# Patient Record
Sex: MALE | Race: WHITE | NOT HISPANIC OR LATINO | Employment: OTHER | URBAN - METROPOLITAN AREA
[De-identification: names, ages, dates, MRNs, and addresses within clinical notes are randomized per-mention and may not be internally consistent; named-entity substitution may affect disease eponyms.]

---

## 2019-06-20 ENCOUNTER — HOSPITAL ENCOUNTER (EMERGENCY)
Facility: HOSPITAL | Age: 64
Discharge: HOME | End: 2019-06-20
Attending: EMERGENCY MEDICINE
Payer: MEDICARE

## 2019-06-20 VITALS
SYSTOLIC BLOOD PRESSURE: 143 MMHG | BODY MASS INDEX: 33.86 KG/M2 | DIASTOLIC BLOOD PRESSURE: 92 MMHG | TEMPERATURE: 98 F | HEART RATE: 99 BPM | HEIGHT: 72 IN | OXYGEN SATURATION: 94 % | WEIGHT: 250 LBS | RESPIRATION RATE: 16 BRPM

## 2019-06-20 DIAGNOSIS — R07.9 CHEST PAIN: ICD-10-CM

## 2019-06-20 DIAGNOSIS — S29.011A MUSCLE STRAIN OF CHEST WALL, INITIAL ENCOUNTER: ICD-10-CM

## 2019-06-20 DIAGNOSIS — R07.89 CHEST WALL PAIN: Primary | ICD-10-CM

## 2019-06-20 PROCEDURE — 99284 EMERGENCY DEPT VISIT MOD MDM: CPT | Mod: ,,, | Performed by: EMERGENCY MEDICINE

## 2019-06-20 PROCEDURE — 99284 EMERGENCY DEPT VISIT MOD MDM: CPT | Mod: 25

## 2019-06-20 PROCEDURE — 93010 EKG 12-LEAD: ICD-10-PCS | Mod: ,,, | Performed by: INTERNAL MEDICINE

## 2019-06-20 PROCEDURE — 99284 PR EMERGENCY DEPT VISIT,LEVEL IV: ICD-10-PCS | Mod: ,,, | Performed by: EMERGENCY MEDICINE

## 2019-06-20 PROCEDURE — 93005 ELECTROCARDIOGRAM TRACING: CPT

## 2019-06-20 PROCEDURE — 93010 ELECTROCARDIOGRAM REPORT: CPT | Mod: ,,, | Performed by: INTERNAL MEDICINE

## 2019-06-20 NOTE — ED PROVIDER NOTES
"Encounter Date: 6/20/2019    SCRIBE #1 NOTE: I, Nichole Steen, am scribing for, and in the presence of,  Dr. Saravia. I have scribed the entire note.       History     Chief Complaint   Patient presents with    Chest Pain     cracked rib on right, now with sharp pain and shortness of breath      Time patient was seen by the provider: 2:08 PM      The patient is a 63 y.o. male with co-morbidities including: diabetes, HTN, hyperlipidemia and lobectomy who presents to the ED with a complaint of chest pain. Patient reports sudden onset of sharp right sided chest pain assoicated with SOB after he coughed this morning. He describes the pain as sharp, stabbing. He states he feels, "crackling" when he breathes. He reports history of similar presentation 1 month at which time he was found to have a dislocated rib. He states at that time, the pain subsided and he waited a week before seeking medical attention; however, with this occurrence, the pain has been unrelenting. He describes as stabbing. Patient notes he has chronic chest pain, but this pain today is much worse. He notes he normally has SOB when walking distances.    The history is provided by the patient and medical records.     Review of patient's allergies indicates:   Allergen Reactions    Beta-blockers (beta-adrenergic blocking agts) Shortness Of Breath     wheezing    Nsaids (non-steroidal anti-inflammatory drug) Other (See Comments)     hypertention    Statins-hmg-coa reductase inhibitors Other (See Comments)     Heart arrythemias    Pcn [penicillins] Rash     Past Medical History:   Diagnosis Date    Arthritis     Diabetes mellitus     Hyperlipidemia     Hypertension      Past Surgical History:   Procedure Laterality Date    mrxoma removed      PLEURA BIOPSY       Family History   Problem Relation Age of Onset    Hodgkin's lymphoma Mother     Kidney failure Father     Diabetes type I Father     Cancer Sister      Social History     Tobacco Use "    Smoking status: Heavy Tobacco Smoker     Packs/day: 1.00     Years: 35.00     Pack years: 35.00    Smokeless tobacco: Never Used   Substance Use Topics    Alcohol use: No    Drug use: No     Review of Systems   Constitutional: Negative for fever.   HENT: Negative for sore throat.    Respiratory: Positive for shortness of breath.    Cardiovascular: Positive for chest pain (right sided).   Gastrointestinal: Negative for nausea.   Genitourinary: Negative for dysuria.   Musculoskeletal: Negative for back pain.   Skin: Negative for rash.   Neurological: Negative for weakness.   Hematological: Does not bruise/bleed easily.       Physical Exam     Initial Vitals [06/20/19 1156]   BP Pulse Resp Temp SpO2   (!) 172/92 102 18 98.2 °F (36.8 °C) 95 %      MAP       --         Physical Exam    Nursing note and vitals reviewed.  Constitutional: He appears well-developed and well-nourished. No distress.   Cardiovascular: Normal rate, regular rhythm and normal heart sounds. Exam reveals no gallop and no friction rub.    No murmur heard.  Pulmonary/Chest: Breath sounds normal. No respiratory distress. He has no rhonchi. He has no rales. He exhibits tenderness (over the right anterior chest wall at the level of the lower ribs; palpation of this area reproduces the pain).   Expiratory wheezes at left base that cleared after deep breathing.   Abdominal: Soft. He exhibits no distension. There is no tenderness.         ED Course   Procedures  Labs Reviewed - No data to display     ECG Results          EKG 12-lead (Final result)  Result time 06/20/19 13:38:05    Final result by Interface, Lab In [facility] (06/20/19 13:38:05)                 Narrative:    Test Reason : R07.9,    Vent. Rate : 101 BPM     Atrial Rate : 101 BPM     P-R Int : 130 ms          QRS Dur : 106 ms      QT Int : 354 ms       P-R-T Axes : 043 -68 078 degrees     QTc Int : 459 ms    Sinus tachycardia  Left anterior fascicular block  Abnormal ECG  When compared  with ECG of 22-DEC-2009 12:09,  Nonspecific T wave abnormality no longer evident in Lateral leads  Confirmed by SHAONN HAMMER MD ([id number]) on 6/20/2019 1:37:59 PM    Referred By: [id number]   SELF           Confirmed By:SHANON HAMMER MD                            Imaging Results          X-Ray Chest PA And Lateral (Final result)  Result time 06/20/19 13:18:04    Final result by Gisel Bradley MD (06/20/19 13:18:04)                 Impression:      Postoperative changes noted in the right hemithorax.  Allowing for these and for bandlike opacities at the lateral aspect of the left lower lung zone, I detect no convincing evidence of disease on inspiratory and expiratory PA chest radiographs or on accompanying lateral view.      Electronically signed by: Gisel Bradley MD  Date:    06/20/2019  Time:    13:18             Narrative:    EXAMINATION:  XR CHEST PA AND LATERAL    CLINICAL HISTORY:  chest pain;    TECHNIQUE:  PA and lateral views of the chest were performed.    COMPARISON:  Right ribs: 05/30/2019.    Chest radiograph: 04/21/2010.  01/21/2010.  12/22/2009.    FINDINGS:  There is slight rightward mediastinal shift and partial volume loss in the right hemithorax reflecting prior partial resection of the right lung.  By history this resection of the right lung was performed for lobectomy necessitated by adhesions that developed after resection of right atrial myxoma.    Modest bandlike opacities at the left lateral costophrenic angle, seen only on PA view, are consistent with subsegmental atelectasis.    PA chest radiographs were obtained with inspiration and expiration.  There is no mediastinal shift and no evidence of air trapping.    I detect no new pulmonary disease and no pleural fluid, lymph node enlargement, cardiac decompensation, pneumothorax, pneumomediastinum or significant osseous abnormality.                              X-Rays:   Independently Interpreted Readings:   Other Readings:  Linear  opacification at left lung base. Chronic appearing changes at right lung base. No evidence of pneumothorax.     Medical Decision Making:   History:   Old Medical Records: I decided to obtain old medical records.  Old Records Summarized: records from clinic visits and other records.  Initial Assessment:   63 y.o. male with right sided chest wall pain after coughing. Differential diagnoses includes chest wall strain, pneumothorax, considered, but doubt pneumonia, PE, rib fracture. I have independently interpreted chest xray which shows no evidence of pneumothorax or rib fracture. I do not feel his findings on xray are acute. I have recommended lidocaine patches to the area of pain. Patient has these already at home. I recommend close follow up with his PCP for recheck sooner for worsening. Of note, patient does report that he has baseline SOB and his pulse ox does drop with exertion at his baseline. His breathing is no different today.   Independently Interpreted Test(s):   I have ordered and independently interpreted X-rays - see prior notes.  Clinical Tests:   Radiological Study: Ordered and Reviewed  Medical Tests: Ordered and Reviewed            Scribe Attestation:   Scribe #1: I performed the above scribed service and the documentation accurately describes the services I performed. I attest to the accuracy of the note.    Attending Attestation:           Physician Attestation for Scribe:      Comments: I, Dr. Tabitha Cody, personally performed the services described in this documentation. All medical record entries made by the scribe were at my direction and in my presence.  I have reviewed the chart and agree that the record reflects my personal performance and is accurate and complete. Tabitha Cody MD.                 Clinical Impression:       ICD-10-CM ICD-9-CM   1. Chest wall pain R07.89 786.52   2. Chest pain R07.9 786.50   3. Muscle strain of chest wall, initial encounter S29.011A 848.8          Disposition:   Disposition: Discharged  Condition: Stable                        Tabitha Cody MD  06/23/19 1129

## 2019-06-20 NOTE — PROVIDER PROGRESS NOTES - EMERGENCY DEPT.
Encounter Date: 6/20/2019    ED Physician Progress Notes         EKG - STEMI Decision  Initial Reading: No STEMI present.

## 2019-06-20 NOTE — ED NOTES
LOC: The patient is awake, alert and aware of environment with an appropriate affect, the patient is oriented x 3 and speaking appropriately.  APPEARANCE: Patient resting comfortably and in no acute distress, patient is clean and well groomed, patient's clothing is properly fastened.  SKIN: The skin is warm and dry, color consistent with ethnicity, patient has normal skin turgor and moist mucus membranes, skin intact, no breakdown or bruising noted.  MUSCULOSKELETAL: Patient moving all extremities spontaneously, no obvious swelling or deformities noted.  RESPIRATORY: Airway is open and patent, respirations are spontaneous, patient has a normal effort and rate, no accessory muscle use noted  ABDOMEN: Soft and non tender to palpation, no distention noted  NEUROLOGIC:  facial expression is symmetrical, patient moving all extremities spontaneously, normal sensation in all extremities when touched with a finger.  Follows all commands appropriately.    Patient states he coughed this am and felt a sudden sharp pain under his right chest in his rib area, patient states now he feels a cracking feeling when he takes a deep breath in that same area, no acute distress noted, denies any other complains at this time, will continue to monitor

## 2019-06-20 NOTE — DISCHARGE INSTRUCTIONS
Our goal in the emergency department is to always give you outstanding care and exceptional service. You may receive a survey by mail or e-mail in the next week regarding your experience in our ED. We would greatly appreciate your completing and returning the survey. Your feedback provides us with a way to recognize our staff who give very good care and it helps us learn how to improve when your experience was below our aspiration of excellence.     Use your lidocaine patches in the area of pain as needed.  Take over the counter advil as needed with food.    Return to the ED for worsening in any way or see your doctor sooner for worsening shortness of breath or worsening chest pain.

## 2019-06-20 NOTE — ED NOTES
Pt expressed concerns with becoming hypoglycemic, MD notified. Pt provided crackers and juice, verbal order per Dr. Saravia

## 2020-01-31 ENCOUNTER — TELEPHONE (OUTPATIENT)
Dept: RHEUMATOLOGY | Facility: CLINIC | Age: 65
End: 2020-01-31

## 2020-01-31 NOTE — TELEPHONE ENCOUNTER
Spoke to pt and scheduled appt. Pt was closer to [facility] and wanted to schedule there.

## 2020-01-31 NOTE — TELEPHONE ENCOUNTER
----- Message from Timoteo Tavarez sent at 1/31/2020 10:55 AM CST -----  Contact: pt  Type: Needs Medical Advice    Who Called:  pt    Best Call Back Number: [phone]  Additional Information: States a referral was faxed over for him by his PCP. Would like to make an appointment. Please call to advise.

## 2020-04-07 ENCOUNTER — PATIENT MESSAGE (OUTPATIENT)
Dept: RHEUMATOLOGY | Facility: CLINIC | Age: 65
End: 2020-04-07

## 2020-04-07 ENCOUNTER — OFFICE VISIT (OUTPATIENT)
Dept: RHEUMATOLOGY | Facility: CLINIC | Age: 65
End: 2020-04-07
Payer: MEDICARE

## 2020-04-07 ENCOUNTER — LAB VISIT (OUTPATIENT)
Dept: LAB | Facility: OTHER | Age: 65
End: 2020-04-07
Attending: INTERNAL MEDICINE
Payer: MEDICARE

## 2020-04-07 DIAGNOSIS — M25.50 POLYARTHRALGIA: ICD-10-CM

## 2020-04-07 DIAGNOSIS — Z79.52 LONG TERM (CURRENT) USE OF SYSTEMIC STEROIDS: ICD-10-CM

## 2020-04-07 DIAGNOSIS — M25.50 POLYARTHRALGIA: Primary | ICD-10-CM

## 2020-04-07 DIAGNOSIS — Z11.4 ENCOUNTER FOR SCREENING FOR HUMAN IMMUNODEFICIENCY VIRUS (HIV): ICD-10-CM

## 2020-04-07 DIAGNOSIS — R79.9 ABNORMAL FINDING OF BLOOD CHEMISTRY, UNSPECIFIED: ICD-10-CM

## 2020-04-07 DIAGNOSIS — Z79.631 ENCOUNTER FOR METHOTREXATE MONITORING: ICD-10-CM

## 2020-04-07 DIAGNOSIS — M06.9 RHEUMATOID ARTHRITIS FLARE: Primary | ICD-10-CM

## 2020-04-07 DIAGNOSIS — Z51.81 ENCOUNTER FOR METHOTREXATE MONITORING: ICD-10-CM

## 2020-04-07 LAB
ALBUMIN SERPL BCP-MCNC: 3.4 G/DL (ref 3.5–5.2)
ALP SERPL-CCNC: 93 U/L (ref 55–135)
ALT SERPL W/O P-5'-P-CCNC: 48 U/L (ref 10–44)
ANION GAP SERPL CALC-SCNC: 12 MMOL/L (ref 8–16)
AST SERPL-CCNC: 23 U/L (ref 10–40)
BASOPHILS # BLD AUTO: 0.07 K/UL (ref 0–0.2)
BASOPHILS NFR BLD: 0.7 % (ref 0–1.9)
BILIRUB SERPL-MCNC: 0.3 MG/DL (ref 0.1–1)
BUN SERPL-MCNC: 19 MG/DL (ref 8–23)
CALCIUM SERPL-MCNC: 8.9 MG/DL (ref 8.7–10.5)
CCP AB SER IA-ACNC: 336.9 U/ML
CHLORIDE SERPL-SCNC: 104 MMOL/L (ref 95–110)
CO2 SERPL-SCNC: 25 MMOL/L (ref 23–29)
CREAT SERPL-MCNC: 1 MG/DL (ref 0.5–1.4)
CRP SERPL-MCNC: 1.5 MG/L (ref 0–8.2)
DIFFERENTIAL METHOD: ABNORMAL
EOSINOPHIL # BLD AUTO: 0.2 K/UL (ref 0–0.5)
EOSINOPHIL NFR BLD: 2.2 % (ref 0–8)
ERYTHROCYTE [DISTWIDTH] IN BLOOD BY AUTOMATED COUNT: 13.6 % (ref 11.5–14.5)
ERYTHROCYTE [SEDIMENTATION RATE] IN BLOOD: 5 MM/HR (ref 0–10)
EST. GFR  (AFRICAN AMERICAN): >60 ML/MIN/1.73 M^2
EST. GFR  (NON AFRICAN AMERICAN): >60 ML/MIN/1.73 M^2
ESTIMATED AVG GLUCOSE: 209 MG/DL (ref 68–131)
FERRITIN SERPL-MCNC: 163 NG/ML (ref 20–300)
GLUCOSE SERPL-MCNC: 165 MG/DL (ref 70–110)
HBA1C MFR BLD HPLC: 8.9 % (ref 4–5.6)
HCT VFR BLD AUTO: 53.4 % (ref 40–54)
HGB BLD-MCNC: 17.4 G/DL (ref 14–18)
IMM GRANULOCYTES # BLD AUTO: 0.04 K/UL (ref 0–0.04)
IMM GRANULOCYTES NFR BLD AUTO: 0.4 % (ref 0–0.5)
IRON SERPL-MCNC: 110 UG/DL (ref 45–160)
LYMPHOCYTES # BLD AUTO: 2.3 K/UL (ref 1–4.8)
LYMPHOCYTES NFR BLD: 24.5 % (ref 18–48)
MCH RBC QN AUTO: 30.3 PG (ref 27–31)
MCHC RBC AUTO-ENTMCNC: 32.6 G/DL (ref 32–36)
MCV RBC AUTO: 93 FL (ref 82–98)
MONOCYTES # BLD AUTO: 0.7 K/UL (ref 0.3–1)
MONOCYTES NFR BLD: 6.8 % (ref 4–15)
NEUTROPHILS # BLD AUTO: 6.2 K/UL (ref 1.8–7.7)
NEUTROPHILS NFR BLD: 65.4 % (ref 38–73)
NRBC BLD-RTO: 0 /100 WBC
PLATELET # BLD AUTO: 141 K/UL (ref 150–350)
PMV BLD AUTO: 12.2 FL (ref 9.2–12.9)
POTASSIUM SERPL-SCNC: 4.3 MMOL/L (ref 3.5–5.1)
PROT SERPL-MCNC: 6.9 G/DL (ref 6–8.4)
RBC # BLD AUTO: 5.75 M/UL (ref 4.6–6.2)
RHEUMATOID FACT SERPL-ACNC: 456 IU/ML (ref 0–15)
SATURATED IRON: 27 % (ref 20–50)
SODIUM SERPL-SCNC: 141 MMOL/L (ref 136–145)
TOTAL IRON BINDING CAPACITY: 401 UG/DL (ref 250–450)
TRANSFERRIN SERPL-MCNC: 271 MG/DL (ref 200–375)
URATE SERPL-MCNC: 5 MG/DL (ref 3.4–7)
WBC # BLD AUTO: 9.52 K/UL (ref 3.9–12.7)

## 2020-04-07 PROCEDURE — 99205 OFFICE O/P NEW HI 60 MIN: CPT | Mod: 95,,, | Performed by: INTERNAL MEDICINE

## 2020-04-07 PROCEDURE — 86706 HEP B SURFACE ANTIBODY: CPT

## 2020-04-07 PROCEDURE — 86200 CCP ANTIBODY: CPT

## 2020-04-07 PROCEDURE — 84165 PATHOLOGIST INTERPRETATION SPE: ICD-10-PCS | Mod: 26,,, | Performed by: PATHOLOGY

## 2020-04-07 PROCEDURE — 99205 PR OFFICE/OUTPT VISIT, NEW, LEVL V, 60-74 MIN: ICD-10-PCS | Mod: 95,,, | Performed by: INTERNAL MEDICINE

## 2020-04-07 PROCEDURE — 84165 PROTEIN E-PHORESIS SERUM: CPT

## 2020-04-07 PROCEDURE — 86592 SYPHILIS TEST NON-TREP QUAL: CPT

## 2020-04-07 PROCEDURE — 85651 RBC SED RATE NONAUTOMATED: CPT

## 2020-04-07 PROCEDURE — 86431 RHEUMATOID FACTOR QUANT: CPT

## 2020-04-07 PROCEDURE — 82728 ASSAY OF FERRITIN: CPT

## 2020-04-07 PROCEDURE — 83540 ASSAY OF IRON: CPT

## 2020-04-07 PROCEDURE — 87340 HEPATITIS B SURFACE AG IA: CPT

## 2020-04-07 PROCEDURE — 84550 ASSAY OF BLOOD/URIC ACID: CPT

## 2020-04-07 PROCEDURE — 86704 HEP B CORE ANTIBODY TOTAL: CPT

## 2020-04-07 PROCEDURE — 86038 ANTINUCLEAR ANTIBODIES: CPT

## 2020-04-07 PROCEDURE — 84165 PROTEIN E-PHORESIS SERUM: CPT | Mod: 26,,, | Performed by: PATHOLOGY

## 2020-04-07 PROCEDURE — 80053 COMPREHEN METABOLIC PANEL: CPT

## 2020-04-07 PROCEDURE — 86803 HEPATITIS C AB TEST: CPT

## 2020-04-07 PROCEDURE — 86140 C-REACTIVE PROTEIN: CPT

## 2020-04-07 PROCEDURE — 86787 VARICELLA-ZOSTER ANTIBODY: CPT

## 2020-04-07 PROCEDURE — 85025 COMPLETE CBC W/AUTO DIFF WBC: CPT

## 2020-04-07 PROCEDURE — 83036 HEMOGLOBIN GLYCOSYLATED A1C: CPT

## 2020-04-07 RX ORDER — PREDNISONE 10 MG/1
10 TABLET ORAL 2 TIMES DAILY
COMMUNITY
End: 2020-10-02

## 2020-04-07 RX ORDER — METHOTREXATE 2.5 MG/1
12.5 TABLET ORAL
COMMUNITY
End: 2020-10-02

## 2020-04-07 ASSESSMENT — ROUTINE ASSESSMENT OF PATIENT INDEX DATA (RAPID3)
PATIENT GLOBAL ASSESSMENT SCORE: 5
PAIN SCORE: 8
FATIGUE SCORE: 9
PSYCHOLOGICAL DISTRESS SCORE: 4.4
TOTAL RAPID3 SCORE: 6.33
MDHAQ FUNCTION SCORE: 1.8
AM STIFFNESS SCORE: 0, NO

## 2020-04-07 NOTE — PROGRESS NOTES
Subjective:       Patient ID: Nithin Wagner is a 64 y.o. male.    Chief Complaint: No chief complaint on file.    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   He was 53 when he was diagnosed with RA. He reports he reports he was having pain and swelling in knees and ankles. He then he had involvement in hands and shoulders. He was put on MTX.  He reports that the methotrexate did help with joints but it caused confusion so he stopped it for 2 years. He was taken aleve which helped his pain for 2 years.Then he developed HTN on NSAIDS so he stopped them.  He was then put on hydrocodone 5 QID to control his pain. Reports that warm weather improves his pain. He is back on methotrexate. Today, it will be his 5th dose. He is taking folic acid 1 mg po qday.  He has never tried up to 8 pills once a week. He is taking prednisone 10mg po BID for 4 weeks. He still has some pain in ankles and knees.  He does get mild swelling in knees and ankles. He was previously on prednisone 40mg a day. He smokes 1 pack per day for 40 years    family hx:cousin: connective tissue disease      Past Medical History:   Diagnosis Date    Arthritis     Diabetes mellitus     Hyperlipidemia     Hypertension        Review of Systems      Objective:   There were no vitals taken for this visit.     Physical Exam   Constitutional: He is oriented to person, place, and time and well-developed, well-nourished, and in no distress. No distress.   HENT:   Head: Normocephalic and atraumatic.   Right Ear: External ear normal.   Eyes: Conjunctivae and EOM are normal. Pupils are equal, round, and reactive to light. Right eye exhibits no discharge. Left eye exhibits no discharge. No scleral icterus.   Neck: Normal range of motion. Neck supple. No JVD present. No tracheal deviation present. No thyromegaly  present.   Cardiovascular: Normal rate and regular rhythm.    Pulmonary/Chest: Effort normal and breath sounds normal. No stridor. No respiratory distress. He has no wheezes. He has no rales. He exhibits no tenderness.   Abdominal: Soft. Bowel sounds are normal. He exhibits no distension and no mass. There is no tenderness. There is no rebound and no guarding.   Lymphadenopathy:     He has no cervical adenopathy.   Neurological: He is alert and oriented to person, place, and time. He displays normal reflexes. No cranial nerve deficit. He exhibits normal muscle tone. Coordination normal.   Skin: Skin is warm and dry. No rash noted. He is not diaphoretic. No erythema. No pallor.     Musculoskeletal: He exhibits edema and tenderness. He exhibits no deformity.          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 He is on MTX and prednisone 20mg a day and is still having severe flares with polyarticular involvement.  I had long discussion with patient and told him he has severe disease and we will likely need to add biologic.    No diagnosis found.    Plan:       Problem List Items Addressed This Visit     None        Labs  xrays  Continue prednisone 10mg po BID (discussed side effects of long term steroids including weight gain, diabetes, cataracts, immunosuppression)  Discussed side effects of MTX ( Patient aware that risks include and not limited to lung toxicity, liver abnormalities, cell count abnormalities, malignancy, and allergic  reaction to medication. )*    Labs  xrays    Educated patient on Covid virus and prevention strategies.    The patient location is: home  The chief complaint leading to consultation is: joint pain  Visit type: Virtual visit with synchronous audio and video  Total time  spent with patient: 30 minutes  Each patient to whom he or she provides medical services by telemedicine is:  (1) informed of the relationship between the physician and patient and the respective role of any other health care provider with respect to management of the patient; and (2) notified that he or she may decline to receive medical services by telemedicine and may withdraw from such care at any time.

## 2020-04-07 NOTE — PROGRESS NOTES
Rapid3 Question Responses and Scores 4/7/2020   MDHAQ Score 1.8   Psychologic Score 4.4   Pain Score 8   When you awakened in the morning OVER THE LAST WEEK, did you feel stiff? No   If Yes, please indicate the number of hours until you are as limber as you will be for the day 0   Fatigue Score 9   Global Health Score 5   RAPID3 Score 6.33       Answers for HPI/ROS submitted by the patient on 4/7/2020   fever: Yes  eye redness: No  headaches: Yes  shortness of breath: No  chest pain: No  trouble swallowing: No  diarrhea: No  constipation: No  unexpected weight change: No  genital sore: No  During the last 3 days, have you had a skin rash?: No  Bruises or bleeds easily: No  cough: Yes

## 2020-04-07 NOTE — PROGRESS NOTES
Pre charting done. Lara Montelongo MA  Answers for HPI/ROS submitted by the patient on 4/7/2020   fever: Yes  eye redness: No  headaches: Yes  shortness of breath: No  chest pain: No  trouble swallowing: No  diarrhea: No  constipation: No  unexpected weight change: No  genital sore: No  During the last 3 days, have you had a skin rash?: No  Bruises or bleeds easily: No  cough: Yes

## 2020-04-07 NOTE — LETTER
April 13, 2020      Tushar Drew MD  [address] [facility] [place] [address]           [facility] - Rheumatology  [address]  [place] [address]  Phone: [phone]  Fax: [phone]          Patient: Nithin Wagner   MR Number: [id number]   YOB: 1955   Date of Visit: 4/7/2020       Dear Dr. Tushar Drew:    Thank you for referring Nithin Wagner to me for evaluation. Attached you will find relevant portions of my assessment and plan of care.    If you have questions, please do not hesitate to call me. I look forward to following Nithin Wagner along with you.    Sincerely,    Korin Alcala MD    Enclosure  CC:  No Recipients    If you would like to receive this communication electronically, please contact [email or web address] or [phone] to request more information on [facility] access.    For providers and/or their staff who would like to refer a patient to [facility], please contact us through our one-stop-shop provider referral line, [facility], at [phone].    If you feel you have received this communication in error or would no longer like to receive these types of communications, please e-mail [email or web address]

## 2020-04-08 ENCOUNTER — PATIENT MESSAGE (OUTPATIENT)
Dept: RHEUMATOLOGY | Facility: CLINIC | Age: 65
End: 2020-04-08

## 2020-04-08 LAB
ALBUMIN SERPL ELPH-MCNC: 3.75 G/DL (ref 3.35–5.55)
ALPHA1 GLOB SERPL ELPH-MCNC: 0.29 G/DL (ref 0.17–0.41)
ALPHA2 GLOB SERPL ELPH-MCNC: 0.79 G/DL (ref 0.43–0.99)
B-GLOBULIN SERPL ELPH-MCNC: 0.75 G/DL (ref 0.5–1.1)
GAMMA GLOB SERPL ELPH-MCNC: 0.91 G/DL (ref 0.67–1.58)
HBV CORE AB SERPL QL IA: POSITIVE
HBV SURFACE AB SER-ACNC: POSITIVE M[IU]/ML
HBV SURFACE AG SERPL QL IA: NEGATIVE
HCV AB SERPL QL IA: NEGATIVE
PATHOLOGIST INTERPRETATION SPE: NORMAL
PROT SERPL-MCNC: 6.5 G/DL (ref 6–8.4)
STRONGYLOIDES ANTIBODY IGG: NEGATIVE

## 2020-04-09 ENCOUNTER — PATIENT MESSAGE (OUTPATIENT)
Dept: RHEUMATOLOGY | Facility: CLINIC | Age: 65
End: 2020-04-09

## 2020-04-09 LAB
ANA SER QL IF: NORMAL
RPR SER QL: NORMAL

## 2020-04-11 LAB
VARICELLA INTERPRETATION: POSITIVE
VARICELLA ZOSTER IGG: 3.41 ISR (ref 0–0.9)

## 2020-04-13 ENCOUNTER — OFFICE VISIT (OUTPATIENT)
Dept: RHEUMATOLOGY | Facility: CLINIC | Age: 65
End: 2020-04-13
Payer: MEDICARE

## 2020-04-13 DIAGNOSIS — M06.9 RHEUMATOID ARTHRITIS FLARE: ICD-10-CM

## 2020-04-13 DIAGNOSIS — Z79.631 ENCOUNTER FOR METHOTREXATE MONITORING: ICD-10-CM

## 2020-04-13 DIAGNOSIS — R76.8 HEPATITIS B CORE ANTIBODY POSITIVE: Primary | ICD-10-CM

## 2020-04-13 DIAGNOSIS — Z51.81 ENCOUNTER FOR METHOTREXATE MONITORING: ICD-10-CM

## 2020-04-13 PROCEDURE — 99214 PR OFFICE/OUTPT VISIT, EST, LEVL IV, 30-39 MIN: ICD-10-PCS | Mod: 95,,, | Performed by: INTERNAL MEDICINE

## 2020-04-13 PROCEDURE — 99214 OFFICE O/P EST MOD 30 MIN: CPT | Mod: 95,,, | Performed by: INTERNAL MEDICINE

## 2020-04-13 NOTE — PROGRESS NOTES
Pre-charting done. Lara Montelongo MA  Answers for HPI/ROS submitted by the patient on 4/7/2020   fever: No  eye redness: No  headaches: Yes  shortness of breath: No  chest pain: No  trouble swallowing: No  diarrhea: No  constipation: No  unexpected weight change: No  genital sore: No  During the last 3 days, have you had a skin rash?: No  Bruises or bleeds easily: No  cough: Yes

## 2020-04-13 NOTE — PROGRESS NOTES
Subjective:       Patient ID: Nithin Wagner is a 64 y.o. male.    Chief Complaint: No chief complaint on file.    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   He was 53 when he was diagnosed with RA. He reports he reports he was having pain and swelling in knees and ankles. He then he had involvement in hands and shoulders. He was put on MTX.  He reports that the methotrexate did help with joints but it caused confusion so he stopped it for 2 years. He was taken aleve which helped his pain for 2 years.Then he developed HTN on NSAIDS so he stopped them.  He was then put on hydrocodone 5 QID to control his pain. Reports that warm weather improves his pain. He is back on methotrexate. Today, it will be his 5th dose. He is taking folic acid 1 mg po qday.  He has never tried up to 8 pills once a week. He is taking prednisone 10mg po BID for 4 weeks. He still has some pain in ankles and knees.  He does get mild swelling in knees and ankles. He was previously on prednisone 40mg a day. He smokes 1 pack per day for 40 years    family hx:cousin: connective tissue disease    Interval history: Last dose of MTX was last Tuesday.      Past Medical History:   Diagnosis Date    Arthritis     Diabetes mellitus     Hyperlipidemia     Hypertension        Review of Systems      Review of Systems   Constitutional: Negative for fever.   Eyes: Negative for redness.   Respiratory: Negative for cough, hemoptysis, sputum production and shortness of breath.    Cardiovascular: Negative for chest pain.   Gastrointestinal: Negative for constipation and diarrhea.   Musculoskeletal: Positive for joint pain.   Skin: Negative for rash.   Neurological: Negative for headaches.   Endo/Heme/Allergies: Does not bruise/bleed easily.         Objective:   There were no vitals taken for this visit.      Physical Exam   Constitutional: He is oriented to person, place, and time and well-developed, well-nourished, and in no distress. No distress.   HENT:   Head: Normocephalic and atraumatic.   Right Ear: External ear normal.   Eyes: Conjunctivae and EOM are normal. Pupils are equal, round, and reactive to light. Right eye exhibits no discharge. Left eye exhibits no discharge. No scleral icterus.   Neck: Normal range of motion. Neck supple. No JVD present. No tracheal deviation present. No thyromegaly present.   Cardiovascular: Normal rate and regular rhythm.    Pulmonary/Chest: Effort normal and breath sounds normal. No stridor. No respiratory distress. He has no wheezes. He has no rales. He exhibits no tenderness.   Abdominal: Soft. Bowel sounds are normal. He exhibits no distension and no mass. There is no tenderness. There is no rebound and no guarding.   Lymphadenopathy:     He has no cervical adenopathy.   Neurological: He is alert and oriented to person, place, and time. He displays normal reflexes. No cranial nerve deficit. He exhibits normal muscle tone. Coordination normal.   Skin: Skin is warm and dry. No rash noted. He is not diaphoretic. No erythema. No pallor.     Musculoskeletal: He exhibits edema and tenderness. He exhibits no deformity.      labs: reviewed  Results for CECILIA MEAD (MRN [id number]) as of 4/14/2020 12:44   Ref. 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 He is on MTX and prednisone 20mg a day and is still having severe flares with polyarticular involvement.  I had long discussion with patient and told him he has severe disease and  we will likely need to add biologic.    Given that he has +hep B core antibody and reports history of hepatitis, will set him up with hepatology before we add biologic. I had long discussion with patient and told him ideally, I would like to get him off methotrexate give his baseline thrombocytopenia, mild LFT elevation, and lung surgery, but given that I am limited, I will continue his mtx for now until we are cleared by hepatology.    No diagnosis found.    Plan:       Problem List Items Addressed This Visit     None      Continue MTX 6 pills once a week  Continue folic acid 1 mg po qday  Hepatology consult  Continue prednisone 10mg po BID (discussed side effects of long term steroids including weight gain, diabetes, cataracts, immunosuppression)  Educated patient on Covid virus and prevention strategies.  labs  The patient location is: home  The chief complaint leading to consultation is: joint pain  Visit type: Virtual visit with synchronous audio and video  Total time spent with patient: 30 minutes  Each patient to whom he or she provides medical services by telemedicine is:  (1) informed of the relationship between the physician and patient and the respective role of any other health care provider with respect to management of the patient; and (2) notified that he or she may decline to receive medical services by telemedicine and may withdraw from such care at any time.

## 2020-04-16 ENCOUNTER — DOCUMENTATION ONLY (OUTPATIENT)
Dept: TRANSPLANT | Facility: CLINIC | Age: 65
End: 2020-04-16

## 2020-04-16 NOTE — LETTER
April 16, 2020    Nithin PR[address]  [facility] [address]      Dear Nithin Wagner:    Your doctor has referred you to the [facility]. We are sending this letter to remind you to make an appointment with us to complete the referral process.     Please call us at [phone] to schedule an appointment. We look forward to seeing you soon.     If you received a call and have been scheduled, please disregard this letter.       Sincerely,        [facility]   [address] [place] [address]  [phone]

## 2020-04-16 NOTE — NURSING
Pt records reviewed.   Pt will be referred to Hepatology.  Hepatitis B core antibody positive  Initial referral received  from the [facility].   Referring Provider/diagnosis  Korin Alcala MD      Referral letter sent to patient.

## 2020-04-24 ENCOUNTER — OFFICE VISIT (OUTPATIENT)
Dept: HEPATOLOGY | Facility: CLINIC | Age: 65
End: 2020-04-24
Payer: MEDICARE

## 2020-04-24 ENCOUNTER — PATIENT MESSAGE (OUTPATIENT)
Dept: HEPATOLOGY | Facility: CLINIC | Age: 65
End: 2020-04-24

## 2020-04-24 ENCOUNTER — TELEPHONE (OUTPATIENT)
Dept: HEPATOLOGY | Facility: CLINIC | Age: 65
End: 2020-04-24

## 2020-04-24 DIAGNOSIS — K76.0 NAFLD (NONALCOHOLIC FATTY LIVER DISEASE): Primary | ICD-10-CM

## 2020-04-24 DIAGNOSIS — R76.8 HEPATITIS B CORE ANTIBODY POSITIVE: ICD-10-CM

## 2020-04-24 PROCEDURE — 99213 PR OFFICE/OUTPT VISIT, EST, LEVL III, 20-29 MIN: ICD-10-PCS | Mod: 95,,, | Performed by: INTERNAL MEDICINE

## 2020-04-24 PROCEDURE — 99213 OFFICE O/P EST LOW 20 MIN: CPT | Mod: 95,,, | Performed by: INTERNAL MEDICINE

## 2020-04-24 RX ORDER — LAMIVUDINE 150 MG/1
150 TABLET, FILM COATED ORAL DAILY
Qty: 30 TABLET | Refills: 11 | Status: SHIPPED | OUTPATIENT
Start: 2020-04-24 | End: 2020-05-24

## 2020-04-24 NOTE — PROGRESS NOTES
Subjective:       Patient ID: Nithin Wagner is a 64 y.o. male.    Chief Complaint: No chief complaint on file.  The patient location is: Home  The chief complaint leading to consultation is: HBVcAb+ve  Visit type: audiovisual  Total time spent with patient: 20 min  Each patient to whom he or she provides medical services by telemedicine is:  (1) informed of the relationship between the physician and patient and the respective role of any other health care provider with respect to management of the patient; and (2) notified that he or she may decline to receive medical services by telemedicine and may withdraw from such care at any time.      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  He is currently on high dose prednisone but was on methotrexate for about 1.5 years until 2017.    He describes jaundice twice- first time in 1974 after a needle stick accident at work and the second time when he was in [place].    I note that he is currently HCV ab negative but is HBVcAb+ve.    He is overweight with a BMI of 34 and has Diabetes.  He describes significant intermittent fatigue that appears not to be associated with flares of his RA.    Weight increased from 212 to 250 lb in last 8 years.      PMH:  Type 2 DM  Hypertension  Gout  Hyperlipidemia  Left atrial myxoma- 2007  Right upper lobectomy- 2009    SH:  No alcohol  35 pack years of cigarette smoking    FH:  Niece has HCV    Review of Systems   Constitutional: Positive for fatigue. Negative for activity change, appetite change, chills, fever and unexpected weight change.   HENT: Negative for hearing loss.    Eyes: Negative for discharge and visual disturbance.   Respiratory: Negative for cough, chest tightness, shortness of breath and wheezing.    Cardiovascular: Negative for chest pain, palpitations and leg swelling.   Gastrointestinal: Negative for abdominal distention, abdominal pain, constipation,  "diarrhea and nausea.   Genitourinary: Negative for dysuria and frequency.   Musculoskeletal: Negative for arthralgias and back pain.   Skin: Negative for pallor and rash.   Neurological: Negative for dizziness, tremors, speech difficulty and headaches.   Hematological: Negative for adenopathy.   Psychiatric/Behavioral: Negative for agitation and confusion.           Lab Results   Component Value Date    ALT 48 (H) 04/07/2020    AST 23 04/07/2020    ALKPHOS 93 04/07/2020    BILITOT 0.3 04/07/2020     Past Medical History:   Diagnosis Date    Arthritis     Diabetes mellitus     Hyperlipidemia     Hypertension      Past Surgical History:   Procedure Laterality Date    mrxoma removed      PLEURA BIOPSY       Current Outpatient Medications   Medication Sig    ACCU-CHEK MULTICLIX LANCET lancets     BD ULTRA-FINE RAHUL PEN NEEDLES 32 x 5/32 " Ndle     BENICAR 20 mg tablet Take 20 mg by mouth once daily.    colchicine (COLCRYS) 0.6 mg tablet Take 1 tablet (0.6 mg total) by mouth once daily. Take one hour after the ER dose you were given today for 1 dose    CONTOUR NEXT STRIPS Strp     escitalopram oxalate (LEXAPRO) 20 MG tablet     folic acid (FOLVITE) 1 MG tablet     furosemide (LASIX) 40 MG tablet Take 40 mg by mouth as needed.    hydroCHLOROthiazide (HYDRODIURIL) 12.5 MG Tab Take 12.5 mg by mouth once daily.    hydrocodone-acetaminophen 10-325mg (NORCO)  mg Tab     lorazepam (ATIVAN) 1 MG tablet     methotrexate 2.5 MG Tab Take 12.5 mg by mouth every 7 days.    NOVOLOG FLEXPEN 100 unit/mL InPn pen 30 Units 3 (three) times daily with meals.     OXYGEN-AIR DELIVERY SYSTEMS MISC by [facility].(Non-Drug; Combo Route) route.    potassium chloride (MICRO-K) 10 MEQ CpSR     predniSONE (DELTASONE) 10 MG tablet Take 10 mg by mouth 2 (two) times daily.    PROAIR HFA 90 mcg/actuation inhaler     TRESIBA FLEXTOUCH U-200 200 unit/mL (3 mL) InPn 40 Units 2 (two) times daily.      No current facility-administered " medications for this visit.        Objective:      Physical Exam    NOT DONE- VIDEO VISIT    Assessment:       1. Hepatitis B core antibody positive        Plan:   From the liver standpoint we are dealing with 2 issues:    1) HBVcAb positivity- he will likely need HBV prophylaxis if he is to be started on enbrel.  I will order this today but I asked him not to start it until the decision to give him biologics is made.    2) Likely NAFLD- borderline LFTs in someone with DM and high BMI- I note his low platelet count which may suggest significant fibrosis.    - I will order some more labs, a full liver US and a fibroscan to stage his disease. This should not delay his RA treatment.    Clinic in 3 months

## 2020-04-24 NOTE — LETTER
April 24, 2020      Korin Alcala MD  [address]  [address]  [facility] [address]           [facility] - Hepatology  [address]  [place] [address]  Phone: [phone]  Fax: [phone]          Patient: Nithin Wagner   MR Number: [id number]   YOB: 1955   Date of Visit: 4/24/2020       Dear Dr. Korin Alcala:    Thank you for referring Nithin Wagner to me for evaluation. Attached you will find relevant portions of my assessment and plan of care.    If you have questions, please do not hesitate to call me. I look forward to following Nithin Wagner along with you.    Sincerely,    Nithin Garza MD    Enclosure  CC:  No Recipients    If you would like to receive this communication electronically, please contact [email or web address] or [phone] to request more information on [employer] Link access.    For providers and/or their staff who would like to refer a patient to [facility], please contact us through our one-stop-shop provider referral line, [facility], at [phone].    If you feel you have received this communication in error or would no longer like to receive these types of communications, please e-mail [email or web address]

## 2020-04-27 ENCOUNTER — TELEPHONE (OUTPATIENT)
Dept: HEPATOLOGY | Facility: CLINIC | Age: 65
End: 2020-04-27

## 2020-04-27 ENCOUNTER — PATIENT MESSAGE (OUTPATIENT)
Dept: HEPATOLOGY | Facility: CLINIC | Age: 65
End: 2020-04-27

## 2020-04-29 ENCOUNTER — TELEPHONE (OUTPATIENT)
Dept: HEPATOLOGY | Facility: CLINIC | Age: 65
End: 2020-04-29

## 2020-04-29 NOTE — TELEPHONE ENCOUNTER
----- Message from Lamar Gregg MA sent at 4/29/2020 10:46 AM CDT -----  Regarding: Fibroscan/  Hey, I thought we're not seeing patients in clinic next week? It shows that you just rescheduled two days ago for him to come in on the 5th. Did Precious say it was ok or Dr. GIRON say it needs to be urgent now? I don't see it in the notes.

## 2020-04-30 ENCOUNTER — PATIENT MESSAGE (OUTPATIENT)
Dept: HEPATOLOGY | Facility: CLINIC | Age: 65
End: 2020-04-30

## 2020-05-05 ENCOUNTER — PROCEDURE VISIT (OUTPATIENT)
Dept: HEPATOLOGY | Facility: CLINIC | Age: 65
End: 2020-05-05
Payer: MEDICARE

## 2020-05-05 DIAGNOSIS — K76.0 NAFLD (NONALCOHOLIC FATTY LIVER DISEASE): ICD-10-CM

## 2020-05-05 PROCEDURE — 91200 LIVER ELASTOGRAPHY: CPT | Mod: PBBFAC | Performed by: INTERNAL MEDICINE

## 2020-05-05 PROCEDURE — 91200 FIBROSCAN (VIBRATION CONTROLLED TRANSIENT ELASTOGRAPHY): ICD-10-PCS | Mod: 26,S$PBB,, | Performed by: INTERNAL MEDICINE

## 2020-05-11 ENCOUNTER — TELEPHONE (OUTPATIENT)
Dept: HEPATOLOGY | Facility: CLINIC | Age: 65
End: 2020-05-11

## 2020-05-11 NOTE — TELEPHONE ENCOUNTER
----- Message from Eunice Estrada sent at 5/11/2020  7:22 AM CDT -----  Contact: self  Appointment Request From: Nithin Wagner    With Provider: Nithin Garza MD [[facility] - Hepatology]    Preferred Date Range: Any    Preferred Times: Any time    Reason for visit: Office Visit    Comments:  FU for liver testing and if I  will need liver biopsy. Before i can continue with Rheumatologist treatment plan.  Thanks

## 2020-05-11 NOTE — PROCEDURES
FibroScan (Vibration Controlled Transient Elastography)  Date/Time: 5/5/2020 10:30 AM  Performed by: Nithin Garza MD  Authorized by: Nithin Garza MD     Diagnosis:  NAFLD    Probe:  XL    Universal Protocol: Patient's identity, procedure and site were verified, confirmatory pause was performed.  Discussed procedure including risks and potential complications.  Questions answered.  Patient verbalizes understanding and wishes to proceed with VCTE.     Procedure: After providing explanations of the procedure, patient was placed in the supine position with right arm in maximum abduction to allow optimal exposure of right lateral abdomen.  Patient was briefly assessed, Testing was performed in the mid-axillary location, 50Hz Shear Wave pulses were applied and the resulting Shear Wave and Propagation Speed detected with a 3.5 MHz ultrasonic signal, using the FibroScan probe, Skin to liver capsule distance and liver parenchyma were accessed during the entire examination with the FibroScan probe, Patient was instructed to breathe normally and to abstain from sudden movements during the procedure, allowing for random measurements of liver stiffness. At least 10 Shear Waves were produced, Individual measurements of each Shear Wave were calculated.  Patient tolerated the procedure well with no complications.  Meets discharge criteria as was dismissed.  Rates pain 0 out of 10.  Patient will follow up with ordering provider to review results.      Findings  Median liver stiffness score:  7  CAP Reading: dB/m:  365    IQR/med %:  9  Interpretation  Fibrosis interpretation is based on medial liver stiffness - Kilopascal (kPa).    Fibrosis Stage:  F 0-1  Steatosis interpretation is based on controlled attenuation parameter - (dB/m).    Steatosis Grade:  S3

## 2020-05-15 ENCOUNTER — TELEPHONE (OUTPATIENT)
Dept: HEPATOLOGY | Facility: CLINIC | Age: 65
End: 2020-05-15

## 2020-05-15 NOTE — TELEPHONE ENCOUNTER
Spoke to Mr Bernard- investigations showed NAFLD with no fibrosis  He has been exposed to Hep B and is HBVcAb +ve.    - some risk of HBV flare with biologics.  - has been prescribed lamivudine and asked to start 1 week prior to the initiation of a biologic.  - emphasized that he doesn't need to start it if he doesn't get a biologic.    Follow up in Hep clinic in 6 months

## 2020-06-11 ENCOUNTER — TELEPHONE (OUTPATIENT)
Dept: RHEUMATOLOGY | Facility: CLINIC | Age: 65
End: 2020-06-11

## 2020-06-11 ENCOUNTER — OFFICE VISIT (OUTPATIENT)
Dept: RHEUMATOLOGY | Facility: CLINIC | Age: 65
End: 2020-06-11
Payer: MEDICARE

## 2020-06-11 ENCOUNTER — TELEPHONE (OUTPATIENT)
Dept: PHARMACY | Facility: CLINIC | Age: 65
End: 2020-06-11

## 2020-06-11 DIAGNOSIS — M06.9 RHEUMATOID ARTHRITIS INVOLVING MULTIPLE JOINTS: Primary | ICD-10-CM

## 2020-06-11 PROCEDURE — 99215 OFFICE O/P EST HI 40 MIN: CPT | Mod: 95,,, | Performed by: INTERNAL MEDICINE

## 2020-06-11 PROCEDURE — 99215 PR OFFICE/OUTPT VISIT, EST, LEVL V, 40-54 MIN: ICD-10-PCS | Mod: 95,,, | Performed by: INTERNAL MEDICINE

## 2020-06-11 RX ORDER — LAMIVUDINE 150 MG/1
150 TABLET, FILM COATED ORAL DAILY
COMMUNITY
End: 2020-09-11

## 2020-06-11 NOTE — PROGRESS NOTES
Subjective:       Patient ID: Nithin Wagner is a 64 y.o. male.    Chief Complaint: No chief complaint on file.    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   He was 53 when he was diagnosed with RA. He reports he reports he was having pain and swelling in knees and ankles. He then he had involvement in hands and shoulders. He was put on MTX.  He reports that the methotrexate did help with joints but it caused confusion so he stopped it for 2 years. He was taken aleve which helped his pain for 2 years.Then he developed HTN on NSAIDS so he stopped them.  He was then put on hydrocodone 5 QID to control his pain. Reports that warm weather improves his pain. He is back on methotrexate. Today, it will be his 5th dose. He is taking folic acid 1 mg po qday.  He has never tried up to 8 pills once a week. He is taking prednisone 10mg po BID for 4 weeks. He still has some pain in ankles and knees.  He does get mild swelling in knees and ankles. He was previously on prednisone 40mg a day. He smokes 1 pack per day for 40 years    family hx:cousin: connective tissue disease    Interval history: he is taking lamivudine since yesterday. He was diagnosed with MARI. He is taking prednisone 10mg po BID.  He is taking MTX 5 pills a week. Denies history of ulcers or diverticulitis.Denies history of CHF.          Past Medical History:   Diagnosis Date    Arthritis     Diabetes mellitus     Hyperlipidemia     Hypertension        Review of Systems      Review of Systems   Constitutional: Negative for fever.   Eyes: Negative for redness.   Respiratory: Negative for cough, hemoptysis, sputum production and shortness of breath.    Cardiovascular: Negative for chest pain.   Gastrointestinal: Negative for constipation and diarrhea.   Musculoskeletal: Positive for joint pain.   Skin:  Negative for rash.   Neurological: Negative for headaches.   Endo/Heme/Allergies: Does not bruise/bleed easily.         Objective:   There were no vitals taken for this visit.     Physical Exam   Constitutional: He is oriented to person, place, and time and well-developed, well-nourished, and in no distress. No distress.   HENT:   Head: Normocephalic and atraumatic.   Right Ear: External ear normal.   Eyes: Conjunctivae and EOM are normal. Pupils are equal, round, and reactive to light. Right eye exhibits no discharge. Left eye exhibits no discharge. No scleral icterus.   Neck: Normal range of motion. Neck supple. No JVD present. No tracheal deviation present. No thyromegaly present.   Cardiovascular: Normal rate and regular rhythm.    Pulmonary/Chest: Effort normal and breath sounds normal. No stridor. No respiratory distress. He has no wheezes. He has no rales. He exhibits no tenderness.   Abdominal: Soft. Bowel sounds are normal. He exhibits no distension and no mass. There is no tenderness. There is no rebound and no guarding.   Lymphadenopathy:     He has no cervical adenopathy.   Neurological: He is alert and oriented to person, place, and time. He displays normal reflexes. No cranial nerve deficit. He exhibits normal muscle tone. Coordination normal.   Skin: Skin is warm and dry. No rash noted. He is not diaphoretic. No erythema. No pallor.     Musculoskeletal: He exhibits edema and tenderness. He exhibits no deformity.      labs: reviewed  Results for CECILIA MEAD (MRN [id number]) as of 4/14/2020 12:44   Ref. Range 4/7/2020 13:52   CCP Antibodies Latest Ref Range: <5.0 U/mL 336.9 (H)   Rheumatoid Factor Latest Ref Range: 0.0 - 15.0 IU/mL 456.0 (H)       No data to display     Assessment:     64 year old with PMH of  Type II DM, HTN, ?gout, right atrial myxoma s/p surgery in 2018, PE, right upper lobectomy secondary to right atrial myxoma, latent TB s/p INH x6 in 1982  here  For follow up of  rheumatoid  arthritis. He has severe disease and is flaring. He is requiring high doses of it to function.    Given that he has +hep B core antibody, he was started on lamivudine by hepatology. I had long discussion with patient and told him ideally, I would like to get him off methotrexate give his baseline thrombocytopenia, mild LFT elevation, and lung surgery, I am limited.      No diagnosis found.    Plan:       Problem List Items Addressed This Visit     None      Continue MTX 6 pills once a week  Continue folic acid 1 mg po qday  Continue lamivudine per hepatology  Start enbrel 50mg sub q once a week (Risks of TNF inhibitor discussed with patient and not limited to cell count abnormalities, malignancy, allergic  reaction to medication, and increased risk of infection. Patient agrees with starting medication.  Labs before next visit      Continue prednisone 10mg po BID (discussed side effects of long term steroids including weight gain, diabetes, cataracts, immunosuppression)  Educated patient on Covid virus and prevention strategies.  Labs    No rosales kinase given history of PE        The patient location is: home  The chief complaint leading to consultation is: joint pain  Visit type: Virtual visit with synchronous audio and video  Total time spent with patient: 30 minutes  Each patient to whom he or she provides medical services by telemedicine is:  (1) informed of the relationship between the physician and patient and the respective role of any other health care provider with respect to management of the patient; and (2) notified that he or she may decline to receive medical services by telemedicine and may withdraw from such care at any time.

## 2020-06-22 NOTE — TELEPHONE ENCOUNTER
DOCUMENTATION ONLY:  Prior authorization for Enbrel approved from 01/01/20 to 12/31/20    Case Id: [id number]    Co-pay: $291.74    Assistance is needed

## 2020-07-30 ENCOUNTER — TELEPHONE (OUTPATIENT)
Dept: RHEUMATOLOGY | Facility: CLINIC | Age: 65
End: 2020-07-30

## 2020-07-30 NOTE — TELEPHONE ENCOUNTER
----- Message from Arpita Swain sent at 7/30/2020  3:11 PM CDT -----  Pt would like to receive a call back regarding rescheduling his appt. Pt states he will be out of town next week and will not be able to go to his lab appt. Please contact the pt to advise.    Contact info [phone]

## 2020-07-31 ENCOUNTER — TELEPHONE (OUTPATIENT)
Dept: RHEUMATOLOGY | Facility: CLINIC | Age: 65
End: 2020-07-31

## 2020-07-31 ENCOUNTER — TELEPHONE (OUTPATIENT)
Dept: HEPATOLOGY | Facility: CLINIC | Age: 65
End: 2020-07-31

## 2020-07-31 DIAGNOSIS — M06.9 RHEUMATOID ARTHRITIS FLARE: Primary | ICD-10-CM

## 2020-07-31 NOTE — TELEPHONE ENCOUNTER
----- Message from Korin Alcala MD sent at 7/31/2020 12:21 PM CDT -----   Hi  Please tell him to do the labs in [facility] when he gets back.  Ky[facility] wont do one of the labs so we can do it when he gets back.  Thanks.Dr. Alcala  ----- Message -----  From: Leora Light MA  Sent: 7/30/2020   4:33 PM CDT  To: Korin Alcala MD    Hey, this patient is going to be out of town for his birthday. Can you please send his labs to [facility] for him? Thanks

## 2020-07-31 NOTE — TELEPHONE ENCOUNTER
----- Message from Radha Morrison sent at 7/31/2020  6:47 AM CDT -----  Regarding: Pt request via [employer]  Message    Appointment Request From: Nithin Wagner    With Provider: Nithin Garza MD [[facility] - [facility]at[facility]]    Preferred Date Range: Any date 8/11/2020 or later    Preferred Times: Any Time    Reason for visit: Office Visit    Comments:  Follow up 3 months after August 10th

## 2020-08-04 DIAGNOSIS — E08.65 DIABETES MELLITUS DUE TO UNDERLYING CONDITION WITH HYPERGLYCEMIA: ICD-10-CM

## 2020-08-04 DIAGNOSIS — M06.9 RHEUMATOID ARTHRITIS INVOLVING MULTIPLE JOINTS: Primary | ICD-10-CM

## 2020-08-05 ENCOUNTER — TELEPHONE (OUTPATIENT)
Dept: RHEUMATOLOGY | Facility: CLINIC | Age: 65
End: 2020-08-05

## 2020-08-07 ENCOUNTER — TELEPHONE (OUTPATIENT)
Dept: RHEUMATOLOGY | Facility: CLINIC | Age: 65
End: 2020-08-07

## 2020-08-07 ENCOUNTER — OFFICE VISIT (OUTPATIENT)
Dept: RHEUMATOLOGY | Facility: CLINIC | Age: 65
End: 2020-08-07
Payer: MEDICARE

## 2020-08-07 DIAGNOSIS — M06.9 RHEUMATOID ARTHRITIS FLARE: Primary | ICD-10-CM

## 2020-08-07 DIAGNOSIS — Z51.81 ENCOUNTER FOR MONITORING OF ETANERCEPT THERAPY: ICD-10-CM

## 2020-08-07 DIAGNOSIS — Z79.631 ENCOUNTER FOR METHOTREXATE MONITORING: ICD-10-CM

## 2020-08-07 DIAGNOSIS — Z79.620 ENCOUNTER FOR MONITORING OF ETANERCEPT THERAPY: ICD-10-CM

## 2020-08-07 DIAGNOSIS — Z51.81 ENCOUNTER FOR METHOTREXATE MONITORING: ICD-10-CM

## 2020-08-07 PROCEDURE — 99215 OFFICE O/P EST HI 40 MIN: CPT | Mod: 95,,, | Performed by: INTERNAL MEDICINE

## 2020-08-07 PROCEDURE — 99215 PR OFFICE/OUTPT VISIT, EST, LEVL V, 40-54 MIN: ICD-10-PCS | Mod: 95,,, | Performed by: INTERNAL MEDICINE

## 2020-08-07 NOTE — PROGRESS NOTES
Subjective:       Patient ID: Nithin Wagner is a 64 y.o. male.    Chief Complaint: No chief complaint on file.    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   He was 53 when he was diagnosed with RA. He reports he reports he was having pain and swelling in knees and ankles. He then he had involvement in hands and shoulders. He was put on MTX.  He reports that the methotrexate did help with joints but it caused confusion so he stopped it for 2 years. He was taken aleve which helped his pain for 2 years.Then he developed HTN on NSAIDS so he stopped them.  He was then put on hydrocodone 5 QID to control his pain. Reports that warm weather improves his pain. He is back on methotrexate. Today, it will be his 5th dose. He is taking folic acid 1 mg po qday.  He has never tried up to 8 pills once a week. He is taking prednisone 10mg po BID for 4 weeks. He still has some pain in ankles and knees.  He does get mild swelling in knees and ankles. He was previously on prednisone 40mg a day. He smokes 1 pack per day for 40 years    family hx:cousin: connective tissue disease    Interval history:  He took his fourth dose of enbrel yesterday. He is having constipation.He is going to see his pcp next week.  He is taking lamivudine. He was diagnosed with MARI. He is taking prednisone 10mg po BID.  He is taking MTX 5 pills a week. Denies history of ulcers or diverticulitis.Denies history of CHF.  He would like to get off MTX.         Past Medical History:   Diagnosis Date    Arthritis     Diabetes mellitus     Hyperlipidemia     Hypertension        Review of Systems      Review of Systems   Constitutional: Negative for fever.   Eyes: Negative for redness.   Respiratory: Negative for cough, hemoptysis, sputum production and shortness of breath.    Cardiovascular: Negative for  chest pain.   Gastrointestinal: Negative for constipation and diarrhea.   Musculoskeletal: Positive for joint pain.   Skin: Negative for rash.   Neurological: Negative for headaches.   Endo/Heme/Allergies: Does not bruise/bleed easily.         Objective:   There were no vitals taken for this visit.     Physical Exam   Constitutional: He is oriented to person, place, and time and well-developed, well-nourished, and in no distress. No distress.   HENT:   Head: Normocephalic and atraumatic.   Right Ear: External ear normal.   Eyes: Conjunctivae and EOM are normal. Pupils are equal, round, and reactive to light. Right eye exhibits no discharge. Left eye exhibits no discharge. No scleral icterus.   Neck: Normal range of motion. Neck supple. No JVD present. No tracheal deviation present. No thyromegaly present.   Cardiovascular: Normal rate and regular rhythm.    Pulmonary/Chest: Effort normal and breath sounds normal. No stridor. No respiratory distress. He has no wheezes. He has no rales. He exhibits no tenderness.   Abdominal: Soft. Bowel sounds are normal. He exhibits no distension and no mass. There is no tenderness. There is no rebound and no guarding.   Lymphadenopathy:     He has no cervical adenopathy.   Neurological: He is alert and oriented to person, place, and time. He displays normal reflexes. No cranial nerve deficit. He exhibits normal muscle tone. Coordination normal.   Skin: Skin is warm and dry. No rash noted. He is not diaphoretic. No erythema. No pallor.     Musculoskeletal: He exhibits edema and tenderness. He exhibits no deformity.      labs: reviewed  Results for CECILIA MEAD (MRN [id number]) as of 4/14/2020 12:44   Ref. Range 4/7/2020 13:52   CCP Antibodies Latest Ref Range: <5.0 U/mL 336.9 (H)   Rheumatoid Factor Latest Ref Range: 0.0 - 15.0 IU/mL 456.0 (H)       No data to display     Assessment:     65 year old with PMH of  Type II DM, HTN, ?gout, right atrial myxoma s/p surgery in 2018, PE, right  upper lobectomy secondary to right atrial myxoma, latent TB s/p INH x6 in 1982  here  For follow up of  rheumatoid arthritis. He has severe disease and is flaring. He is requiring high doses of it to function.    Given that he has +hep B core antibody, he was started on lamivudine by hepatology. I had long discussion with patient and told him ideally, I would like to get him off methotrexate give his baseline thrombocytopenia, mild LFT elevation, and lung surgery, I am limited.    He just started enbrel so too early to see effect.  Plan:       Problem List Items Addressed This Visit     None      Stop  MTX 6 pills once a week  Continue folic acid 1 mg po qday  Continue lamivudine per hepatology  continue enbrel 50mg sub q once a week (Risks of TNF inhibitor discussed with patient and not limited to cell count abnormalities, malignancy, allergic  reaction to medication, and increased risk of infection. Patient agrees with starting medication.  Labs before next visit  Continue prednisone 10mg po BID (discussed side effects of long term steroids including weight gain, diabetes, cataracts, immunosuppression)  Educated patient on Covid virus and prevention strategies.  Labs    No rosales kinase given history of PE        The patient location is: home  The chief complaint leading to consultation is: joint pain  Visit type: Virtual visit with synchronous audio and video  Total time spent with patient: 45  Each patient to whom he or she provides medical services by telemedicine is:  (1) informed of the relationship between the physician and patient and the respective role of any other health care provider with respect to management of the patient; and (2) notified that he or she may decline to receive medical services by telemedicine and may withdraw from such care at any time.

## 2020-08-14 ENCOUNTER — LAB VISIT (OUTPATIENT)
Dept: LAB | Facility: OTHER | Age: 65
End: 2020-08-14
Attending: INTERNAL MEDICINE
Payer: MEDICARE

## 2020-08-14 DIAGNOSIS — M06.9 RHEUMATOID ARTHRITIS INVOLVING MULTIPLE JOINTS: ICD-10-CM

## 2020-08-14 LAB
ALBUMIN SERPL BCP-MCNC: 3.6 G/DL (ref 3.5–5.2)
ALP SERPL-CCNC: 72 U/L (ref 55–135)
ALT SERPL W/O P-5'-P-CCNC: 36 U/L (ref 10–44)
ANION GAP SERPL CALC-SCNC: 13 MMOL/L (ref 8–16)
AST SERPL-CCNC: 16 U/L (ref 10–40)
BASOPHILS # BLD AUTO: 0.06 K/UL (ref 0–0.2)
BASOPHILS NFR BLD: 0.8 % (ref 0–1.9)
BILIRUB SERPL-MCNC: 0.4 MG/DL (ref 0.1–1)
BUN SERPL-MCNC: 17 MG/DL (ref 8–23)
CALCIUM SERPL-MCNC: 8.7 MG/DL (ref 8.7–10.5)
CHLORIDE SERPL-SCNC: 103 MMOL/L (ref 95–110)
CO2 SERPL-SCNC: 24 MMOL/L (ref 23–29)
CREAT SERPL-MCNC: 1 MG/DL (ref 0.5–1.4)
CRP SERPL-MCNC: 0.5 MG/L (ref 0–8.2)
DIFFERENTIAL METHOD: ABNORMAL
EOSINOPHIL # BLD AUTO: 0.1 K/UL (ref 0–0.5)
EOSINOPHIL NFR BLD: 1.9 % (ref 0–8)
ERYTHROCYTE [DISTWIDTH] IN BLOOD BY AUTOMATED COUNT: 13.5 % (ref 11.5–14.5)
ERYTHROCYTE [SEDIMENTATION RATE] IN BLOOD: 2 MM/HR (ref 0–10)
EST. GFR  (AFRICAN AMERICAN): >60 ML/MIN/1.73 M^2
EST. GFR  (NON AFRICAN AMERICAN): >60 ML/MIN/1.73 M^2
GLUCOSE SERPL-MCNC: 225 MG/DL (ref 70–110)
HCT VFR BLD AUTO: 54.8 % (ref 40–54)
HGB BLD-MCNC: 18 G/DL (ref 14–18)
IMM GRANULOCYTES # BLD AUTO: 0.04 K/UL (ref 0–0.04)
IMM GRANULOCYTES NFR BLD AUTO: 0.5 % (ref 0–0.5)
LYMPHOCYTES # BLD AUTO: 1.6 K/UL (ref 1–4.8)
LYMPHOCYTES NFR BLD: 20.9 % (ref 18–48)
MCH RBC QN AUTO: 32.3 PG (ref 27–31)
MCHC RBC AUTO-ENTMCNC: 32.8 G/DL (ref 32–36)
MCV RBC AUTO: 98 FL (ref 82–98)
MONOCYTES # BLD AUTO: 0.5 K/UL (ref 0.3–1)
MONOCYTES NFR BLD: 7.1 % (ref 4–15)
NEUTROPHILS # BLD AUTO: 5.2 K/UL (ref 1.8–7.7)
NEUTROPHILS NFR BLD: 68.8 % (ref 38–73)
NRBC BLD-RTO: 0 /100 WBC
PLATELET # BLD AUTO: 162 K/UL (ref 150–350)
PMV BLD AUTO: 12.3 FL (ref 9.2–12.9)
POTASSIUM SERPL-SCNC: 4.1 MMOL/L (ref 3.5–5.1)
PROT SERPL-MCNC: 6.7 G/DL (ref 6–8.4)
RBC # BLD AUTO: 5.57 M/UL (ref 4.6–6.2)
SODIUM SERPL-SCNC: 140 MMOL/L (ref 136–145)
WBC # BLD AUTO: 7.48 K/UL (ref 3.9–12.7)

## 2020-08-14 PROCEDURE — 80053 COMPREHEN METABOLIC PANEL: CPT

## 2020-08-14 PROCEDURE — 85651 RBC SED RATE NONAUTOMATED: CPT

## 2020-08-14 PROCEDURE — 86140 C-REACTIVE PROTEIN: CPT

## 2020-08-14 PROCEDURE — 36415 COLL VENOUS BLD VENIPUNCTURE: CPT

## 2020-08-14 PROCEDURE — 85025 COMPLETE CBC W/AUTO DIFF WBC: CPT

## 2020-08-17 ENCOUNTER — OFFICE VISIT (OUTPATIENT)
Dept: HEPATOLOGY | Facility: CLINIC | Age: 65
End: 2020-08-17
Payer: MEDICARE

## 2020-08-17 VITALS
WEIGHT: 259.06 LBS | BODY MASS INDEX: 35.09 KG/M2 | OXYGEN SATURATION: 95 % | HEIGHT: 72 IN | DIASTOLIC BLOOD PRESSURE: 113 MMHG | HEART RATE: 106 BPM | RESPIRATION RATE: 18 BRPM | SYSTOLIC BLOOD PRESSURE: 187 MMHG

## 2020-08-17 DIAGNOSIS — R76.8 HEPATITIS B CORE ANTIBODY POSITIVE: Primary | ICD-10-CM

## 2020-08-17 DIAGNOSIS — K76.0 NAFLD (NONALCOHOLIC FATTY LIVER DISEASE): ICD-10-CM

## 2020-08-17 PROCEDURE — 99999 PR PBB SHADOW E&M-EST. PATIENT-LVL IV: CPT | Mod: PBBFAC,,, | Performed by: INTERNAL MEDICINE

## 2020-08-17 PROCEDURE — 99999 PR PBB SHADOW E&M-EST. PATIENT-LVL IV: ICD-10-PCS | Mod: PBBFAC,,, | Performed by: INTERNAL MEDICINE

## 2020-08-17 PROCEDURE — 99214 PR OFFICE/OUTPT VISIT, EST, LEVL IV, 30-39 MIN: ICD-10-PCS | Mod: S$PBB,,, | Performed by: INTERNAL MEDICINE

## 2020-08-17 PROCEDURE — 99214 OFFICE O/P EST MOD 30 MIN: CPT | Mod: PBBFAC | Performed by: INTERNAL MEDICINE

## 2020-08-17 PROCEDURE — 99214 OFFICE O/P EST MOD 30 MIN: CPT | Mod: S$PBB,,, | Performed by: INTERNAL MEDICINE

## 2020-08-17 NOTE — PROGRESS NOTES
Subjective:       Patient ID: Nithin Wagner is a 65 y.o. male.    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  He is currently on high dose prednisone but was on methotrexate for about 1.5 years until 2017.    He describes jaundice twice- first time in 1974 after a needle stick accident at work and the second time when he was in [place].    I note that he is currently HCV ab negative but is HBVcAb+ve.    He is overweight with a BMI of 34 and has Diabetes.  He describes significant intermittent fatigue that appears not to be associated with flares of his RA.    Weight increased from 212 to 250 lb in last 8 years.    Abdo US: 5/5/20  Hepatic steatosis with focal fatty sparing near the gallbladder fossa.  No hepatic mass lesions    Fibroscan: 5/5/20  F0-1  S3    PMH:  Type 2 DM  Hypertension  Gout  Hyperlipidemia  Left atrial myxoma- 2007  Right upper lobectomy- 2009    SH:  No alcohol  35 pack years of cigarette smoking    FH:  Niece has HCV    Review of Systems   Constitutional: Positive for fatigue. Negative for activity change, appetite change, chills, fever and unexpected weight change.   HENT: Negative for hearing loss.    Eyes: Negative for discharge and visual disturbance.   Respiratory: Negative for cough, chest tightness, shortness of breath and wheezing.    Cardiovascular: Negative for chest pain, palpitations and leg swelling.   Gastrointestinal: Negative for abdominal distention, abdominal pain, constipation, diarrhea and nausea.   Genitourinary: Negative for dysuria and frequency.   Musculoskeletal: Negative for arthralgias and back pain.   Skin: Negative for pallor and rash.   Neurological: Negative for dizziness, tremors, speech difficulty and headaches.   Hematological: Negative for adenopathy.   Psychiatric/Behavioral: Negative for agitation and confusion.         Lab Results   Component Value  "Date    ALT 36 08/14/2020    AST 16 08/14/2020    ALKPHOS 72 08/14/2020    BILITOT 0.4 08/14/2020     Past Medical History:   Diagnosis Date    Arthritis     Diabetes mellitus     Hyperlipidemia     Hypertension      Past Surgical History:   Procedure Laterality Date    mrxoma removed      PLEURA BIOPSY       Current Outpatient Medications   Medication Sig    ACCU-CHEK MULTICLIX LANCET lancets     BD ULTRA-FINE RAHUL PEN NEEDLES 32 x 5/32 " Ndle     BENICAR 20 mg tablet Take 20 mg by mouth once daily.    entanercept (ENBREL) 50 mg/mL injection Inject 1 mL (50 mg total) into the skin every 7 days.    escitalopram oxalate (LEXAPRO) 20 MG tablet     folic acid (FOLVITE) 1 MG tablet     furosemide (LASIX) 40 MG tablet Take 40 mg by mouth as needed.    hydroCHLOROthiazide (HYDRODIURIL) 12.5 MG Tab Take 12.5 mg by mouth once daily.    hydrocodone-acetaminophen 10-325mg (NORCO)  mg Tab     lamiVUDine (EPIVIR) 150 MG Tab Take 150 mg by mouth once daily.    lorazepam (ATIVAN) 1 MG tablet     methotrexate 2.5 MG Tab Take 12.5 mg by mouth every 7 days.    NOVOLOG FLEXPEN 100 unit/mL InPn pen 50 Units 3 (three) times daily with meals.     OXYGEN-AIR DELIVERY SYSTEMS MISC by Misc.(Non-Drug; Combo Route) route.    potassium chloride (MICRO-K) 10 MEQ CpSR     predniSONE (DELTASONE) 10 MG tablet Take 10 mg by mouth 2 (two) times daily.    PROAIR HFA 90 mcg/actuation inhaler     TRESIBA FLEXTOUCH U-200 200 unit/mL (3 mL) InPn 50 Units 2 (two) times daily.      No current facility-administered medications for this visit.        Objective:      Physical Exam  HENT:      Head: Normocephalic.   Eyes:      Pupils: Pupils are equal, round, and reactive to light.   Neck:      Thyroid: No thyromegaly.   Cardiovascular:      Rate and Rhythm: Normal rate and regular rhythm.      Heart sounds: Normal heart sounds.   Pulmonary:      Effort: Pulmonary effort is normal.      Breath sounds: Normal breath sounds. No " wheezing.   Abdominal:      General: There is no distension.      Palpations: Abdomen is soft. There is no mass.      Tenderness: There is no abdominal tenderness.   Lymphadenopathy:      Cervical: No cervical adenopathy.   Skin:     General: Skin is warm.      Findings: No erythema or rash.   Neurological:      Mental Status: He is alert and oriented to person, place, and time.   Psychiatric:         Behavior: Behavior normal.             Assessment:       1. Hepatitis B core antibody positive    2. NAFLD (nonalcoholic fatty liver disease)        Plan:   From the liver standpoint we are dealing with 2 issues:    1) HBVcAb positivity- already on prophylaxis    2) Likely NAFLD- borderline LFTs in someone with DM and high BMI- reassuring fibroscan- no fibrosis    Started on enbrel + Methotrexate + prednisone (20 mg) for RA  Asked to see PCP regarding his high BP.    Clinic in 6 months

## 2020-08-21 PROBLEM — K76.0 NAFLD (NONALCOHOLIC FATTY LIVER DISEASE): Status: ACTIVE | Noted: 2020-08-21

## 2020-08-28 ENCOUNTER — HOSPITAL ENCOUNTER (OUTPATIENT)
Dept: RADIOLOGY | Facility: OTHER | Age: 65
Discharge: HOME | End: 2020-08-28
Attending: INTERNAL MEDICINE
Payer: MEDICARE

## 2020-08-28 DIAGNOSIS — Z79.899 ENCOUNTER FOR LONG-TERM (CURRENT) USE OF OTHER MEDICATIONS: ICD-10-CM

## 2020-08-28 DIAGNOSIS — Z79.52 LONG TERM CURRENT USE OF SYSTEMIC STEROIDS: ICD-10-CM

## 2020-08-28 DIAGNOSIS — M25.50 POLYARTHRALGIA: ICD-10-CM

## 2020-08-28 PROCEDURE — 77077 JOINT SURVEY SINGLE VIEW: CPT | Mod: 26,,, | Performed by: RADIOLOGY

## 2020-08-28 PROCEDURE — 77077 JOINT SURVEY SINGLE VIEW: CPT | Mod: TC

## 2020-08-28 PROCEDURE — 77077 XR ARTHRITIS SURVEY: ICD-10-PCS | Mod: 26,,, | Performed by: RADIOLOGY

## 2020-08-28 PROCEDURE — 77080 DXA BONE DENSITY AXIAL: CPT | Mod: 26,,, | Performed by: RADIOLOGY

## 2020-08-28 PROCEDURE — 77080 DXA BONE DENSITY AXIAL: CPT | Mod: TC

## 2020-08-28 PROCEDURE — 77080 DEXA BONE DENSITY SPINE HIP: ICD-10-PCS | Mod: 26,,, | Performed by: RADIOLOGY

## 2020-09-10 ENCOUNTER — HOSPITAL ENCOUNTER (OUTPATIENT)
Dept: RADIOLOGY | Facility: OTHER | Age: 65
Discharge: HOME | End: 2020-09-10
Attending: INTERNAL MEDICINE
Payer: MEDICARE

## 2020-09-10 DIAGNOSIS — K59.00 CN (CONSTIPATION): ICD-10-CM

## 2020-09-10 PROCEDURE — 74019 RADEX ABDOMEN 2 VIEWS: CPT | Mod: 26,,, | Performed by: RADIOLOGY

## 2020-09-10 PROCEDURE — 74019 XR ABDOMEN FLAT AND ERECT: ICD-10-PCS | Mod: 26,,, | Performed by: RADIOLOGY

## 2020-09-10 PROCEDURE — 74019 RADEX ABDOMEN 2 VIEWS: CPT | Mod: TC,FY

## 2020-09-22 ENCOUNTER — LAB VISIT (OUTPATIENT)
Dept: LAB | Facility: OTHER | Age: 65
End: 2020-09-22
Attending: INTERNAL MEDICINE
Payer: MEDICARE

## 2020-09-22 DIAGNOSIS — I10 ESSENTIAL HYPERTENSION, MALIGNANT: ICD-10-CM

## 2020-09-22 DIAGNOSIS — M06.9 RHEUMATOID ARTHRITIS INVOLVING MULTIPLE JOINTS: ICD-10-CM

## 2020-09-22 DIAGNOSIS — R06.02 SHORTNESS OF BREATH: ICD-10-CM

## 2020-09-22 DIAGNOSIS — K76.0 FATTY METAMORPHOSIS OF LIVER: ICD-10-CM

## 2020-09-22 DIAGNOSIS — Z79.899 ENCOUNTER FOR LONG-TERM (CURRENT) USE OF OTHER MEDICATIONS: Primary | ICD-10-CM

## 2020-09-22 DIAGNOSIS — K59.09 OTHER CONSTIPATION: ICD-10-CM

## 2020-09-22 DIAGNOSIS — M06.9 RHEUMATOID ARTHRITIS FLARE: ICD-10-CM

## 2020-09-22 DIAGNOSIS — M06.9 ATROPHIC ARTHRITIS: ICD-10-CM

## 2020-09-22 DIAGNOSIS — B18.8: ICD-10-CM

## 2020-09-22 DIAGNOSIS — E08.65 DIABETES MELLITUS DUE TO UNDERLYING CONDITION WITH HYPERGLYCEMIA: ICD-10-CM

## 2020-09-22 LAB
ALBUMIN SERPL BCP-MCNC: 3.7 G/DL (ref 3.5–5.2)
ALP SERPL-CCNC: 72 U/L (ref 55–135)
ALT SERPL W/O P-5'-P-CCNC: 45 U/L (ref 10–44)
ANION GAP SERPL CALC-SCNC: 9 MMOL/L (ref 8–16)
AST SERPL-CCNC: 23 U/L (ref 10–40)
BASOPHILS # BLD AUTO: 0.04 K/UL (ref 0–0.2)
BASOPHILS NFR BLD: 0.7 % (ref 0–1.9)
BILIRUB SERPL-MCNC: 0.6 MG/DL (ref 0.1–1)
BNP SERPL-MCNC: 81 PG/ML (ref 0–99)
BUN SERPL-MCNC: 13 MG/DL (ref 8–23)
CALCIUM SERPL-MCNC: 8.7 MG/DL (ref 8.7–10.5)
CHLORIDE SERPL-SCNC: 107 MMOL/L (ref 95–110)
CO2 SERPL-SCNC: 24 MMOL/L (ref 23–29)
CORTIS SERPL-MCNC: 5.3 UG/DL
CREAT SERPL-MCNC: 0.9 MG/DL (ref 0.5–1.4)
CRP SERPL-MCNC: 0.6 MG/L (ref 0–8.2)
D DIMER PPP IA.FEU-MCNC: 0.45 MG/L FEU
DIFFERENTIAL METHOD: ABNORMAL
EOSINOPHIL # BLD AUTO: 0.1 K/UL (ref 0–0.5)
EOSINOPHIL NFR BLD: 2.1 % (ref 0–8)
ERYTHROCYTE [DISTWIDTH] IN BLOOD BY AUTOMATED COUNT: 12.3 % (ref 11.5–14.5)
ERYTHROCYTE [SEDIMENTATION RATE] IN BLOOD: 1 MM/HR (ref 0–10)
EST. GFR  (AFRICAN AMERICAN): >60 ML/MIN/1.73 M^2
EST. GFR  (NON AFRICAN AMERICAN): >60 ML/MIN/1.73 M^2
ESTIMATED AVG GLUCOSE: 206 MG/DL (ref 68–131)
GLUCOSE SERPL-MCNC: 169 MG/DL (ref 70–110)
HBA1C MFR BLD HPLC: 8.8 % (ref 4–5.6)
HCT VFR BLD AUTO: 54.8 % (ref 40–54)
HGB BLD-MCNC: 18.1 G/DL (ref 14–18)
IMM GRANULOCYTES # BLD AUTO: 0.05 K/UL (ref 0–0.04)
IMM GRANULOCYTES NFR BLD AUTO: 0.9 % (ref 0–0.5)
LYMPHOCYTES # BLD AUTO: 1.9 K/UL (ref 1–4.8)
LYMPHOCYTES NFR BLD: 33.8 % (ref 18–48)
MCH RBC QN AUTO: 32.8 PG (ref 27–31)
MCHC RBC AUTO-ENTMCNC: 33 G/DL (ref 32–36)
MCV RBC AUTO: 100 FL (ref 82–98)
MONOCYTES # BLD AUTO: 0.4 K/UL (ref 0.3–1)
MONOCYTES NFR BLD: 7.2 % (ref 4–15)
NEUTROPHILS # BLD AUTO: 3.1 K/UL (ref 1.8–7.7)
NEUTROPHILS NFR BLD: 55.3 % (ref 38–73)
NRBC BLD-RTO: 0 /100 WBC
PLATELET # BLD AUTO: 137 K/UL (ref 150–350)
PMV BLD AUTO: 11.9 FL (ref 9.2–12.9)
POTASSIUM SERPL-SCNC: 4.3 MMOL/L (ref 3.5–5.1)
PROT SERPL-MCNC: 6.7 G/DL (ref 6–8.4)
RBC # BLD AUTO: 5.51 M/UL (ref 4.6–6.2)
SODIUM SERPL-SCNC: 140 MMOL/L (ref 136–145)
T3FREE SERPL-MCNC: 2.9 PG/ML (ref 2.3–4.2)
T4 FREE SERPL-MCNC: 0.98 NG/DL (ref 0.71–1.51)
TSH SERPL DL<=0.005 MIU/L-ACNC: 1.08 UIU/ML (ref 0.4–4)
WBC # BLD AUTO: 5.68 K/UL (ref 3.9–12.7)

## 2020-09-22 PROCEDURE — 84439 ASSAY OF FREE THYROXINE: CPT

## 2020-09-22 PROCEDURE — 82533 TOTAL CORTISOL: CPT

## 2020-09-22 PROCEDURE — 83880 ASSAY OF NATRIURETIC PEPTIDE: CPT

## 2020-09-22 PROCEDURE — 84481 FREE ASSAY (FT-3): CPT

## 2020-09-22 PROCEDURE — 83036 HEMOGLOBIN GLYCOSYLATED A1C: CPT

## 2020-09-22 PROCEDURE — 85379 FIBRIN DEGRADATION QUANT: CPT

## 2020-09-22 PROCEDURE — 87516 HEPATITIS B DNA AMP PROBE: CPT

## 2020-09-22 PROCEDURE — 86140 C-REACTIVE PROTEIN: CPT

## 2020-09-22 PROCEDURE — 83036 HEMOGLOBIN GLYCOSYLATED A1C: CPT | Mod: 91

## 2020-09-22 PROCEDURE — 85651 RBC SED RATE NONAUTOMATED: CPT

## 2020-09-22 PROCEDURE — 87517 HEPATITIS B DNA QUANT: CPT

## 2020-09-22 PROCEDURE — 85025 COMPLETE CBC W/AUTO DIFF WBC: CPT

## 2020-09-22 PROCEDURE — 84443 ASSAY THYROID STIM HORMONE: CPT

## 2020-09-22 PROCEDURE — 80053 COMPREHEN METABOLIC PANEL: CPT

## 2020-09-23 LAB
ESTIMATED AVG GLUCOSE: 200 MG/DL (ref 68–131)
HBA1C MFR BLD HPLC: 8.6 % (ref 4–5.6)

## 2020-09-25 ENCOUNTER — PATIENT MESSAGE (OUTPATIENT)
Dept: HEPATOLOGY | Facility: CLINIC | Age: 65
End: 2020-09-25

## 2020-09-25 DIAGNOSIS — R76.8 HEPATITIS B CORE ANTIBODY POSITIVE: Primary | ICD-10-CM

## 2020-09-25 LAB
HBV DNA SERPL NAA+PROBE-ACNC: <10 IU/ML
HBV DNA SERPL NAA+PROBE-LOG IU: <1 LOG (10) IU/ML
HBV DNA SERPL QL NAA+PROBE: NOT DETECTED

## 2020-09-28 ENCOUNTER — HOSPITAL ENCOUNTER (OUTPATIENT)
Dept: RADIOLOGY | Facility: OTHER | Age: 65
Discharge: HOME | End: 2020-09-28
Attending: INTERNAL MEDICINE
Payer: MEDICARE

## 2020-09-28 DIAGNOSIS — F17.200 CURRENT SMOKER: ICD-10-CM

## 2020-09-28 DIAGNOSIS — R06.02 SHORTNESS OF BREATH: ICD-10-CM

## 2020-09-28 DIAGNOSIS — R06.02 SHORTNESS OF BREATH: Primary | ICD-10-CM

## 2020-09-28 DIAGNOSIS — I10 HYPERTENSION: ICD-10-CM

## 2020-09-28 DIAGNOSIS — R76.8 HEPATITIS B CORE ANTIBODY POSITIVE: ICD-10-CM

## 2020-09-28 PROCEDURE — 76700 US EXAM ABDOM COMPLETE: CPT | Mod: 26,,, | Performed by: RADIOLOGY

## 2020-09-28 PROCEDURE — 71046 X-RAY EXAM CHEST 2 VIEWS: CPT | Mod: TC,FY

## 2020-09-28 PROCEDURE — 71046 X-RAY EXAM CHEST 2 VIEWS: CPT | Mod: 26,,, | Performed by: RADIOLOGY

## 2020-09-28 PROCEDURE — 76700 US ABDOMEN COMPLETE: ICD-10-PCS | Mod: 26,,, | Performed by: RADIOLOGY

## 2020-09-28 PROCEDURE — 71046 XR CHEST PA AND LATERAL: ICD-10-PCS | Mod: 26,,, | Performed by: RADIOLOGY

## 2020-09-28 PROCEDURE — 76700 US EXAM ABDOM COMPLETE: CPT | Mod: TC

## 2020-09-30 ENCOUNTER — PATIENT MESSAGE (OUTPATIENT)
Dept: HEPATOLOGY | Facility: CLINIC | Age: 65
End: 2020-09-30

## 2020-10-02 ENCOUNTER — OFFICE VISIT (OUTPATIENT)
Dept: RHEUMATOLOGY | Facility: CLINIC | Age: 65
End: 2020-10-02
Payer: MEDICARE

## 2020-10-02 DIAGNOSIS — Z79.620 ENCOUNTER FOR MONITORING OF ETANERCEPT THERAPY: Primary | ICD-10-CM

## 2020-10-02 DIAGNOSIS — Z51.81 ENCOUNTER FOR MONITORING OF ETANERCEPT THERAPY: Primary | ICD-10-CM

## 2020-10-02 DIAGNOSIS — M06.9 RHEUMATOID ARTHRITIS INVOLVING MULTIPLE JOINTS: Primary | ICD-10-CM

## 2020-10-02 DIAGNOSIS — M06.9 RHEUMATOID ARTHRITIS INVOLVING MULTIPLE JOINTS: ICD-10-CM

## 2020-10-02 DIAGNOSIS — Z79.52 ENCOUNTER FOR MONITORING OF SYSTEMIC STEROID THERAPY: ICD-10-CM

## 2020-10-02 DIAGNOSIS — Z51.81 ENCOUNTER FOR MONITORING OF SYSTEMIC STEROID THERAPY: ICD-10-CM

## 2020-10-02 PROCEDURE — 99214 PR OFFICE/OUTPT VISIT, EST, LEVL IV, 30-39 MIN: ICD-10-PCS | Mod: 95,,, | Performed by: INTERNAL MEDICINE

## 2020-10-02 PROCEDURE — 99214 OFFICE O/P EST MOD 30 MIN: CPT | Mod: 95,,, | Performed by: INTERNAL MEDICINE

## 2020-10-02 NOTE — PROGRESS NOTES
Subjective:       Patient ID: Nithin Wagner is a 64 y.o. male.    Chief Complaint: No chief complaint on file.    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   He was 53 when he was diagnosed with RA. He reports he reports he was having pain and swelling in knees and ankles. He then he had involvement in hands and shoulders. He was put on MTX.  He reports that the methotrexate did help with joints but it caused confusion so he stopped it for 2 years. He was taken aleve which helped his pain for 2 years.Then he developed HTN on NSAIDS so he stopped them.  He was then put on hydrocodone 5 QID to control his pain. Reports that warm weather improves his pain. He is back on methotrexate. Today, it will be his 5th dose. He is taking folic acid 1 mg po qday.  He has never tried up to 8 pills once a week. He is taking prednisone 10mg po BID for 4 weeks. He still has some pain in ankles and knees.  He does get mild swelling in knees and ankles. He was previously on prednisone 40mg a day. He smokes 1 pack per day for 40 years    family hx:cousin: connective tissue disease    Interval history:   He is off mtx.He is on enbrel. Reports fatigue has improved.   He is taking lamivudine. He was diagnosed with MARI. He is taking prednisone 10mg ad a day for 5 days and he plans to go down to 5mg a day.   Denies history of ulcers or diverticulitis.Denies history of CHF.  He would like to get off MTX.  He had recent tooth infection.       Past Medical History:   Diagnosis Date    Arthritis     Diabetes mellitus     Hyperlipidemia     Hypertension        Review of Systems      Review of Systems   Constitutional: Negative for fever.   Eyes: Negative for redness.   Respiratory: Negative for cough, hemoptysis, sputum production and shortness of breath.    Cardiovascular: Negative for  chest pain.   Gastrointestinal: Negative for constipation and diarrhea.   Musculoskeletal: Positive for joint pain.   Skin: Negative for rash.   Neurological: Negative for headaches.   Endo/Heme/Allergies: Does not bruise/bleed easily.         Objective:   There were no vitals taken for this visit.     Physical Exam   Constitutional: He is oriented to person, place, and time and well-developed, well-nourished, and in no distress. No distress.   HENT:   Head: Normocephalic and atraumatic.   Right Ear: External ear normal.   Eyes: Conjunctivae and EOM are normal. Pupils are equal, round, and reactive to light. Right eye exhibits no discharge. Left eye exhibits no discharge. No scleral icterus.   Neck: Normal range of motion. Neck supple. No JVD present. No tracheal deviation present. No thyromegaly present.   Cardiovascular: Normal rate and regular rhythm.    Pulmonary/Chest: Effort normal and breath sounds normal. No stridor. No respiratory distress. He has no wheezes. He has no rales. He exhibits no tenderness.   Abdominal: Soft. Bowel sounds are normal. He exhibits no distension and no mass. There is no tenderness. There is no rebound and no guarding.   Lymphadenopathy:     He has no cervical adenopathy.   Neurological: He is alert and oriented to person, place, and time. He displays normal reflexes. No cranial nerve deficit. He exhibits normal muscle tone. Coordination normal.   Skin: Skin is warm and dry. No rash noted. He is not diaphoretic. No erythema. No pallor.     Musculoskeletal: He exhibits edema and tenderness. He exhibits no deformity.      labs: reviewed  Results for CECILIA MEAD (MRN [id number]) as of 4/14/2020 12:44   Ref. Range 4/7/2020 13:52   CCP Antibodies Latest Ref Range: <5.0 U/mL 336.9 (H)   Rheumatoid Factor Latest Ref Range: 0.0 - 15.0 IU/mL 456.0 (H)       No data to display     Assessment:     65 year old with PMH of  Type II DM, HTN, ?gout, right atrial myxoma s/p surgery in 2018, PE, right  upper lobectomy secondary to right atrial myxoma, latent TB s/p INH x6 in 1982  here  For follow up of  rheumatoid arthritis. He was requiring high doses of steroid to function when we initially met.    Given that he has +hep B core antibody, he was started on lamivudine by hepatology. I had long discussion with patient and told him ideally, I would like to get him off methotrexate give his baseline thrombocytopenia, mild LFT elevation, and lung surgery, I am limited.    He just started enbrel so too early to see effect.  He is having some GI discomfort with lamivudine so he will touch base with hepatology.  Plan:       Problem List Items Addressed This Visit     None      No MTX given MARI and baseline thrombocytopenia  Continue folic acid 1 mg po qday  Continue lamivudine per hepatology  continue enbrel 50mg sub q once a week (Risks of TNF inhibitor discussed with patient and not limited to cell count abnormalities, malignancy, allergic  reaction to medication, and increased risk of infection. Patient agrees with starting medication.  Labs before next visit  Continue prednisone 5 mg a day for one week and then stop   (discussed side effects of long term steroids including weight gain, diabetes, cataracts, immunosuppression)  Educated patient on Covid virus and prevention strategies.   NO ROSALES KINASE given history of PE  Labs    No rosales kinase given history of PE    The patient location is:home    The chief complaint leading to consultation is:joint pain    Visit type: audiovisual    Face to Face time with patient: 30   minutes of total time spent on the encounter, which includes face to face time and non-face to face time preparing to see the patient (eg, review of tests), Obtaining and/or reviewing separately obtained history, Documenting clinical information in the electronic or other health record, Independently interpreting results (not separately reported) and communicating results to the  patient/family/caregiver, or Care coordination (not separately reported).         Each patient to whom he or she provides medical services by telemedicine is:  (1) informed of the relationship between the physician and patient and the respective role of any other health care provider with respect to management of the patient; and (2) notified that he or she may decline to receive medical services by telemedicine and may withdraw from such care at any time.    Notes:

## 2020-10-05 ENCOUNTER — PATIENT MESSAGE (OUTPATIENT)
Dept: RHEUMATOLOGY | Facility: CLINIC | Age: 65
End: 2020-10-05

## 2020-10-19 ENCOUNTER — PATIENT MESSAGE (OUTPATIENT)
Dept: RHEUMATOLOGY | Facility: CLINIC | Age: 65
End: 2020-10-19

## 2020-11-04 ENCOUNTER — PATIENT MESSAGE (OUTPATIENT)
Dept: RHEUMATOLOGY | Facility: CLINIC | Age: 65
End: 2020-11-04

## 2020-11-06 ENCOUNTER — PATIENT MESSAGE (OUTPATIENT)
Dept: RHEUMATOLOGY | Facility: CLINIC | Age: 65
End: 2020-11-06

## 2020-11-09 ENCOUNTER — TELEPHONE (OUTPATIENT)
Dept: RHEUMATOLOGY | Facility: CLINIC | Age: 65
End: 2020-11-09

## 2020-11-09 NOTE — TELEPHONE ENCOUNTER
----- Message from Tova Rutherford sent at 11/9/2020 10:12 AM CST -----   pharmacy needing a verbal ok to replace broken/damaged Enbrel for Patient Nithin Wagner MRN [id number]      Please contact

## 2020-11-10 ENCOUNTER — HOSPITAL ENCOUNTER (OUTPATIENT)
Dept: CARDIOLOGY | Facility: OTHER | Age: 65
Discharge: HOME | End: 2020-11-10
Attending: SURGERY
Payer: MEDICARE

## 2020-11-10 VITALS
WEIGHT: 259 LBS | BODY MASS INDEX: 35.08 KG/M2 | DIASTOLIC BLOOD PRESSURE: 94 MMHG | HEART RATE: 106 BPM | SYSTOLIC BLOOD PRESSURE: 142 MMHG | HEIGHT: 72 IN

## 2020-11-10 DIAGNOSIS — R06.2 WHEEZING: Primary | ICD-10-CM

## 2020-11-10 DIAGNOSIS — R06.2 WHEEZING: ICD-10-CM

## 2020-11-10 DIAGNOSIS — R60.1 GENERALIZED EDEMA: ICD-10-CM

## 2020-11-10 PROCEDURE — 93306 ECHO (CUPID ONLY): ICD-10-PCS | Mod: 26,,, | Performed by: INTERNAL MEDICINE

## 2020-11-10 PROCEDURE — 93306 TTE W/DOPPLER COMPLETE: CPT

## 2020-11-10 PROCEDURE — 93306 TTE W/DOPPLER COMPLETE: CPT | Mod: 26,,, | Performed by: INTERNAL MEDICINE

## 2020-11-11 LAB
ASCENDING AORTA: 3.72 CM
AV INDEX (PROSTH): 1.02
AV MEAN GRADIENT: 2 MMHG
AV PEAK GRADIENT: 4 MMHG
AV VALVE AREA: 4.29 CM2
AV VELOCITY RATIO: 0.91
BSA FOR ECHO PROCEDURE: 2.44 M2
CV ECHO LV RWT: 0.45 CM
DOP CALC AO PEAK VEL: 0.94 M/S
DOP CALC AO VTI: 15.54 CM
DOP CALC LVOT AREA: 4.2 CM2
DOP CALC LVOT DIAMETER: 2.31 CM
DOP CALC LVOT PEAK VEL: 0.86 M/S
DOP CALC LVOT STROKE VOLUME: 66.64 CM3
DOP CALCLVOT PEAK VEL VTI: 15.91 CM
E WAVE DECELERATION TIME: 70.81 MSEC
E/A RATIO: 0.65
E/E' RATIO: 8.46 M/S
ECHO LV POSTERIOR WALL: 1.19 CM (ref 0.6–1.1)
FRACTIONAL SHORTENING: 24 % (ref 28–44)
INTERVENTRICULAR SEPTUM: 1.18 CM (ref 0.6–1.1)
IVRT: 85.03 MSEC
LA MAJOR: 4.66 CM
LA MINOR: 5.01 CM
LA WIDTH: 3.44 CM
LEFT ATRIUM SIZE: 4.25 CM
LEFT ATRIUM VOLUME INDEX MOD: 21 ML/M2
LEFT ATRIUM VOLUME INDEX: 25.2 ML/M2
LEFT ATRIUM VOLUME MOD: 50 CM3
LEFT ATRIUM VOLUME: 60.01 CM3
LEFT INTERNAL DIMENSION IN SYSTOLE: 4.01 CM (ref 2.1–4)
LEFT VENTRICLE DIASTOLIC VOLUME INDEX: 56.91 ML/M2
LEFT VENTRICLE DIASTOLIC VOLUME: 135.33 ML
LEFT VENTRICLE MASS INDEX: 106 G/M2
LEFT VENTRICLE SYSTOLIC VOLUME INDEX: 29.5 ML/M2
LEFT VENTRICLE SYSTOLIC VOLUME: 70.22 ML
LEFT VENTRICULAR INTERNAL DIMENSION IN DIASTOLE: 5.3 CM (ref 3.5–6)
LEFT VENTRICULAR MASS: 252.15 G
LV LATERAL E/E' RATIO: 7.86 M/S
LV SEPTAL E/E' RATIO: 9.17 M/S
MV PEAK A VEL: 0.85 M/S
MV PEAK E VEL: 0.55 M/S
MV STENOSIS PRESSURE HALF TIME: 20.53 MS
MV VALVE AREA P 1/2 METHOD: 10.72 CM2
PULM VEIN S/D RATIO: 1.64
PV PEAK D VEL: 0.33 M/S
PV PEAK S VEL: 0.54 M/S
PV PEAK VELOCITY: 0.88 CM/S
RA MAJOR: 4.19 CM
RA PRESSURE: 3 MMHG
RA WIDTH: 3.13 CM
RIGHT VENTRICULAR END-DIASTOLIC DIMENSION: 2.2 CM
RV TISSUE DOPPLER FREE WALL SYSTOLIC VELOCITY 1 (APICAL 4 CHAMBER VIEW): 12.85 CM/S
SINUS: 4.07 CM
STJ: 3.95 CM
TDI LATERAL: 0.07 M/S
TDI SEPTAL: 0.06 M/S
TDI: 0.07 M/S
TRICUSPID ANNULAR PLANE SYSTOLIC EXCURSION: 1.99 CM

## 2020-11-12 ENCOUNTER — OFFICE VISIT (OUTPATIENT)
Dept: CARDIOLOGY | Facility: CLINIC | Age: 65
End: 2020-11-12
Payer: MEDICARE

## 2020-11-12 ENCOUNTER — PATIENT MESSAGE (OUTPATIENT)
Dept: RHEUMATOLOGY | Facility: CLINIC | Age: 65
End: 2020-11-12

## 2020-11-12 VITALS
BODY MASS INDEX: 35.72 KG/M2 | SYSTOLIC BLOOD PRESSURE: 148 MMHG | DIASTOLIC BLOOD PRESSURE: 96 MMHG | WEIGHT: 263.69 LBS | HEIGHT: 72 IN

## 2020-11-12 DIAGNOSIS — Z79.4 TYPE 2 DIABETES MELLITUS WITH OTHER CIRCULATORY COMPLICATION, WITH LONG-TERM CURRENT USE OF INSULIN: ICD-10-CM

## 2020-11-12 DIAGNOSIS — I50.21 ACUTE SYSTOLIC HEART FAILURE: Primary | ICD-10-CM

## 2020-11-12 DIAGNOSIS — E11.59 TYPE 2 DIABETES MELLITUS WITH OTHER CIRCULATORY COMPLICATION, WITH LONG-TERM CURRENT USE OF INSULIN: ICD-10-CM

## 2020-11-12 DIAGNOSIS — E66.01 SEVERE OBESITY: ICD-10-CM

## 2020-11-12 DIAGNOSIS — I10 ESSENTIAL HYPERTENSION: ICD-10-CM

## 2020-11-12 PROBLEM — E11.9 TYPE 2 DIABETES MELLITUS: Status: ACTIVE | Noted: 2020-11-12

## 2020-11-12 PROBLEM — I50.20 SYSTOLIC HEART FAILURE: Status: ACTIVE | Noted: 2020-11-12

## 2020-11-12 PROCEDURE — 93010 EKG 12-LEAD: ICD-10-PCS | Mod: ,,, | Performed by: INTERNAL MEDICINE

## 2020-11-12 PROCEDURE — 99214 OFFICE O/P EST MOD 30 MIN: CPT | Mod: PBBFAC | Performed by: INTERNAL MEDICINE

## 2020-11-12 PROCEDURE — 99205 PR OFFICE/OUTPT VISIT, NEW, LEVL V, 60-74 MIN: ICD-10-PCS | Mod: 25,S$PBB,, | Performed by: INTERNAL MEDICINE

## 2020-11-12 PROCEDURE — 99999 PR PBB SHADOW E&M-EST. PATIENT-LVL IV: CPT | Mod: PBBFAC,,, | Performed by: INTERNAL MEDICINE

## 2020-11-12 PROCEDURE — 99999 PR PBB SHADOW E&M-EST. PATIENT-LVL IV: ICD-10-PCS | Mod: PBBFAC,,, | Performed by: INTERNAL MEDICINE

## 2020-11-12 PROCEDURE — 99205 OFFICE O/P NEW HI 60 MIN: CPT | Mod: 25,S$PBB,, | Performed by: INTERNAL MEDICINE

## 2020-11-12 PROCEDURE — 93005 ELECTROCARDIOGRAM TRACING: CPT

## 2020-11-12 PROCEDURE — 93010 ELECTROCARDIOGRAM REPORT: CPT | Mod: ,,, | Performed by: INTERNAL MEDICINE

## 2020-11-12 RX ORDER — LOSARTAN POTASSIUM 100 MG/1
100 TABLET ORAL DAILY
COMMUNITY
Start: 2020-11-09 | End: 2021-08-09

## 2020-11-12 RX ORDER — NAPROXEN SODIUM 220 MG/1
81 TABLET, FILM COATED ORAL
COMMUNITY

## 2020-11-12 NOTE — PROGRESS NOTES
"[facility]    Chief Complaint  Chief Complaint   Patient presents with    Shortness of Breath       HPI:    The patient is referred for cardiac evaluation.  He has been short of breath and swelling up for a few weeks.  It has been improving since being on Lasix 80 mg.  He is unable to lie flat without getting dyspneic.  He is short of breath walking up a flight of stairs which he could do without any problems just a few months ago.  He has no associated chest discomfort.  Bilateral lower extremity edema has improved.    Medications  Current Outpatient Medications   Medication Sig Dispense Refill    ACCU-CHEK MULTICLIX LANCET lancets   5    aspirin 81 MG Chew Take 81 mg by mouth.      BD ULTRA-FINE RAHUL PEN NEEDLES 32 x 5/32 " Ndle   9    entanercept (ENBREL) 50 mg/mL injection Inject 1 mL (50 mg total) into the skin every 7 days. 4 mL 11    escitalopram oxalate (LEXAPRO) 20 MG tablet   4    folic acid (FOLVITE) 1 MG tablet   4    furosemide (LASIX) 40 MG tablet Take 80 mg by mouth as needed.       hydroCHLOROthiazide (HYDRODIURIL) 12.5 MG Tab Take 12.5 mg by mouth once daily.      hydrocodone-acetaminophen 10-325mg (NORCO)  mg Tab   0    lamiVUDine (EPIVIR) 150 MG Tab TAKE 1 TABLET(150 MG) BY MOUTH EVERY DAY 30 tablet 6    lorazepam (ATIVAN) 1 MG tablet   1    losartan (COZAAR) 100 MG tablet 100 mg once daily.      NOVOLOG FLEXPEN 100 unit/mL InPn pen 50 Units 3 (three) times daily with meals.   1    OXYGEN-AIR DELIVERY SYSTEMS MISC by [facility].(Non-Drug; Combo Route) route.      potassium chloride (MICRO-K) 10 MEQ CpSR   4    PROAIR HFA 90 mcg/actuation inhaler       TRESIBA FLEXTOUCH U-200 200 unit/mL (3 mL) InPn 50 Units 2 (two) times daily.        No current facility-administered medications for this visit.         History  Past Medical History:   Diagnosis Date    Arthritis     Diabetes mellitus     Hyperlipidemia     Hypertension      Past Surgical History:   Procedure " Laterality Date    PLEURA BIOPSY      RESECTION OF ATRIAL MYXOMA  2007     Social History     Socioeconomic History    Marital status: Single     Spouse name: Not on file    Number of children: Not on file    Years of education: Not on file    Highest education level: Not on file   Occupational History    Not on file   Social Needs    Financial resource strain: Not on file    Food insecurity     Worry: Not on file     Inability: Not on file    Transportation needs     Medical: Not on file     Non-medical: Not on file   Tobacco Use    Smoking status: Current Every Day Smoker     Packs/day: 1.00     Years: 35.00     Pack years: 35.00     Types: Cigarettes    Smokeless tobacco: Current User     Types: Snuff   Substance and Sexual Activity    Alcohol use: No    Drug use: No    Sexual activity: Never   Lifestyle    Physical activity     Days per week: Not on file     Minutes per session: Not on file    Stress: Not on file   Relationships    Social connections     Talks on phone: Not on file     Gets together: Not on file     Attends Latter day service: Not on file     Active member of club or organization: Not on file     Attends meetings of clubs or organizations: Not on file     Relationship status: Not on file   Other Topics Concern    Not on file   Social History Narrative    Not on file     Family History   Problem Relation Age of Onset    Hodgkin's lymphoma Mother     Kidney failure Father     Diabetes type I Father     Cancer Sister         Allergies  Review of patient's allergies indicates:   Allergen Reactions    Beta-blockers (beta-adrenergic blocking agts) Shortness Of Breath     wheezing    Nsaids (non-steroidal anti-inflammatory drug) Other (See Comments)     hypertention    Statins-hmg-coa reductase inhibitors Other (See Comments)     Heart arrythemias    Pcn [penicillins] Rash       Review of Systems   Review of Systems   Constitution: Negative for malaise/fatigue, weight gain  and weight loss.   Eyes: Negative for visual disturbance.   Cardiovascular: Positive for dyspnea on exertion, leg swelling, orthopnea and paroxysmal nocturnal dyspnea. Negative for chest pain, claudication, cyanosis, irregular heartbeat, near-syncope, palpitations and syncope.   Respiratory: Negative for cough, hemoptysis, shortness of breath, sleep disturbances due to breathing and wheezing.    Hematologic/Lymphatic: Negative for bleeding problem. Does not bruise/bleed easily.   Skin: Negative for poor wound healing.   Musculoskeletal: Negative for muscle cramps and myalgias.   Gastrointestinal: Negative for abdominal pain, anorexia, diarrhea, heartburn, hematemesis, hematochezia, melena, nausea and vomiting.   Genitourinary: Negative for hematuria and nocturia.   Neurological: Negative for excessive daytime sleepiness, dizziness, focal weakness, light-headedness and weakness.       Physical Exam  Vitals:    11/12/20 1512   BP: (!) 148/96     Wt Readings from Last 1 Encounters:   11/12/20 119.6 kg (263 lb 10.7 oz)     Physical Exam   Constitutional: He is oriented to person, place, and time. He is cooperative.  Non-toxic appearance. No distress.   HENT:   Head: Normocephalic and atraumatic.   Eyes: Conjunctivae are normal. No scleral icterus.   Neck: Neck supple. JVD present. No hepatojugular reflux present. Carotid bruit is not present. No tracheal deviation present. No thyromegaly present.   Cardiovascular: Normal rate, regular rhythm, S1 normal and S2 normal.  No extrasystoles are present. PMI is not displaced. Exam reveals gallop and S4. Exam reveals no S3.   No murmur heard.  Pulses:       Carotid pulses are 2+ on the right side and 2+ on the left side.       Radial pulses are 2+ on the right side and 2+ on the left side.        Dorsalis pedis pulses are 2+ on the right side and 2+ on the left side.        Posterior tibial pulses are 2+ on the right side and 2+ on the left side.   Pulmonary/Chest: No accessory  muscle usage. No respiratory distress. He has no decreased breath sounds. He has no wheezes. He has no rhonchi. He has no rales.   Abdominal: Soft. Bowel sounds are normal. He exhibits no pulsatile liver, no abdominal bruit and no pulsatile midline mass. There is no splenomegaly or hepatomegaly. There is no abdominal tenderness.   Musculoskeletal:         General: Edema (2+ bilateral pretibial edema, wearing compression stockings.) present. No tenderness or deformity.   Neurological: He is alert and oriented to person, place, and time. He has normal strength. No cranial nerve deficit or sensory deficit.   Skin: Skin is warm, dry and intact. He is not diaphoretic. No cyanosis. No pallor. Nails show no clubbing.   Psychiatric: He has a normal mood and affect. His speech is normal and behavior is normal.       Labs  Hospital Outpatient Visit on 11/10/2020   Component Date Value Ref Range Status    BSA 11/10/2020 2.44  m2 Final    TDI SEPTAL 11/10/2020 0.06  m/s Final    LV LATERAL E/E' RATIO 11/10/2020 7.86  m/s Final    LV SEPTAL E/E' RATIO 11/10/2020 9.17  m/s Final    LA WIDTH 11/10/2020 3.44  cm Final    TDI LATERAL 11/10/2020 0.07  m/s Final    PV PEAK VELOCITY 11/10/2020 0.88  cm/s Final    LVIDd 11/10/2020 5.30  3.5 - 6.0 cm Final    IVS 11/10/2020 1.18* 0.6 - 1.1 cm Final    Posterior Wall 11/10/2020 1.19* 0.6 - 1.1 cm Final    LVIDs 11/10/2020 4.01* 2.1 - 4.0 cm Final    FS 11/10/2020 24  28 - 44 % Final    LA volume 11/10/2020 60.01  cm3 Final    Sinus 11/10/2020 4.07  cm Final    STJ 11/10/2020 3.95  cm Final    Ascending aorta 11/10/2020 3.72  cm Final    LV mass 11/10/2020 252.15  g Final    LA size 11/10/2020 4.25  cm Final    RVDD 11/10/2020 2.20  cm Final    TAPSE 11/10/2020 1.99  cm Final    RV S' 11/10/2020 12.85  cm/s Final    Left Ventricle Relative Wall Thick* 11/10/2020 0.45  cm Final    AV mean gradient 11/10/2020 2  mmHg Final    AV valve area 11/10/2020 4.29  cm2 Final     AV Velocity Ratio 11/10/2020 0.91   Final    AV index (prosthetic) 11/10/2020 1.02   Final    MV valve area p 1/2 method 11/10/2020 10.72  cm2 Final    E/A ratio 11/10/2020 0.65   Final    Mean e' 11/10/2020 0.07  m/s Final    E wave decelartion time 11/10/2020 70.81  msec Final    IVRT 11/10/2020 85.03  msec Final    Pulm vein S/D ratio 11/10/2020 1.64   Final    LVOT diameter 11/10/2020 2.31  cm Final    LVOT area 11/10/2020 4.2  cm2 Final    LVOT peak ildefonso 11/10/2020 0.86  m/s Final    LVOT peak VTI 11/10/2020 15.91  cm Final    Ao peak ildefonso 11/10/2020 0.94  m/s Final    Ao VTI 11/10/2020 15.54  cm Final    LVOT stroke volume 11/10/2020 66.64  cm3 Final    AV peak gradient 11/10/2020 4  mmHg Final    E/E' ratio 11/10/2020 8.46  m/s Final    MV Peak E Ildefonso 11/10/2020 0.55  m/s Final    MV stenosis pressure 1/2 time 11/10/2020 20.53  ms Final    MV Peak A Ildefonso 11/10/2020 0.85  m/s Final    PV Peak S Ildefonso 11/10/2020 0.54  m/s Final    PV Peak D Ildefonso 11/10/2020 0.33  m/s Final    LV Systolic Volume 11/10/2020 70.22  mL Final    LV Systolic Volume Index 11/10/2020 29.5  mL/m2 Final    LV Diastolic Volume 11/10/2020 135.33  mL Final    LV Diastolic Volume Index 11/10/2020 56.91  mL/m2 Final    LA Volume Index 11/10/2020 25.2  mL/m2 Final    LV Mass Index 11/10/2020 106  g/m2 Final    RA Major Axis 11/10/2020 4.19  cm Final    Left Atrium Minor Axis 11/10/2020 5.01  cm Final    Left Atrium Major Axis 11/10/2020 4.66  cm Final    LA Volume Index (Mod) 11/10/2020 21.0  mL/m2 Final    LA volume (mod) 11/10/2020 50.00  cm3 Final    RA Width 11/10/2020 3.13  cm Final    Right Atrial Pressure (from IVC) 11/10/2020 3  mmHg Final   Lab Visit on 11/10/2020   Component Date Value Ref Range Status    Sodium 11/10/2020 140  136 - 145 mmol/L Final    Potassium 11/10/2020 3.8  3.5 - 5.1 mmol/L Final    Specimen slightly hemolyzed    Chloride 11/10/2020 102  95 - 110 mmol/L Final    CO2 11/10/2020  24  23 - 29 mmol/L Final    Glucose 11/10/2020 231* 70 - 110 mg/dL Final    BUN 11/10/2020 16  8 - 23 mg/dL Final    Creatinine 11/10/2020 1.1  0.5 - 1.4 mg/dL Final    Calcium 11/10/2020 8.8  8.7 - 10.5 mg/dL Final    Total Protein 11/10/2020 6.8  6.0 - 8.4 g/dL Final    Albumin 11/10/2020 3.7  3.5 - 5.2 g/dL Final    Total Bilirubin 11/10/2020 0.5  0.1 - 1.0 mg/dL Final    Comment: For infants and newborns, interpretation of results should be based  on gestational age, weight and in agreement with clinical  observations.  Premature Infant recommended reference ranges:  Up to 24 hours.............<8.0 mg/dL  Up to 48 hours............<12.0 mg/dL  3-5 days..................<15.0 mg/dL  6-29 days.................<15.0 mg/dL      Alkaline Phosphatase 11/10/2020 77  55 - 135 U/L Final    AST 11/10/2020 20  10 - 40 U/L Final    ALT 11/10/2020 44  10 - 44 U/L Final    Anion Gap 11/10/2020 14  8 - 16 mmol/L Final    eGFR if African American 11/10/2020 >60  >60 mL/min/1.73 m^2 Final    eGFR if non African American 11/10/2020 >60  >60 mL/min/1.73 m^2 Final    Comment: Calculation used to obtain the estimated glomerular filtration  rate (eGFR) is the CKD-EPI equation.       WBC 11/10/2020 8.43  3.90 - 12.70 K/uL Final    RBC 11/10/2020 5.77  4.60 - 6.20 M/uL Final    Hemoglobin 11/10/2020 18.2* 14.0 - 18.0 g/dL Final    Hematocrit 11/10/2020 55.5* 40.0 - 54.0 % Final    MCV 11/10/2020 96  82 - 98 fL Final    MCH 11/10/2020 31.5* 27.0 - 31.0 pg Final    MCHC 11/10/2020 32.8  32.0 - 36.0 g/dL Final    RDW 11/10/2020 11.9  11.5 - 14.5 % Final    Platelets 11/10/2020 169  150 - 350 K/uL Final    MPV 11/10/2020 12.5  9.2 - 12.9 fL Final    Immature Granulocytes 11/10/2020 0.6* 0.0 - 0.5 % Final    Gran # (ANC) 11/10/2020 4.3  1.8 - 7.7 K/uL Final    Immature Grans (Abs) 11/10/2020 0.05* 0.00 - 0.04 K/uL Final    Comment: Mild elevation in immature granulocytes is non specific and   can be seen in a  variety of conditions including stress response,   acute inflammation, trauma and pregnancy. Correlation with other   laboratory and clinical findings is essential.      Lymph # 11/10/2020 3.1  1.0 - 4.8 K/uL Final    Mono # 11/10/2020 0.7  0.3 - 1.0 K/uL Final    Eos # 11/10/2020 0.2  0.0 - 0.5 K/uL Final    Baso # 11/10/2020 0.06  0.00 - 0.20 K/uL Final    nRBC 11/10/2020 0  0 /100 WBC Final    Gran % 11/10/2020 50.9  38.0 - 73.0 % Final    Lymph % 11/10/2020 37.1  18.0 - 48.0 % Final    Mono % 11/10/2020 8.3  4.0 - 15.0 % Final    Eosinophil % 11/10/2020 2.4  0.0 - 8.0 % Final    Basophil % 11/10/2020 0.7  0.0 - 1.9 % Final    Differential Method 11/10/2020 Automated   Final    Sodium 11/10/2020 140  136 - 145 mmol/L Final    Potassium 11/10/2020 3.8  3.5 - 5.1 mmol/L Final    Specimen slightly hemolyzed    Chloride 11/10/2020 102  95 - 110 mmol/L Final    CO2 11/10/2020 24  23 - 29 mmol/L Final    Glucose 11/10/2020 231* 70 - 110 mg/dL Final    BUN 11/10/2020 16  8 - 23 mg/dL Final    Creatinine 11/10/2020 1.1  0.5 - 1.4 mg/dL Final    Calcium 11/10/2020 8.8  8.7 - 10.5 mg/dL Final    Total Protein 11/10/2020 6.8  6.0 - 8.4 g/dL Final    Albumin 11/10/2020 3.7  3.5 - 5.2 g/dL Final    Total Bilirubin 11/10/2020 0.5  0.1 - 1.0 mg/dL Final    Comment: For infants and newborns, interpretation of results should be based  on gestational age, weight and in agreement with clinical  observations.  Premature Infant recommended reference ranges:  Up to 24 hours.............<8.0 mg/dL  Up to 48 hours............<12.0 mg/dL  3-5 days..................<15.0 mg/dL  6-29 days.................<15.0 mg/dL      Alkaline Phosphatase 11/10/2020 77  55 - 135 U/L Final    AST 11/10/2020 20  10 - 40 U/L Final    ALT 11/10/2020 44  10 - 44 U/L Final    Anion Gap 11/10/2020 14  8 - 16 mmol/L Final    eGFR if African American 11/10/2020 >60  >60 mL/min/1.73 m^2 Final    eGFR if non African American 11/10/2020  >60  >60 mL/min/1.73 m^2 Final    Comment: Calculation used to obtain the estimated glomerular filtration  rate (eGFR) is the CKD-EPI equation.       WBC 11/10/2020 8.43  3.90 - 12.70 K/uL Final    RBC 11/10/2020 5.77  4.60 - 6.20 M/uL Final    Hemoglobin 11/10/2020 18.2* 14.0 - 18.0 g/dL Final    Hematocrit 11/10/2020 55.5* 40.0 - 54.0 % Final    MCV 11/10/2020 96  82 - 98 fL Final    MCH 11/10/2020 31.5* 27.0 - 31.0 pg Final    MCHC 11/10/2020 32.8  32.0 - 36.0 g/dL Final    RDW 11/10/2020 11.9  11.5 - 14.5 % Final    Platelets 11/10/2020 169  150 - 350 K/uL Final    MPV 11/10/2020 12.5  9.2 - 12.9 fL Final    Immature Granulocytes 11/10/2020 0.6* 0.0 - 0.5 % Final    Gran # (ANC) 11/10/2020 4.3  1.8 - 7.7 K/uL Final    Immature Grans (Abs) 11/10/2020 0.05* 0.00 - 0.04 K/uL Final    Comment: Mild elevation in immature granulocytes is non specific and   can be seen in a variety of conditions including stress response,   acute inflammation, trauma and pregnancy. Correlation with other   laboratory and clinical findings is essential.      Lymph # 11/10/2020 3.1  1.0 - 4.8 K/uL Final    Mono # 11/10/2020 0.7  0.3 - 1.0 K/uL Final    Eos # 11/10/2020 0.2  0.0 - 0.5 K/uL Final    Baso # 11/10/2020 0.06  0.00 - 0.20 K/uL Final    nRBC 11/10/2020 0  0 /100 WBC Final    Gran % 11/10/2020 50.9  38.0 - 73.0 % Final    Lymph % 11/10/2020 37.1  18.0 - 48.0 % Final    Mono % 11/10/2020 8.3  4.0 - 15.0 % Final    Eosinophil % 11/10/2020 2.4  0.0 - 8.0 % Final    Basophil % 11/10/2020 0.7  0.0 - 1.9 % Final    Differential Method 11/10/2020 Automated   Final    BNP 11/10/2020 70  0 - 99 pg/mL Final    Values of less than 100 pg/ml are consistent with non-CHF populations.    Troponin I 11/10/2020 0.024  0.000 - 0.026 ng/mL Final    Comment: The reference interval for Troponin I represents the 99th percentile   cutoff   for our facility and is consistent with 3rd generation assay   performance.       D-Dimer 11/10/2020 0.50* <0.50 mg/L FEU Final    Comment: The quantitative D-dimer assay should be used as an aid in   the diagnosis of deep vein thrombosis and pulmonary embolism  in patients with the appropriate presentation and clinical  history. The upper limit of the reference interval and the clinical   cut off   point are identical. Causes of a positive (>0.50 mg/L FEU) D-Dimer   test  include, but are not limited to: DVT, PE, DIC, thrombolytic   therapy, anticoagulant therapy, recent surgery, trauma, or   pregnancy, disseminated malignancy, aortic aneurysm, cirrhosis,  and severe infection. False negative results may occur in   patients with distal DVT.      CPK 11/10/2020 133  20 - 200 U/L Final    CPK MB 11/10/2020 3.5  0.1 - 6.5 ng/mL Final    MB % 11/10/2020 2.6  0.0 - 5.0 % Final    Comment: To be positive, the MB% must be greater than 5% AND the CK-MB  greater than 6.5 ng/mL. Values not in the reference interval,   but not qualifying as positive, should be considered   &quot;trace&quot;.      Amylase 11/10/2020 38  20 - 110 U/L Final    Magnesium 11/10/2020 1.9  1.6 - 2.6 mg/dL Final    TSH 11/10/2020 1.916  0.400 - 4.000 uIU/mL Final   Lab Visit on 09/22/2020   Component Date Value Ref Range Status    Sodium 09/22/2020 140  136 - 145 mmol/L Final    Potassium 09/22/2020 4.3  3.5 - 5.1 mmol/L Final    Chloride 09/22/2020 107  95 - 110 mmol/L Final    CO2 09/22/2020 24  23 - 29 mmol/L Final    Glucose 09/22/2020 169* 70 - 110 mg/dL Final    BUN 09/22/2020 13  8 - 23 mg/dL Final    Creatinine 09/22/2020 0.9  0.5 - 1.4 mg/dL Final    Calcium 09/22/2020 8.7  8.7 - 10.5 mg/dL Final    Total Protein 09/22/2020 6.7  6.0 - 8.4 g/dL Final    Albumin 09/22/2020 3.7  3.5 - 5.2 g/dL Final    Total Bilirubin 09/22/2020 0.6  0.1 - 1.0 mg/dL Final    Comment: For infants and newborns, interpretation of results should be based  on gestational age, weight and in agreement with  clinical  observations.  Premature Infant recommended reference ranges:  Up to 24 hours.............<8.0 mg/dL  Up to 48 hours............<12.0 mg/dL  3-5 days..................<15.0 mg/dL  6-29 days.................<15.0 mg/dL      Alkaline Phosphatase 09/22/2020 72  55 - 135 U/L Final    AST 09/22/2020 23  10 - 40 U/L Final    ALT 09/22/2020 45* 10 - 44 U/L Final    Anion Gap 09/22/2020 9  8 - 16 mmol/L Final    eGFR if African American 09/22/2020 >60  >60 mL/min/1.73 m^2 Final    eGFR if non African American 09/22/2020 >60  >60 mL/min/1.73 m^2 Final    Comment: Calculation used to obtain the estimated glomerular filtration  rate (eGFR) is the CKD-EPI equation.       CRP 09/22/2020 0.6  0.0 - 8.2 mg/L Final    WBC 09/22/2020 5.68  3.90 - 12.70 K/uL Final    RBC 09/22/2020 5.51  4.60 - 6.20 M/uL Final    Hemoglobin 09/22/2020 18.1* 14.0 - 18.0 g/dL Final    Hematocrit 09/22/2020 54.8* 40.0 - 54.0 % Final    MCV 09/22/2020 100* 82 - 98 fL Final    MCH 09/22/2020 32.8* 27.0 - 31.0 pg Final    MCHC 09/22/2020 33.0  32.0 - 36.0 g/dL Final    RDW 09/22/2020 12.3  11.5 - 14.5 % Final    Platelets 09/22/2020 137* 150 - 350 K/uL Final    MPV 09/22/2020 11.9  9.2 - 12.9 fL Final    Immature Granulocytes 09/22/2020 0.9* 0.0 - 0.5 % Final    Gran # (ANC) 09/22/2020 3.1  1.8 - 7.7 K/uL Final    Immature Grans (Abs) 09/22/2020 0.05* 0.00 - 0.04 K/uL Final    Comment: Mild elevation in immature granulocytes is non specific and   can be seen in a variety of conditions including stress response,   acute inflammation, trauma and pregnancy. Correlation with other   laboratory and clinical findings is essential.      Lymph # 09/22/2020 1.9  1.0 - 4.8 K/uL Final    Mono # 09/22/2020 0.4  0.3 - 1.0 K/uL Final    Eos # 09/22/2020 0.1  0.0 - 0.5 K/uL Final    Baso # 09/22/2020 0.04  0.00 - 0.20 K/uL Final    nRBC 09/22/2020 0  0 /100 WBC Final    Gran % 09/22/2020 55.3  38.0 - 73.0 % Final    Lymph % 09/22/2020  33.8  18.0 - 48.0 % Final    Mono % 09/22/2020 7.2  4.0 - 15.0 % Final    Eosinophil % 09/22/2020 2.1  0.0 - 8.0 % Final    Basophil % 09/22/2020 0.7  0.0 - 1.9 % Final    Differential Method 09/22/2020 Automated   Final    Hemoglobin A1C 09/22/2020 8.8* 4.0 - 5.6 % Final    Comment: ADA Screening Guidelines:  5.7-6.4%  Consistent with prediabetes  >or=6.5%  Consistent with diabetes  High levels of fetal hemoglobin interfere with the HbA1C  assay. Heterozygous hemoglobin variants (HbS, HgC, etc)do  not significantly interfere with this assay.   However, presence of multiple variants may affect accuracy.      Estimated Avg Glucose 09/22/2020 206* 68 - 131 mg/dL Final    Sed Rate 09/22/2020 1  0 - 10 mm/Hr Final    Hep B Viral DNA IU/ML 09/22/2020 <10  <10 IU/mL Final    Log HBV IU/mL 09/22/2020 <1.00  <1.00 Log (10) IU/mL Final    Comment: This procedure utilizes a real-time polymerase chain  reaction test from [employer].  The amplification   target is a conserved region in the terminal third of  the hepatitis B surface antigen gene. The lower limit of   quantitation is 10 IU/mL (1.00 Log IU/mL) and the uppper  limit of quantitation is 1 billion IU/mL (9.00 Log IU/mL).  The qualitative limit of detection is 10 IU/mL   (1.00 Log IU/mL). A  Not detected  result does not rule   out infection.  Test performed at [facility],  [address] , [place], [place]  [id number] [phone]  Demetrius Parikh MD  - Medical Director      Hepatitis B Virus DNA 09/22/2020 Not detected  Not detected Final    Hemoglobin A1C 09/22/2020 8.6* 4.0 - 5.6 % Final    Comment: ADA Screening Guidelines:  5.7-6.4%  Consistent with prediabetes  >or=6.5%  Consistent with diabetes  High levels of fetal hemoglobin interfere with the HbA1C  assay. Heterozygous hemoglobin variants (HbS, HgC, etc)do  not significantly interfere with this assay.   However, presence of multiple variants may affect accuracy.      Estimated  Avg Glucose 09/22/2020 200* 68 - 131 mg/dL Final    TSH 09/22/2020 1.085  0.400 - 4.000 uIU/mL Final    Free T4 09/22/2020 0.98  0.71 - 1.51 ng/dL Final    T3, Free 09/22/2020 2.9  2.3 - 4.2 pg/mL Final    D-Dimer 09/22/2020 0.45  <0.50 mg/L FEU Final    Comment: The quantitative D-dimer assay should be used as an aid in   the diagnosis of deep vein thrombosis and pulmonary embolism  in patients with the appropriate presentation and clinical  history. The upper limit of the reference interval and the clinical   cut off   point are identical. Causes of a positive (>0.50 mg/L FEU) D-Dimer   test  include, but are not limited to: DVT, PE, DIC, thrombolytic   therapy, anticoagulant therapy, recent surgery, trauma, or   pregnancy, disseminated malignancy, aortic aneurysm, cirrhosis,  and severe infection. False negative results may occur in   patients with distal DVT.      BNP 09/22/2020 81  0 - 99 pg/mL Final    Values of less than 100 pg/ml are consistent with non-CHF populations.    Cortisol 09/22/2020 5.30  ug/dL Final    Comment: Cortisol Reference Range:  Before 10:00 am: 4.46-22.7 ug/dL  After 5:00 pm:    1.7-14.1 ug/dL     Lab Visit on 08/14/2020   Component Date Value Ref Range Status    WBC 08/14/2020 7.48  3.90 - 12.70 K/uL Final    RBC 08/14/2020 5.57  4.60 - 6.20 M/uL Final    Hemoglobin 08/14/2020 18.0  14.0 - 18.0 g/dL Final    Hematocrit 08/14/2020 54.8* 40.0 - 54.0 % Final    MCV 08/14/2020 98  82 - 98 fL Final    MCH 08/14/2020 32.3* 27.0 - 31.0 pg Final    MCHC 08/14/2020 32.8  32.0 - 36.0 g/dL Final    RDW 08/14/2020 13.5  11.5 - 14.5 % Final    Platelets 08/14/2020 162  150 - 350 K/uL Final    MPV 08/14/2020 12.3  9.2 - 12.9 fL Final    Immature Granulocytes 08/14/2020 0.5  0.0 - 0.5 % Final    Gran # (ANC) 08/14/2020 5.2  1.8 - 7.7 K/uL Final    Immature Grans (Abs) 08/14/2020 0.04  0.00 - 0.04 K/uL Final    Comment: Mild elevation in immature granulocytes is non specific and    can be seen in a variety of conditions including stress response,   acute inflammation, trauma and pregnancy. Correlation with other   laboratory and clinical findings is essential.      Lymph # 08/14/2020 1.6  1.0 - 4.8 K/uL Final    Mono # 08/14/2020 0.5  0.3 - 1.0 K/uL Final    Eos # 08/14/2020 0.1  0.0 - 0.5 K/uL Final    Baso # 08/14/2020 0.06  0.00 - 0.20 K/uL Final    nRBC 08/14/2020 0  0 /100 WBC Final    Gran % 08/14/2020 68.8  38.0 - 73.0 % Final    Lymph % 08/14/2020 20.9  18.0 - 48.0 % Final    Mono % 08/14/2020 7.1  4.0 - 15.0 % Final    Eosinophil % 08/14/2020 1.9  0.0 - 8.0 % Final    Basophil % 08/14/2020 0.8  0.0 - 1.9 % Final    Differential Method 08/14/2020 Automated   Final    CRP 08/14/2020 0.5  0.0 - 8.2 mg/L Final    Sodium 08/14/2020 140  136 - 145 mmol/L Final    Potassium 08/14/2020 4.1  3.5 - 5.1 mmol/L Final    Chloride 08/14/2020 103  95 - 110 mmol/L Final    CO2 08/14/2020 24  23 - 29 mmol/L Final    Glucose 08/14/2020 225* 70 - 110 mg/dL Final    BUN 08/14/2020 17  8 - 23 mg/dL Final    Creatinine 08/14/2020 1.0  0.5 - 1.4 mg/dL Final    Calcium 08/14/2020 8.7  8.7 - 10.5 mg/dL Final    Total Protein 08/14/2020 6.7  6.0 - 8.4 g/dL Final    Albumin 08/14/2020 3.6  3.5 - 5.2 g/dL Final    Total Bilirubin 08/14/2020 0.4  0.1 - 1.0 mg/dL Final    Comment: For infants and newborns, interpretation of results should be based  on gestational age, weight and in agreement with clinical  observations.  Premature Infant recommended reference ranges:  Up to 24 hours.............<8.0 mg/dL  Up to 48 hours............<12.0 mg/dL  3-5 days..................<15.0 mg/dL  6-29 days.................<15.0 mg/dL      Alkaline Phosphatase 08/14/2020 72  55 - 135 U/L Final    AST 08/14/2020 16  10 - 40 U/L Final    ALT 08/14/2020 36  10 - 44 U/L Final    Anion Gap 08/14/2020 13  8 - 16 mmol/L Final    eGFR if African American 08/14/2020 >60  >60 mL/min/1.73 m^2 Final    eGFR if  non  08/14/2020 >60  >60 mL/min/1.73 m^2 Final    Comment: Calculation used to obtain the estimated glomerular filtration  rate (eGFR) is the CKD-EPI equation.       Sed Rate 08/14/2020 2  0 - 10 mm/Hr Final       Imaging  No results found.    Assessment  1. Acute systolic heart failure  Given his risk factors, need to consider an ischemic etiology.  Seems to be improving with diuresis.  - IN OFFICE EKG 12-LEAD (to [facility])  - Basic Metabolic Panel; Future  - Magnesium; Future    2. Essential hypertension  Needs better control.  Hopefully will improve with diuresis    3. Type 2 diabetes mellitus with other circulatory complication, with long-term current use of insulin  Difficult control    4. Severe obesity  Needs weight loss      Plan and Discussion    Since he is improving on his current diuretic regimen, will continue same.  Will not increase his antihypertensive at this point since losartan was just recently started I suspect his blood pressure will drop some as he diuresis more.  Will plan to see back next week to make sure he is still making progress and check electrolytes and renal function at that time.  Ultimately, he needs an ischemic workup, likely cardiac catheterization.      Follow Up  Follow up in about 1 week (around 11/19/2020).      Shemar Dempsey MD

## 2020-11-16 ENCOUNTER — PATIENT MESSAGE (OUTPATIENT)
Dept: RHEUMATOLOGY | Facility: CLINIC | Age: 65
End: 2020-11-16

## 2020-11-18 ENCOUNTER — LAB VISIT (OUTPATIENT)
Dept: LAB | Facility: OTHER | Age: 65
End: 2020-11-18
Attending: INTERNAL MEDICINE
Payer: MEDICARE

## 2020-11-18 DIAGNOSIS — I50.21 ACUTE SYSTOLIC HEART FAILURE: ICD-10-CM

## 2020-11-18 LAB
ANION GAP SERPL CALC-SCNC: 10 MMOL/L (ref 8–16)
BUN SERPL-MCNC: 15 MG/DL (ref 8–23)
CALCIUM SERPL-MCNC: 9 MG/DL (ref 8.7–10.5)
CHLORIDE SERPL-SCNC: 102 MMOL/L (ref 95–110)
CO2 SERPL-SCNC: 29 MMOL/L (ref 23–29)
CREAT SERPL-MCNC: 1 MG/DL (ref 0.5–1.4)
EST. GFR  (AFRICAN AMERICAN): >60 ML/MIN/1.73 M^2
EST. GFR  (NON AFRICAN AMERICAN): >60 ML/MIN/1.73 M^2
GLUCOSE SERPL-MCNC: 101 MG/DL (ref 70–110)
MAGNESIUM SERPL-MCNC: 2.1 MG/DL (ref 1.6–2.6)
POTASSIUM SERPL-SCNC: 3.8 MMOL/L (ref 3.5–5.1)
SODIUM SERPL-SCNC: 141 MMOL/L (ref 136–145)

## 2020-11-18 PROCEDURE — 83735 ASSAY OF MAGNESIUM: CPT

## 2020-11-18 PROCEDURE — 36415 COLL VENOUS BLD VENIPUNCTURE: CPT

## 2020-11-18 PROCEDURE — 80048 BASIC METABOLIC PNL TOTAL CA: CPT

## 2020-11-19 ENCOUNTER — OFFICE VISIT (OUTPATIENT)
Dept: CARDIOLOGY | Facility: CLINIC | Age: 65
End: 2020-11-19
Payer: MEDICARE

## 2020-11-19 VITALS
DIASTOLIC BLOOD PRESSURE: 84 MMHG | WEIGHT: 263.13 LBS | SYSTOLIC BLOOD PRESSURE: 134 MMHG | HEIGHT: 72 IN | BODY MASS INDEX: 35.64 KG/M2 | HEART RATE: 104 BPM

## 2020-11-19 DIAGNOSIS — E66.01 SEVERE OBESITY: ICD-10-CM

## 2020-11-19 DIAGNOSIS — E11.59 TYPE 2 DIABETES MELLITUS WITH OTHER CIRCULATORY COMPLICATION, WITH LONG-TERM CURRENT USE OF INSULIN: ICD-10-CM

## 2020-11-19 DIAGNOSIS — I50.23 ACUTE ON CHRONIC SYSTOLIC HEART FAILURE: Primary | ICD-10-CM

## 2020-11-19 DIAGNOSIS — I10 ESSENTIAL HYPERTENSION: ICD-10-CM

## 2020-11-19 DIAGNOSIS — Z79.4 TYPE 2 DIABETES MELLITUS WITH OTHER CIRCULATORY COMPLICATION, WITH LONG-TERM CURRENT USE OF INSULIN: ICD-10-CM

## 2020-11-19 PROCEDURE — 99999 PR PBB SHADOW E&M-EST. PATIENT-LVL IV: ICD-10-PCS | Mod: PBBFAC,,, | Performed by: INTERNAL MEDICINE

## 2020-11-19 PROCEDURE — 99215 OFFICE O/P EST HI 40 MIN: CPT | Mod: S$PBB,,, | Performed by: INTERNAL MEDICINE

## 2020-11-19 PROCEDURE — 99214 OFFICE O/P EST MOD 30 MIN: CPT | Mod: PBBFAC | Performed by: INTERNAL MEDICINE

## 2020-11-19 PROCEDURE — 99999 PR PBB SHADOW E&M-EST. PATIENT-LVL IV: CPT | Mod: PBBFAC,,, | Performed by: INTERNAL MEDICINE

## 2020-11-19 PROCEDURE — 99215 PR OFFICE/OUTPT VISIT, EST, LEVL V, 40-54 MIN: ICD-10-PCS | Mod: S$PBB,,, | Performed by: INTERNAL MEDICINE

## 2020-11-19 RX ORDER — CARVEDILOL 3.12 MG/1
3.12 TABLET ORAL 2 TIMES DAILY WITH MEALS
Qty: 180 TABLET | Refills: 3 | Status: SHIPPED | OUTPATIENT
Start: 2020-11-19 | End: 2021-03-08 | Stop reason: CLARIF

## 2020-11-19 NOTE — PROGRESS NOTES
"[facility]    Chief Complaint  Chief Complaint   Patient presents with    Congestive Heart Failure       HPI:    Better but not back to baseline.  Less exertional dyspnea.  His initial good response to furosemide has tapered off and urine is looking more concentrated.    Medications  Current Outpatient Medications   Medication Sig Dispense Refill    ACCU-CHEK MULTICLIX LANCET lancets   5    aspirin 81 MG Chew Take 81 mg by mouth.      BD ULTRA-FINE RAHUL PEN NEEDLES 32 x 5/32 " Ndle   9    escitalopram oxalate (LEXAPRO) 20 MG tablet   4    folic acid (FOLVITE) 1 MG tablet   4    furosemide (LASIX) 40 MG tablet Take 40 mg by mouth 2 (two) times a day.       hydrocodone-acetaminophen 10-325mg (NORCO)  mg Tab   0    lamiVUDine (EPIVIR) 150 MG Tab TAKE 1 TABLET(150 MG) BY MOUTH EVERY DAY 30 tablet 6    losartan (COZAAR) 100 MG tablet 100 mg once daily.      NOVOLOG FLEXPEN 100 unit/mL InPn pen 50 Units 3 (three) times daily with meals.   1    potassium chloride (MICRO-K) 10 MEQ CpSR   4    PROAIR HFA 90 mcg/actuation inhaler       TRESIBA FLEXTOUCH U-200 200 unit/mL (3 mL) InPn 50 Units 2 (two) times daily.       entanercept (ENBREL) 50 mg/mL injection Inject 1 mL (50 mg total) into the skin every 7 days. (Patient not taking: Reported on 11/19/2020) 4 mL 11    lorazepam (ATIVAN) 1 MG tablet   1    OXYGEN-AIR DELIVERY SYSTEMS MISC by Misc.(Non-Drug; Combo Route) route.       No current facility-administered medications for this visit.         History  Past Medical History:   Diagnosis Date    Arthritis     Diabetes mellitus     Hyperlipidemia     Hypertension      Past Surgical History:   Procedure Laterality Date    PLEURA BIOPSY      RESECTION OF ATRIAL MYXOMA  2007     Social History     Socioeconomic History    Marital status: Single     Spouse name: Not on file    Number of children: Not on file    Years of education: Not on file    Highest education level: Not on file "   Occupational History    Not on file   Social Needs    Financial resource strain: Not on file    Food insecurity     Worry: Not on file     Inability: Not on file    Transportation needs     Medical: Not on file     Non-medical: Not on file   Tobacco Use    Smoking status: Current Every Day Smoker     Packs/day: 1.00     Years: 35.00     Pack years: 35.00     Types: Cigarettes    Smokeless tobacco: Current User     Types: Snuff   Substance and Sexual Activity    Alcohol use: No    Drug use: No    Sexual activity: Never   Lifestyle    Physical activity     Days per week: Not on file     Minutes per session: Not on file    Stress: Not on file   Relationships    Social connections     Talks on phone: Not on file     Gets together: Not on file     Attends Voodoo service: Not on file     Active member of club or organization: Not on file     Attends meetings of clubs or organizations: Not on file     Relationship status: Not on file   Other Topics Concern    Not on file   Social History Narrative    Not on file     Family History   Problem Relation Age of Onset    Hodgkin's lymphoma Mother     Kidney failure Father     Diabetes type I Father     Cancer Sister         Allergies  Review of patient's allergies indicates:   Allergen Reactions    Beta-blockers (beta-adrenergic blocking agts) Shortness Of Breath     wheezing    Nsaids (non-steroidal anti-inflammatory drug) Other (See Comments)     hypertention    Statins-hmg-coa reductase inhibitors Other (See Comments)     Heart arrythemias    Pcn [penicillins] Rash       Review of Systems   Review of Systems   Constitution: Negative for malaise/fatigue, weight gain and weight loss.   Eyes: Negative for visual disturbance.   Cardiovascular: Positive for dyspnea on exertion and leg swelling. Negative for chest pain, claudication, cyanosis, irregular heartbeat, near-syncope, orthopnea, palpitations, paroxysmal nocturnal dyspnea and syncope.    Respiratory: Negative for cough, hemoptysis, shortness of breath, sleep disturbances due to breathing and wheezing.    Hematologic/Lymphatic: Negative for bleeding problem. Does not bruise/bleed easily.   Skin: Negative for poor wound healing.   Musculoskeletal: Negative for muscle cramps and myalgias.   Gastrointestinal: Negative for abdominal pain, anorexia, diarrhea, heartburn, hematemesis, hematochezia, melena, nausea and vomiting.   Genitourinary: Negative for hematuria and nocturia.   Neurological: Negative for excessive daytime sleepiness, dizziness, focal weakness, light-headedness and weakness.       Physical Exam  Vitals:    11/19/20 1455   BP: 134/84   Pulse: 104     Wt Readings from Last 1 Encounters:   11/19/20 119.3 kg (263 lb 1.9 oz)     Physical Exam   Constitutional: He is oriented to person, place, and time. He is cooperative.  Non-toxic appearance. No distress.   HENT:   Head: Normocephalic and atraumatic.   Eyes: Conjunctivae are normal. No scleral icterus.   Neck: Neck supple. No hepatojugular reflux and no JVD present. Carotid bruit is not present. No tracheal deviation present. No thyromegaly present.   Cardiovascular: Normal rate, regular rhythm, S1 normal and S2 normal.  No extrasystoles are present. PMI is not displaced. Exam reveals gallop and S4. Exam reveals no S3.   No murmur heard.  Pulses:       Carotid pulses are 2+ on the right side and 2+ on the left side.       Radial pulses are 2+ on the right side and 2+ on the left side.        Dorsalis pedis pulses are 2+ on the right side and 2+ on the left side.        Posterior tibial pulses are 2+ on the right side and 2+ on the left side.   Pulmonary/Chest: No accessory muscle usage. No respiratory distress. He has no decreased breath sounds. He has no wheezes. He has no rhonchi. He has no rales.   Abdominal: Soft. Bowel sounds are normal. He exhibits no pulsatile liver, no abdominal bruit and no pulsatile midline mass. There is no  splenomegaly or hepatomegaly. There is no abdominal tenderness.   Musculoskeletal:         General: Edema (2+ bilateral pretibial edema, wearing compression stockings.) present. No tenderness or deformity.   Neurological: He is alert and oriented to person, place, and time. He has normal strength. No cranial nerve deficit or sensory deficit.   Skin: Skin is warm, dry and intact. He is not diaphoretic. No cyanosis. No pallor. Nails show no clubbing.   Psychiatric: He has a normal mood and affect. His speech is normal and behavior is normal.       Labs  Lab Visit on 11/18/2020   Component Date Value Ref Range Status    Sodium 11/18/2020 141  136 - 145 mmol/L Final    Potassium 11/18/2020 3.8  3.5 - 5.1 mmol/L Final    Chloride 11/18/2020 102  95 - 110 mmol/L Final    CO2 11/18/2020 29  23 - 29 mmol/L Final    Glucose 11/18/2020 101  70 - 110 mg/dL Final    BUN 11/18/2020 15  8 - 23 mg/dL Final    Creatinine 11/18/2020 1.0  0.5 - 1.4 mg/dL Final    Calcium 11/18/2020 9.0  8.7 - 10.5 mg/dL Final    Anion Gap 11/18/2020 10  8 - 16 mmol/L Final    eGFR if African American 11/18/2020 >60  >60 mL/min/1.73 m^2 Final    eGFR if non African American 11/18/2020 >60  >60 mL/min/1.73 m^2 Final    Comment: Calculation used to obtain the estimated glomerular filtration  rate (eGFR) is the CKD-EPI equation.       Magnesium 11/18/2020 2.1  1.6 - 2.6 mg/dL Final   Hospital Outpatient Visit on 11/10/2020   Component Date Value Ref Range Status    BSA 11/10/2020 2.44  m2 Final    TDI SEPTAL 11/10/2020 0.06  m/s Final    LV LATERAL E/E' RATIO 11/10/2020 7.86  m/s Final    LV SEPTAL E/E' RATIO 11/10/2020 9.17  m/s Final    LA WIDTH 11/10/2020 3.44  cm Final    TDI LATERAL 11/10/2020 0.07  m/s Final    PV PEAK VELOCITY 11/10/2020 0.88  cm/s Final    LVIDd 11/10/2020 5.30  3.5 - 6.0 cm Final    IVS 11/10/2020 1.18* 0.6 - 1.1 cm Final    Posterior Wall 11/10/2020 1.19* 0.6 - 1.1 cm Final    LVIDs 11/10/2020 4.01* 2.1 -  4.0 cm Final    FS 11/10/2020 24  28 - 44 % Final    LA volume 11/10/2020 60.01  cm3 Final    Sinus 11/10/2020 4.07  cm Final    STJ 11/10/2020 3.95  cm Final    Ascending aorta 11/10/2020 3.72  cm Final    LV mass 11/10/2020 252.15  g Final    LA size 11/10/2020 4.25  cm Final    RVDD 11/10/2020 2.20  cm Final    TAPSE 11/10/2020 1.99  cm Final    RV S' 11/10/2020 12.85  cm/s Final    Left Ventricle Relative Wall Thick* 11/10/2020 0.45  cm Final    AV mean gradient 11/10/2020 2  mmHg Final    AV valve area 11/10/2020 4.29  cm2 Final    AV Velocity Ratio 11/10/2020 0.91   Final    AV index (prosthetic) 11/10/2020 1.02   Final    MV valve area p 1/2 method 11/10/2020 10.72  cm2 Final    E/A ratio 11/10/2020 0.65   Final    Mean e' 11/10/2020 0.07  m/s Final    E wave decelartion time 11/10/2020 70.81  msec Final    IVRT 11/10/2020 85.03  msec Final    Pulm vein S/D ratio 11/10/2020 1.64   Final    LVOT diameter 11/10/2020 2.31  cm Final    LVOT area 11/10/2020 4.2  cm2 Final    LVOT peak ildefonso 11/10/2020 0.86  m/s Final    LVOT peak VTI 11/10/2020 15.91  cm Final    Ao peak ildefonso 11/10/2020 0.94  m/s Final    Ao VTI 11/10/2020 15.54  cm Final    LVOT stroke volume 11/10/2020 66.64  cm3 Final    AV peak gradient 11/10/2020 4  mmHg Final    E/E' ratio 11/10/2020 8.46  m/s Final    MV Peak E Ildefonso 11/10/2020 0.55  m/s Final    MV stenosis pressure 1/2 time 11/10/2020 20.53  ms Final    MV Peak A Ildefonso 11/10/2020 0.85  m/s Final    PV Peak S Ildefonso 11/10/2020 0.54  m/s Final    PV Peak D Ildefonso 11/10/2020 0.33  m/s Final    LV Systolic Volume 11/10/2020 70.22  mL Final    LV Systolic Volume Index 11/10/2020 29.5  mL/m2 Final    LV Diastolic Volume 11/10/2020 135.33  mL Final    LV Diastolic Volume Index 11/10/2020 56.91  mL/m2 Final    LA Volume Index 11/10/2020 25.2  mL/m2 Final    LV Mass Index 11/10/2020 106  g/m2 Final    RA Major Axis 11/10/2020 4.19  cm Final    Left Atrium Minor Axis  11/10/2020 5.01  cm Final    Left Atrium Major Axis 11/10/2020 4.66  cm Final    LA Volume Index (Mod) 11/10/2020 21.0  mL/m2 Final    LA volume (mod) 11/10/2020 50.00  cm3 Final    RA Width 11/10/2020 3.13  cm Final    Right Atrial Pressure (from IVC) 11/10/2020 3  mmHg Final   Lab Visit on 11/10/2020   Component Date Value Ref Range Status    Sodium 11/10/2020 140  136 - 145 mmol/L Final    Potassium 11/10/2020 3.8  3.5 - 5.1 mmol/L Final    Specimen slightly hemolyzed    Chloride 11/10/2020 102  95 - 110 mmol/L Final    CO2 11/10/2020 24  23 - 29 mmol/L Final    Glucose 11/10/2020 231* 70 - 110 mg/dL Final    BUN 11/10/2020 16  8 - 23 mg/dL Final    Creatinine 11/10/2020 1.1  0.5 - 1.4 mg/dL Final    Calcium 11/10/2020 8.8  8.7 - 10.5 mg/dL Final    Total Protein 11/10/2020 6.8  6.0 - 8.4 g/dL Final    Albumin 11/10/2020 3.7  3.5 - 5.2 g/dL Final    Total Bilirubin 11/10/2020 0.5  0.1 - 1.0 mg/dL Final    Comment: For infants and newborns, interpretation of results should be based  on gestational age, weight and in agreement with clinical  observations.  Premature Infant recommended reference ranges:  Up to 24 hours.............<8.0 mg/dL  Up to 48 hours............<12.0 mg/dL  3-5 days..................<15.0 mg/dL  6-29 days.................<15.0 mg/dL      Alkaline Phosphatase 11/10/2020 77  55 - 135 U/L Final    AST 11/10/2020 20  10 - 40 U/L Final    ALT 11/10/2020 44  10 - 44 U/L Final    Anion Gap 11/10/2020 14  8 - 16 mmol/L Final    eGFR if African American 11/10/2020 >60  >60 mL/min/1.73 m^2 Final    eGFR if non African American 11/10/2020 >60  >60 mL/min/1.73 m^2 Final    Comment: Calculation used to obtain the estimated glomerular filtration  rate (eGFR) is the CKD-EPI equation.       WBC 11/10/2020 8.43  3.90 - 12.70 K/uL Final    RBC 11/10/2020 5.77  4.60 - 6.20 M/uL Final    Hemoglobin 11/10/2020 18.2* 14.0 - 18.0 g/dL Final    Hematocrit 11/10/2020 55.5* 40.0 - 54.0 %  Final    MCV 11/10/2020 96  82 - 98 fL Final    MCH 11/10/2020 31.5* 27.0 - 31.0 pg Final    MCHC 11/10/2020 32.8  32.0 - 36.0 g/dL Final    RDW 11/10/2020 11.9  11.5 - 14.5 % Final    Platelets 11/10/2020 169  150 - 350 K/uL Final    MPV 11/10/2020 12.5  9.2 - 12.9 fL Final    Immature Granulocytes 11/10/2020 0.6* 0.0 - 0.5 % Final    Gran # (ANC) 11/10/2020 4.3  1.8 - 7.7 K/uL Final    Immature Grans (Abs) 11/10/2020 0.05* 0.00 - 0.04 K/uL Final    Comment: Mild elevation in immature granulocytes is non specific and   can be seen in a variety of conditions including stress response,   acute inflammation, trauma and pregnancy. Correlation with other   laboratory and clinical findings is essential.      Lymph # 11/10/2020 3.1  1.0 - 4.8 K/uL Final    Mono # 11/10/2020 0.7  0.3 - 1.0 K/uL Final    Eos # 11/10/2020 0.2  0.0 - 0.5 K/uL Final    Baso # 11/10/2020 0.06  0.00 - 0.20 K/uL Final    nRBC 11/10/2020 0  0 /100 WBC Final    Gran % 11/10/2020 50.9  38.0 - 73.0 % Final    Lymph % 11/10/2020 37.1  18.0 - 48.0 % Final    Mono % 11/10/2020 8.3  4.0 - 15.0 % Final    Eosinophil % 11/10/2020 2.4  0.0 - 8.0 % Final    Basophil % 11/10/2020 0.7  0.0 - 1.9 % Final    Differential Method 11/10/2020 Automated   Final    Sodium 11/10/2020 140  136 - 145 mmol/L Final    Potassium 11/10/2020 3.8  3.5 - 5.1 mmol/L Final    Specimen slightly hemolyzed    Chloride 11/10/2020 102  95 - 110 mmol/L Final    CO2 11/10/2020 24  23 - 29 mmol/L Final    Glucose 11/10/2020 231* 70 - 110 mg/dL Final    BUN 11/10/2020 16  8 - 23 mg/dL Final    Creatinine 11/10/2020 1.1  0.5 - 1.4 mg/dL Final    Calcium 11/10/2020 8.8  8.7 - 10.5 mg/dL Final    Total Protein 11/10/2020 6.8  6.0 - 8.4 g/dL Final    Albumin 11/10/2020 3.7  3.5 - 5.2 g/dL Final    Total Bilirubin 11/10/2020 0.5  0.1 - 1.0 mg/dL Final    Comment: For infants and newborns, interpretation of results should be based  on gestational age, weight and  in agreement with clinical  observations.  Premature Infant recommended reference ranges:  Up to 24 hours.............<8.0 mg/dL  Up to 48 hours............<12.0 mg/dL  3-5 days..................<15.0 mg/dL  6-29 days.................<15.0 mg/dL      Alkaline Phosphatase 11/10/2020 77  55 - 135 U/L Final    AST 11/10/2020 20  10 - 40 U/L Final    ALT 11/10/2020 44  10 - 44 U/L Final    Anion Gap 11/10/2020 14  8 - 16 mmol/L Final    eGFR if African American 11/10/2020 >60  >60 mL/min/1.73 m^2 Final    eGFR if non African American 11/10/2020 >60  >60 mL/min/1.73 m^2 Final    Comment: Calculation used to obtain the estimated glomerular filtration  rate (eGFR) is the CKD-EPI equation.       WBC 11/10/2020 8.43  3.90 - 12.70 K/uL Final    RBC 11/10/2020 5.77  4.60 - 6.20 M/uL Final    Hemoglobin 11/10/2020 18.2* 14.0 - 18.0 g/dL Final    Hematocrit 11/10/2020 55.5* 40.0 - 54.0 % Final    MCV 11/10/2020 96  82 - 98 fL Final    MCH 11/10/2020 31.5* 27.0 - 31.0 pg Final    MCHC 11/10/2020 32.8  32.0 - 36.0 g/dL Final    RDW 11/10/2020 11.9  11.5 - 14.5 % Final    Platelets 11/10/2020 169  150 - 350 K/uL Final    MPV 11/10/2020 12.5  9.2 - 12.9 fL Final    Immature Granulocytes 11/10/2020 0.6* 0.0 - 0.5 % Final    Gran # (ANC) 11/10/2020 4.3  1.8 - 7.7 K/uL Final    Immature Grans (Abs) 11/10/2020 0.05* 0.00 - 0.04 K/uL Final    Comment: Mild elevation in immature granulocytes is non specific and   can be seen in a variety of conditions including stress response,   acute inflammation, trauma and pregnancy. Correlation with other   laboratory and clinical findings is essential.      Lymph # 11/10/2020 3.1  1.0 - 4.8 K/uL Final    Mono # 11/10/2020 0.7  0.3 - 1.0 K/uL Final    Eos # 11/10/2020 0.2  0.0 - 0.5 K/uL Final    Baso # 11/10/2020 0.06  0.00 - 0.20 K/uL Final    nRBC 11/10/2020 0  0 /100 WBC Final    Gran % 11/10/2020 50.9  38.0 - 73.0 % Final    Lymph % 11/10/2020 37.1  18.0 - 48.0 % Final     Mono % 11/10/2020 8.3  4.0 - 15.0 % Final    Eosinophil % 11/10/2020 2.4  0.0 - 8.0 % Final    Basophil % 11/10/2020 0.7  0.0 - 1.9 % Final    Differential Method 11/10/2020 Automated   Final    BNP 11/10/2020 70  0 - 99 pg/mL Final    Values of less than 100 pg/ml are consistent with non-CHF populations.    Troponin I 11/10/2020 0.024  0.000 - 0.026 ng/mL Final    Comment: The reference interval for Troponin I represents the 99th percentile   cutoff   for our facility and is consistent with 3rd generation assay   performance.      D-Dimer 11/10/2020 0.50* <0.50 mg/L FEU Final    Comment: The quantitative D-dimer assay should be used as an aid in   the diagnosis of deep vein thrombosis and pulmonary embolism  in patients with the appropriate presentation and clinical  history. The upper limit of the reference interval and the clinical   cut off   point are identical. Causes of a positive (>0.50 mg/L FEU) D-Dimer   test  include, but are not limited to: DVT, PE, DIC, thrombolytic   therapy, anticoagulant therapy, recent surgery, trauma, or   pregnancy, disseminated malignancy, aortic aneurysm, cirrhosis,  and severe infection. False negative results may occur in   patients with distal DVT.      CPK 11/10/2020 133  20 - 200 U/L Final    CPK MB 11/10/2020 3.5  0.1 - 6.5 ng/mL Final    MB % 11/10/2020 2.6  0.0 - 5.0 % Final    Comment: To be positive, the MB% must be greater than 5% AND the CK-MB  greater than 6.5 ng/mL. Values not in the reference interval,   but not qualifying as positive, should be considered   &quot;trace&quot;.      Amylase 11/10/2020 38  20 - 110 U/L Final    Magnesium 11/10/2020 1.9  1.6 - 2.6 mg/dL Final    TSH 11/10/2020 1.916  0.400 - 4.000 uIU/mL Final   Lab Visit on 09/22/2020   Component Date Value Ref Range Status    Sodium 09/22/2020 140  136 - 145 mmol/L Final    Potassium 09/22/2020 4.3  3.5 - 5.1 mmol/L Final    Chloride 09/22/2020 107  95 - 110 mmol/L Final    CO2  09/22/2020 24  23 - 29 mmol/L Final    Glucose 09/22/2020 169* 70 - 110 mg/dL Final    BUN 09/22/2020 13  8 - 23 mg/dL Final    Creatinine 09/22/2020 0.9  0.5 - 1.4 mg/dL Final    Calcium 09/22/2020 8.7  8.7 - 10.5 mg/dL Final    Total Protein 09/22/2020 6.7  6.0 - 8.4 g/dL Final    Albumin 09/22/2020 3.7  3.5 - 5.2 g/dL Final    Total Bilirubin 09/22/2020 0.6  0.1 - 1.0 mg/dL Final    Comment: For infants and newborns, interpretation of results should be based  on gestational age, weight and in agreement with clinical  observations.  Premature Infant recommended reference ranges:  Up to 24 hours.............<8.0 mg/dL  Up to 48 hours............<12.0 mg/dL  3-5 days..................<15.0 mg/dL  6-29 days.................<15.0 mg/dL      Alkaline Phosphatase 09/22/2020 72  55 - 135 U/L Final    AST 09/22/2020 23  10 - 40 U/L Final    ALT 09/22/2020 45* 10 - 44 U/L Final    Anion Gap 09/22/2020 9  8 - 16 mmol/L Final    eGFR if African American 09/22/2020 >60  >60 mL/min/1.73 m^2 Final    eGFR if non African American 09/22/2020 >60  >60 mL/min/1.73 m^2 Final    Comment: Calculation used to obtain the estimated glomerular filtration  rate (eGFR) is the CKD-EPI equation.       CRP 09/22/2020 0.6  0.0 - 8.2 mg/L Final    WBC 09/22/2020 5.68  3.90 - 12.70 K/uL Final    RBC 09/22/2020 5.51  4.60 - 6.20 M/uL Final    Hemoglobin 09/22/2020 18.1* 14.0 - 18.0 g/dL Final    Hematocrit 09/22/2020 54.8* 40.0 - 54.0 % Final    MCV 09/22/2020 100* 82 - 98 fL Final    MCH 09/22/2020 32.8* 27.0 - 31.0 pg Final    MCHC 09/22/2020 33.0  32.0 - 36.0 g/dL Final    RDW 09/22/2020 12.3  11.5 - 14.5 % Final    Platelets 09/22/2020 137* 150 - 350 K/uL Final    MPV 09/22/2020 11.9  9.2 - 12.9 fL Final    Immature Granulocytes 09/22/2020 0.9* 0.0 - 0.5 % Final    Gran # (ANC) 09/22/2020 3.1  1.8 - 7.7 K/uL Final    Immature Grans (Abs) 09/22/2020 0.05* 0.00 - 0.04 K/uL Final    Comment: Mild elevation in immature  granulocytes is non specific and   can be seen in a variety of conditions including stress response,   acute inflammation, trauma and pregnancy. Correlation with other   laboratory and clinical findings is essential.      Lymph # 09/22/2020 1.9  1.0 - 4.8 K/uL Final    Mono # 09/22/2020 0.4  0.3 - 1.0 K/uL Final    Eos # 09/22/2020 0.1  0.0 - 0.5 K/uL Final    Baso # 09/22/2020 0.04  0.00 - 0.20 K/uL Final    nRBC 09/22/2020 0  0 /100 WBC Final    Gran % 09/22/2020 55.3  38.0 - 73.0 % Final    Lymph % 09/22/2020 33.8  18.0 - 48.0 % Final    Mono % 09/22/2020 7.2  4.0 - 15.0 % Final    Eosinophil % 09/22/2020 2.1  0.0 - 8.0 % Final    Basophil % 09/22/2020 0.7  0.0 - 1.9 % Final    Differential Method 09/22/2020 Automated   Final    Hemoglobin A1C 09/22/2020 8.8* 4.0 - 5.6 % Final    Comment: ADA Screening Guidelines:  5.7-6.4%  Consistent with prediabetes  >or=6.5%  Consistent with diabetes  High levels of fetal hemoglobin interfere with the HbA1C  assay. Heterozygous hemoglobin variants (HbS, HgC, etc)do  not significantly interfere with this assay.   However, presence of multiple variants may affect accuracy.      Estimated Avg Glucose 09/22/2020 206* 68 - 131 mg/dL Final    Sed Rate 09/22/2020 1  0 - 10 mm/Hr Final    Hep B Viral DNA IU/ML 09/22/2020 <10  <10 IU/mL Final    Log HBV IU/mL 09/22/2020 <1.00  <1.00 Log (10) IU/mL Final    Comment: This procedure utilizes a real-time polymerase chain  reaction test from [employer].  The amplification   target is a conserved region in the terminal third of  the hepatitis B surface antigen gene. The lower limit of   quantitation is 10 IU/mL (1.00 Log IU/mL) and the uppper  limit of quantitation is 1 billion IU/mL (9.00 Log IU/mL).  The qualitative limit of detection is 10 IU/mL   (1.00 Log IU/mL). A  Not detected  result does not rule   out infection.  Test performed at [facility],  [address], [place]  [address]      [phone]  Demetrius Parikh MD  - Medical Director      Hepatitis B Virus DNA 09/22/2020 Not detected  Not detected Final    Hemoglobin A1C 09/22/2020 8.6* 4.0 - 5.6 % Final    Comment: ADA Screening Guidelines:  5.7-6.4%  Consistent with prediabetes  >or=6.5%  Consistent with diabetes  High levels of fetal hemoglobin interfere with the HbA1C  assay. Heterozygous hemoglobin variants (HbS, HgC, etc)do  not significantly interfere with this assay.   However, presence of multiple variants may affect accuracy.      Estimated Avg Glucose 09/22/2020 200* 68 - 131 mg/dL Final    TSH 09/22/2020 1.085  0.400 - 4.000 uIU/mL Final    Free T4 09/22/2020 0.98  0.71 - 1.51 ng/dL Final    T3, Free 09/22/2020 2.9  2.3 - 4.2 pg/mL Final    D-Dimer 09/22/2020 0.45  <0.50 mg/L FEU Final    Comment: The quantitative D-dimer assay should be used as an aid in   the diagnosis of deep vein thrombosis and pulmonary embolism  in patients with the appropriate presentation and clinical  history. The upper limit of the reference interval and the clinical   cut off   point are identical. Causes of a positive (>0.50 mg/L FEU) D-Dimer   test  include, but are not limited to: DVT, PE, DIC, thrombolytic   therapy, anticoagulant therapy, recent surgery, trauma, or   pregnancy, disseminated malignancy, aortic aneurysm, cirrhosis,  and severe infection. False negative results may occur in   patients with distal DVT.      BNP 09/22/2020 81  0 - 99 pg/mL Final    Values of less than 100 pg/ml are consistent with non-CHF populations.    Cortisol 09/22/2020 5.30  ug/dL Final    Comment: Cortisol Reference Range:  Before 10:00 am: 4.46-22.7 ug/dL  After 5:00 pm:    1.7-14.1 ug/dL     Lab Visit on 08/14/2020   Component Date Value Ref Range Status    WBC 08/14/2020 7.48  3.90 - 12.70 K/uL Final    RBC 08/14/2020 5.57  4.60 - 6.20 M/uL Final    Hemoglobin 08/14/2020 18.0  14.0 - 18.0 g/dL Final    Hematocrit 08/14/2020 54.8* 40.0 - 54.0 %  Final    MCV 08/14/2020 98  82 - 98 fL Final    MCH 08/14/2020 32.3* 27.0 - 31.0 pg Final    MCHC 08/14/2020 32.8  32.0 - 36.0 g/dL Final    RDW 08/14/2020 13.5  11.5 - 14.5 % Final    Platelets 08/14/2020 162  150 - 350 K/uL Final    MPV 08/14/2020 12.3  9.2 - 12.9 fL Final    Immature Granulocytes 08/14/2020 0.5  0.0 - 0.5 % Final    Gran # (ANC) 08/14/2020 5.2  1.8 - 7.7 K/uL Final    Immature Grans (Abs) 08/14/2020 0.04  0.00 - 0.04 K/uL Final    Comment: Mild elevation in immature granulocytes is non specific and   can be seen in a variety of conditions including stress response,   acute inflammation, trauma and pregnancy. Correlation with other   laboratory and clinical findings is essential.      Lymph # 08/14/2020 1.6  1.0 - 4.8 K/uL Final    Mono # 08/14/2020 0.5  0.3 - 1.0 K/uL Final    Eos # 08/14/2020 0.1  0.0 - 0.5 K/uL Final    Baso # 08/14/2020 0.06  0.00 - 0.20 K/uL Final    nRBC 08/14/2020 0  0 /100 WBC Final    Gran % 08/14/2020 68.8  38.0 - 73.0 % Final    Lymph % 08/14/2020 20.9  18.0 - 48.0 % Final    Mono % 08/14/2020 7.1  4.0 - 15.0 % Final    Eosinophil % 08/14/2020 1.9  0.0 - 8.0 % Final    Basophil % 08/14/2020 0.8  0.0 - 1.9 % Final    Differential Method 08/14/2020 Automated   Final    CRP 08/14/2020 0.5  0.0 - 8.2 mg/L Final    Sodium 08/14/2020 140  136 - 145 mmol/L Final    Potassium 08/14/2020 4.1  3.5 - 5.1 mmol/L Final    Chloride 08/14/2020 103  95 - 110 mmol/L Final    CO2 08/14/2020 24  23 - 29 mmol/L Final    Glucose 08/14/2020 225* 70 - 110 mg/dL Final    BUN 08/14/2020 17  8 - 23 mg/dL Final    Creatinine 08/14/2020 1.0  0.5 - 1.4 mg/dL Final    Calcium 08/14/2020 8.7  8.7 - 10.5 mg/dL Final    Total Protein 08/14/2020 6.7  6.0 - 8.4 g/dL Final    Albumin 08/14/2020 3.6  3.5 - 5.2 g/dL Final    Total Bilirubin 08/14/2020 0.4  0.1 - 1.0 mg/dL Final    Comment: For infants and newborns, interpretation of results should be based  on gestational  age, weight and in agreement with clinical  observations.  Premature Infant recommended reference ranges:  Up to 24 hours.............<8.0 mg/dL  Up to 48 hours............<12.0 mg/dL  3-5 days..................<15.0 mg/dL  6-29 days.................<15.0 mg/dL      Alkaline Phosphatase 08/14/2020 72  55 - 135 U/L Final    AST 08/14/2020 16  10 - 40 U/L Final    ALT 08/14/2020 36  10 - 44 U/L Final    Anion Gap 08/14/2020 13  8 - 16 mmol/L Final    eGFR if African American 08/14/2020 >60  >60 mL/min/1.73 m^2 Final    eGFR if non African American 08/14/2020 >60  >60 mL/min/1.73 m^2 Final    Comment: Calculation used to obtain the estimated glomerular filtration  rate (eGFR) is the CKD-EPI equation.       Sed Rate 08/14/2020 2  0 - 10 mm/Hr Final       Imaging  No results found.    Assessment  1. Acute on chronic systolic heart failure  Improved with diuresis  - Comprehensive Metabolic Panel; Future  - Magnesium; Future  - BNP; Future  - X-Ray Chest PA And Lateral; Future    2. Essential hypertension  Better control    3. Severe obesity  Working on weight loss    4. Type 2 diabetes mellitus with other circulatory complication, with long-term current use of insulin  For primary      Plan and Discussion    Difficult to say how much of his residual exertional dyspnea is due to his chronic lung disease.  On exam his volume status is certainly better.  Will plan to proceed with angiography.  He would like to wait and discuss at the next visit.     Follow Up  Follow up in about 2 weeks (around 12/3/2020).      Shemar Dempsey MD

## 2020-11-23 ENCOUNTER — PATIENT MESSAGE (OUTPATIENT)
Dept: RHEUMATOLOGY | Facility: CLINIC | Age: 65
End: 2020-11-23

## 2020-11-24 ENCOUNTER — HOSPITAL ENCOUNTER (OUTPATIENT)
Dept: RADIOLOGY | Facility: OTHER | Age: 65
Discharge: HOME | End: 2020-11-24
Attending: INTERNAL MEDICINE
Payer: MEDICARE

## 2020-11-24 DIAGNOSIS — J32.9 CHRONIC SINUSITIS, UNSPECIFIED: ICD-10-CM

## 2020-11-24 DIAGNOSIS — I50.23 ACUTE ON CHRONIC SYSTOLIC HEART FAILURE: ICD-10-CM

## 2020-11-24 DIAGNOSIS — J32.0 CHRONIC MAXILLARY SINUSITIS: Primary | ICD-10-CM

## 2020-11-24 PROCEDURE — 70487 CT MAXILLOFACIAL W/DYE: CPT | Mod: TC

## 2020-11-24 PROCEDURE — 70487 CT MAXILLOFACIAL W/DYE: CPT | Mod: 26,,, | Performed by: RADIOLOGY

## 2020-11-24 PROCEDURE — 25500020 PHARM REV CODE 255: Performed by: INTERNAL MEDICINE

## 2020-11-24 PROCEDURE — 71046 XR CHEST PA AND LATERAL: ICD-10-PCS | Mod: 26,,, | Performed by: RADIOLOGY

## 2020-11-24 PROCEDURE — 71046 X-RAY EXAM CHEST 2 VIEWS: CPT | Mod: 26,,, | Performed by: RADIOLOGY

## 2020-11-24 PROCEDURE — 71046 X-RAY EXAM CHEST 2 VIEWS: CPT | Mod: TC,FY

## 2020-11-24 PROCEDURE — 70487 CT SINUSES WITH CONTRAST: ICD-10-PCS | Mod: 26,,, | Performed by: RADIOLOGY

## 2020-11-24 RX ADMIN — IOHEXOL 75 ML: 350 INJECTION, SOLUTION INTRAVENOUS at 04:11

## 2020-11-25 ENCOUNTER — PATIENT MESSAGE (OUTPATIENT)
Dept: RHEUMATOLOGY | Facility: CLINIC | Age: 65
End: 2020-11-25

## 2020-12-01 ENCOUNTER — PATIENT MESSAGE (OUTPATIENT)
Dept: RHEUMATOLOGY | Facility: CLINIC | Age: 65
End: 2020-12-01

## 2020-12-03 ENCOUNTER — OFFICE VISIT (OUTPATIENT)
Dept: CARDIOLOGY | Facility: CLINIC | Age: 65
End: 2020-12-03
Payer: MEDICARE

## 2020-12-03 VITALS
HEART RATE: 99 BPM | SYSTOLIC BLOOD PRESSURE: 138 MMHG | BODY MASS INDEX: 35.49 KG/M2 | DIASTOLIC BLOOD PRESSURE: 86 MMHG | WEIGHT: 261.69 LBS

## 2020-12-03 DIAGNOSIS — E66.01 SEVERE OBESITY: ICD-10-CM

## 2020-12-03 DIAGNOSIS — Z79.4 TYPE 2 DIABETES MELLITUS WITH OTHER CIRCULATORY COMPLICATION, WITH LONG-TERM CURRENT USE OF INSULIN: ICD-10-CM

## 2020-12-03 DIAGNOSIS — E11.59 TYPE 2 DIABETES MELLITUS WITH OTHER CIRCULATORY COMPLICATION, WITH LONG-TERM CURRENT USE OF INSULIN: ICD-10-CM

## 2020-12-03 DIAGNOSIS — I10 ESSENTIAL HYPERTENSION: ICD-10-CM

## 2020-12-03 DIAGNOSIS — I50.22 CHRONIC SYSTOLIC HEART FAILURE: Primary | ICD-10-CM

## 2020-12-03 PROCEDURE — 99214 OFFICE O/P EST MOD 30 MIN: CPT | Mod: S$PBB,,, | Performed by: INTERNAL MEDICINE

## 2020-12-03 PROCEDURE — 99999 PR PBB SHADOW E&M-EST. PATIENT-LVL III: CPT | Mod: PBBFAC,,, | Performed by: INTERNAL MEDICINE

## 2020-12-03 PROCEDURE — 99213 OFFICE O/P EST LOW 20 MIN: CPT | Mod: PBBFAC | Performed by: INTERNAL MEDICINE

## 2020-12-03 PROCEDURE — 99999 PR PBB SHADOW E&M-EST. PATIENT-LVL III: ICD-10-PCS | Mod: PBBFAC,,, | Performed by: INTERNAL MEDICINE

## 2020-12-03 PROCEDURE — 99214 PR OFFICE/OUTPT VISIT, EST, LEVL IV, 30-39 MIN: ICD-10-PCS | Mod: S$PBB,,, | Performed by: INTERNAL MEDICINE

## 2020-12-03 RX ORDER — PREDNISONE 10 MG/1
20 TABLET ORAL DAILY
COMMUNITY
Start: 2020-11-06 | End: 2021-02-02

## 2020-12-03 RX ORDER — CLINDAMYCIN HYDROCHLORIDE 300 MG/1
CAPSULE ORAL
COMMUNITY
Start: 2020-11-25 | End: 2021-03-08 | Stop reason: CLARIF

## 2020-12-03 NOTE — PROGRESS NOTES
"[facility]    Chief Complaint  Chief Complaint   Patient presents with    Congestive Heart Failure       HPI:    Continues to improve from the cardiac standpoint.  [place] wiped out when he started carvedilol.  But, it may be because he had a fever.  So he stopped it and will try again at some point in the near future.  Has been diagnosed with dental abscesses.  On antibiotics.  Reports continued good urine output with furosemide.  Self titrating with success.    Medications  Current Outpatient Medications   Medication Sig Dispense Refill    ACCU-CHEK MULTICLIX LANCET lancets   5    aspirin 81 MG Chew Take 81 mg by mouth.      BD ULTRA-FINE RAHLU PEN NEEDLES 32 x 5/32 " Ndle   9    carvediloL (COREG) 3.125 MG tablet Take 1 tablet (3.125 mg total) by mouth 2 (two) times daily with meals. 180 tablet 3    clindamycin (CLEOCIN) 300 MG capsule TK 1 C PO QID      escitalopram oxalate (LEXAPRO) 20 MG tablet   4    folic acid (FOLVITE) 1 MG tablet   4    furosemide (LASIX) 40 MG tablet Take 40 mg by mouth once daily.       hydrocodone-acetaminophen 10-325mg (NORCO)  mg Tab   0    lamiVUDine (EPIVIR) 150 MG Tab TAKE 1 TABLET(150 MG) BY MOUTH EVERY DAY 30 tablet 6    lorazepam (ATIVAN) 1 MG tablet   1    losartan (COZAAR) 100 MG tablet 100 mg once daily.      NOVOLOG FLEXPEN 100 unit/mL InPn pen 50 Units 3 (three) times daily with meals.   1    OXYGEN-AIR DELIVERY SYSTEMS MISC by Misc.(Non-Drug; Combo Route) route.      potassium chloride (MICRO-K) 10 MEQ CpSR   4    predniSONE (DELTASONE) 10 MG tablet Take 20 mg by mouth once daily. Titrate as needed      PROAIR HFA 90 mcg/actuation inhaler       TRESIBA FLEXTOUCH U-200 200 unit/mL (3 mL) InPn 50 Units 2 (two) times daily.        No current facility-administered medications for this visit.         History  Past Medical History:   Diagnosis Date    Arthritis     Diabetes mellitus     Hyperlipidemia     Hypertension      Past Surgical " History:   Procedure Laterality Date    PLEURA BIOPSY      RESECTION OF ATRIAL MYXOMA  2007     Social History     Socioeconomic History    Marital status: Single     Spouse name: Not on file    Number of children: Not on file    Years of education: Not on file    Highest education level: Not on file   Occupational History    Not on file   Social Needs    Financial resource strain: Not on file    Food insecurity     Worry: Not on file     Inability: Not on file    Transportation needs     Medical: Not on file     Non-medical: Not on file   Tobacco Use    Smoking status: Current Every Day Smoker     Packs/day: 1.00     Years: 35.00     Pack years: 35.00     Types: Cigarettes    Smokeless tobacco: Current User     Types: Snuff   Substance and Sexual Activity    Alcohol use: No    Drug use: No    Sexual activity: Never   Lifestyle    Physical activity     Days per week: Not on file     Minutes per session: Not on file    Stress: Not on file   Relationships    Social connections     Talks on phone: Not on file     Gets together: Not on file     Attends Protestant service: Not on file     Active member of club or organization: Not on file     Attends meetings of clubs or organizations: Not on file     Relationship status: Not on file   Other Topics Concern    Not on file   Social History Narrative    Not on file     Family History   Problem Relation Age of Onset    Hodgkin's lymphoma Mother     Kidney failure Father     Diabetes type I Father     Cancer Sister         Allergies  Review of patient's allergies indicates:   Allergen Reactions    Beta-blockers (beta-adrenergic blocking agts) Shortness Of Breath     wheezing    Nsaids (non-steroidal anti-inflammatory drug) Other (See Comments)     hypertention    Statins-hmg-coa reductase inhibitors Other (See Comments)     Heart arrythemias    Pcn [penicillins] Rash       Review of Systems   Review of Systems   Constitution: Negative for  malaise/fatigue, weight gain and weight loss.   Eyes: Negative for visual disturbance.   Cardiovascular: Positive for dyspnea on exertion and leg swelling. Negative for chest pain, claudication, cyanosis, irregular heartbeat, near-syncope, orthopnea, palpitations, paroxysmal nocturnal dyspnea and syncope.   Respiratory: Negative for cough, hemoptysis, shortness of breath, sleep disturbances due to breathing and wheezing.    Hematologic/Lymphatic: Negative for bleeding problem. Does not bruise/bleed easily.   Skin: Negative for poor wound healing.   Musculoskeletal: Negative for muscle cramps and myalgias.   Gastrointestinal: Negative for abdominal pain, anorexia, diarrhea, heartburn, hematemesis, hematochezia, melena, nausea and vomiting.   Genitourinary: Negative for hematuria and nocturia.   Neurological: Negative for excessive daytime sleepiness, dizziness, focal weakness, light-headedness and weakness.       Physical Exam  Vitals:    12/03/20 1430   BP: 138/86   Pulse: 99     Wt Readings from Last 1 Encounters:   12/03/20 118.7 kg (261 lb 11 oz)     Physical Exam   Constitutional: He is oriented to person, place, and time. He is cooperative.  Non-toxic appearance. No distress.   HENT:   Head: Normocephalic and atraumatic.   Eyes: Conjunctivae are normal. No scleral icterus.   Neck: Neck supple. No hepatojugular reflux and no JVD present. Carotid bruit is not present. No tracheal deviation present. No thyromegaly present.   Cardiovascular: Normal rate, regular rhythm, S1 normal and S2 normal.  No extrasystoles are present. PMI is not displaced. Exam reveals gallop and S4. Exam reveals no S3.   No murmur heard.  Pulses:       Carotid pulses are 2+ on the right side and 2+ on the left side.       Radial pulses are 2+ on the right side and 2+ on the left side.        Dorsalis pedis pulses are 2+ on the right side and 2+ on the left side.        Posterior tibial pulses are 2+ on the right side and 2+ on the left  side.   Pulmonary/Chest: No accessory muscle usage. No respiratory distress. He has no decreased breath sounds. He has no wheezes. He has no rhonchi. He has no rales.   Abdominal: Soft. Bowel sounds are normal. He exhibits no pulsatile liver, no abdominal bruit and no pulsatile midline mass. There is no splenomegaly or hepatomegaly. There is no abdominal tenderness.   Musculoskeletal:         General: Edema (1+ bilateral pretibial edema, wearing compression stockings.) present. No tenderness or deformity.   Neurological: He is alert and oriented to person, place, and time. He has normal strength. No cranial nerve deficit or sensory deficit.   Skin: Skin is warm, dry and intact. He is not diaphoretic. No cyanosis. No pallor. Nails show no clubbing.   Psychiatric: He has a normal mood and affect. His speech is normal and behavior is normal.       Labs  Lab Visit on 11/24/2020   Component Date Value Ref Range Status    CRP 11/24/2020 0.4  0.0 - 8.2 mg/L Final    Sodium 11/24/2020 138  136 - 145 mmol/L Final    Potassium 11/24/2020 4.0  3.5 - 5.1 mmol/L Final    Chloride 11/24/2020 102  95 - 110 mmol/L Final    CO2 11/24/2020 26  23 - 29 mmol/L Final    Glucose 11/24/2020 257* 70 - 110 mg/dL Final    BUN 11/24/2020 16  8 - 23 mg/dL Final    Creatinine 11/24/2020 1.2  0.5 - 1.4 mg/dL Final    Calcium 11/24/2020 8.8  8.7 - 10.5 mg/dL Final    Total Protein 11/24/2020 6.5  6.0 - 8.4 g/dL Final    Albumin 11/24/2020 3.6  3.5 - 5.2 g/dL Final    Total Bilirubin 11/24/2020 0.4  0.1 - 1.0 mg/dL Final    Comment: For infants and newborns, interpretation of results should be based  on gestational age, weight and in agreement with clinical  observations.  Premature Infant recommended reference ranges:  Up to 24 hours.............<8.0 mg/dL  Up to 48 hours............<12.0 mg/dL  3-5 days..................<15.0 mg/dL  6-29 days.................<15.0 mg/dL      Alkaline Phosphatase 11/24/2020 76  55 - 135 U/L Final     AST 11/24/2020 23  10 - 40 U/L Final    ALT 11/24/2020 46* 10 - 44 U/L Final    Anion Gap 11/24/2020 10  8 - 16 mmol/L Final    eGFR if African American 11/24/2020 >60  >60 mL/min/1.73 m^2 Final    eGFR if non African American 11/24/2020 >60  >60 mL/min/1.73 m^2 Final    Comment: Calculation used to obtain the estimated glomerular filtration  rate (eGFR) is the CKD-EPI equation.       WBC 11/24/2020 7.64  3.90 - 12.70 K/uL Final    RBC 11/24/2020 5.63  4.60 - 6.20 M/uL Final    Hemoglobin 11/24/2020 17.9  14.0 - 18.0 g/dL Final    Hematocrit 11/24/2020 54.4* 40.0 - 54.0 % Final    MCV 11/24/2020 97  82 - 98 fL Final    MCH 11/24/2020 31.8* 27.0 - 31.0 pg Final    MCHC 11/24/2020 32.9  32.0 - 36.0 g/dL Final    RDW 11/24/2020 11.8  11.5 - 14.5 % Final    Platelets 11/24/2020 164  150 - 350 K/uL Final    MPV 11/24/2020 12.2  9.2 - 12.9 fL Final    Immature Granulocytes 11/24/2020 0.3  0.0 - 0.5 % Final    Gran # (ANC) 11/24/2020 4.5  1.8 - 7.7 K/uL Final    Immature Grans (Abs) 11/24/2020 0.02  0.00 - 0.04 K/uL Final    Comment: Mild elevation in immature granulocytes is non specific and   can be seen in a variety of conditions including stress response,   acute inflammation, trauma and pregnancy. Correlation with other   laboratory and clinical findings is essential.      Lymph # 11/24/2020 2.4  1.0 - 4.8 K/uL Final    Mono # 11/24/2020 0.5  0.3 - 1.0 K/uL Final    Eos # 11/24/2020 0.2  0.0 - 0.5 K/uL Final    Baso # 11/24/2020 0.05  0.00 - 0.20 K/uL Final    nRBC 11/24/2020 0  0 /100 WBC Final    Gran % 11/24/2020 58.8  38.0 - 73.0 % Final    Lymph % 11/24/2020 31.7  18.0 - 48.0 % Final    Mono % 11/24/2020 6.3  4.0 - 15.0 % Final    Eosinophil % 11/24/2020 2.2  0.0 - 8.0 % Final    Basophil % 11/24/2020 0.7  0.0 - 1.9 % Final    Differential Method 11/24/2020 Automated   Final    Sed Rate 11/24/2020 2  0 - 10 mm/Hr Final    Hep B Viral DNA IU/ML 11/24/2020 <10  <10 IU/mL Final    Log  HBV IU/mL 11/24/2020 <1.00  <1.00 Log (10) IU/mL Final    Comment: This procedure utilizes a real-time polymerase chain  reaction test from [employer].  The amplification   target is a conserved region in the terminal third of  the hepatitis B surface antigen gene. The lower limit of   quantitation is 10 IU/mL (1.00 Log IU/mL) and the uppper  limit of quantitation is 1 billion IU/mL (9.00 Log IU/mL).  The qualitative limit of detection is 10 IU/mL   (1.00 Log IU/mL). A  Not detected  result does not rule   out infection.  Test performed at [facility],  [address] , [place]  [address]     [phone]  Demetrius Parikh MD  - Medical Director      Hepatitis B Virus DNA 11/24/2020 Not detected  Not detected Final   Lab Visit on 11/24/2020   Component Date Value Ref Range Status    Magnesium 11/24/2020 2.0  1.6 - 2.6 mg/dL Final    BNP 11/24/2020 109* 0 - 99 pg/mL Final    Values of less than 100 pg/ml are consistent with non-CHF populations.   Lab Visit on 11/18/2020   Component Date Value Ref Range Status    Sodium 11/18/2020 141  136 - 145 mmol/L Final    Potassium 11/18/2020 3.8  3.5 - 5.1 mmol/L Final    Chloride 11/18/2020 102  95 - 110 mmol/L Final    CO2 11/18/2020 29  23 - 29 mmol/L Final    Glucose 11/18/2020 101  70 - 110 mg/dL Final    BUN 11/18/2020 15  8 - 23 mg/dL Final    Creatinine 11/18/2020 1.0  0.5 - 1.4 mg/dL Final    Calcium 11/18/2020 9.0  8.7 - 10.5 mg/dL Final    Anion Gap 11/18/2020 10  8 - 16 mmol/L Final    eGFR if African American 11/18/2020 >60  >60 mL/min/1.73 m^2 Final    eGFR if non African American 11/18/2020 >60  >60 mL/min/1.73 m^2 Final    Comment: Calculation used to obtain the estimated glomerular filtration  rate (eGFR) is the CKD-EPI equation.       Magnesium 11/18/2020 2.1  1.6 - 2.6 mg/dL Final   Hospital Outpatient Visit on 11/10/2020   Component Date Value Ref Range Status    BSA 11/10/2020 2.44  m2 Final    TDI SEPTAL 11/10/2020  0.06  m/s Final    LV LATERAL E/E' RATIO 11/10/2020 7.86  m/s Final    LV SEPTAL E/E' RATIO 11/10/2020 9.17  m/s Final    LA WIDTH 11/10/2020 3.44  cm Final    TDI LATERAL 11/10/2020 0.07  m/s Final    PV PEAK VELOCITY 11/10/2020 0.88  cm/s Final    LVIDd 11/10/2020 5.30  3.5 - 6.0 cm Final    IVS 11/10/2020 1.18* 0.6 - 1.1 cm Final    Posterior Wall 11/10/2020 1.19* 0.6 - 1.1 cm Final    LVIDs 11/10/2020 4.01* 2.1 - 4.0 cm Final    FS 11/10/2020 24  28 - 44 % Final    LA volume 11/10/2020 60.01  cm3 Final    Sinus 11/10/2020 4.07  cm Final    STJ 11/10/2020 3.95  cm Final    Ascending aorta 11/10/2020 3.72  cm Final    LV mass 11/10/2020 252.15  g Final    LA size 11/10/2020 4.25  cm Final    RVDD 11/10/2020 2.20  cm Final    TAPSE 11/10/2020 1.99  cm Final    RV S' 11/10/2020 12.85  cm/s Final    Left Ventricle Relative Wall Thick* 11/10/2020 0.45  cm Final    AV mean gradient 11/10/2020 2  mmHg Final    AV valve area 11/10/2020 4.29  cm2 Final    AV Velocity Ratio 11/10/2020 0.91   Final    AV index (prosthetic) 11/10/2020 1.02   Final    MV valve area p 1/2 method 11/10/2020 10.72  cm2 Final    E/A ratio 11/10/2020 0.65   Final    Mean e' 11/10/2020 0.07  m/s Final    E wave decelartion time 11/10/2020 70.81  msec Final    IVRT 11/10/2020 85.03  msec Final    Pulm vein S/D ratio 11/10/2020 1.64   Final    LVOT diameter 11/10/2020 2.31  cm Final    LVOT area 11/10/2020 4.2  cm2 Final    LVOT peak ildefonso 11/10/2020 0.86  m/s Final    LVOT peak VTI 11/10/2020 15.91  cm Final    Ao peak ildefonso 11/10/2020 0.94  m/s Final    Ao VTI 11/10/2020 15.54  cm Final    LVOT stroke volume 11/10/2020 66.64  cm3 Final    AV peak gradient 11/10/2020 4  mmHg Final    E/E' ratio 11/10/2020 8.46  m/s Final    MV Peak E Ildefonso 11/10/2020 0.55  m/s Final    MV stenosis pressure 1/2 time 11/10/2020 20.53  ms Final    MV Peak A Ildefonso 11/10/2020 0.85  m/s Final    PV Peak S Ildefonso 11/10/2020 0.54  m/s Final     PV Peak D Ildefonso 11/10/2020 0.33  m/s Final    LV Systolic Volume 11/10/2020 70.22  mL Final    LV Systolic Volume Index 11/10/2020 29.5  mL/m2 Final    LV Diastolic Volume 11/10/2020 135.33  mL Final    LV Diastolic Volume Index 11/10/2020 56.91  mL/m2 Final    LA Volume Index 11/10/2020 25.2  mL/m2 Final    LV Mass Index 11/10/2020 106  g/m2 Final    RA Major Axis 11/10/2020 4.19  cm Final    Left Atrium Minor Axis 11/10/2020 5.01  cm Final    Left Atrium Major Axis 11/10/2020 4.66  cm Final    LA Volume Index (Mod) 11/10/2020 21.0  mL/m2 Final    LA volume (mod) 11/10/2020 50.00  cm3 Final    RA Width 11/10/2020 3.13  cm Final    Right Atrial Pressure (from IVC) 11/10/2020 3  mmHg Final   Lab Visit on 11/10/2020   Component Date Value Ref Range Status    Sodium 11/10/2020 140  136 - 145 mmol/L Final    Potassium 11/10/2020 3.8  3.5 - 5.1 mmol/L Final    Specimen slightly hemolyzed    Chloride 11/10/2020 102  95 - 110 mmol/L Final    CO2 11/10/2020 24  23 - 29 mmol/L Final    Glucose 11/10/2020 231* 70 - 110 mg/dL Final    BUN 11/10/2020 16  8 - 23 mg/dL Final    Creatinine 11/10/2020 1.1  0.5 - 1.4 mg/dL Final    Calcium 11/10/2020 8.8  8.7 - 10.5 mg/dL Final    Total Protein 11/10/2020 6.8  6.0 - 8.4 g/dL Final    Albumin 11/10/2020 3.7  3.5 - 5.2 g/dL Final    Total Bilirubin 11/10/2020 0.5  0.1 - 1.0 mg/dL Final    Comment: For infants and newborns, interpretation of results should be based  on gestational age, weight and in agreement with clinical  observations.  Premature Infant recommended reference ranges:  Up to 24 hours.............<8.0 mg/dL  Up to 48 hours............<12.0 mg/dL  3-5 days..................<15.0 mg/dL  6-29 days.................<15.0 mg/dL      Alkaline Phosphatase 11/10/2020 77  55 - 135 U/L Final    AST 11/10/2020 20  10 - 40 U/L Final    ALT 11/10/2020 44  10 - 44 U/L Final    Anion Gap 11/10/2020 14  8 - 16 mmol/L Final    eGFR if   11/10/2020 >60  >60 mL/min/1.73 m^2 Final    eGFR if non African American 11/10/2020 >60  >60 mL/min/1.73 m^2 Final    Comment: Calculation used to obtain the estimated glomerular filtration  rate (eGFR) is the CKD-EPI equation.       WBC 11/10/2020 8.43  3.90 - 12.70 K/uL Final    RBC 11/10/2020 5.77  4.60 - 6.20 M/uL Final    Hemoglobin 11/10/2020 18.2* 14.0 - 18.0 g/dL Final    Hematocrit 11/10/2020 55.5* 40.0 - 54.0 % Final    MCV 11/10/2020 96  82 - 98 fL Final    MCH 11/10/2020 31.5* 27.0 - 31.0 pg Final    MCHC 11/10/2020 32.8  32.0 - 36.0 g/dL Final    RDW 11/10/2020 11.9  11.5 - 14.5 % Final    Platelets 11/10/2020 169  150 - 350 K/uL Final    MPV 11/10/2020 12.5  9.2 - 12.9 fL Final    Immature Granulocytes 11/10/2020 0.6* 0.0 - 0.5 % Final    Gran # (ANC) 11/10/2020 4.3  1.8 - 7.7 K/uL Final    Immature Grans (Abs) 11/10/2020 0.05* 0.00 - 0.04 K/uL Final    Comment: Mild elevation in immature granulocytes is non specific and   can be seen in a variety of conditions including stress response,   acute inflammation, trauma and pregnancy. Correlation with other   laboratory and clinical findings is essential.      Lymph # 11/10/2020 3.1  1.0 - 4.8 K/uL Final    Mono # 11/10/2020 0.7  0.3 - 1.0 K/uL Final    Eos # 11/10/2020 0.2  0.0 - 0.5 K/uL Final    Baso # 11/10/2020 0.06  0.00 - 0.20 K/uL Final    nRBC 11/10/2020 0  0 /100 WBC Final    Gran % 11/10/2020 50.9  38.0 - 73.0 % Final    Lymph % 11/10/2020 37.1  18.0 - 48.0 % Final    Mono % 11/10/2020 8.3  4.0 - 15.0 % Final    Eosinophil % 11/10/2020 2.4  0.0 - 8.0 % Final    Basophil % 11/10/2020 0.7  0.0 - 1.9 % Final    Differential Method 11/10/2020 Automated   Final    Sodium 11/10/2020 140  136 - 145 mmol/L Final    Potassium 11/10/2020 3.8  3.5 - 5.1 mmol/L Final    Specimen slightly hemolyzed    Chloride 11/10/2020 102  95 - 110 mmol/L Final    CO2 11/10/2020 24  23 - 29 mmol/L Final    Glucose 11/10/2020 231* 70 - 110  mg/dL Final    BUN 11/10/2020 16  8 - 23 mg/dL Final    Creatinine 11/10/2020 1.1  0.5 - 1.4 mg/dL Final    Calcium 11/10/2020 8.8  8.7 - 10.5 mg/dL Final    Total Protein 11/10/2020 6.8  6.0 - 8.4 g/dL Final    Albumin 11/10/2020 3.7  3.5 - 5.2 g/dL Final    Total Bilirubin 11/10/2020 0.5  0.1 - 1.0 mg/dL Final    Comment: For infants and newborns, interpretation of results should be based  on gestational age, weight and in agreement with clinical  observations.  Premature Infant recommended reference ranges:  Up to 24 hours.............<8.0 mg/dL  Up to 48 hours............<12.0 mg/dL  3-5 days..................<15.0 mg/dL  6-29 days.................<15.0 mg/dL      Alkaline Phosphatase 11/10/2020 77  55 - 135 U/L Final    AST 11/10/2020 20  10 - 40 U/L Final    ALT 11/10/2020 44  10 - 44 U/L Final    Anion Gap 11/10/2020 14  8 - 16 mmol/L Final    eGFR if African American 11/10/2020 >60  >60 mL/min/1.73 m^2 Final    eGFR if non African American 11/10/2020 >60  >60 mL/min/1.73 m^2 Final    Comment: Calculation used to obtain the estimated glomerular filtration  rate (eGFR) is the CKD-EPI equation.       WBC 11/10/2020 8.43  3.90 - 12.70 K/uL Final    RBC 11/10/2020 5.77  4.60 - 6.20 M/uL Final    Hemoglobin 11/10/2020 18.2* 14.0 - 18.0 g/dL Final    Hematocrit 11/10/2020 55.5* 40.0 - 54.0 % Final    MCV 11/10/2020 96  82 - 98 fL Final    MCH 11/10/2020 31.5* 27.0 - 31.0 pg Final    MCHC 11/10/2020 32.8  32.0 - 36.0 g/dL Final    RDW 11/10/2020 11.9  11.5 - 14.5 % Final    Platelets 11/10/2020 169  150 - 350 K/uL Final    MPV 11/10/2020 12.5  9.2 - 12.9 fL Final    Immature Granulocytes 11/10/2020 0.6* 0.0 - 0.5 % Final    Gran # (ANC) 11/10/2020 4.3  1.8 - 7.7 K/uL Final    Immature Grans (Abs) 11/10/2020 0.05* 0.00 - 0.04 K/uL Final    Comment: Mild elevation in immature granulocytes is non specific and   can be seen in a variety of conditions including stress response,   acute  inflammation, trauma and pregnancy. Correlation with other   laboratory and clinical findings is essential.      Lymph # 11/10/2020 3.1  1.0 - 4.8 K/uL Final    Mono # 11/10/2020 0.7  0.3 - 1.0 K/uL Final    Eos # 11/10/2020 0.2  0.0 - 0.5 K/uL Final    Baso # 11/10/2020 0.06  0.00 - 0.20 K/uL Final    nRBC 11/10/2020 0  0 /100 WBC Final    Gran % 11/10/2020 50.9  38.0 - 73.0 % Final    Lymph % 11/10/2020 37.1  18.0 - 48.0 % Final    Mono % 11/10/2020 8.3  4.0 - 15.0 % Final    Eosinophil % 11/10/2020 2.4  0.0 - 8.0 % Final    Basophil % 11/10/2020 0.7  0.0 - 1.9 % Final    Differential Method 11/10/2020 Automated   Final    BNP 11/10/2020 70  0 - 99 pg/mL Final    Values of less than 100 pg/ml are consistent with non-CHF populations.    Troponin I 11/10/2020 0.024  0.000 - 0.026 ng/mL Final    Comment: The reference interval for Troponin I represents the 99th percentile   cutoff   for our facility and is consistent with 3rd generation assay   performance.      D-Dimer 11/10/2020 0.50* <0.50 mg/L FEU Final    Comment: The quantitative D-dimer assay should be used as an aid in   the diagnosis of deep vein thrombosis and pulmonary embolism  in patients with the appropriate presentation and clinical  history. The upper limit of the reference interval and the clinical   cut off   point are identical. Causes of a positive (>0.50 mg/L FEU) D-Dimer   test  include, but are not limited to: DVT, PE, DIC, thrombolytic   therapy, anticoagulant therapy, recent surgery, trauma, or   pregnancy, disseminated malignancy, aortic aneurysm, cirrhosis,  and severe infection. False negative results may occur in   patients with distal DVT.      CPK 11/10/2020 133  20 - 200 U/L Final    CPK MB 11/10/2020 3.5  0.1 - 6.5 ng/mL Final    MB % 11/10/2020 2.6  0.0 - 5.0 % Final    Comment: To be positive, the MB% must be greater than 5% AND the CK-MB  greater than 6.5 ng/mL. Values not in the reference interval,   but not  qualifying as positive, should be considered   &quot;trace&quot;.      Amylase 11/10/2020 38  20 - 110 U/L Final    Magnesium 11/10/2020 1.9  1.6 - 2.6 mg/dL Final    TSH 11/10/2020 1.916  0.400 - 4.000 uIU/mL Final   Lab Visit on 09/22/2020   Component Date Value Ref Range Status    Sodium 09/22/2020 140  136 - 145 mmol/L Final    Potassium 09/22/2020 4.3  3.5 - 5.1 mmol/L Final    Chloride 09/22/2020 107  95 - 110 mmol/L Final    CO2 09/22/2020 24  23 - 29 mmol/L Final    Glucose 09/22/2020 169* 70 - 110 mg/dL Final    BUN 09/22/2020 13  8 - 23 mg/dL Final    Creatinine 09/22/2020 0.9  0.5 - 1.4 mg/dL Final    Calcium 09/22/2020 8.7  8.7 - 10.5 mg/dL Final    Total Protein 09/22/2020 6.7  6.0 - 8.4 g/dL Final    Albumin 09/22/2020 3.7  3.5 - 5.2 g/dL Final    Total Bilirubin 09/22/2020 0.6  0.1 - 1.0 mg/dL Final    Comment: For infants and newborns, interpretation of results should be based  on gestational age, weight and in agreement with clinical  observations.  Premature Infant recommended reference ranges:  Up to 24 hours.............<8.0 mg/dL  Up to 48 hours............<12.0 mg/dL  3-5 days..................<15.0 mg/dL  6-29 days.................<15.0 mg/dL      Alkaline Phosphatase 09/22/2020 72  55 - 135 U/L Final    AST 09/22/2020 23  10 - 40 U/L Final    ALT 09/22/2020 45* 10 - 44 U/L Final    Anion Gap 09/22/2020 9  8 - 16 mmol/L Final    eGFR if African American 09/22/2020 >60  >60 mL/min/1.73 m^2 Final    eGFR if non African American 09/22/2020 >60  >60 mL/min/1.73 m^2 Final    Comment: Calculation used to obtain the estimated glomerular filtration  rate (eGFR) is the CKD-EPI equation.       CRP 09/22/2020 0.6  0.0 - 8.2 mg/L Final    WBC 09/22/2020 5.68  3.90 - 12.70 K/uL Final    RBC 09/22/2020 5.51  4.60 - 6.20 M/uL Final    Hemoglobin 09/22/2020 18.1* 14.0 - 18.0 g/dL Final    Hematocrit 09/22/2020 54.8* 40.0 - 54.0 % Final    MCV 09/22/2020 100* 82 - 98 fL Final    MCH  09/22/2020 32.8* 27.0 - 31.0 pg Final    MCHC 09/22/2020 33.0  32.0 - 36.0 g/dL Final    RDW 09/22/2020 12.3  11.5 - 14.5 % Final    Platelets 09/22/2020 137* 150 - 350 K/uL Final    MPV 09/22/2020 11.9  9.2 - 12.9 fL Final    Immature Granulocytes 09/22/2020 0.9* 0.0 - 0.5 % Final    Gran # (ANC) 09/22/2020 3.1  1.8 - 7.7 K/uL Final    Immature Grans (Abs) 09/22/2020 0.05* 0.00 - 0.04 K/uL Final    Comment: Mild elevation in immature granulocytes is non specific and   can be seen in a variety of conditions including stress response,   acute inflammation, trauma and pregnancy. Correlation with other   laboratory and clinical findings is essential.      Lymph # 09/22/2020 1.9  1.0 - 4.8 K/uL Final    Mono # 09/22/2020 0.4  0.3 - 1.0 K/uL Final    Eos # 09/22/2020 0.1  0.0 - 0.5 K/uL Final    Baso # 09/22/2020 0.04  0.00 - 0.20 K/uL Final    nRBC 09/22/2020 0  0 /100 WBC Final    Gran % 09/22/2020 55.3  38.0 - 73.0 % Final    Lymph % 09/22/2020 33.8  18.0 - 48.0 % Final    Mono % 09/22/2020 7.2  4.0 - 15.0 % Final    Eosinophil % 09/22/2020 2.1  0.0 - 8.0 % Final    Basophil % 09/22/2020 0.7  0.0 - 1.9 % Final    Differential Method 09/22/2020 Automated   Final    Hemoglobin A1C 09/22/2020 8.8* 4.0 - 5.6 % Final    Comment: ADA Screening Guidelines:  5.7-6.4%  Consistent with prediabetes  >or=6.5%  Consistent with diabetes  High levels of fetal hemoglobin interfere with the HbA1C  assay. Heterozygous hemoglobin variants (HbS, HgC, etc)do  not significantly interfere with this assay.   However, presence of multiple variants may affect accuracy.      Estimated Avg Glucose 09/22/2020 206* 68 - 131 mg/dL Final    Sed Rate 09/22/2020 1  0 - 10 mm/Hr Final    Hep B Viral DNA IU/ML 09/22/2020 <10  <10 IU/mL Final    Log HBV IU/mL 09/22/2020 <1.00  <1.00 Log (10) IU/mL Final    Comment: This procedure utilizes a real-time polymerase chain  reaction test from [employer].  The amplification   target  is a conserved region in the terminal third of  the hepatitis B surface antigen gene. The lower limit of   quantitation is 10 IU/mL (1.00 Log IU/mL) and the uppper  limit of quantitation is 1 billion IU/mL (9.00 Log IU/mL).  The qualitative limit of detection is 10 IU/mL   (1.00 Log IU/mL). A  Not detected  result does not rule   out infection.  Test performed at [facility],  [address] , [place]  [address]     [phone]  Demetrius Parikh MD  - Medical Director      Hepatitis B Virus DNA 09/22/2020 Not detected  Not detected Final    Hemoglobin A1C 09/22/2020 8.6* 4.0 - 5.6 % Final    Comment: ADA Screening Guidelines:  5.7-6.4%  Consistent with prediabetes  >or=6.5%  Consistent with diabetes  High levels of fetal hemoglobin interfere with the HbA1C  assay. Heterozygous hemoglobin variants (HbS, HgC, etc)do  not significantly interfere with this assay.   However, presence of multiple variants may affect accuracy.      Estimated Avg Glucose 09/22/2020 200* 68 - 131 mg/dL Final    TSH 09/22/2020 1.085  0.400 - 4.000 uIU/mL Final    Free T4 09/22/2020 0.98  0.71 - 1.51 ng/dL Final    T3, Free 09/22/2020 2.9  2.3 - 4.2 pg/mL Final    D-Dimer 09/22/2020 0.45  <0.50 mg/L FEU Final    Comment: The quantitative D-dimer assay should be used as an aid in   the diagnosis of deep vein thrombosis and pulmonary embolism  in patients with the appropriate presentation and clinical  history. The upper limit of the reference interval and the clinical   cut off   point are identical. Causes of a positive (>0.50 mg/L FEU) D-Dimer   test  include, but are not limited to: DVT, PE, DIC, thrombolytic   therapy, anticoagulant therapy, recent surgery, trauma, or   pregnancy, disseminated malignancy, aortic aneurysm, cirrhosis,  and severe infection. False negative results may occur in   patients with distal DVT.      BNP 09/22/2020 81  0 - 99 pg/mL Final    Values of less than 100 pg/ml are consistent with  non-CHF populations.    Cortisol 09/22/2020 5.30  ug/dL Final    Comment: Cortisol Reference Range:  Before 10:00 am: 4.46-22.7 ug/dL  After 5:00 pm:    1.7-14.1 ug/dL     Lab Visit on 08/14/2020   Component Date Value Ref Range Status    WBC 08/14/2020 7.48  3.90 - 12.70 K/uL Final    RBC 08/14/2020 5.57  4.60 - 6.20 M/uL Final    Hemoglobin 08/14/2020 18.0  14.0 - 18.0 g/dL Final    Hematocrit 08/14/2020 54.8* 40.0 - 54.0 % Final    MCV 08/14/2020 98  82 - 98 fL Final    MCH 08/14/2020 32.3* 27.0 - 31.0 pg Final    MCHC 08/14/2020 32.8  32.0 - 36.0 g/dL Final    RDW 08/14/2020 13.5  11.5 - 14.5 % Final    Platelets 08/14/2020 162  150 - 350 K/uL Final    MPV 08/14/2020 12.3  9.2 - 12.9 fL Final    Immature Granulocytes 08/14/2020 0.5  0.0 - 0.5 % Final    Gran # (ANC) 08/14/2020 5.2  1.8 - 7.7 K/uL Final    Immature Grans (Abs) 08/14/2020 0.04  0.00 - 0.04 K/uL Final    Comment: Mild elevation in immature granulocytes is non specific and   can be seen in a variety of conditions including stress response,   acute inflammation, trauma and pregnancy. Correlation with other   laboratory and clinical findings is essential.      Lymph # 08/14/2020 1.6  1.0 - 4.8 K/uL Final    Mono # 08/14/2020 0.5  0.3 - 1.0 K/uL Final    Eos # 08/14/2020 0.1  0.0 - 0.5 K/uL Final    Baso # 08/14/2020 0.06  0.00 - 0.20 K/uL Final    nRBC 08/14/2020 0  0 /100 WBC Final    Gran % 08/14/2020 68.8  38.0 - 73.0 % Final    Lymph % 08/14/2020 20.9  18.0 - 48.0 % Final    Mono % 08/14/2020 7.1  4.0 - 15.0 % Final    Eosinophil % 08/14/2020 1.9  0.0 - 8.0 % Final    Basophil % 08/14/2020 0.8  0.0 - 1.9 % Final    Differential Method 08/14/2020 Automated   Final    CRP 08/14/2020 0.5  0.0 - 8.2 mg/L Final    Sodium 08/14/2020 140  136 - 145 mmol/L Final    Potassium 08/14/2020 4.1  3.5 - 5.1 mmol/L Final    Chloride 08/14/2020 103  95 - 110 mmol/L Final    CO2 08/14/2020 24  23 - 29 mmol/L Final    Glucose  08/14/2020 225* 70 - 110 mg/dL Final    BUN 08/14/2020 17  8 - 23 mg/dL Final    Creatinine 08/14/2020 1.0  0.5 - 1.4 mg/dL Final    Calcium 08/14/2020 8.7  8.7 - 10.5 mg/dL Final    Total Protein 08/14/2020 6.7  6.0 - 8.4 g/dL Final    Albumin 08/14/2020 3.6  3.5 - 5.2 g/dL Final    Total Bilirubin 08/14/2020 0.4  0.1 - 1.0 mg/dL Final    Comment: For infants and newborns, interpretation of results should be based  on gestational age, weight and in agreement with clinical  observations.  Premature Infant recommended reference ranges:  Up to 24 hours.............<8.0 mg/dL  Up to 48 hours............<12.0 mg/dL  3-5 days..................<15.0 mg/dL  6-29 days.................<15.0 mg/dL      Alkaline Phosphatase 08/14/2020 72  55 - 135 U/L Final    AST 08/14/2020 16  10 - 40 U/L Final    ALT 08/14/2020 36  10 - 44 U/L Final    Anion Gap 08/14/2020 13  8 - 16 mmol/L Final    eGFR if African American 08/14/2020 >60  >60 mL/min/1.73 m^2 Final    eGFR if non African American 08/14/2020 >60  >60 mL/min/1.73 m^2 Final    Comment: Calculation used to obtain the estimated glomerular filtration  rate (eGFR) is the CKD-EPI equation.       Sed Rate 08/14/2020 2  0 - 10 mm/Hr Final       Imaging  X-ray Chest Pa And Lateral    Result Date: 11/24/2020  EXAMINATION: XR CHEST PA AND LATERAL CLINICAL HISTORY: Acute on chronic systolic (congestive) heart failure TECHNIQUE: PA and lateral views of the chest were performed. COMPARISON: 09/28/2020 FINDINGS: Heart is enlarged but unchanged.  Persistent scarring at the right lung base.  No focal consolidation.  There may be a trace right pleural effusion.  Mild central vascular congestion.     Cardiomegaly and mild interstitial edema. Scarring at the right lung base, stable from multiple priors.  There may be a trace right pleural effusion. Electronically signed by: Ventura Jordan MD Date:    11/24/2020 Time:    16:05    Ct Sinuses With Contrast    Result Date:  11/24/2020  EXAMINATION: CT SINUSES WITH CONTRAST CLINICAL HISTORY: Sinusitis, invasive fungal suspected (Ped 0-18y);  Chronic sinusitis, unspecified TECHNIQUE: 0.625 mm axial images of the paranasal sinuses post intravenous contrast.  75 mL of Omnipaque 350 was injected intravenously.  Coronal and sagittal reformatted imaging was with reformatted from the axial acquisition. COMPARISON: None FINDINGS: The frontal sinuses are relatively clear with only trace mucosal thickening inferior frontal sinuses extending to the frontal ethmoid recesses measuring 2 mm in greatest thickness. There is mild moderate patchy mucosal thickening in the ethmoid air cells bilaterally most pronounced anteriorly on the right measuring 3-4 mm in thickness.  Sphenoid sinuses are relatively clear with trace mucosal thickening along the sphenoid ethmoid recesses. There is lobular opacity along the floor of the right maxillary antrum may represent mucous retention cyst or lobular mucosal thickening. There is a large underlying periapical cyst directly underlying this involving the right maxillary 1st molar tooth cannot exclude component of odontogenic sinus disease.  Additional periapical cyst associated with the left maxillary 1st molar tooth. Please note that the periapical cyst associated with the right maxillary 1st molar tooth extends into the pre maxillary soft tissues concerning for dental abscess measuring 1.5 cm in greatest transverse dimension extending from its medial aspect along the floor of the maxillary antra to the pre maxillary soft tissues with surrounding induration and fat stranding.  Clinical correlation correlation with dental examination recommended. There is partial opacification within the mastoid air cells bilaterally greater on the right Trace asymmetrical roof of the ethmoids right being slightly higher than left.  Lamina papyracea grossly intact bilaterally. This report was flagged in Epic as abnormal.     Patchy  paranasal sinus disease with scattered opacities most pronounced in the floor of the right maxillary antra with mild moderate lobular opacity as detailed above.  There is superimposed prominent periapical cyst associated with the right maxillary 1st molar tooth cannot exclude component of odontogenic sinus disease. Please note that the periapical cyst associated with the right maxillary molar tooth extends into the pre maxillary soft tissues concerning for dental abscess formation.  Clinical correlation and correlation with dental examination recommended. Please see above for additional details. Electronically signed by: Jayme Martinez DO Date:    11/24/2020 Time:    16:50      Assessment  1. Chronic systolic heart failure  Compensated.  High risk for ischemic etiology    2. Essential hypertension  Control improving    3. Severe obesity  Unchanged    4. Type 2 diabetes mellitus with other circulatory complication, with long-term current use of insulin  Reports that his control is improving      Plan and Discussion    With his acute dental infection, he would like to defer angiography until the 1st of next month.  Will try carvedilol again once his dental infection is under control.     Follow Up  Follow up in about 4 weeks (around 12/31/2020).      Shemar Dempsey MD

## 2020-12-04 ENCOUNTER — OFFICE VISIT (OUTPATIENT)
Dept: RHEUMATOLOGY | Facility: CLINIC | Age: 65
End: 2020-12-04
Payer: MEDICARE

## 2020-12-04 DIAGNOSIS — Z51.81 ENCOUNTER FOR MONITORING OF SYSTEMIC STEROID THERAPY: ICD-10-CM

## 2020-12-04 DIAGNOSIS — Z79.52 ENCOUNTER FOR MONITORING OF SYSTEMIC STEROID THERAPY: ICD-10-CM

## 2020-12-04 DIAGNOSIS — Z79.620 ENCOUNTER FOR MONITORING OF ETANERCEPT THERAPY: Primary | ICD-10-CM

## 2020-12-04 DIAGNOSIS — Z51.81 ENCOUNTER FOR MONITORING OF ETANERCEPT THERAPY: Primary | ICD-10-CM

## 2020-12-04 DIAGNOSIS — M06.9 RHEUMATOID ARTHRITIS FLARE: ICD-10-CM

## 2020-12-04 PROCEDURE — 99214 OFFICE O/P EST MOD 30 MIN: CPT | Mod: 95,,, | Performed by: INTERNAL MEDICINE

## 2020-12-04 PROCEDURE — 99214 PR OFFICE/OUTPT VISIT, EST, LEVL IV, 30-39 MIN: ICD-10-PCS | Mod: 95,,, | Performed by: INTERNAL MEDICINE

## 2020-12-04 NOTE — PROGRESS NOTES
Subjective:       Patient ID: Nithin Wagner is a 64 y.o. male.    Chief Complaint: No chief complaint on file.    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   He was 53 when he was diagnosed with RA. He reports he reports he was having pain and swelling in knees and ankles. He then he had involvement in hands and shoulders. He was put on MTX.  He reports that the methotrexate did help with joints but it caused confusion so he stopped it for 2 years. He was taken aleve which helped his pain for 2 years.Then he developed HTN on NSAIDS so he stopped them.  He was then put on hydrocodone 5 QID to control his pain. Reports that warm weather improves his pain. He is back on methotrexate. Today, it will be his 5th dose. He is taking folic acid 1 mg po qday.  He has never tried up to 8 pills once a week. He is taking prednisone 10mg po BID for 4 weeks. He still has some pain in ankles and knees.  He does get mild swelling in knees and ankles. He was previously on prednisone 40mg a day. He smokes 1 pack per day for 40 years    family hx:cousin: connective tissue disease    Interval history:   He has two dental abscesses.  He is currently on clindamycin.  He is going to be on clindamycin for 14 days.  He stopped enbrel early November.   He was diagnosed with MARI. He is taking prednisone 20mg a day since getting off enbrel.   Denies history of ulcers or diverticulitis.      Past Medical History:   Diagnosis Date    Arthritis     Diabetes mellitus     Hyperlipidemia     Hypertension        Review of Systems      Review of Systems   Constitutional: Negative for fever.   Eyes: Negative for redness.   Respiratory: Negative for cough, hemoptysis, sputum production and shortness of breath.    Cardiovascular: Negative for chest pain.   Gastrointestinal: Negative for  constipation and diarrhea.   Musculoskeletal: Positive for joint pain.   Skin: Negative for rash.   Neurological: Negative for headaches.   Endo/Heme/Allergies: Does not bruise/bleed easily.         Objective:   There were no vitals taken for this visit.     Physical Exam   Constitutional: He is oriented to person, place, and time and well-developed, well-nourished, and in no distress. No distress.   HENT:   Head: Normocephalic and atraumatic.   Right Ear: External ear normal.   Eyes: Conjunctivae and EOM are normal. Pupils are equal, round, and reactive to light. Right eye exhibits no discharge. Left eye exhibits no discharge. No scleral icterus.   Neck: Normal range of motion. Neck supple. No JVD present. No tracheal deviation present. No thyromegaly present.   Cardiovascular: Normal rate and regular rhythm.    Pulmonary/Chest: Effort normal and breath sounds normal. No stridor. No respiratory distress. He has no wheezes. He has no rales. He exhibits no tenderness.   Abdominal: Soft. Bowel sounds are normal. He exhibits no distension and no mass. There is no tenderness. There is no rebound and no guarding.   Lymphadenopathy:     He has no cervical adenopathy.   Neurological: He is alert and oriented to person, place, and time. He displays normal reflexes. No cranial nerve deficit. He exhibits normal muscle tone. Coordination normal.   Skin: Skin is warm and dry. No rash noted. He is not diaphoretic. No erythema. No pallor.     Musculoskeletal: He exhibits edema and tenderness. He exhibits no deformity.      labs: reviewed  Results for CECILIA MEAD (MRN [id number]) as of 4/14/2020 12:44   Ref. Range 4/7/2020 13:52   CCP Antibodies Latest Ref Range: <5.0 U/mL 336.9 (H)   Rheumatoid Factor Latest Ref Range: 0.0 - 15.0 IU/mL 456.0 (H)       No data to display     Assessment:     65 year old with PMH of  Type II DM, HTN, ?gout, right atrial myxoma s/p surgery in 2018, PE, right upper lobectomy secondary to right atrial  myxoma, latent TB s/p INH x6 in 1982  here  For follow up of  rheumatoid arthritis. He was requiring high doses of steroid to function when we initially met.    Given that he has +hep B core antibody, he was started on lamivudine by hepatology but he had to stop due to GI upset  I took him off  methotrexate give his baseline thrombocytopenia, mild LFT elevation, and prior lung surgery.  He was doing good on enbrel but developed CHF.    He was doing better on enbrel but is currently on antibiotics due to possible sinusitis and  Infection of multiple teeth.  Enbrel now on hold. Patient self increased prednisone due to worsening joint pain off enbrel to 20mg a day from 5 mg a day.  He reports he will take 30mg a day for a few days given that he cannot get out of bed. I told patient this would likely make his infection worse.  Patient verbalized understanding.    Plan:       Problem List Items Addressed This Visit     None      No MTX given MARI and baseline thrombocytopenia  Continue folic acid 1 mg po qday   Off lamivudine per hepatology given GI upset   NO ENBREL given CHF  -steroids a above ( he will increase to 30mg a day for 4 days and then decreased back to 20mg a day)  (discussed side effects of long term steroids including weight gain, diabetes, cataracts, immunosuppression)  Educated patient on Covid virus and prevention strategies.  - consider  ACTEMRA pending results or orencia     No anti-tnf given CHF  No rosales kinase given history of PE    The patient location is:home    The chief complaint leading to consultation is:joint pain    Visit type: audiovisual    Face to Face time with patient: 30   minutes of total time spent on the encounter, which includes face to face time and non-face to face time preparing to see the patient (eg, review of tests), Obtaining and/or reviewing separately obtained history, Documenting clinical information in the electronic or other health record, Independently interpreting  results (not separately reported) and communicating results to the patient/family/caregiver, or Care coordination (not separately reported).         Each patient to whom he or she provides medical services by telemedicine is:  (1) informed of the relationship between the physician and patient and the respective role of any other health care provider with respect to management of the patient; and (2) notified that he or she may decline to receive medical services by telemedicine and may withdraw from such care at any time.    Notes:

## 2021-01-03 ENCOUNTER — PATIENT MESSAGE (OUTPATIENT)
Dept: CARDIOLOGY | Facility: CLINIC | Age: 66
End: 2021-01-03

## 2021-01-03 DIAGNOSIS — I50.22 CHRONIC SYSTOLIC HEART FAILURE: Primary | ICD-10-CM

## 2021-01-20 ENCOUNTER — OFFICE VISIT (OUTPATIENT)
Dept: RHEUMATOLOGY | Facility: CLINIC | Age: 66
End: 2021-01-20
Payer: MEDICARE

## 2021-01-20 DIAGNOSIS — Z79.899 ENCOUNTER FOR LONG-TERM CURRENT USE OF HIGH RISK MEDICATION: ICD-10-CM

## 2021-01-20 DIAGNOSIS — M06.9 RHEUMATOID ARTHRITIS FLARE: Primary | ICD-10-CM

## 2021-01-20 PROCEDURE — 99214 PR OFFICE/OUTPT VISIT, EST, LEVL IV, 30-39 MIN: ICD-10-PCS | Mod: 95,,, | Performed by: INTERNAL MEDICINE

## 2021-01-20 PROCEDURE — 99214 OFFICE O/P EST MOD 30 MIN: CPT | Mod: 95,,, | Performed by: INTERNAL MEDICINE

## 2021-01-20 ASSESSMENT — ROUTINE ASSESSMENT OF PATIENT INDEX DATA (RAPID3)
MDHAQ FUNCTION SCORE: 0.9
PAIN SCORE: 3
TOTAL RAPID3 SCORE: 3.5
WHEN YOU AWAKENED IN THE MORNING OVER THE LAST WEEK, PLEASE INDICATE THE AMOUNT OF TIME IT TAKES UNTIL YOU ARE AS LIMBER AS YOU WILL BE FOR THE DAY: 1 HOUR
PSYCHOLOGICAL DISTRESS SCORE: 4.4
FATIGUE SCORE: 6
PATIENT GLOBAL ASSESSMENT SCORE: 4.5
AM STIFFNESS SCORE: 1, YES

## 2021-02-04 ENCOUNTER — HOSPITAL ENCOUNTER (OUTPATIENT)
Dept: CARDIOLOGY | Facility: OTHER | Age: 66
Discharge: HOME | End: 2021-02-04
Attending: INTERNAL MEDICINE
Payer: MEDICARE

## 2021-02-04 VITALS
BODY MASS INDEX: 35.35 KG/M2 | DIASTOLIC BLOOD PRESSURE: 80 MMHG | WEIGHT: 261 LBS | SYSTOLIC BLOOD PRESSURE: 130 MMHG | HEIGHT: 72 IN

## 2021-02-04 DIAGNOSIS — I50.22 CHRONIC SYSTOLIC HEART FAILURE: ICD-10-CM

## 2021-02-04 PROCEDURE — 93306 TTE W/DOPPLER COMPLETE: CPT

## 2021-02-04 PROCEDURE — 93306 TTE W/DOPPLER COMPLETE: CPT | Mod: 26,,, | Performed by: INTERNAL MEDICINE

## 2021-02-04 PROCEDURE — 93306 ECHO (CUPID ONLY): ICD-10-PCS | Mod: 26,,, | Performed by: INTERNAL MEDICINE

## 2021-02-05 LAB
ASCENDING AORTA: 3.38 CM
AV INDEX (PROSTH): 1.03
AV MEAN GRADIENT: 3 MMHG
AV PEAK GRADIENT: 5 MMHG
AV VALVE AREA: 5.57 CM2
AV VELOCITY RATIO: 0.97
BSA FOR ECHO PROCEDURE: 2.45 M2
CV ECHO LV RWT: 0.44 CM
DOP CALC AO PEAK VEL: 1.09 M/S
DOP CALC AO VTI: 17.5 CM
DOP CALC LVOT AREA: 5.4 CM2
DOP CALC LVOT DIAMETER: 2.62 CM
DOP CALC LVOT PEAK VEL: 1.06 M/S
DOP CALC LVOT STROKE VOLUME: 97.43 CM3
DOP CALCLVOT PEAK VEL VTI: 18.08 CM
E WAVE DECELERATION TIME: 112.56 MSEC
E/A RATIO: 0.77
E/E' RATIO: 7.91 M/S
ECHO LV POSTERIOR WALL: 1.17 CM (ref 0.6–1.1)
FRACTIONAL SHORTENING: 27 % (ref 28–44)
INTERVENTRICULAR SEPTUM: 1.33 CM (ref 0.6–1.1)
IVRT: 71.51 MSEC
LA MAJOR: 5.33 CM
LA MINOR: 5.44 CM
LA WIDTH: 4.31 CM
LEFT ATRIUM SIZE: 4.02 CM
LEFT ATRIUM VOLUME INDEX MOD: 22 ML/M2
LEFT ATRIUM VOLUME INDEX: 33.2 ML/M2
LEFT ATRIUM VOLUME MOD: 52.5 CM3
LEFT ATRIUM VOLUME: 79.3 CM3
LEFT INTERNAL DIMENSION IN SYSTOLE: 3.9 CM (ref 2.1–4)
LEFT VENTRICLE DIASTOLIC VOLUME INDEX: 57.86 ML/M2
LEFT VENTRICLE DIASTOLIC VOLUME: 138.29 ML
LEFT VENTRICLE MASS INDEX: 115 G/M2
LEFT VENTRICLE SYSTOLIC VOLUME INDEX: 27.5 ML/M2
LEFT VENTRICLE SYSTOLIC VOLUME: 65.84 ML
LEFT VENTRICULAR INTERNAL DIMENSION IN DIASTOLE: 5.35 CM (ref 3.5–6)
LEFT VENTRICULAR MASS: 275.66 G
LV LATERAL E/E' RATIO: 9.67 M/S
LV SEPTAL E/E' RATIO: 6.69 M/S
MV PEAK A VEL: 1.13 M/S
MV PEAK E VEL: 0.87 M/S
PISA TR MAX VEL: 2.04 M/S
PV PEAK VELOCITY: 0.97 CM/S
RA MAJOR: 5.23 CM
RA PRESSURE: 3 MMHG
RIGHT VENTRICULAR END-DIASTOLIC DIMENSION: 2.45 CM
RV TISSUE DOPPLER FREE WALL SYSTOLIC VELOCITY 1 (APICAL 4 CHAMBER VIEW): 12.22 CM/S
SINUS: 4.43 CM
STJ: 3.13 CM
TDI LATERAL: 0.09 M/S
TDI SEPTAL: 0.13 M/S
TDI: 0.11 M/S
TR MAX PG: 17 MMHG
TRICUSPID ANNULAR PLANE SYSTOLIC EXCURSION: 1.87 CM
TV REST PULMONARY ARTERY PRESSURE: 20 MMHG

## 2021-02-08 ENCOUNTER — PATIENT MESSAGE (OUTPATIENT)
Dept: CARDIOLOGY | Facility: CLINIC | Age: 66
End: 2021-02-08

## 2021-02-22 ENCOUNTER — OFFICE VISIT (OUTPATIENT)
Dept: CARDIOLOGY | Facility: CLINIC | Age: 66
End: 2021-02-22
Payer: MEDICARE

## 2021-02-22 VITALS
HEART RATE: 97 BPM | WEIGHT: 266.31 LBS | DIASTOLIC BLOOD PRESSURE: 86 MMHG | SYSTOLIC BLOOD PRESSURE: 138 MMHG | BODY MASS INDEX: 36.12 KG/M2

## 2021-02-22 DIAGNOSIS — I50.23 ACUTE ON CHRONIC SYSTOLIC HEART FAILURE: ICD-10-CM

## 2021-02-22 DIAGNOSIS — E11.59 TYPE 2 DIABETES MELLITUS WITH OTHER CIRCULATORY COMPLICATION, WITH LONG-TERM CURRENT USE OF INSULIN: ICD-10-CM

## 2021-02-22 DIAGNOSIS — I10 ESSENTIAL HYPERTENSION: ICD-10-CM

## 2021-02-22 DIAGNOSIS — Z01.818 PREOP TESTING: Primary | ICD-10-CM

## 2021-02-22 DIAGNOSIS — E66.01 SEVERE OBESITY: ICD-10-CM

## 2021-02-22 DIAGNOSIS — Z79.4 TYPE 2 DIABETES MELLITUS WITH OTHER CIRCULATORY COMPLICATION, WITH LONG-TERM CURRENT USE OF INSULIN: ICD-10-CM

## 2021-02-22 PROCEDURE — 99214 PR OFFICE/OUTPT VISIT, EST, LEVL IV, 30-39 MIN: ICD-10-PCS | Mod: S$PBB,,, | Performed by: INTERNAL MEDICINE

## 2021-02-22 PROCEDURE — 99213 OFFICE O/P EST LOW 20 MIN: CPT | Mod: PBBFAC | Performed by: INTERNAL MEDICINE

## 2021-02-22 PROCEDURE — 99999 PR PBB SHADOW E&M-EST. PATIENT-LVL III: ICD-10-PCS | Mod: PBBFAC,,, | Performed by: INTERNAL MEDICINE

## 2021-02-22 PROCEDURE — 99214 OFFICE O/P EST MOD 30 MIN: CPT | Mod: S$PBB,,, | Performed by: INTERNAL MEDICINE

## 2021-02-22 PROCEDURE — 99999 PR PBB SHADOW E&M-EST. PATIENT-LVL III: CPT | Mod: PBBFAC,,, | Performed by: INTERNAL MEDICINE

## 2021-02-22 RX ORDER — SODIUM CHLORIDE 9 MG/ML
INJECTION, SOLUTION INTRAVENOUS CONTINUOUS
Status: CANCELLED | OUTPATIENT
Start: 2021-02-22

## 2021-02-22 RX ORDER — DIAZEPAM 2 MG/1
5 TABLET ORAL
Status: CANCELLED | OUTPATIENT
Start: 2021-02-22 | End: 2021-02-22

## 2021-02-22 RX ORDER — DIPHENHYDRAMINE HCL 25 MG
25 CAPSULE ORAL
Status: CANCELLED | OUTPATIENT
Start: 2021-02-22 | End: 2021-02-23

## 2021-02-23 ENCOUNTER — PATIENT MESSAGE (OUTPATIENT)
Dept: RHEUMATOLOGY | Facility: CLINIC | Age: 66
End: 2021-02-23

## 2021-03-07 ENCOUNTER — LAB VISIT (OUTPATIENT)
Dept: URGENT CARE | Facility: CLINIC | Age: 66
End: 2021-03-07
Payer: MEDICARE

## 2021-03-07 DIAGNOSIS — Z01.818 PREOP TESTING: ICD-10-CM

## 2021-03-07 PROCEDURE — U0003 INFECTIOUS AGENT DETECTION BY NUCLEIC ACID (DNA OR RNA); SEVERE ACUTE RESPIRATORY SYNDROME CORONAVIRUS 2 (SARS-COV-2) (CORONAVIRUS DISEASE [COVID-19]), AMPLIFIED PROBE TECHNIQUE, MAKING USE OF HIGH THROUGHPUT TECHNOLOGIES AS DESCRIBED BY CMS-2020-01-R: HCPCS | Performed by: INTERNAL MEDICINE

## 2021-03-07 PROCEDURE — 99211 PR OFFICE/OUTPT VISIT, EST, LEVL I: ICD-10-PCS | Mod: S$GLB,,, | Performed by: NURSE PRACTITIONER

## 2021-03-07 PROCEDURE — 99211 OFF/OP EST MAY X REQ PHY/QHP: CPT | Mod: S$GLB,,, | Performed by: NURSE PRACTITIONER

## 2021-03-07 PROCEDURE — U0005 INFEC AGEN DETEC AMPLI PROBE: HCPCS | Performed by: INTERNAL MEDICINE

## 2021-03-08 ENCOUNTER — HOSPITAL ENCOUNTER (OUTPATIENT)
Dept: PREADMISSION TESTING | Facility: OTHER | Age: 66
Discharge: HOME | End: 2021-03-08
Attending: INTERNAL MEDICINE
Payer: MEDICARE

## 2021-03-08 ENCOUNTER — PATIENT MESSAGE (OUTPATIENT)
Dept: RHEUMATOLOGY | Facility: CLINIC | Age: 66
End: 2021-03-08

## 2021-03-08 VITALS
HEART RATE: 103 BPM | TEMPERATURE: 98 F | HEIGHT: 72 IN | WEIGHT: 264 LBS | BODY MASS INDEX: 35.76 KG/M2 | DIASTOLIC BLOOD PRESSURE: 94 MMHG | SYSTOLIC BLOOD PRESSURE: 159 MMHG | OXYGEN SATURATION: 93 %

## 2021-03-08 LAB — SARS-COV-2 RNA RESP QL NAA+PROBE: NOT DETECTED

## 2021-03-08 RX ORDER — MULTIVITAMIN
1 TABLET ORAL DAILY
COMMUNITY

## 2021-03-08 RX ORDER — VALACYCLOVIR HYDROCHLORIDE 1 G/1
TABLET, FILM COATED ORAL
COMMUNITY
Start: 2021-03-02 | End: 2021-08-09

## 2021-03-08 RX ORDER — PREDNISONE 5 MG/1
10 TABLET ORAL DAILY
Qty: 60 TABLET | Refills: 3 | Status: SHIPPED | OUTPATIENT
Start: 2021-03-08 | End: 2024-01-05 | Stop reason: SDUPTHER

## 2021-03-10 ENCOUNTER — HOSPITAL ENCOUNTER (OUTPATIENT)
Facility: OTHER | Age: 66
Discharge: HOME | End: 2021-03-10
Attending: INTERNAL MEDICINE | Admitting: INTERNAL MEDICINE
Payer: MEDICARE

## 2021-03-10 VITALS
WEIGHT: 264 LBS | BODY MASS INDEX: 35.76 KG/M2 | HEIGHT: 72 IN | RESPIRATION RATE: 12 BRPM | DIASTOLIC BLOOD PRESSURE: 98 MMHG | SYSTOLIC BLOOD PRESSURE: 157 MMHG | OXYGEN SATURATION: 94 % | TEMPERATURE: 98 F | HEART RATE: 98 BPM

## 2021-03-10 DIAGNOSIS — I25.10 CORONARY ATHEROSCLEROSIS: ICD-10-CM

## 2021-03-10 DIAGNOSIS — I50.23 ACUTE ON CHRONIC SYSTOLIC HEART FAILURE: ICD-10-CM

## 2021-03-10 DIAGNOSIS — Z01.818 PREOP TESTING: ICD-10-CM

## 2021-03-10 DIAGNOSIS — F17.200 SMOKER UNMOTIVATED TO QUIT: ICD-10-CM

## 2021-03-10 DIAGNOSIS — I10 ESSENTIAL HYPERTENSION: ICD-10-CM

## 2021-03-10 DIAGNOSIS — Z79.4 TYPE 2 DIABETES MELLITUS WITH HYPERGLYCEMIA, WITH LONG-TERM CURRENT USE OF INSULIN: ICD-10-CM

## 2021-03-10 DIAGNOSIS — K12.0 CANKER SORES ORAL: ICD-10-CM

## 2021-03-10 DIAGNOSIS — E11.65 TYPE 2 DIABETES MELLITUS WITH HYPERGLYCEMIA, WITH LONG-TERM CURRENT USE OF INSULIN: ICD-10-CM

## 2021-03-10 DIAGNOSIS — D75.1 POLYCYTHEMIA, SECONDARY: ICD-10-CM

## 2021-03-10 LAB
ANION GAP SERPL CALC-SCNC: 12 MMOL/L (ref 8–16)
BASOPHILS # BLD AUTO: 0.07 K/UL (ref 0–0.2)
BASOPHILS NFR BLD: 0.8 % (ref 0–1.9)
BUN SERPL-MCNC: 13 MG/DL (ref 8–23)
CALCIUM SERPL-MCNC: 9.5 MG/DL (ref 8.7–10.5)
CHLORIDE SERPL-SCNC: 106 MMOL/L (ref 95–110)
CK MB SERPL-MCNC: 4.1 NG/ML (ref 0.1–6.5)
CK MB SERPL-RTO: 4.6 % (ref 0–5)
CK SERPL-CCNC: 89 U/L (ref 20–200)
CO2 SERPL-SCNC: 24 MMOL/L (ref 23–29)
CREAT SERPL-MCNC: 1 MG/DL (ref 0.5–1.4)
DIFFERENTIAL METHOD: ABNORMAL
EOSINOPHIL # BLD AUTO: 0.2 K/UL (ref 0–0.5)
EOSINOPHIL NFR BLD: 2 % (ref 0–8)
ERYTHROCYTE [DISTWIDTH] IN BLOOD BY AUTOMATED COUNT: 13.1 % (ref 11.5–14.5)
EST. GFR  (AFRICAN AMERICAN): >60 ML/MIN/1.73 M^2
EST. GFR  (NON AFRICAN AMERICAN): >60 ML/MIN/1.73 M^2
ESTIMATED AVG GLUCOSE: 171 MG/DL (ref 68–131)
GLUCOSE SERPL-MCNC: 144 MG/DL (ref 70–110)
HBA1C MFR BLD: 7.6 % (ref 4–5.6)
HCT VFR BLD AUTO: 55.8 % (ref 40–54)
HGB BLD-MCNC: 18.5 G/DL (ref 14–18)
IMM GRANULOCYTES # BLD AUTO: 0.05 K/UL (ref 0–0.04)
IMM GRANULOCYTES NFR BLD AUTO: 0.6 % (ref 0–0.5)
INR PPP: 0.9 (ref 0.8–1.2)
LYMPHOCYTES # BLD AUTO: 3 K/UL (ref 1–4.8)
LYMPHOCYTES NFR BLD: 33.5 % (ref 18–48)
MCH RBC QN AUTO: 31.6 PG (ref 27–31)
MCHC RBC AUTO-ENTMCNC: 33.2 G/DL (ref 32–36)
MCV RBC AUTO: 95 FL (ref 82–98)
MONOCYTES # BLD AUTO: 0.7 K/UL (ref 0.3–1)
MONOCYTES NFR BLD: 7.8 % (ref 4–15)
NEUTROPHILS # BLD AUTO: 5 K/UL (ref 1.8–7.7)
NEUTROPHILS NFR BLD: 55.3 % (ref 38–73)
NRBC BLD-RTO: 0 /100 WBC
PLATELET # BLD AUTO: 170 K/UL (ref 150–350)
PMV BLD AUTO: 11.4 FL (ref 9.2–12.9)
POCT GLUCOSE: 135 MG/DL (ref 70–110)
POCT GLUCOSE: 208 MG/DL (ref 70–110)
POCT GLUCOSE: 235 MG/DL (ref 70–110)
POTASSIUM SERPL-SCNC: 4.3 MMOL/L (ref 3.5–5.1)
PROTHROMBIN TIME: 10.3 SEC (ref 9–12.5)
RBC # BLD AUTO: 5.85 M/UL (ref 4.6–6.2)
SODIUM SERPL-SCNC: 142 MMOL/L (ref 136–145)
TROPONIN I SERPL DL<=0.01 NG/ML-MCNC: 0.02 NG/ML (ref 0–0.03)
TROPONIN I SERPL DL<=0.01 NG/ML-MCNC: 1.01 NG/ML (ref 0–0.03)
WBC # BLD AUTO: 8.95 K/UL (ref 3.9–12.7)

## 2021-03-10 PROCEDURE — C9600 PERC DRUG-EL COR STENT SING: HCPCS | Mod: RC | Performed by: INTERNAL MEDICINE

## 2021-03-10 PROCEDURE — 25000003 PHARM REV CODE 250: Performed by: INTERNAL MEDICINE

## 2021-03-10 PROCEDURE — 83036 HEMOGLOBIN GLYCOSYLATED A1C: CPT | Performed by: HOSPITALIST

## 2021-03-10 PROCEDURE — C1725 CATH, TRANSLUMIN NON-LASER: HCPCS | Performed by: INTERNAL MEDICINE

## 2021-03-10 PROCEDURE — 93005 ELECTROCARDIOGRAM TRACING: CPT

## 2021-03-10 PROCEDURE — 92928 PR STENT: ICD-10-PCS | Mod: RC,,, | Performed by: INTERNAL MEDICINE

## 2021-03-10 PROCEDURE — 99152 MOD SED SAME PHYS/QHP 5/>YRS: CPT | Performed by: INTERNAL MEDICINE

## 2021-03-10 PROCEDURE — C1887 CATHETER, GUIDING: HCPCS | Performed by: INTERNAL MEDICINE

## 2021-03-10 PROCEDURE — 99152 MOD SED SAME PHYS/QHP 5/>YRS: CPT | Mod: ,,, | Performed by: INTERNAL MEDICINE

## 2021-03-10 PROCEDURE — 93454 PR CATH PLACE/CORONARY ANGIO, IMG SUPER/INTERP: ICD-10-PCS | Mod: 26,59,51, | Performed by: INTERNAL MEDICINE

## 2021-03-10 PROCEDURE — 92921 HC PTCA , ADD'L BRANCH: CPT | Mod: RC | Performed by: INTERNAL MEDICINE

## 2021-03-10 PROCEDURE — C1769 GUIDE WIRE: HCPCS | Performed by: INTERNAL MEDICINE

## 2021-03-10 PROCEDURE — C1894 INTRO/SHEATH, NON-LASER: HCPCS | Performed by: INTERNAL MEDICINE

## 2021-03-10 PROCEDURE — 85610 PROTHROMBIN TIME: CPT | Performed by: INTERNAL MEDICINE

## 2021-03-10 PROCEDURE — 99153 MOD SED SAME PHYS/QHP EA: CPT | Performed by: INTERNAL MEDICINE

## 2021-03-10 PROCEDURE — 93454 CORONARY ARTERY ANGIO S&I: CPT | Mod: 59 | Performed by: INTERNAL MEDICINE

## 2021-03-10 PROCEDURE — 36415 COLL VENOUS BLD VENIPUNCTURE: CPT | Performed by: INTERNAL MEDICINE

## 2021-03-10 PROCEDURE — 99152 PR MOD CONSCIOUS SEDATION, SAME PHYS, 5+ YRS, FIRST 15 MIN: ICD-10-PCS | Mod: ,,, | Performed by: INTERNAL MEDICINE

## 2021-03-10 PROCEDURE — 92928 PRQ TCAT PLMT NTRAC ST 1 LES: CPT | Mod: RC,,, | Performed by: INTERNAL MEDICINE

## 2021-03-10 PROCEDURE — 94761 N-INVAS EAR/PLS OXIMETRY MLT: CPT | Mod: 59

## 2021-03-10 PROCEDURE — 85025 COMPLETE CBC W/AUTO DIFF WBC: CPT | Performed by: INTERNAL MEDICINE

## 2021-03-10 PROCEDURE — 92921 PR PTCA, ADD'L VESSEL: CPT | Mod: RC,,, | Performed by: INTERNAL MEDICINE

## 2021-03-10 PROCEDURE — 99220 PR INITIAL OBSERVATION CARE,LEVL III: CPT | Mod: ,,, | Performed by: HOSPITALIST

## 2021-03-10 PROCEDURE — C1874 STENT, COATED/COV W/DEL SYS: HCPCS | Performed by: INTERNAL MEDICINE

## 2021-03-10 PROCEDURE — 92921 PR PTCA, ADD'L VESSEL: ICD-10-PCS | Mod: RC,,, | Performed by: INTERNAL MEDICINE

## 2021-03-10 PROCEDURE — 93454 CORONARY ARTERY ANGIO S&I: CPT | Mod: 26,59,51, | Performed by: INTERNAL MEDICINE

## 2021-03-10 PROCEDURE — 63600175 PHARM REV CODE 636 W HCPCS: Performed by: INTERNAL MEDICINE

## 2021-03-10 PROCEDURE — 84484 ASSAY OF TROPONIN QUANT: CPT | Performed by: INTERNAL MEDICINE

## 2021-03-10 PROCEDURE — 80048 BASIC METABOLIC PNL TOTAL CA: CPT | Performed by: INTERNAL MEDICINE

## 2021-03-10 PROCEDURE — 82553 CREATINE MB FRACTION: CPT | Performed by: INTERNAL MEDICINE

## 2021-03-10 PROCEDURE — 93010 ELECTROCARDIOGRAM REPORT: CPT | Mod: ,,, | Performed by: INTERNAL MEDICINE

## 2021-03-10 PROCEDURE — 63600175 PHARM REV CODE 636 W HCPCS: Performed by: HOSPITALIST

## 2021-03-10 PROCEDURE — 99220 PR INITIAL OBSERVATION CARE,LEVL III: ICD-10-PCS | Mod: ,,, | Performed by: HOSPITALIST

## 2021-03-10 PROCEDURE — 82550 ASSAY OF CK (CPK): CPT | Performed by: INTERNAL MEDICINE

## 2021-03-10 PROCEDURE — 93010 EKG 12-LEAD: ICD-10-PCS | Mod: ,,, | Performed by: INTERNAL MEDICINE

## 2021-03-10 DEVICE — STENT RONYX45026UX RESOLUTE ONYX 4.50X26
Type: IMPLANTABLE DEVICE | Site: HEART | Status: FUNCTIONAL
Brand: RESOLUTE ONYX™

## 2021-03-10 DEVICE — STENT RONYX45012UX RESOLUTE ONYX 4.50X12
Type: IMPLANTABLE DEVICE | Site: HEART | Status: FUNCTIONAL
Brand: RESOLUTE ONYX™

## 2021-03-10 RX ORDER — HYDROCODONE BITARTRATE AND ACETAMINOPHEN 5; 325 MG/1; MG/1
1 TABLET ORAL EVERY 4 HOURS PRN
Status: DISCONTINUED | OUTPATIENT
Start: 2021-03-10 | End: 2021-03-10 | Stop reason: HOSPADM

## 2021-03-10 RX ORDER — SODIUM CHLORIDE 9 MG/ML
INJECTION, SOLUTION INTRAVENOUS CONTINUOUS
Status: ACTIVE | OUTPATIENT
Start: 2021-03-10 | End: 2021-03-10

## 2021-03-10 RX ORDER — DIAZEPAM 5 MG/1
5 TABLET ORAL
Status: COMPLETED | OUTPATIENT
Start: 2021-03-10 | End: 2021-03-10

## 2021-03-10 RX ORDER — CLOPIDOGREL BISULFATE 75 MG/1
75 TABLET ORAL DAILY
Qty: 90 TABLET | Refills: 3 | Status: SHIPPED | OUTPATIENT
Start: 2021-03-10 | End: 2022-03-15

## 2021-03-10 RX ORDER — PREDNISONE 20 MG/1
20 TABLET ORAL DAILY
Status: DISCONTINUED | OUTPATIENT
Start: 2021-03-10 | End: 2021-03-10 | Stop reason: HOSPADM

## 2021-03-10 RX ORDER — ASPIRIN 325 MG
TABLET ORAL
Status: DISCONTINUED | OUTPATIENT
Start: 2021-03-10 | End: 2021-03-10 | Stop reason: HOSPADM

## 2021-03-10 RX ORDER — MORPHINE SULFATE 10 MG/ML
3 INJECTION INTRAMUSCULAR; INTRAVENOUS; SUBCUTANEOUS
Status: DISCONTINUED | OUTPATIENT
Start: 2021-03-10 | End: 2021-03-10 | Stop reason: HOSPADM

## 2021-03-10 RX ORDER — DIPHENHYDRAMINE HCL 25 MG
25 CAPSULE ORAL
Status: COMPLETED | OUTPATIENT
Start: 2021-03-10 | End: 2021-03-10

## 2021-03-10 RX ORDER — ESCITALOPRAM OXALATE 10 MG/1
20 TABLET ORAL NIGHTLY
Status: DISCONTINUED | OUTPATIENT
Start: 2021-03-10 | End: 2021-03-10 | Stop reason: HOSPADM

## 2021-03-10 RX ORDER — LIDOCAINE HYDROCHLORIDE 10 MG/ML
INJECTION, SOLUTION EPIDURAL; INFILTRATION; INTRACAUDAL; PERINEURAL
Status: DISCONTINUED | OUTPATIENT
Start: 2021-03-10 | End: 2021-03-10 | Stop reason: HOSPADM

## 2021-03-10 RX ORDER — ONDANSETRON 8 MG/1
8 TABLET, ORALLY DISINTEGRATING ORAL EVERY 8 HOURS PRN
Status: DISCONTINUED | OUTPATIENT
Start: 2021-03-10 | End: 2021-03-10 | Stop reason: HOSPADM

## 2021-03-10 RX ORDER — VERAPAMIL HYDROCHLORIDE 2.5 MG/ML
INJECTION, SOLUTION INTRAVENOUS
Status: DISCONTINUED | OUTPATIENT
Start: 2021-03-10 | End: 2021-03-10 | Stop reason: HOSPADM

## 2021-03-10 RX ORDER — INSULIN ASPART 100 [IU]/ML
20 INJECTION, SOLUTION INTRAVENOUS; SUBCUTANEOUS
Status: DISCONTINUED | OUTPATIENT
Start: 2021-03-10 | End: 2021-03-10 | Stop reason: HOSPADM

## 2021-03-10 RX ORDER — FENTANYL CITRATE 50 UG/ML
INJECTION, SOLUTION INTRAMUSCULAR; INTRAVENOUS
Status: DISCONTINUED | OUTPATIENT
Start: 2021-03-10 | End: 2021-03-10 | Stop reason: HOSPADM

## 2021-03-10 RX ORDER — ACETAMINOPHEN 325 MG/1
650 TABLET ORAL EVERY 4 HOURS PRN
Status: DISCONTINUED | OUTPATIENT
Start: 2021-03-10 | End: 2021-03-10 | Stop reason: HOSPADM

## 2021-03-10 RX ORDER — ESCITALOPRAM OXALATE 10 MG/1
20 TABLET ORAL DAILY
Status: DISCONTINUED | OUTPATIENT
Start: 2021-03-10 | End: 2021-03-10

## 2021-03-10 RX ORDER — SUCRALFATE 1 G/10ML
1 SUSPENSION ORAL EVERY 6 HOURS
Status: DISCONTINUED | OUTPATIENT
Start: 2021-03-10 | End: 2021-03-10 | Stop reason: HOSPADM

## 2021-03-10 RX ORDER — ASPIRIN 81 MG/1
81 TABLET ORAL DAILY
Status: DISCONTINUED | OUTPATIENT
Start: 2021-03-10 | End: 2021-03-10 | Stop reason: HOSPADM

## 2021-03-10 RX ORDER — IBUPROFEN 200 MG
24 TABLET ORAL
Status: DISCONTINUED | OUTPATIENT
Start: 2021-03-10 | End: 2021-03-10 | Stop reason: HOSPADM

## 2021-03-10 RX ORDER — HEPARIN SODIUM 5000 [USP'U]/ML
5000 INJECTION, SOLUTION INTRAVENOUS; SUBCUTANEOUS EVERY 8 HOURS
Status: DISCONTINUED | OUTPATIENT
Start: 2021-03-10 | End: 2021-03-10 | Stop reason: HOSPADM

## 2021-03-10 RX ORDER — MIDAZOLAM HYDROCHLORIDE 1 MG/ML
INJECTION INTRAMUSCULAR; INTRAVENOUS
Status: DISCONTINUED | OUTPATIENT
Start: 2021-03-10 | End: 2021-03-10 | Stop reason: HOSPADM

## 2021-03-10 RX ORDER — LOSARTAN POTASSIUM 50 MG/1
50 TABLET ORAL EVERY 12 HOURS
Status: DISCONTINUED | OUTPATIENT
Start: 2021-03-10 | End: 2021-03-10 | Stop reason: HOSPADM

## 2021-03-10 RX ORDER — NYSTATIN 100000 [USP'U]/ML
500000 SUSPENSION ORAL
Status: DISCONTINUED | OUTPATIENT
Start: 2021-03-10 | End: 2021-03-10 | Stop reason: HOSPADM

## 2021-03-10 RX ORDER — SODIUM CHLORIDE 9 MG/ML
INJECTION, SOLUTION INTRAVENOUS CONTINUOUS
Status: DISCONTINUED | OUTPATIENT
Start: 2021-03-10 | End: 2021-03-10

## 2021-03-10 RX ORDER — IBUPROFEN 200 MG
16 TABLET ORAL
Status: DISCONTINUED | OUTPATIENT
Start: 2021-03-10 | End: 2021-03-10 | Stop reason: HOSPADM

## 2021-03-10 RX ORDER — GLUCAGON 1 MG
1 KIT INJECTION
Status: DISCONTINUED | OUTPATIENT
Start: 2021-03-10 | End: 2021-03-10 | Stop reason: HOSPADM

## 2021-03-10 RX ORDER — INSULIN ASPART 100 [IU]/ML
1-10 INJECTION, SOLUTION INTRAVENOUS; SUBCUTANEOUS
Status: DISCONTINUED | OUTPATIENT
Start: 2021-03-10 | End: 2021-03-10 | Stop reason: HOSPADM

## 2021-03-10 RX ORDER — NITROGLYCERIN 5 MG/ML
INJECTION, SOLUTION INTRAVENOUS
Status: DISCONTINUED | OUTPATIENT
Start: 2021-03-10 | End: 2021-03-10 | Stop reason: HOSPADM

## 2021-03-10 RX ORDER — PREDNISONE 5 MG/1
10 TABLET ORAL DAILY
Status: DISCONTINUED | OUTPATIENT
Start: 2021-03-10 | End: 2021-03-10

## 2021-03-10 RX ORDER — DIAZEPAM 5 MG/1
5 TABLET ORAL
Status: DISCONTINUED | OUTPATIENT
Start: 2021-03-10 | End: 2021-03-10 | Stop reason: SDUPTHER

## 2021-03-10 RX ORDER — CLOPIDOGREL BISULFATE 75 MG/1
75 TABLET ORAL DAILY
Status: DISCONTINUED | OUTPATIENT
Start: 2021-03-10 | End: 2021-03-10 | Stop reason: HOSPADM

## 2021-03-10 RX ORDER — HEPARIN SODIUM 1000 [USP'U]/ML
INJECTION, SOLUTION INTRAVENOUS; SUBCUTANEOUS
Status: DISCONTINUED | OUTPATIENT
Start: 2021-03-10 | End: 2021-03-10 | Stop reason: HOSPADM

## 2021-03-10 RX ORDER — CLOPIDOGREL BISULFATE 75 MG/1
TABLET ORAL
Status: DISCONTINUED | OUTPATIENT
Start: 2021-03-10 | End: 2021-03-10 | Stop reason: HOSPADM

## 2021-03-10 RX ADMIN — LOSARTAN POTASSIUM 50 MG: 50 TABLET, FILM COATED ORAL at 11:03

## 2021-03-10 RX ADMIN — HEPARIN SODIUM 5000 UNITS: 5000 INJECTION INTRAVENOUS; SUBCUTANEOUS at 02:03

## 2021-03-10 RX ADMIN — DIPHENHYDRAMINE HYDROCHLORIDE 25 MG: 25 CAPSULE ORAL at 07:03

## 2021-03-10 RX ADMIN — SODIUM CHLORIDE: 0.9 INJECTION, SOLUTION INTRAVENOUS at 07:03

## 2021-03-10 RX ADMIN — INSULIN ASPART 4 UNITS: 100 INJECTION, SOLUTION INTRAVENOUS; SUBCUTANEOUS at 11:03

## 2021-03-10 RX ADMIN — INSULIN ASPART 4 UNITS: 100 INJECTION, SOLUTION INTRAVENOUS; SUBCUTANEOUS at 04:03

## 2021-03-10 RX ADMIN — INSULIN ASPART 20 UNITS: 100 INJECTION, SOLUTION INTRAVENOUS; SUBCUTANEOUS at 04:03

## 2021-03-10 RX ADMIN — PREDNISONE 20 MG: 20 TABLET ORAL at 11:03

## 2021-03-10 RX ADMIN — DIAZEPAM 5 MG: 5 TABLET ORAL at 07:03

## 2021-03-11 ENCOUNTER — PATIENT MESSAGE (OUTPATIENT)
Dept: CARDIOLOGY | Facility: CLINIC | Age: 66
End: 2021-03-11

## 2021-03-11 LAB
POC ACTIVATED CLOTTING TIME K: 241 SEC (ref 74–137)
SAMPLE: ABNORMAL

## 2021-03-22 ENCOUNTER — PATIENT MESSAGE (OUTPATIENT)
Dept: CARDIOLOGY | Facility: CLINIC | Age: 66
End: 2021-03-22

## 2021-03-23 ENCOUNTER — OFFICE VISIT (OUTPATIENT)
Dept: CARDIOLOGY | Facility: CLINIC | Age: 66
End: 2021-03-23
Payer: MEDICARE

## 2021-03-23 VITALS
DIASTOLIC BLOOD PRESSURE: 80 MMHG | HEART RATE: 103 BPM | BODY MASS INDEX: 35.82 KG/M2 | SYSTOLIC BLOOD PRESSURE: 136 MMHG | WEIGHT: 264.13 LBS

## 2021-03-23 DIAGNOSIS — E66.01 SEVERE OBESITY: ICD-10-CM

## 2021-03-23 DIAGNOSIS — I25.118 ATHEROSCLEROSIS OF NATIVE CORONARY ARTERY OF NATIVE HEART WITH STABLE ANGINA PECTORIS: ICD-10-CM

## 2021-03-23 DIAGNOSIS — E11.65 TYPE 2 DIABETES MELLITUS WITH HYPERGLYCEMIA, WITH LONG-TERM CURRENT USE OF INSULIN: ICD-10-CM

## 2021-03-23 DIAGNOSIS — I10 ESSENTIAL HYPERTENSION: ICD-10-CM

## 2021-03-23 DIAGNOSIS — Z79.4 TYPE 2 DIABETES MELLITUS WITH HYPERGLYCEMIA, WITH LONG-TERM CURRENT USE OF INSULIN: ICD-10-CM

## 2021-03-23 DIAGNOSIS — I50.23 ACUTE ON CHRONIC SYSTOLIC HEART FAILURE: Primary | ICD-10-CM

## 2021-03-23 PROBLEM — I25.10 CORONARY ATHEROSCLEROSIS: Status: ACTIVE | Noted: 2021-03-23

## 2021-03-23 PROBLEM — Z01.818 PREOP TESTING: Status: RESOLVED | Noted: 2021-03-10 | Resolved: 2021-03-23

## 2021-03-23 PROCEDURE — 99215 PR OFFICE/OUTPT VISIT, EST, LEVL V, 40-54 MIN: ICD-10-PCS | Mod: S$PBB,,, | Performed by: INTERNAL MEDICINE

## 2021-03-23 PROCEDURE — 99999 PR PBB SHADOW E&M-EST. PATIENT-LVL IV: CPT | Mod: PBBFAC,,, | Performed by: INTERNAL MEDICINE

## 2021-03-23 PROCEDURE — 99215 OFFICE O/P EST HI 40 MIN: CPT | Mod: S$PBB,,, | Performed by: INTERNAL MEDICINE

## 2021-03-23 PROCEDURE — 99214 OFFICE O/P EST MOD 30 MIN: CPT | Mod: PBBFAC | Performed by: INTERNAL MEDICINE

## 2021-03-23 PROCEDURE — 99999 PR PBB SHADOW E&M-EST. PATIENT-LVL IV: ICD-10-PCS | Mod: PBBFAC,,, | Performed by: INTERNAL MEDICINE

## 2021-03-23 RX ORDER — NITROGLYCERIN 0.4 MG/1
0.4 TABLET SUBLINGUAL EVERY 5 MIN PRN
Qty: 25 TABLET | Refills: 11 | Status: SHIPPED | OUTPATIENT
Start: 2021-03-23 | End: 2022-04-17 | Stop reason: SDUPTHER

## 2021-04-19 ENCOUNTER — PATIENT MESSAGE (OUTPATIENT)
Dept: RHEUMATOLOGY | Facility: CLINIC | Age: 66
End: 2021-04-19

## 2021-04-20 ENCOUNTER — OFFICE VISIT (OUTPATIENT)
Dept: CARDIOLOGY | Facility: CLINIC | Age: 66
End: 2021-04-20
Payer: MEDICARE

## 2021-04-20 ENCOUNTER — CLINICAL SUPPORT (OUTPATIENT)
Dept: DIABETES | Facility: CLINIC | Age: 66
End: 2021-04-20
Payer: MEDICARE

## 2021-04-20 VITALS
WEIGHT: 268.94 LBS | BODY MASS INDEX: 36.48 KG/M2 | SYSTOLIC BLOOD PRESSURE: 140 MMHG | DIASTOLIC BLOOD PRESSURE: 92 MMHG

## 2021-04-20 VITALS — HEIGHT: 72 IN | WEIGHT: 268.94 LBS | BODY MASS INDEX: 36.43 KG/M2

## 2021-04-20 DIAGNOSIS — I25.118 ATHEROSCLEROSIS OF NATIVE CORONARY ARTERY OF NATIVE HEART WITH STABLE ANGINA PECTORIS: ICD-10-CM

## 2021-04-20 DIAGNOSIS — I25.118 ATHEROSCLEROSIS OF NATIVE CORONARY ARTERY OF NATIVE HEART WITH STABLE ANGINA PECTORIS: Primary | ICD-10-CM

## 2021-04-20 DIAGNOSIS — Z12.5 SCREENING FOR PROSTATE CANCER: ICD-10-CM

## 2021-04-20 DIAGNOSIS — I50.22 CHRONIC SYSTOLIC HEART FAILURE: ICD-10-CM

## 2021-04-20 DIAGNOSIS — E66.01 SEVERE OBESITY: ICD-10-CM

## 2021-04-20 DIAGNOSIS — E11.65 UNCONTROLLED TYPE 2 DIABETES MELLITUS WITH HYPERGLYCEMIA: ICD-10-CM

## 2021-04-20 DIAGNOSIS — Z79.52 ON PREDNISONE THERAPY: ICD-10-CM

## 2021-04-20 DIAGNOSIS — Z79.4 TYPE 2 DIABETES MELLITUS WITH HYPERGLYCEMIA, WITH LONG-TERM CURRENT USE OF INSULIN: ICD-10-CM

## 2021-04-20 DIAGNOSIS — I10 ESSENTIAL HYPERTENSION: ICD-10-CM

## 2021-04-20 DIAGNOSIS — J44.9 CHRONIC OBSTRUCTIVE PULMONARY DISEASE, UNSPECIFIED COPD TYPE: ICD-10-CM

## 2021-04-20 DIAGNOSIS — G93.32 CHRONIC FATIGUE SYNDROME: Primary | ICD-10-CM

## 2021-04-20 DIAGNOSIS — E11.65 TYPE 2 DIABETES MELLITUS WITH HYPERGLYCEMIA, WITH LONG-TERM CURRENT USE OF INSULIN: ICD-10-CM

## 2021-04-20 PROCEDURE — 99213 OFFICE O/P EST LOW 20 MIN: CPT | Mod: PBBFAC,27 | Performed by: INTERNAL MEDICINE

## 2021-04-20 PROCEDURE — 99215 OFFICE O/P EST HI 40 MIN: CPT | Mod: S$PBB,,, | Performed by: INTERNAL MEDICINE

## 2021-04-20 PROCEDURE — 99999 PR PBB SHADOW E&M-EST. PATIENT-LVL III: ICD-10-PCS | Mod: PBBFAC,,, | Performed by: INTERNAL MEDICINE

## 2021-04-20 PROCEDURE — 99999 PR PBB SHADOW E&M-EST. PATIENT-LVL III: CPT | Mod: PBBFAC,,, | Performed by: INTERNAL MEDICINE

## 2021-04-20 PROCEDURE — 99213 OFFICE O/P EST LOW 20 MIN: CPT | Mod: PBBFAC,27

## 2021-04-20 PROCEDURE — 99999 PR PBB SHADOW E&M-EST. PATIENT-LVL III: ICD-10-PCS | Mod: PBBFAC,,,

## 2021-04-20 PROCEDURE — 99999 PR PBB SHADOW E&M-EST. PATIENT-LVL III: CPT | Mod: PBBFAC,,,

## 2021-04-20 PROCEDURE — G0108 DIAB MANAGE TRN  PER INDIV: HCPCS | Mod: PBBFAC

## 2021-04-20 PROCEDURE — 99215 PR OFFICE/OUTPT VISIT, EST, LEVL V, 40-54 MIN: ICD-10-PCS | Mod: S$PBB,,, | Performed by: INTERNAL MEDICINE

## 2021-04-21 ENCOUNTER — TELEPHONE (OUTPATIENT)
Dept: CARDIOLOGY | Facility: CLINIC | Age: 66
End: 2021-04-21

## 2021-04-21 DIAGNOSIS — Z79.4 TYPE 2 DIABETES MELLITUS WITH OTHER SPECIFIED COMPLICATION, WITH LONG-TERM CURRENT USE OF INSULIN: Primary | ICD-10-CM

## 2021-04-21 DIAGNOSIS — E11.69 TYPE 2 DIABETES MELLITUS WITH OTHER SPECIFIED COMPLICATION, WITH LONG-TERM CURRENT USE OF INSULIN: Primary | ICD-10-CM

## 2021-04-28 ENCOUNTER — PATIENT MESSAGE (OUTPATIENT)
Dept: FAMILY MEDICINE | Facility: CLINIC | Age: 66
End: 2021-04-28

## 2021-04-28 ENCOUNTER — OFFICE VISIT (OUTPATIENT)
Dept: ENDOCRINOLOGY | Facility: CLINIC | Age: 66
End: 2021-04-28
Payer: MEDICARE

## 2021-04-28 VITALS
HEART RATE: 107 BPM | SYSTOLIC BLOOD PRESSURE: 162 MMHG | HEIGHT: 72 IN | DIASTOLIC BLOOD PRESSURE: 92 MMHG | WEIGHT: 269.19 LBS | OXYGEN SATURATION: 94 % | BODY MASS INDEX: 36.46 KG/M2

## 2021-04-28 DIAGNOSIS — E66.01 CLASS 2 SEVERE OBESITY DUE TO EXCESS CALORIES WITH SERIOUS COMORBIDITY AND BODY MASS INDEX (BMI) OF 36.0 TO 36.9 IN ADULT: ICD-10-CM

## 2021-04-28 DIAGNOSIS — Z79.4 TYPE 2 DIABETES MELLITUS WITH HYPERGLYCEMIA, WITH LONG-TERM CURRENT USE OF INSULIN: Primary | ICD-10-CM

## 2021-04-28 DIAGNOSIS — I10 ESSENTIAL HYPERTENSION: ICD-10-CM

## 2021-04-28 DIAGNOSIS — E11.65 TYPE 2 DIABETES MELLITUS WITH HYPERGLYCEMIA, WITH LONG-TERM CURRENT USE OF INSULIN: Primary | ICD-10-CM

## 2021-04-28 PROBLEM — E66.812 CLASS 2 SEVERE OBESITY DUE TO EXCESS CALORIES WITH SERIOUS COMORBIDITY AND BODY MASS INDEX (BMI) OF 36.0 TO 36.9 IN ADULT: Status: ACTIVE | Noted: 2020-11-12

## 2021-04-28 PROCEDURE — 99204 OFFICE O/P NEW MOD 45 MIN: CPT | Mod: S$GLB,,, | Performed by: INTERNAL MEDICINE

## 2021-04-28 PROCEDURE — 99204 PR OFFICE/OUTPT VISIT, NEW, LEVL IV, 45-59 MIN: ICD-10-PCS | Mod: S$GLB,,, | Performed by: INTERNAL MEDICINE

## 2021-04-28 RX ORDER — DULAGLUTIDE 0.75 MG/.5ML
0.75 INJECTION, SOLUTION SUBCUTANEOUS
Qty: 4 PEN | Refills: 11 | Status: SHIPPED | OUTPATIENT
Start: 2021-04-28 | End: 2021-05-25 | Stop reason: DRUGHIGH

## 2021-05-04 ENCOUNTER — HOSPITAL ENCOUNTER (OUTPATIENT)
Dept: CARDIOLOGY | Facility: OTHER | Age: 66
Discharge: HOME | End: 2021-05-04
Attending: INTERNAL MEDICINE
Payer: MEDICARE

## 2021-05-04 ENCOUNTER — HOSPITAL ENCOUNTER (OUTPATIENT)
Dept: RADIOLOGY | Facility: OTHER | Age: 66
Discharge: HOME | End: 2021-05-04
Attending: INTERNAL MEDICINE
Payer: MEDICARE

## 2021-05-04 VITALS
HEIGHT: 72 IN | DIASTOLIC BLOOD PRESSURE: 92 MMHG | SYSTOLIC BLOOD PRESSURE: 162 MMHG | BODY MASS INDEX: 36.44 KG/M2 | WEIGHT: 269 LBS

## 2021-05-04 DIAGNOSIS — I50.22 CHRONIC SYSTOLIC HEART FAILURE: ICD-10-CM

## 2021-05-04 LAB
ASCENDING AORTA: 3.37 CM
AV INDEX (PROSTH): 1.16
AV MEAN GRADIENT: 2 MMHG
AV PEAK GRADIENT: 4 MMHG
AV VALVE AREA: 5.34 CM2
AV VELOCITY RATIO: 1.02
BSA FOR ECHO PROCEDURE: 2.49 M2
CV ECHO LV RWT: 0.42 CM
DOP CALC AO PEAK VEL: 0.95 M/S
DOP CALC AO VTI: 16.45 CM
DOP CALC LVOT AREA: 4.6 CM2
DOP CALC LVOT DIAMETER: 2.42 CM
DOP CALC LVOT PEAK VEL: 0.97 M/S
DOP CALC LVOT STROKE VOLUME: 87.85 CM3
DOP CALCLVOT PEAK VEL VTI: 19.11 CM
E WAVE DECELERATION TIME: 298.6 MSEC
E/A RATIO: 0.67
E/E' RATIO: 8.71 M/S
ECHO LV POSTERIOR WALL: 1.11 CM (ref 0.6–1.1)
EJECTION FRACTION: 50 %
FRACTIONAL SHORTENING: 18 % (ref 28–44)
INTERVENTRICULAR SEPTUM: 1.1 CM (ref 0.6–1.1)
IVRT: 106.11 MSEC
LA MAJOR: 4.38 CM
LA MINOR: 5.21 CM
LA WIDTH: 3.57 CM
LEFT ATRIUM SIZE: 4.07 CM
LEFT ATRIUM VOLUME INDEX MOD: 16.9 ML/M2
LEFT ATRIUM VOLUME INDEX: 24.3 ML/M2
LEFT ATRIUM VOLUME MOD: 41 CM3
LEFT ATRIUM VOLUME: 58.78 CM3
LEFT INTERNAL DIMENSION IN SYSTOLE: 4.36 CM (ref 2.1–4)
LEFT VENTRICLE DIASTOLIC VOLUME INDEX: 56.9 ML/M2
LEFT VENTRICLE DIASTOLIC VOLUME: 137.71 ML
LEFT VENTRICLE MASS INDEX: 96 G/M2
LEFT VENTRICLE SYSTOLIC VOLUME INDEX: 35.5 ML/M2
LEFT VENTRICLE SYSTOLIC VOLUME: 86.02 ML
LEFT VENTRICULAR INTERNAL DIMENSION IN DIASTOLE: 5.34 CM (ref 3.5–6)
LEFT VENTRICULAR MASS: 231.98 G
LV LATERAL E/E' RATIO: 7.4 M/S
LV SEPTAL E/E' RATIO: 10.57 M/S
MV PEAK A VEL: 1.1 M/S
MV PEAK E VEL: 0.74 M/S
MV STENOSIS PRESSURE HALF TIME: 86.59 MS
MV VALVE AREA P 1/2 METHOD: 2.54 CM2
PISA MRMAX VEL: 0.02 M/S
PV PEAK VELOCITY: 1.17 CM/S
RA MAJOR: 3.17 CM
RA PRESSURE: 3 MMHG
RIGHT VENTRICULAR END-DIASTOLIC DIMENSION: 3.37 CM
SINUS: 4.06 CM
STJ: 3.14 CM
TDI LATERAL: 0.1 M/S
TDI SEPTAL: 0.07 M/S
TDI: 0.09 M/S
TRICUSPID ANNULAR PLANE SYSTOLIC EXCURSION: 2.04 CM

## 2021-05-04 PROCEDURE — 93306 TTE W/DOPPLER COMPLETE: CPT

## 2021-05-04 PROCEDURE — 71046 XR CHEST PA AND LATERAL: ICD-10-PCS | Mod: 26,,, | Performed by: RADIOLOGY

## 2021-05-04 PROCEDURE — 71046 X-RAY EXAM CHEST 2 VIEWS: CPT | Mod: 26,,, | Performed by: RADIOLOGY

## 2021-05-04 PROCEDURE — 93306 ECHO (CUPID ONLY): ICD-10-PCS | Mod: 26,,, | Performed by: INTERNAL MEDICINE

## 2021-05-04 PROCEDURE — 93306 TTE W/DOPPLER COMPLETE: CPT | Mod: 26,,, | Performed by: INTERNAL MEDICINE

## 2021-05-04 PROCEDURE — 71046 X-RAY EXAM CHEST 2 VIEWS: CPT | Mod: TC,FY

## 2021-05-05 ENCOUNTER — CLINICAL SUPPORT (OUTPATIENT)
Dept: DIABETES | Facility: CLINIC | Age: 66
End: 2021-05-05
Payer: MEDICARE

## 2021-05-05 VITALS — BODY MASS INDEX: 35.66 KG/M2 | WEIGHT: 263.25 LBS | HEIGHT: 72 IN

## 2021-05-05 DIAGNOSIS — Z79.4 TYPE 2 DIABETES MELLITUS WITH OTHER SPECIFIED COMPLICATION, WITH LONG-TERM CURRENT USE OF INSULIN: ICD-10-CM

## 2021-05-05 DIAGNOSIS — E11.69 TYPE 2 DIABETES MELLITUS WITH OTHER SPECIFIED COMPLICATION, WITH LONG-TERM CURRENT USE OF INSULIN: ICD-10-CM

## 2021-05-05 PROCEDURE — 99999 PR PBB SHADOW E&M-EST. PATIENT-LVL III: ICD-10-PCS | Mod: PBBFAC,,,

## 2021-05-05 PROCEDURE — G0108 DIAB MANAGE TRN  PER INDIV: HCPCS | Mod: PBBFAC

## 2021-05-05 PROCEDURE — 99213 OFFICE O/P EST LOW 20 MIN: CPT | Mod: PBBFAC

## 2021-05-05 PROCEDURE — 99999 PR PBB SHADOW E&M-EST. PATIENT-LVL III: CPT | Mod: PBBFAC,,,

## 2021-05-06 ENCOUNTER — OFFICE VISIT (OUTPATIENT)
Dept: PODIATRY | Facility: CLINIC | Age: 66
End: 2021-05-06
Payer: MEDICARE

## 2021-05-06 ENCOUNTER — OFFICE VISIT (OUTPATIENT)
Dept: SLEEP MEDICINE | Facility: CLINIC | Age: 66
End: 2021-05-06
Payer: MEDICARE

## 2021-05-06 VITALS
TEMPERATURE: 97 F | HEIGHT: 72 IN | DIASTOLIC BLOOD PRESSURE: 93 MMHG | HEART RATE: 98 BPM | WEIGHT: 263 LBS | BODY MASS INDEX: 35.62 KG/M2 | SYSTOLIC BLOOD PRESSURE: 172 MMHG

## 2021-05-06 VITALS
HEART RATE: 107 BPM | WEIGHT: 263 LBS | DIASTOLIC BLOOD PRESSURE: 92 MMHG | HEIGHT: 72 IN | BODY MASS INDEX: 35.62 KG/M2 | SYSTOLIC BLOOD PRESSURE: 149 MMHG

## 2021-05-06 DIAGNOSIS — I73.9 PVD (PERIPHERAL VASCULAR DISEASE): ICD-10-CM

## 2021-05-06 DIAGNOSIS — G47.10 HYPERSOMNOLENCE: ICD-10-CM

## 2021-05-06 DIAGNOSIS — E11.65 TYPE 2 DIABETES MELLITUS WITH HYPERGLYCEMIA, WITH LONG-TERM CURRENT USE OF INSULIN: Primary | ICD-10-CM

## 2021-05-06 DIAGNOSIS — G47.33 OSA (OBSTRUCTIVE SLEEP APNEA): ICD-10-CM

## 2021-05-06 DIAGNOSIS — Z79.4 TYPE 2 DIABETES MELLITUS WITH HYPERGLYCEMIA, WITH LONG-TERM CURRENT USE OF INSULIN: Primary | ICD-10-CM

## 2021-05-06 DIAGNOSIS — Z79.891 CHRONIC USE OF OPIATE DRUG FOR THERAPEUTIC PURPOSE: Primary | ICD-10-CM

## 2021-05-06 DIAGNOSIS — B35.1 ONYCHOMYCOSIS DUE TO DERMATOPHYTE: ICD-10-CM

## 2021-05-06 PROCEDURE — 11721 DEBRIDE NAIL 6 OR MORE: CPT | Mod: Q9,S$PBB,, | Performed by: PODIATRIST

## 2021-05-06 PROCEDURE — 99999 PR PBB SHADOW E&M-EST. PATIENT-LVL IV: CPT | Mod: PBBFAC,,, | Performed by: PODIATRIST

## 2021-05-06 PROCEDURE — 99204 OFFICE O/P NEW MOD 45 MIN: CPT | Mod: S$PBB,,, | Performed by: INTERNAL MEDICINE

## 2021-05-06 PROCEDURE — 99213 OFFICE O/P EST LOW 20 MIN: CPT | Mod: PBBFAC,25 | Performed by: INTERNAL MEDICINE

## 2021-05-06 PROCEDURE — 99999 PR PBB SHADOW E&M-EST. PATIENT-LVL III: CPT | Mod: PBBFAC,,, | Performed by: INTERNAL MEDICINE

## 2021-05-06 PROCEDURE — 99999 PR PBB SHADOW E&M-EST. PATIENT-LVL III: ICD-10-PCS | Mod: PBBFAC,,, | Performed by: INTERNAL MEDICINE

## 2021-05-06 PROCEDURE — 99204 OFFICE O/P NEW MOD 45 MIN: CPT | Mod: 25,S$PBB,, | Performed by: PODIATRIST

## 2021-05-06 PROCEDURE — 99204 PR OFFICE/OUTPT VISIT, NEW, LEVL IV, 45-59 MIN: ICD-10-PCS | Mod: S$PBB,,, | Performed by: INTERNAL MEDICINE

## 2021-05-06 PROCEDURE — 99204 PR OFFICE/OUTPT VISIT, NEW, LEVL IV, 45-59 MIN: ICD-10-PCS | Mod: 25,S$PBB,, | Performed by: PODIATRIST

## 2021-05-06 PROCEDURE — 99999 PR PBB SHADOW E&M-EST. PATIENT-LVL IV: ICD-10-PCS | Mod: PBBFAC,,, | Performed by: PODIATRIST

## 2021-05-06 PROCEDURE — 11721 DEBRIDE NAIL 6 OR MORE: CPT | Mod: Q9,PBBFAC,PN | Performed by: PODIATRIST

## 2021-05-06 PROCEDURE — 99214 OFFICE O/P EST MOD 30 MIN: CPT | Mod: PBBFAC,27,PN | Performed by: PODIATRIST

## 2021-05-06 PROCEDURE — 11721 PR DEBRIDEMENT OF NAILS, 6 OR MORE: ICD-10-PCS | Mod: Q9,S$PBB,, | Performed by: PODIATRIST

## 2021-05-06 RX ORDER — AZITHROMYCIN 250 MG/1
TABLET, FILM COATED ORAL
COMMUNITY
Start: 2021-02-11 | End: 2021-05-25

## 2021-05-06 RX ORDER — CARVEDILOL 3.12 MG/1
3.12 TABLET ORAL
COMMUNITY
Start: 2020-11-19 | End: 2021-06-30

## 2021-05-06 RX ORDER — CLINDAMYCIN HYDROCHLORIDE 300 MG/1
CAPSULE ORAL
COMMUNITY
Start: 2021-05-03 | End: 2021-05-25

## 2021-05-06 RX ORDER — NYSTATIN 100000 [USP'U]/ML
SUSPENSION ORAL
COMMUNITY
Start: 2021-04-19 | End: 2021-08-09

## 2021-05-06 RX ORDER — CICLOPIROX 80 MG/ML
SOLUTION TOPICAL NIGHTLY
Qty: 6.6 ML | Refills: 11 | Status: SHIPPED | OUTPATIENT
Start: 2021-05-06 | End: 2023-01-22

## 2021-05-07 ENCOUNTER — PATIENT MESSAGE (OUTPATIENT)
Dept: DIABETES | Facility: CLINIC | Age: 66
End: 2021-05-07

## 2021-05-10 ENCOUNTER — TELEPHONE (OUTPATIENT)
Dept: SLEEP MEDICINE | Facility: OTHER | Age: 66
End: 2021-05-10

## 2021-05-12 ENCOUNTER — PATIENT MESSAGE (OUTPATIENT)
Dept: RHEUMATOLOGY | Facility: CLINIC | Age: 66
End: 2021-05-12

## 2021-05-14 DIAGNOSIS — M06.9 RHEUMATOID ARTHRITIS INVOLVING MULTIPLE JOINTS: Primary | ICD-10-CM

## 2021-05-14 RX ORDER — PREDNISONE 2.5 MG/1
2.5 TABLET ORAL DAILY
Qty: 90 TABLET | Refills: 3 | Status: SHIPPED | OUTPATIENT
Start: 2021-05-14 | End: 2021-08-18 | Stop reason: SDUPTHER

## 2021-05-20 ENCOUNTER — TELEPHONE (OUTPATIENT)
Dept: SLEEP MEDICINE | Facility: OTHER | Age: 66
End: 2021-05-20

## 2021-05-24 ENCOUNTER — TELEPHONE (OUTPATIENT)
Dept: SLEEP MEDICINE | Facility: OTHER | Age: 66
End: 2021-05-24

## 2021-05-24 ENCOUNTER — HOSPITAL ENCOUNTER (OUTPATIENT)
Dept: SLEEP MEDICINE | Facility: OTHER | Age: 66
Discharge: HOME | End: 2021-05-24
Attending: INTERNAL MEDICINE
Payer: MEDICARE

## 2021-05-24 DIAGNOSIS — G47.33 OSA (OBSTRUCTIVE SLEEP APNEA): ICD-10-CM

## 2021-05-24 DIAGNOSIS — G47.10 HYPERSOMNOLENCE: ICD-10-CM

## 2021-05-24 DIAGNOSIS — Z79.891 CHRONIC USE OF OPIATE DRUG FOR THERAPEUTIC PURPOSE: ICD-10-CM

## 2021-05-24 PROCEDURE — 95811 POLYSOM 6/>YRS CPAP 4/> PARM: CPT

## 2021-05-24 PROCEDURE — 95811 POLYSOM 6/>YRS CPAP 4/> PARM: CPT | Mod: 26,,, | Performed by: INTERNAL MEDICINE

## 2021-05-24 PROCEDURE — 95811 PR POLYSOMNOGRAPHY W/CPAP: ICD-10-PCS | Mod: 26,,, | Performed by: INTERNAL MEDICINE

## 2021-05-25 ENCOUNTER — CLINICAL SUPPORT (OUTPATIENT)
Dept: DIABETES | Facility: CLINIC | Age: 66
End: 2021-05-25
Payer: MEDICARE

## 2021-05-25 ENCOUNTER — LAB VISIT (OUTPATIENT)
Dept: LAB | Facility: OTHER | Age: 66
End: 2021-05-25
Attending: INTERNAL MEDICINE
Payer: MEDICARE

## 2021-05-25 VITALS — BODY MASS INDEX: 35.23 KG/M2 | HEIGHT: 72 IN | WEIGHT: 260.13 LBS

## 2021-05-25 DIAGNOSIS — G93.32 CHRONIC FATIGUE SYNDROME: ICD-10-CM

## 2021-05-25 DIAGNOSIS — Z79.4 TYPE 2 DIABETES MELLITUS WITH OTHER SPECIFIED COMPLICATION, WITH LONG-TERM CURRENT USE OF INSULIN: Primary | ICD-10-CM

## 2021-05-25 DIAGNOSIS — Z12.5 SCREENING FOR PROSTATE CANCER: ICD-10-CM

## 2021-05-25 DIAGNOSIS — E11.65 TYPE 2 DIABETES MELLITUS WITH HYPERGLYCEMIA, WITH LONG-TERM CURRENT USE OF INSULIN: Primary | ICD-10-CM

## 2021-05-25 DIAGNOSIS — E11.65 TYPE 2 DIABETES MELLITUS WITH HYPERGLYCEMIA, WITH LONG-TERM CURRENT USE OF INSULIN: ICD-10-CM

## 2021-05-25 DIAGNOSIS — E11.69 TYPE 2 DIABETES MELLITUS WITH OTHER SPECIFIED COMPLICATION, WITH LONG-TERM CURRENT USE OF INSULIN: Primary | ICD-10-CM

## 2021-05-25 DIAGNOSIS — I25.118 ATHEROSCLEROSIS OF NATIVE CORONARY ARTERY OF NATIVE HEART WITH STABLE ANGINA PECTORIS: ICD-10-CM

## 2021-05-25 DIAGNOSIS — I50.22 CHRONIC SYSTOLIC HEART FAILURE: ICD-10-CM

## 2021-05-25 DIAGNOSIS — Z79.52 ON PREDNISONE THERAPY: ICD-10-CM

## 2021-05-25 DIAGNOSIS — Z79.4 TYPE 2 DIABETES MELLITUS WITH HYPERGLYCEMIA, WITH LONG-TERM CURRENT USE OF INSULIN: ICD-10-CM

## 2021-05-25 DIAGNOSIS — E11.65 UNCONTROLLED TYPE 2 DIABETES MELLITUS WITH HYPERGLYCEMIA: ICD-10-CM

## 2021-05-25 DIAGNOSIS — Z79.4 TYPE 2 DIABETES MELLITUS WITH HYPERGLYCEMIA, WITH LONG-TERM CURRENT USE OF INSULIN: Primary | ICD-10-CM

## 2021-05-25 LAB
ALBUMIN SERPL BCP-MCNC: 3.2 G/DL (ref 3.5–5.2)
ALP SERPL-CCNC: 84 U/L (ref 55–135)
ALT SERPL W/O P-5'-P-CCNC: 42 U/L (ref 10–44)
ANION GAP SERPL CALC-SCNC: 11 MMOL/L (ref 8–16)
AST SERPL-CCNC: 20 U/L (ref 10–40)
BASOPHILS # BLD AUTO: 0.07 K/UL (ref 0–0.2)
BASOPHILS NFR BLD: 0.7 % (ref 0–1.9)
BILIRUB SERPL-MCNC: 0.6 MG/DL (ref 0.1–1)
BNP SERPL-MCNC: 103 PG/ML (ref 0–99)
BUN SERPL-MCNC: 19 MG/DL (ref 8–23)
C PEPTIDE SERPL-MCNC: 0.29 NG/ML (ref 0.78–5.19)
CALCIUM SERPL-MCNC: 8.8 MG/DL (ref 8.7–10.5)
CHLORIDE SERPL-SCNC: 102 MMOL/L (ref 95–110)
CHOLEST SERPL-MCNC: 170 MG/DL (ref 120–199)
CHOLEST/HDLC SERPL: 3.7 {RATIO} (ref 2–5)
CO2 SERPL-SCNC: 28 MMOL/L (ref 23–29)
COMPLEXED PSA SERPL-MCNC: 0.16 NG/ML (ref 0–4)
CREAT SERPL-MCNC: 0.9 MG/DL (ref 0.5–1.4)
DIFFERENTIAL METHOD: ABNORMAL
EOSINOPHIL # BLD AUTO: 0.2 K/UL (ref 0–0.5)
EOSINOPHIL NFR BLD: 1.6 % (ref 0–8)
ERYTHROCYTE [DISTWIDTH] IN BLOOD BY AUTOMATED COUNT: 12.9 % (ref 11.5–14.5)
EST. GFR  (AFRICAN AMERICAN): >60 ML/MIN/1.73 M^2
EST. GFR  (NON AFRICAN AMERICAN): >60 ML/MIN/1.73 M^2
ESTIMATED AVG GLUCOSE: 177 MG/DL (ref 68–131)
GLUCOSE SERPL-MCNC: 77 MG/DL (ref 70–110)
HBA1C MFR BLD: 7.8 % (ref 4–5.6)
HCT VFR BLD AUTO: 56.4 % (ref 40–54)
HDLC SERPL-MCNC: 46 MG/DL (ref 40–75)
HDLC SERPL: 27.1 % (ref 20–50)
HGB BLD-MCNC: 18.8 G/DL (ref 14–18)
IMM GRANULOCYTES # BLD AUTO: 0.06 K/UL (ref 0–0.04)
IMM GRANULOCYTES NFR BLD AUTO: 0.6 % (ref 0–0.5)
LDLC SERPL CALC-MCNC: 101.8 MG/DL (ref 63–159)
LYMPHOCYTES # BLD AUTO: 2.6 K/UL (ref 1–4.8)
LYMPHOCYTES NFR BLD: 27.2 % (ref 18–48)
MCH RBC QN AUTO: 31.9 PG (ref 27–31)
MCHC RBC AUTO-ENTMCNC: 33.3 G/DL (ref 32–36)
MCV RBC AUTO: 96 FL (ref 82–98)
MONOCYTES # BLD AUTO: 0.9 K/UL (ref 0.3–1)
MONOCYTES NFR BLD: 8.8 % (ref 4–15)
NEUTROPHILS # BLD AUTO: 5.9 K/UL (ref 1.8–7.7)
NEUTROPHILS NFR BLD: 61.1 % (ref 38–73)
NONHDLC SERPL-MCNC: 124 MG/DL
NRBC BLD-RTO: 0 /100 WBC
PLATELET # BLD AUTO: 162 K/UL (ref 150–450)
PMV BLD AUTO: 11.3 FL (ref 9.2–12.9)
POTASSIUM SERPL-SCNC: 3.8 MMOL/L (ref 3.5–5.1)
PROT SERPL-MCNC: 6.8 G/DL (ref 6–8.4)
RBC # BLD AUTO: 5.9 M/UL (ref 4.6–6.2)
SODIUM SERPL-SCNC: 141 MMOL/L (ref 136–145)
TRIGL SERPL-MCNC: 111 MG/DL (ref 30–150)
WBC # BLD AUTO: 9.63 K/UL (ref 3.9–12.7)

## 2021-05-25 PROCEDURE — 80053 COMPREHEN METABOLIC PANEL: CPT | Performed by: INTERNAL MEDICINE

## 2021-05-25 PROCEDURE — 99999 PR PBB SHADOW E&M-EST. PATIENT-LVL III: ICD-10-PCS | Mod: PBBFAC,,,

## 2021-05-25 PROCEDURE — 99213 OFFICE O/P EST LOW 20 MIN: CPT | Mod: PBBFAC

## 2021-05-25 PROCEDURE — 83880 ASSAY OF NATRIURETIC PEPTIDE: CPT | Performed by: INTERNAL MEDICINE

## 2021-05-25 PROCEDURE — 85025 COMPLETE CBC W/AUTO DIFF WBC: CPT | Performed by: INTERNAL MEDICINE

## 2021-05-25 PROCEDURE — 99999 PR PBB SHADOW E&M-EST. PATIENT-LVL III: CPT | Mod: PBBFAC,,,

## 2021-05-25 PROCEDURE — 84681 ASSAY OF C-PEPTIDE: CPT | Performed by: INTERNAL MEDICINE

## 2021-05-25 PROCEDURE — 84153 ASSAY OF PSA TOTAL: CPT | Performed by: INTERNAL MEDICINE

## 2021-05-25 PROCEDURE — 36415 COLL VENOUS BLD VENIPUNCTURE: CPT | Performed by: INTERNAL MEDICINE

## 2021-05-25 PROCEDURE — G0108 DIAB MANAGE TRN  PER INDIV: HCPCS | Mod: PBBFAC

## 2021-05-25 PROCEDURE — 80061 LIPID PANEL: CPT | Performed by: INTERNAL MEDICINE

## 2021-05-25 PROCEDURE — 83036 HEMOGLOBIN GLYCOSYLATED A1C: CPT | Performed by: INTERNAL MEDICINE

## 2021-05-25 RX ORDER — DULAGLUTIDE 1.5 MG/.5ML
1.5 INJECTION, SOLUTION SUBCUTANEOUS WEEKLY
Qty: 4 PEN | Refills: 6 | Status: SHIPPED | OUTPATIENT
Start: 2021-05-25 | End: 2021-07-28

## 2021-05-25 RX ORDER — PREDNISONE 5 MG/1
5 TABLET ORAL
COMMUNITY
Start: 2021-04-25 | End: 2021-08-18 | Stop reason: SDUPTHER

## 2021-06-03 ENCOUNTER — PATIENT MESSAGE (OUTPATIENT)
Dept: SLEEP MEDICINE | Facility: CLINIC | Age: 66
End: 2021-06-03

## 2021-06-03 DIAGNOSIS — G47.33 OSA (OBSTRUCTIVE SLEEP APNEA): Primary | ICD-10-CM

## 2021-06-08 ENCOUNTER — PATIENT MESSAGE (OUTPATIENT)
Dept: RHEUMATOLOGY | Facility: CLINIC | Age: 66
End: 2021-06-08

## 2021-06-08 ENCOUNTER — PATIENT MESSAGE (OUTPATIENT)
Dept: CARDIOLOGY | Facility: CLINIC | Age: 66
End: 2021-06-08

## 2021-06-16 ENCOUNTER — HOSPITAL ENCOUNTER (OUTPATIENT)
Dept: RADIOLOGY | Facility: OTHER | Age: 66
Discharge: HOME | End: 2021-06-16
Attending: INTERNAL MEDICINE
Payer: MEDICARE

## 2021-06-16 ENCOUNTER — PATIENT MESSAGE (OUTPATIENT)
Dept: SLEEP MEDICINE | Facility: CLINIC | Age: 66
End: 2021-06-16

## 2021-06-16 DIAGNOSIS — R22.32 WRIST LUMP, LEFT: ICD-10-CM

## 2021-06-16 DIAGNOSIS — R22.32 MASS OF ARM, LEFT: ICD-10-CM

## 2021-06-16 PROCEDURE — 73090 X-RAY EXAM OF FOREARM: CPT | Mod: TC,FY,LT

## 2021-06-16 PROCEDURE — 73090 XR FOREARM LEFT: ICD-10-PCS | Mod: 26,LT,, | Performed by: RADIOLOGY

## 2021-06-16 PROCEDURE — 73060 X-RAY EXAM OF HUMERUS: CPT | Mod: TC,FY,LT

## 2021-06-16 PROCEDURE — 73060 X-RAY EXAM OF HUMERUS: CPT | Mod: 26,LT,, | Performed by: RADIOLOGY

## 2021-06-16 PROCEDURE — 73090 X-RAY EXAM OF FOREARM: CPT | Mod: 26,LT,, | Performed by: RADIOLOGY

## 2021-06-16 PROCEDURE — 73060 XR HUMERUS 2 VIEW LEFT: ICD-10-PCS | Mod: 26,LT,, | Performed by: RADIOLOGY

## 2021-06-23 ENCOUNTER — PATIENT MESSAGE (OUTPATIENT)
Dept: SLEEP MEDICINE | Facility: CLINIC | Age: 66
End: 2021-06-23

## 2021-06-23 ENCOUNTER — PATIENT MESSAGE (OUTPATIENT)
Dept: DIABETES | Facility: CLINIC | Age: 66
End: 2021-06-23

## 2021-06-25 ENCOUNTER — PATIENT MESSAGE (OUTPATIENT)
Dept: SLEEP MEDICINE | Facility: CLINIC | Age: 66
End: 2021-06-25

## 2021-06-27 ENCOUNTER — PATIENT MESSAGE (OUTPATIENT)
Dept: SLEEP MEDICINE | Facility: CLINIC | Age: 66
End: 2021-06-27

## 2021-06-29 ENCOUNTER — PATIENT MESSAGE (OUTPATIENT)
Dept: CARDIOLOGY | Facility: CLINIC | Age: 66
End: 2021-06-29

## 2021-06-30 RX ORDER — CARVEDILOL 12.5 MG/1
12.5 TABLET ORAL 2 TIMES DAILY WITH MEALS
Qty: 60 TABLET | Refills: 11 | Status: SHIPPED | OUTPATIENT
Start: 2021-06-30 | End: 2021-08-09

## 2021-07-01 DIAGNOSIS — E11.69 TYPE 2 DIABETES MELLITUS WITH OTHER SPECIFIED COMPLICATION, WITH LONG-TERM CURRENT USE OF INSULIN: Primary | ICD-10-CM

## 2021-07-01 DIAGNOSIS — Z79.4 TYPE 2 DIABETES MELLITUS WITH OTHER SPECIFIED COMPLICATION, WITH LONG-TERM CURRENT USE OF INSULIN: Primary | ICD-10-CM

## 2021-07-06 ENCOUNTER — PATIENT MESSAGE (OUTPATIENT)
Dept: CARDIOLOGY | Facility: CLINIC | Age: 66
End: 2021-07-06

## 2021-07-06 DIAGNOSIS — I50.22 CHRONIC SYSTOLIC HEART FAILURE: Primary | ICD-10-CM

## 2021-07-06 RX ORDER — FUROSEMIDE 80 MG/1
80 TABLET ORAL 2 TIMES DAILY
Qty: 180 TABLET | Refills: 1 | Status: SHIPPED | OUTPATIENT
Start: 2021-07-06 | End: 2021-07-06 | Stop reason: DRUGHIGH

## 2021-07-07 RX ORDER — FUROSEMIDE 40 MG/1
40 TABLET ORAL 2 TIMES DAILY
Qty: 180 TABLET | Refills: 2 | Status: SHIPPED | OUTPATIENT
Start: 2021-07-07 | End: 2021-12-23 | Stop reason: SDUPTHER

## 2021-07-08 ENCOUNTER — PATIENT MESSAGE (OUTPATIENT)
Dept: CARDIOLOGY | Facility: CLINIC | Age: 66
End: 2021-07-08

## 2021-07-12 ENCOUNTER — OFFICE VISIT (OUTPATIENT)
Dept: RHEUMATOLOGY | Facility: CLINIC | Age: 66
End: 2021-07-12
Payer: MEDICARE

## 2021-07-12 ENCOUNTER — PATIENT MESSAGE (OUTPATIENT)
Dept: CARDIOLOGY | Facility: CLINIC | Age: 66
End: 2021-07-12

## 2021-07-12 VITALS
WEIGHT: 258.19 LBS | DIASTOLIC BLOOD PRESSURE: 83 MMHG | BODY MASS INDEX: 34.97 KG/M2 | HEIGHT: 72 IN | HEART RATE: 90 BPM | SYSTOLIC BLOOD PRESSURE: 158 MMHG

## 2021-07-12 DIAGNOSIS — Z79.899 ENCOUNTER FOR LONG-TERM CURRENT USE OF HIGH RISK MEDICATION: Primary | ICD-10-CM

## 2021-07-12 DIAGNOSIS — M47.817 SPONDYLOSIS WITHOUT MYELOPATHY OR RADICULOPATHY, LUMBOSACRAL REGION: ICD-10-CM

## 2021-07-12 DIAGNOSIS — M06.9 RHEUMATOID ARTHRITIS FLARE: ICD-10-CM

## 2021-07-12 PROCEDURE — 99999 PR PBB SHADOW E&M-EST. PATIENT-LVL IV: ICD-10-PCS | Mod: PBBFAC,,, | Performed by: INTERNAL MEDICINE

## 2021-07-12 PROCEDURE — 1159F PR MEDICATION LIST DOCUMENTED IN MEDICAL RECORD: ICD-10-PCS | Mod: S$GLB,,, | Performed by: INTERNAL MEDICINE

## 2021-07-12 PROCEDURE — 99215 OFFICE O/P EST HI 40 MIN: CPT | Mod: S$GLB,,, | Performed by: INTERNAL MEDICINE

## 2021-07-12 PROCEDURE — 99999 PR PBB SHADOW E&M-EST. PATIENT-LVL IV: CPT | Mod: PBBFAC,,, | Performed by: INTERNAL MEDICINE

## 2021-07-12 PROCEDURE — 3008F PR BODY MASS INDEX (BMI) DOCUMENTED: ICD-10-PCS | Mod: CPTII,S$GLB,, | Performed by: INTERNAL MEDICINE

## 2021-07-12 PROCEDURE — 1101F PT FALLS ASSESS-DOCD LE1/YR: CPT | Mod: CPTII,S$GLB,, | Performed by: INTERNAL MEDICINE

## 2021-07-12 PROCEDURE — 3288F PR FALLS RISK ASSESSMENT DOCUMENTED: ICD-10-PCS | Mod: CPTII,S$GLB,, | Performed by: INTERNAL MEDICINE

## 2021-07-12 PROCEDURE — 3288F FALL RISK ASSESSMENT DOCD: CPT | Mod: CPTII,S$GLB,, | Performed by: INTERNAL MEDICINE

## 2021-07-12 PROCEDURE — 99499 RISK ADDL DX/OHS AUDIT: ICD-10-PCS | Mod: S$GLB,,, | Performed by: INTERNAL MEDICINE

## 2021-07-12 PROCEDURE — 1125F PR PAIN SEVERITY QUANTIFIED, PAIN PRESENT: ICD-10-PCS | Mod: S$GLB,,, | Performed by: INTERNAL MEDICINE

## 2021-07-12 PROCEDURE — 1125F AMNT PAIN NOTED PAIN PRSNT: CPT | Mod: S$GLB,,, | Performed by: INTERNAL MEDICINE

## 2021-07-12 PROCEDURE — 99215 PR OFFICE/OUTPT VISIT, EST, LEVL V, 40-54 MIN: ICD-10-PCS | Mod: S$GLB,,, | Performed by: INTERNAL MEDICINE

## 2021-07-12 PROCEDURE — 1159F MED LIST DOCD IN RCRD: CPT | Mod: S$GLB,,, | Performed by: INTERNAL MEDICINE

## 2021-07-12 PROCEDURE — 1101F PR PT FALLS ASSESS DOC 0-1 FALLS W/OUT INJ PAST YR: ICD-10-PCS | Mod: CPTII,S$GLB,, | Performed by: INTERNAL MEDICINE

## 2021-07-12 PROCEDURE — 3008F BODY MASS INDEX DOCD: CPT | Mod: CPTII,S$GLB,, | Performed by: INTERNAL MEDICINE

## 2021-07-12 PROCEDURE — 99499 UNLISTED E&M SERVICE: CPT | Mod: S$GLB,,, | Performed by: INTERNAL MEDICINE

## 2021-07-12 RX ORDER — SODIUM CHLORIDE 0.9 % (FLUSH) 0.9 %
10 SYRINGE (ML) INJECTION
Status: CANCELLED | OUTPATIENT
Start: 2021-07-12

## 2021-07-12 RX ORDER — DIPHENHYDRAMINE HYDROCHLORIDE 50 MG/ML
25 INJECTION INTRAMUSCULAR; INTRAVENOUS
Status: CANCELLED | OUTPATIENT
Start: 2021-07-12

## 2021-07-12 RX ORDER — HEPARIN 100 UNIT/ML
500 SYRINGE INTRAVENOUS
Status: CANCELLED | OUTPATIENT
Start: 2021-07-12

## 2021-07-12 RX ORDER — DIPHENHYDRAMINE HYDROCHLORIDE 50 MG/ML
50 INJECTION INTRAMUSCULAR; INTRAVENOUS ONCE AS NEEDED
Status: CANCELLED | OUTPATIENT
Start: 2021-07-12

## 2021-07-12 RX ORDER — EPINEPHRINE 0.3 MG/.3ML
0.3 INJECTION SUBCUTANEOUS ONCE AS NEEDED
Status: CANCELLED | OUTPATIENT
Start: 2021-07-12

## 2021-07-12 RX ORDER — METOPROLOL SUCCINATE 25 MG/1
TABLET, EXTENDED RELEASE ORAL
COMMUNITY
Start: 2021-06-02 | End: 2021-08-09

## 2021-07-12 RX ORDER — ACETAMINOPHEN 325 MG/1
650 TABLET ORAL
Status: CANCELLED | OUTPATIENT
Start: 2021-07-12

## 2021-07-12 ASSESSMENT — ROUTINE ASSESSMENT OF PATIENT INDEX DATA (RAPID3)
TOTAL RAPID3 SCORE: 6.22
PATIENT GLOBAL ASSESSMENT SCORE: 8.5
MDHAQ FUNCTION SCORE: 1.1
AM STIFFNESS SCORE: 1, YES
PSYCHOLOGICAL DISTRESS SCORE: 3.3
FATIGUE SCORE: 10
PAIN SCORE: 6.5

## 2021-07-20 ENCOUNTER — NURSE TRIAGE (OUTPATIENT)
Dept: ADMINISTRATIVE | Facility: CLINIC | Age: 66
End: 2021-07-20

## 2021-07-21 ENCOUNTER — PATIENT MESSAGE (OUTPATIENT)
Dept: RHEUMATOLOGY | Facility: CLINIC | Age: 66
End: 2021-07-21

## 2021-07-21 ENCOUNTER — CLINICAL SUPPORT (OUTPATIENT)
Dept: DIABETES | Facility: CLINIC | Age: 66
End: 2021-07-21
Payer: MEDICARE

## 2021-07-21 ENCOUNTER — HOSPITAL ENCOUNTER (OUTPATIENT)
Dept: RADIOLOGY | Facility: OTHER | Age: 66
Discharge: HOME | End: 2021-07-21
Attending: INTERNAL MEDICINE
Payer: MEDICARE

## 2021-07-21 VITALS — BODY MASS INDEX: 34.76 KG/M2 | WEIGHT: 256.63 LBS | HEIGHT: 72 IN

## 2021-07-21 DIAGNOSIS — E11.69 TYPE 2 DIABETES MELLITUS WITH OTHER SPECIFIED COMPLICATION, WITH LONG-TERM CURRENT USE OF INSULIN: ICD-10-CM

## 2021-07-21 DIAGNOSIS — M47.817 SPONDYLOSIS WITHOUT MYELOPATHY OR RADICULOPATHY, LUMBOSACRAL REGION: ICD-10-CM

## 2021-07-21 DIAGNOSIS — Z79.4 TYPE 2 DIABETES MELLITUS WITH OTHER SPECIFIED COMPLICATION, WITH LONG-TERM CURRENT USE OF INSULIN: ICD-10-CM

## 2021-07-21 PROCEDURE — 99999 PR PBB SHADOW E&M-EST. PATIENT-LVL IV: ICD-10-PCS | Mod: PBBFAC,,,

## 2021-07-21 PROCEDURE — G0108 PR DIAB MANAGE TRN  PER INDIV: ICD-10-PCS | Mod: S$GLB,,, | Performed by: FAMILY MEDICINE

## 2021-07-21 PROCEDURE — G0108 DIAB MANAGE TRN  PER INDIV: HCPCS | Mod: S$GLB,,, | Performed by: FAMILY MEDICINE

## 2021-07-21 PROCEDURE — 99999 PR PBB SHADOW E&M-EST. PATIENT-LVL IV: CPT | Mod: PBBFAC,,,

## 2021-07-23 ENCOUNTER — PATIENT MESSAGE (OUTPATIENT)
Dept: SLEEP MEDICINE | Facility: CLINIC | Age: 66
End: 2021-07-23

## 2021-07-28 ENCOUNTER — OFFICE VISIT (OUTPATIENT)
Dept: INFECTIOUS DISEASES | Facility: CLINIC | Age: 66
End: 2021-07-28
Payer: MEDICARE

## 2021-07-28 ENCOUNTER — OFFICE VISIT (OUTPATIENT)
Dept: ENDOCRINOLOGY | Facility: CLINIC | Age: 66
End: 2021-07-28
Payer: MEDICARE

## 2021-07-28 ENCOUNTER — LAB VISIT (OUTPATIENT)
Dept: LAB | Facility: OTHER | Age: 66
End: 2021-07-28
Payer: MEDICARE

## 2021-07-28 VITALS
HEART RATE: 85 BPM | SYSTOLIC BLOOD PRESSURE: 121 MMHG | DIASTOLIC BLOOD PRESSURE: 74 MMHG | HEIGHT: 72 IN | BODY MASS INDEX: 34.76 KG/M2 | WEIGHT: 256.63 LBS

## 2021-07-28 DIAGNOSIS — D84.9 IMMUNOCOMPROMISED, ACQUIRED: Primary | ICD-10-CM

## 2021-07-28 DIAGNOSIS — E66.09 CLASS 1 OBESITY DUE TO EXCESS CALORIES WITH SERIOUS COMORBIDITY AND BODY MASS INDEX (BMI) OF 34.0 TO 34.9 IN ADULT: ICD-10-CM

## 2021-07-28 DIAGNOSIS — M06.9 RHEUMATOID ARTHRITIS INVOLVING MULTIPLE JOINTS: ICD-10-CM

## 2021-07-28 DIAGNOSIS — Z79.4 TYPE 2 DIABETES MELLITUS WITH HYPERGLYCEMIA, WITH LONG-TERM CURRENT USE OF INSULIN: Primary | ICD-10-CM

## 2021-07-28 DIAGNOSIS — I10 ESSENTIAL HYPERTENSION: ICD-10-CM

## 2021-07-28 DIAGNOSIS — E11.65 TYPE 2 DIABETES MELLITUS WITH HYPERGLYCEMIA, WITH LONG-TERM CURRENT USE OF INSULIN: Primary | ICD-10-CM

## 2021-07-28 DIAGNOSIS — R22.32 WRIST LUMP, LEFT: ICD-10-CM

## 2021-07-28 PROBLEM — E66.811 CLASS 1 OBESITY DUE TO EXCESS CALORIES WITH SERIOUS COMORBIDITY AND BODY MASS INDEX (BMI) OF 34.0 TO 34.9 IN ADULT: Status: ACTIVE | Noted: 2020-11-12

## 2021-07-28 PROCEDURE — 1159F MED LIST DOCD IN RCRD: CPT | Mod: CPTII,S$GLB,, | Performed by: INTERNAL MEDICINE

## 2021-07-28 PROCEDURE — 3051F HG A1C>EQUAL 7.0%<8.0%: CPT | Mod: CPTII,S$GLB,, | Performed by: INTERNAL MEDICINE

## 2021-07-28 PROCEDURE — 3288F FALL RISK ASSESSMENT DOCD: CPT | Mod: CPTII,S$GLB,, | Performed by: INTERNAL MEDICINE

## 2021-07-28 PROCEDURE — 99204 PR OFFICE/OUTPT VISIT, NEW, LEVL IV, 45-59 MIN: ICD-10-PCS | Mod: 95,,, | Performed by: PHYSICIAN ASSISTANT

## 2021-07-28 PROCEDURE — 3078F PR MOST RECENT DIASTOLIC BLOOD PRESSURE < 80 MM HG: ICD-10-PCS | Mod: CPTII,S$GLB,, | Performed by: INTERNAL MEDICINE

## 2021-07-28 PROCEDURE — 3078F DIAST BP <80 MM HG: CPT | Mod: CPTII,S$GLB,, | Performed by: INTERNAL MEDICINE

## 2021-07-28 PROCEDURE — 1160F RVW MEDS BY RX/DR IN RCRD: CPT | Mod: CPTII,S$GLB,, | Performed by: INTERNAL MEDICINE

## 2021-07-28 PROCEDURE — 1160F RVW MEDS BY RX/DR IN RCRD: CPT | Mod: CPTII,95,, | Performed by: PHYSICIAN ASSISTANT

## 2021-07-28 PROCEDURE — 3008F PR BODY MASS INDEX (BMI) DOCUMENTED: ICD-10-PCS | Mod: CPTII,S$GLB,, | Performed by: INTERNAL MEDICINE

## 2021-07-28 PROCEDURE — 3074F PR MOST RECENT SYSTOLIC BLOOD PRESSURE < 130 MM HG: ICD-10-PCS | Mod: CPTII,S$GLB,, | Performed by: INTERNAL MEDICINE

## 2021-07-28 PROCEDURE — 1160F PR REVIEW ALL MEDS BY PRESCRIBER/CLIN PHARMACIST DOCUMENTED: ICD-10-PCS | Mod: CPTII,S$GLB,, | Performed by: INTERNAL MEDICINE

## 2021-07-28 PROCEDURE — 36415 COLL VENOUS BLD VENIPUNCTURE: CPT | Performed by: INTERNAL MEDICINE

## 2021-07-28 PROCEDURE — 1126F PR PAIN SEVERITY QUANTIFIED, NO PAIN PRESENT: ICD-10-PCS | Mod: CPTII,S$GLB,, | Performed by: INTERNAL MEDICINE

## 2021-07-28 PROCEDURE — 1160F PR REVIEW ALL MEDS BY PRESCRIBER/CLIN PHARMACIST DOCUMENTED: ICD-10-PCS | Mod: CPTII,95,, | Performed by: PHYSICIAN ASSISTANT

## 2021-07-28 PROCEDURE — 99214 OFFICE O/P EST MOD 30 MIN: CPT | Mod: S$GLB,,, | Performed by: INTERNAL MEDICINE

## 2021-07-28 PROCEDURE — 3288F PR FALLS RISK ASSESSMENT DOCUMENTED: ICD-10-PCS | Mod: CPTII,S$GLB,, | Performed by: INTERNAL MEDICINE

## 2021-07-28 PROCEDURE — 99204 OFFICE O/P NEW MOD 45 MIN: CPT | Mod: 95,,, | Performed by: PHYSICIAN ASSISTANT

## 2021-07-28 PROCEDURE — 99214 PR OFFICE/OUTPT VISIT, EST, LEVL IV, 30-39 MIN: ICD-10-PCS | Mod: S$GLB,,, | Performed by: INTERNAL MEDICINE

## 2021-07-28 PROCEDURE — 1159F PR MEDICATION LIST DOCUMENTED IN MEDICAL RECORD: ICD-10-PCS | Mod: CPTII,95,, | Performed by: PHYSICIAN ASSISTANT

## 2021-07-28 PROCEDURE — 1101F PT FALLS ASSESS-DOCD LE1/YR: CPT | Mod: CPTII,S$GLB,, | Performed by: INTERNAL MEDICINE

## 2021-07-28 PROCEDURE — 3051F PR MOST RECENT HEMOGLOBIN A1C LEVEL 7.0 - < 8.0%: ICD-10-PCS | Mod: CPTII,S$GLB,, | Performed by: INTERNAL MEDICINE

## 2021-07-28 PROCEDURE — 1101F PR PT FALLS ASSESS DOC 0-1 FALLS W/OUT INJ PAST YR: ICD-10-PCS | Mod: CPTII,S$GLB,, | Performed by: INTERNAL MEDICINE

## 2021-07-28 PROCEDURE — 3074F SYST BP LT 130 MM HG: CPT | Mod: CPTII,S$GLB,, | Performed by: INTERNAL MEDICINE

## 2021-07-28 PROCEDURE — 3051F HG A1C>EQUAL 7.0%<8.0%: CPT | Mod: CPTII,95,, | Performed by: PHYSICIAN ASSISTANT

## 2021-07-28 PROCEDURE — 3051F PR MOST RECENT HEMOGLOBIN A1C LEVEL 7.0 - < 8.0%: ICD-10-PCS | Mod: CPTII,95,, | Performed by: PHYSICIAN ASSISTANT

## 2021-07-28 PROCEDURE — 1126F AMNT PAIN NOTED NONE PRSNT: CPT | Mod: CPTII,S$GLB,, | Performed by: INTERNAL MEDICINE

## 2021-07-28 PROCEDURE — 3008F BODY MASS INDEX DOCD: CPT | Mod: CPTII,S$GLB,, | Performed by: INTERNAL MEDICINE

## 2021-07-28 PROCEDURE — 86480 TB TEST CELL IMMUN MEASURE: CPT | Performed by: INTERNAL MEDICINE

## 2021-07-28 PROCEDURE — 1159F MED LIST DOCD IN RCRD: CPT | Mod: CPTII,95,, | Performed by: PHYSICIAN ASSISTANT

## 2021-07-28 PROCEDURE — 1159F PR MEDICATION LIST DOCUMENTED IN MEDICAL RECORD: ICD-10-PCS | Mod: CPTII,S$GLB,, | Performed by: INTERNAL MEDICINE

## 2021-07-28 RX ORDER — INSULIN ASPART 100 [IU]/ML
30 INJECTION, SOLUTION INTRAVENOUS; SUBCUTANEOUS
Qty: 30 ML | Refills: 11 | Status: SHIPPED | OUTPATIENT
Start: 2021-07-28 | End: 2022-01-10

## 2021-07-28 RX ORDER — DULAGLUTIDE 3 MG/.5ML
3 INJECTION, SOLUTION SUBCUTANEOUS
Qty: 4 PEN | Refills: 11 | Status: SHIPPED | OUTPATIENT
Start: 2021-07-28 | End: 2021-11-03

## 2021-07-29 ENCOUNTER — TELEPHONE (OUTPATIENT)
Dept: ORTHOPEDICS | Facility: CLINIC | Age: 66
End: 2021-07-29

## 2021-07-29 ENCOUNTER — PATIENT MESSAGE (OUTPATIENT)
Dept: ORTHOPEDICS | Facility: CLINIC | Age: 66
End: 2021-07-29

## 2021-07-29 DIAGNOSIS — M25.561 RIGHT KNEE PAIN, UNSPECIFIED CHRONICITY: Primary | ICD-10-CM

## 2021-07-29 DIAGNOSIS — M25.461 SWELLING OF RIGHT KNEE JOINT: Primary | ICD-10-CM

## 2021-07-30 ENCOUNTER — PATIENT MESSAGE (OUTPATIENT)
Dept: DIABETES | Facility: CLINIC | Age: 66
End: 2021-07-30

## 2021-07-30 ENCOUNTER — OFFICE VISIT (OUTPATIENT)
Dept: ORTHOPEDICS | Facility: CLINIC | Age: 66
End: 2021-07-30
Payer: MEDICARE

## 2021-07-30 ENCOUNTER — HOSPITAL ENCOUNTER (OUTPATIENT)
Dept: RADIOLOGY | Facility: HOSPITAL | Age: 66
Discharge: HOME | End: 2021-07-30
Attending: PHYSICIAN ASSISTANT
Payer: MEDICARE

## 2021-07-30 ENCOUNTER — PATIENT MESSAGE (OUTPATIENT)
Dept: CARDIOLOGY | Facility: CLINIC | Age: 66
End: 2021-07-30

## 2021-07-30 VITALS — WEIGHT: 251.31 LBS | BODY MASS INDEX: 34.04 KG/M2 | HEIGHT: 72 IN

## 2021-07-30 DIAGNOSIS — M25.461 SWELLING OF RIGHT KNEE JOINT: ICD-10-CM

## 2021-07-30 DIAGNOSIS — M25.461 SWELLING OF RIGHT KNEE JOINT: Primary | ICD-10-CM

## 2021-07-30 DIAGNOSIS — M25.562 PAIN IN BOTH KNEES, UNSPECIFIED CHRONICITY: ICD-10-CM

## 2021-07-30 DIAGNOSIS — M06.9 RHEUMATOID ARTHRITIS INVOLVING BOTH KNEES, UNSPECIFIED WHETHER RHEUMATOID FACTOR PRESENT: Primary | ICD-10-CM

## 2021-07-30 DIAGNOSIS — L98.9 SKIN LESION OF LEFT ARM: ICD-10-CM

## 2021-07-30 DIAGNOSIS — M25.561 PAIN IN BOTH KNEES, UNSPECIFIED CHRONICITY: ICD-10-CM

## 2021-07-30 PROCEDURE — 1101F PR PT FALLS ASSESS DOC 0-1 FALLS W/OUT INJ PAST YR: ICD-10-PCS | Mod: CPTII,S$GLB,, | Performed by: PHYSICIAN ASSISTANT

## 2021-07-30 PROCEDURE — 73564 XR KNEE ORTHO RIGHT WITH FLEXION: ICD-10-PCS | Mod: 26,RT,, | Performed by: RADIOLOGY

## 2021-07-30 PROCEDURE — 3051F HG A1C>EQUAL 7.0%<8.0%: CPT | Mod: CPTII,S$GLB,, | Performed by: PHYSICIAN ASSISTANT

## 2021-07-30 PROCEDURE — 3008F BODY MASS INDEX DOCD: CPT | Mod: CPTII,S$GLB,, | Performed by: PHYSICIAN ASSISTANT

## 2021-07-30 PROCEDURE — 73564 X-RAY EXAM KNEE 4 OR MORE: CPT | Mod: 26,RT,, | Performed by: RADIOLOGY

## 2021-07-30 PROCEDURE — 99999 PR PBB SHADOW E&M-EST. PATIENT-LVL IV: ICD-10-PCS | Mod: PBBFAC,,, | Performed by: PHYSICIAN ASSISTANT

## 2021-07-30 PROCEDURE — 1159F PR MEDICATION LIST DOCUMENTED IN MEDICAL RECORD: ICD-10-PCS | Mod: CPTII,S$GLB,, | Performed by: PHYSICIAN ASSISTANT

## 2021-07-30 PROCEDURE — 1159F MED LIST DOCD IN RCRD: CPT | Mod: CPTII,S$GLB,, | Performed by: PHYSICIAN ASSISTANT

## 2021-07-30 PROCEDURE — 73562 X-RAY EXAM OF KNEE 3: CPT | Mod: 26,LT,, | Performed by: RADIOLOGY

## 2021-07-30 PROCEDURE — 73562 XR KNEE ORTHO RIGHT WITH FLEXION: ICD-10-PCS | Mod: 26,LT,, | Performed by: RADIOLOGY

## 2021-07-30 PROCEDURE — 3288F PR FALLS RISK ASSESSMENT DOCUMENTED: ICD-10-PCS | Mod: CPTII,S$GLB,, | Performed by: PHYSICIAN ASSISTANT

## 2021-07-30 PROCEDURE — 3051F PR MOST RECENT HEMOGLOBIN A1C LEVEL 7.0 - < 8.0%: ICD-10-PCS | Mod: CPTII,S$GLB,, | Performed by: PHYSICIAN ASSISTANT

## 2021-07-30 PROCEDURE — 99203 OFFICE O/P NEW LOW 30 MIN: CPT | Mod: S$GLB,,, | Performed by: PHYSICIAN ASSISTANT

## 2021-07-30 PROCEDURE — 1126F PR PAIN SEVERITY QUANTIFIED, NO PAIN PRESENT: ICD-10-PCS | Mod: CPTII,S$GLB,, | Performed by: PHYSICIAN ASSISTANT

## 2021-07-30 PROCEDURE — 1126F AMNT PAIN NOTED NONE PRSNT: CPT | Mod: CPTII,S$GLB,, | Performed by: PHYSICIAN ASSISTANT

## 2021-07-30 PROCEDURE — 1101F PT FALLS ASSESS-DOCD LE1/YR: CPT | Mod: CPTII,S$GLB,, | Performed by: PHYSICIAN ASSISTANT

## 2021-07-30 PROCEDURE — 3008F PR BODY MASS INDEX (BMI) DOCUMENTED: ICD-10-PCS | Mod: CPTII,S$GLB,, | Performed by: PHYSICIAN ASSISTANT

## 2021-07-30 PROCEDURE — 99999 PR PBB SHADOW E&M-EST. PATIENT-LVL IV: CPT | Mod: PBBFAC,,, | Performed by: PHYSICIAN ASSISTANT

## 2021-07-30 PROCEDURE — 3288F FALL RISK ASSESSMENT DOCD: CPT | Mod: CPTII,S$GLB,, | Performed by: PHYSICIAN ASSISTANT

## 2021-07-30 PROCEDURE — 73564 X-RAY EXAM KNEE 4 OR MORE: CPT | Mod: TC,RT

## 2021-07-30 PROCEDURE — 99203 PR OFFICE/OUTPT VISIT, NEW, LEVL III, 30-44 MIN: ICD-10-PCS | Mod: S$GLB,,, | Performed by: PHYSICIAN ASSISTANT

## 2021-08-04 LAB
GAMMA INTERFERON BACKGROUND BLD IA-ACNC: 0.05 IU/ML
M TB IFN-G CD4+ BCKGRND COR BLD-ACNC: 0 IU/ML
MITOGEN IGNF BCKGRD COR BLD-ACNC: 9.62 IU/ML
TB GOLD PLUS: NEGATIVE
TB2 - NIL: -0.01 IU/ML

## 2021-08-06 ENCOUNTER — PATIENT MESSAGE (OUTPATIENT)
Dept: ENDOCRINOLOGY | Facility: CLINIC | Age: 66
End: 2021-08-06

## 2021-08-06 DIAGNOSIS — Z79.4 TYPE 2 DIABETES MELLITUS WITH HYPERGLYCEMIA, WITH LONG-TERM CURRENT USE OF INSULIN: ICD-10-CM

## 2021-08-06 DIAGNOSIS — E11.65 TYPE 2 DIABETES MELLITUS WITH HYPERGLYCEMIA, WITH LONG-TERM CURRENT USE OF INSULIN: ICD-10-CM

## 2021-08-09 ENCOUNTER — TELEPHONE (OUTPATIENT)
Dept: CARDIOLOGY | Facility: CLINIC | Age: 66
End: 2021-08-09

## 2021-08-09 ENCOUNTER — OFFICE VISIT (OUTPATIENT)
Dept: CARDIOLOGY | Facility: CLINIC | Age: 66
End: 2021-08-09
Payer: MEDICARE

## 2021-08-09 ENCOUNTER — PATIENT MESSAGE (OUTPATIENT)
Dept: INFECTIOUS DISEASES | Facility: CLINIC | Age: 66
End: 2021-08-09

## 2021-08-09 VITALS
HEART RATE: 84 BPM | WEIGHT: 256.38 LBS | DIASTOLIC BLOOD PRESSURE: 84 MMHG | SYSTOLIC BLOOD PRESSURE: 136 MMHG | OXYGEN SATURATION: 93 % | BODY MASS INDEX: 34.77 KG/M2

## 2021-08-09 DIAGNOSIS — I25.10 ATHEROSCLEROSIS OF NATIVE CORONARY ARTERY OF NATIVE HEART WITHOUT ANGINA PECTORIS: Primary | ICD-10-CM

## 2021-08-09 DIAGNOSIS — M06.9 RHEUMATOID ARTHRITIS FLARE: ICD-10-CM

## 2021-08-09 DIAGNOSIS — I70.209 ATHEROSCLEROTIC PERIPHERAL VASCULAR DISEASE: ICD-10-CM

## 2021-08-09 DIAGNOSIS — Z79.4 TYPE 2 DIABETES MELLITUS WITH HYPERGLYCEMIA, WITH LONG-TERM CURRENT USE OF INSULIN: ICD-10-CM

## 2021-08-09 DIAGNOSIS — E11.65 TYPE 2 DIABETES MELLITUS WITH HYPERGLYCEMIA, WITH LONG-TERM CURRENT USE OF INSULIN: ICD-10-CM

## 2021-08-09 DIAGNOSIS — J44.9 CHRONIC OBSTRUCTIVE PULMONARY DISEASE, UNSPECIFIED COPD TYPE: ICD-10-CM

## 2021-08-09 DIAGNOSIS — I10 ESSENTIAL HYPERTENSION: ICD-10-CM

## 2021-08-09 DIAGNOSIS — E66.01 SEVERE OBESITY: ICD-10-CM

## 2021-08-09 DIAGNOSIS — I50.22 CHRONIC SYSTOLIC HEART FAILURE: ICD-10-CM

## 2021-08-09 PROCEDURE — 99999 PR PBB SHADOW E&M-EST. PATIENT-LVL III: CPT | Mod: PBBFAC,,, | Performed by: INTERNAL MEDICINE

## 2021-08-09 PROCEDURE — 1101F PR PT FALLS ASSESS DOC 0-1 FALLS W/OUT INJ PAST YR: ICD-10-PCS | Mod: CPTII,S$GLB,, | Performed by: INTERNAL MEDICINE

## 2021-08-09 PROCEDURE — 3075F PR MOST RECENT SYSTOLIC BLOOD PRESS GE 130-139MM HG: ICD-10-PCS | Mod: CPTII,S$GLB,, | Performed by: INTERNAL MEDICINE

## 2021-08-09 PROCEDURE — 1126F AMNT PAIN NOTED NONE PRSNT: CPT | Mod: CPTII,S$GLB,, | Performed by: INTERNAL MEDICINE

## 2021-08-09 PROCEDURE — 3075F SYST BP GE 130 - 139MM HG: CPT | Mod: CPTII,S$GLB,, | Performed by: INTERNAL MEDICINE

## 2021-08-09 PROCEDURE — 1159F PR MEDICATION LIST DOCUMENTED IN MEDICAL RECORD: ICD-10-PCS | Mod: CPTII,S$GLB,, | Performed by: INTERNAL MEDICINE

## 2021-08-09 PROCEDURE — 3008F BODY MASS INDEX DOCD: CPT | Mod: CPTII,S$GLB,, | Performed by: INTERNAL MEDICINE

## 2021-08-09 PROCEDURE — 99214 OFFICE O/P EST MOD 30 MIN: CPT | Mod: S$GLB,,, | Performed by: INTERNAL MEDICINE

## 2021-08-09 PROCEDURE — 99499 UNLISTED E&M SERVICE: CPT | Mod: S$GLB,,, | Performed by: INTERNAL MEDICINE

## 2021-08-09 PROCEDURE — 1101F PT FALLS ASSESS-DOCD LE1/YR: CPT | Mod: CPTII,S$GLB,, | Performed by: INTERNAL MEDICINE

## 2021-08-09 PROCEDURE — 1160F PR REVIEW ALL MEDS BY PRESCRIBER/CLIN PHARMACIST DOCUMENTED: ICD-10-PCS | Mod: CPTII,S$GLB,, | Performed by: INTERNAL MEDICINE

## 2021-08-09 PROCEDURE — 99999 PR PBB SHADOW E&M-EST. PATIENT-LVL III: ICD-10-PCS | Mod: PBBFAC,,, | Performed by: INTERNAL MEDICINE

## 2021-08-09 PROCEDURE — 1126F PR PAIN SEVERITY QUANTIFIED, NO PAIN PRESENT: ICD-10-PCS | Mod: CPTII,S$GLB,, | Performed by: INTERNAL MEDICINE

## 2021-08-09 PROCEDURE — 1160F RVW MEDS BY RX/DR IN RCRD: CPT | Mod: CPTII,S$GLB,, | Performed by: INTERNAL MEDICINE

## 2021-08-09 PROCEDURE — 3051F HG A1C>EQUAL 7.0%<8.0%: CPT | Mod: CPTII,S$GLB,, | Performed by: INTERNAL MEDICINE

## 2021-08-09 PROCEDURE — 3008F PR BODY MASS INDEX (BMI) DOCUMENTED: ICD-10-PCS | Mod: CPTII,S$GLB,, | Performed by: INTERNAL MEDICINE

## 2021-08-09 PROCEDURE — 3079F DIAST BP 80-89 MM HG: CPT | Mod: CPTII,S$GLB,, | Performed by: INTERNAL MEDICINE

## 2021-08-09 PROCEDURE — 3288F FALL RISK ASSESSMENT DOCD: CPT | Mod: CPTII,S$GLB,, | Performed by: INTERNAL MEDICINE

## 2021-08-09 PROCEDURE — 3051F PR MOST RECENT HEMOGLOBIN A1C LEVEL 7.0 - < 8.0%: ICD-10-PCS | Mod: CPTII,S$GLB,, | Performed by: INTERNAL MEDICINE

## 2021-08-09 PROCEDURE — 3079F PR MOST RECENT DIASTOLIC BLOOD PRESSURE 80-89 MM HG: ICD-10-PCS | Mod: CPTII,S$GLB,, | Performed by: INTERNAL MEDICINE

## 2021-08-09 PROCEDURE — 3288F PR FALLS RISK ASSESSMENT DOCUMENTED: ICD-10-PCS | Mod: CPTII,S$GLB,, | Performed by: INTERNAL MEDICINE

## 2021-08-09 PROCEDURE — 1159F MED LIST DOCD IN RCRD: CPT | Mod: CPTII,S$GLB,, | Performed by: INTERNAL MEDICINE

## 2021-08-09 PROCEDURE — 99499 RISK ADDL DX/OHS AUDIT: ICD-10-PCS | Mod: S$GLB,,, | Performed by: INTERNAL MEDICINE

## 2021-08-09 PROCEDURE — 99214 PR OFFICE/OUTPT VISIT, EST, LEVL IV, 30-39 MIN: ICD-10-PCS | Mod: S$GLB,,, | Performed by: INTERNAL MEDICINE

## 2021-08-09 RX ORDER — OLMESARTAN MEDOXOMIL 40 MG/1
40 TABLET ORAL DAILY
Qty: 90 TABLET | Refills: 3 | Status: SHIPPED | OUTPATIENT
Start: 2021-08-09 | End: 2021-12-23 | Stop reason: SDUPTHER

## 2021-08-09 RX ORDER — SPIRONOLACTONE 25 MG/1
25 TABLET ORAL DAILY
Qty: 90 TABLET | Refills: 3 | Status: SHIPPED | OUTPATIENT
Start: 2021-08-09 | End: 2022-07-19

## 2021-08-09 RX ORDER — CARVEDILOL 25 MG/1
25 TABLET ORAL 2 TIMES DAILY
Qty: 180 TABLET | Refills: 3 | Status: SHIPPED | OUTPATIENT
Start: 2021-08-09 | End: 2022-08-20

## 2021-08-10 ENCOUNTER — HOSPITAL ENCOUNTER (OUTPATIENT)
Dept: RADIOLOGY | Facility: OTHER | Age: 66
Discharge: HOME | End: 2021-08-10
Attending: INTERNAL MEDICINE
Payer: MEDICARE

## 2021-08-10 ENCOUNTER — PATIENT MESSAGE (OUTPATIENT)
Dept: RHEUMATOLOGY | Facility: CLINIC | Age: 66
End: 2021-08-10

## 2021-08-10 ENCOUNTER — HOSPITAL ENCOUNTER (OUTPATIENT)
Dept: CARDIOLOGY | Facility: OTHER | Age: 66
Discharge: HOME | End: 2021-08-10
Attending: INTERNAL MEDICINE
Payer: MEDICARE

## 2021-08-10 ENCOUNTER — PATIENT MESSAGE (OUTPATIENT)
Dept: INFECTIOUS DISEASES | Facility: CLINIC | Age: 66
End: 2021-08-10

## 2021-08-10 DIAGNOSIS — J44.9 CHRONIC OBSTRUCTIVE PULMONARY DISEASE, UNSPECIFIED COPD TYPE: ICD-10-CM

## 2021-08-10 DIAGNOSIS — I70.209 ATHEROSCLEROTIC PERIPHERAL VASCULAR DISEASE: ICD-10-CM

## 2021-08-10 PROCEDURE — 93926 LOWER EXTREMITY STUDY: CPT | Mod: 26,RT,, | Performed by: INTERNAL MEDICINE

## 2021-08-10 PROCEDURE — 72100 X-RAY EXAM L-S SPINE 2/3 VWS: CPT | Mod: 26,,, | Performed by: RADIOLOGY

## 2021-08-10 PROCEDURE — 72100 X-RAY EXAM L-S SPINE 2/3 VWS: CPT | Mod: TC,FY

## 2021-08-10 PROCEDURE — 72100 XR LUMBAR SPINE AP AND LATERAL: ICD-10-PCS | Mod: 26,,, | Performed by: RADIOLOGY

## 2021-08-10 PROCEDURE — 71046 XR CHEST PA AND LATERAL: ICD-10-PCS | Mod: 26,,, | Performed by: RADIOLOGY

## 2021-08-10 PROCEDURE — 71046 X-RAY EXAM CHEST 2 VIEWS: CPT | Mod: 26,,, | Performed by: RADIOLOGY

## 2021-08-10 PROCEDURE — 93926 CV US DOPPLER ARTERIAL LEG RIGHT (CUPID ONLY): ICD-10-PCS | Mod: 26,RT,, | Performed by: INTERNAL MEDICINE

## 2021-08-10 PROCEDURE — 71046 X-RAY EXAM CHEST 2 VIEWS: CPT | Mod: TC,FY

## 2021-08-10 PROCEDURE — 93926 LOWER EXTREMITY STUDY: CPT | Mod: RT

## 2021-08-11 LAB
RIGHT CFA PSV: 109 CM/S
RIGHT POPLITEAL PSV: 69 CM/S
RIGHT PROFUNDA SYS PSV: 95 CM/S
RIGHT SUPER FEMORAL DIST SYS PSV: 64 CM/S
RIGHT SUPER FEMORAL MID SYS PSV: 85 CM/S
RIGHT SUPER FEMORAL PROX SYS PSV: 58 CM/S

## 2021-08-12 ENCOUNTER — PATIENT MESSAGE (OUTPATIENT)
Dept: RHEUMATOLOGY | Facility: CLINIC | Age: 66
End: 2021-08-12

## 2021-08-13 ENCOUNTER — CLINICAL SUPPORT (OUTPATIENT)
Dept: INFECTIOUS DISEASES | Facility: CLINIC | Age: 66
End: 2021-08-13
Payer: MEDICARE

## 2021-08-13 ENCOUNTER — LAB VISIT (OUTPATIENT)
Dept: LAB | Facility: HOSPITAL | Age: 66
End: 2021-08-13
Payer: MEDICARE

## 2021-08-13 DIAGNOSIS — D84.9 IMMUNOCOMPROMISED, ACQUIRED: ICD-10-CM

## 2021-08-13 DIAGNOSIS — M06.9 RHEUMATOID ARTHRITIS INVOLVING MULTIPLE JOINTS: ICD-10-CM

## 2021-08-13 PROCEDURE — 86682 HELMINTH ANTIBODY: CPT | Performed by: PHYSICIAN ASSISTANT

## 2021-08-13 PROCEDURE — G0009 ADMIN PNEUMOCOCCAL VACCINE: HCPCS | Mod: S$GLB,,, | Performed by: INTERNAL MEDICINE

## 2021-08-13 PROCEDURE — G0009 PNEUMOCOCCAL CONJUGATE VACCINE 13-VALENT LESS THAN 5YO & GREATER THAN: ICD-10-PCS | Mod: S$GLB,,, | Performed by: INTERNAL MEDICINE

## 2021-08-13 PROCEDURE — 99999 PR PBB SHADOW E&M-EST. PATIENT-LVL III: CPT | Mod: PBBFAC,,,

## 2021-08-13 PROCEDURE — 36415 COLL VENOUS BLD VENIPUNCTURE: CPT | Performed by: PHYSICIAN ASSISTANT

## 2021-08-13 PROCEDURE — 90670 PNEUMOCOCCAL CONJUGATE VACCINE 13-VALENT LESS THAN 5YO & GREATER THAN: ICD-10-PCS | Mod: S$GLB,,, | Performed by: INTERNAL MEDICINE

## 2021-08-13 PROCEDURE — 99999 PR PBB SHADOW E&M-EST. PATIENT-LVL III: ICD-10-PCS | Mod: PBBFAC,,,

## 2021-08-13 PROCEDURE — 90670 PCV13 VACCINE IM: CPT | Mod: S$GLB,,, | Performed by: INTERNAL MEDICINE

## 2021-08-16 ENCOUNTER — PATIENT MESSAGE (OUTPATIENT)
Dept: DIABETES | Facility: CLINIC | Age: 66
End: 2021-08-16

## 2021-08-16 ENCOUNTER — TELEPHONE (OUTPATIENT)
Dept: INFECTIOUS DISEASES | Facility: CLINIC | Age: 66
End: 2021-08-16

## 2021-08-16 ENCOUNTER — PATIENT MESSAGE (OUTPATIENT)
Dept: INFECTIOUS DISEASES | Facility: CLINIC | Age: 66
End: 2021-08-16

## 2021-08-16 ENCOUNTER — CLINICAL SUPPORT (OUTPATIENT)
Dept: INFECTIOUS DISEASES | Facility: CLINIC | Age: 66
End: 2021-08-16
Payer: MEDICARE

## 2021-08-16 LAB — STRONGYLOIDES ANTIBODY IGG: NEGATIVE

## 2021-08-16 PROCEDURE — 90632 HEPA VACCINE ADULT IM: CPT | Mod: S$GLB,,, | Performed by: PHYSICIAN ASSISTANT

## 2021-08-16 PROCEDURE — 99999 PR PBB SHADOW E&M-EST. PATIENT-LVL III: ICD-10-PCS | Mod: PBBFAC,,,

## 2021-08-16 PROCEDURE — 99999 PR PBB SHADOW E&M-EST. PATIENT-LVL III: CPT | Mod: PBBFAC,,,

## 2021-08-16 PROCEDURE — 90471 IMMUNIZATION ADMIN: CPT | Mod: S$GLB,,, | Performed by: PHYSICIAN ASSISTANT

## 2021-08-16 PROCEDURE — 90632 HEPATITIS A VACCINE ADULT IM: ICD-10-PCS | Mod: S$GLB,,, | Performed by: PHYSICIAN ASSISTANT

## 2021-08-16 PROCEDURE — 90471 HEPATITIS A VACCINE ADULT IM: ICD-10-PCS | Mod: S$GLB,,, | Performed by: PHYSICIAN ASSISTANT

## 2021-08-17 ENCOUNTER — PATIENT MESSAGE (OUTPATIENT)
Dept: RHEUMATOLOGY | Facility: CLINIC | Age: 66
End: 2021-08-17

## 2021-08-18 ENCOUNTER — PATIENT MESSAGE (OUTPATIENT)
Dept: INFECTIOUS DISEASES | Facility: CLINIC | Age: 66
End: 2021-08-18

## 2021-08-18 ENCOUNTER — OFFICE VISIT (OUTPATIENT)
Dept: URGENT CARE | Facility: CLINIC | Age: 66
End: 2021-08-18
Payer: MEDICARE

## 2021-08-18 ENCOUNTER — PATIENT MESSAGE (OUTPATIENT)
Dept: RHEUMATOLOGY | Facility: CLINIC | Age: 66
End: 2021-08-18

## 2021-08-18 ENCOUNTER — TELEPHONE (OUTPATIENT)
Dept: INFECTIOUS DISEASES | Facility: CLINIC | Age: 66
End: 2021-08-18

## 2021-08-18 VITALS
BODY MASS INDEX: 34.67 KG/M2 | RESPIRATION RATE: 16 BRPM | TEMPERATURE: 98 F | OXYGEN SATURATION: 96 % | DIASTOLIC BLOOD PRESSURE: 89 MMHG | HEART RATE: 93 BPM | HEIGHT: 72 IN | WEIGHT: 256 LBS | SYSTOLIC BLOOD PRESSURE: 138 MMHG

## 2021-08-18 DIAGNOSIS — J02.9 SORE THROAT: Primary | ICD-10-CM

## 2021-08-18 DIAGNOSIS — M06.9 RHEUMATOID ARTHRITIS INVOLVING MULTIPLE JOINTS: ICD-10-CM

## 2021-08-18 DIAGNOSIS — R09.02 HYPOXIA: ICD-10-CM

## 2021-08-18 DIAGNOSIS — R06.02 SOB (SHORTNESS OF BREATH): ICD-10-CM

## 2021-08-18 LAB
CTP QC/QA: YES
SARS-COV-2 RDRP RESP QL NAA+PROBE: NEGATIVE

## 2021-08-18 PROCEDURE — 1160F RVW MEDS BY RX/DR IN RCRD: CPT | Mod: CPTII,S$GLB,, | Performed by: NURSE PRACTITIONER

## 2021-08-18 PROCEDURE — 1159F MED LIST DOCD IN RCRD: CPT | Mod: CPTII,S$GLB,, | Performed by: NURSE PRACTITIONER

## 2021-08-18 PROCEDURE — 3075F SYST BP GE 130 - 139MM HG: CPT | Mod: CPTII,S$GLB,, | Performed by: NURSE PRACTITIONER

## 2021-08-18 PROCEDURE — 99213 OFFICE O/P EST LOW 20 MIN: CPT | Mod: S$GLB,,, | Performed by: NURSE PRACTITIONER

## 2021-08-18 PROCEDURE — 3075F PR MOST RECENT SYSTOLIC BLOOD PRESS GE 130-139MM HG: ICD-10-PCS | Mod: CPTII,S$GLB,, | Performed by: NURSE PRACTITIONER

## 2021-08-18 PROCEDURE — 71046 XR CHEST PA AND LATERAL: ICD-10-PCS | Mod: FY,S$GLB,, | Performed by: RADIOLOGY

## 2021-08-18 PROCEDURE — 3079F DIAST BP 80-89 MM HG: CPT | Mod: CPTII,S$GLB,, | Performed by: NURSE PRACTITIONER

## 2021-08-18 PROCEDURE — 3051F HG A1C>EQUAL 7.0%<8.0%: CPT | Mod: CPTII,S$GLB,, | Performed by: NURSE PRACTITIONER

## 2021-08-18 PROCEDURE — 1160F PR REVIEW ALL MEDS BY PRESCRIBER/CLIN PHARMACIST DOCUMENTED: ICD-10-PCS | Mod: CPTII,S$GLB,, | Performed by: NURSE PRACTITIONER

## 2021-08-18 PROCEDURE — 3008F PR BODY MASS INDEX (BMI) DOCUMENTED: ICD-10-PCS | Mod: CPTII,S$GLB,, | Performed by: NURSE PRACTITIONER

## 2021-08-18 PROCEDURE — 3051F PR MOST RECENT HEMOGLOBIN A1C LEVEL 7.0 - < 8.0%: ICD-10-PCS | Mod: CPTII,S$GLB,, | Performed by: NURSE PRACTITIONER

## 2021-08-18 PROCEDURE — 99213 PR OFFICE/OUTPT VISIT, EST, LEVL III, 20-29 MIN: ICD-10-PCS | Mod: S$GLB,,, | Performed by: NURSE PRACTITIONER

## 2021-08-18 PROCEDURE — 3079F PR MOST RECENT DIASTOLIC BLOOD PRESSURE 80-89 MM HG: ICD-10-PCS | Mod: CPTII,S$GLB,, | Performed by: NURSE PRACTITIONER

## 2021-08-18 PROCEDURE — 1159F PR MEDICATION LIST DOCUMENTED IN MEDICAL RECORD: ICD-10-PCS | Mod: CPTII,S$GLB,, | Performed by: NURSE PRACTITIONER

## 2021-08-18 PROCEDURE — U0002: ICD-10-PCS | Mod: QW,S$GLB,, | Performed by: NURSE PRACTITIONER

## 2021-08-18 PROCEDURE — 71046 X-RAY EXAM CHEST 2 VIEWS: CPT | Mod: FY,S$GLB,, | Performed by: RADIOLOGY

## 2021-08-18 PROCEDURE — 3008F BODY MASS INDEX DOCD: CPT | Mod: CPTII,S$GLB,, | Performed by: NURSE PRACTITIONER

## 2021-08-18 PROCEDURE — U0002 COVID-19 LAB TEST NON-CDC: HCPCS | Mod: QW,S$GLB,, | Performed by: NURSE PRACTITIONER

## 2021-08-18 RX ORDER — SULFAMETHOXAZOLE AND TRIMETHOPRIM 800; 160 MG/1; MG/1
1 TABLET ORAL
Qty: 36 TABLET | Refills: 1 | Status: SHIPPED | OUTPATIENT
Start: 2021-08-18 | End: 2021-12-22

## 2021-08-18 RX ORDER — PREDNISONE 5 MG/1
5 TABLET ORAL DAILY
Qty: 90 TABLET | Refills: 1 | Status: SHIPPED | OUTPATIENT
Start: 2021-08-18 | End: 2022-01-31

## 2021-08-18 RX ORDER — PREDNISONE 2.5 MG/1
2.5 TABLET ORAL DAILY
Qty: 90 TABLET | Refills: 3 | Status: ON HOLD | OUTPATIENT
Start: 2021-08-18 | End: 2023-01-18 | Stop reason: HOSPADM

## 2021-08-19 ENCOUNTER — PATIENT MESSAGE (OUTPATIENT)
Dept: RHEUMATOLOGY | Facility: CLINIC | Age: 66
End: 2021-08-19

## 2021-08-20 ENCOUNTER — OFFICE VISIT (OUTPATIENT)
Dept: URGENT CARE | Facility: CLINIC | Age: 66
End: 2021-08-20
Payer: MEDICARE

## 2021-08-20 VITALS
OXYGEN SATURATION: 93 % | TEMPERATURE: 98 F | DIASTOLIC BLOOD PRESSURE: 110 MMHG | HEART RATE: 94 BPM | RESPIRATION RATE: 20 BRPM | SYSTOLIC BLOOD PRESSURE: 178 MMHG

## 2021-08-20 DIAGNOSIS — J98.01 BRONCHOSPASM: Primary | ICD-10-CM

## 2021-08-20 DIAGNOSIS — R53.83 FATIGUE, UNSPECIFIED TYPE: ICD-10-CM

## 2021-08-20 DIAGNOSIS — H66.91 RIGHT OTITIS MEDIA, UNSPECIFIED OTITIS MEDIA TYPE: ICD-10-CM

## 2021-08-20 DIAGNOSIS — J40 BRONCHITIS: ICD-10-CM

## 2021-08-20 DIAGNOSIS — R05.9 COUGH: ICD-10-CM

## 2021-08-20 LAB
CTP QC/QA: YES
SARS-COV-2 RDRP RESP QL NAA+PROBE: NEGATIVE

## 2021-08-20 PROCEDURE — 1160F PR REVIEW ALL MEDS BY PRESCRIBER/CLIN PHARMACIST DOCUMENTED: ICD-10-PCS | Mod: CPTII,S$GLB,, | Performed by: FAMILY MEDICINE

## 2021-08-20 PROCEDURE — 94640 AIRWAY INHALATION TREATMENT: CPT | Mod: S$GLB,,, | Performed by: FAMILY MEDICINE

## 2021-08-20 PROCEDURE — 94640 PR INHAL RX, AIRWAY OBST/DX SPUTUM INDUCT: ICD-10-PCS | Mod: S$GLB,,, | Performed by: FAMILY MEDICINE

## 2021-08-20 PROCEDURE — 99215 PR OFFICE/OUTPT VISIT, EST, LEVL V, 40-54 MIN: ICD-10-PCS | Mod: 25,S$GLB,, | Performed by: FAMILY MEDICINE

## 2021-08-20 PROCEDURE — 1159F PR MEDICATION LIST DOCUMENTED IN MEDICAL RECORD: ICD-10-PCS | Mod: CPTII,S$GLB,, | Performed by: FAMILY MEDICINE

## 2021-08-20 PROCEDURE — 3077F SYST BP >= 140 MM HG: CPT | Mod: CPTII,S$GLB,, | Performed by: FAMILY MEDICINE

## 2021-08-20 PROCEDURE — 3051F PR MOST RECENT HEMOGLOBIN A1C LEVEL 7.0 - < 8.0%: ICD-10-PCS | Mod: CPTII,S$GLB,, | Performed by: FAMILY MEDICINE

## 2021-08-20 PROCEDURE — U0002 COVID-19 LAB TEST NON-CDC: HCPCS | Mod: QW,S$GLB,, | Performed by: FAMILY MEDICINE

## 2021-08-20 PROCEDURE — 3080F DIAST BP >= 90 MM HG: CPT | Mod: CPTII,S$GLB,, | Performed by: FAMILY MEDICINE

## 2021-08-20 PROCEDURE — 71046 X-RAY EXAM CHEST 2 VIEWS: CPT | Mod: FY,S$GLB,, | Performed by: RADIOLOGY

## 2021-08-20 PROCEDURE — 71046 XR CHEST PA AND LATERAL: ICD-10-PCS | Mod: FY,S$GLB,, | Performed by: RADIOLOGY

## 2021-08-20 PROCEDURE — 3077F PR MOST RECENT SYSTOLIC BLOOD PRESSURE >= 140 MM HG: ICD-10-PCS | Mod: CPTII,S$GLB,, | Performed by: FAMILY MEDICINE

## 2021-08-20 PROCEDURE — 1159F MED LIST DOCD IN RCRD: CPT | Mod: CPTII,S$GLB,, | Performed by: FAMILY MEDICINE

## 2021-08-20 PROCEDURE — U0002: ICD-10-PCS | Mod: QW,S$GLB,, | Performed by: FAMILY MEDICINE

## 2021-08-20 PROCEDURE — 3051F HG A1C>EQUAL 7.0%<8.0%: CPT | Mod: CPTII,S$GLB,, | Performed by: FAMILY MEDICINE

## 2021-08-20 PROCEDURE — 1160F RVW MEDS BY RX/DR IN RCRD: CPT | Mod: CPTII,S$GLB,, | Performed by: FAMILY MEDICINE

## 2021-08-20 PROCEDURE — 99215 OFFICE O/P EST HI 40 MIN: CPT | Mod: 25,S$GLB,, | Performed by: FAMILY MEDICINE

## 2021-08-20 PROCEDURE — 3080F PR MOST RECENT DIASTOLIC BLOOD PRESSURE >= 90 MM HG: ICD-10-PCS | Mod: CPTII,S$GLB,, | Performed by: FAMILY MEDICINE

## 2021-08-20 RX ORDER — IPRATROPIUM BROMIDE 0.5 MG/2.5ML
0.5 SOLUTION RESPIRATORY (INHALATION)
Status: COMPLETED | OUTPATIENT
Start: 2021-08-20 | End: 2021-08-20

## 2021-08-20 RX ORDER — DOXYCYCLINE 100 MG/1
100 CAPSULE ORAL 2 TIMES DAILY
Qty: 14 CAPSULE | Refills: 0 | Status: SHIPPED | OUTPATIENT
Start: 2021-08-20 | End: 2021-08-27

## 2021-08-20 RX ORDER — ALBUTEROL SULFATE 0.83 MG/ML
2.5 SOLUTION RESPIRATORY (INHALATION) EVERY 4 HOURS PRN
Qty: 1 BOX | Refills: 0 | Status: SHIPPED | OUTPATIENT
Start: 2021-08-20 | End: 2022-08-20

## 2021-08-20 RX ORDER — ALBUTEROL SULFATE 0.83 MG/ML
2.5 SOLUTION RESPIRATORY (INHALATION)
Status: COMPLETED | OUTPATIENT
Start: 2021-08-20 | End: 2021-08-20

## 2021-08-20 RX ADMIN — IPRATROPIUM BROMIDE 0.5 MG: 0.5 SOLUTION RESPIRATORY (INHALATION) at 05:08

## 2021-08-20 RX ADMIN — ALBUTEROL SULFATE 2.5 MG: 0.83 SOLUTION RESPIRATORY (INHALATION) at 05:08

## 2021-08-23 ENCOUNTER — PATIENT MESSAGE (OUTPATIENT)
Dept: RHEUMATOLOGY | Facility: CLINIC | Age: 66
End: 2021-08-23

## 2021-08-23 ENCOUNTER — PATIENT MESSAGE (OUTPATIENT)
Dept: CARDIOLOGY | Facility: CLINIC | Age: 66
End: 2021-08-23

## 2021-08-24 ENCOUNTER — CLINICAL SUPPORT (OUTPATIENT)
Dept: DIABETES | Facility: CLINIC | Age: 66
End: 2021-08-24
Payer: MEDICARE

## 2021-08-24 DIAGNOSIS — E11.65 TYPE 2 DIABETES MELLITUS WITH HYPERGLYCEMIA, WITH LONG-TERM CURRENT USE OF INSULIN: Primary | ICD-10-CM

## 2021-08-24 DIAGNOSIS — Z79.4 TYPE 2 DIABETES MELLITUS WITH HYPERGLYCEMIA, WITH LONG-TERM CURRENT USE OF INSULIN: Primary | ICD-10-CM

## 2021-08-24 PROCEDURE — G0108 PR DIAB MANAGE TRN  PER INDIV: ICD-10-PCS | Mod: 95,,, | Performed by: FAMILY MEDICINE

## 2021-08-24 PROCEDURE — G0108 DIAB MANAGE TRN  PER INDIV: HCPCS | Mod: 95,,, | Performed by: FAMILY MEDICINE

## 2021-08-26 ENCOUNTER — OFFICE VISIT (OUTPATIENT)
Dept: CARDIOLOGY | Facility: CLINIC | Age: 66
End: 2021-08-26
Payer: MEDICARE

## 2021-08-26 VITALS
HEART RATE: 88 BPM | BODY MASS INDEX: 34.09 KG/M2 | OXYGEN SATURATION: 94 % | DIASTOLIC BLOOD PRESSURE: 82 MMHG | WEIGHT: 251.31 LBS | SYSTOLIC BLOOD PRESSURE: 124 MMHG

## 2021-08-26 DIAGNOSIS — E66.01 SEVERE OBESITY: ICD-10-CM

## 2021-08-26 DIAGNOSIS — I25.10 ATHEROSCLEROSIS OF NATIVE CORONARY ARTERY OF NATIVE HEART WITHOUT ANGINA PECTORIS: Primary | ICD-10-CM

## 2021-08-26 DIAGNOSIS — I50.22 CHRONIC SYSTOLIC HEART FAILURE: ICD-10-CM

## 2021-08-26 DIAGNOSIS — J44.9 CHRONIC OBSTRUCTIVE PULMONARY DISEASE, UNSPECIFIED COPD TYPE: ICD-10-CM

## 2021-08-26 DIAGNOSIS — I10 ESSENTIAL HYPERTENSION: ICD-10-CM

## 2021-08-26 DIAGNOSIS — E11.59 TYPE 2 DIABETES MELLITUS WITH OTHER CIRCULATORY COMPLICATION, WITH LONG-TERM CURRENT USE OF INSULIN: ICD-10-CM

## 2021-08-26 DIAGNOSIS — Z79.4 TYPE 2 DIABETES MELLITUS WITH OTHER CIRCULATORY COMPLICATION, WITH LONG-TERM CURRENT USE OF INSULIN: ICD-10-CM

## 2021-08-26 PROCEDURE — 3051F HG A1C>EQUAL 7.0%<8.0%: CPT | Mod: CPTII,95,, | Performed by: INTERNAL MEDICINE

## 2021-08-26 PROCEDURE — 99213 OFFICE O/P EST LOW 20 MIN: CPT | Mod: 95,,, | Performed by: INTERNAL MEDICINE

## 2021-08-26 PROCEDURE — 1159F PR MEDICATION LIST DOCUMENTED IN MEDICAL RECORD: ICD-10-PCS | Mod: CPTII,95,, | Performed by: INTERNAL MEDICINE

## 2021-08-26 PROCEDURE — 3051F PR MOST RECENT HEMOGLOBIN A1C LEVEL 7.0 - < 8.0%: ICD-10-PCS | Mod: CPTII,95,, | Performed by: INTERNAL MEDICINE

## 2021-08-26 PROCEDURE — 1160F RVW MEDS BY RX/DR IN RCRD: CPT | Mod: CPTII,95,, | Performed by: INTERNAL MEDICINE

## 2021-08-26 PROCEDURE — 3079F PR MOST RECENT DIASTOLIC BLOOD PRESSURE 80-89 MM HG: ICD-10-PCS | Mod: CPTII,95,, | Performed by: INTERNAL MEDICINE

## 2021-08-26 PROCEDURE — 1160F PR REVIEW ALL MEDS BY PRESCRIBER/CLIN PHARMACIST DOCUMENTED: ICD-10-PCS | Mod: CPTII,95,, | Performed by: INTERNAL MEDICINE

## 2021-08-26 PROCEDURE — 1159F MED LIST DOCD IN RCRD: CPT | Mod: CPTII,95,, | Performed by: INTERNAL MEDICINE

## 2021-08-26 PROCEDURE — 3074F PR MOST RECENT SYSTOLIC BLOOD PRESSURE < 130 MM HG: ICD-10-PCS | Mod: CPTII,95,, | Performed by: INTERNAL MEDICINE

## 2021-08-26 PROCEDURE — 99213 PR OFFICE/OUTPT VISIT, EST, LEVL III, 20-29 MIN: ICD-10-PCS | Mod: 95,,, | Performed by: INTERNAL MEDICINE

## 2021-08-26 PROCEDURE — 3008F BODY MASS INDEX DOCD: CPT | Mod: CPTII,95,, | Performed by: INTERNAL MEDICINE

## 2021-08-26 PROCEDURE — 3079F DIAST BP 80-89 MM HG: CPT | Mod: CPTII,95,, | Performed by: INTERNAL MEDICINE

## 2021-08-26 PROCEDURE — 3008F PR BODY MASS INDEX (BMI) DOCUMENTED: ICD-10-PCS | Mod: CPTII,95,, | Performed by: INTERNAL MEDICINE

## 2021-08-26 PROCEDURE — 3074F SYST BP LT 130 MM HG: CPT | Mod: CPTII,95,, | Performed by: INTERNAL MEDICINE

## 2021-09-03 ENCOUNTER — PATIENT MESSAGE (OUTPATIENT)
Dept: CARDIOLOGY | Facility: CLINIC | Age: 66
End: 2021-09-03

## 2021-09-09 ENCOUNTER — PATIENT MESSAGE (OUTPATIENT)
Dept: DIABETES | Facility: CLINIC | Age: 66
End: 2021-09-09

## 2021-09-10 ENCOUNTER — PATIENT MESSAGE (OUTPATIENT)
Dept: RHEUMATOLOGY | Facility: CLINIC | Age: 66
End: 2021-09-10

## 2021-09-15 ENCOUNTER — CLINICAL SUPPORT (OUTPATIENT)
Dept: DIABETES | Facility: CLINIC | Age: 66
End: 2021-09-15
Payer: MEDICARE

## 2021-09-15 ENCOUNTER — PATIENT MESSAGE (OUTPATIENT)
Dept: RHEUMATOLOGY | Facility: CLINIC | Age: 66
End: 2021-09-15

## 2021-09-15 DIAGNOSIS — E11.65 TYPE 2 DIABETES MELLITUS WITH HYPERGLYCEMIA, WITH LONG-TERM CURRENT USE OF INSULIN: Primary | ICD-10-CM

## 2021-09-15 DIAGNOSIS — Z79.4 TYPE 2 DIABETES MELLITUS WITH HYPERGLYCEMIA, WITH LONG-TERM CURRENT USE OF INSULIN: Primary | ICD-10-CM

## 2021-09-15 PROCEDURE — G0108 PR DIAB MANAGE TRN  PER INDIV: ICD-10-PCS | Mod: 95,,, | Performed by: FAMILY MEDICINE

## 2021-09-15 PROCEDURE — G0108 DIAB MANAGE TRN  PER INDIV: HCPCS | Mod: 95,,, | Performed by: FAMILY MEDICINE

## 2021-09-16 ENCOUNTER — TELEPHONE (OUTPATIENT)
Dept: INFECTIOUS DISEASES | Facility: HOSPITAL | Age: 66
End: 2021-09-16

## 2021-09-20 ENCOUNTER — PATIENT MESSAGE (OUTPATIENT)
Dept: RHEUMATOLOGY | Facility: CLINIC | Age: 66
End: 2021-09-20

## 2021-09-20 RX ORDER — ABATACEPT 125 MG/ML
125 INJECTION, SOLUTION SUBCUTANEOUS
Qty: 4 ML | Refills: 11 | Status: SHIPPED | OUTPATIENT
Start: 2021-09-20 | End: 2022-02-04 | Stop reason: SDUPTHER

## 2021-09-28 ENCOUNTER — PATIENT MESSAGE (OUTPATIENT)
Dept: ENDOCRINOLOGY | Facility: CLINIC | Age: 66
End: 2021-09-28

## 2021-09-28 DIAGNOSIS — E11.65 TYPE 2 DIABETES MELLITUS WITH HYPERGLYCEMIA, WITH LONG-TERM CURRENT USE OF INSULIN: Primary | ICD-10-CM

## 2021-09-28 DIAGNOSIS — Z79.4 TYPE 2 DIABETES MELLITUS WITH HYPERGLYCEMIA, WITH LONG-TERM CURRENT USE OF INSULIN: Primary | ICD-10-CM

## 2021-09-28 RX ORDER — PEN NEEDLE, DIABETIC 30 GX3/16"
NEEDLE, DISPOSABLE MISCELLANEOUS
Qty: 400 EACH | Refills: 11 | Status: SHIPPED | OUTPATIENT
Start: 2021-09-28 | End: 2022-02-28 | Stop reason: SDUPTHER

## 2021-09-29 ENCOUNTER — SPECIALTY PHARMACY (OUTPATIENT)
Dept: PHARMACY | Facility: CLINIC | Age: 66
End: 2021-09-29
Payer: MEDICARE

## 2021-10-06 ENCOUNTER — PATIENT MESSAGE (OUTPATIENT)
Dept: RHEUMATOLOGY | Facility: CLINIC | Age: 66
End: 2021-10-06

## 2021-10-07 ENCOUNTER — PATIENT MESSAGE (OUTPATIENT)
Dept: INFECTIOUS DISEASES | Facility: CLINIC | Age: 66
End: 2021-10-07

## 2021-10-11 RX ORDER — PREDNISONE 10 MG/1
10 TABLET ORAL DAILY
Qty: 90 TABLET | Refills: 3 | Status: SHIPPED | OUTPATIENT
Start: 2021-10-11 | End: 2022-08-23 | Stop reason: SDUPTHER

## 2021-10-18 ENCOUNTER — OFFICE VISIT (OUTPATIENT)
Dept: RHEUMATOLOGY | Facility: CLINIC | Age: 66
End: 2021-10-18
Payer: MEDICARE

## 2021-10-18 DIAGNOSIS — M06.9 RHEUMATOID ARTHRITIS INVOLVING MULTIPLE JOINTS: Primary | ICD-10-CM

## 2021-10-18 DIAGNOSIS — M06.9 RHEUMATOID ARTHRITIS FLARE: ICD-10-CM

## 2021-10-18 DIAGNOSIS — Z79.899 ENCOUNTER FOR LONG-TERM CURRENT USE OF HIGH RISK MEDICATION: ICD-10-CM

## 2021-10-18 DIAGNOSIS — D45 POLYCYTHEMIA VERA: ICD-10-CM

## 2021-10-18 PROCEDURE — 99215 OFFICE O/P EST HI 40 MIN: CPT | Mod: 95,,, | Performed by: INTERNAL MEDICINE

## 2021-10-18 PROCEDURE — 4010F ACE/ARB THERAPY RXD/TAKEN: CPT | Mod: CPTII,95,, | Performed by: INTERNAL MEDICINE

## 2021-10-18 PROCEDURE — 4010F PR ACE/ARB THEARPY RXD/TAKEN: ICD-10-PCS | Mod: CPTII,95,, | Performed by: INTERNAL MEDICINE

## 2021-10-18 PROCEDURE — 99499 RISK ADDL DX/OHS AUDIT: ICD-10-PCS | Mod: 95,,, | Performed by: INTERNAL MEDICINE

## 2021-10-18 PROCEDURE — 3066F NEPHROPATHY DOC TX: CPT | Mod: CPTII,95,, | Performed by: INTERNAL MEDICINE

## 2021-10-18 PROCEDURE — 99499 UNLISTED E&M SERVICE: CPT | Mod: 95,,, | Performed by: INTERNAL MEDICINE

## 2021-10-18 PROCEDURE — 3061F PR NEG MICROALBUMINURIA RESULT DOCUMENTED/REVIEW: ICD-10-PCS | Mod: CPTII,95,, | Performed by: INTERNAL MEDICINE

## 2021-10-18 PROCEDURE — 1125F AMNT PAIN NOTED PAIN PRSNT: CPT | Mod: CPTII,95,, | Performed by: INTERNAL MEDICINE

## 2021-10-18 PROCEDURE — 1159F PR MEDICATION LIST DOCUMENTED IN MEDICAL RECORD: ICD-10-PCS | Mod: CPTII,95,, | Performed by: INTERNAL MEDICINE

## 2021-10-18 PROCEDURE — 1159F MED LIST DOCD IN RCRD: CPT | Mod: CPTII,95,, | Performed by: INTERNAL MEDICINE

## 2021-10-18 PROCEDURE — 3061F NEG MICROALBUMINURIA REV: CPT | Mod: CPTII,95,, | Performed by: INTERNAL MEDICINE

## 2021-10-18 PROCEDURE — 3051F HG A1C>EQUAL 7.0%<8.0%: CPT | Mod: CPTII,95,, | Performed by: INTERNAL MEDICINE

## 2021-10-18 PROCEDURE — 1125F PR PAIN SEVERITY QUANTIFIED, PAIN PRESENT: ICD-10-PCS | Mod: CPTII,95,, | Performed by: INTERNAL MEDICINE

## 2021-10-18 PROCEDURE — 3051F PR MOST RECENT HEMOGLOBIN A1C LEVEL 7.0 - < 8.0%: ICD-10-PCS | Mod: CPTII,95,, | Performed by: INTERNAL MEDICINE

## 2021-10-18 PROCEDURE — 99215 PR OFFICE/OUTPT VISIT, EST, LEVL V, 40-54 MIN: ICD-10-PCS | Mod: 95,,, | Performed by: INTERNAL MEDICINE

## 2021-10-18 PROCEDURE — 3066F PR DOCUMENTATION OF TREATMENT FOR NEPHROPATHY: ICD-10-PCS | Mod: CPTII,95,, | Performed by: INTERNAL MEDICINE

## 2021-10-20 ENCOUNTER — CLINICAL SUPPORT (OUTPATIENT)
Dept: INFECTIOUS DISEASES | Facility: CLINIC | Age: 66
End: 2021-10-20
Payer: MEDICARE

## 2021-10-20 ENCOUNTER — CLINICAL SUPPORT (OUTPATIENT)
Dept: DIABETES | Facility: CLINIC | Age: 66
End: 2021-10-20
Payer: MEDICARE

## 2021-10-20 ENCOUNTER — TELEPHONE (OUTPATIENT)
Dept: INFECTIOUS DISEASES | Facility: CLINIC | Age: 66
End: 2021-10-20

## 2021-10-20 ENCOUNTER — PATIENT MESSAGE (OUTPATIENT)
Dept: DIABETES | Facility: CLINIC | Age: 66
End: 2021-10-20

## 2021-10-20 ENCOUNTER — PATIENT MESSAGE (OUTPATIENT)
Dept: HEPATOLOGY | Facility: CLINIC | Age: 66
End: 2021-10-20

## 2021-10-20 DIAGNOSIS — Z00.00 PREVENTATIVE HEALTH CARE: Primary | ICD-10-CM

## 2021-10-20 PROCEDURE — G0009 PNEUMOCOCCAL POLYSACCHARIDE VACCINE 23-VALENT =>2YO SQ IM: ICD-10-PCS | Mod: S$GLB,,, | Performed by: INTERNAL MEDICINE

## 2021-10-20 PROCEDURE — 90732 PNEUMOCOCCAL POLYSACCHARIDE VACCINE 23-VALENT =>2YO SQ IM: ICD-10-PCS | Mod: S$GLB,,, | Performed by: INTERNAL MEDICINE

## 2021-10-20 PROCEDURE — G0108 PR DIAB MANAGE TRN  PER INDIV: ICD-10-PCS | Mod: 95,,,

## 2021-10-20 PROCEDURE — 90732 PPSV23 VACC 2 YRS+ SUBQ/IM: CPT | Mod: S$GLB,,, | Performed by: INTERNAL MEDICINE

## 2021-10-20 PROCEDURE — G0009 ADMIN PNEUMOCOCCAL VACCINE: HCPCS | Mod: S$GLB,,, | Performed by: INTERNAL MEDICINE

## 2021-10-20 PROCEDURE — G0108 DIAB MANAGE TRN  PER INDIV: HCPCS | Mod: 95,,,

## 2021-10-21 ENCOUNTER — PATIENT MESSAGE (OUTPATIENT)
Dept: RHEUMATOLOGY | Facility: CLINIC | Age: 66
End: 2021-10-21
Payer: MEDICARE

## 2021-10-22 ENCOUNTER — PATIENT MESSAGE (OUTPATIENT)
Dept: HEPATOLOGY | Facility: CLINIC | Age: 66
End: 2021-10-22

## 2021-10-22 ENCOUNTER — TELEPHONE (OUTPATIENT)
Dept: HEPATOLOGY | Facility: CLINIC | Age: 66
End: 2021-10-22

## 2021-10-22 DIAGNOSIS — Z20.828 NEED FOR POST EXPOSURE PROPHYLAXIS FOR HEPATITIS B: Primary | ICD-10-CM

## 2021-10-22 DIAGNOSIS — Z23 NEED FOR POST EXPOSURE PROPHYLAXIS FOR HEPATITIS B: Primary | ICD-10-CM

## 2021-10-22 RX ORDER — LAMIVUDINE 150 MG/1
300 TABLET, FILM COATED ORAL DAILY
Qty: 1 TABLET | Refills: 11 | Status: SHIPPED | OUTPATIENT
Start: 2021-10-22 | End: 2022-01-17

## 2021-10-25 ENCOUNTER — PATIENT MESSAGE (OUTPATIENT)
Dept: INFECTIOUS DISEASES | Facility: CLINIC | Age: 66
End: 2021-10-25
Payer: MEDICARE

## 2021-10-27 ENCOUNTER — PATIENT MESSAGE (OUTPATIENT)
Dept: HEPATOLOGY | Facility: CLINIC | Age: 66
End: 2021-10-27
Payer: MEDICARE

## 2021-10-28 ENCOUNTER — LAB VISIT (OUTPATIENT)
Dept: LAB | Facility: OTHER | Age: 66
End: 2021-10-28
Attending: INTERNAL MEDICINE
Payer: MEDICARE

## 2021-10-28 ENCOUNTER — PATIENT MESSAGE (OUTPATIENT)
Dept: DIABETES | Facility: CLINIC | Age: 66
End: 2021-10-28
Payer: MEDICARE

## 2021-10-28 DIAGNOSIS — E11.65 TYPE 2 DIABETES MELLITUS WITH HYPERGLYCEMIA, WITH LONG-TERM CURRENT USE OF INSULIN: ICD-10-CM

## 2021-10-28 DIAGNOSIS — Z79.4 TYPE 2 DIABETES MELLITUS WITH HYPERGLYCEMIA, WITH LONG-TERM CURRENT USE OF INSULIN: ICD-10-CM

## 2021-10-28 LAB
ANION GAP SERPL CALC-SCNC: 11 MMOL/L (ref 8–16)
BUN SERPL-MCNC: 14 MG/DL (ref 8–23)
C PEPTIDE SERPL-MCNC: 1.56 NG/ML (ref 0.78–5.19)
CALCIUM SERPL-MCNC: 9.1 MG/DL (ref 8.7–10.5)
CHLORIDE SERPL-SCNC: 103 MMOL/L (ref 95–110)
CO2 SERPL-SCNC: 25 MMOL/L (ref 23–29)
CREAT SERPL-MCNC: 1.1 MG/DL (ref 0.5–1.4)
EST. GFR  (AFRICAN AMERICAN): >60 ML/MIN/1.73 M^2
EST. GFR  (NON AFRICAN AMERICAN): >60 ML/MIN/1.73 M^2
ESTIMATED AVG GLUCOSE: 180 MG/DL (ref 68–131)
GLUCOSE SERPL-MCNC: 166 MG/DL (ref 70–110)
HBA1C MFR BLD: 7.9 % (ref 4–5.6)
POTASSIUM SERPL-SCNC: 4.7 MMOL/L (ref 3.5–5.1)
SODIUM SERPL-SCNC: 139 MMOL/L (ref 136–145)

## 2021-10-28 PROCEDURE — 84681 ASSAY OF C-PEPTIDE: CPT | Performed by: INTERNAL MEDICINE

## 2021-10-28 PROCEDURE — 80048 BASIC METABOLIC PNL TOTAL CA: CPT | Performed by: INTERNAL MEDICINE

## 2021-10-28 PROCEDURE — 83036 HEMOGLOBIN GLYCOSYLATED A1C: CPT | Performed by: INTERNAL MEDICINE

## 2021-11-03 ENCOUNTER — OFFICE VISIT (OUTPATIENT)
Dept: ENDOCRINOLOGY | Facility: CLINIC | Age: 66
End: 2021-11-03
Payer: MEDICARE

## 2021-11-03 ENCOUNTER — CLINICAL SUPPORT (OUTPATIENT)
Dept: DIABETES | Facility: CLINIC | Age: 66
End: 2021-11-03
Payer: MEDICARE

## 2021-11-03 VITALS — WEIGHT: 249.31 LBS | HEIGHT: 72 IN | BODY MASS INDEX: 33.77 KG/M2

## 2021-11-03 VITALS
SYSTOLIC BLOOD PRESSURE: 147 MMHG | DIASTOLIC BLOOD PRESSURE: 80 MMHG | BODY MASS INDEX: 33.77 KG/M2 | WEIGHT: 249.31 LBS | HEIGHT: 72 IN | HEART RATE: 95 BPM

## 2021-11-03 DIAGNOSIS — E11.65 TYPE 2 DIABETES MELLITUS WITH HYPERGLYCEMIA, WITH LONG-TERM CURRENT USE OF INSULIN: Primary | ICD-10-CM

## 2021-11-03 DIAGNOSIS — E66.09 CLASS 1 OBESITY DUE TO EXCESS CALORIES WITH SERIOUS COMORBIDITY AND BODY MASS INDEX (BMI) OF 33.0 TO 33.9 IN ADULT: ICD-10-CM

## 2021-11-03 DIAGNOSIS — Z79.4 TYPE 2 DIABETES MELLITUS WITH HYPERGLYCEMIA, WITH LONG-TERM CURRENT USE OF INSULIN: Primary | ICD-10-CM

## 2021-11-03 DIAGNOSIS — I10 ESSENTIAL HYPERTENSION: ICD-10-CM

## 2021-11-03 DIAGNOSIS — E78.2 MIXED HYPERLIPIDEMIA: ICD-10-CM

## 2021-11-03 PROCEDURE — 99999 PR PBB SHADOW E&M-EST. PATIENT-LVL III: ICD-10-PCS | Mod: PBBFAC,,,

## 2021-11-03 PROCEDURE — 95251 CONT GLUC MNTR ANALYSIS I&R: CPT | Mod: S$GLB,,, | Performed by: INTERNAL MEDICINE

## 2021-11-03 PROCEDURE — G0108 DIAB MANAGE TRN  PER INDIV: HCPCS | Mod: S$GLB,,, | Performed by: FAMILY MEDICINE

## 2021-11-03 PROCEDURE — G0108 PR DIAB MANAGE TRN  PER INDIV: ICD-10-PCS | Mod: S$GLB,,, | Performed by: FAMILY MEDICINE

## 2021-11-03 PROCEDURE — 95251 PR GLUCOSE MONITOR, 72 HOUR, PHYS INTERP: ICD-10-PCS | Mod: S$GLB,,, | Performed by: INTERNAL MEDICINE

## 2021-11-03 PROCEDURE — 99214 OFFICE O/P EST MOD 30 MIN: CPT | Mod: 25,S$GLB,, | Performed by: INTERNAL MEDICINE

## 2021-11-03 PROCEDURE — 99999 PR PBB SHADOW E&M-EST. PATIENT-LVL III: CPT | Mod: PBBFAC,,,

## 2021-11-03 PROCEDURE — 99214 PR OFFICE/OUTPT VISIT, EST, LEVL IV, 30-39 MIN: ICD-10-PCS | Mod: 25,S$GLB,, | Performed by: INTERNAL MEDICINE

## 2021-11-03 RX ORDER — LANCETS
EACH MISCELLANEOUS
Qty: 100 EACH | Refills: 11 | Status: SHIPPED | OUTPATIENT
Start: 2021-11-03 | End: 2022-04-07 | Stop reason: SDUPTHER

## 2021-11-03 RX ORDER — DULAGLUTIDE 4.5 MG/.5ML
4.5 INJECTION, SOLUTION SUBCUTANEOUS
Qty: 4 PEN | Refills: 11 | Status: SHIPPED | OUTPATIENT
Start: 2021-11-03 | End: 2022-03-02

## 2021-11-03 RX ORDER — EZETIMIBE 10 MG/1
10 TABLET ORAL DAILY
Qty: 30 TABLET | Refills: 11 | Status: SHIPPED | OUTPATIENT
Start: 2021-11-03 | End: 2022-10-28

## 2021-11-04 ENCOUNTER — LAB VISIT (OUTPATIENT)
Dept: LAB | Facility: HOSPITAL | Age: 66
End: 2021-11-04
Attending: INTERNAL MEDICINE
Payer: MEDICARE

## 2021-11-04 ENCOUNTER — OFFICE VISIT (OUTPATIENT)
Dept: HEMATOLOGY/ONCOLOGY | Facility: CLINIC | Age: 66
End: 2021-11-04
Payer: MEDICARE

## 2021-11-04 VITALS
HEART RATE: 89 BPM | HEIGHT: 72 IN | RESPIRATION RATE: 24 BRPM | SYSTOLIC BLOOD PRESSURE: 122 MMHG | WEIGHT: 251.44 LBS | OXYGEN SATURATION: 92 % | BODY MASS INDEX: 34.06 KG/M2 | DIASTOLIC BLOOD PRESSURE: 67 MMHG | TEMPERATURE: 99 F

## 2021-11-04 DIAGNOSIS — D75.1 POLYCYTHEMIA, SECONDARY: ICD-10-CM

## 2021-11-04 DIAGNOSIS — D45 POLYCYTHEMIA VERA: ICD-10-CM

## 2021-11-04 DIAGNOSIS — Z79.4 TYPE 2 DIABETES MELLITUS WITH HYPERGLYCEMIA, WITH LONG-TERM CURRENT USE OF INSULIN: ICD-10-CM

## 2021-11-04 DIAGNOSIS — E66.09 CLASS 1 OBESITY DUE TO EXCESS CALORIES WITH SERIOUS COMORBIDITY AND BODY MASS INDEX (BMI) OF 33.0 TO 33.9 IN ADULT: ICD-10-CM

## 2021-11-04 DIAGNOSIS — E11.65 TYPE 2 DIABETES MELLITUS WITH HYPERGLYCEMIA, WITH LONG-TERM CURRENT USE OF INSULIN: ICD-10-CM

## 2021-11-04 DIAGNOSIS — I50.22 CHRONIC SYSTOLIC HEART FAILURE: Primary | ICD-10-CM

## 2021-11-04 DIAGNOSIS — R76.8 HEPATITIS B CORE ANTIBODY POSITIVE: ICD-10-CM

## 2021-11-04 LAB
ALBUMIN SERPL BCP-MCNC: 3.3 G/DL (ref 3.5–5.2)
ALP SERPL-CCNC: 71 U/L (ref 55–135)
ALT SERPL W/O P-5'-P-CCNC: 32 U/L (ref 10–44)
ANION GAP SERPL CALC-SCNC: 11 MMOL/L (ref 8–16)
AST SERPL-CCNC: 18 U/L (ref 10–40)
BASOPHILS # BLD AUTO: 0.07 K/UL (ref 0–0.2)
BASOPHILS NFR BLD: 0.8 % (ref 0–1.9)
BILIRUB SERPL-MCNC: 0.5 MG/DL (ref 0.1–1)
BUN SERPL-MCNC: 21 MG/DL (ref 8–23)
CALCIUM SERPL-MCNC: 9.3 MG/DL (ref 8.7–10.5)
CHLORIDE SERPL-SCNC: 101 MMOL/L (ref 95–110)
CO2 SERPL-SCNC: 27 MMOL/L (ref 23–29)
CREAT SERPL-MCNC: 1.1 MG/DL (ref 0.5–1.4)
DIFFERENTIAL METHOD: ABNORMAL
EOSINOPHIL # BLD AUTO: 0.1 K/UL (ref 0–0.5)
EOSINOPHIL NFR BLD: 0.8 % (ref 0–8)
ERYTHROCYTE [DISTWIDTH] IN BLOOD BY AUTOMATED COUNT: 13.5 % (ref 11.5–14.5)
EST. GFR  (AFRICAN AMERICAN): >60 ML/MIN/1.73 M^2
EST. GFR  (NON AFRICAN AMERICAN): >60 ML/MIN/1.73 M^2
GLUCOSE SERPL-MCNC: 154 MG/DL (ref 70–110)
HCT VFR BLD AUTO: 53.1 % (ref 40–54)
HGB BLD-MCNC: 17.5 G/DL (ref 14–18)
IMM GRANULOCYTES # BLD AUTO: 0.05 K/UL (ref 0–0.04)
IMM GRANULOCYTES NFR BLD AUTO: 0.6 % (ref 0–0.5)
LDH SERPL L TO P-CCNC: 260 U/L (ref 110–260)
LYMPHOCYTES # BLD AUTO: 1.8 K/UL (ref 1–4.8)
LYMPHOCYTES NFR BLD: 20.3 % (ref 18–48)
MCH RBC QN AUTO: 32.2 PG (ref 27–31)
MCHC RBC AUTO-ENTMCNC: 33 G/DL (ref 32–36)
MCV RBC AUTO: 98 FL (ref 82–98)
MONOCYTES # BLD AUTO: 0.5 K/UL (ref 0.3–1)
MONOCYTES NFR BLD: 6.1 % (ref 4–15)
NEUTROPHILS # BLD AUTO: 6.3 K/UL (ref 1.8–7.7)
NEUTROPHILS NFR BLD: 71.4 % (ref 38–73)
NRBC BLD-RTO: 0 /100 WBC
PLATELET # BLD AUTO: 198 K/UL (ref 150–450)
PMV BLD AUTO: 11.2 FL (ref 9.2–12.9)
POTASSIUM SERPL-SCNC: 4.9 MMOL/L (ref 3.5–5.1)
PROT SERPL-MCNC: 6.7 G/DL (ref 6–8.4)
RBC # BLD AUTO: 5.44 M/UL (ref 4.6–6.2)
SODIUM SERPL-SCNC: 139 MMOL/L (ref 136–145)
TESTOST SERPL-MCNC: 442 NG/DL (ref 304–1227)
URATE SERPL-MCNC: 5.2 MG/DL (ref 3.4–7)
WBC # BLD AUTO: 8.85 K/UL (ref 3.9–12.7)

## 2021-11-04 PROCEDURE — 1101F PT FALLS ASSESS-DOCD LE1/YR: CPT | Mod: CPTII,S$GLB,, | Performed by: INTERNAL MEDICINE

## 2021-11-04 PROCEDURE — 3288F PR FALLS RISK ASSESSMENT DOCUMENTED: ICD-10-PCS | Mod: CPTII,S$GLB,, | Performed by: INTERNAL MEDICINE

## 2021-11-04 PROCEDURE — 84550 ASSAY OF BLOOD/URIC ACID: CPT | Performed by: INTERNAL MEDICINE

## 2021-11-04 PROCEDURE — 4010F PR ACE/ARB THEARPY RXD/TAKEN: ICD-10-PCS | Mod: CPTII,S$GLB,, | Performed by: INTERNAL MEDICINE

## 2021-11-04 PROCEDURE — 3074F PR MOST RECENT SYSTOLIC BLOOD PRESSURE < 130 MM HG: ICD-10-PCS | Mod: CPTII,S$GLB,, | Performed by: INTERNAL MEDICINE

## 2021-11-04 PROCEDURE — 1159F PR MEDICATION LIST DOCUMENTED IN MEDICAL RECORD: ICD-10-PCS | Mod: CPTII,S$GLB,, | Performed by: INTERNAL MEDICINE

## 2021-11-04 PROCEDURE — 3051F HG A1C>EQUAL 7.0%<8.0%: CPT | Mod: CPTII,S$GLB,, | Performed by: INTERNAL MEDICINE

## 2021-11-04 PROCEDURE — 1126F PR PAIN SEVERITY QUANTIFIED, NO PAIN PRESENT: ICD-10-PCS | Mod: CPTII,S$GLB,, | Performed by: INTERNAL MEDICINE

## 2021-11-04 PROCEDURE — 3051F PR MOST RECENT HEMOGLOBIN A1C LEVEL 7.0 - < 8.0%: ICD-10-PCS | Mod: CPTII,S$GLB,, | Performed by: INTERNAL MEDICINE

## 2021-11-04 PROCEDURE — 99999 PR PBB SHADOW E&M-EST. PATIENT-LVL V: ICD-10-PCS | Mod: PBBFAC,,, | Performed by: INTERNAL MEDICINE

## 2021-11-04 PROCEDURE — 83615 LACTATE (LD) (LDH) ENZYME: CPT | Performed by: INTERNAL MEDICINE

## 2021-11-04 PROCEDURE — 84403 ASSAY OF TOTAL TESTOSTERONE: CPT | Performed by: INTERNAL MEDICINE

## 2021-11-04 PROCEDURE — 85025 COMPLETE CBC W/AUTO DIFF WBC: CPT | Performed by: INTERNAL MEDICINE

## 2021-11-04 PROCEDURE — 3066F PR DOCUMENTATION OF TREATMENT FOR NEPHROPATHY: ICD-10-PCS | Mod: CPTII,S$GLB,, | Performed by: INTERNAL MEDICINE

## 2021-11-04 PROCEDURE — 82668 ASSAY OF ERYTHROPOIETIN: CPT | Performed by: INTERNAL MEDICINE

## 2021-11-04 PROCEDURE — 80053 COMPREHEN METABOLIC PANEL: CPT | Performed by: INTERNAL MEDICINE

## 2021-11-04 PROCEDURE — 3008F PR BODY MASS INDEX (BMI) DOCUMENTED: ICD-10-PCS | Mod: CPTII,S$GLB,, | Performed by: INTERNAL MEDICINE

## 2021-11-04 PROCEDURE — 99999 PR PBB SHADOW E&M-EST. PATIENT-LVL V: CPT | Mod: PBBFAC,,, | Performed by: INTERNAL MEDICINE

## 2021-11-04 PROCEDURE — 3288F FALL RISK ASSESSMENT DOCD: CPT | Mod: CPTII,S$GLB,, | Performed by: INTERNAL MEDICINE

## 2021-11-04 PROCEDURE — 3061F PR NEG MICROALBUMINURIA RESULT DOCUMENTED/REVIEW: ICD-10-PCS | Mod: CPTII,S$GLB,, | Performed by: INTERNAL MEDICINE

## 2021-11-04 PROCEDURE — 3008F BODY MASS INDEX DOCD: CPT | Mod: CPTII,S$GLB,, | Performed by: INTERNAL MEDICINE

## 2021-11-04 PROCEDURE — 1126F AMNT PAIN NOTED NONE PRSNT: CPT | Mod: CPTII,S$GLB,, | Performed by: INTERNAL MEDICINE

## 2021-11-04 PROCEDURE — 99205 PR OFFICE/OUTPT VISIT, NEW, LEVL V, 60-74 MIN: ICD-10-PCS | Mod: S$GLB,,, | Performed by: INTERNAL MEDICINE

## 2021-11-04 PROCEDURE — 1101F PR PT FALLS ASSESS DOC 0-1 FALLS W/OUT INJ PAST YR: ICD-10-PCS | Mod: CPTII,S$GLB,, | Performed by: INTERNAL MEDICINE

## 2021-11-04 PROCEDURE — 3078F DIAST BP <80 MM HG: CPT | Mod: CPTII,S$GLB,, | Performed by: INTERNAL MEDICINE

## 2021-11-04 PROCEDURE — 3061F NEG MICROALBUMINURIA REV: CPT | Mod: CPTII,S$GLB,, | Performed by: INTERNAL MEDICINE

## 2021-11-04 PROCEDURE — 3074F SYST BP LT 130 MM HG: CPT | Mod: CPTII,S$GLB,, | Performed by: INTERNAL MEDICINE

## 2021-11-04 PROCEDURE — 36415 COLL VENOUS BLD VENIPUNCTURE: CPT | Performed by: INTERNAL MEDICINE

## 2021-11-04 PROCEDURE — 3078F PR MOST RECENT DIASTOLIC BLOOD PRESSURE < 80 MM HG: ICD-10-PCS | Mod: CPTII,S$GLB,, | Performed by: INTERNAL MEDICINE

## 2021-11-04 PROCEDURE — 4010F ACE/ARB THERAPY RXD/TAKEN: CPT | Mod: CPTII,S$GLB,, | Performed by: INTERNAL MEDICINE

## 2021-11-04 PROCEDURE — 3066F NEPHROPATHY DOC TX: CPT | Mod: CPTII,S$GLB,, | Performed by: INTERNAL MEDICINE

## 2021-11-04 PROCEDURE — 82375 ASSAY CARBOXYHB QUANT: CPT | Performed by: INTERNAL MEDICINE

## 2021-11-04 PROCEDURE — 99205 OFFICE O/P NEW HI 60 MIN: CPT | Mod: S$GLB,,, | Performed by: INTERNAL MEDICINE

## 2021-11-04 PROCEDURE — 1159F MED LIST DOCD IN RCRD: CPT | Mod: CPTII,S$GLB,, | Performed by: INTERNAL MEDICINE

## 2021-11-05 LAB — COHGB MFR BLD: 11 %

## 2021-11-08 LAB — EPO SERPL-ACNC: 6 MIU/ML (ref 2.6–18.5)

## 2021-11-09 DIAGNOSIS — E11.69 TYPE 2 DIABETES MELLITUS WITH OTHER SPECIFIED COMPLICATION, UNSPECIFIED WHETHER LONG TERM INSULIN USE: Primary | ICD-10-CM

## 2021-11-17 ENCOUNTER — PATIENT MESSAGE (OUTPATIENT)
Dept: CARDIOLOGY | Facility: CLINIC | Age: 66
End: 2021-11-17
Payer: MEDICARE

## 2021-11-18 ENCOUNTER — PATIENT MESSAGE (OUTPATIENT)
Dept: CARDIOLOGY | Facility: CLINIC | Age: 66
End: 2021-11-18
Payer: MEDICARE

## 2021-12-04 ENCOUNTER — OFFICE VISIT (OUTPATIENT)
Dept: URGENT CARE | Facility: CLINIC | Age: 66
End: 2021-12-04
Payer: MEDICARE

## 2021-12-04 VITALS
HEIGHT: 72 IN | DIASTOLIC BLOOD PRESSURE: 92 MMHG | BODY MASS INDEX: 34 KG/M2 | TEMPERATURE: 99 F | HEART RATE: 86 BPM | WEIGHT: 251 LBS | RESPIRATION RATE: 18 BRPM | SYSTOLIC BLOOD PRESSURE: 154 MMHG | OXYGEN SATURATION: 97 %

## 2021-12-04 DIAGNOSIS — S51.811A SKIN TEAR OF RIGHT FOREARM WITHOUT COMPLICATION, INITIAL ENCOUNTER: Primary | ICD-10-CM

## 2021-12-04 DIAGNOSIS — Z71.6 TOBACCO ABUSE COUNSELING: ICD-10-CM

## 2021-12-04 DIAGNOSIS — Z23 NEED FOR TDAP VACCINATION: ICD-10-CM

## 2021-12-04 PROCEDURE — 12031 INTMD RPR S/A/T/EXT 2.5 CM/<: CPT | Mod: S$GLB,,, | Performed by: PHYSICIAN ASSISTANT

## 2021-12-04 PROCEDURE — 90715 TDAP VACCINE 7 YRS/> IM: CPT | Mod: AT,S$GLB,, | Performed by: PHYSICIAN ASSISTANT

## 2021-12-04 PROCEDURE — 90471 IMMUNIZATION ADMIN: CPT | Mod: AT,S$GLB,, | Performed by: PHYSICIAN ASSISTANT

## 2021-12-04 PROCEDURE — 99214 PR OFFICE/OUTPT VISIT, EST, LEVL IV, 30-39 MIN: ICD-10-PCS | Mod: 25,S$GLB,, | Performed by: PHYSICIAN ASSISTANT

## 2021-12-04 PROCEDURE — 90471 TDAP VACCINE GREATER THAN OR EQUAL TO 7YO IM: ICD-10-PCS | Mod: AT,S$GLB,, | Performed by: PHYSICIAN ASSISTANT

## 2021-12-04 PROCEDURE — 12031 LACERATION REPAIR: ICD-10-PCS | Mod: S$GLB,,, | Performed by: PHYSICIAN ASSISTANT

## 2021-12-04 PROCEDURE — 99214 OFFICE O/P EST MOD 30 MIN: CPT | Mod: 25,S$GLB,, | Performed by: PHYSICIAN ASSISTANT

## 2021-12-04 PROCEDURE — 90715 TDAP VACCINE GREATER THAN OR EQUAL TO 7YO IM: ICD-10-PCS | Mod: AT,S$GLB,, | Performed by: PHYSICIAN ASSISTANT

## 2021-12-09 ENCOUNTER — PATIENT MESSAGE (OUTPATIENT)
Dept: DIABETES | Facility: CLINIC | Age: 66
End: 2021-12-09
Payer: MEDICARE

## 2021-12-09 DIAGNOSIS — E11.69 TYPE 2 DIABETES MELLITUS WITH OTHER SPECIFIED COMPLICATION, WITH LONG-TERM CURRENT USE OF INSULIN: Primary | ICD-10-CM

## 2021-12-09 DIAGNOSIS — Z79.4 TYPE 2 DIABETES MELLITUS WITH OTHER SPECIFIED COMPLICATION, WITH LONG-TERM CURRENT USE OF INSULIN: Primary | ICD-10-CM

## 2021-12-13 ENCOUNTER — PATIENT MESSAGE (OUTPATIENT)
Dept: CARDIOLOGY | Facility: CLINIC | Age: 66
End: 2021-12-13
Payer: MEDICARE

## 2021-12-13 DIAGNOSIS — I50.22 CHRONIC SYSTOLIC HEART FAILURE: Primary | ICD-10-CM

## 2021-12-16 ENCOUNTER — CLINICAL SUPPORT (OUTPATIENT)
Dept: DIABETES | Facility: CLINIC | Age: 66
End: 2021-12-16
Payer: MEDICARE

## 2021-12-16 DIAGNOSIS — Z79.4 TYPE 2 DIABETES MELLITUS WITH OTHER SPECIFIED COMPLICATION, WITH LONG-TERM CURRENT USE OF INSULIN: ICD-10-CM

## 2021-12-16 DIAGNOSIS — E11.69 TYPE 2 DIABETES MELLITUS WITH OTHER SPECIFIED COMPLICATION, WITH LONG-TERM CURRENT USE OF INSULIN: ICD-10-CM

## 2021-12-16 PROCEDURE — G0108 DIAB MANAGE TRN  PER INDIV: HCPCS | Mod: 95,,, | Performed by: FAMILY MEDICINE

## 2021-12-16 PROCEDURE — G0108 PR DIAB MANAGE TRN  PER INDIV: ICD-10-PCS | Mod: 95,,, | Performed by: FAMILY MEDICINE

## 2021-12-17 ENCOUNTER — PATIENT MESSAGE (OUTPATIENT)
Dept: DIABETES | Facility: CLINIC | Age: 66
End: 2021-12-17
Payer: MEDICARE

## 2021-12-20 ENCOUNTER — TELEPHONE (OUTPATIENT)
Dept: RESEARCH | Facility: HOSPITAL | Age: 66
End: 2021-12-20
Payer: MEDICARE

## 2021-12-22 ENCOUNTER — RESEARCH ENCOUNTER (OUTPATIENT)
Dept: RESEARCH | Facility: HOSPITAL | Age: 66
End: 2021-12-22

## 2021-12-22 ENCOUNTER — OFFICE VISIT (OUTPATIENT)
Dept: HEPATOLOGY | Facility: CLINIC | Age: 66
End: 2021-12-22
Payer: MEDICARE

## 2021-12-22 ENCOUNTER — HOSPITAL ENCOUNTER (OUTPATIENT)
Dept: CARDIOLOGY | Facility: OTHER | Age: 66
Discharge: HOME | End: 2021-12-22
Attending: INTERNAL MEDICINE
Payer: MEDICARE

## 2021-12-22 ENCOUNTER — TELEPHONE (OUTPATIENT)
Dept: HEPATOLOGY | Facility: CLINIC | Age: 66
End: 2021-12-22

## 2021-12-22 VITALS
WEIGHT: 251 LBS | SYSTOLIC BLOOD PRESSURE: 154 MMHG | BODY MASS INDEX: 34 KG/M2 | HEIGHT: 72 IN | DIASTOLIC BLOOD PRESSURE: 92 MMHG

## 2021-12-22 DIAGNOSIS — R76.8 HEPATITIS B CORE ANTIBODY POSITIVE: Primary | ICD-10-CM

## 2021-12-22 DIAGNOSIS — I50.22 CHRONIC SYSTOLIC HEART FAILURE: ICD-10-CM

## 2021-12-22 DIAGNOSIS — K76.0 NAFLD (NONALCOHOLIC FATTY LIVER DISEASE): ICD-10-CM

## 2021-12-22 LAB
ASCENDING AORTA: 3.17 CM
AV INDEX (PROSTH): 0.78
AV MEAN GRADIENT: 3 MMHG
AV PEAK GRADIENT: 4 MMHG
AV VALVE AREA: 3.66 CM2
AV VELOCITY RATIO: 0.84
BSA FOR ECHO PROCEDURE: 2.4 M2
CV ECHO LV RWT: 0.41 CM
DOP CALC AO PEAK VEL: 1.06 M/S
DOP CALC AO VTI: 18.06 CM
DOP CALC LVOT AREA: 4.7 CM2
DOP CALC LVOT DIAMETER: 2.45 CM
DOP CALC LVOT PEAK VEL: 0.89 M/S
DOP CALC LVOT STROKE VOLUME: 66.16 CM3
DOP CALCLVOT PEAK VEL VTI: 14.04 CM
E WAVE DECELERATION TIME: 152.75 MSEC
E/A RATIO: 0.81
E/E' RATIO: 11.67 M/S
ECHO LV POSTERIOR WALL: 0.98 CM (ref 0.6–1.1)
EJECTION FRACTION: 57 %
FRACTIONAL SHORTENING: 30 % (ref 28–44)
INTERVENTRICULAR SEPTUM: 0.98 CM (ref 0.6–1.1)
IVRT: 103.81 MSEC
LA MAJOR: 5.13 CM
LA MINOR: 5.56 CM
LA WIDTH: 3.36 CM
LEFT ATRIUM SIZE: 4.48 CM
LEFT ATRIUM VOLUME INDEX MOD: 11.9 ML/M2
LEFT ATRIUM VOLUME INDEX: 29.1 ML/M2
LEFT ATRIUM VOLUME MOD: 28 CM3
LEFT ATRIUM VOLUME: 68.28 CM3
LEFT INTERNAL DIMENSION IN SYSTOLE: 3.34 CM (ref 2.1–4)
LEFT VENTRICLE DIASTOLIC VOLUME INDEX: 44.94 ML/M2
LEFT VENTRICLE DIASTOLIC VOLUME: 105.61 ML
LEFT VENTRICLE MASS INDEX: 70 G/M2
LEFT VENTRICLE SYSTOLIC VOLUME INDEX: 19.3 ML/M2
LEFT VENTRICLE SYSTOLIC VOLUME: 45.38 ML
LEFT VENTRICULAR INTERNAL DIMENSION IN DIASTOLE: 4.76 CM (ref 3.5–6)
LEFT VENTRICULAR MASS: 163.35 G
LV LATERAL E/E' RATIO: 10 M/S
LV SEPTAL E/E' RATIO: 14 M/S
MV A" WAVE DURATION": 10.38 MSEC
MV PEAK A VEL: 0.86 M/S
MV PEAK E VEL: 0.7 M/S
MV STENOSIS PRESSURE HALF TIME: 44.3 MS
MV VALVE AREA P 1/2 METHOD: 4.97 CM2
PISA TR MAX VEL: 1.05 M/S
PULM VEIN S/D RATIO: 1.3
PV PEAK D VEL: 0.46 M/S
PV PEAK S VEL: 0.6 M/S
PV PEAK VELOCITY: 1.16 CM/S
RA MAJOR: 4.44 CM
RA PRESSURE: 3 MMHG
RIGHT VENTRICULAR END-DIASTOLIC DIMENSION: 3.13 CM
RV TISSUE DOPPLER FREE WALL SYSTOLIC VELOCITY 1 (APICAL 4 CHAMBER VIEW): 14.26 CM/S
SINUS: 3.42 CM
STJ: 2.78 CM
TDI LATERAL: 0.07 M/S
TDI SEPTAL: 0.05 M/S
TDI: 0.06 M/S
TR MAX PG: 4 MMHG
TRICUSPID ANNULAR PLANE SYSTOLIC EXCURSION: 1.73 CM
TV REST PULMONARY ARTERY PRESSURE: 7 MMHG

## 2021-12-22 PROCEDURE — 93306 TTE W/DOPPLER COMPLETE: CPT | Mod: 26,,, | Performed by: INTERNAL MEDICINE

## 2021-12-22 PROCEDURE — 93306 TTE W/DOPPLER COMPLETE: CPT

## 2021-12-22 PROCEDURE — 93306 ECHO (CUPID ONLY): ICD-10-PCS | Mod: 26,,, | Performed by: INTERNAL MEDICINE

## 2021-12-22 PROCEDURE — 3066F NEPHROPATHY DOC TX: CPT | Mod: CPTII,95,, | Performed by: INTERNAL MEDICINE

## 2021-12-22 PROCEDURE — 3051F HG A1C>EQUAL 7.0%<8.0%: CPT | Mod: CPTII,95,, | Performed by: INTERNAL MEDICINE

## 2021-12-22 PROCEDURE — 3051F PR MOST RECENT HEMOGLOBIN A1C LEVEL 7.0 - < 8.0%: ICD-10-PCS | Mod: CPTII,95,, | Performed by: INTERNAL MEDICINE

## 2021-12-22 PROCEDURE — 4010F PR ACE/ARB THEARPY RXD/TAKEN: ICD-10-PCS | Mod: CPTII,95,, | Performed by: INTERNAL MEDICINE

## 2021-12-22 PROCEDURE — 99213 PR OFFICE/OUTPT VISIT, EST, LEVL III, 20-29 MIN: ICD-10-PCS | Mod: 95,,, | Performed by: INTERNAL MEDICINE

## 2021-12-22 PROCEDURE — 3066F PR DOCUMENTATION OF TREATMENT FOR NEPHROPATHY: ICD-10-PCS | Mod: CPTII,95,, | Performed by: INTERNAL MEDICINE

## 2021-12-22 PROCEDURE — 4010F ACE/ARB THERAPY RXD/TAKEN: CPT | Mod: CPTII,95,, | Performed by: INTERNAL MEDICINE

## 2021-12-22 PROCEDURE — 99213 OFFICE O/P EST LOW 20 MIN: CPT | Mod: 95,,, | Performed by: INTERNAL MEDICINE

## 2021-12-22 PROCEDURE — 3061F PR NEG MICROALBUMINURIA RESULT DOCUMENTED/REVIEW: ICD-10-PCS | Mod: CPTII,95,, | Performed by: INTERNAL MEDICINE

## 2021-12-22 PROCEDURE — 3061F NEG MICROALBUMINURIA REV: CPT | Mod: CPTII,95,, | Performed by: INTERNAL MEDICINE

## 2021-12-23 ENCOUNTER — PATIENT MESSAGE (OUTPATIENT)
Dept: CARDIOLOGY | Facility: CLINIC | Age: 66
End: 2021-12-23
Payer: MEDICARE

## 2021-12-23 DIAGNOSIS — I50.22 CHRONIC SYSTOLIC HEART FAILURE: ICD-10-CM

## 2021-12-23 RX ORDER — FUROSEMIDE 40 MG/1
40 TABLET ORAL DAILY
Qty: 90 TABLET | Refills: 3 | Status: CANCELLED | OUTPATIENT
Start: 2021-12-23

## 2021-12-27 RX ORDER — FUROSEMIDE 40 MG/1
40 TABLET ORAL DAILY
Qty: 90 TABLET | Refills: 3 | Status: SHIPPED | OUTPATIENT
Start: 2021-12-27 | End: 2022-10-17 | Stop reason: SDUPTHER

## 2021-12-27 RX ORDER — OLMESARTAN MEDOXOMIL 40 MG/1
40 TABLET ORAL NIGHTLY
Qty: 90 TABLET | Refills: 3 | Status: ON HOLD | OUTPATIENT
Start: 2021-12-27 | End: 2023-01-18 | Stop reason: HOSPADM

## 2022-01-05 ENCOUNTER — PATIENT MESSAGE (OUTPATIENT)
Dept: RHEUMATOLOGY | Facility: CLINIC | Age: 67
End: 2022-01-05
Payer: MEDICARE

## 2022-01-24 ENCOUNTER — HOSPITAL ENCOUNTER (OUTPATIENT)
Dept: RADIOLOGY | Facility: OTHER | Age: 67
Discharge: HOME | End: 2022-01-24
Attending: INTERNAL MEDICINE
Payer: MEDICARE

## 2022-01-24 DIAGNOSIS — R10.9 AP (ABDOMINAL PAIN): ICD-10-CM

## 2022-01-24 PROCEDURE — A9698 NON-RAD CONTRAST MATERIALNOC: HCPCS | Performed by: INTERNAL MEDICINE

## 2022-01-24 PROCEDURE — 74160 CT ABDOMEN WITH CONTRAST: ICD-10-PCS | Mod: 26,,, | Performed by: STUDENT IN AN ORGANIZED HEALTH CARE EDUCATION/TRAINING PROGRAM

## 2022-01-24 PROCEDURE — 74160 CT ABDOMEN W/CONTRAST: CPT | Mod: 26,,, | Performed by: STUDENT IN AN ORGANIZED HEALTH CARE EDUCATION/TRAINING PROGRAM

## 2022-01-24 PROCEDURE — 25500020 PHARM REV CODE 255: Performed by: INTERNAL MEDICINE

## 2022-01-24 PROCEDURE — 74160 CT ABDOMEN W/CONTRAST: CPT | Mod: TC

## 2022-01-24 RX ADMIN — IOHEXOL 500 ML: 12 SOLUTION ORAL at 01:01

## 2022-01-24 RX ADMIN — IOHEXOL 100 ML: 350 INJECTION, SOLUTION INTRAVENOUS at 02:01

## 2022-01-27 ENCOUNTER — PATIENT MESSAGE (OUTPATIENT)
Dept: DIABETES | Facility: CLINIC | Age: 67
End: 2022-01-27
Payer: MEDICARE

## 2022-01-27 ENCOUNTER — PATIENT MESSAGE (OUTPATIENT)
Dept: RHEUMATOLOGY | Facility: CLINIC | Age: 67
End: 2022-01-27
Payer: MEDICARE

## 2022-02-04 DIAGNOSIS — M06.9 RHEUMATOID ARTHRITIS INVOLVING MULTIPLE JOINTS: Primary | ICD-10-CM

## 2022-02-04 RX ORDER — ABATACEPT 125 MG/ML
125 INJECTION, SOLUTION SUBCUTANEOUS
Qty: 4 ML | Refills: 11 | OUTPATIENT
Start: 2022-02-04 | End: 2022-08-20

## 2022-02-08 ENCOUNTER — CLINICAL SUPPORT (OUTPATIENT)
Dept: DIABETES | Facility: CLINIC | Age: 67
End: 2022-02-08
Payer: MEDICARE

## 2022-02-08 DIAGNOSIS — E11.65 TYPE 2 DIABETES MELLITUS WITH HYPERGLYCEMIA, WITH LONG-TERM CURRENT USE OF INSULIN: Primary | ICD-10-CM

## 2022-02-08 DIAGNOSIS — Z79.4 TYPE 2 DIABETES MELLITUS WITH HYPERGLYCEMIA, WITH LONG-TERM CURRENT USE OF INSULIN: Primary | ICD-10-CM

## 2022-02-08 PROCEDURE — G0108 DIAB MANAGE TRN  PER INDIV: HCPCS | Mod: 95,,, | Performed by: FAMILY MEDICINE

## 2022-02-08 PROCEDURE — G0108 PR DIAB MANAGE TRN  PER INDIV: ICD-10-PCS | Mod: 95,,, | Performed by: FAMILY MEDICINE
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
## 2022-02-23 ENCOUNTER — PATIENT MESSAGE (OUTPATIENT)
Dept: RHEUMATOLOGY | Facility: CLINIC | Age: 67
End: 2022-02-23
Payer: MEDICARE

## 2022-02-23 DIAGNOSIS — D84.9 IMMUNOSUPPRESSED STATUS: ICD-10-CM

## 2022-02-28 ENCOUNTER — PATIENT MESSAGE (OUTPATIENT)
Dept: HEPATOLOGY | Facility: CLINIC | Age: 67
End: 2022-02-28
Payer: MEDICARE

## 2022-02-28 ENCOUNTER — OFFICE VISIT (OUTPATIENT)
Dept: RHEUMATOLOGY | Facility: CLINIC | Age: 67
End: 2022-02-28
Payer: MEDICARE

## 2022-02-28 DIAGNOSIS — M06.9 RHEUMATOID ARTHRITIS FLARE: Primary | ICD-10-CM

## 2022-02-28 DIAGNOSIS — Z79.4 TYPE 2 DIABETES MELLITUS WITH HYPERGLYCEMIA, WITH LONG-TERM CURRENT USE OF INSULIN: ICD-10-CM

## 2022-02-28 DIAGNOSIS — Z23 NEED FOR POST EXPOSURE PROPHYLAXIS FOR HEPATITIS B: ICD-10-CM

## 2022-02-28 DIAGNOSIS — E11.65 TYPE 2 DIABETES MELLITUS WITH HYPERGLYCEMIA, WITH LONG-TERM CURRENT USE OF INSULIN: ICD-10-CM

## 2022-02-28 DIAGNOSIS — Z20.828 NEED FOR POST EXPOSURE PROPHYLAXIS FOR HEPATITIS B: ICD-10-CM

## 2022-02-28 PROCEDURE — 99215 OFFICE O/P EST HI 40 MIN: CPT | Mod: 95,,, | Performed by: INTERNAL MEDICINE

## 2022-02-28 PROCEDURE — 1159F PR MEDICATION LIST DOCUMENTED IN MEDICAL RECORD: ICD-10-PCS | Mod: CPTII,95,, | Performed by: INTERNAL MEDICINE

## 2022-02-28 PROCEDURE — 1125F PR PAIN SEVERITY QUANTIFIED, PAIN PRESENT: ICD-10-PCS | Mod: CPTII,95,, | Performed by: INTERNAL MEDICINE

## 2022-02-28 PROCEDURE — 3051F PR MOST RECENT HEMOGLOBIN A1C LEVEL 7.0 - < 8.0%: ICD-10-PCS | Mod: CPTII,95,, | Performed by: INTERNAL MEDICINE

## 2022-02-28 PROCEDURE — 4010F ACE/ARB THERAPY RXD/TAKEN: CPT | Mod: CPTII,95,, | Performed by: INTERNAL MEDICINE

## 2022-02-28 PROCEDURE — 1159F MED LIST DOCD IN RCRD: CPT | Mod: CPTII,95,, | Performed by: INTERNAL MEDICINE

## 2022-02-28 PROCEDURE — 1125F AMNT PAIN NOTED PAIN PRSNT: CPT | Mod: CPTII,95,, | Performed by: INTERNAL MEDICINE

## 2022-02-28 PROCEDURE — 99499 RISK ADDL DX/OHS AUDIT: ICD-10-PCS | Mod: 95,,, | Performed by: INTERNAL MEDICINE

## 2022-02-28 PROCEDURE — 4010F PR ACE/ARB THEARPY RXD/TAKEN: ICD-10-PCS | Mod: CPTII,95,, | Performed by: INTERNAL MEDICINE

## 2022-02-28 PROCEDURE — 99499 UNLISTED E&M SERVICE: CPT | Mod: 95,,, | Performed by: INTERNAL MEDICINE

## 2022-02-28 PROCEDURE — 3051F HG A1C>EQUAL 7.0%<8.0%: CPT | Mod: CPTII,95,, | Performed by: INTERNAL MEDICINE

## 2022-02-28 PROCEDURE — 99215 PR OFFICE/OUTPT VISIT, EST, LEVL V, 40-54 MIN: ICD-10-PCS | Mod: 95,,, | Performed by: INTERNAL MEDICINE

## 2022-02-28 RX ORDER — NYSTATIN 100000 U/G
CREAM TOPICAL 3 TIMES DAILY PRN
Qty: 2 EACH | Refills: 11 | Status: SHIPPED | OUTPATIENT
Start: 2022-02-28 | End: 2022-08-11 | Stop reason: SDUPTHER

## 2022-02-28 RX ORDER — LAMIVUDINE 150 MG/1
150 TABLET, FILM COATED ORAL DAILY
Qty: 60 TABLET | Refills: 11 | Status: SHIPPED | OUTPATIENT
Start: 2022-02-28 | End: 2023-01-23

## 2022-02-28 RX ORDER — SARILUMAB 200 MG/1.14ML
200 INJECTION, SOLUTION SUBCUTANEOUS
Qty: 2 ML | Refills: 11 | Status: SHIPPED | OUTPATIENT
Start: 2022-02-28 | End: 2022-11-02

## 2022-02-28 NOTE — PROGRESS NOTES
Pre chart    Answers for HPI/ROS submitted by the patient on 2/28/2022  fever: No  eye redness: No  mouth sores: No  headaches: No  shortness of breath: No  chest pain: No  trouble swallowing: No  diarrhea: No  constipation: No  unexpected weight change: No  genital sore: No  During the last 3 days, have you had a skin rash?: No  Bruises or bleeds easily: Yes  cough: No

## 2022-03-02 ENCOUNTER — PATIENT MESSAGE (OUTPATIENT)
Dept: CARDIOLOGY | Facility: CLINIC | Age: 67
End: 2022-03-02
Payer: MEDICARE

## 2022-03-02 ENCOUNTER — LAB VISIT (OUTPATIENT)
Dept: LAB | Facility: OTHER | Age: 67
End: 2022-03-02
Attending: INTERNAL MEDICINE
Payer: MEDICARE

## 2022-03-02 DIAGNOSIS — E11.65 TYPE 2 DIABETES MELLITUS WITH HYPERGLYCEMIA, WITH LONG-TERM CURRENT USE OF INSULIN: ICD-10-CM

## 2022-03-02 DIAGNOSIS — Z79.4 TYPE 2 DIABETES MELLITUS WITH HYPERGLYCEMIA, WITH LONG-TERM CURRENT USE OF INSULIN: ICD-10-CM

## 2022-03-02 DIAGNOSIS — E78.2 MIXED HYPERLIPIDEMIA: ICD-10-CM

## 2022-03-02 LAB
ANION GAP SERPL CALC-SCNC: 9 MMOL/L (ref 8–16)
BUN SERPL-MCNC: 24 MG/DL (ref 8–23)
CALCIUM SERPL-MCNC: 9 MG/DL (ref 8.7–10.5)
CHLORIDE SERPL-SCNC: 104 MMOL/L (ref 95–110)
CHOLEST SERPL-MCNC: 178 MG/DL (ref 120–199)
CHOLEST/HDLC SERPL: 3.1 {RATIO} (ref 2–5)
CO2 SERPL-SCNC: 25 MMOL/L (ref 23–29)
CREAT SERPL-MCNC: 1 MG/DL (ref 0.5–1.4)
EST. GFR  (AFRICAN AMERICAN): >60 ML/MIN/1.73 M^2
EST. GFR  (NON AFRICAN AMERICAN): >60 ML/MIN/1.73 M^2
ESTIMATED AVG GLUCOSE: 157 MG/DL (ref 68–131)
GLUCOSE SERPL-MCNC: 103 MG/DL (ref 70–110)
HBA1C MFR BLD: 7.1 % (ref 4–5.6)
HDLC SERPL-MCNC: 58 MG/DL (ref 40–75)
HDLC SERPL: 32.6 % (ref 20–50)
LDLC SERPL CALC-MCNC: 99.2 MG/DL (ref 63–159)
NONHDLC SERPL-MCNC: 120 MG/DL
POTASSIUM SERPL-SCNC: 4.6 MMOL/L (ref 3.5–5.1)
SODIUM SERPL-SCNC: 138 MMOL/L (ref 136–145)
TRIGL SERPL-MCNC: 104 MG/DL (ref 30–150)

## 2022-03-02 PROCEDURE — 83036 HEMOGLOBIN GLYCOSYLATED A1C: CPT | Performed by: INTERNAL MEDICINE

## 2022-03-02 PROCEDURE — 80048 BASIC METABOLIC PNL TOTAL CA: CPT | Performed by: INTERNAL MEDICINE

## 2022-03-02 PROCEDURE — 80061 LIPID PANEL: CPT | Performed by: INTERNAL MEDICINE

## 2022-03-02 PROCEDURE — 36415 COLL VENOUS BLD VENIPUNCTURE: CPT | Performed by: INTERNAL MEDICINE

## 2022-03-02 RX ORDER — PEN NEEDLE, DIABETIC 30 GX3/16"
NEEDLE, DISPOSABLE MISCELLANEOUS
Qty: 1200 EACH | Refills: 3 | Status: SHIPPED | OUTPATIENT
Start: 2022-03-02 | End: 2022-04-07 | Stop reason: SDUPTHER

## 2022-03-02 RX ORDER — INSULIN ASPART 100 [IU]/ML
INJECTION, SOLUTION INTRAVENOUS; SUBCUTANEOUS
Qty: 75 ML | Refills: 3 | OUTPATIENT
Start: 2022-03-02

## 2022-03-02 RX ORDER — IMIQUIMOD 12.5 MG/.25G
CREAM TOPICAL
OUTPATIENT
Start: 2022-03-02

## 2022-03-03 ENCOUNTER — SPECIALTY PHARMACY (OUTPATIENT)
Dept: PHARMACY | Facility: CLINIC | Age: 67
End: 2022-03-03

## 2022-03-07 NOTE — TELEPHONE ENCOUNTER
[employer] submitted via [facility] (Key: [id number])    DOCUMENTATION ONLY:  Prior authorization for [employer] approved from 1/1/2022 to 12/31/2022    Case Id: [id number]    Co-pay: 226.34    Medicare, non-LIS.   Reached out to patient to see if he would like to apply for PAP.   Patient will come into [facility] to sign application and provide income documents. Forwarding to FA.

## 2022-03-08 DIAGNOSIS — Z79.4 TYPE 2 DIABETES MELLITUS WITH OTHER SPECIFIED COMPLICATION, WITH LONG-TERM CURRENT USE OF INSULIN: Primary | ICD-10-CM

## 2022-03-08 DIAGNOSIS — E11.69 TYPE 2 DIABETES MELLITUS WITH OTHER SPECIFIED COMPLICATION, WITH LONG-TERM CURRENT USE OF INSULIN: Primary | ICD-10-CM

## 2022-03-09 ENCOUNTER — OFFICE VISIT (OUTPATIENT)
Dept: ENDOCRINOLOGY | Facility: CLINIC | Age: 67
End: 2022-03-09
Payer: MEDICARE

## 2022-03-09 ENCOUNTER — PATIENT MESSAGE (OUTPATIENT)
Dept: ENDOCRINOLOGY | Facility: CLINIC | Age: 67
End: 2022-03-09

## 2022-03-09 DIAGNOSIS — R53.83 FATIGUE, UNSPECIFIED TYPE: ICD-10-CM

## 2022-03-09 DIAGNOSIS — Z79.4 TYPE 2 DIABETES MELLITUS WITH HYPERGLYCEMIA, WITH LONG-TERM CURRENT USE OF INSULIN: Primary | ICD-10-CM

## 2022-03-09 DIAGNOSIS — E66.09 CLASS 1 OBESITY DUE TO EXCESS CALORIES WITH SERIOUS COMORBIDITY AND BODY MASS INDEX (BMI) OF 33.0 TO 33.9 IN ADULT: ICD-10-CM

## 2022-03-09 DIAGNOSIS — E11.65 TYPE 2 DIABETES MELLITUS WITH HYPERGLYCEMIA, WITH LONG-TERM CURRENT USE OF INSULIN: Primary | ICD-10-CM

## 2022-03-09 PROCEDURE — 99215 OFFICE O/P EST HI 40 MIN: CPT | Mod: 95,,, | Performed by: INTERNAL MEDICINE

## 2022-03-09 PROCEDURE — 99499 UNLISTED E&M SERVICE: CPT | Mod: 95,,, | Performed by: INTERNAL MEDICINE

## 2022-03-09 PROCEDURE — 99499 RISK ADDL DX/OHS AUDIT: ICD-10-PCS | Mod: 95,,, | Performed by: INTERNAL MEDICINE

## 2022-03-09 PROCEDURE — 4010F ACE/ARB THERAPY RXD/TAKEN: CPT | Mod: CPTII,95,, | Performed by: INTERNAL MEDICINE

## 2022-03-09 PROCEDURE — 3051F HG A1C>EQUAL 7.0%<8.0%: CPT | Mod: CPTII,95,, | Performed by: INTERNAL MEDICINE

## 2022-03-09 PROCEDURE — 4010F PR ACE/ARB THEARPY RXD/TAKEN: ICD-10-PCS | Mod: CPTII,95,, | Performed by: INTERNAL MEDICINE

## 2022-03-09 PROCEDURE — 1159F PR MEDICATION LIST DOCUMENTED IN MEDICAL RECORD: ICD-10-PCS | Mod: CPTII,95,, | Performed by: INTERNAL MEDICINE

## 2022-03-09 PROCEDURE — 3051F PR MOST RECENT HEMOGLOBIN A1C LEVEL 7.0 - < 8.0%: ICD-10-PCS | Mod: CPTII,95,, | Performed by: INTERNAL MEDICINE

## 2022-03-09 PROCEDURE — 99215 PR OFFICE/OUTPT VISIT, EST, LEVL V, 40-54 MIN: ICD-10-PCS | Mod: 95,,, | Performed by: INTERNAL MEDICINE

## 2022-03-09 PROCEDURE — 1160F RVW MEDS BY RX/DR IN RCRD: CPT | Mod: CPTII,95,, | Performed by: INTERNAL MEDICINE

## 2022-03-09 PROCEDURE — 1159F MED LIST DOCD IN RCRD: CPT | Mod: CPTII,95,, | Performed by: INTERNAL MEDICINE

## 2022-03-09 PROCEDURE — 1160F PR REVIEW ALL MEDS BY PRESCRIBER/CLIN PHARMACIST DOCUMENTED: ICD-10-PCS | Mod: CPTII,95,, | Performed by: INTERNAL MEDICINE

## 2022-03-09 NOTE — ASSESSMENT & PLAN NOTE
BMI Readings from Last 1 Encounters:   12/22/21 34.04 kg/m²    complicated by HTN, HLD, diabetes   continue glp1, full dose   encourage pt to work on healthy diet, increase exercise as tolerated.   monitor weight

## 2022-03-09 NOTE — PATIENT INSTRUCTIONS
For the diabetes:    Last a1c was great.    Okay to continue the same regimen.    Trulicity 4.5 mg once a week  Tresiba 50 units twice a day  Novolog 10-30 units with meals based on meal content and pre-meal glucose

## 2022-03-09 NOTE — ASSESSMENT & PLAN NOTE
Pt notes worsening fatigue lately.   wondering about testosterone.   reviewed last lab showed testosterone 400s, normal range, recommend against supplementation at that level. due to risks of elevated HCT, stroke/cardiac risks, as well as prostate growth. Pt often has high HCT so would be complicated if supplementation needed.    Can recheck with testosterone panel to have complete evaluation. With BMI elevation, sometimes total testosterone is fine but free is actually low enough to benefit from supplementation.   reviewed other endocrine cause would be hypothyroidism. Can check TSH with next labs.   otherwise keep f/u with PCP to consideration into KAREN (pt notes hx KAREN, however has been unable to find a CPAP that he can tolerate), depression, b12 and iron deficiencies, and other causes.

## 2022-03-09 NOTE — ASSESSMENT & PLAN NOTE
Reviewed goals of therapy - to get the best control we can without hypoglycemia. Goal <7.5   - last a1c improving, below goal    Not having low sugars often.    Medication changes:    continue same.   GLP1, tresiba, novolog. Details in AVS. Continue adjustments based on pre-meal sugar and meal content    Reviewed patient's current insulin regimen. Clarified proper insulin dose and timing in relation to meals, etc. Insulin injection sites and proper rotation instructed.   - reviewed dose adjustments as well.   -Advised frequent self blood glucose monitoring. Patient encouraged to document glucose results and bring them to every clinic visit    -Hypoglycemia precautions discussed. Instructed on precautions before driving.    -Close adherence to lifestyle changes recommended.    -Periodic follow ups for eye evaluations, foot care suggested.

## 2022-03-09 NOTE — PROGRESS NOTES
Subjective:      Chief Complaint: Diabetes    The patient location is: [place]  The chief complaint leading to consultation is: diabetes    Visit type: audiovisual    Face to Face time with patient: 21 minutes  40 minutes of total time spent on the encounter, which includes face to face time and non-face to face time preparing to see the patient (eg, review of tests), Obtaining and/or reviewing separately obtained history, Documenting clinical information in the electronic or other health record, Independently interpreting results (not separately reported) and communicating results to the patient/family/caregiver, or Care coordination (not separately reported).    Each patient to whom he or she provides medical services by telemedicine is:  (1) informed of the relationship between the physician and patient and the respective role of any other health care provider with respect to management of the patient; and (2) notified that he or she may decline to receive medical services by telemedicine and may withdraw from such care at any time.    Notes:    HPI: Nithin Wagner is a 66 y.o. male who is here for a follow-up evaluation for diabetes. Last seen 11/3/2021    Patient reported in 2007, had heart issues (atrial myxoma), surgery, issues with anesthesia. Then had pain, f/u showed lung issues, then surgery 2008. Later RA, on mtx, other meds. Including steroids.   - currently on 25 mg prednisone per day (was on 10 mg/day last time, but doses up to 30 mg/day before). On enbrel, seemed to help, then dx HF so stopped. Working on consideration of another biologic, trying to taper steroids as able    With regards to the diabetes:  Diagnosed with T2DM since around [id number]. Had symptoms, sugar 300. Metformin initially, other pills, GLP1, then lately insulin.     Known complications:     Retinopathy? Denies    Nephropathy? No    Neuropathy? No    CAD? Yes, hx stents. Also HFpEF    CVA? TIA with myxoma, denies CVA in the  past.     Current regimen:   Trulicity: 4.5 mg/week   Basal insulin: Tresiba 50 units BID   Prandial insulin: Novolog TIDWM plus scale. Using InPen system. Average 30-75 units/day. Uses extra insulin if over 220s.     Breakfast: 10-15 units (depending on sugar)     Lunch: 15-25     Dinner: 15-25    Missed doses? denies     Prior treatments:    onglyza   metformin. Wasn't helping as much   other meds    Self-Monitored blood glucose.  # times a day testing: dexcom CGM  Log reviewed: scanned into media a few weeks ago.    Average 157 mg/dL  Standard Deviation 31 mg/dL  GMI 7.1%    Time in Range  <1% Very High  21% High  78% In Range  <1% Low  <1% Very Low    Elevated after meals mostly, but not always for very long    Hypoglycemic events? Rare. 92-96 the other night. Then suddenly dropped to 50s per patient.    Working on diet    Current Symptoms  No   Yes  [x]    []  Polydipsia  []    [x]  Polyuria, sometimes  []    [x]  Vision changes  [x]    []  Nausea  [x]    []  Diarrhea  [x]    []  Weight gain  []    [x]  Weight loss. 30 lbs so far  +Fatigue. Some decreased mood. Wondering about testosterone.    Last labs:    Lab Results   Component Value Date    TOTALTESTOST 442 11/04/2021    HGB 17.5 11/04/2021    HCT 53.1 11/04/2021    PSA 0.16 05/25/2021     Diabetes Management Status    Statin: Not taking  ACE/ARB: Taking     Screening or Prevention Patient's value Goal Complete/Controlled?   HgA1C Testing and Control   Lab Results   Component Value Date    HGBA1C 7.1 (H) 03/02/2022      q6months/Less than 7.5% Yes   Lipid profile : 03/02/2022 Annually Yes   LDL control Lab Results   Component Value Date    LDLCALC 99.2 03/02/2022    Annually/Less than 100 mg/dl  No   Nephropathy screening Lab Results   Component Value Date    MICALBCREAT 22.0 07/21/2021     Lab Results   Component Value Date    CREATININE 1.0 03/02/2022     No results found for: PROTEINUA Annually Yes   Blood pressure BP Readings from Last 1 Encounters:    12/22/21 (!) 154/92    Less than 140/90 Yes   Dilated retinal exam : 09/05/2017 Annually Yes   Foot exam   : 05/06/2021 Annually Yes     Reviewed past medical, family, social history and updated as appropriate.     Review of Systems  As above    Objective:   Prior vitals:  BP Readings from Last 5 Encounters:   12/22/21 (!) 154/92   12/04/21 (!) 154/92   11/04/21 122/67   11/03/21 (!) 147/80   08/26/21 124/82     Physical Exam  Constitutional:       General: He is not in acute distress.  Pulmonary:      Effort: Pulmonary effort is normal.       Wt Readings from Last 10 Encounters:   12/22/21 1537 113.9 kg (251 lb)   12/04/21 1648 113.9 kg (251 lb)   11/04/21 1552 114 kg (251 lb 7 oz)   11/03/21 1418 113.1 kg (249 lb 5.4 oz)   11/03/21 1326 113.1 kg (249 lb 5.4 oz)   08/26/21 1144 114 kg (251 lb 5.2 oz)   08/18/21 1321 116.1 kg (256 lb)   08/09/21 1529 116.3 kg (256 lb 6.3 oz)   07/30/21 1103 114 kg (251 lb 4.8 oz)   07/28/21 1302 116.4 kg (256 lb 9.9 oz)     Lab Results   Component Value Date    HGBA1C 7.1 (H) 03/02/2022     Lab Results   Component Value Date    CHOL 178 03/02/2022    HDL 58 03/02/2022    LDLCALC 99.2 03/02/2022    TRIG 104 03/02/2022    CHOLHDL 32.6 03/02/2022     Lab Results   Component Value Date     03/02/2022    K 4.6 03/02/2022     03/02/2022    CO2 25 03/02/2022     03/02/2022    BUN 24 (H) 03/02/2022    CREATININE 1.0 03/02/2022    CALCIUM 9.0 03/02/2022    PROT 6.7 01/24/2022    ALBUMIN 3.8 01/24/2022    BILITOT 0.4 01/24/2022    ALKPHOS 65 01/24/2022    AST 16 01/24/2022    ALT 28 01/24/2022    ANIONGAP 9 03/02/2022    ESTGFRAFRICA >60 03/02/2022    EGFRNONAA >60 03/02/2022    TSH 1.386 07/21/2021      Lab Results   Component Value Date    MICALBCREAT 22.0 07/21/2021     Assessment/Plan:     Type 2 diabetes mellitus with hyperglycemia, with long-term current use of insulin  Reviewed goals of therapy - to get the best control we can without hypoglycemia. Goal <7.5   -  last a1c improving, below goal    Not having low sugars often.    Medication changes:    continue same.   GLP1, tresiba, novolog. Details in AVS. Continue adjustments based on pre-meal sugar and meal content    Reviewed patient's current insulin regimen. Clarified proper insulin dose and timing in relation to meals, etc. Insulin injection sites and proper rotation instructed.   - reviewed dose adjustments as well.   -Advised frequent self blood glucose monitoring. Patient encouraged to document glucose results and bring them to every clinic visit    -Hypoglycemia precautions discussed. Instructed on precautions before driving.    -Close adherence to lifestyle changes recommended.    -Periodic follow ups for eye evaluations, foot care suggested.        Class 1 obesity due to excess calories with serious comorbidity and body mass index (BMI) of 33.0 to 33.9 in adult    BMI Readings from Last 1 Encounters:   12/22/21 34.04 kg/m²    complicated by HTN, HLD, diabetes   continue glp1, full dose   encourage pt to work on healthy diet, increase exercise as tolerated.   monitor weight      Fatigue  Pt notes worsening fatigue lately.   wondering about testosterone.   reviewed last lab showed testosterone 400s, normal range, recommend against supplementation at that level. due to risks of elevated HCT, stroke/cardiac risks, as well as prostate growth. Pt often has high HCT so would be complicated if supplementation needed.    Can recheck with testosterone panel to have complete evaluation. With BMI elevation, sometimes total testosterone is fine but free is actually low enough to benefit from supplementation.   reviewed other endocrine cause would be hypothyroidism. Can check TSH with next labs.   otherwise keep f/u with PCP to consideration into KAREN (pt notes hx KAREN, however has been unable to find a CPAP that he can tolerate), depression, b12 and iron deficiencies, and other causes.      Follow up in about 4 months (around  7/9/2022) for lab review, further monitoring.      Kevin Lares MD  Endocrinology

## 2022-03-10 ENCOUNTER — PATIENT MESSAGE (OUTPATIENT)
Dept: ENDOCRINOLOGY | Facility: CLINIC | Age: 67
End: 2022-03-10
Payer: MEDICARE

## 2022-03-15 ENCOUNTER — CLINICAL SUPPORT (OUTPATIENT)
Dept: DIABETES | Facility: CLINIC | Age: 67
End: 2022-03-15
Payer: MEDICARE

## 2022-03-15 ENCOUNTER — PATIENT MESSAGE (OUTPATIENT)
Dept: DIABETES | Facility: CLINIC | Age: 67
End: 2022-03-15

## 2022-03-15 DIAGNOSIS — Z79.4 TYPE 2 DIABETES MELLITUS WITH OTHER SPECIFIED COMPLICATION, WITH LONG-TERM CURRENT USE OF INSULIN: ICD-10-CM

## 2022-03-15 DIAGNOSIS — E11.69 TYPE 2 DIABETES MELLITUS WITH OTHER SPECIFIED COMPLICATION, WITH LONG-TERM CURRENT USE OF INSULIN: ICD-10-CM

## 2022-03-15 PROCEDURE — G0108 DIAB MANAGE TRN  PER INDIV: HCPCS | Mod: 95,,, | Performed by: FAMILY MEDICINE

## 2022-03-15 PROCEDURE — G0108 PR DIAB MANAGE TRN  PER INDIV: ICD-10-PCS | Mod: 95,,, | Performed by: FAMILY MEDICINE

## 2022-03-15 RX ORDER — POTASSIUM CHLORIDE 750 MG/1
10 CAPSULE, EXTENDED RELEASE ORAL DAILY
COMMUNITY
Start: 2022-02-18 | End: 2023-03-01

## 2022-03-16 ENCOUNTER — PATIENT MESSAGE (OUTPATIENT)
Dept: DIABETES | Facility: CLINIC | Age: 67
End: 2022-03-16
Payer: MEDICARE
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
## 2022-03-24 NOTE — TELEPHONE ENCOUNTER
3/24-Outgoing call to pt to see if he is still coming to [facility] to sign his PAP application. Pt said he had a bad week and will come on Monday.

## 2022-03-30 NOTE — TELEPHONE ENCOUNTER
3/30-Received a fax from [employer] that the prior authorization letter was missing. Pt went to [employer] and signed the pt portion of PAP and the [employer] submitted the PAP application to [employer]. Faxed the PA approval letter to Jhonny Campa.

## 2022-04-01 ENCOUNTER — PATIENT MESSAGE (OUTPATIENT)
Dept: DIABETES | Facility: CLINIC | Age: 67
End: 2022-04-01
Payer: MEDICARE

## 2022-04-01 ENCOUNTER — PATIENT MESSAGE (OUTPATIENT)
Dept: ENDOCRINOLOGY | Facility: CLINIC | Age: 67
End: 2022-04-01
Payer: MEDICARE

## 2022-04-01 DIAGNOSIS — E11.65 TYPE 2 DIABETES MELLITUS WITH HYPERGLYCEMIA, WITH LONG-TERM CURRENT USE OF INSULIN: ICD-10-CM

## 2022-04-01 DIAGNOSIS — Z79.4 TYPE 2 DIABETES MELLITUS WITH HYPERGLYCEMIA, WITH LONG-TERM CURRENT USE OF INSULIN: ICD-10-CM

## 2022-04-06 NOTE — TELEPHONE ENCOUNTER
Received a letter from [facility] that the alexis's PAP for Orencia has been extended to 12/31/22. This extension is being provided an error in their system. However, the patient was switched to [employer].     Outgiong call to pt to check the status of PAP application. Pt has not been able to make it to [facility] to sign his portion. Pt requested the PAP application be emailed to email on file.     Emailed pt portion of PAP

## 2022-04-07 ENCOUNTER — PATIENT MESSAGE (OUTPATIENT)
Dept: CARDIOLOGY | Facility: CLINIC | Age: 67
End: 2022-04-07
Payer: MEDICARE

## 2022-04-07 ENCOUNTER — PATIENT MESSAGE (OUTPATIENT)
Dept: ENDOCRINOLOGY | Facility: CLINIC | Age: 67
End: 2022-04-07
Payer: MEDICARE

## 2022-04-07 DIAGNOSIS — I50.22 CHRONIC SYSTOLIC HEART FAILURE: Primary | ICD-10-CM

## 2022-04-07 RX ORDER — LANCETS
EACH MISCELLANEOUS
Qty: 300 EACH | Refills: 11 | Status: SHIPPED | OUTPATIENT
Start: 2022-04-07 | End: 2022-08-20

## 2022-04-07 RX ORDER — PEN NEEDLE, DIABETIC 30 GX3/16"
NEEDLE, DISPOSABLE MISCELLANEOUS
Qty: 500 EACH | Refills: 3 | Status: SHIPPED | OUTPATIENT
Start: 2022-04-07 | End: 2022-11-03 | Stop reason: SDUPTHER

## 2022-04-07 RX ORDER — INSULIN ASPART 100 [IU]/ML
INJECTION, SOLUTION INTRAVENOUS; SUBCUTANEOUS
Qty: 30 EACH | Refills: 11 | Status: SHIPPED | OUTPATIENT
Start: 2022-04-07 | End: 2022-11-03 | Stop reason: SDUPTHER

## 2022-04-08 ENCOUNTER — PATIENT MESSAGE (OUTPATIENT)
Dept: RHEUMATOLOGY | Facility: CLINIC | Age: 67
End: 2022-04-08
Payer: MEDICARE

## 2022-04-08 DIAGNOSIS — M06.9 RHEUMATOID ARTHRITIS FLARE: Primary | ICD-10-CM

## 2022-04-08 RX ORDER — HYDROCHLOROTHIAZIDE 25 MG/1
25 TABLET ORAL DAILY
Qty: 30 TABLET | Refills: 11 | Status: ON HOLD | OUTPATIENT
Start: 2022-04-08 | End: 2023-01-18 | Stop reason: HOSPADM

## 2022-04-08 RX ORDER — PREDNISONE 2.5 MG/1
7.5 TABLET ORAL DAILY
Qty: 90 TABLET | Refills: 4 | Status: SHIPPED | OUTPATIENT
Start: 2022-04-08 | End: 2022-04-08 | Stop reason: SDUPTHER

## 2022-04-08 RX ORDER — PREDNISONE 2.5 MG/1
12.5 TABLET ORAL DAILY
Qty: 150 TABLET | Refills: 2 | Status: SHIPPED | OUTPATIENT
Start: 2022-04-08 | End: 2023-08-23

## 2022-04-09 ENCOUNTER — PATIENT MESSAGE (OUTPATIENT)
Dept: CARDIOLOGY | Facility: CLINIC | Age: 67
End: 2022-04-09
Payer: MEDICARE

## 2022-04-11 ENCOUNTER — PATIENT MESSAGE (OUTPATIENT)
Dept: DIABETES | Facility: CLINIC | Age: 67
End: 2022-04-11
Payer: MEDICARE

## 2022-04-11 DIAGNOSIS — Z79.4 TYPE 2 DIABETES MELLITUS WITH OTHER SPECIFIED COMPLICATION, WITH LONG-TERM CURRENT USE OF INSULIN: Primary | ICD-10-CM

## 2022-04-11 DIAGNOSIS — E11.69 TYPE 2 DIABETES MELLITUS WITH OTHER SPECIFIED COMPLICATION, WITH LONG-TERM CURRENT USE OF INSULIN: Primary | ICD-10-CM

## 2022-04-14 ENCOUNTER — PATIENT MESSAGE (OUTPATIENT)
Dept: DIABETES | Facility: CLINIC | Age: 67
End: 2022-04-14
Payer: MEDICARE

## 2022-04-14 RX ORDER — EMPAGLIFLOZIN 10 MG/1
10 TABLET, FILM COATED ORAL DAILY
Qty: 30 TABLET | Refills: 0 | Status: SHIPPED | OUTPATIENT
Start: 2022-04-14 | End: 2022-08-20

## 2022-04-17 DIAGNOSIS — I25.118 ATHEROSCLEROSIS OF NATIVE CORONARY ARTERY OF NATIVE HEART WITH STABLE ANGINA PECTORIS: ICD-10-CM

## 2022-04-18 ENCOUNTER — PATIENT MESSAGE (OUTPATIENT)
Dept: ADMINISTRATIVE | Facility: OTHER | Age: 67
End: 2022-04-18
Payer: MEDICARE

## 2022-04-18 RX ORDER — NITROGLYCERIN 0.4 MG/1
0.4 TABLET SUBLINGUAL EVERY 5 MIN PRN
Qty: 25 TABLET | Refills: 11 | Status: SHIPPED | OUTPATIENT
Start: 2022-04-18 | End: 2022-11-09 | Stop reason: SDUPTHER

## 2022-04-18 NOTE — TELEPHONE ENCOUNTER
Outgoing call to pt to check status of PAP application. Pt will try to come to [facility] tomorrow to sign. He has been sick.

## 2022-04-19 RX ORDER — PREDNISONE 5 MG/1
5 TABLET ORAL DAILY
Qty: 90 TABLET | Refills: 1 | Status: SHIPPED | OUTPATIENT
Start: 2022-04-19 | End: 2022-08-25

## 2022-04-22 ENCOUNTER — PATIENT MESSAGE (OUTPATIENT)
Dept: PHARMACY | Facility: CLINIC | Age: 67
End: 2022-04-22
Payer: MEDICARE

## 2022-04-22 ENCOUNTER — LAB VISIT (OUTPATIENT)
Dept: LAB | Facility: OTHER | Age: 67
End: 2022-04-22
Attending: INTERNAL MEDICINE
Payer: MEDICARE

## 2022-04-22 DIAGNOSIS — R53.83 FATIGUE, UNSPECIFIED TYPE: ICD-10-CM

## 2022-04-22 DIAGNOSIS — M06.9 RHEUMATOID ARTHRITIS INVOLVING MULTIPLE JOINTS: ICD-10-CM

## 2022-04-22 DIAGNOSIS — D45 POLYCYTHEMIA VERA: ICD-10-CM

## 2022-04-22 DIAGNOSIS — R22.32 WRIST LUMP, LEFT: ICD-10-CM

## 2022-04-22 DIAGNOSIS — Z79.4 TYPE 2 DIABETES MELLITUS WITH HYPERGLYCEMIA, WITH LONG-TERM CURRENT USE OF INSULIN: ICD-10-CM

## 2022-04-22 DIAGNOSIS — I50.22 CHRONIC SYSTOLIC HEART FAILURE: ICD-10-CM

## 2022-04-22 DIAGNOSIS — E11.65 TYPE 2 DIABETES MELLITUS WITH HYPERGLYCEMIA, WITH LONG-TERM CURRENT USE OF INSULIN: ICD-10-CM

## 2022-04-22 LAB
ALBUMIN SERPL BCP-MCNC: 3.9 G/DL (ref 3.5–5.2)
ALP SERPL-CCNC: 70 U/L (ref 55–135)
ALT SERPL W/O P-5'-P-CCNC: 65 U/L (ref 10–44)
ANION GAP SERPL CALC-SCNC: 17 MMOL/L (ref 8–16)
AST SERPL-CCNC: 27 U/L (ref 10–40)
BASOPHILS # BLD AUTO: 0.05 K/UL (ref 0–0.2)
BASOPHILS NFR BLD: 0.5 % (ref 0–1.9)
BILIRUB SERPL-MCNC: 0.4 MG/DL (ref 0.1–1)
BUN SERPL-MCNC: 17 MG/DL (ref 8–23)
CALCIUM SERPL-MCNC: 10.2 MG/DL (ref 8.7–10.5)
CHLORIDE SERPL-SCNC: 98 MMOL/L (ref 95–110)
CO2 SERPL-SCNC: 27 MMOL/L (ref 23–29)
CREAT SERPL-MCNC: 1.1 MG/DL (ref 0.5–1.4)
CRP SERPL-MCNC: 0.2 MG/L (ref 0–8.2)
DIFFERENTIAL METHOD: ABNORMAL
EOSINOPHIL # BLD AUTO: 0.1 K/UL (ref 0–0.5)
EOSINOPHIL NFR BLD: 0.5 % (ref 0–8)
ERYTHROCYTE [DISTWIDTH] IN BLOOD BY AUTOMATED COUNT: 12.7 % (ref 11.5–14.5)
ERYTHROCYTE [SEDIMENTATION RATE] IN BLOOD: 2 MM/HR (ref 0–10)
EST. GFR  (AFRICAN AMERICAN): >60 ML/MIN/1.73 M^2
EST. GFR  (NON AFRICAN AMERICAN): >60 ML/MIN/1.73 M^2
ESTIMATED AVG GLUCOSE: 169 MG/DL (ref 68–131)
GLUCOSE SERPL-MCNC: 124 MG/DL (ref 70–110)
HBA1C MFR BLD: 7.5 % (ref 4–5.6)
HCT VFR BLD AUTO: 58.8 % (ref 40–54)
HGB BLD-MCNC: 19.4 G/DL (ref 14–18)
IMM GRANULOCYTES # BLD AUTO: 0.12 K/UL (ref 0–0.04)
IMM GRANULOCYTES NFR BLD AUTO: 1.1 % (ref 0–0.5)
LYMPHOCYTES # BLD AUTO: 1.7 K/UL (ref 1–4.8)
LYMPHOCYTES NFR BLD: 16.4 % (ref 18–48)
MCH RBC QN AUTO: 34.2 PG (ref 27–31)
MCHC RBC AUTO-ENTMCNC: 33 G/DL (ref 32–36)
MCV RBC AUTO: 104 FL (ref 82–98)
MONOCYTES # BLD AUTO: 0.8 K/UL (ref 0.3–1)
MONOCYTES NFR BLD: 7.2 % (ref 4–15)
NEUTROPHILS # BLD AUTO: 7.8 K/UL (ref 1.8–7.7)
NEUTROPHILS NFR BLD: 74.3 % (ref 38–73)
NRBC BLD-RTO: 0 /100 WBC
PLATELET # BLD AUTO: 193 K/UL (ref 150–450)
PMV BLD AUTO: 11.6 FL (ref 9.2–12.9)
POTASSIUM SERPL-SCNC: 4.5 MMOL/L (ref 3.5–5.1)
PROT SERPL-MCNC: 7 G/DL (ref 6–8.4)
RBC # BLD AUTO: 5.68 M/UL (ref 4.6–6.2)
SODIUM SERPL-SCNC: 142 MMOL/L (ref 136–145)
TSH SERPL DL<=0.005 MIU/L-ACNC: 1.37 UIU/ML (ref 0.4–4)
WBC # BLD AUTO: 10.55 K/UL (ref 3.9–12.7)

## 2022-04-22 PROCEDURE — 84443 ASSAY THYROID STIM HORMONE: CPT | Performed by: INTERNAL MEDICINE

## 2022-04-22 PROCEDURE — 86140 C-REACTIVE PROTEIN: CPT | Performed by: INTERNAL MEDICINE

## 2022-04-22 PROCEDURE — 87340 HEPATITIS B SURFACE AG IA: CPT | Performed by: INTERNAL MEDICINE

## 2022-04-22 PROCEDURE — 83036 HEMOGLOBIN GLYCOSYLATED A1C: CPT | Performed by: INTERNAL MEDICINE

## 2022-04-22 PROCEDURE — 86480 TB TEST CELL IMMUN MEASURE: CPT | Performed by: INTERNAL MEDICINE

## 2022-04-22 PROCEDURE — 85025 COMPLETE CBC W/AUTO DIFF WBC: CPT | Performed by: INTERNAL MEDICINE

## 2022-04-22 PROCEDURE — 82040 ASSAY OF SERUM ALBUMIN: CPT | Performed by: INTERNAL MEDICINE

## 2022-04-22 PROCEDURE — 80053 COMPREHEN METABOLIC PANEL: CPT | Performed by: INTERNAL MEDICINE

## 2022-04-22 PROCEDURE — 87517 HEPATITIS B DNA QUANT: CPT | Performed by: INTERNAL MEDICINE

## 2022-04-22 PROCEDURE — 85651 RBC SED RATE NONAUTOMATED: CPT | Performed by: INTERNAL MEDICINE

## 2022-04-22 PROCEDURE — 86704 HEP B CORE ANTIBODY TOTAL: CPT | Performed by: INTERNAL MEDICINE

## 2022-04-22 NOTE — TELEPHONE ENCOUNTER
4/22-Outgoing call to patient to check the status of PAP application. No answer, LVM and sent [employer] message.

## 2022-04-26 ENCOUNTER — CLINICAL SUPPORT (OUTPATIENT)
Dept: DIABETES | Facility: CLINIC | Age: 67
End: 2022-04-26
Payer: MEDICARE

## 2022-04-26 ENCOUNTER — PATIENT MESSAGE (OUTPATIENT)
Dept: RHEUMATOLOGY | Facility: CLINIC | Age: 67
End: 2022-04-26
Payer: MEDICARE

## 2022-04-26 DIAGNOSIS — E11.65 TYPE 2 DIABETES MELLITUS WITH HYPERGLYCEMIA, WITH LONG-TERM CURRENT USE OF INSULIN: Primary | ICD-10-CM

## 2022-04-26 DIAGNOSIS — Z79.4 TYPE 2 DIABETES MELLITUS WITH HYPERGLYCEMIA, WITH LONG-TERM CURRENT USE OF INSULIN: Primary | ICD-10-CM

## 2022-04-26 PROCEDURE — G0108 PR DIAB MANAGE TRN  PER INDIV: ICD-10-PCS | Mod: 95,,, | Performed by: FAMILY MEDICINE

## 2022-04-26 PROCEDURE — G0108 DIAB MANAGE TRN  PER INDIV: HCPCS | Mod: 95,,, | Performed by: FAMILY MEDICINE

## 2022-04-27 ENCOUNTER — PATIENT MESSAGE (OUTPATIENT)
Dept: DIABETES | Facility: CLINIC | Age: 67
End: 2022-04-27
Payer: MEDICARE

## 2022-04-27 ENCOUNTER — PATIENT MESSAGE (OUTPATIENT)
Dept: ENDOCRINOLOGY | Facility: CLINIC | Age: 67
End: 2022-04-27
Payer: MEDICARE

## 2022-04-27 LAB
ALBUMIN SERPL-MCNC: 4.1 G/DL (ref 3.6–5.1)
HBV DNA SERPL NAA+PROBE-ACNC: <10 IU/ML
HBV DNA SERPL NAA+PROBE-LOG IU: <1 LOG (10) IU/ML
HBV DNA SERPL QL NAA+PROBE: NOT DETECTED
MAYO MISCELLANEOUS RESULT (REF): NORMAL
SHBG SERPL-SCNC: 62 NMOL/L (ref 22–77)
TESTOST FREE SERPL-MCNC: 18.1 PG/ML (ref 46–224)
TESTOST SERPL-MCNC: 245 NG/DL (ref 250–1100)
TESTOSTERONE.FREE+WB SERPL-MCNC: 34 NG/DL (ref 110–575)
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
## 2022-04-27 NOTE — TELEPHONE ENCOUNTER
4/27-Pt responded to [employer] message. He has not been feeling well but feels better now and will be coming to [facility] to sign today.

## 2022-04-28 ENCOUNTER — PATIENT MESSAGE (OUTPATIENT)
Dept: ENDOCRINOLOGY | Facility: CLINIC | Age: 67
End: 2022-04-28
Payer: MEDICARE

## 2022-04-28 ENCOUNTER — TELEPHONE (OUTPATIENT)
Dept: ENDOCRINOLOGY | Facility: CLINIC | Age: 67
End: 2022-04-28
Payer: MEDICARE

## 2022-04-28 DIAGNOSIS — R79.89 LOW TESTOSTERONE: Primary | ICD-10-CM

## 2022-04-28 DIAGNOSIS — Z12.5 ENCOUNTER FOR SCREENING FOR MALIGNANT NEOPLASM OF PROSTATE: ICD-10-CM

## 2022-04-29 NOTE — TELEPHONE ENCOUNTER
4/29-outgoing call to pt to check the status of PAP. He did not come to [facility] on 4/27. No answer, LVM.

## 2022-05-02 NOTE — TELEPHONE ENCOUNTER
Pt came to [facility] to sign PAP application. Could not find income documents.     Submitted PAP to Jhonny Campa

## 2022-05-03 NOTE — TELEPHONE ENCOUNTER
Patient is approved for [employer] Patient Assistance Program as of 05/03/22 through 12/31/22 and will receive Kevzara free of charge through the programs dispensing pharmacy, [facility]. Someone from the program's pharmacy will be reaching out to patient to coordinate their first shipment. Patient can also contact [employer] [facility] Patient Assistance Program at [phone]  to check on the status of their prescription and schedule shipment for medication.        FOR DOCUMENTATION ONLY:  Financial Assistance for Kevzara is approved from 05/03/22 to 12/31/22  Source: [employer] [place] Patient Assistance Program  Case ID: [id number]  Phone: [phone]  Fax: [phone]   Pharmacy: [facility]    Outgoing call to pt to inform him of PAP approval.

## 2022-05-05 ENCOUNTER — PATIENT MESSAGE (OUTPATIENT)
Dept: RHEUMATOLOGY | Facility: CLINIC | Age: 67
End: 2022-05-05

## 2022-05-05 ENCOUNTER — OFFICE VISIT (OUTPATIENT)
Dept: RHEUMATOLOGY | Facility: CLINIC | Age: 67
End: 2022-05-05
Payer: MEDICARE

## 2022-05-05 DIAGNOSIS — R10.9 ABDOMINAL PAIN, UNSPECIFIED ABDOMINAL LOCATION: Primary | ICD-10-CM

## 2022-05-05 DIAGNOSIS — Z79.899 ENCOUNTER FOR LONG-TERM CURRENT USE OF HIGH RISK MEDICATION: ICD-10-CM

## 2022-05-05 DIAGNOSIS — D45 POLYCYTHEMIA VERA: ICD-10-CM

## 2022-05-05 DIAGNOSIS — M06.9 RHEUMATOID ARTHRITIS FLARE: Primary | ICD-10-CM

## 2022-05-05 LAB
HBV CORE AB SERPL QL IA: POSITIVE
HBV SURFACE AG SERPL QL IA: NEGATIVE

## 2022-05-05 PROCEDURE — 99214 PR OFFICE/OUTPT VISIT, EST, LEVL IV, 30-39 MIN: ICD-10-PCS | Mod: 95,,, | Performed by: INTERNAL MEDICINE

## 2022-05-05 PROCEDURE — 4010F ACE/ARB THERAPY RXD/TAKEN: CPT | Mod: CPTII,95,, | Performed by: INTERNAL MEDICINE

## 2022-05-05 PROCEDURE — 99214 OFFICE O/P EST MOD 30 MIN: CPT | Mod: 95,,, | Performed by: INTERNAL MEDICINE

## 2022-05-05 PROCEDURE — 3051F HG A1C>EQUAL 7.0%<8.0%: CPT | Mod: CPTII,95,, | Performed by: INTERNAL MEDICINE

## 2022-05-05 PROCEDURE — 99499 UNLISTED E&M SERVICE: CPT | Mod: 95,,, | Performed by: INTERNAL MEDICINE

## 2022-05-05 PROCEDURE — 99499 RISK ADDL DX/OHS AUDIT: ICD-10-PCS | Mod: 95,,, | Performed by: INTERNAL MEDICINE

## 2022-05-05 PROCEDURE — 4010F PR ACE/ARB THEARPY RXD/TAKEN: ICD-10-PCS | Mod: CPTII,95,, | Performed by: INTERNAL MEDICINE

## 2022-05-05 PROCEDURE — 3051F PR MOST RECENT HEMOGLOBIN A1C LEVEL 7.0 - < 8.0%: ICD-10-PCS | Mod: CPTII,95,, | Performed by: INTERNAL MEDICINE

## 2022-05-05 NOTE — PROGRESS NOTES
Answers for HPI/ROS submitted by the patient on 5/4/2022  fever: No  eye redness: No  mouth sores: No  headaches: Yes  shortness of breath: Yes  chest pain: No  trouble swallowing: Yes  diarrhea: No  constipation: No  unexpected weight change: No  genital sore: No  During the last 3 days, have you had a skin rash?: No  Bruises or bleeds easily: Yes  cough: No

## 2022-05-05 NOTE — PROGRESS NOTES
Subjective:       Patient ID: Nithin Wagner is a 64 y.o. male.    Chief Complaint: No chief complaint on file.    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   He was 53 when he was diagnosed with RA. He reports he reports he was having pain and swelling in knees and ankles. He then he had involvement in hands and shoulders. He was put on MTX.  He reports that the methotrexate did help with joints but it caused confusion so he stopped it for 2 years. He was taken aleve which helped his pain for 2 years.Then he developed HTN on NSAIDS so he stopped them.  He was then put on hydrocodone 5 QID to control his pain. Reports that warm weather improves his pain. He is back on methotrexate. Today, it will be his 5th dose. He is taking folic acid 1 mg po qday.  He has never tried up to 8 pills once a week. He is taking prednisone 10mg po BID for 4 weeks. He still has some pain in ankles and knees.  He does get mild swelling in knees and ankles. He was previously on prednisone 40mg a day. He smokes 1 pack per day for 40 years    family hx:cousin: connective tissue disease    Interval history:he has not started kevzara.   He is on prednisone 25 mg a day,  He stopped Orencia a month ago with no difference.   He is fatigued.   He reports that his pain is now 6/10. He has pain in right knee and right shoulder. He gets episodes of swelling in wrists and hands.   He is getting swelling of both knees, worse on right.He reports he has a lumbar compression deformity from prior injury.   Denies PUD or diverticulitis.          Past Medical History:   Diagnosis Date    Arthritis     Diabetes mellitus     Hyperlipidemia     Hypertension        Review of Systems      Review of Systems   Constitutional: Negative for fever.   Eyes: Negative for redness.   Respiratory: Negative for  cough, hemoptysis, sputum production and shortness of breath.    Cardiovascular: Negative for chest pain.   Gastrointestinal: Negative for constipation and diarrhea.   Musculoskeletal: Positive for joint pain.   Skin: Negative for rash.   Neurological: Negative for headaches.   Endo/Heme/Allergies: Does not bruise/bleed easily.         Objective:   There were no vitals taken for this visit.     Physical Exam   Constitutional: He is oriented to person, place, and time and well-developed, well-nourished, and in no distress. No distress.   HENT:   Head: Normocephalic and atraumatic.   Right Ear: External ear normal.   Eyes: Conjunctivae and EOM are normal. Pupils are equal, round, and reactive to light. Right eye exhibits no discharge. Left eye exhibits no discharge. No scleral icterus.   Neck: Normal range of motion. Neck supple. No JVD present. No tracheal deviation present. No thyromegaly present.   Cardiovascular: Normal rate and regular rhythm.    Pulmonary/Chest: Effort normal and breath sounds normal. No stridor. No respiratory distress. He has no wheezes. He has no rales. He exhibits no tenderness.   Abdominal: Soft. Bowel sounds are normal. He exhibits no distension and no mass. There is no tenderness. There is no rebound and no guarding.   Lymphadenopathy:     He has no cervical adenopathy.   Neurological: He is alert and oriented to person, place, and time. He displays normal reflexes. No cranial nerve deficit. He exhibits normal muscle tone. Coordination normal.   Skin: Skin is warm and dry. No rash noted. He is not diaphoretic. No erythema. No pallor.     Musculoskeletal: He exhibits edema and tenderness. He exhibits no deformity.      labs: reviewed  Results for CECILIA MEAD (MRN [id number]) as of 4/14/2020 12:44   Ref. Range 4/7/2020 13:52   CCP Antibodies Latest Ref Range: <5.0 U/mL 336.9 (H)   Rheumatoid Factor Latest Ref Range: 0.0 - 15.0 IU/mL 456.0 (H)       No data to display     Assessment:     65  year old with PMH of  Type II DM, HTN, ?gout, right atrial myxoma s/p surgery in 2018,  CAD s/p 3 stents, PE, right upper lobectomy secondary to right atrial myxoma, latent TB s/p INH x6 in 1982,Polycythemia, MARI  most likely secondary to tobacco use and pulmonary insufficiency  here  For follow up of  rheumatoid arthritis. He was requiring high doses of steroid to function when we initially met.    Given that he has +hep B core antibody, he was started on lamivudine by hepatology but he had to stop due to GI upset.  I took him off  methotrexate give his baseline thrombocytopenia, mild LFT elevation, and prior lung surgery.  He was doing good on enbrel but developed CHF.      He is not able to function with less than prednisone 17.5mg a day. I TOLD HIM  I am concerned about long term side effects of steroids.  I was going to switch him to orencia infusions but insurance company did not pay for it.  He is planning on starting on kevzara after he comes back in town.  He has recent CMP elevation and muscle cramping so will repeat cmp.    Plan:       Problem List Items Addressed This Visit     None      -start kevzara injections (Risks of TNF inhibitor discussed with patient and not limited to cell count abnormalities, malignancy,  GI perforation,allergic  reaction to medication, and increased risk of infection. Patient agrees with starting medication.)  -labs in 4 weeks  No MTX given MARI and baseline thrombocytopenia, poor lung reserve  Continue folic acid 1 mg po qday   -continue  lamivudine for Hep B core antibody  Continue bactrim MWF       No anti-tnf given CHF  No rosales kinase given history of PE          The patient location is: home  The chief complaint leading to consultation is: joint pain    Visit type: audiovisual    Face to Face time with patient: 30   minutes of total time spent on the encounter, which includes face to face time and non-face to face time preparing to see the patient (eg, review of tests),  Obtaining and/or reviewing separately obtained history, Documenting clinical information in the electronic or other health record, Independently interpreting results (not separately reported) and communicating results to the patient/family/caregiver, or Care coordination (not separately reported).         Each patient to whom he or she provides medical services by telemedicine is:  (1) informed of the relationship between the physician and patient and the respective role of any other health care provider with respect to management of the patient; and (2) notified that he or she may decline to receive medical services by telemedicine and may withdraw from such care at any time.    Notes:

## 2022-05-30 ENCOUNTER — LAB VISIT (OUTPATIENT)
Dept: LAB | Facility: OTHER | Age: 67
End: 2022-05-30
Attending: INTERNAL MEDICINE
Payer: MEDICARE

## 2022-05-30 DIAGNOSIS — M06.9 RHEUMATOID ARTHRITIS FLARE: ICD-10-CM

## 2022-05-30 LAB
ALBUMIN SERPL BCP-MCNC: 3.6 G/DL (ref 3.5–5.2)
ALP SERPL-CCNC: 68 U/L (ref 55–135)
ALT SERPL W/O P-5'-P-CCNC: 31 U/L (ref 10–44)
ANION GAP SERPL CALC-SCNC: 10 MMOL/L (ref 8–16)
AST SERPL-CCNC: 20 U/L (ref 10–40)
BILIRUB SERPL-MCNC: 0.3 MG/DL (ref 0.1–1)
BUN SERPL-MCNC: 17 MG/DL (ref 8–23)
CALCIUM SERPL-MCNC: 9.5 MG/DL (ref 8.7–10.5)
CHLORIDE SERPL-SCNC: 104 MMOL/L (ref 95–110)
CK SERPL-CCNC: 68 U/L (ref 20–200)
CO2 SERPL-SCNC: 25 MMOL/L (ref 23–29)
CREAT SERPL-MCNC: 1.1 MG/DL (ref 0.5–1.4)
EST. GFR  (AFRICAN AMERICAN): >60 ML/MIN/1.73 M^2
EST. GFR  (NON AFRICAN AMERICAN): >60 ML/MIN/1.73 M^2
GLUCOSE SERPL-MCNC: 173 MG/DL (ref 70–110)
POTASSIUM SERPL-SCNC: 4.6 MMOL/L (ref 3.5–5.1)
PROT SERPL-MCNC: 6.9 G/DL (ref 6–8.4)
SODIUM SERPL-SCNC: 139 MMOL/L (ref 136–145)

## 2022-05-30 PROCEDURE — 82550 ASSAY OF CK (CPK): CPT | Performed by: INTERNAL MEDICINE

## 2022-05-30 PROCEDURE — 80053 COMPREHEN METABOLIC PANEL: CPT | Performed by: INTERNAL MEDICINE

## 2022-05-30 PROCEDURE — 36415 COLL VENOUS BLD VENIPUNCTURE: CPT | Performed by: INTERNAL MEDICINE

## 2022-06-06 ENCOUNTER — CLINICAL SUPPORT (OUTPATIENT)
Dept: DIABETES | Facility: CLINIC | Age: 67
End: 2022-06-06
Payer: MEDICARE

## 2022-06-06 DIAGNOSIS — E11.65 TYPE 2 DIABETES MELLITUS WITH HYPERGLYCEMIA, WITH LONG-TERM CURRENT USE OF INSULIN: Primary | ICD-10-CM

## 2022-06-06 DIAGNOSIS — Z79.4 TYPE 2 DIABETES MELLITUS WITH HYPERGLYCEMIA, WITH LONG-TERM CURRENT USE OF INSULIN: Primary | ICD-10-CM

## 2022-06-06 PROCEDURE — G0108 DIAB MANAGE TRN  PER INDIV: HCPCS | Mod: 95,,, | Performed by: FAMILY MEDICINE

## 2022-06-06 PROCEDURE — G0108 PR DIAB MANAGE TRN  PER INDIV: ICD-10-PCS | Mod: 95,,, | Performed by: FAMILY MEDICINE
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
## 2022-06-21 ENCOUNTER — LAB VISIT (OUTPATIENT)
Dept: LAB | Facility: OTHER | Age: 67
End: 2022-06-21
Attending: INTERNAL MEDICINE
Payer: MEDICARE

## 2022-06-21 ENCOUNTER — PATIENT MESSAGE (OUTPATIENT)
Dept: RHEUMATOLOGY | Facility: CLINIC | Age: 67
End: 2022-06-21
Payer: MEDICARE

## 2022-06-21 DIAGNOSIS — R79.89 LOW TESTOSTERONE: ICD-10-CM

## 2022-06-21 DIAGNOSIS — Z12.5 ENCOUNTER FOR SCREENING FOR MALIGNANT NEOPLASM OF PROSTATE: ICD-10-CM

## 2022-06-21 LAB
BASOPHILS # BLD AUTO: 0.07 K/UL (ref 0–0.2)
BASOPHILS NFR BLD: 0.8 % (ref 0–1.9)
COMPLEXED PSA SERPL-MCNC: 0.18 NG/ML (ref 0–4)
DIFFERENTIAL METHOD: ABNORMAL
EOSINOPHIL # BLD AUTO: 0.2 K/UL (ref 0–0.5)
EOSINOPHIL NFR BLD: 2.4 % (ref 0–8)
ERYTHROCYTE [DISTWIDTH] IN BLOOD BY AUTOMATED COUNT: 12.6 % (ref 11.5–14.5)
FSH SERPL-ACNC: 5.93 MIU/ML (ref 0.95–11.95)
HCT VFR BLD AUTO: 54.1 % (ref 40–54)
HGB BLD-MCNC: 17.9 G/DL (ref 14–18)
IMM GRANULOCYTES # BLD AUTO: 0.07 K/UL (ref 0–0.04)
IMM GRANULOCYTES NFR BLD AUTO: 0.8 % (ref 0–0.5)
LH SERPL-ACNC: 2.4 MIU/ML (ref 0.6–12.1)
LYMPHOCYTES # BLD AUTO: 3.1 K/UL (ref 1–4.8)
LYMPHOCYTES NFR BLD: 34 % (ref 18–48)
MCH RBC QN AUTO: 34.2 PG (ref 27–31)
MCHC RBC AUTO-ENTMCNC: 33.1 G/DL (ref 32–36)
MCV RBC AUTO: 103 FL (ref 82–98)
MONOCYTES # BLD AUTO: 0.7 K/UL (ref 0.3–1)
MONOCYTES NFR BLD: 8 % (ref 4–15)
NEUTROPHILS # BLD AUTO: 5 K/UL (ref 1.8–7.7)
NEUTROPHILS NFR BLD: 54 % (ref 38–73)
NRBC BLD-RTO: 0 /100 WBC
PLATELET # BLD AUTO: 202 K/UL (ref 150–450)
PMV BLD AUTO: 11.3 FL (ref 9.2–12.9)
PROLACTIN SERPL IA-MCNC: 28.8 NG/ML (ref 3.5–19.4)
RBC # BLD AUTO: 5.24 M/UL (ref 4.6–6.2)
WBC # BLD AUTO: 9.16 K/UL (ref 3.9–12.7)

## 2022-06-21 PROCEDURE — 82040 ASSAY OF SERUM ALBUMIN: CPT | Performed by: INTERNAL MEDICINE

## 2022-06-21 PROCEDURE — 36415 COLL VENOUS BLD VENIPUNCTURE: CPT | Performed by: INTERNAL MEDICINE

## 2022-06-21 PROCEDURE — 85025 COMPLETE CBC W/AUTO DIFF WBC: CPT | Performed by: INTERNAL MEDICINE

## 2022-06-21 PROCEDURE — 84153 ASSAY OF PSA TOTAL: CPT | Performed by: INTERNAL MEDICINE

## 2022-06-21 PROCEDURE — 84146 ASSAY OF PROLACTIN: CPT | Performed by: INTERNAL MEDICINE

## 2022-06-21 PROCEDURE — 83001 ASSAY OF GONADOTROPIN (FSH): CPT | Performed by: INTERNAL MEDICINE

## 2022-06-21 PROCEDURE — 83002 ASSAY OF GONADOTROPIN (LH): CPT | Performed by: INTERNAL MEDICINE

## 2022-06-23 ENCOUNTER — OFFICE VISIT (OUTPATIENT)
Dept: RHEUMATOLOGY | Facility: CLINIC | Age: 67
End: 2022-06-23
Payer: MEDICARE

## 2022-06-23 DIAGNOSIS — M06.9 RHEUMATOID ARTHRITIS FLARE: Primary | ICD-10-CM

## 2022-06-23 PROCEDURE — 99214 OFFICE O/P EST MOD 30 MIN: CPT | Mod: 95,,, | Performed by: INTERNAL MEDICINE

## 2022-06-23 PROCEDURE — 4010F PR ACE/ARB THEARPY RXD/TAKEN: ICD-10-PCS | Mod: CPTII,95,, | Performed by: INTERNAL MEDICINE

## 2022-06-23 PROCEDURE — 99499 UNLISTED E&M SERVICE: CPT | Mod: 95,,, | Performed by: INTERNAL MEDICINE

## 2022-06-23 PROCEDURE — 3051F HG A1C>EQUAL 7.0%<8.0%: CPT | Mod: CPTII,95,, | Performed by: INTERNAL MEDICINE

## 2022-06-23 PROCEDURE — 99214 PR OFFICE/OUTPT VISIT, EST, LEVL IV, 30-39 MIN: ICD-10-PCS | Mod: 95,,, | Performed by: INTERNAL MEDICINE

## 2022-06-23 PROCEDURE — 99499 RISK ADDL DX/OHS AUDIT: ICD-10-PCS | Mod: 95,,, | Performed by: INTERNAL MEDICINE

## 2022-06-23 PROCEDURE — 3051F PR MOST RECENT HEMOGLOBIN A1C LEVEL 7.0 - < 8.0%: ICD-10-PCS | Mod: CPTII,95,, | Performed by: INTERNAL MEDICINE

## 2022-06-23 PROCEDURE — 4010F ACE/ARB THERAPY RXD/TAKEN: CPT | Mod: CPTII,95,, | Performed by: INTERNAL MEDICINE

## 2022-06-23 RX ORDER — ONDANSETRON 4 MG/1
4 TABLET, FILM COATED ORAL EVERY 8 HOURS PRN
Qty: 60 TABLET | Refills: 6 | Status: SHIPPED | OUTPATIENT
Start: 2022-06-23 | End: 2023-01-23 | Stop reason: SDUPTHER

## 2022-06-23 NOTE — PROGRESS NOTES
Answers for HPI/ROS submitted by the patient on 6/23/2022  fever: No  eye redness: No  mouth sores: Yes  headaches: No  shortness of breath: No  chest pain: No  trouble swallowing: No  diarrhea: No  constipation: Yes  unexpected weight change: No  genital sore: No  During the last 3 days, have you had a skin rash?: No  Bruises or bleeds easily: Yes  cough: No

## 2022-06-23 NOTE — PROGRESS NOTES
Subjective:       Patient ID: Nithin Wagner is a 64 y.o. male.    Chief Complaint: No chief complaint on file.    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   He was 53 when he was diagnosed with RA. He reports he reports he was having pain and swelling in knees and ankles. He then he had involvement in hands and shoulders. He was put on MTX.  He reports that the methotrexate did help with joints but it caused confusion so he stopped it for 2 years. He was taken aleve which helped his pain for 2 years.Then he developed HTN on NSAIDS so he stopped them.  He was then put on hydrocodone 5 QID to control his pain. Reports that warm weather improves his pain. He is back on methotrexate. Today, it will be his 5th dose. He is taking folic acid 1 mg po qday.  He has never tried up to 8 pills once a week. He is taking prednisone 10mg po BID for 4 weeks. He still has some pain in ankles and knees.  He does get mild swelling in knees and ankles. He was previously on prednisone 40mg a day. He smokes 1 pack per day for 40 years    family hx:cousin: connective tissue disease    Interval history:he has been throwing a up at night every night for 2 months.  He noticed symptoms after increasing trulicity.  He is on prednisone 20mg a day.   He is fatigued.   He reports that his pain is now 6/10. He has pain in right knee and right shoulder. He gets episodes of swelling in wrists and hands.   He is getting swelling of both knees, worse on right.He reports he has a lumbar compression deformity from prior injury.   Denies PUD or diverticulitis.          Past Medical History:   Diagnosis Date    Arthritis     Diabetes mellitus     Hyperlipidemia     Hypertension        Review of Systems      Review of Systems   Constitutional: Negative for fever.   Eyes: Negative for redness.    Respiratory: Negative for cough, hemoptysis, sputum production and shortness of breath.    Cardiovascular: Negative for chest pain.   Gastrointestinal: Negative for constipation and diarrhea.   Musculoskeletal: Positive for joint pain.   Skin: Negative for rash.   Neurological: Negative for headaches.   Endo/Heme/Allergies: Does not bruise/bleed easily.         Objective:   There were no vitals taken for this visit.     Physical Exam   Constitutional: He is oriented to person, place, and time and well-developed, well-nourished, and in no distress. No distress.   HENT:   Head: Normocephalic and atraumatic.   Right Ear: External ear normal.   Eyes: Conjunctivae and EOM are normal. Pupils are equal, round, and reactive to light. Right eye exhibits no discharge. Left eye exhibits no discharge. No scleral icterus.   Neck: Normal range of motion. Neck supple. No JVD present. No tracheal deviation present. No thyromegaly present.   Cardiovascular: Normal rate and regular rhythm.    Pulmonary/Chest: Effort normal and breath sounds normal. No stridor. No respiratory distress. He has no wheezes. He has no rales. He exhibits no tenderness.   Abdominal: Soft. Bowel sounds are normal. He exhibits no distension and no mass. There is no tenderness. There is no rebound and no guarding.   Lymphadenopathy:     He has no cervical adenopathy.   Neurological: He is alert and oriented to person, place, and time. He displays normal reflexes. No cranial nerve deficit. He exhibits normal muscle tone. Coordination normal.   Skin: Skin is warm and dry. No rash noted. He is not diaphoretic. No erythema. No pallor.     Musculoskeletal: He exhibits edema and tenderness. He exhibits no deformity.      labs: reviewed  Results for CECILIA MEAD (MRN [id number]) as of 4/14/2020 12:44   Ref. Range 4/7/2020 13:52   CCP Antibodies Latest Ref Range: <5.0 U/mL 336.9 (H)   Rheumatoid Factor Latest Ref Range: 0.0 - 15.0 IU/mL 456.0 (H)       No data to  display     Assessment:     65 year old with PMH of  Type II DM, HTN, ?gout, right atrial myxoma s/p surgery in 2018,  CAD s/p 3 stents, PE, right upper lobectomy secondary to right atrial myxoma, latent TB s/p INH x6 in 1982,Polycythemia, MARI  most likely secondary to tobacco use and pulmonary insufficiency  here  For follow up of  rheumatoid arthritis. He was requiring high doses of steroid to function when we initially met.    Given that he has +hep B core antibody, he was started on lamivudine by hepatology but he had to stop due to GI upset.  I took him off  methotrexate give his baseline thrombocytopenia, mild LFT elevation, and prior lung surgery.  He was doing good on enbrel but developed CHF.      He is not able to function with less than prednisone 17.5mg a day. I TOLD HIM  I am concerned about long term side effects of steroids.  I was going to switch him to orencia infusions but insurance company did not pay for it.  He is planning on starting on kevzara today.  His main issue now is vomiting after increasing dose of trulicity.    Plan:       Problem List Items Addressed This Visit     None      -start kevzara injections (Risks of TNF inhibitor discussed with patient and not limited to cell count abnormalities, malignancy,  GI perforation,allergic  reaction to medication, and increased risk of infection. Patient agrees with starting medication.)  -labs in 4 weeks  No MTX given MARI and baseline thrombocytopenia, poor lung reserve  Continue folic acid 1 mg po qday   -continue  lamivudine for Hep B core antibody  Follow up with GI  Continue bactrim MWF       No anti-tnf given CHF  No rosales kinase given history of PE          The patient location is: home  The chief complaint leading to consultation is: joint pain    Visit type: audiovisual    Face to Face time with patient: 30   minutes of total time spent on the encounter, which includes face to face time and non-face to face time preparing to see the  patient (eg, review of tests), Obtaining and/or reviewing separately obtained history, Documenting clinical information in the electronic or other health record, Independently interpreting results (not separately reported) and communicating results to the patient/family/caregiver, or Care coordination (not separately reported).         Each patient to whom he or she provides medical services by telemedicine is:  (1) informed of the relationship between the physician and patient and the respective role of any other health care provider with respect to management of the patient; and (2) notified that he or she may decline to receive medical services by telemedicine and may withdraw from such care at any time.    Notes:

## 2022-06-26 LAB
ALBUMIN SERPL-MCNC: 4.1 G/DL (ref 3.6–5.1)
SHBG SERPL-SCNC: 79 NMOL/L (ref 22–77)
TESTOST FREE SERPL-MCNC: 33.8 PG/ML (ref 46–224)
TESTOST SERPL-MCNC: 542 NG/DL (ref 250–1100)
TESTOSTERONE.FREE+WB SERPL-MCNC: 63.7 NG/DL (ref 110–575)

## 2022-06-27 ENCOUNTER — OFFICE VISIT (OUTPATIENT)
Dept: ENDOCRINOLOGY | Facility: CLINIC | Age: 67
End: 2022-06-27
Payer: MEDICARE

## 2022-06-27 DIAGNOSIS — I10 ESSENTIAL HYPERTENSION: ICD-10-CM

## 2022-06-27 DIAGNOSIS — E11.65 TYPE 2 DIABETES MELLITUS WITH HYPERGLYCEMIA, WITH LONG-TERM CURRENT USE OF INSULIN: Primary | ICD-10-CM

## 2022-06-27 DIAGNOSIS — E22.1 HYPERPROLACTINEMIA: ICD-10-CM

## 2022-06-27 DIAGNOSIS — R53.83 FATIGUE, UNSPECIFIED TYPE: ICD-10-CM

## 2022-06-27 DIAGNOSIS — Z79.4 TYPE 2 DIABETES MELLITUS WITH HYPERGLYCEMIA, WITH LONG-TERM CURRENT USE OF INSULIN: Primary | ICD-10-CM

## 2022-06-27 DIAGNOSIS — E66.09 CLASS 1 OBESITY DUE TO EXCESS CALORIES WITH SERIOUS COMORBIDITY AND BODY MASS INDEX (BMI) OF 34.0 TO 34.9 IN ADULT: ICD-10-CM

## 2022-06-27 PROCEDURE — 99214 PR OFFICE/OUTPT VISIT, EST, LEVL IV, 30-39 MIN: ICD-10-PCS | Mod: 95,,, | Performed by: INTERNAL MEDICINE

## 2022-06-27 PROCEDURE — 4010F PR ACE/ARB THEARPY RXD/TAKEN: ICD-10-PCS | Mod: CPTII,95,, | Performed by: INTERNAL MEDICINE

## 2022-06-27 PROCEDURE — 1159F MED LIST DOCD IN RCRD: CPT | Mod: CPTII,95,, | Performed by: INTERNAL MEDICINE

## 2022-06-27 PROCEDURE — 99214 OFFICE O/P EST MOD 30 MIN: CPT | Mod: 95,,, | Performed by: INTERNAL MEDICINE

## 2022-06-27 PROCEDURE — 4010F ACE/ARB THERAPY RXD/TAKEN: CPT | Mod: CPTII,95,, | Performed by: INTERNAL MEDICINE

## 2022-06-27 PROCEDURE — 3051F PR MOST RECENT HEMOGLOBIN A1C LEVEL 7.0 - < 8.0%: ICD-10-PCS | Mod: CPTII,95,, | Performed by: INTERNAL MEDICINE

## 2022-06-27 PROCEDURE — 1159F PR MEDICATION LIST DOCUMENTED IN MEDICAL RECORD: ICD-10-PCS | Mod: CPTII,95,, | Performed by: INTERNAL MEDICINE

## 2022-06-27 PROCEDURE — 3051F HG A1C>EQUAL 7.0%<8.0%: CPT | Mod: CPTII,95,, | Performed by: INTERNAL MEDICINE

## 2022-06-27 PROCEDURE — 1160F RVW MEDS BY RX/DR IN RCRD: CPT | Mod: CPTII,95,, | Performed by: INTERNAL MEDICINE

## 2022-06-27 PROCEDURE — 1160F PR REVIEW ALL MEDS BY PRESCRIBER/CLIN PHARMACIST DOCUMENTED: ICD-10-PCS | Mod: CPTII,95,, | Performed by: INTERNAL MEDICINE

## 2022-06-27 NOTE — ASSESSMENT & PLAN NOTE
BMI Readings from Last 1 Encounters:   12/22/21 34.04 kg/m²    complicated by HTN, HLD, diabetes   continue glp1, full dose for now. Though if unable to tolerate, try to go back to 3 mg.     In future, can consider GLP1 GIP agents if covered okay.   encourage pt to work on healthy diet, increase exercise as tolerated.   monitor weight

## 2022-06-27 NOTE — ASSESSMENT & PLAN NOTE
Reviewed goals of therapy - to get the best control we can without hypoglycemia. Goal <7.5   - last a1c pretty much at goal    Not having low sugars often. Some overnight though. Can drop insulin dose a few units in the afternoon.    Medication changes:    continue same. Lower insulin dose.   45 units tresiba qAM, 35 units qPM.   if nausea/vomit not better, decrease GLP1   continue novolog. Continue adjustments based on pre-meal sugar and meal content    Reviewed patient's current insulin regimen. Clarified proper insulin dose and timing in relation to meals, etc. Insulin injection sites and proper rotation instructed.   - reviewed dose adjustments as well.   -Advised frequent self blood glucose monitoring. Patient encouraged to document glucose results and bring them to every clinic visit    -Hypoglycemia precautions discussed. Instructed on precautions before driving.    -Close adherence to lifestyle changes recommended.    -Periodic follow ups for eye evaluations, foot care suggested.

## 2022-06-27 NOTE — PROGRESS NOTES
Subjective:      Chief Complaint: Diabetes    The patient location is: [place]  The chief complaint leading to consultation is: diabetes    Visit type: audiovisual    Face to Face time with patient: 24 minutes  32 minutes of total time spent on the encounter, which includes face to face time and non-face to face time preparing to see the patient (eg, review of tests), Obtaining and/or reviewing separately obtained history, Documenting clinical information in the electronic or other health record, Independently interpreting results (not separately reported) and communicating results to the patient/family/caregiver, or Care coordination (not separately reported).    Each patient to whom he or she provides medical services by telemedicine is:  (1) informed of the relationship between the physician and patient and the respective role of any other health care provider with respect to management of the patient; and (2) notified that he or she may decline to receive medical services by telemedicine and may withdraw from such care at any time.    Notes:    HPI: Nithin Wagner is a 66 y.o. male who is here for a follow-up evaluation for diabetes. Last seen 3/9/2022    Patient reported in 2007, had heart issues (atrial myxoma), surgery, issues with anesthesia. Then had pain, f/u showed lung issues, then surgery 2008. Later RA, on mtx, other meds. Including steroids.   - currently on 20 mg prednisone per day (was on 25 mg/day last time, but doses up to 30 mg/day before). Working with rheumatology on other agents.    With regards to the diabetes:  Diagnosed with T2DM since around 8917-1998. Had symptoms, sugar 300. Metformin initially, other pills, GLP1, then lately insulin.     Known complications:    Retinopathy? Denies    Nephropathy? No    Neuropathy? No    CAD? Yes, hx stents. Also HFpEF    CVA? TIA with myxoma, denies CVA in the past     Current regimen:   Trulicity: 4.5 mg/week. Tuesdays.   Basal insulin: Tresiba 50 units  qAM, 38 units qPM   Prandial insulin: Novolog 25 units TIDWM plus scale. Using InPen system. Average 60-75 units/day. Sometimes up to 100 units/day.    Period        Daily Bolus  Daily Correction   Daily Basal           Bolus%         Basal%   Today          44.5 U          9.2 U   50 U   47.1%/52.9%            7 days          73.7 U          41.1 U          84.8 U           46.5%/53.5%            14 days        75.9 U          36.5 U          86.4 U           46.8%/53.2%            30 days        67.4 U          33.9 U          87.8 U           43.4%/56.6%            90 days        83.7 U          49.9 U          98.4 U           46%/54%          Missed doses? denies     Prior treatments:    onglyza   metformin. Wasn't helping as much   other meds    Self-Monitored blood glucose.  # times a day testing: dexcom CGM  Log reviewed: scanned into media a few weeks ago.    Glucose   Average Glucose 157mg/dL   Standard Deviation 36mg/dL   GMI 7.1%    Time in Range   <1% Very High   24% High  75% In Range  <1% Low  0% Very Low    Elevated after meals mostly, but not always for very long    Hypoglycemic events? Rare. 92-96 the other night. Then suddenly dropped to 50s per patient.    Working on diet    Current Symptoms  No   Yes  [x]    []  Polydipsia  []    [x]  Polyuria, sometimes  []    [x]  Vision changes  []    [x]  Nausea, throw up. Wednesday/thursday/friday, GLP 1 on tuesdays. Happens in the evenings/overnight, doesn't seem related to food.  [x]    []  Diarrhea  [x]    []  Weight gain  []    [x]  Weight loss. 30 lbs or so. Fairly stable recently, down a few more lbs    Constipation. Started miralax.  Skin changes    +Fatigue.    Lower libido.  No ED.    Last labs:    Lab Results   Component Value Date    TESTOSTERONE 542 06/21/2022    TESTOSTERONE 33.8 (L) 06/21/2022    FSH 5.93 06/21/2022    LABLH 2.4 06/21/2022    TOTALTESTOST 442 11/04/2021    HGB 17.9 06/21/2022    HCT 54.1 (H) 06/21/2022    PSA 0.18 06/21/2022      Diabetes Management Status    Statin: Not taking  ACE/ARB: Taking     Screening or Prevention Patient's value Goal Complete/Controlled?   HgA1C Testing and Control   Lab Results   Component Value Date    HGBA1C 7.5 (H) 04/22/2022      q6months/Less than 7.5% Yes   Lipid profile : 03/02/2022 Annually Yes   LDL control Lab Results   Component Value Date    LDLCALC 99.2 03/02/2022    Annually/Less than 100 mg/dl  No   Nephropathy screening Lab Results   Component Value Date    MICALBCREAT 22.0 07/21/2021     Lab Results   Component Value Date    CREATININE 1.1 05/30/2022     No results found for: PROTEINUA Annually Yes   Blood pressure BP Readings from Last 1 Encounters:   12/22/21 (!) 154/92    Less than 140/90 Yes   Dilated retinal exam : 09/05/2017 Annually No   Foot exam   : 05/06/2021 Annually No     Reviewed past medical, family, social history and updated as appropriate.     Review of Systems  As above    Objective:   Prior vitals:  BP Readings from Last 5 Encounters:   12/22/21 (!) 154/92   12/04/21 (!) 154/92   11/04/21 122/67   11/03/21 (!) 147/80   08/26/21 124/82     Physical Exam  Constitutional:       General: He is not in acute distress.  Pulmonary:      Effort: Pulmonary effort is normal.       Wt Readings from Last 10 Encounters:   12/22/21 1537 113.9 kg (251 lb)   12/04/21 1648 113.9 kg (251 lb)   11/04/21 1552 114 kg (251 lb 7 oz)   11/03/21 1418 113.1 kg (249 lb 5.4 oz)   11/03/21 1326 113.1 kg (249 lb 5.4 oz)   08/26/21 1144 114 kg (251 lb 5.2 oz)   08/18/21 1321 116.1 kg (256 lb)   08/09/21 1529 116.3 kg (256 lb 6.3 oz)   07/30/21 1103 114 kg (251 lb 4.8 oz)   07/28/21 1302 116.4 kg (256 lb 9.9 oz)     Lab Results   Component Value Date    HGBA1C 7.5 (H) 04/22/2022     Lab Results   Component Value Date    CHOL 178 03/02/2022    HDL 58 03/02/2022    LDLCALC 99.2 03/02/2022    TRIG 104 03/02/2022    CHOLHDL 32.6 03/02/2022     Lab Results   Component Value Date     05/30/2022    K 4.6  05/30/2022     05/30/2022    CO2 25 05/30/2022     (H) 05/30/2022    BUN 17 05/30/2022    CREATININE 1.1 05/30/2022    CALCIUM 9.5 05/30/2022    PROT 6.9 05/30/2022    ALBUMIN 4.1 06/21/2022    BILITOT 0.3 05/30/2022    ALKPHOS 68 05/30/2022    AST 20 05/30/2022    ALT 31 05/30/2022    ANIONGAP 10 05/30/2022    ESTGFRAFRICA >60 05/30/2022    EGFRNONAA >60 05/30/2022    TSH 1.369 04/22/2022      Lab Results   Component Value Date    MICALBCREAT 22.0 07/21/2021     Assessment/Plan:     Type 2 diabetes mellitus with hyperglycemia, with long-term current use of insulin  Reviewed goals of therapy - to get the best control we can without hypoglycemia. Goal <7.5   - last a1c pretty much at goal    Not having low sugars often. Some overnight though. Can drop insulin dose a few units in the afternoon.    Medication changes:    continue same. Lower insulin dose.   45 units tresiba qAM, 35 units qPM.   if nausea/vomit not better, decrease GLP1   continue novolog. Continue adjustments based on pre-meal sugar and meal content    Reviewed patient's current insulin regimen. Clarified proper insulin dose and timing in relation to meals, etc. Insulin injection sites and proper rotation instructed.   - reviewed dose adjustments as well.   -Advised frequent self blood glucose monitoring. Patient encouraged to document glucose results and bring them to every clinic visit    -Hypoglycemia precautions discussed. Instructed on precautions before driving.    -Close adherence to lifestyle changes recommended.    -Periodic follow ups for eye evaluations, foot care suggested.      Essential hypertension  bp okay at home  continue meds  F/u with PCP, monitor      Class 1 obesity due to excess calories with serious comorbidity and body mass index (BMI) of 34.0 to 34.9 in adult    BMI Readings from Last 1 Encounters:   12/22/21 34.04 kg/m²    complicated by HTN, HLD, diabetes   continue glp1, full dose for now. Though if unable to  tolerate, try to go back to 3 mg.     In future, can consider GLP1 GIP agents if covered okay.   encourage pt to work on healthy diet, increase exercise as tolerated.   monitor weight      Fatigue  Free testosterone low.     but HCT already high.   not safe to initiate supplementation at this time   can f/u with hematology if interested for further evaluation of HCT elevation, if that improves and symptoms persist would be able to start testosterone supplementation.   otherwise monitor from time to time      Follow up in about 4 months (around 10/27/2022).      Kevin Lares MD  Endocrinology

## 2022-06-28 NOTE — ASSESSMENT & PLAN NOTE
Free testosterone low.     but HCT already high.   not safe to initiate supplementation at this time   can f/u with hematology if interested for further evaluation of HCT elevation, if that improves and symptoms persist would be able to start testosterone supplementation.   otherwise monitor from time to time

## 2022-06-30 ENCOUNTER — PATIENT MESSAGE (OUTPATIENT)
Dept: RHEUMATOLOGY | Facility: CLINIC | Age: 67
End: 2022-06-30
Payer: MEDICARE

## 2022-06-30 ENCOUNTER — PATIENT MESSAGE (OUTPATIENT)
Dept: GASTROENTEROLOGY | Facility: CLINIC | Age: 67
End: 2022-06-30
Payer: MEDICARE

## 2022-07-05 ENCOUNTER — PATIENT MESSAGE (OUTPATIENT)
Dept: DIABETES | Facility: CLINIC | Age: 67
End: 2022-07-05

## 2022-07-05 ENCOUNTER — CLINICAL SUPPORT (OUTPATIENT)
Dept: DIABETES | Facility: CLINIC | Age: 67
End: 2022-07-05
Payer: MEDICARE

## 2022-07-05 DIAGNOSIS — Z79.4 TYPE 2 DIABETES MELLITUS WITH HYPERGLYCEMIA, WITH LONG-TERM CURRENT USE OF INSULIN: Primary | ICD-10-CM

## 2022-07-05 DIAGNOSIS — E11.65 TYPE 2 DIABETES MELLITUS WITH HYPERGLYCEMIA, WITH LONG-TERM CURRENT USE OF INSULIN: Primary | ICD-10-CM

## 2022-07-05 PROCEDURE — G0108 DIAB MANAGE TRN  PER INDIV: HCPCS | Mod: 95,,, | Performed by: FAMILY MEDICINE

## 2022-07-05 PROCEDURE — G0108 PR DIAB MANAGE TRN  PER INDIV: ICD-10-PCS | Mod: 95,,, | Performed by: FAMILY MEDICINE
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
## 2022-07-21 ENCOUNTER — LAB VISIT (OUTPATIENT)
Dept: LAB | Facility: OTHER | Age: 67
End: 2022-07-21
Attending: INTERNAL MEDICINE
Payer: MEDICARE

## 2022-07-21 DIAGNOSIS — M06.9 RHEUMATOID ARTHRITIS FLARE: ICD-10-CM

## 2022-07-21 LAB
ALBUMIN SERPL BCP-MCNC: 3.8 G/DL (ref 3.5–5.2)
ALP SERPL-CCNC: 61 U/L (ref 55–135)
ALT SERPL W/O P-5'-P-CCNC: 31 U/L (ref 10–44)
ANION GAP SERPL CALC-SCNC: 12 MMOL/L (ref 8–16)
AST SERPL-CCNC: 21 U/L (ref 10–40)
BASOPHILS # BLD AUTO: 0.04 K/UL (ref 0–0.2)
BASOPHILS NFR BLD: 0.6 % (ref 0–1.9)
BILIRUB SERPL-MCNC: 0.6 MG/DL (ref 0.1–1)
BUN SERPL-MCNC: 13 MG/DL (ref 8–23)
CALCIUM SERPL-MCNC: 9.4 MG/DL (ref 8.7–10.5)
CHLORIDE SERPL-SCNC: 103 MMOL/L (ref 95–110)
CO2 SERPL-SCNC: 25 MMOL/L (ref 23–29)
CREAT SERPL-MCNC: 1.1 MG/DL (ref 0.5–1.4)
CRP SERPL-MCNC: 0.4 MG/L (ref 0–8.2)
DIFFERENTIAL METHOD: ABNORMAL
EOSINOPHIL # BLD AUTO: 0.2 K/UL (ref 0–0.5)
EOSINOPHIL NFR BLD: 3 % (ref 0–8)
ERYTHROCYTE [DISTWIDTH] IN BLOOD BY AUTOMATED COUNT: 12.7 % (ref 11.5–14.5)
ERYTHROCYTE [SEDIMENTATION RATE] IN BLOOD: 2 MM/HR (ref 0–10)
EST. GFR  (AFRICAN AMERICAN): >60 ML/MIN/1.73 M^2
EST. GFR  (NON AFRICAN AMERICAN): >60 ML/MIN/1.73 M^2
GLUCOSE SERPL-MCNC: 126 MG/DL (ref 70–110)
HCT VFR BLD AUTO: 54.3 % (ref 40–54)
HGB BLD-MCNC: 18.1 G/DL (ref 14–18)
IMM GRANULOCYTES # BLD AUTO: 0.03 K/UL (ref 0–0.04)
IMM GRANULOCYTES NFR BLD AUTO: 0.4 % (ref 0–0.5)
LYMPHOCYTES # BLD AUTO: 2.3 K/UL (ref 1–4.8)
LYMPHOCYTES NFR BLD: 33.2 % (ref 18–48)
MCH RBC QN AUTO: 33.7 PG (ref 27–31)
MCHC RBC AUTO-ENTMCNC: 33.3 G/DL (ref 32–36)
MCV RBC AUTO: 101 FL (ref 82–98)
MONOCYTES # BLD AUTO: 0.7 K/UL (ref 0.3–1)
MONOCYTES NFR BLD: 9.7 % (ref 4–15)
NEUTROPHILS # BLD AUTO: 3.7 K/UL (ref 1.8–7.7)
NEUTROPHILS NFR BLD: 53.1 % (ref 38–73)
NRBC BLD-RTO: 0 /100 WBC
PLATELET # BLD AUTO: 139 K/UL (ref 150–450)
PMV BLD AUTO: 11.9 FL (ref 9.2–12.9)
POTASSIUM SERPL-SCNC: 4.3 MMOL/L (ref 3.5–5.1)
PROT SERPL-MCNC: 7 G/DL (ref 6–8.4)
RBC # BLD AUTO: 5.37 M/UL (ref 4.6–6.2)
SODIUM SERPL-SCNC: 140 MMOL/L (ref 136–145)
WBC # BLD AUTO: 7.04 K/UL (ref 3.9–12.7)

## 2022-07-21 PROCEDURE — 80053 COMPREHEN METABOLIC PANEL: CPT | Performed by: INTERNAL MEDICINE

## 2022-07-21 PROCEDURE — 85025 COMPLETE CBC W/AUTO DIFF WBC: CPT | Performed by: INTERNAL MEDICINE

## 2022-07-21 PROCEDURE — 85651 RBC SED RATE NONAUTOMATED: CPT | Performed by: INTERNAL MEDICINE

## 2022-07-21 PROCEDURE — 86140 C-REACTIVE PROTEIN: CPT | Performed by: INTERNAL MEDICINE

## 2022-07-21 PROCEDURE — 36415 COLL VENOUS BLD VENIPUNCTURE: CPT | Performed by: INTERNAL MEDICINE

## 2022-07-22 ENCOUNTER — PATIENT MESSAGE (OUTPATIENT)
Dept: PODIATRY | Facility: CLINIC | Age: 67
End: 2022-07-22
Payer: MEDICARE

## 2022-07-22 ENCOUNTER — TELEPHONE (OUTPATIENT)
Dept: HEPATOLOGY | Facility: CLINIC | Age: 67
End: 2022-07-22
Payer: MEDICARE

## 2022-07-22 NOTE — TELEPHONE ENCOUNTER
[facility] pharmacy call in regards to rx clarification on dosage for lamiVUDine (EPIVIR) 150 MG Tab   Call      The dose is still meant to be 150 mg daily.   Nithin Garza    Call placed to [facility] [phone]. Spoke to [facility] then transferred to Jose Urban.  Message relayed from chart from Dr Garza to the Pharmacist.

## 2022-07-31 DIAGNOSIS — M06.9 RHEUMATOID ARTHRITIS FLARE: Primary | ICD-10-CM

## 2022-08-01 ENCOUNTER — TELEPHONE (OUTPATIENT)
Dept: RHEUMATOLOGY | Facility: CLINIC | Age: 67
End: 2022-08-01
Payer: MEDICARE

## 2022-08-01 NOTE — TELEPHONE ENCOUNTER
Left voicemail asking patient to call the office back to schedule labs  ----- Message -----  From: Korin Alcala MD  Sent: 7/31/2022  11:53 AM CDT  To: [facility] Staff    Please ask him to do labs in 2 weeks.    Dr. GIRON

## 2022-08-02 ENCOUNTER — CLINICAL SUPPORT (OUTPATIENT)
Dept: DIABETES | Facility: CLINIC | Age: 67
End: 2022-08-02
Payer: MEDICARE

## 2022-08-02 DIAGNOSIS — Z79.4 TYPE 2 DIABETES MELLITUS WITH HYPERGLYCEMIA, WITH LONG-TERM CURRENT USE OF INSULIN: Primary | ICD-10-CM

## 2022-08-02 DIAGNOSIS — E11.65 TYPE 2 DIABETES MELLITUS WITH HYPERGLYCEMIA, WITH LONG-TERM CURRENT USE OF INSULIN: Primary | ICD-10-CM

## 2022-08-02 PROCEDURE — G0108 PR DIAB MANAGE TRN  PER INDIV: ICD-10-PCS | Mod: 95,,, | Performed by: FAMILY MEDICINE

## 2022-08-02 PROCEDURE — G0108 DIAB MANAGE TRN  PER INDIV: HCPCS | Mod: 95,,, | Performed by: FAMILY MEDICINE

## 2022-08-03 ENCOUNTER — PATIENT MESSAGE (OUTPATIENT)
Dept: PODIATRY | Facility: CLINIC | Age: 67
End: 2022-08-03
Payer: MEDICARE

## 2022-08-03 ENCOUNTER — PATIENT MESSAGE (OUTPATIENT)
Dept: RHEUMATOLOGY | Facility: CLINIC | Age: 67
End: 2022-08-03
Payer: MEDICARE

## 2022-08-03 DIAGNOSIS — Z79.4 TYPE 2 DIABETES MELLITUS WITH OTHER SPECIFIED COMPLICATION, WITH LONG-TERM CURRENT USE OF INSULIN: Primary | ICD-10-CM

## 2022-08-03 DIAGNOSIS — E11.69 TYPE 2 DIABETES MELLITUS WITH OTHER SPECIFIED COMPLICATION, WITH LONG-TERM CURRENT USE OF INSULIN: Primary | ICD-10-CM
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
## 2022-08-04 ENCOUNTER — TELEPHONE (OUTPATIENT)
Dept: RHEUMATOLOGY | Facility: CLINIC | Age: 67
End: 2022-08-04
Payer: MEDICARE

## 2022-08-04 DIAGNOSIS — T14.8XXA BRUISING: Primary | ICD-10-CM

## 2022-08-10 ENCOUNTER — PATIENT MESSAGE (OUTPATIENT)
Dept: RHEUMATOLOGY | Facility: CLINIC | Age: 67
End: 2022-08-10
Payer: MEDICARE

## 2022-08-16 ENCOUNTER — LAB VISIT (OUTPATIENT)
Dept: LAB | Facility: OTHER | Age: 67
End: 2022-08-16
Attending: INTERNAL MEDICINE
Payer: MEDICARE

## 2022-08-16 DIAGNOSIS — T14.8XXA BRUISING: ICD-10-CM

## 2022-08-16 LAB
BASOPHILS # BLD AUTO: 0.05 K/UL (ref 0–0.2)
BASOPHILS NFR BLD: 0.5 % (ref 0–1.9)
DIFFERENTIAL METHOD: ABNORMAL
EOSINOPHIL # BLD AUTO: 0.1 K/UL (ref 0–0.5)
EOSINOPHIL NFR BLD: 0.5 % (ref 0–8)
ERYTHROCYTE [DISTWIDTH] IN BLOOD BY AUTOMATED COUNT: 12.4 % (ref 11.5–14.5)
HCT VFR BLD AUTO: 55.3 % (ref 40–54)
HGB BLD-MCNC: 18.8 G/DL (ref 14–18)
IMM GRANULOCYTES # BLD AUTO: 0.06 K/UL (ref 0–0.04)
IMM GRANULOCYTES NFR BLD AUTO: 0.6 % (ref 0–0.5)
INR PPP: 1 (ref 0.8–1.2)
LYMPHOCYTES # BLD AUTO: 1.1 K/UL (ref 1–4.8)
LYMPHOCYTES NFR BLD: 11.1 % (ref 18–48)
MCH RBC QN AUTO: 34.5 PG (ref 27–31)
MCHC RBC AUTO-ENTMCNC: 34 G/DL (ref 32–36)
MCV RBC AUTO: 102 FL (ref 82–98)
MONOCYTES # BLD AUTO: 0.3 K/UL (ref 0.3–1)
MONOCYTES NFR BLD: 3.5 % (ref 4–15)
NEUTROPHILS # BLD AUTO: 8 K/UL (ref 1.8–7.7)
NEUTROPHILS NFR BLD: 83.8 % (ref 38–73)
NRBC BLD-RTO: 0 /100 WBC
PLATELET # BLD AUTO: 173 K/UL (ref 150–450)
PMV BLD AUTO: 11.9 FL (ref 9.2–12.9)
PROTHROMBIN TIME: 10.7 SEC (ref 9–12.5)
RBC # BLD AUTO: 5.45 M/UL (ref 4.6–6.2)
WBC # BLD AUTO: 9.53 K/UL (ref 3.9–12.7)

## 2022-08-16 PROCEDURE — 36415 COLL VENOUS BLD VENIPUNCTURE: CPT | Performed by: INTERNAL MEDICINE

## 2022-08-16 PROCEDURE — 85610 PROTHROMBIN TIME: CPT | Performed by: INTERNAL MEDICINE

## 2022-08-16 PROCEDURE — 85025 COMPLETE CBC W/AUTO DIFF WBC: CPT | Performed by: INTERNAL MEDICINE

## 2022-08-20 ENCOUNTER — OFFICE VISIT (OUTPATIENT)
Dept: URGENT CARE | Facility: CLINIC | Age: 67
End: 2022-08-20
Payer: MEDICARE

## 2022-08-20 VITALS
BODY MASS INDEX: 34.49 KG/M2 | SYSTOLIC BLOOD PRESSURE: 115 MMHG | HEART RATE: 104 BPM | WEIGHT: 246.38 LBS | RESPIRATION RATE: 18 BRPM | DIASTOLIC BLOOD PRESSURE: 68 MMHG | OXYGEN SATURATION: 94 % | TEMPERATURE: 98 F | HEIGHT: 71 IN

## 2022-08-20 DIAGNOSIS — B35.6 TINEA CRURIS: ICD-10-CM

## 2022-08-20 DIAGNOSIS — L08.9 INFECTED EPIDERMOID CYST: Primary | ICD-10-CM

## 2022-08-20 DIAGNOSIS — L72.0 INFECTED EPIDERMOID CYST: Primary | ICD-10-CM

## 2022-08-20 PROCEDURE — 1126F PR PAIN SEVERITY QUANTIFIED, NO PAIN PRESENT: ICD-10-PCS | Mod: CPTII,S$GLB,, | Performed by: FAMILY MEDICINE

## 2022-08-20 PROCEDURE — 1159F MED LIST DOCD IN RCRD: CPT | Mod: CPTII,S$GLB,, | Performed by: FAMILY MEDICINE

## 2022-08-20 PROCEDURE — 4010F ACE/ARB THERAPY RXD/TAKEN: CPT | Mod: CPTII,S$GLB,, | Performed by: FAMILY MEDICINE

## 2022-08-20 PROCEDURE — 3051F PR MOST RECENT HEMOGLOBIN A1C LEVEL 7.0 - < 8.0%: ICD-10-PCS | Mod: CPTII,S$GLB,, | Performed by: FAMILY MEDICINE

## 2022-08-20 PROCEDURE — 3074F PR MOST RECENT SYSTOLIC BLOOD PRESSURE < 130 MM HG: ICD-10-PCS | Mod: CPTII,S$GLB,, | Performed by: FAMILY MEDICINE

## 2022-08-20 PROCEDURE — 3074F SYST BP LT 130 MM HG: CPT | Mod: CPTII,S$GLB,, | Performed by: FAMILY MEDICINE

## 2022-08-20 PROCEDURE — 1160F RVW MEDS BY RX/DR IN RCRD: CPT | Mod: CPTII,S$GLB,, | Performed by: FAMILY MEDICINE

## 2022-08-20 PROCEDURE — 99213 PR OFFICE/OUTPT VISIT, EST, LEVL III, 20-29 MIN: ICD-10-PCS | Mod: S$GLB,,, | Performed by: FAMILY MEDICINE

## 2022-08-20 PROCEDURE — 99213 OFFICE O/P EST LOW 20 MIN: CPT | Mod: S$GLB,,, | Performed by: FAMILY MEDICINE

## 2022-08-20 PROCEDURE — 3008F BODY MASS INDEX DOCD: CPT | Mod: CPTII,S$GLB,, | Performed by: FAMILY MEDICINE

## 2022-08-20 PROCEDURE — 3051F HG A1C>EQUAL 7.0%<8.0%: CPT | Mod: CPTII,S$GLB,, | Performed by: FAMILY MEDICINE

## 2022-08-20 PROCEDURE — 1159F PR MEDICATION LIST DOCUMENTED IN MEDICAL RECORD: ICD-10-PCS | Mod: CPTII,S$GLB,, | Performed by: FAMILY MEDICINE

## 2022-08-20 PROCEDURE — 1160F PR REVIEW ALL MEDS BY PRESCRIBER/CLIN PHARMACIST DOCUMENTED: ICD-10-PCS | Mod: CPTII,S$GLB,, | Performed by: FAMILY MEDICINE

## 2022-08-20 PROCEDURE — 3078F DIAST BP <80 MM HG: CPT | Mod: CPTII,S$GLB,, | Performed by: FAMILY MEDICINE

## 2022-08-20 PROCEDURE — 1126F AMNT PAIN NOTED NONE PRSNT: CPT | Mod: CPTII,S$GLB,, | Performed by: FAMILY MEDICINE

## 2022-08-20 PROCEDURE — 3078F PR MOST RECENT DIASTOLIC BLOOD PRESSURE < 80 MM HG: ICD-10-PCS | Mod: CPTII,S$GLB,, | Performed by: FAMILY MEDICINE

## 2022-08-20 PROCEDURE — 3008F PR BODY MASS INDEX (BMI) DOCUMENTED: ICD-10-PCS | Mod: CPTII,S$GLB,, | Performed by: FAMILY MEDICINE

## 2022-08-20 PROCEDURE — 4010F PR ACE/ARB THEARPY RXD/TAKEN: ICD-10-PCS | Mod: CPTII,S$GLB,, | Performed by: FAMILY MEDICINE

## 2022-08-20 RX ORDER — CLINDAMYCIN HYDROCHLORIDE 300 MG/1
300 CAPSULE ORAL 4 TIMES DAILY
Qty: 28 CAPSULE | Refills: 0 | Status: SHIPPED | OUTPATIENT
Start: 2022-08-20 | End: 2022-08-27

## 2022-08-20 RX ORDER — CLOTRIMAZOLE 1 %
CREAM (GRAM) TOPICAL 2 TIMES DAILY
Qty: 60 G | Refills: 0 | Status: SHIPPED | OUTPATIENT
Start: 2022-08-20 | End: 2023-01-22

## 2022-08-20 NOTE — PROGRESS NOTES
"Subjective:       Patient ID: Nithin Wagner is a 67 y.o. male.    Vitals:  height is 5' 11" (1.803 m) and weight is 111.8 kg (246 lb 6.4 oz). His temperature is 97.5 °F (36.4 °C). His blood pressure is 115/68 and his pulse is 104. His respiration is 18 and oxygen saturation is 94% (abnormal).     Chief Complaint: Cyst     Patient has had a cyst on right  Inner leg for the past 4 days. He states that it has already drained is much smaller in size now but need ABx so it should not get bigger. Denies fever, chills,weaknes. Pulse ox is found to be low at 94. Pt reports hx of mild chronic hypoxia from s/o R upper lobectomy.  Pt also reports mild jock itch.    Cyst  This is a new problem. The current episode started in the past 7 days. The problem has been gradually worsening. Nothing aggravates the symptoms. He has tried nothing for the symptoms.     ROS    Objective:      Physical Exam   Constitutional: He is oriented to person, place, and time. He appears well-developed. He is cooperative.  Non-toxic appearance. He does not appear ill. No distress.   HENT:   Head: Normocephalic and atraumatic.   Ears:   Right Ear: Hearing normal.   Left Ear: Hearing normal. impacted cerumen  Nose: Nose normal. No mucosal edema, rhinorrhea or nasal deformity. No epistaxis. Right sinus exhibits no maxillary sinus tenderness and no frontal sinus tenderness. Left sinus exhibits no maxillary sinus tenderness and no frontal sinus tenderness.   Mouth/Throat: Uvula is midline, oropharynx is clear and moist and mucous membranes are normal. No trismus in the jaw. Normal dentition. No uvula swelling. No posterior oropharyngeal erythema.   Eyes: Conjunctivae and lids are normal. Right eye exhibits no discharge. Left eye exhibits no discharge. No scleral icterus.   Neck: Trachea normal and phonation normal. Neck supple.   Cardiovascular: Normal rate, regular rhythm, normal heart sounds and normal pulses.   No murmur heard.  Pulmonary/Chest: Effort " normal and breath sounds normal. No stridor. No respiratory distress. He has no wheezes. He has no rhonchi. He has no rales.   Abdominal: Normal appearance and bowel sounds are normal. He exhibits no distension and no mass. Soft. There is no abdominal tenderness.   Musculoskeletal: Normal range of motion.         General: No deformity. Normal range of motion.   Neurological: He is alert and oriented to person, place, and time. He exhibits normal muscle tone. Coordination normal.   Skin: Skin is warm, dry, intact, not diaphoretic and not pale.         Comments: Left groin: About 1cm area of induration with erythema. No fluctuation. No significant tenderness. No discharge. No swollen LN.    Psychiatric: His speech is normal and behavior is normal. Judgment and thought content normal.   Nursing note and vitals reviewed.        Assessment:       1. Infected epidermoid cyst    2. Tinea cruris          Plan:     As the abscess/cyst has already drained and reduced in size, will treat it conservatively. Discussed diagnosis/plan with patient in clinic. Advised to f/u with PCP within 2-5 days. ER precautions given if symptoms get any worse. All questions answered. Patient verbally understood and agreed with treatment plan.    Infected epidermoid cyst    Tinea cruris    Other orders  -     clindamycin (CLEOCIN) 300 MG capsule; Take 1 capsule (300 mg total) by mouth 4 (four) times daily. for 7 days  Dispense: 28 capsule; Refill: 0  -     clotrimazole (LOTRIMIN) 1 % cream; Apply topically 2 (two) times daily.  Dispense: 60 g; Refill: 0

## 2022-08-22 ENCOUNTER — TELEPHONE (OUTPATIENT)
Dept: URGENT CARE | Facility: CLINIC | Age: 67
End: 2022-08-22
Payer: MEDICARE

## 2022-08-22 ENCOUNTER — PATIENT MESSAGE (OUTPATIENT)
Dept: DIABETES | Facility: CLINIC | Age: 67
End: 2022-08-22
Payer: MEDICARE

## 2022-08-22 NOTE — TELEPHONE ENCOUNTER
Returned patient's call to discuss his concern regarding elevated blood glucose levels and antibiotics; no answer, message with callback number provided. Need notification the steroids may be the cause of his elevated blood glucose levels and not the antibiotics

## 2022-08-22 NOTE — TELEPHONE ENCOUNTER
I contacted the patient for a courtesy call back and he stated that his blood sugar was going all over the place and wanted to know if he should be taking his insulin differently.

## 2022-08-25 NOTE — TELEPHONE ENCOUNTER
Returned patient call to discuss sick day planning and managing BG while taking antibiotics. Follow up visit already scheduled late September.

## 2022-08-29 ENCOUNTER — PATIENT MESSAGE (OUTPATIENT)
Dept: RHEUMATOLOGY | Facility: CLINIC | Age: 67
End: 2022-08-29
Payer: MEDICARE

## 2022-09-20 ENCOUNTER — PATIENT MESSAGE (OUTPATIENT)
Dept: DIABETES | Facility: CLINIC | Age: 67
End: 2022-09-20

## 2022-09-20 ENCOUNTER — CLINICAL SUPPORT (OUTPATIENT)
Dept: DIABETES | Facility: CLINIC | Age: 67
End: 2022-09-20
Payer: MEDICARE

## 2022-09-20 DIAGNOSIS — Z79.4 TYPE 2 DIABETES MELLITUS WITH OTHER SPECIFIED COMPLICATION, WITH LONG-TERM CURRENT USE OF INSULIN: ICD-10-CM

## 2022-09-20 DIAGNOSIS — E11.69 TYPE 2 DIABETES MELLITUS WITH OTHER SPECIFIED COMPLICATION, WITH LONG-TERM CURRENT USE OF INSULIN: ICD-10-CM

## 2022-09-20 PROCEDURE — G0108 DIAB MANAGE TRN  PER INDIV: HCPCS | Mod: 95,,, | Performed by: FAMILY MEDICINE

## 2022-09-20 PROCEDURE — G0108 PR DIAB MANAGE TRN  PER INDIV: ICD-10-PCS | Mod: 95,,, | Performed by: FAMILY MEDICINE
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
## 2022-09-27 ENCOUNTER — PATIENT MESSAGE (OUTPATIENT)
Dept: RHEUMATOLOGY | Facility: CLINIC | Age: 67
End: 2022-09-27
Payer: MEDICARE

## 2022-09-28 DIAGNOSIS — M06.9 RHEUMATOID ARTHRITIS FLARE: Primary | ICD-10-CM

## 2022-10-05 ENCOUNTER — PATIENT MESSAGE (OUTPATIENT)
Dept: DIABETES | Facility: CLINIC | Age: 67
End: 2022-10-05
Payer: MEDICARE

## 2022-10-06 DIAGNOSIS — M94.9 DISORDER OF CARTILAGE, UNSPECIFIED: ICD-10-CM

## 2022-10-06 DIAGNOSIS — M06.9 RHEUMATOID ARTHRITIS FLARE: Primary | ICD-10-CM

## 2022-10-06 DIAGNOSIS — M54.6 PAIN IN THORACIC SPINE: ICD-10-CM

## 2022-10-11 ENCOUNTER — HOSPITAL ENCOUNTER (OUTPATIENT)
Dept: RADIOLOGY | Facility: OTHER | Age: 67
Discharge: HOME | End: 2022-10-11
Attending: INTERNAL MEDICINE
Payer: MEDICARE

## 2022-10-11 DIAGNOSIS — M54.6 PAIN IN THORACIC SPINE: ICD-10-CM

## 2022-10-11 PROCEDURE — 72070 X-RAY EXAM THORAC SPINE 2VWS: CPT | Mod: 26,,, | Performed by: RADIOLOGY

## 2022-10-11 PROCEDURE — 72070 XR THORACIC SPINE AP LATERAL: ICD-10-PCS | Mod: 26,,, | Performed by: RADIOLOGY

## 2022-10-11 PROCEDURE — 72070 X-RAY EXAM THORAC SPINE 2VWS: CPT | Mod: TC,FY

## 2022-10-13 ENCOUNTER — PATIENT MESSAGE (OUTPATIENT)
Dept: CARDIOLOGY | Facility: CLINIC | Age: 67
End: 2022-10-13
Payer: MEDICARE

## 2022-10-13 DIAGNOSIS — I50.22 CHRONIC SYSTOLIC HEART FAILURE: Primary | ICD-10-CM

## 2022-10-14 ENCOUNTER — PATIENT MESSAGE (OUTPATIENT)
Dept: RHEUMATOLOGY | Facility: CLINIC | Age: 67
End: 2022-10-14
Payer: MEDICARE

## 2022-10-18 DIAGNOSIS — M54.6 THORACIC SPINE PAIN: Primary | ICD-10-CM

## 2022-10-18 DIAGNOSIS — R30.0 DYSURIA: ICD-10-CM

## 2022-10-18 DIAGNOSIS — M54.50 LUMBAR SPINE PAIN: ICD-10-CM

## 2022-10-19 ENCOUNTER — OFFICE VISIT (OUTPATIENT)
Dept: URGENT CARE | Facility: CLINIC | Age: 67
End: 2022-10-19
Payer: MEDICARE

## 2022-10-19 VITALS
RESPIRATION RATE: 16 BRPM | SYSTOLIC BLOOD PRESSURE: 155 MMHG | OXYGEN SATURATION: 92 % | DIASTOLIC BLOOD PRESSURE: 84 MMHG | TEMPERATURE: 99 F | HEART RATE: 98 BPM | BODY MASS INDEX: 35 KG/M2 | WEIGHT: 250 LBS | HEIGHT: 71 IN

## 2022-10-19 DIAGNOSIS — R14.0 NON-GASEOUS ABDOMINAL DISTENTION: ICD-10-CM

## 2022-10-19 DIAGNOSIS — R07.9 CHEST PAIN, UNSPECIFIED TYPE: ICD-10-CM

## 2022-10-19 DIAGNOSIS — R60.9 PITTING EDEMA: ICD-10-CM

## 2022-10-19 DIAGNOSIS — R10.9 ABDOMINAL PAIN, UNSPECIFIED ABDOMINAL LOCATION: Primary | ICD-10-CM

## 2022-10-19 PROCEDURE — 3079F DIAST BP 80-89 MM HG: CPT | Mod: CPTII,S$GLB,,

## 2022-10-19 PROCEDURE — 1159F MED LIST DOCD IN RCRD: CPT | Mod: CPTII,S$GLB,,

## 2022-10-19 PROCEDURE — 4010F PR ACE/ARB THEARPY RXD/TAKEN: ICD-10-PCS | Mod: CPTII,S$GLB,,

## 2022-10-19 PROCEDURE — 3077F PR MOST RECENT SYSTOLIC BLOOD PRESSURE >= 140 MM HG: ICD-10-PCS | Mod: CPTII,S$GLB,,

## 2022-10-19 PROCEDURE — 1160F RVW MEDS BY RX/DR IN RCRD: CPT | Mod: CPTII,S$GLB,,

## 2022-10-19 PROCEDURE — 93005 ELECTROCARDIOGRAM TRACING: CPT | Mod: S$GLB,,, | Performed by: FAMILY MEDICINE

## 2022-10-19 PROCEDURE — 4010F ACE/ARB THERAPY RXD/TAKEN: CPT | Mod: CPTII,S$GLB,,

## 2022-10-19 PROCEDURE — 99214 PR OFFICE/OUTPT VISIT, EST, LEVL IV, 30-39 MIN: ICD-10-PCS | Mod: S$GLB,,,

## 2022-10-19 PROCEDURE — 3051F PR MOST RECENT HEMOGLOBIN A1C LEVEL 7.0 - < 8.0%: ICD-10-PCS | Mod: CPTII,S$GLB,,

## 2022-10-19 PROCEDURE — 3077F SYST BP >= 140 MM HG: CPT | Mod: CPTII,S$GLB,,

## 2022-10-19 PROCEDURE — 3051F HG A1C>EQUAL 7.0%<8.0%: CPT | Mod: CPTII,S$GLB,,

## 2022-10-19 PROCEDURE — 1159F PR MEDICATION LIST DOCUMENTED IN MEDICAL RECORD: ICD-10-PCS | Mod: CPTII,S$GLB,,

## 2022-10-19 PROCEDURE — 93010 ELECTROCARDIOGRAM REPORT: CPT | Mod: S$GLB,,, | Performed by: INTERNAL MEDICINE

## 2022-10-19 PROCEDURE — 3079F PR MOST RECENT DIASTOLIC BLOOD PRESSURE 80-89 MM HG: ICD-10-PCS | Mod: CPTII,S$GLB,,

## 2022-10-19 PROCEDURE — 99214 OFFICE O/P EST MOD 30 MIN: CPT | Mod: S$GLB,,,

## 2022-10-19 PROCEDURE — 93010 EKG 12-LEAD: ICD-10-PCS | Mod: S$GLB,,, | Performed by: INTERNAL MEDICINE

## 2022-10-19 PROCEDURE — 93005 EKG 12-LEAD: ICD-10-PCS | Mod: S$GLB,,, | Performed by: FAMILY MEDICINE

## 2022-10-19 PROCEDURE — 1160F PR REVIEW ALL MEDS BY PRESCRIBER/CLIN PHARMACIST DOCUMENTED: ICD-10-PCS | Mod: CPTII,S$GLB,,

## 2022-10-19 NOTE — PROGRESS NOTES
"Subjective:       Patient ID: Nithin Wagner is a 67 y.o. male.    Vitals:  height is 5' 11" (1.803 m) and weight is 113.4 kg (250 lb). His temperature is 98.5 °F (36.9 °C). His blood pressure is 155/84 (abnormal) and his pulse is 98. His respiration is 16 and oxygen saturation is 92% (abnormal).     Chief Complaint: Abdominal Pain    66 yo male with pmhx of RA, DM2, HTN, HLD, MARI, systolic heart failure presents with complaints of severe abdominal pain, abdominal distention, constipation, for 3-4 days. Patient states he woke up with chest pain in the middle of the night. He states he used a 12 lead finger ekg showed SVT but improved after about 30 minutes. He has been taking miralax and milk of magnesia for constipation. He states about 8 days ago he was constipated and had a bowel movement after fleet enemas 1 week later. Last BM was 2 days ago. Abdominal pain is described as sharp in the RUQ radiating to the epigastrum and into the LLQ. Patient was recently started on 80mg of lasix instead of 40mg due to fluid overload.     Abdominal Pain  This is a new problem. The current episode started in the past 7 days. The problem occurs intermittently. The problem has been gradually worsening. The pain is located in the generalized abdominal region. Pain scale: on pain meds. The patient is experiencing no pain. Quality: stabbing. The abdominal pain radiates to the back (left pelvic). Associated symptoms include constipation. Pertinent negatives include no diarrhea, fever or hematochezia. The pain is aggravated by certain positions. Relieved by: heating pads, switching positions. Treatments tried: hydrocordone. The treatment provided moderate relief. Patient's medical history does not include kidney stones.     Constitution: Negative for chills, sweating, fatigue and fever.   Gastrointestinal:  Positive for abdominal pain, abdominal bloating and constipation. Negative for diarrhea, bright red blood in stool, rectal " bleeding and rectal pain.     Objective:      Physical Exam   Constitutional: He is oriented to person, place, and time. He appears well-developed. He is cooperative.  Non-toxic appearance. He does not appear ill. No distress.   HENT:   Head: Normocephalic and atraumatic.   Ears:   Right Ear: Hearing, tympanic membrane, external ear and ear canal normal.   Left Ear: Hearing, tympanic membrane, external ear and ear canal normal.   Nose: Nose normal. No mucosal edema, rhinorrhea or nasal deformity. No epistaxis. Right sinus exhibits no maxillary sinus tenderness and no frontal sinus tenderness. Left sinus exhibits no maxillary sinus tenderness and no frontal sinus tenderness.   Mouth/Throat: Uvula is midline, oropharynx is clear and moist and mucous membranes are normal. No trismus in the jaw. Normal dentition. No uvula swelling. No posterior oropharyngeal erythema.   Eyes: Conjunctivae, EOM and lids are normal. Pupils are equal, round, and reactive to light. Right eye exhibits no discharge. Left eye exhibits no discharge. No scleral icterus.   Neck: Trachea normal and phonation normal. Neck supple.   Cardiovascular: Normal rate, regular rhythm, normal heart sounds and normal pulses.   Pulmonary/Chest: Effort normal. No respiratory distress. He has rales (let lower lung field).   Abdominal: Normal appearance and bowel sounds are normal. He exhibits distension and fluid wave. He exhibits no mass. Soft. protuberant There is abdominal tenderness in the left lower quadrant. There is no rebound and no guarding.   Musculoskeletal: Normal range of motion.         General: No deformity. Normal range of motion.   Neurological: He is alert and oriented to person, place, and time. He exhibits normal muscle tone. Coordination normal.   Skin: Skin is warm, dry, intact, not diaphoretic and not pale.   Psychiatric: His speech is normal and behavior is normal. Judgment and thought content normal.   Nursing note and vitals reviewed.       EKG: normal sinus rhythm; left anterior fascicular block that is unchanged from 03/2021. Vent rate 96bpm.     Assessment:       1. Abdominal pain, unspecified abdominal location    2. Non-gaseous abdominal distention    3. Pitting edema    4. Chest pain, unspecified type          Plan:         Abdominal pain, unspecified abdominal location    Non-gaseous abdominal distention    Pitting edema    Chest pain, unspecified type         Medical Decision Making:   Initial Assessment:   68 yo male with pmhx of RA, DM2, HTN, HLD, MARI, systolic heart failure presents with complaints of severe abdominal pain, abdominal distention, constipation, for 3-4 days. Patient states he woke up with chest pain in the middle of the night. He states he used a 12 lead finger ekg showed SVT but improved after about 30 minutes. He has been taking miralax and milk of magnesia for constipation. He states about 8 days ago he was constipated and had a bowel movement after fleet enemas 1 week later. Last BM was 2 days ago. Abdominal pain is described as sharp in the RUQ radiating to the epigastrum and into the LLQ.   Differential Diagnosis:   CHF, ascites, constipation, SBO, ileus  Urgent Care Management:  Ekg showed normal sinus rhythm with left anterior fascicular block, unchanged from previous ekg.  No RT for CXR or abdominal xray in clinic. Patient has abdominal fluid wave and pitting edema with rales in the LLF. Recommended ER evaluation as we cannot rule out any cardiac event, CHF exacerbation. Patient will go home to pack a bag and go to the ER later.

## 2022-10-20 ENCOUNTER — HOSPITAL ENCOUNTER (EMERGENCY)
Facility: HOSPITAL | Age: 67
Discharge: HOME | End: 2022-10-20
Attending: EMERGENCY MEDICINE
Payer: MEDICARE

## 2022-10-20 VITALS
BODY MASS INDEX: 34.87 KG/M2 | OXYGEN SATURATION: 94 % | WEIGHT: 250 LBS | TEMPERATURE: 98 F | HEART RATE: 120 BPM | RESPIRATION RATE: 16 BRPM | DIASTOLIC BLOOD PRESSURE: 83 MMHG | SYSTOLIC BLOOD PRESSURE: 146 MMHG

## 2022-10-20 DIAGNOSIS — K59.00 CONSTIPATION, UNSPECIFIED CONSTIPATION TYPE: Primary | ICD-10-CM

## 2022-10-20 DIAGNOSIS — R07.9 CHEST PAIN: ICD-10-CM

## 2022-10-20 DIAGNOSIS — Z53.29 LEFT AGAINST MEDICAL ADVICE: ICD-10-CM

## 2022-10-20 DIAGNOSIS — J18.9 PNEUMONIA DUE TO INFECTIOUS ORGANISM, UNSPECIFIED LATERALITY, UNSPECIFIED PART OF LUNG: ICD-10-CM

## 2022-10-20 DIAGNOSIS — R10.9 ABDOMINAL PAIN, UNSPECIFIED ABDOMINAL LOCATION: ICD-10-CM

## 2022-10-20 DIAGNOSIS — I50.9 HEART FAILURE: ICD-10-CM

## 2022-10-20 DIAGNOSIS — R09.02 HYPOXIA: ICD-10-CM

## 2022-10-20 LAB
ALBUMIN SERPL BCP-MCNC: 4.1 G/DL (ref 3.5–5.2)
ALP SERPL-CCNC: 113 U/L (ref 55–135)
ALT SERPL W/O P-5'-P-CCNC: 47 U/L (ref 10–44)
ANION GAP SERPL CALC-SCNC: 11 MMOL/L (ref 8–16)
AST SERPL-CCNC: 28 U/L (ref 10–40)
BASOPHILS # BLD AUTO: ABNORMAL K/UL (ref 0–0.2)
BASOPHILS NFR BLD: 0 % (ref 0–1.9)
BILIRUB SERPL-MCNC: 0.9 MG/DL (ref 0.1–1)
BUN SERPL-MCNC: 21 MG/DL (ref 8–23)
BUN SERPL-MCNC: 27 MG/DL (ref 6–30)
CALCIUM SERPL-MCNC: 9.6 MG/DL (ref 8.7–10.5)
CHLORIDE SERPL-SCNC: 101 MMOL/L (ref 95–110)
CHLORIDE SERPL-SCNC: 97 MMOL/L (ref 95–110)
CO2 SERPL-SCNC: 29 MMOL/L (ref 23–29)
CREAT SERPL-MCNC: 1.1 MG/DL (ref 0.5–1.4)
CREAT SERPL-MCNC: 1.3 MG/DL (ref 0.5–1.4)
DACRYOCYTES BLD QL SMEAR: ABNORMAL
DIFFERENTIAL METHOD: ABNORMAL
DOHLE BOD BLD QL SMEAR: PRESENT
EOSINOPHIL # BLD AUTO: ABNORMAL K/UL (ref 0–0.5)
EOSINOPHIL NFR BLD: 0 % (ref 0–8)
ERYTHROCYTE [DISTWIDTH] IN BLOOD BY AUTOMATED COUNT: 12.8 % (ref 11.5–14.5)
EST. GFR  (NO RACE VARIABLE): 59.5 ML/MIN/1.73 M^2
GIANT PLATELETS BLD QL SMEAR: PRESENT
GLUCOSE SERPL-MCNC: 126 MG/DL (ref 70–110)
GLUCOSE SERPL-MCNC: 126 MG/DL (ref 70–110)
HCT VFR BLD AUTO: 54.3 % (ref 40–54)
HCT VFR BLD CALC: 56 %PCV (ref 36–54)
HGB BLD-MCNC: 18.9 G/DL (ref 14–18)
IMM GRANULOCYTES # BLD AUTO: ABNORMAL K/UL (ref 0–0.04)
IMM GRANULOCYTES NFR BLD AUTO: ABNORMAL % (ref 0–0.5)
INFLUENZA A, MOLECULAR: NEGATIVE
INFLUENZA B, MOLECULAR: NEGATIVE
LACTATE SERPL-SCNC: 1.9 MMOL/L (ref 0.5–2.2)
LIPASE SERPL-CCNC: 6 U/L (ref 4–60)
LYMPHOCYTES # BLD AUTO: ABNORMAL K/UL (ref 1–4.8)
LYMPHOCYTES NFR BLD: 23 % (ref 18–48)
MCH RBC QN AUTO: 34.1 PG (ref 27–31)
MCHC RBC AUTO-ENTMCNC: 34.8 G/DL (ref 32–36)
MCV RBC AUTO: 98 FL (ref 82–98)
METAMYELOCYTES NFR BLD MANUAL: 1 %
MONOCYTES # BLD AUTO: ABNORMAL K/UL (ref 0.3–1)
MONOCYTES NFR BLD: 8 % (ref 4–15)
NEUTROPHILS # BLD AUTO: ABNORMAL K/UL (ref 1.8–7.7)
NEUTROPHILS NFR BLD: 66 % (ref 38–73)
NEUTS BAND NFR BLD MANUAL: 2 %
NRBC BLD-RTO: 0 /100 WBC
OVALOCYTES BLD QL SMEAR: ABNORMAL
PLATELET # BLD AUTO: 116 K/UL (ref 150–450)
PLATELET BLD QL SMEAR: ABNORMAL
PMV BLD AUTO: 12.1 FL (ref 9.2–12.9)
POC IONIZED CALCIUM: 1.01 MMOL/L (ref 1.06–1.42)
POC TCO2 (MEASURED): 32 MMOL/L (ref 23–29)
POIKILOCYTOSIS BLD QL SMEAR: SLIGHT
POTASSIUM BLD-SCNC: 5.1 MMOL/L (ref 3.5–5.1)
POTASSIUM SERPL-SCNC: 4.5 MMOL/L (ref 3.5–5.1)
PROT SERPL-MCNC: 7.5 G/DL (ref 6–8.4)
RBC # BLD AUTO: 5.55 M/UL (ref 4.6–6.2)
SAMPLE: ABNORMAL
SARS-COV-2 RDRP RESP QL NAA+PROBE: NEGATIVE
SMUDGE CELLS BLD QL SMEAR: PRESENT
SODIUM BLD-SCNC: 138 MMOL/L (ref 136–145)
SODIUM SERPL-SCNC: 137 MMOL/L (ref 136–145)
SPECIMEN SOURCE: NORMAL
SPHEROCYTES BLD QL SMEAR: ABNORMAL
TOXIC GRANULES BLD QL SMEAR: PRESENT
TROPONIN I SERPL DL<=0.01 NG/ML-MCNC: 0.01 NG/ML (ref 0–0.03)
TROPONIN I SERPL DL<=0.01 NG/ML-MCNC: 0.02 NG/ML (ref 0–0.03)
WBC # BLD AUTO: 11.21 K/UL (ref 3.9–12.7)

## 2022-10-20 PROCEDURE — 84484 ASSAY OF TROPONIN QUANT: CPT | Mod: 91 | Performed by: EMERGENCY MEDICINE

## 2022-10-20 PROCEDURE — 80053 COMPREHEN METABOLIC PANEL: CPT | Performed by: EMERGENCY MEDICINE

## 2022-10-20 PROCEDURE — 99285 PR EMERGENCY DEPT VISIT,LEVEL V: ICD-10-PCS | Mod: CS,,, | Performed by: EMERGENCY MEDICINE

## 2022-10-20 PROCEDURE — 93005 ELECTROCARDIOGRAM TRACING: CPT

## 2022-10-20 PROCEDURE — 83690 ASSAY OF LIPASE: CPT | Performed by: EMERGENCY MEDICINE

## 2022-10-20 PROCEDURE — 99285 EMERGENCY DEPT VISIT HI MDM: CPT | Mod: 25

## 2022-10-20 PROCEDURE — 85027 COMPLETE CBC AUTOMATED: CPT | Performed by: EMERGENCY MEDICINE

## 2022-10-20 PROCEDURE — 87502 INFLUENZA DNA AMP PROBE: CPT | Performed by: EMERGENCY MEDICINE

## 2022-10-20 PROCEDURE — U0002 COVID-19 LAB TEST NON-CDC: HCPCS | Performed by: EMERGENCY MEDICINE

## 2022-10-20 PROCEDURE — 25000003 PHARM REV CODE 250: Performed by: EMERGENCY MEDICINE

## 2022-10-20 PROCEDURE — 99285 EMERGENCY DEPT VISIT HI MDM: CPT | Mod: CS,,, | Performed by: EMERGENCY MEDICINE

## 2022-10-20 PROCEDURE — 83605 ASSAY OF LACTIC ACID: CPT | Performed by: EMERGENCY MEDICINE

## 2022-10-20 PROCEDURE — 85007 BL SMEAR W/DIFF WBC COUNT: CPT | Performed by: EMERGENCY MEDICINE

## 2022-10-20 PROCEDURE — 63600175 PHARM REV CODE 636 W HCPCS: Performed by: EMERGENCY MEDICINE

## 2022-10-20 PROCEDURE — 93010 ELECTROCARDIOGRAM REPORT: CPT | Mod: ,,, | Performed by: INTERNAL MEDICINE

## 2022-10-20 PROCEDURE — 25500020 PHARM REV CODE 255: Performed by: EMERGENCY MEDICINE

## 2022-10-20 PROCEDURE — 84484 ASSAY OF TROPONIN QUANT: CPT

## 2022-10-20 PROCEDURE — 94761 N-INVAS EAR/PLS OXIMETRY MLT: CPT

## 2022-10-20 PROCEDURE — 96374 THER/PROPH/DIAG INJ IV PUSH: CPT | Mod: 59

## 2022-10-20 PROCEDURE — 93010 EKG 12-LEAD: ICD-10-PCS | Mod: ,,, | Performed by: INTERNAL MEDICINE

## 2022-10-20 RX ORDER — METHYLNALTREXONE BROMIDE 150 MG/1
3 TABLET ORAL DAILY
Qty: 90 TABLET | Refills: 0 | Status: SHIPPED | OUTPATIENT
Start: 2022-10-20 | End: 2022-11-02

## 2022-10-20 RX ORDER — AMOXICILLIN AND CLAVULANATE POTASSIUM 875; 125 MG/1; MG/1
1 TABLET, FILM COATED ORAL 2 TIMES DAILY
Qty: 20 TABLET | Refills: 0 | Status: SHIPPED | OUTPATIENT
Start: 2022-10-20 | End: 2022-10-30

## 2022-10-20 RX ORDER — PREDNISONE 20 MG/1
20 TABLET ORAL
Status: COMPLETED | OUTPATIENT
Start: 2022-10-20 | End: 2022-10-20

## 2022-10-20 RX ORDER — HYDROCODONE BITARTRATE AND ACETAMINOPHEN 10; 325 MG/1; MG/1
1 TABLET ORAL
Status: COMPLETED | OUTPATIENT
Start: 2022-10-20 | End: 2022-10-20

## 2022-10-20 RX ORDER — FUROSEMIDE 10 MG/ML
40 INJECTION INTRAMUSCULAR; INTRAVENOUS
Status: COMPLETED | OUTPATIENT
Start: 2022-10-20 | End: 2022-10-20

## 2022-10-20 RX ADMIN — IOHEXOL 100 ML: 350 INJECTION, SOLUTION INTRAVENOUS at 02:10

## 2022-10-20 RX ADMIN — PREDNISONE 20 MG: 20 TABLET ORAL at 03:10

## 2022-10-20 RX ADMIN — HYDROCODONE BITARTRATE AND ACETAMINOPHEN 1 TABLET: 10; 325 TABLET ORAL at 03:10

## 2022-10-20 RX ADMIN — FUROSEMIDE 40 MG: 10 INJECTION, SOLUTION INTRAMUSCULAR; INTRAVENOUS at 04:10

## 2022-10-20 NOTE — PROVIDER PROGRESS NOTES - EMERGENCY DEPT.
Encounter Date: 10/20/2022    ED Physician Progress Notes         EKG - STEMI Decision  Initial Reading: No STEMI present.

## 2022-10-20 NOTE — ED TRIAGE NOTES
"Nithin Wagner, a 67 y.o. male presents to the ED w/ complaint of chest pain and abdominal pain that began 10 days ago. Pt has been constipated for 10 days and felt a "pop" when straining to use the bathroom. A&Ox4, GCS 15    Triage note:  Chief Complaint   Patient presents with    Chest Pain     Pt c/o chest pain, abdominal pain, and increased SOB x2 days. Pt states he has an echo scheduled for the morning. PMH of CHF Pt is on 40 mg lasix at home.      Review of patient's allergies indicates:   Allergen Reactions    Enbrel [etanercept] Shortness Of Breath     CHF    Nsaids (non-steroidal anti-inflammatory drug) Other (See Comments)     hypertention  Other reaction(s): Other (See Comments)  hypertention  HTN    Statins-hmg-coa reductase inhibitors Other (See Comments)     Heart arrythemias  Other reaction(s): Other (See Comments)  Heart arrythemias  Joint pain and cardiac arrythmias    Pcn [penicillins] Rash     Past Medical History:   Diagnosis Date    Arthritis     Atrial myxoma     Coronary atherosclerosis     Diabetes mellitus, type 2     Difficult intubation     Hepatitis B     Hyperlipidemia     Hypertension     Non-alcoholic fatty liver disease     Rheumatoid arthritis     Rheumatoid arthritis flare 07/12/2021    Stroke     TIA    Systolic heart failure        "

## 2022-10-20 NOTE — DISCHARGE INSTRUCTIONS
Take the prescribed antibiotic as directed.     Tests today showed:   Labs Reviewed   CBC W/ AUTO DIFFERENTIAL - Abnormal; Notable for the following components:       Result Value    Hemoglobin 18.9 (*)     Hematocrit 54.3 (*)     MCH 34.1 (*)     Platelets 116 (*)     Platelet Estimate Decreased (*)     All other components within normal limits   COMPREHENSIVE METABOLIC PANEL - Abnormal; Notable for the following components:    Glucose 126 (*)     ALT 47 (*)     eGFR 59.5 (*)     All other components within normal limits   ISTAT PROCEDURE - Abnormal; Notable for the following components:    POC Glucose 126 (*)     POC TCO2 (MEASURED) 32 (*)     POC Ionized Calcium 1.01 (*)     POC Hematocrit 56 (*)     All other components within normal limits   INFLUENZA A & B BY MOLECULAR   TROPONIN I   LACTIC ACID, PLASMA   LIPASE   LIPASE   TROPONIN I   SARS-COV-2 RNA AMPLIFICATION, QUAL     CT Abdomen Pelvis With Contrast   Final Result      Diffuse dilatation of stool filled large bowel without evidence of transition point, likely constipation.  Correlate clinically.      Bilateral nonobstructive nephrolithiasis.      Partially visual right middle lobe patchy ground-glass opacity, recommend correlation with underlying infection, inflammation or aspiration.      Additional findings as above.      Electronically signed by resident: Evan Bhatt   Date:    10/20/2022   Time:    02:59      Electronically signed by: Jj Ortez MD   Date:    10/20/2022   Time:    03:47      X-Ray Chest 1 View   Final Result      Chronic opacities in the lungs, right more than left, similar to prior.      No significant change from prior study.         Electronically signed by: Jj Ortez MD   Date:    10/20/2022   Time:    03:04          Treatments you had today:   Medications   methylnaltrexone 12 mg/0.6 mL subcutaneous injection 12 mg (12 mg Subcutaneous Not Given 10/20/22 0400)   iohexoL (OMNIPAQUE 350) injection 100 mL (100 mLs  Intravenous Given 10/20/22 0212)   HYDROcodone-acetaminophen  mg per tablet 1 tablet (1 tablet Oral Given 10/20/22 0334)   predniSONE tablet 20 mg (20 mg Oral Given 10/20/22 0334)   furosemide injection 40 mg (40 mg Intravenous Given 10/20/22 0402)       Follow-Up Plan:  - Follow-up with primary care doctor within 3 - 5 days  - Additional testing and/or evaluation as directed by your primary doctor    Return to the Emergency Department for symptoms including but not limited to: worsening symptoms, shortness of breath or chest pain, vomiting with inability to hold down fluids, fevers greater than 100.4°F, passing out/fainting/unconsciousness, or other concerning symptoms.

## 2022-10-20 NOTE — ED NOTES
I-STAT Chem-8+ Results:   Value Reference Range   Sodium 138 136-145 mmol/L   Potassium  5.1 3.5-5.1 mmol/L   Chloride 101  mmol/L   Ionized Calcium 1.01 1.06-1.42 mmol/L   CO2 (measured) 32 23-29 mmol/L   Glucose 126  mg/dL   BUN 27 6-30 mg/dL   Creatinine 1.1 0.5-1.4 mg/dL   Hematocrit 56 36-54%

## 2022-10-21 ENCOUNTER — LAB VISIT (OUTPATIENT)
Dept: LAB | Facility: OTHER | Age: 67
End: 2022-10-21
Attending: INTERNAL MEDICINE
Payer: MEDICARE

## 2022-10-21 DIAGNOSIS — R30.0 DYSURIA: ICD-10-CM

## 2022-10-21 DIAGNOSIS — M54.6 THORACIC SPINE PAIN: ICD-10-CM

## 2022-10-24 ENCOUNTER — PATIENT MESSAGE (OUTPATIENT)
Dept: DIABETES | Facility: CLINIC | Age: 67
End: 2022-10-24
Payer: MEDICARE

## 2022-10-24 ENCOUNTER — PATIENT MESSAGE (OUTPATIENT)
Dept: RHEUMATOLOGY | Facility: CLINIC | Age: 67
End: 2022-10-24

## 2022-10-24 ENCOUNTER — OFFICE VISIT (OUTPATIENT)
Dept: RHEUMATOLOGY | Facility: CLINIC | Age: 67
End: 2022-10-24
Payer: MEDICARE

## 2022-10-24 DIAGNOSIS — M06.9 RHEUMATOID ARTHRITIS INVOLVING MULTIPLE JOINTS: ICD-10-CM

## 2022-10-24 DIAGNOSIS — D45 POLYCYTHEMIA VERA: Primary | ICD-10-CM

## 2022-10-24 PROCEDURE — 4010F PR ACE/ARB THEARPY RXD/TAKEN: ICD-10-PCS | Mod: CPTII,95,, | Performed by: INTERNAL MEDICINE

## 2022-10-24 PROCEDURE — 99214 PR OFFICE/OUTPT VISIT, EST, LEVL IV, 30-39 MIN: ICD-10-PCS | Mod: 95,,, | Performed by: INTERNAL MEDICINE

## 2022-10-24 PROCEDURE — 99499 UNLISTED E&M SERVICE: CPT | Mod: 95,,, | Performed by: INTERNAL MEDICINE

## 2022-10-24 PROCEDURE — 3051F PR MOST RECENT HEMOGLOBIN A1C LEVEL 7.0 - < 8.0%: ICD-10-PCS | Mod: CPTII,95,, | Performed by: INTERNAL MEDICINE

## 2022-10-24 PROCEDURE — 3051F HG A1C>EQUAL 7.0%<8.0%: CPT | Mod: CPTII,95,, | Performed by: INTERNAL MEDICINE

## 2022-10-24 PROCEDURE — 99499 RISK ADDL DX/OHS AUDIT: ICD-10-PCS | Mod: 95,,, | Performed by: INTERNAL MEDICINE

## 2022-10-24 PROCEDURE — 99214 OFFICE O/P EST MOD 30 MIN: CPT | Mod: 95,,, | Performed by: INTERNAL MEDICINE

## 2022-10-24 PROCEDURE — 4010F ACE/ARB THERAPY RXD/TAKEN: CPT | Mod: CPTII,95,, | Performed by: INTERNAL MEDICINE

## 2022-10-24 NOTE — PROGRESS NOTES
Subjective:       Patient ID: Nithin Wagner is a 64 y.o. male.    Chief Complaint: No chief complaint on file.    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   He was 53 when he was diagnosed with RA. He reports he reports he was having pain and swelling in knees and ankles. He then he had involvement in hands and shoulders. He was put on MTX.  He reports that the methotrexate did help with joints but it caused confusion so he stopped it for 2 years. He was taken aleve which helped his pain for 2 years.Then he developed HTN on NSAIDS so he stopped them.  He was then put on hydrocodone 5 QID to control his pain. Reports that warm weather improves his pain. He is back on methotrexate. Today, it will be his 5th dose. He is taking folic acid 1 mg po qday.  He has never tried up to 8 pills once a week. He is taking prednisone 10mg po BID for 4 weeks. He still has some pain in ankles and knees.  He does get mild swelling in knees and ankles. He was previously on prednisone 40mg a day. He smokes 1 pack per day for 40 years    family hx:cousin: connective tissue disease    Interval history: he has tried kevzara in 16 weeks.   He has pain in lower abdomen and in bilateral alicia region.  He reports he was diagnosed with CHF and pneumonia. He is currently on z pack and is taking increased dose of lasix.   He noticed symptoms after increasing trulicity.  He is on prednisone 32.5 mg a day.   He is fatigued.   He reports that his pain is now 6/10. He has pain in right knee and right shoulder. He gets episodes of swelling in wrists and hands.   He is getting swelling of both knees, worse on right.He reports he has a lumbar compression deformity from prior injury.   Denies PUD or diverticulitis.          Past Medical History:   Diagnosis Date    Arthritis     Diabetes mellitus      Hyperlipidemia     Hypertension        Review of Systems      Review of Systems   Constitutional: Negative for fever.   Eyes: Negative for redness.   Respiratory: Negative for cough, hemoptysis, sputum production and shortness of breath.    Cardiovascular: Negative for chest pain.   Gastrointestinal: Negative for constipation and diarrhea.   Musculoskeletal: Positive for joint pain.   Skin: Negative for rash.   Neurological: Negative for headaches.   Endo/Heme/Allergies: Does not bruise/bleed easily.         Objective:   There were no vitals taken for this visit.     Physical Exam   Constitutional: He is oriented to person, place, and time and well-developed, well-nourished, and in no distress. No distress.   HENT:   Head: Normocephalic and atraumatic.   Right Ear: External ear normal.   Eyes: Conjunctivae and EOM are normal. Pupils are equal, round, and reactive to light. Right eye exhibits no discharge. Left eye exhibits no discharge. No scleral icterus.   Neck: Normal range of motion. Neck supple. No JVD present. No tracheal deviation present. No thyromegaly present.   Cardiovascular: Normal rate and regular rhythm.    Pulmonary/Chest: Effort normal and breath sounds normal. No stridor. No respiratory distress. He has no wheezes. He has no rales. He exhibits no tenderness.   Abdominal: Soft. Bowel sounds are normal. He exhibits no distension and no mass. There is no tenderness. There is no rebound and no guarding.   Lymphadenopathy:     He has no cervical adenopathy.   Neurological: He is alert and oriented to person, place, and time. He displays normal reflexes. No cranial nerve deficit. He exhibits normal muscle tone. Coordination normal.   Skin: Skin is warm and dry. No rash noted. He is not diaphoretic. No erythema. No pallor.     Musculoskeletal: He exhibits edema and tenderness. He exhibits no deformity.      labs: reviewed  Results for CECILIA MEAD (MRN [id number]) as of 4/14/2020 12:44   Ref. Range  4/7/2020 13:52   CCP Antibodies Latest Ref Range: <5.0 U/mL 336.9 (H)   Rheumatoid Factor Latest Ref Range: 0.0 - 15.0 IU/mL 456.0 (H)       No data to display     Assessment:     67 year old with PMH of  Type II DM, HTN, ?gout, right atrial myxoma s/p surgery in 2018,  CAD s/p 3 stents, PE, right upper lobectomy secondary to right atrial myxoma, latent TB s/p INH x6 in 1982,Polycythemia, MARI  most likely secondary to tobacco use and pulmonary insufficiency  here  For follow up of  rheumatoid arthritis. He was requiring high doses of steroid to function when we initially met.    Given that he has +hep B core antibody, he was started on lamivudine by hepatology but he had to stop due to GI upset.  I took him off  methotrexate give his baseline thrombocytopenia, mild LFT elevation, and prior lung surgery.  He was doing good on enbrel but developed CHF.      He is not able to function with less than prednisone 17.5mg a day. I TOLD HIM  I am concerned about long term side effects of steroids.  I was going to switch him to orencia infusions but insurance company did not pay for it.  He is on  kevzara but has not been able to wean off steroids.  I told him I would like to try to wean down and then have him come back to examine.     I ordered lumbar xray and MRI thoracic spine but he prefers to hold off on it right now.    Plan:       Problem List Items Addressed This Visit       None        -continue  kevzara injections (Risks of TNF inhibitor discussed with patient and not limited to cell count abnormalities, malignancy,  GI perforation,allergic  reaction to medication, and increased risk of infection. Patient agrees with starting medication.)  -wean prednisone from 32. 5 mg a day to 30mg a day for one week then 25 mg a day for one week and then 20mg ad ay for one week and then come back so I can examine him.  I told him I am concerned he may be treating fibromyalgia with the steroids.  No MTX given MARI and baseline  thrombocytopenia, poor lung reserve  Continue folic acid 1 mg po qday   -continue  lamivudine for Hep B core antibody  Follow up with GI  Continue bactrim MWF       No anti-tnf given CHF  No rosales kinase given history of PE          The patient location is: home  The chief complaint leading to consultation is: joint pain    Visit type: audiovisual    Face to Face time with patient: 30   minutes of total time spent on the encounter, which includes face to face time and non-face to face time preparing to see the patient (eg, review of tests), Obtaining and/or reviewing separately obtained history, Documenting clinical information in the electronic or other health record, Independently interpreting results (not separately reported) and communicating results to the patient/family/caregiver, or Care coordination (not separately reported).         Each patient to whom he or she provides medical services by telemedicine is:  (1) informed of the relationship between the physician and patient and the respective role of any other health care provider with respect to management of the patient; and (2) notified that he or she may decline to receive medical services by telemedicine and may withdraw from such care at any time.    Notes:

## 2022-10-24 NOTE — PROGRESS NOTES
Rapid3 Question Responses and Scores 10/24/2022   MDHAQ Score 1.6   Psychologic Score 5.5   Pain Score 9   When you awakened in the morning OVER THE LAST WEEK, did you feel stiff? Yes   If Yes, please indicate the number of hours until you are as limber as you will be for the day 2   Fatigue Score 10   Global Health Score 7   RAPID3 Score 7.11     Answers submitted by the patient for this visit:  Rheumatology Questionnaire (Submitted on 10/24/2022)  fever: No  eye redness: No  mouth sores: Yes  headaches: Yes  shortness of breath: Yes  chest pain: Yes  trouble swallowing: No  diarrhea: No  constipation: Yes  unexpected weight change: No  genital sore: No  During the last 3 days, have you had a skin rash?: No  Bruises or bleeds easily: Yes  cough: Yes

## 2022-10-26 ENCOUNTER — PATIENT MESSAGE (OUTPATIENT)
Dept: CARDIOLOGY | Facility: CLINIC | Age: 67
End: 2022-10-26

## 2022-10-26 ENCOUNTER — HOSPITAL ENCOUNTER (EMERGENCY)
Facility: OTHER | Age: 67
Discharge: HOME | End: 2022-10-27
Payer: MEDICARE

## 2022-10-26 DIAGNOSIS — I47.10 PAROXYSMAL SVT (SUPRAVENTRICULAR TACHYCARDIA): ICD-10-CM

## 2022-10-26 DIAGNOSIS — R79.89 ELEVATED TROPONIN: ICD-10-CM

## 2022-10-26 DIAGNOSIS — R00.2 PALPITATIONS: Primary | ICD-10-CM

## 2022-10-26 DIAGNOSIS — R05.9 COUGH: ICD-10-CM

## 2022-10-26 LAB
ALBUMIN SERPL BCP-MCNC: 3.5 G/DL (ref 3.5–5.2)
ALP SERPL-CCNC: 87 U/L (ref 55–135)
ALT SERPL W/O P-5'-P-CCNC: 52 U/L (ref 10–44)
ANION GAP SERPL CALC-SCNC: 12 MMOL/L (ref 8–16)
APTT BLDCRRT: 22.6 SEC (ref 21–32)
AST SERPL-CCNC: 27 U/L (ref 10–40)
BASOPHILS # BLD AUTO: 0.06 K/UL (ref 0–0.2)
BASOPHILS NFR BLD: 0.6 % (ref 0–1.9)
BILIRUB SERPL-MCNC: 0.3 MG/DL (ref 0.1–1)
BNP SERPL-MCNC: 79 PG/ML (ref 0–99)
BUN SERPL-MCNC: 21 MG/DL (ref 8–23)
CALCIUM SERPL-MCNC: 9.4 MG/DL (ref 8.7–10.5)
CHLORIDE SERPL-SCNC: 99 MMOL/L (ref 95–110)
CO2 SERPL-SCNC: 24 MMOL/L (ref 23–29)
CREAT SERPL-MCNC: 1.3 MG/DL (ref 0.5–1.4)
CTP QC/QA: YES
CTP QC/QA: YES
D DIMER PPP IA.FEU-MCNC: 0.4 MG/L FEU
DIFFERENTIAL METHOD: ABNORMAL
EOSINOPHIL # BLD AUTO: 0.1 K/UL (ref 0–0.5)
EOSINOPHIL NFR BLD: 0.6 % (ref 0–8)
ERYTHROCYTE [DISTWIDTH] IN BLOOD BY AUTOMATED COUNT: 12.8 % (ref 11.5–14.5)
EST. GFR  (NO RACE VARIABLE): 59 ML/MIN/1.73 M^2
GLUCOSE SERPL-MCNC: 193 MG/DL (ref 70–110)
HCT VFR BLD AUTO: 54.6 % (ref 40–54)
HGB BLD-MCNC: 19.7 G/DL (ref 14–18)
IMM GRANULOCYTES # BLD AUTO: 0.15 K/UL (ref 0–0.04)
IMM GRANULOCYTES NFR BLD AUTO: 1.5 % (ref 0–0.5)
INR PPP: 1 (ref 0.8–1.2)
LYMPHOCYTES # BLD AUTO: 2.1 K/UL (ref 1–4.8)
LYMPHOCYTES NFR BLD: 21 % (ref 18–48)
MAGNESIUM SERPL-MCNC: 2 MG/DL (ref 1.6–2.6)
MCH RBC QN AUTO: 35.4 PG (ref 27–31)
MCHC RBC AUTO-ENTMCNC: 36.1 G/DL (ref 32–36)
MCV RBC AUTO: 98 FL (ref 82–98)
MONOCYTES # BLD AUTO: 0.8 K/UL (ref 0.3–1)
MONOCYTES NFR BLD: 8.1 % (ref 4–15)
NEUTROPHILS # BLD AUTO: 6.9 K/UL (ref 1.8–7.7)
NEUTROPHILS NFR BLD: 68.2 % (ref 38–73)
NRBC BLD-RTO: 0 /100 WBC
PLATELET # BLD AUTO: 195 K/UL (ref 150–450)
PMV BLD AUTO: 11.2 FL (ref 9.2–12.9)
POC MOLECULAR INFLUENZA A AGN: NEGATIVE
POC MOLECULAR INFLUENZA B AGN: NEGATIVE
POTASSIUM SERPL-SCNC: 4.3 MMOL/L (ref 3.5–5.1)
PROT SERPL-MCNC: 6.7 G/DL (ref 6–8.4)
PROTHROMBIN TIME: 10.8 SEC (ref 9–12.5)
RBC # BLD AUTO: 5.56 M/UL (ref 4.6–6.2)
SARS-COV-2 RDRP RESP QL NAA+PROBE: NEGATIVE
SODIUM SERPL-SCNC: 135 MMOL/L (ref 136–145)
T4 FREE SERPL-MCNC: 1.03 NG/DL (ref 0.71–1.51)
TROPONIN I SERPL DL<=0.01 NG/ML-MCNC: 0.04 NG/ML (ref 0–0.03)
TSH SERPL DL<=0.005 MIU/L-ACNC: 4.6 UIU/ML (ref 0.4–4)
WBC # BLD AUTO: 10.12 K/UL (ref 3.9–12.7)

## 2022-10-26 PROCEDURE — 80053 COMPREHEN METABOLIC PANEL: CPT

## 2022-10-26 PROCEDURE — 93010 ELECTROCARDIOGRAM REPORT: CPT | Mod: ,,, | Performed by: INTERNAL MEDICINE

## 2022-10-26 PROCEDURE — 85730 THROMBOPLASTIN TIME PARTIAL: CPT

## 2022-10-26 PROCEDURE — 83605 ASSAY OF LACTIC ACID: CPT

## 2022-10-26 PROCEDURE — 83735 ASSAY OF MAGNESIUM: CPT

## 2022-10-26 PROCEDURE — 99285 EMERGENCY DEPT VISIT HI MDM: CPT | Mod: 25

## 2022-10-26 PROCEDURE — 84439 ASSAY OF FREE THYROXINE: CPT

## 2022-10-26 PROCEDURE — 93005 ELECTROCARDIOGRAM TRACING: CPT

## 2022-10-26 PROCEDURE — 85610 PROTHROMBIN TIME: CPT

## 2022-10-26 PROCEDURE — 85025 COMPLETE CBC W/AUTO DIFF WBC: CPT

## 2022-10-26 PROCEDURE — 93010 EKG 12-LEAD: ICD-10-PCS | Mod: ,,, | Performed by: INTERNAL MEDICINE

## 2022-10-26 PROCEDURE — 84484 ASSAY OF TROPONIN QUANT: CPT

## 2022-10-26 PROCEDURE — 85379 FIBRIN DEGRADATION QUANT: CPT

## 2022-10-26 PROCEDURE — 87635 SARS-COV-2 COVID-19 AMP PRB: CPT

## 2022-10-26 PROCEDURE — 83880 ASSAY OF NATRIURETIC PEPTIDE: CPT

## 2022-10-26 PROCEDURE — 84443 ASSAY THYROID STIM HORMONE: CPT

## 2022-10-27 ENCOUNTER — PATIENT MESSAGE (OUTPATIENT)
Dept: CARDIOLOGY | Facility: CLINIC | Age: 67
End: 2022-10-27
Payer: MEDICARE

## 2022-10-27 VITALS
WEIGHT: 250 LBS | BODY MASS INDEX: 35 KG/M2 | HEART RATE: 112 BPM | OXYGEN SATURATION: 94 % | SYSTOLIC BLOOD PRESSURE: 133 MMHG | RESPIRATION RATE: 18 BRPM | DIASTOLIC BLOOD PRESSURE: 85 MMHG | TEMPERATURE: 99 F | HEIGHT: 71 IN

## 2022-10-27 LAB — LACTATE SERPL-SCNC: 1.6 MMOL/L (ref 0.5–2.2)

## 2022-10-27 PROCEDURE — 25000003 PHARM REV CODE 250

## 2022-10-27 RX ORDER — NAPROXEN SODIUM 220 MG/1
162 TABLET, FILM COATED ORAL
Status: COMPLETED | OUTPATIENT
Start: 2022-10-27 | End: 2022-10-27

## 2022-10-27 RX ADMIN — ASPIRIN 81 MG CHEWABLE TABLET 162 MG: 81 TABLET CHEWABLE at 12:10

## 2022-10-27 NOTE — ED TRIAGE NOTES
Sts since 5pm this afternoon, intermittent episodes of palpitations - became diaphoretic with the last episode. Denies known dysrhythmia hx. -110 upon arrival to ED room, denies CP, some dizziness with episodes

## 2022-10-27 NOTE — ED PROVIDER NOTES
Encounter Date: 10/26/2022    SCRIBE #1 NOTE: I, Katytirara Lyman, am scribing for, and in the presence of,  Alf De La Cruz MD.     History     Chief Complaint   Patient presents with    Shortness of Breath     With tachycardia since 5 pm, diagnosed with pneumonia on Saturday     Time seen by provider: 10:37 PM    Patient presenting to ED for evaluation of palpitations and tachycardia starting earlier this afternoon.  Patient says he had been seen in ED on [facility] last week for issues related to constipation, was diagnosed with possible pneumonia at that time and started on antibiotics which he has been taking since.  Patient says his constipation has improved significantly since then any also feels like his suspected pneumonia has been improving with only minimal coughing at this point.  Starting around 5:00 p.m. this evening, patient began to feel like his heart was racing, worked himself up to a cardiac monitor he had at home and found his heart rate would intermittently go as high as the 180s before coming back to the low 100s.  He reports that during these episodes of increased tachycardia, he did feel lightheaded and near syncopal along with occasional diaphoresis.  Upon arrival in ED, patient feels like his symptoms have improved.  Denies any associated chest pain or significant shortness of breath with these episodes.  No other specific complaints at this time.    The history is provided by the patient.   Review of patient's allergies indicates:   Allergen Reactions    Enbrel [etanercept] Shortness Of Breath     CHF    Nsaids (non-steroidal anti-inflammatory drug) Other (See Comments)     hypertention  Other reaction(s): Other (See Comments)  hypertention  HTN    Statins-hmg-coa reductase inhibitors Other (See Comments)     Heart arrythemias  Other reaction(s): Other (See Comments)  Heart arrythemias  Joint pain and cardiac arrythmias    Pcn [penicillins] Rash     Past Medical History:   Diagnosis Date     Arthritis     Atrial myxoma     Coronary atherosclerosis     Diabetes mellitus, type 2     Difficult intubation     Hepatitis B     Hyperlipidemia     Hypertension     Non-alcoholic fatty liver disease     Rheumatoid arthritis     Rheumatoid arthritis flare 07/12/2021    Stroke     TIA    Systolic heart failure      Past Surgical History:   Procedure Laterality Date    CORONARY ANGIOGRAPHY N/A 3/10/2021    Procedure: ANGIOGRAM, CORONARY ARTERY - right radial;  Surgeon: Shemar Dempsey MD;  Location: [facility] CATH LAB;  Service: Cardiology;  Laterality: N/A;    CORONARY STENT PLACEMENT  03/10/2021    prox-mid RCA [facility] 4.5 x 26 mm, 4.5 x 12 mm    LUNG LOBECTOMY Right 2008    RUL lobectomy after removal of atrial myxoma    PLEURA BIOPSY      RESECTION OF ATRIAL MYXOMA  2007     Family History   Problem Relation Age of Onset    Hodgkin's lymphoma Mother     Diabetes type II Mother     Kidney failure Father     Diabetes type I Father     Cancer Sister      Social History     Tobacco Use    Smoking status: Every Day     Packs/day: 1.00     Years: 35.00     Pack years: 35.00     Types: Cigarettes    Smokeless tobacco: Never   Substance Use Topics    Alcohol use: Not Currently    Drug use: No     Review of Systems   Constitutional:  Positive for diaphoresis. Negative for chills and fever.   HENT:  Negative for congestion, rhinorrhea and sore throat.    Eyes:  Negative for photophobia and visual disturbance.   Respiratory:  Positive for cough (Productive). Negative for shortness of breath.    Cardiovascular:  Positive for palpitations. Negative for chest pain and leg swelling.        Tachycardia.   Gastrointestinal:  Negative for abdominal pain, nausea and vomiting.   Genitourinary:  Negative for dysuria, frequency, hematuria and urgency.   Musculoskeletal:  Negative for arthralgias, neck pain and neck stiffness.   Skin:  Negative for rash.   Neurological:  Positive for light-headedness. Negative for weakness, numbness and  headaches.   Hematological:  Does not bruise/bleed easily.   Psychiatric/Behavioral:  The patient is not nervous/anxious.      Physical Exam     Initial Vitals [10/26/22 2210]   BP Pulse Resp Temp SpO2   118/76 110 20 98.9 °F (37.2 °C) (!) 94 %      MAP       --         Physical Exam    Nursing note and vitals reviewed.  Constitutional: He appears well-developed and well-nourished. He is not diaphoretic. No distress.   HENT:   Head: Normocephalic.   Mouth/Throat: Oropharynx is clear and moist.   Eyes: EOM are normal. Pupils are equal, round, and reactive to light.   Neck:   Normal range of motion.  Cardiovascular:  Regular rhythm, normal heart sounds and intact distal pulses.           Mild tachycardia.   Pulmonary/Chest: Effort normal and breath sounds normal. No respiratory distress. He has no wheezes. He has no rhonchi. He has no rales.   Lungs clear.   Abdominal: Abdomen is soft. Bowel sounds are normal. There is no abdominal tenderness. There is no rebound and no guarding.   Musculoskeletal:         General: Edema present. No tenderness. Normal range of motion.      Cervical back: Normal range of motion.      Right lower le+ Pitting Edema present.      Left lower le+ Pitting Edema present.     Neurological: He is alert and oriented to person, place, and time. He has normal strength. GCS score is 15. GCS eye subscore is 4. GCS verbal subscore is 5. GCS motor subscore is 6.   Skin: Skin is warm, dry and intact.   Psychiatric: He has a normal mood and affect.       ED Course   Procedures  Labs Reviewed   CBC W/ AUTO DIFFERENTIAL - Abnormal; Notable for the following components:       Result Value    Hemoglobin 19.7 (*)     Hematocrit 54.6 (*)     MCH 35.4 (*)     MCHC 36.1 (*)     Immature Granulocytes 1.5 (*)     Immature Grans (Abs) 0.15 (*)     All other components within normal limits   COMPREHENSIVE METABOLIC PANEL - Abnormal; Notable for the following components:    Sodium 135 (*)     Glucose 193 (*)      ALT 52 (*)     eGFR 59 (*)     All other components within normal limits   TROPONIN I - Abnormal; Notable for the following components:    Troponin I 0.035 (*)     All other components within normal limits   TSH - Abnormal; Notable for the following components:    TSH 4.602 (*)     All other components within normal limits   B-TYPE NATRIURETIC PEPTIDE   D DIMER, QUANTITATIVE   APTT   PROTIME-INR   MAGNESIUM   T4, FREE   LACTIC ACID, PLASMA   SARS-COV-2 RDRP GENE   POCT INFLUENZA A/B MOLECULAR          Imaging Results              X-Ray Chest PA And Lateral (Final result)  Result time 10/26/22 23:39:16      Final result by Darek Anderson MD (10/26/22 23:39:16)                   Impression:      No definite radiographic evidence of acute intrathoracic process.  Chronic bibasilar subsegmental opacities.      Electronically signed by: Darek Anderson MD  Date:    10/26/2022  Time:    23:39               Narrative:    EXAMINATION:  XR CHEST PA AND LATERAL    CLINICAL HISTORY:  Cough, unspecified    TECHNIQUE:  PA and lateral views of the chest were performed.    COMPARISON:  Chest radiographs 10/20/2022, 08/20/2021    FINDINGS:  Cardiac monitoring leads overlie the chest.  Cardiac silhouette is stable in size.  There is atherosclerotic calcification of the thoracic aorta.  Lungs demonstrate stable bibasilar opacities, similar to prior examination.  No new large confluent airspace consolidation identified.  No significant volume of pleural fluid or pneumothorax identified.  There are several chronic remote right-sided rib deformities again demonstrated.                                       Medications   aspirin chewable tablet 162 mg (162 mg Oral Given 10/27/22 0029)     Medical Decision Making:   History:   Old Medical Records: I decided to obtain old medical records.  Independently Interpreted Test(s):   I have ordered and independently interpreted X-rays - see prior notes.  I have ordered and independently  interpreted EKG Reading(s) - see prior notes  Clinical Tests:   Lab Tests: Ordered and Reviewed  Radiological Study: Ordered and Reviewed  Medical Tests: Ordered and Reviewed        Scribe Attestation:   Scribe #1: I performed the above scribed service and the documentation accurately describes the services I performed. I attest to the accuracy of the note.      ED Course as of 10/27/22 0102   Wed Oct 26, 2022   9368 EKG 12-lead  EKG:  Time 10:44 p.m..  Rate 102.  Sinus tachycardia.  T-wave flattening in lead aVL.  Nonspecific EKG, no significant arrhythmia, no STEMI. [KB]      ED Course User Index  [KB] Alf De La Cruz MD         MDM:  EKG showed mild sinus tachycardia with rate in low 100's, no acute ischemic changes.  CXR with chronic bibasilar changes without definitive acute intrathoracic process.  Labs with normal WBC count, normal lactic acid, normal electrolytes, normal free T4, and normal BNP.  Initial troponin slightly elevated at 0.035, suspect this could represent demand ischemia with reported episodes of tachycardia with rate in 180's throughout afternoon and evening, patient denied experiencing chest pain with these episodes.  Patient did have image of one of his reported tachycardic episodes on his phone which I reviewed, appears narrow complex and regular, consistent with likely SVT.  Discussed hospital admission with patient for further cardiac monitoring and continued trending of his troponin, however since his symptoms have not recurred while in ED, patient strongly prefers not to stay in the hospital at this time.  Patient willing to sign out AMA, understands risks of doing so without further evaluation as outlined including serious injury, disability, or death.  Encouraged patient to follow up closely with either PCP or his cardiologist which he indicated he plans to do and will contact them in the morning.  Patient also invited to return to ED at any time to pursue admission and additional  testing as outlined during this ED visit.       Physician Attestation for Scribe: I, Alf De La Cruz, reviewed documentation as scribed in my presence, which is both accurate and complete.       Clinical Impression:   Final diagnoses:  [R00.2] Palpitations (Primary)  [R05.9] Cough  [R77.8] Elevated troponin  [I47.1] Paroxysmal SVT (supraventricular tachycardia) - suspected      ED Disposition Condition    AMA Stable            Follow-up:  Tushar Drew MD  [address] [facility] [place] [address]  [phone]    Schedule an appointment as soon as possible for a visit   Follow up with your primary care physician and/or your cardiologist for close outpatient recheck.    [facility] - Emergency Dept  [address] [facility] [address] [phone]    Return to the ED sooner for any new or worsening symptoms or for any other concerns.       Alf De La Cruz MD  10/27/22 0209

## 2022-10-28 ENCOUNTER — PATIENT MESSAGE (OUTPATIENT)
Dept: RHEUMATOLOGY | Facility: CLINIC | Age: 67
End: 2022-10-28
Payer: MEDICARE

## 2022-10-28 ENCOUNTER — HOSPITAL ENCOUNTER (OUTPATIENT)
Dept: CARDIOLOGY | Facility: OTHER | Age: 67
Discharge: HOME | End: 2022-10-28
Attending: INTERNAL MEDICINE
Payer: MEDICARE

## 2022-10-28 VITALS
SYSTOLIC BLOOD PRESSURE: 155 MMHG | BODY MASS INDEX: 35 KG/M2 | HEIGHT: 71 IN | HEART RATE: 98 BPM | DIASTOLIC BLOOD PRESSURE: 84 MMHG | WEIGHT: 250 LBS

## 2022-10-28 DIAGNOSIS — I50.22 CHRONIC SYSTOLIC HEART FAILURE: ICD-10-CM

## 2022-10-28 LAB
AV INDEX (PROSTH): 0.88
AV MEAN GRADIENT: 2 MMHG
AV PEAK GRADIENT: 4 MMHG
AV VALVE AREA: 2.81 CM2
AV VELOCITY RATIO: 0.89
BSA FOR ECHO PROCEDURE: 2.38 M2
CV ECHO LV RWT: 0.52 CM
DOP CALC AO PEAK VEL: 1.04 M/S
DOP CALC AO VTI: 15.4 CM
DOP CALC LVOT AREA: 3.2 CM2
DOP CALC LVOT DIAMETER: 2.02 CM
DOP CALC LVOT PEAK VEL: 0.93 M/S
DOP CALC LVOT STROKE VOLUME: 43.24 CM3
DOP CALCLVOT PEAK VEL VTI: 13.5 CM
E WAVE DECELERATION TIME: 198.06 MSEC
E/A RATIO: 0.63
ECHO LV POSTERIOR WALL: 1.11 CM (ref 0.6–1.1)
EJECTION FRACTION: 55 %
FRACTIONAL SHORTENING: 28 % (ref 28–44)
INTERVENTRICULAR SEPTUM: 1.42 CM (ref 0.6–1.1)
IVC DIAMETER: 1.46 CM
IVRT: 55.36 MSEC
LA MAJOR: 5.34 CM
LA MINOR: 5.46 CM
LA WIDTH: 3.6 CM
LEFT ATRIUM SIZE: 3.93 CM
LEFT ATRIUM VOLUME INDEX MOD: 26.3 ML/M2
LEFT ATRIUM VOLUME INDEX: 28 ML/M2
LEFT ATRIUM VOLUME MOD: 61 CM3
LEFT ATRIUM VOLUME: 64.93 CM3
LEFT INTERNAL DIMENSION IN SYSTOLE: 3.07 CM (ref 2.1–4)
LEFT VENTRICLE DIASTOLIC VOLUME INDEX: 34.9 ML/M2
LEFT VENTRICLE DIASTOLIC VOLUME: 80.96 ML
LEFT VENTRICLE MASS INDEX: 84 G/M2
LEFT VENTRICLE SYSTOLIC VOLUME INDEX: 15.9 ML/M2
LEFT VENTRICLE SYSTOLIC VOLUME: 36.99 ML
LEFT VENTRICULAR INTERNAL DIMENSION IN DIASTOLE: 4.25 CM (ref 3.5–6)
LEFT VENTRICULAR MASS: 196.04 G
LVOT MG: 1.7 MMHG
LVOT MV: 0.61 CM/S
MV PEAK A VEL: 0.84 M/S
MV PEAK E VEL: 0.53 M/S
MV STENOSIS PRESSURE HALF TIME: 57.44 MS
MV VALVE AREA P 1/2 METHOD: 3.83 CM2
PISA TR MAX VEL: 2.01 M/S
PULM VEIN S/D RATIO: 0.62
PV PEAK D VEL: 0.5 M/S
PV PEAK S VEL: 0.31 M/S
PV PEAK VELOCITY: 0.7 CM/S
RA MAJOR: 4.13 CM
RA PRESSURE: 3 MMHG
RA WIDTH: 2.4 CM
TR MAX PG: 16 MMHG
TRICUSPID ANNULAR PLANE SYSTOLIC EXCURSION: 1.74 CM
TV REST PULMONARY ARTERY PRESSURE: 19 MMHG

## 2022-10-28 PROCEDURE — 93306 TTE W/DOPPLER COMPLETE: CPT

## 2022-10-28 PROCEDURE — 93306 ECHO (CUPID ONLY): ICD-10-PCS | Mod: 26,,, | Performed by: INTERNAL MEDICINE

## 2022-10-28 PROCEDURE — 93306 TTE W/DOPPLER COMPLETE: CPT | Mod: 26,,, | Performed by: INTERNAL MEDICINE

## 2022-11-01 ENCOUNTER — CLINICAL SUPPORT (OUTPATIENT)
Dept: DIABETES | Facility: CLINIC | Age: 67
End: 2022-11-01
Payer: MEDICARE

## 2022-11-01 ENCOUNTER — PATIENT MESSAGE (OUTPATIENT)
Dept: RHEUMATOLOGY | Facility: CLINIC | Age: 67
End: 2022-11-01
Payer: MEDICARE

## 2022-11-01 ENCOUNTER — PATIENT MESSAGE (OUTPATIENT)
Dept: DIABETES | Facility: CLINIC | Age: 67
End: 2022-11-01

## 2022-11-01 DIAGNOSIS — Z79.4 TYPE 2 DIABETES MELLITUS WITH OTHER SPECIFIED COMPLICATION, WITH LONG-TERM CURRENT USE OF INSULIN: Primary | ICD-10-CM

## 2022-11-01 DIAGNOSIS — E11.69 TYPE 2 DIABETES MELLITUS WITH OTHER SPECIFIED COMPLICATION, WITH LONG-TERM CURRENT USE OF INSULIN: Primary | ICD-10-CM

## 2022-11-01 DIAGNOSIS — Z79.4 TYPE 2 DIABETES MELLITUS WITH OTHER SPECIFIED COMPLICATION, WITH LONG-TERM CURRENT USE OF INSULIN: ICD-10-CM

## 2022-11-01 DIAGNOSIS — E11.69 TYPE 2 DIABETES MELLITUS WITH OTHER SPECIFIED COMPLICATION, WITH LONG-TERM CURRENT USE OF INSULIN: ICD-10-CM

## 2022-11-01 PROCEDURE — G0108 DIAB MANAGE TRN  PER INDIV: HCPCS | Mod: S$GLB,,, | Performed by: DIETITIAN, REGISTERED

## 2022-11-01 PROCEDURE — 99999 PR PBB SHADOW E&M-EST. PATIENT-LVL II: ICD-10-PCS | Mod: PBBFAC,,, | Performed by: DIETITIAN, REGISTERED

## 2022-11-01 PROCEDURE — G0108 PR DIAB MANAGE TRN  PER INDIV: ICD-10-PCS | Mod: S$GLB,,, | Performed by: DIETITIAN, REGISTERED

## 2022-11-01 PROCEDURE — 99999 PR PBB SHADOW E&M-EST. PATIENT-LVL II: CPT | Mod: PBBFAC,,, | Performed by: DIETITIAN, REGISTERED

## 2022-11-01 RX ORDER — ERGOCALCIFEROL 1.25 MG/1
50000 CAPSULE ORAL
Qty: 12 CAPSULE | Refills: 3 | Status: SHIPPED | OUTPATIENT
Start: 2022-11-01 | End: 2024-01-05 | Stop reason: SDUPTHER

## 2022-11-02 ENCOUNTER — OFFICE VISIT (OUTPATIENT)
Dept: CARDIOLOGY | Facility: CLINIC | Age: 67
End: 2022-11-02
Payer: MEDICARE

## 2022-11-02 ENCOUNTER — PATIENT MESSAGE (OUTPATIENT)
Dept: CARDIOLOGY | Facility: CLINIC | Age: 67
End: 2022-11-02

## 2022-11-02 VITALS
SYSTOLIC BLOOD PRESSURE: 108 MMHG | BODY MASS INDEX: 34.27 KG/M2 | OXYGEN SATURATION: 94 % | DIASTOLIC BLOOD PRESSURE: 76 MMHG | WEIGHT: 245.69 LBS | HEART RATE: 113 BPM

## 2022-11-02 DIAGNOSIS — I50.22 CHRONIC SYSTOLIC HEART FAILURE: Primary | ICD-10-CM

## 2022-11-02 DIAGNOSIS — E66.01 SEVERE OBESITY: ICD-10-CM

## 2022-11-02 DIAGNOSIS — I25.10 ATHEROSCLEROSIS OF NATIVE CORONARY ARTERY OF NATIVE HEART WITHOUT ANGINA PECTORIS: ICD-10-CM

## 2022-11-02 DIAGNOSIS — I47.10 SUPRAVENTRICULAR TACHYCARDIA: ICD-10-CM

## 2022-11-02 DIAGNOSIS — Z79.4 TYPE 2 DIABETES MELLITUS WITH OTHER CIRCULATORY COMPLICATION, WITH LONG-TERM CURRENT USE OF INSULIN: ICD-10-CM

## 2022-11-02 DIAGNOSIS — J44.9 CHRONIC OBSTRUCTIVE PULMONARY DISEASE, UNSPECIFIED COPD TYPE: ICD-10-CM

## 2022-11-02 DIAGNOSIS — I70.209 ATHEROSCLEROTIC PERIPHERAL VASCULAR DISEASE: ICD-10-CM

## 2022-11-02 DIAGNOSIS — E11.59 TYPE 2 DIABETES MELLITUS WITH OTHER CIRCULATORY COMPLICATION, WITH LONG-TERM CURRENT USE OF INSULIN: ICD-10-CM

## 2022-11-02 DIAGNOSIS — I10 ESSENTIAL HYPERTENSION: ICD-10-CM

## 2022-11-02 PROCEDURE — 3051F PR MOST RECENT HEMOGLOBIN A1C LEVEL 7.0 - < 8.0%: ICD-10-PCS | Mod: CPTII,S$GLB,, | Performed by: INTERNAL MEDICINE

## 2022-11-02 PROCEDURE — 99999 PR PBB SHADOW E&M-EST. PATIENT-LVL V: CPT | Mod: PBBFAC,,, | Performed by: INTERNAL MEDICINE

## 2022-11-02 PROCEDURE — 3008F PR BODY MASS INDEX (BMI) DOCUMENTED: ICD-10-PCS | Mod: CPTII,S$GLB,, | Performed by: INTERNAL MEDICINE

## 2022-11-02 PROCEDURE — 3288F FALL RISK ASSESSMENT DOCD: CPT | Mod: CPTII,S$GLB,, | Performed by: INTERNAL MEDICINE

## 2022-11-02 PROCEDURE — 1101F PR PT FALLS ASSESS DOC 0-1 FALLS W/OUT INJ PAST YR: ICD-10-PCS | Mod: CPTII,S$GLB,, | Performed by: INTERNAL MEDICINE

## 2022-11-02 PROCEDURE — 1159F PR MEDICATION LIST DOCUMENTED IN MEDICAL RECORD: ICD-10-PCS | Mod: CPTII,S$GLB,, | Performed by: INTERNAL MEDICINE

## 2022-11-02 PROCEDURE — 3008F BODY MASS INDEX DOCD: CPT | Mod: CPTII,S$GLB,, | Performed by: INTERNAL MEDICINE

## 2022-11-02 PROCEDURE — 1160F RVW MEDS BY RX/DR IN RCRD: CPT | Mod: CPTII,S$GLB,, | Performed by: INTERNAL MEDICINE

## 2022-11-02 PROCEDURE — 1126F PR PAIN SEVERITY QUANTIFIED, NO PAIN PRESENT: ICD-10-PCS | Mod: CPTII,S$GLB,, | Performed by: INTERNAL MEDICINE

## 2022-11-02 PROCEDURE — 1160F PR REVIEW ALL MEDS BY PRESCRIBER/CLIN PHARMACIST DOCUMENTED: ICD-10-PCS | Mod: CPTII,S$GLB,, | Performed by: INTERNAL MEDICINE

## 2022-11-02 PROCEDURE — 99215 OFFICE O/P EST HI 40 MIN: CPT | Mod: S$GLB,,, | Performed by: INTERNAL MEDICINE

## 2022-11-02 PROCEDURE — 3078F PR MOST RECENT DIASTOLIC BLOOD PRESSURE < 80 MM HG: ICD-10-PCS | Mod: CPTII,S$GLB,, | Performed by: INTERNAL MEDICINE

## 2022-11-02 PROCEDURE — 1126F AMNT PAIN NOTED NONE PRSNT: CPT | Mod: CPTII,S$GLB,, | Performed by: INTERNAL MEDICINE

## 2022-11-02 PROCEDURE — 3051F HG A1C>EQUAL 7.0%<8.0%: CPT | Mod: CPTII,S$GLB,, | Performed by: INTERNAL MEDICINE

## 2022-11-02 PROCEDURE — 3078F DIAST BP <80 MM HG: CPT | Mod: CPTII,S$GLB,, | Performed by: INTERNAL MEDICINE

## 2022-11-02 PROCEDURE — 99499 UNLISTED E&M SERVICE: CPT | Mod: S$GLB,,, | Performed by: INTERNAL MEDICINE

## 2022-11-02 PROCEDURE — 99215 PR OFFICE/OUTPT VISIT, EST, LEVL V, 40-54 MIN: ICD-10-PCS | Mod: S$GLB,,, | Performed by: INTERNAL MEDICINE

## 2022-11-02 PROCEDURE — 4010F ACE/ARB THERAPY RXD/TAKEN: CPT | Mod: CPTII,S$GLB,, | Performed by: INTERNAL MEDICINE

## 2022-11-02 PROCEDURE — 1159F MED LIST DOCD IN RCRD: CPT | Mod: CPTII,S$GLB,, | Performed by: INTERNAL MEDICINE

## 2022-11-02 PROCEDURE — 3288F PR FALLS RISK ASSESSMENT DOCUMENTED: ICD-10-PCS | Mod: CPTII,S$GLB,, | Performed by: INTERNAL MEDICINE

## 2022-11-02 PROCEDURE — 3074F PR MOST RECENT SYSTOLIC BLOOD PRESSURE < 130 MM HG: ICD-10-PCS | Mod: CPTII,S$GLB,, | Performed by: INTERNAL MEDICINE

## 2022-11-02 PROCEDURE — 1101F PT FALLS ASSESS-DOCD LE1/YR: CPT | Mod: CPTII,S$GLB,, | Performed by: INTERNAL MEDICINE

## 2022-11-02 PROCEDURE — 99999 PR PBB SHADOW E&M-EST. PATIENT-LVL V: ICD-10-PCS | Mod: PBBFAC,,, | Performed by: INTERNAL MEDICINE

## 2022-11-02 PROCEDURE — 3074F SYST BP LT 130 MM HG: CPT | Mod: CPTII,S$GLB,, | Performed by: INTERNAL MEDICINE

## 2022-11-02 PROCEDURE — 4010F PR ACE/ARB THEARPY RXD/TAKEN: ICD-10-PCS | Mod: CPTII,S$GLB,, | Performed by: INTERNAL MEDICINE

## 2022-11-02 NOTE — PROGRESS NOTES
[facility]    Chief Complaint  Chief Complaint   Patient presents with    Coronary Artery Disease       HPI:    Presents for 1st follow-up in over a year.  Recent problems started with an inguinal infection which was treated with antibiotics.  Then developed constipation.  Went to the emergency room.  CT scan showed pneumonia.  So was started additional antibiotics.  While this was going on, he started feeling his heart race.  Used his smart phone robert to check a rhythm which showed a heart rate around 170.  He brings those strips.  It was a regular narrow complex tachycardia.  May have a short RP interval and represent AVNRT.  Nevertheless had a couple of episodes which otherwise had no associated symptoms.  Was evaluated in the emergency room and declined admission.  Has been asymptomatic since.  Has been self titrating his diuretics with success.  No angina.    Medications  Current Outpatient Medications   Medication Sig Dispense Refill    aspirin 81 MG Chew Take 81 mg by mouth.      blood sugar diagnostic Strp To check BG 3 times daily, to use with insurance preferred meter 300 each 11    blood-glucose meter kit Use to check glucose as directed 1 each 0    ciclopirox (PENLAC) 8 % Soln Apply topically nightly. 6.6 mL 11    clotrimazole (LOTRIMIN) 1 % cream Apply topically 2 (two) times daily. 60 g 0    dulaglutide (TRULICITY) 4.5 mg/0.5 mL pen injector Inject 4.5 mg into the skin every 7 days. 12 pen 3    ergocalciferol (ERGOCALCIFEROL) 50,000 unit Cap Take 1 capsule (50,000 Units total) by mouth every 7 days. 12 capsule 3    ezetimibe (ZETIA) 10 mg tablet TAKE 1 TABLET BY MOUTH EVERY DAY 30 tablet 8    folic acid (FOLVITE) 1 MG tablet   4    furosemide (LASIX) 40 MG tablet Take 1 tablet (40 mg total) by mouth once daily. (Patient taking differently: Take 40 mg by mouth every other day. Alternates 40mg and 80mg every other day) 45 tablet 0    hydroCHLOROthiazide (HYDRODIURIL) 25 MG tablet Take 1  "tablet (25 mg total) by mouth once daily. (Patient taking differently: Take 25 mg by mouth as needed.) 30 tablet 11    hydrocodone-acetaminophen 10-325mg (NORCO)  mg Tab   0    insulin admin supplies InPn InPen device, use to inject mealtime insulin 3 times daily. 1 pen to use, 1 for backup as needed. 2 each 3    insulin degludec (TRESIBA FLEXTOUCH U-200) 200 unit/mL (3 mL) insulin pen Inject 50 Units into the skin 2 (two) times a day. 15 pen 3    lamiVUDine (EPIVIR) 150 MG Tab Take 1 tablet (150 mg total) by mouth once daily. 60 tablet 11    multivitamin (THERAGRAN) per tablet Take 1 tablet by mouth once daily.      nitroGLYCERIN (NITROSTAT) 0.4 MG SL tablet Place 1 tablet (0.4 mg total) under the tongue every 5 (five) minutes as needed for Chest pain. 25 tablet 11    NOVOLOG PENFILL U-100 INSULIN 100 unit/mL Crtg INJECT 30 UNITS INTO THE SKIN THREE TIMES A DAY WITH MEALS. MAX DAILY DOSE 100 UNITS PER DAY 30 each 11    nystatin (MYCOSTATIN) cream Apply topically 3 (three) times daily as needed for Dry Skin. 2 each 11    olmesartan (BENICAR) 40 MG tablet Take 1 tablet (40 mg total) by mouth nightly. 90 tablet 3    ondansetron (ZOFRAN) 4 MG tablet Take 1 tablet (4 mg total) by mouth every 8 (eight) hours as needed for Nausea. 60 tablet 6    OXYGEN-AIR DELIVERY SYSTEMS MISC by [facility].(Non-Drug; Combo Route) route.      pen needle, diabetic (BD ULTRA-FINE RAHUL PEN NEEDLE) 32 gauge x 5/32" Ndle Use to inject insulin 5 times daily 500 each 3    potassium chloride (MICRO-K) 10 MEQ CpSR Take 10 mEq by mouth once daily.      predniSONE (DELTASONE) 10 MG tablet Take 1 tablet (10 mg total) by mouth once daily. 90 tablet 3    predniSONE (DELTASONE) 2.5 MG tablet Take 1 tablet (2.5 mg total) by mouth once daily. 90 tablet 3    predniSONE (DELTASONE) 2.5 MG tablet TAKE 3 TABLETS (7.5 MG TOTAL) BY MOUTH ONCE DAILY. 90 tablet 1    predniSONE (DELTASONE) 5 MG tablet TAKE 1 TABLET BY MOUTH EVERY DAY 90 tablet 1    PROAIR HFA 90 " mcg/actuation inhaler       spironolactone (ALDACTONE) 25 MG tablet TAKE 1 TABLET BY MOUTH EVERY DAY 90 tablet 3    sulfamethoxazole-trimethoprim 800-160mg (BACTRIM DS) 800-160 mg Tab TAKE 1 TABLET BY MOUTH ON MON,WEDS, FRIDAY 12 tablet 5    vortioxetine (TRINTELLIX) 10 mg Tab Take 10 mg by mouth once daily at 6am.       No current facility-administered medications for this visit.        History  Past Medical History:   Diagnosis Date    Arthritis     Atrial myxoma     Coronary atherosclerosis     Diabetes mellitus, type 2     Difficult intubation     Hepatitis B     Hyperlipidemia     Hypertension     Non-alcoholic fatty liver disease     Rheumatoid arthritis     Rheumatoid arthritis flare 07/12/2021    Stroke     TIA    Systolic heart failure      Past Surgical History:   Procedure Laterality Date    CORONARY ANGIOGRAPHY N/A 3/10/2021    Procedure: ANGIOGRAM, CORONARY ARTERY - right radial;  Surgeon: Shemar Dempsey MD;  Location: [facility] CATH LAB;  Service: Cardiology;  Laterality: N/A;    CORONARY STENT PLACEMENT  03/10/2021    prox-mid RCA [place] 4.5 x 26 mm, 4.5 x 12 mm    LUNG LOBECTOMY Right 2008    RUL lobectomy after removal of atrial myxoma    PLEURA BIOPSY      RESECTION OF ATRIAL MYXOMA  2007     Social History     Socioeconomic History    Marital status: Single   Occupational History    Occupation: , respiratory therapist, [employer]     Comment: Retired   Tobacco Use    Smoking status: Every Day     Packs/day: 1.00     Years: 35.00     Pack years: 35.00     Types: Cigarettes    Smokeless tobacco: Never   Substance and Sexual Activity    Alcohol use: Not Currently    Drug use: No    Sexual activity: Never     Family History   Problem Relation Age of Onset    Hodgkin's lymphoma Mother     Diabetes type II Mother     Kidney failure Father     Diabetes type I Father     Cancer Sister         Allergies  Review of patient's allergies indicates:   Allergen Reactions    Enbrel [etanercept]  Shortness Of Breath     CHF    Nsaids (non-steroidal anti-inflammatory drug) Other (See Comments)     hypertention  Other reaction(s): Other (See Comments)  hypertention  HTN    Statins-hmg-coa reductase inhibitors Other (See Comments)     Heart arrythemias  Other reaction(s): Other (See Comments)  Heart arrythemias  Joint pain and cardiac arrythmias    Pcn [penicillins] Rash       Review of Systems   Review of Systems   Constitutional: Negative for malaise/fatigue and weight loss.   Eyes:  Negative for visual disturbance.   Cardiovascular:  Positive for dyspnea on exertion and palpitations. Negative for chest pain, claudication, cyanosis, irregular heartbeat, leg swelling, near-syncope, orthopnea, paroxysmal nocturnal dyspnea and syncope.   Respiratory:  Positive for shortness of breath. Negative for hemoptysis, sleep disturbances due to breathing and wheezing.    Hematologic/Lymphatic: Negative for bleeding problem. Does not bruise/bleed easily.   Skin:  Negative for poor wound healing.   Musculoskeletal:  Negative for muscle cramps and myalgias.   Gastrointestinal:  Negative for abdominal pain, anorexia, diarrhea, heartburn, hematemesis, hematochezia, melena, nausea and vomiting.   Genitourinary:  Negative for hematuria and nocturia.   Neurological:  Negative for excessive daytime sleepiness, dizziness, focal weakness, light-headedness and weakness.     Physical Exam  Vitals:    11/02/22 1547   BP: 108/76   Pulse: (!) 113     Wt Readings from Last 1 Encounters:   11/02/22 111.4 kg (245 lb 11.2 oz)     Physical Exam  Constitutional:       General: He is not in acute distress.     Appearance: He is obese. He is not toxic-appearing or diaphoretic.   HENT:      Head: Normocephalic and atraumatic.   Eyes:      General: No scleral icterus.     Conjunctiva/sclera: Conjunctivae normal.   Neck:      Thyroid: No thyromegaly.      Vascular: No carotid bruit, hepatojugular reflux or JVD.   Cardiovascular:      Rate and  Rhythm: Normal rate and regular rhythm. No extrasystoles are present.     Chest Wall: PMI is not displaced.      Pulses:           Carotid pulses are 2+ on the right side and 2+ on the left side.       Radial pulses are 2+ on the right side and 2+ on the left side.        Dorsalis pedis pulses are 2+ on the right side and 2+ on the left side.        Posterior tibial pulses are 2+ on the right side and 2+ on the left side.      Heart sounds: S1 normal and S2 normal. No murmur heard.    Gallop present. S4 sounds present. No S3 sounds.   Pulmonary:      Effort: No accessory muscle usage or respiratory distress.      Breath sounds: No decreased breath sounds, wheezing, rhonchi or rales.   Abdominal:      General: Bowel sounds are normal. There is no abdominal bruit.      Palpations: Abdomen is soft. There is no hepatomegaly, splenomegaly or pulsatile mass.      Tenderness: There is no abdominal tenderness.   Musculoskeletal:         General: No tenderness or deformity.      Right lower leg: No edema.      Left lower leg: No edema.   Skin:     General: Skin is warm and dry.      Coloration: Skin is not pale.      Nails: There is no clubbing.   Neurological:      General: No focal deficit present.      Mental Status: He is alert and oriented to person, place, and time.      Sensory: Sensation is intact.      Motor: Motor function is intact.   Psychiatric:         Speech: Speech normal.         Behavior: Behavior normal. Behavior is cooperative.       Labs  Hospital Outpatient Visit on 10/28/2022   Component Date Value Ref Range Status    BSA 10/28/2022 2.38  m2 Final    LA WIDTH 10/28/2022 3.60  cm Final    IVC diameter 10/28/2022 1.46  cm Final    Left Ventricular Outflow Tract Misti* 10/28/2022 0.61  cm/s Final    Left Ventricular Outflow Tract Misti* 10/28/2022 1.70  mmHg Final    PV PEAK VELOCITY 10/28/2022 0.70  cm/s Final    LVIDd 10/28/2022 4.25  3.5 - 6.0 cm Final    IVS 10/28/2022 1.42 (A)  0.6 - 1.1 cm Final     Posterior Wall 10/28/2022 1.11 (A)  0.6 - 1.1 cm Final    LVIDs 10/28/2022 3.07  2.1 - 4.0 cm Final    FS 10/28/2022 28  28 - 44 % Final    LA volume 10/28/2022 64.93  cm3 Final    LV mass 10/28/2022 196.04  g Final    LA size 10/28/2022 3.93  cm Final    TAPSE 10/28/2022 1.74  cm Final    Left Ventricle Relative Wall Thick* 10/28/2022 0.52  cm Final    AV mean gradient 10/28/2022 2  mmHg Final    AV valve area 10/28/2022 2.81  cm2 Final    AV Velocity Ratio 10/28/2022 0.89   Final    AV index (prosthetic) 10/28/2022 0.88   Final    MV valve area p 1/2 method 10/28/2022 3.83  cm2 Final    E/A ratio 10/28/2022 0.63   Final    E wave deceleration time 10/28/2022 198.06  msec Final    IVRT 10/28/2022 55.36  msec Final    Pulm vein S/D ratio 10/28/2022 0.62   Final    LVOT diameter 10/28/2022 2.02  cm Final    LVOT area 10/28/2022 3.2  cm2 Final    LVOT peak ildefonso 10/28/2022 0.93  m/s Final    LVOT peak VTI 10/28/2022 13.50  cm Final    Ao peak ildefonso 10/28/2022 1.04  m/s Final    Ao VTI 10/28/2022 15.4  cm Final    LVOT stroke volume 10/28/2022 43.24  cm3 Final    AV peak gradient 10/28/2022 4  mmHg Final    MV Peak E Ildefonso 10/28/2022 0.53  m/s Final    TR Max Ildefonso 10/28/2022 2.01  m/s Final    MV stenosis pressure 1/2 time 10/28/2022 57.44  ms Final    MV Peak A Ildefonso 10/28/2022 0.84  m/s Final    PV Peak S Ildefonso 10/28/2022 0.31  m/s Final    PV Peak D Ildefonso 10/28/2022 0.50  m/s Final    LV Systolic Volume 10/28/2022 36.99  mL Final    LV Systolic Volume Index 10/28/2022 15.9  mL/m2 Final    LV Diastolic Volume 10/28/2022 80.96  mL Final    LV Diastolic Volume Index 10/28/2022 34.90  mL/m2 Final    LA Volume Index 10/28/2022 28.0  mL/m2 Final    LV Mass Index 10/28/2022 84  g/m2 Final    RA Major Axis 10/28/2022 4.13  cm Final    Left Atrium Minor Axis 10/28/2022 5.46  cm Final    Left Atrium Major Axis 10/28/2022 5.34  cm Final    Triscuspid Valve Regurgitation Pea* 10/28/2022 16  mmHg Final    LA Volume Index (Mod) 10/28/2022  26.3  mL/m2 Final    LA volume (mod) 10/28/2022 61.00  cm3 Final    RA Width 10/28/2022 2.40  cm Final    Right Atrial Pressure (from IVC) 10/28/2022 3  mmHg Final    EF 10/28/2022 55  % Final    TV rest pulmonary artery pressure 10/28/2022 19  mmHg Final   Admission on 10/26/2022, Discharged on 10/27/2022   Component Date Value Ref Range Status    BNP 10/26/2022 79  0 - 99 pg/mL Final    Values of less than 100 pg/ml are consistent with non-CHF populations.    WBC 10/26/2022 10.12  3.90 - 12.70 K/uL Final    RBC 10/26/2022 5.56  4.60 - 6.20 M/uL Final    Hemoglobin 10/26/2022 19.7 (H)  14.0 - 18.0 g/dL Final    Hematocrit 10/26/2022 54.6 (H)  40.0 - 54.0 % Final    MCV 10/26/2022 98  82 - 98 fL Final    MCH 10/26/2022 35.4 (H)  27.0 - 31.0 pg Final    MCHC 10/26/2022 36.1 (H)  32.0 - 36.0 g/dL Final    RDW 10/26/2022 12.8  11.5 - 14.5 % Final    Platelets 10/26/2022 195  150 - 450 K/uL Final    MPV 10/26/2022 11.2  9.2 - 12.9 fL Final    Immature Granulocytes 10/26/2022 1.5 (H)  0.0 - 0.5 % Final    Gran # (ANC) 10/26/2022 6.9  1.8 - 7.7 K/uL Final    Immature Grans (Abs) 10/26/2022 0.15 (H)  0.00 - 0.04 K/uL Final    Comment: Mild elevation in immature granulocytes is non specific and   can be seen in a variety of conditions including stress response,   acute inflammation, trauma and pregnancy. Correlation with other   laboratory and clinical findings is essential.      Lymph # 10/26/2022 2.1  1.0 - 4.8 K/uL Final    Mono # 10/26/2022 0.8  0.3 - 1.0 K/uL Final    Eos # 10/26/2022 0.1  0.0 - 0.5 K/uL Final    Baso # 10/26/2022 0.06  0.00 - 0.20 K/uL Final    nRBC 10/26/2022 0  0 /100 WBC Final    Gran % 10/26/2022 68.2  38.0 - 73.0 % Final    Lymph % 10/26/2022 21.0  18.0 - 48.0 % Final    Mono % 10/26/2022 8.1  4.0 - 15.0 % Final    Eosinophil % 10/26/2022 0.6  0.0 - 8.0 % Final    Basophil % 10/26/2022 0.6  0.0 - 1.9 % Final    Differential Method 10/26/2022 Automated   Final    Sodium 10/26/2022 135 (L)  136 -  145 mmol/L Final    Potassium 10/26/2022 4.3  3.5 - 5.1 mmol/L Final    Chloride 10/26/2022 99  95 - 110 mmol/L Final    CO2 10/26/2022 24  23 - 29 mmol/L Final    Glucose 10/26/2022 193 (H)  70 - 110 mg/dL Final    BUN 10/26/2022 21  8 - 23 mg/dL Final    Creatinine 10/26/2022 1.3  0.5 - 1.4 mg/dL Final    Calcium 10/26/2022 9.4  8.7 - 10.5 mg/dL Final    Total Protein 10/26/2022 6.7  6.0 - 8.4 g/dL Final    Albumin 10/26/2022 3.5  3.5 - 5.2 g/dL Final    Total Bilirubin 10/26/2022 0.3  0.1 - 1.0 mg/dL Final    Comment: For infants and newborns, interpretation of results should be based  on gestational age, weight and in agreement with clinical  observations.    Premature Infant recommended reference ranges:  Up to 24 hours.............<8.0 mg/dL  Up to 48 hours............<12.0 mg/dL  3-5 days..................<15.0 mg/dL  6-29 days.................<15.0 mg/dL      Alkaline Phosphatase 10/26/2022 87  55 - 135 U/L Final    AST 10/26/2022 27  10 - 40 U/L Final    ALT 10/26/2022 52 (H)  10 - 44 U/L Final    Anion Gap 10/26/2022 12  8 - 16 mmol/L Final    eGFR 10/26/2022 59 (A)  >60 mL/min/1.73 m^2 Final    D-Dimer 10/26/2022 0.40  <0.50 mg/L FEU Final    Comment: The quantitative D-dimer assay should be used as an aid in   the diagnosis of deep vein thrombosis and pulmonary embolism  in patients with the appropriate presentation and clinical  history. The upper limit of the reference interval and the clinical   cut off   point are identical. Causes of a positive (>0.50 mg/L FEU) D-Dimer   test  include, but are not limited to: DVT, PE, DIC, thrombolytic   therapy, anticoagulant therapy, recent surgery, trauma, or   pregnancy, disseminated malignancy, aortic aneurysm, cirrhosis,  and severe infection. False negative results may occur in   patients with distal DVT.  JOCELYN^612^^3^  LOT^610^DDiSup^[id number]\DDiBuf^[id number]\DDiReag^[id number]      aPTT 10/26/2022 22.6  21.0 - 32.0 sec Final    Comment: aPTT therapeutic range = 39-69  seconds  LOT^050^APTT FSL^[id number]      Prothrombin Time 10/26/2022 10.8  9.0 - 12.5 sec Final    INR 10/26/2022 1.0  0.8 - 1.2 Final    Comment: Coumadin Therapy:  2.0 - 3.0 for INR for all indicators except mechanical heart valves  and antiphospholipid syndromes which should use 2.5 - 3.5.  LOT^040^PT Inn^400000      Magnesium 10/26/2022 2.0  1.6 - 2.6 mg/dL Final    Troponin I 10/26/2022 0.035 (H)  0.000 - 0.026 ng/mL Final    Comment: The reference interval for Troponin I represents the 99th percentile   cutoff   for our facility and is consistent with 3rd generation assay   performance.      TSH 10/26/2022 4.602 (H)  0.400 - 4.000 uIU/mL Final    Free T4 10/26/2022 1.03  0.71 - 1.51 ng/dL Final    POC Rapid COVID 10/26/2022 Negative  Negative Final     Acceptable 10/26/2022 Yes   Final    POC Molecular Influenza A Ag 10/26/2022 Negative  Negative, Not Reported Final    POC Molecular Influenza B Ag 10/26/2022 Negative  Negative, Not Reported Final     Acceptable 10/26/2022 Yes   Final    Lactate (Lactic Acid) 10/26/2022 1.6  0.5 - 2.2 mmol/L Final    Comment: Falsely low lactic acid results can be found in samples   containing >=13.0 mg/dL total bilirubin and/or >=3.5 mg/dL   direct bilirubin.  Specimen slightly hemolyzed     Lab Visit on 10/21/2022   Component Date Value Ref Range Status    WBC 10/21/2022 10.23  3.90 - 12.70 K/uL Final    RBC 10/21/2022 5.49  4.60 - 6.20 M/uL Final    Hemoglobin 10/21/2022 18.6 (H)  14.0 - 18.0 g/dL Final    Hematocrit 10/21/2022 56.8 (H)  40.0 - 54.0 % Final    MCV 10/21/2022 104 (H)  82 - 98 fL Final    MCH 10/21/2022 33.9 (H)  27.0 - 31.0 pg Final    MCHC 10/21/2022 32.7  32.0 - 36.0 g/dL Final    RDW 10/21/2022 12.9  11.5 - 14.5 % Final    Platelets 10/21/2022 180  150 - 450 K/uL Final    MPV 10/21/2022 11.6  9.2 - 12.9 fL Final    Immature Granulocytes 10/21/2022 1.2 (H)  0.0 - 0.5 % Final    Gran # (ANC) 10/21/2022 8.1 (H)  1.8 - 7.7 K/uL Final     Immature Grans (Abs) 10/21/2022 0.12 (H)  0.00 - 0.04 K/uL Final    Comment: Mild elevation in immature granulocytes is non specific and   can be seen in a variety of conditions including stress response,   acute inflammation, trauma and pregnancy. Correlation with other   laboratory and clinical findings is essential.      Lymph # 10/21/2022 1.1  1.0 - 4.8 K/uL Final    Mono # 10/21/2022 0.8  0.3 - 1.0 K/uL Final    Eos # 10/21/2022 0.0  0.0 - 0.5 K/uL Final    Baso # 10/21/2022 0.03  0.00 - 0.20 K/uL Final    nRBC 10/21/2022 0  0 /100 WBC Final    Gran % 10/21/2022 79.3 (H)  38.0 - 73.0 % Final    Lymph % 10/21/2022 11.1 (L)  18.0 - 48.0 % Final    Mono % 10/21/2022 7.8  4.0 - 15.0 % Final    Eosinophil % 10/21/2022 0.3  0.0 - 8.0 % Final    Basophil % 10/21/2022 0.3  0.0 - 1.9 % Final    Differential Method 10/21/2022 Automated   Final    CRP 10/21/2022 8.8 (H)  0.0 - 8.2 mg/L Final    Sodium 10/21/2022 139  136 - 145 mmol/L Final    Potassium 10/21/2022 5.4 (H)  3.5 - 5.1 mmol/L Final    Chloride 10/21/2022 97  95 - 110 mmol/L Final    CO2 10/21/2022 28  23 - 29 mmol/L Final    Glucose 10/21/2022 190 (H)  70 - 110 mg/dL Final    BUN 10/21/2022 27 (H)  8 - 23 mg/dL Final    Creatinine 10/21/2022 1.3  0.5 - 1.4 mg/dL Final    Calcium 10/21/2022 9.6  8.7 - 10.5 mg/dL Final    Total Protein 10/21/2022 7.2  6.0 - 8.4 g/dL Final    Albumin 10/21/2022 3.9  3.5 - 5.2 g/dL Final    Total Bilirubin 10/21/2022 0.6  0.1 - 1.0 mg/dL Final    Comment: For infants and newborns, interpretation of results should be based  on gestational age, weight and in agreement with clinical  observations.    Premature Infant recommended reference ranges:  Up to 24 hours.............<8.0 mg/dL  Up to 48 hours............<12.0 mg/dL  3-5 days..................<15.0 mg/dL  6-29 days.................<15.0 mg/dL      Alkaline Phosphatase 10/21/2022 99  55 - 135 U/L Final    AST 10/21/2022 29  10 - 40 U/L Final    ALT 10/21/2022 49 (H)  10 -  44 U/L Final    Anion Gap 10/21/2022 14  8 - 16 mmol/L Final    eGFR 10/21/2022 59 (A)  >60 mL/min/1.73 m^2 Final    Sed Rate 10/21/2022 3  0 - 10 mm/Hr Final    Vit D, 25-Hydroxy 10/21/2022 20 (L)  30 - 96 ng/mL Final    Comment: Vitamin D deficiency.........<10 ng/mL                              Vitamin D insufficiency......10-29 ng/mL       Vitamin D sufficiency........> or equal to 30 ng/mL  Vitamin D toxicity............>100 ng/mL      Creatinine 10/21/2022 1.3  0.5 - 1.4 mg/dL Final    eGFR 10/21/2022 59 (A)  >60 mL/min/1.73 m^2 Final    Sodium 10/21/2022 139  136 - 145 mmol/L Final    Potassium 10/21/2022 5.4 (H)  3.5 - 5.1 mmol/L Final    Chloride 10/21/2022 97  95 - 110 mmol/L Final    CO2 10/21/2022 28  23 - 29 mmol/L Final    Glucose 10/21/2022 190 (H)  70 - 110 mg/dL Final    BUN 10/21/2022 27 (H)  8 - 23 mg/dL Final    Creatinine 10/21/2022 1.3  0.5 - 1.4 mg/dL Final    Calcium 10/21/2022 9.6  8.7 - 10.5 mg/dL Final    Total Protein 10/21/2022 7.2  6.0 - 8.4 g/dL Final    Albumin 10/21/2022 3.9  3.5 - 5.2 g/dL Final    Total Bilirubin 10/21/2022 0.6  0.1 - 1.0 mg/dL Final    Comment: For infants and newborns, interpretation of results should be based  on gestational age, weight and in agreement with clinical  observations.    Premature Infant recommended reference ranges:  Up to 24 hours.............<8.0 mg/dL  Up to 48 hours............<12.0 mg/dL  3-5 days..................<15.0 mg/dL  6-29 days.................<15.0 mg/dL      Alkaline Phosphatase 10/21/2022 99  55 - 135 U/L Final    AST 10/21/2022 29  10 - 40 U/L Final    ALT 10/21/2022 49 (H)  10 - 44 U/L Final    Anion Gap 10/21/2022 14  8 - 16 mmol/L Final    eGFR 10/21/2022 59 (A)  >60 mL/min/1.73 m^2 Final    WBC 10/21/2022 10.23  3.90 - 12.70 K/uL Final    RBC 10/21/2022 5.49  4.60 - 6.20 M/uL Final    Hemoglobin 10/21/2022 18.6 (H)  14.0 - 18.0 g/dL Final    Hematocrit 10/21/2022 56.8 (H)  40.0 - 54.0 % Final    MCV 10/21/2022 104 (H)  82 -  98 fL Final    MCH 10/21/2022 33.9 (H)  27.0 - 31.0 pg Final    MCHC 10/21/2022 32.7  32.0 - 36.0 g/dL Final    RDW 10/21/2022 12.9  11.5 - 14.5 % Final    Platelets 10/21/2022 180  150 - 450 K/uL Final    MPV 10/21/2022 11.6  9.2 - 12.9 fL Final    Immature Granulocytes 10/21/2022 1.2 (H)  0.0 - 0.5 % Final    Gran # (ANC) 10/21/2022 8.1 (H)  1.8 - 7.7 K/uL Final    Immature Grans (Abs) 10/21/2022 0.12 (H)  0.00 - 0.04 K/uL Final    Comment: Mild elevation in immature granulocytes is non specific and   can be seen in a variety of conditions including stress response,   acute inflammation, trauma and pregnancy. Correlation with other   laboratory and clinical findings is essential.      Lymph # 10/21/2022 1.1  1.0 - 4.8 K/uL Final    Mono # 10/21/2022 0.8  0.3 - 1.0 K/uL Final    Eos # 10/21/2022 0.0  0.0 - 0.5 K/uL Final    Baso # 10/21/2022 0.03  0.00 - 0.20 K/uL Final    nRBC 10/21/2022 0  0 /100 WBC Final    Gran % 10/21/2022 79.3 (H)  38.0 - 73.0 % Final    Lymph % 10/21/2022 11.1 (L)  18.0 - 48.0 % Final    Mono % 10/21/2022 7.8  4.0 - 15.0 % Final    Eosinophil % 10/21/2022 0.3  0.0 - 8.0 % Final    Basophil % 10/21/2022 0.3  0.0 - 1.9 % Final    Differential Method 10/21/2022 Automated   Final    CRP 10/21/2022 8.8 (H)  0.0 - 8.2 mg/L Final    Sed Rate 10/21/2022 3  0 - 10 mm/Hr Final    Specimen UA 10/21/2022 Urine, Clean Catch   Final    Color, UA 10/21/2022 Yellow  Yellow, Straw, Trice Final    Appearance, UA 10/21/2022 Clear  Clear Final    pH, UA 10/21/2022 6.0  5.0 - 8.0 Final    Specific Gravity, UA 10/21/2022 1.025  1.005 - 1.030 Final    Protein, UA 10/21/2022 Negative  Negative Final    Comment: Recommend a 24 hour urine protein or a urine   protein/creatinine ratio if globulin induced proteinuria is  clinically suspected.      Glucose, UA 10/21/2022 Negative  Negative Final    Ketones, UA 10/21/2022 Trace (A)  Negative Final    Bilirubin (UA) 10/21/2022 Negative  Negative Final    Occult Blood UA  10/21/2022 Negative  Negative Final    Nitrite, UA 10/21/2022 Negative  Negative Final    Urobilinogen, UA 10/21/2022 Negative  <2.0 EU/dL Final    Leukocytes, UA 10/21/2022 Negative  Negative Final   Admission on 10/20/2022, Discharged on 10/20/2022   Component Date Value Ref Range Status    Troponin I 10/20/2022 0.013  0.000 - 0.026 ng/mL Final    Comment: The reference interval for Troponin I represents the 99th percentile   cutoff   for our facility and is consistent with 3rd generation assay   performance.      Lactate (Lactic Acid) 10/20/2022 1.9  0.5 - 2.2 mmol/L Final    Comment: Falsely low lactic acid results can be found in samples   containing >=13.0 mg/dL total bilirubin and/or >=3.5 mg/dL   direct bilirubin.      WBC 10/20/2022 11.21  3.90 - 12.70 K/uL Final    RBC 10/20/2022 5.55  4.60 - 6.20 M/uL Final    Hemoglobin 10/20/2022 18.9 (H)  14.0 - 18.0 g/dL Final    Hematocrit 10/20/2022 54.3 (H)  40.0 - 54.0 % Final    MCV 10/20/2022 98  82 - 98 fL Final    MCH 10/20/2022 34.1 (H)  27.0 - 31.0 pg Final    MCHC 10/20/2022 34.8  32.0 - 36.0 g/dL Final    RDW 10/20/2022 12.8  11.5 - 14.5 % Final    Platelets 10/20/2022 116 (L)  150 - 450 K/uL Final    MPV 10/20/2022 12.1  9.2 - 12.9 fL Final    Immature Granulocytes 10/20/2022 Test Not Performed  0.0 - 0.5 % Corrected    CORRECTED RESULT; previously reported as 0.9 on 10/20/2022 at 03:07.    Gran # (ANC) 10/20/2022 Test Not Performed  1.8 - 7.7 K/uL Corrected    CORRECTED RESULT; previously reported as 7.7 on 10/20/2022 at 03:07.    Immature Grans (Abs) 10/20/2022 Test Not Performed  0.00 - 0.04 K/uL Corrected    Comment: Mild elevation in immature granulocytes is non specific and   can be seen in a variety of conditions including stress response,   acute inflammation, trauma and pregnancy. Correlation with other   laboratory and clinical findings is essential.  CORRECTED RESULT; previously reported as 0.10 on 10/20/2022 at 03:07.      Lymph # 10/20/2022  Test Not Performed  1.0 - 4.8 K/uL Corrected    CORRECTED RESULT; previously reported as 2.1 on 10/20/2022 at 03:07.    Mono # 10/20/2022 Test Not Performed  0.3 - 1.0 K/uL Corrected    CORRECTED RESULT; previously reported as 1.1 on 10/20/2022 at 03:07.    Eos # 10/20/2022 Test Not Performed  0.0 - 0.5 K/uL Corrected    CORRECTED RESULT; previously reported as 0.1 on 10/20/2022 at 03:07.    Baso # 10/20/2022 Test Not Performed  0.00 - 0.20 K/uL Corrected    CORRECTED RESULT; previously reported as 0.06 on 10/20/2022 at 03:07.    nRBC 10/20/2022 0  0 /100 WBC Final    Gran % 10/20/2022 66.0  38.0 - 73.0 % Corrected    CORRECTED RESULT; previously reported as 69.0 on 10/20/2022 at 03:07.    Lymph % 10/20/2022 23.0  18.0 - 48.0 % Corrected    CORRECTED RESULT; previously reported as 18.7 on 10/20/2022 at 03:07.    Mono % 10/20/2022 8.0  4.0 - 15.0 % Corrected    CORRECTED RESULT; previously reported as 10.1 on 10/20/2022 at 03:07.    Eosinophil % 10/20/2022 0.0  0.0 - 8.0 % Corrected    CORRECTED RESULT; previously reported as 0.8 on 10/20/2022 at 03:07.    Basophil % 10/20/2022 0.0  0.0 - 1.9 % Corrected    CORRECTED RESULT; previously reported as 0.5 on 10/20/2022 at 03:07.    Bands 10/20/2022 2.0  % Final    Metamyelocytes 10/20/2022 1.0  % Final    Platelet Estimate 10/20/2022 Decreased (A)   Final    Poik 10/20/2022 Slight   Final    Ovalocytes 10/20/2022 Occasional   Final    Tear Drop Cells 10/20/2022 Occasional   Final    Spherocytes 10/20/2022 Occasional   Final    Dohle Bodies 10/20/2022 Present   Final    Toxic Granulation 10/20/2022 Present   Final    Smudge Cells 10/20/2022 Present   Final    Comment: Smudge cells present;Substantial numbers may affect the   accuracy of the differential.      Large/Giant Platelets 10/20/2022 Present   Final    Differential Method 10/20/2022 Manual   Corrected    Comment: CORRECTED RESULT; previously reported as Automated on 10/20/2022 at   03:07.      POC Glucose  10/20/2022 126 (H)  70 - 110 mg/dL Final    POC BUN 10/20/2022 27  6 - 30 mg/dL Final    POC Creatinine 10/20/2022 1.1  0.5 - 1.4 mg/dL Final    POC Sodium 10/20/2022 138  136 - 145 mmol/L Final    POC Potassium 10/20/2022 5.1  3.5 - 5.1 mmol/L Final    POC Chloride 10/20/2022 101  95 - 110 mmol/L Final    POC TCO2 (MEASURED) 10/20/2022 32 (H)  23 - 29 mmol/L Final    POC Ionized Calcium 10/20/2022 1.01 (L)  1.06 - 1.42 mmol/L Final    POC Hematocrit 10/20/2022 56 (H)  36 - 54 %PCV Final    Sample 10/20/2022 CAROL   Final    Influenza A, Molecular 10/20/2022 Negative  Negative Final    Influenza B, Molecular 10/20/2022 Negative  Negative Final    Flu A & B Source 10/20/2022 Nasal swab   Final    SARS-CoV-2 RNA, Amplification, Qual 10/20/2022 Negative  Negative Final    Comment: This test utilizes isothermal nucleic acid amplification technology   to   detect the SARS-CoV-2 RdRp nucleic acid segment. The analytical   sensitivity   (limit of detection) is 500 copies/swab.     A POSITIVE result is indicative of the presence of SARS-CoV-2 RNA;   clinical   correlation with patient history and other diagnostic information is   necessary to determine patient infection status.    A NEGATIVE result means that SARS-CoV-2 nucleic acids are not present   above   the limit of detection. A NEGATIVE result should be treated as   presumptive.   It does not rule out the possibility of COVID-19 and should not be   the sole   basis for treatment decisions. If COVID-19 is strongly suspected   based on   clinical and exposure history, re-testing using an alternate   molecular assay   should be considered.     This test is only for use under the Food and Drug Administration s   Emergency   Use Authorization (EUA).     Commercial kits are provided by [employer]. Performanc                           e   characteristics of the EUA have been independently verified by   [facility] Department of Pathology and Laboratory  Medicine.   _________________________________________________________________   The authorized Fact Sheet for Healthcare Providers and the authorized   Fact   Sheet for Patients of the ID NOW COVID-19 are available on the FDA   website:   https://www.fda.gov/media/091449/download   https://www.fda.gov/media/609017/download      Sodium 10/20/2022 137  136 - 145 mmol/L Final    Potassium 10/20/2022 4.5  3.5 - 5.1 mmol/L Final    Chloride 10/20/2022 97  95 - 110 mmol/L Final    CO2 10/20/2022 29  23 - 29 mmol/L Final    Glucose 10/20/2022 126 (H)  70 - 110 mg/dL Final    BUN 10/20/2022 21  8 - 23 mg/dL Final    Creatinine 10/20/2022 1.3  0.5 - 1.4 mg/dL Final    Calcium 10/20/2022 9.6  8.7 - 10.5 mg/dL Final    Total Protein 10/20/2022 7.5  6.0 - 8.4 g/dL Final    Albumin 10/20/2022 4.1  3.5 - 5.2 g/dL Final    Total Bilirubin 10/20/2022 0.9  0.1 - 1.0 mg/dL Final    Comment: For infants and newborns, interpretation of results should be based  on gestational age, weight and in agreement with clinical  observations.    Premature Infant recommended reference ranges:  Up to 24 hours.............<8.0 mg/dL  Up to 48 hours............<12.0 mg/dL  3-5 days..................<15.0 mg/dL  6-29 days.................<15.0 mg/dL      Alkaline Phosphatase 10/20/2022 113  55 - 135 U/L Final    AST 10/20/2022 28  10 - 40 U/L Final    ALT 10/20/2022 47 (H)  10 - 44 U/L Final    Anion Gap 10/20/2022 11  8 - 16 mmol/L Final    eGFR 10/20/2022 59.5 (A)  >60 mL/min/1.73 m^2 Final    Lipase 10/20/2022 6  4 - 60 U/L Final    Troponin I 10/20/2022 0.016  0.000 - 0.026 ng/mL Final    Comment: The reference interval for Troponin I represents the 99th percentile   cutoff   for our facility and is consistent with 3rd generation assay   performance.     Lab Visit on 08/16/2022   Component Date Value Ref Range Status    WBC 08/16/2022 9.53  3.90 - 12.70 K/uL Final    RBC 08/16/2022 5.45  4.60 - 6.20 M/uL Final    Hemoglobin 08/16/2022 18.8 (H)   14.0 - 18.0 g/dL Final    Hematocrit 08/16/2022 55.3 (H)  40.0 - 54.0 % Final    MCV 08/16/2022 102 (H)  82 - 98 fL Final    MCH 08/16/2022 34.5 (H)  27.0 - 31.0 pg Final    MCHC 08/16/2022 34.0  32.0 - 36.0 g/dL Final    RDW 08/16/2022 12.4  11.5 - 14.5 % Final    Platelets 08/16/2022 173  150 - 450 K/uL Final    MPV 08/16/2022 11.9  9.2 - 12.9 fL Final    Immature Granulocytes 08/16/2022 0.6 (H)  0.0 - 0.5 % Final    Gran # (ANC) 08/16/2022 8.0 (H)  1.8 - 7.7 K/uL Final    Immature Grans (Abs) 08/16/2022 0.06 (H)  0.00 - 0.04 K/uL Final    Comment: Mild elevation in immature granulocytes is non specific and   can be seen in a variety of conditions including stress response,   acute inflammation, trauma and pregnancy. Correlation with other   laboratory and clinical findings is essential.      Lymph # 08/16/2022 1.1  1.0 - 4.8 K/uL Final    Mono # 08/16/2022 0.3  0.3 - 1.0 K/uL Final    Eos # 08/16/2022 0.1  0.0 - 0.5 K/uL Final    Baso # 08/16/2022 0.05  0.00 - 0.20 K/uL Final    nRBC 08/16/2022 0  0 /100 WBC Final    Gran % 08/16/2022 83.8 (H)  38.0 - 73.0 % Final    Lymph % 08/16/2022 11.1 (L)  18.0 - 48.0 % Final    Mono % 08/16/2022 3.5 (L)  4.0 - 15.0 % Final    Eosinophil % 08/16/2022 0.5  0.0 - 8.0 % Final    Basophil % 08/16/2022 0.5  0.0 - 1.9 % Final    Differential Method 08/16/2022 Automated   Final    Prothrombin Time 08/16/2022 10.7  9.0 - 12.5 sec Final    INR 08/16/2022 1.0  0.8 - 1.2 Final    Comment: Coumadin Therapy:  2.0 - 3.0 for INR for all indicators except mechanical heart valves  and antiphospholipid syndromes which should use 2.5 - 3.5.  LOT^040^PT Inn^[id number]     Lab Visit on 07/21/2022   Component Date Value Ref Range Status    Sodium 07/21/2022 140  136 - 145 mmol/L Final    Potassium 07/21/2022 4.3  3.5 - 5.1 mmol/L Final    Chloride 07/21/2022 103  95 - 110 mmol/L Final    CO2 07/21/2022 25  23 - 29 mmol/L Final    Glucose 07/21/2022 126 (H)  70 - 110 mg/dL Final    BUN 07/21/2022 13   8 - 23 mg/dL Final    Creatinine 07/21/2022 1.1  0.5 - 1.4 mg/dL Final    Calcium 07/21/2022 9.4  8.7 - 10.5 mg/dL Final    Total Protein 07/21/2022 7.0  6.0 - 8.4 g/dL Final    Albumin 07/21/2022 3.8  3.5 - 5.2 g/dL Final    Total Bilirubin 07/21/2022 0.6  0.1 - 1.0 mg/dL Final    Comment: For infants and newborns, interpretation of results should be based  on gestational age, weight and in agreement with clinical  observations.    Premature Infant recommended reference ranges:  Up to 24 hours.............<8.0 mg/dL  Up to 48 hours............<12.0 mg/dL  3-5 days..................<15.0 mg/dL  6-29 days.................<15.0 mg/dL      Alkaline Phosphatase 07/21/2022 61  55 - 135 U/L Final    AST 07/21/2022 21  10 - 40 U/L Final    ALT 07/21/2022 31  10 - 44 U/L Final    Anion Gap 07/21/2022 12  8 - 16 mmol/L Final    eGFR if African American 07/21/2022 >60  >60 mL/min/1.73 m^2 Final    eGFR if non African American 07/21/2022 >60  >60 mL/min/1.73 m^2 Final    Comment: Calculation used to obtain the estimated glomerular filtration  rate (eGFR) is the CKD-EPI equation.       WBC 07/21/2022 7.04  3.90 - 12.70 K/uL Final    RBC 07/21/2022 5.37  4.60 - 6.20 M/uL Final    Hemoglobin 07/21/2022 18.1 (H)  14.0 - 18.0 g/dL Final    Hematocrit 07/21/2022 54.3 (H)  40.0 - 54.0 % Final    MCV 07/21/2022 101 (H)  82 - 98 fL Final    MCH 07/21/2022 33.7 (H)  27.0 - 31.0 pg Final    MCHC 07/21/2022 33.3  32.0 - 36.0 g/dL Final    RDW 07/21/2022 12.7  11.5 - 14.5 % Final    Platelets 07/21/2022 139 (L)  150 - 450 K/uL Final    MPV 07/21/2022 11.9  9.2 - 12.9 fL Final    Immature Granulocytes 07/21/2022 0.4  0.0 - 0.5 % Final    Gran # (ANC) 07/21/2022 3.7  1.8 - 7.7 K/uL Final    Immature Grans (Abs) 07/21/2022 0.03  0.00 - 0.04 K/uL Final    Comment: Mild elevation in immature granulocytes is non specific and   can be seen in a variety of conditions including stress response,   acute inflammation, trauma and pregnancy.  Correlation with other   laboratory and clinical findings is essential.      Lymph # 07/21/2022 2.3  1.0 - 4.8 K/uL Final    Mono # 07/21/2022 0.7  0.3 - 1.0 K/uL Final    Eos # 07/21/2022 0.2  0.0 - 0.5 K/uL Final    Baso # 07/21/2022 0.04  0.00 - 0.20 K/uL Final    nRBC 07/21/2022 0  0 /100 WBC Final    Gran % 07/21/2022 53.1  38.0 - 73.0 % Final    Lymph % 07/21/2022 33.2  18.0 - 48.0 % Final    Mono % 07/21/2022 9.7  4.0 - 15.0 % Final    Eosinophil % 07/21/2022 3.0  0.0 - 8.0 % Final    Basophil % 07/21/2022 0.6  0.0 - 1.9 % Final    Differential Method 07/21/2022 Automated   Final    CRP 07/21/2022 0.4  0.0 - 8.2 mg/L Final    Sed Rate 07/21/2022 2  0 - 10 mm/Hr Final   Lab Visit on 06/21/2022   Component Date Value Ref Range Status    Prolactin 06/21/2022 28.8 (H)  3.5 - 19.4 ng/mL Final    LH 06/21/2022 2.4  0.6 - 12.1 mIU/mL Final    Comment: Female Reference Ranges:  Follicular phase.............1.8-11.8 mIU/mL  Midcycle phase...............7.6-89.1 mIU/mL  Luteal phase.................0.6-14.0 mIU/mL  Post-menopausal without HRT..5.2-62.0 mIU/mL  Male Reference Interval......0.6-12.1 mIU/mL      Testosterone 06/21/2022 542  250 - 1100 ng/dL Final    Comment: Men with clinically significant hypogonadal symptoms  and testosterone values repeatedly in the range of the   200-300 ng/dL or less, may benefit from testosterone   treatment after adequate risk and benefits counseling.    For additional information, please refer to   [email or web address] [email or web address]/TotalTestosteroneLCMSMS  (This link is being provided for informational/  educational purposes only.)    This test was developed and its analytical performance   characteristics have been determined by [employer]. It has not been cleared or approved by the  FDA. This assay has been validated pursuant to the CLIA   regulations and is used for clinical purposes.    TEST PERFORMED AT:  [facility]  [id number] [place] [address]  CE SOLO MD      Testosterone, Free 06/21/2022 33.8 (L)  46.0 - 224.0 pg/mL Final    Testosterone, Bioavailable 06/21/2022 63.7 (L)  110.0 - 575.0 ng/dL Final    Sex Hormone Binding Globulin 06/21/2022 79 (H)  22 - 77 nmol/L Final    Albumin 06/21/2022 4.1  3.6 - 5.1 g/dL Final    Follicle Stimulating Hormone 06/21/2022 5.93  0.95 - 11.95 mIU/mL Final    Comment: Female Reference Ranges:  Follicular Phase.................3.03-8.08 mIU/mL  Midcycle Peak....................2.55-16.69 mIU/mL  Luteal Phase.....................1.38-5.47 mIU/mL  Postmenopausal...................26..41 mIU/mL  Male Reference Range:............0.95-11.95 mIU/mL      WBC 06/21/2022 9.16  3.90 - 12.70 K/uL Final    RBC 06/21/2022 5.24  4.60 - 6.20 M/uL Final    Hemoglobin 06/21/2022 17.9  14.0 - 18.0 g/dL Final    Hematocrit 06/21/2022 54.1 (H)  40.0 - 54.0 % Final    MCV 06/21/2022 103 (H)  82 - 98 fL Final    MCH 06/21/2022 34.2 (H)  27.0 - 31.0 pg Final    MCHC 06/21/2022 33.1  32.0 - 36.0 g/dL Final    RDW 06/21/2022 12.6  11.5 - 14.5 % Final    Platelets 06/21/2022 202  150 - 450 K/uL Final    MPV 06/21/2022 11.3  9.2 - 12.9 fL Final    Immature Granulocytes 06/21/2022 0.8 (H)  0.0 - 0.5 % Final    Gran # (ANC) 06/21/2022 5.0  1.8 - 7.7 K/uL Final    Immature Grans (Abs) 06/21/2022 0.07 (H)  0.00 - 0.04 K/uL Final    Comment: Mild elevation in immature granulocytes is non specific and   can be seen in a variety of conditions including stress response,   acute inflammation, trauma and pregnancy. Correlation with other   laboratory and clinical findings is essential.      Lymph # 06/21/2022 3.1  1.0 - 4.8 K/uL Final    Mono # 06/21/2022 0.7  0.3 - 1.0 K/uL Final    Eos # 06/21/2022 0.2  0.0 - 0.5 K/uL Final    Baso # 06/21/2022 0.07  0.00 - 0.20 K/uL Final    nRBC 06/21/2022 0  0 /100 WBC Final    Gran % 06/21/2022 54.0  38.0 - 73.0 % Final    Lymph % 06/21/2022 34.0  18.0 - 48.0 % Final    Mono %  06/21/2022 8.0  4.0 - 15.0 % Final    Eosinophil % 06/21/2022 2.4  0.0 - 8.0 % Final    Basophil % 06/21/2022 0.8  0.0 - 1.9 % Final    Differential Method 06/21/2022 Automated   Final    PSA, Screen 06/21/2022 0.18  0.00 - 4.00 ng/mL Final    Comment: The testing method is a chemiluminescent microparticle immunoassay   manufactured by [employer] and performed on the [employer]   or   [employer] system. Values obtained with different assay manufacturers   for   methods may be different and cannot be used interchangeably.  PSA Expected levels:  Hormonal Therapy: <0.05 ng/ml  Prostatectomy: <0.01 ng/ml  Radiation Therapy: <1.00 ng/ml     Lab Visit on 05/30/2022   Component Date Value Ref Range Status    Sodium 05/30/2022 139  136 - 145 mmol/L Final    Potassium 05/30/2022 4.6  3.5 - 5.1 mmol/L Final    Specimen slightly hemolyzed    Chloride 05/30/2022 104  95 - 110 mmol/L Final    CO2 05/30/2022 25  23 - 29 mmol/L Final    Glucose 05/30/2022 173 (H)  70 - 110 mg/dL Final    BUN 05/30/2022 17  8 - 23 mg/dL Final    Creatinine 05/30/2022 1.1  0.5 - 1.4 mg/dL Final    Calcium 05/30/2022 9.5  8.7 - 10.5 mg/dL Final    Total Protein 05/30/2022 6.9  6.0 - 8.4 g/dL Final    Albumin 05/30/2022 3.6  3.5 - 5.2 g/dL Final    Total Bilirubin 05/30/2022 0.3  0.1 - 1.0 mg/dL Final    Comment: For infants and newborns, interpretation of results should be based  on gestational age, weight and in agreement with clinical  observations.    Premature Infant recommended reference ranges:  Up to 24 hours.............<8.0 mg/dL  Up to 48 hours............<12.0 mg/dL  3-5 days..................<15.0 mg/dL  6-29 days.................<15.0 mg/dL      Alkaline Phosphatase 05/30/2022 68  55 - 135 U/L Final    AST 05/30/2022 20  10 - 40 U/L Final    ALT 05/30/2022 31  10 - 44 U/L Final    Anion Gap 05/30/2022 10  8 - 16 mmol/L Final    eGFR if African American 05/30/2022 >60  >60 mL/min/1.73 m^2 Final    eGFR if non African American  05/30/2022 >60  >60 mL/min/1.73 m^2 Final    Comment: Calculation used to obtain the estimated glomerular filtration  rate (eGFR) is the CKD-EPI equation.       CPK 05/30/2022 68  20 - 200 U/L Final       Imaging  X-Ray Chest 1 View    Result Date: 10/20/2022  EXAMINATION: XR CHEST 1 VIEW CLINICAL HISTORY: Heart failure, unspecified TECHNIQUE: Single frontal view of the chest was performed. COMPARISON: 08/20/2021. FINDINGS: Chronic opacities in the lungs, right more than left, similar to prior. Heart and lungs  appear unchanged when allowing for differences in technique and positioning.     Chronic opacities in the lungs, right more than left, similar to prior. No significant change from prior study. Electronically signed by: Jj Ortez MD Date:    10/20/2022 Time:    03:04    X-Ray Chest PA And Lateral    Result Date: 10/26/2022  EXAMINATION: XR CHEST PA AND LATERAL CLINICAL HISTORY: Cough, unspecified TECHNIQUE: PA and lateral views of the chest were performed. COMPARISON: Chest radiographs 10/20/2022, 08/20/2021 FINDINGS: Cardiac monitoring leads overlie the chest.  Cardiac silhouette is stable in size.  There is atherosclerotic calcification of the thoracic aorta.  Lungs demonstrate stable bibasilar opacities, similar to prior examination.  No new large confluent airspace consolidation identified.  No significant volume of pleural fluid or pneumothorax identified.  There are several chronic remote right-sided rib deformities again demonstrated.     No definite radiographic evidence of acute intrathoracic process.  Chronic bibasilar subsegmental opacities. Electronically signed by: Darek Anderson MD Date:    10/26/2022 Time:    23:39    X-Ray Thoracic Spine AP Lateral    Result Date: 10/12/2022  EXAMINATION: XR THORACIC SPINE AP LATERAL CLINICAL HISTORY: Pain in thoracic spine TECHNIQUE: AP and lateral views of the thoracic spine were performed. COMPARISON: None FINDINGS: Posterior vertebral alignment is  satisfactory.  There appears to be a depression deformity involving the superior endplate of T11.  The remainder of the vertebral body heights are well maintained.  There are degenerative changes with anterior osteophytes noted most prominently within the lower thoracic spine.  No abnormal paraspinal masses are identified.     Probable depression deformity involving the superior endplate of T11. Thoracic spondylosis. Electronically signed by: Prakash Adkins MD Date:    10/12/2022 Time:    07:57    CT Abdomen Pelvis With Contrast    Result Date: 10/20/2022  EXAMINATION: CT ABDOMEN PELVIS WITH CONTRAST CLINICAL HISTORY: Bowel obstruction suspected;Abdominal pain, acute, nonlocalized; TECHNIQUE: Axial images of the abdomen and pelvis were acquired after the use of 100 cc [id number] IV contrast.  Coronal and sagittal reconstructions were also obtained COMPARISON: CT abdomen with contrast 01/24/2022 FINDINGS: Lower thorax: Heart: Normal in size. No pericardial effusion. Multivessel calcific atherosclerosis of the coronaries with stenting. Deisy/Mediastinum: No significant mediastinal, hilar, or axillary lymphadenopathy Lungs: Trachea and bronchi are patent.  Partially visualized ground-glass opacities with interlobular thickening of the right middle lobe, suggesting infection or inflammation.  Right lung base linear opacity of subsegmental atelectasis versus scarring.  No pleural effusion. Liver: Normal in size and contour.  No focal hepatic lesion. Gallbladder: No calcified gallstones. Bile Ducts: No evidence of dilated ducts. Pancreas: No mass or peripancreatic fat stranding. Spleen: Unremarkable. Stomach and duodenum: Unremarkable. Adrenals: Nodular adrenal thickening, left more than right, similar to prior. Kidneys/ Ureters: Normal in size and location. Normal enhancement. Bilateral nonobstructive nephrolithiasis.  No ureteral dilatation.  Right 1.8 cm renal cyst.  Stable bilateral nonspecific perinephric fat stranding  Bladder: No evidence of wall thickening. Reproductive organs: Unremarkable. GI/Mesentery: Small bowel is normal in caliber with no evidence of obstruction.  No evidence of inflammation or wall thickening. Appendix is identified and is unremarkable.  Diffuse large bowel dilatation measuring 9 cm without transition point.  Stool is noted throughout the large bowel.  No free air.  No ascites. Lymph nodes: No lymphadenapathy. Abdominal wall: Metal clips right inguinal region. Vasculature: No aneurysm. Moderate calcific atherosclerosis. Bones: Degenerative changes.  No acute fracture. No suspicious osseous lesions.     Diffuse dilatation of stool filled large bowel without evidence of transition point, likely constipation.  Correlate clinically. Bilateral nonobstructive nephrolithiasis. Partially visual right middle lobe patchy ground-glass opacity, recommend correlation with underlying infection, inflammation or aspiration. Additional findings as above. Electronically signed by resident: Evan Bhatt Date:    10/20/2022 Time:    02:59 Electronically signed by: Jj Ortez MD Date:    10/20/2022 Time:    03:47    Echo    Result Date: 10/28/2022  · The left ventricle is normal in size with concentric hypertrophy and normal systolic function. · Grade I left ventricular diastolic dysfunction. · Normal right ventricular size with normal right ventricular systolic function. · Thickened and calcified posterior mitral valve leaflet.        Assessment  1. Chronic systolic heart failure  Compensated.  Most recent echocardiogram shows improvement in left ventricular systolic function.    2. Atherosclerosis of native coronary artery of native heart without angina pectoris  Stable and free of angina after right coronary intervention    3. Essential hypertension  Controlled    4. Chronic obstructive pulmonary disease, unspecified COPD type  Seems to be a baseline.  Likely contributing to any supraventricular arrhythmias.    5.  Type 2 diabetes mellitus with other circulatory complication, with long-term current use of insulin  Followed by Endocrinology    6. Severe obesity  Unchanged    7. Atherosclerotic peripheral vascular disease  Asymptomatic    8. SVT  See below    Plan and Discussion    Echocardiogram shows some thickening of his posterior mitral valve leaflet.  It is asymptomatic.  No significant mitral regurgitation on echo or by exam.  Would not workup further at this point.  SVT may have been exacerbated by his acute respiratory illness.  He is a poor candidate for any type of invasive procedures.  If it recurs when he is at baseline, would consider AV shaylee blocking agent, perhaps digoxin or a low dose of a non-dihydropyridine calcium channel blocker.    The 10-year ASCVD risk score (Rosalie DK, et al., 2019) is: 28.8%    Values used to calculate the score:      Age: 67 years      Sex: Male      Is Non- : No      Diabetic: Yes      Tobacco smoker: Yes      Systolic Blood Pressure: 108 mmHg      Is BP treated: Yes      HDL Cholesterol: 58 mg/dL      Total Cholesterol: 178 mg/dL     Follow Up  Follow up in about 3 months (around 2/2/2023).      Shemar Dempsey MD

## 2022-11-02 NOTE — PROGRESS NOTES
Diabetes Care Specialist Progress Note  Author: Marisela Kee RD  Date: 11/2/2022    Program Intake  Reason for Diabetes Program Visit:: Intervention  Type of Intervention:: Individual  Individual: Education  Education: Self-Management Skill Review, Pattern Management, Nutrition and Meal Planning  Current diabetes risk level:: moderate    Lab Results   Component Value Date    HGBA1C 7.5 (H) 04/22/2022       Diabetes Self-Management Skills Assessment    Diabetes Disease Process/Treatment Options  Patient/caregiver able to state what happens when someone has diabetes.: yes  Patient/caregiver knows what type of diabetes they have.: yes  Diabetes Type : Type II  Patient/caregiver able to identify at least three signs and symptoms of diabetes.: yes  Patient able to identify at least three risk factors for diabetes.: yes  Identified risk factors:: being overweight, age over 40 (prednisone use)  Diabetes Disease Process/Treatment Options: Skills Assessment Completed: Yes  Assessment indicates:: Adequate understanding  Area of need?: No    Nutrition/Healthy Eating  Challenges to healthy eating:: portion control  Method of carbohydrate measurement:: carb counting/reading labels  Patient can identify foods that impact blood sugar.: yes  Patient-identified foods:: fruit/fruit juice, soda, starchy vegetables (corn, peas, beans), sweets, starches (bread, pasta, rice, cereal), milk, yogurt  Nutrition/Healthy Eating Skills Assessment Completed:: Yes  Assessment indicates:: Adequate understanding  Area of need?: No    Physical Activity/Exercise  Physical Activity/Exercise Skills Assessment Completed: : No  Deffered due to:: Time    Medications  Patient is able to describe current diabetes management routine.: yes  Diabetes management routine:: diet, injectable medications, insulin  Patient is able to identify current diabetes medications, dosages, and appropriate timing of medications.: yes  Patient understands the purpose of  the medications taken for diabetes.: yes  Patient reports problems or concerns with current medication regimen.: yes  Medication regimen problems/concerns:: does not feel like regimen is working  Medication Skills Assessment Completed:: Yes  Assessment indicates:: Knowledge deficit, Instruction Needed  Area of need?: Yes    Home Blood Glucose Monitoring  Patient states that blood sugar is checked at home daily.: yes  Monitoring Method:: personal continuous glucose monitor  Personal CGM type:: Dexcom  Patient is able to use personal CGM appropriately.: yes  CGM Report reviewed?: yes  Home Blood Glucose Monitoring Skills Assessment Completed: : Yes  Assessment indicates:: Adequate understanding  Area of need?: No    Acute Complications  Acute Complications Skills Assessment Completed: : No  Deffered due to:: Time    Chronic Complications  Patient can identify major chronic complications of diabetes.: yes  Stated chronic complications:: heart disease/heart attack, kidney disease, retinopathy, neuropathy/nerve damage  Patient can identify ways to prevent or delay diabetes complications.: yes  Stated ways to prevent complications:: controlling cholesterol and triglycerides, healthy eating and regular activity, maintaining optimal blood glucose control  Patient is aware that having diabetes increases risk of heart disease?: Yes  Patient is aware that heart disease is the leading cause of death and disability in people with diabetes?: Yes  Patient able to state risk factors for heart disease?: Yes  Chronic Complications Skills Assessment Completed: : Yes  Assessment indicates:: Adequate understanding  Area of need?: No    Psychosocial/Coping  Psychosocial/Coping Skills Assessment Completed: : No  Deffered due to:: Time    Assessment Summary and Plan    Based on today's diabetes care assessment, the following areas of need were identified:      Social 4/26/2022   Access to Mass Media/Tech No   Cognitive/Behavioral Health No    Culture/Sabianist No   Communication No   Health Literacy No        Clinical 3/15/2022   Nutritional Status No        Diabetes Self-Management Skills 11/1/2022   Diabetes Disease Process/Treatment Options No   Nutrition/Healthy Eating No   Physical Activity/Exercise -   Medication Yes - see care planning   Home Blood Glucose Monitoring No   Acute Complications -   Chronic Complications No   Psychosocial/Coping -          Today's interventions were provided through individual discussion, instruction, and written materials were provided.      Patient verbalized understanding of instruction and written materials.  Pt was able to return back demonstration of instructions today. Patient understood key points, needs reinforcement and further instruction.     Diabetes Self-Management Care Plan:    Today's Diabetes Self-Management Care Plan was developed with Nithin's input. Nithin has agreed to work toward the following goal(s) to improve his/her overall diabetes control.      Care Plan: Diabetes Management   Updates made since 10/3/2022 12:00 AM        Problem: Healthy Eating         Goal: Pt will reduce sodium in diet to 1000mg per day or less.    Start Date: 4/26/2022   Expected End Date: 5/27/2022   This Visit's Progress: Deferred   Recent Progress: On track   Priority: High   Barriers: No Barriers Identified   Note:    Pt ate crawfish and had HF exacerbation/fluid overload. Still recovering. Some pedal edema remaining, increased trouble walking. Stressed importance of following low sodium diet.        Problem: Medications         Goal: Pt will discuss w/ endo about pump options d/t high insulin needs.    Start Date: 9/20/2022   Expected End Date: 10/22/2022   This Visit's Progress: On track   Priority: High   Barriers: No Barriers Identified   Note:    11/1/22 - Pt came for pump eval appt today. Pt has already been counting carbohydrates for inPen use. He is very knowledgeable of looking up CHO on databases/apps and  prefers to get a very accurate CHO count. Discussed estimating CHO using 15g servings for foods that do not have a label. Pt had questions about glycemic index and using it to adjust CHO grams. Explained looking at dietary fiber and subtracting from total CHO if >5g d/t slowed absorption of CHO with high fiber foods. Pt verbalized he is most interested in OmniPod 5. Provided education on insulin pump delivery and basic overview of OmniPod 5 system.         Follow Up Plan     Follow up in about 4 weeks (around 11/29/2022) for follow-up/possible pump start - pt to call or message when ready .    Today's care plan and follow up schedule was discussed with patient.  Nithin verbalized understanding of the care plan, goals, and agrees to follow up plan.        The patient was encouraged to communicate with his/her health care provider/physician and care team regarding his/her condition(s) and treatment.  I provided the patient with my contact information today and encouraged to contact me via phone or [facility] Patient Portal as needed.     Length of Visit   Total Time: 60 Minutes

## 2022-11-03 ENCOUNTER — PATIENT MESSAGE (OUTPATIENT)
Dept: DIABETES | Facility: CLINIC | Age: 67
End: 2022-11-03
Payer: MEDICARE

## 2022-11-03 DIAGNOSIS — Z79.4 TYPE 2 DIABETES MELLITUS WITH OTHER SPECIFIED COMPLICATION, WITH LONG-TERM CURRENT USE OF INSULIN: Primary | ICD-10-CM

## 2022-11-03 DIAGNOSIS — E11.69 TYPE 2 DIABETES MELLITUS WITH OTHER SPECIFIED COMPLICATION, WITH LONG-TERM CURRENT USE OF INSULIN: Primary | ICD-10-CM

## 2022-11-03 RX ORDER — DIGOXIN 125 MCG
125 TABLET ORAL DAILY
Qty: 90 TABLET | Refills: 3 | Status: SHIPPED | OUTPATIENT
Start: 2022-11-03 | End: 2023-11-06

## 2022-11-10 ENCOUNTER — PATIENT MESSAGE (OUTPATIENT)
Dept: CARDIOLOGY | Facility: CLINIC | Age: 67
End: 2022-11-10
Payer: MEDICARE

## 2022-11-14 ENCOUNTER — OFFICE VISIT (OUTPATIENT)
Dept: URGENT CARE | Facility: CLINIC | Age: 67
End: 2022-11-14
Payer: MEDICARE

## 2022-11-14 VITALS
TEMPERATURE: 98 F | OXYGEN SATURATION: 92 % | SYSTOLIC BLOOD PRESSURE: 128 MMHG | HEART RATE: 92 BPM | WEIGHT: 242.63 LBS | RESPIRATION RATE: 14 BRPM | HEIGHT: 71 IN | BODY MASS INDEX: 33.97 KG/M2 | DIASTOLIC BLOOD PRESSURE: 80 MMHG

## 2022-11-14 DIAGNOSIS — Z79.4 TYPE 2 DIABETES MELLITUS WITH HYPERGLYCEMIA, WITH LONG-TERM CURRENT USE OF INSULIN: ICD-10-CM

## 2022-11-14 DIAGNOSIS — F17.200 SMOKER UNMOTIVATED TO QUIT: ICD-10-CM

## 2022-11-14 DIAGNOSIS — E11.65 TYPE 2 DIABETES MELLITUS WITH HYPERGLYCEMIA, WITH LONG-TERM CURRENT USE OF INSULIN: ICD-10-CM

## 2022-11-14 DIAGNOSIS — M06.9 RHEUMATOID ARTHRITIS FLARE: ICD-10-CM

## 2022-11-14 DIAGNOSIS — L02.215 ABSCESS OF MULTIPLE SITES OF PERINEUM: Primary | ICD-10-CM

## 2022-11-14 PROCEDURE — 99214 PR OFFICE/OUTPT VISIT, EST, LEVL IV, 30-39 MIN: ICD-10-PCS | Mod: S$GLB,,, | Performed by: SURGERY

## 2022-11-14 PROCEDURE — 3079F PR MOST RECENT DIASTOLIC BLOOD PRESSURE 80-89 MM HG: ICD-10-PCS | Mod: CPTII,S$GLB,, | Performed by: SURGERY

## 2022-11-14 PROCEDURE — 1160F PR REVIEW ALL MEDS BY PRESCRIBER/CLIN PHARMACIST DOCUMENTED: ICD-10-PCS | Mod: CPTII,S$GLB,, | Performed by: SURGERY

## 2022-11-14 PROCEDURE — 4010F PR ACE/ARB THEARPY RXD/TAKEN: ICD-10-PCS | Mod: CPTII,S$GLB,, | Performed by: SURGERY

## 2022-11-14 PROCEDURE — 99499 RISK ADDL DX/OHS AUDIT: ICD-10-PCS | Mod: S$GLB,,, | Performed by: SURGERY

## 2022-11-14 PROCEDURE — 1160F RVW MEDS BY RX/DR IN RCRD: CPT | Mod: CPTII,S$GLB,, | Performed by: SURGERY

## 2022-11-14 PROCEDURE — 1159F MED LIST DOCD IN RCRD: CPT | Mod: CPTII,S$GLB,, | Performed by: SURGERY

## 2022-11-14 PROCEDURE — 3074F SYST BP LT 130 MM HG: CPT | Mod: CPTII,S$GLB,, | Performed by: SURGERY

## 2022-11-14 PROCEDURE — 1159F PR MEDICATION LIST DOCUMENTED IN MEDICAL RECORD: ICD-10-PCS | Mod: CPTII,S$GLB,, | Performed by: SURGERY

## 2022-11-14 PROCEDURE — 3051F HG A1C>EQUAL 7.0%<8.0%: CPT | Mod: CPTII,S$GLB,, | Performed by: SURGERY

## 2022-11-14 PROCEDURE — 1125F PR PAIN SEVERITY QUANTIFIED, PAIN PRESENT: ICD-10-PCS | Mod: CPTII,S$GLB,, | Performed by: SURGERY

## 2022-11-14 PROCEDURE — 3008F BODY MASS INDEX DOCD: CPT | Mod: CPTII,S$GLB,, | Performed by: SURGERY

## 2022-11-14 PROCEDURE — 3051F PR MOST RECENT HEMOGLOBIN A1C LEVEL 7.0 - < 8.0%: ICD-10-PCS | Mod: CPTII,S$GLB,, | Performed by: SURGERY

## 2022-11-14 PROCEDURE — 99214 OFFICE O/P EST MOD 30 MIN: CPT | Mod: S$GLB,,, | Performed by: SURGERY

## 2022-11-14 PROCEDURE — 1125F AMNT PAIN NOTED PAIN PRSNT: CPT | Mod: CPTII,S$GLB,, | Performed by: SURGERY

## 2022-11-14 PROCEDURE — 4010F ACE/ARB THERAPY RXD/TAKEN: CPT | Mod: CPTII,S$GLB,, | Performed by: SURGERY

## 2022-11-14 PROCEDURE — 99499 UNLISTED E&M SERVICE: CPT | Mod: S$GLB,,, | Performed by: SURGERY

## 2022-11-14 PROCEDURE — 3074F PR MOST RECENT SYSTOLIC BLOOD PRESSURE < 130 MM HG: ICD-10-PCS | Mod: CPTII,S$GLB,, | Performed by: SURGERY

## 2022-11-14 PROCEDURE — 3079F DIAST BP 80-89 MM HG: CPT | Mod: CPTII,S$GLB,, | Performed by: SURGERY

## 2022-11-14 PROCEDURE — 3008F PR BODY MASS INDEX (BMI) DOCUMENTED: ICD-10-PCS | Mod: CPTII,S$GLB,, | Performed by: SURGERY

## 2022-11-14 RX ORDER — IMIQUIMOD 12.5 MG/.25G
CREAM TOPICAL
Status: ON HOLD | COMMUNITY
Start: 2022-09-02 | End: 2023-01-18 | Stop reason: HOSPADM

## 2022-11-14 RX ORDER — MINOCYCLINE HYDROCHLORIDE 100 MG/1
100 CAPSULE ORAL 2 TIMES DAILY
Status: ON HOLD | COMMUNITY
Start: 2022-11-09 | End: 2023-01-18 | Stop reason: HOSPADM

## 2022-11-14 RX ORDER — LINACLOTIDE 72 UG/1
CAPSULE, GELATIN COATED ORAL
COMMUNITY
Start: 2022-11-11 | End: 2023-02-06 | Stop reason: SDUPTHER

## 2022-11-15 ENCOUNTER — ANESTHESIA (OUTPATIENT)
Dept: SURGERY | Facility: OTHER | Age: 67
DRG: 854 | End: 2022-11-15
Payer: MEDICARE

## 2022-11-15 ENCOUNTER — ANESTHESIA EVENT (OUTPATIENT)
Dept: SURGERY | Facility: OTHER | Age: 67
DRG: 854 | End: 2022-11-15
Payer: MEDICARE

## 2022-11-15 ENCOUNTER — HOSPITAL ENCOUNTER (INPATIENT)
Facility: OTHER | Age: 67
LOS: 16 days | Discharge: NURSING, REHAB OR LONG-TERM CARE | DRG: 854 | End: 2022-12-01
Attending: EMERGENCY MEDICINE | Admitting: EMERGENCY MEDICINE
Payer: MEDICARE

## 2022-11-15 DIAGNOSIS — I49.9 ARRHYTHMIA: ICD-10-CM

## 2022-11-15 DIAGNOSIS — R07.9 CHEST PAIN: ICD-10-CM

## 2022-11-15 DIAGNOSIS — I25.10 CORONARY ARTERY DISEASE, UNSPECIFIED VESSEL OR LESION TYPE, UNSPECIFIED WHETHER ANGINA PRESENT, UNSPECIFIED WHETHER NATIVE OR TRANSPLANTED HEART: ICD-10-CM

## 2022-11-15 DIAGNOSIS — I50.32 CHRONIC DIASTOLIC HEART FAILURE: ICD-10-CM

## 2022-11-15 DIAGNOSIS — N17.9 AKI (ACUTE KIDNEY INJURY): ICD-10-CM

## 2022-11-15 DIAGNOSIS — N49.3 FOURNIER'S GANGRENE: Primary | ICD-10-CM

## 2022-11-15 DIAGNOSIS — Z79.4 TYPE 2 DIABETES MELLITUS WITH HYPERGLYCEMIA, WITH LONG-TERM CURRENT USE OF INSULIN: ICD-10-CM

## 2022-11-15 DIAGNOSIS — A41.9 SEPSIS WITH ACUTE RENAL FAILURE WITHOUT SEPTIC SHOCK, DUE TO UNSPECIFIED ORGANISM, UNSPECIFIED ACUTE RENAL FAILURE TYPE: ICD-10-CM

## 2022-11-15 DIAGNOSIS — N17.9 SEPSIS WITH ACUTE RENAL FAILURE WITHOUT SEPTIC SHOCK, DUE TO UNSPECIFIED ORGANISM, UNSPECIFIED ACUTE RENAL FAILURE TYPE: ICD-10-CM

## 2022-11-15 DIAGNOSIS — R65.20 SEPSIS WITH ACUTE RENAL FAILURE WITHOUT SEPTIC SHOCK, DUE TO UNSPECIFIED ORGANISM, UNSPECIFIED ACUTE RENAL FAILURE TYPE: ICD-10-CM

## 2022-11-15 DIAGNOSIS — D72.829 LEUKOCYTOSIS, UNSPECIFIED TYPE: ICD-10-CM

## 2022-11-15 DIAGNOSIS — E11.65 TYPE 2 DIABETES MELLITUS WITH HYPERGLYCEMIA, WITH LONG-TERM CURRENT USE OF INSULIN: ICD-10-CM

## 2022-11-15 DIAGNOSIS — A41.9 SEPSIS: ICD-10-CM

## 2022-11-15 DIAGNOSIS — I10 ESSENTIAL HYPERTENSION: ICD-10-CM

## 2022-11-15 DIAGNOSIS — R00.0 TACHYCARDIA WITH HEART RATE 141-160 BEATS PER MINUTE: ICD-10-CM

## 2022-11-15 DIAGNOSIS — E87.20 LACTIC ACIDOSIS: ICD-10-CM

## 2022-11-15 PROBLEM — N50.89 SCROTAL EDEMA: Status: ACTIVE | Noted: 2022-11-15

## 2022-11-15 PROBLEM — E87.5 HYPERKALEMIA: Status: ACTIVE | Noted: 2022-11-15

## 2022-11-15 LAB
ALBUMIN SERPL BCP-MCNC: 2.1 G/DL (ref 3.5–5.2)
ALBUMIN SERPL BCP-MCNC: 2.6 G/DL (ref 3.5–5.2)
ALP SERPL-CCNC: 86 U/L (ref 55–135)
ALP SERPL-CCNC: 95 U/L (ref 55–135)
ALT SERPL W/O P-5'-P-CCNC: 54 U/L (ref 10–44)
ALT SERPL W/O P-5'-P-CCNC: 96 U/L (ref 10–44)
ANION GAP SERPL CALC-SCNC: 14 MMOL/L (ref 8–16)
ANION GAP SERPL CALC-SCNC: 8 MMOL/L (ref 8–16)
AST SERPL-CCNC: 115 U/L (ref 10–40)
AST SERPL-CCNC: 77 U/L (ref 10–40)
BASOPHILS # BLD AUTO: ABNORMAL K/UL (ref 0–0.2)
BASOPHILS # BLD AUTO: ABNORMAL K/UL (ref 0–0.2)
BASOPHILS NFR BLD: 0 % (ref 0–1.9)
BASOPHILS NFR BLD: 0 % (ref 0–1.9)
BILIRUB SERPL-MCNC: 0.9 MG/DL (ref 0.1–1)
BILIRUB SERPL-MCNC: 1.1 MG/DL (ref 0.1–1)
BNP SERPL-MCNC: 424 PG/ML (ref 0–99)
BUN SERPL-MCNC: 37 MG/DL (ref 8–23)
BUN SERPL-MCNC: 44 MG/DL (ref 8–23)
CALCIUM SERPL-MCNC: 8.4 MG/DL (ref 8.7–10.5)
CALCIUM SERPL-MCNC: 9.3 MG/DL (ref 8.7–10.5)
CHLORIDE SERPL-SCNC: 94 MMOL/L (ref 95–110)
CHLORIDE SERPL-SCNC: 98 MMOL/L (ref 95–110)
CO2 SERPL-SCNC: 23 MMOL/L (ref 23–29)
CO2 SERPL-SCNC: 25 MMOL/L (ref 23–29)
CREAT SERPL-MCNC: 1.5 MG/DL (ref 0.5–1.4)
CREAT SERPL-MCNC: 2.5 MG/DL (ref 0.5–1.4)
CREAT SERPL-MCNC: 2.5 MG/DL (ref 0.5–1.4)
DIFFERENTIAL METHOD: ABNORMAL
DIFFERENTIAL METHOD: ABNORMAL
DIGOXIN SERPL-MCNC: 0.4 NG/ML (ref 0.8–2)
EOSINOPHIL # BLD AUTO: ABNORMAL K/UL (ref 0–0.5)
EOSINOPHIL # BLD AUTO: ABNORMAL K/UL (ref 0–0.5)
EOSINOPHIL NFR BLD: 0 % (ref 0–8)
EOSINOPHIL NFR BLD: 0 % (ref 0–8)
ERYTHROCYTE [DISTWIDTH] IN BLOOD BY AUTOMATED COUNT: 12.9 % (ref 11.5–14.5)
ERYTHROCYTE [DISTWIDTH] IN BLOOD BY AUTOMATED COUNT: 13.1 % (ref 11.5–14.5)
EST. GFR  (NO RACE VARIABLE): 27 ML/MIN/1.73 M^2
EST. GFR  (NO RACE VARIABLE): 50 ML/MIN/1.73 M^2
GLUCOSE SERPL-MCNC: 183 MG/DL (ref 70–110)
GLUCOSE SERPL-MCNC: 255 MG/DL (ref 70–110)
HCT VFR BLD AUTO: 44.4 % (ref 40–54)
HCT VFR BLD AUTO: 49.1 % (ref 40–54)
HCV AB SERPL QL IA: NEGATIVE
HGB BLD-MCNC: 14.9 G/DL (ref 14–18)
HGB BLD-MCNC: 16.9 G/DL (ref 14–18)
HIV 1+2 AB+HIV1 P24 AG SERPL QL IA: NEGATIVE
IMM GRANULOCYTES # BLD AUTO: ABNORMAL K/UL (ref 0–0.04)
IMM GRANULOCYTES # BLD AUTO: ABNORMAL K/UL (ref 0–0.04)
IMM GRANULOCYTES NFR BLD AUTO: ABNORMAL % (ref 0–0.5)
IMM GRANULOCYTES NFR BLD AUTO: ABNORMAL % (ref 0–0.5)
LACTATE SERPL-SCNC: 2.2 MMOL/L (ref 0.5–2.2)
LACTATE SERPL-SCNC: 4.4 MMOL/L (ref 0.5–2.2)
LDH SERPL L TO P-CCNC: 5.63 MMOL/L (ref 0.5–2.2)
LYMPHOCYTES # BLD AUTO: ABNORMAL K/UL (ref 1–4.8)
LYMPHOCYTES # BLD AUTO: ABNORMAL K/UL (ref 1–4.8)
LYMPHOCYTES NFR BLD: 4 % (ref 18–48)
LYMPHOCYTES NFR BLD: 8 % (ref 18–48)
MAGNESIUM SERPL-MCNC: 2 MG/DL (ref 1.6–2.6)
MCH RBC QN AUTO: 33.6 PG (ref 27–31)
MCH RBC QN AUTO: 34.1 PG (ref 27–31)
MCHC RBC AUTO-ENTMCNC: 33.6 G/DL (ref 32–36)
MCHC RBC AUTO-ENTMCNC: 34.4 G/DL (ref 32–36)
MCV RBC AUTO: 100 FL (ref 82–98)
MCV RBC AUTO: 99 FL (ref 82–98)
METAMYELOCYTES NFR BLD MANUAL: 1 %
MONOCYTES # BLD AUTO: ABNORMAL K/UL (ref 0.3–1)
MONOCYTES # BLD AUTO: ABNORMAL K/UL (ref 0.3–1)
MONOCYTES NFR BLD: 10 % (ref 4–15)
MONOCYTES NFR BLD: 7 % (ref 4–15)
NEUTROPHILS NFR BLD: 69 % (ref 38–73)
NEUTROPHILS NFR BLD: 76 % (ref 38–73)
NEUTS BAND NFR BLD MANUAL: 19 %
NEUTS BAND NFR BLD MANUAL: 6 %
NRBC BLD-RTO: 0 /100 WBC
NRBC BLD-RTO: 0 /100 WBC
PHOSPHATE SERPL-MCNC: 4.6 MG/DL (ref 2.7–4.5)
PLATELET # BLD AUTO: 188 K/UL (ref 150–450)
PLATELET # BLD AUTO: 230 K/UL (ref 150–450)
PLATELET BLD QL SMEAR: ABNORMAL
PLATELET BLD QL SMEAR: ABNORMAL
PMV BLD AUTO: 11.4 FL (ref 9.2–12.9)
PMV BLD AUTO: 11.5 FL (ref 9.2–12.9)
POCT GLUCOSE: 179 MG/DL (ref 70–110)
POCT GLUCOSE: 250 MG/DL (ref 70–110)
POTASSIUM SERPL-SCNC: 5 MMOL/L (ref 3.5–5.1)
POTASSIUM SERPL-SCNC: 6.1 MMOL/L (ref 3.5–5.1)
PROCALCITONIN SERPL IA-MCNC: 48.84 NG/ML
PROT SERPL-MCNC: 5.3 G/DL (ref 6–8.4)
PROT SERPL-MCNC: 6.5 G/DL (ref 6–8.4)
RBC # BLD AUTO: 4.43 M/UL (ref 4.6–6.2)
RBC # BLD AUTO: 4.96 M/UL (ref 4.6–6.2)
SAMPLE: ABNORMAL
SAMPLE: ABNORMAL
SODIUM SERPL-SCNC: 131 MMOL/L (ref 136–145)
SODIUM SERPL-SCNC: 131 MMOL/L (ref 136–145)
TROPONIN I SERPL DL<=0.01 NG/ML-MCNC: 0.46 NG/ML (ref 0–0.03)
TROPONIN I SERPL DL<=0.01 NG/ML-MCNC: 0.55 NG/ML (ref 0–0.03)
WBC # BLD AUTO: 21.85 K/UL (ref 3.9–12.7)
WBC # BLD AUTO: 22.42 K/UL (ref 3.9–12.7)

## 2022-11-15 PROCEDURE — 96374 THER/PROPH/DIAG INJ IV PUSH: CPT | Mod: 59

## 2022-11-15 PROCEDURE — 82962 GLUCOSE BLOOD TEST: CPT

## 2022-11-15 PROCEDURE — S0030 INJECTION, METRONIDAZOLE: HCPCS | Performed by: NURSE PRACTITIONER

## 2022-11-15 PROCEDURE — 63600175 PHARM REV CODE 636 W HCPCS: Performed by: PHYSICIAN ASSISTANT

## 2022-11-15 PROCEDURE — 99284 PR EMERGENCY DEPT VISIT,LEVEL IV: ICD-10-PCS | Mod: 25,,, | Performed by: UROLOGY

## 2022-11-15 PROCEDURE — 27201423 OPTIME MED/SURG SUP & DEVICES STERILE SUPPLY: Performed by: UROLOGY

## 2022-11-15 PROCEDURE — 86803 HEPATITIS C AB TEST: CPT | Performed by: EMERGENCY MEDICINE

## 2022-11-15 PROCEDURE — 63600175 PHARM REV CODE 636 W HCPCS: Performed by: NURSE PRACTITIONER

## 2022-11-15 PROCEDURE — 25000003 PHARM REV CODE 250: Performed by: PHYSICIAN ASSISTANT

## 2022-11-15 PROCEDURE — 99900035 HC TECH TIME PER 15 MIN (STAT)

## 2022-11-15 PROCEDURE — 84145 PROCALCITONIN (PCT): CPT | Performed by: PHYSICIAN ASSISTANT

## 2022-11-15 PROCEDURE — 63600175 PHARM REV CODE 636 W HCPCS: Performed by: STUDENT IN AN ORGANIZED HEALTH CARE EDUCATION/TRAINING PROGRAM

## 2022-11-15 PROCEDURE — 83605 ASSAY OF LACTIC ACID: CPT | Performed by: PHYSICIAN ASSISTANT

## 2022-11-15 PROCEDURE — 37000009 HC ANESTHESIA EA ADD 15 MINS: Performed by: UROLOGY

## 2022-11-15 PROCEDURE — 87040 BLOOD CULTURE FOR BACTERIA: CPT | Mod: 59 | Performed by: PHYSICIAN ASSISTANT

## 2022-11-15 PROCEDURE — 83036 HEMOGLOBIN GLYCOSYLATED A1C: CPT | Performed by: PHYSICIAN ASSISTANT

## 2022-11-15 PROCEDURE — 83735 ASSAY OF MAGNESIUM: CPT | Performed by: NURSE PRACTITIONER

## 2022-11-15 PROCEDURE — 83880 ASSAY OF NATRIURETIC PEPTIDE: CPT | Performed by: PHYSICIAN ASSISTANT

## 2022-11-15 PROCEDURE — 99284 EMERGENCY DEPT VISIT MOD MDM: CPT | Mod: 25,,, | Performed by: UROLOGY

## 2022-11-15 PROCEDURE — 80053 COMPREHEN METABOLIC PANEL: CPT | Performed by: PHYSICIAN ASSISTANT

## 2022-11-15 PROCEDURE — 96367 TX/PROPH/DG ADDL SEQ IV INF: CPT

## 2022-11-15 PROCEDURE — 93010 EKG 12-LEAD: ICD-10-PCS | Mod: ,,, | Performed by: INTERNAL MEDICINE

## 2022-11-15 PROCEDURE — 87075 CULTR BACTERIA EXCEPT BLOOD: CPT | Performed by: HOSPITALIST

## 2022-11-15 PROCEDURE — 36415 COLL VENOUS BLD VENIPUNCTURE: CPT | Performed by: PHYSICIAN ASSISTANT

## 2022-11-15 PROCEDURE — 99285 EMERGENCY DEPT VISIT HI MDM: CPT | Mod: 25

## 2022-11-15 PROCEDURE — 63600175 PHARM REV CODE 636 W HCPCS: Performed by: EMERGENCY MEDICINE

## 2022-11-15 PROCEDURE — 94761 N-INVAS EAR/PLS OXIMETRY MLT: CPT

## 2022-11-15 PROCEDURE — 84484 ASSAY OF TROPONIN QUANT: CPT | Mod: 91 | Performed by: PHYSICIAN ASSISTANT

## 2022-11-15 PROCEDURE — 37000008 HC ANESTHESIA 1ST 15 MINUTES: Performed by: UROLOGY

## 2022-11-15 PROCEDURE — 80162 ASSAY OF DIGOXIN TOTAL: CPT | Performed by: PHYSICIAN ASSISTANT

## 2022-11-15 PROCEDURE — 25000003 PHARM REV CODE 250: Performed by: NURSE PRACTITIONER

## 2022-11-15 PROCEDURE — 36415 COLL VENOUS BLD VENIPUNCTURE: CPT | Performed by: NURSE PRACTITIONER

## 2022-11-15 PROCEDURE — 99223 PR INITIAL HOSPITAL CARE,LEVL III: ICD-10-PCS | Mod: ,,, | Performed by: NURSE PRACTITIONER

## 2022-11-15 PROCEDURE — 87389 HIV-1 AG W/HIV-1&-2 AB AG IA: CPT | Performed by: EMERGENCY MEDICINE

## 2022-11-15 PROCEDURE — 93005 ELECTROCARDIOGRAM TRACING: CPT

## 2022-11-15 PROCEDURE — 83605 ASSAY OF LACTIC ACID: CPT | Mod: 91 | Performed by: NURSE PRACTITIONER

## 2022-11-15 PROCEDURE — 84100 ASSAY OF PHOSPHORUS: CPT | Performed by: NURSE PRACTITIONER

## 2022-11-15 PROCEDURE — 84484 ASSAY OF TROPONIN QUANT: CPT | Performed by: NURSE PRACTITIONER

## 2022-11-15 PROCEDURE — 80053 COMPREHEN METABOLIC PANEL: CPT | Mod: 91 | Performed by: NURSE PRACTITIONER

## 2022-11-15 PROCEDURE — 27000221 HC OXYGEN, UP TO 24 HOURS

## 2022-11-15 PROCEDURE — 85007 BL SMEAR W/DIFF WBC COUNT: CPT | Mod: 91 | Performed by: NURSE PRACTITIONER

## 2022-11-15 PROCEDURE — 11004 DBRDMT SKIN XTRNL GENT&PER: CPT | Mod: ,,, | Performed by: UROLOGY

## 2022-11-15 PROCEDURE — 87070 CULTURE OTHR SPECIMN AEROBIC: CPT | Performed by: HOSPITALIST

## 2022-11-15 PROCEDURE — 96368 THER/DIAG CONCURRENT INF: CPT

## 2022-11-15 PROCEDURE — 99223 1ST HOSP IP/OBS HIGH 75: CPT | Mod: ,,, | Performed by: NURSE PRACTITIONER

## 2022-11-15 PROCEDURE — 25500020 PHARM REV CODE 255: Performed by: EMERGENCY MEDICINE

## 2022-11-15 PROCEDURE — 85027 COMPLETE CBC AUTOMATED: CPT | Mod: 91 | Performed by: NURSE PRACTITIONER

## 2022-11-15 PROCEDURE — 25000003 PHARM REV CODE 250: Performed by: STUDENT IN AN ORGANIZED HEALTH CARE EDUCATION/TRAINING PROGRAM

## 2022-11-15 PROCEDURE — 36000705 HC OR TIME LEV I EA ADD 15 MIN: Performed by: UROLOGY

## 2022-11-15 PROCEDURE — 11004 PR DEBRIDE NECROTIC SKIN/ TISSUE, GENIT & PERINM: ICD-10-PCS | Mod: ,,, | Performed by: UROLOGY

## 2022-11-15 PROCEDURE — 20000000 HC ICU ROOM

## 2022-11-15 PROCEDURE — 25000242 PHARM REV CODE 250 ALT 637 W/ HCPCS

## 2022-11-15 PROCEDURE — 96365 THER/PROPH/DIAG IV INF INIT: CPT

## 2022-11-15 PROCEDURE — 87076 CULTURE ANAEROBE IDENT EACH: CPT | Performed by: HOSPITALIST

## 2022-11-15 PROCEDURE — 25000003 PHARM REV CODE 250: Performed by: HOSPITALIST

## 2022-11-15 PROCEDURE — 85007 BL SMEAR W/DIFF WBC COUNT: CPT | Performed by: PHYSICIAN ASSISTANT

## 2022-11-15 PROCEDURE — 36000704 HC OR TIME LEV I 1ST 15 MIN: Performed by: UROLOGY

## 2022-11-15 PROCEDURE — 94640 AIRWAY INHALATION TREATMENT: CPT

## 2022-11-15 PROCEDURE — 85027 COMPLETE CBC AUTOMATED: CPT | Performed by: PHYSICIAN ASSISTANT

## 2022-11-15 PROCEDURE — 93010 ELECTROCARDIOGRAM REPORT: CPT | Mod: ,,, | Performed by: INTERNAL MEDICINE

## 2022-11-15 RX ORDER — PHENYLEPHRINE HYDROCHLORIDE 10 MG/ML
INJECTION INTRAVENOUS
Status: DISCONTINUED | OUTPATIENT
Start: 2022-11-15 | End: 2022-11-15

## 2022-11-15 RX ORDER — DEXAMETHASONE SODIUM PHOSPHATE 4 MG/ML
INJECTION, SOLUTION INTRA-ARTICULAR; INTRALESIONAL; INTRAMUSCULAR; INTRAVENOUS; SOFT TISSUE
Status: DISCONTINUED | OUTPATIENT
Start: 2022-11-15 | End: 2022-11-15

## 2022-11-15 RX ORDER — IPRATROPIUM BROMIDE AND ALBUTEROL SULFATE 2.5; .5 MG/3ML; MG/3ML
3 SOLUTION RESPIRATORY (INHALATION) EVERY 4 HOURS PRN
Status: DISCONTINUED | OUTPATIENT
Start: 2022-11-15 | End: 2022-12-01 | Stop reason: HOSPADM

## 2022-11-15 RX ORDER — FENTANYL CITRATE 50 UG/ML
INJECTION, SOLUTION INTRAMUSCULAR; INTRAVENOUS
Status: DISCONTINUED | OUTPATIENT
Start: 2022-11-15 | End: 2022-11-15

## 2022-11-15 RX ORDER — INDOMETHACIN 25 MG/1
50 CAPSULE ORAL
Status: COMPLETED | OUTPATIENT
Start: 2022-11-15 | End: 2022-11-15

## 2022-11-15 RX ORDER — CLINDAMYCIN PHOSPHATE 900 MG/50ML
900 INJECTION, SOLUTION INTRAVENOUS
Status: DISCONTINUED | OUTPATIENT
Start: 2022-11-16 | End: 2022-11-21

## 2022-11-15 RX ORDER — SODIUM CHLORIDE 0.9 % (FLUSH) 0.9 %
3 SYRINGE (ML) INJECTION
Status: DISCONTINUED | OUTPATIENT
Start: 2022-11-15 | End: 2022-11-17

## 2022-11-15 RX ORDER — SODIUM CHLORIDE, SODIUM LACTATE, POTASSIUM CHLORIDE, CALCIUM CHLORIDE 600; 310; 30; 20 MG/100ML; MG/100ML; MG/100ML; MG/100ML
INJECTION, SOLUTION INTRAVENOUS CONTINUOUS
Status: DISCONTINUED | OUTPATIENT
Start: 2022-11-15 | End: 2022-11-15

## 2022-11-15 RX ORDER — SODIUM CHLORIDE 0.9 % (FLUSH) 0.9 %
10 SYRINGE (ML) INJECTION EVERY 8 HOURS PRN
Status: DISCONTINUED | OUTPATIENT
Start: 2022-11-15 | End: 2022-11-17

## 2022-11-15 RX ORDER — MUPIROCIN 20 MG/G
OINTMENT TOPICAL 2 TIMES DAILY
Status: COMPLETED | OUTPATIENT
Start: 2022-11-15 | End: 2022-11-20

## 2022-11-15 RX ORDER — METRONIDAZOLE 500 MG/100ML
500 INJECTION, SOLUTION INTRAVENOUS
Status: DISCONTINUED | OUTPATIENT
Start: 2022-11-15 | End: 2022-11-15

## 2022-11-15 RX ORDER — CALCIUM GLUCONATE 20 MG/ML
1 INJECTION, SOLUTION INTRAVENOUS
Status: COMPLETED | OUTPATIENT
Start: 2022-11-15 | End: 2022-11-15

## 2022-11-15 RX ORDER — SODIUM CHLORIDE 9 MG/ML
INJECTION, SOLUTION INTRAVENOUS CONTINUOUS
Status: DISCONTINUED | OUTPATIENT
Start: 2022-11-16 | End: 2022-11-24

## 2022-11-15 RX ORDER — GLUCAGON 1 MG
1 KIT INJECTION
Status: DISCONTINUED | OUTPATIENT
Start: 2022-11-15 | End: 2022-11-27

## 2022-11-15 RX ORDER — ROCURONIUM BROMIDE 10 MG/ML
INJECTION, SOLUTION INTRAVENOUS
Status: DISCONTINUED | OUTPATIENT
Start: 2022-11-15 | End: 2022-11-15

## 2022-11-15 RX ORDER — VASOPRESSIN 20 [USP'U]/ML
INJECTION, SOLUTION INTRAMUSCULAR; SUBCUTANEOUS
Status: DISCONTINUED | OUTPATIENT
Start: 2022-11-15 | End: 2022-11-15

## 2022-11-15 RX ORDER — ONDANSETRON 2 MG/ML
4 INJECTION INTRAMUSCULAR; INTRAVENOUS
Status: COMPLETED | OUTPATIENT
Start: 2022-11-15 | End: 2022-11-15

## 2022-11-15 RX ORDER — EZETIMIBE 10 MG/1
10 TABLET ORAL DAILY
Status: DISCONTINUED | OUTPATIENT
Start: 2022-11-16 | End: 2022-12-01 | Stop reason: HOSPADM

## 2022-11-15 RX ORDER — CLINDAMYCIN PHOSPHATE 900 MG/50ML
900 INJECTION, SOLUTION INTRAVENOUS
Status: DISCONTINUED | OUTPATIENT
Start: 2022-11-15 | End: 2022-11-15

## 2022-11-15 RX ORDER — IPRATROPIUM BROMIDE AND ALBUTEROL SULFATE 2.5; .5 MG/3ML; MG/3ML
SOLUTION RESPIRATORY (INHALATION)
Status: COMPLETED
Start: 2022-11-15 | End: 2022-11-15

## 2022-11-15 RX ORDER — DIGOXIN 125 MCG
125 TABLET ORAL DAILY
Status: DISCONTINUED | OUTPATIENT
Start: 2022-11-16 | End: 2022-12-01 | Stop reason: HOSPADM

## 2022-11-15 RX ORDER — ONDANSETRON 2 MG/ML
4 INJECTION INTRAMUSCULAR; INTRAVENOUS EVERY 8 HOURS PRN
Status: DISCONTINUED | OUTPATIENT
Start: 2022-11-15 | End: 2022-12-01 | Stop reason: HOSPADM

## 2022-11-15 RX ORDER — CEFEPIME HYDROCHLORIDE 1 G/50ML
2 INJECTION, SOLUTION INTRAVENOUS
Status: COMPLETED | OUTPATIENT
Start: 2022-11-15 | End: 2022-11-15

## 2022-11-15 RX ORDER — ONDANSETRON 2 MG/ML
INJECTION INTRAMUSCULAR; INTRAVENOUS
Status: DISCONTINUED | OUTPATIENT
Start: 2022-11-15 | End: 2022-11-15

## 2022-11-15 RX ORDER — FOLIC ACID 1 MG/1
1 TABLET ORAL DAILY
Status: DISCONTINUED | OUTPATIENT
Start: 2022-11-16 | End: 2022-12-01 | Stop reason: HOSPADM

## 2022-11-15 RX ORDER — INSULIN ASPART 100 [IU]/ML
1-10 INJECTION, SOLUTION INTRAVENOUS; SUBCUTANEOUS EVERY 6 HOURS PRN
Status: DISCONTINUED | OUTPATIENT
Start: 2022-11-15 | End: 2022-11-27

## 2022-11-15 RX ORDER — ACETAMINOPHEN 325 MG/1
650 TABLET ORAL EVERY 4 HOURS PRN
Status: DISCONTINUED | OUTPATIENT
Start: 2022-11-15 | End: 2022-12-01 | Stop reason: HOSPADM

## 2022-11-15 RX ORDER — SODIUM CHLORIDE 0.9 % (FLUSH) 0.9 %
10 SYRINGE (ML) INJECTION
Status: DISCONTINUED | OUTPATIENT
Start: 2022-11-15 | End: 2022-12-01 | Stop reason: HOSPADM

## 2022-11-15 RX ORDER — NALOXONE HCL 0.4 MG/ML
0.02 VIAL (ML) INJECTION
Status: DISCONTINUED | OUTPATIENT
Start: 2022-11-15 | End: 2022-12-01 | Stop reason: HOSPADM

## 2022-11-15 RX ORDER — PROPOFOL 10 MG/ML
VIAL (ML) INTRAVENOUS
Status: DISCONTINUED | OUTPATIENT
Start: 2022-11-15 | End: 2022-11-15

## 2022-11-15 RX ORDER — MORPHINE SULFATE 4 MG/ML
4 INJECTION, SOLUTION INTRAMUSCULAR; INTRAVENOUS EVERY 4 HOURS PRN
Status: DISCONTINUED | OUTPATIENT
Start: 2022-11-15 | End: 2022-11-19

## 2022-11-15 RX ORDER — LIDOCAINE HYDROCHLORIDE 20 MG/ML
INJECTION INTRAVENOUS
Status: DISCONTINUED | OUTPATIENT
Start: 2022-11-15 | End: 2022-11-15

## 2022-11-15 RX ORDER — METRONIDAZOLE 500 MG/100ML
500 INJECTION, SOLUTION INTRAVENOUS
Status: DISCONTINUED | OUTPATIENT
Start: 2022-11-15 | End: 2022-11-22

## 2022-11-15 RX ORDER — CEFEPIME HYDROCHLORIDE 1 G/50ML
1 INJECTION, SOLUTION INTRAVENOUS
Status: DISCONTINUED | OUTPATIENT
Start: 2022-11-16 | End: 2022-11-18

## 2022-11-15 RX ADMIN — SODIUM CHLORIDE, SODIUM LACTATE, POTASSIUM CHLORIDE, AND CALCIUM CHLORIDE: .6; .31; .03; .02 INJECTION, SOLUTION INTRAVENOUS at 10:11

## 2022-11-15 RX ADMIN — SODIUM BICARBONATE 50 MEQ: 84 INJECTION, SOLUTION INTRAVENOUS at 07:11

## 2022-11-15 RX ADMIN — SODIUM CHLORIDE, SODIUM LACTATE, POTASSIUM CHLORIDE, AND CALCIUM CHLORIDE 3294 ML: .6; .31; .03; .02 INJECTION, SOLUTION INTRAVENOUS at 05:11

## 2022-11-15 RX ADMIN — CLINDAMYCIN PHOSPHATE 900 MG: 900 INJECTION, SOLUTION INTRAVENOUS at 05:11

## 2022-11-15 RX ADMIN — IPRATROPIUM BROMIDE AND ALBUTEROL SULFATE 3 ML: 2.5; .5 SOLUTION RESPIRATORY (INHALATION) at 10:11

## 2022-11-15 RX ADMIN — MUPIROCIN: 20 OINTMENT TOPICAL at 10:11

## 2022-11-15 RX ADMIN — INSULIN HUMAN 5 UNITS: 100 INJECTION, SOLUTION PARENTERAL at 07:11

## 2022-11-15 RX ADMIN — FENTANYL CITRATE 50 MCG: 50 INJECTION, SOLUTION INTRAMUSCULAR; INTRAVENOUS at 08:11

## 2022-11-15 RX ADMIN — PHENYLEPHRINE HYDROCHLORIDE 200 MCG: 10 INJECTION INTRAVENOUS at 08:11

## 2022-11-15 RX ADMIN — ONDANSETRON HYDROCHLORIDE 4 MG: 2 INJECTION INTRAMUSCULAR; INTRAVENOUS at 08:11

## 2022-11-15 RX ADMIN — VASOPRESSIN 2 UNITS: 20 INJECTION, SOLUTION INTRAMUSCULAR; SUBCUTANEOUS at 09:11

## 2022-11-15 RX ADMIN — CEFEPIME 2 G: 2 INJECTION, POWDER, FOR SOLUTION INTRAVENOUS at 07:11

## 2022-11-15 RX ADMIN — PHENYLEPHRINE HYDROCHLORIDE 200 MCG: 10 INJECTION INTRAVENOUS at 09:11

## 2022-11-15 RX ADMIN — VANCOMYCIN HYDROCHLORIDE 2000 MG: 500 INJECTION, POWDER, LYOPHILIZED, FOR SOLUTION INTRAVENOUS at 06:11

## 2022-11-15 RX ADMIN — DEXAMETHASONE SODIUM PHOSPHATE 4 MG: 4 INJECTION, SOLUTION INTRAMUSCULAR; INTRAVENOUS at 08:11

## 2022-11-15 RX ADMIN — ROCURONIUM BROMIDE 50 MG: 10 INJECTION, SOLUTION INTRAVENOUS at 08:11

## 2022-11-15 RX ADMIN — METRONIDAZOLE 500 MG: 500 INJECTION, SOLUTION INTRAVENOUS at 08:11

## 2022-11-15 RX ADMIN — PROPOFOL 200 MG: 10 INJECTION, EMULSION INTRAVENOUS at 08:11

## 2022-11-15 RX ADMIN — MORPHINE SULFATE 4 MG: 4 INJECTION, SOLUTION INTRAMUSCULAR; INTRAVENOUS at 10:11

## 2022-11-15 RX ADMIN — SUGAMMADEX 400 MG: 100 INJECTION, SOLUTION INTRAVENOUS at 09:11

## 2022-11-15 RX ADMIN — CALCIUM GLUCONATE 1 G: 20 INJECTION, SOLUTION INTRAVENOUS at 07:11

## 2022-11-15 RX ADMIN — SODIUM CHLORIDE: 0.9 INJECTION, SOLUTION INTRAVENOUS at 08:11

## 2022-11-15 RX ADMIN — ONDANSETRON 4 MG: 2 INJECTION INTRAMUSCULAR; INTRAVENOUS at 06:11

## 2022-11-15 RX ADMIN — LIDOCAINE HYDROCHLORIDE 100 MG: 20 INJECTION, SOLUTION INTRAVENOUS at 08:11

## 2022-11-15 RX ADMIN — IOHEXOL 100 ML: 350 INJECTION, SOLUTION INTRAVENOUS at 07:11

## 2022-11-15 NOTE — ED PROVIDER NOTES
Encounter Date: 11/15/2022       History     Chief Complaint   Patient presents with    Groin Swelling     Scrotal swelling, redness and fever x 4 weeks.      67M h/o DM2, CAD, HTN, HLD presents with scrotal pain and swelling. Started about 4 weeks ago and has been progressively worsening despite a couple courses of antibiotics (unsure which ones). He reports fevers and chills at home, nausea, decreased PO intake, and multiple syncopal episodes with +incontinence of stool. Unable to urinate for the past 24 hours or so. Sugars 190s at home. He was seen yesterday in urgent care and referred to the ER, but says he wasn't able to make it at that time so he comes today instead.     Review of patient's allergies indicates:   Allergen Reactions    Enbrel [etanercept] Shortness Of Breath     CHF    Nsaids (non-steroidal anti-inflammatory drug) Other (See Comments)     hypertention  Other reaction(s): Other (See Comments)  hypertention  HTN    Statins-hmg-coa reductase inhibitors Other (See Comments)     Heart arrythemias  Other reaction(s): Other (See Comments)  Heart arrythemias  Joint pain and cardiac arrythmias    Pcn [penicillins] Rash     Past Medical History:   Diagnosis Date    Arthritis     Atrial myxoma     CHF (congestive heart failure)     Coronary atherosclerosis     Diabetes mellitus, type 2     Difficult intubation     Hepatitis B     Hyperlipidemia     Hypertension     Non-alcoholic fatty liver disease     Rheumatoid arthritis     Rheumatoid arthritis flare 07/12/2021    Stroke     TIA    Systolic heart failure      Past Surgical History:   Procedure Laterality Date    CORONARY ANGIOGRAPHY N/A 3/10/2021    Procedure: ANGIOGRAM, CORONARY ARTERY - right radial;  Surgeon: Shemar Dempsey MD;  Location: [facility] CATH LAB;  Service: Cardiology;  Laterality: N/A;    CORONARY STENT PLACEMENT  03/10/2021    prox-mid RCA [place] 4.5 x 26 mm, 4.5 x 12 mm    INCISION AND DRAINAGE OF SCROTUM N/A 11/15/2022    Procedure:  INCISION AND DRAINAGE, SCROTUM;  Surgeon: William Hatch MD;  Location: [facility];  Service: Urology;  Laterality: N/A;    LUNG LOBECTOMY Right 2008    RUL lobectomy after removal of atrial myxoma    PLEURA BIOPSY      RESECTION OF ATRIAL MYXOMA  2007     Family History   Problem Relation Age of Onset    Hodgkin's lymphoma Mother     Diabetes type II Mother     Kidney failure Father     Diabetes type I Father     Cancer Sister      Social History     Tobacco Use    Smoking status: Some Days     Packs/day: 1.00     Years: 35.00     Pack years: 35.00     Types: Cigarettes    Smokeless tobacco: Never   Substance Use Topics    Alcohol use: Not Currently    Drug use: No     Review of Systems   Constitutional:  Positive for fatigue and fever.   Respiratory:  Positive for cough.    Gastrointestinal:  Positive for nausea. Negative for abdominal pain and vomiting.   Genitourinary:  Positive for decreased urine volume, difficulty urinating, scrotal swelling and testicular pain.   Skin:  Positive for rash.   Neurological:  Positive for syncope.   All other systems reviewed and are negative.    Physical Exam     Initial Vitals   BP Pulse Resp Temp SpO2   11/15/22 1720 11/15/22 1720 11/15/22 1720 11/15/22 1731 11/15/22 1720   (!) 80/46 110 20 99.9 °F (37.7 °C) (!) 93 %      MAP       --                Physical Exam    Constitutional: He is not diaphoretic. No distress.   HENT:   Head: Normocephalic.   Eyes: EOM are normal.   Cardiovascular:            Tachycardic, regular   Pulmonary/Chest: Breath sounds normal. No respiratory distress.   Abdominal: Abdomen is soft. There is no abdominal tenderness.   Genitourinary:    Genitourinary Comments: Scrotum diffusely swollen, erythematous, tender to palpation. No crepitus.        Neurological: He is alert and oriented to person, place, and time.   Skin: Rash noted.       ED Course   Procedures  Labs Reviewed   CBC W/ AUTO DIFFERENTIAL - Abnormal; Notable for the following components:        Result Value    WBC 22.42 (*)     MCV 99 (*)     MCH 34.1 (*)     Gran % 76.0 (*)     Lymph % 8.0 (*)     All other components within normal limits   COMPREHENSIVE METABOLIC PANEL - Abnormal; Notable for the following components:    Sodium 131 (*)     Potassium 6.1 (*)     Chloride 94 (*)     Glucose 255 (*)     BUN 44 (*)     Creatinine 2.5 (*)     Albumin 2.6 (*)     Total Bilirubin 1.1 (*)     AST 77 (*)     ALT 54 (*)     eGFR 27 (*)     All other components within normal limits   LACTIC ACID, PLASMA - Abnormal; Notable for the following components:    Lactate (Lactic Acid) 4.4 (*)     All other components within normal limits    Narrative:        critical result(s) called and verbal readback obtained from   SALMA WHITE RN by CHRaad 11/15/2022 18:14   PROCALCITONIN - Abnormal; Notable for the following components:    Procalcitonin 48.84 (*)     All other components within normal limits   TROPONIN I - Abnormal; Notable for the following components:    Troponin I 0.455 (*)     All other components within normal limits   ISTAT LACTATE - Abnormal; Notable for the following components:    POC Lactate 5.63 (*)     All other components within normal limits   ISTAT CREATININE - Abnormal; Notable for the following components:    POC Creatinine 2.5 (*)     All other components within normal limits   POCT GLUCOSE - Abnormal; Notable for the following components:    POCT Glucose 250 (*)     All other components within normal limits   B-TYPE NATRIURETIC PEPTIDE   DIGOXIN LEVEL   HEMOGLOBIN A1C   URINALYSIS, REFLEX TO URINE CULTURE   POCT INFLUENZA A/B MOLECULAR   POCT GLUCOSE MONITORING CONTINUOUS     EKG Readings: (Independently Interpreted)   Sinus tach rate 102  Narrow complex  LAD  No STEMI   ECG Results              EKG 12-lead (Final result)  Result time 11/16/22 11:33:06      Final result by Interface, Lab In [facility] (11/16/22 11:33:06)                   Narrative:    Test Reason : A41.9,    Vent. Rate : 102  BPM     Atrial Rate : 102 BPM     P-R Int : 180 ms          QRS Dur : 098 ms      QT Int : 338 ms       P-R-T Axes : 065 -34 070 degrees     QTc Int : 440 ms    Sinus tachycardia  Left axis deviation  Low voltage QRS  Abnormal ECG    Confirmed by Laila Norton MD ([id number]) on 11/16/2022 11:32:56 AM    Referred By: [email or web address]   SELF           Confirmed By:Laila Norton MD                                  Imaging Results               CT Abdomen Pelvis With Contrast (Final result)  Result time 11/15/22 19:47:51      Final result by Latanya Liz MD (11/15/22 19:47:51)                   Impression:      1. Extensive left-sided scrotal air with diffuse scrotal wall thickening, edema, and inflammatory changes seen which extend into the left perineal/inguinal region.  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  2. Additional findings as detailed above  This report was flagged in Epic as abnormal.      Electronically signed by: Latanya Liz MD  Date:    11/15/2022  Time:    19:47               Narrative:    EXAMINATION:  CT ABDOMEN PELVIS WITH CONTRAST    CLINICAL HISTORY:  Sepsis;    TECHNIQUE:  Low dose axial images, sagittal and coronal reformations were obtained from the lung bases to the pubic symphysis following the IV administration of 100 mL of Omnipaque 350 .  Oral contrast was not given.    COMPARISON:  CT abdomen and pelvis from 10/20/2022.    FINDINGS:  The visualized portion of the heart is unremarkable.  Mild atelectasis or scarring is seen within the right lung base.    No significant hepatic abnormalities are identified.  There is no intra-or extrahepatic biliary ductal dilatation.  The gallbladder is unremarkable.  The stomach, pancreas, spleen, and adrenal glands are unremarkable.    Kidneys enhance normally with no evidence of hydronephrosis.  Small bilateral nonobstructing renal stones are seen.  Small bilateral renal hypodensities, probable cysts are noted.  There is mild symmetric  bilateral perinephric stranding, similar to previous exam.  No significant abnormalities are seen along the ureteral courses.  Urinary bladder and prostate are unremarkable.    Appendix is visualized and is unremarkable.  The visualized loops of small and large bowel show no evidence of obstruction or inflammation.  Scattered colonic diverticula are seen, more pronounced within the descending and sigmoid colon.  No free air or free fluid.    There is mild ectasia of the infrarenal abdominal aorta measuring 2.3 cm.  Mild to moderate atherosclerosis is seen    No acute osseous abnormality identified. Multilevel degenerative changes are seen throughout the spine.    Extensive soft tissue air is seen within the left aspect of the scrotum with several foci of air also seen extending into the left groin/inguinal region.  There is diffuse scrotal soft tissue edema and wall thickening.  Additional edema and mild inflammatory changes are seen extending within the left groin/inguinal region.  Small amount of disorganized fluid is seen in the left perineal region near the base of the scrotum with no definite rim enhancing abscess seen at this time.                                       X-Ray Chest AP Portable (Final result)  Result time 11/15/22 18:18:53      Final result by Latanya Liz MD (11/15/22 18:18:53)                   Impression:      New region of consolidation in the right mid lung zone which is most suggestive for pneumonia given short-term interval development.  Possible small right pleural effusion.  Future radiographic follow-up is recommended to ensure resolution.      Electronically signed by: Latanya Liz MD  Date:    11/15/2022  Time:    18:18               Narrative:    EXAMINATION:  XR CHEST AP PORTABLE    CLINICAL HISTORY:  Sepsis;    TECHNIQUE:  Single frontal view of the chest was performed.    COMPARISON:  10/26/2022.    FINDINGS:  Cardiac silhouette is stable in size.  Lungs are slightly  hypoinflated.  New region of dense consolidation is seen in the right mid lung zone.  No pneumothorax.  There is blunting of the costophrenic angle on the right which may reflect a small right-sided pleural effusion.  No significant pleural effusion is seen on the left.  No pneumothorax.                                       Medications   sodium chloride 0.9% flush 10 mL (has no administration in time range)   clindamycin in D5W 900 mg/50 mL IVPB 900 mg (0 mg Intravenous Stopped 11/16/22 1017)   digoxin tablet 125 mcg (125 mcg Oral Given 11/16/22 0934)   ezetimibe tablet 10 mg (10 mg Oral Given 11/16/22 0918)   folic acid tablet 1 mg (1 mg Oral Given 11/16/22 0918)   insulin detemir U-100 pen 25 Units (25 Units Subcutaneous Given 11/16/22 0918)   multivitamin tablet (1 tablet Oral Given 11/16/22 0918)   sodium chloride 0.9% flush 10 mL (has no administration in time range)   acetaminophen tablet 650 mg (has no administration in time range)   naloxone 0.4 mg/mL injection 0.02 mg (has no administration in time range)   ondansetron injection 4 mg (has no administration in time range)   morphine injection 4 mg (4 mg Intravenous Given 11/16/22 1049)   glucagon (human recombinant) injection 1 mg (has no administration in time range)   dextrose 10% bolus 125 mL (has no administration in time range)   dextrose 10% bolus 250 mL (has no administration in time range)   insulin aspart U-100 pen 1-10 Units (2 Units Subcutaneous Given 11/16/22 0643)   cefepime in dextrose 5 % 1 gram/50 mL IVPB 1 g (0 g Intravenous Stopped 11/16/22 0948)   vancomycin - pharmacy to dose ( Intravenous Random Level Due 11/16/22 1700)   metronidazole IVPB 500 mg (0 mg Intravenous Stopped 11/16/22 0603)   sodium chloride 0.9% flush 3 mL (has no administration in time range)   mupirocin 2 % ointment ( Nasal Given 11/16/22 0919)   albuterol-ipratropium 2.5 mg-0.5 mg/3 mL nebulizer solution 3 mL (has no administration in time range)   0.9%  NaCl infusion  ( Intravenous Verify Only 11/16/22 0605)   lactated ringers bolus 3,294 mL (3,294 mLs Intravenous New Bag 11/15/22 1742)   vancomycin 2 g in dextrose 5 % 500 mL IVPB (0 mg Intravenous Stopped 11/15/22 2058)   cefepime in dextrose 5 % IVPB 2 g (0 g Intravenous Stopped 11/15/22 2002)   ondansetron injection 4 mg (4 mg Intravenous Given 11/15/22 1828)   iohexoL (OMNIPAQUE 350) injection 100 mL (100 mLs Intravenous Given 11/15/22 1905)   calcium gluconate 1 g in NS IVPB (premixed) (0 g Intravenous Stopped 11/15/22 2035)   insulin regular injection 5 Units 0.05 mL (5 Units Intravenous Given 11/15/22 1942)   sodium bicarbonate solution 50 mEq (50 mEq Intravenous Given 11/15/22 1941)   dextrose 10% bolus 125 mL (0 mLs Intravenous Stopped 11/15/22 2014)   albuterol-ipratropium (DUO-NEB) 2.5 mg-0.5 mg/3 mL nebulizer solution (3 mLs  Given 11/15/22 2236)     Medical Decision Making:   ED Management:  67M PMHx as above p/w gradually worsening scrotal pain and swelling x weeks. On arrival T99.9, , BP 80/46. Exam concerning for scrotal/perineal infection.     Plan:  -Broad spectrum abx, IV fluid bolus  -Sepsis workup  -Pt discussed with both general surgery Dr. Whitehead and urology Dr Hatch, they will evaluate patient    6:25pm  WBC 22, lactate 5.6. Cr 2.5. I attempted to discuss with patient risks/benefits of contrast dye but he is nauseated, vomiting, and unable to give informed consent. In this emergent situation I feel strongly it is in his best interests to receive IV contrast. I will hydrate before and after.     6:28pm  K 6.1, no EKG changes. I have ordered calcium, bicarb, insulin, and glucose. Do not want to order kayexalate as pt is already having lots of watery stool.    7:30pm  CT shows Mary's. Urology will take patient to the OR emergently.      7:37pm  Pt discussed w on-call hospitalist, pt to be admitted to ICU bed after OR, admitting Dr. 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

## 2022-11-15 NOTE — Clinical Note
Diagnosis: Sepsis [489012]   Admitting Provider:: SEUN THOMPSON [[id number]]   Future Attending Provider: KOKI MUNIZ [[id number]]   Reason for IP Medical Treatment  (Clinical interventions that can only be accomplished in the IP setting? ) :: emergent surgery, iv abx   Estimated Length of Stay:: 5+ midnights   I certify that Inpatient services for greater than or equal to 2 midnights are medically necessary:: Yes   Plans for Post-Acute care--if anticipated (pick the single best option):: D. Skilled Nursing Placement

## 2022-11-15 NOTE — ED NOTES
Pt to ER with scrotal pain,erythema, edema x couple of weeks that is worsening. Recently dx and tx'ed for PNA on 10/20/2022. Saw PCP on Friday for a follow up for PNA and scrotal swelling. Still has about 4 days left of antibiotics.Started with diarrhea yesterday. Productive cough. Reports black sputum. Fever at home TMAX 100.6.

## 2022-11-15 NOTE — HPI
67M h/o DM2, CAD, HTN, HLD presents with scrotal pain and swelling. Started about 4 weeks ago and has been progressively worsening despite a couple courses of antibiotics (unsure which ones). He reports fevers and chills at home. He also reports nausea, decreased PO intake, multiple syncopal episodes, and stool incontinence. He is on continuous blood glucose monitoring and states blood sugars have been in 190s today. He was seen yesterday in urgent care and diagnosed with scrotal abscess with possible kami's; he referred to the ER but waited until today to come in.    He was hypotensive and tachycardic on arrival in the ED. Sepsis protocol was started. Labs and imaging are currently pending.

## 2022-11-15 NOTE — PROGRESS NOTES
"Subjective:       Patient ID: Nithin Wagner is a 67 y.o. male.    Vitals:  height is 5' 11" (1.803 m) and weight is 110 kg (242 lb 9.6 oz). His temporal temperature is 97.7 °F (36.5 °C). His blood pressure is 128/80 and his pulse is 92. His respiration is 14 and oxygen saturation is 92% (abnormal).     Chief Complaint: Abscess    Patient c/o abscess on groin that's painful x4 days ago. Patient stated that he got medication from PCP and was given antibiotics. Patient stated that he wad notified by PCP that he sound come and be seen.     Abscess  Chronicity:  NewProgression Since Onset: rapidly worsening  Abscess location: groin.  Associated Symptoms: a fever, chills  Characteristics: painful, redness and blistering    Pain Scale:  10/10  Treatments Tried:  Cold compresses, warm compresses and oral antibiotics  Relieved by:  Nothing  Exacerbated by: SEATING.    Constitution: Positive for chills and fever.   Skin:  Positive for abscess. Negative for erythema.     Objective:      Physical Exam   Constitutional: He is oriented to person, place, and time. He appears well-developed.   HENT:   Head: Normocephalic and atraumatic. Head is without abrasion, without contusion and without laceration.   Ears:   Right Ear: External ear normal.   Left Ear: External ear normal.   Nose: Nose normal.   Mouth/Throat: Oropharynx is clear and moist and mucous membranes are normal.   Eyes: Conjunctivae, EOM and lids are normal. Pupils are equal, round, and reactive to light.   Neck: Trachea normal and phonation normal. Neck supple.   Cardiovascular: Normal rate, regular rhythm and normal heart sounds.   Pulmonary/Chest: Effort normal and breath sounds normal. No stridor. No respiratory distress.   Genitourinary:         Musculoskeletal: Normal range of motion.         General: Normal range of motion.   Neurological: He is alert and oriented to person, place, and time.   Skin: Skin is warm, dry, intact and no rash. Capillary refill takes " less than 2 seconds. No abrasion, No burn, No bruising, No erythema and No ecchymosis   Psychiatric: His speech is normal and behavior is normal. Judgment and thought content normal.   Nursing note and vitals reviewed.      Assessment:       1. Abscess of multiple sites of perineum    2. Type 2 diabetes mellitus with hyperglycemia, with long-term current use of insulin    3. Smoker unmotivated to quit    4. Rheumatoid arthritis flare          Plan:         Abscess of multiple sites of perineum  -     Refer to Emergency Dept.    Type 2 diabetes mellitus with hyperglycemia, with long-term current use of insulin    Smoker unmotivated to quit    Rheumatoid arthritis flare               Perineal infection possible Mary in immunocompromised patient. Referred to ER, notified [facility] per pt preference

## 2022-11-15 NOTE — SUBJECTIVE & OBJECTIVE
Past Medical History:   Diagnosis Date    Arthritis     Atrial myxoma     Coronary atherosclerosis     Diabetes mellitus, type 2     Difficult intubation     Hepatitis B     Hyperlipidemia     Hypertension     Non-alcoholic fatty liver disease     Rheumatoid arthritis     Rheumatoid arthritis flare 07/12/2021    Stroke     TIA    Systolic heart failure        Past Surgical History:   Procedure Laterality Date    CORONARY ANGIOGRAPHY N/A 3/10/2021    Procedure: ANGIOGRAM, CORONARY ARTERY - right radial;  Surgeon: Shemar Dempsey MD;  Location: [facility] CATH LAB;  Service: Cardiology;  Laterality: N/A;    CORONARY STENT PLACEMENT  03/10/2021    prox-mid RCA Marshal 4.5 x 26 mm, 4.5 x 12 mm    LUNG LOBECTOMY Right 2008    RUL lobectomy after removal of atrial myxoma    PLEURA BIOPSY      RESECTION OF ATRIAL MYXOMA  2007       Review of patient's allergies indicates:   Allergen Reactions    Enbrel [etanercept] Shortness Of Breath     CHF    Nsaids (non-steroidal anti-inflammatory drug) Other (See Comments)     hypertention  Other reaction(s): Other (See Comments)  hypertention  HTN    Statins-hmg-coa reductase inhibitors Other (See Comments)     Heart arrythemias  Other reaction(s): Other (See Comments)  Heart arrythemias  Joint pain and cardiac arrythmias    Pcn [penicillins] Rash       Family History       Problem Relation (Age of Onset)    Cancer Sister    Diabetes type I Father    Diabetes type II Mother    Hodgkin's lymphoma Mother    Kidney failure Father            Tobacco Use    Smoking status: Some Days     Packs/day: 1.00     Years: 35.00     Pack years: 35.00     Types: Cigarettes    Smokeless tobacco: Never   Substance and Sexual Activity    Alcohol use: Not Currently    Drug use: No    Sexual activity: Never       Review of Systems   Constitutional:  Positive for chills and fever.   HENT: Negative.     Eyes: Negative.    Gastrointestinal:  Positive for diarrhea, nausea and vomiting.   Genitourinary:  Positive for  difficulty urinating and scrotal swelling.   Neurological:  Positive for syncope.   Psychiatric/Behavioral: Negative.       Objective:     Temp:  [97.7 °F (36.5 °C)-99.9 °F (37.7 °C)] 99.9 °F (37.7 °C)  Pulse:  [] 102  Resp:  [14-33] 27  SpO2:  [92 %-93 %] 93 %  BP: ()/(46-80) 84/51     Body mass index is 32.82 kg/m².           Drains       None                   Physical Exam  Constitutional:       General: He is not in acute distress.     Appearance: He is well-developed.   HENT:      Head: Normocephalic and atraumatic.   Eyes:      General:         Right eye: No discharge.         Left eye: No discharge.      Conjunctiva/sclera: Conjunctivae normal.      Pupils: Pupils are equal, round, and reactive to light.   Neck:      Thyroid: No thyromegaly.      Trachea: No tracheal deviation.   Cardiovascular:      Rate and Rhythm: Regular rhythm.      Chest Wall: PMI is not displaced.   Pulmonary:      Effort: Pulmonary effort is normal. No accessory muscle usage or respiratory distress.   Chest:      Chest wall: No mass, tenderness or crepitus.   Abdominal:      General: There is no distension.      Palpations: Abdomen is soft.   Genitourinary:     Comments: Diffuse scrotal edema and mild eythema without crepitus or any areas of fluctuance noted.  Musculoskeletal:      Cervical back: Normal range of motion and neck supple.   Skin:     General: Skin is warm and dry.      Findings: No erythema or rash.   Neurological:      Cranial Nerves: No cranial nerve deficit.      Sensory: No sensory deficit.   Psychiatric:         Mood and Affect: Mood is not anxious or depressed.         Speech: Speech normal.         Behavior: Behavior normal.         Thought Content: Thought content normal.         Judgment: Judgment normal.       Significant Labs:    BMP:  Recent Labs   Lab 11/15/22  1740   *   K 6.1*   CL 94*   CO2 23   BUN 44*   CREATININE 2.5*   CALCIUM 9.3       CBC:  Recent Labs   Lab 11/15/22  1740   WBC  22.42*   HGB 16.9   HCT 49.1        Lactic Acid: 4.4    Significant Imaging:    CT A/P: pending

## 2022-11-16 PROBLEM — E87.20 LACTIC ACIDOSIS: Status: ACTIVE | Noted: 2022-11-16

## 2022-11-16 PROBLEM — D72.829 LEUKOCYTOSIS: Status: ACTIVE | Noted: 2022-11-16

## 2022-11-16 PROBLEM — I50.32 CHRONIC DIASTOLIC CONGESTIVE HEART FAILURE: Status: ACTIVE | Noted: 2022-11-16

## 2022-11-16 LAB
ALBUMIN SERPL BCP-MCNC: 2 G/DL (ref 3.5–5.2)
ALP SERPL-CCNC: 89 U/L (ref 55–135)
ALT SERPL W/O P-5'-P-CCNC: 87 U/L (ref 10–44)
ANION GAP SERPL CALC-SCNC: 8 MMOL/L (ref 8–16)
AST SERPL-CCNC: 82 U/L (ref 10–40)
BASOPHILS # BLD AUTO: ABNORMAL K/UL (ref 0–0.2)
BASOPHILS NFR BLD: 0 % (ref 0–1.9)
BILIRUB SERPL-MCNC: 0.5 MG/DL (ref 0.1–1)
BUN SERPL-MCNC: 33 MG/DL (ref 8–23)
CALCIUM SERPL-MCNC: 8.4 MG/DL (ref 8.7–10.5)
CHLORIDE SERPL-SCNC: 99 MMOL/L (ref 95–110)
CO2 SERPL-SCNC: 26 MMOL/L (ref 23–29)
CREAT SERPL-MCNC: 1.2 MG/DL (ref 0.5–1.4)
DACRYOCYTES BLD QL SMEAR: ABNORMAL
DIFFERENTIAL METHOD: ABNORMAL
EOSINOPHIL # BLD AUTO: ABNORMAL K/UL (ref 0–0.5)
EOSINOPHIL NFR BLD: 0 % (ref 0–8)
ERYTHROCYTE [DISTWIDTH] IN BLOOD BY AUTOMATED COUNT: 13.2 % (ref 11.5–14.5)
EST. GFR  (NO RACE VARIABLE): >60 ML/MIN/1.73 M^2
ESTIMATED AVG GLUCOSE: 171 MG/DL (ref 68–131)
GIANT PLATELETS BLD QL SMEAR: PRESENT
GLUCOSE SERPL-MCNC: 194 MG/DL (ref 70–110)
HBA1C MFR BLD: 7.6 % (ref 4–5.6)
HCT VFR BLD AUTO: 42.3 % (ref 40–54)
HGB BLD-MCNC: 14.1 G/DL (ref 14–18)
IMM GRANULOCYTES # BLD AUTO: ABNORMAL K/UL (ref 0–0.04)
IMM GRANULOCYTES NFR BLD AUTO: ABNORMAL % (ref 0–0.5)
LYMPHOCYTES # BLD AUTO: ABNORMAL K/UL (ref 1–4.8)
LYMPHOCYTES NFR BLD: 6 % (ref 18–48)
MAGNESIUM SERPL-MCNC: 2.2 MG/DL (ref 1.6–2.6)
MCH RBC QN AUTO: 34.1 PG (ref 27–31)
MCHC RBC AUTO-ENTMCNC: 33.3 G/DL (ref 32–36)
MCV RBC AUTO: 102 FL (ref 82–98)
METAMYELOCYTES NFR BLD MANUAL: 1 %
MONOCYTES # BLD AUTO: ABNORMAL K/UL (ref 0.3–1)
MONOCYTES NFR BLD: 6 % (ref 4–15)
NEUTROPHILS NFR BLD: 73 % (ref 38–73)
NEUTS BAND NFR BLD MANUAL: 14 %
NRBC BLD-RTO: 0 /100 WBC
PHOSPHATE SERPL-MCNC: 5.2 MG/DL (ref 2.7–4.5)
PLATELET # BLD AUTO: 190 K/UL (ref 150–450)
PLATELET BLD QL SMEAR: ABNORMAL
PMV BLD AUTO: 11 FL (ref 9.2–12.9)
POCT GLUCOSE: 179 MG/DL (ref 70–110)
POCT GLUCOSE: 180 MG/DL (ref 70–110)
POCT GLUCOSE: 184 MG/DL (ref 70–110)
POCT GLUCOSE: 214 MG/DL (ref 70–110)
POCT GLUCOSE: 252 MG/DL (ref 70–110)
POIKILOCYTOSIS BLD QL SMEAR: SLIGHT
POTASSIUM SERPL-SCNC: 5.1 MMOL/L (ref 3.5–5.1)
PROT SERPL-MCNC: 5.3 G/DL (ref 6–8.4)
RBC # BLD AUTO: 4.13 M/UL (ref 4.6–6.2)
SMUDGE CELLS BLD QL SMEAR: PRESENT
SODIUM SERPL-SCNC: 133 MMOL/L (ref 136–145)
TROPONIN I SERPL DL<=0.01 NG/ML-MCNC: 0.36 NG/ML (ref 0–0.03)
VANCOMYCIN SERPL-MCNC: 5.7 UG/ML
WBC # BLD AUTO: 24.13 K/UL (ref 3.9–12.7)

## 2022-11-16 PROCEDURE — 80202 ASSAY OF VANCOMYCIN: CPT | Performed by: HOSPITALIST

## 2022-11-16 PROCEDURE — 80053 COMPREHEN METABOLIC PANEL: CPT | Performed by: NURSE PRACTITIONER

## 2022-11-16 PROCEDURE — S0030 INJECTION, METRONIDAZOLE: HCPCS | Performed by: NURSE PRACTITIONER

## 2022-11-16 PROCEDURE — 99233 SBSQ HOSP IP/OBS HIGH 50: CPT | Mod: ,,, | Performed by: UROLOGY

## 2022-11-16 PROCEDURE — 99291 CRITICAL CARE FIRST HOUR: CPT | Mod: ,,, | Performed by: HOSPITALIST

## 2022-11-16 PROCEDURE — 25000003 PHARM REV CODE 250: Performed by: NURSE PRACTITIONER

## 2022-11-16 PROCEDURE — 85027 COMPLETE CBC AUTOMATED: CPT | Performed by: NURSE PRACTITIONER

## 2022-11-16 PROCEDURE — 84484 ASSAY OF TROPONIN QUANT: CPT | Performed by: NURSE PRACTITIONER

## 2022-11-16 PROCEDURE — 20000000 HC ICU ROOM

## 2022-11-16 PROCEDURE — 99900035 HC TECH TIME PER 15 MIN (STAT)

## 2022-11-16 PROCEDURE — 99233 PR SUBSEQUENT HOSPITAL CARE,LEVL III: ICD-10-PCS | Mod: ,,, | Performed by: UROLOGY

## 2022-11-16 PROCEDURE — 36415 COLL VENOUS BLD VENIPUNCTURE: CPT | Performed by: NURSE PRACTITIONER

## 2022-11-16 PROCEDURE — 85007 BL SMEAR W/DIFF WBC COUNT: CPT | Performed by: NURSE PRACTITIONER

## 2022-11-16 PROCEDURE — 99291 PR CRITICAL CARE, E/M 30-74 MINUTES: ICD-10-PCS | Mod: ,,, | Performed by: HOSPITALIST

## 2022-11-16 PROCEDURE — 94761 N-INVAS EAR/PLS OXIMETRY MLT: CPT

## 2022-11-16 PROCEDURE — 63600175 PHARM REV CODE 636 W HCPCS: Performed by: HOSPITALIST

## 2022-11-16 PROCEDURE — 36415 COLL VENOUS BLD VENIPUNCTURE: CPT | Performed by: HOSPITALIST

## 2022-11-16 PROCEDURE — 63600175 PHARM REV CODE 636 W HCPCS: Performed by: NURSE PRACTITIONER

## 2022-11-16 PROCEDURE — 84100 ASSAY OF PHOSPHORUS: CPT | Performed by: NURSE PRACTITIONER

## 2022-11-16 PROCEDURE — 25000003 PHARM REV CODE 250: Performed by: INTERNAL MEDICINE

## 2022-11-16 PROCEDURE — 25000003 PHARM REV CODE 250: Performed by: HOSPITALIST

## 2022-11-16 PROCEDURE — 83735 ASSAY OF MAGNESIUM: CPT | Performed by: NURSE PRACTITIONER

## 2022-11-16 RX ORDER — NAPROXEN SODIUM 220 MG/1
81 TABLET, FILM COATED ORAL DAILY
Status: DISCONTINUED | OUTPATIENT
Start: 2022-11-17 | End: 2022-12-01 | Stop reason: HOSPADM

## 2022-11-16 RX ADMIN — INSULIN ASPART 2 UNITS: 100 INJECTION, SOLUTION INTRAVENOUS; SUBCUTANEOUS at 12:11

## 2022-11-16 RX ADMIN — MORPHINE SULFATE 4 MG: 4 INJECTION, SOLUTION INTRAMUSCULAR; INTRAVENOUS at 10:11

## 2022-11-16 RX ADMIN — FOLIC ACID 1 MG: 1 TABLET ORAL at 09:11

## 2022-11-16 RX ADMIN — CLINDAMYCIN PHOSPHATE 900 MG: 900 INJECTION, SOLUTION INTRAVENOUS at 05:11

## 2022-11-16 RX ADMIN — METRONIDAZOLE 500 MG: 500 INJECTION, SOLUTION INTRAVENOUS at 09:11

## 2022-11-16 RX ADMIN — METRONIDAZOLE 500 MG: 500 INJECTION, SOLUTION INTRAVENOUS at 01:11

## 2022-11-16 RX ADMIN — INSULIN ASPART 2 UNITS: 100 INJECTION, SOLUTION INTRAVENOUS; SUBCUTANEOUS at 11:11

## 2022-11-16 RX ADMIN — CEFEPIME HYDROCHLORIDE 1 G: 1 INJECTION, SOLUTION INTRAVENOUS at 09:11

## 2022-11-16 RX ADMIN — ACETAMINOPHEN 650 MG: 325 TABLET, FILM COATED ORAL at 09:11

## 2022-11-16 RX ADMIN — CLINDAMYCIN PHOSPHATE 900 MG: 900 INJECTION, SOLUTION INTRAVENOUS at 01:11

## 2022-11-16 RX ADMIN — THERA TABS 1 TABLET: TAB at 09:11

## 2022-11-16 RX ADMIN — INSULIN ASPART 6 UNITS: 100 INJECTION, SOLUTION INTRAVENOUS; SUBCUTANEOUS at 06:11

## 2022-11-16 RX ADMIN — SODIUM CHLORIDE: 0.9 INJECTION, SOLUTION INTRAVENOUS at 12:11

## 2022-11-16 RX ADMIN — VANCOMYCIN HYDROCHLORIDE 1750 MG: 500 INJECTION, POWDER, LYOPHILIZED, FOR SOLUTION INTRAVENOUS at 06:11

## 2022-11-16 RX ADMIN — CLINDAMYCIN PHOSPHATE 900 MG: 900 INJECTION, SOLUTION INTRAVENOUS at 09:11

## 2022-11-16 RX ADMIN — MUPIROCIN: 20 OINTMENT TOPICAL at 09:11

## 2022-11-16 RX ADMIN — DIGOXIN 125 MCG: 125 TABLET ORAL at 09:11

## 2022-11-16 RX ADMIN — METRONIDAZOLE 500 MG: 500 INJECTION, SOLUTION INTRAVENOUS at 05:11

## 2022-11-16 RX ADMIN — INSULIN DETEMIR 25 UNITS: 100 INJECTION, SOLUTION SUBCUTANEOUS at 09:11

## 2022-11-16 RX ADMIN — EZETIMIBE 10 MG: 10 TABLET ORAL at 09:11

## 2022-11-16 RX ADMIN — MORPHINE SULFATE 4 MG: 4 INJECTION, SOLUTION INTRAMUSCULAR; INTRAVENOUS at 05:11

## 2022-11-16 RX ADMIN — INSULIN ASPART 1 UNITS: 100 INJECTION, SOLUTION INTRAVENOUS; SUBCUTANEOUS at 12:11

## 2022-11-16 RX ADMIN — INSULIN ASPART 2 UNITS: 100 INJECTION, SOLUTION INTRAVENOUS; SUBCUTANEOUS at 06:11

## 2022-11-16 RX ADMIN — MORPHINE SULFATE 4 MG: 4 INJECTION, SOLUTION INTRAMUSCULAR; INTRAVENOUS at 07:11

## 2022-11-16 RX ADMIN — MORPHINE SULFATE 4 MG: 4 INJECTION, SOLUTION INTRAMUSCULAR; INTRAVENOUS at 11:11

## 2022-11-16 NOTE — HPI
The patient is a 67M with a past medical history of DM2, CAD, HTN, and HLD who presents with scrotal pain and swelling. Started about 4 weeks ago and has been progressively worsening despite a couple courses of antibiotics (unsure which ones). He reports fevers and chills at home, nausea, decreased PO intake, and multiple syncopal episodes with +incontinence of stool. Unable to urinate for the past 24 hours or so. Sugars 190s at home. He was seen yesterday in urgent care and referred to the ER, but says he wasn't able to make it at that time so he comes today instead. On initial workup, the patient has CT evidence of Fourniers gangrene.  He will be brought to the operating room emergently and admitted after to ICU for further management.

## 2022-11-16 NOTE — ASSESSMENT & PLAN NOTE
This patient does have evidence of infective focus  My overall impression is sepsis. Vital signs were reviewed and noted in progress note.  Antibiotics given-   Antibiotics (From admission, onward)    Start     Stop Route Frequency Ordered    11/16/22 0900  cefepime in dextrose 5 % 1 gram/50 mL IVPB 1 g         -- IV Every 12 hours (non-standard times) 11/15/22 1950    11/16/22 0130  clindamycin in D5W 900 mg/50 mL IVPB 900 mg  (ED Adult Sepsis Treatment)         -- IV Every 8 hours (non-standard times) 11/15/22 1944    11/15/22 2100  metronidazole IVPB 500 mg         -- IV Every 8 hours (non-standard times) 11/15/22 1954    11/15/22 2053  vancomycin - pharmacy to dose  (vancomycin IVPB)        See Hyperspace for full Linked Orders Report.    -- IV pharmacy to manage frequency 11/15/22 1954    11/15/22 1730  vancomycin 2 g in dextrose 5 % 500 mL IVPB  (ED Adult Sepsis Treatment)         -- IV Once 11/15/22 1729        Cultures were taken-   Microbiology Results (last 7 days)     Procedure Component Value Units Date/Time    Blood culture x two cultures. Draw prior to antibiotics. [[id number]] Collected: 11/15/22 1742    Order Status: Sent Specimen: Blood from Peripheral, Forearm, Left Updated: 11/15/22 1742    Blood culture x two cultures. Draw prior to antibiotics. [[id number]] Collected: 11/15/22 1741    Order Status: Sent Specimen: Blood from Peripheral, Antecubital, Right Updated: 11/15/22 1741    Influenza A & B by Molecular [[id number]]     Order Status: Canceled Specimen: Nasopharyngeal Swab         Latest lactate reviewed, they are-  Recent Labs   Lab 11/15/22  1740   LACTATE 4.4*       Organ dysfunction indicated by Acute kidney injury  Source- Likely due to scrotal infection/Fourniers gangrene    Source control Achieved by emergent surgical management

## 2022-11-16 NOTE — ASSESSMENT & PLAN NOTE
66 yo male who presented with Mary's. S/p excision and debridement of scrotum 11/15/22.    - Admitted to ICU under hospital medicine for sepsis protocol  - Wound clean and intact. Dressing changed on am rounds.  - Continue broad spectrum antibiotics  - Follow up cultures  - Fluid resuscitation  - Dressing changes minimum BID with assessment of wounds  - No anticipated return to operating room today 11/16/22 based on morning assessment of wound. Subject to change  - Rest of care per primary

## 2022-11-16 NOTE — NURSING
Pt arrived to unit via stretcher. Aox4. No apparent distress noted. Denies pain at this time. 3L O2 via NC. Incision noted to scrotum with packing dressing and mesh panties. Drainage noted to dressing. Ricketts cath present. Redness noted to sacral area, mepilex applied for protection. See flow sheet for full assessment. Pt oriented to unit. Call light within reach. Safety precautions ensured. Plan of care reviewed with pt. Will continue to monitor pt closely.      Nurses Note -- 4 Eyes      11/16/2022   10:15 pm      Skin assessed during: Admit      [x] Yes- Altered Skin Integrity Present or Discovered   [] LDA Added if Not in Epic (Describe Wound)   [x] New Altered Skin Integrity was Present on Admit and Documented in LDA   [] Wound Image Taken    Wound Care Consulted? Yes    Attending Nurse:  Timoteo Leal, RN     Second RN/Staff Member:  Mariluz Light

## 2022-11-16 NOTE — OP NOTE
Operative Note       Surgery Date: 11/15/2022     Surgeon: Chinmay Hatch MD    Assistant Surgeon: None    Pre-op Diagnosis:  Mary's Gangrene of Scrotum    Post-op Diagnosis:   Mary's Gangrene of Scrotum    Procedures:  Excision and debridement of necrotic scrotal tissue (Mary's Gangrene)    Anesthesia: General    Indications for Procedure:  The patient is a 67 y.o. year old male with Mary's gangrene.  He presents today for surgical treatment with excision and debridement.  The full risks and benefits of the procedure have been explained and detail and he wishes to proceed.    Procedure Description:  The patient was brought to the operative suite and placed under general anesthesia. He was placed in lithotomy position and prepped and draped in usual sterile fashion.  Time out was performed prior to starting the procedure.    An incision was made in the left hemiscrotum over the area with crepitus. A large quantity of thick brown pus immediately drained through the incision. Swabs were obtained for culture. A central area of skin was excised until bleeding skin edges were noted. The exposed cavity was further developed with blunt dissection and extended into left groin and left perineum. The skin was further incised in these areas to better expose the underlying tissue. Most of the incised skin edges had normal bleeding without evidence of necrosis. There was necrotic tissue noted in some of the underlying fascia, and this was all excised sharply until bleeding tissue was encountered. The tunica vaginalis was visualized but appeared healthy and there was not opened. A further area of necrotic appearing skin and underlying tissue was noted medially and this was similarly excised.     Once all apparent necrotic tissue was excised and debrided, the wound was copiously irrigated. Electrocautery was used on some of the healthy skin edges to achieve hemostasis. The wound was then packed with kerlex guaze (3  rolls), 4x4 gauze, and then covered with ABD pads.     A 16 Telugu mead was placed without difficulty. Mesh underwear was placed to hold the packing and ABD pads in place. His anesthesia was reversed and he was transferred to ICU in stable condition.    Complications: No    Estimated Blood Loss: 30cc         Specimens Removed:   Specimen (24h ago, onward)      None            Implants: * No implants in log *           Disposition: ICU - extubated and stable.           Condition: Good    Attestation:  I performed the procedure.

## 2022-11-16 NOTE — HOSPITAL COURSE
Mr. Wagner presented with scrotal pain and swelling.  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  Empiric initiated with cefepime, clindamycin, metronidazole, and vancomycin.  Urology consulted and he underwent emergent excision and debridement of necrotic tissue on 11/15. Repeat washout performed 11/17. Improved and stepped down from ICU 11/18.  Bedside debridement performed on 11/22.  ID consulted and antibiotics regimen changed to cefepime and Flagyl known.  Wound cultures with Anaerococcus and Prevotella species.  Renal function normalized.  Return of SVT and Cardiology consulted with start of metoprolol.  Renal function normalized.  Re ID gave final abx recs (Continue cefepime and flagyl) and will follow at LT[facility].  Continue wound care.  Patient discharged to LTAC

## 2022-11-16 NOTE — ANESTHESIA PREPROCEDURE EVALUATION
11/15/2022  Nithin Wagner is a 67 y.o., male.      Pre-op Assessment    I have reviewed the Patient Summary Reports.     I have reviewed the Nursing Notes.    I have reviewed the Medications.     Review of Systems  Anesthesia Hx:  Denies Family Hx of Anesthesia complications.  Personal Hx of Anesthesia complications  Difficult Intubation   Cardiovascular:   Hypertension CAD  CABG/stent  CHF    Renal/:   Chronic Renal Disease    Hepatic/GI:   Liver Disease, Hepatitis, B    Neurological:   CVA    Endocrine:   Diabetes        Physical Exam  General: Well nourished, Cooperative, Alert and Oriented    Airway:  Mallampati: III   Mouth Opening: Normal  TM Distance: Normal  Tongue: Normal  Neck ROM: Normal ROM    Dental:  Intact        Anesthesia Plan  Type of Anesthesia, risks & benefits discussed:    Anesthesia Type: Gen ETT  Intra-op Monitoring Plan: Standard ASA Monitors  Post Op Pain Control Plan: multimodal analgesia  Induction:  IV  Airway Plan: Video  Informed Consent: Informed consent signed with the Patient and all parties understand the risks and agree with anesthesia plan.  All questions answered.   ASA Score: 3 Emergent    Ready For Surgery From Anesthesia Perspective.     .

## 2022-11-16 NOTE — SUBJECTIVE & OBJECTIVE
Interval History: NAEO. AFVSS. Mild tachycardia since surgery. Cr improved, WBC stable. Pain controlled. Dressings changed overnight due to soaking through.    Review of Systems  Objective:     Temp:  [98.4 °F (36.9 °C)-99.9 °F (37.7 °C)] 98.7 °F (37.1 °C)  Pulse:  [] 96  Resp:  [16-33] 20  SpO2:  [92 %-98 %] 95 %  BP: ()/(46-76) 119/60     Body mass index is 35.08 kg/m².           Drains       Drain  Duration                  Urethral Catheter 11/15/22 2145 Non-latex 16 Fr. <1 day                    Physical Exam  Vitals and nursing note reviewed.   Constitutional:       General: He is not in acute distress.     Appearance: Normal appearance.   HENT:      Head: Atraumatic.      Nose: Nose normal.   Eyes:      Extraocular Movements: Extraocular movements intact.      Pupils: Pupils are equal, round, and reactive to light.   Cardiovascular:      Rate and Rhythm: Normal rate.   Pulmonary:      Effort: Pulmonary effort is normal.   Abdominal:      General: Abdomen is flat.   Genitourinary:     Comments: Open wound with kerlex and ABD pads. Approximately 40% of scrotum removed. Clean wound edges with healthy appearing tissue. No active bleeding source.  Musculoskeletal:         General: Normal range of motion.      Cervical back: Normal range of motion.   Neurological:      General: No focal deficit present.      Mental Status: He is alert and oriented to person, place, and time. Mental status is at baseline.   Psychiatric:         Mood and Affect: Mood normal.         Behavior: Behavior normal.       Significant Labs:    BMP:  Recent Labs   Lab 11/15/22  1740 11/15/22  2248 11/16/22  0549   * 131* 133*   K 6.1* 5.0 5.1   CL 94* 98 99   CO2 23 25 26   BUN 44* 37* 33*   CREATININE 2.5* 1.5* 1.2   CALCIUM 9.3 8.4* 8.4*       CBC:   Recent Labs   Lab 11/15/22  1740 11/15/22  2248 11/16/22  0549   WBC 22.42* 21.85* 24.13*   HGB 16.9 14.9 14.1   HCT 49.1 44.4 42.3    188 190       Urine Studies: No  results for input(s): COLORU, APPEARANCEUA, PHUR, SPECGRAV, PROTEINUA, GLUCUA, KETONESU, BILIRUBINUA, OCCULTUA, NITRITE, UROBILINOGEN, LEUKOCYTESUR, RBCUA, WBCUA, BACTERIA, SQUAMEPITHEL, HYALINECASTS in the last 168 hours.    Invalid input(s): SANDRA    Significant Imaging:  CT: I have reviewed all results within the past 24 hours and my personal findings are:  As per HPI.  Air in left hemiscrotum prior to surgery.

## 2022-11-16 NOTE — ANESTHESIA POSTPROCEDURE EVALUATION
Anesthesia Post Evaluation    Patient: Nithin Wagner    Procedure(s) Performed: Procedure(s) (LRB):  INCISION AND DRAINAGE, SCROTUM (N/A)    Final Anesthesia Type: general      Patient location during evaluation: ICU  Patient participation: Yes- Able to Participate  Level of consciousness: awake  Post-procedure vital signs: reviewed and stable  Airway patency: patent      Anesthetic complications: no      Cardiovascular status: blood pressure returned to baseline  Respiratory status: nasal cannula  Hydration status: euvolemic  Follow-up not needed.          Vitals Value Taken Time   /63 11/15/22 2221   Temp 37.1 °C (98.7 °F) 11/15/22 2001   Pulse 102 11/15/22 2229   Resp 28 11/15/22 2229   SpO2 93 % 11/15/22 2229   Vitals shown include unvalidated device data.      No case tracking events are documented in the log.      Pain/Renee Score: No data recorded

## 2022-11-16 NOTE — ASSESSMENT & PLAN NOTE
K- 6.1, patient with new DONNA, takes KCL supplementation daily along with aldactone.  Will hold all the above. Expect improvement with holding meds and treatment of DONNA

## 2022-11-16 NOTE — SUBJECTIVE & OBJECTIVE
Past Medical History:   Diagnosis Date    Arthritis     Atrial myxoma     CHF (congestive heart failure)     Coronary atherosclerosis     Diabetes mellitus, type 2     Difficult intubation     Hepatitis B     Hyperlipidemia     Hypertension     Non-alcoholic fatty liver disease     Rheumatoid arthritis     Rheumatoid arthritis flare 07/12/2021    Stroke     TIA    Systolic heart failure        Past Surgical History:   Procedure Laterality Date    CORONARY ANGIOGRAPHY N/A 3/10/2021    Procedure: ANGIOGRAM, CORONARY ARTERY - right radial;  Surgeon: Shemar Dempsey MD;  Location: [facility] CATH LAB;  Service: Cardiology;  Laterality: N/A;    CORONARY STENT PLACEMENT  03/10/2021    prox-mid RCA [place] 4.5 x 26 mm, 4.5 x 12 mm    LUNG LOBECTOMY Right 2008    RUL lobectomy after removal of atrial myxoma    PLEURA BIOPSY      RESECTION OF ATRIAL MYXOMA  2007       Review of patient's allergies indicates:   Allergen Reactions    Enbrel [etanercept] Shortness Of Breath     CHF    Nsaids (non-steroidal anti-inflammatory drug) Other (See Comments)     hypertention  Other reaction(s): Other (See Comments)  hypertention  HTN    Statins-hmg-coa reductase inhibitors Other (See Comments)     Heart arrythemias  Other reaction(s): Other (See Comments)  Heart arrythemias  Joint pain and cardiac arrythmias    Pcn [penicillins] Rash       No current facility-administered medications on file prior to encounter.     Current Outpatient Medications on File Prior to Encounter   Medication Sig    aspirin 81 MG Chew Take 81 mg by mouth.    blood sugar diagnostic Strp To check BG 3 times daily, to use with insurance preferred meter    blood-glucose meter kit Use to check glucose as directed    ciclopirox (PENLAC) 8 % Soln Apply topically nightly.    clotrimazole (LOTRIMIN) 1 % cream Apply topically 2 (two) times daily.    digoxin (LANOXIN) 125 mcg tablet Take 1 tablet (125 mcg total) by mouth once daily.    dulaglutide (TRULICITY) 4.5 mg/0.5 mL pen  injector Inject 4.5 mg into the skin every 7 days.    ergocalciferol (ERGOCALCIFEROL) 50,000 unit Cap Take 1 capsule (50,000 Units total) by mouth every 7 days.    ezetimibe (ZETIA) 10 mg tablet TAKE 1 TABLET BY MOUTH EVERY DAY    folic acid (FOLVITE) 1 MG tablet     furosemide (LASIX) 40 MG tablet Take 1 tablet (40 mg total) by mouth once daily. (Patient taking differently: Take 40 mg by mouth every other day. Alternates 40mg and 80mg every other day)    hydroCHLOROthiazide (HYDRODIURIL) 25 MG tablet Take 1 tablet (25 mg total) by mouth once daily. (Patient taking differently: Take 25 mg by mouth as needed.)    hydrocodone-acetaminophen 10-325mg (NORCO)  mg Tab     imiquimod (ALDARA) 5 % cream APPLY 3 DAYS A WEEK TO WARTS UNTIL COMPLETE CLEARANCE OF WARTS    insulin admin supplies InPn InPen device, use to inject mealtime insulin 3 times daily. 1 pen to use, 1 for backup as needed.    insulin degludec (TRESIBA FLEXTOUCH U-200) 200 unit/mL (3 mL) insulin pen Inject 50 Units into the skin 2 (two) times a day.    lamiVUDine (EPIVIR) 150 MG Tab Take 1 tablet (150 mg total) by mouth once daily.    LINZESS 72 mcg Cap capsule     minocycline (MINOCIN,DYNACIN) 100 MG capsule Take 100 mg by mouth 2 (two) times daily.    multivitamin (THERAGRAN) per tablet Take 1 tablet by mouth once daily.    nitroGLYCERIN (NITROSTAT) 0.4 MG SL tablet Place 1 tablet (0.4 mg total) under the tongue every 5 (five) minutes as needed for Chest pain.    NOVOLOG PENFILL U-100 INSULIN 100 unit/mL Crtg INJECT 30 UNITS INTO THE SKIN THREE TIMES A DAY WITH MEALS. MAX DAILY DOSE 100 UNITS PER DAY    nystatin (MYCOSTATIN) cream Apply topically 3 (three) times daily as needed for Dry Skin.    olmesartan (BENICAR) 40 MG tablet Take 1 tablet (40 mg total) by mouth nightly.    ondansetron (ZOFRAN) 4 MG tablet Take 1 tablet (4 mg total) by mouth every 8 (eight) hours as needed for Nausea.    OXYGEN-AIR DELIVERY SYSTEMS MISC by Misc.(Non-Drug; Combo  "Route) route.    pen needle, diabetic (BD ULTRA-FINE RAHUL PEN NEEDLE) 32 gauge x 5/32" Ndle Use to inject insulin 5 times daily    potassium chloride (MICRO-K) 10 MEQ CpSR Take 10 mEq by mouth once daily.    predniSONE (DELTASONE) 10 MG tablet Take 1 tablet (10 mg total) by mouth once daily.    predniSONE (DELTASONE) 2.5 MG tablet Take 1 tablet (2.5 mg total) by mouth once daily.    predniSONE (DELTASONE) 2.5 MG tablet TAKE 3 TABLETS (7.5 MG TOTAL) BY MOUTH ONCE DAILY.    predniSONE (DELTASONE) 5 MG tablet TAKE 1 TABLET BY MOUTH EVERY DAY    PROAIR HFA 90 mcg/actuation inhaler     spironolactone (ALDACTONE) 25 MG tablet TAKE 1 TABLET BY MOUTH EVERY DAY    sulfamethoxazole-trimethoprim 800-160mg (BACTRIM DS) 800-160 mg Tab TAKE 1 TABLET BY MOUTH ON MON,WEDS, FRIDAY    vortioxetine (TRINTELLIX) 10 mg Tab Take 10 mg by mouth once daily at 6am.     Family History       Problem Relation (Age of Onset)    Cancer Sister    Diabetes type I Father    Diabetes type II Mother    Hodgkin's lymphoma Mother    Kidney failure Father          Tobacco Use    Smoking status: Some Days     Packs/day: 1.00     Years: 35.00     Pack years: 35.00     Types: Cigarettes    Smokeless tobacco: Never   Substance and Sexual Activity    Alcohol use: Not Currently    Drug use: No    Sexual activity: Never     Review of Systems   Unable to perform ROS: Other   Objective:     Vital Signs (Most Recent):  Temp: 98.4 °F (36.9 °C) (11/15/22 2230)  Pulse: 101 (11/15/22 2236)  Resp: 20 (11/15/22 2236)  BP: (!) 107/58 (11/15/22 1950)  SpO2: 96 % (11/15/22 2236)   Vital Signs (24h Range):  Temp:  [98.4 °F (36.9 °C)-99.9 °F (37.7 °C)] 98.4 °F (36.9 °C)  Pulse:  [100-117] 101  Resp:  [20-33] 20  SpO2:  [92 %-98 %] 96 %  BP: ()/(46-76) 107/58     Weight: 114.1 kg (251 lb 8.7 oz)  Body mass index is 35.08 kg/m².    Physical Exam  Constitutional:       General: He is sleeping.      Appearance: He is well-developed. He is obese.   HENT:      Head: " Normocephalic.   Eyes:      Conjunctiva/sclera: Conjunctivae normal.   Cardiovascular:      Rate and Rhythm: Regular rhythm. Tachycardia present.      Pulses:           Radial pulses are 2+ on the right side and 2+ on the left side.      Heart sounds: Normal heart sounds.   Pulmonary:      Effort: Pulmonary effort is normal.      Breath sounds: Examination of the right-lower field reveals decreased breath sounds. Examination of the left-lower field reveals decreased breath sounds. Decreased breath sounds present.   Abdominal:      General: Bowel sounds are decreased. There is no distension.      Palpations: Abdomen is soft.      Tenderness: There is no abdominal tenderness.   Musculoskeletal:         General: Normal range of motion.      Cervical back: Normal range of motion and neck supple.   Skin:     General: Skin is warm and dry.      Comments: Packing noted to left scrotum   Neurological:      Mental Status: He is oriented to person, place, and time. He is lethargic.      GCS: GCS eye subscore is 2. GCS verbal subscore is 5. GCS motor subscore is 6.      Motor: Motor function is intact.           Significant Labs: All pertinent labs within the past 24 hours have been reviewed.  CBC:   Recent Labs   Lab 11/15/22  1740   WBC 22.42*   HGB 16.9   HCT 49.1        CMP:   Recent Labs   Lab 11/15/22  1740   *   K 6.1*   CL 94*   CO2 23   *   BUN 44*   CREATININE 2.5*   CALCIUM 9.3   PROT 6.5   ALBUMIN 2.6*   BILITOT 1.1*   ALKPHOS 95   AST 77*   ALT 54*   ANIONGAP 14       Significant Imaging: I have reviewed all pertinent imaging results/findings within the past 24 hours.

## 2022-11-16 NOTE — PROGRESS NOTES
Pharmacokinetic Initial Assessment: IV Vancomycin    Assessment/Plan:    Initiate intravenous vancomycin with loading dose of 2000 mg once with subsequent doses when random concentrations are less than 20 mcg/mL  Desired empiric serum trough concentration is 10 to 20 mcg/mL  Draw vancomycin random level on 11/16 at 1700.  Pharmacy will continue to follow and monitor vancomycin.      Please contact pharmacy at extension [phone] with any questions regarding this assessment.     Thank you for the consult,   Becca Durhamkristian       Patient brief summary:  Nithin Wagner is a 67 y.o. male initiated on antimicrobial therapy with IV Vancomycin for treatment of suspected skin & soft tissue infection    Drug Allergies:   Review of patient's allergies indicates:   Allergen Reactions    Enbrel [etanercept] Shortness Of Breath     CHF    Nsaids (non-steroidal anti-inflammatory drug) Other (See Comments)     hypertention  Other reaction(s): Other (See Comments)  hypertention  HTN    Statins-hmg-coa reductase inhibitors Other (See Comments)     Heart arrythemias  Other reaction(s): Other (See Comments)  Heart arrythemias  Joint pain and cardiac arrythmias    Pcn [penicillins] Rash       Actual Body Weight:   109.8 KG    Renal Function:   Estimated Creatinine Clearance: 35.7 mL/min (A) (based on SCr of 2.5 mg/dL (H)).,     Dialysis Method (if applicable):  N/A    CBC (last 72 hours):  Recent Labs   Lab Result Units 11/15/22  1740   WBC K/uL 22.42*   Hemoglobin g/dL 16.9   Hematocrit % 49.1   Platelets K/uL 230   Gran % % 76.0*   Lymph % % 8.0*   Mono % % 10.0   Eosinophil % % 0.0   Basophil % % 0.0   Differential Method  Manual       Metabolic Panel (last 72 hours):  Recent Labs   Lab Result Units 11/15/22  1740   Sodium mmol/L 131*   Potassium mmol/L 6.1*   Chloride mmol/L 94*   CO2 mmol/L 23   Glucose mg/dL 255*   BUN mg/dL 44*   Creatinine mg/dL 2.5*   Albumin g/dL 2.6*   Total Bilirubin mg/dL 1.1*   Alkaline Phosphatase U/L 95   AST  U/L 77*   ALT U/L 54*       Drug levels (last 3 results):  No results for input(s): VANCOMYCINRA, VANCORANDOM, VANCOMYCINPE, VANCOPEAK, VANCOMYCINTR, VANCOTROUGH in the last 72 hours.    Microbiologic Results:  Microbiology Results (last 7 days)       Procedure Component Value Units Date/Time    Blood culture x two cultures. Draw prior to antibiotics. [[id number]] Collected: 11/15/22 1742    Order Status: Sent Specimen: Blood from Peripheral, Forearm, Left Updated: 11/15/22 1742    Blood culture x two cultures. Draw prior to antibiotics. [[id number]] Collected: 11/15/22 1741    Order Status: Sent Specimen: Blood from Peripheral, Antecubital, Right Updated: 11/15/22 1741    Influenza A & B by Molecular [[id number]]     Order Status: Canceled Specimen: Nasopharyngeal Swab

## 2022-11-16 NOTE — H&P
[facility] Intensive Care [facility] Medicine  History & Physical    Patient Name: Nithin Wagner  MRN: [id number]  Patient Class: IP- Inpatient  Admission Date: 11/15/2022  Attending Physician: Darby Obrien MD   Primary Care Provider: Tushar Drew MD         Patient information was obtained from patient, past medical records and ER records.     Subjective:     Principal Problem:Sepsis    Chief Complaint:   Chief Complaint   Patient presents with    Groin Swelling     Scrotal swelling, redness and fever x 4 weeks.         HPI: The patient is a 67M with a past medical history of DM2, CAD, HTN, and HLD who presents with scrotal pain and swelling. Started about 4 weeks ago and has been progressively worsening despite a couple courses of antibiotics (unsure which ones). He reports fevers and chills at home, nausea, decreased PO intake, and multiple syncopal episodes with +incontinence of stool. Unable to urinate for the past 24 hours or so. Sugars 190s at home. He was seen yesterday in urgent care and referred to the ER, but says he wasn't able to make it at that time so he comes today instead. On initial workup, the patient has CT evidence of Fourniers gangrene.  He will be brought to the operating room emergently and admitted after to ICU for further management.      Past Medical History:   Diagnosis Date    Arthritis     Atrial myxoma     CHF (congestive heart failure)     Coronary atherosclerosis     Diabetes mellitus, type 2     Difficult intubation     Hepatitis B     Hyperlipidemia     Hypertension     Non-alcoholic fatty liver disease     Rheumatoid arthritis     Rheumatoid arthritis flare 07/12/2021    Stroke     TIA    Systolic heart failure        Past Surgical History:   Procedure Laterality Date    CORONARY ANGIOGRAPHY N/A 3/10/2021    Procedure: ANGIOGRAM, CORONARY ARTERY - right radial;  Surgeon: Shemar Dempsey MD;  Location: [facility] [place] [facility] by [facility] CATH LAB;  Service: Cardiology;  Laterality:  N/A;    CORONARY STENT PLACEMENT  03/10/2021    prox-mid RCA [place] 4.5 x 26 mm, 4.5 x 12 mm    LUNG LOBECTOMY Right 2008    RUL lobectomy after removal of atrial myxoma    PLEURA BIOPSY      RESECTION OF ATRIAL MYXOMA  2007       Review of patient's allergies indicates:   Allergen Reactions    Enbrel [etanercept] Shortness Of Breath     CHF    Nsaids (non-steroidal anti-inflammatory drug) Other (See Comments)     hypertention  Other reaction(s): Other (See Comments)  hypertention  HTN    Statins-hmg-coa reductase inhibitors Other (See Comments)     Heart arrythemias  Other reaction(s): Other (See Comments)  Heart arrythemias  Joint pain and cardiac arrythmias    Pcn [penicillins] Rash       No current facility-administered medications on file prior to encounter.     Current Outpatient Medications on File Prior to Encounter   Medication Sig    aspirin 81 MG Chew Take 81 mg by mouth.    blood sugar diagnostic Strp To check BG 3 times daily, to use with insurance preferred meter    blood-glucose meter kit Use to check glucose as directed    ciclopirox (PENLAC) 8 % Soln Apply topically nightly.    clotrimazole (LOTRIMIN) 1 % cream Apply topically 2 (two) times daily.    digoxin (LANOXIN) 125 mcg tablet Take 1 tablet (125 mcg total) by mouth once daily.    dulaglutide (TRULICITY) 4.5 mg/0.5 mL pen injector Inject 4.5 mg into the skin every 7 days.    ergocalciferol (ERGOCALCIFEROL) 50,000 unit Cap Take 1 capsule (50,000 Units total) by mouth every 7 days.    ezetimibe (ZETIA) 10 mg tablet TAKE 1 TABLET BY MOUTH EVERY DAY    folic acid (FOLVITE) 1 MG tablet     furosemide (LASIX) 40 MG tablet Take 1 tablet (40 mg total) by mouth once daily. (Patient taking differently: Take 40 mg by mouth every other day. Alternates 40mg and 80mg every other day)    hydroCHLOROthiazide (HYDRODIURIL) 25 MG tablet Take 1 tablet (25 mg total) by mouth once daily. (Patient taking differently: Take 25 mg by mouth as needed.)  "   hydrocodone-acetaminophen 10-325mg (NORCO)  mg Tab     imiquimod (ALDARA) 5 % cream APPLY 3 DAYS A WEEK TO WARTS UNTIL COMPLETE CLEARANCE OF WARTS    insulin admin supplies InPn InPen device, use to inject mealtime insulin 3 times daily. 1 pen to use, 1 for backup as needed.    insulin degludec (TRESIBA FLEXTOUCH U-200) 200 unit/mL (3 mL) insulin pen Inject 50 Units into the skin 2 (two) times a day.    lamiVUDine (EPIVIR) 150 MG Tab Take 1 tablet (150 mg total) by mouth once daily.    LINZESS 72 mcg Cap capsule     minocycline (MINOCIN,DYNACIN) 100 MG capsule Take 100 mg by mouth 2 (two) times daily.    multivitamin (THERAGRAN) per tablet Take 1 tablet by mouth once daily.    nitroGLYCERIN (NITROSTAT) 0.4 MG SL tablet Place 1 tablet (0.4 mg total) under the tongue every 5 (five) minutes as needed for Chest pain.    NOVOLOG PENFILL U-100 INSULIN 100 unit/mL Crtg INJECT 30 UNITS INTO THE SKIN THREE TIMES A DAY WITH MEALS. MAX DAILY DOSE 100 UNITS PER DAY    nystatin (MYCOSTATIN) cream Apply topically 3 (three) times daily as needed for Dry Skin.    olmesartan (BENICAR) 40 MG tablet Take 1 tablet (40 mg total) by mouth nightly.    ondansetron (ZOFRAN) 4 MG tablet Take 1 tablet (4 mg total) by mouth every 8 (eight) hours as needed for Nausea.    OXYGEN-AIR DELIVERY SYSTEMS MISC by [facility].(Non-Drug; Combo Route) route.    pen needle, diabetic (BD ULTRA-FINE RAHUL PEN NEEDLE) 32 gauge x 5/32" Ndle Use to inject insulin 5 times daily    potassium chloride (MICRO-K) 10 MEQ CpSR Take 10 mEq by mouth once daily.    predniSONE (DELTASONE) 10 MG tablet Take 1 tablet (10 mg total) by mouth once daily.    predniSONE (DELTASONE) 2.5 MG tablet Take 1 tablet (2.5 mg total) by mouth once daily.    predniSONE (DELTASONE) 2.5 MG tablet TAKE 3 TABLETS (7.5 MG TOTAL) BY MOUTH ONCE DAILY.    predniSONE (DELTASONE) 5 MG tablet TAKE 1 TABLET BY MOUTH EVERY DAY    PROAIR HFA 90 mcg/actuation inhaler     " spironolactone (ALDACTONE) 25 MG tablet TAKE 1 TABLET BY MOUTH EVERY DAY    sulfamethoxazole-trimethoprim 800-160mg (BACTRIM DS) 800-160 mg Tab TAKE 1 TABLET BY MOUTH ON MON,WEDS, FRIDAY    vortioxetine (TRINTELLIX) 10 mg Tab Take 10 mg by mouth once daily at 6am.     Family History       Problem Relation (Age of Onset)    Cancer Sister    Diabetes type I Father    Diabetes type II Mother    Hodgkin's lymphoma Mother    Kidney failure Father          Tobacco Use    Smoking status: Some Days     Packs/day: 1.00     Years: 35.00     Pack years: 35.00     Types: Cigarettes    Smokeless tobacco: Never   Substance and Sexual Activity    Alcohol use: Not Currently    Drug use: No    Sexual activity: Never     Review of Systems   Unable to perform ROS: Other   Objective:     Vital Signs (Most Recent):  Temp: 98.4 °F (36.9 °C) (11/15/22 2230)  Pulse: 101 (11/15/22 2236)  Resp: 20 (11/15/22 2236)  BP: (!) 107/58 (11/15/22 1950)  SpO2: 96 % (11/15/22 2236)   Vital Signs (24h Range):  Temp:  [98.4 °F (36.9 °C)-99.9 °F (37.7 °C)] 98.4 °F (36.9 °C)  Pulse:  [100-117] 101  Resp:  [20-33] 20  SpO2:  [92 %-98 %] 96 %  BP: ()/(46-76) 107/58     Weight: 114.1 kg (251 lb 8.7 oz)  Body mass index is 35.08 kg/m².    Physical Exam  Constitutional:       General: He is sleeping.      Appearance: He is well-developed. He is obese.   HENT:      Head: Normocephalic.   Eyes:      Conjunctiva/sclera: Conjunctivae normal.   Cardiovascular:      Rate and Rhythm: Regular rhythm. Tachycardia present.      Pulses:           Radial pulses are 2+ on the right side and 2+ on the left side.      Heart sounds: Normal heart sounds.   Pulmonary:      Effort: Pulmonary effort is normal.      Breath sounds: Examination of the right-lower field reveals decreased breath sounds. Examination of the left-lower field reveals decreased breath sounds. Decreased breath sounds present.   Abdominal:      General: Bowel sounds are decreased. There is no  distension.      Palpations: Abdomen is soft.      Tenderness: There is no abdominal tenderness.   Musculoskeletal:         General: Normal range of motion.      Cervical back: Normal range of motion and neck supple.   Skin:     General: Skin is warm and dry.      Comments: Packing noted to left scrotum   Neurological:      Mental Status: He is oriented to person, place, and time. He is lethargic.      GCS: GCS eye subscore is 2. GCS verbal subscore is 5. GCS motor subscore is 6.      Motor: Motor function is intact.           Significant Labs: All pertinent labs within the past 24 hours have been reviewed.  CBC:   Recent Labs   Lab 11/15/22  1740   WBC 22.42*   HGB 16.9   HCT 49.1        CMP:   Recent Labs   Lab 11/15/22  1740   *   K 6.1*   CL 94*   CO2 23   *   BUN 44*   CREATININE 2.5*   CALCIUM 9.3   PROT 6.5   ALBUMIN 2.6*   BILITOT 1.1*   ALKPHOS 95   AST 77*   ALT 54*   ANIONGAP 14       Significant Imaging: I have reviewed all pertinent imaging results/findings within the past 24 hours.    Assessment/Plan:     * Sepsis  This patient does have evidence of infective focus  My overall impression is sepsis. Vital signs were reviewed and noted in progress note.  Antibiotics given-   Antibiotics (From admission, onward)    Start     Stop Route Frequency Ordered    11/16/22 0900  cefepime in dextrose 5 % 1 gram/50 mL IVPB 1 g         -- IV Every 12 hours (non-standard times) 11/15/22 1950    11/16/22 0130  clindamycin in D5W 900 mg/50 mL IVPB 900 mg  (ED Adult Sepsis Treatment)         -- IV Every 8 hours (non-standard times) 11/15/22 1944    11/15/22 2100  metronidazole IVPB 500 mg         -- IV Every 8 hours (non-standard times) 11/15/22 1954    11/15/22 2053  vancomycin - pharmacy to dose  (vancomycin IVPB)        See [facility] for full Linked Orders Report.    -- IV pharmacy to manage frequency 11/15/22 1954    11/15/22 1730  vancomycin 2 g in dextrose 5 % 500 mL IVPB  (ED Adult Sepsis  "Treatment)         -- IV Once 11/15/22 1729        Cultures were taken-   Microbiology Results (last 7 days)     Procedure Component Value Units Date/Time    Blood culture x two cultures. Draw prior to antibiotics. [[id number]] Collected: 11/15/22 1742    Order Status: Sent Specimen: Blood from Peripheral, Forearm, Left Updated: 11/15/22 1742    Blood culture x two cultures. Draw prior to antibiotics. 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  Will hold all the above. Expect improvement with holding meds and treatment of DONNA      Type 2 diabetes mellitus with hyperglycemia, with long-term current use of insulin  BG- 255    A1c pending  Levemir BID  Moderate dose SSI  BG Q6    Essential hypertension  Hypotensive currently    Hold antihypertensives for now        VTE Risk Mitigation (From admission, onward)         Ordered     IP VTE HIGH RISK PATIENT  Once         11/15/22 1948     Place sequential compression device  Until discontinued         11/15/22 1948     Reason for No Pharmacological VTE Prophylaxis  Once        Question:  Reasons:  Answer:  Risk of Bleeding    11/15/22 1948     Place sequential compression device  Until discontinued         11/15/22 " 1936                   Roger Davis NP  Department of Hospital Medicine   [facility] - Intensive Care ([place])

## 2022-11-16 NOTE — CONSULTS
[facility] Intensive Care [facility])  Wound Care    Patient Name:  Nithin Wagner   MRN:  [id number]  Date: 11/16/2022  Diagnosis: Sepsis    History:     Past Medical History:   Diagnosis Date    Arthritis     Atrial myxoma     CHF (congestive heart failure)     Coronary atherosclerosis     Diabetes mellitus, type 2     Difficult intubation     Hepatitis B     Hyperlipidemia     Hypertension     Non-alcoholic fatty liver disease     Rheumatoid arthritis     Rheumatoid arthritis flare 07/12/2021    Stroke     TIA    Systolic heart failure        Social History     Socioeconomic History    Marital status: Single   Occupational History    Occupation: , respiratory therapist, [employer]     Comment: Retired   Tobacco Use    Smoking status: Some Days     Packs/day: 1.00     Years: 35.00     Pack years: 35.00     Types: Cigarettes    Smokeless tobacco: Never   Substance and Sexual Activity    Alcohol use: Not Currently    Drug use: No    Sexual activity: Never       Precautions:     Allergies as of 11/15/2022 - Reviewed 11/15/2022   Allergen Reaction Noted    Enbrel [etanercept] Shortness Of Breath 07/12/2021    Nsaids (non-steroidal anti-inflammatory drug) Other (See Comments) 06/29/2015    Statins-hmg-coa reductase inhibitors Other (See Comments) 06/29/2015    Pcn [penicillins] Rash 06/29/2015       [facility] Assessment Details/Treatment     Wound care consult received for assessment of sacrum. Dr. Celaya with urology asked wound care to assess scrotum as well s/p excision and debridement of necrotic scrotal tissue Patient is a 67 year old male with medical history of DM2, CAD, HTN, HLD admitted for scrotal abscess with possible kami's.     Upon assessment to sacrum noted intact skin with blanchable redness. Sacral mepilex currently in use. Patient with scabbed ulcerations to right elbow and lower extremities. Nursing to continue with pressure injury prevention interventions.     Upon assessment  noted swollen scrotum with dark purple discoloration to wound edges and small amount of necrotic tissue. Rest of tissue is pink granulation. Urology with plans for additional washout and debridement tomorrow in the OR.     Cleansed wound bed with Vashe and reapplied Vashe moistened kerlix to affected area. Covered with ABD pads and elevated scrotum. Recommend BID Vashe kerlix packing to scrotum to promote moist wound healing and reduce bio-burden. Nursing and MD team notified. Orders placed. Wound care to continue to assist with pt prn.       11/16/22 1044        Incision/Site 11/15/22 2229 Scrotum   Date First Assessed/Time First Assessed: 11/15/22 2229   Location: Scrotum   Wound Image           Incision WDL ex   Dressing Appearance Intact;Moist drainage;Saturated   Drainage Amount Large   Drainage Characteristics/Odor Serous;Yellow;No odor   Appearance Pink;Yellow;Purple;Red;Moist   Wound Edges Open   Wound Depth (cm) 4 cm   Care Cleansed with:;Antimicrobial agent   Dressing Changed;Gauze, wet to moist;Absorptive Pad   Packing packed with  (Vashe moistned kerlix)     11/16/2022

## 2022-11-16 NOTE — TRANSFER OF CARE
Anesthesia Transfer of Care Note    Patient: Nithin Wagner    Procedure(s) Performed: Procedure(s) (LRB):  INCISION AND DRAINAGE, SCROTUM (N/A)    Patient location: ICU    Anesthesia Type: general    Transport from OR: Transported from OR on 6-10 L/min O2 by face mask with adequate spontaneous ventilation    Post pain: adequate analgesia    Post assessment: tolerated procedure well and no apparent anesthetic complications    Post vital signs: stable    Level of consciousness: awake and alert    Nausea/Vomiting: no nausea/vomiting    Complications: none    Transfer of care protocol was followed      Last vitals:   Visit Vitals  BP (!) 107/58   Pulse (!) 115   Temp 37.1 °C (98.7 °F) (Axillary)   Resp (!) 23   Ht 6' (1.829 m)   Wt 109.8 kg (242 lb)   SpO2 96%   BMI 32.82 kg/m²

## 2022-11-16 NOTE — EICU
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 Pt on broad antibiotics now.  /63   Pulse 101   Temp 98.4 °F (36.9 °C) (Axillary)   Resp (!) 21   Ht 5' 11" (1.803 m)   Wt 114.1 kg (251 lb 8.7 oz)   SpO2 (!) 93%   BMI 35.08 kg/m²   Intake/Output - Last 3 Shifts         11/14 0700  11/15 0659 11/15 0700  11/16 0659    I.V. (mL/kg)  119.6 (1)    IV Piggyback  1752.1    Total Intake(mL/kg)  1871.7 (16.4)    Urine (mL/kg/hr)  900    Total Output  900    Net  +971.7              Cr was 2.5 down to 1.5  K now 5 was higher  LA down to 2.2 was 4.4  WBC 21.85    Resting on video review    Plan :  Postop scrotal gangrene débridement  - Check K ; DONNA better  -Change fluid to NS as K on higher side   On broad abx - follow for signs of sepsis  Prior lobectomy- ? Cause for CO2 retention   SCD  D/w RN    "

## 2022-11-16 NOTE — PLAN OF CARE
Initial Discharge Planning Assessment:  Patient admitted on: 11/15/2022     Chart reviewed, Care plan discussed with treatment team,  attending Dr. Obrien     PCP updated in Epic: Dr. Drew  [facility]     DME at home: Oxygen      Current dispo: Home      Transportation: Family will provide transportation home     Power of  or Living Will: None     Anticipated DC needs from CM perspective:  None    11/16/22 1422   Discharge Assessment   Assessment Type Discharge Planning Assessment   Confirmed/corrected address, phone number and insurance Yes   Confirmed Demographics Correct on Facesheet   Source of Information patient   Communicated ALEXANDER with patient/caregiver Date not available/Unable to determine   Lives With alone   Do you expect to return to your current living situation? Yes   Do you have help at home or someone to help you manage your care at home? No   Prior to hospitilization cognitive status: Alert/Oriented   Current cognitive status: Alert/Oriented   Walking or Climbing Stairs Difficulty none   Dressing/Bathing Difficulty none   Equipment Currently Used at Home oxygen   Readmission within 30 days? No   Do you currently have service(s) that help you manage your care at home? No   Do you take prescription medications? Yes   Do you have prescription coverage? Yes   Do you have any problems affording any of your prescribed medications? No   Is the patient taking medications as prescribed? yes   How do you get to doctors appointments? car, drives self   Are you on dialysis? No   Do you take coumadin? No   Discharge Plan A Home   DME Needed Upon Discharge  none   Discharge Plan discussed with: Patient   Discharge Barriers Identified None   Physical Activity   On average, how many days per week do you engage in moderate to strenuous exercise (like a brisk walk)? 0 days   On average, how many minutes do you engage in exercise at this level? 0 min   Financial Resource Strain   How hard  is it for you to pay for the very basics like food, housing, medical care, and heating? Hard   Housing Stability   In the last 12 months, was there a time when you were not able to pay the mortgage or rent on time? Y   In the last 12 months, was there a time when you did not have a steady place to sleep or slept in a shelter (including now)? N   Transportation Needs   In the past 12 months, has lack of transportation kept you from medical appointments or from getting medications? yes   In the past 12 months, has lack of transportation kept you from meetings, work, or from getting things needed for daily living? Yes   Food Insecurity   Within the past 12 months, you worried that your food would run out before you got the money to buy more. Never true   Within the past 12 months, the food you bought just didn't last and you didn't have money to get more. Never true   Stress   Do you feel stress - tense, restless, nervous, or anxious, or unable to sleep at night because your mind is troubled all the time - these days? To some exte   Social Connections   In a typical week, how many times do you talk on the phone with family, friends, or neighbors? More than 3   How often do you get together with friends or relatives? More than 3   How often do you attend Quaker or Methodist services? Never   Do you belong to any clubs or organizations such as Quaker groups, unions, fraternal or athletic groups, or school groups? No   How often do you attend meetings of the clubs or organizations you belong to? Never   Are you , , , , never , or living with a partner? Never marrie   Alcohol Use   Q1: How often do you have a drink containing alcohol? Never   Q2: How many drinks containing alcohol do you have on a typical day when you are drinking? None   Q3: How often do you have six or more drinks on one occasion? Never   [facility] - Intensive Care ([place])  Initial Discharge Assessment       Primary  Care Provider: Tushar Drew MD    Admission Diagnosis: Sepsis [A41.9]    Admission Date: 11/15/2022  Expected Discharge Date:     Discharge Barriers Identified: (P) None    Payor: HUMANA MANAGED MEDICARE / Plan: HUMANA MEDICARE GENERIC / Product Type: Capitation /     Extended Emergency Contact Information  Primary Emergency Contact: Tushar Sanches   [facility]  Home Phone: [phone]  Relation: Other    Discharge Plan A: (P) Home         [id number] [place] - [address]  [address]  [facility] [address]  Phone: [phone] Fax: [phone]    [facility] - [place], [place] - [address]  [address] [facility] [facility] [address]  Phone: [phone] Fax: [phone]    [id number] [place], [place] - [address]  [address]  [facility] [address]  Phone: [phone] Fax: [phone]      Initial Assessment (most recent)       Adult Discharge Assessment - 11/16/22 1422          Discharge Assessment    Assessment Type Discharge Planning Assessment (P)      Confirmed/corrected address, phone number and insurance Yes (P)      Confirmed Demographics Correct on Facesheet (P)      Source of Information patient (P)      Communicated ALEXANDRE with patient/caregiver Date not available/Unable to determine (P)      Lives With alone (P)      Do you expect to return to your current living situation? Yes (P)      Do you have help at home or someone to help you manage your care at home? No (P)      Prior to hospitilization cognitive status: Alert/Oriented (P)      Current cognitive status: Alert/Oriented (P)      Walking or Climbing Stairs Difficulty none (P)      Dressing/Bathing Difficulty none (P)      Equipment Currently Used at Home oxygen (P)      Readmission within 30 days? No (P)      Do you currently have service(s) that help you manage your care at home? No (P)      Do you take prescription medications? Yes (P)      Do you  have prescription coverage? Yes (P)      Do you have any problems affording any of your prescribed medications? No (P)      Is the patient taking medications as prescribed? yes (P)      How do you get to doctors appointments? car, drives self (P)      Are you on dialysis? No (P)      Do you take coumadin? No (P)      Discharge Plan A Home (P)      DME Needed Upon Discharge  none (P)      Discharge Plan discussed with: Patient (P)      Discharge Barriers Identified None (P)         Physical Activity    On average, how many days per week do you engage in moderate to strenuous exercise (like a brisk walk)? 0 days (P)      On average, how many minutes do you engage in exercise at this level? 0 min (P)         Financial Resource Strain    How hard is it for you to pay for the very basics like food, housing, medical care, and heating? Hard (P)         Housing Stability    In the last 12 months, was there a time when you were not able to pay the mortgage or rent on time? Yes (P)      In the last 12 months, was there a time when you did not have a steady place to sleep or slept in a shelter (including now)? No (P)         Transportation Needs    In the past 12 months, has lack of transportation kept you from medical appointments or from getting medications? Yes (P)      In the past 12 months, has lack of transportation kept you from meetings, work, or from getting things needed for daily living? Yes (P)         Food Insecurity    Within the past 12 months, you worried that your food would run out before you got the money to buy more. Never true (P)      Within the past 12 months, the food you bought just didn't last and you didn't have money to get more. Never true (P)         Stress    Do you feel stress - tense, restless, nervous, or anxious, or unable to sleep at night because your mind is troubled all the time - these days? To some extent (P)         Social Connections    In a typical week, how many times do you talk on  the phone with family, friends, or neighbors? More than three times a week (P)      How often do you get together with friends or relatives? More than three times a week (P)      How often do you attend Mormon or Amish services? Never (P)      Do you belong to any clubs or organizations such as Mormon groups, unions, fraternal or athletic groups, or school groups? No (P)      How often do you attend meetings of the clubs or organizations you belong to? Never (P)      Are you , , , , never , or living with a partner? Never  (P)         Alcohol Use    Q1: How often do you have a drink containing alcohol? Never (P)      Q2: How many drinks containing alcohol do you have on a typical day when you are drinking? Patient does not drink (P)      Q3: How often do you have six or more drinks on one occasion? Never (P)

## 2022-11-16 NOTE — ANESTHESIA PROCEDURE NOTES
Intubation    Date/Time: 11/15/2022 8:54 PM  Performed by: Ismael Gay CRNA  Authorized by: Zack Schwartz MD     Intubation:     Induction:  Intravenous    Intubated:  Postinduction    Mask Ventilation:  Moderately difficult with oral airway    Attempts:  2    Attempted By:  CRNA    Method of Intubation:  Video laryngoscopy    Blade:  Michaud 3 (bougie used)    Laryngeal View Grade: Grade III - only epiglottis visible      Attempted By (2nd Attempt):  CRNA    Intubation method: LMA.    Difficult Airway Encountered?: Yes      Complications:  None    Airway Device:  Supraglottic airway/LMA    Airway Device Size:  5.0    Placement Verified By:  Capnometry    Complicating Factors:  None    Findings Post-Intubation:  BS equal bilateral and atraumatic/condition of teeth unchanged

## 2022-11-16 NOTE — SUBJECTIVE & OBJECTIVE
Interval History:  No acute events, patient reports pain to the groin site were overall controlled with medications, and only exacerbated by movement and wound care.    Review of Systems   Constitutional:  Negative for chills and fever.   Respiratory:  Negative for shortness of breath.    Cardiovascular:  Negative for chest pain.   Genitourinary:  Positive for scrotal swelling and testicular pain.   Objective:     Vital Signs (Most Recent):  Temp: 98.1 °F (36.7 °C) (11/16/22 1501)  Pulse: 94 (11/16/22 1601)  Resp: 20 (11/16/22 1601)  BP: 115/66 (11/16/22 1601)  SpO2: 95 % (11/16/22 1601) Vital Signs (24h Range):  Temp:  [98.1 °F (36.7 °C)-99.6 °F (37.6 °C)] 98.1 °F (36.7 °C)  Pulse:  [] 94  Resp:  [13-40] 20  SpO2:  [92 %-99 %] 95 %  BP: ()/(50-76) 115/66     Weight: 114.1 kg (251 lb 8.7 oz)  Body mass index is 35.08 kg/m².    Intake/Output Summary (Last 24 hours) at 11/16/2022 1731  Last data filed at 11/16/2022 1505  Gross per 24 hour   Intake 2221.1 ml   Output 2700 ml   Net -478.9 ml      Physical Exam  Constitutional:       General: He is sleeping.      Appearance: He is well-developed. He is obese.   HENT:      Head: Normocephalic and atraumatic.      Nose: Nose normal.   Eyes:      Extraocular Movements: Extraocular movements intact.      Conjunctiva/sclera: Conjunctivae normal.   Cardiovascular:      Rate and Rhythm: Normal rate and regular rhythm.      Pulses:           Radial pulses are 2+ on the right side and 2+ on the left side.      Heart sounds: Normal heart sounds.   Pulmonary:      Effort: Pulmonary effort is normal.      Breath sounds: Decreased breath sounds present.      Comments: Diminished breath sounds throughout  Abdominal:      General: Bowel sounds are normal. There is no distension.      Palpations: Abdomen is soft.      Tenderness: There is no abdominal tenderness. There is no guarding or rebound.   Musculoskeletal:         General: Normal range of motion.      Cervical back:  Normal range of motion and neck supple.   Skin:     General: Skin is warm and dry.      Comments: Packing noted to left scrotum   Neurological:      Mental Status: He is oriented to person, place, and time.      Motor: Motor function is intact.   Psychiatric:         Mood and Affect: Mood normal.         Behavior: Behavior normal.         Thought Content: Thought content normal.       Significant Labs: All pertinent labs within the past 24 hours have been reviewed.    Significant Imaging: I have reviewed all pertinent imaging results/findings within the past 24 hours.

## 2022-11-16 NOTE — CONSULTS
[facility] - Emergency Dept  Urology  Consult Note    Patient Name: Nithin Wagner  MRN: [id number]  Admission Date: 11/15/2022  Hospital Length of Stay: 0   Code Status: No Order   Attending Provider: Ivone Carrasco MD   Consulting Provider: William Hatch MD  Primary Care Physician: Tushar Drew MD  Principal Problem:<principal problem not specified>    Consults    Subjective:     HPI:  67M h/o DM2, CAD, HTN, HLD presents with scrotal pain and swelling. Started about 4 weeks ago and has been progressively worsening despite a couple courses of antibiotics (unsure which ones). He reports fevers and chills at home. He also reports nausea, decreased PO intake, multiple syncopal episodes, and stool incontinence. He is on continuous blood glucose monitoring and states blood sugars have been in 190s today. He was seen yesterday in urgent care and diagnosed with scrotal abscess with possible kami's; he referred to the ER but waited until today to come in.    He was hypotensive and tachycardic on arrival in the ED. Sepsis protocol was started. Labs and imaging are currently pending.         Past Medical History:   Diagnosis Date    Arthritis     Atrial myxoma     Coronary atherosclerosis     Diabetes mellitus, type 2     Difficult intubation     Hepatitis B     Hyperlipidemia     Hypertension     Non-alcoholic fatty liver disease     Rheumatoid arthritis     Rheumatoid arthritis flare 07/12/2021    Stroke     TIA    Systolic heart failure        Past Surgical History:   Procedure Laterality Date    CORONARY ANGIOGRAPHY N/A 3/10/2021    Procedure: ANGIOGRAM, CORONARY ARTERY - right radial;  Surgeon: Shemar Dempsey MD;  [facility] CATH LAB;  Service: Cardiology;  Laterality: N/A;    CORONARY STENT PLACEMENT  03/10/2021    prox-mid RCA [place] 4.5 x 26 mm, 4.5 x 12 mm    LUNG LOBECTOMY Right 2008    RUL lobectomy after removal of atrial myxoma    PLEURA BIOPSY      RESECTION OF ATRIAL MYXOMA  2007        Review of patient's allergies indicates:   Allergen Reactions    Enbrel [etanercept] Shortness Of Breath     CHF    Nsaids (non-steroidal anti-inflammatory drug) Other (See Comments)     hypertention  Other reaction(s): Other (See Comments)  hypertention  HTN    Statins-hmg-coa reductase inhibitors Other (See Comments)     Heart arrythemias  Other reaction(s): Other (See Comments)  Heart arrythemias  Joint pain and cardiac arrythmias    Pcn [penicillins] Rash       Family History       Problem Relation (Age of Onset)    Cancer Sister    Diabetes type I Father    Diabetes type II Mother    Hodgkin's lymphoma Mother    Kidney failure Father            Tobacco Use    Smoking status: Some Days     Packs/day: 1.00     Years: 35.00     Pack years: 35.00     Types: Cigarettes    Smokeless tobacco: Never   Substance and Sexual Activity    Alcohol use: Not Currently    Drug use: No    Sexual activity: Never       Review of Systems   Constitutional:  Positive for chills and fever.   HENT: Negative.     Eyes: Negative.    Gastrointestinal:  Positive for diarrhea, nausea and vomiting.   Genitourinary:  Positive for difficulty urinating and scrotal swelling.   Neurological:  Positive for syncope.   Psychiatric/Behavioral: Negative.       Objective:     Temp:  [97.7 °F (36.5 °C)-99.9 °F (37.7 °C)] 99.9 °F (37.7 °C)  Pulse:  [] 102  Resp:  [14-33] 27  SpO2:  [92 %-93 %] 93 %  BP: ()/(46-80) 84/51     Body mass index is 32.82 kg/m².           Drains       None                   Physical Exam  Constitutional:       General: He is not in acute distress.     Appearance: He is well-developed.   HENT:      Head: Normocephalic and atraumatic.   Eyes:      General:         Right eye: No discharge.         Left eye: No discharge.      Conjunctiva/sclera: Conjunctivae normal.      Pupils: Pupils are equal, round, and reactive to light.   Neck:      Thyroid: No thyromegaly.      Trachea: No tracheal deviation.    Cardiovascular:      Rate and Rhythm: Regular rhythm.      Chest Wall: PMI is not displaced.   Pulmonary:      Effort: Pulmonary effort is normal. No accessory muscle usage or respiratory distress.   Chest:      Chest wall: No mass, tenderness or crepitus.   Abdominal:      General: There is no distension.      Palpations: Abdomen is soft.   Genitourinary:     Comments: Diffuse scrotal edema and mild eythema without crepitus or any areas of fluctuance noted.  Musculoskeletal:      Cervical back: Normal range of motion and neck supple.   Skin:     General: Skin is warm and dry.      Findings: No erythema or rash.   Neurological:      Cranial Nerves: No cranial nerve deficit.      Sensory: No sensory deficit.   Psychiatric:         Mood and Affect: Mood is not anxious or depressed.         Speech: Speech normal.         Behavior: Behavior normal.         Thought Content: Thought content normal.         Judgment: Judgment normal.       Significant Labs:    BMP:  Recent Labs   Lab 11/15/22  1740   *   K 6.1*   CL 94*   CO2 23   BUN 44*   CREATININE 2.5*   CALCIUM 9.3       CBC:  Recent Labs   Lab 11/15/22  1740   WBC 22.42*   HGB 16.9   HCT 49.1        Lactic Acid: 4.4    Significant Imaging:    CT A/P: pending                    Assessment and Plan:     Scrotal edema  -- No evidence of abscess or Mary's gangrene on physical exam  -- CT scan currently pending - will review once complete and update plan accordingly        VTE Risk Mitigation (From admission, onward)    None          Thank you for your consult. I will follow-up with patient. Please contact us if you have any additional questions.    William Hatch MD  Urology  [facility] - Emergency Dept

## 2022-11-16 NOTE — ASSESSMENT & PLAN NOTE
-- No evidence of abscess or Mary's gangrene on physical exam  -- CT scan currently pending - will review once complete and update plan accordingly

## 2022-11-16 NOTE — ASSESSMENT & PLAN NOTE
Creatinine 2.5, baseline- 1.3; likely due to hypotension/sepsis    IVF resuscitation  Treat sepsis  CMP in AM  Avoid nephrotoxics

## 2022-11-16 NOTE — PROGRESS NOTES
[facility] Intensive Care [facility]  Urology  Progress Note    Patient Name: Nithin Wagner  MRN: [id number]  Admission Date: 11/15/2022  Hospital Length of Stay: 1 days  Code Status: Full Code   Attending Provider: Darby Obrien MD   Primary Care Physician: Tushar Drew MD    Subjective:     HPI:  67M h/o DM2, CAD, HTN, HLD presents with scrotal pain and swelling. Started about 4 weeks ago and has been progressively worsening despite a couple courses of antibiotics (unsure which ones). He reports fevers and chills at home. He also reports nausea, decreased PO intake, multiple syncopal episodes, and stool incontinence. He is on continuous blood glucose monitoring and states blood sugars have been in 190s today. He was seen yesterday in urgent care and diagnosed with scrotal abscess with possible kami's; he referred to the ER but waited until today to come in.    He was hypotensive and tachycardic on arrival in the ED. Sepsis protocol was started. Labs and imaging are currently pending.         Interval History: NAEO. AFVSS. Mild tachycardia since surgery. Cr improved, WBC stable. Pain controlled. Dressings changed overnight due to soaking through.    Review of Systems  Objective:     Temp:  [98.4 °F (36.9 °C)-99.9 °F (37.7 °C)] 98.7 °F (37.1 °C)  Pulse:  [] 96  Resp:  [16-33] 20  SpO2:  [92 %-98 %] 95 %  BP: ()/(46-76) 119/60     Body mass index is 35.08 kg/m².           Drains       Drain  Duration                  Urethral Catheter 11/15/22 2145 Non-latex 16 Fr. <1 day                    Physical Exam  Vitals and nursing note reviewed.   Constitutional:       General: He is not in acute distress.     Appearance: Normal appearance.   HENT:      Head: Atraumatic.      Nose: Nose normal.   Eyes:      Extraocular Movements: Extraocular movements intact.      Pupils: Pupils are equal, round, and reactive to light.   Cardiovascular:      Rate and Rhythm: Normal rate.   Pulmonary:      Effort: Pulmonary  effort is normal.   Abdominal:      General: Abdomen is flat.   Genitourinary:     Comments: Open wound with kerlex and ABD pads. Approximately 40% of scrotum removed. Clean wound edges with healthy appearing tissue. No active bleeding source.  Musculoskeletal:         General: Normal range of motion.      Cervical back: Normal range of motion.   Neurological:      General: No focal deficit present.      Mental Status: He is alert and oriented to person, place, and time. Mental status is at baseline.   Psychiatric:         Mood and Affect: Mood normal.         Behavior: Behavior normal.       Significant Labs:    BMP:  Recent Labs   Lab 11/15/22  1740 11/15/22  2248 11/16/22  0549   * 131* 133*   K 6.1* 5.0 5.1   CL 94* 98 99   CO2 23 25 26   BUN 44* 37* 33*   CREATININE 2.5* 1.5* 1.2   CALCIUM 9.3 8.4* 8.4*       CBC:   Recent Labs   Lab 11/15/22  1740 11/15/22  2248 11/16/22  0549   WBC 22.42* 21.85* 24.13*   HGB 16.9 14.9 14.1   HCT 49.1 44.4 42.3    188 190       Urine Studies: No results for input(s): COLORU, APPEARANCEUA, PHUR, SPECGRAV, PROTEINUA, GLUCUA, KETONESU, BILIRUBINUA, OCCULTUA, NITRITE, UROBILINOGEN, LEUKOCYTESUR, RBCUA, WBCUA, BACTERIA, SQUAMEPITHEL, HYALINECASTS in the last 168 hours.    Invalid input(s): WRIGHTSUR    Significant Imaging:  CT: I have reviewed all results within the past 24 hours and my personal findings are:  As per HPI.  Air in left hemiscrotum prior to surgery.      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 S/p excision and debridement of scrotum 11/15/22.    - Admitted to ICU under hospital medicine for sepsis protocol  - Wound clean and intact. Dressing changed on am rounds.  - Continue broad spectrum antibiotics  - Follow up cultures  - Fluid resuscitation  - Dressing changes minimum BID with assessment of wounds  - No anticipated return to operating room today 11/16/22 based on morning assessment of wound.  Subject to change  - Rest of care per primary    Scrotal edema  -- No evidence of abscess or Mary's gangrene on physical exam  -- CT scan currently pending - will review once complete and update plan accordingly        VTE Risk Mitigation (From admission, onward)         Ordered     IP VTE HIGH RISK PATIENT  Once         11/15/22 1948     Place sequential compression device  Until discontinued         11/15/22 1948     Reason for No Pharmacological VTE Prophylaxis  Once        Question:  Reasons:  Answer:  Risk of Bleeding    11/15/22 1948     Place sequential compression device  Until discontinued         11/15/22 1936                Ryley Celaya MD  Urology  [facility] - Intensive [facility] ([place])

## 2022-11-16 NOTE — PROGRESS NOTES
CT scan reviewed with free air noted in deep tissues in left hemiscrotum. Discussed findings with ED and with patient. Recommend emergent surgery tonight for debridement of necrotic tissue. Explained that without intervention he risks death due to necrotizing infection. He expressed understanding and wishes to proceed as recommended.

## 2022-11-16 NOTE — PROGRESS NOTES
Pharmacokinetic Assessment Follow Up: IV Vancomycin    Vancomycin serum concentration assessment(s):    The random level was drawn correctly and can be used to guide therapy at this time. The measurement is below the desired definitive target range of 10 to 20 mcg/mL.    Vancomycin Regimen Plan:    Give one time 15 mg/kg dose (1750 mg) now. Re-dose when the random level is less than 20 mcg/mL, next level to be drawn at 0430 on 11/17/22    Drug levels (last 3 results):  Recent Labs   Lab Result Units 11/16/22  1639   Vancomycin, Random ug/mL 5.7       Pharmacy will continue to follow and monitor vancomycin.    Please contact pharmacy at extension [phone] for questions regarding this assessment.    Thank you for the consult,   Nita Arceo       Patient brief summary:  Nithin Wagner is a 67 y.o. male initiated on antimicrobial therapy with IV Vancomycin for treatment of skin & soft tissue infection    The patient's current regimen is pulse dosing due to DONNA    Drug Allergies:   Review of patient's allergies indicates:   Allergen Reactions    Enbrel [etanercept] Shortness Of Breath     CHF    Nsaids (non-steroidal anti-inflammatory drug) Other (See Comments)     hypertention  Other reaction(s): Other (See Comments)  hypertention  HTN    Statins-hmg-coa reductase inhibitors Other (See Comments)     Heart arrythemias  Other reaction(s): Other (See Comments)  Heart arrythemias  Joint pain and cardiac arrythmias    Pcn [penicillins] Rash       Actual Body Weight:   114.1 kg    Renal Function:   Estimated Creatinine Clearance: 74.6 mL/min (based on SCr of 1.2 mg/dL).,     Dialysis Method (if applicable):  N/A    CBC (last 72 hours):  Recent Labs   Lab Result Units 11/15/22  1740 11/15/22  2041 11/15/22  2248 11/16/22  0549   WBC K/uL 22.42*  --  21.85* 24.13*   Hemoglobin g/dL 16.9  --  14.9 14.1   Hemoglobin A1C %  --  7.6*  --   --    Hematocrit % 49.1  --  44.4 42.3   Platelets K/uL 230  --  188 190   Gran % % 76.0*  --   69.0 73.0   Lymph % % 8.0*  --  4.0* 6.0*   Mono % % 10.0  --  7.0 6.0   Eosinophil % % 0.0  --  0.0 0.0   Basophil % % 0.0  --  0.0 0.0   Differential Method  Manual  --  Manual Manual       Metabolic Panel (last 72 hours):  Recent Labs   Lab Result Units 11/15/22  1740 11/15/22  2248 11/16/22  0549   Sodium mmol/L 131* 131* 133*   Potassium mmol/L 6.1* 5.0 5.1   Chloride mmol/L 94* 98 99   CO2 mmol/L 23 25 26   Glucose mg/dL 255* 183* 194*   BUN mg/dL 44* 37* 33*   Creatinine mg/dL 2.5* 1.5* 1.2   Albumin g/dL 2.6* 2.1* 2.0*   Total Bilirubin mg/dL 1.1* 0.9 0.5   Alkaline Phosphatase U/L 95 86 89   AST U/L 77* 115* 82*   ALT U/L 54* 96* 87*   Magnesium mg/dL  --  2.0 2.2   Phosphorus mg/dL  --  4.6* 5.2*       Vancomycin Administrations:  vancomycin given in the last 96 hours                     vancomycin 2 g in dextrose 5 % 500 mL IVPB ()  Restarted 11/15/22 2029      Restarted  1952      Restarted  1914     2,000 mg New Bag  1830                    Microbiologic Results:  Microbiology Results (last 7 days)       Procedure Component Value Units Date/Time    Aerobic culture [[id number]] Collected: 11/15/22 2139    Order Status: Sent Specimen: Genital from Abscess Updated: 11/16/22 1113    Culture, Anaerobic [[id number]] Collected: 11/15/22 2155    Order Status: Sent Specimen: Abscess from Perineum Updated: 11/16/22 1113    Blood culture x two cultures. Draw prior to antibiotics. [[id number]] Collected: 11/15/22 1742    Order Status: Completed Specimen: Blood from Peripheral, Forearm, Left Updated: 11/16/22 0715     Blood Culture, Routine No Growth to date    Narrative:      Aerobic and anaerobic    Blood culture x two cultures. Draw prior to antibiotics. [[id number]] Collected: 11/15/22 1741    Order Status: Completed Specimen: Blood from Antecubital, Right Updated: 11/16/22 0715     Blood Culture, Routine No Growth to date    Narrative:      Aerobic and anaerobic    Influenza A & B by Molecular [[id number]]      Order Status: Canceled Specimen: Nasopharyngeal Swab

## 2022-11-17 ENCOUNTER — ANESTHESIA EVENT (OUTPATIENT)
Dept: SURGERY | Facility: OTHER | Age: 67
DRG: 854 | End: 2022-11-17
Payer: MEDICARE

## 2022-11-17 ENCOUNTER — TELEPHONE (OUTPATIENT)
Dept: INTERNAL MEDICINE | Facility: CLINIC | Age: 67
End: 2022-11-17
Payer: MEDICARE

## 2022-11-17 ENCOUNTER — ANESTHESIA (OUTPATIENT)
Dept: SURGERY | Facility: OTHER | Age: 67
DRG: 854 | End: 2022-11-17
Payer: MEDICARE

## 2022-11-17 LAB
ALBUMIN SERPL BCP-MCNC: 2 G/DL (ref 3.5–5.2)
ALP SERPL-CCNC: 97 U/L (ref 55–135)
ALT SERPL W/O P-5'-P-CCNC: 69 U/L (ref 10–44)
ANION GAP SERPL CALC-SCNC: 7 MMOL/L (ref 8–16)
ANISOCYTOSIS BLD QL SMEAR: SLIGHT
AST SERPL-CCNC: 40 U/L (ref 10–40)
BASOPHILS # BLD AUTO: 0.04 K/UL (ref 0–0.2)
BASOPHILS NFR BLD: 0.2 % (ref 0–1.9)
BILIRUB SERPL-MCNC: 0.3 MG/DL (ref 0.1–1)
BUN SERPL-MCNC: 31 MG/DL (ref 8–23)
CALCIUM SERPL-MCNC: 7.7 MG/DL (ref 8.7–10.5)
CHLORIDE SERPL-SCNC: 104 MMOL/L (ref 95–110)
CO2 SERPL-SCNC: 26 MMOL/L (ref 23–29)
CREAT SERPL-MCNC: 0.9 MG/DL (ref 0.5–1.4)
DIFFERENTIAL METHOD: ABNORMAL
EOSINOPHIL # BLD AUTO: 0.1 K/UL (ref 0–0.5)
EOSINOPHIL NFR BLD: 0.3 % (ref 0–8)
ERYTHROCYTE [DISTWIDTH] IN BLOOD BY AUTOMATED COUNT: 13.2 % (ref 11.5–14.5)
EST. GFR  (NO RACE VARIABLE): >60 ML/MIN/1.73 M^2
GIANT PLATELETS BLD QL SMEAR: PRESENT
GLUCOSE SERPL-MCNC: 102 MG/DL (ref 70–110)
HCT VFR BLD AUTO: 42 % (ref 40–54)
HGB BLD-MCNC: 13.8 G/DL (ref 14–18)
IMM GRANULOCYTES # BLD AUTO: 0.21 K/UL (ref 0–0.04)
IMM GRANULOCYTES NFR BLD AUTO: 1.2 % (ref 0–0.5)
LYMPHOCYTES # BLD AUTO: 1 K/UL (ref 1–4.8)
LYMPHOCYTES NFR BLD: 5.6 % (ref 18–48)
MAGNESIUM SERPL-MCNC: 2.7 MG/DL (ref 1.6–2.6)
MCH RBC QN AUTO: 33.7 PG (ref 27–31)
MCHC RBC AUTO-ENTMCNC: 32.9 G/DL (ref 32–36)
MCV RBC AUTO: 103 FL (ref 82–98)
MONOCYTES # BLD AUTO: 0.9 K/UL (ref 0.3–1)
MONOCYTES NFR BLD: 5.3 % (ref 4–15)
NEUTROPHILS # BLD AUTO: 15 K/UL (ref 1.8–7.7)
NEUTROPHILS NFR BLD: 87.4 % (ref 38–73)
NRBC BLD-RTO: 0 /100 WBC
PHOSPHATE SERPL-MCNC: 3.3 MG/DL (ref 2.7–4.5)
PLATELET # BLD AUTO: 170 K/UL (ref 150–450)
PLATELET BLD QL SMEAR: ABNORMAL
PMV BLD AUTO: 11.1 FL (ref 9.2–12.9)
POCT GLUCOSE: 72 MG/DL (ref 70–110)
POCT GLUCOSE: 79 MG/DL (ref 70–110)
POTASSIUM SERPL-SCNC: 4.5 MMOL/L (ref 3.5–5.1)
PROT SERPL-MCNC: 4.6 G/DL (ref 6–8.4)
RBC # BLD AUTO: 4.09 M/UL (ref 4.6–6.2)
SMUDGE CELLS BLD QL SMEAR: PRESENT
SODIUM SERPL-SCNC: 137 MMOL/L (ref 136–145)
TOXIC GRANULES BLD QL SMEAR: PRESENT
VANCOMYCIN SERPL-MCNC: 12 UG/ML
WBC # BLD AUTO: 17.2 K/UL (ref 3.9–12.7)

## 2022-11-17 PROCEDURE — 37000008 HC ANESTHESIA 1ST 15 MINUTES: Performed by: UROLOGY

## 2022-11-17 PROCEDURE — 63600175 PHARM REV CODE 636 W HCPCS: Performed by: UROLOGY

## 2022-11-17 PROCEDURE — 37000009 HC ANESTHESIA EA ADD 15 MINS: Performed by: UROLOGY

## 2022-11-17 PROCEDURE — 83735 ASSAY OF MAGNESIUM: CPT | Performed by: NURSE PRACTITIONER

## 2022-11-17 PROCEDURE — 84100 ASSAY OF PHOSPHORUS: CPT | Performed by: NURSE PRACTITIONER

## 2022-11-17 PROCEDURE — S0030 INJECTION, METRONIDAZOLE: HCPCS | Performed by: NURSE PRACTITIONER

## 2022-11-17 PROCEDURE — 63600175 PHARM REV CODE 636 W HCPCS: Performed by: INTERNAL MEDICINE

## 2022-11-17 PROCEDURE — 25000003 PHARM REV CODE 250: Performed by: UROLOGY

## 2022-11-17 PROCEDURE — 11004 PR DEBRIDE NECROTIC SKIN/ TISSUE, GENIT & PERINM: ICD-10-PCS | Mod: ,,, | Performed by: UROLOGY

## 2022-11-17 PROCEDURE — 99233 PR SUBSEQUENT HOSPITAL CARE,LEVL III: ICD-10-PCS | Mod: ,,, | Performed by: INTERNAL MEDICINE

## 2022-11-17 PROCEDURE — 80053 COMPREHEN METABOLIC PANEL: CPT | Performed by: NURSE PRACTITIONER

## 2022-11-17 PROCEDURE — 99233 SBSQ HOSP IP/OBS HIGH 50: CPT | Mod: ,,, | Performed by: INTERNAL MEDICINE

## 2022-11-17 PROCEDURE — 25000003 PHARM REV CODE 250: Performed by: INTERNAL MEDICINE

## 2022-11-17 PROCEDURE — 27000221 HC OXYGEN, UP TO 24 HOURS

## 2022-11-17 PROCEDURE — 63600175 PHARM REV CODE 636 W HCPCS: Performed by: NURSE ANESTHETIST, CERTIFIED REGISTERED

## 2022-11-17 PROCEDURE — 94761 N-INVAS EAR/PLS OXIMETRY MLT: CPT

## 2022-11-17 PROCEDURE — 25000242 PHARM REV CODE 250 ALT 637 W/ HCPCS: Performed by: NURSE PRACTITIONER

## 2022-11-17 PROCEDURE — 25000003 PHARM REV CODE 250: Performed by: HOSPITALIST

## 2022-11-17 PROCEDURE — 85025 COMPLETE CBC W/AUTO DIFF WBC: CPT | Performed by: NURSE PRACTITIONER

## 2022-11-17 PROCEDURE — S0030 INJECTION, METRONIDAZOLE: HCPCS | Performed by: UROLOGY

## 2022-11-17 PROCEDURE — 63600175 PHARM REV CODE 636 W HCPCS: Performed by: NURSE PRACTITIONER

## 2022-11-17 PROCEDURE — 99233 SBSQ HOSP IP/OBS HIGH 50: CPT | Mod: GC,,, | Performed by: UROLOGY

## 2022-11-17 PROCEDURE — 25000003 PHARM REV CODE 250: Performed by: NURSE PRACTITIONER

## 2022-11-17 PROCEDURE — 36000705 HC OR TIME LEV I EA ADD 15 MIN: Performed by: UROLOGY

## 2022-11-17 PROCEDURE — 25000003 PHARM REV CODE 250: Performed by: NURSE ANESTHETIST, CERTIFIED REGISTERED

## 2022-11-17 PROCEDURE — 20000000 HC ICU ROOM

## 2022-11-17 PROCEDURE — 94640 AIRWAY INHALATION TREATMENT: CPT

## 2022-11-17 PROCEDURE — 25000003 PHARM REV CODE 250: Performed by: ANESTHESIOLOGY

## 2022-11-17 PROCEDURE — 36000704 HC OR TIME LEV I 1ST 15 MIN: Performed by: UROLOGY

## 2022-11-17 PROCEDURE — 99900035 HC TECH TIME PER 15 MIN (STAT)

## 2022-11-17 PROCEDURE — 99233 PR SUBSEQUENT HOSPITAL CARE,LEVL III: ICD-10-PCS | Mod: GC,,, | Performed by: UROLOGY

## 2022-11-17 PROCEDURE — 80202 ASSAY OF VANCOMYCIN: CPT | Performed by: HOSPITALIST

## 2022-11-17 PROCEDURE — 11004 DBRDMT SKIN XTRNL GENT&PER: CPT | Mod: ,,, | Performed by: UROLOGY

## 2022-11-17 RX ORDER — ROCURONIUM BROMIDE 10 MG/ML
INJECTION, SOLUTION INTRAVENOUS
Status: DISCONTINUED | OUTPATIENT
Start: 2022-11-17 | End: 2022-11-17

## 2022-11-17 RX ORDER — MEPERIDINE HYDROCHLORIDE 25 MG/ML
12.5 INJECTION INTRAMUSCULAR; INTRAVENOUS; SUBCUTANEOUS ONCE AS NEEDED
Status: DISCONTINUED | OUTPATIENT
Start: 2022-11-17 | End: 2022-11-17

## 2022-11-17 RX ORDER — PROCHLORPERAZINE EDISYLATE 5 MG/ML
5 INJECTION INTRAMUSCULAR; INTRAVENOUS EVERY 30 MIN PRN
Status: DISCONTINUED | OUTPATIENT
Start: 2022-11-17 | End: 2022-11-18

## 2022-11-17 RX ORDER — PHENYLEPHRINE HYDROCHLORIDE 10 MG/ML
INJECTION INTRAVENOUS
Status: DISCONTINUED | OUTPATIENT
Start: 2022-11-17 | End: 2022-11-17

## 2022-11-17 RX ORDER — SODIUM CHLORIDE 0.9 % (FLUSH) 0.9 %
3 SYRINGE (ML) INJECTION
Status: DISCONTINUED | OUTPATIENT
Start: 2022-11-17 | End: 2022-11-18

## 2022-11-17 RX ORDER — MEPERIDINE HYDROCHLORIDE 25 MG/ML
12.5 INJECTION INTRAMUSCULAR; INTRAVENOUS; SUBCUTANEOUS ONCE AS NEEDED
Status: DISCONTINUED | OUTPATIENT
Start: 2022-11-17 | End: 2022-11-18

## 2022-11-17 RX ORDER — LIDOCAINE HCL/PF 100 MG/5ML
SYRINGE (ML) INTRAVENOUS
Status: DISCONTINUED | OUTPATIENT
Start: 2022-11-17 | End: 2022-11-17

## 2022-11-17 RX ORDER — VASOPRESSIN 20 [USP'U]/ML
INJECTION, SOLUTION INTRAMUSCULAR; SUBCUTANEOUS
Status: DISCONTINUED | OUTPATIENT
Start: 2022-11-17 | End: 2022-11-17

## 2022-11-17 RX ORDER — DIPHENHYDRAMINE HYDROCHLORIDE 50 MG/ML
25 INJECTION INTRAMUSCULAR; INTRAVENOUS EVERY 6 HOURS PRN
Status: DISCONTINUED | OUTPATIENT
Start: 2022-11-17 | End: 2022-11-18

## 2022-11-17 RX ORDER — HYDROMORPHONE HYDROCHLORIDE 2 MG/ML
0.4 INJECTION, SOLUTION INTRAMUSCULAR; INTRAVENOUS; SUBCUTANEOUS EVERY 5 MIN PRN
Status: DISCONTINUED | OUTPATIENT
Start: 2022-11-17 | End: 2022-11-18

## 2022-11-17 RX ORDER — OXYCODONE AND ACETAMINOPHEN 5; 325 MG/1; MG/1
1 TABLET ORAL EVERY 4 HOURS PRN
Status: DISCONTINUED | OUTPATIENT
Start: 2022-11-17 | End: 2022-12-01 | Stop reason: HOSPADM

## 2022-11-17 RX ORDER — OXYCODONE HYDROCHLORIDE 5 MG/1
5 TABLET ORAL
Status: DISCONTINUED | OUTPATIENT
Start: 2022-11-17 | End: 2022-11-18

## 2022-11-17 RX ORDER — SUCCINYLCHOLINE CHLORIDE 20 MG/ML
INJECTION INTRAMUSCULAR; INTRAVENOUS
Status: DISCONTINUED | OUTPATIENT
Start: 2022-11-17 | End: 2022-11-17

## 2022-11-17 RX ORDER — MORPHINE SULFATE 2 MG/ML
2 INJECTION, SOLUTION INTRAMUSCULAR; INTRAVENOUS ONCE
Status: COMPLETED | OUTPATIENT
Start: 2022-11-17 | End: 2022-11-17

## 2022-11-17 RX ORDER — HYDROMORPHONE HYDROCHLORIDE 2 MG/ML
0.4 INJECTION, SOLUTION INTRAMUSCULAR; INTRAVENOUS; SUBCUTANEOUS EVERY 5 MIN PRN
Status: DISCONTINUED | OUTPATIENT
Start: 2022-11-17 | End: 2022-11-17

## 2022-11-17 RX ORDER — ONDANSETRON 2 MG/ML
INJECTION INTRAMUSCULAR; INTRAVENOUS
Status: DISCONTINUED | OUTPATIENT
Start: 2022-11-17 | End: 2022-11-17

## 2022-11-17 RX ORDER — PROPOFOL 10 MG/ML
VIAL (ML) INTRAVENOUS
Status: DISCONTINUED | OUTPATIENT
Start: 2022-11-17 | End: 2022-11-17

## 2022-11-17 RX ORDER — ONDANSETRON 2 MG/ML
4 INJECTION INTRAMUSCULAR; INTRAVENOUS DAILY PRN
Status: DISCONTINUED | OUTPATIENT
Start: 2022-11-17 | End: 2022-11-18

## 2022-11-17 RX ORDER — FENTANYL CITRATE 50 UG/ML
INJECTION, SOLUTION INTRAMUSCULAR; INTRAVENOUS
Status: DISCONTINUED | OUTPATIENT
Start: 2022-11-17 | End: 2022-11-17

## 2022-11-17 RX ORDER — SODIUM CHLORIDE, SODIUM LACTATE, POTASSIUM CHLORIDE, CALCIUM CHLORIDE 600; 310; 30; 20 MG/100ML; MG/100ML; MG/100ML; MG/100ML
INJECTION, SOLUTION INTRAVENOUS CONTINUOUS PRN
Status: DISCONTINUED | OUTPATIENT
Start: 2022-11-17 | End: 2022-11-17

## 2022-11-17 RX ADMIN — MORPHINE SULFATE 4 MG: 4 INJECTION, SOLUTION INTRAMUSCULAR; INTRAVENOUS at 06:11

## 2022-11-17 RX ADMIN — PROPOFOL 200 MG: 10 INJECTION, EMULSION INTRAVENOUS at 04:11

## 2022-11-17 RX ADMIN — MORPHINE SULFATE 4 MG: 4 INJECTION, SOLUTION INTRAMUSCULAR; INTRAVENOUS at 10:11

## 2022-11-17 RX ADMIN — ONDANSETRON 4 MG: 2 INJECTION INTRAMUSCULAR; INTRAVENOUS at 04:11

## 2022-11-17 RX ADMIN — LIDOCAINE HYDROCHLORIDE 100 MG: 20 INJECTION, SOLUTION INTRAVENOUS at 04:11

## 2022-11-17 RX ADMIN — METRONIDAZOLE 500 MG: 500 INJECTION, SOLUTION INTRAVENOUS at 09:11

## 2022-11-17 RX ADMIN — FENTANYL CITRATE 100 MCG: 0.05 INJECTION, SOLUTION INTRAMUSCULAR; INTRAVENOUS at 04:11

## 2022-11-17 RX ADMIN — EZETIMIBE 10 MG: 10 TABLET ORAL at 09:11

## 2022-11-17 RX ADMIN — INSULIN DETEMIR 25 UNITS: 100 INJECTION, SOLUTION SUBCUTANEOUS at 08:11

## 2022-11-17 RX ADMIN — IPRATROPIUM BROMIDE AND ALBUTEROL SULFATE 3 ML: 2.5; .5 SOLUTION RESPIRATORY (INHALATION) at 08:11

## 2022-11-17 RX ADMIN — SODIUM CHLORIDE, SODIUM LACTATE, POTASSIUM CHLORIDE, AND CALCIUM CHLORIDE: 600; 310; 30; 20 INJECTION, SOLUTION INTRAVENOUS at 04:11

## 2022-11-17 RX ADMIN — FOLIC ACID 1 MG: 1 TABLET ORAL at 09:11

## 2022-11-17 RX ADMIN — MUPIROCIN: 20 OINTMENT TOPICAL at 09:11

## 2022-11-17 RX ADMIN — METRONIDAZOLE 500 MG: 500 INJECTION, SOLUTION INTRAVENOUS at 04:11

## 2022-11-17 RX ADMIN — MORPHINE SULFATE 4 MG: 4 INJECTION, SOLUTION INTRAMUSCULAR; INTRAVENOUS at 12:11

## 2022-11-17 RX ADMIN — THERA TABS 1 TABLET: TAB at 09:11

## 2022-11-17 RX ADMIN — OXYCODONE HYDROCHLORIDE AND ACETAMINOPHEN 1 TABLET: 5; 325 TABLET ORAL at 11:11

## 2022-11-17 RX ADMIN — OXYCODONE 5 MG: 5 TABLET ORAL at 07:11

## 2022-11-17 RX ADMIN — CLINDAMYCIN PHOSPHATE 900 MG: 900 INJECTION, SOLUTION INTRAVENOUS at 09:11

## 2022-11-17 RX ADMIN — CEFEPIME HYDROCHLORIDE 1 G: 1 INJECTION, SOLUTION INTRAVENOUS at 08:11

## 2022-11-17 RX ADMIN — ASPIRIN 81 MG CHEWABLE TABLET 81 MG: 81 TABLET CHEWABLE at 09:11

## 2022-11-17 RX ADMIN — CEFEPIME HYDROCHLORIDE 1 G: 1 INJECTION, SOLUTION INTRAVENOUS at 10:11

## 2022-11-17 RX ADMIN — VANCOMYCIN HYDROCHLORIDE 1750 MG: 500 INJECTION, POWDER, LYOPHILIZED, FOR SOLUTION INTRAVENOUS at 09:11

## 2022-11-17 RX ADMIN — DIGOXIN 125 MCG: 125 TABLET ORAL at 09:11

## 2022-11-17 RX ADMIN — VASOPRESSIN 1 UNITS: 20 INJECTION INTRAVENOUS at 04:11

## 2022-11-17 RX ADMIN — SUGAMMADEX 200 MG: 100 INJECTION, SOLUTION INTRAVENOUS at 04:11

## 2022-11-17 RX ADMIN — ROCURONIUM BROMIDE 20 MG: 10 INJECTION, SOLUTION INTRAVENOUS at 04:11

## 2022-11-17 RX ADMIN — OXYCODONE HYDROCHLORIDE AND ACETAMINOPHEN 1 TABLET: 5; 325 TABLET ORAL at 02:11

## 2022-11-17 RX ADMIN — PROPOFOL 50 MG: 10 INJECTION, EMULSION INTRAVENOUS at 04:11

## 2022-11-17 RX ADMIN — METRONIDAZOLE 500 MG: 500 INJECTION, SOLUTION INTRAVENOUS at 02:11

## 2022-11-17 RX ADMIN — PHENYLEPHRINE HYDROCHLORIDE 100 MCG: 10 INJECTION INTRAVENOUS at 04:11

## 2022-11-17 RX ADMIN — PHENYLEPHRINE HYDROCHLORIDE 200 MCG: 10 INJECTION INTRAVENOUS at 04:11

## 2022-11-17 RX ADMIN — MORPHINE SULFATE 2 MG: 2 INJECTION, SOLUTION INTRAMUSCULAR; INTRAVENOUS at 07:11

## 2022-11-17 RX ADMIN — SUCCINYLCHOLINE CHLORIDE 120 MG: 20 INJECTION, SOLUTION INTRAMUSCULAR; INTRAVENOUS at 04:11

## 2022-11-17 RX ADMIN — CLINDAMYCIN PHOSPHATE 900 MG: 900 INJECTION, SOLUTION INTRAVENOUS at 04:11

## 2022-11-17 RX ADMIN — CLINDAMYCIN PHOSPHATE 900 MG: 900 INJECTION, SOLUTION INTRAVENOUS at 01:11

## 2022-11-17 RX ADMIN — MUPIROCIN: 20 OINTMENT TOPICAL at 08:11

## 2022-11-17 RX ADMIN — VANCOMYCIN HYDROCHLORIDE 1750 MG: 500 INJECTION, POWDER, LYOPHILIZED, FOR SOLUTION INTRAVENOUS at 12:11

## 2022-11-17 RX ADMIN — DEXTROSE 125 ML: 10 SOLUTION INTRAVENOUS at 09:11

## 2022-11-17 NOTE — TELEPHONE ENCOUNTER
Juanita faxed over a plan of care packet post-inpatient medical facility admission. Transition of Care, would like PCP's input and sent back over

## 2022-11-17 NOTE — ANESTHESIA POSTPROCEDURE EVALUATION
Anesthesia Post Evaluation    Patient: Nithin Wagner    Procedure(s) Performed: Procedure(s) (LRB):  INCISION AND DRAINAGE, SCROTUM (N/A)    Final Anesthesia Type: general      Patient location during evaluation: ICU  Patient participation: Yes- Able to Participate  Level of consciousness: awake and alert  Post-procedure vital signs: reviewed and stable  Pain management: adequate  Airway patency: patent    PONV status at discharge: No PONV  Anesthetic complications: no      Cardiovascular status: blood pressure returned to baseline  Respiratory status: unassisted, spontaneous ventilation and face mask  Hydration status: euvolemic  Follow-up not needed.          Vitals Value Taken Time   /71 11/17/22 1722   Temp 98.1 11/17/22 1724   Pulse 76 11/17/22 1723   Resp 19 11/17/22 1723   SpO2 94 % 11/17/22 1723   Vitals shown include unvalidated device data.      No case tracking events are documented in the log.      Pain/Renee Score: Pain Rating Prior to Med Admin: 8 (11/17/2022  2:21 PM)  Pain Rating Post Med Admin: 0 (11/17/2022  3:21 PM)

## 2022-11-17 NOTE — PROGRESS NOTES
[facility] Intensive Care [facility]  Urology  Progress Note    Patient Name: Nithin Wagner  MRN: [id number]  Admission Date: 11/15/2022  Hospital Length of Stay: 2 days  Code Status: Full Code   Attending Provider: CARISA Lozano MD   Primary Care Physician: Tushar Drew MD    Subjective:     HPI:  67M h/o DM2, CAD, HTN, HLD presents with scrotal pain and swelling. Started about 4 weeks ago and has been progressively worsening despite a couple courses of antibiotics (unsure which ones). He reports fevers and chills at home. He also reports nausea, decreased PO intake, multiple syncopal episodes, and stool incontinence. He is on continuous blood glucose monitoring and states blood sugars have been in 190s today. He was seen yesterday in urgent care and diagnosed with scrotal abscess with possible kami's; he referred to the ER but waited until today to come in.    He was hypotensive and tachycardic on arrival in the ED. Sepsis protocol was started. Labs and imaging are currently pending.         Interval History: NAEO. AFVSS. Pain well controlled. Tolerated diet yesterday, currently npo. Slight progression of necrotic area of wound. Cultures pending.    Review of Systems  Objective:     Temp:  [98 °F (36.7 °C)-98.6 °F (37 °C)] 98 °F (36.7 °C)  Pulse:  [] 81  Resp:  [11-40] 22  SpO2:  [89 %-99 %] 95 %  BP: ()/(57-83) 112/75     Body mass index is 35.08 kg/m².           Drains       Drain  Duration                  Urethral Catheter 11/15/22 2145 Non-latex 16 Fr. 1 day                    Physical Exam  Vitals and nursing note reviewed.   Constitutional:       General: He is not in acute distress.     Appearance: Normal appearance.   HENT:      Head: Atraumatic.      Nose: Nose normal.   Eyes:      Extraocular Movements: Extraocular movements intact.      Pupils: Pupils are equal, round, and reactive to light.   Cardiovascular:      Rate and Rhythm: Normal rate.   Pulmonary:      Effort: Pulmonary  effort is normal.   Abdominal:      General: Abdomen is flat. There is no distension.      Tenderness: There is no abdominal tenderness. There is no right CVA tenderness or left CVA tenderness.   Genitourinary:     Comments: Open wound with kerlex and ABD pads. Approximately 40% of scrotum removed. Progression of necrotic tissue at apex/inferomedial border. Deep lateral portion of wound with some dusky appearing tissue. No active bleeding source.  Musculoskeletal:         General: Normal range of motion.      Cervical back: Normal range of motion.   Neurological:      General: No focal deficit present.      Mental Status: He is alert and oriented to person, place, and time. Mental status is at baseline.   Psychiatric:         Mood and Affect: Mood normal.         Behavior: Behavior normal.       Significant Labs:    BMP:  Recent Labs   Lab 11/15/22  2248 11/16/22  0549 11/17/22 0357   * 133* 137   K 5.0 5.1 4.5   CL 98 99 104   CO2 25 26 26   BUN 37* 33* 31*   CREATININE 1.5* 1.2 0.9   CALCIUM 8.4* 8.4* 7.7*       CBC:   Recent Labs   Lab 11/15/22  2248 11/16/22  0549 11/17/22  0357   WBC 21.85* 24.13* 17.20*   HGB 14.9 14.1 13.8*   HCT 44.4 42.3 42.0    190 170       Urine Studies: No results for input(s): COLORU, APPEARANCEUA, PHUR, SPECGRAV, PROTEINUA, GLUCUA, KETONESU, BILIRUBINUA, OCCULTUA, NITRITE, UROBILINOGEN, LEUKOCYTESUR, RBCUA, WBCUA, BACTERIA, SQUAMEPITHEL, HYALINECASTS in the last 168 hours.    Invalid input(s): WRIGHTR    Significant Imaging:  All pertinent imaging results/findings from the past 24 hours have been reviewed.        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 S/p excision and debridement of scrotum 11/15/22.    - Admitted to ICU under hospital medicine for sepsis protocol  - Wound with mild progression of wound. Dressing changed on am rounds.  - NPO for likely second debridement in OR    - Continue broad spectrum  antibiotics  - Follow up cultures  - Fluid resuscitation  - Dressing changes minimum BID with assessment of wounds  - No anticipated return to operating room today 11/16/22 based on morning assessment of wound. Subject to change  - Rest of care per primary    Dispo: likely return to OR today pending discussion with OR board    Scrotal edema  -- No evidence of abscess or Mary's gangrene on physical exam  -- CT scan currently pending - will review once complete and update plan accordingly        VTE Risk Mitigation (From admission, onward)         Ordered     IP VTE HIGH RISK PATIENT  Once         11/15/22 1948     Place sequential compression device  Until discontinued         11/15/22 1948     Reason for No Pharmacological VTE Prophylaxis  Once        Question:  Reasons:  Answer:  Risk of Bleeding    11/15/22 1948     Place sequential compression device  Until discontinued         11/15/22 1936                Ryley Celaya MD  Urology  [facility] - Intensive Care ([place])

## 2022-11-17 NOTE — ASSESSMENT & PLAN NOTE
- Hypotensive was on admit that responded to treatment with IV fluids with no need for pressor support  - Blood pressure in low normal range, 90s to 100s systolic  - Continue to hold antihypertensives for now

## 2022-11-17 NOTE — TRANSFER OF CARE
"Anesthesia Transfer of Care Note    Patient: Nithin Wagner    Procedure(s) Performed: Procedure(s) (LRB):  INCISION AND DRAINAGE, SCROTUM (N/A)    Patient location: ICU    Anesthesia Type: general    Transport from OR: Transported from OR on 6-10 L/min O2 by face mask with adequate spontaneous ventilation. Continuous ECG monitoring in transport. Continuous SpO2 monitoring in transport    Post pain: adequate analgesia    Post assessment: no apparent anesthetic complications and tolerated procedure well    Post vital signs: stable    Level of consciousness: alert and awake    Nausea/Vomiting: no nausea/vomiting    Complications: none    Transfer of care protocol was followed      Last vitals:   Visit Vitals  /70   Pulse 80   Temp 36.7 °C (98.1 °F) (Oral)   Resp (!) 23   Ht 5' 11" (1.803 m)   Wt 114.1 kg (251 lb 8.7 oz)   SpO2 97%   BMI 35.08 kg/m²     "

## 2022-11-17 NOTE — PLAN OF CARE
Problem: Adult Inpatient Plan of Care  Goal: Plan of Care Review  Outcome: Ongoing, Progressing  Flowsheets (Taken 11/17/2022 0201)  Plan of Care Reviewed With: patient  Goal: Absence of Hospital-Acquired Illness or Injury  Intervention: Identify and Manage Fall Risk  Flowsheets (Taken 11/17/2022 0201)  Safety Promotion/Fall Prevention:   assistive device/personal item within reach   bed alarm set   medications reviewed   nonskid shoes/socks when out of bed   pulse ox   side rails raised x 3   instructed to call staff for mobility  Intervention: Prevent Skin Injury  Flowsheets (Taken 11/17/2022 0201)  Body Position: position changed independently     Problem: Impaired Wound Healing  Goal: Optimal Wound Healing  Outcome: Ongoing, Progressing  Intervention: Promote Wound Healing  Flowsheets (Taken 11/17/2022 0201)  Sleep/Rest Enhancement: awakenings minimized  Activity Management: Up in chair - L3  Pain Management Interventions:   awakened for pain meds per patient request   pain management plan reviewed with patient/caregiver     Problem: Glycemic Control Impaired (Sepsis/Septic Shock)  Goal: Blood Glucose Level Within Desired Range  Outcome: Ongoing, Progressing  Intervention: Optimize Glycemic Control  Flowsheets (Taken 11/17/2022 0201)  Glycemic Management:   blood glucose monitored   supplemental insulin given     Problem: Infection Progression (Sepsis/Septic Shock)  Goal: Absence of Infection Signs and Symptoms  Outcome: Ongoing, Progressing     Problem: Fall Injury Risk  Goal: Absence of Fall and Fall-Related Injury  Outcome: Ongoing, Progressing  Intervention: Identify and Manage Contributors  Flowsheets (Taken 11/17/2022 0201)  Self-Care Promotion:   independence encouraged   BADL personal objects within reach  Medication Review/Management: medications reviewed  Intervention: Promote Injury-Free Environment  Flowsheets (Taken 11/17/2022 0201)  Safety Promotion/Fall Prevention:   assistive device/personal  item within reach   bed alarm set   medications reviewed   nonskid shoes/socks when out of bed   pulse ox   side rails raised x 3   instructed to call staff for mobility

## 2022-11-17 NOTE — PLAN OF CARE
Plan of care reviewed with pt. Pt voiced understanding. NSR on monitor. No acute distress noted. Pt to be NPO at midnight per urology MD orders. Side rails X2, bed in lowest position, call bell within reach, pt advised to call for assistance. Maintain bed alarm for pt safety.

## 2022-11-17 NOTE — ASSESSMENT & PLAN NOTE
CAD  - Last echo with normal EF and grade 1 diastolic dysfunction on 10/28/2022  - No overt signs of volume overload and no reported chest pain  - Elevated troponin due to sepsis with associated hypotension along with DONNA and CHF which were trended down and no ectopy noted on telemetry, and no acute EKG changes  - Restart digoxin 125 mcg p.o. daily, ASA 81 mg p.o. daily and ezetimibe 10 mg p.o. daily  - Continue to hold Lasix and hydrochlorothiazide due to sepsis, will resume medications when clinically indicated

## 2022-11-17 NOTE — ASSESSMENT & PLAN NOTE
- Potassium was 6.1 on admission with new DONNA  - Patient takes KCL supplementation daily along with aldactone  - Holding Aldactone and KCl supplementation  - Potassium level has normalized to 5.1 today  - Continue to monitor

## 2022-11-17 NOTE — ASSESSMENT & PLAN NOTE
Mary's Gangrene of Scrotum  Leukocytosis  Lactic acidosis  Elevated troponin  - Patient criteria for sepsis with infective focus of Mary gangrene of the scrotum  - Lactic acid level 4.4, procalcitonin >48, WBC 24, and elevated troponin levels that trended down  - Urology consulted and patient underwent excision and debridement of necrotic scrotal tissue on 11/15  - Continue broad-spectrum antibiotic regimen with cefepime 1 g IV q.12, clindamycin 900 mg IV Q 8, metronidazole 500 mg IV Q 8 and vancomycin dosed by pharmacy  - Elevated troponins likely secondary to sepsis and setting of DONNA given no reported chest pain and no acute EKG changes  - Blood cultures no growth to date and wound cultures pending  - Wound care ordered and Urology following  - Clinically improving with resolution of lactic acidosis and definitive debridement  - Blood pressure still on low side of normal but stable, no need for pressor support  - Patient may need further debridement which will be determined by Urology  - Continue monitoring ICU for now, possible step down tomorrow if patient remains stable and after discussion with Urology

## 2022-11-17 NOTE — ASSESSMENT & PLAN NOTE
- Creatinine 2.5, baseline- 1.3; likely due to hypotension/sepsis  - Improved with IV fluids and treatment of sepsis  - Creatinine down to 1.2 today  - Continue monitor with repeat labs and follow I/O's   Verbalized Understanding

## 2022-11-17 NOTE — PROGRESS NOTES
[facility] Intensive Care [facility] Medicine  Progress Note    Patient Name: Nithin Wagner  MRN: [id number]  Patient Class: IP- Inpatient   Admission Date: 11/15/2022  Length of Stay: 1 days  Attending Physician: Darby Obrien MD  Primary Care Provider: Tushar Drew MD        Subjective:     Principal Problem:Sepsis        HPI:  The patient is a 67M with a past medical history of DM2, CAD, HTN, and HLD who presents with scrotal pain and swelling. Started about 4 weeks ago and has been progressively worsening despite a couple courses of antibiotics (unsure which ones). He reports fevers and chills at home, nausea, decreased PO intake, and multiple syncopal episodes with +incontinence of stool. Unable to urinate for the past 24 hours or so. Sugars 190s at home. He was seen yesterday in urgent care and referred to the ER, but says he wasn't able to make it at that time so he comes today instead. On initial workup, the patient has CT evidence of Fourniers gangrene.  He will be brought to the operating room emergently and admitted after to ICU for further management.      Overview/Hospital Course:  Mr. Wagner presented with scrotal pain and swelling.  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  Empiric initiated with cefepime, clindamycin, metronidazole, and vancomycin.  Urology consulted and he underwent emergent excision and debridement of necrotic tissue on 11/15.      Interval History:  No acute events, patient reports pain to the groin site were overall controlled with medications, and only exacerbated by movement and wound care.    Review of Systems   Constitutional:  Negative for chills and fever.   Respiratory:  Negative for shortness of breath.    Cardiovascular:  Negative for chest pain.   Genitourinary:  Positive for scrotal swelling and testicular pain.   Objective:     Vital Signs (Most Recent):  Temp: 98.1 °F (36.7 °C) (11/16/22 1501)  Pulse: 94 (11/16/22 1601)  Resp: 20 (11/16/22  1601)  BP: 115/66 (11/16/22 1601)  SpO2: 95 % (11/16/22 1601) Vital Signs (24h Range):  Temp:  [98.1 °F (36.7 °C)-99.6 °F (37.6 °C)] 98.1 °F (36.7 °C)  Pulse:  [] 94  Resp:  [13-40] 20  SpO2:  [92 %-99 %] 95 %  BP: ()/(50-76) 115/66     Weight: 114.1 kg (251 lb 8.7 oz)  Body mass index is 35.08 kg/m².    Intake/Output Summary (Last 24 hours) at 11/16/2022 1731  Last data filed at 11/16/2022 1505  Gross per 24 hour   Intake 2221.1 ml   Output 2700 ml   Net -478.9 ml      Physical Exam  Constitutional:       General: He is sleeping.      Appearance: He is well-developed. He is obese.   HENT:      Head: Normocephalic and atraumatic.      Nose: Nose normal.   Eyes:      Extraocular Movements: Extraocular movements intact.      Conjunctiva/sclera: Conjunctivae normal.   Cardiovascular:      Rate and Rhythm: Normal rate and regular rhythm.      Pulses:           Radial pulses are 2+ on the right side and 2+ on the left side.      Heart sounds: Normal heart sounds.   Pulmonary:      Effort: Pulmonary effort is normal.      Breath sounds: Decreased breath sounds present.      Comments: Diminished breath sounds throughout  Abdominal:      General: Bowel sounds are normal. There is no distension.      Palpations: Abdomen is soft.      Tenderness: There is no abdominal tenderness. There is no guarding or rebound.   Musculoskeletal:         General: Normal range of motion.      Cervical back: Normal range of motion and neck supple.   Skin:     General: Skin is warm and dry.      Comments: Packing noted to left scrotum   Neurological:      Mental Status: He is oriented to person, place, and time.      Motor: Motor function is intact.   Psychiatric:         Mood and Affect: Mood normal.         Behavior: Behavior normal.         Thought Content: Thought content normal.       Significant Labs: All pertinent labs within the past 24 hours have been reviewed.    Significant Imaging: I have reviewed all pertinent imaging  results/findings within the past 24 hours.      Assessment/Plan:      * Sepsis  Mary's Gangrene of Scrotum  Leukocytosis  Lactic acidosis  Elevated troponin  - Patient criteria for sepsis with infective focus of Mary gangrene of the scrotum  - Lactic acid level 4.4, procalcitonin >48, WBC 24, and elevated troponin levels that trended down  - Urology consulted and patient underwent excision and debridement of necrotic scrotal tissue on 11/15  - Continue broad-spectrum antibiotic regimen with cefepime 1 g IV q.12, clindamycin 900 mg IV Q 8, metronidazole 500 mg IV Q 8 and vancomycin dosed by pharmacy  - Elevated troponins likely secondary to sepsis and setting of DONNA given no reported chest pain and no acute EKG changes  - Blood cultures no growth to date and wound cultures pending  - Wound care ordered and Urology following  - Clinically improving with resolution of lactic acidosis and definitive debridement  - Blood pressure still on low side of normal but stable, no need for pressor support  - Patient may need further debridement which will be determined by Urology  - Continue monitoring ICU for now, possible step down tomorrow if patient remains stable and after discussion with Urology    DONNA (acute kidney injury)  - Creatinine 2.5, baseline- 1.3; likely due to hypotension/sepsis  - Improved with IV fluids and treatment of sepsis  - Creatinine down to 1.2 today  - Continue monitor with repeat labs and follow I/O's    Chronic diastolic congestive heart failure  CAD  - Last echo with normal EF and grade 1 diastolic dysfunction on 10/28/2022  - No overt signs of volume overload and no reported chest pain  - Elevated troponin due to sepsis with associated hypotension along with DONNA and CHF which were trended down and no ectopy noted on telemetry, and no acute EKG changes  - Restart digoxin 125 mcg p.o. daily, ASA 81 mg p.o. daily and ezetimibe 10 mg p.o. 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 and moderate scale sliding scale insulin  - Will consider adding prandial NovoLog after monitoring glucose levels on this regimen  - Will adjust insulin regimen as needed to achieve glucose between 140 - 180    Essential hypertension  - Hypotensive was on admit that responded to treatment with IV fluids with no need for pressor support  - Blood pressure in low normal range, 90s to 100s systolic  - Continue to hold antihypertensives for now      VTE Risk Mitigation (From admission, onward)         Ordered     IP VTE HIGH RISK PATIENT  Once         11/15/22 1948     Place sequential compression device  Until discontinued         11/15/22 1948     Reason for No Pharmacological VTE Prophylaxis  Once        Question:  Reasons:  Answer:  Risk of Bleeding    11/15/22 1948     Place sequential compression device  Until discontinued         11/15/22 1936              Critical care time spent on the evaluation and treatment of severe organ dysfunction, review of pertinent labs and imaging studies, discussions with consulting providers and discussions with patient/family: 45 minutes.        Darby Obrien MD  Department of Hospital Medicine   [facility] - Intensive Care ([facility]  "

## 2022-11-17 NOTE — ASSESSMENT & PLAN NOTE
68 yo male who presented with Mary's. S/p excision and debridement of scrotum 11/15/22.    - Admitted to ICU under [facility] medicine for sepsis protocol  - Wound with mild progression of wound. Dressing changed on am rounds.  - NPO for likely second debridement in OR    - Continue broad spectrum antibiotics  - Follow up cultures  - Fluid resuscitation  - Dressing changes minimum BID with assessment of wounds  - No anticipated return to operating room today 11/16/22 based on morning assessment of wound. Subject to change  - Rest of care per primary    Dispo: likely return to OR today pending discussion with OR board

## 2022-11-17 NOTE — NURSING
0929 Discussed plan of care with Dr. Lozano this am. Patient given 12.5g of D10 per PRN order for BG of 70 on patient's monitor which was also calibrated this am. 16 units of long acting will be given per MD order. BG now 107.    1505     1730 BG 96. Back from OR. VSS. Incision intact, small amount of serosanguinous drainage present.     1820 Patient's sister Gricelda updated on patient condition and procedure per pt request.

## 2022-11-17 NOTE — ANESTHESIA PROCEDURE NOTES
Intubation    Date/Time: 11/17/2022 4:16 PM  Performed by: Elana Rouse CRNA  Authorized by: Chilo Urban MD     Intubation:     Induction:  Intravenous    Intubated:  Postinduction    Mask Ventilation:  Moderately difficult with oral airway (2 person BMV)    Attempts:  1    Attempted By:  CRNA    Method of Intubation:  Video laryngoscopy    Blade:  Michaud 4    Laryngeal View Grade: Grade IIA - cords partially seen      Difficult Airway Encountered?: No      Complications:  None    Airway Device:  Oral endotracheal tube    Airway Device Size:  7.0    Style/Cuff Inflation:  Cuffed (inflated to minimal occlusive pressure)    Inflation Amount (mL):  5    Tube secured:  23    Secured at:  The lips    Placement Verified By:  Capnometry    Complicating Factors:  Poor neck/head extension, oropharyngeal edema or fat and large/floppy epiglottis    Findings Post-Intubation:  Atraumatic/condition of teeth unchanged and BS equal bilateral  Notes:      Shoulder roll

## 2022-11-17 NOTE — PROGRESS NOTES
[facility] Intensive Care [facility] Medicine  Progress Note    Patient Name: Nithin Wagner  MRN: [id number]  Patient Class: IP- Inpatient   Admission Date: 11/15/2022  Length of Stay: 2 days  Attending Physician: CARISA Lozano MD  Primary Care Provider: Tushar Drew MD        Subjective:     Principal Problem:Sepsis        HPI:  The patient is a 67M with a past medical history of DM2, CAD, HTN, and HLD who presents with scrotal pain and swelling. Started about 4 weeks ago and has been progressively worsening despite a couple courses of antibiotics (unsure which ones). He reports fevers and chills at home, nausea, decreased PO intake, and multiple syncopal episodes with +incontinence of stool. Unable to urinate for the past 24 hours or so. Sugars 190s at home. He was seen yesterday in urgent care and referred to the ER, but says he wasn't able to make it at that time so he comes today instead. On initial workup, the patient has CT evidence of Fourniers gangrene.  He will be brought to the operating room emergently and admitted after to ICU for further management.      Overview/Hospital Course:  Mr. Wagner presented with scrotal pain and swelling.  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  Empiric initiated with cefepime, clindamycin, metronidazole, and vancomycin.  Urology consulted and he underwent emergent excision and debridement of necrotic tissue on 11/15.      Interval History: No acute events overnight. Reports scrotal pain with dressing change. WBCs trending down. No other concerns at this time.    Review of Systems   Constitutional:  Negative for chills and fever.   Respiratory:  Negative for cough and shortness of breath.    Cardiovascular:  Negative for chest pain and palpitations.   Gastrointestinal:  Negative for abdominal pain, nausea and vomiting.   Genitourinary:  Positive for scrotal swelling.   Objective:     Vital Signs (Most Recent):  Temp: 97.5 °F (36.4 °C) (11/17/22  1915)  Pulse: 97 (11/17/22 1945)  Resp: (!) 22 (11/17/22 1945)  BP: (!) 109/56 (11/17/22 1945)  SpO2: 96 % (11/17/22 1945)   Vital Signs (24h Range):  Temp:  [97.5 °F (36.4 °C)-98.4 °F (36.9 °C)] 97.5 °F (36.4 °C)  Pulse:  [] 97  Resp:  [11-43] 22  SpO2:  [89 %-98 %] 96 %  BP: ()/(56-83) 109/56     Weight: 114.1 kg (251 lb 8.7 oz)  Body mass index is 35.08 kg/m².    Intake/Output Summary (Last 24 hours) at 11/17/2022 2002  Last data filed at 11/17/2022 1830  Gross per 24 hour   Intake 3902.63 ml   Output 2000 ml   Net 1902.63 ml      Physical Exam  Vitals and nursing note reviewed.   Constitutional:       General: He is not in acute distress.     Appearance: He is well-developed. He is obese.   HENT:      Head: Normocephalic and atraumatic.   Eyes:      General:         Right eye: No discharge.         Left eye: No discharge.      Conjunctiva/sclera: Conjunctivae normal.   Cardiovascular:      Rate and Rhythm: Normal rate.      Pulses: Normal pulses.   Pulmonary:      Effort: Pulmonary effort is normal. No respiratory distress.   Abdominal:      Palpations: Abdomen is soft.      Tenderness: There is no abdominal tenderness.   Musculoskeletal:         General: Normal range of motion.      Right lower leg: No edema.      Left lower leg: No edema.   Skin:     General: Skin is warm and dry.   Neurological:      Mental Status: He is alert and oriented to person, place, and time.     Significant Labs:   CBC:  Recent Labs   Lab 11/15/22  2248 11/16/22  0549 11/17/22  0357   WBC 21.85* 24.13* 17.20*   HGB 14.9 14.1 13.8*   HCT 44.4 42.3 42.0    190 170   GRAN 69.0 73.0 87.4*  15.0*   LYMPH 4.0*  CANCELED 6.0*  CANCELED 5.6*  1.0   MONO 7.0  CANCELED 6.0  CANCELED 5.3  0.9   EOS CANCELED CANCELED 0.1   BASO CANCELED CANCELED 0.04   CMP:  Recent Labs   Lab 11/15/22  2248 11/16/22  0549 11/17/22  0357   * 133* 137   K 5.0 5.1 4.5   CL 98 99 104   CO2 25 26 26   BUN 37* 33* 31*   CREATININE 1.5*  1.2 0.9   * 194* 102   CALCIUM 8.4* 8.4* 7.7*   MG 2.0 2.2 2.7*   PHOS 4.6* 5.2* 3.3   ALKPHOS 86 89 97   * 82* 40   ALT 96* 87* 69*   BILITOT 0.9 0.5 0.3   PROT 5.3* 5.3* 4.6*   ALBUMIN 2.1* 2.0* 2.0*   ANIONGAP 8 8 7*      Significant Imaging:   No new imaging this morning.      Assessment/Plan:      * Sepsis  Mary's Gangrene of Scrotum  Leukocytosis  Lactic acidosis  Elevated troponin  - Patient criteria for sepsis with infective focus of Mary gangrene of the scrotum  - Lactic acid level 4.4, procalcitonin >48, WBC 24, and elevated troponin levels that trended down  - Urology consulted and patient underwent excision and debridement of necrotic scrotal tissue on 11/15; repeat debridement 11/17.  - Continue cefepime 1g IV q.12, clindamycin 900 mg IV Q 8, metronidazole 500 mg IV Q 8 and vancomycin dosed by pharmacy  - Elevated troponins likely secondary to sepsis and setting of DONNA given no reported chest pain and no acute EKG changes  - Blood cultures no growth to date and wound cultures pending  - Wound care ordered and Urology following  - Clinically improving with resolution of lactic acidosis and definitive debridement  - Appreciate urology assistance.    Chronic diastolic heart failure  CAD  - Last echo with normal EF and grade 1 diastolic dysfunction on 10/28/2022  - No overt signs of volume overload and no reported chest pain  - Elevated troponin due to sepsis with associated hypotension along with DONNA and CHF which were trended down and no ectopy noted on telemetry, and no acute EKG changes  - Restart digoxin 125 mcg p.o. daily, ASA 81 mg p.o. daily and ezetimibe 10 mg p.o. daily  - Continue to hold Lasix and hydrochlorothiazide due to sepsis, will resume medications when clinically indicated    DONNA (acute kidney injury)  - Creatinine 2.5, baseline- 1.3; likely due to hypotension/sepsis  - Improved with IV fluids and treatment of sepsis  - Creatinine down to 1.2 today  - Continue  "monitor with repeat labs and follow I/O's    Hyperkalemia  - Potassium was 6.1 on admission with new DONNA  - Patient takes KCL supplementation daily along with aldactone  - Holding Aldactone and KCl supplementation  - Potassium level has normalized to 5.1 today  - Continue to monitor    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  - Will adjust insulin regimen as needed to achieve glucose between 140 - 180    Essential hypertension  - Hypotensive was on admit that responded to treatment with IV fluids with no need for pressor support  - Blood pressure in low normal range, 90s to 100s systolic  - Continue to hold antihypertensives for now    VTE Risk Mitigation (From admission, onward)         Ordered     IP VTE HIGH RISK PATIENT  Once         11/15/22 1948     Place sequential compression device  Until discontinued         11/15/22 1948     Reason for No Pharmacological VTE Prophylaxis  Once        Question:  Reasons:  Answer:  Risk of Bleeding    11/15/22 1948     Place sequential compression device  Until discontinued         11/15/22 1936                Discharge Planning   ALEXANDER:      Code Status: Full Code   Is the patient medically ready for discharge?:     Reason for patient still in hospital (select all that apply): Treatment  Discharge Plan A: Home                  D Rakesh Lozano MD  Department of Hospital Medicine   [facility] - Intensive Care ([facility]  "

## 2022-11-17 NOTE — ANESTHESIA PREPROCEDURE EVALUATION
11/17/2022  Nithin Wagner is a 67 y.o., male.      Pre-op Assessment    I have reviewed the Patient Summary Reports.     I have reviewed the Nursing Notes. I have reviewed the NPO Status.   I have reviewed the Medications.     Review of Systems  Anesthesia Hx:  Denies Family Hx of Anesthesia complications.  Personal Hx of Anesthesia complications  Difficult Intubation   Social:  Smoker    Hematology/Oncology:  Hematology Normal   Oncology Normal     Cardiovascular:   Hypertension CAD  CABG/stent  CHF H/o atrial myxoma   Pulmonary:   S/p RU lobectomy   Renal/:   Chronic Renal Disease    Hepatic/GI:   Liver Disease, Hepatitis, B    Musculoskeletal:  Musculoskeletal Normal    Neurological:   CVA    Endocrine:   Diabetes  Obesity / BMI > 30      Physical Exam  General: Well nourished, Cooperative, Alert and Oriented    Airway:  Mallampati: III   Mouth Opening: Normal  TM Distance: Normal  Tongue: Normal  Neck ROM: Normal ROM    Dental:  Intact        Anesthesia Plan  Type of Anesthesia, risks & benefits discussed:    Anesthesia Type: Gen ETT  Intra-op Monitoring Plan: Standard ASA Monitors  Post Op Pain Control Plan: multimodal analgesia  Induction:  IV  Airway Plan: Video and Fiberoptic, Post-Induction  Informed Consent: Informed consent signed with the Patient and all parties understand the risks and agree with anesthesia plan.  All questions answered.   ASA Score: 3  Anesthesia Plan Notes: Pt with h/o diffiuclt intubation. Only epiglottis seen with MAC 3 blade on Michaud scope. Able to mask ventilate and successful placement of LMA. Will have bronchoscope in room. MAC 4 blade and LMA backup.    Ready For Surgery From Anesthesia Perspective.     .

## 2022-11-17 NOTE — SUBJECTIVE & OBJECTIVE
Interval History: NAEO. AFVSS. Pain well controlled. Tolerated diet yesterday, currently npo. Slight progression of necrotic area of wound. Cultures pending.    Review of Systems  Objective:     Temp:  [98 °F (36.7 °C)-98.6 °F (37 °C)] 98 °F (36.7 °C)  Pulse:  [] 81  Resp:  [11-40] 22  SpO2:  [89 %-99 %] 95 %  BP: ()/(57-83) 112/75     Body mass index is 35.08 kg/m².           Drains       Drain  Duration                  Urethral Catheter 11/15/22 2145 Non-latex 16 Fr. 1 day                    Physical Exam  Vitals and nursing note reviewed.   Constitutional:       General: He is not in acute distress.     Appearance: Normal appearance.   HENT:      Head: Atraumatic.      Nose: Nose normal.   Eyes:      Extraocular Movements: Extraocular movements intact.      Pupils: Pupils are equal, round, and reactive to light.   Cardiovascular:      Rate and Rhythm: Normal rate.   Pulmonary:      Effort: Pulmonary effort is normal.   Abdominal:      General: Abdomen is flat. There is no distension.      Tenderness: There is no abdominal tenderness. There is no right CVA tenderness or left CVA tenderness.   Genitourinary:     Comments: Open wound with kerlex and ABD pads. Approximately 40% of scrotum removed. Progression of necrotic tissue at apex/inferomedial border. Deep lateral portion of wound with some dusky appearing tissue. No active bleeding source.  Musculoskeletal:         General: Normal range of motion.      Cervical back: Normal range of motion.   Neurological:      General: No focal deficit present.      Mental Status: He is alert and oriented to person, place, and time. Mental status is at baseline.   Psychiatric:         Mood and Affect: Mood normal.         Behavior: Behavior normal.       Significant Labs:    BMP:  Recent Labs   Lab 11/15/22  2248 11/16/22  0549 11/17/22  0357   * 133* 137   K 5.0 5.1 4.5   CL 98 99 104   CO2 25 26 26   BUN 37* 33* 31*   CREATININE 1.5* 1.2 0.9   CALCIUM 8.4*  8.4* 7.7*       CBC:   Recent Labs   Lab 11/15/22  2248 11/16/22  0549 11/17/22  0357   WBC 21.85* 24.13* 17.20*   HGB 14.9 14.1 13.8*   HCT 44.4 42.3 42.0    190 170       Urine Studies: No results for input(s): COLORU, APPEARANCEUA, PHUR, SPECGRAV, PROTEINUA, GLUCUA, KETONESU, BILIRUBINUA, OCCULTUA, NITRITE, UROBILINOGEN, LEUKOCYTESUR, RBCUA, WBCUA, BACTERIA, SQUAMEPITHEL, HYALINECASTS in the last 168 hours.    Invalid input(s): SANDRA    Significant Imaging:  All pertinent imaging results/findings from the past 24 hours have been reviewed.

## 2022-11-17 NOTE — ASSESSMENT & PLAN NOTE
- HgbA1c of 7.6  - Diabetic and cardiac diet started  - Start detemir 25 units subQ b.i.d. and moderate scale sliding scale insulin  - Will consider adding prandial NovoLog after monitoring glucose levels on this regimen  - Will adjust insulin regimen as needed to achieve glucose between 140 - 180

## 2022-11-17 NOTE — PROGRESS NOTES
Pharmacokinetic Assessment Follow Up: IV Vancomycin    Vancomycin serum concentration assessment(s):    The random level was drawn correctly. Drawn < 12 hours after last dose. Can be used to guide therapy at this time.     Vancomycin Regimen Plan:    Change regimen to Vancomycin 1750 mg IV every 12 hours with next serum trough concentration measured at 2100 prior to next dose on 11/18/22.    Drug levels (last 3 results):  Recent Labs   Lab Result Units 11/16/22  1639 11/17/22  0357   Vancomycin, Random ug/mL 5.7 12.0       Pharmacy will continue to follow and monitor vancomycin.    Please contact pharmacy at extension [phone] for questions regarding this assessment.    Thank you for the consult,   Kendal Hutchins       Patient brief summary:  Nithin Wagner is a 67 y.o. male initiated on antimicrobial therapy with IV Vancomycin for treatment of skin & soft tissue infection.    The patient's current regimen is 1750 mg ivpb every 12 hours.    Drug Allergies:   Review of patient's allergies indicates:   Allergen Reactions    Enbrel [etanercept] Shortness Of Breath     CHF    Nsaids (non-steroidal anti-inflammatory drug) Other (See Comments)     hypertention  Other reaction(s): Other (See Comments)  hypertention  HTN    Statins-hmg-coa reductase inhibitors Other (See Comments)     Heart arrythemias  Other reaction(s): Other (See Comments)  Heart arrythemias  Joint pain and cardiac arrythmias    Pcn [penicillins] Rash       Actual Body Weight:   114.1 kg    Renal Function:   Estimated Creatinine Clearance: 99.5 mL/min (based on SCr of 0.9 mg/dL).,     Dialysis Method (if applicable):  N/A    CBC (last 72 hours):  Recent Labs   Lab Result Units 11/15/22  1740 11/15/22  2041 11/15/22  2248 11/16/22  0549 11/17/22  0357   WBC K/uL 22.42*  --  21.85* 24.13* 17.20*   Hemoglobin g/dL 16.9  --  14.9 14.1 13.8*   Hemoglobin A1C %  --  7.6*  --   --   --    Hematocrit % 49.1  --  44.4 42.3 42.0   Platelets K/uL 230  --  188 190 170    Gran % % 76.0*  --  69.0 73.0 87.4*   Lymph % % 8.0*  --  4.0* 6.0* 5.6*   Mono % % 10.0  --  7.0 6.0 5.3   Eosinophil % % 0.0  --  0.0 0.0 0.3   Basophil % % 0.0  --  0.0 0.0 0.2   Differential Method  Manual  --  Manual Manual Automated       Metabolic Panel (last 72 hours):  Recent Labs   Lab Result Units 11/15/22  1740 11/15/22  2248 11/16/22  0549 11/17/22  0357   Sodium mmol/L 131* 131* 133* 137   Potassium mmol/L 6.1* 5.0 5.1 4.5   Chloride mmol/L 94* 98 99 104   CO2 mmol/L 23 25 26 26   Glucose mg/dL 255* 183* 194* 102   BUN mg/dL 44* 37* 33* 31*   Creatinine mg/dL 2.5* 1.5* 1.2 0.9   Albumin g/dL 2.6* 2.1* 2.0* 2.0*   Total Bilirubin mg/dL 1.1* 0.9 0.5 0.3   Alkaline Phosphatase U/L 95 86 89 97   AST U/L 77* 115* 82* 40   ALT U/L 54* 96* 87* 69*   Magnesium mg/dL  --  2.0 2.2 2.7*   Phosphorus mg/dL  --  4.6* 5.2* 3.3       Vancomycin Administrations:  vancomycin given in the last 96 hours                     vancomycin 1.75 g in 5 % dextrose 500 mL IVPB (mg) 1,750 mg New Bag 11/16/22 1834    vancomycin 2 g in dextrose 5 % 500 mL IVPB ()  Restarted 11/15/22 2029      Restarted  1952      Restarted  1914     2,000 mg New Bag  1830                    Microbiologic Results:  Microbiology Results (last 7 days)       Procedure Component Value Units Date/Time    Culture, Anaerobic [[id number]] Collected: 11/15/22 2155    Order Status: Completed Specimen: Abscess from Perineum Updated: 11/17/22 0714     Anaerobic Culture Culture in progress    Narrative:      Left scrotum for culture    Blood culture x two cultures. Draw prior to antibiotics. [[id number]] Collected: 11/15/22 1742    Order Status: Completed Specimen: Blood from Peripheral, Forearm, Left Updated: 11/17/22 0613     Blood Culture, Routine No Growth to date      No Growth to date    Narrative:      Aerobic and anaerobic    Blood culture x two cultures. Draw prior to antibiotics. [[id number]] Collected: 11/15/22 1741    Order Status: Completed Specimen:  Blood from Antecubital, Right Updated: 11/17/22 0613     Blood Culture, Routine No Growth to date      No Growth to date    Narrative:      Aerobic and anaerobic    Aerobic culture [[id number]] Collected: 11/15/22 2139    Order Status: Sent Specimen: Genital from Abscess Updated: 11/16/22 1113    Influenza A & B by Molecular [[id number]]     Order Status: Canceled Specimen: Nasopharyngeal Swab

## 2022-11-17 NOTE — OP NOTE
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 year old male with mary's gangrene 2 days s/p initial excision and debridement.      Procedure Description:  The patient was brought to the operative suite and placed under General anesthesia. He was placed in lithotomy position and prepped and draped in usual sterile fashion.  Time out was performed prior to starting the procedure.    The previous packing was fully removed and the scrotum fully examined. There was some dark skin edges medially but the remaining skin appeared viable. There was some necrotic appearing deeper tissues, mostly near the midline, but overall the wound looked fairly healthy. Using blunt and sharp dissection, the necrotic appearing tissues were excised, including the skin edges on the medial aspect of the wound. Normal bleeding was noted in all areas following excision.    The wound was thoroughly irrigated and packed with kerlex gauze. ABD pads and underwear were placed. He was transferred back to ICU after anesthesia was fully reversed.    Complications: No    Estimated Blood Loss: 0cc         Specimens Removed:   Specimen (24h ago, onward)      None            Implants: * No implants in log *           Disposition: PACU - hemodynamically stable.           Condition: Good    Attestation:  I was present and scrubbed for the entire procedure.

## 2022-11-18 LAB
ALBUMIN SERPL BCP-MCNC: 1.9 G/DL (ref 3.5–5.2)
ALP SERPL-CCNC: 104 U/L (ref 55–135)
ALT SERPL W/O P-5'-P-CCNC: 57 U/L (ref 10–44)
ANION GAP SERPL CALC-SCNC: 6 MMOL/L (ref 8–16)
AST SERPL-CCNC: 32 U/L (ref 10–40)
BASOPHILS # BLD AUTO: 0.03 K/UL (ref 0–0.2)
BASOPHILS NFR BLD: 0.4 % (ref 0–1.9)
BILIRUB SERPL-MCNC: 0.3 MG/DL (ref 0.1–1)
BUN SERPL-MCNC: 19 MG/DL (ref 8–23)
CALCIUM SERPL-MCNC: 7.6 MG/DL (ref 8.7–10.5)
CHLORIDE SERPL-SCNC: 105 MMOL/L (ref 95–110)
CO2 SERPL-SCNC: 28 MMOL/L (ref 23–29)
CREAT SERPL-MCNC: 0.8 MG/DL (ref 0.5–1.4)
DIFFERENTIAL METHOD: ABNORMAL
EOSINOPHIL # BLD AUTO: 0.1 K/UL (ref 0–0.5)
EOSINOPHIL NFR BLD: 1.5 % (ref 0–8)
ERYTHROCYTE [DISTWIDTH] IN BLOOD BY AUTOMATED COUNT: 13.2 % (ref 11.5–14.5)
EST. GFR  (NO RACE VARIABLE): >60 ML/MIN/1.73 M^2
GLUCOSE SERPL-MCNC: 109 MG/DL (ref 70–110)
HCT VFR BLD AUTO: 43.5 % (ref 40–54)
HGB BLD-MCNC: 14.1 G/DL (ref 14–18)
IMM GRANULOCYTES # BLD AUTO: 0.14 K/UL (ref 0–0.04)
IMM GRANULOCYTES NFR BLD AUTO: 1.8 % (ref 0–0.5)
LYMPHOCYTES # BLD AUTO: 1.1 K/UL (ref 1–4.8)
LYMPHOCYTES NFR BLD: 13.9 % (ref 18–48)
MAGNESIUM SERPL-MCNC: 2.2 MG/DL (ref 1.6–2.6)
MCH RBC QN AUTO: 33.1 PG (ref 27–31)
MCHC RBC AUTO-ENTMCNC: 32.4 G/DL (ref 32–36)
MCV RBC AUTO: 102 FL (ref 82–98)
MONOCYTES # BLD AUTO: 0.6 K/UL (ref 0.3–1)
MONOCYTES NFR BLD: 7.6 % (ref 4–15)
NEUTROPHILS # BLD AUTO: 5.9 K/UL (ref 1.8–7.7)
NEUTROPHILS NFR BLD: 74.8 % (ref 38–73)
NRBC BLD-RTO: 0 /100 WBC
PHOSPHATE SERPL-MCNC: 3.3 MG/DL (ref 2.7–4.5)
PLATELET # BLD AUTO: 170 K/UL (ref 150–450)
PMV BLD AUTO: 10.7 FL (ref 9.2–12.9)
POCT GLUCOSE: 105 MG/DL (ref 70–110)
POCT GLUCOSE: 171 MG/DL (ref 70–110)
POCT GLUCOSE: 61 MG/DL (ref 70–110)
POCT GLUCOSE: 77 MG/DL (ref 70–110)
POCT GLUCOSE: 97 MG/DL (ref 70–110)
POTASSIUM SERPL-SCNC: 4.3 MMOL/L (ref 3.5–5.1)
PROT SERPL-MCNC: 4.6 G/DL (ref 6–8.4)
RBC # BLD AUTO: 4.26 M/UL (ref 4.6–6.2)
SODIUM SERPL-SCNC: 139 MMOL/L (ref 136–145)
VANCOMYCIN TROUGH SERPL-MCNC: 17.8 UG/ML (ref 10–22)
WBC # BLD AUTO: 7.9 K/UL (ref 3.9–12.7)

## 2022-11-18 PROCEDURE — 63600175 PHARM REV CODE 636 W HCPCS: Performed by: NURSE PRACTITIONER

## 2022-11-18 PROCEDURE — 25000003 PHARM REV CODE 250: Performed by: UROLOGY

## 2022-11-18 PROCEDURE — 84100 ASSAY OF PHOSPHORUS: CPT | Performed by: NURSE PRACTITIONER

## 2022-11-18 PROCEDURE — 80202 ASSAY OF VANCOMYCIN: CPT | Performed by: UROLOGY

## 2022-11-18 PROCEDURE — S0030 INJECTION, METRONIDAZOLE: HCPCS | Performed by: UROLOGY

## 2022-11-18 PROCEDURE — 63600175 PHARM REV CODE 636 W HCPCS: Performed by: INTERNAL MEDICINE

## 2022-11-18 PROCEDURE — 83735 ASSAY OF MAGNESIUM: CPT | Performed by: NURSE PRACTITIONER

## 2022-11-18 PROCEDURE — 63600175 PHARM REV CODE 636 W HCPCS: Performed by: STUDENT IN AN ORGANIZED HEALTH CARE EDUCATION/TRAINING PROGRAM

## 2022-11-18 PROCEDURE — 25000003 PHARM REV CODE 250: Performed by: INTERNAL MEDICINE

## 2022-11-18 PROCEDURE — 80053 COMPREHEN METABOLIC PANEL: CPT | Performed by: NURSE PRACTITIONER

## 2022-11-18 PROCEDURE — 85025 COMPLETE CBC W/AUTO DIFF WBC: CPT | Performed by: NURSE PRACTITIONER

## 2022-11-18 PROCEDURE — 25000003 PHARM REV CODE 250: Performed by: NURSE PRACTITIONER

## 2022-11-18 PROCEDURE — 99233 SBSQ HOSP IP/OBS HIGH 50: CPT | Mod: ,,, | Performed by: INTERNAL MEDICINE

## 2022-11-18 PROCEDURE — 11000001 HC ACUTE MED/SURG PRIVATE ROOM

## 2022-11-18 PROCEDURE — 36415 COLL VENOUS BLD VENIPUNCTURE: CPT | Performed by: UROLOGY

## 2022-11-18 PROCEDURE — 99233 PR SUBSEQUENT HOSPITAL CARE,LEVL III: ICD-10-PCS | Mod: ,,, | Performed by: INTERNAL MEDICINE

## 2022-11-18 PROCEDURE — 36415 COLL VENOUS BLD VENIPUNCTURE: CPT | Performed by: NURSE PRACTITIONER

## 2022-11-18 RX ORDER — CEFEPIME HYDROCHLORIDE 1 G/50ML
1 INJECTION, SOLUTION INTRAVENOUS
Status: DISCONTINUED | OUTPATIENT
Start: 2022-11-18 | End: 2022-12-01 | Stop reason: HOSPADM

## 2022-11-18 RX ORDER — MORPHINE SULFATE 2 MG/ML
1 INJECTION, SOLUTION INTRAMUSCULAR; INTRAVENOUS ONCE
Status: COMPLETED | OUTPATIENT
Start: 2022-11-18 | End: 2022-11-18

## 2022-11-18 RX ADMIN — METRONIDAZOLE 500 MG: 500 INJECTION, SOLUTION INTRAVENOUS at 01:11

## 2022-11-18 RX ADMIN — CEFEPIME HYDROCHLORIDE 1 G: 1 INJECTION, SOLUTION INTRAVENOUS at 05:11

## 2022-11-18 RX ADMIN — MUPIROCIN: 20 OINTMENT TOPICAL at 09:11

## 2022-11-18 RX ADMIN — METRONIDAZOLE 500 MG: 500 INJECTION, SOLUTION INTRAVENOUS at 04:11

## 2022-11-18 RX ADMIN — INSULIN ASPART 1 UNITS: 100 INJECTION, SOLUTION INTRAVENOUS; SUBCUTANEOUS at 12:11

## 2022-11-18 RX ADMIN — OXYCODONE HYDROCHLORIDE AND ACETAMINOPHEN 1 TABLET: 5; 325 TABLET ORAL at 09:11

## 2022-11-18 RX ADMIN — MORPHINE SULFATE 4 MG: 4 INJECTION, SOLUTION INTRAMUSCULAR; INTRAVENOUS at 07:11

## 2022-11-18 RX ADMIN — CLINDAMYCIN PHOSPHATE 900 MG: 900 INJECTION, SOLUTION INTRAVENOUS at 01:11

## 2022-11-18 RX ADMIN — MORPHINE SULFATE 4 MG: 4 INJECTION, SOLUTION INTRAMUSCULAR; INTRAVENOUS at 02:11

## 2022-11-18 RX ADMIN — MORPHINE SULFATE 1 MG: 2 INJECTION, SOLUTION INTRAMUSCULAR; INTRAVENOUS at 10:11

## 2022-11-18 RX ADMIN — INSULIN DETEMIR 18 UNITS: 100 INJECTION, SOLUTION SUBCUTANEOUS at 09:11

## 2022-11-18 RX ADMIN — CLINDAMYCIN PHOSPHATE 900 MG: 900 INJECTION, SOLUTION INTRAVENOUS at 05:11

## 2022-11-18 RX ADMIN — MORPHINE SULFATE 4 MG: 4 INJECTION, SOLUTION INTRAMUSCULAR; INTRAVENOUS at 11:11

## 2022-11-18 RX ADMIN — FOLIC ACID 1 MG: 1 TABLET ORAL at 09:11

## 2022-11-18 RX ADMIN — VANCOMYCIN HYDROCHLORIDE 1750 MG: 750 INJECTION, POWDER, LYOPHILIZED, FOR SOLUTION INTRAVENOUS at 10:11

## 2022-11-18 RX ADMIN — EZETIMIBE 10 MG: 10 TABLET ORAL at 09:11

## 2022-11-18 RX ADMIN — DIGOXIN 125 MCG: 125 TABLET ORAL at 09:11

## 2022-11-18 RX ADMIN — MORPHINE SULFATE 4 MG: 4 INJECTION, SOLUTION INTRAMUSCULAR; INTRAVENOUS at 09:11

## 2022-11-18 RX ADMIN — VANCOMYCIN HYDROCHLORIDE 1750 MG: 750 INJECTION, POWDER, LYOPHILIZED, FOR SOLUTION INTRAVENOUS at 11:11

## 2022-11-18 RX ADMIN — METRONIDAZOLE 500 MG: 500 INJECTION, SOLUTION INTRAVENOUS at 09:11

## 2022-11-18 RX ADMIN — OXYCODONE HYDROCHLORIDE AND ACETAMINOPHEN 1 TABLET: 5; 325 TABLET ORAL at 06:11

## 2022-11-18 RX ADMIN — SODIUM CHLORIDE: 0.9 INJECTION, SOLUTION INTRAVENOUS at 12:11

## 2022-11-18 RX ADMIN — ACETAMINOPHEN 650 MG: 325 TABLET, FILM COATED ORAL at 12:11

## 2022-11-18 RX ADMIN — CLINDAMYCIN PHOSPHATE 900 MG: 900 INJECTION, SOLUTION INTRAVENOUS at 09:11

## 2022-11-18 RX ADMIN — THERA TABS 1 TABLET: TAB at 09:11

## 2022-11-18 RX ADMIN — ASPIRIN 81 MG CHEWABLE TABLET 81 MG: 81 TABLET CHEWABLE at 09:11

## 2022-11-18 RX ADMIN — CEFEPIME HYDROCHLORIDE 1 G: 1 INJECTION, SOLUTION INTRAVENOUS at 09:11

## 2022-11-18 RX ADMIN — MORPHINE SULFATE 4 MG: 4 INJECTION, SOLUTION INTRAMUSCULAR; INTRAVENOUS at 03:11

## 2022-11-18 RX ADMIN — ACETAMINOPHEN 650 MG: 325 TABLET, FILM COATED ORAL at 09:11

## 2022-11-18 NOTE — ASSESSMENT & PLAN NOTE
Mary's Gangrene of Scrotum  Leukocytosis  Lactic acidosis  Elevated troponin  - Patient criteria for sepsis with infective focus of Mary gangrene of the scrotum  - Lactic acid level 4.4, procalcitonin >48, WBC 24, and elevated troponin levels that trended down  - Urology consulted and patient underwent excision and debridement of necrotic scrotal tissue on 11/15; repeat debridement 11/17.  - Continue cefepime 1g IV q.12, clindamycin 900 mg IV Q 8, metronidazole 500 mg IV Q 8 and vancomycin dosed by pharmacy  - Elevated troponins likely secondary to sepsis and setting of DONNA given no reported chest pain and no acute EKG changes  - Blood cultures no growth to date and wound cultures pending  - Wound care ordered and Urology following  - Clinically improving with resolution of lactic acidosis and definitive debridement  - Appreciate urology assistance.

## 2022-11-18 NOTE — PLAN OF CARE
Problem: Adult Inpatient Plan of Care  Goal: Plan of Care Review  Outcome: Ongoing, Progressing  Flowsheets (Taken 11/18/2022 0349)  Plan of Care Reviewed With: patient  Goal: Absence of Hospital-Acquired Illness or Injury  Outcome: Ongoing, Progressing  Intervention: Identify and Manage Fall Risk  Flowsheets (Taken 11/18/2022 0349)  Safety Promotion/Fall Prevention:   assistive device/personal item within reach   bed alarm set   medications reviewed   pulse ox   nonskid shoes/socks when out of bed   side rails raised x 3   instructed to call staff for mobility  Intervention: Prevent Skin Injury  Flowsheets (Taken 11/18/2022 0349)  Body Position: position changed independently  Skin Protection:   tubing/devices free from skin contact   incontinence pads utilized  Intervention: Prevent and Manage VTE (Venous Thromboembolism) Risk  Flowsheets (Taken 11/18/2022 0349)  VTE Prevention/Management:   remove, assess skin, and reapply sequential compression device   intravenous hydration   ROM (active) performed  Goal: Optimal Comfort and Wellbeing  Outcome: Ongoing, Progressing  Intervention: Monitor Pain and Promote Comfort  Flowsheets (Taken 11/18/2022 0349)  Pain Management Interventions:   awakened for pain meds per patient request   care clustered   medication offered   pain management plan reviewed with patient/caregiver   quiet environment facilitated     Problem: Impaired Wound Healing  Goal: Optimal Wound Healing  Outcome: Ongoing, Progressing  Intervention: Promote Wound Healing  Flowsheets (Taken 11/18/2022 0349)  Pain Management Interventions:   awakened for pain meds per patient request   care clustered   medication offered   pain management plan reviewed with patient/caregiver   quiet environment facilitated     Problem: Bleeding (Sepsis/Septic Shock)  Goal: Absence of Bleeding  Outcome: Ongoing, Progressing  Intervention: Monitor and Manage Bleeding  Flowsheets (Taken 11/18/2022 0349)  Bleeding  Precautions:   blood pressure closely monitored   monitored for signs of bleeding  Bleeding Management:   dressing monitored   packing maintained     Problem: Infection Progression (Sepsis/Septic Shock)  Goal: Absence of Infection Signs and Symptoms  Intervention: Initiate Sepsis Management  Flowsheets (Taken 11/18/2022 0349)  Infection Prevention: environmental surveillance performed  Infection Management: aseptic technique maintained     Problem: Diabetes Comorbidity  Goal: Blood Glucose Level Within Targeted Range  Outcome: Ongoing, Progressing  Intervention: Monitor and Manage Glycemia  Flowsheets (Taken 11/18/2022 0349)  Glycemic Management:   blood glucose monitored   supplemental insulin given     Problem: Infection  Goal: Absence of Infection Signs and Symptoms  Outcome: Ongoing, Progressing  Intervention: Prevent or Manage Infection  Flowsheets (Taken 11/18/2022 0349)  Infection Management: aseptic technique maintained     Problem: Fall Injury Risk  Goal: Absence of Fall and Fall-Related Injury  Outcome: Ongoing, Progressing  Intervention: Identify and Manage Contributors  Flowsheets (Taken 11/18/2022 0349)  Self-Care Promotion:   independence encouraged   BADL personal objects within reach  Medication Review/Management: medications reviewed  Intervention: Promote Injury-Free Environment  Flowsheets (Taken 11/18/2022 0349)  Safety Promotion/Fall Prevention:   assistive device/personal item within reach   bed alarm set   medications reviewed   pulse ox   nonskid shoes/socks when out of bed   side rails raised x 3   instructed to call staff for mobility

## 2022-11-18 NOTE — PROGRESS NOTES
[facility] Intensive Care [facility] Medicine  Progress Note    Patient Name: Nithin Wagner  MRN: [id number]  Patient Class: IP- Inpatient   Admission Date: 11/15/2022  Length of Stay: 3 days  Attending Physician: CARISA Lozano MD  Primary Care Provider: Tushar Drew MD        Subjective:     Principal Problem:Sepsis        HPI:  The patient is a 67M with a past medical history of DM2, CAD, HTN, and HLD who presents with scrotal pain and swelling. Started about 4 weeks ago and has been progressively worsening despite a couple courses of antibiotics (unsure which ones). He reports fevers and chills at home, nausea, decreased PO intake, and multiple syncopal episodes with +incontinence of stool. Unable to urinate for the past 24 hours or so. Sugars 190s at home. He was seen yesterday in urgent care and referred to the ER, but says he wasn't able to make it at that time so he comes today instead. On initial workup, the patient has CT evidence of Fourniers gangrene.  He will be brought to the operating room emergently and admitted after to ICU for further management.      Overview/Hospital Course:  Mr. Wagner presented with scrotal pain and swelling.  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  Empiric initiated with cefepime, clindamycin, metronidazole, and vancomycin.  Urology consulted and he underwent emergent excision and debridement of necrotic tissue on 11/15. Repeat washout performed 11/17. Improved and stepped down from ICU 11/18.      Interval History: No acute events overnight. Reports scrotal pain with dressing change. Glucoses low.    Review of Systems   Constitutional:  Negative for chills and fever.   Respiratory:  Negative for cough and shortness of breath.    Cardiovascular:  Negative for chest pain and palpitations.   Gastrointestinal:  Negative for abdominal pain, nausea and vomiting.   Genitourinary:  Positive for scrotal swelling.   Objective:     Vital Signs (Most  Recent):  Temp: 98.7 °F (37.1 °C) (11/18/22 1501)  Pulse: 100 (11/18/22 1801)  Resp: 18 (11/18/22 1909)  BP: 133/89 (11/18/22 1801)  SpO2: (!) 92 % (11/18/22 1801)   Vital Signs (24h Range):  Temp:  [98 °F (36.7 °C)-98.7 °F (37.1 °C)] 98.7 °F (37.1 °C)  Pulse:  [] 100  Resp:  [12-43] 18  SpO2:  [92 %-99 %] 92 %  BP: (109-156)/(56-98) 133/89     Weight: 114.1 kg (251 lb 8.7 oz)  Body mass index is 35.08 kg/m².    Intake/Output Summary (Last 24 hours) at 11/18/2022 1916  Last data filed at 11/18/2022 1817  Gross per 24 hour   Intake 1603.47 ml   Output 5075 ml   Net -3471.53 ml        Physical Exam  Vitals and nursing note reviewed.   Constitutional:       General: He is not in acute distress.     Appearance: He is well-developed. He is obese.   HENT:      Head: Normocephalic and atraumatic.   Eyes:      General:         Right eye: No discharge.         Left eye: No discharge.      Conjunctiva/sclera: Conjunctivae normal.   Cardiovascular:      Rate and Rhythm: Normal rate.      Pulses: Normal pulses.   Pulmonary:      Effort: Pulmonary effort is normal. No respiratory distress.   Abdominal:      Palpations: Abdomen is soft.      Tenderness: There is no abdominal tenderness.   Musculoskeletal:         General: Normal range of motion.      Right lower leg: No edema.      Left lower leg: No edema.   Skin:     General: Skin is warm and dry.   Neurological:      Mental Status: He is alert and oriented to person, place, and time.     Significant Labs:   CBC:  Recent Labs   Lab 11/16/22  0549 11/17/22  0357 11/18/22  0351   WBC 24.13* 17.20* 7.90   HGB 14.1 13.8* 14.1   HCT 42.3 42.0 43.5    170 170   GRAN 73.0 87.4*  15.0* 74.8*  5.9   LYMPH 6.0*  CANCELED 5.6*  1.0 13.9*  1.1   MONO 6.0  CANCELED 5.3  0.9 7.6  0.6   EOS CANCELED 0.1 0.1   BASO CANCELED 0.04 0.03     CMP:  Recent Labs   Lab 11/16/22  0549 11/17/22  0357 11/18/22  0351   * 137 139   K 5.1 4.5 4.3   CL 99 104 105   CO2 26 26 28    BUN 33* 31* 19   CREATININE 1.2 0.9 0.8   * 102 109   CALCIUM 8.4* 7.7* 7.6*   MG 2.2 2.7* 2.2   PHOS 5.2* 3.3 3.3   ALKPHOS 89 97 104   AST 82* 40 32   ALT 87* 69* 57*   BILITOT 0.5 0.3 0.3   PROT 5.3* 4.6* 4.6*   ALBUMIN 2.0* 2.0* 1.9*   ANIONGAP 8 7* 6*        Significant Imaging:   No new imaging this morning.      Assessment/Plan:      * Sepsis  Mary's Gangrene of Scrotum  Leukocytosis  Lactic acidosis  Elevated troponin  - Patient criteria for sepsis with infective focus of Mary gangrene of the scrotum  - Lactic acid level 4.4, procalcitonin >48, WBC 24, and elevated troponin levels that trended down  - Urology consulted and patient underwent excision and debridement of necrotic scrotal tissue on 11/15; repeat debridement 11/17.  - Continue cefepime 1g IV q.12, clindamycin 900 mg IV Q 8, metronidazole 500 mg IV Q 8 and vancomycin dosed by pharmacy  - Elevated troponins likely secondary to sepsis and setting of DONNA given no reported chest pain and no acute EKG changes  - Blood cultures no growth to date and wound cultures in process.  - Wound care ordered and Urology following  - Clinically improving with resolution of lactic acidosis and definitive debridement  - Appreciate urology assistance.    Chronic diastolic heart failure  CAD  - Last echo with normal EF and grade 1 diastolic dysfunction on 10/28/2022  - No overt signs of volume overload and no reported chest pain  - Elevated troponin due to sepsis with associated hypotension along with DONNA and CHF which were trended down and no ectopy noted on telemetry, and no acute EKG changes  - Continue digoxin 125 mcg p.o. daily, ASA 81 mg p.o. daily and ezetimibe 10 mg p.o. daily  - Continue to hold Lasix and hydrochlorothiazide due to sepsis, will resume medications when clinically indicated    DONNA (acute kidney injury)  - Creatinine 2.5, baseline- 1.3; likely due to hypotension/sepsis  - Improved with IV fluids and treatment of sepsis  -  "Creatinine down to 1.2 today  - Continue monitor with repeat labs and follow I/O's    Hyperkalemia  - Potassium was 6.1 on admission with new DONNA  - Patient takes KCL supplementation daily along with aldactone  - Holding Aldactone and KCl supplementation  - Potassium level has normalized to 5.1 today  - Continue to monitor    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  - Glucoses low; decrease insulin detemir to 18U subQ BID, continue moderate-dose sliding scale insulin aspart 1-10U subQ TIDWM PRN.  - Will adjust insulin regimen as needed to achieve glucose between 140 - 180    Essential hypertension  - Hypotensive was on admit that responded to treatment with IV fluids with no need for pressor support  - Blood pressure predominantly in normal range; monitor.  - Continue to hold antihypertensives for now.    VTE Risk Mitigation (From admission, onward)         Ordered     IP VTE HIGH RISK PATIENT  Once         11/15/22 1948     Reason for No Pharmacological VTE Prophylaxis  Once        Question:  Reasons:  Answer:  Risk of Bleeding    11/15/22 1948     Place sequential compression device  Until discontinued         11/15/22 1936                Discharge Planning   ALEXANDER:      Code Status: Full Code   Is the patient medically ready for discharge?:     Reason for patient still in hospital (select all that apply): Treatment  Discharge Plan A: Home                  COURTNEY Lozano MD  Department of Hospital Medicine   [facility] - Intensive Care ([facility]  "

## 2022-11-18 NOTE — ASSESSMENT & PLAN NOTE
68 yo male who presented with Mary's. S/p excision and debridement of scrotum 11/15/22 and 11/17/22.    - Admitted to hospital medicine, appreciate assistance  - Second debridement 11/17 in OR. Clean wound edges  - Diet, insulin and pain control per primary    - Continue broad spectrum antibiotics  - Follow up cultures, NGTD  - Dressing changes BID per wound care. Will attempt to be at bedside during their dressing change  - Rest of care per primary    Dispo: Okay for stepdown, follow up wound care recs

## 2022-11-18 NOTE — SUBJECTIVE & OBJECTIVE
Interval History: NAEO. AFVSS. Debridement in OR yesterday afternoon with removal of small amount of additional necrotic tissue. No growth on cultures at this time.    Review of Systems  Objective:     Temp:  [97.5 °F (36.4 °C)-98.4 °F (36.9 °C)] 98 °F (36.7 °C)  Pulse:  [] 84  Resp:  [12-43] 22  SpO2:  [93 %-98 %] 98 %  BP: (109-156)/(56-98) 130/78     Body mass index is 35.08 kg/m².           Drains       Drain  Duration                  Urethral Catheter 11/15/22 2145 Non-latex 16 Fr. 2 days                    Physical Exam  Vitals and nursing note reviewed.   Constitutional:       General: He is not in acute distress.     Appearance: Normal appearance.   HENT:      Head: Atraumatic.      Nose: Nose normal.   Eyes:      Extraocular Movements: Extraocular movements intact.      Pupils: Pupils are equal, round, and reactive to light.   Cardiovascular:      Rate and Rhythm: Normal rate.   Pulmonary:      Effort: Pulmonary effort is normal.   Abdominal:      General: Abdomen is flat. There is no distension.      Tenderness: There is no abdominal tenderness. There is no right CVA tenderness or left CVA tenderness.   Genitourinary:     Comments: Open wound with kerlex and ABD pads. Dressing clean and intact. Approximately 40% of scrotum removed. No active bleeding source.  Musculoskeletal:         General: Normal range of motion.      Cervical back: Normal range of motion.   Neurological:      General: No focal deficit present.      Mental Status: He is alert and oriented to person, place, and time.   Psychiatric:         Mood and Affect: Mood normal.         Behavior: Behavior normal.       Significant Labs:    BMP:  Recent Labs   Lab 11/16/22  0549 11/17/22  0357 11/18/22 0351   * 137 139   K 5.1 4.5 4.3   CL 99 104 105   CO2 26 26 28   BUN 33* 31* 19   CREATININE 1.2 0.9 0.8   CALCIUM 8.4* 7.7* 7.6*       CBC:   Recent Labs   Lab 11/16/22  0549 11/17/22  0357 11/18/22  0351   WBC 24.13* 17.20* 7.90    HGB 14.1 13.8* 14.1   HCT 42.3 42.0 43.5    170 170       Urine Studies: No results for input(s): COLORU, APPEARANCEUA, PHUR, SPECGRAV, PROTEINUA, GLUCUA, KETONESU, BILIRUBINUA, OCCULTUA, NITRITE, UROBILINOGEN, LEUKOCYTESUR, RBCUA, WBCUA, BACTERIA, SQUAMEPITHEL, HYALINECASTS in the last 168 hours.    Invalid input(s): SANDRA    Significant Imaging:  All pertinent imaging results/findings from the past 24 hours have been reviewed.

## 2022-11-18 NOTE — ASSESSMENT & PLAN NOTE
- HgbA1c of 7.6  - Diabetic and cardiac diet started  - Continue insulin detemir 16U this AM; resume 25 units subQ BID, moderate-dose sliding scale insulin aspart 1-10U subQ TIDWM PRN.  - Will adjust insulin regimen as needed to achieve glucose between 140 - 180

## 2022-11-18 NOTE — PROGRESS NOTES
Pharmacist Renal Dose Adjustment Note    Nithin Wagner is a 67 y.o. male being treated with the medication Cefepime.    Patient Data:    Vital Signs (Most Recent):  Temp: 98 °F (36.7 °C) (11/18/22 0500)  Pulse: 84 (11/18/22 0600)  Resp: (!) 22 (11/18/22 0646)  BP: 130/78 (11/18/22 0600)  SpO2: 98 % (11/18/22 0600)   Vital Signs (72h Range):  Temp:  [97.5 °F (36.4 °C)-99.9 °F (37.7 °C)]   Pulse:  []   Resp:  [11-43]   BP: ()/(46-98)   SpO2:  [89 %-99 %]      Recent Labs   Lab 11/16/22  0549 11/17/22  0357 11/18/22  0351   CREATININE 1.2 0.9 0.8     Serum creatinine: 0.8 mg/dL 11/18/22 0351  Estimated creatinine clearance: 111.9 mL/min    Medication:Cefepime dose: 1 gram frequency every 12 hours will be changed to medication:Cefepime dose:1 gram frequency:every 8 hours.    Pharmacist's Name: Kendal Hutchins  Pharmacist's Extension: [phone]

## 2022-11-18 NOTE — SUBJECTIVE & OBJECTIVE
Interval History: No acute events overnight. Reports scrotal pain with dressing change. WBCs trending down. No other concerns at this time.    Review of Systems   Constitutional:  Negative for chills and fever.   Respiratory:  Negative for cough and shortness of breath.    Cardiovascular:  Negative for chest pain and palpitations.   Gastrointestinal:  Negative for abdominal pain, nausea and vomiting.   Genitourinary:  Positive for scrotal swelling.   Objective:     Vital Signs (Most Recent):  Temp: 97.5 °F (36.4 °C) (11/17/22 1915)  Pulse: 97 (11/17/22 1945)  Resp: (!) 22 (11/17/22 1945)  BP: (!) 109/56 (11/17/22 1945)  SpO2: 96 % (11/17/22 1945)   Vital Signs (24h Range):  Temp:  [97.5 °F (36.4 °C)-98.4 °F (36.9 °C)] 97.5 °F (36.4 °C)  Pulse:  [] 97  Resp:  [11-43] 22  SpO2:  [89 %-98 %] 96 %  BP: ()/(56-83) 109/56     Weight: 114.1 kg (251 lb 8.7 oz)  Body mass index is 35.08 kg/m².    Intake/Output Summary (Last 24 hours) at 11/17/2022 2002  Last data filed at 11/17/2022 1830  Gross per 24 hour   Intake 3902.63 ml   Output 2000 ml   Net 1902.63 ml      Physical Exam  Vitals and nursing note reviewed.   Constitutional:       General: He is not in acute distress.     Appearance: He is well-developed. He is obese.   HENT:      Head: Normocephalic and atraumatic.   Eyes:      General:         Right eye: No discharge.         Left eye: No discharge.      Conjunctiva/sclera: Conjunctivae normal.   Cardiovascular:      Rate and Rhythm: Normal rate.      Pulses: Normal pulses.   Pulmonary:      Effort: Pulmonary effort is normal. No respiratory distress.   Abdominal:      Palpations: Abdomen is soft.      Tenderness: There is no abdominal tenderness.   Musculoskeletal:         General: Normal range of motion.      Right lower leg: No edema.      Left lower leg: No edema.   Skin:     General: Skin is warm and dry.   Neurological:      Mental Status: He is alert and oriented to person, place, and time.      Significant Labs:   CBC:  Recent Labs   Lab 11/15/22  2248 11/16/22  0549 11/17/22  0357   WBC 21.85* 24.13* 17.20*   HGB 14.9 14.1 13.8*   HCT 44.4 42.3 42.0    190 170   GRAN 69.0 73.0 87.4*  15.0*   LYMPH 4.0*  CANCELED 6.0*  CANCELED 5.6*  1.0   MONO 7.0  CANCELED 6.0  CANCELED 5.3  0.9   EOS CANCELED CANCELED 0.1   BASO CANCELED CANCELED 0.04   CMP:  Recent Labs   Lab 11/15/22  2248 11/16/22  0549 11/17/22 0357   * 133* 137   K 5.0 5.1 4.5   CL 98 99 104   CO2 25 26 26   BUN 37* 33* 31*   CREATININE 1.5* 1.2 0.9   * 194* 102   CALCIUM 8.4* 8.4* 7.7*   MG 2.0 2.2 2.7*   PHOS 4.6* 5.2* 3.3   ALKPHOS 86 89 97   * 82* 40   ALT 96* 87* 69*   BILITOT 0.9 0.5 0.3   PROT 5.3* 5.3* 4.6*   ALBUMIN 2.1* 2.0* 2.0*   ANIONGAP 8 8 7*      Significant Imaging:   No new imaging this morning.

## 2022-11-19 LAB
BACTERIA SPEC AEROBE CULT: NO GROWTH
POCT GLUCOSE: 123 MG/DL (ref 70–110)
POCT GLUCOSE: 141 MG/DL (ref 70–110)
POCT GLUCOSE: 150 MG/DL (ref 70–110)
POCT GLUCOSE: 36 MG/DL (ref 70–110)
POCT GLUCOSE: 95 MG/DL (ref 70–110)

## 2022-11-19 PROCEDURE — 63600175 PHARM REV CODE 636 W HCPCS: Performed by: INTERNAL MEDICINE

## 2022-11-19 PROCEDURE — 25000003 PHARM REV CODE 250: Performed by: INTERNAL MEDICINE

## 2022-11-19 PROCEDURE — 25000003 PHARM REV CODE 250: Performed by: UROLOGY

## 2022-11-19 PROCEDURE — 99233 PR SUBSEQUENT HOSPITAL CARE,LEVL III: ICD-10-PCS | Mod: ,,, | Performed by: HOSPITALIST

## 2022-11-19 PROCEDURE — 99233 SBSQ HOSP IP/OBS HIGH 50: CPT | Mod: ,,, | Performed by: HOSPITALIST

## 2022-11-19 PROCEDURE — 99233 SBSQ HOSP IP/OBS HIGH 50: CPT | Mod: ,,, | Performed by: UROLOGY

## 2022-11-19 PROCEDURE — 99900035 HC TECH TIME PER 15 MIN (STAT)

## 2022-11-19 PROCEDURE — 11000001 HC ACUTE MED/SURG PRIVATE ROOM

## 2022-11-19 PROCEDURE — S0030 INJECTION, METRONIDAZOLE: HCPCS | Performed by: UROLOGY

## 2022-11-19 PROCEDURE — 63600175 PHARM REV CODE 636 W HCPCS: Performed by: NURSE PRACTITIONER

## 2022-11-19 PROCEDURE — 99233 PR SUBSEQUENT HOSPITAL CARE,LEVL III: ICD-10-PCS | Mod: ,,, | Performed by: UROLOGY

## 2022-11-19 PROCEDURE — 63600175 PHARM REV CODE 636 W HCPCS: Performed by: HOSPITALIST

## 2022-11-19 PROCEDURE — 25000003 PHARM REV CODE 250: Performed by: NURSE PRACTITIONER

## 2022-11-19 PROCEDURE — 94761 N-INVAS EAR/PLS OXIMETRY MLT: CPT

## 2022-11-19 PROCEDURE — 27000221 HC OXYGEN, UP TO 24 HOURS

## 2022-11-19 RX ORDER — MORPHINE SULFATE 2 MG/ML
6 INJECTION, SOLUTION INTRAMUSCULAR; INTRAVENOUS EVERY 4 HOURS PRN
Status: DISCONTINUED | OUTPATIENT
Start: 2022-11-19 | End: 2022-12-01 | Stop reason: HOSPADM

## 2022-11-19 RX ADMIN — OXYCODONE HYDROCHLORIDE AND ACETAMINOPHEN 1 TABLET: 5; 325 TABLET ORAL at 04:11

## 2022-11-19 RX ADMIN — METRONIDAZOLE 500 MG: 500 INJECTION, SOLUTION INTRAVENOUS at 08:11

## 2022-11-19 RX ADMIN — MORPHINE SULFATE 6 MG: 2 INJECTION, SOLUTION INTRAMUSCULAR; INTRAVENOUS at 01:11

## 2022-11-19 RX ADMIN — OXYCODONE HYDROCHLORIDE AND ACETAMINOPHEN 1 TABLET: 5; 325 TABLET ORAL at 06:11

## 2022-11-19 RX ADMIN — METRONIDAZOLE 500 MG: 500 INJECTION, SOLUTION INTRAVENOUS at 01:11

## 2022-11-19 RX ADMIN — MORPHINE SULFATE 6 MG: 2 INJECTION, SOLUTION INTRAMUSCULAR; INTRAVENOUS at 09:11

## 2022-11-19 RX ADMIN — CEFEPIME HYDROCHLORIDE 1 G: 1 INJECTION, SOLUTION INTRAVENOUS at 09:11

## 2022-11-19 RX ADMIN — ONDANSETRON 4 MG: 2 INJECTION INTRAMUSCULAR; INTRAVENOUS at 09:11

## 2022-11-19 RX ADMIN — OXYCODONE HYDROCHLORIDE AND ACETAMINOPHEN 1 TABLET: 5; 325 TABLET ORAL at 10:11

## 2022-11-19 RX ADMIN — THERA TABS 1 TABLET: TAB at 09:11

## 2022-11-19 RX ADMIN — METRONIDAZOLE 500 MG: 500 INJECTION, SOLUTION INTRAVENOUS at 06:11

## 2022-11-19 RX ADMIN — CEFEPIME HYDROCHLORIDE 1 G: 1 INJECTION, SOLUTION INTRAVENOUS at 03:11

## 2022-11-19 RX ADMIN — CLINDAMYCIN PHOSPHATE 900 MG: 900 INJECTION, SOLUTION INTRAVENOUS at 09:11

## 2022-11-19 RX ADMIN — MORPHINE SULFATE 4 MG: 4 INJECTION, SOLUTION INTRAMUSCULAR; INTRAVENOUS at 09:11

## 2022-11-19 RX ADMIN — CLINDAMYCIN PHOSPHATE 900 MG: 900 INJECTION, SOLUTION INTRAVENOUS at 05:11

## 2022-11-19 RX ADMIN — CEFEPIME HYDROCHLORIDE 1 G: 1 INJECTION, SOLUTION INTRAVENOUS at 04:11

## 2022-11-19 RX ADMIN — MUPIROCIN: 20 OINTMENT TOPICAL at 08:11

## 2022-11-19 RX ADMIN — VANCOMYCIN HYDROCHLORIDE 1750 MG: 750 INJECTION, POWDER, LYOPHILIZED, FOR SOLUTION INTRAVENOUS at 10:11

## 2022-11-19 RX ADMIN — ASPIRIN 81 MG CHEWABLE TABLET 81 MG: 81 TABLET CHEWABLE at 09:11

## 2022-11-19 RX ADMIN — ONDANSETRON 4 MG: 2 INJECTION INTRAMUSCULAR; INTRAVENOUS at 02:11

## 2022-11-19 RX ADMIN — VANCOMYCIN HYDROCHLORIDE 1750 MG: 750 INJECTION, POWDER, LYOPHILIZED, FOR SOLUTION INTRAVENOUS at 09:11

## 2022-11-19 RX ADMIN — OXYCODONE HYDROCHLORIDE AND ACETAMINOPHEN 1 TABLET: 5; 325 TABLET ORAL at 02:11

## 2022-11-19 RX ADMIN — MORPHINE SULFATE 4 MG: 4 INJECTION, SOLUTION INTRAMUSCULAR; INTRAVENOUS at 03:11

## 2022-11-19 RX ADMIN — CLINDAMYCIN PHOSPHATE 900 MG: 900 INJECTION, SOLUTION INTRAVENOUS at 04:11

## 2022-11-19 RX ADMIN — EZETIMIBE 10 MG: 10 TABLET ORAL at 09:11

## 2022-11-19 RX ADMIN — ACETAMINOPHEN 650 MG: 325 TABLET, FILM COATED ORAL at 11:11

## 2022-11-19 RX ADMIN — INSULIN DETEMIR 18 UNITS: 100 INJECTION, SOLUTION SUBCUTANEOUS at 09:11

## 2022-11-19 RX ADMIN — MUPIROCIN: 20 OINTMENT TOPICAL at 09:11

## 2022-11-19 RX ADMIN — FOLIC ACID 1 MG: 1 TABLET ORAL at 09:11

## 2022-11-19 RX ADMIN — DIGOXIN 125 MCG: 125 TABLET ORAL at 09:11

## 2022-11-19 NOTE — ASSESSMENT & PLAN NOTE
- Hypotensive was on admit that responded to treatment with IV fluids with no need for pressor support  - Blood pressure predominantly in normal range; monitor.  - Continue to hold antihypertensives for now.

## 2022-11-19 NOTE — ASSESSMENT & PLAN NOTE
CAD  - Last echo with normal EF and grade 1 diastolic dysfunction on 10/28/2022  - No overt signs of volume overload and no reported chest pain  - Elevated troponin due to sepsis with associated hypotension along with DONNA and CHF which were trended down and no ectopy noted on telemetry, and no acute EKG changes  - Continue digoxin 125 mcg p.o. daily, ASA 81 mg p.o. daily and ezetimibe 10 mg p.o. daily  - Continue to hold Lasix and hydrochlorothiazide due to sepsis, will resume medications when clinically indicated   Patient presents today for suprapubic catheter insertion.  Procedure explained to patient.  16 Fr all silicone catheter removed without difficulty, 6 mL removed from balloon.  Patient prepped with antiseptic swabs and a 16 [place] ALL SILICONE catheter was placed without difficulty.  8 mls of water used to fill the anchoring balloon.  Catheter secured to leg.  Patient instructed on catheter care and tolerated procedure without complication.  Pt denied any additional questions or concerns.  Pt aware to schedule next catheter change.

## 2022-11-19 NOTE — SUBJECTIVE & OBJECTIVE
Interval History: No acute events overnight. Reports scrotal pain with dressing change. Glucoses low.    Review of Systems   Constitutional:  Negative for chills and fever.   Respiratory:  Negative for cough and shortness of breath.    Cardiovascular:  Negative for chest pain and palpitations.   Gastrointestinal:  Negative for abdominal pain, nausea and vomiting.   Genitourinary:  Positive for scrotal swelling.   Objective:     Vital Signs (Most Recent):  Temp: 98.7 °F (37.1 °C) (11/18/22 1501)  Pulse: 100 (11/18/22 1801)  Resp: 18 (11/18/22 1909)  BP: 133/89 (11/18/22 1801)  SpO2: (!) 92 % (11/18/22 1801)   Vital Signs (24h Range):  Temp:  [98 °F (36.7 °C)-98.7 °F (37.1 °C)] 98.7 °F (37.1 °C)  Pulse:  [] 100  Resp:  [12-43] 18  SpO2:  [92 %-99 %] 92 %  BP: (109-156)/(56-98) 133/89     Weight: 114.1 kg (251 lb 8.7 oz)  Body mass index is 35.08 kg/m².    Intake/Output Summary (Last 24 hours) at 11/18/2022 1916  Last data filed at 11/18/2022 1817  Gross per 24 hour   Intake 1603.47 ml   Output 5075 ml   Net -3471.53 ml        Physical Exam  Vitals and nursing note reviewed.   Constitutional:       General: He is not in acute distress.     Appearance: He is well-developed. He is obese.   HENT:      Head: Normocephalic and atraumatic.   Eyes:      General:         Right eye: No discharge.         Left eye: No discharge.      Conjunctiva/sclera: Conjunctivae normal.   Cardiovascular:      Rate and Rhythm: Normal rate.      Pulses: Normal pulses.   Pulmonary:      Effort: Pulmonary effort is normal. No respiratory distress.   Abdominal:      Palpations: Abdomen is soft.      Tenderness: There is no abdominal tenderness.   Musculoskeletal:         General: Normal range of motion.      Right lower leg: No edema.      Left lower leg: No edema.   Skin:     General: Skin is warm and dry.   Neurological:      Mental Status: He is alert and oriented to person, place, and time.     Significant Labs:   CBC:  Recent Labs    Lab 11/16/22  0549 11/17/22 0357 11/18/22 0351   WBC 24.13* 17.20* 7.90   HGB 14.1 13.8* 14.1   HCT 42.3 42.0 43.5    170 170   GRAN 73.0 87.4*  15.0* 74.8*  5.9   LYMPH 6.0*  CANCELED 5.6*  1.0 13.9*  1.1   MONO 6.0  CANCELED 5.3  0.9 7.6  0.6   EOS CANCELED 0.1 0.1   BASO CANCELED 0.04 0.03     CMP:  Recent Labs   Lab 11/16/22  0549 11/17/22 0357 11/18/22 0351   * 137 139   K 5.1 4.5 4.3   CL 99 104 105   CO2 26 26 28   BUN 33* 31* 19   CREATININE 1.2 0.9 0.8   * 102 109   CALCIUM 8.4* 7.7* 7.6*   MG 2.2 2.7* 2.2   PHOS 5.2* 3.3 3.3   ALKPHOS 89 97 104   AST 82* 40 32   ALT 87* 69* 57*   BILITOT 0.5 0.3 0.3   PROT 5.3* 4.6* 4.6*   ALBUMIN 2.0* 2.0* 1.9*   ANIONGAP 8 7* 6*        Significant Imaging:   No new imaging this morning.

## 2022-11-19 NOTE — ASSESSMENT & PLAN NOTE
- HgbA1c of 7.6  - Continue cardiac/diabetic diet.  - Glucoses low; decrease insulin detemir to 18U subQ BID, continue moderate-dose sliding scale insulin aspart 1-10U subQ TIDWM PRN.  - Will adjust insulin regimen as needed to achieve glucose between 140 - 180

## 2022-11-19 NOTE — PROGRESS NOTES
[facility] ([place])  Urology  Progress Note    Patient Name: Nithin Wagner  MRN: [id number]  Admission Date: 11/15/2022  Hospital Length of Stay: 4 days  Code Status: Full Code   Attending Provider: Darby Obrien MD   Primary Care Physician: Tushar Drew MD    Subjective:     HPI:  67M h/o DM2, CAD, HTN, HLD presents with scrotal pain and swelling. Started about 4 weeks ago and has been progressively worsening despite a couple courses of antibiotics (unsure which ones). He reports fevers and chills at home. He also reports nausea, decreased PO intake, multiple syncopal episodes, and stool incontinence. He is on continuous blood glucose monitoring and states blood sugars have been in 190s today. He was seen yesterday in urgent care and diagnosed with scrotal abscess with possible kami's; he referred to the ER but waited until today to come in.    He was hypotensive and tachycardic on arrival in the ED. Sepsis protocol was started. Labs and imaging are currently pending.         Interval History: NAEON    Review of Systems  Objective:     Temp:  [97.4 °F (36.3 °C)-98.7 °F (37.1 °C)] 97.5 °F (36.4 °C)  Pulse:  [] 96  Resp:  [13-30] 18  SpO2:  [92 %-99 %] 95 %  BP: (113-146)/(66-93) 135/82     Body mass index is 35.08 kg/m².           Drains       Drain  Duration                  Urethral Catheter 11/15/22 2145 Non-latex 16 Fr. 3 days                    Physical Exam      Ao*4  resp normal rate' breathing not labored  cv normal rate  Ab nondistended  Ext no edema    Dressing changed with kerlex and Vasche and ABD pad and mesh underwear  Good granulation tissue  Approx 2*5cm area of dry necrotic skin edge anterior and medial  No cellutlitis  No crepitance  No fluctulance  No pus    Significant Labs:    BMP:  Recent Labs   Lab 11/16/22  0549 11/17/22  0357 11/18/22  0351   * 137 139   K 5.1 4.5 4.3   CL 99 104 105   CO2 26 26 28   BUN 33* 31* 19   CREATININE 1.2 0.9 0.8   CALCIUM 8.4* 7.7* 7.6*        CBC:   Recent Labs   Lab 11/16/22  0549 11/17/22  0357 11/18/22  0351   WBC 24.13* 17.20* 7.90   HGB 14.1 13.8* 14.1   HCT 42.3 42.0 43.5    170 170       Blood Culture:   Recent Labs   Lab 11/15/22  1741 11/15/22  1742   LABBLOO No Growth to date  No Growth to date  No Growth to date  No Growth to date No Growth to date  No Growth to date  No Growth to date  No Growth to date       Significant Imaging:  -                    Assessment/Plan:     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 S/p excision and debridement of scrotum 11/15/22 and 11/17/22.    - Admitted to hospital medicine, appreciate assistance  - Second debridement 11/17 in OR. Clean wound edges  - Diet, insulin and pain control per primary    - Continue broad spectrum antibiotics  - Follow up cultures, NGTD  - Dressing changes BID; will ask nursing staff to change dressing with wound care is not available; spoke with Mr Wagner and he agrees to this plan  - Rest of care per primary     follow up wound care recs    Addendum  Wounds Cs aerococcus  Discuss with Dr Obrien; she will have ID see pt on Monday  Will cont vanc and cefipime and clindamycin; will defer management to hosp med and ID        VTE Risk Mitigation (From admission, onward)           Ordered     IP VTE HIGH RISK PATIENT  Once         11/15/22 1948     Reason for No Pharmacological VTE Prophylaxis  Once        Question:  Reasons:  Answer:  Risk of Bleeding    11/15/22 1948     Place sequential compression device  Until discontinued         11/15/22 1936                    Herminio Johnson MD  Urology  [facility] ([place])

## 2022-11-19 NOTE — PROGRESS NOTES
Pharmacokinetic Assessment Follow Up: IV Vancomycin    Vancomycin serum concentration assessment(s):    The trough level was drawn correctly and can be used to guide therapy at this time. The measurement is within the desired definitive target range of 10 to 20 mcg/mL.    Vancomycin Regimen Plan:    Continue regimen to Vancomycin 1750 mg IV every 12 hours with next serum trough concentration measured at 0900 prior to the dose on 11/20/22    Drug levels (last 3 results):  Recent Labs   Lab Result Units 11/16/22  1639 11/17/22  0357 11/18/22  2043   Vancomycin, Random ug/mL 5.7 12.0  --    Vancomycin-Trough ug/mL  --   --  17.8       Pharmacy will continue to follow and monitor vancomycin.    Please contact pharmacy at extension [phone] for questions regarding this assessment.    Thank you for the consult,   Eli Alanis       Patient brief summary:  Nithin Wagner is a 67 y.o. male initiated on antimicrobial therapy with IV Vancomycin for treatment of skin & soft tissue infection    Drug Allergies:   Review of patient's allergies indicates:   Allergen Reactions    Enbrel [etanercept] Shortness Of Breath     CHF    Nsaids (non-steroidal anti-inflammatory drug) Other (See Comments)     hypertention  Other reaction(s): Other (See Comments)  hypertention  HTN    Statins-hmg-coa reductase inhibitors Other (See Comments)     Heart arrythemias  Other reaction(s): Other (See Comments)  Heart arrythemias  Joint pain and cardiac arrythmias    Pcn [penicillins] Rash       Actual Body Weight:   114.1 kg    Renal Function:   Estimated Creatinine Clearance: 111.9 mL/min (based on SCr of 0.8 mg/dL).,     Dialysis Method (if applicable):  N/A    CBC (last 72 hours):  Recent Labs   Lab Result Units 11/16/22  0549 11/17/22  0357 11/18/22  0351   WBC K/uL 24.13* 17.20* 7.90   Hemoglobin g/dL 14.1 13.8* 14.1   Hematocrit % 42.3 42.0 43.5   Platelets K/uL 190 170 170   Gran % % 73.0 87.4* 74.8*   Lymph % % 6.0* 5.6* 13.9*   Mono % % 6.0  5.3 7.6   Eosinophil % % 0.0 0.3 1.5   Basophil % % 0.0 0.2 0.4   Differential Method  Manual Automated Automated       Metabolic Panel (last 72 hours):  Recent Labs   Lab Result Units 11/16/22  0549 11/17/22  0357 11/18/22  0351   Sodium mmol/L 133* 137 139   Potassium mmol/L 5.1 4.5 4.3   Chloride mmol/L 99 104 105   CO2 mmol/L 26 26 28   Glucose mg/dL 194* 102 109   BUN mg/dL 33* 31* 19   Creatinine mg/dL 1.2 0.9 0.8   Albumin g/dL 2.0* 2.0* 1.9*   Total Bilirubin mg/dL 0.5 0.3 0.3   Alkaline Phosphatase U/L 89 97 104   AST U/L 82* 40 32   ALT U/L 87* 69* 57*   Magnesium mg/dL 2.2 2.7* 2.2   Phosphorus mg/dL 5.2* 3.3 3.3       Vancomycin Administrations:  vancomycin given in the last 96 hours                     vancomycin (VANCOCIN) 1,750 mg in sodium chloride 0.9% 500 mL IVPB (mg) 1,750 mg New Bag 11/18/22 2312     1,750 mg New Bag  1027    vancomycin 1.75 g in 5 % dextrose 500 mL IVPB (mg) 1,750 mg New Bag 11/17/22 2119     1,750 mg New Bag  1218    vancomycin 1.75 g in 5 % dextrose 500 mL IVPB (mg) 1,750 mg New Bag 11/16/22 1834    vancomycin 2 g in dextrose 5 % 500 mL IVPB ()  Restarted 11/15/22 2029      Restarted  1952      Restarted  1914     2,000 mg New Bag  1830                    Microbiologic Results:  Microbiology Results (last 7 days)       Procedure Component Value Units Date/Time    Aerobic culture [[id number]] Collected: 11/15/22 2139    Order Status: Completed Specimen: Genital from Abscess Updated: 11/18/22 1313     Aerobic Bacterial Culture No growth    Narrative:      Left scrotum for culture    Culture, Anaerobic [[id number]]  (Abnormal) Collected: 11/15/22 2155    Order Status: Completed Specimen: Abscess from Perineum Updated: 11/18/22 1223     Anaerobic Culture ANAEROCOCCUS SPECIES  Many  further identified as  Anaerococcus murdochii      Narrative:      Left scrotum for culture    Blood culture x two cultures. Draw prior to antibiotics. [[id number]] Collected: 11/15/22 7730    Order  Status: Completed Specimen: Blood from Peripheral, Forearm, Left Updated: 11/18/22 0612     Blood Culture, Routine No Growth to date      No Growth to date      No Growth to date    Narrative:      Aerobic and anaerobic    Blood culture x two cultures. Draw prior to antibiotics. [[id number]] Collected: 11/15/22 1741    Order Status: Completed Specimen: Blood from Antecubital, Right Updated: 11/18/22 0612     Blood Culture, Routine No Growth to date      No Growth to date      No Growth to date    Narrative:      Aerobic and anaerobic    Influenza A & B by Molecular [[id number]]     Order Status: Canceled Specimen: Nasopharyngeal Swab

## 2022-11-19 NOTE — NURSING
pt has orders for wound care to be done. I explained to pt that I needed to change the dressing to his scrotum and he stated that his wound care is to only be done by a wound care nurse. I spoke with the charge nurse to tell her why the dressing change was not done and she said that's fine he can refuse however wound care is not here on the weekends. So I went on to explain to the patient that wound care doesn't come on the weekends. He stated that before he left ICU on yesterday the wound care nurse told him that she would be back on today but now he doesn't want to rider with having it not being changed. He wanted me to reach out to his care team to get the ok from them before doing his dressing change.

## 2022-11-19 NOTE — NURSING
Patient arrived on unit via bed with patient transport, belongings at bedside. Patient said, his phone got lost in ICU. Patient is AAOX4 and is on 2L O2 NC. Bed wheels locked, side rails up X2 and call light in reach. Will continue with assessment.

## 2022-11-19 NOTE — ASSESSMENT & PLAN NOTE
Mary's Gangrene of Scrotum  Leukocytosis  Lactic acidosis  Elevated troponin  - Patient criteria for sepsis with infective focus of Mary gangrene of the scrotum  - Lactic acid level 4.4, procalcitonin >48, WBC 24, and elevated troponin levels that trended down  - Urology consulted and patient underwent excision and debridement of necrotic scrotal tissue on 11/15; repeat debridement 11/17.  - Continue cefepime 1g IV q.12, clindamycin 900 mg IV Q 8, metronidazole 500 mg IV Q 8 and vancomycin dosed by pharmacy  - Elevated troponins likely secondary to sepsis and setting of DONNA given no reported chest pain and no acute EKG changes  - Blood cultures no growth to date and wound cultures in process.  - Wound care ordered and Urology following  - Clinically improving with resolution of lactic acidosis and definitive debridement  - Appreciate urology assistance.

## 2022-11-19 NOTE — SUBJECTIVE & OBJECTIVE
Interval History: NAEON    Review of Systems  Objective:     Temp:  [97.4 °F (36.3 °C)-98.7 °F (37.1 °C)] 97.5 °F (36.4 °C)  Pulse:  [] 96  Resp:  [13-30] 18  SpO2:  [92 %-99 %] 95 %  BP: (113-146)/(66-93) 135/82     Body mass index is 35.08 kg/m².           Drains       Drain  Duration                  Urethral Catheter 11/15/22 2145 Non-latex 16 Fr. 3 days                    Physical Exam      Ao*4  resp normal rate' breathing not labored  cv normal rate  Ab nondistended  Ext no edema    Dressing changed with kerlex and Vasche and ABD pad and mesh underwear  Good granulation tissue  Approx 2*5cm area of dry necrotic skin edge anterior and medial  No cellutlitis  No crepitance  No fluctulance  No pus    Significant Labs:    BMP:  Recent Labs   Lab 11/16/22  0549 11/17/22 0357 11/18/22  0351   * 137 139   K 5.1 4.5 4.3   CL 99 104 105   CO2 26 26 28   BUN 33* 31* 19   CREATININE 1.2 0.9 0.8   CALCIUM 8.4* 7.7* 7.6*       CBC:   Recent Labs   Lab 11/16/22  0549 11/17/22  0357 11/18/22  0351   WBC 24.13* 17.20* 7.90   HGB 14.1 13.8* 14.1   HCT 42.3 42.0 43.5    170 170       Blood Culture:   Recent Labs   Lab 11/15/22  1741 11/15/22  1742   LABBLOO No Growth to date  No Growth to date  No Growth to date  No Growth to date No Growth to date  No Growth to date  No Growth to date  No Growth to date       Significant Imaging:  -

## 2022-11-19 NOTE — ASSESSMENT & PLAN NOTE
66 yo male who presented with Mary's. S/p excision and debridement of scrotum 11/15/22 and 11/17/22.    - Admitted to hospital medicine, appreciate assistance  - Second debridement 11/17 in OR. Clean wound edges  - Diet, insulin and pain control per primary    - Continue broad spectrum antibiotics  - Follow up cultures, NGTD  - Dressing changes BID; will ask nursing staff to change dressing with wound care is not available; spoke with Mr Wagner and he agrees to this plan  - Rest of care per primary     follow up wound care recs    Addendum  Wounds Cs aerococcus  Discuss with Dr Obrien; she will have ID see pt on Monday  Will cont vanc and cefipime; will defer management to hosp med and ID

## 2022-11-20 LAB
ALBUMIN SERPL BCP-MCNC: 2 G/DL (ref 3.5–5.2)
ALP SERPL-CCNC: 111 U/L (ref 55–135)
ALT SERPL W/O P-5'-P-CCNC: 47 U/L (ref 10–44)
ANION GAP SERPL CALC-SCNC: 8 MMOL/L (ref 8–16)
AST SERPL-CCNC: 33 U/L (ref 10–40)
BASOPHILS # BLD AUTO: 0.04 K/UL (ref 0–0.2)
BASOPHILS NFR BLD: 0.4 % (ref 0–1.9)
BILIRUB SERPL-MCNC: 0.5 MG/DL (ref 0.1–1)
BUN SERPL-MCNC: 7 MG/DL (ref 8–23)
CALCIUM SERPL-MCNC: 8.1 MG/DL (ref 8.7–10.5)
CHLORIDE SERPL-SCNC: 104 MMOL/L (ref 95–110)
CO2 SERPL-SCNC: 28 MMOL/L (ref 23–29)
CREAT SERPL-MCNC: 0.7 MG/DL (ref 0.5–1.4)
DIFFERENTIAL METHOD: ABNORMAL
EOSINOPHIL # BLD AUTO: 0.1 K/UL (ref 0–0.5)
EOSINOPHIL NFR BLD: 1.1 % (ref 0–8)
ERYTHROCYTE [DISTWIDTH] IN BLOOD BY AUTOMATED COUNT: 13.2 % (ref 11.5–14.5)
EST. GFR  (NO RACE VARIABLE): >60 ML/MIN/1.73 M^2
GLUCOSE SERPL-MCNC: 123 MG/DL (ref 70–110)
HCT VFR BLD AUTO: 44.3 % (ref 40–54)
HGB BLD-MCNC: 14.3 G/DL (ref 14–18)
IMM GRANULOCYTES # BLD AUTO: 0.38 K/UL (ref 0–0.04)
IMM GRANULOCYTES NFR BLD AUTO: 3.9 % (ref 0–0.5)
LYMPHOCYTES # BLD AUTO: 1 K/UL (ref 1–4.8)
LYMPHOCYTES NFR BLD: 10.6 % (ref 18–48)
MAGNESIUM SERPL-MCNC: 1.8 MG/DL (ref 1.6–2.6)
MCH RBC QN AUTO: 33.5 PG (ref 27–31)
MCHC RBC AUTO-ENTMCNC: 32.3 G/DL (ref 32–36)
MCV RBC AUTO: 104 FL (ref 82–98)
MONOCYTES # BLD AUTO: 0.8 K/UL (ref 0.3–1)
MONOCYTES NFR BLD: 7.8 % (ref 4–15)
NEUTROPHILS # BLD AUTO: 7.4 K/UL (ref 1.8–7.7)
NEUTROPHILS NFR BLD: 76.2 % (ref 38–73)
NRBC BLD-RTO: 0 /100 WBC
PHOSPHATE SERPL-MCNC: 3.1 MG/DL (ref 2.7–4.5)
PLATELET # BLD AUTO: 196 K/UL (ref 150–450)
PMV BLD AUTO: 11.3 FL (ref 9.2–12.9)
POCT GLUCOSE: 118 MG/DL (ref 70–110)
POCT GLUCOSE: 131 MG/DL (ref 70–110)
POCT GLUCOSE: 134 MG/DL (ref 70–110)
POTASSIUM SERPL-SCNC: 4.4 MMOL/L (ref 3.5–5.1)
PROT SERPL-MCNC: 5.2 G/DL (ref 6–8.4)
RBC # BLD AUTO: 4.27 M/UL (ref 4.6–6.2)
SODIUM SERPL-SCNC: 140 MMOL/L (ref 136–145)
VANCOMYCIN TROUGH SERPL-MCNC: 14.9 UG/ML (ref 10–22)
WBC # BLD AUTO: 9.71 K/UL (ref 3.9–12.7)

## 2022-11-20 PROCEDURE — S0030 INJECTION, METRONIDAZOLE: HCPCS | Performed by: UROLOGY

## 2022-11-20 PROCEDURE — 25000003 PHARM REV CODE 250: Performed by: UROLOGY

## 2022-11-20 PROCEDURE — 25000003 PHARM REV CODE 250: Performed by: INTERNAL MEDICINE

## 2022-11-20 PROCEDURE — 99233 SBSQ HOSP IP/OBS HIGH 50: CPT | Mod: ,,, | Performed by: HOSPITALIST

## 2022-11-20 PROCEDURE — 85025 COMPLETE CBC W/AUTO DIFF WBC: CPT | Performed by: HOSPITALIST

## 2022-11-20 PROCEDURE — 80053 COMPREHEN METABOLIC PANEL: CPT | Performed by: HOSPITALIST

## 2022-11-20 PROCEDURE — 63600175 PHARM REV CODE 636 W HCPCS: Performed by: HOSPITALIST

## 2022-11-20 PROCEDURE — 99232 SBSQ HOSP IP/OBS MODERATE 35: CPT | Mod: ,,, | Performed by: UROLOGY

## 2022-11-20 PROCEDURE — 63600175 PHARM REV CODE 636 W HCPCS: Performed by: NURSE PRACTITIONER

## 2022-11-20 PROCEDURE — 99233 PR SUBSEQUENT HOSPITAL CARE,LEVL III: ICD-10-PCS | Mod: ,,, | Performed by: HOSPITALIST

## 2022-11-20 PROCEDURE — 94761 N-INVAS EAR/PLS OXIMETRY MLT: CPT

## 2022-11-20 PROCEDURE — 25000003 PHARM REV CODE 250: Performed by: HOSPITALIST

## 2022-11-20 PROCEDURE — 63600175 PHARM REV CODE 636 W HCPCS: Performed by: INTERNAL MEDICINE

## 2022-11-20 PROCEDURE — 27000221 HC OXYGEN, UP TO 24 HOURS

## 2022-11-20 PROCEDURE — 36415 COLL VENOUS BLD VENIPUNCTURE: CPT | Performed by: INTERNAL MEDICINE

## 2022-11-20 PROCEDURE — 84100 ASSAY OF PHOSPHORUS: CPT | Performed by: HOSPITALIST

## 2022-11-20 PROCEDURE — 80202 ASSAY OF VANCOMYCIN: CPT | Performed by: INTERNAL MEDICINE

## 2022-11-20 PROCEDURE — 99232 PR SUBSEQUENT HOSPITAL CARE,LEVL II: ICD-10-PCS | Mod: ,,, | Performed by: UROLOGY

## 2022-11-20 PROCEDURE — 36415 COLL VENOUS BLD VENIPUNCTURE: CPT | Performed by: HOSPITALIST

## 2022-11-20 PROCEDURE — 83735 ASSAY OF MAGNESIUM: CPT | Performed by: HOSPITALIST

## 2022-11-20 PROCEDURE — 11000001 HC ACUTE MED/SURG PRIVATE ROOM

## 2022-11-20 PROCEDURE — 99900035 HC TECH TIME PER 15 MIN (STAT)

## 2022-11-20 RX ORDER — OXYCODONE HYDROCHLORIDE 5 MG/1
15 TABLET ORAL
Status: DISCONTINUED | OUTPATIENT
Start: 2022-11-20 | End: 2022-12-01 | Stop reason: HOSPADM

## 2022-11-20 RX ORDER — NYSTATIN 100000 [USP'U]/ML
5 SUSPENSION ORAL
Status: DISCONTINUED | OUTPATIENT
Start: 2022-11-20 | End: 2022-12-01 | Stop reason: HOSPADM

## 2022-11-20 RX ADMIN — FOLIC ACID 1 MG: 1 TABLET ORAL at 08:11

## 2022-11-20 RX ADMIN — OXYCODONE 15 MG: 5 TABLET ORAL at 07:11

## 2022-11-20 RX ADMIN — MORPHINE SULFATE 6 MG: 2 INJECTION, SOLUTION INTRAMUSCULAR; INTRAVENOUS at 05:11

## 2022-11-20 RX ADMIN — DIGOXIN 125 MCG: 125 TABLET ORAL at 08:11

## 2022-11-20 RX ADMIN — EZETIMIBE 10 MG: 10 TABLET ORAL at 08:11

## 2022-11-20 RX ADMIN — CEFEPIME HYDROCHLORIDE 1 G: 1 INJECTION, SOLUTION INTRAVENOUS at 08:11

## 2022-11-20 RX ADMIN — OXYCODONE 15 MG: 5 TABLET ORAL at 12:11

## 2022-11-20 RX ADMIN — SODIUM CHLORIDE: 0.9 INJECTION, SOLUTION INTRAVENOUS at 02:11

## 2022-11-20 RX ADMIN — OXYCODONE HYDROCHLORIDE AND ACETAMINOPHEN 1 TABLET: 5; 325 TABLET ORAL at 03:11

## 2022-11-20 RX ADMIN — MORPHINE SULFATE 6 MG: 2 INJECTION, SOLUTION INTRAMUSCULAR; INTRAVENOUS at 10:11

## 2022-11-20 RX ADMIN — THERA TABS 1 TABLET: TAB at 08:11

## 2022-11-20 RX ADMIN — CLINDAMYCIN PHOSPHATE 900 MG: 900 INJECTION, SOLUTION INTRAVENOUS at 02:11

## 2022-11-20 RX ADMIN — METRONIDAZOLE 500 MG: 500 INJECTION, SOLUTION INTRAVENOUS at 05:11

## 2022-11-20 RX ADMIN — INSULIN DETEMIR 18 UNITS: 100 INJECTION, SOLUTION SUBCUTANEOUS at 08:11

## 2022-11-20 RX ADMIN — CEFEPIME HYDROCHLORIDE 1 G: 1 INJECTION, SOLUTION INTRAVENOUS at 01:11

## 2022-11-20 RX ADMIN — MUPIROCIN: 20 OINTMENT TOPICAL at 08:11

## 2022-11-20 RX ADMIN — CLINDAMYCIN PHOSPHATE 900 MG: 900 INJECTION, SOLUTION INTRAVENOUS at 09:11

## 2022-11-20 RX ADMIN — ASPIRIN 81 MG CHEWABLE TABLET 81 MG: 81 TABLET CHEWABLE at 08:11

## 2022-11-20 RX ADMIN — METRONIDAZOLE 500 MG: 500 INJECTION, SOLUTION INTRAVENOUS at 09:11

## 2022-11-20 RX ADMIN — OXYCODONE HYDROCHLORIDE AND ACETAMINOPHEN 1 TABLET: 5; 325 TABLET ORAL at 08:11

## 2022-11-20 RX ADMIN — OXYCODONE HYDROCHLORIDE AND ACETAMINOPHEN 1 TABLET: 5; 325 TABLET ORAL at 11:11

## 2022-11-20 RX ADMIN — NYSTATIN 500000 UNITS: 500000 SUSPENSION ORAL at 09:11

## 2022-11-20 RX ADMIN — CLINDAMYCIN PHOSPHATE 900 MG: 900 INJECTION, SOLUTION INTRAVENOUS at 06:11

## 2022-11-20 RX ADMIN — ONDANSETRON 4 MG: 2 INJECTION INTRAMUSCULAR; INTRAVENOUS at 10:11

## 2022-11-20 RX ADMIN — OXYCODONE HYDROCHLORIDE AND ACETAMINOPHEN 1 TABLET: 5; 325 TABLET ORAL at 04:11

## 2022-11-20 RX ADMIN — CEFEPIME HYDROCHLORIDE 1 G: 1 INJECTION, SOLUTION INTRAVENOUS at 04:11

## 2022-11-20 RX ADMIN — VANCOMYCIN HYDROCHLORIDE 1750 MG: 750 INJECTION, POWDER, LYOPHILIZED, FOR SOLUTION INTRAVENOUS at 11:11

## 2022-11-20 RX ADMIN — MORPHINE SULFATE 6 MG: 2 INJECTION, SOLUTION INTRAMUSCULAR; INTRAVENOUS at 01:11

## 2022-11-20 RX ADMIN — METRONIDAZOLE 500 MG: 500 INJECTION, SOLUTION INTRAVENOUS at 02:11

## 2022-11-20 NOTE — ASSESSMENT & PLAN NOTE
CAD  - Last echo with normal EF and grade 1 diastolic dysfunction on 10/28/2022  - No overt signs of volume overload and no reported chest pain  - Elevated troponin due to sepsis with associated hypotension along with DONNA and CHF which were trended down and no ectopy noted on telemetry, and no acute EKG changes  - Continue digoxin 125 mcg p.o. daily, ASA 81 mg p.o. daily and ezetimibe 10 mg p.o. daily  - Continue to hold Lasix and hydrochlorothiazide due to sepsis, will resume medications when clinically indicated

## 2022-11-20 NOTE — ASSESSMENT & PLAN NOTE
- HgbA1c of 7.6  - Continue cardiac/diabetic diet.  - Glucoses levels reviewed, are at targets  - Continue detemir 18U subQ daily, and moderate-dose sliding scale insulin aspart 1-10U subQ TIDWM PRN.  - Will adjust insulin regimen as needed to achieve glucose between 140 - 180

## 2022-11-20 NOTE — PROGRESS NOTES
Pharmacokinetic Assessment Follow Up: IV Vancomycin    Vancomycin serum concentration assessment(s):    The trough level was drawn correctly and can be used to guide therapy at this time. The measurement is within the desired definitive target range of 10 to 20 mcg/mL.    Vancomycin Regimen Plan:    Continue regimen to Vancomycin 1750 mg IV every 12 hours with next serum trough concentration measured at 1030 prior to 1100 dose on 11/22    Drug levels (last 3 results):  Recent Labs   Lab Result Units 11/18/22  2043 11/20/22  1015   Vancomycin-Trough ug/mL 17.8 14.9       Pharmacy will continue to follow and monitor vancomycin.    Please contact pharmacy at extension [phone] for questions regarding this assessment.    Thank you for the consult,   Divine Brito       Patient brief summary:  Nithin Wagner is a 67 y.o. male initiated on antimicrobial therapy with IV Vancomycin for treatment of skin & soft tissue infection    The patient's current regimen is Vancomycin 1750mg IVPB every 12 hours    Drug Allergies:   Review of patient's allergies indicates:   Allergen Reactions    Enbrel [etanercept] Shortness Of Breath     CHF    Nsaids (non-steroidal anti-inflammatory drug) Other (See Comments)     hypertention  Other reaction(s): Other (See Comments)  hypertention  HTN    Statins-hmg-coa reductase inhibitors Other (See Comments)     Heart arrythemias  Other reaction(s): Other (See Comments)  Heart arrythemias  Joint pain and cardiac arrythmias    Pcn [penicillins] Rash       Actual Body Weight:   114.1kg    Renal Function:   Estimated Creatinine Clearance: 111.9 mL/min (based on SCr of 0.8 mg/dL).,     Dialysis Method (if applicable):  N/A    CBC (last 72 hours):  Recent Labs   Lab Result Units 11/18/22  0351   WBC K/uL 7.90   Hemoglobin g/dL 14.1   Hematocrit % 43.5   Platelets K/uL 170   Gran % % 74.8*   Lymph % % 13.9*   Mono % % 7.6   Eosinophil % % 1.5   Basophil % % 0.4   Differential Method  Automated        Metabolic Panel (last 72 hours):  Recent Labs   Lab Result Units 11/18/22  0351   Sodium mmol/L 139   Potassium mmol/L 4.3   Chloride mmol/L 105   CO2 mmol/L 28   Glucose mg/dL 109   BUN mg/dL 19   Creatinine mg/dL 0.8   Albumin g/dL 1.9*   Total Bilirubin mg/dL 0.3   Alkaline Phosphatase U/L 104   AST U/L 32   ALT U/L 57*   Magnesium mg/dL 2.2   Phosphorus mg/dL 3.3       Vancomycin Administrations:  vancomycin given in the last 96 hours                     vancomycin (VANCOCIN) 1,750 mg in sodium chloride 0.9% 500 mL IVPB (mg) 1,750 mg New Bag 11/20/22 1108     1,750 mg New Bag 11/19/22 2145     1,750 mg New Bag  1042     1,750 mg New Bag 11/18/22 2312     1,750 mg New Bag  1027    vancomycin 1.75 g in 5 % dextrose 500 mL IVPB (mg) 1,750 mg New Bag 11/17/22 2119     1,750 mg New Bag  1218    vancomycin 1.75 g in 5 % dextrose 500 mL IVPB (mg) 1,750 mg New Bag 11/16/22 1834                    Microbiologic Results:  Microbiology Results (last 7 days)       Procedure Component Value Units Date/Time    Blood culture x two cultures. Draw prior to antibiotics. [[id number]] Collected: 11/15/22 1741    Order Status: Completed Specimen: Blood from Antecubital, Right Updated: 11/20/22 0612     Blood Culture, Routine No Growth to date      No Growth to date      No Growth to date      No Growth to date      No Growth to date    Narrative:      Aerobic and anaerobic    Blood culture x two cultures. Draw prior to antibiotics. [[id number]] Collected: 11/15/22 1742    Order Status: Completed Specimen: Blood from Peripheral, Forearm, Left Updated: 11/20/22 0612     Blood Culture, Routine No Growth to date      No Growth to date      No Growth to date      No Growth to date      No Growth to date    Narrative:      Aerobic and anaerobic    Aerobic culture [[id number]] Collected: 11/15/22 2139    Order Status: Completed Specimen: Genital from Abscess Updated: 11/19/22 1028     Aerobic Bacterial Culture No growth    Narrative:       Left scrotum for culture    Culture, Anaerobic [[id number]]  (Abnormal) Collected: 11/15/22 5278    Order Status: Completed Specimen: Abscess from Perineum Updated: 11/18/22 1223     Anaerobic Culture ANAEROCOCCUS SPECIES  Many  further identified as  Anaerococcus murdochii      Narrative:      Left scrotum for culture    Influenza A & B by Molecular [[id number]]     Order Status: Canceled Specimen: Nasopharyngeal Swab

## 2022-11-20 NOTE — ASSESSMENT & PLAN NOTE
Mary's Gangrene of Scrotum  Leukocytosis  Lactic acidosis  Elevated troponin  - Patient criteria for sepsis with infective focus of Mary gangrene of the scrotum  - Lactic acid level 4.4, procalcitonin >48, WBC 24, and elevated troponin levels that trended down  - Urology consulted and patient underwent excision and debridement of necrotic scrotal tissue on 11/15; repeat debridement 11/17.  - Continue cefepime 1g IV q.12, clindamycin 900 mg IV Q 8, metronidazole 500 mg IV Q 8 and vancomycin dosed by pharmacy  - Elevated troponins likely secondary to sepsis and setting of DONNA given no reported chest pain and no acute EKG changes  - Blood cultures no growth to date and wound cultures with Anaerococcus species  - Wound care ordered and Urology following  - Clinically improving with resolution of lactic acidosis and definitive debridement  - Appreciate urology assistance.  - Adjust pain medication regimen with addition of medication for breakthrough pain  - Will consult ID consult for tomorrow

## 2022-11-20 NOTE — SUBJECTIVE & OBJECTIVE
Interval History:   NAEON    Pain controlled  No complaints    Review of Systems  Objective:     Temp:  [97.3 °F (36.3 °C)-99.1 °F (37.3 °C)] 97.8 °F (36.6 °C)  Pulse:  [] 88  Resp:  [16-22] 16  SpO2:  [93 %-98 %] 96 %  BP: (124-162)/(79-90) 124/79     Body mass index is 35.08 kg/m².           Drains       Drain  Duration                  Urethral Catheter 11/15/22 2145 Non-latex 16 Fr. 4 days                    Physical Exam    Ao*4  resp normal rate' breathing not labored  cv normal rate  Ab nondistended  Ext no edema  Skin edges stable; dark area on medial aspect of wound has not progressed  Good granulation tissue  No pus  No crepitance  No fluctulance    Significant Labs:    BMP:  Recent Labs   Lab 11/16/22  0549 11/17/22  0357 11/18/22  0351   * 137 139   K 5.1 4.5 4.3   CL 99 104 105   CO2 26 26 28   BUN 33* 31* 19   CREATININE 1.2 0.9 0.8   CALCIUM 8.4* 7.7* 7.6*       CBC:   Recent Labs   Lab 11/16/22  0549 11/17/22  0357 11/18/22  0351   WBC 24.13* 17.20* 7.90   HGB 14.1 13.8* 14.1   HCT 42.3 42.0 43.5    170 170       Blood Culture:   Recent Labs   Lab 11/15/22  1741 11/15/22  1742   LABBLOO No Growth to date  No Growth to date  No Growth to date  No Growth to date  No Growth to date No Growth to date  No Growth to date  No Growth to date  No Growth to date  No Growth to date     Urine Culture: No results for input(s): LABURIN in the last 168 hours.    Significant Imaging:  -

## 2022-11-20 NOTE — SUBJECTIVE & OBJECTIVE
Interval History: No acute event, reports pain with dressing changes that are not controlled and still with episodes of pain not controlled even when dressing change not done.    Review of Systems   Constitutional:  Negative for chills and fever.   Respiratory:  Negative for cough and shortness of breath.    Cardiovascular:  Negative for chest pain and palpitations.   Genitourinary:  Positive for scrotal swelling.   Objective:     Vital Signs (Most Recent):  Temp: 99.1 °F (37.3 °C) (11/20/22 0804)  Pulse: (!) 111 (11/20/22 0804)  Resp: 20 (11/20/22 1007)  BP: 127/86 (11/20/22 0804)  SpO2: 96 % (11/20/22 0804)   Vital Signs (24h Range):  Temp:  [97.3 °F (36.3 °C)-99.1 °F (37.3 °C)] 99.1 °F (37.3 °C)  Pulse:  [] 111  Resp:  [16-22] 20  SpO2:  [93 %-98 %] 96 %  BP: (127-162)/(84-95) 127/86     Weight: 114.1 kg (251 lb 8.7 oz)  Body mass index is 35.08 kg/m².    Intake/Output Summary (Last 24 hours) at 11/20/2022 1105  Last data filed at 11/20/2022 0500  Gross per 24 hour   Intake --   Output 2550 ml   Net -2550 ml        Physical Exam  Constitutional:       General: He is sleeping.      Appearance: He is well-developed. He is obese.   HENT:      Head: Normocephalic and atraumatic.      Nose: Nose normal.   Eyes:      Extraocular Movements: Extraocular movements intact.      Conjunctiva/sclera: Conjunctivae normal.   Cardiovascular:      Rate and Rhythm: Normal rate and regular rhythm.      Pulses:           Radial pulses are 2+ on the right side and 2+ on the left side.      Heart sounds: Normal heart sounds.   Pulmonary:      Effort: Pulmonary effort is normal.      Breath sounds: Normal breath sounds.   Abdominal:      General: Bowel sounds are normal. There is no distension.      Palpations: Abdomen is soft.      Tenderness: There is no abdominal tenderness. There is no guarding or rebound.   Musculoskeletal:         General: Normal range of motion.      Cervical back: Normal range of motion and neck supple.    Skin:     General: Skin is warm and dry.      Comments: Packing noted to left scrotum   Neurological:      Mental Status: He is oriented to person, place, and time.      Motor: Motor function is intact.   Psychiatric:         Mood and Affect: Mood normal.         Behavior: Behavior normal.         Thought Content: Thought content normal.       Significant Labs: All pertinent labs within the past 24 hours have been reviewed.    Significant Imaging: I have reviewed all pertinent imaging results/findings within the past 24 hours.

## 2022-11-20 NOTE — PROGRESS NOTES
[facility] ([place])  Urology  Progress Note    Patient Name: Nithin Wagner  MRN: [id number]  Admission Date: 11/15/2022  Hospital Length of Stay: 5 days  Code Status: Full Code   Attending Provider: Darby Obrien MD   Primary Care Physician: Tushar Drew MD    Subjective:     HPI:  67M h/o DM2, CAD, HTN, HLD presents with scrotal pain and swelling. Started about 4 weeks ago and has been progressively worsening despite a couple courses of antibiotics (unsure which ones). He reports fevers and chills at home. He also reports nausea, decreased PO intake, multiple syncopal episodes, and stool incontinence. He is on continuous blood glucose monitoring and states blood sugars have been in 190s today. He was seen yesterday in urgent care and diagnosed with scrotal abscess with possible kami's; he referred to the ER but waited until today to come in.    He was hypotensive and tachycardic on arrival in the ED. Sepsis protocol was started. Labs and imaging are currently pending.         Interval History:   NAEON    Pain controlled  No complaints    Review of Systems  Objective:     Temp:  [97.3 °F (36.3 °C)-99.1 °F (37.3 °C)] 97.8 °F (36.6 °C)  Pulse:  [] 88  Resp:  [16-22] 16  SpO2:  [93 %-98 %] 96 %  BP: (124-162)/(79-90) 124/79     Body mass index is 35.08 kg/m².           Drains       Drain  Duration                  Urethral Catheter 11/15/22 2145 Non-latex 16 Fr. 4 days                    Physical Exam    Ao*4  resp normal rate' breathing not labored  cv normal rate  Ab nondistended  Ext no edema  Skin edges stable; dark area on medial aspect of wound has not progressed  Good granulation tissue  No pus  No crepitance  No fluctulance    Significant Labs:    BMP:  Recent Labs   Lab 11/16/22  0549 11/17/22  0357 11/18/22  0351   * 137 139   K 5.1 4.5 4.3   CL 99 104 105   CO2 26 26 28   BUN 33* 31* 19   CREATININE 1.2 0.9 0.8   CALCIUM 8.4* 7.7* 7.6*       CBC:   Recent Labs   Lab 11/16/22  0549  11/17/22  0357 11/18/22  0351   WBC 24.13* 17.20* 7.90   HGB 14.1 13.8* 14.1   HCT 42.3 42.0 43.5    170 170       Blood Culture:   Recent Labs   Lab 11/15/22  1741 11/15/22  1742   LABBLOO No Growth to date  No Growth to date  No Growth to date  No Growth to date  No Growth to date No Growth to date  No Growth to date  No Growth to date  No Growth to date  No Growth to date     Urine Culture: No results for input(s): LABURIN in the last 168 hours.    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 S/p excision and debridement of scrotum 11/15/22 and 11/17/22.    - Admitted to hospital medicine, appreciate assistance  - Second debridement 11/17 in OR. Clean wound edges  - Diet, insulin and pain control per primary    - Continue broad spectrum antibiotics  - Follow up cultures, NGTD  - Dressing changes BID; will ask nursing staff to change dressing with wound care is not available; spoke with Mr Wagner and he agrees to this plan  - Rest of care per primary     follow up wound care recs    Addendum  Wounds Cs aerococcus  Discuss with Dr Obrien; she will have ID see pt on Monday  Will cont vanc and cefipime; will defer management to hosp med and ID    Scrotal edema  -- No evidence of abscess or Mary's gangrene on physical exam  -- CT scan currently pending - will review once complete and update plan accordingly        VTE Risk Mitigation (From admission, onward)         Ordered     IP VTE HIGH RISK PATIENT  Once         11/15/22 1948     Reason for No Pharmacological VTE Prophylaxis  Once        Question:  Reasons:  Answer:  Risk of Bleeding    11/15/22 1948     Place sequential compression device  Until discontinued         11/15/22 1936                Herminio Johnson MD  Urology  [facility] ([place])

## 2022-11-20 NOTE — ASSESSMENT & PLAN NOTE
- Creatinine 2.5, baseline- 1.3; likely due to hypotension/sepsis  - Improved with IV fluids and treatment of sepsis  - Creatinine down normal levels  - Continue monitor with repeat labs and follow I/O's

## 2022-11-20 NOTE — PROGRESS NOTES
[facility] Medicine  Progress Note    Patient Name: Nithin Wagner  MRN: [id number]  Patient Class: IP- Inpatient   Admission Date: 11/15/2022  Length of Stay: 5 days  Attending Physician: Darby Obrien MD  Primary Care Provider: Tushar Drew MD        Subjective:     Principal Problem:Sepsis        HPI:  The patient is a 67M with a past medical history of DM2, CAD, HTN, and HLD who presents with scrotal pain and swelling. Started about 4 weeks ago and has been progressively worsening despite a couple courses of antibiotics (unsure which ones). He reports fevers and chills at home, nausea, decreased PO intake, and multiple syncopal episodes with +incontinence of stool. Unable to urinate for the past 24 hours or so. Sugars 190s at home. He was seen yesterday in urgent care and referred to the ER, but says he wasn't able to make it at that time so he comes today instead. On initial workup, the patient has CT evidence of Fourniers gangrene.  He will be brought to the operating room emergently and admitted after to ICU for further management.      Overview/Hospital Course:  Mr. Wagner presented with scrotal pain and swelling.  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  Empiric initiated with cefepime, clindamycin, metronidazole, and vancomycin.  Urology consulted and he underwent emergent excision and debridement of necrotic tissue on 11/15. Repeat washout performed 11/17. Improved and stepped down from ICU 11/18.      Interval History: No acute event, reports pain with dressing changes that are not controlled and still with episodes of pain not controlled even when dressing change not done.    Review of Systems   Constitutional:  Negative for chills and fever.   Respiratory:  Negative for cough and shortness of breath.    Cardiovascular:  Negative for chest pain and palpitations.   Genitourinary:  Positive for scrotal swelling.   Objective:     Vital Signs (Most Recent):  Temp: 99.1  °F (37.3 °C) (11/20/22 0804)  Pulse: (!) 111 (11/20/22 0804)  Resp: 20 (11/20/22 1007)  BP: 127/86 (11/20/22 0804)  SpO2: 96 % (11/20/22 0804)   Vital Signs (24h Range):  Temp:  [97.3 °F (36.3 °C)-99.1 °F (37.3 °C)] 99.1 °F (37.3 °C)  Pulse:  [] 111  Resp:  [16-22] 20  SpO2:  [93 %-98 %] 96 %  BP: (127-162)/(84-95) 127/86     Weight: 114.1 kg (251 lb 8.7 oz)  Body mass index is 35.08 kg/m².    Intake/Output Summary (Last 24 hours) at 11/20/2022 1105  Last data filed at 11/20/2022 0500  Gross per 24 hour   Intake --   Output 2550 ml   Net -2550 ml        Physical Exam  Constitutional:       General: He is sleeping.      Appearance: He is well-developed. He is obese.   HENT:      Head: Normocephalic and atraumatic.      Nose: Nose normal.   Eyes:      Extraocular Movements: Extraocular movements intact.      Conjunctiva/sclera: Conjunctivae normal.   Cardiovascular:      Rate and Rhythm: Normal rate and regular rhythm.      Pulses:           Radial pulses are 2+ on the right side and 2+ on the left side.      Heart sounds: Normal heart sounds.   Pulmonary:      Effort: Pulmonary effort is normal.      Breath sounds: Normal breath sounds.   Abdominal:      General: Bowel sounds are normal. There is no distension.      Palpations: Abdomen is soft.      Tenderness: There is no abdominal tenderness. There is no guarding or rebound.   Musculoskeletal:         General: Normal range of motion.      Cervical back: Normal range of motion and neck supple.   Skin:     General: Skin is warm and dry.      Comments: Packing noted to left scrotum   Neurological:      Mental Status: He is oriented to person, place, and time.      Motor: Motor function is intact.   Psychiatric:         Mood and Affect: Mood normal.         Behavior: Behavior normal.         Thought Content: Thought content normal.       Significant Labs: All pertinent labs within the past 24 hours have been reviewed.    Significant Imaging: I have reviewed all  pertinent imaging results/findings within the past 24 hours.      Assessment/Plan:      * Sepsis  Mary's Gangrene of Scrotum  Leukocytosis  Lactic acidosis  Elevated troponin  - Patient criteria for sepsis with infective focus of Mary gangrene of the scrotum  - Lactic acid level 4.4, procalcitonin >48, WBC 24, and elevated troponin levels that trended down  - Urology consulted and patient underwent excision and debridement of necrotic scrotal tissue on 11/15; repeat debridement 11/17.  - Continue cefepime 1g IV q.12, clindamycin 900 mg IV Q 8, metronidazole 500 mg IV Q 8 and vancomycin dosed by pharmacy  - Elevated troponins likely secondary to sepsis and setting of DONNA given no reported chest pain and no acute EKG changes  - Blood cultures no growth to date and wound cultures with Anaerococcus species  - Wound care ordered and Urology following  - Clinically improving with resolution of lactic acidosis and definitive debridement  - Appreciate urology assistance.  - Adjust pain medication regimen with addition of medication for breakthrough pain  - Will consult ID consult for tomorrow    DONNA (acute kidney injury)  - Creatinine 2.5, baseline- 1.3; likely due to hypotension/sepsis  - Improved with IV fluids and treatment of sepsis  - Creatinine down to normal levels  - Continue monitor with repeat labs and follow I/O's    Chronic diastolic heart failure  CAD  - Last echo with normal EF and grade 1 diastolic dysfunction on 10/28/2022  - No overt signs of volume overload and no reported chest pain  - Elevated troponin due to sepsis with associated hypotension along with DONNA and CHF which were trended down and no ectopy noted on telemetry, and no acute EKG changes  - Continue digoxin 125 mcg p.o. daily, ASA 81 mg p.o. daily and ezetimibe 10 mg p.o. 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  - Glucoses levels reviewed, are at targets  - Continue detemir 18U subQ daily, and moderate-dose sliding scale insulin aspart 1-10U subQ TIDWM PRN.  - Will adjust insulin regimen as needed to achieve glucose between 140 - 180    Essential hypertension  - Hypotensive was on admit that responded to treatment with IV fluids with no need for pressor support  - Blood pressure predominantly in normal range; monitor.  - Continue to hold antihypertensives for now.      VTE Risk Mitigation (From admission, onward)         Ordered     IP VTE HIGH RISK PATIENT  Once         11/15/22 1948     Reason for No Pharmacological VTE Prophylaxis  Once        Question:  Reasons:  Answer:  Risk of Bleeding    11/15/22 1948     Place sequential compression device  Until discontinued         11/15/22 1936                    Darby Obrien MD  Department of Hospital Medicine   [facility] ([employer])  "

## 2022-11-20 NOTE — SUBJECTIVE & OBJECTIVE
Interval History: No acute event, reports pain with dressing changes that are not controlled, glucose with low level again this morning but patient without symptoms.    Review of Systems   Constitutional:  Negative for chills and fever.   Respiratory:  Negative for cough and shortness of breath.    Cardiovascular:  Negative for chest pain and palpitations.   Genitourinary:  Positive for scrotal swelling.   Objective:     Vital Signs (Most Recent):  Temp: 97.6 °F (36.4 °C) (11/19/22 2034)  Pulse: 88 (11/19/22 2034)  Resp: 18 (11/19/22 2144)  BP: (!) 162/90 (11/19/22 2034)  SpO2: 98 % (11/19/22 2036)   Vital Signs (24h Range):  Temp:  [97.4 °F (36.3 °C)-98.7 °F (37.1 °C)] 97.6 °F (36.4 °C)  Pulse:  [] 88  Resp:  [18-22] 18  SpO2:  [93 %-98 %] 98 %  BP: (128-162)/(74-95) 162/90     Weight: 114.1 kg (251 lb 8.7 oz)  Body mass index is 35.08 kg/m².    Intake/Output Summary (Last 24 hours) at 11/19/2022 2148  Last data filed at 11/19/2022 1724  Gross per 24 hour   Intake 650 ml   Output 2850 ml   Net -2200 ml      Physical Exam  Constitutional:       General: He is sleeping.      Appearance: He is well-developed. He is obese.   HENT:      Head: Normocephalic and atraumatic.      Nose: Nose normal.   Eyes:      Extraocular Movements: Extraocular movements intact.      Conjunctiva/sclera: Conjunctivae normal.   Cardiovascular:      Rate and Rhythm: Normal rate and regular rhythm.      Pulses:           Radial pulses are 2+ on the right side and 2+ on the left side.      Heart sounds: Normal heart sounds.   Pulmonary:      Effort: Pulmonary effort is normal.      Breath sounds: Normal breath sounds.   Abdominal:      General: Bowel sounds are normal. There is no distension.      Palpations: Abdomen is soft.      Tenderness: There is no abdominal tenderness. There is no guarding or rebound.   Musculoskeletal:         General: Normal range of motion.      Cervical back: Normal range of motion and neck supple.   Skin:      General: Skin is warm and dry.      Comments: Packing noted to left scrotum   Neurological:      Mental Status: He is oriented to person, place, and time.      Motor: Motor function is intact.   Psychiatric:         Mood and Affect: Mood normal.         Behavior: Behavior normal.         Thought Content: Thought content normal.       Significant Labs: All pertinent labs within the past 24 hours have been reviewed.    Significant Imaging: I have reviewed all pertinent imaging results/findings within the past 24 hours.

## 2022-11-20 NOTE — ASSESSMENT & PLAN NOTE
Mary's Gangrene of Scrotum  Leukocytosis  Lactic acidosis  Elevated troponin  - Patient criteria for sepsis with infective focus of Mary gangrene of the scrotum  - Lactic acid level 4.4, procalcitonin >48, WBC 24, and elevated troponin levels that trended down  - Urology consulted and patient underwent excision and debridement of necrotic scrotal tissue on 11/15; repeat debridement 11/17.  - Continue cefepime 1g IV q.12, clindamycin 900 mg IV Q 8, metronidazole 500 mg IV Q 8 and vancomycin dosed by pharmacy  - Elevated troponins likely secondary to sepsis and setting of DONNA given no reported chest pain and no acute EKG changes  - Blood cultures no growth to date and wound cultures with Anaerococcus species  - Wound care ordered and Urology following  - Clinically improving with resolution of lactic acidosis and definitive debridement  - Appreciate urology assistance.  - Will consult ID after getting finalization of wound culture results

## 2022-11-20 NOTE — ASSESSMENT & PLAN NOTE
- Potassium was 6.1 on admission with new DONNA  - Patient takes KCL supplementation daily along with aldactone  - Holding Aldactone and KCl supplementation  - Potassium level has normalized and has remained stable   - Continue to monitor

## 2022-11-20 NOTE — PROGRESS NOTES
[facility] Medicine  Progress Note    Patient Name: Nithin Wagner  MRN: [id number]  Patient Class: IP- Inpatient   Admission Date: 11/15/2022  Length of Stay: 4 days  Attending Physician: Darby Obrien MD  Primary Care Provider: Tushar Drew MD        Subjective:     Principal Problem:Sepsis        HPI:  The patient is a 67M with a past medical history of DM2, CAD, HTN, and HLD who presents with scrotal pain and swelling. Started about 4 weeks ago and has been progressively worsening despite a couple courses of antibiotics (unsure which ones). He reports fevers and chills at home, nausea, decreased PO intake, and multiple syncopal episodes with +incontinence of stool. Unable to urinate for the past 24 hours or so. Sugars 190s at home. He was seen yesterday in urgent care and referred to the ER, but says he wasn't able to make it at that time so he comes today instead. On initial workup, the patient has CT evidence of Fourniers gangrene.  He will be brought to the operating room emergently and admitted after to ICU for further management.      Overview/Hospital Course:  Mr. Wagner presented with scrotal pain and swelling.  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  Empiric initiated with cefepime, clindamycin, metronidazole, and vancomycin.  Urology consulted and he underwent emergent excision and debridement of necrotic tissue on 11/15. Repeat washout performed 11/17. Improved and stepped down from ICU 11/18.      Interval History: No acute event, reports pain with dressing changes that are not controlled, glucose with low level again this morning but patient without symptoms.    Review of Systems   Constitutional:  Negative for chills and fever.   Respiratory:  Negative for cough and shortness of breath.    Cardiovascular:  Negative for chest pain and palpitations.   Genitourinary:  Positive for scrotal swelling.   Objective:     Vital Signs (Most Recent):  Temp: 97.6 °F (36.4  °C) (11/19/22 2034)  Pulse: 88 (11/19/22 2034)  Resp: 18 (11/19/22 2144)  BP: (!) 162/90 (11/19/22 2034)  SpO2: 98 % (11/19/22 2036)   Vital Signs (24h Range):  Temp:  [97.4 °F (36.3 °C)-98.7 °F (37.1 °C)] 97.6 °F (36.4 °C)  Pulse:  [] 88  Resp:  [18-22] 18  SpO2:  [93 %-98 %] 98 %  BP: (128-162)/(74-95) 162/90     Weight: 114.1 kg (251 lb 8.7 oz)  Body mass index is 35.08 kg/m².    Intake/Output Summary (Last 24 hours) at 11/19/2022 2148  Last data filed at 11/19/2022 1724  Gross per 24 hour   Intake 650 ml   Output 2850 ml   Net -2200 ml      Physical Exam  Constitutional:       General: He is sleeping.      Appearance: He is well-developed. He is obese.   HENT:      Head: Normocephalic and atraumatic.      Nose: Nose normal.   Eyes:      Extraocular Movements: Extraocular movements intact.      Conjunctiva/sclera: Conjunctivae normal.   Cardiovascular:      Rate and Rhythm: Normal rate and regular rhythm.      Pulses:           Radial pulses are 2+ on the right side and 2+ on the left side.      Heart sounds: Normal heart sounds.   Pulmonary:      Effort: Pulmonary effort is normal.      Breath sounds: Normal breath sounds.   Abdominal:      General: Bowel sounds are normal. There is no distension.      Palpations: Abdomen is soft.      Tenderness: There is no abdominal tenderness. There is no guarding or rebound.   Musculoskeletal:         General: Normal range of motion.      Cervical back: Normal range of motion and neck supple.   Skin:     General: Skin is warm and dry.      Comments: Packing noted to left scrotum   Neurological:      Mental Status: He is oriented to person, place, and time.      Motor: Motor function is intact.   Psychiatric:         Mood and Affect: Mood normal.         Behavior: Behavior normal.         Thought Content: Thought content normal.       Significant Labs: All pertinent labs within the past 24 hours have been reviewed.    Significant Imaging: I have reviewed all pertinent  imaging results/findings within the past 24 hours.      Assessment/Plan:      * Sepsis  Mary's Gangrene of Scrotum  Leukocytosis  Lactic acidosis  Elevated troponin  - Patient criteria for sepsis with infective focus of Mary gangrene of the scrotum  - Lactic acid level 4.4, procalcitonin >48, WBC 24, and elevated troponin levels that trended down  - Urology consulted and patient underwent excision and debridement of necrotic scrotal tissue on 11/15; repeat debridement 11/17.  - Continue cefepime 1g IV q.12, clindamycin 900 mg IV Q 8, metronidazole 500 mg IV Q 8 and vancomycin dosed by pharmacy  - Elevated troponins likely secondary to sepsis and setting of DONNA given no reported chest pain and no acute EKG changes  - Blood cultures no growth to date and wound cultures with Anaerococcus species  - Wound care ordered and Urology following  - Clinically improving with resolution of lactic acidosis and definitive debridement  - Appreciate urology assistance.  - Will consult ID after getting finalization of wound culture results    DONNA (acute kidney injury)  - Creatinine 2.5, baseline- 1.3; likely due to hypotension/sepsis  - Improved with IV fluids and treatment of sepsis  - Creatinine down normal levels  - Continue monitor with repeat labs and follow I/O's    Chronic diastolic heart failure  CAD  - Last echo with normal EF and grade 1 diastolic dysfunction on 10/28/2022  - No overt signs of volume overload and no reported chest pain  - Elevated troponin due to sepsis with associated hypotension along with DONNA and CHF which were trended down and no ectopy noted on telemetry, and no acute EKG changes  - Continue digoxin 125 mcg p.o. daily, ASA 81 mg p.o. daily and ezetimibe 10 mg p.o. 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  - Glucoses low again today; decrease insulin detemir to 18U subQ daily from BID, continue moderate-dose sliding scale insulin aspart 1-10U subQ TIDWM PRN.  - Will adjust insulin regimen as needed to achieve glucose between 140 - 180    Essential hypertension  - Hypotensive was on admit that responded to treatment with IV fluids with no need for pressor support  - Blood pressure predominantly in normal range; monitor.  - Continue to hold antihypertensives for now.    VTE Risk Mitigation (From admission, onward)         Ordered     IP VTE HIGH RISK PATIENT  Once         11/15/22 1948     Reason for No Pharmacological VTE Prophylaxis  Once        Question:  Reasons:  Answer:  Risk of Bleeding    11/15/22 1948     Place sequential compression device  Until discontinued         11/15/22 1936                        Darby Obrien MD  Department of Hospital Medicine   [facility], [place], operated by [facility] ([place])  "

## 2022-11-20 NOTE — ASSESSMENT & PLAN NOTE
- HgbA1c of 7.6  - Continue cardiac/diabetic diet.  - Glucoses low again today; decrease insulin detemir to 18U subQ daily from BID, continue moderate-dose sliding scale insulin aspart 1-10U subQ TIDWM PRN.  - Will adjust insulin regimen as needed to achieve glucose between 140 - 180

## 2022-11-20 NOTE — ASSESSMENT & PLAN NOTE
- Creatinine 2.5, baseline- 1.3; likely due to hypotension/sepsis  - Improved with IV fluids and treatment of sepsis  - Creatinine down to normal levels  - Continue monitor with repeat labs and follow I/O's

## 2022-11-21 LAB
BACTERIA BLD CULT: NORMAL
BACTERIA BLD CULT: NORMAL
BACTERIA SPEC ANAEROBE CULT: ABNORMAL
BACTERIA SPEC ANAEROBE CULT: ABNORMAL

## 2022-11-21 PROCEDURE — 97165 OT EVAL LOW COMPLEX 30 MIN: CPT

## 2022-11-21 PROCEDURE — 97162 PT EVAL MOD COMPLEX 30 MIN: CPT

## 2022-11-21 PROCEDURE — 99900035 HC TECH TIME PER 15 MIN (STAT)

## 2022-11-21 PROCEDURE — 99233 SBSQ HOSP IP/OBS HIGH 50: CPT | Mod: ,,, | Performed by: HOSPITALIST

## 2022-11-21 PROCEDURE — 27000221 HC OXYGEN, UP TO 24 HOURS

## 2022-11-21 PROCEDURE — 99223 1ST HOSP IP/OBS HIGH 75: CPT | Mod: ,,, | Performed by: INTERNAL MEDICINE

## 2022-11-21 PROCEDURE — 25000003 PHARM REV CODE 250: Performed by: UROLOGY

## 2022-11-21 PROCEDURE — 63600175 PHARM REV CODE 636 W HCPCS: Performed by: HOSPITALIST

## 2022-11-21 PROCEDURE — 99232 PR SUBSEQUENT HOSPITAL CARE,LEVL II: ICD-10-PCS | Mod: GC,,, | Performed by: UROLOGY

## 2022-11-21 PROCEDURE — 63600175 PHARM REV CODE 636 W HCPCS: Performed by: NURSE PRACTITIONER

## 2022-11-21 PROCEDURE — 25000003 PHARM REV CODE 250: Performed by: INTERNAL MEDICINE

## 2022-11-21 PROCEDURE — 99232 SBSQ HOSP IP/OBS MODERATE 35: CPT | Mod: GC,,, | Performed by: UROLOGY

## 2022-11-21 PROCEDURE — 11000001 HC ACUTE MED/SURG PRIVATE ROOM

## 2022-11-21 PROCEDURE — 63600175 PHARM REV CODE 636 W HCPCS: Performed by: INTERNAL MEDICINE

## 2022-11-21 PROCEDURE — 99223 PR INITIAL HOSPITAL CARE,LEVL III: ICD-10-PCS | Mod: ,,, | Performed by: INTERNAL MEDICINE

## 2022-11-21 PROCEDURE — 99233 PR SUBSEQUENT HOSPITAL CARE,LEVL III: ICD-10-PCS | Mod: ,,, | Performed by: HOSPITALIST

## 2022-11-21 PROCEDURE — 97530 THERAPEUTIC ACTIVITIES: CPT

## 2022-11-21 PROCEDURE — 97535 SELF CARE MNGMENT TRAINING: CPT

## 2022-11-21 PROCEDURE — 94761 N-INVAS EAR/PLS OXIMETRY MLT: CPT

## 2022-11-21 PROCEDURE — 25000242 PHARM REV CODE 250 ALT 637 W/ HCPCS: Performed by: UROLOGY

## 2022-11-21 PROCEDURE — 25000003 PHARM REV CODE 250: Performed by: HOSPITALIST

## 2022-11-21 PROCEDURE — S0030 INJECTION, METRONIDAZOLE: HCPCS | Performed by: UROLOGY

## 2022-11-21 PROCEDURE — 94640 AIRWAY INHALATION TREATMENT: CPT

## 2022-11-21 RX ADMIN — MORPHINE SULFATE 6 MG: 2 INJECTION, SOLUTION INTRAMUSCULAR; INTRAVENOUS at 10:11

## 2022-11-21 RX ADMIN — MORPHINE SULFATE 6 MG: 2 INJECTION, SOLUTION INTRAMUSCULAR; INTRAVENOUS at 03:11

## 2022-11-21 RX ADMIN — NYSTATIN 500000 UNITS: 500000 SUSPENSION ORAL at 09:11

## 2022-11-21 RX ADMIN — MORPHINE SULFATE 6 MG: 2 INJECTION, SOLUTION INTRAMUSCULAR; INTRAVENOUS at 04:11

## 2022-11-21 RX ADMIN — OXYCODONE 15 MG: 5 TABLET ORAL at 01:11

## 2022-11-21 RX ADMIN — EZETIMIBE 10 MG: 10 TABLET ORAL at 09:11

## 2022-11-21 RX ADMIN — OXYCODONE HYDROCHLORIDE AND ACETAMINOPHEN 1 TABLET: 5; 325 TABLET ORAL at 09:11

## 2022-11-21 RX ADMIN — CLINDAMYCIN PHOSPHATE 900 MG: 900 INJECTION, SOLUTION INTRAVENOUS at 10:11

## 2022-11-21 RX ADMIN — CEFEPIME HYDROCHLORIDE 1 G: 1 INJECTION, SOLUTION INTRAVENOUS at 09:11

## 2022-11-21 RX ADMIN — CEFEPIME HYDROCHLORIDE 1 G: 1 INJECTION, SOLUTION INTRAVENOUS at 01:11

## 2022-11-21 RX ADMIN — ONDANSETRON 4 MG: 2 INJECTION INTRAMUSCULAR; INTRAVENOUS at 11:11

## 2022-11-21 RX ADMIN — ASPIRIN 81 MG CHEWABLE TABLET 81 MG: 81 TABLET CHEWABLE at 09:11

## 2022-11-21 RX ADMIN — IPRATROPIUM BROMIDE AND ALBUTEROL SULFATE 3 ML: 2.5; .5 SOLUTION RESPIRATORY (INHALATION) at 11:11

## 2022-11-21 RX ADMIN — NYSTATIN 500000 UNITS: 500000 SUSPENSION ORAL at 05:11

## 2022-11-21 RX ADMIN — DIGOXIN 125 MCG: 125 TABLET ORAL at 08:11

## 2022-11-21 RX ADMIN — THERA TABS 1 TABLET: TAB at 09:11

## 2022-11-21 RX ADMIN — METRONIDAZOLE 500 MG: 500 INJECTION, SOLUTION INTRAVENOUS at 05:11

## 2022-11-21 RX ADMIN — NYSTATIN 500000 UNITS: 500000 SUSPENSION ORAL at 01:11

## 2022-11-21 RX ADMIN — METRONIDAZOLE 500 MG: 500 INJECTION, SOLUTION INTRAVENOUS at 01:11

## 2022-11-21 RX ADMIN — SODIUM CHLORIDE: 0.9 INJECTION, SOLUTION INTRAVENOUS at 10:11

## 2022-11-21 RX ADMIN — CLINDAMYCIN PHOSPHATE 900 MG: 900 INJECTION, SOLUTION INTRAVENOUS at 02:11

## 2022-11-21 RX ADMIN — VANCOMYCIN HYDROCHLORIDE 1750 MG: 750 INJECTION, POWDER, LYOPHILIZED, FOR SOLUTION INTRAVENOUS at 12:11

## 2022-11-21 RX ADMIN — NYSTATIN 500000 UNITS: 500000 SUSPENSION ORAL at 08:11

## 2022-11-21 RX ADMIN — METRONIDAZOLE 500 MG: 500 INJECTION, SOLUTION INTRAVENOUS at 09:11

## 2022-11-21 RX ADMIN — INSULIN DETEMIR 18 UNITS: 100 INJECTION, SOLUTION SUBCUTANEOUS at 09:11

## 2022-11-21 RX ADMIN — MORPHINE SULFATE 6 MG: 2 INJECTION, SOLUTION INTRAMUSCULAR; INTRAVENOUS at 12:11

## 2022-11-21 RX ADMIN — FOLIC ACID 1 MG: 1 TABLET ORAL at 09:11

## 2022-11-21 RX ADMIN — CEFEPIME HYDROCHLORIDE 1 G: 1 INJECTION, SOLUTION INTRAVENOUS at 04:11

## 2022-11-21 NOTE — PLAN OF CARE
During my shift, pt AAOx4, NAD. VS stable. Pt remains afebrile. SPO2 wnl on 3L of O2 via NC. Pt reports scrotal pain that has been managed Percocet PO, Percolone PO and Morphine via IV. IV abx therapy in progress. No adverse reaction noted. Wound dressing change done as ordered. Pt tolerated well. Frequent soft, formed stools noted today. Indwelling mead catheter in place collected yellow urine. Pt tolerating diabetic diet. CBG readings via pt's dexacom wnl. No sliding scale insulin coverage needed. Pt remains free from calls or injury. Bed is lowered and locked. Call light is within reach. Pt has no known needs at this time.

## 2022-11-21 NOTE — PT/OT/SLP EVAL
Occupational Therapy   Evaluation    Name: Nithin Wagner  MRN: [id number]  Admitting Diagnosis:  Sepsis  Recent Surgery: Procedure(s) (LRB):  INCISION AND DRAINAGE, SCROTUM (N/A) 4 Days Post-Op    Recommendations:     Discharge Recommendations: home  Discharge Equipment Recommendations:  none  Barriers to discharge:  None    Assessment:     Nithin Wagner is a 67 y.o. male with a medical diagnosis of Sepsis.  He presents with edema. Performance deficits affecting function: pain, edema, impaired skin.  Pt agreeable to participating in therapy upon arrival to room.  Pt demonstrates strength and ROM in (B) UE needed for ADLs that is WNL, and is able to perform sit <> stand transfer with supervision.  While standing at sink pt able to perform multiple grooming tasks with supervision.  No instances of LOB noted while ambulating.    Overall, pt tolerated therapy well.  He would benefit from one additional skilled OT session to help maximize overall functional independence and safety.  No further OT needs recommended upon d/c from hospital.       Rehab Prognosis: Good; patient would benefit from acute skilled OT services to address these deficits and reach maximum level of function.       Plan:     Patient to be seen 3 x/week to address the above listed problems via self-care/home management, therapeutic activities, therapeutic exercises  Plan of Care Expires:    Plan of Care Reviewed with: patient    Subjective     Chief Complaint: Being bored in hospital  Patient/Family Comments/goals: Return home; resume PLOF    Occupational Profile:  Living Environment: Pt lives with 2 roommates in 2 story duplex, 6 ANITA (both in front and back) HR present; bathroom has tub/shower.  There are 15 steps from 1st to 2nd floor.    Previous level of function:   -ADLs: Mod I (increased time)-Independent.  Pt reports difficulty because of arthritis  -iADLs: Cooks meals.  Has a  that comes 1x a month to assist with cleaning   -Mobility:  Independent; when he has gout flare ups uses a wheelchair- requires assist to propel  Roles and Routines: Roommate, brother, uncle, previously working as pulmonary cardiac ultrasound technician; founded company.  States he has been declared disabled so not planning on returning to work.  Equipment Used at Home:  oxygen  Assistance upon Discharge: Roommates can help a little     Pain/Comfort:  Pain Rating 1:  (pt stated overall he felt 5/10; did not specify)  Pain Rating Post-Intervention 1:  (pt comfortable at rest)    Patients cultural, spiritual, Sabianism conflicts given the current situation: no    Objective:     Communicated with: RN ashley) prior to session.  Patient found HOB elevated with mead catheter, oxygen, peripheral IV upon OT entry to room.    General Precautions: Standard,  (none)   Orthopedic Precautions:N/A   Braces: N/A  Respiratory Status: Nasal cannula, flow 3 L/min    Occupational Performance:    Bed Mobility:    Supine <> sit: Mod I with use of bed rail  Scooting: Independent  Sit <> Supine: Independent     Functional Mobility/Transfers:  Sit <> Stand: Supervision x 3 trials form EOB  Functional Mobility: Pt ambulated ~25 ft in room with supervision.  No instances of LOB noted.    Activities of Daily Living:  Grooming: supervision for cleaning mouth with  and washing hands while standing at sink.    Cognitive/Visual Perceptual:  Cognitive/Psychosocial Skills:    -       Oriented to: Person, Place, Time, and Situation   -       Follows Commands/attention: Follows multistep commands  -       Communication: clear/fluent  -       Memory: No Deficits noted  -       Safety awareness/insight to disability: intact   -       Mood/Affect/Coping skills/emotional control: Cooperative and Pleasant    Physical Exam:  Postural examination/scapula alignment: No postural abnormalities identified  Skin integrity: Visible skin intact  Edema: Scrotal edema present  Sensation: Intact  Motor Planning:  WNL  Dominant hand: Right  Upper Extremity Range of Motion:    -       Right Upper Extremity: WNL  -       Left Upper Extremity: WNL  Upper Extremity Strength:   -       Right Upper Extremity: WNL; grossly 5/5 all muscle groups  -       Left Upper Extremity: WNL; grossly 5/5 all muscle groups   Strength: WNL  Fine Motor Coordination: Intact  Gross motor coordination:   WNL  Balance:  Sitting- Independent; Standing- Supervision- Independent    AMPAC 6 Click ADL:  AMPAC Total Score: 20    Treatment & Education:  *Pt educated on role of OT in acute care setting  *Pt stood at sink to perform grooming tasks; cues for safety provided  *POC reviewed     Patient left HOB elevated with all lines intact and call button in reach    GOALS:   Multidisciplinary Problems       Occupational Therapy Goals          Problem: Occupational Therapy    Goal Priority Disciplines Outcome Interventions   Occupational Therapy Goal     OT, PT/OT Ongoing, Progressing    Description: Goals to be met by: 11/28/2022     Patient will increase functional independence with ADLs by performing:    UE Dressing with [place].  LE Dressing with [place].  Grooming while standing at sink with [place].  Toileting from toilet with [place] for hygiene and clothing management.   Toilet transfer to toilet with [place].                         History:     Past Medical History:   Diagnosis Date    Arthritis     Atrial myxoma     CHF (congestive heart failure)     Coronary atherosclerosis     Diabetes mellitus, type 2     Difficult intubation     Hepatitis B     Hyperlipidemia     Hypertension     Non-alcoholic fatty liver disease     Rheumatoid arthritis     Rheumatoid arthritis flare 07/12/2021    Stroke     TIA    Systolic heart failure          Past Surgical History:   Procedure Laterality Date    CORONARY ANGIOGRAPHY N/A 3/10/2021    Procedure: ANGIOGRAM, CORONARY ARTERY - right radial;  Surgeon: Shemar Dempsey MD;  Location: [facility]  CATH LAB;  Service: Cardiology;  Laterality: N/A;    CORONARY STENT PLACEMENT  03/10/2021    prox-mid RCA [place] 4.5 x 26 mm, 4.5 x 12 mm    INCISION AND DRAINAGE OF SCROTUM N/A 11/15/2022    Procedure: INCISION AND DRAINAGE, SCROTUM;  Surgeon: William Hatch MD;  Location: [facility] OR;  Service: Urology;  Laterality: N/A;    INCISION AND DRAINAGE OF SCROTUM N/A 11/17/2022    Procedure: INCISION AND DRAINAGE, SCROTUM;  Surgeon: William Hatch MD;  Location: [facility] OR;  Service: Urology;  Laterality: N/A;    LUNG LOBECTOMY Right 2008    RUL lobectomy after removal of atrial myxoma    PLEURA BIOPSY      RESECTION OF ATRIAL MYXOMA  2007       Time Tracking:     OT Date of Treatment: 11/21/22  OT Start Time: 1040  OT Stop Time: 1106  OT Total Time (min): 26 min    Billable Minutes:Evaluation 15  Self Care/Home Management 8    HI Huffman  11/21/2022

## 2022-11-21 NOTE — SUBJECTIVE & OBJECTIVE
Past Medical History:   Diagnosis Date    Arthritis     Atrial myxoma     CHF (congestive heart failure)     Coronary atherosclerosis     Diabetes mellitus, type 2     Difficult intubation     Hepatitis B     Hyperlipidemia     Hypertension     Non-alcoholic fatty liver disease     Rheumatoid arthritis     Rheumatoid arthritis flare 07/12/2021    Stroke     TIA    Systolic heart failure        Past Surgical History:   Procedure Laterality Date    CORONARY ANGIOGRAPHY N/A 3/10/2021    Procedure: ANGIOGRAM, CORONARY ARTERY - right radial;  Surgeon: Shemar Dempsey MD;  Location: [facility], [place], operated by [facility] CATH LAB;  Service: Cardiology;  Laterality: N/A;    CORONARY STENT PLACEMENT  03/10/2021    prox-mid RCA [place] 4.5 x 26 mm, 4.5 x 12 mm    INCISION AND DRAINAGE OF SCROTUM N/A 11/15/2022    Procedure: INCISION AND DRAINAGE, SCROTUM;  Surgeon: William Hatch MD;  Location: [facility], [place], operated by [facility] OR;  Service: Urology;  Laterality: N/A;    INCISION AND DRAINAGE OF SCROTUM N/A 11/17/2022    Procedure: INCISION AND DRAINAGE, SCROTUM;  Surgeon: William Hatch MD;  Location: [facility], [place], operated by [facility] OR;  Service: Urology;  Laterality: N/A;    LUNG LOBECTOMY Right 2008    RUL lobectomy after removal of atrial myxoma    PLEURA BIOPSY      RESECTION OF ATRIAL MYXOMA  2007       Review of patient's allergies indicates:   Allergen Reactions    Enbrel [etanercept] Shortness Of Breath     CHF    Nsaids (non-steroidal anti-inflammatory drug) Other (See Comments)     hypertention  Other reaction(s): Other (See Comments)  hypertention  HTN    Statins-hmg-coa reductase inhibitors Other (See Comments)     Heart arrythemias  Other reaction(s): Other (See Comments)  Heart arrythemias  Joint pain and cardiac arrythmias    Pcn [penicillins] Rash       Medications:  Medications Prior to Admission   Medication Sig    aspirin 81 MG Chew Take 81 mg by mouth.    blood sugar diagnostic Strp To check BG 3 times daily, to use with insurance preferred meter    blood-glucose meter kit Use to check  glucose as directed    ciclopirox (PENLAC) 8 % Soln Apply topically nightly.    clotrimazole (LOTRIMIN) 1 % cream Apply topically 2 (two) times daily.    digoxin (LANOXIN) 125 mcg tablet Take 1 tablet (125 mcg total) by mouth once daily.    dulaglutide (TRULICITY) 4.5 mg/0.5 mL pen injector Inject 4.5 mg into the skin every 7 days.    ergocalciferol (ERGOCALCIFEROL) 50,000 unit Cap Take 1 capsule (50,000 Units total) by mouth every 7 days.    ezetimibe (ZETIA) 10 mg tablet TAKE 1 TABLET BY MOUTH EVERY DAY    folic acid (FOLVITE) 1 MG tablet     furosemide (LASIX) 40 MG tablet Take 1 tablet (40 mg total) by mouth once daily. (Patient taking differently: Take 40 mg by mouth every other day. Alternates 40mg and 80mg every other day)    hydroCHLOROthiazide (HYDRODIURIL) 25 MG tablet Take 1 tablet (25 mg total) by mouth once daily. (Patient taking differently: Take 25 mg by mouth as needed.)    hydrocodone-acetaminophen 10-325mg (NORCO)  mg Tab     imiquimod (ALDARA) 5 % cream APPLY 3 DAYS A WEEK TO WARTS UNTIL COMPLETE CLEARANCE OF WARTS    insulin admin supplies InPn InPen device, use to inject mealtime insulin 3 times daily. 1 pen to use, 1 for backup as needed.    insulin degludec (TRESIBA FLEXTOUCH U-200) 200 unit/mL (3 mL) insulin pen Inject 50 Units into the skin 2 (two) times a day.    lamiVUDine (EPIVIR) 150 MG Tab Take 1 tablet (150 mg total) by mouth once daily.    LINZESS 72 mcg Cap capsule     minocycline (MINOCIN,DYNACIN) 100 MG capsule Take 100 mg by mouth 2 (two) times daily.    multivitamin (THERAGRAN) per tablet Take 1 tablet by mouth once daily.    nitroGLYCERIN (NITROSTAT) 0.4 MG SL tablet Place 1 tablet (0.4 mg total) under the tongue every 5 (five) minutes as needed for Chest pain.    NOVOLOG PENFILL U-100 INSULIN 100 unit/mL Crtg INJECT 30 UNITS INTO THE SKIN THREE TIMES A DAY WITH MEALS. MAX DAILY DOSE 100 UNITS PER DAY    nystatin (MYCOSTATIN) cream Apply topically 3 (three) times daily as  "needed for Dry Skin.    olmesartan (BENICAR) 40 MG tablet Take 1 tablet (40 mg total) by mouth nightly.    ondansetron (ZOFRAN) 4 MG tablet Take 1 tablet (4 mg total) by mouth every 8 (eight) hours as needed for Nausea.    OXYGEN-AIR DELIVERY SYSTEMS MISC by [facility].(Non-Drug; Combo Route) route.    pen needle, diabetic (BD ULTRA-FINE RAHUL PEN NEEDLE) 32 gauge x 5/32" Ndle Use to inject insulin 5 times daily    potassium chloride (MICRO-K) 10 MEQ CpSR Take 10 mEq by mouth once daily.    predniSONE (DELTASONE) 10 MG tablet Take 1 tablet (10 mg total) by mouth once daily.    predniSONE (DELTASONE) 2.5 MG tablet Take 1 tablet (2.5 mg total) by mouth once daily.    predniSONE (DELTASONE) 2.5 MG tablet TAKE 3 TABLETS (7.5 MG TOTAL) BY MOUTH ONCE DAILY.    predniSONE (DELTASONE) 5 MG tablet TAKE 1 TABLET BY MOUTH EVERY DAY    PROAIR HFA 90 mcg/actuation inhaler     spironolactone (ALDACTONE) 25 MG tablet TAKE 1 TABLET BY MOUTH EVERY DAY    sulfamethoxazole-trimethoprim 800-160mg (BACTRIM DS) 800-160 mg Tab TAKE 1 TABLET BY MOUTH ON MON,WEDS, FRIDAY    vortioxetine (TRINTELLIX) 10 mg Tab Take 10 mg by mouth once daily at 6am.     Antibiotics (From admission, onward)      Start     Stop Route Frequency Ordered    11/18/22 0930  vancomycin (VANCOCIN) 1,750 mg in sodium chloride 0.9% 500 mL IVPB         -- IV Every 12 hours (non-standard times) 11/18/22 0820    11/18/22 0900  cefepime in dextrose 5 % 1 gram/50 mL IVPB 1 g         -- IV Every 8 hours (non-standard times) 11/18/22 0814    11/18/22 0810  vancomycin - pharmacy to dose  (vancomycin IVPB)        See Hyperspace for full Linked Orders Report.    -- IV pharmacy to manage frequency 11/18/22 0810    11/16/22 0130  clindamycin in D5W 900 mg/50 mL IVPB 900 mg  (ED Adult Sepsis Treatment)         -- IV Every 8 hours (non-standard times) 11/15/22 1944    11/15/22 2100  metronidazole IVPB 500 mg         -- IV Every 8 hours (non-standard times) 11/15/22 1954          Antifungals " (From admission, onward)      Start     Stop Route Frequency Ordered    11/20/22 2100  nystatin 100,000 unit/mL suspension 500,000 Units         -- Oral 4 times daily with meals & nightly 11/20/22 1700          Antivirals (From admission, onward)      None             Immunization History   Administered Date(s) Administered    COVID-19, MRNA, LN-S, PF (Pfizer) (Purple Cap) 01/23/2021, 02/13/2021    Hepatitis A, Adult 08/16/2021    Influenza 02/26/2019    Pneumococcal Conjugate - 13 Valent 08/13/2021    Pneumococcal Polysaccharide - 23 Valent 10/20/2021    Tdap 12/04/2021       Family History       Problem Relation (Age of Onset)    Cancer Sister    Diabetes type I Father    Diabetes type II Mother    Hodgkin's lymphoma Mother    Kidney failure Father          Social History     Socioeconomic History    Marital status: Single   Occupational History    Occupation: , respiratory therapist, 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[employer]     Comment: Retired   Tobacco Use    Smoking status: Some Days     Packs/day: 1.00     Years: 35.00     Pack years: 35.00     Types: Cigarettes    Smokeless tobacco: Never   Substance and Sexual Activity    Alcohol use: Not Currently    Drug use: No    Sexual activity: Never     Social Determinants of Health     Financial Resource Strain: High Risk    Difficulty of Paying Living Expenses: Hard   Food Insecurity: No Food Insecurity    Worried About Running Out of Food in the Last Year: Never true    Ran Out of Food in the Last Year: Never true   Transportation Needs: Unmet Transportation Needs    Lack of Transportation (Medical): Yes    Lack of Transportation (Non-Medical): Yes   Physical Activity: Inactive    Days of Exercise per Week: 0 days    Minutes of Exercise per Session: 0 min   Stress: Stress Concern Present    Feeling of Stress : To some extent   Social Connections: Socially Isolated    Frequency of Communication with Friends and Family: More than three times a week    Frequency of Social  Gatherings with Friends and Family: More than three times a week    Attends Hindu Services: Never    Active Member of Clubs or Organizations: No    Attends Club or Organization Meetings: Never    Marital Status: Never    Housing Stability: High Risk    Unable to Pay for Housing in the Last Year: Yes    Unstable Housing in the Last Year: No     Review of Systems   Skin:  Positive for wound.   All other systems reviewed and are negative.  Objective:     Vital Signs (Most Recent):  Temp: 97.2 °F (36.2 °C) (11/21/22 0716)  Pulse: (!) 156 (11/21/22 0716)  Resp: 18 (11/21/22 0919)  BP: 138/83 (11/21/22 0716)  SpO2: 95 % (11/21/22 0716)   Vital Signs (24h Range):  Temp:  [97.2 °F (36.2 °C)-98 °F (36.7 °C)] 97.2 °F (36.2 °C)  Pulse:  [] 156  Resp:  [16-20] 18  SpO2:  [93 %-97 %] 95 %  BP: (124-174)/(76-91) 138/83     Weight: 114.1 kg (251 lb 8.7 oz)  Body mass index is 35.08 kg/m².    Estimated Creatinine Clearance: 127.9 mL/min (based on SCr of 0.7 mg/dL).    Physical Exam  Vitals and nursing note reviewed.   Constitutional:       Appearance: Normal appearance.   HENT:      Head: Normocephalic and atraumatic.      Right Ear: External ear normal.      Left Ear: External ear normal.   Eyes:      Extraocular Movements: Extraocular movements intact.      Conjunctiva/sclera: Conjunctivae normal.      Pupils: Pupils are equal, round, and reactive to light.   Cardiovascular:      Rate and Rhythm: Normal rate and regular rhythm.   Genitourinary:     Comments: Scrotum packed and dressed  Skin:     Findings: Erythema present. No rash.   Neurological:      General: No focal deficit present.      Mental Status: He is alert and oriented to person, place, and time. Mental status is at baseline.   Psychiatric:         Mood and Affect: Mood normal.         Behavior: Behavior normal.         Thought Content: Thought content normal.         Judgment: Judgment normal.       Significant Labs: Blood Culture:   Recent Labs    Lab 11/15/22  1741 11/15/22  1742   LABBLOO No growth after 5 days. No growth after 5 days.     CBC:   Recent Labs   Lab 11/20/22  1238   WBC 9.71   HGB 14.3   HCT 44.3        Urine Culture: No results for input(s): LABURIN in the last 4320 hours.  Wound Culture:   Recent Labs   Lab 11/15/22  2139   LABAERO No growth     All pertinent labs within the past 24 hours have been reviewed.    Significant Imaging: I have reviewed all pertinent imaging results/findings within the past 24 hours.

## 2022-11-21 NOTE — ASSESSMENT & PLAN NOTE
68 yo male who presented with Mary's. S/p excision and debridement of scrotum 11/15/22 and 11/17/22.    - Admitted to hospital medicine, appreciate assistance  - Second debridement 11/17 in OR. Clean wound edges. Medial aspect stable.  - Diet, insulin and pain control per primary    - Continue broad spectrum antibiotics  - Anaerobic culture with anaerococcus murdochii  - Will consult ID today, appreciate recs  - Dressing changes BID; will ask nursing staff to change dressing with wound care is not available; spoke with Mr Wagner and he agrees to this plan  - Rest of care per primary     follow up wound care recs

## 2022-11-21 NOTE — NURSING
[facility] ([place])  Wound Care    Patient Name:  Nithin Wagner   MRN:  [id number]  Date: 11/21/2022  Diagnosis: Sepsis    History:     Past Medical History:   Diagnosis Date    Arthritis     Atrial myxoma     CHF (congestive heart failure)     Coronary atherosclerosis     Diabetes mellitus, type 2     Difficult intubation     Hepatitis B     Hyperlipidemia     Hypertension     Non-alcoholic fatty liver disease     Rheumatoid arthritis     Rheumatoid arthritis flare 07/12/2021    Stroke     TIA    Systolic heart failure        Social History     Socioeconomic History    Marital status: Single   Occupational History    Occupation: , respiratory therapist, 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   Tobacco Use    Smoking status: Some Days     Packs/day: 1.00     Years: 35.00     Pack years: 35.00     Types: Cigarettes    Smokeless tobacco: Never   Substance and Sexual Activity    Alcohol use: Not Currently    Drug use: No    Sexual activity: Never     Social Determinants of Health     Financial Resource Strain: High Risk    Difficulty of Paying Living Expenses: Hard   Food Insecurity: No Food Insecurity    Worried About Running Out of Food in the Last Year: Never true    Ran Out of Food in the Last Year: Never true   Transportation Needs: Unmet Transportation Needs    Lack of Transportation (Medical): Yes    Lack of Transportation (Non-Medical): Yes   Physical Activity: Inactive    Days of Exercise per Week: 0 days    Minutes of Exercise per Session: 0 min   Stress: Stress Concern Present    Feeling of Stress : To some extent   Social Connections: Socially Isolated    Frequency of Communication with Friends and Family: More than three times a week    Frequency of Social Gatherings with Friends and Family: More than three times a week    Attends Advent Services: Never    Active Member of Clubs or Organizations: No    Attends Club or Organization Meetings: Never    Marital Status: Never     Housing Stability: High Risk    Unable to Pay for Housing in the Last Year: Yes    Unstable Housing in the Last Year: No           [facility] Assessment Details:      11/21/22 1307        Incision/Site 11/17/22 1639 Scrotum   Date First Assessed/Time First Assessed: 11/17/22 1639   Location: Scrotum   Wound Image    Incision WDL ex   Dressing Appearance No dressing   Drainage Amount Moderate   Drainage Characteristics/Odor Sanguineous   Appearance Moist   Red (%), Wound Tissue Color 50 %   Yellow (%), Wound Tissue Color 50 %   Periwound Area Swelling;Redness   Wound Edges Open   Wound Depth (cm)   (@12:00-4.7cm  @9:00-4.7cm  @6:00-5.3cm)   Care Cleansed with:;Antimicrobial agent;Wound cleanser;Applied:;Skin Barrier   Dressing Applied;Gauze;Absorptive Pad;Non-adherent;Other (comment)  (ABD hammock)   Packing packed with;other (see comment)  (vashe moist kerlix)   Packing Inserted  2   Periwound Care Cleansed with pH balanced cleanser;Absorptive dressing applied;Skin barrier film applied   Dressing Change Due 11/21/22 11/21/22 1307        Altered Skin Integrity 11/15/22 2006 Right anterior;lower Leg #1 Abrasion(s) Intact skin with non-blanchable redness of localized area   Date First Assessed/Time First Assessed: 11/15/22 2006   Altered Skin Integrity Present on Admission: yes  Side: Right  Orientation: anterior;lower  Location: Leg  Wound Number: #1  Is this injury device related?: No  Primary Wound Type: Abrasion(s)  ...   Wound Image    Description of Altered Skin Integrity Intact skin with non-blanchable redness of localized area   Dressing Appearance Open to air   Drainage Amount None   Appearance Intact;Blistered   Periwound Area Intact   Dressing Applied;Non-adherent;Silicone;Foam   Dressing Change Due 11/21/22 11/21/22 1307        Altered Skin Integrity 11/15/22 2007 Right anterior;distal;lower Leg #2 Skin Tear Partial thickness tissue loss. Shallow open ulcer with a red or pink wound bed, without  slough. Intact or Open/Ruptured Serum-filled blister.   Date First Assessed/Time First Assessed: 11/15/22 2007   Altered Skin Integrity Present on Admission: yes  Side: Right  Orientation: anterior;distal;lower  Location: Leg  Wound Number: #2  Is this injury device related?: No  Primary Wound Type: Skin T...   Wound Image    Description of Altered Skin Integrity Partial thickness tissue loss. Shallow open ulcer with a red or pink wound bed, without slough. Intact or Open/Ruptured Serum-filled blister.   Drainage Amount None   Drainage Characteristics/Odor No odor   Appearance Fibrin   Yellow (%), Wound Tissue Color 100 %   Periwound Area Dry   Wound Edges Open   Dressing Applied;Non-adherent;Foam;Silicone     Additional wound care recs made for RLE and LLE.  RLE apply triad paste and cover with silicone foam, secondary dressing-Daily.  LLE protect with silicone foam dressing and change every 5 days.    No changes recommended for the scrotal I&D, to be completed by nursing every shift:  Scrotum- Irrigate with Vashe wound solution. Moisten Kerlix roll with Vashe and wring out excess liquid. Apply to wound bed tucking into all wound tunnels and undermining so that all aspects of the wound bed are covered. Add additional to lightly fill the wound bed (not tightly pack). Cover with Abd pads/ mesh underwear. Change per orders or if dressing comes loose or saturated.    11/21/2022

## 2022-11-21 NOTE — PLAN OF CARE
Problem: Adult Inpatient Plan of Care  Goal: Plan of Care Review  Outcome: Ongoing, Progressing  Goal: Patient-Specific Goal (Individualized)  Outcome: Ongoing, Progressing  Goal: Absence of Hospital-Acquired Illness or Injury  Outcome: Ongoing, Progressing  Goal: Optimal Comfort and Wellbeing  Outcome: Ongoing, Progressing  Goal: Readiness for Transition of Care  Outcome: Ongoing, Progressing     Problem: Impaired Wound Healing  Goal: Optimal Wound Healing  Outcome: Ongoing, Progressing     Problem: Infection  Goal: Absence of Infection Signs and Symptoms  Outcome: Ongoing, Progressing     Problem: Skin Injury Risk Increased  Goal: Skin Health and Integrity  Outcome: Ongoing, Progressing     Problem: Fall Injury Risk  Goal: Absence of Fall and Fall-Related Injury  Outcome: Ongoing, Progressing

## 2022-11-21 NOTE — PLAN OF CARE
CM spoke with patient to discuss therapy recs of Home Health. Patient choice form signed and would like [facility].    11/21/22 1241   Post-Acute Status   Post-Acute Authorization Home Health   Home Health Status Referrals Sent   Patient choice form signed by patient/caregiver List with quality metrics by geographic area provided;List from CMS Compare;List from System Post-Acute Care   Discharge Delays None known at this time   Discharge Plan   Discharge Plan A Home Health   Discharge Plan B Home

## 2022-11-21 NOTE — PLAN OF CARE
Problem: Occupational Therapy  Goal: Occupational Therapy Goal  Description: Goals to be met by: 11/28/2022     Patient will increase functional independence with ADLs by performing:    UE Dressing with [place].  LE Dressing with [place].  Grooming while standing at sink with [place].  Toileting from toilet with [place] for hygiene and clothing management.   Toilet transfer to toilet with [place].    Outcome: Ongoing, Progressing     OT evaluation complete and POC established.  Pt is performing near baseline and would benefit from one additional skilled OT session to maximize overall functional independence.  No further OT needs recommended upon d/c from hospital.    HI Huffman  11/21/2022

## 2022-11-21 NOTE — CONSULTS
[facility] ([place])  Infectious Disease  Consult Note    Patient Name: Nithin Wagner  MRN: [id number]  Admission Date: 11/15/2022  Hospital Length of Stay: 6 days  Attending Physician: Darby Obrien MD  Primary Care Provider: Tushar rDew MD     Isolation Status: No active isolations    Patient information was obtained from patient, past medical records and ER records.      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 I will follow-up with patient. Please contact us if you have any additional questions.    Raj Christianson MD  Infectious Disease  [facility] ([place])    Subjective:     Principal Problem: Sepsis    HPI: 67M h/o DM2, CAD, HTN, HLD presents with scrotal pain and swelling. Started about 4 weeks ago and has been progressively worsening despite a couple courses of antibiotics (most recently Bactrim). He reports fevers and chills at home. He also reports nausea, decreased PO intake, multiple syncopal episodes, and stool incontinence. He is on continuous blood glucose monitoring and states blood sugars have been in 190s today. He was seen PTA in urgent care and diagnosed with scrotal abscess with possible Mary's; he referred to the ER but waited to present. After taking a nap when getting his toothbrush, he woke up confused and in feces. He was hypotensive and tachycardic, on arrival in the ED. Sepsis protocol was started. Labs and imaging are noted c/w  scrotal abscess and Mary's. Patient went to OR twice for debridement. Appropriate cultures were obtained and grew Anaerococcus. The patient is currently on cefepime, vancomycin, and Flagyl. ID is consulted for abx recommendations for Mary's.        Past Medical History:   Diagnosis Date    Arthritis     Atrial myxoma     CHF (congestive heart failure)     Coronary atherosclerosis     Diabetes mellitus, type 2     Difficult intubation     Hepatitis B     Hyperlipidemia     Hypertension     Non-alcoholic fatty liver disease     Rheumatoid arthritis     Rheumatoid arthritis flare 07/12/2021    Stroke     TIA    Systolic heart failure        Past Surgical History:   Procedure Laterality Date    CORONARY ANGIOGRAPHY N/A 3/10/2021    Procedure: ANGIOGRAM, CORONARY ARTERY - right radial;  Surgeon: Shemar Dempsey MD;  Location: [facility] CATH LAB;  Service: Cardiology;  Laterality: N/A;    CORONARY STENT PLACEMENT  03/10/2021    prox-mid RCA [place] 4.5 x 26 mm, 4.5 x 12 mm    INCISION AND DRAINAGE OF SCROTUM N/A 11/15/2022    Procedure: INCISION AND DRAINAGE, SCROTUM;  Surgeon: William Hatch MD;  Location: [facility] OR;  Service: Urology;  Laterality: N/A;    INCISION AND DRAINAGE OF SCROTUM N/A 11/17/2022    Procedure: INCISION AND DRAINAGE, SCROTUM;  Surgeon: William Hatch MD;  Location: [facility] OR;  Service: Urology;  Laterality: N/A;    LUNG LOBECTOMY Right 2008    RUL lobectomy after removal of atrial myxoma    PLEURA BIOPSY      RESECTION OF ATRIAL MYXOMA  2007       Review of patient's allergies indicates:   Allergen Reactions    Enbrel [etanercept] Shortness Of Breath     CHF    Nsaids (non-steroidal anti-inflammatory drug) Other (See Comments)     hypertention  Other reaction(s): Other (See Comments)  hypertention  HTN    Statins-hmg-coa reductase inhibitors Other (See Comments)     Heart arrythemias  Other reaction(s): Other (See Comments)  Heart arrythemias  Joint pain and cardiac  arrythmias    Pcn [penicillins] Rash       Medications:  Medications Prior to Admission   Medication Sig    aspirin 81 MG Chew Take 81 mg by mouth.    blood sugar diagnostic Strp To check BG 3 times daily, to use with insurance preferred meter    blood-glucose meter kit Use to check glucose as directed    ciclopirox (PENLAC) 8 % Soln Apply topically nightly.    clotrimazole (LOTRIMIN) 1 % cream Apply topically 2 (two) times daily.    digoxin (LANOXIN) 125 mcg tablet Take 1 tablet (125 mcg total) by mouth once daily.    dulaglutide (TRULICITY) 4.5 mg/0.5 mL pen injector Inject 4.5 mg into the skin every 7 days.    ergocalciferol (ERGOCALCIFEROL) 50,000 unit Cap Take 1 capsule (50,000 Units total) by mouth every 7 days.    ezetimibe (ZETIA) 10 mg tablet TAKE 1 TABLET BY MOUTH EVERY DAY    folic acid (FOLVITE) 1 MG tablet     furosemide (LASIX) 40 MG tablet Take 1 tablet (40 mg total) by mouth once daily. (Patient taking differently: Take 40 mg by mouth every other day. Alternates 40mg and 80mg every other day)    hydroCHLOROthiazide (HYDRODIURIL) 25 MG tablet Take 1 tablet (25 mg total) by mouth once daily. (Patient taking differently: Take 25 mg by mouth as needed.)    hydrocodone-acetaminophen 10-325mg (NORCO)  mg Tab     imiquimod (ALDARA) 5 % cream APPLY 3 DAYS A WEEK TO WARTS UNTIL COMPLETE CLEARANCE OF WARTS    insulin admin supplies InPn InPen device, use to inject mealtime insulin 3 times daily. 1 pen to use, 1 for backup as needed.    insulin degludec (TRESIBA FLEXTOUCH U-200) 200 unit/mL (3 mL) insulin pen Inject 50 Units into the skin 2 (two) times a day.    lamiVUDine (EPIVIR) 150 MG Tab Take 1 tablet (150 mg total) by mouth once daily.    LINZESS 72 mcg Cap capsule     minocycline (MINOCIN,DYNACIN) 100 MG capsule Take 100 mg by mouth 2 (two) times daily.    multivitamin (THERAGRAN) per tablet Take 1 tablet by mouth once daily.    nitroGLYCERIN (NITROSTAT) 0.4 MG SL tablet Place  "1 tablet (0.4 mg total) under the tongue every 5 (five) minutes as needed for Chest pain.    NOVOLOG PENFILL U-100 INSULIN 100 unit/mL Crtg INJECT 30 UNITS INTO THE SKIN THREE TIMES A DAY WITH MEALS. MAX DAILY DOSE 100 UNITS PER DAY    nystatin (MYCOSTATIN) cream Apply topically 3 (three) times daily as needed for Dry Skin.    olmesartan (BENICAR) 40 MG tablet Take 1 tablet (40 mg total) by mouth nightly.    ondansetron (ZOFRAN) 4 MG tablet Take 1 tablet (4 mg total) by mouth every 8 (eight) hours as needed for Nausea.    OXYGEN-AIR DELIVERY SYSTEMS MISC by Misc.(Non-Drug; Combo Route) route.    pen needle, diabetic (BD ULTRA-FINE RAHUL PEN NEEDLE) 32 gauge x 5/32" Ndle Use to inject insulin 5 times daily    potassium chloride (MICRO-K) 10 MEQ CpSR Take 10 mEq by mouth once daily.    predniSONE (DELTASONE) 10 MG tablet Take 1 tablet (10 mg total) by mouth once daily.    predniSONE (DELTASONE) 2.5 MG tablet Take 1 tablet (2.5 mg total) by mouth once daily.    predniSONE (DELTASONE) 2.5 MG tablet TAKE 3 TABLETS (7.5 MG TOTAL) BY MOUTH ONCE DAILY.    predniSONE (DELTASONE) 5 MG tablet TAKE 1 TABLET BY MOUTH EVERY DAY    PROAIR HFA 90 mcg/actuation inhaler     spironolactone (ALDACTONE) 25 MG tablet TAKE 1 TABLET BY MOUTH EVERY DAY    sulfamethoxazole-trimethoprim 800-160mg (BACTRIM DS) 800-160 mg Tab TAKE 1 TABLET BY MOUTH ON MON,WEDS, FRIDAY    vortioxetine (TRINTELLIX) 10 mg Tab Take 10 mg by mouth once daily at 6am.     Antibiotics (From admission, onward)      Start     Stop Route Frequency Ordered    11/18/22 0930  vancomycin (VANCOCIN) 1,750 mg in sodium chloride 0.9% 500 mL IVPB         -- IV Every 12 hours (non-standard times) 11/18/22 0820    11/18/22 0900  cefepime in dextrose 5 % 1 gram/50 mL IVPB 1 g         -- IV Every 8 hours (non-standard times) 11/18/22 0814    11/18/22 0810  vancomycin - pharmacy to dose  (vancomycin IVPB)        See Hyperspace for full Linked Orders Report.    -- IV " pharmacy to manage frequency 11/18/22 0810    11/16/22 0130  clindamycin in D5W 900 mg/50 mL IVPB 900 mg  (ED Adult Sepsis Treatment)         -- IV Every 8 hours (non-standard times) 11/15/22 1944    11/15/22 2100  metronidazole IVPB 500 mg         -- IV Every 8 hours (non-standard times) 11/15/22 1954          Antifungals (From admission, onward)      Start     Stop Route Frequency Ordered    11/20/22 2100  nystatin 100,000 unit/mL suspension 500,000 Units         -- Oral 4 times daily with meals & nightly 11/20/22 1700          Antivirals (From admission, onward)      None             Immunization History   Administered Date(s) Administered    COVID-19, MRNA, LN-S, PF (Pfizer) (Purple Cap) 01/23/2021, 02/13/2021    Hepatitis A, Adult 08/16/2021    Influenza 02/26/2019    Pneumococcal Conjugate - 13 Valent 08/13/2021    Pneumococcal Polysaccharide - 23 Valent 10/20/2021    Tdap 12/04/2021       Family History       Problem Relation (Age of Onset)    Cancer Sister    Diabetes type I Father    Diabetes type II Mother    Hodgkin's lymphoma Mother    Kidney failure Father          Social History     Socioeconomic History    Marital status: Single   Occupational History    Occupation: , respiratory therapist, founded [place]     Comment: Retired   Tobacco Use    Smoking status: Some Days     Packs/day: 1.00     Years: 35.00     Pack years: 35.00     Types: Cigarettes    Smokeless tobacco: Never   Substance and Sexual Activity    Alcohol use: Not Currently    Drug use: No    Sexual activity: Never     Social Determinants of Health     Financial Resource Strain: High Risk    Difficulty of Paying Living Expenses: Hard   Food Insecurity: No Food Insecurity    Worried About Running Out of Food in the Last Year: Never true    Ran Out of Food in the Last Year: Never true   Transportation Needs: Unmet Transportation Needs    Lack of Transportation (Medical): Yes    Lack of Transportation  (Non-Medical): Yes   Physical Activity: Inactive    Days of Exercise per Week: 0 days    Minutes of Exercise per Session: 0 min   Stress: Stress Concern Present    Feeling of Stress : To some extent   Social Connections: Socially Isolated    Frequency of Communication with Friends and Family: More than three times a week    Frequency of Social Gatherings with Friends and Family: More than three times a week    Attends Tenriism Services: Never    Active Member of Clubs or Organizations: No    Attends Club or Organization Meetings: Never    Marital Status: Never    Housing Stability: High Risk    Unable to Pay for Housing in the Last Year: Yes    Unstable Housing in the Last Year: No     Review of Systems   Skin:  Positive for wound.   All other systems reviewed and are negative.  Objective:     Vital Signs (Most Recent):  Temp: 97.2 °F (36.2 °C) (11/21/22 0716)  Pulse: (!) 156 (11/21/22 0716)  Resp: 18 (11/21/22 0919)  BP: 138/83 (11/21/22 0716)  SpO2: 95 % (11/21/22 0716)   Vital Signs (24h Range):  Temp:  [97.2 °F (36.2 °C)-98 °F (36.7 °C)] 97.2 °F (36.2 °C)  Pulse:  [] 156  Resp:  [16-20] 18  SpO2:  [93 %-97 %] 95 %  BP: (124-174)/(76-91) 138/83     Weight: 114.1 kg (251 lb 8.7 oz)  Body mass index is 35.08 kg/m².    Estimated Creatinine Clearance: 127.9 mL/min (based on SCr of 0.7 mg/dL).    Physical Exam  Vitals and nursing note reviewed.   Constitutional:       Appearance: Normal appearance.   HENT:      Head: Normocephalic and atraumatic.      Right Ear: External ear normal.      Left Ear: External ear normal.   Eyes:      Extraocular Movements: Extraocular movements intact.      Conjunctiva/sclera: Conjunctivae normal.      Pupils: Pupils are equal, round, and reactive to light.   Cardiovascular:      Rate and Rhythm: Normal rate and regular rhythm.   Genitourinary:     Comments: Scrotum packed and dressed  Skin:     Findings: Erythema present. No rash.   Neurological:      General:  No focal deficit present.      Mental Status: He is alert and oriented to person, place, and time. Mental status is at baseline.   Psychiatric:         Mood and Affect: Mood normal.         Behavior: Behavior normal.         Thought Content: Thought content normal.         Judgment: Judgment normal.       Significant Labs: Blood Culture:   Recent Labs   Lab 11/15/22  1741 11/15/22  1742   LABBLOO No growth after 5 days. No growth after 5 days.     CBC:   Recent Labs   Lab 11/20/22  1238   WBC 9.71   HGB 14.3   HCT 44.3        Urine Culture: No results for input(s): LABURIN in the last 4320 hours.  Wound Culture:   Recent Labs   Lab 11/15/22  2139   LABAERO No growth     All pertinent labs within the past 24 hours have been reviewed.    Significant Imaging: I have reviewed all pertinent imaging results/findings within the past 24 hours.

## 2022-11-21 NOTE — ASSESSMENT & PLAN NOTE
68 yo man with RA admitted with scrotal abscess and Mary's after suffering a leg infection (left thigh) and failing outpatient abx  - s/p debridement x 2  - culture only growing Anaerococcus (though patient was on abx prior to admission)  - clinically improving  - ok to stop vancomycin and clindamycin  - continue cefepime and Flagyl for now  - will follow with you

## 2022-11-21 NOTE — HPI
67M h/o DM2, CAD, HTN, HLD presents with scrotal pain and swelling. Started about 4 weeks ago and has been progressively worsening despite a couple courses of antibiotics (most recently Bactrim). He reports fevers and chills at home. He also reports nausea, decreased PO intake, multiple syncopal episodes, and stool incontinence. He is on continuous blood glucose monitoring and states blood sugars have been in 190s today. He was seen PTA in urgent care and diagnosed with scrotal abscess with possible Mary's; he referred to the ER but waited to present. After taking a nap when getting his toothbrush, he woke up confused and in feces. He was hypotensive and tachycardic, on arrival in the ED. Sepsis protocol was started. Labs and imaging are noted c/w scrotal abscess and Mary's. Patient went to OR twice for debridement. Appropriate cultures were obtained and grew Anaerococcus. The patient is currently on cefepime, vancomycin, and Flagyl. ID is consulted for abx recommendations for Mary's.

## 2022-11-21 NOTE — PROGRESS NOTES
[facility] ([place])  Urology  Progress Note    Patient Name: Nithin Wagner  MRN: [id number]  Admission Date: 11/15/2022  Hospital Length of Stay: 6 days  Code Status: Full Code   Attending Provider: Darby Obrien MD   Primary Care Physician: Tushar Drew MD    Subjective:     HPI:  67M h/o DM2, CAD, HTN, HLD presents with scrotal pain and swelling. Started about 4 weeks ago and has been progressively worsening despite a couple courses of antibiotics (unsure which ones). He reports fevers and chills at home. He also reports nausea, decreased PO intake, multiple syncopal episodes, and stool incontinence. He is on continuous blood glucose monitoring and states blood sugars have been in 190s today. He was seen yesterday in urgent care and diagnosed with scrotal abscess with possible kami's; he referred to the ER but waited until today to come in.    He was hypotensive and tachycardic on arrival in the ED. Sepsis protocol was started. Labs and imaging are currently pending.         Interval History: NAEO. AFVSS. Denies chills. Loose stools. Has not ambulated. Good urine output. Labs pending.    Review of Systems  Objective:     Temp:  [97.8 °F (36.6 °C)-99.1 °F (37.3 °C)] 97.8 °F (36.6 °C)  Pulse:  [] 92  Resp:  [16-20] 18  SpO2:  [93 %-97 %] 95 %  BP: (124-174)/(76-91) 139/81     Body mass index is 35.08 kg/m².           Drains       Drain  Duration                  Urethral Catheter 11/15/22 2145 Non-latex 16 Fr. 5 days                    Physical Exam  Vitals and nursing note reviewed.   Constitutional:       General: He is not in acute distress.     Appearance: Normal appearance.   HENT:      Head: Atraumatic.      Nose: Nose normal.   Eyes:      Extraocular Movements: Extraocular movements intact.      Pupils: Pupils are equal, round, and reactive to light.   Cardiovascular:      Rate and Rhythm: Normal rate.   Pulmonary:      Effort: Pulmonary effort is normal.   Abdominal:      General: Abdomen  is flat. There is no distension.      Tenderness: There is no abdominal tenderness. There is no right CVA tenderness or left CVA tenderness.   Genitourinary:     Comments: Open wound with kerlex and ABD pads. Dressing clean and intact.  Skin edges stable; dark area on medial aspect of wound has not progressed    Musculoskeletal:         General: Normal range of motion.      Cervical back: Normal range of motion.   Neurological:      General: No focal deficit present.      Mental Status: He is alert and oriented to person, place, and time.   Psychiatric:         Mood and Affect: Mood normal.         Behavior: Behavior normal.       Significant Labs:    BMP:  Recent Labs   Lab 11/17/22  0357 11/18/22  0351 11/20/22  1238    139 140   K 4.5 4.3 4.4    105 104   CO2 26 28 28   BUN 31* 19 7*   CREATININE 0.9 0.8 0.7   CALCIUM 7.7* 7.6* 8.1*       CBC:   Recent Labs   Lab 11/17/22  0357 11/18/22  0351 11/20/22  1238   WBC 17.20* 7.90 9.71   HGB 13.8* 14.1 14.3   HCT 42.0 43.5 44.3    170 196       Urine Studies: No results for input(s): COLORU, APPEARANCEUA, PHUR, SPECGRAV, PROTEINUA, GLUCUA, KETONESU, BILIRUBINUA, OCCULTUA, NITRITE, UROBILINOGEN, LEUKOCYTESUR, RBCUA, WBCUA, BACTERIA, SQUAMEPITHEL, HYALINECASTS in the last 168 hours.    Invalid input(s): WRIGHTSUR    Significant Imaging:  All pertinent imaging results/findings from the past 24 hours have been reviewed.        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 S/p excision and debridement of scrotum 11/15/22 and 11/17/22.    - Admitted to hospital medicine, appreciate assistance  - Second debridement 11/17 in OR. Clean wound edges. Medial aspect stable.  - Diet, insulin and pain control per primary  - PT/OT  - Ambulate QID    - Continue broad spectrum antibiotics  - Anaerobic culture with anaerococcus murdochii  - Will consult ID today, appreciate recs  - Dressing changes BID; will ask nursing  staff to change dressing with wound care is not available; spoke with Mr Wagner and he agrees to this plan  - Rest of care per primary     follow up wound care recs    Scrotal edema  -- No evidence of abscess or Mary's gangrene on physical exam  -- CT scan currently pending - will review once complete and update plan accordingly        VTE Risk Mitigation (From admission, onward)           Ordered     IP VTE HIGH RISK PATIENT  Once         11/15/22 1948     Reason for No Pharmacological VTE Prophylaxis  Once        Question:  Reasons:  Answer:  Risk of Bleeding    11/15/22 1948     Place sequential compression device  Until discontinued         11/15/22 1936                    Ryley Celaya MD  Urology  [facility] ([place])

## 2022-11-21 NOTE — PT/OT/SLP EVAL
Physical Therapy Evaluation    Patient Name:  Nithin Wagner   MRN:  [id number]    Recommendations:     Discharge Recommendations:  home with home health   Discharge Equipment Recommendations: none   Barriers to discharge: None    Assessment:     Nithin Wagner is a 67 y.o. male admitted with a medical diagnosis of Sepsis.  He presents with the following impairments/functional limitations:  pain, edema, impaired skin.    Patient evaluated by PT and goals established. Pt demonstrates ability to conduct bed mobility and transfer with mod indep using bed rail at this time. Demonstrated no LOB or near falls with minimal ambulation in the room and no AD required at this time. PT to formally assess gait at next visit. Strength WFL. PT rec for home with home health upon safe d/c from hospital stay. PT will continue to follow and progress as tolerated.     Rehab Prognosis: Good; patient would benefit from acute skilled PT services to address these deficits and reach maximum level of function.    Recent Surgery: Procedure(s) (LRB):  INCISION AND DRAINAGE, SCROTUM (N/A) 4 Days Post-Op    Plan:     During this hospitalization, patient to be seen 3 x/week (QID) to address the identified rehab impairments via gait training, therapeutic activities, therapeutic exercises, neuromuscular re-education and progress toward the following goals:    Plan of Care Expires:  12/21/22    Subjective     Chief Complaint: post surgical pain in scrotum  Patient/Family Comments/goals:   Pain/Comfort:  Pain Rating 1: other (see comments) (score not verbalized)  Location 1: scrotum  Pain Addressed 1: Pre-medicate for activity, Reposition, Distraction, Cessation of Activity  Pain Rating Post-Intervention 1: other (see comments) (no worsening of pain noted)    Patients cultural, spiritual, Sikhism conflicts given the current situation: no    Living Environment:  Lives in a duplex with 4 steps to enter with railings and bedroom is on 2nd floor; 1 flight  "of stairs "I think 10 steps"; stairs inside also have railing     Prior to admission, patients level of function was mod indep using cane sometimes in the past year.  Equipment used at home: oxygen.  DME owned (not currently used): none.  Upon discharge, patient will have assistance from self.    Objective:     Communicated with nurse Barbour prior to session.  Patient found HOB elevated with mead catheter, oxygen, peripheral IV  upon PT entry to room.    General Precautions: Standard,     Orthopedic Precautions:N/A   Braces: N/A  Respiratory Status: Room air    Exams:  Cognitive Exam:  Patient is oriented to Person, Place, Time, and Situation  Gross Motor Coordination:  WFL  Sensation:    -       Intact to bilat LEs  RLE ROM: WFL  RLE Strength: WFL  LLE ROM: WFL  LLE Strength: WFL      Functional Mobility:  Bed Mobility:     Supine to Sit: modified independence with use of bed rail and stand by assistance  Transfers:     Sit to Stand:  2 trials of stand by assistance with no AD   Gait: ambulated 5 ft in room with no AD and SBA; formal gait to be assessed at next visit; no LOB or near falls noted during session  Balance: demonstrated good Single leg balance with static standing with use of one UE on bed while conducting self nini-hygiene; SBA provided       AM-PAC 6 CLICK MOBILITY  Total Score:22       Treatment & Education:    PT educated patient re:   PT plan of care/role of PT  Safety with OOB mobility  Use of AD at home if pt feels unsteady or lacking efficient balance upon return home  Discharge disposition    Pt verbalized understanding      Patient left sitting edge of bed with all lines intact, call button in reach, and nurse Angle notified.    GOALS:   Multidisciplinary Problems       Physical Therapy Goals          Problem: Physical Therapy    Goal Priority Disciplines Outcome Goal Variances Interventions   Physical Therapy Goal     PT, PT/OT Ongoing, Progressing     Description: Goals to be met by: " 22    Patient will increase functional independence with mobility by performin. Supine<>sit with SPV and 1 UE if needed.   2. Sit<>stand with LRAD with SPV.  3. Gait x 100 feet with LRAD with SBA.  4. Ascend/descend 10 step(s) with least restrictive assistive device and SBA.                        History:     Past Medical History:   Diagnosis Date    Arthritis     Atrial myxoma     CHF (congestive heart failure)     Coronary atherosclerosis     Diabetes mellitus, type 2     Difficult intubation     Hepatitis B     Hyperlipidemia     Hypertension     Non-alcoholic fatty liver disease     Rheumatoid arthritis     Rheumatoid arthritis flare 2021    Stroke     TIA    Systolic heart failure        Past Surgical History:   Procedure Laterality Date    CORONARY ANGIOGRAPHY N/A 3/10/2021    Procedure: ANGIOGRAM, CORONARY ARTERY - right radial;  Surgeon: Shemar Dempsey MD;  Location: [facility] CATH LAB;  Service: Cardiology;  Laterality: N/A;    CORONARY STENT PLACEMENT  03/10/2021    prox-mid RCA Marshal 4.5 x 26 mm, 4.5 x 12 mm    INCISION AND DRAINAGE OF SCROTUM N/A 11/15/2022    Procedure: INCISION AND DRAINAGE, SCROTUM;  Surgeon: William Hatch MD;  Location: [facility] OR;  Service: Urology;  Laterality: N/A;    INCISION AND DRAINAGE OF SCROTUM N/A 2022    Procedure: INCISION AND DRAINAGE, SCROTUM;  Surgeon: William Hatch MD;  Location: [facility];  Service: Urology;  Laterality: N/A;    LUNG LOBECTOMY Right 2008    RUL lobectomy after removal of atrial myxoma    PLEURA BIOPSY      RESECTION OF ATRIAL MYXOMA         Time Tracking:     PT Received On: 22  PT Start Time: 917     PT Stop Time: 945  PT Total Time (min): 28 min     Billable Minutes: Evaluation 15 and Therapeutic Activity 13      2022

## 2022-11-21 NOTE — SUBJECTIVE & OBJECTIVE
Interval History: NAEO. AFVSS. Denies chills. Loose stools. Has not ambulated. Good urine output. Labs pending.    Review of Systems  Objective:     Temp:  [97.8 °F (36.6 °C)-99.1 °F (37.3 °C)] 97.8 °F (36.6 °C)  Pulse:  [] 92  Resp:  [16-20] 18  SpO2:  [93 %-97 %] 95 %  BP: (124-174)/(76-91) 139/81     Body mass index is 35.08 kg/m².           Drains       Drain  Duration                  Urethral Catheter 11/15/22 2145 Non-latex 16 Fr. 5 days                    Physical Exam  Vitals and nursing note reviewed.   Constitutional:       General: He is not in acute distress.     Appearance: Normal appearance.   HENT:      Head: Atraumatic.      Nose: Nose normal.   Eyes:      Extraocular Movements: Extraocular movements intact.      Pupils: Pupils are equal, round, and reactive to light.   Cardiovascular:      Rate and Rhythm: Normal rate.   Pulmonary:      Effort: Pulmonary effort is normal.   Abdominal:      General: Abdomen is flat. There is no distension.      Tenderness: There is no abdominal tenderness. There is no right CVA tenderness or left CVA tenderness.   Genitourinary:     Comments: Open wound with kerlex and ABD pads. Dressing clean and intact.  Skin edges stable; dark area on medial aspect of wound has not progressed    Musculoskeletal:         General: Normal range of motion.      Cervical back: Normal range of motion.   Neurological:      General: No focal deficit present.      Mental Status: He is alert and oriented to person, place, and time.   Psychiatric:         Mood and Affect: Mood normal.         Behavior: Behavior normal.       Significant Labs:    BMP:  Recent Labs   Lab 11/17/22  0357 11/18/22  0351 11/20/22  1238    139 140   K 4.5 4.3 4.4    105 104   CO2 26 28 28   BUN 31* 19 7*   CREATININE 0.9 0.8 0.7   CALCIUM 7.7* 7.6* 8.1*       CBC:   Recent Labs   Lab 11/17/22  0357 11/18/22  0351 11/20/22  1238   WBC 17.20* 7.90 9.71   HGB 13.8* 14.1 14.3   HCT 42.0 43.5 44.3     170 196       Urine Studies: No results for input(s): COLORU, APPEARANCEUA, PHUR, SPECGRAV, PROTEINUA, GLUCUA, KETONESU, BILIRUBINUA, OCCULTUA, NITRITE, UROBILINOGEN, LEUKOCYTESUR, RBCUA, WBCUA, BACTERIA, SQUAMEPITHEL, HYALINECASTS in the last 168 hours.    Invalid input(s): WRIGHTSUR    Significant Imaging:  All pertinent imaging results/findings from the past 24 hours have been reviewed.

## 2022-11-21 NOTE — PLAN OF CARE
Problem: Physical Therapy  Goal: Physical Therapy Goal  Description: Goals to be met by: 22    Patient will increase functional independence with mobility by performin. Supine<>sit with SPV and 1 UE if needed.   2. Sit<>stand with LRAD with SPV.  3. Gait x 100 feet with LRAD with SBA.  4. Ascend/descend 10 step(s) with least restrictive assistive device and SBA.   Outcome: Ongoing, Progressing     Patient evaluated by PT and goals established. Pt demonstrates ability to conduct bed mobility and transfer with mod indep using bed rail at this time. Demonstrated no LOB or near falls with minimal ambulation in the room and no AD required at this time. PT to formally assess gait at next visit. Strength WFL. PT rec for home with home health upon safe d/c from hospital stay. PT will continue to follow and progress as tolerated. Please see progress note for detailed plan of care and recommendations.

## 2022-11-22 ENCOUNTER — PATIENT OUTREACH (OUTPATIENT)
Dept: ADMINISTRATIVE | Facility: OTHER | Age: 67
End: 2022-11-22
Payer: MEDICARE

## 2022-11-22 DIAGNOSIS — I10 ESSENTIAL HYPERTENSION: Primary | ICD-10-CM

## 2022-11-22 PROBLEM — N50.89 SCROTAL EDEMA: Status: RESOLVED | Noted: 2022-11-15 | Resolved: 2022-11-22

## 2022-11-22 LAB
POCT GLUCOSE: 144 MG/DL (ref 70–110)
POCT GLUCOSE: 193 MG/DL (ref 70–110)
POCT GLUCOSE: 196 MG/DL (ref 70–110)

## 2022-11-22 PROCEDURE — 25000003 PHARM REV CODE 250: Performed by: UROLOGY

## 2022-11-22 PROCEDURE — 97116 GAIT TRAINING THERAPY: CPT

## 2022-11-22 PROCEDURE — 63600175 PHARM REV CODE 636 W HCPCS: Performed by: HOSPITALIST

## 2022-11-22 PROCEDURE — 99233 PR SUBSEQUENT HOSPITAL CARE,LEVL III: ICD-10-PCS | Mod: ,,, | Performed by: HOSPITALIST

## 2022-11-22 PROCEDURE — 25000003 PHARM REV CODE 250: Performed by: INTERNAL MEDICINE

## 2022-11-22 PROCEDURE — 99232 PR SUBSEQUENT HOSPITAL CARE,LEVL II: ICD-10-PCS | Mod: GC,,, | Performed by: UROLOGY

## 2022-11-22 PROCEDURE — 11000001 HC ACUTE MED/SURG PRIVATE ROOM

## 2022-11-22 PROCEDURE — 99232 SBSQ HOSP IP/OBS MODERATE 35: CPT | Mod: GC,,, | Performed by: UROLOGY

## 2022-11-22 PROCEDURE — 25000003 PHARM REV CODE 250: Performed by: NURSE PRACTITIONER

## 2022-11-22 PROCEDURE — 63600175 PHARM REV CODE 636 W HCPCS: Performed by: INTERNAL MEDICINE

## 2022-11-22 PROCEDURE — 25000003 PHARM REV CODE 250: Performed by: HOSPITALIST

## 2022-11-22 PROCEDURE — 99233 SBSQ HOSP IP/OBS HIGH 50: CPT | Mod: ,,, | Performed by: HOSPITALIST

## 2022-11-22 PROCEDURE — 25000003 PHARM REV CODE 250: Performed by: STUDENT IN AN ORGANIZED HEALTH CARE EDUCATION/TRAINING PROGRAM

## 2022-11-22 PROCEDURE — S0030 INJECTION, METRONIDAZOLE: HCPCS | Performed by: UROLOGY

## 2022-11-22 RX ORDER — METRONIDAZOLE 500 MG/1
500 TABLET ORAL EVERY 8 HOURS
Status: DISCONTINUED | OUTPATIENT
Start: 2022-11-22 | End: 2022-12-01 | Stop reason: HOSPADM

## 2022-11-22 RX ORDER — MORPHINE SULFATE 4 MG/ML
4 INJECTION, SOLUTION INTRAMUSCULAR; INTRAVENOUS ONCE
Status: COMPLETED | OUTPATIENT
Start: 2022-11-22 | End: 2022-11-22

## 2022-11-22 RX ORDER — LIDOCAINE HYDROCHLORIDE 10 MG/ML
20 INJECTION INFILTRATION; PERINEURAL ONCE
Status: COMPLETED | OUTPATIENT
Start: 2022-11-22 | End: 2022-11-22

## 2022-11-22 RX ADMIN — METRONIDAZOLE 500 MG: 500 INJECTION, SOLUTION INTRAVENOUS at 06:11

## 2022-11-22 RX ADMIN — CEFEPIME HYDROCHLORIDE 1 G: 1 INJECTION, SOLUTION INTRAVENOUS at 08:11

## 2022-11-22 RX ADMIN — EZETIMIBE 10 MG: 10 TABLET ORAL at 08:11

## 2022-11-22 RX ADMIN — ACETAMINOPHEN 650 MG: 325 TABLET, FILM COATED ORAL at 08:11

## 2022-11-22 RX ADMIN — MORPHINE SULFATE 6 MG: 2 INJECTION, SOLUTION INTRAMUSCULAR; INTRAVENOUS at 08:11

## 2022-11-22 RX ADMIN — MORPHINE SULFATE 6 MG: 2 INJECTION, SOLUTION INTRAMUSCULAR; INTRAVENOUS at 06:11

## 2022-11-22 RX ADMIN — CEFEPIME HYDROCHLORIDE 1 G: 1 INJECTION, SOLUTION INTRAVENOUS at 01:11

## 2022-11-22 RX ADMIN — FOLIC ACID 1 MG: 1 TABLET ORAL at 08:11

## 2022-11-22 RX ADMIN — DIGOXIN 125 MCG: 125 TABLET ORAL at 08:11

## 2022-11-22 RX ADMIN — THERA TABS 1 TABLET: TAB at 08:11

## 2022-11-22 RX ADMIN — CEFEPIME HYDROCHLORIDE 1 G: 1 INJECTION, SOLUTION INTRAVENOUS at 04:11

## 2022-11-22 RX ADMIN — NYSTATIN 500000 UNITS: 500000 SUSPENSION ORAL at 09:11

## 2022-11-22 RX ADMIN — METRONIDAZOLE 500 MG: 500 TABLET ORAL at 09:11

## 2022-11-22 RX ADMIN — NYSTATIN 500000 UNITS: 500000 SUSPENSION ORAL at 02:11

## 2022-11-22 RX ADMIN — METRONIDAZOLE 500 MG: 500 TABLET ORAL at 02:11

## 2022-11-22 RX ADMIN — OXYCODONE HYDROCHLORIDE AND ACETAMINOPHEN 1 TABLET: 5; 325 TABLET ORAL at 04:11

## 2022-11-22 RX ADMIN — MORPHINE SULFATE 4 MG: 4 INJECTION, SOLUTION INTRAMUSCULAR; INTRAVENOUS at 12:11

## 2022-11-22 RX ADMIN — OXYCODONE 15 MG: 5 TABLET ORAL at 02:11

## 2022-11-22 RX ADMIN — OXYCODONE 15 MG: 5 TABLET ORAL at 08:11

## 2022-11-22 RX ADMIN — LIDOCAINE HYDROCHLORIDE 20 ML: 10 INJECTION, SOLUTION INFILTRATION; PERINEURAL at 02:11

## 2022-11-22 RX ADMIN — ASPIRIN 81 MG CHEWABLE TABLET 81 MG: 81 TABLET CHEWABLE at 08:11

## 2022-11-22 RX ADMIN — NYSTATIN 500000 UNITS: 500000 SUSPENSION ORAL at 04:11

## 2022-11-22 RX ADMIN — NYSTATIN 500000 UNITS: 500000 SUSPENSION ORAL at 08:11

## 2022-11-22 RX ADMIN — INSULIN DETEMIR 18 UNITS: 100 INJECTION, SOLUTION SUBCUTANEOUS at 08:11

## 2022-11-22 NOTE — PT/OT/SLP PROGRESS
"Physical Therapy Treatment    Patient Name:  Nithin Wagner   MRN:  [id number]    Recommendations:     Discharge Recommendations:  home with home health   Discharge Equipment Recommendations: none   Barriers to discharge: Decreased caregiver support    Assessment:     Nithin Wagner is a 67 y.o. male admitted with a medical diagnosis of Sepsis.  He presents with the following impairments/functional limitations:  weakness, impaired endurance, gait instability, impaired functional mobility, decreased lower extremity function, impaired balance, impaired cardiopulmonary response to activity .    Patient tolerated today's session well. Patient ambulated 90 feet with no AD and CGA. Patient reports he feels weaker and more unstable compared to prior to admission into the hospital. The patient will benefit from continued physical therapy intervention to address remaining functional mobility deficits.     Rehab Prognosis: Good; patient would benefit from acute skilled PT services to address these deficits and reach maximum level of function.    Recent Surgery: Procedure(s) (LRB):  INCISION AND DRAINAGE, SCROTUM (N/A) 5 Days Post-Op    Plan:     During this hospitalization, patient to be seen 3 x/week (QID) to address the identified rehab impairments via gait training, therapeutic activities, therapeutic exercises and progress toward the following goals:    Plan of Care Expires:  12/21/22    Subjective     Chief Complaint: "I am just in a lot of pain"  Patient/Family Comments/goals: Patient agrees to participate in PT intervention.   Pain/Comfort:  Pain Rating 1: 8/10  Location 1: scrotum  Pain Addressed 1: Distraction, Cessation of Activity, Reposition      Objective:     Communicated with nursing prior to session.  Patient found sitting edge of bed with peripheral IV, oxygen upon PT entry to room.     General Precautions: Standard, fall   Orthopedic Precautions:N/A   Braces: N/A  Respiratory Status: Nasal cannula, flow 2 L/min   "   Functional Mobility:  Transfers:     Sit to Stand:  stand by assistance with no AD  Gait: patient ambulated 90 feet with no AD with CGA. Patient pushes Iv poll with him independently.       AM-PAC 6 CLICK MOBILITY  Turning over in bed (including adjusting bedclothes, sheets and blankets)?: 4  Sitting down on and standing up from a chair with arms (e.g., wheelchair, bedside commode, etc.): 3  Moving from lying on back to sitting on the side of the bed?: 4  Moving to and from a bed to a chair (including a wheelchair)?: 3  Need to walk in hospital room?: 3  Climbing 3-5 steps with a railing?: 3  Basic Mobility Total Score: 20       Treatment & Education:    Gait: patient with decreased BLE foot clearance. Impaired step length, gilberto and veleocity.     Patient left sitting edge of bed with all lines intact and call button in reach..    GOALS:   Multidisciplinary Problems       Physical Therapy Goals          Problem: Physical Therapy    Goal Priority Disciplines Outcome Goal Variances Interventions   Physical Therapy Goal     PT, PT/OT Ongoing, Progressing     Description: Goals to be met by: 22    Patient will increase functional independence with mobility by performin. Supine<>sit with SPV and 1 UE if needed.   2. Sit<>stand with LRAD with SPV.  3. Gait x 100 feet with LRAD with SBA.  4. Ascend/descend 10 step(s) with least restrictive assistive device and SBA.                        Time Tracking:     PT Received On: 22  PT Start Time: 1608     PT Stop Time: 1631  PT Total Time (min): 23 min     Billable Minutes: Gait Training 23    Treatment Type: Treatment  PT/PTA: PT     PTA Visit Number: 0     2022

## 2022-11-22 NOTE — PLAN OF CARE
During my shift, pt AAOx4, NAD. VS stable. Pt remains afebrile. SPO2 wnl on 2L of humidified O2 via NC. Pt reports scrotal pain that has been managed Percocet PO, Percolone PO and Morphine via IV. IV abx therapy in progress. No adverse reaction noted. Wound dressing change done as ordered. Pt tolerated well. Frequent soft, formed stools noted today. Indwelling mead catheter removed as ordered. Pt tolerated well. Void trial to begin at 1730.Pt tolerating diabetic diet. CBG readings via pt's dexcom wnl. No sliding scale insulin coverage needed. Pt remains free from calls or injury. Bed is lowered and locked. Call light is within reach. Pt has no known needs at this time.

## 2022-11-22 NOTE — SUBJECTIVE & OBJECTIVE
Interval History: No acute event, reports pain with dressing changes that are now controlled, overall feeling better; no significant change.    Review of Systems   Constitutional:  Negative for chills and fever.   Respiratory:  Negative for cough and shortness of breath.    Cardiovascular:  Negative for chest pain and palpitations.   Genitourinary:  Positive for scrotal swelling.   Objective:     Vital Signs (Most Recent):  Temp: 98.5 °F (36.9 °C) (11/22/22 1149)  Pulse: 96 (11/22/22 1149)  Resp: 16 (11/22/22 1431)  BP: 135/76 (11/22/22 1149)  SpO2: 98 % (11/22/22 1149)   Vital Signs (24h Range):  Temp:  [97.4 °F (36.3 °C)-98.5 °F (36.9 °C)] 98.5 °F (36.9 °C)  Pulse:  [] 96  Resp:  [16-20] 16  SpO2:  [94 %-98 %] 98 %  BP: (133-143)/(74-82) 135/76     Weight: 114.1 kg (251 lb 8.7 oz)  Body mass index is 35.08 kg/m².    Intake/Output Summary (Last 24 hours) at 11/22/2022 1553  Last data filed at 11/22/2022 1200  Gross per 24 hour   Intake 240 ml   Output 550 ml   Net -310 ml        Physical Exam  Constitutional:       General: He is sleeping.      Appearance: He is well-developed. He is obese.   HENT:      Head: Normocephalic and atraumatic.      Nose: Nose normal.   Eyes:      Extraocular Movements: Extraocular movements intact.      Conjunctiva/sclera: Conjunctivae normal.   Cardiovascular:      Rate and Rhythm: Normal rate and regular rhythm.      Pulses:           Radial pulses are 2+ on the right side and 2+ on the left side.      Heart sounds: Normal heart sounds.   Pulmonary:      Effort: Pulmonary effort is normal.      Breath sounds: Normal breath sounds.   Abdominal:      General: Bowel sounds are normal. There is no distension.      Palpations: Abdomen is soft.      Tenderness: There is no abdominal tenderness. There is no guarding or rebound.   Musculoskeletal:         General: Normal range of motion.      Cervical back: Normal range of motion and neck supple.   Skin:     General: Skin is warm and  dry.      Comments: Packing noted to left scrotum   Neurological:      Mental Status: He is oriented to person, place, and time.      Motor: Motor function is intact.   Psychiatric:         Mood and Affect: Mood normal.         Behavior: Behavior normal.         Thought Content: Thought content normal.       Significant Labs: All pertinent labs within the past 24 hours have been reviewed.    Significant Imaging: I have reviewed all pertinent imaging results/findings within the past 24 hours.

## 2022-11-22 NOTE — PROGRESS NOTES
[facility] Medicine  Progress Note    Patient Name: Nithin Wagner  MRN: [id number]  Patient Class: IP- Inpatient   Admission Date: 11/15/2022  Length of Stay: 6 days  Attending Physician: Darby Obrien MD  Primary Care Provider: Tushar Drew MD        Subjective:     Principal Problem:Sepsis        HPI:  The patient is a 67M with a past medical history of DM2, CAD, HTN, and HLD who presents with scrotal pain and swelling. Started about 4 weeks ago and has been progressively worsening despite a couple courses of antibiotics (unsure which ones). He reports fevers and chills at home, nausea, decreased PO intake, and multiple syncopal episodes with +incontinence of stool. Unable to urinate for the past 24 hours or so. Sugars 190s at home. He was seen yesterday in urgent care and referred to the ER, but says he wasn't able to make it at that time so he comes today instead. On initial workup, the patient has CT evidence of Fourniers gangrene.  He will be brought to the operating room emergently and admitted after to ICU for further management.      Overview/Hospital Course:  Mr. Wagner presented with scrotal pain and swelling.  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  Empiric initiated with cefepime, clindamycin, metronidazole, and vancomycin.  Urology consulted and he underwent emergent excision and debridement of necrotic tissue on 11/15. Repeat washout performed 11/17. Improved and stepped down from ICU 11/18.      Interval History: No acute event, reports pain with dressing changes that are now controlled, overall feeling better.    Review of Systems   Constitutional:  Negative for chills and fever.   Respiratory:  Negative for cough and shortness of breath.    Cardiovascular:  Negative for chest pain and palpitations.   Genitourinary:  Positive for scrotal swelling.   Objective:     Vital Signs (Most Recent):  Temp: 98.4 °F (36.9 °C) (11/21/22 2003)  Pulse: 95 (11/21/22  2003)  Resp: 20 (11/21/22 2117)  BP: (!) 143/81 (11/21/22 2003)  SpO2: 97 % (11/21/22 2003)   Vital Signs (24h Range):  Temp:  [97.2 °F (36.2 °C)-98.4 °F (36.9 °C)] 98.4 °F (36.9 °C)  Pulse:  [0-156] 95  Resp:  [16-20] 20  SpO2:  [94 %-97 %] 97 %  BP: (133-174)/(79-91) 143/81     Weight: 114.1 kg (251 lb 8.7 oz)  Body mass index is 35.08 kg/m².    Intake/Output Summary (Last 24 hours) at 11/21/2022 2226  Last data filed at 11/21/2022 2007  Gross per 24 hour   Intake 287.74 ml   Output 2400 ml   Net -2112.26 ml        Physical Exam  Constitutional:       General: He is sleeping.      Appearance: He is well-developed. He is obese.   HENT:      Head: Normocephalic and atraumatic.      Nose: Nose normal.   Eyes:      Extraocular Movements: Extraocular movements intact.      Conjunctiva/sclera: Conjunctivae normal.   Cardiovascular:      Rate and Rhythm: Normal rate and regular rhythm.      Pulses:           Radial pulses are 2+ on the right side and 2+ on the left side.      Heart sounds: Normal heart sounds.   Pulmonary:      Effort: Pulmonary effort is normal.      Breath sounds: Normal breath sounds.   Abdominal:      General: Bowel sounds are normal. There is no distension.      Palpations: Abdomen is soft.      Tenderness: There is no abdominal tenderness. There is no guarding or rebound.   Musculoskeletal:         General: Normal range of motion.      Cervical back: Normal range of motion and neck supple.   Skin:     General: Skin is warm and dry.      Comments: Packing noted to left scrotum   Neurological:      Mental Status: He is oriented to person, place, and time.      Motor: Motor function is intact.   Psychiatric:         Mood and Affect: Mood normal.         Behavior: Behavior normal.         Thought Content: Thought content normal.       Significant Labs: All pertinent labs within the past 24 hours have been reviewed.    Significant Imaging: I have reviewed all pertinent imaging results/findings within  the past 24 hours.      Assessment/Plan:      * Sepsis  Mary's Gangrene of Scrotum  Leukocytosis  Lactic acidosis  Elevated troponin  - Patient criteria for sepsis with infective focus of Mary gangrene of the scrotum  - Lactic acid level 4.4, procalcitonin >48, WBC 24, and elevated troponin levels that trended down  - Urology consulted and patient underwent excision and debridement of necrotic scrotal tissue on 11/15; repeat debridement 11/17.  - Continue cefepime 1g IV q.12, clindamycin 900 mg IV Q 8, metronidazole 500 mg IV Q 8 and vancomycin dosed by pharmacy  - Elevated troponins likely secondary to sepsis and setting of DONNA given no reported chest pain and no acute EKG changes  - Blood cultures no growth to date and wound cultures with Anaerococcus species  - Wound care ordered and Urology following  - Clinically improving with resolution of lactic acidosis and definitive debridement  - Appreciate Urology assistance.  - Adjust pain medication regimen with addition of medication for breakthrough pain  - ID consult appreciated, and clindamycin and vancomycin stopped  - Await sensitivities    DONNA (acute kidney injury)  - Creatinine 2.5, baseline- 1.3; likely due to hypotension/sepsis  - Improved with IV fluids and treatment of sepsis  - Creatinine down to normal levels  - Continue monitor with repeat labs and follow I/O's    Chronic diastolic heart failure  CAD  - Last echo with normal EF and grade 1 diastolic dysfunction on 10/28/2022  - No overt signs of volume overload and no reported chest pain  - Elevated troponin due to sepsis with associated hypotension along with DONNA and CHF which were trended down and no ectopy noted on telemetry, and no acute EKG changes  - Continue digoxin 125 mcg p.o. daily, ASA 81 mg p.o. daily and ezetimibe 10 mg p.o. 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  - Glucoses levels reviewed, are at targets  - Continue detemir 18U subQ daily, and moderate-dose sliding scale insulin aspart 1-10U subQ TIDWM PRN.  - Will adjust insulin regimen as needed to achieve glucose between 140 - 180    Essential hypertension  - Hypotensive was on admit that responded to treatment with IV fluids with no need for pressor support  - Blood pressure predominantly in normal range; monitor.  - Continue to hold antihypertensives for now.      VTE Risk Mitigation (From admission, onward)         Ordered     IP VTE HIGH RISK PATIENT  Once         11/15/22 1948     Reason for No Pharmacological VTE Prophylaxis  Once        Question:  Reasons:  Answer:  Risk of Bleeding    11/15/22 1948     Place sequential compression device  Until discontinued         11/15/22 1936                      Darby Obrien MD  Department of Hospital Medicine   [facility] ([place])  "

## 2022-11-22 NOTE — PLAN OF CARE
Problem: Adult Inpatient Plan of Care  Goal: Plan of Care Review  Outcome: Ongoing, Progressing  Goal: Patient-Specific Goal (Individualized)  Outcome: Ongoing, Progressing  Goal: Absence of Hospital-Acquired Illness or Injury  Outcome: Ongoing, Progressing  Goal: Optimal Comfort and Wellbeing  Outcome: Ongoing, Progressing  Goal: Readiness for Transition of Care  Outcome: Ongoing, Progressing     Problem: Glycemic Control Impaired (Sepsis/Septic Shock)  Goal: Blood Glucose Level Within Desired Range  Outcome: Ongoing, Progressing     Problem: Infection Progression (Sepsis/Septic Shock)  Goal: Absence of Infection Signs and Symptoms  Outcome: Ongoing, Progressing     Problem: Diabetes Comorbidity  Goal: Blood Glucose Level Within Targeted Range  Outcome: Ongoing, Progressing

## 2022-11-22 NOTE — PROGRESS NOTES
[facility] ([place])  Urology  Progress Note    Patient Name: Nithin Wagner  MRN: [id number]  Admission Date: 11/15/2022  Hospital Length of Stay: 7 days  Code Status: Full Code   Attending Provider: Darby Obrien MD   Primary Care Physician: Tushar Drew MD    Subjective:     HPI:  67M h/o DM2, CAD, HTN, HLD presents with scrotal pain and swelling. Started about 4 weeks ago and has been progressively worsening despite a couple courses of antibiotics (unsure which ones). He reports fevers and chills at home. He also reports nausea, decreased PO intake, multiple syncopal episodes, and stool incontinence. He is on continuous blood glucose monitoring and states blood sugars have been in 190s today. He was seen yesterday in urgent care and diagnosed with scrotal abscess with possible kami's; he referred to the ER but waited until today to come in.    He was hypotensive and tachycardic on arrival in the ED. Sepsis protocol was started. Labs and imaging are currently pending.         Interval History: No acute events overnight. Patient comfortable and denies pain. Ricketts removed yesterday and patient voiding. Afebrile, vital signs stable.    Objective:     Temp:  [97.5 °F (36.4 °C)-98.4 °F (36.9 °C)] 98 °F (36.7 °C)  Pulse:  [0-103] 103  Resp:  [16-20] 20  SpO2:  [94 %-97 %] 94 %  BP: (133-143)/(74-82) 139/82     Body mass index is 35.08 kg/m².           Drains       Drain  Duration                  Urethral Catheter 11/15/22 2145 Non-latex 16 Fr. 5 days                    Physical Exam  Vitals and nursing note reviewed.   Constitutional:       General: He is not in acute distress.     Appearance: Normal appearance.   HENT:      Head: Atraumatic.      Nose: Nose normal.   Eyes:      Extraocular Movements: Extraocular movements intact.      Pupils: Pupils are equal, round, and reactive to light.   Cardiovascular:      Rate and Rhythm: Normal rate.   Pulmonary:      Effort: Pulmonary effort is normal.   Abdominal:       General: Abdomen is flat. There is no distension.      Tenderness: There is no abdominal tenderness. There is no right CVA tenderness or left CVA tenderness.   Genitourinary:     Comments: Open wound with kerlex and ABD pads. Dressing clean and intact.  Skin edges stable; enlarging eschar medial aspect of wound    Musculoskeletal:         General: Normal range of motion.      Cervical back: Normal range of motion.   Neurological:      General: No focal deficit present.      Mental Status: He is alert and oriented to person, place, and time.   Psychiatric:         Mood and Affect: Mood normal.         Behavior: Behavior normal.       Significant Labs:    BMP:  Recent Labs   Lab 11/17/22  0357 11/18/22  0351 11/20/22  1238    139 140   K 4.5 4.3 4.4    105 104   CO2 26 28 28   BUN 31* 19 7*   CREATININE 0.9 0.8 0.7   CALCIUM 7.7* 7.6* 8.1*         CBC:   Recent Labs   Lab 11/17/22 0357 11/18/22  0351 11/20/22  1238   WBC 17.20* 7.90 9.71   HGB 13.8* 14.1 14.3   HCT 42.0 43.5 44.3    170 196         Urine Studies: No results for input(s): COLORU, APPEARANCEUA, PHUR, SPECGRAV, PROTEINUA, GLUCUA, KETONESU, BILIRUBINUA, OCCULTUA, NITRITE, UROBILINOGEN, LEUKOCYTESUR, RBCUA, WBCUA, BACTERIA, SQUAMEPITHEL, HYALINECASTS in the last 168 hours.    Invalid input(s): WRIGHTSUR    Significant Imaging:  All pertinent imaging results/findings from the past 24 hours have been reviewed.        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 S/p excision and debridement of scrotum 11/15/22 and 11/17/22.    - Admitted to hospital medicine, appreciate assistance  - Second debridement 11/17 in OR. Medial aspect w/ enlarging eschar.  - Will bedside debride medial eschar today.  - ok for diet.  - Ricketts removed 11/21. Patient voiding.  - Diet, insulin and pain control per primary    - Continue antibiotics per infectious disease recs. On cefepime/flagyl.  - Anaerobic culture  with Anaerococcus murdochii and Prevotella bucchalis.  - Dressing changes BID. Wound care following.  - Rest of care per primary      VTE Risk Mitigation (From admission, onward)           Ordered     IP VTE HIGH RISK PATIENT  Once         11/15/22 1948     Reason for No Pharmacological VTE Prophylaxis  Once        Question:  Reasons:  Answer:  Risk of Bleeding    11/15/22 1948     Place sequential compression device  Until discontinued         11/15/22 1936                    Jarred Zamora MD  Urology  [facility])

## 2022-11-22 NOTE — PROGRESS NOTES
[facility] Medicine  Progress Note    Patient Name: Nithin Wagner  MRN: [id number]  Patient Class: IP- Inpatient   Admission Date: 11/15/2022  Length of Stay: 7 days  Attending Physician: Darby Obrien MD  Primary Care Provider: Tushar Drew MD        Subjective:     Principal Problem:Sepsis        HPI:  The patient is a 67M with a past medical history of DM2, CAD, HTN, and HLD who presents with scrotal pain and swelling. Started about 4 weeks ago and has been progressively worsening despite a couple courses of antibiotics (unsure which ones). He reports fevers and chills at home, nausea, decreased PO intake, and multiple syncopal episodes with +incontinence of stool. Unable to urinate for the past 24 hours or so. Sugars 190s at home. He was seen yesterday in urgent care and referred to the ER, but says he wasn't able to make it at that time so he comes today instead. On initial workup, the patient has CT evidence of Fourniers gangrene.  He will be brought to the operating room emergently and admitted after to ICU for further management.      Overview/Hospital Course:  Mr. Wagner presented with scrotal pain and swelling.  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  Empiric initiated with cefepime, clindamycin, metronidazole, and vancomycin.  Urology consulted and he underwent emergent excision and debridement of necrotic tissue on 11/15. Repeat washout performed 11/17. Improved and stepped down from ICU 11/18.      Interval History: No acute event, reports pain with dressing changes that are now controlled, overall feeling better; no significant change.    Review of Systems   Constitutional:  Negative for chills and fever.   Respiratory:  Negative for cough and shortness of breath.    Cardiovascular:  Negative for chest pain and palpitations.   Genitourinary:  Positive for scrotal swelling.   Objective:     Vital Signs (Most Recent):  Temp: 98.5 °F (36.9 °C) (11/22/22  1149)  Pulse: 96 (11/22/22 1149)  Resp: 16 (11/22/22 1431)  BP: 135/76 (11/22/22 1149)  SpO2: 98 % (11/22/22 1149)   Vital Signs (24h Range):  Temp:  [97.4 °F (36.3 °C)-98.5 °F (36.9 °C)] 98.5 °F (36.9 °C)  Pulse:  [] 96  Resp:  [16-20] 16  SpO2:  [94 %-98 %] 98 %  BP: (133-143)/(74-82) 135/76     Weight: 114.1 kg (251 lb 8.7 oz)  Body mass index is 35.08 kg/m².    Intake/Output Summary (Last 24 hours) at 11/22/2022 1553  Last data filed at 11/22/2022 1200  Gross per 24 hour   Intake 240 ml   Output 550 ml   Net -310 ml        Physical Exam  Constitutional:       General: He is sleeping.      Appearance: He is well-developed. He is obese.   HENT:      Head: Normocephalic and atraumatic.      Nose: Nose normal.   Eyes:      Extraocular Movements: Extraocular movements intact.      Conjunctiva/sclera: Conjunctivae normal.   Cardiovascular:      Rate and Rhythm: Normal rate and regular rhythm.      Pulses:           Radial pulses are 2+ on the right side and 2+ on the left side.      Heart sounds: Normal heart sounds.   Pulmonary:      Effort: Pulmonary effort is normal.      Breath sounds: Normal breath sounds.   Abdominal:      General: Bowel sounds are normal. There is no distension.      Palpations: Abdomen is soft.      Tenderness: There is no abdominal tenderness. There is no guarding or rebound.   Musculoskeletal:         General: Normal range of motion.      Cervical back: Normal range of motion and neck supple.   Skin:     General: Skin is warm and dry.      Comments: Packing noted to left scrotum   Neurological:      Mental Status: He is oriented to person, place, and time.      Motor: Motor function is intact.   Psychiatric:         Mood and Affect: Mood normal.         Behavior: Behavior normal.         Thought Content: Thought content normal.       Significant Labs: All pertinent labs within the past 24 hours have been reviewed.    Significant Imaging: I have reviewed all pertinent imaging  results/findings within the past 24 hours.      Assessment/Plan:      * Sepsis  Mary's Gangrene of Scrotum  Leukocytosis  Lactic acidosis  Elevated troponin  - Patient criteria for sepsis with infective focus of Mary gangrene of the scrotum  - Lactic acid level 4.4, procalcitonin >48, WBC 24, and elevated troponin levels that trended down  - Urology consulted and patient underwent excision and debridement of necrotic scrotal tissue on 11/15; repeat debridement 11/17.  - Continue cefepime 1g IV q.12, clindamycin 900 mg IV Q 8, metronidazole 500 mg IV Q 8 and vancomycin dosed by pharmacy  - Elevated troponins likely secondary to sepsis and setting of DONNA given no reported chest pain and no acute EKG changes  - Blood cultures no growth to date and wound cultures with Anaerococcus species  - Wound care ordered and Urology following  - Clinically improving with resolution of lactic acidosis and definitive debridement  - Appreciate Urology assistance.  - Adjust pain medication regimen with addition of medication for breakthrough pain  - ID consult appreciated, and clindamycin and vancomycin stopped on 11/21  - Await sensitivities    DONNA (acute kidney injury)  - Creatinine 2.5, baseline- 1.3; likely due to hypotension/sepsis  - Improved with IV fluids and treatment of sepsis  - Creatinine down to normal levels  - Continue monitor with repeat labs and follow I/O's    Chronic diastolic heart failure  CAD  - Last echo with normal EF and grade 1 diastolic dysfunction on 10/28/2022  - No overt signs of volume overload and no reported chest pain  - Elevated troponin due to sepsis with associated hypotension along with DONNA and CHF which were trended down and no ectopy noted on telemetry, and no acute EKG changes  - Continue digoxin 125 mcg p.o. daily, ASA 81 mg p.o. daily and ezetimibe 10 mg p.o. 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  - Glucoses levels reviewed, are at targets  - Increase detemir to 24U subQ daily, and moderate-dose sliding scale insulin aspart 1-10U subQ TIDWM PRN.  - Will adjust insulin regimen as needed to achieve glucose between 140 - 180    Essential hypertension  - Hypotensive was on admit that responded to treatment with IV fluids with no need for pressor support  - Blood pressure predominantly in normal range; monitor.  - Continue to hold antihypertensives for now.      VTE Risk Mitigation (From admission, onward)         Ordered     IP VTE HIGH RISK PATIENT  Once         11/15/22 1948     Reason for No Pharmacological VTE Prophylaxis  Once        Question:  Reasons:  Answer:  Risk of Bleeding    11/15/22 1948     Place sequential compression device  Until discontinued         11/15/22 1936                    Darby Obrien MD  Department of Hospital Medicine   [facility] ([place]  "

## 2022-11-22 NOTE — ASSESSMENT & PLAN NOTE
66 yo male who presented with Mary's. S/p excision and debridement of scrotum 11/15/22 and 11/17/22.    - Admitted to hospital medicine, appreciate assistance  - Second debridement 11/17 in OR. Medial aspect w/ enlarging eschar.  - Will discuss with staff about further operative plans. Keep NPO for now.  - Ricketts removed 11/21. Patient voiding.  - Diet, insulin and pain control per primary    - Continue antibiotics per infectious disease recs. On cefepime/flagyl.  - Anaerobic culture with Anaerococcus murdochii and Prevotella bucchalis.  - Dressing changes BID. Wound care following.  - Rest of care per primary

## 2022-11-22 NOTE — ASSESSMENT & PLAN NOTE
Mary's Gangrene of Scrotum  Leukocytosis  Lactic acidosis  Elevated troponin  - Patient criteria for sepsis with infective focus of Mary gangrene of the scrotum  - Lactic acid level 4.4, procalcitonin >48, WBC 24, and elevated troponin levels that trended down  - Urology consulted and patient underwent excision and debridement of necrotic scrotal tissue on 11/15; repeat debridement 11/17.  - Continue cefepime 1g IV q.12, clindamycin 900 mg IV Q 8, metronidazole 500 mg IV Q 8 and vancomycin dosed by pharmacy  - Elevated troponins likely secondary to sepsis and setting of DONNA given no reported chest pain and no acute EKG changes  - Blood cultures no growth to date and wound cultures with Anaerococcus species  - Wound care ordered and Urology following  - Clinically improving with resolution of lactic acidosis and definitive debridement  - Appreciate Urology assistance.  - Adjust pain medication regimen with addition of medication for breakthrough pain  - ID consult appreciated, and clindamycin and vancomycin stopped on 11/21  - Await sensitivities

## 2022-11-22 NOTE — PLAN OF CARE
POC reviewed. No significant events this shift. Pt stable on RA. Complaints of pain treated with PRN pain medication per MAR. Pt voids and shifts in bed with assist. Bed low and locked, side rails up x3, call light within reach.    Problem: Adult Inpatient Plan of Care  Goal: Plan of Care Review  Outcome: Ongoing, Progressing  Goal: Patient-Specific Goal (Individualized)  Outcome: Ongoing, Progressing  Goal: Absence of Hospital-Acquired Illness or Injury  Outcome: Ongoing, Progressing  Goal: Optimal Comfort and Wellbeing  Outcome: Ongoing, Progressing  Goal: Readiness for Transition of Care  Outcome: Ongoing, Progressing     Problem: Impaired Wound Healing  Goal: Optimal Wound Healing  Outcome: Ongoing, Progressing     Problem: Adjustment to Illness (Sepsis/Septic Shock)  Goal: Optimal Coping  Outcome: Ongoing, Progressing     Problem: Bleeding (Sepsis/Septic Shock)  Goal: Absence of Bleeding  Outcome: Ongoing, Progressing     Problem: Glycemic Control Impaired (Sepsis/Septic Shock)  Goal: Blood Glucose Level Within Desired Range  Outcome: Ongoing, Progressing     Problem: Infection Progression (Sepsis/Septic Shock)  Goal: Absence of Infection Signs and Symptoms  Outcome: Ongoing, Progressing     Problem: Nutrition Impaired (Sepsis/Septic Shock)  Goal: Optimal Nutrition Intake  Outcome: Ongoing, Progressing     Problem: Fluid and Electrolyte Imbalance (Acute Kidney Injury/Impairment)  Goal: Fluid and Electrolyte Balance  Outcome: Ongoing, Progressing     Problem: Oral Intake Inadequate (Acute Kidney Injury/Impairment)  Goal: Optimal Nutrition Intake  Outcome: Ongoing, Progressing     Problem: Renal Function Impairment (Acute Kidney Injury/Impairment)  Goal: Effective Renal Function  Outcome: Ongoing, Progressing     Problem: Diabetes Comorbidity  Goal: Blood Glucose Level Within Targeted Range  Outcome: Ongoing, Progressing     Problem: Infection  Goal: Absence of Infection Signs and Symptoms  Outcome: Ongoing,  Progressing     Problem: Skin Injury Risk Increased  Goal: Skin Health and Integrity  Outcome: Ongoing, Progressing     Problem: Fall Injury Risk  Goal: Absence of Fall and Fall-Related Injury  Outcome: Ongoing, Progressing

## 2022-11-22 NOTE — SUBJECTIVE & OBJECTIVE
Interval History: No acute events overnight. Patient comfortable and denies pain. Ricketts removed yesterday and patient voiding. Afebrile, vital signs stable.    Objective:     Temp:  [97.5 °F (36.4 °C)-98.4 °F (36.9 °C)] 98 °F (36.7 °C)  Pulse:  [0-103] 103  Resp:  [16-20] 20  SpO2:  [94 %-97 %] 94 %  BP: (133-143)/(74-82) 139/82     Body mass index is 35.08 kg/m².           Drains       Drain  Duration                  Urethral Catheter 11/15/22 2145 Non-latex 16 Fr. 5 days                    Physical Exam  Vitals and nursing note reviewed.   Constitutional:       General: He is not in acute distress.     Appearance: Normal appearance.   HENT:      Head: Atraumatic.      Nose: Nose normal.   Eyes:      Extraocular Movements: Extraocular movements intact.      Pupils: Pupils are equal, round, and reactive to light.   Cardiovascular:      Rate and Rhythm: Normal rate.   Pulmonary:      Effort: Pulmonary effort is normal.   Abdominal:      General: Abdomen is flat. There is no distension.      Tenderness: There is no abdominal tenderness. There is no right CVA tenderness or left CVA tenderness.   Genitourinary:     Comments: Open wound with kerlex and ABD pads. Dressing clean and intact.  Skin edges stable; enlarging eschar medial aspect of wound    Musculoskeletal:         General: Normal range of motion.      Cervical back: Normal range of motion.   Neurological:      General: No focal deficit present.      Mental Status: He is alert and oriented to person, place, and time.   Psychiatric:         Mood and Affect: Mood normal.         Behavior: Behavior normal.       Significant Labs:    BMP:  Recent Labs   Lab 11/17/22  0357 11/18/22  0351 11/20/22  1238    139 140   K 4.5 4.3 4.4    105 104   CO2 26 28 28   BUN 31* 19 7*   CREATININE 0.9 0.8 0.7   CALCIUM 7.7* 7.6* 8.1*         CBC:   Recent Labs   Lab 11/17/22  0357 11/18/22  0351 11/20/22  1238   WBC 17.20* 7.90 9.71   HGB 13.8* 14.1 14.3   HCT 42.0  43.5 44.3    170 196         Urine Studies: No results for input(s): COLORU, APPEARANCEUA, PHUR, SPECGRAV, PROTEINUA, GLUCUA, KETONESU, BILIRUBINUA, OCCULTUA, NITRITE, UROBILINOGEN, LEUKOCYTESUR, RBCUA, WBCUA, BACTERIA, SQUAMEPITHEL, HYALINECASTS in the last 168 hours.    Invalid input(s): WRIGHTSUR    Significant Imaging:  All pertinent imaging results/findings from the past 24 hours have been reviewed.

## 2022-11-22 NOTE — SUBJECTIVE & OBJECTIVE
Interval History: No acute event, reports pain with dressing changes that are now controlled, overall feeling better.    Review of Systems   Constitutional:  Negative for chills and fever.   Respiratory:  Negative for cough and shortness of breath.    Cardiovascular:  Negative for chest pain and palpitations.   Genitourinary:  Positive for scrotal swelling.   Objective:     Vital Signs (Most Recent):  Temp: 98.4 °F (36.9 °C) (11/21/22 2003)  Pulse: 95 (11/21/22 2003)  Resp: 20 (11/21/22 2117)  BP: (!) 143/81 (11/21/22 2003)  SpO2: 97 % (11/21/22 2003)   Vital Signs (24h Range):  Temp:  [97.2 °F (36.2 °C)-98.4 °F (36.9 °C)] 98.4 °F (36.9 °C)  Pulse:  [0-156] 95  Resp:  [16-20] 20  SpO2:  [94 %-97 %] 97 %  BP: (133-174)/(79-91) 143/81     Weight: 114.1 kg (251 lb 8.7 oz)  Body mass index is 35.08 kg/m².    Intake/Output Summary (Last 24 hours) at 11/21/2022 2226  Last data filed at 11/21/2022 2007  Gross per 24 hour   Intake 287.74 ml   Output 2400 ml   Net -2112.26 ml        Physical Exam  Constitutional:       General: He is sleeping.      Appearance: He is well-developed. He is obese.   HENT:      Head: Normocephalic and atraumatic.      Nose: Nose normal.   Eyes:      Extraocular Movements: Extraocular movements intact.      Conjunctiva/sclera: Conjunctivae normal.   Cardiovascular:      Rate and Rhythm: Normal rate and regular rhythm.      Pulses:           Radial pulses are 2+ on the right side and 2+ on the left side.      Heart sounds: Normal heart sounds.   Pulmonary:      Effort: Pulmonary effort is normal.      Breath sounds: Normal breath sounds.   Abdominal:      General: Bowel sounds are normal. There is no distension.      Palpations: Abdomen is soft.      Tenderness: There is no abdominal tenderness. There is no guarding or rebound.   Musculoskeletal:         General: Normal range of motion.      Cervical back: Normal range of motion and neck supple.   Skin:     General: Skin is warm and dry.       Comments: Packing noted to left scrotum   Neurological:      Mental Status: He is oriented to person, place, and time.      Motor: Motor function is intact.   Psychiatric:         Mood and Affect: Mood normal.         Behavior: Behavior normal.         Thought Content: Thought content normal.       Significant Labs: All pertinent labs within the past 24 hours have been reviewed.    Significant Imaging: I have reviewed all pertinent imaging results/findings within the past 24 hours.

## 2022-11-22 NOTE — ASSESSMENT & PLAN NOTE
- HgbA1c of 7.6  - Continue cardiac/diabetic diet.  - Glucoses levels reviewed, are at targets  - Increase detemir to 24U subQ daily, and moderate-dose sliding scale insulin aspart 1-10U subQ TIDWM PRN.  - Will adjust insulin regimen as needed to achieve glucose between 140 - 180

## 2022-11-22 NOTE — PROGRESS NOTES
IP Liaison - Initial Visit Note    Patient: Nithin Wagner  MRN:  [id number]  Date of Service:  11/22/2022  Completed by:  AMY Malik    Reason for Visit   Patient presents with    IP Liaison Initial Visit       RSW met with patient at bedside in order to complete SDOH questionnaire and liaison assessment.  Pt has identified barriers to care of transportation.  RSW has provided patient with [facility] resources to address identified barriers.     The following were addressed during this visit:  - Review SDOH Questions   - Complete patient assessment   - Discuss and review program options to quit smoking   - Review and discuss options to address the patient's transportation needs   - Review and discuss options for social groups or events   - Review and discuss options to become more physically active   - Review and discuss options to provide financial support   - Review and discuss options for reducing daily stress   - Refer patient to Complex Case Management (OPCM)   - Complete initial visit with patient        Patient Summary     IP Liaison Patient Assessment    General  Level of Caregiver support: Member independent and does not need caregiver assistance  Have you had to make a decision between paying for any of the following in the last 2 months?: None  Transportation means: Family, Friend  Employment status: Part time  Current symptoms: Sleep disturbances  Assessments  Was the PHQ Depression Screening completed this visit?: Yes  Was the JEET-7 Screening completed this visit?: No       AMY Malik

## 2022-11-22 NOTE — ASSESSMENT & PLAN NOTE
Mary's Gangrene of Scrotum  Leukocytosis  Lactic acidosis  Elevated troponin  - Patient criteria for sepsis with infective focus of Mary gangrene of the scrotum  - Lactic acid level 4.4, procalcitonin >48, WBC 24, and elevated troponin levels that trended down  - Urology consulted and patient underwent excision and debridement of necrotic scrotal tissue on 11/15; repeat debridement 11/17.  - Continue cefepime 1g IV q.12, clindamycin 900 mg IV Q 8, metronidazole 500 mg IV Q 8 and vancomycin dosed by pharmacy  - Elevated troponins likely secondary to sepsis and setting of DONNA given no reported chest pain and no acute EKG changes  - Blood cultures no growth to date and wound cultures with Anaerococcus species  - Wound care ordered and Urology following  - Clinically improving with resolution of lactic acidosis and definitive debridement  - Appreciate Urology assistance.  - Adjust pain medication regimen with addition of medication for breakthrough pain  - ID consult appreciated, and clindamycin and vancomycin stopped  - Await sensitivities

## 2022-11-23 LAB
BILIRUB UR QL STRIP: NEGATIVE
CLARITY UR: CLEAR
COLOR UR: YELLOW
GLUCOSE UR QL STRIP: ABNORMAL
HGB UR QL STRIP: NEGATIVE
KETONES UR QL STRIP: ABNORMAL
LEUKOCYTE ESTERASE UR QL STRIP: NEGATIVE
NITRITE UR QL STRIP: NEGATIVE
PH UR STRIP: 7 [PH] (ref 5–8)
POCT GLUCOSE: 164 MG/DL (ref 70–110)
PROT UR QL STRIP: NEGATIVE
SP GR UR STRIP: 1.01 (ref 1–1.03)
URN SPEC COLLECT METH UR: ABNORMAL
UROBILINOGEN UR STRIP-ACNC: NEGATIVE EU/DL

## 2022-11-23 PROCEDURE — 99233 PR SUBSEQUENT HOSPITAL CARE,LEVL III: ICD-10-PCS | Mod: ,,, | Performed by: HOSPITALIST

## 2022-11-23 PROCEDURE — 63600175 PHARM REV CODE 636 W HCPCS: Performed by: HOSPITALIST

## 2022-11-23 PROCEDURE — 63600175 PHARM REV CODE 636 W HCPCS: Performed by: NURSE PRACTITIONER

## 2022-11-23 PROCEDURE — 99233 SBSQ HOSP IP/OBS HIGH 50: CPT | Mod: ,,, | Performed by: HOSPITALIST

## 2022-11-23 PROCEDURE — 87040 BLOOD CULTURE FOR BACTERIA: CPT | Performed by: INTERNAL MEDICINE

## 2022-11-23 PROCEDURE — 99233 PR SUBSEQUENT HOSPITAL CARE,LEVL III: ICD-10-PCS | Mod: ,,, | Performed by: INTERNAL MEDICINE

## 2022-11-23 PROCEDURE — 11000001 HC ACUTE MED/SURG PRIVATE ROOM

## 2022-11-23 PROCEDURE — 63600175 PHARM REV CODE 636 W HCPCS: Performed by: INTERNAL MEDICINE

## 2022-11-23 PROCEDURE — 81003 URINALYSIS AUTO W/O SCOPE: CPT | Performed by: INTERNAL MEDICINE

## 2022-11-23 PROCEDURE — 97530 THERAPEUTIC ACTIVITIES: CPT

## 2022-11-23 PROCEDURE — 25000003 PHARM REV CODE 250: Performed by: INTERNAL MEDICINE

## 2022-11-23 PROCEDURE — 36415 COLL VENOUS BLD VENIPUNCTURE: CPT | Performed by: INTERNAL MEDICINE

## 2022-11-23 PROCEDURE — 25000003 PHARM REV CODE 250: Performed by: UROLOGY

## 2022-11-23 PROCEDURE — 25000003 PHARM REV CODE 250: Performed by: HOSPITALIST

## 2022-11-23 PROCEDURE — 87086 URINE CULTURE/COLONY COUNT: CPT | Performed by: INTERNAL MEDICINE

## 2022-11-23 PROCEDURE — 99233 SBSQ HOSP IP/OBS HIGH 50: CPT | Mod: ,,, | Performed by: INTERNAL MEDICINE

## 2022-11-23 RX ADMIN — FOLIC ACID 1 MG: 1 TABLET ORAL at 09:11

## 2022-11-23 RX ADMIN — NYSTATIN 500000 UNITS: 500000 SUSPENSION ORAL at 05:11

## 2022-11-23 RX ADMIN — CEFEPIME HYDROCHLORIDE 1 G: 1 INJECTION, SOLUTION INTRAVENOUS at 09:11

## 2022-11-23 RX ADMIN — INSULIN DETEMIR 18 UNITS: 100 INJECTION, SOLUTION SUBCUTANEOUS at 09:11

## 2022-11-23 RX ADMIN — METRONIDAZOLE 500 MG: 500 TABLET ORAL at 05:11

## 2022-11-23 RX ADMIN — METRONIDAZOLE 500 MG: 500 TABLET ORAL at 01:11

## 2022-11-23 RX ADMIN — INSULIN ASPART 4 UNITS: 100 INJECTION, SOLUTION INTRAVENOUS; SUBCUTANEOUS at 09:11

## 2022-11-23 RX ADMIN — MORPHINE SULFATE 6 MG: 2 INJECTION, SOLUTION INTRAMUSCULAR; INTRAVENOUS at 08:11

## 2022-11-23 RX ADMIN — DIGOXIN 125 MCG: 125 TABLET ORAL at 09:11

## 2022-11-23 RX ADMIN — MORPHINE SULFATE 6 MG: 2 INJECTION, SOLUTION INTRAMUSCULAR; INTRAVENOUS at 04:11

## 2022-11-23 RX ADMIN — CEFEPIME HYDROCHLORIDE 1 G: 1 INJECTION, SOLUTION INTRAVENOUS at 01:11

## 2022-11-23 RX ADMIN — OXYCODONE HYDROCHLORIDE AND ACETAMINOPHEN 1 TABLET: 5; 325 TABLET ORAL at 03:11

## 2022-11-23 RX ADMIN — CEFEPIME HYDROCHLORIDE 1 G: 1 INJECTION, SOLUTION INTRAVENOUS at 05:11

## 2022-11-23 RX ADMIN — EZETIMIBE 10 MG: 10 TABLET ORAL at 09:11

## 2022-11-23 RX ADMIN — ONDANSETRON 4 MG: 2 INJECTION INTRAMUSCULAR; INTRAVENOUS at 04:11

## 2022-11-23 RX ADMIN — NYSTATIN 500000 UNITS: 500000 SUSPENSION ORAL at 09:11

## 2022-11-23 RX ADMIN — METRONIDAZOLE 500 MG: 500 TABLET ORAL at 09:11

## 2022-11-23 RX ADMIN — ASPIRIN 81 MG CHEWABLE TABLET 81 MG: 81 TABLET CHEWABLE at 09:11

## 2022-11-23 RX ADMIN — THERA TABS 1 TABLET: TAB at 09:11

## 2022-11-23 RX ADMIN — MORPHINE SULFATE 6 MG: 2 INJECTION, SOLUTION INTRAMUSCULAR; INTRAVENOUS at 12:11

## 2022-11-23 RX ADMIN — NYSTATIN 500000 UNITS: 500000 SUSPENSION ORAL at 01:11

## 2022-11-23 RX ADMIN — OXYCODONE 15 MG: 5 TABLET ORAL at 11:11

## 2022-11-23 NOTE — PLAN OF CARE
POC reviewed with patient. AAOx4. VS stable on room air. Complaints of scrotal pain verbalized during shift, morphine 6 mg iv, full relief. Dsg to wound at scrotum with light saturation. Received IV antibiotics according to MAR. Urinal at bedside. No injuries, falls, or trauma occurred during shift. Purposeful rounding completed. Bed low and locked with side rails up x 3 and call light within reach. Will continue to monitor.

## 2022-11-23 NOTE — PROGRESS NOTES
[facility] ([place])  Urology  Progress Note    Patient Name: Nithin Wagner  MRN: [id number]  Admission Date: 11/15/2022  Hospital Length of Stay: 8 days  Code Status: Full Code   Attending Provider: Darby Obrien MD   Primary Care Physician: Tushar Drew MD    Subjective:     HPI:  67M h/o DM2, CAD, HTN, HLD presents with scrotal pain and swelling. Started about 4 weeks ago and has been progressively worsening despite a couple courses of antibiotics (unsure which ones). He reports fevers and chills at home. He also reports nausea, decreased PO intake, multiple syncopal episodes, and stool incontinence. He is on continuous blood glucose monitoring and states blood sugars have been in 190s today. He was seen yesterday in urgent care and diagnosed with scrotal abscess with possible kami's; he referred to the ER but waited until today to come in.    He was hypotensive and tachycardic on arrival in the ED. Sepsis protocol was started. Labs and imaging are currently pending.         Interval History: Medial aspect of wound debrided at bedside yesterday. Patient feels well, denies pain. Voiding without issues, having BM's. Afebrile, vital signs stable.    Objective:     Temp:  [97.4 °F (36.3 °C)-99.2 °F (37.3 °C)] 99.2 °F (37.3 °C)  Pulse:  [93-99] 95  Resp:  [15-19] 15  SpO2:  [91 %-98 %] 93 %  BP: (126-143)/(68-76) 140/75     Body mass index is 35.08 kg/m².           Drains       Drain  Duration                  Urethral Catheter 11/15/22 2145 Non-latex 16 Fr. 5 days                    Physical Exam  Vitals and nursing note reviewed.   Constitutional:       General: He is not in acute distress.     Appearance: Normal appearance.   HENT:      Head: Atraumatic.      Nose: Nose normal.   Eyes:      Extraocular Movements: Extraocular movements intact.      Pupils: Pupils are equal, round, and reactive to light.   Cardiovascular:      Rate and Rhythm: Normal rate.   Pulmonary:      Effort: Pulmonary effort is  normal.   Abdominal:      General: Abdomen is flat. There is no distension.      Tenderness: There is no abdominal tenderness. There is no right CVA tenderness or left CVA tenderness.   Genitourinary:     Comments: Open wound with kerlex and ABD pads. Dressing clean and intact.  Wound edges appear healthy and viable. Small eschar right hemiscrotum away from wound.    Musculoskeletal:         General: Normal range of motion.      Cervical back: Normal range of motion.   Neurological:      General: No focal deficit present.      Mental Status: He is alert and oriented to person, place, and time.   Psychiatric:         Mood and Affect: Mood normal.         Behavior: Behavior normal.       Significant Labs:    BMP:  Recent Labs   Lab 11/17/22  0357 11/18/22  0351 11/20/22  1238    139 140   K 4.5 4.3 4.4    105 104   CO2 26 28 28   BUN 31* 19 7*   CREATININE 0.9 0.8 0.7   CALCIUM 7.7* 7.6* 8.1*         CBC:   Recent Labs   Lab 11/17/22 0357 11/18/22  0351 11/20/22  1238   WBC 17.20* 7.90 9.71   HGB 13.8* 14.1 14.3   HCT 42.0 43.5 44.3    170 196         Urine Studies: No results for input(s): COLORU, APPEARANCEUA, PHUR, SPECGRAV, PROTEINUA, GLUCUA, KETONESU, BILIRUBINUA, OCCULTUA, NITRITE, UROBILINOGEN, LEUKOCYTESUR, RBCUA, WBCUA, BACTERIA, SQUAMEPITHEL, HYALINECASTS in the last 168 hours.    Invalid input(s): WRIGHTSUR    Significant Imaging:  All pertinent imaging results/findings from the past 24 hours have been reviewed.      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    - Admitted to hospital medicine, appreciate assistance  - Debrided 11/15 and 11/17 in OR. Medial aspect of wound debrided at bedside 11/22.  - Ricketts removed 11/21. Patient voiding.  - Diet, insulin and pain control per primary    - Continue antibiotics per infectious disease recs. On cefepime/flagyl.  - Anaerobic culture with Anaerococcus murdochii and  Prevotella mary.  - Dressing changes BID. Wound care following.  - Rest of care per primary        VTE Risk Mitigation (From admission, onward)         Ordered     IP VTE HIGH RISK PATIENT  Once         11/15/22 1948     Reason for No Pharmacological VTE Prophylaxis  Once        Question:  Reasons:  Answer:  Risk of Bleeding    11/15/22 1948     Place sequential compression device  Until discontinued         11/15/22 1936                Jarred Zamora MD  Urology  [facility] ([place])

## 2022-11-23 NOTE — ASSESSMENT & PLAN NOTE
68 yo man with RA admitted with scrotal abscess and Mary's after suffering a leg infection (left thigh) and failing outpatient abx  - s/p debridement x 3, lastly yesterday at bedside  - culture only growing Anaerococcus and Prevotella (though patient was on abx prior to admission)  - feels worse today for unclear reasons with slight left shift on WBC differential  - check PCT and CXR  - repeat blood and urine studies  - continue cefepime and Flagyl for now  - US right leg?  - will follow with you

## 2022-11-23 NOTE — PROGRESS NOTES
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  - will follow with you      Raj Christianson MD  Infectious Disease  [facility] ([place])    Subjective:     Principal Problem:Sepsis    HPI: 67M h/o DM2, CAD, HTN, HLD presents with scrotal pain and swelling. Started about 4 weeks ago and has been progressively worsening despite a couple courses of antibiotics (most recently Bactrim). He reports fevers and chills at home. He also reports nausea, decreased PO intake, multiple syncopal episodes, and stool incontinence. He is on continuous blood glucose monitoring and states blood sugars have been in 190s today. He was seen PTA in urgent care and diagnosed with scrotal abscess with possible Mary's; he referred to the ER but waited to present. After taking a nap when getting his toothbrush, he woke up confused and in feces. He was hypotensive and tachycardic, on arrival in the ED. Sepsis protocol was started. Labs and imaging are noted c/w scrotal abscess and Mary's. Patient went to OR twice for debridement. Appropriate cultures were obtained and grew Anaerococcus. The patient is currently on cefepime, vancomycin, and Flagyl. ID is consulted for  abx recommendations for Mary's.        Interval History: Events noted. Patient reports feeling worse today - muscle aches, subjective fever. No dysuria or alicia diarrhea.     Review of Systems   Constitutional:  Positive for fatigue.   Musculoskeletal:  Positive for myalgias.   Skin:  Positive for wound.   Objective:     Vital Signs (Most Recent):  Temp: 98.1 °F (36.7 °C) (11/23/22 0800)  Pulse: 95 (11/23/22 0800)  Resp: 16 (11/23/22 0800)  BP: 139/73 (11/23/22 0800)  SpO2: 95 % (11/23/22 0800) Vital Signs (24h Range):  Temp:  [98 °F (36.7 °C)-99.2 °F (37.3 °C)] 98.1 °F (36.7 °C)  Pulse:  [93-99] 95  Resp:  [15-19] 16  SpO2:  [91 %-98 %] 95 %  BP: (126-143)/(68-76) 139/73     Weight: 114.1 kg (251 lb 8.7 oz)  Body mass index is 35.08 kg/m².    Estimated Creatinine Clearance: 127.9 mL/min (based on SCr of 0.7 mg/dL).    Physical Exam  Vitals and nursing note reviewed.   Constitutional:       General: He is not in acute distress.     Appearance: Normal appearance. He is not ill-appearing, toxic-appearing or diaphoretic.   HENT:      Head: Normocephalic and atraumatic.      Right Ear: External ear normal.      Left Ear: External ear normal.      Mouth/Throat:      Mouth: Mucous membranes are moist.   Eyes:      Extraocular Movements: Extraocular movements intact.      Conjunctiva/sclera: Conjunctivae normal.      Pupils: Pupils are equal, round, and reactive to light.   Cardiovascular:      Rate and Rhythm: Normal rate and regular rhythm.   Pulmonary:      Effort: Pulmonary effort is normal.      Breath sounds: Rhonchi and rales present.   Abdominal:      General: There is distension.      Tenderness: There is no guarding or rebound.   Musculoskeletal:         General: Swelling present. No tenderness or signs of injury.      Right lower leg: Edema present.   Skin:     Findings: No erythema.   Neurological:      General: No focal deficit present.      Mental Status: He is alert and oriented to person, place, and  time. Mental status is at baseline.   Psychiatric:         Mood and Affect: Mood normal.         Behavior: Behavior normal.         Thought Content: Thought content normal.         Judgment: Judgment normal.       Significant Labs: CBC: No results for input(s): WBC, HGB, HCT, PLT in the last 48 hours.  CMP: No results for input(s): NA, K, CL, CO2, GLU, BUN, CREATININE, CALCIUM, PROT, ALBUMIN, BILITOT, ALKPHOS, AST, ALT, ANIONGAP, EGFRNONAA in the last 48 hours.    Invalid input(s): ESTGFAFRICA  Procalcitonin: No results for input(s): PROCAL in the last 48 hours.  Urine Culture: No results for input(s): LABURIN in the last 4320 hours.  Wound Culture:   Recent Labs   Lab 11/15/22  2139   LABAERO No growth       Significant Imaging: I have reviewed all pertinent imaging results/findings within the past 24 hours.

## 2022-11-23 NOTE — PLAN OF CARE
Problem: Physical Therapy  Goal: Physical Therapy Goal  Description: Goals to be met by: 22    Patient will increase functional independence with mobility by performin. Supine<>sit with SPV and 1 UE if needed.   2. Sit<>stand with LRAD with SPV.  3. Gait x 100 feet with LRAD with SBA.  4. Ascend/descend 10 step(s) with least restrictive assistive device and SBA.   Outcome: Ongoing, Progressing     Pt tolerated session well today. Despite reporting general fatigue/weakness today, pt able to maintain standing balance with SBA and demonstrate independence with self hygiene. Completes supine<>sit using log roll technique with bed rail for UE assist as needed. Pt requested to ambulate during following session due to his lack of strength today. Pt continues to display safety and independence with bed mobility and transfers/balance. PT to follow up and progress as tolerated.

## 2022-11-23 NOTE — PT/OT/SLP PROGRESS
"Physical Therapy Treatment    Patient Name:  Nithin Wagner   MRN:  [id number]    Recommendations:     Discharge Recommendations:  home   Discharge Equipment Recommendations: none   Barriers to discharge: None    Assessment:     Nithin Wagner is a 67 y.o. male admitted with a medical diagnosis of Sepsis.  He presents with the following impairments/functional limitations:  weakness, impaired endurance, gait instability, impaired functional mobility, decreased lower extremity function, impaired balance, edema, impaired cardiopulmonary response to activity.    Pt tolerated session well today. Despite reporting general fatigue/weakness today, pt able to maintain standing balance with SBA and demonstrate independence with self hygiene. Completes supine<>sit using log roll technique with bed rail for UE assist as needed. Pt requested to ambulate during following session due to his lack of strength today. PT to follow up and progress as tolerated.     Rehab Prognosis: Good; patient would benefit from acute skilled PT services to address these deficits and reach maximum level of function.    Recent Surgery: Procedure(s) (LRB):  INCISION AND DRAINAGE, SCROTUM (N/A) 6 Days Post-Op    Plan:     During this hospitalization, patient to be seen 3 x/week ((ambulate QID)) to address the identified rehab impairments via gait training, therapeutic exercises, neuromuscular re-education, therapeutic activities and progress toward the following goals:    Plan of Care Expires:  12/21/22    Subjective     Chief Complaint: weakness/fatigue   Patient/Family Comments/goals: "feeling a little more tired/weak today"; pt requested to ambulate at next session once he is feeling more up to it     Pain/Comfort:  Pain Rating 1: other (see comments) (score not verbalized)  Location 1: scrotum  Pain Addressed 1: Cessation of Activity, Distraction, Reposition  Pain Rating Post-Intervention 1: other (see comments) (did not progress during " session)      Objective:     Communicated with nurse Zamora prior to session.  Patient found supine with peripheral IV, oxygen upon PT entry to room.     General Precautions: Standard, fall   Orthopedic Precautions:N/A   Braces: N/A  Respiratory Status: Nasal cannula, flow 2 L/min     Functional Mobility:  Bed Mobility:     Supine to Sit: mod independence with bed rail and supervision using log roll technique   Sit to Supine: independence and supervision  Transfers:     Sit to Stand:  supervision-SBA with no AD  Balance: static standing good with wide SHAHID noted during self hygiene       AM-PAC 6 CLICK MOBILITY  Turning over in bed (including adjusting bedclothes, sheets and blankets)?: 4  Sitting down on and standing up from a chair with arms (e.g., wheelchair, bedside commode, etc.): 3  Moving from lying on back to sitting on the side of the bed?: 4  Moving to and from a bed to a chair (including a wheelchair)?: 3  Need to walk in hospital room?: 3  Climbing 3-5 steps with a railing?: 3  Basic Mobility Total Score: 20       Treatment & Education:    PT educated patient re:   PT plan of care/role of PT  Safety with OOB mobility  Use of bed rail to assist with log roll if needed  Discharge disposition    Pt verbalized understanding      Patient left supine with all lines intact, call button in reach, and nurse Zamora notified.    GOALS:   Multidisciplinary Problems       Physical Therapy Goals          Problem: Physical Therapy    Goal Priority Disciplines Outcome Goal Variances Interventions   Physical Therapy Goal     PT, PT/OT Ongoing, Progressing     Description: Goals to be met by: 22    Patient will increase functional independence with mobility by performin. Supine<>sit with SPV and 1 UE if needed.   2. Sit<>stand with LRAD with SPV.  3. Gait x 100 feet with LRAD with SBA.  4. Ascend/descend 10 step(s) with least restrictive assistive device and SBA.                        Time Tracking:      PT Received On: 11/23/22  PT Start Time: 1330     PT Stop Time: 1350  PT Total Time (min): 20 min     Billable Minutes: Therapeutic Activity 20    Treatment Type: Treatment  PT/PTA: PT     PTA Visit Number: 0     11/23/2022

## 2022-11-23 NOTE — SUBJECTIVE & OBJECTIVE
Interval History: Events noted. Patient reports feeling worse today - muscle aches, subjective fever. No dysuria or alicia diarrhea.     Review of Systems   Constitutional:  Positive for fatigue.   Musculoskeletal:  Positive for myalgias.   Skin:  Positive for wound.   Objective:     Vital Signs (Most Recent):  Temp: 98.1 °F (36.7 °C) (11/23/22 0800)  Pulse: 95 (11/23/22 0800)  Resp: 16 (11/23/22 0800)  BP: 139/73 (11/23/22 0800)  SpO2: 95 % (11/23/22 0800) Vital Signs (24h Range):  Temp:  [98 °F (36.7 °C)-99.2 °F (37.3 °C)] 98.1 °F (36.7 °C)  Pulse:  [93-99] 95  Resp:  [15-19] 16  SpO2:  [91 %-98 %] 95 %  BP: (126-143)/(68-76) 139/73     Weight: 114.1 kg (251 lb 8.7 oz)  Body mass index is 35.08 kg/m².    Estimated Creatinine Clearance: 127.9 mL/min (based on SCr of 0.7 mg/dL).    Physical Exam  Vitals and nursing note reviewed.   Constitutional:       General: He is not in acute distress.     Appearance: Normal appearance. He is not ill-appearing, toxic-appearing or diaphoretic.   HENT:      Head: Normocephalic and atraumatic.      Right Ear: External ear normal.      Left Ear: External ear normal.      Mouth/Throat:      Mouth: Mucous membranes are moist.   Eyes:      Extraocular Movements: Extraocular movements intact.      Conjunctiva/sclera: Conjunctivae normal.      Pupils: Pupils are equal, round, and reactive to light.   Cardiovascular:      Rate and Rhythm: Normal rate and regular rhythm.   Pulmonary:      Effort: Pulmonary effort is normal.      Breath sounds: Rhonchi and rales present.   Abdominal:      General: There is distension.      Tenderness: There is no guarding or rebound.   Musculoskeletal:         General: Swelling present. No tenderness or signs of injury.      Right lower leg: Edema present.   Skin:     Findings: No erythema.   Neurological:      General: No focal deficit present.      Mental Status: He is alert and oriented to person, place, and time. Mental status is at baseline.    Psychiatric:         Mood and Affect: Mood normal.         Behavior: Behavior normal.         Thought Content: Thought content normal.         Judgment: Judgment normal.       Significant Labs: CBC: No results for input(s): WBC, HGB, HCT, PLT in the last 48 hours.  CMP: No results for input(s): NA, K, CL, CO2, GLU, BUN, CREATININE, CALCIUM, PROT, ALBUMIN, BILITOT, ALKPHOS, AST, ALT, ANIONGAP, EGFRNONAA in the last 48 hours.    Invalid input(s): ESTGFAFRICA  Procalcitonin: No results for input(s): PROCAL in the last 48 hours.  Urine Culture: No results for input(s): LABURIN in the last 4320 hours.  Wound Culture:   Recent Labs   Lab 11/15/22  2139   LABAERO No growth       Significant Imaging: I have reviewed all pertinent imaging results/findings within the past 24 hours.

## 2022-11-23 NOTE — SUBJECTIVE & OBJECTIVE
Interval History: Medial aspect of wound debrided at bedside yesterday. Patient feels well, denies pain. Voiding without issues, having BM's. Afebrile, vital signs stable.    Objective:     Temp:  [97.4 °F (36.3 °C)-99.2 °F (37.3 °C)] 99.2 °F (37.3 °C)  Pulse:  [93-99] 95  Resp:  [15-19] 15  SpO2:  [91 %-98 %] 93 %  BP: (126-143)/(68-76) 140/75     Body mass index is 35.08 kg/m².           Drains       Drain  Duration                  Urethral Catheter 11/15/22 2145 Non-latex 16 Fr. 5 days                    Physical Exam  Vitals and nursing note reviewed.   Constitutional:       General: He is not in acute distress.     Appearance: Normal appearance.   HENT:      Head: Atraumatic.      Nose: Nose normal.   Eyes:      Extraocular Movements: Extraocular movements intact.      Pupils: Pupils are equal, round, and reactive to light.   Cardiovascular:      Rate and Rhythm: Normal rate.   Pulmonary:      Effort: Pulmonary effort is normal.   Abdominal:      General: Abdomen is flat. There is no distension.      Tenderness: There is no abdominal tenderness. There is no right CVA tenderness or left CVA tenderness.   Genitourinary:     Comments: Open wound with kerlex and ABD pads. Dressing clean and intact.  Wound edges appear healthy and viable. Small eschar right hemiscrotum away from wound.    Musculoskeletal:         General: Normal range of motion.      Cervical back: Normal range of motion.   Neurological:      General: No focal deficit present.      Mental Status: He is alert and oriented to person, place, and time.   Psychiatric:         Mood and Affect: Mood normal.         Behavior: Behavior normal.       Significant Labs:    BMP:  Recent Labs   Lab 11/17/22  0357 11/18/22  0351 11/20/22  1238    139 140   K 4.5 4.3 4.4    105 104   CO2 26 28 28   BUN 31* 19 7*   CREATININE 0.9 0.8 0.7   CALCIUM 7.7* 7.6* 8.1*         CBC:   Recent Labs   Lab 11/17/22  0357 11/18/22  0351 11/20/22  1238   WBC 17.20*  7.90 9.71   HGB 13.8* 14.1 14.3   HCT 42.0 43.5 44.3    170 196         Urine Studies: No results for input(s): COLORU, APPEARANCEUA, PHUR, SPECGRAV, PROTEINUA, GLUCUA, KETONESU, BILIRUBINUA, OCCULTUA, NITRITE, UROBILINOGEN, LEUKOCYTESUR, RBCUA, WBCUA, BACTERIA, SQUAMEPITHEL, HYALINECASTS in the last 168 hours.    Invalid input(s): SANDRA    Significant Imaging:  All pertinent imaging results/findings from the past 24 hours have been reviewed.

## 2022-11-23 NOTE — PLAN OF CARE
11/23/22 1258   Discharge Assessment   Assessment Type Discharge Planning Reassessment   Confirmed/corrected address, phone number and insurance Yes   Confirmed Demographics Correct on Facesheet   Source of Information patient   Communicated ALEXANDER with patient/caregiver Date not available/Unable to determine   Lives With alone   Do you expect to return to your current living situation? Yes   Do you have help at home or someone to help you manage your care at home? No   Prior to hospitilization cognitive status: Alert/Oriented   Current cognitive status: Alert/Oriented   Walking or Climbing Stairs Difficulty none   Dressing/Bathing Difficulty none   Equipment Currently Used at Home oxygen   Readmission within 30 days? No   Do you currently have service(s) that help you manage your care at home? No   Do you take prescription medications? Yes   Do you have prescription coverage? Yes   Do you have any problems affording any of your prescribed medications? No   Is the patient taking medications as prescribed? yes   How do you get to doctors appointments? car, drives self   Are you on dialysis? No   Do you take coumadin? No   Discharge Plan A Home   DME Needed Upon Discharge  none   Discharge Plan discussed with: Patient

## 2022-11-23 NOTE — ASSESSMENT & PLAN NOTE
68 yo male who presented with Mary's gangrene of scrotum.    - Admitted to hospital medicine, appreciate assistance  - Debrided 11/15 and 11/17 in OR. Medial aspect of wound debrided at bedside 11/22.  - Ricketts removed 11/21. Patient voiding.  - Diet, insulin and pain control per primary    - Continue antibiotics per infectious disease recs. On cefepime/flagyl.  - Anaerobic culture with Anaerococcus murdochii and Prevotella bucchalis.  - Dressing changes BID. Wound care following.  - Rest of care per primary

## 2022-11-24 LAB
ANION GAP SERPL CALC-SCNC: 6 MMOL/L (ref 8–16)
BASOPHILS # BLD AUTO: ABNORMAL K/UL (ref 0–0.2)
BASOPHILS NFR BLD: 0 % (ref 0–1.9)
BUN SERPL-MCNC: 4 MG/DL (ref 8–23)
CALCIUM SERPL-MCNC: 8.5 MG/DL (ref 8.7–10.5)
CHLORIDE SERPL-SCNC: 103 MMOL/L (ref 95–110)
CO2 SERPL-SCNC: 29 MMOL/L (ref 23–29)
CREAT SERPL-MCNC: 0.8 MG/DL (ref 0.5–1.4)
DIFFERENTIAL METHOD: ABNORMAL
EOSINOPHIL # BLD AUTO: ABNORMAL K/UL (ref 0–0.5)
EOSINOPHIL NFR BLD: 0 % (ref 0–8)
ERYTHROCYTE [DISTWIDTH] IN BLOOD BY AUTOMATED COUNT: 13.2 % (ref 11.5–14.5)
EST. GFR  (NO RACE VARIABLE): >60 ML/MIN/1.73 M^2
GLUCOSE SERPL-MCNC: 201 MG/DL (ref 70–110)
HCT VFR BLD AUTO: 56.7 % (ref 40–54)
HGB BLD-MCNC: 17.7 G/DL (ref 14–18)
IMM GRANULOCYTES # BLD AUTO: ABNORMAL K/UL (ref 0–0.04)
IMM GRANULOCYTES NFR BLD AUTO: ABNORMAL % (ref 0–0.5)
LYMPHOCYTES # BLD AUTO: ABNORMAL K/UL (ref 1–4.8)
LYMPHOCYTES NFR BLD: 31 % (ref 18–48)
MAGNESIUM SERPL-MCNC: 2 MG/DL (ref 1.6–2.6)
MCH RBC QN AUTO: 33.2 PG (ref 27–31)
MCHC RBC AUTO-ENTMCNC: 31.2 G/DL (ref 32–36)
MCV RBC AUTO: 106 FL (ref 82–98)
MONOCYTES # BLD AUTO: ABNORMAL K/UL (ref 0.3–1)
MONOCYTES NFR BLD: 2 % (ref 4–15)
NEUTROPHILS NFR BLD: 67 % (ref 38–73)
NRBC BLD-RTO: 0 /100 WBC
PHOSPHATE SERPL-MCNC: 2.8 MG/DL (ref 2.7–4.5)
PLATELET # BLD AUTO: 224 K/UL (ref 150–450)
PLATELET BLD QL SMEAR: ABNORMAL
PMV BLD AUTO: 10.5 FL (ref 9.2–12.9)
POCT GLUCOSE: 208 MG/DL (ref 70–110)
POCT GLUCOSE: 246 MG/DL (ref 70–110)
POTASSIUM SERPL-SCNC: 4.4 MMOL/L (ref 3.5–5.1)
PROCALCITONIN SERPL IA-MCNC: 0.19 NG/ML
RBC # BLD AUTO: 5.33 M/UL (ref 4.6–6.2)
SODIUM SERPL-SCNC: 138 MMOL/L (ref 136–145)
WBC # BLD AUTO: 5.31 K/UL (ref 3.9–12.7)

## 2022-11-24 PROCEDURE — 63600175 PHARM REV CODE 636 W HCPCS: Performed by: INTERNAL MEDICINE

## 2022-11-24 PROCEDURE — 85007 BL SMEAR W/DIFF WBC COUNT: CPT | Performed by: HOSPITALIST

## 2022-11-24 PROCEDURE — 99233 SBSQ HOSP IP/OBS HIGH 50: CPT | Mod: ,,, | Performed by: HOSPITALIST

## 2022-11-24 PROCEDURE — 36415 COLL VENOUS BLD VENIPUNCTURE: CPT | Performed by: INTERNAL MEDICINE

## 2022-11-24 PROCEDURE — 21400001 HC TELEMETRY ROOM

## 2022-11-24 PROCEDURE — 25000003 PHARM REV CODE 250: Performed by: UROLOGY

## 2022-11-24 PROCEDURE — 93010 EKG 12-LEAD: ICD-10-PCS | Mod: ,,, | Performed by: INTERNAL MEDICINE

## 2022-11-24 PROCEDURE — 80048 BASIC METABOLIC PNL TOTAL CA: CPT | Performed by: HOSPITALIST

## 2022-11-24 PROCEDURE — 94761 N-INVAS EAR/PLS OXIMETRY MLT: CPT

## 2022-11-24 PROCEDURE — 63600175 PHARM REV CODE 636 W HCPCS: Performed by: HOSPITALIST

## 2022-11-24 PROCEDURE — 99233 SBSQ HOSP IP/OBS HIGH 50: CPT | Mod: ,,, | Performed by: UROLOGY

## 2022-11-24 PROCEDURE — 25000003 PHARM REV CODE 250: Performed by: INTERNAL MEDICINE

## 2022-11-24 PROCEDURE — 93005 ELECTROCARDIOGRAM TRACING: CPT

## 2022-11-24 PROCEDURE — 85027 COMPLETE CBC AUTOMATED: CPT | Performed by: HOSPITALIST

## 2022-11-24 PROCEDURE — 84100 ASSAY OF PHOSPHORUS: CPT | Performed by: HOSPITALIST

## 2022-11-24 PROCEDURE — 25000003 PHARM REV CODE 250: Performed by: NURSE PRACTITIONER

## 2022-11-24 PROCEDURE — 25000242 PHARM REV CODE 250 ALT 637 W/ HCPCS: Performed by: UROLOGY

## 2022-11-24 PROCEDURE — 27000221 HC OXYGEN, UP TO 24 HOURS

## 2022-11-24 PROCEDURE — 25000003 PHARM REV CODE 250: Performed by: HOSPITALIST

## 2022-11-24 PROCEDURE — 94640 AIRWAY INHALATION TREATMENT: CPT

## 2022-11-24 PROCEDURE — 63600175 PHARM REV CODE 636 W HCPCS: Performed by: UROLOGY

## 2022-11-24 PROCEDURE — 93010 ELECTROCARDIOGRAM REPORT: CPT | Mod: ,,, | Performed by: INTERNAL MEDICINE

## 2022-11-24 PROCEDURE — 99233 PR SUBSEQUENT HOSPITAL CARE,LEVL III: ICD-10-PCS | Mod: ,,, | Performed by: HOSPITALIST

## 2022-11-24 PROCEDURE — 84145 PROCALCITONIN (PCT): CPT | Performed by: INTERNAL MEDICINE

## 2022-11-24 PROCEDURE — 99233 PR SUBSEQUENT HOSPITAL CARE,LEVL III: ICD-10-PCS | Mod: ,,, | Performed by: UROLOGY

## 2022-11-24 PROCEDURE — 99900035 HC TECH TIME PER 15 MIN (STAT)

## 2022-11-24 PROCEDURE — 83735 ASSAY OF MAGNESIUM: CPT | Performed by: HOSPITALIST

## 2022-11-24 RX ORDER — LOSARTAN POTASSIUM 50 MG/1
100 TABLET ORAL DAILY
Status: DISCONTINUED | OUTPATIENT
Start: 2022-11-24 | End: 2022-11-28

## 2022-11-24 RX ORDER — FUROSEMIDE 40 MG/1
40 TABLET ORAL 2 TIMES DAILY
Status: DISCONTINUED | OUTPATIENT
Start: 2022-11-24 | End: 2022-11-28

## 2022-11-24 RX ORDER — BENZONATATE 100 MG/1
100 CAPSULE ORAL 3 TIMES DAILY PRN
Status: DISCONTINUED | OUTPATIENT
Start: 2022-11-25 | End: 2022-12-01 | Stop reason: HOSPADM

## 2022-11-24 RX ORDER — METOPROLOL TARTRATE 1 MG/ML
5 INJECTION, SOLUTION INTRAVENOUS ONCE
Status: COMPLETED | OUTPATIENT
Start: 2022-11-24 | End: 2022-11-24

## 2022-11-24 RX ORDER — FUROSEMIDE 40 MG/1
40 TABLET ORAL DAILY
Status: DISCONTINUED | OUTPATIENT
Start: 2022-11-24 | End: 2022-11-24

## 2022-11-24 RX ADMIN — DIGOXIN 125 MCG: 125 TABLET ORAL at 08:11

## 2022-11-24 RX ADMIN — INSULIN DETEMIR 22 UNITS: 100 INJECTION, SOLUTION SUBCUTANEOUS at 08:11

## 2022-11-24 RX ADMIN — ASPIRIN 81 MG CHEWABLE TABLET 81 MG: 81 TABLET CHEWABLE at 08:11

## 2022-11-24 RX ADMIN — MORPHINE SULFATE 6 MG: 2 INJECTION, SOLUTION INTRAMUSCULAR; INTRAVENOUS at 06:11

## 2022-11-24 RX ADMIN — IPRATROPIUM BROMIDE AND ALBUTEROL SULFATE 3 ML: 2.5; .5 SOLUTION RESPIRATORY (INHALATION) at 07:11

## 2022-11-24 RX ADMIN — OXYCODONE 15 MG: 5 TABLET ORAL at 08:11

## 2022-11-24 RX ADMIN — METRONIDAZOLE 500 MG: 500 TABLET ORAL at 02:11

## 2022-11-24 RX ADMIN — FUROSEMIDE 40 MG: 40 TABLET ORAL at 08:11

## 2022-11-24 RX ADMIN — MORPHINE SULFATE 6 MG: 2 INJECTION, SOLUTION INTRAMUSCULAR; INTRAVENOUS at 02:11

## 2022-11-24 RX ADMIN — FOLIC ACID 1 MG: 1 TABLET ORAL at 08:11

## 2022-11-24 RX ADMIN — NYSTATIN 500000 UNITS: 500000 SUSPENSION ORAL at 08:11

## 2022-11-24 RX ADMIN — METRONIDAZOLE 500 MG: 500 TABLET ORAL at 05:11

## 2022-11-24 RX ADMIN — OXYCODONE 15 MG: 5 TABLET ORAL at 03:11

## 2022-11-24 RX ADMIN — LOSARTAN POTASSIUM 100 MG: 50 TABLET, FILM COATED ORAL at 08:11

## 2022-11-24 RX ADMIN — CEFEPIME HYDROCHLORIDE 1 G: 1 INJECTION, SOLUTION INTRAVENOUS at 05:11

## 2022-11-24 RX ADMIN — METOROPROLOL TARTRATE 5 MG: 5 INJECTION, SOLUTION INTRAVENOUS at 11:11

## 2022-11-24 RX ADMIN — OXYCODONE 15 MG: 5 TABLET ORAL at 09:11

## 2022-11-24 RX ADMIN — EZETIMIBE 10 MG: 10 TABLET ORAL at 08:11

## 2022-11-24 RX ADMIN — MORPHINE SULFATE 6 MG: 2 INJECTION, SOLUTION INTRAMUSCULAR; INTRAVENOUS at 12:11

## 2022-11-24 RX ADMIN — ACETAMINOPHEN 650 MG: 325 TABLET, FILM COATED ORAL at 12:11

## 2022-11-24 RX ADMIN — NYSTATIN 500000 UNITS: 500000 SUSPENSION ORAL at 12:11

## 2022-11-24 RX ADMIN — NYSTATIN 500000 UNITS: 500000 SUSPENSION ORAL at 05:11

## 2022-11-24 RX ADMIN — OXYCODONE 15 MG: 5 TABLET ORAL at 04:11

## 2022-11-24 RX ADMIN — CEFEPIME HYDROCHLORIDE 1 G: 1 INJECTION, SOLUTION INTRAVENOUS at 12:11

## 2022-11-24 RX ADMIN — METRONIDAZOLE 500 MG: 500 TABLET ORAL at 09:11

## 2022-11-24 RX ADMIN — INSULIN ASPART 4 UNITS: 100 INJECTION, SOLUTION INTRAVENOUS; SUBCUTANEOUS at 01:11

## 2022-11-24 RX ADMIN — OXYCODONE 15 MG: 5 TABLET ORAL at 12:11

## 2022-11-24 RX ADMIN — INSULIN ASPART 1 UNITS: 100 INJECTION, SOLUTION INTRAVENOUS; SUBCUTANEOUS at 11:11

## 2022-11-24 RX ADMIN — INSULIN ASPART 2 UNITS: 100 INJECTION, SOLUTION INTRAVENOUS; SUBCUTANEOUS at 06:11

## 2022-11-24 RX ADMIN — MORPHINE SULFATE 6 MG: 2 INJECTION, SOLUTION INTRAMUSCULAR; INTRAVENOUS at 10:11

## 2022-11-24 RX ADMIN — THERA TABS 1 TABLET: TAB at 08:11

## 2022-11-24 RX ADMIN — CEFEPIME HYDROCHLORIDE 1 G: 1 INJECTION, SOLUTION INTRAVENOUS at 08:11

## 2022-11-24 RX ADMIN — INSULIN ASPART 2 UNITS: 100 INJECTION, SOLUTION INTRAVENOUS; SUBCUTANEOUS at 08:11

## 2022-11-24 NOTE — ASSESSMENT & PLAN NOTE
66 yo male who presented with Mary's gangrene of scrotum.    - Admitted to hospital medicine, appreciate assistance  - Debrided 11/15 and 11/17 in OR. Medial aspect of wound debrided at bedside 11/22.  - Ricketts removed 11/21. Patient voiding.  - Diet, insulin and pain control per primary    - Continue antibiotics per infectious disease recs. On cefepime/flagyl.  - Anaerobic culture with Anaerococcus murdochii and Prevotella bucchalis.  - Dressing changes BID. Wound care following.  - Rest of care per primary    Monitor perineum closely; will reimage with CT if this area progresses; nothing in need of  I&D or debridement at this time

## 2022-11-24 NOTE — ASSESSMENT & PLAN NOTE
- HgbA1c of 7.6  - Continue cardiac/diabetic diet.  - Glucoses levels reviewed, are overall at targets   - Increase detemir to 22U subQ daily, and moderate-dose sliding scale insulin aspart 1-10U subQ TIDWM PRN.  - Will adjust insulin regimen as needed to achieve glucose between 140 - 180

## 2022-11-24 NOTE — SUBJECTIVE & OBJECTIVE
Interval History: No acute event, reports feeling worse today and stated he had subjective fever but highest temp recorded 99.2F.    Review of Systems   Constitutional:  Positive for fever. Negative for chills.   Respiratory:  Negative for cough and shortness of breath.    Cardiovascular:  Negative for chest pain and palpitations.   Genitourinary:  Positive for scrotal swelling.   Objective:     Vital Signs (Most Recent):  Temp: 99 °F (37.2 °C) (11/23/22 1958)  Pulse: 92 (11/23/22 1958)  Resp: 18 (11/23/22 2026)  BP: 131/73 (11/23/22 1958)  SpO2: (!) 94 % (11/23/22 1958)   Vital Signs (24h Range):  Temp:  [97.9 °F (36.6 °C)-99.2 °F (37.3 °C)] 99 °F (37.2 °C)  Pulse:  [86-99] 92  Resp:  [15-19] 18  SpO2:  [93 %-97 %] 94 %  BP: (131-148)/(73-91) 131/73     Weight: 114.1 kg (251 lb 8.7 oz)  Body mass index is 35.08 kg/m².    Intake/Output Summary (Last 24 hours) at 11/23/2022 2215  Last data filed at 11/23/2022 0433  Gross per 24 hour   Intake 50 ml   Output 1200 ml   Net -1150 ml        Physical Exam  Constitutional:       General: He is sleeping.      Appearance: He is well-developed. He is obese.   HENT:      Head: Normocephalic and atraumatic.      Nose: Nose normal.   Eyes:      Extraocular Movements: Extraocular movements intact.      Conjunctiva/sclera: Conjunctivae normal.   Cardiovascular:      Rate and Rhythm: Normal rate and regular rhythm.      Pulses:           Radial pulses are 2+ on the right side and 2+ on the left side.      Heart sounds: Normal heart sounds.   Pulmonary:      Effort: Pulmonary effort is normal.      Breath sounds: Normal breath sounds.   Abdominal:      General: Bowel sounds are normal. There is no distension.      Palpations: Abdomen is soft.      Tenderness: There is no abdominal tenderness. There is no guarding or rebound.   Musculoskeletal:         General: Normal range of motion.      Cervical back: Normal range of motion and neck supple.   Skin:     General: Skin is warm and  dry.      Comments: Packing noted to left scrotum and an area of dark spot on right scrotum   Neurological:      Mental Status: He is oriented to person, place, and time.      Motor: Motor function is intact.   Psychiatric:         Mood and Affect: Mood normal.         Behavior: Behavior normal.         Thought Content: Thought content normal.       Significant Labs: All pertinent labs within the past 24 hours have been reviewed.    Significant Imaging: I have reviewed all pertinent imaging results/findings within the past 24 hours.

## 2022-11-24 NOTE — PLAN OF CARE
POC reviewed w/ pt. AAOx4. VSS on 2L NC. C/o pain treated w/ PRN pain medication per MAR. Pt voids and shifts in bed independently. Instructed to call for help ambulating. No injuries, falls, or trauma occurred during shift. Purposeful rounding completed. Bed low and locked, side rails up x3, call light within reach.

## 2022-11-24 NOTE — SUBJECTIVE & OBJECTIVE
Interval History: NAEON    Review of Systems  Objective:     Temp:  [97.9 °F (36.6 °C)-99 °F (37.2 °C)] 98.4 °F (36.9 °C)  Pulse:  [86-96] 86  Resp:  [16-20] 18  SpO2:  [94 %-98 %] 98 %  BP: (131-148)/(73-94) 144/82     Body mass index is 35.08 kg/m².    Date 11/24/22 0700 - 11/25/22 0659   Shift 9676-7607 4445-9848 8637-2099 24 Hour Total   INTAKE   Shift Total(mL/kg)       OUTPUT   Urine(mL/kg/hr) 550   550   Shift Total(mL/kg) 550(4.8)   550(4.8)   Weight (kg) 114.1 114.1 114.1 114.1          Drains       None                   Physical Exam    Ao*4  resp normal rate' breathing not labored  cv normal rate  Ab nondistended  Ext no edema  Skin edges viable  No necrosis  Some new induration in perineum but no fluctuance and no crepitance    Significant Labs:    BMP:  Recent Labs   Lab 11/18/22  0351 11/20/22  1238 11/24/22  0615    140 138   K 4.3 4.4 4.4    104 103   CO2 28 28 29   BUN 19 7* 4*   CREATININE 0.8 0.7 0.8   CALCIUM 7.6* 8.1* 8.5*       CBC:   Recent Labs   Lab 11/18/22  0351 11/20/22  1238 11/24/22  0615   WBC 7.90 9.71 5.31   HGB 14.1 14.3 17.7   HCT 43.5 44.3 56.7*    196 224       Blood Culture:   Recent Labs   Lab 11/23/22  1232   LABBLOO No Growth to date       Significant Imaging:  -

## 2022-11-24 NOTE — ASSESSMENT & PLAN NOTE
Mary's Gangrene of Scrotum  Leukocytosis  Lactic acidosis  Elevated troponin  - Patient criteria for sepsis with infective focus of Mary gangrene of the scrotum  - Lactic acid level 4.4, procalcitonin >48, WBC 24, and elevated troponin levels that trended down  - Urology consulted and patient underwent excision and debridement of necrotic scrotal tissue on 11/15; repeat debridement 11/17  - Patient was on cefepime 1g IV q.12, clindamycin 900 mg IV Q 8, metronidazole 500 mg IV Q 8 and vancomycin dosed by pharmacy  - Elevated troponins likely secondary to sepsis and setting of DONNA given no reported chest pain and no acute EKG changes  - Blood cultures no growth to date and wound cultures with Anaerococcus and Prevotella species  - Wound care ordered and Urology following  - Clinically was improving with resolution of lactic acidosis and definitive debridement  - Adjust pain medication regimen with addition of medication for breakthrough pain  - ID consult appreciated, and clindamycin and vancomycin stopped on 11/21  - Ricketts removed on 11/21  - Urology performed beside debridement on 11/22  - Clinically worse today and repeat blood cultures and CXR done today, will also check UA  - Follow up studies and await sensitivities

## 2022-11-24 NOTE — PROGRESS NOTES
[facility] Medicine  Progress Note    Patient Name: Nithin Wagner  MRN: [id number]  Patient Class: IP- Inpatient   Admission Date: 11/15/2022  Length of Stay: 8 days  Attending Physician: Darby Obrien MD  Primary Care Provider: Tushar Drew MD        Subjective:     Principal Problem:Sepsis        HPI:  The patient is a 67M with a past medical history of DM2, CAD, HTN, and HLD who presents with scrotal pain and swelling. Started about 4 weeks ago and has been progressively worsening despite a couple courses of antibiotics (unsure which ones). He reports fevers and chills at home, nausea, decreased PO intake, and multiple syncopal episodes with +incontinence of stool. Unable to urinate for the past 24 hours or so. Sugars 190s at home. He was seen yesterday in urgent care and referred to the ER, but says he wasn't able to make it at that time so he comes today instead. On initial workup, the patient has CT evidence of Fourniers gangrene.  He will be brought to the operating room emergently and admitted after to ICU for further management.      Overview/Hospital Course:  Mr. Wagner presented with scrotal pain and swelling.  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  Empiric initiated with cefepime, clindamycin, metronidazole, and vancomycin.  Urology consulted and he underwent emergent excision and debridement of necrotic tissue on 11/15. Repeat washout performed 11/17. Improved and stepped down from ICU 11/18.      Interval History: No acute event, reports feeling worse today and stated he had subjective fever but highest temp recorded 99.2F.    Review of Systems   Constitutional:  Positive for fever. Negative for chills.   Respiratory:  Negative for cough and shortness of breath.    Cardiovascular:  Negative for chest pain and palpitations.   Genitourinary:  Positive for scrotal swelling.   Objective:     Vital Signs (Most Recent):  Temp: 99 °F (37.2 °C) (11/23/22 1958)  Pulse:  92 (11/23/22 1958)  Resp: 18 (11/23/22 2026)  BP: 131/73 (11/23/22 1958)  SpO2: (!) 94 % (11/23/22 1958)   Vital Signs (24h Range):  Temp:  [97.9 °F (36.6 °C)-99.2 °F (37.3 °C)] 99 °F (37.2 °C)  Pulse:  [86-99] 92  Resp:  [15-19] 18  SpO2:  [93 %-97 %] 94 %  BP: (131-148)/(73-91) 131/73     Weight: 114.1 kg (251 lb 8.7 oz)  Body mass index is 35.08 kg/m².    Intake/Output Summary (Last 24 hours) at 11/23/2022 2215  Last data filed at 11/23/2022 0433  Gross per 24 hour   Intake 50 ml   Output 1200 ml   Net -1150 ml        Physical Exam  Constitutional:       General: He is sleeping.      Appearance: He is well-developed. He is obese.   HENT:      Head: Normocephalic and atraumatic.      Nose: Nose normal.   Eyes:      Extraocular Movements: Extraocular movements intact.      Conjunctiva/sclera: Conjunctivae normal.   Cardiovascular:      Rate and Rhythm: Normal rate and regular rhythm.      Pulses:           Radial pulses are 2+ on the right side and 2+ on the left side.      Heart sounds: Normal heart sounds.   Pulmonary:      Effort: Pulmonary effort is normal.      Breath sounds: Normal breath sounds.   Abdominal:      General: Bowel sounds are normal. There is no distension.      Palpations: Abdomen is soft.      Tenderness: There is no abdominal tenderness. There is no guarding or rebound.   Musculoskeletal:         General: Normal range of motion.      Cervical back: Normal range of motion and neck supple.   Skin:     General: Skin is warm and dry.      Comments: Packing noted to left scrotum and an area of dark spot on right scrotum   Neurological:      Mental Status: He is oriented to person, place, and time.      Motor: Motor function is intact.   Psychiatric:         Mood and Affect: Mood normal.         Behavior: Behavior normal.         Thought Content: Thought content normal.       Significant Labs: All pertinent labs within the past 24 hours have been reviewed.    Significant Imaging: I have reviewed  all pertinent imaging results/findings within the past 24 hours.      Assessment/Plan:      * Sepsis  Mary's Gangrene of Scrotum  Leukocytosis  Lactic acidosis  Elevated troponin  - Patient criteria for sepsis with infective focus of Mary gangrene of the scrotum  - Lactic acid level 4.4, procalcitonin >48, WBC 24, and elevated troponin levels that trended down  - Urology consulted and patient underwent excision and debridement of necrotic scrotal tissue on 11/15; repeat debridement 11/17  - Patient was on cefepime 1g IV q.12, clindamycin 900 mg IV Q 8, metronidazole 500 mg IV Q 8 and vancomycin dosed by pharmacy  - Elevated troponins likely secondary to sepsis and setting of DONNA given no reported chest pain and no acute EKG changes  - Blood cultures no growth to date and wound cultures with Anaerococcus and Prevotella species  - Wound care ordered and Urology following  - Clinically was improving with resolution of lactic acidosis and definitive debridement  - Adjust pain medication regimen with addition of medication for breakthrough pain  - ID consult appreciated, and clindamycin and vancomycin stopped on 11/21  - Ricketts removed on 11/21  - Urology performed beside debridement on 11/22  - Clinically worse today and repeat blood cultures and CXR done today, will also check UA  - Follow up studies and await sensitivities    DONNA (acute kidney injury)  - Creatinine 2.5, baseline- 1.3; likely due to hypotension/sepsis  - Improved with IV fluids and treatment of sepsis  - Creatinine down to normal levels  - Continue monitor with repeat labs and follow I/O's    Chronic diastolic heart failure  CAD  - Last echo with normal EF and grade 1 diastolic dysfunction on 10/28/2022  - No overt signs of volume overload and no reported chest pain  - Elevated troponin due to sepsis with associated hypotension along with DONNA and CHF which were trended down and no ectopy noted on telemetry, and no acute EKG changes  - Continue  "digoxin 125 mcg p.o. daily, ASA 81 mg p.o. daily and ezetimibe 10 mg p.o. 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  - Glucoses levels reviewed, are overall at targets   - Increase detemir to 22U subQ daily, and moderate-dose sliding scale insulin aspart 1-10U subQ TIDWM PRN.  - Will adjust insulin regimen as needed to achieve glucose between 140 - 180    Essential hypertension  - Hypotensive was on admit that responded to treatment with IV fluids with no need for pressor support  - Blood pressure predominantly in normal range; monitor.  - Continue to hold antihypertensives for now.      VTE Risk Mitigation (From admission, onward)         Ordered     IP VTE HIGH RISK PATIENT  Once         11/15/22 1948     Reason for No Pharmacological VTE Prophylaxis  Once        Question:  Reasons:  Answer:  Risk of Bleeding    11/15/22 1948     Place sequential compression device  Until discontinued         11/15/22 1936                        Darby Obrien MD  Department of Hospital Medicine   [facility] ([place])  "

## 2022-11-24 NOTE — PLAN OF CARE
Problem: Adult Inpatient Plan of Care  Goal: Plan of Care Review  Outcome: Ongoing, Progressing  Goal: Patient-Specific Goal (Individualized)  Outcome: Ongoing, Progressing  Goal: Absence of Hospital-Acquired Illness or Injury  Outcome: Ongoing, Progressing  Goal: Optimal Comfort and Wellbeing  Outcome: Ongoing, Progressing  Goal: Readiness for Transition of Care  Outcome: Ongoing, Progressing     Problem: Impaired Wound Healing  Goal: Optimal Wound Healing  Outcome: Ongoing, Progressing     Problem: Adjustment to Illness (Sepsis/Septic Shock)  Goal: Optimal Coping  Outcome: Ongoing, Progressing     Problem: Bleeding (Sepsis/Septic Shock)  Goal: Absence of Bleeding  Outcome: Ongoing, Progressing     Problem: Glycemic Control Impaired (Sepsis/Septic Shock)  Goal: Blood Glucose Level Within Desired Range  Outcome: Ongoing, Progressing     Problem: Infection Progression (Sepsis/Septic Shock)  Goal: Absence of Infection Signs and Symptoms  Outcome: Ongoing, Progressing     Problem: Nutrition Impaired (Sepsis/Septic Shock)  Goal: Optimal Nutrition Intake  Outcome: Ongoing, Progressing     Problem: Fluid and Electrolyte Imbalance (Acute Kidney Injury/Impairment)  Goal: Fluid and Electrolyte Balance  Outcome: Ongoing, Progressing     Problem: Oral Intake Inadequate (Acute Kidney Injury/Impairment)  Goal: Optimal Nutrition Intake  Outcome: Ongoing, Progressing     Problem: Renal Function Impairment (Acute Kidney Injury/Impairment)  Goal: Effective Renal Function  Outcome: Ongoing, Progressing     Problem: Diabetes Comorbidity  Goal: Blood Glucose Level Within Targeted Range  Outcome: Ongoing, Progressing     Problem: Infection  Goal: Absence of Infection Signs and Symptoms  Outcome: Ongoing, Progressing     Problem: Skin Injury Risk Increased  Goal: Skin Health and Integrity  Outcome: Ongoing, Progressing     Problem: Fall Injury Risk  Goal: Absence of Fall and Fall-Related Injury  Outcome: Ongoing, Progressing

## 2022-11-24 NOTE — PROGRESS NOTES
[facility] ([place])  Urology  Progress Note    Patient Name: Nithin Wagner  MRN: [id number]  Admission Date: 11/15/2022  Hospital Length of Stay: 9 days  Code Status: Full Code   Attending Provider: Darby Obrien MD   Primary Care Physician: Tushar Drew MD    Subjective:     HPI:  67M h/o DM2, CAD, HTN, HLD presents with scrotal pain and swelling. Started about 4 weeks ago and has been progressively worsening despite a couple courses of antibiotics (unsure which ones). He reports fevers and chills at home. He also reports nausea, decreased PO intake, multiple syncopal episodes, and stool incontinence. He is on continuous blood glucose monitoring and states blood sugars have been in 190s today. He was seen yesterday in urgent care and diagnosed with scrotal abscess with possible kami's; he referred to the ER but waited until today to come in.    He was hypotensive and tachycardic on arrival in the ED. Sepsis protocol was started. Labs and imaging are currently pending.         Interval History: NAEON    Review of Systems  Objective:     Temp:  [97.9 °F (36.6 °C)-99 °F (37.2 °C)] 98.4 °F (36.9 °C)  Pulse:  [86-96] 86  Resp:  [16-20] 18  SpO2:  [94 %-98 %] 98 %  BP: (131-148)/(73-94) 144/82     Body mass index is 35.08 kg/m².    Date 11/24/22 0700 - 11/25/22 0659   Shift 8549-8045 1539-4479 1966-9433 24 Hour Total   INTAKE   Shift Total(mL/kg)       OUTPUT   Urine(mL/kg/hr) 550   550   Shift Total(mL/kg) 550(4.8)   550(4.8)   Weight (kg) 114.1 114.1 114.1 114.1          Drains       None                   Physical Exam    Ao*4  resp normal rate' breathing not labored  cv normal rate  Ab nondistended  Ext no edema  Skin edges viable  No necrosis  Some new induration in perineum but no fluctuance and no crepitance    Significant Labs:    BMP:  Recent Labs   Lab 11/18/22  0351 11/20/22  1238 11/24/22  0615    140 138   K 4.3 4.4 4.4    104 103   CO2 28 28 29   BUN 19 7* 4*   CREATININE 0.8 0.7 0.8    CALCIUM 7.6* 8.1* 8.5*       CBC:   Recent Labs   Lab 11/18/22  0351 11/20/22  1238 11/24/22  0615   WBC 7.90 9.71 5.31   HGB 14.1 14.3 17.7   HCT 43.5 44.3 56.7*    196 224       Blood Culture:   Recent Labs   Lab 11/23/22  1232   LABBLOO No Growth to date       Significant Imaging:  -                    Assessment/Plan:     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    - Admitted to [facility] medicine, appreciate assistance  - Debrided 11/15 and 11/17 in OR. Medial aspect of wound debrided at bedside 11/22.  - Ricketts removed 11/21. Patient voiding.  - Diet, insulin and pain control per primary    - Continue antibiotics per infectious disease recs. On cefepime/flagyl.  - Anaerobic culture with Anaerococcus murdochii and Prevotella bucchalis.  - Dressing changes BID. Wound care following.  - Rest of care per primary    Monitor perineum closely; will reimage with CT if this area progresses; nothing in need of  I&D or debridement at this time        VTE Risk Mitigation (From admission, onward)         Ordered     IP VTE HIGH RISK PATIENT  Once         11/15/22 1948     Reason for No Pharmacological VTE Prophylaxis  Once        Question:  Reasons:  Answer:  Risk of Bleeding    11/15/22 1948     Place sequential compression device  Until discontinued         11/15/22 1936                Herminio Johnson MD  Urology  [facility] ([place])

## 2022-11-25 PROBLEM — I47.10 SVT (SUPRAVENTRICULAR TACHYCARDIA): Status: ACTIVE | Noted: 2022-11-25

## 2022-11-25 LAB
ANION GAP SERPL CALC-SCNC: 9 MMOL/L (ref 8–16)
BACTERIA UR CULT: NO GROWTH
BASOPHILS # BLD AUTO: 0.02 K/UL (ref 0–0.2)
BASOPHILS NFR BLD: 0.3 % (ref 0–1.9)
BUN SERPL-MCNC: 5 MG/DL (ref 8–23)
CALCIUM SERPL-MCNC: 8.6 MG/DL (ref 8.7–10.5)
CHLORIDE SERPL-SCNC: 98 MMOL/L (ref 95–110)
CO2 SERPL-SCNC: 30 MMOL/L (ref 23–29)
CREAT SERPL-MCNC: 0.8 MG/DL (ref 0.5–1.4)
DIFFERENTIAL METHOD: ABNORMAL
EOSINOPHIL # BLD AUTO: 0.1 K/UL (ref 0–0.5)
EOSINOPHIL NFR BLD: 1.3 % (ref 0–8)
ERYTHROCYTE [DISTWIDTH] IN BLOOD BY AUTOMATED COUNT: 12.9 % (ref 11.5–14.5)
EST. GFR  (NO RACE VARIABLE): >60 ML/MIN/1.73 M^2
GLUCOSE SERPL-MCNC: 210 MG/DL (ref 70–110)
HCT VFR BLD AUTO: 45.2 % (ref 40–54)
HGB BLD-MCNC: 14.9 G/DL (ref 14–18)
IMM GRANULOCYTES # BLD AUTO: 0.18 K/UL (ref 0–0.04)
IMM GRANULOCYTES NFR BLD AUTO: 2.4 % (ref 0–0.5)
LYMPHOCYTES # BLD AUTO: 1.3 K/UL (ref 1–4.8)
LYMPHOCYTES NFR BLD: 17.2 % (ref 18–48)
MAGNESIUM SERPL-MCNC: 1.8 MG/DL (ref 1.6–2.6)
MCH RBC QN AUTO: 33.6 PG (ref 27–31)
MCHC RBC AUTO-ENTMCNC: 33 G/DL (ref 32–36)
MCV RBC AUTO: 102 FL (ref 82–98)
MONOCYTES # BLD AUTO: 0.7 K/UL (ref 0.3–1)
MONOCYTES NFR BLD: 9.8 % (ref 4–15)
NEUTROPHILS # BLD AUTO: 5.2 K/UL (ref 1.8–7.7)
NEUTROPHILS NFR BLD: 69 % (ref 38–73)
NRBC BLD-RTO: 0 /100 WBC
PHOSPHATE SERPL-MCNC: 3.8 MG/DL (ref 2.7–4.5)
PLATELET # BLD AUTO: 321 K/UL (ref 150–450)
PMV BLD AUTO: 11.4 FL (ref 9.2–12.9)
POCT GLUCOSE: 162 MG/DL (ref 70–110)
POCT GLUCOSE: 187 MG/DL (ref 70–110)
POCT GLUCOSE: 190 MG/DL (ref 70–110)
POCT GLUCOSE: 199 MG/DL (ref 70–110)
POCT GLUCOSE: 210 MG/DL (ref 70–110)
POTASSIUM SERPL-SCNC: 4.1 MMOL/L (ref 3.5–5.1)
RBC # BLD AUTO: 4.44 M/UL (ref 4.6–6.2)
SODIUM SERPL-SCNC: 137 MMOL/L (ref 136–145)
WBC # BLD AUTO: 7.55 K/UL (ref 3.9–12.7)

## 2022-11-25 PROCEDURE — 25000003 PHARM REV CODE 250: Performed by: INTERNAL MEDICINE

## 2022-11-25 PROCEDURE — 63600175 PHARM REV CODE 636 W HCPCS: Performed by: HOSPITALIST

## 2022-11-25 PROCEDURE — 21400001 HC TELEMETRY ROOM

## 2022-11-25 PROCEDURE — 99232 PR SUBSEQUENT HOSPITAL CARE,LEVL II: ICD-10-PCS | Mod: GC,,, | Performed by: UROLOGY

## 2022-11-25 PROCEDURE — 25000003 PHARM REV CODE 250: Performed by: UROLOGY

## 2022-11-25 PROCEDURE — 99233 PR SUBSEQUENT HOSPITAL CARE,LEVL III: ICD-10-PCS | Mod: ,,, | Performed by: INTERNAL MEDICINE

## 2022-11-25 PROCEDURE — 84100 ASSAY OF PHOSPHORUS: CPT | Performed by: HOSPITALIST

## 2022-11-25 PROCEDURE — 99233 PR SUBSEQUENT HOSPITAL CARE,LEVL III: ICD-10-PCS | Mod: ,,, | Performed by: HOSPITALIST

## 2022-11-25 PROCEDURE — 94761 N-INVAS EAR/PLS OXIMETRY MLT: CPT

## 2022-11-25 PROCEDURE — 83735 ASSAY OF MAGNESIUM: CPT | Performed by: HOSPITALIST

## 2022-11-25 PROCEDURE — 80048 BASIC METABOLIC PNL TOTAL CA: CPT | Performed by: HOSPITALIST

## 2022-11-25 PROCEDURE — 25000003 PHARM REV CODE 250: Performed by: HOSPITALIST

## 2022-11-25 PROCEDURE — 99233 SBSQ HOSP IP/OBS HIGH 50: CPT | Mod: ,,, | Performed by: INTERNAL MEDICINE

## 2022-11-25 PROCEDURE — 63600175 PHARM REV CODE 636 W HCPCS: Performed by: INTERNAL MEDICINE

## 2022-11-25 PROCEDURE — 99223 1ST HOSP IP/OBS HIGH 75: CPT | Mod: ,,, | Performed by: INTERNAL MEDICINE

## 2022-11-25 PROCEDURE — 99233 SBSQ HOSP IP/OBS HIGH 50: CPT | Mod: ,,, | Performed by: HOSPITALIST

## 2022-11-25 PROCEDURE — 99232 SBSQ HOSP IP/OBS MODERATE 35: CPT | Mod: GC,,, | Performed by: UROLOGY

## 2022-11-25 PROCEDURE — 63600175 PHARM REV CODE 636 W HCPCS: Performed by: NURSE PRACTITIONER

## 2022-11-25 PROCEDURE — 36415 COLL VENOUS BLD VENIPUNCTURE: CPT | Performed by: HOSPITALIST

## 2022-11-25 PROCEDURE — 85025 COMPLETE CBC W/AUTO DIFF WBC: CPT | Performed by: HOSPITALIST

## 2022-11-25 PROCEDURE — 99223 PR INITIAL HOSPITAL CARE,LEVL III: ICD-10-PCS | Mod: ,,, | Performed by: INTERNAL MEDICINE

## 2022-11-25 RX ORDER — METOPROLOL TARTRATE 25 MG/1
25 TABLET, FILM COATED ORAL EVERY 6 HOURS SCHEDULED
Status: DISCONTINUED | OUTPATIENT
Start: 2022-11-25 | End: 2022-11-26

## 2022-11-25 RX ORDER — METOPROLOL TARTRATE 1 MG/ML
5 INJECTION, SOLUTION INTRAVENOUS EVERY 4 HOURS PRN
Status: DISCONTINUED | OUTPATIENT
Start: 2022-11-25 | End: 2022-12-01 | Stop reason: HOSPADM

## 2022-11-25 RX ORDER — INSULIN ASPART 100 [IU]/ML
5 INJECTION, SOLUTION INTRAVENOUS; SUBCUTANEOUS
Status: DISCONTINUED | OUTPATIENT
Start: 2022-11-26 | End: 2022-11-26

## 2022-11-25 RX ADMIN — MORPHINE SULFATE 6 MG: 2 INJECTION, SOLUTION INTRAMUSCULAR; INTRAVENOUS at 12:11

## 2022-11-25 RX ADMIN — DIGOXIN 125 MCG: 125 TABLET ORAL at 09:11

## 2022-11-25 RX ADMIN — METRONIDAZOLE 500 MG: 500 TABLET ORAL at 09:11

## 2022-11-25 RX ADMIN — NYSTATIN 500000 UNITS: 500000 SUSPENSION ORAL at 12:11

## 2022-11-25 RX ADMIN — NYSTATIN 500000 UNITS: 500000 SUSPENSION ORAL at 09:11

## 2022-11-25 RX ADMIN — MORPHINE SULFATE 6 MG: 2 INJECTION, SOLUTION INTRAMUSCULAR; INTRAVENOUS at 04:11

## 2022-11-25 RX ADMIN — FUROSEMIDE 40 MG: 40 TABLET ORAL at 08:11

## 2022-11-25 RX ADMIN — FOLIC ACID 1 MG: 1 TABLET ORAL at 09:11

## 2022-11-25 RX ADMIN — INSULIN ASPART 4 UNITS: 100 INJECTION, SOLUTION INTRAVENOUS; SUBCUTANEOUS at 05:11

## 2022-11-25 RX ADMIN — FUROSEMIDE 40 MG: 40 TABLET ORAL at 05:11

## 2022-11-25 RX ADMIN — NYSTATIN 500000 UNITS: 500000 SUSPENSION ORAL at 08:11

## 2022-11-25 RX ADMIN — OXYCODONE 15 MG: 5 TABLET ORAL at 09:11

## 2022-11-25 RX ADMIN — INSULIN ASPART 2 UNITS: 100 INJECTION, SOLUTION INTRAVENOUS; SUBCUTANEOUS at 10:11

## 2022-11-25 RX ADMIN — NYSTATIN 500000 UNITS: 500000 SUSPENSION ORAL at 04:11

## 2022-11-25 RX ADMIN — ASPIRIN 81 MG CHEWABLE TABLET 81 MG: 81 TABLET CHEWABLE at 08:11

## 2022-11-25 RX ADMIN — INSULIN ASPART 4 UNITS: 100 INJECTION, SOLUTION INTRAVENOUS; SUBCUTANEOUS at 01:11

## 2022-11-25 RX ADMIN — CEFEPIME HYDROCHLORIDE 1 G: 1 INJECTION, SOLUTION INTRAVENOUS at 09:11

## 2022-11-25 RX ADMIN — MORPHINE SULFATE 6 MG: 2 INJECTION, SOLUTION INTRAMUSCULAR; INTRAVENOUS at 08:11

## 2022-11-25 RX ADMIN — OXYCODONE 15 MG: 5 TABLET ORAL at 06:11

## 2022-11-25 RX ADMIN — EZETIMIBE 10 MG: 10 TABLET ORAL at 09:11

## 2022-11-25 RX ADMIN — CEFEPIME HYDROCHLORIDE 1 G: 1 INJECTION, SOLUTION INTRAVENOUS at 04:11

## 2022-11-25 RX ADMIN — METOPROLOL TARTRATE 25 MG: 25 TABLET, FILM COATED ORAL at 08:11

## 2022-11-25 RX ADMIN — ONDANSETRON 4 MG: 2 INJECTION INTRAMUSCULAR; INTRAVENOUS at 09:11

## 2022-11-25 RX ADMIN — ONDANSETRON 4 MG: 2 INJECTION INTRAMUSCULAR; INTRAVENOUS at 12:11

## 2022-11-25 RX ADMIN — MORPHINE SULFATE 6 MG: 2 INJECTION, SOLUTION INTRAMUSCULAR; INTRAVENOUS at 09:11

## 2022-11-25 RX ADMIN — METRONIDAZOLE 500 MG: 500 TABLET ORAL at 06:11

## 2022-11-25 RX ADMIN — ONDANSETRON 4 MG: 2 INJECTION INTRAMUSCULAR; INTRAVENOUS at 04:11

## 2022-11-25 RX ADMIN — THERA TABS 1 TABLET: TAB at 09:11

## 2022-11-25 RX ADMIN — METRONIDAZOLE 500 MG: 500 TABLET ORAL at 02:11

## 2022-11-25 RX ADMIN — LOSARTAN POTASSIUM 100 MG: 50 TABLET, FILM COATED ORAL at 09:11

## 2022-11-25 RX ADMIN — CEFEPIME HYDROCHLORIDE 1 G: 1 INJECTION, SOLUTION INTRAVENOUS at 01:11

## 2022-11-25 RX ADMIN — INSULIN ASPART 2 UNITS: 100 INJECTION, SOLUTION INTRAVENOUS; SUBCUTANEOUS at 09:11

## 2022-11-25 NOTE — PT/OT/SLP PROGRESS
Physical Therapy  Not Seen    Patient Name:  Nithin Wagner   MRN:  [id number]    Patient not seen today secondary to Nurse/ KYLE hold (waiting on cardiology consult). Will follow-up Monday, 11/28.

## 2022-11-25 NOTE — ASSESSMENT & PLAN NOTE
- HgbA1c of 7.6  - Continue cardiac/diabetic diet.  - Glucoses levels reviewed, are overall at targets   - Increase detemir to 28U subQ daily, and moderate-dose sliding scale insulin aspart 1-10U subQ TIDWM PRN.  - Will adjust insulin regimen as needed to achieve glucose between 140 - 180

## 2022-11-25 NOTE — PROGRESS NOTES
Pt HR sustaining 155, reports he has history of paroxysmal SVTs and sees Dr. Dempsey outpt. BASIA Davis notified. Orders for STAT EKG placed, RRT at bedside with EKG. Telemetry monitoring ordered and in place. All other VSS stable. Pt denies any complaints except feeling fatigued. Resting comfortably in bed, safety precautions in place. Will continue to monitor.

## 2022-11-25 NOTE — PROGRESS NOTES
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  68 yo man with RA admitted with scrotal abscess and Mary's after suffering a leg infection (left thigh) and failing outpatient abx  - s/p debridement x 3  - culture only grew Anaerococcus and Prevotella (though patient was on abx prior to admission)  - feeling better today  - awaiting urine culture  - continue cefepime and Flagyl for now  - wound care per Urology  - will f/u again on Monday  - call for any questions before then      Raj Christianson MD  Infectious Disease  [facility] ([place]    Subjective:     Principal Problem:Sepsis    HPI: 67M h/o DM2, CAD, HTN, HLD presents with scrotal pain and swelling. Started about 4 weeks ago and has been progressively worsening despite a couple courses of antibiotics (most recently Bactrim). He reports fevers and chills at home. He also reports nausea, decreased PO intake, multiple syncopal episodes, and stool incontinence. He is on continuous blood glucose monitoring and states blood sugars have been in 190s today. He was seen PTA in urgent care and diagnosed with scrotal abscess with possible Mary's; he referred to the ER but waited to present. After taking a nap when getting his toothbrush, he woke up confused and in feces. He was hypotensive and tachycardic, on arrival in the ED. Sepsis protocol was started. Labs and imaging are noted c/w scrotal abscess and Mary's. Patient went to OR twice for debridement. Appropriate cultures were obtained and grew Anaerococcus. The patient is currently on cefepime, vancomycin, and Flagyl. ID is consulted for abx recommendations for Mary's.        Interval History: Patient  feeling better. No cough or dysuria. Diarrhea is improving.    Review of Systems   Cardiovascular:  Positive for leg swelling.   All other systems reviewed and are negative.    Objective:     Vital Signs (Most Recent):  Temp: 98 °F (36.7 °C) (11/25/22 0711)  Pulse: 107 (11/25/22 0853)  Resp: 18 (11/25/22 0900)  BP: 131/88 (11/25/22 0711)  SpO2: (!) 92 % (11/25/22 0711)   Vital Signs (24h Range):  Temp:  [97.7 °F (36.5 °C)-98.9 °F (37.2 °C)] 98 °F (36.7 °C)  Pulse:  [] 107  Resp:  [16-20] 18  SpO2:  [92 %-97 %] 92 %  BP: (114-140)/(73-92) 131/88     Weight: 114.1 kg (251 lb 8.7 oz)  Body mass index is 35.08 kg/m².    Estimated Creatinine Clearance: 111.9 mL/min (based on SCr of 0.8 mg/dL).    Physical Exam  Vitals and nursing note reviewed.   Constitutional:       General: He is not in acute distress.     Appearance: Normal appearance. He is not ill-appearing, toxic-appearing or diaphoretic.   HENT:      Head: Normocephalic and atraumatic.      Right Ear: External ear normal.      Left Ear: External ear normal.      Mouth/Throat:      Comments: Healing herpes labialis  Eyes:      Extraocular Movements: Extraocular movements intact.      Conjunctiva/sclera: Conjunctivae normal.      Pupils: Pupils are equal, round, and reactive to light.   Cardiovascular:      Rate and Rhythm: Normal rate and regular rhythm.   Pulmonary:      Breath sounds: No wheezing, rhonchi or rales.   Abdominal:      Tenderness: There is no guarding or rebound.   Musculoskeletal:      Right lower leg: Edema present.   Skin:     General: Skin is warm and dry.   Neurological:      General: No focal deficit present.      Mental Status: He is alert and oriented to person, place, and time.   Psychiatric:         Mood and Affect: Mood normal.         Behavior: Behavior normal.         Thought Content: Thought content normal.         Judgment: Judgment normal.       Significant Labs: CBC:   Recent Labs   Lab 11/24/22  0615 11/25/22  0530   WBC  5.31 7.55   HGB 17.7 14.9   HCT 56.7* 45.2    321     Microbiology Results (last 7 days)       Procedure Component Value Units Date/Time    Blood culture [[id number]] Collected: 11/23/22 1232    Order Status: Completed Specimen: Blood Updated: 11/24/22 2212     Blood Culture, Routine No Growth to date      No Growth to date    Urine Culture High Risk [[id number]] Collected: 11/23/22 2314    Order Status: Sent Specimen: Urine, Clean Catch Updated: 11/24/22 0828    Culture, Anaerobic [[id number]]  (Abnormal) Collected: 11/15/22 2155    Order Status: Completed Specimen: Abscess from Perineum Updated: 11/21/22 1356     Anaerobic Culture ANAEROCOCCUS SPECIES  Many  further identified as  Anaerococcus murdochii        PREVOTELLA SPECIES  Few  further identified as Prevotella buccalis      Narrative:      Left scrotum for culture    Blood culture x two cultures. Draw prior to antibiotics. [[id number]] Collected: 11/15/22 1742    Order Status: Completed Specimen: Blood from Peripheral, Forearm, Left Updated: 11/21/22 0612     Blood Culture, Routine No growth after 5 days.    Narrative:      Aerobic and anaerobic    Blood culture x two cultures. Draw prior to antibiotics. [[id number]] Collected: 11/15/22 1741    Order Status: Completed Specimen: Blood from Antecubital, Right Updated: 11/21/22 0612     Blood Culture, Routine No growth after 5 days.    Narrative:      Aerobic and anaerobic    Aerobic culture [[id number]] Collected: 11/15/22 2139    Order Status: Completed Specimen: Genital from Abscess Updated: 11/19/22 1028     Aerobic Bacterial Culture No growth    Narrative:      Left scrotum for culture          Procalcitonin:   Recent Labs   Lab 11/24/22  0615   PROCAL 0.19       Significant Imaging: I have reviewed all pertinent imaging results/findings within the past 24 hours.

## 2022-11-25 NOTE — ASSESSMENT & PLAN NOTE
CAD  - Last echo with normal EF and grade 1 diastolic dysfunction on 10/28/2022  - No overt signs of volume overload and no reported chest pain  - Elevated troponin due to sepsis with associated hypotension along with DONNA and CHF which were trended down and no ectopy noted on telemetry, and no acute EKG changes  - Continue digoxin 125 mcg p.o. daily, ASA 81 mg p.o. daily and ezetimibe 10 mg p.o. daily  - Restart Lasix today

## 2022-11-25 NOTE — PROGRESS NOTES
[facility] ([place])  Adult Nutrition  Progress Note    SUMMARY       Recommendations  Continue on diabetic, 2000kcal diet  Encourage PO intake at meals  Consider the ADDITION of carson to promote wound healing  Monitor nutrition related labs    Goals:   Pt to consume at least 75% PO intake by RD follow up  Monitored labs to trend toward target ranges    Nutrition Goal Status: new  Communication of RD Recs:  (poc)    Assessment and Plan  Nutrition Problem  Increased protein intake    Related to (etiology):   Mary gangrene of scrotum    Signs and Symptoms (as evidenced by):   Wound debridement of necrotic tissue    Interventions/Recommendations (treatment strategy):  Promod  Collaboration with other providers    Nutrition Diagnosis Status:   New     Reason for Assessment  Reason For Assessment: length of stay  Diagnosis: infection/sepsis  Relevant Medical History:   Patient Active Problem List   Diagnosis    Hepatitis B core antibody positive    NAFLD (nonalcoholic fatty liver disease)    Essential hypertension    Systolic heart failure    Type 2 diabetes mellitus with hyperglycemia, with long-term current use of insulin    Class 1 obesity due to excess calories with serious comorbidity and body mass index (BMI) of 34.0 to 34.9 in adult    Smoker unmotivated to quit    Polycythemia, secondary    Canker sores oral    Coronary artery disease    Chronic use of opiate drug for therapeutic purpose    Rheumatoid arthritis flare    Fatigue    Sepsis    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 Felipe 20. [address] RD remote  Nutrition Discharge Planning: dc on diabetic diet, 2000kcal    Nutrition Risk Screen  Nutrition Risk Screen: large  "or nonhealing wound, burn or pressure injury    Nutrition/Diet History  Spiritual, Cultural Beliefs, Hinduism Practices, Values that Affect Care: no  Factors Affecting Nutritional Intake: decreased appetite    Anthropometrics  Temp: 98.2 °F (36.8 °C)  Height Method: Stated  Height: 5' 11" (180.3 cm)  Height (inches): 71 in  Weight Method: Bed Scale  Weight: 114.1 kg (251 lb 8.7 oz)  Weight (lb): 251.55 lb  Ideal Body Weight (IBW), Male: 172 lb  % Ideal Body Weight, Male (lb): 146.25 %  BMI (Calculated): 35.1  BMI Grade: 35 - 39.9 - obesity - grade II       Lab/Procedures/Meds  Pertinent Labs Reviewed: reviewed  CBC:  Recent Labs   Lab 11/25/22  0530   WBC 7.55   HGB 14.9   HCT 45.2        CMP:  Recent Labs   Lab 11/25/22 0530   CALCIUM 8.6*      K 4.1   CO2 30*   CL 98   BUN 5*   CREATININE 0.8     Pertinent Medications Reviewed: reviewed  Scheduled Meds:   aspirin  81 mg Oral Daily    ceFEPime (MAXIPIME) IVPB  1 g Intravenous Q8H    digoxin  125 mcg Oral Daily    ezetimibe  10 mg Oral Daily    folic acid  1 mg Oral Daily    furosemide  40 mg Oral BID    insulin detemir U-100  28 Units Subcutaneous Daily    losartan  100 mg Oral Daily    metroNIDAZOLE  500 mg Oral Q8H    multivitamin  1 tablet Oral Daily    nystatin  5 mL Oral QID (WM & HS)     Continuous Infusions:  PRN Meds:.acetaminophen, albuterol-ipratropium, benzonatate, dextrose 10%, dextrose 10%, glucagon (human recombinant), insulin aspart U-100, morphine, naloxone, ondansetron, oxyCODONE, oxyCODONE-acetaminophen, sodium chloride 0.9%      Estimated/Assessed Needs  Weight Used For Calorie Calculations: 114.1 kg (251 lb 8.7 oz)  Energy Calorie Requirements (kcal): 1928  Energy Need Method: Ban-[place] Harris (No PA)  Protein Requirements: 114 (1.0g/kg)  Weight Used For Protein Calculations: 114.1 kg (251 lb 8.7 oz)  Fluid Requirements (mL): 1mL/kcal  Estimated Fluid Requirement Method:  (or per MD)  RDA Method (mL): 1928  CHO Requirement: 250g " (2000kcal)    Nutrition Prescription Ordered  Current Diet Order: diabetic diet, 2000kcal    Evaluation of Received Nutrient/Fluid Intake  I/O: -15L since admit  Energy Calories Required: meeting needs  Protein Required: meeting needs  Fluid Required: meeting needs  Comments: LBM 11/24  Tolerance: tolerating  % Intake of Estimated Energy Needs: 25 - 50 %  % Meal Intake: 25 - 50 %  Intake/Output - Last 3 Shifts         11/23 0700 11/24 0659 11/24 0700 11/25 0659 11/25 0700 11/26 0659    P.O.  1160 480    IV Piggyback  49.1     Total Intake(mL/kg)  1209.1 (10.6) 480 (4.2)    Urine (mL/kg/hr) 1850 (0.7) 4025 (1.5) 1375 (1.2)    Emesis/NG output 0 0     Other 0      Stool 0 0     Blood 0      Total Output 1850 4025 1375    Net -1850 -2815.9 -895           Urine Occurrence 2 x      Stool Occurrence 1 x 1 x     Emesis Occurrence 0 x 0 x           Nutrition Risk  Level of Risk/Frequency of Follow-up:  (1-2x/week)     Monitor and Evaluation  Food and Nutrient Intake: energy intake, food and beverage intake  Food and Nutrient Adminstration: diet order  Knowledge/Beliefs/Attitudes: food and nutrition knowledge/skill, beliefs and attitudes  Physical Activity and Function: nutrition-related ADLs and IADLs, factors affecting access to physical activity  Anthropometric Measurements: weight, weight change, body mass index  Biochemical Data, Medical Tests and Procedures: electrolyte and renal panel, gastrointestinal profile, glucose/endocrine profile, inflammatory profile, lipid profile  Nutrition-Focused Physical Findings: overall appearance     Nutrition Follow-Up  RD Follow-up?: Yes    Radha Quintero, Registration Eligible, Provisional LDN

## 2022-11-25 NOTE — ASSESSMENT & PLAN NOTE
68 yo man with RA admitted with scrotal abscess and Mary's after suffering a leg infection (left thigh) and failing outpatient abx  - s/p debridement x 3  - culture only grew Anaerococcus and Prevotella (though patient was on abx prior to admission)  - feeling better today  - awaiting urine culture  - continue cefepime and Flagyl for now  - wound care per Urology  - will f/u again on Monday  - call for any questions before then

## 2022-11-25 NOTE — PT/OT/SLP PROGRESS
Occupational Therapy      Patient Name:  Nithin Wagner   MRN:  [id number]    Patient not seen today secondary to Nurse/ KYLE hold (Pt waiting on Cardiology to see him.  Pt's HR has elevated into the 160's twice today.). Will follow-up at next available time as schedule permits.    11/25/2022

## 2022-11-25 NOTE — ASSESSMENT & PLAN NOTE
- Hypotensive was on admit that responded to treatment with IV fluids with no need for pressor support  - Blood pressure predominantly in normal range; monitor.  - Restart BP medication, substitute with losartan 100 mg daily

## 2022-11-25 NOTE — SUBJECTIVE & OBJECTIVE
Interval History: Patient feeling better. No cough or dysuria. Diarrhea is improving.    Review of Systems   Cardiovascular:  Positive for leg swelling.   All other systems reviewed and are negative.    Objective:     Vital Signs (Most Recent):  Temp: 98 °F (36.7 °C) (11/25/22 0711)  Pulse: 107 (11/25/22 0853)  Resp: 18 (11/25/22 0900)  BP: 131/88 (11/25/22 0711)  SpO2: (!) 92 % (11/25/22 0711)   Vital Signs (24h Range):  Temp:  [97.7 °F (36.5 °C)-98.9 °F (37.2 °C)] 98 °F (36.7 °C)  Pulse:  [] 107  Resp:  [16-20] 18  SpO2:  [92 %-97 %] 92 %  BP: (114-140)/(73-92) 131/88     Weight: 114.1 kg (251 lb 8.7 oz)  Body mass index is 35.08 kg/m².    Estimated Creatinine Clearance: 111.9 mL/min (based on SCr of 0.8 mg/dL).    Physical Exam  Vitals and nursing note reviewed.   Constitutional:       General: He is not in acute distress.     Appearance: Normal appearance. He is not ill-appearing, toxic-appearing or diaphoretic.   HENT:      Head: Normocephalic and atraumatic.      Right Ear: External ear normal.      Left Ear: External ear normal.      Mouth/Throat:      Comments: Healing herpes labialis  Eyes:      Extraocular Movements: Extraocular movements intact.      Conjunctiva/sclera: Conjunctivae normal.      Pupils: Pupils are equal, round, and reactive to light.   Cardiovascular:      Rate and Rhythm: Normal rate and regular rhythm.   Pulmonary:      Breath sounds: No wheezing, rhonchi or rales.   Abdominal:      Tenderness: There is no guarding or rebound.   Musculoskeletal:      Right lower leg: Edema present.   Skin:     General: Skin is warm and dry.   Neurological:      General: No focal deficit present.      Mental Status: He is alert and oriented to person, place, and time.   Psychiatric:         Mood and Affect: Mood normal.         Behavior: Behavior normal.         Thought Content: Thought content normal.         Judgment: Judgment normal.       Significant Labs: CBC:   Recent Labs   Lab  11/24/22  0615 11/25/22  0530   WBC 5.31 7.55   HGB 17.7 14.9   HCT 56.7* 45.2    321     Microbiology Results (last 7 days)       Procedure Component Value Units Date/Time    Blood culture [[id number]] Collected: 11/23/22 1232    Order Status: Completed Specimen: Blood Updated: 11/24/22 2212     Blood Culture, Routine No Growth to date      No Growth to date    Urine Culture High Risk [[id number]] Collected: 11/23/22 2314    Order Status: Sent Specimen: Urine, Clean Catch Updated: 11/24/22 0828    Culture, Anaerobic [[id number]]  (Abnormal) Collected: 11/15/22 2155    Order Status: Completed Specimen: Abscess from Perineum Updated: 11/21/22 1356     Anaerobic Culture ANAEROCOCCUS SPECIES  Many  further identified as  Anaerococcus murdochii        PREVOTELLA SPECIES  Few  further identified as Prevotella buccalis      Narrative:      Left scrotum for culture    Blood culture x two cultures. Draw prior to antibiotics. [[id number]] Collected: 11/15/22 1742    Order Status: Completed Specimen: Blood from Peripheral, Forearm, Left Updated: 11/21/22 0612     Blood Culture, Routine No growth after 5 days.    Narrative:      Aerobic and anaerobic    Blood culture x two cultures. Draw prior to antibiotics. [[id number]] Collected: 11/15/22 1741    Order Status: Completed Specimen: Blood from Antecubital, Right Updated: 11/21/22 0612     Blood Culture, Routine No growth after 5 days.    Narrative:      Aerobic and anaerobic    Aerobic culture [[id number]] Collected: 11/15/22 2139    Order Status: Completed Specimen: Genital from Abscess Updated: 11/19/22 1028     Aerobic Bacterial Culture No growth    Narrative:      Left scrotum for culture          Procalcitonin:   Recent Labs   Lab 11/24/22  0615   PROCAL 0.19       Significant Imaging: I have reviewed all pertinent imaging results/findings within the past 24 hours.

## 2022-11-25 NOTE — PLAN OF CARE
Pt remained free of falls and injuries throughout shift. AAOx4. Pt calm and pleasant. Purposeful hourly rounding performed. Pt swallows meds whole. IV cefepime given as ordered.  Pt had several episodes of sustained ST, HR in the 150s-160s, see previous notes. IV metoprolol given, HR stable in the 80s-90s. Managing pain to scrotal wound with PRN oxycodone and IV morphine. Dsg to scrotum CDI. Patient ambulates with standby assist, urinal at bedside, strict I's and O's maintained. VSS on 2L O2 NC. Pt sleeping in bed, even rise and fall of chest. Bed low and locked, bed alarm declined at this time, pt educated to call for assist, call light in reach. Side rails up x2. Will continue to monitor.

## 2022-11-25 NOTE — PLAN OF CARE
Recommendations  Continue on diabetic, 2000kcal diet  Encourage PO intake at meals  Consider the ADDITION of carson to promote wound healing  Monitor nutrition related labs    Goals:   Pt to consume at least 75% PO intake by RD follow up  Monitored labs to trend toward target ranges    Nutrition Goal Status: new  Communication of RD Recs:  (poc)

## 2022-11-25 NOTE — SUBJECTIVE & OBJECTIVE
Interval History: No acute event, feeling better today, still with some subjective temp but no actual fever    Review of Systems   Constitutional:  Negative for chills and fever.   Respiratory:  Negative for cough and shortness of breath.    Cardiovascular:  Negative for chest pain and palpitations.   Genitourinary:  Positive for scrotal swelling.   Objective:     Vital Signs (Most Recent):  Temp: 97.7 °F (36.5 °C) (11/24/22 1605)  Pulse: 89 (11/24/22 1605)  Resp: 18 (11/24/22 1624)  BP: 139/83 (11/24/22 1605)  SpO2: 97 % (11/24/22 1605)   Vital Signs (24h Range):  Temp:  [97.7 °F (36.5 °C)-99 °F (37.2 °C)] 97.7 °F (36.5 °C)  Pulse:  [86-96] 89  Resp:  [16-20] 18  SpO2:  [94 %-98 %] 97 %  BP: (131-147)/(73-94) 139/83     Weight: 114.1 kg (251 lb 8.7 oz)  Body mass index is 35.08 kg/m².    Intake/Output Summary (Last 24 hours) at 11/24/2022 1809  Last data filed at 11/24/2022 1656  Gross per 24 hour   Intake 800 ml   Output 4000 ml   Net -3200 ml        Physical Exam  Constitutional:       General: He is sleeping.      Appearance: He is well-developed. He is obese.   HENT:      Head: Normocephalic and atraumatic.      Nose: Nose normal.   Eyes:      Extraocular Movements: Extraocular movements intact.      Conjunctiva/sclera: Conjunctivae normal.   Cardiovascular:      Rate and Rhythm: Normal rate and regular rhythm.      Pulses:           Radial pulses are 2+ on the right side and 2+ on the left side.      Heart sounds: Normal heart sounds.   Pulmonary:      Effort: Pulmonary effort is normal.      Breath sounds: Normal breath sounds.   Abdominal:      General: Bowel sounds are normal. There is no distension.      Palpations: Abdomen is soft.      Tenderness: There is no abdominal tenderness. There is no guarding or rebound.   Musculoskeletal:         General: Normal range of motion.      Cervical back: Normal range of motion and neck supple.   Skin:     General: Skin is warm and dry.      Comments: Packing noted to  left scrotum and an area of dark spot on right scrotum more like scab   Neurological:      Mental Status: He is oriented to person, place, and time.      Motor: Motor function is intact.   Psychiatric:         Mood and Affect: Mood normal.         Behavior: Behavior normal.         Thought Content: Thought content normal.       Significant Labs: All pertinent labs within the past 24 hours have been reviewed.    Significant Imaging: I have reviewed all pertinent imaging results/findings within the past 24 hours.

## 2022-11-25 NOTE — ASSESSMENT & PLAN NOTE
- Creatinine 2.5, baseline- 1.3; likely due to hypotension/sepsis  - Improved with IV fluids and treatment of sepsis  - Creatinine down to normal levels  - Stop IV fluids  - Continue monitor with repeat labs and follow I/O's

## 2022-11-25 NOTE — SUBJECTIVE & OBJECTIVE
Interval History: Patient feels well and without complaints this AM. Voiding without issue. Afebrile, WBC normal.    Objective:     Temp:  [97.7 °F (36.5 °C)-98.9 °F (37.2 °C)] 98 °F (36.7 °C)  Pulse:  [] 99  Resp:  [16-20] 18  SpO2:  [92 %-97 %] 92 %  BP: (114-140)/(73-92) 131/88     Body mass index is 35.08 kg/m².           Drains       Drain  Duration                  Urethral Catheter 11/15/22 2145 Non-latex 16 Fr. 5 days                    Physical Exam  Vitals and nursing note reviewed.   Constitutional:       General: He is not in acute distress.     Appearance: Normal appearance.   HENT:      Head: Atraumatic.      Nose: Nose normal.   Eyes:      Extraocular Movements: Extraocular movements intact.      Pupils: Pupils are equal, round, and reactive to light.   Cardiovascular:      Rate and Rhythm: Normal rate.   Pulmonary:      Effort: Pulmonary effort is normal.   Abdominal:      General: Abdomen is flat. There is no distension.      Tenderness: There is no abdominal tenderness. There is no right CVA tenderness or left CVA tenderness.   Genitourinary:     Comments: Open wound with kerlex and ABD pads. Dressing clean and intact.  Wound edges appear healthy and viable.  Musculoskeletal:         General: Normal range of motion.      Cervical back: Normal range of motion.   Neurological:      General: No focal deficit present.      Mental Status: He is alert and oriented to person, place, and time.   Psychiatric:         Mood and Affect: Mood normal.         Behavior: Behavior normal.       Significant Labs:    BMP:  Recent Labs   Lab 11/20/22  1238 11/24/22  0615 11/25/22  0530    138 137   K 4.4 4.4 4.1    103 98   CO2 28 29 30*   BUN 7* 4* 5*   CREATININE 0.7 0.8 0.8   CALCIUM 8.1* 8.5* 8.6*         CBC:   Recent Labs   Lab 11/20/22  1238 11/24/22  0615 11/25/22  0530   WBC 9.71 5.31 7.55   HGB 14.3 17.7 14.9   HCT 44.3 56.7* 45.2    224 321         Urine Studies:   Recent Labs   Lab  11/23/22  2314   COLORU Yellow   APPEARANCEUA Clear   PHUR 7.0   SPECGRAV 1.010   PROTEINUA Negative   GLUCUA Trace*   KETONESU 1+*   BILIRUBINUA Negative   OCCULTUA Negative   NITRITE Negative   UROBILINOGEN Negative   LEUKOCYTESUR Negative       Significant Imaging:  All pertinent imaging results/findings from the past 24 hours have been reviewed.

## 2022-11-25 NOTE — PROGRESS NOTES
[facility] ([place])  Urology  Progress Note    Patient Name: Nithin Wagner  MRN: [id number]  Admission Date: 11/15/2022  Hospital Length of Stay: 10 days  Code Status: Full Code   Attending Provider: Darby Obrien MD   Primary Care Physician: Tushar Drew MD    Subjective:     HPI:  67M h/o DM2, CAD, HTN, HLD presents with scrotal pain and swelling. Started about 4 weeks ago and has been progressively worsening despite a couple courses of antibiotics (unsure which ones). He reports fevers and chills at home. He also reports nausea, decreased PO intake, multiple syncopal episodes, and stool incontinence. He is on continuous blood glucose monitoring and states blood sugars have been in 190s today. He was seen yesterday in urgent care and diagnosed with scrotal abscess with possible kami's; he referred to the ER but waited until today to come in.    He was hypotensive and tachycardic on arrival in the ED. Sepsis protocol was started. Labs and imaging are currently pending.         Interval History: Patient feels well and without complaints this AM. Voiding without issue. Afebrile, WBC normal.    Objective:     Temp:  [97.7 °F (36.5 °C)-98.9 °F (37.2 °C)] 98 °F (36.7 °C)  Pulse:  [] 99  Resp:  [16-20] 18  SpO2:  [92 %-97 %] 92 %  BP: (114-140)/(73-92) 131/88     Body mass index is 35.08 kg/m².           Drains       Drain  Duration                  Urethral Catheter 11/15/22 2145 Non-latex 16 Fr. 5 days                    Physical Exam  Vitals and nursing note reviewed.   Constitutional:       General: He is not in acute distress.     Appearance: Normal appearance.   HENT:      Head: Atraumatic.      Nose: Nose normal.   Eyes:      Extraocular Movements: Extraocular movements intact.      Pupils: Pupils are equal, round, and reactive to light.   Cardiovascular:      Rate and Rhythm: Normal rate.   Pulmonary:      Effort: Pulmonary effort is normal.   Abdominal:      General: Abdomen is flat. There is  no distension.      Tenderness: There is no abdominal tenderness. There is no right CVA tenderness or left CVA tenderness.   Genitourinary:     Comments: Open wound with kerlex and ABD pads. Dressing clean and intact.  Wound edges appear healthy and viable.  Musculoskeletal:         General: Normal range of motion.      Cervical back: Normal range of motion.   Neurological:      General: No focal deficit present.      Mental Status: He is alert and oriented to person, place, and time.   Psychiatric:         Mood and Affect: Mood normal.         Behavior: Behavior normal.       Significant Labs:    BMP:  Recent Labs   Lab 11/20/22  1238 11/24/22  0615 11/25/22  0530    138 137   K 4.4 4.4 4.1    103 98   CO2 28 29 30*   BUN 7* 4* 5*   CREATININE 0.7 0.8 0.8   CALCIUM 8.1* 8.5* 8.6*         CBC:   Recent Labs   Lab 11/20/22  1238 11/24/22  0615 11/25/22  0530   WBC 9.71 5.31 7.55   HGB 14.3 17.7 14.9   HCT 44.3 56.7* 45.2    224 321         Urine Studies:   Recent Labs   Lab 11/23/22  2314   COLORU Yellow   APPEARANCEUA Clear   PHUR 7.0   SPECGRAV 1.010   PROTEINUA Negative   GLUCUA Trace*   KETONESU 1+*   BILIRUBINUA Negative   OCCULTUA Negative   NITRITE Negative   UROBILINOGEN Negative   LEUKOCYTESUR Negative       Significant Imaging:  All pertinent imaging results/findings from the past 24 hours have been reviewed.        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    - Admitted to [facility] medicine, appreciate assistance  - Debrided 11/15 and 11/17 in OR. Medial aspect of wound debrided at bedside 11/22.  - Ricketts removed 11/21. Patient voiding.  - Diet, insulin and pain control per primary    - Continue antibiotics per infectious disease recs. On cefepime/flagyl.  - Anaerobic culture with Anaerococcus murdochii and Prevotella bucchalis.  - Dressing changes BID. Wound care following.  - Rest of care per primary        VTE Risk  Mitigation (From admission, onward)         Ordered     IP VTE HIGH RISK PATIENT  Once         11/15/22 1948     Reason for No Pharmacological VTE Prophylaxis  Once        Question:  Reasons:  Answer:  Risk of Bleeding    11/15/22 1948     Place sequential compression device  Until discontinued         11/15/22 1936                Jarred Zamora MD  Urology  [facility] ([place])

## 2022-11-25 NOTE — ASSESSMENT & PLAN NOTE
Mary's Gangrene of Scrotum  Leukocytosis  Lactic acidosis  Elevated troponin  - Patient criteria for sepsis with infective focus of Mary gangrene of the scrotum  - Lactic acid level 4.4, procalcitonin >48, WBC 24, and elevated troponin levels that trended down  - Urology consulted and patient underwent excision and debridement of necrotic scrotal tissue on 11/15; repeat debridement 11/17  - Patient was on cefepime 1g IV q.12, clindamycin 900 mg IV Q 8, metronidazole 500 mg IV Q 8 and vancomycin dosed by pharmacy  - Elevated troponins likely secondary to sepsis and setting of DONNA given no reported chest pain and no acute EKG changes  - Blood cultures no growth to date and wound cultures with Anaerococcus and Prevotella species  - Wound care ordered and Urology following  - Clinically was improving with resolution of lactic acidosis and definitive debridement  - Adjust pain medication regimen with addition of medication for breakthrough pain  - ID consult appreciated, and clindamycin and vancomycin stopped on 11/21  - Ricketts removed on 11/21  - Urology performed beside debridement on 11/22  - Clinically worse on 11/23 and repeat blood cultures NGTD, UA negative and CXR with edema  - Stop IV fluids and restart lasix  - Follow up studies and await sensitivities

## 2022-11-25 NOTE — PROGRESS NOTES
[facility] Medicine  Progress Note    Patient Name: Nithin Wagner  MRN: [id number]  Patient Class: IP- Inpatient   Admission Date: 11/15/2022  Length of Stay: 9 days  Attending Physician: Darby Obrien MD  Primary Care Provider: Tushar Drew MD        Subjective:     Principal Problem:Sepsis        HPI:  The patient is a 67M with a past medical history of DM2, CAD, HTN, and HLD who presents with scrotal pain and swelling. Started about 4 weeks ago and has been progressively worsening despite a couple courses of antibiotics (unsure which ones). He reports fevers and chills at home, nausea, decreased PO intake, and multiple syncopal episodes with +incontinence of stool. Unable to urinate for the past 24 hours or so. Sugars 190s at home. He was seen yesterday in urgent care and referred to the ER, but says he wasn't able to make it at that time so he comes today instead. On initial workup, the patient has CT evidence of Fourniers gangrene.  He will be brought to the operating room emergently and admitted after to ICU for further management.      Overview/Hospital Course:  Mr. Wagner presented with scrotal pain and swelling.  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  Empiric initiated with cefepime, clindamycin, metronidazole, and vancomycin.  Urology consulted and he underwent emergent excision and debridement of necrotic tissue on 11/15. Repeat washout performed 11/17. Improved and stepped down from ICU 11/18.      Interval History: No acute event, feeling better today, still with some subjective temp but no actual fever    Review of Systems   Constitutional:  Negative for chills and fever.   Respiratory:  Negative for cough and shortness of breath.    Cardiovascular:  Negative for chest pain and palpitations.   Genitourinary:  Positive for scrotal swelling.   Objective:     Vital Signs (Most Recent):  Temp: 97.7 °F (36.5 °C) (11/24/22 1605)  Pulse: 89 (11/24/22 1605)  Resp: 18  (11/24/22 1624)  BP: 139/83 (11/24/22 1605)  SpO2: 97 % (11/24/22 1605)   Vital Signs (24h Range):  Temp:  [97.7 °F (36.5 °C)-99 °F (37.2 °C)] 97.7 °F (36.5 °C)  Pulse:  [86-96] 89  Resp:  [16-20] 18  SpO2:  [94 %-98 %] 97 %  BP: (131-147)/(73-94) 139/83     Weight: 114.1 kg (251 lb 8.7 oz)  Body mass index is 35.08 kg/m².    Intake/Output Summary (Last 24 hours) at 11/24/2022 1809  Last data filed at 11/24/2022 1656  Gross per 24 hour   Intake 800 ml   Output 4000 ml   Net -3200 ml        Physical Exam  Constitutional:       General: He is sleeping.      Appearance: He is well-developed. He is obese.   HENT:      Head: Normocephalic and atraumatic.      Nose: Nose normal.   Eyes:      Extraocular Movements: Extraocular movements intact.      Conjunctiva/sclera: Conjunctivae normal.   Cardiovascular:      Rate and Rhythm: Normal rate and regular rhythm.      Pulses:           Radial pulses are 2+ on the right side and 2+ on the left side.      Heart sounds: Normal heart sounds.   Pulmonary:      Effort: Pulmonary effort is normal.      Breath sounds: Normal breath sounds.   Abdominal:      General: Bowel sounds are normal. There is no distension.      Palpations: Abdomen is soft.      Tenderness: There is no abdominal tenderness. There is no guarding or rebound.   Musculoskeletal:         General: Normal range of motion.      Cervical back: Normal range of motion and neck supple.   Skin:     General: Skin is warm and dry.      Comments: Packing noted to left scrotum and an area of dark spot on right scrotum more like scab   Neurological:      Mental Status: He is oriented to person, place, and time.      Motor: Motor function is intact.   Psychiatric:         Mood and Affect: Mood normal.         Behavior: Behavior normal.         Thought Content: Thought content normal.       Significant Labs: All pertinent labs within the past 24 hours have been reviewed.    Significant Imaging: I have reviewed all pertinent  imaging results/findings within the past 24 hours.      Assessment/Plan:      * Sepsis  Mary's Gangrene of Scrotum  Leukocytosis  Lactic acidosis  Elevated troponin  - Patient criteria for sepsis with infective focus of Mary gangrene of the scrotum  - Lactic acid level 4.4, procalcitonin >48, WBC 24, and elevated troponin levels that trended down  - Urology consulted and patient underwent excision and debridement of necrotic scrotal tissue on 11/15; repeat debridement 11/17  - Patient was on cefepime 1g IV q.12, clindamycin 900 mg IV Q 8, metronidazole 500 mg IV Q 8 and vancomycin dosed by pharmacy  - Elevated troponins likely secondary to sepsis and setting of DONNA given no reported chest pain and no acute EKG changes  - Blood cultures no growth to date and wound cultures with Anaerococcus and Prevotella species  - Wound care ordered and Urology following  - Clinically was improving with resolution of lactic acidosis and definitive debridement  - Adjust pain medication regimen with addition of medication for breakthrough pain  - ID consult appreciated, and clindamycin and vancomycin stopped on 11/21  - Ricketts removed on 11/21  - Urology performed beside debridement on 11/22  - Clinically worse on 11/23 and repeat blood cultures NGTD, UA negative and CXR with edema  - Stop IV fluids and restart lasix  - Follow up studies and await sensitivities    DONNA (acute kidney injury)  - Creatinine 2.5, baseline- 1.3; likely due to hypotension/sepsis  - Improved with IV fluids and treatment of sepsis  - Creatinine down to normal levels  - Stop IV fluids  - Continue monitor with repeat labs and follow I/O's    Chronic diastolic heart failure  CAD  - Last echo with normal EF and grade 1 diastolic dysfunction on 10/28/2022  - No overt signs of volume overload and no reported chest pain  - Elevated troponin due to sepsis with associated hypotension along with DONNA and CHF which were trended down and no ectopy noted on  "telemetry, and no acute EKG changes  - Continue digoxin 125 mcg p.o. daily, ASA 81 mg p.o. daily and ezetimibe 10 mg p.o. 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  - Glucoses levels reviewed, are overall at targets   - Increase detemir to 28U subQ daily, and moderate-dose sliding scale insulin aspart 1-10U subQ TIDWM PRN.  - Will adjust insulin regimen as needed to achieve glucose between 140 - 180    Essential hypertension  - Hypotensive was on admit that responded to treatment with IV fluids with no need for pressor support  - Blood pressure predominantly in normal range; monitor.  - Restart BP medication, substitute with losartan 100 mg daily      VTE Risk Mitigation (From admission, onward)         Ordered     IP VTE HIGH RISK PATIENT  Once         11/15/22 1948     Reason for No Pharmacological VTE Prophylaxis  Once        Question:  Reasons:  Answer:  Risk of Bleeding    11/15/22 1948     Place sequential compression device  Until discontinued         11/15/22 1936                      Darby Obrien MD  Department of Hospital Medicine   [facility] ([place])  "

## 2022-11-26 LAB
ANION GAP SERPL CALC-SCNC: 7 MMOL/L (ref 8–16)
BASOPHILS # BLD AUTO: 0.08 K/UL (ref 0–0.2)
BASOPHILS NFR BLD: 0.9 % (ref 0–1.9)
BUN SERPL-MCNC: 6 MG/DL (ref 8–23)
CALCIUM SERPL-MCNC: 8.8 MG/DL (ref 8.7–10.5)
CHLORIDE SERPL-SCNC: 94 MMOL/L (ref 95–110)
CO2 SERPL-SCNC: 35 MMOL/L (ref 23–29)
CREAT SERPL-MCNC: 0.8 MG/DL (ref 0.5–1.4)
DIFFERENTIAL METHOD: ABNORMAL
EOSINOPHIL # BLD AUTO: 0.1 K/UL (ref 0–0.5)
EOSINOPHIL NFR BLD: 1 % (ref 0–8)
ERYTHROCYTE [DISTWIDTH] IN BLOOD BY AUTOMATED COUNT: 12.7 % (ref 11.5–14.5)
EST. GFR  (NO RACE VARIABLE): >60 ML/MIN/1.73 M^2
GLUCOSE SERPL-MCNC: 189 MG/DL (ref 70–110)
GLUCOSE SERPL-MCNC: 219 MG/DL (ref 70–110)
GLUCOSE SERPL-MCNC: 238 MG/DL (ref 70–110)
GLUCOSE SERPL-MCNC: 271 MG/DL (ref 70–110)
HCT VFR BLD AUTO: 44.2 % (ref 40–54)
HGB BLD-MCNC: 14.6 G/DL (ref 14–18)
IMM GRANULOCYTES # BLD AUTO: 0.16 K/UL (ref 0–0.04)
IMM GRANULOCYTES NFR BLD AUTO: 1.9 % (ref 0–0.5)
LYMPHOCYTES # BLD AUTO: 1.4 K/UL (ref 1–4.8)
LYMPHOCYTES NFR BLD: 16.2 % (ref 18–48)
MAGNESIUM SERPL-MCNC: 1.8 MG/DL (ref 1.6–2.6)
MCH RBC QN AUTO: 33.6 PG (ref 27–31)
MCHC RBC AUTO-ENTMCNC: 33 G/DL (ref 32–36)
MCV RBC AUTO: 102 FL (ref 82–98)
MONOCYTES # BLD AUTO: 0.9 K/UL (ref 0.3–1)
MONOCYTES NFR BLD: 10 % (ref 4–15)
NEUTROPHILS # BLD AUTO: 6 K/UL (ref 1.8–7.7)
NEUTROPHILS NFR BLD: 70 % (ref 38–73)
NRBC BLD-RTO: 0 /100 WBC
PHOSPHATE SERPL-MCNC: 3.3 MG/DL (ref 2.7–4.5)
PLATELET # BLD AUTO: 327 K/UL (ref 150–450)
PMV BLD AUTO: 11.3 FL (ref 9.2–12.9)
POCT GLUCOSE: 177 MG/DL (ref 70–110)
POCT GLUCOSE: 206 MG/DL (ref 70–110)
POCT GLUCOSE: 215 MG/DL (ref 70–110)
POCT GLUCOSE: 219 MG/DL (ref 70–110)
POCT GLUCOSE: 250 MG/DL (ref 70–110)
POCT GLUCOSE: 271 MG/DL (ref 70–110)
POCT GLUCOSE: 320 MG/DL (ref 70–110)
POTASSIUM SERPL-SCNC: 4 MMOL/L (ref 3.5–5.1)
RBC # BLD AUTO: 4.35 M/UL (ref 4.6–6.2)
SODIUM SERPL-SCNC: 136 MMOL/L (ref 136–145)
WBC # BLD AUTO: 8.58 K/UL (ref 3.9–12.7)

## 2022-11-26 PROCEDURE — 99024 POSTOP FOLLOW-UP VISIT: CPT | Mod: ,,, | Performed by: STUDENT IN AN ORGANIZED HEALTH CARE EDUCATION/TRAINING PROGRAM

## 2022-11-26 PROCEDURE — 84100 ASSAY OF PHOSPHORUS: CPT | Performed by: HOSPITALIST

## 2022-11-26 PROCEDURE — 25000003 PHARM REV CODE 250: Performed by: UROLOGY

## 2022-11-26 PROCEDURE — 85025 COMPLETE CBC W/AUTO DIFF WBC: CPT | Performed by: HOSPITALIST

## 2022-11-26 PROCEDURE — 99233 SBSQ HOSP IP/OBS HIGH 50: CPT | Mod: ,,, | Performed by: INTERNAL MEDICINE

## 2022-11-26 PROCEDURE — 80048 BASIC METABOLIC PNL TOTAL CA: CPT | Performed by: HOSPITALIST

## 2022-11-26 PROCEDURE — 83735 ASSAY OF MAGNESIUM: CPT | Performed by: HOSPITALIST

## 2022-11-26 PROCEDURE — 25000003 PHARM REV CODE 250: Performed by: HOSPITALIST

## 2022-11-26 PROCEDURE — 93005 ELECTROCARDIOGRAM TRACING: CPT

## 2022-11-26 PROCEDURE — 63600175 PHARM REV CODE 636 W HCPCS: Performed by: NURSE PRACTITIONER

## 2022-11-26 PROCEDURE — 63600175 PHARM REV CODE 636 W HCPCS: Performed by: INTERNAL MEDICINE

## 2022-11-26 PROCEDURE — 25000003 PHARM REV CODE 250: Performed by: INTERNAL MEDICINE

## 2022-11-26 PROCEDURE — 63600175 PHARM REV CODE 636 W HCPCS: Performed by: HOSPITALIST

## 2022-11-26 PROCEDURE — 99233 PR SUBSEQUENT HOSPITAL CARE,LEVL III: ICD-10-PCS | Mod: ,,, | Performed by: INTERNAL MEDICINE

## 2022-11-26 PROCEDURE — 93010 ELECTROCARDIOGRAM REPORT: CPT | Mod: ,,, | Performed by: INTERNAL MEDICINE

## 2022-11-26 PROCEDURE — 99024 PR POST-OP FOLLOW-UP VISIT: ICD-10-PCS | Mod: ,,, | Performed by: STUDENT IN AN ORGANIZED HEALTH CARE EDUCATION/TRAINING PROGRAM

## 2022-11-26 PROCEDURE — 94761 N-INVAS EAR/PLS OXIMETRY MLT: CPT

## 2022-11-26 PROCEDURE — 21400001 HC TELEMETRY ROOM

## 2022-11-26 PROCEDURE — 36415 COLL VENOUS BLD VENIPUNCTURE: CPT | Performed by: HOSPITALIST

## 2022-11-26 PROCEDURE — 93010 EKG 12-LEAD: ICD-10-PCS | Mod: ,,, | Performed by: INTERNAL MEDICINE

## 2022-11-26 RX ORDER — INSULIN ASPART 100 [IU]/ML
8 INJECTION, SOLUTION INTRAVENOUS; SUBCUTANEOUS
Status: DISCONTINUED | OUTPATIENT
Start: 2022-11-27 | End: 2022-12-01 | Stop reason: HOSPADM

## 2022-11-26 RX ORDER — METOPROLOL TARTRATE 50 MG/1
50 TABLET ORAL EVERY 12 HOURS
Status: DISCONTINUED | OUTPATIENT
Start: 2022-11-26 | End: 2022-12-01 | Stop reason: HOSPADM

## 2022-11-26 RX ADMIN — MORPHINE SULFATE 6 MG: 2 INJECTION, SOLUTION INTRAMUSCULAR; INTRAVENOUS at 04:11

## 2022-11-26 RX ADMIN — THERA TABS 1 TABLET: TAB at 09:11

## 2022-11-26 RX ADMIN — METOPROLOL TARTRATE 25 MG: 25 TABLET, FILM COATED ORAL at 01:11

## 2022-11-26 RX ADMIN — CEFEPIME HYDROCHLORIDE 1 G: 1 INJECTION, SOLUTION INTRAVENOUS at 05:11

## 2022-11-26 RX ADMIN — LOSARTAN POTASSIUM 100 MG: 50 TABLET, FILM COATED ORAL at 09:11

## 2022-11-26 RX ADMIN — MORPHINE SULFATE 6 MG: 2 INJECTION, SOLUTION INTRAMUSCULAR; INTRAVENOUS at 12:11

## 2022-11-26 RX ADMIN — INSULIN ASPART 4 UNITS: 100 INJECTION, SOLUTION INTRAVENOUS; SUBCUTANEOUS at 09:11

## 2022-11-26 RX ADMIN — INSULIN ASPART 5 UNITS: 100 INJECTION, SOLUTION INTRAVENOUS; SUBCUTANEOUS at 01:11

## 2022-11-26 RX ADMIN — FOLIC ACID 1 MG: 1 TABLET ORAL at 09:11

## 2022-11-26 RX ADMIN — OXYCODONE 15 MG: 5 TABLET ORAL at 02:11

## 2022-11-26 RX ADMIN — MORPHINE SULFATE 6 MG: 2 INJECTION, SOLUTION INTRAMUSCULAR; INTRAVENOUS at 09:11

## 2022-11-26 RX ADMIN — INSULIN ASPART 2 UNITS: 100 INJECTION, SOLUTION INTRAVENOUS; SUBCUTANEOUS at 05:11

## 2022-11-26 RX ADMIN — NYSTATIN 500000 UNITS: 500000 SUSPENSION ORAL at 09:11

## 2022-11-26 RX ADMIN — FUROSEMIDE 40 MG: 40 TABLET ORAL at 05:11

## 2022-11-26 RX ADMIN — MORPHINE SULFATE 6 MG: 2 INJECTION, SOLUTION INTRAMUSCULAR; INTRAVENOUS at 06:11

## 2022-11-26 RX ADMIN — INSULIN ASPART 6 UNITS: 100 INJECTION, SOLUTION INTRAVENOUS; SUBCUTANEOUS at 01:11

## 2022-11-26 RX ADMIN — MORPHINE SULFATE 6 MG: 2 INJECTION, SOLUTION INTRAMUSCULAR; INTRAVENOUS at 02:11

## 2022-11-26 RX ADMIN — EZETIMIBE 10 MG: 10 TABLET ORAL at 09:11

## 2022-11-26 RX ADMIN — CEFEPIME HYDROCHLORIDE 1 G: 1 INJECTION, SOLUTION INTRAVENOUS at 12:11

## 2022-11-26 RX ADMIN — NYSTATIN 500000 UNITS: 500000 SUSPENSION ORAL at 01:11

## 2022-11-26 RX ADMIN — INSULIN ASPART 5 UNITS: 100 INJECTION, SOLUTION INTRAVENOUS; SUBCUTANEOUS at 09:11

## 2022-11-26 RX ADMIN — ASPIRIN 81 MG CHEWABLE TABLET 81 MG: 81 TABLET CHEWABLE at 09:11

## 2022-11-26 RX ADMIN — ONDANSETRON 4 MG: 2 INJECTION INTRAMUSCULAR; INTRAVENOUS at 12:11

## 2022-11-26 RX ADMIN — FUROSEMIDE 40 MG: 40 TABLET ORAL at 09:11

## 2022-11-26 RX ADMIN — METOPROLOL TARTRATE 50 MG: 50 TABLET, FILM COATED ORAL at 05:11

## 2022-11-26 RX ADMIN — ONDANSETRON 4 MG: 2 INJECTION INTRAMUSCULAR; INTRAVENOUS at 10:11

## 2022-11-26 RX ADMIN — INSULIN ASPART 5 UNITS: 100 INJECTION, SOLUTION INTRAVENOUS; SUBCUTANEOUS at 05:11

## 2022-11-26 RX ADMIN — METOPROLOL TARTRATE 25 MG: 25 TABLET, FILM COATED ORAL at 12:11

## 2022-11-26 RX ADMIN — METRONIDAZOLE 500 MG: 500 TABLET ORAL at 01:11

## 2022-11-26 RX ADMIN — MORPHINE SULFATE 6 MG: 2 INJECTION, SOLUTION INTRAMUSCULAR; INTRAVENOUS at 10:11

## 2022-11-26 RX ADMIN — DIGOXIN 125 MCG: 125 TABLET ORAL at 09:11

## 2022-11-26 RX ADMIN — NYSTATIN 500000 UNITS: 500000 SUSPENSION ORAL at 05:11

## 2022-11-26 RX ADMIN — METOPROLOL TARTRATE 25 MG: 25 TABLET, FILM COATED ORAL at 06:11

## 2022-11-26 RX ADMIN — CEFEPIME HYDROCHLORIDE 1 G: 1 INJECTION, SOLUTION INTRAVENOUS at 09:11

## 2022-11-26 RX ADMIN — METRONIDAZOLE 500 MG: 500 TABLET ORAL at 09:11

## 2022-11-26 RX ADMIN — METRONIDAZOLE 500 MG: 500 TABLET ORAL at 06:11

## 2022-11-26 RX ADMIN — OXYCODONE HYDROCHLORIDE AND ACETAMINOPHEN 1 TABLET: 5; 325 TABLET ORAL at 06:11

## 2022-11-26 NOTE — ASSESSMENT & PLAN NOTE
- HgbA1c of 7.6  - Continue cardiac/diabetic diet.  - Glucoses levels reviewed, not at targets   - Increase detemir to 32U subQ daily, and moderate-dose sliding scale insulin aspart 1-10U subQ TIDWM PRN, add Novolog 5U QAC  - Will adjust insulin regimen as needed to achieve glucose between 140 - 180

## 2022-11-26 NOTE — SUBJECTIVE & OBJECTIVE
Interval History: Noted intermittent long runs of SVT noted on telemetry, up to 150s. No reported chest pain or SOB, feeling about the same.    Review of Systems   Constitutional:  Negative for chills and fever.   Respiratory:  Negative for cough and shortness of breath.    Cardiovascular:  Negative for chest pain.   Genitourinary:  Positive for scrotal swelling.   Objective:     Vital Signs (Most Recent):  Temp: 98 °F (36.7 °C) (11/25/22 2012)  Pulse: (!) 153 (11/25/22 2012)  Resp: 19 (11/25/22 2159)  BP: 108/74 (11/25/22 2012)  SpO2: 96 % (11/25/22 2012)   Vital Signs (24h Range):  Temp:  [97.7 °F (36.5 °C)-98.9 °F (37.2 °C)] 98 °F (36.7 °C)  Pulse:  [] 153  Resp:  [16-20] 19  SpO2:  [92 %-96 %] 96 %  BP: (108-136)/(70-92) 108/74     Weight: 114.1 kg (251 lb 8.7 oz)  Body mass index is 35.08 kg/m².    Intake/Output Summary (Last 24 hours) at 11/25/2022 2216  Last data filed at 11/25/2022 2030  Gross per 24 hour   Intake 709.14 ml   Output 4625 ml   Net -3915.86 ml        Physical Exam  Constitutional:       General: He is sleeping.      Appearance: He is well-developed. He is obese.   HENT:      Head: Normocephalic and atraumatic.      Nose: Nose normal.   Eyes:      Extraocular Movements: Extraocular movements intact.      Conjunctiva/sclera: Conjunctivae normal.   Cardiovascular:      Rate and Rhythm: Normal rate and regular rhythm.      Pulses:           Radial pulses are 2+ on the right side and 2+ on the left side.      Heart sounds: Normal heart sounds.   Pulmonary:      Effort: Pulmonary effort is normal.      Breath sounds: Normal breath sounds.   Abdominal:      General: Bowel sounds are normal. There is no distension.      Palpations: Abdomen is soft.      Tenderness: There is no abdominal tenderness. There is no guarding or rebound.   Musculoskeletal:         General: Normal range of motion.      Cervical back: Normal range of motion and neck supple.   Skin:     General: Skin is warm and dry.       Comments: Packing noted to left scrotum and an area of dark spot on right scrotum more like scab   Neurological:      Mental Status: He is oriented to person, place, and time.      Motor: Motor function is intact.   Psychiatric:         Mood and Affect: Mood normal.         Behavior: Behavior normal.         Thought Content: Thought content normal.       Significant Labs: All pertinent labs within the past 24 hours have been reviewed.    Significant Imaging: I have reviewed all pertinent imaging results/findings within the past 24 hours.

## 2022-11-26 NOTE — PROGRESS NOTES
[facility] Medicine  Progress Note    Patient Name: Nithin Wagner  MRN: [id number]  Patient Class: IP- Inpatient   Admission Date: 11/15/2022  Length of Stay: 10 days  Attending Physician: Darby Obrien MD  Primary Care Provider: Tushar Drew MD        Subjective:     Principal Problem:Sepsis        HPI:  The patient is a 67M with a past medical history of DM2, CAD, HTN, and HLD who presents with scrotal pain and swelling. Started about 4 weeks ago and has been progressively worsening despite a couple courses of antibiotics (unsure which ones). He reports fevers and chills at home, nausea, decreased PO intake, and multiple syncopal episodes with +incontinence of stool. Unable to urinate for the past 24 hours or so. Sugars 190s at home. He was seen yesterday in urgent care and referred to the ER, but says he wasn't able to make it at that time so he comes today instead. On initial workup, the patient has CT evidence of Fourniers gangrene.  He will be brought to the operating room emergently and admitted after to ICU for further management.      Overview/Hospital Course:  Mr. Wagner presented with scrotal pain and swelling.  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  Empiric initiated with cefepime, clindamycin, metronidazole, and vancomycin.  Urology consulted and he underwent emergent excision and debridement of necrotic tissue on 11/15. Repeat washout performed 11/17. Improved and stepped down from ICU 11/18.      Interval History: Noted intermittent long runs of SVT noted on telemetry, up to 150s. No reported chest pain or SOB, feeling about the same.    Review of Systems   Constitutional:  Negative for chills and fever.   Respiratory:  Negative for cough and shortness of breath.    Cardiovascular:  Negative for chest pain.   Genitourinary:  Positive for scrotal swelling.   Objective:     Vital Signs (Most Recent):  Temp: 98 °F (36.7 °C) (11/25/22 2012)  Pulse: (!) 153  (11/25/22 2012)  Resp: 19 (11/25/22 2159)  BP: 108/74 (11/25/22 2012)  SpO2: 96 % (11/25/22 2012)   Vital Signs (24h Range):  Temp:  [97.7 °F (36.5 °C)-98.9 °F (37.2 °C)] 98 °F (36.7 °C)  Pulse:  [] 153  Resp:  [16-20] 19  SpO2:  [92 %-96 %] 96 %  BP: (108-136)/(70-92) 108/74     Weight: 114.1 kg (251 lb 8.7 oz)  Body mass index is 35.08 kg/m².    Intake/Output Summary (Last 24 hours) at 11/25/2022 2216  Last data filed at 11/25/2022 2030  Gross per 24 hour   Intake 709.14 ml   Output 4625 ml   Net -3915.86 ml        Physical Exam  Constitutional:       General: He is sleeping.      Appearance: He is well-developed. He is obese.   HENT:      Head: Normocephalic and atraumatic.      Nose: Nose normal.   Eyes:      Extraocular Movements: Extraocular movements intact.      Conjunctiva/sclera: Conjunctivae normal.   Cardiovascular:      Rate and Rhythm: Normal rate and regular rhythm.      Pulses:           Radial pulses are 2+ on the right side and 2+ on the left side.      Heart sounds: Normal heart sounds.   Pulmonary:      Effort: Pulmonary effort is normal.      Breath sounds: Normal breath sounds.   Abdominal:      General: Bowel sounds are normal. There is no distension.      Palpations: Abdomen is soft.      Tenderness: There is no abdominal tenderness. There is no guarding or rebound.   Musculoskeletal:         General: Normal range of motion.      Cervical back: Normal range of motion and neck supple.   Skin:     General: Skin is warm and dry.      Comments: Packing noted to left scrotum and an area of dark spot on right scrotum more like scab   Neurological:      Mental Status: He is oriented to person, place, and time.      Motor: Motor function is intact.   Psychiatric:         Mood and Affect: Mood normal.         Behavior: Behavior normal.         Thought Content: Thought content normal.       Significant Labs: All pertinent labs within the past 24 hours have been reviewed.    Significant Imaging: I  have reviewed all pertinent imaging results/findings within the past 24 hours.      Assessment/Plan:      * Sepsis  Mary's Gangrene of Scrotum  Leukocytosis  Lactic acidosis  Elevated troponin  - Patient criteria for sepsis with infective focus of Mary gangrene of the scrotum  - Lactic acid level 4.4, procalcitonin >48, WBC 24, and elevated troponin levels that trended down  - Urology consulted and patient underwent excision and debridement of necrotic scrotal tissue on 11/15; repeat debridement 11/17  - Patient was on cefepime 1g IV q.12, clindamycin 900 mg IV Q 8, metronidazole 500 mg IV Q 8 and vancomycin dosed by pharmacy  - Elevated troponins likely secondary to sepsis and setting of DONNA given no reported chest pain and no acute EKG changes  - Blood cultures no growth to date and wound cultures with Anaerococcus and Prevotella species  - Wound care ordered and Urology following  - Clinically was improving with resolution of lactic acidosis and definitive debridement  - Adjust pain medication regimen with addition of medication for breakthrough pain  - ID consult appreciated, and clindamycin and vancomycin stopped on 11/21  - Ricketts removed on 11/21  - Urology performed beside debridement on 11/22  - Clinically worse on 11/23 and repeat blood cultures NGTD, UA negative and CXR with edema  - Stop IV fluids and restarted lasix  - Follow up studies and await sensitivities    DONNA (acute kidney injury)  - Creatinine 2.5, baseline- 1.3; likely due to hypotension/sepsis  - Improved with IV fluids and treatment of sepsis  - Creatinine down to normal levels  - Stopped IV fluids  - Continue monitor with repeat labs and follow I/O's    Chronic diastolic heart failure  CAD  - Last echo with normal EF and grade 1 diastolic dysfunction on 10/28/2022  - No overt signs of volume overload and no reported chest pain  - Elevated troponin due to sepsis with associated hypotension along with DONNA and CHF which were trended  "down and no ectopy noted on telemetry, and no acute EKG changes  - Continue digoxin 125 mcg p.o. daily, ASA 81 mg p.o. daily and ezetimibe 10 mg p.o. 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  - Glucoses levels reviewed, not at targets   - Increase detemir to 32U subQ daily, and moderate-dose sliding scale insulin aspart 1-10U subQ TIDWM PRN, add Novolog 5U QAC  - Will adjust insulin regimen as needed to achieve glucose between 140 - 180    Essential hypertension  - Hypotensive was on admit that responded to treatment with IV fluids with no need for pressor support  - Blood pressure predominantly in normal range; monitor.  - Restarted BP medication, substitute with losartan 100 mg daily    VTE Risk Mitigation (From admission, onward)         Ordered     IP VTE HIGH RISK PATIENT  Once         11/15/22 1948     Reason for No Pharmacological VTE Prophylaxis  Once        Question:  Reasons:  Answer:  Risk of Bleeding    11/15/22 1948     Place sequential compression device  Until discontinued         11/15/22 1936                      Darby Obrien MD  Department of Hospital Medicine   [facility] ([place])  "

## 2022-11-26 NOTE — ASSESSMENT & PLAN NOTE
- HgbA1c of 7.6  - Continue cardiac/diabetic diet.  - Glucoses levels reviewed, not at targets   - Increase detemir to 38U subQ daily, and moderate-dose sliding scale insulin aspart 1-10U subQ TIDWM PRN, add Novolog 8U QAC  - Will adjust insulin regimen as needed to achieve glucose between 140 - 180

## 2022-11-26 NOTE — ASSESSMENT & PLAN NOTE
- Noted runs of SVT on monitor  - Recently diagnosed and treated with digoxin   - Return overnight of SVT  - Reconsult Cardiology

## 2022-11-26 NOTE — ASSESSMENT & PLAN NOTE
66 yo male who presented with Mary's gangrene of scrotum.  Clinically stable    - Admitted to hospital medicine, appreciate assistance  - Debrided 11/15 and 11/17 in OR. Medial aspect of wound debrided at bedside 11/22.  - Voiding spontaneously  - Diet, insulin and pain control per primary    - Continue antibiotics per infectious disease recs. On cefepime/flagyl.  - Anaerobic culture with Anaerococcus murdochii and Prevotella bucchalis.  - Dressing changes BID/ RN staff. Wound care following.  - Rest of care per primary

## 2022-11-26 NOTE — PROGRESS NOTES
wound inspected prior to RN dressing change  no plans for debridement today   continue wound care    formal note to follow

## 2022-11-26 NOTE — ASSESSMENT & PLAN NOTE
- Hypotensive was on admit that responded to treatment with IV fluids with no need for pressor support  - Blood pressure predominantly in normal range; monitor.  - Restarted BP medication, substitute with losartan 100 mg daily

## 2022-11-26 NOTE — ASSESSMENT & PLAN NOTE
- Noted runs of SVT on monitor  - Recently diagnosed and treated with digoxin   - Return overnight of SVT   - Reconsult Cardiology, metoprolol added on 11/25  - No further episodes SVT, metoprolol change to 50 mg p.o. b.i.d. by Cardiology today

## 2022-11-26 NOTE — ASSESSMENT & PLAN NOTE
CAD  - Last echo with normal EF and grade 1 diastolic dysfunction on 10/28/2022  - No overt signs of volume overload and no reported chest pain  - Elevated troponin due to sepsis with associated hypotension along with DONNA and CHF which were trended down and no ectopy noted on telemetry, and no acute EKG changes  - Continue digoxin 125 mcg p.o. daily, ASA 81 mg p.o. daily and ezetimibe 10 mg p.o. daily  - Restarted Lasix on 11/24

## 2022-11-26 NOTE — ASSESSMENT & PLAN NOTE
- Hypotensive was on admit that responded to treatment with IV fluids with no need for pressor support  - Blood pressure predominantly in normal range; monitor.  - Restarted BP medication, substitute with losartan 100 mg daily  - Cardiology added metoprolol

## 2022-11-26 NOTE — ASSESSMENT & PLAN NOTE
- Creatinine 2.5, baseline- 1.3; likely due to hypotension/sepsis  - Improved with IV fluids and treatment of sepsis  - Creatinine down to normal levels  - Stopped IV fluids  - Continue monitor with repeat labs and follow I/O's

## 2022-11-26 NOTE — PROGRESS NOTES
[facility] ([place])  Urology  Progress Note    Patient Name: Nithin Wagner  MRN: [id number]  Admission Date: 11/15/2022  Hospital Length of Stay: 11 days  Code Status: Full Code   Attending Provider: Darby Obrien MD   Primary Care Physician: Tushar Drew MD    Subjective:     HPI:  67M h/o DM2, CAD, HTN, HLD presents with scrotal pain and swelling. Started about 4 weeks ago and has been progressively worsening despite a couple courses of antibiotics (unsure which ones). He reports fevers and chills at home. He also reports nausea, decreased PO intake, multiple syncopal episodes, and stool incontinence. He is on continuous blood glucose monitoring and states blood sugars have been in 190s today. He was seen yesterday in urgent care and diagnosed with scrotal abscess with possible kami's; he referred to the ER but waited until today to come in.    He was hypotensive and tachycardic on arrival in the ED. Sepsis protocol was started. Labs and imaging are currently pending.         Interval History: Tolerating wound dressing changes without issues, voiding well    Review of Systems   All other systems reviewed and are negative.  Objective:     Temp:  [97.2 °F (36.2 °C)-98.2 °F (36.8 °C)] 97.6 °F (36.4 °C)  Pulse:  [] 85  Resp:  [16-20] 20  SpO2:  [94 %-96 %] 94 %  BP: (108-135)/(67-77) 117/77     Body mass index is 35.08 kg/m².           Drains       None                   Physical Exam  Genitourinary:     Penis: Normal.       Comments: Wound inspected. 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  Clinically stable    - Admitted to hospital medicine, appreciate assistance  - Debrided 11/15 and 11/17 in OR. Medial aspect of wound debrided at bedside 11/22.  - Voiding spontaneously  - Diet, insulin and pain control per primary    - Continue antibiotics per infectious disease recs. On cefepime/flagyl.  - Anaerobic culture with Anaerococcus murdochii and Prevotella bucchalis.  - Dressing changes BID/ RN staff. Wound care following.  - Rest of care per primary        VTE Risk Mitigation (From admission, onward)         Ordered     IP VTE HIGH RISK PATIENT  Once         11/15/22 1948     Reason for No Pharmacological VTE Prophylaxis  Once        Question:  Reasons:  Answer:  Risk of Bleeding    11/15/22 1948     Place sequential compression device  Until discontinued         11/15/22 1936                Jenny Panda MD  Urology  [facility] ([place])

## 2022-11-26 NOTE — PROGRESS NOTES
[facility] [place] Medicine  Progress Note    Patient Name: Nithin Wagner  MRN: [id number]  Patient Class: IP- Inpatient   Admission Date: 11/15/2022  Length of Stay: 11 days  Attending Physician: Darby Obrien MD  Primary Care Provider: Tushar Drew MD        Subjective:     Principal Problem:Sepsis        HPI:  The patient is a 67M with a past medical history of DM2, CAD, HTN, and HLD who presents with scrotal pain and swelling. Started about 4 weeks ago and has been progressively worsening despite a couple courses of antibiotics (unsure which ones). He reports fevers and chills at home, nausea, decreased PO intake, and multiple syncopal episodes with +incontinence of stool. Unable to urinate for the past 24 hours or so. Sugars 190s at home. He was seen yesterday in urgent care and referred to the ER, but says he wasn't able to make it at that time so he comes today instead. On initial workup, the patient has CT evidence of Fourniers gangrene.  He will be brought to the operating room emergently and admitted after to ICU for further management.      Overview/Hospital Course:  Mr. Wagner presented with scrotal pain and swelling.  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  Empiric initiated with cefepime, clindamycin, metronidazole, and vancomycin.  Urology consulted and he underwent emergent excision and debridement of necrotic tissue on 11/15. Repeat washout performed 11/17. Improved and stepped down from ICU 11/18.  Bedside debridement performed on 11/22.  ID consulted and antibiotics regimen changed to cefepime and Flagyl known.  Wound cultures with Anaerococcus and Prevotella species; awaiting sensitivities.  Renal function normalized.  Return of SVT and Cardiology consulted with start of metoprolol.  Awaiting wound culture sensitivities.      Interval History:  No acute events, heart rate now controlled on metoprolol..  Feeling overall better today but still little swollen all  over.    Review of Systems   Constitutional:  Negative for chills and fever.   Respiratory:  Negative for cough and shortness of breath.    Cardiovascular:  Negative for chest pain.   Genitourinary:  Positive for scrotal swelling.   Objective:     Vital Signs (Most Recent):  Temp: 98.4 °F (36.9 °C) (11/26/22 1616)  Pulse: 84 (11/26/22 1616)  Resp: 20 (11/26/22 1616)  BP: 122/82 (11/26/22 1616)  SpO2: 96 % (11/26/22 1616)   Vital Signs (24h Range):  Temp:  [97.2 °F (36.2 °C)-98.4 °F (36.9 °C)] 98.4 °F (36.9 °C)  Pulse:  [] 84  Resp:  [16-20] 20  SpO2:  [94 %-96 %] 96 %  BP: (108-124)/(67-82) 122/82     Weight: 114.1 kg (251 lb 8.7 oz)  Body mass index is 35.08 kg/m².    Intake/Output Summary (Last 24 hours) at 11/26/2022 1745  Last data filed at 11/26/2022 0250  Gross per 24 hour   Intake --   Output 1930 ml   Net -1930 ml        Physical Exam  Constitutional:       Appearance: He is well-developed. He is obese.   HENT:      Head: Normocephalic and atraumatic.      Nose: Nose normal.   Eyes:      Extraocular Movements: Extraocular movements intact.      Conjunctiva/sclera: Conjunctivae normal.   Cardiovascular:      Rate and Rhythm: Normal rate and regular rhythm.      Pulses:           Radial pulses are 2+ on the right side and 2+ on the left side.      Heart sounds: Normal heart sounds.   Pulmonary:      Effort: Pulmonary effort is normal.      Breath sounds: Normal breath sounds.   Abdominal:      General: Bowel sounds are normal. There is no distension.      Palpations: Abdomen is soft.      Tenderness: There is no abdominal tenderness. There is no guarding or rebound.   Musculoskeletal:         General: Normal range of motion.      Cervical back: Normal range of motion and neck supple.      Right lower leg: Edema present.      Left lower leg: Edema present.   Skin:     General: Skin is warm and dry.      Comments: Packing noted to left scrotum and an area of dark spot on right scrotum more like scab    Neurological:      Mental Status: He is alert and oriented to person, place, and time.      Motor: Motor function is intact.   Psychiatric:         Mood and Affect: Mood normal.         Behavior: Behavior normal.         Thought Content: Thought content normal.       Significant Labs: All pertinent labs within the past 24 hours have been reviewed.    Significant Imaging: I have reviewed all pertinent imaging results/findings within the past 24 hours.      Assessment/Plan:      * Sepsis  Mary's Gangrene of Scrotum  Leukocytosis  Lactic acidosis  Elevated troponin  - Patient criteria for sepsis with infective focus of Mary gangrene of the scrotum  - Lactic acid level 4.4, procalcitonin >48, WBC 24, and elevated troponin levels that trended down  - Urology consulted and patient underwent excision and debridement of necrotic scrotal tissue on 11/15; repeat debridement 11/17  - Patient was on cefepime 1g IV q.12, clindamycin 900 mg IV Q 8, metronidazole 500 mg IV Q 8 and vancomycin dosed by pharmacy  - Elevated troponins likely secondary to sepsis and setting of DONNA given no reported chest pain and no acute EKG changes  - Blood cultures no growth to date and wound cultures with Anaerococcus and Prevotella species  - Wound care ordered and Urology following  - Clinically was improving with resolution of lactic acidosis   - Adjust pain medication regimen with addition of medication for breakthrough pain  - ID consult appreciated, and clindamycin and vancomycin stopped, and patient continued cefepime 1g IV q.12 and metronidazole 500 mg IV Q 8 on  11/21  - Ricketts removed on 11/21  - Urology performed beside debridement on 11/22  - Clinically worse on 11/23; repeat blood cultures NGTD, UA negative and CXR with edema  - Stopped IV fluids and restarted lasix  - Clinically improving, follow up studies and await sensitivities for final recommendation for antibiotic regimen by ID along with clearance from Urology that the  "patient is ready for discharge  - Patient will need home health     DONNA (acute kidney injury)  - Creatinine 2.5, baseline- 1.3; likely due to hypotension/sepsis  - Improved with IV fluids and treatment of sepsis  - Creatinine down to normal levels  - Stopped IV fluids  - Continue monitor with repeat labs and follow I/O's    Chronic diastolic heart failure  CAD  - Last echo with normal EF and grade 1 diastolic dysfunction on 10/28/2022  - Elevated troponin due to sepsis with associated hypotension along with DONNA and CHF which were trended down and no ectopy noted on telemetry, and no acute EKG changes  - Continue digoxin 125 mcg p.o. daily, ASA 81 mg p.o. daily and ezetimibe 10 mg p.o. daily  - Restarted Lasix on 11/24  - Cardiology started metoprolol for SVT    SVT (supraventricular tachycardia)  - Noted runs of SVT on monitor  - Recently diagnosed and treated with digoxin   - Return overnight of SVT   - Reconsult Cardiology, metoprolol added on 11/25  - No further episodes SVT, metoprolol change to 50 mg p.o. b.i.d. 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  - Glucoses levels reviewed, not at targets   - Increase detemir to 38U subQ daily, and moderate-dose sliding scale insulin aspart 1-10U subQ TIDWM PRN, add Novolog 8U QAC  - Will adjust insulin regimen as needed to achieve glucose between 140 - 180    Essential hypertension  - Hypotensive was on admit that responded to treatment with IV fluids with no need for pressor support  - Blood pressure predominantly in normal range; monitor.  - Restarted BP " medication, substitute with losartan 100 mg daily  - Cardiology added metoprolol      VTE Risk Mitigation (From admission, onward)         Ordered     IP VTE HIGH RISK PATIENT  Once         11/15/22 1948     Reason for No Pharmacological VTE Prophylaxis  Once        Question:  Reasons:  Answer:  Risk of Bleeding    11/15/22 1948     Place sequential compression device  Until discontinued         11/15/22 1936                    Darby Obrien MD  Department of Hospital Medicine   [facility] ([place])

## 2022-11-26 NOTE — SUBJECTIVE & OBJECTIVE
Interval History:  No acute events, heart rate now controlled on metoprolol..  Feeling overall better today but still little swollen all over.    Review of Systems   Constitutional:  Negative for chills and fever.   Respiratory:  Negative for cough and shortness of breath.    Cardiovascular:  Negative for chest pain.   Genitourinary:  Positive for scrotal swelling.   Objective:     Vital Signs (Most Recent):  Temp: 98.4 °F (36.9 °C) (11/26/22 1616)  Pulse: 84 (11/26/22 1616)  Resp: 20 (11/26/22 1616)  BP: 122/82 (11/26/22 1616)  SpO2: 96 % (11/26/22 1616)   Vital Signs (24h Range):  Temp:  [97.2 °F (36.2 °C)-98.4 °F (36.9 °C)] 98.4 °F (36.9 °C)  Pulse:  [] 84  Resp:  [16-20] 20  SpO2:  [94 %-96 %] 96 %  BP: (108-124)/(67-82) 122/82     Weight: 114.1 kg (251 lb 8.7 oz)  Body mass index is 35.08 kg/m².    Intake/Output Summary (Last 24 hours) at 11/26/2022 1745  Last data filed at 11/26/2022 0250  Gross per 24 hour   Intake --   Output 1930 ml   Net -1930 ml        Physical Exam  Constitutional:       Appearance: He is well-developed. He is obese.   HENT:      Head: Normocephalic and atraumatic.      Nose: Nose normal.   Eyes:      Extraocular Movements: Extraocular movements intact.      Conjunctiva/sclera: Conjunctivae normal.   Cardiovascular:      Rate and Rhythm: Normal rate and regular rhythm.      Pulses:           Radial pulses are 2+ on the right side and 2+ on the left side.      Heart sounds: Normal heart sounds.   Pulmonary:      Effort: Pulmonary effort is normal.      Breath sounds: Normal breath sounds.   Abdominal:      General: Bowel sounds are normal. There is no distension.      Palpations: Abdomen is soft.      Tenderness: There is no abdominal tenderness. There is no guarding or rebound.   Musculoskeletal:         General: Normal range of motion.      Cervical back: Normal range of motion and neck supple.      Right lower leg: Edema present.      Left lower leg: Edema present.   Skin:      General: Skin is warm and dry.      Comments: Packing noted to left scrotum and an area of dark spot on right scrotum more like scab   Neurological:      Mental Status: He is alert and oriented to person, place, and time.      Motor: Motor function is intact.   Psychiatric:         Mood and Affect: Mood normal.         Behavior: Behavior normal.         Thought Content: Thought content normal.       Significant Labs: All pertinent labs within the past 24 hours have been reviewed.    Significant Imaging: I have reviewed all pertinent imaging results/findings within the past 24 hours.

## 2022-11-26 NOTE — ASSESSMENT & PLAN NOTE
CAD  - Last echo with normal EF and grade 1 diastolic dysfunction on 10/28/2022  - Elevated troponin due to sepsis with associated hypotension along with DONNA and CHF which were trended down and no ectopy noted on telemetry, and no acute EKG changes  - Continue digoxin 125 mcg p.o. daily, ASA 81 mg p.o. daily and ezetimibe 10 mg p.o. daily  - Restarted Lasix on 11/24  - Cardiology started metoprolol for SVT

## 2022-11-26 NOTE — PROGRESS NOTES
[facility])  Cardiology  Progress Note    Patient Name: Nithin Wagner  MRN: [id number]  Admission Date: 11/15/2022  Hospital Length of Stay: 11 days  Code Status: Full Code   Attending Physician: Darby Obrien MD   Primary Care Physician: Tushar Drew MD  Expected Discharge Date:   Principal Problem:Sepsis    Subjective:     Brief HPI:    Nithin Wagner is a 67 y.o.male with hypertension, hypercholesterolemia and diabetes mellitus type 2. He has coronary artery disease with intervention done of his right coronary artery in the past. In 10/2022 he noted an occasional high heart rate. He documented a narrow complex tachycardia on a rhythm strip. On 11/25/2022 he was admitted with Mary's. He has undergone several debridements. On 11/24/2022 he was noted to be tachycardic with a heart rate of 160 mg bpm. He received metoprolol iv and the SVT resolved. On 11/25/2022 he had several more prolonged spells of supraventricular tachycardia that self terminated.       Hospital Course:     11/25/2022: Metoprolol was begun.    Interval History:    No further SVT.    Overall slowly better.    Review of Systems   Constitutional: Positive for malaise/fatigue. Negative for chills and fever.   HENT:  Negative for nosebleeds.    Eyes:  Negative for vision loss in left eye and vision loss in right eye.   Cardiovascular:  Negative for chest pain, leg swelling, orthopnea, palpitations and paroxysmal nocturnal dyspnea.   Respiratory:  Negative for cough, hemoptysis, shortness of breath, sputum production and wheezing.    Hematologic/Lymphatic: Negative for bleeding problem. Does not bruise/bleed easily.   Skin:  Negative for color change and rash.   Musculoskeletal:  Negative for muscle weakness and myalgias.   Gastrointestinal:  Negative for abdominal pain, heartburn, hematemesis, hematochezia, melena, nausea and vomiting.   Genitourinary:         Pelvic pain.   Neurological:  Negative for dizziness, focal weakness,  headaches, light-headedness, vertigo and weakness.   Psychiatric/Behavioral:  Negative for altered mental status. The patient is not nervous/anxious.    Allergic/Immunologic: Negative for persistent infections.   Objective:     Vital Signs (Most Recent):  Temp: 97.6 °F (36.4 °C) (11/26/22 1142)  Pulse: 101 (11/26/22 1400)  Resp: 20 (11/26/22 1142)  BP: 117/77 (11/26/22 1142)  SpO2: (!) 94 % (11/26/22 1142)   Vital Signs (24h Range):  Temp:  [97.2 °F (36.2 °C)-98.2 °F (36.8 °C)] 97.6 °F (36.4 °C)  Pulse:  [] 101  Resp:  [16-20] 20  SpO2:  [94 %-96 %] 94 %  BP: (108-135)/(67-77) 117/77     Weight: 114.1 kg (251 lb 8.7 oz)  Body mass index is 35.08 kg/m².    SpO2: (!) 94 %  O2 Device (Oxygen Therapy): room air      Intake/Output Summary (Last 24 hours) at 11/26/2022 1424  Last data filed at 11/26/2022 0250  Gross per 24 hour   Intake 240 ml   Output 3430 ml   Net -3190 ml       Lines/Drains/Airways       Peripheral Intravenous Line  Duration                  Peripheral IV - Single Lumen 11/25/22 1342 Anterior;Left Wrist 1 day                    Physical Exam  Constitutional:       General: He is not in acute distress.     Appearance: Normal appearance. He is well-developed. He is not ill-appearing.   Eyes:      Conjunctiva/sclera:      Right eye: Right conjunctiva is not injected. No hemorrhage.     Left eye: Left conjunctiva is not injected. No hemorrhage.     Pupils:      Right eye: Pupil is round.      Left eye: Pupil is round.   Neck:      Vascular: No JVD.   Cardiovascular:      Rate and Rhythm: Normal rate and regular rhythm.      Heart sounds: S1 normal and S2 normal.     Gallop present. S4 sounds present.   Pulmonary:      Effort: Pulmonary effort is normal.      Breath sounds: Normal breath sounds.   Chest:      Chest wall: No swelling or tenderness.   Abdominal:      General: There is no distension.      Palpations: Abdomen is soft.      Tenderness: There is no abdominal tenderness.   Musculoskeletal:       Cervical back: Neck supple.      Right ankle: No swelling.      Left ankle: No swelling.   Skin:     General: Skin is warm and dry.      Findings: No rash.   Neurological:      Mental Status: He is alert and oriented to person, place, and time. He is not disoriented.   Psychiatric:         Attention and Perception: Attention normal.         Mood and Affect: Mood normal.         Speech: Speech normal.         Behavior: Behavior normal.         Thought Content: Thought content normal.         Cognition and Memory: Cognition and memory normal.         Judgment: Judgment normal.       Current Medications:     aspirin  81 mg Oral Daily    ceFEPime (MAXIPIME) IVPB  1 g Intravenous Q8H    digoxin  125 mcg Oral Daily    ezetimibe  10 mg Oral Daily    folic acid  1 mg Oral Daily    furosemide  40 mg Oral BID    insulin aspart U-100  5 Units Subcutaneous TIDWM    insulin detemir U-100  32 Units Subcutaneous Daily    losartan  100 mg Oral Daily    metoprolol tartrate  25 mg Oral 4 times per day    metroNIDAZOLE  500 mg Oral Q8H    multivitamin  1 tablet Oral Daily    nystatin  5 mL Oral QID (WM & HS)     Current Laboratory Results:    Recent Results (from the past 24 hour(s))   Magnesium    Collection Time: 11/26/22  4:18 AM   Result Value Ref Range    Magnesium 1.8 1.6 - 2.6 mg/dL   Phosphorus    Collection Time: 11/26/22  4:18 AM   Result Value Ref Range    Phosphorus 3.3 2.7 - 4.5 mg/dL   Basic Metabolic Panel    Collection Time: 11/26/22  4:18 AM   Result Value Ref Range    Sodium 136 136 - 145 mmol/L    Potassium 4.0 3.5 - 5.1 mmol/L    Chloride 94 (L) 95 - 110 mmol/L    CO2 35 (H) 23 - 29 mmol/L    Glucose 238 (H) 70 - 110 mg/dL    BUN 6 (L) 8 - 23 mg/dL    Creatinine 0.8 0.5 - 1.4 mg/dL    Calcium 8.8 8.7 - 10.5 mg/dL    Anion Gap 7 (L) 8 - 16 mmol/L    eGFR >60 >60 mL/min/1.73 m^2   CBC Auto Differential    Collection Time: 11/26/22  4:18 AM   Result Value Ref Range    WBC 8.58 3.90 - 12.70 K/uL    RBC 4.35 (L)  4.60 - 6.20 M/uL    Hemoglobin 14.6 14.0 - 18.0 g/dL    Hematocrit 44.2 40.0 - 54.0 %     (H) 82 - 98 fL    MCH 33.6 (H) 27.0 - 31.0 pg    MCHC 33.0 32.0 - 36.0 g/dL    RDW 12.7 11.5 - 14.5 %    Platelets 327 150 - 450 K/uL    MPV 11.3 9.2 - 12.9 fL    Immature Granulocytes 1.9 (H) 0.0 - 0.5 %    Gran # (ANC) 6.0 1.8 - 7.7 K/uL    Immature Grans (Abs) 0.16 (H) 0.00 - 0.04 K/uL    Lymph # 1.4 1.0 - 4.8 K/uL    Mono # 0.9 0.3 - 1.0 K/uL    Eos # 0.1 0.0 - 0.5 K/uL    Baso # 0.08 0.00 - 0.20 K/uL    nRBC 0 0 /100 WBC    Gran % 70.0 38.0 - 73.0 %    Lymph % 16.2 (L) 18.0 - 48.0 %    Mono % 10.0 4.0 - 15.0 %    Eosinophil % 1.0 0.0 - 8.0 %    Basophil % 0.9 0.0 - 1.9 %    Differential Method Automated    POCT Glucose, Hand-Held Device    Collection Time: 11/26/22  7:00 AM   Result Value Ref Range    POC Glucose 219 (A) 70 - 110 MG/DL   POCT Glucose, Hand-Held Device    Collection Time: 11/26/22 12:00 PM   Result Value Ref Range    POC Glucose 271 (A) 70 - 110 MG/DL     Current Imaging Results:    X-Ray Chest AP Portable   Final Result      Radiographic findings as above, continued follow-up recommended.         Electronically signed by: Luis Moore   Date:    11/23/2022   Time:    21:27      CT Abdomen Pelvis With Contrast   Final Result   Abnormal      1. Extensive left-sided scrotal air with diffuse scrotal wall thickening, edema, and inflammatory changes seen which extend into the left perineal/inguinal region.  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   2. Additional findings as detailed above   This report was flagged in Epic as abnormal.         Electronically signed by: Latanya Liz MD   Date:    11/15/2022   Time:    19:47      X-Ray Chest AP Portable   Final Result      New region of consolidation in the right mid lung zone which is most suggestive for pneumonia given short-term interval development.  Possible small right pleural effusion.  Future radiographic follow-up is recommended  to ensure resolution.         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 Chronic systolic heart failure  Compensated.      2. Atherosclerosis of native coronary artery of native heart without angina pectoris  Stable and free of angina after right coronary intervention     3. Supraventricular Tachycardia              10/2022: Diagnosed.   On digoxin 0.125 mg Q24.              11/25/2022: Recurrent long runs.   11/25/2022: Metoprolol was begun.              On digoxin 0.125 mg Q24 and metoprolol 25 mg Q6.   No recurrence.    11/26/2022: Metoprolol 25 mg Q6 to be chnaged to metoprolol 50 mg Q12.    4. Atherosclerotic peripheral vascular disease  Asymptomatic     5. Essential hypertension  Controlled     6. Type 2 diabetes mellitus with other circulatory complication, with long-term current use of insulin  Followed by endocrinology     7. Severe obesity  Unchanged     8. Chronic obstructive pulmonary disease, unspecified COPD type  Seems to be a baseline.     9. Mary's              11/15/2022: Presented.              Several debridements.              On antibiotics iv.      VTE Risk  Mitigation (From admission, onward)           Ordered     IP VTE HIGH RISK PATIENT  Once         11/15/22 1948     Reason for No Pharmacological VTE Prophylaxis  Once        Question:  Reasons:  Answer:  Risk of Bleeding    11/15/22 1948     Place sequential compression device  Until discontinued         11/15/22 1936                    Laila Norton MD  Cardiology  [facility] ([place])

## 2022-11-26 NOTE — CONSULTS
[facility] ([place])  Cardiology  Consult Note    Patient Name: Nithin Wagner  MRN: [id number]  Admission Date: 11/15/2022  Hospital Length of Stay: 10 days  Code Status: Full Code   Attending Provider: Darby Obrien MD   Consulting Provider: Laila Norton MD  Primary Care Physician: Tushar Drew MD  Principal Problem:Sepsis    Patient information was obtained from patient, past medical records, and ER records.     Inpatient consult to Cardiology  Consult performed by: Laila Norton MD  Consult ordered by: Darby Obrien MD  Reason for consult: SVT      Subjective:     Chief Complaint:       HPI:     Nithin Wagner is a 67 y.o.male with hypertension, hypercholesterolemia and diabetes mellitus type 2. He has coronary artery disease with intervention done of his right coronary artery in the past. In 10/2022 he noted an occasional high heart rate. He documented a narrow complex tachycardia on a rhythm strip. On 11/25/2022 he was admitted with Mary's. He has undergone several debridements. On 11/24/2022 he was noted to be tachycardic with a heart rate of 160 mg bpm. He received metoprolol iv and the SVT resolved. On 11/25/2022 he has had several more prolonged spells of SVT that has self terminated.       Past Medical History:   Diagnosis Date    Arthritis     Atrial myxoma     CHF (congestive heart failure)     Coronary atherosclerosis     Diabetes mellitus, type 2     Difficult intubation     Hepatitis B     Hyperlipidemia     Hypertension     Non-alcoholic fatty liver disease     Rheumatoid arthritis     Rheumatoid arthritis flare 07/12/2021    Stroke     TIA    Systolic heart failure        Past Surgical History:   Procedure Laterality Date    CORONARY ANGIOGRAPHY N/A 3/10/2021    Procedure: ANGIOGRAM, CORONARY ARTERY - right radial;  Surgeon: Shemar Dempsey MD;  Location: [facility] CATH LAB;  Service: Cardiology;  Laterality: N/A;    CORONARY STENT PLACEMENT  03/10/2021    prox-mid RCA [place] 4.5 x 26 mm,  4.5 x 12 mm    INCISION AND DRAINAGE OF SCROTUM N/A 11/15/2022    Procedure: INCISION AND DRAINAGE, SCROTUM;  Surgeon: William Hatch MD;  Location: [facility] OR;  Service: Urology;  Laterality: N/A;    INCISION AND DRAINAGE OF SCROTUM N/A 11/17/2022    Procedure: INCISION AND DRAINAGE, SCROTUM;  Surgeon: William Hatch MD;  Location: [facility] OR;  Service: Urology;  Laterality: N/A;    LUNG LOBECTOMY Right 2008    RUL lobectomy after removal of atrial myxoma    PLEURA BIOPSY      RESECTION OF ATRIAL MYXOMA  2007       Review of patient's allergies indicates:   Allergen Reactions    Enbrel [etanercept] Shortness Of Breath     CHF    Nsaids (non-steroidal anti-inflammatory drug) Other (See Comments)     hypertention  Other reaction(s): Other (See Comments)  hypertention  HTN    Statins-hmg-coa reductase inhibitors Other (See Comments)     Heart arrythemias  Other reaction(s): Other (See Comments)  Heart arrythemias  Joint pain and cardiac arrythmias    Pcn [penicillins] Rash       No current facility-administered medications on file prior to encounter.     Current Outpatient Medications on File Prior to Encounter   Medication Sig    aspirin 81 MG Chew Take 81 mg by mouth.    blood sugar diagnostic Strp To check BG 3 times daily, to use with insurance preferred meter    blood-glucose meter kit Use to check glucose as directed    ciclopirox (PENLAC) 8 % Soln Apply topically nightly.    clotrimazole (LOTRIMIN) 1 % cream Apply topically 2 (two) times daily.    digoxin (LANOXIN) 125 mcg tablet Take 1 tablet (125 mcg total) by mouth once daily.    dulaglutide (TRULICITY) 4.5 mg/0.5 mL pen injector Inject 4.5 mg into the skin every 7 days.    ergocalciferol (ERGOCALCIFEROL) 50,000 unit Cap Take 1 capsule (50,000 Units total) by mouth every 7 days.    ezetimibe (ZETIA) 10 mg tablet TAKE 1 TABLET BY MOUTH EVERY DAY    folic acid (FOLVITE) 1 MG tablet     furosemide (LASIX) 40 MG tablet Take 1 tablet (40 mg total) by mouth once  "daily. (Patient taking differently: Take 40 mg by mouth every other day. Alternates 40mg and 80mg every other day)    hydroCHLOROthiazide (HYDRODIURIL) 25 MG tablet Take 1 tablet (25 mg total) by mouth once daily. (Patient taking differently: Take 25 mg by mouth as needed.)    hydrocodone-acetaminophen 10-325mg (NORCO)  mg Tab     imiquimod (ALDARA) 5 % cream APPLY 3 DAYS A WEEK TO WARTS UNTIL COMPLETE CLEARANCE OF WARTS    insulin admin supplies InPn InPen device, use to inject mealtime insulin 3 times daily. 1 pen to use, 1 for backup as needed.    insulin degludec (TRESIBA FLEXTOUCH U-200) 200 unit/mL (3 mL) insulin pen Inject 50 Units into the skin 2 (two) times a day.    lamiVUDine (EPIVIR) 150 MG Tab Take 1 tablet (150 mg total) by mouth once daily.    LINZESS 72 mcg Cap capsule     minocycline (MINOCIN,DYNACIN) 100 MG capsule Take 100 mg by mouth 2 (two) times daily.    multivitamin (THERAGRAN) per tablet Take 1 tablet by mouth once daily.    nitroGLYCERIN (NITROSTAT) 0.4 MG SL tablet Place 1 tablet (0.4 mg total) under the tongue every 5 (five) minutes as needed for Chest pain.    NOVOLOG PENFILL U-100 INSULIN 100 unit/mL Crtg INJECT 30 UNITS INTO THE SKIN THREE TIMES A DAY WITH MEALS. MAX DAILY DOSE 100 UNITS PER DAY    nystatin (MYCOSTATIN) cream Apply topically 3 (three) times daily as needed for Dry Skin.    olmesartan (BENICAR) 40 MG tablet Take 1 tablet (40 mg total) by mouth nightly.    ondansetron (ZOFRAN) 4 MG tablet Take 1 tablet (4 mg total) by mouth every 8 (eight) hours as needed for Nausea.    OXYGEN-AIR DELIVERY SYSTEMS MISC by [facility].(Non-Drug; Combo Route) route.    pen needle, diabetic (BD ULTRA-FINE RAHUL PEN NEEDLE) 32 gauge x 5/32" Ndle Use to inject insulin 5 times daily    potassium chloride (MICRO-K) 10 MEQ CpSR Take 10 mEq by mouth once daily.    predniSONE (DELTASONE) 10 MG tablet Take 1 tablet (10 mg total) by mouth once daily.    predniSONE (DELTASONE) 2.5 MG tablet Take 1 " tablet (2.5 mg total) by mouth once daily.    predniSONE (DELTASONE) 2.5 MG tablet TAKE 3 TABLETS (7.5 MG TOTAL) BY MOUTH ONCE DAILY.    predniSONE (DELTASONE) 5 MG tablet TAKE 1 TABLET BY MOUTH EVERY DAY    PROAIR HFA 90 mcg/actuation inhaler     spironolactone (ALDACTONE) 25 MG tablet TAKE 1 TABLET BY MOUTH EVERY DAY    sulfamethoxazole-trimethoprim 800-160mg (BACTRIM DS) 800-160 mg Tab TAKE 1 TABLET BY MOUTH ON MON,WEDS, FRIDAY    vortioxetine (TRINTELLIX) 10 mg Tab Take 10 mg by mouth once daily at 6am.     Family History       Problem Relation (Age of Onset)    Cancer Sister    Diabetes type I Father    Diabetes type II Mother    Hodgkin's lymphoma Mother    Kidney failure Father          Tobacco Use    Smoking status: Some Days     Packs/day: 1.00     Years: 35.00     Pack years: 35.00     Types: Cigarettes    Smokeless tobacco: Never   Substance and Sexual Activity    Alcohol use: Not Currently    Drug use: No    Sexual activity: Never     Review of Systems   Constitutional: Positive for malaise/fatigue. Negative for chills and fever.   HENT:  Negative for nosebleeds and tinnitus.    Eyes:  Negative for double vision, vision loss in left eye and vision loss in right eye.   Cardiovascular:  Positive for dyspnea on exertion. Negative for chest pain, claudication, irregular heartbeat, leg swelling, near-syncope, orthopnea, palpitations, paroxysmal nocturnal dyspnea and syncope.   Respiratory:  Positive for shortness of breath. Negative for cough, hemoptysis and wheezing.    Endocrine: Negative for cold intolerance and heat intolerance.   Hematologic/Lymphatic: Negative for bleeding problem. Does not bruise/bleed easily.   Skin:  Negative for color change and rash.   Musculoskeletal:  Negative for back pain, falls, muscle weakness and myalgias.   Gastrointestinal:  Negative for abdominal pain, diarrhea, dysphagia, heartburn, hematemesis, hematochezia, hemorrhoids, jaundice, melena, nausea and vomiting.    Genitourinary:         Pelvic pain.   Neurological:  Negative for dizziness, focal weakness, headaches, light-headedness, loss of balance, numbness, tremors, vertigo and weakness.   Psychiatric/Behavioral:  Negative for altered mental status, depression and memory loss. The patient is not nervous/anxious.    Allergic/Immunologic: Negative for hives and persistent infections.   Objective:     Vital Signs (Most Recent):  Temp: 98.2 °F (36.8 °C) (11/25/22 1626)  Pulse: (!) 157 (11/25/22 1830)  Resp: 16 (11/25/22 1828)  BP: 135/70 (11/25/22 1626)  SpO2: 95 % (11/25/22 1626)   Vital Signs (24h Range):  Temp:  [97.7 °F (36.5 °C)-98.9 °F (37.2 °C)] 98.2 °F (36.8 °C)  Pulse:  [] 157  Resp:  [16-20] 16  SpO2:  [92 %-96 %] 95 %  BP: (114-136)/(70-92) 135/70     Weight: 114.1 kg (251 lb 8.7 oz)  Body mass index is 35.08 kg/m².    SpO2: 95 %  O2 Device (Oxygen Therapy): nasal cannula w/ humidification      Intake/Output Summary (Last 24 hours) at 11/25/2022 1915  Last data filed at 11/25/2022 1704  Gross per 24 hour   Intake 889.14 ml   Output 3850 ml   Net -2960.86 ml       Lines/Drains/Airways       Peripheral Intravenous Line  Duration                  Peripheral IV - Single Lumen 11/25/22 1342 Anterior;Left Wrist <1 day                    Physical Exam  Constitutional:       General: He is not in acute distress.     Appearance: Normal appearance. He is well-developed. He is ill-appearing. He is not toxic-appearing or diaphoretic.   HENT:      Head: Normocephalic and atraumatic.      Nose: Nose normal.   Eyes:      General:         Right eye: No discharge.         Left eye: No discharge.      Conjunctiva/sclera:      Right eye: Right conjunctiva is not injected.      Left eye: Left conjunctiva is not injected.      Pupils: Pupils are equal.      Right eye: Pupil is round.      Left eye: Pupil is round.   Neck:      Thyroid: No thyromegaly.      Vascular: No carotid bruit or JVD.   Cardiovascular:      Rate and  Rhythm: Normal rate and regular rhythm. No extrasystoles are present.     Chest Wall: PMI is not displaced.      Pulses:           Radial pulses are 2+ on the right side and 2+ on the left side.        Femoral pulses are 2+ on the right side and 2+ on the left side.       Dorsalis pedis pulses are 2+ on the right side and 2+ on the left side.        Posterior tibial pulses are 2+ on the right side and 2+ on the left side.      Heart sounds: S1 normal and S2 normal.     No gallop.   Pulmonary:      Effort: Pulmonary effort is normal.      Breath sounds: Rhonchi present.   Abdominal:      Palpations: Abdomen is soft.      Tenderness: There is no abdominal tenderness.   Musculoskeletal:      Cervical back: Neck supple.      Right lower leg: Normal. No swelling. No edema.      Left lower leg: Normal. No swelling. No edema.   Lymphadenopathy:      Head:      Right side of head: No submandibular adenopathy.      Left side of head: No submandibular adenopathy.      Cervical: No cervical adenopathy.   Skin:     General: Skin is warm and dry.      Findings: No rash.      Nails: There is no clubbing.   Neurological:      General: No focal deficit present.      Mental Status: He is alert and oriented to person, place, and time. He is not disoriented.      Cranial Nerves: No cranial nerve deficit.   Psychiatric:         Attention and Perception: Attention normal.         Mood and Affect: Mood and affect normal.         Speech: Speech normal.         Behavior: Behavior normal.         Thought Content: Thought content normal.         Cognition and Memory: Cognition and memory normal.         Judgment: Judgment normal.       Current Medications:     aspirin  81 mg Oral Daily    ceFEPime (MAXIPIME) IVPB  1 g Intravenous Q8H    digoxin  125 mcg Oral Daily    ezetimibe  10 mg Oral Daily    folic acid  1 mg Oral Daily    furosemide  40 mg Oral BID    insulin detemir U-100  28 Units Subcutaneous Daily    losartan  100 mg Oral Daily     metroNIDAZOLE  500 mg Oral Q8H    multivitamin  1 tablet Oral Daily    nystatin  5 mL Oral QID (WM & HS)     Current Laboratory Results:    Recent Results (from the past 24 hour(s))   POCT glucose    Collection Time: 11/24/22 11:20 PM   Result Value Ref Range    POCT Glucose 187 (H) 70 - 110 mg/dL   Magnesium    Collection Time: 11/25/22  5:30 AM   Result Value Ref Range    Magnesium 1.8 1.6 - 2.6 mg/dL   Phosphorus    Collection Time: 11/25/22  5:30 AM   Result Value Ref Range    Phosphorus 3.8 2.7 - 4.5 mg/dL   Basic Metabolic Panel    Collection Time: 11/25/22  5:30 AM   Result Value Ref Range    Sodium 137 136 - 145 mmol/L    Potassium 4.1 3.5 - 5.1 mmol/L    Chloride 98 95 - 110 mmol/L    CO2 30 (H) 23 - 29 mmol/L    Glucose 210 (H) 70 - 110 mg/dL    BUN 5 (L) 8 - 23 mg/dL    Creatinine 0.8 0.5 - 1.4 mg/dL    Calcium 8.6 (L) 8.7 - 10.5 mg/dL    Anion Gap 9 8 - 16 mmol/L    eGFR >60 >60 mL/min/1.73 m^2   CBC Auto Differential    Collection Time: 11/25/22  5:30 AM   Result Value Ref Range    WBC 7.55 3.90 - 12.70 K/uL    RBC 4.44 (L) 4.60 - 6.20 M/uL    Hemoglobin 14.9 14.0 - 18.0 g/dL    Hematocrit 45.2 40.0 - 54.0 %     (H) 82 - 98 fL    MCH 33.6 (H) 27.0 - 31.0 pg    MCHC 33.0 32.0 - 36.0 g/dL    RDW 12.9 11.5 - 14.5 %    Platelets 321 150 - 450 K/uL    MPV 11.4 9.2 - 12.9 fL    Immature Granulocytes 2.4 (H) 0.0 - 0.5 %    Gran # (ANC) 5.2 1.8 - 7.7 K/uL    Immature Grans (Abs) 0.18 (H) 0.00 - 0.04 K/uL    Lymph # 1.3 1.0 - 4.8 K/uL    Mono # 0.7 0.3 - 1.0 K/uL    Eos # 0.1 0.0 - 0.5 K/uL    Baso # 0.02 0.00 - 0.20 K/uL    nRBC 0 0 /100 WBC    Gran % 69.0 38.0 - 73.0 %    Lymph % 17.2 (L) 18.0 - 48.0 %    Mono % 9.8 4.0 - 15.0 %    Eosinophil % 1.3 0.0 - 8.0 %    Basophil % 0.3 0.0 - 1.9 %    Differential Method Automated    POCT glucose    Collection Time: 11/25/22  8:00 AM   Result Value Ref Range    POCT Glucose 199 (H) 70 - 110 mg/dL   POCT glucose    Collection Time: 11/25/22 11:57 AM   Result  Value Ref Range    POCT Glucose 210 (H) 70 - 110 mg/dL     Current Imaging Results:      Telemetry strips:  bpm.      10/28/2022: Echo:     The left ventricle is normal in size with concentric hypertrophy and normal systolic function.  Grade I left ventricular diastolic dysfunction.  Normal right ventricular size with normal right ventricular systolic function.  Thickened and calcified posterior mitral valve leaflet.    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 Chronic systolic heart failure  Compensated.      2. Atherosclerosis of native coronary artery of native heart without angina pectoris  Stable and free of angina after right coronary intervention    3. SVT   10/2022: Diagnosed.   11/25/2022: Recurrent long runs.   Will give metoprolol 25 mg Q6.    4. Atherosclerotic peripheral vascular disease  Asymptomatic     5. Essential hypertension  Controlled     6. Type 2 diabetes mellitus with other circulatory complication, with long-term current use of insulin  Followed by Endocrinology     7. Severe obesity  Unchanged    8. Chronic obstructive pulmonary disease, unspecified COPD type  Seems to be a baseline.     9. Mary's   11/15/2022: Presented.   Several debridements.   On antibiotics iv.        VTE Risk Mitigation (From admission, onward)            Ordered     IP VTE HIGH RISK PATIENT  Once         11/15/22 1948     Reason for No Pharmacological VTE Prophylaxis  Once        Question:  Reasons:  Answer:  Risk of Bleeding    11/15/22 1948     Place sequential compression device  Until discontinued         11/15/22 1936                    Thank you for your consult.     I will follow-up with patient. Please contact us if you have any additional questions.    Laila Norton MD  Cardiology   [facility] ([place])

## 2022-11-26 NOTE — ASSESSMENT & PLAN NOTE
Mary's Gangrene of Scrotum  Leukocytosis  Lactic acidosis  Elevated troponin  - Patient criteria for sepsis with infective focus of Mary gangrene of the scrotum  - Lactic acid level 4.4, procalcitonin >48, WBC 24, and elevated troponin levels that trended down  - Urology consulted and patient underwent excision and debridement of necrotic scrotal tissue on 11/15; repeat debridement 11/17  - Patient was on cefepime 1g IV q.12, clindamycin 900 mg IV Q 8, metronidazole 500 mg IV Q 8 and vancomycin dosed by pharmacy  - Elevated troponins likely secondary to sepsis and setting of DONNA given no reported chest pain and no acute EKG changes  - Blood cultures no growth to date and wound cultures with Anaerococcus and Prevotella species  - Wound care ordered and Urology following  - Clinically was improving with resolution of lactic acidosis and definitive debridement  - Adjust pain medication regimen with addition of medication for breakthrough pain  - ID consult appreciated, and clindamycin and vancomycin stopped on 11/21  - Ricketts removed on 11/21  - Urology performed beside debridement on 11/22  - Clinically worse on 11/23 and repeat blood cultures NGTD, UA negative and CXR with edema  - Stop IV fluids and restarted lasix  - Follow up studies and await sensitivities

## 2022-11-26 NOTE — ASSESSMENT & PLAN NOTE
Mary's Gangrene of Scrotum  Leukocytosis  Lactic acidosis  Elevated troponin  - Patient criteria for sepsis with infective focus of Mary gangrene of the scrotum  - Lactic acid level 4.4, procalcitonin >48, WBC 24, and elevated troponin levels that trended down  - Urology consulted and patient underwent excision and debridement of necrotic scrotal tissue on 11/15; repeat debridement 11/17  - Patient was on cefepime 1g IV q.12, clindamycin 900 mg IV Q 8, metronidazole 500 mg IV Q 8 and vancomycin dosed by pharmacy  - Elevated troponins likely secondary to sepsis and setting of DONNA given no reported chest pain and no acute EKG changes  - Blood cultures no growth to date and wound cultures with Anaerococcus and Prevotella species  - Wound care ordered and Urology following  - Clinically was improving with resolution of lactic acidosis   - Adjust pain medication regimen with addition of medication for breakthrough pain  - ID consult appreciated, and clindamycin and vancomycin stopped, and patient continued cefepime 1g IV q.12 and metronidazole 500 mg IV Q 8 on  11/21  - Ricketts removed on 11/21  - Urology performed beside debridement on 11/22  - Clinically worse on 11/23; repeat blood cultures NGTD, UA negative and CXR with edema  - Stopped IV fluids and restarted lasix  - Clinically improving, follow up studies and await sensitivities for final recommendation for antibiotic regimen by ID along with clearance from Urology that the patient is ready for discharge  - Patient will need home health

## 2022-11-27 LAB
ANION GAP SERPL CALC-SCNC: 8 MMOL/L (ref 8–16)
BASOPHILS # BLD AUTO: 0.06 K/UL (ref 0–0.2)
BASOPHILS NFR BLD: 0.7 % (ref 0–1.9)
BUN SERPL-MCNC: 7 MG/DL (ref 8–23)
CALCIUM SERPL-MCNC: 8.9 MG/DL (ref 8.7–10.5)
CHLORIDE SERPL-SCNC: 96 MMOL/L (ref 95–110)
CO2 SERPL-SCNC: 32 MMOL/L (ref 23–29)
CREAT SERPL-MCNC: 0.8 MG/DL (ref 0.5–1.4)
DIFFERENTIAL METHOD: ABNORMAL
EOSINOPHIL # BLD AUTO: 0.1 K/UL (ref 0–0.5)
EOSINOPHIL NFR BLD: 1 % (ref 0–8)
ERYTHROCYTE [DISTWIDTH] IN BLOOD BY AUTOMATED COUNT: 12.6 % (ref 11.5–14.5)
EST. GFR  (NO RACE VARIABLE): >60 ML/MIN/1.73 M^2
GLUCOSE SERPL-MCNC: 261 MG/DL (ref 70–110)
GLUCOSE SERPL-MCNC: 285 MG/DL (ref 70–110)
GLUCOSE SERPL-MCNC: 327 MG/DL (ref 70–110)
HCT VFR BLD AUTO: 43.8 % (ref 40–54)
HGB BLD-MCNC: 14.5 G/DL (ref 14–18)
IMM GRANULOCYTES # BLD AUTO: 0.16 K/UL (ref 0–0.04)
IMM GRANULOCYTES NFR BLD AUTO: 1.7 % (ref 0–0.5)
LYMPHOCYTES # BLD AUTO: 1.6 K/UL (ref 1–4.8)
LYMPHOCYTES NFR BLD: 17 % (ref 18–48)
MAGNESIUM SERPL-MCNC: 1.8 MG/DL (ref 1.6–2.6)
MCH RBC QN AUTO: 33.6 PG (ref 27–31)
MCHC RBC AUTO-ENTMCNC: 33.1 G/DL (ref 32–36)
MCV RBC AUTO: 101 FL (ref 82–98)
MONOCYTES # BLD AUTO: 1.1 K/UL (ref 0.3–1)
MONOCYTES NFR BLD: 11.7 % (ref 4–15)
NEUTROPHILS # BLD AUTO: 6.2 K/UL (ref 1.8–7.7)
NEUTROPHILS NFR BLD: 67.9 % (ref 38–73)
NRBC BLD-RTO: 0 /100 WBC
PHOSPHATE SERPL-MCNC: 3.5 MG/DL (ref 2.7–4.5)
PLATELET # BLD AUTO: 325 K/UL (ref 150–450)
PMV BLD AUTO: 11.3 FL (ref 9.2–12.9)
POTASSIUM SERPL-SCNC: 4 MMOL/L (ref 3.5–5.1)
RBC # BLD AUTO: 4.32 M/UL (ref 4.6–6.2)
SODIUM SERPL-SCNC: 136 MMOL/L (ref 136–145)
WBC # BLD AUTO: 9.17 K/UL (ref 3.9–12.7)

## 2022-11-27 PROCEDURE — 94640 AIRWAY INHALATION TREATMENT: CPT

## 2022-11-27 PROCEDURE — 25000003 PHARM REV CODE 250: Performed by: HOSPITALIST

## 2022-11-27 PROCEDURE — 99024 PR POST-OP FOLLOW-UP VISIT: ICD-10-PCS | Mod: ,,, | Performed by: STUDENT IN AN ORGANIZED HEALTH CARE EDUCATION/TRAINING PROGRAM

## 2022-11-27 PROCEDURE — 99233 PR SUBSEQUENT HOSPITAL CARE,LEVL III: ICD-10-PCS | Mod: 95,,, | Performed by: STUDENT IN AN ORGANIZED HEALTH CARE EDUCATION/TRAINING PROGRAM

## 2022-11-27 PROCEDURE — 63600175 PHARM REV CODE 636 W HCPCS: Performed by: INTERNAL MEDICINE

## 2022-11-27 PROCEDURE — 99900035 HC TECH TIME PER 15 MIN (STAT)

## 2022-11-27 PROCEDURE — 36415 COLL VENOUS BLD VENIPUNCTURE: CPT | Performed by: HOSPITALIST

## 2022-11-27 PROCEDURE — 25000003 PHARM REV CODE 250: Performed by: UROLOGY

## 2022-11-27 PROCEDURE — 99233 PR SUBSEQUENT HOSPITAL CARE,LEVL III: ICD-10-PCS | Mod: ,,, | Performed by: INTERNAL MEDICINE

## 2022-11-27 PROCEDURE — 94761 N-INVAS EAR/PLS OXIMETRY MLT: CPT

## 2022-11-27 PROCEDURE — 63600175 PHARM REV CODE 636 W HCPCS: Performed by: NURSE PRACTITIONER

## 2022-11-27 PROCEDURE — 25000003 PHARM REV CODE 250: Performed by: INTERNAL MEDICINE

## 2022-11-27 PROCEDURE — 99233 SBSQ HOSP IP/OBS HIGH 50: CPT | Mod: ,,, | Performed by: INTERNAL MEDICINE

## 2022-11-27 PROCEDURE — 27000221 HC OXYGEN, UP TO 24 HOURS

## 2022-11-27 PROCEDURE — 83735 ASSAY OF MAGNESIUM: CPT | Performed by: HOSPITALIST

## 2022-11-27 PROCEDURE — 99233 SBSQ HOSP IP/OBS HIGH 50: CPT | Mod: 95,,, | Performed by: STUDENT IN AN ORGANIZED HEALTH CARE EDUCATION/TRAINING PROGRAM

## 2022-11-27 PROCEDURE — 99024 POSTOP FOLLOW-UP VISIT: CPT | Mod: ,,, | Performed by: STUDENT IN AN ORGANIZED HEALTH CARE EDUCATION/TRAINING PROGRAM

## 2022-11-27 PROCEDURE — 85025 COMPLETE CBC W/AUTO DIFF WBC: CPT | Performed by: HOSPITALIST

## 2022-11-27 PROCEDURE — 84100 ASSAY OF PHOSPHORUS: CPT | Performed by: HOSPITALIST

## 2022-11-27 PROCEDURE — 80048 BASIC METABOLIC PNL TOTAL CA: CPT | Performed by: HOSPITALIST

## 2022-11-27 PROCEDURE — 25000242 PHARM REV CODE 250 ALT 637 W/ HCPCS: Performed by: UROLOGY

## 2022-11-27 PROCEDURE — 36410 VNPNXR 3YR/> PHY/QHP DX/THER: CPT

## 2022-11-27 PROCEDURE — 63600175 PHARM REV CODE 636 W HCPCS: Performed by: HOSPITALIST

## 2022-11-27 PROCEDURE — 21400001 HC TELEMETRY ROOM

## 2022-11-27 PROCEDURE — C1751 CATH, INF, PER/CENT/MIDLINE: HCPCS

## 2022-11-27 RX ORDER — IBUPROFEN 200 MG
24 TABLET ORAL
Status: DISCONTINUED | OUTPATIENT
Start: 2022-11-27 | End: 2022-12-01 | Stop reason: HOSPADM

## 2022-11-27 RX ORDER — INSULIN ASPART 100 [IU]/ML
1-10 INJECTION, SOLUTION INTRAVENOUS; SUBCUTANEOUS
Status: DISCONTINUED | OUTPATIENT
Start: 2022-11-27 | End: 2022-12-01 | Stop reason: HOSPADM

## 2022-11-27 RX ORDER — IBUPROFEN 200 MG
16 TABLET ORAL
Status: DISCONTINUED | OUTPATIENT
Start: 2022-11-27 | End: 2022-12-01 | Stop reason: HOSPADM

## 2022-11-27 RX ORDER — GLUCAGON 1 MG
1 KIT INJECTION
Status: DISCONTINUED | OUTPATIENT
Start: 2022-11-27 | End: 2022-12-01 | Stop reason: HOSPADM

## 2022-11-27 RX ADMIN — OXYCODONE 15 MG: 5 TABLET ORAL at 05:11

## 2022-11-27 RX ADMIN — THERA TABS 1 TABLET: TAB at 09:11

## 2022-11-27 RX ADMIN — INSULIN ASPART 8 UNITS: 100 INJECTION, SOLUTION INTRAVENOUS; SUBCUTANEOUS at 12:11

## 2022-11-27 RX ADMIN — FUROSEMIDE 40 MG: 40 TABLET ORAL at 09:11

## 2022-11-27 RX ADMIN — OXYCODONE 15 MG: 5 TABLET ORAL at 01:11

## 2022-11-27 RX ADMIN — LOSARTAN POTASSIUM 100 MG: 50 TABLET, FILM COATED ORAL at 09:11

## 2022-11-27 RX ADMIN — IPRATROPIUM BROMIDE AND ALBUTEROL SULFATE 3 ML: 2.5; .5 SOLUTION RESPIRATORY (INHALATION) at 08:11

## 2022-11-27 RX ADMIN — NYSTATIN 500000 UNITS: 500000 SUSPENSION ORAL at 05:11

## 2022-11-27 RX ADMIN — MORPHINE SULFATE 6 MG: 2 INJECTION, SOLUTION INTRAMUSCULAR; INTRAVENOUS at 09:11

## 2022-11-27 RX ADMIN — MORPHINE SULFATE 6 MG: 2 INJECTION, SOLUTION INTRAMUSCULAR; INTRAVENOUS at 02:11

## 2022-11-27 RX ADMIN — DIGOXIN 125 MCG: 125 TABLET ORAL at 09:11

## 2022-11-27 RX ADMIN — INSULIN DETEMIR 38 UNITS: 100 INJECTION, SOLUTION SUBCUTANEOUS at 09:11

## 2022-11-27 RX ADMIN — ONDANSETRON 4 MG: 2 INJECTION INTRAMUSCULAR; INTRAVENOUS at 03:11

## 2022-11-27 RX ADMIN — ASPIRIN 81 MG CHEWABLE TABLET 81 MG: 81 TABLET CHEWABLE at 09:11

## 2022-11-27 RX ADMIN — FOLIC ACID 1 MG: 1 TABLET ORAL at 09:11

## 2022-11-27 RX ADMIN — INSULIN ASPART 8 UNITS: 100 INJECTION, SOLUTION INTRAVENOUS; SUBCUTANEOUS at 05:11

## 2022-11-27 RX ADMIN — METRONIDAZOLE 500 MG: 500 TABLET ORAL at 02:11

## 2022-11-27 RX ADMIN — METOPROLOL TARTRATE 50 MG: 50 TABLET, FILM COATED ORAL at 09:11

## 2022-11-27 RX ADMIN — NYSTATIN 500000 UNITS: 500000 SUSPENSION ORAL at 12:11

## 2022-11-27 RX ADMIN — OXYCODONE 15 MG: 5 TABLET ORAL at 08:11

## 2022-11-27 RX ADMIN — OXYCODONE HYDROCHLORIDE AND ACETAMINOPHEN 1 TABLET: 5; 325 TABLET ORAL at 04:11

## 2022-11-27 RX ADMIN — OXYCODONE 15 MG: 5 TABLET ORAL at 11:11

## 2022-11-27 RX ADMIN — METRONIDAZOLE 500 MG: 500 TABLET ORAL at 10:11

## 2022-11-27 RX ADMIN — METOPROLOL TARTRATE 50 MG: 50 TABLET, FILM COATED ORAL at 08:11

## 2022-11-27 RX ADMIN — INSULIN ASPART 3 UNITS: 100 INJECTION, SOLUTION INTRAVENOUS; SUBCUTANEOUS at 09:11

## 2022-11-27 RX ADMIN — METRONIDAZOLE 500 MG: 500 TABLET ORAL at 05:11

## 2022-11-27 RX ADMIN — CEFEPIME HYDROCHLORIDE 1 G: 1 INJECTION, SOLUTION INTRAVENOUS at 01:11

## 2022-11-27 RX ADMIN — CEFEPIME HYDROCHLORIDE 1 G: 1 INJECTION, SOLUTION INTRAVENOUS at 05:11

## 2022-11-27 RX ADMIN — METOROPROLOL TARTRATE 5 MG: 5 INJECTION, SOLUTION INTRAVENOUS at 04:11

## 2022-11-27 RX ADMIN — EZETIMIBE 10 MG: 10 TABLET ORAL at 09:11

## 2022-11-27 RX ADMIN — FUROSEMIDE 40 MG: 40 TABLET ORAL at 05:11

## 2022-11-27 RX ADMIN — INSULIN ASPART 8 UNITS: 100 INJECTION, SOLUTION INTRAVENOUS; SUBCUTANEOUS at 09:11

## 2022-11-27 RX ADMIN — NYSTATIN 500000 UNITS: 500000 SUSPENSION ORAL at 08:11

## 2022-11-27 RX ADMIN — OXYCODONE HYDROCHLORIDE AND ACETAMINOPHEN 1 TABLET: 5; 325 TABLET ORAL at 10:11

## 2022-11-27 NOTE — PROGRESS NOTES
[facility] ([place])  Urology  Progress Note    Patient Name: Nithin Wagner  MRN: [id number]  Admission Date: 11/15/2022  Hospital Length of Stay: 12 days  Code Status: Full Code   Attending Provider: Abelino Pollock MD   Primary Care Physician: Tushar Drew MD    Subjective:     HPI:  67M h/o DM2, CAD, HTN, HLD presents with scrotal pain and swelling. Started about 4 weeks ago and has been progressively worsening despite a couple courses of antibiotics (unsure which ones). He reports fevers and chills at home. He also reports nausea, decreased PO intake, multiple syncopal episodes, and stool incontinence. He is on continuous blood glucose monitoring and states blood sugars have been in 190s today. He was seen yesterday in urgent care and diagnosed with scrotal abscess with possible kami's; he referred to the ER but waited until today to come in.    He was hypotensive and tachycardic on arrival in the ED. Sepsis protocol was started. Labs and imaging are currently pending.         Interval History: no new complaints, voiding well    Review of Systems   All other systems reviewed and are negative.  Objective:     Temp:  [97.9 °F (36.6 °C)-98.4 °F (36.9 °C)] 98 °F (36.7 °C)  Pulse:  [] 88  Resp:  [16-20] 16  SpO2:  [90 %-96 %] 90 %  BP: (113-131)/(60-82) 113/73     Body mass index is 35.08 kg/m².    Date 11/27/22 0700 - 11/28/22 0659   Shift 0631-7495 1439-9474 4479-4344 24 Hour Total   INTAKE   Shift Total(mL/kg)       OUTPUT   Urine(mL/kg/hr) 400   400   Shift Total(mL/kg) 400(3.5)   400(3.5)   Weight (kg) 114.1 114.1 114.1 114.1          Drains       None                   Physical Exam  Vitals reviewed.   HENT:      Head: Normocephalic and atraumatic.   Abdominal:      General: Abdomen is flat.      Palpations: Abdomen is soft.   Genitourinary:     Penis: Normal.       Comments: Wound inspected. Medial edge of incision with granulation tissue- no necrotic eschars  Testicle appropriately  tender  No odor  No additional crepitus or purulent discharge  Musculoskeletal:         General: No swelling or deformity.      Cervical back: Neck supple.   Skin:     General: Skin is warm.      Findings: No bruising.   Psychiatric:         Mood and Affect: Mood normal.         Judgment: Judgment normal.       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  Clinically stable    - Admitted to hospital medicine, appreciate assistance  - Debrided 11/15 and 11/17 in OR. Medial aspect of wound debrided at bedside 11/22.  - Voiding spontaneously  - Diet, insulin and pain control per primary    - Continue antibiotics per infectious disease recs. On cefepime/flagyl.  - Anaerobic culture with Anaerococcus murdochii and Prevotella bucchalis.  - Dressing changes BID/ RN staff. Wound care following.  - Rest of care per primary        VTE Risk Mitigation (From admission, onward)         Ordered     IP VTE HIGH RISK PATIENT  Once         11/15/22 1948     Reason for No Pharmacological VTE Prophylaxis  Once        Question:  Reasons:  Answer:  Risk of Bleeding    11/15/22 1948     Place sequential compression device  Until discontinued         11/15/22 1936                Jenny Panda MD  Urology  [facility] ([place])

## 2022-11-27 NOTE — PROGRESS NOTES
Nurse called and stated pt called for a breathing tx, upon arrival pt is sleeping comfortably with no distress noted, RT called for pt three times and pt remained sleeping, nurse aware.

## 2022-11-27 NOTE — SUBJECTIVE & OBJECTIVE
Interval History: no new complaints, voiding well    Review of Systems   All other systems reviewed and are negative.  Objective:     Temp:  [97.9 °F (36.6 °C)-98.4 °F (36.9 °C)] 98 °F (36.7 °C)  Pulse:  [] 88  Resp:  [16-20] 16  SpO2:  [90 %-96 %] 90 %  BP: (113-131)/(60-82) 113/73     Body mass index is 35.08 kg/m².    Date 11/27/22 0700 - 11/28/22 0659   Shift 3607-3724 1665-0535 5431-4681 24 Hour Total   INTAKE   Shift Total(mL/kg)       OUTPUT   Urine(mL/kg/hr) 400   400   Shift Total(mL/kg) 400(3.5)   400(3.5)   Weight (kg) 114.1 114.1 114.1 114.1          Drains       None                   Physical Exam  Vitals reviewed.   HENT:      Head: Normocephalic and atraumatic.   Abdominal:      General: Abdomen is flat.      Palpations: Abdomen is soft.   Genitourinary:     Penis: Normal.       Comments: Wound inspected. Medial edge of incision with granulation tissue- no necrotic eschars  Testicle appropriately tender  No odor  No additional crepitus or purulent discharge  Musculoskeletal:         General: No swelling or deformity.      Cervical back: Neck supple.   Skin:     General: Skin is warm.      Findings: No bruising.   Psychiatric:         Mood and Affect: Mood normal.         Judgment: Judgment normal.       Significant Labs:    BMP:  Recent Labs   Lab 11/25/22 0530 11/26/22 0418 11/27/22 0352    136 136   K 4.1 4.0 4.0   CL 98 94* 96   CO2 30* 35* 32*   BUN 5* 6* 7*   CREATININE 0.8 0.8 0.8   CALCIUM 8.6* 8.8 8.9       CBC:   Recent Labs   Lab 11/25/22 0530 11/26/22 0418 11/27/22 0352   WBC 7.55 8.58 9.17   HGB 14.9 14.6 14.5   HCT 45.2 44.2 43.8    327 325

## 2022-11-27 NOTE — CONSULTS
Thank you for your consult to [facility]. We have reviewed the patient chart. This patient does meet criteria for [facility] service at this time. Will assume care on 11/27/22 at 6AM

## 2022-11-27 NOTE — PROGRESS NOTES
[facility])  Cardiology  Progress Note    Patient Name: Nithin Wagner  MRN: [id number]  Admission Date: 11/15/2022  Hospital Length of Stay: 12 days  Code Status: Full Code   Attending Physician: Abelino Pollock MD   Primary Care Physician: Tushar Drew MD  Expected Discharge Date:   Principal Problem:Sepsis    Subjective:     Brief HPI:    Nithin Wagner is a 67 y.o.male with hypertension, hypercholesterolemia and diabetes mellitus type 2. He has coronary artery disease with intervention done of his right coronary artery in the past. In 10/2022 he noted an occasional high heart rate. He documented a narrow complex tachycardia on a rhythm strip. On 11/25/2022 he was admitted with Mary's. He has undergone several debridements. On 11/24/2022 he was noted to be tachycardic with a heart rate of 160 mg bpm. He received metoprolol iv and the SVT resolved. On 11/25/2022 he had several more prolonged spells of supraventricular tachycardia that self terminated.       Hospital Course:     11/25/2022: Metoprolol was begun.    Interval History:    Long run of SVT this morning that self terminated.    Overall slowly better.    Review of Systems   Constitutional: Positive for malaise/fatigue. Negative for chills and fever.   HENT:  Negative for nosebleeds.    Eyes:  Negative for vision loss in left eye and vision loss in right eye.   Cardiovascular:  Negative for chest pain, leg swelling, orthopnea, palpitations and paroxysmal nocturnal dyspnea.   Respiratory:  Negative for cough, hemoptysis, shortness of breath, sputum production and wheezing.    Hematologic/Lymphatic: Negative for bleeding problem. Does not bruise/bleed easily.   Skin:  Negative for color change and rash.   Musculoskeletal:  Negative for muscle weakness and myalgias.   Gastrointestinal:  Negative for abdominal pain, heartburn, hematemesis, hematochezia, melena, nausea and vomiting.   Genitourinary:         Pelvic pain.   Neurological:   Negative for dizziness, focal weakness, headaches, light-headedness, vertigo and weakness.   Psychiatric/Behavioral:  Negative for altered mental status. The patient is not nervous/anxious.    Allergic/Immunologic: Negative for persistent infections.   Objective:     Vital Signs (Most Recent):  Temp: 98 °F (36.7 °C) (11/27/22 1130)  Pulse: 79 (11/27/22 1200)  Resp: 16 (11/27/22 1130)  BP: 113/73 (11/27/22 1130)  SpO2: (!) 90 % (11/27/22 1130)   Vital Signs (24h Range):  Temp:  [97.9 °F (36.6 °C)-98.4 °F (36.9 °C)] 98 °F (36.7 °C)  Pulse:  [] 79  Resp:  [16-20] 16  SpO2:  [90 %-96 %] 90 %  BP: (113-131)/(60-82) 113/73     Weight: 114.1 kg (251 lb 8.7 oz)  Body mass index is 35.08 kg/m².    SpO2: (!) 90 %  O2 Device (Oxygen Therapy): room air      Intake/Output Summary (Last 24 hours) at 11/27/2022 1226  Last data filed at 11/27/2022 0131  Gross per 24 hour   Intake --   Output 3201 ml   Net -3201 ml         Lines/Drains/Airways       Peripheral Intravenous Line  Duration                  Peripheral IV - Single Lumen 11/25/22 1342 Anterior;Left Wrist 1 day                    Physical Exam  Constitutional:       General: He is not in acute distress.     Appearance: Normal appearance. He is well-developed. He is not ill-appearing.   Eyes:      Conjunctiva/sclera:      Right eye: Right conjunctiva is not injected. No hemorrhage.     Left eye: Left conjunctiva is not injected. No hemorrhage.     Pupils:      Right eye: Pupil is round.      Left eye: Pupil is round.   Neck:      Vascular: No JVD.   Cardiovascular:      Rate and Rhythm: Normal rate and regular rhythm.      Heart sounds: S1 normal and S2 normal.     Gallop present. S4 sounds present.   Pulmonary:      Effort: Pulmonary effort is normal.      Breath sounds: Normal breath sounds.   Chest:      Chest wall: No swelling or tenderness.   Abdominal:      General: There is no distension.      Palpations: Abdomen is soft.      Tenderness: There is no abdominal  tenderness.   Musculoskeletal:      Cervical back: Neck supple.      Right ankle: No swelling.      Left ankle: No swelling.   Skin:     General: Skin is warm and dry.      Findings: No rash.   Neurological:      Mental Status: He is alert and oriented to person, place, and time. He is not disoriented.   Psychiatric:         Attention and Perception: Attention normal.         Mood and Affect: Mood normal.         Speech: Speech normal.         Behavior: Behavior normal.         Thought Content: Thought content normal.         Cognition and Memory: Cognition and memory normal.         Judgment: Judgment normal.       Current Medications:     aspirin  81 mg Oral Daily    ceFEPime (MAXIPIME) IVPB  1 g Intravenous Q8H    digoxin  125 mcg Oral Daily    ezetimibe  10 mg Oral Daily    folic acid  1 mg Oral Daily    furosemide  40 mg Oral BID    insulin aspart U-100  8 Units Subcutaneous TIDWM    insulin detemir U-100  38 Units Subcutaneous Daily    losartan  100 mg Oral Daily    metoprolol tartrate  50 mg Oral Q12H    metroNIDAZOLE  500 mg Oral Q8H    multivitamin  1 tablet Oral Daily    nystatin  5 mL Oral QID (WM & HS)     Current Laboratory Results:    Recent Results (from the past 24 hour(s))   POCT Glucose, Hand-Held Device    Collection Time: 11/26/22  4:00 PM   Result Value Ref Range    POC Glucose 189 (A) 70 - 110 MG/DL   POCT glucose    Collection Time: 11/26/22  4:39 PM   Result Value Ref Range    POCT Glucose 177 (H) 70 - 110 mg/dL   POCT glucose    Collection Time: 11/26/22  8:43 PM   Result Value Ref Range    POCT Glucose 320 (H) 70 - 110 mg/dL   Magnesium    Collection Time: 11/27/22  3:52 AM   Result Value Ref Range    Magnesium 1.8 1.6 - 2.6 mg/dL   Phosphorus    Collection Time: 11/27/22  3:52 AM   Result Value Ref Range    Phosphorus 3.5 2.7 - 4.5 mg/dL   Basic Metabolic Panel    Collection Time: 11/27/22  3:52 AM   Result Value Ref Range    Sodium 136 136 - 145 mmol/L    Potassium 4.0 3.5 - 5.1 mmol/L     Chloride 96 95 - 110 mmol/L    CO2 32 (H) 23 - 29 mmol/L    Glucose 261 (H) 70 - 110 mg/dL    BUN 7 (L) 8 - 23 mg/dL    Creatinine 0.8 0.5 - 1.4 mg/dL    Calcium 8.9 8.7 - 10.5 mg/dL    Anion Gap 8 8 - 16 mmol/L    eGFR >60 >60 mL/min/1.73 m^2   CBC Auto Differential    Collection Time: 11/27/22  3:52 AM   Result Value Ref Range    WBC 9.17 3.90 - 12.70 K/uL    RBC 4.32 (L) 4.60 - 6.20 M/uL    Hemoglobin 14.5 14.0 - 18.0 g/dL    Hematocrit 43.8 40.0 - 54.0 %     (H) 82 - 98 fL    MCH 33.6 (H) 27.0 - 31.0 pg    MCHC 33.1 32.0 - 36.0 g/dL    RDW 12.6 11.5 - 14.5 %    Platelets 325 150 - 450 K/uL    MPV 11.3 9.2 - 12.9 fL    Immature Granulocytes 1.7 (H) 0.0 - 0.5 %    Gran # (ANC) 6.2 1.8 - 7.7 K/uL    Immature Grans (Abs) 0.16 (H) 0.00 - 0.04 K/uL    Lymph # 1.6 1.0 - 4.8 K/uL    Mono # 1.1 (H) 0.3 - 1.0 K/uL    Eos # 0.1 0.0 - 0.5 K/uL    Baso # 0.06 0.00 - 0.20 K/uL    nRBC 0 0 /100 WBC    Gran % 67.9 38.0 - 73.0 %    Lymph % 17.0 (L) 18.0 - 48.0 %    Mono % 11.7 4.0 - 15.0 %    Eosinophil % 1.0 0.0 - 8.0 %    Basophil % 0.7 0.0 - 1.9 %    Differential Method Automated    POCT glucose    Collection Time: 11/27/22  7:38 AM   Result Value Ref Range    POC Glucose 235 (A) 70 - 110 MG/DL     Current Imaging Results:    X-Ray Chest AP Portable   Final Result      Radiographic findings as above, continued follow-up recommended.         Electronically signed by: Luis Moore   Date:    11/23/2022   Time:    21:27      CT Abdomen Pelvis With Contrast   Final Result   Abnormal      1. Extensive left-sided scrotal air with diffuse scrotal wall thickening, edema, and inflammatory changes seen which extend into the left perineal/inguinal region.  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   2. Additional findings as detailed above   This report was flagged in Epic as abnormal.         Electronically signed by: Latanya Liz MD   Date:    11/15/2022   Time:    19:47      X-Ray Chest AP Portable   Final  Result      New region of consolidation in the right mid lung zone which is most suggestive for pneumonia given short-term interval development.  Possible small right pleural effusion.  Future radiographic follow-up is recommended to ensure resolution.         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 Chronic systolic heart failure  Compensated.      2. Atherosclerosis of native coronary artery of native heart without angina pectoris  Stable and free of angina after right coronary intervention     3. Supraventricular Tachycardia              10/2022: Diagnosed.   On digoxin 0.125 mg Q24.              11/25/2022: Recurrent long runs.   11/25/2022: Metoprolol was begun.   11/26/2022: Metoprolol 25 mg Q6 was changed to metoprolol 50 mg Q12.   11/27/2022: About 20 min run of SVT.              On metoprolol 50 mg Q12 and digoxin 0.125 mg Q24.   Follow for now.      4. Atherosclerotic peripheral vascular disease  Asymptomatic     5. Essential hypertension  Controlled     6. Type 2 diabetes mellitus with other circulatory complication, with long-term current use of insulin  Followed by  endocrinology     7. Severe obesity  Unchanged     8. Chronic obstructive pulmonary disease, unspecified COPD type  Seems to be a baseline.     9. Mary's              11/15/2022: Presented.              Several debridements.              On antibiotics iv.      VTE Risk Mitigation (From admission, onward)           Ordered     IP VTE HIGH RISK PATIENT  Once         11/15/22 1948     Reason for No Pharmacological VTE Prophylaxis  Once        Question:  Reasons:  Answer:  Risk of Bleeding    11/15/22 1948     Place sequential compression device  Until discontinued         11/15/22 1936                    Laila Norton MD  Cardiology  [facility] ([place])

## 2022-11-28 ENCOUNTER — PATIENT OUTREACH (OUTPATIENT)
Dept: ADMINISTRATIVE | Facility: OTHER | Age: 67
End: 2022-11-28
Payer: MEDICARE

## 2022-11-28 DIAGNOSIS — I50.22 CHRONIC SYSTOLIC HEART FAILURE: ICD-10-CM

## 2022-11-28 LAB
ANION GAP SERPL CALC-SCNC: 7 MMOL/L (ref 8–16)
BACTERIA BLD CULT: NORMAL
BASOPHILS # BLD AUTO: 0.1 K/UL (ref 0–0.2)
BASOPHILS NFR BLD: 0.9 % (ref 0–1.9)
BUN SERPL-MCNC: 9 MG/DL (ref 8–23)
CALCIUM SERPL-MCNC: 9.3 MG/DL (ref 8.7–10.5)
CHLORIDE SERPL-SCNC: 96 MMOL/L (ref 95–110)
CO2 SERPL-SCNC: 34 MMOL/L (ref 23–29)
CREAT SERPL-MCNC: 1 MG/DL (ref 0.5–1.4)
DIFFERENTIAL METHOD: ABNORMAL
EOSINOPHIL # BLD AUTO: 0.1 K/UL (ref 0–0.5)
EOSINOPHIL NFR BLD: 1.1 % (ref 0–8)
ERYTHROCYTE [DISTWIDTH] IN BLOOD BY AUTOMATED COUNT: 12.5 % (ref 11.5–14.5)
EST. GFR  (NO RACE VARIABLE): >60 ML/MIN/1.73 M^2
GLUCOSE SERPL-MCNC: 223 MG/DL (ref 70–110)
HCT VFR BLD AUTO: 46.9 % (ref 40–54)
HGB BLD-MCNC: 15.4 G/DL (ref 14–18)
IMM GRANULOCYTES # BLD AUTO: 0.14 K/UL (ref 0–0.04)
IMM GRANULOCYTES NFR BLD AUTO: 1.3 % (ref 0–0.5)
LYMPHOCYTES # BLD AUTO: 2.6 K/UL (ref 1–4.8)
LYMPHOCYTES NFR BLD: 23.9 % (ref 18–48)
MAGNESIUM SERPL-MCNC: 1.9 MG/DL (ref 1.6–2.6)
MCH RBC QN AUTO: 33.3 PG (ref 27–31)
MCHC RBC AUTO-ENTMCNC: 32.8 G/DL (ref 32–36)
MCV RBC AUTO: 102 FL (ref 82–98)
MONOCYTES # BLD AUTO: 1.2 K/UL (ref 0.3–1)
MONOCYTES NFR BLD: 10.8 % (ref 4–15)
NEUTROPHILS # BLD AUTO: 6.7 K/UL (ref 1.8–7.7)
NEUTROPHILS NFR BLD: 62 % (ref 38–73)
NRBC BLD-RTO: 0 /100 WBC
PHOSPHATE SERPL-MCNC: 3.5 MG/DL (ref 2.7–4.5)
PLATELET # BLD AUTO: 376 K/UL (ref 150–450)
PMV BLD AUTO: 11.7 FL (ref 9.2–12.9)
POTASSIUM SERPL-SCNC: 4.4 MMOL/L (ref 3.5–5.1)
RBC # BLD AUTO: 4.62 M/UL (ref 4.6–6.2)
SODIUM SERPL-SCNC: 137 MMOL/L (ref 136–145)
WBC # BLD AUTO: 10.75 K/UL (ref 3.9–12.7)

## 2022-11-28 PROCEDURE — 25000003 PHARM REV CODE 250: Performed by: NURSE PRACTITIONER

## 2022-11-28 PROCEDURE — 99233 SBSQ HOSP IP/OBS HIGH 50: CPT | Mod: ,,, | Performed by: INTERNAL MEDICINE

## 2022-11-28 PROCEDURE — 84100 ASSAY OF PHOSPHORUS: CPT | Performed by: HOSPITALIST

## 2022-11-28 PROCEDURE — 25000003 PHARM REV CODE 250: Performed by: UROLOGY

## 2022-11-28 PROCEDURE — 83735 ASSAY OF MAGNESIUM: CPT | Performed by: HOSPITALIST

## 2022-11-28 PROCEDURE — 63600175 PHARM REV CODE 636 W HCPCS: Performed by: HOSPITALIST

## 2022-11-28 PROCEDURE — 99232 SBSQ HOSP IP/OBS MODERATE 35: CPT | Mod: GC,,, | Performed by: UROLOGY

## 2022-11-28 PROCEDURE — 25000003 PHARM REV CODE 250: Performed by: STUDENT IN AN ORGANIZED HEALTH CARE EDUCATION/TRAINING PROGRAM

## 2022-11-28 PROCEDURE — 80048 BASIC METABOLIC PNL TOTAL CA: CPT | Performed by: HOSPITALIST

## 2022-11-28 PROCEDURE — 27000221 HC OXYGEN, UP TO 24 HOURS

## 2022-11-28 PROCEDURE — 25000003 PHARM REV CODE 250: Performed by: HOSPITALIST

## 2022-11-28 PROCEDURE — 93010 ELECTROCARDIOGRAM REPORT: CPT | Mod: ,,, | Performed by: INTERNAL MEDICINE

## 2022-11-28 PROCEDURE — 99232 PR SUBSEQUENT HOSPITAL CARE,LEVL II: ICD-10-PCS | Mod: GC,,, | Performed by: UROLOGY

## 2022-11-28 PROCEDURE — 99900035 HC TECH TIME PER 15 MIN (STAT)

## 2022-11-28 PROCEDURE — 36415 COLL VENOUS BLD VENIPUNCTURE: CPT | Performed by: HOSPITALIST

## 2022-11-28 PROCEDURE — 25000003 PHARM REV CODE 250: Performed by: INTERNAL MEDICINE

## 2022-11-28 PROCEDURE — 63600175 PHARM REV CODE 636 W HCPCS: Performed by: INTERNAL MEDICINE

## 2022-11-28 PROCEDURE — 93010 EKG 12-LEAD: ICD-10-PCS | Mod: ,,, | Performed by: INTERNAL MEDICINE

## 2022-11-28 PROCEDURE — 99233 PR SUBSEQUENT HOSPITAL CARE,LEVL III: ICD-10-PCS | Mod: 95,,, | Performed by: STUDENT IN AN ORGANIZED HEALTH CARE EDUCATION/TRAINING PROGRAM

## 2022-11-28 PROCEDURE — 99233 SBSQ HOSP IP/OBS HIGH 50: CPT | Mod: 95,,, | Performed by: STUDENT IN AN ORGANIZED HEALTH CARE EDUCATION/TRAINING PROGRAM

## 2022-11-28 PROCEDURE — 21400001 HC TELEMETRY ROOM

## 2022-11-28 PROCEDURE — 97116 GAIT TRAINING THERAPY: CPT | Mod: CQ

## 2022-11-28 PROCEDURE — 99233 PR SUBSEQUENT HOSPITAL CARE,LEVL III: ICD-10-PCS | Mod: ,,, | Performed by: INTERNAL MEDICINE

## 2022-11-28 PROCEDURE — 85025 COMPLETE CBC W/AUTO DIFF WBC: CPT | Performed by: HOSPITALIST

## 2022-11-28 PROCEDURE — 94640 AIRWAY INHALATION TREATMENT: CPT

## 2022-11-28 PROCEDURE — 97530 THERAPEUTIC ACTIVITIES: CPT | Mod: CQ

## 2022-11-28 PROCEDURE — 25000242 PHARM REV CODE 250 ALT 637 W/ HCPCS: Performed by: UROLOGY

## 2022-11-28 PROCEDURE — 93005 ELECTROCARDIOGRAM TRACING: CPT

## 2022-11-28 PROCEDURE — 94761 N-INVAS EAR/PLS OXIMETRY MLT: CPT

## 2022-11-28 PROCEDURE — 63600175 PHARM REV CODE 636 W HCPCS: Performed by: NURSE PRACTITIONER

## 2022-11-28 RX ORDER — LOSARTAN POTASSIUM 25 MG/1
25 TABLET ORAL EVERY 12 HOURS
Status: DISCONTINUED | OUTPATIENT
Start: 2022-11-28 | End: 2022-12-01

## 2022-11-28 RX ORDER — SODIUM CHLORIDE 9 MG/ML
INJECTION, SOLUTION INTRAVENOUS
Status: DISCONTINUED | OUTPATIENT
Start: 2022-11-28 | End: 2022-12-01 | Stop reason: HOSPADM

## 2022-11-28 RX ORDER — FUROSEMIDE 40 MG/1
40 TABLET ORAL DAILY
Status: DISCONTINUED | OUTPATIENT
Start: 2022-11-29 | End: 2022-11-29

## 2022-11-28 RX ORDER — DILTIAZEM HYDROCHLORIDE 120 MG/1
120 CAPSULE, COATED, EXTENDED RELEASE ORAL EVERY 12 HOURS
Status: DISCONTINUED | OUTPATIENT
Start: 2022-11-28 | End: 2022-12-01 | Stop reason: HOSPADM

## 2022-11-28 RX ADMIN — IPRATROPIUM BROMIDE AND ALBUTEROL SULFATE 3 ML: 2.5; .5 SOLUTION RESPIRATORY (INHALATION) at 07:11

## 2022-11-28 RX ADMIN — LOSARTAN POTASSIUM 25 MG: 25 TABLET, FILM COATED ORAL at 08:11

## 2022-11-28 RX ADMIN — DIGOXIN 125 MCG: 125 TABLET ORAL at 11:11

## 2022-11-28 RX ADMIN — CEFEPIME HYDROCHLORIDE 1 G: 1 INJECTION, SOLUTION INTRAVENOUS at 05:11

## 2022-11-28 RX ADMIN — LOSARTAN POTASSIUM 100 MG: 50 TABLET, FILM COATED ORAL at 11:11

## 2022-11-28 RX ADMIN — FUROSEMIDE 40 MG: 40 TABLET ORAL at 11:11

## 2022-11-28 RX ADMIN — METOPROLOL TARTRATE 50 MG: 50 TABLET, FILM COATED ORAL at 08:11

## 2022-11-28 RX ADMIN — NYSTATIN 500000 UNITS: 500000 SUSPENSION ORAL at 11:11

## 2022-11-28 RX ADMIN — OXYCODONE 15 MG: 5 TABLET ORAL at 08:11

## 2022-11-28 RX ADMIN — ONDANSETRON 4 MG: 2 INJECTION INTRAMUSCULAR; INTRAVENOUS at 11:11

## 2022-11-28 RX ADMIN — INSULIN ASPART 6 UNITS: 100 INJECTION, SOLUTION INTRAVENOUS; SUBCUTANEOUS at 11:11

## 2022-11-28 RX ADMIN — OXYCODONE 15 MG: 5 TABLET ORAL at 06:11

## 2022-11-28 RX ADMIN — METRONIDAZOLE 500 MG: 500 TABLET ORAL at 08:11

## 2022-11-28 RX ADMIN — INSULIN ASPART 6 UNITS: 100 INJECTION, SOLUTION INTRAVENOUS; SUBCUTANEOUS at 05:11

## 2022-11-28 RX ADMIN — CEFEPIME HYDROCHLORIDE 1 G: 1 INJECTION, SOLUTION INTRAVENOUS at 02:11

## 2022-11-28 RX ADMIN — ACETAMINOPHEN 650 MG: 325 TABLET, FILM COATED ORAL at 02:11

## 2022-11-28 RX ADMIN — INSULIN ASPART 8 UNITS: 100 INJECTION, SOLUTION INTRAVENOUS; SUBCUTANEOUS at 11:11

## 2022-11-28 RX ADMIN — INSULIN ASPART 2 UNITS: 100 INJECTION, SOLUTION INTRAVENOUS; SUBCUTANEOUS at 08:11

## 2022-11-28 RX ADMIN — MORPHINE SULFATE 6 MG: 2 INJECTION, SOLUTION INTRAMUSCULAR; INTRAVENOUS at 10:11

## 2022-11-28 RX ADMIN — ONDANSETRON 4 MG: 2 INJECTION INTRAMUSCULAR; INTRAVENOUS at 02:11

## 2022-11-28 RX ADMIN — SODIUM CHLORIDE: 0.9 INJECTION, SOLUTION INTRAVENOUS at 11:11

## 2022-11-28 RX ADMIN — NYSTATIN 500000 UNITS: 500000 SUSPENSION ORAL at 08:11

## 2022-11-28 RX ADMIN — DILTIAZEM HYDROCHLORIDE 120 MG: 120 CAPSULE, COATED, EXTENDED RELEASE ORAL at 08:11

## 2022-11-28 RX ADMIN — MORPHINE SULFATE 6 MG: 2 INJECTION, SOLUTION INTRAMUSCULAR; INTRAVENOUS at 02:11

## 2022-11-28 RX ADMIN — EZETIMIBE 10 MG: 10 TABLET ORAL at 11:11

## 2022-11-28 RX ADMIN — NYSTATIN 500000 UNITS: 500000 SUSPENSION ORAL at 05:11

## 2022-11-28 RX ADMIN — INSULIN ASPART 8 UNITS: 100 INJECTION, SOLUTION INTRAVENOUS; SUBCUTANEOUS at 05:11

## 2022-11-28 RX ADMIN — THERA TABS 1 TABLET: TAB at 11:11

## 2022-11-28 RX ADMIN — METRONIDAZOLE 500 MG: 500 TABLET ORAL at 06:11

## 2022-11-28 RX ADMIN — FOLIC ACID 1 MG: 1 TABLET ORAL at 11:11

## 2022-11-28 RX ADMIN — CEFEPIME HYDROCHLORIDE 1 G: 1 INJECTION, SOLUTION INTRAVENOUS at 11:11

## 2022-11-28 RX ADMIN — OXYCODONE 15 MG: 5 TABLET ORAL at 03:11

## 2022-11-28 RX ADMIN — METOPROLOL TARTRATE 50 MG: 50 TABLET, FILM COATED ORAL at 11:11

## 2022-11-28 RX ADMIN — METRONIDAZOLE 500 MG: 500 TABLET ORAL at 03:11

## 2022-11-28 RX ADMIN — SODIUM CHLORIDE: 0.9 INJECTION, SOLUTION INTRAVENOUS at 05:11

## 2022-11-28 RX ADMIN — ASPIRIN 81 MG CHEWABLE TABLET 81 MG: 81 TABLET CHEWABLE at 11:11

## 2022-11-28 RX ADMIN — MORPHINE SULFATE 6 MG: 2 INJECTION, SOLUTION INTRAMUSCULAR; INTRAVENOUS at 11:11

## 2022-11-28 RX ADMIN — INSULIN DETEMIR 38 UNITS: 100 INJECTION, SOLUTION SUBCUTANEOUS at 11:11

## 2022-11-28 RX ADMIN — MORPHINE SULFATE 6 MG: 2 INJECTION, SOLUTION INTRAMUSCULAR; INTRAVENOUS at 05:11

## 2022-11-28 NOTE — PROGRESS NOTES
[facility]    Admission date:  11/15/2022     Assessment    Chronic systolic heart failure   Compensated     Coronary atherosclerosis   Stable     Supraventricular tachycardia   Continues to have self-limited runs with only mild elevation in his heart rate.  Probably exacerbated by his acute infectious illness.  Will adjust AV shaylee blocking agents.    Hypertension   Adequate control    Plan and Discussion    Add diltiazem to help with control of his SVT.  Reduce losartan so there is room with his blood pressure to add diltiazem.  Reduce Lasix to once daily.  Discussed that he needs to work with therapy and that it is safe to do so.    Subjective    Lying comfortably supine.  Pain controlled.  Feels fatigued after having SVT.    Medications  Current Facility-Administered Medications   Medication Dose Route Frequency Provider Last Rate Last Admin    0.9%  NaCl infusion   Intravenous PRN Abelino Pollock MD 10 mL/hr at 11/28/22 1104 New Bag at 11/28/22 1104    acetaminophen tablet 650 mg  650 mg Oral Q4H PRN Roger Davis NP   650 mg at 11/28/22 0203    albuterol-ipratropium 2.5 mg-0.5 mg/3 mL nebulizer solution 3 mL  3 mL Nebulization Q4H PRN William Hatch MD   3 mL at 11/27/22 2007    aspirin chewable tablet 81 mg  81 mg Oral Daily William Hatch MD   81 mg at 11/28/22 1105    benzonatate capsule 100 mg  100 mg Oral TID PRN Roger Davis NP        cefepime in dextrose 5 % 1 gram/50 mL IVPB 1 g  1 g Intravenous Q8H CARISA Lozano  mL/hr at 11/28/22 1105 1 g at 11/28/22 1105    dextrose 10% bolus 125 mL  12.5 g Intravenous PRN William Hatch MD   Stopped at 11/17/22 0925    dextrose 10% bolus 125 mL  12.5 g Intravenous PRN Roger Davis NP        dextrose 10% bolus 250 mL  25 g Intravenous PRN William Hatch MD        dextrose 10% bolus 250 mL  25 g Intravenous PRN Roger Davis NP        digoxin tablet 125 mcg  125 mcg Oral Daily William Hatch,  MD   125 mcg at 11/28/22 1105    ezetimibe tablet 10 mg  10 mg Oral Daily William Hatch MD   10 mg at 11/28/22 1105    folic acid tablet 1 mg  1 mg Oral Daily William Hatch MD   1 mg at 11/28/22 1113    furosemide tablet 40 mg  40 mg Oral BID Darby Obrien MD   40 mg at 11/28/22 1121    glucagon (human recombinant) injection 1 mg  1 mg Intramuscular PRN Roger Davis NP        glucose chewable tablet 16 g  16 g Oral PRN Roger Davis NP        glucose chewable tablet 24 g  24 g Oral PRN Roger Davis NP        insulin aspart U-100 pen 1-10 Units  1-10 Units Subcutaneous QID (AC + HS) PRN Roger Davis NP   6 Units at 11/28/22 1124    insulin aspart U-100 pen 8 Units  8 Units Subcutaneous TIDWM Darby Obrien MD   8 Units at 11/28/22 1107    insulin detemir U-100 pen 38 Units  38 Units Subcutaneous Daily Darby Obrien MD   38 Units at 11/28/22 1106    losartan tablet 100 mg  100 mg Oral Daily Darby Obrien MD   100 mg at 11/28/22 1105    metoprolol injection 5 mg  5 mg Intravenous Q4H PRN Laila Norton MD   5 mg at 11/27/22 0413    metoprolol tartrate (LOPRESSOR) tablet 50 mg  50 mg Oral Q12H Laila Norton MD   50 mg at 11/28/22 1105    metroNIDAZOLE tablet 500 mg  500 mg Oral Q8H Raj Christianson MD   500 mg at 11/28/22 0646    morphine injection 6 mg  6 mg Intravenous Q4H PRN Darby Obrien MD   6 mg at 11/28/22 1113    multivitamin tablet  1 tablet Oral Daily William Hatch MD   1 tablet at 11/28/22 1105    naloxone 0.4 mg/mL injection 0.02 mg  0.02 mg Intravenous PRN Roger Davis NP        nystatin 100,000 unit/mL suspension 500,000 Units  5 mL Oral QID (WM & HS) Darby Obrien MD   500,000 Units at 11/28/22 1105    ondansetron injection 4 mg  4 mg Intravenous Q8H PRN Roger Davis NP   4 mg at 11/28/22 1122    oxyCODONE immediate release tablet 15 mg  15 mg Oral Q3H PRN Darby Obrien MD   15 mg at 11/28/22 0646    oxyCODONE-acetaminophen 5-325 mg per  tablet 1 tablet  1 tablet Oral Q4H PRN William Hatch MD   1 tablet at 11/27/22 1044    sodium chloride 0.9% flush 10 mL  10 mL Intravenous PRN William Hatch MD            Physical Exam    Temp:  [97.5 °F (36.4 °C)-98.7 °F (37.1 °C)]   Pulse:  []   Resp:  [16-18]   BP: (107-118)/(57-75)   SpO2:  [90 %-95 %]    Wt Readings from Last 3 Encounters:   11/25/22 114.1 kg (251 lb 8.7 oz)   11/14/22 110 kg (242 lb 9.6 oz)   11/02/22 111.4 kg (245 lb 11.2 oz)     Physical Exam  Constitutional:       General: He is not in acute distress.  Neck:      Vascular: No hepatojugular reflux or JVD.   Cardiovascular:      Rate and Rhythm: Normal rate and regular rhythm.      Heart sounds: S1 normal and S2 normal. No murmur heard.    No S3 or S4 sounds.   Pulmonary:      Effort: Pulmonary effort is normal.      Breath sounds: Normal breath sounds and air entry.   Abdominal:      General: Bowel sounds are normal.      Palpations: Abdomen is soft. There is no hepatomegaly.      Tenderness: There is no abdominal tenderness.   Musculoskeletal:      Right lower leg: No edema.      Left lower leg: No edema.   Skin:     Coloration: Skin is not pale.   Neurological:      Mental Status: He is alert.   Psychiatric:         Behavior: Behavior is cooperative.       Telemetry  Sinus rhythm with self-limited SVT    Labs  Recent Results (from the past 24 hour(s))   POCT glucose    Collection Time: 11/27/22  5:30 PM   Result Value Ref Range    POC Glucose 327 (A) 70 - 110 MG/DL   Magnesium    Collection Time: 11/28/22  4:40 AM   Result Value Ref Range    Magnesium 1.9 1.6 - 2.6 mg/dL   Phosphorus    Collection Time: 11/28/22  4:40 AM   Result Value Ref Range    Phosphorus 3.5 2.7 - 4.5 mg/dL   Basic Metabolic Panel    Collection Time: 11/28/22  4:40 AM   Result Value Ref Range    Sodium 137 136 - 145 mmol/L    Potassium 4.4 3.5 - 5.1 mmol/L    Chloride 96 95 - 110 mmol/L    CO2 34 (H) 23 - 29 mmol/L    Glucose 223 (H) 70 - 110 mg/dL     BUN 9 8 - 23 mg/dL    Creatinine 1.0 0.5 - 1.4 mg/dL    Calcium 9.3 8.7 - 10.5 mg/dL    Anion Gap 7 (L) 8 - 16 mmol/L    eGFR >60 >60 mL/min/1.73 m^2   CBC Auto Differential    Collection Time: 11/28/22  4:40 AM   Result Value Ref Range    WBC 10.75 3.90 - 12.70 K/uL    RBC 4.62 4.60 - 6.20 M/uL    Hemoglobin 15.4 14.0 - 18.0 g/dL    Hematocrit 46.9 40.0 - 54.0 %     (H) 82 - 98 fL    MCH 33.3 (H) 27.0 - 31.0 pg    MCHC 32.8 32.0 - 36.0 g/dL    RDW 12.5 11.5 - 14.5 %    Platelets 376 150 - 450 K/uL    MPV 11.7 9.2 - 12.9 fL    Immature Granulocytes 1.3 (H) 0.0 - 0.5 %    Gran # (ANC) 6.7 1.8 - 7.7 K/uL    Immature Grans (Abs) 0.14 (H) 0.00 - 0.04 K/uL    Lymph # 2.6 1.0 - 4.8 K/uL    Mono # 1.2 (H) 0.3 - 1.0 K/uL    Eos # 0.1 0.0 - 0.5 K/uL    Baso # 0.10 0.00 - 0.20 K/uL    nRBC 0 0 /100 WBC    Gran % 62.0 38.0 - 73.0 %    Lymph % 23.9 18.0 - 48.0 %    Mono % 10.8 4.0 - 15.0 %    Eosinophil % 1.1 0.0 - 8.0 %    Basophil % 0.9 0.0 - 1.9 %    Differential Method Automated              Shemar Dempsey MD

## 2022-11-28 NOTE — PLAN OF CARE
Problem: Physical Therapy  Goal: Physical Therapy Goal  Description: Goals to be met by: 22    Patient will increase functional independence with mobility by performin. Supine<>sit with SPV and 1 UE if needed. -MET   2. Sit<>stand with LRAD with SPV.-MET   3. Gait x 100 feet with LRAD with SBA.-MET   4. Ascend/descend 10 step(s) with least restrictive assistive device and SBA.   Outcome: Ongoing, Progressing   Sup to sit mod I, sit to stand mod I, amb'd 180' w/ SBA on RA, O2:88-90%, HR:92. Recommend home

## 2022-11-28 NOTE — PROGRESS NOTES
[facility] Medicine  Telemedicine Progress Note    Patient Name: Nithin Wagner  MRN: [id number]  Patient Class: IP- Inpatient   Admission Date: 11/15/2022  Length of Stay: 12 days  Attending Physician: Abelino Pollock MD  Primary Care Provider: Tushar Drew MD          Subjective:     Principal Problem:Sepsis        HPI:  The patient is a 67M with a past medical history of DM2, CAD, HTN, and HLD who presents with scrotal pain and swelling. Started about 4 weeks ago and has been progressively worsening despite a couple courses of antibiotics (unsure which ones). He reports fevers and chills at home, nausea, decreased PO intake, and multiple syncopal episodes with +incontinence of stool. Unable to urinate for the past 24 hours or so. Sugars 190s at home. He was seen yesterday in urgent care and referred to the ER, but says he wasn't able to make it at that time so he comes today instead. On initial workup, the patient has CT evidence of Fourniers gangrene.  He will be brought to the operating room emergently and admitted after to ICU for further management.      Overview/Hospital Course:  Mr. Wagner presented with scrotal pain and swelling.  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  Empiric initiated with cefepime, clindamycin, metronidazole, and vancomycin.  Urology consulted and he underwent emergent excision and debridement of necrotic tissue on 11/15. Repeat washout performed 11/17. Improved and stepped down from ICU 11/18.  Bedside debridement performed on 11/22.  ID consulted and antibiotics regimen changed to cefepime and Flagyl known.  Wound cultures with Anaerococcus and Prevotella species; awaiting sensitivities.  Renal function normalized.  Return of SVT and Cardiology consulted with start of metoprolol.  Awaiting wound culture sensitivities.      Interval History: Noted SVT - cardiology aware, continue metoprolol and digoxin. Urology following, voiding well.  Remains on IV abx per ID recs. PT ordered as patient feels very weak.     Review of Systems   Constitutional:  Negative for chills and fever.   Respiratory:  Negative for cough and shortness of breath.    Cardiovascular:  Negative for chest pain.   Genitourinary:  Positive for scrotal swelling.   Objective:     Vital Signs (Most Recent):  Temp: 98.7 °F (37.1 °C) (11/27/22 2006)  Pulse: 86 (11/27/22 2201)  Resp: 18 (11/27/22 2329)  BP: 116/71 (11/27/22 2006)  SpO2: (!) 94 % (11/27/22 2006)   Vital Signs (24h Range):  Temp:  [98 °F (36.7 °C)-98.7 °F (37.1 °C)] 98.7 °F (37.1 °C)  Pulse:  [] 86  Resp:  [16-19] 18  SpO2:  [90 %-95 %] 94 %  BP: (113-131)/(71-73) 116/71     Weight: 114.1 kg (251 lb 8.7 oz)  Body mass index is 35.08 kg/m².    Intake/Output Summary (Last 24 hours) at 11/27/2022 2330  Last data filed at 11/27/2022 2206  Gross per 24 hour   Intake --   Output 1230 ml   Net -1230 ml        Physical Exam  Constitutional:       General: He is sleeping.      Appearance: He is well-developed. He is obese.   HENT:      Head: Normocephalic and atraumatic.      Nose: Nose normal.   Eyes:      Extraocular Movements: Extraocular movements intact.      Conjunctiva/sclera: Conjunctivae normal.   Cardiovascular:      Rate and Rhythm: Normal rate and regular rhythm.      Pulses:           Radial pulses are 2+ on the right side and 2+ on the left side.      Heart sounds: Normal heart sounds.   Pulmonary:      Effort: Pulmonary effort is normal.      Breath sounds: Normal breath sounds.   Abdominal:      General: Bowel sounds are normal. There is no distension.      Palpations: Abdomen is soft.      Tenderness: There is no abdominal tenderness. There is no guarding or rebound.   Musculoskeletal:         General: Normal range of motion.      Cervical back: Normal range of motion and neck supple.   Skin:     General: Skin is warm and dry.      Comments: Packing noted to left scrotum and an area of dark spot on right  scrotum more like scab   Neurological:      Mental Status: He is oriented to person, place, and time.      Motor: Motor function is intact.   Psychiatric:         Mood and Affect: Mood normal.         Behavior: Behavior normal.         Thought Content: Thought content normal.       Significant Labs: All pertinent labs within the past 24 hours have been reviewed.    Significant Imaging: I have reviewed all pertinent imaging results/findings within the past 24 hours.      Assessment/Plan:      * Sepsis  Mary's Gangrene of Scrotum  Leukocytosis  Lactic acidosis  Elevated troponin  - Patient criteria for sepsis with infective focus of Mary gangrene of the scrotum  - Lactic acid level 4.4, procalcitonin >48, WBC 24, and elevated troponin levels that trended down  - Urology consulted and patient underwent excision and debridement of necrotic scrotal tissue on 11/15; repeat debridement 11/17  - Patient was on cefepime 1g IV q.12, clindamycin 900 mg IV Q 8, metronidazole 500 mg IV Q 8 and vancomycin dosed by pharmacy  - Elevated troponins likely secondary to sepsis and setting of DONNA given no reported chest pain and no acute EKG changes  - Blood cultures no growth to date and wound cultures with Anaerococcus and Prevotella species  - Wound care ordered and Urology following  - Clinically was improving with resolution of lactic acidosis   - Adjust pain medication regimen with addition of medication for breakthrough pain  - ID consult appreciated, and clindamycin and vancomycin stopped, and patient continued cefepime 1g IV q.12 and metronidazole 500 mg IV Q 8 on  11/21  - Ricketts removed on 11/21  - Urology performed beside debridement on 11/22  - Clinically worse on 11/23; repeat blood cultures NGTD, UA negative and CXR with edema  - Stopped IV fluids and restarted lasix  - Clinically improving, follow up studies and await sensitivities for final recommendation for antibiotic regimen by ID along with clearance from  "Urology that the patient is ready for discharge  - Patient will need home health     SVT (supraventricular tachycardia)  - Noted runs of SVT on monitor  - Recently diagnosed and treated with digoxin   - Return overnight of SVT   - Reconsult Cardiology, metoprolol added on 11/25  - No further episodes SVT, metoprolol change to 50 mg p.o. b.i.d. by Cardiology today    Chronic diastolic heart failure  CAD  - Last echo with normal EF and grade 1 diastolic dysfunction on 10/28/2022  - Elevated troponin due to sepsis with associated hypotension along with DONNA and CHF which were trended down and no ectopy noted on telemetry, and no acute EKG changes  - Continue digoxin 125 mcg p.o. daily, ASA 81 mg p.o. daily and ezetimibe 10 mg p.o. 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  - Glucoses levels reviewed, not at targets   - Increase detemir to 38U subQ daily, and moderate-dose sliding scale insulin aspart 1-10U subQ TIDWM PRN, add Novolog 8U QAC  - Will adjust insulin regimen as needed to achieve glucose between 140 - 180    Essential hypertension  - Hypotensive was on admit that responded to treatment with IV fluids with no need for pressor support  - Blood pressure predominantly in normal range; monitor.  - " Restarted BP medication, substitute with losartan 100 mg daily  - Cardiology added metoprolol      VTE Risk Mitigation (From admission, onward)         Ordered     IP VTE HIGH RISK PATIENT  Once         11/15/22 1948     Reason for No Pharmacological VTE Prophylaxis  Once        Question:  Reasons:  Answer:  Risk of Bleeding    11/15/22 1948     Place sequential compression device  Until discontinued         11/15/22 1936                      I have completed this tele-visit with the assistance of a telepresenter.    The attending portion of this evaluation, treatment, and documentation was performed per Abelino Pollock MD via Telemedicine AudioVisual using the secure VisiQuate software platform with 2 way audio/video. The provider was located off-site and the patient is located in the hospital. The aforementioned video software was utilized to document the relevant history and physical exam    Abelino Pollock MD  Department of Hospital Medicine   [facility] - Med Surg ([place])

## 2022-11-28 NOTE — SUBJECTIVE & OBJECTIVE
Interval History: Noted SVT - cardiology aware, continue metoprolol and digoxin. Urology following, voiding well. Remains on IV abx per ID recs. PT ordered as patient feels very weak.     Review of Systems   Constitutional:  Negative for chills and fever.   Respiratory:  Negative for cough and shortness of breath.    Cardiovascular:  Negative for chest pain.   Genitourinary:  Positive for scrotal swelling.   Objective:     Vital Signs (Most Recent):  Temp: 98.7 °F (37.1 °C) (11/27/22 2006)  Pulse: 86 (11/27/22 2201)  Resp: 18 (11/27/22 2329)  BP: 116/71 (11/27/22 2006)  SpO2: (!) 94 % (11/27/22 2006)   Vital Signs (24h Range):  Temp:  [98 °F (36.7 °C)-98.7 °F (37.1 °C)] 98.7 °F (37.1 °C)  Pulse:  [] 86  Resp:  [16-19] 18  SpO2:  [90 %-95 %] 94 %  BP: (113-131)/(71-73) 116/71     Weight: 114.1 kg (251 lb 8.7 oz)  Body mass index is 35.08 kg/m².    Intake/Output Summary (Last 24 hours) at 11/27/2022 2330  Last data filed at 11/27/2022 2206  Gross per 24 hour   Intake --   Output 1230 ml   Net -1230 ml        Physical Exam  Constitutional:       General: He is sleeping.      Appearance: He is well-developed. He is obese.   HENT:      Head: Normocephalic and atraumatic.      Nose: Nose normal.   Eyes:      Extraocular Movements: Extraocular movements intact.      Conjunctiva/sclera: Conjunctivae normal.   Cardiovascular:      Rate and Rhythm: Normal rate and regular rhythm.      Pulses:           Radial pulses are 2+ on the right side and 2+ on the left side.      Heart sounds: Normal heart sounds.   Pulmonary:      Effort: Pulmonary effort is normal.      Breath sounds: Normal breath sounds.   Abdominal:      General: Bowel sounds are normal. There is no distension.      Palpations: Abdomen is soft.      Tenderness: There is no abdominal tenderness. There is no guarding or rebound.   Musculoskeletal:         General: Normal range of motion.      Cervical back: Normal range of motion and neck supple.   Skin:      General: Skin is warm and dry.      Comments: Packing noted to left scrotum and an area of dark spot on right scrotum more like scab   Neurological:      Mental Status: He is oriented to person, place, and time.      Motor: Motor function is intact.   Psychiatric:         Mood and Affect: Mood normal.         Behavior: Behavior normal.         Thought Content: Thought content normal.       Significant Labs: All pertinent labs within the past 24 hours have been reviewed.    Significant Imaging: I have reviewed all pertinent imaging results/findings within the past 24 hours.

## 2022-11-28 NOTE — ASSESSMENT & PLAN NOTE
68 yo male who presented with Mary's gangrene of scrotum.  Clinically stable    - Admitted to hospital medicine, appreciate assistance  - Debrided 11/15 and 11/17 in OR. Medial aspect of wound debrided at bedside 11/22.  - Voiding spontaneously  - Diet, insulin and pain control per primary  - Cardiology consulted for SVT  - Recommend PT for weakness    - Continue antibiotics per infectious disease recs. On cefepime/flagyl.  - Anaerobic culture with Anaerococcus murdochii and Prevotella bucchalis.  - Dressing changes BID/ RN staff. Wound care following.  - Rest of care per primary

## 2022-11-28 NOTE — PROGRESS NOTES
[facility] ([place])  Urology  Progress Note    Patient Name: Nithin Wagner  MRN: [id number]  Admission Date: 11/15/2022  Hospital Length of Stay: 13 days  Code Status: Full Code   Attending Provider: Abelino Pollock MD   Primary Care Physician: Tushar Drew MD    Subjective:     HPI:  67M h/o DM2, CAD, HTN, HLD presents with scrotal pain and swelling. Started about 4 weeks ago and has been progressively worsening despite a couple courses of antibiotics (unsure which ones). He reports fevers and chills at home. He also reports nausea, decreased PO intake, multiple syncopal episodes, and stool incontinence. He is on continuous blood glucose monitoring and states blood sugars have been in 190s today. He was seen yesterday in urgent care and diagnosed with scrotal abscess with possible kami's; he referred to the ER but waited until today to come in.    He was hypotensive and tachycardic on arrival in the ED. Sepsis protocol was started. Labs and imaging are currently pending.         Interval History: NAEO. AFVSS. Cardiology following. Wound stable.    Review of Systems  Objective:     Temp:  [97.5 °F (36.4 °C)-98.7 °F (37.1 °C)] 97.5 °F (36.4 °C)  Pulse:  [] 91  Resp:  [16-18] 17  SpO2:  [90 %-95 %] 95 %  BP: (107-118)/(57-73) 118/66     Body mass index is 35.08 kg/m².           Drains       None                   Physical Exam  Vitals reviewed.   HENT:      Head: Normocephalic and atraumatic.   Abdominal:      General: Abdomen is flat.      Palpations: Abdomen is soft.   Genitourinary:     Penis: Normal.       Comments: Medial edge of incision with granulation tissue- no necrotic eschars  Testicle appropriately tender  No odor  No additional crepitus or purulent discharge  Musculoskeletal:         General: No swelling or deformity.      Cervical back: Neck supple.   Skin:     General: Skin is warm.      Findings: No bruising.   Psychiatric:         Mood and Affect: Mood normal.         Judgment:  Judgment normal.       Significant Labs:    BMP:  Recent Labs   Lab 11/26/22  0418 11/27/22  0352 11/28/22  0440    136 137   K 4.0 4.0 4.4   CL 94* 96 96   CO2 35* 32* 34*   BUN 6* 7* 9   CREATININE 0.8 0.8 1.0   CALCIUM 8.8 8.9 9.3       CBC:   Recent Labs   Lab 11/26/22  0418 11/27/22  0352 11/28/22  0440   WBC 8.58 9.17 10.75   HGB 14.6 14.5 15.4   HCT 44.2 43.8 46.9    325 376       Urine Studies:   Recent Labs   Lab 11/23/22  2314   COLORU Yellow   APPEARANCEUA Clear   PHUR 7.0   SPECGRAV 1.010   PROTEINUA Negative   GLUCUA Trace*   KETONESU 1+*   BILIRUBINUA Negative   OCCULTUA Negative   NITRITE Negative   UROBILINOGEN Negative   LEUKOCYTESUR Negative       Significant Imaging:  All pertinent imaging results/findings from the past 24 hours have been reviewed.        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  Clinically stable    - Admitted to hospital medicine, appreciate assistance  - Debrided 11/15 and 11/17 in OR. Medial aspect of wound debrided at bedside 11/22.  - Voiding spontaneously  - Diet, insulin and pain control per primary  - Cardiology consulted for SVT  - Recommend PT for weakness    - Continue antibiotics per infectious disease recs. On cefepime/flagyl.  - Anaerobic culture with Anaerococcus murdochii and Prevotella bucchalis.  - Dressing changes BID/ RN staff. Wound care following.  - Rest of care per primary        VTE Risk Mitigation (From admission, onward)         Ordered     IP VTE HIGH RISK PATIENT  Once         11/15/22 1948     Reason for No Pharmacological VTE Prophylaxis  Once        Question:  Reasons:  Answer:  Risk of Bleeding    11/15/22 1948     Place sequential compression device  Until discontinued         11/15/22 1936                Ryley Celaya MD  Urology  [facility] Med Surg ([place])

## 2022-11-28 NOTE — PT/OT/SLP PROGRESS
Physical Therapy Treatment    Patient Name:  Nithin Wagner   MRN:  [id number]    Recommendations:     Discharge Recommendations:  home   Discharge Equipment Recommendations: none   Barriers to discharge: None    Assessment:     Nithin Wagner is a 67 y.o. male admitted with a medical diagnosis of Sepsis.  He presents with the following impairments/functional limitations:  weakness, impaired endurance, impaired self care skills, impaired functional mobility, gait instability, pain, impaired skin, edema, impaired cardiopulmonary response to activity ;pt with good mobility today, reports feeling better today, NO LOB, mild SOB, O2:88-90% on RA w/ amb., inc to 95% at rest.    Rehab Prognosis: Good; patient would benefit from acute skilled PT services to address these deficits and reach maximum level of function.    Recent Surgery: Procedure(s) (LRB):  INCISION AND DRAINAGE, SCROTUM (N/A) 11 Days Post-Op    Plan:     During this hospitalization, patient to be seen 3 x/week to address the identified rehab impairments via gait training, therapeutic activities, therapeutic exercises, neuromuscular re-education and progress toward the following goals:    Plan of Care Expires:  12/21/22    Subjective     Chief Complaint: some pain, though recv'd pain meds recently  Patient/Family Comments/goals: pt agreeable to session, pleasant.   Pain/Comfort:  Pain Rating 1: 4/10  Location 1: scrotum  Pain Addressed 1: Pre-medicate for activity, Reposition, Distraction  Pain Rating Post-Intervention 1: 4/10      Objective:     Communicated with nurse prior to session.  Patient found HOB elevated with PICC line, telemetry upon PT entry to room.     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, as well as a portable ECG machine)      AM-PAC 6 CLICK MOBILITY  Turning over in bed (including adjusting bedclothes, sheets and blankets)?: 4  Sitting down on and standing up from a chair with arms (e.g., wheelchair, bedside commode, etc.): 4  Moving from lying on back to sitting on the side of the bed?: 4  Moving to and from a bed to a chair (including a wheelchair)?: 3  Need to walk in hospital room?: 3  Climbing 3-5 steps with a railing?: 3  Basic Mobility Total Score: 21       Treatment & Education:  Pt perf'd ADL's at sink w/ SBA    Patient left supine with all lines intact, call button in reach, bed alarm on, nurse notified, and LE's elevated (edema noted in BLE's). Pt educated on keeping HOB elevated as well. Will find a recliner so pt can sit up tomorrow ..    GOALS:   Multidisciplinary Problems       Physical Therapy Goals          Problem: Physical Therapy    Goal Priority Disciplines Outcome Goal Variances Interventions   Physical Therapy Goal     PT, PT/OT Ongoing, Progressing     Description: Goals to be met by: 22    Patient will increase functional independence with mobility by performin. Supine<>sit with SPV and 1 UE if needed.   2. Sit<>stand with LRAD with SPV.  3. Gait x 100 feet with LRAD with SBA.  4. Ascend/descend 10 step(s) with least restrictive assistive device and SBA.                        Time Tracking:     PT Received On: 22  PT Start Time: 1616     PT Stop Time: 1642  PT Total Time (min): 26 min     Billable Minutes: Gait Training 16 and Therapeutic Activity 10    Treatment Type: Treatment  PT/PTA: PTA     PTA Visit Number: 2022

## 2022-11-28 NOTE — SUBJECTIVE & OBJECTIVE
Interval History: NAEO. AFVSS. Cardiology following. Wound stable.    Review of Systems  Objective:     Temp:  [97.5 °F (36.4 °C)-98.7 °F (37.1 °C)] 97.5 °F (36.4 °C)  Pulse:  [] 91  Resp:  [16-18] 17  SpO2:  [90 %-95 %] 95 %  BP: (107-118)/(57-73) 118/66     Body mass index is 35.08 kg/m².           Drains       None                   Physical Exam  Vitals reviewed.   HENT:      Head: Normocephalic and atraumatic.   Abdominal:      General: Abdomen is flat.      Palpations: Abdomen is soft.   Genitourinary:     Penis: Normal.       Comments: Medial edge of incision with granulation tissue- no necrotic eschars  Testicle appropriately tender  No odor  No additional crepitus or purulent discharge  Musculoskeletal:         General: No swelling or deformity.      Cervical back: Neck supple.   Skin:     General: Skin is warm.      Findings: No bruising.   Psychiatric:         Mood and Affect: Mood normal.         Judgment: Judgment normal.       Significant Labs:    BMP:  Recent Labs   Lab 11/26/22 0418 11/27/22  0352 11/28/22  0440    136 137   K 4.0 4.0 4.4   CL 94* 96 96   CO2 35* 32* 34*   BUN 6* 7* 9   CREATININE 0.8 0.8 1.0   CALCIUM 8.8 8.9 9.3       CBC:   Recent Labs   Lab 11/26/22 0418 11/27/22  0352 11/28/22  0440   WBC 8.58 9.17 10.75   HGB 14.6 14.5 15.4   HCT 44.2 43.8 46.9    325 376       Urine Studies:   Recent Labs   Lab 11/23/22  2314   COLORU Yellow   APPEARANCEUA Clear   PHUR 7.0   SPECGRAV 1.010   PROTEINUA Negative   GLUCUA Trace*   KETONESU 1+*   BILIRUBINUA Negative   OCCULTUA Negative   NITRITE Negative   UROBILINOGEN Negative   LEUKOCYTESUR Negative       Significant Imaging:  All pertinent imaging results/findings from the past 24 hours have been reviewed.

## 2022-11-29 ENCOUNTER — PATIENT MESSAGE (OUTPATIENT)
Dept: DIABETES | Facility: CLINIC | Age: 67
End: 2022-11-29
Payer: MEDICARE

## 2022-11-29 LAB
ANION GAP SERPL CALC-SCNC: 6 MMOL/L (ref 8–16)
BASOPHILS # BLD AUTO: 0.06 K/UL (ref 0–0.2)
BASOPHILS NFR BLD: 0.7 % (ref 0–1.9)
BUN SERPL-MCNC: 10 MG/DL (ref 8–23)
CALCIUM SERPL-MCNC: 8.6 MG/DL (ref 8.7–10.5)
CHLORIDE SERPL-SCNC: 95 MMOL/L (ref 95–110)
CO2 SERPL-SCNC: 32 MMOL/L (ref 23–29)
CREAT SERPL-MCNC: 1 MG/DL (ref 0.5–1.4)
DIFFERENTIAL METHOD: ABNORMAL
EOSINOPHIL # BLD AUTO: 0.1 K/UL (ref 0–0.5)
EOSINOPHIL NFR BLD: 1.3 % (ref 0–8)
ERYTHROCYTE [DISTWIDTH] IN BLOOD BY AUTOMATED COUNT: 12.4 % (ref 11.5–14.5)
EST. GFR  (NO RACE VARIABLE): >60 ML/MIN/1.73 M^2
GLUCOSE SERPL-MCNC: 311 MG/DL (ref 70–110)
HCT VFR BLD AUTO: 44.8 % (ref 40–54)
HGB BLD-MCNC: 14.4 G/DL (ref 14–18)
IMM GRANULOCYTES # BLD AUTO: 0.07 K/UL (ref 0–0.04)
IMM GRANULOCYTES NFR BLD AUTO: 0.8 % (ref 0–0.5)
LYMPHOCYTES # BLD AUTO: 1.8 K/UL (ref 1–4.8)
LYMPHOCYTES NFR BLD: 19.9 % (ref 18–48)
MAGNESIUM SERPL-MCNC: 1.8 MG/DL (ref 1.6–2.6)
MCH RBC QN AUTO: 32.4 PG (ref 27–31)
MCHC RBC AUTO-ENTMCNC: 32.1 G/DL (ref 32–36)
MCV RBC AUTO: 101 FL (ref 82–98)
MONOCYTES # BLD AUTO: 1 K/UL (ref 0.3–1)
MONOCYTES NFR BLD: 10.5 % (ref 4–15)
NEUTROPHILS # BLD AUTO: 6 K/UL (ref 1.8–7.7)
NEUTROPHILS NFR BLD: 66.8 % (ref 38–73)
NRBC BLD-RTO: 0 /100 WBC
PHOSPHATE SERPL-MCNC: 3.1 MG/DL (ref 2.7–4.5)
PLATELET # BLD AUTO: 333 K/UL (ref 150–450)
PMV BLD AUTO: 11.4 FL (ref 9.2–12.9)
POTASSIUM SERPL-SCNC: 4.1 MMOL/L (ref 3.5–5.1)
RBC # BLD AUTO: 4.45 M/UL (ref 4.6–6.2)
SODIUM SERPL-SCNC: 133 MMOL/L (ref 136–145)
WBC # BLD AUTO: 9.04 K/UL (ref 3.9–12.7)

## 2022-11-29 PROCEDURE — 25000003 PHARM REV CODE 250: Performed by: INTERNAL MEDICINE

## 2022-11-29 PROCEDURE — 85025 COMPLETE CBC W/AUTO DIFF WBC: CPT | Performed by: HOSPITALIST

## 2022-11-29 PROCEDURE — 25000003 PHARM REV CODE 250: Performed by: UROLOGY

## 2022-11-29 PROCEDURE — 83735 ASSAY OF MAGNESIUM: CPT | Performed by: HOSPITALIST

## 2022-11-29 PROCEDURE — 97116 GAIT TRAINING THERAPY: CPT | Mod: CQ

## 2022-11-29 PROCEDURE — 25000003 PHARM REV CODE 250: Performed by: HOSPITALIST

## 2022-11-29 PROCEDURE — 99233 SBSQ HOSP IP/OBS HIGH 50: CPT | Mod: 95,,, | Performed by: STUDENT IN AN ORGANIZED HEALTH CARE EDUCATION/TRAINING PROGRAM

## 2022-11-29 PROCEDURE — 80048 BASIC METABOLIC PNL TOTAL CA: CPT | Performed by: HOSPITALIST

## 2022-11-29 PROCEDURE — 99233 PR SUBSEQUENT HOSPITAL CARE,LEVL III: ICD-10-PCS | Mod: ,,, | Performed by: INTERNAL MEDICINE

## 2022-11-29 PROCEDURE — 99233 PR SUBSEQUENT HOSPITAL CARE,LEVL III: ICD-10-PCS | Mod: 95,,, | Performed by: STUDENT IN AN ORGANIZED HEALTH CARE EDUCATION/TRAINING PROGRAM

## 2022-11-29 PROCEDURE — 94761 N-INVAS EAR/PLS OXIMETRY MLT: CPT

## 2022-11-29 PROCEDURE — 84100 ASSAY OF PHOSPHORUS: CPT | Performed by: HOSPITALIST

## 2022-11-29 PROCEDURE — 63600175 PHARM REV CODE 636 W HCPCS: Performed by: INTERNAL MEDICINE

## 2022-11-29 PROCEDURE — 99232 SBSQ HOSP IP/OBS MODERATE 35: CPT | Mod: GC,,, | Performed by: UROLOGY

## 2022-11-29 PROCEDURE — 63600175 PHARM REV CODE 636 W HCPCS: Performed by: NURSE PRACTITIONER

## 2022-11-29 PROCEDURE — 36415 COLL VENOUS BLD VENIPUNCTURE: CPT | Performed by: HOSPITALIST

## 2022-11-29 PROCEDURE — 25000003 PHARM REV CODE 250: Performed by: STUDENT IN AN ORGANIZED HEALTH CARE EDUCATION/TRAINING PROGRAM

## 2022-11-29 PROCEDURE — 63600175 PHARM REV CODE 636 W HCPCS: Performed by: HOSPITALIST

## 2022-11-29 PROCEDURE — 99233 SBSQ HOSP IP/OBS HIGH 50: CPT | Mod: ,,, | Performed by: INTERNAL MEDICINE

## 2022-11-29 PROCEDURE — 21400001 HC TELEMETRY ROOM

## 2022-11-29 PROCEDURE — 99232 PR SUBSEQUENT HOSPITAL CARE,LEVL II: ICD-10-PCS | Mod: GC,,, | Performed by: UROLOGY

## 2022-11-29 RX ORDER — TORSEMIDE 20 MG/1
20 TABLET ORAL DAILY
Status: DISCONTINUED | OUTPATIENT
Start: 2022-11-29 | End: 2022-12-01 | Stop reason: HOSPADM

## 2022-11-29 RX ADMIN — LOSARTAN POTASSIUM 25 MG: 25 TABLET, FILM COATED ORAL at 08:11

## 2022-11-29 RX ADMIN — FOLIC ACID 1 MG: 1 TABLET ORAL at 09:11

## 2022-11-29 RX ADMIN — MORPHINE SULFATE 6 MG: 2 INJECTION, SOLUTION INTRAMUSCULAR; INTRAVENOUS at 04:11

## 2022-11-29 RX ADMIN — INSULIN ASPART 8 UNITS: 100 INJECTION, SOLUTION INTRAVENOUS; SUBCUTANEOUS at 09:11

## 2022-11-29 RX ADMIN — INSULIN ASPART 8 UNITS: 100 INJECTION, SOLUTION INTRAVENOUS; SUBCUTANEOUS at 12:11

## 2022-11-29 RX ADMIN — OXYCODONE HYDROCHLORIDE AND ACETAMINOPHEN 1 TABLET: 5; 325 TABLET ORAL at 12:11

## 2022-11-29 RX ADMIN — EZETIMIBE 10 MG: 10 TABLET ORAL at 09:11

## 2022-11-29 RX ADMIN — NYSTATIN 500000 UNITS: 500000 SUSPENSION ORAL at 08:11

## 2022-11-29 RX ADMIN — CEFEPIME HYDROCHLORIDE 1 G: 1 INJECTION, SOLUTION INTRAVENOUS at 09:11

## 2022-11-29 RX ADMIN — SODIUM CHLORIDE: 0.9 INJECTION, SOLUTION INTRAVENOUS at 04:11

## 2022-11-29 RX ADMIN — OXYCODONE 15 MG: 5 TABLET ORAL at 05:11

## 2022-11-29 RX ADMIN — NYSTATIN 500000 UNITS: 500000 SUSPENSION ORAL at 09:11

## 2022-11-29 RX ADMIN — OXYCODONE 15 MG: 5 TABLET ORAL at 11:11

## 2022-11-29 RX ADMIN — ASPIRIN 81 MG CHEWABLE TABLET 81 MG: 81 TABLET CHEWABLE at 09:11

## 2022-11-29 RX ADMIN — NYSTATIN 500000 UNITS: 500000 SUSPENSION ORAL at 04:11

## 2022-11-29 RX ADMIN — METOPROLOL TARTRATE 50 MG: 50 TABLET, FILM COATED ORAL at 08:11

## 2022-11-29 RX ADMIN — CEFEPIME HYDROCHLORIDE 1 G: 1 INJECTION, SOLUTION INTRAVENOUS at 01:11

## 2022-11-29 RX ADMIN — OXYCODONE 15 MG: 5 TABLET ORAL at 01:11

## 2022-11-29 RX ADMIN — MORPHINE SULFATE 6 MG: 2 INJECTION, SOLUTION INTRAMUSCULAR; INTRAVENOUS at 09:11

## 2022-11-29 RX ADMIN — OXYCODONE 15 MG: 5 TABLET ORAL at 06:11

## 2022-11-29 RX ADMIN — SODIUM CHLORIDE: 0.9 INJECTION, SOLUTION INTRAVENOUS at 09:11

## 2022-11-29 RX ADMIN — INSULIN DETEMIR 38 UNITS: 100 INJECTION, SOLUTION SUBCUTANEOUS at 09:11

## 2022-11-29 RX ADMIN — MORPHINE SULFATE 6 MG: 2 INJECTION, SOLUTION INTRAMUSCULAR; INTRAVENOUS at 02:11

## 2022-11-29 RX ADMIN — METRONIDAZOLE 500 MG: 500 TABLET ORAL at 01:11

## 2022-11-29 RX ADMIN — ONDANSETRON 4 MG: 2 INJECTION INTRAMUSCULAR; INTRAVENOUS at 02:11

## 2022-11-29 RX ADMIN — INSULIN ASPART 6 UNITS: 100 INJECTION, SOLUTION INTRAVENOUS; SUBCUTANEOUS at 09:11

## 2022-11-29 RX ADMIN — DIGOXIN 125 MCG: 125 TABLET ORAL at 09:11

## 2022-11-29 RX ADMIN — CEFEPIME HYDROCHLORIDE 1 G: 1 INJECTION, SOLUTION INTRAVENOUS at 04:11

## 2022-11-29 RX ADMIN — ONDANSETRON 4 MG: 2 INJECTION INTRAMUSCULAR; INTRAVENOUS at 04:11

## 2022-11-29 RX ADMIN — INSULIN ASPART 6 UNITS: 100 INJECTION, SOLUTION INTRAVENOUS; SUBCUTANEOUS at 12:11

## 2022-11-29 RX ADMIN — NYSTATIN 500000 UNITS: 500000 SUSPENSION ORAL at 12:11

## 2022-11-29 RX ADMIN — LOSARTAN POTASSIUM 25 MG: 25 TABLET, FILM COATED ORAL at 09:11

## 2022-11-29 RX ADMIN — INSULIN ASPART 4 UNITS: 100 INJECTION, SOLUTION INTRAVENOUS; SUBCUTANEOUS at 04:11

## 2022-11-29 RX ADMIN — THERA TABS 1 TABLET: TAB at 09:11

## 2022-11-29 RX ADMIN — DILTIAZEM HYDROCHLORIDE 120 MG: 120 CAPSULE, COATED, EXTENDED RELEASE ORAL at 08:11

## 2022-11-29 RX ADMIN — METOPROLOL TARTRATE 50 MG: 50 TABLET, FILM COATED ORAL at 09:11

## 2022-11-29 RX ADMIN — TORSEMIDE 20 MG: 20 TABLET ORAL at 09:11

## 2022-11-29 RX ADMIN — OXYCODONE 15 MG: 5 TABLET ORAL at 12:11

## 2022-11-29 RX ADMIN — METRONIDAZOLE 500 MG: 500 TABLET ORAL at 05:11

## 2022-11-29 RX ADMIN — METRONIDAZOLE 500 MG: 500 TABLET ORAL at 09:11

## 2022-11-29 RX ADMIN — MORPHINE SULFATE 6 MG: 2 INJECTION, SOLUTION INTRAMUSCULAR; INTRAVENOUS at 08:11

## 2022-11-29 RX ADMIN — DILTIAZEM HYDROCHLORIDE 120 MG: 120 CAPSULE, COATED, EXTENDED RELEASE ORAL at 09:11

## 2022-11-29 RX ADMIN — INSULIN ASPART 8 UNITS: 100 INJECTION, SOLUTION INTRAVENOUS; SUBCUTANEOUS at 04:11

## 2022-11-29 NOTE — PHYSICIAN QUERY
"PT Name: Nithin Wagner  MR #: [id number]    DOCUMENTATION CLARIFICATION     CDS/: Korin Mendez RN          Contact Information: [email or web address]    This form is a permanent document in the medical record.    Query Date: November 29, 2022  By submitting this query, we are merely seeking further clarification of documentation. Please utilize your independent clinical judgment when addressing the question(s) below.    The Medical Record contains the following:   Indicator Supporting Clinical Findings Location in Medical Record     x Documentation of "Debridement" The patient had necrotic eschar on the medial aspect of his scrotal wound. The skin underlying these areas was instilled with 1% lidocaine without epinephrine. We then sharply debrided this tissue until healthy bleeding skin edge was met. There remainder of the wound appeared viable with granulation tissue within the wound bed. 11/22/2022 Procedure Note    Documentation of "I&D"      Other       Excisional debridement is the surgical removal or cutting away of such tissue, necrosis, or slough and is classified to the root operation "Excision." Use of a sharp instrument does not always indicate that an excisional debridement was performed. Minor removal of loose fragments with scissors or using a sharp instrument to scrape away tissue is not an excisional debridement. Excisional debridement involves the use of a scalpel to remove devitalized tissue.  Nonexcisional debridement is the nonoperative brushing, irrigating, scrubbing, or washing of devitalized tissue, necrosis, slough, or foreign material. Most nonexcisional debridement procedures are classified to the root operation "Extraction" (pulling or stripping out or off all or a portion of a body part by the use of force).     Provider, please provide further clarification on the procedure performed on the scrotum on 11/22/2022:    [ X ] Excisional Debridement of skin   [   ] Excisional " Debridement of subcutaneous tissue/fascia        [   ] Non-excisional Debridement of skin   [   ] Non-excisional Debridement of subcutaneous tissue/fascia     [   ] Other Procedure (please specify): _____________   [  ] Clinically Undetermined     Reference:    ICD-10-CM/PCS Coding Clinic Third Quarter ICD-10, Effective with discharges: October 7, 2015 [facility] [employer] § Excisional and nonexcisional debridement (2015).    Form No. 33616

## 2022-11-29 NOTE — SUBJECTIVE & OBJECTIVE
Interval History: Complains of significant pain in scrotum. Very concerned about changing dressings - lives alone and has significant pain.     Review of Systems   Constitutional:  Negative for fever.   Genitourinary:  Positive for scrotal swelling and testicular pain.   All other systems reviewed and are negative.  Objective:     Vital Signs (Most Recent):  Temp: 98.8 °F (37.1 °C) (11/29/22 1125)  Pulse: 82 (11/29/22 1200)  Resp: 16 (11/29/22 1219)  BP: 123/64 (11/29/22 1125)  SpO2: (!) 94 % (11/29/22 1125)   Vital Signs (24h Range):  Temp:  [97.5 °F (36.4 °C)-98.8 °F (37.1 °C)] 98.8 °F (37.1 °C)  Pulse:  [] 82  Resp:  [16-20] 16  SpO2:  [88 %-96 %] 94 %  BP: (105-137)/(64-85) 123/64     Weight: 114.1 kg (251 lb 8.7 oz)  Body mass index is 35.08 kg/m².    Estimated Creatinine Clearance: 89.5 mL/min (based on SCr of 1 mg/dL).    Physical Exam  Vitals and nursing note reviewed.   Constitutional:       General: He is not in acute distress.     Appearance: Normal appearance. He is not ill-appearing, toxic-appearing or diaphoretic.   HENT:      Head: Normocephalic and atraumatic.      Right Ear: External ear normal.      Left Ear: External ear normal.   Eyes:      Extraocular Movements: Extraocular movements intact.      Conjunctiva/sclera: Conjunctivae normal.      Pupils: Pupils are equal, round, and reactive to light.   Cardiovascular:      Rate and Rhythm: Normal rate and regular rhythm.   Pulmonary:      Breath sounds: No wheezing, rhonchi or rales.   Abdominal:      Tenderness: There is no guarding or rebound.   Genitourinary:     Comments: Erythema and tenderness around packed scrotal wound. No drainage.  Musculoskeletal:      Right lower leg: Edema present.      Left lower leg: Edema present.   Skin:     General: Skin is warm and dry.   Neurological:      General: No focal deficit present.      Mental Status: He is alert and oriented to person, place, and time.   Psychiatric:         Mood and Affect: Mood  normal.         Behavior: Behavior normal.         Thought Content: Thought content normal.         Judgment: Judgment normal.       Significant Labs: CBC:   Recent Labs   Lab 11/28/22 0440 11/29/22  0405   WBC 10.75 9.04   HGB 15.4 14.4   HCT 46.9 44.8    333     CMP:   Recent Labs   Lab 11/28/22 0440 11/29/22  0405    133*   K 4.4 4.1   CL 96 95   CO2 34* 32*   * 311*   BUN 9 10   CREATININE 1.0 1.0   CALCIUM 9.3 8.6*   ANIONGAP 7* 6*     Wound Culture:   Recent Labs   Lab 11/15/22  2139   LABAERO No growth       Significant Imaging: None

## 2022-11-29 NOTE — PT/OT/SLP PROGRESS
Physical Therapy Treatment    Patient Name:  Nithin Wagner   MRN:  [id number]    Recommendations:     Discharge Recommendations:  home, though will defer to medical team   Discharge Equipment Recommendations:  (possibly rollator, will trial tomorrow)   Barriers to discharge: None    Assessment:     Nithin Wagner is a 67 y.o. male admitted with a medical diagnosis of Sepsis.  He presents with the following impairments/functional limitations:  weakness, impaired endurance, impaired self care skills, impaired functional mobility, gait instability, decreased safety awareness, pain, impaired skin, edema, impaired cardiopulmonary response to activity ;pt with good mobility, ambulates well in hallway, though dec O2 sats noted upon return (pt did not want to use O2 for amb.), 78% on RA following. Does inc into 90's w/ seated rest.    Rehab Prognosis: Good; patient would benefit from acute skilled PT services to address these deficits and reach maximum level of function.    Recent Surgery: Procedure(s) (LRB):  INCISION AND DRAINAGE, SCROTUM (N/A) 12 Days Post-Op    Plan:     During this hospitalization, patient to be seen 3 x/week to address the identified rehab impairments via gait training, therapeutic activities, therapeutic exercises, neuromuscular re-education and progress toward the following goals:    Plan of Care Expires:  12/21/22    Subjective     Chief Complaint: pain following amb. Yesterday, did not c/o pain today  Patient/Family Comments/goals: pt agreeable to session, requesting a rollator.  Pain/Comfort:  Pain Rating 1:  (pt did not report pain this session, though did state his pain was worse last night)  Location 1: scrotum      Objective:     Communicated with nurse prior to session.  Patient found HOB elevated with telemetry, PICC line upon PT entry to room.     General Precautions: Standard, fall, diabetic   Orthopedic Precautions:N/A   Braces: N/A  Respiratory Status: Nasal cannula, flow 3 L/min      Functional Mobility:  Bed Mobility:     Supine to Sit: modified independence  Transfers:     Sit to Stand:  modified independence with no AD  Gait: amb'd ~200' w/ SBA, O2 on RA (per pt request). O2 :78% following, though does inc to 90's w/ sitting rest. Pt left supine w/ O2@3L.       AM-PAC 6 CLICK MOBILITY  Turning over in bed (including adjusting bedclothes, sheets and blankets)?: 4  Sitting down on and standing up from a chair with arms (e.g., wheelchair, bedside commode, etc.): 4  Moving from lying on back to sitting on the side of the bed?: 4  Moving to and from a bed to a chair (including a wheelchair)?: 3  Need to walk in hospital room?: 3  Climbing 3-5 steps with a railing?: 3  Basic Mobility Total Score: 21       Treatment & Education:      Patient left supine with all lines intact, call button in reach, and nurse notified..    GOALS:   Multidisciplinary Problems       Physical Therapy Goals          Problem: Physical Therapy    Goal Priority Disciplines Outcome Goal Variances Interventions   Physical Therapy Goal     PT, PT/OT Ongoing, Progressing     Description: Goals to be met by: 22    Patient will increase functional independence with mobility by performin. Supine<>sit with SPV and 1 UE if needed.   2. Sit<>stand with LRAD with SPV.  3. Gait x 100 feet with LRAD with SBA.  4. Ascend/descend 10 step(s) with least restrictive assistive device and SBA.                        Time Tracking:     PT Received On: 22  PT Start Time: 1135     PT Stop Time: 1150  PT Total Time (min): 15 min     Billable Minutes: Gait Training 15    Treatment Type: Treatment  PT/PTA: PTA     PTA Visit Number: 2     2022

## 2022-11-29 NOTE — PLAN OF CARE
Due to change in status patient is more appropriate for LTAC vs SNF placement secondary to prolonged IV bax and BID dressing changes. Patient choice form signed and would like [facility] as preferred choice. Referral in [facility].   11/29/22 1321   Post-Acute Status   Post-Acute Authorization Placement   Post-Acute Placement Status Referrals Sent   Patient choice form signed by patient/caregiver List with quality metrics by geographic area provided;List from CMS Compare;List from System Post-Acute Care   Discharge Delays (!) Post-Acute Set-up   Discharge Plan   Discharge Plan A Long-term acute care facility (LTAC)   Discharge Plan B Skilled Nursing Facility

## 2022-11-29 NOTE — PLAN OF CARE
Problem: Adult Inpatient Plan of Care  Goal: Plan of Care Review  Outcome: Ongoing, Progressing  Flowsheets (Taken 11/29/2022 0012)  Plan of Care Reviewed With: patient  Goal: Patient-Specific Goal (Individualized)  Outcome: Ongoing, Progressing  Goal: Absence of Hospital-Acquired Illness or Injury  Outcome: Ongoing, Progressing  Intervention: Identify and Manage Fall Risk  Flowsheets (Taken 11/29/2022 0012)  Safety Promotion/Fall Prevention:   assistive device/personal item within reach   commode/urinal/bedpan at bedside   high risk medications identified   lighting adjusted   medications reviewed   nonskid shoes/socks when out of bed   side rails raised x 2  Intervention: Prevent Skin Injury  Flowsheets (Taken 11/29/2022 0012)  Body Position:   supine   position changed independently   position maintained  Skin Protection:   incontinence pads utilized   tubing/devices free from skin contact  Intervention: Prevent and Manage VTE (Venous Thromboembolism) Risk  Flowsheets (Taken 11/29/2022 0012)  Activity Management: Up to bedside commode - L3  VTE Prevention/Management:   ambulation promoted   ROM (active) performed  Range of Motion: active ROM (range of motion) encouraged  Intervention: Prevent Infection  Flowsheets (Taken 11/29/2022 0012)  Infection Prevention:   environmental surveillance performed   equipment surfaces disinfected   hand hygiene promoted   personal protective equipment utilized   rest/sleep promoted  Goal: Optimal Comfort and Wellbeing  Outcome: Ongoing, Progressing  Intervention: Monitor Pain and Promote Comfort  Flowsheets (Taken 11/29/2022 0012)  Pain Management Interventions:   medication offered   quiet environment facilitated   relaxation techniques promoted  Intervention: Provide Person-Centered Care  Flowsheets (Taken 11/29/2022 0012)  Trust Relationship/Rapport:   care explained   choices provided   emotional support provided   empathic listening provided   thoughts/feelings  acknowledged   reassurance provided   questions encouraged   questions answered  Goal: Readiness for Transition of Care  Outcome: Ongoing, Progressing  Intervention: Mutually Develop Transition Plan  Flowsheets (Taken 11/29/2022 0012)  Equipment Currently Used at Home: oxygen  Communicated ALEXANDER with patient/caregiver: Date not available/Unable to determine     Problem: Impaired Wound Healing  Goal: Optimal Wound Healing  Outcome: Ongoing, Progressing  Intervention: Promote Wound Healing  Flowsheets (Taken 11/29/2022 0012)  Oral Nutrition Promotion:   calorie-dense foods provided   calorie-dense liquids provided   medicated  Sleep/Rest Enhancement:   awakenings minimized   natural light exposure provided   noise level reduced   regular sleep/rest pattern promoted   relaxation techniques promoted   room darkened  Activity Management: Up to bedside commode - L3  Pain Management Interventions:   medication offered   quiet environment facilitated   relaxation techniques promoted     Problem: Adjustment to Illness (Sepsis/Septic Shock)  Goal: Optimal Coping  Outcome: Ongoing, Progressing  Intervention: Optimize Psychosocial Adjustment to Illness  Flowsheets (Taken 11/29/2022 0012)  Supportive Measures:   active listening utilized   relaxation techniques promoted   self-care encouraged  Family/Support System Care:   support provided   self-care encouraged     Problem: Bleeding (Sepsis/Septic Shock)  Goal: Absence of Bleeding  Outcome: Ongoing, Progressing  Intervention: Monitor and Manage Bleeding  Flowsheets (Taken 11/29/2022 0012)  Bleeding Precautions: blood pressure closely monitored  Bleeding Management: dressing monitored     Problem: Glycemic Control Impaired (Sepsis/Septic Shock)  Goal: Blood Glucose Level Within Desired Range  Outcome: Ongoing, Progressing  Intervention: Optimize Glycemic Control  Flowsheets (Taken 11/29/2022 0012)  Glycemic Management:   blood glucose monitored   supplemental insulin given      Problem: Infection Progression (Sepsis/Septic Shock)  Goal: Absence of Infection Signs and Symptoms  Outcome: Ongoing, Progressing  Intervention: Initiate Sepsis Management  Flowsheets (Taken 11/29/2022 0012)  Infection Prevention:   environmental surveillance performed   equipment surfaces disinfected   hand hygiene promoted   personal protective equipment utilized   rest/sleep promoted  Infection Management: aseptic technique maintained  Stabilization Measures:   airway opened   legs elevated  Isolation Precautions:   protective   precautions maintained  Intervention: Promote Stabilization  Flowsheets (Taken 11/29/2022 0012)  Fluid/Electrolyte Management: fluids provided  Lung Protection Measures: fluid excess minimized  Intervention: Promote Recovery  Flowsheets (Taken 11/29/2022 0012)  Sleep/Rest Enhancement:   awakenings minimized   natural light exposure provided   noise level reduced   regular sleep/rest pattern promoted   relaxation techniques promoted   room darkened  Activity Management: Up to bedside commode - L3     Problem: Nutrition Impaired (Sepsis/Septic Shock)  Goal: Optimal Nutrition Intake  Outcome: Ongoing, Progressing     Problem: Fluid and Electrolyte Imbalance (Acute Kidney Injury/Impairment)  Goal: Fluid and Electrolyte Balance  Outcome: Ongoing, Progressing  Intervention: Monitor and Manage Fluid and Electrolyte Balance  Flowsheets (Taken 11/29/2022 0012)  Fluid/Electrolyte Management: fluids provided     Problem: Oral Intake Inadequate (Acute Kidney Injury/Impairment)  Goal: Optimal Nutrition Intake  Outcome: Ongoing, Progressing  Intervention: Promote and Optimize Nutrition  Flowsheets (Taken 11/29/2022 0012)  Oral Nutrition Promotion:   calorie-dense foods provided   calorie-dense liquids provided   medicated     Problem: Renal Function Impairment (Acute Kidney Injury/Impairment)  Goal: Effective Renal Function  Outcome: Ongoing, Progressing  Intervention: Monitor and Support Renal  Function  Flowsheets (Taken 11/29/2022 0012)  Stabilization Measures:   airway opened   legs elevated  Medication Review/Management:   medications reviewed   high-risk medications identified     Problem: Diabetes Comorbidity  Goal: Blood Glucose Level Within Targeted Range  Outcome: Ongoing, Progressing  Intervention: Monitor and Manage Glycemia  Flowsheets (Taken 11/29/2022 0012)  Glycemic Management:   blood glucose monitored   supplemental insulin given     Problem: Infection  Goal: Absence of Infection Signs and Symptoms  Outcome: Ongoing, Progressing  Intervention: Prevent or Manage Infection  Flowsheets (Taken 11/29/2022 0012)  Infection Management: aseptic technique maintained  Isolation Precautions:   protective   precautions maintained     Problem: Skin Injury Risk Increased  Goal: Skin Health and Integrity  Outcome: Ongoing, Progressing  Intervention: Optimize Skin Protection  Flowsheets (Taken 11/29/2022 0012)  Pressure Reduction Techniques:   frequent weight shift encouraged   positioned off wounds  Pressure Reduction Devices: positioning supports utilized  Skin Protection:   incontinence pads utilized   tubing/devices free from skin contact  Head of Bed (HOB) Positioning: HOB elevated  Intervention: Promote and Optimize Oral Intake  Flowsheets (Taken 11/29/2022 0012)  Oral Nutrition Promotion:   calorie-dense foods provided   calorie-dense liquids provided   medicated     Problem: Fall Injury Risk  Goal: Absence of Fall and Fall-Related Injury  Outcome: Ongoing, Progressing  Intervention: Identify and Manage Contributors  Flowsheets (Taken 11/29/2022 0012)  Self-Care Promotion:   independence encouraged   BADL personal objects within reach  Medication Review/Management:   medications reviewed   high-risk medications identified  Intervention: Promote Injury-Free Environment  Flowsheets (Taken 11/29/2022 0012)  Safety Promotion/Fall Prevention:   assistive device/personal item within reach    commode/urinal/bedpan at bedside   high risk medications identified   lighting adjusted   medications reviewed   nonskid shoes/socks when out of bed   side rails raised x 2

## 2022-11-29 NOTE — SUBJECTIVE & OBJECTIVE
Interval History: patient with continued episodes of SVT, cardiology following and diltiazem added today. Reduced losartan and lasix. Patient worked well with therapy after who recommend home upon discharge. Awaiting final ID recs.     Review of Systems   Constitutional:  Negative for chills and fever.   Respiratory:  Negative for cough and shortness of breath.    Cardiovascular:  Negative for chest pain.   Genitourinary:  Positive for scrotal swelling.   Objective:     Vital Signs (Most Recent):  Temp: 98.2 °F (36.8 °C) (11/28/22 1659)  Pulse: 85 (11/28/22 1816)  Resp: 16 (11/28/22 1712)  BP: 105/64 (11/28/22 1659)  SpO2: (!) 92 % (11/28/22 1720)   Vital Signs (24h Range):  Temp:  [97.5 °F (36.4 °C)-98.7 °F (37.1 °C)] 98.2 °F (36.8 °C)  Pulse:  [] 85  Resp:  [16-18] 16  SpO2:  [90 %-95 %] 92 %  BP: (105-118)/(57-75) 105/64     Weight: 114.1 kg (251 lb 8.7 oz)  Body mass index is 35.08 kg/m².    Intake/Output Summary (Last 24 hours) at 11/28/2022 1855  Last data filed at 11/28/2022 1053  Gross per 24 hour   Intake --   Output 850 ml   Net -850 ml        Physical Exam  Vitals reviewed.   Constitutional:       Appearance: Normal appearance.   HENT:      Head: Normocephalic and atraumatic.   Eyes:      General: No scleral icterus.     Conjunctiva/sclera: Conjunctivae normal.   Pulmonary:      Effort: Pulmonary effort is normal. No respiratory distress.   Skin:     Coloration: Skin is not jaundiced.      Findings: No erythema.   Neurological:      General: No focal deficit present.      Mental Status: He is alert and oriented to person, place, and time.   Psychiatric:         Mood and Affect: Mood normal.         Behavior: Behavior normal.       Significant Labs: All pertinent labs within the past 24 hours have been reviewed.    Significant Imaging: I have reviewed all pertinent imaging results/findings within the past 24 hours.

## 2022-11-29 NOTE — PROGRESS NOTES
[facility] Medicine  Telemedicine Progress Note    Patient Name: Nithin Wagner  MRN: [id number]  Patient Class: IP- Inpatient   Admission Date: 11/15/2022  Length of Stay: 13 days  Attending Physician: Abelino Pollock MD  Primary Care Provider: Tushar Drew MD          Subjective:     Principal Problem:Sepsis        HPI:  The patient is a 67M with a past medical history of DM2, CAD, HTN, and HLD who presents with scrotal pain and swelling. Started about 4 weeks ago and has been progressively worsening despite a couple courses of antibiotics (unsure which ones). He reports fevers and chills at home, nausea, decreased PO intake, and multiple syncopal episodes with +incontinence of stool. Unable to urinate for the past 24 hours or so. Sugars 190s at home. He was seen yesterday in urgent care and referred to the ER, but says he wasn't able to make it at that time so he comes today instead. On initial workup, the patient has CT evidence of Fourniers gangrene.  He will be brought to the operating room emergently and admitted after to ICU for further management.      Overview/Hospital Course:  Mr. Wagner presented with scrotal pain and swelling.  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  Empiric initiated with cefepime, clindamycin, metronidazole, and vancomycin.  Urology consulted and he underwent emergent excision and debridement of necrotic tissue on 11/15. Repeat washout performed 11/17. Improved and stepped down from ICU 11/18.  Bedside debridement performed on 11/22.  ID consulted and antibiotics regimen changed to cefepime and Flagyl known.  Wound cultures with Anaerococcus and Prevotella species; awaiting sensitivities.  Renal function normalized.  Return of SVT and Cardiology consulted with start of metoprolol.  Awaiting wound culture sensitivities.      Interval History: patient with continued episodes of SVT, cardiology following and diltiazem added today. Reduced  losartan and lasix. Patient worked well with therapy after who recommend home upon discharge. Awaiting final ID recs.     Review of Systems   Constitutional:  Negative for chills and fever.   Respiratory:  Negative for cough and shortness of breath.    Cardiovascular:  Negative for chest pain.   Genitourinary:  Positive for scrotal swelling.   Objective:     Vital Signs (Most Recent):  Temp: 98.2 °F (36.8 °C) (11/28/22 1659)  Pulse: 85 (11/28/22 1816)  Resp: 16 (11/28/22 1712)  BP: 105/64 (11/28/22 1659)  SpO2: (!) 92 % (11/28/22 1720)   Vital Signs (24h Range):  Temp:  [97.5 °F (36.4 °C)-98.7 °F (37.1 °C)] 98.2 °F (36.8 °C)  Pulse:  [] 85  Resp:  [16-18] 16  SpO2:  [90 %-95 %] 92 %  BP: (105-118)/(57-75) 105/64     Weight: 114.1 kg (251 lb 8.7 oz)  Body mass index is 35.08 kg/m².    Intake/Output Summary (Last 24 hours) at 11/28/2022 1855  Last data filed at 11/28/2022 1053  Gross per 24 hour   Intake --   Output 850 ml   Net -850 ml        Physical Exam  Vitals reviewed.   Constitutional:       Appearance: Normal appearance.   HENT:      Head: Normocephalic and atraumatic.   Eyes:      General: No scleral icterus.     Conjunctiva/sclera: Conjunctivae normal.   Pulmonary:      Effort: Pulmonary effort is normal. No respiratory distress.   Skin:     Coloration: Skin is not jaundiced.      Findings: No erythema.   Neurological:      General: No focal deficit present.      Mental Status: He is alert and oriented to person, place, and time.   Psychiatric:         Mood and Affect: Mood normal.         Behavior: Behavior normal.       Significant Labs: All pertinent labs within the past 24 hours have been reviewed.    Significant Imaging: I have reviewed all pertinent imaging results/findings within the past 24 hours.      Assessment/Plan:      * Sepsis  Mary's Gangrene of Scrotum  Leukocytosis  Lactic acidosis  Elevated troponin  - Patient criteria for sepsis with infective focus of Mary gangrene of the  scrotum  - Lactic acid level 4.4, procalcitonin >48, WBC 24, and elevated troponin levels that trended down  - Urology consulted and patient underwent excision and debridement of necrotic scrotal tissue on 11/15; repeat debridement 11/17  - Patient was on cefepime 1g IV q.12, clindamycin 900 mg IV Q 8, metronidazole 500 mg IV Q 8 and vancomycin dosed by pharmacy  - Elevated troponins likely secondary to sepsis and setting of DONNA given no reported chest pain and no acute EKG changes  - Blood cultures no growth to date and wound cultures with Anaerococcus and Prevotella species  - Wound care ordered and Urology following  - Clinically was improving with resolution of lactic acidosis   - Adjust pain medication regimen with addition of medication for breakthrough pain  - ID consult appreciated, and clindamycin and vancomycin stopped, and patient continued cefepime 1g IV q.12 and metronidazole 500 mg IV Q 8 on  11/21  - Ricketts removed on 11/21  - Urology performed beside debridement on 11/22  - Clinically worse on 11/23; repeat blood cultures NGTD, UA negative and CXR with edema  - Stopped IV fluids and restarted lasix  - Clinically improving, follow up studies and await sensitivities for final recommendation for antibiotic regimen by ID along with clearance from Urology that the patient is ready for discharge  - Patient will need home health     SVT (supraventricular tachycardia)  - Noted runs of SVT on monitor  - Recently diagnosed and treated with digoxin   - Return overnight of SVT   - Reconsult Cardiology, metoprolol added on 11/25  - patient with continued episodes of SVT, cardiology following and diltiazem added 11/28. Reduced losartan and lasix    Chronic diastolic heart failure  CAD  - Last echo with normal EF and grade 1 diastolic dysfunction on 10/28/2022  - Elevated troponin due to sepsis with associated hypotension along with DONNA and CHF which were trended down and no ectopy noted on telemetry, and no acute  "EKG changes  - Continue digoxin 125 mcg p.o. daily, ASA 81 mg p.o. daily and ezetimibe 10 mg p.o. 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  - Glucoses levels reviewed, not at targets   - Increase detemir to 38U subQ daily, and moderate-dose sliding scale insulin aspart 1-10U subQ TIDWM PRN, add Novolog 8U QAC  - Will adjust insulin regimen as needed to achieve glucose between 140 - 180    Essential hypertension  - Hypotensive was on admit that responded to treatment with IV fluids with no need for pressor support  - Blood pressure predominantly in normal range; monitor.  - Restarted BP medication, substitute with losartan 100 mg daily  - Cardiology added metoprolol      VTE Risk Mitigation (From admission, onward)         Ordered     IP VTE HIGH RISK PATIENT  Once         11/15/22 1948     Reason for No Pharmacological VTE Prophylaxis  Once        Question:  Reasons:  Answer:  Risk of Bleeding    11/15/22 1948     Place sequential compression device  Until discontinued         11/15/22 1936                      I have completed this tele-visit with the assistance of a telepresenter.    The attending portion of this evaluation, treatment, and documentation was performed per Abelino Pollock MD via " Telemedicine AudioVisual using the secure [employer] software platform with 2 way audio/video. The provider was located off-site and the patient is located in the hospital. The aforementioned video software was utilized to document the relevant history and physical exam    Abelino Pollock MD  Department of Hospital Medicine   [facility] ([place])

## 2022-11-29 NOTE — SUBJECTIVE & OBJECTIVE
Interval History: AFVSS. Continued SVT managed by cards. Wound stable. Reports some worse pain today.    Review of Systems  Objective:     Temp:  [98 °F (36.7 °C)-98.7 °F (37.1 °C)] 98.4 °F (36.9 °C)  Pulse:  [] 88  Resp:  [16-20] 16  SpO2:  [88 %-96 %] 88 %  BP: (105-137)/(64-75) 115/68     Body mass index is 35.08 kg/m².           Drains       None                   Physical Exam  Vitals reviewed.   HENT:      Head: Normocephalic and atraumatic.   Abdominal:      General: Abdomen is flat.      Palpations: Abdomen is soft.   Genitourinary:     Penis: Normal.       Comments: Medial edge of incision with granulation tissue- no necrotic eschars  Testicle appropriately tender  No odor  No additional crepitus or purulent discharge  Musculoskeletal:         General: No swelling or deformity.      Cervical back: Neck supple.   Skin:     General: Skin is warm.      Findings: No bruising.   Psychiatric:         Mood and Affect: Mood normal.         Judgment: Judgment normal.             Significant Labs:    BMP:  Recent Labs   Lab 11/27/22  0352 11/28/22  0440 11/29/22  0405    137 133*   K 4.0 4.4 4.1   CL 96 96 95   CO2 32* 34* 32*   BUN 7* 9 10   CREATININE 0.8 1.0 1.0   CALCIUM 8.9 9.3 8.6*       CBC:   Recent Labs   Lab 11/27/22  0352 11/28/22  0440 11/29/22  0405   WBC 9.17 10.75 9.04   HGB 14.5 15.4 14.4   HCT 43.8 46.9 44.8    376 333       Urine Studies:   Recent Labs   Lab 11/23/22  2314   COLORU Yellow   APPEARANCEUA Clear   PHUR 7.0   SPECGRAV 1.010   PROTEINUA Negative   GLUCUA Trace*   KETONESU 1+*   BILIRUBINUA Negative   OCCULTUA Negative   NITRITE Negative   UROBILINOGEN Negative   LEUKOCYTESUR Negative       Significant Imaging:  All pertinent imaging results/findings from the past 24 hours have been reviewed.

## 2022-11-29 NOTE — ASSESSMENT & PLAN NOTE
68 yo man with RA admitted with scrotal abscess and Mary's after suffering a leg infection (left thigh) and failing outpatient abx  - s/p debridement x 3  - culture only grew Anaerococcus and Prevotella (though patient was on abx prior to admission)    - having pain with walking, dressing changes  - continue wound care - lives alone    - Change antibiotics to ertapenem IV for outpatient/post-acute therapy    Consider SNF/LTAC for IV antibiotics, wound care, and pain control.

## 2022-11-29 NOTE — PROGRESS NOTES
[facility]    Admission date:  11/15/2022     Assessment    Chronic systolic heart failure   Compensated     Coronary atherosclerosis   Stable     Supraventricular tachycardia   Nothing but sinus rhythm on telemetry since adding diltiazem    Hypertension   Adequate control    Plan and Discussion    Will change furosemide to torsemide.  Continue current regimen of metoprolol, diltiazem, and digoxin.    Subjective    Intermittent shortness of breath.  Reports some pain when he worked with therapy yesterday.  Will try again today.    Medications  Current Facility-Administered Medications   Medication Dose Route Frequency Provider Last Rate Last Admin    0.9%  NaCl infusion   Intravenous PRN Abelino Pollock MD 10 mL/hr at 11/28/22 1717 New Bag at 11/28/22 1717    acetaminophen tablet 650 mg  650 mg Oral Q4H PRN Roger Davis NP   650 mg at 11/28/22 0203    albuterol-ipratropium 2.5 mg-0.5 mg/3 mL nebulizer solution 3 mL  3 mL Nebulization Q4H PRN William Hatch MD   3 mL at 11/28/22 1920    aspirin chewable tablet 81 mg  81 mg Oral Daily William Hatch MD   81 mg at 11/28/22 1105    benzonatate capsule 100 mg  100 mg Oral TID PRN Roger Davis NP        cefepime in dextrose 5 % 1 gram/50 mL IVPB 1 g  1 g Intravenous Q8H CARISA Lozano MD   Stopped at 11/29/22 0133    dextrose 10% bolus 125 mL  12.5 g Intravenous PRN William Hatch MD   Stopped at 11/17/22 0925    dextrose 10% bolus 125 mL  12.5 g Intravenous PRN Roger Davis NP        dextrose 10% bolus 250 mL  25 g Intravenous PRN William Hatch MD        dextrose 10% bolus 250 mL  25 g Intravenous PRN Roger Davis NP        digoxin tablet 125 mcg  125 mcg Oral Daily William Hatch MD   125 mcg at 11/28/22 1105    diltiaZEM 24 hr capsule 120 mg  120 mg Oral Q12H Shemar Dempsey MD   120 mg at 11/28/22 2022    ezetimibe tablet 10 mg  10 mg Oral Daily William Hatch MD   10 mg at 11/28/22 1105     folic acid tablet 1 mg  1 mg Oral Daily William Hatch MD   1 mg at 11/28/22 1113    glucagon (human recombinant) injection 1 mg  1 mg Intramuscular PRN Roger Davis NP        glucose chewable tablet 16 g  16 g Oral PRN Roger Davis NP        glucose chewable tablet 24 g  24 g Oral PRN Roger Davis NP        insulin aspart U-100 pen 1-10 Units  1-10 Units Subcutaneous QID (AC + HS) PRN Roger Davis NP   2 Units at 11/28/22 2030    insulin aspart U-100 pen 8 Units  8 Units Subcutaneous TIDWM Darby Obrien MD   8 Units at 11/28/22 1711    insulin detemir U-100 pen 38 Units  38 Units Subcutaneous Daily Darby Obrien MD   38 Units at 11/28/22 1106    losartan tablet 25 mg  25 mg Oral Q12H Shemar Dempsey MD   25 mg at 11/28/22 2022    metoprolol injection 5 mg  5 mg Intravenous Q4H PRN Laila Norton MD   5 mg at 11/27/22 0413    metoprolol tartrate (LOPRESSOR) tablet 50 mg  50 mg Oral Q12H Laila Norton MD   50 mg at 11/28/22 2022    metroNIDAZOLE tablet 500 mg  500 mg Oral Q8H Raj Christianson MD   500 mg at 11/29/22 0557    morphine injection 6 mg  6 mg Intravenous Q4H PRN Darby Obrien MD   6 mg at 11/29/22 0237    multivitamin tablet  1 tablet Oral Daily William Hatch MD   1 tablet at 11/28/22 1105    naloxone 0.4 mg/mL injection 0.02 mg  0.02 mg Intravenous PRN Roger Davis NP        nystatin 100,000 unit/mL suspension 500,000 Units  5 mL Oral QID (WM & HS) Darby Obrien MD   500,000 Units at 11/28/22 2022    ondansetron injection 4 mg  4 mg Intravenous Q8H PRN Roger Davis NP   4 mg at 11/29/22 0246    oxyCODONE immediate release tablet 15 mg  15 mg Oral Q3H PRN Darby Obrien MD   15 mg at 11/29/22 0557    oxyCODONE-acetaminophen 5-325 mg per tablet 1 tablet  1 tablet Oral Q4H PRN William Hatch MD   1 tablet at 11/27/22 1044    sodium chloride 0.9% flush 10 mL  10 mL Intravenous PRN William Hatch MD        torsemide tablet 20 mg  20 mg Oral  Daily Shemar Dempsey MD            Physical Exam    Temp:  [98 °F (36.7 °C)-98.7 °F (37.1 °C)]   Pulse:  []   Resp:  [16-20]   BP: (105-137)/(64-75)   SpO2:  [88 %-96 %]    Wt Readings from Last 3 Encounters:   11/25/22 114.1 kg (251 lb 8.7 oz)   11/14/22 110 kg (242 lb 9.6 oz)   11/02/22 111.4 kg (245 lb 11.2 oz)     Physical Exam  Constitutional:       General: He is not in acute distress.  Neck:      Vascular: JVD present.   Cardiovascular:      Rate and Rhythm: Normal rate and regular rhythm.      Heart sounds: S1 normal and S2 normal. No murmur heard.    No S3 or S4 sounds.   Pulmonary:      Effort: Pulmonary effort is normal.      Breath sounds: Normal breath sounds and air entry.   Abdominal:      General: Bowel sounds are normal.      Palpations: Abdomen is soft. There is no hepatomegaly.      Tenderness: There is no abdominal tenderness.   Musculoskeletal:      Right lower leg: No edema.      Left lower leg: No edema.   Skin:     Coloration: Skin is not pale.   Neurological:      Mental Status: He is alert.   Psychiatric:         Behavior: Behavior is cooperative.       Telemetry  Sinus rhythm    Labs  Recent Results (from the past 24 hour(s))   Magnesium    Collection Time: 11/29/22  4:05 AM   Result Value Ref Range    Magnesium 1.8 1.6 - 2.6 mg/dL   Phosphorus    Collection Time: 11/29/22  4:05 AM   Result Value Ref Range    Phosphorus 3.1 2.7 - 4.5 mg/dL   Basic Metabolic Panel    Collection Time: 11/29/22  4:05 AM   Result Value Ref Range    Sodium 133 (L) 136 - 145 mmol/L    Potassium 4.1 3.5 - 5.1 mmol/L    Chloride 95 95 - 110 mmol/L    CO2 32 (H) 23 - 29 mmol/L    Glucose 311 (H) 70 - 110 mg/dL    BUN 10 8 - 23 mg/dL    Creatinine 1.0 0.5 - 1.4 mg/dL    Calcium 8.6 (L) 8.7 - 10.5 mg/dL    Anion Gap 6 (L) 8 - 16 mmol/L    eGFR >60 >60 mL/min/1.73 m^2   CBC Auto Differential    Collection Time: 11/29/22  4:05 AM   Result Value Ref Range    WBC 9.04 3.90 - 12.70 K/uL    RBC 4.45 (L) 4.60 - 6.20  M/uL    Hemoglobin 14.4 14.0 - 18.0 g/dL    Hematocrit 44.8 40.0 - 54.0 %     (H) 82 - 98 fL    MCH 32.4 (H) 27.0 - 31.0 pg    MCHC 32.1 32.0 - 36.0 g/dL    RDW 12.4 11.5 - 14.5 %    Platelets 333 150 - 450 K/uL    MPV 11.4 9.2 - 12.9 fL    Immature Granulocytes 0.8 (H) 0.0 - 0.5 %    Gran # (ANC) 6.0 1.8 - 7.7 K/uL    Immature Grans (Abs) 0.07 (H) 0.00 - 0.04 K/uL    Lymph # 1.8 1.0 - 4.8 K/uL    Mono # 1.0 0.3 - 1.0 K/uL    Eos # 0.1 0.0 - 0.5 K/uL    Baso # 0.06 0.00 - 0.20 K/uL    nRBC 0 0 /100 WBC    Gran % 66.8 38.0 - 73.0 %    Lymph % 19.9 18.0 - 48.0 %    Mono % 10.5 4.0 - 15.0 %    Eosinophil % 1.3 0.0 - 8.0 %    Basophil % 0.7 0.0 - 1.9 %    Differential Method Automated              Shemar Dempsey MD

## 2022-11-29 NOTE — PT/OT/SLP PROGRESS
Occupational Therapy      Patient Name:  Nithin Wagner   MRN:  [id number]    Attempted to see pt at 11:05.  Upon arrival to room pt reported increased scrotal pain, and declined participation in therapy.  Pt stated he would probably be up for participating in therapy later in afternoon.  OT unable to attempt again this date.  Will follow up at next scheduled therapy session.    HI Huffman  11/29/2022

## 2022-11-29 NOTE — ASSESSMENT & PLAN NOTE
- Noted runs of SVT on monitor  - Recently diagnosed and treated with digoxin   - Return overnight of SVT   - Reconsult Cardiology, metoprolol added on 11/25  - patient with continued episodes of SVT, cardiology following and diltiazem added 11/28. Reduced losartan and lasix

## 2022-11-29 NOTE — PROGRESS NOTES
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        Thank you for your consult. I will follow-up with patient. Please contact us if you have any additional questions.    Javid Panchal MD  Infectious Disease  [facility]    Subjective:     Principal Problem:Sepsis    HPI: 67M h/o DM2, CAD, HTN, HLD presents with scrotal pain and swelling. Started about 4 weeks ago and has been progressively worsening despite a couple courses of antibiotics (most recently Bactrim). He reports fevers and chills at home. He also reports nausea, decreased PO intake, multiple syncopal episodes, and stool incontinence. He is on continuous blood glucose monitoring and states blood sugars have been in 190s today. He was seen PTA in urgent care and diagnosed with scrotal abscess with possible Mary's; he referred to the ER but waited to present. After taking a nap when getting his toothbrush, he woke up confused and in feces. He was hypotensive and tachycardic, on arrival in the ED. Sepsis protocol was started. Labs and imaging are noted c/w scrotal abscess and Mary's. Patient went to OR twice for debridement. Appropriate cultures were obtained and  grew Anaerococcus. The patient is currently on cefepime, vancomycin, and Flagyl. ID is consulted for abx recommendations for Mary's.        Interval History: Complains of significant pain in scrotum. Very concerned about changing dressings - lives alone and has significant pain.     Review of Systems   Constitutional:  Negative for fever.   Genitourinary:  Positive for scrotal swelling and testicular pain.   All other systems reviewed and are negative.  Objective:     Vital Signs (Most Recent):  Temp: 98.8 °F (37.1 °C) (11/29/22 1125)  Pulse: 82 (11/29/22 1200)  Resp: 16 (11/29/22 1219)  BP: 123/64 (11/29/22 1125)  SpO2: (!) 94 % (11/29/22 1125)   Vital Signs (24h Range):  Temp:  [97.5 °F (36.4 °C)-98.8 °F (37.1 °C)] 98.8 °F (37.1 °C)  Pulse:  [] 82  Resp:  [16-20] 16  SpO2:  [88 %-96 %] 94 %  BP: (105-137)/(64-85) 123/64     Weight: 114.1 kg (251 lb 8.7 oz)  Body mass index is 35.08 kg/m².    Estimated Creatinine Clearance: 89.5 mL/min (based on SCr of 1 mg/dL).    Physical Exam  Vitals and nursing note reviewed.   Constitutional:       General: He is not in acute distress.     Appearance: Normal appearance. He is not ill-appearing, toxic-appearing or diaphoretic.   HENT:      Head: Normocephalic and atraumatic.      Right Ear: External ear normal.      Left Ear: External ear normal.   Eyes:      Extraocular Movements: Extraocular movements intact.      Conjunctiva/sclera: Conjunctivae normal.      Pupils: Pupils are equal, round, and reactive to light.   Cardiovascular:      Rate and Rhythm: Normal rate and regular rhythm.   Pulmonary:      Breath sounds: No wheezing, rhonchi or rales.   Abdominal:      Tenderness: There is no guarding or rebound.   Genitourinary:     Comments: Erythema and tenderness around packed scrotal wound. No drainage.  Musculoskeletal:      Right lower leg: Edema present.      Left lower leg: Edema present.   Skin:     General: Skin is warm and dry.   Neurological:      General:  No focal deficit present.      Mental Status: He is alert and oriented to person, place, and time.   Psychiatric:         Mood and Affect: Mood normal.         Behavior: Behavior normal.         Thought Content: Thought content normal.         Judgment: Judgment normal.       Significant Labs: CBC:   Recent Labs   Lab 11/28/22 0440 11/29/22 0405   WBC 10.75 9.04   HGB 15.4 14.4   HCT 46.9 44.8    333     CMP:   Recent Labs   Lab 11/28/22 0440 11/29/22 0405    133*   K 4.4 4.1   CL 96 95   CO2 34* 32*   * 311*   BUN 9 10   CREATININE 1.0 1.0   CALCIUM 9.3 8.6*   ANIONGAP 7* 6*     Wound Culture:   Recent Labs   Lab 11/15/22  2139   LABAERO No growth       Significant Imaging: None

## 2022-11-30 ENCOUNTER — PATIENT OUTREACH (OUTPATIENT)
Dept: ADMINISTRATIVE | Facility: OTHER | Age: 67
End: 2022-11-30
Payer: MEDICARE

## 2022-11-30 LAB
ANION GAP SERPL CALC-SCNC: 8 MMOL/L (ref 8–16)
BASOPHILS # BLD AUTO: 0.08 K/UL (ref 0–0.2)
BASOPHILS NFR BLD: 0.9 % (ref 0–1.9)
BUN SERPL-MCNC: 11 MG/DL (ref 8–23)
CALCIUM SERPL-MCNC: 9.2 MG/DL (ref 8.7–10.5)
CHLORIDE SERPL-SCNC: 94 MMOL/L (ref 95–110)
CO2 SERPL-SCNC: 32 MMOL/L (ref 23–29)
CREAT SERPL-MCNC: 1 MG/DL (ref 0.5–1.4)
DIFFERENTIAL METHOD: ABNORMAL
EOSINOPHIL # BLD AUTO: 0.1 K/UL (ref 0–0.5)
EOSINOPHIL NFR BLD: 0.9 % (ref 0–8)
ERYTHROCYTE [DISTWIDTH] IN BLOOD BY AUTOMATED COUNT: 12.4 % (ref 11.5–14.5)
EST. GFR  (NO RACE VARIABLE): >60 ML/MIN/1.73 M^2
GLUCOSE SERPL-MCNC: 250 MG/DL (ref 70–110)
HCT VFR BLD AUTO: 44.1 % (ref 40–54)
HGB BLD-MCNC: 14.6 G/DL (ref 14–18)
IMM GRANULOCYTES # BLD AUTO: 0.09 K/UL (ref 0–0.04)
IMM GRANULOCYTES NFR BLD AUTO: 1 % (ref 0–0.5)
LYMPHOCYTES # BLD AUTO: 1.8 K/UL (ref 1–4.8)
LYMPHOCYTES NFR BLD: 19.6 % (ref 18–48)
MAGNESIUM SERPL-MCNC: 1.8 MG/DL (ref 1.6–2.6)
MCH RBC QN AUTO: 33.7 PG (ref 27–31)
MCHC RBC AUTO-ENTMCNC: 33.1 G/DL (ref 32–36)
MCV RBC AUTO: 102 FL (ref 82–98)
MONOCYTES # BLD AUTO: 1.2 K/UL (ref 0.3–1)
MONOCYTES NFR BLD: 13.1 % (ref 4–15)
NEUTROPHILS # BLD AUTO: 5.9 K/UL (ref 1.8–7.7)
NEUTROPHILS NFR BLD: 64.5 % (ref 38–73)
NRBC BLD-RTO: 0 /100 WBC
PHOSPHATE SERPL-MCNC: 3.5 MG/DL (ref 2.7–4.5)
PLATELET # BLD AUTO: 328 K/UL (ref 150–450)
PMV BLD AUTO: 12 FL (ref 9.2–12.9)
POTASSIUM SERPL-SCNC: 4.1 MMOL/L (ref 3.5–5.1)
RBC # BLD AUTO: 4.33 M/UL (ref 4.6–6.2)
SODIUM SERPL-SCNC: 134 MMOL/L (ref 136–145)
WBC # BLD AUTO: 9.13 K/UL (ref 3.9–12.7)

## 2022-11-30 PROCEDURE — 83735 ASSAY OF MAGNESIUM: CPT | Performed by: HOSPITALIST

## 2022-11-30 PROCEDURE — 25000003 PHARM REV CODE 250: Performed by: INTERNAL MEDICINE

## 2022-11-30 PROCEDURE — 25000003 PHARM REV CODE 250: Performed by: STUDENT IN AN ORGANIZED HEALTH CARE EDUCATION/TRAINING PROGRAM

## 2022-11-30 PROCEDURE — 99233 PR SUBSEQUENT HOSPITAL CARE,LEVL III: ICD-10-PCS | Mod: ,,, | Performed by: INTERNAL MEDICINE

## 2022-11-30 PROCEDURE — 99233 SBSQ HOSP IP/OBS HIGH 50: CPT | Mod: 95,,, | Performed by: STUDENT IN AN ORGANIZED HEALTH CARE EDUCATION/TRAINING PROGRAM

## 2022-11-30 PROCEDURE — 27000221 HC OXYGEN, UP TO 24 HOURS

## 2022-11-30 PROCEDURE — 97116 GAIT TRAINING THERAPY: CPT | Mod: CQ

## 2022-11-30 PROCEDURE — 63600175 PHARM REV CODE 636 W HCPCS: Performed by: HOSPITALIST

## 2022-11-30 PROCEDURE — 99900035 HC TECH TIME PER 15 MIN (STAT)

## 2022-11-30 PROCEDURE — 80048 BASIC METABOLIC PNL TOTAL CA: CPT | Performed by: HOSPITALIST

## 2022-11-30 PROCEDURE — 63600175 PHARM REV CODE 636 W HCPCS: Performed by: NURSE PRACTITIONER

## 2022-11-30 PROCEDURE — 99233 SBSQ HOSP IP/OBS HIGH 50: CPT | Mod: ,,, | Performed by: INTERNAL MEDICINE

## 2022-11-30 PROCEDURE — 97535 SELF CARE MNGMENT TRAINING: CPT

## 2022-11-30 PROCEDURE — 94761 N-INVAS EAR/PLS OXIMETRY MLT: CPT

## 2022-11-30 PROCEDURE — 25000003 PHARM REV CODE 250: Performed by: UROLOGY

## 2022-11-30 PROCEDURE — 99233 PR SUBSEQUENT HOSPITAL CARE,LEVL III: ICD-10-PCS | Mod: 95,,, | Performed by: STUDENT IN AN ORGANIZED HEALTH CARE EDUCATION/TRAINING PROGRAM

## 2022-11-30 PROCEDURE — 84100 ASSAY OF PHOSPHORUS: CPT | Performed by: HOSPITALIST

## 2022-11-30 PROCEDURE — 85025 COMPLETE CBC W/AUTO DIFF WBC: CPT | Performed by: HOSPITALIST

## 2022-11-30 PROCEDURE — 63600175 PHARM REV CODE 636 W HCPCS: Performed by: INTERNAL MEDICINE

## 2022-11-30 PROCEDURE — 21400001 HC TELEMETRY ROOM

## 2022-11-30 PROCEDURE — 36415 COLL VENOUS BLD VENIPUNCTURE: CPT | Performed by: HOSPITALIST

## 2022-11-30 PROCEDURE — 25000003 PHARM REV CODE 250: Performed by: HOSPITALIST

## 2022-11-30 RX ORDER — IPRATROPIUM BROMIDE AND ALBUTEROL SULFATE 2.5; .5 MG/3ML; MG/3ML
3 SOLUTION RESPIRATORY (INHALATION) EVERY 4 HOURS PRN
Status: CANCELLED | OUTPATIENT
Start: 2022-11-30

## 2022-11-30 RX ORDER — OXYCODONE AND ACETAMINOPHEN 5; 325 MG/1; MG/1
1 TABLET ORAL EVERY 4 HOURS PRN
Status: CANCELLED | OUTPATIENT
Start: 2022-11-30

## 2022-11-30 RX ORDER — NAPROXEN SODIUM 220 MG/1
81 TABLET, FILM COATED ORAL DAILY
Status: CANCELLED | OUTPATIENT
Start: 2022-12-01

## 2022-11-30 RX ORDER — ACETAMINOPHEN 325 MG/1
650 TABLET ORAL EVERY 4 HOURS PRN
Status: CANCELLED | OUTPATIENT
Start: 2022-11-30

## 2022-11-30 RX ORDER — EZETIMIBE 10 MG/1
10 TABLET ORAL DAILY
Status: CANCELLED | OUTPATIENT
Start: 2022-12-01

## 2022-11-30 RX ORDER — NYSTATIN 100000 [USP'U]/ML
5 SUSPENSION ORAL
Status: CANCELLED | OUTPATIENT
Start: 2022-11-30

## 2022-11-30 RX ORDER — DIGOXIN 125 MCG
125 TABLET ORAL DAILY
Status: CANCELLED | OUTPATIENT
Start: 2022-12-01

## 2022-11-30 RX ORDER — MORPHINE SULFATE 2 MG/ML
6 INJECTION, SOLUTION INTRAMUSCULAR; INTRAVENOUS EVERY 4 HOURS PRN
Status: CANCELLED | OUTPATIENT
Start: 2022-11-30

## 2022-11-30 RX ORDER — IBUPROFEN 200 MG
24 TABLET ORAL
Status: CANCELLED | OUTPATIENT
Start: 2022-11-30

## 2022-11-30 RX ORDER — ACETAMINOPHEN 325 MG/1
650 TABLET ORAL EVERY 6 HOURS PRN
Status: CANCELLED | OUTPATIENT
Start: 2022-11-30

## 2022-11-30 RX ORDER — METOPROLOL TARTRATE 1 MG/ML
5 INJECTION, SOLUTION INTRAVENOUS EVERY 4 HOURS PRN
Status: CANCELLED | OUTPATIENT
Start: 2022-11-30

## 2022-11-30 RX ORDER — DILTIAZEM HYDROCHLORIDE 120 MG/1
120 CAPSULE, COATED, EXTENDED RELEASE ORAL EVERY 12 HOURS
Status: CANCELLED | OUTPATIENT
Start: 2022-11-30

## 2022-11-30 RX ORDER — TORSEMIDE 20 MG/1
20 TABLET ORAL DAILY
Status: CANCELLED | OUTPATIENT
Start: 2022-12-01

## 2022-11-30 RX ORDER — GLUCAGON 1 MG
1 KIT INJECTION
Status: CANCELLED | OUTPATIENT
Start: 2022-11-30

## 2022-11-30 RX ORDER — IBUPROFEN 200 MG
16 TABLET ORAL
Status: CANCELLED | OUTPATIENT
Start: 2022-11-30

## 2022-11-30 RX ORDER — METOPROLOL TARTRATE 50 MG/1
50 TABLET ORAL EVERY 12 HOURS
Status: CANCELLED | OUTPATIENT
Start: 2022-11-30

## 2022-11-30 RX ORDER — ONDANSETRON 2 MG/ML
4 INJECTION INTRAMUSCULAR; INTRAVENOUS EVERY 8 HOURS PRN
Status: CANCELLED | OUTPATIENT
Start: 2022-11-30

## 2022-11-30 RX ORDER — OXYCODONE HYDROCHLORIDE 5 MG/1
15 TABLET ORAL
Status: CANCELLED | OUTPATIENT
Start: 2022-11-30

## 2022-11-30 RX ORDER — LOSARTAN POTASSIUM 25 MG/1
25 TABLET ORAL EVERY 12 HOURS
Status: CANCELLED | OUTPATIENT
Start: 2022-11-30

## 2022-11-30 RX ORDER — AMOXICILLIN 250 MG
1 CAPSULE ORAL 2 TIMES DAILY
Status: CANCELLED | OUTPATIENT
Start: 2022-11-30

## 2022-11-30 RX ORDER — FOLIC ACID 1 MG/1
1 TABLET ORAL DAILY
Status: CANCELLED | OUTPATIENT
Start: 2022-12-01

## 2022-11-30 RX ORDER — METRONIDAZOLE 500 MG/1
500 TABLET ORAL EVERY 8 HOURS
Status: CANCELLED | OUTPATIENT
Start: 2022-11-30

## 2022-11-30 RX ORDER — CALCIUM CARBONATE 200(500)MG
500 TABLET,CHEWABLE ORAL 2 TIMES DAILY PRN
Status: CANCELLED | OUTPATIENT
Start: 2022-11-30

## 2022-11-30 RX ORDER — INSULIN ASPART 100 [IU]/ML
8 INJECTION, SOLUTION INTRAVENOUS; SUBCUTANEOUS
Status: CANCELLED | OUTPATIENT
Start: 2022-11-30

## 2022-11-30 RX ORDER — BENZONATATE 100 MG/1
100 CAPSULE ORAL 3 TIMES DAILY PRN
Status: CANCELLED | OUTPATIENT
Start: 2022-11-30

## 2022-11-30 RX ORDER — SODIUM CHLORIDE 0.9 % (FLUSH) 0.9 %
10 SYRINGE (ML) INJECTION
Status: CANCELLED | OUTPATIENT
Start: 2022-11-30

## 2022-11-30 RX ORDER — CEFEPIME HYDROCHLORIDE 1 G/50ML
1 INJECTION, SOLUTION INTRAVENOUS
Status: CANCELLED | OUTPATIENT
Start: 2022-11-30

## 2022-11-30 RX ORDER — INSULIN ASPART 100 [IU]/ML
1-10 INJECTION, SOLUTION INTRAVENOUS; SUBCUTANEOUS
Status: CANCELLED | OUTPATIENT
Start: 2022-11-30

## 2022-11-30 RX ORDER — TALC
6 POWDER (GRAM) TOPICAL NIGHTLY PRN
Status: CANCELLED | OUTPATIENT
Start: 2022-11-30

## 2022-11-30 RX ORDER — NALOXONE HCL 0.4 MG/ML
0.02 VIAL (ML) INJECTION
Status: CANCELLED | OUTPATIENT
Start: 2022-11-30

## 2022-11-30 RX ORDER — SODIUM CHLORIDE 9 MG/ML
INJECTION, SOLUTION INTRAVENOUS
Status: CANCELLED | OUTPATIENT
Start: 2022-11-30

## 2022-11-30 RX ADMIN — MORPHINE SULFATE 6 MG: 2 INJECTION, SOLUTION INTRAMUSCULAR; INTRAVENOUS at 04:11

## 2022-11-30 RX ADMIN — NYSTATIN 500000 UNITS: 500000 SUSPENSION ORAL at 08:11

## 2022-11-30 RX ADMIN — THERA TABS 1 TABLET: TAB at 10:11

## 2022-11-30 RX ADMIN — ONDANSETRON 4 MG: 2 INJECTION INTRAMUSCULAR; INTRAVENOUS at 10:11

## 2022-11-30 RX ADMIN — FOLIC ACID 1 MG: 1 TABLET ORAL at 10:11

## 2022-11-30 RX ADMIN — MORPHINE SULFATE 6 MG: 2 INJECTION, SOLUTION INTRAMUSCULAR; INTRAVENOUS at 08:11

## 2022-11-30 RX ADMIN — CEFEPIME HYDROCHLORIDE 1 G: 1 INJECTION, SOLUTION INTRAVENOUS at 06:11

## 2022-11-30 RX ADMIN — TORSEMIDE 20 MG: 20 TABLET ORAL at 10:11

## 2022-11-30 RX ADMIN — METOPROLOL TARTRATE 50 MG: 50 TABLET, FILM COATED ORAL at 10:11

## 2022-11-30 RX ADMIN — DILTIAZEM HYDROCHLORIDE 120 MG: 120 CAPSULE, COATED, EXTENDED RELEASE ORAL at 10:11

## 2022-11-30 RX ADMIN — INSULIN ASPART 8 UNITS: 100 INJECTION, SOLUTION INTRAVENOUS; SUBCUTANEOUS at 10:11

## 2022-11-30 RX ADMIN — OXYCODONE 15 MG: 5 TABLET ORAL at 07:11

## 2022-11-30 RX ADMIN — NYSTATIN 500000 UNITS: 500000 SUSPENSION ORAL at 12:11

## 2022-11-30 RX ADMIN — OXYCODONE 15 MG: 5 TABLET ORAL at 10:11

## 2022-11-30 RX ADMIN — METRONIDAZOLE 500 MG: 500 TABLET ORAL at 09:11

## 2022-11-30 RX ADMIN — ASPIRIN 81 MG CHEWABLE TABLET 81 MG: 81 TABLET CHEWABLE at 10:11

## 2022-11-30 RX ADMIN — INSULIN ASPART 4 UNITS: 100 INJECTION, SOLUTION INTRAVENOUS; SUBCUTANEOUS at 10:11

## 2022-11-30 RX ADMIN — LOSARTAN POTASSIUM 25 MG: 25 TABLET, FILM COATED ORAL at 10:11

## 2022-11-30 RX ADMIN — EZETIMIBE 10 MG: 10 TABLET ORAL at 10:11

## 2022-11-30 RX ADMIN — NYSTATIN 500000 UNITS: 500000 SUSPENSION ORAL at 06:11

## 2022-11-30 RX ADMIN — INSULIN ASPART 8 UNITS: 100 INJECTION, SOLUTION INTRAVENOUS; SUBCUTANEOUS at 06:11

## 2022-11-30 RX ADMIN — CEFEPIME HYDROCHLORIDE 1 G: 1 INJECTION, SOLUTION INTRAVENOUS at 01:11

## 2022-11-30 RX ADMIN — METRONIDAZOLE 500 MG: 500 TABLET ORAL at 06:11

## 2022-11-30 RX ADMIN — MORPHINE SULFATE 6 MG: 2 INJECTION, SOLUTION INTRAMUSCULAR; INTRAVENOUS at 10:11

## 2022-11-30 RX ADMIN — CEFEPIME HYDROCHLORIDE 1 G: 1 INJECTION, SOLUTION INTRAVENOUS at 10:11

## 2022-11-30 RX ADMIN — SODIUM CHLORIDE: 0.9 INJECTION, SOLUTION INTRAVENOUS at 06:11

## 2022-11-30 RX ADMIN — LOSARTAN POTASSIUM 25 MG: 25 TABLET, FILM COATED ORAL at 08:11

## 2022-11-30 RX ADMIN — INSULIN ASPART 4 UNITS: 100 INJECTION, SOLUTION INTRAVENOUS; SUBCUTANEOUS at 08:11

## 2022-11-30 RX ADMIN — METRONIDAZOLE 500 MG: 500 TABLET ORAL at 04:11

## 2022-11-30 RX ADMIN — INSULIN DETEMIR 38 UNITS: 100 INJECTION, SOLUTION SUBCUTANEOUS at 10:11

## 2022-11-30 RX ADMIN — OXYCODONE 15 MG: 5 TABLET ORAL at 06:11

## 2022-11-30 RX ADMIN — INSULIN ASPART 2 UNITS: 100 INJECTION, SOLUTION INTRAVENOUS; SUBCUTANEOUS at 12:11

## 2022-11-30 RX ADMIN — OXYCODONE 15 MG: 5 TABLET ORAL at 12:11

## 2022-11-30 RX ADMIN — MORPHINE SULFATE 6 MG: 2 INJECTION, SOLUTION INTRAMUSCULAR; INTRAVENOUS at 02:11

## 2022-11-30 RX ADMIN — DILTIAZEM HYDROCHLORIDE 120 MG: 120 CAPSULE, COATED, EXTENDED RELEASE ORAL at 08:11

## 2022-11-30 RX ADMIN — INSULIN ASPART 2 UNITS: 100 INJECTION, SOLUTION INTRAVENOUS; SUBCUTANEOUS at 06:11

## 2022-11-30 RX ADMIN — NYSTATIN 500000 UNITS: 500000 SUSPENSION ORAL at 10:11

## 2022-11-30 RX ADMIN — METOPROLOL TARTRATE 50 MG: 50 TABLET, FILM COATED ORAL at 08:11

## 2022-11-30 RX ADMIN — ONDANSETRON 4 MG: 2 INJECTION INTRAMUSCULAR; INTRAVENOUS at 08:11

## 2022-11-30 RX ADMIN — INSULIN ASPART 8 UNITS: 100 INJECTION, SOLUTION INTRAVENOUS; SUBCUTANEOUS at 12:11

## 2022-11-30 RX ADMIN — DIGOXIN 125 MCG: 125 TABLET ORAL at 10:11

## 2022-11-30 RX ADMIN — SODIUM CHLORIDE: 0.9 INJECTION, SOLUTION INTRAVENOUS at 10:11

## 2022-11-30 NOTE — PLAN OF CARE
Problem: Occupational Therapy  Goal: Occupational Therapy Goal  Description: Goals to be met by: 11/28/2022     Patient will increase functional independence with ADLs by performing:    UE Dressing with [place]- MET 11/30/2022 .  LE Dressing with [place].- MET 11/30/2022   Grooming while standing at sink with [place]- MET 11/30/2022 .  Toileting from toilet with [place] for hygiene and clothing management.- MET 11/30/2022    Toilet transfer to toilet with [place].- MET 11/30/2022     Outcome: Met   Pt performing ADLS & mobility with indep; all established goals met with no further indications for OT services at this time. Recommend d/c home vs LTAC for medical mgmt.

## 2022-11-30 NOTE — ASSESSMENT & PLAN NOTE
Mary's Gangrene of Scrotum  Leukocytosis  Lactic acidosis  Elevated troponin  - Patient criteria for sepsis with infective focus of Mary gangrene of the scrotum  - Lactic acid level 4.4, procalcitonin >48, WBC 24, and elevated troponin levels that trended down  - Urology consulted and patient underwent excision and debridement of necrotic scrotal tissue on 11/15; repeat debridement 11/17  - Patient was on cefepime 1g IV q.12, clindamycin 900 mg IV Q 8, metronidazole 500 mg IV Q 8 and vancomycin dosed by pharmacy  - Elevated troponins likely secondary to sepsis and setting of DONNA given no reported chest pain and no acute EKG changes  - Blood cultures no growth to date and wound cultures with Anaerococcus and Prevotella species  - Wound care ordered and Urology following  - Clinically was improving with resolution of lactic acidosis   - Adjust pain medication regimen with addition of medication for breakthrough pain  - ID consult appreciated, and clindamycin and vancomycin stopped, and patient continued cefepime 1g IV q.12 and metronidazole 500 mg IV Q 8 on  11/21  - Ricketts removed on 11/21  - Urology performed beside debridement on 11/22  - Clinically worse on 11/23; repeat blood cultures NGTD, UA negative and CXR with edema  - Stopped IV fluids and restarted lasix  - Clinically improving, follow up studies and await sensitivities for final recommendation for antibiotic regimen by ID along with clearance from Urology that the patient is ready for discharge  - to d/c on IV ertapenem per ID recs, seeking LTAC placement due to BID wound care dressing changes and IVabx

## 2022-11-30 NOTE — PROGRESS NOTES
[facility] Medicine  Telemedicine Progress Note    Patient Name: Nithin Wagner  MRN: [id number]  Patient Class: IP- Inpatient   Admission Date: 11/15/2022  Length of Stay: 14 days  Attending Physician: Abelino Pollock MD  Primary Care Provider: Tushar Drew MD          Subjective:     Principal Problem:Sepsis        HPI:  The patient is a 67M with a past medical history of DM2, CAD, HTN, and HLD who presents with scrotal pain and swelling. Started about 4 weeks ago and has been progressively worsening despite a couple courses of antibiotics (unsure which ones). He reports fevers and chills at home, nausea, decreased PO intake, and multiple syncopal episodes with +incontinence of stool. Unable to urinate for the past 24 hours or so. Sugars 190s at home. He was seen yesterday in urgent care and referred to the ER, but says he wasn't able to make it at that time so he comes today instead. On initial workup, the patient has CT evidence of Fourniers gangrene.  He will be brought to the operating room emergently and admitted after to ICU for further management.      Overview/Hospital Course:  Mr. Wagner presented with scrotal pain and swelling.  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  Empiric initiated with cefepime, clindamycin, metronidazole, and vancomycin.  Urology consulted and he underwent emergent excision and debridement of necrotic tissue on 11/15. Repeat washout performed 11/17. Improved and stepped down from ICU 11/18.  Bedside debridement performed on 11/22.  ID consulted and antibiotics regimen changed to cefepime and Flagyl known.  Wound cultures with Anaerococcus and Prevotella species; awaiting sensitivities.  Renal function normalized.  Return of SVT and Cardiology consulted with start of metoprolol.  Awaiting wound culture sensitivities.      Interval History: patient doing well, ID recommending IV ertapenem upon discharge. Seeking LTAC due to BID wound  care dressing changes and IV abx. Furosemide changed to torsemide today.     Review of Systems   Constitutional:  Negative for chills and fever.   Respiratory:  Negative for cough and shortness of breath.    Cardiovascular:  Negative for chest pain.   Genitourinary:  Positive for scrotal swelling.   Objective:     Vital Signs (Most Recent):  Temp: 98.4 °F (36.9 °C) (11/29/22 2026)  Pulse: 90 (11/29/22 2026)  Resp: 18 (11/29/22 2029)  BP: 121/73 (11/29/22 2026)  SpO2: (!) 94 % (11/29/22 2026)   Vital Signs (24h Range):  Temp:  [97.5 °F (36.4 °C)-98.8 °F (37.1 °C)] 98.4 °F (36.9 °C)  Pulse:  [] 90  Resp:  [16-18] 18  SpO2:  [88 %-96 %] 94 %  BP: (115-128)/(64-85) 121/73     Weight: 114.1 kg (251 lb 8.7 oz)  Body mass index is 35.08 kg/m².    Intake/Output Summary (Last 24 hours) at 11/29/2022 2232  Last data filed at 11/29/2022 2029  Gross per 24 hour   Intake 760.2 ml   Output 825 ml   Net -64.8 ml        Physical Exam  Vitals reviewed.   Constitutional:       Appearance: Normal appearance.   HENT:      Head: Normocephalic and atraumatic.   Eyes:      General: No scleral icterus.     Conjunctiva/sclera: Conjunctivae normal.   Pulmonary:      Effort: Pulmonary effort is normal. No respiratory distress.   Skin:     Coloration: Skin is not jaundiced.      Findings: No erythema.   Neurological:      General: No focal deficit present.      Mental Status: He is alert and oriented to person, place, and time.   Psychiatric:         Mood and Affect: Mood normal.         Behavior: Behavior normal.       Significant Labs: All pertinent labs within the past 24 hours have been reviewed.    Significant Imaging: I have reviewed all pertinent imaging results/findings within the past 24 hours.      Assessment/Plan:      * Sepsis  Mary's Gangrene of Scrotum  Leukocytosis  Lactic acidosis  Elevated troponin  - Patient criteria for sepsis with infective focus of Mary gangrene of the scrotum  - Lactic acid level 4.4,  procalcitonin >48, WBC 24, and elevated troponin levels that trended down  - Urology consulted and patient underwent excision and debridement of necrotic scrotal tissue on 11/15; repeat debridement 11/17  - Patient was on cefepime 1g IV q.12, clindamycin 900 mg IV Q 8, metronidazole 500 mg IV Q 8 and vancomycin dosed by pharmacy  - Elevated troponins likely secondary to sepsis and setting of DONNA given no reported chest pain and no acute EKG changes  - Blood cultures no growth to date and wound cultures with Anaerococcus and Prevotella species  - Wound care ordered and Urology following  - Clinically was improving with resolution of lactic acidosis   - Adjust pain medication regimen with addition of medication for breakthrough pain  - ID consult appreciated, and clindamycin and vancomycin stopped, and patient continued cefepime 1g IV q.12 and metronidazole 500 mg IV Q 8 on  11/21  - Ricketts removed on 11/21  - Urology performed beside debridement on 11/22  - Clinically worse on 11/23; repeat blood cultures NGTD, UA negative and CXR with edema  - Stopped IV fluids and restarted lasix  - Clinically improving, follow up studies and await sensitivities for final recommendation for antibiotic regimen by ID along with clearance from Urology that the patient is ready for discharge  - to d/c on IV ertapenem per ID recs, seeking LTAC placement due to BID wound care dressing changes and IVabx    SVT (supraventricular tachycardia)  - Noted runs of SVT on monitor  - Recently diagnosed and treated with digoxin   - Return overnight of SVT   - Reconsult Cardiology, metoprolol added on 11/25  - patient with continued episodes of SVT, cardiology following and diltiazem added 11/28. Reduced losartan and lasix    Chronic diastolic heart failure  CAD  - Last echo with normal EF and grade 1 diastolic dysfunction on 10/28/2022  - Elevated troponin due to sepsis with associated hypotension along with DONNA and CHF which were trended down and  "no ectopy noted on telemetry, and no acute EKG changes  - Continue digoxin 125 mcg p.o. daily, ASA 81 mg p.o. daily and ezetimibe 10 mg p.o. daily  - Restarted Lasix on 11/24, changed to torsemide on 11/29 per cards recs  - Cardiology started metoprolol for SVT    DONNA (acute kidney injury)  - Creatinine 2.5, baseline- 1.3; likely due to hypotension/sepsis  - Improved with IV fluids and treatment of sepsis  - Creatinine down to normal levels  - Stopped IV fluids  - Continue monitor with repeat labs and follow I/O's    Hyperkalemia  - Potassium was 6.1 on admission with new DONNA  - Patient takes KCL supplementation daily along with aldactone  - Holding Aldactone and KCl supplementation  - Potassium level has normalized and has remained stable   - Continue to monitor    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  - Glucoses levels reviewed, not at targets   - Increase detemir to 38U subQ daily, and moderate-dose sliding scale insulin aspart 1-10U subQ TIDWM PRN, add Novolog 8U QAC  - Will adjust insulin regimen as needed to achieve glucose between 140 - 180    Essential hypertension  - Hypotensive was on admit that responded to treatment with IV fluids with no need for pressor support  - Blood pressure predominantly in normal range; monitor.  - Restarted BP medication, substitute with losartan 100 mg daily  - Cardiology added metoprolol      VTE Risk Mitigation (From admission, onward)         Ordered     IP VTE HIGH RISK PATIENT  Once         11/15/22 1948     Reason for No Pharmacological VTE Prophylaxis  Once        Question:  Reasons:  Answer:  Risk of Bleeding    11/15/22 1948     Place sequential compression device  Until discontinued         11/15/22 1936                      I have completed this tele-visit with the assistance of a telepresenter.    The attending portion of " this evaluation, treatment, and documentation was performed per Abelino Pollock MD via Telemedicine AudioVisual using the secure People to Remember software platform with 2 way audio/video. The provider was located off-site and the patient is located in the hospital. The aforementioned video software was utilized to document the relevant history and physical exam    Abelino Pollock MD  Department of Hospital Medicine   [facility] ([place])

## 2022-11-30 NOTE — SUBJECTIVE & OBJECTIVE
Interval History: Feels better - scrotum is feeling better    Review of Systems  Objective:     Vital Signs (Most Recent):  Temp: 98.3 °F (36.8 °C) (11/30/22 1144)  Pulse: 84 (11/30/22 1144)  Resp: 16 (11/30/22 1254)  BP: 104/65 (11/30/22 1144)  SpO2: (!) 94 % (11/30/22 1144)   Vital Signs (24h Range):  Temp:  [97.9 °F (36.6 °C)-98.9 °F (37.2 °C)] 98.3 °F (36.8 °C)  Pulse:  [] 84  Resp:  [16-18] 16  SpO2:  [94 %-96 %] 94 %  BP: (104-128)/(61-79) 104/65     Weight: 114.1 kg (251 lb 8.7 oz)  Body mass index is 35.08 kg/m².    Estimated Creatinine Clearance: 89.5 mL/min (based on SCr of 1 mg/dL).    Physical Exam  Vitals and nursing note reviewed.   Constitutional:       General: He is not in acute distress.     Appearance: Normal appearance. He is not ill-appearing, toxic-appearing or diaphoretic.   HENT:      Head: Normocephalic and atraumatic.      Right Ear: External ear normal.      Left Ear: External ear normal.   Eyes:      Extraocular Movements: Extraocular movements intact.      Conjunctiva/sclera: Conjunctivae normal.      Pupils: Pupils are equal, round, and reactive to light.   Cardiovascular:      Rate and Rhythm: Normal rate and regular rhythm.   Pulmonary:      Breath sounds: No wheezing, rhonchi or rales.   Abdominal:      General: Abdomen is flat. Bowel sounds are normal.      Palpations: Abdomen is soft.      Tenderness: There is no guarding or rebound.   Genitourinary:     Comments: Erythema and tenderness around packed scrotal wound. No drainage. Inner scrotum visible.  Musculoskeletal:      Right lower leg: Edema present.      Left lower leg: Edema present.   Skin:     General: Skin is warm and dry.   Neurological:      General: No focal deficit present.      Mental Status: He is alert and oriented to person, place, and time.   Psychiatric:         Mood and Affect: Mood normal.         Behavior: Behavior normal.         Thought Content: Thought content normal.         Judgment: Judgment  normal.       Significant Labs: CBC:   Recent Labs   Lab 11/29/22  0405 11/30/22  0406   WBC 9.04 9.13   HGB 14.4 14.6   HCT 44.8 44.1    328     CMP:   Recent Labs   Lab 11/29/22  0405 11/30/22  0406   * 134*   K 4.1 4.1   CL 95 94*   CO2 32* 32*   * 250*   BUN 10 11   CREATININE 1.0 1.0   CALCIUM 8.6* 9.2   ANIONGAP 6* 8       Significant Imaging: None

## 2022-11-30 NOTE — PROGRESS NOTES
[facility]    Admission date:  11/15/2022     Assessment    Chronic systolic heart failure   Compensated     Coronary atherosclerosis   Stable     Supraventricular tachycardia   Self-limited, asymptomatic episode this morning.    Hypertension   Adequate control    Plan and Discussion    He is anticipating a discharge to LTAC soon.  Cardiac status remains stable to do so.  Would discharge on his current regimen of cardiac medications.  Will follow-up and adjust in the outpatient setting as he convalesces from this acute illness.    Subjective    Feels better today.    Medications  Current Facility-Administered Medications   Medication Dose Route Frequency Provider Last Rate Last Admin    0.9%  NaCl infusion   Intravenous PRN Abelino Pollock MD 10 mL/hr at 11/29/22 1821 Rate Verify at 11/29/22 1821    acetaminophen tablet 650 mg  650 mg Oral Q4H PRN Roger Davis NP   650 mg at 11/28/22 0203    albuterol-ipratropium 2.5 mg-0.5 mg/3 mL nebulizer solution 3 mL  3 mL Nebulization Q4H PRN William Hatch MD   3 mL at 11/28/22 1920    aspirin chewable tablet 81 mg  81 mg Oral Daily William Hatch MD   81 mg at 11/29/22 0912    benzonatate capsule 100 mg  100 mg Oral TID PRN Roger Davis NP        cefepime in dextrose 5 % 1 gram/50 mL IVPB 1 g  1 g Intravenous Q8H CARISA Lozano MD   Stopped at 11/30/22 0225    dextrose 10% bolus 125 mL  12.5 g Intravenous PRN William Hatch MD   Stopped at 11/17/22 0925    dextrose 10% bolus 125 mL  12.5 g Intravenous PRN Roger Davis NP        dextrose 10% bolus 250 mL  25 g Intravenous PRN William Hatch MD        dextrose 10% bolus 250 mL  25 g Intravenous PRN Roger Davis NP        digoxin tablet 125 mcg  125 mcg Oral Daily William Hatch MD   125 mcg at 11/29/22 0912    diltiaZEM 24 hr capsule 120 mg  120 mg Oral Q12H Shemar Dempsey MD   120 mg at 11/29/22 2028    ezetimibe tablet 10 mg  10 mg Oral Daily William BOND  MD Mamie   10 mg at 11/29/22 0912    folic acid tablet 1 mg  1 mg Oral Daily William Hatch MD   1 mg at 11/29/22 0912    glucagon (human recombinant) injection 1 mg  1 mg Intramuscular PRN Roger Davis NP        glucose chewable tablet 16 g  16 g Oral PRN Roger Davis NP        glucose chewable tablet 24 g  24 g Oral PRN Roger Davis NP        insulin aspart U-100 pen 1-10 Units  1-10 Units Subcutaneous QID (AC + HS) PRN Roger Davis NP   4 Units at 11/29/22 1625    insulin aspart U-100 pen 8 Units  8 Units Subcutaneous TIDWM Darby Obrien MD   8 Units at 11/29/22 1625    insulin detemir U-100 pen 38 Units  38 Units Subcutaneous Daily Darby Obrien MD   38 Units at 11/29/22 0914    losartan tablet 25 mg  25 mg Oral Q12H Shemar Dempsey MD   25 mg at 11/29/22 2028    metoprolol injection 5 mg  5 mg Intravenous Q4H PRN Laila Norton MD   5 mg at 11/27/22 0413    metoprolol tartrate (LOPRESSOR) tablet 50 mg  50 mg Oral Q12H Laila Norton MD   50 mg at 11/29/22 2028    metroNIDAZOLE tablet 500 mg  500 mg Oral Q8H Raj Christianson MD   500 mg at 11/30/22 0607    morphine injection 6 mg  6 mg Intravenous Q4H PRN Darby Obrien MD   6 mg at 11/30/22 0242    multivitamin tablet  1 tablet Oral Daily William Hatch MD   1 tablet at 11/29/22 0912    naloxone 0.4 mg/mL injection 0.02 mg  0.02 mg Intravenous PRN Roger Davis NP        nystatin 100,000 unit/mL suspension 500,000 Units  5 mL Oral QID (WM & HS) Darby Obrien MD   500,000 Units at 11/29/22 2029    ondansetron injection 4 mg  4 mg Intravenous Q8H PRN Roger Davis NP   4 mg at 11/29/22 1624    oxyCODONE immediate release tablet 15 mg  15 mg Oral Q3H PRN Darby Obrien MD   15 mg at 11/30/22 0701    oxyCODONE-acetaminophen 5-325 mg per tablet 1 tablet  1 tablet Oral Q4H PRN William Hatch MD   1 tablet at 11/29/22 1219    sodium chloride 0.9% flush 10 mL  10 mL Intravenous PRN William Hatch MD         torsemide tablet 20 mg  20 mg Oral Daily Shemar Dempsey MD   20 mg at 11/29/22 0955        Physical Exam    Temp:  [97.9 °F (36.6 °C)-98.8 °F (37.1 °C)]   Pulse:  [78-98]   Resp:  [16-18]   BP: (115-128)/(61-79)   SpO2:  [94 %-96 %]    Wt Readings from Last 3 Encounters:   11/25/22 114.1 kg (251 lb 8.7 oz)   11/14/22 110 kg (242 lb 9.6 oz)   11/02/22 111.4 kg (245 lb 11.2 oz)     Physical Exam  Constitutional:       General: He is not in acute distress.  Neck:      Vascular: No JVD.   Cardiovascular:      Rate and Rhythm: Normal rate and regular rhythm.      Heart sounds: S1 normal and S2 normal. No murmur heard.    No S3 or S4 sounds.   Pulmonary:      Effort: Pulmonary effort is normal.      Breath sounds: Normal breath sounds and air entry.   Abdominal:      General: Bowel sounds are normal.      Palpations: Abdomen is soft. There is no hepatomegaly.      Tenderness: There is no abdominal tenderness.   Musculoskeletal:      Right lower leg: No edema.      Left lower leg: No edema.   Skin:     Coloration: Skin is not pale.   Neurological:      Mental Status: He is alert.   Psychiatric:         Behavior: Behavior is cooperative.       Telemetry  Sinus rhythm with brief SVT.    Labs  Recent Results (from the past 24 hour(s))   Magnesium    Collection Time: 11/30/22  4:06 AM   Result Value Ref Range    Magnesium 1.8 1.6 - 2.6 mg/dL   Phosphorus    Collection Time: 11/30/22  4:06 AM   Result Value Ref Range    Phosphorus 3.5 2.7 - 4.5 mg/dL   Basic Metabolic Panel    Collection Time: 11/30/22  4:06 AM   Result Value Ref Range    Sodium 134 (L) 136 - 145 mmol/L    Potassium 4.1 3.5 - 5.1 mmol/L    Chloride 94 (L) 95 - 110 mmol/L    CO2 32 (H) 23 - 29 mmol/L    Glucose 250 (H) 70 - 110 mg/dL    BUN 11 8 - 23 mg/dL    Creatinine 1.0 0.5 - 1.4 mg/dL    Calcium 9.2 8.7 - 10.5 mg/dL    Anion Gap 8 8 - 16 mmol/L    eGFR >60 >60 mL/min/1.73 m^2   CBC Auto Differential    Collection Time: 11/30/22  4:06 AM   Result Value Ref  Range    WBC 9.13 3.90 - 12.70 K/uL    RBC 4.33 (L) 4.60 - 6.20 M/uL    Hemoglobin 14.6 14.0 - 18.0 g/dL    Hematocrit 44.1 40.0 - 54.0 %     (H) 82 - 98 fL    MCH 33.7 (H) 27.0 - 31.0 pg    MCHC 33.1 32.0 - 36.0 g/dL    RDW 12.4 11.5 - 14.5 %    Platelets 328 150 - 450 K/uL    MPV 12.0 9.2 - 12.9 fL    Immature Granulocytes 1.0 (H) 0.0 - 0.5 %    Gran # (ANC) 5.9 1.8 - 7.7 K/uL    Immature Grans (Abs) 0.09 (H) 0.00 - 0.04 K/uL    Lymph # 1.8 1.0 - 4.8 K/uL    Mono # 1.2 (H) 0.3 - 1.0 K/uL    Eos # 0.1 0.0 - 0.5 K/uL    Baso # 0.08 0.00 - 0.20 K/uL    nRBC 0 0 /100 WBC    Gran % 64.5 38.0 - 73.0 %    Lymph % 19.6 18.0 - 48.0 %    Mono % 13.1 4.0 - 15.0 %    Eosinophil % 0.9 0.0 - 8.0 %    Basophil % 0.9 0.0 - 1.9 %    Differential Method Automated                Shemar Dempsey MD

## 2022-11-30 NOTE — ASSESSMENT & PLAN NOTE
66 yo male who presented with Mary's gangrene of scrotum.  Clinically stable    - Admitted to hospital medicine, appreciate assistance  - Debrided 11/15 and 11/17 in OR. Medial aspect of wound debrided at bedside 11/22.  - Voiding spontaneously  - Diet, insulin and pain control per primary    - Continue antibiotics per infectious disease recs. On cefepime/flagyl inpatient, plan for IV ertapenem outpatient.  - Anaerobic culture with Anaerococcus murdochii and Prevotella bucchalis.  - Dressing changes BID/ RN staff. Wound care following.  - Rest of care per primary

## 2022-11-30 NOTE — ASSESSMENT & PLAN NOTE
66 yo man with RA admitted with scrotal abscess and Mary's after suffering a leg infection (left thigh) and failing outpatient abx  - s/p debridement x 3  - culture only grew Anaerococcus and Prevotella (though patient was on abx prior to admission)    - having significant pain with walking, dressing changes  - continue wound care - lives alone    - Continue cefepime and flagyl. Awaiting LTAC for IV antibiotics, wound care, and pain control.

## 2022-11-30 NOTE — PT/OT/SLP PROGRESS
Physical Therapy Treatment    Patient Name:  Nithin Wagner   MRN:  [id number]    Recommendations:     Discharge Recommendations:  home (though will defer to medical team for medical needs)  Discharge Equipment Recommendations: rollator   Barriers to discharge: Decreased caregiver support    Assessment:     Nithin Wagner is a 67 y.o. male admitted with a medical diagnosis of Sepsis.  He presents with the following impairments/functional limitations:  weakness, impaired endurance, impaired self care skills, impaired functional mobility, gait instability, decreased lower extremity function, pain, impaired skin, edema, impaired cardiopulmonary response to activity ;pt with good mobility today, perf'd 6 min walk test w/ resp therapist present, pt O2:85% on RA, pt needed to sit and rest, ret'd O2@3L for second gt trail..    Rehab Prognosis: Good; patient would benefit from acute skilled PT services to address these deficits and reach maximum level of function.    Recent Surgery: Procedure(s) (LRB):  INCISION AND DRAINAGE, SCROTUM (N/A) 13 Days Post-Op    Plan:     During this hospitalization, patient to be seen 3 x/week to address the identified rehab impairments via gait training, therapeutic activities, therapeutic exercises, neuromuscular re-education and progress toward the following goals:    Plan of Care Expires:  12/21/22    Subjective     Chief Complaint: pain in scrotum following amb.  Patient/Family Comments/goals: pt agreeable to session.   Pain/Comfort:  Pain Rating 1: 4/10 (at rest)  Location - Orientation 1: generalized  Location 1: scrotum  Pain Addressed 1: Pre-medicate for activity, Reposition, Distraction  Pain Rating Post-Intervention 1: 6/10 (after amb)      Objective:     Communicated with nurse prior to session.  Patient found HOB elevated with telemetry, PICC line, oxygen upon PT entry to room.     General Precautions: Standard, fall, diabetic   Orthopedic Precautions:N/A   Braces: N/A  Respiratory  Status: Nasal cannula, flow 3 L/min     Functional Mobility:  Bed Mobility:     Supine to Sit: modified independence  Sit to Supine: modified independence  Transfers:     Sit to Stand:  modified independence and supervision with 4 wheeled walker  Gait: amb'd 100' x 2 w/ rollator and SBA, O2 on RA:85%, ret'd  O2@3L:92% w/amb.      AM-PAC 6 CLICK MOBILITY  Turning over in bed (including adjusting bedclothes, sheets and blankets)?: 4  Sitting down on and standing up from a chair with arms (e.g., wheelchair, bedside commode, etc.): 4  Moving from lying on back to sitting on the side of the bed?: 4  Moving to and from a bed to a chair (including a wheelchair)?: 3  Need to walk in hospital room?: 3  Climbing 3-5 steps with a railing?: 3  Basic Mobility Total Score: 21       Treatment & Education:  Pt given cueing for breathing (not talking) during walk test.     Patient left HOB elevated with all lines intact, call button in reach, and nurse notified..    GOALS:   Multidisciplinary Problems       Physical Therapy Goals          Problem: Physical Therapy    Goal Priority Disciplines Outcome Goal Variances Interventions   Physical Therapy Goal     PT, PT/OT Ongoing, Progressing     Description: Goals to be met by: 22    Patient will increase functional independence with mobility by performin. Supine<>sit with SPV and 1 UE if needed.   2. Sit<>stand with LRAD with SPV.  3. Gait x 100 feet with LRAD with SBA.  4. Ascend/descend 10 step(s) with least restrictive assistive device and SBA.                        Time Tracking:     PT Received On: 22  PT Start Time: 1112     PT Stop Time: 1135  PT Total Time (min): 23 min     Billable Minutes: Gait Training 23    Treatment Type: Treatment  PT/PTA: PTA     PTA Visit Number: 3     2022

## 2022-11-30 NOTE — NURSING
Dx change completed. Pain medication was administered.Pt tolerated it well. Will continue with care

## 2022-11-30 NOTE — SUBJECTIVE & OBJECTIVE
Interval History: No acute events overnight. Dressing changed by nursing this AM. Feels well, without complaints. Afebrile, vital signs stable.    Objective:     Temp:  [97.5 °F (36.4 °C)-98.8 °F (37.1 °C)] 98.2 °F (36.8 °C)  Pulse:  [] 82  Resp:  [16-18] 16  SpO2:  [92 %-96 %] 95 %  BP: (115-128)/(61-85) 127/61     Body mass index is 35.08 kg/m².           Drains       None                   Physical Exam  Vitals and nursing note reviewed.   Constitutional:       General: He is not in acute distress.     Appearance: Normal appearance. He is obese. He is not toxic-appearing.   HENT:      Nose: Nose normal.      Mouth/Throat:      Mouth: Mucous membranes are moist.   Eyes:      Pupils: Pupils are equal, round, and reactive to light.   Cardiovascular:      Rate and Rhythm: Normal rate.   Abdominal:      General: Abdomen is flat. There is no distension.      Tenderness: There is no abdominal tenderness.   Genitourinary:     Comments: Scrotal wound with no surrounding fluctuance, induration, crepitus. Wound edges appear healthy and viable.  Skin:     General: Skin is warm and dry.   Neurological:      General: No focal deficit present.      Mental Status: He is alert and oriented to person, place, and time.   Psychiatric:         Mood and Affect: Mood normal.         Behavior: Behavior normal.         Thought Content: Thought content normal.         Judgment: Judgment normal.       Significant Labs:    BMP:  Recent Labs   Lab 11/28/22 0440 11/29/22 0405 11/30/22 0406    133* 134*   K 4.4 4.1 4.1   CL 96 95 94*   CO2 34* 32* 32*   BUN 9 10 11   CREATININE 1.0 1.0 1.0   CALCIUM 9.3 8.6* 9.2       CBC:   Recent Labs   Lab 11/28/22 0440 11/29/22 0405 11/30/22  0406   WBC 10.75 9.04 9.13   HGB 15.4 14.4 14.6   HCT 46.9 44.8 44.1    333 328       All pertinent labs results from the past 24 hours have been reviewed.    Significant Imaging:  All pertinent imaging results/findings from the past 24 hours  have been reviewed.

## 2022-11-30 NOTE — PROGRESS NOTES
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 Awaiting LTAC for IV antibiotics, wound care, and pain control.      Thank you for your consult. I will follow-up with patient. Please contact us if you have any additional questions.    Javid Panchal MD  Infectious Disease  [facility] ([place])    Subjective:     Principal Problem:Sepsis    HPI: 67M h/o DM2, CAD, HTN, HLD presents with scrotal pain and swelling. Started about 4 weeks ago and has been progressively worsening despite a couple courses of antibiotics (most recently Bactrim). He reports fevers and chills at home. He also reports nausea, decreased PO intake, multiple syncopal episodes, and stool incontinence. He is on continuous blood glucose monitoring and states blood sugars have been in 190s today. He was seen PTA in urgent care and diagnosed with scrotal abscess with possible Mary's; he referred to the ER but waited to present. After taking a nap when getting his toothbrush, he woke up confused and in feces. He was hypotensive and tachycardic, on arrival in the ED. Sepsis protocol was started. Labs and imaging are noted c/w scrotal abscess and Mary's. Patient went to OR twice for debridement. Appropriate cultures were obtained and grew Anaerococcus. The patient is  currently on cefepime, vancomycin, and Flagyl. ID is consulted for abx recommendations for Mary's.        Interval History: Feels better - scrotum is feeling better    Review of Systems  Objective:     Vital Signs (Most Recent):  Temp: 98.3 °F (36.8 °C) (11/30/22 1144)  Pulse: 84 (11/30/22 1144)  Resp: 16 (11/30/22 1254)  BP: 104/65 (11/30/22 1144)  SpO2: (!) 94 % (11/30/22 1144)   Vital Signs (24h Range):  Temp:  [97.9 °F (36.6 °C)-98.9 °F (37.2 °C)] 98.3 °F (36.8 °C)  Pulse:  [] 84  Resp:  [16-18] 16  SpO2:  [94 %-96 %] 94 %  BP: (104-128)/(61-79) 104/65     Weight: 114.1 kg (251 lb 8.7 oz)  Body mass index is 35.08 kg/m².    Estimated Creatinine Clearance: 89.5 mL/min (based on SCr of 1 mg/dL).    Physical Exam  Vitals and nursing note reviewed.   Constitutional:       General: He is not in acute distress.     Appearance: Normal appearance. He is not ill-appearing, toxic-appearing or diaphoretic.   HENT:      Head: Normocephalic and atraumatic.      Right Ear: External ear normal.      Left Ear: External ear normal.   Eyes:      Extraocular Movements: Extraocular movements intact.      Conjunctiva/sclera: Conjunctivae normal.      Pupils: Pupils are equal, round, and reactive to light.   Cardiovascular:      Rate and Rhythm: Normal rate and regular rhythm.   Pulmonary:      Breath sounds: No wheezing, rhonchi or rales.   Abdominal:      General: Abdomen is flat. Bowel sounds are normal.      Palpations: Abdomen is soft.      Tenderness: There is no guarding or rebound.   Genitourinary:     Comments: Erythema and tenderness around packed scrotal wound. No drainage. Inner scrotum visible.  Musculoskeletal:      Right lower leg: Edema present.      Left lower leg: Edema present.   Skin:     General: Skin is warm and dry.   Neurological:      General: No focal deficit present.      Mental Status: He is alert and oriented to person, place, and time.   Psychiatric:         Mood and Affect: Mood normal.          Behavior: Behavior normal.         Thought Content: Thought content normal.         Judgment: Judgment normal.       Significant Labs: CBC:   Recent Labs   Lab 11/29/22  0405 11/30/22  0406   WBC 9.04 9.13   HGB 14.4 14.6   HCT 44.8 44.1    328     CMP:   Recent Labs   Lab 11/29/22 0405 11/30/22 0406   * 134*   K 4.1 4.1   CL 95 94*   CO2 32* 32*   * 250*   BUN 10 11   CREATININE 1.0 1.0   CALCIUM 8.6* 9.2   ANIONGAP 6* 8       Significant Imaging: None

## 2022-11-30 NOTE — ASSESSMENT & PLAN NOTE
CAD  - Last echo with normal EF and grade 1 diastolic dysfunction on 10/28/2022  - Elevated troponin due to sepsis with associated hypotension along with DONNA and CHF which were trended down and no ectopy noted on telemetry, and no acute EKG changes  - Continue digoxin 125 mcg p.o. daily, ASA 81 mg p.o. daily and ezetimibe 10 mg p.o. daily  - Restarted Lasix on 11/24, changed to torsemide on 11/29 per cards recs  - Cardiology started metoprolol for SVT

## 2022-11-30 NOTE — PROGRESS NOTES
[facility] ([place])  Adult Nutrition  Progress Note    SUMMARY       Recommendations  Continue on diabetic, 2000kcal diet  Encourage PO intake at meals  Consider the ADDITION of carson to promote wound healing  Monitor nutrition related labs    Goals:   Pt to consume at least 75% PO intake by RD follow up  Monitored labs to trend toward target ranges    Nutrition Goal Status: new  Communication of RD Recs:  (poc)    Assessment and Plan  Nutrition Problem  Increased protein intake    Related to (etiology):   Wound healing    Signs and Symptoms (as evidenced by):   Wound debridement of necrotic tissue, scrotum  Altered skin integrity of L leg, elbow    Interventions/Recommendations (treatment strategy):  Promod  Collaboration with other providers    Nutrition Diagnosis Status:   Continues     Reason for Assessment  Reason For Assessment: RD follow up  Diagnosis: infection/sepsis  Relevant Medical History:   Patient Active Problem List   Diagnosis    Hepatitis B core antibody positive    NAFLD (nonalcoholic fatty liver disease)    Essential hypertension    Systolic heart failure    Type 2 diabetes mellitus with hyperglycemia, with long-term current use of insulin    Class 1 obesity due to excess calories with serious comorbidity and body mass index (BMI) of 34.0 to 34.9 in adult    Smoker unmotivated to quit    Polycythemia, secondary    Canker sores oral    Coronary artery disease    Chronic use of opiate drug for therapeutic purpose    Rheumatoid arthritis flare    Fatigue    Sepsis    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 Felipe 20. [facility]-MIRNA RD remote  Nutrition Discharge Planning: pam  "on diabetic diet, [address] [place] [address]    Nutrition Risk Screen  Nutrition Risk Screen: large or nonhealing wound, burn or pressure injury    Nutrition/Diet History  Spiritual, Cultural Beliefs, Mormonism Practices, Values that Affect Care: no  Factors Affecting Nutritional Intake: decreased appetite    Anthropometrics  Temp: 98.9 °F (37.2 °C)  Height Method: Stated  Height: 5' 11" (180.3 cm)  Height (inches): 71 in  Weight Method: Bed Scale  Weight: 114.1 kg (251 lb 8.7 oz)  Weight (lb): 251.55 lb  Ideal Body Weight (IBW), Male: 172 lb  % Ideal Body Weight, Male (lb): 146.25 %  BMI (Calculated): 35.1  BMI Grade: 35 - 39.9 - obesity - grade II       Lab/Procedures/Meds  Pertinent Labs Reviewed: reviewed  CBC:  Recent Labs   Lab 11/30/22  0406   WBC 9.13   HGB 14.6   HCT 44.1        CMP:  Recent Labs   Lab 11/30/22  0406   CALCIUM 9.2   *   K 4.1   CO2 32*   CL 94*   BUN 11   CREATININE 1.0     Pertinent Medications Reviewed: reviewed  Scheduled Meds:   aspirin  81 mg Oral Daily    ceFEPime (MAXIPIME) IVPB  1 g Intravenous Q8H    digoxin  125 mcg Oral Daily    diltiaZEM  120 mg Oral Q12H    ezetimibe  10 mg Oral Daily    folic acid  1 mg Oral Daily    insulin aspart U-100  8 Units Subcutaneous TIDWM    insulin detemir U-100  38 Units Subcutaneous Daily    losartan  25 mg Oral Q12H    metoprolol tartrate  50 mg Oral Q12H    metroNIDAZOLE  500 mg Oral Q8H    multivitamin  1 tablet Oral Daily    nystatin  5 mL Oral QID (WM & HS)    torsemide  20 mg Oral Daily     Continuous Infusions:  PRN Meds:.sodium chloride 0.9%, acetaminophen, albuterol-ipratropium, benzonatate, dextrose 10%, dextrose 10%, dextrose 10%, dextrose 10%, glucagon (human recombinant), glucose, glucose, insulin aspart U-100, metoprolol, morphine, naloxone, ondansetron, oxyCODONE, oxyCODONE-acetaminophen, sodium chloride 0.9%      Estimated/Assessed Needs  Weight Used For Calorie Calculations: 114.1 kg (251 lb 8.7 oz)  Energy Calorie Requirements (kcal): " 1928  Energy Need Method: [place]-St Jeor (No PA)  Protein Requirements: 114 (1.0g/kg)  Weight Used For Protein Calculations: 114.1 kg (251 lb 8.7 oz)  Fluid Requirements (mL): 1mL/kcal  Estimated Fluid Requirement Method:  (or per MD)  RDA Method (mL): 1928  CHO Requirement: 250g (2000kcal)    Nutrition Prescription Ordered  Current Diet Order: diabetic diet, 2000kcal    Evaluation of Received Nutrient/Fluid Intake  I/O: -15L since admit  Energy Calories Required: meeting needs  Protein Required: meeting needs  Fluid Required: meeting needs  Comments: LBM 11/24  Tolerance: tolerating  % Intake of Estimated Energy Needs: 50 - 75 %  % Meal Intake: 50 - 75 %  Intake/Output - Last 3 Shifts         11/28 0700 11/29 0659 11/29 0700 11/30 0659 11/30 0700  12/01 0659    P.O.  118     I.V. (mL/kg)  109.8 (1)     IV Piggyback  582.4     Total Intake(mL/kg)  810.2 (7.1)     Urine (mL/kg/hr) 1025 (0.4) 1250 (0.5)     Emesis/NG output  0     Other       Stool  0     Blood  0     Total Output 1025 1250     Net -1025 -439.8            Urine Occurrence 1 x            Nutrition Risk  Level of Risk/Frequency of Follow-up:  (1-2x/week)     Monitor and Evaluation  Food and Nutrient Intake: energy intake, food and beverage intake  Food and Nutrient Adminstration: diet order  Knowledge/Beliefs/Attitudes: food and nutrition knowledge/skill, beliefs and attitudes  Physical Activity and Function: nutrition-related ADLs and IADLs, factors affecting access to physical activity  Anthropometric Measurements: weight, weight change, body mass index  Biochemical Data, Medical Tests and Procedures: electrolyte and renal panel, gastrointestinal profile, glucose/endocrine profile, inflammatory profile, lipid profile  Nutrition-Focused Physical Findings: overall appearance     Nutrition Follow-Up  RD Follow-up?: Yes    Radha Quintero, Registration Eligible, Provisional LDN

## 2022-11-30 NOTE — PT/OT/SLP PROGRESS
"Occupational Therapy   Treatment  Discharge Summary    Name: Nithin Wagner  MRN: [id number]  Admitting Diagnosis:  Sepsis  13 Days Post-Op    Recommendations:     Discharge Recommendations: home  Discharge Equipment Recommendations:  none  Barriers to discharge:  None    Assessment:     Nithin Wagner is a 67 y.o. male with a medical diagnosis of Sepsis.  He presents agreeable to therapy .      Pt performing ADLS & mobility with indep; all established goals met with no further indications for OT services at this time. Recommend d/c home vs LTAC for medical mgmt.     Rehab Prognosis:  Good; patient would benefit from acute skilled OT services to address these deficits and reach maximum level of function.       Plan:     Patient d/c from OT services in acute setting.     Subjective     Pain/Comfort:  Pain Rating 1: 0/10  Pain Rating Post-Intervention 1: 0/10    Objective:     Communicated with: BE [place] prior to session.  Patient found HOB elevated with telemetry, PICC line, oxygen upon OT entry to room.    General Precautions: Standard, fall, diabetic   Orthopedic Precautions:N/A   Braces: N/A  Respiratory Status: Nasal cannula, flow 3 L/min     Occupational Performance:     Bed Mobility:    Patient completed Supine to Sit with independence  Patient completed Sit to Supine with independence     Functional Mobility/Transfers:  Patient completed Sit <> Stand Transfer with independence  with  no assistive device   Patient completed Toilet Transfer Step Transfer technique with independence with  no AD  Functional Mobility: indep no AD within room  Performed mobility on RA per pt request stating "I dont drop that fast"   No visible SOB; pulse ox taken after activity with O2 sats 86%, RR 25bpm     Activities of Daily Living:  Grooming: independence oral care & washing hands standing   Upper Body Dressing: independence don/doff gown seated   Lower Body Dressing: independence don socks EOB   Toileting: independence per pt " report; DNT during session with no indications of difficulty       AMPAC 6 Click ADL: 24    Treatment & Education:  Pt participated in OT tx session with ADLS & functional mobility performed as documented above.  Education provided on role of OT including safe performance of self-care tasks, mobility techniques, safe use of DME, and encouragement of mobilization during hospitalization to prevent/limit deconditioning as well as discharge recommendations     Patient left HOB elevated with all lines intact, call button in reach, and RN notified    GOALS:   Multidisciplinary Problems       Occupational Therapy Goals       Not on file              Multidisciplinary Problems (Resolved)          Problem: Occupational Therapy    Goal Priority Disciplines Outcome Interventions   Occupational Therapy Goal   (Resolved)     OT, PT/OT Met    Description: Goals to be met by: 11/28/2022     Patient will increase functional independence with ADLs by performing:    UE Dressing with Lares- MET 11/30/2022 .  LE Dressing with Lares.- MET 11/30/2022   Grooming while standing at sink with Lares- MET 11/30/2022 .  Toileting from toilet with Lares for hygiene and clothing management.- MET 11/30/2022    Toilet transfer to toilet with Lares.- MET 11/30/2022                          Time Tracking:     OT Date of Treatment: 11/30/22  OT Start Time: 1452  OT Stop Time: 1515  OT Total Time (min): 23 min    Billable Minutes:Self Care/Home Management 23    OT/MARS: OT          11/30/2022

## 2022-11-30 NOTE — PROCEDURES
"Instructions for measuring Oxygen Saturation  to qualify for Home Oxygen:     Please obtain and document (REPLACE # [email or web address] AND COPY AND PASTE TEXT INSIDE QUOTATION MARKS) the following for Home Oxygen:     This must be performed and documented within 2 days of discharge.     "Pulse Oximetry:   90% SpO2 on room air at rest on 11/30/2022                                If 88% or less, STOP and document.     If 89% or more, measure and  document the following:     "Pulse Oximetry:   90% SpO2 on room air at rest on 11/30/2022                            87% SpO2 on room air with activity/exercise on 11/230/2022                        94%   SpO2 on 3L oxygen with activity/excercise on 11/30/2022     (NOTE:  FOR OXYGEN WITH ACTIVITY - MEDICARE WANTS TO SEE THAT THE OXYGEN INCREASES ONCE A PATIENT HAS WALKED AND IS BACK ON THE OXYGEN)    "
(No note.)  
Completed given to RN  
EKG done and given to RN.  
[facility]  Procedure Note     Date:  11/22/2022     Pre-Procedure Diagnosis:  1. Mary's gangrene     Post-Procedure Diagnosis: same     Procedure(s) Performed:   1. Debridement of necrotic scrotal tissue      Specimen(s): none     Performing physician(s): Jarred Zamora MD; Ryley Celaya MD     Anesthesia: 1% lidocaine w/o epinephrine     Estimated Blood Loss: min     Drains: none     Procedure in Detail:  After risks, benefits, and possible complications were explained, the patient elected to undergo the procedure and verbal consent was obtained at the bedside. All questions were answered.    The patient had necrotic eschar on the medial aspect of his scrotal wound. The skin underlying these areas was instilled with 1% lidocaine without epinephrine. We then sharply debrided this tissue until healthy bleeding skin edge was met. There remainder of the wound appeared viable with granulation tissue within the wound bed. The wound was packed with 1 roll of wet-to-dry kerlix. Then, 4x4's, an abd pad, and mesh underwear were then applied.    The patient tolerated the procedure well.    Jarred Zamora MD  
22-Dec-2021

## 2022-11-30 NOTE — PLAN OF CARE
[facility] submitted authorization; awaiting approval   11/30/22 1243   Post-Acute Status   Post-Acute Authorization Placement   Post-Acute Placement Status Pending payor review/awaiting authorization (if required)   Discharge Delays (!) Post-Acute Set-up   Discharge Plan   Discharge Plan A Rehab   Discharge Plan B Skilled Nursing Facility

## 2022-11-30 NOTE — PROGRESS NOTES
[facility] ([place])  Urology  Progress Note    Patient Name: Nithin Wagner  MRN: [id number]  Admission Date: 11/15/2022  Hospital Length of Stay: 15 days  Code Status: Full Code   Attending Provider: Abelino Pollock MD   Primary Care Physician: Tushar Drew MD    Subjective:     HPI:  67M h/o DM2, CAD, HTN, HLD presents with scrotal pain and swelling. Started about 4 weeks ago and has been progressively worsening despite a couple courses of antibiotics (unsure which ones). He reports fevers and chills at home. He also reports nausea, decreased PO intake, multiple syncopal episodes, and stool incontinence. He is on continuous blood glucose monitoring and states blood sugars have been in 190s today. He was seen yesterday in urgent care and diagnosed with scrotal abscess with possible kami's; he referred to the ER but waited until today to come in.    He was hypotensive and tachycardic on arrival in the ED. Sepsis protocol was started. Labs and imaging are currently pending.         Interval History: No acute events overnight. Dressing changed by nursing this AM. Feels well, without complaints. Afebrile, vital signs stable.    Objective:     Temp:  [97.5 °F (36.4 °C)-98.8 °F (37.1 °C)] 98.2 °F (36.8 °C)  Pulse:  [] 82  Resp:  [16-18] 16  SpO2:  [92 %-96 %] 95 %  BP: (115-128)/(61-85) 127/61     Body mass index is 35.08 kg/m².           Drains       None                   Physical Exam  Vitals and nursing note reviewed.   Constitutional:       General: He is not in acute distress.     Appearance: Normal appearance. He is obese. He is not toxic-appearing.   HENT:      Nose: Nose normal.      Mouth/Throat:      Mouth: Mucous membranes are moist.   Eyes:      Pupils: Pupils are equal, round, and reactive to light.   Cardiovascular:      Rate and Rhythm: Normal rate.   Abdominal:      General: Abdomen is flat. There is no distension.      Tenderness: There is no abdominal tenderness.   Genitourinary:      Comments: Scrotal wound with no surrounding fluctuance, induration, crepitus. Wound edges appear healthy and viable.  Skin:     General: Skin is warm and dry.   Neurological:      General: No focal deficit present.      Mental Status: He is alert and oriented to person, place, and time.   Psychiatric:         Mood and Affect: Mood normal.         Behavior: Behavior normal.         Thought Content: Thought content normal.         Judgment: Judgment normal.       Significant Labs:    BMP:  Recent Labs   Lab 11/28/22 0440 11/29/22 0405 11/30/22 0406    133* 134*   K 4.4 4.1 4.1   CL 96 95 94*   CO2 34* 32* 32*   BUN 9 10 11   CREATININE 1.0 1.0 1.0   CALCIUM 9.3 8.6* 9.2       CBC:   Recent Labs   Lab 11/28/22 0440 11/29/22 0405 11/30/22 0406   WBC 10.75 9.04 9.13   HGB 15.4 14.4 14.6   HCT 46.9 44.8 44.1    333 328       All pertinent labs results from the past 24 hours have been reviewed.    Significant Imaging:  All pertinent imaging results/findings from the past 24 hours have been reviewed.                    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  Clinically stable    - Admitted to hospital medicine, appreciate assistance  - Debrided 11/15 and 11/17 in OR. Medial aspect of wound debrided at bedside 11/22.  - Voiding spontaneously  - Diet, insulin and pain control per primary    - Continue antibiotics per infectious disease recs. On cefepime/flagyl inpatient, plan for IV ertapenem outpatient.  - Anaerobic culture with Anaerococcus murdochii and Prevotella bucchalis.  - Dressing changes BID/ RN staff. Wound care following.  - Rest of care per primary        VTE Risk Mitigation (From admission, onward)         Ordered     IP VTE HIGH RISK PATIENT  Once         11/15/22 1948     Reason for No Pharmacological VTE Prophylaxis  Once        Question:  Reasons:  Answer:  Risk of Bleeding    11/15/22 1948     Place sequential  compression device  Until discontinued         11/15/22 1936                Jarred Zamora MD  Urology  [facility] ([place])

## 2022-11-30 NOTE — SUBJECTIVE & OBJECTIVE
Interval History: patient doing well, ID recommending IV ertapenem upon discharge. Seeking LTAC due to BID wound care dressing changes and IV abx. Furosemide changed to torsemide today.     Review of Systems   Constitutional:  Negative for chills and fever.   Respiratory:  Negative for cough and shortness of breath.    Cardiovascular:  Negative for chest pain.   Genitourinary:  Positive for scrotal swelling.   Objective:     Vital Signs (Most Recent):  Temp: 98.4 °F (36.9 °C) (11/29/22 2026)  Pulse: 90 (11/29/22 2026)  Resp: 18 (11/29/22 2029)  BP: 121/73 (11/29/22 2026)  SpO2: (!) 94 % (11/29/22 2026)   Vital Signs (24h Range):  Temp:  [97.5 °F (36.4 °C)-98.8 °F (37.1 °C)] 98.4 °F (36.9 °C)  Pulse:  [] 90  Resp:  [16-18] 18  SpO2:  [88 %-96 %] 94 %  BP: (115-128)/(64-85) 121/73     Weight: 114.1 kg (251 lb 8.7 oz)  Body mass index is 35.08 kg/m².    Intake/Output Summary (Last 24 hours) at 11/29/2022 2232  Last data filed at 11/29/2022 2029  Gross per 24 hour   Intake 760.2 ml   Output 825 ml   Net -64.8 ml        Physical Exam  Vitals reviewed.   Constitutional:       Appearance: Normal appearance.   HENT:      Head: Normocephalic and atraumatic.   Eyes:      General: No scleral icterus.     Conjunctiva/sclera: Conjunctivae normal.   Pulmonary:      Effort: Pulmonary effort is normal. No respiratory distress.   Skin:     Coloration: Skin is not jaundiced.      Findings: No erythema.   Neurological:      General: No focal deficit present.      Mental Status: He is alert and oriented to person, place, and time.   Psychiatric:         Mood and Affect: Mood normal.         Behavior: Behavior normal.       Significant Labs: All pertinent labs within the past 24 hours have been reviewed.    Significant Imaging: I have reviewed all pertinent imaging results/findings within the past 24 hours.

## 2022-12-01 VITALS
SYSTOLIC BLOOD PRESSURE: 139 MMHG | HEART RATE: 87 BPM | TEMPERATURE: 99 F | RESPIRATION RATE: 17 BRPM | OXYGEN SATURATION: 92 % | HEIGHT: 71 IN | BODY MASS INDEX: 35.22 KG/M2 | WEIGHT: 251.56 LBS | DIASTOLIC BLOOD PRESSURE: 73 MMHG

## 2022-12-01 PROBLEM — E87.1 HYPONATREMIA: Status: ACTIVE | Noted: 2022-12-01

## 2022-12-01 LAB
ANION GAP SERPL CALC-SCNC: 10 MMOL/L (ref 8–16)
BASOPHILS # BLD AUTO: 0.09 K/UL (ref 0–0.2)
BASOPHILS NFR BLD: 1.2 % (ref 0–1.9)
BUN SERPL-MCNC: 11 MG/DL (ref 8–23)
CALCIUM SERPL-MCNC: 8.7 MG/DL (ref 8.7–10.5)
CHLORIDE SERPL-SCNC: 94 MMOL/L (ref 95–110)
CO2 SERPL-SCNC: 30 MMOL/L (ref 23–29)
CREAT SERPL-MCNC: 1 MG/DL (ref 0.5–1.4)
DIFFERENTIAL METHOD: ABNORMAL
EOSINOPHIL # BLD AUTO: 0.1 K/UL (ref 0–0.5)
EOSINOPHIL NFR BLD: 1.2 % (ref 0–8)
ERYTHROCYTE [DISTWIDTH] IN BLOOD BY AUTOMATED COUNT: 12.3 % (ref 11.5–14.5)
EST. GFR  (NO RACE VARIABLE): >60 ML/MIN/1.73 M^2
GLUCOSE SERPL-MCNC: 256 MG/DL (ref 70–110)
HCT VFR BLD AUTO: 42.3 % (ref 40–54)
HGB BLD-MCNC: 14.1 G/DL (ref 14–18)
IMM GRANULOCYTES # BLD AUTO: 0.06 K/UL (ref 0–0.04)
IMM GRANULOCYTES NFR BLD AUTO: 0.8 % (ref 0–0.5)
LYMPHOCYTES # BLD AUTO: 1.9 K/UL (ref 1–4.8)
LYMPHOCYTES NFR BLD: 25.6 % (ref 18–48)
MAGNESIUM SERPL-MCNC: 1.9 MG/DL (ref 1.6–2.6)
MCH RBC QN AUTO: 33.3 PG (ref 27–31)
MCHC RBC AUTO-ENTMCNC: 33.3 G/DL (ref 32–36)
MCV RBC AUTO: 100 FL (ref 82–98)
MONOCYTES # BLD AUTO: 1.1 K/UL (ref 0.3–1)
MONOCYTES NFR BLD: 15.4 % (ref 4–15)
NEUTROPHILS # BLD AUTO: 4.1 K/UL (ref 1.8–7.7)
NEUTROPHILS NFR BLD: 55.8 % (ref 38–73)
NRBC BLD-RTO: 0 /100 WBC
PHOSPHATE SERPL-MCNC: 3.3 MG/DL (ref 2.7–4.5)
PLATELET # BLD AUTO: 296 K/UL (ref 150–450)
PMV BLD AUTO: 12.3 FL (ref 9.2–12.9)
POTASSIUM SERPL-SCNC: 3.9 MMOL/L (ref 3.5–5.1)
RBC # BLD AUTO: 4.23 M/UL (ref 4.6–6.2)
SODIUM SERPL-SCNC: 134 MMOL/L (ref 136–145)
WBC # BLD AUTO: 7.41 K/UL (ref 3.9–12.7)

## 2022-12-01 PROCEDURE — 63600175 PHARM REV CODE 636 W HCPCS: Performed by: INTERNAL MEDICINE

## 2022-12-01 PROCEDURE — 80048 BASIC METABOLIC PNL TOTAL CA: CPT | Performed by: HOSPITALIST

## 2022-12-01 PROCEDURE — 99239 HOSP IP/OBS DSCHRG MGMT >30: CPT | Mod: 95,,, | Performed by: HOSPITALIST

## 2022-12-01 PROCEDURE — 63600175 PHARM REV CODE 636 W HCPCS: Performed by: HOSPITALIST

## 2022-12-01 PROCEDURE — 94761 N-INVAS EAR/PLS OXIMETRY MLT: CPT

## 2022-12-01 PROCEDURE — 99239 PR HOSPITAL DISCHARGE DAY,>30 MIN: ICD-10-PCS | Mod: 95,,, | Performed by: HOSPITALIST

## 2022-12-01 PROCEDURE — 99232 PR SUBSEQUENT HOSPITAL CARE,LEVL II: ICD-10-PCS | Mod: ,,, | Performed by: INTERNAL MEDICINE

## 2022-12-01 PROCEDURE — 83735 ASSAY OF MAGNESIUM: CPT | Performed by: HOSPITALIST

## 2022-12-01 PROCEDURE — 25000003 PHARM REV CODE 250: Performed by: HOSPITALIST

## 2022-12-01 PROCEDURE — 99232 SBSQ HOSP IP/OBS MODERATE 35: CPT | Mod: ,,, | Performed by: INTERNAL MEDICINE

## 2022-12-01 PROCEDURE — 27000221 HC OXYGEN, UP TO 24 HOURS

## 2022-12-01 PROCEDURE — 63600175 PHARM REV CODE 636 W HCPCS: Performed by: NURSE PRACTITIONER

## 2022-12-01 PROCEDURE — 25000003 PHARM REV CODE 250: Performed by: INTERNAL MEDICINE

## 2022-12-01 PROCEDURE — 85025 COMPLETE CBC W/AUTO DIFF WBC: CPT | Performed by: HOSPITALIST

## 2022-12-01 PROCEDURE — 36415 COLL VENOUS BLD VENIPUNCTURE: CPT | Performed by: HOSPITALIST

## 2022-12-01 PROCEDURE — 99900035 HC TECH TIME PER 15 MIN (STAT)

## 2022-12-01 PROCEDURE — 25000003 PHARM REV CODE 250: Performed by: UROLOGY

## 2022-12-01 PROCEDURE — 84100 ASSAY OF PHOSPHORUS: CPT | Performed by: HOSPITALIST

## 2022-12-01 RX ADMIN — METRONIDAZOLE 500 MG: 500 TABLET ORAL at 01:12

## 2022-12-01 RX ADMIN — ONDANSETRON 4 MG: 2 INJECTION INTRAMUSCULAR; INTRAVENOUS at 05:12

## 2022-12-01 RX ADMIN — DIGOXIN 125 MCG: 125 TABLET ORAL at 09:12

## 2022-12-01 RX ADMIN — INSULIN ASPART 8 UNITS: 100 INJECTION, SOLUTION INTRAVENOUS; SUBCUTANEOUS at 05:12

## 2022-12-01 RX ADMIN — OXYCODONE 15 MG: 5 TABLET ORAL at 03:12

## 2022-12-01 RX ADMIN — INSULIN DETEMIR 38 UNITS: 100 INJECTION, SOLUTION SUBCUTANEOUS at 09:12

## 2022-12-01 RX ADMIN — DILTIAZEM HYDROCHLORIDE 120 MG: 120 CAPSULE, COATED, EXTENDED RELEASE ORAL at 09:12

## 2022-12-01 RX ADMIN — MORPHINE SULFATE 6 MG: 2 INJECTION, SOLUTION INTRAMUSCULAR; INTRAVENOUS at 05:12

## 2022-12-01 RX ADMIN — MORPHINE SULFATE 6 MG: 2 INJECTION, SOLUTION INTRAMUSCULAR; INTRAVENOUS at 12:12

## 2022-12-01 RX ADMIN — INSULIN ASPART 6 UNITS: 100 INJECTION, SOLUTION INTRAVENOUS; SUBCUTANEOUS at 11:12

## 2022-12-01 RX ADMIN — NYSTATIN 500000 UNITS: 500000 SUSPENSION ORAL at 11:12

## 2022-12-01 RX ADMIN — FOLIC ACID 1 MG: 1 TABLET ORAL at 09:12

## 2022-12-01 RX ADMIN — NYSTATIN 500000 UNITS: 500000 SUSPENSION ORAL at 09:12

## 2022-12-01 RX ADMIN — EZETIMIBE 10 MG: 10 TABLET ORAL at 09:12

## 2022-12-01 RX ADMIN — TORSEMIDE 20 MG: 20 TABLET ORAL at 09:12

## 2022-12-01 RX ADMIN — THERA TABS 1 TABLET: TAB at 09:12

## 2022-12-01 RX ADMIN — CEFEPIME HYDROCHLORIDE 1 G: 1 INJECTION, SOLUTION INTRAVENOUS at 01:12

## 2022-12-01 RX ADMIN — LOSARTAN POTASSIUM 25 MG: 25 TABLET, FILM COATED ORAL at 09:12

## 2022-12-01 RX ADMIN — CEFEPIME HYDROCHLORIDE 1 G: 1 INJECTION, SOLUTION INTRAVENOUS at 05:12

## 2022-12-01 RX ADMIN — METOPROLOL TARTRATE 50 MG: 50 TABLET, FILM COATED ORAL at 09:12

## 2022-12-01 RX ADMIN — CEFEPIME HYDROCHLORIDE 1 G: 1 INJECTION, SOLUTION INTRAVENOUS at 09:12

## 2022-12-01 RX ADMIN — INSULIN ASPART 8 UNITS: 100 INJECTION, SOLUTION INTRAVENOUS; SUBCUTANEOUS at 09:12

## 2022-12-01 RX ADMIN — METRONIDAZOLE 500 MG: 500 TABLET ORAL at 06:12

## 2022-12-01 RX ADMIN — NYSTATIN 500000 UNITS: 500000 SUSPENSION ORAL at 05:12

## 2022-12-01 RX ADMIN — INSULIN ASPART 8 UNITS: 100 INJECTION, SOLUTION INTRAVENOUS; SUBCUTANEOUS at 11:12

## 2022-12-01 RX ADMIN — MORPHINE SULFATE 6 MG: 2 INJECTION, SOLUTION INTRAMUSCULAR; INTRAVENOUS at 09:12

## 2022-12-01 RX ADMIN — ASPIRIN 81 MG CHEWABLE TABLET 81 MG: 81 TABLET CHEWABLE at 09:12

## 2022-12-01 NOTE — PROGRESS NOTES
[facility] ([place])  Urology  Progress Note    Patient Name: Nithin Wagner  MRN: [id number]  Admission Date: 11/15/2022  Hospital Length of Stay: 16 days  Code Status: Full Code   Attending Provider: Isamar Mcneill MD   Primary Care Physician: Tushar Drew MD    Subjective:     HPI:  67M h/o DM2, CAD, HTN, HLD presents with scrotal pain and swelling. Started about 4 weeks ago and has been progressively worsening despite a couple courses of antibiotics (unsure which ones). He reports fevers and chills at home. He also reports nausea, decreased PO intake, multiple syncopal episodes, and stool incontinence. He is on continuous blood glucose monitoring and states blood sugars have been in 190s today. He was seen yesterday in urgent care and diagnosed with scrotal abscess with possible kami's; he referred to the ER but waited until today to come in.    He was hypotensive and tachycardic on arrival in the ED. Sepsis protocol was started. Labs and imaging are currently pending.         Interval History: No acute events overnight. Dressing changed by nursing overnight. Feels well, without complaints. Labs unremarkable. Afebrile, vital signs stable.    Objective:     Temp:  [97.9 °F (36.6 °C)-99 °F (37.2 °C)] 98 °F (36.7 °C)  Pulse:  [] 85  Resp:  [16-20] 20  SpO2:  [92 %-98 %] 92 %  BP: (103-118)/(56-77) 118/77     Body mass index is 35.08 kg/m².           Drains       None                   Physical Exam  Vitals and nursing note reviewed.   Constitutional:       General: He is not in acute distress.     Appearance: Normal appearance. He is obese. He is not toxic-appearing.   HENT:      Nose: Nose normal.      Mouth/Throat:      Mouth: Mucous membranes are moist.   Eyes:      Pupils: Pupils are equal, round, and reactive to light.   Cardiovascular:      Rate and Rhythm: Normal rate.   Abdominal:      General: Abdomen is flat. There is no distension.      Tenderness: There is no abdominal tenderness.    Genitourinary:     Comments: Scrotal wound with no surrounding fluctuance, induration, crepitus. Wound edges appear healthy and viable.  Skin:     General: Skin is warm and dry.   Neurological:      General: No focal deficit present.      Mental Status: He is alert and oriented to person, place, and time.   Psychiatric:         Mood and Affect: Mood normal.         Behavior: Behavior normal.         Thought Content: Thought content normal.         Judgment: Judgment normal.       Significant Labs:    BMP:  Recent Labs   Lab 11/29/22 0405 11/30/22 0406 12/01/22  0433   * 134* 134*   K 4.1 4.1 3.9   CL 95 94* 94*   CO2 32* 32* 30*   BUN 10 11 11   CREATININE 1.0 1.0 1.0   CALCIUM 8.6* 9.2 8.7         CBC:   Recent Labs   Lab 11/29/22 0405 11/30/22 0406 12/01/22  0433   WBC 9.04 9.13 7.41   HGB 14.4 14.6 14.1   HCT 44.8 44.1 42.3    328 296         All pertinent labs results from the past 24 hours have been reviewed.    Significant Imaging:  All pertinent imaging results/findings from the past 24 hours have been reviewed.          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  Clinically stable    - Admitted to hospital medicine, appreciate assistance  - Debrided 11/15 and 11/17 in OR. Medial aspect of wound debrided at bedside 11/22.  - Voiding spontaneously  - Diet, insulin and pain control per primary    - Continue antibiotics per infectious disease recs. On cefepime/flagyl inpatient, plan for IV ertapenem outpatient.  - Anaerobic culture with Anaerococcus murdochii and Prevotella bucchalis.  - Dressing changes BID/ RN staff. Wound care following.  - Rest of care per primary    Urology will sign off. Will arrange for follow up in 2 weeks.        VTE Risk Mitigation (From admission, onward)           Ordered     IP VTE HIGH RISK PATIENT  Once         11/15/22 1948     Reason for No Pharmacological VTE Prophylaxis  Once         Question:  Reasons:  Answer:  Risk of Bleeding    11/15/22 1948     Place sequential compression device  Until discontinued         11/15/22 1936                    Jarred Zamora MD  Urology  [facility] ([place])

## 2022-12-01 NOTE — SUBJECTIVE & OBJECTIVE
Interval History: No complaints    Review of Systems   All other systems reviewed and are negative.  Objective:     Vital Signs (Most Recent):  Temp: 98 °F (36.7 °C) (12/01/22 0738)  Pulse: 89 (12/01/22 0848)  Resp: 20 (12/01/22 0738)  BP: 118/77 (12/01/22 0738)  SpO2: (!) 92 % (12/01/22 0738)   Vital Signs (24h Range):  Temp:  [97.9 °F (36.6 °C)-99 °F (37.2 °C)] 98 °F (36.7 °C)  Pulse:  [] 89  Resp:  [16-20] 20  SpO2:  [92 %-98 %] 92 %  BP: (103-118)/(56-77) 118/77     Weight: 114.1 kg (251 lb 8.7 oz)  Body mass index is 35.08 kg/m².    Estimated Creatinine Clearance: 89.5 mL/min (based on SCr of 1 mg/dL).    Physical Exam  Vitals and nursing note reviewed.   Constitutional:       General: He is not in acute distress.     Appearance: Normal appearance. He is not ill-appearing, toxic-appearing or diaphoretic.   HENT:      Head: Normocephalic and atraumatic.      Right Ear: External ear normal.      Left Ear: External ear normal.      Mouth/Throat:      Mouth: Mucous membranes are moist.   Eyes:      Extraocular Movements: Extraocular movements intact.      Conjunctiva/sclera: Conjunctivae normal.      Pupils: Pupils are equal, round, and reactive to light.   Cardiovascular:      Rate and Rhythm: Normal rate and regular rhythm.   Pulmonary:      Breath sounds: No wheezing, rhonchi or rales.   Abdominal:      General: Abdomen is flat. Bowel sounds are normal.      Palpations: Abdomen is soft.      Tenderness: There is no guarding or rebound.   Genitourinary:     Comments: Erythema and tenderness around packed scrotal wound. No drainage. Inner scrotum visible.  Musculoskeletal:      Right lower leg: Edema present.      Left lower leg: Edema present.   Skin:     General: Skin is warm and dry.   Neurological:      General: No focal deficit present.      Mental Status: He is alert and oriented to person, place, and time.   Psychiatric:         Mood and Affect: Mood normal.         Behavior: Behavior normal.          Thought Content: Thought content normal.         Judgment: Judgment normal.       Significant Labs: CBC:   Recent Labs   Lab 11/30/22  0406 12/01/22  0433   WBC 9.13 7.41   HGB 14.6 14.1   HCT 44.1 42.3    296     CMP:   Recent Labs   Lab 11/30/22  0406 12/01/22  0433   * 134*   K 4.1 3.9   CL 94* 94*   CO2 32* 30*   * 256*   BUN 11 11   CREATININE 1.0 1.0   CALCIUM 9.2 8.7   ANIONGAP 8 10       Significant Imaging: None

## 2022-12-01 NOTE — ASSESSMENT & PLAN NOTE
CAD  - Last echo with normal EF and grade 1 diastolic dysfunction on 10/28/2022  - Elevated troponin due to sepsis with associated hypotension along with DONNA and CHF which were trended down and no ectopy noted on telemetry, and no acute EKG changes  - Continue digoxin 125 mcg p.o. daily, ASA 81 mg p.o. daily and ezetimibe 10 mg p.o. daily  - Restarted Lasix on 11/24, changed to torsemide on 11/29 per cards recs  - Cardiology started metoprolol for SVT   No

## 2022-12-01 NOTE — PROGRESS NOTES
[facility] Medicine  Telemedicine Progress Note    Patient Name: Nithin Wagner  MRN: [id number]  Patient Class: IP- Inpatient   Admission Date: 11/15/2022  Length of Stay: 15 days  Attending Physician: Abelino Pollock MD  Primary Care Provider: Tushar Drew MD          Subjective:     Principal Problem:Sepsis        HPI:  The patient is a 67M with a past medical history of DM2, CAD, HTN, and HLD who presents with scrotal pain and swelling. Started about 4 weeks ago and has been progressively worsening despite a couple courses of antibiotics (unsure which ones). He reports fevers and chills at home, nausea, decreased PO intake, and multiple syncopal episodes with +incontinence of stool. Unable to urinate for the past 24 hours or so. Sugars 190s at home. He was seen yesterday in urgent care and referred to the ER, but says he wasn't able to make it at that time so he comes today instead. On initial workup, the patient has CT evidence of Fourniers gangrene.  He will be brought to the operating room emergently and admitted after to ICU for further management.      Overview/Hospital Course:  Mr. Wagner presented with scrotal pain and swelling.  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  Empiric initiated with cefepime, clindamycin, metronidazole, and vancomycin.  Urology consulted and he underwent emergent excision and debridement of necrotic tissue on 11/15. Repeat washout performed 11/17. Improved and stepped down from ICU 11/18.  Bedside debridement performed on 11/22.  ID consulted and antibiotics regimen changed to cefepime and Flagyl known.  Wound cultures with Anaerococcus and Prevotella species; awaiting sensitivities.  Renal function normalized.  Return of SVT and Cardiology consulted with start of metoprolol.  Awaiting wound culture sensitivities.      Interval History: patient doing well, accepted by [facility] pending insurance authorization. Okay for discharge per  cards, ID, and urology. Patient doing well today.    Review of Systems   Constitutional:  Negative for chills and fever.   Respiratory:  Negative for cough and shortness of breath.    Cardiovascular:  Negative for chest pain.   Genitourinary:  Positive for scrotal swelling.   Objective:     Vital Signs (Most Recent):  Temp: 97.9 °F (36.6 °C) (11/30/22 1610)  Pulse: 79 (11/30/22 1801)  Resp: 18 (11/30/22 1610)  BP: 111/61 (11/30/22 1610)  SpO2: 95 % (11/30/22 1610)   Vital Signs (24h Range):  Temp:  [97.9 °F (36.6 °C)-98.9 °F (37.2 °C)] 97.9 °F (36.6 °C)  Pulse:  [] 79  Resp:  [16-18] 18  SpO2:  [94 %-95 %] 95 %  BP: (104-127)/(61-73) 111/61     Weight: 114.1 kg (251 lb 8.7 oz)  Body mass index is 35.08 kg/m².    Intake/Output Summary (Last 24 hours) at 11/30/2022 1801  Last data filed at 11/30/2022 1609  Gross per 24 hour   Intake 1010.2 ml   Output 2750 ml   Net -1739.8 ml        Physical Exam  Vitals reviewed.   Constitutional:       Appearance: Normal appearance.   HENT:      Head: Normocephalic and atraumatic.   Eyes:      General: No scleral icterus.     Conjunctiva/sclera: Conjunctivae normal.   Pulmonary:      Effort: Pulmonary effort is normal. No respiratory distress.   Skin:     Coloration: Skin is not jaundiced.      Findings: No erythema.   Neurological:      General: No focal deficit present.      Mental Status: He is alert and oriented to person, place, and time.   Psychiatric:         Mood and Affect: Mood normal.         Behavior: Behavior normal.       Significant Labs: All pertinent labs within the past 24 hours have been reviewed.    Significant Imaging: I have reviewed all pertinent imaging results/findings within the past 24 hours.      Assessment/Plan:      * Sepsis  Mary's Gangrene of Scrotum  Leukocytosis  Lactic acidosis  Elevated troponin  - Patient criteria for sepsis with infective focus of Mary gangrene of the scrotum  - Lactic acid level 4.4, procalcitonin >48, WBC 24, and  elevated troponin levels that trended down  - Urology consulted and patient underwent excision and debridement of necrotic scrotal tissue on 11/15; repeat debridement 11/17  - Patient was on cefepime 1g IV q.12, clindamycin 900 mg IV Q 8, metronidazole 500 mg IV Q 8 and vancomycin dosed by pharmacy  - Elevated troponins likely secondary to sepsis and setting of DONNA given no reported chest pain and no acute EKG changes  - Blood cultures no growth to date and wound cultures with Anaerococcus and Prevotella species  - Wound care ordered and Urology following  - Clinically was improving with resolution of lactic acidosis   - Adjust pain medication regimen with addition of medication for breakthrough pain  - ID consult appreciated, and clindamycin and vancomycin stopped, and patient continued cefepime 1g IV q.12 and metronidazole 500 mg IV Q 8 on  11/21  - Ricketts removed on 11/21  - Urology performed beside debridement on 11/22  - Clinically worse on 11/23; repeat blood cultures NGTD, UA negative and CXR with edema  - Stopped IV fluids and restarted lasix  - Clinically improving, follow up studies and await sensitivities for final recommendation for antibiotic regimen by ID along with clearance from Urology that the patient is ready for discharge  - to d/c on IV ertapenem per ID recs, seeking LTAC placement due to BID wound care dressing changes and IVabx    SVT (supraventricular tachycardia)  - Noted runs of SVT on monitor  - Recently diagnosed and treated with digoxin   - Return overnight of SVT   - Reconsult Cardiology, metoprolol added on 11/25  - patient with continued episodes of SVT, cardiology following and diltiazem added 11/28. Reduced losartan and lasix    Lactic acidosis        Leukocytosis        Chronic diastolic heart failure  CAD  - Last echo with normal EF and grade 1 diastolic dysfunction on 10/28/2022  - Elevated troponin due to sepsis with associated hypotension along with DONNA and CHF which were  "trended down and no ectopy noted on telemetry, and no acute EKG changes  - Continue digoxin 125 mcg p.o. daily, ASA 81 mg p.o. daily and ezetimibe 10 mg p.o. daily  - Restarted Lasix on 11/24, changed to torsemide on 11/29 per cards recs  - Cardiology started metoprolol for SVT    DONNA (acute kidney injury)  - Creatinine 2.5, baseline- 1.3; likely due to hypotension/sepsis  - Improved with IV fluids and treatment of sepsis  - Creatinine down to normal levels  - Stopped IV fluids  - Continue monitor with repeat labs and follow I/O's    Hyperkalemia  - Potassium was 6.1 on admission with new DONNA  - Patient takes KCL supplementation daily along with aldactone  - Holding Aldactone and KCl supplementation  - Potassium level has normalized and has remained stable   - Continue to monitor    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  - Glucoses levels reviewed, not at targets   - Increase detemir to 38U subQ daily, and moderate-dose sliding scale insulin aspart 1-10U subQ TIDWM PRN, add Novolog 8U QAC  - Will adjust insulin regimen as needed to achieve glucose between 140 - 180    Essential hypertension  - Hypotensive was on admit that responded to treatment with IV fluids with no need for pressor support  - Blood pressure predominantly in normal range; monitor.  - Restarted BP medication, substitute with losartan 100 mg daily  - Cardiology added metoprolol      VTE Risk Mitigation (From admission, onward)         Ordered     IP VTE HIGH RISK PATIENT  Once         11/15/22 1948     Reason for No Pharmacological VTE Prophylaxis  Once        Question:  Reasons:  Answer:  Risk of Bleeding    11/15/22 1948     Place sequential compression device  Until discontinued         11/15/22 1936                      I have completed this tele-visit with the assistance " of a telepresenter.    The attending portion of this evaluation, treatment, and documentation was performed per Abelino Pollock MD via Telemedicine AudioVisual using the secure PeerPong software platform with 2 way audio/video. The provider was located off-site and the patient is located in the hospital. The aforementioned video software was utilized to document the relevant history and physical exam    Abelino Pollock MD  Department of Hospital Medicine   [facility] ([place])

## 2022-12-01 NOTE — CONSULTS
[facility] ([place])  Wound Care    Patient Name:  Nithin Wagner   MRN:  [id number]  Date: 12/1/2022  Diagnosis: Sepsis    History:     Past Medical History:   Diagnosis Date    Arthritis     Atrial myxoma     CHF (congestive heart failure)     Coronary atherosclerosis     Diabetes mellitus, type 2     Difficult intubation     Hepatitis B     Hyperlipidemia     Hypertension     Non-alcoholic fatty liver disease     Rheumatoid arthritis     Rheumatoid arthritis flare 07/12/2021    Stroke     TIA    Systolic heart failure        Social History     Socioeconomic History    Marital status: Single   Occupational History    Occupation: , respiratory therapist, founded [employer]     Comment: Retired   Tobacco Use    Smoking status: Some Days     Packs/day: 1.00     Years: 35.00     Pack years: 35.00     Types: Cigarettes    Smokeless tobacco: Never   Substance and Sexual Activity    Alcohol use: Not Currently    Drug use: No    Sexual activity: Never     Social Determinants of Health     Financial Resource Strain: High Risk    Difficulty of Paying Living Expenses: Hard   Food Insecurity: No Food Insecurity    Worried About Running Out of Food in the Last Year: Never true    Ran Out of Food in the Last Year: Never true   Transportation Needs: Unmet Transportation Needs    Lack of Transportation (Medical): Yes    Lack of Transportation (Non-Medical): Yes   Physical Activity: Inactive    Days of Exercise per Week: 0 days    Minutes of Exercise per Session: 0 min   Stress: Stress Concern Present    Feeling of Stress : To some extent   Social Connections: Socially Isolated    Frequency of Communication with Friends and Family: More than three times a week    Frequency of Social Gatherings with Friends and Family: More than three times a week    Attends Muslim Services: Never    Active Member of Clubs or Organizations: No    Attends Club or Organization Meetings: Never    Marital Status: Never     Housing Stability: High Risk    Unable to Pay for Housing in the Last Year: Yes    Number of Places Lived in the Last Year: 1    Unstable Housing in the Last Year: No       Precautions:     Allergies as of 11/15/2022 - Reviewed 11/15/2022   Allergen Reaction Noted    Enbrel [etanercept] Shortness Of Breath 07/12/2021    Nsaids (non-steroidal anti-inflammatory drug) Other (See Comments) 06/29/2015    Statins-hmg-coa reductase inhibitors Other (See Comments) 06/29/2015    Pcn [penicillins] Rash 06/29/2015       [facility] Assessment Details/Treatment     Wound care consult received for assessment of skin tears. Patient currently being followed by wound care. No new skin tears found today. Skin injuries to bilateral lower leg already known to wound care team with orders in place.     Upon assessment today noted decreased slough to ulceration to right lower leg. Abrasions to left lower leg. Scrotal wound with clean wound bed. Recommend continuing with wound care orders in place. Nursing and MD team notified. Nursing to maintain pressure injury prevention interventions. Wound care to continue to assist with pt prn.        12/01/22 1017        Altered Skin Integrity 11/15/22 2006 Left anterior;lower Leg #1 Abrasion(s) Purple or maroon localized area of discolored intact skin or non-intact skin or a blood-filled blister.   Date First Assessed/Time First Assessed: 11/15/22 2006   Altered Skin Integrity Present on Admission: yes  Side: Left  Orientation: anterior;lower  Location: Leg  Wound Number: #1  Is this injury device related?: No  Primary Wound Type: Abrasion(s)  D...   Wound Image      Dressing Appearance Dry;Intact;Clean   Drainage Amount None   Drainage Characteristics/Odor No odor   Appearance Maroon;Purple;Dry   Periwound Area Dry   Dressing Applied;Silicone;Foam        Altered Skin Integrity 11/15/22 2007 Right anterior;distal;lower Leg #2 Skin Tear Partial thickness tissue loss. Shallow open ulcer with a red or pink  wound bed, without slough. Intact or Open/Ruptured Serum-filled blister.   Date First Assessed/Time First Assessed: 11/15/22 2007   Altered Skin Integrity Present on Admission: yes  Side: Right  Orientation: anterior;distal;lower  Location: Leg  Wound Number: #2  Is this injury device related?: No  Primary Wound Type: Skin T...   Wound Image      Description of Altered Skin Integrity Full thickness tissue loss. Base is covered by slough and/or eschar in the wound bed   Dressing Appearance Open to air   Drainage Amount Small   Drainage Characteristics/Odor Yellow;Serous;No odor   Appearance Pink;Yellow;Slough;Moist   Yellow (%), Wound Tissue Color 100 %   Periwound Area Dry;Edematous   Wound Edges Open   Care Cleansed with:;Antimicrobial agent   Dressing Applied;Silicone;Foam  (Triad ointment)        Incision/Site 11/17/22 1639 Scrotum   Date First Assessed/Time First Assessed: 11/17/22 1639   Location: Scrotum   Wound Image           Incision WDL ex   Dressing Appearance Dry;Intact;Dried drainage   Drainage Amount Small   Drainage Characteristics/Odor Serosanguineous;No odor   Appearance Pink;Moist   Periwound Area Intact;Dry;Pink   Wound Edges Open   Care Antimicrobial agent   Dressing Absorptive Pad   Packing packed with  (Vashe moistened kerlix)       12/01/2022

## 2022-12-01 NOTE — PLAN OF CARE
12/01/22 1143   Post-Acute Status   Post-Acute Authorization Placement   Post-Acute Placement Status Pending post-acute provider review/more information requested   Discharge Delays (!) Post-Acute Set-up   Discharge Plan   Discharge Plan A Long-term acute care facility (LTAC)   Discharge Plan B Skilled Nursing Facility        Patient will need a P2P to be completed by Friday. Patient P2P is set for 3:30 pm today. CM updated the vm team for the P2P

## 2022-12-01 NOTE — PLAN OF CARE
Recommendations  Continue on diabetic, 2000kcal diet  Encourage PO intake at meals  Consider the ADDITION of carson to promote wound healing  Monitor nutrition related labs    Goals:   Pt to consume at least 75% PO intake by RD follow up  Monitored labs to trend toward target ranges    Nutrition Goal Status: progressing towards goals  Communication of RD Recs:  (poc)

## 2022-12-01 NOTE — SUBJECTIVE & OBJECTIVE
Interval History: patient doing well, accepted by [facility] pending insurance authorization. Okay for discharge per cards, ID, and urology. Patient doing well today.    Review of Systems   Constitutional:  Negative for chills and fever.   Respiratory:  Negative for cough and shortness of breath.    Cardiovascular:  Negative for chest pain.   Genitourinary:  Positive for scrotal swelling.   Objective:     Vital Signs (Most Recent):  Temp: 97.9 °F (36.6 °C) (11/30/22 1610)  Pulse: 79 (11/30/22 1801)  Resp: 18 (11/30/22 1610)  BP: 111/61 (11/30/22 1610)  SpO2: 95 % (11/30/22 1610)   Vital Signs (24h Range):  Temp:  [97.9 °F (36.6 °C)-98.9 °F (37.2 °C)] 97.9 °F (36.6 °C)  Pulse:  [] 79  Resp:  [16-18] 18  SpO2:  [94 %-95 %] 95 %  BP: (104-127)/(61-73) 111/61     Weight: 114.1 kg (251 lb 8.7 oz)  Body mass index is 35.08 kg/m².    Intake/Output Summary (Last 24 hours) at 11/30/2022 1801  Last data filed at 11/30/2022 1609  Gross per 24 hour   Intake 1010.2 ml   Output 2750 ml   Net -1739.8 ml        Physical Exam  Vitals reviewed.   Constitutional:       Appearance: Normal appearance.   HENT:      Head: Normocephalic and atraumatic.   Eyes:      General: No scleral icterus.     Conjunctiva/sclera: Conjunctivae normal.   Pulmonary:      Effort: Pulmonary effort is normal. No respiratory distress.   Skin:     Coloration: Skin is not jaundiced.      Findings: No erythema.   Neurological:      General: No focal deficit present.      Mental Status: He is alert and oriented to person, place, and time.   Psychiatric:         Mood and Affect: Mood normal.         Behavior: Behavior normal.       Significant Labs: All pertinent labs within the past 24 hours have been reviewed.    Significant Imaging: I have reviewed all pertinent imaging results/findings within the past 24 hours.

## 2022-12-01 NOTE — PLAN OF CARE
12/01/22 0842   Post-Acute Status   Post-Acute Authorization Placement   Post-Acute Placement Status Pending payor review/awaiting authorization (if required)   Discharge Delays (!) Post-Acute Set-up   Discharge Plan   Discharge Plan A Assisted Living   Discharge Plan B Assisted Living        CM contacted Karo at [facility] concerning the patient discharge placement. Karo stated the patient auth is still pending.  [phone]---- [facility]

## 2022-12-01 NOTE — CONSULTS
[facility] ([place]  Adult Nutrition  Progress Note    SUMMARY       Recommendations  Continue on diabetic, 2000kcal diet  Encourage PO intake at meals  Consider the ADDITION of carson to promote wound healing  Monitor nutrition related labs    Goals:   Pt to consume at least 75% PO intake by RD follow up  Monitored labs to trend toward target ranges    Nutrition Goal Status: progressing towards goals  Communication of RD Recs:  (poc)    Assessment and Plan  Nutrition Problem  Increased protein intake    Related to (etiology):   Wound healing    Signs and Symptoms (as evidenced by):   Wound debridement of necrotic tissue, scrotum  Altered skin integrity of L leg, elbow    Interventions/Recommendations (treatment strategy):  [facility]  Collaboration with other providers    Nutrition Diagnosis Status:   Continues     Reason for Assessment  Reason For Assessment: RD follow up  Diagnosis: infection/sepsis  Relevant Medical History:   Patient Active Problem List   Diagnosis    Hepatitis B core antibody positive    NAFLD (nonalcoholic fatty liver disease)    Essential hypertension    Systolic heart failure    Type 2 diabetes mellitus with hyperglycemia, with long-term current use of insulin    Class 1 obesity due to excess calories with serious comorbidity and body mass index (BMI) of 34.0 to 34.9 in adult    Smoker unmotivated to quit    Polycythemia, secondary    Canker sores oral    Coronary artery disease    Chronic use of opiate drug for therapeutic purpose    Rheumatoid arthritis flare    Fatigue    Sepsis    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 No V/D/C at this time but states intermittent nausea. Pt ate about half his breakfast and asked to have a  "hamburger and more pudding/yogurt. Encouraged pt to call and place meal orders through [employer]. Discussed role as the dietitian and pt voiced understanding. Pt states he follows a strict diabetic diet at home- 2000kcal, 150-200g cho/day, is on meal time and long acting insulin. Eats CHO with protein and healthy fats. Pt informed RD he wrote one of the first nutritional assessments in the 1970s for [place]. Consulted for unintentional weight loss per MD. No significant weight changes noted in chart: 12/22/21- 251#; 10/28/22- 250#; 11/1/.5#; current weight 251.8#. Felipe 18- altered skin integrity scrotum, L/R Leg, R elbow. PICC. Mild to moderate edema. NFPE- completed 12/1/22 pt is nourished.      11/25: Decreased appetite in the past few weeks, 75% intake with fair appetite noted by RN per charts. Felipe 20. NFPE-MIRNA RD remote  Nutrition Discharge Planning: dc on diabetic diet, 2000kcal    Nutrition Risk Screen  Nutrition Risk Screen: large or nonhealing wound, burn or pressure injury    Nutrition/Diet History  Patient Reported Diet/Restrictions/Preferences: diabetic diet (150-200g, 200kcal per pt)  Spiritual, Cultural Beliefs, Holiness Practices, Values that Affect Care: no  Factors Affecting Nutritional Intake: decreased appetite    Anthropometrics  Temp: 98 °F (36.7 °C)  Height Method: Stated  Height: 5' 11" (180.3 cm)  Height (inches): 71 in  Weight Method: Bed Scale  Weight: 114.1 kg (251 lb 8.7 oz)  Weight (lb): 251.55 lb  Ideal Body Weight (IBW), Male: 172 lb  % Ideal Body Weight, Male (lb): 146.25 %  BMI (Calculated): 35.1  BMI Grade: 35 - 39.9 - obesity - grade II       Lab/Procedures/Meds  Pertinent Labs Reviewed: reviewed  CBC:  Recent Labs   Lab 12/01/22 0433   WBC 7.41   HGB 14.1   HCT 42.3        CMP:  Recent Labs   Lab 12/01/22  0433   CALCIUM 8.7   *   K 3.9   CO2 30*   CL 94*   BUN 11   CREATININE 1.0     Glucose   Date Value Ref Range Status   12/01/2022 256 (H) 70 - 110 " mg/dL Final     Hemoglobin A1C   Date Value Ref Range Status   11/15/2022 7.6 (H) 4.0 - 5.6 % Final     Comment:     ADA Screening Guidelines:  5.7-6.4%  Consistent with prediabetes  >or=6.5%  Consistent with diabetes    High levels of fetal hemoglobin interfere with the HbA1C  assay. Heterozygous hemoglobin variants (HbS, HgC, etc)do  not significantly interfere with this assay.   However, presence of multiple variants may affect accuracy.     04/22/2022 7.5 (H) 4.0 - 5.6 % Final     Comment:     ADA Screening Guidelines:  5.7-6.4%  Consistent with prediabetes  >or=6.5%  Consistent with diabetes    High levels of fetal hemoglobin interfere with the HbA1C  assay. Heterozygous hemoglobin variants (HbS, HgC, etc)do  not significantly interfere with this assay.   However, presence of multiple variants may affect accuracy.     03/02/2022 7.1 (H) 4.0 - 5.6 % Final     Comment:     ADA Screening Guidelines:  5.7-6.4%  Consistent with prediabetes  >or=6.5%  Consistent with diabetes    High levels of fetal hemoglobin interfere with the HbA1C  assay. Heterozygous hemoglobin variants (HbS, HgC, etc)do  not significantly interfere with this assay.   However, presence of multiple variants may affect accuracy.       Pertinent Medications Reviewed: reviewed  Scheduled Meds:   aspirin  81 mg Oral Daily    ceFEPime (MAXIPIME) IVPB  1 g Intravenous Q8H    digoxin  125 mcg Oral Daily    diltiaZEM  120 mg Oral Q12H    ezetimibe  10 mg Oral Daily    folic acid  1 mg Oral Daily    insulin aspart U-100  8 Units Subcutaneous TIDWM    insulin detemir U-100  38 Units Subcutaneous Daily    metoprolol tartrate  50 mg Oral Q12H    metroNIDAZOLE  500 mg Oral Q8H    multivitamin  1 tablet Oral Daily    nystatin  5 mL Oral QID (WM & HS)    torsemide  20 mg Oral Daily     Continuous Infusions:  PRN Meds:.sodium chloride 0.9%, acetaminophen, albuterol-ipratropium, benzonatate, dextrose 10%, dextrose 10%, dextrose 10%, dextrose 10%, glucagon (human  recombinant), glucose, glucose, insulin aspart U-100, metoprolol, morphine, naloxone, ondansetron, oxyCODONE, oxyCODONE-acetaminophen, sodium chloride 0.9%      Estimated/Assessed Needs  Weight Used For Calorie Calculations: 114.1 kg (251 lb 8.7 oz)  Energy Calorie Requirements (kcal): 1928  Energy Need Method: [place]-St Jeor (No PA)  Protein Requirements: 114 (1.0g/kg)  Weight Used For Protein Calculations: 114.1 kg (251 lb 8.7 oz)  Fluid Requirements (mL): 1mL/kcal  Estimated Fluid Requirement Method:  (or per MD)  RDA Method (mL): 1928  CHO Requirement: 250g (2000kcal)    Nutrition Prescription Ordered  Current Diet Order: diabetic diet, 2000kcal    Evaluation of Received Nutrient/Fluid Intake  I/O: -13.6L since admit  Energy Calories Required: meeting needs  Protein Required: meeting needs  Fluid Required: meeting needs  Comments: LBM 11/28  Tolerance: tolerating  % Intake of Estimated Energy Needs: 25 - 50 %  % Meal Intake: 25 - 50 %  Intake/Output - Last 3 Shifts         11/29 0700  11/30 0659 11/30 0700  12/01 0659 12/01 0700  12/02 0659    P.O. 118 200     I.V. (mL/kg) 109.8 (1)      IV Piggyback 582.4      Total Intake(mL/kg) 810.2 (7.1) 200 (1.8)     Urine (mL/kg/hr) 1250 (0.5) 2100 (0.8) 600 (0.8)    Emesis/NG output 0      Stool 0      Blood 0      Total Output 1250 2100 600    Net -439.8 -1900 -600           Urine Occurrence   2 x          Nutrition Risk  Level of Risk/Frequency of Follow-up:  (1-2x/week)     Monitor and Evaluation  Food and Nutrient Intake: energy intake, food and beverage intake  Food and Nutrient Adminstration: diet order  Knowledge/Beliefs/Attitudes: food and nutrition knowledge/skill, beliefs and attitudes  Physical Activity and Function: nutrition-related ADLs and IADLs, factors affecting access to physical activity  Anthropometric Measurements: weight, weight change, body mass index  Biochemical Data, Medical Tests and Procedures: electrolyte and renal panel, gastrointestinal  profile, glucose/endocrine profile, inflammatory profile, lipid profile  Nutrition-Focused Physical Findings: overall appearance     Nutrition Follow-Up  RD Follow-up?: Yes    Radha Quintero, Registration Eligible, Provisional LDN

## 2022-12-01 NOTE — PROGRESS NOTES
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 Awaiting [facility] for IV antibiotics, wound care, and pain control.      Thank you for your consult. I will follow-up with patient. Please contact us if you have any additional questions.    Javid Panchal MD  Infectious Disease  [facility] ([place])    Subjective:     Principal Problem:Sepsis    HPI: 67M h/o DM2, CAD, HTN, HLD presents with scrotal pain and swelling. Started about 4 weeks ago and has been progressively worsening despite a couple courses of antibiotics (most recently Bactrim). He reports fevers and chills at home. He also reports nausea, decreased PO intake, multiple syncopal episodes, and stool incontinence. He is on continuous blood glucose monitoring and states blood sugars have been in 190s today. He was seen PTA in urgent care and diagnosed with scrotal abscess with possible Mary's; he referred to the ER but waited to present. After taking a nap when getting his toothbrush, he woke up confused and in feces. He was hypotensive and tachycardic, on arrival in the ED. Sepsis protocol was started. Labs and imaging are noted c/w scrotal abscess and Mary's. Patient went to OR twice for debridement. Appropriate cultures were obtained and grew Anaerococcus. The patient is  currently on cefepime, vancomycin, and Flagyl. ID is consulted for abx recommendations for Mary's.        Interval History: No complaints    Review of Systems   All other systems reviewed and are negative.  Objective:     Vital Signs (Most Recent):  Temp: 98 °F (36.7 °C) (12/01/22 0738)  Pulse: 89 (12/01/22 0848)  Resp: 20 (12/01/22 0738)  BP: 118/77 (12/01/22 0738)  SpO2: (!) 92 % (12/01/22 0738)   Vital Signs (24h Range):  Temp:  [97.9 °F (36.6 °C)-99 °F (37.2 °C)] 98 °F (36.7 °C)  Pulse:  [] 89  Resp:  [16-20] 20  SpO2:  [92 %-98 %] 92 %  BP: (103-118)/(56-77) 118/77     Weight: 114.1 kg (251 lb 8.7 oz)  Body mass index is 35.08 kg/m².    Estimated Creatinine Clearance: 89.5 mL/min (based on SCr of 1 mg/dL).    Physical Exam  Vitals and nursing note reviewed.   Constitutional:       General: He is not in acute distress.     Appearance: Normal appearance. He is not ill-appearing, toxic-appearing or diaphoretic.   HENT:      Head: Normocephalic and atraumatic.      Right Ear: External ear normal.      Left Ear: External ear normal.      Mouth/Throat:      Mouth: Mucous membranes are moist.   Eyes:      Extraocular Movements: Extraocular movements intact.      Conjunctiva/sclera: Conjunctivae normal.      Pupils: Pupils are equal, round, and reactive to light.   Cardiovascular:      Rate and Rhythm: Normal rate and regular rhythm.   Pulmonary:      Breath sounds: No wheezing, rhonchi or rales.   Abdominal:      General: Abdomen is flat. Bowel sounds are normal.      Palpations: Abdomen is soft.      Tenderness: There is no guarding or rebound.   Genitourinary:     Comments: Erythema and tenderness around packed scrotal wound. No drainage. Inner scrotum visible.  Musculoskeletal:      Right lower leg: Edema present.      Left lower leg: Edema present.   Skin:     General: Skin is warm and dry.   Neurological:      General: No focal deficit present.      Mental Status: He is alert and oriented to person,  place, and time.   Psychiatric:         Mood and Affect: Mood normal.         Behavior: Behavior normal.         Thought Content: Thought content normal.         Judgment: Judgment normal.       Significant Labs: CBC:   Recent Labs   Lab 11/30/22  0406 12/01/22 0433   WBC 9.13 7.41   HGB 14.6 14.1   HCT 44.1 42.3    296     CMP:   Recent Labs   Lab 11/30/22  0406 12/01/22 0433   * 134*   K 4.1 3.9   CL 94* 94*   CO2 32* 30*   * 256*   BUN 11 11   CREATININE 1.0 1.0   CALCIUM 9.2 8.7   ANIONGAP 8 10       Significant Imaging: None

## 2022-12-01 NOTE — SUBJECTIVE & OBJECTIVE
Interval History: No acute events overnight. Dressing changed by nursing overnight. Feels well, without complaints. Labs unremarkable. Afebrile, vital signs stable.    Objective:     Temp:  [97.9 °F (36.6 °C)-99 °F (37.2 °C)] 98 °F (36.7 °C)  Pulse:  [] 85  Resp:  [16-20] 20  SpO2:  [92 %-98 %] 92 %  BP: (103-118)/(56-77) 118/77     Body mass index is 35.08 kg/m².           Drains       None                   Physical Exam  Vitals and nursing note reviewed.   Constitutional:       General: He is not in acute distress.     Appearance: Normal appearance. He is obese. He is not toxic-appearing.   HENT:      Nose: Nose normal.      Mouth/Throat:      Mouth: Mucous membranes are moist.   Eyes:      Pupils: Pupils are equal, round, and reactive to light.   Cardiovascular:      Rate and Rhythm: Normal rate.   Abdominal:      General: Abdomen is flat. There is no distension.      Tenderness: There is no abdominal tenderness.   Genitourinary:     Comments: Scrotal wound with no surrounding fluctuance, induration, crepitus. Wound edges appear healthy and viable.  Skin:     General: Skin is warm and dry.   Neurological:      General: No focal deficit present.      Mental Status: He is alert and oriented to person, place, and time.   Psychiatric:         Mood and Affect: Mood normal.         Behavior: Behavior normal.         Thought Content: Thought content normal.         Judgment: Judgment normal.       Significant Labs:    BMP:  Recent Labs   Lab 11/29/22 0405 11/30/22 0406 12/01/22  0433   * 134* 134*   K 4.1 4.1 3.9   CL 95 94* 94*   CO2 32* 32* 30*   BUN 10 11 11   CREATININE 1.0 1.0 1.0   CALCIUM 8.6* 9.2 8.7         CBC:   Recent Labs   Lab 11/29/22 0405 11/30/22 0406 12/01/22  0433   WBC 9.04 9.13 7.41   HGB 14.4 14.6 14.1   HCT 44.8 44.1 42.3    328 296         All pertinent labs results from the past 24 hours have been reviewed.    Significant Imaging:  All pertinent imaging results/findings  from the past 24 hours have been reviewed.

## 2022-12-01 NOTE — PROGRESS NOTES
[facility]    Admission date:  11/15/2022     Assessment    Chronic systolic heart failure   Remains compensated    Coronary atherosclerosis   Stable     Supraventricular tachycardia   Controlled on current regimen    Hypertension   Adequate control    Plan and Discussion    Will hold losartan for now and see how his blood pressure and symptoms do.  Plan to restart as an outpatient when hopefully some of his AV shaylee blocking agents can be tapered.    Subjective    Feels like his blood pressure is too low    Medications  Current Facility-Administered Medications   Medication Dose Route Frequency Provider Last Rate Last Admin    0.9%  NaCl infusion   Intravenous PRN Abelino Pollock MD 10 mL/hr at 11/30/22 1832 New Bag at 11/30/22 1832    acetaminophen tablet 650 mg  650 mg Oral Q4H PRN Roger Davis NP   650 mg at 11/28/22 0203    albuterol-ipratropium 2.5 mg-0.5 mg/3 mL nebulizer solution 3 mL  3 mL Nebulization Q4H PRN William Hatch MD   3 mL at 11/28/22 1920    aspirin chewable tablet 81 mg  81 mg Oral Daily William Hatch MD   81 mg at 12/01/22 0919    benzonatate capsule 100 mg  100 mg Oral TID PRN Roger Davis NP        cefepime in dextrose 5 % 1 gram/50 mL IVPB 1 g  1 g Intravenous Q8H CARISA Lozano MD   Stopped at 12/01/22 1007    dextrose 10% bolus 125 mL  12.5 g Intravenous PRN William Hatch MD   Stopped at 11/17/22 0925    dextrose 10% bolus 125 mL  12.5 g Intravenous PRN Roger Davis NP        dextrose 10% bolus 250 mL  25 g Intravenous PRN William Hatch MD        dextrose 10% bolus 250 mL  25 g Intravenous PRN Roger Davis NP        digoxin tablet 125 mcg  125 mcg Oral Daily William Hatch MD   125 mcg at 12/01/22 0919    diltiaZEM 24 hr capsule 120 mg  120 mg Oral Q12H Shemar Dmepsey MD   120 mg at 12/01/22 0919    ezetimibe tablet 10 mg  10 mg Oral Daily William Hatch MD   10 mg at 12/01/22 0919    folic acid tablet 1 mg   1 mg Oral Daily William Hatch MD   1 mg at 12/01/22 0919    glucagon (human recombinant) injection 1 mg  1 mg Intramuscular PRN Roger Davis NP        glucose chewable tablet 16 g  16 g Oral PRN Roger Davis NP        glucose chewable tablet 24 g  24 g Oral PRN Roger Davis NP        insulin aspart U-100 pen 1-10 Units  1-10 Units Subcutaneous QID (AC + HS) PRN Roger Davis NP   6 Units at 12/01/22 1138    insulin aspart U-100 pen 8 Units  8 Units Subcutaneous TIDWM Darby Obrien MD   8 Units at 12/01/22 1136    insulin detemir U-100 pen 38 Units  38 Units Subcutaneous Daily Darby Obrien MD   38 Units at 12/01/22 0929    losartan tablet 25 mg  25 mg Oral Q12H Shemar Dempsey MD   25 mg at 12/01/22 0919    metoprolol injection 5 mg  5 mg Intravenous Q4H PRN Laila Norton MD   5 mg at 11/27/22 0413    metoprolol tartrate (LOPRESSOR) tablet 50 mg  50 mg Oral Q12H Laila Norton MD   50 mg at 12/01/22 0919    metroNIDAZOLE tablet 500 mg  500 mg Oral Q8H Raj Christianson MD   500 mg at 12/01/22 0656    morphine injection 6 mg  6 mg Intravenous Q4H PRN Darby Obrien MD   6 mg at 12/01/22 0959    multivitamin tablet  1 tablet Oral Daily William Hatch MD   1 tablet at 12/01/22 0919    naloxone 0.4 mg/mL injection 0.02 mg  0.02 mg Intravenous PRN Roger Davis NP        nystatin 100,000 unit/mL suspension 500,000 Units  5 mL Oral QID (WM & HS) Darby Obrien MD   500,000 Units at 12/01/22 1136    ondansetron injection 4 mg  4 mg Intravenous Q8H PRN Roger Davis NP   4 mg at 11/30/22 2032    oxyCODONE immediate release tablet 15 mg  15 mg Oral Q3H PRN Darby Obrien MD   15 mg at 12/01/22 0343    oxyCODONE-acetaminophen 5-325 mg per tablet 1 tablet  1 tablet Oral Q4H PRN William Hatch MD   1 tablet at 11/29/22 1219    sodium chloride 0.9% flush 10 mL  10 mL Intravenous PRN William Hatch MD        torsemide tablet 20 mg  20 mg Oral Daily Shemar Dempsey MD    20 mg at 12/01/22 0917        Physical Exam    Temp:  [97.9 °F (36.6 °C)-99 °F (37.2 °C)]   Pulse:  []   Resp:  [16-20]   BP: (103-118)/(56-77)   SpO2:  [91 %-98 %]    Wt Readings from Last 3 Encounters:   11/25/22 114.1 kg (251 lb 8.7 oz)   11/14/22 110 kg (242 lb 9.6 oz)   11/02/22 111.4 kg (245 lb 11.2 oz)     Physical Exam  Constitutional:       General: He is not in acute distress.  Neck:      Vascular: No JVD.   Cardiovascular:      Rate and Rhythm: Normal rate and regular rhythm.      Heart sounds: S1 normal and S2 normal. No murmur heard.    No S3 or S4 sounds.   Pulmonary:      Effort: Pulmonary effort is normal.      Breath sounds: Normal breath sounds and air entry.   Abdominal:      General: Bowel sounds are normal.      Palpations: Abdomen is soft. There is no hepatomegaly.      Tenderness: There is no abdominal tenderness.   Musculoskeletal:      Right lower leg: No edema.      Left lower leg: No edema.   Skin:     Coloration: Skin is not pale.   Neurological:      Mental Status: He is alert.   Psychiatric:         Behavior: Behavior is cooperative.       Telemetry  Sinus rhythm with a short episode of an accelerated idioventricular rhythm    Labs  Recent Results (from the past 24 hour(s))   Magnesium    Collection Time: 12/01/22  4:33 AM   Result Value Ref Range    Magnesium 1.9 1.6 - 2.6 mg/dL   Phosphorus    Collection Time: 12/01/22  4:33 AM   Result Value Ref Range    Phosphorus 3.3 2.7 - 4.5 mg/dL   Basic Metabolic Panel    Collection Time: 12/01/22  4:33 AM   Result Value Ref Range    Sodium 134 (L) 136 - 145 mmol/L    Potassium 3.9 3.5 - 5.1 mmol/L    Chloride 94 (L) 95 - 110 mmol/L    CO2 30 (H) 23 - 29 mmol/L    Glucose 256 (H) 70 - 110 mg/dL    BUN 11 8 - 23 mg/dL    Creatinine 1.0 0.5 - 1.4 mg/dL    Calcium 8.7 8.7 - 10.5 mg/dL    Anion Gap 10 8 - 16 mmol/L    eGFR >60 >60 mL/min/1.73 m^2   CBC Auto Differential    Collection Time: 12/01/22  4:33 AM   Result Value Ref Range    WBC  7.41 3.90 - 12.70 K/uL    RBC 4.23 (L) 4.60 - 6.20 M/uL    Hemoglobin 14.1 14.0 - 18.0 g/dL    Hematocrit 42.3 40.0 - 54.0 %     (H) 82 - 98 fL    MCH 33.3 (H) 27.0 - 31.0 pg    MCHC 33.3 32.0 - 36.0 g/dL    RDW 12.3 11.5 - 14.5 %    Platelets 296 150 - 450 K/uL    MPV 12.3 9.2 - 12.9 fL    Immature Granulocytes 0.8 (H) 0.0 - 0.5 %    Gran # (ANC) 4.1 1.8 - 7.7 K/uL    Immature Grans (Abs) 0.06 (H) 0.00 - 0.04 K/uL    Lymph # 1.9 1.0 - 4.8 K/uL    Mono # 1.1 (H) 0.3 - 1.0 K/uL    Eos # 0.1 0.0 - 0.5 K/uL    Baso # 0.09 0.00 - 0.20 K/uL    nRBC 0 0 /100 WBC    Gran % 55.8 38.0 - 73.0 %    Lymph % 25.6 18.0 - 48.0 %    Mono % 15.4 (H) 4.0 - 15.0 %    Eosinophil % 1.2 0.0 - 8.0 %    Basophil % 1.2 0.0 - 1.9 %    Differential Method Automated              Shemar Dempsey MD

## 2022-12-01 NOTE — PLAN OF CARE
12/01/22 1657   Final Note   Assessment Type Final Discharge Note   Anticipated Discharge Disposition LTAC   Hospital Resources/Appts/Education Provided Provided patient/caregiver with written discharge plan information;Appointments scheduled and added to AVS;Appointments scheduled by Navigator/Coordinator   Post-Acute Status   Post-Acute Authorization Placement   Post-Acute Placement Status Set-up Complete/Auth obtained   Discharge Delays None known at this time       Patient will discharge to [facility] located at [facility] [place] [address]..     CM will place an ADT 30 to transport the patient to [facility] [place] [address]    Patient will go in room  235, please call report to [phone].    All discharge needs addressed

## 2022-12-01 NOTE — DISCHARGE SUMMARY
[facility] Medicine  Discharge Summary      Patient Name: Nithin Wagner  MRN: [id number]  Patient Class: IP- Inpatient  Admission Date: 11/15/2022  Hospital Length of Stay: 16 days  Discharge Date and Time:  12/01/2022 5:49 PM  Attending Physician: Isamar Mcneill MD   Discharging Provider: Isamar Mcneill MD  Primary Care Provider: Tushar Drew MD      HPI:   The patient is a 67M with a past medical history of DM2, CAD, HTN, and HLD who presents with scrotal pain and swelling. Started about 4 weeks ago and has been progressively worsening despite a couple courses of antibiotics (unsure which ones). He reports fevers and chills at home, nausea, decreased PO intake, and multiple syncopal episodes with +incontinence of stool. Unable to urinate for the past 24 hours or so. Sugars 190s at home. He was seen yesterday in urgent care and referred to the ER, but says he wasn't able to make it at that time so he comes today instead. On initial workup, the patient has CT evidence of Fourniers gangrene.  He will be brought to the operating room emergently and admitted after to ICU for further management.      Procedure(s) (LRB):  INCISION AND DRAINAGE, SCROTUM (N/A)      Hospital Course:   Mr. Wagner presented with scrotal pain and swelling.  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  Empiric initiated with cefepime, clindamycin, metronidazole, and vancomycin.  Urology consulted and he underwent emergent excision and debridement of necrotic tissue on 11/15. Repeat washout performed 11/17. Improved and stepped down from ICU 11/18.  Bedside debridement performed on 11/22.  ID consulted and antibiotics regimen changed to cefepime and Flagyl known.  Wound cultures with Anaerococcus and Prevotella species.  Renal function normalized.  Return of SVT and Cardiology consulted with start of metoprolol.  Renal function normalized.  Re ID gave final abx recs (Continue cefepime and flagyl) and will  follow at LTAC.  Continue wound care.  Patient discharged to LTAC       Goals of Care Treatment Preferences:  Code Status: Full Code      Consults:   Consults (From admission, onward)        Status Ordering Provider     Inpatient consult to Registered Dietitian/Nutritionist  Once        Provider:  (Not yet assigned)    Completed LORRIE ALEGRE     Inpatient consult to Registered Dietitian/Nutritionist  Once        Provider:  (Not yet assigned)    Completed LORRIE ALEGRE     Inpatient consult to Midline team  Once        Provider:  (Not yet assigned)    Completed EDWIN CURIEL     Inpatient virtual consult to Hospital Medicine  Once        Provider:  Edwin Curiel MD    Completed KOKI MUNIZ     Inpatient consult to Cardiology  Once        Provider:  Laila Norton MD    Completed VIVEK LA     Inpatient consult to Infectious Diseases  Once        Provider:  Raj Christianson MD    Completed KOKI MUNIZ          No new Assessment & Plan notes have been filed under this hospital service since the last note was generated.  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    Significant Diagnostic Studies: Labs:   CMP   Recent Labs   Lab 11/30/22  0406 12/01/22  0433   * 134*   K 4.1 3.9   CL 94* 94*   CO2 32* 30*   * 256*   BUN 11 11   CREATININE 1.0 1.0   CALCIUM 9.2 8.7   ANIONGAP 8 10    and CBC   Recent Labs   Lab 11/30/22  0406 12/01/22  0433   WBC 9.13 7.41   HGB 14.6 14.1   HCT 44.1 42.3    296       Pending Diagnostic Studies:     None         Medications:  Reconciled Home Medications:      Medication List      ASK your doctor about these medications    aspirin 81 MG Chew  Take 81 mg by mouth.     blood sugar diagnostic Strp  To check BG 3 times daily, to use with insurance preferred meter     blood-glucose meter kit  Use to check glucose as directed     ciclopirox 8 % Soln  Commonly known as: PENLAC  Apply topically nightly.     clotrimazole 1 % cream  Commonly known as: LOTRIMIN  Apply topically 2 (two) times daily.     digoxin 125 mcg tablet  Commonly known as: LANOXIN  Take 1 tablet (125 mcg total) by mouth once daily.     ergocalciferol 50,000 unit Cap  Commonly known as: ERGOCALCIFEROL  Take 1 capsule (50,000 Units total) by mouth every 7 days.     ezetimibe 10 mg tablet  Commonly known as: ZETIA  TAKE 1 TABLET BY MOUTH EVERY DAY     folic acid 1 MG tablet  Commonly known as: FOLVITE     furosemide 40 MG tablet  Commonly known as: LASIX  Take 1 tablet (40 mg total) by mouth daily as needed (fluid).  Ask about: Which instructions should I use?     hydroCHLOROthiazide 25 MG tablet  Commonly known as: HYDRODIURIL  Take 1 tablet (25 mg total) by mouth once daily.     HYDROcodone-acetaminophen  mg per tablet  Commonly known as: NORCO     imiquimod 5 % cream  Commonly known as: ALDARA  APPLY 3 DAYS A WEEK TO WARTS UNTIL COMPLETE CLEARANCE OF WARTS     insulin admin supplies Inpn  InPen device, use to inject mealtime insulin 3 times daily. 1 pen to use, 1 for backup as needed.     lamiVUDine 150 MG Tab  Commonly known as:  "EPIVIR  Take 1 tablet (150 mg total) by mouth once daily.     LINZESS 72 mcg Cap capsule  Generic drug: linaCLOtide     minocycline 100 MG capsule  Commonly known as: MINOCIN,DYNACIN  Take 100 mg by mouth 2 (two) times daily.     multivitamin per tablet  Commonly known as: THERAGRAN  Take 1 tablet by mouth once daily.     nitroGLYCERIN 0.4 MG SL tablet  Commonly known as: NITROSTAT  Place 1 tablet (0.4 mg total) under the tongue every 5 (five) minutes as needed for Chest pain.     NovoLOG PenFill U-100 Insulin 100 unit/mL Crtg  Generic drug: insulin aspart U-100  INJECT 30 UNITS INTO THE SKIN THREE TIMES A DAY WITH MEALS. MAX DAILY DOSE 100 UNITS PER DAY     nystatin cream  Commonly known as: MYCOSTATIN  Apply topically 3 (three) times daily as needed for Dry Skin.     olmesartan 40 MG tablet  Commonly known as: BENICAR  Take 1 tablet (40 mg total) by mouth nightly.     ondansetron 4 MG tablet  Commonly known as: ZOFRAN  Take 1 tablet (4 mg total) by mouth every 8 (eight) hours as needed for Nausea.     OXYGEN-AIR DELIVERY SYSTEMS MISC  by Misc.(Non-Drug; Combo Route) route.     pen needle, diabetic 32 gauge x 5/32" Ndle  Commonly known as: BD ULTRA-FINE RAHUL PEN NEEDLE  Use to inject insulin 5 times daily     potassium chloride 10 MEQ Cpsr  Commonly known as: MICRO-K  Take 10 mEq by mouth once daily.     * predniSONE 2.5 MG tablet  Commonly known as: DELTASONE  Take 1 tablet (2.5 mg total) by mouth once daily.     * predniSONE 2.5 MG tablet  Commonly known as: DELTASONE  TAKE 3 TABLETS (7.5 MG TOTAL) BY MOUTH ONCE DAILY.     * predniSONE 10 MG tablet  Commonly known as: DELTASONE  Take 1 tablet (10 mg total) by mouth once daily.     * predniSONE 5 MG tablet  Commonly known as: DELTASONE  TAKE 1 TABLET BY MOUTH EVERY DAY     PROAIR HFA 90 mcg/actuation inhaler  Generic drug: albuterol     spironolactone 25 MG tablet  Commonly known as: ALDACTONE  TAKE 1 TABLET BY MOUTH EVERY DAY     sulfamethoxazole-trimethoprim " 800-160mg 800-160 mg Tab  Commonly known as: BACTRIM DS  TAKE 1 TABLET BY MOUTH ON MON,WEDS, FRIDAY     TRESIBA FLEXTOUCH U-200 200 unit/mL (3 mL) insulin pen  Generic drug: insulin degludec  Inject 50 Units into the skin 2 (two) times a day.     TRULICITY 4.5 mg/0.5 mL pen injector  Generic drug: dulaglutide  Inject 4.5 mg into the skin every 7 days.     vortioxetine 10 mg Tab  Commonly known as: TRINTELLIX  Take 10 mg by mouth once daily at 6am.         * This list has 4 medication(s) that are the same as other medications prescribed for you. Read the directions carefully, and ask your doctor or other care provider to review them with you.                Indwelling Lines/Drains at time of discharge:   Lines/Drains/Airways     None                 Time spent on the discharge of patient: 39 minutes         The attending portion of this evaluation, treatment, and documentation was performed per Isamar Mcneill MD via Telemedicine AudioVisual using the secure [employer] software platform with 2 way audio/video. The provider was located off-site and the patient is located in the hospital. The aforementioned video software was utilized to document the relevant history and physical exam    Isamar Mcneill MD  Department of Hospital Medicine  [facility] ([place])

## 2022-12-02 ENCOUNTER — PATIENT OUTREACH (OUTPATIENT)
Dept: ADMINISTRATIVE | Facility: OTHER | Age: 67
End: 2022-12-02
Payer: MEDICARE

## 2022-12-30 PROBLEM — I47.10 SVT (SUPRAVENTRICULAR TACHYCARDIA): Status: RESOLVED | Noted: 2022-11-25 | Resolved: 2022-12-30

## 2022-12-30 PROBLEM — A41.9 SEPSIS: Status: RESOLVED | Noted: 2022-11-15 | Resolved: 2022-12-30

## 2022-12-30 PROBLEM — E87.5 HYPERKALEMIA: Status: RESOLVED | Noted: 2022-11-15 | Resolved: 2022-12-30

## 2022-12-30 PROBLEM — N17.9 AKI (ACUTE KIDNEY INJURY): Status: RESOLVED | Noted: 2022-11-15 | Resolved: 2022-12-30

## 2022-12-30 PROBLEM — E87.20 LACTIC ACIDOSIS: Status: RESOLVED | Noted: 2022-11-16 | Resolved: 2022-12-30

## 2022-12-30 PROBLEM — D72.829 LEUKOCYTOSIS: Status: RESOLVED | Noted: 2022-11-16 | Resolved: 2022-12-30

## 2022-12-31 ENCOUNTER — HOSPITAL ENCOUNTER (INPATIENT)
Facility: HOSPITAL | Age: 67
LOS: 19 days | Discharge: HOME HEALTH | DRG: 727 | End: 2023-01-19
Attending: HOSPITALIST | Admitting: HOSPITALIST
Payer: MEDICARE

## 2022-12-31 DIAGNOSIS — N49.3 FOURNIER GANGRENE: ICD-10-CM

## 2022-12-31 DIAGNOSIS — M06.9 RHEUMATOID ARTHRITIS FLARE: Primary | ICD-10-CM

## 2022-12-31 DIAGNOSIS — Z79.4 TYPE 2 DIABETES MELLITUS WITH HYPERGLYCEMIA, WITH LONG-TERM CURRENT USE OF INSULIN: ICD-10-CM

## 2022-12-31 DIAGNOSIS — E11.65 TYPE 2 DIABETES MELLITUS WITH HYPERGLYCEMIA, WITH LONG-TERM CURRENT USE OF INSULIN: ICD-10-CM

## 2022-12-31 LAB — POCT GLUCOSE: 258 MG/DL (ref 70–110)

## 2022-12-31 PROCEDURE — 25000003 PHARM REV CODE 250: Performed by: NURSE PRACTITIONER

## 2022-12-31 PROCEDURE — 63600175 PHARM REV CODE 636 W HCPCS: Performed by: NURSE PRACTITIONER

## 2022-12-31 PROCEDURE — 25000003 PHARM REV CODE 250: Performed by: HOSPITALIST

## 2022-12-31 PROCEDURE — 11000004 HC SNF PRIVATE

## 2022-12-31 RX ORDER — NALOXONE HCL 0.4 MG/ML
0.02 VIAL (ML) INJECTION
Status: DISCONTINUED | OUTPATIENT
Start: 2022-12-31 | End: 2023-01-19 | Stop reason: HOSPADM

## 2022-12-31 RX ORDER — METOPROLOL TARTRATE 50 MG/1
50 TABLET ORAL EVERY 12 HOURS
Status: DISCONTINUED | OUTPATIENT
Start: 2022-12-31 | End: 2023-01-19 | Stop reason: HOSPADM

## 2022-12-31 RX ORDER — INSULIN ASPART 100 [IU]/ML
14 INJECTION, SOLUTION INTRAVENOUS; SUBCUTANEOUS
Status: DISCONTINUED | OUTPATIENT
Start: 2023-01-01 | End: 2023-01-19 | Stop reason: HOSPADM

## 2022-12-31 RX ORDER — DIGOXIN 125 MCG
125 TABLET ORAL DAILY
Status: DISCONTINUED | OUTPATIENT
Start: 2023-01-01 | End: 2023-01-19 | Stop reason: HOSPADM

## 2022-12-31 RX ORDER — INSULIN ASPART 100 [IU]/ML
1-10 INJECTION, SOLUTION INTRAVENOUS; SUBCUTANEOUS
Status: DISCONTINUED | OUTPATIENT
Start: 2022-12-31 | End: 2023-01-19 | Stop reason: HOSPADM

## 2022-12-31 RX ORDER — BENZONATATE 100 MG/1
100 CAPSULE ORAL 3 TIMES DAILY PRN
Status: DISCONTINUED | OUTPATIENT
Start: 2022-12-31 | End: 2023-01-19 | Stop reason: HOSPADM

## 2022-12-31 RX ORDER — ONDANSETRON 4 MG/1
4 TABLET, ORALLY DISINTEGRATING ORAL EVERY 6 HOURS PRN
Status: DISCONTINUED | OUTPATIENT
Start: 2022-12-31 | End: 2023-01-19 | Stop reason: HOSPADM

## 2022-12-31 RX ORDER — FOLIC ACID 1 MG/1
1 TABLET ORAL DAILY
Status: DISCONTINUED | OUTPATIENT
Start: 2023-01-01 | End: 2023-01-19 | Stop reason: HOSPADM

## 2022-12-31 RX ORDER — FAMOTIDINE 20 MG/1
20 TABLET, FILM COATED ORAL 2 TIMES DAILY
Status: DISCONTINUED | OUTPATIENT
Start: 2022-12-31 | End: 2023-01-19 | Stop reason: HOSPADM

## 2022-12-31 RX ORDER — ACETAMINOPHEN 325 MG/1
650 TABLET ORAL EVERY 4 HOURS PRN
Status: DISCONTINUED | OUTPATIENT
Start: 2022-12-31 | End: 2023-01-19 | Stop reason: HOSPADM

## 2022-12-31 RX ORDER — HEPARIN SODIUM 5000 [USP'U]/ML
5000 INJECTION, SOLUTION INTRAVENOUS; SUBCUTANEOUS EVERY 8 HOURS
Status: DISCONTINUED | OUTPATIENT
Start: 2022-12-31 | End: 2023-01-10

## 2022-12-31 RX ORDER — EZETIMIBE 10 MG/1
10 TABLET ORAL DAILY
Status: DISCONTINUED | OUTPATIENT
Start: 2023-01-01 | End: 2023-01-19 | Stop reason: HOSPADM

## 2022-12-31 RX ORDER — POTASSIUM CHLORIDE 20 MEQ/1
40 TABLET, EXTENDED RELEASE ORAL DAILY
Status: DISCONTINUED | OUTPATIENT
Start: 2023-01-01 | End: 2023-01-10

## 2022-12-31 RX ORDER — PROMETHAZINE HYDROCHLORIDE 12.5 MG/1
25 TABLET ORAL EVERY 6 HOURS PRN
Status: DISCONTINUED | OUTPATIENT
Start: 2022-12-31 | End: 2023-01-19 | Stop reason: HOSPADM

## 2022-12-31 RX ORDER — NAPROXEN SODIUM 220 MG/1
81 TABLET, FILM COATED ORAL DAILY
Status: DISCONTINUED | OUTPATIENT
Start: 2023-01-01 | End: 2023-01-19 | Stop reason: HOSPADM

## 2022-12-31 RX ORDER — CALCIUM CARBONATE 200(500)MG
500 TABLET,CHEWABLE ORAL 2 TIMES DAILY PRN
Status: DISCONTINUED | OUTPATIENT
Start: 2022-12-31 | End: 2023-01-19 | Stop reason: HOSPADM

## 2022-12-31 RX ORDER — IBUPROFEN 200 MG
24 TABLET ORAL
Status: DISCONTINUED | OUTPATIENT
Start: 2022-12-31 | End: 2023-01-19 | Stop reason: HOSPADM

## 2022-12-31 RX ORDER — ESCITALOPRAM OXALATE 10 MG/1
20 TABLET ORAL NIGHTLY
Status: DISCONTINUED | OUTPATIENT
Start: 2022-12-31 | End: 2023-01-19 | Stop reason: HOSPADM

## 2022-12-31 RX ORDER — GLUCAGON 1 MG
1 KIT INJECTION
Status: DISCONTINUED | OUTPATIENT
Start: 2022-12-31 | End: 2023-01-19 | Stop reason: HOSPADM

## 2022-12-31 RX ORDER — BISACODYL 10 MG
10 SUPPOSITORY, RECTAL RECTAL DAILY PRN
Status: DISCONTINUED | OUTPATIENT
Start: 2022-12-31 | End: 2023-01-19 | Stop reason: HOSPADM

## 2022-12-31 RX ORDER — TALC
6 POWDER (GRAM) TOPICAL NIGHTLY PRN
Status: DISCONTINUED | OUTPATIENT
Start: 2022-12-31 | End: 2023-01-19 | Stop reason: HOSPADM

## 2022-12-31 RX ORDER — IPRATROPIUM BROMIDE AND ALBUTEROL SULFATE 2.5; .5 MG/3ML; MG/3ML
3 SOLUTION RESPIRATORY (INHALATION) EVERY 4 HOURS PRN
Status: DISCONTINUED | OUTPATIENT
Start: 2022-12-31 | End: 2023-01-19 | Stop reason: HOSPADM

## 2022-12-31 RX ORDER — IBUPROFEN 200 MG
16 TABLET ORAL
Status: DISCONTINUED | OUTPATIENT
Start: 2022-12-31 | End: 2023-01-19 | Stop reason: HOSPADM

## 2022-12-31 RX ORDER — AMOXICILLIN 250 MG
1 CAPSULE ORAL 2 TIMES DAILY
Status: DISCONTINUED | OUTPATIENT
Start: 2022-12-31 | End: 2023-01-19 | Stop reason: HOSPADM

## 2022-12-31 RX ORDER — DILTIAZEM HYDROCHLORIDE 120 MG/1
120 CAPSULE, COATED, EXTENDED RELEASE ORAL EVERY 12 HOURS
Status: DISCONTINUED | OUTPATIENT
Start: 2022-12-31 | End: 2023-01-19 | Stop reason: HOSPADM

## 2022-12-31 RX ORDER — LIDOCAINE 50 MG/G
1 PATCH TOPICAL
Status: DISCONTINUED | OUTPATIENT
Start: 2023-01-01 | End: 2023-01-19 | Stop reason: HOSPADM

## 2022-12-31 RX ORDER — OXYCODONE HYDROCHLORIDE 5 MG/1
5 TABLET ORAL EVERY 6 HOURS PRN
Status: DISCONTINUED | OUTPATIENT
Start: 2022-12-31 | End: 2023-01-01

## 2022-12-31 RX ORDER — FUROSEMIDE 40 MG/1
80 TABLET ORAL DAILY
Status: DISCONTINUED | OUTPATIENT
Start: 2023-01-01 | End: 2023-01-03

## 2022-12-31 RX ORDER — HYDROXYZINE HYDROCHLORIDE 25 MG/1
25 TABLET, FILM COATED ORAL 3 TIMES DAILY PRN
Status: DISCONTINUED | OUTPATIENT
Start: 2022-12-31 | End: 2023-01-19 | Stop reason: HOSPADM

## 2022-12-31 RX ADMIN — ESCITALOPRAM OXALATE 20 MG: 10 TABLET ORAL at 10:12

## 2022-12-31 RX ADMIN — METOPROLOL TARTRATE 50 MG: 50 TABLET, FILM COATED ORAL at 10:12

## 2022-12-31 RX ADMIN — ACETAMINOPHEN 650 MG: 325 TABLET ORAL at 06:12

## 2022-12-31 RX ADMIN — INSULIN DETEMIR 5 UNITS: 100 INJECTION, SOLUTION SUBCUTANEOUS at 09:12

## 2022-12-31 RX ADMIN — DILTIAZEM HYDROCHLORIDE 120 MG: 120 CAPSULE, COATED, EXTENDED RELEASE ORAL at 10:12

## 2022-12-31 RX ADMIN — FAMOTIDINE 20 MG: 20 TABLET ORAL at 10:12

## 2022-12-31 RX ADMIN — HEPARIN SODIUM 5000 UNITS: 5000 INJECTION INTRAVENOUS; SUBCUTANEOUS at 10:12

## 2022-12-31 RX ADMIN — OXYCODONE 5 MG: 5 TABLET ORAL at 10:12

## 2023-01-01 LAB
GLUCOSE SERPL-MCNC: 271 MG/DL (ref 70–110)
POCT GLUCOSE: 227 MG/DL (ref 70–110)
POCT GLUCOSE: 236 MG/DL (ref 70–110)

## 2023-01-01 PROCEDURE — 25000003 PHARM REV CODE 250: Performed by: HOSPITALIST

## 2023-01-01 PROCEDURE — 11000004 HC SNF PRIVATE

## 2023-01-01 PROCEDURE — 25000003 PHARM REV CODE 250: Performed by: NURSE PRACTITIONER

## 2023-01-01 PROCEDURE — 63600175 PHARM REV CODE 636 W HCPCS: Performed by: NURSE PRACTITIONER

## 2023-01-01 RX ORDER — OXYCODONE HYDROCHLORIDE 10 MG/1
10 TABLET ORAL EVERY 4 HOURS PRN
Status: DISCONTINUED | OUTPATIENT
Start: 2023-01-01 | End: 2023-01-01

## 2023-01-01 RX ORDER — GENTAMICIN SULFATE 3 MG/ML
2 SOLUTION/ DROPS OPHTHALMIC 3 TIMES DAILY
Status: DISPENSED | OUTPATIENT
Start: 2023-01-01 | End: 2023-01-06

## 2023-01-01 RX ORDER — CIPROFLOXACIN AND DEXAMETHASONE 3; 1 MG/ML; MG/ML
4 SUSPENSION/ DROPS AURICULAR (OTIC) 2 TIMES DAILY
Status: DISCONTINUED | OUTPATIENT
Start: 2023-01-01 | End: 2023-01-03

## 2023-01-01 RX ORDER — CETIRIZINE HYDROCHLORIDE 5 MG/1
10 TABLET ORAL DAILY
Status: DISCONTINUED | OUTPATIENT
Start: 2023-01-01 | End: 2023-01-19 | Stop reason: HOSPADM

## 2023-01-01 RX ORDER — CIPROFLOXACIN HYDROCHLORIDE 3 MG/ML
2 SOLUTION/ DROPS OPHTHALMIC 3 TIMES DAILY
Status: DISCONTINUED | OUTPATIENT
Start: 2023-01-01 | End: 2023-01-01

## 2023-01-01 RX ORDER — DEXAMETHASONE SODIUM PHOSPHATE 1 MG/ML
2 SOLUTION/ DROPS OPHTHALMIC 3 TIMES DAILY
Status: DISCONTINUED | OUTPATIENT
Start: 2023-01-01 | End: 2023-01-01

## 2023-01-01 RX ORDER — OXYCODONE HCL 10 MG/1
10 TABLET, FILM COATED, EXTENDED RELEASE ORAL EVERY 12 HOURS
Status: DISCONTINUED | OUTPATIENT
Start: 2023-01-01 | End: 2023-01-11

## 2023-01-01 RX ADMIN — ONDANSETRON 4 MG: 4 TABLET, ORALLY DISINTEGRATING ORAL at 02:01

## 2023-01-01 RX ADMIN — FAMOTIDINE 20 MG: 20 TABLET ORAL at 09:01

## 2023-01-01 RX ADMIN — ASPIRIN 81 MG 81 MG: 81 TABLET ORAL at 09:01

## 2023-01-01 RX ADMIN — DILTIAZEM HYDROCHLORIDE 120 MG: 120 CAPSULE, COATED, EXTENDED RELEASE ORAL at 09:01

## 2023-01-01 RX ADMIN — HEPARIN SODIUM 5000 UNITS: 5000 INJECTION INTRAVENOUS; SUBCUTANEOUS at 09:01

## 2023-01-01 RX ADMIN — CIPROFLOXACIN AND DEXAMETHASONE 4 DROP: 3; 1 SUSPENSION/ DROPS AURICULAR (OTIC) at 11:01

## 2023-01-01 RX ADMIN — HEPARIN SODIUM 5000 UNITS: 5000 INJECTION INTRAVENOUS; SUBCUTANEOUS at 06:01

## 2023-01-01 RX ADMIN — INSULIN DETEMIR 5 UNITS: 100 INJECTION, SOLUTION SUBCUTANEOUS at 11:01

## 2023-01-01 RX ADMIN — OXYCODONE 5 MG: 5 TABLET ORAL at 03:01

## 2023-01-01 RX ADMIN — POTASSIUM CHLORIDE 40 MEQ: 1500 TABLET, EXTENDED RELEASE ORAL at 09:01

## 2023-01-01 RX ADMIN — METOPROLOL TARTRATE 50 MG: 50 TABLET, FILM COATED ORAL at 09:01

## 2023-01-01 RX ADMIN — INSULIN ASPART 4 UNITS: 100 INJECTION, SOLUTION INTRAVENOUS; SUBCUTANEOUS at 09:01

## 2023-01-01 RX ADMIN — ACETAMINOPHEN 650 MG: 325 TABLET ORAL at 02:01

## 2023-01-01 RX ADMIN — SENNOSIDES AND DOCUSATE SODIUM 1 TABLET: 50; 8.6 TABLET ORAL at 09:01

## 2023-01-01 RX ADMIN — OXYCODONE 5 MG: 5 TABLET ORAL at 09:01

## 2023-01-01 RX ADMIN — INSULIN ASPART 14 UNITS: 100 INJECTION, SOLUTION INTRAVENOUS; SUBCUTANEOUS at 07:01

## 2023-01-01 RX ADMIN — CIPROFLOXACIN AND DEXAMETHASONE 4 DROP: 3; 1 SUSPENSION/ DROPS AURICULAR (OTIC) at 09:01

## 2023-01-01 RX ADMIN — INSULIN ASPART 3 UNITS: 100 INJECTION, SOLUTION INTRAVENOUS; SUBCUTANEOUS at 03:01

## 2023-01-01 RX ADMIN — OXYCODONE HYDROCHLORIDE 10 MG: 10 TABLET, FILM COATED, EXTENDED RELEASE ORAL at 09:01

## 2023-01-01 RX ADMIN — OXYCODONE HYDROCHLORIDE 15 MG: 5 TABLET ORAL at 05:01

## 2023-01-01 RX ADMIN — GENTAMICIN SULFATE 2 DROP: 3 SOLUTION OPHTHALMIC at 09:01

## 2023-01-01 RX ADMIN — OXYCODONE HYDROCHLORIDE 10 MG: 10 TABLET, FILM COATED, EXTENDED RELEASE ORAL at 12:01

## 2023-01-01 RX ADMIN — THERA TABS 1 TABLET: TAB at 09:01

## 2023-01-01 RX ADMIN — ESCITALOPRAM OXALATE 20 MG: 10 TABLET ORAL at 09:01

## 2023-01-01 NOTE — CARE UPDATE
Contacted Dr. Chun regarding PRN pain medications, re-ordered Oxycodone PRN Q12hrs. Advised to refer additional medications to day team.   
14 yo male pmh adhd comes to ed with suicidal thoughts of hurting himself this afternoon; as per family, pt refused to go to work today and said  he would hurt himself ; pt brought in by family for evaluaton

## 2023-01-01 NOTE — NURSING
Pt declined to take is insulin until after he ate is dinner. He finished eating at 01:00. CBG was 236. Levemir and no novolog given due to patient refusing.

## 2023-01-01 NOTE — PROGRESS NOTES
Patient admitted via w/c aaox3-4 forgetful at times.pt transferred to bed with minimal assistance. Patient instructed on fall precautions, call light this unit and pt's rights. Pt verbalized understanding. Patient' took own blood glucose and was 194, patient refused to eat dinner served, verbalized he might eat it later tonight. Left ear and left jaw area with mild swelling, rash to sacral area present, scrotal wound reddened, wound care done before transfer to this unit, site covered with abd pad. Dry skin to ble no skin breakdown to legs or heels. Call light in reach, pt instructed to call prn.

## 2023-01-02 LAB
POCT GLUCOSE: 142 MG/DL (ref 70–110)
POCT GLUCOSE: 146 MG/DL (ref 70–110)
POCT GLUCOSE: 165 MG/DL (ref 70–110)
POCT GLUCOSE: 222 MG/DL (ref 70–110)

## 2023-01-02 PROCEDURE — 97116 GAIT TRAINING THERAPY: CPT

## 2023-01-02 PROCEDURE — 97535 SELF CARE MNGMENT TRAINING: CPT

## 2023-01-02 PROCEDURE — 97162 PT EVAL MOD COMPLEX 30 MIN: CPT

## 2023-01-02 PROCEDURE — 25000003 PHARM REV CODE 250: Performed by: NURSE PRACTITIONER

## 2023-01-02 PROCEDURE — 63600175 PHARM REV CODE 636 W HCPCS: Performed by: NURSE PRACTITIONER

## 2023-01-02 PROCEDURE — 97530 THERAPEUTIC ACTIVITIES: CPT

## 2023-01-02 PROCEDURE — 97166 OT EVAL MOD COMPLEX 45 MIN: CPT

## 2023-01-02 PROCEDURE — 11000004 HC SNF PRIVATE

## 2023-01-02 RX ORDER — PREDNISONE 5 MG/1
5 TABLET ORAL DAILY
Status: DISCONTINUED | OUTPATIENT
Start: 2023-01-07 | End: 2023-01-03

## 2023-01-02 RX ORDER — PREDNISONE 10 MG/1
10 TABLET ORAL DAILY
Status: DISCONTINUED | OUTPATIENT
Start: 2023-01-02 | End: 2023-01-03

## 2023-01-02 RX ADMIN — FAMOTIDINE 20 MG: 20 TABLET ORAL at 08:01

## 2023-01-02 RX ADMIN — GENTAMICIN SULFATE 2 DROP: 3 SOLUTION OPHTHALMIC at 05:01

## 2023-01-02 RX ADMIN — METOPROLOL TARTRATE 50 MG: 50 TABLET, FILM COATED ORAL at 08:01

## 2023-01-02 RX ADMIN — HEPARIN SODIUM 5000 UNITS: 5000 INJECTION INTRAVENOUS; SUBCUTANEOUS at 10:01

## 2023-01-02 RX ADMIN — GENTAMICIN SULFATE 2 DROP: 3 SOLUTION OPHTHALMIC at 08:01

## 2023-01-02 RX ADMIN — THERA TABS 1 TABLET: TAB at 08:01

## 2023-01-02 RX ADMIN — OXYCODONE HYDROCHLORIDE 15 MG: 5 TABLET ORAL at 04:01

## 2023-01-02 RX ADMIN — POTASSIUM CHLORIDE 40 MEQ: 1500 TABLET, EXTENDED RELEASE ORAL at 08:01

## 2023-01-02 RX ADMIN — OXYCODONE HYDROCHLORIDE 10 MG: 10 TABLET, FILM COATED, EXTENDED RELEASE ORAL at 08:01

## 2023-01-02 RX ADMIN — INSULIN ASPART 14 UNITS: 100 INJECTION, SOLUTION INTRAVENOUS; SUBCUTANEOUS at 05:01

## 2023-01-02 RX ADMIN — ASPIRIN 81 MG 81 MG: 81 TABLET ORAL at 08:01

## 2023-01-02 RX ADMIN — CIPROFLOXACIN AND DEXAMETHASONE 4 DROP: 3; 1 SUSPENSION/ DROPS AURICULAR (OTIC) at 08:01

## 2023-01-02 RX ADMIN — SENNOSIDES AND DOCUSATE SODIUM 1 TABLET: 50; 8.6 TABLET ORAL at 08:01

## 2023-01-02 RX ADMIN — DILTIAZEM HYDROCHLORIDE 120 MG: 120 CAPSULE, COATED, EXTENDED RELEASE ORAL at 08:01

## 2023-01-02 RX ADMIN — DIGOXIN 125 MCG: 125 TABLET ORAL at 08:01

## 2023-01-02 RX ADMIN — INSULIN DETEMIR 5 UNITS: 100 INJECTION, SOLUTION SUBCUTANEOUS at 08:01

## 2023-01-02 RX ADMIN — INSULIN DETEMIR 44 UNITS: 100 INJECTION, SOLUTION SUBCUTANEOUS at 09:01

## 2023-01-02 RX ADMIN — INSULIN ASPART 4 UNITS: 100 INJECTION, SOLUTION INTRAVENOUS; SUBCUTANEOUS at 08:01

## 2023-01-02 RX ADMIN — PREDNISONE 10 MG: 10 TABLET ORAL at 04:01

## 2023-01-02 RX ADMIN — HEPARIN SODIUM 5000 UNITS: 5000 INJECTION INTRAVENOUS; SUBCUTANEOUS at 05:01

## 2023-01-02 RX ADMIN — FOLIC ACID 1 MG: 1 TABLET ORAL at 08:01

## 2023-01-02 RX ADMIN — OXYCODONE HYDROCHLORIDE 15 MG: 5 TABLET ORAL at 12:01

## 2023-01-02 RX ADMIN — INSULIN ASPART 14 UNITS: 100 INJECTION, SOLUTION INTRAVENOUS; SUBCUTANEOUS at 08:01

## 2023-01-02 RX ADMIN — OXYCODONE HYDROCHLORIDE 15 MG: 5 TABLET ORAL at 05:01

## 2023-01-02 RX ADMIN — ESCITALOPRAM OXALATE 20 MG: 10 TABLET ORAL at 08:01

## 2023-01-02 RX ADMIN — OXYCODONE HYDROCHLORIDE 15 MG: 5 TABLET ORAL at 10:01

## 2023-01-02 RX ADMIN — CIPROFLOXACIN AND DEXAMETHASONE 4 DROP: 3; 1 SUSPENSION/ DROPS AURICULAR (OTIC) at 09:01

## 2023-01-02 NOTE — PLAN OF CARE
Problem: Occupational Therapy  Goal: Occupational Therapy Goal  Description: Goals to be met by: 1/30/23     Patient will increase functional independence with ADLs by performing:    LE Dressing with Modified Will.  Grooming while standing at sink with Supervision.  Toileting from toilet with Supervision for hygiene and clothing management.   Bathing from  shower chair/bench with Supervision.  Step transfer with Modified Will  Upper extremity exercise program, with Will.   Caregiver will be educated on level of assistance required to safely perform self care and functional transfers.    Outcome: Ongoing, Progressing

## 2023-01-02 NOTE — PT/OT/SLP EVAL
Physical Therapy Evaluation and Treatment    Patient Name:  Nithin Wagner   MRN:  [id number]  Admit Date: 12/31/2022  Admitting Diagnosis: sepsis, kami gangrene  Recent Surgeries: INCISION AND DRAINAGE, SCROTUM     General Precautions: Standard, fall   Orthopedic Precautions:N/A   Braces: N/A     Recommendations:     Discharge Recommendations:  home health PT   Level of Assistance Recommended: Intermittent assistance   Discharge Equipment Recommendations: walker, rolling (TBD)   Barriers to discharge: Decreased caregiver support, Inaccessible home environment    Assessment:     Nithin Wagner is a 67 y.o. male admitted with a medical diagnosis of sepsis; kami ganagrene. Patient's evaluation was limited due to orthostatic hypotension. Patient became hypotensive after walking 23 ft. Patient's hypotension resolved when sitting down. He was able to walk an additional feet and stand for 2 minutes before becoming hypotensive again. Patient propelled wheelchair 169 ft with bilateral upper extremities and set-up assistance.  Performance deficits affecting patient's function include the following:  weakness, impaired functional mobility, decreased safety awareness, impaired cardiopulmonary response to activity, impaired endurance, gait instability, pain, impaired balance, impaired self care skills, decreased lower extremity function.  Patient will benefit from skilled physical therapy services to address the mentioned deficits in order to increase safety and independence with functional mobility.    Rehab Potential is fair     Activity Tolerance: Fair    Plan:     Patient to be seen 6 x/week to address the above listed problems via gait training, therapeutic activities, therapeutic exercises, neuromuscular re-education     Plan of Care Expires: 02/01/23  Plan of Care Reviewed with: patient    Social History   Living Environment:  Patient lives alone in [facility] with about 6 steps to enter with bilateral handrails (too wide  "to reacher at the same time). Patient has flight of stairs to bedroom and mini kitchen on second floor. Patient's nephew is in and out of the home; however, patient does not usually converse with nephew.   Pt has a tub/shower combo with folding chair.    Prior Level of Function:   independent with mobility and ADLs. Patient would periodically would occasionally use cane or axillary crutches due to arthritic pain .    DME used: oxygen, cane, straight, crutches, axillary (transport chair)  Patient reports a few falls in the past 3 months. Patient contributes falls to dizziness.     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   Managing Medicines/Managing Home: yes.   Hobbies: talk to neighbors, watch birds.    Upon discharge, patient will have assistance from no one.      Subjective     Communicated with RN prior to session. Patient found supine upon PT entry to room. Patient is alone during session.  Pt is agreeable to evaluation.     Chief Complaint: scrotal pain, right shoulder pain, right knee pain, and left thumb pain   Patient/Family Comments/goals: "to improve strength, balance, flexibility, and agility" "to be as healthy as possible"  Pain/Comfort:  Pain Rating 1: 6/10  Location 1: scrotum  Pain Addressed 1: Pre-medicate for activity, Cessation of Activity, Reposition, Distraction  Pain Rating Post-Intervention 1: 6/10  Pain Rating 2: 8/10  Location 2:  (right shoulder, right knee, and left thumb (arthritic pain))  Pain Addressed 2: Pre-medicate for activity, Reposition, Distraction, Cessation of Activity  Pain Rating Post-Intervention 2: 8/10    Patients cultural, spiritual, Roman Catholic conflicts given the current situation: no    Objective:     Exams:  Cognition:      Alert, Oriented X 4 to Person, Place, Time, and Situation, and Cooperative  Command following: Follows multistep  commands  Fluency: clear/fluent  Skin integrity: Dry  Edema: None noted   Sensation: -       " Intact  light/touch bilateral LEs    RLE ROM:   Hip Flexion: WFL  Knee Extension: ~90% of functional range  Knee Flexion: ~ 75% of functional range  Ankle Dorsiflexion: WFL  Ankle Plantarflexion: WFL    LLE ROM:   Hip Flexion: WFL  Knee Extension: WFL  Knee Flexion: WFL  Ankle Dorsiflexion: WFL  Ankle Plantarflexion: WFL    RLE Strength:    Hip Flexion: 4/5; pain limited  Knee Flexion: 4/5; pain limited   Knee Extension: 4/5; pain limited  Ankle Dorsiflexion: 5/5  Ankle Plantarflexion: 5/5    LLE Strength:   Hip Flexion: 4+/5  Knee Flexion: 4+/5  Knee Extension: 4+/5  Ankle Dorsiflexion: 5/5  Ankle Plantarflexion: 5/5    Coordination: heel to shin intact bilateral LEs  Postural Exam: Patient presents with the following abnormalities: Head forward    Outcome Measures  AM-PAC 6 CLICK MOBILITY Score:18     Functional Mobility:    Bed Mobility:    Rolling Left: modified independence with use of bed rail   Left side lying to sit: modified independence with use of bed rail  Seated scooting towards EOB: modified independence with use of bed rail   Sit to supine: modified independence      Transfers:    Sit to Stand:  stand by assistance   Stand to Sit: stand by assistance   Bed to Chair: stand by assistance  using Stand Pivot. Chair on pt's right side.   Chair to Bed:  stand by assistance  using Stand Pivot. Bed on pt's left side.    Wheelchair Mobility:   Pt propelled  manual  wheelchair x 169 feet on  level tile  with bilateral upper extremities and Set-up Assistance.     Gait: Prior to walking patient's /75   Patient ambulates 23 feet on even, indoor carroll with no assistive device and standy by assistance and contact guard.   Gait Deviation(s): decreased right step length with increased right knee flexion in single leg stance and decreased gilberto   Impairments due to: impaired balance and pain  Comments: Patient's gait distance limited by lightheadedness. Patient's BP 93/57. Patient's lightheadedness resolves  when patient sits down.     Gait:   Patient ambulates 46 feet on even, indoor carroll with rolling walker and contact guard.   Gait Deviation(s): decreased right step length with increased right knee flexion in single leg stance and decreased gilberto   Impairments due to: impaired balance, pain, and decreased strength  Comments: Patient's gait distance limited by lightheadedness. Unable to get reading on mechanical blood pressure machine due to low blood pressure. Patient's lightheadedness resolves when patient sits down.       01/02/23 5171   Prior Functioning: Everyday Activities   Self Care Independent   Indoor Mobility (Ambulation) Independent   Stairs Independent   Functional Cognition Independent   Prior Device Use None of the given options   Roll Left and Right   Assistance Needed Independent;Adaptive equipment   Comment with bed rail   CARE Score - Roll Left and Right 6   Sit to Lying   Assistance Needed Independent   CARE Score - Sit to Lying 6   Lying to Sitting on Side of Bed   Assistance Needed Independent   CARE Score - Lying to Sitting on Side of Bed 6   Sit to Stand   Assistance Needed Supervision   CARE Score - Sit to Stand 4   Chair/Bed-to-Chair Transfer   Assistance Needed Supervision   CARE Score - Chair/Bed-to-Chair Transfer 4   Car Transfer   Reason if not Attempted Environmental limitations   CARE Score - Car Transfer 10   Walk 10 Feet   Assistance Needed Incidental touching   Comment CGA   CARE Score - Walk 10 Feet 4   Walk 50 Feet with Two Turns   Comment Patient becomes orthostatic after ambulating 46 ft   Reason if not Attempted Safety concerns   CARE Score - Walk 50 Feet with Two Turns 88   Walk 150 Feet   Comment Patient has orthostatic hypotension after ambulating 46 feet   Reason if not Attempted Safety concerns   CARE Score - Walk 150 Feet 88   Walking 10 Feet on Uneven Surfaces   Comment orthostatic hypotension   Reason if not Attempted Safety concerns   CARE Score - Walking 10 Feet  on Uneven Surfaces 88   1 Step (Curb)   Comment orthostatic hypotension   Reason if not Attempted Safety concerns   CARE Score - 1 Step (Curb) 88   4 Steps   Comment orthostatic hypotension   Reason if not Attempted Safety concerns   CARE Score - 4 Steps 88   12 Steps   Comment orthostatic hypotension   Reason if not Attempted Safety concerns   CARE Score - 12 Steps 88   Picking Up Object   Comment orthostatic hypotension   Reason if not Attempted Safety concerns   CARE Score - Picking Up Object 88   Uses a Wheelchair/Scooter?   Uses a Wheelchair/Scooter? Yes   Wheel 50 Feet with Two Turns   Assistance Needed Supervision   CARE Score - Wheel 50 Feet with Two Turns 4   Type of Wheelchair/Scooter Manual   Wheel 150 Feet   Assistance Needed Supervision   CARE Score - Wheel 150 Feet 4   Type of Wheelchair/Scooter Manual       Education:   Patient educated on PT POC and role of PT in skilled nursing facility  Patient educated on the importance of mobility to prevent functional decline during rehabilitation stay  Patient educated on orthostatic hypotension and encouraged to sit up in chair throughout the day  Patient was instructed to utilize staff assistance for mobility/transfers  Patient is appropriate to transfer and ambulate in room with contact guard assistance with RN/PCT  White board updated to include patient's safest level of mobility with staff assistance  Patient educated on Fall risk, gait training, home safety, plan of care, and transfer training by explanation.  Patient was receptive to education and needs reinforcement.       Patient left supine with call button in reach and RN notified.    GOALS:   Multidisciplinary Problems       Physical Therapy Goals          Problem: Physical Therapy    Goal Priority Disciplines Outcome Goal Variances Interventions   Physical Therapy Goal     PT, PT/OT Ongoing, Progressing     Description: Goals to be met by: 1/30/2023     Patient will increase functional independence  with mobility by performin. Sit to stand transfer with Modified Sterling  2. Bed to chair transfer with Modified Sterling using No Assistive Device  3. Gait  x 150 feet with Modified Sterling using rolling walker or cane.   4. Wheelchair propulsion x150 feet with Modified Sterling   5. Stand for 10 minutes with Sterling                         History:     Past Medical History:   Diagnosis Date    Arthritis     Atrial myxoma     CHF (congestive heart failure)     Coronary atherosclerosis     Diabetes mellitus, type 2     Difficult intubation     Hepatitis B     Hyperlipidemia     Hypertension     Non-alcoholic fatty liver disease     Rheumatoid arthritis     Rheumatoid arthritis flare 2021    Stroke     TIA    Systolic heart failure        Past Surgical History:   Procedure Laterality Date    CORONARY ANGIOGRAPHY N/A 3/10/2021    Procedure: ANGIOGRAM, CORONARY ARTERY - right radial;  Surgeon: Shemar Dempsey MD;  Location: [facility] CATH LAB;  Service: Cardiology;  Laterality: N/A;    CORONARY STENT PLACEMENT  03/10/2021    prox-mid RCA [place] 4.5 x 26 mm, 4.5 x 12 mm    INCISION AND DRAINAGE OF SCROTUM N/A 11/15/2022    Procedure: INCISION AND DRAINAGE, SCROTUM;  Surgeon: William Hatch MD;  Location: [facility] OR;  Service: Urology;  Laterality: N/A;    INCISION AND DRAINAGE OF SCROTUM N/A 2022    Procedure: INCISION AND DRAINAGE, SCROTUM;  Surgeon: William Hatch MD;  Location: [facility];  Service: Urology;  Laterality: N/A;    LUNG LOBECTOMY Right 2008    RUL lobectomy after removal of atrial myxoma    PLEURA BIOPSY      RESECTION OF ATRIAL MYXOMA         Time Tracking:     PT Received On: 23  PT Start Time: 1340     PT Stop Time: 1438  PT Total Time (min): 58 min     Billable Minutes: Evaluation 1 procedure and Gait Training 15 mins      2023

## 2023-01-02 NOTE — PLAN OF CARE
Problem: Physical Therapy  Goal: Physical Therapy Goal  Description: Goals to be met by: 2023     Patient will increase functional independence with mobility by performin. Sit to stand transfer with Modified [place]  2. Bed to chair transfer with Modified [place] using No Assistive Device  3. Gait  x 150 feet with Modified [place] using rolling walker or cane.   4. Wheelchair propulsion x150 feet with Modified [place]   5. Stand for 10 minutes with [place]    Outcome: Ongoing, Progressing     PT evaluated Nithin Wagner today. Patient's evaluation was limited by orthostatic hypotension when standing and walking. PT will add curb and stairs goals when patient's orthostatic hypotension is controlled.

## 2023-01-02 NOTE — PT/OT/SLP EVAL
Occupational Therapy   Evaluation and Treatment    Name: Nithin Wagner  MRN: [id number]  Admit Date: 12/31/2022  Admitting Diagnosis: sepsis, kami gangrene  Recent Surgeries: INCISION AND DRAINAGE, SCROTUM      General Precautions: Standard, fall  Orthopedic Precautions:N/A   Braces: N/A    Recommendations:     Discharge Recommendations: home health OT  Level of Assistance Recommended: 24 hours light assistance  Discharge Equipment Recommendations:   (TBD)  Barriers to discharge:  Inaccessible home environment    Assessment:     Nithin Wagner is a 67 y.o. male with a medical diagnosis of sepsis, kami grangrene.  He presents with the following performance deficits affecting function: weakness, impaired endurance, impaired self care skills, impaired functional mobility, decreased safety awareness, pain, gait instability, impaired balance, impaired cardiopulmonary response to activity.   Pt tolerated session well and without incident, but he continues to require assistance to safely perform self-care tasks and mobility.  He would continue to benefit from skilled OT services at SNF to maximize his gains in functional independence.      Rehab Potential is good    Activity Tolerance: Fair    Plan:     Patient to be seen 5 x/week to address the above listed problems via self-care/home management, therapeutic activities, therapeutic exercises  Plan of Care Expires: 01/31/23  Plan of Care Reviewed with: patient    Subjective     Chief Complaint: L wrist and shoulder pain, vertigo  Patient/Family Comments/goals: return to PLOF    Occupational Profile:  Living Environment: Pt lives in a 2-story duplex with his grand-nephew. Pt's bedroom and bathroom are on the 2nd floor. Pt has 6STE the home and a flight of stairs up to bedroom.   Previous level of function: independent - Mod I with mobility and ADLs. 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, drives   Equipment Used at Home: oxygen, cane, straight, crutches, other (see comments) (transport chair)  Assistance upon Discharge: no one    Pain/Comfort:  Pain Rating 1: 6/10  Location - Side 1: Right  Location - Orientation 1: generalized  Location 1: knee  Pain Addressed 1: Reposition, Distraction  Pain Rating Post-Intervention 1: 6/10  Pain Rating 2: 6/10  Location - Side 2: Left  Location - Orientation 2: generalized  Location 2: wrist  Pain Addressed 2: Reposition, Distraction  Pain Rating Post-Intervention 2: 6/10    Patients cultural, spiritual, Yazidism conflicts given the current situation: yes    Objective:     Communicated with: Nsg prior to session.  Patient found supine with  (no lines attached) upon OT entry to room.    Occupational Performance:     Eating   Assistance Needed Set-up / clean-up   Physical Assistance Level No physical assistance   CARE Score - Eating 5   Oral Hygiene   Assistance Needed Supervision   Physical Assistance Level No physical assistance   Comment to perform oral cares and hair grooming while seated in w/c at sink   CARE Score - Oral Hygiene 4   Toileting Hygiene   Assistance Needed Incidental touching   Physical Assistance Level 25% or less   Comment per clinical judgment, pt would require CGA for steadying to perform standing pericares   CARE Score - Toileting Hygiene 3   Shower/Bathe Self   Assistance Needed Incidental touching   Physical Assistance Level 25% or less   Comment per clinical judgment, pt would be able to perform seated bathing task w/ supervision, but would require CGA to stand to wash rear   CARE Score - Shower/Bathe Self 3   Upper Body Dressing   Assistance Needed Set-up / clean-up   Physical Assistance Level No physical assistance   Comment supervision to doff/don t-shirt while seated   CARE Score - Upper Body Dressing 5   Lower Body Dressing   Assistance Needed Incidental touching   Physical  Assistance Level 25% or less   Comment CGA for steadying while standing to pull over hips; pt able to thread BLE through shorts IND'ly while seated   CARE Score - Lower Body Dressing 3   Putting On/Taking Off Footwear   Assistance Needed Physical assistance   Physical Assistance Level 51%-75%   Comment pt unable to reach toes, requiring A to don socks   CARE Score - Putting On/Taking Off Footwear 2   Roll Left and Right   Assistance Needed Independent   CARE Score - Roll Left and Right 6   Sit to Lying   Assistance Needed Supervision   Physical Assistance Level No physical assistance   Comment HOB flat   CARE Score - Sit to Lying 4   Lying to Sitting on Side of Bed   Assistance Needed Supervision   Physical Assistance Level No physical assistance   Comment HOB flat   CARE Score - Lying to Sitting on Side of Bed 4   Sit to Stand   Assistance Needed Incidental touching   Physical Assistance Level 25% or less   Comment close SBA-CGA due to balance concerns to stand from EOB x2, w/c x1, and recliner x1   CARE Score - Sit to Stand 3   Chair/Bed-to-Chair Transfer   Assistance Needed Incidental touching   Physical Assistance Level 25% or less   Comment CGA w/ no AD for step transfer bed > wheelchair. Pt pushing wheelchair for balance while performing transfer wheelchair > recliner w/ close SBA   CARE Score - Chair/Bed-to-Chair Transfer 3   Toilet Transfer   Assistance Needed Incidental touching   Physical Assistance Level No physical assistance   Comment close SBA with use of grab bars to perform step transfer   CARE Score - Toilet Transfer 4   Walk 10 Feet   Assistance Needed Incidental touching   Physical Assistance Level 25% or less   Comment close SBA-CGA to perform in-room ambulation; pt initially using no AD but as he fatigued, pt pushing w/c   CARE Score - Walk 10 Feet 3       Cognitive/Visual Perceptual:  Cognitive/Psychosocial Skills:     -       Oriented to: Person, Place, Time, and Situation   -       Follows  Commands/attention:Follows multistep  commands  -       Communication: clear/fluent  -       Memory: pt with impaired STM and frequently giving differing information during session regarding PLOF  -       Safety awareness/insight to disability: impaired   -       Mood/Affect/Coping skills/emotional control: Appropriate to situation  Visual/Perceptual:      -Intact      Physical Exam:  Balance:    -       Dynamic sitting: supervision  Postural examination/scapula alignment: -       Rounded shoulders  -       Forward head  -       Kyphosis  Skin integrity: Visible skin intact  Edema:  None noted  Sensation:    -       Intact  Motor Planning:    -       WFL  Dominant hand:    -       Right  Upper Extremity Range of Motion:     -       Right Upper Extremity: WFL  -       Left Upper Extremity: WFL  Upper Extremity Strength:    -       Right Upper Extremity: shoulder 3/5, elbow and wrist 4/5  -       Left Upper Extremity: grossling 4/5   Strength:    -       Right Upper Extremity: WFL  -       Left Upper Extremity: WFL  Fine Motor Coordination:    -       Intact    AMPAC 6 Click ADL:  AMPAC Total Score: 21    Treatment & Education:  Educated pt on increasing amount of time spent sitting upright/OOB to allow body to adjust to upright positioning to assist with BP/dizziness management. Pt initially agreeable to sitting up in recliner at end of session. Once in recliner, pt requesting to return to bed to take a nap.   -Educated pt on taking time to adjust to position changes prior to progressing activity.   -Instructed patient to use call light to have nursing staff assist with needs/transfers  -Pt edu on role of OT, POC, safety when performing self care tasks , benefit of performing OOB activity, and safety when performing functional transfers and mobility.  - White board updated  - Self care tasks completed-- as noted above      Patient left supine with call button in reach and Nsg notified    GOALS:    Multidisciplinary Problems       Occupational Therapy Goals          Problem: Occupational Therapy    Goal Priority Disciplines Outcome Interventions   Occupational Therapy Goal     OT, PT/OT Ongoing, Progressing    Description: Goals to be met by: 1/30/23     Patient will increase functional independence with ADLs by performing:    LE Dressing with Modified [place].  Grooming while standing at sink with Supervision.  Toileting from toilet with Supervision for hygiene and clothing management.   Bathing from  shower chair/bench with Supervision.  Step transfer with Modified [place]  Upper extremity exercise program, with [place].   Caregiver will be educated on level of assistance required to safely perform self care and functional transfers.                         History:     Past Medical History:   Diagnosis Date    Arthritis     Atrial myxoma     CHF (congestive heart failure)     Coronary atherosclerosis     Diabetes mellitus, type 2     Difficult intubation     Hepatitis B     Hyperlipidemia     Hypertension     Non-alcoholic fatty liver disease     Rheumatoid arthritis     Rheumatoid arthritis flare 07/12/2021    Stroke     TIA    Systolic heart failure          Past Surgical History:   Procedure Laterality Date    CORONARY ANGIOGRAPHY N/A 3/10/2021    Procedure: ANGIOGRAM, CORONARY ARTERY - right radial;  Surgeon: Shemar Dempsey MD;  Location: [facility] CATH LAB;  Service: Cardiology;  Laterality: N/A;    CORONARY STENT PLACEMENT  03/10/2021    prox-mid RCA [place] 4.5 x 26 mm, 4.5 x 12 mm    INCISION AND DRAINAGE OF SCROTUM N/A 11/15/2022    Procedure: INCISION AND DRAINAGE, SCROTUM;  Surgeon: William Hatch MD;  Location: [facility] OR;  Service: Urology;  Laterality: N/A;    INCISION AND DRAINAGE OF SCROTUM N/A 11/17/2022    Procedure: INCISION AND DRAINAGE, SCROTUM;  Surgeon: William Hatch MD;  Location: [facility] OR;  Service: Urology;  Laterality: N/A;    LUNG LOBECTOMY Right 2008    RUL  lobectomy after removal of atrial myxoma    PLEURA BIOPSY      RESECTION OF ATRIAL MYXOMA  2007       Time Tracking:     OT Date of Treatment: 01/02/23  OT Start Time: 0822  OT Stop Time: 0909  OT Total Time (min): 47 min    Billable Minutes:Evaluation 9  Self Care/Home Management 23  Therapeutic Activity 15    1/2/2023

## 2023-01-02 NOTE — NURSING
PRN aspart not given d/t patient not wanting med without eating dinner, which he has not eaten tonight.

## 2023-01-02 NOTE — NURSING
Patient refused wound care during this shift. Wound care was delayed due to waiting on [facility] to be delivered. Patient stated that he would like to wait until tonight for wound care. Educated patient on orders for BID wound care. Patient verbalized understanding. Charge nurse notified.

## 2023-01-03 PROBLEM — S31.30XA: Status: ACTIVE | Noted: 2023-01-03

## 2023-01-03 LAB
ANION GAP SERPL CALC-SCNC: 11 MMOL/L (ref 8–16)
BASOPHILS # BLD AUTO: 0.07 K/UL (ref 0–0.2)
BASOPHILS NFR BLD: 1 % (ref 0–1.9)
BUN SERPL-MCNC: 14 MG/DL (ref 8–23)
CALCIUM SERPL-MCNC: 9.6 MG/DL (ref 8.7–10.5)
CHLORIDE SERPL-SCNC: 96 MMOL/L (ref 95–110)
CO2 SERPL-SCNC: 27 MMOL/L (ref 23–29)
CREAT SERPL-MCNC: 1 MG/DL (ref 0.5–1.4)
DIFFERENTIAL METHOD: ABNORMAL
EOSINOPHIL # BLD AUTO: 0.2 K/UL (ref 0–0.5)
EOSINOPHIL NFR BLD: 2.8 % (ref 0–8)
ERYTHROCYTE [DISTWIDTH] IN BLOOD BY AUTOMATED COUNT: 12.3 % (ref 11.5–14.5)
EST. GFR  (NO RACE VARIABLE): >60 ML/MIN/1.73 M^2
GLUCOSE SERPL-MCNC: 175 MG/DL (ref 70–110)
HCT VFR BLD AUTO: 42.4 % (ref 40–54)
HGB BLD-MCNC: 13.9 G/DL (ref 14–18)
IMM GRANULOCYTES # BLD AUTO: 0.05 K/UL (ref 0–0.04)
IMM GRANULOCYTES NFR BLD AUTO: 0.7 % (ref 0–0.5)
LYMPHOCYTES # BLD AUTO: 2.4 K/UL (ref 1–4.8)
LYMPHOCYTES NFR BLD: 34.4 % (ref 18–48)
MAGNESIUM SERPL-MCNC: 2.1 MG/DL (ref 1.6–2.6)
MCH RBC QN AUTO: 32.4 PG (ref 27–31)
MCHC RBC AUTO-ENTMCNC: 32.8 G/DL (ref 32–36)
MCV RBC AUTO: 99 FL (ref 82–98)
MONOCYTES # BLD AUTO: 0.8 K/UL (ref 0.3–1)
MONOCYTES NFR BLD: 11.8 % (ref 4–15)
NEUTROPHILS # BLD AUTO: 3.5 K/UL (ref 1.8–7.7)
NEUTROPHILS NFR BLD: 49.3 % (ref 38–73)
NRBC BLD-RTO: 0 /100 WBC
PHOSPHATE SERPL-MCNC: 4.4 MG/DL (ref 2.7–4.5)
PLATELET # BLD AUTO: 269 K/UL (ref 150–450)
PMV BLD AUTO: 12.2 FL (ref 9.2–12.9)
POCT GLUCOSE: 148 MG/DL (ref 70–110)
POCT GLUCOSE: 185 MG/DL (ref 70–110)
POCT GLUCOSE: 230 MG/DL (ref 70–110)
POTASSIUM SERPL-SCNC: 4.3 MMOL/L (ref 3.5–5.1)
RBC # BLD AUTO: 4.29 M/UL (ref 4.6–6.2)
SARS-COV-2 RNA RESP QL NAA+PROBE: NOT DETECTED
SODIUM SERPL-SCNC: 134 MMOL/L (ref 136–145)
WBC # BLD AUTO: 7.04 K/UL (ref 3.9–12.7)

## 2023-01-03 PROCEDURE — 63600175 PHARM REV CODE 636 W HCPCS: Performed by: NURSE PRACTITIONER

## 2023-01-03 PROCEDURE — 85025 COMPLETE CBC W/AUTO DIFF WBC: CPT | Performed by: NURSE PRACTITIONER

## 2023-01-03 PROCEDURE — 83735 ASSAY OF MAGNESIUM: CPT | Performed by: NURSE PRACTITIONER

## 2023-01-03 PROCEDURE — 97535 SELF CARE MNGMENT TRAINING: CPT | Mod: CO

## 2023-01-03 PROCEDURE — 84100 ASSAY OF PHOSPHORUS: CPT | Performed by: NURSE PRACTITIONER

## 2023-01-03 PROCEDURE — 97530 THERAPEUTIC ACTIVITIES: CPT | Mod: CQ

## 2023-01-03 PROCEDURE — 80048 BASIC METABOLIC PNL TOTAL CA: CPT | Performed by: NURSE PRACTITIONER

## 2023-01-03 PROCEDURE — 11000004 HC SNF PRIVATE

## 2023-01-03 PROCEDURE — 97116 GAIT TRAINING THERAPY: CPT | Mod: CQ

## 2023-01-03 PROCEDURE — 97110 THERAPEUTIC EXERCISES: CPT | Mod: CO

## 2023-01-03 PROCEDURE — 36415 COLL VENOUS BLD VENIPUNCTURE: CPT | Performed by: NURSE PRACTITIONER

## 2023-01-03 PROCEDURE — 25000003 PHARM REV CODE 250: Performed by: NURSE PRACTITIONER

## 2023-01-03 PROCEDURE — U0005 INFEC AGEN DETEC AMPLI PROBE: HCPCS | Performed by: HOSPITALIST

## 2023-01-03 RX ADMIN — DIGOXIN 125 MCG: 125 TABLET ORAL at 09:01

## 2023-01-03 RX ADMIN — GENTAMICIN SULFATE 2 DROP: 3 SOLUTION OPHTHALMIC at 09:01

## 2023-01-03 RX ADMIN — ACETAMINOPHEN 650 MG: 325 TABLET ORAL at 05:01

## 2023-01-03 RX ADMIN — OXYCODONE HYDROCHLORIDE 15 MG: 5 TABLET ORAL at 06:01

## 2023-01-03 RX ADMIN — OXYCODONE HYDROCHLORIDE 10 MG: 10 TABLET, FILM COATED, EXTENDED RELEASE ORAL at 09:01

## 2023-01-03 RX ADMIN — GENTAMICIN SULFATE 2 DROP: 3 SOLUTION OPHTHALMIC at 02:01

## 2023-01-03 RX ADMIN — METOPROLOL TARTRATE 50 MG: 50 TABLET, FILM COATED ORAL at 09:01

## 2023-01-03 RX ADMIN — FAMOTIDINE 20 MG: 20 TABLET ORAL at 09:01

## 2023-01-03 RX ADMIN — INSULIN DETEMIR 5 UNITS: 100 INJECTION, SOLUTION SUBCUTANEOUS at 09:01

## 2023-01-03 RX ADMIN — THERA TABS 1 TABLET: TAB at 09:01

## 2023-01-03 RX ADMIN — HEPARIN SODIUM 5000 UNITS: 5000 INJECTION INTRAVENOUS; SUBCUTANEOUS at 09:01

## 2023-01-03 RX ADMIN — OXYCODONE HYDROCHLORIDE 15 MG: 5 TABLET ORAL at 02:01

## 2023-01-03 RX ADMIN — HEPARIN SODIUM 5000 UNITS: 5000 INJECTION INTRAVENOUS; SUBCUTANEOUS at 05:01

## 2023-01-03 RX ADMIN — CIPROFLOXACIN AND DEXAMETHASONE 4 DROP: 3; 1 SUSPENSION/ DROPS AURICULAR (OTIC) at 09:01

## 2023-01-03 RX ADMIN — INSULIN ASPART 14 UNITS: 100 INJECTION, SOLUTION INTRAVENOUS; SUBCUTANEOUS at 12:01

## 2023-01-03 RX ADMIN — ASPIRIN 81 MG 81 MG: 81 TABLET ORAL at 09:01

## 2023-01-03 RX ADMIN — INSULIN ASPART 14 UNITS: 100 INJECTION, SOLUTION INTRAVENOUS; SUBCUTANEOUS at 08:01

## 2023-01-03 RX ADMIN — POTASSIUM CHLORIDE 40 MEQ: 1500 TABLET, EXTENDED RELEASE ORAL at 09:01

## 2023-01-03 RX ADMIN — DILTIAZEM HYDROCHLORIDE 120 MG: 120 CAPSULE, COATED, EXTENDED RELEASE ORAL at 09:01

## 2023-01-03 RX ADMIN — INSULIN ASPART 4 UNITS: 100 INJECTION, SOLUTION INTRAVENOUS; SUBCUTANEOUS at 12:01

## 2023-01-03 RX ADMIN — ESCITALOPRAM OXALATE 20 MG: 10 TABLET ORAL at 09:01

## 2023-01-03 RX ADMIN — FOLIC ACID 1 MG: 1 TABLET ORAL at 09:01

## 2023-01-03 RX ADMIN — SENNOSIDES AND DOCUSATE SODIUM 1 TABLET: 50; 8.6 TABLET ORAL at 09:01

## 2023-01-03 RX ADMIN — INSULIN ASPART 14 UNITS: 100 INJECTION, SOLUTION INTRAVENOUS; SUBCUTANEOUS at 05:01

## 2023-01-03 NOTE — PT/OT/SLP PROGRESS
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  He presents with limitations in performance of self-care, functional mobility, and ADLs. Performance deficits affecting function are weakness, impaired endurance, impaired self care skills, impaired functional mobility, decreased safety awareness, pain, gait instability, impaired balance, impaired cardiopulmonary response to activity. Pt tolerated Tx without incident and is making progress but continues to require assist to perform self care tasks, functional mobility and functional transfers. Pt would continue to benefit from OT intervention to further functional (I)ce and safety.     Rehab Potential is good    Activity tolerance:  Good    Plan:     Patient to be seen 5 x/week to address the above listed problems via self-care/home management, therapeutic activities, therapeutic exercises    Plan of Care Expires: 01/31/23  Plan of Care Reviewed with: patient    Subjective     Communicated with: Nurse prior to session.    Pain/Comfort:  Pain Rating 1: 7/10  Location - Orientation 1: generalized  Location 1: scrotum  Pain Addressed 1: Pre-medicate for activity, Distraction  Pain Rating Post-Intervention 1:  (not rated)    Patient's cultural, spiritual, Gnosticist conflicts given the current situation:  yes    Objective:     Patient found HOB elevated with  (no active lines) upon OT entry to room.    Bed Mobility:    Patient  completed Scooting/Bridging with supervision  Patient completed Supine to Sit with supervision     Functional Mobility/Transfers:  Patient completed Sit <> Stand Transfer with supervision  with  hand-held assist   Patient completed Bed <> Chair Transfer using Stand Pivot technique with contact guard assistance with hand-held assist  Functional Mobility: Pt propelled W/C from room to therapy gym with sup for approximately 150 ft using BUE to propel chair.    Activities of Daily Living:  Grooming: supervision seated sinkside with set upto complete oral and facial hygiene and shaving  Upper Body Dressing: supervision seated with set up  Lower Body Dressing: stand by assistance      [facility] 6 Click ADL: 21    OT Exercises: Pt performed UBE exercise for 12 minutes with Min resistance with brief breaks secondary to fatigue. UE exercises performed to increase functional endurance and strength in order increase independence when performing self care tasks, functional ambulation, W/C propulsion, and functional standing activities.    Treatment & Education:  Pt educated on role of OT, safety while performing functional transfers, safety while performing self care tasks, and progress towards OT goals    Patient left up in chair in therapy gym with  PTA present    GOALS:   Multidisciplinary Problems       Occupational Therapy Goals          Problem: Occupational Therapy    Goal Priority Disciplines Outcome Interventions   Occupational Therapy Goal     OT, PT/OT Ongoing, Progressing    Description: Goals to be met by: 1/30/23     Patient will increase functional independence with ADLs by performing:    LE Dressing with Modified [place].  Grooming while standing at sink with Supervision.  Toileting from toilet with Supervision for hygiene and clothing management.   Bathing from  shower chair/bench with Supervision.  Step transfer with Modified [place]  Upper extremity exercise program, with [place].   Caregiver will  be educated on level of assistance required to safely perform self care and functional transfers.                         Time Tracking:     OT Date of Treatment: 01/03/23  OT Start Time: 1028    OT Stop Time: 1109  OT Total Time (min): 41 min    Billable Minutes:Self Care/Home Management 23  Therapeutic Exercise 18    1/3/2023  A client care conference was performed between the LOTR and CAMPBELL, prior to treatment by CAMPBELL, to discuss the patient's status, treatment plan and established goals.

## 2023-01-03 NOTE — PROGRESS NOTES
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 male with PMH of DM2, CAD, HTN, and HLD who presents to [facility] for treatment of sepsis and debility. Patient presented to urgent care with complaints of scrotal pain, redness and fever. He was advised to present to ED. Patient presented to the ED the next day. In ED he reported, fevers and chills at home, nausea, decreased PO intake, and multiple syncopal episodes with +incontinence of stool. 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  Empiric initiated with cefepime, clindamycin, metronidazole, and vancomycin.  Urology consulted and he underwent emergent excision and debridement of necrotic tissue on 11/15. Repeat washout performed 11/17. Improved and stepped down from ICU 11/18.  Bedside debridement performed on 11/22.  ID consulted and antibiotics regimen changed to cefepime and Flagyl known.  Wound cultures with Anaerococcus and Prevotella species.  Renal function normalized.  Return of SVT and Cardiology consulted with start of metoprolol. Patient deemed medically stable. Transferred to [facility]"" on 12/1/22. He completed course of cefepime and flagyl for treatment of kami's gangrene. Discharged to [facility] on 12/31/22.  Today, he has multiple concerns. He would like to make sure he has follow ups scheduled with cardiology, infectious disease, and rheumatology. Also reporting occasional dizziness. He refused his lasix this morning. Reports poor po intake and loss of 50 pounds over the  past few months. Will hold lasix for now. He also is requesting to stop his prednisone, will discontinue. Reports having some sinus congestion and pressure in his ears. Currently on course of zyrtec.     Patient will be treated at [facility] with PT and OT to improve functional status and ability to perform ADLs.     Interval History  All of today's labs reviewed and are listed below.  24 hr vital sign ranges listed below.  Patient denies shortness of breath, abdominal discomfort, nausea, or vomiting.  Patient reports an adequate appetite.  Patient denies dysuria.  Patient reports having regular bowel movements.  Patient progessing with PT/OT. Continuing to follow and treat all acute and chronic conditions.    Past Medical History: Patient has a past medical history of Arthritis, Atrial myxoma, CHF (congestive heart failure), Coronary atherosclerosis, Diabetes mellitus, type 2, Difficult intubation, Hepatitis B, Hyperlipidemia, Hypertension, Non-alcoholic fatty liver disease, Rheumatoid arthritis, Rheumatoid arthritis flare (07/12/2021), Stroke, and Systolic heart failure.    Past Surgical History: Patient has a past surgical history that includes Pleura biopsy; Resection of atrial myxoma (2007); Lung lobectomy (Right, 2008); Coronary angiography (N/A, 3/10/2021); Coronary stent placement (03/10/2021); Incision and drainage of scrotum (N/A, 11/15/2022); and Incision and drainage of scrotum (N/A, 11/17/2022).    Social History: Patient reports that he has been smoking cigarettes. He has a 35.00 pack-year smoking history. He has never used smokeless tobacco. He reports that he does not currently use alcohol. He reports that he does not use drugs.    Family History:  family history includes Cancer in his sister; Diabetes type I in his father; Diabetes type II in his mother; Hodgkin's lymphoma in his mother; Kidney failure in his father.    Allergies: Patient is allergic to enbrel [etanercept], nsaids (non-steroidal  anti-inflammatory drug), statins-hmg-coa reductase inhibitors, and pcn [penicillins].        Review of Systems   Constitutional:  Positive for weight loss. Negative for fever and malaise/fatigue.   HENT:  Positive for congestion. Negative for ear discharge, ear pain and hearing loss.    Respiratory:  Negative for cough and shortness of breath.    Cardiovascular:  Negative for chest pain and leg swelling.   Gastrointestinal:  Negative for nausea and vomiting.   Genitourinary:  Negative for dysuria and frequency.   Musculoskeletal:  Positive for joint pain (fingers). Negative for myalgias.   Skin:  Negative for itching and rash.   Neurological:  Positive for dizziness and weakness. Negative for headaches.   Psychiatric/Behavioral:  Negative for depression. The patient is not nervous/anxious.        24 hour Vital Sign Range   Temp:  [97.4 °F (36.3 °C)-98.1 °F (36.7 °C)]   Pulse:  [88-96]   Resp:  [18]   BP: (135-149)/(78)   SpO2:  [95 %-96 %]       Physical Exam  Constitutional:       Appearance: Normal appearance.   HENT:      Head: Normocephalic and atraumatic.      Mouth/Throat:      Mouth: Mucous membranes are moist.      Pharynx: Oropharynx is clear.   Eyes:      Extraocular Movements: Extraocular movements intact.      Conjunctiva/sclera:      Right eye: Exudate present.      Pupils: Pupils are equal, round, and reactive to light.   Cardiovascular:      Rate and Rhythm: Normal rate and regular rhythm.      Heart sounds: No murmur heard.  Pulmonary:      Effort: Pulmonary effort is normal.   Abdominal:      General: Abdomen is flat.      Palpations: Abdomen is soft.   Musculoskeletal:      Right lower leg: No edema.      Left lower leg: No edema.   Skin:     Capillary Refill: Capillary refill takes less than 2 seconds.      Comments: Scrotal wound   Neurological:      General: No focal deficit present.      Mental Status: He is alert.   Psychiatric:         Mood and Affect: Mood normal.         Behavior: Behavior  normal.           Incision/Site 11/17/22 1639 Left Scrotum lateral vertical (Active)   11/17/22 1639   Present Prior to Hospital Arrival?:    Side: Left   Location: Scrotum   Orientation: lateral   Incision Type: vertical   Closure Method:    Additional Comments:    Removal Indication and Assessment:    Wound Outcome:    Removal Indications:    Wound Image    01/03/23 0825   Incision WDL ex 01/03/23 0825   Dressing Appearance Open to air 01/03/23 0825   Drainage Amount None 01/03/23 0825   Drainage Characteristics/Odor No odor 01/03/23 0825   Appearance Pink;Moist;Epithelialization;Granulating 01/03/23 0825   Black (%), Wound Tissue Color 0 % 12/28/22 1010   Red (%), Wound Tissue Color 100 % 01/03/23 0825   Yellow (%), Wound Tissue Color 0 % 12/28/22 1010   Periwound Area Intact;Dry;Pink 01/03/23 0825   Wound Edges Open 01/03/23 0825   Wound Length (cm) 6 cm 01/03/23 0825   Wound Width (cm) 3.7 cm 01/03/23 0825   Wound Depth (cm) 0.2 cm 01/03/23 0825   Wound Volume (cm^3) 4.44 cm^3 01/03/23 0825   Wound Surface Area (cm^2) 22.2 cm^2 01/03/23 0825   Tunneling (depth (cm)/location) 0 01/03/23 0825   Undermining (depth (cm)/location) 0 01/03/23 0825   Care Cleansed with:;Antimicrobial agent 01/03/23 0825   Dressing Applied;Gauze, wet to moist;Island/border 01/03/23 0825   Packing other (see comment) 12/27/22 0030   Packing Inserted  0 01/03/23 0825   Packing Removed 0 01/03/23 0825   Periwound Care Absorptive dressing applied;Skin barrier film applied 01/03/23 0825   Compression Other (see comments) 12/27/22 0030   Off Loading Off loading shoe 12/12/22 2209   Dressing Change Due 01/03/23 01/03/23 0825   Number of days: 47       Full skin assessment completed by this NP 1/3/23      Labs:  Recent Labs   Lab 12/29/22  0513 01/03/23  0440   WBC 7.88 7.04   HGB 14.1 13.9*   HCT 42.7 42.4    269     Recent Labs   Lab 12/29/22  0513 01/03/23  0440   * 134*   K 4.2 4.3    96   CO2 25 27   BUN 12 14    CREATININE 0.9 1.0   * 175*   CALCIUM 8.9 9.6   MG 1.8 2.1   PHOS 4.4 4.4     No results for input(s): ALKPHOS, ALT, AST, ALBUMIN, PROT, BILITOT, INR in the last 168 hours.    Recent Labs     01/01/23  0058 01/01/23 2053 01/02/23  0719 01/02/23  1106 01/02/23  1611 01/02/23 2054 01/03/23  0724 01/03/23  1140   POCTGLUCOSE 236* 227* 222* 142* 146* 165* 185* 230*       Meds Scheduled:   aspirin  81 mg Oral Daily    cetirizine  10 mg Oral Daily    digoxin  125 mcg Oral Daily    diltiaZEM  120 mg Oral Q12H    EScitalopram oxalate  20 mg Oral QHS    ezetimibe  10 mg Oral Daily    famotidine  20 mg Oral BID    folic acid  1 mg Oral Daily    gentamicin  2 drop Left Eye TID    heparin (porcine)  5,000 Units Subcutaneous Q8H    insulin aspart U-100  14 Units Subcutaneous TIDWM    insulin detemir U-100  44 Units Subcutaneous Daily    insulin detemir U-100  5 Units Subcutaneous QHS    LIDOcaine  1 patch Transdermal Q24H    linaCLOtide  72 mcg Oral Before breakfast    metoprolol tartrate  50 mg Oral Q12H    multivitamin  1 tablet Oral Daily    oxyCODONE  10 mg Oral Q12H    potassium chloride  40 mEq Oral Daily    [START ON 1/7/2023] predniSONE  5 mg Oral Daily    senna-docusate 8.6-50 mg  1 tablet Oral BID       PRN:   acetaminophen, albuterol-ipratropium, benzonatate, bisacodyL, calcium carbonate, dextrose 10%, dextrose 10%, glucagon (human recombinant), glucose, glucose, hydrOXYzine, insulin aspart U-100, melatonin, naloxone, ondansetron, oxyCODONE, promethazine      Assessment and Plan:  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 Moisten gauze with VASHE and wring out excess liquid. Apply to wound bed tucking into all wound tunnels and undermining so that all aspects of the wound bed are covered. Add additional to lightly fill the wound bed (not tightly pack). Cover  with telfa island dressing cut in half- then  Abd pads/ mesh underwear. Change per BID or if dressing comes loose or saturated.  Wound care nurse following    Type 2 Diabetes mellitus with hyperglycemia, with long-term current use of insulin  Continue insulin aspart 14 units tid with meals  Insulin detemir 44 units daily  Insulin detemir 5 units nightly  Accuchecks ac and hs  SSI    Coronary artery disease  Continue asa daily    Chronic diastolic heart failure  Continue digoxin 125mcg daily  Continue diltiazem 120mg bid  Continue metoprolol 50 mg bid  Holding lasix due to poor po intake/ intermittent dizziness    Hypokalemia  On Kcl 40mEq daily  May need to d/c now that we are hold lasix, monitor closely    Conjunctivitis  Continue gentamicin drops tid    Otitis media with effusion    Hyperlipidemia  Continue zetia 10mg daily    Dysphoric mood  Continue lexapro 20mg daily      Anticipate disposition:    Home with home health      Follow-up needed during SNF admission:       Follow-up needed after discharge from SNF:   - PCP within 1-2 weeks  - See appt scheduled below     Future Appointments   Date Time Provider Department Center   1/18/2023  3:00 PM Kevin Lares MD [id number]   1/18/2023  3:30 PM Lalitha Stroud RN, CDE [id number]   2/2/2023  3:00 PM Shemar Dempsey MD [id number]         I certify that SNF services are required to be given on an inpatient basis because Nithin Wagner needs for skilled nursing care and/or skilled rehabilitation are required on a daily basis and such services can only practically be provided in a skilled nursing facility setting and are for an ongoing condition for which she received inpatient care in the hospital.       Extended Visit:   Total time spent: 132minutes  Non Face to Face Time: 99 minutes   Description of Time: counseling provided on clinical condition, therapies provided, plan of care, emotional support, coordinating patient  care with other care team members, reviewing and interpreting labs and imaging, collaboration with physician, initiating new orders, chart review, and documentation. See interval hx.         Polina Webber NP  Department of Hospital Medicine   [facility]     DOS: 1/3/2023       Patient note was created using MModal Dictation.  Any errors in syntax or even information may not have been identified and edited on initial review prior to signing this note.

## 2023-01-03 NOTE — PT/OT/SLP PROGRESS
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      Performance deficits affecting function: weakness, impaired functional mobility, decreased safety awareness, impaired cardiopulmonary response to activity, impaired endurance, gait instability, pain, impaired balance, impaired self care skills, decreased lower extremity function.    Rehab Potential is fair    Activity Tolerance: Fair    Plan:     Patient to be seen 6 x/week to address the above listed problems via gait training, therapeutic activities, therapeutic exercises, neuromuscular re-education    Plan of Care Expires: 02/01/23  Plan of Care Reviewed with: patient    Subjective      "I just can't do any exercise with this right knee. I don't think I could take it. It just hurts too bad."     Pain/Comfort:  Pain Rating 1: 8/10  Location - Orientation 1: generalized  Location 1: scrotum  Pain Addressed 1: Pre-medicate for activity, Reposition, Distraction  Pain Rating Post-Intervention 1: other (see comments) (unrated c/o pain at multiple joints; R shoulder, hip and knee)  Location - Orientation 2: generalized  Pain Addressed 2: Distraction, Other (see comments) (deferred therex d/t c/o pain at multiple R sided joints most notably R knee)  Pain Rating Post-Intervention 2: other (see comments) (unrated)    Patient's cultural, spiritual, Congregational conflicts given the current " situation:  no    Objective:     Communicated with RN prior to session.  Patient found  seated in w/c in therapy gym  with  (no lines attached) upon PTA entry to room.     Therapeutic Activities and Exercises:   Educated on importance of increased time spent upright , OOB, up in chair. Pt electing to return to EOB and then to lie down back to bed despite only short time out of bed this day.   BP (MAP) monitored with improvement in pressure with all mobility:  99/66 (78) - sitting  103/63 (77) - sitting, post walk 1  112/64 (83) - sitting, post walk 2    Functional Mobility:  Transfers:     Sit to Stand:  stand by assistance with no AD and rolling walker  Bed to Chair: contact guard assistance with  no AD  using  Squat Pivot  Gait: Pt ambulates 38 ft, 86 ft x 3 trials, and 94 ft with RW and CGA. 1 of 3 86 ft gait trials performed with no AD and Min A for lateral instability. Pt with narrow SHAHID, lateral instability and reliance on BUE support for balance. Seated rest breaks needed d/t combination of fatigue and pt report of dizziness.      AM-PAC 6 CLICK MOBILITY  18    Patient left sitting edge of bed with call button in reach.    GOALS:   Multidisciplinary Problems       Physical Therapy Goals          Problem: Physical Therapy    Goal Priority Disciplines Outcome Goal Variances Interventions   Physical Therapy Goal     PT, PT/OT Ongoing, Progressing     Description: Goals to be met by: 2023     Patient will increase functional independence with mobility by performin. Sit to stand transfer with Modified [place]  2. Bed to chair transfer with Modified [place] using No Assistive Device  3. Gait  x 150 feet with Modified [place] using rolling walker or cane.   4. Wheelchair propulsion x150 feet with Modified [place]   5. Stand for 10 minutes with [place]                         Time Tracking:     PT Received On: 23  PT Start Time: 1110  PT Stop Time: 1138  PT Total Time (min):  28 min    Billable Minutes: Gait Training 20 and Therapeutic Activity 8    Treatment Type: Treatment  PT/PTA: PTA     PTA Visit Number: 1 01/03/2023

## 2023-01-03 NOTE — PLAN OF CARE
Interdisciplinary team, Lucretia Light, RN Charge Nurse, Kiara Arndt, RN MDS Coordinator, Vicki Baker PT, Rehab Supervisor, Marquise Santhosh LMSW, and Danelle Kim, Dietician, spoke to patient for care plan conference, weekly status update, and therapy progress update. Tentative discharge date set for 1/24/23.

## 2023-01-03 NOTE — PROGRESS NOTES
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    History:     Past Medical History:   Diagnosis Date    Arthritis     Atrial myxoma     CHF (congestive heart failure)     Coronary atherosclerosis     Diabetes mellitus, type 2     Difficult intubation     Hepatitis B     Hyperlipidemia     Hypertension     Non-alcoholic fatty liver disease     Rheumatoid arthritis     Rheumatoid arthritis flare 07/12/2021    Stroke     TIA    Systolic heart failure        Social History     Socioeconomic History    Marital status: Single   Occupational History    Occupation: , respiratory therapist, founded [employer]     Comment: Retired   Tobacco Use    Smoking status: Some Days     Packs/day: 1.00     Years: 35.00     Pack years: 35.00     Types: Cigarettes    Smokeless tobacco: Never   Substance and Sexual Activity    Alcohol use: Not Currently    Drug use: No    Sexual activity: Never     Social Determinants of Health     Financial Resource Strain: High Risk    Difficulty of Paying Living Expenses: Hard   Food Insecurity: No Food Insecurity    Worried About Running Out of Food in the Last Year: Never true    Ran Out of Food in the Last Year: Never true   Transportation Needs: Unmet Transportation Needs    Lack of Transportation (Medical): Yes    Lack of Transportation (Non-Medical): Yes   Physical Activity: Inactive    Days of Exercise per Week: 0 days    Minutes of Exercise per Session: 0 min   Stress: Stress Concern Present    Feeling of Stress : To some extent   Social Connections: Socially Isolated    Frequency of Communication with Friends and Family: More than three times a week    Frequency of Social Gatherings with Friends and Family: More than three times a week    Attends Jew Services: Never    Active Member of Clubs or Organizations: No    Attends Club or Organization Meetings: Never    Marital  Status: Never    Housing Stability: High Risk    Unable to Pay for Housing in the Last Year: Yes    Number of Places Lived in the Last Year: 1    Unstable Housing in the Last Year: No       Precautions:     Allergies as of 12/30/2022 - Reviewed 12/01/2022   Allergen Reaction Noted    Enbrel [etanercept] Shortness Of Breath 07/12/2021    Nsaids (non-steroidal anti-inflammatory drug) Other (See Comments) 06/29/2015    Statins-hmg-coa reductase inhibitors Other (See Comments) 06/29/2015    Pcn [penicillins] Rash 06/29/2015       WOC Assessment Details/Treatment   Wound care consulted for scrotum  History of Mary's gangrene- left scrotum I & D has open wound edges with pink, moist wound bed, no opening observed to pack wound- 0.125% Dakin's solution on gauze applied over wound bed and telfa island applied to hold in place.   Wound care discussed/ verbalized understanding.  He is requesting medical follow-up at clinic for other medical issues- cardiology, rheumatology, infectious disease.      Plan:  Left scrotum- change to Vashe wound solution on gauze BID over wound bed, cover with telfa island dressing, ABD dressing, disposable briefs.   Consult urology for continued care.     Nursing to continue care, pressure prevention measures.  Wound care will follow- up     Recommendations made to primary team for above plan per written report/secure chat . Orders placed.        01/03/23 0825        Incision/Site 11/17/22 1639 Left Scrotum lateral vertical   Date First Assessed/Time First Assessed: 11/17/22 1639   Side: Left  Location: Scrotum  Orientation: lateral  Incision Type: vertical   Wound Image     Incision WDL ex   Dressing Appearance Open to air   Drainage Amount None   Drainage Characteristics/Odor No odor   Appearance Pink;Moist;Epithelialization;Granulating   Red (%), Wound Tissue Color 100 %   Periwound Area Intact;Dry;Pink   Wound Edges Open   Wound Length (cm) 6 cm   Wound Width (cm) 3.7 cm   Wound  Depth (cm) 0.2 cm   Wound Volume (cm^3) 4.44 cm^3   Wound Surface Area (cm^2) 22.2 cm^2   Tunneling (depth (cm)/location) 0   Undermining (depth (cm)/location) 0   Care Cleansed with:;Antimicrobial agent   Dressing Applied;Gauze, wet to moist;Island/border   Packing Inserted  0   Packing Removed 0   Periwound Care Absorptive dressing applied;Skin barrier film applied   Dressing Change Due 01/03/23 01/03/2023

## 2023-01-03 NOTE — PLAN OF CARE
Problem: Occupational Therapy  Goal: Occupational Therapy Goal  Description: Goals to be met by: 1/30/23     Patient will increase functional independence with ADLs by performing:    LE Dressing with Modified [place].  Grooming while standing at sink with Supervision.  Toileting from toilet with Supervision for hygiene and clothing management.   Bathing from  shower chair/bench with Supervision.  Step transfer with Modified [place]  Upper extremity exercise program, with [place].   Caregiver will be educated on level of assistance required to safely perform self care and functional transfers.    Outcome: Ongoing, Progressing

## 2023-01-03 NOTE — CONSULTS
[facility] - Skilled Nursing  Adult Nutrition  Consult Note    SUMMARY   Recommendations  Continue regular diet,  recommend probiotic daily, run Vit D test, consider zinc for 14 days, and ascorbic acid  x 30 days, add boost plus TID,RD following  Goals: PO to meet 85% of EEN/EPN with ONS by next RD follow up  Nutrition Goal Status: new    Assessment and Plan  Nutrition Problem:  Moderate Protein-Calorie Malnutrition  Malnutrition in the context of Acute Illness/Injury    Related to (etiology):  Inability to meet physiological needs PO in the presence of sepsis    Signs and Symptoms (as evidenced by):  Energy Intake: <75% of estimated energy requirement for > one month    Muscle Mass Depletion: mild depletion of clavicle region and interosseous muscle , moderate loss to hands and thighs  Weight Loss: 18%x < one month     Interventions(treatment strategy):  General diet  Collaboration with other providers  Commercial beverage(calories,protein) boost plus TID chocolate  Vitamin/mineral supplement therapy- MVI  Vitamin supplement therapy- folic acid, recommend ascorbic acid x 30 days   Mineral  supplement therapy recommend zinc x 14 days  Complimentary medicine- recommend probiotic    Nutrition Diagnosis Status:  New     Malnutrition Assessment 1/3/23     Skin (Micronutrient): dry, scaly (pt states this skin condition is related to RA)  Nails (Micronutrient): pallor (pt states this is normal for him)  Hair/Scalp (Micronutrient): dry, plucked easily  Eyes (Micronutrient): eyelid(s) pallor (red, infection present and already indentified)  Teeth (Micronutrient): broken dentition (missing back molars)  Neck/Chest (Micronutrient): muscle wasting  Musculoskeletal/Lower Extremities: muscle wasting       Weight Loss (Malnutrition): greater than 5% in 1 month (18% in 4 weeks 12/9-12/31/22)   Orbital Region (Subcutaneous Fat Loss): well nourished  Upper Arm Region (Subcutaneous Fat Loss): well nourished  Thoracic and Lumbar  "Region: well nourished   [place] Region (Muscle Loss): moderate depletion  Clavicle and Acromion Bone Region (Muscle Loss): mild depletion  Dorsal Hand (Muscle Loss): mild depletion  Patellar Region (Muscle Loss): mild depletion  Anterior Thigh Region (Muscle Loss): moderate depletion   Edema (Fluid Accumulation): 0-->no edema present             Reason for Assessment  Reason For Assessment: consult  Diagnosis: infection/sepsis  Relevant Medical History: DM, CHF, HLD, HTN, RA, CVA, NAFLD, CAD, stent placement obesity,smoker  Interdisciplinary Rounds: attended  General Information Comments: patient concerned about his weight loss this past month, continues to have poor appetite, scrotal wound and weakness, He asked that his diet be liberalized to regular to improve PO intake. He currently complains of constipation Lives alone  Nutrition Discharge Planning: Dc on heart healthy diabetic diet    Nutrition Risk Screen    Nutrition Risk Screen: large or nonhealing wound, burn or pressure injury, unintentional loss of 10 lbs or more in the past 2 months, reduced oral intake over the last month    Nutrition/Diet History    Patient Reported Diet/Restrictions/Preferences: diabetic diet  Spiritual, Cultural Beliefs, Mormon Practices, Values that Affect Care: no  Food Allergies: NKFA  Factors Affecting Nutritional Intake: decreased appetite, constipation, nausea/vomiting  Eats five times per day, 2 breakfast small meals, lunch and dinner and one snack pm  No ETOH use  Patient was provided with a menu, reviewed protein sources and always available menu.    Anthropometrics    Temp: 98.1 °F (36.7 °C)  Height Method: Stated  Height: 5' 11" (180.3 cm)  Height (inches): 71 in  Weight Method: Standard Scale  Weight: 92.8 kg (204 lb 9.4 oz)  Weight (lb): 204.59 lb  Ideal Body Weight (IBW), Male: 172 lb  % Ideal Body Weight, Male (lb): 118.95 %  BMI (Calculated): 28.5  BMI Grade: 25 - 29.9 - overweight  Weight Loss: " unintentional  Usual Body Weight (UBW), k.4 kg  Weight Change Amount:  (weight loss over 4 weeks)  % Usual Body Weight: 82.01  % Weight Change From Usual Weight: -18.17 %       Lab/Procedures/Meds    Pertinent Labs Reviewed: reviewed  Pertinent Labs Comments: Hg 13.9, HgA1c 7.6, glucose 175, Na 1234,  Pertinent Medications Reviewed: reviewed  Pertinent Medications Comments: ASA, Abx, folic acid, lasix, prednisone, KCl, MVI, famotidine, insulin,    Estimated/Assessed Needs  Weight Used For Calorie Calculations: 92.8 kg (204 lb 9.4 oz)  Energy Calorie Requirements (kcal): 2230  Energy Need Method: [place]-St Jeor (x 1.3 (PAL))  Protein Requirements: 101-117g  Weight Used For Protein Calculations: 78 kg (171 lb 15.3 oz) (1.3-1.5g/kg IBW 2/2 obesity)  Fluid Requirements (mL): 2230 or per MD  Estimated Fluid Requirement Method: RDA Method  RDA Method (mL): 2230  CHO Requirement: 279g    Nutrition Prescription Ordered  Current Diet Order: Regular  Nutrition Order Comments: PO 50%  Oral Nutrition Supplement: -    Evaluation of Received Nutrient/Fluid Intake  I/O: no data  Energy Calories Required: not meeting needs  Protein Required: not meeting needs  Fluid Required: meeting needs  Comments: LBM 23  Tolerance: tolerating  % Intake of Estimated Energy Needs: 25 - 50 %  % Meal Intake: 25 - 50 %    Nutrition Risk  Level of Risk/Frequency of Follow-up: high   Two times per week    Monitor and Evaluation    Food and Nutrient Intake: food and beverage intake  Food and Nutrient Adminstration: diet order  Knowledge/Beliefs/Attitudes: food and nutrition knowledge/skill  Physical Activity and Function: nutrition-related ADLs and IADLs  Anthropometric Measurements: weight change  Biochemical Data, Medical Tests and Procedures: electrolyte and renal panel, gastrointestinal profile, glucose/endocrine profile, inflammatory profile  Nutrition-Focused Physical Findings: overall appearance       Nutrition Follow-Up    RD  Follow-up?: Yes

## 2023-01-04 LAB
POCT GLUCOSE: 158 MG/DL (ref 70–110)
POCT GLUCOSE: 169 MG/DL (ref 70–110)
POCT GLUCOSE: 231 MG/DL (ref 70–110)
POCT GLUCOSE: 241 MG/DL (ref 70–110)

## 2023-01-04 PROCEDURE — 99306 PR NURSING FACILITY CARE, INIT, HIGH SEVERITY: ICD-10-PCS | Mod: AI,,, | Performed by: HOSPITALIST

## 2023-01-04 PROCEDURE — 11000004 HC SNF PRIVATE

## 2023-01-04 PROCEDURE — 99306 1ST NF CARE HIGH MDM 50: CPT | Mod: AI,,, | Performed by: HOSPITALIST

## 2023-01-04 PROCEDURE — 25000003 PHARM REV CODE 250: Performed by: NURSE PRACTITIONER

## 2023-01-04 PROCEDURE — 63600175 PHARM REV CODE 636 W HCPCS: Performed by: NURSE PRACTITIONER

## 2023-01-04 RX ADMIN — OXYCODONE HYDROCHLORIDE 15 MG: 5 TABLET ORAL at 05:01

## 2023-01-04 RX ADMIN — SENNOSIDES AND DOCUSATE SODIUM 1 TABLET: 50; 8.6 TABLET ORAL at 10:01

## 2023-01-04 RX ADMIN — ESCITALOPRAM OXALATE 20 MG: 10 TABLET ORAL at 09:01

## 2023-01-04 RX ADMIN — DIGOXIN 125 MCG: 125 TABLET ORAL at 10:01

## 2023-01-04 RX ADMIN — INSULIN ASPART 14 UNITS: 100 INJECTION, SOLUTION INTRAVENOUS; SUBCUTANEOUS at 08:01

## 2023-01-04 RX ADMIN — OXYCODONE HYDROCHLORIDE 15 MG: 5 TABLET ORAL at 01:01

## 2023-01-04 RX ADMIN — CETIRIZINE HYDROCHLORIDE 10 MG: 5 TABLET, FILM COATED ORAL at 10:01

## 2023-01-04 RX ADMIN — METOPROLOL TARTRATE 50 MG: 50 TABLET, FILM COATED ORAL at 09:01

## 2023-01-04 RX ADMIN — EZETIMIBE 10 MG: 10 TABLET ORAL at 10:01

## 2023-01-04 RX ADMIN — INSULIN ASPART 14 UNITS: 100 INJECTION, SOLUTION INTRAVENOUS; SUBCUTANEOUS at 12:01

## 2023-01-04 RX ADMIN — HEPARIN SODIUM 5000 UNITS: 5000 INJECTION INTRAVENOUS; SUBCUTANEOUS at 09:01

## 2023-01-04 RX ADMIN — HEPARIN SODIUM 5000 UNITS: 5000 INJECTION INTRAVENOUS; SUBCUTANEOUS at 05:01

## 2023-01-04 RX ADMIN — FAMOTIDINE 20 MG: 20 TABLET ORAL at 09:01

## 2023-01-04 RX ADMIN — ASPIRIN 81 MG 81 MG: 81 TABLET ORAL at 10:01

## 2023-01-04 RX ADMIN — DILTIAZEM HYDROCHLORIDE 120 MG: 120 CAPSULE, COATED, EXTENDED RELEASE ORAL at 09:01

## 2023-01-04 RX ADMIN — INSULIN ASPART 4 UNITS: 100 INJECTION, SOLUTION INTRAVENOUS; SUBCUTANEOUS at 05:01

## 2023-01-04 RX ADMIN — ONDANSETRON 4 MG: 4 TABLET, ORALLY DISINTEGRATING ORAL at 10:01

## 2023-01-04 RX ADMIN — INSULIN DETEMIR 44 UNITS: 100 INJECTION, SOLUTION SUBCUTANEOUS at 10:01

## 2023-01-04 RX ADMIN — OXYCODONE HYDROCHLORIDE 10 MG: 10 TABLET, FILM COATED, EXTENDED RELEASE ORAL at 09:01

## 2023-01-04 RX ADMIN — FAMOTIDINE 20 MG: 20 TABLET ORAL at 10:01

## 2023-01-04 RX ADMIN — HEPARIN SODIUM 5000 UNITS: 5000 INJECTION INTRAVENOUS; SUBCUTANEOUS at 01:01

## 2023-01-04 RX ADMIN — METOPROLOL TARTRATE 50 MG: 50 TABLET, FILM COATED ORAL at 10:01

## 2023-01-04 RX ADMIN — SENNOSIDES AND DOCUSATE SODIUM 1 TABLET: 50; 8.6 TABLET ORAL at 09:01

## 2023-01-04 RX ADMIN — OXYCODONE HYDROCHLORIDE 10 MG: 10 TABLET, FILM COATED, EXTENDED RELEASE ORAL at 10:01

## 2023-01-04 RX ADMIN — INSULIN DETEMIR 5 UNITS: 100 INJECTION, SOLUTION SUBCUTANEOUS at 09:01

## 2023-01-04 RX ADMIN — FOLIC ACID 1 MG: 1 TABLET ORAL at 10:01

## 2023-01-04 RX ADMIN — INSULIN ASPART 14 UNITS: 100 INJECTION, SOLUTION INTRAVENOUS; SUBCUTANEOUS at 05:01

## 2023-01-04 RX ADMIN — POTASSIUM CHLORIDE 40 MEQ: 1500 TABLET, EXTENDED RELEASE ORAL at 10:01

## 2023-01-04 RX ADMIN — LIDOCAINE 1 PATCH: 50 PATCH CUTANEOUS at 01:01

## 2023-01-04 RX ADMIN — INSULIN ASPART 2 UNITS: 100 INJECTION, SOLUTION INTRAVENOUS; SUBCUTANEOUS at 09:01

## 2023-01-04 RX ADMIN — DILTIAZEM HYDROCHLORIDE 120 MG: 120 CAPSULE, COATED, EXTENDED RELEASE ORAL at 10:01

## 2023-01-04 RX ADMIN — THERA TABS 1 TABLET: TAB at 10:01

## 2023-01-04 NOTE — H&P
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           HPI:   "Nithin Wagner is a 67 y.o. male with PMH of DM2, CAD, HTN, and HLD who presents to [facility] for treatment of sepsis and debility. Patient presented to urgent care with complaints of scrotal pain, redness and fever. He was advised to present to ED. Patient presented to the ED the next day. In ED he reported, fevers and chills at home, nausea, decreased PO intake, and multiple syncopal episodes with +incontinence of stool. 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  Empiric initiated with cefepime, clindamycin, metronidazole, and vancomycin.  Urology consulted and he underwent emergent excision and debridement of necrotic tissue on 11/15. Repeat washout performed 11/17. Improved and stepped down from ICU 11/18.  Bedside debridement performed on 11/22.  ID consulted and antibiotics regimen changed to cefepime and Flagyl known.  Wound cultures with Anaerococcus and Prevotella species.  Renal function normalized.  Return of SVT and Cardiology consulted with start of metoprolol. Patient deemed medically stable. Transferred to [facility] on 12/1/22. He completed course of cefepime and flagyl for treatment of kami's gangrene. Discharged to OSNF on 12/31/22.  Today, he has multiple concerns. He would like to make sure he has follow ups scheduled with cardiology, infectious disease, and rheumatology. Also reporting occasional dizziness. He " "refused his lasix this morning. Reports poor po intake and loss of 50 pounds over the past few months. Will hold lasix for now. He also is requesting to stop his prednisone, will discontinue. Reports having some sinus congestion and pressure in his ears. Currently on course of zyrtec." Per Polina Webber NP on 1/3/23.     He is doing okay today. Says that he feels some general malaise and chills. Denies any fevers. Having some increased arthralgias in his right shoulder, right knee, and hands. Feels that it is similar to his RA pain. He has not been on medication for his RA in some time. His scrotal wound is improving and the pain is not as severe. He is working more with therapy. He has occasional lightheadedness when getting up with therapy.     Patient admitted with skilled services with PT and OT to improve functional status and ability to perform ADLs.       Past Medical History:   Past Medical History:   Diagnosis Date    Arthritis     Atrial myxoma     CHF (congestive heart failure)     Coronary atherosclerosis     Diabetes mellitus, type 2     Difficult intubation     Hepatitis B     Hyperlipidemia     Hypertension     Non-alcoholic fatty liver disease     Rheumatoid arthritis     Rheumatoid arthritis flare 07/12/2021    Stroke     TIA    Systolic heart failure        Past Surgical History:   Past Surgical History:   Procedure Laterality Date    CORONARY ANGIOGRAPHY N/A 3/10/2021    Procedure: ANGIOGRAM, CORONARY ARTERY - right radial;  Surgeon: Shemar Dempsey MD;  Location: [facility] CATH LAB;  Service: Cardiology;  Laterality: N/A;    CORONARY STENT PLACEMENT  03/10/2021    prox-mid RCA [place] 4.5 x 26 mm, 4.5 x 12 mm    INCISION AND DRAINAGE OF SCROTUM N/A 11/15/2022    Procedure: INCISION AND DRAINAGE, SCROTUM;  Surgeon: William Hatch MD;  Location: [facility] OR;  Service: Urology;  Laterality: N/A;    INCISION AND DRAINAGE OF SCROTUM N/A 11/17/2022    Procedure: INCISION AND DRAINAGE, SCROTUM;  Surgeon: " William Hatch MD;  Location: [facility] OR;  Service: Urology;  Laterality: N/A;    LUNG LOBECTOMY Right 2008    RUL lobectomy after removal of atrial myxoma    PLEURA BIOPSY      RESECTION OF ATRIAL MYXOMA  2007       Social History:   Tobacco Use    Smoking status: Some Days     Packs/day: 1.00     Years: 35.00     Pack years: 35.00     Types: Cigarettes    Smokeless tobacco: Never   Substance and Sexual Activity    Alcohol use: Not Currently    Drug use: No    Sexual activity: Never       Family History:   Family History       Problem Relation (Age of Onset)    Cancer Sister    Diabetes type I Father    Diabetes type II Mother    Hodgkin's lymphoma Mother    Kidney failure Father            No current facility-administered medications on file prior to encounter.     Current Outpatient Medications on File Prior to Encounter   Medication Sig    aspirin 81 MG Chew Take 81 mg by mouth.    blood sugar diagnostic Strp To check BG 3 times daily, to use with insurance preferred meter    blood-glucose meter kit Use to check glucose as directed    ciclopirox (PENLAC) 8 % Soln Apply topically nightly.    clotrimazole (LOTRIMIN) 1 % cream Apply topically 2 (two) times daily.    digoxin (LANOXIN) 125 mcg tablet Take 1 tablet (125 mcg total) by mouth once daily.    dulaglutide (TRULICITY) 4.5 mg/0.5 mL pen injector Inject 4.5 mg into the skin every 7 days.    ergocalciferol (ERGOCALCIFEROL) 50,000 unit Cap Take 1 capsule (50,000 Units total) by mouth every 7 days.    ezetimibe (ZETIA) 10 mg tablet TAKE 1 TABLET BY MOUTH EVERY DAY    folic acid (FOLVITE) 1 MG tablet     furosemide (LASIX) 40 MG tablet Take 1 tablet (40 mg total) by mouth daily as needed (fluid).    hydroCHLOROthiazide (HYDRODIURIL) 25 MG tablet Take 1 tablet (25 mg total) by mouth once daily. (Patient taking differently: Take 25 mg by mouth as needed.)    hydrocodone-acetaminophen 10-325mg (NORCO)  mg Tab     imiquimod (ALDARA) 5 % cream APPLY 3 DAYS A  "WEEK TO WARTS UNTIL COMPLETE CLEARANCE OF WARTS    insulin admin supplies InPn InPen device, use to inject mealtime insulin 3 times daily. 1 pen to use, 1 for backup as needed.    insulin degludec (TRESIBA FLEXTOUCH U-200) 200 unit/mL (3 mL) insulin pen Inject 50 Units into the skin 2 (two) times a day.    lamiVUDine (EPIVIR) 150 MG Tab Take 1 tablet (150 mg total) by mouth once daily.    LINZESS 72 mcg Cap capsule     minocycline (MINOCIN,DYNACIN) 100 MG capsule Take 100 mg by mouth 2 (two) times daily.    multivitamin (THERAGRAN) per tablet Take 1 tablet by mouth once daily.    nitroGLYCERIN (NITROSTAT) 0.4 MG SL tablet Place 1 tablet (0.4 mg total) under the tongue every 5 (five) minutes as needed for Chest pain.    NOVOLOG PENFILL U-100 INSULIN 100 unit/mL Crtg INJECT 30 UNITS INTO THE SKIN THREE TIMES A DAY WITH MEALS. MAX DAILY DOSE 100 UNITS PER DAY    nystatin (MYCOSTATIN) cream Apply topically 3 (three) times daily as needed for Dry Skin.    olmesartan (BENICAR) 40 MG tablet Take 1 tablet (40 mg total) by mouth nightly.    ondansetron (ZOFRAN) 4 MG tablet Take 1 tablet (4 mg total) by mouth every 8 (eight) hours as needed for Nausea.    OXYGEN-AIR DELIVERY SYSTEMS MISC by Misc.(Non-Drug; Combo Route) route.    pen needle, diabetic (BD ULTRA-FINE RAHUL PEN NEEDLE) 32 gauge x 5/32" Ndle Use to inject insulin 5 times daily    potassium chloride (MICRO-K) 10 MEQ CpSR Take 10 mEq by mouth once daily.    predniSONE (DELTASONE) 10 MG tablet Take 1 tablet (10 mg total) by mouth once daily.    predniSONE (DELTASONE) 2.5 MG tablet Take 1 tablet (2.5 mg total) by mouth once daily.    predniSONE (DELTASONE) 2.5 MG tablet TAKE 3 TABLETS (7.5 MG TOTAL) BY MOUTH ONCE DAILY.    predniSONE (DELTASONE) 5 MG tablet TAKE 1 TABLET BY MOUTH EVERY DAY    PROAIR HFA 90 mcg/actuation inhaler     spironolactone (ALDACTONE) 25 MG tablet TAKE 1 TABLET BY MOUTH EVERY DAY    sulfamethoxazole-trimethoprim 800-160mg (BACTRIM DS) 800-160 mg " Tab TAKE 1 TABLET BY MOUTH ON MON,WEDS, FRIDAY    vortioxetine (TRINTELLIX) 10 mg Tab Take 10 mg by mouth once daily at 6am.       Allergies:   Review of patient's allergies indicates:   Allergen Reactions    Enbrel [etanercept] Shortness Of Breath     CHF    Nsaids (non-steroidal anti-inflammatory drug) Other (See Comments)     hypertention  Other reaction(s): Other (See Comments)  hypertention  HTN    Statins-hmg-coa reductase inhibitors Other (See Comments)     Heart arrythemias  Other reaction(s): Other (See Comments)  Heart arrythemias  Joint pain and cardiac arrythmias    Pcn [penicillins] Rash       ROS:  Review of Systems   Constitutional:  Positive for unexpected weight change. Negative for appetite change, chills and fever.   HENT:  Negative for congestion and sore throat.    Respiratory:  Negative for cough and shortness of breath.    Cardiovascular:  Negative for chest pain and palpitations.   Gastrointestinal:  Negative for abdominal pain, nausea and vomiting.   Genitourinary:  Positive for testicular pain. Negative for dysuria and hematuria.   Musculoskeletal:  Positive for arthralgias. Negative for back pain.   Skin:  Positive for wound. Negative for pallor and rash.   Allergic/Immunologic: Negative for environmental allergies and food allergies.   Neurological:  Positive for weakness. Negative for numbness and headaches.   Hematological:  Does not bruise/bleed easily.   Psychiatric/Behavioral:  Negative for sleep disturbance. The patient is not nervous/anxious.        Objective:  Temp:  [98 °F (36.7 °C)-98.2 °F (36.8 °C)]   Pulse:  []   Resp:  [16-18]   BP: (124-140)/(68-85)   SpO2:  [93 %-96 %]     Body mass index is 28.53 kg/m².      Physical Exam  Vitals and nursing note reviewed.   Constitutional:       General: He is not in acute distress.     Appearance: He is well-developed. He is not diaphoretic.   HENT:      Head: Normocephalic and atraumatic.      Right Ear: External ear normal.       Left Ear: External ear normal.      Mouth/Throat:      Mouth: Mucous membranes are moist.      Pharynx: Uvula midline. No oropharyngeal exudate.   Cardiovascular:      Rate and Rhythm: Normal rate and regular rhythm.      Heart sounds: S1 normal and S2 normal. No murmur heard.  Pulmonary:      Effort: Pulmonary effort is normal. No respiratory distress.      Breath sounds: Normal breath sounds. No wheezing or rales.   Abdominal:      General: Bowel sounds are normal. There is no distension.      Palpations: Abdomen is soft.      Tenderness: There is no abdominal tenderness. There is no guarding or rebound.   Musculoskeletal:         General: No tenderness.      Right hand: No swelling or tenderness.      Left hand: No swelling or tenderness.      Cervical back: Neck supple. No muscular tenderness.      Right lower leg: No edema.      Left lower leg: No edema.   Lymphadenopathy:      Cervical:      Right cervical: No superficial cervical adenopathy.     Left cervical: No superficial cervical adenopathy.      Upper Body:      Right upper body: No supraclavicular adenopathy.      Left upper body: No supraclavicular adenopathy.   Skin:     General: Skin is warm and dry.      Coloration: Skin is not pale.      Findings: Bruising present. No erythema.   Neurological:      Mental Status: He is alert and oriented to person, place, and time.      Motor: No tremor or seizure activity.   Psychiatric:         Mood and Affect: Mood normal.         Behavior: Behavior normal. Behavior is cooperative.         Thought Content: Thought content normal.       Significant Labs: A1C:   Recent Labs   Lab 11/15/22  2041   HGBA1C 7.6*     TSH:   Recent Labs   Lab 10/26/22  2257   TSH 4.602*     CBC:  Recent Labs   Lab 01/03/23  0440   WBC 7.04   HGB 13.9*   HCT 42.4      MCV 99*     BMP:  Recent Labs   Lab 01/03/23  0440   *   *   K 4.3   CL 96   CO2 27   BUN 14   CREATININE 1.0   CALCIUM 9.6   MG 2.1   PHOS 4.4        Significant Imaging: I have reviewed all pertinent imaging results/findings completed during prior hospitalization.      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.3] 11/15/2022 Yes    Open wound of scrotum and testes [S31.30XA] 01/03/2023 Yes    Hyponatremia [E87.1] 12/01/2022 Yes    Chronic diastolic heart failure [I50.32] 11/16/2022 Yes    Coronary artery disease [I25.10] 03/23/2021 Yes    Polycythemia, secondary [D75.1] 03/10/2021 Yes    Essential hypertension [I10] 11/12/2020 Yes    Type 2 diabetes mellitus with hyperglycemia, with long-term current use of insulin [E11.65, Z79.4] 11/12/2020 Not Applicable    NAFLD (nonalcoholic fatty liver disease) [K76.0] 08/21/2020 Yes      Problems Resolved During this Admission:       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 Moisten gauze with VASHE and wring out excess liquid. Apply to wound bed tucking into all wound tunnels and undermining so that all aspects of the wound bed are covered. Add additional to lightly fill the wound bed (not tightly pack). Cover with telfa island dressing cut in half- then  Abd pads/ mesh underwear. Change per BID or if dressing comes loose or saturated.  Wound care nurse following.    Rheumatoid arthritis   Will assess degree of inflammation with RF, ESR, CRP, and CCP.   Discussed possible restarting prednisone, and he did not wish to do that currently.   Will need follow up with Rheumatology after discharge.      Type 2 Diabetes mellitus with hyperglycemia, with long-term current use of insulin  Continue insulin aspart 14 units TIDWM, insulin detemir 44 units daily and 5 units nightly.  Continue accuchecks qAC and HS. Continue low dose SSI PRN.      Coronary artery  disease  Continue ASA 81 mg daily and metoprolol 50 mg BID.     Chronic diastolic heart failure  Continue digoxin 125mcg daily, diltiazem 120mg BID, and metoprolol 50 mg BID.   Holding furosemide due to poor po intake/ intermittent dizziness     Hypokalemia  Continue KCL 40mEq daily     Conjunctivitis  Continue gentamicin drops tid     Hyperlipidemia  Continue ezetimibe 10mg daily     Dysphoric mood  Continue lexapro 20mg daily    Chronic constipation  Continue linaclotide 72 mcg daily and pericolace BID.     Severe malnutrition  Continue MVI, folic acid.   Encourage oral intake.   Appreciate RD assistance.       Anticipated Disposition:  Home with home health      Future Appointments   Date Time Provider Department Center   1/18/2023  3:00 PM Kevin Lares MD [facility] [id number] [facility]   1/18/2023  3:30 PM Lalitha Stroud RN, CDE [facility] [id number] [facility]   2/2/2023  3:00 PM Shemar Dempsey MD [facility] [id number] [facility]       I certify that SNF services are required to be given on an inpatient basis because Nithin Wagner needs for skilled nursing care and/or skilled rehabilitation are required on a daily basis and such services can only practically be provided in a skilled nursing facility setting and are for an ongoing condition for which she received inpatient care in the hospital.       Conrado Nguyễn MD  Department of Hospital Medicine   [facility] - Skilled Nursing

## 2023-01-04 NOTE — PT/OT/SLP PROGRESS
Occupational Therapy    Not Seen    Nithin Wagner  MRN: [id number]  Room/Bed: [place] [id number]/[id number] A    Pt not seen by OT today secondary to pt refusal due to reported pain and fatigue. Attempted in AM and PM. Plan of care to continue next schedule session.     JAHAIRA Zheng  1/4/2023

## 2023-01-04 NOTE — PLAN OF CARE
Continue regular diet,  recommend probiotic daily, run Vit D test, consider zinc for 14 days, and ascorbic acid  x 30 days, add boost plus TID,RD following  Goals: PO to meet 85% of EEN/EPN with ONS by next RD follow up  Nutrition Goal Status: new     Assessment and Plan  Nutrition Problem:  Moderate Protein-Calorie Malnutrition  Malnutrition in the context of Acute Illness/Injury     Related to (etiology):  Inability to meet physiological needs PO in the presence of sepsis     Signs and Symptoms (as evidenced by):  Energy Intake: <75% of estimated energy requirement for > one month     Muscle Mass Depletion: mild depletion of clavicle region and interosseous muscle , moderate loss to hands and thighs  Weight Loss: 18%x < one month      Interventions(treatment strategy):  General diet  Collaboration with other providers  Commercial beverage(calories,protein) boost plus TID chocolate  Vitamin/mineral supplement therapy- MVI  Vitamin supplement therapy- folic acid, recommend ascorbic acid x 30 days   Mineral  supplement therapy recommend zinc x 14 days  Complimentary medicine- recommend probiotic     Nutrition Diagnosis Status:  New

## 2023-01-04 NOTE — PT/OT/SLP PROGRESS
Physical Therapy      Patient Name:  Nithin Wagner   MRN:  [id number]    Patient not seen today secondary to   pt refusal d/t pain. PT attempts held d/t pt refusal to [facility] d/t pain in both the AM and the PM.  Will follow-up 1/5 per POC.

## 2023-01-05 LAB
POCT GLUCOSE: 161 MG/DL (ref 70–110)
POCT GLUCOSE: 199 MG/DL (ref 70–110)
POCT GLUCOSE: 232 MG/DL (ref 70–110)
POCT GLUCOSE: 262 MG/DL (ref 70–110)
POCT GLUCOSE: 263 MG/DL (ref 70–110)

## 2023-01-05 PROCEDURE — 25000003 PHARM REV CODE 250: Performed by: NURSE PRACTITIONER

## 2023-01-05 PROCEDURE — 11000004 HC SNF PRIVATE

## 2023-01-05 PROCEDURE — 25000003 PHARM REV CODE 250: Performed by: HOSPITALIST

## 2023-01-05 PROCEDURE — 63600175 PHARM REV CODE 636 W HCPCS: Performed by: NURSE PRACTITIONER

## 2023-01-05 RX ORDER — DICLOFENAC SODIUM 10 MG/G
4 GEL TOPICAL 2 TIMES DAILY
Status: DISCONTINUED | OUTPATIENT
Start: 2023-01-05 | End: 2023-01-19 | Stop reason: HOSPADM

## 2023-01-05 RX ADMIN — FOLIC ACID 1 MG: 1 TABLET ORAL at 08:01

## 2023-01-05 RX ADMIN — OXYCODONE HYDROCHLORIDE 15 MG: 5 TABLET ORAL at 01:01

## 2023-01-05 RX ADMIN — ONDANSETRON 4 MG: 4 TABLET, ORALLY DISINTEGRATING ORAL at 10:01

## 2023-01-05 RX ADMIN — THERA TABS 1 TABLET: TAB at 08:01

## 2023-01-05 RX ADMIN — POTASSIUM CHLORIDE 40 MEQ: 1500 TABLET, EXTENDED RELEASE ORAL at 08:01

## 2023-01-05 RX ADMIN — OXYCODONE HYDROCHLORIDE 10 MG: 10 TABLET, FILM COATED, EXTENDED RELEASE ORAL at 09:01

## 2023-01-05 RX ADMIN — ESCITALOPRAM OXALATE 20 MG: 10 TABLET ORAL at 08:01

## 2023-01-05 RX ADMIN — DILTIAZEM HYDROCHLORIDE 120 MG: 120 CAPSULE, COATED, EXTENDED RELEASE ORAL at 08:01

## 2023-01-05 RX ADMIN — GENTAMICIN SULFATE 2 DROP: 3 SOLUTION OPHTHALMIC at 08:01

## 2023-01-05 RX ADMIN — DICLOFENAC 4 G: 10 GEL TOPICAL at 09:01

## 2023-01-05 RX ADMIN — ASPIRIN 81 MG 81 MG: 81 TABLET ORAL at 08:01

## 2023-01-05 RX ADMIN — METOPROLOL TARTRATE 50 MG: 50 TABLET, FILM COATED ORAL at 08:01

## 2023-01-05 RX ADMIN — SENNOSIDES AND DOCUSATE SODIUM 1 TABLET: 50; 8.6 TABLET ORAL at 08:01

## 2023-01-05 RX ADMIN — GENTAMICIN SULFATE 2 DROP: 3 SOLUTION OPHTHALMIC at 02:01

## 2023-01-05 RX ADMIN — OXYCODONE HYDROCHLORIDE 15 MG: 5 TABLET ORAL at 05:01

## 2023-01-05 RX ADMIN — INSULIN DETEMIR 44 UNITS: 100 INJECTION, SOLUTION SUBCUTANEOUS at 09:01

## 2023-01-05 RX ADMIN — LIDOCAINE 1 PATCH: 50 PATCH CUTANEOUS at 12:01

## 2023-01-05 RX ADMIN — INSULIN ASPART 14 UNITS: 100 INJECTION, SOLUTION INTRAVENOUS; SUBCUTANEOUS at 08:01

## 2023-01-05 RX ADMIN — CETIRIZINE HYDROCHLORIDE 10 MG: 5 TABLET, FILM COATED ORAL at 08:01

## 2023-01-05 RX ADMIN — INSULIN ASPART 14 UNITS: 100 INJECTION, SOLUTION INTRAVENOUS; SUBCUTANEOUS at 05:01

## 2023-01-05 RX ADMIN — INSULIN DETEMIR 5 UNITS: 100 INJECTION, SOLUTION SUBCUTANEOUS at 10:01

## 2023-01-05 RX ADMIN — OXYCODONE HYDROCHLORIDE 10 MG: 10 TABLET, FILM COATED, EXTENDED RELEASE ORAL at 08:01

## 2023-01-05 RX ADMIN — FAMOTIDINE 20 MG: 20 TABLET ORAL at 08:01

## 2023-01-05 RX ADMIN — HEPARIN SODIUM 5000 UNITS: 5000 INJECTION INTRAVENOUS; SUBCUTANEOUS at 10:01

## 2023-01-05 RX ADMIN — OXYCODONE HYDROCHLORIDE 15 MG: 5 TABLET ORAL at 07:01

## 2023-01-05 RX ADMIN — DIGOXIN 125 MCG: 125 TABLET ORAL at 08:01

## 2023-01-05 RX ADMIN — DICLOFENAC 4 G: 10 GEL TOPICAL at 08:01

## 2023-01-05 RX ADMIN — OXYCODONE HYDROCHLORIDE 15 MG: 5 TABLET ORAL at 11:01

## 2023-01-05 RX ADMIN — HEPARIN SODIUM 5000 UNITS: 5000 INJECTION INTRAVENOUS; SUBCUTANEOUS at 02:01

## 2023-01-05 RX ADMIN — INSULIN ASPART 14 UNITS: 100 INJECTION, SOLUTION INTRAVENOUS; SUBCUTANEOUS at 12:01

## 2023-01-05 RX ADMIN — HEPARIN SODIUM 5000 UNITS: 5000 INJECTION INTRAVENOUS; SUBCUTANEOUS at 05:01

## 2023-01-05 NOTE — PT/OT/SLP PROGRESS
"Physical Therapy      Patient Name:  Nithin Wagner   MRN:  [id number]    Patient not seen today secondary to refusal to participate citing multiple reasons including pain, rheumatoid arthritis, and a conversation with his doctor regarding his "malnourishment". Writing therapist attempted to educate pt and encourage pt to participate without success. Pt adamantly closed to these attempts and rudely argumentative towards writing therapist with pt making statement that he would like a different therapist. Writing therapist excuses self from room to de-escalate situation. Nurse, Nurse practitioner, and therapy supervisor informed of situation. Pt to be re-assigned for future attempts with different therapist.    "

## 2023-01-05 NOTE — CLINICAL REVIEW
Clinical Pharmacy Chart Review Note      Admit Date: 12/31/2022   LOS: 5 days       Nithin Wagner is a 67 y.o. 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     Review of patient's allergies indicates:   Allergen Reactions    Enbrel [etanercept] Shortness Of Breath     CHF    Nsaids (non-steroidal anti-inflammatory drug) Other (See Comments)     hypertention  Other reaction(s): Other (See Comments)  hypertention  HTN    Statins-hmg-coa reductase inhibitors Other (See Comments)     Heart arrythemias  Other reaction(s): Other (See Comments)  Heart arrythemias  Joint pain and cardiac arrythmias    Pcn [penicillins] Rash     Patient Active Problem List    Diagnosis Date Noted    Open wound of scrotum and testes 01/03/2023    Hyponatremia 12/01/2022    Chronic diastolic heart failure 11/16/2022    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    Fatigue 03/09/2022    Rheumatoid arthritis flare 07/12/2021    Chronic use of opiate drug for therapeutic purpose     Coronary artery disease 03/23/2021    Smoker unmotivated to quit 03/10/2021    Polycythemia, secondary 03/10/2021    Canker sores oral 03/10/2021    Essential hypertension 11/12/2020    Systolic heart failure 11/12/2020    Type 2 diabetes mellitus with hyperglycemia, with long-term current use of insulin 11/12/2020    Class 1 obesity due to excess calories with  serious comorbidity and body mass index (BMI) of 34.0 to 34.9 in adult 11/12/2020    NAFLD (nonalcoholic fatty liver disease) 08/21/2020    Hepatitis B core antibody positive 04/24/2020       Scheduled Meds:    aspirin  81 mg Oral Daily    cetirizine  10 mg Oral Daily    diclofenac sodium  4 g Topical (Top) BID    digoxin  125 mcg Oral Daily    diltiaZEM  120 mg Oral Q12H    EScitalopram oxalate  20 mg Oral QHS    ezetimibe  10 mg Oral Daily    famotidine  20 mg Oral BID    folic acid  1 mg Oral Daily    gentamicin  2 drop Left Eye TID    heparin (porcine)  5,000 Units Subcutaneous Q8H    insulin aspart U-100  14 Units Subcutaneous TIDWM    insulin detemir U-100  44 Units Subcutaneous Daily    insulin detemir U-100  5 Units Subcutaneous QHS    LIDOcaine  1 patch Transdermal Q24H    linaCLOtide  72 mcg Oral Before breakfast    metoprolol tartrate  50 mg Oral Q12H    multivitamin  1 tablet Oral Daily    oxyCODONE  10 mg Oral Q12H    potassium chloride  40 mEq Oral Daily    senna-docusate 8.6-50 mg  1 tablet Oral BID     Continuous Infusions:   PRN Meds: acetaminophen, albuterol-ipratropium, benzonatate, bisacodyL, calcium carbonate, dextrose 10%, dextrose 10%, glucagon (human recombinant), glucose, glucose, hydrOXYzine, insulin aspart U-100, melatonin, naloxone, ondansetron, oxyCODONE, promethazine    OBJECTIVE:     Vital Signs (Last 24H)  Temp:  [98.1 °F (36.7 °C)-98.5 °F (36.9 °C)]   Pulse:  [95-98]   Resp:  [17-99]   BP: (113-138)/(74)   SpO2:  [95 %]     Laboratory:  CBC:   Recent Labs   Lab 01/03/23  0440   WBC 7.04   RBC 4.29*   HGB 13.9*   HCT 42.4      MCV 99*   MCH 32.4*   MCHC 32.8     BMP:   Recent Labs   Lab 01/03/23  0440   *   *   K 4.3   CL 96   CO2 27   BUN 14   CREATININE 1.0   CALCIUM 9.6   MG 2.1     CMP:   Recent Labs   Lab 01/03/23  0440   *   CALCIUM 9.6   *   K 4.3   CO2 27   CL 96   BUN 14   CREATININE 1.0       Lab Results   Component Value Date    DIGOXIN 0.4  (L) 11/15/2022     Lab Results   Component Value Date    HGBA1C 7.6 (H) 11/15/2022         ASSESSMENT/PLAN:     I have reviewed the medications in compliance with CMS Regulation F756 of the [place]. Based on information gathered, the following items may need to be addressed:    **According to PMH and home medication list, patient takes the following medications at home. These medications are not currently ordered at [facility]:  Linzess 72 mcg daily (non-formulary)  Olmesartan 40 mg nightly (non-formulary)  Spironolactone 25  mg daily  Tresiba 50u twice daily (non-formulary, currently taking Levemir)  Trintellix 10 mg daily (non-formulary, currently taking escitalopram)  Trulicity 4.5 mg into skin every 7 days (non-formulary)  Vitamin D 50,000u weekly    **Patient is taking escitalopram (replaces home Trintellix) for depression. A gradual dose reduction is not recommended at this time. Patient to follow-up with prescribing MD as outpatient.  Monitor: mental status for depression, suicide ideation, anxiety, serotonin syndrome, hyponatremia, dizziness, drowsiness, somnolence    **Hydroxyzine ordered as needed for itching. This medication is a Beers drug and is not recommended for use in older patients. Monitor use and associated side effects. Consider discontinuation if patient is not using or adverse effects observed.  Beers Criteria: Avoid use of first generation antihistamines in older adults due to strong anticholinergic effects and reduced clearance with advanced age, which increase the risk of anticholinergic effects and toxicity. Tolerance develops when used as a hypnotic. In particular avoid in patients with delirium or at high risk of delirium as it may induce or worsen delirium, in patients with dementia or cognitive impairment because of adverse CNS effects, and in men with lower urinary tract symptoms or benign prostatic hyperplasia as decreased urinary flow and urinary retention may occur      Medications reviewed  by PharmD, please re-consult if needed.      Valeria Monet, Pharm. D.  Clinical Pharmacist  [facility]
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
## 2023-01-05 NOTE — PT/OT/SLP PROGRESS
"Occupational Therapy    Not Seen    Nithin Wagner  MRN: [id number]  Room/Bed: [id number] A    Patient not seen today by OT secondary to refusal to participate citing multiple reasons including pain, rheumatoid arthritis, and a conversation with his doctor regarding his "malnourishment". Pt with multiple attempts by therapy. Plan of care to continue next scheduled session.     JAHAIRA Zheng  1/5/2023     "

## 2023-01-05 NOTE — ED PROVIDER NOTES
Encounter Date: 10/20/2022       History     Chief Complaint   Patient presents with    Chest Pain     Pt c/o chest pain, abdominal pain, and increased SOB x2 days. Pt states he has an echo scheduled for the morning. PMH of CHF Pt is on 40 mg lasix at home.      Mr Wagner is a 67 year old man with a history of HFpEF, CAD s/p multiple stents, and HLD presenting to the ED for new onset abdominal pain that began 2 days ago. A couple of weeks ago he started to feel constipated for which he used fleets and milk of magnesia with some success. 10 days ago while bearing down he felt a pop in his chest/back for which he received an outpatient X ray which showed thoracic spondylosis. He continued to feel constipated and he says he cleared his bowels this weekend. Starting Monday he began to experience shifting abdominal pain based on his position. His last bowel movement was Monday and he has been able to pass gas. Previous history of CAD with multiple stents placed in RCA in 2021 for which he completed his DAPT. No c/o associated chest pain, sob, headache, lightheadedness. Does c/o n/v. Takes his medications as prescribed and is on chronic opioids for RA.      Review of patient's allergies indicates:   Allergen Reactions    Enbrel [etanercept] Shortness Of Breath     CHF    Nsaids (non-steroidal anti-inflammatory drug) Other (See Comments)     hypertention  Other reaction(s): Other (See Comments)  hypertention  HTN    Statins-hmg-coa reductase inhibitors Other (See Comments)     Heart arrythemias  Other reaction(s): Other (See Comments)  Heart arrythemias  Joint pain and cardiac arrythmias    Pcn [penicillins] Rash     Past Medical History:   Diagnosis Date    Arthritis     Atrial myxoma     Coronary atherosclerosis     Diabetes mellitus, type 2     Difficult intubation     Hepatitis B     Hyperlipidemia     Hypertension     Non-alcoholic fatty liver disease     Rheumatoid arthritis     Rheumatoid arthritis flare 07/12/2021     Stroke     TIA    Systolic heart failure      Past Surgical History:   Procedure Laterality Date    CORONARY ANGIOGRAPHY N/A 3/10/2021    Procedure: ANGIOGRAM, CORONARY ARTERY - right radial;  Surgeon: Shemar Dempsey MD;  Location: [facility], [place], operated by [facility] CATH LAB;  Service: Cardiology;  Laterality: N/A;    CORONARY STENT PLACEMENT  03/10/2021    prox-mid RCA Marshal 4.5 x 26 mm, 4.5 x 12 mm    LUNG LOBECTOMY Right 2008    RUL lobectomy after removal of atrial myxoma    PLEURA BIOPSY      RESECTION OF ATRIAL MYXOMA  2007     Family History   Problem Relation Age of Onset    Hodgkin's lymphoma Mother     Diabetes type II Mother     Kidney failure Father     Diabetes type I Father     Cancer Sister      Social History     Tobacco Use    Smoking status: Every Day     Packs/day: 1.00     Years: 35.00     Pack years: 35.00     Types: Cigarettes    Smokeless tobacco: Never   Substance Use Topics    Alcohol use: Not Currently    Drug use: No     Review of Systems   Constitutional:  Negative for chills and fever.   HENT:  Negative for congestion and sore throat.    Respiratory:  Negative for cough and shortness of breath.    Cardiovascular:  Negative for chest pain.   Gastrointestinal:  Positive for constipation, nausea and vomiting.   Genitourinary:  Negative for dysuria and urgency.   Musculoskeletal:  Negative for gait problem.   Skin:  Negative for color change.   Neurological:  Negative for dizziness and light-headedness.   Psychiatric/Behavioral:  Negative for agitation and confusion.      Physical Exam     Initial Vitals [10/20/22 0034]   BP Pulse Resp Temp SpO2   (!) 156/96 (!) 114 16 98 °F (36.7 °C) 100 %      MAP       --         Physical Exam    Constitutional: He appears well-developed and well-nourished.   HENT:   Head: Normocephalic and atraumatic.   Eyes: Conjunctivae and EOM are normal. Pupils are equal, round, and reactive to light.   Neck: Neck supple.   Normal range of motion.  Cardiovascular:  Normal rate and regular rhythm.            Pulmonary/Chest: He has wheezes.   Abdominal: He exhibits distension.   Musculoskeletal:         General: Normal range of motion.      Cervical back: Normal range of motion and neck supple.     Neurological: He is alert and oriented to person, place, and time.   Skin: Skin is warm and dry. Capillary refill takes less than 2 seconds.   Psychiatric: He has a normal mood and affect. Thought content normal.       ED Course   Procedures  Labs Reviewed   CBC W/ AUTO DIFFERENTIAL - Abnormal; Notable for the following components:       Result Value    Hemoglobin 18.9 (*)     Hematocrit 54.3 (*)     MCH 34.1 (*)     Platelets 116 (*)     Platelet Estimate Decreased (*)     All other components within normal limits   COMPREHENSIVE METABOLIC PANEL - Abnormal; Notable for the following components:    Glucose 126 (*)     ALT 47 (*)     eGFR 59.5 (*)     All other components within normal limits   ISTAT PROCEDURE - Abnormal; Notable for the following components:    POC Glucose 126 (*)     POC TCO2 (MEASURED) 32 (*)     POC Ionized Calcium 1.01 (*)     POC Hematocrit 56 (*)     All other components within normal limits   INFLUENZA A & B BY MOLECULAR   TROPONIN I   LACTIC ACID, PLASMA   LIPASE   SARS-COV-2 RNA AMPLIFICATION, QUAL   LIPASE   TROPONIN I          Imaging Results              CT Abdomen Pelvis With Contrast (Final result)  Result time 10/20/22 03:47:59      Final result by Jj Ortez MD (10/20/22 03:47:59)                   Impression:      Diffuse dilatation of stool filled large bowel without evidence of transition point, likely constipation.  Correlate clinically.    Bilateral nonobstructive nephrolithiasis.    Partially visual right middle lobe patchy ground-glass opacity, recommend correlation with underlying infection, inflammation or aspiration.    Additional findings as above.    Electronically signed by resident: Evan Bhatt  Date:    10/20/2022  Time:    02:59    Electronically signed  by: Jj Ortez MD  Date:    10/20/2022  Time:    03:47               Narrative:    EXAMINATION:  CT ABDOMEN PELVIS WITH CONTRAST    CLINICAL HISTORY:  Bowel obstruction suspected;Abdominal pain, acute, nonlocalized;    TECHNIQUE:  Axial images of the abdomen and pelvis were acquired after the use of 100 cc [id number] IV contrast.  Coronal and sagittal reconstructions were also obtained    COMPARISON:  CT abdomen with contrast 01/24/2022    FINDINGS:  Lower thorax:    Heart: Normal in size. No pericardial effusion. Multivessel calcific atherosclerosis of the coronaries with stenting.    Deisy/Mediastinum: No significant mediastinal, hilar, or axillary lymphadenopathy    Lungs: Trachea and bronchi are patent.  Partially visualized ground-glass opacities with interlobular thickening of the right middle lobe, suggesting infection or inflammation.  Right lung base linear opacity of subsegmental atelectasis versus scarring.  No pleural effusion.    Liver: Normal in size and contour.  No focal hepatic lesion.    Gallbladder: No calcified gallstones.    Bile Ducts: No evidence of dilated ducts.    Pancreas: No mass or peripancreatic fat stranding.    Spleen: Unremarkable.    Stomach and duodenum: Unremarkable.    Adrenals: Nodular adrenal thickening, left more than right, similar to prior.    Kidneys/ Ureters: Normal in size and location. Normal enhancement. Bilateral nonobstructive nephrolithiasis.  No ureteral dilatation.  Right 1.8 cm renal cyst.  Stable bilateral nonspecific perinephric fat stranding    Bladder: No evidence of wall thickening.    Reproductive organs: Unremarkable.    GI/Mesentery: Small bowel is normal in caliber with no evidence of obstruction.  No evidence of inflammation or wall thickening. Appendix is identified and is unremarkable.  Diffuse large bowel dilatation measuring 9 cm without transition point.  Stool is noted throughout the large bowel.  No free air.  No ascites.    Lymph nodes: No  lymphadenapathy.    Abdominal wall: Metal clips right inguinal region.    Vasculature: No aneurysm. Moderate calcific atherosclerosis.    Bones: Degenerative changes.  No acute fracture. No suspicious osseous lesions.                                       X-Ray Chest 1 View (Final result)  Result time 10/20/22 03:04:18      Final result by Jj Ortez MD (10/20/22 03:04:18)                   Impression:      Chronic opacities in the lungs, right more than left, similar to prior.    No significant change from prior study.      Electronically signed by: Jj Ortez MD  Date:    10/20/2022  Time:    03:04               Narrative:    EXAMINATION:  XR CHEST 1 VIEW    CLINICAL HISTORY:  Heart failure, unspecified    TECHNIQUE:  Single frontal view of the chest was performed.    COMPARISON:  08/20/2021.    FINDINGS:  Chronic opacities in the lungs, right more than left, similar to prior.    Heart and lungs  appear unchanged when allowing for differences in technique and positioning.                                       Medications   methylnaltrexone 12 mg/0.6 mL subcutaneous injection 12 mg (12 mg Subcutaneous Not Given 10/20/22 0400)   iohexoL (OMNIPAQUE 350) injection 100 mL (100 mLs Intravenous Given 10/20/22 0212)   HYDROcodone-acetaminophen  mg per tablet 1 tablet (1 tablet Oral Given 10/20/22 0334)   predniSONE tablet 20 mg (20 mg Oral Given 10/20/22 0334)   furosemide injection 40 mg (40 mg Intravenous Given 10/20/22 0402)     Medical Decision Making:   Initial Assessment:   I obtained history from patient. Given HPI need to rule out acute abdomen but on physical exam is not reporting pain out of proportion and says he is passing gas. History of CAD and HLD could point towards chronic mesenteric ischemia but HPI doesn't suggest this as much. Not describing anything representative of ischemic chest pain, no EKG changes suggestive of STEMI, and lack of associated sob make cardiac etiology less  likely. Most likely related to constipation 2/2 chronic opioid use for RA.  Differential Diagnosis:   Constipation 2/2 opioids, chronic mesenteric ischemia, acute abdomen, ACS  Clinical Tests:   Lab Tests: Ordered  Radiological Study: Ordered  Medical Tests: Ordered  ED Management:  1:36 AM  -cbc, cmp, bnp, troponin, lipase, lactate  -ekg  -cxr, ct a/p with contrast  -continuous cardiac monitoring  2:43 AM  -says his abdominal pain is slightly better. Still passing gas.  -ekg not concerning for ACS  -f/u ct and cxr reads. Unofficial read of CT doesn't show any rectal stool content for disimpaction nor does there seem to be a SBO          Attending Attestation:   Physician Attestation Statement for Resident:  As the supervising MD   Physician Attestation Statement: I have personally seen and examined this patient.   I agree with the above history.  -:   As the supervising MD I agree with the above PE.     As the supervising MD I agree with the above treatment, course, plan, and disposition.   -:   67-year-old male with above history presenting to emergency department complaint of abdominal pain, distension, constipation, as well as sensation of shortness of breath.  He is tachycardic, hemodynamically stable, hypoxic with normal work of breathing.  No leukocytosis.  CMP unremarkable.  Initial lactate and troponin negative.  COVID and flu negative.  Chest x-ray stable from previous.  CT of the abdomen and pelvis with constipation but no bowel obstruction.  Incidental finding of a possible infiltrate.  Given his hypoxia, I recommended to him that we keep him for antibiotics and diuresis, as patient states he had been drinking quite a bit of water in an attempt to have a bowel movement, and believed he was fluid overloaded.  Patient had 3 very large bowel movements while here in the emergency department and stated that he could not stay.  He has a critically ill family member and needs to be present at their bedside  today.    I have interviewed and examined the patient. The patient wishes to leave the emergency department against medical advice.    - I have determined that the patient has the capacity to understand the information relevant to their care.   - The patient also understands the consequences of the various options after we discussed relevant information.   - I feel that the patient is able to understand the relevant information and responds appropriately to questions.   - I have attempted to persuade the patient to stay to receive care.   - The patient understands by leaving there is a possibility of death, disability, or serious injury.  - Despite the above information, the patient had made the decision to leave against medical advice.   - I have attempted to persuade the patient to follow up with a physician as soon as possible.   - I have also notified the patient they may return at any time to the ED for further care.   I have reviewed and agree with the residents interpretation of the following: lab data and x-rays.  I have reviewed the following: old records at this facility.                           Clinical Impression:   Final diagnoses:  [R07.9] Chest pain  [I50.9] Heart failure  [K59.00] Constipation, unspecified constipation type (Primary)  [R10.9] Abdominal pain, unspecified abdominal location  [R09.02] Hypoxia  [J18.9] Pneumonia due to infectious organism, unspecified laterality, unspecified part of lung  [Z53.29] Left against medical advice        ED Disposition Condition    AMA Stable                Justice Cardoza MD  Resident  10/20/22 0246       Wandy Morales MD  10/20/22 6211     COUGH/HEADACHE/PAIN/REDNESS

## 2023-01-05 NOTE — PLAN OF CARE
Problem: Adult Inpatient Plan of Care  Goal: Plan of Care Review  Outcome: Ongoing, Progressing  Goal: Patient-Specific Goal (Individualized)  Outcome: Ongoing, Progressing  Goal: Absence of Hospital-Acquired Illness or Injury  Outcome: Ongoing, Progressing  Goal: Optimal Comfort and Wellbeing  Outcome: Ongoing, Progressing  Goal: Readiness for Transition of Care  Outcome: Ongoing, Progressing     Problem: Diabetes Comorbidity  Goal: Blood Glucose Level Within Targeted Range  Outcome: Ongoing, Progressing     Problem: Fall Injury Risk  Goal: Absence of Fall and Fall-Related Injury  Outcome: Ongoing, Progressing      Pharmacist Admission Medication Reconciliation Pending Note    Prior to Admission Medications were reviewed by the pharmacist and pended for provider review during admission medication reconciliation.    Medications were pended by the pharmacist at this time as follows:    Pended Admission Order Reconciliation Actions     Order Name Action Reordered As    aspirin 81 MG tablet Order for Admission aspirin chewable 81 mg    Misc Natural Products (GLUCOSAMINE-CHONDROITIN SULF) TABS Do Not Order for Admission     Omega-3 Fatty Acids (FISH OIL PO) Order for Admission omega-3 acid ethyl esters (LOVAZA) capsule 1 g    vitamin - therapeutic multivitamins w/minerals (CENTRUM SILVER,THERA-M) TABS Order for Admission vitamin - therapeutic multivitamins w/minerals (CENTRUM SILVER,THERA-M) 1 tablet    levothyroxine (SYNTHROID, LEVOTHROID) 150 MCG tablet Order for Admission levothyroxine (SYNTHROID, LEVOTHROID) tablet 150 mcg    B Complex-C (SUPER B COMPLEX PO) Do Not Order for Admission     Sotalol HCl, AF, 120 MG Tab Do Not Order for Admission     warfarin (COUMADIN) 5 MG tablet Order for Admission warfarin (COUMADIN) tablet 5 mg            Orders Pended To Continue For Hospital Stay     ID Description Pended By When Reason    [id number] aspirin chewable 81 mg-EVERY EVENING Tanvicaleb Castellano [facility] 05/04/17 1740     [id number] levothyroxine (SYNTHROID, LEVOTHROID) tablet 150 mcg-DAILY BEFORE BREAKFAST [facility]     [id number] omega-3 acid ethyl esters (LOVAZA) capsule 1 g-EVERY EVENING [facility] 1740     [id number] vitamin - therapeutic multivitamins w/minerals (CENTRUM SILVER,THERA-M) 1 tablet-EVERY EVENING Tanvicaleb Castellano [facility] 05/04/17 1740     [id number] warfarin (COUMADIN) tablet 5 mg-EVERY EVENING Tanvicaleb Castellano [facility] 05/04/17 1740             Pharmacist Notations:     1) Held sotalol, patient on esmolol gtt.    Last edited by Tanvi Castellano [facility] on 05/04/17  at 1740          Orders that are ultimately reconciled and signed during admission medication reconciliation may differ from the pended actions above.    Please contact the pharmacist for questions.    Tanvi Castellano RPH  5/4/2017 5:40 PM

## 2023-01-06 LAB
ALBUMIN SERPL BCP-MCNC: 2.7 G/DL (ref 3.5–5.2)
ALP SERPL-CCNC: 90 U/L (ref 55–135)
ALT SERPL W/O P-5'-P-CCNC: 20 U/L (ref 10–44)
ANION GAP SERPL CALC-SCNC: 8 MMOL/L (ref 8–16)
AST SERPL-CCNC: 23 U/L (ref 10–40)
BASOPHILS # BLD AUTO: 0.1 K/UL (ref 0–0.2)
BASOPHILS NFR BLD: 1.7 % (ref 0–1.9)
BILIRUB DIRECT SERPL-MCNC: 0.2 MG/DL (ref 0.1–0.3)
BILIRUB SERPL-MCNC: 0.4 MG/DL (ref 0.1–1)
BILIRUB UR QL STRIP: NEGATIVE
BUN SERPL-MCNC: 11 MG/DL (ref 8–23)
CALCIUM SERPL-MCNC: 9.4 MG/DL (ref 8.7–10.5)
CCP AB SER IA-ACNC: 511.4 U/ML
CHLORIDE SERPL-SCNC: 98 MMOL/L (ref 95–110)
CLARITY UR REFRACT.AUTO: ABNORMAL
CO2 SERPL-SCNC: 26 MMOL/L (ref 23–29)
COLOR UR AUTO: YELLOW
CREAT SERPL-MCNC: 0.9 MG/DL (ref 0.5–1.4)
CRP SERPL-MCNC: 19.7 MG/L (ref 0–8.2)
DIFFERENTIAL METHOD: ABNORMAL
EOSINOPHIL # BLD AUTO: 0.4 K/UL (ref 0–0.5)
EOSINOPHIL NFR BLD: 6 % (ref 0–8)
ERYTHROCYTE [DISTWIDTH] IN BLOOD BY AUTOMATED COUNT: 12.2 % (ref 11.5–14.5)
ERYTHROCYTE [SEDIMENTATION RATE] IN BLOOD BY PHOTOMETRIC METHOD: 78 MM/HR (ref 0–23)
EST. GFR  (NO RACE VARIABLE): >60 ML/MIN/1.73 M^2
GLUCOSE SERPL-MCNC: 288 MG/DL (ref 70–110)
GLUCOSE UR QL STRIP: ABNORMAL
HCT VFR BLD AUTO: 41.2 % (ref 40–54)
HGB BLD-MCNC: 13.4 G/DL (ref 14–18)
HGB UR QL STRIP: NEGATIVE
IMM GRANULOCYTES # BLD AUTO: 0.03 K/UL (ref 0–0.04)
IMM GRANULOCYTES NFR BLD AUTO: 0.5 % (ref 0–0.5)
KETONES UR QL STRIP: NEGATIVE
LEUKOCYTE ESTERASE UR QL STRIP: NEGATIVE
LYMPHOCYTES # BLD AUTO: 1.8 K/UL (ref 1–4.8)
LYMPHOCYTES NFR BLD: 31.6 % (ref 18–48)
MAGNESIUM SERPL-MCNC: 1.8 MG/DL (ref 1.6–2.6)
MCH RBC QN AUTO: 32.1 PG (ref 27–31)
MCHC RBC AUTO-ENTMCNC: 32.5 G/DL (ref 32–36)
MCV RBC AUTO: 99 FL (ref 82–98)
MONOCYTES # BLD AUTO: 0.7 K/UL (ref 0.3–1)
MONOCYTES NFR BLD: 12.8 % (ref 4–15)
NEUTROPHILS # BLD AUTO: 2.7 K/UL (ref 1.8–7.7)
NEUTROPHILS NFR BLD: 47.4 % (ref 38–73)
NITRITE UR QL STRIP: NEGATIVE
NRBC BLD-RTO: 0 /100 WBC
PH UR STRIP: 6 [PH] (ref 5–8)
PHOSPHATE SERPL-MCNC: 4.2 MG/DL (ref 2.7–4.5)
PLATELET # BLD AUTO: 267 K/UL (ref 150–450)
PMV BLD AUTO: 12.3 FL (ref 9.2–12.9)
POCT GLUCOSE: 214 MG/DL (ref 70–110)
POCT GLUCOSE: 220 MG/DL (ref 70–110)
POCT GLUCOSE: 278 MG/DL (ref 70–110)
POCT GLUCOSE: 287 MG/DL (ref 70–110)
POTASSIUM SERPL-SCNC: 4.1 MMOL/L (ref 3.5–5.1)
PROCALCITONIN SERPL IA-MCNC: 0.03 NG/ML
PROT SERPL-MCNC: 6.4 G/DL (ref 6–8.4)
PROT UR QL STRIP: ABNORMAL
RBC # BLD AUTO: 4.17 M/UL (ref 4.6–6.2)
RHEUMATOID FACT SERPL-ACNC: 914 IU/ML (ref 0–15)
SARS-COV-2 RNA RESP QL NAA+PROBE: NOT DETECTED
SODIUM SERPL-SCNC: 132 MMOL/L (ref 136–145)
SP GR UR STRIP: 1.02 (ref 1–1.03)
URN SPEC COLLECT METH UR: ABNORMAL
WBC # BLD AUTO: 5.79 K/UL (ref 3.9–12.7)

## 2023-01-06 PROCEDURE — 84100 ASSAY OF PHOSPHORUS: CPT | Performed by: NURSE PRACTITIONER

## 2023-01-06 PROCEDURE — 25000003 PHARM REV CODE 250: Performed by: STUDENT IN AN ORGANIZED HEALTH CARE EDUCATION/TRAINING PROGRAM

## 2023-01-06 PROCEDURE — 84145 PROCALCITONIN (PCT): CPT | Performed by: HOSPITALIST

## 2023-01-06 PROCEDURE — 63600175 PHARM REV CODE 636 W HCPCS: Performed by: NURSE PRACTITIONER

## 2023-01-06 PROCEDURE — 86140 C-REACTIVE PROTEIN: CPT | Performed by: HOSPITALIST

## 2023-01-06 PROCEDURE — 97110 THERAPEUTIC EXERCISES: CPT | Mod: CO

## 2023-01-06 PROCEDURE — 97110 THERAPEUTIC EXERCISES: CPT | Mod: CQ

## 2023-01-06 PROCEDURE — 81003 URINALYSIS AUTO W/O SCOPE: CPT | Performed by: STUDENT IN AN ORGANIZED HEALTH CARE EDUCATION/TRAINING PROGRAM

## 2023-01-06 PROCEDURE — 86200 CCP ANTIBODY: CPT | Performed by: HOSPITALIST

## 2023-01-06 PROCEDURE — 25000003 PHARM REV CODE 250: Performed by: NURSE PRACTITIONER

## 2023-01-06 PROCEDURE — 85025 COMPLETE CBC W/AUTO DIFF WBC: CPT | Performed by: NURSE PRACTITIONER

## 2023-01-06 PROCEDURE — 97530 THERAPEUTIC ACTIVITIES: CPT | Mod: CQ

## 2023-01-06 PROCEDURE — 80076 HEPATIC FUNCTION PANEL: CPT | Performed by: HOSPITALIST

## 2023-01-06 PROCEDURE — 11000004 HC SNF PRIVATE

## 2023-01-06 PROCEDURE — 97530 THERAPEUTIC ACTIVITIES: CPT | Mod: CO

## 2023-01-06 PROCEDURE — 86431 RHEUMATOID FACTOR QUANT: CPT | Performed by: HOSPITALIST

## 2023-01-06 PROCEDURE — 80048 BASIC METABOLIC PNL TOTAL CA: CPT | Performed by: NURSE PRACTITIONER

## 2023-01-06 PROCEDURE — 85652 RBC SED RATE AUTOMATED: CPT | Performed by: HOSPITALIST

## 2023-01-06 PROCEDURE — U0005 INFEC AGEN DETEC AMPLI PROBE: HCPCS | Performed by: HOSPITALIST

## 2023-01-06 PROCEDURE — 83735 ASSAY OF MAGNESIUM: CPT | Performed by: NURSE PRACTITIONER

## 2023-01-06 PROCEDURE — 36415 COLL VENOUS BLD VENIPUNCTURE: CPT | Performed by: HOSPITALIST

## 2023-01-06 PROCEDURE — 97116 GAIT TRAINING THERAPY: CPT | Mod: CQ

## 2023-01-06 PROCEDURE — 63600175 PHARM REV CODE 636 W HCPCS: Performed by: STUDENT IN AN ORGANIZED HEALTH CARE EDUCATION/TRAINING PROGRAM

## 2023-01-06 RX ORDER — PREDNISONE 5 MG/1
5 TABLET ORAL DAILY
Status: DISCONTINUED | OUTPATIENT
Start: 2023-01-20 | End: 2023-01-19 | Stop reason: HOSPADM

## 2023-01-06 RX ORDER — ZINC SULFATE 50(220)MG
220 CAPSULE ORAL DAILY
Status: COMPLETED | OUTPATIENT
Start: 2023-01-06 | End: 2023-01-19

## 2023-01-06 RX ORDER — PREDNISONE 10 MG/1
10 TABLET ORAL DAILY
Status: COMPLETED | OUTPATIENT
Start: 2023-01-13 | End: 2023-01-19

## 2023-01-06 RX ORDER — SODIUM CHLORIDE 1 G/1
1000 TABLET ORAL 2 TIMES DAILY
Status: DISCONTINUED | OUTPATIENT
Start: 2023-01-06 | End: 2023-01-10

## 2023-01-06 RX ORDER — DOXYCYCLINE HYCLATE 100 MG
100 TABLET ORAL EVERY 12 HOURS
Status: DISCONTINUED | OUTPATIENT
Start: 2023-01-06 | End: 2023-01-06

## 2023-01-06 RX ORDER — DOXYCYCLINE HYCLATE 100 MG
100 TABLET ORAL EVERY 12 HOURS
Status: COMPLETED | OUTPATIENT
Start: 2023-01-06 | End: 2023-01-11

## 2023-01-06 RX ORDER — PREDNISONE 20 MG/1
20 TABLET ORAL DAILY
Status: COMPLETED | OUTPATIENT
Start: 2023-01-06 | End: 2023-01-12

## 2023-01-06 RX ORDER — PREDNISONE 20 MG/1
20 TABLET ORAL DAILY
Status: DISCONTINUED | OUTPATIENT
Start: 2023-01-06 | End: 2023-01-06

## 2023-01-06 RX ORDER — ASCORBIC ACID 500 MG
500 TABLET ORAL DAILY
Status: DISCONTINUED | OUTPATIENT
Start: 2023-01-06 | End: 2023-01-19 | Stop reason: HOSPADM

## 2023-01-06 RX ADMIN — OXYCODONE HYDROCHLORIDE 15 MG: 5 TABLET ORAL at 04:01

## 2023-01-06 RX ADMIN — SENNOSIDES AND DOCUSATE SODIUM 1 TABLET: 50; 8.6 TABLET ORAL at 08:01

## 2023-01-06 RX ADMIN — OXYCODONE HYDROCHLORIDE 10 MG: 10 TABLET, FILM COATED, EXTENDED RELEASE ORAL at 08:01

## 2023-01-06 RX ADMIN — ASPIRIN 81 MG 81 MG: 81 TABLET ORAL at 08:01

## 2023-01-06 RX ADMIN — OXYCODONE HYDROCHLORIDE 15 MG: 5 TABLET ORAL at 11:01

## 2023-01-06 RX ADMIN — DILTIAZEM HYDROCHLORIDE 120 MG: 120 CAPSULE, COATED, EXTENDED RELEASE ORAL at 08:01

## 2023-01-06 RX ADMIN — FOLIC ACID 1 MG: 1 TABLET ORAL at 08:01

## 2023-01-06 RX ADMIN — DOXYCYCLINE HYCLATE 100 MG: 100 TABLET, COATED ORAL at 09:01

## 2023-01-06 RX ADMIN — INSULIN ASPART 2 UNITS: 100 INJECTION, SOLUTION INTRAVENOUS; SUBCUTANEOUS at 09:01

## 2023-01-06 RX ADMIN — INSULIN ASPART 6 UNITS: 100 INJECTION, SOLUTION INTRAVENOUS; SUBCUTANEOUS at 08:01

## 2023-01-06 RX ADMIN — ESCITALOPRAM OXALATE 20 MG: 10 TABLET ORAL at 09:01

## 2023-01-06 RX ADMIN — METOPROLOL TARTRATE 50 MG: 50 TABLET, FILM COATED ORAL at 08:01

## 2023-01-06 RX ADMIN — OXYCODONE HYDROCHLORIDE 10 MG: 10 TABLET, FILM COATED, EXTENDED RELEASE ORAL at 09:01

## 2023-01-06 RX ADMIN — FAMOTIDINE 20 MG: 20 TABLET ORAL at 08:01

## 2023-01-06 RX ADMIN — INSULIN ASPART 14 UNITS: 100 INJECTION, SOLUTION INTRAVENOUS; SUBCUTANEOUS at 04:01

## 2023-01-06 RX ADMIN — HEPARIN SODIUM 5000 UNITS: 5000 INJECTION INTRAVENOUS; SUBCUTANEOUS at 09:01

## 2023-01-06 RX ADMIN — DIGOXIN 125 MCG: 125 TABLET ORAL at 08:01

## 2023-01-06 RX ADMIN — METOPROLOL TARTRATE 50 MG: 50 TABLET, FILM COATED ORAL at 09:01

## 2023-01-06 RX ADMIN — INSULIN ASPART 4 UNITS: 100 INJECTION, SOLUTION INTRAVENOUS; SUBCUTANEOUS at 04:01

## 2023-01-06 RX ADMIN — SENNOSIDES AND DOCUSATE SODIUM 1 TABLET: 50; 8.6 TABLET ORAL at 09:01

## 2023-01-06 RX ADMIN — Medication 1 CAPSULE: at 02:01

## 2023-01-06 RX ADMIN — GENTAMICIN SULFATE 2 DROP: 3 SOLUTION OPHTHALMIC at 08:01

## 2023-01-06 RX ADMIN — INSULIN DETEMIR 44 UNITS: 100 INJECTION, SOLUTION SUBCUTANEOUS at 08:01

## 2023-01-06 RX ADMIN — OXYCODONE HYDROCHLORIDE AND ACETAMINOPHEN 500 MG: 500 TABLET ORAL at 02:01

## 2023-01-06 RX ADMIN — INSULIN ASPART 14 UNITS: 100 INJECTION, SOLUTION INTRAVENOUS; SUBCUTANEOUS at 08:01

## 2023-01-06 RX ADMIN — OXYCODONE HYDROCHLORIDE 15 MG: 5 TABLET ORAL at 03:01

## 2023-01-06 RX ADMIN — ONDANSETRON 4 MG: 4 TABLET, ORALLY DISINTEGRATING ORAL at 09:01

## 2023-01-06 RX ADMIN — DILTIAZEM HYDROCHLORIDE 120 MG: 120 CAPSULE, COATED, EXTENDED RELEASE ORAL at 09:01

## 2023-01-06 RX ADMIN — ZINC SULFATE 220 MG (50 MG) CAPSULE 220 MG: CAPSULE at 02:01

## 2023-01-06 RX ADMIN — THERA TABS 1 TABLET: TAB at 08:01

## 2023-01-06 RX ADMIN — INSULIN DETEMIR 5 UNITS: 100 INJECTION, SOLUTION SUBCUTANEOUS at 09:01

## 2023-01-06 RX ADMIN — PREDNISONE 20 MG: 20 TABLET ORAL at 04:01

## 2023-01-06 RX ADMIN — FAMOTIDINE 20 MG: 20 TABLET ORAL at 09:01

## 2023-01-06 RX ADMIN — HEPARIN SODIUM 5000 UNITS: 5000 INJECTION INTRAVENOUS; SUBCUTANEOUS at 06:01

## 2023-01-06 RX ADMIN — POTASSIUM CHLORIDE 40 MEQ: 1500 TABLET, EXTENDED RELEASE ORAL at 08:01

## 2023-01-06 RX ADMIN — SODIUM CHLORIDE 1000 MG: 1 TABLET ORAL at 09:01

## 2023-01-06 NOTE — PT/OT/SLP PROGRESS
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   Pt was agreeable to therapy and tolerated fairly well. Pt completed BLE therex/stretching and short gait distance. Pt continues to daria limited by decrease in BP with gait. Pt continues to benefit from therapy to improve functional endurance, strength, and mobility.       Performance deficits affecting function: weakness, impaired functional mobility, decreased safety awareness, impaired cardiopulmonary response to activity, impaired endurance, gait instability, pain, impaired balance, impaired self care skills, decreased lower extremity function.    Rehab Potential is good    Activity Tolerance: Good    Plan:     Patient to be seen 6 x/week to address the above listed problems via gait training, therapeutic activities, therapeutic exercises, neuromuscular re-education    Plan of Care Expires: 02/01/23  Plan of Care Reviewed with: patient    Subjective     "Can you stretch me".     Pain/Comfort:  Pain Rating 1: 9/10  Location - Side 1: Bilateral  Location - Orientation 1: generalized  Location 1: shoulder  Pain Addressed 1: Pre-medicate for activity, Reposition  Pain Rating Post-Intervention 1: 9/10  Pain Rating 2:  (did not rate)  Location - Side 2: Right  Location - Orientation 2: generalized  Location 2: knee  Pain Addressed 2: Pre-medicate for " activity  Pain Rating Post-Intervention 2: 6/10    Patient's cultural, spiritual, Scientologist conflicts given the current situation:  no    Objective:     Patient found up in chair with  (no active lines) upon PT entry to room.     Therapeutic Activities and Exercises:   Seated BLE therex x10 reps: heel/toe raises, LAQ, marching  x5 hamstring curl with theraball, hamstring/calf stretch  Patient educated on role of therapy, goals of session, and benefits of out of bed mobility.   Instructed on use of call button and importance of calling nursing staff for assistance with mobility   Questions/concerns addressed within PTA scope of practice  Pt verbalized understanding.    Functional Mobility:  Transfers:   Sit <> Stand Transfer: contact guard assistance with rolling walker   Bed <> Chair Transfer: contact guard assistance with no assistive device using Stand Pivot technique   Gait:  Pt ambulated ~30+34ft with contact guard assistance and rolling walker.  Distance limited by BLE weakness and lightheadedness, decrease symptoms with rest breaks.   Gait Deviation(s): occasional unsteady gait with narrow SHAHID, flexed posture and decrease gilberto, and decrease step length  Verbal/tactile cues for upright posture, pacing, and widen SHAHID  BP (MAP) taken:  Pre gait: 112/62 (82)  Post 1st gait: 114/64 (86)  Post 2nd gait: 107/64 (80)    AM-PAC 6 CLICK MOBILITY  18    Patient left  up in wheelchair in therapy gym  with  MARS present for handoff    GOALS:   Multidisciplinary Problems       Physical Therapy Goals          Problem: Physical Therapy    Goal Priority Disciplines Outcome Goal Variances Interventions   Physical Therapy Goal     PT, PT/OT Ongoing, Progressing     Description: Goals to be met by: 2023     Patient will increase functional independence with mobility by performin. Sit to stand transfer with Modified [place]  2. Bed to chair transfer with Modified [place] using No Assistive Device  3. Gait   x 150 feet with Modified [place] using rolling walker or cane.   4. Wheelchair propulsion x150 feet with Modified [place]   5. Stand for 10 minutes with Warren                         Time Tracking:     PT Received On: 01/06/23  PT Start Time: 1042  PT Stop Time: 1123  PT Total Time (min): 41 min    Billable Minutes: Gait Training 15, Therapeutic Activity 11, and Therapeutic Exercise 15    Treatment Type: Treatment  PT/PTA: PTA     PTA Visit Number: 2     01/06/2023

## 2023-01-06 NOTE — PROGRESS NOTES
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 male with PMH of DM2, CAD, HTN, and HLD who presents to [facility] for treatment of sepsis and debility. Patient presented to urgent care with complaints of scrotal pain, redness and fever. He was advised to present to ED. Patient presented to the ED the next day. In ED he reported, fevers and chills at home, nausea, decreased PO intake, and multiple syncopal episodes with +incontinence of stool. 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  Empiric initiated with cefepime, clindamycin, metronidazole, and vancomycin.  Urology consulted and he underwent emergent excision and debridement of necrotic tissue on 11/15. Repeat washout performed 11/17. Improved and stepped down from ICU 11/18.  Bedside debridement performed on 11/22.  ID consulted and antibiotics regimen changed to cefepime and Flagyl known.  Wound cultures with Anaerococcus and Prevotella species.  Renal function normalized.  Return of SVT and Cardiology consulted with start of metoprolol. Patient deemed medically stable. Transferred to [facility] on 12/1/22. He completed course of cefepime and flagyl for treatment of kami's gangrene. Discharged to OS on 12/31/22.  Today, he has multiple concerns. He would like to make sure he has follow ups scheduled with cardiology, infectious disease, and rheumatology. Also reporting occasional dizziness. He refused his lasix this morning. Reports poor po  intake and loss of 50 pounds over the past few months. Will hold lasix for now. He also is requesting to stop his prednisone, will discontinue. Reports having some sinus congestion and pressure in his ears. Currently on course of zyrtec.     Patient will be treated at [facility] with PT and OT to improve functional status and ability to perform ADLs.     Interval History  All of today's labs reviewed and are listed below. Slight hyponatremia with sodium 132.  Initiate order for salt tabs 1g bid. Elevated inflammatory markers, sed rate 78, CRP 19.7, CCP antibodies 511, rheumatoid factor 914.  Discussed resuming prednisone. Patient agreeable to resume prednisone. Discussed with patient's rheumatologist, recommended started prednisone 20mg x 7 days, then 10mg x 7 days, then going down to 5mg, order initiated. Reports having an area of drainage around his perineum. Assessed area on exam today, no redness or drainage noted. No fever or leukocytosis. Will initiate order for doxycycline 100mg bid as patient will be high risk for infection with the resumption of steroids. He is also requesting a workup for proteinuria due to his hypoalbuminemia. He is concerned he may be leaking protein. Discussed that we can start with a UA, order initiated.  Refusing therapy due to chronic pain.  24 hr vital sign ranges listed below.  Patient denies shortness of breath, abdominal discomfort, nausea, or vomiting.  Patient reports an adequate appetite.  Patient denies dysuria.  Patient reports having regular bowel movements.   Continuing to follow and treat all acute and chronic conditions.    Past Medical History: Patient has a past medical history of Arthritis, Atrial myxoma, CHF (congestive heart failure), Coronary atherosclerosis, Diabetes mellitus, type 2, Difficult intubation, Hepatitis B, Hyperlipidemia, Hypertension, Non-alcoholic fatty liver disease, Rheumatoid arthritis, Rheumatoid arthritis flare (07/12/2021), Stroke, and  Systolic heart failure.    Past Surgical History: Patient has a past surgical history that includes Pleura biopsy; Resection of atrial myxoma (2007); Lung lobectomy (Right, 2008); Coronary angiography (N/A, 3/10/2021); Coronary stent placement (03/10/2021); Incision and drainage of scrotum (N/A, 11/15/2022); and Incision and drainage of scrotum (N/A, 11/17/2022).    Social History: Patient reports that he has been smoking cigarettes. He has a 35.00 pack-year smoking history. He has never used smokeless tobacco. He reports that he does not currently use alcohol. He reports that he does not use drugs.    Family History:  family history includes Cancer in his sister; Diabetes type I in his father; Diabetes type II in his mother; Hodgkin's lymphoma in his mother; Kidney failure in his father.    Allergies: Patient is allergic to enbrel [etanercept], nsaids (non-steroidal anti-inflammatory drug), statins-hmg-coa reductase inhibitors, and pcn [penicillins].        Review of Systems   Constitutional:  Positive for weight loss. Negative for fever and malaise/fatigue.   HENT:  Positive for congestion. Negative for ear discharge, ear pain and hearing loss.    Respiratory:  Negative for cough and shortness of breath.    Cardiovascular:  Negative for chest pain and leg swelling.   Gastrointestinal:  Negative for nausea and vomiting.   Genitourinary:  Negative for dysuria and frequency.   Musculoskeletal:  Positive for joint pain (fingers). Negative for myalgias.   Skin:  Negative for itching and rash.   Neurological:  Positive for dizziness and weakness. Negative for headaches.   Psychiatric/Behavioral:  Negative for depression. The patient is not nervous/anxious.        24 hour Vital Sign Range   Temp:  [97.8 °F (36.6 °C)-98.1 °F (36.7 °C)]   Pulse:  []   Resp:  [17-18]   BP: (128-133)/(65-74)   SpO2:  [94 %-95 %]       Physical Exam  Constitutional:       Appearance: Normal appearance.   HENT:      Head: Normocephalic  and atraumatic.      Mouth/Throat:      Mouth: Mucous membranes are moist.      Pharynx: Oropharynx is clear.   Eyes:      Extraocular Movements: Extraocular movements intact.      Conjunctiva/sclera:      Right eye: Exudate present.      Pupils: Pupils are equal, round, and reactive to light.   Cardiovascular:      Rate and Rhythm: Normal rate and regular rhythm.      Heart sounds: No murmur heard.  Pulmonary:      Effort: Pulmonary effort is normal.   Abdominal:      General: Abdomen is flat.      Palpations: Abdomen is soft.   Musculoskeletal:      Right lower leg: No edema.      Left lower leg: No edema.   Skin:     Capillary Refill: Capillary refill takes less than 2 seconds.      Comments: Scrotal wound   Neurological:      General: No focal deficit present.      Mental Status: He is alert.   Psychiatric:         Mood and Affect: Mood normal.         Behavior: Behavior normal.           Incision/Site 11/17/22 1639 Left Scrotum lateral vertical (Active)   11/17/22 1639   Present Prior to Hospital Arrival?:    Side: Left   Location: Scrotum   Orientation: lateral   Incision Type: vertical   Closure Method:    Additional Comments:    Removal Indication and Assessment:    Wound Outcome:    Removal Indications:    Wound Image    01/03/23 0825   Incision WDL ex 01/03/23 0825   Dressing Appearance Open to air 01/03/23 0825   Drainage Amount None 01/03/23 0825   Drainage Characteristics/Odor No odor 01/03/23 0825   Appearance Pink;Moist;Epithelialization;Granulating 01/03/23 0825   Black (%), Wound Tissue Color 0 % 12/28/22 1010   Red (%), Wound Tissue Color 100 % 01/03/23 0825   Yellow (%), Wound Tissue Color 0 % 12/28/22 1010   Periwound Area Intact;Dry;Pink 01/03/23 0825   Wound Edges Open 01/03/23 0825   Wound Length (cm) 6 cm 01/03/23 0825   Wound Width (cm) 3.7 cm 01/03/23 0825   Wound Depth (cm) 0.2 cm 01/03/23 0825   Wound Volume (cm^3) 4.44 cm^3 01/03/23 0825   Wound Surface Area (cm^2) 22.2 cm^2 01/03/23  0825   Tunneling (depth (cm)/location) 0 01/03/23 0825   Undermining (depth (cm)/location) 0 01/03/23 0825   Care Cleansed with:;Antimicrobial agent 01/03/23 0825   Dressing Applied;Gauze, wet to moist;Island/border 01/03/23 0825   Packing other (see comment) 12/27/22 0030   Packing Inserted  0 01/03/23 0825   Packing Removed 0 01/03/23 0825   Periwound Care Absorptive dressing applied;Skin barrier film applied 01/03/23 0825   Compression Other (see comments) 12/27/22 0030   Off Loading Off loading shoe 12/12/22 2209   Dressing Change Due 01/03/23 01/03/23 0825   Number of days: 47       Full skin assessment completed by this NP 1/3/23      Labs:  Recent Labs   Lab 01/03/23  0440 01/06/23  0442   WBC 7.04 5.79   HGB 13.9* 13.4*   HCT 42.4 41.2    267       Recent Labs   Lab 01/03/23  0440 01/06/23  0442   * 132*   K 4.3 4.1   CL 96 98   CO2 27 26   BUN 14 11   CREATININE 1.0 0.9   * 288*   CALCIUM 9.6 9.4   MG 2.1 1.8   PHOS 4.4 4.2       Recent Labs   Lab 01/06/23  0442   ALKPHOS 90   ALT 20   AST 23   ALBUMIN 2.7*   PROT 6.4   BILITOT 0.4         Recent Labs     01/04/23  1614 01/04/23  2106 01/05/23  0714 01/05/23  1114 01/05/23  1616 01/05/23  2058 01/06/23  0652 01/06/23  1229   POCTGLUCOSE 231* 232* 199* 263* 161* 262* 287* 220*         Meds Scheduled:   aspirin  81 mg Oral Daily    cetirizine  10 mg Oral Daily    diclofenac sodium  4 g Topical (Top) BID    digoxin  125 mcg Oral Daily    diltiaZEM  120 mg Oral Q12H    EScitalopram oxalate  20 mg Oral QHS    ezetimibe  10 mg Oral Daily    famotidine  20 mg Oral BID    folic acid  1 mg Oral Daily    gentamicin  2 drop Left Eye TID    heparin (porcine)  5,000 Units Subcutaneous Q8H    insulin aspart U-100  14 Units Subcutaneous TIDWM    insulin detemir U-100  44 Units Subcutaneous Daily    insulin detemir U-100  5 Units Subcutaneous QHS    LIDOcaine  1 patch Transdermal Q24H    metoprolol tartrate  50 mg Oral Q12H    multivitamin  1 tablet  Oral Daily    oxyCODONE  10 mg Oral Q12H    potassium chloride  40 mEq Oral Daily    senna-docusate 8.6-50 mg  1 tablet Oral BID       PRN:   acetaminophen, albuterol-ipratropium, benzonatate, bisacodyL, calcium carbonate, dextrose 10%, dextrose 10%, glucagon (human recombinant), glucose, glucose, hydrOXYzine, insulin aspart U-100, melatonin, naloxone, ondansetron, oxyCODONE, promethazine      Assessment and Plan:  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 Moisten gauze with VASHE and wring out excess liquid. Apply to wound bed tucking into all wound tunnels and undermining so that all aspects of the wound bed are covered. Add additional to lightly fill the wound bed (not tightly pack). Cover with telfa island dressing cut in half- then  Abd pads/ mesh underwear. Change per BID or if dressing comes loose or saturated.  Wound care nurse following  1/6-Reports having an area of drainage around his perineum. Assessed area on exam today, no redness or drainage noted. No fever or leukocytosis. Will initiate order for doxycycline 100mg bid as patient will be high risk for infection with the resumption of steroids.    Rheumatoid Arthritis  1/6-Elevated inflammatory markers, sed rate 78, CRP 19.7, CCP antibodies 511, rheumatoid factor 914.  Discussed resuming prednisone. Patient agreeable to resume prednisone. Discussed with patient's rheumatologist, recommended started prednisone 20mg x 7 days, then 10mg x 7 days, then going down to 5mg, order initiated.     Hypoalbuminemia  1/6/23-requesting a workup for proteinuria due to his hypoalbuminemia. He is concerned he may be leaking protein. Discussed that we can start with a UA, order initiated.     Moderate Protein-Calorie Malnutrition  Related to (etiology):  Inability to meet physiological needs PO in the presence of sepsis      Signs and Symptoms (as evidenced by):  Energy Intake: <75% of estimated energy requirement for > one month     Muscle Mass Depletion: mild depletion of clavicle region and interosseous muscle , moderate loss to hands and thighs  Weight Loss: 18%x < one month   Regular diet  Commercial beverage(calories,protein) boost plus TID chocolate  Vitamin/mineral supplement therapy- MVI  Initiate order for zinc sulfate daily x14 days, ascorbic acid 500 mg x 30 days, and probiotic daily per RD recommendations    Hyponatremia, new, 1/6/23  Slight hyponatremia with sodium 132.  Initiate order for salt tabs 1g bid.    Type 2 Diabetes mellitus with hyperglycemia, with long-term current use of insulin  Continue insulin aspart 14 units tid with meals  Insulin detemir 44 units daily  Insulin detemir 5 units nightly  Accuchecks ac and hs  SSI  1/6-labile today, ranging 161-287    Coronary artery disease  Continue asa daily    Chronic diastolic heart failure  Continue digoxin 125mcg daily  Continue diltiazem 120mg bid  Continue metoprolol 50 mg bid  Holding lasix due to poor po intake/ intermittent dizziness    Hypokalemia  On Kcl 40mEq daily  May need to d/c now that we are hold lasix, monitor closely    Conjunctivitis  Continue gentamicin drops tid    Otitis media with effusion    Hyperlipidemia  Continue zetia 10mg daily    Dysphoric mood  Continue lexapro 20mg daily      Anticipate disposition:    Home with home health      Follow-up needed during SNF admission:       Follow-up needed after discharge from SNF:   - PCP within 1-2 weeks  - See appt scheduled below     Future Appointments   Date Time Provider Department Center   1/18/2023  3:00 PM Kevin Lares MD [facility] [id number] [facility]   1/18/2023  3:30 PM Lalitha Stroud RN, CDE [facility] [id number] [facility]   2/2/2023  3:00 PM Shemar Dempsey MD [facility] [id number] [facility]         I certify that SNF services are required to be given on an inpatient basis because Nithin Wagner needs for  skilled nursing care and/or skilled rehabilitation are required on a daily basis and such services can only practically be provided in a skilled nursing facility setting and are for an ongoing condition for which she received inpatient care in the hospital.       Extended Visit:   Total time spent: 85minutes  Non Face to Face Time: 51 minutes   Description of Time: counseling provided on clinical condition, therapies provided, plan of care, emotional support, coordinating patient care with other care team members, reviewing and interpreting labs and imaging, collaboration with physician, initiating new orders, chart review, and documentation. See interval hx.         Polina Webber NP  Department of Hospital Medicine   [facility]     DOS: 1/6/2023       Patient note was created using MModal Dictation.  Any errors in syntax or even information may not have been identified and edited on initial review prior to signing this note.

## 2023-01-06 NOTE — PLAN OF CARE
Continue regular diet, add boost plus BTD, follow glucose may need to change to boost glucose instead. Rcommend probiotic, Vit C x 30 days, zinc sulfate x 14 days, RD following  Goals: PO to meet 85% of EEN/EPN with ONS by next RD follow up  Nutrition Goal Status: progressing towards goal  Communication of RD Recs: reviewed with physician     Assessment and Plan  Moderate Protein-Calorie Malnutrition  Malnutrition in the context of Acute Illness/Injury     Related to (etiology):  Inability to meet physiological needs PO in the presence of sepsis     Signs and Symptoms (as evidenced by):  Energy Intake: <75% of estimated energy requirement for > one month     Muscle Mass Depletion: mild depletion of clavicle region and interosseous muscle , moderate loss to hands and thighs  Weight Loss: 18%x < one month      Interventions(treatment strategy):  General diet  Collaboration with other providers  Commercial beverage(calories,protein) boost plus TID chocolate  Vitamin/mineral supplement therapy- MVI  Vitamin supplement therapy- folic acid, recommend ascorbic acid x 30 days   Mineral  supplement therapy recommend zinc x 14 days  Complimentary medicine- recommend probiotic

## 2023-01-06 NOTE — PROGRESS NOTES
[facility] - Skilled Nursing  Adult Nutrition  Progress Note    SUMMARY   Recommendations  Continue regular diet, add boost plus BTD, follow glucose may need to change to boost glucose instead. Rcommend probiotic, Vit C x 30 days, zinc sulfate x 14 days, RD following  Goals: PO to meet 85% of EEN/EPN with ONS by next RD follow up  Nutrition Goal Status: progressing towards goal  Communication of RD Recs: reviewed with physician    Assessment and Plan  Moderate Protein-Calorie Malnutrition  Malnutrition in the context of Acute Illness/Injury     Related to (etiology):  Inability to meet physiological needs PO in the presence of sepsis     Signs and Symptoms (as evidenced by):  Energy Intake: <75% of estimated energy requirement for > one month     Muscle Mass Depletion: mild depletion of clavicle region and interosseous muscle , moderate loss to hands and thighs  Weight Loss: 18%x < one month      Interventions(treatment strategy):  General diet  Collaboration with other providers  Commercial beverage(calories,protein) boost plus TID chocolate  Vitamin/mineral supplement therapy- MVI  Vitamin supplement therapy- folic acid, recommend ascorbic acid x 30 days   Mineral  supplement therapy recommend zinc x 14 days  Complimentary medicine- recommend probiotic    Malnutrition Assessment 1/3     Skin (Micronutrient): dry, scaly (pt states this skin condition is related to RA)  Nails (Micronutrient): pallor (pt states this is normal for him)  Hair/Scalp (Micronutrient): dry, plucked easily  Eyes (Micronutrient): eyelid(s) pallor (red, infection present and already indentified)  Teeth (Micronutrient): broken dentition (missing back molars)  Neck/Chest (Micronutrient): muscle wasting  Musculoskeletal/Lower Extremities: muscle wasting       Weight Loss (Malnutrition): greater than 5% in 1 month (18% in 4 weeks 12/9-12/31/22)   Orbital Region (Subcutaneous Fat Loss): well nourished  Upper Arm Region (Subcutaneous Fat Loss):  "well nourished  Thoracic and Lumbar Region: well nourished   Taoist Region (Muscle Loss): moderate depletion  Clavicle and Acromion Bone Region (Muscle Loss): mild depletion  Dorsal Hand (Muscle Loss): mild depletion  Patellar Region (Muscle Loss): mild depletion  Anterior Thigh Region (Muscle Loss): moderate depletion   Edema (Fluid Accumulation): 0-->no edema present             Reason for Assessment    Reason For Assessment: RD follow-up  Diagnosis: infection/sepsis  Relevant Medical History: DM, CHF, HLD, HTN, RA, CVA, NAFLD, CAD, stent placement obesity,smoker  Interdisciplinary Rounds: attended  General Information Comments: Pateint ordering from menu, taking boost, has not started lunch as in pain from therapy session , discussed recs for NP to order, some outside food now. Explained moderate acute malnutrition . patient is monitoring his weight here  Nutrition Discharge Planning: Dc on heart healthy diabetic diet    Nutrition Risk Screen    Nutrition Risk Screen: large or nonhealing wound, burn or pressure injury, unintentional loss of 10 lbs or more in the past 2 months, reduced oral intake over the last month    Nutrition/Diet History    Patient Reported Diet/Restrictions/Preferences: diabetic diet  Spiritual, Cultural Beliefs, Presybeterian Practices, Values that Affect Care: no  Food Allergies: NKFA  Factors Affecting Nutritional Intake: decreased appetite, constipation, nausea/vomiting    Anthropometrics    Temp: 98.1 °F (36.7 °C)  Height Method: Stated  Height: 5' 11" (180.3 cm)  Height (inches): 71 in  Weight Method: Standard Scale  Weight: 94.8 kg (209 lb)  Weight (lb): 209 lb  Ideal Body Weight (IBW), Male: 172 lb  % Ideal Body Weight, Male (lb): 118.95 %  BMI (Calculated): 29.2  BMI Grade: 25 - 29.9 - overweight  Weight Loss: unintentional  Usual Body Weight (UBW), k.4 kg  Weight Change Amount:  (weight loss over 4 weeks)  % Usual Body Weight: 82.01  % Weight Change From Usual Weight: -18.17 %   "     Lab/Procedures/Meds    Pertinent Labs Reviewed: reviewed  Pertinent Labs Comments: glucose 258, albumin, 2.7, CRP 19.7, Na 132, Hg 13.4,  Pertinent Medications Reviewed: reviewed  Pertinent Medications Comments: ASA, Abx, folic acid, lasix, prednisone, KCl, MVI, famotidine, insulin,       Estimated/Assessed Needs    Weight Used For Calorie Calculations: 92.8 kg (204 lb 9.4 oz)  Energy Calorie Requirements (kcal): 2230  Energy Need Method: [place]-St Jeor (x 1.3 (PAL))  Protein Requirements: 101-117g  Weight Used For Protein Calculations: 78 kg (171 lb 15.3 oz) (1.3-1.5g/kg IBW 2/2 obesity)  Fluid Requirements (mL): 2230 or per MD  Estimated Fluid Requirement Method: RDA Method  RDA Method (mL): 2230  CHO Requirement: 279g      Nutrition Prescription Ordered    Current Diet Order: Regular  Nutrition Order Comments: PO %  Oral Nutrition Supplement: Boost plus TID    Evaluation of Received Nutrient/Fluid Intake    I/O: +694  Energy Calories Required: meeting needs  Protein Required: meeting needs  Fluid Required: meeting needs  Comments: LBM 1/5  Tolerance: tolerating  % Intake of Estimated Energy Needs: 75 - 100 %  % Meal Intake: 75 - 100 %    Nutrition Risk    Level of Risk/Frequency of Follow-up: low (one time per week)     Monitor and Evaluation    Food and Nutrient Intake: food and beverage intake  Food and Nutrient Adminstration: diet order  Knowledge/Beliefs/Attitudes: food and nutrition knowledge/skill  Physical Activity and Function: nutrition-related ADLs and IADLs  Anthropometric Measurements: weight change  Biochemical Data, Medical Tests and Procedures: electrolyte and renal panel, gastrointestinal profile, glucose/endocrine profile, inflammatory profile  Nutrition-Focused Physical Findings: overall appearance     Nutrition Follow-Up    RD Follow-up?: Yes

## 2023-01-06 NOTE — PT/OT/SLP PROGRESS
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  He presents with limitations in performance of self-care, functional mobility, and ADLs. Performance deficits affecting function are weakness, impaired endurance, impaired self care skills, impaired functional mobility, decreased safety awareness, pain, gait instability, impaired balance, impaired cardiopulmonary response to activity. Pt tolerated Tx without incident, however cessation of treatment session early secondary to pt reporting pain. Nursing aware. Blood pressure was monitored during standing. Pt is making progress but continues to require assist to perform self care tasks, functional mobility and functional transfers. Pt would continue to benefit from OT intervention to further functional (I)ce and safety.      Rehab Potential is good    Activity tolerance:  Fair    Plan:     Patient to be seen 5 x/week to address the above listed problems via self-care/home management, therapeutic activities, therapeutic exercises    Plan of Care Expires: 01/31/23  Plan of Care Reviewed with: patient    Subjective     Communicated with: Nurse prior to session.    Pain/Comfort:  Pain Rating 1: 8/10  Location - Side 1: Bilateral  Location - Orientation 1: generalized  Location 1:  (shoulders and hips)  Pain Addressed 1: Distraction, Reposition, Cessation of Activity  Pain Rating Post-Intervention 1: 8/10  Pain Rating 2:  6/10  Location - Side 2: Right  Location - Orientation 2: generalized  Location 2: knee  Pain Addressed 2: Cessation of Activity, Reposition, Distraction  Pain Rating Post-Intervention 2: 6/10    Patient's cultural, spiritual, Scientology conflicts given the current situation:  yes    Objective:     Patient found up in chair with  (no active lines) upon OT entry to room.    Bed Mobility:    Pt seated in W/C at onset of treatment session.     Functional Mobility/Transfers:  Patient completed Sit <> Stand Transfer with stand by assistance  with  rolling walker   Patient completed Chair <> Bed Transfer using Step Transfer technique with stand by assistance with no assistive device    [facility] 6 Click ADL: 21    OT Exercises: Pt performed UBE exercise for 4 minutes with Min resistance before cessation of activity secondary to pain and fatigue.  UE exercises performed to increase functional endurance and strength in order increase independence when performing self care tasks, functional ambulation, W/C propulsion, and functional standing activities .    Treatment & Education:  Pt participated in standing activity with sup and RW. Pt at raised counter to perform fine motor and visual scanning activity with focus on standing tolerance, functional reaching, dynamic standing bal, crossing midline, and to promote independence with homemaking and self care tasks. Pt tolerated standing for 3 min and 59 sec and 1 min and 32 sec with a break in between secondary to fatigue.    Pt educated on role of OT, safety while performing functional transfers, safety while performing self care tasks, and progress towards OT goals     Patient left sitting edge of bed with call button in reach    GOALS:   Multidisciplinary Problems       Occupational Therapy Goals          Problem: Occupational Therapy    Goal Priority Disciplines Outcome Interventions   Occupational Therapy Goal     OT, PT/OT Ongoing, Progressing    Description: Goals to be met  by: 1/30/23     Patient will increase functional independence with ADLs by performing:    LE Dressing with Modified [place].  Grooming while standing at sink with Supervision.  Toileting from toilet with Supervision for hygiene and clothing management.   Bathing from  shower chair/bench with Supervision.  Step transfer with Modified [place]  Upper extremity exercise program, with [place].   Caregiver will be educated on level of assistance required to safely perform self care and functional transfers.                         Time Tracking:     OT Date of Treatment: 01/06/23  OT Start Time: 1124    OT Stop Time: 1152  OT Total Time (min): 28 min    Billable Minutes:Therapeutic Activity 20  Therapeutic Exercise 8    1/6/2023

## 2023-01-07 LAB
POCT GLUCOSE: 175 MG/DL (ref 70–110)
POCT GLUCOSE: 252 MG/DL (ref 70–110)
POCT GLUCOSE: 300 MG/DL (ref 70–110)
POCT GLUCOSE: 381 MG/DL (ref 70–110)

## 2023-01-07 PROCEDURE — 97116 GAIT TRAINING THERAPY: CPT | Mod: CQ

## 2023-01-07 PROCEDURE — 63600175 PHARM REV CODE 636 W HCPCS: Performed by: NURSE PRACTITIONER

## 2023-01-07 PROCEDURE — 63600175 PHARM REV CODE 636 W HCPCS: Performed by: STUDENT IN AN ORGANIZED HEALTH CARE EDUCATION/TRAINING PROGRAM

## 2023-01-07 PROCEDURE — 25000003 PHARM REV CODE 250: Performed by: NURSE PRACTITIONER

## 2023-01-07 PROCEDURE — 11000004 HC SNF PRIVATE

## 2023-01-07 PROCEDURE — 97530 THERAPEUTIC ACTIVITIES: CPT | Mod: CQ

## 2023-01-07 PROCEDURE — 25000003 PHARM REV CODE 250: Performed by: STUDENT IN AN ORGANIZED HEALTH CARE EDUCATION/TRAINING PROGRAM

## 2023-01-07 RX ADMIN — INSULIN ASPART 14 UNITS: 100 INJECTION, SOLUTION INTRAVENOUS; SUBCUTANEOUS at 12:01

## 2023-01-07 RX ADMIN — OXYCODONE HYDROCHLORIDE 15 MG: 5 TABLET ORAL at 12:01

## 2023-01-07 RX ADMIN — POTASSIUM CHLORIDE 40 MEQ: 1500 TABLET, EXTENDED RELEASE ORAL at 08:01

## 2023-01-07 RX ADMIN — INSULIN ASPART 14 UNITS: 100 INJECTION, SOLUTION INTRAVENOUS; SUBCUTANEOUS at 08:01

## 2023-01-07 RX ADMIN — FAMOTIDINE 20 MG: 20 TABLET ORAL at 08:01

## 2023-01-07 RX ADMIN — DILTIAZEM HYDROCHLORIDE 120 MG: 120 CAPSULE, COATED, EXTENDED RELEASE ORAL at 08:01

## 2023-01-07 RX ADMIN — SENNOSIDES AND DOCUSATE SODIUM 1 TABLET: 50; 8.6 TABLET ORAL at 08:01

## 2023-01-07 RX ADMIN — OXYCODONE HYDROCHLORIDE 10 MG: 10 TABLET, FILM COATED, EXTENDED RELEASE ORAL at 10:01

## 2023-01-07 RX ADMIN — INSULIN ASPART 4 UNITS: 100 INJECTION, SOLUTION INTRAVENOUS; SUBCUTANEOUS at 04:01

## 2023-01-07 RX ADMIN — ZINC SULFATE 220 MG (50 MG) CAPSULE 220 MG: CAPSULE at 08:01

## 2023-01-07 RX ADMIN — OXYCODONE HYDROCHLORIDE 15 MG: 5 TABLET ORAL at 08:01

## 2023-01-07 RX ADMIN — ONDANSETRON 4 MG: 4 TABLET, ORALLY DISINTEGRATING ORAL at 06:01

## 2023-01-07 RX ADMIN — HEPARIN SODIUM 5000 UNITS: 5000 INJECTION INTRAVENOUS; SUBCUTANEOUS at 02:01

## 2023-01-07 RX ADMIN — DIGOXIN 125 MCG: 125 TABLET ORAL at 08:01

## 2023-01-07 RX ADMIN — DOXYCYCLINE HYCLATE 100 MG: 100 TABLET, COATED ORAL at 08:01

## 2023-01-07 RX ADMIN — OXYCODONE HYDROCHLORIDE 15 MG: 5 TABLET ORAL at 05:01

## 2023-01-07 RX ADMIN — THERA TABS 1 TABLET: TAB at 08:01

## 2023-01-07 RX ADMIN — METOPROLOL TARTRATE 50 MG: 50 TABLET, FILM COATED ORAL at 08:01

## 2023-01-07 RX ADMIN — OXYCODONE HYDROCHLORIDE 15 MG: 5 TABLET ORAL at 01:01

## 2023-01-07 RX ADMIN — FOLIC ACID 1 MG: 1 TABLET ORAL at 08:01

## 2023-01-07 RX ADMIN — DICLOFENAC 4 G: 10 GEL TOPICAL at 08:01

## 2023-01-07 RX ADMIN — PREDNISONE 20 MG: 20 TABLET ORAL at 08:01

## 2023-01-07 RX ADMIN — INSULIN ASPART 10 UNITS: 100 INJECTION, SOLUTION INTRAVENOUS; SUBCUTANEOUS at 12:01

## 2023-01-07 RX ADMIN — ASPIRIN 81 MG 81 MG: 81 TABLET ORAL at 08:01

## 2023-01-07 RX ADMIN — SODIUM CHLORIDE 1000 MG: 1 TABLET ORAL at 08:01

## 2023-01-07 RX ADMIN — HEPARIN SODIUM 5000 UNITS: 5000 INJECTION INTRAVENOUS; SUBCUTANEOUS at 05:01

## 2023-01-07 RX ADMIN — OXYCODONE HYDROCHLORIDE AND ACETAMINOPHEN 500 MG: 500 TABLET ORAL at 08:01

## 2023-01-07 RX ADMIN — HEPARIN SODIUM 5000 UNITS: 5000 INJECTION INTRAVENOUS; SUBCUTANEOUS at 10:01

## 2023-01-07 RX ADMIN — INSULIN ASPART 14 UNITS: 100 INJECTION, SOLUTION INTRAVENOUS; SUBCUTANEOUS at 04:01

## 2023-01-07 RX ADMIN — ESCITALOPRAM OXALATE 20 MG: 10 TABLET ORAL at 08:01

## 2023-01-07 RX ADMIN — INSULIN DETEMIR 5 UNITS: 100 INJECTION, SOLUTION SUBCUTANEOUS at 08:01

## 2023-01-07 RX ADMIN — INSULIN DETEMIR 44 UNITS: 100 INJECTION, SOLUTION SUBCUTANEOUS at 08:01

## 2023-01-07 RX ADMIN — OXYCODONE HYDROCHLORIDE 15 MG: 5 TABLET ORAL at 04:01

## 2023-01-07 RX ADMIN — Medication 1 CAPSULE: at 08:01

## 2023-01-07 NOTE — PT/OT/SLP PROGRESS
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 requiring light assistance and verbal cues for transfers and gait to prevent falls due to weakness, pain, and fatigue.   Pt remains motivated to participate in PT session and will cont to benefit from skilled PT intervention..    .      Performance deficits affecting function: weakness, impaired functional mobility, decreased safety awareness, impaired cardiopulmonary response to activity, impaired endurance, gait instability, pain, impaired balance, impaired self care skills, decreased lower extremity function.    Rehab Potential is good    Activity Tolerance: Good    Plan:     Patient to be seen 6 x/week to address the above listed problems via gait training, therapeutic activities, therapeutic exercises, neuromuscular re-education    Plan of Care Expires: 02/01/23  Plan of Care Reviewed with: patient    Subjective     "Great that you're here, I need to move".     Pain/Comfort:  Pain Rating 1:  (no rating provided)  Location - Side 1: Bilateral  Location - Orientation 1: generalized  Location 1:  (shoulder and surgical site)  Pain Addressed 1: Distraction, Cessation of Activity, Nurse notified  Pain Rating Post-Intervention 1: 9/10    Patient's cultural, spiritual, Presybeterian conflicts given the current situation:  no    Objective:     Patient found sitting edge of bed with  (no active lines) " upon PT entry to room.         Functional Mobility:  Transfers:     Sit to Stand:  stand by assistance with rolling walker  Bed to Chair: stand by assistance with  no AD  using  Step Transfer  Gait: RW CGA for balance and safety on unlevel surfaces on pavers/level surfaces on tile 98ft and then 121ft, tech following with chair  Wheelchair Propulsion:  Pt propelled Standard wheelchair x 150 feet on Level tile with  Bilateral upper extremity with Supervision or Set-up Assistance.     [facility] 6 CLICK MOBILITY  18    Patient left sitting edge of bed with call button in reach.    GOALS:   Multidisciplinary Problems       Physical Therapy Goals          Problem: Physical Therapy    Goal Priority Disciplines Outcome Goal Variances Interventions   Physical Therapy Goal     PT, PT/OT Ongoing, Progressing     Description: Goals to be met by: 2023     Patient will increase functional independence with mobility by performin. Sit to stand transfer with Modified [place]  2. Bed to chair transfer with Modified [place] using No Assistive Device  3. Gait  x 150 feet with Modified [place] using rolling walker or cane.   4. Wheelchair propulsion x150 feet with Modified [place]   5. Stand for 10 minutes with [place]                         Time Tracking:     PT Received On: 23  PT Start Time: 1357  PT Stop Time: 1438  PT Total Time (min): 41 min    Billable Minutes: Gait Training 26 and Therapeutic Activity 15    Treatment Type: Treatment  PT/PTA: PTA     PTA Visit Number: 3     2023

## 2023-01-08 LAB
GLUCOSE SERPL-MCNC: 300 MG/DL (ref 70–110)
POCT GLUCOSE: 178 MG/DL (ref 70–110)
POCT GLUCOSE: 200 MG/DL (ref 70–110)
POCT GLUCOSE: 202 MG/DL (ref 70–110)
POCT GLUCOSE: 216 MG/DL (ref 70–110)

## 2023-01-08 PROCEDURE — 63600175 PHARM REV CODE 636 W HCPCS: Performed by: NURSE PRACTITIONER

## 2023-01-08 PROCEDURE — 25000003 PHARM REV CODE 250: Performed by: STUDENT IN AN ORGANIZED HEALTH CARE EDUCATION/TRAINING PROGRAM

## 2023-01-08 PROCEDURE — 97535 SELF CARE MNGMENT TRAINING: CPT

## 2023-01-08 PROCEDURE — 97530 THERAPEUTIC ACTIVITIES: CPT

## 2023-01-08 PROCEDURE — 97110 THERAPEUTIC EXERCISES: CPT

## 2023-01-08 PROCEDURE — 25000003 PHARM REV CODE 250: Performed by: NURSE PRACTITIONER

## 2023-01-08 PROCEDURE — 63600175 PHARM REV CODE 636 W HCPCS: Performed by: STUDENT IN AN ORGANIZED HEALTH CARE EDUCATION/TRAINING PROGRAM

## 2023-01-08 PROCEDURE — 11000004 HC SNF PRIVATE

## 2023-01-08 RX ADMIN — FAMOTIDINE 20 MG: 20 TABLET ORAL at 09:01

## 2023-01-08 RX ADMIN — DICLOFENAC 4 G: 10 GEL TOPICAL at 09:01

## 2023-01-08 RX ADMIN — HEPARIN SODIUM 5000 UNITS: 5000 INJECTION INTRAVENOUS; SUBCUTANEOUS at 10:01

## 2023-01-08 RX ADMIN — FOLIC ACID 1 MG: 1 TABLET ORAL at 09:01

## 2023-01-08 RX ADMIN — OXYCODONE HYDROCHLORIDE 15 MG: 5 TABLET ORAL at 11:01

## 2023-01-08 RX ADMIN — OXYCODONE HYDROCHLORIDE 15 MG: 5 TABLET ORAL at 04:01

## 2023-01-08 RX ADMIN — THERA TABS 1 TABLET: TAB at 09:01

## 2023-01-08 RX ADMIN — OXYCODONE HYDROCHLORIDE 15 MG: 5 TABLET ORAL at 03:01

## 2023-01-08 RX ADMIN — INSULIN ASPART 14 UNITS: 100 INJECTION, SOLUTION INTRAVENOUS; SUBCUTANEOUS at 08:01

## 2023-01-08 RX ADMIN — OXYCODONE HYDROCHLORIDE AND ACETAMINOPHEN 500 MG: 500 TABLET ORAL at 09:01

## 2023-01-08 RX ADMIN — INSULIN ASPART 4 UNITS: 100 INJECTION, SOLUTION INTRAVENOUS; SUBCUTANEOUS at 05:01

## 2023-01-08 RX ADMIN — HYDROXYZINE HYDROCHLORIDE 25 MG: 25 TABLET, FILM COATED ORAL at 10:01

## 2023-01-08 RX ADMIN — HEPARIN SODIUM 5000 UNITS: 5000 INJECTION INTRAVENOUS; SUBCUTANEOUS at 06:01

## 2023-01-08 RX ADMIN — Medication 1 CAPSULE: at 09:01

## 2023-01-08 RX ADMIN — OXYCODONE HYDROCHLORIDE 15 MG: 5 TABLET ORAL at 12:01

## 2023-01-08 RX ADMIN — ESCITALOPRAM OXALATE 20 MG: 10 TABLET ORAL at 08:01

## 2023-01-08 RX ADMIN — LIDOCAINE 1 PATCH: 50 PATCH CUTANEOUS at 12:01

## 2023-01-08 RX ADMIN — INSULIN ASPART 14 UNITS: 100 INJECTION, SOLUTION INTRAVENOUS; SUBCUTANEOUS at 12:01

## 2023-01-08 RX ADMIN — OXYCODONE HYDROCHLORIDE 15 MG: 5 TABLET ORAL at 07:01

## 2023-01-08 RX ADMIN — DOXYCYCLINE HYCLATE 100 MG: 100 TABLET, COATED ORAL at 09:01

## 2023-01-08 RX ADMIN — SENNOSIDES AND DOCUSATE SODIUM 1 TABLET: 50; 8.6 TABLET ORAL at 09:01

## 2023-01-08 RX ADMIN — OXYCODONE HYDROCHLORIDE 10 MG: 10 TABLET, FILM COATED, EXTENDED RELEASE ORAL at 09:01

## 2023-01-08 RX ADMIN — SENNOSIDES AND DOCUSATE SODIUM 1 TABLET: 50; 8.6 TABLET ORAL at 08:01

## 2023-01-08 RX ADMIN — FAMOTIDINE 20 MG: 20 TABLET ORAL at 08:01

## 2023-01-08 RX ADMIN — ASPIRIN 81 MG 81 MG: 81 TABLET ORAL at 09:01

## 2023-01-08 RX ADMIN — HEPARIN SODIUM 5000 UNITS: 5000 INJECTION INTRAVENOUS; SUBCUTANEOUS at 02:01

## 2023-01-08 RX ADMIN — HYDROXYZINE HYDROCHLORIDE 25 MG: 25 TABLET, FILM COATED ORAL at 12:01

## 2023-01-08 RX ADMIN — DICLOFENAC 4 G: 10 GEL TOPICAL at 10:01

## 2023-01-08 RX ADMIN — ZINC SULFATE 220 MG (50 MG) CAPSULE 220 MG: CAPSULE at 09:01

## 2023-01-08 RX ADMIN — PREDNISONE 20 MG: 20 TABLET ORAL at 09:01

## 2023-01-08 RX ADMIN — POTASSIUM CHLORIDE 40 MEQ: 1500 TABLET, EXTENDED RELEASE ORAL at 09:01

## 2023-01-08 RX ADMIN — METOPROLOL TARTRATE 50 MG: 50 TABLET, FILM COATED ORAL at 09:01

## 2023-01-08 RX ADMIN — INSULIN ASPART 4 UNITS: 100 INJECTION, SOLUTION INTRAVENOUS; SUBCUTANEOUS at 12:01

## 2023-01-08 RX ADMIN — OXYCODONE HYDROCHLORIDE 10 MG: 10 TABLET, FILM COATED, EXTENDED RELEASE ORAL at 10:01

## 2023-01-08 RX ADMIN — INSULIN DETEMIR 5 UNITS: 100 INJECTION, SOLUTION SUBCUTANEOUS at 10:01

## 2023-01-08 RX ADMIN — OXYCODONE HYDROCHLORIDE 15 MG: 5 TABLET ORAL at 10:01

## 2023-01-08 RX ADMIN — SODIUM CHLORIDE 1000 MG: 1 TABLET ORAL at 09:01

## 2023-01-08 RX ADMIN — INSULIN ASPART 14 UNITS: 100 INJECTION, SOLUTION INTRAVENOUS; SUBCUTANEOUS at 05:01

## 2023-01-08 RX ADMIN — DILTIAZEM HYDROCHLORIDE 120 MG: 120 CAPSULE, COATED, EXTENDED RELEASE ORAL at 09:01

## 2023-01-08 RX ADMIN — DIGOXIN 125 MCG: 125 TABLET ORAL at 09:01

## 2023-01-08 RX ADMIN — INSULIN ASPART 3 UNITS: 100 INJECTION, SOLUTION INTRAVENOUS; SUBCUTANEOUS at 04:01

## 2023-01-08 RX ADMIN — INSULIN DETEMIR 44 UNITS: 100 INJECTION, SOLUTION SUBCUTANEOUS at 08:01

## 2023-01-08 RX ADMIN — METOPROLOL TARTRATE 50 MG: 50 TABLET, FILM COATED ORAL at 08:01

## 2023-01-08 RX ADMIN — DILTIAZEM HYDROCHLORIDE 120 MG: 120 CAPSULE, COATED, EXTENDED RELEASE ORAL at 08:01

## 2023-01-08 RX ADMIN — EZETIMIBE 10 MG: 10 TABLET ORAL at 09:01

## 2023-01-08 NOTE — PLAN OF CARE
Problem: Occupational Therapy  Goal: Occupational Therapy Goal  Description: Goals to be met by: 1/30/23     Patient will increase functional independence with ADLs by performing:    LE Dressing with Modified [place].  Grooming while standing at sink with Supervision.  Toileting from toilet with Supervision for hygiene and clothing management.   Bathing from  shower chair/bench with Supervision.  Step transfer with Modified [place]  Upper extremity exercise program, with [place].   Caregiver will be educated on level of assistance required to safely perform self care and functional transfers.    1/8/2023 1157 by Krista Ayala OT  Outcome: Ongoing, Progressing     Pt was agreeable to OT and was noted to make progress towards his goals in therapy.  During today's session, pt c/o pain in L shoulder due to RA.  UE exercises and modalities (ice pack) used to assist with ROM and pain.  Pt also worked on ADLs and func mobility during session, however session ended due to significant decline in BP (92/55).   Pt's goals remain appropriate at this time.  He will continue to benefit from skilled OT services in order to assist him with increasing his safety and level of independence with self care and mobility tasks.     Krista Ayala OT  1/8/2023

## 2023-01-08 NOTE — PT/OT/SLP PROGRESS
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  He presents with the following performance deficits affecting function: weakness, impaired endurance, impaired sensation, impaired self care skills, impaired functional mobility, gait instability, impaired balance, decreased coordination, decreased upper extremity function, decreased safety awareness, pain, decreased ROM, impaired cardiopulmonary response to activity. Pt was agreeable to OT and was noted to make progress towards his goals in therapy.  During today's session, pt c/o pain in L shoulder due to RA.  UE exercises and modalities (ice pack) used to assist with ROM and pain.  Pt also worked on ADLs and func mobility during session, however session ended due to significant decline in BP (92/55).   Pt's goals remain appropriate at this time.  He will continue to benefit from skilled OT services in order to assist him with increasing his safety and level of independence with self care and mobility tasks.     Rehab Potential is good    Activity tolerance:  Fair    Plan:     Patient to be seen 5 x/week to address the above listed problems via self-care/home management, therapeutic activities, therapeutic exercises    Plan of Care Expires: 01/31/23  Plan of Care Reviewed with: patient    Subjective     Communicated with: nurse prior to session.   "I feel like I'm falling asleep"  - pt report during UE exercises " - OT retrieved BP machine and noted that BP was low (92/55) - brought back to room and nurse notified .    Pain/Comfort:  Pain Rating 1: 7/10  Location - Side 1: Left  Location 1: shoulder  Pain Addressed 1: Distraction, Cessation of Activity, Nurse notified, Other (see comments) (ice pack)  Pain Rating Post-Intervention 1: 7/10    Patient's cultural, spiritual, Protestant conflicts given the current situation:  yes    Objective:     Patient found HOB elevated with  (no active lines) upon OT entry to room.    Bed Mobility:    Patient completed Scooting/Bridging with modified independence  Patient completed Supine to Sit with min A to lift trunk using sheet to pull up on (tied to foot of bed) - needed Min A to initiate lifting trunk  Patient completed Sit to Supine with stand by assistance     Functional Mobility/Transfers:  Patient completed Sit <> Stand Transfer with stand by assistance  with  rolling walker   Patient completed Bed <> Chair Transfer using Step Transfer technique with stand by assistance with rolling walker  Functional Mobility: pt propelled w/c from room to gym using R UE and B LEs (did not use L UE due to pain in shoulder)    Activities of Daily Living:  Feeding:  modified independence    Grooming: modified independence at w/c level at sink  Lower Body Dressing: modified independence to doff pants when supine in bed    [facility] 6 Click ADL: 21    OT Exercises: AROM - pt worked on AROM of L UE and strengthening of R UE  for improved UE func/strength/endurance for daily skills and mobility  Pt used 2# hand weight on exercises on R UE- shoulder flex/ext, elbow flex/ext, and forearm sup/pro (except for shoulder flexion - only used weight for 4 reps out of tem)  AAROM performed on L shoulder with cold compress on shoulder joint - able to range to full limit with assistance  L UE elbow exercises performed actively without weight (2# weight attempted) and forearm exercises performed with 2#  weight    Treatment & Education:  Pt completed ADLs and func mobility activities for tx session as noted above  Pt with low BP during session with symptoms of fatigue / falling asleep (92/55) - returned to room in bed and BP raised to 119/72  Whiteboard updated  Pt educated on role of OT and POC      Patient left HOB elevated with call button in reach    GOALS:   Multidisciplinary Problems       Occupational Therapy Goals          Problem: Occupational Therapy    Goal Priority Disciplines Outcome Interventions   Occupational Therapy Goal     OT, PT/OT Ongoing, Progressing    Description: Goals to be met by: 1/30/23     Patient will increase functional independence with ADLs by performing:    LE Dressing with Modified [place].  Grooming while standing at sink with Supervision.  Toileting from toilet with Supervision for hygiene and clothing management.   Bathing from  shower chair/bench with Supervision.  Step transfer with Modified [place]  Upper extremity exercise program, with [place].   Caregiver will be educated on level of assistance required to safely perform self care and functional transfers.                         Time Tracking:     OT Date of Treatment: 01/08/23  OT Start Time: 1036    OT Stop Time: 1116  OT Total Time (min): 40 min    Billable Minutes:Self Care/Home Management 20  Therapeutic Activity 10  Therapeutic Exercise 10    1/8/2023

## 2023-01-09 ENCOUNTER — PATIENT MESSAGE (OUTPATIENT)
Dept: UROLOGY | Facility: CLINIC | Age: 68
End: 2023-01-09
Payer: MEDICARE

## 2023-01-09 LAB
POCT GLUCOSE: 159 MG/DL (ref 70–110)
POCT GLUCOSE: 162 MG/DL (ref 70–110)
POCT GLUCOSE: 167 MG/DL (ref 70–110)
POCT GLUCOSE: 186 MG/DL (ref 70–110)

## 2023-01-09 PROCEDURE — 25000003 PHARM REV CODE 250: Performed by: NURSE PRACTITIONER

## 2023-01-09 PROCEDURE — 97530 THERAPEUTIC ACTIVITIES: CPT

## 2023-01-09 PROCEDURE — 63600175 PHARM REV CODE 636 W HCPCS: Performed by: STUDENT IN AN ORGANIZED HEALTH CARE EDUCATION/TRAINING PROGRAM

## 2023-01-09 PROCEDURE — 97535 SELF CARE MNGMENT TRAINING: CPT | Mod: CO

## 2023-01-09 PROCEDURE — 63600175 PHARM REV CODE 636 W HCPCS: Performed by: NURSE PRACTITIONER

## 2023-01-09 PROCEDURE — 97116 GAIT TRAINING THERAPY: CPT

## 2023-01-09 PROCEDURE — 25000003 PHARM REV CODE 250: Performed by: STUDENT IN AN ORGANIZED HEALTH CARE EDUCATION/TRAINING PROGRAM

## 2023-01-09 PROCEDURE — 97110 THERAPEUTIC EXERCISES: CPT

## 2023-01-09 PROCEDURE — 11000004 HC SNF PRIVATE

## 2023-01-09 RX ORDER — FLUTICASONE PROPIONATE 50 MCG
2 SPRAY, SUSPENSION (ML) NASAL DAILY
Status: DISCONTINUED | OUTPATIENT
Start: 2023-01-10 | End: 2023-01-19 | Stop reason: HOSPADM

## 2023-01-09 RX ORDER — ERYTHROMYCIN 5 MG/G
OINTMENT OPHTHALMIC NIGHTLY
Status: DISCONTINUED | OUTPATIENT
Start: 2023-01-09 | End: 2023-01-19 | Stop reason: HOSPADM

## 2023-01-09 RX ADMIN — ASPIRIN 81 MG 81 MG: 81 TABLET ORAL at 08:01

## 2023-01-09 RX ADMIN — DOXYCYCLINE HYCLATE 100 MG: 100 TABLET, COATED ORAL at 08:01

## 2023-01-09 RX ADMIN — OXYCODONE HYDROCHLORIDE 15 MG: 5 TABLET ORAL at 10:01

## 2023-01-09 RX ADMIN — DILTIAZEM HYDROCHLORIDE 120 MG: 120 CAPSULE, COATED, EXTENDED RELEASE ORAL at 08:01

## 2023-01-09 RX ADMIN — OXYCODONE HYDROCHLORIDE 10 MG: 10 TABLET, FILM COATED, EXTENDED RELEASE ORAL at 08:01

## 2023-01-09 RX ADMIN — METOPROLOL TARTRATE 50 MG: 50 TABLET, FILM COATED ORAL at 08:01

## 2023-01-09 RX ADMIN — FAMOTIDINE 20 MG: 20 TABLET ORAL at 08:01

## 2023-01-09 RX ADMIN — OXYCODONE HYDROCHLORIDE AND ACETAMINOPHEN 500 MG: 500 TABLET ORAL at 08:01

## 2023-01-09 RX ADMIN — ZINC SULFATE 220 MG (50 MG) CAPSULE 220 MG: CAPSULE at 08:01

## 2023-01-09 RX ADMIN — POTASSIUM CHLORIDE 40 MEQ: 1500 TABLET, EXTENDED RELEASE ORAL at 08:01

## 2023-01-09 RX ADMIN — FOLIC ACID 1 MG: 1 TABLET ORAL at 08:01

## 2023-01-09 RX ADMIN — OXYCODONE HYDROCHLORIDE 15 MG: 5 TABLET ORAL at 03:01

## 2023-01-09 RX ADMIN — INSULIN ASPART 2 UNITS: 100 INJECTION, SOLUTION INTRAVENOUS; SUBCUTANEOUS at 08:01

## 2023-01-09 RX ADMIN — HEPARIN SODIUM 5000 UNITS: 5000 INJECTION INTRAVENOUS; SUBCUTANEOUS at 10:01

## 2023-01-09 RX ADMIN — OXYCODONE HYDROCHLORIDE 15 MG: 5 TABLET ORAL at 11:01

## 2023-01-09 RX ADMIN — HEPARIN SODIUM 5000 UNITS: 5000 INJECTION INTRAVENOUS; SUBCUTANEOUS at 01:01

## 2023-01-09 RX ADMIN — OXYCODONE HYDROCHLORIDE 10 MG: 10 TABLET, FILM COATED, EXTENDED RELEASE ORAL at 10:01

## 2023-01-09 RX ADMIN — INSULIN DETEMIR 44 UNITS: 100 INJECTION, SOLUTION SUBCUTANEOUS at 08:01

## 2023-01-09 RX ADMIN — HEPARIN SODIUM 5000 UNITS: 5000 INJECTION INTRAVENOUS; SUBCUTANEOUS at 05:01

## 2023-01-09 RX ADMIN — INSULIN ASPART 2 UNITS: 100 INJECTION, SOLUTION INTRAVENOUS; SUBCUTANEOUS at 05:01

## 2023-01-09 RX ADMIN — THERA TABS 1 TABLET: TAB at 08:01

## 2023-01-09 RX ADMIN — DICLOFENAC 4 G: 10 GEL TOPICAL at 09:01

## 2023-01-09 RX ADMIN — INSULIN ASPART 14 UNITS: 100 INJECTION, SOLUTION INTRAVENOUS; SUBCUTANEOUS at 12:01

## 2023-01-09 RX ADMIN — ESCITALOPRAM OXALATE 20 MG: 10 TABLET ORAL at 08:01

## 2023-01-09 RX ADMIN — OXYCODONE HYDROCHLORIDE 15 MG: 5 TABLET ORAL at 07:01

## 2023-01-09 RX ADMIN — EZETIMIBE 10 MG: 10 TABLET ORAL at 08:01

## 2023-01-09 RX ADMIN — DIGOXIN 125 MCG: 125 TABLET ORAL at 08:01

## 2023-01-09 RX ADMIN — INSULIN ASPART 14 UNITS: 100 INJECTION, SOLUTION INTRAVENOUS; SUBCUTANEOUS at 05:01

## 2023-01-09 RX ADMIN — HYDROXYZINE HYDROCHLORIDE 25 MG: 25 TABLET, FILM COATED ORAL at 08:01

## 2023-01-09 RX ADMIN — HYDROXYZINE HYDROCHLORIDE 25 MG: 25 TABLET, FILM COATED ORAL at 10:01

## 2023-01-09 RX ADMIN — SODIUM CHLORIDE 1000 MG: 1 TABLET ORAL at 08:01

## 2023-01-09 RX ADMIN — Medication 1 CAPSULE: at 08:01

## 2023-01-09 RX ADMIN — METOPROLOL TARTRATE 50 MG: 50 TABLET, FILM COATED ORAL at 09:01

## 2023-01-09 RX ADMIN — DICLOFENAC 4 G: 10 GEL TOPICAL at 08:01

## 2023-01-09 RX ADMIN — INSULIN ASPART 2 UNITS: 100 INJECTION, SOLUTION INTRAVENOUS; SUBCUTANEOUS at 12:01

## 2023-01-09 RX ADMIN — INSULIN DETEMIR 5 UNITS: 100 INJECTION, SOLUTION SUBCUTANEOUS at 08:01

## 2023-01-09 RX ADMIN — INSULIN ASPART 14 UNITS: 100 INJECTION, SOLUTION INTRAVENOUS; SUBCUTANEOUS at 08:01

## 2023-01-09 RX ADMIN — PREDNISONE 20 MG: 20 TABLET ORAL at 08:01

## 2023-01-09 RX ADMIN — LIDOCAINE 1 PATCH: 50 PATCH CUTANEOUS at 12:01

## 2023-01-09 NOTE — PT/OT/SLP PROGRESS
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  He presents with limitations in performance of self-care, functional mobility, and ADLs. Performance deficits affecting function are weakness, impaired endurance, impaired sensation, impaired self care skills, impaired functional mobility, gait instability, impaired balance, decreased coordination, decreased upper extremity function, decreased safety awareness, pain, decreased ROM, impaired cardiopulmonary response to activity. Pt tolerated Tx without incident, however declining coming to therapy gym to perform any therapeutic exercise. Pt is making progress but continues to require assist to perform self care tasks, functional mobility and functional transfers. Pt would continue to benefit from OT intervention to further functional (I)ce and safety.    Rehab Potential is good    Activity tolerance:  Fair    Plan:     Patient to be seen 5 x/week to address the above listed problems via self-care/home management, therapeutic activities, therapeutic exercises    Plan of Care Expires: 01/31/23  Plan of Care Reviewed with: patient    Subjective     Communicated with: Nurse prior to session.    Pain/Comfort:  Pain Rating 1: 4/10  Location - Side 1: Bilateral  Location - Orientation 1: generalized  Location 1: shoulder  Pain Addressed 1: Distraction, Pre-medicate for activity  Pain Rating  Post-Intervention 1: 4/10    Patient's cultural, spiritual, Spiritism conflicts given the current situation:  yes    Objective:     Patient found supine with  (no active lines) upon OT entry to room.    Bed Mobility:    Patient completed Scooting/Bridging with supervision  Patient completed Supine to Sit with supervision and with side rail     Functional Mobility/Transfers:  Patient completed Sit <> Stand Transfer with supervision  with  no assistive device and rolling walker   Patient completed Bed <> Chair Transfer using Stand Pivot technique with supervision with no assistive device x1 and rolling walker x1  Patient completed Toilet Transfer Step Transfer technique with stand by assistance with  no AD    Activities of Daily Living:  Grooming: supervision standing sinkside with set up for oral hygiene and shaving with brief break in between  Upper Body Dressing: modified independence seated with set up for pullover shirt  Lower Body Dressing: supervision seated EOB to noah shoes and thread pants with standing with no AD to manage pants  Toileting: supervision with simulating toileting on toilet for trial 1 and trial 2 standing with urinal with no AD    [facility] 6 Click ADL: 21    Treatment & Education:  Pt educated on role of OT, safety while performing functional transfers, safety while performing self care tasks, and progress towards OT goals    Patient left sitting edge of bed with call button in reach    GOALS:   Multidisciplinary Problems       Occupational Therapy Goals          Problem: Occupational Therapy    Goal Priority Disciplines Outcome Interventions   Occupational Therapy Goal     OT, PT/OT Ongoing, Progressing    Description: Goals to be met by: 1/30/23     Patient will increase functional independence with ADLs by performing:    LE Dressing with Modified [place].  Grooming while standing at sink with Supervision.  Toileting from toilet with Supervision for hygiene and clothing management.    Bathing from  shower chair/bench with Supervision.  Step transfer with Modified [place]  Upper extremity exercise program, with [place].   Caregiver will be educated on level of assistance required to safely perform self care and functional transfers.                         Time Tracking:     OT Date of Treatment: 01/09/23  OT Start Time: 0925    OT Stop Time: 0952  OT Total Time (min): 27 min    Billable Minutes:Self Care/Home Management 27    1/9/2023

## 2023-01-09 NOTE — PROGRESS NOTES
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 male with PMH of DM2, CAD, HTN, and HLD who presents to [facility] for treatment of sepsis and debility. Patient presented to urgent care with complaints of scrotal pain, redness and fever. He was advised to present to ED. Patient presented to the ED the next day. In ED he reported, fevers and chills at home, nausea, decreased PO intake, and multiple syncopal episodes with +incontinence of stool. 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  Empiric initiated with cefepime, clindamycin, metronidazole, and vancomycin.  Urology consulted and he underwent emergent excision and debridement of necrotic tissue on 11/15. Repeat washout performed 11/17. Improved and stepped down from ICU 11/18.  Bedside debridement performed on 11/22.  ID consulted and antibiotics regimen changed to cefepime and Flagyl known.  Wound cultures with Anaerococcus and Prevotella species.  Renal function normalized.  Return of SVT and Cardiology consulted with start of metoprolol. Patient deemed medically stable. Transferred to [facility] on 12/1/22. He completed course of cefepime and flagyl for treatment of kami's gangrene. Discharged to OS on 12/31/22.  Today, he has multiple concerns. He would like to make sure he has follow ups scheduled with cardiology, infectious disease, and rheumatology. Also reporting occasional dizziness. He refused his lasix this morning. Reports poor po intake and loss of 50 pounds over the past few months. Will hold lasix for  now. He also is requesting to stop his prednisone, will discontinue. Reports having some sinus congestion and pressure in his ears. Currently on course of zyrtec.      Interval history:  24 hr vital sign ranges listed below.  Patient reported recent left eye conjunctivitis that had resolved but now returned.  Left eye does appear red especially on the bottom lip.  Patient states he feels he has a pimple like protrusions from his groin, scoring was inspected and patient was feeling skin tags.  Patient has minimal drainage from his groin area that is a white milky green but overall improving in appearance.  No odor.  Patient denies shortness of breath, abdominal discomfort, nausea, or vomiting.  Patient reports an adequate appetite.  Patient denies dysuria.  Patient reports having regular bowel movements.  Patient progessing with PT/OT. Continuing to follow and treat all acute and chronic conditions.    Past Medical History: Patient has a past medical history of Arthritis, Atrial myxoma, CHF (congestive heart failure), Coronary atherosclerosis, Diabetes mellitus, type 2, Difficult intubation, Hepatitis B, Hyperlipidemia, Hypertension, Non-alcoholic fatty liver disease, Rheumatoid arthritis, Rheumatoid arthritis flare (07/12/2021), Stroke, and Systolic heart failure.    Past Surgical History: Patient has a past surgical history that includes Pleura biopsy; Resection of atrial myxoma (2007); Lung lobectomy (Right, 2008); Coronary angiography (N/A, 3/10/2021); Coronary stent placement (03/10/2021); Incision and drainage of scrotum (N/A, 11/15/2022); and Incision and drainage of scrotum (N/A, 11/17/2022).    Social History: Patient reports that he has been smoking cigarettes. He has a 35.00 pack-year smoking history. He has never used smokeless tobacco. He reports that he does not currently use alcohol. He reports that he does not use drugs.    Family History: family history includes Cancer in his sister; Diabetes type I  in his father; Diabetes type II in his mother; Hodgkin's lymphoma in his mother; Kidney failure in his father.    Allergies: Patient is allergic to enbrel [etanercept], nsaids (non-steroidal anti-inflammatory drug), statins-hmg-coa reductase inhibitors, and pcn [penicillins].    ROS  Constitutional: Negative for fever   Eyes: Negative for blurred vision, double vision +left eye conjunctivitis   Respiratory: Negative for cough, shortness of breath.  +nasal congestion   Cardiovascular: Negative for chest pain, palpitations, and leg swelling.   Gastrointestinal: Negative for abdominal pain, constipation, diarrhea, nausea, vomiting.   Genitourinary: Negative for dysuria, frequency   Musculoskeletal:  + generalized weakness. Negative for back pain and myalgias.   Skin: Negative for itching and rash.   Neurological: Negative for dizziness, headaches.   Psychiatric/Behavioral: Negative for depression. The patient is not nervous/anxious.      24 hour Vital Sign Range   Temp:  [97.4 °F (36.3 °C)-98.8 °F (37.1 °C)]   Pulse:  [78-88]   Resp:  [18]   BP: (144-145)/(73-74)   SpO2:  [94 %-95 %]     Current BMI: Body mass index is 28.53 kg/m².    PEx  Constitutional: Patient appears debilitated.  No distress noted  HENT:   Head: Normocephalic and atraumatic.   Eyes: Pupils are equal, round  Neck: Normal range of motion. Neck supple.   Cardiovascular: Normal rate, regular rhythm and normal heart sounds.    Pulmonary/Chest: Effort normal and breath sounds are clear  Abdominal: Soft. Bowel sounds are normal.   Musculoskeletal: Normal range of motion.   Neurological: Alert and oriented to person, place, and time.   Psychiatric: Normal mood and affect. Behavior is normal.   Skin: Skin is warm and dry.  Scrotal wound             Incision/Site 11/17/22 1639 Left Scrotum lateral vertical   Date First Assessed/Time First Assessed: 11/17/22 1639   Side: Left  Location: Scrotum  Orientation: lateral  Incision Type: vertical   Incision WDL ex    Dressing Appearance Open to air   Drainage Amount None   Drainage Characteristics/Odor No odor   Appearance Pink;Moist;Epithelialization;Granulating   Red (%), Wound Tissue Color 100 %   Periwound Area Intact;Dry;Pink   Wound Edges Open   Wound Length (cm) 6 cm   Wound Width (cm) 3.7 cm   Wound Depth (cm) 0.2 cm   Wound Volume (cm^3) 4.44 cm^3   Wound Surface Area (cm^2) 22.2 cm^2   Tunneling (depth (cm)/location) 0   Undermining (depth (cm)/location) 0       No results for input(s): GLUCOSE, NA, K, CL, CO2, BUN, CREATININE, MG in the last 24 hours.    Invalid input(s):  CALCIUM    No results for input(s): WBC, RBC, HGB, HCT, PLT, MCV, MCH, MCHC in the last 24 hours.      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 Moisten gauze with VASHE and wring out excess liquid. Apply to wound bed tucking into all wound tunnels and undermining so that all aspects of the wound bed are covered. Add additional to lightly fill the wound bed (not tightly pack). Cover with telfa island dressing cut in half- then  Abd pads/ mesh underwear. Change per BID or if dressing comes loose or saturated.  Wound care nurse following  1/6-Reports having an area of drainage around his perineum. Assessed area on exam today, no redness or drainage noted. No fever or leukocytosis. Will initiate order for doxycycline 100mg bid as patient will be high risk for infection with the resumption of steroids.- end date 5 days     Rheumatoid Arthritis  1/6-Elevated inflammatory markers, sed rate 78, CRP 19.7, CCP antibodies 511, rheumatoid factor 914.  Discussed resuming prednisone. Patient agreeable to resume prednisone. Discussed with patient's  rheumatologist, recommended started prednisone 20mg x 7 days, then 10mg x 7 days, then going down to 5mg    Moderate Protein-Calorie Malnutrition  Related to (etiology):  Inability to meet physiological needs PO in the presence of sepsis     Signs and Symptoms (as evidenced by):  Energy Intake: <75% of estimated energy requirement for > one month     Muscle Mass Depletion: mild depletion of clavicle region and interosseous muscle , moderate loss to hands and thighs  Weight Loss: 18%x < one month   Regular diet  Commercial beverage(calories,protein) boost plus TID chocolate  Vitamin/mineral supplement therapy- MVI  Initiate order for zinc sulfate daily x14 days, ascorbic acid 500 mg x 30 days, and probiotic daily per RD recommendations          Type 2 Diabetes mellitus with hyperglycemia, with long-term current use of insulin  Continue insulin aspart 14 units tid with meals  Insulin detemir 44 units daily  Insulin detemir 5 units nightly  Accuchecks ac and hs  SSI  1/9 stable 24 hour EEG is 157-186     Coronary artery disease  Continue asa daily     Chronic diastolic heart failure  Continue digoxin 125mcg daily  Continue diltiazem 120mg bid  Continue metoprolol 50 mg bid  Holding lasix due to poor po intake/ intermittent dizziness     Hypokalemia  On Kcl 40mEq daily  May need to d/c now that we are hold lasix, monitor closely     Conjunctivitis  Continue gentamicin drops tid     Otitis media with effusion     Hyperlipidemia  Continue zetia 10mg daily     Dysphoric mood  Continue lexapro 20mg daily        Anticipate disposition:    Home with home health          Anticipate disposition:  Home with home health      Follow-up needed during SNF stay-    Follow-up needed after discharge from SNF: PCP,     Future Appointments   Date Time Provider Department Center   1/18/2023  3:00 PM Kevin Lares MD [facility] [id number] [facility]   1/18/2023  3:30 PM Lalitha Stroud RN, CDE [facility] [id number] [facility]   2/2/2023  3:00 PM Shemar YOUNGER  MD [facility] [id number] [facility] Anamika Veliz NP  Department of Hospital Medicine   [facility]     DOS: 1/9/2023       Patient note was created using MModal Dictation.  Any errors in syntax or even information may not have been identified and edited on initial review prior to signing this note.

## 2023-01-09 NOTE — PLAN OF CARE
Interdisciplinary team, Tiffanie Garcia, RN Nurse Manager, Lucretia Light, RN Charge Nurse, Vicki Baker, PT, Rehab Supervisor, Marquise Santhosh LMSW, Festus Sherman, and Danelle Kim, Dietician, spoke to patient for care plan conference, weekly status update, and therapy progress update. Tentative discharge date set for 1/24/23.

## 2023-01-09 NOTE — TREATMENT PLAN
"Rehab Services' DME recommendations    Nithin Wagner  MRN: [id number]      [x] Walker Adult (5'1"-6"6")    Wheels Yes       [x] Wheelchair  Number of hours up in a wheelchair per day 2 plus       Style Light weight        Justification for light weight w/c: patient cannot propel in a standard wheelchair, patient can and does self-propel in a lightweight wheelchair, patient has impaired ability to participate in MRADLs, mobility limitations cannot be sifficiently resolved with a cane/walker, the home provides adequate access between rooms for a wheelchair, a wheelchair will significantly improve the ability to participate in MRADLs and will be used in the home on a regular basis, and the patient is willing to use a wheelchair in the home    Seat Width 18 (Standard adult)    Seat Depth 18    Back Height Standard    Leg Support Standard and Swing Away    Arm Height Full and Swing Away    Lap Belt Buckle    Cushion Basic    Justification for Cushion to prevent breakdown      [x] Tub bench Standard (unpadded)     [x] Home health PT and OT      JAHAIRA Zheng 1/9/2023      "

## 2023-01-09 NOTE — PT/OT/SLP PROGRESS
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 Patient agreeable to PT treatment this AM. Vital signs monitored throughout session; see below for recorded measurements. Patient with overall maintenance of gait distance traveled. Mild LOB during initial gait trial while turning requiring CGA for recovery of balance. Patient reporting occasional dizziness over course of session. Patient will benefit from continued SNF rehabilitation services to address deficits with progression toward PLOF and increased functional independence.     Performance deficits affecting function: weakness, impaired endurance, impaired functional mobility, gait instability, impaired balance, decreased lower extremity function, decreased safety awareness, pain, impaired cardiopulmonary response to activity.    Rehab Potential is good    Activity Tolerance: Good    Plan:     Patient to be seen 6 x/week to address the above listed problems via gait training, therapeutic activities, therapeutic exercises, neuromuscular re-education    Plan of Care Expires: 02/01/23  Plan of Care Reviewed with: patient    Subjective     "Can we check my blood pressure throughout the session?"     Pain/Comfort:  Pain Rating 1: 7/10  Location - Side 1: Bilateral  Location - Orientation 1: generalized  Location 1: shoulder (B knees and scrotum)  Pain Addressed 1: Pre-medicate for activity, " Reposition, Distraction, Cessation of Activity  Pain Rating Post-Intervention 1: 7/10    Patient's cultural, spiritual, Amish conflicts given the current situation:  no    Objective:     Communicated with nursing staff prior to session.  Patient found ambulatory in room/hernandez with  (no active lines) upon PT entry to room.     Therapeutic Activities and Exercises:   LE ergometer x 4 minutes on moderate resistance for LE ROM, strengthening and endurance; patient reporting fatigue and requesting to discontinue activity.    BLE stretching while patient seated/supine on mat including knee flexion/extension ROM and hip flexion ROM; performed x 5 reps each.    Vitals:  Seated at rest: BP - 114/71 mmHg, HR- 71 bpm, O2 - 95% RA  Following ambulation trial: BP - 115/62 mmHg, HR - 73 bpm, - O2 - 94% RA  Seated at end of session: BP - 108/66 mmHg, HR - 71 bpm, O2 - 93 % RA    Functional Mobility:  Bed Mobility:     Supine to Sit: modified independence and on level mat with no rails  Sit to Supine: modified independence and on level mat with no rails  Transfers:     Sit to Stand:  supervision with rolling walker  Chair to mat: supervision with  no AD  using  Stand Pivot  Gait: Patient ambulated 90 feet and 83 feet using RW with SBA. Seated rest break between trials. LOB during turn on first gait trial; CGA for recovery.  Stairs:  Pt ascended/descended 8 stair(s) with No Assistive Device with bilateral handrails with Stand-by Assistance.     AM-PAC 6 CLICK MOBILITY  20    Patient left sitting edge of bed with call button in reach and nursing staff notified.    GOALS:   Multidisciplinary Problems       Physical Therapy Goals          Problem: Physical Therapy    Goal Priority Disciplines Outcome Goal Variances Interventions   Physical Therapy Goal     PT, PT/OT Ongoing, Progressing     Description: Goals to be met by: 2023     Patient will increase functional independence with mobility by performin. Sit to stand  transfer with Modified [place]  2. Bed to chair transfer with Modified [place] using No Assistive Device  3. Gait  x 150 feet with Modified [place] using rolling walker or cane.   4. Wheelchair propulsion x150 feet with Modified [place]   5. Stand for 10 minutes with [place]                         Time Tracking:     PT Received On: 01/09/23  PT Start Time: 1058  PT Stop Time: 1140  PT Total Time (min): 42 min    Billable Minutes: Gait Training 17, Therapeutic Activity 10, and Therapeutic Exercise 15    Treatment Type: Treatment  PT/PTA: PT     PTA Visit Number: 0     01/09/2023

## 2023-01-10 LAB
ANION GAP SERPL CALC-SCNC: 9 MMOL/L (ref 8–16)
BASOPHILS # BLD AUTO: 0.08 K/UL (ref 0–0.2)
BASOPHILS NFR BLD: 1.1 % (ref 0–1.9)
BUN SERPL-MCNC: 18 MG/DL (ref 8–23)
CALCIUM SERPL-MCNC: 9.2 MG/DL (ref 8.7–10.5)
CHLORIDE SERPL-SCNC: 103 MMOL/L (ref 95–110)
CO2 SERPL-SCNC: 25 MMOL/L (ref 23–29)
CREAT SERPL-MCNC: 1 MG/DL (ref 0.5–1.4)
DIFFERENTIAL METHOD: ABNORMAL
EOSINOPHIL # BLD AUTO: 0.2 K/UL (ref 0–0.5)
EOSINOPHIL NFR BLD: 2.6 % (ref 0–8)
ERYTHROCYTE [DISTWIDTH] IN BLOOD BY AUTOMATED COUNT: 12 % (ref 11.5–14.5)
EST. GFR  (NO RACE VARIABLE): >60 ML/MIN/1.73 M^2
GLUCOSE SERPL-MCNC: 143 MG/DL (ref 70–110)
HCT VFR BLD AUTO: 38.8 % (ref 40–54)
HGB BLD-MCNC: 12.7 G/DL (ref 14–18)
IMM GRANULOCYTES # BLD AUTO: 0.06 K/UL (ref 0–0.04)
IMM GRANULOCYTES NFR BLD AUTO: 0.8 % (ref 0–0.5)
LYMPHOCYTES # BLD AUTO: 3 K/UL (ref 1–4.8)
LYMPHOCYTES NFR BLD: 41.2 % (ref 18–48)
MAGNESIUM SERPL-MCNC: 1.9 MG/DL (ref 1.6–2.6)
MCH RBC QN AUTO: 31.8 PG (ref 27–31)
MCHC RBC AUTO-ENTMCNC: 32.7 G/DL (ref 32–36)
MCV RBC AUTO: 97 FL (ref 82–98)
MONOCYTES # BLD AUTO: 0.8 K/UL (ref 0.3–1)
MONOCYTES NFR BLD: 10.5 % (ref 4–15)
NEUTROPHILS # BLD AUTO: 3.2 K/UL (ref 1.8–7.7)
NEUTROPHILS NFR BLD: 43.8 % (ref 38–73)
NRBC BLD-RTO: 0 /100 WBC
PHOSPHATE SERPL-MCNC: 4.5 MG/DL (ref 2.7–4.5)
PLATELET # BLD AUTO: 321 K/UL (ref 150–450)
PMV BLD AUTO: 12.4 FL (ref 9.2–12.9)
POCT GLUCOSE: 157 MG/DL (ref 70–110)
POCT GLUCOSE: 177 MG/DL (ref 70–110)
POCT GLUCOSE: 231 MG/DL (ref 70–110)
POCT GLUCOSE: 289 MG/DL (ref 70–110)
POTASSIUM SERPL-SCNC: 4.1 MMOL/L (ref 3.5–5.1)
RBC # BLD AUTO: 4 M/UL (ref 4.6–6.2)
SARS-COV-2 RNA RESP QL NAA+PROBE: NOT DETECTED
SODIUM SERPL-SCNC: 137 MMOL/L (ref 136–145)
WBC # BLD AUTO: 7.35 K/UL (ref 3.9–12.7)

## 2023-01-10 PROCEDURE — 84100 ASSAY OF PHOSPHORUS: CPT | Performed by: NURSE PRACTITIONER

## 2023-01-10 PROCEDURE — 97530 THERAPEUTIC ACTIVITIES: CPT | Mod: CQ

## 2023-01-10 PROCEDURE — 97110 THERAPEUTIC EXERCISES: CPT | Mod: CO

## 2023-01-10 PROCEDURE — 25000003 PHARM REV CODE 250: Performed by: NURSE PRACTITIONER

## 2023-01-10 PROCEDURE — 25000003 PHARM REV CODE 250: Performed by: STUDENT IN AN ORGANIZED HEALTH CARE EDUCATION/TRAINING PROGRAM

## 2023-01-10 PROCEDURE — 25000003 PHARM REV CODE 250: Performed by: HOSPITALIST

## 2023-01-10 PROCEDURE — 97116 GAIT TRAINING THERAPY: CPT | Mod: CQ

## 2023-01-10 PROCEDURE — 80048 BASIC METABOLIC PNL TOTAL CA: CPT | Performed by: NURSE PRACTITIONER

## 2023-01-10 PROCEDURE — 83735 ASSAY OF MAGNESIUM: CPT | Performed by: NURSE PRACTITIONER

## 2023-01-10 PROCEDURE — 85025 COMPLETE CBC W/AUTO DIFF WBC: CPT | Performed by: NURSE PRACTITIONER

## 2023-01-10 PROCEDURE — 63600175 PHARM REV CODE 636 W HCPCS: Performed by: NURSE PRACTITIONER

## 2023-01-10 PROCEDURE — 11000004 HC SNF PRIVATE

## 2023-01-10 PROCEDURE — 97530 THERAPEUTIC ACTIVITIES: CPT | Mod: CO

## 2023-01-10 PROCEDURE — U0003 INFECTIOUS AGENT DETECTION BY NUCLEIC ACID (DNA OR RNA); SEVERE ACUTE RESPIRATORY SYNDROME CORONAVIRUS 2 (SARS-COV-2) (CORONAVIRUS DISEASE [COVID-19]), AMPLIFIED PROBE TECHNIQUE, MAKING USE OF HIGH THROUGHPUT TECHNOLOGIES AS DESCRIBED BY CMS-2020-01-R: HCPCS | Performed by: HOSPITALIST

## 2023-01-10 PROCEDURE — 63600175 PHARM REV CODE 636 W HCPCS: Performed by: STUDENT IN AN ORGANIZED HEALTH CARE EDUCATION/TRAINING PROGRAM

## 2023-01-10 PROCEDURE — 36415 COLL VENOUS BLD VENIPUNCTURE: CPT | Performed by: NURSE PRACTITIONER

## 2023-01-10 PROCEDURE — U0005 INFEC AGEN DETEC AMPLI PROBE: HCPCS | Performed by: HOSPITALIST

## 2023-01-10 PROCEDURE — 25000242 PHARM REV CODE 250 ALT 637 W/ HCPCS: Performed by: NURSE PRACTITIONER

## 2023-01-10 RX ORDER — MUPIROCIN 20 MG/G
OINTMENT TOPICAL 2 TIMES DAILY
Status: COMPLETED | OUTPATIENT
Start: 2023-01-10 | End: 2023-01-15

## 2023-01-10 RX ADMIN — OXYCODONE HYDROCHLORIDE 10 MG: 10 TABLET, FILM COATED, EXTENDED RELEASE ORAL at 11:01

## 2023-01-10 RX ADMIN — SODIUM CHLORIDE 1000 MG: 1 TABLET ORAL at 08:01

## 2023-01-10 RX ADMIN — INSULIN ASPART 14 UNITS: 100 INJECTION, SOLUTION INTRAVENOUS; SUBCUTANEOUS at 08:01

## 2023-01-10 RX ADMIN — INSULIN DETEMIR 5 UNITS: 100 INJECTION, SOLUTION SUBCUTANEOUS at 09:01

## 2023-01-10 RX ADMIN — FAMOTIDINE 20 MG: 20 TABLET ORAL at 09:01

## 2023-01-10 RX ADMIN — EZETIMIBE 10 MG: 10 TABLET ORAL at 08:01

## 2023-01-10 RX ADMIN — METOPROLOL TARTRATE 50 MG: 50 TABLET, FILM COATED ORAL at 08:01

## 2023-01-10 RX ADMIN — DOXYCYCLINE HYCLATE 100 MG: 100 TABLET, COATED ORAL at 09:01

## 2023-01-10 RX ADMIN — INSULIN ASPART 14 UNITS: 100 INJECTION, SOLUTION INTRAVENOUS; SUBCUTANEOUS at 05:01

## 2023-01-10 RX ADMIN — MUPIROCIN: 20 OINTMENT TOPICAL at 09:01

## 2023-01-10 RX ADMIN — FOLIC ACID 1 MG: 1 TABLET ORAL at 08:01

## 2023-01-10 RX ADMIN — DICLOFENAC 4 G: 10 GEL TOPICAL at 09:01

## 2023-01-10 RX ADMIN — HYDROXYZINE HYDROCHLORIDE 25 MG: 25 TABLET, FILM COATED ORAL at 11:01

## 2023-01-10 RX ADMIN — ESCITALOPRAM OXALATE 20 MG: 10 TABLET ORAL at 09:01

## 2023-01-10 RX ADMIN — HEPARIN SODIUM 5000 UNITS: 5000 INJECTION INTRAVENOUS; SUBCUTANEOUS at 05:01

## 2023-01-10 RX ADMIN — DILTIAZEM HYDROCHLORIDE 120 MG: 120 CAPSULE, COATED, EXTENDED RELEASE ORAL at 09:01

## 2023-01-10 RX ADMIN — INSULIN DETEMIR 44 UNITS: 100 INJECTION, SOLUTION SUBCUTANEOUS at 08:01

## 2023-01-10 RX ADMIN — INSULIN ASPART 2 UNITS: 100 INJECTION, SOLUTION INTRAVENOUS; SUBCUTANEOUS at 09:01

## 2023-01-10 RX ADMIN — HEPARIN SODIUM 5000 UNITS: 5000 INJECTION INTRAVENOUS; SUBCUTANEOUS at 01:01

## 2023-01-10 RX ADMIN — OXYCODONE HYDROCHLORIDE 15 MG: 5 TABLET ORAL at 05:01

## 2023-01-10 RX ADMIN — OXYCODONE HYDROCHLORIDE 15 MG: 5 TABLET ORAL at 08:01

## 2023-01-10 RX ADMIN — METOPROLOL TARTRATE 50 MG: 50 TABLET, FILM COATED ORAL at 09:01

## 2023-01-10 RX ADMIN — OXYCODONE HYDROCHLORIDE 15 MG: 5 TABLET ORAL at 01:01

## 2023-01-10 RX ADMIN — DILTIAZEM HYDROCHLORIDE 120 MG: 120 CAPSULE, COATED, EXTENDED RELEASE ORAL at 08:01

## 2023-01-10 RX ADMIN — LIDOCAINE 1 PATCH: 50 PATCH CUTANEOUS at 12:01

## 2023-01-10 RX ADMIN — DIGOXIN 125 MCG: 125 TABLET ORAL at 08:01

## 2023-01-10 RX ADMIN — OXYCODONE HYDROCHLORIDE 15 MG: 5 TABLET ORAL at 04:01

## 2023-01-10 RX ADMIN — FLUTICASONE PROPIONATE 100 MCG: 50 SPRAY, METERED NASAL at 10:01

## 2023-01-10 RX ADMIN — OXYCODONE HYDROCHLORIDE AND ACETAMINOPHEN 500 MG: 500 TABLET ORAL at 08:01

## 2023-01-10 RX ADMIN — ASPIRIN 81 MG 81 MG: 81 TABLET ORAL at 08:01

## 2023-01-10 RX ADMIN — FAMOTIDINE 20 MG: 20 TABLET ORAL at 08:01

## 2023-01-10 RX ADMIN — PREDNISONE 20 MG: 20 TABLET ORAL at 08:01

## 2023-01-10 RX ADMIN — ZINC SULFATE 220 MG (50 MG) CAPSULE 220 MG: CAPSULE at 08:01

## 2023-01-10 RX ADMIN — CETIRIZINE HYDROCHLORIDE 10 MG: 5 TABLET, FILM COATED ORAL at 08:01

## 2023-01-10 RX ADMIN — DOXYCYCLINE HYCLATE 100 MG: 100 TABLET, COATED ORAL at 08:01

## 2023-01-10 RX ADMIN — POTASSIUM CHLORIDE 40 MEQ: 1500 TABLET, EXTENDED RELEASE ORAL at 08:01

## 2023-01-10 RX ADMIN — THERA TABS 1 TABLET: TAB at 08:01

## 2023-01-10 RX ADMIN — OXYCODONE HYDROCHLORIDE 10 MG: 10 TABLET, FILM COATED, EXTENDED RELEASE ORAL at 10:01

## 2023-01-10 RX ADMIN — INSULIN ASPART 14 UNITS: 100 INJECTION, SOLUTION INTRAVENOUS; SUBCUTANEOUS at 12:01

## 2023-01-10 RX ADMIN — Medication 1 CAPSULE: at 08:01

## 2023-01-10 RX ADMIN — OXYCODONE HYDROCHLORIDE 15 MG: 5 TABLET ORAL at 09:01

## 2023-01-10 NOTE — PT/OT/SLP PROGRESS
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  He presents with limitations in performance of self-care, functional mobility, and ADLs. Performance deficits affecting function are weakness, impaired endurance, impaired sensation, impaired self care skills, impaired functional mobility, gait instability, impaired balance, decreased coordination, decreased upper extremity function, decreased safety awareness, pain, decreased ROM, impaired cardiopulmonary response to activity. Pt tolerated Tx without incident. Blood pressure was monitored when standing. Pt is making progress but continues to require assist to perform self care tasks, functional mobility and functional transfers. Pt would continue to benefit from OT intervention to further functional (I)ce and safety.    Rehab Potential is good    Activity tolerance:  Good    Plan:     Patient to be seen 5 x/week to address the above listed problems via self-care/home management, therapeutic activities, therapeutic exercises    Plan of Care Expires: 01/31/23  Plan of Care Reviewed with: patient    Subjective     Communicated with: Nurse prior to session.    Pain/Comfort:  Pain Rating 1: 6/10  Location - Side 1: Bilateral  Location - Orientation 1: generalized  Location 1: shoulder  Pain Addressed 1: Pre-medicate for activity, Distraction  Pain Rating Post-Intervention 1: 6/10  Pain  Rating 2: 5/10  Location 2:  (scrotum and R knee)  Pain Addressed 2: Pre-medicate for activity, Distraction  Pain Rating Post-Intervention 2: 5/10    Patient's cultural, spiritual, Denominational conflicts given the current situation:  yes    Objective:     Patient found sitting edge of bed with  (no active lines) upon OT entry to room.    Bed Mobility:    Pt seated at EOB at onset of treatment session.     Functional Mobility/Transfers:  Patient completed Sit <> Stand Transfer with supervision  with  rolling walker   Patient completed Bed <> Chair Transfer using Stand Pivot technique with supervision with rolling walker  Functional Mobility: Pt propelled W/C from room to therapy gym using BUE to propel chair.    [facility] 6 Click ADL: 21    OT Exercises: Pt performed UE exercise with 3 lb weighted bar through functional ROM as tolerated with repetition of 15 x3.  UE exercises performed to increase functional endurance and strength in order increase independence when performing self care tasks, functional ambulation, W/C propulsion, and functional standing activities.    Treatment & Education:  Pt participated in standing activity with sup and RW. Pt at raised counter to perform fine motor and visual scanning activity with focus on standing tolerance, functional reaching, dynamic standing bal, crossing midline, and to promote independence with homemaking and self care tasks. Pt tolerated standing for 7 min and 41 sec     Pt educated on role of OT, safety while performing functional transfers, safety while performing self care tasks, and progress towards OT goals    Patient left up in chair in therapy room with  PTA present    GOALS:   Multidisciplinary Problems       Occupational Therapy Goals          Problem: Occupational Therapy    Goal Priority Disciplines Outcome Interventions   Occupational Therapy Goal     OT, PT/OT Ongoing, Progressing    Description: Goals to be met by: 1/30/23     Patient will increase functional  independence with ADLs by performing:    LE Dressing with Modified [place].  Grooming while standing at sink with Supervision.  Toileting from toilet with Supervision for hygiene and clothing management.   Bathing from  shower chair/bench with Supervision.  Step transfer with Modified [place]  Upper extremity exercise program, with [place].   Caregiver will be educated on level of assistance required to safely perform self care and functional transfers.                         Time Tracking:     OT Date of Treatment: 01/10/23  OT Start Time: 1352    OT Stop Time: 1425  OT Total Time (min): 33 min    Billable Minutes:Therapeutic Activity 18  Therapeutic Exercise 15    1/10/2023

## 2023-01-10 NOTE — PT/OT/SLP PROGRESS
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 requiring light assistance and verbal cues for transfers, gait, and stairs to prevent falls due to weakness, instability, fatigue.   Pt remains motivated to participate in PT session and will cont to benefit from skilled PT intervention..  .      Performance deficits affecting function: weakness, impaired endurance, impaired functional mobility, gait instability, impaired balance, decreased lower extremity function, decreased safety awareness, pain, impaired cardiopulmonary response to activity.    Rehab Potential is good    Activity Tolerance: Good    Plan:     Patient to be seen 6 x/week to address the above listed problems via gait training, therapeutic activities, therapeutic exercises, neuromuscular re-education    Plan of Care Expires: 02/01/23  Plan of Care Reviewed with: patient    Subjective     "Let's not push it today, my BP has been running a little low".     Pain/Comfort:  Pain Rating 1: 0/10  Pain Rating Post-Intervention 1: 0/10    Patient's cultural, spiritual, Gnosticism conflicts given the current situation:  no    Objective:     Communicated with OT prior to session.  Patient found  up in chair in gym area  with  (no active lines)      Functional Mobility:  Transfers:     Sit to Stand:  supervision with rolling walker  Chair to EOB: stand by assistance with  no AD  using  " Step Transfer  Gait: RW SBA 83ft and then 125ft (1 rest break in standing) with chair follow.  Pt demonstrates instability, but no LOB  Wheelchair Propulsion:  Pt propelled Standard wheelchair x 250 feet on Level tile with  Bilateral upper extremity with Modified [place].     AM-PAC 6 CLICK MOBILITY  20    Patient left sitting edge of bed with call button in reach.    GOALS:   Multidisciplinary Problems       Physical Therapy Goals          Problem: Physical Therapy    Goal Priority Disciplines Outcome Goal Variances Interventions   Physical Therapy Goal     PT, PT/OT Ongoing, Progressing     Description: Goals to be met by: 2023     Patient will increase functional independence with mobility by performin. Sit to stand transfer with Modified [place]  2. Bed to chair transfer with Modified [place] using No Assistive Device  3. Gait  x 150 feet with Modified [place] using rolling walker or cane.   4. Wheelchair propulsion x150 feet with Modified [place]   5. Stand for 10 minutes with [place]                         Time Tracking:     PT Received On: 01/10/23  PT Start Time: 1426  PT Stop Time: 1451  PT Total Time (min): 25 min    Billable Minutes: Gait Training 16 and Therapeutic Activity 9    Treatment Type: Treatment  PT/PTA: PTA     PTA Visit Number: 1     01/10/2023

## 2023-01-10 NOTE — PROGRESS NOTES
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 male with PMH of DM2, CAD, HTN, and HLD who presents to [facility] for treatment of sepsis and debility. Patient presented to urgent care with complaints of scrotal pain, redness and fever. He was advised to present to ED. Patient presented to the ED the next day. In ED he reported, fevers and chills at home, nausea, decreased PO intake, and multiple syncopal episodes with +incontinence of stool. 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  Empiric initiated with cefepime, clindamycin, metronidazole, and vancomycin.  Urology consulted and he underwent emergent excision and debridement of necrotic tissue on 11/15. Repeat washout performed 11/17. Improved and stepped down from ICU 11/18.  Bedside debridement performed on 11/22.  ID consulted and antibiotics regimen changed to cefepime and Flagyl known.  Wound cultures with Anaerococcus and Prevotella species.  Renal function normalized.  Return of SVT and Cardiology consulted with start of metoprolol. Patient deemed medically stable. Transferred to [facility] on 12/1/22. He completed course of cefepime and flagyl for treatment of kami's gangrene. Discharged to OS on 12/31/22.  Today, he has multiple concerns. He would like to make sure he has follow ups scheduled with cardiology, infectious disease, and rheumatology. Also reporting occasional dizziness. He refused his lasix this morning. Reports poor po intake and loss of 50 pounds over the past few months. Will hold lasix for now. He also is requesting to  stop his prednisone, will discontinue. Reports having some sinus congestion and pressure in his ears. Currently on course of zyrtec.      Interval history:  All of today's labs reviewed and are listed below.  Na improved to 137. 24 hr vital sign ranges listed below.  Patient states he feels great today.  Patient denies shortness of breath, abdominal discomfort, nausea, or vomiting.  Patient reports an adequate appetite.  Patient denies dysuria.  Patient reports having regular bowel movements.  Patient progessing with PT/OT. Continuing to follow and treat all acute and chronic conditions.      Past Medical History: Patient has a past medical history of Arthritis, Atrial myxoma, CHF (congestive heart failure), Coronary atherosclerosis, Diabetes mellitus, type 2, Difficult intubation, Hepatitis B, Hyperlipidemia, Hypertension, Non-alcoholic fatty liver disease, Rheumatoid arthritis, Rheumatoid arthritis flare (07/12/2021), Stroke, and Systolic heart failure.    Past Surgical History: Patient has a past surgical history that includes Pleura biopsy; Resection of atrial myxoma (2007); Lung lobectomy (Right, 2008); Coronary angiography (N/A, 3/10/2021); Coronary stent placement (03/10/2021); Incision and drainage of scrotum (N/A, 11/15/2022); and Incision and drainage of scrotum (N/A, 11/17/2022).    Social History: Patient reports that he has been smoking cigarettes. He has a 35.00 pack-year smoking history. He has never used smokeless tobacco. He reports that he does not currently use alcohol. He reports that he does not use drugs.    Family History: family history includes Cancer in his sister; Diabetes type I in his father; Diabetes type II in his mother; Hodgkin's lymphoma in his mother; Kidney failure in his father.    Allergies: Patient is allergic to enbrel [etanercept], nsaids (non-steroidal anti-inflammatory drug), statins-hmg-coa reductase inhibitors, and pcn [penicillins].    ROS  Constitutional: Negative for  fever   Eyes: Negative for blurred vision, double vision +left eye conjunctivitis   Respiratory: Negative for cough, shortness of breath.  +nasal congestion   Cardiovascular: Negative for chest pain, palpitations, and leg swelling.   Gastrointestinal: Negative for abdominal pain, constipation, diarrhea, nausea, vomiting.   Genitourinary: Negative for dysuria, frequency   Musculoskeletal:  + generalized weakness. Negative for back pain and myalgias.   Skin: Negative for itching and rash.   Neurological: Negative for dizziness, headaches.   Psychiatric/Behavioral: Negative for depression. The patient is not nervous/anxious.      24 hour Vital Sign Range   Temp:  [97.3 °F (36.3 °C)-97.8 °F (36.6 °C)]   Pulse:  [17-91]   Resp:  [17-18]   BP: (131-166)/(71-92)   SpO2:  [96 %]     Current BMI: Body mass index is 28.69 kg/m².    PEx  Constitutional: Patient appears debilitated.  No distress noted  HENT:   Head: Normocephalic and atraumatic.   Eyes: Pupils are equal, round  Neck: Normal range of motion. Neck supple.   Cardiovascular: Normal rate, regular rhythm and normal heart sounds.    Pulmonary/Chest: Effort normal and breath sounds are clear  Abdominal: Soft. Bowel sounds are normal.   Musculoskeletal: Normal range of motion.   Neurological: Alert and oriented to person, place, and time.   Psychiatric: Normal mood and affect. Behavior is normal.   Skin: Skin is warm and dry.  Scrotal wound             Incision/Site 11/17/22 1639 Left Scrotum lateral vertical   Date First Assessed/Time First Assessed: 11/17/22 1639   Side: Left  Location: Scrotum  Orientation: lateral  Incision Type: vertical   Incision WDL ex   Dressing Appearance Open to air   Drainage Amount None   Drainage Characteristics/Odor No odor   Appearance Pink;Moist;Epithelialization;Granulating   Red (%), Wound Tissue Color 100 %   Periwound Area Intact;Dry;Pink   Wound Edges Open   Wound Length (cm) 6 cm   Wound Width (cm) 3.7 cm   Wound Depth (cm) 0.2 cm    Wound Volume (cm^3) 4.44 cm^3   Wound Surface Area (cm^2) 22.2 cm^2   Tunneling (depth (cm)/location) 0   Undermining (depth (cm)/location) 0       Recent Labs   Lab 01/10/23  0400      K 4.1      CO2 25   BUN 18   CREATININE 1.0   MG 1.9       Recent Labs   Lab 01/10/23  0400   WBC 7.35   RBC 4.00*   HGB 12.7*   HCT 38.8*      MCV 97   MCH 31.8*   MCHC 32.7         Assessment and Plan:    Hyponatremia  Slight hyponatremia with sodium 132.  Initiate order for salt tabs 1g bid.  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 Moisten gauze with VASHE and wring out excess liquid. Apply to wound bed tucking into all wound tunnels and undermining so that all aspects of the wound bed are covered. Add additional to lightly fill the wound bed (not tightly pack). Cover with telfa island dressing cut in half- then  Abd pads/ mesh underwear. Change per BID or if dressing comes loose or saturated.  Wound care nurse following  1/6-Reports having an area of drainage around his perineum. Assessed area on exam today, no redness or drainage noted. No fever or leukocytosis. Will initiate order for doxycycline 100mg bid as patient will be high risk for infection with the resumption of steroids.- end date 5 days     Rheumatoid Arthritis  1/6-Elevated inflammatory markers, sed rate 78, CRP 19.7, CCP antibodies 511, rheumatoid factor 914.  Discussed resuming prednisone. Patient agreeable to resume prednisone. Discussed with patient's rheumatologist, recommended started prednisone 20mg x 7 days, then 10mg x 7 days, then going down to 5mg    Moderate Protein-Calorie Malnutrition  Related to (etiology):  Inability to meet  physiological needs PO in the presence of sepsis     Signs and Symptoms (as evidenced by):  Energy Intake: <75% of estimated energy requirement for > one month     Muscle Mass Depletion: mild depletion of clavicle region and interosseous muscle , moderate loss to hands and thighs  Weight Loss: 18%x < one month   Regular diet  Commercial beverage(calories,protein) boost plus TID chocolate  Vitamin/mineral supplement therapy- MVI  Initiate order for zinc sulfate daily x14 days, ascorbic acid 500 mg x 30 days, and probiotic daily per RD recommendations          Type 2 Diabetes mellitus with hyperglycemia, with long-term current use of insulin  Continue insulin aspart 14 units tid with meals  Insulin detemir 44 units daily  Insulin detemir 5 units nightly  Accuchecks ac and hs  SSI  1/9 stable 24 hour EEG is 157-186     Coronary artery disease  Continue asa daily     Chronic diastolic heart failure  Continue digoxin 125mcg daily  Continue diltiazem 120mg bid  Continue metoprolol 50 mg bid  Holding lasix due to poor po intake/ intermittent dizziness     Hypokalemia  On Kcl 40mEq daily  May need to d/c now that we are hold lasix, monitor closely     Conjunctivitis  Continue gentamicin drops tid     Otitis media with effusion     Hyperlipidemia  Continue zetia 10mg daily     Dysphoric mood  Continue lexapro 20mg daily        Anticipate disposition:    Home with home health          Anticipate disposition:  Home with home health      Follow-up needed during SNF stay-    Follow-up needed after discharge from SNF: PCP,     Future Appointments   Date Time Provider Department Center   1/18/2023  3:00 PM Kevin Lares MD [facility] [id number] [facility]   1/18/2023  3:30 PM Lalitha Stroud RN, CDE [facility] [id number] [facility]   2/2/2023  3:00 PM Shemar Dempsey MD [facility] [id number] [facility]         Wandy Veliz NP  Department of Hospital Medicine   [facility]     DOS: 1/10/2023       Patient note was  created using MModal Dictation.  Any errors in syntax or even information may not have been identified and edited on initial review prior to signing this note.

## 2023-01-11 LAB
POCT GLUCOSE: 130 MG/DL (ref 70–110)
POCT GLUCOSE: 145 MG/DL (ref 70–110)
POCT GLUCOSE: 158 MG/DL (ref 70–110)
POCT GLUCOSE: 162 MG/DL (ref 70–110)

## 2023-01-11 PROCEDURE — 11000004 HC SNF PRIVATE

## 2023-01-11 PROCEDURE — 25000003 PHARM REV CODE 250: Performed by: NURSE PRACTITIONER

## 2023-01-11 PROCEDURE — 97110 THERAPEUTIC EXERCISES: CPT | Mod: CQ

## 2023-01-11 PROCEDURE — 25000242 PHARM REV CODE 250 ALT 637 W/ HCPCS: Performed by: NURSE PRACTITIONER

## 2023-01-11 PROCEDURE — 63600175 PHARM REV CODE 636 W HCPCS: Performed by: STUDENT IN AN ORGANIZED HEALTH CARE EDUCATION/TRAINING PROGRAM

## 2023-01-11 PROCEDURE — 97530 THERAPEUTIC ACTIVITIES: CPT | Mod: CO

## 2023-01-11 PROCEDURE — 25000003 PHARM REV CODE 250: Performed by: STUDENT IN AN ORGANIZED HEALTH CARE EDUCATION/TRAINING PROGRAM

## 2023-01-11 PROCEDURE — 97116 GAIT TRAINING THERAPY: CPT | Mod: CQ

## 2023-01-11 RX ORDER — OXYCODONE HYDROCHLORIDE 10 MG/1
20 TABLET ORAL EVERY 4 HOURS PRN
Status: DISCONTINUED | OUTPATIENT
Start: 2023-01-11 | End: 2023-01-12

## 2023-01-11 RX ADMIN — ESCITALOPRAM OXALATE 20 MG: 10 TABLET ORAL at 09:01

## 2023-01-11 RX ADMIN — OXYCODONE HYDROCHLORIDE 20 MG: 10 TABLET ORAL at 09:01

## 2023-01-11 RX ADMIN — OXYCODONE HYDROCHLORIDE 20 MG: 10 TABLET ORAL at 01:01

## 2023-01-11 RX ADMIN — ERYTHROMYCIN: 5 OINTMENT OPHTHALMIC at 09:01

## 2023-01-11 RX ADMIN — INSULIN ASPART 14 UNITS: 100 INJECTION, SOLUTION INTRAVENOUS; SUBCUTANEOUS at 08:01

## 2023-01-11 RX ADMIN — FLUTICASONE PROPIONATE 100 MCG: 50 SPRAY, METERED NASAL at 09:01

## 2023-01-11 RX ADMIN — THERA TABS 1 TABLET: TAB at 09:01

## 2023-01-11 RX ADMIN — OXYCODONE HYDROCHLORIDE 15 MG: 5 TABLET ORAL at 01:01

## 2023-01-11 RX ADMIN — FAMOTIDINE 20 MG: 20 TABLET ORAL at 09:01

## 2023-01-11 RX ADMIN — OXYCODONE HYDROCHLORIDE 10 MG: 10 TABLET, FILM COATED, EXTENDED RELEASE ORAL at 10:01

## 2023-01-11 RX ADMIN — DILTIAZEM HYDROCHLORIDE 120 MG: 120 CAPSULE, COATED, EXTENDED RELEASE ORAL at 09:01

## 2023-01-11 RX ADMIN — Medication 1 CAPSULE: at 09:01

## 2023-01-11 RX ADMIN — DICLOFENAC 4 G: 10 GEL TOPICAL at 09:01

## 2023-01-11 RX ADMIN — INSULIN ASPART 14 UNITS: 100 INJECTION, SOLUTION INTRAVENOUS; SUBCUTANEOUS at 05:01

## 2023-01-11 RX ADMIN — ZINC SULFATE 220 MG (50 MG) CAPSULE 220 MG: CAPSULE at 09:01

## 2023-01-11 RX ADMIN — INSULIN DETEMIR 5 UNITS: 100 INJECTION, SOLUTION SUBCUTANEOUS at 09:01

## 2023-01-11 RX ADMIN — MUPIROCIN: 20 OINTMENT TOPICAL at 09:01

## 2023-01-11 RX ADMIN — METOPROLOL TARTRATE 50 MG: 50 TABLET, FILM COATED ORAL at 09:01

## 2023-01-11 RX ADMIN — LIDOCAINE 1 PATCH: 50 PATCH CUTANEOUS at 01:01

## 2023-01-11 RX ADMIN — OXYCODONE HYDROCHLORIDE AND ACETAMINOPHEN 500 MG: 500 TABLET ORAL at 09:01

## 2023-01-11 RX ADMIN — HYDROXYZINE HYDROCHLORIDE 25 MG: 25 TABLET, FILM COATED ORAL at 09:01

## 2023-01-11 RX ADMIN — EZETIMIBE 10 MG: 10 TABLET ORAL at 09:01

## 2023-01-11 RX ADMIN — CETIRIZINE HYDROCHLORIDE 10 MG: 5 TABLET, FILM COATED ORAL at 09:01

## 2023-01-11 RX ADMIN — DIGOXIN 125 MCG: 125 TABLET ORAL at 09:01

## 2023-01-11 RX ADMIN — INSULIN DETEMIR 44 UNITS: 100 INJECTION, SOLUTION SUBCUTANEOUS at 09:01

## 2023-01-11 RX ADMIN — PREDNISONE 20 MG: 20 TABLET ORAL at 09:01

## 2023-01-11 RX ADMIN — ASPIRIN 81 MG 81 MG: 81 TABLET ORAL at 09:01

## 2023-01-11 RX ADMIN — OXYCODONE HYDROCHLORIDE 15 MG: 5 TABLET ORAL at 05:01

## 2023-01-11 RX ADMIN — ERYTHROMYCIN 1 INCH: 5 OINTMENT OPHTHALMIC at 01:01

## 2023-01-11 RX ADMIN — SENNOSIDES AND DOCUSATE SODIUM 1 TABLET: 50; 8.6 TABLET ORAL at 09:01

## 2023-01-11 RX ADMIN — INSULIN ASPART 14 UNITS: 100 INJECTION, SOLUTION INTRAVENOUS; SUBCUTANEOUS at 12:01

## 2023-01-11 RX ADMIN — DOXYCYCLINE HYCLATE 100 MG: 100 TABLET, COATED ORAL at 09:01

## 2023-01-11 RX ADMIN — FOLIC ACID 1 MG: 1 TABLET ORAL at 09:01

## 2023-01-11 RX ADMIN — OXYCODONE HYDROCHLORIDE 20 MG: 10 TABLET ORAL at 05:01

## 2023-01-11 RX ADMIN — Medication 6 MG: at 09:01

## 2023-01-11 RX ADMIN — OXYCODONE HYDROCHLORIDE 15 MG: 5 TABLET ORAL at 09:01

## 2023-01-11 NOTE — TREATMENT PLAN
Family Training     Patient Name:  Nithin Wagner   MRN:  [id number]  Admit Date: 12/31/2022      Pamela bolaños. rehab attempted to schedule family training this date. Unable to schedule due to pt refusal of training. Educated pt on benefits of family training prior to discharge. Pt verbalized understanding but continued to decline training session.    1/11/2023

## 2023-01-11 NOTE — PT/OT/SLP PROGRESS
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     Pt was agreeable and tolerated session fairly well. Pt ambulated around the therapy gym with closeSBA and RW. Minor LOB noted, but no physical assistance required to recover. Pt completed seated and standing therex with occasional rest break. Pt continues to benefit from therpay to improve functional endurance, strength, and mobility.     Performance deficits affecting function: weakness, impaired endurance, impaired functional mobility, gait instability, impaired balance, decreased lower extremity function, decreased safety awareness, pain, impaired cardiopulmonary response to activity.    Rehab Potential is good    Activity Tolerance: Good    Plan:     Patient to be seen 6 x/week to address the above listed problems via gait training, therapeutic activities, therapeutic exercises, neuromuscular re-education    Plan of Care Expires: 02/01/23  Plan of Care Reviewed with: patient    Subjective     "My bedroom is on the 2nd floor".     Pain/Comfort:  Pain Rating 1: 6/10  Location - Side 1: Left  Location 1:  (shoulder and elbow)  Pain Addressed 1: Pre-medicate for activity, Reposition, Distraction  Pain Rating Post-Intervention 1: 6/10    Patient's cultural, spiritual, Baptist conflicts given the current situation:  no    Objective:     Patient found  up in wheelchair  with  (no active " lines) upon PT entry to room.     Therapeutic Activities and Exercises:   Seated BLE therex x15 reps: LAQ and marching  Standing BLE therex x10 reps: marching, calf raises, hip flex/abd/ext  BUE on outside of parallel bars for safety and   Patient educated on role of therapy, goals of session, and benefits of out of bed mobility.   Instructed on use of call button and importance of calling nursing staff for assistance with mobility   Questions/concerns addressed within PTA scope of practice  Pt verbalized understanding.    Vitals: BP (MAP), no reports of dizziness or lightheadedness during session  Seated: 126/58 (79)  Post gait: 132/66 (87)  Post [address] Therex: 122/65 (88)  Post 2nd st[address] Therex: 106/63 (79)    Functional Mobility:  Transfers:   Sit <> Stand Transfer: stand by assistance with rolling walker   Wheelchair > Chair Transfer: stand by assistance with rolling walker using step transfer (~5ft)  Gait:  Pt ambulated ~180ft with  closeSBA  and rolling walker. Wheelchair following  Gait Pattern observed: reciprocal gait  Gait Deviation(s): mostly steady gait with good gilberto and step length  Verbal/tactile cues for pacing and posture   2 minor LOB noted, no assistance required to recover  Wheelchair Propulsion:    Pt propelled Standard wheelchair x ~140 feet on Level tile with  Bilateral upper extremity with Supervision or Set-up Assistance.     AM-PAC 6 CLICK MOBILITY  20    Patient left up in chair with call button in reach.    GOALS:   Multidisciplinary Problems       Physical Therapy Goals          Problem: Physical Therapy    Goal Priority Disciplines Outcome Goal Variances Interventions   Physical Therapy Goal     PT, PT/OT Ongoing, Progressing     Description: Goals to be met by: 2023     Patient will increase functional independence with mobility by performin. Sit to stand transfer with Modified [place]  2. Bed to chair transfer with Modified [place] using No Assistive Device  3.  Gait  x 150 feet with Modified [place] using rolling walker or cane.   4. Wheelchair propulsion x150 feet with Modified [place]   5. Stand for 10 minutes with [place]  New Goal: 1/12/23 Ascend/descend 12 steps with B HR and Supervision                         Time Tracking:     PT Received On: 01/11/23  PT Start Time: 1430  PT Stop Time: 1500  PT Total Time (min): 30 min    Billable Minutes: Gait Training 15 and Therapeutic Exercise 15    Treatment Type: Treatment  PT/PTA: PTA     PTA Visit Number: 2     01/11/2023

## 2023-01-11 NOTE — PLAN OF CARE
Patient was served a NOMNC per "[employer]"; service end date: 1/13/23; patient plan to appeal.CONFIRMATION #  [id number]  Case [id number]

## 2023-01-11 NOTE — PLAN OF CARE
Problem: Physical Therapy  Goal: Physical Therapy Goal  Description: Goals to be met by: 2023     Patient will increase functional independence with mobility by performin. Sit to stand transfer with Modified [place]  2. Bed to chair transfer with Modified [place] using No Assistive Device  3. Gait  x 150 feet with Modified [place] using rolling walker or cane.   4. Wheelchair propulsion x150 feet with Modified [place]   5. Stand for 10 minutes with [place]  New Goal: 23 Ascend/descend 12 steps with B HR and Supervision    Outcome: Ongoing, Progressing

## 2023-01-11 NOTE — PT/OT/SLP PROGRESS
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  He presents with limitations in performance of self-care, functional mobility, and ADLs. Performance deficits affecting function are weakness, impaired endurance, impaired sensation, impaired self care skills, impaired functional mobility, gait instability, impaired balance, decreased coordination, decreased upper extremity function, decreased safety awareness, pain, decreased ROM, impaired cardiopulmonary response to activity. Pt tolerated Tx without incident, however declined any therex secondary to pain in B shoulders and L elbow. Blood pressure was monitored when standing. Pt is making progress but continues to require assist to perform self care tasks, functional mobility and functional transfers. Pt would continue to benefit from OT intervention to further functional (I)ce and safety.    Rehab Potential is good    Activity tolerance:  Fair    Plan:     Patient to be seen 5 x/week to address the above listed problems via self-care/home management, therapeutic activities, therapeutic exercises    Plan of Care Expires: 01/31/23  Plan of Care Reviewed with: patient    Subjective     Communicated with: Nurse prior to session.    Pain/Comfort:  Pain Rating 1: 6/10  Location 1:  (L Elbow, B shoulders, L knee)  Pain Addressed 1: Pre-medicate for activity, Distraction, Reposition  Pain  Rating Post-Intervention 1: 6/10    Patient's cultural, spiritual, Catholic conflicts given the current situation:  yes    Objective:     Patient found sitting edge of bed with  (no active lines) upon OT entry to room.    Bed Mobility:    Pt seated at EOB at onset of treatment session.     Functional Mobility/Transfers:  Patient completed Sit <> Stand Transfer with supervision  with  no assistive device and rolling walker   Patient completed Bed <> Chair Transfer using Stand Pivot technique with supervision with no assistive device  Functional Mobility: Pt propelled W/C from room to therapy gym with sup for approximately 100 ft using BUE to propel chair.    [facility] 6 Click ADL: 21    Treatment & Education:  Pt participated in standing activity with sup and RW. Pt at raised counter to perform fine motor and visual scanning activity tolerating standing for 6 min and 3 sec.   Pt participated in gross motor dynamic standing activity with CGA and RW with large exercise ball.   Standing activities with focus on standing tolerance, functional reaching, dynamic standing bal, crossing midline, and to promote independence with homemaking and self care tasks.    Pt educated on role of OT, safety while performing functional transfers, safety while performing self care tasks and progress towards OT goals      Patient left up in chair in therapy gym with  PTA present    GOALS:   Multidisciplinary Problems       Occupational Therapy Goals          Problem: Occupational Therapy    Goal Priority Disciplines Outcome Interventions   Occupational Therapy Goal     OT, PT/OT Ongoing, Progressing    Description: Goals to be met by: 1/30/23     Patient will increase functional independence with ADLs by performing:    LE Dressing with Modified [place].  Grooming while standing at sink with Supervision.  Toileting from toilet with Supervision for hygiene and clothing management.   Bathing from  shower chair/bench with Supervision.  Step  transfer with Modified [place]  Upper extremity exercise program, with [place].   Caregiver will be educated on level of assistance required to safely perform self care and functional transfers.                         Time Tracking:     OT Date of Treatment: 01/11/23  OT Start Time: 1354    OT Stop Time: 1420  OT Total Time (min): 26 min    Billable Minutes:Therapeutic Activity 26 1/11/2023

## 2023-01-11 NOTE — PLAN OF CARE
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       Follow Up Appointments:  Future Appointments   Date Time Provider Department Center   1/18/2023  2:00 PM Shemar Dempsey MD [facility] [id number] [facility]   1/18/2023  3:00 PM Kevin Lares MD [facility] [id number] [facility]   1/18/2023  3:30 PM Lalitha Stroud RN, CDE [facility] [id number] [facility]   2/2/2023  3:00 PM Shemar Dempsey MD [facility] [id number] [facility]   2/17/2023  8:00 AM Korin Alcala MD [facility] RHEUM [facility]       Allergies:  Review of patient's allergies indicates:   Allergen Reactions    Enbrel [etanercept] Shortness Of Breath     CHF    Nsaids (non-steroidal anti-inflammatory drug) Other (See Comments)     hypertention  Other reaction(s): Other (See Comments)  hypertention  HTN    Statins-hmg-coa reductase inhibitors Other (See Comments)     Heart arrythemias  Other reaction(s): Other (See Comments)  Heart arrythemias  Joint pain and cardiac arrythmias    Pcn [penicillins] Rash       Medications: Review discharge medications with patient and family and provide education.      I have seen and examined this patient  within the last 30 days. My clinical findings that support the need for the home health skilled services and home bound status are the following:no   Weakness/numbness causing balance and gait disturbance due to Infection making it taxing to leave home.     Referrals/ Consults  Physical Therapy to evaluate and treat. Evaluate for home safety and equipment needs; Establish/upgrade home exercise program. Perform / instruct on therapeutic exercises, gait training, transfer training, and Range of Motion.  Occupational Therapy to evaluate and treat. Evaluate home environment for safety and equipment needs. Perform/Instruct on transfers, ADL training, ROM, and therapeutic exercises.  Aide to provide assistance with personal care, ADLs, and vital signs.    Activities:   activity as tolerated    Nursing:   Agency to admit patient within 24 hours of hospital discharge unless specified on physician order or at patient request    SN to complete comprehensive assessment including routine vital signs. Instruct on disease process and s/s of complications to report to MD. Review/verify medication list sent home with the patient at time of discharge  and instruct patient/caregiver as needed. Frequency may be adjusted depending on start of care date.     Skilled nurse to perform up to 3 visits PRN for symptoms related to diagnosis    Notify MD if SBP > 160 or < 90; DBP > 90 or < 50; HR > 120 or < 50; Temp > 101; O2 < 88%    Ok to schedule additional visits based on staff availability and patient request on consecutive days within the home health episode.    Miscellaneous   Diabetic Care:   SN to perform and educate Diabetic management with blood glucose monitoring:, Fingerstick blood sugar AC and HS, and Report CBG < 60 or > 350 to physician.    Home Health Aide:  Nursing Three times weekly, Physical Therapy Three times weekly, Occupational Therapy Three times weekly, and Home Health Aide Three times weekly    Wound Care  Orders    Scrotum- Irrigate with VASHE wound solution. Moisten gauze with VASHE and wring out excess liquid. Apply to wound bed tucking into all wound tunnels and undermining so that all aspects of the wound bed are covered. Add additional to lightly fill the wound bed (not tightly pack). Cover with telfa island dressing cut in half- then  Abd pads/ mesh underwear. Change per BID or if dressing comes loose or saturated.    I certify that this patient is confined to his home and needs intermittent skilled nursing care, physical therapy, and occupational therapy.

## 2023-01-12 DIAGNOSIS — N49.3 FOURNIER GANGRENE: Primary | ICD-10-CM

## 2023-01-12 LAB
POCT GLUCOSE: 147 MG/DL (ref 70–110)
POCT GLUCOSE: 175 MG/DL (ref 70–110)
POCT GLUCOSE: 219 MG/DL (ref 70–110)
POCT GLUCOSE: 282 MG/DL (ref 70–110)

## 2023-01-12 PROCEDURE — 97116 GAIT TRAINING THERAPY: CPT | Mod: CQ

## 2023-01-12 PROCEDURE — 25000003 PHARM REV CODE 250: Performed by: NURSE PRACTITIONER

## 2023-01-12 PROCEDURE — 97110 THERAPEUTIC EXERCISES: CPT | Mod: CO

## 2023-01-12 PROCEDURE — 25000003 PHARM REV CODE 250: Performed by: STUDENT IN AN ORGANIZED HEALTH CARE EDUCATION/TRAINING PROGRAM

## 2023-01-12 PROCEDURE — 97530 THERAPEUTIC ACTIVITIES: CPT | Mod: CO

## 2023-01-12 PROCEDURE — 97530 THERAPEUTIC ACTIVITIES: CPT | Mod: CQ

## 2023-01-12 PROCEDURE — 97110 THERAPEUTIC EXERCISES: CPT | Mod: CQ

## 2023-01-12 PROCEDURE — 63600175 PHARM REV CODE 636 W HCPCS: Performed by: STUDENT IN AN ORGANIZED HEALTH CARE EDUCATION/TRAINING PROGRAM

## 2023-01-12 PROCEDURE — 11000004 HC SNF PRIVATE

## 2023-01-12 RX ORDER — OXYCODONE HYDROCHLORIDE 10 MG/1
20 TABLET ORAL EVERY 4 HOURS PRN
Status: DISCONTINUED | OUTPATIENT
Start: 2023-01-12 | End: 2023-01-19 | Stop reason: HOSPADM

## 2023-01-12 RX ADMIN — SENNOSIDES AND DOCUSATE SODIUM 1 TABLET: 50; 8.6 TABLET ORAL at 09:01

## 2023-01-12 RX ADMIN — DIGOXIN 125 MCG: 125 TABLET ORAL at 09:01

## 2023-01-12 RX ADMIN — OXYCODONE HYDROCHLORIDE 20 MG: 10 TABLET ORAL at 02:01

## 2023-01-12 RX ADMIN — Medication 6 MG: at 09:01

## 2023-01-12 RX ADMIN — ZINC SULFATE 220 MG (50 MG) CAPSULE 220 MG: CAPSULE at 09:01

## 2023-01-12 RX ADMIN — DILTIAZEM HYDROCHLORIDE 120 MG: 120 CAPSULE, COATED, EXTENDED RELEASE ORAL at 09:01

## 2023-01-12 RX ADMIN — MUPIROCIN: 20 OINTMENT TOPICAL at 09:01

## 2023-01-12 RX ADMIN — INSULIN ASPART 14 UNITS: 100 INJECTION, SOLUTION INTRAVENOUS; SUBCUTANEOUS at 08:01

## 2023-01-12 RX ADMIN — METOPROLOL TARTRATE 50 MG: 50 TABLET, FILM COATED ORAL at 09:01

## 2023-01-12 RX ADMIN — OXYCODONE HYDROCHLORIDE 20 MG: 10 TABLET ORAL at 06:01

## 2023-01-12 RX ADMIN — ESCITALOPRAM OXALATE 20 MG: 10 TABLET ORAL at 09:01

## 2023-01-12 RX ADMIN — INSULIN ASPART 14 UNITS: 100 INJECTION, SOLUTION INTRAVENOUS; SUBCUTANEOUS at 12:01

## 2023-01-12 RX ADMIN — OXYCODONE HYDROCHLORIDE 20 MG: 10 TABLET ORAL at 09:01

## 2023-01-12 RX ADMIN — FLUTICASONE PROPIONATE 100 MCG: 50 SPRAY, METERED NASAL at 09:01

## 2023-01-12 RX ADMIN — PREDNISONE 20 MG: 20 TABLET ORAL at 09:01

## 2023-01-12 RX ADMIN — INSULIN DETEMIR 44 UNITS: 100 INJECTION, SOLUTION SUBCUTANEOUS at 09:01

## 2023-01-12 RX ADMIN — THERA TABS 1 TABLET: TAB at 09:01

## 2023-01-12 RX ADMIN — DICLOFENAC 4 G: 10 GEL TOPICAL at 09:01

## 2023-01-12 RX ADMIN — ASPIRIN 81 MG 81 MG: 81 TABLET ORAL at 09:01

## 2023-01-12 RX ADMIN — OXYCODONE HYDROCHLORIDE 20 MG: 10 TABLET ORAL at 05:01

## 2023-01-12 RX ADMIN — FOLIC ACID 1 MG: 1 TABLET ORAL at 09:01

## 2023-01-12 RX ADMIN — INSULIN DETEMIR 5 UNITS: 100 INJECTION, SOLUTION SUBCUTANEOUS at 09:01

## 2023-01-12 RX ADMIN — OXYCODONE HYDROCHLORIDE AND ACETAMINOPHEN 500 MG: 500 TABLET ORAL at 09:01

## 2023-01-12 RX ADMIN — INSULIN ASPART 4 UNITS: 100 INJECTION, SOLUTION INTRAVENOUS; SUBCUTANEOUS at 12:01

## 2023-01-12 RX ADMIN — OXYCODONE HYDROCHLORIDE 20 MG: 10 TABLET ORAL at 10:01

## 2023-01-12 RX ADMIN — ERYTHROMYCIN: 5 OINTMENT OPHTHALMIC at 09:01

## 2023-01-12 RX ADMIN — FAMOTIDINE 20 MG: 20 TABLET ORAL at 09:01

## 2023-01-12 RX ADMIN — LIDOCAINE 1 PATCH: 50 PATCH CUTANEOUS at 01:01

## 2023-01-12 RX ADMIN — CETIRIZINE HYDROCHLORIDE 10 MG: 5 TABLET, FILM COATED ORAL at 09:01

## 2023-01-12 RX ADMIN — Medication 1 CAPSULE: at 09:01

## 2023-01-12 RX ADMIN — INSULIN ASPART 14 UNITS: 100 INJECTION, SOLUTION INTRAVENOUS; SUBCUTANEOUS at 04:01

## 2023-01-12 RX ADMIN — EZETIMIBE 10 MG: 10 TABLET ORAL at 09:01

## 2023-01-12 NOTE — PT/OT/SLP PROGRESS
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  He presents with limitations in performance of self-care, functional mobility, and ADLs. Performance deficits affecting function are weakness, impaired endurance, impaired sensation, impaired self care skills, impaired functional mobility, gait instability, impaired balance, decreased coordination, decreased upper extremity function, decreased safety awareness, pain, decreased ROM, impaired cardiopulmonary response to activity. Pt tolerated Tx without incident, however requires v/c for safety due to impulsivity. Pt is making progress but continues to require assist to perform self care tasks, functional mobility and functional transfers. Pt would continue to benefit from OT intervention to further functional (I)ce and safety.    Rehab Potential is good    Activity tolerance:  Good    Plan:     Patient to be seen 5 x/week to address the above listed problems via self-care/home management, therapeutic activities, therapeutic exercises    Plan of Care Expires: 01/31/23  Plan of Care Reviewed with: patient    Subjective     Communicated with: Nursing prior to session.    Pain/Comfort:  Pain Rating 1: 8/10  Location - Side 1: Left  Location - Orientation 1: generalized  Location 1: elbow  Pain Addressed 1: Pre-medicate for activity, Distraction, Cessation of Activity  Pain Rating  Post-Intervention 1: 6/10  Pain Rating 2: 8/10  Location - Side 2: Right  Location - Orientation 2: generalized  Location 2: thigh  Pain Addressed 2: Pre-medicate for activity, Distraction  Pain Rating Post-Intervention 2: 8/10    Patient's cultural, spiritual, Oriental orthodox conflicts given the current situation:  yes    Objective:     Patient found sitting edge of bed with  (no active lines) upon OT entry to room.    Bed Mobility:    Pt seated EOB at onset of treatment session.     Functional Mobility/Transfers:  Patient completed Sit <> Stand Transfer with supervision  with  no assistive device and rolling walker with v/c for safety to lock w/c breaks  Patient completed Bed <> Chair Transfer using Stand Pivot with supervision and Chair <> Bed Step Transfer technique with stand by assistance with no assistive device  Functional Mobility: Pt propelled W/C from room to therapy gym with sup for approximately 130 ft using BUE to propel chair.    [facility] 6 Click ADL: 21    OT Exercises: Pt performed UE exercise with RUE through functional ROM with 3 lb dumbbell with repetition of 15x3. LUE not completed secondary to pain.  UE exercises performed to increase functional endurance and strength in order increase independence when performing self care tasks, functional ambulation, W/C propulsion, and functional standing activities.    Treatment & Education:  Pt participated in gross motor dynamic standing activity with CGA and RW with large exercise ball. Pt also participated in reaching activity while ambulating with SBA and RW with v/c for safety with RW. Activities with focus on standing tolerance, functional reaching, dynamic standing bal, crossing midline, and to promote independence with homemaking and self care tasks.     Pt educated on role of OT, safety while performing functional transfers, safety while performing self care tasks, orthopedic precautions, importance to adhering to precautions and progress towards OT  goals.    Patient left sitting edge of bed with call button in reach    GOALS:   Multidisciplinary Problems       Occupational Therapy Goals          Problem: Occupational Therapy    Goal Priority Disciplines Outcome Interventions   Occupational Therapy Goal     OT, PT/OT Ongoing, Progressing    Description: Goals to be met by: 1/30/23     Patient will increase functional independence with ADLs by performing:    LE Dressing with Modified [place].  Grooming while standing at sink with Supervision.  Toileting from toilet with Supervision for hygiene and clothing management.   Bathing from  shower chair/bench with Supervision.  Step transfer with Modified [place]  Upper extremity exercise program, with [place].   Caregiver will be educated on level of assistance required to safely perform self care and functional transfers.                         Time Tracking:     OT Date of Treatment: 01/12/23  OT Start Time: 1315    OT Stop Time: 1353  OT Total Time (min): 38 min    Billable Minutes:Therapeutic Activity 28  Therapeutic Exercise 10    1/12/2023

## 2023-01-12 NOTE — PROGRESS NOTES
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    History:     Past Medical History:   Diagnosis Date    Arthritis     Atrial myxoma     CHF (congestive heart failure)     Coronary atherosclerosis     Diabetes mellitus, type 2     Difficult intubation     Hepatitis B     Hyperlipidemia     Hypertension     Non-alcoholic fatty liver disease     Rheumatoid arthritis     Rheumatoid arthritis flare 07/12/2021    Stroke     TIA    Systolic heart failure        Social History     Socioeconomic History    Marital status: Single   Occupational History    Occupation: , respiratory therapist, founded [employer]     Comment: Retired   Tobacco Use    Smoking status: Some Days     Packs/day: 1.00     Years: 35.00     Pack years: 35.00     Types: Cigarettes    Smokeless tobacco: Never   Substance and Sexual Activity    Alcohol use: Not Currently    Drug use: No    Sexual activity: Never     Social Determinants of Health     Financial Resource Strain: High Risk    Difficulty of Paying Living Expenses: Hard   Food Insecurity: No Food Insecurity    Worried About Running Out of Food in the Last Year: Never true    Ran Out of Food in the Last Year: Never true   Transportation Needs: Unmet Transportation Needs    Lack of Transportation (Medical): Yes    Lack of Transportation (Non-Medical): Yes   Physical Activity: Inactive    Days of Exercise per Week: 0 days    Minutes of Exercise per Session: 0 min   Stress: Stress Concern Present    Feeling of Stress : To some extent   Social Connections: Socially Isolated    Frequency of Communication with Friends and Family: More than three times a week    Frequency of Social Gatherings with Friends and Family: More than three times a week    Attends Jewish Services: Never    Active Member of Clubs or Organizations: No    Attends Club or Organization Meetings: Never    Marital  Status: Never    Housing Stability: High Risk    Unable to Pay for Housing in the Last Year: Yes    Number of Places Lived in the Last Year: 1    Unstable Housing in the Last Year: No       Precautions:     Allergies as of 12/30/2022 - Reviewed 12/01/2022   Allergen Reaction Noted    Enbrel [etanercept] Shortness Of Breath 07/12/2021    Nsaids (non-steroidal anti-inflammatory drug) Other (See Comments) 06/29/2015    Statins-hmg-coa reductase inhibitors Other (See Comments) 06/29/2015    Pcn [penicillins] Rash 06/29/2015       WO Assessment Details/Treatment   Wound care follow-up  The left scrotum surgical incision has decreased in size, pink moist wound bed with creamy tan coating on distal wound bed. The wound edges are pink/dry with epithelization.    Mr. Wagner discussed his discharge date being changed and expressed his phobia of treating his own wounds, becoming dizzy and sometimes passing out when looking at his wounds.  He is not confident he will be able to care for his wound at home.  He expressed limitations to wound care:  with his arms/hands due to arthritis which makes opening/placing dressings over a wound difficult at times. His bedroom is upstairs at the end of a hallway- he is not able to walk up stairs or long distances by himself, nearest family is over 200 miles away, no close friends to assist with wound care, finances are also a problem.     We discussed treatment options that may be performed for wound healing, he remains hesitant to try them alone.      Plan:  Change wound care - allow Mr. Wagner to perform his own care with assist.   Left scrotum- After shower, pour VASHE wound solution over left scrotal wound, place xeroform dressing on sanitary napkin, adhere napkin to left side of briefs then  pull up briefs daily/prn.  Refer to home health  Nursing to continue care, pressure prevention measures.  Wound care will follow-up.    Recommendations made to primary team for above plan per  written report . Orders placed.      01/12/23 0745        Incision/Site 11/17/22 1639 Left Scrotum lateral vertical   Date First Assessed/Time First Assessed: 11/17/22 1639   Side: Left  Location: Scrotum  Orientation: lateral  Incision Type: vertical   Wound Image    Incision WDL ex   Dressing Appearance Intact;Clean;Moist drainage   Drainage Amount Scant   Drainage Characteristics/Odor Tan;Creamy   Appearance Pink;Moist;Epithelialization   Red (%), Wound Tissue Color 100 %   Periwound Area Intact;Dry;Pink   Wound Edges Open   Wound Length (cm) 6 cm   Wound Width (cm) 2.4 cm   Wound Depth (cm) 0.2 cm   Wound Volume (cm^3) 2.88 cm^3   Wound Surface Area (cm^2) 14.4 cm^2   Care Cleansed with:;Antimicrobial agent   Dressing Changed;Gauze, wet to moist;Island/border   Dressing Change Due 01/12/23 01/12/2023

## 2023-01-12 NOTE — PROGRESS NOTES
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 male with PMH of DM2, CAD, HTN, and HLD who presents to [facility] for treatment of sepsis and debility. Patient presented to urgent care with complaints of scrotal pain, redness and fever. He was advised to present to ED. Patient presented to the ED the next day. In ED he reported, fevers and chills at home, nausea, decreased PO intake, and multiple syncopal episodes with +incontinence of stool. 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  Empiric initiated with cefepime, clindamycin, metronidazole, and vancomycin.  Urology consulted and he underwent emergent excision and debridement of necrotic tissue on 11/15. Repeat washout performed 11/17. Improved and stepped down from ICU 11/18.  Bedside debridement performed on 11/22.  ID consulted and antibiotics regimen changed to cefepime and Flagyl known.  Wound cultures with Anaerococcus and Prevotella species.  Renal function normalized.  Return of SVT and Cardiology consulted with start of metoprolol. Patient deemed medically stable. Transferred to [facility] on 12/1/22. He completed course of cefepime and flagyl for treatment of kami's gangrene. Discharged to [facility] on 12/31/22.  Today, he has multiple concerns. He would like to make sure he has follow ups scheduled with cardiology, infectious disease, and rheumatology. Also reporting occasional dizziness. He refused his lasix this morning. Reports poor po intake and loss of 50 pounds over the past few months. Will hold lasix for now. He also is requesting to  stop his prednisone, will discontinue. Reports having some sinus congestion and pressure in his ears. Currently on course of zyrtec.      Interval history:  All of today's labs reviewed and are listed below.  Na improved to 137. 24 hr vital sign ranges listed below.  Patient states he feels great today.  Patient denies shortness of breath, abdominal discomfort, nausea, or vomiting.  Patient reports an adequate appetite.  Patient denies dysuria.  Patient reports having regular bowel movements.  Patient progessing with PT/OT. Continuing to follow and treat all acute and chronic conditions.      Past Medical History: Patient has a past medical history of Arthritis, Atrial myxoma, CHF (congestive heart failure), Coronary atherosclerosis, Diabetes mellitus, type 2, Difficult intubation, Hepatitis B, Hyperlipidemia, Hypertension, Non-alcoholic fatty liver disease, Rheumatoid arthritis, Rheumatoid arthritis flare (07/12/2021), Stroke, and Systolic heart failure.    Past Surgical History: Patient has a past surgical history that includes Pleura biopsy; Resection of atrial myxoma (2007); Lung lobectomy (Right, 2008); Coronary angiography (N/A, 3/10/2021); Coronary stent placement (03/10/2021); Incision and drainage of scrotum (N/A, 11/15/2022); and Incision and drainage of scrotum (N/A, 11/17/2022).    Social History: Patient reports that he has been smoking cigarettes. He has a 35.00 pack-year smoking history. He has never used smokeless tobacco. He reports that he does not currently use alcohol. He reports that he does not use drugs.    Family History: family history includes Cancer in his sister; Diabetes type I in his father; Diabetes type II in his mother; Hodgkin's lymphoma in his mother; Kidney failure in his father.    Allergies: Patient is allergic to enbrel [etanercept], nsaids (non-steroidal anti-inflammatory drug), statins-hmg-coa reductase inhibitors, and pcn [penicillins].    ROS  Constitutional: Negative for  fever   Eyes: Negative for blurred vision, double vision +left eye conjunctivitis   Respiratory: Negative for cough, shortness of breath.  +nasal congestion   Cardiovascular: Negative for chest pain, palpitations, and leg swelling.   Gastrointestinal: Negative for abdominal pain, constipation, diarrhea, nausea, vomiting.   Genitourinary: Negative for dysuria, frequency   Musculoskeletal:  + generalized weakness. Negative for back pain and myalgias.   Skin: Negative for itching and rash.   Neurological: Negative for dizziness, headaches.   Psychiatric/Behavioral: Negative for depression. The patient is not nervous/anxious.      24 hour Vital Sign Range   Temp:  [97 °F (36.1 °C)-97.4 °F (36.3 °C)]   Pulse:  [74-84]   Resp:  [16-18]   BP: (128-132)/(67-70)   SpO2:  [96 %-98 %]     Current BMI: Body mass index is 28.69 kg/m².    PEx  Constitutional: Patient appears debilitated.  No distress noted  HENT:   Head: Normocephalic and atraumatic.   Eyes: Pupils are equal, round  Neck: Normal range of motion. Neck supple.   Cardiovascular: Normal rate, regular rhythm and normal heart sounds.    Pulmonary/Chest: Effort normal and breath sounds are clear  Abdominal: Soft. Bowel sounds are normal.   Musculoskeletal: Normal range of motion.   Neurological: Alert and oriented to person, place, and time.   Psychiatric: Normal mood and affect. Behavior is normal.   Skin: Skin is warm and dry.  Scrotal wound     Incision/Site 11/17/22 1639 Left Scrotum lateral vertical     Side: Left  Location: Scrotum  Orientation: lateral  Incision Type: vertical   Incision WDL ex   Dressing Appearance Intact;Clean;Moist drainage   Drainage Amount Scant   Drainage Characteristics/Odor Tan;Creamy   Appearance Pink;Moist;Epithelialization   Red (%), Wound Tissue Color 100 %   Periwound Area Intact;Dry;Pink   Wound Edges Open   Wound Length (cm) 6 cm   Wound Width (cm) 2.4 cm   Wound Depth (cm) 0.2 cm   Wound Volume (cm^3) 2.88 cm^3   Wound Surface  Area (cm^2) 14.4 cm^2     No results for input(s): GLUCOSE, NA, K, CL, CO2, BUN, CREATININE, MG in the last 24 hours.    Invalid input(s):  CALCIUM      No results for input(s): WBC, RBC, HGB, HCT, PLT, MCV, MCH, MCHC in the last 24 hours.        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 Moisten gauze with VASHE and wring out excess liquid. Apply to wound bed tucking into all wound tunnels and undermining so that all aspects of the wound bed are covered. Add additional to lightly fill the wound bed (not tightly pack). Cover with telfa island dressing cut in half- then  Abd pads/ mesh underwear. Change per BID or if dressing comes loose or saturated.  Wound care nurse following  1/6-Reports having an area of drainage around his perineum. Assessed area on exam today, no redness or drainage noted. No fever or leukocytosis. doxycycline 100mg bid as patient will be high risk for infection with the resumption of steroids.- end date 5 days  - 1/12 change oxycodone IR to 15 or 20 mg q.4 hours PRN.  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 Moisten gauze with VASHE and wring out excess liquid. Apply to wound bed tucking into all wound tunnels and undermining so that all aspects of the wound bed are covered. Add additional to lightly fill the wound bed (not tightly pack). Cover with telfa island dressing cut in half- then  Abd pads/ mesh underwear. Change per BID or if dressing comes loose or saturated.  Wound care nurse following  1/6-Reports having an area of drainage  around his perineum. Assessed area on exam today, no redness or drainage noted. No fever or leukocytosis.  doxycycline 100mg bid as patient will be high risk for infection with the resumption of steroids.- end date 5 days     Rheumatoid Arthritis  1/6-Elevated inflammatory markers, sed rate 78, CRP 19.7, CCP antibodies 511, rheumatoid factor 914.  Discussed resuming prednisone. Patient agreeable to resume prednisone. Discussed with patient's rheumatologist, recommended started prednisone 20mg x 7 days, then 10mg x 7 days, then going down to 5mg    Moderate Protein-Calorie Malnutrition  Related to (etiology):  Inability to meet physiological needs PO in the presence of sepsis     Signs and Symptoms (as evidenced by):  Energy Intake: <75% of estimated energy requirement for > one month     Muscle Mass Depletion: mild depletion of clavicle region and interosseous muscle , moderate loss to hands and thighs  Weight Loss: 18%x < one month   Regular diet  Commercial beverage(calories,protein) boost plus TID chocolate  Vitamin/mineral supplement therapy- MVI  Initiate order for zinc sulfate daily x14 days, ascorbic acid 500 mg x 30 days, and probiotic daily per RD recommendations     Type 2 Diabetes mellitus with hyperglycemia, with long-term current use of insulin  Continue insulin aspart 14 units tid with meals  Insulin detemir 44 units daily  Insulin detemir 5 units nightly  Accuchecks ac and hs  SSI     Coronary artery disease  Continue asa daily     Chronic diastolic heart failure  Continue digoxin 125mcg daily  Continue diltiazem 120mg bid  Continue metoprolol 50 mg bid  Holding lasix due to poor po intake/ intermittent dizziness     Hypokalemia  On Kcl 40mEq daily  May need to d/c now that we are hold lasix, monitor closely     Conjunctivitis  Continue gentamicin drops tid     Otitis media with effusion     Hyperlipidemia  Continue zetia 10mg daily     Dysphoric mood  Continue lexapro 20mg daily        Anticipate  disposition:    Home with home health          Anticipate disposition:  Home with home health      Follow-up needed during SNF stay-    Follow-up needed after discharge from SNF: PCP, ID     Future Appointments   Date Time Provider Department Center   1/18/2023  2:00 PM Shemar Dempsey MD [facility] [id number] [facility]   1/18/2023  3:00 PM Kevin Lares MD [facility] [id number] [facility]   1/18/2023  3:30 PM Lalitha Stroud RN, E [facility] [id number] [facility]   2/1/2023  4:00 PM Sylvie Dc MD [facility] ID [facility]   2/2/2023  3:00 PM Shemar Dempsey MD [facility] [id number] [facility]   2/17/2023  8:00 AM Korin Alcala MD [facility] RHEUM Devin Veliz, NP  Department of [facility]   [facility] Skilled Nursing Facility     DOS: 1/12/2023       Patient note was created using MModal Dictation.  Any errors in syntax or even information may not have been identified and edited on initial review prior to signing this note.

## 2023-01-12 NOTE — PROGRESS NOTES
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 male with PMH of DM2, CAD, HTN, and HLD who presents to [facility] for treatment of sepsis and debility. Patient presented to urgent care with complaints of scrotal pain, redness and fever. He was advised to present to ED. Patient presented to the ED the next day. In ED he reported, fevers and chills at home, nausea, decreased PO intake, and multiple syncopal episodes with +incontinence of stool. 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  Empiric initiated with cefepime, clindamycin, metronidazole, and vancomycin.  Urology consulted and he underwent emergent excision and debridement of necrotic tissue on 11/15. Repeat washout performed 11/17. Improved and stepped down from ICU 11/18.  Bedside debridement performed on 11/22.  ID consulted and antibiotics regimen changed to cefepime and Flagyl known.  Wound cultures with Anaerococcus and Prevotella species.  Renal function normalized.  Return of SVT and Cardiology consulted with start of metoprolol. Patient deemed medically stable. Transferred to [facility] on 12/1/22. He completed course of cefepime and flagyl for treatment of kami's gangrene. Discharged to [facility] on 12/31/22.  Today, he has multiple concerns. He would like to make sure he has follow ups scheduled with cardiology, infectious disease, and rheumatology. Also reporting occasional dizziness. He refused his lasix this morning. Reports poor po intake and loss of 50 pounds over the past few months. Will  hold lasix for now. He also is requesting to stop his prednisone, will discontinue. Reports having some sinus congestion and pressure in his ears. Currently on course of zyrtec.      Interval history:  24 hr vital sign ranges listed below.  Message sent to  to set the patient up for infectious disease follow-up.  Discussed with the patient reducing pain medication, will discontinue long-acting oxycodone.  Patient nervous about possibly discharging early due to BID dressing changes.  Patient denies shortness of breath, abdominal discomfort, nausea, or vomiting.  Patient reports an adequate appetite.  Patient denies dysuria.  Patient reports having regular bowel movements.  Patient progessing with PT/OT. Continuing to follow and treat all acute and chronic conditions.    Past Medical History: Patient has a past medical history of Arthritis, Atrial myxoma, CHF (congestive heart failure), Coronary atherosclerosis, Diabetes mellitus, type 2, Difficult intubation, Hepatitis B, Hyperlipidemia, Hypertension, Non-alcoholic fatty liver disease, Rheumatoid arthritis, Rheumatoid arthritis flare (07/12/2021), Stroke, and Systolic heart failure.    Past Surgical History: Patient has a past surgical history that includes Pleura biopsy; Resection of atrial myxoma (2007); Lung lobectomy (Right, 2008); Coronary angiography (N/A, 3/10/2021); Coronary stent placement (03/10/2021); Incision and drainage of scrotum (N/A, 11/15/2022); and Incision and drainage of scrotum (N/A, 11/17/2022).    Social History: Patient reports that he has been smoking cigarettes. He has a 35.00 pack-year smoking history. He has never used smokeless tobacco. He reports that he does not currently use alcohol. He reports that he does not use drugs.    Family History: family history includes Cancer in his sister; Diabetes type I in his father; Diabetes type II in his mother; Hodgkin's lymphoma in his mother; Kidney failure in his father.    Allergies: Patient  is allergic to enbrel [etanercept], nsaids (non-steroidal anti-inflammatory drug), statins-hmg-coa reductase inhibitors, and pcn [penicillins].    ROS  Constitutional: Negative for fever   Eyes: Negative for blurred vision, double vision +left eye conjunctivitis   Respiratory: Negative for cough, shortness of breath.  +nasal congestion   Cardiovascular: Negative for chest pain, palpitations, and leg swelling.   Gastrointestinal: Negative for abdominal pain, constipation, diarrhea, nausea, vomiting.   Genitourinary: Negative for dysuria, frequency   Musculoskeletal:  + generalized weakness. Negative for back pain and myalgias.   Skin: Negative for itching and rash.   Neurological: Negative for dizziness, headaches.   Psychiatric/Behavioral: Negative for depression. The patient is not nervous/anxious.      24 hour Vital Sign Range   Temp:  [97 °F (36.1 °C)-97.4 °F (36.3 °C)]   Pulse:  [74-84]   Resp:  [16-18]   BP: (128-132)/(67-70)   SpO2:  [96 %-98 %]     Current BMI: Body mass index is 28.69 kg/m².    PEx  Constitutional: Patient appears debilitated.  No distress noted  HENT:   Head: Normocephalic and atraumatic.   Eyes: Pupils are equal, round  Neck: Normal range of motion. Neck supple.   Cardiovascular: Normal rate, regular rhythm and normal heart sounds.    Pulmonary/Chest: Effort normal and breath sounds are clear  Abdominal: Soft. Bowel sounds are normal.   Musculoskeletal: Normal range of motion.   Neurological: Alert and oriented to person, place, and time.   Psychiatric: Normal mood and affect. Behavior is normal.   Skin: Skin is warm and dry.  Scrotal wound             Incision/Site 11/17/22 1639 Left Scrotum lateral vertical   Date First Assessed/Time First Assessed: 11/17/22 1639   Side: Left  Location: Scrotum  Orientation: lateral  Incision Type: vertical   Incision WDL ex   Dressing Appearance Open to air   Drainage Amount None   Drainage Characteristics/Odor No odor   Appearance  Pink;Moist;Epithelialization;Granulating   Red (%), Wound Tissue Color 100 %   Periwound Area Intact;Dry;Pink   Wound Edges Open   Wound Length (cm) 6 cm   Wound Width (cm) 3.7 cm   Wound Depth (cm) 0.2 cm   Wound Volume (cm^3) 4.44 cm^3   Wound Surface Area (cm^2) 22.2 cm^2   Tunneling (depth (cm)/location) 0   Undermining (depth (cm)/location) 0       No results for input(s): GLUCOSE, NA, K, CL, CO2, BUN, CREATININE, MG in the last 24 hours.    Invalid input(s):  CALCIUM      No results for input(s): WBC, RBC, HGB, HCT, PLT, MCV, MCH, MCHC in the last 24 hours.        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 Moisten gauze with VASHE and wring out excess liquid. Apply to wound bed tucking into all wound tunnels and undermining so that all aspects of the wound bed are covered. Add additional to lightly fill the wound bed (not tightly pack). Cover with telfa island dressing cut in half- then  Abd pads/ mesh underwear. Change per BID or if dressing comes loose or saturated.  Wound care nurse following  1/6-Reports having an area of drainage around his perineum. Assessed area on exam today, no redness or drainage noted. No fever or leukocytosis. doxycycline 100mg bid as patient will be high risk for infection with the resumption of steroids.- end date 5 days  - discontinue Oxy ER, increase oxycodone IR to 20 mg q.4 hours PRN.  Message sent to  to  schedule for ID follow-up    Nasal congestion  - Flonase daily, continue cetirizine daily    Conjunctivitis  - erythromycin ointment daily     Rheumatoid Arthritis  1/6-Elevated inflammatory markers, sed rate 78, CRP 19.7, CCP antibodies 511, rheumatoid factor 914.  Discussed resuming prednisone. Patient agreeable to resume prednisone. Discussed with patient's rheumatologist, recommended  started prednisone 20mg x 7 days, then 10mg x 7 days, then going down to 5mg    Moderate Protein-Calorie Malnutrition  Related to (etiology):  Inability to meet physiological needs PO in the presence of sepsis     Signs and Symptoms (as evidenced by):  Energy Intake: <75% of estimated energy requirement for > one month     Muscle Mass Depletion: mild depletion of clavicle region and interosseous muscle , moderate loss to hands and thighs  Weight Loss: 18%x < one month   Regular diet  Commercial beverage(calories,protein) boost plus TID chocolate  Vitamin/mineral supplement therapy- MVI  Initiate order for zinc sulfate daily x14 days, ascorbic acid 500 mg x 30 days, and probiotic daily per RD recommendations     Type 2 Diabetes mellitus with hyperglycemia, with long-term current use of insulin  Continue insulin aspart 14 units tid with meals  Insulin detemir 44 units daily  Insulin detemir 5 units nightly  Accuchecks ac and hs  SSI     Coronary artery disease  Continue asa daily     Chronic diastolic heart failure  Continue digoxin 125mcg daily  Continue diltiazem 120mg bid  Continue metoprolol 50 mg bid  Holding lasix due to poor po intake/ intermittent dizziness    Hyperlipidemia  Continue zetia 10mg daily     Dysphoric mood  Continue lexapro 20mg daily        Anticipate disposition:    Home with home health          Anticipate disposition:  Home with home health      Follow-up needed during SNF stay-    Follow-up needed after discharge from SNF: PCP, ID 2/1    Future Appointments   Date Time Provider Department Center   1/18/2023  2:00 PM Shemar Dempsey MD [facility] [id number] [facility]   1/18/2023  3:00 PM Kevin Lares MD [facility] [id number] [facility]   1/18/2023  3:30 PM Lalitha Stroud RN, CDE [facility] [id number] [facility]   2/1/2023  4:00 PM Sylvie Dc MD [facility] ID [facility]   2/2/2023  3:00 PM Shemar Dempsey MD [facility] [id number] [facility]   2/17/2023  8:00 AM Korin Alcala MD [facility] RHEUM [place] [place]          Wandy Veliz NP  Department of Hospital Medicine   [facility]     DOS: 1/11/2023       Patient note was created using MModal Dictation.  Any errors in syntax or even information may not have been identified and edited on initial review prior to signing this note.

## 2023-01-12 NOTE — PT/OT/SLP PROGRESS
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     Pt was agreeable and tolerated session well. Pt completed multiple trials of stair training with occasional rest breaks. Pt also complete gait training with noAD, no major LOB noted. Pt continues to benefit from therpay to improve functional endurance, strength, and mobility.     Performance deficits affecting function: weakness, impaired endurance, impaired functional mobility, gait instability, impaired balance, decreased lower extremity function, decreased safety awareness, pain, impaired cardiopulmonary response to activity.    Rehab Potential is good    Activity Tolerance: Good    Plan:     Patient to be seen 6 x/week to address the above listed problems via gait training, therapeutic activities, therapeutic exercises, neuromuscular re-education    Plan of Care Expires: 02/01/23  Plan of Care Reviewed with: patient    Subjective     "I want to improve by 10% each week" and "the walker is something else I need to keep track of"     Pain/Comfort:  Pain Rating 1: 6/10  Location - Side 1: Left  Location - Orientation 1: generalized  Location 1: elbow  Pain Addressed 1: Reposition, Distraction  Pain Rating Post-Intervention 1: 6/10    Patient's cultural, spiritual, Gnosticist conflicts given the current situation:  no    Objective:     Patient found sitting edge of bed with  (no lines) upon " PT entry to room.     Therapeutic Activities and Exercises:   Seated Hamstring stretch on theraball.   Standing BLE stretch: quad str  BUE on outside of parallel bars for safety   Patient educated on role of therapy, goals of session, and benefits of out of bed mobility.   Instructed on use of call button and importance of calling nursing staff for assistance with mobility   Questions/concerns addressed within PTA scope of practice  Pt verbalized understanding.    Vitals: BP (MAP), no reports of dizziness or lightheadedness during session  Seated: 124/67 (90)  Post Gait+1st stair trial: 126/61 (87)  Post 2nd stair trial: 124/64 (88)  2nd gait: standing 110/70 (87)  End of session: 121/64 (90)    Functional Mobility:  Bed Mobility:   Sit to Supine: supervision with HOB flat   Transfers:   Sit <> Stand Transfer: stand by assistance with rolling walker   Wheelchair > Chair Transfer: stand by assistance with noAD using step transfer (~10ft)  Gait:  Pt ambulated ~50+110ft with  closeSBA  and no assistive device and ~100ft with closeSBA and pushing wheelchair . Wheelchair following  Gait Pattern observed: reciprocal gait  Gait Deviation(s): mostly steady gait with good gilberto and step length. Noted slight lateral drifting when ambulating with no AD  Verbal/tactile cues for pacing and posture   No major LOB noted.   Stair training:  Pt ascended and descended 4 steps with BHR and CGA-close SBA  1st trial: completed 1 set of 4 step following gait   2nd trial: completed 3 set of 4 step, standing rest break in-between  Pt initially started with step-to gait, then advanced to reciprocal gait for 2 set of 2nd trial   Wheelchair Propulsion:    Pt propelled Standard wheelchair x ~80 feet on Level tile with  Bilateral upper extremity with Supervision or Set-up Assistance.   Distance limited by RUE pain/fatigued.     AM-PAC 6 CLICK MOBILITY  21    Patient left supine with call button in reach.    GOALS:   Multidisciplinary Problems        Physical Therapy Goals          Problem: Physical Therapy    Goal Priority Disciplines Outcome Goal Variances Interventions   Physical Therapy Goal     PT, PT/OT Ongoing, Progressing     Description: Goals to be met by: 2023     Patient will increase functional independence with mobility by performin. Sit to stand transfer with Modified [place]  2. Bed to chair transfer with Modified [place] using No Assistive Device  3. Gait  x 150 feet with Modified [place] using rolling walker or cane.   4. Wheelchair propulsion x150 feet with Modified [place]   5. Stand for 10 minutes with [place]  New Goal: 23 Ascend/descend 12 steps with B HR and Supervision                         Time Tracking:     PT Received On: 23  PT Start Time: 1535  PT Stop Time: 1616  PT Total Time (min): 41 min    Billable Minutes: Gait Training 15, Therapeutic Activity 14, and Therapeutic Exercise 12    Treatment Type: Treatment  PT/PTA: PTA     PTA Visit Number: 3     2023

## 2023-01-13 LAB
ANION GAP SERPL CALC-SCNC: 8 MMOL/L (ref 8–16)
BASOPHILS # BLD AUTO: 0.08 K/UL (ref 0–0.2)
BASOPHILS NFR BLD: 1.1 % (ref 0–1.9)
BUN SERPL-MCNC: 15 MG/DL (ref 8–23)
CALCIUM SERPL-MCNC: 8.9 MG/DL (ref 8.7–10.5)
CHLORIDE SERPL-SCNC: 105 MMOL/L (ref 95–110)
CO2 SERPL-SCNC: 25 MMOL/L (ref 23–29)
CREAT SERPL-MCNC: 0.8 MG/DL (ref 0.5–1.4)
DIFFERENTIAL METHOD: ABNORMAL
EOSINOPHIL # BLD AUTO: 0.3 K/UL (ref 0–0.5)
EOSINOPHIL NFR BLD: 3.9 % (ref 0–8)
ERYTHROCYTE [DISTWIDTH] IN BLOOD BY AUTOMATED COUNT: 12 % (ref 11.5–14.5)
EST. GFR  (NO RACE VARIABLE): >60 ML/MIN/1.73 M^2
GLUCOSE SERPL-MCNC: 149 MG/DL (ref 70–110)
HCT VFR BLD AUTO: 37.5 % (ref 40–54)
HGB BLD-MCNC: 12.2 G/DL (ref 14–18)
IMM GRANULOCYTES # BLD AUTO: 0.05 K/UL (ref 0–0.04)
IMM GRANULOCYTES NFR BLD AUTO: 0.7 % (ref 0–0.5)
LYMPHOCYTES # BLD AUTO: 2.9 K/UL (ref 1–4.8)
LYMPHOCYTES NFR BLD: 40.2 % (ref 18–48)
MAGNESIUM SERPL-MCNC: 2 MG/DL (ref 1.6–2.6)
MCH RBC QN AUTO: 31.4 PG (ref 27–31)
MCHC RBC AUTO-ENTMCNC: 32.5 G/DL (ref 32–36)
MCV RBC AUTO: 97 FL (ref 82–98)
MONOCYTES # BLD AUTO: 0.7 K/UL (ref 0.3–1)
MONOCYTES NFR BLD: 10.3 % (ref 4–15)
NEUTROPHILS # BLD AUTO: 3.1 K/UL (ref 1.8–7.7)
NEUTROPHILS NFR BLD: 43.8 % (ref 38–73)
NRBC BLD-RTO: 0 /100 WBC
PHOSPHATE SERPL-MCNC: 4.3 MG/DL (ref 2.7–4.5)
PLATELET # BLD AUTO: 288 K/UL (ref 150–450)
PMV BLD AUTO: 12 FL (ref 9.2–12.9)
POCT GLUCOSE: 120 MG/DL (ref 70–110)
POCT GLUCOSE: 142 MG/DL (ref 70–110)
POCT GLUCOSE: 153 MG/DL (ref 70–110)
POCT GLUCOSE: 155 MG/DL (ref 70–110)
POTASSIUM SERPL-SCNC: 3.9 MMOL/L (ref 3.5–5.1)
RBC # BLD AUTO: 3.88 M/UL (ref 4.6–6.2)
SARS-COV-2 RNA RESP QL NAA+PROBE: NOT DETECTED
SODIUM SERPL-SCNC: 138 MMOL/L (ref 136–145)
WBC # BLD AUTO: 7.09 K/UL (ref 3.9–12.7)

## 2023-01-13 PROCEDURE — 25000003 PHARM REV CODE 250: Performed by: NURSE PRACTITIONER

## 2023-01-13 PROCEDURE — 11000004 HC SNF PRIVATE

## 2023-01-13 PROCEDURE — 97110 THERAPEUTIC EXERCISES: CPT

## 2023-01-13 PROCEDURE — U0005 INFEC AGEN DETEC AMPLI PROBE: HCPCS | Performed by: HOSPITALIST

## 2023-01-13 PROCEDURE — 80048 BASIC METABOLIC PNL TOTAL CA: CPT | Performed by: NURSE PRACTITIONER

## 2023-01-13 PROCEDURE — 97530 THERAPEUTIC ACTIVITIES: CPT

## 2023-01-13 PROCEDURE — 97116 GAIT TRAINING THERAPY: CPT

## 2023-01-13 PROCEDURE — 83735 ASSAY OF MAGNESIUM: CPT | Performed by: NURSE PRACTITIONER

## 2023-01-13 PROCEDURE — 63600175 PHARM REV CODE 636 W HCPCS: Performed by: STUDENT IN AN ORGANIZED HEALTH CARE EDUCATION/TRAINING PROGRAM

## 2023-01-13 PROCEDURE — 36415 COLL VENOUS BLD VENIPUNCTURE: CPT | Performed by: NURSE PRACTITIONER

## 2023-01-13 PROCEDURE — 84100 ASSAY OF PHOSPHORUS: CPT | Performed by: NURSE PRACTITIONER

## 2023-01-13 PROCEDURE — 25000003 PHARM REV CODE 250: Performed by: STUDENT IN AN ORGANIZED HEALTH CARE EDUCATION/TRAINING PROGRAM

## 2023-01-13 PROCEDURE — 85025 COMPLETE CBC W/AUTO DIFF WBC: CPT | Performed by: NURSE PRACTITIONER

## 2023-01-13 RX ADMIN — DILTIAZEM HYDROCHLORIDE 120 MG: 120 CAPSULE, COATED, EXTENDED RELEASE ORAL at 09:01

## 2023-01-13 RX ADMIN — METOPROLOL TARTRATE 50 MG: 50 TABLET, FILM COATED ORAL at 09:01

## 2023-01-13 RX ADMIN — INSULIN DETEMIR 44 UNITS: 100 INJECTION, SOLUTION SUBCUTANEOUS at 08:01

## 2023-01-13 RX ADMIN — LINACLOTIDE 72 MCG: 72 CAPSULE, GELATIN COATED ORAL at 02:01

## 2023-01-13 RX ADMIN — INSULIN ASPART 14 UNITS: 100 INJECTION, SOLUTION INTRAVENOUS; SUBCUTANEOUS at 12:01

## 2023-01-13 RX ADMIN — DIGOXIN 125 MCG: 125 TABLET ORAL at 09:01

## 2023-01-13 RX ADMIN — DICLOFENAC 4 G: 10 GEL TOPICAL at 09:01

## 2023-01-13 RX ADMIN — INSULIN ASPART 14 UNITS: 100 INJECTION, SOLUTION INTRAVENOUS; SUBCUTANEOUS at 08:01

## 2023-01-13 RX ADMIN — SENNOSIDES AND DOCUSATE SODIUM 1 TABLET: 50; 8.6 TABLET ORAL at 09:01

## 2023-01-13 RX ADMIN — LIDOCAINE 1 PATCH: 50 PATCH CUTANEOUS at 12:01

## 2023-01-13 RX ADMIN — ESCITALOPRAM OXALATE 20 MG: 10 TABLET ORAL at 09:01

## 2023-01-13 RX ADMIN — FAMOTIDINE 20 MG: 20 TABLET ORAL at 09:01

## 2023-01-13 RX ADMIN — OXYCODONE HYDROCHLORIDE 20 MG: 10 TABLET ORAL at 04:01

## 2023-01-13 RX ADMIN — OXYCODONE HYDROCHLORIDE AND ACETAMINOPHEN 500 MG: 500 TABLET ORAL at 09:01

## 2023-01-13 RX ADMIN — MUPIROCIN: 20 OINTMENT TOPICAL at 10:01

## 2023-01-13 RX ADMIN — INSULIN DETEMIR 5 UNITS: 100 INJECTION, SOLUTION SUBCUTANEOUS at 09:01

## 2023-01-13 RX ADMIN — ASPIRIN 81 MG 81 MG: 81 TABLET ORAL at 09:01

## 2023-01-13 RX ADMIN — FLUTICASONE PROPIONATE 100 MCG: 50 SPRAY, METERED NASAL at 09:01

## 2023-01-13 RX ADMIN — Medication 1 CAPSULE: at 08:01

## 2023-01-13 RX ADMIN — MUPIROCIN: 20 OINTMENT TOPICAL at 09:01

## 2023-01-13 RX ADMIN — THERA TABS 1 TABLET: TAB at 09:01

## 2023-01-13 RX ADMIN — FOLIC ACID 1 MG: 1 TABLET ORAL at 09:01

## 2023-01-13 RX ADMIN — PREDNISONE 10 MG: 10 TABLET ORAL at 08:01

## 2023-01-13 RX ADMIN — OXYCODONE HYDROCHLORIDE 20 MG: 10 TABLET ORAL at 09:01

## 2023-01-13 RX ADMIN — OXYCODONE HYDROCHLORIDE 20 MG: 10 TABLET ORAL at 12:01

## 2023-01-13 RX ADMIN — OXYCODONE HYDROCHLORIDE 20 MG: 10 TABLET ORAL at 07:01

## 2023-01-13 RX ADMIN — CETIRIZINE HYDROCHLORIDE 10 MG: 5 TABLET, FILM COATED ORAL at 09:01

## 2023-01-13 RX ADMIN — Medication 6 MG: at 09:01

## 2023-01-13 RX ADMIN — ZINC SULFATE 220 MG (50 MG) CAPSULE 220 MG: CAPSULE at 09:01

## 2023-01-13 RX ADMIN — INSULIN ASPART 14 UNITS: 100 INJECTION, SOLUTION INTRAVENOUS; SUBCUTANEOUS at 05:01

## 2023-01-13 RX ADMIN — EZETIMIBE 10 MG: 10 TABLET ORAL at 09:01

## 2023-01-13 NOTE — PLAN OF CARE
Problem: Physical Therapy  Goal: Physical Therapy Goal  Description: Goals to be met by: 2023     Patient will increase functional independence with mobility by performin. Sit to stand transfer with Modified [place] -MET  CFW  2. Bed to chair transfer with Modified [place] using No Assistive Device -Ongoing  3. Gait  x 150 feet with Modified [place] using rolling walker or cane. -Ongoing  4. Wheelchair propulsion x150 feet with Modified [place] -Ongoing  5. Stand for 10 minutes with [place] -MET CFW (HARMONY)  New Goal: 23 Ascend/descend 12 steps with B HR and Supervision - Ongoing    Outcome: Ongoing, Progressing   Goals updated and appropriate at this time

## 2023-01-13 NOTE — PROGRESS NOTES
[facility] - Skilled Nursing  Adult Nutrition  Progress Note    SUMMARY   Recommendations  Continue regular diet, continue boost glucose BID,RD following  Goals: PO to meet 85% of EEN/EPN with ONS by next RD follow up  Nutrition Goal Status: progressing towards goal  Communication of RD Recs: other (comment) (POC)    Assessment and Plan  Moderate Protein-Calorie Malnutrition  Malnutrition in the context of Acute Illness/Injury     Related to (etiology):  Inability to meet physiological needs PO in the presence of sepsis     Signs and Symptoms (as evidenced by):  Energy Intake: <75% of estimated energy requirement for > one month     Muscle Mass Depletion: mild depletion of clavicle region and interosseous muscle , moderate loss to hands and thighs  Weight Loss: 18%x < one month      Interventions(treatment strategy):  General diet  Collaboration with other providers  Commercial beverage(calories,protein) boost glucose BID chocolate  Vitamin/mineral supplement therapy- MVI  Vitamin supplement therapy- folic acid, ascorbic acid x 30 days   Mineral  supplement therapy zinc x 14 days  Complimentary medicine- probiotic    Improving    Malnutrition Assessment 1/3/23     Skin (Micronutrient): dry, scaly (pt states this skin condition is related to RA)  Nails (Micronutrient): pallor (pt states this is normal for him)  Hair/Scalp (Micronutrient): dry, plucked easily  Eyes (Micronutrient): eyelid(s) pallor (red, infection present and already indentified)  Teeth (Micronutrient): broken dentition (missing back molars)  Neck/Chest (Micronutrient): muscle wasting  Musculoskeletal/Lower Extremities: muscle wasting       Weight Loss (Malnutrition): greater than 5% in 1 month (18% in 4 weeks 12/9-12/31/22)   Orbital Region (Subcutaneous Fat Loss): well nourished  Upper Arm Region (Subcutaneous Fat Loss): well nourished  Thoracic and Lumbar Region: well nourished   Faith Region (Muscle Loss): moderate depletion  Clavicle and  "Acromion Bone Region (Muscle Loss): mild depletion  Dorsal Hand (Muscle Loss): mild depletion  Patellar Region (Muscle Loss): mild depletion  Anterior Thigh Region (Muscle Loss): moderate depletion   Edema (Fluid Accumulation): 0-->no edema present             Reason for Assessment    Reason For Assessment: RD follow-up  Diagnosis: infection/sepsis  Relevant Medical History: DM, CHF, HLD, HTN, RA, CVA, NAFLD, CAD, stent placement obesity,smoker  Interdisciplinary Rounds: attended  General Information Comments: Patient eating well, feeling stronger, discussed vitamin regimen ordered last week, Not drinking all the boost glucose TID, agreed to reduce to BID, + BM   Nutrition Discharge Planning: Dc on heart healthy diabetic diet    Nutrition Risk Screen    Nutrition Risk Screen: large or nonhealing wound, burn or pressure injury    Nutrition/Diet History    Patient Reported Diet/Restrictions/Preferences: diabetic diet  Spiritual, Cultural Beliefs, Moravian Practices, Values that Affect Care: no  Food Allergies: NKFA  Factors Affecting Nutritional Intake: decreased appetite, constipation, nausea/vomiting    Anthropometrics    Temp: 97 °F (36.1 °C)  Height Method: Stated  Height: 5' 11" (180.3 cm)  Height (inches): 71 in  Weight Method: Standard Scale  Weight: 93.3 kg (205 lb 11 oz)  Weight (lb): 205.69 lb  Ideal Body Weight (IBW), Male: 172 lb  % Ideal Body Weight, Male (lb): 118.95 %  BMI (Calculated): 28.7  BMI Grade: 25 - 29.9 - overweight  Weight Loss: unintentional  Usual Body Weight (UBW), k.4 kg  Weight Change Amount:  (weight loss over 4 weeks)  % Usual Body Weight: 82.01  % Weight Change From Usual Weight: -18.17 %       Lab/Procedures/Meds    Pertinent Labs Reviewed: reviewed  Pertinent Labs Comments: Hg 12.7, Hct 38.8,  Pertinent Medications Reviewed: reviewed  Pertinent Medications Comments: Lactobacillus GG, ascorbic acid, zinc       Estimated/Assessed Needs    Weight Used For Calorie " Calculations: 92.8 kg (204 lb 9.4 oz)  Energy Calorie Requirements (kcal): 2230  Energy Need Method: [place]-St Jeor (x 1.3 (PAL))  Protein Requirements: 101-117g  Weight Used For Protein Calculations: 78 kg (171 lb 15.3 oz) (1.3-1.5g/kg IBW 2/2 obesity)  Fluid Requirements (mL): 2230 or per MD  Estimated Fluid Requirement Method: RDA Method  RDA Method (mL): 2230  CHO Requirement: 279g      Nutrition Prescription Ordered    Current Diet Order: Regular  Nutrition Order Comments: PO %  Oral Nutrition Supplement: Boost plus TID    Evaluation of Received Nutrient/Fluid Intake    I/O: +2377  Energy Calories Required: meeting needs  Protein Required: meeting needs  Fluid Required: meeting needs  Comments: LBM 1/5  Tolerance: tolerating  % Intake of Estimated Energy Needs: 75 - 100 %  % Meal Intake: 75 - 100 %    Nutrition Risk    Level of Risk/Frequency of Follow-up: low (one time per week)     Monitor and Evaluation    Food and Nutrient Intake: food and beverage intake  Food and Nutrient Adminstration: diet order  Knowledge/Beliefs/Attitudes: food and nutrition knowledge/skill  Physical Activity and Function: nutrition-related ADLs and IADLs  Anthropometric Measurements: weight change  Biochemical Data, Medical Tests and Procedures: electrolyte and renal panel, gastrointestinal profile, glucose/endocrine profile, inflammatory profile  Nutrition-Focused Physical Findings: overall appearance     Nutrition Follow-Up    RD Follow-up?: Yes

## 2023-01-13 NOTE — PT/OT/SLP PROGRESS
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 Patient demos positive response to PT intervention evidenced by increased independence during functional bed mobility and sit <> stand xfers using RW, requires HARMONY and 0% VC to complete functional activities. Patient tolerates static stand with intermittent UE support at RW 7 mins without LOB, limited due to low back and R knee/R shoulder pain. Patient requests return later in the day 2/2 7/10 pain, however when PT returns patient in s/l politely declines continued intervention 2/2 fatigue and malaise second attempt (~11:30 AM), agreeable third attempt (2:07 PM).     Improved functional capacity and decreased pain PM session requires Supervision-SBA for ambulation without AD 50'x2, CGA with 1 LOB using ', navigates 12 stairs with SBA, 10min standing tolerance with HARMONY while performing dual motor task without LOB.    Patient presents below PLOF and would benefit from continued skilled PT to address deficits detailed above and promote safe and functional independence.      Performance deficits affecting function: impaired endurance, impaired functional mobility, pain.    Rehab Potential is good    Activity Tolerance: Fair    Plan:     Patient to be seen 6 x/week to address the above listed problems via gait training, therapeutic activities, therapeutic exercises, neuromuscular re-education    Plan of Care Expires: 02/01/23  Plan  "of Care Reviewed with: patient    Subjective     "I do better later in the day".     Pain/Comfort:  In AM:  Pain Rating 1: 7/10  Location - Side 1:  (R shoulder, R middle finger, R ring finger, R knee, low back pain)  Pain Addressed 1: Reposition, Distraction, Pre-medicate for activity, Nurse notified  Pain Rating Post-Intervention 1: 7/10    In PM:  Pain Rating 1: 4/10  Location - Side 1:  (R knee, low back pain)  Pain Addressed 1: Reposition, Distraction, Pre-medicate for activity, Nurse notified  Pain Rating Post-Intervention 1: 4/10    Patient's cultural, spiritual, Anabaptism conflicts given the current situation:  no    Objective:     Patient found supine with  (no active lines) upon PT entry to room.     Therapeutic Activities and Exercises:     Performed functional mobility training detailed below.     Patient educated on energy conservation techniques, PT POC, importance of OOB activity within tolerance. Allotted time for all questions to be answered. Patient verbalizes understanding of education provided.       Functional Mobility:  Bed Mobility:   flat bed no bedrails  Scooting: modified independence  Supine to Sit: modified independence  Sit to Supine: modified independence  Transfers:     Sit to Stand:  modified independence with rolling walker  0% VC required  Balance:   AM session: Patient tolerates static stand with intermittent UE support at RW 7 mins without LOB, limited due to low back and R knee/R shoulder pain, requires seated rest break EOB to combat   PM session: Patient tolerates static stand with intermittent UE support at mat table (while playing checkers for dual motor task) 10 mins HARMONY without LOB, limited due to low back and R knee pain  Gait: To increase dynamic stability over even surfaces using LRAD  Patient requires Supervision-SBA for ambulation without AD 50'x2, CGA with 1 lateral LOB using ' with MIN cueing for safe SPC placement/sequencing  Educated patient on use of RW " versus no AD based on level of energy and pain   Stair navigation: Patient navigates 12 stairs (4 with BHR, 4 LHR, 4 RHR with 0 rest break) requiring SBA  Step-to pattern leading with stronger LLE    AM-PAC 6 CLICK MOBILITY  22    Patient left supine with call button in reach and RN notified.    GOALS:   Multidisciplinary Problems       Physical Therapy Goals          Problem: Physical Therapy    Goal Priority Disciplines Outcome Goal Variances Interventions   Physical Therapy Goal     PT, PT/OT Ongoing, Progressing     Description: Goals to be met by: 2023     Patient will increase functional independence with mobility by performin. Sit to stand transfer with Modified [place]  2. Bed to chair transfer with Modified [place] using No Assistive Device  3. Gait  x 150 feet with Modified [place] using rolling walker or cane.   4. Wheelchair propulsion x150 feet with Modified [place]   5. Stand for 10 minutes with [place]  New Goal: 23 Ascend/descend 12 steps with B HR and Supervision                         Time Tracking:     PT Received On: 23  PT Start Time: 0815  PT Stop Time: 0829 (Additional time: 7045-6877 PM session)  PT Total Time (min): 46 min    Billable Minutes: Gait Training 16 and Therapeutic Activity 30             PTA Visit Number: 0     2023

## 2023-01-13 NOTE — PROGRESS NOTES
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 male with PMH of DM2, CAD, HTN, and HLD who presents to [facility] for treatment of sepsis and debility. Patient presented to urgent care with complaints of scrotal pain, redness and fever. He was advised to present to ED. Patient presented to the ED the next day. In ED he reported, fevers and chills at home, nausea, decreased PO intake, and multiple syncopal episodes with +incontinence of stool. 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  Empiric initiated with cefepime, clindamycin, metronidazole, and vancomycin.  Urology consulted and he underwent emergent excision and debridement of necrotic tissue on 11/15. Repeat washout performed 11/17. Improved and stepped down from ICU 11/18.  Bedside debridement performed on 11/22.  ID consulted and antibiotics regimen changed to cefepime and Flagyl known.  Wound cultures with Anaerococcus and Prevotella species.  Renal function normalized.  Return of SVT and Cardiology consulted with start of metoprolol. Patient deemed medically stable. Transferred to [facility] on 12/1/22. He completed course of cefepime and flagyl for treatment of kami's gangrene. Discharged to [facility] on 12/31/22.  Today, he has multiple concerns. He would like to make sure he has follow ups scheduled with cardiology, infectious disease, and rheumatology. Also reporting occasional dizziness. He refused his lasix this morning. Reports poor po intake and loss of 50 pounds over the past few months. Will hold lasix for now. He also is requesting to  stop his prednisone, will discontinue. Reports having some sinus congestion and pressure in his ears. Currently on course of zyrtec.      Interval history:  All of today's labs reviewed and are listed below.  Na stable at 138. 24 hr vital sign ranges listed below.  Patient states his pain is well controlled with new regimen, he is no longer on long-acting oxycodone.  Patient instructed to begin learning his dressing changes in preparation for his discharge next week.  Patient states he has a fear of changing his own dressing, he plans to hire someone to come to his house to perform dressing changes.  Patient denies shortness of breath, abdominal discomfort, nausea, or vomiting.  Patient reports an adequate appetite.  Patient denies dysuria.  Patient reports having regular bowel movements.  Patient progessing with PT/OT. Continuing to follow and treat all acute and chronic conditions.      Past Medical History: Patient has a past medical history of Arthritis, Atrial myxoma, CHF (congestive heart failure), Coronary atherosclerosis, Diabetes mellitus, type 2, Difficult intubation, Hepatitis B, Hyperlipidemia, Hypertension, Non-alcoholic fatty liver disease, Rheumatoid arthritis, Rheumatoid arthritis flare (07/12/2021), Stroke, and Systolic heart failure.    Past Surgical History: Patient has a past surgical history that includes Pleura biopsy; Resection of atrial myxoma (2007); Lung lobectomy (Right, 2008); Coronary angiography (N/A, 3/10/2021); Coronary stent placement (03/10/2021); Incision and drainage of scrotum (N/A, 11/15/2022); and Incision and drainage of scrotum (N/A, 11/17/2022).    Social History: Patient reports that he has been smoking cigarettes. He has a 35.00 pack-year smoking history. He has never used smokeless tobacco. He reports that he does not currently use alcohol. He reports that he does not use drugs.    Family History: family history includes Cancer in his sister; Diabetes type I in his  father; Diabetes type II in his mother; Hodgkin's lymphoma in his mother; Kidney failure in his father.    Allergies: Patient is allergic to enbrel [etanercept], nsaids (non-steroidal anti-inflammatory drug), statins-hmg-coa reductase inhibitors, and pcn [penicillins].    ROS  Constitutional: Negative for fever   Eyes: Negative for blurred vision, double vision +left eye conjunctivitis   Respiratory: Negative for cough, shortness of breath.  +nasal congestion   Cardiovascular: Negative for chest pain, palpitations, and leg swelling.   Gastrointestinal: Negative for abdominal pain, constipation, diarrhea, nausea, vomiting.   Genitourinary: Negative for dysuria, frequency   Musculoskeletal:  + generalized weakness. Negative for back pain and myalgias.   Skin: Negative for itching and rash.   Neurological: Negative for dizziness, headaches.   Psychiatric/Behavioral: Negative for depression. The patient is not nervous/anxious.      24 hour Vital Sign Range   Temp:  [96.7 °F (35.9 °C)-99 °F (37.2 °C)]   Pulse:  [78-96]   Resp:  [18]   BP: (116-132)/(68-72)   SpO2:  [95 %-97 %]     Current BMI: Body mass index is 28.69 kg/m².    PEx  Constitutional: Patient appears debilitated.  No distress noted  HENT:   Head: Normocephalic and atraumatic.   Eyes: Pupils are equal, round  Neck: Normal range of motion. Neck supple.   Cardiovascular: Normal rate, regular rhythm and normal heart sounds.    Pulmonary/Chest: Effort normal and breath sounds are clear  Abdominal: Soft. Bowel sounds are normal.   Musculoskeletal: Normal range of motion.   Neurological: Alert and oriented to person, place, and time.   Psychiatric: Normal mood and affect. Behavior is normal.   Skin: Skin is warm and dry.  Scrotal wound     Incision/Site 11/17/22 1639 Left Scrotum lateral vertical     Side: Left  Location: Scrotum  Orientation: lateral  Incision Type: vertical   Incision WDL ex   Dressing Appearance Intact;Clean;Moist drainage   Drainage Amount  Scant   Drainage Characteristics/Odor Tan;Creamy   Appearance Pink;Moist;Epithelialization   Red (%), Wound Tissue Color 100 %   Periwound Area Intact;Dry;Pink   Wound Edges Open   Wound Length (cm) 6 cm   Wound Width (cm) 2.4 cm   Wound Depth (cm) 0.2 cm   Wound Volume (cm^3) 2.88 cm^3   Wound Surface Area (cm^2) 14.4 cm^2     Recent Labs   Lab 01/13/23  0442      K 3.9      CO2 25   BUN 15   CREATININE 0.8   MG 2.0         Recent Labs   Lab 01/13/23  0442   WBC 7.09   RBC 3.88*   HGB 12.2*   HCT 37.5*      MCV 97   MCH 31.4*   MCHC 32.5           Assessment and Plan:    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 Moisten gauze with VASHE and wring out excess liquid. Apply to wound bed tucking into all wound tunnels and undermining so that all aspects of the wound bed are covered. Add additional to lightly fill the wound bed (not tightly pack). Cover with telfa island dressing cut in half- then  Abd pads/ mesh underwear. Change per BID or if dressing comes loose or saturated.  Wound care nurse following  1/6-Reports having an area of drainage around his perineum. Assessed area on exam today, no redness or drainage noted. No fever or leukocytosis. doxycycline 100mg bid as patient will be high risk for infection with the resumption of steroids.- end date 5 days  - 1/13 stable, continue oxycodone IR to 15 or 20 mg q.4 hours PRN.  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 Moisten gauze with VASHE and wring out excess liquid. Apply to wound bed tucking into all wound  tunnels and undermining so that all aspects of the wound bed are covered. Add additional to lightly fill the wound bed (not tightly pack). Cover with telfa island dressing cut in half- then  Abd pads/ mesh underwear. Change per BID or if dressing comes loose or saturated.  Wound care nurse following  1/6-Reports having an area of drainage around his perineum. Assessed area on exam today, no redness or drainage noted. No fever or leukocytosis.  doxycycline 100mg bid as patient will be high risk for infection with the resumption of steroids.- end date 5 days     Rheumatoid Arthritis  1/6-Elevated inflammatory markers, sed rate 78, CRP 19.7, CCP antibodies 511, rheumatoid factor 914.  Discussed resuming prednisone. Patient agreeable to resume prednisone. Discussed with patient's rheumatologist, recommended started prednisone 20mg x 7 days, then 10mg x 7 days, then going down to 5mg    Moderate Protein-Calorie Malnutrition  Related to (etiology):  Inability to meet physiological needs PO in the presence of sepsis     Signs and Symptoms (as evidenced by):  Energy Intake: <75% of estimated energy requirement for > one month     Muscle Mass Depletion: mild depletion of clavicle region and interosseous muscle , moderate loss to hands and thighs  Weight Loss: 18%x < one month   Regular diet  Commercial beverage(calories,protein) boost plus TID chocolate  Vitamin/mineral supplement therapy- MVI  Initiate order for zinc sulfate daily x14 days, ascorbic acid 500 mg x 30 days, and probiotic daily per RD recommendations     Type 2 Diabetes mellitus with hyperglycemia, with long-term current use of insulin  Continue insulin aspart 14 units tid with meals  Insulin detemir 44 units daily  Insulin detemir 5 units nightly  Accuchecks ac and hs  SSI     Coronary artery disease  Continue asa daily     Chronic diastolic heart failure  Continue digoxin 125mcg daily  Continue diltiazem 120mg bid  Continue metoprolol 50 mg bid  Holding  lasix due to poor po intake/ intermittent dizziness     Hypokalemia  On Kcl 40mEq daily  May need to d/c now that we are hold lasix, monitor closely     Conjunctivitis  Continue gentamicin drops tid     Otitis media with effusion     Hyperlipidemia  Continue zetia 10mg daily     Dysphoric mood  Continue lexapro 20mg daily        Anticipate disposition:    Home with home health          Anticipate disposition:  Home with home health      Follow-up needed during SNF stay-    Follow-up needed after discharge from SNF: PCP, ID     Future Appointments   Date Time Provider Department Center   1/18/2023  2:00 PM Shemar Dempsey MD [facility] [id number] [facility]   1/18/2023  3:00 PM Kevin Lares MD [facility] [id number] [facility]   1/18/2023  3:30 PM Lalitha Stroud RN, [facility] [id number] [facility]   2/1/2023  4:00 PM Sylvie Dc MD [facility] ID [facility]   2/2/2023  3:00 PM Shemar Dempsey MD [facility] [id number] [facility]   2/17/2023  8:00 AM Korin Alcala MD [facility] RHEUM Devin kristian Veliz, NP  Department of [facility] Medicine   [facility] Facility     DOS: 1/13/2023       Patient note was created using MModal Dictation.  Any errors in syntax or even information may not have been identified and edited on initial review prior to signing this note.

## 2023-01-13 NOTE — PT/OT/SLP PROGRESS
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  He presents with the following performance deficits affecting function are weakness, impaired self care skills, impaired functional mobility, impaired cardiopulmonary response to activity, impaired endurance, pain. Pt reporting fatigue and pain after PT session, limiting activity during OT this date. Pt requesting to perform stretches to BUE.  Pt continues to be limited by overall endurance, impacting pt's ability to safely perform self-care tasks and mobility.  He would continue to benefit from skilled OT services at [facility] to maximize his gains in functional independence.      Rehab Potential is good    Activity tolerance:  Fair    Plan:     Patient to be seen 5 x/week to address the above listed problems via self-care/home management, therapeutic activities, therapeutic exercises    Plan of Care Expires: 01/31/23  Plan of Care Reviewed with: patient    Subjective   "I did a lot yesterday and I think it's why I'm hurting today"  Communicated with: Nsg and PT prior to session.     Pain/Comfort:  Pain Rating 1: 7/10  Location - Side 1:  (R shoulder, R middle finger, R ring finger, R knee, low back pain)  Pain Addressed 1: Reposition, Distraction, Cessation of Activity  Pain Rating Post-Intervention 1: 7/10    Patient's cultural, spiritual, Tenriism conflicts given the current situation:  yes    Objective:     Patient " found sitting edge of bed with  (no lines attached) upon OT entry to room.    Bed Mobility:    Patient completed Sit to Supine with independence with bed flat    [facility] 6 Click ADL: 21    OT Exercises:   Pt performed AAROM to BUE with Th providing light stretch at end range. Pt performed the following x 5 reps each: shoulder flexion, shoulder abduction, shoulder lateral abduction. Pt requiring increased (A) for R shoulder abduction within full range.     Treatment & Education:  Pt edu on role of OT, POC, safety when performing self care tasks , benefit of performing OOB activity, and safety when performing functional transfers and mobility.    Patient left supine with call button in reach and Nsg notified    GOALS:   Multidisciplinary Problems       Occupational Therapy Goals          Problem: Occupational Therapy    Goal Priority Disciplines Outcome Interventions   Occupational Therapy Goal     OT, PT/OT Ongoing, Progressing    Description: Goals to be met by: 1/30/23     Patient will increase functional independence with ADLs by performing:    LE Dressing with Modified [place].  Grooming while standing at sink with Supervision.  Toileting from toilet with Supervision for hygiene and clothing management.   Bathing from  shower chair/bench with Supervision.  Step transfer with Modified [place]  Upper extremity exercise program, with [place].   Caregiver will be educated on level of assistance required to safely perform self care and functional transfers.                         Time Tracking:     OT Date of Treatment: 01/13/23  OT Start Time: 1447    OT Stop Time: 1502  OT Total Time (min): 15 min    Billable Minutes:Therapeutic Exercise 15    1/13/2023

## 2023-01-13 NOTE — PLAN OF CARE
Continue regular diet, continue boost glucose BID,RD following  Goals: PO to meet 85% of EEN/EPN with ONS by next RD follow up  Nutrition Goal Status: progressing towards goal  Communication of RD Recs: other (comment) (POC)    Assessment and Plan  Moderate Protein-Calorie Malnutrition  Malnutrition in the context of Acute Illness/Injury     Related to (etiology):  Inability to meet physiological needs PO in the presence of sepsis     Signs and Symptoms (as evidenced by):  Energy Intake: <75% of estimated energy requirement for > one month     Muscle Mass Depletion: mild depletion of clavicle region and interosseous muscle , moderate loss to hands and thighs  Weight Loss: 18%x < one month      Interventions(treatment strategy):  General diet  Collaboration with other providers  Commercial beverage(calories,protein) boost glucose BID chocolate  Vitamin/mineral supplement therapy- MVI  Vitamin supplement therapy- folic acid, ascorbic acid x 30 days   Mineral  supplement therapy zinc x 14 days  Complimentary medicine- probiotic    Improving

## 2023-01-14 LAB
POCT GLUCOSE: 135 MG/DL (ref 70–110)
POCT GLUCOSE: 151 MG/DL (ref 70–110)
POCT GLUCOSE: 154 MG/DL (ref 70–110)
POCT GLUCOSE: 208 MG/DL (ref 70–110)

## 2023-01-14 PROCEDURE — 25000003 PHARM REV CODE 250: Performed by: STUDENT IN AN ORGANIZED HEALTH CARE EDUCATION/TRAINING PROGRAM

## 2023-01-14 PROCEDURE — 63600175 PHARM REV CODE 636 W HCPCS: Performed by: NURSE PRACTITIONER

## 2023-01-14 PROCEDURE — 25000003 PHARM REV CODE 250: Performed by: NURSE PRACTITIONER

## 2023-01-14 PROCEDURE — 11000004 HC SNF PRIVATE

## 2023-01-14 PROCEDURE — 63600175 PHARM REV CODE 636 W HCPCS: Performed by: STUDENT IN AN ORGANIZED HEALTH CARE EDUCATION/TRAINING PROGRAM

## 2023-01-14 RX ADMIN — ERYTHROMYCIN 1 INCH: 5 OINTMENT OPHTHALMIC at 09:01

## 2023-01-14 RX ADMIN — FLUTICASONE PROPIONATE 100 MCG: 50 SPRAY, METERED NASAL at 09:01

## 2023-01-14 RX ADMIN — DICLOFENAC 4 G: 10 GEL TOPICAL at 09:01

## 2023-01-14 RX ADMIN — FOLIC ACID 1 MG: 1 TABLET ORAL at 09:01

## 2023-01-14 RX ADMIN — FAMOTIDINE 20 MG: 20 TABLET ORAL at 09:01

## 2023-01-14 RX ADMIN — PREDNISONE 10 MG: 10 TABLET ORAL at 09:01

## 2023-01-14 RX ADMIN — INSULIN ASPART 14 UNITS: 100 INJECTION, SOLUTION INTRAVENOUS; SUBCUTANEOUS at 12:01

## 2023-01-14 RX ADMIN — OXYCODONE HYDROCHLORIDE 20 MG: 10 TABLET ORAL at 01:01

## 2023-01-14 RX ADMIN — DILTIAZEM HYDROCHLORIDE 120 MG: 120 CAPSULE, COATED, EXTENDED RELEASE ORAL at 09:01

## 2023-01-14 RX ADMIN — MUPIROCIN: 20 OINTMENT TOPICAL at 09:01

## 2023-01-14 RX ADMIN — ASPIRIN 81 MG 81 MG: 81 TABLET ORAL at 09:01

## 2023-01-14 RX ADMIN — OXYCODONE HYDROCHLORIDE 20 MG: 10 TABLET ORAL at 03:01

## 2023-01-14 RX ADMIN — METOPROLOL TARTRATE 50 MG: 50 TABLET, FILM COATED ORAL at 10:01

## 2023-01-14 RX ADMIN — ZINC SULFATE 220 MG (50 MG) CAPSULE 220 MG: CAPSULE at 09:01

## 2023-01-14 RX ADMIN — THERA TABS 1 TABLET: TAB at 09:01

## 2023-01-14 RX ADMIN — HYDROXYZINE HYDROCHLORIDE 25 MG: 25 TABLET, FILM COATED ORAL at 11:01

## 2023-01-14 RX ADMIN — INSULIN ASPART 2 UNITS: 100 INJECTION, SOLUTION INTRAVENOUS; SUBCUTANEOUS at 05:01

## 2023-01-14 RX ADMIN — INSULIN DETEMIR 44 UNITS: 100 INJECTION, SOLUTION SUBCUTANEOUS at 09:01

## 2023-01-14 RX ADMIN — CETIRIZINE HYDROCHLORIDE 10 MG: 5 TABLET, FILM COATED ORAL at 09:01

## 2023-01-14 RX ADMIN — INSULIN ASPART 4 UNITS: 100 INJECTION, SOLUTION INTRAVENOUS; SUBCUTANEOUS at 12:01

## 2023-01-14 RX ADMIN — OXYCODONE HYDROCHLORIDE AND ACETAMINOPHEN 500 MG: 500 TABLET ORAL at 09:01

## 2023-01-14 RX ADMIN — METOPROLOL TARTRATE 50 MG: 50 TABLET, FILM COATED ORAL at 09:01

## 2023-01-14 RX ADMIN — OXYCODONE HYDROCHLORIDE 20 MG: 10 TABLET ORAL at 11:01

## 2023-01-14 RX ADMIN — EZETIMIBE 10 MG: 10 TABLET ORAL at 09:01

## 2023-01-14 RX ADMIN — LIDOCAINE 1 PATCH: 50 PATCH CUTANEOUS at 12:01

## 2023-01-14 RX ADMIN — OXYCODONE HYDROCHLORIDE 20 MG: 10 TABLET ORAL at 10:01

## 2023-01-14 RX ADMIN — OXYCODONE HYDROCHLORIDE 20 MG: 10 TABLET ORAL at 06:01

## 2023-01-14 RX ADMIN — OXYCODONE HYDROCHLORIDE 20 MG: 10 TABLET ORAL at 07:01

## 2023-01-14 RX ADMIN — ESCITALOPRAM OXALATE 20 MG: 10 TABLET ORAL at 09:01

## 2023-01-14 RX ADMIN — INSULIN DETEMIR 5 UNITS: 100 INJECTION, SOLUTION SUBCUTANEOUS at 09:01

## 2023-01-14 RX ADMIN — INSULIN ASPART 14 UNITS: 100 INJECTION, SOLUTION INTRAVENOUS; SUBCUTANEOUS at 05:01

## 2023-01-14 RX ADMIN — INSULIN ASPART 14 UNITS: 100 INJECTION, SOLUTION INTRAVENOUS; SUBCUTANEOUS at 09:01

## 2023-01-14 RX ADMIN — Medication 1 CAPSULE: at 09:01

## 2023-01-14 RX ADMIN — DIGOXIN 125 MCG: 125 TABLET ORAL at 09:01

## 2023-01-14 NOTE — PLAN OF CARE
Problem: Adult Inpatient Plan of Care  Goal: Plan of Care Review  Outcome: Ongoing, Progressing  Goal: Patient-Specific Goal (Individualized)  Outcome: Ongoing, Progressing  Goal: Absence of Hospital-Acquired Illness or Injury  Outcome: Ongoing, Progressing  Goal: Readiness for Transition of Care  Outcome: Ongoing, Progressing     Problem: Diabetes Comorbidity  Goal: Blood Glucose Level Within Targeted Range  Outcome: Ongoing, Progressing     Problem: Fall Injury Risk  Goal: Absence of Fall and Fall-Related Injury  Outcome: Ongoing, Progressing

## 2023-01-15 LAB
POCT GLUCOSE: 103 MG/DL (ref 70–110)
POCT GLUCOSE: 137 MG/DL (ref 70–110)
POCT GLUCOSE: 163 MG/DL (ref 70–110)
POCT GLUCOSE: 83 MG/DL (ref 70–110)

## 2023-01-15 PROCEDURE — 25000003 PHARM REV CODE 250: Performed by: NURSE PRACTITIONER

## 2023-01-15 PROCEDURE — 97110 THERAPEUTIC EXERCISES: CPT | Mod: CQ

## 2023-01-15 PROCEDURE — 97116 GAIT TRAINING THERAPY: CPT | Mod: CQ

## 2023-01-15 PROCEDURE — 97530 THERAPEUTIC ACTIVITIES: CPT | Mod: CO

## 2023-01-15 PROCEDURE — 11000004 HC SNF PRIVATE

## 2023-01-15 PROCEDURE — 97535 SELF CARE MNGMENT TRAINING: CPT | Mod: CO

## 2023-01-15 PROCEDURE — 25000003 PHARM REV CODE 250: Performed by: STUDENT IN AN ORGANIZED HEALTH CARE EDUCATION/TRAINING PROGRAM

## 2023-01-15 PROCEDURE — 63600175 PHARM REV CODE 636 W HCPCS: Performed by: STUDENT IN AN ORGANIZED HEALTH CARE EDUCATION/TRAINING PROGRAM

## 2023-01-15 RX ADMIN — INSULIN ASPART 14 UNITS: 100 INJECTION, SOLUTION INTRAVENOUS; SUBCUTANEOUS at 12:01

## 2023-01-15 RX ADMIN — OXYCODONE HYDROCHLORIDE 20 MG: 10 TABLET ORAL at 05:01

## 2023-01-15 RX ADMIN — LIDOCAINE 1 PATCH: 50 PATCH CUTANEOUS at 02:01

## 2023-01-15 RX ADMIN — ERYTHROMYCIN: 5 OINTMENT OPHTHALMIC at 09:01

## 2023-01-15 RX ADMIN — FOLIC ACID 1 MG: 1 TABLET ORAL at 08:01

## 2023-01-15 RX ADMIN — DIGOXIN 125 MCG: 125 TABLET ORAL at 08:01

## 2023-01-15 RX ADMIN — EZETIMIBE 10 MG: 10 TABLET ORAL at 08:01

## 2023-01-15 RX ADMIN — METOPROLOL TARTRATE 50 MG: 50 TABLET, FILM COATED ORAL at 09:01

## 2023-01-15 RX ADMIN — FLUTICASONE PROPIONATE 100 MCG: 50 SPRAY, METERED NASAL at 08:01

## 2023-01-15 RX ADMIN — DILTIAZEM HYDROCHLORIDE 120 MG: 120 CAPSULE, COATED, EXTENDED RELEASE ORAL at 09:01

## 2023-01-15 RX ADMIN — Medication 1 CAPSULE: at 08:01

## 2023-01-15 RX ADMIN — INSULIN DETEMIR 44 UNITS: 100 INJECTION, SOLUTION SUBCUTANEOUS at 08:01

## 2023-01-15 RX ADMIN — MUPIROCIN: 20 OINTMENT TOPICAL at 08:01

## 2023-01-15 RX ADMIN — INSULIN ASPART 14 UNITS: 100 INJECTION, SOLUTION INTRAVENOUS; SUBCUTANEOUS at 08:01

## 2023-01-15 RX ADMIN — FAMOTIDINE 20 MG: 20 TABLET ORAL at 09:01

## 2023-01-15 RX ADMIN — LINACLOTIDE 72 MCG: 72 CAPSULE, GELATIN COATED ORAL at 02:01

## 2023-01-15 RX ADMIN — OXYCODONE HYDROCHLORIDE 20 MG: 10 TABLET ORAL at 08:01

## 2023-01-15 RX ADMIN — INSULIN ASPART 14 UNITS: 100 INJECTION, SOLUTION INTRAVENOUS; SUBCUTANEOUS at 05:01

## 2023-01-15 RX ADMIN — PREDNISONE 10 MG: 10 TABLET ORAL at 08:01

## 2023-01-15 RX ADMIN — DICLOFENAC 4 G: 10 GEL TOPICAL at 08:01

## 2023-01-15 RX ADMIN — ESCITALOPRAM OXALATE 20 MG: 10 TABLET ORAL at 09:01

## 2023-01-15 RX ADMIN — THERA TABS 1 TABLET: TAB at 08:01

## 2023-01-15 RX ADMIN — OXYCODONE HYDROCHLORIDE 20 MG: 10 TABLET ORAL at 03:01

## 2023-01-15 RX ADMIN — INSULIN DETEMIR 5 UNITS: 100 INJECTION, SOLUTION SUBCUTANEOUS at 09:01

## 2023-01-15 RX ADMIN — METOPROLOL TARTRATE 50 MG: 50 TABLET, FILM COATED ORAL at 08:01

## 2023-01-15 RX ADMIN — OXYCODONE HYDROCHLORIDE 20 MG: 10 TABLET ORAL at 09:01

## 2023-01-15 RX ADMIN — ZINC SULFATE 220 MG (50 MG) CAPSULE 220 MG: CAPSULE at 08:01

## 2023-01-15 RX ADMIN — ASPIRIN 81 MG 81 MG: 81 TABLET ORAL at 08:01

## 2023-01-15 RX ADMIN — FAMOTIDINE 20 MG: 20 TABLET ORAL at 08:01

## 2023-01-15 RX ADMIN — OXYCODONE HYDROCHLORIDE AND ACETAMINOPHEN 500 MG: 500 TABLET ORAL at 08:01

## 2023-01-15 RX ADMIN — DICLOFENAC 4 G: 10 GEL TOPICAL at 09:01

## 2023-01-15 RX ADMIN — OXYCODONE HYDROCHLORIDE 20 MG: 10 TABLET ORAL at 12:01

## 2023-01-15 RX ADMIN — DILTIAZEM HYDROCHLORIDE 120 MG: 120 CAPSULE, COATED, EXTENDED RELEASE ORAL at 08:01

## 2023-01-15 RX ADMIN — CETIRIZINE HYDROCHLORIDE 10 MG: 5 TABLET, FILM COATED ORAL at 08:01

## 2023-01-15 NOTE — PT/OT/SLP PROGRESS
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 Patient continues to progress well towards his goals, as evidence of walking range and performance with balance activities..      Performance deficits affecting function: weakness, impaired endurance, gait instability, pain, decreased ROM, impaired skin, impaired cardiopulmonary response to activity.    Rehab Potential is good    Activity Tolerance: Good    Plan:     Patient to be seen 6 x/week to address the above listed problems via gait training, therapeutic activities, therapeutic exercises, neuromuscular re-education    Plan of Care Expires: 01/17/23  Plan of Care Reviewed with: patient    Subjective     Patient without any new c/o.     Pain/Comfort:  Pain Rating 1: 7/10 (R fingers, R shoulder and R knee)  Pain Addressed 1: Pre-medicate for activity, Reposition, Distraction  Pain Rating Post-Intervention 1: 6/10    Patient's cultural, spiritual, Mandaeism conflicts given the current situation:  no    Objective:     Communicated with NSG prior to session.  Patient found supine with   upon PT entry to room.     Therapeutic Activities and Exercises: Balance activities on mat ~80 ft with CGA. Standing therex with support: MINI SQUATS, HIP FLEX AND HEEL/TOE RIASES 2X10 REPS B LE.    Functional Mobility:  Bed Mobility:     Rolling Left:  modified independence  Scooting: modified independence  Supine to Sit: modified  independence  Transfers:     Sit to Stand:  stand by assistance with no AD  Bed to Chair: stand by assistance with  no AD  using  Stand Pivot  Gait: ~200 ft with no AD with CGA  Stairs:  Pt ascended/descended 8 stair(s) with No Assistive Device with left handrail and right handrail with Stand-by Assistance and Contact Guard Assistance.     AM-PAC 6 CLICK MOBILITY  22    Patient left up in chair with call button in reach.    GOALS:   Multidisciplinary Problems       Physical Therapy Goals          Problem: Physical Therapy    Goal Priority Disciplines Outcome Goal Variances Interventions   Physical Therapy Goal     PT, PT/OT Ongoing, Progressing     Description: Goals to be met by: 2023     Patient will increase functional independence with mobility by performin. Sit to stand transfer with Modified [place] -MET  CFW  2. Bed to chair transfer with Modified [place] using No Assistive Device -Ongoing  3. Gait  x 150 feet with Modified [place] using rolling walker or cane. -Ongoing  4. Wheelchair propulsion x150 feet with Modified [place] -Ongoing  5. Stand for 10 minutes with [place] -MET CFW (HARMONY)  New Goal: 23 Ascend/descend 12 steps with B HR and Supervision - Ongoing                         Time Tracking:     PT Received On: 01/15/23  PT Start Time: 0943  PT Stop Time: 1023  PT Total Time (min): 40 min    Billable Minutes: Gait Training 15 and Therapeutic Exercise 25    Treatment Type: Treatment  PT/PTA: PTA     PTA Visit Number: 1     01/15/2023

## 2023-01-15 NOTE — PT/OT/SLP PROGRESS
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  He presents with limitations in performance of self-care, functional mobility, and ADLs. Performance deficits affecting function are weakness, impaired self care skills, impaired functional mobility, impaired cardiopulmonary response to activity, impaired endurance, pain. Pt tolerated Tx without incident, however required v/c for safety secondary to impulsivity. Pt is making progress but continues to require assist to perform self care tasks, functional mobility and functional transfers. Pt would continue to benefit from OT intervention to further functional (I)ce and safety.    Rehab Potential is good    Activity tolerance:  Fair    Plan:     Patient to be seen 5 x/week to address the above listed problems via self-care/home management, therapeutic activities, therapeutic exercises    Plan of Care Expires: 01/31/23  Plan of Care Reviewed with: patient    Subjective     Communicated with: Nurse prior to session.    Pain/Comfort:  Pain Rating 1: 4/10  Location - Orientation 1: generalized  Location 1: scrotum  Pain Addressed 1: Pre-medicate for activity, Distraction  Pain Rating Post-Intervention 1: 5/10    Patient's cultural, spiritual, Christian conflicts given the current situation:  yes    Objective:     Patient found supine with  (no lines attached) upon OT entry to room.    Bed Mobility:    Patient completed  Scooting/Bridging with supervision  Patient completed Supine to Sit with supervision     Functional Mobility/Transfers:  Patient completed Sit <> Stand Transfer with supervision  with  no assistive device   Patient completed Chair <> EOB Step Transfer technique with stand by assistance with no assistive device  Functional Mobility: Pt ambulated around room with no assistive device with SBA. Pt ambulated around gym once with RW and SBA. Pt propelled W/C from room to therapy gym with sup for approximately 100 ft using BUE to propel chair.    Activities of Daily Living:  Grooming: stand by assistance standing sinkside with ambulating to get supplies to shave  Upper Body Dressing: stand by assistance standing without set up    [facility] 6 Click ADL: 21    Treatment & Education:  Pt participated in gross motor dynamic standing activity with CGA and RW with large exercise ball with focus on standing tolerance, functional reaching, dynamic standing bal, crossing midline, and to promote independence with homemaking and self care tasks.      Pt educated on role of OT, safety while performing functional transfers, safety while performing self care tasks, orthopedic precautions, importance to adhering to precautions and progress towards OT goals.       Patient left sitting edge of bed with call button in reach    GOALS:   Multidisciplinary Problems       Occupational Therapy Goals          Problem: Occupational Therapy    Goal Priority Disciplines Outcome Interventions   Occupational Therapy Goal     OT, PT/OT Ongoing, Progressing    Description: Goals to be met by: 1/30/23     Patient will increase functional independence with ADLs by performing:    LE Dressing with Modified [place].  Grooming while standing at sink with Supervision.  Toileting from toilet with Supervision for hygiene and clothing management.   Bathing from  shower chair/bench with Supervision.  Step transfer with Modified [place]  Upper extremity  exercise program, with [place].   Caregiver will be educated on level of assistance required to safely perform self care and functional transfers.                         Time Tracking:     OT Date of Treatment: 01/15/23  OT Start Time: 1335    OT Stop Time: 1408  OT Total Time (min): 33 min    Billable Minutes:Self Care/Home Management 18  Therapeutic Activity 15    1/15/2023

## 2023-01-16 LAB
POCT GLUCOSE: 109 MG/DL (ref 70–110)
POCT GLUCOSE: 172 MG/DL (ref 70–110)
POCT GLUCOSE: 200 MG/DL (ref 70–110)
POCT GLUCOSE: 202 MG/DL (ref 70–110)

## 2023-01-16 PROCEDURE — 25000003 PHARM REV CODE 250: Performed by: STUDENT IN AN ORGANIZED HEALTH CARE EDUCATION/TRAINING PROGRAM

## 2023-01-16 PROCEDURE — 25000003 PHARM REV CODE 250: Performed by: NURSE PRACTITIONER

## 2023-01-16 PROCEDURE — 11000004 HC SNF PRIVATE

## 2023-01-16 PROCEDURE — 63600175 PHARM REV CODE 636 W HCPCS: Performed by: STUDENT IN AN ORGANIZED HEALTH CARE EDUCATION/TRAINING PROGRAM

## 2023-01-16 RX ADMIN — ERYTHROMYCIN 1 INCH: 5 OINTMENT OPHTHALMIC at 09:01

## 2023-01-16 RX ADMIN — CETIRIZINE HYDROCHLORIDE 10 MG: 5 TABLET, FILM COATED ORAL at 09:01

## 2023-01-16 RX ADMIN — THERA TABS 1 TABLET: TAB at 09:01

## 2023-01-16 RX ADMIN — INSULIN ASPART 14 UNITS: 100 INJECTION, SOLUTION INTRAVENOUS; SUBCUTANEOUS at 05:01

## 2023-01-16 RX ADMIN — DICLOFENAC 4 G: 10 GEL TOPICAL at 09:01

## 2023-01-16 RX ADMIN — METOPROLOL TARTRATE 50 MG: 50 TABLET, FILM COATED ORAL at 09:01

## 2023-01-16 RX ADMIN — Medication 1 CAPSULE: at 09:01

## 2023-01-16 RX ADMIN — FAMOTIDINE 20 MG: 20 TABLET ORAL at 09:01

## 2023-01-16 RX ADMIN — FLUTICASONE PROPIONATE 100 MCG: 50 SPRAY, METERED NASAL at 09:01

## 2023-01-16 RX ADMIN — OXYCODONE HYDROCHLORIDE 20 MG: 10 TABLET ORAL at 01:01

## 2023-01-16 RX ADMIN — INSULIN DETEMIR 44 UNITS: 100 INJECTION, SOLUTION SUBCUTANEOUS at 09:01

## 2023-01-16 RX ADMIN — DILTIAZEM HYDROCHLORIDE 120 MG: 120 CAPSULE, COATED, EXTENDED RELEASE ORAL at 09:01

## 2023-01-16 RX ADMIN — ASPIRIN 81 MG 81 MG: 81 TABLET ORAL at 09:01

## 2023-01-16 RX ADMIN — EZETIMIBE 10 MG: 10 TABLET ORAL at 09:01

## 2023-01-16 RX ADMIN — FOLIC ACID 1 MG: 1 TABLET ORAL at 09:01

## 2023-01-16 RX ADMIN — OXYCODONE HYDROCHLORIDE 20 MG: 10 TABLET ORAL at 09:01

## 2023-01-16 RX ADMIN — OXYCODONE HYDROCHLORIDE 20 MG: 10 TABLET ORAL at 05:01

## 2023-01-16 RX ADMIN — INSULIN ASPART 2 UNITS: 100 INJECTION, SOLUTION INTRAVENOUS; SUBCUTANEOUS at 12:01

## 2023-01-16 RX ADMIN — ZINC SULFATE 220 MG (50 MG) CAPSULE 220 MG: CAPSULE at 09:01

## 2023-01-16 RX ADMIN — LIDOCAINE 1 PATCH: 50 PATCH CUTANEOUS at 01:01

## 2023-01-16 RX ADMIN — PREDNISONE 10 MG: 10 TABLET ORAL at 09:01

## 2023-01-16 RX ADMIN — OXYCODONE HYDROCHLORIDE AND ACETAMINOPHEN 500 MG: 500 TABLET ORAL at 09:01

## 2023-01-16 RX ADMIN — INSULIN ASPART 14 UNITS: 100 INJECTION, SOLUTION INTRAVENOUS; SUBCUTANEOUS at 08:01

## 2023-01-16 RX ADMIN — ESCITALOPRAM OXALATE 20 MG: 10 TABLET ORAL at 09:01

## 2023-01-16 RX ADMIN — INSULIN DETEMIR 5 UNITS: 100 INJECTION, SOLUTION SUBCUTANEOUS at 09:01

## 2023-01-16 RX ADMIN — INSULIN ASPART 14 UNITS: 100 INJECTION, SOLUTION INTRAVENOUS; SUBCUTANEOUS at 12:01

## 2023-01-16 RX ADMIN — INSULIN ASPART 4 UNITS: 100 INJECTION, SOLUTION INTRAVENOUS; SUBCUTANEOUS at 08:01

## 2023-01-16 RX ADMIN — DIGOXIN 125 MCG: 125 TABLET ORAL at 09:01

## 2023-01-16 NOTE — PLAN OF CARE
Problem: Adult Inpatient Plan of Care  Goal: Plan of Care Review  Outcome: Ongoing, Progressing  Goal: Patient-Specific Goal (Individualized)  Outcome: Ongoing, Progressing     Problem: Diabetes Comorbidity  Goal: Blood Glucose Level Within Targeted Range  Outcome: Ongoing, Progressing     Problem: Fall Injury Risk  Goal: Absence of Fall and Fall-Related Injury  Outcome: Ongoing, Progressing     Problem: Skin Injury Risk Increased  Goal: Skin Health and Integrity  Outcome: Ongoing, Progressing

## 2023-01-17 LAB
ANION GAP SERPL CALC-SCNC: 9 MMOL/L (ref 8–16)
BASOPHILS # BLD AUTO: 0.07 K/UL (ref 0–0.2)
BASOPHILS NFR BLD: 0.9 % (ref 0–1.9)
BUN SERPL-MCNC: 12 MG/DL (ref 8–23)
CALCIUM SERPL-MCNC: 8.7 MG/DL (ref 8.7–10.5)
CHLORIDE SERPL-SCNC: 102 MMOL/L (ref 95–110)
CO2 SERPL-SCNC: 25 MMOL/L (ref 23–29)
CREAT SERPL-MCNC: 0.8 MG/DL (ref 0.5–1.4)
DIFFERENTIAL METHOD: ABNORMAL
EOSINOPHIL # BLD AUTO: 0.4 K/UL (ref 0–0.5)
EOSINOPHIL NFR BLD: 4.7 % (ref 0–8)
ERYTHROCYTE [DISTWIDTH] IN BLOOD BY AUTOMATED COUNT: 11.9 % (ref 11.5–14.5)
EST. GFR  (NO RACE VARIABLE): >60 ML/MIN/1.73 M^2
GLUCOSE SERPL-MCNC: 199 MG/DL (ref 70–110)
HCT VFR BLD AUTO: 36.8 % (ref 40–54)
HGB BLD-MCNC: 12 G/DL (ref 14–18)
IMM GRANULOCYTES # BLD AUTO: 0.05 K/UL (ref 0–0.04)
IMM GRANULOCYTES NFR BLD AUTO: 0.6 % (ref 0–0.5)
LYMPHOCYTES # BLD AUTO: 2.6 K/UL (ref 1–4.8)
LYMPHOCYTES NFR BLD: 33.9 % (ref 18–48)
MAGNESIUM SERPL-MCNC: 2.1 MG/DL (ref 1.6–2.6)
MCH RBC QN AUTO: 31.7 PG (ref 27–31)
MCHC RBC AUTO-ENTMCNC: 32.6 G/DL (ref 32–36)
MCV RBC AUTO: 97 FL (ref 82–98)
MONOCYTES # BLD AUTO: 0.8 K/UL (ref 0.3–1)
MONOCYTES NFR BLD: 10.6 % (ref 4–15)
NEUTROPHILS # BLD AUTO: 3.8 K/UL (ref 1.8–7.7)
NEUTROPHILS NFR BLD: 49.3 % (ref 38–73)
NRBC BLD-RTO: 0 /100 WBC
PHOSPHATE SERPL-MCNC: 4.4 MG/DL (ref 2.7–4.5)
PLATELET # BLD AUTO: 220 K/UL (ref 150–450)
PMV BLD AUTO: 12.2 FL (ref 9.2–12.9)
POCT GLUCOSE: 174 MG/DL (ref 70–110)
POCT GLUCOSE: 184 MG/DL (ref 70–110)
POCT GLUCOSE: 209 MG/DL (ref 70–110)
POCT GLUCOSE: 274 MG/DL (ref 70–110)
POTASSIUM SERPL-SCNC: 4 MMOL/L (ref 3.5–5.1)
RBC # BLD AUTO: 3.79 M/UL (ref 4.6–6.2)
SARS-COV-2 RNA RESP QL NAA+PROBE: NOT DETECTED
SODIUM SERPL-SCNC: 136 MMOL/L (ref 136–145)
WBC # BLD AUTO: 7.74 K/UL (ref 3.9–12.7)

## 2023-01-17 PROCEDURE — 80048 BASIC METABOLIC PNL TOTAL CA: CPT | Performed by: NURSE PRACTITIONER

## 2023-01-17 PROCEDURE — 84100 ASSAY OF PHOSPHORUS: CPT | Performed by: NURSE PRACTITIONER

## 2023-01-17 PROCEDURE — 25000003 PHARM REV CODE 250: Performed by: NURSE PRACTITIONER

## 2023-01-17 PROCEDURE — 85025 COMPLETE CBC W/AUTO DIFF WBC: CPT | Performed by: NURSE PRACTITIONER

## 2023-01-17 PROCEDURE — U0005 INFEC AGEN DETEC AMPLI PROBE: HCPCS | Performed by: HOSPITALIST

## 2023-01-17 PROCEDURE — 63600175 PHARM REV CODE 636 W HCPCS: Performed by: STUDENT IN AN ORGANIZED HEALTH CARE EDUCATION/TRAINING PROGRAM

## 2023-01-17 PROCEDURE — 25000003 PHARM REV CODE 250: Performed by: STUDENT IN AN ORGANIZED HEALTH CARE EDUCATION/TRAINING PROGRAM

## 2023-01-17 PROCEDURE — 36415 COLL VENOUS BLD VENIPUNCTURE: CPT | Performed by: NURSE PRACTITIONER

## 2023-01-17 PROCEDURE — 97530 THERAPEUTIC ACTIVITIES: CPT | Mod: CQ

## 2023-01-17 PROCEDURE — U0003 INFECTIOUS AGENT DETECTION BY NUCLEIC ACID (DNA OR RNA); SEVERE ACUTE RESPIRATORY SYNDROME CORONAVIRUS 2 (SARS-COV-2) (CORONAVIRUS DISEASE [COVID-19]), AMPLIFIED PROBE TECHNIQUE, MAKING USE OF HIGH THROUGHPUT TECHNOLOGIES AS DESCRIBED BY CMS-2020-01-R: HCPCS | Performed by: HOSPITALIST

## 2023-01-17 PROCEDURE — 11000004 HC SNF PRIVATE

## 2023-01-17 PROCEDURE — 83735 ASSAY OF MAGNESIUM: CPT | Performed by: NURSE PRACTITIONER

## 2023-01-17 RX ORDER — POTASSIUM CHLORIDE 20 MEQ/1
20 TABLET, EXTENDED RELEASE ORAL DAILY
Status: COMPLETED | OUTPATIENT
Start: 2023-01-17 | End: 2023-01-19

## 2023-01-17 RX ORDER — FUROSEMIDE 40 MG/1
40 TABLET ORAL DAILY
Status: COMPLETED | OUTPATIENT
Start: 2023-01-17 | End: 2023-01-19

## 2023-01-17 RX ADMIN — INSULIN ASPART 14 UNITS: 100 INJECTION, SOLUTION INTRAVENOUS; SUBCUTANEOUS at 12:01

## 2023-01-17 RX ADMIN — OXYCODONE HYDROCHLORIDE 20 MG: 10 TABLET ORAL at 09:01

## 2023-01-17 RX ADMIN — FAMOTIDINE 20 MG: 20 TABLET ORAL at 09:01

## 2023-01-17 RX ADMIN — INSULIN ASPART 14 UNITS: 100 INJECTION, SOLUTION INTRAVENOUS; SUBCUTANEOUS at 05:01

## 2023-01-17 RX ADMIN — PREDNISONE 10 MG: 10 TABLET ORAL at 09:01

## 2023-01-17 RX ADMIN — FUROSEMIDE 40 MG: 40 TABLET ORAL at 01:01

## 2023-01-17 RX ADMIN — DICLOFENAC 4 G: 10 GEL TOPICAL at 09:01

## 2023-01-17 RX ADMIN — FOLIC ACID 1 MG: 1 TABLET ORAL at 09:01

## 2023-01-17 RX ADMIN — ESCITALOPRAM OXALATE 20 MG: 10 TABLET ORAL at 09:01

## 2023-01-17 RX ADMIN — INSULIN DETEMIR 5 UNITS: 100 INJECTION, SOLUTION SUBCUTANEOUS at 09:01

## 2023-01-17 RX ADMIN — OXYCODONE HYDROCHLORIDE 15 MG: 5 TABLET ORAL at 05:01

## 2023-01-17 RX ADMIN — OXYCODONE HYDROCHLORIDE AND ACETAMINOPHEN 500 MG: 500 TABLET ORAL at 09:01

## 2023-01-17 RX ADMIN — Medication 1 CAPSULE: at 09:01

## 2023-01-17 RX ADMIN — INSULIN ASPART 4 UNITS: 100 INJECTION, SOLUTION INTRAVENOUS; SUBCUTANEOUS at 12:01

## 2023-01-17 RX ADMIN — OXYCODONE HYDROCHLORIDE 20 MG: 10 TABLET ORAL at 01:01

## 2023-01-17 RX ADMIN — METOPROLOL TARTRATE 50 MG: 50 TABLET, FILM COATED ORAL at 09:01

## 2023-01-17 RX ADMIN — ERYTHROMYCIN: 5 OINTMENT OPHTHALMIC at 09:01

## 2023-01-17 RX ADMIN — INSULIN ASPART 6 UNITS: 100 INJECTION, SOLUTION INTRAVENOUS; SUBCUTANEOUS at 05:01

## 2023-01-17 RX ADMIN — DILTIAZEM HYDROCHLORIDE 120 MG: 120 CAPSULE, COATED, EXTENDED RELEASE ORAL at 09:01

## 2023-01-17 RX ADMIN — DIGOXIN 125 MCG: 125 TABLET ORAL at 09:01

## 2023-01-17 RX ADMIN — OXYCODONE HYDROCHLORIDE 15 MG: 5 TABLET ORAL at 01:01

## 2023-01-17 RX ADMIN — OXYCODONE HYDROCHLORIDE 20 MG: 10 TABLET ORAL at 05:01

## 2023-01-17 RX ADMIN — THERA TABS 1 TABLET: TAB at 09:01

## 2023-01-17 RX ADMIN — ZINC SULFATE 220 MG (50 MG) CAPSULE 220 MG: CAPSULE at 09:01

## 2023-01-17 RX ADMIN — INSULIN ASPART 14 UNITS: 100 INJECTION, SOLUTION INTRAVENOUS; SUBCUTANEOUS at 07:01

## 2023-01-17 RX ADMIN — CETIRIZINE HYDROCHLORIDE 10 MG: 5 TABLET, FILM COATED ORAL at 09:01

## 2023-01-17 RX ADMIN — POTASSIUM CHLORIDE 20 MEQ: 1500 TABLET, EXTENDED RELEASE ORAL at 01:01

## 2023-01-17 RX ADMIN — ASPIRIN 81 MG 81 MG: 81 TABLET ORAL at 09:01

## 2023-01-17 RX ADMIN — INSULIN DETEMIR 44 UNITS: 100 INJECTION, SOLUTION SUBCUTANEOUS at 09:01

## 2023-01-17 RX ADMIN — EZETIMIBE 10 MG: 10 TABLET ORAL at 09:01

## 2023-01-17 NOTE — PROGRESS NOTES
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 male with PMH of DM2, CAD, HTN, and HLD who presents to [facility] for treatment of sepsis and debility. Patient presented to urgent care with complaints of scrotal pain, redness and fever. He was advised to present to ED. Patient presented to the ED the next day. In ED he reported, fevers and chills at home, nausea, decreased PO intake, and multiple syncopal episodes with +incontinence of stool. 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  Empiric initiated with cefepime, clindamycin, metronidazole, and vancomycin.  Urology consulted and he underwent emergent excision and debridement of necrotic tissue on 11/15. Repeat washout performed 11/17. Improved and stepped down from ICU 11/18.  Bedside debridement performed on 11/22.  ID consulted and antibiotics regimen changed to cefepime and Flagyl known.  Wound cultures with Anaerococcus and Prevotella species.  Renal function normalized.  Return of SVT and Cardiology consulted with start of metoprolol. Patient deemed medically stable. Transferred to [facility] on 12/1/22. He completed course of cefepime and flagyl for treatment of kami's gangrene. Discharged to OS on 12/31/22.  Today, he has multiple concerns. He would like to make sure he has follow ups scheduled with cardiology, infectious disease, and rheumatology. Also reporting occasional dizziness. He refused his lasix this morning. Reports poor po intake and loss of 50 pounds over the past few months. Will hold lasix  for now. He also is requesting to stop his prednisone, will discontinue. Reports having some sinus congestion and pressure in his ears. Currently on course of zyrtec.      Interval history:  All of today's labs reviewed and are listed below.  24 hr vital sign ranges listed below.  Patient displayed bilateral lower extremity edema.  He states he takes Lasix PRN at home.  Pain is better controled. Pt is performing his own dressing changes in preparation for DC. Patient denies shortness of breath, abdominal discomfort, nausea, or vomiting.  Patient reports an adequate appetite.  Patient denies dysuria.  Patient reports having regular bowel movements.  Patient progessing with PT/OT. Continuing to follow and treat all acute and chronic conditions.    Past Medical History: Patient has a past medical history of Arthritis, Atrial myxoma, CHF (congestive heart failure), Coronary atherosclerosis, Diabetes mellitus, type 2, Difficult intubation, Hepatitis B, Hyperlipidemia, Hypertension, Non-alcoholic fatty liver disease, Rheumatoid arthritis, Rheumatoid arthritis flare (07/12/2021), Stroke, and Systolic heart failure.    Past Surgical History: Patient has a past surgical history that includes Pleura biopsy; Resection of atrial myxoma (2007); Lung lobectomy (Right, 2008); Coronary angiography (N/A, 3/10/2021); Coronary stent placement (03/10/2021); Incision and drainage of scrotum (N/A, 11/15/2022); and Incision and drainage of scrotum (N/A, 11/17/2022).    Social History: Patient reports that he has been smoking cigarettes. He has a 35.00 pack-year smoking history. He has never used smokeless tobacco. He reports that he does not currently use alcohol. He reports that he does not use drugs.    Family History: family history includes Cancer in his sister; Diabetes type I in his father; Diabetes type II in his mother; Hodgkin's lymphoma in his mother; Kidney failure in his father.    Allergies: Patient is allergic to enbrel  [etanercept], nsaids (non-steroidal anti-inflammatory drug), statins-hmg-coa reductase inhibitors, and pcn [penicillins].    ROS  Constitutional: Negative for fever   Eyes: Negative for blurred vision, double vision +left eye conjunctivitis   Respiratory: Negative for cough, shortness of breath.    Cardiovascular: Negative for chest pain, palpitations.  + leg swelling.   Gastrointestinal: Negative for abdominal pain, constipation, diarrhea, nausea, vomiting.   Genitourinary: Negative for dysuria, frequency   Musculoskeletal:  + generalized weakness. Negative for back pain and myalgias.   Skin: Negative for itching and rash.   Neurological: Negative for dizziness, headaches.   Psychiatric/Behavioral: Negative for depression. The patient is not nervous/anxious.      24 hour Vital Sign Range   Temp:  [97.5 °F (36.4 °C)-98.1 °F (36.7 °C)]   Pulse:  [70-75]   Resp:  [16-18]   BP: (122-130)/(65-80)   SpO2:  [94 %-98 %]     Current BMI: Body mass index is 29.09 kg/m².    PEx  Constitutional: Patient appears debilitated.  No distress noted  HENT:   Head: Normocephalic and atraumatic.   Eyes: Pupils are equal, round  Neck: Normal range of motion. Neck supple.   Cardiovascular: Normal rate, regular rhythm and normal heart sounds.    Pulmonary/Chest: Effort normal and breath sounds are clear  Abdominal: Soft. Bowel sounds are normal.   Musculoskeletal: Normal range of motion.   Neurological: Alert and oriented to person, place, and time.   Psychiatric: Normal mood and affect. Behavior is normal.   Skin: Skin is warm and dry.  Scrotal wound     Incision/Site 11/17/22 1639 Left Scrotum lateral vertical     Side: Left  Location: Scrotum  Orientation: lateral  Incision Type: vertical   Incision WDL ex   Dressing Appearance Intact;Clean;Moist drainage   Drainage Amount Scant   Drainage Characteristics/Odor Tan;Creamy   Appearance Pink;Moist;Epithelialization   Red (%), Wound Tissue Color 100 %   Periwound Area Intact;Dry;Pink    Wound Edges Open   Wound Length (cm) 6 cm   Wound Width (cm) 2.4 cm   Wound Depth (cm) 0.2 cm   Wound Volume (cm^3) 2.88 cm^3   Wound Surface Area (cm^2) 14.4 cm^2     Recent Labs   Lab 01/17/23  0420      K 4.0      CO2 25   BUN 12   CREATININE 0.8   MG 2.1         Recent Labs   Lab 01/17/23  0420   WBC 7.74   RBC 3.79*   HGB 12.0*   HCT 36.8*      MCV 97   MCH 31.7*   MCHC 32.6           Assessment and Plan:    Bilateral lower extremity edema  - initiated Lasix 40 mg x3 days along with potassium 20 mEq    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 Moisten gauze with VASHE and wring out excess liquid. Apply to wound bed tucking into all wound tunnels and undermining so that all aspects of the wound bed are covered. Add additional to lightly fill the wound bed (not tightly pack). Cover with telfa island dressing cut in half- then  Abd pads/ mesh underwear. Change per BID or if dressing comes loose or saturated.  Wound care nurse following  1/6-Reports having an area of drainage around his perineum. Assessed area on exam today, no redness or drainage noted. No fever or leukocytosis. doxycycline 100mg bid as patient will be high risk for infection with the resumption of steroids.- end date 5 days  - 1/13 stable, continue oxycodone IR to 15 or 20 mg q.4 hours PRN.  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 Moisten gauze with VASHE and wring out excess liquid. Apply to wound bed tucking into all wound tunnels and undermining so that all aspects of the wound bed are  covered. Add additional to lightly fill the wound bed (not tightly pack). Cover with telfa island dressing cut in half- then  Abd pads/ mesh underwear. Change per BID or if dressing comes loose or saturated.  Wound care nurse following  1/6-Reports having an area of drainage around his perineum. Assessed area on exam today, no redness or drainage noted. No fever or leukocytosis.  doxycycline 100mg bid as patient will be high risk for infection with the resumption of steroids.- end date 5 days     Rheumatoid Arthritis  1/6-Elevated inflammatory markers, sed rate 78, CRP 19.7, CCP antibodies 511, rheumatoid factor 914.  Discussed resuming prednisone. Patient agreeable to resume prednisone. Discussed with patient's rheumatologist, recommended started prednisone 20mg x 7 days, then 10mg x 7 days, then going down to 5mg    Moderate Protein-Calorie Malnutrition  Related to (etiology):  Inability to meet physiological needs PO in the presence of sepsis     Signs and Symptoms (as evidenced by):  Energy Intake: <75% of estimated energy requirement for > one month     Muscle Mass Depletion: mild depletion of clavicle region and interosseous muscle , moderate loss to hands and thighs  Weight Loss: 18%x < one month   Regular diet  Commercial beverage(calories,protein) boost plus TID chocolate  Vitamin/mineral supplement therapy- MVI  Initiate order for zinc sulfate daily x14 days, ascorbic acid 500 mg x 30 days, and probiotic daily per RD recommendations     Type 2 Diabetes mellitus with hyperglycemia, with long-term current use of insulin  Continue insulin aspart 14 units tid with meals  Insulin detemir 44 units daily  Insulin detemir 5 units nightly  Accuchecks ac and hs  SSI     Coronary artery disease  Continue asa daily     Chronic diastolic heart failure  Continue digoxin 125mcg daily  Continue diltiazem 120mg bid  Continue metoprolol 50 mg bid  Holding lasix due to poor po intake/ intermittent dizziness      Hypokalemia  On Kcl 40mEq daily  May need to d/c now that we are hold lasix, monitor closely     Conjunctivitis  Continue gentamicin drops tid     Otitis media with effusion     Hyperlipidemia  Continue zetia 10mg daily     Dysphoric mood  Continue lexapro 20mg daily        Anticipate disposition:    Home with home health          Anticipate disposition:  Home with home health      Follow-up needed during SNF stay-    Follow-up needed after discharge from SNF: PCP, ID     Future Appointments   Date Time Provider Department Center   1/18/2023  2:00 PM Shemar Dempsey MD [facility] [id number] [facility]   1/18/2023  3:00 PM Kevin Lares MD [facility] [id number] [facility]   1/18/2023  3:30 PM Lalitha Stroud RN, E [facility] [id number] [facility]   2/1/2023  4:00 PM Sylvie Dc MD [facility] ID [facility]   2/2/2023  3:00 PM Shemar Dempsey MD [facility] [id number] [facility]   2/17/2023  8:00 AM Korin Alcala MD [facility] RHEUM Devin Veliz NP  Department of Hospital Medicine   [facility]     DOS: 1/17/2023       Patient note was created using MModal Dictation.  Any errors in syntax or even information may not have been identified and edited on initial review prior to signing this note.

## 2023-01-17 NOTE — PROGRESS NOTES
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    History:     Past Medical History:   Diagnosis Date    Arthritis     Atrial myxoma     CHF (congestive heart failure)     Coronary atherosclerosis     Diabetes mellitus, type 2     Difficult intubation     Hepatitis B     Hyperlipidemia     Hypertension     Non-alcoholic fatty liver disease     Rheumatoid arthritis     Rheumatoid arthritis flare 07/12/2021    Stroke     TIA    Systolic heart failure        Social History     Socioeconomic History    Marital status: Single   Occupational History    Occupation: , respiratory therapist, founded [employer]     Comment: Retired   Tobacco Use    Smoking status: Some Days     Packs/day: 1.00     Years: 35.00     Pack years: 35.00     Types: Cigarettes    Smokeless tobacco: Never   Substance and Sexual Activity    Alcohol use: Not Currently    Drug use: No    Sexual activity: Never     Social Determinants of Health     Financial Resource Strain: High Risk    Difficulty of Paying Living Expenses: Hard   Food Insecurity: No Food Insecurity    Worried About Running Out of Food in the Last Year: Never true    Ran Out of Food in the Last Year: Never true   Transportation Needs: Unmet Transportation Needs    Lack of Transportation (Medical): Yes    Lack of Transportation (Non-Medical): Yes   Physical Activity: Inactive    Days of Exercise per Week: 0 days    Minutes of Exercise per Session: 0 min   Stress: Stress Concern Present    Feeling of Stress : To some extent   Social Connections: Socially Isolated    Frequency of Communication with Friends and Family: More than three times a week    Frequency of Social Gatherings with Friends and Family: More than three times a week    Attends [employer] Services: Never    Active Member of Clubs or Organizations: No    Attends Club or Organization Meetings: Never    Marital  Status: Never    Housing Stability: High Risk    Unable to Pay for Housing in the Last Year: Yes    Number of Places Lived in the Last Year: 1    Unstable Housing in the Last Year: No       Precautions:     Allergies as of 12/30/2022 - Reviewed 12/01/2022   Allergen Reaction Noted    Enbrel [etanercept] Shortness Of Breath 07/12/2021    Nsaids (non-steroidal anti-inflammatory drug) Other (See Comments) 06/29/2015    Statins-hmg-coa reductase inhibitors Other (See Comments) 06/29/2015    Pcn [penicillins] Rash 06/29/2015       WOC Assessment Details/Treatment   Wound care follow-up  The left scrotum surgical incision has decreased in size, decreased in discomfort .  Wound care was performed per Mr. Wagner after his shower yesterday.   He feels confident to go home with assist.  The wound bed is pink/epithelization to wound edges.   Wound care discussed, verbalized understanding.     Plan   Left scrotum- Continue  cleansing with VASHE wound solution,  xeroform dressing/pad daily    Nursing to continue care, wound care will follow- up    Recommendations made to primary team for above plan per written report . Orders  in place.      01/17/23 0750        Incision/Site 11/17/22 1639 Left Scrotum lateral vertical   Date First Assessed/Time First Assessed: 11/17/22 1639   Side: Left  Location: Scrotum  Orientation: lateral  Incision Type: vertical   Wound Image    Incision WDL ex   Dressing Appearance Intact   Drainage Amount None   Appearance Pink;Moist;Epithelialization   Red (%), Wound Tissue Color 100 %   Periwound Area Intact;Dry   Wound Edges Open   Wound Length (cm) 5 cm   Wound Width (cm) 1.3 cm   Wound Depth (cm) 0.2 cm   Wound Volume (cm^3) 1.3 cm^3   Wound Surface Area (cm^2) 6.5 cm^2   Care Cleansed with:;Antimicrobial agent   Dressing Changed;Non-adherent;Absorptive Pad   Dressing Change Due 01/18/23 01/17/2023

## 2023-01-17 NOTE — PT/OT/SLP PROGRESS
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     Session limited d/t pain today. Pt ambulated within the room with SBA and noAD. 1 minor LOB noted, no assistance required to recover. Pt continues to benefit from therapy to improve functional endurance, strength, and mobility.     Performance deficits affecting function: weakness, impaired endurance, gait instability, pain, decreased ROM, impaired skin, impaired cardiopulmonary response to activity.    Rehab Potential is good    Activity Tolerance: Fair    Plan:     Patient to be seen 6 x/week to address the above listed problems via gait training, therapeutic activities, therapeutic exercises, neuromuscular re-education    Plan of Care Expires: 01/17/23  Plan of Care Reviewed with: patient    Subjective     "Better than this morning, but still not great".     Pain/Comfort:  Pain Rating 1: 9/10  Location 1:  (did not specify location)  Pain Addressed 1: Pre-medicate for activity  Pain Rating Post-Intervention 1: 9/10    Patient's cultural, spiritual, Latter-day conflicts given the current situation:  no    Objective:     Patient found sitting edge of bed with  (no active lines) upon PT entry to room.     Therapeutic Activities and Exercises:   Patient educated on role of therapy, goals of session, and benefits of out of bed mobility.   Instructed on use of call button and importance of calling nursing staff for assistance with " mobility   Questions/concerns addressed within PTA scope of practice  Pt verbalized understanding.    BP taken: (no reports of dizziness or lightheadedness)  Seated EOB: 137/63    Functional Mobility:  Transfers:   Sit <> Stand Transfer: supervision with no assistive device   Bed >Toilet> Chair Transfer: stand by assistance with no assistive device using Step Transfer technique   Gait:  Pt ambulated ~10+15ft with stand by assistance and no assistive device.  1 minor LOB noted, no assistance required to recover  Verbal/tactile cues for pacing and posture    AM-PAC 6 CLICK MOBILITY  22    Patient left up in chair with call button in reach.    GOALS:   Multidisciplinary Problems       Physical Therapy Goals          Problem: Physical Therapy    Goal Priority Disciplines Outcome Goal Variances Interventions   Physical Therapy Goal     PT, PT/OT Ongoing, Progressing     Description: Goals to be met by: 2023     Patient will increase functional independence with mobility by performin. Sit to stand transfer with Modified [place] -MET  CFW  2. Bed to chair transfer with Modified [place] using No Assistive Device -Ongoing  3. Gait  x 150 feet with Modified [place] using rolling walker or cane. -Ongoing  4. Wheelchair propulsion x150 feet with Modified [place] -Ongoing  5. Stand for 10 minutes with [place] -MET CFW ([place])  New Goal: 23 Ascend/descend 12 steps with B HR and Supervision - Ongoing                         Time Tracking:     PT Received On: 23  PT Start Time: 1600  PT Stop Time: 1618  PT Total Time (min): 18 min    Billable Minutes: Therapeutic Activity 18    Treatment Type: Treatment  PT/PTA: PTA     PTA Visit Number: 2     2023

## 2023-01-17 NOTE — PLAN OF CARE
Interdisciplinary team, Lucretia Light, RN Charge Nurse, Kiara Arndt, RN MDS Coordinator, Vicki Baker PT, Rehab Supervisor, Marquise Santhosh LMSW, and Festus Sherman, spoke to patient for care plan conference, weekly status update, and therapy progress update. Tentative discharge date set for 1/23/23.

## 2023-01-17 NOTE — PT/OT/SLP PROGRESS
Occupational Therapy      Patient Name:  Nithin Wagner   MRN:  [id number]    Patient not seen today secondary to Pain upon 2 attempts. Pt had just gotten pain med upon 1st attempt and requesting OT to check back in 1 hour. Upon return, pt still reporting 10/10 pain. Nursing aware. Will follow-up per POC.    1/17/2023

## 2023-01-18 ENCOUNTER — OFFICE VISIT (OUTPATIENT)
Dept: ENDOCRINOLOGY | Facility: CLINIC | Age: 68
End: 2023-01-18
Payer: MEDICARE

## 2023-01-18 ENCOUNTER — PATIENT MESSAGE (OUTPATIENT)
Dept: DIABETES | Facility: CLINIC | Age: 68
End: 2023-01-18
Payer: MEDICARE

## 2023-01-18 ENCOUNTER — OFFICE VISIT (OUTPATIENT)
Dept: CARDIOLOGY | Facility: CLINIC | Age: 68
End: 2023-01-18
Payer: MEDICARE

## 2023-01-18 VITALS
BODY MASS INDEX: 29.29 KG/M2 | HEART RATE: 64 BPM | OXYGEN SATURATION: 97 % | WEIGHT: 210 LBS | SYSTOLIC BLOOD PRESSURE: 102 MMHG | DIASTOLIC BLOOD PRESSURE: 62 MMHG

## 2023-01-18 VITALS
WEIGHT: 209.44 LBS | HEART RATE: 72 BPM | DIASTOLIC BLOOD PRESSURE: 65 MMHG | BODY MASS INDEX: 29.32 KG/M2 | HEIGHT: 71 IN | SYSTOLIC BLOOD PRESSURE: 127 MMHG

## 2023-01-18 DIAGNOSIS — I70.209 ATHEROSCLEROTIC PERIPHERAL VASCULAR DISEASE: ICD-10-CM

## 2023-01-18 DIAGNOSIS — I25.10 ATHEROSCLEROSIS OF NATIVE CORONARY ARTERY OF NATIVE HEART WITHOUT ANGINA PECTORIS: Primary | ICD-10-CM

## 2023-01-18 DIAGNOSIS — E03.8 SUBCLINICAL HYPOTHYROIDISM: ICD-10-CM

## 2023-01-18 DIAGNOSIS — E78.2 MIXED HYPERLIPIDEMIA: ICD-10-CM

## 2023-01-18 DIAGNOSIS — I50.32 CHRONIC DIASTOLIC HEART FAILURE: ICD-10-CM

## 2023-01-18 DIAGNOSIS — Z79.4 TYPE 2 DIABETES MELLITUS WITH HYPERGLYCEMIA, WITH LONG-TERM CURRENT USE OF INSULIN: ICD-10-CM

## 2023-01-18 DIAGNOSIS — R53.83 FATIGUE, UNSPECIFIED TYPE: ICD-10-CM

## 2023-01-18 DIAGNOSIS — Z79.4 TYPE 2 DIABETES MELLITUS WITH OTHER CIRCULATORY COMPLICATION, WITH LONG-TERM CURRENT USE OF INSULIN: ICD-10-CM

## 2023-01-18 DIAGNOSIS — E11.65 TYPE 2 DIABETES MELLITUS WITH HYPERGLYCEMIA, WITH LONG-TERM CURRENT USE OF INSULIN: ICD-10-CM

## 2023-01-18 DIAGNOSIS — E66.01 SEVERE OBESITY: ICD-10-CM

## 2023-01-18 DIAGNOSIS — J44.9 CHRONIC OBSTRUCTIVE PULMONARY DISEASE, UNSPECIFIED COPD TYPE: ICD-10-CM

## 2023-01-18 DIAGNOSIS — E11.59 TYPE 2 DIABETES MELLITUS WITH OTHER CIRCULATORY COMPLICATION, WITH LONG-TERM CURRENT USE OF INSULIN: ICD-10-CM

## 2023-01-18 DIAGNOSIS — I10 PRIMARY HYPERTENSION: ICD-10-CM

## 2023-01-18 DIAGNOSIS — I47.10 SUPRAVENTRICULAR TACHYCARDIA: ICD-10-CM

## 2023-01-18 DIAGNOSIS — E11.65 TYPE 2 DIABETES MELLITUS WITH HYPERGLYCEMIA, WITH LONG-TERM CURRENT USE OF INSULIN: Primary | ICD-10-CM

## 2023-01-18 DIAGNOSIS — Z79.4 TYPE 2 DIABETES MELLITUS WITH HYPERGLYCEMIA, WITH LONG-TERM CURRENT USE OF INSULIN: Primary | ICD-10-CM

## 2023-01-18 DIAGNOSIS — I10 ESSENTIAL HYPERTENSION: ICD-10-CM

## 2023-01-18 LAB
POCT GLUCOSE: 174 MG/DL (ref 70–110)
POCT GLUCOSE: 202 MG/DL (ref 70–110)
POCT GLUCOSE: 236 MG/DL (ref 70–110)
POCT GLUCOSE: 236 MG/DL (ref 70–110)

## 2023-01-18 PROCEDURE — 3008F BODY MASS INDEX DOCD: CPT | Mod: CPTII,S$GLB,, | Performed by: INTERNAL MEDICINE

## 2023-01-18 PROCEDURE — 1101F PT FALLS ASSESS-DOCD LE1/YR: CPT | Mod: CPTII,S$GLB,, | Performed by: INTERNAL MEDICINE

## 2023-01-18 PROCEDURE — 99499 RISK ADDL DX/OHS AUDIT: ICD-10-PCS | Mod: S$PBB,,, | Performed by: INTERNAL MEDICINE

## 2023-01-18 PROCEDURE — 99215 OFFICE O/P EST HI 40 MIN: CPT | Mod: S$GLB,,, | Performed by: INTERNAL MEDICINE

## 2023-01-18 PROCEDURE — 1111F DSCHRG MED/CURRENT MED MERGE: CPT | Mod: CPTII,S$GLB,, | Performed by: INTERNAL MEDICINE

## 2023-01-18 PROCEDURE — 1126F PR PAIN SEVERITY QUANTIFIED, NO PAIN PRESENT: ICD-10-PCS | Mod: CPTII,S$GLB,, | Performed by: INTERNAL MEDICINE

## 2023-01-18 PROCEDURE — 1160F PR REVIEW ALL MEDS BY PRESCRIBER/CLIN PHARMACIST DOCUMENTED: ICD-10-PCS | Mod: CPTII,S$GLB,, | Performed by: INTERNAL MEDICINE

## 2023-01-18 PROCEDURE — 1160F RVW MEDS BY RX/DR IN RCRD: CPT | Mod: CPTII,S$GLB,, | Performed by: INTERNAL MEDICINE

## 2023-01-18 PROCEDURE — 1159F PR MEDICATION LIST DOCUMENTED IN MEDICAL RECORD: ICD-10-PCS | Mod: CPTII,S$GLB,, | Performed by: INTERNAL MEDICINE

## 2023-01-18 PROCEDURE — 99214 OFFICE O/P EST MOD 30 MIN: CPT | Mod: S$GLB,,, | Performed by: INTERNAL MEDICINE

## 2023-01-18 PROCEDURE — 25000003 PHARM REV CODE 250: Performed by: NURSE PRACTITIONER

## 2023-01-18 PROCEDURE — 1159F MED LIST DOCD IN RCRD: CPT | Mod: CPTII,S$GLB,, | Performed by: INTERNAL MEDICINE

## 2023-01-18 PROCEDURE — 3078F PR MOST RECENT DIASTOLIC BLOOD PRESSURE < 80 MM HG: ICD-10-PCS | Mod: CPTII,S$GLB,, | Performed by: INTERNAL MEDICINE

## 2023-01-18 PROCEDURE — 3074F SYST BP LT 130 MM HG: CPT | Mod: CPTII,S$GLB,, | Performed by: INTERNAL MEDICINE

## 2023-01-18 PROCEDURE — 3078F DIAST BP <80 MM HG: CPT | Mod: CPTII,S$GLB,, | Performed by: INTERNAL MEDICINE

## 2023-01-18 PROCEDURE — 11000004 HC SNF PRIVATE

## 2023-01-18 PROCEDURE — 3074F PR MOST RECENT SYSTOLIC BLOOD PRESSURE < 130 MM HG: ICD-10-PCS | Mod: CPTII,S$GLB,, | Performed by: INTERNAL MEDICINE

## 2023-01-18 PROCEDURE — 63600175 PHARM REV CODE 636 W HCPCS: Performed by: STUDENT IN AN ORGANIZED HEALTH CARE EDUCATION/TRAINING PROGRAM

## 2023-01-18 PROCEDURE — 99214 PR OFFICE/OUTPT VISIT, EST, LEVL IV, 30-39 MIN: ICD-10-PCS | Mod: S$GLB,,, | Performed by: INTERNAL MEDICINE

## 2023-01-18 PROCEDURE — 99499 UNLISTED E&M SERVICE: CPT | Mod: S$PBB,,, | Performed by: INTERNAL MEDICINE

## 2023-01-18 PROCEDURE — 3288F FALL RISK ASSESSMENT DOCD: CPT | Mod: CPTII,S$GLB,, | Performed by: INTERNAL MEDICINE

## 2023-01-18 PROCEDURE — 1126F AMNT PAIN NOTED NONE PRSNT: CPT | Mod: CPTII,S$GLB,, | Performed by: INTERNAL MEDICINE

## 2023-01-18 PROCEDURE — 1101F PR PT FALLS ASSESS DOC 0-1 FALLS W/OUT INJ PAST YR: ICD-10-PCS | Mod: CPTII,S$GLB,, | Performed by: INTERNAL MEDICINE

## 2023-01-18 PROCEDURE — 1111F PR DISCHARGE MEDS RECONCILED W/ CURRENT OUTPATIENT MED LIST: ICD-10-PCS | Mod: CPTII,S$GLB,, | Performed by: INTERNAL MEDICINE

## 2023-01-18 PROCEDURE — 25000003 PHARM REV CODE 250: Performed by: STUDENT IN AN ORGANIZED HEALTH CARE EDUCATION/TRAINING PROGRAM

## 2023-01-18 PROCEDURE — 3008F PR BODY MASS INDEX (BMI) DOCUMENTED: ICD-10-PCS | Mod: CPTII,S$GLB,, | Performed by: INTERNAL MEDICINE

## 2023-01-18 PROCEDURE — 3288F PR FALLS RISK ASSESSMENT DOCUMENTED: ICD-10-PCS | Mod: CPTII,S$GLB,, | Performed by: INTERNAL MEDICINE

## 2023-01-18 PROCEDURE — 99999 PR PBB SHADOW E&M-EST. PATIENT-LVL III: CPT | Mod: PBBFAC,,, | Performed by: INTERNAL MEDICINE

## 2023-01-18 PROCEDURE — 99215 PR OFFICE/OUTPT VISIT, EST, LEVL V, 40-54 MIN: ICD-10-PCS | Mod: S$GLB,,, | Performed by: INTERNAL MEDICINE

## 2023-01-18 PROCEDURE — 99999 PR PBB SHADOW E&M-EST. PATIENT-LVL III: ICD-10-PCS | Mod: PBBFAC,,, | Performed by: INTERNAL MEDICINE

## 2023-01-18 RX ORDER — ASCORBIC ACID 500 MG
500 TABLET ORAL DAILY
Qty: 30 TABLET | Refills: 0 | Status: SHIPPED | OUTPATIENT
Start: 2023-01-19 | End: 2023-02-07

## 2023-01-18 RX ORDER — DILTIAZEM HYDROCHLORIDE 120 MG/1
120 CAPSULE, COATED, EXTENDED RELEASE ORAL EVERY 12 HOURS
Qty: 60 CAPSULE | Refills: 11 | Status: SHIPPED | OUTPATIENT
Start: 2023-01-18 | End: 2023-01-22

## 2023-01-18 RX ORDER — INSULIN PMP CART,AUT,G6/7,CNTR
1 EACH SUBCUTANEOUS
Qty: 15 EACH | Refills: 11 | OUTPATIENT
Start: 2023-01-18 | End: 2023-01-19

## 2023-01-18 RX ORDER — INSULIN ASPART 100 [IU]/ML
INJECTION, SOLUTION INTRAVENOUS; SUBCUTANEOUS
Qty: 30 EACH | Refills: 11 | Status: SHIPPED | OUTPATIENT
Start: 2023-01-18 | End: 2023-06-19

## 2023-01-18 RX ORDER — INSULIN PMP CART,AUT,G6/7,CNTR
1 EACH SUBCUTANEOUS CONTINUOUS
Qty: 1 EACH | Refills: 0 | OUTPATIENT
Start: 2023-01-18 | End: 2023-01-19

## 2023-01-18 RX ORDER — OXYCODONE HYDROCHLORIDE 20 MG/1
20 TABLET ORAL EVERY 4 HOURS PRN
Qty: 42 TABLET | Refills: 0 | Status: ON HOLD | OUTPATIENT
Start: 2023-01-18 | End: 2023-01-29

## 2023-01-18 RX ORDER — AMOXICILLIN 250 MG
1 CAPSULE ORAL 2 TIMES DAILY
COMMUNITY
Start: 2023-01-18 | End: 2023-02-07

## 2023-01-18 RX ORDER — PREDNISONE 10 MG/1
10 TABLET ORAL DAILY
Qty: 1 TABLET | Refills: 0 | Status: SHIPPED | OUTPATIENT
Start: 2023-01-19 | End: 2024-01-05 | Stop reason: SDUPTHER

## 2023-01-18 RX ORDER — PREDNISONE 5 MG/1
5 TABLET ORAL DAILY
Qty: 30 TABLET | Refills: 1 | Status: ON HOLD | OUTPATIENT
Start: 2023-01-20 | End: 2023-01-29 | Stop reason: SDUPTHER

## 2023-01-18 RX ORDER — ZINC SULFATE 50(220)MG
220 CAPSULE ORAL DAILY
Qty: 14 CAPSULE | Refills: 0 | Status: SHIPPED | OUTPATIENT
Start: 2023-01-19 | End: 2023-02-02

## 2023-01-18 RX ORDER — METOPROLOL TARTRATE 50 MG/1
50 TABLET ORAL EVERY 12 HOURS
Qty: 60 TABLET | Refills: 11 | Status: SHIPPED | OUTPATIENT
Start: 2023-01-18 | End: 2023-02-06 | Stop reason: ALTCHOICE

## 2023-01-18 RX ADMIN — INSULIN DETEMIR 44 UNITS: 100 INJECTION, SOLUTION SUBCUTANEOUS at 08:01

## 2023-01-18 RX ADMIN — INSULIN ASPART 14 UNITS: 100 INJECTION, SOLUTION INTRAVENOUS; SUBCUTANEOUS at 08:01

## 2023-01-18 RX ADMIN — ESCITALOPRAM OXALATE 20 MG: 10 TABLET ORAL at 08:01

## 2023-01-18 RX ADMIN — DICLOFENAC 4 G: 10 GEL TOPICAL at 08:01

## 2023-01-18 RX ADMIN — OXYCODONE HYDROCHLORIDE 20 MG: 10 TABLET ORAL at 09:01

## 2023-01-18 RX ADMIN — OXYCODONE HYDROCHLORIDE AND ACETAMINOPHEN 500 MG: 500 TABLET ORAL at 08:01

## 2023-01-18 RX ADMIN — POTASSIUM CHLORIDE 20 MEQ: 1500 TABLET, EXTENDED RELEASE ORAL at 08:01

## 2023-01-18 RX ADMIN — DILTIAZEM HYDROCHLORIDE 120 MG: 120 CAPSULE, COATED, EXTENDED RELEASE ORAL at 08:01

## 2023-01-18 RX ADMIN — HYDROXYZINE HYDROCHLORIDE 25 MG: 25 TABLET, FILM COATED ORAL at 08:01

## 2023-01-18 RX ADMIN — EZETIMIBE 10 MG: 10 TABLET ORAL at 08:01

## 2023-01-18 RX ADMIN — METOPROLOL TARTRATE 50 MG: 50 TABLET, FILM COATED ORAL at 08:01

## 2023-01-18 RX ADMIN — CETIRIZINE HYDROCHLORIDE 10 MG: 5 TABLET, FILM COATED ORAL at 08:01

## 2023-01-18 RX ADMIN — INSULIN ASPART 14 UNITS: 100 INJECTION, SOLUTION INTRAVENOUS; SUBCUTANEOUS at 05:01

## 2023-01-18 RX ADMIN — INSULIN ASPART 4 UNITS: 100 INJECTION, SOLUTION INTRAVENOUS; SUBCUTANEOUS at 08:01

## 2023-01-18 RX ADMIN — Medication 1 CAPSULE: at 08:01

## 2023-01-18 RX ADMIN — INSULIN ASPART 14 UNITS: 100 INJECTION, SOLUTION INTRAVENOUS; SUBCUTANEOUS at 12:01

## 2023-01-18 RX ADMIN — SENNOSIDES AND DOCUSATE SODIUM 1 TABLET: 50; 8.6 TABLET ORAL at 08:01

## 2023-01-18 RX ADMIN — LIDOCAINE 1 PATCH: 50 PATCH CUTANEOUS at 12:01

## 2023-01-18 RX ADMIN — FAMOTIDINE 20 MG: 20 TABLET ORAL at 08:01

## 2023-01-18 RX ADMIN — INSULIN ASPART 4 UNITS: 100 INJECTION, SOLUTION INTRAVENOUS; SUBCUTANEOUS at 05:01

## 2023-01-18 RX ADMIN — FUROSEMIDE 40 MG: 40 TABLET ORAL at 08:01

## 2023-01-18 RX ADMIN — DIGOXIN 125 MCG: 125 TABLET ORAL at 08:01

## 2023-01-18 RX ADMIN — INSULIN ASPART 4 UNITS: 100 INJECTION, SOLUTION INTRAVENOUS; SUBCUTANEOUS at 12:01

## 2023-01-18 RX ADMIN — ERYTHROMYCIN: 5 OINTMENT OPHTHALMIC at 08:01

## 2023-01-18 RX ADMIN — THERA TABS 1 TABLET: TAB at 08:01

## 2023-01-18 RX ADMIN — OXYCODONE HYDROCHLORIDE 20 MG: 10 TABLET ORAL at 05:01

## 2023-01-18 RX ADMIN — FOLIC ACID 1 MG: 1 TABLET ORAL at 08:01

## 2023-01-18 RX ADMIN — INSULIN ASPART 1 UNITS: 100 INJECTION, SOLUTION INTRAVENOUS; SUBCUTANEOUS at 08:01

## 2023-01-18 RX ADMIN — ASPIRIN 81 MG 81 MG: 81 TABLET ORAL at 08:01

## 2023-01-18 RX ADMIN — OXYCODONE HYDROCHLORIDE 20 MG: 10 TABLET ORAL at 10:01

## 2023-01-18 RX ADMIN — PREDNISONE 10 MG: 10 TABLET ORAL at 08:01

## 2023-01-18 RX ADMIN — INSULIN DETEMIR 5 UNITS: 100 INJECTION, SOLUTION SUBCUTANEOUS at 08:01

## 2023-01-18 RX ADMIN — ZINC SULFATE 220 MG (50 MG) CAPSULE 220 MG: CAPSULE at 08:01

## 2023-01-18 NOTE — PROGRESS NOTES
Patient back from appointments, aaox4, no recommendations in pt's folder. Pt instructed to call prn, call light in reach.

## 2023-01-18 NOTE — PT/OT/SLP PROGRESS
"Physical Therapy      Patient Name:  Nithin Wagner   MRN:  [id number]    Patient not seen today secondary to Pain, Out of hospital appointment (1.Pt with c/o 7/10 pain "in all my joints" at 9:17 am.  PTA unable to make another attempt in late morning; 2. Pt away for appointment in pm.). Will follow-up tomorrow.    "

## 2023-01-18 NOTE — PLAN OF CARE
Patient is set to discharge on 01/19/23 at 1:30PM. Patient discharging to home with family via personal car. Patient set up with [facility]. Patient declined all recommended DME at this time.

## 2023-01-18 NOTE — PROGRESS NOTES
Subjective:      Chief Complaint: Diabetes (DM2)    HPI: Nithin Wagner is a 67 y.o. male who is here for a follow-up evaluation for diabetes. Last seen 6/27/2022. Had recommended f/u later in 2022, not done.    Patient reported in 2007, had heart issues (atrial myxoma), surgery, issues with anesthesia. Then had pain, f/u showed lung issues, then surgery 2008. Later RA, on mtx, other meds. Including steroids.   - currently on 20 mg prednisone per day (potentially decreasing to 10 -> 5 in the near future) seeing rheumatology    With regards to the diabetes:  Diagnosed with T2DM since around [id number]. Had symptoms, sugar 300. Metformin initially, other pills, GLP1, then lately insulin.     Known complications:    Retinopathy? Denies    Nephropathy? No    Neuropathy? No    CAD? Yes, hx stents. Also HFpEF    CVA? TIA with myxoma, denies CVA in the past     Current regimen:   Basal insulin: Tresiba 44 units daily   Prandial insulin: Novolog 14 units TIDWM plus scale.   Had been using InPen system    Missed doses? denies     Prior treatments:    onglyza   metformin. Wasn't helping as much   other meds   Trulicity: 4.5 mg/week. Tuesdays. Stopped in the hospital    Self-Monitored blood glucose.  # times a day testing: was on dexcom CGM, not lately in SNF  Log reviewed: reviewed    80s-300s.  Mostly upper 100s-200s.    Hypoglycemic events? occasional    Working on diet    Current Symptoms  No   Yes  [x]    []  Polydipsia  []    [x]  Polyuria, sometimes  []    [x]  Vision changes  []    [x]  Nausea  [x]    []  Diarrhea  [x]    []  Weight gain  []    [x]  Weight loss. 40 lbs    +constipation. Linzess improves that -> diarrhea  +Fatigue.    Of note, pt in hospital for several weeks. Then SNF/rehab, pending discharge soon.  Tiffanie flores, s/p surgeries    Lower libido. Normal testosterone 2022.    Diabetes Management Status    Statin: Not taking  ACE/ARB: Taking     Screening or Prevention Patient's value Goal  Complete/Controlled?   HgA1C Testing and Control   Lab Results   Component Value Date    HGBA1C 7.6 (H) 11/15/2022      q6months/Less than 7.5% no   Lipid profile : 03/02/2022 Annually Yes   LDL control Lab Results   Component Value Date    LDLCALC 99.2 03/02/2022    Annually/Less than 100 mg/dl  yes   Nephropathy screening Lab Results   Component Value Date    MICALBCREAT 22.0 07/21/2021     Lab Results   Component Value Date    CREATININE 0.8 01/17/2023     Lab Results   Component Value Date    PROTEINUA Trace (A) 01/06/2023    Annually no   Blood pressure BP Readings from Last 1 Encounters:   01/18/23 (!) 145/68    Less than 140/90 no   Dilated retinal exam : 09/05/2017 Annually No   Foot exam   : 05/06/2021 Annually No     Reviewed past medical, family, social history and updated as appropriate.     Review of Systems  As above    Objective:     Vitals:    01/18/23 1457   BP: 127/65   Pulse: 72     Prior vitals:  BP Readings from Last 5 Encounters:   01/18/23 (!) 145/68   01/18/23 127/65   01/18/23 102/62   12/31/22 124/76   12/01/22 139/73     Physical Exam  Constitutional:       General: He is not in acute distress.  Pulmonary:      Effort: Pulmonary effort is normal.     Wt Readings from Last 10 Encounters:   01/15/23 0329 94.6 kg (208 lb 8.9 oz)   01/10/23 0700 93.3 kg (205 lb 11 oz)   01/09/23 0605 92.8 kg (204 lb 9.4 oz)   01/08/23 0551 93.2 kg (205 lb 7.5 oz)   01/07/23 0551 94.8 kg (208 lb 15.9 oz)   01/06/23 0330 94.8 kg (209 lb)   01/03/23 1640 92.8 kg (204 lb 9.4 oz)   01/03/23 0523 92.8 kg (204 lb 9.4 oz)   12/31/22 1735 93.9 kg (207 lb 0.2 oz)   01/18/23 1457 95 kg (209 lb 7 oz)   01/18/23 1420 95.3 kg (210 lb)   12/31/22 0436 100.2 kg (220 lb 14.4 oz)   12/30/22 0611 99.6 kg (219 lb 9.3 oz)   12/29/22 0630 100.7 kg (222 lb 0.1 oz)   12/28/22 0614 102.5 kg (225 lb 15.5 oz)   12/27/22 0657 102.7 kg (226 lb 6.6 oz)   12/26/22 0526 104.7 kg (230 lb 13.2 oz)   12/25/22 0612 103.8 kg (228 lb 13.4 oz)    12/24/22 0530 104.9 kg (231 lb 4.2 oz)   12/23/22 0536 104.9 kg (231 lb 4.2 oz)   12/21/22 0600 105.4 kg (232 lb 5.8 oz)   12/20/22 0530 106.6 kg (235 lb 0.2 oz)   12/19/22 0624 107.5 kg (236 lb 15.9 oz)   12/18/22 0435 111.1 kg (244 lb 14.9 oz)   12/17/22 0600 109.5 kg (241 lb 6.5 oz)   12/16/22 0515 108.5 kg (239 lb 3.2 oz)   12/15/22 0427 107.2 kg (236 lb 5.3 oz)   12/14/22 0400 108.5 kg (239 lb 3.2 oz)   12/13/22 0548 110 kg (242 lb 8.1 oz)   12/12/22 0438 109.3 kg (240 lb 15.4 oz)   12/11/22 0613 111.2 kg (245 lb 2.4 oz)   12/10/22 0704 111.1 kg (244 lb 14.9 oz)   12/10/22 0555 111.1 kg (244 lb 14.9 oz)   12/09/22 0632 113.8 kg (250 lb 14.1 oz)   12/08/22 0655 113.8 kg (250 lb 14.1 oz)   12/07/22 0600 111.8 kg (246 lb 7.6 oz)   12/06/22 0431 112 kg (246 lb 14.6 oz)   12/05/22 0614 116.2 kg (256 lb 2.8 oz)   12/04/22 0600 113.5 kg (250 lb 3.6 oz)   12/03/22 0502 116.5 kg (256 lb 13.4 oz)   12/02/22 0451 108.8 kg (239 lb 13.8 oz)   12/01/22 1930 108.8 kg (239 lb 13.8 oz)   12/01/22 1315 114.1 kg (251 lb 8.7 oz)   11/25/22 1626 114.1 kg (251 lb 8.7 oz)   11/15/22 2001 114.1 kg (251 lb 8.7 oz)   11/15/22 1720 109.8 kg (242 lb)   11/14/22 1849 110 kg (242 lb 9.6 oz)   11/02/22 1547 111.4 kg (245 lb 11.2 oz)   11/01/22 1001 (P) 112.7 kg (248 lb 7.3 oz)   10/28/22 1541 113.4 kg (250 lb)   10/26/22 2210 113.4 kg (250 lb)     Lab Results   Component Value Date    HGBA1C 7.6 (H) 11/15/2022     Lab Results   Component Value Date    CHOL 178 03/02/2022    HDL 58 03/02/2022    LDLCALC 99.2 03/02/2022    TRIG 104 03/02/2022    CHOLHDL 32.6 03/02/2022     Lab Results   Component Value Date     01/17/2023    K 4.0 01/17/2023     01/17/2023    CO2 25 01/17/2023     (H) 01/17/2023    BUN 12 01/17/2023    CREATININE 0.8 01/17/2023    CALCIUM 8.7 01/17/2023    PROT 6.4 01/06/2023    ALBUMIN 2.7 (L) 01/06/2023    BILITOT 0.4 01/06/2023    ALKPHOS 90 01/06/2023    AST 23 01/06/2023    ALT 20 01/06/2023     ANIONGAP 9 01/17/2023    ESTGFRAFRICA >60 07/21/2022    EGFRNONAA >60 07/21/2022    TSH 4.602 (H) 10/26/2022      Lab Results   Component Value Date    MICALBCREAT 22.0 07/21/2021     Assessment/Plan:     Type 2 diabetes mellitus with hyperglycemia, with long-term current use of insulin  Reviewed goals of therapy - to get the best control we can without hypoglycemia. Goal <7.5   - last a1c pretty much at goal, been a while   lately sugar high in hospital/SNF, dealing with infection, abx, steroids, etc.    Medication changes:    continue same.    45 units tresiba daily   continue novolog with meals, adjustments based on carb content and pre-meal glucose    Reviewed patient's current insulin regimen. Clarified proper insulin dose and timing in relation to meals, etc. Insulin injection sites and proper rotation instructed.   - reviewed dose adjustments as well.   -Advised frequent self blood glucose monitoring. Patient encouraged to document glucose results and bring them to every clinic visit    -Hypoglycemia precautions discussed. Instructed on precautions before driving.    -Close adherence to lifestyle changes recommended.    -Periodic follow ups for eye evaluations, foot care suggested.    Pt interested in pump, omnipod. On 4 shots/day, reasonable.  Prescription sent in will see if covered at the pharmacy vs [employer] company.   f/u with DM education  Starting settings TBD pending post-discharge adjustments/how he responds once on lower steroids and back in his home environment      Essential hypertension  bp controlled  continue regimen  F/u with PCP, monitor      Fatigue  Recheck thyroid with next labs. Had subclinical hypothyroidism on last check    Recheck testosterone later this year too, holding off for now given recent illness/hospitalization   otherwise keep f/u with PCP, consider other potential causes    Follow up in about 4 months (around 5/18/2023) for lab review, further monitoring.      Kevin Lares,  MD  Endocrinology

## 2023-01-18 NOTE — PROGRESS NOTES
Informed per  patient was picked up for cardiology and endocrine apppointments via w/c-van per hospital transportation.

## 2023-01-18 NOTE — PT/OT/SLP PROGRESS
Occupational Therapy    Not Seen    Nithin Wagner  MRN: [id number]  Room/Bed: [place] [address]/[id number] A    Pt not seen today by OT secondary to pt refusing physical therapy in AM and pt unavailable in PM for doctors apt. Will follow up plan of care next scheduled session.     JAHAIRA Zheng  1/18/2023

## 2023-01-18 NOTE — ASSESSMENT & PLAN NOTE
Recheck thyroid with next labs. Had subclinical hypothyroidism on last check    Recheck testosterone later this year too, holding off for now given recent illness/hospitalization   otherwise keep f/u with PCP, consider other potential causes

## 2023-01-18 NOTE — ASSESSMENT & PLAN NOTE
Reviewed goals of therapy - to get the best control we can without hypoglycemia. Goal <7.5   - last a1c pretty much at goal, been a while   lately sugar high in hospital/SNF, dealing with infection, abx, steroids, etc.    Medication changes:    continue same.    45 units tresiba daily   continue novolog with meals, adjustments based on carb content and pre-meal glucose    Reviewed patient's current insulin regimen. Clarified proper insulin dose and timing in relation to meals, etc. Insulin injection sites and proper rotation instructed.   - reviewed dose adjustments as well.   -Advised frequent self blood glucose monitoring. Patient encouraged to document glucose results and bring them to every clinic visit    -Hypoglycemia precautions discussed. Instructed on precautions before driving.    -Close adherence to lifestyle changes recommended.    -Periodic follow ups for eye evaluations, foot care suggested.    Pt interested in pump, omnipod. On 4 shots/day, reasonable.  Prescription sent in will see if covered at the pharmacy vs [employer] company.   f/u with DM education  Starting settings TBD pending post-discharge adjustments/how he responds once on lower steroids and back in his home environment

## 2023-01-18 NOTE — PROGRESS NOTES
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 male with PMH of DM2, CAD, HTN, and HLD who presents to [facility] for treatment of sepsis and debility. Patient presented to urgent care with complaints of scrotal pain, redness and fever. He was advised to present to ED. Patient presented to the ED the next day. In ED he reported, fevers and chills at home, nausea, decreased PO intake, and multiple syncopal episodes with +incontinence of stool. 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  Empiric initiated with cefepime, clindamycin, metronidazole, and vancomycin.  Urology consulted and he underwent emergent excision and debridement of necrotic tissue on 11/15. Repeat washout performed 11/17. Improved and stepped down from ICU 11/18.  Bedside debridement performed on 11/22.  ID consulted and antibiotics regimen changed to cefepime and Flagyl known.  Wound cultures with Anaerococcus and Prevotella species.  Renal function normalized.  Return of SVT and Cardiology consulted with start of metoprolol. Patient deemed medically stable. Transferred to [facility] on 12/1/22. He completed course of cefepime and flagyl for treatment of kami's gangrene. Discharged to [facility] on 12/31/22.  Today, he has multiple concerns. He would like to make sure he has follow ups scheduled with cardiology, infectious disease, and rheumatology. Also reporting occasional dizziness. He refused his lasix this morning. Reports poor po intake and loss of 50 pounds over the past few months. Will hold lasix for now. He  also is requesting to stop his prednisone, will discontinue. Reports having some sinus congestion and pressure in his ears. Currently on course of zyrtec.      Interval history:   24 hr vital sign ranges listed below.  bilateral lower extremity edema slowly improving with Lasix.  Pain is better controled. Pt is performing his own dressing changes in preparation for DC. Patient denies shortness of breath, abdominal discomfort, nausea, or vomiting.  Patient reports an adequate appetite.  Patient denies dysuria.  Patient reports having regular bowel movements.  Patient progessing with PT/OT. Continuing to follow and treat all acute and chronic conditions.    Past Medical History: Patient has a past medical history of Arthritis, Atrial myxoma, CHF (congestive heart failure), Coronary atherosclerosis, Diabetes mellitus, type 2, Difficult intubation, Hepatitis B, Hyperlipidemia, Hypertension, Non-alcoholic fatty liver disease, Rheumatoid arthritis, Rheumatoid arthritis flare (07/12/2021), Stroke, and Systolic heart failure.    Past Surgical History: Patient has a past surgical history that includes Pleura biopsy; Resection of atrial myxoma (2007); Lung lobectomy (Right, 2008); Coronary angiography (N/A, 3/10/2021); Coronary stent placement (03/10/2021); Incision and drainage of scrotum (N/A, 11/15/2022); and Incision and drainage of scrotum (N/A, 11/17/2022).    Social History: Patient reports that he has been smoking cigarettes. He has a 35.00 pack-year smoking history. He has never used smokeless tobacco. He reports that he does not currently use alcohol. He reports that he does not use drugs.    Family History: family history includes Cancer in his sister; Diabetes type I in his father; Diabetes type II in his mother; Hodgkin's lymphoma in his mother; Kidney failure in his father.    Allergies: Patient is allergic to enbrel [etanercept], nsaids (non-steroidal anti-inflammatory drug), statins-hmg-coa reductase  inhibitors, and pcn [penicillins].    ROS  Constitutional: Negative for fever   Eyes: Negative for blurred vision, double vision +left eye conjunctivitis   Respiratory: Negative for cough, shortness of breath.    Cardiovascular: Negative for chest pain, palpitations.  + leg swelling.   Gastrointestinal: Negative for abdominal pain, constipation, diarrhea, nausea, vomiting.   Genitourinary: Negative for dysuria, frequency   Musculoskeletal:  + generalized weakness. Negative for back pain and myalgias.   Skin: Negative for itching and rash.   Neurological: Negative for dizziness, headaches.   Psychiatric/Behavioral: Negative for depression. The patient is not nervous/anxious.      24 hour Vital Sign Range   Temp:  [97.7 °F (36.5 °C)-97.8 °F (36.6 °C)]   Pulse:  [64-78]   Resp:  [18-19]   BP: (102-145)/(62-76)   SpO2:  [96 %-97 %]     Current BMI: Body mass index is 29.09 kg/m².    PEx  Constitutional: Patient appears debilitated.  No distress noted  HENT:   Head: Normocephalic and atraumatic.   Eyes: Pupils are equal, round  Neck: Normal range of motion. Neck supple.   Cardiovascular: Normal rate, regular rhythm and normal heart sounds.    Pulmonary/Chest: Effort normal and breath sounds are clear  Abdominal: Soft. Bowel sounds are normal.   Musculoskeletal: Normal range of motion.   Neurological: Alert and oriented to person, place, and time.   Psychiatric: Normal mood and affect. Behavior is normal.   Skin: Skin is warm and dry.  Scrotal wound     Incision/Site 11/17/22 1639 Left Scrotum lateral vertical     Side: Left  Location: Scrotum  Orientation: lateral  Incision Type: vertical   Incision WDL ex   Dressing Appearance Intact;Clean;Moist drainage   Drainage Amount Scant   Drainage Characteristics/Odor Tan;Creamy   Appearance Pink;Moist;Epithelialization   Red (%), Wound Tissue Color 100 %   Periwound Area Intact;Dry;Pink   Wound Edges Open   Wound Length (cm) 6 cm   Wound Width (cm) 2.4 cm   Wound Depth (cm) 0.2  cm   Wound Volume (cm^3) 2.88 cm^3   Wound Surface Area (cm^2) 14.4 cm^2     No results for input(s): GLUCOSE, NA, K, CL, CO2, BUN, CREATININE, MG in the last 24 hours.    Invalid input(s):  CALCIUM        No results for input(s): WBC, RBC, HGB, HCT, PLT, MCV, MCH, MCHC in the last 24 hours.          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 Moisten gauze with VASHE and wring out excess liquid. Apply to wound bed tucking into all wound tunnels and undermining so that all aspects of the wound bed are covered. Add additional to lightly fill the wound bed (not tightly pack). Cover with telfa island dressing cut in half- then  Abd pads/ mesh underwear. Change per BID or if dressing comes loose or saturated.  Wound care nurse following  1/6-Reports having an area of drainage around his perineum. Assessed area on exam today, no redness or drainage noted. No fever or leukocytosis. doxycycline 100mg bid as patient will be high risk for infection with the resumption of steroids.- end date 5 days  - 1/13 stable, continue oxycodone IR to 15 or 20 mg q.4 hours PRN.  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 Moisten gauze with VASHE and wring out excess liquid. Apply to wound bed tucking into all wound tunnels and undermining so that all aspects of the wound bed are covered. Add additional to lightly fill the wound bed (not tightly pack). Cover with telfa island dressing  cut in half- then  Abd pads/ mesh underwear. Change per BID or if dressing comes loose or saturated.  Wound care nurse following  1/6-Reports having an area of drainage around his perineum. Assessed area on exam today, no redness or drainage noted. No fever or leukocytosis.  doxycycline 100mg bid as patient will be high risk for infection with the resumption of steroids.- end date 5 days     Rheumatoid Arthritis  1/6-Elevated inflammatory markers, sed rate 78, CRP 19.7, CCP antibodies 511, rheumatoid factor 914.  Discussed resuming prednisone. Patient agreeable to resume prednisone. Discussed with patient's rheumatologist, recommended started prednisone 20mg x 7 days, then 10mg x 7 days, then going down to 5mg    Moderate Protein-Calorie Malnutrition  Related to (etiology):  Inability to meet physiological needs PO in the presence of sepsis     Signs and Symptoms (as evidenced by):  Energy Intake: <75% of estimated energy requirement for > one month     Muscle Mass Depletion: mild depletion of clavicle region and interosseous muscle , moderate loss to hands and thighs  Weight Loss: 18%x < one month   Regular diet  Commercial beverage(calories,protein) boost plus TID chocolate  Vitamin/mineral supplement therapy- MVI  Initiate order for zinc sulfate daily x14 days, ascorbic acid 500 mg x 30 days, and probiotic daily per RD recommendations     Type 2 Diabetes mellitus with hyperglycemia, with long-term current use of insulin  Continue insulin aspart 14 units tid with meals  Insulin detemir 44 units daily  Insulin detemir 5 units nightly  Accuchecks ac and hs  SSI     Coronary artery disease  Continue asa daily     Chronic diastolic heart failure  Continue digoxin 125mcg daily  Continue diltiazem 120mg bid  Continue metoprolol 50 mg bid  Holding lasix due to poor po intake/ intermittent dizziness     Hypokalemia  On Kcl 40mEq daily  May need to d/c now that we are hold lasix, monitor closely      Conjunctivitis  Continue gentamicin drops tid     Otitis media with effusion     Hyperlipidemia  Continue zetia 10mg daily     Dysphoric mood  Continue lexapro 20mg daily        Anticipate disposition:    Home with home health          Anticipate disposition:  Home with home health      Follow-up needed during SNF stay-    Follow-up needed after discharge from SNF: PCP, ID     Future Appointments   Date Time Provider Department Center   1/18/2023  3:00 PM Kevin Lares MD [facility] [id number] [facility]   2/1/2023  4:00 PM Sylvie Dc MD [facility] ID Devin kristian   2/2/2023  3:00 PM Shemar Dempsey MD [facility] [id number] [facility]   2/17/2023  8:00 AM Korin Alcala MD [facility] RHEUM Devin y   3/1/2023  2:30 PM Lalitha Stroud RN, CDE [facility]         Wandy Veliz, NP  Department of [facility] Medicine   [facility]     DOS: 1/18/2023       Patient note was created using MModal Dictation.  Any errors in syntax or even information may not have been identified and edited on initial review prior to signing this note.

## 2023-01-18 NOTE — PATIENT INSTRUCTIONS
For diabetes:    See if omnipod 5 is covered. It has dexcom integration    For now, continue insulins

## 2023-01-18 NOTE — PROGRESS NOTES
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  Has been in skilled nursing to get stronger.  Recovering.  Anticipate discharge tomorrow.  Has lost 35 lb.  Started taking Lasix today because of lower extremity edema.  Reports that his blood pressure has been running low.  Wants to cut back on metoprolol because it makes him feel sluggish.     Medications  No current facility-administered medications for this visit.     Current Outpatient Medications   Medication Sig Dispense Refill    [START ON 1/19/2023] ascorbic acid, vitamin C, (VITAMIN C) 500 MG tablet Take 1 tablet (500 mg total) by mouth once daily. 30 tablet 0    diltiaZEM (CARDIZEM CD) 120 MG Cp24 Take 1 capsule (120 mg total) by mouth every 12 (twelve) hours. 60 capsule 11    metoprolol tartrate (LOPRESSOR) 50 MG tablet Take 1 tablet (50 mg total) by mouth every 12 (twelve) hours. 60 tablet 11    NOVOLOG PENFILL U-100 INSULIN 100 unit/mL Crtg INJECT 14 UNITS INTO THE SKIN THREE TIMES A DAY WITH MEALS. 30 each 11    oxyCODONE (ROXICODONE) 20 mg Tab immediate release tablet Take 1 tablet (20 mg total) by mouth every 4 (four) hours as needed for Pain. 42 tablet 0    [START ON 1/19/2023] predniSONE (DELTASONE) 10 MG tablet Take 1 tablet (10 mg total) by mouth once daily. for 1 day 1 tablet 0    [START ON 1/20/2023] predniSONE (DELTASONE) 5 MG tablet Take 1 tablet (5 mg total) by mouth once daily. 30 tablet 1    senna-docusate 8.6-50 mg (PERICOLACE) 8.6-50 mg per tablet Take 1 tablet by mouth 2 (two) times daily.      vortioxetine (TRINTELLIX) 10 mg Tab Take 1 tablet (10 mg total) by mouth once daily. 30 tablet 0    [START ON 1/19/2023] zinc sulfate (ZINCATE) 50 mg zinc (220 mg) capsule Take 1 capsule (220 mg total) by mouth once daily. for 14 days 14 capsule 0     Facility-Administered Medications Ordered in Other Visits    Medication Dose Route Frequency Provider Last Rate Last Admin    acetaminophen tablet 650 mg  650 mg Oral Q4H PRN Marisela Raymond, NP   650 mg at 01/03/23 0513    albuterol-ipratropium 2.5 mg-0.5 mg/3 mL nebulizer solution 3 mL  3 mL Nebulization Q4H PRN Marisela Raymond, BASIA        ascorbic acid (vitamin C) tablet 500 mg  500 mg Oral Daily Polina Webber NP   500 mg at 01/18/23 0831    aspirin chewable tablet 81 mg  81 mg Oral Daily Marisela Raymond, NP   81 mg at 01/18/23 0831    benzonatate capsule 100 mg  100 mg Oral TID PRN Marisela Raymond, BASIA        bisacodyL suppository 10 mg  10 mg Rectal Daily PRN Marisela Raymond, BASIA        calcium carbonate 200 mg calcium (500 mg) chewable tablet 500 mg  500 mg Oral BID PRN Marisela Raymond, NP        cetirizine tablet 10 mg  10 mg Oral Daily Marisela Raymond, NP   10 mg at 01/18/23 0826    dextrose 10% bolus 125 mL 125 mL  12.5 g Intravenous PRN Marisela Raymond, NP        dextrose 10% bolus 250 mL 250 mL  25 g Intravenous PRN Marisela Raymond, NP        diclofenac sodium 1 % gel 4 g  4 g Topical (Top) BID Conrado Nguyễn MD   4 g at 01/18/23 0833    digoxin tablet 125 mcg  125 mcg Oral Daily Marisela Raymond, NP   125 mcg at 01/18/23 0831    diltiaZEM 24 hr capsule 120 mg  120 mg Oral Q12H Marisela Raymond, NP   120 mg at 01/18/23 0827    erythromycin 5 mg/gram (0.5 %) ophthalmic ointment   Left Eye QHS Wandy Veliz NP   Given at 01/17/23 2100    EScitalopram oxalate tablet 20 mg  20 mg Oral QHS Marisela Raymond, NP   20 mg at 01/17/23 2100    ezetimibe tablet 10 mg  10 mg Oral Daily Marisela Raymond, NP   10 mg at 01/18/23 0827    famotidine tablet 20 mg  20 mg Oral BID Marisela Raymond, NP   20 mg at 01/18/23 0831    fluticasone propionate 50 mcg/actuation nasal spray 100 mcg  2 spray Each Nostril Daily Wandy Veliz, NP   100 mcg at 01/16/23 0939    folic acid tablet 1 mg  1 mg Oral Daily Marisela Raymond NP   1 mg at 01/18/23 0831     furosemide tablet 40 mg  40 mg Oral Daily Wandy Veliz, NP   40 mg at 01/18/23 0831    glucagon (human recombinant) injection 1 mg  1 mg Intramuscular PRN Marisela Raymond, NP        glucose chewable tablet 16 g  16 g Oral PRN Marisela Raymond, NP        glucose chewable tablet 24 g  24 g Oral PRN Marisela Raymond, NP        hydrOXYzine HCL tablet 25 mg  25 mg Oral TID PRN Marisela Raymond, NP   25 mg at 01/18/23 0826    insulin aspart U-100 pen 1-10 Units  1-10 Units Subcutaneous QID (AC + HS) PRN Marisela Raymond, NP   4 Units at 01/18/23 1224    insulin aspart U-100 pen 14 Units  14 Units Subcutaneous TIDWM Marisela Raymond, NP   14 Units at 01/18/23 1223    insulin detemir U-100 pen 44 Units  44 Units Subcutaneous Daily Marisela Raymond NP   44 Units at 01/18/23 0822    insulin detemir U-100 pen 5 Units  5 Units Subcutaneous QHS Marisela Raymond, NP   5 Units at 01/17/23 2100    Lactobacillus rhamnosus GG capsule 1 capsule  1 capsule Oral Daily Polina Webber NP   1 capsule at 01/18/23 0827    LIDOcaine 5 % patch 1 patch  1 patch Transdermal Q24H Marisela Raymond NP   1 patch at 01/18/23 1247    linaCLOtide capsule 72 mcg  72 mcg Oral Every other day Wandy Veliz, NP   72 mcg at 01/15/23 1417    melatonin tablet 6 mg  6 mg Oral Nightly PRN Marisela Raymond, NP   6 mg at 01/13/23 2147    metoprolol tartrate (LOPRESSOR) tablet 50 mg  50 mg Oral Q12H Marisela Raymond, NP   50 mg at 01/18/23 0832    multivitamin tablet  1 tablet Oral Daily Marisela Raymond, NP   1 tablet at 01/18/23 0827    naloxone 0.4 mg/mL injection 0.02 mg  0.02 mg Intravenous PRN Marisela Raymond, NP        ondansetron disintegrating tablet 4 mg  4 mg Oral Q6H PRN Marisela Raymond, NP   4 mg at 01/07/23 0654    oxyCODONE immediate release tablet 15 mg  15 mg Oral Q4H PRN Wandy Veliz NP   15 mg at 01/17/23 1721    oxyCODONE immediate release tablet Tab 20 mg  20 mg Oral Q4H PRN Wandy Veliz NP   20 mg at 01/18/23  1028    potassium chloride SA CR tablet 20 mEq  20 mEq Oral Daily Wandy Veliz NP   20 mEq at 01/18/23 0831    predniSONE tablet 10 mg  10 mg Oral Daily Polina Webber NP   10 mg at 01/18/23 0831    [START ON 1/20/2023] predniSONE tablet 5 mg  5 mg Oral Daily Polina Webber NP        promethazine tablet 25 mg  25 mg Oral Q6H PRN Marisela Raymond NP        senna-docusate 8.6-50 mg per tablet 1 tablet  1 tablet Oral BID Marisela Raymond NP   1 tablet at 01/13/23 2147    zinc sulfate capsule 220 mg  220 mg Oral Daily Polina Webber NP   220 mg at 01/18/23 0827        History  Past Medical History:   Diagnosis Date    Arthritis     Atrial myxoma     CHF (congestive heart failure)     Coronary atherosclerosis     Diabetes mellitus, type 2     Difficult intubation     Hepatitis B     Hyperlipidemia     Hypertension     Non-alcoholic fatty liver disease     Rheumatoid arthritis     Rheumatoid arthritis flare 07/12/2021    Stroke     TIA    Systolic heart failure      Past Surgical History:   Procedure Laterality Date    CORONARY ANGIOGRAPHY N/A 3/10/2021    Procedure: ANGIOGRAM, CORONARY ARTERY - right radial;  Surgeon: Shemar Dempsey MD;  Location: [facility] CATH LAB;  Service: Cardiology;  Laterality: N/A;    CORONARY STENT PLACEMENT  03/10/2021    prox-mid RCA [place] 4.5 x 26 mm, 4.5 x 12 mm    INCISION AND DRAINAGE OF SCROTUM N/A 11/15/2022    Procedure: INCISION AND DRAINAGE, SCROTUM;  Surgeon: William Hatch MD;  Location: [facility] OR;  Service: Urology;  Laterality: N/A;    INCISION AND DRAINAGE OF SCROTUM N/A 11/17/2022    Procedure: INCISION AND DRAINAGE, SCROTUM;  Surgeon: William Hatch MD;  Location: [facility] OR;  Service: Urology;  Laterality: N/A;    LUNG LOBECTOMY Right 2008    RUL lobectomy after removal of atrial myxoma    PLEURA BIOPSY      RESECTION OF ATRIAL MYXOMA  2007     Social History     Socioeconomic History    Marital status: Single   Occupational History    Occupation: Biomedical  , respiratory therapist, [employer]     Comment: Retired   Tobacco Use    Smoking status: Some Days     Packs/day: 1.00     Years: 35.00     Pack years: 35.00     Types: Cigarettes    Smokeless tobacco: Never   Substance and Sexual Activity    Alcohol use: Not Currently    Drug use: No    Sexual activity: Never     Social Determinants of Health     Financial Resource Strain: High Risk    Difficulty of Paying Living Expenses: Hard   Food Insecurity: No Food Insecurity    Worried About Running Out of Food in the Last Year: Never true    Ran Out of Food in the Last Year: Never true   Transportation Needs: Unmet Transportation Needs    Lack of Transportation (Medical): Yes    Lack of Transportation (Non-Medical): Yes   Physical Activity: Inactive    Days of Exercise per Week: 0 days    Minutes of Exercise per Session: 0 min   Stress: Stress Concern Present    Feeling of Stress : To some extent   Social Connections: Socially Isolated    Frequency of Communication with Friends and Family: More than three times a week    Frequency of Social Gatherings with Friends and Family: More than three times a week    Attends [employer] Services: Never    Active Member of Clubs or Organizations: No    Attends Club or Organization Meetings: Never    Marital Status: Never    Housing Stability: High Risk    Unable to Pay for Housing in the Last Year: Yes    Number of Places Lived in the Last Year: 1    Unstable Housing in the Last Year: No     Family History   Problem Relation Age of Onset    Hodgkin's lymphoma Mother     Diabetes type II Mother     Kidney failure Father     Diabetes type I Father     Cancer Sister         Allergies  Review of patient's allergies indicates:   Allergen Reactions    Enbrel [etanercept] Shortness Of Breath     CHF    Nsaids (non-steroidal anti-inflammatory drug) Other (See Comments)     hypertention  Other reaction(s): Other (See Comments)  hypertention  HTN    Statins-hmg-coa reductase  inhibitors Other (See Comments)     Heart arrythemias  Other reaction(s): Other (See Comments)  Heart arrythemias  Joint pain and cardiac arrythmias    Pcn [penicillins] Rash       Review of Systems   Review of Systems   Constitutional: Positive for weight loss. Negative for malaise/fatigue.   Eyes:  Negative for visual disturbance.   Cardiovascular:  Negative for chest pain, claudication, cyanosis, dyspnea on exertion, irregular heartbeat, leg swelling, near-syncope, orthopnea, palpitations, paroxysmal nocturnal dyspnea and syncope.   Respiratory:  Negative for hemoptysis, shortness of breath, sleep disturbances due to breathing and wheezing.    Hematologic/Lymphatic: Negative for bleeding problem. Does not bruise/bleed easily.   Skin:  Negative for poor wound healing.   Musculoskeletal:  Negative for muscle cramps and myalgias.   Gastrointestinal:  Negative for abdominal pain, anorexia, diarrhea, heartburn, hematemesis, hematochezia, melena, nausea and vomiting.   Genitourinary:  Negative for hematuria and nocturia.   Neurological:  Negative for excessive daytime sleepiness, dizziness, focal weakness, light-headedness and weakness.     Physical Exam  Vitals:    01/18/23 1420   BP: 102/62   Pulse: 64     Wt Readings from Last 1 Encounters:   01/15/23 94.6 kg (208 lb 8.9 oz)     Physical Exam  Constitutional:       General: He is not in acute distress.     Appearance: He is obese. He is not toxic-appearing or diaphoretic.   HENT:      Head: Normocephalic and atraumatic.   Eyes:      General: No scleral icterus.     Conjunctiva/sclera: Conjunctivae normal.   Neck:      Thyroid: No thyromegaly.      Vascular: No carotid bruit, hepatojugular reflux or JVD.   Cardiovascular:      Rate and Rhythm: Normal rate and regular rhythm. No extrasystoles are present.     Chest Wall: PMI is not displaced.      Pulses:           Carotid pulses are 2+ on the right side and 2+ on the left side.       Radial pulses are 2+ on the right  side and 2+ on the left side.        Dorsalis pedis pulses are 2+ on the right side and 2+ on the left side.        Posterior tibial pulses are 2+ on the right side and 2+ on the left side.      Heart sounds: S1 normal and S2 normal. No murmur heard.    Gallop present. S4 sounds present. No S3 sounds.   Pulmonary:      Effort: No accessory muscle usage or respiratory distress.      Breath sounds: No decreased breath sounds, wheezing, rhonchi or rales.   Abdominal:      General: Bowel sounds are normal. There is no abdominal bruit.      Palpations: Abdomen is soft. There is no hepatomegaly, splenomegaly or pulsatile mass.      Tenderness: There is no abdominal tenderness.   Musculoskeletal:         General: No tenderness or deformity.      Right lower leg: No edema.      Left lower leg: No edema.   Skin:     General: Skin is warm and dry.      Coloration: Skin is not pale.      Nails: There is no clubbing.   Neurological:      General: No focal deficit present.      Mental Status: He is alert and oriented to person, place, and time.      Sensory: Sensation is intact.      Motor: Motor function is intact.   Psychiatric:         Speech: Speech normal.         Behavior: Behavior normal. Behavior is cooperative.       Labs  No results displayed because visit has over 200 results.      No results displayed because visit has over 200 results.      No results displayed because visit has over 200 results.      Hospital Outpatient Visit on 10/28/2022   Component Date Value Ref Range Status    BSA 10/28/2022 2.38  m2 Final    LA WIDTH 10/28/2022 3.60  cm Final    IVC diameter 10/28/2022 1.46  cm Final    Left Ventricular Outflow Tract Misti* 10/28/2022 0.61  cm/s Final    Left Ventricular Outflow Tract Misti* 10/28/2022 1.70  mmHg Final    PV PEAK VELOCITY 10/28/2022 0.70  cm/s Final    LVIDd 10/28/2022 4.25  3.5 - 6.0 cm Final    IVS 10/28/2022 1.42 (A)  0.6 - 1.1 cm Final    Posterior Wall 10/28/2022 1.11 (A)  0.6 - 1.1 cm Final     LVIDs 10/28/2022 3.07  2.1 - 4.0 cm Final    FS 10/28/2022 28  28 - 44 % Final    LA volume 10/28/2022 64.93  cm3 Final    LV mass 10/28/2022 196.04  g Final    LA size 10/28/2022 3.93  cm Final    TAPSE 10/28/2022 1.74  cm Final    Left Ventricle Relative Wall Thick* 10/28/2022 0.52  cm Final    AV mean gradient 10/28/2022 2  mmHg Final    AV valve area 10/28/2022 2.81  cm2 Final    AV Velocity Ratio 10/28/2022 0.89   Final    AV index (prosthetic) 10/28/2022 0.88   Final    MV valve area p 1/2 method 10/28/2022 3.83  cm2 Final    E/A ratio 10/28/2022 0.63   Final    E wave deceleration time 10/28/2022 198.06  msec Final    IVRT 10/28/2022 55.36  msec Final    Pulm vein S/D ratio 10/28/2022 0.62   Final    LVOT diameter 10/28/2022 2.02  cm Final    LVOT area 10/28/2022 3.2  cm2 Final    LVOT peak ildefonso 10/28/2022 0.93  m/s Final    LVOT peak VTI 10/28/2022 13.50  cm Final    Ao peak ildefonso 10/28/2022 1.04  m/s Final    Ao VTI 10/28/2022 15.4  cm Final    LVOT stroke volume 10/28/2022 43.24  cm3 Final    AV peak gradient 10/28/2022 4  mmHg Final    MV Peak E Ildefonso 10/28/2022 0.53  m/s Final    TR Max Ildefonso 10/28/2022 2.01  m/s Final    MV stenosis pressure 1/2 time 10/28/2022 57.44  ms Final    MV Peak A Ildefonso 10/28/2022 0.84  m/s Final    PV Peak S Ildefonso 10/28/2022 0.31  m/s Final    PV Peak D Ildefonso 10/28/2022 0.50  m/s Final    LV Systolic Volume 10/28/2022 36.99  mL Final    LV Systolic Volume Index 10/28/2022 15.9  mL/m2 Final    LV Diastolic Volume 10/28/2022 80.96  mL Final    LV Diastolic Volume Index 10/28/2022 34.90  mL/m2 Final    LA Volume Index 10/28/2022 28.0  mL/m2 Final    LV Mass Index 10/28/2022 84  g/m2 Final    RA Major Axis 10/28/2022 4.13  cm Final    Left Atrium Minor Axis 10/28/2022 5.46  cm Final    Left Atrium Major Axis 10/28/2022 5.34  cm Final    Triscuspid Valve Regurgitation Pea* 10/28/2022 16  mmHg Final    LA Volume Index (Mod) 10/28/2022 26.3  mL/m2 Final    LA volume (mod) 10/28/2022 61.00  cm3  Final    RA Width 10/28/2022 2.40  cm Final    Right Atrial Pressure (from IVC) 10/28/2022 3  mmHg Final    EF 10/28/2022 55  % Final    TV rest pulmonary artery pressure 10/28/2022 19  mmHg Final   Admission on 10/26/2022, Discharged on 10/27/2022   Component Date Value Ref Range Status    BNP 10/26/2022 79  0 - 99 pg/mL Final    Values of less than 100 pg/ml are consistent with non-CHF populations.    WBC 10/26/2022 10.12  3.90 - 12.70 K/uL Final    RBC 10/26/2022 5.56  4.60 - 6.20 M/uL Final    Hemoglobin 10/26/2022 19.7 (H)  14.0 - 18.0 g/dL Final    Hematocrit 10/26/2022 54.6 (H)  40.0 - 54.0 % Final    MCV 10/26/2022 98  82 - 98 fL Final    MCH 10/26/2022 35.4 (H)  27.0 - 31.0 pg Final    MCHC 10/26/2022 36.1 (H)  32.0 - 36.0 g/dL Final    RDW 10/26/2022 12.8  11.5 - 14.5 % Final    Platelets 10/26/2022 195  150 - 450 K/uL Final    MPV 10/26/2022 11.2  9.2 - 12.9 fL Final    Immature Granulocytes 10/26/2022 1.5 (H)  0.0 - 0.5 % Final    Gran # (ANC) 10/26/2022 6.9  1.8 - 7.7 K/uL Final    Immature Grans (Abs) 10/26/2022 0.15 (H)  0.00 - 0.04 K/uL Final    Comment: Mild elevation in immature granulocytes is non specific and   can be seen in a variety of conditions including stress response,   acute inflammation, trauma and pregnancy. Correlation with other   laboratory and clinical findings is essential.      Lymph # 10/26/2022 2.1  1.0 - 4.8 K/uL Final    Mono # 10/26/2022 0.8  0.3 - 1.0 K/uL Final    Eos # 10/26/2022 0.1  0.0 - 0.5 K/uL Final    Baso # 10/26/2022 0.06  0.00 - 0.20 K/uL Final    nRBC 10/26/2022 0  0 /100 WBC Final    Gran % 10/26/2022 68.2  38.0 - 73.0 % Final    Lymph % 10/26/2022 21.0  18.0 - 48.0 % Final    Mono % 10/26/2022 8.1  4.0 - 15.0 % Final    Eosinophil % 10/26/2022 0.6  0.0 - 8.0 % Final    Basophil % 10/26/2022 0.6  0.0 - 1.9 % Final    Differential Method 10/26/2022 Automated   Final    Sodium 10/26/2022 135 (L)  136 - 145 mmol/L Final    Potassium 10/26/2022 4.3  3.5 - 5.1  mmol/L Final    Chloride 10/26/2022 99  95 - 110 mmol/L Final    CO2 10/26/2022 24  23 - 29 mmol/L Final    Glucose 10/26/2022 193 (H)  70 - 110 mg/dL Final    BUN 10/26/2022 21  8 - 23 mg/dL Final    Creatinine 10/26/2022 1.3  0.5 - 1.4 mg/dL Final    Calcium 10/26/2022 9.4  8.7 - 10.5 mg/dL Final    Total Protein 10/26/2022 6.7  6.0 - 8.4 g/dL Final    Albumin 10/26/2022 3.5  3.5 - 5.2 g/dL Final    Total Bilirubin 10/26/2022 0.3  0.1 - 1.0 mg/dL Final    Comment: For infants and newborns, interpretation of results should be based  on gestational age, weight and in agreement with clinical  observations.    Premature Infant recommended reference ranges:  Up to 24 hours.............<8.0 mg/dL  Up to 48 hours............<12.0 mg/dL  3-5 days..................<15.0 mg/dL  6-29 days.................<15.0 mg/dL      Alkaline Phosphatase 10/26/2022 87  55 - 135 U/L Final    AST 10/26/2022 27  10 - 40 U/L Final    ALT 10/26/2022 52 (H)  10 - 44 U/L Final    Anion Gap 10/26/2022 12  8 - 16 mmol/L Final    eGFR 10/26/2022 59 (A)  >60 mL/min/1.73 m^2 Final    D-Dimer 10/26/2022 0.40  <0.50 mg/L FEU Final    Comment: The quantitative D-dimer assay should be used as an aid in   the diagnosis of deep vein thrombosis and pulmonary embolism  in patients with the appropriate presentation and clinical  history. The upper limit of the reference interval and the clinical   cut off   point are identical. Causes of a positive (>0.50 mg/L FEU) D-Dimer   test  include, but are not limited to: DVT, PE, DIC, thrombolytic   therapy, anticoagulant therapy, recent surgery, trauma, or   pregnancy, disseminated malignancy, aortic aneurysm, cirrhosis,  and severe infection. False negative results may occur in   patients with distal DVT.  JOCELYN^612^^3^  LOT^610^DDiSup^[id number]\DDiBuf^[id number]\DDiReag^[id number]      aPTT 10/26/2022 22.6  21.0 - 32.0 sec Final    Comment: aPTT therapeutic range = 39-69 seconds  LOT^050^APTT [facility]^[id number]      Prothrombin Time  10/26/2022 10.8  9.0 - 12.5 sec Final    INR 10/26/2022 1.0  0.8 - 1.2 Final    Comment: Coumadin Therapy:  2.0 - 3.0 for INR for all indicators except mechanical heart valves  and antiphospholipid syndromes which should use 2.5 - 3.5.  LOT^040^PT [facility]^[id number]      Magnesium 10/26/2022 2.0  1.6 - 2.6 mg/dL Final    Troponin I 10/26/2022 0.035 (H)  0.000 - 0.026 ng/mL Final    Comment: The reference interval for Troponin I represents the 99th percentile   cutoff   for our facility and is consistent with 3rd generation assay   performance.      TSH 10/26/2022 4.602 (H)  0.400 - 4.000 uIU/mL Final    Free T4 10/26/2022 1.03  0.71 - 1.51 ng/dL Final    POC Rapid COVID 10/26/2022 Negative  Negative Final     Acceptable 10/26/2022 Yes   Final    POC Molecular Influenza A Ag 10/26/2022 Negative  Negative, Not Reported Final    POC Molecular Influenza B Ag 10/26/2022 Negative  Negative, Not Reported Final     Acceptable 10/26/2022 Yes   Final    Lactate (Lactic Acid) 10/26/2022 1.6  0.5 - 2.2 mmol/L Final    Comment: Falsely low lactic acid results can be found in samples   containing >=13.0 mg/dL total bilirubin and/or >=3.5 mg/dL   direct bilirubin.  Specimen slightly hemolyzed     Lab Visit on 10/21/2022   Component Date Value Ref Range Status    WBC 10/21/2022 10.23  3.90 - 12.70 K/uL Final    RBC 10/21/2022 5.49  4.60 - 6.20 M/uL Final    Hemoglobin 10/21/2022 18.6 (H)  14.0 - 18.0 g/dL Final    Hematocrit 10/21/2022 56.8 (H)  40.0 - 54.0 % Final    MCV 10/21/2022 104 (H)  82 - 98 fL Final    MCH 10/21/2022 33.9 (H)  27.0 - 31.0 pg Final    MCHC 10/21/2022 32.7  32.0 - 36.0 g/dL Final    RDW 10/21/2022 12.9  11.5 - 14.5 % Final    Platelets 10/21/2022 180  150 - 450 K/uL Final    MPV 10/21/2022 11.6  9.2 - 12.9 fL Final    Immature Granulocytes 10/21/2022 1.2 (H)  0.0 - 0.5 % Final    Gran # (ANC) 10/21/2022 8.1 (H)  1.8 - 7.7 K/uL Final    Immature Grans (Abs) 10/21/2022 0.12 (H)  0.00 -  0.04 K/uL Final    Comment: Mild elevation in immature granulocytes is non specific and   can be seen in a variety of conditions including stress response,   acute inflammation, trauma and pregnancy. Correlation with other   laboratory and clinical findings is essential.      Lymph # 10/21/2022 1.1  1.0 - 4.8 K/uL Final    Mono # 10/21/2022 0.8  0.3 - 1.0 K/uL Final    Eos # 10/21/2022 0.0  0.0 - 0.5 K/uL Final    Baso # 10/21/2022 0.03  0.00 - 0.20 K/uL Final    nRBC 10/21/2022 0  0 /100 WBC Final    Gran % 10/21/2022 79.3 (H)  38.0 - 73.0 % Final    Lymph % 10/21/2022 11.1 (L)  18.0 - 48.0 % Final    Mono % 10/21/2022 7.8  4.0 - 15.0 % Final    Eosinophil % 10/21/2022 0.3  0.0 - 8.0 % Final    Basophil % 10/21/2022 0.3  0.0 - 1.9 % Final    Differential Method 10/21/2022 Automated   Final    CRP 10/21/2022 8.8 (H)  0.0 - 8.2 mg/L Final    Sodium 10/21/2022 139  136 - 145 mmol/L Final    Potassium 10/21/2022 5.4 (H)  3.5 - 5.1 mmol/L Final    Chloride 10/21/2022 97  95 - 110 mmol/L Final    CO2 10/21/2022 28  23 - 29 mmol/L Final    Glucose 10/21/2022 190 (H)  70 - 110 mg/dL Final    BUN 10/21/2022 27 (H)  8 - 23 mg/dL Final    Creatinine 10/21/2022 1.3  0.5 - 1.4 mg/dL Final    Calcium 10/21/2022 9.6  8.7 - 10.5 mg/dL Final    Total Protein 10/21/2022 7.2  6.0 - 8.4 g/dL Final    Albumin 10/21/2022 3.9  3.5 - 5.2 g/dL Final    Total Bilirubin 10/21/2022 0.6  0.1 - 1.0 mg/dL Final    Comment: For infants and newborns, interpretation of results should be based  on gestational age, weight and in agreement with clinical  observations.    Premature Infant recommended reference ranges:  Up to 24 hours.............<8.0 mg/dL  Up to 48 hours............<12.0 mg/dL  3-5 days..................<15.0 mg/dL  6-29 days.................<15.0 mg/dL      Alkaline Phosphatase 10/21/2022 99  55 - 135 U/L Final    AST 10/21/2022 29  10 - 40 U/L Final    ALT 10/21/2022 49 (H)  10 - 44 U/L Final    Anion Gap 10/21/2022 14  8 - 16  mmol/L Final    eGFR 10/21/2022 59 (A)  >60 mL/min/1.73 m^2 Final    Sed Rate 10/21/2022 3  0 - 10 mm/Hr Final    Vit D, 25-Hydroxy 10/21/2022 20 (L)  30 - 96 ng/mL Final    Comment: Vitamin D deficiency.........<10 ng/mL                              Vitamin D insufficiency......10-29 ng/mL       Vitamin D sufficiency........> or equal to 30 ng/mL  Vitamin D toxicity............>100 ng/mL      Creatinine 10/21/2022 1.3  0.5 - 1.4 mg/dL Final    eGFR 10/21/2022 59 (A)  >60 mL/min/1.73 m^2 Final    Sodium 10/21/2022 139  136 - 145 mmol/L Final    Potassium 10/21/2022 5.4 (H)  3.5 - 5.1 mmol/L Final    Chloride 10/21/2022 97  95 - 110 mmol/L Final    CO2 10/21/2022 28  23 - 29 mmol/L Final    Glucose 10/21/2022 190 (H)  70 - 110 mg/dL Final    BUN 10/21/2022 27 (H)  8 - 23 mg/dL Final    Creatinine 10/21/2022 1.3  0.5 - 1.4 mg/dL Final    Calcium 10/21/2022 9.6  8.7 - 10.5 mg/dL Final    Total Protein 10/21/2022 7.2  6.0 - 8.4 g/dL Final    Albumin 10/21/2022 3.9  3.5 - 5.2 g/dL Final    Total Bilirubin 10/21/2022 0.6  0.1 - 1.0 mg/dL Final    Comment: For infants and newborns, interpretation of results should be based  on gestational age, weight and in agreement with clinical  observations.    Premature Infant recommended reference ranges:  Up to 24 hours.............<8.0 mg/dL  Up to 48 hours............<12.0 mg/dL  3-5 days..................<15.0 mg/dL  6-29 days.................<15.0 mg/dL      Alkaline Phosphatase 10/21/2022 99  55 - 135 U/L Final    AST 10/21/2022 29  10 - 40 U/L Final    ALT 10/21/2022 49 (H)  10 - 44 U/L Final    Anion Gap 10/21/2022 14  8 - 16 mmol/L Final    eGFR 10/21/2022 59 (A)  >60 mL/min/1.73 m^2 Final    WBC 10/21/2022 10.23  3.90 - 12.70 K/uL Final    RBC 10/21/2022 5.49  4.60 - 6.20 M/uL Final    Hemoglobin 10/21/2022 18.6 (H)  14.0 - 18.0 g/dL Final    Hematocrit 10/21/2022 56.8 (H)  40.0 - 54.0 % Final    MCV 10/21/2022 104 (H)  82 - 98 fL Final    MCH 10/21/2022 33.9 (H)  27.0 -  31.0 pg Final    MCHC 10/21/2022 32.7  32.0 - 36.0 g/dL Final    RDW 10/21/2022 12.9  11.5 - 14.5 % Final    Platelets 10/21/2022 180  150 - 450 K/uL Final    MPV 10/21/2022 11.6  9.2 - 12.9 fL Final    Immature Granulocytes 10/21/2022 1.2 (H)  0.0 - 0.5 % Final    Gran # (ANC) 10/21/2022 8.1 (H)  1.8 - 7.7 K/uL Final    Immature Grans (Abs) 10/21/2022 0.12 (H)  0.00 - 0.04 K/uL Final    Comment: Mild elevation in immature granulocytes is non specific and   can be seen in a variety of conditions including stress response,   acute inflammation, trauma and pregnancy. Correlation with other   laboratory and clinical findings is essential.      Lymph # 10/21/2022 1.1  1.0 - 4.8 K/uL Final    Mono # 10/21/2022 0.8  0.3 - 1.0 K/uL Final    Eos # 10/21/2022 0.0  0.0 - 0.5 K/uL Final    Baso # 10/21/2022 0.03  0.00 - 0.20 K/uL Final    nRBC 10/21/2022 0  0 /100 WBC Final    Gran % 10/21/2022 79.3 (H)  38.0 - 73.0 % Final    Lymph % 10/21/2022 11.1 (L)  18.0 - 48.0 % Final    Mono % 10/21/2022 7.8  4.0 - 15.0 % Final    Eosinophil % 10/21/2022 0.3  0.0 - 8.0 % Final    Basophil % 10/21/2022 0.3  0.0 - 1.9 % Final    Differential Method 10/21/2022 Automated   Final    CRP 10/21/2022 8.8 (H)  0.0 - 8.2 mg/L Final    Sed Rate 10/21/2022 3  0 - 10 mm/Hr Final    Specimen UA 10/21/2022 Urine, Clean Catch   Final    Color, UA 10/21/2022 Yellow  Yellow, Straw, Trice Final    Appearance, UA 10/21/2022 Clear  Clear Final    pH, UA 10/21/2022 6.0  5.0 - 8.0 Final    Specific Gravity, UA 10/21/2022 1.025  1.005 - 1.030 Final    Protein, UA 10/21/2022 Negative  Negative Final    Comment: Recommend a 24 hour urine protein or a urine   protein/creatinine ratio if globulin induced proteinuria is  clinically suspected.      Glucose, UA 10/21/2022 Negative  Negative Final    Ketones, UA 10/21/2022 Trace (A)  Negative Final    Bilirubin (UA) 10/21/2022 Negative  Negative Final    Occult Blood UA 10/21/2022 Negative  Negative Final    Nitrite,  UA 10/21/2022 Negative  Negative Final    Urobilinogen, UA 10/21/2022 Negative  <2.0 EU/dL Final    Leukocytes, UA 10/21/2022 Negative  Negative Final   Admission on 10/20/2022, Discharged on 10/20/2022   Component Date Value Ref Range Status    Troponin I 10/20/2022 0.013  0.000 - 0.026 ng/mL Final    Comment: The reference interval for Troponin I represents the 99th percentile   cutoff   for our facility and is consistent with 3rd generation assay   performance.      Lactate (Lactic Acid) 10/20/2022 1.9  0.5 - 2.2 mmol/L Final    Comment: Falsely low lactic acid results can be found in samples   containing >=13.0 mg/dL total bilirubin and/or >=3.5 mg/dL   direct bilirubin.      WBC 10/20/2022 11.21  3.90 - 12.70 K/uL Final    RBC 10/20/2022 5.55  4.60 - 6.20 M/uL Final    Hemoglobin 10/20/2022 18.9 (H)  14.0 - 18.0 g/dL Final    Hematocrit 10/20/2022 54.3 (H)  40.0 - 54.0 % Final    MCV 10/20/2022 98  82 - 98 fL Final    MCH 10/20/2022 34.1 (H)  27.0 - 31.0 pg Final    MCHC 10/20/2022 34.8  32.0 - 36.0 g/dL Final    RDW 10/20/2022 12.8  11.5 - 14.5 % Final    Platelets 10/20/2022 116 (L)  150 - 450 K/uL Final    MPV 10/20/2022 12.1  9.2 - 12.9 fL Final    Immature Granulocytes 10/20/2022 Test Not Performed  0.0 - 0.5 % Corrected    CORRECTED RESULT; previously reported as 0.9 on 10/20/2022 at 03:07.    Gran # (ANC) 10/20/2022 Test Not Performed  1.8 - 7.7 K/uL Corrected    CORRECTED RESULT; previously reported as 7.7 on 10/20/2022 at 03:07.    Immature Grans (Abs) 10/20/2022 Test Not Performed  0.00 - 0.04 K/uL Corrected    Comment: Mild elevation in immature granulocytes is non specific and   can be seen in a variety of conditions including stress response,   acute inflammation, trauma and pregnancy. Correlation with other   laboratory and clinical findings is essential.  CORRECTED RESULT; previously reported as 0.10 on 10/20/2022 at 03:07.      Lymph # 10/20/2022 Test Not Performed  1.0 - 4.8 K/uL Corrected     CORRECTED RESULT; previously reported as 2.1 on 10/20/2022 at 03:07.    Mono # 10/20/2022 Test Not Performed  0.3 - 1.0 K/uL Corrected    CORRECTED RESULT; previously reported as 1.1 on 10/20/2022 at 03:07.    Eos # 10/20/2022 Test Not Performed  0.0 - 0.5 K/uL Corrected    CORRECTED RESULT; previously reported as 0.1 on 10/20/2022 at 03:07.    Baso # 10/20/2022 Test Not Performed  0.00 - 0.20 K/uL Corrected    CORRECTED RESULT; previously reported as 0.06 on 10/20/2022 at 03:07.    nRBC 10/20/2022 0  0 /100 WBC Final    Gran % 10/20/2022 66.0  38.0 - 73.0 % Corrected    CORRECTED RESULT; previously reported as 69.0 on 10/20/2022 at 03:07.    Lymph % 10/20/2022 23.0  18.0 - 48.0 % Corrected    CORRECTED RESULT; previously reported as 18.7 on 10/20/2022 at 03:07.    Mono % 10/20/2022 8.0  4.0 - 15.0 % Corrected    CORRECTED RESULT; previously reported as 10.1 on 10/20/2022 at 03:07.    Eosinophil % 10/20/2022 0.0  0.0 - 8.0 % Corrected    CORRECTED RESULT; previously reported as 0.8 on 10/20/2022 at 03:07.    Basophil % 10/20/2022 0.0  0.0 - 1.9 % Corrected    CORRECTED RESULT; previously reported as 0.5 on 10/20/2022 at 03:07.    Bands 10/20/2022 2.0  % Final    Metamyelocytes 10/20/2022 1.0  % Final    Platelet Estimate 10/20/2022 Decreased (A)   Final    Poik 10/20/2022 Slight   Final    Ovalocytes 10/20/2022 Occasional   Final    Tear Drop Cells 10/20/2022 Occasional   Final    Spherocytes 10/20/2022 Occasional   Final    Dohle Bodies 10/20/2022 Present   Final    Toxic Granulation 10/20/2022 Present   Final    Smudge Cells 10/20/2022 Present   Final    Comment: Smudge cells present;Substantial numbers may affect the   accuracy of the differential.      Large/Giant Platelets 10/20/2022 Present   Final    Differential Method 10/20/2022 Manual   Corrected    Comment: CORRECTED RESULT; previously reported as Automated on 10/20/2022 at   03:07.      POC Glucose 10/20/2022 126 (H)  70 - 110 mg/dL Final    POC BUN  10/20/2022 27  6 - 30 mg/dL Final    POC Creatinine 10/20/2022 1.1  0.5 - 1.4 mg/dL Final    POC Sodium 10/20/2022 138  136 - 145 mmol/L Final    POC Potassium 10/20/2022 5.1  3.5 - 5.1 mmol/L Final    POC Chloride 10/20/2022 101  95 - 110 mmol/L Final    POC TCO2 (MEASURED) 10/20/2022 32 (H)  23 - 29 mmol/L Final    POC Ionized Calcium 10/20/2022 1.01 (L)  1.06 - 1.42 mmol/L Final    POC Hematocrit 10/20/2022 56 (H)  36 - 54 %PCV Final    Sample 10/20/2022 CAROL   Final    Influenza A, Molecular 10/20/2022 Negative  Negative Final    Influenza B, Molecular 10/20/2022 Negative  Negative Final    Flu A & B Source 10/20/2022 Nasal swab   Final    SARS-CoV-2 RNA, Amplification, Qual 10/20/2022 Negative  Negative Final    Comment: This test utilizes isothermal nucleic acid amplification technology   to   detect the SARS-CoV-2 RdRp nucleic acid segment. The analytical   sensitivity   (limit of detection) is 500 copies/swab.     A POSITIVE result is indicative of the presence of SARS-CoV-2 RNA;   clinical   correlation with patient history and other diagnostic information is   necessary to determine patient infection status.    A NEGATIVE result means that SARS-CoV-2 nucleic acids are not present   above   the limit of detection. A NEGATIVE result should be treated as   presumptive.   It does not rule out the possibility of COVID-19 and should not be   the sole   basis for treatment decisions. If COVID-19 is strongly suspected   based on   clinical and exposure history, re-testing using an alternate   molecular assay   should be considered.     This test is only for use under the Food and Drug Administration s   Emergency   Use Authorization (EUA).     Commercial kits are provided by [employer]. Performanc                           e   characteristics of the EUA have been independently verified by   [facility] Department of Pathology and Laboratory Medicine.    _________________________________________________________________   The authorized Fact Sheet for Healthcare Providers and the authorized   Fact   Sheet for Patients of the ID NOW COVID-19 are available on the FDA   website:   https://www.fda.gov/media/517818/download   https://www.fda.gov/media/447594/download      Sodium 10/20/2022 137  136 - 145 mmol/L Final    Potassium 10/20/2022 4.5  3.5 - 5.1 mmol/L Final    Chloride 10/20/2022 97  95 - 110 mmol/L Final    CO2 10/20/2022 29  23 - 29 mmol/L Final    Glucose 10/20/2022 126 (H)  70 - 110 mg/dL Final    BUN 10/20/2022 21  8 - 23 mg/dL Final    Creatinine 10/20/2022 1.3  0.5 - 1.4 mg/dL Final    Calcium 10/20/2022 9.6  8.7 - 10.5 mg/dL Final    Total Protein 10/20/2022 7.5  6.0 - 8.4 g/dL Final    Albumin 10/20/2022 4.1  3.5 - 5.2 g/dL Final    Total Bilirubin 10/20/2022 0.9  0.1 - 1.0 mg/dL Final    Comment: For infants and newborns, interpretation of results should be based  on gestational age, weight and in agreement with clinical  observations.    Premature Infant recommended reference ranges:  Up to 24 hours.............<8.0 mg/dL  Up to 48 hours............<12.0 mg/dL  3-5 days..................<15.0 mg/dL  6-29 days.................<15.0 mg/dL      Alkaline Phosphatase 10/20/2022 113  55 - 135 U/L Final    AST 10/20/2022 28  10 - 40 U/L Final    ALT 10/20/2022 47 (H)  10 - 44 U/L Final    Anion Gap 10/20/2022 11  8 - 16 mmol/L Final    eGFR 10/20/2022 59.5 (A)  >60 mL/min/1.73 m^2 Final    Lipase 10/20/2022 6  4 - 60 U/L Final    Troponin I 10/20/2022 0.016  0.000 - 0.026 ng/mL Final    Comment: The reference interval for Troponin I represents the 99th percentile   cutoff   for our facility and is consistent with 3rd generation assay   performance.     Lab Visit on 08/16/2022   Component Date Value Ref Range Status    WBC 08/16/2022 9.53  3.90 - 12.70 K/uL Final    RBC 08/16/2022 5.45  4.60 - 6.20 M/uL Final    Hemoglobin 08/16/2022 18.8 (H)  14.0 - 18.0  g/dL Final    Hematocrit 08/16/2022 55.3 (H)  40.0 - 54.0 % Final    MCV 08/16/2022 102 (H)  82 - 98 fL Final    MCH 08/16/2022 34.5 (H)  27.0 - 31.0 pg Final    MCHC 08/16/2022 34.0  32.0 - 36.0 g/dL Final    RDW 08/16/2022 12.4  11.5 - 14.5 % Final    Platelets 08/16/2022 173  150 - 450 K/uL Final    MPV 08/16/2022 11.9  9.2 - 12.9 fL Final    Immature Granulocytes 08/16/2022 0.6 (H)  0.0 - 0.5 % Final    Gran # (ANC) 08/16/2022 8.0 (H)  1.8 - 7.7 K/uL Final    Immature Grans (Abs) 08/16/2022 0.06 (H)  0.00 - 0.04 K/uL Final    Comment: Mild elevation in immature granulocytes is non specific and   can be seen in a variety of conditions including stress response,   acute inflammation, trauma and pregnancy. Correlation with other   laboratory and clinical findings is essential.      Lymph # 08/16/2022 1.1  1.0 - 4.8 K/uL Final    Mono # 08/16/2022 0.3  0.3 - 1.0 K/uL Final    Eos # 08/16/2022 0.1  0.0 - 0.5 K/uL Final    Baso # 08/16/2022 0.05  0.00 - 0.20 K/uL Final    nRBC 08/16/2022 0  0 /100 WBC Final    Gran % 08/16/2022 83.8 (H)  38.0 - 73.0 % Final    Lymph % 08/16/2022 11.1 (L)  18.0 - 48.0 % Final    Mono % 08/16/2022 3.5 (L)  4.0 - 15.0 % Final    Eosinophil % 08/16/2022 0.5  0.0 - 8.0 % Final    Basophil % 08/16/2022 0.5  0.0 - 1.9 % Final    Differential Method 08/16/2022 Automated   Final    Prothrombin Time 08/16/2022 10.7  9.0 - 12.5 sec Final    INR 08/16/2022 1.0  0.8 - 1.2 Final    Comment: Coumadin Therapy:  2.0 - 3.0 for INR for all indicators except mechanical heart valves  and antiphospholipid syndromes which should use 2.5 - 3.5.  LOT^040^PT Inn^[id number]     Lab Visit on 07/21/2022   Component Date Value Ref Range Status    Sodium 07/21/2022 140  136 - 145 mmol/L Final    Potassium 07/21/2022 4.3  3.5 - 5.1 mmol/L Final    Chloride 07/21/2022 103  95 - 110 mmol/L Final    CO2 07/21/2022 25  23 - 29 mmol/L Final    Glucose 07/21/2022 126 (H)  70 - 110 mg/dL Final    BUN 07/21/2022 13  8 - 23  mg/dL Final    Creatinine 07/21/2022 1.1  0.5 - 1.4 mg/dL Final    Calcium 07/21/2022 9.4  8.7 - 10.5 mg/dL Final    Total Protein 07/21/2022 7.0  6.0 - 8.4 g/dL Final    Albumin 07/21/2022 3.8  3.5 - 5.2 g/dL Final    Total Bilirubin 07/21/2022 0.6  0.1 - 1.0 mg/dL Final    Comment: For infants and newborns, interpretation of results should be based  on gestational age, weight and in agreement with clinical  observations.    Premature Infant recommended reference ranges:  Up to 24 hours.............<8.0 mg/dL  Up to 48 hours............<12.0 mg/dL  3-5 days..................<15.0 mg/dL  6-29 days.................<15.0 mg/dL      Alkaline Phosphatase 07/21/2022 61  55 - 135 U/L Final    AST 07/21/2022 21  10 - 40 U/L Final    ALT 07/21/2022 31  10 - 44 U/L Final    Anion Gap 07/21/2022 12  8 - 16 mmol/L Final    eGFR if African American 07/21/2022 >60  >60 mL/min/1.73 m^2 Final    eGFR if non African American 07/21/2022 >60  >60 mL/min/1.73 m^2 Final    Comment: Calculation used to obtain the estimated glomerular filtration  rate (eGFR) is the CKD-EPI equation.       WBC 07/21/2022 7.04  3.90 - 12.70 K/uL Final    RBC 07/21/2022 5.37  4.60 - 6.20 M/uL Final    Hemoglobin 07/21/2022 18.1 (H)  14.0 - 18.0 g/dL Final    Hematocrit 07/21/2022 54.3 (H)  40.0 - 54.0 % Final    MCV 07/21/2022 101 (H)  82 - 98 fL Final    MCH 07/21/2022 33.7 (H)  27.0 - 31.0 pg Final    MCHC 07/21/2022 33.3  32.0 - 36.0 g/dL Final    RDW 07/21/2022 12.7  11.5 - 14.5 % Final    Platelets 07/21/2022 139 (L)  150 - 450 K/uL Final    MPV 07/21/2022 11.9  9.2 - 12.9 fL Final    Immature Granulocytes 07/21/2022 0.4  0.0 - 0.5 % Final    Gran # (ANC) 07/21/2022 3.7  1.8 - 7.7 K/uL Final    Immature Grans (Abs) 07/21/2022 0.03  0.00 - 0.04 K/uL Final    Comment: Mild elevation in immature granulocytes is non specific and   can be seen in a variety of conditions including stress response,   acute inflammation, trauma and pregnancy. Correlation with  other   laboratory and clinical findings is essential.      Lymph # 07/21/2022 2.3  1.0 - 4.8 K/uL Final    Mono # 07/21/2022 0.7  0.3 - 1.0 K/uL Final    Eos # 07/21/2022 0.2  0.0 - 0.5 K/uL Final    Baso # 07/21/2022 0.04  0.00 - 0.20 K/uL Final    nRBC 07/21/2022 0  0 /100 WBC Final    Gran % 07/21/2022 53.1  38.0 - 73.0 % Final    Lymph % 07/21/2022 33.2  18.0 - 48.0 % Final    Mono % 07/21/2022 9.7  4.0 - 15.0 % Final    Eosinophil % 07/21/2022 3.0  0.0 - 8.0 % Final    Basophil % 07/21/2022 0.6  0.0 - 1.9 % Final    Differential Method 07/21/2022 Automated   Final    CRP 07/21/2022 0.4  0.0 - 8.2 mg/L Final    Sed Rate 07/21/2022 2  0 - 10 mm/Hr Final       Imaging  X-Ray Abdomen AP 1 View    Result Date: 12/23/2022  EXAMINATION: XR ABDOMEN AP 1 VIEW CLINICAL HISTORY: persistent nausea r/o ileus; FINDINGS: Abdomen one view: Lungs demonstrate mild interstitial prominence.  No obstruction, ileus, or perforation seen.  No acute process seen. Electronically signed by: Jace Ching MD Date:    12/23/2022 Time:    07:50    X-Ray Chest AP Portable    Result Date: 12/25/2022  EXAMINATION: XR CHEST AP PORTABLE CLINICAL HISTORY: hypoxia; TECHNIQUE: Single frontal view of the chest was performed. COMPARISON: 11/23/2022 FINDINGS: There is improved bilateral, right greater than left, airspace disease when compared to 11/23/2022.  Persistent consolidation in the right mid lung zone.  No large effusion. Heart size is unchanged.     As above Electronically signed by: Melani Ballesteros Date:    12/25/2022 Time:    08:21    US Upper Extremity Veins Right    Result Date: 12/29/2022  EXAMINATION: US UPPER EXTREMITY VEINS RIGHT CLINICAL HISTORY: r/o DVT, pain; TECHNIQUE: Duplex and color flow Doppler evaluation and dynamic compression was performed of the right upper extremity veins. COMPARISON: None FINDINGS: Central veins: The internal jugular, subclavian, and axillary veins are patent and free of thrombus. Arm veins: Thrombosis  of the right basilic vein noting catheter in place.  The brachial and cephalic veins are patent and compressible. Contralateral subclavian/internal jugular veins: The left subclavian and internal jugular veins are patent and free of thrombus. Other findings: None.     Superficial thrombophlebitis of the right basilic vein noting catheter in place.  No DVT. This report was flagged in Epic as abnormal. Electronically signed by resident: Emerson Card Date:    12/29/2022 Time:    20:37 Electronically signed by: Mohinder Tian MD Date:    12/29/2022 Time:    20:42      Assessment  1. Atherosclerosis of native coronary artery of native heart without angina pectoris  Stable and free of angina    2. Chronic diastolic heart failure  Mildly decompensated, just started Lasix today.    3. Atherosclerotic peripheral vascular disease  Asymptomatic    4. Severe obesity  Has lost 35 lb    5. Primary hypertension  Reports some symptomatic hypotension    6. Chronic obstructive pulmonary disease, unspecified COPD type  Off prednisone without wheezing    7. Type 2 diabetes mellitus with other circulatory complication, with long-term current use of insulin  Has been running high    8. Supraventricular tachycardia  Controlled    Plan and Discussion    Decrease metoprolol to 25 mg twice daily for 1 month, then discontinue.  Continue other guideline directed medical therapy.    The 10-year ASCVD risk score (Rosalie DK, et al., 2019) is: 26.4%    Values used to calculate the score:      Age: 67 years      Sex: Male      Is Non- : No      Diabetic: Yes      Tobacco smoker: Yes      Systolic Blood Pressure: 102 mmHg      Is BP treated: Yes      HDL Cholesterol: 58 mg/dL      Total Cholesterol: 178 mg/dL     Follow Up  Follow up in about 6 weeks (around 3/1/2023).      Shemar Dempsey MD

## 2023-01-19 ENCOUNTER — OFFICE VISIT (OUTPATIENT)
Dept: URGENT CARE | Facility: CLINIC | Age: 68
End: 2023-01-19
Payer: MEDICARE

## 2023-01-19 VITALS
RESPIRATION RATE: 16 BRPM | SYSTOLIC BLOOD PRESSURE: 127 MMHG | WEIGHT: 208.56 LBS | TEMPERATURE: 99 F | BODY MASS INDEX: 29.2 KG/M2 | HEIGHT: 71 IN | HEART RATE: 88 BPM | OXYGEN SATURATION: 96 % | DIASTOLIC BLOOD PRESSURE: 73 MMHG

## 2023-01-19 VITALS
TEMPERATURE: 99 F | OXYGEN SATURATION: 98 % | HEART RATE: 83 BPM | WEIGHT: 204 LBS | SYSTOLIC BLOOD PRESSURE: 152 MMHG | HEIGHT: 71 IN | RESPIRATION RATE: 18 BRPM | DIASTOLIC BLOOD PRESSURE: 78 MMHG | BODY MASS INDEX: 28.56 KG/M2

## 2023-01-19 DIAGNOSIS — M79.644 PAIN OF FINGER OF RIGHT HAND: ICD-10-CM

## 2023-01-19 DIAGNOSIS — M25.512 ACUTE PAIN OF LEFT SHOULDER: ICD-10-CM
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
## 2023-01-19 DIAGNOSIS — T07.XXXA ABRASIONS OF MULTIPLE SITES: ICD-10-CM

## 2023-01-19 DIAGNOSIS — M25.511 ACUTE PAIN OF RIGHT SHOULDER: ICD-10-CM

## 2023-01-19 LAB
POCT GLUCOSE: 150 MG/DL (ref 70–110)
POCT GLUCOSE: 283 MG/DL (ref 70–110)

## 2023-01-19 PROCEDURE — 1125F AMNT PAIN NOTED PAIN PRSNT: CPT | Mod: CPTII,S$GLB,, | Performed by: PHYSICIAN ASSISTANT

## 2023-01-19 PROCEDURE — 3077F SYST BP >= 140 MM HG: CPT | Mod: CPTII,S$GLB,, | Performed by: PHYSICIAN ASSISTANT

## 2023-01-19 PROCEDURE — 97535 SELF CARE MNGMENT TRAINING: CPT | Mod: CO

## 2023-01-19 PROCEDURE — 97530 THERAPEUTIC ACTIVITIES: CPT | Mod: CQ

## 2023-01-19 PROCEDURE — 25000003 PHARM REV CODE 250: Performed by: NURSE PRACTITIONER

## 2023-01-19 PROCEDURE — 3077F PR MOST RECENT SYSTOLIC BLOOD PRESSURE >= 140 MM HG: ICD-10-PCS | Mod: CPTII,S$GLB,, | Performed by: PHYSICIAN ASSISTANT

## 2023-01-19 PROCEDURE — 3008F BODY MASS INDEX DOCD: CPT | Mod: CPTII,S$GLB,, | Performed by: PHYSICIAN ASSISTANT

## 2023-01-19 PROCEDURE — 63600175 PHARM REV CODE 636 W HCPCS: Performed by: STUDENT IN AN ORGANIZED HEALTH CARE EDUCATION/TRAINING PROGRAM

## 2023-01-19 PROCEDURE — 1125F PR PAIN SEVERITY QUANTIFIED, PAIN PRESENT: ICD-10-PCS | Mod: CPTII,S$GLB,, | Performed by: PHYSICIAN ASSISTANT

## 2023-01-19 PROCEDURE — 3008F PR BODY MASS INDEX (BMI) DOCUMENTED: ICD-10-PCS | Mod: CPTII,S$GLB,, | Performed by: PHYSICIAN ASSISTANT

## 2023-01-19 PROCEDURE — 1159F MED LIST DOCD IN RCRD: CPT | Mod: CPTII,S$GLB,, | Performed by: PHYSICIAN ASSISTANT

## 2023-01-19 PROCEDURE — 99214 OFFICE O/P EST MOD 30 MIN: CPT | Mod: S$GLB,,, | Performed by: PHYSICIAN ASSISTANT

## 2023-01-19 PROCEDURE — 99214 PR OFFICE/OUTPT VISIT, EST, LEVL IV, 30-39 MIN: ICD-10-PCS | Mod: S$GLB,,, | Performed by: PHYSICIAN ASSISTANT

## 2023-01-19 PROCEDURE — 3078F DIAST BP <80 MM HG: CPT | Mod: CPTII,S$GLB,, | Performed by: PHYSICIAN ASSISTANT

## 2023-01-19 PROCEDURE — 25000003 PHARM REV CODE 250: Performed by: STUDENT IN AN ORGANIZED HEALTH CARE EDUCATION/TRAINING PROGRAM

## 2023-01-19 PROCEDURE — 3078F PR MOST RECENT DIASTOLIC BLOOD PRESSURE < 80 MM HG: ICD-10-PCS | Mod: CPTII,S$GLB,, | Performed by: PHYSICIAN ASSISTANT

## 2023-01-19 PROCEDURE — 1160F RVW MEDS BY RX/DR IN RCRD: CPT | Mod: CPTII,S$GLB,, | Performed by: PHYSICIAN ASSISTANT

## 2023-01-19 PROCEDURE — 1159F PR MEDICATION LIST DOCUMENTED IN MEDICAL RECORD: ICD-10-PCS | Mod: CPTII,S$GLB,, | Performed by: PHYSICIAN ASSISTANT

## 2023-01-19 PROCEDURE — 1160F PR REVIEW ALL MEDS BY PRESCRIBER/CLIN PHARMACIST DOCUMENTED: ICD-10-PCS | Mod: CPTII,S$GLB,, | Performed by: PHYSICIAN ASSISTANT

## 2023-01-19 RX ORDER — CEPHALEXIN 500 MG/1
500 CAPSULE ORAL EVERY 8 HOURS
Qty: 21 CAPSULE | Refills: 0 | Status: SHIPPED | OUTPATIENT
Start: 2023-01-19 | End: 2023-01-25 | Stop reason: SDUPTHER

## 2023-01-19 RX ADMIN — ZINC SULFATE 220 MG (50 MG) CAPSULE 220 MG: CAPSULE at 10:01

## 2023-01-19 RX ADMIN — FUROSEMIDE 40 MG: 40 TABLET ORAL at 10:01

## 2023-01-19 RX ADMIN — OXYCODONE HYDROCHLORIDE 20 MG: 10 TABLET ORAL at 02:01

## 2023-01-19 RX ADMIN — EZETIMIBE 10 MG: 10 TABLET ORAL at 10:01

## 2023-01-19 RX ADMIN — DILTIAZEM HYDROCHLORIDE 120 MG: 120 CAPSULE, COATED, EXTENDED RELEASE ORAL at 10:01

## 2023-01-19 RX ADMIN — OXYCODONE HYDROCHLORIDE 20 MG: 10 TABLET ORAL at 06:01

## 2023-01-19 RX ADMIN — INSULIN ASPART 14 UNITS: 100 INJECTION, SOLUTION INTRAVENOUS; SUBCUTANEOUS at 10:01

## 2023-01-19 RX ADMIN — DICLOFENAC 4 G: 10 GEL TOPICAL at 10:01

## 2023-01-19 RX ADMIN — FLUTICASONE PROPIONATE 100 MCG: 50 SPRAY, METERED NASAL at 10:01

## 2023-01-19 RX ADMIN — FAMOTIDINE 20 MG: 20 TABLET ORAL at 10:01

## 2023-01-19 RX ADMIN — OXYCODONE HYDROCHLORIDE 20 MG: 10 TABLET ORAL at 10:01

## 2023-01-19 RX ADMIN — CETIRIZINE HYDROCHLORIDE 10 MG: 5 TABLET, FILM COATED ORAL at 10:01

## 2023-01-19 RX ADMIN — POTASSIUM CHLORIDE 20 MEQ: 1500 TABLET, EXTENDED RELEASE ORAL at 10:01

## 2023-01-19 RX ADMIN — INSULIN DETEMIR 44 UNITS: 100 INJECTION, SOLUTION SUBCUTANEOUS at 10:01

## 2023-01-19 RX ADMIN — FOLIC ACID 1 MG: 1 TABLET ORAL at 10:01

## 2023-01-19 RX ADMIN — OXYCODONE HYDROCHLORIDE AND ACETAMINOPHEN 500 MG: 500 TABLET ORAL at 10:01

## 2023-01-19 RX ADMIN — THERA TABS 1 TABLET: TAB at 10:01

## 2023-01-19 RX ADMIN — METOPROLOL TARTRATE 50 MG: 50 TABLET, FILM COATED ORAL at 10:01

## 2023-01-19 RX ADMIN — PREDNISONE 10 MG: 10 TABLET ORAL at 10:01

## 2023-01-19 RX ADMIN — DIGOXIN 125 MCG: 125 TABLET ORAL at 10:01

## 2023-01-19 RX ADMIN — ASPIRIN 81 MG 81 MG: 81 TABLET ORAL at 10:01

## 2023-01-19 NOTE — DISCHARGE SUMMARY
[facility] Medicine  Discharge Summary      Patient Name: Nithin Wagner  MRN: [id number]  ALLI: [id number]  Patient Class: IP- SNF  Admission Date: 12/31/2022  Hospital Length of Stay: 19 days  Discharge Date and Time: 1/19/2023 12:21 PM  Attending Physician: No att. providers found   Discharging Provider: Wandy Veliz NP  Primary Care Provider: Tushar Drew MD    Primary Care Team: LTAC 8 WANDY VELIZ     HPI:   Nithin Wagner is a 67 y.o. male with PMH of DM2, CAD, HTN, and HLD who presents to [facility] for treatment of sepsis and debility. Patient presented to urgent care with complaints of scrotal pain, redness and fever. He was advised to present to ED. Patient presented to the ED the next day. In ED he reported, fevers and chills at home, nausea, decreased PO intake, and multiple syncopal episodes with +incontinence of stool. 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  Empiric initiated with cefepime, clindamycin, metronidazole, and vancomycin.  Urology consulted and he underwent emergent excision and debridement of necrotic tissue on 11/15. Repeat washout performed 11/17. Improved and stepped down from ICU 11/18.  Bedside debridement performed on 11/22.  ID consulted and antibiotics regimen changed to cefepime and Flagyl known.  Wound cultures with Anaerococcus and Prevotella species.  Renal function normalized.  Return of SVT and Cardiology consulted with start of metoprolol. Patient deemed medically stable. Transferred to [facility] on 12/1/22. He completed course of cefepime and flagyl for treatment of kami's gangrene. Discharged to OS on 12/31/22.  Today, he has multiple concerns. He would like to make sure he has follow ups scheduled with cardiology, infectious disease, and rheumatology. Also reporting occasional dizziness. He refused his lasix this morning. Reports poor po intake and loss of 50 pounds over the past few months. Will hold lasix for  now. He also is requesting to stop his prednisone, will discontinue. Reports having some sinus congestion and pressure in his ears. Currently on course of zyrtec.        Hospital Course:   Patient progressed well with PT and OT- last PT note states that patient ambulated with RW mod I 250f. Patient had no significant events during their stay at SNF.  Patient's groin wound continued to heal while at SNF.  At time of discharge, patient was performing his own dressing changes.  Patient is sitting himself up a follow-up appointment with his surgeon.  Patient's pain is much better controlled, he is no longer taking long-acting oxycodone.  Home health was set up. DME was ordered if needed. Follow up appointment to be made by patient within one week. All prescriptions and discharge instructions were ordered to be given to the patient prior to discharge.      PEx  Constitutional: Patient appears debilitated.  No distress noted  HENT:   Head: Normocephalic and atraumatic.   Eyes: Pupils are equal, round  Neck: Normal range of motion. Neck supple.   Cardiovascular: Normal rate, regular rhythm and normal heart sounds.    Pulmonary/Chest: Effort normal and breath sounds are clear  Abdominal: Soft. Bowel sounds are normal.   Musculoskeletal: Normal range of motion.   Neurological: Alert and oriented to person, place, and time.   Psychiatric: Normal mood and affect. Behavior is normal.   Skin: Skin is warm and dry.  Scrotal wound          Incision/Site 11/17/22 1639 Left Scrotum lateral vertical     Side: Left  Location: Scrotum  Orientation: lateral  Incision Type: vertical   Incision WDL ex   Dressing Appearance Intact;Clean;Moist drainage   Drainage Amount Scant   Drainage Characteristics/Odor Tan;Creamy   Appearance Pink;Moist;Epithelialization   Red (%), Wound Tissue Color 100 %   Periwound Area Intact;Dry;Pink   Wound Edges Open   Wound Length (cm) 6 cm   Wound Width (cm) 2.4 cm   Wound Depth (cm) 0.2 cm   Wound Volume  (cm^3) 2.88 cm^3   Wound Surface Area (cm^2) 14.4 cm^2        Goals of Care Treatment Preferences:  Code Status: Full Code      Consults:   Consults (From admission, onward)          Status Ordering Provider     Inpatient consult to Registered Dietitian/Nutritionist  Once        Provider:  (Not yet assigned)    Completed ANAHY SINHA     Inpatient consult to Registered Dietitian/Nutritionist  Once        Provider:  (Not yet assigned)    Completed ANHAY SINHA            No new Assessment & Plan notes have been filed under this hospital service since the last note was generated.  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] 11/15/2022 Yes    Open wound of scrotum and testes [S31.30XA] 01/03/2023 Yes    Hyponatremia [E87.1] 12/01/2022 Yes    Chronic diastolic heart failure [I50.32] 11/16/2022 Yes    Coronary artery disease [I25.10] 03/23/2021 Yes    Polycythemia, secondary [D75.1] 03/10/2021 Yes    Essential hypertension [I10] 11/12/2020 Yes    Type 2 diabetes mellitus with hyperglycemia, with long-term current use of insulin [E11.65, Z79.4] 11/12/2020 Not Applicable    NAFLD (nonalcoholic fatty liver disease) [K76.0] 08/21/2020 Yes      Problems Resolved During this Admission:       Discharged Condition: good    Disposition: [facility]-Health Care [facility]    Follow Up:   Follow-up Information       Tushar Drew MD. Schedule an appointment as soon as possible for a visit.    Specialty: Internal Medicine  Why: Patient will be seen on 01/24/23 at 1:30PM  Contact information:  [place] [facility] [address]  [phone]               [facility] Follow up.    Specialty: Home Health Services  Why: Patient will be seen on 01/20/23.  Contact information:  [address] [place] [address].  [address]  [facility] [address]  [phone]                           Patient Instructions:      WALKER FOR HOME USE     Order  "Specific Question Answer Comments   Type of Walker: Adult (5'4"-6'6")    With wheels? Yes    Height: 5' 11" (1.803 m)    Weight: 93.3 kg (205 lb 11 oz)    Length of need (1-99 months): 99    Does patient have medical equipment at home? oxygen transport chair / transport chair / transport chair   Does patient have medical equipment at home? cane, straight    Does patient have medical equipment at home? crutches, axillary    Please check all that apply: Patient's condition impairs ambulation.    Please check all that apply: Patient is unable to safely ambulate without equipment.    Please check all that apply: Walker will be used for gait training.      WHEELCHAIR FOR HOME USE     Order Specific Question Answer Comments   Hours in W/C per day: 4    Type of Wheelchair: Lightweight    Patient unable to propel in Standard wheelchair? Yes    Size(Width): 18"(STD adult)    Leg Support: STD footrests    Leg Support: Swing Away    Arm Height: Full length    Arm Height: Swing away    Lap Belt: Buckle    Accessories: None    Cushion: Basic    Justification for cushion: Prevent pressure ulcers    Height: 5' 11" (1.803 m)    Weight: 93.3 kg (205 lb 11 oz)    Does patient have medical equipment at home? oxygen transport chair / transport chair / transport chair   Does patient have medical equipment at home? cane, straight    Does patient have medical equipment at home? crutches, axillary    Length of need (1-99 months): 99    Please check all that apply: Caregiver is capable and willing to operate wheelchair safely.    Please check all that apply: The patient requires the use of a w/c for activities of daily living within the Home.    Please check all that apply: Patient mobility limitations cannot be sufficiently resolved by the use of other ambulatory therapies.      TRANSFER TUB BENCH FOR HOME USE     Order Specific Question Answer Comments   Type of Transfer Tub Bench: Unpadded    Height: 5' 11" (1.803 m)    Weight: 93.3 kg " (205 lb 11 oz)    Does patient have medical equipment at home? oxygen transport chair / transport chair / transport chair   Does patient have medical equipment at home? cane, straight    Does patient have medical equipment at home? crutches, leela    Length of need (1-99 months): 99    Patient notified - Not covered by insurance considered a convenience item Yes    Discussed financial responsibility with responsible party Yes      No driving until:   Order Comments: Cleared by PCP     Notify your health care provider if you experience any of the following:  temperature >100.4     Notify your health care provider if you experience any of the following:  persistent nausea and vomiting or diarrhea     Notify your health care provider if you experience any of the following:  severe uncontrolled pain     Notify your health care provider if you experience any of the following:  redness, tenderness, or signs of infection (pain, swelling, redness, odor or green/yellow discharge around incision site)     Notify your health care provider if you experience any of the following:  difficulty breathing or increased cough     Notify your health care provider if you experience any of the following:  severe persistent headache     Notify your health care provider if you experience any of the following:  worsening rash     Notify your health care provider if you experience any of the following:  persistent dizziness, light-headedness, or visual disturbances     Notify your health care provider if you experience any of the following:  increased confusion or weakness     Activity as tolerated           Pending Diagnostic Studies:       None           Medications:  Reconciled Home Medications:      Medication List        START taking these medications      ascorbic acid (vitamin C) 500 MG tablet  Commonly known as: VITAMIN C  Take 1 tablet (500 mg total) by mouth once daily.     diltiaZEM 120 MG Cp24  Commonly known as: CARDIZEM  CD  Take 1 capsule (120 mg total) by mouth every 12 (twelve) hours.     metoprolol tartrate 50 MG tablet  Commonly known as: LOPRESSOR  Take 1 tablet (50 mg total) by mouth every 12 (twelve) hours.     oxyCODONE 20 mg Tab immediate release tablet  Commonly known as: ROXICODONE  Take 1 tablet (20 mg total) by mouth every 4 (four) hours as needed for Pain.     senna-docusate 8.6-50 mg 8.6-50 mg per tablet  Commonly known as: PERICOLACE  Take 1 tablet by mouth 2 (two) times daily.     zinc sulfate 50 mg zinc (220 mg) capsule  Commonly known as: ZINCATE  Take 1 capsule (220 mg total) by mouth once daily. for 14 days            CHANGE how you take these medications      NovoLOG PenFill U-100 Insulin 100 unit/mL Crtg  Generic drug: insulin aspart U-100  INJECT 14 UNITS INTO THE SKIN THREE TIMES A DAY WITH MEALS.  What changed: additional instructions     * predniSONE 10 MG tablet  Commonly known as: DELTASONE  Take 1 tablet (10 mg total) by mouth once daily. for 1 day  What changed:   Another medication with the same name was changed. Make sure you understand how and when to take each.  Another medication with the same name was removed. Continue taking this medication, and follow the directions you see here.     * predniSONE 5 MG tablet  Commonly known as: DELTASONE  Take 1 tablet (5 mg total) by mouth once daily.  Start taking on: January 20, 2023  What changed:   medication strength  how much to take  Another medication with the same name was removed. Continue taking this medication, and follow the directions you see here.     vortioxetine 10 mg Tab  Commonly known as: TRINTELLIX  Take 1 tablet (10 mg total) by mouth once daily.  What changed: when to take this           * This list has 2 medication(s) that are the same as other medications prescribed for you. Read the directions carefully, and ask your doctor or other care provider to review them with you.                CONTINUE taking these medications      aspirin  "81 MG Chew  Take 81 mg by mouth.     blood sugar diagnostic Strp  To check BG 3 times daily, to use with insurance preferred meter     blood-glucose meter kit  Use to check glucose as directed     ciclopirox 8 % Soln  Commonly known as: PENLAC  Apply topically nightly.     clotrimazole 1 % cream  Commonly known as: LOTRIMIN  Apply topically 2 (two) times daily.     digoxin 125 mcg tablet  Commonly known as: LANOXIN  Take 1 tablet (125 mcg total) by mouth once daily.     ergocalciferol 50,000 unit Cap  Commonly known as: ERGOCALCIFEROL  Take 1 capsule (50,000 Units total) by mouth every 7 days.     ezetimibe 10 mg tablet  Commonly known as: ZETIA  TAKE 1 TABLET BY MOUTH EVERY DAY     folic acid 1 MG tablet  Commonly known as: FOLVITE     furosemide 40 MG tablet  Commonly known as: LASIX  Take 1 tablet (40 mg total) by mouth daily as needed (fluid).     insulin admin supplies Inpn  InPen device, use to inject mealtime insulin 3 times daily. 1 pen to use, 1 for backup as needed.     lamiVUDine 150 MG Tab  Commonly known as: EPIVIR  Take 1 tablet (150 mg total) by mouth once daily.     LINZESS 72 mcg Cap capsule  Generic drug: linaCLOtide     multivitamin per tablet  Commonly known as: THERAGRAN  Take 1 tablet by mouth once daily.     nitroGLYCERIN 0.4 MG SL tablet  Commonly known as: NITROSTAT  Place 1 tablet (0.4 mg total) under the tongue every 5 (five) minutes as needed for Chest pain.     nystatin cream  Commonly known as: MYCOSTATIN  Apply topically 3 (three) times daily as needed for Dry Skin.     ondansetron 4 MG tablet  Commonly known as: ZOFRAN  Take 1 tablet (4 mg total) by mouth every 8 (eight) hours as needed for Nausea.     pen needle, diabetic 32 gauge x 5/32" Ndle  Commonly known as: BD ULTRA-FINE RAHUL PEN NEEDLE  Use to inject insulin 5 times daily     potassium chloride 10 MEQ Cpsr  Commonly known as: MICRO-K  Take 10 mEq by mouth once daily.     PROAIR HFA 90 mcg/actuation inhaler  Generic drug: " albuterol     spironolactone 25 MG tablet  Commonly known as: ALDACTONE  TAKE 1 TABLET BY MOUTH EVERY DAY     TRESIBA FLEXTOUCH U-200 200 unit/mL (3 mL) insulin pen  Generic drug: insulin degludec  Inject 50 Units into the skin 2 (two) times a day.     TRULICITY 4.5 mg/0.5 mL pen injector  Generic drug: dulaglutide  Inject 4.5 mg into the skin every 7 days.            STOP taking these medications      hydroCHLOROthiazide 25 MG tablet  Commonly known as: HYDRODIURIL     HYDROcodone-acetaminophen  mg per tablet  Commonly known as: NORCO     imiquimod 5 % cream  Commonly known as: ALDARA     minocycline 100 MG capsule  Commonly known as: MINOCIN,DYNACIN     olmesartan 40 MG tablet  Commonly known as: BENICAR     OXYGEN-AIR DELIVERY SYSTEMS MISC     sulfamethoxazole-trimethoprim 800-160mg 800-160 mg Tab  Commonly known as: BACTRIM DS              Indwelling Lines/Drains at time of discharge:   Lines/Drains/Airways       None                   Time spent on the discharge of patient: 38 minutes         aWndy Veliz NP  Department of [facility] Medicine  [facility]

## 2023-01-19 NOTE — PROGRESS NOTES
"Subjective:       Patient ID: Nithin Wagner is a 67 y.o. male.    Vitals:  height is 5' 11" (1.803 m) and weight is 92.5 kg (204 lb). His temperature is 98.7 °F (37.1 °C). His blood pressure is 152/78 (abnormal) and his pulse is 83. His respiration is 18 and oxygen saturation is 98%.     Chief Complaint: Fall    Patient states he was leaving [facility], states a barrier was on the caution tape. He states to have tried to grab something and lost his balance falling on his hands and knees and then rolling and scratching his face on gravel. Pt denies any LOC, dizziness.     Facial Pain  This is a new problem. The current episode started today. Associated symptoms include arthralgias, joint swelling and myalgias. Pertinent negatives include no abdominal pain, anorexia, chest pain, chills, congestion, coughing, diaphoresis, fatigue, fever, headaches, nausea, neck pain, numbness, rash, sore throat, swollen glands or vomiting. Nothing aggravates the symptoms. He has tried nothing for the symptoms.     Constitution: Negative for appetite change, chills, sweating, fatigue, fever, unexpected weight change and generalized weakness.   HENT:  Positive for facial swelling, facial trauma and nosebleeds. Negative for ear pain, ear discharge, tinnitus, hearing loss, mouth sores, tongue pain, tongue lesion, congestion, foreign body in nose, postnasal drip, sinus pain, sinus pressure, sore throat, trouble swallowing and voice change.    Neck: Negative for neck pain, neck stiffness and painful lymph nodes.   Cardiovascular:  Negative for chest trauma, chest pain, palpitations, sob on exertion and passing out.   Eyes:  Negative for eye trauma, foreign body in eye, eye discharge, eye itching, eye pain, eye redness, photophobia, vision loss, double vision, blurred vision and eyelid swelling.   Respiratory:  Negative for chest tightness, cough, sputum production, shortness of breath and wheezing.    Gastrointestinal:  Negative for " abdominal trauma, abdominal pain, abdominal bloating, nausea, vomiting, constipation and diarrhea.   Musculoskeletal:  Positive for pain, trauma, joint pain, joint swelling, abnormal ROM of joint, arthritis and muscle ache. Negative for pain with walking and muscle cramps.   Skin:  Positive for wound and abrasion. Negative for rash.   Neurological:  Negative for dizziness, light-headedness, passing out, facial drooping, speech difficulty, coordination disturbances, loss of balance, headaches, disorientation, altered mental status, loss of consciousness, numbness, tingling and tremors.   Hematologic/Lymphatic: Negative for swollen lymph nodes, easy bruising/bleeding and trouble clotting. Does not bruise/bleed easily.   Psychiatric/Behavioral:  Negative for altered mental status, disorientation, confusion and agitation.    Past Medical History:   Diagnosis Date    Arthritis     Atrial myxoma     CHF (congestive heart failure)     Coronary atherosclerosis     Diabetes mellitus, type 2     Difficult intubation     Hepatitis B     Hyperlipidemia     Hypertension     Non-alcoholic fatty liver disease     Rheumatoid arthritis     Rheumatoid arthritis flare 07/12/2021    Stroke     TIA    Systolic heart failure        Past Surgical History:   Procedure Laterality Date    CORONARY ANGIOGRAPHY N/A 3/10/2021    Procedure: ANGIOGRAM, CORONARY ARTERY - right radial;  Surgeon: Shemar Dempsey MD;  Location: [facility] CATH LAB;  Service: Cardiology;  Laterality: N/A;    CORONARY STENT PLACEMENT  03/10/2021    prox-mid RCA Marshal 4.5 x 26 mm, 4.5 x 12 mm    INCISION AND DRAINAGE OF SCROTUM N/A 11/15/2022    Procedure: INCISION AND DRAINAGE, SCROTUM;  Surgeon: William Hatch MD;  Location: [facility] OR;  Service: Urology;  Laterality: N/A;    INCISION AND DRAINAGE OF SCROTUM N/A 11/17/2022    Procedure: INCISION AND DRAINAGE, SCROTUM;  Surgeon: William Hatch MD;  Location: [facility] OR;  Service: Urology;  Laterality: N/A;    LUNG LOBECTOMY  Right 2008    RUL lobectomy after removal of atrial myxoma    PLEURA BIOPSY      RESECTION OF ATRIAL MYXOMA  2007       Family History   Problem Relation Age of Onset    Hodgkin's lymphoma Mother     Diabetes type II Mother     Kidney failure Father     Diabetes type I Father     Cancer Sister        Social History     Socioeconomic History    Marital status: Single   Occupational History    Occupation: , respiratory therapist, founded [place]     Comment: Retired   Tobacco Use    Smoking status: Some Days     Packs/day: 1.00     Years: 35.00     Pack years: 35.00     Types: Cigarettes    Smokeless tobacco: Never   Substance and Sexual Activity    Alcohol use: Not Currently    Drug use: No    Sexual activity: Never     Social Determinants of Health     Financial Resource Strain: High Risk    Difficulty of Paying Living Expenses: Hard   Food Insecurity: No Food Insecurity    Worried About Running Out of Food in the Last Year: Never true    Ran Out of Food in the Last Year: Never true   Transportation Needs: Unmet Transportation Needs    Lack of Transportation (Medical): Yes    Lack of Transportation (Non-Medical): Yes   Physical Activity: Inactive    Days of Exercise per Week: 0 days    Minutes of Exercise per Session: 0 min   Stress: Stress Concern Present    Feeling of Stress : To some extent   Social Connections: Socially Isolated    Frequency of Communication with Friends and Family: More than three times a week    Frequency of Social Gatherings with Friends and Family: More than three times a week    Attends Rastafarian Services: Never    Active Member of Clubs or Organizations: No    Attends Club or Organization Meetings: Never    Marital Status: Never    Housing Stability: High Risk    Unable to Pay for Housing in the Last Year: Yes    Number of Places Lived in the Last Year: 1    Unstable Housing in the Last Year: No       Current Outpatient Medications   Medication Sig Dispense  Refill    ascorbic acid, vitamin C, (VITAMIN C) 500 MG tablet Take 1 tablet (500 mg total) by mouth once daily. 30 tablet 0    aspirin 81 MG Chew Take 81 mg by mouth.      blood sugar diagnostic Strp To check BG 3 times daily, to use with insurance preferred meter 300 each 11    blood-glucose meter kit Use to check glucose as directed 1 each 0    ciclopirox (PENLAC) 8 % Soln Apply topically nightly. 6.6 mL 11    clotrimazole (LOTRIMIN) 1 % cream Apply topically 2 (two) times daily. 60 g 0    digoxin (LANOXIN) 125 mcg tablet Take 1 tablet (125 mcg total) by mouth once daily. 90 tablet 3    diltiaZEM (CARDIZEM CD) 120 MG Cp24 Take 1 capsule (120 mg total) by mouth every 12 (twelve) hours. 60 capsule 11    dulaglutide (TRULICITY) 4.5 mg/0.5 mL pen injector Inject 4.5 mg into the skin every 7 days. 12 pen 3    ergocalciferol (ERGOCALCIFEROL) 50,000 unit Cap Take 1 capsule (50,000 Units total) by mouth every 7 days. 12 capsule 3    ezetimibe (ZETIA) 10 mg tablet TAKE 1 TABLET BY MOUTH EVERY DAY 30 tablet 8    folic acid (FOLVITE) 1 MG tablet   4    furosemide (LASIX) 40 MG tablet Take 1 tablet (40 mg total) by mouth daily as needed (fluid). 90 tablet 3    insulin admin supplies InPn InPen device, use to inject mealtime insulin 3 times daily. 1 pen to use, 1 for backup as needed. 2 each 3    insulin degludec (TRESIBA FLEXTOUCH U-200) 200 unit/mL (3 mL) insulin pen Inject 50 Units into the skin 2 (two) times a day. 15 pen 3    lamiVUDine (EPIVIR) 150 MG Tab Take 1 tablet (150 mg total) by mouth once daily. 60 tablet 11    LINZESS 72 mcg Cap capsule       metoprolol tartrate (LOPRESSOR) 50 MG tablet Take 1 tablet (50 mg total) by mouth every 12 (twelve) hours. 60 tablet 11    multivitamin (THERAGRAN) per tablet Take 1 tablet by mouth once daily.      nitroGLYCERIN (NITROSTAT) 0.4 MG SL tablet Place 1 tablet (0.4 mg total) under the tongue every 5 (five) minutes as needed for Chest pain. 25 tablet 11    NOVOLOG PENFILL U-100  "INSULIN 100 unit/mL Crtg INJECT 14 UNITS INTO THE SKIN THREE TIMES A DAY WITH MEALS. 30 each 11    nystatin (MYCOSTATIN) cream Apply topically 3 (three) times daily as needed for Dry Skin. 2 each 11    ondansetron (ZOFRAN) 4 MG tablet Take 1 tablet (4 mg total) by mouth every 8 (eight) hours as needed for Nausea. 60 tablet 6    oxyCODONE (ROXICODONE) 20 mg Tab immediate release tablet Take 1 tablet (20 mg total) by mouth every 4 (four) hours as needed for Pain. 42 tablet 0    pen needle, diabetic (BD ULTRA-FINE RAHUL PEN NEEDLE) 32 gauge x 5/32" Ndle Use to inject insulin 5 times daily 500 each 3    potassium chloride (MICRO-K) 10 MEQ CpSR Take 10 mEq by mouth once daily.      predniSONE (DELTASONE) 10 MG tablet Take 1 tablet (10 mg total) by mouth once daily. for 1 day 1 tablet 0    [START ON 1/20/2023] predniSONE (DELTASONE) 5 MG tablet Take 1 tablet (5 mg total) by mouth once daily. 30 tablet 1    PROAIR HFA 90 mcg/actuation inhaler       senna-docusate 8.6-50 mg (PERICOLACE) 8.6-50 mg per tablet Take 1 tablet by mouth 2 (two) times daily.      spironolactone (ALDACTONE) 25 MG tablet TAKE 1 TABLET BY MOUTH EVERY DAY 90 tablet 3    vortioxetine (TRINTELLIX) 10 mg Tab Take 1 tablet (10 mg total) by mouth once daily. 30 tablet 0    zinc sulfate (ZINCATE) 50 mg zinc (220 mg) capsule Take 1 capsule (220 mg total) by mouth once daily. for 14 days 14 capsule 0    cephALEXin (KEFLEX) 500 MG capsule Take 1 capsule (500 mg total) by mouth every 8 (eight) hours. for 7 days 21 capsule 0     No current facility-administered medications for this visit.       Review of patient's allergies indicates:   Allergen Reactions    Enbrel [etanercept] Shortness Of Breath     CHF    Nsaids (non-steroidal anti-inflammatory drug) Other (See Comments)     hypertention  Other reaction(s): Other (See Comments)  hypertention  HTN    Statins-hmg-coa reductase inhibitors Other (See Comments)     Heart arrythemias  Other reaction(s): Other (See " Comments)  Heart arrythemias  Joint pain and cardiac arrythmias    Pcn [penicillins] Rash         Objective:      Physical Exam   Constitutional: He is oriented to person, place, and time. He is cooperative.  Non-toxic appearance. He does not appear ill. No distress. awake  HENT:   Head: Normocephalic. Head is with abrasion. Head is without raccoon's eyes, without Martinez's sign, without contusion and without laceration.       Ears:   Right Ear: Hearing, tympanic membrane, external ear and ear canal normal. impacted cerumen  Left Ear: Hearing, tympanic membrane, external ear and ear canal normal. impacted cerumen  Nose: No mucosal edema, rhinorrhea, purulent discharge, nose lacerations, sinus tenderness, nasal deformity, septal deviation, nasal septal hematoma or congestion. Epistaxis is observed.  No foreign bodies. Right sinus exhibits no maxillary sinus tenderness and no frontal sinus tenderness. Left sinus exhibits no maxillary sinus tenderness and no frontal sinus tenderness.   Mouth/Throat: Uvula is midline, oropharynx is clear and moist and mucous membranes are normal. Mucous membranes are moist. He does not have dentures. No oral lesions. Normal dentition. No dental caries. No oropharyngeal exudate or posterior oropharyngeal erythema.   Eyes: Right eye visual fields normal and left eye visual fields normal. Conjunctivae and lids are normal. Pupils are equal, round, and reactive to light. Right eye exhibits no chemosis, no discharge and no exudate. No foreign body present in the right eye. Left eye exhibits no chemosis, no discharge and no exudate. No foreign body present in the left eye. Right conjunctiva is not injected. Right conjunctiva has no hemorrhage. Left conjunctiva is not injected. Left conjunctiva has no hemorrhage. Right eye exhibits normal extraocular motion and no nystagmus. Left eye exhibits normal extraocular motion and no nystagmus.   Fundoscopic exam:       The right eye shows no arteriolar  narrowing, no AV nicking, no exudate, no hemorrhage and no papilledema. The right eye shows red reflex present.        The left eye shows no arteriolar narrowing, no AV nicking, no exudate, no hemorrhage and no papilledema. The left eye shows red reflex present. Extraocular movement intact vision grossly intact gaze aligned appropriately periorbital hyperpigmentation  Neck: Trachea normal. No crepitus. There are signs of injury.     No edema present. No erythema present. No neck rigidity present. No decreased range of motion present. No spinous process tenderness present. muscular tenderness present.   Cardiovascular: Normal rate, regular rhythm, normal heart sounds and normal pulses.   No murmur heard.Exam reveals no distant heart sounds.   Pulmonary/Chest: Effort normal and breath sounds normal.   Abdominal: Normal appearance. Soft. flat abdomen There is no abdominal tenderness.   Musculoskeletal:      Right shoulder: He exhibits decreased range of motion, tenderness and bony tenderness. He exhibits no swelling, no effusion, no crepitus, no deformity, no laceration, normal pulse and normal strength.      Left shoulder: He exhibits decreased range of motion, tenderness and bony tenderness. He exhibits no swelling, no effusion, no crepitus, no deformity, no laceration, normal pulse and normal strength.      Right elbow: Normal.     Left elbow: Normal.      Right wrist: Normal.      Left wrist: Normal.      Right hip: Normal.      Left hip: Normal.      Right knee: Normal.      Left knee: Normal.      Right ankle: Normal.      Left ankle: Normal.      Thoracic back: Normal.      Lumbar back: Normal.      Right upper arm: Normal.      Left upper arm: Normal.      Right forearm: Normal.      Left forearm: Normal.        Arms:       Right hand: He exhibits tenderness, bony tenderness and swelling. He exhibits normal range of motion, normal capillary refill, no deformity and no laceration. Normal sensation noted.  Decreased sensation is not present in the ulnar distribution, is not present in the medial distribution and is not present in the radial distribution. Normal strength noted. Right index finger: Exhibits tenderness and swelling. There is tenderness of the PIP and DIP joint. Motor /Testing: The patient has normal right wrist strength.      Left hand: Normal. He exhibits normal capillary refill. Decreased sensation is not present in the ulnar distribution, is not present in the medial redistribution and is not present in the radial distribution. Motor /Testing: The patient has normal left wrist strength.        Hands:       Right upper leg: Normal.      Left upper leg: Normal.      Right lower leg: Normal.      Left lower leg: Normal.      Right foot: Normal. Right great toe: Exhibits swelling and bleeding. Injuries: abrasion. Plantar foot sensation: normal.      Left foot: Normal. Plantar foot sensation: normal.   Lymphadenopathy:     He has no cervical adenopathy.   Neurological: no focal deficit. He is alert, oriented to person, place, and time and at baseline. GCS eye subscore is 4. GCS verbal subscore is 5. GCS motor subscore is 6.   Skin: Skin is warm, dry and not diaphoretic. Capillary refill takes less than 2 seconds. Abrasions - head:  forehead, face, nose and chin      Abrasions - upper ext.:  hand (right) and hand (left)  Abrasions - lower ext.:  knee (left) and foot (right)  Psychiatric: He experiences Normal attention and Normal perception. His speech is normal and behavior is normal. Mood, memory, affect, judgment and thought content normal. Cognition normal  Nursing note and vitals reviewed.      Assessment:       1. Fall, initial encounter    2. Acute pain of left shoulder    3. Acute pain of right shoulder    4. Abrasions of multiple sites    5. Pain of finger of right hand          Plan:         Fall, initial encounter  -     XR SHOULDER TRAUMA 3 VIEW LEFT; Future; Expected date: 01/19/2023  -     XR  SHOULDER TRAUMA 3 VIEW RIGHT; Future; Expected date: 01/19/2023    Acute pain of left shoulder  -     XR SHOULDER TRAUMA 3 VIEW LEFT; Future; Expected date: 01/19/2023    Acute pain of right shoulder  -     XR SHOULDER TRAUMA 3 VIEW RIGHT; Future; Expected date: 01/19/2023    Abrasions of multiple sites  -     cephALEXin (KEFLEX) 500 MG capsule; Take 1 capsule (500 mg total) by mouth every 8 (eight) hours. for 7 days  Dispense: 21 capsule; Refill: 0    Pain of finger of right hand  -     X-Ray Finger 2 or More Views Right; Future; Expected date: 01/19/2023       - the patient does not wish to get xrays completed today and will have them scheduled for tomorrow instead. Wounds were cleaned and dressed.     Patient Instructions   Keep abrasions cleaned an covered for the next 48 hours. Take your pain medications as directed previously. Start taking keflex to prevent infection to your abrasions. Obtain your xrays tomorrow and will follow up with results once available. Call [phone] to schedule your xrays. Follow up as needed

## 2023-01-19 NOTE — NURSING
Discharge Note:  Mr Wagner received d/c AVS form, appointment information, prescription information, and verbal teachings (fall prevention, wound care, medications to STOP taking, s/s of infection, blood sugar monitoring, CHANGES in how some mediations are taken). Next scheduled dose of each medication transcribed onto AVS form. Pt instructed to take medications as ordered. Pt bottle of Linzess returned to him. Mr Wagner was escorted off unit in stable condition and assisted into family's vehicle per staff.

## 2023-01-19 NOTE — HOSPITAL COURSE
Patient progressed well with PT and OT- last PT note states that patient ambulated***. Patient had no significant events during their stay at SNF.  Patient's groin wound continued to heal while at SNF.  At time of discharge, patient was performing his own dressing changes.  Patient is sitting himself up a follow-up appointment with his surgeon.  Patient's pain is much better controlled, he is no longer taking long-acting oxycodone.  Home health was set up. DME was ordered if needed. Follow up appointment to be made by patient within one week. All prescriptions and discharge instructions were ordered to be given to the patient prior to discharge.

## 2023-01-19 NOTE — PT/OT/SLP PROGRESS
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  He presents with limitations in performance of self-care, functional mobility, and ADLs. Performance deficits affecting function are weakness, impaired self care skills, impaired functional mobility, impaired cardiopulmonary response to activity, impaired endurance, pain. Pt tolerated Tx without incident, however requires v/c for safety. Pt is making progress but continues to require assist to perform self care tasks, functional mobility and functional transfers. Pt would continue to benefit from OT intervention to further functional (I)ce and safety.    Rehab Potential is good    Activity tolerance:  Fair    Plan:     Patient to be seen 5 x/week to address the above listed problems via self-care/home management, therapeutic activities, therapeutic exercises    Plan of Care Expires: 01/31/23  Plan of Care Reviewed with: patient    Subjective     Communicated with: Nursing prior to session.    Pain/Comfort:  Pain Rating 1:  (did not rate)  Pain Rating Post-Intervention 1:  (did not rate)    Patient's cultural, spiritual, Congregational conflicts given the current situation:  yes    Objective:     Patient found supine with  (no active lines) upon OT entry to room.    Bed Mobility:    Patient completed Scooting/Bridging with modified independence  Patient completed Supine to Sit with modified independence      Functional Mobility/Transfers:  Patient completed Sit <> Stand Transfer with modified independence  with  no assistive device   Patient completed  Shower Transfer Step Transfer technique with supervision with no AD  Functional Mobility: Pt ambulated around room with no AD with supervision and v/c for safety    Activities of Daily Living:  Grooming: modified independence standing sinkside  Bathing: modified independence seated on shower bench with handheld shower head  Upper Body Dressing: modified independence    Lower Body Dressing: modified independence    Pt completed wound dressing with modified independence     AMPAC 6 Click ADL: 21    Treatment & Education:  Pt educated on role of OT, safety while performing functional transfers, safety while performing self care tasks, and progress towards OT goals    Patient left sitting edge of bed with call button in reach    GOALS:   Multidisciplinary Problems       Occupational Therapy Goals          Problem: Occupational Therapy    Goal Priority Disciplines Outcome Interventions   Occupational Therapy Goal     OT, PT/OT Ongoing, Progressing    Description: Goals to be met by: 1/30/23     Patient will increase functional independence with ADLs by performing:    LE Dressing with Modified [place]. -MET  Grooming while standing at sink with Supervision. -MET  Toileting from toilet with Supervision for hygiene and clothing management. -MET  Bathing from  shower chair/bench with Supervision. -MET  Step transfer with Modified [place] -MET  Upper extremity exercise program, with [place]. -MET  Caregiver will be educated on level of assistance required to safely perform self care and functional transfers. -NOT MET                        Time Tracking:     OT Date of Treatment: 01/19/23  OT Start Time: 0845    OT Stop Time: 0919  OT Total Time (min): 34 min    Billable Minutes:Self Care/Home Management 34    1/19/2023

## 2023-01-19 NOTE — PT/OT/SLP PROGRESS
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  Pt doing well functionally.    .      Performance deficits affecting function: weakness, impaired endurance, gait instability, pain, decreased ROM, impaired skin, impaired cardiopulmonary response to activity.    Rehab Potential is good    Activity Tolerance: Good    Plan:     Patient to be seen 6 x/week to address the above listed problems via gait training, therapeutic activities, therapeutic exercises, neuromuscular re-education    Plan of Care Expires: 01/17/23  Plan of Care Reviewed with: patient    Subjective     "I'm feeling much better".     Pain/Comfort:  Pain Rating 1: 8/10  Location - Side 1: Bilateral  Location - Orientation 1: generalized  Location 1:  ("all joints and scrotum")  Pain Addressed 1: Pre-medicate for activity, Distraction, Cessation of Activity  Pain Rating Post-Intervention 1:  (no rating provided)    Patient's cultural, spiritual, Rastafari conflicts given the current situation:  no    Objective:       Patient found  walking out of restroom  with  (no active lines) upon PT entry to room.     Functional Mobility:  Bed Mobility:     Rolling Left:  independence  Rolling Right: independence  Supine to Sit: independence  Sit to Supine: independence  Transfers:     Sit to Stand:  independence with no AD  Bed to Chair: independence with  no AD  using  Step Transfer  Gait: RW mod I 250ft  Stairs: NP 2* pt refusal " "stating "I'd rather not do it because I don't want to wear myself out.  I got a lot of stuff I have to take care of today"  Wheelchair Propulsion:  Pt propelled Standard wheelchair x 200 feet on Level tile with  Bilateral upper extremity with Modified Independent.     AM-PAC 6 CLICK MOBILITY  23    Patient left sitting edge of bed with call button in reach.    GOALS:   Multidisciplinary Problems       Physical Therapy Goals          Problem: Physical Therapy    Goal Priority Disciplines Outcome Goal Variances Interventions   Physical Therapy Goal     PT, PT/OT Ongoing, Progressing     Description: Goals to be met by: 2023     Patient will increase functional independence with mobility by performin. Sit to stand transfer with Modified [place] -MET  CFW  2. Bed to chair transfer with Modified [place] using No Assistive Device -Ongoing  3. Gait  x 150 feet with Modified [place] using rolling walker or cane. -Ongoing  4. Wheelchair propulsion x150 feet with Modified [place] -Ongoing  5. Stand for 10 minutes with [place] -MET CFW (HARMONY)  New Goal: 23 Ascend/descend 12 steps with B HR and Supervision - Ongoing                         Time Tracking:     PT Received On: 23  PT Start Time: 928  PT Stop Time: 943  PT Total Time (min): 15 min    Billable Minutes: Therapeutic Activity 15    Treatment Type: Treatment  PT/PTA: PTA     PTA Visit Number: 3     2023  "

## 2023-01-20 PROCEDURE — G0180 PR HOME HEALTH MD CERTIFICATION: ICD-10-PCS | Mod: ,,, | Performed by: HOSPITALIST

## 2023-01-20 PROCEDURE — G0180 MD CERTIFICATION HHA PATIENT: HCPCS | Mod: ,,, | Performed by: HOSPITALIST

## 2023-01-20 NOTE — PATIENT INSTRUCTIONS
Keep abrasions cleaned an covered for the next 48 hours. Take your pain medications as directed previously. Start taking keflex to prevent infection to your abrasions. Obtain your xrays tomorrow and will follow up with results once available. Call [phone] to schedule your xrays. Follow up as needed

## 2023-01-21 ENCOUNTER — PATIENT MESSAGE (OUTPATIENT)
Dept: RHEUMATOLOGY | Facility: CLINIC | Age: 68
End: 2023-01-21
Payer: MEDICARE

## 2023-01-21 ENCOUNTER — NURSE TRIAGE (OUTPATIENT)
Dept: ADMINISTRATIVE | Facility: CLINIC | Age: 68
End: 2023-01-21
Payer: MEDICARE

## 2023-01-21 DIAGNOSIS — I50.22 CHRONIC SYSTOLIC HEART FAILURE: ICD-10-CM

## 2023-01-21 RX ORDER — DIGOXIN 125 MCG
125 TABLET ORAL DAILY
Qty: 90 TABLET | Refills: 3 | Status: CANCELLED | OUTPATIENT
Start: 2023-01-21 | End: 2024-01-21

## 2023-01-21 RX ORDER — FUROSEMIDE 40 MG/1
40 TABLET ORAL DAILY PRN
Qty: 90 TABLET | Refills: 3 | Status: CANCELLED | OUTPATIENT
Start: 2023-01-21

## 2023-01-21 NOTE — TELEPHONE ENCOUNTER
Pt calling for information on apt today a 4pm. Would like to know if he can have all of his x-rays competed at once. I have given him general information in this regard. He will follow up with [facility].     Reason for Disposition   Health Information question, no triage required and triager able to answer question    Protocols used: Information Only Call - No Triage-A-

## 2023-01-22 ENCOUNTER — OFFICE VISIT (OUTPATIENT)
Dept: URGENT CARE | Facility: CLINIC | Age: 68
End: 2023-01-22
Payer: MEDICARE

## 2023-01-22 VITALS
SYSTOLIC BLOOD PRESSURE: 90 MMHG | DIASTOLIC BLOOD PRESSURE: 56 MMHG | HEART RATE: 110 BPM | WEIGHT: 206 LBS | BODY MASS INDEX: 27.9 KG/M2 | OXYGEN SATURATION: 95 % | HEIGHT: 72 IN | TEMPERATURE: 98 F | RESPIRATION RATE: 18 BRPM

## 2023-01-22 DIAGNOSIS — T14.90XA TRAUMA: Primary | ICD-10-CM

## 2023-01-22 DIAGNOSIS — L03.90 CELLULITIS, UNSPECIFIED CELLULITIS SITE: ICD-10-CM

## 2023-01-22 PROCEDURE — 73560 X-RAY EXAM OF KNEE 1 OR 2: CPT | Mod: RT,S$GLB,, | Performed by: RADIOLOGY

## 2023-01-22 PROCEDURE — 73560 XR KNEE 1 OR 2 VIEW RIGHT: ICD-10-PCS | Mod: RT,S$GLB,, | Performed by: RADIOLOGY

## 2023-01-22 PROCEDURE — 73560 XR KNEE 1 OR 2 VIEW LEFT: ICD-10-PCS | Mod: LT,S$GLB,, | Performed by: RADIOLOGY

## 2023-01-22 PROCEDURE — 99214 PR OFFICE/OUTPT VISIT, EST, LEVL IV, 30-39 MIN: ICD-10-PCS | Mod: S$GLB,,, | Performed by: FAMILY MEDICINE

## 2023-01-22 PROCEDURE — 3074F PR MOST RECENT SYSTOLIC BLOOD PRESSURE < 130 MM HG: ICD-10-PCS | Mod: CPTII,S$GLB,, | Performed by: FAMILY MEDICINE

## 2023-01-22 PROCEDURE — 3008F PR BODY MASS INDEX (BMI) DOCUMENTED: ICD-10-PCS | Mod: CPTII,S$GLB,, | Performed by: FAMILY MEDICINE

## 2023-01-22 PROCEDURE — 73130 XR HAND COMPLETE 3 VIEW RIGHT: ICD-10-PCS | Mod: RT,S$GLB,, | Performed by: RADIOLOGY

## 2023-01-22 PROCEDURE — 3074F SYST BP LT 130 MM HG: CPT | Mod: CPTII,S$GLB,, | Performed by: FAMILY MEDICINE

## 2023-01-22 PROCEDURE — 73560 X-RAY EXAM OF KNEE 1 OR 2: CPT | Mod: LT,S$GLB,, | Performed by: RADIOLOGY

## 2023-01-22 PROCEDURE — 1159F MED LIST DOCD IN RCRD: CPT | Mod: CPTII,S$GLB,, | Performed by: FAMILY MEDICINE

## 2023-01-22 PROCEDURE — 3008F BODY MASS INDEX DOCD: CPT | Mod: CPTII,S$GLB,, | Performed by: FAMILY MEDICINE

## 2023-01-22 PROCEDURE — 3078F PR MOST RECENT DIASTOLIC BLOOD PRESSURE < 80 MM HG: ICD-10-PCS | Mod: CPTII,S$GLB,, | Performed by: FAMILY MEDICINE

## 2023-01-22 PROCEDURE — 3078F DIAST BP <80 MM HG: CPT | Mod: CPTII,S$GLB,, | Performed by: FAMILY MEDICINE

## 2023-01-22 PROCEDURE — 73130 X-RAY EXAM OF HAND: CPT | Mod: RT,S$GLB,, | Performed by: RADIOLOGY

## 2023-01-22 PROCEDURE — 1159F PR MEDICATION LIST DOCUMENTED IN MEDICAL RECORD: ICD-10-PCS | Mod: CPTII,S$GLB,, | Performed by: FAMILY MEDICINE

## 2023-01-22 PROCEDURE — 99214 OFFICE O/P EST MOD 30 MIN: CPT | Mod: S$GLB,,, | Performed by: FAMILY MEDICINE

## 2023-01-22 RX ORDER — IOHEXOL 350 MG/ML
INJECTION, SOLUTION INTRAVENOUS
COMMUNITY
Start: 2022-10-20 | End: 2023-02-06 | Stop reason: ALTCHOICE

## 2023-01-22 RX ORDER — AZITHROMYCIN 250 MG/1
TABLET, FILM COATED ORAL
COMMUNITY
Start: 2022-10-21 | End: 2023-01-25 | Stop reason: ALTCHOICE

## 2023-01-22 RX ORDER — SULFAMETHOXAZOLE AND TRIMETHOPRIM 800; 160 MG/1; MG/1
1 TABLET ORAL 2 TIMES DAILY
Qty: 20 TABLET | Refills: 0 | Status: ON HOLD | OUTPATIENT
Start: 2023-01-22 | End: 2023-01-29 | Stop reason: SDUPTHER

## 2023-01-22 NOTE — PROGRESS NOTES
Subjective:       Patient ID: Nithin Wagner is a 67 y.o. male.    Vitals:  vitals were not taken for this visit.     Chief Complaint: No chief complaint on file.    Pt c/o Pain in the right arm right knee , left knee as well , and face is injured body is in pain he fell in [facility] parking lot and he is asking can [employer] pay for his visit he fell on Thursday. His pain started to get worse on friday and now he can not move the body parts that is hurting.He did come in on Thursday evening he fell at [facility] second floor parking lot Pt did get medication prescribed and it is not  working Pt is having sweats so he thinks he has been having a on and off fever     Fall  Incident onset: 4days ago. Fall occurred: on [facility] ([facility] ) He fell from an unknown height. He landed on [place]. The point of impact was the face, left shoulder, left knee, right shoulder, right elbow, right knee, left elbow, right wrist, head and left wrist. The pain is present in the face, right shoulder, head, left elbow, left wrist, nose, chin, left hand, left upper leg, right upper arm, right elbow, right wrist and left knee. The pain is at a severity of 9/10. The pain is mild. Associated symptoms include a fever, headaches, nausea, numbness and tingling. He has tried nothing for the symptoms.     Constitution: Positive for fever.   Gastrointestinal:  Positive for nausea.   Skin:  Positive for erythema.   Neurological:  Positive for headaches and numbness.     Objective:      Physical Exam   Constitutional:  Non-toxic appearance. He does not appear ill. No distress. normal  HENT:   Head: Normocephalic and atraumatic.       Nose: No rhinorrhea or congestion.   Healing abrasions on the face      Comments: Healing abrasions on the face  Eyes: Conjunctivae are normal. Pupils are equal, round, and reactive to light. Extraocular movement intact   Neck: Neck supple.   Cardiovascular: Normal rate, regular rhythm and normal pulses.    Pulmonary/Chest: Effort normal and breath sounds normal. No respiratory distress.   Abdominal: Normal appearance and bowel sounds are normal. Soft. flat abdomen   Musculoskeletal:         General: Swelling, tenderness and signs of injury present. No deformity.      Right wrist: He exhibits decreased range of motion and swelling.      Left wrist: Normal. He exhibits normal range of motion and no swelling.      Right hand: He exhibits decreased range of motion, tenderness and swelling. He exhibits normal capillary refill. Normal sensation noted. Comments: Mild swelling in the denoted fingers  With erythema      Left hand: He exhibits normal range of motion, no tenderness, normal capillary refill and no swelling. Normal sensation noted.        Hands:       Right lower leg: No edema.      Left lower leg: No edema.   Neurological: He is alert.   Skin: Skin is warm, dry, intact and not diaphoretic. Capillary refill takes less than 2 seconds. erythema        Nursing note and vitals reviewed.      Assessment:Plan:     1. Trauma  - X-Ray Hand 3 view Right; Future  - X-Ray Knee 1 or 2 View Right; Future  - X-Ray Knee 1 or 2 View Left; Future    2. Cellulitis, unspecified cellulitis site  - sulfamethoxazole-trimethoprim 800-160mg (BACTRIM DS) 800-160 mg Tab; Take 1 tablet by mouth 2 (two) times daily.  Dispense: 20 tablet; Refill: 0   All results discussed prior to patient's discharge from our clinic

## 2023-01-23 ENCOUNTER — TELEPHONE (OUTPATIENT)
Dept: URGENT CARE | Facility: CLINIC | Age: 68
End: 2023-01-23
Payer: MEDICARE

## 2023-01-23 RX ORDER — ONDANSETRON 4 MG/1
4 TABLET, FILM COATED ORAL EVERY 8 HOURS PRN
Qty: 60 TABLET | Refills: 6 | Status: SHIPPED | OUTPATIENT
Start: 2023-01-23 | End: 2023-11-24

## 2023-01-23 NOTE — TELEPHONE ENCOUNTER
results reviewed, pt states that he saw a provider yesterday but no note is available. he did not get the shoulder xrays compelted and advised that he should go over today and get them done at his closest urgent care. pt already aware of his results

## 2023-01-25 ENCOUNTER — CARE AT HOME (OUTPATIENT)
Dept: HOME HEALTH SERVICES | Facility: CLINIC | Age: 68
End: 2023-01-25
Payer: MEDICARE

## 2023-01-25 ENCOUNTER — PATIENT MESSAGE (OUTPATIENT)
Dept: RHEUMATOLOGY | Facility: CLINIC | Age: 68
End: 2023-01-25
Payer: MEDICARE

## 2023-01-25 ENCOUNTER — PES CALL (OUTPATIENT)
Dept: ADMINISTRATIVE | Facility: CLINIC | Age: 68
End: 2023-01-25
Payer: MEDICARE

## 2023-01-25 DIAGNOSIS — N49.3 FOURNIER'S GANGRENE IN MALE: ICD-10-CM

## 2023-01-25 DIAGNOSIS — E78.2 MIXED HYPERLIPIDEMIA: ICD-10-CM

## 2023-01-25 DIAGNOSIS — T07.XXXA ABRASIONS OF MULTIPLE SITES: Primary | ICD-10-CM

## 2023-01-25 DIAGNOSIS — Z72.0 TOBACCO ABUSE: ICD-10-CM

## 2023-01-25 DIAGNOSIS — T14.90XA TRAUMA: ICD-10-CM

## 2023-01-25 PROCEDURE — 99406 PR TOBACCO USE CESSATION INTERMEDIATE 3-10 MINUTES: ICD-10-PCS | Mod: S$GLB,,, | Performed by: NURSE PRACTITIONER

## 2023-01-25 PROCEDURE — 1111F PR DISCHARGE MEDS RECONCILED W/ CURRENT OUTPATIENT MED LIST: ICD-10-PCS | Mod: CPTII,S$GLB,, | Performed by: NURSE PRACTITIONER

## 2023-01-25 PROCEDURE — 1111F DSCHRG MED/CURRENT MED MERGE: CPT | Mod: CPTII,S$GLB,, | Performed by: NURSE PRACTITIONER

## 2023-01-25 PROCEDURE — 99499 UNLISTED E&M SERVICE: CPT | Mod: S$GLB,,, | Performed by: NURSE PRACTITIONER

## 2023-01-25 PROCEDURE — 99349 HOME/RES VST EST MOD MDM 40: CPT | Mod: 25,S$GLB,, | Performed by: NURSE PRACTITIONER

## 2023-01-25 PROCEDURE — 99349 PR HOME VISIT,ESTAB PATIENT,LEVEL III: ICD-10-PCS | Mod: 25,S$GLB,, | Performed by: NURSE PRACTITIONER

## 2023-01-25 PROCEDURE — 99499 RISK ADDL DX/OHS AUDIT: ICD-10-PCS | Mod: S$GLB,,, | Performed by: NURSE PRACTITIONER

## 2023-01-25 PROCEDURE — 99406 BEHAV CHNG SMOKING 3-10 MIN: CPT | Mod: S$GLB,,, | Performed by: NURSE PRACTITIONER

## 2023-01-25 RX ORDER — CEPHALEXIN 500 MG/1
500 CAPSULE ORAL EVERY 8 HOURS
Qty: 21 CAPSULE | Refills: 0 | Status: ON HOLD | OUTPATIENT
Start: 2023-01-25 | End: 2023-01-29 | Stop reason: HOSPADM

## 2023-01-25 RX ORDER — FOLIC ACID 1 MG/1
1 TABLET ORAL DAILY
Qty: 30 TABLET | Refills: 5 | Status: SHIPPED | OUTPATIENT
Start: 2023-01-25 | End: 2024-03-14

## 2023-01-25 RX ORDER — EZETIMIBE 10 MG/1
10 TABLET ORAL DAILY
Qty: 30 TABLET | Refills: 8 | Status: SHIPPED | OUTPATIENT
Start: 2023-01-25 | End: 2023-03-01

## 2023-01-26 ENCOUNTER — TELEPHONE (OUTPATIENT)
Dept: ADMINISTRATIVE | Facility: CLINIC | Age: 68
End: 2023-01-26
Payer: MEDICARE

## 2023-01-26 ENCOUNTER — HOSPITAL ENCOUNTER (OUTPATIENT)
Facility: OTHER | Age: 68
Discharge: HOME | End: 2023-01-29
Attending: EMERGENCY MEDICINE | Admitting: HOSPITALIST
Payer: MEDICARE

## 2023-01-26 DIAGNOSIS — M06.9 RHEUMATOID ARTHRITIS FLARE: ICD-10-CM

## 2023-01-26 DIAGNOSIS — L03.90 CELLULITIS, UNSPECIFIED CELLULITIS SITE: ICD-10-CM

## 2023-01-26 DIAGNOSIS — L73.9 FOLLICULITIS: ICD-10-CM

## 2023-01-26 DIAGNOSIS — L03.314 CELLULITIS OF GROIN: Primary | ICD-10-CM

## 2023-01-26 DIAGNOSIS — E11.65 TYPE 2 DIABETES MELLITUS WITH HYPERGLYCEMIA, WITH LONG-TERM CURRENT USE OF INSULIN: ICD-10-CM

## 2023-01-26 DIAGNOSIS — M05.79 RHEUMATOID ARTHRITIS INVOLVING MULTIPLE SITES WITH POSITIVE RHEUMATOID FACTOR: ICD-10-CM

## 2023-01-26 DIAGNOSIS — Z79.4 TYPE 2 DIABETES MELLITUS WITH HYPERGLYCEMIA, WITH LONG-TERM CURRENT USE OF INSULIN: ICD-10-CM

## 2023-01-26 LAB
ALBUMIN SERPL BCP-MCNC: 2.8 G/DL (ref 3.5–5.2)
ALP SERPL-CCNC: 90 U/L (ref 55–135)
ALT SERPL W/O P-5'-P-CCNC: 16 U/L (ref 10–44)
ANION GAP SERPL CALC-SCNC: 11 MMOL/L (ref 8–16)
AST SERPL-CCNC: 15 U/L (ref 10–40)
BASOPHILS # BLD AUTO: 0.05 K/UL (ref 0–0.2)
BASOPHILS NFR BLD: 0.6 % (ref 0–1.9)
BILIRUB SERPL-MCNC: 0.4 MG/DL (ref 0.1–1)
BUN SERPL-MCNC: 9 MG/DL (ref 8–23)
CALCIUM SERPL-MCNC: 9.3 MG/DL (ref 8.7–10.5)
CHLORIDE SERPL-SCNC: 97 MMOL/L (ref 95–110)
CO2 SERPL-SCNC: 25 MMOL/L (ref 23–29)
CREAT SERPL-MCNC: 0.8 MG/DL (ref 0.5–1.4)
DIFFERENTIAL METHOD: ABNORMAL
EOSINOPHIL # BLD AUTO: 0.1 K/UL (ref 0–0.5)
EOSINOPHIL NFR BLD: 0.8 % (ref 0–8)
ERYTHROCYTE [DISTWIDTH] IN BLOOD BY AUTOMATED COUNT: 11.9 % (ref 11.5–14.5)
EST. GFR  (NO RACE VARIABLE): >60 ML/MIN/1.73 M^2
GLUCOSE SERPL-MCNC: 229 MG/DL (ref 70–110)
HCT VFR BLD AUTO: 38.4 % (ref 40–54)
HGB BLD-MCNC: 12.8 G/DL (ref 14–18)
IMM GRANULOCYTES # BLD AUTO: 0.03 K/UL (ref 0–0.04)
IMM GRANULOCYTES NFR BLD AUTO: 0.4 % (ref 0–0.5)
LYMPHOCYTES # BLD AUTO: 1.4 K/UL (ref 1–4.8)
LYMPHOCYTES NFR BLD: 17.1 % (ref 18–48)
MCH RBC QN AUTO: 31.7 PG (ref 27–31)
MCHC RBC AUTO-ENTMCNC: 33.3 G/DL (ref 32–36)
MCV RBC AUTO: 95 FL (ref 82–98)
MONOCYTES # BLD AUTO: 0.6 K/UL (ref 0.3–1)
MONOCYTES NFR BLD: 8 % (ref 4–15)
NEUTROPHILS # BLD AUTO: 5.9 K/UL (ref 1.8–7.7)
NEUTROPHILS NFR BLD: 73.1 % (ref 38–73)
NRBC BLD-RTO: 0 /100 WBC
PLATELET # BLD AUTO: 290 K/UL (ref 150–450)
PMV BLD AUTO: 10.1 FL (ref 9.2–12.9)
POTASSIUM SERPL-SCNC: 4.8 MMOL/L (ref 3.5–5.1)
PROT SERPL-MCNC: 7 G/DL (ref 6–8.4)
RBC # BLD AUTO: 4.04 M/UL (ref 4.6–6.2)
SODIUM SERPL-SCNC: 133 MMOL/L (ref 136–145)
WBC # BLD AUTO: 8 K/UL (ref 3.9–12.7)

## 2023-01-26 PROCEDURE — 63600175 PHARM REV CODE 636 W HCPCS: Performed by: EMERGENCY MEDICINE

## 2023-01-26 PROCEDURE — 99285 EMERGENCY DEPT VISIT HI MDM: CPT | Mod: 25

## 2023-01-26 PROCEDURE — 94761 N-INVAS EAR/PLS OXIMETRY MLT: CPT

## 2023-01-26 PROCEDURE — 80053 COMPREHEN METABOLIC PANEL: CPT | Mod: 91 | Performed by: PHYSICIAN ASSISTANT

## 2023-01-26 PROCEDURE — 85025 COMPLETE CBC W/AUTO DIFF WBC: CPT | Mod: 91 | Performed by: PHYSICIAN ASSISTANT

## 2023-01-26 PROCEDURE — 96374 THER/PROPH/DIAG INJ IV PUSH: CPT | Mod: 59

## 2023-01-26 PROCEDURE — 87040 BLOOD CULTURE FOR BACTERIA: CPT | Mod: 59 | Performed by: EMERGENCY MEDICINE

## 2023-01-26 PROCEDURE — 81003 URINALYSIS AUTO W/O SCOPE: CPT | Performed by: PHYSICIAN ASSISTANT

## 2023-01-26 PROCEDURE — 25500020 PHARM REV CODE 255: Performed by: EMERGENCY MEDICINE

## 2023-01-26 PROCEDURE — G0378 HOSPITAL OBSERVATION PER HR: HCPCS

## 2023-01-26 RX ORDER — HYDROMORPHONE HYDROCHLORIDE 1 MG/ML
0.5 INJECTION, SOLUTION INTRAMUSCULAR; INTRAVENOUS; SUBCUTANEOUS
Status: COMPLETED | OUTPATIENT
Start: 2023-01-26 | End: 2023-01-26

## 2023-01-26 RX ADMIN — IOHEXOL 100 ML: 350 INJECTION, SOLUTION INTRAVENOUS at 10:01

## 2023-01-26 RX ADMIN — HYDROMORPHONE HYDROCHLORIDE 0.5 MG: 1 INJECTION, SOLUTION INTRAMUSCULAR; INTRAVENOUS; SUBCUTANEOUS at 09:01

## 2023-01-26 NOTE — TELEPHONE ENCOUNTER
Returned call to Gris with home health. Patient is complaining of scrotal pain and arthritic pain. Currently taking oxycodone 20 mg PO q4h as previously prescribed. Called pt to discuss. States he cannot get through the weekend without pain medication and cannot refill until Saturday. States he has an old prescription of hydrocodone that he intends to use. Has f/u with Rheumatologist on Monday. Cautioned not to take oxycodone in conjunction with hydrocodone or alcohol. Advised to reach out to his urologist if his post-surgical pain is not adequately controlled. Will copy his providers on this encounter.

## 2023-01-27 VITALS
SYSTOLIC BLOOD PRESSURE: 110 MMHG | DIASTOLIC BLOOD PRESSURE: 62 MMHG | HEART RATE: 94 BPM | OXYGEN SATURATION: 99 % | RESPIRATION RATE: 16 BRPM

## 2023-01-27 PROBLEM — M05.79 RHEUMATOID ARTHRITIS INVOLVING MULTIPLE SITES WITH POSITIVE RHEUMATOID FACTOR: Status: ACTIVE | Noted: 2023-01-27

## 2023-01-27 PROBLEM — L73.9 FOLLICULITIS: Status: ACTIVE | Noted: 2023-01-27

## 2023-01-27 PROBLEM — L03.314 CELLULITIS OF GROIN: Status: ACTIVE | Noted: 2023-01-27

## 2023-01-27 PROBLEM — N49.3 FOURNIER'S GANGRENE IN MALE: Status: RESOLVED | Noted: 2022-11-15 | Resolved: 2023-01-27

## 2023-01-27 PROBLEM — M06.9 RHEUMATOID ARTHRITIS: Status: ACTIVE | Noted: 2023-01-27

## 2023-01-27 LAB
ANION GAP SERPL CALC-SCNC: 9 MMOL/L (ref 8–16)
BASOPHILS # BLD AUTO: 0.04 K/UL (ref 0–0.2)
BASOPHILS NFR BLD: 0.5 % (ref 0–1.9)
BILIRUB UR QL STRIP: NEGATIVE
BUN SERPL-MCNC: 8 MG/DL (ref 8–23)
CALCIUM SERPL-MCNC: 9 MG/DL (ref 8.7–10.5)
CHLORIDE SERPL-SCNC: 98 MMOL/L (ref 95–110)
CLARITY UR: CLEAR
CO2 SERPL-SCNC: 27 MMOL/L (ref 23–29)
COLOR UR: YELLOW
CREAT SERPL-MCNC: 0.7 MG/DL (ref 0.5–1.4)
DIFFERENTIAL METHOD: ABNORMAL
EOSINOPHIL # BLD AUTO: 0.2 K/UL (ref 0–0.5)
EOSINOPHIL NFR BLD: 2.2 % (ref 0–8)
ERYTHROCYTE [DISTWIDTH] IN BLOOD BY AUTOMATED COUNT: 11.9 % (ref 11.5–14.5)
EST. GFR  (NO RACE VARIABLE): >60 ML/MIN/1.73 M^2
GLUCOSE SERPL-MCNC: 178 MG/DL (ref 70–110)
GLUCOSE UR QL STRIP: NEGATIVE
HCT VFR BLD AUTO: 35.6 % (ref 40–54)
HGB BLD-MCNC: 11.9 G/DL (ref 14–18)
HGB UR QL STRIP: NEGATIVE
IMM GRANULOCYTES # BLD AUTO: 0.04 K/UL (ref 0–0.04)
IMM GRANULOCYTES NFR BLD AUTO: 0.5 % (ref 0–0.5)
KETONES UR QL STRIP: ABNORMAL
LEUKOCYTE ESTERASE UR QL STRIP: NEGATIVE
LYMPHOCYTES # BLD AUTO: 1.7 K/UL (ref 1–4.8)
LYMPHOCYTES NFR BLD: 21.9 % (ref 18–48)
MAGNESIUM SERPL-MCNC: 2 MG/DL (ref 1.6–2.6)
MCH RBC QN AUTO: 31.6 PG (ref 27–31)
MCHC RBC AUTO-ENTMCNC: 33.4 G/DL (ref 32–36)
MCV RBC AUTO: 95 FL (ref 82–98)
MONOCYTES # BLD AUTO: 0.9 K/UL (ref 0.3–1)
MONOCYTES NFR BLD: 11.3 % (ref 4–15)
NEUTROPHILS # BLD AUTO: 4.8 K/UL (ref 1.8–7.7)
NEUTROPHILS NFR BLD: 63.6 % (ref 38–73)
NITRITE UR QL STRIP: NEGATIVE
NRBC BLD-RTO: 0 /100 WBC
PH UR STRIP: 7 [PH] (ref 5–8)
PLATELET # BLD AUTO: 274 K/UL (ref 150–450)
PMV BLD AUTO: 11.3 FL (ref 9.2–12.9)
POCT GLUCOSE: 113 MG/DL (ref 70–110)
POCT GLUCOSE: 161 MG/DL (ref 70–110)
POCT GLUCOSE: 166 MG/DL (ref 70–110)
POCT GLUCOSE: 173 MG/DL (ref 70–110)
POCT GLUCOSE: 182 MG/DL (ref 70–110)
POTASSIUM SERPL-SCNC: 4 MMOL/L (ref 3.5–5.1)
PROT UR QL STRIP: NEGATIVE
RBC # BLD AUTO: 3.76 M/UL (ref 4.6–6.2)
SARS-COV-2 RDRP RESP QL NAA+PROBE: NEGATIVE
SODIUM SERPL-SCNC: 134 MMOL/L (ref 136–145)
SP GR UR STRIP: 1.03 (ref 1–1.03)
URN SPEC COLLECT METH UR: ABNORMAL
UROBILINOGEN UR STRIP-ACNC: NEGATIVE EU/DL
WBC # BLD AUTO: 7.61 K/UL (ref 3.9–12.7)

## 2023-01-27 PROCEDURE — 63600175 PHARM REV CODE 636 W HCPCS: Performed by: HOSPITALIST

## 2023-01-27 PROCEDURE — 96376 TX/PRO/DX INJ SAME DRUG ADON: CPT

## 2023-01-27 PROCEDURE — 83735 ASSAY OF MAGNESIUM: CPT | Performed by: PHYSICIAN ASSISTANT

## 2023-01-27 PROCEDURE — 99222 PR INITIAL HOSPITAL CARE,LEVL II: ICD-10-PCS | Mod: ,,, | Performed by: UROLOGY

## 2023-01-27 PROCEDURE — 96366 THER/PROPH/DIAG IV INF ADDON: CPT

## 2023-01-27 PROCEDURE — A4216 STERILE WATER/SALINE, 10 ML: HCPCS | Performed by: PHYSICIAN ASSISTANT

## 2023-01-27 PROCEDURE — 96365 THER/PROPH/DIAG IV INF INIT: CPT

## 2023-01-27 PROCEDURE — U0002 COVID-19 LAB TEST NON-CDC: HCPCS | Performed by: PHYSICIAN ASSISTANT

## 2023-01-27 PROCEDURE — 36415 COLL VENOUS BLD VENIPUNCTURE: CPT | Performed by: PHYSICIAN ASSISTANT

## 2023-01-27 PROCEDURE — 96375 TX/PRO/DX INJ NEW DRUG ADDON: CPT

## 2023-01-27 PROCEDURE — 99222 1ST HOSP IP/OBS MODERATE 55: CPT | Mod: ,,, | Performed by: UROLOGY

## 2023-01-27 PROCEDURE — G0378 HOSPITAL OBSERVATION PER HR: HCPCS

## 2023-01-27 PROCEDURE — 99223 PR INITIAL HOSPITAL CARE,LEVL III: ICD-10-PCS | Mod: AI,,, | Performed by: HOSPITALIST

## 2023-01-27 PROCEDURE — 63600175 PHARM REV CODE 636 W HCPCS: Performed by: PHYSICIAN ASSISTANT

## 2023-01-27 PROCEDURE — 96372 THER/PROPH/DIAG INJ SC/IM: CPT | Mod: 59 | Performed by: PHYSICIAN ASSISTANT

## 2023-01-27 PROCEDURE — 94761 N-INVAS EAR/PLS OXIMETRY MLT: CPT

## 2023-01-27 PROCEDURE — S0030 INJECTION, METRONIDAZOLE: HCPCS | Performed by: PHYSICIAN ASSISTANT

## 2023-01-27 PROCEDURE — 99223 1ST HOSP IP/OBS HIGH 75: CPT | Mod: AI,,, | Performed by: HOSPITALIST

## 2023-01-27 PROCEDURE — 25000003 PHARM REV CODE 250: Performed by: PHYSICIAN ASSISTANT

## 2023-01-27 PROCEDURE — 80048 BASIC METABOLIC PNL TOTAL CA: CPT | Performed by: PHYSICIAN ASSISTANT

## 2023-01-27 PROCEDURE — 85025 COMPLETE CBC W/AUTO DIFF WBC: CPT | Performed by: PHYSICIAN ASSISTANT

## 2023-01-27 PROCEDURE — 96367 TX/PROPH/DG ADDL SEQ IV INF: CPT

## 2023-01-27 RX ORDER — ACETAMINOPHEN 325 MG/1
650 TABLET ORAL EVERY 6 HOURS PRN
Status: DISCONTINUED | OUTPATIENT
Start: 2023-01-27 | End: 2023-01-29 | Stop reason: HOSPADM

## 2023-01-27 RX ORDER — AMOXICILLIN 250 MG
1 CAPSULE ORAL 2 TIMES DAILY PRN
Status: DISCONTINUED | OUTPATIENT
Start: 2023-01-27 | End: 2023-01-29 | Stop reason: HOSPADM

## 2023-01-27 RX ORDER — METRONIDAZOLE 500 MG/100ML
500 INJECTION, SOLUTION INTRAVENOUS
Status: DISCONTINUED | OUTPATIENT
Start: 2023-01-27 | End: 2023-01-28

## 2023-01-27 RX ORDER — DIGOXIN 125 MCG
125 TABLET ORAL DAILY
Status: DISCONTINUED | OUTPATIENT
Start: 2023-01-27 | End: 2023-01-29 | Stop reason: HOSPADM

## 2023-01-27 RX ORDER — GLUCAGON 1 MG
1 KIT INJECTION
Status: DISCONTINUED | OUTPATIENT
Start: 2023-01-27 | End: 2023-01-29 | Stop reason: HOSPADM

## 2023-01-27 RX ORDER — LAMIVUDINE 150 MG/1
150 TABLET, FILM COATED ORAL DAILY
Status: DISCONTINUED | OUTPATIENT
Start: 2023-01-27 | End: 2023-01-29 | Stop reason: HOSPADM

## 2023-01-27 RX ORDER — MORPHINE SULFATE 15 MG/1
15 TABLET ORAL EVERY 4 HOURS PRN
Status: DISCONTINUED | OUTPATIENT
Start: 2023-01-27 | End: 2023-01-29 | Stop reason: HOSPADM

## 2023-01-27 RX ORDER — NALOXONE HCL 0.4 MG/ML
0.02 VIAL (ML) INJECTION
Status: DISCONTINUED | OUTPATIENT
Start: 2023-01-27 | End: 2023-01-29 | Stop reason: HOSPADM

## 2023-01-27 RX ORDER — IBUPROFEN 200 MG
24 TABLET ORAL
Status: DISCONTINUED | OUTPATIENT
Start: 2023-01-27 | End: 2023-01-29 | Stop reason: HOSPADM

## 2023-01-27 RX ORDER — METHYLPREDNISOLONE SOD SUCC 125 MG
125 VIAL (EA) INJECTION
Status: DISCONTINUED | OUTPATIENT
Start: 2023-01-27 | End: 2023-01-29 | Stop reason: HOSPADM

## 2023-01-27 RX ORDER — SPIRONOLACTONE 25 MG/1
25 TABLET ORAL DAILY
Status: DISCONTINUED | OUTPATIENT
Start: 2023-01-27 | End: 2023-01-29 | Stop reason: HOSPADM

## 2023-01-27 RX ORDER — HEPARIN SODIUM 5000 [USP'U]/ML
5000 INJECTION, SOLUTION INTRAVENOUS; SUBCUTANEOUS EVERY 8 HOURS
Status: DISCONTINUED | OUTPATIENT
Start: 2023-01-27 | End: 2023-01-29 | Stop reason: HOSPADM

## 2023-01-27 RX ORDER — PREDNISONE 5 MG/1
5 TABLET ORAL DAILY
Status: DISCONTINUED | OUTPATIENT
Start: 2023-01-27 | End: 2023-01-27

## 2023-01-27 RX ORDER — OXYCODONE HYDROCHLORIDE 5 MG/1
20 TABLET ORAL EVERY 4 HOURS PRN
Status: DISCONTINUED | OUTPATIENT
Start: 2023-01-27 | End: 2023-01-29 | Stop reason: HOSPADM

## 2023-01-27 RX ORDER — CEFEPIME HYDROCHLORIDE 1 G/50ML
2 INJECTION, SOLUTION INTRAVENOUS
Status: DISCONTINUED | OUTPATIENT
Start: 2023-01-27 | End: 2023-01-29 | Stop reason: HOSPADM

## 2023-01-27 RX ORDER — INSULIN ASPART 100 [IU]/ML
10 INJECTION, SOLUTION INTRAVENOUS; SUBCUTANEOUS
Status: DISCONTINUED | OUTPATIENT
Start: 2023-01-27 | End: 2023-01-28

## 2023-01-27 RX ORDER — NAPROXEN SODIUM 220 MG/1
81 TABLET, FILM COATED ORAL DAILY
Status: DISCONTINUED | OUTPATIENT
Start: 2023-01-27 | End: 2023-01-29 | Stop reason: HOSPADM

## 2023-01-27 RX ORDER — TALC
6 POWDER (GRAM) TOPICAL NIGHTLY PRN
Status: DISCONTINUED | OUTPATIENT
Start: 2023-01-27 | End: 2023-01-29 | Stop reason: HOSPADM

## 2023-01-27 RX ORDER — METOPROLOL TARTRATE 50 MG/1
50 TABLET ORAL EVERY 12 HOURS
Status: DISCONTINUED | OUTPATIENT
Start: 2023-01-27 | End: 2023-01-29 | Stop reason: HOSPADM

## 2023-01-27 RX ORDER — INSULIN ASPART 100 [IU]/ML
1-10 INJECTION, SOLUTION INTRAVENOUS; SUBCUTANEOUS
Status: DISCONTINUED | OUTPATIENT
Start: 2023-01-27 | End: 2023-01-29 | Stop reason: HOSPADM

## 2023-01-27 RX ORDER — FOLIC ACID 1 MG/1
1 TABLET ORAL DAILY
Status: DISCONTINUED | OUTPATIENT
Start: 2023-01-27 | End: 2023-01-29 | Stop reason: HOSPADM

## 2023-01-27 RX ORDER — SODIUM CHLORIDE 0.9 % (FLUSH) 0.9 %
10 SYRINGE (ML) INJECTION EVERY 8 HOURS
Status: DISCONTINUED | OUTPATIENT
Start: 2023-01-27 | End: 2023-01-29 | Stop reason: HOSPADM

## 2023-01-27 RX ORDER — IBUPROFEN 200 MG
16 TABLET ORAL
Status: DISCONTINUED | OUTPATIENT
Start: 2023-01-27 | End: 2023-01-29 | Stop reason: HOSPADM

## 2023-01-27 RX ADMIN — INSULIN ASPART 10 UNITS: 100 INJECTION, SOLUTION INTRAVENOUS; SUBCUTANEOUS at 08:01

## 2023-01-27 RX ADMIN — THERA TABS 1 TABLET: TAB at 08:01

## 2023-01-27 RX ADMIN — INSULIN DETEMIR 35 UNITS: 100 INJECTION, SOLUTION SUBCUTANEOUS at 09:01

## 2023-01-27 RX ADMIN — FOLIC ACID 1 MG: 1 TABLET ORAL at 08:01

## 2023-01-27 RX ADMIN — METRONIDAZOLE 500 MG: 5 INJECTION, SOLUTION INTRAVENOUS at 07:01

## 2023-01-27 RX ADMIN — MORPHINE SULFATE 15 MG: 15 TABLET ORAL at 09:01

## 2023-01-27 RX ADMIN — OXYCODONE HYDROCHLORIDE 20 MG: 5 TABLET ORAL at 12:01

## 2023-01-27 RX ADMIN — CEFEPIME 2 G: 2 INJECTION, POWDER, FOR SOLUTION INTRAVENOUS at 09:01

## 2023-01-27 RX ADMIN — HEPARIN SODIUM 5000 UNITS: 5000 INJECTION INTRAVENOUS; SUBCUTANEOUS at 02:01

## 2023-01-27 RX ADMIN — INSULIN ASPART 10 UNITS: 100 INJECTION, SOLUTION INTRAVENOUS; SUBCUTANEOUS at 05:01

## 2023-01-27 RX ADMIN — ASPIRIN 81 MG CHEWABLE TABLET 81 MG: 81 TABLET CHEWABLE at 08:01

## 2023-01-27 RX ADMIN — METOPROLOL TARTRATE 50 MG: 50 TABLET, FILM COATED ORAL at 09:01

## 2023-01-27 RX ADMIN — HEPARIN SODIUM 5000 UNITS: 5000 INJECTION INTRAVENOUS; SUBCUTANEOUS at 09:01

## 2023-01-27 RX ADMIN — MORPHINE SULFATE 15 MG: 15 TABLET ORAL at 11:01

## 2023-01-27 RX ADMIN — MORPHINE SULFATE 15 MG: 15 TABLET ORAL at 05:01

## 2023-01-27 RX ADMIN — DIGOXIN 125 MCG: 125 TABLET ORAL at 08:01

## 2023-01-27 RX ADMIN — METHYLPREDNISOLONE SODIUM SUCCINATE 125 MG: 125 INJECTION, POWDER, FOR SOLUTION INTRAMUSCULAR; INTRAVENOUS at 02:01

## 2023-01-27 RX ADMIN — METRONIDAZOLE 500 MG: 5 INJECTION, SOLUTION INTRAVENOUS at 09:01

## 2023-01-27 RX ADMIN — LAMIVUDINE 150 MG: 150 TABLET, FILM COATED ORAL at 08:01

## 2023-01-27 RX ADMIN — OXYCODONE HYDROCHLORIDE 20 MG: 5 TABLET ORAL at 01:01

## 2023-01-27 RX ADMIN — OXYCODONE HYDROCHLORIDE 20 MG: 5 TABLET ORAL at 06:01

## 2023-01-27 RX ADMIN — SPIRONOLACTONE 25 MG: 25 TABLET, FILM COATED ORAL at 08:01

## 2023-01-27 RX ADMIN — CEFEPIME 2 G: 2 INJECTION, POWDER, FOR SOLUTION INTRAVENOUS at 02:01

## 2023-01-27 RX ADMIN — CEFEPIME 2 G: 2 INJECTION, POWDER, FOR SOLUTION INTRAVENOUS at 05:01

## 2023-01-27 RX ADMIN — METRONIDAZOLE 500 MG: 5 INJECTION, SOLUTION INTRAVENOUS at 12:01

## 2023-01-27 RX ADMIN — Medication 10 ML: at 05:01

## 2023-01-27 RX ADMIN — HEPARIN SODIUM 5000 UNITS: 5000 INJECTION INTRAVENOUS; SUBCUTANEOUS at 05:01

## 2023-01-27 RX ADMIN — PREDNISONE 5 MG: 5 TABLET ORAL at 09:01

## 2023-01-27 RX ADMIN — METOPROLOL TARTRATE 50 MG: 50 TABLET, FILM COATED ORAL at 08:01

## 2023-01-27 RX ADMIN — Medication 10 ML: at 10:01

## 2023-01-27 NOTE — ASSESSMENT & PLAN NOTE
"- Patient presented with tenderness, erythema and a "cyst" that is fluctuant in the groin  - Evaluated by urology, noted to have some mild erythema/induration along the surgical wound and a small cyst along the inner thigh.  Nothing operative.  Diagnosed with mild folliculitis  - he was previously treated in this facility, and discharged to [facility] for management of Mary's Gangrene   - Patient was prescribed Keflex by urgent care for possible scrotal cellulitis, looks OK currently   - started on Cefepime and Metronidazole upon admission, will continue this for a day or two while he is being treated with steroids.    "

## 2023-01-27 NOTE — PATIENT INSTRUCTIONS
Instructions:  - [facility] Nurse Practitioner to schedule home follow-up visit with patient in 2-3 weeks or as needed.  - Continue all medications, treatments and therapies as ordered.   - Follow all instructions, recommendations as discussed.  - Maintain Safety Precautions at all times.  - Attend all medical appointments as scheduled.  - For worsening symptoms: call Primary Care Physician or Nurse Practitioner.  - For emergencies, call 911 or immediately report to the nearest emergency room.  - Limit Risks of environmental exposure to coronavirus/COVID-19 as discussed including: social distancing, hand hygiene, and limiting departures from the home for necessities only.

## 2023-01-27 NOTE — ASSESSMENT & PLAN NOTE
- last A1C:   Lab Results   Component Value Date    HGBA1C 7.6 (H) 11/15/2022   - hold oral antidiabetic meds   - Diabetic diet   - SSI with accuchecks AC/HS      Anti-hyperglycemic dose as follows-   Antihyperglycemics (From admission, onward)    Start     Stop Route Frequency Ordered    01/27/23 0900  insulin detemir U-100 pen 35 Units         -- SubQ 2 times daily 01/27/23 0053    01/27/23 0715  insulin aspart U-100 pen 10 Units         -- SubQ 3 times daily with meals 01/27/23 0053    01/27/23 0153  insulin aspart U-100 pen 1-10 Units         -- SubQ Before meals & nightly PRN 01/27/23 0053        Hold Oral hypoglycemics while patient is in the hospital.  - when questioned about his insulin doses he reported confusion but thinks he takes 40 units BID

## 2023-01-27 NOTE — FIRST PROVIDER EVALUATION
Emergency Department TeleTriage Encounter Note      CHIEF COMPLAINT    Chief Complaint   Patient presents with    Groin Pain     Post op for infection in groin area on 11/11. Pt was seen by MD today was told to come in for steroid treatment with monitoring. Pain in groin and joints 9/10       VITAL SIGNS   Initial Vitals [01/26/23 1947]   BP Pulse Resp Temp SpO2   119/66 (!) 125 18 97.9 °F (36.6 °C) 95 %      MAP       --            ALLERGIES    Review of patient's allergies indicates:   Allergen Reactions    Enbrel [etanercept] Shortness Of Breath     CHF    Nsaids (non-steroidal anti-inflammatory drug) Other (See Comments)     hypertention  Other reaction(s): Other (See Comments)  hypertention  HTN    Statins-hmg-coa reductase inhibitors Other (See Comments)     Heart arrythemias  Other reaction(s): Other (See Comments)  Heart arrythemias  Joint pain and cardiac arrythmias    Pcn [penicillins] Rash       PROVIDER TRIAGE NOTE  67-year-old male referred to the ER for initiation of steroid treatment for rheumatoid arthritis flare with monitoring.  Rheumatology concerned about recurrence of possible Mary's gangrene which the patient had a few months prior.  Recommended admission to the hospital for treatment of a RA flare and close clinical monitoring while on steroids.      ORDERS  Labs Reviewed   CBC W/ AUTO DIFFERENTIAL   COMPREHENSIVE METABOLIC PANEL   URINALYSIS, REFLEX TO URINE CULTURE       ED Orders (720h ago, onward)      Start Ordered     Status Ordering Provider    01/26/23 2022 01/26/23 2021  Saline lock IV  Once         Ordered MEGHANA GAYLE.    01/26/23 2022 01/26/23 2021  CBC Auto Differential  STAT         Ordered MEGHANA GAYLE.    01/26/23 2022 01/26/23 2021  Comprehensive Metabolic Panel  STAT         Ordered MEGHANA GAYLE.    01/26/23 2022 01/26/23 2021  Urinalysis, Reflex to Urine Culture  STAT         Ordered MEGHANA GAYLE              Virtual Visit Note: The provider triage  portion of this emergency department evaluation and documentation was performed via [employer], a HIPAA-compliant telemedicine application, in concert with a tele-presenter in the room. A face to face patient evaluation with one of my colleagues will occur once the patient is placed in an emergency department room.      DISCLAIMER: This note was prepared with [employer] voice recognition transcription software. Garbled syntax, mangled pronouns, and other bizarre constructions may be attributed to that software system.

## 2023-01-27 NOTE — ASSESSMENT & PLAN NOTE
"- chronic   - follows w/ Dr. Alcala   - per last Rheumatology note from 10/24/23:  "-continue  kevzara injections (Risks of TNF inhibitor discussed with patient and not limited to cell count abnormalities, malignancy,  GI perforation,allergic  reaction to medication, and increased risk of infection. Patient agrees with starting medication.)  -wean prednisone from 32. 5 mg a day to 30mg a day for one week then 25 mg a day for one week and then 20mg a day for one week and then come back so I can examine him.  I told him I am concerned he may be treating fibromyalgia with the steroids.  No MTX given MARI and baseline thrombocytopenia, poor lung reserve  Continue folic acid 1 mg po qday   -continue  lamivudine for Hep B core antibody  Follow up with GI  Continue bactrim MWF"  - has upcoming appointment 1/30/23  - per his Rheumatologist's note, he should currently be taking 20 mg prednisone daily and Bactrim DS    - after weaning to 20 mg prednisone daily he was to have a follow up appointment of which there is no record    - he reports he is taking not prednisone    - he denies Bactrim use   - PRN pain medications ordered   "

## 2023-01-27 NOTE — SUBJECTIVE & OBJECTIVE
Past Medical History:   Diagnosis Date    Arthritis     Atrial myxoma     CHF (congestive heart failure)     Coronary atherosclerosis     Diabetes mellitus, type 2     Difficult intubation     Hepatitis B     Hyperlipidemia     Hypertension     Non-alcoholic fatty liver disease     Rheumatoid arthritis     Rheumatoid arthritis flare 07/12/2021    Stroke     TIA    Systolic heart failure        Past Surgical History:   Procedure Laterality Date    CORONARY ANGIOGRAPHY N/A 3/10/2021    Procedure: ANGIOGRAM, CORONARY ARTERY - right radial;  Surgeon: Shemar Dempsey MD;  Location: [facility] CATH LAB;  Service: Cardiology;  Laterality: N/A;    CORONARY STENT PLACEMENT  03/10/2021    prox-mid RCA Purdys 4.5 x 26 mm, 4.5 x 12 mm    INCISION AND DRAINAGE OF SCROTUM N/A 11/15/2022    Procedure: INCISION AND DRAINAGE, SCROTUM;  Surgeon: William Hatch MD;  Location: [facility] OR;  Service: Urology;  Laterality: N/A;    INCISION AND DRAINAGE OF SCROTUM N/A 11/17/2022    Procedure: INCISION AND DRAINAGE, SCROTUM;  Surgeon: William Hatch MD;  Location: [facility] OR;  Service: Urology;  Laterality: N/A;    LUNG LOBECTOMY Right 2008    RUL lobectomy after removal of atrial myxoma    PLEURA BIOPSY      RESECTION OF ATRIAL MYXOMA  2007       Review of patient's allergies indicates:   Allergen Reactions    Enbrel [etanercept] Shortness Of Breath     CHF    Nsaids (non-steroidal anti-inflammatory drug) Other (See Comments)     hypertention  Other reaction(s): Other (See Comments)  hypertention  HTN    Statins-hmg-coa reductase inhibitors Other (See Comments)     Heart arrythemias  Other reaction(s): Other (See Comments)  Heart arrythemias  Joint pain and cardiac arrythmias    Pcn [penicillins] Rash       Family History       Problem Relation (Age of Onset)    Cancer Sister    Diabetes type I Father    Diabetes type II Mother    Hodgkin's lymphoma Mother    Kidney failure Father            Tobacco Use    Smoking status: Every Day      Packs/day: 1.00     Years: 35.00     Pack years: 35.00     Types: Cigarettes    Smokeless tobacco: Never   Substance and Sexual Activity    Alcohol use: Not Currently    Drug use: No    Sexual activity: Never       Review of Systems   Constitutional:  Positive for chills and fever. Negative for activity change, appetite change, fatigue and unexpected weight change.   HENT:  Negative for sore throat and trouble swallowing.    Eyes:  Negative for pain and discharge.   Respiratory:  Negative for chest tightness and shortness of breath.    Cardiovascular:  Negative for chest pain and palpitations.   Gastrointestinal:  Negative for abdominal pain, constipation, diarrhea, nausea and vomiting.   Genitourinary:  Positive for testicular pain. Negative for difficulty urinating, dysuria, flank pain, frequency, hematuria, penile discharge, penile pain, penile swelling, scrotal swelling and urgency.        As per HPI   Musculoskeletal:  Negative for back pain and gait problem.   Skin:  Positive for wound. Negative for rash.   Neurological:  Negative for seizures and numbness.   Psychiatric/Behavioral:  Negative for hallucinations. The patient is not nervous/anxious.      Objective:     Temp:  [97.9 °F (36.6 °C)-99.4 °F (37.4 °C)] 98 °F (36.7 °C)  Pulse:  [] 98  Resp:  [14-19] 18  SpO2:  [92 %-98 %] 93 %  BP: (118-153)/(61-77) 148/68     Body mass index is 29.3 kg/m².           Drains       None                   Physical Exam  Vitals and nursing note reviewed.   Constitutional:       Appearance: Normal appearance.   HENT:      Head: Atraumatic.      Nose: Nose normal.   Eyes:      Extraocular Movements: Extraocular movements intact.      Pupils: Pupils are equal, round, and reactive to light.   Cardiovascular:      Rate and Rhythm: Normal rate.   Pulmonary:      Effort: Pulmonary effort is normal.   Abdominal:      General: Abdomen is flat. There is no distension.      Tenderness: There is no abdominal tenderness. There is  no right CVA tenderness or left CVA tenderness.   Genitourinary:     Comments: Incision over left kiara-scrotum healed by secondary intention without wound opening or drainage. Mild erythema and induration along the scrotum. No areas of fluctuance, purulence, abscesses, or crepitus. Mild scrotal tenderness. Right cyst palpated along the inner thigh with minimal drainage of purulence, no crepitus or pockets of fluctuance palpated.   Musculoskeletal:         General: Normal range of motion.      Cervical back: Normal range of motion.   Skin:     Coloration: Skin is not jaundiced.   Neurological:      General: No focal deficit present.      Mental Status: He is alert and oriented to person, place, and time.   Psychiatric:         Mood and Affect: Mood normal.         Behavior: Behavior normal.       Significant Labs:    BMP:  Recent Labs   Lab 01/26/23 1608 01/26/23 2051 01/27/23  0343   * 133* 134*   K 4.2 4.8 4.0    97 98   CO2 25 25 27   BUN 9 9 8   CREATININE 0.9 0.8 0.7   CALCIUM 8.9 9.3 9.0       CBC:  Recent Labs   Lab 01/26/23 1608 01/26/23 2051 01/27/23  0343   WBC 8.09 8.00 7.61   HGB 11.9* 12.8* 11.9*   HCT 37.2* 38.4* 35.6*    290 274       All pertinent labs results from the past 24 hours have been reviewed.    Significant Imaging:  All pertinent imaging results/findings from the past 24 hours have been reviewed.

## 2023-01-27 NOTE — HPI
"From H&P by Marcia GONZALEZ:  "Mr. Nithin Wagner is a 67 y.o. male, with PMH of Mary's gangrene, T2DM, obesity, CAD, CHF, HTN, RA, PVD, who presented to [facility] ED on 1/26/23 due to concern for infection in the groin as he started with groin pain 1 week ago. He notes associated chills, fever, abdominal pain in the ED. Upon admission exam he focuses mainly on the pain in his joints, specifically his left wrist. He states that he had previously been treated for a groin infection, and had surgery of the scrotum for gangrene. Afterwards he was at [facility], and was discharged one week ago. He had a fall, after which his scrotum was tender, and then one week ago he noticed a cyst. His home health nurse visited him today, and kirk blood work. Afterwards he developed a fever. He was evaluated in the ED with labs showing no leukocytosis or left shift on CBC. A metabolic panel showed elevated glucose of 229 without evidence for DKA. A CT of the pelvis showed skin and soft tissue thickening of the perineum and scrotum with mild associated subcutaneous stranding or inflammatory change. He was treated in the ED with pain medication."  "

## 2023-01-27 NOTE — HPI
"Nithin Wagner is a 67M h/o DM2, CAD, HTN, HLD who presents with worsening scrotal and groin pain over the past week. 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 He was d/c'ed with HH/wound care as well as on IV ertapenem. He reports subjective fevers at home (unmeasured) as well as some purulent drainage from a "cyst" on his right inner thigh. At bedside, he is AF, tachy to 125 (now 98), Ua negative, Cr 0.7, and WBC 7.61. CT scan of his groin does not show areas of fluctuance for drainage or gas within the soft tissues, no apparent abscesses. The patient is currently on IV flagyl and cefepime.   "

## 2023-01-27 NOTE — CONSULTS
[facility] ([address])  Wound Care    Patient Name:  Nithin Wagner   MRN:  [id number]  Date: 1/27/2023  Diagnosis: Cellulitis of groin    History:     Past Medical History:   Diagnosis Date    Arthritis     Atrial myxoma     CHF (congestive heart failure)     Coronary atherosclerosis     Diabetes mellitus, type 2     Difficult intubation     Hepatitis B     Hyperlipidemia     Hypertension     Non-alcoholic fatty liver disease     Rheumatoid arthritis     Rheumatoid arthritis flare 07/12/2021    Stroke     TIA    Systolic heart failure        Social History     Socioeconomic History    Marital status: Single   Occupational History    Occupation: , respiratory therapist, founded [employer]     Comment: Retired   Tobacco Use    Smoking status: Every Day     Packs/day: 1.00     Years: 35.00     Pack years: 35.00     Types: Cigarettes    Smokeless tobacco: Never   Substance and Sexual Activity    Alcohol use: Not Currently    Drug use: No    Sexual activity: Never     Social Determinants of Health     Financial Resource Strain: High Risk    Difficulty of Paying Living Expenses: Hard   Food Insecurity: No Food Insecurity    Worried About Running Out of Food in the Last Year: Never true    Ran Out of Food in the Last Year: Never true   Transportation Needs: Unmet Transportation Needs    Lack of Transportation (Medical): Yes    Lack of Transportation (Non-Medical): Yes   Physical Activity: Inactive    Days of Exercise per Week: 0 days    Minutes of Exercise per Session: 0 min   Stress: Stress Concern Present    Feeling of Stress : To some extent   Social Connections: Socially Isolated    Frequency of Communication with Friends and Family: More than three times a week    Frequency of Social Gatherings with Friends and Family: More than three times a week    Attends Nondenominational Services: Never    Active Member of Clubs or Organizations: No    Attends Club or Organization Meetings: Never    Marital  Status: Never    Housing Stability: High Risk    Unable to Pay for Housing in the Last Year: Yes    Number of Places Lived in the Last Year: 1    Unstable Housing in the Last Year: No       Precautions:     Allergies as of 01/26/2023 - Reviewed 01/26/2023   Allergen Reaction Noted    Enbrel [etanercept] Shortness Of Breath 07/12/2021    Nsaids (non-steroidal anti-inflammatory drug) Other (See Comments) 06/29/2015    Statins-hmg-coa reductase inhibitors Other (See Comments) 06/29/2015    Pcn [penicillins] Rash 06/29/2015       WO Assessment Details/Treatment     Wound care consult received from RN for assessment of right forearm, left forearm and right foot. Patient is a 67 year old male known to wound care from previous admissions. Patient admitted for cellulitis of groin.     Upon assessment to right foot and left knee noted dried fibrinous full thickness wounds. Left and right arm with resolving scab abrasions. Nose with intact scabbed abrasions. Recommend triad ointment to full thickness wounds to left knee and right foot to promote autolytic debridement and moist wound healing.        01/27/23 1155        Altered Skin Integrity 01/27/23 0121 Right Foot #1 Full thickness tissue loss. Base is covered by slough and/or eschar in the wound bed   Date First Assessed/Time First Assessed: 01/27/23 0121   Altered Skin Integrity Present on Admission: yes  Side: Right  Location: Foot  Wound Number: #1  Is this injury device related?: No  Description of Altered Skin Integrity: Full thickness tissue ...   Wound Image    Dressing Appearance Open to air   Drainage Amount None   Drainage Characteristics/Odor No odor   Appearance Necrotic;Fibrin;Dry   Tissue loss description Full thickness   Periwound Area Intact;Dry   Wound Edges Undefined   Care Cleansed with:;Antimicrobial agent;Applied:;Skin Barrier  (Triad ointment)   Dressing Applied;Island/border        Altered Skin Integrity 01/26/23 2200 Left Knee #2 Abrasion(s)    Date First Assessed/Time First Assessed: 01/26/23 2200   Side: Left  Location: Knee  Wound Number: #2  Is this injury device related?: No  Primary Wound Type: Abrasion(s)   Wound Image    Description of Altered Skin Integrity Full thickness tissue loss. Subcutaneous fat may be visible but bone, tendon or muscle are not exposed   Dressing Appearance Open to air   Drainage Amount None   Drainage Characteristics/Odor No odor   Appearance Dry;Fibrin;Necrotic   Tissue loss description Full thickness   Periwound Area Intact;Dry   Wound Length (cm) 1.5 cm   Wound Width (cm) 3.3 cm   Wound Surface Area (cm^2) 4.95 cm^2   Care Cleansed with:;Antimicrobial agent;Applied:;Skin Barrier  (Triad ointment)   Dressing Island/border   Periwound Care Dry periwound area maintained             01/27/2023

## 2023-01-27 NOTE — ASSESSMENT & PLAN NOTE
-Surgical site to L scrotal area healing well. Incision closed, no drainage, no s/s of infection  -Pt has small 2cm indurated area to R groin area. Pt has not been taking any prescribed antibx since home, he states he does not know what he's supposed to be taking or if he ever got them.   -Re-sent previously ordered Keflex advised to  today, take as directed.   -Strict ER precautions given to pt if he starts to develop fever, site worsens, V.U..

## 2023-01-27 NOTE — ASSESSMENT & PLAN NOTE
Nithin Wagner is a 67 y.o. male with hx of Mary's gangrene who presents with worsening scrotal and groin pain.     - Consult d/w Staff Urologist  - Strict I's/O's  - Trend Cr, avoid nephrotoxic agents, and dose medications to patient's current GFR  - No indications for urologic interventions  - Recommend culture of purulence from cyst on right inner thigh   - Recommend wound care consult  - Continue on IV abx for now, recommend possible ID consult for d/c abx plan  - All remaining care per primary team

## 2023-01-27 NOTE — SUBJECTIVE & OBJECTIVE
Interval History: Patient is new to me.  He complains of severe pain in his left hand and can barely move.  Shoulder and hip also painful.  Very uncomfortable.  Groin is not really bothering him.    Review of Systems   Constitutional:  Negative for chills and fever.   Respiratory:  Negative for cough and shortness of breath.    Cardiovascular:  Negative for chest pain and palpitations.   Genitourinary:  Positive for scrotal swelling.   Musculoskeletal:  Positive for arthralgias and joint swelling.   Objective:     Vital Signs (Most Recent):  Temp: 99.1 °F (37.3 °C) (01/27/23 1610)  Pulse: 82 (01/27/23 1610)  Resp: 18 (01/27/23 1610)  BP: (!) 143/82 (01/27/23 1610)  SpO2: (!) 93 % (01/27/23 1610)   Vital Signs (24h Range):  Temp:  [97.9 °F (36.6 °C)-99.4 °F (37.4 °C)] 99.1 °F (37.3 °C)  Pulse:  [] 82  Resp:  [14-20] 18  SpO2:  [92 %-98 %] 93 %  BP: (118-153)/(61-82) 143/82     Weight: 95.3 kg (210 lb)  Body mass index is 29.3 kg/m².    Intake/Output Summary (Last 24 hours) at 1/27/2023 1644  Last data filed at 1/27/2023 0540  Gross per 24 hour   Intake 387.57 ml   Output --   Net 387.57 ml      Physical Exam  Constitutional:       Comments: Chronically ill-appearing.   Cardiovascular:      Rate and Rhythm: Normal rate and regular rhythm.      Pulses: Normal pulses.      Heart sounds: Normal heart sounds. No murmur heard.    No gallop.   Pulmonary:      Effort: Pulmonary effort is normal.      Breath sounds: Normal breath sounds.   Abdominal:      General: Bowel sounds are normal.      Palpations: Abdomen is soft.   Musculoskeletal:      Comments: Joint swelling noted left wrist/hand, decreased range of motion right shoulder, hip.   Skin:     General: Skin is warm and dry.      Comments: Multiple abrasions bridge of nose.   Neurological:      General: No focal deficit present.      Mental Status: He is alert and oriented to person, place, and time.       Significant Labs: All pertinent labs within the past 24  hours have been reviewed.    Significant Imaging: I have reviewed all pertinent imaging results/findings within the past 24 hours.

## 2023-01-27 NOTE — PROGRESS NOTES
[facility]  Medical Home Visit    Visit Date: 23  Encounter Provider: Giovanni Adkins  PCP:  Tushar Drew MD    Subjective:      Patient ID: Nithin Wagner is a 67 y.o. male.    Consult Requested By:  No ref. provider found  Reason for Consult:    Nithin is being seen at home due to being seen at home due to physical debility that presents a taxing effort to leave the home, to mitigate high risk of hospital readmission and/or due to the limited availability of reliable or safe options for transportation to the point of access to the provider. Prior to treatment on this visit the chart was reviewed and patient verbal consent was obtained.    Chief Complaint: Establish Care  Pt being seen today for f/u [place] [facility] for DM2, HTN, and HLD and for treatment of sepsis and debility secondary to Mary's gangrene of the L groin/scrotum.      TODAY: With this visit today patient is ambulatory with his cane and walker, living alone in his upstairs apartment. Patient is AAOx3, able to verify his name and . Pt states his nephew is here currently helping him and providing transportation but he is leaving over the weekend. Pt states he has no one else that can help. He does have a sister in [place] he can go stay with but then he would not be able to f/u with his doctors here in [facility].   Pt's room is very messy and disorganized. Pt has bags of multiple pill bottles, states some are current and old, he's not sure of all of it. I went over all currently prescribed meds with him, what he was missing I reordered. Pt has multiple bottles of antibx Bactrim, states he thinks he took all he was supposed to.   Pt states appetite is poor but improving, he is having meals brought to him by his nephew and neighbors. Pt has not been taking his normally prescribed hyperglycemic regimen. Has not been taking Tresiba or Trulicity. Gave himself 20U of Novolog yesterday bc his CBG was high. He has continuous  CBG monitoring, today is 180 at visit.   Pt denies any bowel/bladder problems. Takes Miralax and stool softener prn.  Pt is taking his oxycodone 10 as directed for pain, he is having break through pain.      Pt has [facility] working with him.     VSS. Denies fever, chest pain, shortness of breath, nausea, vomiting, diarrhea.  All hospital discharge orders reviewed and being followed, all medications reconciled and reviewed, patient  verbalized understanding. No other needs identified at this time.      Review of Systems   Constitutional:  Positive for activity change and appetite change. Negative for chills and fever.   Respiratory:  Negative for cough, chest tightness and shortness of breath.    Cardiovascular:  Positive for leg swelling. Negative for chest pain and palpitations.   Gastrointestinal:  Positive for constipation. Negative for abdominal pain, diarrhea, nausea and vomiting.   Genitourinary:  Negative for decreased urine volume, difficulty urinating and scrotal swelling.   Musculoskeletal:  Positive for arthralgias. Negative for gait problem and myalgias.   Skin:  Positive for wound. Negative for color change and rash.   Allergic/Immunologic: Negative for immunocompromised state.   Neurological:  Negative for dizziness, weakness and headaches.   Hematological:  Does not bruise/bleed easily.   Psychiatric/Behavioral:  Negative for confusion and sleep disturbance. The patient is nervous/anxious.      Assessments:  Environmental: dirty, cluttered  Functional Status: mos assist  Safety: high fall risk  Nutritional: adequate, appetite poor  Home Health/DME/Supplies: Rolator, cane, wound care supplies    Objective:   Physical Exam  Vitals reviewed.   Constitutional:       General: He is awake. He is not in acute distress.     Appearance: Normal appearance. He is overweight. He is not ill-appearing or toxic-appearing.   HENT:      Head: Atraumatic.   Cardiovascular:      Rate and Rhythm: Normal rate and  regular rhythm.      Heart sounds: Normal heart sounds.   Pulmonary:      Effort: Pulmonary effort is normal.      Breath sounds: Normal breath sounds and air entry. No decreased breath sounds or rales.   Abdominal:      General: Abdomen is flat. Bowel sounds are normal. There is no distension.      Palpations: Abdomen is soft.      Tenderness: There is no abdominal tenderness.   Genitourinary:     Testes:         Right: Mass (2cm abscess starting in groin area, fluctant, no area to I&D, no pus formation) present.         Left: Tenderness present. Mass or swelling not present.      Comments: See pic   Musculoskeletal:      Right lower le+ Edema present.      Left lower le+ Edema present.   Skin:     General: Skin is warm.      Capillary Refill: Capillary refill takes less than 2 seconds.      Findings: Abscess (R groin), erythema (bilateral groins) and wound present.   Neurological:      General: No focal deficit present.      Mental Status: He is alert and oriented to person, place, and time.      Cranial Nerves: Cranial nerves 2-12 are intact.   Psychiatric:         Attention and Perception: Attention normal.         Mood and Affect: Mood is anxious.         Speech: Speech normal.         Behavior: Behavior normal. Behavior is cooperative.         Cognition and Memory: Cognition and memory normal.           Vitals:    23 1335   BP: 110/62   Pulse: 94   Resp: 16   SpO2: 99%   PainSc:   6   PainLoc: Groin     There is no height or weight on file to calculate BMI.    Assessment:     1. Abrasions of multiple sites    2. 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 Trauma    5. Tobacco abuse        Plan:     Ethical / Legal: Advance Care Planning   Surrogate decision maker:  Name -self  Code Status:  FULL  LaPOST:  no  Other advance directive:  no, Capacity to make medical decisions: yes, Conflict-no       1. Abrasions of multiple sites  -     cephALEXin (KEFLEX) 500 MG capsule; Take 1  capsule (500 mg total) by mouth every 8 (eight) hours. for 7 days  Dispense: 21 capsule; Refill: 0  -Was previously ordered from UC, pt not taking d/t not having rx.     2. Mixed hyperlipidemia  -     ezetimibe (ZETIA) 10 mg tablet; Take 1 tablet (10 mg total) by mouth once daily.  Dispense: 30 tablet; Refill: 8    3. 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 Incision closed, no drainage, no s/s of infection  -Pt has small 2cm indurated area to R groin area. Pt has not been taking any prescribed antibx since home, he states he does not know what he's supposed to be taking or if he ever got them. Keflex sent, advised to place warm compresses TID. No improvement call PCP or myself, or ER if fever/chills, worsening of area.  -Re-sent previously ordered Keflex advised to  today, take as directed.   -Strict ER precautions given to pt if he starts to develop fever, site worsens, V.U..      4. Trauma  Assessment & Plan:  -Pt still with bilateral shoulder pain from fall while picking up his medications at [facility] pharmacy. R worse than L.  Pt did not complete shoulder xrays as ordered. All other xrays negative for fx.   -Advised pt to have xray performed, V.U..      5. Tobacco abuse   A consultation session <10 minutes was held with the patient discussing the impact on the patient's health and options for smoking cessation. Will continue to revisit with the patient.        Other orders  -     folic acid (FOLVITE) 1 MG tablet; Take 1 tablet (1 mg total) by mouth once daily.  Dispense: 30 tablet; Refill: 5  -     vortioxetine (TRINTELLIX) 10 mg Tab; Take 1 tablet (10 mg total) by mouth once daily. Take at 6am  Dispense: 30 tablet; Refill: 3         Total face-to-face time was 30 minutes, >50% of this was spent on counseling and coordination of care. The following issues were discussed: primary and secondary diagnoses, co-morbidities, prescribed medications, treatment  modalities, importance of compliance with medical advice, and directives for follow-up care.    Were controlled substances prescribed?  No    Home NP will f/u with pt in 1-2 wks. Information given to pt if questions, concerns or status changes to contact me.     Follow Up Appointments:   Future Appointments   Date Time Provider Department Center   1/30/2023  2:00 PM Korin Alcala MD [facility] RHEUM [facility]   2/1/2023  4:00 PM Sylvie Dc MD [facility] ID [facility]   3/1/2023  2:00 PM Shemar Dempsey MD [facility] [id number] [facility]   3/1/2023  2:30 PM Lalitha Stroud RN, CDE [facility] [id number] [facility]   4/18/2023  9:15 AM LAB, [facility] LABDRAW [facility]   4/18/2023  9:30 AM LAB, [facility] LABDRAW [facility]   4/26/2023  1:00 PM Kevin Lares MD [facility] [id number] [facility]       Signature: Giovanni Adkins NP

## 2023-01-27 NOTE — ASSESSMENT & PLAN NOTE
Chief complaint severe pain and decreased mobility of multiple joints, especially the left hand/wrist which is markedly swollen.  Has pain and stiffness in right should and hip as well.  Off chronic steroids for a few months since he was admitted (November) with Jose F.  Discussed with Dr. Alcala and IV steroids recommended starting with solu-medrol 125 mg bid.

## 2023-01-27 NOTE — ASSESSMENT & PLAN NOTE
- no clinical signs of volume overload   - home meds: furosemide 40 mg QD PRN, metoprolol tartrate 50 mg BID, spironolactone 25 mg QD   - monitor I&Os and daily weights   - last Echo 10/28/22 EF 55%   Summary   The left ventricle is normal in size with concentric hypertrophy and normal systolic function.   Grade I left ventricular diastolic dysfunction.   Normal right ventricular size with normal right ventricular systolic function.   Thickened and calcified posterior mitral valve leaflet.

## 2023-01-27 NOTE — ASSESSMENT & PLAN NOTE
Rheumatoid arthritis flare  - Followed by Dr. Alcala, patient did not tolerate Enbrel  - Kevzara injections planned, held for Mary's treatment.  - Patient weaned off steroids several months ago  - No MTX given MARI and baseline thrombocytopenia, poor lung reserve  - Continue folic acid 1 mg po qday   - Patient was on lamivudine for positive hepatitis B core Ab and is supposed to follow up with GI  Follow up with GI  - has upcoming appointment 1/30/23  - PRN pain medications ordered

## 2023-01-27 NOTE — CONSULTS
"[facility] ([address])  Urology  Consult Note    Patient Name: Nithin Wagner  MRN: [id number]  Admission Date: 1/26/2023  Hospital Length of Stay: 0   Code Status: Full Code   Attending Provider: Hetal Harper MD   Consulting Provider: Helio Jimenez MD  Primary Care Physician: Tushar Drew MD  Principal Problem:Cellulitis of groin    Inpatient consult to Urology  Consult performed by: Helio Jimenez MD  Consult ordered by: Marcia Vergara PA-C          Subjective:     HPI:  Nithin Wagner is a 67M h/o DM2, CAD, HTN, HLD who presents with worsening scrotal and groin pain over the past week. 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 He was d/c'ed with HH/wound care as well as on IV ertapenem. He reports subjective fevers at home (unmeasured) as well as some purulent drainage from a "cyst" on his right inner thigh. At bedside, he is AF, tachy to 125 (now 98), Ua negative, Cr 0.7, and WBC 7.61. CT scan of his groin does not show areas of fluctuance for drainage or gas within the soft tissues, no apparent abscesses. The patient is currently on IV flagyl and cefepime.       Past Medical History:   Diagnosis Date    Arthritis     Atrial myxoma     CHF (congestive heart failure)     Coronary atherosclerosis     Diabetes mellitus, type 2     Difficult intubation     Hepatitis B     Hyperlipidemia     Hypertension     Non-alcoholic fatty liver disease     Rheumatoid arthritis     Rheumatoid arthritis flare 07/12/2021    Stroke     TIA    Systolic heart failure        Past Surgical History:   Procedure Laterality Date    CORONARY ANGIOGRAPHY N/A 3/10/2021    Procedure: ANGIOGRAM, CORONARY ARTERY - right radial;  Surgeon: Shemar Dempsey MD;  Location: [facility] CATH LAB;  Service: Cardiology;  Laterality: N/A;    CORONARY STENT PLACEMENT  03/10/2021    prox-mid RCA [place] 4.5 x 26 mm, 4.5 x 12 mm    INCISION AND DRAINAGE " OF SCROTUM N/A 11/15/2022    Procedure: INCISION AND DRAINAGE, SCROTUM;  Surgeon: William Hatch MD;  Location: [facility] OR;  Service: Urology;  Laterality: N/A;    INCISION AND DRAINAGE OF SCROTUM N/A 11/17/2022    Procedure: INCISION AND DRAINAGE, SCROTUM;  Surgeon: William Hatch MD;  Location: [facility] OR;  Service: Urology;  Laterality: N/A;    LUNG LOBECTOMY Right 2008    RUL lobectomy after removal of atrial myxoma    PLEURA BIOPSY      RESECTION OF ATRIAL MYXOMA  2007       Review of patient's allergies indicates:   Allergen Reactions    Enbrel [etanercept] Shortness Of Breath     CHF    Nsaids (non-steroidal anti-inflammatory drug) Other (See Comments)     hypertention  Other reaction(s): Other (See Comments)  hypertention  HTN    Statins-hmg-coa reductase inhibitors Other (See Comments)     Heart arrythemias  Other reaction(s): Other (See Comments)  Heart arrythemias  Joint pain and cardiac arrythmias    Pcn [penicillins] Rash       Family History       Problem Relation (Age of Onset)    Cancer Sister    Diabetes type I Father    Diabetes type II Mother    Hodgkin's lymphoma Mother    Kidney failure Father            Tobacco Use    Smoking status: Every Day     Packs/day: 1.00     Years: 35.00     Pack years: 35.00     Types: Cigarettes    Smokeless tobacco: Never   Substance and Sexual Activity    Alcohol use: Not Currently    Drug use: No    Sexual activity: Never       Review of Systems   Constitutional:  Positive for chills and fever. Negative for activity change, appetite change, fatigue and unexpected weight change.   HENT:  Negative for sore throat and trouble swallowing.    Eyes:  Negative for pain and discharge.   Respiratory:  Negative for chest tightness and shortness of breath.    Cardiovascular:  Negative for chest pain and palpitations.   Gastrointestinal:  Negative for abdominal pain, constipation, diarrhea, nausea and vomiting.   Genitourinary:  Positive for testicular pain.  Negative for difficulty urinating, dysuria, flank pain, frequency, hematuria, penile discharge, penile pain, penile swelling, scrotal swelling and urgency.        As per HPI   Musculoskeletal:  Negative for back pain and gait problem.   Skin:  Positive for wound. Negative for rash.   Neurological:  Negative for seizures and numbness.   Psychiatric/Behavioral:  Negative for hallucinations. The patient is not nervous/anxious.      Objective:     Temp:  [97.9 °F (36.6 °C)-99.4 °F (37.4 °C)] 98 °F (36.7 °C)  Pulse:  [] 98  Resp:  [14-19] 18  SpO2:  [92 %-98 %] 93 %  BP: (118-153)/(61-77) 148/68     Body mass index is 29.3 kg/m².           Drains       None                   Physical Exam  Vitals and nursing note reviewed.   Constitutional:       Appearance: Normal appearance.   HENT:      Head: Atraumatic.      Nose: Nose normal.   Eyes:      Extraocular Movements: Extraocular movements intact.      Pupils: Pupils are equal, round, and reactive to light.   Cardiovascular:      Rate and Rhythm: Normal rate.   Pulmonary:      Effort: Pulmonary effort is normal.   Abdominal:      General: Abdomen is flat. There is no distension.      Tenderness: There is no abdominal tenderness. There is no right CVA tenderness or left CVA tenderness.   Genitourinary:     Comments: Incision over left kiara-scrotum healed by secondary intention without wound opening or drainage. Mild erythema and induration along the scrotum. No areas of fluctuance, purulence, abscesses, or crepitus. Mild scrotal tenderness. Right cyst palpated along the inner thigh with minimal drainage of purulence, no crepitus or pockets of fluctuance palpated.   Musculoskeletal:         General: Normal range of motion.      Cervical back: Normal range of motion.   Skin:     Coloration: Skin is not jaundiced.   Neurological:      General: No focal deficit present.      Mental Status: He is alert and oriented to person, place, and time.   Psychiatric:         Mood  and Affect: Mood normal.         Behavior: Behavior normal.       Significant Labs:    BMP:  Recent Labs   Lab 01/26/23  1608 01/26/23 2051 01/27/23  0343   * 133* 134*   K 4.2 4.8 4.0    97 98   CO2 25 25 27   BUN 9 9 8   CREATININE 0.9 0.8 0.7   CALCIUM 8.9 9.3 9.0       CBC:  Recent Labs   Lab 01/26/23  1608 01/26/23 2051 01/27/23  0343   WBC 8.09 8.00 7.61   HGB 11.9* 12.8* 11.9*   HCT 37.2* 38.4* 35.6*    290 274       All pertinent labs results from the past 24 hours have been reviewed.    Significant Imaging:  All pertinent imaging results/findings from the past 24 hours have been reviewed.                      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 male with hx of Mary's gangrene who presents with worsening scrotal and groin pain.     - Consult d/w Staff Urologist  - Strict I's/O's  - Trend Cr, avoid nephrotoxic agents, and dose medications to patient's current GFR  - No indications for urologic interventions  - Recommend culture of purulence from cyst on right inner thigh   - Recommend wound care consult  - Continue on IV abx for now, recommend possible ID consult for d/c abx plan  - All remaining care per primary team        VTE Risk Mitigation (From admission, onward)         Ordered     heparin (porcine) injection 5,000 Units  Every 8 hours         01/27/23 0032     IP VTE HIGH RISK PATIENT  Once         01/27/23 0032     Place sequential compression device  Until discontinued         01/27/23 0032                Thank you for your consult.    Helio Jimenez MD  Urology  [facility] ([address])

## 2023-01-27 NOTE — H&P
[facility]urg [address] Medicine  History & Physical    Patient Name: Nithin Wagner  MRN: [id number]  Patient Class: OP- Observation  Admission Date: 1/26/2023  Attending Physician: Hetal Harper MD   Primary Care Provider: Tushar Drew MD         Patient information was obtained from patient, past medical records and ER records.     Subjective:     Principal Problem:Cellulitis of groin    Chief Complaint:   Chief Complaint   Patient presents with    Groin Pain     Post op for infection in groin area on 11/11. Pt was seen by MD today was told to come in for steroid treatment with monitoring. Pain in groin and joints 9/10        HPI: Mr. Nithin Wagner is a 67 y.o. male, with PMH of Mary's gangrene, T2DM, obesity, CAD, CHF, HTN, RA, PVD, who presented to [facility] on 1/26/23 due to concern for infection in the groin as he started with groin pain 1 week ago. He notes associated chills, fever, abdominal pain in the ED. Upon admission exam he focuses mainly on the pain in his joints, specifically his left wrist. He states that he had previously been treated for a groin infection, and had surgery of the scrotum for gangrene. Afterwards he was at [facility], and was discharged one week ago. He had a fall, after which his scrotum was tender, and then one week ago he noticed a cyst. His home health nurse visited him today, and kirk blood work. Afterwards he developed a fever. He was evaluated in the ED with labs showing no leukocytosis or left shift on CBC. A metabolic panel showed elevated glucose of 229 without evidence for DKA. A CT of the pelvis showed skin and soft tissue thickening of the perineum and scrotum with mild associated subcutaneous stranding or inflammatory change. He was treated in the ED with pain medication. He was placed on OBS.       Past Medical History:   Diagnosis Date    Arthritis     Atrial myxoma     CHF (congestive heart failure)     Coronary atherosclerosis      Diabetes mellitus, type 2     Difficult intubation     Hepatitis B     Hyperlipidemia     Hypertension     Non-alcoholic fatty liver disease     Rheumatoid arthritis     Rheumatoid arthritis flare 07/12/2021    Stroke     TIA    Systolic heart failure        Past Surgical History:   Procedure Laterality Date    CORONARY ANGIOGRAPHY N/A 3/10/2021    Procedure: ANGIOGRAM, CORONARY ARTERY - right radial;  Surgeon: Shemar Dempsey MD;  Location: [facility] CATH LAB;  Service: Cardiology;  Laterality: N/A;    CORONARY STENT PLACEMENT  03/10/2021    prox-mid RCA [place] 4.5 x 26 mm, 4.5 x 12 mm    INCISION AND DRAINAGE OF SCROTUM N/A 11/15/2022    Procedure: INCISION AND DRAINAGE, SCROTUM;  Surgeon: William Hatch MD;  Location: [facility] OR;  Service: Urology;  Laterality: N/A;    INCISION AND DRAINAGE OF SCROTUM N/A 11/17/2022    Procedure: INCISION AND DRAINAGE, SCROTUM;  Surgeon: William Hatch MD;  Location: [facility] OR;  Service: Urology;  Laterality: N/A;    LUNG LOBECTOMY Right 2008    RUL lobectomy after removal of atrial myxoma    PLEURA BIOPSY      RESECTION OF ATRIAL MYXOMA  2007       Review of patient's allergies indicates:   Allergen Reactions    Enbrel [etanercept] Shortness Of Breath     CHF    Nsaids (non-steroidal anti-inflammatory drug) Other (See Comments)     hypertention  Other reaction(s): Other (See Comments)  hypertention  HTN    Statins-hmg-coa reductase inhibitors Other (See Comments)     Heart arrythemias  Other reaction(s): Other (See Comments)  Heart arrythemias  Joint pain and cardiac arrythmias    Pcn [penicillins] Rash       No current facility-administered medications on file prior to encounter.     Current Outpatient Medications on File Prior to Encounter   Medication Sig    NOVOLOG PENFILL U-100 INSULIN 100 unit/mL Crtg INJECT 14 UNITS INTO THE SKIN THREE TIMES A DAY WITH MEALS.    oxyCODONE (ROXICODONE) 20 mg Tab immediate release tablet Take 1 tablet (20 mg total) by  "mouth every 4 (four) hours as needed for Pain.    ascorbic acid, vitamin C, (VITAMIN C) 500 MG tablet Take 1 tablet (500 mg total) by mouth once daily.    aspirin 81 MG Chew Take 81 mg by mouth.    blood sugar diagnostic Strp To check BG 3 times daily, to use with insurance preferred meter    blood-glucose meter kit Use to check glucose as directed    cephALEXin (KEFLEX) 500 MG capsule Take 1 capsule (500 mg total) by mouth every 8 (eight) hours. for 7 days    digoxin (LANOXIN) 125 mcg tablet Take 1 tablet (125 mcg total) by mouth once daily.    ergocalciferol (ERGOCALCIFEROL) 50,000 unit Cap Take 1 capsule (50,000 Units total) by mouth every 7 days.    ezetimibe (ZETIA) 10 mg tablet Take 1 tablet (10 mg total) by mouth once daily.    folic acid (FOLVITE) 1 MG tablet Take 1 tablet (1 mg total) by mouth once daily.    furosemide (LASIX) 40 MG tablet Take 1 tablet (40 mg total) by mouth daily as needed (fluid).    insulin admin supplies InPn InPen device, use to inject mealtime insulin 3 times daily. 1 pen to use, 1 for backup as needed.    insulin degludec (TRESIBA FLEXTOUCH U-200) 200 unit/mL (3 mL) insulin pen Inject 50 Units into the skin 2 (two) times a day.    lamiVUDine (EPIVIR) 150 MG Tab TAKE 1 TABLET BY MOUTH EVERY DAY    LINZESS 72 mcg Cap capsule     metoprolol tartrate (LOPRESSOR) 50 MG tablet Take 1 tablet (50 mg total) by mouth every 12 (twelve) hours.    multivitamin (THERAGRAN) per tablet Take 1 tablet by mouth once daily.    nitroGLYCERIN (NITROSTAT) 0.4 MG SL tablet Place 1 tablet (0.4 mg total) under the tongue every 5 (five) minutes as needed for Chest pain.    OMNIPAQUE 350 350 mg iodine/mL Soln injection     ondansetron (ZOFRAN) 4 MG tablet Take 1 tablet (4 mg total) by mouth every 8 (eight) hours as needed for Nausea.    pen needle, diabetic (BD ULTRA-FINE RAHUL PEN NEEDLE) 32 gauge x 5/32" Ndle Use to inject insulin 5 times daily    potassium chloride (MICRO-K) 10 MEQ CpSR " Take 10 mEq by mouth once daily.    predniSONE (DELTASONE) 5 MG tablet Take 1 tablet (5 mg total) by mouth once daily.    PROAIR HFA 90 mcg/actuation inhaler     senna-docusate 8.6-50 mg (PERICOLACE) 8.6-50 mg per tablet Take 1 tablet by mouth 2 (two) times daily.    spironolactone (ALDACTONE) 25 MG tablet TAKE 1 TABLET BY MOUTH EVERY DAY    sulfamethoxazole-trimethoprim 800-160mg (BACTRIM DS) 800-160 mg Tab Take 1 tablet by mouth 2 (two) times daily.    vortioxetine (TRINTELLIX) 10 mg Tab Take 1 tablet (10 mg total) by mouth once daily. Take at 6am    zinc sulfate (ZINCATE) 50 mg zinc (220 mg) capsule Take 1 capsule (220 mg total) by mouth once daily. for 14 days     Family History       Problem Relation (Age of Onset)    Cancer Sister    Diabetes type I Father    Diabetes type II Mother    Hodgkin's lymphoma Mother    Kidney failure Father          Tobacco Use    Smoking status: Some Days     Packs/day: 1.00     Years: 35.00     Pack years: 35.00     Types: Cigarettes    Smokeless tobacco: Never   Substance and Sexual Activity    Alcohol use: Not Currently    Drug use: No    Sexual activity: Never     Review of Systems   Constitutional:  Positive for fever. Negative for chills and diaphoresis.   HENT:  Negative for ear pain.    Respiratory:  Negative for cough, shortness of breath and wheezing.    Cardiovascular:  Negative for chest pain and palpitations.   Gastrointestinal:  Negative for abdominal pain, constipation, diarrhea, nausea and vomiting.   Genitourinary:  Positive for testicular pain. Negative for decreased urine volume, difficulty urinating, dysuria, frequency, hematuria and urgency.   Musculoskeletal:  Positive for arthralgias and joint swelling. Negative for back pain, gait problem, myalgias, neck pain and neck stiffness.   Skin:  Positive for color change. Negative for rash and wound.   Neurological:  Negative for dizziness, syncope, light-headedness and headaches.   Hematological:  Does  not bruise/bleed easily.   Psychiatric/Behavioral:  Negative for agitation, confusion and decreased concentration.    Objective:     Vital Signs (Most Recent):  Temp: 99.4 °F (37.4 °C) (01/27/23 0344)  Pulse: (!) 113 (01/27/23 0412)  Resp: 18 (01/27/23 0528)  BP: 120/63 (01/27/23 0344)  SpO2: (!) 92 % (01/27/23 0344)   Vital Signs (24h Range):  Temp:  [97.9 °F (36.6 °C)-99.4 °F (37.4 °C)] 99.4 °F (37.4 °C)  Pulse:  [] 113  Resp:  [14-19] 18  SpO2:  [92 %-98 %] 92 %  BP: (118-153)/(61-77) 120/63     Weight: 95.3 kg (210 lb)  Body mass index is 29.3 kg/m².    Physical Exam  Vitals and nursing note reviewed.   Constitutional:       General: He is not in acute distress.     Appearance: He is well-developed and normal weight. He is not ill-appearing, toxic-appearing or diaphoretic.      Comments: Chronically ill and disheveled appearing. Has dried blood on his nose from a fall he reports was one week ago (states he had medical treatment after the fall).    HENT:      Head: Normocephalic and atraumatic.      Right Ear: External ear normal.      Left Ear: External ear normal.   Eyes:      General: No scleral icterus.        Right eye: No discharge.         Left eye: No discharge.      Conjunctiva/sclera: Conjunctivae normal.   Neck:      Vascular: No JVD.      Trachea: No tracheal deviation.   Cardiovascular:      Rate and Rhythm: Normal rate and regular rhythm.      Heart sounds: Normal heart sounds. No murmur heard.    No gallop.   Pulmonary:      Effort: Pulmonary effort is normal. No respiratory distress.      Breath sounds: Normal breath sounds. No stridor. No wheezing or rales.   Abdominal:      General: Bowel sounds are normal. There is no distension.      Palpations: Abdomen is soft. There is no mass.      Tenderness: There is no abdominal tenderness. There is no guarding.   Genitourinary:     Comments: Mild erythema of left scrotal area. No groin / thigh erythema appreciated.   Musculoskeletal:          General: Tenderness present. No swelling or deformity. Normal range of motion.      Cervical back: Normal range of motion and neck supple.   Skin:     General: Skin is warm and dry.   Neurological:      General: No focal deficit present.      Mental Status: He is alert and oriented to person, place, and time.      Cranial Nerves: No cranial nerve deficit.      Motor: No abnormal muscle tone.      Coordination: Coordination normal.   Psychiatric:         Mood and Affect: Mood normal.         Behavior: Behavior normal.         Thought Content: Thought content normal.         Judgment: Judgment normal.           Significant Labs: All pertinent labs within the past 24 hours have been reviewed.  BMP:   Recent Labs   Lab 01/26/23 2051   *   *   K 4.8   CL 97   CO2 25   BUN 9   CREATININE 0.8   CALCIUM 9.3     CBC:   Recent Labs   Lab 01/26/23 1608 01/26/23 2051 01/27/23  0343   WBC 8.09 8.00 7.61   HGB 11.9* 12.8* 11.9*   HCT 37.2* 38.4* 35.6*    290 274     CMP:   Recent Labs   Lab 01/26/23 1608 01/26/23 2051   * 133*   K 4.2 4.8    97   CO2 25 25   GLU 85 229*   BUN 9 9   CREATININE 0.9 0.8   CALCIUM 8.9 9.3   PROT 6.7 7.0   ALBUMIN 2.7* 2.8*   BILITOT 0.4 0.4   ALKPHOS 93 90   AST 16 15   ALT 15 16   ANIONGAP 10 11     Urine Culture: No results for input(s): LABURIN in the last 48 hours.  Urine Studies:   Recent Labs   Lab 01/26/23  2356   COLORU Yellow   APPEARANCEUA Clear   PHUR 7.0   SPECGRAV 1.030   PROTEINUA Negative   GLUCUA Negative   KETONESU 2+*   BILIRUBINUA Negative   OCCULTUA Negative   NITRITE Negative   UROBILINOGEN Negative   LEUKOCYTESUR Negative       Significant Imaging: I have reviewed all pertinent imaging results/findings within the past 24 hours.  Imaging Results              CT Pelvis With Contrast (Final result)  Result time 01/26/23 23:21:34      Final result by Latanya Liz MD (01/26/23 23:21:34)                   Impression:      Mild skin and soft  "tissue thickening involving the perineum and scrotum with mild associated subcutaneous stranding or inflammatory change.  Findings may relate to postsurgical change versus possible cellulitis.  No organized fluid collection or rim enhancing abscess seen.  No soft tissue air.    Dilated appendix measuring 10 mm, similar to previous CT.  No surrounding periappendiceal inflammatory changes are seen.      Electronically signed by: Latanya Liz MD  Date:    01/26/2023  Time:    23:21               Narrative:    EXAMINATION:  CT PELVIS WITH  CONTRAST    CLINICAL HISTORY:  Soft tissue infection suspected, pelvis, xray done;    TECHNIQUE:  CT of the pelvis was obtained following administration of 100 cc Omnipaque 350 IV contrast.  Sagittal and coronal reformats were obtained.    COMPARISON:  CT abdomen and pelvis from November 2022.    FINDINGS:  Mild skin and soft tissue thickening seen in the perineum and scrotum with mild associated subcutaneous soft tissue stranding or inflammatory change.  No fluid collection or rim enhancing abscess seen.  No soft tissue air is visualized.    Appendix is dilated measuring 10 mm, similar to previous CT.  No surrounding periappendiceal inflammatory changes are seen.  Visualized lower abdominal and intrapelvic contents otherwise show no acute abnormalities.  No bowel obstruction.  No evidence of pelvic abscess.  Urinary bladder and prostate are unremarkable.  No bowel obstruction.                                       Assessment/Plan:     * Cellulitis of groin  - Mr. Nithin Wagner presents with tenderness, erythema and a "cyst" that is fluctuant in the groin  - he was previously treated in this facility, and discharged to LTAC for management of Mary's Gangrene   - per his home meds list it appears he was still taking cephalexin 500 mg [id number], and Bactrim -160 mg BID since being discharged from LTAC on 1/25/23   - started on Cefepime and Metronidazole upon admission   - " "Urology consulted given concern for Mary's Gangrene       Rheumatoid arthritis  - chronic   - follows w/ Dr. Alcala   - per last Rheumatology note from 10/24/23:  "-continue  kevzara injections (Risks of TNF inhibitor discussed with patient and not limited to cell count abnormalities, malignancy,  GI perforation,allergic  reaction to medication, and increased risk of infection. Patient agrees with starting medication.)  -wean prednisone from 32. 5 mg a day to 30mg a day for one week then 25 mg a day for one week and then 20mg a day for one week and then come back so I can examine him.  I told him I am concerned he may be treating fibromyalgia with the steroids.  No MTX given MARI and baseline thrombocytopenia, poor lung reserve  Continue folic acid 1 mg po qday   -continue  lamivudine for Hep B core antibody  Follow up with GI  Continue bactrim MWF"  - has upcoming appointment 1/30/23  - per his Rheumatologist's note, he should currently be taking 20 mg prednisone daily and Bactrim DS    - after weaning to 20 mg prednisone daily he was to have a follow up appointment of which there is no record    - he reports he is taking not prednisone    - he denies Bactrim use   - PRN pain medications ordered     Type 2 diabetes mellitus with hyperglycemia, with long-term current use of insulin  - last A1C:   Lab Results   Component Value Date    HGBA1C 7.6 (H) 11/15/2022   - hold oral antidiabetic meds   - Diabetic diet   - SSI with accuchecks AC/HS      Anti-hyperglycemic dose as follows-   Antihyperglycemics (From admission, onward)    Start     Stop Route Frequency Ordered    01/27/23 0900  insulin detemir U-100 pen 35 Units         -- SubQ 2 times daily 01/27/23 0053 01/27/23 0715  insulin aspart U-100 pen 10 Units         -- SubQ 3 times daily with meals 01/27/23 0053 01/27/23 0153  insulin aspart U-100 pen 1-10 Units         -- SubQ Before meals & nightly PRN 01/27/23 0053        Hold Oral hypoglycemics while " patient is in the hospital.  - when questioned about his insulin doses he reported confusion but thinks he takes 40 units BID     Chronic diastolic heart failure  - no clinical signs of volume overload   - home meds: furosemide 40 mg QD PRN, metoprolol tartrate 50 mg BID, spironolactone 25 mg QD   - monitor I&Os and daily weights   - last Echo 10/28/22 EF 55%   Summary   The left ventricle is normal in size with concentric hypertrophy and normal systolic function.   Grade I left ventricular diastolic dysfunction.   Normal right ventricular size with normal right ventricular systolic function.   Thickened and calcified posterior mitral valve leaflet.        Coronary artery disease  - continue ASA 81 mg QD, ezetimibe 10 mg QD   - s/p stenting of RCA       Essential hypertension  - chronic   - normotensive at present   - home meds: metoprolol & spironolactone w/ PRN furosemide   - monitor     VTE Risk Mitigation (From admission, onward)         Ordered     heparin (porcine) injection 5,000 Units  Every 8 hours         01/27/23 0032     IP VTE HIGH RISK PATIENT  Once         01/27/23 0032     Place sequential compression device  Until discontinued         01/27/23 0032                   Marcia Vergara PA-C  Department of Hospital Medicine   [facility] - [facility] ([address])

## 2023-01-27 NOTE — ASSESSMENT & PLAN NOTE
"- Mr. Nithin Wagner presents with tenderness, erythema and a "cyst" that is fluctuant in the groin  - he was previously treated in this facility, and discharged to LTAC for management of Mary's Gangrene   - per his home meds list it appears he was still taking cephalexin 500 mg A3qsclp, and Bactrim -160 mg BID since being discharged from LTAC on 1/25/23   - started on Cefepime and Metronidazole upon admission   - Urology consulted given concern for Mary's Gangrene     "

## 2023-01-27 NOTE — SUBJECTIVE & OBJECTIVE
Past Medical History:   Diagnosis Date    Arthritis     Atrial myxoma     CHF (congestive heart failure)     Coronary atherosclerosis     Diabetes mellitus, type 2     Difficult intubation     Hepatitis B     Hyperlipidemia     Hypertension     Non-alcoholic fatty liver disease     Rheumatoid arthritis     Rheumatoid arthritis flare 07/12/2021    Stroke     TIA    Systolic heart failure        Past Surgical History:   Procedure Laterality Date    CORONARY ANGIOGRAPHY N/A 3/10/2021    Procedure: ANGIOGRAM, CORONARY ARTERY - right radial;  Surgeon: Shemar Dempsey MD;  Location: [facility] CATH LAB;  Service: Cardiology;  Laterality: N/A;    CORONARY STENT PLACEMENT  03/10/2021    prox-mid RCA [place] 4.5 x 26 mm, 4.5 x 12 mm    INCISION AND DRAINAGE OF SCROTUM N/A 11/15/2022    Procedure: INCISION AND DRAINAGE, SCROTUM;  Surgeon: William Hatch MD;  Location: [facility] OR;  Service: Urology;  Laterality: N/A;    INCISION AND DRAINAGE OF SCROTUM N/A 11/17/2022    Procedure: INCISION AND DRAINAGE, SCROTUM;  Surgeon: William Hatch MD;  Location: [facility] OR;  Service: Urology;  Laterality: N/A;    LUNG LOBECTOMY Right 2008    RUL lobectomy after removal of atrial myxoma    PLEURA BIOPSY      RESECTION OF ATRIAL MYXOMA  2007       Review of patient's allergies indicates:   Allergen Reactions    Enbrel [etanercept] Shortness Of Breath     CHF    Nsaids (non-steroidal anti-inflammatory drug) Other (See Comments)     hypertention  Other reaction(s): Other (See Comments)  hypertention  HTN    Statins-hmg-coa reductase inhibitors Other (See Comments)     Heart arrythemias  Other reaction(s): Other (See Comments)  Heart arrythemias  Joint pain and cardiac arrythmias    Pcn [penicillins] Rash       No current facility-administered medications on file prior to encounter.     Current Outpatient Medications on File Prior to Encounter   Medication Sig    NOVOLOG PENFILL U-100 INSULIN 100 unit/mL Crtg INJECT 14 UNITS INTO THE SKIN THREE  TIMES A DAY WITH MEALS.    oxyCODONE (ROXICODONE) 20 mg Tab immediate release tablet Take 1 tablet (20 mg total) by mouth every 4 (four) hours as needed for Pain.    ascorbic acid, vitamin C, (VITAMIN C) 500 MG tablet Take 1 tablet (500 mg total) by mouth once daily.    aspirin 81 MG Chew Take 81 mg by mouth.    blood sugar diagnostic Strp To check BG 3 times daily, to use with insurance preferred meter    blood-glucose meter kit Use to check glucose as directed    cephALEXin (KEFLEX) 500 MG capsule Take 1 capsule (500 mg total) by mouth every 8 (eight) hours. for 7 days    digoxin (LANOXIN) 125 mcg tablet Take 1 tablet (125 mcg total) by mouth once daily.    ergocalciferol (ERGOCALCIFEROL) 50,000 unit Cap Take 1 capsule (50,000 Units total) by mouth every 7 days.    ezetimibe (ZETIA) 10 mg tablet Take 1 tablet (10 mg total) by mouth once daily.    folic acid (FOLVITE) 1 MG tablet Take 1 tablet (1 mg total) by mouth once daily.    furosemide (LASIX) 40 MG tablet Take 1 tablet (40 mg total) by mouth daily as needed (fluid).    insulin admin supplies InPn InPen device, use to inject mealtime insulin 3 times daily. 1 pen to use, 1 for backup as needed.    insulin degludec (TRESIBA FLEXTOUCH U-200) 200 unit/mL (3 mL) insulin pen Inject 50 Units into the skin 2 (two) times a day.    lamiVUDine (EPIVIR) 150 MG Tab TAKE 1 TABLET BY MOUTH EVERY DAY    LINZESS 72 mcg Cap capsule     metoprolol tartrate (LOPRESSOR) 50 MG tablet Take 1 tablet (50 mg total) by mouth every 12 (twelve) hours.    multivitamin (THERAGRAN) per tablet Take 1 tablet by mouth once daily.    nitroGLYCERIN (NITROSTAT) 0.4 MG SL tablet Place 1 tablet (0.4 mg total) under the tongue every 5 (five) minutes as needed for Chest pain.    OMNIPAQUE 350 350 mg iodine/mL Soln injection     ondansetron (ZOFRAN) 4 MG tablet Take 1 tablet (4 mg total) by mouth every 8 (eight) hours as needed for Nausea.    pen needle, diabetic (BD ULTRA-FINE RAHUL PEN NEEDLE) 32  "gauge x 5/32" Ndle Use to inject insulin 5 times daily    potassium chloride (MICRO-K) 10 MEQ CpSR Take 10 mEq by mouth once daily.    predniSONE (DELTASONE) 5 MG tablet Take 1 tablet (5 mg total) by mouth once daily.    PROAIR HFA 90 mcg/actuation inhaler     senna-docusate 8.6-50 mg (PERICOLACE) 8.6-50 mg per tablet Take 1 tablet by mouth 2 (two) times daily.    spironolactone (ALDACTONE) 25 MG tablet TAKE 1 TABLET BY MOUTH EVERY DAY    sulfamethoxazole-trimethoprim 800-160mg (BACTRIM DS) 800-160 mg Tab Take 1 tablet by mouth 2 (two) times daily.    vortioxetine (TRINTELLIX) 10 mg Tab Take 1 tablet (10 mg total) by mouth once daily. Take at 6am    zinc sulfate (ZINCATE) 50 mg zinc (220 mg) capsule Take 1 capsule (220 mg total) by mouth once daily. for 14 days     Family History       Problem Relation (Age of Onset)    Cancer Sister    Diabetes type I Father    Diabetes type II Mother    Hodgkin's lymphoma Mother    Kidney failure Father          Tobacco Use    Smoking status: Some Days     Packs/day: 1.00     Years: 35.00     Pack years: 35.00     Types: Cigarettes    Smokeless tobacco: Never   Substance and Sexual Activity    Alcohol use: Not Currently    Drug use: No    Sexual activity: Never     Review of Systems   Constitutional:  Positive for fever. Negative for chills and diaphoresis.   HENT:  Negative for ear pain.    Respiratory:  Negative for cough, shortness of breath and wheezing.    Cardiovascular:  Negative for chest pain and palpitations.   Gastrointestinal:  Negative for abdominal pain, constipation, diarrhea, nausea and vomiting.   Genitourinary:  Positive for testicular pain. Negative for decreased urine volume, difficulty urinating, dysuria, frequency, hematuria and urgency.   Musculoskeletal:  Positive for arthralgias and joint swelling. Negative for back pain, gait problem, myalgias, neck pain and neck stiffness.   Skin:  Positive for color change. Negative for rash and wound.   Neurological:  " Negative for dizziness, syncope, light-headedness and headaches.   Hematological:  Does not bruise/bleed easily.   Psychiatric/Behavioral:  Negative for agitation, confusion and decreased concentration.    Objective:     Vital Signs (Most Recent):  Temp: 99.4 °F (37.4 °C) (01/27/23 0344)  Pulse: (!) 113 (01/27/23 0412)  Resp: 18 (01/27/23 0528)  BP: 120/63 (01/27/23 0344)  SpO2: (!) 92 % (01/27/23 0344)   Vital Signs (24h Range):  Temp:  [97.9 °F (36.6 °C)-99.4 °F (37.4 °C)] 99.4 °F (37.4 °C)  Pulse:  [] 113  Resp:  [14-19] 18  SpO2:  [92 %-98 %] 92 %  BP: (118-153)/(61-77) 120/63     Weight: 95.3 kg (210 lb)  Body mass index is 29.3 kg/m².    Physical Exam  Vitals and nursing note reviewed.   Constitutional:       General: He is not in acute distress.     Appearance: He is well-developed and normal weight. He is not ill-appearing, toxic-appearing or diaphoretic.      Comments: Chronically ill and disheveled appearing. Has dried blood on his nose from a fall he reports was one week ago (states he had medical treatment after the fall).    HENT:      Head: Normocephalic and atraumatic.      Right Ear: External ear normal.      Left Ear: External ear normal.   Eyes:      General: No scleral icterus.        Right eye: No discharge.         Left eye: No discharge.      Conjunctiva/sclera: Conjunctivae normal.   Neck:      Vascular: No JVD.      Trachea: No tracheal deviation.   Cardiovascular:      Rate and Rhythm: Normal rate and regular rhythm.      Heart sounds: Normal heart sounds. No murmur heard.    No gallop.   Pulmonary:      Effort: Pulmonary effort is normal. No respiratory distress.      Breath sounds: Normal breath sounds. No stridor. No wheezing or rales.   Abdominal:      General: Bowel sounds are normal. There is no distension.      Palpations: Abdomen is soft. There is no mass.      Tenderness: There is no abdominal tenderness. There is no guarding.   Genitourinary:     Comments: Mild erythema of  left scrotal area. No groin / thigh erythema appreciated.   Musculoskeletal:         General: Tenderness present. No swelling or deformity. Normal range of motion.      Cervical back: Normal range of motion and neck supple.   Skin:     General: Skin is warm and dry.   Neurological:      General: No focal deficit present.      Mental Status: He is alert and oriented to person, place, and time.      Cranial Nerves: No cranial nerve deficit.      Motor: No abnormal muscle tone.      Coordination: Coordination normal.   Psychiatric:         Mood and Affect: Mood normal.         Behavior: Behavior normal.         Thought Content: Thought content normal.         Judgment: Judgment normal.           Significant Labs: All pertinent labs within the past 24 hours have been reviewed.  BMP:   Recent Labs   Lab 01/26/23 2051   *   *   K 4.8   CL 97   CO2 25   BUN 9   CREATININE 0.8   CALCIUM 9.3     CBC:   Recent Labs   Lab 01/26/23  1608 01/26/23 2051 01/27/23  0343   WBC 8.09 8.00 7.61   HGB 11.9* 12.8* 11.9*   HCT 37.2* 38.4* 35.6*    290 274     CMP:   Recent Labs   Lab 01/26/23 1608 01/26/23 2051   * 133*   K 4.2 4.8    97   CO2 25 25   GLU 85 229*   BUN 9 9   CREATININE 0.9 0.8   CALCIUM 8.9 9.3   PROT 6.7 7.0   ALBUMIN 2.7* 2.8*   BILITOT 0.4 0.4   ALKPHOS 93 90   AST 16 15   ALT 15 16   ANIONGAP 10 11     Urine Culture: No results for input(s): LABURIN in the last 48 hours.  Urine Studies:   Recent Labs   Lab 01/26/23  2356   COLORU Yellow   APPEARANCEUA Clear   PHUR 7.0   SPECGRAV 1.030   PROTEINUA Negative   GLUCUA Negative   KETONESU 2+*   BILIRUBINUA Negative   OCCULTUA Negative   NITRITE Negative   UROBILINOGEN Negative   LEUKOCYTESUR Negative       Significant Imaging: I have reviewed all pertinent imaging results/findings within the past 24 hours.  Imaging Results              CT Pelvis With Contrast (Final result)  Result time 01/26/23 23:21:34      Final result by Latanya BELTRÁN  MD Shalonda (01/26/23 23:21:34)                   Impression:      Mild skin and soft tissue thickening involving the perineum and scrotum with mild associated subcutaneous stranding or inflammatory change.  Findings may relate to postsurgical change versus possible cellulitis.  No organized fluid collection or rim enhancing abscess seen.  No soft tissue air.    Dilated appendix measuring 10 mm, similar to previous CT.  No surrounding periappendiceal inflammatory changes are seen.      Electronically signed by: Latanya Liz MD  Date:    01/26/2023  Time:    23:21               Narrative:    EXAMINATION:  CT PELVIS WITH  CONTRAST    CLINICAL HISTORY:  Soft tissue infection suspected, pelvis, xray done;    TECHNIQUE:  CT of the pelvis was obtained following administration of 100 cc Omnipaque 350 IV contrast.  Sagittal and coronal reformats were obtained.    COMPARISON:  CT abdomen and pelvis from November 2022.    FINDINGS:  Mild skin and soft tissue thickening seen in the perineum and scrotum with mild associated subcutaneous soft tissue stranding or inflammatory change.  No fluid collection or rim enhancing abscess seen.  No soft tissue air is visualized.    Appendix is dilated measuring 10 mm, similar to previous CT.  No surrounding periappendiceal inflammatory changes are seen.  Visualized lower abdominal and intrapelvic contents otherwise show no acute abnormalities.  No bowel obstruction.  No evidence of pelvic abscess.  Urinary bladder and prostate are unremarkable.  No bowel obstruction.

## 2023-01-27 NOTE — ASSESSMENT & PLAN NOTE
- chronic   - normotensive at present   - home meds: metoprolol & spironolactone w/ PRN furosemide   - monitor

## 2023-01-27 NOTE — PROGRESS NOTES
"[facility] Surg [address] Medicine  Progress Note    Patient Name: Nithin Wagner  MRN: [id number]  Patient Class: OP- Observation   Admission Date: 1/26/2023  Length of Stay: 0 days  Attending Physician: Hetal Harper MD  Primary Care Provider: Tushar Drew MD        Subjective:     Principal Problem:Rheumatoid arthritis flare        HPI:  From H&P by Marcia Vergara PA:  ". Nithin Wagner is a 67 y.o. male, with PMH of Mary's gangrene, T2DM, obesity, CAD, CHF, HTN, RA, PVD, who [place] [facility] [place] due to concern for infection in the groin as he started with groin pain 1 week ago. He notes associated chills, fever, abdominal pain in the ED. Upon admission exam he focuses mainly on the pain in his joints, specifically his left wrist. He states that he had previously been treated for a groin infection, and had surgery of the scrotum for gangrene. Afterwards he was at [facility], and was discharged one week ago. He had a fall, after which his scrotum was tender, and then one week ago he noticed a cyst. His home health nurse visited him today, and kirk blood work. Afterwards he developed a fever. He was evaluated in the ED with labs showing no leukocytosis or left shift on CBC. A metabolic panel showed elevated glucose of 229 without evidence for DKA. A CT of the pelvis showed skin and soft tissue thickening of the perineum and scrotum with mild associated subcutaneous stranding or inflammatory change. He was treated in the ED with pain medication."      Overview/Hospital Course:  Patient was admitted for treatment of presumed cellulitis and a flare of rheumatoid arthritis.  He had severe pain in multiple joints, left wrist, right shoulder and hip.  Urology and wound care evaluated his scrotum and noted some partial thickness skin loss along the healing surgical incision consistent with folliculitis, no drainage noted.  Barrier ointment recommended.  He was continued on antibiotics " and rheumatology was contacted regarding the flare of RA.  Recommended methylprednisolone 125 mg bid if infection ruled out.      Interval History: Patient is new to me.  He complains of severe pain in his left hand and can barely move.  Shoulder and hip also painful.  Very uncomfortable.  Groin is not really bothering him.    Review of Systems   Constitutional:  Negative for chills and fever.   Respiratory:  Negative for cough and shortness of breath.    Cardiovascular:  Negative for chest pain and palpitations.   Genitourinary:  Positive for scrotal swelling.   Musculoskeletal:  Positive for arthralgias and joint swelling.   Objective:     Vital Signs (Most Recent):  Temp: 99.1 °F (37.3 °C) (01/27/23 1610)  Pulse: 82 (01/27/23 1610)  Resp: 18 (01/27/23 1610)  BP: (!) 143/82 (01/27/23 1610)  SpO2: (!) 93 % (01/27/23 1610)   Vital Signs (24h Range):  Temp:  [97.9 °F (36.6 °C)-99.4 °F (37.4 °C)] 99.1 °F (37.3 °C)  Pulse:  [] 82  Resp:  [14-20] 18  SpO2:  [92 %-98 %] 93 %  BP: (118-153)/(61-82) 143/82     Weight: 95.3 kg (210 lb)  Body mass index is 29.3 kg/m².    Intake/Output Summary (Last 24 hours) at 1/27/2023 1644  Last data filed at 1/27/2023 0540  Gross per 24 hour   Intake 387.57 ml   Output --   Net 387.57 ml      Physical Exam  Constitutional:       Comments: Chronically ill-appearing.   Cardiovascular:      Rate and Rhythm: Normal rate and regular rhythm.      Pulses: Normal pulses.      Heart sounds: Normal heart sounds. No murmur heard.    No gallop.   Pulmonary:      Effort: Pulmonary effort is normal.      Breath sounds: Normal breath sounds.   Abdominal:      General: Bowel sounds are normal.      Palpations: Abdomen is soft.   Musculoskeletal:      Comments: Joint swelling noted left wrist/hand, decreased range of motion right shoulder, hip.   Skin:     General: Skin is warm and dry.      Comments: Multiple abrasions bridge of nose.   Neurological:      General: No focal deficit present.       "Mental Status: He is alert and oriented to person, place, and time.       Significant Labs: All pertinent labs within the past 24 hours have been reviewed.    Significant Imaging: I have reviewed all pertinent imaging results/findings within the past 24 hours.      Assessment/Plan:      * Rheumatoid arthritis flare  Chief complaint severe pain and decreased mobility of multiple joints, especially the left hand/wrist which is markedly swollen.  Has pain and stiffness in right should and hip as well.  Off chronic steroids for a few months since he was admitted (November) with Mary's.  Discussed with Dr. Alcala and IV steroids recommended starting with solu-medrol 125 mg bid.      Folliculitis  - Patient presented with tenderness, erythema and a "cyst" that is fluctuant in the groin  - Evaluated by urology, noted to have some mild erythema/induration along the surgical wound and a small cyst along the inner thigh.  Nothing operative.  Diagnosed with mild folliculitis  - he was previously treated in this facility, and discharged to LTAC for management of Mary's Gangrene   - Patient was prescribed Keflex by urgent care for possible scrotal cellulitis, looks OK currently   - started on Cefepime and Metronidazole upon admission, will continue this for a day or two while he is being treated with steroids.      Rheumatoid arthritis involving multiple sites with positive rheumatoid factor  Rheumatoid arthritis flare  - Followed by Dr. Alcala, patient did not tolerate Enbrel  - Kevzara injections planned, held for Mary's treatment.  - Patient weaned off steroids several months ago  - No MTX given MARI and baseline thrombocytopenia, poor lung reserve  - Continue folic acid 1 mg po qday   - Patient was on lamivudine for positive hepatitis B core Ab and is supposed to follow up with GI  Follow up with GI  - has upcoming appointment 1/30/23  - PRN pain medications ordered     Chronic diastolic heart failure  - " Appears compensated   - home meds: furosemide 40 mg QD PRN, metoprolol tartrate 50 mg BID, spironolactone 25 mg QD   - monitor I&Os and daily weights   - last Echo 10/28/22 EF 55%   Summary   The left ventricle is normal in size with concentric hypertrophy and normal systolic function.   Grade I left ventricular diastolic dysfunction.   Normal right ventricular size with normal right ventricular systolic function.   Thickened and calcified posterior mitral valve leaflet.        Coronary artery disease  - continue ASA 81 mg QD, ezetimibe 10 mg QD   - s/p stenting of RCA       Type 2 diabetes mellitus with hyperglycemia, with long-term current use of insulin  - last A1C:   Lab Results   Component Value Date    HGBA1C 7.6 (H) 11/15/2022   - hold oral antidiabetic meds   - Diabetic diet   - SSI with accuchecks AC/HS      Anti-hyperglycemic dose as follows-   Antihyperglycemics (From admission, onward)    Start     Stop Route Frequency Ordered    01/27/23 0900  insulin detemir U-100 pen 35 Units         -- SubQ 2 times daily 01/27/23 0053    01/27/23 0715  insulin aspart U-100 pen 10 Units         -- SubQ 3 times daily with meals 01/27/23 0053 01/27/23 0153  insulin aspart U-100 pen 1-10 Units         -- SubQ Before meals & nightly PRN 01/27/23 0053        Hold Oral hypoglycemics while patient is in the hospital.  - when questioned about his insulin doses he reported confusion but thinks he takes 40 units BID     Essential hypertension  - chronic   - normotensive at present   - home meds: metoprolol & spironolactone w/ PRN furosemide   - monitor       VTE Risk Mitigation (From admission, onward)         Ordered     heparin (porcine) injection 5,000 Units  Every 8 hours         01/27/23 0032     IP VTE HIGH RISK PATIENT  Once         01/27/23 0032     Place sequential compression device  Until discontinued         01/27/23 0032                Discharge Planning   ALEXANDER:      Code Status: Full Code   Is the patient  medically ready for discharge?:     Reason for patient still in hospital (select all that apply): Patient unstable, Patient new problem, Patient trending condition, Treatment and Consult recommendations                     Hetal Infante MD  Department of Hospital Medicine   [facility] ([address])

## 2023-01-27 NOTE — HOSPITAL COURSE
Patient was admitted for treatment of presumed cellulitis and a flare of rheumatoid arthritis.  He had severe pain in multiple joints, left wrist, right shoulder and hip.  Urology and wound care evaluated his scrotum and noted some partial thickness skin loss along the healing surgical incision, no drainage noted.  Urology evaluated the wound and diagnosed mild folliculitis.  Barrier ointment was recommended.  He was continued on antibiotics and rheumatology was contacted regarding the flare of RA.  Recommended methylprednisolone 125 mg bid if infection ruled out, started medication in the evening 1/27.  He had very good improvement in RA symptoms with steroids, and pain and swelling in his left hand resolved by the following day.    Patient has plenty of prednisone at home so was instructed to start taking 40 mg twice daily.  He has follow up scheduled with rheumatology on 1/30/23 (tomorrow) and any changes to his regimen may be made during his follow up appointment.  For folliculitis he was prescribed one week of Bactrim DS.  His blood glucose was elevated on methylprednisolone and he required increased insulin dosing while he was here.  He has follow up appointments already scheduled in the coming week with endocrinology as well as ID.  Patient was feeling well on discharge with joint swelling resolved and pain much improved.

## 2023-01-27 NOTE — PROGRESS NOTES
Nurses Note -- 4 Eyes      1/27/2023   1:15 AM      Skin assessed during: Admit      [] No Pressure Injuries Present    []Prevention Measures Documented      [x] Yes- Altered Skin Integrity Present or Discovered   [x] LDA Added if Not in Epic (Describe Wound)   [x] New Altered Skin Integrity was Present on Admit and Documented in LDA   [] Wound Image Taken    Wound Care Consulted? Yes    Attending Nurse:  Jannie Sanches RN     Second RN/Staff Member:  STACI Garduno

## 2023-01-27 NOTE — CONSULTS
[facility] ([address])  Wound Care    Patient Name:  Nithin Wagner   MRN:  [id number]  Date: 1/27/2023  Diagnosis: Cellulitis of groin    History:     Past Medical History:   Diagnosis Date    Arthritis     Atrial myxoma     CHF (congestive heart failure)     Coronary atherosclerosis     Diabetes mellitus, type 2     Difficult intubation     Hepatitis B     Hyperlipidemia     Hypertension     Non-alcoholic fatty liver disease     Rheumatoid arthritis     Rheumatoid arthritis flare 07/12/2021    Stroke     TIA    Systolic heart failure        Social History     Socioeconomic History    Marital status: Single   Occupational History    Occupation: , respiratory therapist, [employer]     Comment: Retired   Tobacco Use    Smoking status: Every Day     Packs/day: 1.00     Years: 35.00     Pack years: 35.00     Types: Cigarettes    Smokeless tobacco: Never   Substance and Sexual Activity    Alcohol use: Not Currently    Drug use: No    Sexual activity: Never     Social Determinants of Health     Financial Resource Strain: High Risk    Difficulty of Paying Living Expenses: Hard   Food Insecurity: No Food Insecurity    Worried About Running Out of Food in the Last Year: Never true    Ran Out of Food in the Last Year: Never true   Transportation Needs: Unmet Transportation Needs    Lack of Transportation (Medical): Yes    Lack of Transportation (Non-Medical): Yes   Physical Activity: Inactive    Days of Exercise per Week: 0 days    Minutes of Exercise per Session: 0 min   Stress: Stress Concern Present    Feeling of Stress : To some extent   Social Connections: Socially Isolated    Frequency of Communication with Friends and Family: More than three times a week    Frequency of Social Gatherings with Friends and Family: More than three times a week    Attends Judaism Services: Never    Active Member of Clubs or Organizations: No    Attends Club or Organization Meetings: Never    Marital  Status: Never    Housing Stability: High Risk    Unable to Pay for Housing in the Last Year: Yes    Number of Places Lived in the Last Year: 1    Unstable Housing in the Last Year: No       Precautions:     Allergies as of 01/26/2023 - Reviewed 01/26/2023   Allergen Reaction Noted    Enbrel [etanercept] Shortness Of Breath 07/12/2021    Nsaids (non-steroidal anti-inflammatory drug) Other (See Comments) 06/29/2015    Statins-hmg-coa reductase inhibitors Other (See Comments) 06/29/2015    Pcn [penicillins] Rash 06/29/2015       WO Assessment Details/Treatment     Wound care consult received for assessment of scrotum. Patient known to wound care from previous admissions and admitted for cellulitis of groin. Past medical history includes   Mary's gangrene, T2DM, obesity, CAD, CHF, HTN, RA, PVD. He had an I&D of scrotum with urology in 11/2022 and has been completing daily wound care.     Upon assessment to scrotum noted healing surgical wound with small area of partial thickness skin loss still exposed along incision line. No areas of purulent drainage noted with some mild erythema. Recommend a thin application of triad ointment to healing partial thickness skin loss along incision to promote moist wound healing. Nursing and MD team notified. Orders placed. Wound care to assist with pt prn.        01/27/23 1403        Incision/Site 11/17/22 1639 Left Scrotum lateral vertical   Date First Assessed/Time First Assessed: 11/17/22 1639   Side: Left  Location: Scrotum  Orientation: lateral  Incision Type: vertical   Wound Image    Incision WDL ex   Dressing Appearance Open to air   Drainage Amount Scant   Drainage Characteristics/Odor Serous;No odor   Appearance Pink;Moist   Periwound Area Intact;Dry   Wound Edges Open   Wound Length (cm) 4 cm   Wound Width (cm) 1 cm   Wound Depth (cm) 0.1 cm   Wound Volume (cm^3) 0.4 cm^3   Wound Surface Area (cm^2) 4 cm^2   Care Cleansed with:;Antimicrobial agent;Applied:;Skin  Barrier  (Triad ointment)       01/27/2023

## 2023-01-27 NOTE — ED PROVIDER NOTES
Encounter Date: 1/26/2023    SCRIBE #1 NOTE: I, Manjit Pretty, am scribing for, and in the presence of,  Bo Darnell II, MD.     History     Chief Complaint   Patient presents with    Groin Pain     Post op for infection in groin area on 11/11. Pt was seen by MD today was told to come in for steroid treatment with monitoring. Pain in groin and joints 9/10     Time seen by provider: 8:55 PM    This is a 67 y.o. male with PMHx of diabetes and gangrene who presents with complaint of groin pain onset 1 week ago. Associated symptoms include chills, fever, and abdominal pain. Pt reports that he recently had surgery to treat gangrene on in scrotum, particularly the left testicle. He states that he was in post-op rehabilitation and feeling fine, then he was discharged last week. As he was walking to a pharmacy to attain his prescriptions, he tripped and fell. He states that he received medical care following the fall and had no remarkable findings in his X-Rays. He says that at the time his scrotum felt tender, but not painful. Pt states that he then saw a cyst about 1 week ago and saw a home health nurse today, but has not received his blood test results yet from the home health visit. He came to the ED today for his high fever, which he fears may be secondary to the previously infected region. Lastly, pt reports that he currently smokes, and is prescribed and takes OxyContin for pain post surgery. This is the extent of the patient's complaints at this time.    The history is provided by the patient.   Review of patient's allergies indicates:   Allergen Reactions    Enbrel [etanercept] Shortness Of Breath     CHF    Nsaids (non-steroidal anti-inflammatory drug) Other (See Comments)     hypertention  Other reaction(s): Other (See Comments)  hypertention  HTN    Statins-hmg-coa reductase inhibitors Other (See Comments)     Heart arrythemias  Other reaction(s): Other (See Comments)  Heart arrythemias  Joint pain and  cardiac arrythmias    Pcn [penicillins] Rash     Past Medical History:   Diagnosis Date    Arthritis     Atrial myxoma     CHF (congestive heart failure)     Coronary atherosclerosis     Diabetes mellitus, type 2     Difficult intubation     Hepatitis B     Hyperlipidemia     Hypertension     Non-alcoholic fatty liver disease     Rheumatoid arthritis     Rheumatoid arthritis flare 07/12/2021    Stroke     TIA    Systolic heart failure      Past Surgical History:   Procedure Laterality Date    CORONARY ANGIOGRAPHY N/A 3/10/2021    Procedure: ANGIOGRAM, CORONARY ARTERY - right radial;  Surgeon: Shemar Dempsey MD;  Location: [facility], [place], operated by [facility] CATH LAB;  Service: Cardiology;  Laterality: N/A;    CORONARY STENT PLACEMENT  03/10/2021    prox-mid RCA [place] 4.5 x 26 mm, 4.5 x 12 mm    INCISION AND DRAINAGE OF SCROTUM N/A 11/15/2022    Procedure: INCISION AND DRAINAGE, SCROTUM;  Surgeon: William Hatch MD;  Location: [facility], [place], operated by [facility] OR;  Service: Urology;  Laterality: N/A;    INCISION AND DRAINAGE OF SCROTUM N/A 11/17/2022    Procedure: INCISION AND DRAINAGE, SCROTUM;  Surgeon: William Hatch MD;  Location: [facility];  Service: Urology;  Laterality: N/A;    LUNG LOBECTOMY Right 2008    RUL lobectomy after removal of atrial myxoma    PLEURA BIOPSY      RESECTION OF ATRIAL MYXOMA  2007     Family History   Problem Relation Age of Onset    Hodgkin's lymphoma Mother     Diabetes type II Mother     Kidney failure Father     Diabetes type I Father     Cancer Sister      Social History     Tobacco Use    Smoking status: Some Days     Packs/day: 1.00     Years: 35.00     Pack years: 35.00     Types: Cigarettes    Smokeless tobacco: Never   Substance Use Topics    Alcohol use: Not Currently    Drug use: No     Review of Systems   Constitutional:  Positive for chills and fever.   HENT:  Negative for sore throat.    Respiratory:  Negative for shortness of breath.    Cardiovascular:  Negative for chest pain.   Gastrointestinal:  Positive for abdominal  pain. Negative for nausea.   Genitourinary:  Positive for testicular pain. Negative for dysuria.   Musculoskeletal:  Negative for back pain.        Positive for groin pain.    Skin:  Negative for rash.   Neurological:  Negative for weakness.   Hematological:  Does not bruise/bleed easily.     Physical Exam     Initial Vitals [01/26/23 1947]   BP Pulse Resp Temp SpO2   119/66 (!) 125 18 97.9 °F (36.6 °C) 95 %      MAP       --         Physical Exam    Nursing note and vitals reviewed.  Constitutional: He appears well-developed and well-nourished.   Uncomfortable appearing, but in no distress.    HENT:   Head: Normocephalic and atraumatic.   Dry mucous membranes.   Eyes: Conjunctivae are normal. No scleral icterus.   Neck: Neck supple.   Cardiovascular:  Regular rhythm, normal heart sounds and intact distal pulses.   Tachycardia present.         No murmur heard.  Pulmonary/Chest: Breath sounds normal. No respiratory distress. He has no wheezes. He has no rhonchi. He has no rales.   Abdominal: Abdomen is soft. There is no abdominal tenderness. There is no rebound and no guarding.   Genitourinary:    Genitourinary Comments: Post surgery changes to left scrotum with no specific erythema, but no drainage. Erythema of right perineum and proximal medial thigh area. Tenderness and fluctuance just lateral to the inguinal crease.     Musculoskeletal:      Cervical back: Neck supple.      Comments: 1+ edema to ankles bilaterally.      Neurological: He is alert and oriented to person, place, and time.   Skin: Skin is warm and dry.   Warm to touch, but not diaphoretic.    Psychiatric: He has a normal mood and affect.       ED Course   Procedures  Labs Reviewed   CBC W/ AUTO DIFFERENTIAL - Abnormal; Notable for the following components:       Result Value    RBC 4.04 (*)     Hemoglobin 12.8 (*)     Hematocrit 38.4 (*)     MCH 31.7 (*)     Gran % 73.1 (*)     Lymph % 17.1 (*)     All other components within normal limits    COMPREHENSIVE METABOLIC PANEL - Abnormal; Notable for the following components:    Sodium 133 (*)     Glucose 229 (*)     Albumin 2.8 (*)     All other components within normal limits   URINALYSIS, REFLEX TO URINE CULTURE - Abnormal; Notable for the following components:    Ketones, UA 2+ (*)     All other components within normal limits    Narrative:     Specimen Source->Urine   CULTURE, BLOOD   CULTURE, BLOOD          Imaging Results              CT Pelvis With Contrast (Final result)  Result time 01/26/23 23:21:34      Final result by Latanya Liz MD (01/26/23 23:21:34)                   Impression:      Mild skin and soft tissue thickening involving the perineum and scrotum with mild associated subcutaneous stranding or inflammatory change.  Findings may relate to postsurgical change versus possible cellulitis.  No organized fluid collection or rim enhancing abscess seen.  No soft tissue air.    Dilated appendix measuring 10 mm, similar to previous CT.  No surrounding periappendiceal inflammatory changes are seen.      Electronically signed by: Latanya Liz MD  Date:    01/26/2023  Time:    23:21               Narrative:    EXAMINATION:  CT PELVIS WITH  CONTRAST    CLINICAL HISTORY:  Soft tissue infection suspected, pelvis, xray done;    TECHNIQUE:  CT of the pelvis was obtained following administration of 100 cc Omnipaque 350 IV contrast.  Sagittal and coronal reformats were obtained.    COMPARISON:  CT abdomen and pelvis from November 2022.    FINDINGS:  Mild skin and soft tissue thickening seen in the perineum and scrotum with mild associated subcutaneous soft tissue stranding or inflammatory change.  No fluid collection or rim enhancing abscess seen.  No soft tissue air is visualized.    Appendix is dilated measuring 10 mm, similar to previous CT.  No surrounding periappendiceal inflammatory changes are seen.  Visualized lower abdominal and intrapelvic contents otherwise show no acute abnormalities.   No bowel obstruction.  No evidence of pelvic abscess.  Urinary bladder and prostate are unremarkable.  No bowel obstruction.                                       Medications   metronidazole IVPB 500 mg (has no administration in time range)   cefepime in dextrose 5 % IVPB 2 g (has no administration in time range)   sodium chloride 0.9% flush 10 mL (has no administration in time range)   melatonin tablet 6 mg (has no administration in time range)   senna-docusate 8.6-50 mg per tablet 1 tablet (has no administration in time range)   acetaminophen tablet 650 mg (has no administration in time range)   naloxone 0.4 mg/mL injection 0.02 mg (has no administration in time range)   heparin (porcine) injection 5,000 Units (has no administration in time range)   HYDROmorphone injection 0.5 mg (0.5 mg Intravenous Given 1/26/23 2136)   iohexoL (OMNIPAQUE 350) injection 100 mL (100 mLs Intravenous Given 1/26/23 2239)     Medical Decision Making:   History:   Old Medical Records: I decided to obtain old medical records.  Clinical Tests:   Lab Tests: Ordered and Reviewed  Radiological Study: Ordered and Reviewed     Patient presents with report of subjective fevers and chills at home, reports his highest recorded temperature at home was 99.8 although he has been taking Tylenol containing Vicodin regularly.  Patient also reports increased pain in the left scrotum and had a focal spot on the right medial proximal thigh.  His history is concerning for recent sepsis secondary to Mary's gangrene requiring multiple debridements.  On exam he does have erythema of scrotum and in the right inguinal crease, perineum as well as a focal spot of tenderness and fluctuance near the crease on the right, however it is unclear to what degree these are chronic changes from recent Mayr's or what represents new/progressive symptoms.  Laboratory studies show normal CBC, mild hyperglycemia without evidence of DKA.  Normal electrolytes.  CT scan  does show inflammation versus cellulitis, but no evidence of abscess, subcutaneous air or evidence of gangrene.  Given a concern for possible recurrence of perineal cellulitis I feel is reasonable to place the patient on antibiotics after blood cultures have been obtained and admit overnight for observation and evaluation/recommendations by urology in the morning.  Case discussed with CARLOS Vergara of the hospitalist Service, admit to Dr. Infante.       Scribe Attestation:   Scribe #1: I performed the above scribed service and the documentation accurately describes the services I performed. I attest to the accuracy of the note.            Physician Attestation for Scribe: I, [facility], reviewed documentation as scribed in my presence, which is both accurate and complete.       Clinical Impression:   Final diagnoses:  [L03.314] Cellulitis of groin (Primary)  [E11.65, Z79.4] Type 2 diabetes mellitus with hyperglycemia, with long-term current use of insulin        ED Disposition Condition    Observation Stable                Bo Darnell II, MD  01/27/23 0038

## 2023-01-27 NOTE — ASSESSMENT & PLAN NOTE
-Pt still with bilateral shoulder pain from fall while picking up his medications at [facility] pharmacy. R worse than L.  Pt did not complete shoulder xrays as ordered. All other xrays negative for fx.   -Advised pt to have xray performed, V.U..

## 2023-01-27 NOTE — ASSESSMENT & PLAN NOTE
- Appears compensated   - home meds: furosemide 40 mg QD PRN, metoprolol tartrate 50 mg BID, spironolactone 25 mg QD   - monitor I&Os and daily weights   - last Echo 10/28/22 EF 55%   Summary   The left ventricle is normal in size with concentric hypertrophy and normal systolic function.   Grade I left ventricular diastolic dysfunction.   Normal right ventricular size with normal right ventricular systolic function.   Thickened and calcified posterior mitral valve leaflet.

## 2023-01-28 PROBLEM — F41.9 ANXIETY AND DEPRESSION: Status: ACTIVE | Noted: 2023-01-28

## 2023-01-28 PROBLEM — F32.A ANXIETY AND DEPRESSION: Status: ACTIVE | Noted: 2023-01-28

## 2023-01-28 LAB
ANION GAP SERPL CALC-SCNC: 11 MMOL/L (ref 8–16)
BASOPHILS # BLD AUTO: 0.01 K/UL (ref 0–0.2)
BASOPHILS NFR BLD: 0.1 % (ref 0–1.9)
BUN SERPL-MCNC: 11 MG/DL (ref 8–23)
CALCIUM SERPL-MCNC: 9.7 MG/DL (ref 8.7–10.5)
CHLORIDE SERPL-SCNC: 100 MMOL/L (ref 95–110)
CO2 SERPL-SCNC: 24 MMOL/L (ref 23–29)
CREAT SERPL-MCNC: 0.8 MG/DL (ref 0.5–1.4)
DIFFERENTIAL METHOD: ABNORMAL
EOSINOPHIL # BLD AUTO: 0 K/UL (ref 0–0.5)
EOSINOPHIL NFR BLD: 0 % (ref 0–8)
ERYTHROCYTE [DISTWIDTH] IN BLOOD BY AUTOMATED COUNT: 11.7 % (ref 11.5–14.5)
EST. GFR  (NO RACE VARIABLE): >60 ML/MIN/1.73 M^2
GLUCOSE SERPL-MCNC: 159 MG/DL (ref 70–110)
HCT VFR BLD AUTO: 38.4 % (ref 40–54)
HGB BLD-MCNC: 12.7 G/DL (ref 14–18)
IMM GRANULOCYTES # BLD AUTO: 0.04 K/UL (ref 0–0.04)
IMM GRANULOCYTES NFR BLD AUTO: 0.5 % (ref 0–0.5)
LYMPHOCYTES # BLD AUTO: 0.9 K/UL (ref 1–4.8)
LYMPHOCYTES NFR BLD: 12.8 % (ref 18–48)
MAGNESIUM SERPL-MCNC: 2.2 MG/DL (ref 1.6–2.6)
MCH RBC QN AUTO: 31.1 PG (ref 27–31)
MCHC RBC AUTO-ENTMCNC: 33.1 G/DL (ref 32–36)
MCV RBC AUTO: 94 FL (ref 82–98)
MONOCYTES # BLD AUTO: 0.3 K/UL (ref 0.3–1)
MONOCYTES NFR BLD: 4.5 % (ref 4–15)
NEUTROPHILS # BLD AUTO: 6 K/UL (ref 1.8–7.7)
NEUTROPHILS NFR BLD: 82.1 % (ref 38–73)
NRBC BLD-RTO: 0 /100 WBC
PLATELET # BLD AUTO: 333 K/UL (ref 150–450)
PMV BLD AUTO: 10.4 FL (ref 9.2–12.9)
POCT GLUCOSE: 157 MG/DL (ref 70–110)
POCT GLUCOSE: 187 MG/DL (ref 70–110)
POCT GLUCOSE: 192 MG/DL (ref 70–110)
POCT GLUCOSE: 211 MG/DL (ref 70–110)
POCT GLUCOSE: 289 MG/DL (ref 70–110)
POTASSIUM SERPL-SCNC: 4.4 MMOL/L (ref 3.5–5.1)
RBC # BLD AUTO: 4.08 M/UL (ref 4.6–6.2)
SODIUM SERPL-SCNC: 135 MMOL/L (ref 136–145)
WBC # BLD AUTO: 7.32 K/UL (ref 3.9–12.7)

## 2023-01-28 PROCEDURE — 99233 SBSQ HOSP IP/OBS HIGH 50: CPT | Mod: ,,, | Performed by: HOSPITALIST

## 2023-01-28 PROCEDURE — S0030 INJECTION, METRONIDAZOLE: HCPCS | Performed by: PHYSICIAN ASSISTANT

## 2023-01-28 PROCEDURE — 85025 COMPLETE CBC W/AUTO DIFF WBC: CPT | Performed by: PHYSICIAN ASSISTANT

## 2023-01-28 PROCEDURE — 96366 THER/PROPH/DIAG IV INF ADDON: CPT

## 2023-01-28 PROCEDURE — 94761 N-INVAS EAR/PLS OXIMETRY MLT: CPT

## 2023-01-28 PROCEDURE — S4991 NICOTINE PATCH NONLEGEND: HCPCS | Performed by: HOSPITALIST

## 2023-01-28 PROCEDURE — 96376 TX/PRO/DX INJ SAME DRUG ADON: CPT

## 2023-01-28 PROCEDURE — 63600175 PHARM REV CODE 636 W HCPCS: Mod: TB,JG | Performed by: PHYSICIAN ASSISTANT

## 2023-01-28 PROCEDURE — 63600175 PHARM REV CODE 636 W HCPCS: Performed by: HOSPITALIST

## 2023-01-28 PROCEDURE — 96372 THER/PROPH/DIAG INJ SC/IM: CPT | Performed by: PHYSICIAN ASSISTANT

## 2023-01-28 PROCEDURE — G0378 HOSPITAL OBSERVATION PER HR: HCPCS

## 2023-01-28 PROCEDURE — A4216 STERILE WATER/SALINE, 10 ML: HCPCS | Performed by: PHYSICIAN ASSISTANT

## 2023-01-28 PROCEDURE — 36415 COLL VENOUS BLD VENIPUNCTURE: CPT | Performed by: PHYSICIAN ASSISTANT

## 2023-01-28 PROCEDURE — 80048 BASIC METABOLIC PNL TOTAL CA: CPT | Performed by: PHYSICIAN ASSISTANT

## 2023-01-28 PROCEDURE — 25000003 PHARM REV CODE 250: Performed by: PHYSICIAN ASSISTANT

## 2023-01-28 PROCEDURE — 25000003 PHARM REV CODE 250: Performed by: HOSPITALIST

## 2023-01-28 PROCEDURE — 99233 PR SUBSEQUENT HOSPITAL CARE,LEVL III: ICD-10-PCS | Mod: ,,, | Performed by: HOSPITALIST

## 2023-01-28 PROCEDURE — 83735 ASSAY OF MAGNESIUM: CPT | Performed by: PHYSICIAN ASSISTANT

## 2023-01-28 RX ORDER — IBUPROFEN 200 MG
1 TABLET ORAL DAILY
Status: DISCONTINUED | OUTPATIENT
Start: 2023-01-28 | End: 2023-01-29 | Stop reason: HOSPADM

## 2023-01-28 RX ORDER — ALPRAZOLAM 0.5 MG/1
0.5 TABLET ORAL 2 TIMES DAILY PRN
Status: DISCONTINUED | OUTPATIENT
Start: 2023-01-28 | End: 2023-01-29 | Stop reason: HOSPADM

## 2023-01-28 RX ORDER — INSULIN ASPART 100 [IU]/ML
15 INJECTION, SOLUTION INTRAVENOUS; SUBCUTANEOUS
Status: DISCONTINUED | OUTPATIENT
Start: 2023-01-28 | End: 2023-01-29 | Stop reason: HOSPADM

## 2023-01-28 RX ORDER — METRONIDAZOLE 250 MG/1
500 TABLET ORAL EVERY 8 HOURS
Status: DISCONTINUED | OUTPATIENT
Start: 2023-01-28 | End: 2023-01-29 | Stop reason: HOSPADM

## 2023-01-28 RX ORDER — HYDROXYZINE HYDROCHLORIDE 25 MG/1
25 TABLET, FILM COATED ORAL ONCE
Status: COMPLETED | OUTPATIENT
Start: 2023-01-28 | End: 2023-01-28

## 2023-01-28 RX ADMIN — METHYLPREDNISOLONE SODIUM SUCCINATE 125 MG: 125 INJECTION, POWDER, FOR SOLUTION INTRAMUSCULAR; INTRAVENOUS at 04:01

## 2023-01-28 RX ADMIN — CEFEPIME 2 G: 2 INJECTION, POWDER, FOR SOLUTION INTRAVENOUS at 06:01

## 2023-01-28 RX ADMIN — METOPROLOL TARTRATE 50 MG: 50 TABLET, FILM COATED ORAL at 09:01

## 2023-01-28 RX ADMIN — METRONIDAZOLE 500 MG: 250 TABLET ORAL at 08:01

## 2023-01-28 RX ADMIN — INSULIN DETEMIR 35 UNITS: 100 INJECTION, SOLUTION SUBCUTANEOUS at 09:01

## 2023-01-28 RX ADMIN — INSULIN ASPART 10 UNITS: 100 INJECTION, SOLUTION INTRAVENOUS; SUBCUTANEOUS at 09:01

## 2023-01-28 RX ADMIN — METRONIDAZOLE 500 MG: 5 INJECTION, SOLUTION INTRAVENOUS at 01:01

## 2023-01-28 RX ADMIN — METHYLPREDNISOLONE SODIUM SUCCINATE 125 MG: 125 INJECTION, POWDER, FOR SOLUTION INTRAMUSCULAR; INTRAVENOUS at 03:01

## 2023-01-28 RX ADMIN — MELATONIN TAB 3 MG 6 MG: 3 TAB at 08:01

## 2023-01-28 RX ADMIN — ALPRAZOLAM 0.5 MG: 0.5 TABLET ORAL at 08:01

## 2023-01-28 RX ADMIN — CEFEPIME 2 G: 2 INJECTION, POWDER, FOR SOLUTION INTRAVENOUS at 09:01

## 2023-01-28 RX ADMIN — HEPARIN SODIUM 5000 UNITS: 5000 INJECTION INTRAVENOUS; SUBCUTANEOUS at 08:01

## 2023-01-28 RX ADMIN — CEFEPIME 2 G: 2 INJECTION, POWDER, FOR SOLUTION INTRAVENOUS at 12:01

## 2023-01-28 RX ADMIN — SPIRONOLACTONE 25 MG: 25 TABLET, FILM COATED ORAL at 09:01

## 2023-01-28 RX ADMIN — INSULIN ASPART 2 UNITS: 100 INJECTION, SOLUTION INTRAVENOUS; SUBCUTANEOUS at 06:01

## 2023-01-28 RX ADMIN — DIGOXIN 125 MCG: 125 TABLET ORAL at 09:01

## 2023-01-28 RX ADMIN — INSULIN ASPART 6 UNITS: 100 INJECTION, SOLUTION INTRAVENOUS; SUBCUTANEOUS at 12:01

## 2023-01-28 RX ADMIN — Medication 1 PATCH: at 09:01

## 2023-01-28 RX ADMIN — LAMIVUDINE 150 MG: 150 TABLET, FILM COATED ORAL at 09:01

## 2023-01-28 RX ADMIN — FOLIC ACID 1 MG: 1 TABLET ORAL at 09:01

## 2023-01-28 RX ADMIN — MORPHINE SULFATE 15 MG: 15 TABLET ORAL at 11:01

## 2023-01-28 RX ADMIN — INSULIN ASPART 15 UNITS: 100 INJECTION, SOLUTION INTRAVENOUS; SUBCUTANEOUS at 06:01

## 2023-01-28 RX ADMIN — HEPARIN SODIUM 5000 UNITS: 5000 INJECTION INTRAVENOUS; SUBCUTANEOUS at 03:01

## 2023-01-28 RX ADMIN — Medication 10 ML: at 03:01

## 2023-01-28 RX ADMIN — ALPRAZOLAM 0.5 MG: 0.5 TABLET ORAL at 06:01

## 2023-01-28 RX ADMIN — OXYCODONE HYDROCHLORIDE 20 MG: 5 TABLET ORAL at 09:01

## 2023-01-28 RX ADMIN — Medication 10 ML: at 10:01

## 2023-01-28 RX ADMIN — CEFEPIME 2 G: 2 INJECTION, POWDER, FOR SOLUTION INTRAVENOUS at 11:01

## 2023-01-28 RX ADMIN — HYDROXYZINE HYDROCHLORIDE 25 MG: 25 TABLET ORAL at 04:01

## 2023-01-28 RX ADMIN — THERA TABS 1 TABLET: TAB at 09:01

## 2023-01-28 RX ADMIN — METRONIDAZOLE 500 MG: 250 TABLET ORAL at 03:01

## 2023-01-28 RX ADMIN — OXYCODONE HYDROCHLORIDE 20 MG: 5 TABLET ORAL at 06:01

## 2023-01-28 RX ADMIN — METOPROLOL TARTRATE 50 MG: 50 TABLET, FILM COATED ORAL at 08:01

## 2023-01-28 RX ADMIN — INSULIN ASPART 10 UNITS: 100 INJECTION, SOLUTION INTRAVENOUS; SUBCUTANEOUS at 12:01

## 2023-01-28 RX ADMIN — Medication 10 ML: at 06:01

## 2023-01-28 RX ADMIN — HEPARIN SODIUM 5000 UNITS: 5000 INJECTION INTRAVENOUS; SUBCUTANEOUS at 05:01

## 2023-01-28 RX ADMIN — MORPHINE SULFATE 15 MG: 15 TABLET ORAL at 12:01

## 2023-01-28 RX ADMIN — ASPIRIN 81 MG CHEWABLE TABLET 81 MG: 81 TABLET CHEWABLE at 09:01

## 2023-01-28 NOTE — ASSESSMENT & PLAN NOTE
Smokes 1.5 ppd and used to smoke more.  Says he smokes due to anxiety.  Not really receptive to quitting, and he was a respiratory therapist at one time.  Accepted a nicotine patch.

## 2023-01-28 NOTE — ASSESSMENT & PLAN NOTE
"- Patient presented with tenderness, erythema and a "cyst" that is fluctuant in the groin  - Evaluated by urology, noted to have some mild erythema/induration along the surgical wound and a small cyst along the inner thigh.  Nothing operative.  Diagnosed with mild folliculitis  - he was previously treated in this facility, and discharged to LT[facility] for management of Mary's Gangrene   - Patient was prescribed Keflex by urgent care for possible scrotal cellulitis, looks OK currently   - started on Cefepime and Metronidazole upon admission, will continue this while he is being treated with steroids.  - Continue wound care.  - Doubt he will need more antibiotics on discharge.    "

## 2023-01-28 NOTE — ASSESSMENT & PLAN NOTE
Home medication Trintellix (vortioxetine), not on formulary.  Ordered as non-formulary request.  Patient very anxious and hydroxyzine was ineffective, Xanax ordered to treat severe anxiety until home medication available.  Says his psychiatrist is prescribing lorazepam 3 times weekly but [place] [facility] database does not reflect this.

## 2023-01-28 NOTE — CARE UPDATE
01/27/23 1931   Patient Assessment/Suction   Level of Consciousness (AVPU) alert   Respiratory Effort Normal;Unlabored   Expansion/Accessory Muscles/Retractions no retractions;expansion symmetric;no use of accessory muscles   All Lung Fields Breath Sounds diminished   Rhythm/Pattern, Respiratory no shortness of breath reported;pattern regular;depth regular;unlabored   PRE-TX-O2   Device (Oxygen Therapy) room air   SpO2 99 %   Pulse Oximetry Type Intermittent   $ Pulse Oximetry - Multiple Charge Pulse Oximetry - Multiple   Pulse 81   Resp 20   Temp 98.6 °F (37 °C)   /71

## 2023-01-28 NOTE — ASSESSMENT & PLAN NOTE
- last A1C:   Lab Results   Component Value Date    HGBA1C 7.6 (H) 11/15/2022   - hold oral antidiabetic meds   - Diabetic diet   - SSI with accuchecks AC/HS      Anti-hyperglycemic dose as follows-   Antihyperglycemics (From admission, onward)    Start     Stop Route Frequency Ordered    01/27/23 0900  insulin detemir U-100 pen 35 Units         -- SubQ 2 times daily 01/27/23 0053    01/27/23 0715  insulin aspart U-100 pen 10 Units         -- SubQ 3 times daily with meals 01/27/23 0053 01/27/23 0153  insulin aspart U-100 pen 1-10 Units         -- SubQ Before meals & nightly PRN 01/27/23 0053        Hold Oral hypoglycemics while patient is in the hospital.  - when questioned about his insulin doses he reported confusion but thinks he takes 40 units BID   - As BG going up with steroids will increase both basal and prandial insulin.

## 2023-01-28 NOTE — PLAN OF CARE
AAOX4. VSS. Anxious. One person assist per adls and transfers, voiding yellow urine per urinal. Able to make needs known. Tolerating diet and abx therapy well. HOB 30 degrees with call bell and bedside table within reach, bed in lowest position with hourly purposeful rounding. Will continue to monitor.   Problem: Adult Inpatient Plan of Care  Goal: Plan of Care Review  Outcome: Ongoing, Progressing  Goal: Patient-Specific Goal (Individualized)  Outcome: Ongoing, Progressing  Goal: Absence of Hospital-Acquired Illness or Injury  Outcome: Ongoing, Progressing  Goal: Optimal Comfort and Wellbeing  Outcome: Ongoing, Progressing  Goal: Readiness for Transition of Care  Outcome: Ongoing, Progressing     Problem: Diabetes Comorbidity  Goal: Blood Glucose Level Within Targeted Range  Outcome: Ongoing, Progressing     Problem: Impaired Wound Healing  Goal: Optimal Wound Healing  Outcome: Ongoing, Progressing

## 2023-01-28 NOTE — SUBJECTIVE & OBJECTIVE
Interval History: He was very anxious this AM.  Was off his antidepressant as it is non-formulary, ordered.  Xanax given with improvement.  Says he is on lorazepam 3 times daily but not on [place] [facility] database.  He has had excellent improvement with starting IV steroids.    Review of Systems   Constitutional:  Negative for chills and fever.   Respiratory:  Negative for cough and shortness of breath.    Cardiovascular:  Negative for chest pain and palpitations.   Musculoskeletal:  Positive for arthralgias.   Objective:     Vital Signs (Most Recent):  Temp: 99.4 °F (37.4 °C) (01/28/23 1143)  Pulse: 74 (01/28/23 1143)  Resp: 18 (01/28/23 1242)  BP: 127/75 (01/28/23 1143)  SpO2: 96 % (01/28/23 1143) Vital Signs (24h Range):  Temp:  [96.1 °F (35.6 °C)-99.4 °F (37.4 °C)] 99.4 °F (37.4 °C)  Pulse:  [] 74  Resp:  [16-20] 18  SpO2:  [93 %-99 %] 96 %  BP: (127-148)/(70-85) 127/75     Weight: 95.3 kg (210 lb)  Body mass index is 29.3 kg/m².    Intake/Output Summary (Last 24 hours) at 1/28/2023 1608  Last data filed at 1/28/2023 0239  Gross per 24 hour   Intake 1828.28 ml   Output --   Net 1828.28 ml      Physical Exam  Constitutional:       Comments: Chronically ill-appearing.   Cardiovascular:      Rate and Rhythm: Normal rate and regular rhythm.      Pulses: Normal pulses.      Heart sounds: Normal heart sounds. No murmur heard.    No gallop.   Pulmonary:      Effort: Pulmonary effort is normal.      Breath sounds: Normal breath sounds.   Abdominal:      General: Bowel sounds are normal.      Palpations: Abdomen is soft.   Genitourinary:     Comments: No erythema or drainage noted surgical scar scrotum.  Musculoskeletal:      Comments: Joint swelling left wrist/hand improving, also improved range of motion right shoulder, hip.   Skin:     General: Skin is warm and dry.      Comments: Multiple abrasions bridge of nose.   Neurological:      General: No focal deficit present.      Mental Status: He is alert and oriented  to person, place, and time.       Significant Labs: All pertinent labs within the past 24 hours have been reviewed.    Significant Imaging: I have reviewed all pertinent imaging results/findings within the past 24 hours.

## 2023-01-28 NOTE — PROGRESS NOTES
"[facility] - [facility] Surg [address] Medicine  Progress Note    Patient Name: Nithin Wagner  MRN: [id number]  Patient Class: OP- Observation   Admission Date: 1/26/2023  Length of Stay: 0 days  Attending Physician: Hetal Harper MD  Primary Care Provider: Tushar Drew MD        Subjective:     Principal Problem:Rheumatoid arthritis flare        HPI:  From H&P by Marcia Vergara PA:  ". Nithin Wagner is a 67 y.o. male, with PMH of Mary's gangrene, T2DM, obesity, CAD, CHF, HTN, RA, PVD, who presented to [facility] ED on 1/26/23 due to concern for infection in the groin as he started with groin pain 1 week ago. He notes associated chills, fever, abdominal pain in the ED. Upon admission exam he focuses mainly on the pain in his joints, specifically his left wrist. He states that he had previously been treated for a groin infection, and had surgery of the scrotum for gangrene. Afterwards he was at [facility], and was discharged one week ago. He had a fall, after which his scrotum was tender, and then one week ago he noticed a cyst. His home health nurse visited him today, and kirk blood work. Afterwards he developed a fever. He was evaluated in the ED with labs showing no leukocytosis or left shift on CBC. A metabolic panel showed elevated glucose of 229 without evidence for DKA. A CT of the pelvis showed skin and soft tissue thickening of the perineum and scrotum with mild associated subcutaneous stranding or inflammatory change. He was treated in the ED with pain medication."      Overview/Hospital Course:  Patient was admitted for treatment of presumed cellulitis and a flare of rheumatoid arthritis.  He had severe pain in multiple joints, left wrist, right shoulder and hip.  Urology and wound care evaluated his scrotum and noted some partial thickness skin loss along the healing surgical incision consistent with folliculitis, no drainage noted.  Barrier ointment recommended.  He was continued on antibiotics " and rheumatology was contacted regarding the flare of RA.  Recommended methylprednisolone 125 mg bid if infection ruled out, started medication in the evening 1/27.  He had very good improvement in RA symptoms with steroids, pain and swelling in left hand resolved by the following day.      Interval History: He was very anxious this AM.  Was off his antidepressant as it is non-formulary, ordered.  Xanax given with improvement.  Says he is on lorazepam 3 times daily but not on [place] [facility] database.  He has had excellent improvement with starting IV steroids.    Review of Systems   Constitutional:  Negative for chills and fever.   Respiratory:  Negative for cough and shortness of breath.    Cardiovascular:  Negative for chest pain and palpitations.   Musculoskeletal:  Positive for arthralgias.   Objective:     Vital Signs (Most Recent):  Temp: 99.4 °F (37.4 °C) (01/28/23 1143)  Pulse: 74 (01/28/23 1143)  Resp: 18 (01/28/23 1242)  BP: 127/75 (01/28/23 1143)  SpO2: 96 % (01/28/23 1143) Vital Signs (24h Range):  Temp:  [96.1 °F (35.6 °C)-99.4 °F (37.4 °C)] 99.4 °F (37.4 °C)  Pulse:  [] 74  Resp:  [16-20] 18  SpO2:  [93 %-99 %] 96 %  BP: (127-148)/(70-85) 127/75     Weight: 95.3 kg (210 lb)  Body mass index is 29.3 kg/m².    Intake/Output Summary (Last 24 hours) at 1/28/2023 1608  Last data filed at 1/28/2023 0239  Gross per 24 hour   Intake 1828.28 ml   Output --   Net 1828.28 ml      Physical Exam  Constitutional:       Comments: Chronically ill-appearing.   Cardiovascular:      Rate and Rhythm: Normal rate and regular rhythm.      Pulses: Normal pulses.      Heart sounds: Normal heart sounds. No murmur heard.    No gallop.   Pulmonary:      Effort: Pulmonary effort is normal.      Breath sounds: Normal breath sounds.   Abdominal:      General: Bowel sounds are normal.      Palpations: Abdomen is soft.   Genitourinary:     Comments: No erythema or drainage noted surgical scar scrotum.  Musculoskeletal:       "Comments: Joint swelling left wrist/hand improving, also improved range of motion right shoulder, hip.   Skin:     General: Skin is warm and dry.      Comments: Multiple abrasions bridge of nose.   Neurological:      General: No focal deficit present.      Mental Status: He is alert and oriented to person, place, and time.       Significant Labs: All pertinent labs within the past 24 hours have been reviewed.    Significant Imaging: I have reviewed all pertinent imaging results/findings within the past 24 hours.      Assessment/Plan:      * Rheumatoid arthritis flare  Chief complaint severe pain and decreased mobility of multiple joints, especially the left hand/wrist which was markedly swollen on presentation.  Has pain and stiffness in right should and hip as well.  Off chronic steroids for a few months since he was admitted (November) with Mary's.  Discussed with Dr. Alcala and IV steroids recommended starting with solu-medrol 125 mg bid.  He has an appointment on Monday, likely will discharge tomorrow on oral steroids.      Folliculitis  - Patient presented with tenderness, erythema and a "cyst" that is fluctuant in the groin  - Evaluated by urology, noted to have some mild erythema/induration along the surgical wound and a small cyst along the inner thigh.  Nothing operative.  Diagnosed with mild folliculitis  - he was previously treated in this facility, and discharged to LT[facility] for management of Mary's Gangrene   - Patient was prescribed Keflex by urgent care for possible scrotal cellulitis, looks OK currently   - started on Cefepime and Metronidazole upon admission, will continue this while he is being treated with steroids.  - Continue wound care.  - Doubt he will need more antibiotics on discharge.      Anxiety and depression  Home medication Trintellix (vortioxetine), not on formulary.  Ordered as non-formulary request.  Patient very anxious and hydroxyzine was ineffective, Xanax ordered to treat " severe anxiety until home medication available.  Says his psychiatrist is prescribing lorazepam 3 times weekly but [place] [facility] database does not reflect this.      Rheumatoid arthritis involving multiple sites with positive rheumatoid factor  Rheumatoid arthritis flare  - Followed by Dr. Alcala, patient did not tolerate Enbrel  - Kevzara injections planned, held for Mary's treatment.  - Patient weaned off steroids several months ago  - No MTX given MARI and baseline thrombocytopenia, poor lung reserve  - Continue folic acid 1 mg po qday   - Patient was on lamivudine for positive hepatitis B core Ab and is supposed to follow up with GI  Follow up with GI  - has upcoming appointment 1/30/23  - PRN pain medications ordered     Chronic diastolic heart failure  - Appears compensated   - home meds: furosemide 40 mg QD PRN, metoprolol tartrate 50 mg BID, spironolactone 25 mg QD   - monitor I&Os and daily weights   - last Echo 10/28/22 EF 55%   Summary   The left ventricle is normal in size with concentric hypertrophy and normal systolic function.   Grade I left ventricular diastolic dysfunction.   Normal right ventricular size with normal right ventricular systolic function.   Thickened and calcified posterior mitral valve leaflet.        Coronary artery disease  - continue ASA 81 mg QD, ezetimibe 10 mg QD   - s/p stenting of RCA       Tobacco use disorder  Smokes 1.5 ppd and used to smoke more.  Says he smokes due to anxiety.  Not really receptive to quitting, and he was a respiratory therapist at one time.  Accepted a nicotine patch.      Type 2 diabetes mellitus with hyperglycemia, with long-term current use of insulin  - last A1C:   Lab Results   Component Value Date    HGBA1C 7.6 (H) 11/15/2022   - hold oral antidiabetic meds   - Diabetic diet   - SSI with accuchecks AC/HS      Anti-hyperglycemic dose as follows-   Antihyperglycemics (From admission, onward)    Start     Stop Route Frequency Ordered     01/27/23 0900  insulin detemir U-100 pen 35 Units         -- SubQ 2 times daily 01/27/23 0053 01/27/23 0715  insulin aspart U-100 pen 10 Units         -- SubQ 3 times daily with meals 01/27/23 0053 01/27/23 0153  insulin aspart U-100 pen 1-10 Units         -- SubQ Before meals & nightly PRN 01/27/23 0053        Hold Oral hypoglycemics while patient is in the hospital.  - when questioned about his insulin doses he reported confusion but thinks he takes 40 units BID   - As BG going up with steroids will increase both basal and prandial insulin.    Essential hypertension  - chronic   - normotensive at present   - home meds: metoprolol & spironolactone w/ PRN furosemide   - monitor       VTE Risk Mitigation (From admission, onward)         Ordered     heparin (porcine) injection 5,000 Units  Every 8 hours         01/27/23 0032     IP VTE HIGH RISK PATIENT  Once         01/27/23 0032     Place sequential compression device  Until discontinued         01/27/23 0032                  Hetal Infante MD  Department of Hospital Medicine   [facility] ([address])

## 2023-01-28 NOTE — ASSESSMENT & PLAN NOTE
Chief complaint severe pain and decreased mobility of multiple joints, especially the left hand/wrist which was markedly swollen on presentation.  Has pain and stiffness in right should and hip as well.  Off chronic steroids for a few months since he was admitted (November) with Jose F.  Discussed with Dr. Alcala and IV steroids recommended starting with solu-medrol 125 mg bid.  He has an appointment on Monday, likely will discharge tomorrow on oral steroids.

## 2023-01-29 VITALS
HEART RATE: 87 BPM | WEIGHT: 210 LBS | RESPIRATION RATE: 18 BRPM | DIASTOLIC BLOOD PRESSURE: 86 MMHG | HEIGHT: 71 IN | TEMPERATURE: 98 F | BODY MASS INDEX: 29.4 KG/M2 | SYSTOLIC BLOOD PRESSURE: 153 MMHG | OXYGEN SATURATION: 93 %

## 2023-01-29 LAB
ANION GAP SERPL CALC-SCNC: 9 MMOL/L (ref 8–16)
BASOPHILS # BLD AUTO: 0.02 K/UL (ref 0–0.2)
BASOPHILS NFR BLD: 0.3 % (ref 0–1.9)
BUN SERPL-MCNC: 20 MG/DL (ref 8–23)
CALCIUM SERPL-MCNC: 8.5 MG/DL (ref 8.7–10.5)
CHLORIDE SERPL-SCNC: 102 MMOL/L (ref 95–110)
CO2 SERPL-SCNC: 25 MMOL/L (ref 23–29)
CREAT SERPL-MCNC: 0.9 MG/DL (ref 0.5–1.4)
DIFFERENTIAL METHOD: ABNORMAL
EOSINOPHIL # BLD AUTO: 0 K/UL (ref 0–0.5)
EOSINOPHIL NFR BLD: 0.3 % (ref 0–8)
ERYTHROCYTE [DISTWIDTH] IN BLOOD BY AUTOMATED COUNT: 11.7 % (ref 11.5–14.5)
EST. GFR  (NO RACE VARIABLE): >60 ML/MIN/1.73 M^2
GLUCOSE SERPL-MCNC: 234 MG/DL (ref 70–110)
HCT VFR BLD AUTO: 35.4 % (ref 40–54)
HGB BLD-MCNC: 11.7 G/DL (ref 14–18)
IMM GRANULOCYTES # BLD AUTO: 0.06 K/UL (ref 0–0.04)
IMM GRANULOCYTES NFR BLD AUTO: 0.8 % (ref 0–0.5)
LYMPHOCYTES # BLD AUTO: 1.1 K/UL (ref 1–4.8)
LYMPHOCYTES NFR BLD: 15.2 % (ref 18–48)
MAGNESIUM SERPL-MCNC: 2.4 MG/DL (ref 1.6–2.6)
MCH RBC QN AUTO: 31.1 PG (ref 27–31)
MCHC RBC AUTO-ENTMCNC: 33.1 G/DL (ref 32–36)
MCV RBC AUTO: 94 FL (ref 82–98)
MONOCYTES # BLD AUTO: 0.4 K/UL (ref 0.3–1)
MONOCYTES NFR BLD: 5.6 % (ref 4–15)
NEUTROPHILS # BLD AUTO: 5.7 K/UL (ref 1.8–7.7)
NEUTROPHILS NFR BLD: 77.8 % (ref 38–73)
NRBC BLD-RTO: 0 /100 WBC
PLATELET # BLD AUTO: 308 K/UL (ref 150–450)
PMV BLD AUTO: 10.9 FL (ref 9.2–12.9)
POCT GLUCOSE: 241 MG/DL (ref 70–110)
POTASSIUM SERPL-SCNC: 4.3 MMOL/L (ref 3.5–5.1)
RBC # BLD AUTO: 3.76 M/UL (ref 4.6–6.2)
SODIUM SERPL-SCNC: 136 MMOL/L (ref 136–145)
WBC # BLD AUTO: 7.29 K/UL (ref 3.9–12.7)

## 2023-01-29 PROCEDURE — 96376 TX/PRO/DX INJ SAME DRUG ADON: CPT

## 2023-01-29 PROCEDURE — 99238 PR HOSPITAL DISCHARGE DAY,<30 MIN: ICD-10-PCS | Mod: ,,, | Performed by: HOSPITALIST

## 2023-01-29 PROCEDURE — 25000003 PHARM REV CODE 250: Performed by: PHYSICIAN ASSISTANT

## 2023-01-29 PROCEDURE — S4991 NICOTINE PATCH NONLEGEND: HCPCS | Performed by: HOSPITALIST

## 2023-01-29 PROCEDURE — 80048 BASIC METABOLIC PNL TOTAL CA: CPT | Performed by: PHYSICIAN ASSISTANT

## 2023-01-29 PROCEDURE — G0378 HOSPITAL OBSERVATION PER HR: HCPCS

## 2023-01-29 PROCEDURE — A4216 STERILE WATER/SALINE, 10 ML: HCPCS | Performed by: PHYSICIAN ASSISTANT

## 2023-01-29 PROCEDURE — 96372 THER/PROPH/DIAG INJ SC/IM: CPT | Performed by: PHYSICIAN ASSISTANT

## 2023-01-29 PROCEDURE — 63600175 PHARM REV CODE 636 W HCPCS: Performed by: HOSPITALIST

## 2023-01-29 PROCEDURE — 63600175 PHARM REV CODE 636 W HCPCS: Performed by: PHYSICIAN ASSISTANT

## 2023-01-29 PROCEDURE — 25000003 PHARM REV CODE 250: Performed by: HOSPITALIST

## 2023-01-29 PROCEDURE — 36415 COLL VENOUS BLD VENIPUNCTURE: CPT | Performed by: PHYSICIAN ASSISTANT

## 2023-01-29 PROCEDURE — 85025 COMPLETE CBC W/AUTO DIFF WBC: CPT | Performed by: PHYSICIAN ASSISTANT

## 2023-01-29 PROCEDURE — 99238 HOSP IP/OBS DSCHRG MGMT 30/<: CPT | Mod: ,,, | Performed by: HOSPITALIST

## 2023-01-29 PROCEDURE — 83735 ASSAY OF MAGNESIUM: CPT | Performed by: PHYSICIAN ASSISTANT

## 2023-01-29 PROCEDURE — 96366 THER/PROPH/DIAG IV INF ADDON: CPT

## 2023-01-29 RX ORDER — OXYCODONE HYDROCHLORIDE 10 MG/1
10 TABLET ORAL EVERY 4 HOURS PRN
COMMUNITY
End: 2023-03-01

## 2023-01-29 RX ORDER — SULFAMETHOXAZOLE AND TRIMETHOPRIM 800; 160 MG/1; MG/1
1 TABLET ORAL 2 TIMES DAILY
Qty: 14 TABLET | Refills: 0 | Status: SHIPPED | OUTPATIENT
Start: 2023-01-29 | End: 2023-02-05

## 2023-01-29 RX ORDER — PREDNISONE 20 MG/1
40 TABLET ORAL 2 TIMES DAILY
Start: 2023-01-29 | End: 2023-03-01

## 2023-01-29 RX ADMIN — ASPIRIN 81 MG CHEWABLE TABLET 81 MG: 81 TABLET CHEWABLE at 09:01

## 2023-01-29 RX ADMIN — HEPARIN SODIUM 5000 UNITS: 5000 INJECTION INTRAVENOUS; SUBCUTANEOUS at 05:01

## 2023-01-29 RX ADMIN — THERA TABS 1 TABLET: TAB at 09:01

## 2023-01-29 RX ADMIN — MORPHINE SULFATE 15 MG: 15 TABLET ORAL at 04:01

## 2023-01-29 RX ADMIN — METHYLPREDNISOLONE SODIUM SUCCINATE 125 MG: 125 INJECTION, POWDER, FOR SOLUTION INTRAMUSCULAR; INTRAVENOUS at 02:01

## 2023-01-29 RX ADMIN — INSULIN ASPART 4 UNITS: 100 INJECTION, SOLUTION INTRAVENOUS; SUBCUTANEOUS at 09:01

## 2023-01-29 RX ADMIN — DIGOXIN 125 MCG: 125 TABLET ORAL at 09:01

## 2023-01-29 RX ADMIN — FOLIC ACID 1 MG: 1 TABLET ORAL at 09:01

## 2023-01-29 RX ADMIN — Medication 1 PATCH: at 09:01

## 2023-01-29 RX ADMIN — METRONIDAZOLE 500 MG: 250 TABLET ORAL at 05:01

## 2023-01-29 RX ADMIN — OXYCODONE HYDROCHLORIDE 20 MG: 5 TABLET ORAL at 09:01

## 2023-01-29 RX ADMIN — METOPROLOL TARTRATE 50 MG: 50 TABLET, FILM COATED ORAL at 09:01

## 2023-01-29 RX ADMIN — LAMIVUDINE 150 MG: 150 TABLET, FILM COATED ORAL at 09:01

## 2023-01-29 RX ADMIN — Medication 10 ML: at 06:01

## 2023-01-29 RX ADMIN — SPIRONOLACTONE 25 MG: 25 TABLET, FILM COATED ORAL at 09:01

## 2023-01-29 RX ADMIN — CEFEPIME 2 G: 2 INJECTION, POWDER, FOR SOLUTION INTRAVENOUS at 09:01

## 2023-01-29 RX ADMIN — INSULIN ASPART 15 UNITS: 100 INJECTION, SOLUTION INTRAVENOUS; SUBCUTANEOUS at 07:01

## 2023-01-29 NOTE — CARE UPDATE
01/28/23 2000   Patient Assessment/Suction   Level of Consciousness (AVPU) alert   Respiratory Effort Normal;Unlabored   Expansion/Accessory Muscles/Retractions expansion symmetric;no retractions;no use of accessory muscles   All Lung Fields Breath Sounds diminished   Rhythm/Pattern, Respiratory no shortness of breath reported   PRE-TX-O2   Device (Oxygen Therapy) room air   SpO2 95 %   Pulse Oximetry Type Intermittent   $ Pulse Oximetry - Multiple Charge Pulse Oximetry - Multiple   Pulse 81   Resp 20   Positioning   Body Position position changed independently   Head of Bed (HOB) Positioning HOB at 30 degrees   Positioning/Transfer Devices pillows

## 2023-01-29 NOTE — PLAN OF CARE
MSW met with the patient at the bedside.     Patient is alert and oriented with no communication barriers.     Prior to admission patient is independent. Patient reported the use of HH ([facility]) & DME ([facility]).     Patients PCP is correct on the face sheet.     Patient denies having written advance directives.      Patients family will transport the patient home at discharge.     No CM needs identified at this time.     CM team will continue to follow.              01/29/23 4987   Discharge Assessment   Assessment Type Discharge Planning Assessment   Confirmed/corrected address, phone number and insurance Yes   Confirmed Demographics Correct on Facesheet   Source of Information patient;health record   People in Home alone   Do you expect to return to your current living situation? No   Do you have help at home or someone to help you manage your care at home? Yes   Who are your caregiver(s) and their phone number(s)? Family   Prior to hospitilization cognitive status: Alert/Oriented   Current cognitive status: Alert/Oriented   Walking or Climbing Stairs ambulation difficulty, requires equipment   Equipment Currently Used at Home [facility]   Readmission within 30 days? No   Patient currently being followed by outpatient case management? No   Do you currently have service(s) that help you manage your care at home? Yes   Name and Contact number of agency [facility]   Is the pt/caregiver preference to resume services with current agency Yes   Do you take prescription medications? Yes   Do you have prescription coverage? Yes   Do you have any problems affording any of your prescribed medications? No   Is the patient taking medications as prescribed? yes   How do you get to doctors appointments? family or friend will provide   Are you on dialysis? No   Do you take coumadin? No   Discharge Plan A Home   Discharge Plan B Home Health   Discharge Plan discussed with: Patient   Discharge Barriers  Identified None

## 2023-01-29 NOTE — PLAN OF CARE
01/29/23 0829   Final Note   Assessment Type Final Discharge Note   Anticipated Discharge Disposition Home-Health   Post-Acute Status   Discharge Delays None known at this time       Patient will discharge home. Patient will resume home health with [facility].     Patient family will provide transportation home.     All discharge needs addressed from a  standpoint.

## 2023-01-29 NOTE — DISCHARGE SUMMARY
"[facility] Surg [address] Medicine  Discharge Summary      Patient Name: Nithin Wagner  MRN: [id number]  ALLI: [id number]  Patient Class: OP- Observation  Admission Date: 1/26/2023  Hospital Length of Stay: 0 days  Discharge Date and Time:  01/29/2023 8:13 AM  Attending Physician: Hetal Harper MD   Discharging Provider: Hetal Harper MD  Primary Care Provider: Tushar Drew MD    Primary Care Team: Networked reference to record PCT     HPI:   From H&P by Marcia Vergara PA:  "Mr. Nithin aWgner is a 67 y.o. male, with PMH of Mary's gangrene, T2DM, obesity, CAD, CHF, HTN, RA, PVD, who presented [place] [facility] on [place]26/23 due to concern for infection in the groin as he started with groin pain 1 week ago. He notes associated chills, fever, abdominal pain in the ED. Upon admission exam he focuses mainly on the pain in his joints, specifically his left wrist. He states that he had previously been treated for a groin infection, and had surgery of the scrotum for gangrene. Afterwards he was at [facility], and was discharged one week ago. He had a fall, after which his scrotum was tender, and then one week ago he noticed a cyst. His home health nurse visited him today, and kirk blood work. Afterwards he developed a fever. He was evaluated in the ED with labs showing no leukocytosis or left shift on CBC. A metabolic panel showed elevated glucose of 229 without evidence for DKA. A CT of the pelvis showed skin and soft tissue thickening of the perineum and scrotum with mild associated subcutaneous stranding or inflammatory change. He was treated in the ED with pain medication."            Hospital Course:   Patient was admitted for treatment of presumed cellulitis and a flare of rheumatoid arthritis.  He had severe pain in multiple joints, left wrist, right shoulder and hip.  Urology and wound care evaluated his scrotum and noted some partial thickness skin loss along the healing surgical incision, no " drainage noted.  Urology evaluated the wound and diagnosed mild folliculitis.  Barrier ointment was recommended.  He was continued on antibiotics and rheumatology was contacted regarding the flare of RA.  Recommended methylprednisolone 125 mg bid if infection ruled out, started medication in the evening 1/27.  He had very good improvement in RA symptoms with steroids, and pain and swelling in his left hand resolved by the following day.    Patient has plenty of prednisone at home so was instructed to start taking 40 mg twice daily.  He has follow up scheduled with rheumatology on 1/30/23 (tomorrow) and any changes to his regimen may be made during his follow up appointment.  For folliculitis he was prescribed one week of Bactrim DS.  His blood glucose was elevated on methylprednisolone and he required increased insulin dosing while he was here.  He has follow up appointments already scheduled in the coming week with endocrinology as well as ID.  Patient was feeling well on discharge with joint swelling resolved and pain much improved.        Goals of Care Treatment Preferences:  Code Status: Full Code      Consults:   Consults (From admission, onward)        Status Ordering Provider     Inpatient consult to Urology  Once        Provider:  (Not yet assigned)    Completed LEELEE GOMEZ     IP consult to case management  Once        Provider:  (Not yet assigned)    Acknowledged CAROLYN SOLO            Final Active Diagnoses:    Diagnosis Date Noted POA    PRINCIPAL PROBLEM:  Rheumatoid arthritis flare [M06.9] 07/12/2021 Yes    Folliculitis [L73.9] 01/27/2023 Yes    Anxiety and depression [F41.9, F32.A] 01/28/2023 Yes    Rheumatoid arthritis involving multiple sites with positive rheumatoid factor [M05.79] 01/27/2023 Yes    Chronic diastolic heart failure [I50.32] 11/16/2022 Yes    Coronary artery disease [I25.10] 03/23/2021 Yes    Tobacco use disorder [F17.200] 03/10/2021 Yes    Type 2 diabetes  mellitus with hyperglycemia, with long-term current use of insulin [E11.65, Z79.4] 11/12/2020 Not Applicable    Essential hypertension [I10] 11/12/2020 Yes      Problems Resolved During this Admission:    Diagnosis Date Noted Date Resolved POA    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    Specialty: Internal Medicine  Why: Follow up in 1-2 weeks  Contact information:  [address] [facility] [address]  [phone]             Korin Alcala MD Follow up.    Specialty: Rheumatology  Why: Follow up tomorrow as scheduled.  Contact information:  [address]  [address]  [place] [address] [phone]                       Patient Instructions:      Diet diabetic     Activity as tolerated        Medications:  Reconciled Home Medications:      Medication List      CHANGE how you take these medications    predniSONE 20 MG tablet  Commonly known as: DELTASONE  Take 2 tablets (40 mg total) by mouth 2 (two) times daily.  What changed:   · medication strength  · how much to take  · when to take this        CONTINUE taking these medications    ascorbic acid (vitamin C) 500 MG tablet  Commonly known as: VITAMIN C  Take 1 tablet (500 mg total) by mouth once daily.     aspirin 81 MG Chew  Take 81 mg by mouth.     blood sugar diagnostic Strp  To check BG 3 times daily, to use with insurance preferred meter     blood-glucose meter kit  Use to check glucose as directed     digoxin 125 mcg tablet  Commonly known as: LANOXIN  Take 1 tablet (125 mcg total) by mouth once daily.     ergocalciferol 50,000 unit Cap  Commonly known as: ERGOCALCIFEROL  Take 1 capsule (50,000 Units total) by mouth every 7 days.     ezetimibe 10 mg tablet  Commonly known as: ZETIA  Take 1 tablet (10 mg total) by mouth once daily.     folic acid 1 MG tablet  Commonly known as: FOLVITE  Take  "1 tablet (1 mg total) by mouth once daily.     furosemide 40 MG tablet  Commonly known as: LASIX  Take 1 tablet (40 mg total) by mouth daily as needed (fluid).     insulin admin supplies Inpn  InPen device, use to inject mealtime insulin 3 times daily. 1 pen to use, 1 for backup as needed.     lamiVUDine 150 MG Tab  Commonly known as: EPIVIR  TAKE 1 TABLET BY MOUTH EVERY DAY     LINZESS 72 mcg Cap capsule  Generic drug: linaCLOtide     metoprolol tartrate 50 MG tablet  Commonly known as: LOPRESSOR  Take 1 tablet (50 mg total) by mouth every 12 (twelve) hours.     multivitamin per tablet  Commonly known as: THERAGRAN  Take 1 tablet by mouth once daily.     nitroGLYCERIN 0.4 MG SL tablet  Commonly known as: NITROSTAT  Place 1 tablet (0.4 mg total) under the tongue every 5 (five) minutes as needed for Chest pain.     NovoLOG PenFill U-100 Insulin 100 unit/mL Crtg  Generic drug: insulin aspart U-100  INJECT 14 UNITS INTO THE SKIN THREE TIMES A DAY WITH MEALS.     OMNIPAQUE 350 350 mg iodine/mL Soln injection  Generic drug: iohexoL     ondansetron 4 MG tablet  Commonly known as: ZOFRAN  Take 1 tablet (4 mg total) by mouth every 8 (eight) hours as needed for Nausea.     oxyCODONE 10 mg Tab immediate release tablet  Commonly known as: ROXICODONE  Take 10 mg by mouth every 4 (four) hours as needed for Pain.     pen needle, diabetic 32 gauge x 5/32" Ndle  Commonly known as: BD ULTRA-FINE RAHUL PEN NEEDLE  Use to inject insulin 5 times daily     potassium chloride 10 MEQ Cpsr  Commonly known as: MICRO-K  Take 10 mEq by mouth once daily.     PROAIR HFA 90 mcg/actuation inhaler  Generic drug: albuterol     senna-docusate 8.6-50 mg 8.6-50 mg per tablet  Commonly known as: PERICOLACE  Take 1 tablet by mouth 2 (two) times daily.     spironolactone 25 MG tablet  Commonly known as: ALDACTONE  TAKE 1 TABLET BY MOUTH EVERY DAY     sulfamethoxazole-trimethoprim 800-160mg 800-160 mg Tab  Commonly known as: BACTRIM DS  Take 1 tablet by " mouth 2 (two) times daily. for 7 days     TRESIBA FLEXTOUCH U-200 200 unit/mL (3 mL) insulin pen  Generic drug: insulin degludec  Inject 50 Units into the skin 2 (two) times a day.     vortioxetine 10 mg Tab  Commonly known as: TRINTELLIX  Take 1 tablet (10 mg total) by mouth once daily. Take at 6am     zinc sulfate 50 mg zinc (220 mg) capsule  Commonly known as: ZINCATE  Take 1 capsule (220 mg total) by mouth once daily. for 14 days        STOP taking these medications    cephALEXin 500 MG capsule  Commonly known as: KEFLEX            Time spent on the discharge of patient: <30 minutes         Hetal Infante MD  Department of Hospital Medicine  [facility] ([address])

## 2023-01-30 ENCOUNTER — OFFICE VISIT (OUTPATIENT)
Dept: RHEUMATOLOGY | Facility: CLINIC | Age: 68
End: 2023-01-30
Payer: MEDICARE

## 2023-01-30 VITALS
HEIGHT: 70 IN | HEART RATE: 99 BPM | DIASTOLIC BLOOD PRESSURE: 77 MMHG | SYSTOLIC BLOOD PRESSURE: 121 MMHG | BODY MASS INDEX: 31.02 KG/M2 | WEIGHT: 216.69 LBS

## 2023-01-30 DIAGNOSIS — M06.9 RHEUMATOID ARTHRITIS INVOLVING MULTIPLE JOINTS: Primary | ICD-10-CM

## 2023-01-30 PROCEDURE — 1159F PR MEDICATION LIST DOCUMENTED IN MEDICAL RECORD: ICD-10-PCS | Mod: CPTII,S$GLB,, | Performed by: INTERNAL MEDICINE

## 2023-01-30 PROCEDURE — 1125F AMNT PAIN NOTED PAIN PRSNT: CPT | Mod: CPTII,S$GLB,, | Performed by: INTERNAL MEDICINE

## 2023-01-30 PROCEDURE — 99999 PR PBB SHADOW E&M-EST. PATIENT-LVL IV: CPT | Mod: PBBFAC,,, | Performed by: INTERNAL MEDICINE

## 2023-01-30 PROCEDURE — 99215 PR OFFICE/OUTPT VISIT, EST, LEVL V, 40-54 MIN: ICD-10-PCS | Mod: S$GLB,,, | Performed by: INTERNAL MEDICINE

## 2023-01-30 PROCEDURE — 3074F PR MOST RECENT SYSTOLIC BLOOD PRESSURE < 130 MM HG: ICD-10-PCS | Mod: CPTII,S$GLB,, | Performed by: INTERNAL MEDICINE

## 2023-01-30 PROCEDURE — 1111F PR DISCHARGE MEDS RECONCILED W/ CURRENT OUTPATIENT MED LIST: ICD-10-PCS | Mod: CPTII,S$GLB,, | Performed by: INTERNAL MEDICINE

## 2023-01-30 PROCEDURE — 99999 PR PBB SHADOW E&M-EST. PATIENT-LVL IV: ICD-10-PCS | Mod: PBBFAC,,, | Performed by: INTERNAL MEDICINE

## 2023-01-30 PROCEDURE — 1125F PR PAIN SEVERITY QUANTIFIED, PAIN PRESENT: ICD-10-PCS | Mod: CPTII,S$GLB,, | Performed by: INTERNAL MEDICINE

## 2023-01-30 PROCEDURE — 1111F DSCHRG MED/CURRENT MED MERGE: CPT | Mod: CPTII,S$GLB,, | Performed by: INTERNAL MEDICINE

## 2023-01-30 PROCEDURE — 3074F SYST BP LT 130 MM HG: CPT | Mod: CPTII,S$GLB,, | Performed by: INTERNAL MEDICINE

## 2023-01-30 PROCEDURE — 99499 RISK ADDL DX/OHS AUDIT: ICD-10-PCS | Mod: S$PBB,,, | Performed by: INTERNAL MEDICINE

## 2023-01-30 PROCEDURE — 3078F PR MOST RECENT DIASTOLIC BLOOD PRESSURE < 80 MM HG: ICD-10-PCS | Mod: CPTII,S$GLB,, | Performed by: INTERNAL MEDICINE

## 2023-01-30 PROCEDURE — 99215 OFFICE O/P EST HI 40 MIN: CPT | Mod: S$GLB,,, | Performed by: INTERNAL MEDICINE

## 2023-01-30 PROCEDURE — 1159F MED LIST DOCD IN RCRD: CPT | Mod: CPTII,S$GLB,, | Performed by: INTERNAL MEDICINE

## 2023-01-30 PROCEDURE — 3008F BODY MASS INDEX DOCD: CPT | Mod: CPTII,S$GLB,, | Performed by: INTERNAL MEDICINE

## 2023-01-30 PROCEDURE — 3008F PR BODY MASS INDEX (BMI) DOCUMENTED: ICD-10-PCS | Mod: CPTII,S$GLB,, | Performed by: INTERNAL MEDICINE

## 2023-01-30 PROCEDURE — 3078F DIAST BP <80 MM HG: CPT | Mod: CPTII,S$GLB,, | Performed by: INTERNAL MEDICINE

## 2023-01-30 PROCEDURE — 99499 UNLISTED E&M SERVICE: CPT | Mod: S$PBB,,, | Performed by: INTERNAL MEDICINE

## 2023-01-30 RX ORDER — METHYLPREDNISOLONE SOD SUCC 125 MG
80 VIAL (EA) INJECTION
Status: CANCELLED | OUTPATIENT
Start: 2023-01-30

## 2023-01-30 RX ORDER — HEPARIN 100 UNIT/ML
500 SYRINGE INTRAVENOUS
Status: CANCELLED | OUTPATIENT
Start: 2023-01-30

## 2023-01-30 RX ORDER — DIPHENHYDRAMINE HYDROCHLORIDE 50 MG/ML
50 INJECTION INTRAMUSCULAR; INTRAVENOUS ONCE AS NEEDED
Status: CANCELLED | OUTPATIENT
Start: 2023-01-30

## 2023-01-30 RX ORDER — EPINEPHRINE 0.3 MG/.3ML
0.3 INJECTION SUBCUTANEOUS ONCE AS NEEDED
Status: CANCELLED | OUTPATIENT
Start: 2023-01-30

## 2023-01-30 RX ORDER — PREDNISONE 10 MG/1
TABLET ORAL
Qty: 120 TABLET | Refills: 0 | Status: SHIPPED | OUTPATIENT
Start: 2023-01-30 | End: 2023-03-01

## 2023-01-30 RX ORDER — SODIUM CHLORIDE 0.9 % (FLUSH) 0.9 %
10 SYRINGE (ML) INJECTION
Status: CANCELLED | OUTPATIENT
Start: 2023-01-30

## 2023-01-30 RX ORDER — ACETAMINOPHEN 325 MG/1
650 TABLET ORAL
Status: CANCELLED | OUTPATIENT
Start: 2023-01-30

## 2023-01-30 RX ORDER — DIPHENHYDRAMINE HYDROCHLORIDE 50 MG/ML
25 INJECTION INTRAMUSCULAR; INTRAVENOUS
Status: CANCELLED | OUTPATIENT
Start: 2023-01-30

## 2023-01-30 ASSESSMENT — ROUTINE ASSESSMENT OF PATIENT INDEX DATA (RAPID3)
AM STIFFNESS SCORE: 1, YES
PATIENT GLOBAL ASSESSMENT SCORE: 8.5
MDHAQ FUNCTION SCORE: 2.6
WHEN YOU AWAKENED IN THE MORNING OVER THE LAST WEEK, PLEASE INDICATE THE AMOUNT OF TIME IT TAKES UNTIL YOU ARE AS LIMBER AS YOU WILL BE FOR THE DAY: 2 HRS
PSYCHOLOGICAL DISTRESS SCORE: 6.6
PAIN SCORE: 10
FATIGUE SCORE: 9.5
TOTAL RAPID3 SCORE: 9.05

## 2023-01-30 NOTE — PROGRESS NOTES
Subjective:       Patient ID: Nithin Wagner is a 64 y.o. male.    Chief Complaint: No chief complaint on file.    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   He was 53 when he was diagnosed with RA. He reports he reports he was having pain and swelling in knees and ankles. He then he had involvement in hands and shoulders. He was put on MTX.  He reports that the methotrexate did help with joints but it caused confusion so he stopped it for 2 years. He was taken aleve which helped his pain for 2 years.Then he developed HTN on NSAIDS so he stopped them.  He was then put on hydrocodone 5 QID to control his pain. Reports that warm weather improves his pain. He is back on methotrexate. Today, it will be his 5th dose. He is taking folic acid 1 mg po qday.  He has never tried up to 8 pills once a week. He is taking prednisone 10mg po BID for 4 weeks. He still has some pain in ankles and knees.  He does get mild swelling in knees and ankles. He was previously on prednisone 40mg a day. He smokes 1 pack per day for 40 years    family hx:cousin: connective tissue disease    Interval history: .  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 His last shot of Kevzara was in September. He does not think Kevzara helped him off the steroids.  He underwent emergent excision and debridement of necrotic tissue on 11/15. Repeat washout performed 11/17/22.  He was then discharged to [facility].  Patient then presented to hospital on 1/26 with groin pain and found to have mild folliculitis and RA flare. He was prescribed bactrim and steroids.  He had recent flare. He went to [facility] due to pain in shoulders, swelling in hands, both knees, both ankles.   He took solumedrol 125 mg IV and it helped his pain and swelling. He is taking prednisone 40mg in morning.            Past Medical History:   Diagnosis Date    Arthritis     Diabetes mellitus     Hyperlipidemia     Hypertension        Review of Systems      Review of Systems   Constitutional: Negative for fever.   Eyes: Negative for redness.   Respiratory: Negative for cough, hemoptysis, sputum production and shortness of breath.    Cardiovascular: Negative for chest pain.   Gastrointestinal: Negative for constipation and diarrhea.   Musculoskeletal: Positive for joint pain.   Skin: Negative for rash.   Neurological: Negative for headaches.   Endo/Heme/Allergies: Does not bruise/bleed easily.         Objective:   There were no vitals taken for this visit.     Physical Exam   Constitutional: He is oriented to person, place, and time and well-developed, well-nourished, and in no distress. No distress.   HENT:   Head: Normocephalic and atraumatic.   Right Ear: External ear normal.   Eyes: Conjunctivae and EOM are normal. Pupils are equal, round, and reactive to light. Right eye exhibits no discharge. Left eye exhibits no discharge. No scleral icterus.   Neck: Normal range of motion. Neck supple. No JVD present. No tracheal deviation present. No thyromegaly present.   Cardiovascular: Normal rate and regular rhythm.    Pulmonary/Chest: Effort normal and breath sounds normal. No stridor. No respiratory distress. He has no wheezes. He has no rales. He exhibits no tenderness.   Abdominal: Soft. Bowel sounds are normal. He exhibits no distension and no mass. There is no tenderness. There is no rebound and no guarding.   Lymphadenopathy:     He has no cervical adenopathy.   Neurological: He is alert and oriented to person, place, and time. He displays normal reflexes. No cranial nerve deficit. He exhibits normal muscle tone. Coordination normal.   Skin: Skin is warm and dry. No rash noted. He is not diaphoretic. No erythema. No pallor.     Musculoskeletal: He exhibits edema and tenderness. He exhibits no deformity.      labs:  reviewed  Results for CECILIA MEAD (MRN [id number]) as of 4/14/2020 12:44   Ref. Range 4/7/2020 13:52   CCP Antibodies Latest Ref Range: <5.0 U/mL 336.9 (H)   Rheumatoid Factor Latest Ref Range: 0.0 - 15.0 IU/mL 456.0 (H)       No data to display     Assessment:     67 year old with PMH of  Type II DM, HTN, gout, right atrial myxoma s/p surgery in 2018,  CAD s/p 3 stents, PE, right upper lobectomy secondary to right atrial myxoma, latent TB s/p INH x6 in 1982,Polycythemia, MARI    here  For follow up of  rheumatoid arthritis. He was requiring high doses of steroid to function when we initially met.    Given that he has +hep B core antibody, he was started on lamivudine by hepatology but he had to stop due to GI upset.  I took him off  methotrexate give his baseline thrombocytopenia, mild LFT elevation, and prior lung surgery.  He was doing good on enbrel but developed CHF.      He is not able to function with less than prednisone 17.5mg a day. I TOLD HIM  I am concerned about long term side effects of steroids.  I was going to switch him to orencia infusions but insurance company did not pay for it. 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 His last shot of Kevzara was in September. He does not think Kevzara helped him off the steroids.  He underwent emergent excision and debridement of necrotic tissue on 11/15. Repeat washout performed 11/17/22.  He was then [facility].  Patient then presented to hospital on 1/26 with groin pain and found to have mild folliculitis and RA flare.  I told him I would like to restart him on Orencia infusions if cleared by ID.          Plan:       Problem List Items Addressed This Visit       None        -  predniSONE (DELTASONE) 10 MG tablet 120 tablet 0 1/30/2023  No   Sig: Take 4 tablets daily for 7 days and then 3 tablets daily for 7 days, 2 tablets daily for 7 days, and then 1 tablet daily   - start Orencia infusions (Risks   discussed with patient and not limited to cell count abnormalities, malignancy, allergic  reaction to medication, and increased risk of infection. Patient agrees with starting medication.    I told him I am concerned he may be treating fibromyalgia with the steroids.  No MTX given MARI and baseline thrombocytopenia, poor lung reserve  Continue folic acid 1 mg po qday   -continue  lamivudine for Hep B core antibody  Follow up with GI  No anti-tnf given CHF  No rosales kinase given history of PE    45* minutes of total time spent on the encounter, which includes face to face time and non-face to face time preparing to see the patient (eg, review of tests), Obtaining and/or reviewing separately obtained history, Documenting clinical information in the electronic or other health record, Independently interpreting results (not separately reported) and communicating results to the patient/family/caregiver, or Care coordination (not separately reported).

## 2023-02-01 ENCOUNTER — OFFICE VISIT (OUTPATIENT)
Dept: INFECTIOUS DISEASES | Facility: CLINIC | Age: 68
End: 2023-02-01
Payer: MEDICARE

## 2023-02-01 VITALS
TEMPERATURE: 98 F | HEIGHT: 70 IN | WEIGHT: 212.31 LBS | BODY MASS INDEX: 30.39 KG/M2 | SYSTOLIC BLOOD PRESSURE: 134 MMHG | HEART RATE: 99 BPM | DIASTOLIC BLOOD PRESSURE: 81 MMHG

## 2023-02-01 DIAGNOSIS — L73.9 FOLLICULITIS: Primary | ICD-10-CM

## 2023-02-01 LAB
BACTERIA BLD CULT: NORMAL
BACTERIA BLD CULT: NORMAL

## 2023-02-01 PROCEDURE — 99999 PR PBB SHADOW E&M-EST. PATIENT-LVL V: CPT | Mod: PBBFAC,,, | Performed by: INTERNAL MEDICINE

## 2023-02-01 PROCEDURE — 99215 PR OFFICE/OUTPT VISIT, EST, LEVL V, 40-54 MIN: ICD-10-PCS | Mod: S$GLB,,, | Performed by: INTERNAL MEDICINE

## 2023-02-01 PROCEDURE — 3008F BODY MASS INDEX DOCD: CPT | Mod: CPTII,S$GLB,, | Performed by: INTERNAL MEDICINE

## 2023-02-01 PROCEDURE — 3288F PR FALLS RISK ASSESSMENT DOCUMENTED: ICD-10-PCS | Mod: CPTII,S$GLB,, | Performed by: INTERNAL MEDICINE

## 2023-02-01 PROCEDURE — 99215 OFFICE O/P EST HI 40 MIN: CPT | Mod: S$GLB,,, | Performed by: INTERNAL MEDICINE

## 2023-02-01 PROCEDURE — 1101F PT FALLS ASSESS-DOCD LE1/YR: CPT | Mod: CPTII,S$GLB,, | Performed by: INTERNAL MEDICINE

## 2023-02-01 PROCEDURE — 3079F DIAST BP 80-89 MM HG: CPT | Mod: CPTII,S$GLB,, | Performed by: INTERNAL MEDICINE

## 2023-02-01 PROCEDURE — 1111F DSCHRG MED/CURRENT MED MERGE: CPT | Mod: CPTII,S$GLB,, | Performed by: INTERNAL MEDICINE

## 2023-02-01 PROCEDURE — 1101F PR PT FALLS ASSESS DOC 0-1 FALLS W/OUT INJ PAST YR: ICD-10-PCS | Mod: CPTII,S$GLB,, | Performed by: INTERNAL MEDICINE

## 2023-02-01 PROCEDURE — 3075F SYST BP GE 130 - 139MM HG: CPT | Mod: CPTII,S$GLB,, | Performed by: INTERNAL MEDICINE

## 2023-02-01 PROCEDURE — 99999 PR PBB SHADOW E&M-EST. PATIENT-LVL V: ICD-10-PCS | Mod: PBBFAC,,, | Performed by: INTERNAL MEDICINE

## 2023-02-01 PROCEDURE — 1111F PR DISCHARGE MEDS RECONCILED W/ CURRENT OUTPATIENT MED LIST: ICD-10-PCS | Mod: CPTII,S$GLB,, | Performed by: INTERNAL MEDICINE

## 2023-02-01 PROCEDURE — 1125F AMNT PAIN NOTED PAIN PRSNT: CPT | Mod: CPTII,S$GLB,, | Performed by: INTERNAL MEDICINE

## 2023-02-01 PROCEDURE — 3075F PR MOST RECENT SYSTOLIC BLOOD PRESS GE 130-139MM HG: ICD-10-PCS | Mod: CPTII,S$GLB,, | Performed by: INTERNAL MEDICINE

## 2023-02-01 PROCEDURE — 3079F PR MOST RECENT DIASTOLIC BLOOD PRESSURE 80-89 MM HG: ICD-10-PCS | Mod: CPTII,S$GLB,, | Performed by: INTERNAL MEDICINE

## 2023-02-01 PROCEDURE — 3008F PR BODY MASS INDEX (BMI) DOCUMENTED: ICD-10-PCS | Mod: CPTII,S$GLB,, | Performed by: INTERNAL MEDICINE

## 2023-02-01 PROCEDURE — 1125F PR PAIN SEVERITY QUANTIFIED, PAIN PRESENT: ICD-10-PCS | Mod: CPTII,S$GLB,, | Performed by: INTERNAL MEDICINE

## 2023-02-01 PROCEDURE — 3288F FALL RISK ASSESSMENT DOCD: CPT | Mod: CPTII,S$GLB,, | Performed by: INTERNAL MEDICINE

## 2023-02-01 NOTE — PROGRESS NOTES
Infectious Disease Clinic Note  02/06/2023       Subjective:       Patient ID: Nithin Wagner is a 67 y.o. male being seen for an new visit.    Chief Complaint: Follow-up    HPI  Mr. Wagner is a 67 y.o. M, with hx of Kami's gangrene, T2DM, obesity, CAD, CHF, HTN, RA, PVD, who presented to ED on 1/26/23 due to concern for infection in the groin and referred to ID for hospital follow-up.     Initially admitted 11/15 for kami's gangrene- Empiric initiated with cefepime, clindamycin, metronidazole, and vancomycin.  Urology consulted and he underwent emergent excision and debridement of necrotic tissue on 11/15. Repeat washout performed 11/17, and bedside debridement 11/22.  Wound cultures with Anaerococcus and Prevotella species. Discharged to SNF 12/31- 1/9.  Fell at home on the same day of discharge. Presented to ED 1/26 with pain and cellulitis like symptoms to groin area and was re-admitted for presumed cellulitis and a flare of rheumatoid arthritis.  He had severe pain in multiple joints, left wrist, right shoulder and hip.  Urology and wound care evaluated his scrotum and noted some partial thickness skin loss along the healing surgical incision, no drainage noted.  Urology evaluated the wound and diagnosed mild folliculitis. He was continued on antibiotics and steroids per rheum.  Sent home on bactrim x 1 wk (for folliculitis) and pred 40.       Pt reports folliculitis has resolved. Has couple more days of bactrim left. Denies systemic signs or symptoms of infection.    Family History   Problem Relation Age of Onset    Hodgkin's lymphoma Mother     Diabetes type II Mother     Kidney failure Father     Diabetes type I Father     Cancer Sister      Social History     Socioeconomic History    Marital status: Single   Occupational History    Occupation: , respiratory therapist, founded [employer]     Comment: Retired   Tobacco Use    Smoking status: Every Day     Packs/day: 1.00     Years:  35.00     Pack years: 35.00     Types: Cigarettes    Smokeless tobacco: Never   Substance and Sexual Activity    Alcohol use: Not Currently    Drug use: No    Sexual activity: Never     Social Determinants of Health     Financial Resource Strain: High Risk    Difficulty of Paying Living Expenses: Hard   Food Insecurity: No Food Insecurity    Worried About Running Out of Food in the Last Year: Never true    Ran Out of Food in the Last Year: Never true   Transportation Needs: Unmet Transportation Needs    Lack of Transportation (Medical): Yes    Lack of Transportation (Non-Medical): Yes   Physical Activity: Inactive    Days of Exercise per Week: 0 days    Minutes of Exercise per Session: 0 min   Stress: Stress Concern Present    Feeling of Stress : To some extent   Social Connections: Socially Isolated    Frequency of Communication with Friends and Family: More than three times a week    Frequency of Social Gatherings with Friends and Family: More than three times a week    Attends Orthodoxy Services: Never    Active Member of Clubs or Organizations: No    Attends Club or Organization Meetings: Never    Marital Status: Never    Housing Stability: High Risk    Unable to Pay for Housing in the Last Year: Yes    Number of Places Lived in the Last Year: 1    Unstable Housing in the Last Year: No     Past Surgical History:   Procedure Laterality Date    CORONARY ANGIOGRAPHY N/A 3/10/2021    Procedure: ANGIOGRAM, CORONARY ARTERY - right radial;  Surgeon: Shemar Dempsey MD;  Location: [facility] CATH LAB;  Service: Cardiology;  Laterality: N/A;    CORONARY STENT PLACEMENT  03/10/2021    prox-mid RCA [place] 4.5 x 26 mm, 4.5 x 12 mm    INCISION AND DRAINAGE OF SCROTUM N/A 11/15/2022    Procedure: INCISION AND DRAINAGE, SCROTUM;  Surgeon: William Hatch MD;  Location: [facility] OR;  Service: Urology;  Laterality: N/A;    INCISION AND DRAINAGE OF SCROTUM N/A 11/17/2022    Procedure: INCISION AND DRAINAGE, SCROTUM;  Surgeon:  William Hatch MD;  Location: [facility];  Service: Urology;  Laterality: N/A;    LUNG LOBECTOMY Right 2008    RUL lobectomy after removal of atrial myxoma    PLEURA BIOPSY      RESECTION OF ATRIAL MYXOMA  2007       Patient's Medications   New Prescriptions    No medications on file   Previous Medications    ASCORBIC ACID, VITAMIN C, (VITAMIN C) 500 MG TABLET    Take 1 tablet (500 mg total) by mouth once daily.    ASPIRIN 81 MG CHEW    Take 81 mg by mouth.    BLOOD SUGAR DIAGNOSTIC STRP    To check BG 3 times daily, to use with insurance preferred meter    BLOOD-GLUCOSE METER KIT    Use to check glucose as directed    DIGOXIN (LANOXIN) 125 MCG TABLET    Take 1 tablet (125 mcg total) by mouth once daily.    ERGOCALCIFEROL (ERGOCALCIFEROL) 50,000 UNIT CAP    Take 1 capsule (50,000 Units total) by mouth every 7 days.    EZETIMIBE (ZETIA) 10 MG TABLET    Take 1 tablet (10 mg total) by mouth once daily.    FOLIC ACID (FOLVITE) 1 MG TABLET    Take 1 tablet (1 mg total) by mouth once daily.    FUROSEMIDE (LASIX) 40 MG TABLET    Take 1 tablet (40 mg total) by mouth daily as needed (fluid).    INSULIN ADMIN SUPPLIES INPN    InPen device, use to inject mealtime insulin 3 times daily. 1 pen to use, 1 for backup as needed.    INSULIN DEGLUDEC (TRESIBA FLEXTOUCH U-200) 200 UNIT/ML (3 ML) INSULIN PEN    Inject 50 Units into the skin 2 (two) times a day.    INSULIN NPH-INSULIN REGULAR, 70/30, 100 UNIT/ML (70-30) INJECTION    Inject into the skin. Sliding scale    LAMIVUDINE (EPIVIR) 150 MG TAB    TAKE 1 TABLET BY MOUTH EVERY DAY    MULTIVITAMIN (THERAGRAN) PER TABLET    Take 1 tablet by mouth once daily.    NITROGLYCERIN (NITROSTAT) 0.4 MG SL TABLET    Place 1 tablet (0.4 mg total) under the tongue every 5 (five) minutes as needed for Chest pain.    NOVOLOG PENFILL U-100 INSULIN 100 UNIT/ML CRTG    INJECT 14 UNITS INTO THE SKIN THREE TIMES A DAY WITH MEALS.    ONDANSETRON (ZOFRAN) 4 MG TABLET    Take 1 tablet (4 mg total) by  "mouth every 8 (eight) hours as needed for Nausea.    OXYCODONE (ROXICODONE) 10 MG TAB IMMEDIATE RELEASE TABLET    Take 10 mg by mouth every 4 (four) hours as needed for Pain.    PEN NEEDLE, DIABETIC (BD ULTRA-FINE RAHUL PEN NEEDLE) 32 GAUGE X 5/32" NDLE    Use to inject insulin 5 times daily    POTASSIUM CHLORIDE (MICRO-K) 10 MEQ CPSR    Take 10 mEq by mouth once daily.    PREDNISONE (DELTASONE) 10 MG TABLET    Take 4 tablets daily for 7 days and then 3 tablets daily for 7 days, 2 tablets daily for 7 days, and then 1 tablet daily    PREDNISONE (DELTASONE) 20 MG TABLET    Take 2 tablets (40 mg total) by mouth 2 (two) times daily.    PROAIR HFA 90 MCG/ACTUATION INHALER        SENNA-DOCUSATE 8.6-50 MG (PERICOLACE) 8.6-50 MG PER TABLET    Take 1 tablet by mouth 2 (two) times daily.    VORTIOXETINE (TRINTELLIX) 10 MG TAB    Take 1 tablet (10 mg total) by mouth once daily. Take at 6am   Modified Medications    Modified Medication Previous Medication    LINZESS 72 MCG CAP CAPSULE LINZESS 72 mcg Cap capsule       Take 1 capsule (72 mcg total) by mouth before breakfast.        SPIRONOLACTONE (ALDACTONE) 25 MG TABLET spironolactone (ALDACTONE) 25 MG tablet       Take 1 tablet (25 mg total) by mouth once daily.    TAKE 1 TABLET BY MOUTH EVERY DAY   Discontinued Medications    METOPROLOL TARTRATE (LOPRESSOR) 50 MG TABLET    Take 1 tablet (50 mg total) by mouth every 12 (twelve) hours.    OMNIPAQUE 350 350 MG IODINE/ML SOLN INJECTION           Patient Active Problem List    Diagnosis Date Noted    Anxiety and depression 01/28/2023    Folliculitis 01/27/2023    Rheumatoid arthritis involving multiple sites with positive rheumatoid factor 01/27/2023    Cellulitis 01/22/2023    Trauma 01/22/2023    Atherosclerotic peripheral vascular disease 01/18/2023    Open wound of scrotum and testes 01/03/2023    Hyponatremia 12/01/2022    Chronic diastolic heart failure 11/16/2022    Fatigue 03/09/2022    Rheumatoid arthritis flare 07/12/2021 " "   Chronic use of opiate drug for therapeutic purpose     Coronary artery disease 03/23/2021    Tobacco use disorder 03/10/2021    Polycythemia, secondary 03/10/2021    Canker sores oral 03/10/2021    Essential hypertension 11/12/2020    Systolic heart failure 11/12/2020    Type 2 diabetes mellitus with hyperglycemia, with long-term current use of insulin 11/12/2020    Class 1 obesity due to excess calories with serious comorbidity and body mass index (BMI) of 34.0 to 34.9 in adult 11/12/2020    NAFLD (nonalcoholic fatty liver disease) 08/21/2020    Hepatitis B core antibody positive 04/24/2020       Review of Systems   Review of Systems   Constitutional:  Positive for chills (chronic). Negative for fever and malaise/fatigue.   HENT:  Negative for congestion and sore throat.    Eyes:  Negative for blurred vision and double vision.   Respiratory:  Negative for cough and shortness of breath.    Cardiovascular:  Negative for chest pain and palpitations.   Gastrointestinal:  Negative for diarrhea and vomiting.   Genitourinary:  Negative for dysuria and frequency.   Musculoskeletal:  Negative for myalgias and neck pain.   Skin:  Negative for itching and rash.   Neurological:  Negative for dizziness and headaches.   Psychiatric/Behavioral:  Negative for substance abuse. The patient is not nervous/anxious.    All other systems reviewed and are negative.        Objective:      /81   Pulse 99   Temp 98.4 °F (36.9 °C) (Oral)   Ht 5' 10" (1.778 m)   Wt 96.3 kg (212 lb 4.9 oz)   BMI 30.46 kg/m²   Estimated body mass index is 30.46 kg/m² as calculated from the following:    Height as of this encounter: 5' 10" (1.778 m).    Weight as of this encounter: 96.3 kg (212 lb 4.9 oz).    Physical Exam  Vitals and nursing note reviewed.   Constitutional:       Appearance: Normal appearance. He is not ill-appearing.   HENT:      Head: Normocephalic and atraumatic.      Nose: Nose normal. No congestion.      Mouth/Throat:      " Mouth: Mucous membranes are moist.      Pharynx: Oropharynx is clear.   Eyes:      Conjunctiva/sclera: Conjunctivae normal.      Pupils: Pupils are equal, round, and reactive to light.   Cardiovascular:      Rate and Rhythm: Normal rate and regular rhythm.   Pulmonary:      Effort: Pulmonary effort is normal. No respiratory distress.      Breath sounds: Normal breath sounds.   Abdominal:      General: Abdomen is flat. There is no distension.      Palpations: Abdomen is soft.   Genitourinary:     Comments: Skin is intact at scrotum. Skin is pink- not erythematous or edematous  Musculoskeletal:         General: No swelling. Normal range of motion.      Cervical back: Normal range of motion and neck supple.   Skin:     General: Skin is warm and dry.      Findings: No lesion or rash.   Neurological:      General: No focal deficit present.      Mental Status: He is alert and oriented to person, place, and time.   Psychiatric:         Mood and Affect: Mood normal.         Behavior: Behavior normal.       Assessment:         1. Folliculitis              Plan:       Nithin was seen today for follow-up.    Diagnoses and all orders for this visit:    Folliculitis  Infection has resolved  Return precautions provided    Rheumatoid Arthritis  Continue bactrim 1 DS tablet on Mon, Wed, Fri while on high dose steroids  Can stop bactrim once pred dose tapered to less than 40mg / day    RTC PRN  Sylvie Dc MD  Infectious Disease     Total professional time spent for the encounter: 60 minutes  Time was spent preparing to see the patient, reviewing results of prior testing, obtaining and/or reviewing separately obtained history, performing a medically appropriate examination and interview, counseling and educating the patient/family, ordering medications/tests/procedures, referring and communicating with other health care professionals, documenting clinical information in the electronic health record, and independently  interpreting results.

## 2023-02-02 ENCOUNTER — PATIENT MESSAGE (OUTPATIENT)
Dept: RHEUMATOLOGY | Facility: CLINIC | Age: 68
End: 2023-02-02
Payer: MEDICARE

## 2023-02-06 ENCOUNTER — HOSPITAL ENCOUNTER (OUTPATIENT)
Dept: RADIOLOGY | Facility: OTHER | Age: 68
Discharge: HOME | End: 2023-02-06
Attending: PHYSICIAN ASSISTANT
Payer: MEDICARE

## 2023-02-06 ENCOUNTER — CARE AT HOME (OUTPATIENT)
Dept: HOME HEALTH SERVICES | Facility: CLINIC | Age: 68
End: 2023-02-06
Payer: MEDICARE

## 2023-02-06 DIAGNOSIS — I10 ESSENTIAL HYPERTENSION: ICD-10-CM

## 2023-02-06 DIAGNOSIS — M25.512 ACUTE PAIN OF LEFT SHOULDER: ICD-10-CM

## 2023-02-06 DIAGNOSIS — F17.200 CURRENT EVERY DAY SMOKER: ICD-10-CM

## 2023-02-06 DIAGNOSIS — M25.511 ACUTE PAIN OF RIGHT SHOULDER: ICD-10-CM
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
## 2023-02-06 DIAGNOSIS — S31.30XD OPEN WOUND OF SCROTUM AND TESTES, SUBSEQUENT ENCOUNTER: Primary | ICD-10-CM

## 2023-02-06 PROCEDURE — 73030 XR SHOULDER TRAUMA 3 VIEW LEFT: ICD-10-PCS | Mod: 26,LT,, | Performed by: RADIOLOGY

## 2023-02-06 PROCEDURE — 99349 PR HOME VISIT,ESTAB PATIENT,LEVEL III: ICD-10-PCS | Mod: 25,S$GLB,, | Performed by: NURSE PRACTITIONER

## 2023-02-06 PROCEDURE — 73030 X-RAY EXAM OF SHOULDER: CPT | Mod: 26,RT,, | Performed by: RADIOLOGY

## 2023-02-06 PROCEDURE — 1111F DSCHRG MED/CURRENT MED MERGE: CPT | Mod: CPTII,S$GLB,, | Performed by: NURSE PRACTITIONER

## 2023-02-06 PROCEDURE — 73030 X-RAY EXAM OF SHOULDER: CPT | Mod: TC,FY,LT

## 2023-02-06 PROCEDURE — 99406 BEHAV CHNG SMOKING 3-10 MIN: CPT | Mod: S$GLB,,, | Performed by: NURSE PRACTITIONER

## 2023-02-06 PROCEDURE — 73030 X-RAY EXAM OF SHOULDER: CPT | Mod: TC,FY,RT

## 2023-02-06 PROCEDURE — 73030 X-RAY EXAM OF SHOULDER: CPT | Mod: 26,LT,, | Performed by: RADIOLOGY

## 2023-02-06 PROCEDURE — 1111F PR DISCHARGE MEDS RECONCILED W/ CURRENT OUTPATIENT MED LIST: ICD-10-PCS | Mod: CPTII,S$GLB,, | Performed by: NURSE PRACTITIONER

## 2023-02-06 PROCEDURE — 73030 XR SHOULDER TRAUMA 3 VIEW RIGHT: ICD-10-PCS | Mod: 26,RT,, | Performed by: RADIOLOGY

## 2023-02-06 PROCEDURE — 99349 HOME/RES VST EST MOD MDM 40: CPT | Mod: 25,S$GLB,, | Performed by: NURSE PRACTITIONER

## 2023-02-06 PROCEDURE — 99406 PR TOBACCO USE CESSATION INTERMEDIATE 3-10 MINUTES: ICD-10-PCS | Mod: S$GLB,,, | Performed by: NURSE PRACTITIONER

## 2023-02-06 RX ORDER — LINACLOTIDE 72 UG/1
72 CAPSULE, GELATIN COATED ORAL
Qty: 30 CAPSULE | Refills: 2 | Status: SHIPPED | OUTPATIENT
Start: 2023-02-06 | End: 2023-05-07

## 2023-02-06 RX ORDER — SPIRONOLACTONE 25 MG/1
25 TABLET ORAL DAILY
Qty: 90 TABLET | Refills: 3 | Status: SHIPPED | OUTPATIENT
Start: 2023-02-06 | End: 2023-10-24

## 2023-02-07 ENCOUNTER — TELEPHONE (OUTPATIENT)
Dept: HOME HEALTH SERVICES | Facility: CLINIC | Age: 68
End: 2023-02-07
Payer: MEDICARE

## 2023-02-07 VITALS
RESPIRATION RATE: 16 BRPM | BODY MASS INDEX: 29.03 KG/M2 | DIASTOLIC BLOOD PRESSURE: 80 MMHG | HEIGHT: 70 IN | WEIGHT: 202.81 LBS | SYSTOLIC BLOOD PRESSURE: 140 MMHG | OXYGEN SATURATION: 96 % | HEART RATE: 102 BPM

## 2023-02-07 NOTE — PATIENT INSTRUCTIONS
Instructions:  - [facility] Nurse Practitioner to schedule home follow-up visit with patient in 4-6 weeks or as needed.  - Continue all medications, treatments and therapies as ordered.   - Follow all instructions, recommendations as discussed.  - Maintain Safety Precautions at all times.  - Attend all medical appointments as scheduled.  - For worsening symptoms: call Primary Care Physician or Nurse Practitioner.  - For emergencies, call 911 or immediately report to the nearest emergency room.

## 2023-02-07 NOTE — PROGRESS NOTES
"[facility]  Medical Home Visit    Visit Date: 2/6/23  Encounter Provider: Giovanni Adkins  PCP:  Tushar Drew MD    Subjective:      Patient ID: Nithin Wagner is a 67 y.o. male.    Consult Requested By:  No ref. provider found  Reason for Consult:    Nithin is being seen at home due to being seen at home due to physical debility that presents a taxing effort to leave the home, to mitigate high risk of hospital readmission and/or due to the limited availability of reliable or safe options for transportation to the point of access to the provider. Prior to treatment on this visit the chart was reviewed and patient verbal consent was obtained.    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      TODAY: With this visit today patient is ambulatory without his cane or walker, A&O x 3,  living alone in his upstairs apartment. Currently pt's cousin is here staying with him for a few weeks. Pt has greatly improved since my last visit 1/25/23. He is ambulatory by himself now, denies any falls.  Pt states appetite is  improving, he is having meals brought to him by his nephew and neighbors. Pt has not been taking his normally prescribed hyperglycemic regimen, he doses himself based on his Dex readings. Currently taking 25U NPH BID.  CBG readings have been in 130's-140's fasting.   Pt denies any bowel/bladder problems, no longer having constipation problems.   Pt is taking his oxycodone 10 as directed for pain, trying to wean himself off, states he "wants off the medication".    Pt currently on 30mg of Prednisone qd per Rheumatology, when taking >20mg of Prednisone, pt is to take Bactrim DS BID. He is currently taking antibx.     Pt has [facility] working with him.     VSS. Denies fever, chest pain, shortness of breath, nausea, vomiting, " diarrhea.  All hospital discharge orders reviewed and being followed, all medications reconciled and reviewed, patient  verbalized understanding. No other needs identified at this time.      Review of Systems   Constitutional:  Positive for activity change. Negative for appetite change, chills and fever.   Respiratory:  Negative for cough, chest tightness and shortness of breath.    Cardiovascular:  Negative for chest pain, palpitations and leg swelling.   Gastrointestinal:  Negative for abdominal pain, constipation, diarrhea, nausea and vomiting.   Genitourinary:  Negative for decreased urine volume, difficulty urinating, dysuria, frequency and scrotal swelling.   Musculoskeletal:  Positive for arthralgias. Negative for gait problem and myalgias.   Skin:  Positive for wound. Negative for color change and rash.   Neurological:  Negative for dizziness, syncope, weakness and headaches.   Hematological:  Does not bruise/bleed easily.   Psychiatric/Behavioral:  Negative for confusion and sleep disturbance. The patient is not nervous/anxious.      Assessments:  Environmental: dirty, cluttered  Functional Status: mos assist  Safety: high fall risk  Nutritional: adequate, appetite poor  Home Health/DME/Supplies: Rolator, cane, wound care supplies    Objective:   Physical Exam  Vitals reviewed.   Constitutional:       General: He is awake. He is not in acute distress.     Appearance: Normal appearance. He is overweight. He is not ill-appearing or toxic-appearing.   HENT:      Head: Atraumatic.   Cardiovascular:      Rate and Rhythm: Normal rate and regular rhythm.      Heart sounds: Normal heart sounds.   Pulmonary:      Effort: Pulmonary effort is normal.      Breath sounds: Normal breath sounds and air entry. No decreased breath sounds or rales.   Abdominal:      General: Abdomen is flat. Bowel sounds are normal. There is no distension.      Palpations: Abdomen is soft.      Tenderness: There is no abdominal tenderness.  "  Genitourinary:     Testes:         Right: Mass (area has decreased in size, indurated. R groin) present.         Left: Mass, tenderness or swelling not present.      Comments: Incision to L scrotum healed, closed. No s/s of infection present. R groin with moisture, using barrier cream.  Musculoskeletal:      Right lower leg: No edema.      Left lower leg: No edema.   Skin:     General: Skin is warm.      Capillary Refill: Capillary refill takes less than 2 seconds.      Findings: Wound present. Abscess: R groin. Erythema: bilateral groins.  Neurological:      General: No focal deficit present.      Mental Status: He is alert and oriented to person, place, and time.      Cranial Nerves: Cranial nerves 2-12 are intact.   Psychiatric:         Attention and Perception: Attention normal.         Mood and Affect: Mood normal. Mood is not anxious.         Speech: Speech normal.         Behavior: Behavior normal. Behavior is cooperative.         Cognition and Memory: Cognition and memory normal.       Vitals:    02/06/23 1325   BP: (!) 140/80   Pulse: 102   Resp: 16   SpO2: 96%   Weight: 92 kg (202 lb 12.8 oz)   Height: 5' 10" (1.778 m)   PainSc:   2   PainLoc: Shoulder     Body mass index is 29.1 kg/m².    Assessment:     1. Open wound of scrotum and testes, subsequent encounter    2. Essential hypertension    3. Current every day smoker        Plan:     Ethical / Legal: Advance Care Planning   Surrogate decision maker:  Name -self  Code Status:  FULL  LaPOST:  no  Other advance directive:  no, Capacity to make medical decisions: yes, Conflict-no       Essential Hypertension   -Pt using Lasix prn  -Per last cardiology visit on 1/18, pt to decrease Metoprolol to 25mg qd x 1 month then d/c. Pt has d/c now.      2. Hyperlipidemia  -     Pt has d/c his ezetimibe (ZETIA) 10 mg tablet. States he is tired of taking so much medicine.    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 Incision closed, no drainage, no s/s of infection  -Pt has small 0.5-1cm indurated area to R groin area. Area improved since last visit.   -Using barrier cream to groins to reduce moisture and redness      [place] [facility]  [place]essment & Plan:  -Pt still with bilateral shoulder pain from fall while [place] his [place] [facility] [place]. R worse than L.  Pt did not complete shoulder xrays as ordered. Has appt today to finish. All other xrays negative for fx.   -Advised pt to have xray performed, V.U..      5. Tobacco abuse   A consultation session <10 minutes was held with the patient discussing the impact on the patient's health and options for smoking cessation. Will continue to revisit with the patient.   -Pt currently smoking 3/4-1ppd      Other orders  -     folic acid (FOLVITE) 1 MG tablet; Take 1 tablet (1 mg total) by mouth once daily.  Dispense: 30 tablet; Refill: 5  -     vortioxetine (TRINTELLIX) 10 mg Tab; Take 1 tablet (10 mg total) by mouth once daily. Take at 6am  Dispense: 30 tablet; Refill: 3         Total face-to-face time was 30 minutes, >50% of this was spent on counseling and coordination of care. The following issues were discussed: primary and secondary diagnoses, co-morbidities, prescribed medications, treatment modalities, importance of compliance with medical advice, and directives for follow-up care.    Were controlled substances prescribed?  No    Home NP will f/u with pt in 4-6 wks. Information given to pt if questions, concerns or status changes to contact me.     Follow Up Appointments:   Future Appointments   Date Time Provider Department Center   3/1/2023  2:00 PM Shemar Dempsey MD [facility] [id number] [facility]   3/1/2023  2:30 PM Lalitha Stroud RN, CDE [facility] [id number] [facility]   4/18/2023  9:15 AM LAB, [facility] LABDRAW [facility]   4/18/2023  9:30 AM LAB, [facility] LABDRAW [facility]   4/26/2023  1:00 PM Kevin Lares MD [facility] [id number] [facility]   5/2/2023  1:30  PM INFUSION, CHAIR 8 [facility] INF [facility]   5/16/2023  1:30 PM INFUSION, CHAIR 8 [facility]   5/30/2023  1:30 PM INFUSION, CHAIR 8 [facility]       Signature: Giovanni Adkins NP

## 2023-02-07 NOTE — ASSESSMENT & PLAN NOTE
-Pt using Lasix prn  -Per last cardiology visit on 1/18, pt to decrease Metoprolol to 25mg qd x 1 month then d/c. Pt has d/c now.

## 2023-02-17 ENCOUNTER — TELEPHONE (OUTPATIENT)
Dept: ENDOCRINOLOGY | Facility: CLINIC | Age: 68
End: 2023-02-17
Payer: MEDICARE

## 2023-02-17 NOTE — TELEPHONE ENCOUNTER
Got fax for [employer], inpen w/ novolog/fiasp    Last seen 1/18/2023  Lab Results   Component Value Date    HGBA1C 7.6 (H) 11/15/2022     E11.65  Sending in

## 2023-02-19 ENCOUNTER — OFFICE VISIT (OUTPATIENT)
Dept: URGENT CARE | Facility: CLINIC | Age: 68
End: 2023-02-19
Payer: MEDICARE

## 2023-02-19 VITALS
WEIGHT: 202 LBS | RESPIRATION RATE: 18 BRPM | BODY MASS INDEX: 28.92 KG/M2 | SYSTOLIC BLOOD PRESSURE: 130 MMHG | OXYGEN SATURATION: 95 % | HEIGHT: 70 IN | DIASTOLIC BLOOD PRESSURE: 85 MMHG | HEART RATE: 104 BPM | TEMPERATURE: 98 F

## 2023-02-19 DIAGNOSIS — Z87.438 HISTORY OF FOURNIER'S GANGRENE: ICD-10-CM

## 2023-02-19 DIAGNOSIS — Z72.0 TOBACCO USE: Primary | ICD-10-CM

## 2023-02-19 DIAGNOSIS — B37.2 SKIN YEAST INFECTION: ICD-10-CM

## 2023-02-19 PROCEDURE — 3075F SYST BP GE 130 - 139MM HG: CPT | Mod: CPTII,S$GLB,,

## 2023-02-19 PROCEDURE — 1126F PR PAIN SEVERITY QUANTIFIED, NO PAIN PRESENT: ICD-10-PCS | Mod: CPTII,S$GLB,,

## 2023-02-19 PROCEDURE — 1126F AMNT PAIN NOTED NONE PRSNT: CPT | Mod: CPTII,S$GLB,,

## 2023-02-19 PROCEDURE — 1159F PR MEDICATION LIST DOCUMENTED IN MEDICAL RECORD: ICD-10-PCS | Mod: CPTII,S$GLB,,

## 2023-02-19 PROCEDURE — 1160F PR REVIEW ALL MEDS BY PRESCRIBER/CLIN PHARMACIST DOCUMENTED: ICD-10-PCS | Mod: CPTII,S$GLB,,

## 2023-02-19 PROCEDURE — 3075F PR MOST RECENT SYSTOLIC BLOOD PRESS GE 130-139MM HG: ICD-10-PCS | Mod: CPTII,S$GLB,,

## 2023-02-19 PROCEDURE — 3008F BODY MASS INDEX DOCD: CPT | Mod: CPTII,S$GLB,,

## 2023-02-19 PROCEDURE — 3079F PR MOST RECENT DIASTOLIC BLOOD PRESSURE 80-89 MM HG: ICD-10-PCS | Mod: CPTII,S$GLB,,

## 2023-02-19 PROCEDURE — 1160F RVW MEDS BY RX/DR IN RCRD: CPT | Mod: CPTII,S$GLB,,

## 2023-02-19 PROCEDURE — 3079F DIAST BP 80-89 MM HG: CPT | Mod: CPTII,S$GLB,,

## 2023-02-19 PROCEDURE — 1159F MED LIST DOCD IN RCRD: CPT | Mod: CPTII,S$GLB,,

## 2023-02-19 PROCEDURE — 99213 PR OFFICE/OUTPT VISIT, EST, LEVL III, 20-29 MIN: ICD-10-PCS | Mod: S$GLB,,,

## 2023-02-19 PROCEDURE — 3008F PR BODY MASS INDEX (BMI) DOCUMENTED: ICD-10-PCS | Mod: CPTII,S$GLB,,

## 2023-02-19 PROCEDURE — 99213 OFFICE O/P EST LOW 20 MIN: CPT | Mod: S$GLB,,,

## 2023-02-19 RX ORDER — FLUCONAZOLE 150 MG/1
150 TABLET ORAL DAILY
Qty: 2 TABLET | Refills: 0 | Status: SHIPPED | OUTPATIENT
Start: 2023-02-19 | End: 2023-02-20

## 2023-02-19 RX ORDER — NYSTATIN 100000 [USP'U]/G
POWDER TOPICAL 4 TIMES DAILY
Qty: 30 G | Refills: 0 | Status: SHIPPED | OUTPATIENT
Start: 2023-02-19 | End: 2023-02-19

## 2023-02-19 RX ORDER — FLUCONAZOLE 150 MG/1
150 TABLET ORAL DAILY
Qty: 2 TABLET | Refills: 0 | Status: SHIPPED | OUTPATIENT
Start: 2023-02-19 | End: 2023-02-19

## 2023-02-19 RX ORDER — NYSTATIN 100000 [USP'U]/G
POWDER TOPICAL 4 TIMES DAILY
Qty: 30 G | Refills: 0 | Status: SHIPPED | OUTPATIENT
Start: 2023-02-19 | End: 2024-03-14

## 2023-02-19 NOTE — PROGRESS NOTES
"Subjective:       Patient ID: Nithin Wagner is a 67 y.o. male.    Vitals:  height is 5' 10" (1.778 m) and weight is 91.6 kg (202 lb). His oral temperature is 98 °F (36.7 °C). His blood pressure is 130/85 and his pulse is 104. His respiration is 18 and oxygen saturation is 95%.     Chief Complaint: Cyst    This is a 67 y.o. male who presents today with a chief complaint of "ruptured cyst." X 5 days since onset.  Patient has a cyst on his inner right thigh. Cyst ruptured during taking a shower. Patient has hx of kami's gangrene and has tried Bactrim, and tapered now down to 10mg of prednisone daily.   Patient states it feels smaller now in exam room. Thinks it is a yeast infection. Allergic to PCN.      Cyst  This is a new problem. The current episode started today. The problem occurs constantly. The problem has been unchanged. Nothing aggravates the symptoms. He has tried nothing for the symptoms. The treatment provided no relief.     Genitourinary:  Negative for scrotal swelling and testicular pain.   Skin:  Positive for color change. Negative for erythema.        Yeast infection     Objective:      Vitals:    02/19/23 1429   BP: 130/85   Pulse: 104   Resp: 18   Temp: 98 °F (36.7 °C)       Physical Exam   Constitutional: He is oriented to person, place, and time. He appears well-developed.   HENT:   Head: Normocephalic and atraumatic. Head is without abrasion, without contusion and without laceration.   Ears:   Right Ear: External ear normal.   Left Ear: External ear normal.   Nose: Nose normal.   Mouth/Throat: Oropharynx is clear and moist and mucous membranes are normal.   Eyes: Conjunctivae, EOM and lids are normal. Pupils are equal, round, and reactive to light.   Neck: Trachea normal and phonation normal. Neck supple.   Cardiovascular: Normal rate, regular rhythm and normal heart sounds.   Pulmonary/Chest: Effort normal and breath sounds normal. No stridor. No respiratory distress.   Musculoskeletal: Normal " range of motion.         General: Normal range of motion.   Neurological: He is alert and oriented to person, place, and time.   Skin: Skin is warm, dry, intact, rash and no abscessed. Capillary refill takes less than 2 seconds. No abrasion, No burn, No bruising, No erythema and No ecchymosis        Psychiatric: His speech is normal and behavior is normal. Judgment and thought content normal.   Nursing note and vitals reviewed.      Assessment:       1. Tobacco use    2. History of Mary's gangrene    3. Skin yeast infection          Plan:         Tobacco use  -     Ambulatory referral/consult to Smoking Cessation Program    History of Mary's gangrene  -     Ambulatory referral/consult to Dermatology    Skin yeast infection  -     Discontinue: fluconazole (DIFLUCAN) 150 MG Tab; Take 1 tablet (150 mg total) by mouth once daily. Please take one dose now, if you are still having symptoms in 72 hours (3 days) take second dose. for 1 day  Dispense: 2 tablet; Refill: 0  -     Discontinue: nystatin (MYCOSTATIN) powder; Apply topically 4 (four) times daily. Apply to intertriginous areas as directed up to four times daily to reduce moisture. for 14 days  Dispense: 30 g; Refill: 0  -     nystatin (MYCOSTATIN) powder; Apply topically 4 (four) times daily. Apply to intertriginous areas as directed up to four times daily to reduce moisture. for 14 days  Dispense: 30 g; Refill: 0  -     fluconazole (DIFLUCAN) 150 MG Tab; Take 1 tablet (150 mg total) by mouth once daily. Please take one dose now, if you are still having symptoms in 72 hours (3 days) take second dose. for 1 day  Dispense: 2 tablet; Refill: 0         Patient Instructions   Take oral antibiotic as prescribe  Apply topical antifungal powder  Keep area dry  Follow up with dermatology or your PCP  Monitor for signs of worsening infection.      - You must understand that you have received an Urgent Care treatment only and that you may be released before all of  your medical problems are known or treated.   - You, the patient, will arrange for follow up care as instructed with your primary care provider or recommended specialist.   - If your condition worsens or fails to improve we recommend that you receive another evaluation at the ER immediately or contact your PCP to discuss your concerns, or return here.   - Please do not drive or make any important decisions for 24 hours if you have received any pain medications, sedatives or mood altering drugs during your visit.    Disclaimer: This document was drafted with the use of a voice recognition device and is likely to have sound alike errors.

## 2023-02-19 NOTE — PATIENT INSTRUCTIONS
Take oral antibiotic as prescribe  Apply topical antifungal powder  Keep area dry  Follow up with dermatology or your PCP  Monitor for signs of worsening infection.      - You must understand that you have received an Urgent Care treatment only and that you may be released before all of your medical problems are known or treated.   - You, the patient, will arrange for follow up care as instructed with your primary care provider or recommended specialist.   - If your condition worsens or fails to improve we recommend that you receive another evaluation at the ER immediately or contact your PCP to discuss your concerns, or return here.   - Please do not drive or make any important decisions for 24 hours if you have received any pain medications, sedatives or mood altering drugs during your visit.    Disclaimer: This document was drafted with the use of a voice recognition device and is likely to have sound alike errors.

## 2023-02-22 ENCOUNTER — EXTERNAL HOME HEALTH (OUTPATIENT)
Dept: HOME HEALTH SERVICES | Facility: HOSPITAL | Age: 68
End: 2023-02-22
Payer: MEDICARE

## 2023-03-01 ENCOUNTER — OFFICE VISIT (OUTPATIENT)
Dept: CARDIOLOGY | Facility: CLINIC | Age: 68
End: 2023-03-01
Payer: MEDICARE

## 2023-03-01 ENCOUNTER — CLINICAL SUPPORT (OUTPATIENT)
Dept: DIABETES | Facility: CLINIC | Age: 68
End: 2023-03-01
Payer: MEDICARE

## 2023-03-01 VITALS
WEIGHT: 207.25 LBS | DIASTOLIC BLOOD PRESSURE: 64 MMHG | BODY MASS INDEX: 29.67 KG/M2 | WEIGHT: 208 LBS | BODY MASS INDEX: 29.84 KG/M2 | HEART RATE: 105 BPM | HEIGHT: 70 IN | SYSTOLIC BLOOD PRESSURE: 110 MMHG | OXYGEN SATURATION: 95 %

## 2023-03-01 DIAGNOSIS — I47.10 SUPRAVENTRICULAR TACHYCARDIA: ICD-10-CM

## 2023-03-01 DIAGNOSIS — E66.01 SEVERE OBESITY: ICD-10-CM

## 2023-03-01 DIAGNOSIS — Z79.4 TYPE 2 DIABETES MELLITUS WITH OTHER SPECIFIED COMPLICATION, WITH LONG-TERM CURRENT USE OF INSULIN: ICD-10-CM

## 2023-03-01 DIAGNOSIS — E11.69 TYPE 2 DIABETES MELLITUS WITH OTHER SPECIFIED COMPLICATION, WITH LONG-TERM CURRENT USE OF INSULIN: ICD-10-CM

## 2023-03-01 DIAGNOSIS — I50.32 CHRONIC DIASTOLIC HEART FAILURE: ICD-10-CM

## 2023-03-01 DIAGNOSIS — I10 PRIMARY HYPERTENSION: ICD-10-CM

## 2023-03-01 DIAGNOSIS — I70.209 ATHEROSCLEROTIC PERIPHERAL VASCULAR DISEASE: ICD-10-CM

## 2023-03-01 DIAGNOSIS — I25.10 ATHEROSCLEROSIS OF NATIVE CORONARY ARTERY OF NATIVE HEART WITHOUT ANGINA PECTORIS: Primary | ICD-10-CM

## 2023-03-01 PROCEDURE — 1159F MED LIST DOCD IN RCRD: CPT | Mod: CPTII,S$GLB,, | Performed by: INTERNAL MEDICINE

## 2023-03-01 PROCEDURE — 1101F PT FALLS ASSESS-DOCD LE1/YR: CPT | Mod: CPTII,S$GLB,, | Performed by: INTERNAL MEDICINE

## 2023-03-01 PROCEDURE — 1160F PR REVIEW ALL MEDS BY PRESCRIBER/CLIN PHARMACIST DOCUMENTED: ICD-10-PCS | Mod: CPTII,S$GLB,, | Performed by: INTERNAL MEDICINE

## 2023-03-01 PROCEDURE — 3078F DIAST BP <80 MM HG: CPT | Mod: CPTII,S$GLB,, | Performed by: INTERNAL MEDICINE

## 2023-03-01 PROCEDURE — 99999 PR PBB SHADOW E&M-EST. PATIENT-LVL III: ICD-10-PCS | Mod: PBBFAC,,, | Performed by: INTERNAL MEDICINE

## 2023-03-01 PROCEDURE — 1159F PR MEDICATION LIST DOCUMENTED IN MEDICAL RECORD: ICD-10-PCS | Mod: CPTII,S$GLB,, | Performed by: INTERNAL MEDICINE

## 2023-03-01 PROCEDURE — 3078F PR MOST RECENT DIASTOLIC BLOOD PRESSURE < 80 MM HG: ICD-10-PCS | Mod: CPTII,S$GLB,, | Performed by: INTERNAL MEDICINE

## 2023-03-01 PROCEDURE — 99215 PR OFFICE/OUTPT VISIT, EST, LEVL V, 40-54 MIN: ICD-10-PCS | Mod: S$GLB,,, | Performed by: INTERNAL MEDICINE

## 2023-03-01 PROCEDURE — 3288F FALL RISK ASSESSMENT DOCD: CPT | Mod: CPTII,S$GLB,, | Performed by: INTERNAL MEDICINE

## 2023-03-01 PROCEDURE — G0108 PR DIAB MANAGE TRN  PER INDIV: ICD-10-PCS | Mod: S$GLB,,, | Performed by: FAMILY MEDICINE

## 2023-03-01 PROCEDURE — 1101F PR PT FALLS ASSESS DOC 0-1 FALLS W/OUT INJ PAST YR: ICD-10-PCS | Mod: CPTII,S$GLB,, | Performed by: INTERNAL MEDICINE

## 2023-03-01 PROCEDURE — 99999 PR PBB SHADOW E&M-EST. PATIENT-LVL IV: CPT | Mod: PBBFAC,,,

## 2023-03-01 PROCEDURE — 3008F BODY MASS INDEX DOCD: CPT | Mod: CPTII,S$GLB,, | Performed by: INTERNAL MEDICINE

## 2023-03-01 PROCEDURE — 99999 PR PBB SHADOW E&M-EST. PATIENT-LVL IV: ICD-10-PCS | Mod: PBBFAC,,,

## 2023-03-01 PROCEDURE — 3074F SYST BP LT 130 MM HG: CPT | Mod: CPTII,S$GLB,, | Performed by: INTERNAL MEDICINE

## 2023-03-01 PROCEDURE — 99999 PR PBB SHADOW E&M-EST. PATIENT-LVL III: CPT | Mod: PBBFAC,,, | Performed by: INTERNAL MEDICINE

## 2023-03-01 PROCEDURE — 1126F AMNT PAIN NOTED NONE PRSNT: CPT | Mod: CPTII,S$GLB,, | Performed by: INTERNAL MEDICINE

## 2023-03-01 PROCEDURE — G0108 DIAB MANAGE TRN  PER INDIV: HCPCS | Mod: S$GLB,,, | Performed by: FAMILY MEDICINE

## 2023-03-01 PROCEDURE — 99215 OFFICE O/P EST HI 40 MIN: CPT | Mod: S$GLB,,, | Performed by: INTERNAL MEDICINE

## 2023-03-01 PROCEDURE — 1160F RVW MEDS BY RX/DR IN RCRD: CPT | Mod: CPTII,S$GLB,, | Performed by: INTERNAL MEDICINE

## 2023-03-01 PROCEDURE — 3288F PR FALLS RISK ASSESSMENT DOCUMENTED: ICD-10-PCS | Mod: CPTII,S$GLB,, | Performed by: INTERNAL MEDICINE

## 2023-03-01 PROCEDURE — 3074F PR MOST RECENT SYSTOLIC BLOOD PRESSURE < 130 MM HG: ICD-10-PCS | Mod: CPTII,S$GLB,, | Performed by: INTERNAL MEDICINE

## 2023-03-01 PROCEDURE — 3008F PR BODY MASS INDEX (BMI) DOCUMENTED: ICD-10-PCS | Mod: CPTII,S$GLB,, | Performed by: INTERNAL MEDICINE

## 2023-03-01 PROCEDURE — 1126F PR PAIN SEVERITY QUANTIFIED, NO PAIN PRESENT: ICD-10-PCS | Mod: CPTII,S$GLB,, | Performed by: INTERNAL MEDICINE

## 2023-03-01 NOTE — PROGRESS NOTES
[facility]    Chief Complaint  Chief Complaint   Patient presents with    Coronary Artery Disease       HPI:    Finally out of the hospital and back home.  Working with therapy to regain strength.  Has lost a lot of muscle mass.  Recently weaned off prednisone and OxyContin.    Medications  Current Outpatient Medications   Medication Sig Dispense Refill    aspirin 81 MG Chew Take 81 mg by mouth.      digoxin (LANOXIN) 125 mcg tablet Take 1 tablet (125 mcg total) by mouth once daily. 90 tablet 3    folic acid (FOLVITE) 1 MG tablet Take 1 tablet (1 mg total) by mouth once daily. 30 tablet 5    lamiVUDine (EPIVIR) 150 MG Tab TAKE 1 TABLET BY MOUTH EVERY DAY 30 tablet 23    LINZESS 72 mcg Cap capsule Take 1 capsule (72 mcg total) by mouth before breakfast. 30 capsule 2    multivitamin (THERAGRAN) per tablet Take 1 tablet by mouth once daily.      nitroGLYCERIN (NITROSTAT) 0.4 MG SL tablet Place 1 tablet (0.4 mg total) under the tongue every 5 (five) minutes as needed for Chest pain. 25 tablet 11    ondansetron (ZOFRAN) 4 MG tablet Take 1 tablet (4 mg total) by mouth every 8 (eight) hours as needed for Nausea. 60 tablet 6    vortioxetine (TRINTELLIX) 10 mg Tab Take 1 tablet (10 mg total) by mouth once daily. Take at 6am 30 tablet 3    ergocalciferol (ERGOCALCIFEROL) 50,000 unit Cap Take 1 capsule (50,000 Units total) by mouth every 7 days. 12 capsule 3    furosemide (LASIX) 40 MG tablet Take 1 tablet (40 mg total) by mouth daily as needed (fluid). (Patient not taking: Reported on 3/1/2023) 90 tablet 3    insulin admin supplies InPn InPen device, use to inject mealtime insulin 3 times daily. 1 pen to use, 1 for backup as needed. 2 each 3    insulin degludec (TRESIBA FLEXTOUCH U-200) 200 unit/mL (3 mL) insulin pen Inject 50 Units into the skin 2 (two) times a day. 15 pen 3    insulin NPH-insulin regular, 70/30, 100 unit/mL (70-30) injection Inject into the skin. Sliding scale      NOVOLOG PENFILL U-100  INSULIN 100 unit/mL Crtg INJECT 14 UNITS INTO THE SKIN THREE TIMES A DAY WITH MEALS. 30 each 11    nystatin (MYCOSTATIN) powder Apply topically 4 (four) times daily. Apply to intertriginous areas as directed up to four times daily to reduce moisture. for 14 days 30 g 0    PROAIR HFA 90 mcg/actuation inhaler       spironolactone (ALDACTONE) 25 MG tablet Take 1 tablet (25 mg total) by mouth once daily. 90 tablet 3     No current facility-administered medications for this visit.        History  Past Medical History:   Diagnosis Date    Arthritis     Atrial myxoma     CHF (congestive heart failure)     Coronary atherosclerosis     Diabetes mellitus, type 2     Difficult intubation     Hepatitis B     Hyperlipidemia     Hypertension     Non-alcoholic fatty liver disease     Rheumatoid arthritis     Rheumatoid arthritis flare 07/12/2021    Stroke     TIA    Systolic heart failure      Past Surgical History:   Procedure Laterality Date    CORONARY ANGIOGRAPHY N/A 3/10/2021    Procedure: ANGIOGRAM, CORONARY ARTERY - right radial;  Surgeon: Shemar Dempsey MD;  Location: [facility] CATH LAB;  Service: Cardiology;  Laterality: N/A;    CORONARY STENT PLACEMENT  03/10/2021    prox-mid RCA [place] 4.5 x 26 mm, 4.5 x 12 mm    INCISION AND DRAINAGE OF SCROTUM N/A 11/15/2022    Procedure: INCISION AND DRAINAGE, SCROTUM;  Surgeon: William Hatch MD;  Location: [facility] OR;  Service: Urology;  Laterality: N/A;    INCISION AND DRAINAGE OF SCROTUM N/A 11/17/2022    Procedure: INCISION AND DRAINAGE, SCROTUM;  Surgeon: William Hatch MD;  Location: [facility] OR;  Service: Urology;  Laterality: N/A;    LUNG LOBECTOMY Right 2008    RUL lobectomy after removal of atrial myxoma    PLEURA BIOPSY      RESECTION OF ATRIAL MYXOMA  2007     Social History     Socioeconomic History    Marital status: Single   Occupational History    Occupation: , respiratory therapist, founded [employer]     Comment: Retired   Tobacco Use    Smoking status:  Every Day     Packs/day: 1.00     Years: 35.00     Pack years: 35.00     Types: Cigarettes    Smokeless tobacco: Never   Substance and Sexual Activity    Alcohol use: Not Currently    Drug use: No    Sexual activity: Never     Social Determinants of Health     Financial Resource Strain: High Risk    Difficulty of Paying Living Expenses: Hard   Food Insecurity: No Food Insecurity    Worried About Running Out of Food in the Last Year: Never true    Ran Out of Food in the Last Year: Never true   Transportation Needs: Unmet Transportation Needs    Lack of Transportation (Medical): Yes    Lack of Transportation (Non-Medical): Yes   Physical Activity: Inactive    Days of Exercise per Week: 0 days    Minutes of Exercise per Session: 0 min   Stress: Stress Concern Present    Feeling of Stress : To some extent   Social Connections: Socially Isolated    Frequency of Communication with Friends and Family: More than three times a week    Frequency of Social Gatherings with Friends and Family: More than three times a week    Attends Yazidi Services: Never    Active Member of Clubs or Organizations: No    Attends Club or Organization Meetings: Never    Marital Status: Never    Housing Stability: High Risk    Unable to Pay for Housing in the Last Year: Yes    Number of Places Lived in the Last Year: 1    Unstable Housing in the Last Year: No     Family History   Problem Relation Age of Onset    Hodgkin's lymphoma Mother     Diabetes type II Mother     Kidney failure Father     Diabetes type I Father     Cancer Sister         Allergies  Review of patient's allergies indicates:   Allergen Reactions    Enbrel [etanercept] Shortness Of Breath     CHF    Nsaids (non-steroidal anti-inflammatory drug) Other (See Comments)     hypertention  Other reaction(s): Other (See Comments)  hypertention  HTN    Statins-hmg-coa reductase inhibitors Other (See Comments)     Heart arrythemias  Other reaction(s): Other (See Comments)  Heart  arrythemias  Joint pain and cardiac arrythmias    Pcn [penicillins] Rash       Review of Systems   Review of Systems   Constitutional: Positive for weight loss. Negative for malaise/fatigue.   Eyes:  Negative for visual disturbance.   Cardiovascular:  Negative for chest pain, claudication, cyanosis, dyspnea on exertion, irregular heartbeat, leg swelling, near-syncope, orthopnea, palpitations, paroxysmal nocturnal dyspnea and syncope.   Respiratory:  Negative for hemoptysis, shortness of breath, sleep disturbances due to breathing and wheezing.    Hematologic/Lymphatic: Negative for bleeding problem. Does not bruise/bleed easily.   Skin:  Negative for poor wound healing.   Musculoskeletal:  Negative for muscle cramps and myalgias.   Gastrointestinal:  Negative for abdominal pain, anorexia, diarrhea, heartburn, hematemesis, hematochezia, melena, nausea and vomiting.   Genitourinary:  Negative for hematuria and nocturia.   Neurological:  Negative for excessive daytime sleepiness, dizziness, focal weakness, light-headedness and weakness.     Physical Exam  Vitals:    03/01/23 1404   BP: 110/64   Pulse: 105     Wt Readings from Last 1 Encounters:   03/01/23 94.3 kg (208 lb)     Physical Exam  Constitutional:       General: He is not in acute distress.     Appearance: He is obese. He is not toxic-appearing or diaphoretic.   HENT:      Head: Normocephalic and atraumatic.   Eyes:      General: No scleral icterus.     Conjunctiva/sclera: Conjunctivae normal.   Neck:      Thyroid: No thyromegaly.      Vascular: No carotid bruit, hepatojugular reflux or JVD.   Cardiovascular:      Rate and Rhythm: Normal rate and regular rhythm. No extrasystoles are present.     Chest Wall: PMI is not displaced.      Pulses:           Carotid pulses are 2+ on the right side and 2+ on the left side.       Radial pulses are 2+ on the right side and 2+ on the left side.        Dorsalis pedis pulses are 2+ on the right side and 2+ on the left  side.        Posterior tibial pulses are 2+ on the right side and 2+ on the left side.      Heart sounds: S1 normal and S2 normal. No murmur heard.    Gallop present. S4 sounds present. No S3 sounds.   Pulmonary:      Effort: No accessory muscle usage or respiratory distress.      Breath sounds: No decreased breath sounds, wheezing, rhonchi or rales.   Abdominal:      General: Bowel sounds are normal. There is no abdominal bruit.      Palpations: Abdomen is soft. There is no hepatomegaly, splenomegaly or pulsatile mass.      Tenderness: There is no abdominal tenderness.   Musculoskeletal:         General: No tenderness or deformity.      Right lower leg: No edema.      Left lower leg: No edema.   Skin:     General: Skin is warm and dry.      Coloration: Skin is not pale.      Nails: There is no clubbing.   Neurological:      General: No focal deficit present.      Mental Status: He is alert and oriented to person, place, and time.      Sensory: Sensation is intact.      Motor: Motor function is intact.   Psychiatric:         Speech: Speech normal.         Behavior: Behavior normal. Behavior is cooperative.       Labs  Admission on 01/26/2023, Discharged on 01/29/2023   Component Date Value Ref Range Status    WBC 01/26/2023 8.00  3.90 - 12.70 K/uL Final    RBC 01/26/2023 4.04 (L)  4.60 - 6.20 M/uL Final    Hemoglobin 01/26/2023 12.8 (L)  14.0 - 18.0 g/dL Final    Hematocrit 01/26/2023 38.4 (L)  40.0 - 54.0 % Final    MCV 01/26/2023 95  82 - 98 fL Final    MCH 01/26/2023 31.7 (H)  27.0 - 31.0 pg Final    MCHC 01/26/2023 33.3  32.0 - 36.0 g/dL Final    RDW 01/26/2023 11.9  11.5 - 14.5 % Final    Platelets 01/26/2023 290  150 - 450 K/uL Final    MPV 01/26/2023 10.1  9.2 - 12.9 fL Final    Immature Granulocytes 01/26/2023 0.4  0.0 - 0.5 % Final    Gran # (ANC) 01/26/2023 5.9  1.8 - 7.7 K/uL Final    Immature Grans (Abs) 01/26/2023 0.03  0.00 - 0.04 K/uL Final    Comment: Mild elevation in immature granulocytes is non  specific and   can be seen in a variety of conditions including stress response,   acute inflammation, trauma and pregnancy. Correlation with other   laboratory and clinical findings is essential.      Lymph # 01/26/2023 1.4  1.0 - 4.8 K/uL Final    Mono # 01/26/2023 0.6  0.3 - 1.0 K/uL Final    Eos # 01/26/2023 0.1  0.0 - 0.5 K/uL Final    Baso # 01/26/2023 0.05  0.00 - 0.20 K/uL Final    nRBC 01/26/2023 0  0 /100 WBC Final    Gran % 01/26/2023 73.1 (H)  38.0 - 73.0 % Final    Lymph % 01/26/2023 17.1 (L)  18.0 - 48.0 % Final    Mono % 01/26/2023 8.0  4.0 - 15.0 % Final    Eosinophil % 01/26/2023 0.8  0.0 - 8.0 % Final    Basophil % 01/26/2023 0.6  0.0 - 1.9 % Final    Differential Method 01/26/2023 Automated   Final    Sodium 01/26/2023 133 (L)  136 - 145 mmol/L Final    Potassium 01/26/2023 4.8  3.5 - 5.1 mmol/L Final    Chloride 01/26/2023 97  95 - 110 mmol/L Final    CO2 01/26/2023 25  23 - 29 mmol/L Final    Glucose 01/26/2023 229 (H)  70 - 110 mg/dL Final    BUN 01/26/2023 9  8 - 23 mg/dL Final    Creatinine 01/26/2023 0.8  0.5 - 1.4 mg/dL Final    Calcium 01/26/2023 9.3  8.7 - 10.5 mg/dL Final    Total Protein 01/26/2023 7.0  6.0 - 8.4 g/dL Final    Albumin 01/26/2023 2.8 (L)  3.5 - 5.2 g/dL Final    Total Bilirubin 01/26/2023 0.4  0.1 - 1.0 mg/dL Final    Comment: For infants and newborns, interpretation of results should be based  on gestational age, weight and in agreement with clinical  observations.    Premature Infant recommended reference ranges:  Up to 24 hours.............<8.0 mg/dL  Up to 48 hours............<12.0 mg/dL  3-5 days..................<15.0 mg/dL  6-29 days.................<15.0 mg/dL      Alkaline Phosphatase 01/26/2023 90  55 - 135 U/L Final    AST 01/26/2023 15  10 - 40 U/L Final    ALT 01/26/2023 16  10 - 44 U/L Final    Anion Gap 01/26/2023 11  8 - 16 mmol/L Final    eGFR 01/26/2023 >60  >60 mL/min/1.73 m^2 Final    Specimen UA 01/26/2023 Urine, Clean Catch   Final    Color, UA  01/26/2023 Yellow  Yellow, Straw, Trice Final    Appearance, UA 01/26/2023 Clear  Clear Final    pH, UA 01/26/2023 7.0  5.0 - 8.0 Final    Specific Gravity, UA 01/26/2023 1.030  1.005 - 1.030 Final    Protein, UA 01/26/2023 Negative  Negative Final    Comment: Recommend a 24 hour urine protein or a urine   protein/creatinine ratio if globulin induced proteinuria is  clinically suspected.      Glucose, UA 01/26/2023 Negative  Negative Final    Ketones, UA 01/26/2023 2+ (A)  Negative Final    Bilirubin (UA) 01/26/2023 Negative  Negative Final    Occult Blood UA 01/26/2023 Negative  Negative Final    Nitrite, UA 01/26/2023 Negative  Negative Final    Urobilinogen, UA 01/26/2023 Negative  <2.0 EU/dL Final    Leukocytes, UA 01/26/2023 Negative  Negative Final    Blood Culture, Routine 01/26/2023 No growth after 5 days.   Final    Blood Culture, Routine 01/26/2023 No growth after 5 days.   Final    Sodium 01/27/2023 134 (L)  136 - 145 mmol/L Final    Potassium 01/27/2023 4.0  3.5 - 5.1 mmol/L Final    Chloride 01/27/2023 98  95 - 110 mmol/L Final    CO2 01/27/2023 27  23 - 29 mmol/L Final    Glucose 01/27/2023 178 (H)  70 - 110 mg/dL Final    BUN 01/27/2023 8  8 - 23 mg/dL Final    Creatinine 01/27/2023 0.7  0.5 - 1.4 mg/dL Final    Calcium 01/27/2023 9.0  8.7 - 10.5 mg/dL Final    Anion Gap 01/27/2023 9  8 - 16 mmol/L Final    eGFR 01/27/2023 >60  >60 mL/min/1.73 m^2 Final    Magnesium 01/27/2023 2.0  1.6 - 2.6 mg/dL Final    WBC 01/27/2023 7.61  3.90 - 12.70 K/uL Final    RBC 01/27/2023 3.76 (L)  4.60 - 6.20 M/uL Final    Hemoglobin 01/27/2023 11.9 (L)  14.0 - 18.0 g/dL Final    Hematocrit 01/27/2023 35.6 (L)  40.0 - 54.0 % Final    MCV 01/27/2023 95  82 - 98 fL Final    MCH 01/27/2023 31.6 (H)  27.0 - 31.0 pg Final    MCHC 01/27/2023 33.4  32.0 - 36.0 g/dL Final    RDW 01/27/2023 11.9  11.5 - 14.5 % Final    Platelets 01/27/2023 274  150 - 450 K/uL Final    MPV 01/27/2023 11.3  9.2 - 12.9 fL Final    Immature  Granulocytes 01/27/2023 0.5  0.0 - 0.5 % Final    Gran # (ANC) 01/27/2023 4.8  1.8 - 7.7 K/uL Final    Immature Grans (Abs) 01/27/2023 0.04  0.00 - 0.04 K/uL Final    Comment: Mild elevation in immature granulocytes is non specific and   can be seen in a variety of conditions including stress response,   acute inflammation, trauma and pregnancy. Correlation with other   laboratory and clinical findings is essential.      Lymph # 01/27/2023 1.7  1.0 - 4.8 K/uL Final    Mono # 01/27/2023 0.9  0.3 - 1.0 K/uL Final    Eos # 01/27/2023 0.2  0.0 - 0.5 K/uL Final    Baso # 01/27/2023 0.04  0.00 - 0.20 K/uL Final    nRBC 01/27/2023 0  0 /100 WBC Final    Gran % 01/27/2023 63.6  38.0 - 73.0 % Final    Lymph % 01/27/2023 21.9  18.0 - 48.0 % Final    Mono % 01/27/2023 11.3  4.0 - 15.0 % Final    Eosinophil % 01/27/2023 2.2  0.0 - 8.0 % Final    Basophil % 01/27/2023 0.5  0.0 - 1.9 % Final    Differential Method 01/27/2023 Automated   Final    POCT Glucose 01/27/2023 166 (H)  70 - 110 mg/dL Final    SARS-CoV-2 RNA, Amplification, Qual 01/27/2023 Negative  Negative Final    Comment: This test utilizes isothermal nucleic acid amplification technology   to   detect the SARS-CoV-2 RdRp nucleic acid segment. The analytical   sensitivity   (limit of detection) is 500 copies/swab.     A POSITIVE result is indicative of the presence of SARS-CoV-2 RNA;   clinical   correlation with patient history and other diagnostic information is   necessary to determine patient infection status.    A NEGATIVE result means that SARS-CoV-2 nucleic acids are not present   above   the limit of detection. A NEGATIVE result should be treated as   presumptive.   It does not rule out the possibility of COVID-19 and should not be   the sole   basis for treatment decisions. If COVID-19 is strongly suspected   based on   clinical and exposure history, re-testing using an alternate   molecular assay   should be considered.     This test is only for use under  the Food and Drug Administration s   Emergency   Use Authorization (EUA).     Commercial kits are provided by [employer]. Performanc                           e   characteristics of the EUA have been independently verified by   [facility] Department of Pathology and Laboratory Medicine.   _________________________________________________________________   The authorized Fact Sheet for Healthcare Providers and the authorized   Fact   Sheet for Patients of the ID NOW COVID-19 are available on the FDA   website:   https://www.fda.gov/media/323268/download   https://www.fda.gov/media/076316/download      POCT Glucose 01/27/2023 182 (H)  70 - 110 mg/dL Final    POCT Glucose 01/27/2023 113 (H)  70 - 110 mg/dL Final    POCT Glucose 01/27/2023 173 (H)  70 - 110 mg/dL Final    POCT Glucose 01/27/2023 161 (H)  70 - 110 mg/dL Final    Sodium 01/28/2023 135 (L)  136 - 145 mmol/L Final    Potassium 01/28/2023 4.4  3.5 - 5.1 mmol/L Final    Chloride 01/28/2023 100  95 - 110 mmol/L Final    CO2 01/28/2023 24  23 - 29 mmol/L Final    Glucose 01/28/2023 159 (H)  70 - 110 mg/dL Final    BUN 01/28/2023 11  8 - 23 mg/dL Final    Creatinine 01/28/2023 0.8  0.5 - 1.4 mg/dL Final    Calcium 01/28/2023 9.7  8.7 - 10.5 mg/dL Final    Anion Gap 01/28/2023 11  8 - 16 mmol/L Final    eGFR 01/28/2023 >60  >60 mL/min/1.73 m^2 Final    Magnesium 01/28/2023 2.2  1.6 - 2.6 mg/dL Final    WBC 01/28/2023 7.32  3.90 - 12.70 K/uL Final    RBC 01/28/2023 4.08 (L)  4.60 - 6.20 M/uL Final    Hemoglobin 01/28/2023 12.7 (L)  14.0 - 18.0 g/dL Final    Hematocrit 01/28/2023 38.4 (L)  40.0 - 54.0 % Final    MCV 01/28/2023 94  82 - 98 fL Final    MCH 01/28/2023 31.1 (H)  27.0 - 31.0 pg Final    MCHC 01/28/2023 33.1  32.0 - 36.0 g/dL Final    RDW 01/28/2023 11.7  11.5 - 14.5 % Final    Platelets 01/28/2023 333  150 - 450 K/uL Final    MPV 01/28/2023 10.4  9.2 - 12.9 fL Final    Immature Granulocytes 01/28/2023 0.5  0.0 - 0.5 % Final    Gran  # (ANC) 01/28/2023 6.0  1.8 - 7.7 K/uL Final    Immature Grans (Abs) 01/28/2023 0.04  0.00 - 0.04 K/uL Final    Comment: Mild elevation in immature granulocytes is non specific and   can be seen in a variety of conditions including stress response,   acute inflammation, trauma and pregnancy. Correlation with other   laboratory and clinical findings is essential.      Lymph # 01/28/2023 0.9 (L)  1.0 - 4.8 K/uL Final    Mono # 01/28/2023 0.3  0.3 - 1.0 K/uL Final    Eos # 01/28/2023 0.0  0.0 - 0.5 K/uL Final    Baso # 01/28/2023 0.01  0.00 - 0.20 K/uL Final    nRBC 01/28/2023 0  0 /100 WBC Final    Gran % 01/28/2023 82.1 (H)  38.0 - 73.0 % Final    Lymph % 01/28/2023 12.8 (L)  18.0 - 48.0 % Final    Mono % 01/28/2023 4.5  4.0 - 15.0 % Final    Eosinophil % 01/28/2023 0.0  0.0 - 8.0 % Final    Basophil % 01/28/2023 0.1  0.0 - 1.9 % Final    Differential Method 01/28/2023 Automated   Final    POCT Glucose 01/28/2023 157 (H)  70 - 110 mg/dL Final    POCT Glucose 01/28/2023 289 (H)  70 - 110 mg/dL Final    POCT Glucose 01/28/2023 211 (H)  70 - 110 mg/dL Final    POCT Glucose 01/28/2023 192 (H)  70 - 110 mg/dL Final    POCT Glucose 01/28/2023 187 (H)  70 - 110 mg/dL Final    Sodium 01/29/2023 136  136 - 145 mmol/L Final    Potassium 01/29/2023 4.3  3.5 - 5.1 mmol/L Final    Chloride 01/29/2023 102  95 - 110 mmol/L Final    CO2 01/29/2023 25  23 - 29 mmol/L Final    Glucose 01/29/2023 234 (H)  70 - 110 mg/dL Final    BUN 01/29/2023 20  8 - 23 mg/dL Final    Creatinine 01/29/2023 0.9  0.5 - 1.4 mg/dL Final    Calcium 01/29/2023 8.5 (L)  8.7 - 10.5 mg/dL Final    Anion Gap 01/29/2023 9  8 - 16 mmol/L Final    eGFR 01/29/2023 >60  >60 mL/min/1.73 m^2 Final    Magnesium 01/29/2023 2.4  1.6 - 2.6 mg/dL Final    WBC 01/29/2023 7.29  3.90 - 12.70 K/uL Final    RBC 01/29/2023 3.76 (L)  4.60 - 6.20 M/uL Final    Hemoglobin 01/29/2023 11.7 (L)  14.0 - 18.0 g/dL Final    Hematocrit 01/29/2023 35.4 (L)  40.0 - 54.0 % Final    MCV  01/29/2023 94  82 - 98 fL Final    MCH 01/29/2023 31.1 (H)  27.0 - 31.0 pg Final    MCHC 01/29/2023 33.1  32.0 - 36.0 g/dL Final    RDW 01/29/2023 11.7  11.5 - 14.5 % Final    Platelets 01/29/2023 308  150 - 450 K/uL Final    MPV 01/29/2023 10.9  9.2 - 12.9 fL Final    Immature Granulocytes 01/29/2023 0.8 (H)  0.0 - 0.5 % Final    Gran # (ANC) 01/29/2023 5.7  1.8 - 7.7 K/uL Final    Immature Grans (Abs) 01/29/2023 0.06 (H)  0.00 - 0.04 K/uL Final    Comment: Mild elevation in immature granulocytes is non specific and   can be seen in a variety of conditions including stress response,   acute inflammation, trauma and pregnancy. Correlation with other   laboratory and clinical findings is essential.      Lymph # 01/29/2023 1.1  1.0 - 4.8 K/uL Final    Mono # 01/29/2023 0.4  0.3 - 1.0 K/uL Final    Eos # 01/29/2023 0.0  0.0 - 0.5 K/uL Final    Baso # 01/29/2023 0.02  0.00 - 0.20 K/uL Final    nRBC 01/29/2023 0  0 /100 WBC Final    Gran % 01/29/2023 77.8 (H)  38.0 - 73.0 % Final    Lymph % 01/29/2023 15.2 (L)  18.0 - 48.0 % Final    Mono % 01/29/2023 5.6  4.0 - 15.0 % Final    Eosinophil % 01/29/2023 0.3  0.0 - 8.0 % Final    Basophil % 01/29/2023 0.3  0.0 - 1.9 % Final    Differential Method 01/29/2023 Automated   Final    POCT Glucose 01/29/2023 241 (H)  70 - 110 mg/dL Final   Lab Visit on 01/26/2023   Component Date Value Ref Range Status    WBC 01/26/2023 8.09  3.90 - 12.70 K/uL Final    RBC 01/26/2023 3.86 (L)  4.60 - 6.20 M/uL Final    Hemoglobin 01/26/2023 11.9 (L)  14.0 - 18.0 g/dL Final    Hematocrit 01/26/2023 37.2 (L)  40.0 - 54.0 % Final    MCV 01/26/2023 96  82 - 98 fL Final    MCH 01/26/2023 30.8  27.0 - 31.0 pg Final    MCHC 01/26/2023 32.0  32.0 - 36.0 g/dL Final    RDW 01/26/2023 12.1  11.5 - 14.5 % Final    Platelets 01/26/2023 302  150 - 450 K/uL Final    MPV 01/26/2023 12.0  9.2 - 12.9 fL Final    Immature Granulocytes 01/26/2023 0.4  0.0 - 0.5 % Final    Gran # (ANC) 01/26/2023 5.1  1.8 - 7.7 K/uL  Final    Immature Grans (Abs) 01/26/2023 0.03  0.00 - 0.04 K/uL Final    Comment: Mild elevation in immature granulocytes is non specific and   can be seen in a variety of conditions including stress response,   acute inflammation, trauma and pregnancy. Correlation with other   laboratory and clinical findings is essential.      Lymph # 01/26/2023 1.8  1.0 - 4.8 K/uL Final    Mono # 01/26/2023 0.9  0.3 - 1.0 K/uL Final    Eos # 01/26/2023 0.3  0.0 - 0.5 K/uL Final    Baso # 01/26/2023 0.05  0.00 - 0.20 K/uL Final    nRBC 01/26/2023 0  0 /100 WBC Final    Gran % 01/26/2023 62.6  38.0 - 73.0 % Final    Lymph % 01/26/2023 22.1  18.0 - 48.0 % Final    Mono % 01/26/2023 10.5  4.0 - 15.0 % Final    Eosinophil % 01/26/2023 3.8  0.0 - 8.0 % Final    Basophil % 01/26/2023 0.6  0.0 - 1.9 % Final    Differential Method 01/26/2023 Automated   Final    Sodium 01/26/2023 135 (L)  136 - 145 mmol/L Final    Potassium 01/26/2023 4.2  3.5 - 5.1 mmol/L Final    Chloride 01/26/2023 100  95 - 110 mmol/L Final    CO2 01/26/2023 25  23 - 29 mmol/L Final    Glucose 01/26/2023 85  70 - 110 mg/dL Final    BUN 01/26/2023 9  8 - 23 mg/dL Final    Creatinine 01/26/2023 0.9  0.5 - 1.4 mg/dL Final    Calcium 01/26/2023 8.9  8.7 - 10.5 mg/dL Final    Total Protein 01/26/2023 6.7  6.0 - 8.4 g/dL Final    Albumin 01/26/2023 2.7 (L)  3.5 - 5.2 g/dL Final    Total Bilirubin 01/26/2023 0.4  0.1 - 1.0 mg/dL Final    Comment: For infants and newborns, interpretation of results should be based  on gestational age, weight and in agreement with clinical  observations.    Premature Infant recommended reference ranges:  Up to 24 hours.............<8.0 mg/dL  Up to 48 hours............<12.0 mg/dL  3-5 days..................<15.0 mg/dL  6-29 days.................<15.0 mg/dL      Alkaline Phosphatase 01/26/2023 93  55 - 135 U/L Final    AST 01/26/2023 16  10 - 40 U/L Final    ALT 01/26/2023 15  10 - 44 U/L Final    Anion Gap 01/26/2023 10  8 - 16 mmol/L Final     eGFR 01/26/2023 >60.0  >60 mL/min/1.73 m^2 Final   No results displayed because visit has over 200 results.      No results displayed because visit has over 200 results.      No results displayed because visit has over 200 results.      Hospital Outpatient Visit on 10/28/2022   Component Date Value Ref Range Status    BSA 10/28/2022 2.38  m2 Final    LA WIDTH 10/28/2022 3.60  cm Final    IVC diameter 10/28/2022 1.46  cm Final    Left Ventricular Outflow Tract Misti* 10/28/2022 0.61  cm/s Final    Left Ventricular Outflow Tract Misti* 10/28/2022 1.70  mmHg Final    PV PEAK VELOCITY 10/28/2022 0.70  cm/s Final    LVIDd 10/28/2022 4.25  3.5 - 6.0 cm Final    IVS 10/28/2022 1.42 (A)  0.6 - 1.1 cm Final    Posterior Wall 10/28/2022 1.11 (A)  0.6 - 1.1 cm Final    LVIDs 10/28/2022 3.07  2.1 - 4.0 cm Final    FS 10/28/2022 28  28 - 44 % Final    LA volume 10/28/2022 64.93  cm3 Final    LV mass 10/28/2022 196.04  g Final    LA size 10/28/2022 3.93  cm Final    TAPSE 10/28/2022 1.74  cm Final    Left Ventricle Relative Wall Thick* 10/28/2022 0.52  cm Final    AV mean gradient 10/28/2022 2  mmHg Final    AV valve area 10/28/2022 2.81  cm2 Final    AV Velocity Ratio 10/28/2022 0.89   Final    AV index (prosthetic) 10/28/2022 0.88   Final    MV valve area p 1/2 method 10/28/2022 3.83  cm2 Final    E/A ratio 10/28/2022 0.63   Final    E wave deceleration time 10/28/2022 198.06  msec Final    IVRT 10/28/2022 55.36  msec Final    Pulm vein S/D ratio 10/28/2022 0.62   Final    LVOT diameter 10/28/2022 2.02  cm Final    LVOT area 10/28/2022 3.2  cm2 Final    LVOT peak lauren 10/28/2022 0.93  m/s Final    LVOT peak VTI 10/28/2022 13.50  cm Final    Ao peak lauren 10/28/2022 1.04  m/s Final    Ao VTI 10/28/2022 15.4  cm Final    LVOT stroke volume 10/28/2022 43.24  cm3 Final    AV peak gradient 10/28/2022 4  mmHg Final    MV Peak E Lauren 10/28/2022 0.53  m/s Final    TR Max Lauren 10/28/2022 2.01  m/s Final    MV stenosis pressure 1/2 time  10/28/2022 57.44  ms Final    MV Peak A Ildefonso 10/28/2022 0.84  m/s Final    PV Peak S Ildefonso 10/28/2022 0.31  m/s Final    PV Peak D Ildefonso 10/28/2022 0.50  m/s Final    LV Systolic Volume 10/28/2022 36.99  mL Final    LV Systolic Volume Index 10/28/2022 15.9  mL/m2 Final    LV Diastolic Volume 10/28/2022 80.96  mL Final    LV Diastolic Volume Index 10/28/2022 34.90  mL/m2 Final    LA Volume Index 10/28/2022 28.0  mL/m2 Final    LV Mass Index 10/28/2022 84  g/m2 Final    RA Major Axis 10/28/2022 4.13  cm Final    Left Atrium Minor Axis 10/28/2022 5.46  cm Final    Left Atrium Major Axis 10/28/2022 5.34  cm Final    Triscuspid Valve Regurgitation Pea* 10/28/2022 16  mmHg Final    LA Volume Index (Mod) 10/28/2022 26.3  mL/m2 Final    LA volume (mod) 10/28/2022 61.00  cm3 Final    RA Width 10/28/2022 2.40  cm Final    Right Atrial Pressure (from IVC) 10/28/2022 3  mmHg Final    EF 10/28/2022 55  % Final    TV rest pulmonary artery pressure 10/28/2022 19  mmHg Final   Admission on 10/26/2022, Discharged on 10/27/2022   Component Date Value Ref Range Status    BNP 10/26/2022 79  0 - 99 pg/mL Final    Values of less than 100 pg/ml are consistent with non-CHF populations.    WBC 10/26/2022 10.12  3.90 - 12.70 K/uL Final    RBC 10/26/2022 5.56  4.60 - 6.20 M/uL Final    Hemoglobin 10/26/2022 19.7 (H)  14.0 - 18.0 g/dL Final    Hematocrit 10/26/2022 54.6 (H)  40.0 - 54.0 % Final    MCV 10/26/2022 98  82 - 98 fL Final    MCH 10/26/2022 35.4 (H)  27.0 - 31.0 pg Final    MCHC 10/26/2022 36.1 (H)  32.0 - 36.0 g/dL Final    RDW 10/26/2022 12.8  11.5 - 14.5 % Final    Platelets 10/26/2022 195  150 - 450 K/uL Final    MPV 10/26/2022 11.2  9.2 - 12.9 fL Final    Immature Granulocytes 10/26/2022 1.5 (H)  0.0 - 0.5 % Final    Gran # (ANC) 10/26/2022 6.9  1.8 - 7.7 K/uL Final    Immature Grans (Abs) 10/26/2022 0.15 (H)  0.00 - 0.04 K/uL Final    Comment: Mild elevation in immature granulocytes is non specific and   can be seen in a variety of  conditions including stress response,   acute inflammation, trauma and pregnancy. Correlation with other   laboratory and clinical findings is essential.      Lymph # 10/26/2022 2.1  1.0 - 4.8 K/uL Final    Mono # 10/26/2022 0.8  0.3 - 1.0 K/uL Final    Eos # 10/26/2022 0.1  0.0 - 0.5 K/uL Final    Baso # 10/26/2022 0.06  0.00 - 0.20 K/uL Final    nRBC 10/26/2022 0  0 /100 WBC Final    Gran % 10/26/2022 68.2  38.0 - 73.0 % Final    Lymph % 10/26/2022 21.0  18.0 - 48.0 % Final    Mono % 10/26/2022 8.1  4.0 - 15.0 % Final    Eosinophil % 10/26/2022 0.6  0.0 - 8.0 % Final    Basophil % 10/26/2022 0.6  0.0 - 1.9 % Final    Differential Method 10/26/2022 Automated   Final    Sodium 10/26/2022 135 (L)  136 - 145 mmol/L Final    Potassium 10/26/2022 4.3  3.5 - 5.1 mmol/L Final    Chloride 10/26/2022 99  95 - 110 mmol/L Final    CO2 10/26/2022 24  23 - 29 mmol/L Final    Glucose 10/26/2022 193 (H)  70 - 110 mg/dL Final    BUN 10/26/2022 21  8 - 23 mg/dL Final    Creatinine 10/26/2022 1.3  0.5 - 1.4 mg/dL Final    Calcium 10/26/2022 9.4  8.7 - 10.5 mg/dL Final    Total Protein 10/26/2022 6.7  6.0 - 8.4 g/dL Final    Albumin 10/26/2022 3.5  3.5 - 5.2 g/dL Final    Total Bilirubin 10/26/2022 0.3  0.1 - 1.0 mg/dL Final    Comment: For infants and newborns, interpretation of results should be based  on gestational age, weight and in agreement with clinical  observations.    Premature Infant recommended reference ranges:  Up to 24 hours.............<8.0 mg/dL  Up to 48 hours............<12.0 mg/dL  3-5 days..................<15.0 mg/dL  6-29 days.................<15.0 mg/dL      Alkaline Phosphatase 10/26/2022 87  55 - 135 U/L Final    AST 10/26/2022 27  10 - 40 U/L Final    ALT 10/26/2022 52 (H)  10 - 44 U/L Final    Anion Gap 10/26/2022 12  8 - 16 mmol/L Final    eGFR 10/26/2022 59 (A)  >60 mL/min/1.73 m^2 Final    D-Dimer 10/26/2022 0.40  <0.50 mg/L FEU Final    Comment: The quantitative D-dimer assay should be used as an aid  in   the diagnosis of deep vein thrombosis and pulmonary embolism  in patients with the appropriate presentation and clinical  history. The upper limit of the reference interval and the clinical   cut off   point are identical. Causes of a positive (>0.50 mg/L FEU) D-Dimer   test  include, but are not limited to: DVT, PE, DIC, thrombolytic   therapy, anticoagulant therapy, recent surgery, trauma, or   pregnancy, disseminated malignancy, aortic aneurysm, cirrhosis,  and severe infection. False negative results may occur in   patients with distal DVT.  JOCELYN^612^^3^  LOT^610^DDiSup^[id number]\DDiBuf^[id number]\DDiReag^[id number]      aPTT 10/26/2022 22.6  21.0 - 32.0 sec Final    Comment: aPTT therapeutic range = 39-69 seconds  LOT^050^APTT FSL^[id number]      Prothrombin Time 10/26/2022 10.8  9.0 - 12.5 sec Final    INR 10/26/2022 1.0  0.8 - 1.2 Final    Comment: Coumadin Therapy:  2.0 - 3.0 for INR for all indicators except mechanical heart valves  and antiphospholipid syndromes which should use 2.5 - 3.5.  LOT^040^PT Inn^[id number]      Magnesium 10/26/2022 2.0  1.6 - 2.6 mg/dL Final    Troponin I 10/26/2022 0.035 (H)  0.000 - 0.026 ng/mL Final    Comment: The reference interval for Troponin I represents the 99th percentile   cutoff   for our facility and is consistent with 3rd generation assay   performance.      TSH 10/26/2022 4.602 (H)  0.400 - 4.000 uIU/mL Final    Free T4 10/26/2022 1.03  0.71 - 1.51 ng/dL Final    POC Rapid COVID 10/26/2022 Negative  Negative Final     Acceptable 10/26/2022 Yes   Final    POC Molecular Influenza A Ag 10/26/2022 Negative  Negative, Not Reported Final    POC Molecular Influenza B Ag 10/26/2022 Negative  Negative, Not Reported Final     Acceptable 10/26/2022 Yes   Final    Lactate (Lactic Acid) 10/26/2022 1.6  0.5 - 2.2 mmol/L Final    Comment: Falsely low lactic acid results can be found in samples   containing >=13.0 mg/dL total bilirubin and/or >=3.5 mg/dL   direct  bilirubin.  Specimen slightly hemolyzed     Lab Visit on 10/21/2022   Component Date Value Ref Range Status    WBC 10/21/2022 10.23  3.90 - 12.70 K/uL Final    RBC 10/21/2022 5.49  4.60 - 6.20 M/uL Final    Hemoglobin 10/21/2022 18.6 (H)  14.0 - 18.0 g/dL Final    Hematocrit 10/21/2022 56.8 (H)  40.0 - 54.0 % Final    MCV 10/21/2022 104 (H)  82 - 98 fL Final    MCH 10/21/2022 33.9 (H)  27.0 - 31.0 pg Final    MCHC 10/21/2022 32.7  32.0 - 36.0 g/dL Final    RDW 10/21/2022 12.9  11.5 - 14.5 % Final    Platelets 10/21/2022 180  150 - 450 K/uL Final    MPV 10/21/2022 11.6  9.2 - 12.9 fL Final    Immature Granulocytes 10/21/2022 1.2 (H)  0.0 - 0.5 % Final    Gran # (ANC) 10/21/2022 8.1 (H)  1.8 - 7.7 K/uL Final    Immature Grans (Abs) 10/21/2022 0.12 (H)  0.00 - 0.04 K/uL Final    Comment: Mild elevation in immature granulocytes is non specific and   can be seen in a variety of conditions including stress response,   acute inflammation, trauma and pregnancy. Correlation with other   laboratory and clinical findings is essential.      Lymph # 10/21/2022 1.1  1.0 - 4.8 K/uL Final    Mono # 10/21/2022 0.8  0.3 - 1.0 K/uL Final    Eos # 10/21/2022 0.0  0.0 - 0.5 K/uL Final    Baso # 10/21/2022 0.03  0.00 - 0.20 K/uL Final    nRBC 10/21/2022 0  0 /100 WBC Final    Gran % 10/21/2022 79.3 (H)  38.0 - 73.0 % Final    Lymph % 10/21/2022 11.1 (L)  18.0 - 48.0 % Final    Mono % 10/21/2022 7.8  4.0 - 15.0 % Final    Eosinophil % 10/21/2022 0.3  0.0 - 8.0 % Final    Basophil % 10/21/2022 0.3  0.0 - 1.9 % Final    Differential Method 10/21/2022 Automated   Final    CRP 10/21/2022 8.8 (H)  0.0 - 8.2 mg/L Final    Sodium 10/21/2022 139  136 - 145 mmol/L Final    Potassium 10/21/2022 5.4 (H)  3.5 - 5.1 mmol/L Final    Chloride 10/21/2022 97  95 - 110 mmol/L Final    CO2 10/21/2022 28  23 - 29 mmol/L Final    Glucose 10/21/2022 190 (H)  70 - 110 mg/dL Final    BUN 10/21/2022 27 (H)  8 - 23 mg/dL Final    Creatinine 10/21/2022 1.3  0.5 -  1.4 mg/dL Final    Calcium 10/21/2022 9.6  8.7 - 10.5 mg/dL Final    Total Protein 10/21/2022 7.2  6.0 - 8.4 g/dL Final    Albumin 10/21/2022 3.9  3.5 - 5.2 g/dL Final    Total Bilirubin 10/21/2022 0.6  0.1 - 1.0 mg/dL Final    Comment: For infants and newborns, interpretation of results should be based  on gestational age, weight and in agreement with clinical  observations.    Premature Infant recommended reference ranges:  Up to 24 hours.............<8.0 mg/dL  Up to 48 hours............<12.0 mg/dL  3-5 days..................<15.0 mg/dL  6-29 days.................<15.0 mg/dL      Alkaline Phosphatase 10/21/2022 99  55 - 135 U/L Final    AST 10/21/2022 29  10 - 40 U/L Final    ALT 10/21/2022 49 (H)  10 - 44 U/L Final    Anion Gap 10/21/2022 14  8 - 16 mmol/L Final    eGFR 10/21/2022 59 (A)  >60 mL/min/1.73 m^2 Final    Sed Rate 10/21/2022 3  0 - 10 mm/Hr Final    Vit D, 25-Hydroxy 10/21/2022 20 (L)  30 - 96 ng/mL Final    Comment: Vitamin D deficiency.........<10 ng/mL                              Vitamin D insufficiency......10-29 ng/mL       Vitamin D sufficiency........> or equal to 30 ng/mL  Vitamin D toxicity............>100 ng/mL      Creatinine 10/21/2022 1.3  0.5 - 1.4 mg/dL Final    eGFR 10/21/2022 59 (A)  >60 mL/min/1.73 m^2 Final    Sodium 10/21/2022 139  136 - 145 mmol/L Final    Potassium 10/21/2022 5.4 (H)  3.5 - 5.1 mmol/L Final    Chloride 10/21/2022 97  95 - 110 mmol/L Final    CO2 10/21/2022 28  23 - 29 mmol/L Final    Glucose 10/21/2022 190 (H)  70 - 110 mg/dL Final    BUN 10/21/2022 27 (H)  8 - 23 mg/dL Final    Creatinine 10/21/2022 1.3  0.5 - 1.4 mg/dL Final    Calcium 10/21/2022 9.6  8.7 - 10.5 mg/dL Final    Total Protein 10/21/2022 7.2  6.0 - 8.4 g/dL Final    Albumin 10/21/2022 3.9  3.5 - 5.2 g/dL Final    Total Bilirubin 10/21/2022 0.6  0.1 - 1.0 mg/dL Final    Comment: For infants and newborns, interpretation of results should be based  on gestational age, weight and in agreement with  clinical  observations.    Premature Infant recommended reference ranges:  Up to 24 hours.............<8.0 mg/dL  Up to 48 hours............<12.0 mg/dL  3-5 days..................<15.0 mg/dL  6-29 days.................<15.0 mg/dL      Alkaline Phosphatase 10/21/2022 99  55 - 135 U/L Final    AST 10/21/2022 29  10 - 40 U/L Final    ALT 10/21/2022 49 (H)  10 - 44 U/L Final    Anion Gap 10/21/2022 14  8 - 16 mmol/L Final    eGFR 10/21/2022 59 (A)  >60 mL/min/1.73 m^2 Final    WBC 10/21/2022 10.23  3.90 - 12.70 K/uL Final    RBC 10/21/2022 5.49  4.60 - 6.20 M/uL Final    Hemoglobin 10/21/2022 18.6 (H)  14.0 - 18.0 g/dL Final    Hematocrit 10/21/2022 56.8 (H)  40.0 - 54.0 % Final    MCV 10/21/2022 104 (H)  82 - 98 fL Final    MCH 10/21/2022 33.9 (H)  27.0 - 31.0 pg Final    MCHC 10/21/2022 32.7  32.0 - 36.0 g/dL Final    RDW 10/21/2022 12.9  11.5 - 14.5 % Final    Platelets 10/21/2022 180  150 - 450 K/uL Final    MPV 10/21/2022 11.6  9.2 - 12.9 fL Final    Immature Granulocytes 10/21/2022 1.2 (H)  0.0 - 0.5 % Final    Gran # (ANC) 10/21/2022 8.1 (H)  1.8 - 7.7 K/uL Final    Immature Grans (Abs) 10/21/2022 0.12 (H)  0.00 - 0.04 K/uL Final    Comment: Mild elevation in immature granulocytes is non specific and   can be seen in a variety of conditions including stress response,   acute inflammation, trauma and pregnancy. Correlation with other   laboratory and clinical findings is essential.      Lymph # 10/21/2022 1.1  1.0 - 4.8 K/uL Final    Mono # 10/21/2022 0.8  0.3 - 1.0 K/uL Final    Eos # 10/21/2022 0.0  0.0 - 0.5 K/uL Final    Baso # 10/21/2022 0.03  0.00 - 0.20 K/uL Final    nRBC 10/21/2022 0  0 /100 WBC Final    Gran % 10/21/2022 79.3 (H)  38.0 - 73.0 % Final    Lymph % 10/21/2022 11.1 (L)  18.0 - 48.0 % Final    Mono % 10/21/2022 7.8  4.0 - 15.0 % Final    Eosinophil % 10/21/2022 0.3  0.0 - 8.0 % Final    Basophil % 10/21/2022 0.3  0.0 - 1.9 % Final    Differential Method 10/21/2022 Automated   Final    CRP  10/21/2022 8.8 (H)  0.0 - 8.2 mg/L Final    Sed Rate 10/21/2022 3  0 - 10 mm/Hr Final    Specimen UA 10/21/2022 Urine, Clean Catch   Final    Color, UA 10/21/2022 Yellow  Yellow, Straw, Trice Final    Appearance, UA 10/21/2022 Clear  Clear Final    pH, UA 10/21/2022 6.0  5.0 - 8.0 Final    Specific Gravity, UA 10/21/2022 1.025  1.005 - 1.030 Final    Protein, UA 10/21/2022 Negative  Negative Final    Comment: Recommend a 24 hour urine protein or a urine   protein/creatinine ratio if globulin induced proteinuria is  clinically suspected.      Glucose, UA 10/21/2022 Negative  Negative Final    Ketones, UA 10/21/2022 Trace (A)  Negative Final    Bilirubin (UA) 10/21/2022 Negative  Negative Final    Occult Blood UA 10/21/2022 Negative  Negative Final    Nitrite, UA 10/21/2022 Negative  Negative Final    Urobilinogen, UA 10/21/2022 Negative  <2.0 EU/dL Final    Leukocytes, UA 10/21/2022 Negative  Negative Final   Admission on 10/20/2022, Discharged on 10/20/2022   Component Date Value Ref Range Status    Troponin I 10/20/2022 0.013  0.000 - 0.026 ng/mL Final    Comment: The reference interval for Troponin I represents the 99th percentile   cutoff   for our facility and is consistent with 3rd generation assay   performance.      Lactate (Lactic Acid) 10/20/2022 1.9  0.5 - 2.2 mmol/L Final    Comment: Falsely low lactic acid results can be found in samples   containing >=13.0 mg/dL total bilirubin and/or >=3.5 mg/dL   direct bilirubin.      WBC 10/20/2022 11.21  3.90 - 12.70 K/uL Final    RBC 10/20/2022 5.55  4.60 - 6.20 M/uL Final    Hemoglobin 10/20/2022 18.9 (H)  14.0 - 18.0 g/dL Final    Hematocrit 10/20/2022 54.3 (H)  40.0 - 54.0 % Final    MCV 10/20/2022 98  82 - 98 fL Final    MCH 10/20/2022 34.1 (H)  27.0 - 31.0 pg Final    MCHC 10/20/2022 34.8  32.0 - 36.0 g/dL Final    RDW 10/20/2022 12.8  11.5 - 14.5 % Final    Platelets 10/20/2022 116 (L)  150 - 450 K/uL Final    MPV 10/20/2022 12.1  9.2 - 12.9 fL Final     Immature Granulocytes 10/20/2022 Test Not Performed  0.0 - 0.5 % Corrected    CORRECTED RESULT; previously reported as 0.9 on 10/20/2022 at 03:07.    Gran # (ANC) 10/20/2022 Test Not Performed  1.8 - 7.7 K/uL Corrected    CORRECTED RESULT; previously reported as 7.7 on 10/20/2022 at 03:07.    Immature Grans (Abs) 10/20/2022 Test Not Performed  0.00 - 0.04 K/uL Corrected    Comment: Mild elevation in immature granulocytes is non specific and   can be seen in a variety of conditions including stress response,   acute inflammation, trauma and pregnancy. Correlation with other   laboratory and clinical findings is essential.  CORRECTED RESULT; previously reported as 0.10 on 10/20/2022 at 03:07.      Lymph # 10/20/2022 Test Not Performed  1.0 - 4.8 K/uL Corrected    CORRECTED RESULT; previously reported as 2.1 on 10/20/2022 at 03:07.    Mono # 10/20/2022 Test Not Performed  0.3 - 1.0 K/uL Corrected    CORRECTED RESULT; previously reported as 1.1 on 10/20/2022 at 03:07.    Eos # 10/20/2022 Test Not Performed  0.0 - 0.5 K/uL Corrected    CORRECTED RESULT; previously reported as 0.1 on 10/20/2022 at 03:07.    Baso # 10/20/2022 Test Not Performed  0.00 - 0.20 K/uL Corrected    CORRECTED RESULT; previously reported as 0.06 on 10/20/2022 at 03:07.    nRBC 10/20/2022 0  0 /100 WBC Final    Gran % 10/20/2022 66.0  38.0 - 73.0 % Corrected    CORRECTED RESULT; previously reported as 69.0 on 10/20/2022 at 03:07.    Lymph % 10/20/2022 23.0  18.0 - 48.0 % Corrected    CORRECTED RESULT; previously reported as 18.7 on 10/20/2022 at 03:07.    Mono % 10/20/2022 8.0  4.0 - 15.0 % Corrected    CORRECTED RESULT; previously reported as 10.1 on 10/20/2022 at 03:07.    Eosinophil % 10/20/2022 0.0  0.0 - 8.0 % Corrected    CORRECTED RESULT; previously reported as 0.8 on 10/20/2022 at 03:07.    Basophil % 10/20/2022 0.0  0.0 - 1.9 % Corrected    CORRECTED RESULT; previously reported as 0.5 on 10/20/2022 at 03:07.    Bands 10/20/2022 2.0  % Final     Metamyelocytes 10/20/2022 1.0  % Final    Platelet Estimate 10/20/2022 Decreased (A)   Final    Poik 10/20/2022 Slight   Final    Ovalocytes 10/20/2022 Occasional   Final    Tear Drop Cells 10/20/2022 Occasional   Final    Spherocytes 10/20/2022 Occasional   Final    Dohle Bodies 10/20/2022 Present   Final    Toxic Granulation 10/20/2022 Present   Final    Smudge Cells 10/20/2022 Present   Final    Comment: Smudge cells present;Substantial numbers may affect the   accuracy of the differential.      Large/Giant Platelets 10/20/2022 Present   Final    Differential Method 10/20/2022 Manual   Corrected    Comment: CORRECTED RESULT; previously reported as Automated on 10/20/2022 at   03:07.      POC Glucose 10/20/2022 126 (H)  70 - 110 mg/dL Final    POC BUN 10/20/2022 27  6 - 30 mg/dL Final    POC Creatinine 10/20/2022 1.1  0.5 - 1.4 mg/dL Final    POC Sodium 10/20/2022 138  136 - 145 mmol/L Final    POC Potassium 10/20/2022 5.1  3.5 - 5.1 mmol/L Final    POC Chloride 10/20/2022 101  95 - 110 mmol/L Final    POC TCO2 (MEASURED) 10/20/2022 32 (H)  23 - 29 mmol/L Final    POC Ionized Calcium 10/20/2022 1.01 (L)  1.06 - 1.42 mmol/L Final    POC Hematocrit 10/20/2022 56 (H)  36 - 54 %PCV Final    Sample 10/20/2022 CAROL   Final    Influenza A, Molecular 10/20/2022 Negative  Negative Final    Influenza B, Molecular 10/20/2022 Negative  Negative Final    Flu A & B Source 10/20/2022 Nasal swab   Final    SARS-CoV-2 RNA, Amplification, Qual 10/20/2022 Negative  Negative Final    Comment: This test utilizes isothermal nucleic acid amplification technology   to   detect the SARS-CoV-2 RdRp nucleic acid segment. The analytical   sensitivity   (limit of detection) is 500 copies/swab.     A POSITIVE result is indicative of the presence of SARS-CoV-2 RNA;   clinical   correlation with patient history and other diagnostic information is   necessary to determine patient infection status.    A NEGATIVE result means that SARS-CoV-2  nucleic acids are not present   above   the limit of detection. A NEGATIVE result should be treated as   presumptive.   It does not rule out the possibility of COVID-19 and should not be   the sole   basis for treatment decisions. If COVID-19 is strongly suspected   based on   clinical and exposure history, re-testing using an alternate   molecular assay   should be considered.     This test is only for use under the Food and Drug Administration s   Emergency   Use Authorization (EUA).     Commercial kits are provided by [employer]. Performanc                           e   characteristics of the EUA have been independently verified by   [facility] Department of Pathology and Laboratory Medicine.   _________________________________________________________________   The authorized Fact Sheet for Healthcare Providers and the authorized   Fact   Sheet for Patients of the ID NOW COVID-19 are available on the FDA   website:   https://www.fda.gov/media/542492/download   https://www.fda.gov/media/621843/download      Sodium 10/20/2022 137  136 - 145 mmol/L Final    Potassium 10/20/2022 4.5  3.5 - 5.1 mmol/L Final    Chloride 10/20/2022 97  95 - 110 mmol/L Final    CO2 10/20/2022 29  23 - 29 mmol/L Final    Glucose 10/20/2022 126 (H)  70 - 110 mg/dL Final    BUN 10/20/2022 21  8 - 23 mg/dL Final    Creatinine 10/20/2022 1.3  0.5 - 1.4 mg/dL Final    Calcium 10/20/2022 9.6  8.7 - 10.5 mg/dL Final    Total Protein 10/20/2022 7.5  6.0 - 8.4 g/dL Final    Albumin 10/20/2022 4.1  3.5 - 5.2 g/dL Final    Total Bilirubin 10/20/2022 0.9  0.1 - 1.0 mg/dL Final    Comment: For infants and newborns, interpretation of results should be based  on gestational age, weight and in agreement with clinical  observations.    Premature Infant recommended reference ranges:  Up to 24 hours.............<8.0 mg/dL  Up to 48 hours............<12.0 mg/dL  3-5 days..................<15.0 mg/dL  6-29 days.................<15.0  mg/dL      Alkaline Phosphatase 10/20/2022 113  55 - 135 U/L Final    AST 10/20/2022 28  10 - 40 U/L Final    ALT 10/20/2022 47 (H)  10 - 44 U/L Final    Anion Gap 10/20/2022 11  8 - 16 mmol/L Final    eGFR 10/20/2022 59.5 (A)  >60 mL/min/1.73 m^2 Final    Lipase 10/20/2022 6  4 - 60 U/L Final    Troponin I 10/20/2022 0.016  0.000 - 0.026 ng/mL Final    Comment: The reference interval for Troponin I represents the 99th percentile   cutoff   for our facility and is consistent with 3rd generation assay   performance.         Imaging  XR SHOULDER TRAUMA 3 VIEW LEFT    Result Date: 2/6/2023  EXAMINATION: XR SHOULDER TRAUMA 3 VIEW LEFT CLINICAL HISTORY: left shoulder pain from fall; Unspecified fall, initial encounter TECHNIQUE: Three views of the left shoulder were performed. COMPARISON 12/21/2010. FINDINGS: There is demineralization of the osseous structures.  There is no cortical step-off.  There is no evidence of periostitis. The glenohumeral articulation is maintained.  The acromioclavicular joint is maintained.  The coracoclavicular interval is unremarkable. The visualized left hemithorax is within normal limits.  There is no evidence of a pneumothorax.  No pulmonary contusion is identified.     No evidence of a fracture or dislocation of the left shoulder. Electronically signed by: Edmund Mejias MD Date:    02/06/2023 Time:    15:16    XR SHOULDER TRAUMA 3 VIEW RIGHT    Result Date: 2/6/2023  EXAMINATION: XR SHOULDER TRAUMA 3 VIEW RIGHT CLINICAL HISTORY: right shoulder pain from fall;Unspecified fall, initial encounter TECHNIQUE: Three or four views of the right shoulder were performed. COMPARISON: 12/21/2010. FINDINGS: The bone mineralization is within normal limits.  There is no cortical step-off.  There is no evidence of periostitis. The glenohumeral articulation is maintained.  The acromioclavicular joint is within normal limits.  The coracoclavicular interval is unremarkable. There is unchanged sclerosis  involving the right lateral hemithorax along with postoperative changes.  No airspace opacity identified.  There is no evidence of a pneumothorax.     No evidence of acute fracture or dislocation of the right shoulder. Electronically signed by: Edmund Mejias MD Date:    02/06/2023 Time:    15:19      Assessment  1. Atherosclerosis of native coronary artery of native heart without angina pectoris  Stable on medical therapy  - Lipid Panel; Future  - Comprehensive Metabolic Panel; Future    2. Chronic diastolic heart failure  Compensated.  Rarely needs Lasix  - CBC Auto Differential; Future  - Magnesium; Future    3. Atherosclerotic peripheral vascular disease  Asymptomatic    4. Primary hypertension  Controlled    5. Severe obesity  Will see if he keeps weight off that he lost with his critical illness    6. Supraventricular tachycardia  Controlled on medications      Plan and Discussion    Continue current guideline directed medical therapy.    The 10-year ASCVD risk score (Rosalie DK, et al., 2019) is: 29.6%    Values used to calculate the score:      Age: 67 years      Sex: Male      Is Non- : No      Diabetic: Yes      Tobacco smoker: Yes      Systolic Blood Pressure: 110 mmHg      Is BP treated: Yes      HDL Cholesterol: 58 mg/dL      Total Cholesterol: 178 mg/dL     Follow Up  Follow up in about 3 months (around 6/1/2023).      Shemar Dempsey MD
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
## 2023-03-15 ENCOUNTER — CARE AT HOME (OUTPATIENT)
Dept: HOME HEALTH SERVICES | Facility: CLINIC | Age: 68
End: 2023-03-15
Payer: MEDICARE

## 2023-03-15 DIAGNOSIS — S31.30XD OPEN WOUND OF SCROTUM AND TESTES, SUBSEQUENT ENCOUNTER: Primary | ICD-10-CM

## 2023-03-15 PROCEDURE — 99442 PR PHYSICIAN TELEPHONE EVALUATION 11-20 MIN: ICD-10-PCS | Mod: 95,,, | Performed by: NURSE PRACTITIONER

## 2023-03-15 PROCEDURE — 99442 PR PHYSICIAN TELEPHONE EVALUATION 11-20 MIN: CPT | Mod: 95,,, | Performed by: NURSE PRACTITIONER

## 2023-03-16 NOTE — PATIENT INSTRUCTIONS
Instructions:  - [facility] Nurse Practitioner to schedule home follow-up visit with patient as needed.  - Continue all medications, treatments and therapies as ordered.   - Follow all instructions, recommendations as discussed.  - Maintain Safety Precautions at all times.  - Attend all medical appointments as scheduled.  - For worsening symptoms: call Primary Care Physician or Nurse Practitioner.  - For emergencies, call 911 or immediately report to the nearest emergency room.

## 2023-03-16 NOTE — PROGRESS NOTES
[facility]  Medical Home Visit    Established Patient - Audio Only Telehealth Visit     The patient location is: his residence, [place], [place]  The chief complaint leading to consultation is: follow up from prior hospitalization  Visit type: Virtual visit with audio only (telephone)  Total time spent with patient: 20 min     The reason for the audio only service rather than synchronous audio and video virtual visit was related to technical difficulties or patient preference/necessity.     Each patient to whom I provide medical services by telemedicine is:  (1) informed of the relationship between the physician and patient and the respective role of any other health care provider with respect to management of the patient; and (2) notified that they may decline to receive medical services by telemedicine and may withdraw from such care at any time. Patient verbally consented to receive this service via voice-only telephone call.     This service was not originating from a related E/M service provided within the previous 7 days nor will  to an E/M service or procedure within the next 24 hours or my soonest available appointment.  Prevailing standard of care was able to be met in this audio-only visit.      Visit Date: 3/15/23  Encounter Provider: Giovanni Adkins  PCP:  Tushar Drew MD    Subjective:    Patient ID: Nithin Wagner is a 67 y.o. male.  Consult Requested By:  No ref. provider found  Reason for Consult:    Nithin is being seen at home due to being seen at home due to physical debility that presents a taxing effort to leave the home, to mitigate high risk of hospital readmission and/or due to the limited availability of reliable or safe options for transportation to the point of access to the provider. Prior to treatment on this visit the chart was reviewed and patient verbal consent was obtained.    Chief Complaint: Follow-up  Pt being seen today for f/u from [place] 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      TODAY: Audio visit today, pt states "he is on the mend", doing much better. He is still living alone in his upstairs apartment, with nephew or friends assisting when needed. He is ambulatory without assistance now, denies any recent falls. Pt is now driving himself to all his appt's. Pt was recently given a fall alert necklace from [employer] he is wearing daily.   Pt states appetite is improving, he is having meals brought to him by his nephew and neighbors.   Pt denies any bowel/bladder problems, taking his Linzess to help regulate his bowel movements.   Pt is off his oxycodone 10, does occasionally take a hydrocodone prn if needed.    Pt currently on 10mg of Prednisone qd per Rheumatology.     Pt has [facility]/PT/OT working with him.     VSS. Denies fever, chest pain, shortness of breath, nausea, vomiting, diarrhea.  No other needs identified at this time.      Review of Systems   Constitutional:  Negative for activity change, appetite change, chills and fever.   Respiratory:  Negative for cough, chest tightness and shortness of breath.    Cardiovascular:  Negative for chest pain, palpitations and leg swelling.   Gastrointestinal:  Negative for abdominal pain, constipation, diarrhea, nausea and vomiting.   Genitourinary:  Negative for decreased urine volume, difficulty urinating, dysuria, frequency and scrotal swelling.   Musculoskeletal:  Positive for arthralgias. Negative for gait problem.   Skin:  Negative for color change, rash and wound.   Neurological:  Negative for dizziness, syncope, weakness and headaches.   Hematological:  Does not bruise/bleed easily.   Psychiatric/Behavioral:  Negative for confusion and sleep disturbance. The patient is not nervous/anxious.      Assessments:  Environmental: dirty, cluttered  Functional Status: mos assist  Safety: high fall risk  Nutritional: adequate, appetite " poor  Home Health/DME/Supplies: Rolator, cane, wound care supplies      1. 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  -Pt states he has small new abscess to his R groin area, taking Bactrim, has seen his physician for and monitoring.    -Using barrier cream to groins to reduce moisture and redness      4. Trauma from fall at [facility]  Assessment & Plan:  -Pt still with L shoulder pain from fall while picking up his medications at [facility] pharmacy.   -Pt working with PT    Were controlled substances prescribed?  No    Home NP will f/u with pt prn. Information given to pt if questions, concerns or status changes to contact me.     Follow Up Appointments:   Future Appointments   Date Time Provider Department Center   3/22/2023  8:30 AM [facility]   3/22/2023  1:30 PM [facility]   3/24/2023  2:30 PM Marisela Kee RD[facility]   4/5/2023  2:30 PM [facility]   4/18/2023  9:15 AM LAB, [facility]   4/18/2023  9:30 AM LAB, [facility]   4/26/2023  1:00 PM Kevin Lares MD [facility] [id number] [facility]   5/2/2023  1:30 PM INFUSION, CHAIR 8 [facility]   5/16/2023  1:30 PM INFUSION, CHAIR 8 [facility]   5/30/2023  1:30 PM INFUSION, CHAIR 8 [facility]   6/7/2023  1:40 PM Shemar Dempsey MD [facility] [id number] [facility]       Signature: Giovanni Adkins NP

## 2023-03-21 PROCEDURE — G0179 MD RECERTIFICATION HHA PT: HCPCS | Mod: ,,, | Performed by: UROLOGY

## 2023-03-21 PROCEDURE — G0179 PR HOME HEALTH MD RECERTIFICATION: ICD-10-PCS | Mod: ,,, | Performed by: UROLOGY

## 2023-03-22 ENCOUNTER — PATIENT MESSAGE (OUTPATIENT)
Dept: DIABETES | Facility: CLINIC | Age: 68
End: 2023-03-22

## 2023-03-22 ENCOUNTER — CLINICAL SUPPORT (OUTPATIENT)
Dept: DIABETES | Facility: CLINIC | Age: 68
End: 2023-03-22
Payer: MEDICARE

## 2023-03-22 VITALS — BODY MASS INDEX: 29.8 KG/M2 | WEIGHT: 208.13 LBS | HEIGHT: 70 IN

## 2023-03-22 DIAGNOSIS — Z79.4 TYPE 2 DIABETES MELLITUS WITH HYPERGLYCEMIA, WITH LONG-TERM CURRENT USE OF INSULIN: Primary | ICD-10-CM

## 2023-03-22 DIAGNOSIS — E11.65 TYPE 2 DIABETES MELLITUS WITH HYPERGLYCEMIA, WITH LONG-TERM CURRENT USE OF INSULIN: Primary | ICD-10-CM

## 2023-03-22 PROCEDURE — G0108 DIAB MANAGE TRN  PER INDIV: HCPCS | Mod: S$GLB,,, | Performed by: FAMILY MEDICINE

## 2023-03-22 PROCEDURE — 99999 PR PBB SHADOW E&M-EST. PATIENT-LVL III: ICD-10-PCS | Mod: PBBFAC,,,

## 2023-03-22 PROCEDURE — 99999 PR PBB SHADOW E&M-EST. PATIENT-LVL III: CPT | Mod: PBBFAC,,,

## 2023-03-22 PROCEDURE — G0108 PR DIAB MANAGE TRN  PER INDIV: ICD-10-PCS | Mod: S$GLB,,, | Performed by: FAMILY MEDICINE

## 2023-03-23 ENCOUNTER — LAB VISIT (OUTPATIENT)
Dept: LAB | Facility: OTHER | Age: 68
End: 2023-03-23
Attending: INTERNAL MEDICINE
Payer: MEDICARE

## 2023-03-23 ENCOUNTER — TELEPHONE (OUTPATIENT)
Dept: DIABETES | Facility: CLINIC | Age: 68
End: 2023-03-23
Payer: MEDICARE

## 2023-03-23 ENCOUNTER — PATIENT MESSAGE (OUTPATIENT)
Dept: DIABETES | Facility: CLINIC | Age: 68
End: 2023-03-23
Payer: MEDICARE

## 2023-03-23 DIAGNOSIS — Z79.4 TYPE 2 DIABETES MELLITUS WITH HYPERGLYCEMIA, WITH LONG-TERM CURRENT USE OF INSULIN: ICD-10-CM

## 2023-03-23 DIAGNOSIS — I50.32 CHRONIC DIASTOLIC HEART FAILURE: ICD-10-CM

## 2023-03-23 DIAGNOSIS — E11.65 TYPE 2 DIABETES MELLITUS WITH HYPERGLYCEMIA, WITH LONG-TERM CURRENT USE OF INSULIN: Primary | ICD-10-CM

## 2023-03-23 DIAGNOSIS — Z79.4 TYPE 2 DIABETES MELLITUS WITH HYPERGLYCEMIA, WITH LONG-TERM CURRENT USE OF INSULIN: Primary | ICD-10-CM

## 2023-03-23 DIAGNOSIS — E11.65 TYPE 2 DIABETES MELLITUS WITH HYPERGLYCEMIA, WITH LONG-TERM CURRENT USE OF INSULIN: ICD-10-CM

## 2023-03-23 DIAGNOSIS — I25.10 ATHEROSCLEROSIS OF NATIVE CORONARY ARTERY OF NATIVE HEART WITHOUT ANGINA PECTORIS: ICD-10-CM

## 2023-03-23 LAB
ALBUMIN SERPL BCP-MCNC: 3.8 G/DL (ref 3.5–5.2)
ALP SERPL-CCNC: 97 U/L (ref 55–135)
ALT SERPL W/O P-5'-P-CCNC: 23 U/L (ref 10–44)
ANION GAP SERPL CALC-SCNC: 8 MMOL/L (ref 8–16)
AST SERPL-CCNC: 15 U/L (ref 10–40)
BASOPHILS # BLD AUTO: 0.06 K/UL (ref 0–0.2)
BASOPHILS NFR BLD: 0.6 % (ref 0–1.9)
BILIRUB SERPL-MCNC: 0.4 MG/DL (ref 0.1–1)
BUN SERPL-MCNC: 19 MG/DL (ref 8–23)
CALCIUM SERPL-MCNC: 9.4 MG/DL (ref 8.7–10.5)
CHLORIDE SERPL-SCNC: 101 MMOL/L (ref 95–110)
CHOLEST SERPL-MCNC: 183 MG/DL (ref 120–199)
CHOLEST/HDLC SERPL: 3.9 {RATIO} (ref 2–5)
CO2 SERPL-SCNC: 28 MMOL/L (ref 23–29)
CREAT SERPL-MCNC: 1 MG/DL (ref 0.5–1.4)
DIFFERENTIAL METHOD: ABNORMAL
EOSINOPHIL # BLD AUTO: 0.2 K/UL (ref 0–0.5)
EOSINOPHIL NFR BLD: 2.1 % (ref 0–8)
ERYTHROCYTE [DISTWIDTH] IN BLOOD BY AUTOMATED COUNT: 15.2 % (ref 11.5–14.5)
EST. GFR  (NO RACE VARIABLE): >60 ML/MIN/1.73 M^2
ESTIMATED AVG GLUCOSE: 169 MG/DL (ref 68–131)
GLUCOSE SERPL-MCNC: 199 MG/DL (ref 70–110)
HBA1C MFR BLD: 7.5 % (ref 4–5.6)
HCT VFR BLD AUTO: 50.2 % (ref 40–54)
HDLC SERPL-MCNC: 47 MG/DL (ref 40–75)
HDLC SERPL: 25.7 % (ref 20–50)
HGB BLD-MCNC: 16.4 G/DL (ref 14–18)
IMM GRANULOCYTES # BLD AUTO: 0.08 K/UL (ref 0–0.04)
IMM GRANULOCYTES NFR BLD AUTO: 0.9 % (ref 0–0.5)
LDLC SERPL CALC-MCNC: 108.8 MG/DL (ref 63–159)
LYMPHOCYTES # BLD AUTO: 2.6 K/UL (ref 1–4.8)
LYMPHOCYTES NFR BLD: 27.6 % (ref 18–48)
MAGNESIUM SERPL-MCNC: 2.2 MG/DL (ref 1.6–2.6)
MCH RBC QN AUTO: 31.7 PG (ref 27–31)
MCHC RBC AUTO-ENTMCNC: 32.7 G/DL (ref 32–36)
MCV RBC AUTO: 97 FL (ref 82–98)
MONOCYTES # BLD AUTO: 0.7 K/UL (ref 0.3–1)
MONOCYTES NFR BLD: 7.9 % (ref 4–15)
NEUTROPHILS # BLD AUTO: 5.7 K/UL (ref 1.8–7.7)
NEUTROPHILS NFR BLD: 60.9 % (ref 38–73)
NONHDLC SERPL-MCNC: 136 MG/DL
NRBC BLD-RTO: 0 /100 WBC
PLATELET # BLD AUTO: 232 K/UL (ref 150–450)
PMV BLD AUTO: 11.6 FL (ref 9.2–12.9)
POTASSIUM SERPL-SCNC: 4.3 MMOL/L (ref 3.5–5.1)
PROT SERPL-MCNC: 7.2 G/DL (ref 6–8.4)
RBC # BLD AUTO: 5.17 M/UL (ref 4.6–6.2)
SODIUM SERPL-SCNC: 137 MMOL/L (ref 136–145)
TRIGL SERPL-MCNC: 136 MG/DL (ref 30–150)
WBC # BLD AUTO: 9.32 K/UL (ref 3.9–12.7)

## 2023-03-23 PROCEDURE — 83735 ASSAY OF MAGNESIUM: CPT | Performed by: INTERNAL MEDICINE

## 2023-03-23 PROCEDURE — 83036 HEMOGLOBIN GLYCOSYLATED A1C: CPT | Performed by: INTERNAL MEDICINE

## 2023-03-23 PROCEDURE — 80061 LIPID PANEL: CPT | Performed by: INTERNAL MEDICINE

## 2023-03-23 PROCEDURE — 80053 COMPREHEN METABOLIC PANEL: CPT | Performed by: INTERNAL MEDICINE

## 2023-03-23 PROCEDURE — 85025 COMPLETE CBC W/AUTO DIFF WBC: CPT | Performed by: INTERNAL MEDICINE

## 2023-03-23 PROCEDURE — 36415 COLL VENOUS BLD VENIPUNCTURE: CPT | Performed by: INTERNAL MEDICINE

## 2023-03-23 RX ORDER — INSULIN PMP CART,AUT,G6/7,CNTR
1 EACH SUBCUTANEOUS DAILY
Qty: 1 EACH | Refills: 0 | Status: SHIPPED | OUTPATIENT
Start: 2023-03-23 | End: 2023-06-19

## 2023-03-23 RX ORDER — INSULIN ASPART 100 [IU]/ML
INJECTION, SOLUTION INTRAVENOUS; SUBCUTANEOUS
Qty: 50 ML | Refills: 11 | Status: SHIPPED | OUTPATIENT
Start: 2023-03-23 | End: 2023-04-26 | Stop reason: SDUPTHER

## 2023-03-23 RX ORDER — INSULIN PMP CART,AUT,G6/7,CNTR
1 EACH SUBCUTANEOUS DAILY
Qty: 30 EACH | Refills: 11 | Status: SHIPPED | OUTPATIENT
Start: 2023-03-23 | End: 2023-04-27 | Stop reason: SDUPTHER
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
## 2023-03-24 RX ORDER — TIRZEPATIDE 2.5 MG/.5ML
2.5 INJECTION, SOLUTION SUBCUTANEOUS
Qty: 4 PEN | Refills: 5 | Status: SHIPPED | OUTPATIENT
Start: 2023-03-24 | End: 2023-06-19

## 2023-03-24 NOTE — TELEPHONE ENCOUNTER
$150 per 15 pods.    44 tresiba    14 novolog TIDWM    Around 100 units/day total    Initial pump settings recommendations:    Current doses:  44 units basal  42 units prandial during the day.  Total daily dose (TDD): 86    Basal: 40-50% of TDD = 34.4-43 units per day of basal -> 1.43-1.85 units/hour  Carb ratio: 500/TDD = 5.8  Sensitivity factor: 1700/TDD -> 19.7    So, recommend using these settings to start:    Basal rate: 1.6 units/hour  Carb ratio: 1 unit of insulin per 6 grams of carbs  SF: 25 mg/dL change in glucose per unit of insulin    Encourage pt to closely monitor sugar, check in with me and/or diabetes education with log/CGM data within 1-2 weeks for further adjustments.

## 2023-03-24 NOTE — TELEPHONE ENCOUNTER
Got alert omnipod very expensive, especially using 1 pod/day.    Try for GLP1 to see if that can help decrease insulin dose requirements.    Last seen 3/22/2023    Lab Results   Component Value Date    HGBA1C 7.5 (H) 03/23/2023     Mounjaro 2.5 mg/week  After 4 weeks can try for 5 mg

## 2023-03-31 ENCOUNTER — OFFICE VISIT (OUTPATIENT)
Dept: URGENT CARE | Facility: CLINIC | Age: 68
End: 2023-03-31
Payer: MEDICARE

## 2023-03-31 VITALS
HEIGHT: 70 IN | WEIGHT: 208 LBS | TEMPERATURE: 99 F | HEART RATE: 68 BPM | SYSTOLIC BLOOD PRESSURE: 148 MMHG | RESPIRATION RATE: 16 BRPM | DIASTOLIC BLOOD PRESSURE: 89 MMHG | OXYGEN SATURATION: 98 % | BODY MASS INDEX: 29.78 KG/M2

## 2023-03-31 DIAGNOSIS — Z51.89 ENCOUNTER FOR WOUND CARE: Primary | ICD-10-CM

## 2023-03-31 DIAGNOSIS — S59.912A INJURY OF LEFT FOREARM, INITIAL ENCOUNTER: ICD-10-CM

## 2023-03-31 DIAGNOSIS — I10 PRIMARY HYPERTENSION: ICD-10-CM

## 2023-03-31 DIAGNOSIS — T14.8XXA ABRASION: ICD-10-CM

## 2023-03-31 PROCEDURE — 99214 OFFICE O/P EST MOD 30 MIN: CPT | Mod: S$GLB,,, | Performed by: FAMILY MEDICINE

## 2023-03-31 PROCEDURE — 99214 PR OFFICE/OUTPT VISIT, EST, LEVL IV, 30-39 MIN: ICD-10-PCS | Mod: S$GLB,,, | Performed by: FAMILY MEDICINE

## 2023-03-31 RX ORDER — MUPIROCIN 20 MG/G
OINTMENT TOPICAL
Qty: 22 G | Refills: 1 | Status: SHIPPED | OUTPATIENT
Start: 2023-03-31 | End: 2023-03-31

## 2023-03-31 RX ORDER — MUPIROCIN 20 MG/G
OINTMENT TOPICAL
Qty: 22 G | Refills: 1 | Status: SHIPPED | OUTPATIENT
Start: 2023-03-31

## 2023-03-31 NOTE — PROGRESS NOTES
"Subjective:      Patient ID: Nithin Wagner is a 67 y.o. male.    Vitals:  height is 5' 10" (1.778 m) and weight is 94.3 kg (208 lb). His temperature is 98.5 °F (36.9 °C). His blood pressure is 148/89 (abnormal) and his pulse is 68. His respiration is 16 and oxygen saturation is 98%.     Chief Complaint: Laceration    Patient reports that the wind caught a door and hit his left forearm. Denies fall or head injury. Denies LOC HA, dizziness, weakness. BP is found to be mildly elevated. Pt is compliant with his BP meds. Denies CP, SOB.     Laceration   The incident occurred 1 to 3 hours ago. The laceration is located on the Left arm. Injury mechanism: door. The pain is at a severity of 2/10. The pain is mild. It is unknown if a foreign body is present. His tetanus status is UTD.     Skin:  Positive for laceration.    Objective:     Physical Exam   Constitutional: He is oriented to person, place, and time. He appears well-developed. He is cooperative.  Non-toxic appearance. He does not appear ill. No distress.   HENT:   Head: Normocephalic and atraumatic. Head is without abrasion, without contusion and without laceration.   Ears:   Right Ear: Hearing, tympanic membrane and external ear normal. No hemotympanum.   Left Ear: Hearing and external ear normal. No hemotympanum.   Nose: Nose normal. No mucosal edema, rhinorrhea or nasal deformity. No epistaxis. Right sinus exhibits no maxillary sinus tenderness and no frontal sinus tenderness. Left sinus exhibits no maxillary sinus tenderness and no frontal sinus tenderness.   Mouth/Throat: Uvula is midline, oropharynx is clear and moist and mucous membranes are normal. No trismus in the jaw. Normal dentition. No uvula swelling. No posterior oropharyngeal erythema.   Eyes: Conjunctivae, EOM and lids are normal. Pupils are equal, round, and reactive to light. Right eye exhibits no discharge. Left eye exhibits no discharge. No scleral icterus.   Neck: Trachea normal and phonation " normal. Neck supple. No tracheal deviation present. No neck rigidity present. No spinous process tenderness present. No muscular tenderness present.   Cardiovascular: Normal rate, regular rhythm, normal heart sounds and normal pulses.   No murmur heard.  Pulmonary/Chest: Effort normal and breath sounds normal. No stridor. No respiratory distress. He has no wheezes. He has no rales.   Abdominal: Normal appearance and bowel sounds are normal. He exhibits no distension and no mass. Soft. There is no abdominal tenderness.   Musculoskeletal: Normal range of motion.         General: No deformity. Normal range of motion.   Neurological: He is alert and oriented to person, place, and time. He has normal strength. No cranial nerve deficit or sensory deficit. He exhibits normal muscle tone. He displays no seizure activity. Coordination normal. GCS eye subscore is 4. GCS verbal subscore is 5. GCS motor subscore is 6.   Skin: Skin is warm, dry, intact, not diaphoretic and not pale. Capillary refill takes less than 2 seconds. No abrasion, No burn, No bruising and No ecchymosis         Comments:   Left Forearm:  +ve avulsion of epidermis of about 4cm area to lateral aspect of forearm.  FROM at elbow and wrist.   Neurovascular intact.     Wound cleaned with saline and betadine.  Dressing placed using oil emulsion non-stick gauze.   Psychiatric: His speech is normal and behavior is normal. Judgment and thought content normal.   Nursing note and vitals reviewed.    Assessment:     1. Encounter for wound care    2. Injury of left forearm, initial encounter    3. Abrasion    4. Primary hypertension        Plan:   Wound cleaned and dressed. Wound precautions advised. Educated to do daily dressing. Discussed diagnosis/plan with patient in clinic. HTN precautions advised. Advised to f/u with PCP within 2-5 days. ER precautions given if symptoms get any worse. All questions answered. Patient verbally understood and agreed with treatment  plan.     Encounter for wound care    Injury of left forearm, initial encounter    Abrasion    Primary hypertension    Other orders  -     Discontinue: mupirocin (BACTROBAN) 2 % ointment; Apply to affected area 3 times daily  Dispense: 22 g; Refill: 1  -     mupirocin (BACTROBAN) 2 % ointment; Apply to affected area 3 times daily  Dispense: 22 g; Refill: 1

## 2023-04-02 ENCOUNTER — DOCUMENT SCAN (OUTPATIENT)
Dept: HOME HEALTH SERVICES | Facility: HOSPITAL | Age: 68
End: 2023-04-02
Payer: MEDICARE

## 2023-04-05 ENCOUNTER — CLINICAL SUPPORT (OUTPATIENT)
Dept: DIABETES | Facility: CLINIC | Age: 68
End: 2023-04-05
Payer: MEDICARE

## 2023-04-05 ENCOUNTER — PATIENT MESSAGE (OUTPATIENT)
Dept: DIABETES | Facility: CLINIC | Age: 68
End: 2023-04-05

## 2023-04-05 DIAGNOSIS — E11.65 TYPE 2 DIABETES MELLITUS WITH HYPERGLYCEMIA, WITH LONG-TERM CURRENT USE OF INSULIN: Primary | ICD-10-CM

## 2023-04-05 DIAGNOSIS — Z79.4 TYPE 2 DIABETES MELLITUS WITH HYPERGLYCEMIA, WITH LONG-TERM CURRENT USE OF INSULIN: Primary | ICD-10-CM

## 2023-04-05 PROCEDURE — 99999 PR PBB SHADOW E&M-EST. PATIENT-LVL III: CPT | Mod: PBBFAC,,,

## 2023-04-05 PROCEDURE — 99999 PR PBB SHADOW E&M-EST. PATIENT-LVL III: ICD-10-PCS | Mod: PBBFAC,,,

## 2023-04-05 PROCEDURE — G0108 DIAB MANAGE TRN  PER INDIV: HCPCS | Mod: S$GLB,,, | Performed by: FAMILY MEDICINE

## 2023-04-05 PROCEDURE — G0108 PR DIAB MANAGE TRN  PER INDIV: ICD-10-PCS | Mod: S$GLB,,, | Performed by: FAMILY MEDICINE

## 2023-04-05 NOTE — PROGRESS NOTES
Diabetes Care Specialist Progress Note  Author: Lalitha Stroud RN, CDE  Date: 4/5/2023    Program Intake  Reason for Diabetes Program Visit:: Intervention  Type of Intervention:: Individual  Individual: Education  Education: Self-Management Skill Review, Nutrition and Meal Planning, Pattern Management  Current diabetes risk level:: moderate    Lab Results   Component Value Date    HGBA1C 7.5 (H) 03/23/2023     Diabetes Self-Management Skills Assessment    Diabetes Disease Process/Treatment Options  Diabetes Disease Process/Treatment Options: Skills Assessment Completed: No  Deferred due to:: Time    Nutrition/Healthy Eating  Nutrition/Healthy Eating Skills Assessment Completed:: No  Deffered due to:: Time    Physical Activity/Exercise  Physical Activity/Exercise Skills Assessment Completed: : No  Deffered due to:: Time    Medications  Patient is able to describe current diabetes management routine.: yes  Diabetes management routine:: (S) diet, insulin, insulin pump (here for Omnipod 5 training)  Patient is able to identify current diabetes medications, dosages, and appropriate timing of medications.: yes  Patient understands the purpose of the medications taken for diabetes.: yes  Patient reports problems or concerns with current medication regimen.: yes  Medication regimen problems/concerns:: lack of training  Medication Skills Assessment Completed:: Yes  Assessment indicates:: Knowledge deficit, Instruction Needed  Area of need?: Yes    Home Blood Glucose Monitoring  Patient states that blood sugar is checked at home daily.: yes  Monitoring Method:: personal continuous glucose monitor  Personal CGM type:: Dexcom G6  Patient is able to use personal CGM appropriately.: yes  CGM Report reviewed?: yes  Home Blood Glucose Monitoring Skills Assessment Completed: : Yes  Assessment indicates:: Adequate understanding  Area of need?: No    Acute Complications  Acute Complications Skills Assessment Completed: : No  Deffered due  to:: Time    Chronic Complications  Chronic Complications Skills Assessment Completed: : No  Deferred due to:: Time    Psychosocial/Coping  Psychosocial/Coping Skills Assessment Completed: : No  Deffered due to:: Time    Diabetes Self Support Plan     Assessment Summary and Plan    Based on today's diabetes care assessment, the following areas of need were identified:      Social 4/26/2022   Access to [employer]/Tech No   Cognitive/Behavioral Health No   Culture/Shinto No   Communication No   Health Literacy No      Clinical 3/22/2023   Nutritional Status No      Diabetes Self-Management Skills 4/5/2023   Diabetes Disease Process/Treatment Options -   Nutrition/Healthy Eating -   Physical Activity/Exercise -   Medication Yes: see care planning   Home Blood Glucose Monitoring No   Acute Complications -   Chronic Complications -   Psychosocial/Coping -      Today's interventions were provided through individual discussion, instruction, and written materials were provided.      Patient verbalized understanding of instruction and written materials.  Pt was able to return back demonstration of instructions today. Patient understood key points, needs reinforcement and further instruction.     Diabetes Self-Management Care Plan:    Today's Diabetes Self-Management Care Plan was developed with Nithin's input. Nithin has agreed to work toward the following goal(s) to improve his/her overall diabetes control.      Care Plan: Diabetes Management   Updates made since 3/6/2023 12:00 AM        Problem: Medications         Goal: Pt will discuss w/ endo about pump options d/t high insulin needs.    Start Date: 9/20/2022   Expected End Date: 10/22/2022   This Visit's Progress: Met   Recent Progress: On track   Priority: High   Barriers: No Barriers Identified   Note:    11/1/22 - Pt came for pump eval appt today. Pt has already been counting carbohydrates for inPen use. He is very knowledgeable of looking up CHO on databases/apps  and prefers to get a very accurate CHO count. Discussed estimating CHO using 15g servings for foods that do not have a label. Pt had questions about glycemic index and using it to adjust CHO grams. Explained looking at dietary fiber and subtracting from total CHO if >5g d/t slowed absorption of CHO with high fiber foods. Pt verbalized he is most interested in OmniPod 5. Provided education on insulin pump delivery and basic overview of OmniPod 5 system.    3/1/2023 - Pt now home after long [facility] stay. Insulin regime significantly different and needing adjustments d/t weight loss. Not taking any tresiba d/t low BG issues. Using 15-20u of novolog 3-4x/day before meals based on inPen w/ carb ratio/correction. Restarted Trulicity 4.5mg byt having significant nausea, vomiting and constipation since off of it for >1 month recently. Pt feels ready to start Omnipod 5 since insulin needs so reduced since significant weight loss.        Goal: Pt will use Omnipod 5 system to deliver insulin continuously.    Start Date: 4/5/2023   Expected End Date: 5/5/2023   Priority: High   Barriers: No Barriers Identified   Note:    OMNI POD 5 INSULIN PUMP START    Pump training was provided per Omni Pod protocol. First time using pump therapy. Settings per Dr. Kevin Lares.    Basal:  12AM: 1.6 units/hr     Max basal rate: 3.5 u/hr     Bolus:  CHO ratio: 1:6  ISF: 1:25  Glucose target : 150 mg/dL  Correct  over: 150 mg/dl  Active insulin time: 4 hours  Max bolus: 25 units     Low reservoir insulin alarm: 10 units  Change pod alarm: every 3 days       Details of pump therapy were covered included following controller features and programming, pod activation, pod site selection and rotation, automatic pod priming and insertion, setting & editing basal rates in manual mode, giving bolus and other features in the set-up menu.  The following regarding the Omnipod 5 was covered:  During your first Pod wear, since no recent history is available, the  Omnipod 5 System uses only your active Basal Program from Manual Mode as a starting point to adjust your insulin. After 48 hours of history is collected, which usually happens with the first Pod wear, [employer] technology stops adjusting insulin against your active Basal Program and starts using the Adaptive Basal Rate for your automated insulin delivery with your next Pod change. With each Pod change, insulin delivery information is sent and saved in the Omnipod 5 Ashia so that the next Pod that is started is updated with the new Adaptive Basal Rate. With each new Pod activation, the system adapts insulin delivery based on physiological needs and total daily insulin (TDI) delivered. After 2-3 Pod changes, adaptation generally stabilizes and automated insulin delivery is based on this adaptation.  Patient demonstrated ability to program controller, activate and insert pod using aseptic technique.  Patient demonstrated ability to program Dexcom transmitter into controller and start automated limited mode.    Instructed pt on use of basic pump features ie...give a bolus, pause insulin, switch from manual to automated mode.  Reviewed features available in manual mode verses automated mode.   Reviewed when and how to use activity function in automated mode.  Reviewed site selection of pods, rotation of sites and hard stop to change pod every 72 hrs.   Instructed that insulin vial is good out of refrigeration for up to 28-30 days.   Reviewed treatment of hypoglycemia, hyperglycemia; sick day care, DKA, and troubleshooting of pump.  Omni Pod 24-hour support line provided.       Ac username: [email or web address]  Ac password: [email or web address]     Brianda username: [email or web address]  Brianda password: [email or web address]            Task: Reviewed with patient all current diabetes medications and provided basic review of the purpose, dosage, frequency, side effects, and storage of both oral and injectable diabetes medications.  Completed 4/5/2023        Task: Discussed guidelines for preventing, detecting and treating hypoglycemia and hyperglycemia and reviewed the importance of meal and medication timing with diabetes mediations for prevention of hypoglycemia and maximum drug benefit. Completed 4/5/2023        Goal: Pt will being Mounjaro 2.5 mg weekly injectable.    Start Date: 4/5/2023   Expected End Date: 5/5/2023   Priority: High   Barriers: No Barriers Identified   Note:    4/5/23 - Pt has Mounjaro ordered to help reduce TDD of insulin, to reduce frequency of pod changes. Daily pod changes somewhat expensive. Pt hoping that pump therapy will help reduce his TDD needs.        Task: Reviewed with patient all current diabetes medications and provided basic review of the purpose, dosage, frequency, side effects, and storage of both oral and injectable diabetes medications. Completed 4/5/2023        Task: Reviewed possible resources for acquiring cost prohibitive medication. Completed 4/5/2023        Task: Instructed patient on how to self-administer Mounjaro. Completed 4/5/2023        Task: Discussed guidelines for preventing, detecting and treating hypoglycemia and hyperglycemia and reviewed the importance of meal and medication timing with diabetes mediations for prevention of hypoglycemia and maximum drug benefit. Completed 4/5/2023        Follow Up Plan     Follow up in about 1 week (around 4/12/2023) for first pump upload and review. .    Today's care plan and follow up schedule was discussed with patient.  Nithin verbalized understanding of the care plan, goals, and agrees to follow up plan.        The patient was encouraged to communicate with his/her health care provider/physician and care team regarding his/her condition(s) and treatment.  I provided the patient with my contact information today and encouraged to contact me via phone or [facility] Patient Portal as needed.     Length of Visit   Total Time: 60 Minutes

## 2023-04-06 ENCOUNTER — PATIENT MESSAGE (OUTPATIENT)
Dept: DIABETES | Facility: CLINIC | Age: 68
End: 2023-04-06
Payer: MEDICARE

## 2023-04-11 ENCOUNTER — PATIENT MESSAGE (OUTPATIENT)
Dept: DIABETES | Facility: CLINIC | Age: 68
End: 2023-04-11
Payer: MEDICARE

## 2023-04-12 ENCOUNTER — EXTERNAL HOME HEALTH (OUTPATIENT)
Dept: HOME HEALTH SERVICES | Facility: HOSPITAL | Age: 68
End: 2023-04-12
Payer: MEDICARE

## 2023-04-12 ENCOUNTER — CLINICAL SUPPORT (OUTPATIENT)
Dept: DIABETES | Facility: CLINIC | Age: 68
End: 2023-04-12
Payer: MEDICARE

## 2023-04-12 DIAGNOSIS — E11.65 TYPE 2 DIABETES MELLITUS WITH HYPERGLYCEMIA, WITH LONG-TERM CURRENT USE OF INSULIN: Primary | ICD-10-CM

## 2023-04-12 DIAGNOSIS — Z79.4 TYPE 2 DIABETES MELLITUS WITH HYPERGLYCEMIA, WITH LONG-TERM CURRENT USE OF INSULIN: Primary | ICD-10-CM

## 2023-04-12 PROCEDURE — 99999 PR PBB SHADOW E&M-EST. PATIENT-LVL III: ICD-10-PCS | Mod: PBBFAC,,,

## 2023-04-12 PROCEDURE — G0108 PR DIAB MANAGE TRN  PER INDIV: ICD-10-PCS | Mod: S$GLB,,, | Performed by: INTERNAL MEDICINE

## 2023-04-12 PROCEDURE — 99999 PR PBB SHADOW E&M-EST. PATIENT-LVL III: CPT | Mod: PBBFAC,,,

## 2023-04-12 PROCEDURE — G0108 DIAB MANAGE TRN  PER INDIV: HCPCS | Mod: S$GLB,,, | Performed by: INTERNAL MEDICINE

## 2023-04-13 NOTE — PROGRESS NOTES
Diabetes Care Specialist Progress Note  Author: Lalitha Stroud RN, CDE  Date: 4/13/2023    Program Intake  Reason for Diabetes Program Visit:: Intervention  Type of Intervention:: Individual  Individual: Education  Education: Pattern Management, Advanced Pump  Current diabetes risk level:: moderate    Lab Results   Component Value Date    HGBA1C 7.5 (H) 03/23/2023     Diabetes Self-Management Skills Assessment    Diabetes Disease Process/Treatment Options  Diabetes Disease Process/Treatment Options: Skills Assessment Completed: No  Deferred due to:: Time    Nutrition/Healthy Eating  Nutrition/Healthy Eating Skills Assessment Completed:: No  Deffered due to:: Time    Physical Activity/Exercise  Physical Activity/Exercise Skills Assessment Completed: : No  Deffered due to:: Time    Medications  Patient is able to describe current diabetes management routine.: yes  Diabetes management routine:: diet, insulin, insulin pump  Patient is able to identify current diabetes medications, dosages, and appropriate timing of medications.: yes  Patient understands the purpose of the medications taken for diabetes.: yes  Patient reports problems or concerns with current medication regimen.: yes  Medication regimen problems/concerns:: unsure of doses  Medication Skills Assessment Completed:: Yes  Assessment indicates:: Knowledge deficit, Instruction Needed  Area of need?: Yes    Home Blood Glucose Monitoring  Patient states that blood sugar is checked at home daily.: yes  Monitoring Method:: personal continuous glucose monitor  Personal CGM type:: Dexcom G6  Patient is able to use personal CGM appropriately.: yes  CGM Report reviewed?: yes  Home Blood Glucose Monitoring Skills Assessment Completed: : Yes  Assessment indicates:: Adequate understanding  Area of need?: No    Acute Complications  Acute Complications Skills Assessment Completed: : No  Deffered due to:: Time    Chronic Complications  Chronic Complications Skills Assessment  Completed: : No  Deferred due to:: Time    Psychosocial/Coping  Psychosocial/Coping Skills Assessment Completed: : No  Deffered due to:: Time    Diabetes Self Support Plan     Assessment Summary and Plan    Based on today's diabetes care assessment, the following areas of need were identified:      Social 4/26/2022   Access to [employer]/Tech No   Cognitive/Behavioral Health No   Culture/Confucianist No   Communication No   Health Literacy No      Clinical 3/22/2023   Nutritional Status No      Diabetes Self-Management Skills 4/12/2023   Diabetes Disease Process/Treatment Options -   Nutrition/Healthy Eating -   Physical Activity/Exercise -   Medication Yes: see care planning    Home Blood Glucose Monitoring No   Acute Complications -   Chronic Complications -   Psychosocial/Coping -      Today's interventions were provided through individual discussion, instruction, and written materials were provided.      Patient verbalized understanding of instruction and written materials.  Pt was able to return back demonstration of instructions today. Patient understood key points, needs reinforcement and further instruction.     Diabetes Self-Management Care Plan:    Today's Diabetes Self-Management Care Plan was developed with Nithin's input. Nithin has agreed to work toward the following goal(s) to improve his/her overall diabetes control.      Care Plan: Diabetes Management   Updates made since 3/14/2023 12:00 AM        Problem: Medications         Goal: Pt will use Omnipod 5 system to deliver insulin continuously.    Start Date: 4/5/2023   Expected End Date: 5/5/2023   This Visit's Progress: On track   Priority: High   Barriers: No Barriers Identified   Note:    OMNI POD 5 INSULIN PUMP START    Pump training was provided per Omni Pod protocol. First time using pump therapy. Settings per Dr. Kevin Lares.    Basal:  12AM: 1.6 units/hr     Max basal rate: 3.5 u/hr     Bolus:  CHO ratio: 1:6  ISF: 1:25  Glucose target : 150  mg/dL  Correct  over: 150 mg/dl  Active insulin time: 4 hours  Max bolus: 25 units     Low reservoir insulin alarm: 10 units  Change pod alarm: every 3 days       Details of pump therapy were covered included following controller features and programming, pod activation, pod site selection and rotation, automatic pod priming and insertion, setting & editing basal rates in manual mode, giving bolus and other features in the set-up menu.  The following regarding the Omnipod 5 was covered:  During your first Pod wear, since no recent history is available, the Omnipod 5 System uses only your active Basal Program from Manual Mode as a starting point to adjust your insulin. After 48 hours of history is collected, which usually happens with the first Pod wear, [facility] technology stops adjusting insulin against your active Basal Program and starts using the Adaptive Basal Rate for your automated insulin delivery with your next Pod change. With each Pod change, insulin delivery information is sent and saved in the Omnipod 5 Ashia so that the next Pod that is started is updated with the new Adaptive Basal Rate. With each new Pod activation, the system adapts insulin delivery based on physiological needs and total daily insulin (TDI) delivered. After 2-3 Pod changes, adaptation generally stabilizes and automated insulin delivery is based on this adaptation.  Patient demonstrated ability to program controller, activate and insert pod using aseptic technique.  Patient demonstrated ability to program Dexcom transmitter into controller and start automated limited mode.    Instructed pt on use of basic pump features ie...give a bolus, pause insulin, switch from manual to automated mode.  Reviewed features available in manual mode verses automated mode.   Reviewed when and how to use activity function in automated mode.  Reviewed site selection of pods, rotation of sites and hard stop to change pod every 72 hrs.   Instructed that  insulin vial is good out of refrigeration for up to 28-30 days.   Reviewed treatment of hypoglycemia, hyperglycemia; sick day care, DKA, and troubleshooting of pump.  Omni Pod 24-hour support line provided.       Ac username: Terry Shen password: [email or web address]     Brianda username: [email or web address]  Brianda password: [email or web address]     4/12/23 - Pt has been using Omnipod well - in automated mode 100% of the time. Pt does present w/ some complaints about the adhesive on pods r/t application. Reviewed how to hold the pod to prevent adhesive errors when applying. Pt wants to use mobile robert instead of the controller. Omnipod 5 robert downloaded and compatible. Controller pod deactivated and controller turned off. New pod applied using robert. Reviewed adaptivity process will restart. Pt does admit to entering phantom carbs in pump d/t elevations. Also resumed Tresiba 40u QHS in spite of instructions to stop - however - automated mode adjusting therapy and no low BG episodes. Will review w/ endo d/t high insulin needs and current success on pump report (TIR 77%). Pt unable to afford Mounjaro, so unlikely will be able to resume GLP-1 therapy at this time. Reviewed low BG plan d/t high insulin dosing.        Task: Reviewed with patient all current diabetes medications and provided basic review of the purpose, dosage, frequency, side effects, and storage of both oral and injectable diabetes medications. Completed 4/5/2023        Task: Discussed guidelines for preventing, detecting and treating hypoglycemia and hyperglycemia and reviewed the importance of meal and medication timing with diabetes mediations for prevention of hypoglycemia and maximum drug benefit. Completed 4/5/2023        Goal: Pt will being Mounjaro 2.5 mg weekly injectable.    Start Date: 4/5/2023   Expected End Date: 5/5/2023   This Visit's Progress: Not met   Priority: High   Barriers: Financial   Note:    4/5/23 - Pt has Mounjaro ordered to help  reduce TDD of insulin, to reduce frequency of pod changes. Daily pod changes somewhat expensive. Pt hoping that pump therapy will help reduce his TDD needs.     4/12/23 - Pt unable to afford Mounjaro.        Task: Reviewed with patient all current diabetes medications and provided basic review of the purpose, dosage, frequency, side effects, and storage of both oral and injectable diabetes medications. Completed 4/5/2023        Task: Reviewed possible resources for acquiring cost prohibitive medication. Completed 4/5/2023        Task: Instructed patient on how to self-administer Mounjaro. Completed 4/5/2023        Task: Discussed guidelines for preventing, detecting and treating hypoglycemia and hyperglycemia and reviewed the importance of meal and medication timing with diabetes mediations for prevention of hypoglycemia and maximum drug benefit. Completed 4/5/2023        Follow Up Plan     Follow up in about 13 days (around 4/25/2023) for pump upload and review. .    Today's care plan and follow up schedule was discussed with patient.  Nithin verbalized understanding of the care plan, goals, and agrees to follow up plan.        The patient was encouraged to communicate with his/her health care provider/physician and care team regarding his/her condition(s) and treatment.  I provided the patient with my contact information today and encouraged to contact me via phone or [facility]'s Patient Portal as needed.     Length of Visit   Total Time: 60 Minutes

## 2023-04-14 ENCOUNTER — PATIENT MESSAGE (OUTPATIENT)
Dept: DIABETES | Facility: CLINIC | Age: 68
End: 2023-04-14
Payer: MEDICARE

## 2023-04-19 ENCOUNTER — LAB VISIT (OUTPATIENT)
Dept: LAB | Facility: OTHER | Age: 68
End: 2023-04-19
Attending: INTERNAL MEDICINE
Payer: MEDICARE

## 2023-04-19 ENCOUNTER — DOCUMENT SCAN (OUTPATIENT)
Dept: HOME HEALTH SERVICES | Facility: HOSPITAL | Age: 68
End: 2023-04-19
Payer: MEDICARE

## 2023-04-19 DIAGNOSIS — E11.65 TYPE 2 DIABETES MELLITUS WITH HYPERGLYCEMIA, WITH LONG-TERM CURRENT USE OF INSULIN: ICD-10-CM

## 2023-04-19 DIAGNOSIS — Z79.4 TYPE 2 DIABETES MELLITUS WITH HYPERGLYCEMIA, WITH LONG-TERM CURRENT USE OF INSULIN: ICD-10-CM

## 2023-04-19 LAB
ALBUMIN/CREAT UR: 7.6 UG/MG (ref 0–30)
CREAT UR-MCNC: 78.6 MG/DL (ref 23–375)
MICROALBUMIN UR DL<=1MG/L-MCNC: 6 UG/ML

## 2023-04-19 PROCEDURE — 82570 ASSAY OF URINE CREATININE: CPT | Performed by: INTERNAL MEDICINE

## 2023-04-20 ENCOUNTER — PATIENT MESSAGE (OUTPATIENT)
Dept: ENDOCRINOLOGY | Facility: CLINIC | Age: 68
End: 2023-04-20
Payer: MEDICARE

## 2023-04-20 ENCOUNTER — PATIENT MESSAGE (OUTPATIENT)
Dept: DIABETES | Facility: CLINIC | Age: 68
End: 2023-04-20
Payer: MEDICARE

## 2023-04-25 ENCOUNTER — CLINICAL SUPPORT (OUTPATIENT)
Dept: DIABETES | Facility: CLINIC | Age: 68
End: 2023-04-25
Payer: MEDICARE

## 2023-04-25 VITALS — BODY MASS INDEX: 29.82 KG/M2 | HEIGHT: 70 IN | WEIGHT: 208.31 LBS

## 2023-04-25 DIAGNOSIS — E11.65 TYPE 2 DIABETES MELLITUS WITH HYPERGLYCEMIA, WITH LONG-TERM CURRENT USE OF INSULIN: Primary | ICD-10-CM

## 2023-04-25 DIAGNOSIS — Z79.4 TYPE 2 DIABETES MELLITUS WITH HYPERGLYCEMIA, WITH LONG-TERM CURRENT USE OF INSULIN: Primary | ICD-10-CM

## 2023-04-25 PROCEDURE — G0108 DIAB MANAGE TRN  PER INDIV: HCPCS | Mod: S$GLB,,, | Performed by: FAMILY MEDICINE

## 2023-04-25 PROCEDURE — 99999 PR PBB SHADOW E&M-EST. PATIENT-LVL I: ICD-10-PCS | Mod: PBBFAC,,,

## 2023-04-25 PROCEDURE — 99999 PR PBB SHADOW E&M-EST. PATIENT-LVL I: CPT | Mod: PBBFAC,,,

## 2023-04-25 PROCEDURE — G0108 PR DIAB MANAGE TRN  PER INDIV: ICD-10-PCS | Mod: S$GLB,,, | Performed by: FAMILY MEDICINE

## 2023-04-25 NOTE — PROGRESS NOTES
Diabetes Care Specialist Progress Note  Author: Lalitha Stroud RN, CDE  Date: 4/25/2023    Program Intake  Reason for Diabetes Program Visit:: Intervention  Type of Intervention:: Individual  Individual: Education  Education: Self-Management Skill Review, Nutrition and Meal Planning, Pattern Management  Current diabetes risk level:: moderate    Lab Results   Component Value Date    HGBA1C 7.4 (H) 04/19/2023     Clinical    Nutritional Status  Meal Plan 24 Hour Recall - Breakfast: 3 slices huynh, 1/4 cup grits, 1 biscuit, 1.5 scrambled eggs - coffee  Meal Plan 24 Hour Recall - Lunch: chicken salad sandwich  Meal Plan 24 Hour Recall - Dinner: fried chicken, cabbage, 1.5 cup red beans, 1/4 cup white rice    Diabetes Self-Management Skills Assessment    Diabetes Disease Process/Treatment Options  Diabetes Disease Process/Treatment Options: Skills Assessment Completed: No  Deferred due to:: Time    Nutrition/Healthy Eating  Challenges to healthy eating:: portion control  Method of carbohydrate measurement:: carb counting/reading labels  Patient can identify foods that impact blood sugar.: yes  Patient-identified foods:: fruit/fruit juice, soda, milk, starchy vegetables (corn, peas, beans), starches (bread, pasta, rice, cereal), sweets, yogurt, other (see comments) (coffee raises blood sugar)  Nutrition/Healthy Eating Skills Assessment Completed:: Yes  Assessment indicates:: Adequate understanding  Area of need?: No    Physical Activity/Exercise  Physical Activity/Exercise Skills Assessment Completed: : No  Deffered due to:: Time    Medications  Patient is able to describe current diabetes management routine.: yes  Diabetes management routine:: diet, insulin, insulin pump  Patient is able to identify current diabetes medications, dosages, and appropriate timing of medications.: yes  Patient understands the purpose of the medications taken for diabetes.: yes  Patient reports problems or concerns with current medication  regimen.: no  Medication Skills Assessment Completed:: Yes  Assessment indicates:: Adequate understanding, Instruction Needed  Area of need?: Yes    Home Blood Glucose Monitoring  Patient states that blood sugar is checked at home daily.: yes  Monitoring Method:: personal continuous glucose monitor  Personal CGM type:: Dexcom G6  Patient is able to use personal CGM appropriately.: yes  CGM Report reviewed?: yes  Home Blood Glucose Monitoring Skills Assessment Completed: : Yes  Assessment indicates:: Adequate understanding  Area of need?: No    Acute Complications  Patient is able to identify types of acute complications: Yes  Patient Identified:: Hypoglycemia, Hyperglycemia  Patient is able to state the basic meaning of hypoglycemia?: Yes  Able to state the blood sugar range for hypoglycemia?: yes  Patient stated range:: <90 - less symptomatic when at regular sugars now  Patient can identify general symptoms of hypoglycemia: yes  Patient identified:: shakiness, fatigue  Able to state proper treatment of hypoglycemia?: yes  Patient identified:: 1/2 can soda/fruit juice  Patient is able to state the basic meaning of hyperglycemia?: Yes  Able to state the blood sugar range for hyperglycemia?: yes  Patient stated range:: >150 - gets dizzy if stays too high for too long  Patient able to state proper treatment of hyperglycemia?: yes  Patient identified:: increase water intake, monitor blood sugar, take medication as recommended  Acute Complications Skills Assessment Completed: : Yes  Assessment indicates:: Adequate understanding  Area of need?: No    Chronic Complications  Chronic Complications Skills Assessment Completed: : No  Deferred due to:: Time    Psychosocial/Coping  Psychosocial/Coping Skills Assessment Completed: : No  Deffered due to:: Time    Diabetes Self Support Plan     Assessment Summary and Plan    Based on today's diabetes care assessment, the following areas of need were identified:      Social  4/26/2022   Access to Mass Media/Tech No   Cognitive/Behavioral Health No   Culture/Adventism No   Communication No   Health Literacy No      Clinical 3/22/2023   Nutritional Status No      Diabetes Self-Management Skills 4/25/2023   Diabetes Disease Process/Treatment Options -   Nutrition/Healthy Eating No   Physical Activity/Exercise -   Medication Yes: see care planning - is doing very well w/ tresiba/Omnipod 5   Home Blood Glucose Monitoring No   Acute Complications No   Chronic Complications -   Psychosocial/Coping -      Today's interventions were provided through individual discussion, instruction, and written materials were provided.      Patient verbalized understanding of instruction and written materials.  Pt was able to return back demonstration of instructions today. Patient understood key points, needs reinforcement and further instruction.     Diabetes Self-Management Care Plan:    Today's Diabetes Self-Management Care Plan was developed with Nithin's input. Nithin has agreed to work toward the following goal(s) to improve his/her overall diabetes control.      Care Plan: Diabetes Management   Updates made since 3/26/2023 12:00 AM        Problem: Medications         Goal: Pt will use Omnipod 5 system to deliver insulin continuously.    Start Date: 4/5/2023   Expected End Date: 5/5/2023   This Visit's Progress: Met   Recent Progress: On track   Priority: High   Barriers: No Barriers Identified   Note:    OMNI POD 5 INSULIN PUMP START    Pump training was provided per Omni Pod protocol. First time using pump therapy. Settings per Dr. Kevin Lares.    Basal:  12AM: 1.6 units/hr     Max basal rate: 3.5 u/hr     Bolus:  CHO ratio: 1:6  ISF: 1:25  Glucose target : 150 mg/dL  Correct  over: 150 mg/dl  Active insulin time: 4 hours  Max bolus: 25 units     Low reservoir insulin alarm: 10 units  Change pod alarm: every 3 days       Details of pump therapy were covered included following controller features and  programming, pod activation, pod site selection and rotation, automatic pod priming and insertion, setting & editing basal rates in manual mode, giving bolus and other features in the set-up menu.  The following regarding the Omnipod 5 was covered:  During your first Pod wear, since no recent history is available, the Omnipod 5 System uses only your active Basal Program from Manual Mode as a starting point to adjust your insulin. After 48 hours of history is collected, which usually happens with the first Pod wear, SmartAppirio technology stops adjusting insulin against your active Basal Program and starts using the Adaptive Basal Rate for your automated insulin delivery with your next Pod change. With each Pod change, insulin delivery information is sent and saved in the Omnipod 5 Ashia so that the next Pod that is started is updated with the new Adaptive Basal Rate. With each new Pod activation, the system adapts insulin delivery based on physiological needs and total daily insulin (TDI) delivered. After 2-3 Pod changes, adaptation generally stabilizes and automated insulin delivery is based on this adaptation.  Patient demonstrated ability to program controller, activate and insert pod using aseptic technique.  Patient demonstrated ability to program Dexcom transmitter into controller and start automated limited mode.    Instructed pt on use of basic pump features ie...give a bolus, pause insulin, switch from manual to automated mode.  Reviewed features available in manual mode verses automated mode.   Reviewed when and how to use activity function in automated mode.  Reviewed site selection of pods, rotation of sites and hard stop to change pod every 72 hrs.   Instructed that insulin vial is good out of refrigeration for up to 28-30 days.   Reviewed treatment of hypoglycemia, hyperglycemia; sick day care, DKA, and troubleshooting of pump.  Omni Pod 24-hour support line provided.       Podder username:  [email or web address]  Ruthder password: [email or web address]     Brianda username: [email or web address]  Brianda password: [email or web address]     4/12/23 - Pt has been using Omnipod well - in automated mode 100% of the time. Pt does present w/ some complaints about the adhesive on pods r/t application. Reviewed how to hold the pod to prevent adhesive errors when applying. Pt wants to use mobile robert instead of the controller. Omnipod 5 robert downloaded and compatible. Controller pod deactivated and controller turned off. New pod applied using robret. Reviewed adaptivity process will restart. Pt does admit to entering phantom carbs in pump d/t elevations. Also resumed Tresiba 40u QHS in spite of instructions to stop - however - automated mode adjusting therapy and no low BG episodes. Will review w/ endo d/t high insulin needs and current success on pump report (TIR 77%). Pt unable to afford Mounjaro, so unlikely will be able to resume GLP-1 therapy at this time. Reviewed low BG plan d/t high insulin dosing.        Task: Reviewed with patient all current diabetes medications and provided basic review of the purpose, dosage, frequency, side effects, and storage of both oral and injectable diabetes medications. Completed 4/5/2023        Task: Discussed guidelines for preventing, detecting and treating hypoglycemia and hyperglycemia and reviewed the importance of meal and medication timing with diabetes mediations for prevention of hypoglycemia and maximum drug benefit. Completed 4/5/2023        Goal: Pt will being Mounjaro 2.5 mg weekly injectable. Deleted 4/25/2023   Start Date: 4/5/2023   Expected End Date: 5/5/2023   Recent Progress: Not met   Priority: High   Barriers: Financial   Note:    4/5/23 - Pt has Mounjaro ordered to help reduce TDD of insulin, to reduce frequency of pod changes. Daily pod changes somewhat expensive. Pt hoping that pump therapy will help reduce his TDD needs.     4/12/23 - Pt unable to afford Mounjaro.        Task:  Reviewed with patient all current diabetes medications and provided basic review of the purpose, dosage, frequency, side effects, and storage of both oral and injectable diabetes medications. Completed 4/5/2023        Task: Reviewed possible resources for acquiring cost prohibitive medication. Completed 4/5/2023        Task: Instructed patient on how to self-administer Mounjaro. Completed 4/5/2023        Task: Discussed guidelines for preventing, detecting and treating hypoglycemia and hyperglycemia and reviewed the importance of meal and medication timing with diabetes mediations for prevention of hypoglycemia and maximum drug benefit. Completed 4/5/2023        Problem: Chronic Complications         Goal: Pt will schedule appointment w/ new endocrine provider for summer of 2023.    Start Date: 4/25/2023   Expected End Date: 5/25/2023   Priority: High   Barriers: No Barriers Identified   Note:    4/25/23 - Pt seeing Dr. Lares tomorrow, 4/25. Needs to est. Care w/ new provider d/t leaving system. Goal to be seen summer for f/u.        Task: Discussed current A1c, Blood Pressure, and Cholesterol results (ABCs of Diabetes) and reviewed targets of each. Completed 4/25/2023        Task: Discussed importance of annual microalbumin urine test to monitor kidney function.         Task: Discussed importance of annual dilated eye exam for prevention of retinopathy.         Task: Discussed the importance of routine dental exams for the prevention of gum disease and gingivitis and encouraged dental exam every 6 months.         Task: Instructed on basic daily foot care and encouraged inspecting feet daily.         Task: Discussed importance of the Flu and Pneumonia Vaccination.         Follow Up Plan     Follow up in about 29 days (around 5/24/2023) for review of omnipod upload. .    Today's care plan and follow up schedule was discussed with patient.  Nithin verbalized understanding of the care plan, goals, and agrees to  follow up plan.        The patient was encouraged to communicate with his/her health care provider/physician and care team regarding his/her condition(s) and treatment.  I provided the patient with my contact information today and encouraged to contact me via phone or [facility] Patient Portal as needed.     Length of Visit   Total Time: 60 Minutes

## 2023-04-26 ENCOUNTER — OFFICE VISIT (OUTPATIENT)
Dept: ENDOCRINOLOGY | Facility: CLINIC | Age: 68
End: 2023-04-26
Payer: MEDICARE

## 2023-04-26 VITALS
DIASTOLIC BLOOD PRESSURE: 75 MMHG | WEIGHT: 208.31 LBS | BODY MASS INDEX: 29.82 KG/M2 | SYSTOLIC BLOOD PRESSURE: 136 MMHG | HEART RATE: 95 BPM | HEIGHT: 70 IN

## 2023-04-26 DIAGNOSIS — I10 ESSENTIAL HYPERTENSION: ICD-10-CM

## 2023-04-26 DIAGNOSIS — Z79.4 TYPE 2 DIABETES MELLITUS WITH HYPERGLYCEMIA, WITH LONG-TERM CURRENT USE OF INSULIN: Primary | ICD-10-CM

## 2023-04-26 DIAGNOSIS — E11.65 TYPE 2 DIABETES MELLITUS WITH HYPERGLYCEMIA, WITH LONG-TERM CURRENT USE OF INSULIN: Primary | ICD-10-CM

## 2023-04-26 PROCEDURE — 1125F PR PAIN SEVERITY QUANTIFIED, PAIN PRESENT: ICD-10-PCS | Mod: CPTII,S$GLB,, | Performed by: INTERNAL MEDICINE

## 2023-04-26 PROCEDURE — 3078F PR MOST RECENT DIASTOLIC BLOOD PRESSURE < 80 MM HG: ICD-10-PCS | Mod: CPTII,S$GLB,, | Performed by: INTERNAL MEDICINE

## 2023-04-26 PROCEDURE — 1125F AMNT PAIN NOTED PAIN PRSNT: CPT | Mod: CPTII,S$GLB,, | Performed by: INTERNAL MEDICINE

## 2023-04-26 PROCEDURE — 3066F NEPHROPATHY DOC TX: CPT | Mod: CPTII,S$GLB,, | Performed by: INTERNAL MEDICINE

## 2023-04-26 PROCEDURE — 99214 PR OFFICE/OUTPT VISIT, EST, LEVL IV, 30-39 MIN: ICD-10-PCS | Mod: S$GLB,,, | Performed by: INTERNAL MEDICINE

## 2023-04-26 PROCEDURE — 1101F PR PT FALLS ASSESS DOC 0-1 FALLS W/OUT INJ PAST YR: ICD-10-PCS | Mod: CPTII,S$GLB,, | Performed by: INTERNAL MEDICINE

## 2023-04-26 PROCEDURE — 3051F PR MOST RECENT HEMOGLOBIN A1C LEVEL 7.0 - < 8.0%: ICD-10-PCS | Mod: CPTII,S$GLB,, | Performed by: INTERNAL MEDICINE

## 2023-04-26 PROCEDURE — 3288F PR FALLS RISK ASSESSMENT DOCUMENTED: ICD-10-PCS | Mod: CPTII,S$GLB,, | Performed by: INTERNAL MEDICINE

## 2023-04-26 PROCEDURE — 1160F RVW MEDS BY RX/DR IN RCRD: CPT | Mod: CPTII,S$GLB,, | Performed by: INTERNAL MEDICINE

## 2023-04-26 PROCEDURE — 3061F NEG MICROALBUMINURIA REV: CPT | Mod: CPTII,S$GLB,, | Performed by: INTERNAL MEDICINE

## 2023-04-26 PROCEDURE — 4010F ACE/ARB THERAPY RXD/TAKEN: CPT | Mod: CPTII,S$GLB,, | Performed by: INTERNAL MEDICINE

## 2023-04-26 PROCEDURE — 1159F MED LIST DOCD IN RCRD: CPT | Mod: CPTII,S$GLB,, | Performed by: INTERNAL MEDICINE

## 2023-04-26 PROCEDURE — 1160F PR REVIEW ALL MEDS BY PRESCRIBER/CLIN PHARMACIST DOCUMENTED: ICD-10-PCS | Mod: CPTII,S$GLB,, | Performed by: INTERNAL MEDICINE

## 2023-04-26 PROCEDURE — 3075F SYST BP GE 130 - 139MM HG: CPT | Mod: CPTII,S$GLB,, | Performed by: INTERNAL MEDICINE

## 2023-04-26 PROCEDURE — 3008F BODY MASS INDEX DOCD: CPT | Mod: CPTII,S$GLB,, | Performed by: INTERNAL MEDICINE

## 2023-04-26 PROCEDURE — 3051F HG A1C>EQUAL 7.0%<8.0%: CPT | Mod: CPTII,S$GLB,, | Performed by: INTERNAL MEDICINE

## 2023-04-26 PROCEDURE — 1101F PT FALLS ASSESS-DOCD LE1/YR: CPT | Mod: CPTII,S$GLB,, | Performed by: INTERNAL MEDICINE

## 2023-04-26 PROCEDURE — 3288F FALL RISK ASSESSMENT DOCD: CPT | Mod: CPTII,S$GLB,, | Performed by: INTERNAL MEDICINE

## 2023-04-26 PROCEDURE — 4010F PR ACE/ARB THEARPY RXD/TAKEN: ICD-10-PCS | Mod: CPTII,S$GLB,, | Performed by: INTERNAL MEDICINE

## 2023-04-26 PROCEDURE — 99214 OFFICE O/P EST MOD 30 MIN: CPT | Mod: S$GLB,,, | Performed by: INTERNAL MEDICINE

## 2023-04-26 PROCEDURE — 3061F PR NEG MICROALBUMINURIA RESULT DOCUMENTED/REVIEW: ICD-10-PCS | Mod: CPTII,S$GLB,, | Performed by: INTERNAL MEDICINE

## 2023-04-26 PROCEDURE — 3008F PR BODY MASS INDEX (BMI) DOCUMENTED: ICD-10-PCS | Mod: CPTII,S$GLB,, | Performed by: INTERNAL MEDICINE

## 2023-04-26 PROCEDURE — 3075F PR MOST RECENT SYSTOLIC BLOOD PRESS GE 130-139MM HG: ICD-10-PCS | Mod: CPTII,S$GLB,, | Performed by: INTERNAL MEDICINE

## 2023-04-26 PROCEDURE — 1159F PR MEDICATION LIST DOCUMENTED IN MEDICAL RECORD: ICD-10-PCS | Mod: CPTII,S$GLB,, | Performed by: INTERNAL MEDICINE

## 2023-04-26 PROCEDURE — 3066F PR DOCUMENTATION OF TREATMENT FOR NEPHROPATHY: ICD-10-PCS | Mod: CPTII,S$GLB,, | Performed by: INTERNAL MEDICINE

## 2023-04-26 PROCEDURE — 3078F DIAST BP <80 MM HG: CPT | Mod: CPTII,S$GLB,, | Performed by: INTERNAL MEDICINE

## 2023-04-26 RX ORDER — INSULIN ASPART 100 [IU]/ML
INJECTION, SOLUTION INTRAVENOUS; SUBCUTANEOUS
Qty: 50 ML | Refills: 11 | Status: SHIPPED | OUTPATIENT
Start: 2023-04-26 | End: 2023-06-19

## 2023-04-26 NOTE — PROGRESS NOTES
Subjective:      Chief Complaint: Diabetes (DM2)    HPI: Nithin Wagner is a 67 y.o. male who is here for a follow-up evaluation for diabetes. Last seen 1/18/2023    Patient reported in 2007, had heart issues (atrial myxoma), surgery, issues with anesthesia. Then had pain, f/u showed lung issues, then surgery 2008. Later RA, on mtx, other meds. Including steroids.   - currently on 10 mg prednisone per day, sometimes 15 mg alternating days    With regards to the diabetes:  Diagnosed with T2DM since around [id number]. Had symptoms, sugar 300. Metformin initially, other pills, GLP1, then lately insulin.     Known complications:    Retinopathy? Denies    Nephropathy? No    Neuropathy? No    CAD? Yes, hx stents. Also HFpEF    CVA? TIA with myxoma, denies CVA in the past     Current regimen:   Tresiba 25 units/day   Omnipod 5, around 60 units/day    Basal 1.6  Carb 6  SF 25    Target: 150 overnight   Starting at 7am, target 130    Missed doses? denies     Prior treatments:    onglyza   metformin. Wasn't helping as much   other meds   Trulicity: 4.5 mg/week. Tuesdays. Stopped in the hospital   Basal insulin: Tresiba 44 units daily   Prandial insulin: Novolog 14 units TIDWM plus scale.   Had been using InPen system      Self-Monitored blood glucose.  # times a day testing: dexcom CGM  Log reviewed: yes    Reasonable for the most part.  70% in range  29% high    Some spikes after meals but not too often/high, often comes back down    Hypoglycemic events? Not lately    Working on diet    Current Symptoms  No   Yes  [x]    []  Polydipsia  []    [x]  Polyuria, sometimes  []    [x]  Vision changes  []    [x]  Nausea  [x]    []  Diarrhea  [x]    []  Weight gain  []    [x]  Weight loss. Few lbs. Pt reports weight gain last few days    Lower libido. Normal testosterone 2022.    Diabetes Management Status    Statin: Not taking, didn't tolerate statin  ACE/ARB: Taking     Screening or Prevention Patient's value Goal  Complete/Controlled?   HgA1C Testing and Control   Lab Results   Component Value Date    HGBA1C 7.4 (H) 04/19/2023      q6months/Less than 7.5% yes   Lipid profile : 04/19/2023 Annually Yes   LDL control Lab Results   Component Value Date    LDLCALC 106.6 04/19/2023    Annually/Less than 100 mg/dl  no   Nephropathy screening Lab Results   Component Value Date    MICALBCREAT 7.6 04/19/2023     Lab Results   Component Value Date    CREATININE 0.9 04/19/2023     Lab Results   Component Value Date    PROTEINUA Negative 01/26/2023    Annually yes   Blood pressure BP Readings from Last 1 Encounters:   04/26/23 136/75    Less than 140/90 yes   Dilated retinal exam : 09/05/2017 Annually No   Foot exam   : 05/06/2021 Annually No     Reviewed past medical, family, social history and updated as appropriate.     Review of Systems  As above    Objective:     Vitals:    04/26/23 1254   BP: 136/75   Pulse: 95     Prior vitals:  BP Readings from Last 5 Encounters:   04/26/23 136/75   03/31/23 (!) 148/89   03/01/23 110/64   02/19/23 130/85   02/06/23 (!) 140/80     Physical Exam  Vitals reviewed.   Constitutional:       General: He is not in acute distress.  Neck:      Thyroid: No thyromegaly.   Cardiovascular:      Heart sounds: Normal heart sounds.   Pulmonary:      Effort: Pulmonary effort is normal.     Wt Readings from Last 10 Encounters:   04/26/23 1254 94.5 kg (208 lb 5.4 oz)   04/25/23 1238 94.5 kg (208 lb 5.4 oz)   03/31/23 1551 94.3 kg (208 lb)   03/22/23 1347 94.4 kg (208 lb 1.8 oz)   03/01/23 1441 94 kg (207 lb 3.7 oz)   03/01/23 1404 94.3 kg (208 lb)   02/19/23 1429 91.6 kg (202 lb)   02/06/23 1325 92 kg (202 lb 12.8 oz)   02/01/23 1611 96.3 kg (212 lb 4.9 oz)   01/30/23 1429 98.3 kg (216 lb 11.4 oz)     Lab Results   Component Value Date    HGBA1C 7.4 (H) 04/19/2023     Lab Results   Component Value Date    CHOL 172 04/19/2023    HDL 44 04/19/2023    LDLCALC 106.6 04/19/2023    TRIG 107 04/19/2023    CHOLHDL 25.6  04/19/2023     Lab Results   Component Value Date     04/19/2023    K 4.3 04/19/2023     04/19/2023    CO2 29 04/19/2023     (H) 04/19/2023    BUN 23 04/19/2023    CREATININE 0.9 04/19/2023    CALCIUM 9.6 04/19/2023    PROT 7.2 03/23/2023    ALBUMIN 3.8 03/23/2023    BILITOT 0.4 03/23/2023    ALKPHOS 97 03/23/2023    AST 15 03/23/2023    ALT 23 03/23/2023    ANIONGAP 9 04/19/2023    ESTGFRAFRICA >60 07/21/2022    EGFRNONAA >60 07/21/2022    TSH 0.999 04/19/2023      Lab Results   Component Value Date    MICALBCREAT 7.6 04/19/2023     Assessment/Plan:     Type 2 diabetes mellitus with hyperglycemia, with long-term current use of insulin  Reviewed goals of therapy - to get the best control we can without hypoglycemia. Goal <7.5   - last a1c pretty much at goal  On pump review, overall sugar reasonable    Pt on tresiba 25 units/day plus about 60 units/day in omnipod.   pt interested in stopping tresiba. Reasonable, if able to get enough insulin from omnipod. Reviewed would likely need to increase basal insulin to compensate. Do it gradually.    Medication changes:   Decrease to 12 units tresiba.   monitor sugar, if increasing, go to 2.0 units/hour basal rate.   after a few days, can stop tresiba and monitor again, go to 2.4 units/hr basal if becoming too elevated.    Continue pump, same settings for now    Reviewed patient's current insulin regimen. Clarified proper insulin dose and timing in relation to meals, etc. Insulin injection sites and proper rotation instructed.   - reviewed dose adjustments as well.   -Advised frequent self blood glucose monitoring. Patient encouraged to document glucose results and bring them to every clinic visit    -Hypoglycemia precautions discussed. Instructed on precautions before driving.    -Close adherence to lifestyle changes recommended.    -Periodic follow ups for eye evaluations, foot care suggested.      Essential hypertension  bp controlled  continue  regimen  F/u with PCP, monitor    Follow up in about 3 months (around 7/26/2023) for further monitoring, lab review.      Kevin Lares MD  Endocrinology

## 2023-04-26 NOTE — ASSESSMENT & PLAN NOTE
Reviewed goals of therapy - to get the best control we can without hypoglycemia. Goal <7.5   - last a1c pretty much at goal  On pump review, overall sugar reasonable    Pt on tresiba 25 units/day plus about 60 units/day in omnipod.   pt interested in stopping tresiba. Reasonable, if able to get enough insulin from omnipod. Reviewed would likely need to increase basal insulin to compensate. Do it gradually.    Medication changes:   Decrease to 12 units tresiba.   monitor sugar, if increasing, go to 2.0 units/hour basal rate.   after a few days, can stop tresiba and monitor again, go to 2.4 units/hr basal if becoming too elevated.    Continue pump, same settings for now    Reviewed patient's current insulin regimen. Clarified proper insulin dose and timing in relation to meals, etc. Insulin injection sites and proper rotation instructed.   - reviewed dose adjustments as well.   -Advised frequent self blood glucose monitoring. Patient encouraged to document glucose results and bring them to every clinic visit    -Hypoglycemia precautions discussed. Instructed on precautions before driving.    -Close adherence to lifestyle changes recommended.    -Periodic follow ups for eye evaluations, foot care suggested.

## 2023-04-26 NOTE — PATIENT INSTRUCTIONS
For diabetes:     Decrease to 12 units/day tresiba for a few days   See how sugar goes    If high, increase basal rate to 2.0 units/hour    After sugar is okay, you can try stopping tresiba entirely   If sugar going high again, adjust basal rate up to 2.4 units/hr

## 2023-04-27 DIAGNOSIS — E11.65 TYPE 2 DIABETES MELLITUS WITH HYPERGLYCEMIA, WITH LONG-TERM CURRENT USE OF INSULIN: ICD-10-CM

## 2023-04-27 DIAGNOSIS — Z79.4 TYPE 2 DIABETES MELLITUS WITH HYPERGLYCEMIA, WITH LONG-TERM CURRENT USE OF INSULIN: ICD-10-CM

## 2023-04-27 RX ORDER — INSULIN PMP CART,AUT,G6/7,CNTR
1 EACH SUBCUTANEOUS
Qty: 15 EACH | Refills: 11 | Status: SHIPPED | OUTPATIENT
Start: 2023-04-27 | End: 2023-11-24

## 2023-04-28 ENCOUNTER — PATIENT MESSAGE (OUTPATIENT)
Dept: DIABETES | Facility: CLINIC | Age: 68
End: 2023-04-28
Payer: MEDICARE

## 2023-05-10 ENCOUNTER — PATIENT MESSAGE (OUTPATIENT)
Dept: DIABETES | Facility: CLINIC | Age: 68
End: 2023-05-10
Payer: MEDICARE

## 2023-05-17 ENCOUNTER — PATIENT MESSAGE (OUTPATIENT)
Dept: RHEUMATOLOGY | Facility: CLINIC | Age: 68
End: 2023-05-17

## 2023-05-17 ENCOUNTER — OFFICE VISIT (OUTPATIENT)
Dept: RHEUMATOLOGY | Facility: CLINIC | Age: 68
End: 2023-05-17
Payer: MEDICARE

## 2023-05-17 DIAGNOSIS — D45 POLYCYTHEMIA VERA: ICD-10-CM

## 2023-05-17 DIAGNOSIS — M06.9 RHEUMATOID ARTHRITIS INVOLVING MULTIPLE JOINTS: Primary | ICD-10-CM

## 2023-05-17 PROCEDURE — 3066F NEPHROPATHY DOC TX: CPT | Mod: CPTII,95,, | Performed by: INTERNAL MEDICINE

## 2023-05-17 PROCEDURE — 3061F PR NEG MICROALBUMINURIA RESULT DOCUMENTED/REVIEW: ICD-10-PCS | Mod: CPTII,95,, | Performed by: INTERNAL MEDICINE

## 2023-05-17 PROCEDURE — 4010F ACE/ARB THERAPY RXD/TAKEN: CPT | Mod: CPTII,95,, | Performed by: INTERNAL MEDICINE

## 2023-05-17 PROCEDURE — 4010F PR ACE/ARB THEARPY RXD/TAKEN: ICD-10-PCS | Mod: CPTII,95,, | Performed by: INTERNAL MEDICINE

## 2023-05-17 PROCEDURE — 3061F NEG MICROALBUMINURIA REV: CPT | Mod: CPTII,95,, | Performed by: INTERNAL MEDICINE

## 2023-05-17 PROCEDURE — 3051F HG A1C>EQUAL 7.0%<8.0%: CPT | Mod: CPTII,95,, | Performed by: INTERNAL MEDICINE

## 2023-05-17 PROCEDURE — 99214 OFFICE O/P EST MOD 30 MIN: CPT | Mod: 95,,, | Performed by: INTERNAL MEDICINE

## 2023-05-17 PROCEDURE — 3051F PR MOST RECENT HEMOGLOBIN A1C LEVEL 7.0 - < 8.0%: ICD-10-PCS | Mod: CPTII,95,, | Performed by: INTERNAL MEDICINE

## 2023-05-17 PROCEDURE — 3066F PR DOCUMENTATION OF TREATMENT FOR NEPHROPATHY: ICD-10-PCS | Mod: CPTII,95,, | Performed by: INTERNAL MEDICINE

## 2023-05-17 PROCEDURE — 99214 PR OFFICE/OUTPT VISIT, EST, LEVL IV, 30-39 MIN: ICD-10-PCS | Mod: 95,,, | Performed by: INTERNAL MEDICINE

## 2023-05-17 RX ORDER — PREDNISONE 10 MG/1
10 TABLET ORAL DAILY
Qty: 90 TABLET | Refills: 4 | Status: SHIPPED | OUTPATIENT
Start: 2023-05-17 | End: 2023-07-27 | Stop reason: SDUPTHER

## 2023-05-17 RX ORDER — PREDNISONE 5 MG/1
5 TABLET ORAL DAILY
Qty: 90 TABLET | Refills: 3 | Status: SHIPPED | OUTPATIENT
Start: 2023-05-17 | End: 2023-07-27 | Stop reason: SDUPTHER

## 2023-05-17 NOTE — PROGRESS NOTES
Subjective:       Patient ID: Nithin Wagner is a 64 y.o. male.    Chief Complaint: No chief complaint on file.    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   He was 53 when he was diagnosed with RA. He reports he reports he was having pain and swelling in knees and ankles. He then he had involvement in hands and shoulders. He was put on MTX.  He reports that the methotrexate did help with joints but it caused confusion so he stopped it for 2 years. He was taken aleve which helped his pain for 2 years.Then he developed HTN on NSAIDS so he stopped them.  He was then put on hydrocodone 5 QID to control his pain. Reports that warm weather improves his pain. He is back on methotrexate. Today, it will be his 5th dose. He is taking folic acid 1 mg po qday.  He has never tried up to 8 pills once a week. He is taking prednisone 10mg po BID for 4 weeks. He still has some pain in ankles and knees.  He does get mild swelling in knees and ankles. He was previously on prednisone 40mg a day. He smokes 1 pack per day for 40 years    family hx:cousin: connective tissue disease    Interval history: He has not started  Orencia infections.He reports having fungal infection in groin.  He reports being diagnosed with the flu.    Past Medical History:   Diagnosis Date    Arthritis     Diabetes mellitus     Hyperlipidemia     Hypertension        Review of Systems      Review of Systems   Constitutional: Negative for fever.   Eyes: Negative for redness.   Respiratory: Negative for cough, hemoptysis, sputum production and shortness of breath.    Cardiovascular: Negative for chest pain.   Gastrointestinal: Negative for constipation and diarrhea.   Musculoskeletal: Positive for joint pain.   Skin: Negative for rash.   Neurological: Negative for headaches.   Endo/Heme/Allergies: Does not  bruise/bleed easily.         Objective:   There were no vitals taken for this visit.     Physical Exam   Constitutional: He is oriented to person, place, and time and well-developed, well-nourished, and in no distress. No distress.   HENT:   Head: Normocephalic and atraumatic.   Right Ear: External ear normal.   Eyes: Conjunctivae and EOM are normal. Pupils are equal, round, and reactive to light. Right eye exhibits no discharge. Left eye exhibits no discharge. No scleral icterus.   Neck: Normal range of motion. Neck supple. No JVD present. No tracheal deviation present. No thyromegaly present.   Cardiovascular: Normal rate and regular rhythm.    Pulmonary/Chest: Effort normal and breath sounds normal. No stridor. No respiratory distress. He has no wheezes. He has no rales. He exhibits no tenderness.   Abdominal: Soft. Bowel sounds are normal. He exhibits no distension and no mass. There is no tenderness. There is no rebound and no guarding.   Lymphadenopathy:     He has no cervical adenopathy.   Neurological: He is alert and oriented to person, place, and time. He displays normal reflexes. No cranial nerve deficit. He exhibits normal muscle tone. Coordination normal.   Skin: Skin is warm and dry. No rash noted. He is not diaphoretic. No erythema. No pallor.     Musculoskeletal: He exhibits edema and tenderness. He exhibits no deformity.      labs: reviewed  Results for CECILIA MEAD (MRN [id number]) as of 4/14/2020 12:44   Ref. Range 4/7/2020 13:52   CCP Antibodies Latest Ref Range: <5.0 U/mL 336.9 (H)   Rheumatoid Factor Latest Ref Range: 0.0 - 15.0 IU/mL 456.0 (H)       No data to display     Assessment:     67 year old with PMH of  Type II DM, HTN, gout, right atrial myxoma s/p surgery in 2018,  CAD s/p 3 stents, PE, right upper lobectomy secondary to right atrial myxoma, latent TB s/p INH x6 in 1982,Polycythemia, MARI    here  For follow up of  rheumatoid arthritis. He was requiring high doses of steroid to  function when we initially met.    Given that he has +hep B core antibody, he was started on lamivudine by hepatology but he had to stop due to GI upset.  I took him off  methotrexate give his baseline thrombocytopenia, mild LFT elevation, and prior lung surgery.  He was doing good on enbrel but developed CHF.      He is not able to function with less than prednisone 17.5mg a day. I TOLD HIM  I am concerned about long term side effects of steroids.  I was going to switch him to orencia infusions but insurance company did not pay for it. 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 His last shot of Kevzara was in September. He does not think Kevzara helped him off the steroids.  He underwent emergent excision and debridement of necrotic tissue on 11/15. Repeat washout performed 11/17/22.  He was then discharged to [facility].  Plan was to start him on Orencia infusions but he would like to hold off on biologics due to recent recurrent infections.   I discussed trial of SSZ or Arava but he prefers to wait.    Plan:       Problem List Items Addressed This Visit       None        Continue prednisone 10mg a day  No MTX given MARI and baseline thrombocytopenia, poor lung reserve  Continue folic acid 1 mg po qday   -continue  lamivudine for Hep B core antibody  Follow up with GI  No anti-tnf given CHF  No rosales kinase given history of PE  Follow up in 6 weeks    The patient location is: home  The chief complaint leading to consultation is: joint pain    Visit type: audiovisual    Face to Face time with patient: 30   minutes of total time spent on the encounter, which includes face to face time and non-face to face time preparing to see the patient (eg, review of tests), Obtaining and/or reviewing separately obtained history, Documenting clinical information in the electronic or other health record, Independently interpreting results (not separately reported) and communicating  results to the patient/family/caregiver, or Care coordination (not separately reported).         Each patient to whom he or she provides medical services by telemedicine is:  (1) informed of the relationship between the physician and patient and the respective role of any other health care provider with respect to management of the patient; and (2) notified that he or she may decline to receive medical services by telemedicine and may withdraw from such care at any time.    Notes:

## 2023-05-24 ENCOUNTER — PATIENT MESSAGE (OUTPATIENT)
Dept: INFECTIOUS DISEASES | Facility: HOSPITAL | Age: 68
End: 2023-05-24
Payer: MEDICARE

## 2023-06-07 ENCOUNTER — CLINICAL SUPPORT (OUTPATIENT)
Dept: DIABETES | Facility: CLINIC | Age: 68
End: 2023-06-07
Payer: MEDICARE

## 2023-06-07 ENCOUNTER — PATIENT MESSAGE (OUTPATIENT)
Dept: DIABETES | Facility: CLINIC | Age: 68
End: 2023-06-07

## 2023-06-07 DIAGNOSIS — E11.65 TYPE 2 DIABETES MELLITUS WITH HYPERGLYCEMIA, WITH LONG-TERM CURRENT USE OF INSULIN: Primary | ICD-10-CM

## 2023-06-07 DIAGNOSIS — Z79.4 TYPE 2 DIABETES MELLITUS WITH HYPERGLYCEMIA, WITH LONG-TERM CURRENT USE OF INSULIN: Primary | ICD-10-CM

## 2023-06-07 PROCEDURE — G0108 DIAB MANAGE TRN  PER INDIV: HCPCS | Mod: 95,,,

## 2023-06-07 PROCEDURE — G0108 PR DIAB MANAGE TRN  PER INDIV: ICD-10-PCS | Mod: 95,,,

## 2023-06-08 NOTE — PROGRESS NOTES
Diabetes Care Specialist Virtual Visit Note       The patient location is: [place]  The chief complaint leading to consultation is: Diabetes  Visit type: audiovisual  Total time spent with patient: 30 min   Each patient to whom he or she provides medical services by telemedicine is:  (1) informed of the relationship between the physician and patient and the respective role of any other health care provider with respect to management of the patient; and (2) notified that he or she may decline to receive medical services by telemedicine and may withdraw from such care at any time.    Diabetes Care Specialist Progress Note  Author: Lalitha Stroud RN, CDE  Date: 6/8/2023    Program Intake  Reason for Diabetes Program Visit:: Intervention  Type of Intervention:: Individual  Individual: Education  Education: Self-Management Skill Review, Nutrition and Meal Planning, Pattern Management  Current diabetes risk level:: moderate    Lab Results   Component Value Date    HGBA1C 7.4 (H) 04/19/2023     Diabetes Self-Management Skills Assessment    Diabetes Disease Process/Treatment Options  Diabetes Disease Process/Treatment Options: Skills Assessment Completed: No  Deferred due to:: Time    Nutrition/Healthy Eating  Nutrition/Healthy Eating Skills Assessment Completed:: No  Deffered due to:: Time    Physical Activity/Exercise  Physical Activity/Exercise Skills Assessment Completed: : No  Deffered due to:: Time    Medications  Patient is able to describe current diabetes management routine.: yes  Diabetes management routine:: diet, insulin, insulin pump  Patient is able to identify current diabetes medications, dosages, and appropriate timing of medications.: yes  Patient understands the purpose of the medications taken for diabetes.: yes  Patient reports problems or concerns with current medication regimen.: yes  Medication regimen problems/concerns:: financial concerns  Medication Skills Assessment Completed:: Yes  Assessment  indicates:: Knowledge deficit, Instruction Needed  Area of need?: Yes    Home Blood Glucose Monitoring  Patient states that blood sugar is checked at home daily.: yes  Monitoring Method:: personal continuous glucose monitor  Personal CGM type:: Dexcom G6  Patient is able to use personal CGM appropriately.: yes  CGM Report reviewed?: yes  Home Blood Glucose Monitoring Skills Assessment Completed: : Yes  Assessment indicates:: Adequate understanding  Area of need?: No    Acute Complications  Acute Complications Skills Assessment Completed: : No  Deffered due to:: Time    Chronic Complications  Chronic Complications Skills Assessment Completed: : No  Deferred due to:: Time    Psychosocial/Coping  Psychosocial/Coping Skills Assessment Completed: : No  Deffered due to:: Time    Diabetes Self Support Plan     Assessment Summary and Plan    Based on today's diabetes care assessment, the following areas of need were identified:      Social 4/26/2022   Access to [employer] No   Cognitive/Behavioral Health No   Culture/Mormon No   Communication No   Health Literacy No     Clinical 3/22/2023   Nutritional Status No      Diabetes Self-Management Skills 6/7/2023   Diabetes Disease Process/Treatment Options -   Nutrition/Healthy Eating -   Physical Activity/Exercise -   Medication Yes: see care planning    Home Blood Glucose Monitoring No   Acute Complications -   Chronic Complications -   Psychosocial/Coping -      Today's interventions were provided through individual discussion, instruction, and written materials were provided.      Patient verbalized understanding of instruction and written materials.  Pt was able to return back demonstration of instructions today. Patient understood key points, needs reinforcement and further instruction.     Diabetes Self-Management Care Plan:    Today's Diabetes Self-Management Care Plan was developed with Nithin's input. Nithin has agreed to work toward the following goal(s) to  improve his/her overall diabetes control.      Care Plan: Diabetes Management   Updates made since 5/9/2023 12:00 AM        Problem: Chronic Complications         Goal: Pt will schedule appointment w/ new endocrine provider for summer of 2023.    Start Date: 4/25/2023   Expected End Date: 5/25/2023   This Visit's Progress: Met   Priority: High   Barriers: No Barriers Identified   Note:    4/25/23 - Pt seeing Dr. Lares tomorrow, 4/25. Needs to est. Care w/ new provider d/t leaving system. Goal to be seen summer for f/u.     6/7/23 - Pt scheduled w/ NP Izabel Marc in June.        Problem: Medications         Goal: Pt will prepare for [employer] Patient Assistance Application for novolog.    Start Date: 6/7/2023   Expected End Date: 7/8/2023   Priority: High   Barriers: No Barriers Identified   Note:    6/7/23 - Pt novolog vials for pump greatly increased in price compared to insulin pens. Reviewed options to help control costs. Sounds like pt will qualify for patient assistance. Est. Care w/ new provider in 2 weeks to help sign off/coordinate PAP. Will follow along for support.        Task: Reviewed possible resources for acquiring cost prohibitive medication. Completed 6/8/2023        Follow Up Plan     Follow up in about 4 weeks (around 7/5/2023) for follow up w/ PAP for novolog. .    Today's care plan and follow up schedule was discussed with patient.  Nithin verbalized understanding of the care plan, goals, and agrees to follow up plan.        The patient was encouraged to communicate with his/her health care provider/physician and care team regarding his/her condition(s) and treatment.  I provided the patient with my contact information today and encouraged to contact me via phone or [facility] Patient Portal as needed.     Length of Visit   Total Time: 30 Minutes

## 2023-06-15 NOTE — PROGRESS NOTES
Subjective:      Chief Complaint: No chief complaint on file.    HPI: Nithin Wagner is a 67 y.o. male who is here for a follow-up evaluation for diabetes. Last seen 4/2023 with Dr De Los Santos.    The patient location is: at home  The chief complaint leading to consultation is: diabetes    Visit type: audiovisual    Face to Face time with patient: 44 minutes   60 minutes of total time spent on the encounter, which includes face to face time and non-face to face time preparing to see the patient (eg, review of tests), Obtaining and/or reviewing separately obtained history, Documenting clinical information in the electronic or other health record, Independently interpreting results (not separately reported) and communicating results to the patient/family/caregiver, or Care coordination (not separately reported).    Each patient to whom he or she provides medical services by telemedicine is:  (1) informed of the relationship between the physician and patient and the respective role of any other health care provider with respect to management of the patient; and (2) notified that he or she may decline to receive medical services by telemedicine and may withdraw from such care at any time.    Patient reported in 2007, had heart issues (atrial myxoma), surgery, issues with anesthesia. Then had pain, f/u showed lung issues, then surgery 2008. Later RA, on mtx, other meds. Including steroids.  currently on 10 mg prednisone per day, sometimes 15 mg alternating days     States he has lost around 70 pounds since Jan 2022 -diet and addition of Mounjaro. 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     He is no longer taking Mounjaro due to expense and injection fatigue    With regards to the diabetes:  Diagnosed with T2DM since around [id number]. Had symptoms, sugar 300. Metformin initially, other pills, GLP1, then lately insulin.     Known complications:    Retinopathy- Denies; needs eye  exam    Nephropathy- No    Neuropathy- No    CAD? Yes, hx stents. Also HFpEF    CVA- TIA with myxoma, denies CVA in the past     Since his last visit, he has been having problems with cost of novolog. He has been on and off of pump. States pod hurts at times, zacarias when he is giving more than 2 units. He has been going back to injections when he wants to take a pump break.     Medications    INPEN with novolog cartridges   Average <100 units of novolog daily   I:C 1:5 and states he is sometimes changing 1:15 if lower carb  ISF 20  Max      Tresiba U200 30 units daily   Tresiba U100 60 units daily    Current regimen:  Tresiba 30 units daily  States he is taking tresiba to make the pods last longer  Omnipod 5    Basal 2.4  Carb 5  ISF 20    Target: 140 overnight  Starting at 7am, target 120    37% basal 28.4 units   63% bolus 48/2 units  Automated mode 100%    Missed doses-denies     Other medications tried:   onglyza-caused HF   metformin. Wasn't helping as much   other meds   Trulicity: 4.5 mg/week. Tuesdays. Stopped in the hospital   Basal insulin: Tresiba 44 units daily   Prandial insulin: Novolog 14 units TIDWM plus scale.     #4 times a day testing  Dexcom -not connected to clinic  Log reviewed: unable to review  Hypoglycemic event- denies   Knows how to correct with 15 grams of carbs- juice, coke, or a peppermint.     Dietary recall: He is working on diet.  Eats 3 meals a day.   Snacks: cheese and crackers or sardines   Drinks: caffeine in coffee spikes blood sugar and was drinking 3 pots in the past     Exercise - He is trying to incorporate exercise -walking and using rubber bands     Education - last visit: 6/2023    Has a Medic alert tag?  Glucagon/Baqsimi    Diabetes Management Status  Statin: Not taking- did not tolerate  ACE/ARB: Taking    Lab Results   Component Value Date    HGBA1C 7.4 (H) 04/19/2023    HGBA1C 7.5 (H) 03/23/2023    HGBA1C 7.6 (H) 11/15/2022     Screening or Prevention Patient's  value Goal Complete/Controlled?   HgA1C Testing and Control   Lab Results   Component Value Date    HGBA1C 7.4 (H) 04/19/2023      Annually/Less than 8% Yes   Lipid profile : 04/19/2023 Annually Yes   LDL control Lab Results   Component Value Date    LDLCALC 106.6 04/19/2023    Annually/Less than 100 mg/dl  No   Nephropathy screening Lab Results   Component Value Date    LABMICR 6.0 04/19/2023     Lab Results   Component Value Date    PROTEINUA Negative 01/26/2023    Annually Yes   Blood pressure BP Readings from Last 1 Encounters:   04/26/23 136/75    Less than 140/90 Yes   Dilated retinal exam : 09/05/2017 Annually Yes   Foot exam   : 05/06/2021 Annually No     With regards to vitamin D def,     He is not taking vitamin D     Bone health   8/28/2020 DXA  FINDINGS:  The L1 to L4 vertebral bone mineral density is equal to 1.362 g/cm squared with a T score of 1.0.     The left femoral neck bone mineral density is equal to 0.922 g/cm squared with a T score of -1.1.     The total hip bone mineral density is equal to 0.935 g/cm squared with a T score of -1.2.     There is a 6.8% risk of a major osteoporotic fracture and a 1.6% risk of hip fracture in the next 10 years (FRAX).     Impression:  Osteopenia     Latest Reference Range & Units 10/21/22 15:15   Vit D, 25-Hydroxy 30 - 96 ng/mL 20 (L)   (L): Data is abnormally low    With regards to dyslipidemia,   Unable to tolerate statin- allergy  States he has tried several in the past and caused heart arrhythmia   Does not want to try praulent or repatha    Latest Reference Range & Units 04/19/23 10:02   Cholesterol 120 - 199 mg/dL 172   HDL 40 - 75 mg/dL 44   HDL/Cholesterol Ratio 20.0 - 50.0 % 25.6   LDL Cholesterol External 63.0 - 159.0 mg/dL 106.6   Non-HDL Cholesterol mg/dL 128   Total Cholesterol/HDL Ratio 2.0 - 5.0  3.9   Triglycerides 30 - 150 mg/dL 107     With regards to elevated prolactin,    Currently pt reports no galactorrhea  + diplopia that recently started  around 2 months ago  - chronic headaches    No unexplained weight changes.  + fatigue  He does report some sleep problems. Has pulse ox- decreasing O2. States he has CPAP but unable to tolerate mask. States he has scarring on trachea from intubation in the past    Denies cold intolerance   No nausea or vomiting  + orthostatic symptoms     The patient is not currently using any medication known to cause hyperprolactinemia such as: antipsychotics (risperidone, phenothiazines, haloperidol and butyrophenones),gastric motility drugs (metoclopramide and domperidone), or antihypertensives (methyldopa, reserpine, and verapamil).     Latest Reference Range & Units 06/21/22 07:23   Prolactin 3.5 - 19.4 ng/mL 28.8 (H)      Latest Reference Range & Units 06/21/22 07:23   FSH 0.95 - 11.95 mIU/mL 5.93   LH 0.6 - 12.1 mIU/mL 2.4   Prolactin 3.5 - 19.4 ng/mL 28.8 (H)   Sex Hormone Binding Globulin 22 - 77 nmol/L 79 (H)   Testosterone 250 - 1100 ng/dL 542   Testosterone, Bioavailable 110.0 - 575.0 ng/dL 63.7 (L)   Testosterone, Free 46.0 - 224.0 pg/mL 33.8 (L)   (H): Data is abnormally high  (L): Data is abnormally low  Reviewed past medical, family, social history and updated as appropriate.     Review of Systems   Constitutional:  Negative for fatigue and unexpected weight change.   Eyes:  Negative for visual disturbance.   Endocrine: Negative for polydipsia, polyphagia and polyuria.        Hair loss   Skin:         Pustule on abdomen that recently erupted - 3mm with drainage    As above    Objective:     There were no vitals filed for this visit.    Prior vitals:  BP Readings from Last 5 Encounters:   04/26/23 136/75   03/31/23 (!) 148/89   03/01/23 110/64   02/19/23 130/85   02/06/23 (!) 140/80     Physical Exam  Vitals reviewed.   Constitutional:       General: He is not in acute distress.  Neck:      Thyroid: No thyromegaly.   Cardiovascular:      Heart sounds: Normal heart sounds.   Pulmonary:      Effort: Pulmonary effort is  normal.     Wt Readings from Last 10 Encounters:   04/26/23 1254 94.5 kg (208 lb 5.4 oz)   04/25/23 1238 94.5 kg (208 lb 5.4 oz)   03/31/23 1551 94.3 kg (208 lb)   03/22/23 1347 94.4 kg (208 lb 1.8 oz)   03/01/23 1441 94 kg (207 lb 3.7 oz)   03/01/23 1404 94.3 kg (208 lb)   02/19/23 1429 91.6 kg (202 lb)   02/06/23 1325 92 kg (202 lb 12.8 oz)   02/01/23 1611 96.3 kg (212 lb 4.9 oz)   01/30/23 1429 98.3 kg (216 lb 11.4 oz)     Lab Results   Component Value Date    HGBA1C 7.4 (H) 04/19/2023     Lab Results   Component Value Date    CHOL 172 04/19/2023    HDL 44 04/19/2023    LDLCALC 106.6 04/19/2023    TRIG 107 04/19/2023    CHOLHDL 25.6 04/19/2023     Lab Results   Component Value Date     04/19/2023    K 4.3 04/19/2023     04/19/2023    CO2 29 04/19/2023     (H) 04/19/2023    BUN 23 04/19/2023    CREATININE 0.9 04/19/2023    CALCIUM 9.6 04/19/2023    PROT 7.2 03/23/2023    ALBUMIN 3.8 03/23/2023    BILITOT 0.4 03/23/2023    ALKPHOS 97 03/23/2023    AST 15 03/23/2023    ALT 23 03/23/2023    ANIONGAP 9 04/19/2023    ESTGFRAFRICA >60 07/21/2022    EGFRNONAA >60 07/21/2022    TSH 0.999 04/19/2023      Lab Results   Component Value Date    MICALBCREAT 7.6 04/19/2023     Assessment/Plan:     Type 2 diabetes mellitus with hyperglycemia, with long-term current use of insulin  Reviewed goals of therapy - to get the best control we can without hypoglycemia. Goal <7.5   - last a1c pretty much at goal  On pump review, overall sugar at goal    Not interested in additional copay with mounjaro    Unable to link to dexcom to clinic. Will get him connected to clinic.  Medication Changes   Continue below but will change to fiasp as his insurance now prefers Fiasp  INPEN with Fiasp cartridges   Average <100 units of novolog daily   I:C 1:5 and states he is sometimes changing 1:15 if lower carb  ISF 20  Max    with  Tresiba U200 30 units daily   Tresiba U100 60 units daily    Current regimen:  Tresiba 30 units  daily  Omnipod 5    Basal 2.4  Carb 5  ISF 20    Reviewed pump data and blood sugars are at goal.     Reviewed patient's current insulin regimen. Clarified proper insulin dose and timing in relation to meals, etc. Insulin injection sites and proper rotation instructed.   - reviewed dose adjustments as well.   -Advised frequent self blood glucose monitoring. Patient encouraged to document glucose results and bring them to every clinic visit    -Hypoglycemia precautions discussed. Instructed on precautions before driving.    -Close adherence to lifestyle changes recommended.    -Periodic follow ups for eye evaluations, foot care suggested.  Not on statin due to allergy - consider PCSK9 in future    Essential hypertension  bp controlled  continue regimen  F/u with PCP, monitor      Class 1 obesity due to excess calories with serious comorbidity and body mass index (BMI) of 34.0 to 34.9 in adult    BMI Readings from Last 1 Encounters:   04/26/23 29.89 kg/m²    complicated by HTN, HLD, diabetes   encourage pt to work on healthy diet, increase exercise as tolerated.        Hyperprolactinemia  Seen on labs in June 2022   Repeat prolactin    Vitamin D deficiency  Check Vitamin D   Low vitamin D in Oct 2022    Long term (current) use of systemic steroids  Osteopenia in 2020  Check DXA    Follow up in about 4 weeks (around 7/17/2023).

## 2023-06-19 ENCOUNTER — PATIENT MESSAGE (OUTPATIENT)
Dept: ENDOCRINOLOGY | Facility: CLINIC | Age: 68
End: 2023-06-19

## 2023-06-19 ENCOUNTER — OFFICE VISIT (OUTPATIENT)
Dept: ENDOCRINOLOGY | Facility: CLINIC | Age: 68
End: 2023-06-19
Payer: MEDICARE

## 2023-06-19 DIAGNOSIS — Z79.52 LONG TERM (CURRENT) USE OF SYSTEMIC STEROIDS: ICD-10-CM

## 2023-06-19 DIAGNOSIS — E66.09 CLASS 1 OBESITY DUE TO EXCESS CALORIES WITH SERIOUS COMORBIDITY AND BODY MASS INDEX (BMI) OF 34.0 TO 34.9 IN ADULT: ICD-10-CM

## 2023-06-19 DIAGNOSIS — D75.1 POLYCYTHEMIA, SECONDARY: ICD-10-CM

## 2023-06-19 DIAGNOSIS — I10 ESSENTIAL HYPERTENSION: ICD-10-CM

## 2023-06-19 DIAGNOSIS — E55.9 VITAMIN D DEFICIENCY: ICD-10-CM

## 2023-06-19 DIAGNOSIS — Z79.4 TYPE 2 DIABETES MELLITUS WITH HYPERGLYCEMIA, WITH LONG-TERM CURRENT USE OF INSULIN: ICD-10-CM

## 2023-06-19 DIAGNOSIS — E22.1 HYPERPROLACTINEMIA: Primary | ICD-10-CM

## 2023-06-19 DIAGNOSIS — E11.65 TYPE 2 DIABETES MELLITUS WITH HYPERGLYCEMIA, WITH LONG-TERM CURRENT USE OF INSULIN: ICD-10-CM

## 2023-06-19 PROCEDURE — 3051F PR MOST RECENT HEMOGLOBIN A1C LEVEL 7.0 - < 8.0%: ICD-10-PCS | Mod: CPTII,95,, | Performed by: NURSE PRACTITIONER

## 2023-06-19 PROCEDURE — 99215 PR OFFICE/OUTPT VISIT, EST, LEVL V, 40-54 MIN: ICD-10-PCS | Mod: 95,,, | Performed by: NURSE PRACTITIONER

## 2023-06-19 PROCEDURE — 1160F PR REVIEW ALL MEDS BY PRESCRIBER/CLIN PHARMACIST DOCUMENTED: ICD-10-PCS | Mod: CPTII,95,, | Performed by: NURSE PRACTITIONER

## 2023-06-19 PROCEDURE — 99215 OFFICE O/P EST HI 40 MIN: CPT | Mod: 95,,, | Performed by: NURSE PRACTITIONER

## 2023-06-19 PROCEDURE — 4010F ACE/ARB THERAPY RXD/TAKEN: CPT | Mod: CPTII,95,, | Performed by: NURSE PRACTITIONER

## 2023-06-19 PROCEDURE — 1160F RVW MEDS BY RX/DR IN RCRD: CPT | Mod: CPTII,95,, | Performed by: NURSE PRACTITIONER

## 2023-06-19 PROCEDURE — 3066F NEPHROPATHY DOC TX: CPT | Mod: CPTII,95,, | Performed by: NURSE PRACTITIONER

## 2023-06-19 PROCEDURE — 3066F PR DOCUMENTATION OF TREATMENT FOR NEPHROPATHY: ICD-10-PCS | Mod: CPTII,95,, | Performed by: NURSE PRACTITIONER

## 2023-06-19 PROCEDURE — 1159F MED LIST DOCD IN RCRD: CPT | Mod: CPTII,95,, | Performed by: NURSE PRACTITIONER

## 2023-06-19 PROCEDURE — 3061F PR NEG MICROALBUMINURIA RESULT DOCUMENTED/REVIEW: ICD-10-PCS | Mod: CPTII,95,, | Performed by: NURSE PRACTITIONER

## 2023-06-19 PROCEDURE — 4010F PR ACE/ARB THEARPY RXD/TAKEN: ICD-10-PCS | Mod: CPTII,95,, | Performed by: NURSE PRACTITIONER

## 2023-06-19 PROCEDURE — 1159F PR MEDICATION LIST DOCUMENTED IN MEDICAL RECORD: ICD-10-PCS | Mod: CPTII,95,, | Performed by: NURSE PRACTITIONER

## 2023-06-19 PROCEDURE — 3061F NEG MICROALBUMINURIA REV: CPT | Mod: CPTII,95,, | Performed by: NURSE PRACTITIONER

## 2023-06-19 PROCEDURE — 3051F HG A1C>EQUAL 7.0%<8.0%: CPT | Mod: CPTII,95,, | Performed by: NURSE PRACTITIONER

## 2023-06-19 RX ORDER — INSULIN ASPART INJECTION 100 [IU]/ML
INJECTION, SOLUTION SUBCUTANEOUS
Qty: 30 ML | Refills: 3 | Status: SHIPPED | OUTPATIENT
Start: 2023-06-19 | End: 2023-06-22

## 2023-06-19 RX ORDER — INSULIN ASPART INJECTION 100 [IU]/ML
INJECTION, SOLUTION SUBCUTANEOUS
Qty: 15 PEN | Refills: 3 | Status: SHIPPED | OUTPATIENT
Start: 2023-06-19 | End: 2023-06-22

## 2023-06-19 NOTE — ASSESSMENT & PLAN NOTE
BMI Readings from Last 1 Encounters:   04/26/23 29.89 kg/m²    complicated by HTN, HLD, diabetes   encourage pt to work on healthy diet, increase exercise as tolerated.

## 2023-06-19 NOTE — PATIENT INSTRUCTIONS
Osteopenia    Check bone density   Check vitamin D     Cholesterol    LDL goal less than 70   Consider Praulent in the future if needed     High Prolactin level    Check prolactin level     Diabetes  Check labs  Unable to link to dexcom to clinic. Will get him connected to clinic.    If insulin not covered, can switch to Novolin R $24 a vial at [facility] without prescription    Continue below but will change to fiasp as his insurance now prefers Fiasp  INPEN with Fiasp cartridges   Average <100 units of novolog daily   I:C 1:5 and states he is sometimes changing 1:15 if lower carb  ISF 20  Max    with  Tresiba U200 30 units daily   Tresiba U100 60 units daily    Current regimen:  Tresiba 30 units daily  Omnipod 5    Basal 2.4  Carb 5  ISF 20    Reviewed pump data and blood sugars are at goal.

## 2023-06-20 ENCOUNTER — PATIENT MESSAGE (OUTPATIENT)
Dept: ENDOCRINOLOGY | Facility: CLINIC | Age: 68
End: 2023-06-20
Payer: MEDICARE

## 2023-06-21 ENCOUNTER — PATIENT MESSAGE (OUTPATIENT)
Dept: ENDOCRINOLOGY | Facility: CLINIC | Age: 68
End: 2023-06-21
Payer: MEDICARE

## 2023-06-22 ENCOUNTER — OFFICE VISIT (OUTPATIENT)
Dept: CARDIOLOGY | Facility: CLINIC | Age: 68
End: 2023-06-22
Payer: MEDICARE

## 2023-06-22 VITALS
SYSTOLIC BLOOD PRESSURE: 126 MMHG | DIASTOLIC BLOOD PRESSURE: 70 MMHG | WEIGHT: 217 LBS | HEART RATE: 86 BPM | BODY MASS INDEX: 31.14 KG/M2 | OXYGEN SATURATION: 97 %

## 2023-06-22 DIAGNOSIS — E66.01 SEVERE OBESITY: ICD-10-CM

## 2023-06-22 DIAGNOSIS — E11.65 TYPE 2 DIABETES MELLITUS WITH HYPERGLYCEMIA, WITH LONG-TERM CURRENT USE OF INSULIN: Primary | ICD-10-CM

## 2023-06-22 DIAGNOSIS — I70.209 ATHEROSCLEROTIC PERIPHERAL VASCULAR DISEASE: ICD-10-CM

## 2023-06-22 DIAGNOSIS — Z79.4 TYPE 2 DIABETES MELLITUS WITH HYPERGLYCEMIA, WITH LONG-TERM CURRENT USE OF INSULIN: Primary | ICD-10-CM

## 2023-06-22 DIAGNOSIS — I47.10 SUPRAVENTRICULAR TACHYCARDIA: ICD-10-CM

## 2023-06-22 DIAGNOSIS — I10 PRIMARY HYPERTENSION: ICD-10-CM

## 2023-06-22 DIAGNOSIS — I50.32 CHRONIC DIASTOLIC HEART FAILURE: ICD-10-CM

## 2023-06-22 DIAGNOSIS — I25.10 ATHEROSCLEROSIS OF NATIVE CORONARY ARTERY OF NATIVE HEART WITHOUT ANGINA PECTORIS: Primary | ICD-10-CM

## 2023-06-22 PROCEDURE — 3288F FALL RISK ASSESSMENT DOCD: CPT | Mod: CPTII,S$GLB,, | Performed by: INTERNAL MEDICINE

## 2023-06-22 PROCEDURE — 1101F PR PT FALLS ASSESS DOC 0-1 FALLS W/OUT INJ PAST YR: ICD-10-PCS | Mod: CPTII,S$GLB,, | Performed by: INTERNAL MEDICINE

## 2023-06-22 PROCEDURE — 4010F ACE/ARB THERAPY RXD/TAKEN: CPT | Mod: CPTII,S$GLB,, | Performed by: INTERNAL MEDICINE

## 2023-06-22 PROCEDURE — 1101F PT FALLS ASSESS-DOCD LE1/YR: CPT | Mod: CPTII,S$GLB,, | Performed by: INTERNAL MEDICINE

## 2023-06-22 PROCEDURE — 1160F RVW MEDS BY RX/DR IN RCRD: CPT | Mod: CPTII,S$GLB,, | Performed by: INTERNAL MEDICINE

## 2023-06-22 PROCEDURE — 99214 OFFICE O/P EST MOD 30 MIN: CPT | Mod: S$GLB,,, | Performed by: INTERNAL MEDICINE

## 2023-06-22 PROCEDURE — 3078F PR MOST RECENT DIASTOLIC BLOOD PRESSURE < 80 MM HG: ICD-10-PCS | Mod: CPTII,S$GLB,, | Performed by: INTERNAL MEDICINE

## 2023-06-22 PROCEDURE — 3074F SYST BP LT 130 MM HG: CPT | Mod: CPTII,S$GLB,, | Performed by: INTERNAL MEDICINE

## 2023-06-22 PROCEDURE — 3066F PR DOCUMENTATION OF TREATMENT FOR NEPHROPATHY: ICD-10-PCS | Mod: CPTII,S$GLB,, | Performed by: INTERNAL MEDICINE

## 2023-06-22 PROCEDURE — 3051F PR MOST RECENT HEMOGLOBIN A1C LEVEL 7.0 - < 8.0%: ICD-10-PCS | Mod: CPTII,S$GLB,, | Performed by: INTERNAL MEDICINE

## 2023-06-22 PROCEDURE — 99214 PR OFFICE/OUTPT VISIT, EST, LEVL IV, 30-39 MIN: ICD-10-PCS | Mod: S$GLB,,, | Performed by: INTERNAL MEDICINE

## 2023-06-22 PROCEDURE — 3008F PR BODY MASS INDEX (BMI) DOCUMENTED: ICD-10-PCS | Mod: CPTII,S$GLB,, | Performed by: INTERNAL MEDICINE

## 2023-06-22 PROCEDURE — 99999 PR PBB SHADOW E&M-EST. PATIENT-LVL IV: CPT | Mod: PBBFAC,,, | Performed by: INTERNAL MEDICINE

## 2023-06-22 PROCEDURE — 3061F PR NEG MICROALBUMINURIA RESULT DOCUMENTED/REVIEW: ICD-10-PCS | Mod: CPTII,S$GLB,, | Performed by: INTERNAL MEDICINE

## 2023-06-22 PROCEDURE — 3288F PR FALLS RISK ASSESSMENT DOCUMENTED: ICD-10-PCS | Mod: CPTII,S$GLB,, | Performed by: INTERNAL MEDICINE

## 2023-06-22 PROCEDURE — 3008F BODY MASS INDEX DOCD: CPT | Mod: CPTII,S$GLB,, | Performed by: INTERNAL MEDICINE

## 2023-06-22 PROCEDURE — 1159F PR MEDICATION LIST DOCUMENTED IN MEDICAL RECORD: ICD-10-PCS | Mod: CPTII,S$GLB,, | Performed by: INTERNAL MEDICINE

## 2023-06-22 PROCEDURE — 1126F AMNT PAIN NOTED NONE PRSNT: CPT | Mod: CPTII,S$GLB,, | Performed by: INTERNAL MEDICINE

## 2023-06-22 PROCEDURE — 1126F PR PAIN SEVERITY QUANTIFIED, NO PAIN PRESENT: ICD-10-PCS | Mod: CPTII,S$GLB,, | Performed by: INTERNAL MEDICINE

## 2023-06-22 PROCEDURE — 99999 PR PBB SHADOW E&M-EST. PATIENT-LVL IV: ICD-10-PCS | Mod: PBBFAC,,, | Performed by: INTERNAL MEDICINE

## 2023-06-22 PROCEDURE — 1159F MED LIST DOCD IN RCRD: CPT | Mod: CPTII,S$GLB,, | Performed by: INTERNAL MEDICINE

## 2023-06-22 PROCEDURE — 1160F PR REVIEW ALL MEDS BY PRESCRIBER/CLIN PHARMACIST DOCUMENTED: ICD-10-PCS | Mod: CPTII,S$GLB,, | Performed by: INTERNAL MEDICINE

## 2023-06-22 PROCEDURE — 3051F HG A1C>EQUAL 7.0%<8.0%: CPT | Mod: CPTII,S$GLB,, | Performed by: INTERNAL MEDICINE

## 2023-06-22 PROCEDURE — 3074F PR MOST RECENT SYSTOLIC BLOOD PRESSURE < 130 MM HG: ICD-10-PCS | Mod: CPTII,S$GLB,, | Performed by: INTERNAL MEDICINE

## 2023-06-22 PROCEDURE — 4010F PR ACE/ARB THEARPY RXD/TAKEN: ICD-10-PCS | Mod: CPTII,S$GLB,, | Performed by: INTERNAL MEDICINE

## 2023-06-22 PROCEDURE — 3066F NEPHROPATHY DOC TX: CPT | Mod: CPTII,S$GLB,, | Performed by: INTERNAL MEDICINE

## 2023-06-22 PROCEDURE — 3061F NEG MICROALBUMINURIA REV: CPT | Mod: CPTII,S$GLB,, | Performed by: INTERNAL MEDICINE

## 2023-06-22 PROCEDURE — 3078F DIAST BP <80 MM HG: CPT | Mod: CPTII,S$GLB,, | Performed by: INTERNAL MEDICINE

## 2023-06-22 RX ORDER — INSULIN ASPART 100 [IU]/ML
INJECTION, SOLUTION INTRAVENOUS; SUBCUTANEOUS
Qty: 15 ML | Refills: 3 | Status: SHIPPED | OUTPATIENT
Start: 2023-06-22 | End: 2023-08-23 | Stop reason: SDUPTHER

## 2023-06-22 NOTE — PROGRESS NOTES
[facility]    Chief Complaint  Chief Complaint   Patient presents with    Coronary Artery Disease       HPI:    He has been doing well.  Reports improving diabetes control.  Back on prednisone.  Not having to take any extra diuretics.  No palpitations or other symptoms of his SVT.    Medications  Current Outpatient Medications   Medication Sig Dispense Refill    aspirin 81 MG Chew Take 81 mg by mouth.      digoxin (LANOXIN) 125 mcg tablet Take 1 tablet (125 mcg total) by mouth once daily. 90 tablet 3    ergocalciferol (ERGOCALCIFEROL) 50,000 unit Cap Take 1 capsule (50,000 Units total) by mouth every 7 days. 12 capsule 3    folic acid (FOLVITE) 1 MG tablet Take 1 tablet (1 mg total) by mouth once daily. 30 tablet 5    furosemide (LASIX) 40 MG tablet Take 1 tablet (40 mg total) by mouth daily as needed (fluid). 90 tablet 3    insulin aspart, niacinamide, (FIASP PENFILL U-100 INSULIN) 100 unit/mL (3 mL) Crtg pen  units; to use with INPEN 15 pen 3    insulin aspart, niacinamide, (FIASP U-100 INSULIN) 100 unit/mL Soln  units 30 mL 3    insulin pump cart,automated,BT (OMNIPOD 5 G6 PODS, GEN 5,) Crtg Inject 1 patch into the skin every 48 hours. 15 each 11    lamiVUDine (EPIVIR) 150 MG Tab TAKE 1 TABLET BY MOUTH EVERY DAY 30 tablet 23    multivitamin (THERAGRAN) per tablet Take 1 tablet by mouth once daily.      mupirocin (BACTROBAN) 2 % ointment Apply to affected area 3 times daily 22 g 1    nitroGLYCERIN (NITROSTAT) 0.4 MG SL tablet Place 1 tablet (0.4 mg total) under the tongue every 5 (five) minutes as needed for Chest pain. 25 tablet 11    olmesartan (BENICAR) 40 MG tablet TAKE 1 TABLET BY MOUTH EVERY DAY AT NIGHT 90 tablet 3    ondansetron (ZOFRAN) 4 MG tablet Take 1 tablet (4 mg total) by mouth every 8 (eight) hours as needed for Nausea. 60 tablet 6    predniSONE (DELTASONE) 10 MG tablet Take 1 tablet (10 mg total) by mouth once daily. 90 tablet 4    predniSONE (DELTASONE) 5 MG tablet  Take 1 tablet (5 mg total) by mouth once daily. 90 tablet 3    PROAIR HFA 90 mcg/actuation inhaler       spironolactone (ALDACTONE) 25 MG tablet Take 1 tablet (25 mg total) by mouth once daily. 90 tablet 3    nystatin (MYCOSTATIN) powder Apply topically 4 (four) times daily. Apply to intertriginous areas as directed up to four times daily to reduce moisture. for 14 days 30 g 0     No current facility-administered medications for this visit.        History  Past Medical History:   Diagnosis Date    Arthritis     Atrial myxoma     CHF (congestive heart failure)     Coronary atherosclerosis     Diabetes mellitus, type 2     Difficult intubation     Hepatitis B     Hyperlipidemia     Hypertension     Non-alcoholic fatty liver disease     Rheumatoid arthritis     Rheumatoid arthritis flare 07/12/2021    Stroke     TIA    Systolic heart failure      Past Surgical History:   Procedure Laterality Date    CORONARY ANGIOGRAPHY N/A 3/10/2021    Procedure: ANGIOGRAM, CORONARY ARTERY - right radial;  Surgeon: Shemar Dempsey MD;  Location: [facility] CATH LAB;  Service: Cardiology;  Laterality: N/A;    CORONARY STENT PLACEMENT  03/10/2021    prox-mid RCA Marshal 4.5 x 26 mm, 4.5 x 12 mm    INCISION AND DRAINAGE OF SCROTUM N/A 11/15/2022    Procedure: INCISION AND DRAINAGE, SCROTUM;  Surgeon: William Hatch MD;  Location: [facility] OR;  Service: Urology;  Laterality: N/A;    INCISION AND DRAINAGE OF SCROTUM N/A 11/17/2022    Procedure: INCISION AND DRAINAGE, SCROTUM;  Surgeon: William Hatch MD;  Location: [facility] OR;  Service: Urology;  Laterality: N/A;    LUNG LOBECTOMY Right 2008    RUL lobectomy after removal of atrial myxoma    PLEURA BIOPSY      RESECTION OF ATRIAL MYXOMA  2007     Social History     Socioeconomic History    Marital status: Single   Occupational History    Occupation: , respiratory therapist, founded [employer]     Comment: Retired   Tobacco Use    Smoking status: Every Day     Packs/day: 1.00      Years: 35.00     Pack years: 35.00     Types: Cigarettes    Smokeless tobacco: Never   Substance and Sexual Activity    Alcohol use: Not Currently    Drug use: No    Sexual activity: Never     Social Determinants of Health     Financial Resource Strain: High Risk    Difficulty of Paying Living Expenses: Hard   Food Insecurity: No Food Insecurity    Worried About Running Out of Food in the Last Year: Never true    Ran Out of Food in the Last Year: Never true   Transportation Needs: Unmet Transportation Needs    Lack of Transportation (Medical): Yes    Lack of Transportation (Non-Medical): Yes   Physical Activity: Inactive    Days of Exercise per Week: 0 days    Minutes of Exercise per Session: 0 min   Stress: Stress Concern Present    Feeling of Stress : To some extent   Social Connections: Socially Isolated    Frequency of Communication with Friends and Family: More than three times a week    Frequency of Social Gatherings with Friends and Family: More than three times a week    Attends Jewish Services: Never    Active Member of Clubs or Organizations: No    Attends Club or Organization Meetings: Never    Marital Status: Never    Housing Stability: High Risk    Unable to Pay for Housing in the Last Year: Yes    Number of Places Lived in the Last Year: 1    Unstable Housing in the Last Year: No     Family History   Problem Relation Age of Onset    Hodgkin's lymphoma Mother     Diabetes type II Mother     Kidney failure Father     Diabetes type I Father     Cancer Sister         Allergies  Review of patient's allergies indicates:   Allergen Reactions    Enbrel [etanercept] Shortness Of Breath     CHF    Nsaids (non-steroidal anti-inflammatory drug) Other (See Comments)     hypertention  Other reaction(s): Other (See Comments)  hypertention  HTN    Statins-hmg-coa reductase inhibitors Other (See Comments)     Heart arrythemias  Other reaction(s): Other (See Comments)  Heart arrythemias  Joint pain and cardiac  arrythmias    Pcn [penicillins] Rash       Review of Systems   Review of Systems   Constitutional: Negative for malaise/fatigue, weight gain and weight loss.   Eyes:  Negative for visual disturbance.   Cardiovascular:  Negative for chest pain, claudication, cyanosis, dyspnea on exertion, irregular heartbeat, leg swelling, near-syncope, orthopnea, palpitations, paroxysmal nocturnal dyspnea and syncope.   Respiratory:  Negative for hemoptysis, shortness of breath, sleep disturbances due to breathing and wheezing.    Hematologic/Lymphatic: Negative for bleeding problem. Does not bruise/bleed easily.   Skin:  Negative for poor wound healing.   Musculoskeletal:  Negative for muscle cramps and myalgias.   Gastrointestinal:  Negative for abdominal pain, anorexia, diarrhea, heartburn, hematemesis, hematochezia, melena, nausea and vomiting.   Genitourinary:  Negative for hematuria and nocturia.   Neurological:  Negative for excessive daytime sleepiness, dizziness, focal weakness, light-headedness and weakness.     Physical Exam  Vitals:    06/22/23 1422   BP: 126/70   Pulse: 86     Wt Readings from Last 1 Encounters:   06/22/23 98.4 kg (217 lb)     Physical Exam  Constitutional:       General: He is not in acute distress.     Appearance: He is obese. He is not toxic-appearing or diaphoretic.   HENT:      Head: Normocephalic and atraumatic.   Eyes:      General: No scleral icterus.     Conjunctiva/sclera: Conjunctivae normal.   Neck:      Thyroid: No thyromegaly.      Vascular: No carotid bruit, hepatojugular reflux or JVD.   Cardiovascular:      Rate and Rhythm: Normal rate and regular rhythm. No extrasystoles are present.     Chest Wall: PMI is not displaced.      Pulses:           Carotid pulses are 2+ on the right side and 2+ on the left side.       Radial pulses are 2+ on the right side and 2+ on the left side.        Dorsalis pedis pulses are 2+ on the right side and 2+ on the left side.        Posterior tibial pulses  are 2+ on the right side and 2+ on the left side.      Heart sounds: S1 normal and S2 normal. No murmur heard.    Gallop present. S4 sounds present. No S3 sounds.   Pulmonary:      Effort: No accessory muscle usage or respiratory distress.      Breath sounds: No decreased breath sounds, wheezing, rhonchi or rales.   Abdominal:      General: Bowel sounds are normal. There is no abdominal bruit.      Palpations: Abdomen is soft. There is no hepatomegaly, splenomegaly or pulsatile mass.      Tenderness: There is no abdominal tenderness.   Musculoskeletal:         General: No tenderness or deformity.      Right lower leg: No edema.      Left lower leg: No edema.   Skin:     General: Skin is warm and dry.      Coloration: Skin is not pale.      Nails: There is no clubbing.   Neurological:      General: No focal deficit present.      Mental Status: He is alert and oriented to person, place, and time.      Sensory: Sensation is intact.      Motor: Motor function is intact.   Psychiatric:         Speech: Speech normal.         Behavior: Behavior normal. Behavior is cooperative.       Labs  Lab Visit on 04/19/2023   Component Date Value Ref Range Status    Sodium 04/19/2023 141  136 - 145 mmol/L Final    Potassium 04/19/2023 4.3  3.5 - 5.1 mmol/L Final    Chloride 04/19/2023 103  95 - 110 mmol/L Final    CO2 04/19/2023 29  23 - 29 mmol/L Final    Glucose 04/19/2023 159 (H)  70 - 110 mg/dL Final    BUN 04/19/2023 23  8 - 23 mg/dL Final    Creatinine 04/19/2023 0.9  0.5 - 1.4 mg/dL Final    Calcium 04/19/2023 9.6  8.7 - 10.5 mg/dL Final    Anion Gap 04/19/2023 9  8 - 16 mmol/L Final    eGFR 04/19/2023 >60  >60 mL/min/1.73 m^2 Final    Hemoglobin A1C 04/19/2023 7.4 (H)  4.0 - 5.6 % Final    Comment: ADA Screening Guidelines:  5.7-6.4%  Consistent with prediabetes  >or=6.5%  Consistent with diabetes    High levels of fetal hemoglobin interfere with the HbA1C  assay. Heterozygous hemoglobin variants (HbS, HgC, etc)do  not  significantly interfere with this assay.   However, presence of multiple variants may affect accuracy.      Estimated Avg Glucose 04/19/2023 166 (H)  68 - 131 mg/dL Final    Cholesterol 04/19/2023 172  120 - 199 mg/dL Final    Comment: The National Cholesterol Education Program (NCEP) has set the  following guidelines (reference ranges) for Cholesterol:  Optimal.....................<200 mg/dL  Borderline High.............200-239 mg/dL  High........................> or = 240 mg/dL      Triglycerides 04/19/2023 107  30 - 150 mg/dL Final    Comment: The National Cholesterol Education Program (NCEP) has set the  following guidelines (reference values) for triglycerides:  Normal......................<150 mg/dL  Borderline High.............150-199 mg/dL  High........................200-499 mg/dL      HDL 04/19/2023 44  40 - 75 mg/dL Final    Comment: The National Cholesterol Education Program (NCEP) has set the  following guidelines (reference values) for HDL Cholesterol:  Low...............<40 mg/dL  Optimal...........>60 mg/dL      LDL Cholesterol 04/19/2023 106.6  63.0 - 159.0 mg/dL Final    Comment: The National Cholesterol Education Program (NCEP) has set the  following guidelines (reference values) for LDL Cholesterol:  Optimal.......................<130 mg/dL  Borderline High...............130-159 mg/dL  High..........................160-189 mg/dL  Very High.....................>190 mg/dL      HDL/Cholesterol Ratio 04/19/2023 25.6  20.0 - 50.0 % Final    Total Cholesterol/HDL Ratio 04/19/2023 3.9  2.0 - 5.0 Final    Non-HDL Cholesterol 04/19/2023 128  mg/dL Final    Comment: Risk category and Non-HDL cholesterol goals:  Coronary heart disease (CHD)or equivalent (10-year risk of CHD >20%):  Non-HDL cholesterol goal     <130 mg/dL  Two or more CHD risk factors and 10-year risk of CHD <= 20%:  Non-HDL cholesterol goal     <160 mg/dL  0 to 1 CHD risk factor:  Non-HDL cholesterol goal     <190 mg/dL      TSH 04/19/2023  0.999  0.400 - 4.000 uIU/mL Final    Free T4 04/19/2023 1.05  0.71 - 1.51 ng/dL Final   Lab Visit on 04/19/2023   Component Date Value Ref Range Status    Microalbumin, Urine 04/19/2023 6.0  ug/mL Final    Creatinine, Urine 04/19/2023 78.6  23.0 - 375.0 mg/dL Final    Microalb/Creat Ratio 04/19/2023 7.6  0.0 - 30.0 ug/mg Final   Lab Visit on 03/23/2023   Component Date Value Ref Range Status    WBC 03/23/2023 9.32  3.90 - 12.70 K/uL Final    RBC 03/23/2023 5.17  4.60 - 6.20 M/uL Final    Hemoglobin 03/23/2023 16.4  14.0 - 18.0 g/dL Final    Hematocrit 03/23/2023 50.2  40.0 - 54.0 % Final    MCV 03/23/2023 97  82 - 98 fL Final    MCH 03/23/2023 31.7 (H)  27.0 - 31.0 pg Final    MCHC 03/23/2023 32.7  32.0 - 36.0 g/dL Final    RDW 03/23/2023 15.2 (H)  11.5 - 14.5 % Final    Platelets 03/23/2023 232  150 - 450 K/uL Final    MPV 03/23/2023 11.6  9.2 - 12.9 fL Final    Immature Granulocytes 03/23/2023 0.9 (H)  0.0 - 0.5 % Final    Gran # (ANC) 03/23/2023 5.7  1.8 - 7.7 K/uL Final    Immature Grans (Abs) 03/23/2023 0.08 (H)  0.00 - 0.04 K/uL Final    Comment: Mild elevation in immature granulocytes is non specific and   can be seen in a variety of conditions including stress response,   acute inflammation, trauma and pregnancy. Correlation with other   laboratory and clinical findings is essential.      Lymph # 03/23/2023 2.6  1.0 - 4.8 K/uL Final    Mono # 03/23/2023 0.7  0.3 - 1.0 K/uL Final    Eos # 03/23/2023 0.2  0.0 - 0.5 K/uL Final    Baso # 03/23/2023 0.06  0.00 - 0.20 K/uL Final    nRBC 03/23/2023 0  0 /100 WBC Final    Gran % 03/23/2023 60.9  38.0 - 73.0 % Final    Lymph % 03/23/2023 27.6  18.0 - 48.0 % Final    Mono % 03/23/2023 7.9  4.0 - 15.0 % Final    Eosinophil % 03/23/2023 2.1  0.0 - 8.0 % Final    Basophil % 03/23/2023 0.6  0.0 - 1.9 % Final    Differential Method 03/23/2023 Automated   Final    Cholesterol 03/23/2023 183  120 - 199 mg/dL Final    Comment: The National Cholesterol Education Program (NCEP)  has set the  following guidelines (reference ranges) for Cholesterol:  Optimal.....................<200 mg/dL  Borderline High.............200-239 mg/dL  High........................> or = 240 mg/dL      Triglycerides 03/23/2023 136  30 - 150 mg/dL Final    Comment: The National Cholesterol Education Program (NCEP) has set the  following guidelines (reference values) for triglycerides:  Normal......................<150 mg/dL  Borderline High.............150-199 mg/dL  High........................200-499 mg/dL      HDL 03/23/2023 47  40 - 75 mg/dL Final    Comment: The National Cholesterol Education Program (NCEP) has set the  following guidelines (reference values) for HDL Cholesterol:  Low...............<40 mg/dL  Optimal...........>60 mg/dL      LDL Cholesterol 03/23/2023 108.8  63.0 - 159.0 mg/dL Final    Comment: The National Cholesterol Education Program (NCEP) has set the  following guidelines (reference values) for LDL Cholesterol:  Optimal.......................<130 mg/dL  Borderline High...............130-159 mg/dL  High..........................160-189 mg/dL  Very High.....................>190 mg/dL      HDL/Cholesterol Ratio 03/23/2023 25.7  20.0 - 50.0 % Final    Total Cholesterol/HDL Ratio 03/23/2023 3.9  2.0 - 5.0 Final    Non-HDL Cholesterol 03/23/2023 136  mg/dL Final    Comment: Risk category and Non-HDL cholesterol goals:  Coronary heart disease (CHD)or equivalent (10-year risk of CHD >20%):  Non-HDL cholesterol goal     <130 mg/dL  Two or more CHD risk factors and 10-year risk of CHD <= 20%:  Non-HDL cholesterol goal     <160 mg/dL  0 to 1 CHD risk factor:  Non-HDL cholesterol goal     <190 mg/dL      Sodium 03/23/2023 137  136 - 145 mmol/L Final    Potassium 03/23/2023 4.3  3.5 - 5.1 mmol/L Final    Chloride 03/23/2023 101  95 - 110 mmol/L Final    CO2 03/23/2023 28  23 - 29 mmol/L Final    Glucose 03/23/2023 199 (H)  70 - 110 mg/dL Final    BUN 03/23/2023 19  8 - 23 mg/dL Final    Creatinine  03/23/2023 1.0  0.5 - 1.4 mg/dL Final    Calcium 03/23/2023 9.4  8.7 - 10.5 mg/dL Final    Total Protein 03/23/2023 7.2  6.0 - 8.4 g/dL Final    Albumin 03/23/2023 3.8  3.5 - 5.2 g/dL Final    Total Bilirubin 03/23/2023 0.4  0.1 - 1.0 mg/dL Final    Comment: For infants and newborns, interpretation of results should be based  on gestational age, weight and in agreement with clinical  observations.    Premature Infant recommended reference ranges:  Up to 24 hours.............<8.0 mg/dL  Up to 48 hours............<12.0 mg/dL  3-5 days..................<15.0 mg/dL  6-29 days.................<15.0 mg/dL      Alkaline Phosphatase 03/23/2023 97  55 - 135 U/L Final    AST 03/23/2023 15  10 - 40 U/L Final    ALT 03/23/2023 23  10 - 44 U/L Final    Anion Gap 03/23/2023 8  8 - 16 mmol/L Final    eGFR 03/23/2023 >60  >60 mL/min/1.73 m^2 Final    Magnesium 03/23/2023 2.2  1.6 - 2.6 mg/dL Final    Hemoglobin A1C 03/23/2023 7.5 (H)  4.0 - 5.6 % Final    Comment: ADA Screening Guidelines:  5.7-6.4%  Consistent with prediabetes  >or=6.5%  Consistent with diabetes    High levels of fetal hemoglobin interfere with the HbA1C  assay. Heterozygous hemoglobin variants (HbS, HgC, etc)do  not significantly interfere with this assay.   However, presence of multiple variants may affect accuracy.      Estimated Avg Glucose 03/23/2023 169 (H)  68 - 131 mg/dL Final   Admission on 01/26/2023, Discharged on 01/29/2023   Component Date Value Ref Range Status    WBC 01/26/2023 8.00  3.90 - 12.70 K/uL Final    RBC 01/26/2023 4.04 (L)  4.60 - 6.20 M/uL Final    Hemoglobin 01/26/2023 12.8 (L)  14.0 - 18.0 g/dL Final    Hematocrit 01/26/2023 38.4 (L)  40.0 - 54.0 % Final    MCV 01/26/2023 95  82 - 98 fL Final    MCH 01/26/2023 31.7 (H)  27.0 - 31.0 pg Final    MCHC 01/26/2023 33.3  32.0 - 36.0 g/dL Final    RDW 01/26/2023 11.9  11.5 - 14.5 % Final    Platelets 01/26/2023 290  150 - 450 K/uL Final    MPV 01/26/2023 10.1  9.2 - 12.9 fL Final    Immature  Granulocytes 01/26/2023 0.4  0.0 - 0.5 % Final    Gran # (ANC) 01/26/2023 5.9  1.8 - 7.7 K/uL Final    Immature Grans (Abs) 01/26/2023 0.03  0.00 - 0.04 K/uL Final    Comment: Mild elevation in immature granulocytes is non specific and   can be seen in a variety of conditions including stress response,   acute inflammation, trauma and pregnancy. Correlation with other   laboratory and clinical findings is essential.      Lymph # 01/26/2023 1.4  1.0 - 4.8 K/uL Final    Mono # 01/26/2023 0.6  0.3 - 1.0 K/uL Final    Eos # 01/26/2023 0.1  0.0 - 0.5 K/uL Final    Baso # 01/26/2023 0.05  0.00 - 0.20 K/uL Final    nRBC 01/26/2023 0  0 /100 WBC Final    Gran % 01/26/2023 73.1 (H)  38.0 - 73.0 % Final    Lymph % 01/26/2023 17.1 (L)  18.0 - 48.0 % Final    Mono % 01/26/2023 8.0  4.0 - 15.0 % Final    Eosinophil % 01/26/2023 0.8  0.0 - 8.0 % Final    Basophil % 01/26/2023 0.6  0.0 - 1.9 % Final    Differential Method 01/26/2023 Automated   Final    Sodium 01/26/2023 133 (L)  136 - 145 mmol/L Final    Potassium 01/26/2023 4.8  3.5 - 5.1 mmol/L Final    Chloride 01/26/2023 97  95 - 110 mmol/L Final    CO2 01/26/2023 25  23 - 29 mmol/L Final    Glucose 01/26/2023 229 (H)  70 - 110 mg/dL Final    BUN 01/26/2023 9  8 - 23 mg/dL Final    Creatinine 01/26/2023 0.8  0.5 - 1.4 mg/dL Final    Calcium 01/26/2023 9.3  8.7 - 10.5 mg/dL Final    Total Protein 01/26/2023 7.0  6.0 - 8.4 g/dL Final    Albumin 01/26/2023 2.8 (L)  3.5 - 5.2 g/dL Final    Total Bilirubin 01/26/2023 0.4  0.1 - 1.0 mg/dL Final    Comment: For infants and newborns, interpretation of results should be based  on gestational age, weight and in agreement with clinical  observations.    Premature Infant recommended reference ranges:  Up to 24 hours.............<8.0 mg/dL  Up to 48 hours............<12.0 mg/dL  3-5 days..................<15.0 mg/dL  6-29 days.................<15.0 mg/dL      Alkaline Phosphatase 01/26/2023 90  55 - 135 U/L Final    AST 01/26/2023 15  10  - 40 U/L Final    ALT 01/26/2023 16  10 - 44 U/L Final    Anion Gap 01/26/2023 11  8 - 16 mmol/L Final    eGFR 01/26/2023 >60  >60 mL/min/1.73 m^2 Final    Specimen UA 01/26/2023 Urine, Clean Catch   Final    Color, UA 01/26/2023 Yellow  Yellow, Straw, Trice Final    Appearance, UA 01/26/2023 Clear  Clear Final    pH, UA 01/26/2023 7.0  5.0 - 8.0 Final    Specific Gravity, UA 01/26/2023 1.030  1.005 - 1.030 Final    Protein, UA 01/26/2023 Negative  Negative Final    Comment: Recommend a 24 hour urine protein or a urine   protein/creatinine ratio if globulin induced proteinuria is  clinically suspected.      Glucose, UA 01/26/2023 Negative  Negative Final    Ketones, UA 01/26/2023 2+ (A)  Negative Final    Bilirubin (UA) 01/26/2023 Negative  Negative Final    Occult Blood UA 01/26/2023 Negative  Negative Final    Nitrite, UA 01/26/2023 Negative  Negative Final    Urobilinogen, UA 01/26/2023 Negative  <2.0 EU/dL Final    Leukocytes, UA 01/26/2023 Negative  Negative Final    Blood Culture, Routine 01/26/2023 No growth after 5 days.   Final    Blood Culture, Routine 01/26/2023 No growth after 5 days.   Final    Sodium 01/27/2023 134 (L)  136 - 145 mmol/L Final    Potassium 01/27/2023 4.0  3.5 - 5.1 mmol/L Final    Chloride 01/27/2023 98  95 - 110 mmol/L Final    CO2 01/27/2023 27  23 - 29 mmol/L Final    Glucose 01/27/2023 178 (H)  70 - 110 mg/dL Final    BUN 01/27/2023 8  8 - 23 mg/dL Final    Creatinine 01/27/2023 0.7  0.5 - 1.4 mg/dL Final    Calcium 01/27/2023 9.0  8.7 - 10.5 mg/dL Final    Anion Gap 01/27/2023 9  8 - 16 mmol/L Final    eGFR 01/27/2023 >60  >60 mL/min/1.73 m^2 Final    Magnesium 01/27/2023 2.0  1.6 - 2.6 mg/dL Final    WBC 01/27/2023 7.61  3.90 - 12.70 K/uL Final    RBC 01/27/2023 3.76 (L)  4.60 - 6.20 M/uL Final    Hemoglobin 01/27/2023 11.9 (L)  14.0 - 18.0 g/dL Final    Hematocrit 01/27/2023 35.6 (L)  40.0 - 54.0 % Final    MCV 01/27/2023 95  82 - 98 fL Final    MCH 01/27/2023 31.6 (H)  27.0 -  31.0 pg Final    MCHC 01/27/2023 33.4  32.0 - 36.0 g/dL Final    RDW 01/27/2023 11.9  11.5 - 14.5 % Final    Platelets 01/27/2023 274  150 - 450 K/uL Final    MPV 01/27/2023 11.3  9.2 - 12.9 fL Final    Immature Granulocytes 01/27/2023 0.5  0.0 - 0.5 % Final    Gran # (ANC) 01/27/2023 4.8  1.8 - 7.7 K/uL Final    Immature Grans (Abs) 01/27/2023 0.04  0.00 - 0.04 K/uL Final    Comment: Mild elevation in immature granulocytes is non specific and   can be seen in a variety of conditions including stress response,   acute inflammation, trauma and pregnancy. Correlation with other   laboratory and clinical findings is essential.      Lymph # 01/27/2023 1.7  1.0 - 4.8 K/uL Final    Mono # 01/27/2023 0.9  0.3 - 1.0 K/uL Final    Eos # 01/27/2023 0.2  0.0 - 0.5 K/uL Final    Baso # 01/27/2023 0.04  0.00 - 0.20 K/uL Final    nRBC 01/27/2023 0  0 /100 WBC Final    Gran % 01/27/2023 63.6  38.0 - 73.0 % Final    Lymph % 01/27/2023 21.9  18.0 - 48.0 % Final    Mono % 01/27/2023 11.3  4.0 - 15.0 % Final    Eosinophil % 01/27/2023 2.2  0.0 - 8.0 % Final    Basophil % 01/27/2023 0.5  0.0 - 1.9 % Final    Differential Method 01/27/2023 Automated   Final    POCT Glucose 01/27/2023 166 (H)  70 - 110 mg/dL Final    SARS-CoV-2 RNA, Amplification, Qual 01/27/2023 Negative  Negative Final    Comment: This test utilizes isothermal nucleic acid amplification technology   to   detect the SARS-CoV-2 RdRp nucleic acid segment. The analytical   sensitivity   (limit of detection) is 500 copies/swab.     A POSITIVE result is indicative of the presence of SARS-CoV-2 RNA;   clinical   correlation with patient history and other diagnostic information is   necessary to determine patient infection status.    A NEGATIVE result means that SARS-CoV-2 nucleic acids are not present   above   the limit of detection. A NEGATIVE result should be treated as   presumptive.   It does not rule out the possibility of COVID-19 and should not be   the sole   basis  for treatment decisions. If COVID-19 is strongly suspected   based on   clinical and exposure history, re-testing using an alternate   molecular assay   should be considered.     This test is only for use under the Food and Drug Administration s   Emergency   Use Authorization (EUA).     Commercial kits are provided by [facility]. Performanc                           e   characteristics of the EUA have been independently verified by   [facility] Department of Pathology and Laboratory Medicine.   _________________________________________________________________   The authorized Fact Sheet for Healthcare Providers and the authorized   Fact   Sheet for Patients of the ID NOW COVID-19 are available on the FDA   website:   https://www.fda.gov/media/920593/download   https://www.fda.gov/media/900751/download      POCT Glucose 01/27/2023 182 (H)  70 - 110 mg/dL Final    POCT Glucose 01/27/2023 113 (H)  70 - 110 mg/dL Final    POCT Glucose 01/27/2023 173 (H)  70 - 110 mg/dL Final    POCT Glucose 01/27/2023 161 (H)  70 - 110 mg/dL Final    Sodium 01/28/2023 135 (L)  136 - 145 mmol/L Final    Potassium 01/28/2023 4.4  3.5 - 5.1 mmol/L Final    Chloride 01/28/2023 100  95 - 110 mmol/L Final    CO2 01/28/2023 24  23 - 29 mmol/L Final    Glucose 01/28/2023 159 (H)  70 - 110 mg/dL Final    BUN 01/28/2023 11  8 - 23 mg/dL Final    Creatinine 01/28/2023 0.8  0.5 - 1.4 mg/dL Final    Calcium 01/28/2023 9.7  8.7 - 10.5 mg/dL Final    Anion Gap 01/28/2023 11  8 - 16 mmol/L Final    eGFR 01/28/2023 >60  >60 mL/min/1.73 m^2 Final    Magnesium 01/28/2023 2.2  1.6 - 2.6 mg/dL Final    WBC 01/28/2023 7.32  3.90 - 12.70 K/uL Final    RBC 01/28/2023 4.08 (L)  4.60 - 6.20 M/uL Final    Hemoglobin 01/28/2023 12.7 (L)  14.0 - 18.0 g/dL Final    Hematocrit 01/28/2023 38.4 (L)  40.0 - 54.0 % Final    MCV 01/28/2023 94  82 - 98 fL Final    MCH 01/28/2023 31.1 (H)  27.0 - 31.0 pg Final    MCHC 01/28/2023 33.1  32.0 - 36.0 g/dL  Final    RDW 01/28/2023 11.7  11.5 - 14.5 % Final    Platelets 01/28/2023 333  150 - 450 K/uL Final    MPV 01/28/2023 10.4  9.2 - 12.9 fL Final    Immature Granulocytes 01/28/2023 0.5  0.0 - 0.5 % Final    Gran # (ANC) 01/28/2023 6.0  1.8 - 7.7 K/uL Final    Immature Grans (Abs) 01/28/2023 0.04  0.00 - 0.04 K/uL Final    Comment: Mild elevation in immature granulocytes is non specific and   can be seen in a variety of conditions including stress response,   acute inflammation, trauma and pregnancy. Correlation with other   laboratory and clinical findings is essential.      Lymph # 01/28/2023 0.9 (L)  1.0 - 4.8 K/uL Final    Mono # 01/28/2023 0.3  0.3 - 1.0 K/uL Final    Eos # 01/28/2023 0.0  0.0 - 0.5 K/uL Final    Baso # 01/28/2023 0.01  0.00 - 0.20 K/uL Final    nRBC 01/28/2023 0  0 /100 WBC Final    Gran % 01/28/2023 82.1 (H)  38.0 - 73.0 % Final    Lymph % 01/28/2023 12.8 (L)  18.0 - 48.0 % Final    Mono % 01/28/2023 4.5  4.0 - 15.0 % Final    Eosinophil % 01/28/2023 0.0  0.0 - 8.0 % Final    Basophil % 01/28/2023 0.1  0.0 - 1.9 % Final    Differential Method 01/28/2023 Automated   Final    POCT Glucose 01/28/2023 157 (H)  70 - 110 mg/dL Final    POCT Glucose 01/28/2023 289 (H)  70 - 110 mg/dL Final    POCT Glucose 01/28/2023 211 (H)  70 - 110 mg/dL Final    POCT Glucose 01/28/2023 192 (H)  70 - 110 mg/dL Final    POCT Glucose 01/28/2023 187 (H)  70 - 110 mg/dL Final    Sodium 01/29/2023 136  136 - 145 mmol/L Final    Potassium 01/29/2023 4.3  3.5 - 5.1 mmol/L Final    Chloride 01/29/2023 102  95 - 110 mmol/L Final    CO2 01/29/2023 25  23 - 29 mmol/L Final    Glucose 01/29/2023 234 (H)  70 - 110 mg/dL Final    BUN 01/29/2023 20  8 - 23 mg/dL Final    Creatinine 01/29/2023 0.9  0.5 - 1.4 mg/dL Final    Calcium 01/29/2023 8.5 (L)  8.7 - 10.5 mg/dL Final    Anion Gap 01/29/2023 9  8 - 16 mmol/L Final    eGFR 01/29/2023 >60  >60 mL/min/1.73 m^2 Final    Magnesium 01/29/2023 2.4  1.6 - 2.6 mg/dL Final    WBC  01/29/2023 7.29  3.90 - 12.70 K/uL Final    RBC 01/29/2023 3.76 (L)  4.60 - 6.20 M/uL Final    Hemoglobin 01/29/2023 11.7 (L)  14.0 - 18.0 g/dL Final    Hematocrit 01/29/2023 35.4 (L)  40.0 - 54.0 % Final    MCV 01/29/2023 94  82 - 98 fL Final    MCH 01/29/2023 31.1 (H)  27.0 - 31.0 pg Final    MCHC 01/29/2023 33.1  32.0 - 36.0 g/dL Final    RDW 01/29/2023 11.7  11.5 - 14.5 % Final    Platelets 01/29/2023 308  150 - 450 K/uL Final    MPV 01/29/2023 10.9  9.2 - 12.9 fL Final    Immature Granulocytes 01/29/2023 0.8 (H)  0.0 - 0.5 % Final    Gran # (ANC) 01/29/2023 5.7  1.8 - 7.7 K/uL Final    Immature Grans (Abs) 01/29/2023 0.06 (H)  0.00 - 0.04 K/uL Final    Comment: Mild elevation in immature granulocytes is non specific and   can be seen in a variety of conditions including stress response,   acute inflammation, trauma and pregnancy. Correlation with other   laboratory and clinical findings is essential.      Lymph # 01/29/2023 1.1  1.0 - 4.8 K/uL Final    Mono # 01/29/2023 0.4  0.3 - 1.0 K/uL Final    Eos # 01/29/2023 0.0  0.0 - 0.5 K/uL Final    Baso # 01/29/2023 0.02  0.00 - 0.20 K/uL Final    nRBC 01/29/2023 0  0 /100 WBC Final    Gran % 01/29/2023 77.8 (H)  38.0 - 73.0 % Final    Lymph % 01/29/2023 15.2 (L)  18.0 - 48.0 % Final    Mono % 01/29/2023 5.6  4.0 - 15.0 % Final    Eosinophil % 01/29/2023 0.3  0.0 - 8.0 % Final    Basophil % 01/29/2023 0.3  0.0 - 1.9 % Final    Differential Method 01/29/2023 Automated   Final    POCT Glucose 01/29/2023 241 (H)  70 - 110 mg/dL Final   Lab Visit on 01/26/2023   Component Date Value Ref Range Status    WBC 01/26/2023 8.09  3.90 - 12.70 K/uL Final    RBC 01/26/2023 3.86 (L)  4.60 - 6.20 M/uL Final    Hemoglobin 01/26/2023 11.9 (L)  14.0 - 18.0 g/dL Final    Hematocrit 01/26/2023 37.2 (L)  40.0 - 54.0 % Final    MCV 01/26/2023 96  82 - 98 fL Final    MCH 01/26/2023 30.8  27.0 - 31.0 pg Final    MCHC 01/26/2023 32.0  32.0 - 36.0 g/dL Final    RDW 01/26/2023 12.1  11.5 -  14.5 % Final    Platelets 01/26/2023 302  150 - 450 K/uL Final    MPV 01/26/2023 12.0  9.2 - 12.9 fL Final    Immature Granulocytes 01/26/2023 0.4  0.0 - 0.5 % Final    Gran # (ANC) 01/26/2023 5.1  1.8 - 7.7 K/uL Final    Immature Grans (Abs) 01/26/2023 0.03  0.00 - 0.04 K/uL Final    Comment: Mild elevation in immature granulocytes is non specific and   can be seen in a variety of conditions including stress response,   acute inflammation, trauma and pregnancy. Correlation with other   laboratory and clinical findings is essential.      Lymph # 01/26/2023 1.8  1.0 - 4.8 K/uL Final    Mono # 01/26/2023 0.9  0.3 - 1.0 K/uL Final    Eos # 01/26/2023 0.3  0.0 - 0.5 K/uL Final    Baso # 01/26/2023 0.05  0.00 - 0.20 K/uL Final    nRBC 01/26/2023 0  0 /100 WBC Final    Gran % 01/26/2023 62.6  38.0 - 73.0 % Final    Lymph % 01/26/2023 22.1  18.0 - 48.0 % Final    Mono % 01/26/2023 10.5  4.0 - 15.0 % Final    Eosinophil % 01/26/2023 3.8  0.0 - 8.0 % Final    Basophil % 01/26/2023 0.6  0.0 - 1.9 % Final    Differential Method 01/26/2023 Automated   Final    Sodium 01/26/2023 135 (L)  136 - 145 mmol/L Final    Potassium 01/26/2023 4.2  3.5 - 5.1 mmol/L Final    Chloride 01/26/2023 100  95 - 110 mmol/L Final    CO2 01/26/2023 25  23 - 29 mmol/L Final    Glucose 01/26/2023 85  70 - 110 mg/dL Final    BUN 01/26/2023 9  8 - 23 mg/dL Final    Creatinine 01/26/2023 0.9  0.5 - 1.4 mg/dL Final    Calcium 01/26/2023 8.9  8.7 - 10.5 mg/dL Final    Total Protein 01/26/2023 6.7  6.0 - 8.4 g/dL Final    Albumin 01/26/2023 2.7 (L)  3.5 - 5.2 g/dL Final    Total Bilirubin 01/26/2023 0.4  0.1 - 1.0 mg/dL Final    Comment: For infants and newborns, interpretation of results should be based  on gestational age, weight and in agreement with clinical  observations.    Premature Infant recommended reference ranges:  Up to 24 hours.............<8.0 mg/dL  Up to 48 hours............<12.0 mg/dL  3-5 days..................<15.0 mg/dL  6-29  days.................<15.0 mg/dL      Alkaline Phosphatase 01/26/2023 93  55 - 135 U/L Final    AST 01/26/2023 16  10 - 40 U/L Final    ALT 01/26/2023 15  10 - 44 U/L Final    Anion Gap 01/26/2023 10  8 - 16 mmol/L Final    eGFR 01/26/2023 >60.0  >60 mL/min/1.73 m^2 Final   No results displayed because visit has over 200 results.      No results displayed because visit has over 200 results.          Imaging  No results found.    Assessment  1. Atherosclerosis of native coronary artery of native heart without angina pectoris  Controlled and stable    2. Chronic diastolic heart failure  Compensated    3. Atherosclerotic peripheral vascular disease  Asymptomatic    4. Primary hypertension  Controlled    5. Severe obesity  Unchanged    6. Supraventricular tachycardia  Well controlled.  Probably related to his acute infectious illness.      Plan and Discussion    Will stop digoxin as he request and see how he does with his SVT.  Continue other guideline directed medical therapy.    The 10-year ASCVD risk score (Rosalie DK, et al., 2019) is: 39.7%    Values used to calculate the score:      Age: 67 years      Sex: Male      Is Non- : No      Diabetic: Yes      Tobacco smoker: Yes      Systolic Blood Pressure: 126 mmHg      Is BP treated: Yes      HDL Cholesterol: 44 mg/dL      Total Cholesterol: 172 mg/dL     Follow Up  Follow up in about 6 months (around 12/22/2023).      Shemar Dempsey MD

## 2023-06-27 ENCOUNTER — PATIENT MESSAGE (OUTPATIENT)
Dept: CARDIOLOGY | Facility: CLINIC | Age: 68
End: 2023-06-27
Payer: MEDICARE

## 2023-07-05 ENCOUNTER — HOSPITAL ENCOUNTER (OUTPATIENT)
Dept: RADIOLOGY | Facility: OTHER | Age: 68
Discharge: HOME | End: 2023-07-05
Attending: NURSE PRACTITIONER
Payer: MEDICARE

## 2023-07-05 ENCOUNTER — PATIENT MESSAGE (OUTPATIENT)
Dept: DIABETES | Facility: CLINIC | Age: 68
End: 2023-07-05
Payer: MEDICARE

## 2023-07-05 ENCOUNTER — PATIENT MESSAGE (OUTPATIENT)
Dept: RHEUMATOLOGY | Facility: CLINIC | Age: 68
End: 2023-07-05
Payer: MEDICARE

## 2023-07-05 DIAGNOSIS — Z79.52 LONG TERM (CURRENT) USE OF SYSTEMIC STEROIDS: ICD-10-CM

## 2023-07-05 PROCEDURE — 77080 DXA BONE DENSITY AXIAL: CPT | Mod: 26,,, | Performed by: RADIOLOGY

## 2023-07-05 PROCEDURE — 77080 DXA BONE DENSITY AXIAL SKELETON 1 OR MORE SITES: ICD-10-PCS | Mod: 26,,, | Performed by: RADIOLOGY

## 2023-07-05 PROCEDURE — 77080 DXA BONE DENSITY AXIAL: CPT | Mod: TC

## 2023-07-14 ENCOUNTER — PATIENT MESSAGE (OUTPATIENT)
Dept: ENDOCRINOLOGY | Facility: CLINIC | Age: 68
End: 2023-07-14
Payer: MEDICARE

## 2023-07-14 NOTE — PROGRESS NOTES
Subjective:      Chief Complaint: No chief complaint on file.    HPI: Nithin Wagner is a 67 y.o. male who is here for a follow-up evaluation for diabetes. Last seen 4/2023 with Dr De Los Santos.    The patient location is: at home  The chief complaint leading to consultation is: diabetes    Visit type: audiovisual    Face to Face time with patient: 44 minutes   60 minutes of total time spent on the encounter, which includes face to face time and non-face to face time preparing to see the patient (eg, review of tests), Obtaining and/or reviewing separately obtained history, Documenting clinical information in the electronic or other health record, Independently interpreting results (not separately reported) and communicating results to the patient/family/caregiver, or Care coordination (not separately reported).    Each patient to whom he or she provides medical services by telemedicine is:  (1) informed of the relationship between the physician and patient and the respective role of any other health care provider with respect to management of the patient; and (2) notified that he or she may decline to receive medical services by telemedicine and may withdraw from such care at any time.    Patient reported in 2007, had heart issues (atrial myxoma), surgery, issues with anesthesia. Then had pain, f/u showed lung issues, then surgery 2008. Later RA, on mtx, other meds. Including steroids.    currently on 15 mg prednisone per day, sometimes 15 mg alternating days     States he has lost around 70 pounds since Jan 2022 -diet and addition of Mounjaro. 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     He is no longer taking Mounjaro due to expense and injection fatigue    With regards to the diabetes:  Diagnosed with T2DM since around 2614-7508. Had symptoms, sugar 300. Metformin initially, other pills, GLP1, then lately insulin.     Known complications:    Retinopathy- Denies; needs eye  exam    Nephropathy- No    Neuropathy- No    CAD? Yes, hx stents. Also HFpEF    CVA- TIA with myxoma, denies CVA in the past     Since his last visit, he has been having problems with cost of novolog. He has been on and off of pump. States pod hurts at times, zacarias when he is giving more than 2 units. He has been going back to injections when he wants to take a pump break.     Today, using inpen     Medications    INPEN with novolog cartridges   Average <100 units of novolog daily   I:C 1:5 and states he is sometimes changing 1:15 if lower carb; giving about 70 units of novolog daily  ISF 20  Max      He is giving novolg 6-8 units daily   He is changing needle every time. He is rotating injection sites. He is giving novolog before a meal 60 minutes     Tresiba U200 32 units daily   Tresiba U100 60 units daily    Current regimen:  Tresiba 32 units daily  States he is taking tresiba to make the pods last longer  Omnipod 5 occ    Basal 2.4  Carb 5  ISF 20    Target: 140 overnight  Starting at 7am, target 120    37% basal 28.4 units   63% bolus 48/2 units  Automated mode 100%    Missed doses-denies     Other medications tried:   onglyza-caused HF   metformin. Wasn't helping as much   other meds   Trulicity: 4.5 mg/week. Tuesdays. Stopped in the hospital   Basal insulin: Tresiba 44 units daily   Prandial insulin: Novolog 14 units TIDWM plus scale.     #4 times a day testing  Dexcom         Log reviewed: some prolonged postprandial hyperglycemia   Hypoglycemic event- denies   Knows how to correct with 15 grams of carbs- juice, coke, or a peppermint.     Dietary recall: He is working on diet.  Eats 3 meals a day.   Snacks: cheese and crackers or sardines   Drinks: caffeine in coffee spikes blood sugar and was drinking 3 pots in the past     Exercise - He is trying to incorporate exercise -walking and using rubber bands     Education - last visit: 6/2023    Has a Medic alert tag- has pendant for falls   Has been having   more pain when walking lately  Glucagon/Baqsimi-     Diabetes Management Status  Statin: Not taking- did not tolerate  ACE/ARB: Taking    Lab Results   Component Value Date    HGBA1C 7.4 (H) 04/19/2023    HGBA1C 7.5 (H) 03/23/2023    HGBA1C 7.6 (H) 11/15/2022     Screening or Prevention Patient's value Goal Complete/Controlled?   HgA1C Testing and Control   Lab Results   Component Value Date    HGBA1C 7.4 (H) 04/19/2023      Annually/Less than 8% Yes   Lipid profile : 04/19/2023 Annually Yes   LDL control Lab Results   Component Value Date    LDLCALC 106.6 04/19/2023    Annually/Less than 100 mg/dl  No   Nephropathy screening Lab Results   Component Value Date    LABMICR 6.0 04/19/2023     Lab Results   Component Value Date    PROTEINUA Negative 01/26/2023    Annually Yes   Blood pressure BP Readings from Last 1 Encounters:   06/22/23 126/70    Less than 140/90 Yes   Dilated retinal exam : 09/05/2017 Annually Yes   Foot exam   : 05/06/2021 Annually No     With regards to osteoporosis,     Based on FRAX score     He is on ergo 50k weekly and MVI with 1000 IU daily  He has calcium in diet; eating more cheese and dairy     current medication: none    Weight bearing exercise- Walking and also using rubber bands for strength training   Recent falls- stumbles and grabs himself once every 2 to 3 days; actual fall in Feb/March 2023    They have made fall precautions in house   Dental work- denies     No recent fractures   No significant height loss (>2 inches)    tob use -  1 ppd   etoh use-denies      No current diarrhea or h/o malabsorption  + chronic exposure to steroid therapy,  - anticoagulants, proton pump inhibitors or antiseizure medications.     + GERD, indigestion,   Denies gastric bypass surgery.     Denies history of thyroid disease, anemia, kidney stones or kidney disease.     Denies active malignancy, history of malignancy the involved the bone, prior radiation treatment, or unexplained elevations of alk phos  on labs     Bone density    7/5/23  FINDINGS:  The bone mineral density measured from L1 through L4 is 1.307g/cm2.  This corresponds to a T score of 0.6 and a Z score of 0.5.  Decrease in bone mineral density by 4%.     The bone mineral density within the left femoral neck measures 0.816 g/cm2.  This corresponds to a T score of -2.0 and a Z score of -1.1.     The bone mineral density within the right femoral neck measures 0.855 g/cm2.  This corresponds to a T score of -1.7 and a Z score of -0.8.  Decrease in bone mineral density by 10%.     FRAX RESULTS:     10-year Probability of Fracture:   Major Osteoporotic Fracture 10.3%.   Hip Fracture 4.1%.   Impression:     Osteopenia both hips with decrease in bone mineral density by 10%.    Vit D, 25-Hydroxy 30 - 96 ng/mL 20 (L)   (L): Data is abnormally low    With regards to dyslipidemia,   Unable to tolerate statin- allergy  States he has tried several in the past and caused heart arrhythmia   Does not want to try praulent or repatha      Latest Reference Range & Units 03/23/23 08:15 04/19/23 10:02   Cholesterol 120 - 199 mg/dL 183 172   HDL 40 - 75 mg/dL 47 44   HDL/Cholesterol Ratio 20.0 - 50.0 % 25.7 25.6   LDL Cholesterol External 63.0 - 159.0 mg/dL 108.8 106.6   Non-HDL Cholesterol mg/dL 136 128   Total Cholesterol/HDL Ratio 2.0 - 5.0  3.9 3.9   Triglycerides 30 - 150 mg/dL 136 107     With regards to elevated prolactin,    Currently pt reports no galactorrhea  + diplopia that recently started around 2 months ago  - chronic headaches    No unexplained weight changes.  + fatigue  He does report some sleep problems. Has pulse ox- decreasing O2. States he has CPAP but unable to tolerate mask. States he has scarring on trachea from intubation in the past    Denies cold intolerance   No nausea or vomiting  + orthostatic symptoms     The patient is not currently using any medication known to cause hyperprolactinemia such as: antipsychotics (risperidone, phenothiazines,  haloperidol and butyrophenones),gastric motility drugs (metoclopramide and domperidone), or antihypertensives (methyldopa, reserpine, and verapamil).     Latest Reference Range & Units 06/21/22 07:23   Prolactin 3.5 - 19.4 ng/mL 28.8 (H)      Latest Reference Range & Units 06/21/22 07:23   FSH 0.95 - 11.95 mIU/mL 5.93   LH 0.6 - 12.1 mIU/mL 2.4   Prolactin 3.5 - 19.4 ng/mL 28.8 (H)   Sex Hormone Binding Globulin 22 - 77 nmol/L 79 (H)   Testosterone 250 - 1100 ng/dL 542   Testosterone, Bioavailable 110.0 - 575.0 ng/dL 63.7 (L)   Testosterone, Free 46.0 - 224.0 pg/mL 33.8 (L)   (H): Data is abnormally high  (L): Data is abnormally low  Reviewed past medical, family, social history and updated as appropriate.     Review of Systems   Constitutional:  Negative for fatigue and unexpected weight change.   Eyes:  Negative for visual disturbance.   Endocrine: Negative for polydipsia, polyphagia and polyuria.        Hair loss   As above    Objective:     There were no vitals filed for this visit.    Prior vitals:  BP Readings from Last 5 Encounters:   06/22/23 126/70   04/26/23 136/75   03/31/23 (!) 148/89   03/01/23 110/64   02/19/23 130/85     Physical Exam  Vitals reviewed.   Constitutional:       Appearance: He is obese.   Neurological:      Mental Status: He is alert.     Wt Readings from Last 10 Encounters:   06/22/23 1422 98.4 kg (217 lb)   04/26/23 1254 94.5 kg (208 lb 5.4 oz)   04/25/23 1238 94.5 kg (208 lb 5.4 oz)   03/31/23 1551 94.3 kg (208 lb)   03/22/23 1347 94.4 kg (208 lb 1.8 oz)   03/01/23 1441 94 kg (207 lb 3.7 oz)   03/01/23 1404 94.3 kg (208 lb)   02/19/23 1429 91.6 kg (202 lb)   02/06/23 1325 92 kg (202 lb 12.8 oz)   02/01/23 1611 96.3 kg (212 lb 4.9 oz)     Lab Results   Component Value Date    HGBA1C 7.4 (H) 04/19/2023     Lab Results   Component Value Date    CHOL 172 04/19/2023    HDL 44 04/19/2023    LDLCALC 106.6 04/19/2023    TRIG 107 04/19/2023    CHOLHDL 25.6 04/19/2023     Lab Results    Component Value Date     04/19/2023    K 4.3 04/19/2023     04/19/2023    CO2 29 04/19/2023     (H) 04/19/2023    BUN 23 04/19/2023    CREATININE 0.9 04/19/2023    CALCIUM 9.6 04/19/2023    PROT 7.2 03/23/2023    ALBUMIN 3.8 03/23/2023    BILITOT 0.4 03/23/2023    ALKPHOS 97 03/23/2023    AST 15 03/23/2023    ALT 23 03/23/2023    ANIONGAP 9 04/19/2023    ESTGFRAFRICA >60 07/21/2022    EGFRNONAA >60 07/21/2022    TSH 0.999 04/19/2023      Lab Results   Component Value Date    MICALBCREAT 7.6 04/19/2023     Assessment/Plan:     1. Type 2 diabetes mellitus with hyperglycemia, with long-term current use of insulin  glucagon (BAQSIMI) 3 mg/actuation Spry    insulin degludec (TRESIBA FLEXTOUCH U-200) 200 unit/mL (3 mL) insulin pen      2. Vitamin D deficiency        3. Hyperprolactinemia        4. Class 1 obesity due to excess calories with serious comorbidity and body mass index (BMI) of 34.0 to 34.9 in adult        5. Osteoporosis, unspecified osteoporosis type, unspecified pathological fracture presence          Type 2 diabetes mellitus with hyperglycemia, with long-term current use of insulin  Reviewed goals of therapy - to get the best control we can without hypoglycemia. Goal <7.5   - last a1c pretty much at goal  Not interested in additional copay with [employer]    Check labs   If insulin not covered, can switch to Novolin R $24 a vial at [facility] without prescription  Log reviewed: some prolonged postprandial hyperglycemia   He is insulin stacking likely due to need for more long acting insulin    Medication Changes   INPEN with Fiasp cartridges   Average <100 units of novolog daily   I:C 1:5 and states he is sometimes changing 1:15 if lower carb  ISF 20  Max    with  Tresiba U200 36 units daily   Tresiba U100 66 units daily     Current regimen:  Tresiba 30 units daily  Omnipod 5     Basal 2.4  Carb 5  ISF 20    Reviewed pump data and blood sugars are at goal.     Reviewed patient's  current insulin regimen. Clarified proper insulin dose and timing in relation to meals, etc. Insulin injection sites and proper rotation instructed.   - reviewed dose adjustments as well.   -Advised frequent self blood glucose monitoring. Patient encouraged to document glucose results and bring them to every clinic visit    -Hypoglycemia precautions discussed. Instructed on precautions before driving.    -Close adherence to lifestyle changes recommended.    -Periodic follow ups for eye evaluations, foot care suggested.  Not on statin due to allergy - consider PCSK9 in future    Vitamin D deficiency  Check Vitamin D   Low vitamin D in Oct 2022    Hyperprolactinemia  Seen on labs in June 2022   Repeat prolactin    Class 1 obesity due to excess calories with serious comorbidity and body mass index (BMI) of 34.0 to 34.9 in adult    BMI Readings from Last 1 Encounters:   06/22/23 31.14 kg/m²    complicated by HTN, HLD, diabetes   encourage pt to work on healthy diet, increase exercise as tolerated.        Osteoporosis   Based on FRAX score    Does want to try osteoporosis    Avoid falls    Stop smoking    Continue exercise   Continue vitamin D and calcium in diet      Follow up in about 6 weeks (around 8/28/2023).  Labs this week

## 2023-07-16 PROBLEM — E22.1 HYPERPROLACTINEMIA: Chronic | Status: ACTIVE | Noted: 2023-06-19

## 2023-07-16 PROBLEM — M85.80 OSTEOPENIA: Status: ACTIVE | Noted: 2023-07-16

## 2023-07-17 ENCOUNTER — OFFICE VISIT (OUTPATIENT)
Dept: ENDOCRINOLOGY | Facility: CLINIC | Age: 68
End: 2023-07-17
Payer: MEDICARE

## 2023-07-17 DIAGNOSIS — E22.1 HYPERPROLACTINEMIA: Chronic | ICD-10-CM

## 2023-07-17 DIAGNOSIS — E66.09 CLASS 1 OBESITY DUE TO EXCESS CALORIES WITH SERIOUS COMORBIDITY AND BODY MASS INDEX (BMI) OF 34.0 TO 34.9 IN ADULT: ICD-10-CM

## 2023-07-17 DIAGNOSIS — E11.65 TYPE 2 DIABETES MELLITUS WITH HYPERGLYCEMIA, WITH LONG-TERM CURRENT USE OF INSULIN: ICD-10-CM

## 2023-07-17 DIAGNOSIS — Z79.4 TYPE 2 DIABETES MELLITUS WITH HYPERGLYCEMIA, WITH LONG-TERM CURRENT USE OF INSULIN: ICD-10-CM

## 2023-07-17 DIAGNOSIS — E55.9 VITAMIN D DEFICIENCY: ICD-10-CM

## 2023-07-17 DIAGNOSIS — M81.0 OSTEOPOROSIS, UNSPECIFIED OSTEOPOROSIS TYPE, UNSPECIFIED PATHOLOGICAL FRACTURE PRESENCE: ICD-10-CM

## 2023-07-17 PROCEDURE — 3051F PR MOST RECENT HEMOGLOBIN A1C LEVEL 7.0 - < 8.0%: ICD-10-PCS | Mod: CPTII,95,, | Performed by: NURSE PRACTITIONER

## 2023-07-17 PROCEDURE — 1159F MED LIST DOCD IN RCRD: CPT | Mod: CPTII,95,, | Performed by: NURSE PRACTITIONER

## 2023-07-17 PROCEDURE — 4010F ACE/ARB THERAPY RXD/TAKEN: CPT | Mod: CPTII,95,, | Performed by: NURSE PRACTITIONER

## 2023-07-17 PROCEDURE — 1160F RVW MEDS BY RX/DR IN RCRD: CPT | Mod: CPTII,95,, | Performed by: NURSE PRACTITIONER

## 2023-07-17 PROCEDURE — 1160F PR REVIEW ALL MEDS BY PRESCRIBER/CLIN PHARMACIST DOCUMENTED: ICD-10-PCS | Mod: CPTII,95,, | Performed by: NURSE PRACTITIONER

## 2023-07-17 PROCEDURE — 3061F PR NEG MICROALBUMINURIA RESULT DOCUMENTED/REVIEW: ICD-10-PCS | Mod: CPTII,95,, | Performed by: NURSE PRACTITIONER

## 2023-07-17 PROCEDURE — 3066F NEPHROPATHY DOC TX: CPT | Mod: CPTII,95,, | Performed by: NURSE PRACTITIONER

## 2023-07-17 PROCEDURE — 4010F PR ACE/ARB THEARPY RXD/TAKEN: ICD-10-PCS | Mod: CPTII,95,, | Performed by: NURSE PRACTITIONER

## 2023-07-17 PROCEDURE — 3051F HG A1C>EQUAL 7.0%<8.0%: CPT | Mod: CPTII,95,, | Performed by: NURSE PRACTITIONER

## 2023-07-17 PROCEDURE — 3066F PR DOCUMENTATION OF TREATMENT FOR NEPHROPATHY: ICD-10-PCS | Mod: CPTII,95,, | Performed by: NURSE PRACTITIONER

## 2023-07-17 PROCEDURE — 99214 PR OFFICE/OUTPT VISIT, EST, LEVL IV, 30-39 MIN: ICD-10-PCS | Mod: 25,95,, | Performed by: NURSE PRACTITIONER

## 2023-07-17 PROCEDURE — 3061F NEG MICROALBUMINURIA REV: CPT | Mod: CPTII,95,, | Performed by: NURSE PRACTITIONER

## 2023-07-17 PROCEDURE — 1159F PR MEDICATION LIST DOCUMENTED IN MEDICAL RECORD: ICD-10-PCS | Mod: CPTII,95,, | Performed by: NURSE PRACTITIONER

## 2023-07-17 PROCEDURE — 99214 OFFICE O/P EST MOD 30 MIN: CPT | Mod: 25,95,, | Performed by: NURSE PRACTITIONER

## 2023-07-17 RX ORDER — GLUCAGON 3 MG/1
POWDER NASAL
Qty: 1 EACH | Refills: 1 | Status: SHIPPED | OUTPATIENT
Start: 2023-07-17 | End: 2023-11-24

## 2023-07-17 RX ORDER — INSULIN DEGLUDEC 200 U/ML
36 INJECTION, SOLUTION SUBCUTANEOUS DAILY
Qty: 2 PEN | Refills: 10 | Status: SHIPPED | OUTPATIENT
Start: 2023-07-17 | End: 2023-08-31 | Stop reason: SDUPTHER

## 2023-07-17 NOTE — PATIENT INSTRUCTIONS
"[email or web address]    Osteoporosis     Based on FRAX score    Does want to try osteoporosis    Avoid falls    Stop smoking    Continue exercise   Continue vitamin D and calcium in diet    Osteoporosis medications  These are the medications we discussed for osteoporosis treatment:    - Bisphosphonates:         - these slow down bone loss         - oral forms (Fosamax and Actonel are examples) - taken once weekly or once monthly         - IV form (Reclast) - given intravenously once yearly    - Prolia (denosumab):          - slows down bone loss           - it is a subcutaneous injection (under the skin) every 6 months, given in the office    - Forteo (teriparatide) or Tymlos (abaloparatide):           - medications that "build bone" or increases bone formation           - subcutaneous injection (under the skin) taken once daily that you self-administer at home for 18-24 months    -Evenity (romosozumab)   -medications that "build bone" or increases bone formation   - subcutaneous injection (under the skin) taken once monthly for one year    For more information on medications: https://www.nof.org/patients/treatment/medicationadherence/    Cholesterol               LDL goal less than 70              Consider Praulent in the future if needed   Check LP in future      High Prolactin level               Check prolactin level      Diabetes  Check labs   If insulin not covered, can switch to Novolin R $24 a vial at [facility] without prescription  Log reviewed: some prolonged postprandial hyperglycemia   He is insulin stacking likely due to need for more long acting insulin    Medication Changes   INPEN with Fiasp cartridges   Average <100 units of novolog daily   I:C 1:5 and states he is sometimes changing 1:15 if lower carb  ISF 20  Max    with  Tresiba U200 36 units daily   Tresiba U100 66 units daily     Current regimen:  Tresiba 30 units daily  Omnipod 5     Basal 2.4  Carb 5  ISF 20     Reviewed pump data " and blood sugars are at goal

## 2023-07-17 NOTE — ASSESSMENT & PLAN NOTE
Reviewed goals of therapy - to get the best control we can without hypoglycemia. Goal <7.5   - last a1c pretty much at goal  Not interested in additional copay with mounjaro    Check labs   If insulin not covered, can switch to Novolin R $24 a vial at [facility] without prescription  Log reviewed: some prolonged postprandial hyperglycemia   He is insulin stacking likely due to need for more long acting insulin    Medication Changes   INPEN with Fiasp cartridges   Average <100 units of novolog daily   I:C 1:5 and states he is sometimes changing 1:15 if lower carb  ISF 20  Max    with  Tresiba U200 36 units daily   Tresiba U100 66 units daily     Current regimen:  Tresiba 30 units daily  Omnipod 5     Basal 2.4  Carb 5  ISF 20    Reviewed pump data and blood sugars are at goal.     Reviewed patient's current insulin regimen. Clarified proper insulin dose and timing in relation to meals, etc. Insulin injection sites and proper rotation instructed.   - reviewed dose adjustments as well.   -Advised frequent self blood glucose monitoring. Patient encouraged to document glucose results and bring them to every clinic visit    -Hypoglycemia precautions discussed. Instructed on precautions before driving.    -Close adherence to lifestyle changes recommended.    -Periodic follow ups for eye evaluations, foot care suggested.  Not on statin due to allergy - consider PCSK9 in future

## 2023-07-17 NOTE — ASSESSMENT & PLAN NOTE
Based on FRAX score    Does want to try osteoporosis    Avoid falls    Stop smoking    Continue exercise   Continue vitamin D and calcium in diet

## 2023-07-17 NOTE — ASSESSMENT & PLAN NOTE
BMI Readings from Last 1 Encounters:   06/22/23 31.14 kg/m²    complicated by HTN, HLD, diabetes   encourage pt to work on healthy diet, increase exercise as tolerated.

## 2023-07-19 ENCOUNTER — LAB VISIT (OUTPATIENT)
Dept: LAB | Facility: OTHER | Age: 68
End: 2023-07-19
Attending: NURSE PRACTITIONER
Payer: MEDICARE

## 2023-07-19 DIAGNOSIS — M06.9 RHEUMATOID ARTHRITIS FLARE: ICD-10-CM

## 2023-07-19 DIAGNOSIS — E22.1 HYPERPROLACTINEMIA: ICD-10-CM

## 2023-07-19 DIAGNOSIS — E11.65 TYPE 2 DIABETES MELLITUS WITH HYPERGLYCEMIA, WITH LONG-TERM CURRENT USE OF INSULIN: ICD-10-CM

## 2023-07-19 DIAGNOSIS — Z79.4 TYPE 2 DIABETES MELLITUS WITH HYPERGLYCEMIA, WITH LONG-TERM CURRENT USE OF INSULIN: ICD-10-CM

## 2023-07-19 LAB
ALBUMIN SERPL BCP-MCNC: 3.4 G/DL (ref 3.5–5.2)
ALP SERPL-CCNC: 85 U/L (ref 55–135)
ALT SERPL W/O P-5'-P-CCNC: 31 U/L (ref 10–44)
ANION GAP SERPL CALC-SCNC: 8 MMOL/L (ref 8–16)
ANION GAP SERPL CALC-SCNC: 8 MMOL/L (ref 8–16)
AST SERPL-CCNC: 19 U/L (ref 10–40)
BASOPHILS # BLD AUTO: 0.06 K/UL (ref 0–0.2)
BASOPHILS NFR BLD: 0.8 % (ref 0–1.9)
BILIRUB SERPL-MCNC: 0.5 MG/DL (ref 0.1–1)
BUN SERPL-MCNC: 15 MG/DL (ref 8–23)
BUN SERPL-MCNC: 15 MG/DL (ref 8–23)
CALCIUM SERPL-MCNC: 9 MG/DL (ref 8.7–10.5)
CALCIUM SERPL-MCNC: 9.2 MG/DL (ref 8.7–10.5)
CHLORIDE SERPL-SCNC: 104 MMOL/L (ref 95–110)
CHLORIDE SERPL-SCNC: 105 MMOL/L (ref 95–110)
CO2 SERPL-SCNC: 27 MMOL/L (ref 23–29)
CO2 SERPL-SCNC: 27 MMOL/L (ref 23–29)
CREAT SERPL-MCNC: 0.9 MG/DL (ref 0.5–1.4)
CREAT SERPL-MCNC: 0.9 MG/DL (ref 0.5–1.4)
DIFFERENTIAL METHOD: ABNORMAL
EOSINOPHIL # BLD AUTO: 0.4 K/UL (ref 0–0.5)
EOSINOPHIL NFR BLD: 4.5 % (ref 0–8)
ERYTHROCYTE [DISTWIDTH] IN BLOOD BY AUTOMATED COUNT: 13.2 % (ref 11.5–14.5)
EST. GFR  (NO RACE VARIABLE): >60 ML/MIN/1.73 M^2
EST. GFR  (NO RACE VARIABLE): >60 ML/MIN/1.73 M^2
ESTIMATED AVG GLUCOSE: 154 MG/DL (ref 68–131)
GLUCOSE SERPL-MCNC: 143 MG/DL (ref 70–110)
GLUCOSE SERPL-MCNC: 144 MG/DL (ref 70–110)
HBA1C MFR BLD: 7 % (ref 4–5.6)
HCT VFR BLD AUTO: 49.7 % (ref 40–54)
HGB BLD-MCNC: 16.1 G/DL (ref 14–18)
IMM GRANULOCYTES # BLD AUTO: 0.05 K/UL (ref 0–0.04)
IMM GRANULOCYTES NFR BLD AUTO: 0.6 % (ref 0–0.5)
LYMPHOCYTES # BLD AUTO: 2.8 K/UL (ref 1–4.8)
LYMPHOCYTES NFR BLD: 35.3 % (ref 18–48)
MCH RBC QN AUTO: 31.6 PG (ref 27–31)
MCHC RBC AUTO-ENTMCNC: 32.4 G/DL (ref 32–36)
MCV RBC AUTO: 98 FL (ref 82–98)
MONOCYTES # BLD AUTO: 0.5 K/UL (ref 0.3–1)
MONOCYTES NFR BLD: 6.8 % (ref 4–15)
NEUTROPHILS # BLD AUTO: 4.1 K/UL (ref 1.8–7.7)
NEUTROPHILS NFR BLD: 52 % (ref 38–73)
NRBC BLD-RTO: 0 /100 WBC
PLATELET # BLD AUTO: 191 K/UL (ref 150–450)
PMV BLD AUTO: 11.3 FL (ref 9.2–12.9)
POTASSIUM SERPL-SCNC: 4 MMOL/L (ref 3.5–5.1)
POTASSIUM SERPL-SCNC: 4 MMOL/L (ref 3.5–5.1)
PROLACTIN SERPL IA-MCNC: 19 NG/ML (ref 3.5–19.4)
PROT SERPL-MCNC: 6.5 G/DL (ref 6–8.4)
RBC # BLD AUTO: 5.09 M/UL (ref 4.6–6.2)
SODIUM SERPL-SCNC: 139 MMOL/L (ref 136–145)
SODIUM SERPL-SCNC: 140 MMOL/L (ref 136–145)
TSH SERPL DL<=0.005 MIU/L-ACNC: 2.4 UIU/ML (ref 0.4–4)
WBC # BLD AUTO: 7.82 K/UL (ref 3.9–12.7)

## 2023-07-19 PROCEDURE — 85025 COMPLETE CBC W/AUTO DIFF WBC: CPT | Performed by: INTERNAL MEDICINE

## 2023-07-19 PROCEDURE — 84443 ASSAY THYROID STIM HORMONE: CPT | Performed by: NURSE PRACTITIONER

## 2023-07-19 PROCEDURE — 36415 COLL VENOUS BLD VENIPUNCTURE: CPT | Performed by: NURSE PRACTITIONER

## 2023-07-19 PROCEDURE — 80048 BASIC METABOLIC PNL TOTAL CA: CPT | Mod: XB | Performed by: INTERNAL MEDICINE

## 2023-07-19 PROCEDURE — 80053 COMPREHEN METABOLIC PANEL: CPT | Performed by: NURSE PRACTITIONER

## 2023-07-19 PROCEDURE — 83036 HEMOGLOBIN GLYCOSYLATED A1C: CPT | Performed by: INTERNAL MEDICINE

## 2023-07-19 PROCEDURE — 84146 ASSAY OF PROLACTIN: CPT | Performed by: NURSE PRACTITIONER

## 2023-07-23 ENCOUNTER — PATIENT MESSAGE (OUTPATIENT)
Dept: RHEUMATOLOGY | Facility: CLINIC | Age: 68
End: 2023-07-23
Payer: MEDICARE

## 2023-07-25 ENCOUNTER — TELEPHONE (OUTPATIENT)
Dept: ENDOCRINOLOGY | Facility: CLINIC | Age: 68
End: 2023-07-25
Payer: MEDICARE

## 2023-07-26 ENCOUNTER — PATIENT MESSAGE (OUTPATIENT)
Dept: DIABETES | Facility: CLINIC | Age: 68
End: 2023-07-26
Payer: MEDICARE

## 2023-07-27 RX ORDER — PREDNISONE 10 MG/1
10 TABLET ORAL DAILY
Qty: 90 TABLET | Refills: 4 | Status: SHIPPED | OUTPATIENT
Start: 2023-07-27 | End: 2023-10-25

## 2023-07-27 RX ORDER — PREDNISONE 5 MG/1
5 TABLET ORAL DAILY
Qty: 90 TABLET | Refills: 3 | Status: SHIPPED | OUTPATIENT
Start: 2023-07-27 | End: 2023-10-25

## 2023-08-02 ENCOUNTER — PATIENT MESSAGE (OUTPATIENT)
Dept: ENDOCRINOLOGY | Facility: CLINIC | Age: 68
End: 2023-08-02
Payer: MEDICARE

## 2023-08-02 ENCOUNTER — CLINICAL SUPPORT (OUTPATIENT)
Dept: DIABETES | Facility: CLINIC | Age: 68
End: 2023-08-02
Payer: MEDICARE

## 2023-08-02 DIAGNOSIS — Z79.4 TYPE 2 DIABETES MELLITUS WITH HYPERGLYCEMIA, WITH LONG-TERM CURRENT USE OF INSULIN: Primary | ICD-10-CM

## 2023-08-02 DIAGNOSIS — E11.65 TYPE 2 DIABETES MELLITUS WITH HYPERGLYCEMIA, WITH LONG-TERM CURRENT USE OF INSULIN: Primary | ICD-10-CM

## 2023-08-02 PROCEDURE — 99999 PR PBB SHADOW E&M-EST. PATIENT-LVL III: ICD-10-PCS | Mod: PBBFAC,,,

## 2023-08-02 PROCEDURE — 99999 PR PBB SHADOW E&M-EST. PATIENT-LVL III: CPT | Mod: PBBFAC,,,

## 2023-08-03 NOTE — PROGRESS NOTES
Diabetes Care Specialist Progress Note  Author: Lalitha Stroud RN, CDE  Date: 8/3/2023    Program Intake  Reason for Diabetes Program Visit:: Post Program Follow-Up  Type of Intervention:: Individual  Individual: Education  Education: Self-Management Skill Review, Nutrition and Meal Planning, Pattern Management  Current diabetes risk level:: moderate    Lab Results   Component Value Date    HGBA1C 7.0 (H) 07/19/2023     Diabetes Self-Management Skills Assessment    Diabetes Disease Process/Treatment Options  Diabetes Disease Process/Treatment Options: Skills Assessment Completed: No  Deferred due to:: Time    Nutrition/Healthy Eating  Nutrition/Healthy Eating Skills Assessment Completed:: No  Deffered due to:: Time    Physical Activity/Exercise  Physical Activity/Exercise Skills Assessment Completed: : No  Deffered due to:: Time    Medications  Patient is able to describe current diabetes management routine.: yes  Diabetes management routine:: diet, insulin (pt back to using inPen because of pod expense - is doing well)  Patient is able to identify current diabetes medications, dosages, and appropriate timing of medications.: yes  Patient understands the purpose of the medications taken for diabetes.: yes  Patient reports problems or concerns with current medication regimen.: no  Medication Skills Assessment Completed:: Yes  Assessment indicates:: Adequate understanding  Area of need?: No    Home Blood Glucose Monitoring  Patient states that blood sugar is checked at home daily.: yes  Monitoring Method:: personal continuous glucose monitor  Personal CGM type:: Dexcom G6 - ed on how to upgrade to G7, reviewed sample/demo  Patient is able to use personal CGM appropriately.: yes  CGM Report reviewed?: yes  Home Blood Glucose Monitoring Skills Assessment Completed: : Yes  Assessment indicates:: Adequate understanding  Area of need?: No    Acute Complications  Acute Complications Skills Assessment Completed: : No  Deffered  due to:: Time    Chronic Complications  Chronic Complications Skills Assessment Completed: : No  Deferred due to:: Time    Psychosocial/Coping  Psychosocial/Coping Skills Assessment Completed: : No  Deffered due to:: Time    Diabetes Self Support Plan     Assessment Summary and Plan    Based on today's diabetes care assessment, the following areas of need were identified:      Social 4/26/2022   Access to [employer] No   Cognitive/Behavioral Health No   Culture/Worship No   Communication No   Health Literacy No      Clinical 3/22/2023   Nutritional Status No      Diabetes Self-Management Skills 8/2/2023   Diabetes Disease Process/Treatment Options -   Nutrition/Healthy Eating -   Physical Activity/Exercise -   Medication No   Home Blood Glucose Monitoring No   Acute Complications -   Chronic Complications -   Psychosocial/Coping -   Pt has established care w/ new endocrine provider. [place] preparing to [place] w/ family and will RTC quarterly/as needed. Feels diabetes is in good place now - happy w/ inPen and Dexcom. TIR excellent. All goals met.        Today's interventions were provided through individual discussion, instruction, and written materials were provided.      Patient verbalized understanding of instruction and written materials.  Pt was able to return back demonstration of instructions today. Patient understood key points, needs reinforcement and further instruction.     Diabetes Self-Management Care Plan:    Today's Diabetes Self-Management Care Plan was developed with Nithin's input. Nithin has agreed to work toward the following goal(s) to improve his/her overall diabetes control.      Care Plan: Diabetes Management   Updates made since 7/4/2023 12:00 AM        Problem: Medications         Goal: Pt will prepare for [employer] Patient Assistance Application for novolog.    Start Date: 6/7/2023   Expected End Date: 7/8/2023   This Visit's Progress: On track   Priority: High   Barriers: No  Barriers Identified   Note:    6/7/23 - Pt novolog vials for pump greatly increased in price compared to insulin pens. Reviewed options to help control costs. Sounds like pt will qualify for patient assistance. Est. Care w/ new provider in 2 weeks to help sign off/coordinate PAP. Will follow along for support.        Follow Up Plan     Follow up if symptoms worsen or fail to improve.    Today's care plan and follow up schedule was discussed with patient.  Nithin verbalized understanding of the care plan, goals, and agrees to follow up plan.        The patient was encouraged to communicate with his/her health care provider/physician and care team regarding his/her condition(s) and treatment.  I provided the patient with my contact information today and encouraged to contact me via phone or [facility] Patient Portal as needed.     Length of Visit   Total Time: 60 Minutes

## 2023-08-07 DIAGNOSIS — Z79.4 TYPE 2 DIABETES MELLITUS WITH HYPERGLYCEMIA, WITH LONG-TERM CURRENT USE OF INSULIN: Primary | ICD-10-CM

## 2023-08-07 DIAGNOSIS — E11.65 TYPE 2 DIABETES MELLITUS WITH HYPERGLYCEMIA, WITH LONG-TERM CURRENT USE OF INSULIN: Primary | ICD-10-CM

## 2023-08-07 RX ORDER — BLOOD-GLUCOSE SENSOR
EACH MISCELLANEOUS
Qty: 3 EACH | Refills: 3 | Status: SHIPPED | OUTPATIENT
Start: 2023-08-07 | End: 2023-11-24

## 2023-08-07 RX ORDER — BLOOD-GLUCOSE,RECEIVER,CONT
EACH MISCELLANEOUS
Qty: 1 EACH | Refills: 0 | Status: SHIPPED | OUTPATIENT
Start: 2023-08-07 | End: 2023-11-24

## 2023-08-21 DIAGNOSIS — M06.9 RHEUMATOID ARTHRITIS FLARE: ICD-10-CM

## 2023-08-23 ENCOUNTER — PATIENT MESSAGE (OUTPATIENT)
Dept: ENDOCRINOLOGY | Facility: CLINIC | Age: 68
End: 2023-08-23
Payer: MEDICARE

## 2023-08-23 DIAGNOSIS — E11.65 TYPE 2 DIABETES MELLITUS WITH HYPERGLYCEMIA, WITH LONG-TERM CURRENT USE OF INSULIN: ICD-10-CM

## 2023-08-23 DIAGNOSIS — Z79.4 TYPE 2 DIABETES MELLITUS WITH HYPERGLYCEMIA, WITH LONG-TERM CURRENT USE OF INSULIN: ICD-10-CM

## 2023-08-23 RX ORDER — PREDNISONE 2.5 MG/1
12.5 TABLET ORAL DAILY
Qty: 150 TABLET | Refills: 2 | Status: SHIPPED | OUTPATIENT
Start: 2023-08-23 | End: 2023-11-21

## 2023-08-23 RX ORDER — INSULIN ASPART 100 [IU]/ML
INJECTION, SOLUTION INTRAVENOUS; SUBCUTANEOUS
Qty: 15 ML | Refills: 3 | Status: SHIPPED | OUTPATIENT
Start: 2023-08-23 | End: 2023-09-15 | Stop reason: SDUPTHER

## 2023-08-28 NOTE — PROGRESS NOTES
Subjective:      Chief Complaint: No chief complaint on file.    HPI: Nithin Wagner is a 68 y.o. male who is here for a follow-up evaluation for diabetes. Last seen by me in July 2023    The patient location is: at home  The chief complaint leading to consultation is: diabetes    Visit type: audiovisual    Face to Face time with patient: 30 minutes   45 minutes of total time spent on the encounter, which includes face to face time and non-face to face time preparing to see the patient (eg, review of tests), Obtaining and/or reviewing separately obtained history, Documenting clinical information in the electronic or other health record, Independently interpreting results (not separately reported) and communicating results to the patient/family/caregiver, or Care coordination (not separately reported).    Each patient to whom he or she provides medical services by telemedicine is:  (1) informed of the relationship between the physician and patient and the respective role of any other health care provider with respect to management of the patient; and (2) notified that he or she may decline to receive medical services by telemedicine and may withdraw from such care at any time.    Patient reported in 2007, had heart issues (atrial myxoma), surgery, issues with anesthesia. Then had pain, f/u showed lung issues, then surgery 2008. Later RA, on mtx, other meds. Including steroids.    currently on 15 mg prednisone per day, sometimes 15 mg alternating days     States he has lost around 70 pounds since Jan 2022 -diet and addition of Mounjaro. 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     He is no longer taking Mounjaro due to expense and injection fatigue.     Since his last visit, he has stopped using omnipod-cost prohibitive.    He reports he is eating more and has increased weight by about 15 pounds. He reports he is now on diet. He is on prednisone 20 mg daily.     With  regards to the diabetes:  Diagnosed with T2DM since around 3080-7708. Had symptoms, sugar 300. Metformin initially, other pills, GLP1, then lately insulin.     Known complications:    Retinopathy- Denies; has upcoming appt    Nephropathy- No    Neuropathy- No    CAD? Yes, hx stents. Also HFpEF    CVA- TIA with myxoma, denies CVA in the past     Today, using inpen     Medications  Tresiba 36 units daily  INPEN with novolog cartridges   Average <100 units of novolog daily   I:C 1:8 and states he is sometimes changing 1:15 if lower carb; giving about 70 units of novolog daily  ISF 5  Max      He is giving novolg 8-12 units daily   He is changing needle every time. He is rotating injection sites. He is giving novolog before a meal 60 minutes     Missed doses-denies     Other medications tried:   onglyza-caused HF   metformin. Wasn't helping as much   other meds   Trulicity: 4.5 mg/week. Tuesdays. Stopped in the hospital   Basal insulin: Tresiba 44 units daily   Prandial insulin: Novolog 14 units TIDWM plus scale.     #4 times a day testing  Dexcom   See media tab    Log reviewed: prolonged postprandial hyperglycemia. Blood sugars are very variable.  Hypoglycemic event- denies   Knows how to correct with 15 grams of carbs- juice, coke, or a peppermint.     Dietary recall: He is working on diet.  Eats 3 meals a day.   Snacks: cheese and crackers or sardines   Drinks: caffeine in coffee spikes blood sugar and was drinking 3 pots in the past     Exercise - He is trying to incorporate exercise -walking and using rubber bands     Education - last visit: 6/2023    Has a Medic alert tag- has pendant for falls   Has been having  more pain when walking lately  Glucagon/Baqsimi-     Diabetes Management Status  Statin: Not taking- did not tolerate  ACE/ARB: Taking    Lab Results   Component Value Date    HGBA1C 7.0 (H) 07/19/2023    HGBA1C 7.4 (H) 04/19/2023    HGBA1C 7.5 (H) 03/23/2023     Screening or Prevention Patient's  value Goal Complete/Controlled?   HgA1C Testing and Control   Lab Results   Component Value Date    HGBA1C 7.0 (H) 07/19/2023      Annually/Less than 8% Yes   Lipid profile : 04/19/2023 Annually Yes   LDL control Lab Results   Component Value Date    LDLCALC 106.6 04/19/2023    Annually/Less than 100 mg/dl  No   Nephropathy screening Lab Results   Component Value Date    LABMICR 6.0 04/19/2023     Lab Results   Component Value Date    PROTEINUA Negative 01/26/2023    Annually Yes   Blood pressure BP Readings from Last 1 Encounters:   06/22/23 126/70    Less than 140/90 Yes   Dilated retinal exam : 09/05/2017 Annually Yes   Foot exam   : 05/06/2021 Annually No     With regards to osteoporosis,     Based on FRAX score     He is on ergo 50k weekly and MVI with 1000 IU daily  He has calcium in diet; eating more cheese and dairy     current medication: none    Weight bearing exercise- Walking and also using rubber bands for strength training   Recent falls- stumbles and grabs himself once every 2 to 3 days; actual fall in Feb/March 2023    They have made fall precautions in house   Dental work- denies     No recent fractures   No significant height loss (>2 inches)    tob use -  1 ppd   etoh use-denies      No current diarrhea or h/o malabsorption  + chronic exposure to steroid therapy,  - anticoagulants, proton pump inhibitors or antiseizure medications.     + GERD, indigestion,   Denies gastric bypass surgery.     Denies history of thyroid disease, anemia, kidney stones or kidney disease.     Denies active malignancy, history of malignancy the involved the bone, prior radiation treatment, or unexplained elevations of alk phos on labs     Bone density    7/5/23  FINDINGS:  The bone mineral density measured from L1 through L4 is 1.307g/cm2.  This corresponds to a T score of 0.6 and a Z score of 0.5.  Decrease in bone mineral density by 4%.     The bone mineral density within the left femoral neck measures 0.816 g/cm2.   This corresponds to a T score of -2.0 and a Z score of -1.1.     The bone mineral density within the right femoral neck measures 0.855 g/cm2.  This corresponds to a T score of -1.7 and a Z score of -0.8.  Decrease in bone mineral density by 10%.     FRAX RESULTS:     10-year Probability of Fracture:   Major Osteoporotic Fracture 10.3%.   Hip Fracture 4.1%.   Impression:     Osteopenia both hips with decrease in bone mineral density by 10%.   Latest Reference Range & Units 10/21/22 15:15   Vit D, 25-Hydroxy 30 - 96 ng/mL 20 (L)   (L): Data is abnormally low    With regards to dyslipidemia,   Unable to tolerate statin- allergy  States he has tried several in the past and caused heart arrhythmia   Does not want to try praulent or repatha      Latest Reference Range & Units 03/23/23 08:15 04/19/23 10:02   Cholesterol 120 - 199 mg/dL 183 172   HDL 40 - 75 mg/dL 47 44   HDL/Cholesterol Ratio 20.0 - 50.0 % 25.7 25.6   LDL Cholesterol External 63.0 - 159.0 mg/dL 108.8 106.6   Non-HDL Cholesterol mg/dL 136 128   Total Cholesterol/HDL Ratio 2.0 - 5.0  3.9 3.9   Triglycerides 30 - 150 mg/dL 136 107     With regards to elevated prolactin,    Now normal    Currently pt reports no galactorrhea  + diplopia that recently started around 2 months ago  - chronic headaches    No unexplained weight changes.  + fatigue  He does report some sleep problems. Has pulse ox- decreasing O2. States he has CPAP but unable to tolerate mask. States he has scarring on trachea from intubation in the past    Denies cold intolerance   No nausea or vomiting  + orthostatic symptoms     The patient is not currently using any medication known to cause hyperprolactinemia such as: antipsychotics (risperidone, phenothiazines, haloperidol and butyrophenones),gastric motility drugs (metoclopramide and domperidone), or antihypertensives (methyldopa, reserpine, and verapamil).     Latest Reference Range & Units 06/21/22 07:23   Prolactin 3.5 - 19.4 ng/mL 28.8 (H)       Latest Reference Range & Units 06/21/22 07:23   FSH 0.95 - 11.95 mIU/mL 5.93   LH 0.6 - 12.1 mIU/mL 2.4   Prolactin 3.5 - 19.4 ng/mL 28.8 (H)   Sex Hormone Binding Globulin 22 - 77 nmol/L 79 (H)   Testosterone 250 - 1100 ng/dL 542   Testosterone, Bioavailable 110.0 - 575.0 ng/dL 63.7 (L)   Testosterone, Free 46.0 - 224.0 pg/mL 33.8 (L)   (H): Data is abnormally high  (L): Data is abnormally low  Reviewed past medical, family, social history and updated as appropriate.     Review of Systems   Constitutional:  Positive for fatigue. Negative for unexpected weight change.   Eyes:  Negative for visual disturbance.   Endocrine: Positive for polyphagia. Negative for polydipsia and polyuria.        Hair loss     As above    Objective:     There were no vitals filed for this visit.    Prior vitals:  BP Readings from Last 5 Encounters:   06/22/23 126/70   04/26/23 136/75   03/31/23 (!) 148/89   03/01/23 110/64   02/19/23 130/85     Physical Exam  Vitals reviewed.   Constitutional:       Appearance: He is obese.   Neurological:      Mental Status: He is alert.       Wt Readings from Last 10 Encounters:   06/22/23 1422 98.4 kg (217 lb)   04/26/23 1254 94.5 kg (208 lb 5.4 oz)   04/25/23 1238 94.5 kg (208 lb 5.4 oz)   03/31/23 1551 94.3 kg (208 lb)   03/22/23 1347 94.4 kg (208 lb 1.8 oz)   03/01/23 1441 94 kg (207 lb 3.7 oz)   03/01/23 1404 94.3 kg (208 lb)   02/19/23 1429 91.6 kg (202 lb)   02/06/23 1325 92 kg (202 lb 12.8 oz)   02/01/23 1611 96.3 kg (212 lb 4.9 oz)     Lab Results   Component Value Date    HGBA1C 7.0 (H) 07/19/2023     Lab Results   Component Value Date    CHOL 172 04/19/2023    HDL 44 04/19/2023    LDLCALC 106.6 04/19/2023    TRIG 107 04/19/2023    CHOLHDL 25.6 04/19/2023     Lab Results   Component Value Date     07/19/2023     07/19/2023    K 4.0 07/19/2023    K 4.0 07/19/2023     07/19/2023     07/19/2023    CO2 27 07/19/2023    CO2 27 07/19/2023     (H) 07/19/2023    GLU  143 (H) 07/19/2023    BUN 15 07/19/2023    BUN 15 07/19/2023    CREATININE 0.9 07/19/2023    CREATININE 0.9 07/19/2023    CALCIUM 9.0 07/19/2023    CALCIUM 9.2 07/19/2023    PROT 6.5 07/19/2023    ALBUMIN 3.4 (L) 07/19/2023    BILITOT 0.5 07/19/2023    ALKPHOS 85 07/19/2023    AST 19 07/19/2023    ALT 31 07/19/2023    ANIONGAP 8 07/19/2023    ANIONGAP 8 07/19/2023    ESTGFRAFRICA >60 07/21/2022    EGFRNONAA >60 07/21/2022    TSH 2.405 07/19/2023      Lab Results   Component Value Date    MICALBCREAT 7.6 04/19/2023     Assessment/Plan:     1. Polycythemia, secondary        2. Hyperprolactinemia        3. Type 2 diabetes mellitus with hyperglycemia, with long-term current use of insulin  Comprehensive Metabolic Panel    Hemoglobin A1C    tirzepatide (MOUNJARO) 2.5 mg/0.5 mL PnIj    insulin degludec (TRESIBA FLEXTOUCH U-200) 200 unit/mL (3 mL) insulin pen    IRON AND TIBC      4. Osteoporosis, unspecified osteoporosis type, unspecified pathological fracture presence        5. Class 1 obesity due to excess calories with serious comorbidity and body mass index (BMI) of 34.0 to 34.9 in adult        6. Essential hypertension        7. Vitamin D deficiency  Vitamin D      8. Hyperlipidemia, unspecified hyperlipidemia type        9. Prostate cancer screening  PSA, SCREENING      10. Fatigue, unspecified type          Hyperprolactinemia  Prolactin level now at goal     Type 2 diabetes mellitus with hyperglycemia, with long-term current use of insulin  Reviewed goals of therapy - to get the best control we can without hypoglycemia. Goal <7.5   - last a1c at goal    Check labs in Oct  Log reviewed: some prolonged postprandial hyperglycemia. Blood sugars are variable.  He is insulin stacking likely due to need for more long acting insulin. Encouraged to give fiasp 30 minutes prior to a meal.     Medication Changes   INPEN with Fiasp cartridges   Average <100 units of novolog daily   I:C 1:8   Change ISF 15   Max     with  Tresiba U200 44 units daily  Start Mounjaro 2.5 mg weekly and plan to titrate to max dose tolerated.     Reviewed patient's current insulin regimen. Clarified proper insulin dose and timing in relation to meals, etc. Insulin injection sites and proper rotation instructed.   - reviewed dose adjustments as well.   -Advised frequent self blood glucose monitoring. Patient encouraged to document glucose results and bring them to every clinic visit    -Hypoglycemia precautions discussed. Instructed on precautions before driving.    -Close adherence to lifestyle changes recommended.    -Periodic follow ups for eye evaluations, foot care suggested.  Not on statin due to allergy - consider PCSK9 in future    Osteoporosis   Based on FRAX score    Does want to try osteoporosis medications    Avoid falls    Stop smoking    Continue exercise   Continue vitamin D and calcium in diet    Class 1 obesity due to excess calories with serious comorbidity and body mass index (BMI) of 34.0 to 34.9 in adult    BMI Readings from Last 1 Encounters:   06/22/23 31.14 kg/m²    complicated by HTN, HLD, diabetes   encourage pt to work on healthy diet, increase exercise as tolerated.  Adding mounjaro which can help with weight loss    Essential hypertension  bp controlled  continue regimen  F/u with PCP, monitor      Vitamin D deficiency  Check Vitamin D   Low vitamin D in Oct 2022    Hyperlipidemia  Allergy to statin  Not interested in trying PCSK9  Follow up in about 6 weeks (around 10/12/2023).  Labs this week

## 2023-08-31 ENCOUNTER — PATIENT MESSAGE (OUTPATIENT)
Dept: ENDOCRINOLOGY | Facility: CLINIC | Age: 68
End: 2023-08-31
Payer: MEDICARE

## 2023-08-31 ENCOUNTER — OFFICE VISIT (OUTPATIENT)
Dept: ENDOCRINOLOGY | Facility: CLINIC | Age: 68
End: 2023-08-31
Payer: MEDICARE

## 2023-08-31 ENCOUNTER — PATIENT MESSAGE (OUTPATIENT)
Dept: ENDOCRINOLOGY | Facility: CLINIC | Age: 68
End: 2023-08-31

## 2023-08-31 DIAGNOSIS — Z79.4 TYPE 2 DIABETES MELLITUS WITH HYPERGLYCEMIA, WITH LONG-TERM CURRENT USE OF INSULIN: ICD-10-CM

## 2023-08-31 DIAGNOSIS — E11.65 TYPE 2 DIABETES MELLITUS WITH HYPERGLYCEMIA, WITH LONG-TERM CURRENT USE OF INSULIN: ICD-10-CM

## 2023-08-31 DIAGNOSIS — R53.83 FATIGUE, UNSPECIFIED TYPE: ICD-10-CM

## 2023-08-31 DIAGNOSIS — Z12.5 PROSTATE CANCER SCREENING: ICD-10-CM

## 2023-08-31 DIAGNOSIS — I10 ESSENTIAL HYPERTENSION: ICD-10-CM

## 2023-08-31 DIAGNOSIS — E22.1 HYPERPROLACTINEMIA: Chronic | ICD-10-CM

## 2023-08-31 DIAGNOSIS — D75.1 POLYCYTHEMIA, SECONDARY: Primary | ICD-10-CM

## 2023-08-31 DIAGNOSIS — E78.5 HYPERLIPIDEMIA, UNSPECIFIED HYPERLIPIDEMIA TYPE: ICD-10-CM

## 2023-08-31 DIAGNOSIS — M81.0 OSTEOPOROSIS, UNSPECIFIED OSTEOPOROSIS TYPE, UNSPECIFIED PATHOLOGICAL FRACTURE PRESENCE: ICD-10-CM

## 2023-08-31 DIAGNOSIS — E55.9 VITAMIN D DEFICIENCY: ICD-10-CM

## 2023-08-31 DIAGNOSIS — E66.09 CLASS 1 OBESITY DUE TO EXCESS CALORIES WITH SERIOUS COMORBIDITY AND BODY MASS INDEX (BMI) OF 34.0 TO 34.9 IN ADULT: ICD-10-CM

## 2023-08-31 PROCEDURE — 3061F NEG MICROALBUMINURIA REV: CPT | Mod: CPTII,95,, | Performed by: NURSE PRACTITIONER

## 2023-08-31 PROCEDURE — 3066F PR DOCUMENTATION OF TREATMENT FOR NEPHROPATHY: ICD-10-PCS | Mod: CPTII,95,, | Performed by: NURSE PRACTITIONER

## 2023-08-31 PROCEDURE — 95251 CONT GLUC MNTR ANALYSIS I&R: CPT | Mod: NDTC,S$GLB,, | Performed by: NURSE PRACTITIONER

## 2023-08-31 PROCEDURE — 1159F MED LIST DOCD IN RCRD: CPT | Mod: CPTII,95,, | Performed by: NURSE PRACTITIONER

## 2023-08-31 PROCEDURE — 95251 PR GLUCOSE MONITOR, 72 HOUR, PHYS INTERP: ICD-10-PCS | Mod: NDTC,S$GLB,, | Performed by: NURSE PRACTITIONER

## 2023-08-31 PROCEDURE — 3051F PR MOST RECENT HEMOGLOBIN A1C LEVEL 7.0 - < 8.0%: ICD-10-PCS | Mod: CPTII,95,, | Performed by: NURSE PRACTITIONER

## 2023-08-31 PROCEDURE — 3066F NEPHROPATHY DOC TX: CPT | Mod: CPTII,95,, | Performed by: NURSE PRACTITIONER

## 2023-08-31 PROCEDURE — 3051F HG A1C>EQUAL 7.0%<8.0%: CPT | Mod: CPTII,95,, | Performed by: NURSE PRACTITIONER

## 2023-08-31 PROCEDURE — 1160F RVW MEDS BY RX/DR IN RCRD: CPT | Mod: CPTII,95,, | Performed by: NURSE PRACTITIONER

## 2023-08-31 PROCEDURE — 1160F PR REVIEW ALL MEDS BY PRESCRIBER/CLIN PHARMACIST DOCUMENTED: ICD-10-PCS | Mod: CPTII,95,, | Performed by: NURSE PRACTITIONER

## 2023-08-31 PROCEDURE — 4010F ACE/ARB THERAPY RXD/TAKEN: CPT | Mod: CPTII,95,, | Performed by: NURSE PRACTITIONER

## 2023-08-31 PROCEDURE — 4010F PR ACE/ARB THEARPY RXD/TAKEN: ICD-10-PCS | Mod: CPTII,95,, | Performed by: NURSE PRACTITIONER

## 2023-08-31 PROCEDURE — 99214 OFFICE O/P EST MOD 30 MIN: CPT | Mod: 25,95,, | Performed by: NURSE PRACTITIONER

## 2023-08-31 PROCEDURE — 1159F PR MEDICATION LIST DOCUMENTED IN MEDICAL RECORD: ICD-10-PCS | Mod: CPTII,95,, | Performed by: NURSE PRACTITIONER

## 2023-08-31 PROCEDURE — 3061F PR NEG MICROALBUMINURIA RESULT DOCUMENTED/REVIEW: ICD-10-PCS | Mod: CPTII,95,, | Performed by: NURSE PRACTITIONER

## 2023-08-31 PROCEDURE — 99214 PR OFFICE/OUTPT VISIT, EST, LEVL IV, 30-39 MIN: ICD-10-PCS | Mod: 25,95,, | Performed by: NURSE PRACTITIONER

## 2023-08-31 RX ORDER — INSULIN DEGLUDEC 200 U/ML
44 INJECTION, SOLUTION SUBCUTANEOUS DAILY
Qty: 3 PEN | Refills: 8 | Status: SHIPPED | OUTPATIENT
Start: 2023-08-31 | End: 2023-09-08 | Stop reason: SDUPTHER

## 2023-08-31 RX ORDER — TIRZEPATIDE 2.5 MG/.5ML
2.5 INJECTION, SOLUTION SUBCUTANEOUS
Qty: 4 PEN | Refills: 11 | Status: SHIPPED | OUTPATIENT
Start: 2023-08-31 | End: 2023-10-02

## 2023-08-31 NOTE — ASSESSMENT & PLAN NOTE
Based on FRAX score    Does want to try osteoporosis medications    Avoid falls    Stop smoking    Continue exercise   Continue vitamin D and calcium in diet

## 2023-08-31 NOTE — PATIENT INSTRUCTIONS
Osteoporosis                           Based on FRAX score               Does want to try osteoporosis medications              Avoid falls               Stop smoking               Continue exercise              Continue vitamin D and calcium in diet     Cholesterol               LDL goal less than 70              Consider Praulent in the future if needed   Check LP in future       Diabetes  Check labs in Oct  Log reviewed: some prolonged postprandial hyperglycemia. Blood sugars are variable.  He is insulin stacking likely due to need for more long acting insulin. Encouraged to give fiasp 30 minutes prior to a meal.     Medication Changes   INPEN with Fiasp cartridges   Average <100 units of novolog daily   I:C 1:8   Change ISF 15   Max    with  Tresiba U200 44 units daily  Start Mounjaro 2.5 mg weekly and plan to titrate to max dose tolerated.

## 2023-08-31 NOTE — ASSESSMENT & PLAN NOTE
BMI Readings from Last 1 Encounters:   06/22/23 31.14 kg/m²    complicated by HTN, HLD, diabetes   encourage pt to work on healthy diet, increase exercise as tolerated.  Adding mounjaro which can help with weight loss

## 2023-08-31 NOTE — ASSESSMENT & PLAN NOTE
Reviewed goals of therapy - to get the best control we can without hypoglycemia. Goal <7.5   - last a1c at goal    Check labs in Oct  Log reviewed: some prolonged postprandial hyperglycemia. Blood sugars are variable.  He is insulin stacking likely due to need for more long acting insulin. Encouraged to give fiasp 30 minutes prior to a meal.     Medication Changes   INPEN with Fiasp cartridges   Average <100 units of novolog daily   I:C 1:8   Change ISF 15   Max    with  Tresiba U200 44 units daily  Start Mounjaro 2.5 mg weekly and plan to titrate to max dose tolerated.     Reviewed patient's current insulin regimen. Clarified proper insulin dose and timing in relation to meals, etc. Insulin injection sites and proper rotation instructed.   - reviewed dose adjustments as well.   -Advised frequent self blood glucose monitoring. Patient encouraged to document glucose results and bring them to every clinic visit    -Hypoglycemia precautions discussed. Instructed on precautions before driving.    -Close adherence to lifestyle changes recommended.    -Periodic follow ups for eye evaluations, foot care suggested.  Not on statin due to allergy - consider PCSK9 in future

## 2023-09-08 ENCOUNTER — PATIENT MESSAGE (OUTPATIENT)
Dept: ENDOCRINOLOGY | Facility: CLINIC | Age: 68
End: 2023-09-08
Payer: MEDICARE

## 2023-09-08 DIAGNOSIS — E11.65 TYPE 2 DIABETES MELLITUS WITH HYPERGLYCEMIA, WITH LONG-TERM CURRENT USE OF INSULIN: ICD-10-CM

## 2023-09-08 DIAGNOSIS — Z79.4 TYPE 2 DIABETES MELLITUS WITH HYPERGLYCEMIA, WITH LONG-TERM CURRENT USE OF INSULIN: ICD-10-CM

## 2023-09-08 RX ORDER — INSULIN DEGLUDEC 200 U/ML
48 INJECTION, SOLUTION SUBCUTANEOUS DAILY
Qty: 3 PEN | Refills: 9 | Status: SHIPPED | OUTPATIENT
Start: 2023-09-08 | End: 2023-09-13 | Stop reason: SDUPTHER

## 2023-09-13 DIAGNOSIS — Z79.4 TYPE 2 DIABETES MELLITUS WITH HYPERGLYCEMIA, WITH LONG-TERM CURRENT USE OF INSULIN: ICD-10-CM

## 2023-09-13 DIAGNOSIS — E11.65 TYPE 2 DIABETES MELLITUS WITH HYPERGLYCEMIA, WITH LONG-TERM CURRENT USE OF INSULIN: ICD-10-CM

## 2023-09-13 RX ORDER — INSULIN DEGLUDEC 200 U/ML
48 INJECTION, SOLUTION SUBCUTANEOUS DAILY
Qty: 3 PEN | Refills: 9 | Status: SHIPPED | OUTPATIENT
Start: 2023-09-13 | End: 2023-10-02 | Stop reason: SDUPTHER

## 2023-09-15 ENCOUNTER — PATIENT MESSAGE (OUTPATIENT)
Dept: ENDOCRINOLOGY | Facility: CLINIC | Age: 68
End: 2023-09-15
Payer: MEDICARE

## 2023-09-15 DIAGNOSIS — Z79.4 TYPE 2 DIABETES MELLITUS WITH HYPERGLYCEMIA, WITH LONG-TERM CURRENT USE OF INSULIN: ICD-10-CM

## 2023-09-15 DIAGNOSIS — E11.65 TYPE 2 DIABETES MELLITUS WITH HYPERGLYCEMIA, WITH LONG-TERM CURRENT USE OF INSULIN: ICD-10-CM

## 2023-09-15 RX ORDER — INSULIN ASPART 100 [IU]/ML
INJECTION, SOLUTION INTRAVENOUS; SUBCUTANEOUS
Qty: 15 ML | Refills: 3 | Status: SHIPPED | OUTPATIENT
Start: 2023-09-15 | End: 2023-09-21 | Stop reason: SDUPTHER

## 2023-09-20 ENCOUNTER — PATIENT MESSAGE (OUTPATIENT)
Dept: ENDOCRINOLOGY | Facility: CLINIC | Age: 68
End: 2023-09-20
Payer: MEDICARE

## 2023-09-20 DIAGNOSIS — Z79.4 TYPE 2 DIABETES MELLITUS WITH HYPERGLYCEMIA, WITH LONG-TERM CURRENT USE OF INSULIN: ICD-10-CM

## 2023-09-20 DIAGNOSIS — E11.65 TYPE 2 DIABETES MELLITUS WITH HYPERGLYCEMIA, WITH LONG-TERM CURRENT USE OF INSULIN: ICD-10-CM

## 2023-09-21 ENCOUNTER — HOSPITAL ENCOUNTER (EMERGENCY)
Facility: OTHER | Age: 68
Discharge: HOME | End: 2023-09-21
Attending: EMERGENCY MEDICINE
Payer: MEDICARE

## 2023-09-21 ENCOUNTER — NURSE TRIAGE (OUTPATIENT)
Dept: ADMINISTRATIVE | Facility: CLINIC | Age: 68
End: 2023-09-21
Payer: MEDICARE

## 2023-09-21 ENCOUNTER — TELEPHONE (OUTPATIENT)
Dept: ENDOCRINOLOGY | Facility: CLINIC | Age: 68
End: 2023-09-21
Payer: MEDICARE

## 2023-09-21 VITALS
BODY MASS INDEX: 31.5 KG/M2 | OXYGEN SATURATION: 95 % | SYSTOLIC BLOOD PRESSURE: 161 MMHG | HEIGHT: 70 IN | WEIGHT: 220 LBS | DIASTOLIC BLOOD PRESSURE: 95 MMHG | TEMPERATURE: 98 F | RESPIRATION RATE: 20 BRPM | HEART RATE: 87 BPM

## 2023-09-21 DIAGNOSIS — L98.9 SCALP LESION: ICD-10-CM

## 2023-09-21 DIAGNOSIS — M54.2 NECK PAIN: Primary | ICD-10-CM

## 2023-09-21 DIAGNOSIS — R73.9 HYPERGLYCEMIA: ICD-10-CM

## 2023-09-21 PROBLEM — L03.90 CELLULITIS: Status: RESOLVED | Noted: 2023-01-22 | Resolved: 2023-09-21

## 2023-09-21 PROBLEM — S31.30XA: Status: RESOLVED | Noted: 2023-01-03 | Resolved: 2023-09-21

## 2023-09-21 PROBLEM — T14.90XA TRAUMA: Status: RESOLVED | Noted: 2023-01-22 | Resolved: 2023-09-21

## 2023-09-21 LAB
ALBUMIN SERPL BCP-MCNC: 3.6 G/DL (ref 3.5–5.2)
ALP SERPL-CCNC: 75 U/L (ref 55–135)
ALT SERPL W/O P-5'-P-CCNC: 51 U/L (ref 10–44)
ANION GAP SERPL CALC-SCNC: 10 MMOL/L (ref 8–16)
AST SERPL-CCNC: 25 U/L (ref 10–40)
BASOPHILS # BLD AUTO: 0.03 K/UL (ref 0–0.2)
BASOPHILS NFR BLD: 0.4 % (ref 0–1.9)
BILIRUB SERPL-MCNC: 0.4 MG/DL (ref 0.1–1)
BUN SERPL-MCNC: 28 MG/DL (ref 8–23)
CALCIUM SERPL-MCNC: 9.6 MG/DL (ref 8.7–10.5)
CHLORIDE SERPL-SCNC: 102 MMOL/L (ref 95–110)
CO2 SERPL-SCNC: 25 MMOL/L (ref 23–29)
CREAT SERPL-MCNC: 1.1 MG/DL (ref 0.5–1.4)
CTP QC/QA: YES
DIFFERENTIAL METHOD: ABNORMAL
EOSINOPHIL # BLD AUTO: 0.1 K/UL (ref 0–0.5)
EOSINOPHIL NFR BLD: 1.1 % (ref 0–8)
ERYTHROCYTE [DISTWIDTH] IN BLOOD BY AUTOMATED COUNT: 13.3 % (ref 11.5–14.5)
EST. GFR  (NO RACE VARIABLE): >60 ML/MIN/1.73 M^2
GLUCOSE SERPL-MCNC: 204 MG/DL (ref 70–110)
HCT VFR BLD AUTO: 50.2 % (ref 40–54)
HGB BLD-MCNC: 16.8 G/DL (ref 14–18)
IMM GRANULOCYTES # BLD AUTO: 0.11 K/UL (ref 0–0.04)
IMM GRANULOCYTES NFR BLD AUTO: 1.4 % (ref 0–0.5)
LYMPHOCYTES # BLD AUTO: 1.5 K/UL (ref 1–4.8)
LYMPHOCYTES NFR BLD: 18.5 % (ref 18–48)
MCH RBC QN AUTO: 32.6 PG (ref 27–31)
MCHC RBC AUTO-ENTMCNC: 33.5 G/DL (ref 32–36)
MCV RBC AUTO: 97 FL (ref 82–98)
MONOCYTES # BLD AUTO: 0.6 K/UL (ref 0.3–1)
MONOCYTES NFR BLD: 7.4 % (ref 4–15)
NEUTROPHILS # BLD AUTO: 5.7 K/UL (ref 1.8–7.7)
NEUTROPHILS NFR BLD: 71.2 % (ref 38–73)
NRBC BLD-RTO: 0 /100 WBC
PLATELET # BLD AUTO: 176 K/UL (ref 150–450)
PMV BLD AUTO: 10.9 FL (ref 9.2–12.9)
POCT GLUCOSE: 187 MG/DL (ref 70–110)
POTASSIUM SERPL-SCNC: 4 MMOL/L (ref 3.5–5.1)
PROT SERPL-MCNC: 6.6 G/DL (ref 6–8.4)
RBC # BLD AUTO: 5.16 M/UL (ref 4.6–6.2)
SARS-COV-2 RDRP RESP QL NAA+PROBE: NEGATIVE
SODIUM SERPL-SCNC: 137 MMOL/L (ref 136–145)
WBC # BLD AUTO: 8.06 K/UL (ref 3.9–12.7)

## 2023-09-21 PROCEDURE — 85025 COMPLETE CBC W/AUTO DIFF WBC: CPT | Performed by: EMERGENCY MEDICINE

## 2023-09-21 PROCEDURE — 87635 SARS-COV-2 COVID-19 AMP PRB: CPT | Performed by: EMERGENCY MEDICINE

## 2023-09-21 PROCEDURE — 99284 EMERGENCY DEPT VISIT MOD MDM: CPT

## 2023-09-21 PROCEDURE — 82962 GLUCOSE BLOOD TEST: CPT

## 2023-09-21 PROCEDURE — 25000003 PHARM REV CODE 250: Performed by: EMERGENCY MEDICINE

## 2023-09-21 PROCEDURE — 36415 COLL VENOUS BLD VENIPUNCTURE: CPT | Performed by: EMERGENCY MEDICINE

## 2023-09-21 PROCEDURE — 80053 COMPREHEN METABOLIC PANEL: CPT | Performed by: EMERGENCY MEDICINE

## 2023-09-21 RX ORDER — INSULIN ASPART 100 [IU]/ML
INJECTION, SOLUTION INTRAVENOUS; SUBCUTANEOUS
Qty: 45 ML | Refills: 3 | Status: SHIPPED | OUTPATIENT
Start: 2023-09-21 | End: 2023-11-17

## 2023-09-21 RX ORDER — METOCLOPRAMIDE HYDROCHLORIDE 5 MG/ML
10 INJECTION INTRAMUSCULAR; INTRAVENOUS ONCE AS NEEDED
Status: DISCONTINUED | OUTPATIENT
Start: 2023-09-21 | End: 2023-09-21 | Stop reason: HOSPADM

## 2023-09-21 RX ORDER — LIDOCAINE 50 MG/G
PATCH TOPICAL
Qty: 30 PATCH | Refills: 0 | Status: SHIPPED | OUTPATIENT
Start: 2023-09-21 | End: 2023-11-24

## 2023-09-21 RX ORDER — LIDOCAINE 50 MG/G
1 PATCH TOPICAL
Status: DISCONTINUED | OUTPATIENT
Start: 2023-09-21 | End: 2023-09-21 | Stop reason: HOSPADM

## 2023-09-21 RX ORDER — METHOCARBAMOL 500 MG/1
500 TABLET, FILM COATED ORAL
Status: DISCONTINUED | OUTPATIENT
Start: 2023-09-21 | End: 2023-09-21 | Stop reason: HOSPADM

## 2023-09-21 RX ORDER — ACETAMINOPHEN 325 MG/1
650 TABLET ORAL EVERY 6 HOURS PRN
Qty: 30 TABLET | Refills: 0 | Status: SHIPPED | OUTPATIENT
Start: 2023-09-21 | End: 2023-10-24

## 2023-09-21 RX ORDER — METHOCARBAMOL 500 MG/1
500 TABLET, FILM COATED ORAL 3 TIMES DAILY PRN
Qty: 20 TABLET | Refills: 0 | Status: SHIPPED | OUTPATIENT
Start: 2023-09-21 | End: 2023-09-28

## 2023-09-21 RX ADMIN — LIDOCAINE 1 PATCH: 50 PATCH CUTANEOUS at 12:09

## 2023-09-21 NOTE — DISCHARGE INSTRUCTIONS
Thank you for letting us take care of you today! It was nice meeting you, and I hope you feel better soon.     Call your primary care doctor to make the first available appointment.     Keep all your medical appointments.     Take your regular medication as prescribed. Contact your primary care provider before running out of medication, or for any problems obtaining them.    Do not drive or operate heavy machinery while taking opioid or muscle relaxing medications. Examples include norco, percocet, xanax, valium, flexeril.     Overuse or long term use of pain and sedating medication may lead to addiction, dependence, organ failure, family and work problems, legal problems, accidental overdose, and death.    If you do not have health insurance, you probably can afford it:  Call 1-884.796.8376 ([facility] hotline) or go to www.[employer].[place].gov    Your evaluation in the ER does not suggest any emergent or life threatening medical condition requiring admission or immediate intervention beyond that provided in the ER.   However, the signs of a serious problem sometimes take more time to appear.     Do not hesitate to return to the ER if any of the following occur:    Weakness, dizziness, fainting, or loss of consciousness   Fever of 100.4ºF (38ºC) or higher  Any worse symptoms  Any new or concerning symptoms

## 2023-09-21 NOTE — ED PROVIDER NOTES
"  Source of History:  Medical record, patient      Chief complaint:  Per triage note: "Headache and Neck Pain (Diaphoretic on arrival. Reports pain to the R side of head radiating down to neck, pain with neck movement. Pt is scheduled to see a dermatologist in January for possible skin cancer to r side of head. )  "    HPI:    Patient presents with right parietal headache. Onset one week ago, associated with a skin growth to the right upper forehead which has developed and been painful for the past month. Location is across the right side of the head and extended to the neck over the past two days. His pain is managed with hydrocodone 10 mg at home. No associated fever, vision changes, numbness, or paresthesias. This is the extent of the patient's complaints at this time.    ROS:   See HPI for pertinent Review of Systems      Review of patient's allergies indicates:   Allergen Reactions    Enbrel [etanercept] Shortness Of Breath     CHF    Nsaids (non-steroidal anti-inflammatory drug) Other (See Comments)     hypertention  Other reaction(s): Other (See Comments)  hypertention  HTN    Statins-hmg-coa reductase inhibitors Other (See Comments)     Heart arrythemias  Other reaction(s): Other (See Comments)  Heart arrythemias  Joint pain and cardiac arrythmias    Pcn [penicillins] Rash       PMH:  As per HPI and below:  Past Medical History:   Diagnosis Date    Arthritis     Atrial myxoma     CHF (congestive heart failure)     Coronary atherosclerosis     Diabetes mellitus, type 2     Difficult intubation     Hepatitis B     Hyperlipidemia     Hypertension     Non-alcoholic fatty liver disease     Rheumatoid arthritis     Rheumatoid arthritis flare 07/12/2021    Stroke     TIA    Systolic heart failure        Past Surgical History:   Procedure Laterality Date    CORONARY ANGIOGRAPHY N/A 3/10/2021    Procedure: ANGIOGRAM, CORONARY ARTERY - right radial;  Surgeon: Shemar Dempsey MD;  Location: [facility], [place] CATH LAB;  Service: " "Cardiology;  Laterality: N/A;    CORONARY STENT PLACEMENT  03/10/2021    prox-mid RCA [place] 4.5 x 26 mm, 4.5 x 12 mm    INCISION AND DRAINAGE OF SCROTUM N/A 11/15/2022    Procedure: INCISION AND DRAINAGE, SCROTUM;  Surgeon: William Hatch MD;  Location: [facility], [place] OR;  Service: Urology;  Laterality: N/A;    INCISION AND DRAINAGE OF SCROTUM N/A 11/17/2022    Procedure: INCISION AND DRAINAGE, SCROTUM;  Surgeon: William Hatch MD;  Location: [facility], [place] OR;  Service: Urology;  Laterality: N/A;    LUNG LOBECTOMY Right 2008    RUL lobectomy after removal of atrial myxoma    PLEURA BIOPSY      RESECTION OF ATRIAL MYXOMA  2007       Social History     Tobacco Use    Smoking status: Every Day     Current packs/day: 1.00     Average packs/day: 1 pack/day for 35.0 years (35.0 ttl pk-yrs)     Types: Cigarettes    Smokeless tobacco: Never   Substance Use Topics    Alcohol use: Not Currently    Drug use: No       Physical Exam:      BP (!) 161/95   Pulse 87   Temp 97.9 °F (36.6 °C) (Oral)   Resp 20   Ht 5' 10" (1.778 m)   Wt 99.8 kg (220 lb)   SpO2 95%   BMI 31.57 kg/m²   Nursing note and vitals reviewed.  Constitutional: AAOx3. Well-developed and well-nourished. No distress.  Eyes: PERRL. EOMI. No scleral icterus. No discharge.   HENT:  Right forehead scalp lesion.  No abrasions, contusions, or lacerations on close inspection.  Neck: Normal range of motion. Neck supple.  No midline spinal tenderness, step-offs, or deformities.  Cardiovascular: Normal rate.  No murmurs, rubs, or gallops.  Pulmonary/Chest: Effort normal.  Clear to auscultation.  Abdominal: Soft. No distension.  Musculoskeletal: Normal range of motion. No midline spinal tenderness, step-offs, or deformities.    Neurological: GCS15. Alert and oriented to person, place, and time. No gross cranial nerve deficit. Coordination normal. No light touch deficits. Able to do deep knee bend, stand on heels and toes. Normal gait.    Skin: Skin is warm and dry.   Psychiatric: " "Behavior is normal. Judgment normal      Medical Decision Making / Independent Interpretations / External Records Reviewed:      Patient is a 68-year-old male with diabetes, rheumatoid arthritis, CHF, history of atrial myxoma, history of stroke, right scalp skin lesion who presents with headache for last 2 days with headache, gradual in onset, radiating to his right neck. Denies thunderclap quality. No neck stiffness. Denies history of head or neck trauma. Denies onset with exertion. No photophobia. Denies chest pain, dyspnea, fevers, chills, vision changes, motor or sensory deficits.   Neurologically intact, physical exam otherwise nonfocal.   Ddx includes migraine HA, tension headache, other primary headache, status migrainus. I doubt SAH, other intracranial bleed and meningoencephalopathy by my history and physical.         ED Course as of 09/21/23 1653   Thu Sep 21, 2023   1208 Patient now reports that his headache is really just scalp pain around the site of his skin lesion, and that he has tension-like neck pain. He doubts this is a tension headache per se because "I haven't had a tension headache in 15 years. If I get headaches, they're ocular migraines."  Will trial antispasmodics. If pt's pain is resolved with initial treatment, it would suggest local scalp pain and musculoskeletal neck pain, and significantly reduce the pretest probability of intracranial etiology.  [RC]   1305 Patient reports resolution of his headache.  After discussion of the risks and benefits of CT, we will defer CT as I doubt any severe intracranial cause.  --  I discussed with pt and/or guardian/caretaker that this evaluation in the ED does not suggest any emergent or life threatening medical condition requiring admission or further immediate intervention or diagnostics. Regardless, an unremarkable evaluation in the ED does not preclude the development or presence of a serious or life threatening condition. Pt was instructed to " return for any worsening, new, changed, or concerning symptoms.     I had a detailed discussion with patient and/or guardian/caretaker regarding findings, plan, return precautions, importance of medication adherence, need to follow-up with a PCP and specialist. All questions answered. I placed case management referral order to facilitate f/u with neurology and derm.     Note was created using voice recognition software. It may have occasional typographical errors not identified and edited despite initial review prior to signing.   [RC]      ED Course User Index  [RC] Vikram Sahni MD       I decided to obtain the patient's medical records. I reviewed patient's prior external notes / results: specialist note   .         Medications - No data to display           Diagnostic Impression:    1. Neck pain    2. Scalp lesion    3. Hyperglycemia         ED Disposition Condition    Discharge Good          Future Appointments   Date Time Provider Department Center   9/29/2023  8:30 AM LAB, [facility]   10/2/2023  8:30 AM Izabel Marc NP [facility] [address]   10/9/2023  8:30 AM Mohinder Samuel, OD [facility] [place]   10/24/2023  2:00 PM Shemar Dempsey MD [id number]   1/18/2024  9:15 AM Jarred Gibbs MD [facility]      ED Prescriptions       Medication Sig Dispense Start Date End Date Auth. Provider    acetaminophen (TYLENOL) 325 MG tablet Take 2 tablets (650 mg total) by mouth every 6 (six) hours as needed for Pain or Temperature greater than (100.3). 30 tablet 9/21/2023 -- Vikram Sahni MD    methocarbamoL (ROBAXIN) 500 MG Tab Take 1 tablet (500 mg total) by mouth 3 (three) times daily as needed (Muscle spasm pain). 20 tablet 9/21/2023 9/28/2023 Vikram Sahni MD    LIDOcaine (LIDODERM) 5 % Apply to affected area as needed for pain for 12 hours, then off for 12 hours. Discard after each use 30 patch 9/21/2023 -- Vikram Sahni MD          Follow-up  Information       Follow up With Specialties Details Why Contact Info Additional Information    Tushar Drew MD Internal Medicine Schedule an appointment as soon as possible for a visit  For recheck with your primary care doctor [address] [facility] [address]  [phone]       [facility] - Neurology Neurology Schedule an appointment as soon as possible for a visit  For recheck with specialist [address] [facility], [address]  [facility] [address] [phone] Neurology - [facility], 5th Floor, [address] Please park in [facility] [place] and use [address] elevators    [facility] - Dermatology [address] [place] [address] Dermatology In 1 week For recheck of your lesion with specialist [address] [facility] [address] [phone] Dermatology - [facility], Clinic 11th Floor Please park in [facility]. Use Clinic elevators 12 & 13 to get to the 11th floor            ---  I, Lizy Sheriff, scribed for, and in the presence of, Dr. Sahni. I performed the scribed service and the documentation accurately describes the services I performed. I attest to the accuracy of the note.     Physician Attestation for Scribe:   I, Vikram Sahni MD, reviewed documentation as scribed in my presence, which is both accurate and complete.       Vikram Sahni MD  09/21/23 [id number]

## 2023-09-21 NOTE — TELEPHONE ENCOUNTER
Reason for Disposition   Tender node in the neck and also has a sore throat with minimal/no runny nose or cough   Patient sounds very sick or weak to the triager    Additional Information   Negative: Shock suspected (e.g., cold/pale/clammy skin, too weak to stand, low BP, rapid pulse)   Negative: Similar pain previously and it was from 'heart attack'   Negative: Similar pain previously from 'angina' and not relieved by nitroglycerin   Negative: Difficult to awaken or acting confused (e.g., disoriented, slurred speech)   Negative: Sounds like a life-threatening emergency to the triager   Negative: Followed an injury to neck (e.g., MVA, sports, impact or collision)   Negative: Chest pain   Lymph node in the neck is swollen or painful to the touch   Negative: Sore throat is main symptom and has swollen node in the neck that is < 1 inch (2.5 cm) in size     Not main symptom   Negative: Sounds like a life-threatening emergency to the triager   Negative: Node is in the neck and causes difficulty breathing   Negative: Patient sounds very sick or weak to the triager   Negative: Node is in the neck and can't swallow fluids   Negative: Fever > 103 F (39.4 C)   Negative: Lump or swelling in groin and pulsating (like heartbeat)   Negative: Single large node and size > 1 inch (2.5 cm)   Negative: Overlying skin is red   Negative: Rapid increase in size of node over several hours   Negative: Tender node in the groin and has a sore, scratch, cut, or painful red area on that leg   Negative: Tender node in the armpit and has a sore, scratch, cut, or painful red area on that arm   Negative: Difficulty breathing or unusual sweating (e.g., sweating without exertion)   Negative: Chest pain lasting longer than 5 minutes   Negative: Stiff neck (can't touch chin to chest) and has headache   Negative: Stiff neck (can't touch chin to chest) and fever   Negative: Weakness of an arm or hand   Negative: Problems with bowel or bladder  control    Protocols used: Neck Pain or Fzabukrnt-M-VO, Lymph Nodes - Sbzynmp-V-MS  Pt states he has a module on his head and his PCP thinks it may be cancer. States he was told to see a dermatologist, but no appointments are available until January. Pt states he now has new onset stuffiness in his neck and he cannot turn his head. States he also has sweats and swollen lymph nodes in his neck. Pt states she takes Tylenol around the clock so he will not know if he has fever. Pt advised per Triage protocol to go to the ED for evaluation. He verbalized understanding.

## 2023-09-22 ENCOUNTER — PATIENT OUTREACH (OUTPATIENT)
Dept: EMERGENCY MEDICINE | Facility: OTHER | Age: 68
End: 2023-09-22
Payer: MEDICARE

## 2023-09-22 NOTE — PROGRESS NOTES
Patient was in ED on 9/21/23 for hyperglycemia. He has endocrinologist F/U scheduled with Izabel Marc NP on 10/2/23 at 8:30. Will remind pt of appt.

## 2023-09-26 ENCOUNTER — TELEPHONE (OUTPATIENT)
Dept: DERMATOLOGY | Facility: CLINIC | Age: 68
End: 2023-09-26
Payer: MEDICARE

## 2023-09-26 NOTE — TELEPHONE ENCOUNTER
----- Message from Kim Wray sent at 9/26/2023 10:30 AM CDT -----  Contact: [phone]  The patient is calling from a missed call and would like to speak with the staff.  the patient would like a call back at your earliest convince.  The pt can be reached at [phone]

## 2023-09-29 ENCOUNTER — PATIENT OUTREACH (OUTPATIENT)
Dept: EMERGENCY MEDICINE | Facility: OTHER | Age: 68
End: 2023-09-29
Payer: MEDICARE

## 2023-09-29 ENCOUNTER — LAB VISIT (OUTPATIENT)
Dept: LAB | Facility: OTHER | Age: 68
End: 2023-09-29
Attending: NURSE PRACTITIONER
Payer: MEDICARE

## 2023-09-29 DIAGNOSIS — Z79.4 TYPE 2 DIABETES MELLITUS WITH HYPERGLYCEMIA, WITH LONG-TERM CURRENT USE OF INSULIN: ICD-10-CM

## 2023-09-29 DIAGNOSIS — Z12.5 PROSTATE CANCER SCREENING: ICD-10-CM

## 2023-09-29 DIAGNOSIS — E11.65 TYPE 2 DIABETES MELLITUS WITH HYPERGLYCEMIA, WITH LONG-TERM CURRENT USE OF INSULIN: ICD-10-CM

## 2023-09-29 DIAGNOSIS — E55.9 VITAMIN D DEFICIENCY: ICD-10-CM

## 2023-09-29 LAB
25(OH)D3+25(OH)D2 SERPL-MCNC: 30 NG/ML (ref 30–96)
ALBUMIN SERPL BCP-MCNC: 3.8 G/DL (ref 3.5–5.2)
ALP SERPL-CCNC: 76 U/L (ref 55–135)
ALT SERPL W/O P-5'-P-CCNC: 89 U/L (ref 10–44)
ANION GAP SERPL CALC-SCNC: 12 MMOL/L (ref 8–16)
AST SERPL-CCNC: 39 U/L (ref 10–40)
BILIRUB SERPL-MCNC: 0.5 MG/DL (ref 0.1–1)
BUN SERPL-MCNC: 19 MG/DL (ref 8–23)
CALCIUM SERPL-MCNC: 9.3 MG/DL (ref 8.7–10.5)
CHLORIDE SERPL-SCNC: 102 MMOL/L (ref 95–110)
CO2 SERPL-SCNC: 25 MMOL/L (ref 23–29)
COMPLEXED PSA SERPL-MCNC: 0.23 NG/ML (ref 0–4)
CREAT SERPL-MCNC: 1 MG/DL (ref 0.5–1.4)
EST. GFR  (NO RACE VARIABLE): >60 ML/MIN/1.73 M^2
ESTIMATED AVG GLUCOSE: 157 MG/DL (ref 68–131)
GLUCOSE SERPL-MCNC: 98 MG/DL (ref 70–110)
HBA1C MFR BLD: 7.1 % (ref 4–5.6)
IRON SERPL-MCNC: 149 UG/DL (ref 45–160)
POTASSIUM SERPL-SCNC: 4 MMOL/L (ref 3.5–5.1)
PROT SERPL-MCNC: 7.3 G/DL (ref 6–8.4)
SATURATED IRON: 38 % (ref 20–50)
SODIUM SERPL-SCNC: 139 MMOL/L (ref 136–145)
TOTAL IRON BINDING CAPACITY: 389 UG/DL (ref 250–450)
TRANSFERRIN SERPL-MCNC: 263 MG/DL (ref 200–375)

## 2023-09-29 PROCEDURE — 36415 COLL VENOUS BLD VENIPUNCTURE: CPT | Performed by: NURSE PRACTITIONER

## 2023-09-29 PROCEDURE — 80053 COMPREHEN METABOLIC PANEL: CPT | Performed by: NURSE PRACTITIONER

## 2023-09-29 PROCEDURE — 84153 ASSAY OF PSA TOTAL: CPT | Performed by: NURSE PRACTITIONER

## 2023-09-29 PROCEDURE — 83036 HEMOGLOBIN GLYCOSYLATED A1C: CPT | Performed by: NURSE PRACTITIONER

## 2023-09-29 PROCEDURE — 84466 ASSAY OF TRANSFERRIN: CPT | Performed by: NURSE PRACTITIONER

## 2023-09-29 PROCEDURE — 83540 ASSAY OF IRON: CPT | Performed by: NURSE PRACTITIONER

## 2023-09-29 PROCEDURE — 82306 VITAMIN D 25 HYDROXY: CPT | Performed by: NURSE PRACTITIONER

## 2023-09-29 NOTE — PROGRESS NOTES
ED Navigator reminded the patient of scheduled appointment with Izabel Marc NP on 10/2/23 at 8:30. Patient instructed to bring a photo I.D., health insurance card, and a list of current medications. Patient agreed to appointment date and time. Patient declined additional services. Follow up encounter closed.

## 2023-09-29 NOTE — PROGRESS NOTES
Subjective:      Chief Complaint: No chief complaint on file.    HPI: Nithin Wagner is a 68 y.o. male who is here for a follow-up evaluation for diabetes. Last seen by me in Aug 2023    The patient location is: at home  The chief complaint leading to consultation is: diabetes    Visit type: audiovisual    Face to Face time with patient: 25 minutes   40 minutes of total time spent on the encounter, which includes face to face time and non-face to face time preparing to see the patient (eg, review of tests), Obtaining and/or reviewing separately obtained history, Documenting clinical information in the electronic or other health record, Independently interpreting results (not separately reported) and communicating results to the patient/family/caregiver, or Care coordination (not separately reported).    Each patient to whom he or she provides medical services by telemedicine is:  (1) informed of the relationship between the physician and patient and the respective role of any other health care provider with respect to management of the patient; and (2) notified that he or she may decline to receive medical services by telemedicine and may withdraw from such care at any time.    Patient reported in 2007, had heart issues (atrial myxoma), surgery, issues with anesthesia. Then had pain, f/u showed lung issues, then surgery 2008. Later RA, on mtx, other meds. Including steroids.    currently on 15 mg prednisone per day, sometimes 15 mg alternating days     States he has lost around 70 pounds since Jan 2022 -diet and addition of Mounjaro. 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     He has stopped using omnipod-cost prohibitive.  At his last visit, we restarted Mounjaro and increased Tresiba. Today, he reports he was unable to afford Mounjaro. Has been on Ozempic in the past and had nausea but is interested in today.     Since his last visit, he was dx with skin cancer. He  will have surgery tomorrow.     Does report he is on prednisone 30 mg daily and would like to wean. States ID wants him to stay <20. He has not seen rheumatology in awhile as his MD retired. He reports when he tries to wean his prednisone he has chronic fatigue and unable to get out of bed.     He is taking prednisone 3AM 15 mg; 9AM 2.5 mg; 3PM takes 5 mg.     Since his last visit, he noticed coffeemate increases his blood sugar by 50-75 points. He has started making his own additives for coffee and has seen better blood sugar.     States he puts on omnipod when he has a lot of MD visits or physical activity -has occurred twice in the past 2 months.     With regards to the diabetes:  Diagnosed with T2DM since around 4454-1611. Had symptoms, sugar 300. Metformin initially, other pills, GLP1, then lately insulin.     Known complications:    Retinopathy- Denies; has upcoming appt    Nephropathy- No    Neuropathy- No    CAD? Yes, hx stents. Also HFpEF    CVA- TIA with myxoma, denies CVA in the past     Today, using inpen     Medications  Tresiba 48 units daily  INPEN with novolog cartridges   Average <100 units of novolog daily   I:C 1:8 and states he is sometimes changing 1:15 if lower carb; giving about 70 units of novolog daily  ISF 5  Max      He is giving novolg 8-15 times daily   He is changing needle every time. He is rotating injection sites. He is giving novolog before a meal 45 minutes     Missed doses-denies     Other medications tried:   onglyza-caused HF   metformin. Wasn't helping as much   other meds   Trulicity: 4.5 mg/week. Tuesdays. Stopped in the hospital   Basal insulin: Tresiba 44 units daily   Prandial insulin: Novolog 14 units TIDWM plus scale.     #4 times a day testing  Dexcom   See media tab    Log reviewed: prolonged postprandial hyperglycemia. Blood sugars are very variable.  Hypoglycemic event- some when he is giving extra novolog for a dose of prednisone   Knows how to correct with  15 grams of carbs- juice, coke, or a peppermint.     Dietary recall: He is working on diet.  Eats 3 meals a day.   Snacks: cheese and crackers or sardines   Drinks: caffeine in coffee spikes blood sugar and was drinking 3 pots in the past     Exercise - He is trying to incorporate exercise -walking and using rubber bands     Education - last visit: 6/2023    Has a Medic alert tag- has pendant for falls   Has been having  more pain when walking lately  Glucagon/Baqsimi- does not want    Diabetes Management Status  Statin: Not taking- did not tolerate  ACE/ARB: Taking    Lab Results   Component Value Date    HGBA1C 7.1 (H) 09/29/2023    HGBA1C 7.0 (H) 07/19/2023    HGBA1C 7.4 (H) 04/19/2023     Screening or Prevention Patient's value Goal Complete/Controlled?   HgA1C Testing and Control   Lab Results   Component Value Date    HGBA1C 7.1 (H) 09/29/2023      Annually/Less than 8% Yes   Lipid profile : 04/19/2023 Annually Yes   LDL control Lab Results   Component Value Date    LDLCALC 106.6 04/19/2023    Annually/Less than 100 mg/dl  No   Nephropathy screening Lab Results   Component Value Date    LABMICR 6.0 04/19/2023     Lab Results   Component Value Date    PROTEINUA Negative 01/26/2023    Annually Yes   Blood pressure BP Readings from Last 1 Encounters:   09/21/23 (!) 161/95    Less than 140/90 Yes   Dilated retinal exam : 09/05/2017 Annually Yes   Foot exam   : 05/06/2021 Annually No     He is seeing hepatology      Latest Reference Range & Units 09/21/23 11:54 09/29/23 15:01   AST 10 - 40 U/L 25 39   ALT 10 - 44 U/L 51 (H) 89 (H)   (H): Data is abnormally high    With regards to osteoporosis,     Based on FRAX score     He is on ergo 50k weekly and MVI with 1000 IU daily  He has calcium in diet; eating more cheese and dairy     current medication: none and does not want treatment     Weight bearing exercise- Walking and also using rubber bands for strength training   Recent falls- stumbles and grabs himself once  every 2 to 3 days; actual fall in Feb/March 2023    They have made fall precautions in house   Dental work- denies     No recent fractures   No significant height loss (>2 inches)    tob use -  1 ppd   etoh use-denies      No current diarrhea or h/o malabsorption  + chronic exposure to steroid therapy,  - anticoagulants, proton pump inhibitors or antiseizure medications.     + GERD, indigestion,   Denies gastric bypass surgery.     Denies history of thyroid disease, anemia, kidney stones or kidney disease.     Denies active malignancy, history of malignancy the involved the bone, prior radiation treatment, or unexplained elevations of alk phos on labs     Bone density    7/5/23  FINDINGS:  The bone mineral density measured from L1 through L4 is 1.307g/cm2.  This corresponds to a T score of 0.6 and a Z score of 0.5.  Decrease in bone mineral density by 4%.     The bone mineral density within the left femoral neck measures 0.816 g/cm2.  This corresponds to a T score of -2.0 and a Z score of -1.1.     The bone mineral density within the right femoral neck measures 0.855 g/cm2.  This corresponds to a T score of -1.7 and a Z score of -0.8.  Decrease in bone mineral density by 10%.     FRAX RESULTS:     10-year Probability of Fracture:   Major Osteoporotic Fracture 10.3%.   Hip Fracture 4.1%.   Impression:     Osteopenia both hips with decrease in bone mineral density by 10%.   Latest Reference Range & Units 10/21/22 15:15   Vit D, 25-Hydroxy 30 - 96 ng/mL 20 (L)   (L): Data is abnormally low    With regards to dyslipidemia,   Unable to tolerate statin- allergy  States he has tried several in the past and caused heart arrhythmia   Does not want to try praulent or repatha      Latest Reference Range & Units 03/23/23 08:15 04/19/23 10:02   Cholesterol 120 - 199 mg/dL 183 172   HDL 40 - 75 mg/dL 47 44   HDL/Cholesterol Ratio 20.0 - 50.0 % 25.7 25.6   LDL Cholesterol External 63.0 - 159.0 mg/dL 108.8 106.6   Non-HDL  Cholesterol mg/dL 136 128   Total Cholesterol/HDL Ratio 2.0 - 5.0  3.9 3.9   Triglycerides 30 - 150 mg/dL 136 107     With regards to elevated prolactin-now normal,    Now normal    Currently pt reports no galactorrhea  + diplopia that recently started around 2 months ago  - chronic headaches    No unexplained weight changes.  + fatigue  He does report some sleep problems. Has pulse ox- decreasing O2. States he has CPAP but unable to tolerate mask. States he has scarring on trachea from intubation in the past    Denies cold intolerance   No nausea or vomiting  + orthostatic symptoms     The patient is not currently using any medication known to cause hyperprolactinemia such as: antipsychotics (risperidone, phenothiazines, haloperidol and butyrophenones),gastric motility drugs (metoclopramide and domperidone), or antihypertensives (methyldopa, reserpine, and verapamil).     Latest Reference Range & Units 06/21/22 07:23   Prolactin 3.5 - 19.4 ng/mL 28.8 (H)      Latest Reference Range & Units 06/21/22 07:23   FSH 0.95 - 11.95 mIU/mL 5.93   LH 0.6 - 12.1 mIU/mL 2.4   Prolactin 3.5 - 19.4 ng/mL 28.8 (H)   Sex Hormone Binding Globulin 22 - 77 nmol/L 79 (H)   Testosterone 250 - 1100 ng/dL 542   Testosterone, Bioavailable 110.0 - 575.0 ng/dL 63.7 (L)   Testosterone, Free 46.0 - 224.0 pg/mL 33.8 (L)      Latest Reference Range & Units 06/21/22 07:23 07/19/23 09:17   Prolactin 3.5 - 19.4 ng/mL 28.8 (H) 19.0   (H): Data is abnormally high    Reviewed past medical, family, social history and updated as appropriate.     Review of Systems   Constitutional:  Positive for fatigue. Negative for unexpected weight change.   Eyes:  Negative for visual disturbance.   Endocrine: Positive for polyphagia. Negative for polydipsia and polyuria.        Hair loss     As above    Objective:     There were no vitals filed for this visit.    Prior vitals:  BP Readings from Last 5 Encounters:   09/21/23 (!) 161/95   06/22/23 126/70   04/26/23  136/75   03/31/23 (!) 148/89   03/01/23 110/64     Physical Exam  Vitals reviewed.   Constitutional:       Appearance: He is obese.   Neurological:      Mental Status: He is alert.       Wt Readings from Last 10 Encounters:   09/21/23 1052 99.8 kg (220 lb)   06/22/23 1422 98.4 kg (217 lb)   04/26/23 1254 94.5 kg (208 lb 5.4 oz)   04/25/23 1238 94.5 kg (208 lb 5.4 oz)   03/31/23 1551 94.3 kg (208 lb)   03/22/23 1347 94.4 kg (208 lb 1.8 oz)   03/01/23 1441 94 kg (207 lb 3.7 oz)   03/01/23 1404 94.3 kg (208 lb)   02/19/23 1429 91.6 kg (202 lb)   02/06/23 1325 92 kg (202 lb 12.8 oz)     Lab Results   Component Value Date    HGBA1C 7.1 (H) 09/29/2023     Lab Results   Component Value Date    CHOL 172 04/19/2023    HDL 44 04/19/2023    LDLCALC 106.6 04/19/2023    TRIG 107 04/19/2023    CHOLHDL 25.6 04/19/2023     Lab Results   Component Value Date     09/29/2023    K 4.0 09/29/2023     09/29/2023    CO2 25 09/29/2023    GLU 98 09/29/2023    BUN 19 09/29/2023    CREATININE 1.0 09/29/2023    CALCIUM 9.3 09/29/2023    PROT 7.3 09/29/2023    ALBUMIN 3.8 09/29/2023    BILITOT 0.5 09/29/2023    ALKPHOS 76 09/29/2023    AST 39 09/29/2023    ALT 89 (H) 09/29/2023    ANIONGAP 12 09/29/2023    ESTGFRAFRICA >60 07/21/2022    EGFRNONAA >60 07/21/2022    TSH 2.405 07/19/2023      Lab Results   Component Value Date    MICALBCREAT 7.6 04/19/2023     Assessment/Plan:     1. Type 2 diabetes mellitus with hyperglycemia, with long-term current use of insulin  insulin degludec (TRESIBA FLEXTOUCH U-200) 200 unit/mL (3 mL) insulin pen      2. Osteoporosis, unspecified osteoporosis type, unspecified pathological fracture presence        3. Class 1 obesity due to excess calories with serious comorbidity and body mass index (BMI) of 34.0 to 34.9 in adult        4. Essential hypertension        5. Hyperlipidemia, unspecified hyperlipidemia type        6. Vitamin D deficiency        7. Hyperprolactinemia        8. Long term (current)  use of systemic steroids            Type 2 diabetes mellitus with hyperglycemia, with long-term current use of insulin  Reviewed goals of therapy - to get the best control we can without hypoglycemia. Goal <7.5   - last a1c at goal    Check labs in Oct  Log reviewed: some prolonged postprandial hyperglycemia. Blood sugars are variable.  He is insulin stacking likely due to high dose steroids. Encouraged to give fiasp 30 minutes prior to a meal.     Medication Changes   INPEN with Fiasp cartridges   Average <100 units of novolog daily   I:C 1:8   Change ISF 15   Max    With Increase Tresiba U200 56 units daily    Reviewed patient's current insulin regimen. Clarified proper insulin dose and timing in relation to meals, etc. Insulin injection sites and proper rotation instructed.   - reviewed dose adjustments as well.   -Advised frequent self blood glucose monitoring. Patient encouraged to document glucose results and bring them to every clinic visit    -Hypoglycemia precautions discussed. Instructed on precautions before driving.    -Close adherence to lifestyle changes recommended.    -Periodic follow ups for eye evaluations, foot care suggested.  Not on statin due to allergy - consider PCSK9 in future    Osteoporosis   Based on FRAX score    Does want to try osteoporosis medications    Avoid falls    Stop smoking    Continue exercise   Continue vitamin D and calcium in diet    Class 1 obesity due to excess calories with serious comorbidity and body mass index (BMI) of 34.0 to 34.9 in adult    BMI Readings from Last 1 Encounters:   09/21/23 31.57 kg/m²    complicated by HTN, HLD, diabetes   encourage pt to work on healthy diet, increase exercise as tolerated.  Unable to afford Mounjaro    Essential hypertension  bp controlled  continue regimen  F/u with PCP, monitor      Hyperlipidemia  Allergy to statin  Not interested in trying PCSK9    Vitamin D deficiency  Check Vitamin D   Low vitamin D in Oct  2022    Hyperprolactinemia  Now normal    Long term (current) use of systemic steroids   He is currently on   He is taking prednisone 3AM 15 mg; 9AM 2.5 mg; 3PM takes 5 mg.     Will try to wean to   He is taking prednisone 3AM 15 mg; 9AM 2.5 mg; 3PM take 2.5 mg. If you do well on this dose for a couple of days, decrease 3AM to 12.5 mg at 3AM.    Follow up in about 6 weeks (around 11/13/2023).  Labs this week

## 2023-10-01 ENCOUNTER — PATIENT MESSAGE (OUTPATIENT)
Dept: ENDOCRINOLOGY | Facility: CLINIC | Age: 68
End: 2023-10-01
Payer: MEDICARE

## 2023-10-02 ENCOUNTER — PATIENT MESSAGE (OUTPATIENT)
Dept: DIABETES | Facility: CLINIC | Age: 68
End: 2023-10-02
Payer: MEDICARE

## 2023-10-02 ENCOUNTER — OFFICE VISIT (OUTPATIENT)
Dept: ENDOCRINOLOGY | Facility: CLINIC | Age: 68
End: 2023-10-02
Payer: MEDICARE

## 2023-10-02 ENCOUNTER — PATIENT MESSAGE (OUTPATIENT)
Dept: ENDOCRINOLOGY | Facility: CLINIC | Age: 68
End: 2023-10-02

## 2023-10-02 DIAGNOSIS — Z79.4 TYPE 2 DIABETES MELLITUS WITH HYPERGLYCEMIA, WITH LONG-TERM CURRENT USE OF INSULIN: ICD-10-CM

## 2023-10-02 DIAGNOSIS — E11.65 TYPE 2 DIABETES MELLITUS WITH HYPERGLYCEMIA, WITH LONG-TERM CURRENT USE OF INSULIN: ICD-10-CM

## 2023-10-02 DIAGNOSIS — Z79.52 LONG TERM (CURRENT) USE OF SYSTEMIC STEROIDS: ICD-10-CM

## 2023-10-02 DIAGNOSIS — E78.5 HYPERLIPIDEMIA, UNSPECIFIED HYPERLIPIDEMIA TYPE: ICD-10-CM

## 2023-10-02 DIAGNOSIS — E55.9 VITAMIN D DEFICIENCY: ICD-10-CM

## 2023-10-02 DIAGNOSIS — E22.1 HYPERPROLACTINEMIA: Chronic | ICD-10-CM

## 2023-10-02 DIAGNOSIS — I10 ESSENTIAL HYPERTENSION: ICD-10-CM

## 2023-10-02 DIAGNOSIS — M81.0 OSTEOPOROSIS, UNSPECIFIED OSTEOPOROSIS TYPE, UNSPECIFIED PATHOLOGICAL FRACTURE PRESENCE: ICD-10-CM

## 2023-10-02 DIAGNOSIS — E66.09 CLASS 1 OBESITY DUE TO EXCESS CALORIES WITH SERIOUS COMORBIDITY AND BODY MASS INDEX (BMI) OF 34.0 TO 34.9 IN ADULT: ICD-10-CM

## 2023-10-02 PROCEDURE — 99214 OFFICE O/P EST MOD 30 MIN: CPT | Mod: 25,95,, | Performed by: NURSE PRACTITIONER

## 2023-10-02 PROCEDURE — 1159F MED LIST DOCD IN RCRD: CPT | Mod: CPTII,95,, | Performed by: NURSE PRACTITIONER

## 2023-10-02 PROCEDURE — 4010F ACE/ARB THERAPY RXD/TAKEN: CPT | Mod: CPTII,95,, | Performed by: NURSE PRACTITIONER

## 2023-10-02 PROCEDURE — 1159F PR MEDICATION LIST DOCUMENTED IN MEDICAL RECORD: ICD-10-PCS | Mod: CPTII,95,, | Performed by: NURSE PRACTITIONER

## 2023-10-02 PROCEDURE — 3051F HG A1C>EQUAL 7.0%<8.0%: CPT | Mod: CPTII,95,, | Performed by: NURSE PRACTITIONER

## 2023-10-02 PROCEDURE — 4010F PR ACE/ARB THEARPY RXD/TAKEN: ICD-10-PCS | Mod: CPTII,95,, | Performed by: NURSE PRACTITIONER

## 2023-10-02 PROCEDURE — 99214 PR OFFICE/OUTPT VISIT, EST, LEVL IV, 30-39 MIN: ICD-10-PCS | Mod: 25,95,, | Performed by: NURSE PRACTITIONER

## 2023-10-02 PROCEDURE — 3061F PR NEG MICROALBUMINURIA RESULT DOCUMENTED/REVIEW: ICD-10-PCS | Mod: CPTII,95,, | Performed by: NURSE PRACTITIONER

## 2023-10-02 PROCEDURE — 1160F PR REVIEW ALL MEDS BY PRESCRIBER/CLIN PHARMACIST DOCUMENTED: ICD-10-PCS | Mod: CPTII,95,, | Performed by: NURSE PRACTITIONER

## 2023-10-02 PROCEDURE — 1160F RVW MEDS BY RX/DR IN RCRD: CPT | Mod: CPTII,95,, | Performed by: NURSE PRACTITIONER

## 2023-10-02 PROCEDURE — 95251 PR GLUCOSE MONITOR, 72 HOUR, PHYS INTERP: ICD-10-PCS | Mod: NDTC,S$GLB,, | Performed by: NURSE PRACTITIONER

## 2023-10-02 PROCEDURE — 3066F PR DOCUMENTATION OF TREATMENT FOR NEPHROPATHY: ICD-10-PCS | Mod: CPTII,95,, | Performed by: NURSE PRACTITIONER

## 2023-10-02 PROCEDURE — 95251 CONT GLUC MNTR ANALYSIS I&R: CPT | Mod: NDTC,S$GLB,, | Performed by: NURSE PRACTITIONER

## 2023-10-02 PROCEDURE — 3061F NEG MICROALBUMINURIA REV: CPT | Mod: CPTII,95,, | Performed by: NURSE PRACTITIONER

## 2023-10-02 PROCEDURE — 3051F PR MOST RECENT HEMOGLOBIN A1C LEVEL 7.0 - < 8.0%: ICD-10-PCS | Mod: CPTII,95,, | Performed by: NURSE PRACTITIONER

## 2023-10-02 PROCEDURE — 3066F NEPHROPATHY DOC TX: CPT | Mod: CPTII,95,, | Performed by: NURSE PRACTITIONER

## 2023-10-02 RX ORDER — INSULIN DEGLUDEC 200 U/ML
56 INJECTION, SOLUTION SUBCUTANEOUS DAILY
Qty: 3 PEN | Refills: 11 | Status: SHIPPED | OUTPATIENT
Start: 2023-10-02 | End: 2023-10-25 | Stop reason: SDUPTHER

## 2023-10-02 NOTE — PATIENT INSTRUCTIONS
Steroid reduction    He is currently on   He is taking prednisone 3AM 15 mg; 9AM 2.5 mg; 3PM takes 5 mg.     Will try to wean to   He is taking prednisone 3AM 15 mg; 9AM 2.5 mg; 3PM take 2.5 mg. If you do well on this dose for a couple of days, decrease 3AM to 12.5 mg at 3AM.    [email or web address]    Osteoporosis                           Based on FRAX score               Does want to try osteoporosis medications              Avoid falls               Stop smoking               Continue exercise              Continue vitamin D and calcium in diet     Cholesterol               LDL goal less than 70              Consider Praulent in the future if needed   Check LP in future       Diabetes  Check labs in Oct  Log reviewed: some prolonged postprandial hyperglycemia. Blood sugars are variable.  He is insulin stacking likely due to high dose steroids. Encouraged to give fiasp 30 minutes prior to a meal.   Very mismatched with basal and bolus, due to steroids.  Medication Changes   INPEN with Fiasp cartridges   Average <100 units of novolog daily   I:C 1:8   Change ISF 15   Max    With Increase Tresiba U200 56 units daily

## 2023-10-02 NOTE — ASSESSMENT & PLAN NOTE
He is currently on   He is taking prednisone 3AM 15 mg; 9AM 2.5 mg; 3PM takes 5 mg.     Will try to wean to   He is taking prednisone 3AM 15 mg; 9AM 2.5 mg; 3PM take 2.5 mg. If you do well on this dose for a couple of days, decrease 3AM to 12.5 mg at 3AM.

## 2023-10-02 NOTE — ASSESSMENT & PLAN NOTE
BMI Readings from Last 1 Encounters:   09/21/23 31.57 kg/m²    complicated by HTN, HLD, diabetes   encourage pt to work on healthy diet, increase exercise as tolerated.  Unable to afford Mounjaro

## 2023-10-02 NOTE — ASSESSMENT & PLAN NOTE
Reviewed goals of therapy - to get the best control we can without hypoglycemia. Goal <7.5   - last a1c at goal    Check labs in Oct  Log reviewed: some prolonged postprandial hyperglycemia. Blood sugars are variable.  He is insulin stacking likely due to high dose steroids. Encouraged to give fiasp 30 minutes prior to a meal.     Medication Changes   INPEN with Fiasp cartridges   Average <100 units of novolog daily   I:C 1:8   Change ISF 15   Max    With Increase Tresiba U200 56 units daily    Reviewed patient's current insulin regimen. Clarified proper insulin dose and timing in relation to meals, etc. Insulin injection sites and proper rotation instructed.   - reviewed dose adjustments as well.   -Advised frequent self blood glucose monitoring. Patient encouraged to document glucose results and bring them to every clinic visit    -Hypoglycemia precautions discussed. Instructed on precautions before driving.    -Close adherence to lifestyle changes recommended.    -Periodic follow ups for eye evaluations, foot care suggested.  Not on statin due to allergy - consider PCSK9 in future

## 2023-10-03 ENCOUNTER — OFFICE VISIT (OUTPATIENT)
Dept: DERMATOLOGY | Facility: CLINIC | Age: 68
End: 2023-10-03
Payer: MEDICARE

## 2023-10-03 DIAGNOSIS — D49.9 NEOPLASM: ICD-10-CM

## 2023-10-03 DIAGNOSIS — L57.0 ACTINIC KERATOSIS: Primary | ICD-10-CM

## 2023-10-03 PROCEDURE — 88305 TISSUE EXAM BY PATHOLOGIST: ICD-10-PCS | Mod: 26,,, | Performed by: STUDENT IN AN ORGANIZED HEALTH CARE EDUCATION/TRAINING PROGRAM

## 2023-10-03 PROCEDURE — 17003 DESTRUCT PREMALG LES 2-14: CPT | Mod: S$GLB,,, | Performed by: DERMATOLOGY

## 2023-10-03 PROCEDURE — 88305 TISSUE EXAM BY PATHOLOGIST: CPT | Performed by: STUDENT IN AN ORGANIZED HEALTH CARE EDUCATION/TRAINING PROGRAM

## 2023-10-03 PROCEDURE — 17000 PR DESTRUCTION(LASER SURGERY,CRYOSURGERY,CHEMOSURGERY),PREMALIGNANT LESIONS,FIRST LESION: ICD-10-PCS | Mod: XS,S$GLB,, | Performed by: DERMATOLOGY

## 2023-10-03 PROCEDURE — 11102 PR TANGENTIAL BIOPSY, SKIN, SINGLE LESION: ICD-10-PCS | Mod: S$GLB,,, | Performed by: DERMATOLOGY

## 2023-10-03 PROCEDURE — 99999 PR PBB SHADOW E&M-EST. PATIENT-LVL IV: CPT | Mod: PBBFAC,,, | Performed by: DERMATOLOGY

## 2023-10-03 PROCEDURE — 88305 TISSUE EXAM BY PATHOLOGIST: CPT | Mod: 26,,, | Performed by: STUDENT IN AN ORGANIZED HEALTH CARE EDUCATION/TRAINING PROGRAM

## 2023-10-03 PROCEDURE — 99499 UNLISTED E&M SERVICE: CPT | Mod: S$GLB,,, | Performed by: DERMATOLOGY

## 2023-10-03 PROCEDURE — 99999 PR PBB SHADOW E&M-EST. PATIENT-LVL IV: ICD-10-PCS | Mod: PBBFAC,,, | Performed by: DERMATOLOGY

## 2023-10-03 PROCEDURE — 17000 DESTRUCT PREMALG LESION: CPT | Mod: XS,S$GLB,, | Performed by: DERMATOLOGY

## 2023-10-03 PROCEDURE — 11102 TANGNTL BX SKIN SINGLE LES: CPT | Mod: S$GLB,,, | Performed by: DERMATOLOGY

## 2023-10-03 PROCEDURE — 99499 NO LOS: ICD-10-PCS | Mod: S$GLB,,, | Performed by: DERMATOLOGY

## 2023-10-03 PROCEDURE — 17003 DESTRUCTION, PREMALIGNANT LESIONS; SECOND THROUGH 14 LESIONS: ICD-10-PCS | Mod: S$GLB,,, | Performed by: DERMATOLOGY

## 2023-10-03 NOTE — PATIENT INSTRUCTIONS
Shave Biopsy Wound Care    Your doctor has performed a shave biopsy today.  A band aid and vaseline ointment has been placed over the site.  This should remain in place for NO LONGER THAN 48 hours.  It is fine to remove the bandaid after 24 hours, if the area is no longer bleeding. It is recommended that you keep the area dry (do not wet)) for the first 24 hours.  After 24 hours, wash the area with warm soap and water and apply Vaseline jelly.  Many patients prefer to use Neosporin or Bacitracin ointment.  This is acceptable; however, know that you can develop an allergy to this medication even if you have used it safely for years.  It is important to keep the area moist.  Letting it dry out and get air slows healing time, and will worsen the scar.        If you notice increasing redness, tenderness, pain, or yellow drainage at the biopsy site, please notify your doctor.  These are signs of an infection.    If your biopsy site is bleeding, apply firm pressure for 15 minutes straight.  Repeat for another 15 minutes, if it is still bleeding.   If the surgical site continues to bleed, then please contact your doctor.      For [facility] users:   You will receive your biopsy results in [facility] as soon as they are available. Please be assured that your physician/provider will review your results and will then determine what further treatment, evaluation, or planning is required. You should be contacted by your physician's/provider's office within 5 business days of receiving your results; If not, please reach out to directly. This is one more way [facility] is putting you first.     [facility] [place] [address]/ [phone] FAX/ [email or web address]     CRYOSURGERY      Your doctor has used a method called cryosurgery to treat your skin condition. Cryosurgery refers to the use of very cold substances to treat a variety of skin conditions such as warts, pre-skin cancers, molluscum contagiosum,  sun spots, and several benign growths. The substance we use in cryosurgery is liquid nitrogen and is so cold (-195 degrees Celsius) that is burns when administered.     Following treatment in the office, the skin may immediately burn and become red. You may find the area around the lesion is affected as well. It is sometimes necessary to treat not only the lesion, but a small area of the surrounding normal skin to achieve a good response.     A blister, and even a blood filled blister, may form after treatment.   This is a normal response. If the blister is painful, it is acceptable to sterilize a needle and with rubbing alcohol and gently pop the blister. It is important that you gently wash the area with soap and warm water as the blister fluid may contain wart virus if a wart was treated. Do no remove the roof of the blister.     The area treated can take anywhere from 1-3 weeks to heal. Healing time depends on the kind skin lesion treated, the location, and how aggressively the lesion was treated. It is recommended that the areas treated are covered with Vaseline or bacitracin ointment and a band-aid. If a band-aid is not practical, just ointment applied several times per day will do. Keeping these areas moist will speed the healing time.    Treatment with liquid nitrogen can leave a scar. In dark skin, it may be a light or dark scar, in light skin it may be a white or pink scar. These will generally fade with time, but may never go away completely.     If you have any concerns after your treatment, please feel free to call the office.       [facility] [place] [address]/ [phone] FAX/ [email or web address]

## 2023-10-03 NOTE — PROGRESS NOTES
Subjective:      Patient ID:  Nithin Wagner is a 68 y.o. male who presents for   Chief Complaint   Patient presents with    Lesion     Forehead     Lesion - Initial  Affected locations: scalp  Duration: 5 weeks  Signs / symptoms: tender, oozing, irritated, pain, growing and inflamed  Severity: moderate  Timing: constant  Aggravated by: pressure  Relieving factors/Treatments tried: antibiotics        Review of Systems   Constitutional:  Positive for night sweats. Negative for fever and chills.   HENT:  Negative for sore throat.    Respiratory:  Negative for cough.        Objective:   Physical Exam   Constitutional: He appears well-developed and well-nourished.   Neurological: He is alert and oriented to person, place, and time.   Psychiatric: He has a normal mood and affect.   Skin:               Diagram Legend     Erythematous scaling macule/papule c/w actinic keratosis       Vascular papule c/w angioma      Pigmented verrucoid papule/plaque c/w seborrheic keratosis      Yellow umbilicated papule c/w sebaceous hyperplasia      Irregularly shaped tan macule c/w lentigo     1-2 mm smooth white papules consistent with Milia      Movable subcutaneous cyst with punctum c/w epidermal inclusion cyst      Subcutaneous movable cyst c/w pilar cyst      Firm pink to brown papule c/w dermatofibroma      Pedunculated fleshy papule(s) c/w skin tag(s)      Evenly pigmented macule c/w junctional nevus     Mildly variegated pigmented, slightly irregular-bordered macule c/w mildly atypical nevus      Flesh colored to evenly pigmented papule c/w intradermal nevus       Pink pearly papule/plaque c/w basal cell carcinoma      Erythematous hyperkeratotic cursted plaque c/w SCC      Surgical scar with no sign of skin cancer recurrence      Open and closed comedones      Inflammatory papules and pustules      Verrucoid papule consistent consistent with wart     Erythematous eczematous patches and plaques     Dystrophic onycholytic nail  with subungual debris c/w onychomycosis     Umbilicated papule    Erythematous-base heme-crusted tan verrucoid plaque consistent with inflamed seborrheic keratosis     Erythematous Silvery Scaling Plaque c/w Psoriasis     See annotation                    Assessment / Plan:      Pathology Orders:       Normal Orders This Visit    Specimen to Pathology, Dermatology     Questions:    Procedure Type: Dermatology and skin neoplasms    Number of Specimens: 1    ------------------------: -------------------------    Spec 1 Procedure: Biopsy    Spec 1 Clinical Impression: ro scc    Spec 1 Source: forehead    Release to patient:           Actinic keratosis  Discussed all of the following:  Actinic keratosis is a skin growth caused by sun damage. These growths are not cancer, but they may develop into skin cancer (precancerous). Many people get these growths, especially as they age. This is because of cumulative sun exposure over years. Multiple growths are called actinic keratoses.  Reviewed with patient different treatment options and associated risks.  Cryosurgery Procedure Note    Verbal consent from the patient is obtained including, but not limited to, risk of hypopigmentation/hyperpigmentation, scar, recurrence of lesion. The patient is aware of the precancerous quality and need for treatment of these lesions. Liquid nitrogen cryosurgery is applied to the 4 actinic keratoses, as detailed in the physical exam, to produce a freeze injury. The patient is aware that blisters may form and is instructed on wound care with gentle cleansing and use of vaseline ointment to keep moist until healed. The patient is supplied a handout on cryosurgery and is instructed to call if lesions do not completely resolve.    Neoplasm  -     Specimen to Pathology, Dermatology  Shave biopsy procedure note:  ro scc    Shave biopsy performed after verbal consent including risk of infection, scar, recurrence, need for additional treatment of  site. Area prepped with alcohol, anesthetized with approximately 1.0cc of 1% lidocaine with epinephrine. Lesional tissue shaved with razor blade. Hemostasis achieved with application of aluminum chloride followed by hyfrecation. No complications. Dressing applied. Wound care explained.  Discussed Mohs surgery with patient if biopsy is positive for skin cancer.  Reviewed options of doing Mohs with Dr. Lyman in [place] versus regular excision with me if unable to do mohs for whatever reason.  Brochure given for patient education.               Follow up if symptoms worsen or fail to improve, for Post biopsy results.

## 2023-10-04 ENCOUNTER — OUTPATIENT CASE MANAGEMENT (OUTPATIENT)
Dept: ADMINISTRATIVE | Facility: OTHER | Age: 68
End: 2023-10-04
Payer: MEDICARE

## 2023-10-04 NOTE — PROGRESS NOTES
Outpatient Care Management  Patient Does Not Consent    Patient: Nithin Wagner  MRN:  [id number]  Date of Service:  10/4/2023  Completed by:  Samantha Baker RN    Chief Complaint   Patient presents with    Case Closure       Patient Summary           Consent Received:  Decline  Decline Reason:  Needs met

## 2023-10-04 NOTE — LETTER
Nithin Wagner  [address] [facility] [address]      Dear Nithin Wagner,    This is a failed to reach you letter that tells about what we do.  If you change your mind, please call me at the phone number listed at the bottom of the page.  Ignore the attempted to reach you part as we already talked on the phone.     I work with [facility] Outpatient Care Management Department. We received a referral to call you to discuss your medical history. These services are free of charge and are offered to [facility] patients who have recently been discharged from any of our facilities or who have medical conditions that may require the skill of a nurse to assist with management.     I am a Registered Nurse who specializes in connecting patients with available medical and financial resources as well as addressing any educational needs that may be indicated.    I attempted to reach you by telephone, but I was unsuccessful. Please call our department so that we can go over some questions with you, regarding your health.    The Outpatient Care Management Department can be reached at [phone], from 8:00AM to 4:30 PM, on Monday thru Friday.     Additionally, [facility] also has a program where a nurse is available 24/7 to answer questions or provide medical advice, their number is [phone].      Thanks,    Moni Baker RN  Outpatient Care Management  Phone #: [phone]

## 2023-10-09 ENCOUNTER — OFFICE VISIT (OUTPATIENT)
Dept: OPTOMETRY | Facility: CLINIC | Age: 68
End: 2023-10-09
Payer: MEDICARE

## 2023-10-09 DIAGNOSIS — H25.13 NUCLEAR SCLEROSIS, BILATERAL: ICD-10-CM

## 2023-10-09 DIAGNOSIS — E11.9 TYPE 2 DIABETES MELLITUS WITHOUT RETINOPATHY: Primary | ICD-10-CM

## 2023-10-09 DIAGNOSIS — Z13.5 GLAUCOMA SCREENING: ICD-10-CM

## 2023-10-09 DIAGNOSIS — H52.4 PRESBYOPIA: ICD-10-CM

## 2023-10-09 PROCEDURE — 3061F PR NEG MICROALBUMINURIA RESULT DOCUMENTED/REVIEW: ICD-10-PCS | Mod: CPTII,S$GLB,, | Performed by: OPTOMETRIST

## 2023-10-09 PROCEDURE — 3051F PR MOST RECENT HEMOGLOBIN A1C LEVEL 7.0 - < 8.0%: ICD-10-PCS | Mod: CPTII,S$GLB,, | Performed by: OPTOMETRIST

## 2023-10-09 PROCEDURE — 1160F PR REVIEW ALL MEDS BY PRESCRIBER/CLIN PHARMACIST DOCUMENTED: ICD-10-PCS | Mod: CPTII,S$GLB,, | Performed by: OPTOMETRIST

## 2023-10-09 PROCEDURE — 92015 PR REFRACTION: ICD-10-PCS | Mod: S$GLB,,, | Performed by: OPTOMETRIST

## 2023-10-09 PROCEDURE — 4010F PR ACE/ARB THEARPY RXD/TAKEN: ICD-10-PCS | Mod: CPTII,S$GLB,, | Performed by: OPTOMETRIST

## 2023-10-09 PROCEDURE — 99999 PR PBB SHADOW E&M-EST. PATIENT-LVL III: CPT | Mod: PBBFAC,,, | Performed by: OPTOMETRIST

## 2023-10-09 PROCEDURE — 3288F PR FALLS RISK ASSESSMENT DOCUMENTED: ICD-10-PCS | Mod: CPTII,S$GLB,, | Performed by: OPTOMETRIST

## 2023-10-09 PROCEDURE — 2023F DILAT RTA XM W/O RTNOPTHY: CPT | Mod: CPTII,S$GLB,, | Performed by: OPTOMETRIST

## 2023-10-09 PROCEDURE — 99999 PR PBB SHADOW E&M-EST. PATIENT-LVL III: ICD-10-PCS | Mod: PBBFAC,,, | Performed by: OPTOMETRIST

## 2023-10-09 PROCEDURE — 4010F ACE/ARB THERAPY RXD/TAKEN: CPT | Mod: CPTII,S$GLB,, | Performed by: OPTOMETRIST

## 2023-10-09 PROCEDURE — 3061F NEG MICROALBUMINURIA REV: CPT | Mod: CPTII,S$GLB,, | Performed by: OPTOMETRIST

## 2023-10-09 PROCEDURE — 92004 COMPRE OPH EXAM NEW PT 1/>: CPT | Mod: S$GLB,,, | Performed by: OPTOMETRIST

## 2023-10-09 PROCEDURE — 1101F PR PT FALLS ASSESS DOC 0-1 FALLS W/OUT INJ PAST YR: ICD-10-PCS | Mod: CPTII,S$GLB,, | Performed by: OPTOMETRIST

## 2023-10-09 PROCEDURE — 92015 DETERMINE REFRACTIVE STATE: CPT | Mod: S$GLB,,, | Performed by: OPTOMETRIST

## 2023-10-09 PROCEDURE — 2023F PR DILATED RETINAL EXAM W/O EVID OF RETINOPATHY: ICD-10-PCS | Mod: CPTII,S$GLB,, | Performed by: OPTOMETRIST

## 2023-10-09 PROCEDURE — 92004 PR EYE EXAM, NEW PATIENT,COMPREHESV: ICD-10-PCS | Mod: S$GLB,,, | Performed by: OPTOMETRIST

## 2023-10-09 PROCEDURE — 1159F MED LIST DOCD IN RCRD: CPT | Mod: CPTII,S$GLB,, | Performed by: OPTOMETRIST

## 2023-10-09 PROCEDURE — 3066F PR DOCUMENTATION OF TREATMENT FOR NEPHROPATHY: ICD-10-PCS | Mod: CPTII,S$GLB,, | Performed by: OPTOMETRIST

## 2023-10-09 PROCEDURE — 1126F PR PAIN SEVERITY QUANTIFIED, NO PAIN PRESENT: ICD-10-PCS | Mod: CPTII,S$GLB,, | Performed by: OPTOMETRIST

## 2023-10-09 PROCEDURE — 3051F HG A1C>EQUAL 7.0%<8.0%: CPT | Mod: CPTII,S$GLB,, | Performed by: OPTOMETRIST

## 2023-10-09 PROCEDURE — 1159F PR MEDICATION LIST DOCUMENTED IN MEDICAL RECORD: ICD-10-PCS | Mod: CPTII,S$GLB,, | Performed by: OPTOMETRIST

## 2023-10-09 PROCEDURE — 1101F PT FALLS ASSESS-DOCD LE1/YR: CPT | Mod: CPTII,S$GLB,, | Performed by: OPTOMETRIST

## 2023-10-09 PROCEDURE — 1160F RVW MEDS BY RX/DR IN RCRD: CPT | Mod: CPTII,S$GLB,, | Performed by: OPTOMETRIST

## 2023-10-09 PROCEDURE — 3288F FALL RISK ASSESSMENT DOCD: CPT | Mod: CPTII,S$GLB,, | Performed by: OPTOMETRIST

## 2023-10-09 PROCEDURE — 3066F NEPHROPATHY DOC TX: CPT | Mod: CPTII,S$GLB,, | Performed by: OPTOMETRIST

## 2023-10-09 PROCEDURE — 1126F AMNT PAIN NOTED NONE PRSNT: CPT | Mod: CPTII,S$GLB,, | Performed by: OPTOMETRIST

## 2023-10-09 NOTE — PROGRESS NOTES
HPI    69 Y/o male is here for routine eye exam with C/o pt states he is needing   more light see clearly to when reading, pt states he also notices his   vision is sometimes double vision when looking mid distance, pt states he   sugar levels are elevated and his vision has been blurry.  Pt denies pain and discomfort   No f/f    Eye med: no gtt   Last edited by Karla Perez MA on 10/9/2023  8:24 AM.            Assessment /Plan     For exam results, see Encounter Report.    Type 2 diabetes mellitus without retinopathy    Nuclear sclerosis, bilateral    Glaucoma screening    Presbyopia      Cat OS>OD--pt wishes surgery  DM- WITHOUT RETINOPATHY.  Advised yearly DFE   3. OC annabel hx (once or twice a year)    PLAN:    Surgical consult--Dr Woodawrd

## 2023-10-10 ENCOUNTER — PATIENT MESSAGE (OUTPATIENT)
Dept: DERMATOLOGY | Facility: CLINIC | Age: 68
End: 2023-10-10
Payer: MEDICARE

## 2023-10-10 LAB
FINAL PATHOLOGIC DIAGNOSIS: NORMAL
Lab: NORMAL

## 2023-10-11 ENCOUNTER — CLINICAL SUPPORT (OUTPATIENT)
Dept: DIABETES | Facility: CLINIC | Age: 68
End: 2023-10-11
Payer: MEDICARE

## 2023-10-11 DIAGNOSIS — Z79.4 TYPE 2 DIABETES MELLITUS WITH HYPERGLYCEMIA, WITH LONG-TERM CURRENT USE OF INSULIN: Primary | ICD-10-CM

## 2023-10-11 DIAGNOSIS — E11.65 TYPE 2 DIABETES MELLITUS WITH HYPERGLYCEMIA, WITH LONG-TERM CURRENT USE OF INSULIN: Primary | ICD-10-CM

## 2023-10-11 PROCEDURE — G0108 PR DIAB MANAGE TRN  PER INDIV: ICD-10-PCS | Mod: 95,,,

## 2023-10-11 PROCEDURE — G0108 DIAB MANAGE TRN  PER INDIV: HCPCS | Mod: 95,,,

## 2023-10-12 DIAGNOSIS — E11.65 TYPE 2 DIABETES MELLITUS WITH HYPERGLYCEMIA, WITH LONG-TERM CURRENT USE OF INSULIN: Primary | ICD-10-CM

## 2023-10-12 DIAGNOSIS — Z79.4 TYPE 2 DIABETES MELLITUS WITH HYPERGLYCEMIA, WITH LONG-TERM CURRENT USE OF INSULIN: Primary | ICD-10-CM

## 2023-10-12 RX ORDER — INSULIN ASPART INJECTION 100 [IU]/ML
INJECTION, SOLUTION SUBCUTANEOUS
Qty: 14 PEN | Refills: 11 | Status: SHIPPED | OUTPATIENT
Start: 2023-10-12

## 2023-10-18 ENCOUNTER — PATIENT MESSAGE (OUTPATIENT)
Dept: ENDOCRINOLOGY | Facility: CLINIC | Age: 68
End: 2023-10-18
Payer: MEDICARE

## 2023-10-24 ENCOUNTER — PATIENT MESSAGE (OUTPATIENT)
Dept: CARDIOLOGY | Facility: CLINIC | Age: 68
End: 2023-10-24

## 2023-10-24 ENCOUNTER — OFFICE VISIT (OUTPATIENT)
Dept: CARDIOLOGY | Facility: CLINIC | Age: 68
End: 2023-10-24
Payer: MEDICARE

## 2023-10-24 VITALS
SYSTOLIC BLOOD PRESSURE: 132 MMHG | OXYGEN SATURATION: 96 % | BODY MASS INDEX: 34.38 KG/M2 | DIASTOLIC BLOOD PRESSURE: 74 MMHG | HEART RATE: 96 BPM | WEIGHT: 239.63 LBS

## 2023-10-24 DIAGNOSIS — I10 PRIMARY HYPERTENSION: ICD-10-CM

## 2023-10-24 DIAGNOSIS — E78.00 HYPERCHOLESTEROLEMIA: ICD-10-CM

## 2023-10-24 DIAGNOSIS — I25.10 ATHEROSCLEROSIS OF NATIVE CORONARY ARTERY OF NATIVE HEART WITHOUT ANGINA PECTORIS: ICD-10-CM

## 2023-10-24 DIAGNOSIS — I47.10 SUPRAVENTRICULAR TACHYCARDIA: ICD-10-CM

## 2023-10-24 DIAGNOSIS — I50.33 ACUTE ON CHRONIC DIASTOLIC HEART FAILURE: Primary | ICD-10-CM

## 2023-10-24 PROCEDURE — 3008F BODY MASS INDEX DOCD: CPT | Mod: CPTII,S$GLB,, | Performed by: INTERNAL MEDICINE

## 2023-10-24 PROCEDURE — 3061F PR NEG MICROALBUMINURIA RESULT DOCUMENTED/REVIEW: ICD-10-PCS | Mod: CPTII,S$GLB,, | Performed by: INTERNAL MEDICINE

## 2023-10-24 PROCEDURE — 99999 PR PBB SHADOW E&M-EST. PATIENT-LVL IV: CPT | Mod: PBBFAC,,, | Performed by: INTERNAL MEDICINE

## 2023-10-24 PROCEDURE — 3075F PR MOST RECENT SYSTOLIC BLOOD PRESS GE 130-139MM HG: ICD-10-PCS | Mod: CPTII,S$GLB,, | Performed by: INTERNAL MEDICINE

## 2023-10-24 PROCEDURE — 99215 OFFICE O/P EST HI 40 MIN: CPT | Mod: S$GLB,,, | Performed by: INTERNAL MEDICINE

## 2023-10-24 PROCEDURE — 3051F HG A1C>EQUAL 7.0%<8.0%: CPT | Mod: CPTII,S$GLB,, | Performed by: INTERNAL MEDICINE

## 2023-10-24 PROCEDURE — 1126F PR PAIN SEVERITY QUANTIFIED, NO PAIN PRESENT: ICD-10-PCS | Mod: CPTII,S$GLB,, | Performed by: INTERNAL MEDICINE

## 2023-10-24 PROCEDURE — 3078F PR MOST RECENT DIASTOLIC BLOOD PRESSURE < 80 MM HG: ICD-10-PCS | Mod: CPTII,S$GLB,, | Performed by: INTERNAL MEDICINE

## 2023-10-24 PROCEDURE — 3288F FALL RISK ASSESSMENT DOCD: CPT | Mod: CPTII,S$GLB,, | Performed by: INTERNAL MEDICINE

## 2023-10-24 PROCEDURE — 1101F PR PT FALLS ASSESS DOC 0-1 FALLS W/OUT INJ PAST YR: ICD-10-PCS | Mod: CPTII,S$GLB,, | Performed by: INTERNAL MEDICINE

## 2023-10-24 PROCEDURE — 4010F PR ACE/ARB THEARPY RXD/TAKEN: ICD-10-PCS | Mod: CPTII,S$GLB,, | Performed by: INTERNAL MEDICINE

## 2023-10-24 PROCEDURE — 3066F NEPHROPATHY DOC TX: CPT | Mod: CPTII,S$GLB,, | Performed by: INTERNAL MEDICINE

## 2023-10-24 PROCEDURE — 3078F DIAST BP <80 MM HG: CPT | Mod: CPTII,S$GLB,, | Performed by: INTERNAL MEDICINE

## 2023-10-24 PROCEDURE — 3008F PR BODY MASS INDEX (BMI) DOCUMENTED: ICD-10-PCS | Mod: CPTII,S$GLB,, | Performed by: INTERNAL MEDICINE

## 2023-10-24 PROCEDURE — 3075F SYST BP GE 130 - 139MM HG: CPT | Mod: CPTII,S$GLB,, | Performed by: INTERNAL MEDICINE

## 2023-10-24 PROCEDURE — 1159F PR MEDICATION LIST DOCUMENTED IN MEDICAL RECORD: ICD-10-PCS | Mod: CPTII,S$GLB,, | Performed by: INTERNAL MEDICINE

## 2023-10-24 PROCEDURE — 1101F PT FALLS ASSESS-DOCD LE1/YR: CPT | Mod: CPTII,S$GLB,, | Performed by: INTERNAL MEDICINE

## 2023-10-24 PROCEDURE — 3051F PR MOST RECENT HEMOGLOBIN A1C LEVEL 7.0 - < 8.0%: ICD-10-PCS | Mod: CPTII,S$GLB,, | Performed by: INTERNAL MEDICINE

## 2023-10-24 PROCEDURE — 3061F NEG MICROALBUMINURIA REV: CPT | Mod: CPTII,S$GLB,, | Performed by: INTERNAL MEDICINE

## 2023-10-24 PROCEDURE — 1159F MED LIST DOCD IN RCRD: CPT | Mod: CPTII,S$GLB,, | Performed by: INTERNAL MEDICINE

## 2023-10-24 PROCEDURE — 99215 PR OFFICE/OUTPT VISIT, EST, LEVL V, 40-54 MIN: ICD-10-PCS | Mod: S$GLB,,, | Performed by: INTERNAL MEDICINE

## 2023-10-24 PROCEDURE — 4010F ACE/ARB THERAPY RXD/TAKEN: CPT | Mod: CPTII,S$GLB,, | Performed by: INTERNAL MEDICINE

## 2023-10-24 PROCEDURE — 99999 PR PBB SHADOW E&M-EST. PATIENT-LVL IV: ICD-10-PCS | Mod: PBBFAC,,, | Performed by: INTERNAL MEDICINE

## 2023-10-24 PROCEDURE — 1126F AMNT PAIN NOTED NONE PRSNT: CPT | Mod: CPTII,S$GLB,, | Performed by: INTERNAL MEDICINE

## 2023-10-24 PROCEDURE — 3066F PR DOCUMENTATION OF TREATMENT FOR NEPHROPATHY: ICD-10-PCS | Mod: CPTII,S$GLB,, | Performed by: INTERNAL MEDICINE

## 2023-10-24 PROCEDURE — 3288F PR FALLS RISK ASSESSMENT DOCUMENTED: ICD-10-PCS | Mod: CPTII,S$GLB,, | Performed by: INTERNAL MEDICINE

## 2023-10-24 RX ORDER — CARVEDILOL 6.25 MG/1
6.25 TABLET ORAL 2 TIMES DAILY WITH MEALS
Qty: 180 TABLET | Refills: 3 | Status: SHIPPED | OUTPATIENT
Start: 2023-10-24 | End: 2024-10-23

## 2023-10-24 RX ORDER — DILTIAZEM HYDROCHLORIDE 120 MG/1
120 CAPSULE, COATED, EXTENDED RELEASE ORAL DAILY
COMMUNITY
Start: 2023-08-15

## 2023-10-24 NOTE — PROGRESS NOTES
[facility]    Chief Complaint  Chief Complaint   Patient presents with    Coronary Artery Disease       HPI:    Did not stop digoxin after last visit.  SVT remains controlled.  Has been on higher doses of prednisone.  Gaining weight.  Started taking Lasix just over a week ago.  Three days ago increased to 80 mg daily.  Better urine output and edema is improving.  On Thursday is going to have removal of a squamous cell cancer from his forehead.    Medications  Current Outpatient Medications   Medication Sig Dispense Refill    aspirin 81 MG Chew Take 81 mg by mouth.      blood-glucose meter,continuous (DEXCOM G7 ) Misc To use with sensor 1 each 0    blood-glucose sensor (DEXCOM G7 SENSOR) [place] To change every 10 days 3 each 3    carvediloL (COREG) 6.25 MG tablet Take 1 tablet (6.25 mg total) by mouth 2 (two) times daily with meals. 180 tablet 3    digoxin (LANOXIN) 125 mcg tablet Take 1 tablet (125 mcg total) by mouth once daily. 90 tablet 3    diltiaZEM (CARDIZEM CD) 120 MG Cp24 Take 120 mg by mouth Daily.      ergocalciferol (ERGOCALCIFEROL) 50,000 unit Cap Take 1 capsule (50,000 Units total) by mouth every 7 days. 12 capsule 3    folic acid (FOLVITE) 1 MG tablet Take 1 tablet (1 mg total) by mouth once daily. 30 tablet 5    furosemide (LASIX) 40 MG tablet Take 1 tablet (40 mg total) by mouth daily as needed (fluid). (Patient taking differently: Take 40 mg by mouth daily as needed (fluid). 80mg daily) 90 tablet 3    glucagon (BAQSIMI) 3 mg/actuation [place] Baqsimi is an intranasal -in your nose-spray that is used to bring up your blood sugar in an emergency situation where you are unable to eat or drink anything. 1 each 1    insulin aspart, niacinamide, (FIASP PENFILL U-100 INSULIN) 100 unit/mL (3 mL) Crtg pen To use with inPen; 140 max daily dose. 14 pen 11    insulin degludec (TRESIBA FLEXTOUCH U-200) 200 unit/mL (3 mL) insulin pen Inject 56 Units into the skin once daily. 3 pen 11    insulin  pump cart,automated,BT (OMNIPOD 5 G6 PODS, GEN 5,) Crtg Inject 1 patch into the skin every 48 hours. 15 each 11    lamiVUDine (EPIVIR) 150 MG Tab TAKE 1 TABLET BY MOUTH EVERY DAY 30 tablet 23    LIDOcaine (LIDODERM) 5 % Apply to affected area as needed for pain for 12 hours, then off for 12 hours. Discard after each use 30 patch 0    multivitamin (THERAGRAN) per tablet Take 1 tablet by mouth once daily.      mupirocin (BACTROBAN) 2 % ointment Apply to affected area 3 times daily 22 g 1    nitroGLYCERIN (NITROSTAT) 0.4 MG SL tablet Place 1 tablet (0.4 mg total) under the tongue every 5 (five) minutes as needed for Chest pain. 25 tablet 11    NOVOLOG PENFILL U-100 INSULIN 100 unit/mL Crtg 100 unit/mL cartridge To use in INPEN cartridges. Up to 140 units per day 45 mL 3    nystatin (MYCOSTATIN) powder Apply topically 4 (four) times daily. Apply to intertriginous areas as directed up to four times daily to reduce moisture. for 14 days 30 g 0    olmesartan (BENICAR) 40 MG tablet TAKE 1 TABLET BY MOUTH EVERY DAY AT NIGHT 90 tablet 3    ondansetron (ZOFRAN) 4 MG tablet Take 1 tablet (4 mg total) by mouth every 8 (eight) hours as needed for Nausea. 60 tablet 6    OPW TEST CLAIM - DO NOT FILL Inject into the skin. OPW test claim. Do not fill. 30 mL 0    predniSONE (DELTASONE) 10 MG tablet Take 1 tablet (10 mg total) by mouth once daily. 90 tablet 4    predniSONE (DELTASONE) 2.5 MG tablet TAKE 5 TABLETS (12.5 MG TOTAL) BY MOUTH ONCE DAILY. 150 tablet 2    predniSONE (DELTASONE) 5 MG tablet Take 1 tablet (5 mg total) by mouth once daily. 90 tablet 3    PROAIR HFA 90 mcg/actuation inhaler        No current facility-administered medications for this visit.        History  Past Medical History:   Diagnosis Date    Arthritis     Atrial myxoma     Coronary atherosclerosis     Diabetes mellitus, type 2     Difficult intubation     Heart failure     Hepatitis B     Hyperlipidemia     Hypertension     Non-alcoholic fatty liver disease      Rheumatoid arthritis     Rheumatoid arthritis flare 07/12/2021    Stroke     TIA     Past Surgical History:   Procedure Laterality Date    CORONARY ANGIOGRAPHY N/A 3/10/2021    Procedure: ANGIOGRAM, CORONARY ARTERY - right radial;  Surgeon: Shemar Dempsey MD;  Location: [facility] CATH LAB;  Service: Cardiology;  Laterality: N/A;    CORONARY STENT PLACEMENT  03/10/2021    prox-mid RCA [place] 4.5 x 26 mm, 4.5 x 12 mm    INCISION AND DRAINAGE OF SCROTUM N/A 11/15/2022    Procedure: INCISION AND DRAINAGE, SCROTUM;  Surgeon: William Hatch MD;  Location: [facility] OR;  Service: Urology;  Laterality: N/A;    INCISION AND DRAINAGE OF SCROTUM N/A 11/17/2022    Procedure: INCISION AND DRAINAGE, SCROTUM;  Surgeon: William Hatch MD;  Location: [facility] OR;  Service: Urology;  Laterality: N/A;    LUNG LOBECTOMY Right 2008    RUL lobectomy after removal of atrial myxoma    PLEURA BIOPSY      RESECTION OF ATRIAL MYXOMA  2007     Social History     Socioeconomic History    Marital status: Single   Occupational History    Occupation: , respiratory therapist, founded [employer]     Comment: Retired   Tobacco Use    Smoking status: Every Day     Current packs/day: 1.00     Average packs/day: 1 pack/day for 35.0 years (35.0 ttl pk-yrs)     Types: Cigarettes    Smokeless tobacco: Never   Substance and Sexual Activity    Alcohol use: Not Currently    Drug use: No    Sexual activity: Never     Social Determinants of Health     Financial Resource Strain: High Risk (11/22/2022)    Overall Financial Resource Strain (CARDIA)     Difficulty of Paying Living Expenses: Hard   Food Insecurity: No Food Insecurity (11/22/2022)    Hunger Vital Sign     Worried About Running Out of Food in the Last Year: Never true     Ran Out of Food in the Last Year: Never true   Transportation Needs: Unmet Transportation Needs (11/22/2022)    PRAPARE - Transportation     Lack of Transportation (Medical): Yes     Lack of Transportation  (Non-Medical): Yes   Physical Activity: Inactive (11/22/2022)    Exercise Vital Sign     Days of Exercise per Week: 0 days     Minutes of Exercise per Session: 0 min   Stress: Stress Concern Present (11/22/2022)    [place] [employer] - Occupational Stress Questionnaire     Feeling of Stress : To some extent   Social Connections: Socially Isolated (11/22/2022)    Social Connection and Isolation Panel [NHANES]     Frequency of Communication with Friends and Family: More than three times a week     Frequency of Social Gatherings with Friends and Family: More than three times a week     Attends Oriental orthodox Services: Never     Active Member of Clubs or Organizations: No     Attends Club or Organization Meetings: Never     Marital Status: Never    Housing Stability: High Risk (11/22/2022)    Housing Stability Vital Sign     Unable to Pay for Housing in the Last Year: Yes     Number of Places Lived in the Last Year: 1     Unstable Housing in the Last Year: No     Family History   Problem Relation Age of Onset    Hodgkin's lymphoma Mother     Diabetes type II Mother     Kidney failure Father     Diabetes type I Father     Cancer Sister         Allergies  Review of patient's allergies indicates:   Allergen Reactions    Enbrel [etanercept] Shortness Of Breath     CHF    Nsaids (non-steroidal anti-inflammatory drug) Other (See Comments)     hypertention  Other reaction(s): Other (See Comments)  hypertention  HTN    Statins-hmg-coa reductase inhibitors Other (See Comments)     Heart arrythemias  Other reaction(s): Other (See Comments)  Heart arrythemias  Joint pain and cardiac arrythmias    Pcn [penicillins] Rash       Review of Systems   Review of Systems   Constitutional: Positive for diaphoresis. Negative for malaise/fatigue, weight gain and weight loss.   Eyes:  Negative for visual disturbance.   Cardiovascular:  Positive for dyspnea on exertion and leg swelling. Negative for chest pain,  claudication, cyanosis, irregular heartbeat, near-syncope, orthopnea, palpitations, paroxysmal nocturnal dyspnea and syncope.   Respiratory:  Positive for shortness of breath. Negative for hemoptysis, sleep disturbances due to breathing and wheezing.    Hematologic/Lymphatic: Negative for bleeding problem. Does not bruise/bleed easily.   Skin:  Negative for poor wound healing.   Musculoskeletal:  Negative for muscle cramps and myalgias.   Gastrointestinal:  Negative for abdominal pain, anorexia, diarrhea, heartburn, hematemesis, hematochezia, melena, nausea and vomiting.   Genitourinary:  Negative for hematuria and nocturia.   Neurological:  Negative for excessive daytime sleepiness, dizziness, focal weakness, light-headedness and weakness.       Physical Exam  Vitals:    10/24/23 1411   BP: 132/74   Pulse: 96     Wt Readings from Last 1 Encounters:   10/24/23 108.7 kg (239 lb 9.6 oz)     Physical Exam  Constitutional:       General: He is not in acute distress.     Appearance: He is obese. He is diaphoretic. He is not toxic-appearing.   HENT:      Head: Normocephalic and atraumatic.   Eyes:      General: No scleral icterus.     Conjunctiva/sclera: Conjunctivae normal.   Neck:      Thyroid: No thyromegaly.      Vascular: No carotid bruit, hepatojugular reflux or JVD.   Cardiovascular:      Rate and Rhythm: Normal rate and regular rhythm. No extrasystoles are present.     Chest Wall: PMI is not displaced.      Pulses:           Carotid pulses are 2+ on the right side and 2+ on the left side.       Radial pulses are 2+ on the right side and 2+ on the left side.        Dorsalis pedis pulses are 2+ on the right side and 2+ on the left side.        Posterior tibial pulses are 2+ on the right side and 2+ on the left side.      Heart sounds: S1 normal and S2 normal. No murmur heard.     Gallop present. S4 sounds present. No S3 sounds.   Pulmonary:      Effort: No accessory muscle usage or respiratory distress.      Breath  sounds: No decreased breath sounds, wheezing, rhonchi or rales.   Abdominal:      General: Bowel sounds are normal. There is no abdominal bruit.      Palpations: Abdomen is soft. There is no hepatomegaly, splenomegaly or pulsatile mass.      Tenderness: There is no abdominal tenderness.   Musculoskeletal:         General: No tenderness or deformity.      Right lower leg: No edema.      Left lower leg: No edema.   Skin:     General: Skin is warm.      Coloration: Skin is not pale.      Nails: There is no clubbing.   Neurological:      General: No focal deficit present.      Mental Status: He is alert and oriented to person, place, and time.      Sensory: Sensation is intact.      Motor: Motor function is intact.   Psychiatric:         Speech: Speech normal.         Behavior: Behavior normal. Behavior is cooperative.         Labs  Office Visit on 10/03/2023   Component Date Value Ref Range Status    Final Pathologic Diagnosis 10/03/2023    Final                    Value:[facility] DIAGNOSIS:    SKIN, FOREHEAD, SHAVE BIOPSY:  - Invasive squamous cell carcinoma, moderately differentiated, transected.    YENI MARIO M.D.        Report attached.    Performing site:  [address] [address]    &quot;Disclaimer:  This case diagnosis was rendered completely by the outside consultation pathologist and the case is electronically signed by an [facility] pathologist listed below solely to release the report into the medical record.&quot;      Interp By Rober Valdez MD, PhD, Signed on 10/10/2023 at 13:42    Disclaimer 10/03/2023 Unless the case is a 'gross only' or additional testing only, the final diagnosis for each specimen is based on a microscopic examination of appropriate tissue sections.   Final   Lab Visit on 09/29/2023   Component Date Value Ref Range Status    Sodium 09/29/2023 139  136 - 145 mmol/L Final    Potassium 09/29/2023 4.0  3.5 - 5.1 mmol/L Final    Chloride 09/29/2023 102   95 - 110 mmol/L Final    CO2 09/29/2023 25  23 - 29 mmol/L Final    Glucose 09/29/2023 98  70 - 110 mg/dL Final    BUN 09/29/2023 19  8 - 23 mg/dL Final    Creatinine 09/29/2023 1.0  0.5 - 1.4 mg/dL Final    Calcium 09/29/2023 9.3  8.7 - 10.5 mg/dL Final    Total Protein 09/29/2023 7.3  6.0 - 8.4 g/dL Final    Albumin 09/29/2023 3.8  3.5 - 5.2 g/dL Final    Total Bilirubin 09/29/2023 0.5  0.1 - 1.0 mg/dL Final    Comment: For infants and newborns, interpretation of results should be based  on gestational age, weight and in agreement with clinical  observations.    Premature Infant recommended reference ranges:  Up to 24 hours.............<8.0 mg/dL  Up to 48 hours............<12.0 mg/dL  3-5 days..................<15.0 mg/dL  6-29 days.................<15.0 mg/dL      Alkaline Phosphatase 09/29/2023 76  55 - 135 U/L Final    AST 09/29/2023 39  10 - 40 U/L Final    ALT 09/29/2023 89 (H)  10 - 44 U/L Final    eGFR 09/29/2023 >60  >60 mL/min/1.73 m^2 Final    Anion Gap 09/29/2023 12  8 - 16 mmol/L Final    Hemoglobin A1C 09/29/2023 7.1 (H)  4.0 - 5.6 % Final    Comment: ADA Screening Guidelines:  5.7-6.4%  Consistent with prediabetes  >or=6.5%  Consistent with diabetes    High levels of fetal hemoglobin interfere with the HbA1C  assay. Heterozygous hemoglobin variants (HbS, HgC, etc)do  not significantly interfere with this assay.   However, presence of multiple variants may affect accuracy.      Estimated Avg Glucose 09/29/2023 157 (H)  68 - 131 mg/dL Final    Vit D, 25-Hydroxy 09/29/2023 30  30 - 96 ng/mL Final    Comment: Vitamin D deficiency.........<10 ng/mL                              Vitamin D insufficiency......10-29 ng/mL       Vitamin D sufficiency........> or equal to 30 ng/mL  Vitamin D toxicity............>100 ng/mL      PSA, Screen 09/29/2023 0.23  0.00 - 4.00 ng/mL Final    Comment: The testing method is a chemiluminescent microparticle immunoassay   manufactured by [employer] and performed  on the [employer]   or   [employer] system. Values obtained with different assay manufacturers   for   methods may be different and cannot be used interchangeably.  PSA Expected levels:  Hormonal Therapy: <0.05 ng/ml  Prostatectomy: <0.01 ng/ml  Radiation Therapy: <1.00 ng/ml      Iron 09/29/2023 149  45 - 160 ug/dL Final    Transferrin 09/29/2023 263  200 - 375 mg/dL Final    TIBC 09/29/2023 389  250 - 450 ug/dL Final    Saturated Iron 09/29/2023 38  20 - 50 % Final   Admission on 09/21/2023, Discharged on 09/21/2023   Component Date Value Ref Range Status    POCT Glucose 09/21/2023 187 (H)  70 - 110 mg/dL Final    WBC 09/21/2023 8.06  3.90 - 12.70 K/uL Final    RBC 09/21/2023 5.16  4.60 - 6.20 M/uL Final    Hemoglobin 09/21/2023 16.8  14.0 - 18.0 g/dL Final    Hematocrit 09/21/2023 50.2  40.0 - 54.0 % Final    MCV 09/21/2023 97  82 - 98 fL Final    MCH 09/21/2023 32.6 (H)  27.0 - 31.0 pg Final    MCHC 09/21/2023 33.5  32.0 - 36.0 g/dL Final    RDW 09/21/2023 13.3  11.5 - 14.5 % Final    Platelets 09/21/2023 176  150 - 450 K/uL Final    MPV 09/21/2023 10.9  9.2 - 12.9 fL Final    Immature Granulocytes 09/21/2023 1.4 (H)  0.0 - 0.5 % Final    Gran # (ANC) 09/21/2023 5.7  1.8 - 7.7 K/uL Final    Immature Grans (Abs) 09/21/2023 0.11 (H)  0.00 - 0.04 K/uL Final    Comment: Mild elevation in immature granulocytes is non specific and   can be seen in a variety of conditions including stress response,   acute inflammation, trauma and pregnancy. Correlation with other   laboratory and clinical findings is essential.      Lymph # 09/21/2023 1.5  1.0 - 4.8 K/uL Final    Mono # 09/21/2023 0.6  0.3 - 1.0 K/uL Final    Eos # 09/21/2023 0.1  0.0 - 0.5 K/uL Final    Baso # 09/21/2023 0.03  0.00 - 0.20 K/uL Final    nRBC 09/21/2023 0  0 /100 WBC Final    Gran % 09/21/2023 71.2  38.0 - 73.0 % Final    Lymph % 09/21/2023 18.5  18.0 - 48.0 % Final    Mono % 09/21/2023 7.4  4.0 - 15.0 % Final    Eosinophil % 09/21/2023 1.1  0.0 - 8.0 %  Final    Basophil % 09/21/2023 0.4  0.0 - 1.9 % Final    Differential Method 09/21/2023 Automated   Final    Sodium 09/21/2023 137  136 - 145 mmol/L Final    Potassium 09/21/2023 4.0  3.5 - 5.1 mmol/L Final    Specimen slightly hemolyzed    Chloride 09/21/2023 102  95 - 110 mmol/L Final    CO2 09/21/2023 25  23 - 29 mmol/L Final    Glucose 09/21/2023 204 (H)  70 - 110 mg/dL Final    BUN 09/21/2023 28 (H)  8 - 23 mg/dL Final    Creatinine 09/21/2023 1.1  0.5 - 1.4 mg/dL Final    Calcium 09/21/2023 9.6  8.7 - 10.5 mg/dL Final    Total Protein 09/21/2023 6.6  6.0 - 8.4 g/dL Final    Albumin 09/21/2023 3.6  3.5 - 5.2 g/dL Final    Total Bilirubin 09/21/2023 0.4  0.1 - 1.0 mg/dL Final    Comment: For infants and newborns, interpretation of results should be based  on gestational age, weight and in agreement with clinical  observations.    Premature Infant recommended reference ranges:  Up to 24 hours.............<8.0 mg/dL  Up to 48 hours............<12.0 mg/dL  3-5 days..................<15.0 mg/dL  6-29 days.................<15.0 mg/dL      Alkaline Phosphatase 09/21/2023 75  55 - 135 U/L Final    AST 09/21/2023 25  10 - 40 U/L Final    ALT 09/21/2023 51 (H)  10 - 44 U/L Final    eGFR 09/21/2023 >60  >60 mL/min/1.73 m^2 Final    Anion Gap 09/21/2023 10  8 - 16 mmol/L Final    POC Rapid COVID 09/21/2023 Negative  Negative Final     Acceptable 09/21/2023 Yes   Final   Lab Visit on 07/19/2023   Component Date Value Ref Range Status    Prolactin 07/19/2023 19.0  3.5 - 19.4 ng/mL Final    Sodium 07/19/2023 140  136 - 145 mmol/L Final    Potassium 07/19/2023 4.0  3.5 - 5.1 mmol/L Final    Chloride 07/19/2023 105  95 - 110 mmol/L Final    CO2 07/19/2023 27  23 - 29 mmol/L Final    Glucose 07/19/2023 144 (H)  70 - 110 mg/dL Final    BUN 07/19/2023 15  8 - 23 mg/dL Final    Creatinine 07/19/2023 0.9  0.5 - 1.4 mg/dL Final    Calcium 07/19/2023 9.0  8.7 - 10.5 mg/dL Final    Total Protein 07/19/2023 6.5  6.0 - 8.4  g/dL Final    Albumin 07/19/2023 3.4 (L)  3.5 - 5.2 g/dL Final    Total Bilirubin 07/19/2023 0.5  0.1 - 1.0 mg/dL Final    Comment: For infants and newborns, interpretation of results should be based  on gestational age, weight and in agreement with clinical  observations.    Premature Infant recommended reference ranges:  Up to 24 hours.............<8.0 mg/dL  Up to 48 hours............<12.0 mg/dL  3-5 days..................<15.0 mg/dL  6-29 days.................<15.0 mg/dL      Alkaline Phosphatase 07/19/2023 85  55 - 135 U/L Final    AST 07/19/2023 19  10 - 40 U/L Final    ALT 07/19/2023 31  10 - 44 U/L Final    eGFR 07/19/2023 >60  >60 mL/min/1.73 m^2 Final    Anion Gap 07/19/2023 8  8 - 16 mmol/L Final    TSH 07/19/2023 2.405  0.400 - 4.000 uIU/mL Final    WBC 07/19/2023 7.82  3.90 - 12.70 K/uL Final    RBC 07/19/2023 5.09  4.60 - 6.20 M/uL Final    Hemoglobin 07/19/2023 16.1  14.0 - 18.0 g/dL Final    Hematocrit 07/19/2023 49.7  40.0 - 54.0 % Final    MCV 07/19/2023 98  82 - 98 fL Final    MCH 07/19/2023 31.6 (H)  27.0 - 31.0 pg Final    MCHC 07/19/2023 32.4  32.0 - 36.0 g/dL Final    RDW 07/19/2023 13.2  11.5 - 14.5 % Final    Platelets 07/19/2023 191  150 - 450 K/uL Final    MPV 07/19/2023 11.3  9.2 - 12.9 fL Final    Immature Granulocytes 07/19/2023 0.6 (H)  0.0 - 0.5 % Final    Gran # (ANC) 07/19/2023 4.1  1.8 - 7.7 K/uL Final    Immature Grans (Abs) 07/19/2023 0.05 (H)  0.00 - 0.04 K/uL Final    Comment: Mild elevation in immature granulocytes is non specific and   can be seen in a variety of conditions including stress response,   acute inflammation, trauma and pregnancy. Correlation with other   laboratory and clinical findings is essential.      Lymph # 07/19/2023 2.8  1.0 - 4.8 K/uL Final    Mono # 07/19/2023 0.5  0.3 - 1.0 K/uL Final    Eos # 07/19/2023 0.4  0.0 - 0.5 K/uL Final    Baso # 07/19/2023 0.06  0.00 - 0.20 K/uL Final    nRBC 07/19/2023 0  0 /100 WBC Final    Gran % 07/19/2023 52.0  38.0 -  73.0 % Final    Lymph % 07/19/2023 35.3  18.0 - 48.0 % Final    Mono % 07/19/2023 6.8  4.0 - 15.0 % Final    Eosinophil % 07/19/2023 4.5  0.0 - 8.0 % Final    Basophil % 07/19/2023 0.8  0.0 - 1.9 % Final    Differential Method 07/19/2023 Automated   Final    Hemoglobin A1C 07/19/2023 7.0 (H)  4.0 - 5.6 % Final    Comment: ADA Screening Guidelines:  5.7-6.4%  Consistent with prediabetes  >or=6.5%  Consistent with diabetes    High levels of fetal hemoglobin interfere with the HbA1C  assay. Heterozygous hemoglobin variants (HbS, HgC, etc)do  not significantly interfere with this assay.   However, presence of multiple variants may affect accuracy.      Estimated Avg Glucose 07/19/2023 154 (H)  68 - 131 mg/dL Final    Sodium 07/19/2023 139  136 - 145 mmol/L Final    Potassium 07/19/2023 4.0  3.5 - 5.1 mmol/L Final    Chloride 07/19/2023 104  95 - 110 mmol/L Final    CO2 07/19/2023 27  23 - 29 mmol/L Final    Glucose 07/19/2023 143 (H)  70 - 110 mg/dL Final    BUN 07/19/2023 15  8 - 23 mg/dL Final    Creatinine 07/19/2023 0.9  0.5 - 1.4 mg/dL Final    Calcium 07/19/2023 9.2  8.7 - 10.5 mg/dL Final    Anion Gap 07/19/2023 8  8 - 16 mmol/L Final    eGFR 07/19/2023 >60  >60 mL/min/1.73 m^2 Final       Imaging  No results found.    Assessment  1. Acute on chronic diastolic heart failure  Mildly decompensated    2. Atherosclerosis of native coronary artery of native heart without angina pectoris  Stable    3. Primary hypertension  Needs better control  - carvediloL (COREG) 6.25 MG tablet; Take 1 tablet (6.25 mg total) by mouth 2 (two) times daily with meals.  Dispense: 180 tablet; Refill: 3    4. Hypercholesterolemia  Declines treatment  - Lipid Panel; Future  - Comprehensive Metabolic Panel; Future  - Magnesium; Future    5. Supraventricular tachycardia  Controlled on current medical regimen      Plan and Discussion    Will resume carvedilol at 6.25 mg twice daily to help with his blood pressure.  Has tolerated as much as 25  mg twice daily in the past.  Unclear why it was stopped.  Discussed treatment for his lipids.  He declines.  Close follow-up to reassess his blood pressure.    The 10-year ASCVD risk score (Rosalie STOUT, et al., 2019) is: 43.9%    Values used to calculate the score:      Age: 68 years      Sex: Male      Is Non- : No      Diabetic: Yes      Tobacco smoker: Yes      Systolic Blood Pressure: 132 mmHg      Is BP treated: Yes      HDL Cholesterol: 44 mg/dL      Total Cholesterol: 172 mg/dL     Follow Up  Follow up in about 6 weeks (around 12/5/2023).      Shemar Dempsey MD

## 2023-10-25 DIAGNOSIS — E11.65 TYPE 2 DIABETES MELLITUS WITH HYPERGLYCEMIA, WITH LONG-TERM CURRENT USE OF INSULIN: Primary | ICD-10-CM

## 2023-10-25 DIAGNOSIS — Z79.4 TYPE 2 DIABETES MELLITUS WITH HYPERGLYCEMIA, WITH LONG-TERM CURRENT USE OF INSULIN: Primary | ICD-10-CM

## 2023-10-25 RX ORDER — INSULIN ADMIN. SUPPLIES
1 INSULIN PEN (EA) SUBCUTANEOUS ONCE
Qty: 1 EACH | Refills: 0 | Status: SHIPPED | OUTPATIENT
Start: 2023-10-25 | End: 2023-10-27

## 2023-10-25 RX ORDER — INSULIN DEGLUDEC 200 U/ML
56 INJECTION, SOLUTION SUBCUTANEOUS DAILY
Qty: 3 PEN | Refills: 11 | Status: SHIPPED | OUTPATIENT
Start: 2023-10-25 | End: 2023-11-24 | Stop reason: SDUPTHER

## 2023-10-25 RX ORDER — INSULIN ASPART 100 [IU]/ML
INJECTION, SOLUTION INTRAVENOUS; SUBCUTANEOUS
Qty: 14 EACH | Refills: 3 | Status: SHIPPED | OUTPATIENT
Start: 2023-10-25 | End: 2023-11-17

## 2023-10-25 RX ORDER — INSULIN ADMIN. SUPPLIES
INSULIN PEN (EA) SUBCUTANEOUS
Qty: 1 EACH | Refills: 0 | Status: SHIPPED | OUTPATIENT
Start: 2023-10-25 | End: 2023-11-17 | Stop reason: SDUPTHER

## 2023-10-26 ENCOUNTER — PROCEDURE VISIT (OUTPATIENT)
Dept: DERMATOLOGY | Facility: CLINIC | Age: 68
End: 2023-10-26
Payer: MEDICARE

## 2023-10-26 VITALS
DIASTOLIC BLOOD PRESSURE: 82 MMHG | BODY MASS INDEX: 30.61 KG/M2 | HEART RATE: 86 BPM | WEIGHT: 226 LBS | SYSTOLIC BLOOD PRESSURE: 164 MMHG | HEIGHT: 72 IN

## 2023-10-26 DIAGNOSIS — C44.329 SQUAMOUS CELL CARCINOMA OF FOREHEAD: Primary | ICD-10-CM

## 2023-10-26 PROCEDURE — 99499 UNLISTED E&M SERVICE: CPT | Mod: S$GLB,,, | Performed by: DERMATOLOGY

## 2023-10-26 PROCEDURE — 17312: ICD-10-PCS | Mod: S$GLB,,, | Performed by: DERMATOLOGY

## 2023-10-26 PROCEDURE — 17311 MOHS 1 STAGE H/N/HF/G: CPT | Mod: S$GLB,,, | Performed by: DERMATOLOGY

## 2023-10-26 PROCEDURE — 17311: ICD-10-PCS | Mod: S$GLB,,, | Performed by: DERMATOLOGY

## 2023-10-26 PROCEDURE — 13132 PR RECMPL WND HEAD,FAC,HAND 2.6-7.5 CM: ICD-10-PCS | Mod: 51,S$GLB,, | Performed by: DERMATOLOGY

## 2023-10-26 PROCEDURE — 17312 MOHS ADDL STAGE: CPT | Mod: S$GLB,,, | Performed by: DERMATOLOGY

## 2023-10-26 PROCEDURE — 13132 CMPLX RPR F/C/C/M/N/AX/G/H/F: CPT | Mod: 51,S$GLB,, | Performed by: DERMATOLOGY

## 2023-10-26 PROCEDURE — 99499 NO LOS: ICD-10-PCS | Mod: S$GLB,,, | Performed by: DERMATOLOGY

## 2023-10-26 RX ORDER — DOXYCYCLINE HYCLATE 100 MG
100 TABLET ORAL 2 TIMES DAILY
Qty: 14 TABLET | Refills: 0 | Status: SHIPPED | OUTPATIENT
Start: 2023-10-26 | End: 2023-11-02

## 2023-10-26 NOTE — PROGRESS NOTES
PROCEDURE: Mohs' Micrographic Surgery    INDICATION: Location in non-mask areas of face where maximum conservation of tumor-free tissue is needed. Size > 1.0 cm on face. Biopsy-proven skin cancer of cosmetically and functionally important areas, including head, neck, genital, hand, foot, or areas known for having difficulty in healing, such as the lower anterior legs. Tumors with aggressive clinical behavior (rapidly growing, greater than 1 cm in diameter).    REFERRING PROVIDER: Jarred Gibbs MD    CASE NUMBER:     ANESTHETIC: 6 cc 0.5% Lidocaine with Epi 1:200,000 mixed 1:1 with 0.5% Bupivacaine and 1.5 cc 1% Lidocaine with Epinephrine 1:200,000    SURGICAL PREP: Hibiclens    SURGEON: Pamela Lyman MD    ASSISTANTS: Moon Ball PA-C, Alyssa Roche MA, and Ayana Mayen, Surg Tech    PREOPERATIVE DIAGNOSIS: squamous cell carcinoma- invasive, moderately differentiated    POSTOPERATIVE DIAGNOSIS: squamous cell carcinoma- well differentiated; SCCIS    PATHOLOGIC DIAGNOSIS: squamous cell carcinoma- invasive, moderately differentiated, well differentiated; SCCIS    HISTOLOGY OF SPECIMENS IN FIRST STAGE:   Debulking tumor confirms well differentiated squamous cell carcinoma.  Tumor Type: Tumor seen. Squamous cell carcinoma in situ: Epidermis with full-thickness atypia and variable epidermal maturation.  Well-differentiated squamous cell carcinoma: Proliferation of squamous cells exhibiting atypia and infiltrating within the dermis.   Depth of Invasion: epidermis and dermis  Perineural Invasion: No    HISTOLOGY OF SPECIMENS IN SUBSEQUENT STAGES:  Tumor Type: Tumor seen. Squamous cell carcinoma in situ: Epidermis with full-thickness atypia and variable epidermal maturation.   Depth of Invasion: epidermis  Perineural Invasion: No    STAGES OF MOHS' SURGERY PERFORMED: 3    TUMOR-FREE PLANE ACHIEVED: Yes    HEMOSTASIS: electrocoagulation     SPECIMENS: 8 (5 in stage A, 2 in stage B, and 1 in stage C)    LOCATION:  forehead (right superolateral). Location verified with Dr. Gibbs's clinical photograph. Patient also verified location with hand held mirror.    INITIAL LESION SIZE: 1.6 x 2.6 cm    FINAL DEFECT SIZE: 2.6 x 3.5 cm    WOUND REPAIR/DISPOSITION: The patient tolerated Mohs' Micrographic Surgery for a squamous cell carcinoma very well. When the tumor was completely removed, a repair of the surgical defect was undertaken.    PROCEDURE: Complex Linear Repair    INDICATION: Status post Mohs' Micrographic Surgery for squamous cell carcinoma.    CASE NUMBER:     SURGEON: Pamela Lyman MD    ASSISTANTS: Moon Ball PA-C, Alyssa Roche MA, and [facility] Surg Tech    ANESTHETIC: 4.5 cc 1% Lidocaine with Epinephrine 1:200,000    SURGICAL PREP: Hibiclens, prepped by Alyssa Roche MA    LOCATION: forehead (right superolateral)    DEFECT SIZE: 2.6 x 3.5 cm    WOUND REPAIR/DISPOSITION:  After the patient's carcinoma had been completely removed with Mohs' Micrographic Surgery, a repair of the surgical defect was undertaken. The patient was returned to the operating suite where the area of right superolateral forehead was prepped, draped, and anesthetized in the usual sterile fashion. The wound was widely undermined in all directions. The wound was undermined to a distance at least the maximum width of the defect as measured perpendicular to the closure line along at least one entire edge of the defect, in this case 2.5 cm. Then, electrocoagulation was used to obtain meticulous hemostasis. 4-0 Vicryl buried vertical mattress sutures were placed into the subcutaneous and dermal plane to close the wound and hammad the cutaneous wound edge. Bilateral dog ears were identified and were removed by a standard Burow's triangle technique. The cutaneous wound edges were closed using interrupted 5-0 Prolene suture.    The patient tolerated the procedure well.    The area was cleaned and dressed appropriately and the patient was given  wound care instructions, as well as appointment for follow-up evaluation and suture removal in 7 days. Patient already has at home Norco 10 mg prn pain and was placed on doxycycline 100 mg BID x 7 days. Advised patient to take doxycycline with food, not one hour prior to bedtime, and to apply additional sunscreen/wear hat while outdoors as patient is more photosensitive.    LENGTH OF REPAIR: 6.7 cm    Vitals:    10/26/23 0956 10/26/23 1359   BP: 128/80 (!) 164/82   BP Location: Left arm Left arm   Patient Position: Sitting Sitting   BP Method: Medium (Automatic) Small (Manual)   Pulse: 104 86   Weight: 102.5 kg (226 lb)    Height: 6' (1.829 m)

## 2023-10-28 ENCOUNTER — PATIENT MESSAGE (OUTPATIENT)
Dept: CARDIOLOGY | Facility: CLINIC | Age: 68
End: 2023-10-28
Payer: MEDICARE

## 2023-10-30 ENCOUNTER — TELEPHONE (OUTPATIENT)
Dept: DERMATOLOGY | Facility: CLINIC | Age: 68
End: 2023-10-30
Payer: MEDICARE

## 2023-10-30 ENCOUNTER — PATIENT MESSAGE (OUTPATIENT)
Dept: ENDOCRINOLOGY | Facility: CLINIC | Age: 68
End: 2023-10-30
Payer: MEDICARE

## 2023-10-30 NOTE — TELEPHONE ENCOUNTER
"Spoke w/pt , states "I am really not concerned with the swelling or sensation, but the post op paper said to call if you have this, this, and this". I asked how often he applied ice, he stated he d/c after the first day. Pt denies bending over or anything strenuous. States he is keeping his head elevated. Instructed to continue w/ice regimen to decrease swelling, along w/ OTC pain meds. Informed that the unusual sensation is to be expected for a period of time however it if becomes very concerning to let us know. Pt appreciative of return call, and verb understanding. Pt will RTC 11/7 for s/r.               ----- Message from Berkley Galo sent at 10/30/2023 12:24 PM CDT -----  Regarding: Post-op symptoms  Contact: pt @[phone]  Patient states his right orbit is swollen,  and no sensation of feeling on scalp(top of pt's head) . Pt wanted to know if that is to be expected or is that a concern? Please c/b to advise..Thanks    "

## 2023-11-06 RX ORDER — DIGOXIN 125 MCG
0.12 TABLET ORAL
Qty: 90 TABLET | Refills: 3 | Status: SHIPPED | OUTPATIENT
Start: 2023-11-06

## 2023-11-07 ENCOUNTER — OFFICE VISIT (OUTPATIENT)
Dept: DERMATOLOGY | Facility: CLINIC | Age: 68
End: 2023-11-07
Payer: MEDICARE

## 2023-11-07 DIAGNOSIS — Z09 POSTOP CHECK: Primary | ICD-10-CM

## 2023-11-07 PROCEDURE — 99024 POSTOP FOLLOW-UP VISIT: CPT | Mod: S$GLB,,, | Performed by: DERMATOLOGY

## 2023-11-07 PROCEDURE — 99999 PR PBB SHADOW E&M-EST. PATIENT-LVL I: ICD-10-PCS | Mod: PBBFAC,,, | Performed by: DERMATOLOGY

## 2023-11-07 PROCEDURE — 99999 PR PBB SHADOW E&M-EST. PATIENT-LVL I: CPT | Mod: PBBFAC,,, | Performed by: DERMATOLOGY

## 2023-11-07 PROCEDURE — 3288F FALL RISK ASSESSMENT DOCD: CPT | Mod: CPTII,S$GLB,, | Performed by: DERMATOLOGY

## 2023-11-07 PROCEDURE — 1101F PT FALLS ASSESS-DOCD LE1/YR: CPT | Mod: CPTII,S$GLB,, | Performed by: DERMATOLOGY

## 2023-11-07 PROCEDURE — 4010F PR ACE/ARB THEARPY RXD/TAKEN: ICD-10-PCS | Mod: CPTII,S$GLB,, | Performed by: DERMATOLOGY

## 2023-11-07 PROCEDURE — 1101F PR PT FALLS ASSESS DOC 0-1 FALLS W/OUT INJ PAST YR: ICD-10-PCS | Mod: CPTII,S$GLB,, | Performed by: DERMATOLOGY

## 2023-11-07 PROCEDURE — 1126F PR PAIN SEVERITY QUANTIFIED, NO PAIN PRESENT: ICD-10-PCS | Mod: CPTII,S$GLB,, | Performed by: DERMATOLOGY

## 2023-11-07 PROCEDURE — 3066F NEPHROPATHY DOC TX: CPT | Mod: CPTII,S$GLB,, | Performed by: DERMATOLOGY

## 2023-11-07 PROCEDURE — 3061F NEG MICROALBUMINURIA REV: CPT | Mod: CPTII,S$GLB,, | Performed by: DERMATOLOGY

## 2023-11-07 PROCEDURE — 3066F PR DOCUMENTATION OF TREATMENT FOR NEPHROPATHY: ICD-10-PCS | Mod: CPTII,S$GLB,, | Performed by: DERMATOLOGY

## 2023-11-07 PROCEDURE — 99024 PR POST-OP FOLLOW-UP VISIT: ICD-10-PCS | Mod: S$GLB,,, | Performed by: DERMATOLOGY

## 2023-11-07 PROCEDURE — 3288F PR FALLS RISK ASSESSMENT DOCUMENTED: ICD-10-PCS | Mod: CPTII,S$GLB,, | Performed by: DERMATOLOGY

## 2023-11-07 PROCEDURE — 1126F AMNT PAIN NOTED NONE PRSNT: CPT | Mod: CPTII,S$GLB,, | Performed by: DERMATOLOGY

## 2023-11-07 PROCEDURE — 4010F ACE/ARB THERAPY RXD/TAKEN: CPT | Mod: CPTII,S$GLB,, | Performed by: DERMATOLOGY

## 2023-11-07 PROCEDURE — 3051F HG A1C>EQUAL 7.0%<8.0%: CPT | Mod: CPTII,S$GLB,, | Performed by: DERMATOLOGY

## 2023-11-07 PROCEDURE — 3061F PR NEG MICROALBUMINURIA RESULT DOCUMENTED/REVIEW: ICD-10-PCS | Mod: CPTII,S$GLB,, | Performed by: DERMATOLOGY

## 2023-11-07 PROCEDURE — 3051F PR MOST RECENT HEMOGLOBIN A1C LEVEL 7.0 - < 8.0%: ICD-10-PCS | Mod: CPTII,S$GLB,, | Performed by: DERMATOLOGY

## 2023-11-07 NOTE — PROGRESS NOTES
68 y.o. male patient is here for suture removal following Mohs' surgery.    Patient reports no problems with forehead incision.    WOUND PE:  The forehead sutures intact. Wound healing well. Good skin edges. No signs or symptoms of infection.      IMPRESSION:  Healing operative site from Mohs' surgery SCC forehead s/p Mohs with CLC, postop day #7.    PLAN:  Sutures removed today by  Angelique James RN . Steri-strips applied.  Continue wound care.  Keep moist with Aquaphor.  Call if any issues arise    RTC:  In 3-6 months with  Jarred Gibbs MD  for skin check or sooner if new concern arises.

## 2023-11-10 ENCOUNTER — PATIENT MESSAGE (OUTPATIENT)
Dept: ENDOCRINOLOGY | Facility: CLINIC | Age: 68
End: 2023-11-10
Payer: MEDICARE

## 2023-11-17 ENCOUNTER — PATIENT MESSAGE (OUTPATIENT)
Dept: ENDOCRINOLOGY | Facility: CLINIC | Age: 68
End: 2023-11-17
Payer: MEDICARE

## 2023-11-17 DIAGNOSIS — E11.65 TYPE 2 DIABETES MELLITUS WITH HYPERGLYCEMIA, WITH LONG-TERM CURRENT USE OF INSULIN: Primary | ICD-10-CM

## 2023-11-17 DIAGNOSIS — Z79.4 TYPE 2 DIABETES MELLITUS WITH HYPERGLYCEMIA, WITH LONG-TERM CURRENT USE OF INSULIN: ICD-10-CM

## 2023-11-17 DIAGNOSIS — Z79.4 TYPE 2 DIABETES MELLITUS WITH HYPERGLYCEMIA, WITH LONG-TERM CURRENT USE OF INSULIN: Primary | ICD-10-CM

## 2023-11-17 DIAGNOSIS — E11.65 TYPE 2 DIABETES MELLITUS WITH HYPERGLYCEMIA, WITH LONG-TERM CURRENT USE OF INSULIN: ICD-10-CM

## 2023-11-17 RX ORDER — INSULIN ADMIN. SUPPLIES
INSULIN PEN (EA) SUBCUTANEOUS
Qty: 1 EACH | Refills: 0 | Status: SHIPPED | OUTPATIENT
Start: 2023-11-17 | End: 2023-11-24

## 2023-11-17 RX ORDER — INSULIN PEN,REUSABLE,BT LISPRO
INSULIN PEN (EA) SUBCUTANEOUS
Qty: 1 EACH | Refills: 1 | Status: SHIPPED | OUTPATIENT
Start: 2023-11-17 | End: 2023-11-24

## 2023-11-17 RX ORDER — INSULIN PEN,REUSABLE,BT LISPRO
INSULIN PEN (EA) SUBCUTANEOUS
Qty: 1 EACH | Refills: 1 | Status: SHIPPED | OUTPATIENT
Start: 2023-11-17 | End: 2023-11-17 | Stop reason: SDUPTHER

## 2023-11-24 ENCOUNTER — OFFICE VISIT (OUTPATIENT)
Dept: ENDOCRINOLOGY | Facility: CLINIC | Age: 68
End: 2023-11-24
Payer: MEDICARE

## 2023-11-24 ENCOUNTER — PATIENT MESSAGE (OUTPATIENT)
Dept: ENDOCRINOLOGY | Facility: CLINIC | Age: 68
End: 2023-11-24

## 2023-11-24 DIAGNOSIS — I10 ESSENTIAL HYPERTENSION: ICD-10-CM

## 2023-11-24 DIAGNOSIS — M81.0 OSTEOPOROSIS, UNSPECIFIED OSTEOPOROSIS TYPE, UNSPECIFIED PATHOLOGICAL FRACTURE PRESENCE: ICD-10-CM

## 2023-11-24 DIAGNOSIS — E11.65 TYPE 2 DIABETES MELLITUS WITH HYPERGLYCEMIA, WITH LONG-TERM CURRENT USE OF INSULIN: Primary | ICD-10-CM

## 2023-11-24 DIAGNOSIS — E78.5 HYPERLIPIDEMIA, UNSPECIFIED HYPERLIPIDEMIA TYPE: ICD-10-CM

## 2023-11-24 DIAGNOSIS — E66.09 CLASS 1 OBESITY DUE TO EXCESS CALORIES WITH SERIOUS COMORBIDITY AND BODY MASS INDEX (BMI) OF 34.0 TO 34.9 IN ADULT: ICD-10-CM

## 2023-11-24 DIAGNOSIS — F17.200 TOBACCO USE DISORDER: ICD-10-CM

## 2023-11-24 DIAGNOSIS — Z79.4 TYPE 2 DIABETES MELLITUS WITH HYPERGLYCEMIA, WITH LONG-TERM CURRENT USE OF INSULIN: Primary | ICD-10-CM

## 2023-11-24 PROCEDURE — 3051F PR MOST RECENT HEMOGLOBIN A1C LEVEL 7.0 - < 8.0%: ICD-10-PCS | Mod: CPTII,95,, | Performed by: NURSE PRACTITIONER

## 2023-11-24 PROCEDURE — 99214 OFFICE O/P EST MOD 30 MIN: CPT | Mod: 25,95,, | Performed by: NURSE PRACTITIONER

## 2023-11-24 PROCEDURE — 1159F MED LIST DOCD IN RCRD: CPT | Mod: CPTII,95,, | Performed by: NURSE PRACTITIONER

## 2023-11-24 PROCEDURE — 3066F NEPHROPATHY DOC TX: CPT | Mod: CPTII,95,, | Performed by: NURSE PRACTITIONER

## 2023-11-24 PROCEDURE — 3066F PR DOCUMENTATION OF TREATMENT FOR NEPHROPATHY: ICD-10-PCS | Mod: CPTII,95,, | Performed by: NURSE PRACTITIONER

## 2023-11-24 PROCEDURE — 1160F RVW MEDS BY RX/DR IN RCRD: CPT | Mod: CPTII,95,, | Performed by: NURSE PRACTITIONER

## 2023-11-24 PROCEDURE — 3051F HG A1C>EQUAL 7.0%<8.0%: CPT | Mod: CPTII,95,, | Performed by: NURSE PRACTITIONER

## 2023-11-24 PROCEDURE — 1159F PR MEDICATION LIST DOCUMENTED IN MEDICAL RECORD: ICD-10-PCS | Mod: CPTII,95,, | Performed by: NURSE PRACTITIONER

## 2023-11-24 PROCEDURE — 3061F PR NEG MICROALBUMINURIA RESULT DOCUMENTED/REVIEW: ICD-10-PCS | Mod: CPTII,95,, | Performed by: NURSE PRACTITIONER

## 2023-11-24 PROCEDURE — 99214 PR OFFICE/OUTPT VISIT, EST, LEVL IV, 30-39 MIN: ICD-10-PCS | Mod: 25,95,, | Performed by: NURSE PRACTITIONER

## 2023-11-24 PROCEDURE — 95251 CONT GLUC MNTR ANALYSIS I&R: CPT | Mod: NDTC,S$GLB,, | Performed by: NURSE PRACTITIONER

## 2023-11-24 PROCEDURE — 4010F PR ACE/ARB THEARPY RXD/TAKEN: ICD-10-PCS | Mod: CPTII,95,, | Performed by: NURSE PRACTITIONER

## 2023-11-24 PROCEDURE — 1160F PR REVIEW ALL MEDS BY PRESCRIBER/CLIN PHARMACIST DOCUMENTED: ICD-10-PCS | Mod: CPTII,95,, | Performed by: NURSE PRACTITIONER

## 2023-11-24 PROCEDURE — 4010F ACE/ARB THERAPY RXD/TAKEN: CPT | Mod: CPTII,95,, | Performed by: NURSE PRACTITIONER

## 2023-11-24 PROCEDURE — 3061F NEG MICROALBUMINURIA REV: CPT | Mod: CPTII,95,, | Performed by: NURSE PRACTITIONER

## 2023-11-24 PROCEDURE — 95251 PR GLUCOSE MONITOR, 72 HOUR, PHYS INTERP: ICD-10-PCS | Mod: NDTC,S$GLB,, | Performed by: NURSE PRACTITIONER

## 2023-11-24 RX ORDER — TIRZEPATIDE 2.5 MG/.5ML
2.5 INJECTION, SOLUTION SUBCUTANEOUS
Qty: 4 PEN | Refills: 11 | Status: SHIPPED | OUTPATIENT
Start: 2023-11-24 | End: 2024-01-05

## 2023-11-24 RX ORDER — SULFAMETHOXAZOLE AND TRIMETHOPRIM 800; 160 MG/1; MG/1
1 TABLET ORAL 2 TIMES DAILY
Qty: 14 TABLET | Refills: 0 | Status: SHIPPED | OUTPATIENT
Start: 2023-11-24

## 2023-11-24 RX ORDER — SULFAMETHOXAZOLE AND TRIMETHOPRIM 800; 160 MG/1; MG/1
1 TABLET ORAL 2 TIMES DAILY
Qty: 14 TABLET | Refills: 0 | Status: SHIPPED | OUTPATIENT
Start: 2023-11-24 | End: 2023-11-24 | Stop reason: SDUPTHER

## 2023-11-24 RX ORDER — PEN NEEDLE, DIABETIC 31 GX5/16"
NEEDLE, DISPOSABLE MISCELLANEOUS
Qty: 400 EACH | Refills: 3 | Status: SHIPPED | OUTPATIENT
Start: 2023-11-24

## 2023-11-24 RX ORDER — INSULIN DEGLUDEC 200 U/ML
60 INJECTION, SOLUTION SUBCUTANEOUS DAILY
Qty: 3 PEN | Refills: 11 | Status: SHIPPED | OUTPATIENT
Start: 2023-11-24 | End: 2024-11-23

## 2023-11-24 NOTE — ASSESSMENT & PLAN NOTE
BMI Readings from Last 1 Encounters:   10/26/23 30.65 kg/m²    complicated by HTN, HLD, diabetes   encourage pt to work on healthy diet, increase exercise as tolerated.  Unable to afford Mounjaro

## 2023-11-24 NOTE — PROGRESS NOTES
Subjective:      Chief Complaint: No chief complaint on file.    HPI: Nithin Wagner is a 68 y.o. male with diabetes, dyslipidemia, hypertension, diastolic heart failure, PVD, RA, vitamin D deficiency, osteoporosis, obesity, and tobacco use who is here for a follow-up evaluation for diabetes. Last seen by me in Oct 2023.    The patient location is: at home  The chief complaint leading to consultation is: diabetes    Visit type: audiovisual    Face to Face time with patient: 25 minutes   40 minutes of total time spent on the encounter, which includes face to face time and non-face to face time preparing to see the patient (eg, review of tests), Obtaining and/or reviewing separately obtained history, Documenting clinical information in the electronic or other health record, Independently interpreting results (not separately reported) and communicating results to the patient/family/caregiver, or Care coordination (not separately reported).    Each patient to whom he or she provides medical services by telemedicine is:  (1) informed of the relationship between the physician and patient and the respective role of any other health care provider with respect to management of the patient; and (2) notified that he or she may decline to receive medical services by telemedicine and may withdraw from such care at any time.    Patient reported in 2007, had heart issues (atrial myxoma), surgery, issues with anesthesia. Then had pain, f/u showed lung issues, then surgery 2008. Later RA, on mtx, other meds. Including steroids.    States he has lost around 70 pounds since Jan 2022 -diet and addition of Mounjaro. 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 We restarted Mounjaro last visit but he did not start taking and increased long acting insulin.   He had Mohs procedure since last visit.   States he had a fall over the last week, balance issues when blood sugars are >180. Does report he  scratched leg and arm.     Since his last visit, we tried to obtain ECHO but no longer manufactured. He is now using INPEN.     He has stopped using omnipod-cost prohibitive.    He is taking prednisone 3AM 15 mg; 9AM 2.5 mg; 3PM takes 5 mg.   At his last visit, he informed us he wanted to decrease wean steroids. He was on prednisone 15 mg in AM and 15 mg in PM. He was down to prednisone 20 mg daily and reports he started getting CFS. He increased steroids back up to 27.5 mg daily but he would like to wean again.      With regards to the diabetes:  Diagnosed with T2DM since around 2910-8685. Had symptoms, sugar 300. Metformin initially, other pills, GLP1, then lately insulin.     Known complications:    Retinopathy- Denies; has upcoming appt    Nephropathy- No    Neuropathy- No    CAD? Yes, hx stents. 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 He is rotating injection sites. He is giving novolog before a meal 45 minutes     Missed doses-denies     Other medications tried:   onglyza-caused HF   metformin. Wasn't helping as much   other meds   Trulicity: 4.5 mg/week. Tuesdays. Stopped in the hospital   Basal insulin: Tresiba 44 units daily   Prandial insulin: Novolog 14 units TIDWM plus scale.     #4 times a day testing  Dexcom   See media tab      Log reviewed: Blood sugars are very variable. He is giving several doses of short acting insulin daily due to steroids but he is chasing high and low blood sugars.     Hypoglycemic event- He corrects a blood sugar dropping to 100 States occurring 4 times daily   Knows how to correct with 15 grams of carbs- juice, coke, or a  peppermint.     Dietary recall: He is working on diet.  Eats 3 meals a day.   Trying to do low carb diet.   Snacks: cheese and crackers or sardines   Drinks: caffeine in coffee spikes blood sugar and was drinking 3 pots in the past     Exercise - He is trying to incorporate exercise -walking and using rubber bands     Education - last visit: 6/2023    Has a Medic alert tag- has pendant for falls   Has been having  more pain when walking lately  Glucagon/Baqsimi- does not want    Diabetes Management Status  Statin: Not taking- did not tolerate  ACE/ARB: Taking    Lab Results   Component Value Date    HGBA1C 7.1 (H) 09/29/2023    HGBA1C 7.0 (H) 07/19/2023    HGBA1C 7.4 (H) 04/19/2023     Screening or Prevention Patient's value Goal Complete/Controlled?   HgA1C Testing and Control   Lab Results   Component Value Date    HGBA1C 7.1 (H) 09/29/2023      Annually/Less than 8% Yes   Lipid profile : 04/19/2023 Annually Yes   LDL control Lab Results   Component Value Date    LDLCALC 106.6 04/19/2023    Annually/Less than 100 mg/dl  No   Nephropathy screening Lab Results   Component Value Date    LABMICR 6.0 04/19/2023     Lab Results   Component Value Date    PROTEINUA Negative 01/26/2023    Annually Yes   Blood pressure BP Readings from Last 1 Encounters:   10/26/23 (!) 164/82    Less than 140/90 Yes   Dilated retinal exam : 10/09/2023 Annually Yes   Foot exam   : 05/06/2021 Annually No     He is seeing hepatology      Latest Reference Range & Units 09/21/23 11:54 09/29/23 15:01   AST 10 - 40 U/L 25 39   ALT 10 - 44 U/L 51 (H) 89 (H)   (H): Data is abnormally high    With regards to osteoporosis,     Based on FRAX score     He is on ergo 50k weekly and MVI with 1000 IU daily  He has calcium in diet; eating more cheese and dairy     current medication: none and does not want treatment     Weight bearing exercise- Walking and also using rubber bands for strength training   Recent falls- stumbles and grabs himself once every 2  to 3 days; actual fall in Feb/March 2023    They have made fall precautions in house   Dental work- denies     No recent fractures   No significant height loss (>2 inches)    tob use -  1 ppd   etoh use-denies      No current diarrhea or h/o malabsorption  + chronic exposure to steroid therapy,  - anticoagulants, proton pump inhibitors or antiseizure medications.     + GERD, indigestion,   Denies gastric bypass surgery.     Denies history of thyroid disease, anemia, kidney stones or kidney disease.     Denies active malignancy, history of malignancy the involved the bone, prior radiation treatment, or unexplained elevations of alk phos on labs     Bone density    7/5/23 DXA  FINDINGS:  The bone mineral density measured from L1 through L4 is 1.307g/cm2.  This corresponds to a T score of 0.6 and a Z score of 0.5.  Decrease in bone mineral density by 4%.     The bone mineral density within the left femoral neck measures 0.816 g/cm2.  This corresponds to a T score of -2.0 and a Z score of -1.1.     The bone mineral density within the right femoral neck measures 0.855 g/cm2.  This corresponds to a T score of -1.7 and a Z score of -0.8.  Decrease in bone mineral density by 10%.     FRAX RESULTS:     10-year Probability of Fracture:   Major Osteoporotic Fracture 10.3%.   Hip Fracture 4.1%.   Impression:     Osteopenia both hips with decrease in bone mineral density by 10%.   Latest Reference Range & Units 10/21/22 15:15   Vit D, 25-Hydroxy 30 - 96 ng/mL 20 (L)   (L): Data is abnormally low    With regards to dyslipidemia,   Unable to tolerate statin- allergy  States he has tried several in the past and caused heart arrhythmia   Does not want to try praulent or repatha      Latest Reference Range & Units 03/23/23 08:15 04/19/23 10:02   Cholesterol 120 - 199 mg/dL 183 172   HDL 40 - 75 mg/dL 47 44   HDL/Cholesterol Ratio 20.0 - 50.0 % 25.7 25.6   LDL Cholesterol External 63.0 - 159.0 mg/dL 108.8 106.6   Non-HDL Cholesterol  mg/dL 136 128   Total Cholesterol/HDL Ratio 2.0 - 5.0  3.9 3.9   Triglycerides 30 - 150 mg/dL 136 107     With regards to elevated prolactin-now normal,    Now normal    Currently pt reports no galactorrhea  + diplopia that recently started around 2 months ago  - chronic headaches    No unexplained weight changes.  + fatigue  He does report some sleep problems. Has pulse ox- decreasing O2. States he has CPAP but unable to tolerate mask. States he has scarring on trachea from intubation in the past    Denies cold intolerance   No nausea or vomiting  + orthostatic symptoms     The patient is not currently using any medication known to cause hyperprolactinemia such as: antipsychotics (risperidone, phenothiazines, haloperidol and butyrophenones),gastric motility drugs (metoclopramide and domperidone), or antihypertensives (methyldopa, reserpine, and verapamil).     Latest Reference Range & Units 06/21/22 07:23   Prolactin 3.5 - 19.4 ng/mL 28.8 (H)      Latest Reference Range & Units 06/21/22 07:23   FSH 0.95 - 11.95 mIU/mL 5.93   LH 0.6 - 12.1 mIU/mL 2.4   Prolactin 3.5 - 19.4 ng/mL 28.8 (H)   Sex Hormone Binding Globulin 22 - 77 nmol/L 79 (H)   Testosterone 250 - 1100 ng/dL 542   Testosterone, Bioavailable 110.0 - 575.0 ng/dL 63.7 (L)   Testosterone, Free 46.0 - 224.0 pg/mL 33.8 (L)      Latest Reference Range & Units 06/21/22 07:23 07/19/23 09:17   Prolactin 3.5 - 19.4 ng/mL 28.8 (H) 19.0   (H): Data is abnormally high    Reviewed past medical, family, social history and updated as appropriate.     Review of Systems   Constitutional:  Positive for fatigue. Negative for unexpected weight change.   Eyes:  Negative for visual disturbance.   Endocrine: Positive for polyphagia. Negative for polydipsia and polyuria.        Hair loss     As above    Objective:     There were no vitals filed for this visit.    Prior vitals:  BP Readings from Last 5 Encounters:   10/26/23 (!) 164/82   10/24/23 132/74   09/21/23 (!) 161/95  "  06/22/23 126/70   04/26/23 136/75     Physical Exam  Vitals reviewed.   Constitutional:       Appearance: He is obese.   Neurological:      Mental Status: He is alert.       Wt Readings from Last 10 Encounters:   10/26/23 0956 102.5 kg (226 lb)   10/24/23 1411 108.7 kg (239 lb 9.6 oz)   09/21/23 1052 99.8 kg (220 lb)   06/22/23 1422 98.4 kg (217 lb)   04/26/23 1254 94.5 kg (208 lb 5.4 oz)   04/25/23 1238 94.5 kg (208 lb 5.4 oz)   03/31/23 1551 94.3 kg (208 lb)   03/22/23 1347 94.4 kg (208 lb 1.8 oz)   03/01/23 1441 94 kg (207 lb 3.7 oz)   03/01/23 1404 94.3 kg (208 lb)     Lab Results   Component Value Date    HGBA1C 7.1 (H) 09/29/2023     Lab Results   Component Value Date    CHOL 172 04/19/2023    HDL 44 04/19/2023    LDLCALC 106.6 04/19/2023    TRIG 107 04/19/2023    CHOLHDL 25.6 04/19/2023     Lab Results   Component Value Date     09/29/2023    K 4.0 09/29/2023     09/29/2023    CO2 25 09/29/2023    GLU 98 09/29/2023    BUN 19 09/29/2023    CREATININE 1.0 09/29/2023    CALCIUM 9.3 09/29/2023    PROT 7.3 09/29/2023    ALBUMIN 3.8 09/29/2023    BILITOT 0.5 09/29/2023    ALKPHOS 76 09/29/2023    AST 39 09/29/2023    ALT 89 (H) 09/29/2023    ANIONGAP 12 09/29/2023    ESTGFRAFRICA >60 07/21/2022    EGFRNONAA >60 07/21/2022    TSH 2.405 07/19/2023      Lab Results   Component Value Date    MICALBCREAT 7.6 04/19/2023     Assessment/Plan:     1. Type 2 diabetes mellitus with hyperglycemia, with long-term current use of insulin  Hemoglobin A1C    tirzepatide (MOUNJARO) 2.5 mg/0.5 mL PnIj    insulin degludec (TRESIBA FLEXTOUCH U-200) 200 unit/mL (3 mL) insulin pen    BD ULTRA-FINE RAHUL PEN NEEDLE 32 gauge x 5/32" Ndle    Comprehensive Metabolic Panel    sulfamethoxazole-trimethoprim 800-160mg (BACTRIM DS) 800-160 mg Tab    DISCONTINUED: sulfamethoxazole-trimethoprim 800-160mg (BACTRIM DS) 800-160 mg Tab      2. Osteoporosis, unspecified osteoporosis type, unspecified pathological fracture presence        3. " Class 1 obesity due to excess calories with serious comorbidity and body mass index (BMI) of 34.0 to 34.9 in adult        4. Tobacco use disorder        5. Hyperlipidemia, unspecified hyperlipidemia type        6. Essential hypertension          Type 2 diabetes mellitus with hyperglycemia, with long-term current use of insulin  Reviewed goals of therapy - to get the best control we can without hypoglycemia. Goal <7.5   - last a1c at goal    Check labs in Dec 2023  Dexcom reviewed:  Blood sugars are very variable. He is giving several doses of short acting insulin daily due to steroids but he is chasing high and low blood sugars.   He is insulin stacking likely due to high dose steroids. Encouraged to give fiasp 30 minutes prior to a meal. He is weaning steroids.   Very mismatched with basal and bolus, due to steroids.  Medication Changes   INPEN with Fiasp cartridges   Average <100 units of novolog daily   I:C 1:8   Change ISF 15   Max    With Increase Tresiba U200 60 units daily  Add Mounjaro 2.5 mg weekly which will hopefully decrease his insulin requirements     Reviewed patient's current insulin regimen. Clarified proper insulin dose and timing in relation to meals, etc. Insulin injection sites and proper rotation instructed.   - reviewed dose adjustments as well.   -Advised frequent self blood glucose monitoring. Patient encouraged to document glucose results and bring them to every clinic visit    -Hypoglycemia precautions discussed. Instructed on precautions before driving.    -Close adherence to lifestyle changes recommended.    -Periodic follow ups for eye evaluations, foot care suggested.  Not on statin due to allergy - consider PCSK9 in future    Osteoporosis   Based on FRAX score    Does want to try osteoporosis medications    Avoid falls    Stop smoking    Continue exercise   Continue vitamin D and calcium in diet    Class 1 obesity due to excess calories with serious comorbidity and body mass  index (BMI) of 34.0 to 34.9 in adult    BMI Readings from Last 1 Encounters:   10/26/23 30.65 kg/m²    complicated by HTN, HLD, diabetes   encourage pt to work on healthy diet, increase exercise as tolerated.  Unable to afford Mounjaro    Tobacco use disorder  Smokes 1.5 ppd and not interested in smoking cessation       Hyperlipidemia  Allergy to statin  Not interested in trying PCSK9    Essential hypertension  bp controlled  continue regimen  F/u with PCP, monitor    Agrees to look at AVS    Follow up in about 6 weeks (around 1/5/2024).

## 2023-11-24 NOTE — PATIENT INSTRUCTIONS
teroid reduction               He is currently on   He is taking prednisone 3AM 15 mg; 9AM 2.5 mg; 3PM takes 5 mg.      Will try to wean to   He is taking prednisone 3AM 15 mg; 9AM 2.5 mg; 3PM take 2.5 mg. If you do well on this dose for a couple of days, decrease 3AM to 12.5 mg at 3AM.     [email or web address]     Osteoporosis                           Based on FRAX score               Does want to try osteoporosis medications              Avoid falls               Stop smoking               Continue exercise              Continue vitamin D and calcium in diet     Cholesterol               LDL goal less than 70              Consider Praulent in the future if needed   Check LP in future       Diabetes  Check labs in Dec 2023  Dexcom reviewed:  Blood sugars are very variable. He is giving several doses of short acting insulin daily due to steroids but he is chasing high and low blood sugars.   He is insulin stacking likely due to high dose steroids. Encouraged to give fiasp 30 minutes prior to a meal. He is weaning steroids.   Very mismatched with basal and bolus, due to steroids.  Medication Changes   INPEN with Fiasp cartridges   Average <100 units of novolog daily   I:C 1:8   Change ISF 15   Max    With Increase Tresiba U200 60 units daily  Add Mounjaro 2.5 mg weekly which will hopefully decrease his insulin requirements     Recommended daily allowance of calcium is 1401-6426 mg daily, preferably from food. See below  Recommended daily allowance of vitamin D is 8173-8751 IU daily    Encouraged weight bearing exercise  Encouraged to avoid falls  Avoid alcohol and tobacco    Estimated Calcium Content of Foods:  Produce  Serving Size Estimated Calcium*    Rebeca greens, frozen 8 oz 360 mg   Broccoli gabbie 8 oz 200 mg   Kale, frozen 8 oz 180 mg   Soy Beans, green, boiled 8 oz 175 mg   Bok Nu, cooked, boiled 8 oz 160 mg   Figs, dried 2 figs 65 mg   Broccoli, fresh, cooked 8 oz 60 mg   Oranges 1 whole 55  mg   Seafood Serving Size Estimated Calcium*    Sardines, canned with bones 3 oz 325 mg   [place], canned with bones 3 oz 180 mg   Shrimp, canned 3 oz 125 mg   Dairy Serving Size Estimated Calcium*    Ricotta, part-skim 4 oz 335 mg   Yogurt, plain, low-fat 6 oz 310 mg   Milk, skim, low-fat, whole 8 oz 300 mg   Yogurt with fruit, low-fat 6 oz 260 mg   Mozzarella, part-skim 1 oz 210 mg   Cheddar 1 oz 205 mg   Yogurt, Greek 6 oz 200 mg   American Cheese 1 oz 195 mg   Feta Cheese 4 oz 140 mg   Cottage Cheese, 2% 4 oz 105 mg   Frozen yogurt, vanilla 8 oz 105 mg   Ice Cream, vanilla 8 oz 85 mg   Parmesan 1 tbsp 55 mg   Fortified Food Serving Size Estimated Calcium*   [place] milk, rice milk or soy milk, fortified 8 oz 300 mg   Orange juice and other fruit juices, fortified 8 oz 300 mg   Tofu, prepared with calcium 4 oz 205 mg   Waffle, frozen, fortified 2 pieces 200 mg   Oatmeal, fortified 1 packet 140 mg   English muffin, fortified 1 muffin 100 mg   Cereal, fortified 8 oz 100-1,000 mg   Other Serving Size Estimated Calcium*   Mac & cheese, frozen 1 package 325 mg   Pizza, cheese, frozen 1 serving 115 mg   Pudding, chocolate, prepared with 2% milk 4 oz 160 mg   Beans, baked, canned 4 oz 160 mg   *The calcium content listed for most foods is estimated and can vary due to multiple factors. Check the food label to determine how much calcium is in a particular product.  If you read the nutrition label for a food source, it lists the % calcium in that food.  For an 8 oz glass of milk, for example, the label states calcium 30%.  This is equivalent to 300 mg of calcium (multiply the listed number by 10).   **Table from the National [employer]opor[employer]      Osteoporosis medications  These are the medications we discussed for osteoporosis treatment:    - Bisphosphonates:         - these slow down bone loss         - oral forms (Fosamax and Actonel are examples) - taken once weekly or once monthly         - IV form (Reclast) -  "given intravenously once yearly    - Prolia (denosumab):          - slows down bone loss           - it is a subcutaneous injection (under the skin) every 6 months, given in the office    - Forteo (teriparatide) or Tymlos (abaloparatide):           - medications that "build bone" or increases bone formation           - subcutaneous injection (under the skin) taken once daily that you self-administer at home for 18-24 months    -Evenity (romosozumab)   -medications that "build bone" or increases bone formation   - subcutaneous injection (under the skin) taken once monthly for one year    For more information on medications: https://[email or web address].org/patients/treatment/medicationadherence/    "

## 2023-11-24 NOTE — ASSESSMENT & PLAN NOTE
Reviewed goals of therapy - to get the best control we can without hypoglycemia. Goal <7.5   - last a1c at goal    Check labs in Dec 2023  Dexcom reviewed:  Blood sugars are very variable. He is giving several doses of short acting insulin daily due to steroids but he is chasing high and low blood sugars.   He is insulin stacking likely due to high dose steroids. Encouraged to give fiasp 30 minutes prior to a meal. He is weaning steroids.   Very mismatched with basal and bolus, due to steroids.  Medication Changes   INPEN with Fiasp cartridges   Average <100 units of novolog daily   I:C 1:8   Change ISF 15   Max    With Increase Tresiba U200 60 units daily  Add Mounjaro 2.5 mg weekly which will hopefully decrease his insulin requirements     Reviewed patient's current insulin regimen. Clarified proper insulin dose and timing in relation to meals, etc. Insulin injection sites and proper rotation instructed.   - reviewed dose adjustments as well.   -Advised frequent self blood glucose monitoring. Patient encouraged to document glucose results and bring them to every clinic visit    -Hypoglycemia precautions discussed. Instructed on precautions before driving.    -Close adherence to lifestyle changes recommended.    -Periodic follow ups for eye evaluations, foot care suggested.  Not on statin due to allergy - consider PCSK9 in future

## 2023-12-01 ENCOUNTER — LAB VISIT (OUTPATIENT)
Dept: LAB | Facility: OTHER | Age: 68
End: 2023-12-01
Attending: INTERNAL MEDICINE
Payer: MEDICARE

## 2023-12-01 DIAGNOSIS — E78.00 HYPERCHOLESTEROLEMIA: ICD-10-CM

## 2023-12-01 DIAGNOSIS — E11.65 TYPE 2 DIABETES MELLITUS WITH HYPERGLYCEMIA, WITH LONG-TERM CURRENT USE OF INSULIN: ICD-10-CM

## 2023-12-01 DIAGNOSIS — Z79.4 TYPE 2 DIABETES MELLITUS WITH HYPERGLYCEMIA, WITH LONG-TERM CURRENT USE OF INSULIN: ICD-10-CM

## 2023-12-01 LAB
ALBUMIN SERPL BCP-MCNC: 3.6 G/DL (ref 3.5–5.2)
ALBUMIN SERPL BCP-MCNC: 3.6 G/DL (ref 3.5–5.2)
ALP SERPL-CCNC: 62 U/L (ref 55–135)
ALP SERPL-CCNC: 62 U/L (ref 55–135)
ALT SERPL W/O P-5'-P-CCNC: 49 U/L (ref 10–44)
ALT SERPL W/O P-5'-P-CCNC: 49 U/L (ref 10–44)
ANION GAP SERPL CALC-SCNC: 12 MMOL/L (ref 8–16)
ANION GAP SERPL CALC-SCNC: 12 MMOL/L (ref 8–16)
AST SERPL-CCNC: 28 U/L (ref 10–40)
AST SERPL-CCNC: 28 U/L (ref 10–40)
BILIRUB SERPL-MCNC: 0.4 MG/DL (ref 0.1–1)
BILIRUB SERPL-MCNC: 0.4 MG/DL (ref 0.1–1)
BUN SERPL-MCNC: 28 MG/DL (ref 8–23)
BUN SERPL-MCNC: 28 MG/DL (ref 8–23)
CALCIUM SERPL-MCNC: 9.3 MG/DL (ref 8.7–10.5)
CALCIUM SERPL-MCNC: 9.3 MG/DL (ref 8.7–10.5)
CHLORIDE SERPL-SCNC: 100 MMOL/L (ref 95–110)
CHLORIDE SERPL-SCNC: 100 MMOL/L (ref 95–110)
CHOLEST SERPL-MCNC: 221 MG/DL (ref 120–199)
CHOLEST/HDLC SERPL: 3.6 {RATIO} (ref 2–5)
CO2 SERPL-SCNC: 29 MMOL/L (ref 23–29)
CO2 SERPL-SCNC: 29 MMOL/L (ref 23–29)
CREAT SERPL-MCNC: 1.3 MG/DL (ref 0.5–1.4)
CREAT SERPL-MCNC: 1.3 MG/DL (ref 0.5–1.4)
EST. GFR  (NO RACE VARIABLE): 59 ML/MIN/1.73 M^2
EST. GFR  (NO RACE VARIABLE): 59 ML/MIN/1.73 M^2
ESTIMATED AVG GLUCOSE: 154 MG/DL (ref 68–131)
GLUCOSE SERPL-MCNC: 146 MG/DL (ref 70–110)
GLUCOSE SERPL-MCNC: 146 MG/DL (ref 70–110)
HBA1C MFR BLD: 7 % (ref 4–5.6)
HDLC SERPL-MCNC: 61 MG/DL (ref 40–75)
HDLC SERPL: 27.6 % (ref 20–50)
LDLC SERPL CALC-MCNC: 128.6 MG/DL (ref 63–159)
MAGNESIUM SERPL-MCNC: 2.3 MG/DL (ref 1.6–2.6)
NONHDLC SERPL-MCNC: 160 MG/DL
POTASSIUM SERPL-SCNC: 3.8 MMOL/L (ref 3.5–5.1)
POTASSIUM SERPL-SCNC: 3.8 MMOL/L (ref 3.5–5.1)
PROT SERPL-MCNC: 7.1 G/DL (ref 6–8.4)
PROT SERPL-MCNC: 7.1 G/DL (ref 6–8.4)
SODIUM SERPL-SCNC: 141 MMOL/L (ref 136–145)
SODIUM SERPL-SCNC: 141 MMOL/L (ref 136–145)
TRIGL SERPL-MCNC: 157 MG/DL (ref 30–150)

## 2023-12-01 PROCEDURE — 83036 HEMOGLOBIN GLYCOSYLATED A1C: CPT | Performed by: NURSE PRACTITIONER

## 2023-12-01 PROCEDURE — 83735 ASSAY OF MAGNESIUM: CPT | Performed by: INTERNAL MEDICINE

## 2023-12-01 PROCEDURE — 36415 COLL VENOUS BLD VENIPUNCTURE: CPT | Performed by: INTERNAL MEDICINE

## 2023-12-01 PROCEDURE — 80061 LIPID PANEL: CPT | Performed by: INTERNAL MEDICINE

## 2023-12-01 PROCEDURE — 80053 COMPREHEN METABOLIC PANEL: CPT | Performed by: INTERNAL MEDICINE

## 2023-12-05 ENCOUNTER — OFFICE VISIT (OUTPATIENT)
Dept: CARDIOLOGY | Facility: CLINIC | Age: 68
End: 2023-12-05
Payer: MEDICARE

## 2023-12-05 VITALS
BODY MASS INDEX: 31.87 KG/M2 | HEART RATE: 81 BPM | WEIGHT: 235 LBS | DIASTOLIC BLOOD PRESSURE: 68 MMHG | OXYGEN SATURATION: 95 % | SYSTOLIC BLOOD PRESSURE: 116 MMHG

## 2023-12-05 DIAGNOSIS — I25.10 ATHEROSCLEROSIS OF NATIVE CORONARY ARTERY OF NATIVE HEART WITHOUT ANGINA PECTORIS: ICD-10-CM

## 2023-12-05 DIAGNOSIS — I50.33 ACUTE ON CHRONIC DIASTOLIC HEART FAILURE: Primary | ICD-10-CM

## 2023-12-05 DIAGNOSIS — I47.10 SUPRAVENTRICULAR TACHYCARDIA: ICD-10-CM

## 2023-12-05 DIAGNOSIS — I10 PRIMARY HYPERTENSION: ICD-10-CM

## 2023-12-05 DIAGNOSIS — E78.00 HYPERCHOLESTEROLEMIA: ICD-10-CM

## 2023-12-05 PROCEDURE — 1126F AMNT PAIN NOTED NONE PRSNT: CPT | Mod: CPTII,S$GLB,, | Performed by: INTERNAL MEDICINE

## 2023-12-05 PROCEDURE — 4010F ACE/ARB THERAPY RXD/TAKEN: CPT | Mod: CPTII,S$GLB,, | Performed by: INTERNAL MEDICINE

## 2023-12-05 PROCEDURE — 99999 PR PBB SHADOW E&M-EST. PATIENT-LVL III: ICD-10-PCS | Mod: PBBFAC,,, | Performed by: INTERNAL MEDICINE

## 2023-12-05 PROCEDURE — 3008F BODY MASS INDEX DOCD: CPT | Mod: CPTII,S$GLB,, | Performed by: INTERNAL MEDICINE

## 2023-12-05 PROCEDURE — 3061F NEG MICROALBUMINURIA REV: CPT | Mod: CPTII,S$GLB,, | Performed by: INTERNAL MEDICINE

## 2023-12-05 PROCEDURE — 3051F PR MOST RECENT HEMOGLOBIN A1C LEVEL 7.0 - < 8.0%: ICD-10-PCS | Mod: CPTII,S$GLB,, | Performed by: INTERNAL MEDICINE

## 2023-12-05 PROCEDURE — 1101F PR PT FALLS ASSESS DOC 0-1 FALLS W/OUT INJ PAST YR: ICD-10-PCS | Mod: CPTII,S$GLB,, | Performed by: INTERNAL MEDICINE

## 2023-12-05 PROCEDURE — 3008F PR BODY MASS INDEX (BMI) DOCUMENTED: ICD-10-PCS | Mod: CPTII,S$GLB,, | Performed by: INTERNAL MEDICINE

## 2023-12-05 PROCEDURE — 1159F MED LIST DOCD IN RCRD: CPT | Mod: CPTII,S$GLB,, | Performed by: INTERNAL MEDICINE

## 2023-12-05 PROCEDURE — 3288F PR FALLS RISK ASSESSMENT DOCUMENTED: ICD-10-PCS | Mod: CPTII,S$GLB,, | Performed by: INTERNAL MEDICINE

## 2023-12-05 PROCEDURE — 1101F PT FALLS ASSESS-DOCD LE1/YR: CPT | Mod: CPTII,S$GLB,, | Performed by: INTERNAL MEDICINE

## 2023-12-05 PROCEDURE — 4010F PR ACE/ARB THEARPY RXD/TAKEN: ICD-10-PCS | Mod: CPTII,S$GLB,, | Performed by: INTERNAL MEDICINE

## 2023-12-05 PROCEDURE — 1159F PR MEDICATION LIST DOCUMENTED IN MEDICAL RECORD: ICD-10-PCS | Mod: CPTII,S$GLB,, | Performed by: INTERNAL MEDICINE

## 2023-12-05 PROCEDURE — 99215 OFFICE O/P EST HI 40 MIN: CPT | Mod: S$GLB,,, | Performed by: INTERNAL MEDICINE

## 2023-12-05 PROCEDURE — 3051F HG A1C>EQUAL 7.0%<8.0%: CPT | Mod: CPTII,S$GLB,, | Performed by: INTERNAL MEDICINE

## 2023-12-05 PROCEDURE — 99215 PR OFFICE/OUTPT VISIT, EST, LEVL V, 40-54 MIN: ICD-10-PCS | Mod: S$GLB,,, | Performed by: INTERNAL MEDICINE

## 2023-12-05 PROCEDURE — 3288F FALL RISK ASSESSMENT DOCD: CPT | Mod: CPTII,S$GLB,, | Performed by: INTERNAL MEDICINE

## 2023-12-05 PROCEDURE — 3074F PR MOST RECENT SYSTOLIC BLOOD PRESSURE < 130 MM HG: ICD-10-PCS | Mod: CPTII,S$GLB,, | Performed by: INTERNAL MEDICINE

## 2023-12-05 PROCEDURE — 3061F PR NEG MICROALBUMINURIA RESULT DOCUMENTED/REVIEW: ICD-10-PCS | Mod: CPTII,S$GLB,, | Performed by: INTERNAL MEDICINE

## 2023-12-05 PROCEDURE — 1160F RVW MEDS BY RX/DR IN RCRD: CPT | Mod: CPTII,S$GLB,, | Performed by: INTERNAL MEDICINE

## 2023-12-05 PROCEDURE — 3066F NEPHROPATHY DOC TX: CPT | Mod: CPTII,S$GLB,, | Performed by: INTERNAL MEDICINE

## 2023-12-05 PROCEDURE — 1126F PR PAIN SEVERITY QUANTIFIED, NO PAIN PRESENT: ICD-10-PCS | Mod: CPTII,S$GLB,, | Performed by: INTERNAL MEDICINE

## 2023-12-05 PROCEDURE — 3078F PR MOST RECENT DIASTOLIC BLOOD PRESSURE < 80 MM HG: ICD-10-PCS | Mod: CPTII,S$GLB,, | Performed by: INTERNAL MEDICINE

## 2023-12-05 PROCEDURE — 3066F PR DOCUMENTATION OF TREATMENT FOR NEPHROPATHY: ICD-10-PCS | Mod: CPTII,S$GLB,, | Performed by: INTERNAL MEDICINE

## 2023-12-05 PROCEDURE — 3078F DIAST BP <80 MM HG: CPT | Mod: CPTII,S$GLB,, | Performed by: INTERNAL MEDICINE

## 2023-12-05 PROCEDURE — 3074F SYST BP LT 130 MM HG: CPT | Mod: CPTII,S$GLB,, | Performed by: INTERNAL MEDICINE

## 2023-12-05 PROCEDURE — 99999 PR PBB SHADOW E&M-EST. PATIENT-LVL III: CPT | Mod: PBBFAC,,, | Performed by: INTERNAL MEDICINE

## 2023-12-05 PROCEDURE — 1160F PR REVIEW ALL MEDS BY PRESCRIBER/CLIN PHARMACIST DOCUMENTED: ICD-10-PCS | Mod: CPTII,S$GLB,, | Performed by: INTERNAL MEDICINE

## 2023-12-05 NOTE — PROGRESS NOTES
"[facility]    Chief Complaint  Chief Complaint   Patient presents with    Congestive Heart Failure       HPI:    Seems have diuresed well.  Has been taking Lasix 40 mg twice daily.  Occasionally adds 20 mg in the middle of the day.  Still having issues with his diabetes control and prednisone usage.    Medications  Current Outpatient Medications   Medication Sig Dispense Refill    aspirin 81 MG Chew Take 81 mg by mouth.      BD ULTRA-FINE RAHUL PEN NEEDLE 32 gauge x 5/32" Ndle To use 6 daily 400 each 3    carvediloL (COREG) 6.25 MG tablet Take 1 tablet (6.25 mg total) by mouth 2 (two) times daily with meals. 180 tablet 3    digoxin (LANOXIN) 125 mcg tablet TAKE 1 TABLET BY MOUTH ONCE DAILY. 90 tablet 3    diltiaZEM (CARDIZEM CD) 120 MG Cp24 Take 120 mg by mouth Daily.      ergocalciferol (ERGOCALCIFEROL) 50,000 unit Cap Take 1 capsule (50,000 Units total) by mouth every 7 days. 12 capsule 3    folic acid (FOLVITE) 1 MG tablet Take 1 tablet (1 mg total) by mouth once daily. 30 tablet 5    furosemide (LASIX) 40 MG tablet Take 1 tablet (40 mg total) by mouth daily as needed (fluid). (Patient taking differently: Take 40 mg by mouth daily as needed (fluid). 80mg daily) 90 tablet 3    insulin aspart, niacinamide, (FIASP PENFILL U-100 INSULIN) 100 unit/mL (3 mL) Crtg pen To use with inPen; 140 max daily dose. 14 pen 11    insulin degludec (TRESIBA FLEXTOUCH U-200) 200 unit/mL (3 mL) insulin pen Inject 60 Units into the skin once daily. Cartridge for novoecho 3 pen 11    lamiVUDine (EPIVIR) 150 MG Tab TAKE 1 TABLET BY MOUTH EVERY DAY 30 tablet 23    multivitamin (THERAGRAN) per tablet Take 1 tablet by mouth once daily.      mupirocin (BACTROBAN) 2 % ointment Apply to affected area 3 times daily 22 g 1    nitroGLYCERIN (NITROSTAT) 0.4 MG SL tablet Place 1 tablet (0.4 mg total) under the tongue every 5 (five) minutes as needed for Chest pain. 25 tablet 11    nystatin (MYCOSTATIN) powder Apply topically 4 (four) " times daily. Apply to intertriginous areas as directed up to four times daily to reduce moisture. for 14 days 30 g 0    olmesartan (BENICAR) 40 MG tablet TAKE 1 TABLET BY MOUTH EVERY DAY AT NIGHT 90 tablet 3    PROAIR HFA 90 mcg/actuation inhaler       sulfamethoxazole-trimethoprim 800-160mg (BACTRIM DS) 800-160 mg Tab Take 1 tablet by mouth 2 (two) times daily. 14 tablet 0    tirzepatide (MOUNJARO) 2.5 mg/0.5 mL PnIj Inject 2.5 mg into the skin every 7 days. 4 pen 11     No current facility-administered medications for this visit.        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/26/2023    forehead    Stroke     TIA     Past Surgical History:   Procedure Laterality Date    CORONARY ANGIOGRAPHY N/A 3/10/2021    Procedure: ANGIOGRAM, CORONARY ARTERY - right radial;  Surgeon: Shemar Dempsey MD;  Location: [facility] CATH LAB;  Service: Cardiology;  Laterality: N/A;    CORONARY STENT PLACEMENT  03/10/2021    prox-mid RCA [place] 4.5 x 26 mm, 4.5 x 12 mm    INCISION AND DRAINAGE OF SCROTUM N/A 11/15/2022    Procedure: INCISION AND DRAINAGE, SCROTUM;  Surgeon: William Hatch MD;  Location: [facility] OR;  Service: Urology;  Laterality: N/A;    INCISION AND DRAINAGE OF SCROTUM N/A 11/17/2022    Procedure: INCISION AND DRAINAGE, SCROTUM;  Surgeon: William Hatch MD;  Location: [facility] OR;  Service: Urology;  Laterality: N/A;    LUNG LOBECTOMY Right 2008    RUL lobectomy after removal of atrial myxoma    PLEURA BIOPSY      RESECTION OF ATRIAL MYXOMA  2007     Social History     Socioeconomic History    Marital status: Single   Occupational History    Occupation: , respiratory  therapist, [employer]     Comment: Retired   Tobacco Use    Smoking status: Every Day     Current packs/day: 1.00     Average packs/day: 1 pack/day for 35.0 years (35.0 ttl pk-yrs)     Types: Cigarettes    Smokeless tobacco: Never   Substance and Sexual Activity    Alcohol use: Not Currently    Drug use: No    Sexual activity: Never     Social Determinants of Health     Financial Resource Strain: Medium Risk (11/24/2023)    Overall Financial Resource Strain (CARDIA)     Difficulty of Paying Living Expenses: Somewhat hard   Food Insecurity: Food Insecurity Present (11/24/2023)    Hunger Vital Sign     Worried About Running Out of Food in the Last Year: Sometimes true     Ran Out of Food in the Last Year: Patient refused   Transportation Needs: Unmet Transportation Needs (11/24/2023)    PRAPARE - Transportation     Lack of Transportation (Medical): Yes     Lack of Transportation (Non-Medical): Yes   Physical Activity: Insufficiently Active (11/24/2023)    Exercise Vital Sign     Days of Exercise per Week: 1 day     Minutes of Exercise per Session: 10 min   Stress: Stress Concern Present (11/24/2023)    [employer] - Occupational Stress Questionnaire     Feeling of Stress : To some extent   Social Connections: Unknown (11/24/2023)    Social Connection and Isolation Panel [NHANES]     Frequency of Communication with Friends and Family: More than three times a week     Frequency of Social Gatherings with Friends and Family: Once a week     Active Member of Clubs or Organizations: Yes     Attends Club or Organization Meetings: More than 4 times per year     Marital Status:    Housing Stability: High Risk (11/24/2023)    Housing Stability Vital Sign     Unable to Pay for Housing in the Last Year: Yes     Number of Places Lived in the Last Year: 1     Unstable Housing in the Last Year: No     Family History   Problem Relation Age of Onset    Hodgkin's lymphoma Mother     Diabetes  type II Mother     Kidney failure Father     Diabetes type I Father     Cancer Sister         Allergies  Review of patient's allergies indicates:   Allergen Reactions    Enbrel [etanercept] Shortness Of Breath     CHF    Nsaids (non-steroidal anti-inflammatory drug) Other (See Comments)     hypertention  Other reaction(s): Other (See Comments)  hypertention  HTN    Statins-hmg-coa reductase inhibitors Other (See Comments)     Heart arrythemias  Other reaction(s): Other (See Comments)  Heart arrythemias  Joint pain and cardiac arrythmias    Pcn [penicillins] Rash       Review of Systems   Review of Systems   Constitutional: Negative for diaphoresis, malaise/fatigue, weight gain and weight loss.   Eyes:  Negative for visual disturbance.   Cardiovascular:  Positive for leg swelling. Negative for chest pain, claudication, cyanosis, dyspnea on exertion, irregular heartbeat, near-syncope, orthopnea, palpitations, paroxysmal nocturnal dyspnea and syncope.   Respiratory:  Negative for hemoptysis, shortness of breath, sleep disturbances due to breathing and wheezing.    Hematologic/Lymphatic: Negative for bleeding problem. Does not bruise/bleed easily.   Skin:  Negative for poor wound healing.   Musculoskeletal:  Negative for muscle cramps and myalgias.   Gastrointestinal:  Negative for abdominal pain, anorexia, diarrhea, heartburn, hematemesis, hematochezia, melena, nausea and vomiting.   Genitourinary:  Negative for hematuria and nocturia.   Neurological:  Negative for excessive daytime sleepiness, dizziness, focal weakness, light-headedness and weakness.       Physical Exam  Vitals:    12/05/23 1425   BP: 116/68   Pulse: 81     Wt Readings from Last 1 Encounters:   12/05/23 106.6 kg (235 lb)     Physical Exam  Constitutional:       General: He is not in acute distress.     Appearance: He is obese. He is diaphoretic. He is not toxic-appearing.   HENT:      Head: Normocephalic and atraumatic.   Eyes:      General: No  scleral icterus.     Conjunctiva/sclera: Conjunctivae normal.   Neck:      Thyroid: No thyromegaly.      Vascular: No carotid bruit, hepatojugular reflux or JVD.   Cardiovascular:      Rate and Rhythm: Normal rate and regular rhythm. No extrasystoles are present.     Chest Wall: PMI is not displaced.      Pulses:           Carotid pulses are 2+ on the right side and 2+ on the left side.       Radial pulses are 2+ on the right side and 2+ on the left side.        Dorsalis pedis pulses are 2+ on the right side and 2+ on the left side.        Posterior tibial pulses are 2+ on the right side and 2+ on the left side.      Heart sounds: S1 normal and S2 normal. No murmur heard.     Gallop present. S4 sounds present. No S3 sounds.   Pulmonary:      Effort: No accessory muscle usage or respiratory distress.      Breath sounds: No decreased breath sounds, wheezing, rhonchi or rales.   Abdominal:      General: Bowel sounds are normal. There is no abdominal bruit.      Palpations: Abdomen is soft. There is no hepatomegaly, splenomegaly or pulsatile mass.      Tenderness: There is no abdominal tenderness.   Musculoskeletal:         General: No tenderness or deformity.      Right lower leg: No edema.      Left lower leg: No edema.   Skin:     General: Skin is warm.      Coloration: Skin is not pale.      Nails: There is no clubbing.   Neurological:      General: No focal deficit present.      Mental Status: He is alert and oriented to person, place, and time.      Sensory: Sensation is intact.      Motor: Motor function is intact.   Psychiatric:         Speech: Speech normal.         Behavior: Behavior normal. Behavior is cooperative.         Labs  Lab Visit on 12/01/2023   Component Date Value Ref Range Status    Cholesterol 12/01/2023 221 (H)  120 - 199 mg/dL Final    Comment: The National Cholesterol Education Program (NCEP) has set the  following guidelines (reference ranges) for  Cholesterol:  Optimal.....................<200 mg/dL  Borderline High.............200-239 mg/dL  High........................> or = 240 mg/dL      Triglycerides 12/01/2023 157 (H)  30 - 150 mg/dL Final    Comment: The National Cholesterol Education Program (NCEP) has set the  following guidelines (reference values) for triglycerides:  Normal......................<150 mg/dL  Borderline High.............150-199 mg/dL  High........................200-499 mg/dL      HDL 12/01/2023 61  40 - 75 mg/dL Final    Comment: The National Cholesterol Education Program (NCEP) has set the  following guidelines (reference values) for HDL Cholesterol:  Low...............<40 mg/dL  Optimal...........>60 mg/dL      LDL Cholesterol 12/01/2023 128.6  63.0 - 159.0 mg/dL Final    Comment: The National Cholesterol Education Program (NCEP) has set the  following guidelines (reference values) for LDL Cholesterol:  Optimal.......................<130 mg/dL  Borderline High...............130-159 mg/dL  High..........................160-189 mg/dL  Very High.....................>190 mg/dL      HDL/Cholesterol Ratio 12/01/2023 27.6  20.0 - 50.0 % Final    Total Cholesterol/HDL Ratio 12/01/2023 3.6  2.0 - 5.0 Final    Non-HDL Cholesterol 12/01/2023 160  mg/dL Final    Comment: Risk category and Non-HDL cholesterol goals:  Coronary heart disease (CHD)or equivalent (10-year risk of CHD >20%):  Non-HDL cholesterol goal     <130 mg/dL  Two or more CHD risk factors and 10-year risk of CHD <= 20%:  Non-HDL cholesterol goal     <160 mg/dL  0 to 1 CHD risk factor:  Non-HDL cholesterol goal     <190 mg/dL      Sodium 12/01/2023 141  136 - 145 mmol/L Final    Potassium 12/01/2023 3.8  3.5 - 5.1 mmol/L Final    Specimen slightly hemolyzed    Chloride 12/01/2023 100  95 - 110 mmol/L Final    CO2 12/01/2023 29  23 - 29 mmol/L Final    Glucose 12/01/2023 146 (H)  70 - 110 mg/dL Final    BUN 12/01/2023 28 (H)  8 - 23 mg/dL Final    Creatinine 12/01/2023 1.3  0.5 -  1.4 mg/dL Final    Calcium 12/01/2023 9.3  8.7 - 10.5 mg/dL Final    Total Protein 12/01/2023 7.1  6.0 - 8.4 g/dL Final    Albumin 12/01/2023 3.6  3.5 - 5.2 g/dL Final    Total Bilirubin 12/01/2023 0.4  0.1 - 1.0 mg/dL Final    Comment: For infants and newborns, interpretation of results should be based  on gestational age, weight and in agreement with clinical  observations.    Premature Infant recommended reference ranges:  Up to 24 hours.............<8.0 mg/dL  Up to 48 hours............<12.0 mg/dL  3-5 days..................<15.0 mg/dL  6-29 days.................<15.0 mg/dL      Alkaline Phosphatase 12/01/2023 62  55 - 135 U/L Final    AST 12/01/2023 28  10 - 40 U/L Final    ALT 12/01/2023 49 (H)  10 - 44 U/L Final    eGFR 12/01/2023 59 (A)  >60 mL/min/1.73 m^2 Final    Anion Gap 12/01/2023 12  8 - 16 mmol/L Final    Magnesium 12/01/2023 2.3  1.6 - 2.6 mg/dL Final    Hemoglobin A1C 12/01/2023 7.0 (H)  4.0 - 5.6 % Final    Comment: ADA Screening Guidelines:  5.7-6.4%  Consistent with prediabetes  >or=6.5%  Consistent with diabetes    High levels of fetal hemoglobin interfere with the HbA1C  assay. Heterozygous hemoglobin variants (HbS, HgC, etc)do  not significantly interfere with this assay.   However, presence of multiple variants may affect accuracy.      Estimated Avg Glucose 12/01/2023 154 (H)  68 - 131 mg/dL Final    Sodium 12/01/2023 141  136 - 145 mmol/L Final    Potassium 12/01/2023 3.8  3.5 - 5.1 mmol/L Final    Specimen slightly hemolyzed    Chloride 12/01/2023 100  95 - 110 mmol/L Final    CO2 12/01/2023 29  23 - 29 mmol/L Final    Glucose 12/01/2023 146 (H)  70 - 110 mg/dL Final    BUN 12/01/2023 28 (H)  8 - 23 mg/dL Final    Creatinine 12/01/2023 1.3  0.5 - 1.4 mg/dL Final    Calcium 12/01/2023 9.3  8.7 - 10.5 mg/dL Final    Total Protein 12/01/2023 7.1  6.0 - 8.4 g/dL Final    Albumin 12/01/2023 3.6  3.5 - 5.2 g/dL Final    Total Bilirubin 12/01/2023 0.4  0.1 - 1.0 mg/dL Final    Comment: For  infants and newborns, interpretation of results should be based  on gestational age, weight and in agreement with clinical  observations.    Premature Infant recommended reference ranges:  Up to 24 hours.............<8.0 mg/dL  Up to 48 hours............<12.0 mg/dL  3-5 days..................<15.0 mg/dL  6-29 days.................<15.0 mg/dL      Alkaline Phosphatase 12/01/2023 62  55 - 135 U/L Final    AST 12/01/2023 28  10 - 40 U/L Final    ALT 12/01/2023 49 (H)  10 - 44 U/L Final    eGFR 12/01/2023 59 (A)  >60 mL/min/1.73 m^2 Final    Anion Gap 12/01/2023 12  8 - 16 mmol/L Final   Office Visit on 10/03/2023   Component Date Value Ref Range Status    Final Pathologic Diagnosis 10/03/2023    Final                    Value:[facility] DIAGNOSIS:    SKIN, FOREHEAD, SHAVE BIOPSY:  - Invasive squamous cell carcinoma, moderately differentiated, transected.    YENI MARIO M.D.        Report attached.    Performing site:  [address] [address]    &quot;Disclaimer:  This case diagnosis was rendered completely by the outside consultation pathologist and the case is electronically signed by an [facility] pathologist listed below solely to release the report into the medical record.&quot;      Interp By Rober Valdez MD, PhD, Signed on 10/10/2023 at 13:42    Disclaimer 10/03/2023 Unless the case is a 'gross only' or additional testing only, the final diagnosis for each specimen is based on a microscopic examination of appropriate tissue sections.   Final   Lab Visit on 09/29/2023   Component Date Value Ref Range Status    Sodium 09/29/2023 139  136 - 145 mmol/L Final    Potassium 09/29/2023 4.0  3.5 - 5.1 mmol/L Final    Chloride 09/29/2023 102  95 - 110 mmol/L Final    CO2 09/29/2023 25  23 - 29 mmol/L Final    Glucose 09/29/2023 98  70 - 110 mg/dL Final    BUN 09/29/2023 19  8 - 23 mg/dL Final    Creatinine 09/29/2023 1.0  0.5 - 1.4 mg/dL Final    Calcium 09/29/2023 9.3  8.7 - 10.5 mg/dL  Final    Total Protein 09/29/2023 7.3  6.0 - 8.4 g/dL Final    Albumin 09/29/2023 3.8  3.5 - 5.2 g/dL Final    Total Bilirubin 09/29/2023 0.5  0.1 - 1.0 mg/dL Final    Comment: For infants and newborns, interpretation of results should be based  on gestational age, weight and in agreement with clinical  observations.    Premature Infant recommended reference ranges:  Up to 24 hours.............<8.0 mg/dL  Up to 48 hours............<12.0 mg/dL  3-5 days..................<15.0 mg/dL  6-29 days.................<15.0 mg/dL      Alkaline Phosphatase 09/29/2023 76  55 - 135 U/L Final    AST 09/29/2023 39  10 - 40 U/L Final    ALT 09/29/2023 89 (H)  10 - 44 U/L Final    eGFR 09/29/2023 >60  >60 mL/min/1.73 m^2 Final    Anion Gap 09/29/2023 12  8 - 16 mmol/L Final    Hemoglobin A1C 09/29/2023 7.1 (H)  4.0 - 5.6 % Final    Comment: ADA Screening Guidelines:  5.7-6.4%  Consistent with prediabetes  >or=6.5%  Consistent with diabetes    High levels of fetal hemoglobin interfere with the HbA1C  assay. Heterozygous hemoglobin variants (HbS, HgC, etc)do  not significantly interfere with this assay.   However, presence of multiple variants may affect accuracy.      Estimated Avg Glucose 09/29/2023 157 (H)  68 - 131 mg/dL Final    Vit D, 25-Hydroxy 09/29/2023 30  30 - 96 ng/mL Final    Comment: Vitamin D deficiency.........<10 ng/mL                              Vitamin D insufficiency......10-29 ng/mL       Vitamin D sufficiency........> or equal to 30 ng/mL  Vitamin D toxicity............>100 ng/mL      PSA, Screen 09/29/2023 0.23  0.00 - 4.00 ng/mL Final    Comment: The testing method is a chemiluminescent microparticle immunoassay   manufactured by [employer] and performed on the [employer]   or   [employer] system. Values obtained with different assay manufacturers   for   methods may be different and cannot be used interchangeably.  PSA Expected levels:  Hormonal Therapy: <0.05 ng/ml  Prostatectomy: <0.01  ng/ml  Radiation Therapy: <1.00 ng/ml      Iron 09/29/2023 149  45 - 160 ug/dL Final    Transferrin 09/29/2023 263  200 - 375 mg/dL Final    TIBC 09/29/2023 389  250 - 450 ug/dL Final    Saturated Iron 09/29/2023 38  20 - 50 % Final   Admission on 09/21/2023, Discharged on 09/21/2023   Component Date Value Ref Range Status    POCT Glucose 09/21/2023 187 (H)  70 - 110 mg/dL Final    WBC 09/21/2023 8.06  3.90 - 12.70 K/uL Final    RBC 09/21/2023 5.16  4.60 - 6.20 M/uL Final    Hemoglobin 09/21/2023 16.8  14.0 - 18.0 g/dL Final    Hematocrit 09/21/2023 50.2  40.0 - 54.0 % Final    MCV 09/21/2023 97  82 - 98 fL Final    MCH 09/21/2023 32.6 (H)  27.0 - 31.0 pg Final    MCHC 09/21/2023 33.5  32.0 - 36.0 g/dL Final    RDW 09/21/2023 13.3  11.5 - 14.5 % Final    Platelets 09/21/2023 176  150 - 450 K/uL Final    MPV 09/21/2023 10.9  9.2 - 12.9 fL Final    Immature Granulocytes 09/21/2023 1.4 (H)  0.0 - 0.5 % Final    Gran # (ANC) 09/21/2023 5.7  1.8 - 7.7 K/uL Final    Immature Grans (Abs) 09/21/2023 0.11 (H)  0.00 - 0.04 K/uL Final    Comment: Mild elevation in immature granulocytes is non specific and   can be seen in a variety of conditions including stress response,   acute inflammation, trauma and pregnancy. Correlation with other   laboratory and clinical findings is essential.      Lymph # 09/21/2023 1.5  1.0 - 4.8 K/uL Final    Mono # 09/21/2023 0.6  0.3 - 1.0 K/uL Final    Eos # 09/21/2023 0.1  0.0 - 0.5 K/uL Final    Baso # 09/21/2023 0.03  0.00 - 0.20 K/uL Final    nRBC 09/21/2023 0  0 /100 WBC Final    Gran % 09/21/2023 71.2  38.0 - 73.0 % Final    Lymph % 09/21/2023 18.5  18.0 - 48.0 % Final    Mono % 09/21/2023 7.4  4.0 - 15.0 % Final    Eosinophil % 09/21/2023 1.1  0.0 - 8.0 % Final    Basophil % 09/21/2023 0.4  0.0 - 1.9 % Final    Differential Method 09/21/2023 Automated   Final    Sodium 09/21/2023 137  136 - 145 mmol/L Final    Potassium 09/21/2023 4.0  3.5 - 5.1 mmol/L Final    Specimen slightly hemolyzed     Chloride 09/21/2023 102  95 - 110 mmol/L Final    CO2 09/21/2023 25  23 - 29 mmol/L Final    Glucose 09/21/2023 204 (H)  70 - 110 mg/dL Final    BUN 09/21/2023 28 (H)  8 - 23 mg/dL Final    Creatinine 09/21/2023 1.1  0.5 - 1.4 mg/dL Final    Calcium 09/21/2023 9.6  8.7 - 10.5 mg/dL Final    Total Protein 09/21/2023 6.6  6.0 - 8.4 g/dL Final    Albumin 09/21/2023 3.6  3.5 - 5.2 g/dL Final    Total Bilirubin 09/21/2023 0.4  0.1 - 1.0 mg/dL Final    Comment: For infants and newborns, interpretation of results should be based  on gestational age, weight and in agreement with clinical  observations.    Premature Infant recommended reference ranges:  Up to 24 hours.............<8.0 mg/dL  Up to 48 hours............<12.0 mg/dL  3-5 days..................<15.0 mg/dL  6-29 days.................<15.0 mg/dL      Alkaline Phosphatase 09/21/2023 75  55 - 135 U/L Final    AST 09/21/2023 25  10 - 40 U/L Final    ALT 09/21/2023 51 (H)  10 - 44 U/L Final    eGFR 09/21/2023 >60  >60 mL/min/1.73 m^2 Final    Anion Gap 09/21/2023 10  8 - 16 mmol/L Final    POC Rapid COVID 09/21/2023 Negative  Negative Final     Acceptable 09/21/2023 Yes   Final   Lab Visit on 07/19/2023   Component Date Value Ref Range Status    Prolactin 07/19/2023 19.0  3.5 - 19.4 ng/mL Final    Sodium 07/19/2023 140  136 - 145 mmol/L Final    Potassium 07/19/2023 4.0  3.5 - 5.1 mmol/L Final    Chloride 07/19/2023 105  95 - 110 mmol/L Final    CO2 07/19/2023 27  23 - 29 mmol/L Final    Glucose 07/19/2023 144 (H)  70 - 110 mg/dL Final    BUN 07/19/2023 15  8 - 23 mg/dL Final    Creatinine 07/19/2023 0.9  0.5 - 1.4 mg/dL Final    Calcium 07/19/2023 9.0  8.7 - 10.5 mg/dL Final    Total Protein 07/19/2023 6.5  6.0 - 8.4 g/dL Final    Albumin 07/19/2023 3.4 (L)  3.5 - 5.2 g/dL Final    Total Bilirubin 07/19/2023 0.5  0.1 - 1.0 mg/dL Final    Comment: For infants and newborns, interpretation of results should be based  on gestational age, weight and in  agreement with clinical  observations.    Premature Infant recommended reference ranges:  Up to 24 hours.............<8.0 mg/dL  Up to 48 hours............<12.0 mg/dL  3-5 days..................<15.0 mg/dL  6-29 days.................<15.0 mg/dL      Alkaline Phosphatase 07/19/2023 85  55 - 135 U/L Final    AST 07/19/2023 19  10 - 40 U/L Final    ALT 07/19/2023 31  10 - 44 U/L Final    eGFR 07/19/2023 >60  >60 mL/min/1.73 m^2 Final    Anion Gap 07/19/2023 8  8 - 16 mmol/L Final    TSH 07/19/2023 2.405  0.400 - 4.000 uIU/mL Final    WBC 07/19/2023 7.82  3.90 - 12.70 K/uL Final    RBC 07/19/2023 5.09  4.60 - 6.20 M/uL Final    Hemoglobin 07/19/2023 16.1  14.0 - 18.0 g/dL Final    Hematocrit 07/19/2023 49.7  40.0 - 54.0 % Final    MCV 07/19/2023 98  82 - 98 fL Final    MCH 07/19/2023 31.6 (H)  27.0 - 31.0 pg Final    MCHC 07/19/2023 32.4  32.0 - 36.0 g/dL Final    RDW 07/19/2023 13.2  11.5 - 14.5 % Final    Platelets 07/19/2023 191  150 - 450 K/uL Final    MPV 07/19/2023 11.3  9.2 - 12.9 fL Final    Immature Granulocytes 07/19/2023 0.6 (H)  0.0 - 0.5 % Final    Gran # (ANC) 07/19/2023 4.1  1.8 - 7.7 K/uL Final    Immature Grans (Abs) 07/19/2023 0.05 (H)  0.00 - 0.04 K/uL Final    Comment: Mild elevation in immature granulocytes is non specific and   can be seen in a variety of conditions including stress response,   acute inflammation, trauma and pregnancy. Correlation with other   laboratory and clinical findings is essential.      Lymph # 07/19/2023 2.8  1.0 - 4.8 K/uL Final    Mono # 07/19/2023 0.5  0.3 - 1.0 K/uL Final    Eos # 07/19/2023 0.4  0.0 - 0.5 K/uL Final    Baso # 07/19/2023 0.06  0.00 - 0.20 K/uL Final    nRBC 07/19/2023 0  0 /100 WBC Final    Gran % 07/19/2023 52.0  38.0 - 73.0 % Final    Lymph % 07/19/2023 35.3  18.0 - 48.0 % Final    Mono % 07/19/2023 6.8  4.0 - 15.0 % Final    Eosinophil % 07/19/2023 4.5  0.0 - 8.0 % Final    Basophil % 07/19/2023 0.8  0.0 - 1.9 % Final    Differential Method  07/19/2023 Automated   Final    Hemoglobin A1C 07/19/2023 7.0 (H)  4.0 - 5.6 % Final    Comment: ADA Screening Guidelines:  5.7-6.4%  Consistent with prediabetes  >or=6.5%  Consistent with diabetes    High levels of fetal hemoglobin interfere with the HbA1C  assay. Heterozygous hemoglobin variants (HbS, HgC, etc)do  not significantly interfere with this assay.   However, presence of multiple variants may affect accuracy.      Estimated Avg Glucose 07/19/2023 154 (H)  68 - 131 mg/dL Final    Sodium 07/19/2023 139  136 - 145 mmol/L Final    Potassium 07/19/2023 4.0  3.5 - 5.1 mmol/L Final    Chloride 07/19/2023 104  95 - 110 mmol/L Final    CO2 07/19/2023 27  23 - 29 mmol/L Final    Glucose 07/19/2023 143 (H)  70 - 110 mg/dL Final    BUN 07/19/2023 15  8 - 23 mg/dL Final    Creatinine 07/19/2023 0.9  0.5 - 1.4 mg/dL Final    Calcium 07/19/2023 9.2  8.7 - 10.5 mg/dL Final    Anion Gap 07/19/2023 8  8 - 16 mmol/L Final    eGFR 07/19/2023 >60  >60 mL/min/1.73 m^2 Final       Imaging  No results found.    Assessment  1. Acute on chronic diastolic heart failure  Still mildly volume overloaded but improved    2. Atherosclerosis of native coronary artery of native heart without angina pectoris  Stable    3. Primary hypertension  Better controlled    4. Hypercholesterolemia  Controlled    5. Supraventricular tachycardia  Controlled on current medical regimen      Plan and Discussion    No change to present management.  Discussed self titration of diuretics.  At most, twice daily dosing.    The 10-year ASCVD risk score (Litch[place] DK, et al., 2019) is: 36.2%    Values used to calculate the score:      Age: 68 years      Sex: Male      Is Non- : No      Diabetic: Yes      Tobacco smoker: Yes      Systolic Blood Pressure: 116 mmHg      Is BP treated: Yes      HDL Cholesterol: 61 mg/dL      Total Cholesterol: 221 mg/dL     Follow Up  Follow up in about 3 months (around 3/5/2024).      Shemar Dempsey MD

## 2023-12-06 ENCOUNTER — CLINICAL SUPPORT (OUTPATIENT)
Dept: DIABETES | Facility: CLINIC | Age: 68
End: 2023-12-06
Payer: MEDICARE

## 2023-12-06 ENCOUNTER — PATIENT MESSAGE (OUTPATIENT)
Dept: DIABETES | Facility: CLINIC | Age: 68
End: 2023-12-06

## 2023-12-06 DIAGNOSIS — E11.65 TYPE 2 DIABETES MELLITUS WITH HYPERGLYCEMIA, WITH LONG-TERM CURRENT USE OF INSULIN: Primary | ICD-10-CM

## 2023-12-06 DIAGNOSIS — Z79.4 TYPE 2 DIABETES MELLITUS WITH HYPERGLYCEMIA, WITH LONG-TERM CURRENT USE OF INSULIN: Primary | ICD-10-CM

## 2023-12-06 PROCEDURE — 99999 PR PBB SHADOW E&M-EST. PATIENT-LVL III: CPT | Mod: PBBFAC,,,

## 2023-12-06 PROCEDURE — G0108 PR DIAB MANAGE TRN  PER INDIV: ICD-10-PCS | Mod: S$GLB,,,

## 2023-12-06 PROCEDURE — 99999 PR PBB SHADOW E&M-EST. PATIENT-LVL III: ICD-10-PCS | Mod: PBBFAC,,,

## 2023-12-06 PROCEDURE — G0108 DIAB MANAGE TRN  PER INDIV: HCPCS | Mod: S$GLB,,,

## 2023-12-07 ENCOUNTER — PATIENT MESSAGE (OUTPATIENT)
Dept: ENDOCRINOLOGY | Facility: CLINIC | Age: 68
End: 2023-12-07
Payer: MEDICARE

## 2023-12-07 NOTE — PROGRESS NOTES
Diabetes Care Specialist Progress Note  Author: Lalitha Stroud RN, CDE  Date: 12/7/2023    Program Intake  Reason for Diabetes Program Visit:: Initial Diabetes Assessment  Current diabetes risk level:: low    Lab Results   Component Value Date    HGBA1C 7.0 (H) 12/01/2023     Diabetes Self-Management Skills Assessment    Diabetes Disease Process/Treatment Options  Diabetes Disease Process/Treatment Options: Skills Assessment Completed: No  Deferred due to:: Time    Nutrition/Healthy Eating  Nutrition/Healthy Eating Skills Assessment Completed:: No  Deffered due to:: Time    Physical Activity/Exercise  Physical Activity/Exercise Skills Assessment Completed: : No  Deffered due to:: Time    Medications  Patient is able to describe current diabetes management routine.: yes  Diabetes management routine:: diet, insulin  Patient is able to identify current diabetes medications, dosages, and appropriate timing of medications.: yes  Patient understands the purpose of the medications taken for diabetes.: yes  Patient reports problems or concerns with current medication regimen.: yes  Medication regimen problems/concerns:: financial concerns, other (see comments) (pt researching availability of echo pen due to increased affordability from the inPen)  Medication Skills Assessment Completed:: Yes  Assessment indicates:: Adequate understanding  Area of need?: No    Home Blood Glucose Monitoring  Patient states that blood sugar is checked at home daily.: yes  Monitoring Method:: personal continuous glucose monitor  Patient is able to use personal CGM appropriately.: yes  CGM Report reviewed?: yes  Home Blood Glucose Monitoring Skills Assessment Completed: : Yes  Assessment indicates:: Adequate understanding  Area of need?: No    Acute Complications  Acute Complications Skills Assessment Completed: : No  Deffered due to:: Time    Chronic Complications  Chronic Complications Skills Assessment Completed: : No  Deferred due to::  Time    Psychosocial/Coping  Psychosocial/Coping Skills Assessment Completed: : No  Deffered due to:: Time      Assessment Summary and Plan    Based on today's diabetes care assessment, the following areas of need were identified:          10/11/2023    12:01 AM   Social   Support No   Access to Mass Media/Tech No   Cognitive/Behavioral Health No   Culture/Confucianist No   Communication No   Health Literacy No            3/22/2023    12:02 AM   Clinical   Nutritional Status No            12/6/2023    12:01 AM   Diabetes Self-Management Skills   Medication No   Home Blood Glucose Monitoring No      Today's interventions were provided through individual discussion, instruction, and written materials were provided.      Patient verbalized understanding of instruction and written materials.  Pt was able to return back demonstration of instructions today. Patient understood key points, needs reinforcement and further instruction.     Diabetes Self-Management Care Plan:    Today's Diabetes Self-Management Care Plan was developed with Nithin's input. Nithin has agreed to work toward the following goal(s) to improve his/her overall diabetes control.      There are no recently modified care plans to display for this patient.      Follow Up Plan     Follow up in about 9 weeks (around 2/7/2024) for dexcom upload and review. .    Today's care plan and follow up schedule was discussed with patient.  Nithin verbalized understanding of the care plan, goals, and agrees to follow up plan.        The patient was encouraged to communicate with his/her health care provider/physician and care team regarding his/her condition(s) and treatment.  I provided the patient with my contact information today and encouraged to contact me via phone or [facility] Patient Portal as needed.     Length of Visit   Total Time: 60 Minutes

## 2023-12-26 RX ORDER — PREDNISONE 2.5 MG/1
TABLET ORAL
Qty: 150 TABLET | Refills: 2 | Status: SHIPPED | OUTPATIENT
Start: 2023-12-26 | End: 2024-03-12

## 2023-12-28 RX ORDER — OLMESARTAN MEDOXOMIL 40 MG/1
TABLET ORAL
Qty: 90 TABLET | Refills: 3 | Status: SHIPPED | OUTPATIENT
Start: 2023-12-28

## 2024-01-01 DIAGNOSIS — I25.118 ATHEROSCLEROSIS OF NATIVE CORONARY ARTERY OF NATIVE HEART WITH STABLE ANGINA PECTORIS: ICD-10-CM

## 2024-01-01 NOTE — ASSESSMENT & PLAN NOTE
bp controlled  continue regimen  F/u with PCP, monitor     Physician confirms case reviewed for anesthesia consultation requirements. Physician confirms case reviewed for anesthesia consultation requirements.

## 2024-01-02 RX ORDER — NITROGLYCERIN 0.4 MG/1
0.4 TABLET SUBLINGUAL EVERY 5 MIN PRN
Qty: 100 TABLET | Refills: 2 | Status: SHIPPED | OUTPATIENT
Start: 2024-01-02

## 2024-01-04 ENCOUNTER — PATIENT MESSAGE (OUTPATIENT)
Dept: ENDOCRINOLOGY | Facility: CLINIC | Age: 69
End: 2024-01-04
Payer: MEDICARE

## 2024-01-05 ENCOUNTER — OFFICE VISIT (OUTPATIENT)
Dept: ENDOCRINOLOGY | Facility: CLINIC | Age: 69
End: 2024-01-05
Payer: MEDICARE

## 2024-01-05 DIAGNOSIS — E78.5 HYPERLIPIDEMIA, UNSPECIFIED HYPERLIPIDEMIA TYPE: ICD-10-CM

## 2024-01-05 DIAGNOSIS — M06.9 RHEUMATOID ARTHRITIS FLARE: ICD-10-CM

## 2024-01-05 DIAGNOSIS — E66.09 CLASS 1 OBESITY DUE TO EXCESS CALORIES WITH SERIOUS COMORBIDITY AND BODY MASS INDEX (BMI) OF 34.0 TO 34.9 IN ADULT: ICD-10-CM

## 2024-01-05 DIAGNOSIS — Z79.4 TYPE 2 DIABETES MELLITUS WITH OTHER CIRCULATORY COMPLICATION, WITH LONG-TERM CURRENT USE OF INSULIN: ICD-10-CM

## 2024-01-05 DIAGNOSIS — K76.0 NAFLD (NONALCOHOLIC FATTY LIVER DISEASE): ICD-10-CM

## 2024-01-05 DIAGNOSIS — I10 ESSENTIAL HYPERTENSION: ICD-10-CM

## 2024-01-05 DIAGNOSIS — Z79.4 TYPE 2 DIABETES MELLITUS WITH HYPERGLYCEMIA, WITH LONG-TERM CURRENT USE OF INSULIN: Primary | ICD-10-CM

## 2024-01-05 DIAGNOSIS — E11.59 TYPE 2 DIABETES MELLITUS WITH OTHER CIRCULATORY COMPLICATION, WITH LONG-TERM CURRENT USE OF INSULIN: ICD-10-CM

## 2024-01-05 DIAGNOSIS — M81.0 OSTEOPOROSIS, UNSPECIFIED OSTEOPOROSIS TYPE, UNSPECIFIED PATHOLOGICAL FRACTURE PRESENCE: ICD-10-CM

## 2024-01-05 DIAGNOSIS — E66.01 SEVERE OBESITY: ICD-10-CM

## 2024-01-05 DIAGNOSIS — F17.200 TOBACCO USE DISORDER: ICD-10-CM

## 2024-01-05 DIAGNOSIS — E55.9 VITAMIN D DEFICIENCY: ICD-10-CM

## 2024-01-05 DIAGNOSIS — E11.65 TYPE 2 DIABETES MELLITUS WITH HYPERGLYCEMIA, WITH LONG-TERM CURRENT USE OF INSULIN: Primary | ICD-10-CM

## 2024-01-05 PROBLEM — E22.1 HYPERPROLACTINEMIA: Chronic | Status: RESOLVED | Noted: 2023-06-19 | Resolved: 2024-01-05

## 2024-01-05 PROCEDURE — 99214 OFFICE O/P EST MOD 30 MIN: CPT | Mod: 25,95,, | Performed by: NURSE PRACTITIONER

## 2024-01-05 PROCEDURE — 1159F MED LIST DOCD IN RCRD: CPT | Mod: CPTII,95,, | Performed by: NURSE PRACTITIONER

## 2024-01-05 PROCEDURE — 1160F RVW MEDS BY RX/DR IN RCRD: CPT | Mod: CPTII,95,, | Performed by: NURSE PRACTITIONER

## 2024-01-05 PROCEDURE — 3072F LOW RISK FOR RETINOPATHY: CPT | Mod: CPTII,95,, | Performed by: NURSE PRACTITIONER

## 2024-01-05 PROCEDURE — 95251 CONT GLUC MNTR ANALYSIS I&R: CPT | Mod: NDTC,S$GLB,, | Performed by: NURSE PRACTITIONER

## 2024-01-05 RX ORDER — ERGOCALCIFEROL 1.25 MG/1
50000 CAPSULE ORAL
Qty: 12 CAPSULE | Refills: 3 | Status: SHIPPED | OUTPATIENT
Start: 2024-01-05 | End: 2024-01-05

## 2024-01-05 RX ORDER — PREDNISONE 5 MG/1
10 TABLET ORAL DAILY
Qty: 60 TABLET | Refills: 3 | Status: SHIPPED | OUTPATIENT
Start: 2024-01-05 | End: 2024-05-04

## 2024-01-05 RX ORDER — PREDNISONE 10 MG/1
10 TABLET ORAL DAILY
Qty: 1 TABLET | Refills: 0 | Status: SHIPPED | OUTPATIENT
Start: 2024-01-05 | End: 2024-01-06

## 2024-01-05 RX ORDER — ERGOCALCIFEROL 1.25 MG/1
50000 CAPSULE ORAL
Qty: 12 CAPSULE | Refills: 3 | Status: SHIPPED | OUTPATIENT
Start: 2024-01-05

## 2024-01-05 RX ORDER — TIRZEPATIDE 5 MG/.5ML
5 INJECTION, SOLUTION SUBCUTANEOUS
Qty: 4 PEN | Refills: 0 | Status: SHIPPED | OUTPATIENT
Start: 2024-01-05 | End: 2024-02-07 | Stop reason: SDUPTHER

## 2024-01-05 RX ORDER — SEMAGLUTIDE 0.68 MG/ML
0.5 INJECTION, SOLUTION SUBCUTANEOUS
Qty: 3 ML | Refills: 11 | Status: SHIPPED | OUTPATIENT
Start: 2024-01-05 | End: 2025-01-04

## 2024-01-05 NOTE — ASSESSMENT & PLAN NOTE
Reviewed goals of therapy - to get the best control we can without hypoglycemia. Goal <7.5   - last a1c at goal    Check labs in March 2024   Dexcom reviewed:  Blood sugars are very variable. He is giving several doses of short acting insulin daily due to steroids but he is chasing high and low blood sugars.   He is insulin stacking likely due to high dose steroids. Encouraged to give fiasp 30 minutes prior to a meal. He is weaning steroids.   Very mismatched with basal and bolus, due to steroids.    Medication Changes   INPEN with Fiasp cartridges   I:C 1:8   Change ISF 15   Max    Continue Tresiba U200 60 units daily  Increase Mounjaro 5 mg weekly which will hopefully decrease his insulin requirements   Consult patient care assistance for insulin and for Ozempic  Consult diabetes education     Recommended daily allowance of calcium is 6758-4619 mg daily, preferably from food. See below  Recommended daily allowance of vitamin D is 6903-6904 IU daily     Encouraged weight bearing exercise  Encouraged to avoid falls    Reviewed patient's current insulin regimen. Clarified proper insulin dose and timing in relation to meals, etc. Insulin injection sites and proper rotation instructed.   - reviewed dose adjustments as well.   -Advised frequent self blood glucose monitoring. Patient encouraged to document glucose results and bring them to every clinic visit    -Hypoglycemia precautions discussed. Instructed on precautions before driving.    -Close adherence to lifestyle changes recommended.    -Periodic follow ups for eye evaluations, foot care suggested.  Not on statin due to allergy - consider PCSK9 in future

## 2024-01-05 NOTE — PROGRESS NOTES
Subjective:      Chief Complaint: No chief complaint on file.    HPI: Nithin Wagner is a 68 y.o. male with diabetes, dyslipidemia, hypertension, diastolic heart failure, PVD, RA, vitamin D deficiency, osteoporosis, obesity, and tobacco use who is here for a follow-up evaluation for diabetes. Last seen by me in Nov 2023.    The patient location is: at home  The chief complaint leading to consultation is: diabetes    Visit type: audiovisual    Face to Face time with patient: 25 minutes   35 minutes of total time spent on the encounter, which includes face to face time and non-face to face time preparing to see the patient (eg, review of tests), Obtaining and/or reviewing separately obtained history, Documenting clinical information in the electronic or other health record, Independently interpreting results (not separately reported) and communicating results to the patient/family/caregiver, or Care coordination (not separately reported).    Each patient to whom he or she provides medical services by telemedicine is:  (1) informed of the relationship between the physician and patient and the respective role of any other health care provider with respect to management of the patient; and (2) notified that he or she may decline to receive medical services by telemedicine and may withdraw from such care at any time.    Patient reported in 2007, had heart issues (atrial myxoma), surgery, issues with anesthesia. Then had pain, f/u showed lung issues, then surgery 2008. Later RA, on mtx, other meds. Including steroids.    States he has lost around 70 pounds since Jan 2022 -diet and addition of Mounjaro. 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     Since his last visit, we tried to obtain ECHO but no longer manufactured. He is now using INPEN.     States pain is worse with cold. He has been increasing his prednisone.   He has been more dizzy lately- fall yesterday     At his last  visit, he informed us he wanted to decrease wean steroids. He was on prednisone 15 mg in AM and 15 mg in PM. He was down to prednisone 20 mg daily and reports he started getting CFS. He increased steroids back up to 35 mg daily but he would like to wean again.      With regards to the diabetes:  Diagnosed with T2DM since around [id number]. Had symptoms, sugar 300. Metformin initially, other pills, GLP1, then lately insulin.     Known complications:    Retinopathy- Denies; has upcoming appt    Nephropathy- No    Neuropathy- No    CAD? Yes, hx stents. Also HFpEF    CVA- TIA with myxoma, denies CVA in the past     Today, using inpen   He has stopped using omnipod-cost prohibitive.    Medications  Mounjaro 2.5 mg weekly   Tresiba 60 units daily  INPEN with novolog cartridges   Average <100 units of novolog daily   I:C 1:8 a  ISF 5  Max      Did not change ISF to 15 last visit     He is giving novolg 6-10 times daily; giving about 70 units of novolog daily  He is changing needle every time. He is rotating injection sites. He is giving novolog before a meal 45 minutes     Missed doses-denies     Other medications tried:   onglyza-caused HF   metformin. Wasn't helping as much   other meds   Trulicity: 4.5 mg/week. Tuesdays. Stopped in the hospital   Basal insulin: Tresiba 44 units daily   Prandial insulin: Novolog 14 units TIDWM plus scale.     #4 times a day testing  Dexcom   See media tab    Log reviewed: Blood sugars are very variable. He is giving several doses of short acting insulin daily due to steroids but he is chasing high and low blood sugars.     Hypoglycemic event- He corrects a blood sugar dropping to 100 States occurring 4 times daily   Knows how to correct with 15 grams of carbs- juice, coke, or a peppermint.     Dietary recall: He is working on diet.  Eats 2 meals a day (50-60 carbs) with 3 fair to large snacks (30 carbs) .   Trying to do low carb diet.   Snacks: cheese and crackers or sardines    Drinks: caffeine in coffee spikes blood sugar and was drinking 3 pots in the past     Exercise - He is trying to incorporate exercise -walking and using rubber bands     Education - last visit: 6/2023    Has a Medic alert tag- has pendant for falls   Has been having  more pain when walking lately  Glucagon/Baqsimi- does not want    Diabetes Management Status  Statin: Not taking- did not tolerate  ACE/ARB: Taking    Lab Results   Component Value Date    HGBA1C 7.0 (H) 12/01/2023    HGBA1C 7.1 (H) 09/29/2023    HGBA1C 7.0 (H) 07/19/2023     Screening or Prevention Patient's value Goal Complete/Controlled?   HgA1C Testing and Control   Lab Results   Component Value Date    HGBA1C 7.0 (H) 12/01/2023      Annually/Less than 8% Yes   Lipid profile : 12/01/2023 Annually Yes   LDL control Lab Results   Component Value Date    LDLCALC 128.6 12/01/2023    Annually/Less than 100 mg/dl  No   Nephropathy screening Lab Results   Component Value Date    LABMICR 6.0 04/19/2023     Lab Results   Component Value Date    PROTEINUA Negative 01/26/2023    Annually Yes   Blood pressure BP Readings from Last 1 Encounters:   12/05/23 116/68    Less than 140/90 Yes   Dilated retinal exam : 10/09/2023 Annually Yes   Foot exam   : 05/06/2021 Annually No     He is seeing hepatology      Latest Reference Range & Units 09/21/23 11:54 09/29/23 15:01   AST 10 - 40 U/L 25 39   ALT 10 - 44 U/L 51 (H) 89 (H)   (H): Data is abnormally high    With regards to osteoporosis,     Based on FRAX score     He is on ergo 50k weekly and MVI with 1000 IU daily  He has calcium in diet; eating more cheese and dairy     current medication: none and does not want treatment     Weight bearing exercise- Walking and also using rubber bands for strength training   Recent falls- stumbles and grabs himself once every 2 to 3 days; actual fall in Feb/March 2023    They have made fall precautions in house   Dental work- denies     No recent fractures   No significant  height loss (>2 inches)    tob use -  1 ppd   etoh use-denies      No current diarrhea or h/o malabsorption  + chronic exposure to steroid therapy,  - anticoagulants, proton pump inhibitors or antiseizure medications.     + GERD, indigestion,   Denies gastric bypass surgery.     Denies history of thyroid disease, anemia, kidney stones or kidney disease.     Denies active malignancy, history of malignancy the involved the bone, prior radiation treatment, or unexplained elevations of alk phos on labs     Bone density    7/5/23 DXA  FINDINGS:  The bone mineral density measured from L1 through L4 is 1.307g/cm2.  This corresponds to a T score of 0.6 and a Z score of 0.5.  Decrease in bone mineral density by 4%.     The bone mineral density within the left femoral neck measures 0.816 g/cm2.  This corresponds to a T score of -2.0 and a Z score of -1.1.     The bone mineral density within the right femoral neck measures 0.855 g/cm2.  This corresponds to a T score of -1.7 and a Z score of -0.8.  Decrease in bone mineral density by 10%.     FRAX RESULTS:     10-year Probability of Fracture:   Major Osteoporotic Fracture 10.3%.   Hip Fracture 4.1%.   Impression:     Osteopenia both hips with decrease in bone mineral density by 10%.   Latest Reference Range & Units 09/29/23 15:01   Vit D, 25-Hydroxy 30 - 96 ng/mL 30     With regards to dyslipidemia,   Unable to tolerate statin- allergy  States he has tried several in the past and caused heart arrhythmia   Does not want to try praulent or repatha      Latest Reference Range & Units 12/01/23 08:05   Cholesterol Total 120 - 199 mg/dL 221 (H)   HDL 40 - 75 mg/dL 61   HDL/Cholesterol Ratio 20.0 - 50.0 % 27.6   Non-HDL Cholesterol mg/dL 160   Total Cholesterol/HDL Ratio 2.0 - 5.0  3.6   Triglycerides 30 - 150 mg/dL 157 (H)   LDL Cholesterol 63.0 - 159.0 mg/dL 128.6   (H): Data is abnormally high    With regards to elevated prolactin-now normal,    Now normal    Currently pt reports  no galactorrhea  + diplopia that recently started around 2 months ago  - chronic headaches    No unexplained weight changes.  + fatigue  He does report some sleep problems. Has pulse ox- decreasing O2. States he has CPAP but unable to tolerate mask. States he has scarring on trachea from intubation in the past    Denies cold intolerance   No nausea or vomiting  + orthostatic symptoms     The patient is not currently using any medication known to cause hyperprolactinemia such as: antipsychotics (risperidone, phenothiazines, haloperidol and butyrophenones),gastric motility drugs (metoclopramide and domperidone), or antihypertensives (methyldopa, reserpine, and verapamil).     Latest Reference Range & Units 06/21/22 07:23   Prolactin 3.5 - 19.4 ng/mL 28.8 (H)      Latest Reference Range & Units 06/21/22 07:23   FSH 0.95 - 11.95 mIU/mL 5.93   LH 0.6 - 12.1 mIU/mL 2.4   Prolactin 3.5 - 19.4 ng/mL 28.8 (H)   Sex Hormone Binding Globulin 22 - 77 nmol/L 79 (H)   Testosterone 250 - 1100 ng/dL 542   Testosterone, Bioavailable 110.0 - 575.0 ng/dL 63.7 (L)   Testosterone, Free 46.0 - 224.0 pg/mL 33.8 (L)      Latest Reference Range & Units 06/21/22 07:23 07/19/23 09:17   Prolactin 3.5 - 19.4 ng/mL 28.8 (H) 19.0   (H): Data is abnormally high    Reviewed past medical, family, social history and updated as appropriate.     Review of Systems   Constitutional:  Positive for fatigue. Negative for unexpected weight change.   Eyes:  Negative for visual disturbance.   Endocrine: Positive for polyphagia. Negative for polydipsia and polyuria.        Hair loss   Musculoskeletal:  Positive for arthralgias, back pain and gait problem.     As above    Objective:     There were no vitals filed for this visit.    Prior vitals:  BP Readings from Last 5 Encounters:   12/05/23 116/68   10/26/23 (!) 164/82   10/24/23 132/74   09/21/23 (!) 161/95   06/22/23 126/70     Physical Exam  Vitals reviewed.   Constitutional:       Appearance: He is obese.    Neurological:      Mental Status: He is alert.       Wt Readings from Last 10 Encounters:   12/05/23 1425 106.6 kg (235 lb)   10/26/23 0956 102.5 kg (226 lb)   10/24/23 1411 108.7 kg (239 lb 9.6 oz)   09/21/23 1052 99.8 kg (220 lb)   06/22/23 1422 98.4 kg (217 lb)   04/26/23 1254 94.5 kg (208 lb 5.4 oz)   04/25/23 1238 94.5 kg (208 lb 5.4 oz)   03/31/23 1551 94.3 kg (208 lb)   03/22/23 1347 94.4 kg (208 lb 1.8 oz)   03/01/23 1441 94 kg (207 lb 3.7 oz)     Lab Results   Component Value Date    HGBA1C 7.0 (H) 12/01/2023     Lab Results   Component Value Date    CHOL 221 (H) 12/01/2023    HDL 61 12/01/2023    LDLCALC 128.6 12/01/2023    TRIG 157 (H) 12/01/2023    CHOLHDL 27.6 12/01/2023     Lab Results   Component Value Date     12/01/2023     12/01/2023    K 3.8 12/01/2023    K 3.8 12/01/2023     12/01/2023     12/01/2023    CO2 29 12/01/2023    CO2 29 12/01/2023     (H) 12/01/2023     (H) 12/01/2023    BUN 28 (H) 12/01/2023    BUN 28 (H) 12/01/2023    CREATININE 1.3 12/01/2023    CREATININE 1.3 12/01/2023    CALCIUM 9.3 12/01/2023    CALCIUM 9.3 12/01/2023    PROT 7.1 12/01/2023    PROT 7.1 12/01/2023    ALBUMIN 3.6 12/01/2023    ALBUMIN 3.6 12/01/2023    BILITOT 0.4 12/01/2023    BILITOT 0.4 12/01/2023    ALKPHOS 62 12/01/2023    ALKPHOS 62 12/01/2023    AST 28 12/01/2023    AST 28 12/01/2023    ALT 49 (H) 12/01/2023    ALT 49 (H) 12/01/2023    ANIONGAP 12 12/01/2023    ANIONGAP 12 12/01/2023    ESTGFRAFRICA >60 07/21/2022    EGFRNONAA >60 07/21/2022    TSH 2.405 07/19/2023      Lab Results   Component Value Date    MICALBCREAT 7.6 04/19/2023     Assessment/Plan:     1. Type 2 diabetes mellitus with hyperglycemia, with long-term current use of insulin  tirzepatide (MOUNJARO) 5 mg/0.5 mL PnIj    semaglutide (OZEMPIC) 0.25 mg or 0.5 mg (2 mg/3 mL) pen injector    Ambulatory referral/consult to Diabetes Education      2. Vitamin D deficiency  ergocalciferol (ERGOCALCIFEROL)  50,000 unit Cap    DISCONTINUED: ergocalciferol (ERGOCALCIFEROL) 50,000 unit Cap      3. Class 1 obesity due to excess calories with serious comorbidity and body mass index (BMI) of 34.0 to 34.9 in adult        4. Osteoporosis, unspecified osteoporosis type, unspecified pathological fracture presence        5. Tobacco use disorder        6. Hyperlipidemia, unspecified hyperlipidemia type        7. Essential hypertension        8. Severe obesity        9. Type 2 diabetes mellitus with other circulatory complication, with long-term current use of insulin        10. Rheumatoid arthritis flare  predniSONE (DELTASONE) 10 MG tablet    predniSONE (DELTASONE) 5 MG tablet      11. NAFLD (nonalcoholic fatty liver disease)          Type 2 diabetes mellitus with hyperglycemia, with long-term current use of insulin  Reviewed goals of therapy - to get the best control we can without hypoglycemia. Goal <7.5   - last a1c at goal    Check labs in March 2024   Dexcom reviewed:  Blood sugars are very variable. He is giving several doses of short acting insulin daily due to steroids but he is chasing high and low blood sugars.   He is insulin stacking likely due to high dose steroids. Encouraged to give fiasp 30 minutes prior to a meal. He is weaning steroids.   Very mismatched with basal and bolus, due to steroids.    Medication Changes   INPEN with Fiasp cartridges   I:C 1:8   Change ISF 15   Max    Continue Tresiba U200 60 units daily  Increase Mounjaro 5 mg weekly which will hopefully decrease his insulin requirements   Consult patient care assistance for insulin and for Ozempic  Consult diabetes education     Recommended daily allowance of calcium is 5473-2945 mg daily, preferably from food. See below  Recommended daily allowance of vitamin D is 6183-8875 IU daily     Encouraged weight bearing exercise  Encouraged to avoid falls    Reviewed patient's current insulin regimen. Clarified proper insulin dose and timing in  relation to meals, etc. Insulin injection sites and proper rotation instructed.   - reviewed dose adjustments as well.   -Advised frequent self blood glucose monitoring. Patient encouraged to document glucose results and bring them to every clinic visit    -Hypoglycemia precautions discussed. Instructed on precautions before driving.    -Close adherence to lifestyle changes recommended.    -Periodic follow ups for eye evaluations, foot care suggested.  Not on statin due to allergy - consider PCSK9 in future    Vitamin D deficiency  Continue Vitamin D        Class 1 obesity due to excess calories with serious comorbidity and body mass index (BMI) of 34.0 to 34.9 in adult  Increase Mounjaro as above     Osteoporosis              Based on FRAX score               Does want to try osteoporosis medications              Avoid falls               Stop smoking               Continue exercise              Continue vitamin D and calcium in diet  Has significant reflux - would avoid PO bisphosphonate's   Reclast -having significant RA pains at this time Will avoid for now.  Prolia -he would like to avoid due to increased risk of infection     Tobacco use disorder  Smokes 1.5 ppd and not interested in smoking cessation       Hyperlipidemia  Allergy to statin  Not interested in trying PCSK9    Essential hypertension  bp controlled  continue regimen  F/u with PCP, monitor      Severe obesity  As above    Type 2 diabetes mellitus with other circulatory complication, with long-term current use of insulin  As ab ove    NAFLD (nonalcoholic fatty liver disease)  Monitor LFTs    Agrees to look at AVS    Follow up in about 8 weeks (around 3/1/2024).

## 2024-01-05 NOTE — ASSESSMENT & PLAN NOTE
Based on FRAX score               Does want to try osteoporosis medications              Avoid falls               Stop smoking               Continue exercise              Continue vitamin D and calcium in diet  Has significant reflux - would avoid PO bisphosphonate's   Reclast -having significant RA pains at this time Will avoid for now.  Prolia -he would like to avoid due to increased risk of infection

## 2024-01-05 NOTE — PATIENT INSTRUCTIONS
Steroid reduction               He is currently on   Prednisone 35 mg daily -     Osteoporosis                           Based on FRAX score               Does want to try osteoporosis medications              Avoid falls               Stop smoking               Continue exercise              Continue vitamin D and calcium in diet  Has significant reflux - would avoid PO bisphosphonate's   Reclast -having significant RA pains at this time Will avoid for now.  Prolia -he would like to avoid due to increased risk of infection     Cholesterol               LDL goal less than 70              Consider Praulent in the future if needed   Check LP in future       Diabetes  Check labs in March 2024   Dexcom reviewed:  Blood sugars are very variable. He is giving several doses of short acting insulin daily due to steroids but he is chasing high and low blood sugars.   He is insulin stacking likely due to high dose steroids. Encouraged to give fiasp 30 minutes prior to a meal. He is weaning steroids.   Very mismatched with basal and bolus, due to steroids.    Medication Changes   INPEN with Fiasp cartridges   I:C 1:8   Change ISF 15   Max    Continue Tresiba U200 60 units daily  Increase Mounjaro 5 mg weekly which will hopefully decrease his insulin requirements   Consult patient care assistance for insulin and for Ozempic  Consult diabetes education     Recommended daily allowance of calcium is 5833-5114 mg daily, preferably from food. See below  Recommended daily allowance of vitamin D is 8755-8869 IU daily     Encouraged weight bearing exercise  Encouraged to avoid falls

## 2024-01-09 ENCOUNTER — PATIENT MESSAGE (OUTPATIENT)
Dept: ENDOCRINOLOGY | Facility: CLINIC | Age: 69
End: 2024-01-09
Payer: MEDICARE

## 2024-01-10 ENCOUNTER — PATIENT OUTREACH (OUTPATIENT)
Dept: ADMINISTRATIVE | Facility: OTHER | Age: 69
End: 2024-01-10

## 2024-01-10 ENCOUNTER — OFFICE VISIT (OUTPATIENT)
Dept: RHEUMATOLOGY | Facility: CLINIC | Age: 69
End: 2024-01-10
Payer: MEDICARE

## 2024-01-10 DIAGNOSIS — M06.9 RHEUMATOID ARTHRITIS INVOLVING MULTIPLE JOINTS: Primary | ICD-10-CM

## 2024-01-10 PROCEDURE — 3072F LOW RISK FOR RETINOPATHY: CPT | Mod: CPTII,95,, | Performed by: INTERNAL MEDICINE

## 2024-01-10 PROCEDURE — 99215 OFFICE O/P EST HI 40 MIN: CPT | Mod: 95,,, | Performed by: INTERNAL MEDICINE

## 2024-01-10 RX ORDER — LEFLUNOMIDE 10 MG/1
10 TABLET ORAL DAILY
Qty: 30 TABLET | Refills: 0 | Status: SHIPPED | OUTPATIENT
Start: 2024-01-10 | End: 2024-01-23

## 2024-01-10 NOTE — PROGRESS NOTES
1/10/2024     2:30 PM   Rapid3 Question Responses and Scores   MDHAQ Score 1.2   Psychologic Score 3.3   Pain Score 5.5   When you awakened in the morning OVER THE LAST WEEK, did you feel stiff? Yes   If Yes, please indicate the number of hours until you are as limber as you will be for the day 1   Fatigue Score 7   Global Health Score 6   RAPID3 Score 5.17     Answers submitted by the patient for this visit:  Rheumatology Questionnaire (Submitted on 1/10/2024)  fever: No  eye redness: No  mouth sores: Yes  headaches: No  shortness of breath: No  chest pain: No  trouble swallowing: No  diarrhea: No  constipation: No  unexpected weight change: No  genital sore: No  During the last 3 days, have you had a skin rash?: No  Bruises or bleeds easily: Yes  cough: No

## 2024-01-10 NOTE — PROGRESS NOTES
Subjective:       Patient ID: Nithin Wagner is a 64 y.o. male.    Chief Complaint: No chief complaint on file.    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   He was 53 when he was diagnosed with RA. He reports he reports he was having pain and swelling in knees and ankles. He then he had involvement in hands and shoulders. He was put on MTX.  He reports that the methotrexate did help with joints but it caused confusion so he stopped it for 2 years. He was taken aleve which helped his pain for 2 years.Then he developed HTN on NSAIDS so he stopped them.  He was then put on hydrocodone 5 QID to control his pain. Reports that warm weather improves his pain. He is back on methotrexate. Today, it will be his 5th dose. He is taking folic acid 1 mg po qday.  He has never tried up to 8 pills once a week. He is taking prednisone 10mg po BID for 4 weeks. He still has some pain in ankles and knees.  He does get mild swelling in knees and ankles. He was previously on prednisone 40mg a day. He smokes 1 pack per day for 40 years    family hx:cousin: connective tissue disease    Interval history: He is on prednisone 35 mg a day. He was diagnosed with squamous cell carcinoma.   He continues to decline any medicine for his RA.  Pain level is 8/0 aching and non radiating. He reports extreme fatigue when trying to wean off prednisone.    Past Medical History:   Diagnosis Date    Arthritis     Diabetes mellitus     Hyperlipidemia     Hypertension        Review of Systems      Review of Systems   Constitutional: Negative for fever.   Eyes: Negative for redness.   Respiratory: Negative for cough, hemoptysis, sputum production and shortness of breath.    Cardiovascular: Negative for chest pain.   Gastrointestinal: Negative for constipation and diarrhea.   Musculoskeletal: Positive for  joint pain.   Skin: Negative for rash.   Neurological: Negative for headaches.   Endo/Heme/Allergies: Does not bruise/bleed easily.         Objective:   There were no vitals taken for this visit.     Physical Exam   Constitutional: He is oriented to person, place, and time and well-developed, well-nourished, and in no distress. No distress.   HENT:   Head: Normocephalic and atraumatic.   Right Ear: External ear normal.   Eyes: Conjunctivae and EOM are normal. Pupils are equal, round, and reactive to light. Right eye exhibits no discharge. Left eye exhibits no discharge. No scleral icterus.   Neck: Normal range of motion. Neck supple. No JVD present. No tracheal deviation present. No thyromegaly present.   Cardiovascular: Normal rate and regular rhythm.    Pulmonary/Chest: Effort normal and breath sounds normal. No stridor. No respiratory distress. He has no wheezes. He has no rales. He exhibits no tenderness.   Abdominal: Soft. Bowel sounds are normal. He exhibits no distension and no mass. There is no tenderness. There is no rebound and no guarding.   Lymphadenopathy:     He has no cervical adenopathy.   Neurological: He is alert and oriented to person, place, and time. He displays normal reflexes. No cranial nerve deficit. He exhibits normal muscle tone. Coordination normal.   Skin: Skin is warm and dry. No rash noted. He is not diaphoretic. No erythema. No pallor.     Musculoskeletal: He exhibits edema and tenderness. He exhibits no deformity.      labs: reviewed  Results for CECILIA MEAD (MRN [id number]) as of 4/14/2020 12:44   Ref. Range 4/7/2020 13:52   CCP Antibodies Latest Ref Range: <5.0 U/mL 336.9 (H)   Rheumatoid Factor Latest Ref Range: 0.0 - 15.0 IU/mL 456.0 (H)       No data to display     Assessment:   68  year old with PMH of  Type II DM, HTN, gout, right atrial myxoma s/p surgery in 2018,  CAD s/p 3 stents, PE, right upper lobectomy secondary to right atrial myxoma, latent TB s/p INH x6 in  1982,Polycythemia, MARI    here  For follow up of  rheumatoid arthritis. He was requiring high doses of steroid to function when we initially met.    Given that he has +hep B core antibody, he was started on lamivudine by hepatology but he had to stop due to GI upset.  I took him off  methotrexate give his baseline thrombocytopenia, mild LFT elevation, and prior lung surgery. MTX made him confused.  He was doing good on enbrel but developed CHF.      He is not able to function with less than prednisone 17.5mg a day. I TOLD HIM  I am concerned about long term side effects of steroids.  He tried  Orencia shots without improvement.  I was going to switch him to orencia infusions but insurance company did not pay for it. 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 His last shot of Kevzara was in September. He does not think Kevzara helped him off the steroids.  He underwent emergent excision and debridement of necrotic tissue on 11/15. Repeat washout performed 11/17/22.  He was then discharged to [facility].  Plan was to start him on Orencia infusions but he would like to hold off on biologics due to recent recurrent infections.   I discussed trial of SSZ or Arava but he prefers to wait.  I told him if he continues on this path with high doses of steroids, he will have serious consequences like deadly infection, osteonecrosis, GI bleed, osteoporosis among others.  He verbalizes understanding.    I told him that he needs to discuss with endocrinology steroid dependence given at this point he can go into adrenal insufficiency.    Plan:       Problem List Items Addressed This Visit       None        Wean prednisone by 2.5 mg per week  No MTX given MARI and baseline thrombocytopenia, poor lung reserve  Continue folic acid 1 mg po qday   -continue  lamivudine for Hep B core antibody  Follow up with GI  No anti-tnf given CHF  No rosales kinase given history of PE  Follow up in 12  weeks    The patient location is: home  The chief complaint leading to consultation is: joint pain    Visit type: audiovisual    Face to Face time with patient: 30   minutes of total time spent on the encounter, which includes face to face time and non-face to face time preparing to see the patient (eg, review of tests), Obtaining and/or reviewing separately obtained history, Documenting clinical information in the electronic or other health record, Independently interpreting results (not separately reported) and communicating results to the patient/family/caregiver, or Care coordination (not separately reported).         Each patient to whom he or she provides medical services by telemedicine is:  (1) informed of the relationship between the physician and patient and the respective role of any other health care provider with respect to management of the patient; and (2) notified that he or she may decline to receive medical services by telemedicine and may withdraw from such care at any time.    Notes:

## 2024-01-12 ENCOUNTER — PATIENT MESSAGE (OUTPATIENT)
Dept: ADMINISTRATIVE | Facility: OTHER | Age: 69
End: 2024-01-12
Payer: MEDICARE

## 2024-01-12 NOTE — PROGRESS NOTES
CHW - Outreach Attempt    Community Health Worker left a In Basket message for 1st attempt to contact patient regarding: SDOH  Community Health Worker to attempt to contact patient on: 1/26  CHW - Outreach Attempt    Community Health Worker left a voicemail message for 2nd attempt to contact patient regarding: SDOH  Community Health Worker to attempt to contact patient on: 2/19  CHW - Case Closure    This Community Health Worker spoke to patient via telephone today.   Pt/Caregiver reported: Pt denied needing any assistance at this time. Pt stated that he has been experiencing back pain and he has opted to complete his provider visits via Telehealth. Pt stated that he has a narcotic medication that cannot be delivered from [facility] but he may have a way to pick-up his medication. Pt stated that it is to be determined but for now he is okay. SDOH was completed verified updated by CHW  Pt/Caregiver denied any additional needs at this time and agrees with episode closure at this time.  Provided patient with Community Health Worker's contact information and encouraged him/her to contact this Community Health Worker if additional needs arise.

## 2024-01-21 ENCOUNTER — PATIENT MESSAGE (OUTPATIENT)
Dept: CARDIOLOGY | Facility: CLINIC | Age: 69
End: 2024-01-21
Payer: MEDICARE

## 2024-01-21 DIAGNOSIS — I50.22 CHRONIC SYSTOLIC HEART FAILURE: Primary | ICD-10-CM

## 2024-01-22 RX ORDER — FUROSEMIDE 80 MG/1
80 TABLET ORAL DAILY
Qty: 90 TABLET | Refills: 3 | Status: SHIPPED | OUTPATIENT
Start: 2024-01-22 | End: 2024-03-05

## 2024-01-23 ENCOUNTER — PATIENT MESSAGE (OUTPATIENT)
Dept: RHEUMATOLOGY | Facility: CLINIC | Age: 69
End: 2024-01-23
Payer: MEDICARE

## 2024-01-23 RX ORDER — LEFLUNOMIDE 10 MG/1
10 TABLET ORAL
Qty: 30 TABLET | Refills: 0 | Status: SHIPPED | OUTPATIENT
Start: 2024-01-23 | End: 2024-01-23 | Stop reason: ALTCHOICE

## 2024-01-24 ENCOUNTER — PATIENT MESSAGE (OUTPATIENT)
Dept: DIABETES | Facility: CLINIC | Age: 69
End: 2024-01-24
Payer: MEDICARE

## 2024-01-26 DIAGNOSIS — Z23 NEED FOR POST EXPOSURE PROPHYLAXIS FOR HEPATITIS B: ICD-10-CM

## 2024-01-26 DIAGNOSIS — Z20.828 NEED FOR POST EXPOSURE PROPHYLAXIS FOR HEPATITIS B: ICD-10-CM

## 2024-01-26 RX ORDER — LAMIVUDINE 150 MG/1
TABLET, FILM COATED ORAL
Qty: 30 TABLET | Refills: 23 | Status: SHIPPED | OUTPATIENT
Start: 2024-01-26

## 2024-01-30 ENCOUNTER — OFFICE VISIT (OUTPATIENT)
Dept: OPHTHALMOLOGY | Facility: CLINIC | Age: 69
End: 2024-01-30
Payer: MEDICARE

## 2024-01-30 DIAGNOSIS — H25.13 NUCLEAR SCLEROSIS, BILATERAL: Primary | ICD-10-CM

## 2024-01-30 PROCEDURE — 1101F PT FALLS ASSESS-DOCD LE1/YR: CPT | Mod: CPTII,S$GLB,, | Performed by: OPHTHALMOLOGY

## 2024-01-30 PROCEDURE — 3051F HG A1C>EQUAL 7.0%<8.0%: CPT | Mod: CPTII,S$GLB,, | Performed by: OPHTHALMOLOGY

## 2024-01-30 PROCEDURE — 1126F AMNT PAIN NOTED NONE PRSNT: CPT | Mod: CPTII,S$GLB,, | Performed by: OPHTHALMOLOGY

## 2024-01-30 PROCEDURE — 3061F NEG MICROALBUMINURIA REV: CPT | Mod: CPTII,S$GLB,, | Performed by: OPHTHALMOLOGY

## 2024-01-30 PROCEDURE — 92004 COMPRE OPH EXAM NEW PT 1/>: CPT | Mod: S$GLB,,, | Performed by: OPHTHALMOLOGY

## 2024-01-30 PROCEDURE — 3066F NEPHROPATHY DOC TX: CPT | Mod: CPTII,S$GLB,, | Performed by: OPHTHALMOLOGY

## 2024-01-30 PROCEDURE — 99999 PR PBB SHADOW E&M-EST. PATIENT-LVL IV: CPT | Mod: PBBFAC,,, | Performed by: OPHTHALMOLOGY

## 2024-01-30 PROCEDURE — 3288F FALL RISK ASSESSMENT DOCD: CPT | Mod: CPTII,S$GLB,, | Performed by: OPHTHALMOLOGY

## 2024-01-30 NOTE — PROGRESS NOTES
HPI    Patient present today for Cataract Evaluation   Pt state [facility] not clear when reading. Double vision looking distance  Glare at night [facility]. Pt denies pain and discomfort. Ocular migraine on/off   No f/f      Eye meds: NA   Last edited by Frances Fu on 1/30/2024 10:36 AM.            Assessment /Plan     For exam results, see Encounter Report.    Nuclear sclerosis, bilateral      Visually Significant Cataract: Patient reports decreased vision consistent with the clinical amount of lenticular opacity, which reaches the level of visual significance and affects activities of daily living.     Specifically, this patient describes difficulty with:  - driving safely at night  - reading road signs  - reading small print  - deciphering medicine bottles  - reading the newspaper  - using the phone  - reading texts     Risks, benefits, and alternatives to cataract surgery were discussed and the consent reviewed. IOL options were discussed, including ATIOLs and the associated side effects and additional patient cost associated with them.   IOL Selections:   Right eye  IOL: CNA0T0 21.0      Left eye  IOL: CNA0T0  20.0     Pt wishes to have RIGHT eye FIRST

## 2024-02-07 ENCOUNTER — PATIENT MESSAGE (OUTPATIENT)
Dept: OPHTHALMOLOGY | Facility: CLINIC | Age: 69
End: 2024-02-07
Payer: MEDICARE

## 2024-02-07 ENCOUNTER — PATIENT MESSAGE (OUTPATIENT)
Dept: DIABETES | Facility: CLINIC | Age: 69
End: 2024-02-07

## 2024-02-07 ENCOUNTER — CLINICAL SUPPORT (OUTPATIENT)
Dept: DIABETES | Facility: CLINIC | Age: 69
End: 2024-02-07
Payer: MEDICARE

## 2024-02-07 ENCOUNTER — TELEPHONE (OUTPATIENT)
Dept: OPHTHALMOLOGY | Facility: CLINIC | Age: 69
End: 2024-02-07
Payer: MEDICARE

## 2024-02-07 DIAGNOSIS — E11.65 TYPE 2 DIABETES MELLITUS WITH HYPERGLYCEMIA, WITH LONG-TERM CURRENT USE OF INSULIN: ICD-10-CM

## 2024-02-07 DIAGNOSIS — Z79.4 TYPE 2 DIABETES MELLITUS WITH HYPERGLYCEMIA, WITH LONG-TERM CURRENT USE OF INSULIN: ICD-10-CM

## 2024-02-07 PROCEDURE — G0108 DIAB MANAGE TRN  PER INDIV: HCPCS | Mod: 95,,,

## 2024-02-07 RX ORDER — TIRZEPATIDE 5 MG/.5ML
5 INJECTION, SOLUTION SUBCUTANEOUS
Qty: 4 PEN | Refills: 0 | Status: SHIPPED | OUTPATIENT
Start: 2024-02-07 | End: 2024-03-04 | Stop reason: SDUPTHER

## 2024-02-07 NOTE — TELEPHONE ENCOUNTER
----- Message from Lashonda Fowler sent at 2/7/2024  9:41 AM CST -----  Consult/Advisory    Name Of Caller:Nithin       Contact Preference:[phone]    Nature of call: discuss sx and have a few questions

## 2024-02-08 NOTE — PROGRESS NOTES
Diabetes Care Specialist Virtual Visit Note     The patient location is: [place]  The chief complaint leading to consultation is: Diabetes  Visit type: audiovisual  Total time spent with patient: 30 min   Each patient to whom he or she provides medical services by telemedicine is:  (1) informed of the relationship between the physician and patient and the respective role of any other health care provider with respect to management of the patient; and (2) notified that he or she may decline to receive medical services by telemedicine and may withdraw from such care at any time.     Diabetes Care Specialist Progress Note  Author: Lalitha Stroud RN, CDE  Date: 2/8/2024    Program Intake  Reason for Diabetes Program Visit:: Intervention  Type of Intervention:: Individual  Individual: Education  Education: Self-Management Skill Review, Nutrition and Meal Planning, Pattern Management  Current diabetes risk level:: low  Continuous Glucose Monitoring  Patient has CGM: Yes  Personal CGM type:: Dexcom  GMI Date: 02/07/24  GMI Value: 7.3 %    Lab Results   Component Value Date    HGBA1C 7.0 (H) 12/01/2023     Diabetes Self-Management Skills Assessment    Home Blood Glucose Monitoring  Personal CGM type:: Dexcom    Assessment Summary and Plan    Based on today's diabetes care assessment, the following areas of need were identified:          10/11/2023    12:01 AM   Social   Support No   Access to Mass Media/Tech No   Cognitive/Behavioral Health No   Culture/[place] No   Communication No   Health Literacy No          3/22/2023    12:02 AM   Clinical   Nutritional Status No          12/6/2023    12:01 AM   Diabetes Self-Management Skills   Medication No - some new changes since starting Mounjaro - is having reduced insulin needs and requires assistance adjusting inPen settings based on NP recs.    Home Blood Glucose Monitoring No      Today's interventions were provided through individual discussion, instruction, and written  materials were provided.      Patient verbalized understanding of instruction and written materials.  Pt was able to return back demonstration of instructions today. Patient understood key points, needs reinforcement and further instruction.     Diabetes Self-Management Care Plan:    Today's Diabetes Self-Management Care Plan was developed with Nithin's input. Nithin has agreed to work toward the following goal(s) to improve his/her overall diabetes control.      Care Plan: Diabetes Management   Updates made since 1/9/2024 12:00 AM        Problem: Medications         Goal: Pt will change ISF to 15 per Izabel Scobel instructions, and reduce I:C to 8 (vs 5).    Start Date: 2/7/2024   Expected End Date: 3/9/2024   Priority: High   Barriers: No Barriers Identified   Note:    Discussed setting changes instructed by Izabel to inPen settings. Doing well w/ 5mg Mounjaro and seeing reduced insulin needs. Feels ok with increasing to 7.5mg. Will request from provider.        Task: Reviewed with patient all current diabetes medications and provided basic review of the purpose, dosage, frequency, side effects, and storage of both oral and injectable diabetes medications. Completed 2/8/2024        Task: Reviewed possible resources for acquiring cost prohibitive medication. Completed 2/8/2024        Task: Discussed guidelines for preventing, detecting and treating hypoglycemia and hyperglycemia and reviewed the importance of meal and medication timing with diabetes mediations for prevention of hypoglycemia and maximum drug benefit. Completed 2/8/2024        Follow Up Plan     Follow up in about 4 weeks (around 3/6/2024) for review of settings and adjustments. .    Today's care plan and follow up schedule was discussed with patient.  Nithin verbalized understanding of the care plan, goals, and agrees to follow up plan.        The patient was encouraged to communicate with his/her health care provider/physician and care team  regarding his/her condition(s) and treatment.  I provided the patient with my contact information today and encouraged to contact me via phone or [facility] Patient Portal as needed.     Length of Visit   Total Time: 30 Minutes

## 2024-02-09 ENCOUNTER — HOSPITAL ENCOUNTER (EMERGENCY)
Facility: OTHER | Age: 69
Discharge: HOME | End: 2024-02-09
Attending: EMERGENCY MEDICINE
Payer: MEDICARE

## 2024-02-09 VITALS
TEMPERATURE: 99 F | RESPIRATION RATE: 20 BRPM | SYSTOLIC BLOOD PRESSURE: 134 MMHG | OXYGEN SATURATION: 93 % | WEIGHT: 220 LBS | DIASTOLIC BLOOD PRESSURE: 82 MMHG | HEIGHT: 72 IN | HEART RATE: 93 BPM | BODY MASS INDEX: 29.8 KG/M2

## 2024-02-09 DIAGNOSIS — R07.9 CHEST PAIN: ICD-10-CM

## 2024-02-09 DIAGNOSIS — R06.00 DYSPNEA, UNSPECIFIED TYPE: Primary | ICD-10-CM

## 2024-02-09 DIAGNOSIS — L30.9 DERMATITIS: ICD-10-CM

## 2024-02-09 LAB
ALBUMIN SERPL BCP-MCNC: 3.6 G/DL (ref 3.5–5.2)
ALP SERPL-CCNC: 70 U/L (ref 55–135)
ALT SERPL W/O P-5'-P-CCNC: 49 U/L (ref 10–44)
ANION GAP SERPL CALC-SCNC: 13 MMOL/L (ref 8–16)
AST SERPL-CCNC: 26 U/L (ref 10–40)
BASOPHILS # BLD AUTO: 0.02 K/UL (ref 0–0.2)
BASOPHILS NFR BLD: 0.2 % (ref 0–1.9)
BILIRUB SERPL-MCNC: 0.4 MG/DL (ref 0.1–1)
BNP SERPL-MCNC: 176 PG/ML (ref 0–99)
BUN SERPL-MCNC: 20 MG/DL (ref 8–23)
CALCIUM SERPL-MCNC: 8.7 MG/DL (ref 8.7–10.5)
CHLORIDE SERPL-SCNC: 98 MMOL/L (ref 95–110)
CO2 SERPL-SCNC: 27 MMOL/L (ref 23–29)
CREAT SERPL-MCNC: 1.1 MG/DL (ref 0.5–1.4)
CTP QC/QA: YES
CTP QC/QA: YES
DIFFERENTIAL METHOD BLD: ABNORMAL
EOSINOPHIL # BLD AUTO: 0 K/UL (ref 0–0.5)
EOSINOPHIL NFR BLD: 0.3 % (ref 0–8)
ERYTHROCYTE [DISTWIDTH] IN BLOOD BY AUTOMATED COUNT: 13.5 % (ref 11.5–14.5)
EST. GFR  (NO RACE VARIABLE): >60 ML/MIN/1.73 M^2
GLUCOSE SERPL-MCNC: 172 MG/DL (ref 70–110)
HCT VFR BLD AUTO: 54 % (ref 40–54)
HGB BLD-MCNC: 18.1 G/DL (ref 14–18)
IMM GRANULOCYTES # BLD AUTO: 0.12 K/UL (ref 0–0.04)
IMM GRANULOCYTES NFR BLD AUTO: 1.3 % (ref 0–0.5)
LYMPHOCYTES # BLD AUTO: 0.9 K/UL (ref 1–4.8)
LYMPHOCYTES NFR BLD: 10.1 % (ref 18–48)
MCH RBC QN AUTO: 33.8 PG (ref 27–31)
MCHC RBC AUTO-ENTMCNC: 33.5 G/DL (ref 32–36)
MCV RBC AUTO: 101 FL (ref 82–98)
MONOCYTES # BLD AUTO: 0.7 K/UL (ref 0.3–1)
MONOCYTES NFR BLD: 7.7 % (ref 4–15)
NEUTROPHILS # BLD AUTO: 7.3 K/UL (ref 1.8–7.7)
NEUTROPHILS NFR BLD: 80.4 % (ref 38–73)
NRBC BLD-RTO: 0 /100 WBC
PLATELET # BLD AUTO: 188 K/UL (ref 150–450)
PMV BLD AUTO: 10.7 FL (ref 9.2–12.9)
POC MOLECULAR INFLUENZA A AGN: NEGATIVE
POC MOLECULAR INFLUENZA B AGN: NEGATIVE
POTASSIUM SERPL-SCNC: 4.1 MMOL/L (ref 3.5–5.1)
PROT SERPL-MCNC: 6.6 G/DL (ref 6–8.4)
RBC # BLD AUTO: 5.35 M/UL (ref 4.6–6.2)
SARS-COV-2 RDRP RESP QL NAA+PROBE: NEGATIVE
SODIUM SERPL-SCNC: 138 MMOL/L (ref 136–145)
TROPONIN I SERPL DL<=0.01 NG/ML-MCNC: 0.02 NG/ML (ref 0–0.03)
WBC # BLD AUTO: 9.07 K/UL (ref 3.9–12.7)

## 2024-02-09 PROCEDURE — 99285 EMERGENCY DEPT VISIT HI MDM: CPT | Mod: 25

## 2024-02-09 PROCEDURE — 80053 COMPREHEN METABOLIC PANEL: CPT | Performed by: EMERGENCY MEDICINE

## 2024-02-09 PROCEDURE — 85025 COMPLETE CBC W/AUTO DIFF WBC: CPT | Performed by: EMERGENCY MEDICINE

## 2024-02-09 PROCEDURE — 93010 ELECTROCARDIOGRAM REPORT: CPT | Mod: ,,, | Performed by: INTERNAL MEDICINE

## 2024-02-09 PROCEDURE — 83880 ASSAY OF NATRIURETIC PEPTIDE: CPT | Performed by: EMERGENCY MEDICINE

## 2024-02-09 PROCEDURE — 96374 THER/PROPH/DIAG INJ IV PUSH: CPT

## 2024-02-09 PROCEDURE — 87635 SARS-COV-2 COVID-19 AMP PRB: CPT | Performed by: EMERGENCY MEDICINE

## 2024-02-09 PROCEDURE — 84484 ASSAY OF TROPONIN QUANT: CPT | Performed by: EMERGENCY MEDICINE

## 2024-02-09 PROCEDURE — 36415 COLL VENOUS BLD VENIPUNCTURE: CPT | Performed by: EMERGENCY MEDICINE

## 2024-02-09 PROCEDURE — 63600175 PHARM REV CODE 636 W HCPCS: Performed by: EMERGENCY MEDICINE

## 2024-02-09 PROCEDURE — 93005 ELECTROCARDIOGRAM TRACING: CPT

## 2024-02-09 PROCEDURE — 25000003 PHARM REV CODE 250: Performed by: EMERGENCY MEDICINE

## 2024-02-09 RX ORDER — CYCLOBENZAPRINE HCL 10 MG
10 TABLET ORAL 3 TIMES DAILY PRN
Qty: 15 TABLET | Refills: 0 | Status: SHIPPED | OUTPATIENT
Start: 2024-02-09 | End: 2024-02-14

## 2024-02-09 RX ORDER — ORPHENADRINE CITRATE 100 MG/1
100 TABLET, EXTENDED RELEASE ORAL
Status: COMPLETED | OUTPATIENT
Start: 2024-02-09 | End: 2024-02-09

## 2024-02-09 RX ORDER — FUROSEMIDE 10 MG/ML
40 INJECTION INTRAMUSCULAR; INTRAVENOUS
Status: COMPLETED | OUTPATIENT
Start: 2024-02-09 | End: 2024-02-09

## 2024-02-09 RX ADMIN — FUROSEMIDE 40 MG: 10 INJECTION, SOLUTION INTRAMUSCULAR; INTRAVENOUS at 03:02

## 2024-02-09 RX ADMIN — ORPHENADRINE CITRATE 100 MG: 100 TABLET, EXTENDED RELEASE ORAL at 03:02

## 2024-02-09 NOTE — ED PROVIDER NOTES
"SCRIBE #1 NOTE: I, Lizy Sheriff, am scribing for, and in the presence of,  Rakesh Soares DO. I have scribed the following portions of the note - Other sections scribed: HPI, ROS, PE.         Source of History:  Patient    Chief complaint:  Chest Pain (Pt reports onset of R sided chest pain that radiates to center back onset 7 days ago with worsening SOB. Pt has hx of CHF, recent increase lasix dose)      HPI:  Nithin Wagner is a 68 y.o. male with PMHx of CHF and chronic lower back pain presents with multiple complaints, chiefly worsening dyspnea on exertion since yesterday. Patient has had intermittent bilateral leg swelling for some time and was improving over the past six days with Lasix. His swelling began to worsen yesterday. While he is typically able to walk several blocks before needing to catch his breath, now he becomes winded even walking six feet. Patient also reports recent worsening of his chronic back pain, and this is further exacerbated by movement. Lastly, he reports a recent rash to his scrotum which he believes may be either "jock itch" or a yeast infection. 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       This is the extent to the patients complaints today here in the emergency department.    ROS:   See HPI.    Review of patient's allergies indicates:   Allergen Reactions    Enbrel [etanercept] Shortness Of Breath     CHF    Nsaids (non-steroidal anti-inflammatory drug) Other (See Comments)     hypertention  Other reaction(s): Other (See Comments)  hypertention  HTN    Statins-hmg-coa reductase inhibitors Other (See Comments)     Heart arrythemias  Other reaction(s): Other (See Comments)  Heart arrythemias  Joint pain and cardiac arrythmias    Pcn [penicillins] Rash       PMH:  As per HPI and below:  Past Medical History:   Diagnosis Date    Arthritis     Atrial myxoma     Coronary atherosclerosis     Diabetes " mellitus, type 2     Difficult intubation     Heart failure     Hepatitis B     Hyperlipidemia     Hypertension     Non-alcoholic fatty liver disease     Rheumatoid arthritis     Rheumatoid arthritis flare 07/12/2021    Squamous cell carcinoma of forehead 10/26/2023    forehead    Stroke     TIA     Past Surgical History:   Procedure Laterality Date    CORONARY ANGIOGRAPHY N/A 3/10/2021    Procedure: ANGIOGRAM, CORONARY ARTERY - right radial;  Surgeon: Shemar Dempsey MD;  Location: [facility] CATH LAB;  Service: Cardiology;  Laterality: N/A;    CORONARY STENT PLACEMENT  03/10/2021    prox-mid RCA Marshal 4.5 x 26 mm, 4.5 x 12 mm    INCISION AND DRAINAGE OF SCROTUM N/A 11/15/2022    Procedure: INCISION AND DRAINAGE, SCROTUM;  Surgeon: William Hatch MD;  Location: [facility] OR;  Service: Urology;  Laterality: N/A;    INCISION AND DRAINAGE OF SCROTUM N/A 11/17/2022    Procedure: INCISION AND DRAINAGE, SCROTUM;  Surgeon: William Hatch MD;  Location: [facility] OR;  Service: Urology;  Laterality: N/A;    LUNG LOBECTOMY Right 2008    RUL lobectomy after removal of atrial myxoma    PLEURA BIOPSY      RESECTION OF ATRIAL MYXOMA  2007       Social History     Tobacco Use    Smoking status: Every Day     Current packs/day: 1.00     Average packs/day: 1 pack/day for 35.0 years (35.0 ttl pk-yrs)     Types: Cigarettes    Smokeless tobacco: Never   Substance Use Topics    Alcohol use: Not Currently    Drug use: No       Physical Exam:    /82   Pulse 93   Temp 98.9 °F (37.2 °C) (Oral)   Resp 20   Ht 6' (1.829 m)   Wt 99.8 kg (220 lb)   SpO2 (!) 93%   BMI 29.84 kg/m²   Nursing note and vital signs reviewed.  Constitutional: No acute distress.  Nontoxic  Cardiovascular: Regular rate and rhythm.  No murmurs. No gallops. No rubs  Respiratory: Clear to auscultation bilaterally.  Good air movement.  No wheezes.  No rhonchi. No rales. No accessory muscle use..  Abdomen: Soft.  Not distended.  Nontender.  No guarding.  No rebound.  Non-peritoneal.  Musculoskeletal: Upper right paraspinal lumbar tenderness with notable spasm compared to left. Good range of motion all joints.  No deformities.  Neck supple.  No meningismus.  Extremities: 2+ pitting edema of the bilateral lower extremities.  : Small area of dermatitis under the scrotum, at the crease of the groin. No induration or fluctuance. Normal temperature. Not tender to palpation. No drainage.  Neuro: alert. At baseline.    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  Cultures came back with Enterococcus and prevotella species.    Differential Dx considered but limited to:    CHF, I have considered but have a low suspicion for acute coronary syndrome.  I have also considered but do not suspect pulmonary embolus or pneumothorax.  No evidence of infectious etiology such as pneumonia.      Regarding the back pain suspect muscle spasm.  Possible renal colic.    Regarding the rash appears to be moisture dermatitis.  Considered but have a low suspicion for Mary's gangrene.  There is no evidence of cellulitis or abscess.    MDM/ Workup:  68-year-old male with multiple complaints.  Will get labs on this gentleman including EKG and chest x-ray.  Very low suspicion for Mary's gangrene do not suspect this needs further workup at this time.  Patient also then complain of some cough congestion for the last 3-4 days so will check COVID as well as influenza.      ED Course as of 02/09/24 1834   Fri Feb 09, 2024   1505 EKG 12-lead  EKG independently interpreted by myself shows normal sinus rhythm at a rate of 90, normal intervals, narrow QRS, no acute ST T wave abnormalities.  Overall impression no acute disease.  Compared to an EKG November 28, 2022 shows no change. [SM]   1615 Sodium: 138 [SM]   1615 Potassium: 4.1 [SM]   1615 BNP(!): 176  [SM]   1615 WBC: 9.07 [SM]   1615 Hemoglobin(!): 18.1  16-17 is baseline [SM]   1615 Platelet Count: 188 [SM]   1615 X-Ray Chest AP Portable  Chest x-ray independently interpreted by myself shows normal heart size, some increased infiltrate/interstitial prominence in the right middle lobe.  No cephalization no pneumothorax.  Increased interstitial markings in the right middle lobe questionable fluid overload versus pneumonia. [SM]   1644 Troponin I: 0.020 [SM]   1827 POC Molecular Influenza A Ag: Negative [SM]   1828 POC Molecular Influenza B Ag: Negative [SM]   1828 SARS-CoV-2 RNA, Amplification, Qual: Negative [SM]   1828 SpO2(!): 93 %  Patient states he does have home oxygen that he uses as needed. [SM]   1832 On re-evaluation patient states he feels 100% better.  Do not feel he warrants hospital admission at this time the patient states he does not want to stay.  I feel this is reasonable.  With shared medical decision-making we agreed to continue his 80 mg Lasix as recommended by his primary care doctor.  If symptoms worsen he is to return for re-evaluation.  Also regarding the rash if it becomes painful, fevers or worsening without any improvement to return for re-evaluation.    No further workup is indicated in the emergency department today.  I updated pt regarding results and I counseled pt regarding supportive care measures.  Diagnosis and treatment plan explained to patient. I have answered all questions and the patient is satisfied with the plan of care. Patient discharged home in stable condition.  [SM]      ED Course User Index  [] Rakesh Soares, DO                Scribe Attestation:   Scribe #1: I performed the above scribed service and the documentation accurately describes the services I performed. I attest to the accuracy of the note.    Physician Attestation for Scribe: I, Dr Rakesh Soares, reviewed documentation as scribed in my presence, which is both accurate and  complete.    Diagnostic Impression:    1. Dyspnea, unspecified type    2. Chest pain    3. Dermatitis         ED Disposition Condition    Discharge Stable            ED Prescriptions       Medication Sig Dispense Start Date End Date Auth. Provider    cyclobenzaprine (FLEXERIL) 10 MG tablet Take 1 tablet (10 mg total) by mouth 3 (three) times daily as needed for Muscle spasms. 15 tablet 2/9/2024 2/14/2024 Rakesh Soares, DO          Follow-up Information       Follow up With Specialties Details Why Contact Info    Tushar Drew MD Internal Medicine Call in 3 days  [address] [facility] [address]  [phone]               Rakesh Soares,   02/09/24 8019

## 2024-02-09 NOTE — ED TRIAGE NOTES
Pt presents to the ED with c/o SOB and back pain x1 week. Pt reports hx of CHF and is currently on lasix. Pt endorses increase in dosage. Pt AAOx3, NAD noted.

## 2024-02-10 ENCOUNTER — PATIENT OUTREACH (OUTPATIENT)
Dept: EMERGENCY MEDICINE | Facility: HOSPITAL | Age: 69
End: 2024-02-10

## 2024-02-12 LAB
OHS QRS DURATION: 100 MS
OHS QRS DURATION: 98 MS
OHS QTC CALCULATION: 410 MS
OHS QTC CALCULATION: 415 MS

## 2024-02-29 DIAGNOSIS — M54.51 VERTEBROGENIC LOW BACK PAIN: Primary | ICD-10-CM

## 2024-03-01 ENCOUNTER — HOSPITAL ENCOUNTER (OUTPATIENT)
Dept: RADIOLOGY | Facility: OTHER | Age: 69
Discharge: HOME | End: 2024-03-01
Attending: INTERNAL MEDICINE
Payer: MEDICARE

## 2024-03-01 DIAGNOSIS — I25.10 CORONARY ARTERY DISEASE: ICD-10-CM

## 2024-03-01 DIAGNOSIS — F17.200 TOBACCO USE DISORDER: ICD-10-CM

## 2024-03-01 DIAGNOSIS — K12.0 CANKER SORES ORAL: ICD-10-CM

## 2024-03-01 DIAGNOSIS — M54.51 VERTEBROGENIC LOW BACK PAIN: ICD-10-CM

## 2024-03-01 DIAGNOSIS — D75.1 POLYCYTHEMIA, SECONDARY: ICD-10-CM

## 2024-03-01 DIAGNOSIS — I10 ESSENTIAL HYPERTENSION: ICD-10-CM

## 2024-03-01 DIAGNOSIS — E11.65 TYPE 2 DIABETES MELLITUS WITH HYPERGLYCEMIA, WITH LONG-TERM CURRENT USE OF INSULIN: ICD-10-CM

## 2024-03-01 DIAGNOSIS — M81.0 OSTEOPOROSIS: ICD-10-CM

## 2024-03-01 DIAGNOSIS — K76.0 NAFLD (NONALCOHOLIC FATTY LIVER DISEASE): ICD-10-CM

## 2024-03-01 DIAGNOSIS — E78.5 HYPERLIPIDEMIA: ICD-10-CM

## 2024-03-01 DIAGNOSIS — Z79.4 TYPE 2 DIABETES MELLITUS WITH HYPERGLYCEMIA, WITH LONG-TERM CURRENT USE OF INSULIN: ICD-10-CM

## 2024-03-01 DIAGNOSIS — I50.32 CHRONIC DIASTOLIC HEART FAILURE: ICD-10-CM

## 2024-03-01 DIAGNOSIS — F41.9 ANXIETY AND DEPRESSION: ICD-10-CM

## 2024-03-01 DIAGNOSIS — L73.9 FOLLICULITIS: ICD-10-CM

## 2024-03-01 DIAGNOSIS — F32.A ANXIETY AND DEPRESSION: ICD-10-CM

## 2024-03-01 DIAGNOSIS — M05.79 RHEUMATOID ARTHRITIS INVOLVING MULTIPLE SITES WITH POSITIVE RHEUMATOID FACTOR: ICD-10-CM

## 2024-03-01 DIAGNOSIS — E66.09 CLASS 1 OBESITY DUE TO EXCESS CALORIES WITH SERIOUS COMORBIDITY AND BODY MASS INDEX (BMI) OF 34.0 TO 34.9 IN ADULT: ICD-10-CM

## 2024-03-01 DIAGNOSIS — R53.83 FATIGUE: ICD-10-CM

## 2024-03-01 DIAGNOSIS — I70.209 ATHEROSCLEROTIC PERIPHERAL VASCULAR DISEASE: ICD-10-CM

## 2024-03-01 DIAGNOSIS — E66.01 SEVERE OBESITY: ICD-10-CM

## 2024-03-01 DIAGNOSIS — Z79.891 CHRONIC USE OF OPIATE DRUG FOR THERAPEUTIC PURPOSE: ICD-10-CM

## 2024-03-01 DIAGNOSIS — E55.9 VITAMIN D DEFICIENCY: ICD-10-CM

## 2024-03-01 DIAGNOSIS — I50.20 SYSTOLIC HEART FAILURE: ICD-10-CM

## 2024-03-01 DIAGNOSIS — E87.1 HYPONATREMIA: ICD-10-CM

## 2024-03-01 DIAGNOSIS — M06.9 RHEUMATOID ARTHRITIS FLARE: ICD-10-CM

## 2024-03-01 DIAGNOSIS — R76.8 HEPATITIS B CORE ANTIBODY POSITIVE: ICD-10-CM

## 2024-03-01 DIAGNOSIS — E11.59 TYPE 2 DIABETES MELLITUS WITH OTHER CIRCULATORY COMPLICATION, WITH LONG-TERM CURRENT USE OF INSULIN: Primary | ICD-10-CM

## 2024-03-01 DIAGNOSIS — Z79.4 TYPE 2 DIABETES MELLITUS WITH OTHER CIRCULATORY COMPLICATION, WITH LONG-TERM CURRENT USE OF INSULIN: Primary | ICD-10-CM

## 2024-03-01 DIAGNOSIS — Z79.52 LONG TERM (CURRENT) USE OF SYSTEMIC STEROIDS: ICD-10-CM

## 2024-03-01 PROCEDURE — 72070 X-RAY EXAM THORAC SPINE 2VWS: CPT | Mod: TC,FY

## 2024-03-01 PROCEDURE — 72070 X-RAY EXAM THORAC SPINE 2VWS: CPT | Mod: 26,,, | Performed by: RADIOLOGY

## 2024-03-01 PROCEDURE — 72100 X-RAY EXAM L-S SPINE 2/3 VWS: CPT | Mod: 26,,, | Performed by: RADIOLOGY

## 2024-03-01 PROCEDURE — 72100 X-RAY EXAM L-S SPINE 2/3 VWS: CPT | Mod: TC,FY

## 2024-03-04 ENCOUNTER — PATIENT MESSAGE (OUTPATIENT)
Dept: CARDIOLOGY | Facility: CLINIC | Age: 69
End: 2024-03-04
Payer: MEDICARE

## 2024-03-04 DIAGNOSIS — Z79.4 TYPE 2 DIABETES MELLITUS WITH HYPERGLYCEMIA, WITH LONG-TERM CURRENT USE OF INSULIN: ICD-10-CM

## 2024-03-04 DIAGNOSIS — E11.65 TYPE 2 DIABETES MELLITUS WITH HYPERGLYCEMIA, WITH LONG-TERM CURRENT USE OF INSULIN: ICD-10-CM

## 2024-03-04 RX ORDER — TIRZEPATIDE 5 MG/.5ML
5 INJECTION, SOLUTION SUBCUTANEOUS
Qty: 4 PEN | Refills: 0 | Status: SHIPPED | OUTPATIENT
Start: 2024-03-04 | End: 2024-03-06 | Stop reason: SDUPTHER

## 2024-03-05 ENCOUNTER — OFFICE VISIT (OUTPATIENT)
Dept: CARDIOLOGY | Facility: CLINIC | Age: 69
End: 2024-03-05
Payer: MEDICARE

## 2024-03-05 VITALS — DIASTOLIC BLOOD PRESSURE: 83 MMHG | SYSTOLIC BLOOD PRESSURE: 116 MMHG

## 2024-03-05 DIAGNOSIS — I10 PRIMARY HYPERTENSION: ICD-10-CM

## 2024-03-05 DIAGNOSIS — I50.33 ACUTE ON CHRONIC DIASTOLIC HEART FAILURE: Primary | ICD-10-CM

## 2024-03-05 DIAGNOSIS — I50.22 CHRONIC SYSTOLIC HEART FAILURE: ICD-10-CM

## 2024-03-05 DIAGNOSIS — I25.10 ATHEROSCLEROSIS OF NATIVE CORONARY ARTERY OF NATIVE HEART WITHOUT ANGINA PECTORIS: ICD-10-CM

## 2024-03-05 DIAGNOSIS — I47.10 SUPRAVENTRICULAR TACHYCARDIA: ICD-10-CM

## 2024-03-05 PROCEDURE — 3074F SYST BP LT 130 MM HG: CPT | Mod: CPTII,95,, | Performed by: INTERNAL MEDICINE

## 2024-03-05 PROCEDURE — 3066F NEPHROPATHY DOC TX: CPT | Mod: CPTII,95,, | Performed by: INTERNAL MEDICINE

## 2024-03-05 PROCEDURE — 99214 OFFICE O/P EST MOD 30 MIN: CPT | Mod: 95,,, | Performed by: INTERNAL MEDICINE

## 2024-03-05 PROCEDURE — 1159F MED LIST DOCD IN RCRD: CPT | Mod: CPTII,95,, | Performed by: INTERNAL MEDICINE

## 2024-03-05 PROCEDURE — 3051F HG A1C>EQUAL 7.0%<8.0%: CPT | Mod: CPTII,95,, | Performed by: INTERNAL MEDICINE

## 2024-03-05 PROCEDURE — 1160F RVW MEDS BY RX/DR IN RCRD: CPT | Mod: CPTII,95,, | Performed by: INTERNAL MEDICINE

## 2024-03-05 PROCEDURE — 3079F DIAST BP 80-89 MM HG: CPT | Mod: CPTII,95,, | Performed by: INTERNAL MEDICINE

## 2024-03-05 PROCEDURE — 3061F NEG MICROALBUMINURIA REV: CPT | Mod: CPTII,95,, | Performed by: INTERNAL MEDICINE

## 2024-03-05 RX ORDER — FUROSEMIDE 80 MG/1
80 TABLET ORAL 2 TIMES DAILY
Qty: 180 TABLET | Refills: 3 | Status: ON HOLD | OUTPATIENT
Start: 2024-03-05 | End: 2024-05-07 | Stop reason: HOSPADM

## 2024-03-05 NOTE — PROGRESS NOTES
"[facility]    The patient location is:  [place]  The chief complaint leading to consultation is:  Lower extremity edema    Visit type: audiovisual    Face to Face time with patient: 10  20 minutes of total time spent on the encounter, which includes face to face time and non-face to face time preparing to see the patient (eg, review of tests), Obtaining and/or reviewing separately obtained history, Documenting clinical information in the electronic or other health record, Independently interpreting results (not separately reported) and communicating results to the patient/family/caregiver, or Care coordination (not separately reported).     Each patient to whom he or she provides medical services by telemedicine is:  (1) informed of the relationship between the physician and patient and the respective role of any other health care provider with respect to management of the patient; and (2) notified that he or she may decline to receive medical services by telemedicine and may withdraw from such care at any time.      Chief Complaint  Chief Complaint   Patient presents with    Congestive Heart Failure       HPI:    Unable to come to his office appointment today after a recent fall.  Feels weak.  He is having increasing bilateral lower extremity edema.  No dyspnea.  No paroxysmal nocturnal dyspnea or orthopnea.  Has been taking Lasix 80 mg every day.  Good urine output but not catching up.    Medications  Current Outpatient Medications   Medication Sig Dispense Refill    aspirin 81 MG Chew Take 81 mg by mouth.      BD ULTRA-FINE RAHUL PEN NEEDLE 32 gauge x 5/32" Ndle To use 6 daily 400 each 3    carvediloL (COREG) 6.25 MG tablet Take 1 tablet (6.25 mg total) by mouth 2 (two) times daily with meals. 180 tablet 3    digoxin (LANOXIN) 125 mcg tablet TAKE 1 TABLET BY MOUTH ONCE DAILY. 90 tablet 3    diltiaZEM (CARDIZEM CD) 120 MG Cp24 Take 120 mg by mouth Daily.      ergocalciferol (ERGOCALCIFEROL) 50,000 " unit Cap Take 1 capsule (50,000 Units total) by mouth every 7 days. 12 capsule 3    folic acid (FOLVITE) 1 MG tablet Take 1 tablet (1 mg total) by mouth once daily. 30 tablet 5    furosemide (LASIX) 40 MG tablet Take 1 tablet (40 mg total) by mouth daily as needed (fluid). (Patient taking differently: Take 40 mg by mouth daily as needed (fluid). 80mg daily) 90 tablet 3    furosemide (LASIX) 80 MG tablet Take 1 tablet (80 mg total) by mouth once daily. 90 tablet 3    HYDROcodone-acetaminophen (NORCO)  mg per tablet TAKE 1 TAB BY MOUTH EVERY 6 HOURS AS NEEDED FOR PAIN, GREATER THAN 7 DAYS MEDICALLY NECESSARY. STOP TAKING NORCO 5/325 120 tablet 0    HYDROcodone-acetaminophen (NORCO)  mg per tablet Take 1 tablet by mouth every 6 (six) hours as needed. GREATER THAN 7 DAYS MEDICALLY NECESSARY 120 tablet 0    HYDROcodone-acetaminophen (NORCO)  mg per tablet TAKE 1 tablet by mouth every 6 hours as needed for  PAIN, GREATER THAN 7 DAYS MEDICALLY NECESSARY 120 tablet 0    HYDROmorphone (DILAUDID) 4 MG tablet take 1 tablet by mouth every 8 hours as needed for pain.  Only for short term use.  Do not take with Norco 6 tablet 0    HYDROmorphone (DILAUDID) 4 MG tablet TAKE ONE (1) TABLET BY MOUTH EVERY EIGHT(8) HOURS AS NEEDED FOR PAIN. ONLY FOR SHORT TERM USE. - DO NOT TAKE WITH NORCO 12 tablet 0    insulin aspart, niacinamide, (FIASP PENFILL U-100 INSULIN) 100 unit/mL (3 mL) Crtg pen To use with inPen; 140 max daily dose. 14 pen 11    insulin degludec (TRESIBA FLEXTOUCH U-200) 200 unit/mL (3 mL) insulin pen Inject 60 Units into the skin once daily. Cartridge for novoecho 3 pen 11    lamiVUDine (EPIVIR) 150 MG Tab TAKE 1 TABLET BY MOUTH EVERY DAY 30 tablet 23    multivitamin (THERAGRAN) per tablet Take 1 tablet by mouth once daily.      mupirocin (BACTROBAN) 2 % ointment Apply to affected area 3 times daily 22 g 1    nitroGLYCERIN (NITROSTAT) 0.4 MG SL tablet PLACE 1 TABLET UNDER THE TONGUE EVERY 5 MINUTES AS  NEEDED FOR CHEST PAIN. 100 tablet 2    nystatin (MYCOSTATIN) powder Apply topically 4 (four) times daily. Apply to intertriginous areas as directed up to four times daily to reduce moisture. for 14 days 30 g 0    olmesartan (BENICAR) 40 MG tablet TAKE 1 TABLET BY MOUTH EVERY DAY AT NIGHT 90 tablet 3    predniSONE (DELTASONE) 2.5 MG tablet TAKE 5 TABLETS (12.5 MG TOTAL) BY MOUTH ONCE DAILY. 150 tablet 2    predniSONE (DELTASONE) 5 MG tablet Take 2 tablets (10 mg total) by mouth once daily. 60 tablet 3    PROAIR HFA 90 mcg/actuation inhaler       sulfamethoxazole-trimethoprim 800-160mg (BACTRIM DS) 800-160 mg Tab Take 1 tablet by mouth 2 (two) times daily. 14 tablet 0    tirzepatide (MOUNJARO) 5 mg/0.5 mL PnIj Inject 5 mg into the skin every 7 days. 4 pen 0     No current facility-administered medications for this visit.        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/26/2023    forehead    Stroke     TIA     Past Surgical History:   Procedure Laterality Date    CORONARY ANGIOGRAPHY N/A 3/10/2021    Procedure: ANGIOGRAM, CORONARY ARTERY - right radial;  Surgeon: Shemar Dempsey MD;  Location: [facility] CATH LAB;  Service: Cardiology;  Laterality: N/A;    CORONARY STENT PLACEMENT  03/10/2021    prox-mid RCA [place] 4.5 x 26 mm, 4.5 x 12 mm    INCISION AND DRAINAGE OF SCROTUM N/A 11/15/2022    Procedure: INCISION AND DRAINAGE, SCROTUM;  Surgeon: William Hatch MD;  Location: [facility] OR;  Service: Urology;  Laterality: N/A;    INCISION AND DRAINAGE OF SCROTUM N/A 11/17/2022    Procedure: INCISION AND DRAINAGE, SCROTUM;  Surgeon: William Hatch MD;  Location: [facility] OR;  Service: Urology;   Laterality: N/A;    LUNG LOBECTOMY Right 2008    RUL lobectomy after removal of atrial myxoma    PLEURA BIOPSY      RESECTION OF ATRIAL MYXOMA  2007     Social History     Socioeconomic History    Marital status: Single   Occupational History    Occupation: , respiratory therapist, founded [employer]     Comment: Retired   Tobacco Use    Smoking status: Every Day     Current packs/day: 1.00     Average packs/day: 1 pack/day for 35.0 years (35.0 ttl pk-yrs)     Types: Cigarettes    Smokeless tobacco: Never   Substance and Sexual Activity    Alcohol use: Not Currently    Drug use: No    Sexual activity: Never     Social Determinants of Health     Financial Resource Strain: Low Risk  (2/20/2024)    Overall Financial Resource Strain (CARDIA)     Difficulty of Paying Living Expenses: Not very hard   Recent Concern: Financial Resource Strain - Medium Risk (11/24/2023)    Overall Financial Resource Strain (CARDIA)     Difficulty of Paying Living Expenses: Somewhat hard   Food Insecurity: No Food Insecurity (2/20/2024)    Hunger Vital Sign     Worried About Running Out of Food in the Last Year: Never true     Ran Out of Food in the Last Year: Never true   Recent Concern: Food Insecurity - Food Insecurity Present (11/24/2023)    Hunger Vital Sign     Worried About Running Out of Food in the Last Year: Sometimes true     Ran Out of Food in the Last Year: Patient declined   Transportation Needs: No Transportation Needs (2/20/2024)    PRAPARE - Transportation     Lack of Transportation (Medical): No     Lack of Transportation (Non-Medical): No   Recent Concern: Transportation Needs - Unmet Transportation Needs (11/24/2023)    PRAPARE - Transportation     Lack of Transportation (Medical): Yes     Lack of Transportation (Non-Medical): Yes   Physical Activity: Insufficiently Active (2/20/2024)    Exercise Vital Sign     Days of Exercise per Week: 1 day     Minutes of Exercise per Session: 10 min   Stress: No  Stress Concern Present (2/20/2024)    [employer] [place] [employer] - Occupational Stress Questionnaire     Feeling of Stress : Not at all   Recent Concern: Stress - Stress Concern Present (11/24/2023)    [employer] [place] [employer] - Occupational Stress Questionnaire     Feeling of Stress : To some extent   Social Connections: Unknown (11/24/2023)    Social Connection and Isolation Panel [NHANES]     Frequency of Communication with Friends and Family: More than three times a week     Frequency of Social Gatherings with Friends and Family: Once a week     Active Member of Clubs or Organizations: Yes     Attends Club or Organization Meetings: More than 4 times per year     Marital Status:    Housing Stability: Low Risk  (2/20/2024)    Housing Stability Vital Sign     Unable to Pay for Housing in the Last Year: No     Number of Places Lived in the Last Year: 1     Unstable Housing in the Last Year: No   Recent Concern: Housing Stability - High Risk (11/24/2023)    Housing Stability Vital Sign     Unable to Pay for Housing in the Last Year: Yes     Number of Places Lived in the Last Year: 1     Unstable Housing in the Last Year: No     Family History   Problem Relation Age of Onset    Hodgkin's lymphoma Mother     Diabetes type II Mother     Kidney failure Father     Diabetes type I Father     Cancer Sister        Allergies  Review of patient's allergies indicates:   Allergen Reactions    Enbrel [etanercept] Shortness Of Breath     CHF    Nsaids (non-steroidal anti-inflammatory drug) Other (See Comments)     hypertention  Other reaction(s): Other (See Comments)  hypertention  HTN    Statins-hmg-coa reductase inhibitors Other (See Comments)     Heart arrythemias  Other reaction(s): Other (See Comments)  Heart arrythemias  Joint pain and cardiac arrythmias    Pcn [penicillins] Rash       Review of Systems   Review of Systems   Constitutional: Negative for diaphoresis, malaise/fatigue,  weight gain and weight loss.   Eyes:  Negative for visual disturbance.   Cardiovascular:  Positive for leg swelling. Negative for chest pain, claudication, cyanosis, dyspnea on exertion, irregular heartbeat, near-syncope, orthopnea, palpitations, paroxysmal nocturnal dyspnea and syncope.   Respiratory:  Negative for hemoptysis, shortness of breath, sleep disturbances due to breathing and wheezing.    Hematologic/Lymphatic: Negative for bleeding problem. Does not bruise/bleed easily.   Skin:  Negative for poor wound healing.   Musculoskeletal:  Negative for muscle cramps and myalgias.   Gastrointestinal:  Negative for abdominal pain, anorexia, diarrhea, heartburn, hematemesis, hematochezia, melena, nausea and vomiting.   Genitourinary:  Negative for hematuria and nocturia.   Neurological:  Negative for excessive daytime sleepiness, dizziness, focal weakness, light-headedness and weakness.       Physical Exam  Vitals:    03/05/24 1426   BP: 116/83     Wt Readings from Last 1 Encounters:   02/09/24 99.8 kg (220 lb)     Physical Exam    Labs  Lab Visit on 03/01/2024   Component Date Value Ref Range Status    Microalbumin, Urine 03/01/2024 23.0  ug/mL Final    Creatinine, Urine 03/01/2024 120.9  23.0 - 375.0 mg/dL Final    Microalb/Creat Ratio 03/01/2024 19.0  0.0 - 30.0 ug/mg Final    Sodium 03/01/2024 140  136 - 145 mmol/L Final    Potassium 03/01/2024 4.0  3.5 - 5.1 mmol/L Final    Chloride 03/01/2024 100  95 - 110 mmol/L Final    CO2 03/01/2024 32 (H)  23 - 29 mmol/L Final    Glucose 03/01/2024 97  70 - 110 mg/dL Final    BUN 03/01/2024 20  8 - 23 mg/dL Final    Creatinine 03/01/2024 1.0  0.5 - 1.4 mg/dL Final    Calcium 03/01/2024 9.2  8.7 - 10.5 mg/dL Final    Total Protein 03/01/2024 6.3  6.0 - 8.4 g/dL Final    Albumin 03/01/2024 3.4 (L)  3.5 - 5.2 g/dL Final    Total Bilirubin 03/01/2024 0.6  0.1 - 1.0 mg/dL Final    Comment: For infants and newborns, interpretation of results should be based  on gestational  age, weight and in agreement with clinical  observations.    Premature Infant recommended reference ranges:  Up to 24 hours.............<8.0 mg/dL  Up to 48 hours............<12.0 mg/dL  3-5 days..................<15.0 mg/dL  6-29 days.................<15.0 mg/dL      Alkaline Phosphatase 03/01/2024 102  55 - 135 U/L Final    AST 03/01/2024 24  10 - 40 U/L Final    ALT 03/01/2024 52 (H)  10 - 44 U/L Final    eGFR 03/01/2024 >60  >60 mL/min/1.73 m^2 Final    Anion Gap 03/01/2024 8  8 - 16 mmol/L Final    WBC 03/01/2024 8.00  3.90 - 12.70 K/uL Final    RBC 03/01/2024 5.34  4.60 - 6.20 M/uL Final    Hemoglobin 03/01/2024 18.1 (H)  14.0 - 18.0 g/dL Final    Hematocrit 03/01/2024 54.2 (H)  40.0 - 54.0 % Final    MCV 03/01/2024 102 (H)  82 - 98 fL Final    MCH 03/01/2024 33.9 (H)  27.0 - 31.0 pg Final    MCHC 03/01/2024 33.4  32.0 - 36.0 g/dL Final    RDW 03/01/2024 13.5  11.5 - 14.5 % Final    Platelets 03/01/2024 194  150 - 450 K/uL Final    MPV 03/01/2024 10.9  9.2 - 12.9 fL Final    Immature Granulocytes 03/01/2024 1.8 (H)  0.0 - 0.5 % Final    Gran # (ANC) 03/01/2024 6.2  1.8 - 7.7 K/uL Final    Immature Grans (Abs) 03/01/2024 0.14 (H)  0.00 - 0.04 K/uL Final    Comment: Mild elevation in immature granulocytes is non specific and   can be seen in a variety of conditions including stress response,   acute inflammation, trauma and pregnancy. Correlation with other   laboratory and clinical findings is essential.      Lymph # 03/01/2024 0.9 (L)  1.0 - 4.8 K/uL Final    Mono # 03/01/2024 0.7  0.3 - 1.0 K/uL Final    Eos # 03/01/2024 0.0  0.0 - 0.5 K/uL Final    Baso # 03/01/2024 0.03  0.00 - 0.20 K/uL Final    nRBC 03/01/2024 0  0 /100 WBC Final    Gran % 03/01/2024 77.2 (H)  38.0 - 73.0 % Final    Lymph % 03/01/2024 11.3 (L)  18.0 - 48.0 % Final    Mono % 03/01/2024 8.8  4.0 - 15.0 % Final    Eosinophil % 03/01/2024 0.5  0.0 - 8.0 % Final    Basophil % 03/01/2024 0.4  0.0 - 1.9 % Final    Differential Method 03/01/2024  Automated   Final    Hemoglobin A1C 03/01/2024 7.4 (H)  4.0 - 5.6 % Final    Comment: ADA Screening Guidelines:  5.7-6.4%  Consistent with prediabetes  >or=6.5%  Consistent with diabetes    High levels of fetal hemoglobin interfere with the HbA1C  assay. Heterozygous hemoglobin variants (HbS, HgC, etc)do  not significantly interfere with this assay.   However, presence of multiple variants may affect accuracy.      Estimated Avg Glucose 03/01/2024 166 (H)  68 - 131 mg/dL Final    PSA Total 03/01/2024 0.18  0.00 - 4.00 ng/mL Final    Comment: The testing method is a chemiluminescent microparticle immunoassay   manufactured by [employer] and performed on the [employer]   or   Read[employer] system. Values obtained with different assay manufacturers   for   methods may be different and cannot be used interchangeably.  PSA Expected levels:  Hormonal Therapy: <0.05 ng/ml  Prostatectomy: <0.01 ng/ml  Radiation Therapy: <1.00 ng/ml      PSA, Free 03/01/2024 <0.10  0.00 - 1.50 ng/mL Final    Comment: The testing method is a chemiluminescent microparticle immunoassay   manufactured by [employer] and performed on the [employer]   or   [employer] system. Values obtained with different assay manufacturers   for   methods may be different and cannot be used interchangeably.      PSA, Free % 03/01/2024 Unable to calculate  Not established % Final   Admission on 02/09/2024, Discharged on 02/09/2024   Component Date Value Ref Range Status    QRS Duration 02/09/2024 100  ms Final    OHS QTC Calculation 02/09/2024 410  ms Final    QRS Duration 02/09/2024 98  ms Final    [facility]HS QTC Calculation 02/09/2024 415  ms Final    WBC 02/09/2024 9.07  3.90 - 12.70 K/uL Final    RBC 02/09/2024 5.35  4.60 - 6.20 M/uL Final    Hemoglobin 02/09/2024 18.1 (H)  14.0 - 18.0 g/dL Final    Hematocrit 02/09/2024 54.0  40.0 - 54.0 % Final    MCV 02/09/2024 101 (H)  82 - 98 fL Final    MCH 02/09/2024 33.8 (H)  27.0 - 31.0 pg Final    MCHC  02/09/2024 33.5  32.0 - 36.0 g/dL Final    RDW 02/09/2024 13.5  11.5 - 14.5 % Final    Platelets 02/09/2024 188  150 - 450 K/uL Final    MPV 02/09/2024 10.7  9.2 - 12.9 fL Final    Immature Granulocytes 02/09/2024 1.3 (H)  0.0 - 0.5 % Final    Gran # (ANC) 02/09/2024 7.3  1.8 - 7.7 K/uL Final    Immature Grans (Abs) 02/09/2024 0.12 (H)  0.00 - 0.04 K/uL Final    Comment: Mild elevation in immature granulocytes is non specific and   can be seen in a variety of conditions including stress response,   acute inflammation, trauma and pregnancy. Correlation with other   laboratory and clinical findings is essential.      Lymph # 02/09/2024 0.9 (L)  1.0 - 4.8 K/uL Final    Mono # 02/09/2024 0.7  0.3 - 1.0 K/uL Final    Eos # 02/09/2024 0.0  0.0 - 0.5 K/uL Final    Baso # 02/09/2024 0.02  0.00 - 0.20 K/uL Final    nRBC 02/09/2024 0  0 /100 WBC Final    Gran % 02/09/2024 80.4 (H)  38.0 - 73.0 % Final    Lymph % 02/09/2024 10.1 (L)  18.0 - 48.0 % Final    Mono % 02/09/2024 7.7  4.0 - 15.0 % Final    Eosinophil % 02/09/2024 0.3  0.0 - 8.0 % Final    Basophil % 02/09/2024 0.2  0.0 - 1.9 % Final    Differential Method 02/09/2024 Automated   Final    Sodium 02/09/2024 138  136 - 145 mmol/L Final    Potassium 02/09/2024 4.1  3.5 - 5.1 mmol/L Final    Chloride 02/09/2024 98  95 - 110 mmol/L Final    CO2 02/09/2024 27  23 - 29 mmol/L Final    Glucose 02/09/2024 172 (H)  70 - 110 mg/dL Final    BUN 02/09/2024 20  8 - 23 mg/dL Final    Creatinine 02/09/2024 1.1  0.5 - 1.4 mg/dL Final    Calcium 02/09/2024 8.7  8.7 - 10.5 mg/dL Final    Total Protein 02/09/2024 6.6  6.0 - 8.4 g/dL Final    Albumin 02/09/2024 3.6  3.5 - 5.2 g/dL Final    Total Bilirubin 02/09/2024 0.4  0.1 - 1.0 mg/dL Final    Comment: For infants and newborns, interpretation of results should be based  on gestational age, weight and in agreement with clinical  observations.    Premature Infant recommended reference ranges:  Up to 24 hours.............<8.0 mg/dL  Up to  48 hours............<12.0 mg/dL  3-5 days..................<15.0 mg/dL  6-29 days.................<15.0 mg/dL      Alkaline Phosphatase 02/09/2024 70  55 - 135 U/L Final    AST 02/09/2024 26  10 - 40 U/L Final    ALT 02/09/2024 49 (H)  10 - 44 U/L Final    eGFR 02/09/2024 >60  >60 mL/min/1.73 m^2 Final    Anion Gap 02/09/2024 13  8 - 16 mmol/L Final    Troponin I 02/09/2024 0.020  0.000 - 0.026 ng/mL Final    Comment: The reference interval for Troponin I represents the 99th percentile   cutoff   for our facility and is consistent with 3rd generation assay   performance.      BNP 02/09/2024 176 (H)  0 - 99 pg/mL Final    Values of less than 100 pg/ml are consistent with non-CHF populations.    POC Rapid COVID 02/09/2024 Negative  Negative Final     Acceptable 02/09/2024 Yes   Final    POC Molecular Influenza A Ag 02/09/2024 Negative  Negative, Not Reported Final    POC Molecular Influenza B Ag 02/09/2024 Negative  Negative, Not Reported Final     Acceptable 02/09/2024 Yes   Final   Lab Visit on 12/01/2023   Component Date Value Ref Range Status    Cholesterol 12/01/2023 221 (H)  120 - 199 mg/dL Final    Comment: The National Cholesterol Education Program (NCEP) has set the  following guidelines (reference ranges) for Cholesterol:  Optimal.....................<200 mg/dL  Borderline High.............200-239 mg/dL  High........................> or = 240 mg/dL      Triglycerides 12/01/2023 157 (H)  30 - 150 mg/dL Final    Comment: The National Cholesterol Education Program (NCEP) has set the  following guidelines (reference values) for triglycerides:  Normal......................<150 mg/dL  Borderline High.............150-199 mg/dL  High........................200-499 mg/dL      HDL 12/01/2023 61  40 - 75 mg/dL Final    Comment: The National Cholesterol Education Program (NCEP) has set the  following guidelines (reference values) for HDL Cholesterol:  Low...............<40  mg/dL  Optimal...........>60 mg/dL      LDL Cholesterol 12/01/2023 128.6  63.0 - 159.0 mg/dL Final    Comment: The National Cholesterol Education Program (NCEP) has set the  following guidelines (reference values) for LDL Cholesterol:  Optimal.......................<130 mg/dL  Borderline High...............130-159 mg/dL  High..........................160-189 mg/dL  Very High.....................>190 mg/dL      HDL/Cholesterol Ratio 12/01/2023 27.6  20.0 - 50.0 % Final    Total Cholesterol/HDL Ratio 12/01/2023 3.6  2.0 - 5.0 Final    Non-HDL Cholesterol 12/01/2023 160  mg/dL Final    Comment: Risk category and Non-HDL cholesterol goals:  Coronary heart disease (CHD)or equivalent (10-year risk of CHD >20%):  Non-HDL cholesterol goal     <130 mg/dL  Two or more CHD risk factors and 10-year risk of CHD <= 20%:  Non-HDL cholesterol goal     <160 mg/dL  0 to 1 CHD risk factor:  Non-HDL cholesterol goal     <190 mg/dL      Sodium 12/01/2023 141  136 - 145 mmol/L Final    Potassium 12/01/2023 3.8  3.5 - 5.1 mmol/L Final    Specimen slightly hemolyzed    Chloride 12/01/2023 100  95 - 110 mmol/L Final    CO2 12/01/2023 29  23 - 29 mmol/L Final    Glucose 12/01/2023 146 (H)  70 - 110 mg/dL Final    BUN 12/01/2023 28 (H)  8 - 23 mg/dL Final    Creatinine 12/01/2023 1.3  0.5 - 1.4 mg/dL Final    Calcium 12/01/2023 9.3  8.7 - 10.5 mg/dL Final    Total Protein 12/01/2023 7.1  6.0 - 8.4 g/dL Final    Albumin 12/01/2023 3.6  3.5 - 5.2 g/dL Final    Total Bilirubin 12/01/2023 0.4  0.1 - 1.0 mg/dL Final    Comment: For infants and newborns, interpretation of results should be based  on gestational age, weight and in agreement with clinical  observations.    Premature Infant recommended reference ranges:  Up to 24 hours.............<8.0 mg/dL  Up to 48 hours............<12.0 mg/dL  3-5 days..................<15.0 mg/dL  6-29 days.................<15.0 mg/dL      Alkaline Phosphatase 12/01/2023 62  55 - 135 U/L Final    AST 12/01/2023  28  10 - 40 U/L Final    ALT 12/01/2023 49 (H)  10 - 44 U/L Final    eGFR 12/01/2023 59 (A)  >60 mL/min/1.73 m^2 Final    Anion Gap 12/01/2023 12  8 - 16 mmol/L Final    Magnesium 12/01/2023 2.3  1.6 - 2.6 mg/dL Final    Hemoglobin A1C 12/01/2023 7.0 (H)  4.0 - 5.6 % Final    Comment: ADA Screening Guidelines:  5.7-6.4%  Consistent with prediabetes  >or=6.5%  Consistent with diabetes    High levels of fetal hemoglobin interfere with the HbA1C  assay. Heterozygous hemoglobin variants (HbS, HgC, etc)do  not significantly interfere with this assay.   However, presence of multiple variants may affect accuracy.      Estimated Avg Glucose 12/01/2023 154 (H)  68 - 131 mg/dL Final    Sodium 12/01/2023 141  136 - 145 mmol/L Final    Potassium 12/01/2023 3.8  3.5 - 5.1 mmol/L Final    Specimen slightly hemolyzed    Chloride 12/01/2023 100  95 - 110 mmol/L Final    CO2 12/01/2023 29  23 - 29 mmol/L Final    Glucose 12/01/2023 146 (H)  70 - 110 mg/dL Final    BUN 12/01/2023 28 (H)  8 - 23 mg/dL Final    Creatinine 12/01/2023 1.3  0.5 - 1.4 mg/dL Final    Calcium 12/01/2023 9.3  8.7 - 10.5 mg/dL Final    Total Protein 12/01/2023 7.1  6.0 - 8.4 g/dL Final    Albumin 12/01/2023 3.6  3.5 - 5.2 g/dL Final    Total Bilirubin 12/01/2023 0.4  0.1 - 1.0 mg/dL Final    Comment: For infants and newborns, interpretation of results should be based  on gestational age, weight and in agreement with clinical  observations.    Premature Infant recommended reference ranges:  Up to 24 hours.............<8.0 mg/dL  Up to 48 hours............<12.0 mg/dL  3-5 days..................<15.0 mg/dL  6-29 days.................<15.0 mg/dL      Alkaline Phosphatase 12/01/2023 62  55 - 135 U/L Final    AST 12/01/2023 28  10 - 40 U/L Final    ALT 12/01/2023 49 (H)  10 - 44 U/L Final    eGFR 12/01/2023 59 (A)  >60 mL/min/1.73 m^2 Final    Anion Gap 12/01/2023 12  8 - 16 mmol/L Final   Office Visit on 10/03/2023   Component Date Value Ref Range Status    Final  Pathologic Diagnosis 10/03/2023    Final                    Value:[facility] DIAGNOSIS:    SKIN, FOREHEAD, SHAVE BIOPSY:  - Invasive squamous cell carcinoma, moderately differentiated, transected.    YENI MARIO M.D.        Report attached.    Performing site:  [facility]  [address] [place] [address]    &quot;Disclaimer:  This case diagnosis was rendered completely by the outside consultation pathologist and the case is electronically signed by an [facility] pathologist listed below solely to release the report into the medical record.&quot;      Interp By Rober Valdez MD, PhD, Signed on 10/10/2023 at 13:42    Disclaimer 10/03/2023 Unless the case is a 'gross only' or additional testing only, the final diagnosis for each specimen is based on a microscopic examination of appropriate tissue sections.   Final   Lab Visit on 09/29/2023   Component Date Value Ref Range Status    Sodium 09/29/2023 139  136 - 145 mmol/L Final    Potassium 09/29/2023 4.0  3.5 - 5.1 mmol/L Final    Chloride 09/29/2023 102  95 - 110 mmol/L Final    CO2 09/29/2023 25  23 - 29 mmol/L Final    Glucose 09/29/2023 98  70 - 110 mg/dL Final    BUN 09/29/2023 19  8 - 23 mg/dL Final    Creatinine 09/29/2023 1.0  0.5 - 1.4 mg/dL Final    Calcium 09/29/2023 9.3  8.7 - 10.5 mg/dL Final    Total Protein 09/29/2023 7.3  6.0 - 8.4 g/dL Final    Albumin 09/29/2023 3.8  3.5 - 5.2 g/dL Final    Total Bilirubin 09/29/2023 0.5  0.1 - 1.0 mg/dL Final    Comment: For infants and newborns, interpretation of results should be based  on gestational age, weight and in agreement with clinical  observations.    Premature Infant recommended reference ranges:  Up to 24 hours.............<8.0 mg/dL  Up to 48 hours............<12.0 mg/dL  3-5 days..................<15.0 mg/dL  6-29 days.................<15.0 mg/dL      Alkaline Phosphatase 09/29/2023 76  55 - 135 U/L Final    AST 09/29/2023 39  10 - 40 U/L Final    ALT 09/29/2023 89 (H)  10 - 44 U/L  Final    eGFR 09/29/2023 >60  >60 mL/min/1.73 m^2 Final    Anion Gap 09/29/2023 12  8 - 16 mmol/L Final    Hemoglobin A1C 09/29/2023 7.1 (H)  4.0 - 5.6 % Final    Comment: ADA Screening Guidelines:  5.7-6.4%  Consistent with prediabetes  >or=6.5%  Consistent with diabetes    High levels of fetal hemoglobin interfere with the HbA1C  assay. Heterozygous hemoglobin variants (HbS, HgC, etc)do  not significantly interfere with this assay.   However, presence of multiple variants may affect accuracy.      Estimated Avg Glucose 09/29/2023 157 (H)  68 - 131 mg/dL Final    Vit D, 25-Hydroxy 09/29/2023 30  30 - 96 ng/mL Final    Comment: Vitamin D deficiency.........<10 ng/mL                              Vitamin D insufficiency......10-29 ng/mL       Vitamin D sufficiency........> or equal to 30 ng/mL  Vitamin D toxicity............>100 ng/mL      PSA, Screen 09/29/2023 0.23  0.00 - 4.00 ng/mL Final    Comment: The testing method is a chemiluminescent microparticle immunoassay   manufactured by [employer] and performed on the [employer]   or   [employer] system. Values obtained with different assay manufacturers   for   methods may be different and cannot be used interchangeably.  PSA Expected levels:  Hormonal Therapy: <0.05 ng/ml  Prostatectomy: <0.01 ng/ml  Radiation Therapy: <1.00 ng/ml      Iron 09/29/2023 149  45 - 160 ug/dL Final    Transferrin 09/29/2023 263  200 - 375 mg/dL Final    TIBC 09/29/2023 389  250 - 450 ug/dL Final    Saturated Iron 09/29/2023 38  20 - 50 % Final   Admission on 09/21/2023, Discharged on 09/21/2023   Component Date Value Ref Range Status    POCT Glucose 09/21/2023 187 (H)  70 - 110 mg/dL Final    WBC 09/21/2023 8.06  3.90 - 12.70 K/uL Final    RBC 09/21/2023 5.16  4.60 - 6.20 M/uL Final    Hemoglobin 09/21/2023 16.8  14.0 - 18.0 g/dL Final    Hematocrit 09/21/2023 50.2  40.0 - 54.0 % Final    MCV 09/21/2023 97  82 - 98 fL Final    MCH 09/21/2023 32.6 (H)  27.0 - 31.0 pg Final    MCHC  09/21/2023 33.5  32.0 - 36.0 g/dL Final    RDW 09/21/2023 13.3  11.5 - 14.5 % Final    Platelets 09/21/2023 176  150 - 450 K/uL Final    MPV 09/21/2023 10.9  9.2 - 12.9 fL Final    Immature Granulocytes 09/21/2023 1.4 (H)  0.0 - 0.5 % Final    Gran # (ANC) 09/21/2023 5.7  1.8 - 7.7 K/uL Final    Immature Grans (Abs) 09/21/2023 0.11 (H)  0.00 - 0.04 K/uL Final    Comment: Mild elevation in immature granulocytes is non specific and   can be seen in a variety of conditions including stress response,   acute inflammation, trauma and pregnancy. Correlation with other   laboratory and clinical findings is essential.      Lymph # 09/21/2023 1.5  1.0 - 4.8 K/uL Final    Mono # 09/21/2023 0.6  0.3 - 1.0 K/uL Final    Eos # 09/21/2023 0.1  0.0 - 0.5 K/uL Final    Baso # 09/21/2023 0.03  0.00 - 0.20 K/uL Final    nRBC 09/21/2023 0  0 /100 WBC Final    Gran % 09/21/2023 71.2  38.0 - 73.0 % Final    Lymph % 09/21/2023 18.5  18.0 - 48.0 % Final    Mono % 09/21/2023 7.4  4.0 - 15.0 % Final    Eosinophil % 09/21/2023 1.1  0.0 - 8.0 % Final    Basophil % 09/21/2023 0.4  0.0 - 1.9 % Final    Differential Method 09/21/2023 Automated   Final    Sodium 09/21/2023 137  136 - 145 mmol/L Final    Potassium 09/21/2023 4.0  3.5 - 5.1 mmol/L Final    Specimen slightly hemolyzed    Chloride 09/21/2023 102  95 - 110 mmol/L Final    CO2 09/21/2023 25  23 - 29 mmol/L Final    Glucose 09/21/2023 204 (H)  70 - 110 mg/dL Final    BUN 09/21/2023 28 (H)  8 - 23 mg/dL Final    Creatinine 09/21/2023 1.1  0.5 - 1.4 mg/dL Final    Calcium 09/21/2023 9.6  8.7 - 10.5 mg/dL Final    Total Protein 09/21/2023 6.6  6.0 - 8.4 g/dL Final    Albumin 09/21/2023 3.6  3.5 - 5.2 g/dL Final    Total Bilirubin 09/21/2023 0.4  0.1 - 1.0 mg/dL Final    Comment: For infants and newborns, interpretation of results should be based  on gestational age, weight and in agreement with clinical  observations.    Premature Infant recommended reference ranges:  Up to 24  hours.............<8.0 mg/dL  Up to 48 hours............<12.0 mg/dL  3-5 days..................<15.0 mg/dL  6-29 days.................<15.0 mg/dL      Alkaline Phosphatase 09/21/2023 75  55 - 135 U/L Final    AST 09/21/2023 25  10 - 40 U/L Final    ALT 09/21/2023 51 (H)  10 - 44 U/L Final    eGFR 09/21/2023 >60  >60 mL/min/1.73 m^2 Final    Anion Gap 09/21/2023 10  8 - 16 mmol/L Final    POC Rapid COVID 09/21/2023 Negative  Negative Final     Acceptable 09/21/2023 Yes   Final       Imaging  X-Ray Lumbar Spine AP And Lateral    Result Date: 3/1/2024  EXAMINATION: XR LUMBAR SPINE AP AND LATERAL CLINICAL HISTORY: Vertebrogenic low back pain FINDINGS: LUMBAR SPINE TWO VIEWS: THERE IS A T11 COMPRESSION FRACTURE THAT MAY HAVE PROGRESSED WHEN COMPARED TO THE PRIOR STUDY.  THERE IS BASELINE DJD AND DISH.     T11 COMPRESSION FRACTURE MAY HAVE PROGRESSED FROM THE PRIOR STUDY. Electronically signed by: Jace Ching MD Date:    03/01/2024 Time:    13:03    X-Ray Thoracic Spine AP Lateral    Result Date: 3/1/2024  EXAMINATION: XR THORACIC SPINE AP LATERAL CLINICAL HISTORY: Vertebrogenic low back pain FINDINGS: Thoracic spine two views: There is mild DJD.  There is dish.  There is a T11 compression deformity which was seen on the CT study dated 11/15/2022.  This may have progressed when compared to the prior study dated 11/15/2022.     T11 compression deformity may have progressed from the prior CT. DJD and dish. Electronically signed by: Jace Ching MD Date:    03/01/2024 Time:    13:02    X-Ray Chest AP Portable    Result Date: 2/9/2024  EXAMINATION: XR CHEST AP PORTABLE CLINICAL HISTORY: Chest Pain; TECHNIQUE: Single frontal view of the chest was performed. COMPARISON: 12/24/2022, 08/18/2021 FINDINGS: The cardiac silhouette is midline.  The pulmonary vascularity is normal. Patchy area of increased attenuation right mid and right lower lung zone similar to prior several studies suggestive of parenchymal and  pleural scarring.  Not significantly changed from the prior study.  No pleural effusion.  No pneumothorax.  The osseous structures appear normal.     Chronic architectural distortion right hemithorax, superimpose minimal inflammatory/infectious process not excluded. Electronically signed by: Chantal Hodgson MD Date:    02/09/2024 Time:    16:53      Assessment  1. Acute on chronic diastolic heart failure  Seems mildly decompensated    2. Supraventricular tachycardia  Controlled on current regimen    3. Atherosclerosis of native coronary artery of native heart without angina pectoris  Stable and free of angina    4. Primary hypertension  Controlled      Plan and Discussion    Increase Lasix to 80 mg twice daily until he is better compensated.    Follow Up  Follow up in about 3 months (around 6/5/2024).      Shemar Dempsey MD

## 2024-03-06 ENCOUNTER — TELEPHONE (OUTPATIENT)
Dept: OPHTHALMOLOGY | Facility: CLINIC | Age: 69
End: 2024-03-06
Payer: MEDICARE

## 2024-03-06 ENCOUNTER — CLINICAL SUPPORT (OUTPATIENT)
Dept: DIABETES | Facility: CLINIC | Age: 69
End: 2024-03-06
Payer: MEDICARE

## 2024-03-06 DIAGNOSIS — E11.65 TYPE 2 DIABETES MELLITUS WITH HYPERGLYCEMIA, WITH LONG-TERM CURRENT USE OF INSULIN: ICD-10-CM

## 2024-03-06 DIAGNOSIS — Z79.4 TYPE 2 DIABETES MELLITUS WITH HYPERGLYCEMIA, WITH LONG-TERM CURRENT USE OF INSULIN: Primary | ICD-10-CM

## 2024-03-06 DIAGNOSIS — E11.65 TYPE 2 DIABETES MELLITUS WITH HYPERGLYCEMIA, WITH LONG-TERM CURRENT USE OF INSULIN: Primary | ICD-10-CM

## 2024-03-06 DIAGNOSIS — H25.11 NUCLEAR SCLEROTIC CATARACT OF RIGHT EYE: Primary | ICD-10-CM

## 2024-03-06 DIAGNOSIS — Z79.4 TYPE 2 DIABETES MELLITUS WITH HYPERGLYCEMIA, WITH LONG-TERM CURRENT USE OF INSULIN: ICD-10-CM

## 2024-03-06 PROCEDURE — G0108 DIAB MANAGE TRN  PER INDIV: HCPCS | Mod: 95,,,

## 2024-03-06 RX ORDER — TIRZEPATIDE 5 MG/.5ML
5 INJECTION, SOLUTION SUBCUTANEOUS
Qty: 4 PEN | Refills: 0 | Status: SHIPPED | OUTPATIENT
Start: 2024-03-06 | End: 2024-03-07 | Stop reason: DRUGHIGH

## 2024-03-07 ENCOUNTER — PATIENT MESSAGE (OUTPATIENT)
Dept: RHEUMATOLOGY | Facility: CLINIC | Age: 69
End: 2024-03-07

## 2024-03-07 ENCOUNTER — OFFICE VISIT (OUTPATIENT)
Dept: RHEUMATOLOGY | Facility: CLINIC | Age: 69
End: 2024-03-07
Payer: MEDICARE

## 2024-03-07 ENCOUNTER — PATIENT MESSAGE (OUTPATIENT)
Dept: ENDOCRINOLOGY | Facility: CLINIC | Age: 69
End: 2024-03-07
Payer: MEDICARE

## 2024-03-07 DIAGNOSIS — M06.9 RHEUMATOID ARTHRITIS FLARE: Primary | ICD-10-CM

## 2024-03-07 DIAGNOSIS — Z79.4 TYPE 2 DIABETES MELLITUS WITH HYPERGLYCEMIA, WITH LONG-TERM CURRENT USE OF INSULIN: Primary | ICD-10-CM

## 2024-03-07 DIAGNOSIS — E11.65 TYPE 2 DIABETES MELLITUS WITH HYPERGLYCEMIA, WITH LONG-TERM CURRENT USE OF INSULIN: Primary | ICD-10-CM

## 2024-03-07 PROCEDURE — 3061F NEG MICROALBUMINURIA REV: CPT | Mod: CPTII,95,, | Performed by: INTERNAL MEDICINE

## 2024-03-07 PROCEDURE — 99215 OFFICE O/P EST HI 40 MIN: CPT | Mod: 95,,, | Performed by: INTERNAL MEDICINE

## 2024-03-07 PROCEDURE — 3066F NEPHROPATHY DOC TX: CPT | Mod: CPTII,95,, | Performed by: INTERNAL MEDICINE

## 2024-03-07 PROCEDURE — 3051F HG A1C>EQUAL 7.0%<8.0%: CPT | Mod: CPTII,95,, | Performed by: INTERNAL MEDICINE

## 2024-03-07 NOTE — PROGRESS NOTES
Answers submitted by the patient for this visit:  Rheumatology Questionnaire (Submitted on 3/7/2024)  fever: No  eye redness: No  mouth sores: No  headaches: No  shortness of breath: Yes  chest pain: Yes  trouble swallowing: No  diarrhea: No  constipation: No  unexpected weight change: No  genital sore: No  During the last 3 days, have you had a skin rash?: No  Bruises or bleeds easily: Yes  cough: No        3/7/2024     2:31 PM   Rapid3 Question Responses and Scores   MDHAQ Score 2.5   Psychologic Score 4.4   Pain Score 8   When you awakened in the morning OVER THE LAST WEEK, did you feel stiff? Yes   If Yes, please indicate the number of hours until you are as limber as you will be for the day 0   Fatigue Score 8   Global Health Score 7.5   RAPID3 Score 7.94

## 2024-03-07 NOTE — PROGRESS NOTES
Diabetes Care Specialist Virtual Visit Note     The patient location is: [place]  The chief complaint leading to consultation is: Diabetes  Visit type: audiovisual  Total time spent with patient: 30 min   Each patient to whom he or she provides medical services by telemedicine is:  (1) informed of the relationship between the physician and patient and the respective role of any other health care provider with respect to management of the patient; and (2) notified that he or she may decline to receive medical services by telemedicine and may withdraw from such care at any time.    Diabetes Care Specialist Progress Note  Author: Lalitha Stroud RN, CDE  Date: 3/7/2024    Program Intake  Reason for Diabetes Program Visit:: Intervention  Type of Intervention:: Individual  Individual: Education  Education: Self-Management Skill Review, Nutrition and Meal Planning, Pattern Management  Current diabetes risk level:: low    Lab Results   Component Value Date    HGBA1C 7.4 (H) 03/01/2024     Assessment Summary and Plan    Based on today's diabetes care assessment, the following areas of need were identified:          10/11/2023    12:01 AM   Social   Support No   Access to Mass Media/Tech No   Cognitive/Behavioral Health No   Culture/Orthodoxy No   Communication No   Health Literacy No          3/22/2023    12:02 AM   Clinical   Nutritional Status No          12/6/2023    12:01 AM   Diabetes Self-Management Skills   Medication No   Home Blood Glucose Monitoring No      Today's interventions were provided through individual discussion, instruction, and written materials were provided.      Patient verbalized understanding of instruction and written materials.  Pt was able to return back demonstration of instructions today. Patient understood key points, needs reinforcement and further instruction.     Diabetes Self-Management Care Plan:    Today's Diabetes Self-Management Care Plan was developed with Nithin's input. Nithin has  agreed to work toward the following goal(s) to improve his/her overall diabetes control.      Care Plan: Diabetes Management   Updates made since 2/6/2024 12:00 AM        Problem: Medications         Goal: Pt will change ISF to 15 per Izabel Scobel instructions, and reduce I:C to 8 (vs 5).    Start Date: 2/7/2024   Expected End Date: 3/9/2024   This Visit's Progress: Met   Priority: High   Barriers: No Barriers Identified   Note:    Discussed setting changes instructed by Izabel to inPen settings. Doing well w/ 5mg Mounjaro and seeing reduced insulin needs. Feels ok with increasing to 7.5mg. Will request from provider.        Task: Reviewed with patient all current diabetes medications and provided basic review of the purpose, dosage, frequency, side effects, and storage of both oral and injectable diabetes medications. Completed 2/8/2024        Task: Reviewed possible resources for acquiring cost prohibitive medication. Completed 2/8/2024        Task: Discussed guidelines for preventing, detecting and treating hypoglycemia and hyperglycemia and reviewed the importance of meal and medication timing with diabetes mediations for prevention of hypoglycemia and maximum drug benefit. Completed 2/8/2024        Goal: Pt will increse Mounjaro to 7.5 mg.    Start Date: 3/6/2024   Expected End Date: 4/6/2024   Priority: High   Barriers: No Barriers Identified        Task: Reviewed with patient all current diabetes medications and provided basic review of the purpose, dosage, frequency, side effects, and storage of both oral and injectable diabetes medications. Completed 3/7/2024        Task: Discussed guidelines for preventing, detecting and treating hypoglycemia and hyperglycemia and reviewed the importance of meal and medication timing with diabetes mediations for prevention of hypoglycemia and maximum drug benefit. Completed 3/7/2024        Follow Up Plan     Follow up in about 4 weeks (around 4/3/2024) for review of  inPen and dexcom reports. .    Today's care plan and follow up schedule was discussed with patient.  Nithin verbalized understanding of the care plan, goals, and agrees to follow up plan.        The patient was encouraged to communicate with his/her health care provider/physician and care team regarding his/her condition(s) and treatment.  I provided the patient with my contact information today and encouraged to contact me via phone or [facility] Patient Portal as needed.     Length of Visit   Total Time: 30 Minutes

## 2024-03-07 NOTE — PROGRESS NOTES
Subjective:       Patient ID: Nithin Wagner is a 64 y.o. male.    Chief Complaint: No chief complaint on file.    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   He was 53 when he was diagnosed with RA. He reports he reports he was having pain and swelling in knees and ankles. He then he had involvement in hands and shoulders. He was put on MTX.  He reports that the methotrexate did help with joints but it caused confusion so he stopped it for 2 years. He was taken aleve which helped his pain for 2 years.Then he developed HTN on NSAIDS so he stopped them.  He was then put on hydrocodone 5 QID to control his pain. Reports that warm weather improves his pain. He is back on methotrexate. Today, it will be his 5th dose. He is taking folic acid 1 mg po qday.  He has never tried up to 8 pills once a week. He is taking prednisone 10mg po BID for 4 weeks. He still has some pain in ankles and knees.  He does get mild swelling in knees and ankles. He was previously on prednisone 40mg a day. He smokes 1 pack per day for 40 years    family hx:cousin: connective tissue disease    Interval history: He was diagnosed with T11 COMPRESSION FRACTURE MAY HAVE PROGRESSED FROM THE PRIOR STUDY.    He was diagnosed with squamous cell carcinoma.    He reports extreme fatigue when trying to wean off prednisone.    Past Medical History:   Diagnosis Date    Arthritis     Diabetes mellitus     Hyperlipidemia     Hypertension        Review of Systems      Review of Systems   Constitutional: Negative for fever.   Eyes: Negative for redness.   Respiratory: Negative for cough, hemoptysis, sputum production and shortness of breath.    Cardiovascular: Negative for chest pain.   Gastrointestinal: Negative for constipation and diarrhea.   Musculoskeletal: Positive for joint pain.   Skin: Negative for  rash.   Neurological: Negative for headaches.   Endo/Heme/Allergies: Does not bruise/bleed easily.         Objective:   There were no vitals taken for this visit.     Physical Exam   Constitutional: He is oriented to person, place, and time and well-developed, well-nourished, and in no distress. No distress.   HENT:   Head: Normocephalic and atraumatic.   Right Ear: External ear normal.   Eyes: Conjunctivae and EOM are normal. Pupils are equal, round, and reactive to light. Right eye exhibits no discharge. Left eye exhibits no discharge. No scleral icterus.   Neck: Normal range of motion. Neck supple. No JVD present. No tracheal deviation present. No thyromegaly present.   Cardiovascular: Normal rate and regular rhythm.    Pulmonary/Chest: Effort normal and breath sounds normal. No stridor. No respiratory distress. He has no wheezes. He has no rales. He exhibits no tenderness.   Abdominal: Soft. Bowel sounds are normal. He exhibits no distension and no mass. There is no tenderness. There is no rebound and no guarding.   Lymphadenopathy:     He has no cervical adenopathy.   Neurological: He is alert and oriented to person, place, and time. He displays normal reflexes. No cranial nerve deficit. He exhibits normal muscle tone. Coordination normal.   Skin: Skin is warm and dry. No rash noted. He is not diaphoretic. No erythema. No pallor.     Musculoskeletal: He exhibits edema and tenderness. He exhibits no deformity.      labs: reviewed  Results for CECILIA MEAD (MRN [id number]) as of 4/14/2020 12:44   Ref. Range 4/7/2020 13:52   CCP Antibodies Latest Ref Range: <5.0 U/mL 336.9 (H)   Rheumatoid Factor Latest Ref Range: 0.0 - 15.0 IU/mL 456.0 (H)       No data to display     Assessment:   68  year old with PMH of  Type II DM, HTN, gout, right atrial myxoma s/p surgery in 2018,  CAD s/p 3 stents, PE, right upper lobectomy secondary to right atrial myxoma, latent TB s/p INH x6 in 1982,Polycythemia, MARI    here  For follow  up of  rheumatoid arthritis. He was requiring high doses of steroid to function when we initially met.    Given that he has +hep B core antibody, he was started on lamivudine by hepatology but he had to stop due to GI upset.  I took him off  methotrexate give his baseline thrombocytopenia, mild LFT elevation, and prior lung surgery. MTX made him confused.  He was doing good on enbrel but developed CHF.      He is not able to function with less than prednisone 17.5mg a day. I TOLD HIM  I am concerned about long term side effects of steroids.  He tried  Orencia shots without improvement.  I was going to switch him to orencia infusions but insurance company did not pay for it. 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 His last shot of Kevzara was in September. He does not think Kevzara helped him off the steroids.  He underwent emergent excision and debridement of necrotic tissue on 11/15. Repeat washout performed 11/17/22.  He was then discharged to [facility].  Plan was to start him on Orencia infusions but he would like to hold off on biologics due to recent recurrent infections.   I discussed trial of SSZ or Arava but he prefers to wait.  I told him if he continues on this path with high doses of steroids, he will have serious consequences like deadly infection, osteonecrosis, GI bleed, osteoporosis among others.  He was diagnosed with T11 COMPRESSION FRACTURE MAY HAVE PROGRESSED FROM THE PRIOR STUDY.    He was diagnosed with squamous cell carcinoma.     Plan:       Problem List Items Addressed This Visit       None        Start Actemra 162 mg every 2 weeks with close monitoring and then transition to  once a week  Wean prednisone by 2.5 mg per week  No MTX given MARI and baseline thrombocytopenia, poor lung reserve  Continue folic acid 1 mg po qday   -continue  lamivudine for Hep B core antibody  Follow up with GI  No anti-tnf given CHF  No rosales kinase given history  of PE  Follow up in 12 weeks  Labs 4 weeks after she starts Actemra    The patient location is: home  The chief complaint leading to consultation is: joint pain    Visit type: audiovisual    Face to Face time with patient: 40   minutes of total time spent on the encounter, which includes face to face time and non-face to face time preparing to see the patient (eg, review of tests), Obtaining and/or reviewing separately obtained history, Documenting clinical information in the electronic or other health record, Independently interpreting results (not separately reported) and communicating results to the patient/family/caregiver, or Care coordination (not separately reported).         Each patient to whom he or she provides medical services by telemedicine is:  (1) informed of the relationship between the physician and patient and the respective role of any other health care provider with respect to management of the patient; and (2) notified that he or she may decline to receive medical services by telemedicine and may withdraw from such care at any time.    Notes:

## 2024-03-12 ENCOUNTER — PATIENT MESSAGE (OUTPATIENT)
Dept: DIABETES | Facility: CLINIC | Age: 69
End: 2024-03-12
Payer: MEDICARE

## 2024-03-12 ENCOUNTER — PATIENT MESSAGE (OUTPATIENT)
Dept: RHEUMATOLOGY | Facility: CLINIC | Age: 69
End: 2024-03-12
Payer: MEDICARE

## 2024-03-12 DIAGNOSIS — M06.9 RHEUMATOID ARTHRITIS FLARE: Primary | ICD-10-CM

## 2024-03-12 RX ORDER — PREDNISONE 2.5 MG/1
TABLET ORAL
Qty: 150 TABLET | Refills: 2 | Status: ON HOLD | OUTPATIENT
Start: 2024-03-12 | End: 2024-05-07 | Stop reason: HOSPADM

## 2024-03-14 ENCOUNTER — OFFICE VISIT (OUTPATIENT)
Dept: SPINE | Facility: CLINIC | Age: 69
End: 2024-03-14
Payer: MEDICARE

## 2024-03-14 ENCOUNTER — LAB VISIT (OUTPATIENT)
Dept: LAB | Facility: OTHER | Age: 69
End: 2024-03-14
Attending: INTERNAL MEDICINE
Payer: MEDICARE

## 2024-03-14 VITALS
RESPIRATION RATE: 12 BRPM | HEIGHT: 72 IN | DIASTOLIC BLOOD PRESSURE: 60 MMHG | SYSTOLIC BLOOD PRESSURE: 113 MMHG | BODY MASS INDEX: 29.8 KG/M2 | HEART RATE: 91 BPM | WEIGHT: 220 LBS | OXYGEN SATURATION: 100 %

## 2024-03-14 DIAGNOSIS — S22.080G CLOSED WEDGE COMPRESSION FRACTURE OF T11 VERTEBRA WITH DELAYED HEALING, SUBSEQUENT ENCOUNTER: ICD-10-CM

## 2024-03-14 DIAGNOSIS — S22.080A CLOSED WEDGE COMPRESSION FRACTURE OF T11 VERTEBRA, INITIAL ENCOUNTER: ICD-10-CM

## 2024-03-14 DIAGNOSIS — M54.6 PAIN IN THORACIC SPINE: ICD-10-CM

## 2024-03-14 DIAGNOSIS — M54.16 LUMBAR RADICULOPATHY: Primary | ICD-10-CM

## 2024-03-14 PROCEDURE — 3061F NEG MICROALBUMINURIA REV: CPT | Mod: CPTII,S$GLB,, | Performed by: STUDENT IN AN ORGANIZED HEALTH CARE EDUCATION/TRAINING PROGRAM

## 2024-03-14 PROCEDURE — 86480 TB TEST CELL IMMUN MEASURE: CPT | Performed by: INTERNAL MEDICINE

## 2024-03-14 PROCEDURE — 3078F DIAST BP <80 MM HG: CPT | Mod: CPTII,S$GLB,, | Performed by: STUDENT IN AN ORGANIZED HEALTH CARE EDUCATION/TRAINING PROGRAM

## 2024-03-14 PROCEDURE — 3066F NEPHROPATHY DOC TX: CPT | Mod: CPTII,S$GLB,, | Performed by: STUDENT IN AN ORGANIZED HEALTH CARE EDUCATION/TRAINING PROGRAM

## 2024-03-14 PROCEDURE — 99204 OFFICE O/P NEW MOD 45 MIN: CPT | Mod: S$GLB,,, | Performed by: STUDENT IN AN ORGANIZED HEALTH CARE EDUCATION/TRAINING PROGRAM

## 2024-03-14 PROCEDURE — 1100F PTFALLS ASSESS-DOCD GE2>/YR: CPT | Mod: CPTII,S$GLB,, | Performed by: STUDENT IN AN ORGANIZED HEALTH CARE EDUCATION/TRAINING PROGRAM

## 2024-03-14 PROCEDURE — 1160F RVW MEDS BY RX/DR IN RCRD: CPT | Mod: CPTII,S$GLB,, | Performed by: STUDENT IN AN ORGANIZED HEALTH CARE EDUCATION/TRAINING PROGRAM

## 2024-03-14 PROCEDURE — 1125F AMNT PAIN NOTED PAIN PRSNT: CPT | Mod: CPTII,S$GLB,, | Performed by: STUDENT IN AN ORGANIZED HEALTH CARE EDUCATION/TRAINING PROGRAM

## 2024-03-14 PROCEDURE — 1159F MED LIST DOCD IN RCRD: CPT | Mod: CPTII,S$GLB,, | Performed by: STUDENT IN AN ORGANIZED HEALTH CARE EDUCATION/TRAINING PROGRAM

## 2024-03-14 PROCEDURE — 3288F FALL RISK ASSESSMENT DOCD: CPT | Mod: CPTII,S$GLB,, | Performed by: STUDENT IN AN ORGANIZED HEALTH CARE EDUCATION/TRAINING PROGRAM

## 2024-03-14 PROCEDURE — 3008F BODY MASS INDEX DOCD: CPT | Mod: CPTII,S$GLB,, | Performed by: STUDENT IN AN ORGANIZED HEALTH CARE EDUCATION/TRAINING PROGRAM

## 2024-03-14 PROCEDURE — 3074F SYST BP LT 130 MM HG: CPT | Mod: CPTII,S$GLB,, | Performed by: STUDENT IN AN ORGANIZED HEALTH CARE EDUCATION/TRAINING PROGRAM

## 2024-03-14 PROCEDURE — 99999 PR PBB SHADOW E&M-EST. PATIENT-LVL V: CPT | Mod: PBBFAC,,, | Performed by: STUDENT IN AN ORGANIZED HEALTH CARE EDUCATION/TRAINING PROGRAM

## 2024-03-14 PROCEDURE — 3051F HG A1C>EQUAL 7.0%<8.0%: CPT | Mod: CPTII,S$GLB,, | Performed by: STUDENT IN AN ORGANIZED HEALTH CARE EDUCATION/TRAINING PROGRAM

## 2024-03-14 NOTE — PROGRESS NOTES
Chronic Pain - New Consult    Referring Physician: Beata Nagel    Chief Complaint:   Chief Complaint   Patient presents with    Back Pain          SUBJECTIVE:    Nithin Wagner presents to the clinic for the evaluation of mid back, low back, and bilateral legs pain. The pain started 5 weeks ago following holding objects and adjusting to catch one from slipping and symptoms have been worsening.  He reports that he went to urgent care at that time and was given flexeril with no improvement. The pain is located in the mid back, low back area and radiates to the bilateral shooting leg pain. The thoracic pain is constant and continues to bother him.  He feels he is able to control the sciatica pain with oral medications.  The pain is described as sharp and shooting and is rated as 7/10. The pain is rated with a score of  4/10 on the BEST day and a score of 10/10 on the WORST day.  Symptoms interfere with daily activity and sleeping. The pain is exacerbated by moving around, trying to stand up, taking more than a few steps.  The pain is mitigated by laying down and medications. The patient reports 1.5 hours of uninterrupted sleep per night.    Patient denies urinary incontinence and bowel incontinence. He admits to weakness in his legs. He reports if he bends his left knee, he will collapse.  He was recently diagnosed with osteoporosis. He has a history of rheumatoid arthritis, 3 stents, PE, right atrial myxoma, polycythemia. He has been on high doses of prednisone and has been trying to wean off of this, but his arthritic pain gets worse.    Physical Therapy/Home Exercise: no      Pain Disability Index Review:      3/14/2024    12:55 PM   Last 3 PDI Scores   Pain Disability Index (PDI) 40       Pain Medications:   - Norco 10-325mg (takes after 4-5 hours)   - Dilaudid 4mg (takes in the AM)   - gabapentin 300mg (doesn't take it - due to risk of side effects)     report:  Reviewed and consistent with medication use  as prescribed.      Pain Procedures:       Imaging:   XR LUMBAR SPINE AP AND LATERAL     CLINICAL HISTORY:  Vertebrogenic low back pain     FINDINGS:  LUMBAR SPINE TWO VIEWS: THERE IS A T11 COMPRESSION FRACTURE THAT MAY HAVE PROGRESSED WHEN COMPARED TO THE PRIOR STUDY.  THERE IS BASELINE DJD AND DISH.     Impression:     T11 COMPRESSION FRACTURE MAY HAVE PROGRESSED FROM THE PRIOR STUDY.        Electronically signed by: Jace Ching MD  Date:                                            03/01/2024  Time:                                           13:03    XR THORACIC SPINE AP LATERAL     CLINICAL HISTORY:  Vertebrogenic low back pain     FINDINGS:  Thoracic spine two views: There is mild DJD.  There is dish.  There is a T11 compression deformity which was seen on the CT study dated 11/15/2022.  This may have progressed when compared to the prior study dated 11/15/2022.     Impression:     T11 compression deformity may have progressed from the prior CT.     DJD and dish.        Electronically signed by: Jace Ching MD  Date:                                            03/01/2024  Time:                                           13:02    Past Medical History:   Diagnosis Date    Arthritis     Atrial myxoma     Coronary atherosclerosis     Diabetes mellitus, type 2     Difficult intubation     Heart failure     Hepatitis B     Hyperlipidemia     Hypertension     Non-alcoholic fatty liver disease     Rheumatoid arthritis     Rheumatoid arthritis flare 07/12/2021    Squamous cell carcinoma of forehead 10/26/2023    forehead    Stroke     TIA     Past Surgical History:   Procedure Laterality Date    CORONARY ANGIOGRAPHY N/A 3/10/2021    Procedure: ANGIOGRAM, CORONARY ARTERY - right radial;  Surgeon: Shemar Dempsey MD;  Location: [facility] CATH LAB;  Service: Cardiology;  Laterality: N/A;    CORONARY STENT PLACEMENT  03/10/2021    prox-mid RCA [place] 4.5 x 26 mm, 4.5 x 12 mm    INCISION AND DRAINAGE OF SCROTUM N/A 11/15/2022     Procedure: INCISION AND DRAINAGE, SCROTUM;  Surgeon: William Hatch MD;  Location: [facility] OR;  Service: Urology;  Laterality: N/A;    INCISION AND DRAINAGE OF SCROTUM N/A 11/17/2022    Procedure: INCISION AND DRAINAGE, SCROTUM;  Surgeon: William Hatch MD;  Location: [facility] OR;  Service: Urology;  Laterality: N/A;    LUNG LOBECTOMY Right 2008    RUL lobectomy after removal of atrial myxoma    PLEURA BIOPSY      RESECTION OF ATRIAL MYXOMA  2007     Social History     Socioeconomic History    Marital status: Single   Occupational History    Occupation: , respiratory therapist, founded [employer]     Comment: Retired   Tobacco Use    Smoking status: Every Day     Current packs/day: 1.00     Average packs/day: 1 pack/day for 35.0 years (35.0 ttl pk-yrs)     Types: Cigarettes    Smokeless tobacco: Never   Substance and Sexual Activity    Alcohol use: Not Currently    Drug use: No    Sexual activity: Never     Social Determinants of Health     Financial Resource Strain: Low Risk  (2/20/2024)    Overall Financial Resource Strain (CARDIA)     Difficulty of Paying Living Expenses: Not very hard   Recent Concern: Financial Resource Strain - Medium Risk (11/24/2023)    Overall Financial Resource Strain (CARDIA)     Difficulty of Paying Living Expenses: Somewhat hard   Food Insecurity: No Food Insecurity (2/20/2024)    Hunger Vital Sign     Worried About Running Out of Food in the Last Year: Never true     Ran Out of Food in the Last Year: Never true   Recent Concern: Food Insecurity - Food Insecurity Present (11/24/2023)    Hunger Vital Sign     Worried About Running Out of Food in the Last Year: Sometimes true     Ran Out of Food in the Last Year: Patient declined   Transportation Needs: No Transportation Needs (2/20/2024)    PRAPARE - Transportation     Lack of Transportation (Medical): No     Lack of Transportation (Non-Medical): No   Recent Concern: Transportation Needs - Unmet Transportation Needs  (11/24/2023)    PRAPARE - Transportation     Lack of Transportation (Medical): Yes     Lack of Transportation (Non-Medical): Yes   Physical Activity: Insufficiently Active (2/20/2024)    Exercise Vital Sign     Days of Exercise per Week: 1 day     Minutes of Exercise per Session: 10 min   Stress: No Stress Concern Present (2/20/2024)    Citizen of the [place] of Occupational Health - Occupational Stress Questionnaire     Feeling of Stress : Not at all   Recent Concern: Stress - Stress Concern Present (11/24/2023)    Citizen of [place] of Occupational Health - Occupational Stress Questionnaire     Feeling of Stress : To some extent   Social Connections: Unknown (11/24/2023)    Social Connection and Isolation Panel [NHANES]     Frequency of Communication with Friends and Family: More than three times a week     Frequency of Social Gatherings with Friends and Family: Once a week     Active Member of Clubs or Organizations: Yes     Attends Club or Organization Meetings: More than 4 times per year     Marital Status:    Housing Stability: Low Risk  (2/20/2024)    Housing Stability Vital Sign     Unable to Pay for Housing in the Last Year: No     Number of Places Lived in the Last Year: 1     Unstable Housing in the Last Year: No   Recent Concern: Housing Stability - High Risk (11/24/2023)    Housing Stability Vital Sign     Unable to Pay for Housing in the Last Year: Yes     Number of Places Lived in the Last Year: 1     Unstable Housing in the Last Year: No     Family History   Problem Relation Age of Onset    Hodgkin's lymphoma Mother     Diabetes type II Mother     Kidney failure Father     Diabetes type I Father     Cancer Sister        Review of patient's allergies indicates:   Allergen Reactions    Enbrel [etanercept] Shortness Of Breath     CHF    Nsaids (non-steroidal anti-inflammatory drug) Other (See Comments)     hypertention  Other reaction(s): Other (See Comments)  hypertention  HTN    Statins-hmg-coa  "reductase inhibitors Other (See Comments)     Heart arrythemias  Other reaction(s): Other (See Comments)  Heart arrythemias  Joint pain and cardiac arrythmias    Pcn [penicillins] Rash       Current Outpatient Medications   Medication Sig    aspirin 81 MG Chew Take 81 mg by mouth.    BD ULTRA-FINE RAHUL PEN NEEDLE 32 gauge x 5/32" Ndle To use 6 daily    carvediloL (COREG) 6.25 MG tablet Take 1 tablet (6.25 mg total) by mouth 2 (two) times daily with meals.    digoxin (LANOXIN) 125 mcg tablet TAKE 1 TABLET BY MOUTH ONCE DAILY.    diltiaZEM (CARDIZEM CD) 120 MG Cp24 Take 120 mg by mouth Daily.    ergocalciferol (ERGOCALCIFEROL) 50,000 unit Cap Take 1 capsule (50,000 Units total) by mouth every 7 days.    folic acid (FOLVITE) 1 MG tablet Take 1 tablet (1 mg total) by mouth once daily.    furosemide (LASIX) 80 MG tablet Take 1 tablet (80 mg total) by mouth 2 (two) times a day.    HYDROcodone-acetaminophen (NORCO)  mg per tablet TAKE 1 TAB BY MOUTH EVERY 6 HOURS AS NEEDED FOR PAIN, GREATER THAN 7 DAYS MEDICALLY NECESSARY. STOP TAKING NORCO 5/325    HYDROcodone-acetaminophen (NORCO)  mg per tablet Take 1 tablet by mouth every 6 (six) hours as needed. GREATER THAN 7 DAYS MEDICALLY NECESSARY    HYDROcodone-acetaminophen (NORCO)  mg per tablet TAKE 1 tablet by mouth every 6 hours as needed for  PAIN, GREATER THAN 7 DAYS MEDICALLY NECESSARY    HYDROmorphone (DILAUDID) 4 MG tablet take 1 tablet by mouth every 8 hours as needed for pain.  Only for short term use.  Do not take with Norco    HYDROmorphone (DILAUDID) 4 MG tablet TAKE ONE (1) TABLET BY MOUTH EVERY EIGHT(8) HOURS AS NEEDED FOR PAIN. ONLY FOR SHORT TERM USE. - DO NOT TAKE WITH NORCO    insulin aspart, niacinamide, (FIASP PENFILL U-100 INSULIN) 100 unit/mL (3 mL) Crtg pen To use with inPen; 140 max daily dose.    insulin degludec (TRESIBA FLEXTOUCH U-200) 200 unit/mL (3 mL) insulin pen Inject 60 Units into the skin once daily. Cartridge for novoecho "    lamiVUDine (EPIVIR) 150 MG Tab TAKE 1 TABLET BY MOUTH EVERY DAY    multivitamin (THERAGRAN) per tablet Take 1 tablet by mouth once daily.    mupirocin (BACTROBAN) 2 % ointment Apply to affected area 3 times daily    nitroGLYCERIN (NITROSTAT) 0.4 MG SL tablet PLACE 1 TABLET UNDER THE TONGUE EVERY 5 MINUTES AS NEEDED FOR CHEST PAIN.    nystatin (MYCOSTATIN) powder Apply topically 4 (four) times daily. Apply to intertriginous areas as directed up to four times daily to reduce moisture. for 14 days    olmesartan (BENICAR) 40 MG tablet TAKE 1 TABLET BY MOUTH EVERY DAY AT NIGHT    predniSONE (DELTASONE) 2.5 MG tablet TAKE 5 TABLETS (12.5 MG TOTAL) BY MOUTH ONCE DAILY.    predniSONE (DELTASONE) 5 MG tablet Take 2 tablets (10 mg total) by mouth once daily.    PROAIR HFA 90 mcg/actuation inhaler     sulfamethoxazole-trimethoprim 800-160mg (BACTRIM DS) 800-160 mg Tab Take 1 tablet by mouth 2 (two) times daily.    tirzepatide 7.5 mg/0.5 mL PnIj Inject 7.5 mg into the skin every 7 days.    tocilizumab 162 mg/0.9 mL PnIj Inject 162 mg into the skin once a week.     No current facility-administered medications for this visit.       REVIEW OF SYSTEMS:    GENERAL:  current fevers or chills, recent use of antibiotics denies.  HEENT:  History of migraines/headaches: denies, History of major throat surgery: denies  RESPIRATORY:  History of home oxygen or pulmonary hypertension/severe breathing dysfunction: reports right pneumonectomy and upper lobectomy  CARDIOVASCULAR:  Hx of palpitations/rhythm problems: denies Hx of Heart Attacks/Surgery: reports prior 3 stents, history right atrial myxoma  GI:  Recent abdominal discomfort or recent change in bowel habits reports abdominal pain (also has constipation)  MUSCULOSKELETAL:  See HPI.  SKIN:  unhealed wounds or rashes: reports scrotal yeast infection and skin tears related to prednisone  PSYCH: major psychiatric history or recent psychosocial stressors reports occasional suicidal  thoughts  HEMATOLOGY/LYMPHOLOGY:  Hx of prolonged bleeding, Hx of Blood thinner usage: reports ASA 81mg ; reason: stents  NEURO:   history of seizures, strokes, chronic/old weakness (such as paralysis or paresis of any body part): reports TIA during period of myxoma (no deficits)  All other reviewed and negative other than HPI.    OBJECTIVE:    /60 (BP Location: Left arm, Patient Position: Sitting, BP Method: Large (Automatic))   Pulse 91   Resp 12   Ht 6' (1.829 m)   Wt 99.8 kg (220 lb 0.3 oz)   SpO2 100%   BMI 29.84 kg/m²     PHYSICAL EXAMINATION:  General appearance: Well appearing, in no acute distress, alert and appropriately communicative.  Psych:  Mood and affect appropriate.  Skin: Skin color, texture, turgor normal, no rashes or lesions, in both upper and lower body for exposed skin.  Head/face:  Atraumatic, normocephalic.  Cor: regular rate  Pulm: non-labored breathing  GI: Abdomen non-distended and non-tender.  Back: Straight leg raising in the sitting and supine positions is negative to radicular pain.  pain to palpation over the spine and/or paraspinal muscles. Pain with lumbar flexion. Pain to percussion just under rib cage.  Extremities: No deformities, significant lymphedema, or skin discoloration. No significant open wounds. No major amputations.  Musculoskeletal:  Bilateral upper and lower extremity strength is normal and symmetric with the exception of left IP 4/5.  No atrophy or tone abnormalities are noted.  Neuro: Bilateral upper and lower extremity coordination and muscle stretch reflexes abnormalities noted:.  Brewer and/or Clonus: none; loss of sensation to light touch: none  Gait: walker    CMP  Sodium   Date Value Ref Range Status   03/01/2024 140 136 - 145 mmol/L Final     Potassium   Date Value Ref Range Status   03/01/2024 4.0 3.5 - 5.1 mmol/L Final     Chloride   Date Value Ref Range Status   03/01/2024 100 95 - 110 mmol/L Final     CO2   Date Value Ref Range Status    03/01/2024 32 (H) 23 - 29 mmol/L Final     Carbon Monoxide, Blood   Date Value Ref Range Status   11/04/2021 11 (H) % Final     Comment:     -------------------REFERENCE VALUE--------------------------  0-2 Normal (Non-smoker) , < or = 9 Normal (Smoker), > or   = 20 (Toxic concentration)    Test Performed by:  [facility]  [address] [place] [address]  : Demetrius Saldana M.D. Ph.D.; CLIA# [id number]       Glucose   Date Value Ref Range Status   03/01/2024 97 70 - 110 mg/dL Final     BUN   Date Value Ref Range Status   03/01/2024 20 8 - 23 mg/dL Final     Creatinine   Date Value Ref Range Status   03/01/2024 1.0 0.5 - 1.4 mg/dL Final     Calcium   Date Value Ref Range Status   03/01/2024 9.2 8.7 - 10.5 mg/dL Final     Total Protein   Date Value Ref Range Status   03/01/2024 6.3 6.0 - 8.4 g/dL Final     Albumin   Date Value Ref Range Status   03/01/2024 3.4 (L) 3.5 - 5.2 g/dL Final   06/21/2022 4.1 3.6 - 5.1 g/dL Final     Total Bilirubin   Date Value Ref Range Status   03/01/2024 0.6 0.1 - 1.0 mg/dL Final     Comment:     For infants and newborns, interpretation of results should be based  on gestational age, weight and in agreement with clinical  observations.    Premature Infant recommended reference ranges:  Up to 24 hours.............<8.0 mg/dL  Up to 48 hours............<12.0 mg/dL  3-5 days..................<15.0 mg/dL  6-29 days.................<15.0 mg/dL       Alkaline Phosphatase   Date Value Ref Range Status   03/01/2024 102 55 - 135 U/L Final     AST   Date Value Ref Range Status   03/01/2024 24 10 - 40 U/L Final     ALT   Date Value Ref Range Status   03/01/2024 52 (H) 10 - 44 U/L Final     Anion Gap   Date Value Ref Range Status   03/01/2024 8 8 - 16 mmol/L Final     eGFR   Date Value Ref Range Status   03/01/2024 >60 >60 mL/min/1.73 m^2 Final     ASSESSMENT: 68 y.o. year old male with mid back, low back, and bilateral sciatica pain,  consistent with:    1. Lumbar radiculopathy  MRI Lumbar Spine Without Contrast    Ambulatory referral/consult to Physical/Occupational Therapy      2. Closed wedge compression fracture of T11 vertebra, initial encounter  Procedure Order to Pain Management    CANCELED: Procedure Order to Pain Management      3. Pain in thoracic spine  MRI Thoracic Spine Without Contrast        IMPRESSION: Nithin Wagner presents today for mid back, low back, and bilateral sciatica pain.  Of all of these, he feels the thoracic pain is the most constant and troublesome.  This has become constant in the past 5 weeks following an injury (was holding heavy objects that were slipping and felt a pop).  History and physical exam are consistent with axial thoracic/vertebrogenic pain and lumbar radiculopathy. Imaging is consistent with compression fracture at T11 and lumbar degenerative disc disease.  He would benefit from kyphoplasty due to the acuity of his symptoms.  We will start with MRI thoracic and lumbar spine to further assess the compression fracture and lumbar radiculopathy. We will also schedule for kyphoplasty to be completed after the MRI is obtained.    PLAN:   - I have stressed the importance of physical activity and a home exercise plan to help with pain and improve health.  - Patient can continue with medications for now since they are providing benefits, using them appropriately, and without side effects.  - MRI thoracic and lumbar spine w/o contrast due to worsened compression fracture in the past 5 weeks.  - referral to physical therapy for lower back pain (can be done after kyphoplasty)  - plan for T11 kyphoplasty considering the acuity of his pain and the new finding of a progressed T11 compression fracture.  - of note, he will need medical/anesthesia optimization prior to his kyphoplasty due to extensive cardiopulmonary history or chronic prednisone use  - RTC after kyphoplasty  - Counseled patient regarding the  importance of activity modification and physical therapy.    The above plan and management options were discussed at length with patient. Patient is in agreement with the above and verbalized understanding. It will be communicated with the referring physician via electronic record, fax, or mail.    Stefan Stern  03/14/2024

## 2024-03-15 ENCOUNTER — TELEPHONE (OUTPATIENT)
Dept: OPHTHALMOLOGY | Facility: CLINIC | Age: 69
End: 2024-03-15
Payer: MEDICARE

## 2024-03-15 DIAGNOSIS — H25.12 NUCLEAR SCLEROTIC CATARACT OF LEFT EYE: Primary | ICD-10-CM

## 2024-03-16 LAB
GAMMA INTERFERON BACKGROUND BLD IA-ACNC: 0 IU/ML
M TB IFN-G CD4+ BCKGRND COR BLD-ACNC: 0 IU/ML
M TB IFN-G CD4+ BCKGRND COR BLD-ACNC: 0 IU/ML
MITOGEN IGNF BCKGRD COR BLD-ACNC: 10 IU/ML
TB GOLD PLUS: NEGATIVE

## 2024-03-18 ENCOUNTER — TELEPHONE (OUTPATIENT)
Dept: PAIN MEDICINE | Facility: CLINIC | Age: 69
End: 2024-03-18
Payer: MEDICARE

## 2024-03-18 ENCOUNTER — PATIENT MESSAGE (OUTPATIENT)
Dept: SPINE | Facility: CLINIC | Age: 69
End: 2024-03-18
Payer: MEDICARE

## 2024-03-18 NOTE — TELEPHONE ENCOUNTER
----- Message from Katherine Dahl sent at 3/18/2024 10:50 AM CDT -----  Regarding: call back  Type: Patient Call Back    Who called: pt     What is the request in detail: requesting a call to see if provider can order images for his persistent issues with his hip, also wants to see if provider can see him to treat hip pain     Can the clinic reply by [facility]?no    Would the patient rather a call back or a response via [facility]? call    Best call back number: [phone]     Additional Information:

## 2024-03-19 ENCOUNTER — PATIENT MESSAGE (OUTPATIENT)
Dept: RHEUMATOLOGY | Facility: CLINIC | Age: 69
End: 2024-03-19
Payer: MEDICARE

## 2024-03-19 RX ORDER — LEFLUNOMIDE 10 MG/1
10 TABLET ORAL
Qty: 30 TABLET | Refills: 0 | Status: ON HOLD | OUTPATIENT
Start: 2024-03-19 | End: 2024-04-22 | Stop reason: ALTCHOICE

## 2024-03-20 ENCOUNTER — PATIENT MESSAGE (OUTPATIENT)
Dept: SPINE | Facility: CLINIC | Age: 69
End: 2024-03-20
Payer: MEDICARE

## 2024-03-20 ENCOUNTER — TELEPHONE (OUTPATIENT)
Dept: SPINE | Facility: CLINIC | Age: 69
End: 2024-03-20
Payer: MEDICARE

## 2024-03-20 NOTE — TELEPHONE ENCOUNTER
Spoke to Mr. Wagner regarding his pain management.  He is currently scheduled at this point to have a T11 kyphoplasty on 4/22/2024.  He reports that he has spoken to his primary care provider who had asked him to transfer his medication prescribing to our practice. At this point, there is no indication in transferring his prescribing to our clinic as his primary are provider is adequately managing his pain with his current regimen.  I further explained that the upcoming procedure he will have, or any other procedures we may offer, would seek to reduce the his pain sufficiently to reduce and potentially eliminate the need for additional opiate prescribing.    There is no plan to take over opiate prescribing.

## 2024-03-22 ENCOUNTER — PATIENT MESSAGE (OUTPATIENT)
Dept: CARDIOLOGY | Facility: CLINIC | Age: 69
End: 2024-03-22
Payer: MEDICARE

## 2024-03-22 DIAGNOSIS — I50.33 ACUTE ON CHRONIC DIASTOLIC HEART FAILURE: Primary | ICD-10-CM

## 2024-03-23 ENCOUNTER — HOSPITAL ENCOUNTER (OUTPATIENT)
Dept: RADIOLOGY | Facility: OTHER | Age: 69
Discharge: HOME | End: 2024-03-23
Attending: STUDENT IN AN ORGANIZED HEALTH CARE EDUCATION/TRAINING PROGRAM
Payer: MEDICARE

## 2024-03-23 DIAGNOSIS — M54.16 LUMBAR RADICULOPATHY: ICD-10-CM

## 2024-03-23 DIAGNOSIS — M54.6 PAIN IN THORACIC SPINE: ICD-10-CM

## 2024-03-23 PROCEDURE — 72146 MRI CHEST SPINE W/O DYE: CPT | Mod: 26,,, | Performed by: RADIOLOGY

## 2024-03-23 PROCEDURE — 72148 MRI LUMBAR SPINE W/O DYE: CPT | Mod: 26,,, | Performed by: RADIOLOGY

## 2024-03-23 PROCEDURE — 72146 MRI CHEST SPINE W/O DYE: CPT | Mod: TC

## 2024-03-23 PROCEDURE — 72148 MRI LUMBAR SPINE W/O DYE: CPT | Mod: TC

## 2024-03-25 ENCOUNTER — TELEPHONE (OUTPATIENT)
Dept: OPHTHALMOLOGY | Facility: CLINIC | Age: 69
End: 2024-03-25
Payer: MEDICARE

## 2024-03-25 ENCOUNTER — PATIENT MESSAGE (OUTPATIENT)
Dept: OPHTHALMOLOGY | Facility: CLINIC | Age: 69
End: 2024-03-25
Payer: MEDICARE

## 2024-03-25 NOTE — TELEPHONE ENCOUNTER
Patient given arrival time of 10:30 am on Thursday March 28 . Nothing to eat or drink after 11:59 pm.  Start drops into the operative eye tomorrow . [address] [facility]

## 2024-03-27 RX ORDER — PREDNISOLONE ACETATE 10 MG/ML
1 SUSPENSION/ DROPS OPHTHALMIC 3 TIMES DAILY
Qty: 10 ML | Refills: 3 | Status: ON HOLD | OUTPATIENT
Start: 2024-03-27 | End: 2024-05-07 | Stop reason: HOSPADM

## 2024-03-27 RX ORDER — MOXIFLOXACIN 5 MG/ML
1 SOLUTION/ DROPS OPHTHALMIC 3 TIMES DAILY
Qty: 3 ML | Refills: 3 | Status: ON HOLD | OUTPATIENT
Start: 2024-03-27 | End: 2024-05-07 | Stop reason: HOSPADM

## 2024-03-27 NOTE — PRE-PROCEDURE INSTRUCTIONS
Patient reviewed on 3/27/2024.  Okay to proceed at [place]. The following pre-procedure instructions and arrival time have been reviewed with patient via phone and sent to patient portal for review.  Patient verbalized an understanding.  Pt to be accompanied by his nephew day of procedure as responsible .    Dear Nithin     Below you will find basic pre-procedure instructions in preparation for your procedure on 3/28/24 with Dr. Woodward     You should have received your arrival time already from Dr's office.     - Nothing to eat or drink after midnight the night before your procedure until after your procedure, except AM meds with small sips of water.     - HOLD all oral Diabetic medications night before and morning of procedure  - HOLD all Insulin morning of procedure  - HOLD all Fluid pills morning of procedure  - HOLD all non-insulin shots until after surgery (Ozempic, Mounjaro, Trulicity, Victoza, Byetta, Wegovy and Adlyxin) (7 days prior)  - HOLD all vitamins, minerals and herbal supplements morning of procedure   - TAKE all B/P meds, EXCEPT those that contain a fluid pill (ex. Lasix, Hydroclorothiazide/HCTZ, Spirnolactone)  - USE inhalers as needed and bring AM of surgery  - USE eye drops as directed  -TAKE blood thinner meds AM of surgery unless otherwise instructed by your provider to not take     - Shower and wash face with dial soap for 3 mins PM prior and AM of surgery  - No powder, lotions, creams, oils, gels, ointments, makeup,  or jewelry    - Wear comfortable clothing (button up shirt)     (Patient is required to have a responsible ride to transport home, ride may not leave while patient is in surgery)     -- [facility], 2nd floor Surgery Center, located   @ [address] [place] [id number]  2nd Floor Registration        If you have any questions or concerns please feel free to contact your surgeon's office.           Please reply to this message as receipt of  delivery.     [place], LPN [facility]  Pre-Admit - Anesthesia Dept

## 2024-03-28 ENCOUNTER — OFFICE VISIT (OUTPATIENT)
Dept: OPHTHALMOLOGY | Facility: CLINIC | Age: 69
End: 2024-03-28
Payer: MEDICARE

## 2024-03-28 ENCOUNTER — HOSPITAL ENCOUNTER (OUTPATIENT)
Facility: HOSPITAL | Age: 69
Discharge: HOME | End: 2024-03-28
Attending: OPHTHALMOLOGY | Admitting: OPHTHALMOLOGY
Payer: MEDICARE

## 2024-03-28 VITALS
HEART RATE: 91 BPM | HEIGHT: 71 IN | RESPIRATION RATE: 18 BRPM | DIASTOLIC BLOOD PRESSURE: 91 MMHG | OXYGEN SATURATION: 93 % | WEIGHT: 221 LBS | SYSTOLIC BLOOD PRESSURE: 151 MMHG | BODY MASS INDEX: 30.94 KG/M2 | TEMPERATURE: 98 F

## 2024-03-28 DIAGNOSIS — H25.12 NUCLEAR SCLEROTIC CATARACT OF LEFT EYE: ICD-10-CM

## 2024-03-28 DIAGNOSIS — Z98.890 POST-OPERATIVE STATE: Primary | ICD-10-CM

## 2024-03-28 DIAGNOSIS — H25.11 NUCLEAR SCLEROTIC CATARACT OF RIGHT EYE: Primary | ICD-10-CM

## 2024-03-28 LAB — POCT GLUCOSE: 206 MG/DL (ref 70–110)

## 2024-03-28 PROCEDURE — 1160F RVW MEDS BY RX/DR IN RCRD: CPT | Mod: CPTII,S$GLB,, | Performed by: OPHTHALMOLOGY

## 2024-03-28 PROCEDURE — 3066F NEPHROPATHY DOC TX: CPT | Mod: CPTII,S$GLB,, | Performed by: OPHTHALMOLOGY

## 2024-03-28 PROCEDURE — 25000003 PHARM REV CODE 250: Performed by: OPHTHALMOLOGY

## 2024-03-28 PROCEDURE — 3051F HG A1C>EQUAL 7.0%<8.0%: CPT | Mod: CPTII,S$GLB,, | Performed by: OPHTHALMOLOGY

## 2024-03-28 PROCEDURE — 63600175 PHARM REV CODE 636 W HCPCS: Performed by: OPHTHALMOLOGY

## 2024-03-28 PROCEDURE — 99999 PR PBB SHADOW E&M-EST. PATIENT-LVL III: CPT | Mod: PBBFAC,,, | Performed by: OPHTHALMOLOGY

## 2024-03-28 PROCEDURE — 99024 POSTOP FOLLOW-UP VISIT: CPT | Mod: S$GLB,,, | Performed by: OPHTHALMOLOGY

## 2024-03-28 PROCEDURE — 94760 N-INVAS EAR/PLS OXIMETRY 1: CPT

## 2024-03-28 PROCEDURE — 99900035 HC TECH TIME PER 15 MIN (STAT)

## 2024-03-28 PROCEDURE — 66984 XCAPSL CTRC RMVL W/O ECP: CPT | Mod: RT,,, | Performed by: OPHTHALMOLOGY

## 2024-03-28 PROCEDURE — 4010F ACE/ARB THERAPY RXD/TAKEN: CPT | Mod: CPTII,S$GLB,, | Performed by: OPHTHALMOLOGY

## 2024-03-28 PROCEDURE — 1101F PT FALLS ASSESS-DOCD LE1/YR: CPT | Mod: CPTII,S$GLB,, | Performed by: OPHTHALMOLOGY

## 2024-03-28 PROCEDURE — 1126F AMNT PAIN NOTED NONE PRSNT: CPT | Mod: CPTII,S$GLB,, | Performed by: OPHTHALMOLOGY

## 2024-03-28 PROCEDURE — 71000015 HC POSTOP RECOV 1ST HR: Performed by: OPHTHALMOLOGY

## 2024-03-28 PROCEDURE — 3288F FALL RISK ASSESSMENT DOCD: CPT | Mod: CPTII,S$GLB,, | Performed by: OPHTHALMOLOGY

## 2024-03-28 PROCEDURE — 36000706: Performed by: OPHTHALMOLOGY

## 2024-03-28 PROCEDURE — 36000707: Performed by: OPHTHALMOLOGY

## 2024-03-28 PROCEDURE — 1159F MED LIST DOCD IN RCRD: CPT | Mod: CPTII,S$GLB,, | Performed by: OPHTHALMOLOGY

## 2024-03-28 PROCEDURE — 82962 GLUCOSE BLOOD TEST: CPT | Performed by: OPHTHALMOLOGY

## 2024-03-28 PROCEDURE — 3061F NEG MICROALBUMINURIA REV: CPT | Mod: CPTII,S$GLB,, | Performed by: OPHTHALMOLOGY

## 2024-03-28 PROCEDURE — V2632 POST CHMBR INTRAOCULAR LENS: HCPCS | Performed by: OPHTHALMOLOGY

## 2024-03-28 DEVICE — LENS CLAREON AUTONOME 21.0D: Type: IMPLANTABLE DEVICE | Site: EYE | Status: FUNCTIONAL

## 2024-03-28 RX ORDER — CYCLOP/TROP/PROPA/PHEN/KET/WAT 1-1-0.1%
1 DROPS (EA) OPHTHALMIC (EYE)
Status: COMPLETED | OUTPATIENT
Start: 2024-03-28 | End: 2024-03-28

## 2024-03-28 RX ORDER — MOXIFLOXACIN 5 MG/ML
1 SOLUTION/ DROPS OPHTHALMIC
Status: DISCONTINUED | OUTPATIENT
Start: 2024-03-28 | End: 2024-03-28 | Stop reason: HOSPADM

## 2024-03-28 RX ORDER — GABAPENTIN 300 MG/1
CAPSULE ORAL 2 TIMES DAILY
Status: ON HOLD | COMMUNITY
Start: 2024-03-26 | End: 2024-05-07 | Stop reason: HOSPADM

## 2024-03-28 RX ORDER — PROPARACAINE HYDROCHLORIDE 5 MG/ML
1 SOLUTION/ DROPS OPHTHALMIC
Status: DISCONTINUED | OUTPATIENT
Start: 2024-03-28 | End: 2024-03-28 | Stop reason: HOSPADM

## 2024-03-28 RX ORDER — MIDAZOLAM HYDROCHLORIDE 1 MG/ML
1 INJECTION, SOLUTION INTRAMUSCULAR; INTRAVENOUS CONTINUOUS PRN
Status: DISCONTINUED | OUTPATIENT
Start: 2024-03-28 | End: 2024-03-28 | Stop reason: HOSPADM

## 2024-03-28 RX ORDER — PHENYLEPHRINE HYDROCHLORIDE 100 MG/ML
1 SOLUTION/ DROPS OPHTHALMIC
Status: DISCONTINUED | OUTPATIENT
Start: 2024-03-28 | End: 2024-03-28 | Stop reason: HOSPADM

## 2024-03-28 RX ORDER — METOPROLOL TARTRATE 50 MG/1
50 TABLET ORAL EVERY 12 HOURS
Status: ON HOLD | COMMUNITY
Start: 2023-11-13 | End: 2024-05-07 | Stop reason: HOSPADM

## 2024-03-28 RX ORDER — FENTANYL CITRATE 50 UG/ML
25 INJECTION, SOLUTION INTRAMUSCULAR; INTRAVENOUS EVERY 5 MIN PRN
Status: DISCONTINUED | OUTPATIENT
Start: 2024-03-28 | End: 2024-03-28 | Stop reason: HOSPADM

## 2024-03-28 RX ORDER — SODIUM CHLORIDE 0.9 % (FLUSH) 0.9 %
10 SYRINGE (ML) INJECTION
Status: DISCONTINUED | OUTPATIENT
Start: 2024-03-28 | End: 2024-03-28 | Stop reason: HOSPADM

## 2024-03-28 RX ORDER — ESCITALOPRAM OXALATE 20 MG/1
20 TABLET ORAL NIGHTLY
COMMUNITY
Start: 2024-03-20

## 2024-03-28 RX ORDER — ACETAMINOPHEN 325 MG/1
650 TABLET ORAL EVERY 4 HOURS PRN
Status: DISCONTINUED | OUTPATIENT
Start: 2024-03-28 | End: 2024-03-28 | Stop reason: HOSPADM

## 2024-03-28 RX ORDER — LIDOCAINE HYDROCHLORIDE 40 MG/ML
INJECTION, SOLUTION RETROBULBAR
Status: DISCONTINUED | OUTPATIENT
Start: 2024-03-28 | End: 2024-03-28 | Stop reason: HOSPADM

## 2024-03-28 RX ORDER — PROPARACAINE HYDROCHLORIDE 5 MG/ML
SOLUTION/ DROPS OPHTHALMIC
Status: DISCONTINUED | OUTPATIENT
Start: 2024-03-28 | End: 2024-03-28 | Stop reason: HOSPADM

## 2024-03-28 RX ORDER — MOXIFLOXACIN 5 MG/ML
SOLUTION/ DROPS OPHTHALMIC
Status: DISCONTINUED | OUTPATIENT
Start: 2024-03-28 | End: 2024-03-28 | Stop reason: HOSPADM

## 2024-03-28 RX ORDER — MOXIFLOXACIN 5 MG/ML
1 SOLUTION/ DROPS OPHTHALMIC
Status: COMPLETED | OUTPATIENT
Start: 2024-03-28 | End: 2024-03-28

## 2024-03-28 RX ADMIN — MOXIFLOXACIN OPHTHALMIC 1 DROP: 5 SOLUTION/ DROPS OPHTHALMIC at 12:03

## 2024-03-28 RX ADMIN — MOXIFLOXACIN 1 DROP: 5 SOLUTION/ DROPS OPHTHALMIC at 02:03

## 2024-03-28 RX ADMIN — Medication 1 DROP: at 01:03

## 2024-03-28 RX ADMIN — Medication 1 DROP: at 12:03

## 2024-03-28 RX ADMIN — MIDAZOLAM HYDROCHLORIDE 3 MG: 1 INJECTION, SOLUTION INTRAMUSCULAR; INTRAVENOUS at 02:03

## 2024-03-28 RX ADMIN — MOXIFLOXACIN OPHTHALMIC 1 DROP: 5 SOLUTION/ DROPS OPHTHALMIC at 01:03

## 2024-03-28 NOTE — PROGRESS NOTES
1455-Pt brought to PACU and placed in stretcher to nap while waiting for nephew to arrive. Report to S Schoen RN from [facility]. Pt AOX4 and no concerns at this time.     1510-Pt wheeled downstairs by S Schoen RN to registration desk for appointment with MD Woodward; accompanied by nephew. Pt AOX4 and no concerns at this time.

## 2024-03-28 NOTE — PLAN OF CARE
Patient discharge instructions reviewed, patient verbalized understanding. Tolerated PO fluids well,  IV removed, cath tip intact.  Escorted to family via wheelchair.  No concerns voiced.

## 2024-03-28 NOTE — DISCHARGE SUMMARY
Outcome: Successful outpatient ophthalmic surgical procedure  Preprinted Instructions given to patient.  Regular diet.  Activity: No restrictions  Meds: see Med Rec  Condition: stable  Follow up: 1 day with Dr Woodward  Disposition: Home  Diagnosis: s/p eye surgery  Date of discharge: 03/28/2024

## 2024-03-28 NOTE — OP NOTE
SURGEON:  Hans Woodward M.D.    PREOPERATIVE DIAGNOSIS:    Nuclear Sclerotic Cataract Right Eye    POSTOPERATIVE DIAGNOSIS:    Nuclear Sclerotic Cataract Right Eye    PROCEDURES:    Phacoemulsification with  intraocular lens, Right eye (80258)    DATE OF SURGERY: 03/28/2024    IMPLANT: [email or web address] 21.0    ANESTHESIA:  moderate sedation with topical Lidocaine. Patient ID confirmed and re-evaluated the patient and anesthesia plan confirming it is suitable for the patient's condition and procedure.    COMPLICATIONS:  None    ESTIMATED BLOOD LOSS: None    SPECIMENS: None    INDICATIONS:    The patient has a history of painless progressive visual loss and difficulty with activities of daily living, which specifically include difficult driving at night due to glare and difficulty reading small print, secondary to cataract formation.  After a thorough discussion of the risks, benefits, and alternatives to cataract surgery, including, but not limited to, the rare risks of infection, retinal detachment, hemorrhage, need for additional surgery, loss of vision, and even loss of the eye, the patient voices understanding and desires to proceed.    DESCRIPTION OF PROCEDURE:    The patients IOL calculations were reviewed, and the lens selection confirmed.   After verification and marking of the proper eye in the preop holding area, the patient was brought to the operating room in supine position where the eye was prepped and draped in standard sterile fashion with 5% Betadine and a lid speculum placed in the eye.   Topical 4% Lidocaine was used in addition to the preoperative anesthesia and the procedure was begun by the creation of a paracentesis incision through which viscoelastic was used to fill the anterior chamber.  Next, a keratome blade was used to create a triplanar temporal clear corneal incision and a cystotome and Utrata forceps used to fashion a continuous curvilinear capsulorrhexis.  Hydrodissection was carried out using  the Bradshaw hydrodissection cannula and the nucleus was found to be mobile.  Phacoemulsification of the nucleus was carried out using a quick chop technique, and all remaining epinuclear and cortical material was removed.  The eye was then reformed with Viscoelastic and the  intraocular lens was implanted into the capsular bag.  All remaining viscoelastics were removed from the eye and at the end of the case the pupil was round, the lens was well-centered within the capsular bag and all wounds were found to be water tight.  Drops of Vigamox and Pred Forte were instilled and a shield was placed over the eye. The patient will follow up with Dr. Woodward in the morning.

## 2024-03-28 NOTE — PROGRESS NOTES
HPI    DATE OF SURGERY: 03/28/2024  IMPLANT: [email or web address] 21.0    Patient present today for same day Post Op   No complaints at this time     PGB TID OD     Last edited by Frances Fu on 3/28/2024  3:48 PM.            Assessment /Plan     For exam results, see Encounter Report.    Post-operative state    Nuclear sclerotic cataract of left eye      Slit lamp exam:  L/L: nl  K: clear, wound sealed  AC: 1+ cell  Lens: IOL centered and stable    POD1 s/p Phaco/IOL  Appropriate precautions and post op medications reviewed.  Patient instructed to call or come in if symptoms of redness, decreased vision, or pain are experienced.

## 2024-04-01 ENCOUNTER — PATIENT MESSAGE (OUTPATIENT)
Dept: SPINE | Facility: CLINIC | Age: 69
End: 2024-04-01
Payer: MEDICARE

## 2024-04-03 ENCOUNTER — HOSPITAL ENCOUNTER (OUTPATIENT)
Dept: CARDIOLOGY | Facility: OTHER | Age: 69
Discharge: HOME | End: 2024-04-03
Attending: INTERNAL MEDICINE
Payer: MEDICARE

## 2024-04-03 VITALS
BODY MASS INDEX: 30.94 KG/M2 | DIASTOLIC BLOOD PRESSURE: 60 MMHG | WEIGHT: 221 LBS | HEIGHT: 71 IN | SYSTOLIC BLOOD PRESSURE: 113 MMHG

## 2024-04-03 DIAGNOSIS — I50.33 ACUTE ON CHRONIC DIASTOLIC HEART FAILURE: ICD-10-CM

## 2024-04-03 DIAGNOSIS — M51.16 NEURITIS OR RADICULITIS DUE TO RUPTURE OF LUMBAR INTERVERTEBRAL DISC: ICD-10-CM

## 2024-04-03 DIAGNOSIS — M25.552 LEFT HIP PAIN: Primary | ICD-10-CM

## 2024-04-03 LAB
AORTIC ROOT ANNULUS: 3.59 CM
ASCENDING AORTA: 2.77 CM
AV INDEX (PROSTH): 0.85
AV MEAN GRADIENT: 2 MMHG
AV PEAK GRADIENT: 3 MMHG
AV VALVE AREA BY VELOCITY RATIO: 3.08 CM²
AV VALVE AREA: 3.67 CM²
AV VELOCITY RATIO: 0.71
BSA FOR ECHO PROCEDURE: 2.24 M2
CV ECHO LV RWT: 0.52 CM
DOP CALC AO PEAK VEL: 0.9 M/S
DOP CALC AO VTI: 16.9 CM
DOP CALC LVOT AREA: 4.3 CM2
DOP CALC LVOT DIAMETER: 2.35 CM
DOP CALC LVOT PEAK VEL: 0.64 M/S
DOP CALC LVOT STROKE VOLUME: 61.99 CM3
DOP CALCLVOT PEAK VEL VTI: 14.3 CM
E WAVE DECELERATION TIME: 228.55 MSEC
E/A RATIO: 0.86
ECHO LV POSTERIOR WALL: 1.26 CM (ref 0.6–1.1)
FRACTIONAL SHORTENING: 31 % (ref 28–44)
INTERVENTRICULAR SEPTUM: 1.25 CM (ref 0.6–1.1)
LA MAJOR: 5.07 CM
LA MINOR: 5.12 CM
LA WIDTH: 3.5 CM
LAAS-AP2: 17 CM2
LAAS-AP4: 16 CM2
LEFT ATRIUM SIZE: 4.26 CM
LEFT ATRIUM VOLUME INDEX MOD: 20.6 ML/M2
LEFT ATRIUM VOLUME INDEX: 29.3 ML/M2
LEFT ATRIUM VOLUME MOD: 45.38 CM3
LEFT ATRIUM VOLUME: 64.57 CM3
LEFT INTERNAL DIMENSION IN SYSTOLE: 3.36 CM (ref 2.1–4)
LEFT VENTRICLE DIASTOLIC VOLUME INDEX: 50.48 ML/M2
LEFT VENTRICLE DIASTOLIC VOLUME: 111.06 ML
LEFT VENTRICLE MASS INDEX: 109 G/M2
LEFT VENTRICLE SYSTOLIC VOLUME INDEX: 21 ML/M2
LEFT VENTRICLE SYSTOLIC VOLUME: 46.17 ML
LEFT VENTRICULAR INTERNAL DIMENSION IN DIASTOLE: 4.87 CM (ref 3.5–6)
LEFT VENTRICULAR MASS: 238.92 G
LV SEPTAL E/E' RATIO: 9.43 M/S
LVOT MG: 0.93 MMHG
LVOT MV: 0.46 CM/S
MV PEAK A VEL: 0.77 M/S
MV PEAK E VEL: 0.66 M/S
MV STENOSIS PRESSURE HALF TIME: 66.28 MS
MV VALVE AREA P 1/2 METHOD: 3.32 CM2
PV MV: 0.7 M/S
PV PEAK GRADIENT: 4 MMHG
PV PEAK VELOCITY: 0.97 M/S
RA MAJOR: 3.74 CM
RA PRESSURE ESTIMATED: 3 MMHG
RA WIDTH: 3.1 CM
RIGHT VENTRICULAR END-DIASTOLIC DIMENSION: 2.28 CM
SINUS: 2.9 CM
STJ: 2.9 CM
TDI SEPTAL: 0.07 M/S
TRICUSPID ANNULAR PLANE SYSTOLIC EXCURSION: 1.6 CM
Z-SCORE OF LEFT VENTRICULAR DIMENSION IN END DIASTOLE: -4.33
Z-SCORE OF LEFT VENTRICULAR DIMENSION IN END SYSTOLE: -2.42

## 2024-04-03 PROCEDURE — 93306 TTE W/DOPPLER COMPLETE: CPT | Mod: 26,,, | Performed by: INTERNAL MEDICINE

## 2024-04-03 PROCEDURE — 93306 TTE W/DOPPLER COMPLETE: CPT

## 2024-04-04 ENCOUNTER — OFFICE VISIT (OUTPATIENT)
Dept: OPHTHALMOLOGY | Facility: CLINIC | Age: 69
End: 2024-04-04
Payer: MEDICARE

## 2024-04-04 ENCOUNTER — HOSPITAL ENCOUNTER (OUTPATIENT)
Dept: RADIOLOGY | Facility: OTHER | Age: 69
Discharge: HOME | End: 2024-04-04
Attending: INTERNAL MEDICINE
Payer: MEDICARE

## 2024-04-04 DIAGNOSIS — M25.552 LEFT HIP PAIN: ICD-10-CM

## 2024-04-04 DIAGNOSIS — Z98.890 POST-OPERATIVE STATE: Primary | ICD-10-CM

## 2024-04-04 DIAGNOSIS — M51.16 NEURITIS OR RADICULITIS DUE TO RUPTURE OF LUMBAR INTERVERTEBRAL DISC: ICD-10-CM

## 2024-04-04 DIAGNOSIS — H25.12 NUCLEAR SCLEROSIS OF LEFT EYE: ICD-10-CM

## 2024-04-04 PROCEDURE — 1126F AMNT PAIN NOTED NONE PRSNT: CPT | Mod: CPTII,S$GLB,, | Performed by: OPHTHALMOLOGY

## 2024-04-04 PROCEDURE — 3061F NEG MICROALBUMINURIA REV: CPT | Mod: CPTII,S$GLB,, | Performed by: OPHTHALMOLOGY

## 2024-04-04 PROCEDURE — 3288F FALL RISK ASSESSMENT DOCD: CPT | Mod: CPTII,S$GLB,, | Performed by: OPHTHALMOLOGY

## 2024-04-04 PROCEDURE — 73502 X-RAY EXAM HIP UNI 2-3 VIEWS: CPT | Mod: TC,FY,LT

## 2024-04-04 PROCEDURE — 99999 PR PBB SHADOW E&M-EST. PATIENT-LVL IV: CPT | Mod: PBBFAC,,, | Performed by: OPHTHALMOLOGY

## 2024-04-04 PROCEDURE — 3051F HG A1C>EQUAL 7.0%<8.0%: CPT | Mod: CPTII,S$GLB,, | Performed by: OPHTHALMOLOGY

## 2024-04-04 PROCEDURE — 1159F MED LIST DOCD IN RCRD: CPT | Mod: CPTII,S$GLB,, | Performed by: OPHTHALMOLOGY

## 2024-04-04 PROCEDURE — 1101F PT FALLS ASSESS-DOCD LE1/YR: CPT | Mod: CPTII,S$GLB,, | Performed by: OPHTHALMOLOGY

## 2024-04-04 PROCEDURE — 3066F NEPHROPATHY DOC TX: CPT | Mod: CPTII,S$GLB,, | Performed by: OPHTHALMOLOGY

## 2024-04-04 PROCEDURE — 99024 POSTOP FOLLOW-UP VISIT: CPT | Mod: S$GLB,,, | Performed by: OPHTHALMOLOGY

## 2024-04-04 PROCEDURE — 1160F RVW MEDS BY RX/DR IN RCRD: CPT | Mod: CPTII,S$GLB,, | Performed by: OPHTHALMOLOGY

## 2024-04-04 PROCEDURE — 4010F ACE/ARB THERAPY RXD/TAKEN: CPT | Mod: CPTII,S$GLB,, | Performed by: OPHTHALMOLOGY

## 2024-04-04 PROCEDURE — 73502 X-RAY EXAM HIP UNI 2-3 VIEWS: CPT | Mod: 26,LT,, | Performed by: RADIOLOGY

## 2024-04-04 RX ORDER — MOXIFLOXACIN 5 MG/ML
1 SOLUTION/ DROPS OPHTHALMIC
Status: CANCELLED | OUTPATIENT
Start: 2024-04-04

## 2024-04-04 RX ORDER — CYCLOP/TROP/PROPA/PHEN/KET/WAT 1-1-0.1%
1 DROPS (EA) OPHTHALMIC (EYE)
Status: CANCELLED | OUTPATIENT
Start: 2024-04-04

## 2024-04-04 RX ORDER — SODIUM CHLORIDE 0.9 % (FLUSH) 0.9 %
10 SYRINGE (ML) INJECTION
Status: DISCONTINUED | OUTPATIENT
Start: 2024-04-04 | End: 2024-05-07 | Stop reason: HOSPADM

## 2024-04-04 RX ORDER — PHENYLEPHRINE HYDROCHLORIDE 100 MG/ML
1 SOLUTION/ DROPS OPHTHALMIC
Status: CANCELLED | OUTPATIENT
Start: 2024-04-04

## 2024-04-04 NOTE — PROGRESS NOTES
HPI    1 week Post Op OD   No complaints at this time   Denies flashes and floaters       DATE OF SURGERY: 03/28/2024 IMPLANT: [email or web address] 21.0     PGB TID OD       Last edited by Frances Fu on 4/4/2024  3:50 PM.            Assessment /Plan     For exam results, see Encounter Report.    Post-operative state      Slit lamp exam:  L/L: nl  K: clear, wound sealed  AC: trace cell  Iris/Lens: IOL centered and stable    POW1 s/p phaco: Surgery healing well with no signs of infection or abnormal inflammation.    Patient wishes to proceed with surgery in the second eye. Risks, benefits, alternatives reviewed. IOL selection reviewed.     Left eye  IOL: [id number]  20.0

## 2024-04-08 ENCOUNTER — PATIENT MESSAGE (OUTPATIENT)
Dept: CARDIOLOGY | Facility: CLINIC | Age: 69
End: 2024-04-08
Payer: MEDICARE

## 2024-04-09 DIAGNOSIS — M06.9 RHEUMATOID ARTHRITIS FLARE: ICD-10-CM

## 2024-04-09 RX ORDER — PREDNISONE 5 MG/1
10 TABLET ORAL
Qty: 60 TABLET | Refills: 3 | Status: ON HOLD | OUTPATIENT
Start: 2024-04-09 | End: 2024-05-07 | Stop reason: HOSPADM

## 2024-04-10 ENCOUNTER — PATIENT MESSAGE (OUTPATIENT)
Dept: RHEUMATOLOGY | Facility: CLINIC | Age: 69
End: 2024-04-10
Payer: MEDICARE

## 2024-04-10 ENCOUNTER — CLINICAL SUPPORT (OUTPATIENT)
Dept: DIABETES | Facility: CLINIC | Age: 69
End: 2024-04-10
Payer: MEDICARE

## 2024-04-10 ENCOUNTER — PATIENT MESSAGE (OUTPATIENT)
Dept: OPHTHALMOLOGY | Facility: CLINIC | Age: 69
End: 2024-04-10
Payer: MEDICARE

## 2024-04-10 DIAGNOSIS — E11.65 TYPE 2 DIABETES MELLITUS WITH HYPERGLYCEMIA, WITH LONG-TERM CURRENT USE OF INSULIN: Primary | ICD-10-CM

## 2024-04-10 DIAGNOSIS — Z79.4 TYPE 2 DIABETES MELLITUS WITH HYPERGLYCEMIA, WITH LONG-TERM CURRENT USE OF INSULIN: Primary | ICD-10-CM

## 2024-04-10 PROCEDURE — G0108 DIAB MANAGE TRN  PER INDIV: HCPCS | Mod: 95,,, | Performed by: FAMILY MEDICINE

## 2024-04-11 ENCOUNTER — OFFICE VISIT (OUTPATIENT)
Dept: SPINE | Facility: CLINIC | Age: 69
End: 2024-04-11
Payer: MEDICARE

## 2024-04-11 VITALS
DIASTOLIC BLOOD PRESSURE: 59 MMHG | SYSTOLIC BLOOD PRESSURE: 111 MMHG | BODY MASS INDEX: 30.92 KG/M2 | WEIGHT: 220.88 LBS | HEART RATE: 99 BPM | OXYGEN SATURATION: 100 % | RESPIRATION RATE: 18 BRPM | HEIGHT: 71 IN

## 2024-04-11 DIAGNOSIS — M54.16 LUMBAR RADICULOPATHY: ICD-10-CM

## 2024-04-11 DIAGNOSIS — S22.080A CLOSED WEDGE COMPRESSION FRACTURE OF T11 VERTEBRA, INITIAL ENCOUNTER: Primary | ICD-10-CM

## 2024-04-11 DIAGNOSIS — S32.030A COMPRESSION FRACTURE OF L3 VERTEBRA, INITIAL ENCOUNTER: ICD-10-CM

## 2024-04-11 PROCEDURE — 1160F RVW MEDS BY RX/DR IN RCRD: CPT | Mod: CPTII,S$GLB,, | Performed by: STUDENT IN AN ORGANIZED HEALTH CARE EDUCATION/TRAINING PROGRAM

## 2024-04-11 PROCEDURE — 99213 OFFICE O/P EST LOW 20 MIN: CPT | Mod: S$GLB,,, | Performed by: STUDENT IN AN ORGANIZED HEALTH CARE EDUCATION/TRAINING PROGRAM

## 2024-04-11 PROCEDURE — 3078F DIAST BP <80 MM HG: CPT | Mod: CPTII,S$GLB,, | Performed by: STUDENT IN AN ORGANIZED HEALTH CARE EDUCATION/TRAINING PROGRAM

## 2024-04-11 PROCEDURE — 3074F SYST BP LT 130 MM HG: CPT | Mod: CPTII,S$GLB,, | Performed by: STUDENT IN AN ORGANIZED HEALTH CARE EDUCATION/TRAINING PROGRAM

## 2024-04-11 PROCEDURE — 1159F MED LIST DOCD IN RCRD: CPT | Mod: CPTII,S$GLB,, | Performed by: STUDENT IN AN ORGANIZED HEALTH CARE EDUCATION/TRAINING PROGRAM

## 2024-04-11 PROCEDURE — 99999 PR PBB SHADOW E&M-EST. PATIENT-LVL V: CPT | Mod: PBBFAC,,, | Performed by: STUDENT IN AN ORGANIZED HEALTH CARE EDUCATION/TRAINING PROGRAM

## 2024-04-11 PROCEDURE — 4010F ACE/ARB THERAPY RXD/TAKEN: CPT | Mod: CPTII,S$GLB,, | Performed by: STUDENT IN AN ORGANIZED HEALTH CARE EDUCATION/TRAINING PROGRAM

## 2024-04-11 PROCEDURE — 3061F NEG MICROALBUMINURIA REV: CPT | Mod: CPTII,S$GLB,, | Performed by: STUDENT IN AN ORGANIZED HEALTH CARE EDUCATION/TRAINING PROGRAM

## 2024-04-11 PROCEDURE — 3008F BODY MASS INDEX DOCD: CPT | Mod: CPTII,S$GLB,, | Performed by: STUDENT IN AN ORGANIZED HEALTH CARE EDUCATION/TRAINING PROGRAM

## 2024-04-11 PROCEDURE — 3066F NEPHROPATHY DOC TX: CPT | Mod: CPTII,S$GLB,, | Performed by: STUDENT IN AN ORGANIZED HEALTH CARE EDUCATION/TRAINING PROGRAM

## 2024-04-11 PROCEDURE — 3051F HG A1C>EQUAL 7.0%<8.0%: CPT | Mod: CPTII,S$GLB,, | Performed by: STUDENT IN AN ORGANIZED HEALTH CARE EDUCATION/TRAINING PROGRAM

## 2024-04-11 PROCEDURE — 1125F AMNT PAIN NOTED PAIN PRSNT: CPT | Mod: CPTII,S$GLB,, | Performed by: STUDENT IN AN ORGANIZED HEALTH CARE EDUCATION/TRAINING PROGRAM

## 2024-04-11 PROCEDURE — 1101F PT FALLS ASSESS-DOCD LE1/YR: CPT | Mod: CPTII,S$GLB,, | Performed by: STUDENT IN AN ORGANIZED HEALTH CARE EDUCATION/TRAINING PROGRAM

## 2024-04-11 PROCEDURE — 3288F FALL RISK ASSESSMENT DOCD: CPT | Mod: CPTII,S$GLB,, | Performed by: STUDENT IN AN ORGANIZED HEALTH CARE EDUCATION/TRAINING PROGRAM

## 2024-04-11 NOTE — PROGRESS NOTES
Chronic patient Established Note (Follow up visit)      SUBJECTIVE:    INTERVAL HISTORY 4/11/2024:  Nithin Wagner presents to the clinic for a follow-up appointment for chronic pain. Current pain intensity is 10/10.  Since the last visit, Nithin Wagner states:  he continues to have mid back and lower back pain. He also has sciatica pain, but feels the axial lower back pain tends to be worst.  It continues to affect his quality of life.  He is scheduled to have a kyphoplasty next Monday.    PRIOR HISTORY:   Nithin Wagner presents to the clinic for the evaluation of mid back, low back, and bilateral legs pain. The pain started 5 weeks ago following holding objects and adjusting to catch one from slipping and symptoms have been worsening.  He reports that he went to urgent care at that time and was given flexeril with no improvement. The pain is located in the mid back, low back area and radiates to the bilateral shooting leg pain. The thoracic pain is constant and continues to bother him.  He feels he is able to control the sciatica pain with oral medications.  The pain is described as sharp and shooting and is rated as 7/10. The pain is rated with a score of  4/10 on the BEST day and a score of 10/10 on the WORST day.  Symptoms interfere with daily activity and sleeping. The pain is exacerbated by moving around, trying to stand up, taking more than a few steps.  The pain is mitigated by laying down and medications. The patient reports 1.5 hours of uninterrupted sleep per night.    Patient denies urinary incontinence and bowel incontinence. He admits to weakness in his legs. He reports if he bends his left knee, he will collapse.  He was recently diagnosed with osteoporosis. He has a history of rheumatoid arthritis, 3 stents, PE, right atrial myxoma, polycythemia. He has been on high doses of prednisone and has been trying to wean off of this, but his arthritic pain gets worse.    Physical Therapy/Home Exercise: no       Pain Medications:   - Norco 10-325mg (takes after 4-5 hours)   - Dilaudid 4mg (takes in the AM)   - gabapentin 300mg (doesn't take it - due to risk of side effects)     report:  Reviewed and consistent with medication use as prescribed.      Pain Procedures:       Imaging:   MRI LUMBAR SPINE WITHOUT CONTRAST     CLINICAL HISTORY:  Lumbar radiculopathy, no red flags, no prior management; Radiculopathy, lumbar region     TECHNIQUE:  Multiplanar, multisequence MR images were acquired from the thoracolumbar junction to the sacrum without the administration of contrast.     COMPARISON:  Radiographs 03/01/2024.     FINDINGS:  Lumbar spine alignment demonstrates dextroscoliosis with mild grade 1 retrolisthesis of L3 on L4.  No spondylolysis.  Prominent marrow edema at the inferior aspect of the L3 vertebral body with a superimposed linear hypointense band and associated irregularity and mild height loss of the inferior endplate.  Remaining vertebral body heights are well maintained.  No marrow signal abnormality to suggest an infiltrative process.     Multilevel degenerative disc space narrowing and desiccation most pronounced from L2-L3 through L4-L5.     Distal spinal cord demonstrates normal contour and signal intensity.  Cauda equina appears normal without findings to suggest arachnoiditis.  Conus medullaris terminates at L2.     Bilateral cortical renal cysts.  SI joints are symmetric.  Paraspinal musculature demonstrates normal bulk and signal intensity.     T12-L1: Prominent epidural fat.  No spinal canal stenosis.  No neural foraminal narrowing.     L1-L2: Left facet arthropathy and bilateral ligamentum flavum hypertrophy.  Prominent epidural fat.  No spinal canal stenosis.  No neural foraminal narrowing.     L2-L3: Circumferential disc bulge causes mass effect on the ventral thecal sac and lateral recesses and demonstrates a superimposed right foraminal/extraforaminal broad-based protrusion.  Bilateral  facet arthropathy and bilateral ligamentum flavum hypertrophy.  Prominent epidural fat.  Findings contribute to mild right neural foraminal narrowing.  No spinal canal stenosis.     L3-L4: Circumferential disc bulge causes mass effect on the ventral thecal sac and lateral recesses and encroaches into the bilateral foraminal zones.  Bilateral facet arthropathy and bilateral ligamentum flavum hypertrophy.  Prominent epidural fat.  Findings contribute to complete effacement of the thecal sac and moderate left and mild right neural foraminal narrowing.     L4-L5: Circumferential disc bulge causes mass effect on the ventral thecal sac and lateral recesses and encroaches into the bilateral foraminal zones.  Bilateral facet arthropathy and bilateral ligamentum flavum hypertrophy.  Prominent epidural fat.  Findings contribute to complete effacement of the thecal sac and severe left and mild right neural foraminal narrowing.     L5-S1: Posterior broad-based disc bulge.  Bilateral facet arthropathy.  Prominent epidural fat.  No spinal canal stenosis.  Mild right neural foraminal narrowing.     Impression:     1. Recent compression fracture of the L3 inferior endplate with mild height loss and moderate associated marrow edema.  No osseous retropulsion.  No epidural fluid collection.  2. Multilevel lumbar spondylosis and prominent epidural lipomatosis contributing to complete effacement of the thecal sac at L3-L4 and L4-L5 and multilevel neural foraminal narrowing most pronounced at L4-L5.  This report was flagged in Epic as abnormal.        Electronically signed by: Leeroy Bruce MD  Date:                                            03/24/2024  Time:                                           06:36    MRI THORACIC SPINE WITHOUT CONTRAST     CLINICAL HISTORY:  Mid-back pain;  Pain in thoracic spine     TECHNIQUE:  Multiplanar MR imaging of the thoracic spine was performed without the use of intravenous contrast      COMPARISON:  CT abdomen and pelvis 11/15/2022     FINDINGS:  Alignment: The thoracic spine demonstrates proper alignment.     Vertebra: Progressive height loss along the superior endplate of T11 now estimated at 60% (previously 20%).     Disc: Mild multilevel disc space narrowing.     Cord: The demonstrated portion of the spinal cord is normal in signal intensity at all levels with no indication of myelomalacia or cord edema.     Degenerative findings: Prominent posterior epidural fat deforming the thecal sac.  Complete effacement of the thecal sac beginning at T5-6 and extending through T 7-8 as well as at T10-11.  Mild mass effect upon the thoracic spinal cord at these levels.  No focal disc abnormality.  No neural foraminal narrowing.     Focus of T2 signal hyperintensity right kidney shown to represent a cyst by prior CT.     Impression:     Progressive height loss at T11 compared to CT November 2022.  Linear STIR signal abnormality along the superior endplate suggesting recent height loss.     Prominent epidural lipomatosis resulting in complete effacement of the thecal sac from T5-6 through T7-8 and T10-11 with mild mass effect upon the thoracic spinal cord at these levels.  No cord signal abnormality identified.        Electronically signed by: Johana Reynoso  Date:                                            03/25/2024  Time:                                           08:24    XR LUMBAR SPINE AP AND LATERAL     CLINICAL HISTORY:  Vertebrogenic low back pain     FINDINGS:  LUMBAR SPINE TWO VIEWS: THERE IS A T11 COMPRESSION FRACTURE THAT MAY HAVE PROGRESSED WHEN COMPARED TO THE PRIOR STUDY.  THERE IS BASELINE DJD AND DISH.     Impression:     T11 COMPRESSION FRACTURE MAY HAVE PROGRESSED FROM THE PRIOR STUDY.        Electronically signed by: Jace Ching MD  Date:                                            03/01/2024  Time:                                           13:03    XR THORACIC SPINE AP LATERAL    "  CLINICAL HISTORY:  Vertebrogenic low back pain     FINDINGS:  Thoracic spine two views: There is mild DJD.  There is dish.  There is a T11 compression deformity which was seen on the CT study dated 11/15/2022.  This may have progressed when compared to the prior study dated 11/15/2022.     Impression:     T11 compression deformity may have progressed from the prior CT.     DJD and dish.        Electronically signed by: Jace Ching MD  Date:                                            03/01/2024  Time:                                           13:02    Pain Disability Index Review:      3/14/2024    12:55 PM   Last 3 PDI Scores   Pain Disability Index (PDI) 40       Allergies:   Review of patient's allergies indicates:   Allergen Reactions    Enbrel [etanercept] Shortness Of Breath     CHF    Nsaids (non-steroidal anti-inflammatory drug) Other (See Comments)     hypertention  Other reaction(s): Other (See Comments)  hypertention  HTN  Patient states he tolerates anti inflammatory eye drops    Statins-hmg-coa reductase inhibitors Other (See Comments)     Heart arrythemias  Other reaction(s): Other (See Comments)  Heart arrythemias  Joint pain and cardiac arrythmias    Pcn [penicillins] Rash       Current Medications:   Current Outpatient Medications   Medication Sig Dispense Refill    aspirin 81 MG Chew Take 81 mg by mouth.      BD ULTRA-FINE RAHUL PEN NEEDLE 32 gauge x 5/32" Ndle To use 6 daily 400 each 3    carvediloL (COREG) 6.25 MG tablet Take 1 tablet (6.25 mg total) by mouth 2 (two) times daily with meals. 180 tablet 3    digoxin (LANOXIN) 125 mcg tablet TAKE 1 TABLET BY MOUTH ONCE DAILY. 90 tablet 3    diltiaZEM (CARDIZEM CD) 120 MG Cp24 Take 120 mg by mouth Daily.      ergocalciferol (ERGOCALCIFEROL) 50,000 unit Cap Take 1 capsule (50,000 Units total) by mouth every 7 days. 12 capsule 3    EScitalopram oxalate (LEXAPRO) 20 MG tablet Take 20 mg by mouth.      furosemide (LASIX) 80 MG tablet Take 1 tablet (80 " mg total) by mouth 2 (two) times a day. 180 tablet 3    gabapentin (NEURONTIN) 300 MG capsule       HYDROcodone-acetaminophen (NORCO)  mg per tablet TAKE 1 TAB BY MOUTH EVERY 6 HOURS AS NEEDED FOR PAIN, GREATER THAN 7 DAYS MEDICALLY NECESSARY. STOP TAKING NORCO 5/325 120 tablet 0    HYDROcodone-acetaminophen (NORCO)  mg per tablet Take 1 tablet by mouth every 6 (six) hours as needed. GREATER THAN 7 DAYS MEDICALLY NECESSARY 120 tablet 0    HYDROcodone-acetaminophen (NORCO)  mg per tablet TAKE 1 tablet by mouth every 6 hours as needed for  PAIN, GREATER THAN 7 DAYS MEDICALLY NECESSARY 120 tablet 0    HYDROmorphone (DILAUDID) 4 MG tablet TAKE ONE (1) TABLET BY MOUTH EVERY EIGHT(8) HOURS AS NEEDED FOR PAIN. ONLY FOR SHORT TERM USE. - DO NOT TAKE WITH NORCO 12 tablet 0    HYDROmorphone (DILAUDID) 4 MG tablet TAKE 1½ Tablets by mouth EVERY 6 HOURS as needed for SEVERE PAIN 60 tablet 0    HYDROmorphone (DILAUDID) 4 MG tablet Take 1.5 tablets (6 mg total) by mouth every 6 (six) hours as needed for severe pain. 60 tablet 0    insulin aspart, niacinamide, (FIASP PENFILL U-100 INSULIN) 100 unit/mL (3 mL) Crtg pen To use with inPen; 140 max daily dose. 14 pen 11    insulin degludec (TRESIBA FLEXTOUCH U-200) 200 unit/mL (3 mL) insulin pen Inject 60 Units into the skin once daily. Cartridge for novoecho 3 pen 11    ketoconazole (NIZORAL) 2 % cream Apply to affected area DAILY 30 g 3    lamiVUDine (EPIVIR) 150 MG Tab TAKE 1 TABLET BY MOUTH EVERY DAY 30 tablet 23    leflunomide (ARAVA) 10 MG Tab TAKE 1 TABLET BY MOUTH EVERY DAY 30 tablet 0    metoprolol tartrate (LOPRESSOR) 50 MG tablet Take 50 mg by mouth every 12 (twelve) hours.      moxifloxacin (VIGAMOX) 0.5 % ophthalmic solution Place 1 drop into the right eye 3 (three) times daily. 3 mL 3    multivitamin (THERAGRAN) per tablet Take 1 tablet by mouth once daily.      mupirocin (BACTROBAN) 2 % ointment Apply to affected area 3 times daily 22 g 1     nitroGLYCERIN (NITROSTAT) 0.4 MG SL tablet PLACE 1 TABLET UNDER THE TONGUE EVERY 5 MINUTES AS NEEDED FOR CHEST PAIN. 100 tablet 2    olmesartan (BENICAR) 40 MG tablet TAKE 1 TABLET BY MOUTH EVERY DAY AT NIGHT 90 tablet 3    prednisoLONE acetate (PRED FORTE) 1 % DrpS Place 1 drop into the right eye 3 (three) times daily. 10 mL 3    predniSONE (DELTASONE) 2.5 MG tablet TAKE 5 TABLETS (12.5 MG TOTAL) BY MOUTH ONCE DAILY. 150 tablet 2    predniSONE (DELTASONE) 5 MG tablet TAKE 2 TABLETS BY MOUTH ONCE DAILY. 60 tablet 3    PROAIR HFA 90 mcg/actuation inhaler       sulfamethoxazole-trimethoprim 800-160mg (BACTRIM DS) 800-160 mg Tab Take 1 tablet by mouth 2 (two) times daily. 14 tablet 0    tirzepatide 7.5 mg/0.5 mL PnIj Inject 7.5 mg into the skin every 7 days. 4 Pen 11    tiZANidine (ZANAFLEX) 4 MG tablet TAKE 1 TABLET BY MOUTH EVERY 8 HOURS AS NEEDED FOR PAIN 30 tablet 2    tocilizumab 162 mg/0.9 mL PnIj Inject 162 mg into the skin once a week. 4 mL 0    tocilizumab 162 mg/0.9 mL PnIj Inject 162 mg into the skin once a week. 4 mL 6    folic acid (FOLVITE) 1 MG tablet Take 1 tablet (1 mg total) by mouth once daily. 30 tablet 5    nystatin (MYCOSTATIN) powder Apply topically 4 (four) times daily. Apply to intertriginous areas as directed up to four times daily to reduce moisture. for 14 days 30 g 0     Current Facility-Administered Medications   Medication Dose Route Frequency Provider Last Rate Last Admin    sodium chloride 0.9% flush 10 mL  10 mL Intravenous PRN Hans Woodward MD           REVIEW OF SYSTEMS:  GENERAL:  current fevers or chills, recent use of antibiotics denies.  HEENT:  History of migraines/headaches: denies, History of major throat surgery: denies  RESPIRATORY:  History of home oxygen or pulmonary hypertension/severe breathing dysfunction: reports right pneumonectomy and upper lobectomy  CARDIOVASCULAR:  Hx of palpitations/rhythm problems: denies Hx of Heart Attacks/Surgery: reports prior 3 stents,  history right atrial myxoma  GI:  Recent abdominal discomfort or recent change in bowel habits reports abdominal pain (also has constipation)  MUSCULOSKELETAL:  See HPI.  SKIN:  unhealed wounds or rashes: reports scrotal yeast infection and skin tears related to prednisone  PSYCH: major psychiatric history or recent psychosocial stressors reports occasional suicidal thoughts  HEMATOLOGY/LYMPHOLOGY:  Hx of prolonged bleeding, Hx of Blood thinner usage: reports ASA 81mg ; reason: stents  NEURO:   history of seizures, strokes, chronic/old weakness (such as paralysis or paresis of any body part): reports TIA during period of myxoma (no deficits)  All other reviewed and negative other than HPI.    Past Medical History:  Past Medical History:   Diagnosis Date    Arthritis     Atrial myxoma     Coronary atherosclerosis     Diabetes mellitus, type 2     Difficult intubation     Heart failure     Hepatitis B     Hyperlipidemia     Hypertension     Non-alcoholic fatty liver disease     Rheumatoid arthritis     Rheumatoid arthritis flare 07/12/2021    Squamous cell carcinoma of forehead 10/26/2023    forehead    Stroke     TIA       Past Surgical History:  Past Surgical History:   Procedure Laterality Date    CATARACT EXTRACTION W/  INTRAOCULAR LENS IMPLANT Right 3/28/2024    Procedure: EXTRACTION, CATARACT, WITH IOL INSERTION;  Surgeon: Hans Woodward MD;  Location: [facility] OR;  Service: Ophthalmology;  Laterality: Right;    CORONARY ANGIOGRAPHY N/A 3/10/2021    Procedure: ANGIOGRAM, CORONARY ARTERY - right radial;  Surgeon: Shemar Dempsey MD;  Location: [facility] CATH LAB;  Service: Cardiology;  Laterality: N/A;    CORONARY STENT PLACEMENT  03/10/2021    prox-mid RCA [place] 4.5 x 26 mm, 4.5 x 12 mm    INCISION AND DRAINAGE OF SCROTUM N/A 11/15/2022    Procedure: INCISION AND DRAINAGE, SCROTUM;  Surgeon: William Hatch MD;  Location: [facility] OR;  Service: Urology;  Laterality: N/A;    INCISION AND DRAINAGE OF SCROTUM N/A 11/17/2022     Procedure: INCISION AND DRAINAGE, SCROTUM;  Surgeon: William Hatch MD;  Location: [facility];  Service: Urology;  Laterality: N/A;    LUNG LOBECTOMY Right 2008    RUL lobectomy after removal of atrial myxoma    PLEURA BIOPSY      RESECTION OF ATRIAL MYXOMA  2007       Family History:  Family History   Problem Relation Age of Onset    Hodgkin's lymphoma Mother     Diabetes type II Mother     Kidney failure Father     Diabetes type I Father     Cancer Sister        Social History:  Social History     Socioeconomic History    Marital status: Single   Occupational History    Occupation: , respiratory therapist, founded [employer]     Comment: Retired   Tobacco Use    Smoking status: Every Day     Current packs/day: 1.00     Average packs/day: 1 pack/day for 35.0 years (35.0 ttl pk-yrs)     Types: Cigarettes    Smokeless tobacco: Never   Substance and Sexual Activity    Alcohol use: Not Currently    Drug use: No    Sexual activity: Never     Social Determinants of Health     Financial Resource Strain: Low Risk  (2/20/2024)    Overall Financial Resource Strain (CARDIA)     Difficulty of Paying Living Expenses: Not very hard   Recent Concern: Financial Resource Strain - Medium Risk (11/24/2023)    Overall Financial Resource Strain (CARDIA)     Difficulty of Paying Living Expenses: Somewhat hard   Food Insecurity: No Food Insecurity (2/20/2024)    Hunger Vital Sign     Worried About Running Out of Food in the Last Year: Never true     Ran Out of Food in the Last Year: Never true   Recent Concern: Food Insecurity - Food Insecurity Present (11/24/2023)    Hunger Vital Sign     Worried About Running Out of Food in the Last Year: Sometimes true     Ran Out of Food in the Last Year: Patient declined   Transportation Needs: No Transportation Needs (2/20/2024)    PRAPARE - Transportation     Lack of Transportation (Medical): No     Lack of Transportation (Non-Medical): No   Recent Concern: Transportation  "Needs - Unmet Transportation Needs (11/24/2023)    PRAPARE - Transportation     Lack of Transportation (Medical): Yes     Lack of Transportation (Non-Medical): Yes   Physical Activity: Insufficiently Active (2/20/2024)    Exercise Vital Sign     Days of Exercise per Week: 1 day     Minutes of Exercise per Session: 10 min   Stress: No Stress Concern Present (2/20/2024)    [facility] [employer] [facility] - Occupational Stress Questionnaire     Feeling of Stress : Not at all   Recent Concern: Stress - Stress Concern Present (11/24/2023)    [facility] of Occupational [facility] - Occupational Stress Questionnaire     Feeling of Stress : To some extent   Social Connections: Unknown (11/24/2023)    Social Connection and Isolation Panel [NHANES]     Frequency of Communication with Friends and Family: More than three times a week     Frequency of Social Gatherings with Friends and Family: Once a week     Active Member of Clubs or Organizations: Yes     Attends Club or Organization Meetings: More than 4 times per year     Marital Status:    Housing Stability: Low Risk  (2/20/2024)    Housing Stability Vital Sign     Unable to Pay for Housing in the Last Year: No     Number of Places Lived in the Last Year: 1     Unstable Housing in the Last Year: No   Recent Concern: Housing Stability - High Risk (11/24/2023)    Housing Stability Vital Sign     Unable to Pay for Housing in the Last Year: Yes     Number of Places Lived in the Last Year: 1     Unstable Housing in the Last Year: No       OBJECTIVE:    BP (!) 111/59 (BP Location: Left arm, Patient Position: Sitting, BP Method: Medium (Automatic))   Pulse 99   Resp 18   Ht 5' 11" (1.803 m)   Wt 100.2 kg (220 lb 14.4 oz)   SpO2 100%   BMI 30.81 kg/m²     PHYSICAL EXAMINATION:  General appearance: Well appearing, in no acute distress, alert and appropriately communicative.  Psych:  Mood and affect appropriate.  Skin: Skin color, texture, turgor normal, no " rashes or lesions, in both upper and lower body for exposed skin.  Head/face:  Atraumatic, normocephalic.  Cor: regular rate  Pulm: non-labored breathing  GI: Abdomen non-distended and non-tender.  Back: Straight leg raising in the sitting and supine positions is negative to radicular pain.  pain to palpation over the spine and/or paraspinal muscles. Pain with lumbar flexion. Pain to percussion just under rib cage.  Extremities: No deformities, significant lymphedema, or skin discoloration. No significant open wounds. No major amputations.  Musculoskeletal:  Bilateral upper and lower extremity strength is normal and symmetric with the exception of left IP 4/5.  No atrophy or tone abnormalities are noted.  Neuro: Bilateral upper and lower extremity coordination and muscle stretch reflexes abnormalities noted:.  Brewer and/or Clonus: none; loss of sensation to light touch: none  Gait: walker    CMP  Sodium   Date Value Ref Range Status   04/03/2024 142 136 - 145 mmol/L Final     Potassium   Date Value Ref Range Status   04/03/2024 4.1 3.5 - 5.1 mmol/L Final     Chloride   Date Value Ref Range Status   04/03/2024 100 95 - 110 mmol/L Final     CO2   Date Value Ref Range Status   04/03/2024 30 (H) 23 - 29 mmol/L Final     Carbon Monoxide, Blood   Date Value Ref Range Status   11/04/2021 11 (H) % Final     Comment:     -------------------REFERENCE VALUE--------------------------  0-2 Normal (Non-smoker) , < or = 9 Normal (Smoker), > or   = 20 (Toxic concentration)    Test Performed by:  [facility]  [address] [place] [address]  : Demetrius Saldana M.D. Ph.D.; CLIA# [id number]       Glucose   Date Value Ref Range Status   04/03/2024 181 (H) 70 - 110 mg/dL Final     BUN   Date Value Ref Range Status   04/03/2024 16 8 - 23 mg/dL Final     Creatinine   Date Value Ref Range Status   04/03/2024 1.1 0.5 - 1.4 mg/dL Final     Calcium   Date Value Ref Range  Status   04/03/2024 9.3 8.7 - 10.5 mg/dL Final     Total Protein   Date Value Ref Range Status   03/01/2024 6.3 6.0 - 8.4 g/dL Final     Albumin   Date Value Ref Range Status   03/01/2024 3.4 (L) 3.5 - 5.2 g/dL Final   06/21/2022 4.1 3.6 - 5.1 g/dL Final     Total Bilirubin   Date Value Ref Range Status   03/01/2024 0.6 0.1 - 1.0 mg/dL Final     Comment:     For infants and newborns, interpretation of results should be based  on gestational age, weight and in agreement with clinical  observations.    Premature Infant recommended reference ranges:  Up to 24 hours.............<8.0 mg/dL  Up to 48 hours............<12.0 mg/dL  3-5 days..................<15.0 mg/dL  6-29 days.................<15.0 mg/dL       Alkaline Phosphatase   Date Value Ref Range Status   03/01/2024 102 55 - 135 U/L Final     AST   Date Value Ref Range Status   03/01/2024 24 10 - 40 U/L Final     ALT   Date Value Ref Range Status   03/01/2024 52 (H) 10 - 44 U/L Final     Anion Gap   Date Value Ref Range Status   04/03/2024 12 8 - 16 mmol/L Final     eGFR   Date Value Ref Range Status   04/03/2024 >60 >60 mL/min/1.73 m^2 Final     ASSESSMENT: 68 y.o. year old male with mid back and lower back pain, consistent with:     1. Closed wedge compression fracture of T11 vertebra, initial encounter        2. Compression fracture of L3 vertebra, initial encounter        3. Lumbar radiculopathy            IMPRESSION: Nithin Wagner presents today for mid back and lower back pain. History and physical exam are consistent with axial mid back and lower back pain and lumbar radiculopathy.  Imaging is consistent with  compression fracture of T11 and L3.  He is scheduled to have a T11 and L3 kyphoplasty on 4/22/2024.    PLAN:   - I have stressed the importance of physical activity and a home exercise plan to help with pain and improve health.  - Patient can continue with medications for now since they are providing benefits, using them appropriately, and without  side effects.  - MRI reviewed with Mr. Wagner  - we discussed plan of care regarding his compression fractures and plan of care concerning his lumbar radiculopathy  - we can plan for an epidural injection once his compression fractures have been treated.  - he is scheduled to meet with anesthesia for optimization tomorrow  - RTC after kyphoplasty  - Counseled patient regarding the importance of activity modification and physical therapy.    The above plan and management options were discussed at length with patient. Patient is in agreement with the above and verbalized understanding.    Stefan [id number]  04/11/2024

## 2024-04-11 NOTE — H&P (VIEW-ONLY)
Chronic patient Established Note (Follow up visit)      SUBJECTIVE:    INTERVAL HISTORY 4/11/2024:  Nithin Wagner presents to the clinic for a follow-up appointment for chronic pain. Current pain intensity is 10/10.  Since the last visit, Nithin Wagner states:  he continues to have mid back and lower back pain. He also has sciatica pain, but feels the axial lower back pain tends to be worst.  It continues to affect his quality of life.  He is scheduled to have a kyphoplasty next Monday.    PRIOR HISTORY:   Nithin Wagner presents to the clinic for the evaluation of mid back, low back, and bilateral legs pain. The pain started 5 weeks ago following holding objects and adjusting to catch one from slipping and symptoms have been worsening.  He reports that he went to urgent care at that time and was given flexeril with no improvement. The pain is located in the mid back, low back area and radiates to the bilateral shooting leg pain. The thoracic pain is constant and continues to bother him.  He feels he is able to control the sciatica pain with oral medications.  The pain is described as sharp and shooting and is rated as 7/10. The pain is rated with a score of  4/10 on the BEST day and a score of 10/10 on the WORST day.  Symptoms interfere with daily activity and sleeping. The pain is exacerbated by moving around, trying to stand up, taking more than a few steps.  The pain is mitigated by laying down and medications. The patient reports 1.5 hours of uninterrupted sleep per night.    Patient denies urinary incontinence and bowel incontinence. He admits to weakness in his legs. He reports if he bends his left knee, he will collapse.  He was recently diagnosed with osteoporosis. He has a history of rheumatoid arthritis, 3 stents, PE, right atrial myxoma, polycythemia. He has been on high doses of prednisone and has been trying to wean off of this, but his arthritic pain gets worse.    Physical Therapy/Home Exercise: no       Pain Medications:   - Norco 10-325mg (takes after 4-5 hours)   - Dilaudid 4mg (takes in the AM)   - gabapentin 300mg (doesn't take it - due to risk of side effects)     report:  Reviewed and consistent with medication use as prescribed.      Pain Procedures:       Imaging:   MRI LUMBAR SPINE WITHOUT CONTRAST     CLINICAL HISTORY:  Lumbar radiculopathy, no red flags, no prior management; Radiculopathy, lumbar region     TECHNIQUE:  Multiplanar, multisequence MR images were acquired from the thoracolumbar junction to the sacrum without the administration of contrast.     COMPARISON:  Radiographs 03/01/2024.     FINDINGS:  Lumbar spine alignment demonstrates dextroscoliosis with mild grade 1 retrolisthesis of L3 on L4.  No spondylolysis.  Prominent marrow edema at the inferior aspect of the L3 vertebral body with a superimposed linear hypointense band and associated irregularity and mild height loss of the inferior endplate.  Remaining vertebral body heights are well maintained.  No marrow signal abnormality to suggest an infiltrative process.     Multilevel degenerative disc space narrowing and desiccation most pronounced from L2-L3 through L4-L5.     Distal spinal cord demonstrates normal contour and signal intensity.  Cauda equina appears normal without findings to suggest arachnoiditis.  Conus medullaris terminates at L2.     Bilateral cortical renal cysts.  SI joints are symmetric.  Paraspinal musculature demonstrates normal bulk and signal intensity.     T12-L1: Prominent epidural fat.  No spinal canal stenosis.  No neural foraminal narrowing.     L1-L2: Left facet arthropathy and bilateral ligamentum flavum hypertrophy.  Prominent epidural fat.  No spinal canal stenosis.  No neural foraminal narrowing.     L2-L3: Circumferential disc bulge causes mass effect on the ventral thecal sac and lateral recesses and demonstrates a superimposed right foraminal/extraforaminal broad-based protrusion.  Bilateral  facet arthropathy and bilateral ligamentum flavum hypertrophy.  Prominent epidural fat.  Findings contribute to mild right neural foraminal narrowing.  No spinal canal stenosis.     L3-L4: Circumferential disc bulge causes mass effect on the ventral thecal sac and lateral recesses and encroaches into the bilateral foraminal zones.  Bilateral facet arthropathy and bilateral ligamentum flavum hypertrophy.  Prominent epidural fat.  Findings contribute to complete effacement of the thecal sac and moderate left and mild right neural foraminal narrowing.     L4-L5: Circumferential disc bulge causes mass effect on the ventral thecal sac and lateral recesses and encroaches into the bilateral foraminal zones.  Bilateral facet arthropathy and bilateral ligamentum flavum hypertrophy.  Prominent epidural fat.  Findings contribute to complete effacement of the thecal sac and severe left and mild right neural foraminal narrowing.     L5-S1: Posterior broad-based disc bulge.  Bilateral facet arthropathy.  Prominent epidural fat.  No spinal canal stenosis.  Mild right neural foraminal narrowing.     Impression:     1. Recent compression fracture of the L3 inferior endplate with mild height loss and moderate associated marrow edema.  No osseous retropulsion.  No epidural fluid collection.  2. Multilevel lumbar spondylosis and prominent epidural lipomatosis contributing to complete effacement of the thecal sac at L3-L4 and L4-L5 and multilevel neural foraminal narrowing most pronounced at L4-L5.  This report was flagged in Epic as abnormal.        Electronically signed by: Leeroy Bruce MD  Date:                                            03/24/2024  Time:                                           06:36    MRI THORACIC SPINE WITHOUT CONTRAST     CLINICAL HISTORY:  Mid-back pain;  Pain in thoracic spine     TECHNIQUE:  Multiplanar MR imaging of the thoracic spine was performed without the use of intravenous contrast      COMPARISON:  CT abdomen and pelvis 11/15/2022     FINDINGS:  Alignment: The thoracic spine demonstrates proper alignment.     Vertebra: Progressive height loss along the superior endplate of T11 now estimated at 60% (previously 20%).     Disc: Mild multilevel disc space narrowing.     Cord: The demonstrated portion of the spinal cord is normal in signal intensity at all levels with no indication of myelomalacia or cord edema.     Degenerative findings: Prominent posterior epidural fat deforming the thecal sac.  Complete effacement of the thecal sac beginning at T5-6 and extending through T 7-8 as well as at T10-11.  Mild mass effect upon the thoracic spinal cord at these levels.  No focal disc abnormality.  No neural foraminal narrowing.     Focus of T2 signal hyperintensity right kidney shown to represent a cyst by prior CT.     Impression:     Progressive height loss at T11 compared to CT November 2022.  Linear STIR signal abnormality along the superior endplate suggesting recent height loss.     Prominent epidural lipomatosis resulting in complete effacement of the thecal sac from T5-6 through T7-8 and T10-11 with mild mass effect upon the thoracic spinal cord at these levels.  No cord signal abnormality identified.        Electronically signed by: Johana Reynoso  Date:                                            03/25/2024  Time:                                           08:24    XR LUMBAR SPINE AP AND LATERAL     CLINICAL HISTORY:  Vertebrogenic low back pain     FINDINGS:  LUMBAR SPINE TWO VIEWS: THERE IS A T11 COMPRESSION FRACTURE THAT MAY HAVE PROGRESSED WHEN COMPARED TO THE PRIOR STUDY.  THERE IS BASELINE DJD AND DISH.     Impression:     T11 COMPRESSION FRACTURE MAY HAVE PROGRESSED FROM THE PRIOR STUDY.        Electronically signed by: Jace Ching MD  Date:                                            03/01/2024  Time:                                           13:03    XR THORACIC SPINE AP LATERAL    "  CLINICAL HISTORY:  Vertebrogenic low back pain     FINDINGS:  Thoracic spine two views: There is mild DJD.  There is dish.  There is a T11 compression deformity which was seen on the CT study dated 11/15/2022.  This may have progressed when compared to the prior study dated 11/15/2022.     Impression:     T11 compression deformity may have progressed from the prior CT.     DJD and dish.        Electronically signed by: Jace Ching MD  Date:                                            03/01/2024  Time:                                           13:02    Pain Disability Index Review:      3/14/2024    12:55 PM   Last 3 PDI Scores   Pain Disability Index (PDI) 40       Allergies:   Review of patient's allergies indicates:   Allergen Reactions    Enbrel [etanercept] Shortness Of Breath     CHF    Nsaids (non-steroidal anti-inflammatory drug) Other (See Comments)     hypertention  Other reaction(s): Other (See Comments)  hypertention  HTN  Patient states he tolerates anti inflammatory eye drops    Statins-hmg-coa reductase inhibitors Other (See Comments)     Heart arrythemias  Other reaction(s): Other (See Comments)  Heart arrythemias  Joint pain and cardiac arrythmias    Pcn [penicillins] Rash       Current Medications:   Current Outpatient Medications   Medication Sig Dispense Refill    aspirin 81 MG Chew Take 81 mg by mouth.      BD ULTRA-FINE RAHUL PEN NEEDLE 32 gauge x 5/32" Ndle To use 6 daily 400 each 3    carvediloL (COREG) 6.25 MG tablet Take 1 tablet (6.25 mg total) by mouth 2 (two) times daily with meals. 180 tablet 3    digoxin (LANOXIN) 125 mcg tablet TAKE 1 TABLET BY MOUTH ONCE DAILY. 90 tablet 3    diltiaZEM (CARDIZEM CD) 120 MG Cp24 Take 120 mg by mouth Daily.      ergocalciferol (ERGOCALCIFEROL) 50,000 unit Cap Take 1 capsule (50,000 Units total) by mouth every 7 days. 12 capsule 3    EScitalopram oxalate (LEXAPRO) 20 MG tablet Take 20 mg by mouth.      furosemide (LASIX) 80 MG tablet Take 1 tablet (80 " mg total) by mouth 2 (two) times a day. 180 tablet 3    gabapentin (NEURONTIN) 300 MG capsule       HYDROcodone-acetaminophen (NORCO)  mg per tablet TAKE 1 TAB BY MOUTH EVERY 6 HOURS AS NEEDED FOR PAIN, GREATER THAN 7 DAYS MEDICALLY NECESSARY. STOP TAKING NORCO 5/325 120 tablet 0    HYDROcodone-acetaminophen (NORCO)  mg per tablet Take 1 tablet by mouth every 6 (six) hours as needed. GREATER THAN 7 DAYS MEDICALLY NECESSARY 120 tablet 0    HYDROcodone-acetaminophen (NORCO)  mg per tablet TAKE 1 tablet by mouth every 6 hours as needed for  PAIN, GREATER THAN 7 DAYS MEDICALLY NECESSARY 120 tablet 0    HYDROmorphone (DILAUDID) 4 MG tablet TAKE ONE (1) TABLET BY MOUTH EVERY EIGHT(8) HOURS AS NEEDED FOR PAIN. ONLY FOR SHORT TERM USE. - DO NOT TAKE WITH NORCO 12 tablet 0    HYDROmorphone (DILAUDID) 4 MG tablet TAKE 1½ Tablets by mouth EVERY 6 HOURS as needed for SEVERE PAIN 60 tablet 0    HYDROmorphone (DILAUDID) 4 MG tablet Take 1.5 tablets (6 mg total) by mouth every 6 (six) hours as needed for severe pain. 60 tablet 0    insulin aspart, niacinamide, (FIASP PENFILL U-100 INSULIN) 100 unit/mL (3 mL) Crtg pen To use with inPen; 140 max daily dose. 14 pen 11    insulin degludec (TRESIBA FLEXTOUCH U-200) 200 unit/mL (3 mL) insulin pen Inject 60 Units into the skin once daily. Cartridge for novoecho 3 pen 11    ketoconazole (NIZORAL) 2 % cream Apply to affected area DAILY 30 g 3    lamiVUDine (EPIVIR) 150 MG Tab TAKE 1 TABLET BY MOUTH EVERY DAY 30 tablet 23    leflunomide (ARAVA) 10 MG Tab TAKE 1 TABLET BY MOUTH EVERY DAY 30 tablet 0    metoprolol tartrate (LOPRESSOR) 50 MG tablet Take 50 mg by mouth every 12 (twelve) hours.      moxifloxacin (VIGAMOX) 0.5 % ophthalmic solution Place 1 drop into the right eye 3 (three) times daily. 3 mL 3    multivitamin (THERAGRAN) per tablet Take 1 tablet by mouth once daily.      mupirocin (BACTROBAN) 2 % ointment Apply to affected area 3 times daily 22 g 1     nitroGLYCERIN (NITROSTAT) 0.4 MG SL tablet PLACE 1 TABLET UNDER THE TONGUE EVERY 5 MINUTES AS NEEDED FOR CHEST PAIN. 100 tablet 2    olmesartan (BENICAR) 40 MG tablet TAKE 1 TABLET BY MOUTH EVERY DAY AT NIGHT 90 tablet 3    prednisoLONE acetate (PRED FORTE) 1 % DrpS Place 1 drop into the right eye 3 (three) times daily. 10 mL 3    predniSONE (DELTASONE) 2.5 MG tablet TAKE 5 TABLETS (12.5 MG TOTAL) BY MOUTH ONCE DAILY. 150 tablet 2    predniSONE (DELTASONE) 5 MG tablet TAKE 2 TABLETS BY MOUTH ONCE DAILY. 60 tablet 3    PROAIR HFA 90 mcg/actuation inhaler       sulfamethoxazole-trimethoprim 800-160mg (BACTRIM DS) 800-160 mg Tab Take 1 tablet by mouth 2 (two) times daily. 14 tablet 0    tirzepatide 7.5 mg/0.5 mL PnIj Inject 7.5 mg into the skin every 7 days. 4 Pen 11    tiZANidine (ZANAFLEX) 4 MG tablet TAKE 1 TABLET BY MOUTH EVERY 8 HOURS AS NEEDED FOR PAIN 30 tablet 2    tocilizumab 162 mg/0.9 mL PnIj Inject 162 mg into the skin once a week. 4 mL 0    tocilizumab 162 mg/0.9 mL PnIj Inject 162 mg into the skin once a week. 4 mL 6    folic acid (FOLVITE) 1 MG tablet Take 1 tablet (1 mg total) by mouth once daily. 30 tablet 5    nystatin (MYCOSTATIN) powder Apply topically 4 (four) times daily. Apply to intertriginous areas as directed up to four times daily to reduce moisture. for 14 days 30 g 0     Current Facility-Administered Medications   Medication Dose Route Frequency Provider Last Rate Last Admin    sodium chloride 0.9% flush 10 mL  10 mL Intravenous PRN Hans Woodward MD           REVIEW OF SYSTEMS:  GENERAL:  current fevers or chills, recent use of antibiotics denies.  HEENT:  History of migraines/headaches: denies, History of major throat surgery: denies  RESPIRATORY:  History of home oxygen or pulmonary hypertension/severe breathing dysfunction: reports right pneumonectomy and upper lobectomy  CARDIOVASCULAR:  Hx of palpitations/rhythm problems: denies Hx of Heart Attacks/Surgery: reports prior 3 stents,  history right atrial myxoma  GI:  Recent abdominal discomfort or recent change in bowel habits reports abdominal pain (also has constipation)  MUSCULOSKELETAL:  See HPI.  SKIN:  unhealed wounds or rashes: reports scrotal yeast infection and skin tears related to prednisone  PSYCH: major psychiatric history or recent psychosocial stressors reports occasional suicidal thoughts  HEMATOLOGY/LYMPHOLOGY:  Hx of prolonged bleeding, Hx of Blood thinner usage: reports ASA 81mg ; reason: stents  NEURO:   history of seizures, strokes, chronic/old weakness (such as paralysis or paresis of any body part): reports TIA during period of myxoma (no deficits)  All other reviewed and negative other than HPI.    Past Medical History:  Past Medical History:   Diagnosis Date    Arthritis     Atrial myxoma     Coronary atherosclerosis     Diabetes mellitus, type 2     Difficult intubation     Heart failure     Hepatitis B     Hyperlipidemia     Hypertension     Non-alcoholic fatty liver disease     Rheumatoid arthritis     Rheumatoid arthritis flare 07/12/2021    Squamous cell carcinoma of forehead 10/26/2023    forehead    Stroke     TIA       Past Surgical History:  Past Surgical History:   Procedure Laterality Date    CATARACT EXTRACTION W/  INTRAOCULAR LENS IMPLANT Right 3/28/2024    Procedure: EXTRACTION, CATARACT, WITH IOL INSERTION;  Surgeon: Hans Woodward MD;  Location: [facility] OR;  Service: Ophthalmology;  Laterality: Right;    CORONARY ANGIOGRAPHY N/A 3/10/2021    Procedure: ANGIOGRAM, CORONARY ARTERY - right radial;  Surgeon: Shemar Dempsey MD;  Location: [facility] CATH LAB;  Service: Cardiology;  Laterality: N/A;    CORONARY STENT PLACEMENT  03/10/2021    prox-mid RCA [place] 4.5 x 26 mm, 4.5 x 12 mm    INCISION AND DRAINAGE OF SCROTUM N/A 11/15/2022    Procedure: INCISION AND DRAINAGE, SCROTUM;  Surgeon: William Hatch MD;  Location: [facility] OR;  Service: Urology;  Laterality: N/A;    INCISION AND DRAINAGE OF SCROTUM N/A 11/17/2022     Procedure: INCISION AND DRAINAGE, SCROTUM;  Surgeon: William Hatch MD;  Location: [facility];  Service: Urology;  Laterality: N/A;    LUNG LOBECTOMY Right 2008    RUL lobectomy after removal of atrial myxoma    PLEURA BIOPSY      RESECTION OF ATRIAL MYXOMA  2007       Family History:  Family History   Problem Relation Age of Onset    Hodgkin's lymphoma Mother     Diabetes type II Mother     Kidney failure Father     Diabetes type I Father     Cancer Sister        Social History:  Social History     Socioeconomic History    Marital status: Single   Occupational History    Occupation: , respiratory therapist, founded [employer]     Comment: Retired   Tobacco Use    Smoking status: Every Day     Current packs/day: 1.00     Average packs/day: 1 pack/day for 35.0 years (35.0 ttl pk-yrs)     Types: Cigarettes    Smokeless tobacco: Never   Substance and Sexual Activity    Alcohol use: Not Currently    Drug use: No    Sexual activity: Never     Social Determinants of Health     Financial Resource Strain: Low Risk  (2/20/2024)    Overall Financial Resource Strain (CARDIA)     Difficulty of Paying Living Expenses: Not very hard   Recent Concern: Financial Resource Strain - Medium Risk (11/24/2023)    Overall Financial Resource Strain (CARDIA)     Difficulty of Paying Living Expenses: Somewhat hard   Food Insecurity: No Food Insecurity (2/20/2024)    Hunger Vital Sign     Worried About Running Out of Food in the Last Year: Never true     Ran Out of Food in the Last Year: Never true   Recent Concern: Food Insecurity - Food Insecurity Present (11/24/2023)    Hunger Vital Sign     Worried About Running Out of Food in the Last Year: Sometimes true     Ran Out of Food in the Last Year: Patient declined   Transportation Needs: No Transportation Needs (2/20/2024)    PRAPARE - Transportation     Lack of Transportation (Medical): No     Lack of Transportation (Non-Medical): No   Recent Concern: Transportation  "Needs - Unmet Transportation Needs (11/24/2023)    PRAPARE - Transportation     Lack of Transportation (Medical): Yes     Lack of Transportation (Non-Medical): Yes   Physical Activity: Insufficiently Active (2/20/2024)    Exercise Vital Sign     Days of Exercise per Week: 1 day     Minutes of Exercise per Session: 10 min   Stress: No Stress Concern Present (2/20/2024)    [employer] - Occupational Stress Questionnaire     Feeling of Stress : Not at all   Recent Concern: Stress - Stress Concern Present (11/24/2023)    [facility] - Occupational Stress Questionnaire     Feeling of Stress : To some extent   Social Connections: Unknown (11/24/2023)    Social Connection and Isolation Panel [NHANES]     Frequency of Communication with Friends and Family: More than three times a week     Frequency of Social Gatherings with Friends and Family: Once a week     Active Member of Clubs or Organizations: Yes     Attends Club or Organization Meetings: More than 4 times per year     Marital Status:    Housing Stability: Low Risk  (2/20/2024)    Housing Stability Vital Sign     Unable to Pay for Housing in the Last Year: No     Number of Places Lived in the Last Year: 1     Unstable Housing in the Last Year: No   Recent Concern: Housing Stability - High Risk (11/24/2023)    Housing Stability Vital Sign     Unable to Pay for Housing in the Last Year: Yes     Number of Places Lived in the Last Year: 1     Unstable Housing in the Last Year: No       OBJECTIVE:    BP (!) 111/59 (BP Location: Left arm, Patient Position: Sitting, BP Method: Medium (Automatic))   Pulse 99   Resp 18   Ht 5' 11" (1.803 m)   Wt 100.2 kg (220 lb 14.4 oz)   SpO2 100%   BMI 30.81 kg/m²     PHYSICAL EXAMINATION:  General appearance: Well appearing, in no acute distress, alert and appropriately communicative.  Psych:  Mood and affect appropriate.  Skin: Skin color, texture, turgor normal, no " rashes or lesions, in both upper and lower body for exposed skin.  Head/face:  Atraumatic, normocephalic.  Cor: regular rate  Pulm: non-labored breathing  GI: Abdomen non-distended and non-tender.  Back: Straight leg raising in the sitting and supine positions is negative to radicular pain.  pain to palpation over the spine and/or paraspinal muscles. Pain with lumbar flexion. Pain to percussion just under rib cage.  Extremities: No deformities, significant lymphedema, or skin discoloration. No significant open wounds. No major amputations.  Musculoskeletal:  Bilateral upper and lower extremity strength is normal and symmetric with the exception of left IP 4/5.  No atrophy or tone abnormalities are noted.  Neuro: Bilateral upper and lower extremity coordination and muscle stretch reflexes abnormalities noted:.  Brewer and/or Clonus: none; loss of sensation to light touch: none  Gait: walker    CMP  Sodium   Date Value Ref Range Status   04/03/2024 142 136 - 145 mmol/L Final     Potassium   Date Value Ref Range Status   04/03/2024 4.1 3.5 - 5.1 mmol/L Final     Chloride   Date Value Ref Range Status   04/03/2024 100 95 - 110 mmol/L Final     CO2   Date Value Ref Range Status   04/03/2024 30 (H) 23 - 29 mmol/L Final     Carbon Monoxide, Blood   Date Value Ref Range Status   11/04/2021 11 (H) % Final     Comment:     -------------------REFERENCE VALUE--------------------------  0-2 Normal (Non-smoker) , < or = 9 Normal (Smoker), > or   = 20 (Toxic concentration)    Test Performed by:  [facility]  [address] [place] [address]  : Demetrius Saldana M.D. Ph.D.; CLIA# [id number]       Glucose   Date Value Ref Range Status   04/03/2024 181 (H) 70 - 110 mg/dL Final     BUN   Date Value Ref Range Status   04/03/2024 16 8 - 23 mg/dL Final     Creatinine   Date Value Ref Range Status   04/03/2024 1.1 0.5 - 1.4 mg/dL Final     Calcium   Date Value Ref Range  Status   04/03/2024 9.3 8.7 - 10.5 mg/dL Final     Total Protein   Date Value Ref Range Status   03/01/2024 6.3 6.0 - 8.4 g/dL Final     Albumin   Date Value Ref Range Status   03/01/2024 3.4 (L) 3.5 - 5.2 g/dL Final   06/21/2022 4.1 3.6 - 5.1 g/dL Final     Total Bilirubin   Date Value Ref Range Status   03/01/2024 0.6 0.1 - 1.0 mg/dL Final     Comment:     For infants and newborns, interpretation of results should be based  on gestational age, weight and in agreement with clinical  observations.    Premature Infant recommended reference ranges:  Up to 24 hours.............<8.0 mg/dL  Up to 48 hours............<12.0 mg/dL  3-5 days..................<15.0 mg/dL  6-29 days.................<15.0 mg/dL       Alkaline Phosphatase   Date Value Ref Range Status   03/01/2024 102 55 - 135 U/L Final     AST   Date Value Ref Range Status   03/01/2024 24 10 - 40 U/L Final     ALT   Date Value Ref Range Status   03/01/2024 52 (H) 10 - 44 U/L Final     Anion Gap   Date Value Ref Range Status   04/03/2024 12 8 - 16 mmol/L Final     eGFR   Date Value Ref Range Status   04/03/2024 >60 >60 mL/min/1.73 m^2 Final     ASSESSMENT: 68 y.o. year old male with mid back and lower back pain, consistent with:     1. Closed wedge compression fracture of T11 vertebra, initial encounter        2. Compression fracture of L3 vertebra, initial encounter        3. Lumbar radiculopathy            IMPRESSION: Nithin Wagner presents today for mid back and lower back pain. History and physical exam are consistent with axial mid back and lower back pain and lumbar radiculopathy.  Imaging is consistent with  compression fracture of T11 and L3.  He is scheduled to have a T11 and L3 kyphoplasty on 4/22/2024.    PLAN:   - I have stressed the importance of physical activity and a home exercise plan to help with pain and improve health.  - Patient can continue with medications for now since they are providing benefits, using them appropriately, and without  side effects.  - MRI reviewed with Mr. Wagner  - we discussed plan of care regarding his compression fractures and plan of care concerning his lumbar radiculopathy  - we can plan for an epidural injection once his compression fractures have been treated.  - he is scheduled to meet with anesthesia for optimization tomorrow  - RTC after kyphoplasty  - Counseled patient regarding the importance of activity modification and physical therapy.    The above plan and management options were discussed at length with patient. Patient is in agreement with the above and verbalized understanding.    Stefan Stern  04/11/2024

## 2024-04-11 NOTE — PROGRESS NOTES
Diabetes Care Specialist Virtual Visit Note     The patient location is: [place]  The chief complaint leading to consultation is: Diabetes  Visit type: audiovisual  Total time spent with patient: 30 min   Each patient to whom he or she provides medical services by telemedicine is:  (1) informed of the relationship between the physician and patient and the respective role of any other health care provider with respect to management of the patient; and (2) notified that he or she may decline to receive medical services by telemedicine and may withdraw from such care at any time.    Diabetes Care Specialist Progress Note  Author: Lalitha Stroud RN, CDE  Date: 4/11/2024    Program Intake  Reason for Diabetes Program Visit:: Intervention  Type of Intervention:: Individual  Individual: Education  Education: Self-Management Skill Review, Nutrition and Meal Planning  Current diabetes risk level:: low  Continuous Glucose Monitoring  Patient has CGM: Yes  Personal CGM type:: Dexcom    Lab Results   Component Value Date    HGBA1C 7.4 (H) 03/01/2024     Diabetes Self-Management Skills Assessment    Home Blood Glucose Monitoring  Personal CGM type:: Dexcom    Assessment Summary and Plan    Based on today's diabetes care assessment, the following areas of need were identified:          10/11/2023    12:01 AM   Social   Support No   Access to Mass Media/Tech No   Cognitive/Behavioral Health No   Culture/Rastafari No   Communication No   Health Literacy No          3/22/2023    12:02 AM   Clinical   Nutritional Status No         12/6/2023    12:01 AM   Diabetes Self-Management Skills   Medication No   Home Blood Glucose Monitoring No      Today's interventions were provided through individual discussion, instruction, and written materials were provided.      Patient verbalized understanding of instruction and written materials.  Pt was able to return back demonstration of instructions today. Patient understood key points, needs  reinforcement and further instruction.     Diabetes Self-Management Care Plan:    Today's Diabetes Self-Management Care Plan was developed with Nithin's input. Nithin has agreed to work toward the following goal(s) to improve his/her overall diabetes control.      Care Plan: Diabetes Management   Updates made since 3/12/2024 12:00 AM        Problem: Medications         Goal: Pt will increse Mounjaro to 7.5 mg.    Start Date: 3/6/2024   Expected End Date: 4/6/2024   This Visit's Progress: Met   Priority: High   Barriers: No Barriers Identified   Note:    4/10/24 - Pt has been taking Mounjaro 7.5mg consistently. Novolog daily dose down to 5-15u/day now. Pt continuing to take 50u of Tresiba QD. Seeing Izabel Marc soon for continued insulin reduction/dose adjustment.        Follow Up Plan     Follow up in about 8 weeks (around 6/5/2024) for dexcom review. .    Today's care plan and follow up schedule was discussed with patient.  Nithin verbalized understanding of the care plan, goals, and agrees to follow up plan.        The patient was encouraged to communicate with his/her health care provider/physician and care team regarding his/her condition(s) and treatment.  I provided the patient with my contact information today and encouraged to contact me via phone or [facility] Patient Portal as needed.     Length of Visit   Total Time: 30 Minutes

## 2024-04-15 ENCOUNTER — TELEPHONE (OUTPATIENT)
Dept: OPHTHALMOLOGY | Facility: CLINIC | Age: 69
End: 2024-04-15
Payer: MEDICARE

## 2024-04-15 ENCOUNTER — PATIENT MESSAGE (OUTPATIENT)
Dept: OPHTHALMOLOGY | Facility: CLINIC | Age: 69
End: 2024-04-15
Payer: MEDICARE

## 2024-04-15 NOTE — TELEPHONE ENCOUNTER
Patient given arrival time of 7 :00 am on Thursday April 18. Nothing to eat or drink after 11:59 pm.   Start drops into the operative eye today. [facility]

## 2024-04-16 ENCOUNTER — TELEPHONE (OUTPATIENT)
Dept: PAIN MEDICINE | Facility: CLINIC | Age: 69
End: 2024-04-16
Payer: MEDICARE

## 2024-04-16 NOTE — TELEPHONE ENCOUNTER
----- Message from Monika Machado sent at 4/16/2024  1:16 PM CDT -----  Regarding: appt  Type:  Patient Returning Call      Name of who is calling:Nithin        What is request in detail:Patient is requesting a call back with questions to upcoming appt.Would like later appt due to transportation        Can clinic reply by [facility]:no        What number to call back if not in [facility]: [phone]

## 2024-04-16 NOTE — TELEPHONE ENCOUNTER
Staff spoke with patient and he states he just wanted to know what this appointment was for. Staff informed him that this was a f/u appointment.

## 2024-04-17 ENCOUNTER — HOSPITAL ENCOUNTER (OUTPATIENT)
Dept: PREADMISSION TESTING | Facility: OTHER | Age: 69
Discharge: HOME | End: 2024-04-17
Attending: STUDENT IN AN ORGANIZED HEALTH CARE EDUCATION/TRAINING PROGRAM
Payer: MEDICARE

## 2024-04-17 ENCOUNTER — ANESTHESIA EVENT (OUTPATIENT)
Dept: SURGERY | Facility: OTHER | Age: 69
End: 2024-04-17
Payer: MEDICARE

## 2024-04-17 VITALS
WEIGHT: 220 LBS | HEIGHT: 71 IN | SYSTOLIC BLOOD PRESSURE: 95 MMHG | HEART RATE: 85 BPM | BODY MASS INDEX: 30.8 KG/M2 | OXYGEN SATURATION: 93 % | DIASTOLIC BLOOD PRESSURE: 53 MMHG | TEMPERATURE: 98 F

## 2024-04-17 RX ORDER — ACETAMINOPHEN 325 MG/1
975 TABLET ORAL
Status: CANCELLED | OUTPATIENT
Start: 2024-04-17 | End: 2024-04-17

## 2024-04-17 RX ORDER — FAMOTIDINE 20 MG/1
20 TABLET, FILM COATED ORAL
Status: CANCELLED | OUTPATIENT
Start: 2024-04-17 | End: 2024-04-17

## 2024-04-17 RX ORDER — SODIUM CHLORIDE, SODIUM LACTATE, POTASSIUM CHLORIDE, CALCIUM CHLORIDE 600; 310; 30; 20 MG/100ML; MG/100ML; MG/100ML; MG/100ML
INJECTION, SOLUTION INTRAVENOUS CONTINUOUS
Status: CANCELLED | OUTPATIENT
Start: 2024-04-17

## 2024-04-17 RX ORDER — LIDOCAINE HYDROCHLORIDE 10 MG/ML
0.5 INJECTION, SOLUTION EPIDURAL; INFILTRATION; INTRACAUDAL; PERINEURAL ONCE
Status: CANCELLED | OUTPATIENT
Start: 2024-04-17 | End: 2024-04-17

## 2024-04-17 NOTE — DISCHARGE INSTRUCTIONS
Information to Prepare you for your Surgery    PRE-ADMIT TESTING -  [phone]    [address]  [facility]          Your surgery has been scheduled at [facility]. We are pleased to have the opportunity to serve you. For Further Information please call [phone].    On the day of surgery please report to the Information Desk on the 1st floor.    CONTACT YOUR PHYSICIAN'S OFFICE THE DAY PRIOR TO YOUR SURGERY TO OBTAIN YOUR ARRIVAL TIME.     The evening before surgery do not eat anything after 9 p.m. ( this includes hard candy, chewing gum and mints).  You may only have GATORADE, POWERADE AND WATER  from 9 p.m. until you leave your home.   DO NOT DRINK ANY LIQUIDS ON THE WAY TO THE HOSPITAL.      Why does your anesthesiologist allow you to drink Gatorade/Powerade before surgery?  Gatorade/Powerade helps to increase your comfort before surgery and to decrease your nausea after surgery. The carbohydrates in Gatorade/Powerade help reduce your body's stress response to surgery.  If you are a diabetic-drink only water prior to surgery.    Outpatient Surgery- May allow 2 adult (18 and older) Support Persons (1 being the designated ) for all surgical/procedural patients. A breastfeeding mother will be allowed her infant and 2 adult Support Persons. No one under the age of 18 will be allowed in the building. No swapping out of visitors in the [facility].      SPECIAL MEDICATION INSTRUCTIONS: TAKE medications checked off by the Anesthesiologist on your Medication List.    Angiogram Patients: Take medications as instructed by your physician, including aspirin.     Surgery Patients:    If you take ASPIRIN - Your PHYSICIAN/SURGEON will need to inform you IF/OR when you need to stop taking aspirin prior to your surgery.     The week prior to surgery do not ot take any medications containing IBUPROFEN or NSAIDS ( Advil, Motrin, Goodys, BC, Aleve, Naproxen etc)  If you are not sure if you should take a medicine please call your surgeon's office.  Ok to take Tylenol    Do Not Wear any make-up (especially eye make-up) to surgery. Please remove any false eyelashes or eyelash extensions. If you arrive the day of surgery with makeup/eyelashes on you will be required to remove prior to surgery. (There is a risk of corneal abrasions if eye makeup/eyelash extensions are not removed)      Leave all valuables at home.   Do Not wear any jewelry or watches, including any metal in body piercings. Jewelry must be removed prior to coming to the hospital.  There is a possibility that rings that are unable to be removed may be cut off if they are on the surgical extremity.    Please remove all hair extensions, wigs, clips and any other metal accessories/ ornaments from your hair.  These items may pose a flammable/fire risk in Surgery and must be removed.    Do not shave your surgical area at least 5 days prior to your surgery. The surgical prep will be performed at the hospital according to Infection Control regulations.    Contact Lens must be removed before surgery. Either do not wear the contact lens or bring a case and solution for storage.  Please bring a container for eyeglasses or dentures as required.  Bring any paperwork your physician has provided, such as consent forms,  history and physicals, doctor's orders, etc.   Bring comfortable clothes that are loose fitting to wear upon discharge. Take into consideration the type of surgery being performed.  Maintain your diet as advised per your physician the day prior to surgery.      Adequate rest the night before surgery is advised.   Park in the Parking lot behind the hospital or in the [facility] across the street from the parking lot. Parking is complimentary.  If you will be discharged the same day as your procedure, please arrange for a responsible adult to drive you home or to accompany you if traveling by taxi.    YOU WILL NOT BE PERMITTED TO DRIVE OR TO LEAVE THE HOSPITAL ALONE AFTER SURGERY.   If you are being discharged the same day, it is strongly recommended that you arrange for someone to remain with you for the first 24 hrs following your surgery.    The Surgeon will speak to your family/visitor after your surgery regarding the outcome of your surgery and post op care.  The Surgeon may speak to you after your surgery, but there is a possibility you may not remember the details.  Please check with your family members regarding the conversation with the Surgeon.    We strongly recommend whoever is bringing you home be present for discharge instructions.  This will ensure a thorough understanding for your post op home care.          Thank you for your cooperation.  The Staff of [facility].            Bathing Instructions with Hibiclens    Shower the evening before and morning of your procedure with Chlorhexidine (Hibiclens)  do not use Chlorhexidine on your face or genitals. Do not get in your eyes.  Wash your face with water and your regular face wash/soap  Use your regular shampoo  Apply Chlorhexidine (Hibiclens) directly on your skin or on a wet washcloth and wash gently. When showering: Move away from the shower stream when applying Chlorhexidine (Hibiclens) to avoid rinsing off too soon.  Rinse thoroughly with warm water  Do not dilute Chlorhexidine (Hibiclens)   Dry off as usual, do not use any deodorant, powder, body lotions, perfume, after shave or cologne.

## 2024-04-17 NOTE — ANESTHESIA PREPROCEDURE EVALUATION
04/17/2024  Nithin Wagner is a 68 y.o., male.      Pre-op Assessment    I have reviewed the Patient Summary Reports.     I have reviewed the Nursing Notes. I have reviewed the NPO Status.   I have reviewed the Medications.     Review of Systems  Anesthesia Hx:  No problems with previous Anesthesia                Social:  Smoker 1 ppd      Hematology/Oncology:  Hematology Normal   Oncology Normal                                   EENT/Dental:  EENT/Dental Normal           Cardiovascular:     Hypertension, well controlled   CAD    Dysrhythmias   CHF    hyperlipidemia    Recent Echo 65%                         Pulmonary:  Pulmonary Normal                       Hepatic/GI:      Liver Disease, Hepatitis, B NAFLD          Neurological:   CVA                                    Endocrine:  Diabetes         Obesity / BMI > 30  Psych:  Psychiatric History anxiety depression                Physical Exam  General: Cooperative and Alert    Airway:  Mallampati: II   Mouth Opening: Normal  TM Distance: Normal  Tongue: Normal  Neck ROM: Normal ROM    Dental:  Intact        Anesthesia Plan  Type of Anesthesia, risks & benefits discussed:    Anesthesia Type: MAC  Intra-op Monitoring Plan: Standard ASA Monitors  Post Op Pain Control Plan: multimodal analgesia  Induction:  IV  Informed Consent: Informed consent signed with the Patient and all parties understand the risks and agree with anesthesia plan.  All questions answered.   ASA Score: 3  Anesthesia Plan Notes:   4/3/24 echo. Left Ventricle: The left ventricle is normal in size. Increased wall thickness. There is concentric remodeling. There is normal systolic function with a visually estimated ejection fraction of 55 - 60%. Grade I diastolic dysfunction.  ·  Right Ventricle: Normal right ventricular cavity size. Wall thickness is normal. Right ventricle wall motion  is  normal. Systolic function is normal.    Pt on mounjaro, saw pcp last month.  Will be off mounjaro for over 2 weeks.   Plan mac , no propofol    Had visit with sabine, cardiology last month. Assessment  1. Acute on chronic diastolic heart failure  Seems mildly decompensated     2. Supraventricular tachycardia  Controlled on current regimen     3. Atherosclerosis of native coronary artery of native heart without angina pectoris  Stable and free of angina     4. Primary hypertension controlled.    Pt had sx with us 2 yrs ago emergently, noted difficult airway. Pt has lost over 70lbs since then.                 Ready For Surgery From Anesthesia Perspective.     .

## 2024-04-18 ENCOUNTER — PATIENT MESSAGE (OUTPATIENT)
Dept: DIABETES | Facility: CLINIC | Age: 69
End: 2024-04-18
Payer: MEDICARE

## 2024-04-18 DIAGNOSIS — S32.010D CLOSED COMPRESSION FRACTURE OF THORACOLUMBAR VERTEBRA WITH ROUTINE HEALING, SUBSEQUENT ENCOUNTER: Primary | ICD-10-CM

## 2024-04-18 DIAGNOSIS — S22.080D CLOSED COMPRESSION FRACTURE OF THORACOLUMBAR VERTEBRA WITH ROUTINE HEALING, SUBSEQUENT ENCOUNTER: Primary | ICD-10-CM

## 2024-04-19 ENCOUNTER — PATIENT MESSAGE (OUTPATIENT)
Dept: PAIN MEDICINE | Facility: CLINIC | Age: 69
End: 2024-04-19
Payer: MEDICARE

## 2024-04-22 ENCOUNTER — ANESTHESIA (OUTPATIENT)
Dept: SURGERY | Facility: OTHER | Age: 69
End: 2024-04-22
Payer: MEDICARE

## 2024-04-22 ENCOUNTER — HOSPITAL ENCOUNTER (OUTPATIENT)
Facility: OTHER | Age: 69
Discharge: HOME | End: 2024-04-22
Attending: STUDENT IN AN ORGANIZED HEALTH CARE EDUCATION/TRAINING PROGRAM | Admitting: STUDENT IN AN ORGANIZED HEALTH CARE EDUCATION/TRAINING PROGRAM
Payer: MEDICARE

## 2024-04-22 DIAGNOSIS — S22.000A COMPRESSION FRACTURE OF BODY OF THORACIC VERTEBRA: Primary | ICD-10-CM

## 2024-04-22 LAB — GLUCOSE SERPL-MCNC: 192 MG/DL (ref 70–110)

## 2024-04-22 PROCEDURE — 25000003 PHARM REV CODE 250: Performed by: ANESTHESIOLOGY

## 2024-04-22 PROCEDURE — 25500020 PHARM REV CODE 255: Performed by: STUDENT IN AN ORGANIZED HEALTH CARE EDUCATION/TRAINING PROGRAM

## 2024-04-22 PROCEDURE — 37000008 HC ANESTHESIA 1ST 15 MINUTES: Performed by: STUDENT IN AN ORGANIZED HEALTH CARE EDUCATION/TRAINING PROGRAM

## 2024-04-22 PROCEDURE — 25000003 PHARM REV CODE 250: Performed by: NURSE ANESTHETIST, CERTIFIED REGISTERED

## 2024-04-22 PROCEDURE — 22513 PERQ VERTEBRAL AUGMENTATION: CPT | Mod: ,,, | Performed by: STUDENT IN AN ORGANIZED HEALTH CARE EDUCATION/TRAINING PROGRAM

## 2024-04-22 PROCEDURE — D9220A PRA ANESTHESIA: Mod: CRNA,,, | Performed by: NURSE ANESTHETIST, CERTIFIED REGISTERED

## 2024-04-22 PROCEDURE — D9220A PRA ANESTHESIA: Mod: ANES,,, | Performed by: ANESTHESIOLOGY

## 2024-04-22 PROCEDURE — 71000015 HC POSTOP RECOV 1ST HR: Performed by: STUDENT IN AN ORGANIZED HEALTH CARE EDUCATION/TRAINING PROGRAM

## 2024-04-22 PROCEDURE — 25000003 PHARM REV CODE 250: Performed by: STUDENT IN AN ORGANIZED HEALTH CARE EDUCATION/TRAINING PROGRAM

## 2024-04-22 PROCEDURE — 71000016 HC POSTOP RECOV ADDL HR: Performed by: STUDENT IN AN ORGANIZED HEALTH CARE EDUCATION/TRAINING PROGRAM

## 2024-04-22 PROCEDURE — 27201423 OPTIME MED/SURG SUP & DEVICES STERILE SUPPLY: Performed by: STUDENT IN AN ORGANIZED HEALTH CARE EDUCATION/TRAINING PROGRAM

## 2024-04-22 PROCEDURE — 63600175 PHARM REV CODE 636 W HCPCS: Performed by: ANESTHESIOLOGY

## 2024-04-22 PROCEDURE — 88311 DECALCIFY TISSUE: CPT | Performed by: PATHOLOGY

## 2024-04-22 PROCEDURE — C1726 CATH, BAL DIL, NON-VASCULAR: HCPCS | Performed by: STUDENT IN AN ORGANIZED HEALTH CARE EDUCATION/TRAINING PROGRAM

## 2024-04-22 PROCEDURE — C1713 ANCHOR/SCREW BN/BN,TIS/BN: HCPCS | Performed by: STUDENT IN AN ORGANIZED HEALTH CARE EDUCATION/TRAINING PROGRAM

## 2024-04-22 PROCEDURE — 22515 PERQ VERTEBRAL AUGMENTATION: CPT | Mod: ,,, | Performed by: STUDENT IN AN ORGANIZED HEALTH CARE EDUCATION/TRAINING PROGRAM

## 2024-04-22 PROCEDURE — 88307 TISSUE EXAM BY PATHOLOGIST: CPT | Mod: 26,,, | Performed by: PATHOLOGY

## 2024-04-22 PROCEDURE — 37000009 HC ANESTHESIA EA ADD 15 MINS: Performed by: STUDENT IN AN ORGANIZED HEALTH CARE EDUCATION/TRAINING PROGRAM

## 2024-04-22 PROCEDURE — 63600175 PHARM REV CODE 636 W HCPCS: Mod: JZ,JG | Performed by: STUDENT IN AN ORGANIZED HEALTH CARE EDUCATION/TRAINING PROGRAM

## 2024-04-22 PROCEDURE — 88307 TISSUE EXAM BY PATHOLOGIST: CPT | Performed by: PATHOLOGY

## 2024-04-22 PROCEDURE — 36000707: Performed by: STUDENT IN AN ORGANIZED HEALTH CARE EDUCATION/TRAINING PROGRAM

## 2024-04-22 PROCEDURE — 36000706: Performed by: STUDENT IN AN ORGANIZED HEALTH CARE EDUCATION/TRAINING PROGRAM

## 2024-04-22 PROCEDURE — 63600175 PHARM REV CODE 636 W HCPCS: Performed by: NURSE ANESTHETIST, CERTIFIED REGISTERED

## 2024-04-22 PROCEDURE — 82962 GLUCOSE BLOOD TEST: CPT | Performed by: STUDENT IN AN ORGANIZED HEALTH CARE EDUCATION/TRAINING PROGRAM

## 2024-04-22 RX ORDER — PROCHLORPERAZINE EDISYLATE 5 MG/ML
5 INJECTION INTRAMUSCULAR; INTRAVENOUS EVERY 30 MIN PRN
Status: DISCONTINUED | OUTPATIENT
Start: 2024-04-22 | End: 2024-04-22 | Stop reason: HOSPADM

## 2024-04-22 RX ORDER — SODIUM CHLORIDE, SODIUM LACTATE, POTASSIUM CHLORIDE, CALCIUM CHLORIDE 600; 310; 30; 20 MG/100ML; MG/100ML; MG/100ML; MG/100ML
INJECTION, SOLUTION INTRAVENOUS CONTINUOUS
Status: DISCONTINUED | OUTPATIENT
Start: 2024-04-22 | End: 2024-04-22 | Stop reason: HOSPADM

## 2024-04-22 RX ORDER — CLINDAMYCIN PHOSPHATE 900 MG/50ML
900 INJECTION, SOLUTION INTRAVENOUS
Status: COMPLETED | OUTPATIENT
Start: 2024-04-22 | End: 2024-04-22

## 2024-04-22 RX ORDER — MEPERIDINE HYDROCHLORIDE 25 MG/ML
12.5 INJECTION INTRAMUSCULAR; INTRAVENOUS; SUBCUTANEOUS ONCE AS NEEDED
Status: DISCONTINUED | OUTPATIENT
Start: 2024-04-22 | End: 2024-04-22 | Stop reason: HOSPADM

## 2024-04-22 RX ORDER — LIDOCAINE HYDROCHLORIDE 10 MG/ML
0.5 INJECTION, SOLUTION EPIDURAL; INFILTRATION; INTRACAUDAL; PERINEURAL ONCE
Status: DISCONTINUED | OUTPATIENT
Start: 2024-04-22 | End: 2024-04-22 | Stop reason: HOSPADM

## 2024-04-22 RX ORDER — SODIUM CHLORIDE 9 MG/ML
INJECTION, SOLUTION INTRAVENOUS CONTINUOUS
Status: DISCONTINUED | OUTPATIENT
Start: 2024-04-22 | End: 2024-04-22 | Stop reason: HOSPADM

## 2024-04-22 RX ORDER — LIDOCAINE HYDROCHLORIDE 10 MG/ML
INJECTION INFILTRATION; PERINEURAL
Status: DISCONTINUED | OUTPATIENT
Start: 2024-04-22 | End: 2024-04-22 | Stop reason: HOSPADM

## 2024-04-22 RX ORDER — BUPIVACAINE HYDROCHLORIDE 2.5 MG/ML
INJECTION, SOLUTION EPIDURAL; INFILTRATION; INTRACAUDAL
Status: DISCONTINUED | OUTPATIENT
Start: 2024-04-22 | End: 2024-04-22 | Stop reason: HOSPADM

## 2024-04-22 RX ORDER — SODIUM CHLORIDE 0.9 % (FLUSH) 0.9 %
3 SYRINGE (ML) INJECTION
Status: DISCONTINUED | OUTPATIENT
Start: 2024-04-22 | End: 2024-04-22 | Stop reason: HOSPADM

## 2024-04-22 RX ORDER — ACETAMINOPHEN 325 MG/1
975 TABLET ORAL
Status: COMPLETED | OUTPATIENT
Start: 2024-04-22 | End: 2024-04-22

## 2024-04-22 RX ORDER — HYDROMORPHONE HYDROCHLORIDE 2 MG/ML
0.4 INJECTION, SOLUTION INTRAMUSCULAR; INTRAVENOUS; SUBCUTANEOUS EVERY 5 MIN PRN
Status: DISCONTINUED | OUTPATIENT
Start: 2024-04-22 | End: 2024-04-22 | Stop reason: HOSPADM

## 2024-04-22 RX ORDER — MIDAZOLAM HYDROCHLORIDE 1 MG/ML
INJECTION INTRAMUSCULAR; INTRAVENOUS
Status: DISCONTINUED | OUTPATIENT
Start: 2024-04-22 | End: 2024-04-22

## 2024-04-22 RX ORDER — FAMOTIDINE 20 MG/1
20 TABLET, FILM COATED ORAL
Status: COMPLETED | OUTPATIENT
Start: 2024-04-22 | End: 2024-04-22

## 2024-04-22 RX ORDER — FENTANYL CITRATE 50 UG/ML
INJECTION, SOLUTION INTRAMUSCULAR; INTRAVENOUS
Status: DISCONTINUED | OUTPATIENT
Start: 2024-04-22 | End: 2024-04-22

## 2024-04-22 RX ORDER — DEXMEDETOMIDINE HYDROCHLORIDE 100 UG/ML
INJECTION, SOLUTION INTRAVENOUS
Status: DISCONTINUED | OUTPATIENT
Start: 2024-04-22 | End: 2024-04-22

## 2024-04-22 RX ORDER — OXYCODONE HYDROCHLORIDE 5 MG/1
5 TABLET ORAL
Status: DISCONTINUED | OUTPATIENT
Start: 2024-04-22 | End: 2024-04-22 | Stop reason: HOSPADM

## 2024-04-22 RX ADMIN — FENTANYL CITRATE 50 MCG: 0.05 INJECTION, SOLUTION INTRAMUSCULAR; INTRAVENOUS at 07:04

## 2024-04-22 RX ADMIN — ACETAMINOPHEN 975 MG: 325 TABLET, FILM COATED ORAL at 06:04

## 2024-04-22 RX ADMIN — FENTANYL CITRATE 50 MCG: 0.05 INJECTION, SOLUTION INTRAMUSCULAR; INTRAVENOUS at 08:04

## 2024-04-22 RX ADMIN — OXYCODONE 5 MG: 5 TABLET ORAL at 09:04

## 2024-04-22 RX ADMIN — DEXMEDETOMIDINE HYDROCHLORIDE 8 MCG: 100 INJECTION, SOLUTION, CONCENTRATE INTRAVENOUS at 07:04

## 2024-04-22 RX ADMIN — MIDAZOLAM HYDROCHLORIDE 1 MG: 1 INJECTION, SOLUTION INTRAMUSCULAR; INTRAVENOUS at 07:04

## 2024-04-22 RX ADMIN — FENTANYL CITRATE 25 MCG: 0.05 INJECTION, SOLUTION INTRAMUSCULAR; INTRAVENOUS at 07:04

## 2024-04-22 RX ADMIN — SODIUM CHLORIDE, SODIUM LACTATE, POTASSIUM CHLORIDE, AND CALCIUM CHLORIDE: 600; 310; 30; 20 INJECTION, SOLUTION INTRAVENOUS at 06:04

## 2024-04-22 RX ADMIN — CLINDAMYCIN PHOSPHATE 900 MG: 18 INJECTION, SOLUTION INTRAVENOUS at 07:04

## 2024-04-22 RX ADMIN — FAMOTIDINE 20 MG: 20 TABLET ORAL at 06:04

## 2024-04-22 RX ADMIN — MIDAZOLAM HYDROCHLORIDE 2 MG: 1 INJECTION, SOLUTION INTRAMUSCULAR; INTRAVENOUS at 06:04

## 2024-04-22 NOTE — DISCHARGE INSTRUCTIONS
Pain Post Surgery Instructions    If you have SEVERE headache with nausea, bleeding, weakness and extreme pain, please call office ([phone] or pager number [phone]). Unless you usually have this type of migraine headache.   If you develop fever (greater than 101 F) or have any redness, swelling, or drainage at the injection site(s), please call off ([phone] or pager number [phone]). This may be a sign of infection.   If a rash or whelps (like hives) occur, please call the office ([phone] or pager number [phone]).  If you are a heart patient, please watch for symptoms such as swelling in your hands and feet and shortness of breath. If any of these symptoms occur, please notify your primary care/ heart doctor and go to the nearest emergency room.  If you have high blood pressure, you may experience an increase in your blood pressure over the next two weeks. Please continue to monitor your blood pressure and contact your primary care provider if your blood pressure does not return to baseline.    NO HEAT TO THE SURGERY SITE for the next 7 days including: bath or shower, heating pad, moist heat, and / or hot tubs.   DO NOT DRIVE OR OPERATE HEAVY MACHINERY UNTIL FOLLOW UP.   Avoid heavy lifting and excessive physical activity until follow up appointment.  OK to resume all medications unless otherwise directed by your doctor.

## 2024-04-22 NOTE — PLAN OF CARE
Nithin Wagner has met all discharge criteria from Phase II. Vital Signs are stable, ambulating  without difficulty. Discharge instructions given, patient verbalized understanding. Discharged from facility via wheelchair in stable condition.

## 2024-04-22 NOTE — OP NOTE
Patient Name: Nithin Wagner  MRN: [id number]    INFORMED CONSENT: The procedure, risks, benefits and options were discussed with patient. There are no contraindications to the procedure. The patient expressed understanding and agreed to proceed. The personnel performing the procedure was discussed. I verify that I personally obtained Nithin's consent prior to the start of the procedure and the signed consent can be found on the patient's chart.      Procedure Date: 4/22/2024  Surgeons and Role:     * Stefan Brown MD - Primary     * Jamel Gaona MD - Co-Surgeon      Pre Procedure diagnosis: M80.88XS Other osteoporosis with current pathological fracture, vertebra(e), sequela  1. Compression fracture of body of thoracic vertebra      Post-Procedure diagnosis: SAME    Sedation: Per Anesthesia Records    PROCEDURE: KYPHOPLASTY  SURGEON:DR. STEFAN BROWN and DR. JAMEL GAONA  ASST: SARY CHU MD  OPERATION:    1.  Balloon Kyphoplasty at T11 and L3 levels   2. Insertion of  bone cement under low pressure at T11 and L3 levels   3. Bone biopsy (medically necessary)    PREOPERATIVE DIAGNOSIS: Vertebral Compression Fracture  POSTOPERATIVE DIAGNOSIS: Vertebral Compression Fracture  PREOPERATIVE ANTIBIOTICS: 900 mg IV Clindamycin given 30 minutes prior to incision, see anesthesia record for time.  OPERATIVE PROCEDURE: The patient was brought to the operating room suite and general anesthesia with endotracheal intubation was performed. The patient was  positioned prone on the Valdez table. The back was prepped and draped. The image  intensifier (C-arm) was brought into position and the T11 and L3 pedicles were identified and marked with a skin marker. In view of the collapse of T11 and L3, a transpedicular approach to the vertebral body was appropriate. An 18-gauge needle was advanced through the T11 pedicle to the junction of the pedicle and vertebral body on the right side followed by 18-gauge needle advanced to L3  pedicle on the left side. Positioning was confirmed on the AP and lateral plane. Following satisfactory placement of the needle, the stylet was removed and local anesthetic was applied. AP and lateral images were taken to verify position and trajectory. Alongside of the guide pin a 1 cm paramedian incision was made. The osteointroducer was placed in the same trajectory and advanced through the pedicle. Once I was at the junction of the pedicle and the vertebral body, a lateral image was taken to insure that the cannula was positioned approximately 1 to 2 cm past the vertebral body wall. Through the cannula, a drill was advanced into the vertebral body under fluoroscopic guidance toward the anterior cortex, creating a channel. After completing the entry into the vertebral body, a 15 mm inflatable bone tamp was inserted through the cannula and advanced under fluoroscopic guidance into the vertebral body near the anterior cortex. The radiopaque marker bands on the bone tamp were identified using AP and lateral images.   Expansion of the bone tamp was done  with careful attention being paid to the inflation pressures and balloon position. The inflation was monitored with AP and lateral imaging. The final balloon volume was 1 cc passing midline. There was no breach of the lateral wall or anterior cortex of the vertebral body. Direct reduction of the fracture was achieved. The balloon was deflated and removed under live fluoroscopy, after which a curved needle was inserted under live fluoroscopy to insure passage across midline   Under fluoroscopic imaging, and the use of the curved needle, internal fixation was achieved through a low-pressure injection of  bone cement. The cavity was filled with a total volume of 5 cc at L3 and 2 cc at T11 covering the whole vertebral body. Once the bone cement had hardened, the curved needle and cannula was then removed under live fluoroscopy.    Post-procedure, all incisions were  closed with bandages. The patient was kept in the prone position for approximately 10 minutes post cement injection. patient was then turned supine, monitored briefly and returned to the floor. PATIENT was moving lower extremities at this time and neurological exam was unchanged from pre-procedure.    COMPLICATIONS: There were no complications.      Blood Loss: Nill  Specimen: T11 and L3 vertebral body bone biopsy    Stefan Stern MD

## 2024-04-22 NOTE — DISCHARGE SUMMARY
Discharge Note  Short Stay      SUMMARY     Admit Date: 4/22/2024    Attending Physician: Stefan Stern MD PhD    Discharge Physician: Stefan Stern      Discharge Date: 4/22/2024 8:52 AM    Procedure(s) (LRB):  KYPHOPLASTY (N/A)    Final Diagnosis: Closed wedge compression fracture of T11 vertebra, initial encounter [S22.080A]    Disposition: Home or self care    Patient Instructions:   Current Discharge Medication List        CONTINUE these medications which have NOT CHANGED    Details   carvediloL (COREG) 6.25 MG tablet Take 1 tablet (6.25 mg total) by mouth 2 (two) times daily with meals.  Qty: 180 tablet, Refills: 3    Associated Diagnoses: Primary hypertension      digoxin (LANOXIN) 125 mcg tablet TAKE 1 TABLET BY MOUTH ONCE DAILY.  Qty: 90 tablet, Refills: 3      diltiaZEM (CARDIZEM CD) 120 MG Cp24 Take 120 mg by mouth Daily.      ergocalciferol (ERGOCALCIFEROL) 50,000 unit Cap Take 1 capsule (50,000 Units total) by mouth every 7 days.  Qty: 12 capsule, Refills: 3    Associated Diagnoses: Vitamin D deficiency      EScitalopram oxalate (LEXAPRO) 20 MG tablet Take 20 mg by mouth every evening.      gabapentin (NEURONTIN) 300 MG capsule 2 (two) times daily.      !! HYDROmorphone (DILAUDID) 4 MG tablet Take one tablet by mouth every 6 hours as needed for severe pain  Qty: 40 tablet, Refills: 0    Comments: 7d medically necessary ok per md      insulin aspart, niacinamide, (FIASP PENFILL U-100 INSULIN) 100 unit/mL (3 mL) Crtg pen To use with inPen; 140 max daily dose.  Qty: 14 pen , Refills: 11    Associated Diagnoses: Type 2 diabetes mellitus with hyperglycemia, with long-term current use of insulin      insulin degludec (TRESIBA FLEXTOUCH U-200) 200 unit/mL (3 mL) insulin pen Inject 60 Units into the skin once daily. Cartridge for novoecho  Qty: 3 pen , Refills: 11    Associated Diagnoses: Type 2 diabetes mellitus with hyperglycemia, with long-term current use of insulin      lamiVUDine (EPIVIR) 150 MG Tab TAKE 1  "TABLET BY MOUTH EVERY DAY  Qty: 30 tablet, Refills: 23    Associated Diagnoses: Need for post exposure prophylaxis for hepatitis B      metoprolol tartrate (LOPRESSOR) 50 MG tablet Take 50 mg by mouth every 12 (twelve) hours.      multivitamin (THERAGRAN) per tablet Take 1 tablet by mouth once daily.      olmesartan (BENICAR) 40 MG tablet TAKE 1 TABLET BY MOUTH EVERY DAY AT NIGHT  Qty: 90 tablet, Refills: 3    Comments: .      !! predniSONE (DELTASONE) 2.5 MG tablet TAKE 5 TABLETS (12.5 MG TOTAL) BY MOUTH ONCE DAILY.  Qty: 150 tablet, Refills: 2    Associated Diagnoses: Rheumatoid arthritis flare      !! predniSONE (DELTASONE) 5 MG tablet TAKE 2 TABLETS BY MOUTH ONCE DAILY.  Qty: 60 tablet, Refills: 3    Associated Diagnoses: Rheumatoid arthritis flare      tiZANidine (ZANAFLEX) 4 MG tablet TAKE 1 TABLET BY MOUTH EVERY 8 HOURS AS NEEDED FOR PAIN  Qty: 30 tablet, Refills: 2      aspirin 81 MG Chew Take 81 mg by mouth.      BD ULTRA-FINE RAHUL PEN NEEDLE 32 gauge x 5/32" Ndle To use 6 daily  Qty: 400 each, Refills: 3    Associated Diagnoses: Type 2 diabetes mellitus with hyperglycemia, with long-term current use of insulin      folic acid (FOLVITE) 1 MG tablet Take 1 tablet (1 mg total) by mouth once daily.  Qty: 30 tablet, Refills: 5      furosemide (LASIX) 80 MG tablet Take 1 tablet (80 mg total) by mouth 2 (two) times a day.  Qty: 180 tablet, Refills: 3    Associated Diagnoses: Chronic systolic heart failure      !! HYDROmorphone (DILAUDID) 4 MG tablet TAKE 1½ Tablets by mouth EVERY 6 HOURS as needed for SEVERE PAIN  Qty: 60 tablet, Refills: 0    Comments: -dx:m54.50      ketoconazole (NIZORAL) 2 % cream Apply to affected area DAILY  Qty: 30 g, Refills: 3      moxifloxacin (VIGAMOX) 0.5 % ophthalmic solution Place 1 drop into the right eye 3 (three) times daily.  Qty: 3 mL, Refills: 3      mupirocin (BACTROBAN) 2 % ointment Apply to affected area 3 times daily  Qty: 22 g, Refills: 1      nitroGLYCERIN (NITROSTAT) " 0.4 MG SL tablet PLACE 1 TABLET UNDER THE TONGUE EVERY 5 MINUTES AS NEEDED FOR CHEST PAIN.  Qty: 100 tablet, Refills: 2    Associated Diagnoses: Atherosclerosis of native coronary artery of native heart with stable angina pectoris      nystatin (MYCOSTATIN) powder Apply topically 4 (four) times daily. Apply to intertriginous areas as directed up to four times daily to reduce moisture. for 14 days  Qty: 30 g, Refills: 0    Associated Diagnoses: Skin yeast infection      prednisoLONE acetate (PRED FORTE) 1 % DrpS Place 1 drop into the right eye 3 (three) times daily.  Qty: 10 mL, Refills: 3      PROAIR HFA 90 mcg/actuation inhaler       tirzepatide 7.5 mg/0.5 mL PnIj Inject 7.5 mg into the skin every 7 days.  Qty: 4 Pen, Refills: 11    Associated Diagnoses: Type 2 diabetes mellitus with hyperglycemia, with long-term current use of insulin      !! tocilizumab 162 mg/0.9 mL PnIj Inject 162 mg into the skin once a week.  Qty: 4 mL, Refills: 0    Associated Diagnoses: Rheumatoid arthritis flare      !! tocilizumab 162 mg/0.9 mL PnIj Inject 162 mg into the skin once a week.  Qty: 4 mL, Refills: 6    Associated Diagnoses: Rheumatoid arthritis flare      walker Misc 1 Units by Misc.(Non-Drug; Combo Route) route daily as needed (Rollator Walker).  Qty: 1 each, Refills: 0    Associated Diagnoses: Closed compression fracture of thoracolumbar vertebra with routine healing, subsequent encounter       !! - Potential duplicate medications found. Please discuss with provider.              Discharge Diagnosis: Closed wedge compression fracture of T11 vertebra, initial encounter [S22.080A]  Condition on Discharge: Stable with no complications to procedure   Diet on Discharge: Same as before.  Activity: as per instruction sheet.  Discharge to: Home with a responsible adult.  Follow up: 2-4 weeks       Please call my office or pager at [phone] if experienced any weakness or loss of sensation, fever > 101.5, pain uncontrolled with  oral medications, persistent nausea/vomiting/or diarrhea, redness or drainage from the incisions, or any other worrisome concerns. If physician on call was not reached or could not communicate with our office for any reason please go to the nearest emergency department      Stefan Stern MD PhD

## 2024-04-22 NOTE — ANESTHESIA POSTPROCEDURE EVALUATION
Anesthesia Post Evaluation    Patient: Nithin Wagner    Procedure(s) Performed: Procedure(s) (LRB):  KYPHOPLASTY (N/A)    Final Anesthesia Type: MAC      Patient location during evaluation: [facility]  Patient participation: Yes- Able to Participate  Level of consciousness: awake and alert, awake and oriented  Post-procedure vital signs: reviewed and stable  Pain management: adequate  Airway patency: patent    PONV status at discharge: No PONV  Anesthetic complications: no      Cardiovascular status: blood pressure returned to baseline, hemodynamically stable and stable  Respiratory status: spontaneous ventilation, unassisted and room air  Hydration status: euvolemic  Follow-up not needed.              Vitals Value Taken Time   /76 04/22/24 0612   Temp 36.8 °C (98.2 °F) 04/22/24 0611   Pulse 91 04/22/24 0611   Resp 16 04/22/24 0611   SpO2 100 % 04/22/24 0611         No case tracking events are documented in the log.      Pain/Renee Score: Pain Rating Prior to Med Admin: 0 (4/22/2024  6:07 AM)

## 2024-04-22 NOTE — INTERVAL H&P NOTE
The patient was examined and no significant changes were noted from the updated H&P or last clinic note.    The risks and benefits of this procedure, including alternative therapies, were discussed with the patient.  The discussion of risks included infection, bleeding, need for additional procedures or surgery, nerve damage, paralysis, adverse medication reaction(s), stroke, and if appropriate for the procedure, death.  Questions regarding the procedure, risks, expected outcome, and possible side effects were solicited and answered to Nithin's satisfaction.  Nithin Wagner wishes to proceed with the injection or procedure as confirmed by written consent.

## 2024-04-23 ENCOUNTER — TELEPHONE (OUTPATIENT)
Dept: PAIN MEDICINE | Facility: CLINIC | Age: 69
End: 2024-04-23
Payer: MEDICARE

## 2024-04-23 ENCOUNTER — NURSE TRIAGE (OUTPATIENT)
Dept: ADMINISTRATIVE | Facility: CLINIC | Age: 69
End: 2024-04-23
Payer: MEDICARE

## 2024-04-23 VITALS
BODY MASS INDEX: 30.8 KG/M2 | RESPIRATION RATE: 16 BRPM | SYSTOLIC BLOOD PRESSURE: 133 MMHG | HEIGHT: 71 IN | OXYGEN SATURATION: 93 % | DIASTOLIC BLOOD PRESSURE: 70 MMHG | HEART RATE: 100 BPM | WEIGHT: 220 LBS | TEMPERATURE: 98 F

## 2024-04-23 LAB
FINAL PATHOLOGIC DIAGNOSIS: NORMAL
GROSS: NORMAL
Lab: NORMAL

## 2024-04-23 NOTE — TELEPHONE ENCOUNTER
Patient transferred from patient access. Patient is post op from a Fractured disc, kyphoplasty, on 4/22. States the 4 by 4 is off in the bed with an adhesive bandage. He is also concerned re needing more pain RX. Triage done-dispo call back by PCP today. Advised I would send message to provider verb understanding.   Reason for Disposition   MILD TO MODERATE post-op pain (e.g., pain scale 1-7) that is not controlled with pain medications    Additional Information   Negative: Sounds like a life-threatening emergency to the triager   Negative: Bright red, wide-spread, sunburn-like rash   Negative: SEVERE headache and after spinal (epidural) anesthesia   Negative: Vomiting and persists > 4 hours   Negative: Vomiting and abdomen looks much more swollen than usual   Negative: Drinking very little and dehydration suspected (e.g., no urine > 12 hours, very dry mouth, very lightheaded)   Negative: Patient sounds very sick or weak to the triager   Negative: Sounds like a serious complication to the triager   Negative: Fever > 100.4 F (38.0 C)   Negative: Caller has URGENT question and triager unable to answer question   Negative: SEVERE post-op pain (e.g., excruciating, pain scale 8-10) that is not controlled with pain medications   Negative: Headache and after spinal (epidural) anesthesia and not severe   Negative: Patient wants to be seen   Negative: Fever present > 3 days (72 hours)    Protocols used: Post-Op Symptoms and [id number]-FM

## 2024-04-23 NOTE — TELEPHONE ENCOUNTER
Called Mr. Wagner in reference to his wound sites.  We discussed that there is Dermabond (skin adhesive) that remains over the stab incisions.  He is ok to leave the sites exposed to air, however he should keep the sites clean/dry.  If there is any concern for worsening redness/pain at the site, he should call back for further instructions.    We also discussed that his pain should get better over the next few days. Regarding his medication, he will follow up with his primary care physician.    Stefan Stern

## 2024-04-24 ENCOUNTER — PATIENT OUTREACH (OUTPATIENT)
Dept: ADMINISTRATIVE | Facility: HOSPITAL | Age: 69
End: 2024-04-24
Payer: MEDICARE

## 2024-04-24 ENCOUNTER — HOSPITAL ENCOUNTER (EMERGENCY)
Facility: OTHER | Age: 69
Discharge: HOME | End: 2024-04-24
Attending: EMERGENCY MEDICINE
Payer: MEDICARE

## 2024-04-24 VITALS
TEMPERATURE: 98 F | RESPIRATION RATE: 16 BRPM | SYSTOLIC BLOOD PRESSURE: 125 MMHG | OXYGEN SATURATION: 99 % | WEIGHT: 220 LBS | HEART RATE: 100 BPM | DIASTOLIC BLOOD PRESSURE: 73 MMHG | BODY MASS INDEX: 30.68 KG/M2

## 2024-04-24 DIAGNOSIS — M54.50 LUMBAR SPINE PAIN: ICD-10-CM

## 2024-04-24 DIAGNOSIS — M54.6 ACUTE MIDLINE THORACIC BACK PAIN: Primary | ICD-10-CM

## 2024-04-24 DIAGNOSIS — R42 DIZZINESS: ICD-10-CM

## 2024-04-24 DIAGNOSIS — Z98.890 S/P KYPHOPLASTY: ICD-10-CM

## 2024-04-24 LAB
ALBUMIN SERPL BCP-MCNC: 2.8 G/DL (ref 3.5–5.2)
ALP SERPL-CCNC: 128 U/L (ref 55–135)
ALT SERPL W/O P-5'-P-CCNC: 11 U/L (ref 10–44)
ANION GAP SERPL CALC-SCNC: 11 MMOL/L (ref 8–16)
AST SERPL-CCNC: 12 U/L (ref 10–40)
BASOPHILS # BLD AUTO: 0.06 K/UL (ref 0–0.2)
BASOPHILS NFR BLD: 0.8 % (ref 0–1.9)
BILIRUB SERPL-MCNC: 0.6 MG/DL (ref 0.1–1)
BUN SERPL-MCNC: 11 MG/DL (ref 8–23)
CALCIUM SERPL-MCNC: 9.2 MG/DL (ref 8.7–10.5)
CHLORIDE SERPL-SCNC: 103 MMOL/L (ref 95–110)
CO2 SERPL-SCNC: 26 MMOL/L (ref 23–29)
CREAT SERPL-MCNC: 0.9 MG/DL (ref 0.5–1.4)
CRP SERPL-MCNC: 63 MG/L (ref 0–8.2)
DIFFERENTIAL METHOD BLD: ABNORMAL
EOSINOPHIL # BLD AUTO: 0.2 K/UL (ref 0–0.5)
EOSINOPHIL NFR BLD: 2.3 % (ref 0–8)
ERYTHROCYTE [DISTWIDTH] IN BLOOD BY AUTOMATED COUNT: 12.4 % (ref 11.5–14.5)
EST. GFR  (NO RACE VARIABLE): >60 ML/MIN/1.73 M^2
GLUCOSE SERPL-MCNC: 174 MG/DL (ref 70–110)
HCT VFR BLD AUTO: 44.9 % (ref 40–54)
HGB BLD-MCNC: 15.1 G/DL (ref 14–18)
IMM GRANULOCYTES # BLD AUTO: 0.07 K/UL (ref 0–0.04)
IMM GRANULOCYTES NFR BLD AUTO: 0.9 % (ref 0–0.5)
LYMPHOCYTES # BLD AUTO: 1.7 K/UL (ref 1–4.8)
LYMPHOCYTES NFR BLD: 21.9 % (ref 18–48)
MCH RBC QN AUTO: 33.6 PG (ref 27–31)
MCHC RBC AUTO-ENTMCNC: 33.6 G/DL (ref 32–36)
MCV RBC AUTO: 100 FL (ref 82–98)
MONOCYTES # BLD AUTO: 1 K/UL (ref 0.3–1)
MONOCYTES NFR BLD: 12.8 % (ref 4–15)
NEUTROPHILS # BLD AUTO: 4.6 K/UL (ref 1.8–7.7)
NEUTROPHILS NFR BLD: 61.3 % (ref 38–73)
NRBC BLD-RTO: 0 /100 WBC
PLATELET # BLD AUTO: 250 K/UL (ref 150–450)
PMV BLD AUTO: 11.1 FL (ref 9.2–12.9)
POCT GLUCOSE: 170 MG/DL (ref 70–110)
POTASSIUM SERPL-SCNC: 4.1 MMOL/L (ref 3.5–5.1)
PROT SERPL-MCNC: 6.3 G/DL (ref 6–8.4)
RBC # BLD AUTO: 4.5 M/UL (ref 4.6–6.2)
SODIUM SERPL-SCNC: 140 MMOL/L (ref 136–145)
WBC # BLD AUTO: 7.55 K/UL (ref 3.9–12.7)

## 2024-04-24 PROCEDURE — 80053 COMPREHEN METABOLIC PANEL: CPT | Performed by: EMERGENCY MEDICINE

## 2024-04-24 PROCEDURE — 96374 THER/PROPH/DIAG INJ IV PUSH: CPT

## 2024-04-24 PROCEDURE — 63600175 PHARM REV CODE 636 W HCPCS: Performed by: EMERGENCY MEDICINE

## 2024-04-24 PROCEDURE — 93005 ELECTROCARDIOGRAM TRACING: CPT

## 2024-04-24 PROCEDURE — 85025 COMPLETE CBC W/AUTO DIFF WBC: CPT | Performed by: EMERGENCY MEDICINE

## 2024-04-24 PROCEDURE — 86140 C-REACTIVE PROTEIN: CPT | Performed by: EMERGENCY MEDICINE

## 2024-04-24 PROCEDURE — 99284 EMERGENCY DEPT VISIT MOD MDM: CPT | Mod: 25

## 2024-04-24 PROCEDURE — 25000003 PHARM REV CODE 250: Performed by: EMERGENCY MEDICINE

## 2024-04-24 PROCEDURE — 82962 GLUCOSE BLOOD TEST: CPT

## 2024-04-24 PROCEDURE — 96361 HYDRATE IV INFUSION ADD-ON: CPT

## 2024-04-24 PROCEDURE — 93010 ELECTROCARDIOGRAM REPORT: CPT | Mod: ,,, | Performed by: INTERNAL MEDICINE

## 2024-04-24 RX ORDER — OXYCODONE AND ACETAMINOPHEN 10; 325 MG/1; MG/1
1 TABLET ORAL
Status: COMPLETED | OUTPATIENT
Start: 2024-04-24 | End: 2024-04-24

## 2024-04-24 RX ORDER — HYDROMORPHONE HYDROCHLORIDE 2 MG/ML
2 INJECTION, SOLUTION INTRAMUSCULAR; INTRAVENOUS; SUBCUTANEOUS
Status: COMPLETED | OUTPATIENT
Start: 2024-04-24 | End: 2024-04-24

## 2024-04-24 RX ORDER — NALOXONE HYDROCHLORIDE 4 MG/.1ML
1 SPRAY NASAL ONCE
Qty: 1 EACH | Refills: 0 | Status: SHIPPED | OUTPATIENT
Start: 2024-04-24 | End: 2024-04-24

## 2024-04-24 RX ORDER — OXYCODONE AND ACETAMINOPHEN 10; 325 MG/1; MG/1
1 TABLET ORAL EVERY 6 HOURS PRN
Qty: 12 TABLET | Refills: 0 | Status: ON HOLD | OUTPATIENT
Start: 2024-04-24 | End: 2024-05-07 | Stop reason: HOSPADM

## 2024-04-24 RX ADMIN — OXYCODONE AND ACETAMINOPHEN 1 TABLET: 10; 325 TABLET ORAL at 10:04

## 2024-04-24 RX ADMIN — SODIUM CHLORIDE 500 ML: 9 INJECTION, SOLUTION INTRAVENOUS at 08:04

## 2024-04-24 RX ADMIN — HYDROMORPHONE HYDROCHLORIDE 2 MG: 2 INJECTION INTRAMUSCULAR; INTRAVENOUS; SUBCUTANEOUS at 08:04

## 2024-04-25 LAB
OHS QRS DURATION: 94 MS
OHS QRS DURATION: 94 MS
OHS QTC CALCULATION: 447 MS
OHS QTC CALCULATION: 455 MS

## 2024-04-25 NOTE — DISCHARGE INSTRUCTIONS
Call both your primary care doctor and your pain management doctor tomorrow and let them know that your pain was so severe that he had to come to the emergency department to be treated.  It is likely that you will need continued pain medication home at least over the next few weeks given that you have been on opiate medications for the past 10 years and recently on high doses.  Always take the medications prescribed, do not take extra as this can cause you to quit breathing.  Do not take any additional pain medications tonight.  Return for new or worsening symptoms.

## 2024-04-25 NOTE — ED TRIAGE NOTES
Pt presents to the ER with complaints of severe pain in the upper to lower back since surgery for a compression fracture last week. Pt reports passing out due to pain; was found to be diaphoretic in triage.

## 2024-04-25 NOTE — ED NOTES
Pt resting quietly on stretcher; reporting significant relief in pain since receiving medication; currently rating pain as a 4/10.  Pt updated on wait time for test results.  Bed locked in lowest position; side rails up and locked x 2; call light, bedside table, and personal belongings within reach.  Will continue to monitor pt.

## 2024-04-25 NOTE — ED NOTES
Pt with an oxygen saturation in the upper 80's shortly after receiving medication. Pt placed on 2 liters of supplemental oxygen via nasal cannula; remains on continuous pulse ox monitoring; monitoring ongoing.

## 2024-04-25 NOTE — ED PROVIDER NOTES
"Encounter Date: 4/24/2024    SCRIBE #1 NOTE: I, Lois Bazan, am scribing for, and in the presence of,  Annamaria Rivera MD. I have scribed the following portions of the note -   SCRIBE #2 NOTE: IMel, am scribing for, and in the presence of,  Annamaria Rivera MD.     History     Chief Complaint   Patient presents with    Back Pain     Lower to upper back l3 to l5 then t11 had surgery on Monday, pt appears diaphoretic reports passing out from the pain and also c/o RLQ abd pain. Been on hydrocodone chronically and norco at home with no relief.      Patient is a 68 y.o. male with a history of L3 and T11 compression fractures, 2 days s/p kyphoplasty, complaining of severe back pain. He reports a near-syncopal episode due to pain while sitting on the edge of his bed at home today. He explains that he saw spots and fell onto his bed. He endorses diaphoresis today. Pt states that he has been on a chronic high dosage of narcotics, so he decided to get the kyphoplasty. After the procedure, he states that he was given Tylenol and 1 Oxycontin. However, he has had difficulty getting pain medication following his procedure because his PCP wants his pain medications to be switched to Dr. Stern with pain management, but Dr. Stern has not prescribed him more pain medications following the procedure. Yesterday, he called Dr. Stern's nurse after taking double his dosage of pain medicine, and was told to come to the ED if his pain continued to be so severe. Pt has not had anything for pain since 5 hours prior to arrival. Pt also endorses sciatic nerve pain, "like being kicked with a boot," but denies leg numbness. He states that he recently finished taking Prednisone after long-term usage.    Pt additionally endorses right lower quadrant discomfort for the past 2 weeks. He reports nausea for the past 10 hours with associated vomiting. Pt states that he was on Mounjaro, but discontinued after experiencing severe constipation. " He states that he lost his appetite and has lost approximately 15 pounds. He reports that he began to have diarrhea last night. He denies trouble urinating. Pt states that he has not had insulin in 5-6 days, but his sugar has been good.    The history is provided by the patient and medical records.     Review of patient's allergies indicates:   Allergen Reactions    Enbrel [etanercept] Shortness Of Breath     CHF    Nsaids (non-steroidal anti-inflammatory drug) Other (See Comments)     hypertention  Other reaction(s): Other (See Comments)  hypertention  HTN  Patient states he tolerates anti inflammatory eye drops    Statins-hmg-coa reductase inhibitors Other (See Comments)     Heart arrythemias  Other reaction(s): Other (See Comments)  Heart arrythemias  Joint pain and cardiac arrythmias    Pcn [penicillins] Rash     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    forehead    Stroke     TIA     Past Surgical History:   Procedure Laterality Date    CATARACT EXTRACTION W/  INTRAOCULAR LENS IMPLANT Right 3/28/2024    Procedure: EXTRACTION, CATARACT, WITH IOL INSERTION;  Surgeon: Hans Woodward MD;  Location: [facility] OR;  Service: Ophthalmology;  Laterality: Right;    CORONARY ANGIOGRAPHY N/A 3/10/2021    Procedure: ANGIOGRAM, CORONARY ARTERY - right radial;  Surgeon: Sheamr Dempsey MD;  Location: [facility] CATH LAB;  Service: Cardiology;  Laterality: N/A;    CORONARY STENT PLACEMENT  03/10/2021    prox-mid RCA Marshal 4.5 x 26 mm, 4.5 x 12 mm    FIXATION KYPHOPLASTY N/A 4/22/2024    Procedure: KYPHOPLASTY;  Surgeon: Stefan Stern MD;   Location: [facility] OR;  Service: Pain Management;  Laterality: N/A;  NO PROPOFOL    INCISION AND DRAINAGE OF SCROTUM N/A 11/15/2022    Procedure: INCISION AND DRAINAGE, SCROTUM;  Surgeon: William Hatch MD;  Location: [facility] OR;  Service: Urology;  Laterality: N/A;    INCISION AND DRAINAGE OF SCROTUM N/A 11/17/2022    Procedure: INCISION AND DRAINAGE, SCROTUM;  Surgeon: William Hatch MD;  Location: [facility] OR;  Service: Urology;  Laterality: N/A;    LUNG LOBECTOMY Right 2008    RUL lobectomy after removal of atrial myxoma    PLEURA BIOPSY      RESECTION OF ATRIAL MYXOMA  2007     Family History   Problem Relation Name Age of Onset    Hodgkin's lymphoma Mother      Diabetes type II Mother      Kidney failure Father      Diabetes type I Father      Cancer Sister       Social History     Tobacco Use    Smoking status: Every Day     Current packs/day: 1.00     Average packs/day: 1 pack/day for 35.0 years (35.0 ttl pk-yrs)     Types: Cigarettes    Smokeless tobacco: Never   Substance Use Topics    Alcohol use: Not Currently    Drug use: No     Review of Systems   Constitutional:  Positive for appetite change and diaphoresis. Negative for fever.   HENT:  Negative for congestion.    Eyes:  Negative for redness.   Respiratory:  Negative for shortness of breath.    Cardiovascular:  Negative for chest pain.   Gastrointestinal:  Positive for abdominal pain, diarrhea, nausea and vomiting.   Genitourinary:  Negative for dysuria.   Musculoskeletal:  Positive for back pain.   Skin:  Negative for rash.   Neurological:  Positive for syncope. Negative for headaches.   Psychiatric/Behavioral:  Negative for confusion.        Physical Exam     Initial Vitals [04/24/24 1952]   BP Pulse Resp Temp SpO2   (!) 91/55 105 18 98.2 °F (36.8 °C) (!) 94 %      MAP       --         Physical Exam    Nursing note and vitals reviewed.  Constitutional: He appears well-developed and well-nourished.   Uncomfortable appearing. Pale and diaphoretic.    HENT:   Head: Normocephalic and atraumatic.   Eyes: Conjunctivae and EOM are normal. Pupils are equal, round, and reactive to light.   Neck: Neck supple.   Normal range of motion.  Cardiovascular:  Normal rate, regular rhythm, normal heart sounds and intact distal pulses.     Exam reveals no gallop and no friction rub.       No murmur heard.  Pulmonary/Chest: Breath sounds normal. No stridor. No respiratory distress. He has no wheezes. He has no rhonchi. He has no rales. He exhibits no tenderness.   Abdominal: Abdomen is soft. Bowel sounds are normal. He exhibits no distension. There is abdominal tenderness.   Mild right lower abdominal tenderness, worse with inspiration. Normal bowel sounds. There is no rebound and no guarding.   Musculoskeletal:         General: Edema present. Normal range of motion.      Cervical back: Normal range of motion and neck supple.      Comments: Edema in bilateral lower extremities with chronic skin changes.     Neurological: He is alert and oriented to person, place, and time. He has normal strength. No cranial nerve deficit. GCS score is 15. GCS eye subscore is 4. GCS verbal subscore is 5. GCS motor subscore is 6.   Skin: Skin is warm and dry. Capillary refill takes less than 2 seconds.   2 wounds on lower thoracic and lower lumbar spine. No drainage, bleeding, erythema, or surrounding swelling.    Psychiatric: He has a normal mood and affect.         ED Course   Procedures  Labs Reviewed   CBC W/ AUTO DIFFERENTIAL - Abnormal; Notable for the following components:       Result Value    RBC 4.50 (*)      (*)     MCH 33.6 (*)     Immature Granulocytes 0.9 (*)     Immature Grans (Abs) 0.07 (*)     All other components within normal limits   COMPREHENSIVE METABOLIC PANEL - Abnormal; Notable for the following components:    Glucose 174 (*)     Albumin 2.8 (*)     All other components within normal limits   C-REACTIVE PROTEIN - Abnormal; Notable for the following components:     CRP 63.0 (*)     All other components within normal limits   POCT GLUCOSE - Abnormal; Notable for the following components:    POCT Glucose 170 (*)     All other components within normal limits     EKG Readings: (Independently Interpreted)   Normal sinus rhythm. Rate of 96. No ST abnormalities. LAFB.     ECG Results              EKG 12-lead (Final result)        Collection Time Result Time QRS Duration OHS QTC Calculation    04/24/24 20:25:20 04/25/24 13:16:37 94 447                     Final result by Interface, Lab In [facility] (04/25/24 13:16:43)                   Narrative:    Test Reason : R42,    Vent. Rate : 096 BPM     Atrial Rate : 096 BPM     P-R Int : 144 ms          QRS Dur : 094 ms      QT Int : 354 ms       P-R-T Axes : 000 -58 058 degrees     QTc Int : 447 ms    Normal sinus rhythm  Left anterior fascicular block  Cannot rule out Anterior infarct ,age undetermined  Abnormal ECG    Confirmed by Laila Norton MD ([id number]) on 4/25/2024 1:16:33 PM    Referred By: [email or web address]   SELF           Confirmed By:Laila Norton MD                                  Imaging Results    None          Medications   sodium chloride 0.9% bolus 500 mL 500 mL (0 mLs Intravenous Stopped 4/24/24 2159)   HYDROmorphone (PF) injection 2 mg (2 mg Intravenous Given 4/24/24 2059)   oxyCODONE-acetaminophen  mg per tablet 1 tablet (1 tablet Oral Given 4/24/24 2239)     Medical Decision Making  Initial impression: This is a 68 year old male who presents with severe back pain despite recent kyphoplasty, with nausea, vomiting, abdominal pain, and diarrhea. Pt was receiving pain medications from PCP, who intended to transfer to pain management, but pain management physician also not prescribing. Plan labs, pain control, CT.    Differential diagnosis: Muscular pain, herniated disc, spine fracture, intra-abdominal causes and urinary tract infection, dehydration.     GERD, intestinal spasm, gastroenteritis, gastritis, ulcer, cholecystitis,  cholelithiasis, gallstones, pancreatitis, ileus, small bowel obstruction, appendicitis, diverticulitis, colitis, constipation, intestinal gas pain.          Amount and/or Complexity of Data Reviewed  Labs: ordered. Decision-making details documented in ED Course.    Risk  Prescription drug management.            Scribe Attestation:   Scribe #1: I performed the above scribed service and the documentation accurately describes the services I performed. I attest to the accuracy of the note.  Scribe #2: I performed the above scribed service and the documentation accurately describes the services I performed. I attest to the accuracy of the note.      Physician Attestation for Scribe: I, Annamaria Rivera MD, reviewed documentation as scribed in my presence, which is both accurate and complete.    ED Course as of 24 1323    Reports immense relief of pain, resting comfortably.   [LF]   2237 CRP(!): 63.0 [LF]   2237 WBC: 7.55 [LF]   2237 Glucose(!): 174 [LF]      ED Course User Index  [LF] Annamaria Rivera MD                   Patient improved with treatment in the emergency department and comfortable going home. Discussed reasons to return and importance of followup.  Patient understands that the emergency visit today is primarily to address immediate concerns and to rule out emergent cause of symptoms and that they may require further workup and evaluation as an outpatient. All questions addressed and patient given discharge instructions and followup information.           Clinical Impression:  Final diagnoses:  [R42] Dizziness  [M54.6] Acute midline thoracic back pain (Primary)  [Z98.890] S/P kyphoplasty  [M54.50] Lumbar spine pain          ED Disposition Condition    Discharge Stable          ED Prescriptions       Medication Sig Dispense Start Date End Date Auth. Provider    oxyCODONE-acetaminophen (PERCOCET)  mg per tablet () Take 1 tablet by mouth every 6 (six) hours as needed for  Pain. 12 tablet 2024 Annamaria Rivera MD    naloxone (NARCAN) 4 mg/actuation Spry () 1 spray (4 mg total) by Nasal route once. for 1 dose 1 each 2024 Annamaria Rivera MD          Follow-up Information    None          Annamaria Rivera MD  [id number]

## 2024-04-25 NOTE — ED NOTES
Pt c/o seeing spots in waiting room, believes he is about to pass out. Pt appears more diaphoretic and pale, pt brought into ED.

## 2024-04-26 ENCOUNTER — HOSPITAL ENCOUNTER (INPATIENT)
Facility: HOSPITAL | Age: 69
LOS: 10 days | Discharge: NURSING, REHAB OR LONG-TERM CARE | DRG: 543 | End: 2024-05-08
Attending: EMERGENCY MEDICINE | Admitting: HOSPITALIST
Payer: MEDICARE

## 2024-04-26 ENCOUNTER — PATIENT MESSAGE (OUTPATIENT)
Dept: ADMINISTRATIVE | Facility: OTHER | Age: 69
End: 2024-04-26
Payer: MEDICARE

## 2024-04-26 ENCOUNTER — TELEPHONE (OUTPATIENT)
Dept: ADMINISTRATIVE | Facility: OTHER | Age: 69
End: 2024-04-26
Payer: MEDICARE

## 2024-04-26 ENCOUNTER — OFFICE VISIT (OUTPATIENT)
Dept: PAIN MEDICINE | Facility: CLINIC | Age: 69
End: 2024-04-26
Payer: MEDICARE

## 2024-04-26 VITALS
TEMPERATURE: 98 F | RESPIRATION RATE: 18 BRPM | DIASTOLIC BLOOD PRESSURE: 69 MMHG | WEIGHT: 201.25 LBS | HEART RATE: 126 BPM | BODY MASS INDEX: 28.07 KG/M2 | OXYGEN SATURATION: 98 % | SYSTOLIC BLOOD PRESSURE: 107 MMHG

## 2024-04-26 DIAGNOSIS — S32.019A CLOSED FRACTURE OF FIRST LUMBAR VERTEBRA, UNSPECIFIED FRACTURE MORPHOLOGY, INITIAL ENCOUNTER: ICD-10-CM

## 2024-04-26 DIAGNOSIS — R94.31 QT PROLONGATION: ICD-10-CM

## 2024-04-26 DIAGNOSIS — I47.10 SUPRAVENTRICULAR TACHYCARDIA: ICD-10-CM

## 2024-04-26 DIAGNOSIS — I50.32 CHRONIC DIASTOLIC HEART FAILURE: ICD-10-CM

## 2024-04-26 DIAGNOSIS — Z79.4 TYPE 2 DIABETES MELLITUS WITH HYPERGLYCEMIA, WITH LONG-TERM CURRENT USE OF INSULIN: ICD-10-CM

## 2024-04-26 DIAGNOSIS — S32.010A COMPRESSION FRACTURE OF L1 VERTEBRA, INITIAL ENCOUNTER: Primary | ICD-10-CM

## 2024-04-26 DIAGNOSIS — M05.79 RHEUMATOID ARTHRITIS INVOLVING MULTIPLE SITES WITH POSITIVE RHEUMATOID FACTOR: ICD-10-CM

## 2024-04-26 DIAGNOSIS — E78.5 HYPERLIPIDEMIA, UNSPECIFIED HYPERLIPIDEMIA TYPE: ICD-10-CM

## 2024-04-26 DIAGNOSIS — R52 INTRACTABLE PAIN: Primary | ICD-10-CM

## 2024-04-26 DIAGNOSIS — S22.080D CLOSED COMPRESSION FRACTURE OF THORACOLUMBAR VERTEBRA WITH ROUTINE HEALING, SUBSEQUENT ENCOUNTER: ICD-10-CM

## 2024-04-26 DIAGNOSIS — R52 BREAKTHROUGH PAIN: ICD-10-CM

## 2024-04-26 DIAGNOSIS — K59.00 CONSTIPATION, UNSPECIFIED CONSTIPATION TYPE: ICD-10-CM

## 2024-04-26 DIAGNOSIS — N20.0 NEPHROLITHIASIS: ICD-10-CM

## 2024-04-26 DIAGNOSIS — R76.8 HEPATITIS B CORE ANTIBODY POSITIVE: ICD-10-CM

## 2024-04-26 DIAGNOSIS — E11.65 TYPE 2 DIABETES MELLITUS WITH HYPERGLYCEMIA, WITH LONG-TERM CURRENT USE OF INSULIN: ICD-10-CM

## 2024-04-26 DIAGNOSIS — R07.9 CHEST PAIN: ICD-10-CM

## 2024-04-26 DIAGNOSIS — R00.0 TACHYCARDIA: ICD-10-CM

## 2024-04-26 DIAGNOSIS — S32.010D CLOSED COMPRESSION FRACTURE OF THORACOLUMBAR VERTEBRA WITH ROUTINE HEALING, SUBSEQUENT ENCOUNTER: ICD-10-CM

## 2024-04-26 DIAGNOSIS — I10 ESSENTIAL HYPERTENSION: ICD-10-CM

## 2024-04-26 DIAGNOSIS — S32.009A LUMBAR VERTEBRAL FRACTURE: ICD-10-CM

## 2024-04-26 PROBLEM — R19.7 DIARRHEA OF PRESUMED INFECTIOUS ORIGIN: Status: ACTIVE | Noted: 2024-04-26

## 2024-04-26 LAB
ALBUMIN SERPL BCP-MCNC: 2.8 G/DL (ref 3.5–5.2)
ALLENS TEST: NORMAL
ALP SERPL-CCNC: 138 U/L (ref 55–135)
ALT SERPL W/O P-5'-P-CCNC: 10 U/L (ref 10–44)
ANION GAP SERPL CALC-SCNC: 15 MMOL/L (ref 8–16)
AST SERPL-CCNC: 13 U/L (ref 10–40)
BASOPHILS # BLD AUTO: 0.07 K/UL (ref 0–0.2)
BASOPHILS NFR BLD: 0.8 % (ref 0–1.9)
BILIRUB SERPL-MCNC: 0.4 MG/DL (ref 0.1–1)
BNP SERPL-MCNC: 157 PG/ML (ref 0–99)
BUN SERPL-MCNC: 9 MG/DL (ref 6–30)
BUN SERPL-MCNC: 9 MG/DL (ref 8–23)
CALCIUM SERPL-MCNC: 9 MG/DL (ref 8.7–10.5)
CHLORIDE SERPL-SCNC: 103 MMOL/L (ref 95–110)
CHLORIDE SERPL-SCNC: 106 MMOL/L (ref 95–110)
CO2 SERPL-SCNC: 16 MMOL/L (ref 23–29)
CREAT SERPL-MCNC: 0.7 MG/DL (ref 0.5–1.4)
CREAT SERPL-MCNC: 0.8 MG/DL (ref 0.5–1.4)
CRP SERPL-MCNC: 29.4 MG/L (ref 0–8.2)
DIFFERENTIAL METHOD BLD: ABNORMAL
EOSINOPHIL # BLD AUTO: 0.1 K/UL (ref 0–0.5)
EOSINOPHIL NFR BLD: 1.2 % (ref 0–8)
ERYTHROCYTE [DISTWIDTH] IN BLOOD BY AUTOMATED COUNT: 12.1 % (ref 11.5–14.5)
ERYTHROCYTE [SEDIMENTATION RATE] IN BLOOD BY PHOTOMETRIC METHOD: 41 MM/HR (ref 0–23)
EST. GFR  (NO RACE VARIABLE): >60 ML/MIN/1.73 M^2
GLUCOSE SERPL-MCNC: 148 MG/DL (ref 70–110)
GLUCOSE SERPL-MCNC: 153 MG/DL (ref 70–110)
HCT VFR BLD AUTO: 47.5 % (ref 40–54)
HCT VFR BLD CALC: 52 %PCV (ref 36–54)
HGB BLD-MCNC: 16 G/DL (ref 14–18)
IMM GRANULOCYTES # BLD AUTO: 0.06 K/UL (ref 0–0.04)
IMM GRANULOCYTES NFR BLD AUTO: 0.7 % (ref 0–0.5)
LDH SERPL L TO P-CCNC: 1.06 MMOL/L (ref 0.5–2.2)
LYMPHOCYTES # BLD AUTO: 1.6 K/UL (ref 1–4.8)
LYMPHOCYTES NFR BLD: 17.3 % (ref 18–48)
MAGNESIUM SERPL-MCNC: 1.7 MG/DL (ref 1.6–2.6)
MCH RBC QN AUTO: 33.8 PG (ref 27–31)
MCHC RBC AUTO-ENTMCNC: 33.7 G/DL (ref 32–36)
MCV RBC AUTO: 100 FL (ref 82–98)
MONOCYTES # BLD AUTO: 1 K/UL (ref 0.3–1)
MONOCYTES NFR BLD: 10.5 % (ref 4–15)
NEUTROPHILS # BLD AUTO: 6.3 K/UL (ref 1.8–7.7)
NEUTROPHILS NFR BLD: 69.5 % (ref 38–73)
NRBC BLD-RTO: 0 /100 WBC
PHOSPHATE SERPL-MCNC: 3.4 MG/DL (ref 2.7–4.5)
PLATELET # BLD AUTO: 288 K/UL (ref 150–450)
PMV BLD AUTO: 11.6 FL (ref 9.2–12.9)
POC IONIZED CALCIUM: 1.21 MMOL/L (ref 1.06–1.42)
POC TCO2 (MEASURED): 19 MMOL/L (ref 23–29)
POTASSIUM BLD-SCNC: 3.8 MMOL/L (ref 3.5–5.1)
POTASSIUM SERPL-SCNC: 3.8 MMOL/L (ref 3.5–5.1)
PROT SERPL-MCNC: 6.4 G/DL (ref 6–8.4)
RBC # BLD AUTO: 4.73 M/UL (ref 4.6–6.2)
SAMPLE: ABNORMAL
SAMPLE: NORMAL
SITE: NORMAL
SODIUM BLD-SCNC: 138 MMOL/L (ref 136–145)
SODIUM SERPL-SCNC: 137 MMOL/L (ref 136–145)
TROPONIN I SERPL DL<=0.01 NG/ML-MCNC: <0.006 NG/ML (ref 0–0.03)
TSH SERPL DL<=0.005 MIU/L-ACNC: 1.82 UIU/ML (ref 0.4–4)
WBC # BLD AUTO: 9.02 K/UL (ref 3.9–12.7)

## 2024-04-26 PROCEDURE — 96374 THER/PROPH/DIAG INJ IV PUSH: CPT

## 2024-04-26 PROCEDURE — 83735 ASSAY OF MAGNESIUM: CPT

## 2024-04-26 PROCEDURE — 3288F FALL RISK ASSESSMENT DOCD: CPT | Mod: CPTII,S$GLB,, | Performed by: NURSE PRACTITIONER

## 2024-04-26 PROCEDURE — 99213 OFFICE O/P EST LOW 20 MIN: CPT | Mod: S$GLB,,, | Performed by: NURSE PRACTITIONER

## 2024-04-26 PROCEDURE — 1159F MED LIST DOCD IN RCRD: CPT | Mod: CPTII,S$GLB,, | Performed by: NURSE PRACTITIONER

## 2024-04-26 PROCEDURE — 80053 COMPREHEN METABOLIC PANEL: CPT

## 2024-04-26 PROCEDURE — G0378 HOSPITAL OBSERVATION PER HR: HCPCS

## 2024-04-26 PROCEDURE — 63600175 PHARM REV CODE 636 W HCPCS: Performed by: INTERNAL MEDICINE

## 2024-04-26 PROCEDURE — 87040 BLOOD CULTURE FOR BACTERIA: CPT

## 2024-04-26 PROCEDURE — 85652 RBC SED RATE AUTOMATED: CPT | Performed by: EMERGENCY MEDICINE

## 2024-04-26 PROCEDURE — 87449 NOS EACH ORGANISM AG IA: CPT | Performed by: INTERNAL MEDICINE

## 2024-04-26 PROCEDURE — 87324 CLOSTRIDIUM AG IA: CPT | Performed by: INTERNAL MEDICINE

## 2024-04-26 PROCEDURE — 80048 BASIC METABOLIC PNL TOTAL CA: CPT | Mod: XB

## 2024-04-26 PROCEDURE — 3078F DIAST BP <80 MM HG: CPT | Mod: CPTII,S$GLB,, | Performed by: NURSE PRACTITIONER

## 2024-04-26 PROCEDURE — 99285 EMERGENCY DEPT VISIT HI MDM: CPT | Mod: 25

## 2024-04-26 PROCEDURE — 1160F RVW MEDS BY RX/DR IN RCRD: CPT | Mod: CPTII,S$GLB,, | Performed by: NURSE PRACTITIONER

## 2024-04-26 PROCEDURE — 3061F NEG MICROALBUMINURIA REV: CPT | Mod: CPTII,S$GLB,, | Performed by: NURSE PRACTITIONER

## 2024-04-26 PROCEDURE — 4010F ACE/ARB THERAPY RXD/TAKEN: CPT | Mod: CPTII,S$GLB,, | Performed by: NURSE PRACTITIONER

## 2024-04-26 PROCEDURE — 86140 C-REACTIVE PROTEIN: CPT | Performed by: EMERGENCY MEDICINE

## 2024-04-26 PROCEDURE — 96361 HYDRATE IV INFUSION ADD-ON: CPT

## 2024-04-26 PROCEDURE — 83880 ASSAY OF NATRIURETIC PEPTIDE: CPT

## 2024-04-26 PROCEDURE — 84443 ASSAY THYROID STIM HORMONE: CPT

## 2024-04-26 PROCEDURE — 99900035 HC TECH TIME PER 15 MIN (STAT)

## 2024-04-26 PROCEDURE — 3066F NEPHROPATHY DOC TX: CPT | Mod: CPTII,S$GLB,, | Performed by: NURSE PRACTITIONER

## 2024-04-26 PROCEDURE — 96376 TX/PRO/DX INJ SAME DRUG ADON: CPT

## 2024-04-26 PROCEDURE — 84100 ASSAY OF PHOSPHORUS: CPT

## 2024-04-26 PROCEDURE — 63600175 PHARM REV CODE 636 W HCPCS

## 2024-04-26 PROCEDURE — 25000003 PHARM REV CODE 250: Performed by: INTERNAL MEDICINE

## 2024-04-26 PROCEDURE — 84484 ASSAY OF TROPONIN QUANT: CPT

## 2024-04-26 PROCEDURE — 83605 ASSAY OF LACTIC ACID: CPT

## 2024-04-26 PROCEDURE — 99999 PR PBB SHADOW E&M-EST. PATIENT-LVL V: CPT | Mod: PBBFAC,,, | Performed by: NURSE PRACTITIONER

## 2024-04-26 PROCEDURE — 3074F SYST BP LT 130 MM HG: CPT | Mod: CPTII,S$GLB,, | Performed by: NURSE PRACTITIONER

## 2024-04-26 PROCEDURE — 1125F AMNT PAIN NOTED PAIN PRSNT: CPT | Mod: CPTII,S$GLB,, | Performed by: NURSE PRACTITIONER

## 2024-04-26 PROCEDURE — 1101F PT FALLS ASSESS-DOCD LE1/YR: CPT | Mod: CPTII,S$GLB,, | Performed by: NURSE PRACTITIONER

## 2024-04-26 PROCEDURE — 85025 COMPLETE CBC W/AUTO DIFF WBC: CPT

## 2024-04-26 PROCEDURE — 3051F HG A1C>EQUAL 7.0%<8.0%: CPT | Mod: CPTII,S$GLB,, | Performed by: NURSE PRACTITIONER

## 2024-04-26 PROCEDURE — 3008F BODY MASS INDEX DOCD: CPT | Mod: CPTII,S$GLB,, | Performed by: NURSE PRACTITIONER

## 2024-04-26 RX ORDER — HYDROMORPHONE HYDROCHLORIDE 1 MG/ML
1 INJECTION, SOLUTION INTRAMUSCULAR; INTRAVENOUS; SUBCUTANEOUS
Status: COMPLETED | OUTPATIENT
Start: 2024-04-26 | End: 2024-04-26

## 2024-04-26 RX ORDER — MAGNESIUM SULFATE HEPTAHYDRATE 40 MG/ML
2 INJECTION, SOLUTION INTRAVENOUS ONCE
Status: COMPLETED | OUTPATIENT
Start: 2024-04-26 | End: 2024-04-27

## 2024-04-26 RX ORDER — HYDROMORPHONE HYDROCHLORIDE 1 MG/ML
1 INJECTION, SOLUTION INTRAMUSCULAR; INTRAVENOUS; SUBCUTANEOUS EVERY 4 HOURS PRN
Status: DISCONTINUED | OUTPATIENT
Start: 2024-04-26 | End: 2024-04-27

## 2024-04-26 RX ORDER — POTASSIUM CHLORIDE 20 MEQ/1
20 TABLET, EXTENDED RELEASE ORAL ONCE
Status: COMPLETED | OUTPATIENT
Start: 2024-04-26 | End: 2024-04-26

## 2024-04-26 RX ORDER — OXYCODONE HYDROCHLORIDE 10 MG/1
10 TABLET ORAL EVERY 4 HOURS PRN
Status: DISCONTINUED | OUTPATIENT
Start: 2024-04-26 | End: 2024-05-01

## 2024-04-26 RX ADMIN — HYDROMORPHONE HYDROCHLORIDE 1 MG: 1 INJECTION, SOLUTION INTRAMUSCULAR; INTRAVENOUS; SUBCUTANEOUS at 05:04

## 2024-04-26 RX ADMIN — HYDROMORPHONE HYDROCHLORIDE 1 MG: 1 INJECTION, SOLUTION INTRAMUSCULAR; INTRAVENOUS; SUBCUTANEOUS at 08:04

## 2024-04-26 RX ADMIN — OXYCODONE HYDROCHLORIDE 10 MG: 10 TABLET ORAL at 10:04

## 2024-04-26 RX ADMIN — HYDROMORPHONE HYDROCHLORIDE 1 MG: 1 INJECTION, SOLUTION INTRAMUSCULAR; INTRAVENOUS; SUBCUTANEOUS at 11:04

## 2024-04-26 RX ADMIN — MAGNESIUM SULFATE HEPTAHYDRATE 2 G: 40 INJECTION, SOLUTION INTRAVENOUS at 11:04

## 2024-04-26 RX ADMIN — POTASSIUM CHLORIDE 20 MEQ: 1500 TABLET, EXTENDED RELEASE ORAL at 11:04

## 2024-04-26 RX ADMIN — SODIUM CHLORIDE, POTASSIUM CHLORIDE, SODIUM LACTATE AND CALCIUM CHLORIDE 1000 ML: 600; 310; 30; 20 INJECTION, SOLUTION INTRAVENOUS at 05:04

## 2024-04-26 NOTE — Clinical Note
Diagnosis: Lumbar vertebral fracture [723553]   Future Attending Provider: CINDI MIDDLETON [[id number]]

## 2024-04-26 NOTE — TELEPHONE ENCOUNTER
Left voice message for patient to return call to schedule appointment from referral to [facility]oration department. Contact number provided, and My Chart message to be sent.  Tiff VALDEZ [phone]

## 2024-04-26 NOTE — PROGRESS NOTES
Chronic patient Established Note (Follow up visit)      SUBJECTIVE:    Interval History 4/26/2024:  The patient returns to clinic today for follow up of back pain. He is s/p T11 and L3 kyphoplasty on 4/22/2024. He felt better on Tuesday but then had worsened pain on Wednesday. He reports severe low back pain. He denies any radicular leg pain. His pain is worse with moving from sitting to standing. He cannot lay in a comfortable position. He has been to the ER twice this week. He also notes severe diarrhea. He denies any fever, chills, or drainage. His pain today is 10/10.    INTERVAL HISTORY 4/11/2024:  Nithin Wagner presents to the clinic for a follow-up appointment for chronic pain. Current pain intensity is 10/10.  Since the last visit, Nithin Wagner states:  he continues to have mid back and lower back pain. He also has sciatica pain, but feels the axial lower back pain tends to be worst.  It continues to affect his quality of life.  He is scheduled to have a kyphoplasty next Monday.    PRIOR HISTORY:   Nithin Wagner presents to the clinic for the evaluation of mid back, low back, and bilateral legs pain. The pain started 5 weeks ago following holding objects and adjusting to catch one from slipping and symptoms have been worsening.  He reports that he went to urgent care at that time and was given flexeril with no improvement. The pain is located in the mid back, low back area and radiates to the bilateral shooting leg pain. The thoracic pain is constant and continues to bother him.  He feels he is able to control the sciatica pain with oral medications.  The pain is described as sharp and shooting and is rated as 7/10. The pain is rated with a score of  4/10 on the BEST day and a score of 10/10 on the WORST day.  Symptoms interfere with daily activity and sleeping. The pain is exacerbated by moving around, trying to stand up, taking more than a few steps.  The pain is mitigated by laying down and medications.  The patient reports 1.5 hours of uninterrupted sleep per night.    Patient denies urinary incontinence and bowel incontinence. He admits to weakness in his legs. He reports if he bends his left knee, he will collapse.  He was recently diagnosed with osteoporosis. He has a history of rheumatoid arthritis, 3 stents, PE, right atrial myxoma, polycythemia. He has been on high doses of prednisone and has been trying to wean off of this, but his arthritic pain gets worse.    Physical Therapy/Home Exercise: no      Pain Medications:   - Norco 10-325mg (takes after 4-5 hours)   - Dilaudid 4mg (takes in the AM)   - gabapentin 300mg (doesn't take it - due to risk of side effects)     report:  Reviewed and consistent with medication use as prescribed.      Pain Procedures:   4/22/2024- T11 and L3 kyphoplasty      Imaging:   CT Abdomen and Pelvis 4/25/2024:  FINDINGS:  Images of the lower thorax are remarkable for bilateral bandlike atelectasis/scarring.     The liver is mildly hypoattenuating, may reflect sequela of contrast phase however correlation with LFTs as warranted to exclude changes of steatosis.  The spleen and gallbladder are unremarkable.  There is nodular thickening of the bilateral adrenal glands, nonspecific.  The stomach is decompressed without wall thickening.  The portal vein, splenic vein, SMV, celiac axis and SMA all are patent.  There is atrophic change of the pancreas without pancreatic ductal dilation.  No significant abdominal lymphadenopathy.     There is bilateral perinephric fat stranding, left greater than right.  The kidneys enhance somewhat asymmetrically noting some delayed excretion by the left kidney.  There is bilateral nonobstructive nephrolithiasis.  There is a cyst arising from the interpolar region of the right kidney measuring 1.7 cm.  The right ureter is unremarkable without calculi seen.  There are subcentimeter low attenuating lesions arising from the left kidney, too small for  characterization.  The left ureter is unremarkable without calculi seen.  The urinary bladder is decompressed without wall thickening.  The prostate is not enlarged.     There is liquid stool within the rectum and scattered throughout the large bowel.  There are a few scattered colonic diverticula without inflammation.  The terminal ileum is unremarkable.  The appendix is unremarkable.  There is scattered fluid content within several small bowel loops, without significant distension.  There are several scattered shotty periaortic, pericaval, and mesenteric lymph nodes.  There is mild ectasia of the infrarenal abdominal aorta noting atherosclerotic calcification throughout the aorta and its branches.  No focal organized pelvic fluid collection.  There are bilateral fat containing inguinal hernias.     There is osteopenia.  There are degenerative changes of the spine.  There is vertebroplasty change.  There is endplate height loss of L1 noting associated vacuum disc phenomenon.  This is new since MRI 03/23/2024.  There is moderate spinal canal stenosis at that level.  No significant inguinal lymphadenopathy.     Impression:     This report was flagged in Epic as abnormal.     1. Liquid stool throughout the large bowel and within several small bowel loops, taken together, suggests diarrheal illness possibly in the setting of enteritis.  Correlation is advised.  2. Left perinephric fat stranding noting somewhat delayed excretion of contrast by the left kidney.  Correlation with urinalysis is recommended to exclude infection, differential would potentially include sequela of recently passed calculus.  3. Bilateral nonobstructive nephrolithiasis.  4. Possible hepatic steatosis, correlation with LFTs recommended.  5. Interval compression deformity involving the superior endplate of L1, new since prior MRI, correlation is advised.  6. Please see above for additional findings.    MRI LUMBAR SPINE WITHOUT CONTRAST      CLINICAL HISTORY:  Lumbar radiculopathy, no red flags, no prior management; Radiculopathy, lumbar region     TECHNIQUE:  Multiplanar, multisequence MR images were acquired from the thoracolumbar junction to the sacrum without the administration of contrast.     COMPARISON:  Radiographs 03/01/2024.     FINDINGS:  Lumbar spine alignment demonstrates dextroscoliosis with mild grade 1 retrolisthesis of L3 on L4.  No spondylolysis.  Prominent marrow edema at the inferior aspect of the L3 vertebral body with a superimposed linear hypointense band and associated irregularity and mild height loss of the inferior endplate.  Remaining vertebral body heights are well maintained.  No marrow signal abnormality to suggest an infiltrative process.     Multilevel degenerative disc space narrowing and desiccation most pronounced from L2-L3 through L4-L5.     Distal spinal cord demonstrates normal contour and signal intensity.  Cauda equina appears normal without findings to suggest arachnoiditis.  Conus medullaris terminates at L2.     Bilateral cortical renal cysts.  SI joints are symmetric.  Paraspinal musculature demonstrates normal bulk and signal intensity.     T12-L1: Prominent epidural fat.  No spinal canal stenosis.  No neural foraminal narrowing.     L1-L2: Left facet arthropathy and bilateral ligamentum flavum hypertrophy.  Prominent epidural fat.  No spinal canal stenosis.  No neural foraminal narrowing.     L2-L3: Circumferential disc bulge causes mass effect on the ventral thecal sac and lateral recesses and demonstrates a superimposed right foraminal/extraforaminal broad-based protrusion.  Bilateral facet arthropathy and bilateral ligamentum flavum hypertrophy.  Prominent epidural fat.  Findings contribute to mild right neural foraminal narrowing.  No spinal canal stenosis.     L3-L4: Circumferential disc bulge causes mass effect on the ventral thecal sac and lateral recesses and encroaches into the bilateral  foraminal zones.  Bilateral facet arthropathy and bilateral ligamentum flavum hypertrophy.  Prominent epidural fat.  Findings contribute to complete effacement of the thecal sac and moderate left and mild right neural foraminal narrowing.     L4-L5: Circumferential disc bulge causes mass effect on the ventral thecal sac and lateral recesses and encroaches into the bilateral foraminal zones.  Bilateral facet arthropathy and bilateral ligamentum flavum hypertrophy.  Prominent epidural fat.  Findings contribute to complete effacement of the thecal sac and severe left and mild right neural foraminal narrowing.     L5-S1: Posterior broad-based disc bulge.  Bilateral facet arthropathy.  Prominent epidural fat.  No spinal canal stenosis.  Mild right neural foraminal narrowing.     Impression:     1. Recent compression fracture of the L3 inferior endplate with mild height loss and moderate associated marrow edema.  No osseous retropulsion.  No epidural fluid collection.  2. Multilevel lumbar spondylosis and prominent epidural lipomatosis contributing to complete effacement of the thecal sac at L3-L4 and L4-L5 and multilevel neural foraminal narrowing most pronounced at L4-L5.  This report was flagged in Epic as abnormal.        Electronically signed by: Leeroy Bruce MD  Date:                                            03/24/2024  Time:                                           06:36    MRI THORACIC SPINE WITHOUT CONTRAST     CLINICAL HISTORY:  Mid-back pain;  Pain in thoracic spine     TECHNIQUE:  Multiplanar MR imaging of the thoracic spine was performed without the use of intravenous contrast     COMPARISON:  CT abdomen and pelvis 11/15/2022     FINDINGS:  Alignment: The thoracic spine demonstrates proper alignment.     Vertebra: Progressive height loss along the superior endplate of T11 now estimated at 60% (previously 20%).     Disc: Mild multilevel disc space narrowing.     Cord: The demonstrated portion of the  spinal cord is normal in signal intensity at all levels with no indication of myelomalacia or cord edema.     Degenerative findings: Prominent posterior epidural fat deforming the thecal sac.  Complete effacement of the thecal sac beginning at T5-6 and extending through T 7-8 as well as at T10-11.  Mild mass effect upon the thoracic spinal cord at these levels.  No focal disc abnormality.  No neural foraminal narrowing.     Focus of T2 signal hyperintensity right kidney shown to represent a cyst by prior CT.     Impression:     Progressive height loss at T11 compared to CT November 2022.  Linear STIR signal abnormality along the superior endplate suggesting recent height loss.     Prominent epidural lipomatosis resulting in complete effacement of the thecal sac from T5-6 through T7-8 and T10-11 with mild mass effect upon the thoracic spinal cord at these levels.  No cord signal abnormality identified.        Electronically signed by: Johana Reynoso  Date:                                            03/25/2024  Time:                                           08:24    XR LUMBAR SPINE AP AND LATERAL     CLINICAL HISTORY:  Vertebrogenic low back pain     FINDINGS:  LUMBAR SPINE TWO VIEWS: THERE IS A T11 COMPRESSION FRACTURE THAT MAY HAVE PROGRESSED WHEN COMPARED TO THE PRIOR STUDY.  THERE IS BASELINE DJD AND DISH.     Impression:     T11 COMPRESSION FRACTURE MAY HAVE PROGRESSED FROM THE PRIOR STUDY.        Electronically signed by: Jace Ching MD  Date:                                            03/01/2024  Time:                                           13:03    XR THORACIC SPINE AP LATERAL     CLINICAL HISTORY:  Vertebrogenic low back pain     FINDINGS:  Thoracic spine two views: There is mild DJD.  There is dish.  There is a T11 compression deformity which was seen on the CT study dated 11/15/2022.  This may have progressed when compared to the prior study dated 11/15/2022.     Impression:     T11 compression  "deformity may have progressed from the prior CT.     DJD and dish.        Electronically signed by: Jace Ching MD  Date:                                            03/01/2024  Time:                                           13:02    Pain Disability Index Review:      4/26/2024     2:15 PM 3/14/2024    12:55 PM   Last 3 PDI Scores   Pain Disability Index (PDI) 56 40       Allergies:   Review of patient's allergies indicates:   Allergen Reactions    Enbrel [etanercept] Shortness Of Breath     CHF    Nsaids (non-steroidal anti-inflammatory drug) Other (See Comments)     hypertention  Other reaction(s): Other (See Comments)  hypertention  HTN  Patient states he tolerates anti inflammatory eye drops    Statins-hmg-coa reductase inhibitors Other (See Comments)     Heart arrythemias  Other reaction(s): Other (See Comments)  Heart arrythemias  Joint pain and cardiac arrythmias    Pcn [penicillins] Rash       Current Medications:   Current Outpatient Medications   Medication Sig Dispense Refill    aspirin 81 MG Chew Take 81 mg by mouth.      BD ULTRA-FINE RAHUL PEN NEEDLE 32 gauge x 5/32" Ndle To use 6 daily 400 each 3    carvediloL (COREG) 6.25 MG tablet Take 1 tablet (6.25 mg total) by mouth 2 (two) times daily with meals. 180 tablet 3    ciprofloxacin HCl (CIPRO) 500 MG tablet Take 1 tablet (500 mg total) by mouth 2 (two) times daily. for 7 days 14 tablet 0    digoxin (LANOXIN) 125 mcg tablet TAKE 1 TABLET BY MOUTH ONCE DAILY. 90 tablet 3    diltiaZEM (CARDIZEM CD) 120 MG Cp24 Take 120 mg by mouth Daily.      ergocalciferol (ERGOCALCIFEROL) 50,000 unit Cap Take 1 capsule (50,000 Units total) by mouth every 7 days. 12 capsule 3    EScitalopram oxalate (LEXAPRO) 20 MG tablet Take 20 mg by mouth every evening.      furosemide (LASIX) 80 MG tablet Take 1 tablet (80 mg total) by mouth 2 (two) times a day. 180 tablet 3    gabapentin (NEURONTIN) 300 MG capsule 2 (two) times daily.      HYDROmorphone (DILAUDID) 4 MG tablet " TAKE 1½ Tablets by mouth EVERY 6 HOURS as needed for SEVERE PAIN 60 tablet 0    HYDROmorphone (DILAUDID) 4 MG tablet TAKE ONE TAB PO Q 6 HOURS PRN SEVERE PAIN 4 tablet 0    HYDROmorphone (DILAUDID) 4 MG tablet TAKE ONE TAB PO Q 6 HOURS PRN SEVERE PAIN 4 tablet 0    insulin aspart, niacinamide, (FIASP PENFILL U-100 INSULIN) 100 unit/mL (3 mL) Crtg pen To use with inPen; 140 max daily dose. 14 pen 11    insulin degludec (TRESIBA FLEXTOUCH U-200) 200 unit/mL (3 mL) insulin pen Inject 60 Units into the skin once daily. Cartridge for novoecho (Patient taking differently: Inject 40 Units into the skin once daily. Cartridge for novoecho) 3 pen 11    ketoconazole (NIZORAL) 2 % cream Apply to affected area DAILY 30 g 3    lamiVUDine (EPIVIR) 150 MG Tab TAKE 1 TABLET BY MOUTH EVERY DAY 30 tablet 23    metoprolol tartrate (LOPRESSOR) 50 MG tablet Take 50 mg by mouth every 12 (twelve) hours.      metroNIDAZOLE (FLAGYL) 500 MG tablet Take 1 tablet (500 mg total) by mouth every 12 (twelve) hours. for 7 days 14 tablet 0    moxifloxacin (VIGAMOX) 0.5 % ophthalmic solution Place 1 drop into the right eye 3 (three) times daily. 3 mL 3    multivitamin (THERAGRAN) per tablet Take 1 tablet by mouth once daily.      mupirocin (BACTROBAN) 2 % ointment Apply to affected area 3 times daily 22 g 1    nitroGLYCERIN (NITROSTAT) 0.4 MG SL tablet PLACE 1 TABLET UNDER THE TONGUE EVERY 5 MINUTES AS NEEDED FOR CHEST PAIN. 100 tablet 2    olmesartan (BENICAR) 40 MG tablet TAKE 1 TABLET BY MOUTH EVERY DAY AT NIGHT 90 tablet 3    oxyCODONE-acetaminophen (PERCOCET)  mg per tablet Take 1 tablet by mouth every 6 (six) hours as needed for Pain. 12 tablet 0    oxyCODONE-acetaminophen (PERCOCET) 5-325 mg per tablet Take 1 tablet by mouth every 6 (six) hours as needed for Pain. 12 tablet 0    prednisoLONE acetate (PRED FORTE) 1 % DrpS Place 1 drop into the right eye 3 (three) times daily. 10 mL 3    predniSONE (DELTASONE) 2.5 MG tablet TAKE 5 TABLETS  (12.5 MG TOTAL) BY MOUTH ONCE DAILY. 150 tablet 2    predniSONE (DELTASONE) 5 MG tablet TAKE 2 TABLETS BY MOUTH ONCE DAILY. 60 tablet 3    PROAIR HFA 90 mcg/actuation inhaler       tirzepatide 7.5 mg/0.5 mL PnIj Inject 7.5 mg into the skin every 7 days. (Patient taking differently: Inject 7.5 mg into the skin every 7 days. THURSDAYS, LAST DOSE 4/11/24) 4 Pen 11    tiZANidine (ZANAFLEX) 4 MG tablet TAKE 1 TABLET BY MOUTH EVERY 8 HOURS AS NEEDED FOR PAIN 30 tablet 2    tocilizumab 162 mg/0.9 mL PnIj Inject 162 mg into the skin once a week. 4 mL 0    tocilizumab 162 mg/0.9 mL PnIj Inject 162 mg into the skin once a week. 4 mL 6    walker Misc 1 Units by Misc.(Non-Drug; Combo Route) route daily as needed (Rollator Walker). 1 each 0    folic acid (FOLVITE) 1 MG tablet Take 1 tablet (1 mg total) by mouth once daily. 30 tablet 5    nystatin (MYCOSTATIN) powder Apply topically 4 (four) times daily. Apply to intertriginous areas as directed up to four times daily to reduce moisture. for 14 days 30 g 0     Current Facility-Administered Medications   Medication Dose Route Frequency Provider Last Rate Last Admin    sodium chloride 0.9% flush 10 mL  10 mL Intravenous PRN Hans Woodward MD           REVIEW OF SYSTEMS:  GENERAL:  current fevers or chills, recent use of antibiotics denies.  HEENT:  History of migraines/headaches: denies, History of major throat surgery: denies  RESPIRATORY:  History of home oxygen or pulmonary hypertension/severe breathing dysfunction: reports right pneumonectomy and upper lobectomy  CARDIOVASCULAR:  Hx of palpitations/rhythm problems: denies Hx of Heart Attacks/Surgery: reports prior 3 stents, history right atrial myxoma  GI:  Recent abdominal discomfort or recent change in bowel habits reports abdominal pain (also has constipation)  MUSCULOSKELETAL:  See HPI.  SKIN:  unhealed wounds or rashes: reports scrotal yeast infection and skin tears related to prednisone  PSYCH: major psychiatric history  or recent psychosocial stressors reports occasional suicidal thoughts  HEMATOLOGY/LYMPHOLOGY:  Hx of prolonged bleeding, Hx of Blood thinner usage: reports ASA 81mg ; reason: stents  NEURO:   history of seizures, strokes, chronic/old weakness (such as paralysis or paresis of any body part): reports TIA during period of myxoma (no deficits)  All other reviewed and negative other than HPI.    Past Medical History:  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    forehead    Stroke     TIA       Past Surgical History:  Past Surgical History:   Procedure Laterality Date    CATARACT EXTRACTION W/  INTRAOCULAR LENS IMPLANT Right 3/28/2024    Procedure: EXTRACTION, CATARACT, WITH IOL INSERTION;  Surgeon: Hans Woodward MD;  Location: [facility] OR;  Service: Ophthalmology;  Laterality: Right;    CORONARY ANGIOGRAPHY N/A 3/10/2021    Procedure: ANGIOGRAM, CORONARY ARTERY - right radial;  Surgeon: Shemar Dempsey MD;  Location: [facility] CATH LAB;  Service: Cardiology;  Laterality: N/A;    CORONARY STENT PLACEMENT  03/10/2021    prox-mid RCA [place] 4.5 x 26 mm, 4.5 x 12 mm    FIXATION KYPHOPLASTY N/A 4/22/2024    Procedure: KYPHOPLASTY;  Surgeon: Stefan Stern MD;  Location: [facility] OR;  Service: Pain Management;  Laterality: N/A;  NO PROPOFOL    INCISION AND DRAINAGE OF SCROTUM N/A 11/15/2022    Procedure: INCISION AND DRAINAGE, SCROTUM;  Surgeon: William Hatch MD;  Location: [facility] OR;  Service: Urology;  Laterality: N/A;    INCISION AND DRAINAGE OF SCROTUM N/A 11/17/2022    Procedure: INCISION AND DRAINAGE, SCROTUM;  Surgeon: William Hatch  MD;  Location: [facility];  Service: Urology;  Laterality: N/A;    LUNG LOBECTOMY Right 2008    RUL lobectomy after removal of atrial myxoma    PLEURA BIOPSY      RESECTION OF ATRIAL MYXOMA  2007       Family History:  Family History   Problem Relation Name Age of Onset    Hodgkin's lymphoma Mother      Diabetes type II Mother      Kidney failure Father      Diabetes type I Father      Cancer Sister         Social History:  Social History     Socioeconomic History    Marital status: Single   Occupational History    Occupation: , respiratory therapist, founded [employer]     Comment: Retired   Tobacco Use    Smoking status: Every Day     Current packs/day: 1.00     Average packs/day: 1 pack/day for 35.0 years (35.0 ttl pk-yrs)     Types: Cigarettes    Smokeless tobacco: Never   Substance and Sexual Activity    Alcohol use: Not Currently    Drug use: No    Sexual activity: Never     Social Determinants of Health     Financial Resource Strain: Low Risk  (2/20/2024)    Overall Financial Resource Strain (CARDIA)     Difficulty of Paying Living Expenses: Not very hard   Recent Concern: Financial Resource Strain - Medium Risk (11/24/2023)    Overall Financial Resource Strain (CARDIA)     Difficulty of Paying Living Expenses: Somewhat hard   Food Insecurity: No Food Insecurity (2/20/2024)    Hunger Vital Sign     Worried About Running Out of Food in the Last Year: Never true     Ran Out of Food in the Last Year: Never true   Recent Concern: Food Insecurity - Food Insecurity Present (11/24/2023)    Hunger Vital Sign     Worried About Running Out of Food in the Last Year: Sometimes true     Ran Out of Food in the Last Year: Patient declined   Transportation Needs: No Transportation Needs (2/20/2024)    PRAPARE - Transportation     Lack of Transportation (Medical): No     Lack of Transportation (Non-Medical): No   Recent Concern: Transportation Needs - Unmet Transportation Needs (11/24/2023)    PRAPARE -  Transportation     Lack of Transportation (Medical): Yes     Lack of Transportation (Non-Medical): Yes   Physical Activity: Insufficiently Active (2/20/2024)    Exercise Vital Sign     Days of Exercise per Week: 1 day     Minutes of Exercise per Session: 10 min   Stress: No Stress Concern Present (2/20/2024)    [facility] of Occupational Health - Occupational Stress Questionnaire     Feeling of Stress : Not at all   Recent Concern: Stress - Stress Concern Present (11/24/2023)    [facility] of Occupational Health - Occupational Stress Questionnaire     Feeling of Stress : To some extent   Social Connections: Unknown (11/24/2023)    Social Connection and Isolation Panel [NHANES]     Frequency of Communication with Friends and Family: More than three times a week     Frequency of Social Gatherings with Friends and Family: Once a week     Active Member of Clubs or Organizations: Yes     Attends Club or Organization Meetings: More than 4 times per year     Marital Status:    Housing Stability: Low Risk  (2/20/2024)    Housing Stability Vital Sign     Unable to Pay for Housing in the Last Year: No     Number of Places Lived in the Last Year: 1     Unstable Housing in the Last Year: No   Recent Concern: Housing Stability - High Risk (11/24/2023)    Housing Stability Vital Sign     Unable to Pay for Housing in the Last Year: Yes     Number of Places Lived in the Last Year: 1     Unstable Housing in the Last Year: No       OBJECTIVE:    /69   Pulse (!) 126   Temp 97.8 °F (36.6 °C)   Resp 18   Wt 91.3 kg (201 lb 4.5 oz)   SpO2 98%   BMI 28.07 kg/m²     PHYSICAL EXAMINATION:  General appearance: Well appearing, in no acute distress, alert and appropriately communicative.  Psych:  Mood and affect appropriate.  Skin: bruising noted to BUE.  Head/face:  Atraumatic, normocephalic.  Cor: regular rate  Pulm: Symmetric chest rise, no respiratory distress noted.   Back: Straight leg raising in the  sitting position is negative to radicular pain. There is pain with palpation over thoracic and lumbar paraspinals and spinous process. Exam limited due to pain.  Extremities: No deformities. No significant open wounds. No major amputations.  Musculoskeletal:  Bilateral lower extremity strength is 4/5.  No atrophy or tone abnormalities are noted.  Neuro: No loss of sensation noted.   Gait: wheelchair.     CMP  Sodium   Date Value Ref Range Status   04/25/2024 139 136 - 145 mmol/L Final     Potassium   Date Value Ref Range Status   04/25/2024 3.9 3.5 - 5.1 mmol/L Final     Chloride   Date Value Ref Range Status   04/25/2024 104 95 - 110 mmol/L Final     CO2   Date Value Ref Range Status   04/25/2024 22 (L) 23 - 29 mmol/L Final     Carbon Monoxide, Blood   Date Value Ref Range Status   11/04/2021 11 (H) % Final     Comment:     -------------------REFERENCE VALUE--------------------------  0-2 Normal (Non-smoker) , < or = 9 Normal (Smoker), > or   = 20 (Toxic concentration)    Test Performed by:  [facility]  [address] [place] [address]  : Demetrius Saldana M.D. Ph.D.; CLIA# [id number]       Glucose   Date Value Ref Range Status   04/25/2024 186 (H) 70 - 110 mg/dL Final     BUN   Date Value Ref Range Status   04/25/2024 11 8 - 23 mg/dL Final     Creatinine   Date Value Ref Range Status   04/25/2024 0.8 0.5 - 1.4 mg/dL Final     Calcium   Date Value Ref Range Status   04/25/2024 9.4 8.7 - 10.5 mg/dL Final     Total Protein   Date Value Ref Range Status   04/24/2024 6.3 6.0 - 8.4 g/dL Final     Albumin   Date Value Ref Range Status   04/24/2024 2.8 (L) 3.5 - 5.2 g/dL Final   06/21/2022 4.1 3.6 - 5.1 g/dL Final     Total Bilirubin   Date Value Ref Range Status   04/24/2024 0.6 0.1 - 1.0 mg/dL Final     Comment:     For infants and newborns, interpretation of results should be based  on gestational age, weight and in agreement with  clinical  observations.    Premature Infant recommended reference ranges:  Up to 24 hours.............<8.0 mg/dL  Up to 48 hours............<12.0 mg/dL  3-5 days..................<15.0 mg/dL  6-29 days.................<15.0 mg/dL       Alkaline Phosphatase   Date Value Ref Range Status   04/24/2024 128 55 - 135 U/L Final     AST   Date Value Ref Range Status   04/24/2024 12 10 - 40 U/L Final     ALT   Date Value Ref Range Status   04/24/2024 11 10 - 44 U/L Final     Anion Gap   Date Value Ref Range Status   04/25/2024 13 8 - 16 mmol/L Final     eGFR   Date Value Ref Range Status   04/25/2024 >60 >60 mL/min/1.73 m^2 Final     ASSESSMENT: 68 y.o. year old male with mid back and lower back pain, consistent with:     1. Compression fracture of L1 vertebra, initial encounter        2. Closed compression fracture of thoracolumbar vertebra with routine healing, subsequent encounter              IMPRESSION: Nithin Wagner presents today for mid back and lower back pain.     PLAN:     - He is s/p T11 and L3 kyphoplasty.     - Recent CT reviewed, new compression fracture at L1.     - Given severe intractable pain, recommend ER for possible admission. Discussed with Dr. Stern who was in agreement. Spoke with CARLOS Jones in the ER who recommended [facility] for Spine Surgery services.     The above plan and management options were discussed at length with patient. Patient is in agreement with the above and verbalized understanding.    Claudia Phillips  04/26/2024

## 2024-04-26 NOTE — ED NOTES
I-STAT Chem-8+ Results:   Value Reference Range   Sodium 138 136-145 mmol/L   Potassium  3.8 3.5-5.1 mmol/L   Chloride 103  mmol/L   Ionized Calcium 1.21 1.06-1.42 mmol/L   CO2 (measured) 19 23-29 mmol/L   Glucose 153  mg/dL   BUN 9 6-30 mg/dL   Creatinine 0.7 0.5-1.4 mg/dL   Hematocrit 52 36-54%

## 2024-04-26 NOTE — ED PROVIDER NOTES
"Encounter Date: 4/26/2024       History     Chief Complaint   Patient presents with    Multiple Complaints     Patient presents for severe back pain, N/V/D, and 20 lb weight loss within the last week. Reports he was previously at [facility] and instructed to come to [facility] ER so "I can get a room".     68-year-old male with a past medical history of osteoporosis and vertebral compression fractures s/p T11 and L3 kyphoplasty on 4/22/2024 at [facility]. He was seen by his pain specialist today and was found to have a new compression deformity involving the superior endplate of L1 on CT since his previous MRI, and was referred to the ER. The patient reports 9/10 mid to low back pain. He denies any falls, weakness, change in sensation, difficulty urinating. He is concerned about a 10 day history of diarrhea, which he describes as watery brown, difficult to control, and is accompanied by RLQ pain which he describes as sharp, constant, and a 5/10 in severity. He denies fevers, recent antibiotic use, palpitations, chest pain, SOB.    The history is provided by the patient.     Review of patient's allergies indicates:   Allergen Reactions    Enbrel [etanercept] Shortness Of Breath     CHF    Nsaids (non-steroidal anti-inflammatory drug) Other (See Comments)     hypertention  Other reaction(s): Other (See Comments)  hypertention  HTN  Patient states he tolerates anti inflammatory eye drops    Statins-hmg-coa reductase inhibitors Other (See Comments)     Heart arrythemias  Other reaction(s): Other (See Comments)  Heart arrythemias  Joint pain and cardiac arrythmias    Pcn [penicillins] Rash     Past Medical History:   Diagnosis Date    Arthritis     Atrial myxoma     Coronary atherosclerosis 2020    stents x 3    Diabetes mellitus, type 2     Difficult intubation     Heart failure     Hepatitis B     HPV (human papilloma virus) infection     Hyperlipidemia     Hypertension     Non-alcoholic fatty liver disease     Rheumatoid " arthritis     Rheumatoid arthritis flare 07/12/2021    Squamous cell carcinoma of forehead 10/26/2023    forehead    Stroke     TIA     Past Surgical History:   Procedure Laterality Date    CATARACT EXTRACTION W/  INTRAOCULAR LENS IMPLANT Right 3/28/2024    Procedure: EXTRACTION, CATARACT, WITH IOL INSERTION;  Surgeon: Hans Woodward MD;  Location: [facility] OR;  Service: Ophthalmology;  Laterality: Right;    CORONARY ANGIOGRAPHY N/A 3/10/2021    Procedure: ANGIOGRAM, CORONARY ARTERY - right radial;  Surgeon: Shemar Dempsey MD;  Location: [facility] CATH LAB;  Service: Cardiology;  Laterality: N/A;    CORONARY STENT PLACEMENT  03/10/2021    prox-mid RCA [place] 4.5 x 26 mm, 4.5 x 12 mm    FIXATION KYPHOPLASTY N/A 4/22/2024    Procedure: KYPHOPLASTY;  Surgeon: Stefan Stern MD;  Location: [facility] OR;  Service: Pain Management;  Laterality: N/A;  NO PROPOFOL    INCISION AND DRAINAGE OF SCROTUM N/A 11/15/2022    Procedure: INCISION AND DRAINAGE, SCROTUM;  Surgeon: William Hatch MD;  Location: [facility] OR;  Service: Urology;  Laterality: N/A;    INCISION AND DRAINAGE OF SCROTUM N/A 11/17/2022    Procedure: INCISION AND DRAINAGE, SCROTUM;  Surgeon: William Hatch MD;  Location: [facility];  Service: Urology;  Laterality: N/A;    LUNG LOBECTOMY Right 2008    RUL lobectomy after removal of atrial myxoma    PLEURA BIOPSY      RESECTION OF ATRIAL MYXOMA  2007     Family History   Problem Relation Name Age of Onset    Hodgkin's lymphoma Mother      Diabetes type II Mother      Kidney failure Father      Diabetes type I Father      Cancer Sister       Social History     Tobacco Use    Smoking status: Every Day     Current packs/day: 1.00     Average packs/day: 1 pack/day for 35.0 years (35.0 ttl pk-yrs)     Types: Cigarettes    Smokeless tobacco: Never   Substance Use Topics    Alcohol use: Not Currently    Drug use: No     Review of Systems  See HPI    Physical Exam     Initial Vitals [04/26/24 1525]   BP Pulse Resp Temp SpO2   116/66 (!)  116 19 98.6 °F (37 °C) 95 %      MAP       --         Physical Exam    Nursing note and vitals reviewed.      Gen: AxOx3, well nourished, appears stated age, no pallor, no jaundice, appears well hydrated  Eye: EOMI, no scleral icterus, no periorbital edema or ecchymosis  Head: Normocephalic, atraumatic, no lesions, scalp appears normal  ENT: Neck supple, no stridor, no masses, no drooling or voice changes  CVS: All distal pulses intact with normal rate and rhythm, no JVD, normal S1/S2, no murmur  Pulm: Normal breath sounds, no wheezes, rales or rhonchi, no increased work of breathing  Abd:  Nondistended, soft, nontender, no organomegaly, no CVAT  Ext: No edema, no lesions, rashes, or deformity  Well-healing surgical scars on back over midline which do not appear erythematous or infected  Neuro:   GCS15  RLE  - power/tone/sensation intact   LLE  - power/tone/sensation intact   Psych: normal affect, cooperative, well groomed, makes good eye contact      ED Course   Procedures  Labs Reviewed   CBC W/ AUTO DIFFERENTIAL - Abnormal; Notable for the following components:       Result Value     (*)     MCH 33.8 (*)     Immature Granulocytes 0.7 (*)     Immature Grans (Abs) 0.06 (*)     Lymph % 17.3 (*)     All other components within normal limits   COMPREHENSIVE METABOLIC PANEL - Abnormal; Notable for the following components:    CO2 16 (*)     Glucose 148 (*)     Albumin 2.8 (*)     Alkaline Phosphatase 138 (*)     All other components within normal limits   B-TYPE NATRIURETIC PEPTIDE - Abnormal; Notable for the following components:     (*)     All other components within normal limits   SEDIMENTATION RATE - Abnormal; Notable for the following components:    Sed Rate 41 (*)     All other components within normal limits   C-REACTIVE PROTEIN - Abnormal; Notable for the following components:    CRP 29.4 (*)     All other components within normal limits   ISTAT PROCEDURE - Abnormal; Notable for the following  components:    POC Glucose 153 (*)     POC TCO2 (MEASURED) 19 (*)     All other components within normal limits   TROPONIN I   TSH   MAGNESIUM   PHOSPHORUS   ISTAT LACTATE   ISTAT CHEM8     EKG Readings: (Independently Interpreted)   Initial Reading: No STEMI.   Sinus tachycardia with rate 160.  No ST depressions or elevations        Imaging Results              MRI Lumbar Spine Without Contrast (Final result)  Result time 04/27/24 05:18:22      Final result by Jj Ortez MD (04/27/24 05:18:22)                   Impression:      Significantly limited exam due to early termination by patient.  Limited sequences degraded by motion.    Acute compression fracture T1 with mild height loss and 7 mm osseous retropulsion resulting in moderate spinal canal stenosis.    Postprocedural change vertebral augmentation T11 and L3.    Additional degenerative changes, similar to 03/23/2024.    Electronically signed by resident: Teofilo Beltre  Date:    04/27/2024  Time:    04:59    Electronically signed by: Jj Ortez MD  Date:    04/27/2024  Time:    05:18               Narrative:    EXAMINATION:  MRI LUMBAR SPINE WITHOUT CONTRAST    CLINICAL HISTORY:  Low back pain, prior surgery, new symptoms;Post kyphoplasty with new fracture L1 on CT;    TECHNIQUE:  Multiplanar, multisequence MR images were acquired from the thoracolumbar junction to the sacrum without the administration of contrast.    COMPARISON:  CT abdomen pelvis 04/25/2024    MRI thoracic and lumbar spine 03/23/2024    FINDINGS:  Significantly limited exam terminated early by patient with motion artifact on the sequences performed.  Only 3 motion degraded sequences were performed.    Alignment: Grade 1 retrolisthesis L3 on L4.    Vertebrae: Postprocedural change of vertebral augmentation performed at T11 and L3 with stable moderate height loss T11 and mild height loss L3.  Acute compression fracture with edema involving the superior endplate L1 with mild height  loss and 7 mm osseous retropulsion.  No definite extension into the posterior elements.  Two no infiltrative marrow process.    Discs: Multilevel disc height loss and desiccation.    Spinal canal: Visualized cord with normal morphology and signal.  Prominent epidural fat at multiple levels most pronounced at L3-L4 and L4-L5.    Soft tissue structures are unremarkable.  Limited evaluation of the retroperitoneal organs demonstrates no significant abnormalities.  Paraspinal musculature demonstrates mild atrophy.    Degenerative findings: Osseous retropulsion results in moderate spinal canal stenosis at T12-L1.  Otherwise no significant change as compared to recent MR.                                       X-Ray Chest AP Portable (Final result)  Result time 04/26/24 17:59:00      Final result by Deandre Daniels MD (04/26/24 17:59:00)                   Impression:      1. Chronic appearing interstitial findings, interstitial edema is a consideration.  No large focal consolidation.      Electronically signed by: Deandre Daniels MD  Date:    04/26/2024  Time:    17:59               Narrative:    EXAMINATION:  XR CHEST AP PORTABLE    CLINICAL HISTORY:  tachycardia;    TECHNIQUE:  Single frontal view of the chest was performed.    COMPARISON:  02/09/2024    FINDINGS:  The cardiomediastinal silhouette is not enlarged noting calcification of the aorta..  There is obscuration of the right costophrenic angle, may reflect pleural effusion versus chronic pleural thickening..  The trachea is midline.  The lungs are symmetrically expanded bilaterally with coarse interstitial attenuation bilaterally, similar to the previous exam..  No new large focal consolidation.  There is no pneumothorax.  The osseous structures are remarkable for degenerative change..                                       Medications   digoxin tablet 0.125 mg (0.125 mg Oral Given 4/27/24 0929)   EScitalopram oxalate tablet 20 mg (20 mg Oral Given 4/27/24  0217)   gabapentin capsule 300 mg (300 mg Oral Given 4/27/24 0930)   oxyCODONE immediate release tablet Tab 10 mg (10 mg Oral Given 4/27/24 1550)   sodium chloride 0.9% flush 10 mL (has no administration in time range)   naloxone 0.4 mg/mL injection 0.02 mg (has no administration in time range)   glucose chewable tablet 16 g (has no administration in time range)   glucose chewable tablet 24 g (has no administration in time range)   glucagon (human recombinant) injection 1 mg (has no administration in time range)   enoxaparin injection 40 mg (has no administration in time range)   insulin aspart U-100 pen 0-5 Units (has no administration in time range)   ondansetron injection 4 mg (has no administration in time range)   dextrose 10% bolus 125 mL 125 mL (has no administration in time range)   dextrose 10% bolus 250 mL 250 mL (has no administration in time range)   loperamide capsule 2 mg (2 mg Oral Given 4/27/24 1308)   Lactobacillus rhamnosus GG capsule 1 capsule (1 capsule Oral Given 4/27/24 1308)   HYDROmorphone injection 1 mg (has no administration in time range)   lactated ringers bolus 1,000 mL (0 mLs Intravenous Stopped 4/26/24 1829)   HYDROmorphone injection 1 mg (1 mg Intravenous Given 4/26/24 1724)   HYDROmorphone injection 1 mg (1 mg Intravenous Given 4/26/24 2033)   magnesium sulfate 2g in water 50mL IVPB (premix) (0 g Intravenous Stopped 4/27/24 0101)   potassium chloride SA CR tablet 20 mEq (20 mEq Oral Given 4/26/24 2300)     Medical Decision Making  Initial assessment  68-year-old male day 3 post kyphoplasty now presenting with back pain. Patient is able to vocalise, breathing spontaneously, hemodynamically stable but tachycardic up to 160, oriented, moving all 4 limbs spontaneously.  Examination consistent with tenderness to palpation over spine.      Differential diagnosis  Back pain due to fracture  Surgical site infection  Dysrhythmia  ACS  Electrolyte/metabolic derangements  Dehydration      ED  management  Patient was not significant pain and Dilaudid was given.  Examination was reassuring for no neurological fall out.  Patient was discussed with Neuro surgery who was covering for spine today who recommended obtaining upright x-rays, T SLR brace and admitting to Hospital Medicine.  Workup including CBC and CMP were only consistent with mild hypoalbuminemia but otherwise unremarkable.  CRP 29 and sed rate 41.  After treating patient's pain and giving IV fluids patient's heart rate improved from 162 to 103 and I suspect patient's tachycardia was due to pain given EKG consistent with sinus tachycardia.  Patient was admitted to Hospital Medicine with plan for neurosurgery spine evaluation and MRI while inpatient.  Neurosurgery will follow-up with results of upright x-ray.      Amount and/or Complexity of Data Reviewed  Labs: ordered. Decision-making details documented in ED Course.  Radiology: ordered.    Risk  Prescription drug management.              Attending Attestation:   Physician Attestation Statement for Resident:  As the supervising MD   Physician Attestation Statement: I have personally seen and examined this patient.   I agree with the above history.  -:   As the supervising MD I agree with the above PE.     As the supervising MD I agree with the above treatment, course, plan, and disposition.                    I have reviewed and concur with the resident's history, physical, assessment, and plan.  I have personally interviewed and examined the patient at bedside.   I did supervise any and all procedures and was present for any critical portion, and was always immediately available for help and as a resource.     The above history physical, review of symptoms, HPI and physical exam reflect my independent interpretation and evaluation.    Complexity: High Risk    Final diagnoses:  [S32.009A] Lumbar vertebral fracture  [R00.0] Tachycardia  [R52] Breakthrough pain     Nolan Gambino DO,  FAAEM  Emergency Staff Physician   Dept of Emergency Medicine   [facility]  Spectralink: [id number]        Disclaimer: This note has been generated using voice-recognition software. There may be typographical errors that have been missed during proof-reading.            ED Course as of 04/27/24 1641 Fri Apr 26, 2024   1650 BP: 116/66 [PM]   1651 Pulse(!): 116 [PM]   1838 CBC auto differential(!)  Grossly benign [PM]   1838 Comprehensive metabolic panel(!)  Grossly benign outside of mild hypoalbuminemia [PM]   1838 CRP(!): 29.4 [PM]   1839 Sed Rate(!): 41 [PM]   1839 BNP(!): 157 [PM]   1839 Troponin I: <0.006 [PM]   1839 Magnesium : 1.7 [PM]   1839 TSH: 1.819 [PM]   1839 POC Lactate: 1.06 [PM]      ED Course User Index  [PM] Cam Bowen MD                           Clinical Impression:  Final diagnoses:  [S32.009A] Lumbar vertebral fracture  [R00.0] Tachycardia  [R52] Breakthrough pain          ED Disposition Condition    Observation Stable                Cam Bowen MD  Resident  04/26/24 2332       Nolan Gambino DO  04/27/24 1641

## 2024-04-27 LAB
ALBUMIN SERPL BCP-MCNC: 2.6 G/DL (ref 3.5–5.2)
ALP SERPL-CCNC: 131 U/L (ref 55–135)
ALT SERPL W/O P-5'-P-CCNC: 10 U/L (ref 10–44)
ANION GAP SERPL CALC-SCNC: 15 MMOL/L (ref 8–16)
AST SERPL-CCNC: 12 U/L (ref 10–40)
BACTERIA #/AREA URNS AUTO: ABNORMAL /HPF
BASOPHILS # BLD AUTO: 0.06 K/UL (ref 0–0.2)
BASOPHILS NFR BLD: 0.6 % (ref 0–1.9)
BILIRUB SERPL-MCNC: 0.4 MG/DL (ref 0.1–1)
BILIRUB UR QL STRIP: NEGATIVE
BUN SERPL-MCNC: 8 MG/DL (ref 8–23)
C DIFF GDH STL QL: NEGATIVE
C DIFF TOX A+B STL QL IA: NEGATIVE
CALCIUM SERPL-MCNC: 8.9 MG/DL (ref 8.7–10.5)
CHLORIDE SERPL-SCNC: 107 MMOL/L (ref 95–110)
CLARITY UR REFRACT.AUTO: CLEAR
CO2 SERPL-SCNC: 16 MMOL/L (ref 23–29)
COLOR UR AUTO: YELLOW
CREAT SERPL-MCNC: 0.8 MG/DL (ref 0.5–1.4)
DIFFERENTIAL METHOD BLD: ABNORMAL
EOSINOPHIL # BLD AUTO: 0.1 K/UL (ref 0–0.5)
EOSINOPHIL NFR BLD: 1.4 % (ref 0–8)
ERYTHROCYTE [DISTWIDTH] IN BLOOD BY AUTOMATED COUNT: 12.1 % (ref 11.5–14.5)
EST. GFR  (NO RACE VARIABLE): >60 ML/MIN/1.73 M^2
GLUCOSE SERPL-MCNC: 143 MG/DL (ref 70–110)
GLUCOSE UR QL STRIP: NEGATIVE
HCT VFR BLD AUTO: 46.1 % (ref 40–54)
HGB BLD-MCNC: 15.2 G/DL (ref 14–18)
HGB UR QL STRIP: NEGATIVE
HYALINE CASTS UR QL AUTO: 3 /LPF
IMM GRANULOCYTES # BLD AUTO: 0.04 K/UL (ref 0–0.04)
IMM GRANULOCYTES NFR BLD AUTO: 0.4 % (ref 0–0.5)
KETONES UR QL STRIP: ABNORMAL
LEUKOCYTE ESTERASE UR QL STRIP: NEGATIVE
LYMPHOCYTES # BLD AUTO: 1.5 K/UL (ref 1–4.8)
LYMPHOCYTES NFR BLD: 15.6 % (ref 18–48)
MAGNESIUM SERPL-MCNC: 1.9 MG/DL (ref 1.6–2.6)
MCH RBC QN AUTO: 32.9 PG (ref 27–31)
MCHC RBC AUTO-ENTMCNC: 33 G/DL (ref 32–36)
MCV RBC AUTO: 100 FL (ref 82–98)
MICROSCOPIC COMMENT: ABNORMAL
MONOCYTES # BLD AUTO: 1 K/UL (ref 0.3–1)
MONOCYTES NFR BLD: 10.7 % (ref 4–15)
NEUTROPHILS # BLD AUTO: 6.7 K/UL (ref 1.8–7.7)
NEUTROPHILS NFR BLD: 71.3 % (ref 38–73)
NITRITE UR QL STRIP: NEGATIVE
NRBC BLD-RTO: 0 /100 WBC
OHS QRS DURATION: 92 MS
OHS QTC CALCULATION: 510 MS
PH UR STRIP: 6 [PH] (ref 5–8)
PLATELET # BLD AUTO: 262 K/UL (ref 150–450)
PMV BLD AUTO: 11.9 FL (ref 9.2–12.9)
POCT GLUCOSE: 129 MG/DL (ref 70–110)
POCT GLUCOSE: 143 MG/DL (ref 70–110)
POCT GLUCOSE: 144 MG/DL (ref 70–110)
POCT GLUCOSE: 144 MG/DL (ref 70–110)
POTASSIUM SERPL-SCNC: 3.8 MMOL/L (ref 3.5–5.1)
PROT SERPL-MCNC: 5.8 G/DL (ref 6–8.4)
PROT UR QL STRIP: ABNORMAL
RBC # BLD AUTO: 4.62 M/UL (ref 4.6–6.2)
RBC #/AREA URNS AUTO: 0 /HPF (ref 0–4)
SODIUM SERPL-SCNC: 138 MMOL/L (ref 136–145)
SP GR UR STRIP: 1.02 (ref 1–1.03)
URN SPEC COLLECT METH UR: ABNORMAL
WBC # BLD AUTO: 9.45 K/UL (ref 3.9–12.7)
WBC #/AREA URNS AUTO: 0 /HPF (ref 0–5)

## 2024-04-27 PROCEDURE — 81001 URINALYSIS AUTO W/SCOPE: CPT | Performed by: INTERNAL MEDICINE

## 2024-04-27 PROCEDURE — 85025 COMPLETE CBC W/AUTO DIFF WBC: CPT | Performed by: INTERNAL MEDICINE

## 2024-04-27 PROCEDURE — 63600175 PHARM REV CODE 636 W HCPCS: Performed by: INTERNAL MEDICINE

## 2024-04-27 PROCEDURE — G0378 HOSPITAL OBSERVATION PER HR: HCPCS

## 2024-04-27 PROCEDURE — 80053 COMPREHEN METABOLIC PANEL: CPT | Performed by: INTERNAL MEDICINE

## 2024-04-27 PROCEDURE — 63600175 PHARM REV CODE 636 W HCPCS: Performed by: HOSPITALIST

## 2024-04-27 PROCEDURE — 83735 ASSAY OF MAGNESIUM: CPT | Performed by: INTERNAL MEDICINE

## 2024-04-27 PROCEDURE — 99223 1ST HOSP IP/OBS HIGH 75: CPT | Mod: ,,, | Performed by: NEUROLOGICAL SURGERY

## 2024-04-27 PROCEDURE — 36415 COLL VENOUS BLD VENIPUNCTURE: CPT | Performed by: INTERNAL MEDICINE

## 2024-04-27 PROCEDURE — 25000003 PHARM REV CODE 250: Performed by: INTERNAL MEDICINE

## 2024-04-27 PROCEDURE — 25000003 PHARM REV CODE 250: Performed by: HOSPITALIST

## 2024-04-27 PROCEDURE — 96372 THER/PROPH/DIAG INJ SC/IM: CPT | Performed by: INTERNAL MEDICINE

## 2024-04-27 RX ORDER — LOPERAMIDE HYDROCHLORIDE 2 MG/1
2 CAPSULE ORAL 4 TIMES DAILY PRN
Status: DISCONTINUED | OUTPATIENT
Start: 2024-04-27 | End: 2024-05-01

## 2024-04-27 RX ORDER — GABAPENTIN 300 MG/1
300 CAPSULE ORAL 2 TIMES DAILY
Status: DISCONTINUED | OUTPATIENT
Start: 2024-04-27 | End: 2024-05-01

## 2024-04-27 RX ORDER — ESCITALOPRAM OXALATE 20 MG/1
20 TABLET ORAL NIGHTLY
Status: DISCONTINUED | OUTPATIENT
Start: 2024-04-27 | End: 2024-05-08 | Stop reason: HOSPADM

## 2024-04-27 RX ORDER — HYDROMORPHONE HYDROCHLORIDE 1 MG/ML
1 INJECTION, SOLUTION INTRAMUSCULAR; INTRAVENOUS; SUBCUTANEOUS
Status: DISCONTINUED | OUTPATIENT
Start: 2024-04-27 | End: 2024-05-01

## 2024-04-27 RX ORDER — GLUCAGON 1 MG
1 KIT INJECTION
Status: DISCONTINUED | OUTPATIENT
Start: 2024-04-27 | End: 2024-05-08 | Stop reason: HOSPADM

## 2024-04-27 RX ORDER — SODIUM CHLORIDE 0.9 % (FLUSH) 0.9 %
10 SYRINGE (ML) INJECTION EVERY 12 HOURS PRN
Status: DISCONTINUED | OUTPATIENT
Start: 2024-04-27 | End: 2024-05-08 | Stop reason: HOSPADM

## 2024-04-27 RX ORDER — INSULIN ASPART 100 [IU]/ML
0-5 INJECTION, SOLUTION INTRAVENOUS; SUBCUTANEOUS
Status: DISCONTINUED | OUTPATIENT
Start: 2024-04-27 | End: 2024-05-08 | Stop reason: HOSPADM

## 2024-04-27 RX ORDER — DIGOXIN 125 MCG
0.12 TABLET ORAL DAILY
Status: DISCONTINUED | OUTPATIENT
Start: 2024-04-27 | End: 2024-05-08 | Stop reason: HOSPADM

## 2024-04-27 RX ORDER — ENOXAPARIN SODIUM 100 MG/ML
40 INJECTION SUBCUTANEOUS EVERY 24 HOURS
Status: DISCONTINUED | OUTPATIENT
Start: 2024-04-27 | End: 2024-05-08 | Stop reason: HOSPADM

## 2024-04-27 RX ORDER — ONDANSETRON HYDROCHLORIDE 2 MG/ML
4 INJECTION, SOLUTION INTRAVENOUS EVERY 8 HOURS PRN
Status: DISCONTINUED | OUTPATIENT
Start: 2024-04-27 | End: 2024-05-08 | Stop reason: HOSPADM

## 2024-04-27 RX ORDER — IBUPROFEN 200 MG
24 TABLET ORAL
Status: DISCONTINUED | OUTPATIENT
Start: 2024-04-27 | End: 2024-05-08 | Stop reason: HOSPADM

## 2024-04-27 RX ORDER — NALOXONE HCL 0.4 MG/ML
0.02 VIAL (ML) INJECTION
Status: DISCONTINUED | OUTPATIENT
Start: 2024-04-27 | End: 2024-05-08 | Stop reason: HOSPADM

## 2024-04-27 RX ORDER — IBUPROFEN 200 MG
16 TABLET ORAL
Status: DISCONTINUED | OUTPATIENT
Start: 2024-04-27 | End: 2024-05-08 | Stop reason: HOSPADM

## 2024-04-27 RX ADMIN — HYDROMORPHONE HYDROCHLORIDE 1 MG: 1 INJECTION, SOLUTION INTRAMUSCULAR; INTRAVENOUS; SUBCUTANEOUS at 09:04

## 2024-04-27 RX ADMIN — ESCITALOPRAM OXALATE 20 MG: 20 TABLET, FILM COATED ORAL at 08:04

## 2024-04-27 RX ADMIN — OXYCODONE HYDROCHLORIDE 10 MG: 10 TABLET ORAL at 03:04

## 2024-04-27 RX ADMIN — OXYCODONE HYDROCHLORIDE 10 MG: 10 TABLET ORAL at 06:04

## 2024-04-27 RX ADMIN — GABAPENTIN 300 MG: 300 CAPSULE ORAL at 08:04

## 2024-04-27 RX ADMIN — DIGOXIN 0.12 MG: 125 TABLET ORAL at 09:04

## 2024-04-27 RX ADMIN — Medication 1 CAPSULE: at 01:04

## 2024-04-27 RX ADMIN — OXYCODONE HYDROCHLORIDE 10 MG: 10 TABLET ORAL at 08:04

## 2024-04-27 RX ADMIN — HYDROMORPHONE HYDROCHLORIDE 1 MG: 1 INJECTION, SOLUTION INTRAMUSCULAR; INTRAVENOUS; SUBCUTANEOUS at 04:04

## 2024-04-27 RX ADMIN — GABAPENTIN 300 MG: 300 CAPSULE ORAL at 09:04

## 2024-04-27 RX ADMIN — HYDROMORPHONE HYDROCHLORIDE 1 MG: 1 INJECTION, SOLUTION INTRAMUSCULAR; INTRAVENOUS; SUBCUTANEOUS at 01:04

## 2024-04-27 RX ADMIN — ENOXAPARIN SODIUM 40 MG: 40 INJECTION SUBCUTANEOUS at 04:04

## 2024-04-27 RX ADMIN — OXYCODONE HYDROCHLORIDE 10 MG: 10 TABLET ORAL at 11:04

## 2024-04-27 RX ADMIN — HYDROMORPHONE HYDROCHLORIDE 1 MG: 1 INJECTION, SOLUTION INTRAMUSCULAR; INTRAVENOUS; SUBCUTANEOUS at 10:04

## 2024-04-27 RX ADMIN — ESCITALOPRAM OXALATE 20 MG: 20 TABLET, FILM COATED ORAL at 02:04

## 2024-04-27 RX ADMIN — LOPERAMIDE HYDROCHLORIDE 2 MG: 2 CAPSULE ORAL at 01:04

## 2024-04-27 NOTE — SUBJECTIVE & OBJECTIVE
"Facility-Administered Medications Prior to Admission   Medication Dose Route Frequency Provider Last Rate Last Admin    sodium chloride 0.9% flush 10 mL  10 mL Intravenous PRN Hans Woodward MD         Medications Prior to Admission   Medication Sig Dispense Refill Last Dose    aspirin 81 MG Chew Take 81 mg by mouth.       BD ULTRA-FINE RAHUL PEN NEEDLE 32 gauge x 5/32" Ndle To use 6 daily 400 each 3     carvediloL (COREG) 6.25 MG tablet Take 1 tablet (6.25 mg total) by mouth 2 (two) times daily with meals. 180 tablet 3     ciprofloxacin HCl (CIPRO) 500 MG tablet Take 1 tablet (500 mg total) by mouth 2 (two) times daily. for 7 days 14 tablet 0     digoxin (LANOXIN) 125 mcg tablet TAKE 1 TABLET BY MOUTH ONCE DAILY. 90 tablet 3     diltiaZEM (CARDIZEM CD) 120 MG Cp24 Take 120 mg by mouth Daily.       ergocalciferol (ERGOCALCIFEROL) 50,000 unit Cap Take 1 capsule (50,000 Units total) by mouth every 7 days. 12 capsule 3     EScitalopram oxalate (LEXAPRO) 20 MG tablet Take 20 mg by mouth every evening.       folic acid (FOLVITE) 1 MG tablet Take 1 tablet (1 mg total) by mouth once daily. 30 tablet 5     furosemide (LASIX) 80 MG tablet Take 1 tablet (80 mg total) by mouth 2 (two) times a day. 180 tablet 3     gabapentin (NEURONTIN) 300 MG capsule 2 (two) times daily.       HYDROmorphone (DILAUDID) 4 MG tablet TAKE 1½ Tablets by mouth EVERY 6 HOURS as needed for SEVERE PAIN 60 tablet 0     HYDROmorphone (DILAUDID) 4 MG tablet TAKE ONE TABLET BY MOUTH EVERY 6 HOURS AS NEEDED FOR SEVERE PAIN 4 tablet 0     HYDROmorphone (DILAUDID) 4 MG tablet TAKE ONE TAB PO Q 6 HOURS PRN SEVERE PAIN 4 tablet 0     HYDROmorphone (DILAUDID) 4 MG tablet TAKE ONE TABLET BY MOUTH EVERY 6 HOURS AS NEEDED FOR SEVERE PAIN 12 tablet 0     insulin aspart, niacinamide, (FIASP PENFILL U-100 INSULIN) 100 unit/mL (3 mL) Crtg pen To use with inPen; 140 max daily dose. 14 pen 11     insulin degludec (TRESIBA FLEXTOUCH U-200) 200 unit/mL (3 mL) insulin pen " Inject 60 Units into the skin once daily. Cartridge for novoecho (Patient taking differently: Inject 40 Units into the skin once daily. Cartridge for novoecho) 3 pen 11     ketoconazole (NIZORAL) 2 % cream Apply to affected area DAILY 30 g 3     lamiVUDine (EPIVIR) 150 MG Tab TAKE 1 TABLET BY MOUTH EVERY DAY 30 tablet 23     metoprolol tartrate (LOPRESSOR) 50 MG tablet Take 50 mg by mouth every 12 (twelve) hours.       metroNIDAZOLE (FLAGYL) 500 MG tablet Take 1 tablet (500 mg total) by mouth every 12 (twelve) hours. for 7 days 14 tablet 0     moxifloxacin (VIGAMOX) 0.5 % ophthalmic solution Place 1 drop into the right eye 3 (three) times daily. 3 mL 3     multivitamin (THERAGRAN) per tablet Take 1 tablet by mouth once daily.       mupirocin (BACTROBAN) 2 % ointment Apply to affected area 3 times daily 22 g 1     nitroGLYCERIN (NITROSTAT) 0.4 MG SL tablet PLACE 1 TABLET UNDER THE TONGUE EVERY 5 MINUTES AS NEEDED FOR CHEST PAIN. 100 tablet 2     nystatin (MYCOSTATIN) powder Apply topically 4 (four) times daily. Apply to intertriginous areas as directed up to four times daily to reduce moisture. for 14 days 30 g 0     olmesartan (BENICAR) 40 MG tablet TAKE 1 TABLET BY MOUTH EVERY DAY AT NIGHT 90 tablet 3     oxyCODONE-acetaminophen (PERCOCET)  mg per tablet Take 1 tablet by mouth every 6 (six) hours as needed for Pain. 12 tablet 0     oxyCODONE-acetaminophen (PERCOCET) 5-325 mg per tablet Take 1 tablet by mouth every 6 (six) hours as needed for Pain. 12 tablet 0     prednisoLONE acetate (PRED FORTE) 1 % DrpS Place 1 drop into the right eye 3 (three) times daily. 10 mL 3     predniSONE (DELTASONE) 2.5 MG tablet TAKE 5 TABLETS (12.5 MG TOTAL) BY MOUTH ONCE DAILY. 150 tablet 2     predniSONE (DELTASONE) 5 MG tablet TAKE 2 TABLETS BY MOUTH ONCE DAILY. 60 tablet 3     PROAIR HFA 90 mcg/actuation inhaler        tirzepatide 7.5 mg/0.5 mL PnIj Inject 7.5 mg into the skin every 7 days. (Patient taking differently: Inject  7.5 mg into the skin every 7 days. THURSDAYS, LAST DOSE 4/11/24) 4 Pen 11     tiZANidine (ZANAFLEX) 4 MG tablet TAKE 1 TABLET BY MOUTH EVERY 8 HOURS AS NEEDED FOR PAIN 30 tablet 2     tocilizumab 162 mg/0.9 mL PnIj Inject 162 mg into the skin once a week. 4 mL 0     tocilizumab 162 mg/0.9 mL PnIj Inject 162 mg into the skin once a week. 4 mL 6     walker Misc 1 Units by Misc.(Non-Drug; Combo Route) route daily as needed (Rollator Walker). 1 each 0        Review of patient's allergies indicates:   Allergen Reactions    Enbrel [etanercept] Shortness Of Breath     CHF    Nsaids (non-steroidal anti-inflammatory drug) Other (See Comments)     hypertention  Other reaction(s): Other (See Comments)  hypertention  HTN  Patient states he tolerates anti inflammatory eye drops    Statins-hmg-coa reductase inhibitors Other (See Comments)     Heart arrythemias  Other reaction(s): Other (See Comments)  Heart arrythemias  Joint pain and cardiac arrythmias    Pcn [penicillins] Rash       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    forehead    Stroke     TIA     Past Surgical History:   Procedure Laterality Date    CATARACT EXTRACTION W/  INTRAOCULAR LENS IMPLANT Right 3/28/2024    Procedure: EXTRACTION, CATARACT, WITH IOL INSERTION;  Surgeon: Hans Woodward MD;  Location: [facility] OR;  Service: Ophthalmology;  Laterality: Right;    CORONARY ANGIOGRAPHY N/A 3/10/2021    Procedure: ANGIOGRAM, CORONARY ARTERY - right radial;  Surgeon: Shemar Dempsey MD;  Location: [facility] CATH LAB;  Service: Cardiology;  Laterality: N/A;    CORONARY STENT PLACEMENT   03/10/2021    prox-mid RCA Marshal 4.5 x 26 mm, 4.5 x 12 mm    FIXATION KYPHOPLASTY N/A 4/22/2024    Procedure: KYPHOPLASTY;  Surgeon: Stefan Stern MD;  [facility];  Service: Pain Management;  Laterality: N/A;  NO PROPOFOL    INCISION AND DRAINAGE OF SCROTUM N/A 11/15/2022    Procedure: INCISION AND DRAINAGE, SCROTUM;  Surgeon: William Hatch MD;  Location: [facility] OR;  Service: Urology;  Laterality: N/A;    INCISION AND DRAINAGE OF SCROTUM N/A 11/17/2022    Procedure: INCISION AND DRAINAGE, SCROTUM;  Surgeon: William Hatch MD;  Location: [facility] OR;  Service: Urology;  Laterality: N/A;    LUNG LOBECTOMY Right 2008    RUL lobectomy after removal of atrial myxoma    PLEURA BIOPSY      RESECTION OF ATRIAL MYXOMA  2007     Family History       Problem Relation (Age of Onset)    Cancer Sister    Diabetes type I Father    Diabetes type II Mother    Hodgkin's lymphoma Mother    Kidney failure Father          Tobacco Use    Smoking status: Every Day     Current packs/day: 1.00     Average packs/day: 1 pack/day for 35.0 years (35.0 ttl pk-yrs)     Types: Cigarettes    Smokeless tobacco: Never   Substance and Sexual Activity    Alcohol use: Not Currently    Drug use: No    Sexual activity: Never     Review of Systems   Constitutional:  Negative for chills and fever.   HENT:  Negative for rhinorrhea and sore throat.    Eyes:  Negative for pain.   Respiratory:  Negative for cough and shortness of breath.    Cardiovascular:  Negative for chest pain and palpitations.   Gastrointestinal:  Negative for abdominal pain, constipation, nausea and vomiting.   Genitourinary:  Negative for dysuria, frequency and hematuria.   Musculoskeletal:  Positive for back pain. Negative for neck pain.   Skin:  Negative for pallor and rash.   Neurological:  Negative for seizures, facial asymmetry, weakness, numbness and headaches.     Objective:     Weight: 91.3 kg (201 lb 4.5 oz)  Body mass index is 28.07 kg/m².  Vital Signs (Most  "Recent):  Temp: 98.8 °F (37.1 °C) (04/27/24 1118)  Pulse: 102 (04/27/24 1118)  Resp: 17 (04/27/24 1125)  BP: 133/64 (04/27/24 1118)  SpO2: (!) 94 % (04/27/24 1118) Vital Signs (24h Range):  Temp:  [97.9 °F (36.6 °C)-98.8 °F (37.1 °C)] 98.8 °F (37.1 °C)  Pulse:  [] 102  Resp:  [16-22] 17  SpO2:  [89 %-98 %] 94 %  BP: (107-140)/(64-82) 133/64                                 Physical Exam         Neurosurgery Physical Exam    Neurosurgery Physical Exam    General: well developed, well nourished, no distress.   HEENT: normocephalic, atraumatic  CV: regular rate   Pulmonary: normal respirations, no signs of respiratory distress  Abdomen: soft, non-distended, not tender to palpation  Skin: Skin is warm, dry and intact  Heme: no bruising    Neuro:   Mental Status: AO x4  CN II-XII intact  Sensory: intact to light touch throughout  Motor:  5/5 throughout  Reflexes: -Brewer's, -Babinski, no clonus. Patellar: 2+ bilaterally   Gait: deferred  Back: +tenderness to palpation along spinous processes in lumbar region        Significant Labs:  Recent Labs   Lab 04/26/24  1709 04/27/24  0558   * 143*    138   K 3.8 3.8    107   CO2 16* 16*   BUN 9 8   CREATININE 0.8 0.8   CALCIUM 9.0 8.9   MG 1.7 1.9     Recent Labs   Lab 04/26/24  1709 04/26/24  1718 04/27/24  0558   WBC 9.02  --  9.45   HGB 16.0  --  15.2   HCT 47.5 52 46.1     --  262     No results for input(s): "LABPT", "INR", "APTT" in the last 48 hours.  Microbiology Results (last 7 days)       Procedure Component Value Units Date/Time    Clostridium difficile EIA [[id number]] Collected: 04/26/24 2317    Order Status: Completed Specimen: Stool Updated: 04/27/24 1140     C. diff Antigen Negative     C difficile Toxins A+B, EIA Negative     Comment: Testing not recommended for children <24 months old.       Blood Culture #1 **CANNOT BE ORDERED STAT** [[id number]] Collected: 04/26/24 1712    Order Status: Completed Specimen: Blood from " Peripheral, Forearm, Left Updated: 04/27/24 0115     Blood Culture, Routine No Growth to date    Blood Culture #2 **CANNOT BE ORDERED STAT** [[id number]] Collected: 04/26/24 1712    Order Status: Completed Specimen: Blood from Peripheral, Antecubital, Right Updated: 04/27/24 0115     Blood Culture, Routine No Growth to date          All pertinent labs from the last 24 hours have been reviewed.    Significant Diagnostics:  CT: No results found in the last 24 hours.  MRI: No results found in the last 24 hours.

## 2024-04-27 NOTE — HOSPITAL COURSE
Per NSGY L-spine MRI (partially completed) read showed   acute L1 compression fx with mild retropulsion. No severe stenosis. post kyphopasty at T11, L3. C diff was negative, started on imodium. Pt reports improvement in diarrhea; 1 loose nonbloody bm since 4 am, not as watery anymore. CT chest neg for any definitive malignancy, has nonspecific lung nodules, outpt f/u.     5/2  s/p T11 and L3 kyphoplasty with pain management Dr. Aviles on 4/22/2024, now with worsening back pain and diarrhea. Has had multiple ED visits in the past week, CT A/P and MRI show new acute compression fx of L1 with mild retropulsion. In significant pain but remains neuro intact. MRI L sp 4/27 (partially completed): acute L1 compression fx with mild retropulsion. No severe stenosis. S/p kyphopasty at T11, L3. CT chest malignancy workup completed, R upper lobe opacity. Read reports malignancy less likely. s/p NSGY Eval - multimodal pain control with narcotics, tylenol/NSAIDs, muscle relaxants. TLSO brace when upright/OOB. No clear evidence of pathological fracture. consider L1 kyphoplasty for ongoing pain in the future. Patient not interested at this time. needs Optimization of bone quality due to  osteopenia on DXA scan from 2023. awaiting PT session to see if pt can tolerate pain off dilaudid; otherwise  needs kyphoplasty. off  dilaudid and continue oxycodone 5/10mg q 4h PRN.continue tylenol 1000mg q8h, Gabapentin BID and robaxin. X-Ray lumbar lateral supine and upright to assess stability of fracture in the brace- Compression fractures of T11, L1 and L3, status post vertebroplasty at T11 and L3 .  needs SNF. received 3 doses of IV dilaudid 0.5mg . lidocaine patch  5/3 did not receive IV dilaudid today. B12 1500s and folate levels WNL  5/4 tolerating pain with oral oxycodone 10mg q 4h prn. completed robaxin, c/o pain in the left axilla - no skin changes.   5/5 DVT sono left UE - No thrombus in central veins of the left upper extremity.  5/6  c/o left sided testicular pain 4/10. no dysuria. 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 Repeat washout performed 11/17/22. US scrotum and testes -No definite acute testicular findings.  No evidence of torsion at the present time. Small simple appearing right-sided hydrocele. Asymmetrically increased caliber of the left epididymis with relative hypodensity and question of surrounding increased vascularity.  Correlation for signs/symptoms of epididymitis   5/7 improved scrotal pain.   5/8 Discharge to SNF today

## 2024-04-27 NOTE — ASSESSMENT & PLAN NOTE
Hasn't been on lasix   Currently hypovolemic due to diarrhea, s/p 1 L of IVF in the ED  Will encourage PO hydration and utilize IV fluids if needed

## 2024-04-27 NOTE — PLAN OF CARE
Problem: Adult Inpatient Plan of Care  Goal: Plan of Care Review  Outcome: Progressing  Goal: Patient-Specific Goal (Individualized)  Outcome: Progressing     Problem: Diabetes Comorbidity  Goal: Blood Glucose Level Within Targeted Range  Outcome: Progressing     Problem: Infection  Goal: Absence of Infection Signs and Symptoms  Outcome: Progressing     Problem: Wound  Goal: Optimal Coping  Outcome: Progressing  Goal: Optimal Functional Ability  Outcome: Progressing  Goal: Absence of Infection Signs and Symptoms  Outcome: Progressing

## 2024-04-27 NOTE — NURSING
Transport here to take pt downstairs for xray. Pt refused at this time stating that he did not think he could get it because he was in too much pain.. The pt was given both his PO pain meds and IV pain meds prior to transport coming for him. Notified radiology that the pt refused. Also, notified the on call provider Dr. Gleason with [facility] concerning pt's refusal.     0032: MD to room to speak to pt concerning need for MRI and Xray; per MD states the pt would have procedure prior to pain meds being given. Explained to Dr. Gleason that pt was given pain meds just before transport attempted to take him for XRay with him refusing. He also refused the MRI while in the ED due to c/o being in too much pain.      0500: Pt was sent to MRI to attempt scan again. He was given pain meds prior to transport picking him up to take him for the procedure. Pt would not remain still for procedure; returned to floor at this time. Made on call provider aware.

## 2024-04-27 NOTE — ASSESSMENT & PLAN NOTE
C diff testing negative  Afebrile; no abx needed at this time  Imodium prn; consider CT abd if no improvement  Start probiotic  CRP is downtrending

## 2024-04-27 NOTE — ASSESSMENT & PLAN NOTE
C diff testing negative  Afebrile; no abx needed at this time  CT showing   Liquid stool throughout the large bowel and within several small bowel loops, taken together, suggests diarrheal illness possibly in the setting of enteritis. And Left perinephric fat stranding noting somewhat delayed excretion of contrast by the left kidney.     Imodium prn  Start probiotic  CRP is downtrending

## 2024-04-27 NOTE — ASSESSMENT & PLAN NOTE
Due to new L1 fracture     Hydromorphone 1 mg IV q4h prn for severe pain  Oxycodone 10 mg q4h prn for moderate pain   Per spine surgery  MRI L sp 4/27 (partially completed): acute L1 compression fx with mild retropulsion. No severe stenosis. post kyphopasty at T11, L3   Xrays ordered per NSGY to assess for stability; TSLO brace

## 2024-04-27 NOTE — SUBJECTIVE & OBJECTIVE
Interval History:  Patient reports pain really flares up when he moves around but when resting still it is management.  Still reports having 3 water bowel movements 3 hours in the day shift this morning.  C diff results are negative.    Review of Systems  Objective:     Vital Signs (Most Recent):  Temp: 98.8 °F (37.1 °C) (04/27/24 1118)  Pulse: 102 (04/27/24 1118)  Resp: 17 (04/27/24 1308)  BP: 133/64 (04/27/24 1118)  SpO2: (!) 94 % (04/27/24 1118) Vital Signs (24h Range):  Temp:  [97.9 °F (36.6 °C)-98.8 °F (37.1 °C)] 98.8 °F (37.1 °C)  Pulse:  [] 102  Resp:  [16-22] 17  SpO2:  [89 %-96 %] 94 %  BP: (116-140)/(64-82) 133/64     Weight: 91.3 kg (201 lb 4.5 oz)  Body mass index is 28.07 kg/m².    Intake/Output Summary (Last 24 hours) at 4/27/2024 1421  Last data filed at 4/27/2024 0526  Gross per 24 hour   Intake 1470 ml   Output --   Net 1470 ml         Physical Exam      Clear lungs bilaterally, unlabored breathing, on room air, no cyanosis   Heart sounds indicate a regular rate and rhythm  Awake alert, no acute distress  No facial droop, no slurred speech   No obvious lower extremity edema   5/5 hip flexor strength bilaterally   Abdomen is soft nontender, nondistended   5/5 fist  BL

## 2024-04-27 NOTE — ASSESSMENT & PLAN NOTE
Patient is supposed to be on ppx with lamivudine but hasn't been taking it for few weeks  Will hold off resuming it for now, needs to be resumed at discharge

## 2024-04-27 NOTE — ASSESSMENT & PLAN NOTE
Recent hospital visits/surgery place patient at risk for C diff infection, C diff testing sent   Continue supportive care for now   Was prescribed flagyl/cipro yesterday, will hold off on starting antibiotics for now

## 2024-04-27 NOTE — PLAN OF CARE
Problem: Adult Inpatient Plan of Care  Goal: Plan of Care Review  4/27/2024 1704 by Vijay Quiñones RN  Outcome: Progressing  4/27/2024 1704 by Vijay Quiñones RN  Outcome: Progressing  Goal: Patient-Specific Goal (Individualized)  4/27/2024 1704 by Vijay Quiñones RN  Outcome: Progressing  4/27/2024 1704 by Vijay Quiñones RN  Outcome: Progressing  Goal: Absence of Hospital-Acquired Illness or Injury  4/27/2024 1704 by Vijay Quiñones RN  Outcome: Progressing  4/27/2024 1704 by Vijay Quiñones RN  Outcome: Progressing  Goal: Optimal Comfort and Wellbeing  4/27/2024 1704 by Vijay Quiñones RN  Outcome: Progressing  4/27/2024 1704 by Vijay Quiñones RN  Outcome: Progressing  Goal: Readiness for Transition of Care  4/27/2024 1704 by Vijay Quiñones RN  Outcome: Progressing  4/27/2024 1704 by Vijay Quiñones RN  Outcome: Progressing     Problem: Diabetes Comorbidity  Goal: Blood Glucose Level Within Targeted Range  4/27/2024 1704 by Vijay Quiñones RN  Outcome: Progressing  4/27/2024 1704 by Vijay Quiñones RN  Outcome: Progressing     Problem: Infection  Goal: Absence of Infection Signs and Symptoms  4/27/2024 1704 by Vijay Quiñones RN  Outcome: Progressing  4/27/2024 1704 by Vijay Quiñones RN  Outcome: Progressing     Problem: Wound  Goal: Optimal Coping  4/27/2024 1704 by Vijay Quiñones RN  Outcome: Progressing  4/27/2024 1704 by Vijay Quiñones RN  Outcome: Progressing  Goal: Optimal Functional Ability  4/27/2024 1704 by Vijay Quiñones RN  Outcome: Progressing  4/27/2024 1704 by Vijay Quiñones RN  Outcome: Progressing  Goal: Absence of Infection Signs and Symptoms  4/27/2024 1704 by Vijay Quiñones RN  Outcome: Progressing  4/27/2024 1704 by Vijay Quiñones RN  Outcome: Progressing  Goal: Improved Oral Intake  4/27/2024 1704 by Vijay Quiñones RN  Outcome: Progressing  4/27/2024 1704 by [place], Vijay, RN  Outcome: Progressing  Goal: Optimal Pain Control and Function  4/27/2024 1704 by Vijay Quiñones, RN  Outcome: Progressing  4/27/2024 1704 by [place],  BE Lina[place]  Outcome: Progressing  Goal: Skin Health and Integrity  4/27/2024 1704 by Vijay Quiñones RN  Outcome: Progressing  4/27/2024 1704 by Vijay Quiñones RN  Outcome: Progressing  Goal: Optimal Wound Healing  4/27/2024 1704 by Vijay Quiñones RN  Outcome: Progressing  4/27/2024 1704 by Vijay Quiñones RN  Outcome: Progressing

## 2024-04-27 NOTE — PROGRESS NOTES
[facility] - Neurosurgery ([facility])  [facility] Medicine  Progress Note    Patient Name: Nithin Wagner  MRN: [id number]  Patient Class: OP- Observation   Admission Date: 4/26/2024  Length of Stay: 0 days  Attending Physician: Jose Hagen MD  Primary Care Provider: Tushar Drew MD        Subjective:     Principal Problem:Intractable pain        HPI:  Pt is a 68yoM s/p s/p T11 and L3 kyphoplasty on 4/22/2024, now with worsening back pain and diarrhea. Has had multiple ED visits in the past week, CT A/P done yesterday showed Interval compression deformity involving the superior endplate of L1 as well as enteritis, he was discharged home on cipro/flagyl but never started taking them. Was seen by pain management and recommended admission for intractable pain.     in the ED, vital signs are stable, cbc/cmp unremarkable, ESR/CRP down trending.  patient has no red flags for infection/spinal involvement beyond pain and has reassuring neurological examination.  Patient has  required 2 mg of IV hydromorphone.      At bedside, complained of low back pain, denies worsening numbness in his legs, no fecal/urinary incontinence, no fevers, no night sweats. Complained of watery diarrhea for few days, he states that few of his friends have contracted a diarrheal illness as well. Diarrhea is associated with abdominal pain, no nausea/no vomiting. Hasn't been able to eat well and hasn't taking most of his medications in the past few days.     Overview/Hospital Course:  Per NSGY L-spine MRI (partially completed) read showed   acute L1 compression fx with mild retropulsion. No severe stenosis. post kyphopasty at T11, L3.     Interval History:  Patient reports pain really flares up when he moves around but when resting still it is management.  Still reports having 3 water bowel movements 3 hours in the day shift this morning.  C diff results are negative.    Review of Systems  Objective:     Vital Signs (Most Recent):  Temp: 98.8 °F  (37.1 °C) (04/27/24 1118)  Pulse: 102 (04/27/24 1118)  Resp: 17 (04/27/24 1308)  BP: 133/64 (04/27/24 1118)  SpO2: (!) 94 % (04/27/24 1118) Vital Signs (24h Range):  Temp:  [97.9 °F (36.6 °C)-98.8 °F (37.1 °C)] 98.8 °F (37.1 °C)  Pulse:  [] 102  Resp:  [16-22] 17  SpO2:  [89 %-96 %] 94 %  BP: (116-140)/(64-82) 133/64     Weight: 91.3 kg (201 lb 4.5 oz)  Body mass index is 28.07 kg/m².    Intake/Output Summary (Last 24 hours) at 4/27/2024 1421  Last data filed at 4/27/2024 0526  Gross per 24 hour   Intake 1470 ml   Output --   Net 1470 ml         Physical Exam      Clear lungs bilaterally, unlabored breathing, on room air, no cyanosis   Heart sounds indicate a regular rate and rhythm  Awake alert, no acute distress  No facial droop, no slurred speech   No obvious lower extremity edema   5/5 hip flexor strength bilaterally   Abdomen is soft nontender, nondistended   5/5 fist  BL     Assessment/Plan:      * Intractable pain  Due to new L1 fracture     Hydromorphone 1 mg IV q4h prn for severe pain  Oxycodone 10 mg q4h prn for moderate pain   Per spine surgery  MRI L sp 4/27 (partially completed): acute L1 compression fx with mild retropulsion. No severe stenosis. post kyphopasty at T11, L3   Xrays ordered per NSGY to assess for stability; TSLO brace        Lumbar vertebral fracture  See above      Diarrhea of presumed infectious origin  C diff testing negative  Afebrile; no abx needed at this time  Imodium prn; consider CT abd if no improvement  Start probiotic  CRP is downtrending      Rheumatoid arthritis involving multiple sites with positive rheumatoid factor  Hx of RA on tocilizumab, last in march 2024, supposed to be on lamivudine for hep B ppx    Supraventricular tachycardia  Hx of SVT on digoxin, coreg but hasn't been taking coreg    Continue digoxin for now   Will give IV magnesium 2gm  Optimize potassium > 4    Coronary artery disease  Hx of CAD  Hold ASA for now     Type 2 diabetes mellitus with  hyperglycemia, with long-term current use of insulin  Hasn't been taking insulin  Will hold off on scheduled insulin  Monitor glucose AC/HS and utilize prn insulin       Systolic heart failure  Hasn't been on lasix   Currently hypovolemic due to diarrhea, s/p 1 L of IVF in the ED  Will encourage PO hydration and utilize IV fluids if needed       Hepatitis B core antibody positive  Patient is supposed to be on ppx with lamivudine but hasn't been taking it for few weeks  Will hold off resuming it for now, needs to be resumed at discharge         VTE Risk Mitigation (From admission, onward)           Ordered     enoxaparin injection 40 mg  Daily         04/27/24 0133     IP VTE HIGH RISK PATIENT  Once         04/27/24 0133     Place sequential compression device  Until discontinued         04/27/24 0133                    Discharge Planning   ALEXANDER:      Code Status: Full Code   Is the patient medically ready for discharge?:     Reason for patient still in hospital (select all that apply): Patient trending condition, Laboratory test, Treatment, Imaging, and Consult recommendations                     Jose Hagen MD  Department of Hospital Medicine   [facility] - Neurosurgery ([facility])

## 2024-04-27 NOTE — ASSESSMENT & PLAN NOTE
Mr. Wagner is a 68M s/p T11 and L3 kyphoplasty with pain management Dr. Aviles on 4/22/2024, now with worsening back pain and diarrhea. Has had multiple ED visits in the past week, CT A/P and MRI show new acute compression fx of L1 with mild retropulsion. In significant pain but remains neuro intact.    MRI L sp 4/27 (partially completed): acute L1 compression fx with mild retropulsion. No severe stenosis. post kyphopasty at T11, L3     - admitted to medicine for pain control  - recommend TLSO brace, standing thoracolumbar XR in TLSO brace to assess stability  - if MRI repeated, would obtain with contrast to rule out underlying pathologic fx given atraumatic progression and recent wt loss  - consideration of further kyphoplasty at L1  - optimization of bone quality per primary team and endocrine

## 2024-04-27 NOTE — SUBJECTIVE & OBJECTIVE
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    forehead    Stroke     TIA       Past Surgical History:   Procedure Laterality Date    CATARACT EXTRACTION W/  INTRAOCULAR LENS IMPLANT Right 3/28/2024    Procedure: EXTRACTION, CATARACT, WITH IOL INSERTION;  Surgeon: Hans Woodward MD;  Location: [facility] OR;  Service: Ophthalmology;  Laterality: Right;    CORONARY ANGIOGRAPHY N/A 3/10/2021    Procedure: ANGIOGRAM, CORONARY ARTERY - right radial;  Surgeon: Shemar Dempsey MD;  Location: [facility] CATH LAB;  Service: Cardiology;  Laterality: N/A;    CORONARY STENT PLACEMENT  03/10/2021    prox-mid RCA Marshal 4.5 x 26 mm, 4.5 x 12 mm    FIXATION KYPHOPLASTY N/A 4/22/2024    Procedure: KYPHOPLASTY;  Surgeon: Stefan Stern MD;  Location: [facility] OR;  Service: Pain Management;  Laterality: N/A;  NO PROPOFOL    INCISION AND DRAINAGE OF SCROTUM N/A 11/15/2022    Procedure: INCISION AND DRAINAGE, SCROTUM;  Surgeon: William Hatch MD;  Location: [facility] OR;  Service: Urology;  Laterality: N/A;    INCISION AND DRAINAGE OF SCROTUM N/A 11/17/2022    Procedure: INCISION AND DRAINAGE, SCROTUM;  Surgeon: William Hatch MD;  Location: [facility];  Service: Urology;  Laterality: N/A;    LUNG LOBECTOMY Right 2008    RUL lobectomy after removal of atrial myxoma    PLEURA BIOPSY      RESECTION OF ATRIAL MYXOMA  2007       Review of patient's allergies indicates:   Allergen Reactions    Enbrel [etanercept] Shortness Of Breath     CHF    Nsaids (non-steroidal anti-inflammatory drug) Other (See Comments)     hypertention  Other reaction(s): Other (See  "Comments)  hypertention  HTN  Patient states he tolerates anti inflammatory eye drops    Statins-hmg-coa reductase inhibitors Other (See Comments)     Heart arrythemias  Other reaction(s): Other (See Comments)  Heart arrythemias  Joint pain and cardiac arrythmias    Pcn [penicillins] Rash       Current Facility-Administered Medications   Medication Dose Route Frequency Provider Last Rate Last Admin    HYDROmorphone injection 1 mg  1 mg Intravenous Q4H PRN Victorino Arreaga MD        oxyCODONE immediate release tablet 10 mg  10 mg Oral Q4H PRN Victorino Arreaga MD        sodium chloride 0.9% flush 10 mL  10 mL Intravenous PRN Hans Woodward MD         Current Outpatient Medications   Medication Sig Dispense Refill    aspirin 81 MG Chew Take 81 mg by mouth.      BD ULTRA-FINE RAHUL PEN NEEDLE 32 gauge x 5/32" Ndle To use 6 daily 400 each 3    carvediloL (COREG) 6.25 MG tablet Take 1 tablet (6.25 mg total) by mouth 2 (two) times daily with meals. 180 tablet 3    ciprofloxacin HCl (CIPRO) 500 MG tablet Take 1 tablet (500 mg total) by mouth 2 (two) times daily. for 7 days 14 tablet 0    digoxin (LANOXIN) 125 mcg tablet TAKE 1 TABLET BY MOUTH ONCE DAILY. 90 tablet 3    diltiaZEM (CARDIZEM CD) 120 MG Cp24 Take 120 mg by mouth Daily.      ergocalciferol (ERGOCALCIFEROL) 50,000 unit Cap Take 1 capsule (50,000 Units total) by mouth every 7 days. 12 capsule 3    EScitalopram oxalate (LEXAPRO) 20 MG tablet Take 20 mg by mouth every evening.      folic acid (FOLVITE) 1 MG tablet Take 1 tablet (1 mg total) by mouth once daily. 30 tablet 5    furosemide (LASIX) 80 MG tablet Take 1 tablet (80 mg total) by mouth 2 (two) times a day. 180 tablet 3    gabapentin (NEURONTIN) 300 MG capsule 2 (two) times daily.      HYDROmorphone (DILAUDID) 4 MG tablet TAKE 1½ Tablets by mouth EVERY 6 HOURS as needed for SEVERE PAIN 60 tablet 0    HYDROmorphone (DILAUDID) 4 MG tablet TAKE ONE TABLET BY MOUTH EVERY 6 HOURS AS NEEDED FOR SEVERE " PAIN 4 tablet 0    HYDROmorphone (DILAUDID) 4 MG tablet TAKE ONE TAB PO Q 6 HOURS PRN SEVERE PAIN 4 tablet 0    HYDROmorphone (DILAUDID) 4 MG tablet TAKE ONE TABLET BY MOUTH EVERY 6 HOURS AS NEEDED FOR SEVERE PAIN 12 tablet 0    insulin aspart, niacinamide, (FIASP PENFILL U-100 INSULIN) 100 unit/mL (3 mL) Crtg pen To use with inPen; 140 max daily dose. 14 pen 11    insulin degludec (TRESIBA FLEXTOUCH U-200) 200 unit/mL (3 mL) insulin pen Inject 60 Units into the skin once daily. Cartridge for novoecho (Patient taking differently: Inject 40 Units into the skin once daily. Cartridge for novoecho) 3 pen 11    ketoconazole (NIZORAL) 2 % cream Apply to affected area DAILY 30 g 3    lamiVUDine (EPIVIR) 150 MG Tab TAKE 1 TABLET BY MOUTH EVERY DAY 30 tablet 23    metoprolol tartrate (LOPRESSOR) 50 MG tablet Take 50 mg by mouth every 12 (twelve) hours.      metroNIDAZOLE (FLAGYL) 500 MG tablet Take 1 tablet (500 mg total) by mouth every 12 (twelve) hours. for 7 days 14 tablet 0    moxifloxacin (VIGAMOX) 0.5 % ophthalmic solution Place 1 drop into the right eye 3 (three) times daily. 3 mL 3    multivitamin (THERAGRAN) per tablet Take 1 tablet by mouth once daily.      mupirocin (BACTROBAN) 2 % ointment Apply to affected area 3 times daily 22 g 1    nitroGLYCERIN (NITROSTAT) 0.4 MG SL tablet PLACE 1 TABLET UNDER THE TONGUE EVERY 5 MINUTES AS NEEDED FOR CHEST PAIN. 100 tablet 2    nystatin (MYCOSTATIN) powder Apply topically 4 (four) times daily. Apply to intertriginous areas as directed up to four times daily to reduce moisture. for 14 days 30 g 0    olmesartan (BENICAR) 40 MG tablet TAKE 1 TABLET BY MOUTH EVERY DAY AT NIGHT 90 tablet 3    oxyCODONE-acetaminophen (PERCOCET)  mg per tablet Take 1 tablet by mouth every 6 (six) hours as needed for Pain. 12 tablet 0    oxyCODONE-acetaminophen (PERCOCET) 5-325 mg per tablet Take 1 tablet by mouth every 6 (six) hours as needed for Pain. 12 tablet 0    prednisoLONE acetate  (PRED FORTE) 1 % DrpS Place 1 drop into the right eye 3 (three) times daily. 10 mL 3    predniSONE (DELTASONE) 2.5 MG tablet TAKE 5 TABLETS (12.5 MG TOTAL) BY MOUTH ONCE DAILY. 150 tablet 2    predniSONE (DELTASONE) 5 MG tablet TAKE 2 TABLETS BY MOUTH ONCE DAILY. 60 tablet 3    PROAIR HFA 90 mcg/actuation inhaler       tirzepatide 7.5 mg/0.5 mL PnIj Inject 7.5 mg into the skin every 7 days. (Patient taking differently: Inject 7.5 mg into the skin every 7 days. THURSDAYS, LAST DOSE 4/11/24) 4 Pen 11    tiZANidine (ZANAFLEX) 4 MG tablet TAKE 1 TABLET BY MOUTH EVERY 8 HOURS AS NEEDED FOR PAIN 30 tablet 2    tocilizumab 162 mg/0.9 mL PnIj Inject 162 mg into the skin once a week. 4 mL 0    tocilizumab 162 mg/0.9 mL PnIj Inject 162 mg into the skin once a week. 4 mL 6    walker Misc 1 Units by Misc.(Non-Drug; Combo Route) route daily as needed (Rollator Walker). 1 each 0     Family History       Problem Relation (Age of Onset)    Cancer Sister    Diabetes type I Father    Diabetes type II Mother    Hodgkin's lymphoma Mother    Kidney failure Father          Tobacco Use    Smoking status: Every Day     Current packs/day: 1.00     Average packs/day: 1 pack/day for 35.0 years (35.0 ttl pk-yrs)     Types: Cigarettes    Smokeless tobacco: Never   Substance and Sexual Activity    Alcohol use: Not Currently    Drug use: No    Sexual activity: Never     Review of Systems   Constitutional:  Positive for fatigue. Negative for chills and fever.   HENT:  Negative for rhinorrhea and sore throat.    Respiratory:  Negative for cough, shortness of breath and wheezing.    Cardiovascular:  Negative for chest pain, palpitations and leg swelling.   Gastrointestinal:  Positive for abdominal pain and diarrhea. Negative for nausea and vomiting.   Genitourinary:  Negative for dysuria and flank pain.   Musculoskeletal:  Positive for back pain.   Skin:  Negative for rash and wound.   Neurological:  Positive for numbness. Negative for weakness.    Psychiatric/Behavioral:  Negative for agitation, behavioral problems and confusion.      Objective:     Vital Signs (Most Recent):  Temp: 98.6 °F (37 °C) (04/26/24 1525)  Pulse: 99 (04/26/24 1947)  Resp: 16 (04/26/24 2033)  BP: (!) 140/73 (04/26/24 1947)  SpO2: 95 % (04/26/24 1947) Vital Signs (24h Range):  Temp:  [98.2 °F (36.8 °C)-98.6 °F (37 °C)] 98.6 °F (37 °C)  Pulse:  [] 99  Resp:  [16-22] 16  SpO2:  [95 %-98 %] 95 %  BP: (107-140)/(66-73) 140/73     Weight: 91.3 kg (201 lb 4.5 oz)  Body mass index is 28.07 kg/m².     Physical Exam  Constitutional:       General: He is not in acute distress.     Appearance: Normal appearance. He is not toxic-appearing.   HENT:      Head: Normocephalic and atraumatic.      Nose: Nose normal.      Mouth/Throat:      Mouth: Mucous membranes are moist.      Pharynx: Oropharynx is clear.   Eyes:      General: No scleral icterus.     Conjunctiva/sclera: Conjunctivae normal.   Cardiovascular:      Rate and Rhythm: Normal rate.   Pulmonary:      Effort: Pulmonary effort is normal. No respiratory distress.      Breath sounds: No wheezing.   Abdominal:      General: There is no distension.      Palpations: Abdomen is soft.      Tenderness: There is no abdominal tenderness.   Musculoskeletal:         General: Tenderness (lower back) present.      Cervical back: Normal range of motion.      Right lower leg: No edema.      Left lower leg: No edema.   Skin:     General: Skin is warm and dry.      Findings: No rash.   Neurological:      Mental Status: He is alert and oriented to person, place, and time.      Motor: No weakness.   Psychiatric:         Mood and Affect: Mood normal.         Behavior: Behavior normal.         Thought Content: Thought content normal.                Significant Labs: CBC:   Recent Labs   Lab 04/25/24  1130 04/26/24  1709 04/26/24  1718   WBC 7.58 9.02  --    HGB 15.8 16.0  --    HCT 47.3 47.5 52    288  --      CMP:   Recent Labs   Lab  04/25/24  1130 04/26/24  1709    137   K 3.9 3.8    106   CO2 22* 16*   * 148*   BUN 11 9   CREATININE 0.8 0.8   CALCIUM 9.4 9.0   PROT  --  6.4   ALBUMIN  --  2.8*   BILITOT  --  0.4   ALKPHOS  --  138*   AST  --  13   ALT  --  10   ANIONGAP 13 15       Significant Imaging: I have reviewed all pertinent imaging results/findings within the past 24 hours.

## 2024-04-27 NOTE — CONSULTS
"[facility] - Neurosurgery ([facility])  Neurosurgery  Consult Note    Inpatient consult to Neurosurgery  Consult performed by: Lexi Gee MD  Consult ordered by: Victorino Arreaga MD        Subjective:     Chief Complaint/Reason for Admission: L1 compression fx    History of Present Illness: Mr. Wagner is a 68M s/p T11 and L3 kyphoplasty with pain management Dr. Aviles on 4/22/2024, now with worsening back pain and diarrhea. Has had multiple ED visits in the past week, CT A/P and MRI show new acute compression fx of L1 with mild retropulsion. Patient denies any recent trauma or falls. Has severe axial low back pain, no radicular pain. Able to walk with cane, but has severe pain with any movements, is most comfortable lying still in bed. No weakness, numbness, or bladder sx. Has diarrhea, on c diff rule out. Does not yet have TLSO brace.     Facility-Administered Medications Prior to Admission   Medication Dose Route Frequency Provider Last Rate Last Admin    sodium chloride 0.9% flush 10 mL  10 mL Intravenous PRN Hans Woodward MD         Medications Prior to Admission   Medication Sig Dispense Refill Last Dose    aspirin 81 MG Chew Take 81 mg by mouth.       BD ULTRA-FINE RAHUL PEN NEEDLE 32 gauge x 5/32" Ndle To use 6 daily 400 each 3     carvediloL (COREG) 6.25 MG tablet Take 1 tablet (6.25 mg total) by mouth 2 (two) times daily with meals. 180 tablet 3     ciprofloxacin HCl (CIPRO) 500 MG tablet Take 1 tablet (500 mg total) by mouth 2 (two) times daily. for 7 days 14 tablet 0     digoxin (LANOXIN) 125 mcg tablet TAKE 1 TABLET BY MOUTH ONCE DAILY. 90 tablet 3     diltiaZEM (CARDIZEM CD) 120 MG Cp24 Take 120 mg by mouth Daily.       ergocalciferol (ERGOCALCIFEROL) 50,000 unit Cap Take 1 capsule (50,000 Units total) by mouth every 7 days. 12 capsule 3     EScitalopram oxalate (LEXAPRO) 20 MG tablet Take 20 mg by mouth every evening.       folic acid (FOLVITE) 1 MG tablet Take 1 tablet (1 mg total) by mouth " once daily. 30 tablet 5     furosemide (LASIX) 80 MG tablet Take 1 tablet (80 mg total) by mouth 2 (two) times a day. 180 tablet 3     gabapentin (NEURONTIN) 300 MG capsule 2 (two) times daily.       HYDROmorphone (DILAUDID) 4 MG tablet TAKE 1½ Tablets by mouth EVERY 6 HOURS as needed for SEVERE PAIN 60 tablet 0     HYDROmorphone (DILAUDID) 4 MG tablet TAKE ONE TABLET BY MOUTH EVERY 6 HOURS AS NEEDED FOR SEVERE PAIN 4 tablet 0     HYDROmorphone (DILAUDID) 4 MG tablet TAKE ONE TAB PO Q 6 HOURS PRN SEVERE PAIN 4 tablet 0     HYDROmorphone (DILAUDID) 4 MG tablet TAKE ONE TABLET BY MOUTH EVERY 6 HOURS AS NEEDED FOR SEVERE PAIN 12 tablet 0     insulin aspart, niacinamide, (FIASP PENFILL U-100 INSULIN) 100 unit/mL (3 mL) Crtg pen To use with inPen; 140 max daily dose. 14 pen 11     insulin degludec (TRESIBA FLEXTOUCH U-200) 200 unit/mL (3 mL) insulin pen Inject 60 Units into the skin once daily. Cartridge for novoecho (Patient taking differently: Inject 40 Units into the skin once daily. Cartridge for novoecho) 3 pen 11     ketoconazole (NIZORAL) 2 % cream Apply to affected area DAILY 30 g 3     lamiVUDine (EPIVIR) 150 MG Tab TAKE 1 TABLET BY MOUTH EVERY DAY 30 tablet 23     metoprolol tartrate (LOPRESSOR) 50 MG tablet Take 50 mg by mouth every 12 (twelve) hours.       metroNIDAZOLE (FLAGYL) 500 MG tablet Take 1 tablet (500 mg total) by mouth every 12 (twelve) hours. for 7 days 14 tablet 0     moxifloxacin (VIGAMOX) 0.5 % ophthalmic solution Place 1 drop into the right eye 3 (three) times daily. 3 mL 3     multivitamin (THERAGRAN) per tablet Take 1 tablet by mouth once daily.       mupirocin (BACTROBAN) 2 % ointment Apply to affected area 3 times daily 22 g 1     nitroGLYCERIN (NITROSTAT) 0.4 MG SL tablet PLACE 1 TABLET UNDER THE TONGUE EVERY 5 MINUTES AS NEEDED FOR CHEST PAIN. 100 tablet 2     nystatin (MYCOSTATIN) powder Apply topically 4 (four) times daily. Apply to intertriginous areas as directed up to four times  daily to reduce moisture. for 14 days 30 g 0     olmesartan (BENICAR) 40 MG tablet TAKE 1 TABLET BY MOUTH EVERY DAY AT NIGHT 90 tablet 3     oxyCODONE-acetaminophen (PERCOCET)  mg per tablet Take 1 tablet by mouth every 6 (six) hours as needed for Pain. 12 tablet 0     oxyCODONE-acetaminophen (PERCOCET) 5-325 mg per tablet Take 1 tablet by mouth every 6 (six) hours as needed for Pain. 12 tablet 0     prednisoLONE acetate (PRED FORTE) 1 % DrpS Place 1 drop into the right eye 3 (three) times daily. 10 mL 3     predniSONE (DELTASONE) 2.5 MG tablet TAKE 5 TABLETS (12.5 MG TOTAL) BY MOUTH ONCE DAILY. 150 tablet 2     predniSONE (DELTASONE) 5 MG tablet TAKE 2 TABLETS BY MOUTH ONCE DAILY. 60 tablet 3     PROAIR HFA 90 mcg/actuation inhaler        tirzepatide 7.5 mg/0.5 mL PnIj Inject 7.5 mg into the skin every 7 days. (Patient taking differently: Inject 7.5 mg into the skin every 7 days. THURSDAYS, LAST DOSE 4/11/24) 4 Pen 11     tiZANidine (ZANAFLEX) 4 MG tablet TAKE 1 TABLET BY MOUTH EVERY 8 HOURS AS NEEDED FOR PAIN 30 tablet 2     tocilizumab 162 mg/0.9 mL PnIj Inject 162 mg into the skin once a week. 4 mL 0     tocilizumab 162 mg/0.9 mL PnIj Inject 162 mg into the skin once a week. 4 mL 6     walker Misc 1 Units by Misc.(Non-Drug; Combo Route) route daily as needed (Rollator Walker). 1 each 0        Review of patient's allergies indicates:   Allergen Reactions    Enbrel [etanercept] Shortness Of Breath     CHF    Nsaids (non-steroidal anti-inflammatory drug) Other (See Comments)     hypertention  Other reaction(s): Other (See Comments)  hypertention  HTN  Patient states he tolerates anti inflammatory eye drops    Statins-hmg-coa reductase inhibitors Other (See Comments)     Heart arrythemias  Other reaction(s): Other (See Comments)  Heart arrythemias  Joint pain and cardiac arrythmias    Pcn [penicillins] Rash       Past Medical History:   Diagnosis Date    Arthritis     Atrial myxoma     Coronary atherosclerosis  2020    stents x 3    Diabetes mellitus, type 2     Difficult intubation     Heart failure     Hepatitis B     HPV (human papilloma virus) infection     Hyperlipidemia     Hypertension     Non-alcoholic fatty liver disease     Rheumatoid arthritis     Rheumatoid arthritis flare 07/12/2021    Squamous cell carcinoma of forehead 10/26/2023    forehead    Stroke     TIA     Past Surgical History:   Procedure Laterality Date    CATARACT EXTRACTION W/  INTRAOCULAR LENS IMPLANT Right 3/28/2024    Procedure: EXTRACTION, CATARACT, WITH IOL INSERTION;  Surgeon: Hans Woodward MD;  [facility];  Service: Ophthalmology;  Laterality: Right;    CORONARY ANGIOGRAPHY N/A 3/10/2021    Procedure: ANGIOGRAM, CORONARY ARTERY - right radial;  Surgeon: Shemar Dempsey MD;  Location: [facility] CATH LAB;  Service: Cardiology;  Laterality: N/A;    CORONARY STENT PLACEMENT  03/10/2021    prox-mid RCA [address] 4.5 x 26 mm, 4.5 x 12 mm    FIXATION KYPHOPLASTY N/A 4/22/2024    Procedure: KYPHOPLASTY;  Surgeon: Stefan Stern MD;  Location: [facility];  Service: Pain Management;  Laterality: N/A;  NO PROPOFOL    INCISION AND DRAINAGE OF SCROTUM N/A 11/15/2022    Procedure: INCISION AND DRAINAGE, SCROTUM;  Surgeon: William Hatch MD;  Location: [facility] OR;  Service: Urology;  Laterality: N/A;    INCISION AND DRAINAGE OF SCROTUM N/A 11/17/2022    Procedure: INCISION AND DRAINAGE, SCROTUM;  Surgeon: William Hatch MD;  Location: [facility];  Service: Urology;  Laterality: N/A;    LUNG LOBECTOMY Right 2008    RUL lobectomy after removal of atrial myxoma    PLEURA BIOPSY      RESECTION OF ATRIAL MYXOMA  2007     Family History       Problem Relation (Age of Onset)    Cancer Sister    Diabetes type I Father    Diabetes type II Mother    Hodgkin's lymphoma Mother    Kidney failure Father          Tobacco Use    Smoking status: Every Day     Current packs/day: 1.00     Average packs/day: 1 pack/day for 35.0 years (35.0 ttl pk-yrs)     Types: Cigarettes     Smokeless tobacco: Never   Substance and Sexual Activity    Alcohol use: Not Currently    Drug use: No    Sexual activity: Never     Review of Systems   Constitutional:  Negative for chills and fever.   HENT:  Negative for rhinorrhea and sore throat.    Eyes:  Negative for pain.   Respiratory:  Negative for cough and shortness of breath.    Cardiovascular:  Negative for chest pain and palpitations.   Gastrointestinal:  Negative for abdominal pain, constipation, nausea and vomiting.   Genitourinary:  Negative for dysuria, frequency and hematuria.   Musculoskeletal:  Positive for back pain. Negative for neck pain.   Skin:  Negative for pallor and rash.   Neurological:  Negative for seizures, facial asymmetry, weakness, numbness and headaches.     Objective:     Weight: 91.3 kg (201 lb 4.5 oz)  Body mass index is 28.07 kg/m².  Vital Signs (Most Recent):  Temp: 98.8 °F (37.1 °C) (04/27/24 1118)  Pulse: 102 (04/27/24 1118)  Resp: 17 (04/27/24 1125)  BP: 133/64 (04/27/24 1118)  SpO2: (!) 94 % (04/27/24 1118) Vital Signs (24h Range):  Temp:  [97.9 °F (36.6 °C)-98.8 °F (37.1 °C)] 98.8 °F (37.1 °C)  Pulse:  [] 102  Resp:  [16-22] 17  SpO2:  [89 %-98 %] 94 %  BP: (107-140)/(64-82) 133/64                                 Physical Exam         Neurosurgery Physical Exam    Neurosurgery Physical Exam    General: well developed, well nourished, no distress.   HEENT: normocephalic, atraumatic  CV: regular rate   Pulmonary: normal respirations, no signs of respiratory distress  Abdomen: soft, non-distended, not tender to palpation  Skin: Skin is warm, dry and intact  Heme: no bruising    Neuro:   Mental Status: AO x4  CN II-XII intact  Sensory: intact to light touch throughout  Motor:  5/5 throughout  Reflexes: -Brewer's, -Babinski, no clonus. Patellar: 2+ bilaterally   Gait: deferred  Back: +tenderness to palpation along spinous processes in lumbar region        Significant Labs:  Recent Labs   Lab 04/26/24  4526  "04/27/24  0558   * 143*    138   K 3.8 3.8    107   CO2 16* 16*   BUN 9 8   CREATININE 0.8 0.8   CALCIUM 9.0 8.9   MG 1.7 1.9     Recent Labs   Lab 04/26/24  1709 04/26/24  1718 04/27/24  0558   WBC 9.02  --  9.45   HGB 16.0  --  15.2   HCT 47.5 52 46.1     --  262     No results for input(s): "LABPT", "INR", "APTT" in the last 48 hours.  Microbiology Results (last 7 days)       Procedure Component Value Units Date/Time    Clostridium difficile EIA [[id number]] Collected: 04/26/24 2317    Order Status: Completed Specimen: Stool Updated: 04/27/24 1140     C. diff Antigen Negative     C difficile Toxins A+B, EIA Negative     Comment: Testing not recommended for children <24 months old.       Blood Culture #1 **CANNOT BE ORDERED STAT** [[id number]] Collected: 04/26/24 1712    Order Status: Completed Specimen: Blood from Peripheral, Forearm, Left Updated: 04/27/24 0115     Blood Culture, Routine No Growth to date    Blood Culture #2 **CANNOT BE ORDERED STAT** [[id number]] Collected: 04/26/24 1712    Order Status: Completed Specimen: Blood from Peripheral, Antecubital, Right Updated: 04/27/24 0115     Blood Culture, Routine No Growth to date          All pertinent labs from the last 24 hours have been reviewed.    Significant Diagnostics:  CT: No results found in the last 24 hours.  MRI: No results found in the last 24 hours.  Assessment/Plan:     Lumbar vertebral fracture  Mr. Wagner is a 68M s/p T11 and L3 kyphoplasty with pain management Dr. Aviles on 4/22/2024, now with worsening back pain and diarrhea. Has had multiple ED visits in the past week, CT A/P and MRI show new acute compression fx of L1 with mild retropulsion. In significant pain but remains neuro intact.    MRI L sp 4/27 (partially completed): acute L1 compression fx with mild retropulsion. No severe stenosis. post kyphopasty at T11, L3     - admitted to medicine for pain control  - recommend TLSO brace, standing thoracolumbar XR " in TLSO brace to assess stability  - if MRI repeated, would obtain with contrast to rule out underlying pathologic fx given atraumatic progression and recent wt loss  - consideration of further kyphoplasty at L1  - optimization of bone quality per primary team and endocrine            Thank you for your consult. I will follow-up with patient. Please contact us if you have any additional questions.    Lexi Gee MD  Neurosurgery  [facility] - Neurosurgery ([facility])

## 2024-04-27 NOTE — ASSESSMENT & PLAN NOTE
Hx of SVT on digoxin, coreg but hasn't been taking coreg    Continue digoxin for now   Will give IV magnesium 2gm  Optimize potassium > 4

## 2024-04-27 NOTE — H&P
"[facility] - Emergency Dept  [facility]  History & Physical    Patient Name: Nithin Wagner  MRN: [id number]  Patient Class: OP- Observation  Admission Date: 4/26/2024  Attending Physician: Jose aHgen MD   Primary Care Provider: Tushar Drew MD      Patient information was obtained from patient and ER records.     Subjective:     Principal Problem:Intractable pain    Chief Complaint:   Chief Complaint   Patient presents with    Multiple Complaints     Patient presents for severe back pain, N/V/D, and 20 lb weight loss within the last week. Reports he was previously at [facility] and instructed to come to [facility] ER so "I can get a room".        HPI: Pt is a 68yoM s/p s/p T11 and L3 kyphoplasty on 4/22/2024, now with worsening back pain and diarrhea. Has had multiple ED visits in the past week, CT A/P done yesterday showed Interval compression deformity involving the superior endplate of L1 as well as enteritis, he was discharged home on cipro/flagyl but never started taking them. Was seen by pain management and recommended admission for intractable pain.     in the ED, vital signs are stable, cbc/cmp unremarkable, ESR/CRP down trending.  patient has no red flags for infection/spinal involvement beyond pain and has reassuring neurological examination.  Patient has  required 2 mg of IV hydromorphone.      At bedside, complained of low back pain, denies worsening numbness in his legs, no fecal/urinary incontinence, no fevers, no night sweats. Complained of watery diarrhea for few days, he states that few of his friends have contracted a diarrheal illness as well. Diarrhea is associated with abdominal pain, no nausea/no vomiting. Hasn't been able to eat well and hasn't taking most of his medications in the past few days.     Past Medical History:   Diagnosis Date    Arthritis     Atrial myxoma     Coronary atherosclerosis 2020    stents x 3    Diabetes mellitus, type 2     Difficult intubation     Heart failure "     Hepatitis B     HPV (human papilloma virus) infection     Hyperlipidemia     Hypertension     Non-alcoholic fatty liver disease     Rheumatoid arthritis     Rheumatoid arthritis flare 07/12/2021    Squamous cell carcinoma of forehead 10/26/2023    forehead    Stroke     TIA       Past Surgical History:   Procedure Laterality Date    CATARACT EXTRACTION W/  INTRAOCULAR LENS IMPLANT Right 3/28/2024    Procedure: EXTRACTION, CATARACT, WITH IOL INSERTION;  Surgeon: Hans Woodward MD;  Location: [facility] OR;  Service: Ophthalmology;  Laterality: Right;    CORONARY ANGIOGRAPHY N/A 3/10/2021    Procedure: ANGIOGRAM, CORONARY ARTERY - right radial;  Surgeon: Shemar Dempsey MD;  Location: [facility] CATH LAB;  Service: Cardiology;  Laterality: N/A;    CORONARY STENT PLACEMENT  03/10/2021    prox-mid RCA Marshal 4.5 x 26 mm, 4.5 x 12 mm    FIXATION KYPHOPLASTY N/A 4/22/2024    Procedure: KYPHOPLASTY;  Surgeon: Stefan Stern MD;  Location: [facility] OR;  Service: Pain Management;  Laterality: N/A;  NO PROPOFOL    INCISION AND DRAINAGE OF SCROTUM N/A 11/15/2022    Procedure: INCISION AND DRAINAGE, SCROTUM;  Surgeon: William Hatch MD;  Location: [facility] OR;  Service: Urology;  Laterality: N/A;    INCISION AND DRAINAGE OF SCROTUM N/A 11/17/2022    Procedure: INCISION AND DRAINAGE, SCROTUM;  Surgeon: William Hatch MD;  Location: [facility];  Service: Urology;  Laterality: N/A;    LUNG LOBECTOMY Right 2008    RUL lobectomy after removal of atrial myxoma    PLEURA BIOPSY      RESECTION OF ATRIAL MYXOMA  2007       Review of patient's allergies indicates:   Allergen Reactions    Enbrel [etanercept] Shortness Of Breath     CHF    Nsaids (non-steroidal anti-inflammatory drug) Other (See Comments)     hypertention  Other reaction(s): Other (See Comments)  hypertention  HTN  Patient states he tolerates anti inflammatory eye drops    Statins-hmg-coa reductase inhibitors Other (See Comments)     Heart arrythemias  Other reaction(s): Other (See  "Comments)  Heart arrythemias  Joint pain and cardiac arrythmias    Pcn [penicillins] Rash       Current Facility-Administered Medications   Medication Dose Route Frequency Provider Last Rate Last Admin    HYDROmorphone injection 1 mg  1 mg Intravenous Q4H PRN Victorino Arreaga MD        oxyCODONE immediate release tablet 10 mg  10 mg Oral Q4H PRN Victorino Arreaga MD        sodium chloride 0.9% flush 10 mL  10 mL Intravenous PRN Hans Woodward MD         Current Outpatient Medications   Medication Sig Dispense Refill    aspirin 81 MG Chew Take 81 mg by mouth.      BD ULTRA-FINE RAHUL PEN NEEDLE 32 gauge x 5/32" Ndle To use 6 daily 400 each 3    carvediloL (COREG) 6.25 MG tablet Take 1 tablet (6.25 mg total) by mouth 2 (two) times daily with meals. 180 tablet 3    ciprofloxacin HCl (CIPRO) 500 MG tablet Take 1 tablet (500 mg total) by mouth 2 (two) times daily. for 7 days 14 tablet 0    digoxin (LANOXIN) 125 mcg tablet TAKE 1 TABLET BY MOUTH ONCE DAILY. 90 tablet 3    diltiaZEM (CARDIZEM CD) 120 MG Cp24 Take 120 mg by mouth Daily.      ergocalciferol (ERGOCALCIFEROL) 50,000 unit Cap Take 1 capsule (50,000 Units total) by mouth every 7 days. 12 capsule 3    EScitalopram oxalate (LEXAPRO) 20 MG tablet Take 20 mg by mouth every evening.      folic acid (FOLVITE) 1 MG tablet Take 1 tablet (1 mg total) by mouth once daily. 30 tablet 5    furosemide (LASIX) 80 MG tablet Take 1 tablet (80 mg total) by mouth 2 (two) times a day. 180 tablet 3    gabapentin (NEURONTIN) 300 MG capsule 2 (two) times daily.      HYDROmorphone (DILAUDID) 4 MG tablet TAKE 1½ Tablets by mouth EVERY 6 HOURS as needed for SEVERE PAIN 60 tablet 0    HYDROmorphone (DILAUDID) 4 MG tablet TAKE ONE TABLET BY MOUTH EVERY 6 HOURS AS NEEDED FOR SEVERE PAIN 4 tablet 0    HYDROmorphone (DILAUDID) 4 MG tablet TAKE ONE TAB PO Q 6 HOURS PRN SEVERE PAIN 4 tablet 0    HYDROmorphone (DILAUDID) 4 MG tablet TAKE ONE TABLET BY MOUTH EVERY 6 HOURS AS NEEDED FOR " SEVERE PAIN 12 tablet 0    insulin aspart, niacinamide, (FIASP PENFILL U-100 INSULIN) 100 unit/mL (3 mL) Crtg pen To use with inPen; 140 max daily dose. 14 pen 11    insulin degludec (TRESIBA FLEXTOUCH U-200) 200 unit/mL (3 mL) insulin pen Inject 60 Units into the skin once daily. Cartridge for novoecho (Patient taking differently: Inject 40 Units into the skin once daily. Cartridge for novoecho) 3 pen 11    ketoconazole (NIZORAL) 2 % cream Apply to affected area DAILY 30 g 3    lamiVUDine (EPIVIR) 150 MG Tab TAKE 1 TABLET BY MOUTH EVERY DAY 30 tablet 23    metoprolol tartrate (LOPRESSOR) 50 MG tablet Take 50 mg by mouth every 12 (twelve) hours.      metroNIDAZOLE (FLAGYL) 500 MG tablet Take 1 tablet (500 mg total) by mouth every 12 (twelve) hours. for 7 days 14 tablet 0    moxifloxacin (VIGAMOX) 0.5 % ophthalmic solution Place 1 drop into the right eye 3 (three) times daily. 3 mL 3    multivitamin (THERAGRAN) per tablet Take 1 tablet by mouth once daily.      mupirocin (BACTROBAN) 2 % ointment Apply to affected area 3 times daily 22 g 1    nitroGLYCERIN (NITROSTAT) 0.4 MG SL tablet PLACE 1 TABLET UNDER THE TONGUE EVERY 5 MINUTES AS NEEDED FOR CHEST PAIN. 100 tablet 2    nystatin (MYCOSTATIN) powder Apply topically 4 (four) times daily. Apply to intertriginous areas as directed up to four times daily to reduce moisture. for 14 days 30 g 0    olmesartan (BENICAR) 40 MG tablet TAKE 1 TABLET BY MOUTH EVERY DAY AT NIGHT 90 tablet 3    oxyCODONE-acetaminophen (PERCOCET)  mg per tablet Take 1 tablet by mouth every 6 (six) hours as needed for Pain. 12 tablet 0    oxyCODONE-acetaminophen (PERCOCET) 5-325 mg per tablet Take 1 tablet by mouth every 6 (six) hours as needed for Pain. 12 tablet 0    prednisoLONE acetate (PRED FORTE) 1 % DrpS Place 1 drop into the right eye 3 (three) times daily. 10 mL 3    predniSONE (DELTASONE) 2.5 MG tablet TAKE 5 TABLETS (12.5 MG TOTAL) BY MOUTH ONCE DAILY. 150 tablet 2    predniSONE  (DELTASONE) 5 MG tablet TAKE 2 TABLETS BY MOUTH ONCE DAILY. 60 tablet 3    PROAIR HFA 90 mcg/actuation inhaler       tirzepatide 7.5 mg/0.5 mL PnIj Inject 7.5 mg into the skin every 7 days. (Patient taking differently: Inject 7.5 mg into the skin every 7 days. THURSDAYS, LAST DOSE 4/11/24) 4 Pen 11    tiZANidine (ZANAFLEX) 4 MG tablet TAKE 1 TABLET BY MOUTH EVERY 8 HOURS AS NEEDED FOR PAIN 30 tablet 2    tocilizumab 162 mg/0.9 mL PnIj Inject 162 mg into the skin once a week. 4 mL 0    tocilizumab 162 mg/0.9 mL PnIj Inject 162 mg into the skin once a week. 4 mL 6    walker Misc 1 Units by Misc.(Non-Drug; Combo Route) route daily as needed (Rollator Walker). 1 each 0     Family History       Problem Relation (Age of Onset)    Cancer Sister    Diabetes type I Father    Diabetes type II Mother    Hodgkin's lymphoma Mother    Kidney failure Father          Tobacco Use    Smoking status: Every Day     Current packs/day: 1.00     Average packs/day: 1 pack/day for 35.0 years (35.0 ttl pk-yrs)     Types: Cigarettes    Smokeless tobacco: Never   Substance and Sexual Activity    Alcohol use: Not Currently    Drug use: No    Sexual activity: Never     Review of Systems   Constitutional:  Positive for fatigue. Negative for chills and fever.   HENT:  Negative for rhinorrhea and sore throat.    Respiratory:  Negative for cough, shortness of breath and wheezing.    Cardiovascular:  Negative for chest pain, palpitations and leg swelling.   Gastrointestinal:  Positive for abdominal pain and diarrhea. Negative for nausea and vomiting.   Genitourinary:  Negative for dysuria and flank pain.   Musculoskeletal:  Positive for back pain.   Skin:  Negative for rash and wound.   Neurological:  Positive for numbness. Negative for weakness.   Psychiatric/Behavioral:  Negative for agitation, behavioral problems and confusion.      Objective:     Vital Signs (Most Recent):  Temp: 98.6 °F (37 °C) (04/26/24 1525)  Pulse: 99 (04/26/24 1947)  Resp:  16 (04/26/24 2033)  BP: (!) 140/73 (04/26/24 1947)  SpO2: 95 % (04/26/24 1947) Vital Signs (24h Range):  Temp:  [98.2 °F (36.8 °C)-98.6 °F (37 °C)] 98.6 °F (37 °C)  Pulse:  [] 99  Resp:  [16-22] 16  SpO2:  [95 %-98 %] 95 %  BP: (107-140)/(66-73) 140/73     Weight: 91.3 kg (201 lb 4.5 oz)  Body mass index is 28.07 kg/m².     Physical Exam  Constitutional:       General: He is not in acute distress.     Appearance: Normal appearance. He is not toxic-appearing.   HENT:      Head: Normocephalic and atraumatic.      Nose: Nose normal.      Mouth/Throat:      Mouth: Mucous membranes are moist.      Pharynx: Oropharynx is clear.   Eyes:      General: No scleral icterus.     Conjunctiva/sclera: Conjunctivae normal.   Cardiovascular:      Rate and Rhythm: Normal rate.   Pulmonary:      Effort: Pulmonary effort is normal. No respiratory distress.      Breath sounds: No wheezing.   Abdominal:      General: There is no distension.      Palpations: Abdomen is soft.      Tenderness: There is no abdominal tenderness.   Musculoskeletal:         General: Tenderness (lower back) present.      Cervical back: Normal range of motion.      Right lower leg: No edema.      Left lower leg: No edema.   Skin:     General: Skin is warm and dry.      Findings: No rash.   Neurological:      Mental Status: He is alert and oriented to person, place, and time.      Motor: No weakness.   Psychiatric:         Mood and Affect: Mood normal.         Behavior: Behavior normal.         Thought Content: Thought content normal.                Significant Labs: CBC:   Recent Labs   Lab 04/25/24  1130 04/26/24  1709 04/26/24  1718   WBC 7.58 9.02  --    HGB 15.8 16.0  --    HCT 47.3 47.5 52    288  --      CMP:   Recent Labs   Lab 04/25/24  1130 04/26/24  1709    137   K 3.9 3.8    106   CO2 22* 16*   * 148*   BUN 11 9   CREATININE 0.8 0.8   CALCIUM 9.4 9.0   PROT  --  6.4   ALBUMIN  --  2.8*   BILITOT  --  0.4   ALKPHOS  --   138*   AST  --  13   ALT  --  10   ANIONGAP 13 15       Significant Imaging: I have reviewed all pertinent imaging results/findings within the past 24 hours.  Assessment/Plan:     * Intractable pain  Due to new L1 fracture     Hydromorphone 1 mg IV q4h prn for severe pain  Oxycodone 10 mg q4h prn for moderate pain   Spine surgery consulted, recommended TLSO brace and further imaging       Diarrhea of presumed infectious origin  Recent hospital visits/surgery place patient at risk for C diff infection, C diff testing sent   Continue supportive care for now   Was prescribed flagyl/cipro yesterday, will hold off on starting antibiotics for now       Lumbar vertebral fracture  L1 new fracture     TLSO brace   MRI pending       Rheumatoid arthritis involving multiple sites with positive rheumatoid factor  Hx of RA on tocilizumab, last in march 2024, supposed to be on lamivudine for hep B ppx    Coronary artery disease  Hx of CAD  Hold ASA for now     Type 2 diabetes mellitus with hyperglycemia, with long-term current use of insulin  Hasn't been taking insulin  Will hold off on scheduled insulin  Monitor glucose AC/HS and utilize prn insulin       Systolic heart failure  Hasn't been on lasix   Currently hypovolemic due to diarrhea, s/p 1 L of IVF in the ED  Will encourage PO hydration and utilize IV fluids if needed       Hepatitis B core antibody positive  Patient is supposed to be on ppx with lamivudine but hasn't been taking it for few weeks  Will hold off resuming it for now, needs to be resumed at discharge         Hx of SVT on digoxin, coreg but hasn't been taking coreg    Continue digoxin for now   Will give IV magnesium 2gm  Optimize potassium > 4    VTE Risk Mitigation (From admission, onward)      Lovenox 40 mg Q24 hours                  Victorino Arreaga MD  [facility]

## 2024-04-27 NOTE — PLAN OF CARE
[facility] (Hospital)  Initial Discharge Assessment       Primary Care Provider: Tushar Drew MD    Admission Diagnosis: Lumbar vertebral fracture [S32.009A]  Intractable pain [R52]  Closed fracture of first lumbar vertebra, unspecified fracture morphology, initial encounter [S32.019A]    Admission Date: 4/26/2024  Expected Discharge Date:     Transition of Care Barriers: None    Payor: HUMANA MANAGED MEDICARE / Plan: HUMANA MEDICARE HMO / Product Type: Capitation /     Extended Emergency Contact Information  Primary Emergency Contact: Anjel Waters  Mobile Phone: [phone]  Relation: Relative       Discharge Plan B: Home with family      [id number] - [place], [place] - [address]  [address] [facility] [address]  Phone: [phone] Fax: [phone]    [facility] [place]  [address]  [facility] [address]  Phone: [phone] Fax: [phone]    [facility]  [address]  [facility] [address]  Phone: [phone] Fax: [phone]      Initial Assessment (most recent)       Adult Discharge Assessment - 04/27/24 1518          Discharge Assessment    Assessment Type Discharge Planning Assessment     Confirmed/corrected address, phone number and insurance Yes     Confirmed Demographics Correct on Facesheet     Source of Information patient;family     Does patient/caregiver understand observation status Yes     Communicated ALEXANDER with patient/caregiver Yes     Reason For Admission Intractable pain     People in Home other relative(s)     Do you expect to return to your current living situation? Yes     Do you have help at home or someone to help you manage your care at home? Yes     Prior to hospitilization cognitive status: Alert/Oriented     Current cognitive status: Alert/Oriented     Equipment Currently Used at Home rollator     Readmission within 30 days? No     Patient currently being followed by outpatient case management? No      Do you currently have service(s) that help you manage your care at home? No     Do you take prescription medications? Yes     Do you have prescription coverage? Yes     Coverage Payor: HUMANA MANAGED MEDICARE - HUMANA MEDICARE HMO - CAPITATED     Do you have any problems affording any of your prescribed medications? No     Is the patient taking medications as prescribed? yes     How do you get to doctors appointments? car, drives self;family or friend will provide     Are you on dialysis? No     Do you take coumadin? No     Discharge Plan B Home with family     DME Needed Upon Discharge  none     Discharge Plan discussed with: Patient     Transition of Care Barriers None        Physical Activity    On average, how many days per week do you engage in moderate to strenuous exercise (like a brisk walk)? 0 days     On average, how many minutes do you engage in exercise at this level? 0 min        Financial Resource Strain    How hard is it for you to pay for the very basics like food, housing, medical care, and heating? Not very hard        Housing Stability    In the last 12 months, was there a time when you were not able to pay the mortgage or rent on time? No     In the past 12 months, how many times have you moved where you were living? 1     At any time in the past 12 months, were you homeless or living in a shelter (including now)? No        Transportation Needs    In the past 12 months, has lack of transportation kept you from medical appointments or from getting medications? No     In the past 12 months, has lack of transportation kept you from meetings, work, or from getting things needed for daily living? No        Food Insecurity    Within the past 12 months, you worried that your food would run out before you got the money to buy more. Never true     Within the past 12 months, the food you bought just didn't last and you didn't have money to get more. Never true        Stress    Do you feel stress -  tense, restless, nervous, or anxious, or unable to sleep at night because your mind is troubled all the time - these days? To some extent        Social Connections    In a typical week, how many times do you talk on the phone with family, friends, or neighbors? More than three times a week     How often do you get together with friends or relatives? Once a week     How often do you attend Buddhism or Yarsanism services? Never     Do you belong to any clubs or organizations such as Buddhism groups, unions, fraternal or athletic groups, or school groups? No     How often do you attend meetings of the clubs or organizations you belong to? Never     Are you , , , , never , or living with a partner?         Alcohol Use    Q1: How often do you have a drink containing alcohol? Never     Q2: How many drinks containing alcohol do you have on a typical day when you are drinking? Patient does not drink     Q3: How often do you have six or more drinks on one occasion? Never                      Spoke to pt. Pt lives at home with his nephew Anjel. Post hospital  stay nephew will be pt support person and pt. has transportation at d/c with his nephew. There have been no hospitalizations within the last 30 days per pt. Verified pt PCP and preferred pharmacy. Pt stated not on Coumadin and is not receiving dialysis. All questions answered regarding case management/ discharge planning , pt verbalized understanding. Discharge booklet with SW contact information given to pt.     Discharge Plan A and Plan B have been determined by review of patient's clinical status, future medical and therapeutic needs, and coverage/benefits for post-acute care in coordination with multidisciplinary team members.     AKIRA Melvin  [facility]  [phone]

## 2024-04-27 NOTE — ASSESSMENT & PLAN NOTE
Due to new L1 fracture     Hydromorphone 1 mg IV q4h prn for severe pain  Oxycodone 10 mg q4h prn for moderate pain   Spine surgery consulted, recommended TLSO brace and further imaging

## 2024-04-27 NOTE — ASSESSMENT & PLAN NOTE
Hasn't been taking insulin  Will hold off on scheduled insulin  Monitor glucose AC/HS and utilize prn insulin

## 2024-04-27 NOTE — NURSING
Patient Transferred from ED to NPU Room 930 around 2235 via stretcher. Pt moved from bed to stretcher with minimal assist.    Upon transfer to floor, patient greeted and assessed. Vitals taken. Neuro assessment completed; pt AAOx4; CALHOUN ambulates with a cane; c/o 10/10 back pain requesting pain meds. [facility] med N oncall provider aware of pt to room with orders noted and reviewed.  Emergency Equipment present in room. Fall Precautions Initiated. Patient on special contact for c-diff with pending specimen collection.   Patient acclimated to room. 4 Eyes skin assessment performed. See Below:    +++++ Special Considerations+++++++  4 Eyes  4/26/2024  2241    Skin assessed during: Admit      [] No Altered Skin Integrity Present    []Prevention Measures Documented      [x] Yes- Altered Skin Integrity Present or Discovered   [x] LDA Added if Not in Epic               -mid back area with healing surgical incision observed.    [] New Altered Skin Integrity was Present on Admit and Documented in LDA   [] Wound Image Taken    Wound Care Consulted? No    Attending Nurse:  Angeles Owens RN/Staff Member:  Vilma

## 2024-04-27 NOTE — ED NOTES
Nurses Note -- 4 Eyes  4/26/2024   9:06 PM  Skin assessed during: Q Shift Change      [] No Altered Skin Integrity Present    []Prevention Measures Documented      [x] Yes- Altered Skin Integrity Present or Discovered   [] LDA Added if Not in Epic (Describe Wound)   [] New Altered Skin Integrity was Present on Admit and Documented in LDA   [x] Wound Image Taken    Wound Care Consulted? No    Attending Nurse:  Glendy Owens RN/Staff Member:   john

## 2024-04-27 NOTE — HPI
Mr. Wagner is a 68M s/p T11 and L3 kyphoplasty with pain management Dr. Aviles on 4/22/2024, now with worsening back pain and diarrhea. Has had multiple ED visits in the past week, CT A/P and MRI show new acute compression fx of L1 with mild retropulsion. Patient denies any recent trauma or falls. Has severe axial low back pain, no radicular pain. Able to walk with cane, but has severe pain with any movements, is most comfortable lying still in bed. No weakness, numbness, or bladder sx. Has diarrhea, on c diff rule out. Does not yet have TLSO brace.   
Pt is a 68yoM s/p s/p T11 and L3 kyphoplasty on 4/22/2024, now with worsening back pain and diarrhea. Has had multiple ED visits in the past week, CT A/P done yesterday showed Interval compression deformity involving the superior endplate of L1 as well as enteritis, he was discharged home on cipro/flagyl but never started taking them. Was seen by pain management and recommended admission for intractable pain.     in the ED, vital signs are stable, cbc/cmp unremarkable, ESR/CRP down trending.  patient has no red flags for infection/spinal involvement beyond pain and has reassuring neurological examination.  Patient has  required 2 mg of IV hydromorphone.      At bedside, complained of low back pain, denies worsening numbness in his legs, no fecal/urinary incontinence, no fevers, no night sweats. Complained of watery diarrhea for few days, he states that few of his friends have contracted a diarrheal illness as well. Diarrhea is associated with abdominal pain, no nausea/no vomiting. Hasn't been able to eat well and hasn't taking most of his medications in the past few days.   
unknown

## 2024-04-28 LAB
ALBUMIN SERPL BCP-MCNC: 2.4 G/DL (ref 3.5–5.2)
ALP SERPL-CCNC: 123 U/L (ref 55–135)
ALT SERPL W/O P-5'-P-CCNC: 8 U/L (ref 10–44)
ANION GAP SERPL CALC-SCNC: 12 MMOL/L (ref 8–16)
AST SERPL-CCNC: 12 U/L (ref 10–40)
BASOPHILS # BLD AUTO: 0.06 K/UL (ref 0–0.2)
BASOPHILS NFR BLD: 0.7 % (ref 0–1.9)
BILIRUB SERPL-MCNC: 0.3 MG/DL (ref 0.1–1)
BUN SERPL-MCNC: 9 MG/DL (ref 8–23)
CALCIUM SERPL-MCNC: 8.6 MG/DL (ref 8.7–10.5)
CHLORIDE SERPL-SCNC: 106 MMOL/L (ref 95–110)
CO2 SERPL-SCNC: 18 MMOL/L (ref 23–29)
CREAT SERPL-MCNC: 0.7 MG/DL (ref 0.5–1.4)
DIFFERENTIAL METHOD BLD: ABNORMAL
EOSINOPHIL # BLD AUTO: 0.1 K/UL (ref 0–0.5)
EOSINOPHIL NFR BLD: 1.3 % (ref 0–8)
ERYTHROCYTE [DISTWIDTH] IN BLOOD BY AUTOMATED COUNT: 12.1 % (ref 11.5–14.5)
EST. GFR  (NO RACE VARIABLE): >60 ML/MIN/1.73 M^2
GLUCOSE SERPL-MCNC: 140 MG/DL (ref 70–110)
HCT VFR BLD AUTO: 48 % (ref 40–54)
HGB BLD-MCNC: 16 G/DL (ref 14–18)
IMM GRANULOCYTES # BLD AUTO: 0.04 K/UL (ref 0–0.04)
IMM GRANULOCYTES NFR BLD AUTO: 0.4 % (ref 0–0.5)
LYMPHOCYTES # BLD AUTO: 1.7 K/UL (ref 1–4.8)
LYMPHOCYTES NFR BLD: 19.2 % (ref 18–48)
MAGNESIUM SERPL-MCNC: 1.6 MG/DL (ref 1.6–2.6)
MCH RBC QN AUTO: 33.7 PG (ref 27–31)
MCHC RBC AUTO-ENTMCNC: 33.3 G/DL (ref 32–36)
MCV RBC AUTO: 101 FL (ref 82–98)
MONOCYTES # BLD AUTO: 0.9 K/UL (ref 0.3–1)
MONOCYTES NFR BLD: 10.3 % (ref 4–15)
NEUTROPHILS # BLD AUTO: 6.2 K/UL (ref 1.8–7.7)
NEUTROPHILS NFR BLD: 68.1 % (ref 38–73)
NRBC BLD-RTO: 0 /100 WBC
PLATELET # BLD AUTO: 272 K/UL (ref 150–450)
PMV BLD AUTO: 11.5 FL (ref 9.2–12.9)
POCT GLUCOSE: 131 MG/DL (ref 70–110)
POCT GLUCOSE: 143 MG/DL (ref 70–110)
POCT GLUCOSE: 151 MG/DL (ref 70–110)
POTASSIUM SERPL-SCNC: 3.9 MMOL/L (ref 3.5–5.1)
PROT SERPL-MCNC: 5.7 G/DL (ref 6–8.4)
RBC # BLD AUTO: 4.75 M/UL (ref 4.6–6.2)
SODIUM SERPL-SCNC: 136 MMOL/L (ref 136–145)
WBC # BLD AUTO: 9.07 K/UL (ref 3.9–12.7)

## 2024-04-28 PROCEDURE — 85025 COMPLETE CBC W/AUTO DIFF WBC: CPT | Performed by: INTERNAL MEDICINE

## 2024-04-28 PROCEDURE — 11000001 HC ACUTE MED/SURG PRIVATE ROOM

## 2024-04-28 PROCEDURE — 25000003 PHARM REV CODE 250: Performed by: INTERNAL MEDICINE

## 2024-04-28 PROCEDURE — 63600175 PHARM REV CODE 636 W HCPCS: Performed by: HOSPITALIST

## 2024-04-28 PROCEDURE — 25000003 PHARM REV CODE 250: Performed by: HOSPITALIST

## 2024-04-28 PROCEDURE — 63600175 PHARM REV CODE 636 W HCPCS: Performed by: INTERNAL MEDICINE

## 2024-04-28 PROCEDURE — 36415 COLL VENOUS BLD VENIPUNCTURE: CPT | Performed by: INTERNAL MEDICINE

## 2024-04-28 PROCEDURE — 80053 COMPREHEN METABOLIC PANEL: CPT | Performed by: INTERNAL MEDICINE

## 2024-04-28 PROCEDURE — 99233 SBSQ HOSP IP/OBS HIGH 50: CPT | Mod: ,,, | Performed by: NEUROLOGICAL SURGERY

## 2024-04-28 PROCEDURE — 83735 ASSAY OF MAGNESIUM: CPT | Performed by: INTERNAL MEDICINE

## 2024-04-28 RX ADMIN — OXYCODONE HYDROCHLORIDE 10 MG: 10 TABLET ORAL at 12:04

## 2024-04-28 RX ADMIN — OXYCODONE HYDROCHLORIDE 10 MG: 10 TABLET ORAL at 08:04

## 2024-04-28 RX ADMIN — OXYCODONE HYDROCHLORIDE 10 MG: 10 TABLET ORAL at 04:04

## 2024-04-28 RX ADMIN — GABAPENTIN 300 MG: 300 CAPSULE ORAL at 09:04

## 2024-04-28 RX ADMIN — ENOXAPARIN SODIUM 40 MG: 40 INJECTION SUBCUTANEOUS at 04:04

## 2024-04-28 RX ADMIN — HYDROMORPHONE HYDROCHLORIDE 1 MG: 1 INJECTION, SOLUTION INTRAMUSCULAR; INTRAVENOUS; SUBCUTANEOUS at 06:04

## 2024-04-28 RX ADMIN — DIGOXIN 0.12 MG: 125 TABLET ORAL at 09:04

## 2024-04-28 RX ADMIN — HYDROMORPHONE HYDROCHLORIDE 1 MG: 1 INJECTION, SOLUTION INTRAMUSCULAR; INTRAVENOUS; SUBCUTANEOUS at 05:04

## 2024-04-28 RX ADMIN — OXYCODONE HYDROCHLORIDE 10 MG: 10 TABLET ORAL at 09:04

## 2024-04-28 RX ADMIN — GABAPENTIN 300 MG: 300 CAPSULE ORAL at 08:04

## 2024-04-28 RX ADMIN — LOPERAMIDE HYDROCHLORIDE 2 MG: 2 CAPSULE ORAL at 09:04

## 2024-04-28 RX ADMIN — HYDROMORPHONE HYDROCHLORIDE 1 MG: 1 INJECTION, SOLUTION INTRAMUSCULAR; INTRAVENOUS; SUBCUTANEOUS at 02:04

## 2024-04-28 RX ADMIN — HYDROMORPHONE HYDROCHLORIDE 1 MG: 1 INJECTION, SOLUTION INTRAMUSCULAR; INTRAVENOUS; SUBCUTANEOUS at 01:04

## 2024-04-28 RX ADMIN — HYDROMORPHONE HYDROCHLORIDE 1 MG: 1 INJECTION, SOLUTION INTRAMUSCULAR; INTRAVENOUS; SUBCUTANEOUS at 10:04

## 2024-04-28 RX ADMIN — ESCITALOPRAM OXALATE 20 MG: 20 TABLET, FILM COATED ORAL at 08:04

## 2024-04-28 RX ADMIN — Medication 1 CAPSULE: at 09:04

## 2024-04-28 NOTE — SUBJECTIVE & OBJECTIVE
Interval History:  C diff was negative, started on imodium. Pt reports improvement in diarrhea; 1 loose nonbloody bm since 4 am, not as watery anymore.  No chest pain or shortness a breath reported.    Review of Systems  Objective:     Vital Signs (Most Recent):  Temp: 98.3 °F (36.8 °C) (04/28/24 1129)  Pulse: 97 (04/28/24 1129)  Resp: 20 (04/28/24 1129)  BP: (!) 142/78 (04/28/24 1129)  SpO2: 95 % (04/28/24 1129) Vital Signs (24h Range):  Temp:  [97.8 °F (36.6 °C)-98.3 °F (36.8 °C)] 98.3 °F (36.8 °C)  Pulse:  [] 97  Resp:  [16-20] 20  SpO2:  [93 %-96 %] 95 %  BP: (128-147)/(69-84) 142/78     Weight: 91.3 kg (201 lb 4.5 oz)  Body mass index is 28.07 kg/m².    Intake/Output Summary (Last 24 hours) at 4/28/2024 1132  Last data filed at 4/28/2024 0032  Gross per 24 hour   Intake 810 ml   Output 350 ml   Net 460 ml         Physical Exam      Clear lungs bilaterally, unlabored breathing, on room air, no cyanosis  Heart sounds indicate a regular rate and rhythm  Awake alert, no acute distress   No facial droop, no slurred speech  5/5 fist , 5/5 hip flexor strength bilaterally   Abdomen is soft nontender, nondistended   Spine examined, no lesions noted   No obvious lower extremity edema

## 2024-04-28 NOTE — PROGRESS NOTES
[facility] - Neurosurgery ([facility])  [facility] Medicine  Progress Note    Patient Name: Nithin Wagner  MRN: [id number]  Patient Class: OP- Observation   Admission Date: 4/26/2024  Length of Stay: 0 days  Attending Physician: Jose Hagen MD  Primary Care Provider: Tushar Drew MD        Subjective:     Principal Problem:Intractable pain        HPI:  Pt is a 68yoM s/p s/p T11 and L3 kyphoplasty on 4/22/2024, now with worsening back pain and diarrhea. Has had multiple ED visits in the past week, CT A/P done yesterday showed Interval compression deformity involving the superior endplate of L1 as well as enteritis, he was discharged home on cipro/flagyl but never started taking them. Was seen by pain management and recommended admission for intractable pain.     in the ED, vital signs are stable, cbc/cmp unremarkable, ESR/CRP down trending.  patient has no red flags for infection/spinal involvement beyond pain and has reassuring neurological examination.  Patient has  required 2 mg of IV hydromorphone.      At bedside, complained of low back pain, denies worsening numbness in his legs, no fecal/urinary incontinence, no fevers, no night sweats. Complained of watery diarrhea for few days, he states that few of his friends have contracted a diarrheal illness as well. Diarrhea is associated with abdominal pain, no nausea/no vomiting. Hasn't been able to eat well and hasn't taking most of his medications in the past few days.     Overview/Hospital Course:  Per NSGY L-spine MRI (partially completed) read showed   acute L1 compression fx with mild retropulsion. No severe stenosis. post kyphopasty at T11, L3. C diff was negative, started on imodium. Pt reports improvement in diarrhea; 1 loose nonbloody bm since 4 am, not as watery anymore.     Interval History:  C diff was negative, started on imodium. Pt reports improvement in diarrhea; 1 loose nonbloody bm since 4 am, not as watery anymore.  No chest pain or shortness  a breath reported.    Review of Systems  Objective:     Vital Signs (Most Recent):  Temp: 98.3 °F (36.8 °C) (04/28/24 1129)  Pulse: 97 (04/28/24 1129)  Resp: 20 (04/28/24 1129)  BP: (!) 142/78 (04/28/24 1129)  SpO2: 95 % (04/28/24 1129) Vital Signs (24h Range):  Temp:  [97.8 °F (36.6 °C)-98.3 °F (36.8 °C)] 98.3 °F (36.8 °C)  Pulse:  [] 97  Resp:  [16-20] 20  SpO2:  [93 %-96 %] 95 %  BP: (128-147)/(69-84) 142/78     Weight: 91.3 kg (201 lb 4.5 oz)  Body mass index is 28.07 kg/m².    Intake/Output Summary (Last 24 hours) at 4/28/2024 1132  Last data filed at 4/28/2024 0032  Gross per 24 hour   Intake 810 ml   Output 350 ml   Net 460 ml         Physical Exam      Clear lungs bilaterally, unlabored breathing, on room air, no cyanosis  Heart sounds indicate a regular rate and rhythm  Awake alert, no acute distress   No facial droop, no slurred speech  5/5 fist , 5/5 hip flexor strength bilaterally   Abdomen is soft nontender, nondistended   Spine examined, no lesions noted   No obvious lower extremity edema       Assessment/Plan:      * Intractable pain  Due to new L1 fracture     Hydromorphone 1 mg IV q4h prn for severe pain  Oxycodone 10 mg q4h prn for moderate pain   Per spine surgery  MRI L sp 4/27 (partially completed): acute L1 compression fx with mild retropulsion. No severe stenosis. post kyphopasty at T11, L3   Xrays ordered per NSGY to assess for stability; TSLO brace  Repeat MRI w/ contrast w/ sedation ordered to evaluate for pathologic etiology before considering kyphoplasty        Lumbar vertebral fracture  See above      Diarrhea of presumed infectious origin  C diff testing negative  Afebrile; no abx needed at this time  CT showing   Liquid stool throughout the large bowel and within several small bowel loops, taken together, suggests diarrheal illness possibly in the setting of enteritis. And Left perinephric fat stranding noting somewhat delayed excretion of contrast by the left kidney.      Imodium prn  Start probiotic  CRP is downtrending      Rheumatoid arthritis involving multiple sites with positive rheumatoid factor  Hx of RA on tocilizumab, last in march 2024, supposed to be on lamivudine for hep B ppx    Supraventricular tachycardia  Hx of SVT on digoxin, coreg but hasn't been taking coreg    Continue digoxin for now   Will give IV magnesium 2gm  Optimize potassium > 4    Coronary artery disease  Hx of CAD  Hold ASA for now     Type 2 diabetes mellitus with hyperglycemia, with long-term current use of insulin  Hasn't been taking insulin  Will hold off on scheduled insulin  Monitor glucose AC/HS and utilize prn insulin       Systolic heart failure  Hasn't been on lasix   Currently hypovolemic due to diarrhea, s/p 1 L of IVF in the ED  Will encourage PO hydration and utilize IV fluids if needed       Hepatitis B core antibody positive  Patient is supposed to be on ppx with lamivudine but hasn't been taking it for few weeks  Will hold off resuming it for now, needs to be resumed at discharge         VTE Risk Mitigation (From admission, onward)           Ordered     enoxaparin injection 40 mg  Daily         04/27/24 0133     IP VTE HIGH RISK PATIENT  Once         04/27/24 0133     Place sequential compression device  Until discontinued         04/27/24 0133                    Discharge Planning   ALEXANDER: 4/29/2024     Code Status: Full Code   Is the patient medically ready for discharge?:     Reason for patient still in hospital (select all that apply): Patient trending condition, Laboratory test, Treatment, Imaging, and Consult recommendations                     Jose Hagen MD  Department of Hospital Medicine   [facility] Neurosurgery ([facility])

## 2024-04-28 NOTE — PLAN OF CARE
Problem: Adult Inpatient Plan of Care  Goal: Plan of Care Review  Outcome: Progressing  Goal: Patient-Specific Goal (Individualized)  Outcome: Progressing     Problem: Wound  Goal: Optimal Coping  Outcome: Progressing  Goal: Skin Health and Integrity  Outcome: Progressing  Goal: Optimal Wound Healing  Outcome: Progressing

## 2024-04-28 NOTE — PLAN OF CARE
Problem: Adult Inpatient Plan of Care  Goal: Plan of Care Review  Outcome: Progressing  Goal: Patient-Specific Goal (Individualized)  Outcome: Progressing  Goal: Absence of Hospital-Acquired Illness or Injury  Outcome: Progressing  Goal: Optimal Comfort and Wellbeing  Outcome: Progressing  Goal: Readiness for Transition of Care  Outcome: Progressing     Problem: Diabetes Comorbidity  Goal: Blood Glucose Level Within Targeted Range  Outcome: Progressing     Problem: Infection  Goal: Absence of Infection Signs and Symptoms  Outcome: Progressing     Problem: Wound  Goal: Optimal Coping  Outcome: Progressing  Goal: Optimal Functional Ability  Outcome: Progressing  Goal: Absence of Infection Signs and Symptoms  Outcome: Progressing  Goal: Improved Oral Intake  Outcome: Progressing  Goal: Optimal Pain Control and Function  Outcome: Progressing  Goal: Skin Health and Integrity  Outcome: Progressing  Goal: Optimal Wound Healing  Outcome: Progressing

## 2024-04-28 NOTE — ASSESSMENT & PLAN NOTE
Due to new L1 fracture     Hydromorphone 1 mg IV q4h prn for severe pain  Oxycodone 10 mg q4h prn for moderate pain   Per spine surgery  MRI L sp 4/27 (partially completed): acute L1 compression fx with mild retropulsion. No severe stenosis. post kyphopasty at T11, L3   Xrays ordered per NSGY to assess for stability; TSLO brace  Repeat MRI w/ contrast w/ sedation ordered to evaluate for pathologic etiology before considering kyphoplasty

## 2024-04-28 NOTE — NURSING
Nurses Note -- 4 Eyes      4/28/2024   0700 AM      Skin assessed during: Q Shift Change      [] No Altered Skin Integrity Present    []Prevention Measures Documented      [x] Yes- Altered Skin Integrity Present or Discovered   [] LDA Added if Not in Epic (Prior surgery)   [] New Altered Skin Integrity was Present on Admit and Documented in LDA   [] Wound Image Taken    Wound Care Consulted? No    Attending Nurse:  BE Linares    Second RN/Staff Member:  BE Reynoso          Patient

## 2024-04-29 ENCOUNTER — TELEPHONE (OUTPATIENT)
Dept: ADMINISTRATIVE | Facility: OTHER | Age: 69
End: 2024-04-29
Payer: MEDICARE

## 2024-04-29 ENCOUNTER — PATIENT MESSAGE (OUTPATIENT)
Dept: ADMINISTRATIVE | Facility: OTHER | Age: 69
End: 2024-04-29
Payer: MEDICARE

## 2024-04-29 LAB
ALBUMIN SERPL BCP-MCNC: 2.2 G/DL (ref 3.5–5.2)
ALP SERPL-CCNC: 109 U/L (ref 55–135)
ALT SERPL W/O P-5'-P-CCNC: 8 U/L (ref 10–44)
ANION GAP SERPL CALC-SCNC: 13 MMOL/L (ref 8–16)
AST SERPL-CCNC: 11 U/L (ref 10–40)
BASOPHILS # BLD AUTO: 0.07 K/UL (ref 0–0.2)
BASOPHILS NFR BLD: 0.7 % (ref 0–1.9)
BILIRUB SERPL-MCNC: 0.4 MG/DL (ref 0.1–1)
BUN SERPL-MCNC: 7 MG/DL (ref 8–23)
CALCIUM SERPL-MCNC: 8.6 MG/DL (ref 8.7–10.5)
CHLORIDE SERPL-SCNC: 104 MMOL/L (ref 95–110)
CO2 SERPL-SCNC: 19 MMOL/L (ref 23–29)
CREAT SERPL-MCNC: 0.7 MG/DL (ref 0.5–1.4)
DIFFERENTIAL METHOD BLD: ABNORMAL
EOSINOPHIL # BLD AUTO: 0.1 K/UL (ref 0–0.5)
EOSINOPHIL NFR BLD: 1.1 % (ref 0–8)
ERYTHROCYTE [DISTWIDTH] IN BLOOD BY AUTOMATED COUNT: 12.1 % (ref 11.5–14.5)
EST. GFR  (NO RACE VARIABLE): >60 ML/MIN/1.73 M^2
GLUCOSE SERPL-MCNC: 143 MG/DL (ref 70–110)
HCT VFR BLD AUTO: 45.4 % (ref 40–54)
HGB BLD-MCNC: 15.4 G/DL (ref 14–18)
IMM GRANULOCYTES # BLD AUTO: 0.07 K/UL (ref 0–0.04)
IMM GRANULOCYTES NFR BLD AUTO: 0.7 % (ref 0–0.5)
LYMPHOCYTES # BLD AUTO: 1.5 K/UL (ref 1–4.8)
LYMPHOCYTES NFR BLD: 15.7 % (ref 18–48)
MAGNESIUM SERPL-MCNC: 1.6 MG/DL (ref 1.6–2.6)
MCH RBC QN AUTO: 33.8 PG (ref 27–31)
MCHC RBC AUTO-ENTMCNC: 33.9 G/DL (ref 32–36)
MCV RBC AUTO: 100 FL (ref 82–98)
MONOCYTES # BLD AUTO: 1.1 K/UL (ref 0.3–1)
MONOCYTES NFR BLD: 11.5 % (ref 4–15)
NEUTROPHILS # BLD AUTO: 6.6 K/UL (ref 1.8–7.7)
NEUTROPHILS NFR BLD: 70.3 % (ref 38–73)
NRBC BLD-RTO: 0 /100 WBC
PLATELET # BLD AUTO: 262 K/UL (ref 150–450)
PMV BLD AUTO: 11.5 FL (ref 9.2–12.9)
POCT GLUCOSE: 138 MG/DL (ref 70–110)
POCT GLUCOSE: 139 MG/DL (ref 70–110)
POCT GLUCOSE: 143 MG/DL (ref 70–110)
POCT GLUCOSE: 151 MG/DL (ref 70–110)
POTASSIUM SERPL-SCNC: 4.2 MMOL/L (ref 3.5–5.1)
PROT SERPL-MCNC: 5.4 G/DL (ref 6–8.4)
RBC # BLD AUTO: 4.56 M/UL (ref 4.6–6.2)
SODIUM SERPL-SCNC: 136 MMOL/L (ref 136–145)
WBC # BLD AUTO: 9.45 K/UL (ref 3.9–12.7)

## 2024-04-29 PROCEDURE — 80053 COMPREHEN METABOLIC PANEL: CPT | Performed by: INTERNAL MEDICINE

## 2024-04-29 PROCEDURE — 63600175 PHARM REV CODE 636 W HCPCS: Performed by: HOSPITALIST

## 2024-04-29 PROCEDURE — 36415 COLL VENOUS BLD VENIPUNCTURE: CPT | Performed by: INTERNAL MEDICINE

## 2024-04-29 PROCEDURE — 11000001 HC ACUTE MED/SURG PRIVATE ROOM

## 2024-04-29 PROCEDURE — 83735 ASSAY OF MAGNESIUM: CPT | Performed by: INTERNAL MEDICINE

## 2024-04-29 PROCEDURE — 63600175 PHARM REV CODE 636 W HCPCS: Performed by: INTERNAL MEDICINE

## 2024-04-29 PROCEDURE — 99233 SBSQ HOSP IP/OBS HIGH 50: CPT | Mod: ,,,

## 2024-04-29 PROCEDURE — 25000003 PHARM REV CODE 250: Performed by: INTERNAL MEDICINE

## 2024-04-29 PROCEDURE — 85025 COMPLETE CBC W/AUTO DIFF WBC: CPT | Performed by: INTERNAL MEDICINE

## 2024-04-29 PROCEDURE — 25000003 PHARM REV CODE 250: Performed by: HOSPITALIST

## 2024-04-29 RX ORDER — HYDROMORPHONE HYDROCHLORIDE 1 MG/ML
1 INJECTION, SOLUTION INTRAMUSCULAR; INTRAVENOUS; SUBCUTANEOUS ONCE AS NEEDED
Status: DISCONTINUED | OUTPATIENT
Start: 2024-04-29 | End: 2024-05-01

## 2024-04-29 RX ORDER — SODIUM CHLORIDE 9 MG/ML
INJECTION, SOLUTION INTRAVENOUS CONTINUOUS
Status: DISCONTINUED | OUTPATIENT
Start: 2024-04-29 | End: 2024-04-30

## 2024-04-29 RX ADMIN — OXYCODONE HYDROCHLORIDE 10 MG: 10 TABLET ORAL at 12:04

## 2024-04-29 RX ADMIN — ESCITALOPRAM OXALATE 20 MG: 20 TABLET, FILM COATED ORAL at 09:04

## 2024-04-29 RX ADMIN — ONDANSETRON 4 MG: 2 INJECTION INTRAMUSCULAR; INTRAVENOUS at 09:04

## 2024-04-29 RX ADMIN — OXYCODONE HYDROCHLORIDE 10 MG: 10 TABLET ORAL at 11:04

## 2024-04-29 RX ADMIN — Medication 1 CAPSULE: at 09:04

## 2024-04-29 RX ADMIN — ENOXAPARIN SODIUM 40 MG: 40 INJECTION SUBCUTANEOUS at 04:04

## 2024-04-29 RX ADMIN — HYDROMORPHONE HYDROCHLORIDE 1 MG: 1 INJECTION, SOLUTION INTRAMUSCULAR; INTRAVENOUS; SUBCUTANEOUS at 03:04

## 2024-04-29 RX ADMIN — GABAPENTIN 300 MG: 300 CAPSULE ORAL at 09:04

## 2024-04-29 RX ADMIN — DIGOXIN 0.12 MG: 125 TABLET ORAL at 09:04

## 2024-04-29 RX ADMIN — OXYCODONE HYDROCHLORIDE 10 MG: 10 TABLET ORAL at 02:04

## 2024-04-29 RX ADMIN — SODIUM CHLORIDE: 9 INJECTION, SOLUTION INTRAVENOUS at 10:04

## 2024-04-29 RX ADMIN — HYDROMORPHONE HYDROCHLORIDE 1 MG: 1 INJECTION, SOLUTION INTRAMUSCULAR; INTRAVENOUS; SUBCUTANEOUS at 09:04

## 2024-04-29 RX ADMIN — OXYCODONE HYDROCHLORIDE 10 MG: 10 TABLET ORAL at 05:04

## 2024-04-29 RX ADMIN — OXYCODONE HYDROCHLORIDE 10 MG: 10 TABLET ORAL at 06:04

## 2024-04-29 RX ADMIN — OXYCODONE HYDROCHLORIDE 10 MG: 10 TABLET ORAL at 10:04

## 2024-04-29 RX ADMIN — HYDROMORPHONE HYDROCHLORIDE 1 MG: 1 INJECTION, SOLUTION INTRAMUSCULAR; INTRAVENOUS; SUBCUTANEOUS at 04:04

## 2024-04-29 RX ADMIN — HYDROMORPHONE HYDROCHLORIDE 1 MG: 1 INJECTION, SOLUTION INTRAMUSCULAR; INTRAVENOUS; SUBCUTANEOUS at 01:04

## 2024-04-29 RX ADMIN — SODIUM CHLORIDE: 9 INJECTION, SOLUTION INTRAVENOUS at 09:04

## 2024-04-29 NOTE — ASSESSMENT & PLAN NOTE
Mr. Wagner is a 68M s/p T11 and L3 kyphoplasty with pain management Dr. Aviles on 4/22/2024, now with worsening back pain and diarrhea. Has had multiple ED visits in the past week, CT A/P and MRI show new acute compression fx of L1 with mild retropulsion. In significant pain but remains neuro intact.    MRI L sp 4/27 (partially completed): acute L1 compression fx with mild retropulsion. No severe stenosis. post kyphopasty at T11, L3     - admitted to medicine for pain control  - recommend TLSO brace, standing thoracolumbar XR in TLSO brace to assess stability  - MRI L sp w/wo with anesthesia scheduled for Monday to assess for possible pathologic fx  - CT chest malignancy workup completed, R upper lobe opacity, f/u read  - consideration of further kyphoplasty at L1, patient not interested at this time  - optimization of bone quality per primary team and endocrine

## 2024-04-29 NOTE — ASSESSMENT & PLAN NOTE
-Due to new L1 fracture   -Hydromorphone 1 mg IV q4h prn for severe pain  -Oxycodone 10 mg q4h prn for moderate pain   -Per spine surgery  MRI L sp 4/27 (partially completed): acute L1 compression fx with mild retropulsion. No severe stenosis. post kyphopasty at T11, L3   -Xrays ordered per NSGY to assess for stability; TSLO brace  -Repeat MRI w/ contrast w/ sedation pushed at least until 4/30 to evaluate for pathologic etiology before considering kyphoplasty  -chest CT for malignancy rule out - results pending

## 2024-04-29 NOTE — PROGRESS NOTES
[facility] - Neurosurgery ([facility])  [facility] Medicine  Progress Note    Patient Name: Nithin Wagner  MRN: [id number]  Patient Class: IP- Inpatient   Admission Date: 4/26/2024  Length of Stay: 1 days  Attending Physician: Jose Hagen MD  Primary Care Provider: Tushar Drew MD        Subjective:     Principal Problem:Intractable pain        HPI:  Pt is a 68yoM s/p s/p T11 and L3 kyphoplasty on 4/22/2024, now with worsening back pain and diarrhea. Has had multiple ED visits in the past week, CT A/P done yesterday showed Interval compression deformity involving the superior endplate of L1 as well as enteritis, he was discharged home on cipro/flagyl but never started taking them. Was seen by pain management and recommended admission for intractable pain.     in the ED, vital signs are stable, cbc/cmp unremarkable, ESR/CRP down trending.  patient has no red flags for infection/spinal involvement beyond pain and has reassuring neurological examination.  Patient has  required 2 mg of IV hydromorphone.      At bedside, complained of low back pain, denies worsening numbness in his legs, no fecal/urinary incontinence, no fevers, no night sweats. Complained of watery diarrhea for few days, he states that few of his friends have contracted a diarrheal illness as well. Diarrhea is associated with abdominal pain, no nausea/no vomiting. Hasn't been able to eat well and hasn't taking most of his medications in the past few days.     Overview/Hospital Course:  Per NSGY L-spine MRI (partially completed) read showed   acute L1 compression fx with mild retropulsion. No severe stenosis. post kyphopasty at T11, L3. C diff was negative, started on imodium. Pt reports improvement in diarrhea; 1 loose nonbloody bm since 4 am, not as watery anymore.     Interval History:  pt reports diarrhea stopped. Waiting on CT chest read; MRI anesthesia not available until 4/30; waiting on TLSO brace w/ xrays. Afebrile. Reports some lower abd  discomfort    Review of Systems  Objective:     Vital Signs (Most Recent):  Temp: 97.6 °F (36.4 °C) (04/29/24 0809)  Pulse: 110 (04/29/24 0905)  Resp: 18 (04/29/24 0809)  BP: 139/78 (04/29/24 0809)  SpO2: (!) 92 % (04/29/24 0809) Vital Signs (24h Range):  Temp:  [97.6 °F (36.4 °C)-99.4 °F (37.4 °C)] 97.6 °F (36.4 °C)  Pulse:  [] 110  Resp:  [16-20] 18  SpO2:  [92 %-95 %] 92 %  BP: (122-164)/(68-90) 139/78     Weight: 91.3 kg (201 lb 4.5 oz)  Body mass index is 28.07 kg/m².    Intake/Output Summary (Last 24 hours) at 4/29/2024 1025  Last data filed at 4/29/2024 0619  Gross per 24 hour   Intake 200 ml   Output 800 ml   Net -600 ml         Physical Exam      Clear lungs BL, unlabored breathing, on RA, no cyanosis  Hrt sounds RRR  AA, NAD   No LE edema  4/5 hip flexion BL  Abd soft, ND,  5/5 fist  BL  No facial droop, no slurred speech      Assessment/Plan:      * Intractable pain  -Due to new L1 fracture   -Hydromorphone 1 mg IV q4h prn for severe pain  -Oxycodone 10 mg q4h prn for moderate pain   -Per spine surgery  MRI L sp 4/27 (partially completed): acute L1 compression fx with mild retropulsion. No severe stenosis. post kyphopasty at T11, L3   -Xrays ordered per NSGY to assess for stability; TSLO brace  -Repeat MRI w/ contrast w/ sedation pushed at least until 4/30 to evaluate for pathologic etiology before considering kyphoplasty  -chest CT for malignancy rule out - results pending        Lumbar vertebral fracture  See above      Diarrhea of presumed infectious origin  C diff testing negative  Afebrile; no abx needed at this time  CT showing   Liquid stool throughout the large bowel and within several small bowel loops, taken together, suggests diarrheal illness possibly in the setting of enteritis. And Left perinephric fat stranding noting somewhat delayed excretion of contrast by the left kidney.     Imodium prn  Start probiotic  CRP is downtrending      Rheumatoid arthritis involving multiple sites  with positive rheumatoid factor  Hx of RA on tocilizumab, last in march 2024, supposed to be on lamivudine for hep B ppx    Supraventricular tachycardia  Hx of SVT on digoxin, coreg but hasn't been taking coreg    Continue digoxin for now   Will give IV magnesium 2gm  Optimize potassium > 4    Coronary artery disease  Hx of CAD  Hold ASA for now     Type 2 diabetes mellitus with hyperglycemia, with long-term current use of insulin  Hasn't been taking insulin  Will hold off on scheduled insulin  Monitor glucose AC/HS and utilize prn insulin       Systolic heart failure  Hasn't been on lasix   Currently hypovolemic due to diarrhea, s/p 1 L of IVF in the ED  Will encourage PO hydration and utilize IV fluids if needed       Hepatitis B core antibody positive  Patient is supposed to be on ppx with lamivudine but hasn't been taking it for few weeks  Will hold off resuming it for now, needs to be resumed at discharge         VTE Risk Mitigation (From admission, onward)           Ordered     enoxaparin injection 40 mg  Daily         04/27/24 0133     IP VTE HIGH RISK PATIENT  Once         04/27/24 0133     Place sequential compression device  Until discontinued         04/27/24 0133                    Discharge Planning   ALEXANDER: 4/29/2024     Code Status: Full Code   Is the patient medically ready for discharge?:     Reason for patient still in hospital (select all that apply): Patient trending condition, Laboratory test, Treatment, Imaging, and Consult recommendations  Discharge Plan A: Home Health                  Jose Hagen MD  Department of Hospital Medicine   [facility] Neurosurgery ([facility])

## 2024-04-29 NOTE — PLAN OF CARE
CHW met with patient/family at bedside. Patient experience rounding completed and reviewed the following.     Do you know your discharge plan? Yes or No,    If yes, what is the plan?    Yes Home     Have you discussed your needs and preferences with your SW/CM? Yes      If you are discharging home, do you have help at home? Yes  Patient has help at home.    Do you think you will need help additional at home at discharge?  No   Patient has no need for additional help.    Do you currently have difficulty keeping up with bills, affording medicine or buying food?  No  Patient has help with bills, food, and medicine.    Assigned SW/CM notified of any patient/family needs or concerns. Appropriate resources provided to address patient's needs.    Diane MIGUEL  Case Management  [phone]

## 2024-04-29 NOTE — SUBJECTIVE & OBJECTIVE
Interval History:  pt reports diarrhea stopped. Waiting on CT chest read; MRI anesthesia not available until 4/30; waiting on TLSO brace w/ xrays. Afebrile. Reports some lower abd discomfort    Review of Systems  Objective:     Vital Signs (Most Recent):  Temp: 97.6 °F (36.4 °C) (04/29/24 0809)  Pulse: 110 (04/29/24 0905)  Resp: 18 (04/29/24 0809)  BP: 139/78 (04/29/24 0809)  SpO2: (!) 92 % (04/29/24 0809) Vital Signs (24h Range):  Temp:  [97.6 °F (36.4 °C)-99.4 °F (37.4 °C)] 97.6 °F (36.4 °C)  Pulse:  [] 110  Resp:  [16-20] 18  SpO2:  [92 %-95 %] 92 %  BP: (122-164)/(68-90) 139/78     Weight: 91.3 kg (201 lb 4.5 oz)  Body mass index is 28.07 kg/m².    Intake/Output Summary (Last 24 hours) at 4/29/2024 1025  Last data filed at 4/29/2024 0619  Gross per 24 hour   Intake 200 ml   Output 800 ml   Net -600 ml         Physical Exam      Clear lungs BL, unlabored breathing, on RA, no cyanosis  Hrt sounds RRR  AA, NAD   No LE edema  4/5 hip flexion BL  Abd soft, ND,  5/5 fist  BL  No facial droop, no slurred speech

## 2024-04-29 NOTE — TELEPHONE ENCOUNTER
Left voice message for patient to return call to schedule appointment from referral to Functional Restoration department. Contact number provided, and My Chart message to be sent.  Tiff VALDEZ [phone]

## 2024-04-29 NOTE — SUBJECTIVE & OBJECTIVE
Interval History: OVI. R HF 4-/5, states this has been ongoing since March due to pain from sciatica. Otherwise neurologically stable. Pain has improved. TLSO not at bedside, attempted to contact brace line x 2. Imaging is still pending.     Medications:  Continuous Infusions:  Current Facility-Administered Medications   Medication Dose Route Frequency Last Rate Last Admin    sodium chloride 0.9%   Intravenous Continuous 75 mL/hr at 04/29/24 0911 New Bag at 04/29/24 0911     Scheduled Meds:  Current Facility-Administered Medications   Medication Dose Route Frequency    digoxin  0.125 mg Oral Daily    enoxparin  40 mg Subcutaneous Daily    EScitalopram oxalate  20 mg Oral QHS    gabapentin  300 mg Oral BID    Lactobacillus rhamnosus GG  1 capsule Oral Daily     PRN Meds:  Current Facility-Administered Medications:     dextrose 10%, 12.5 g, Intravenous, PRN    dextrose 10%, 25 g, Intravenous, PRN    glucagon (human recombinant), 1 mg, Intramuscular, PRN    glucose, 16 g, Oral, PRN    glucose, 24 g, Oral, PRN    HYDROmorphone, 1 mg, Intravenous, Q3H PRN    HYDROmorphone, 1 mg, Intravenous, Once PRN    insulin aspart U-100, 0-5 Units, Subcutaneous, QID (AC + HS) PRN    loperamide, 2 mg, Oral, QID PRN    naloxone, 0.02 mg, Intravenous, PRN    ondansetron, 4 mg, Intravenous, Q8H PRN    oxyCODONE, 10 mg, Oral, Q4H PRN    sodium chloride 0.9%, 10 mL, Intravenous, Q12H PRN     Review of Systems  Objective:     Weight: 91.3 kg (201 lb 4.5 oz)  Body mass index is 28.07 kg/m².  Vital Signs (Most Recent):  Temp: 97.6 °F (36.4 °C) (04/29/24 0809)  Pulse: 110 (04/29/24 0905)  Resp: 18 (04/29/24 0809)  BP: 139/78 (04/29/24 0809)  SpO2: (!) 92 % (04/29/24 0809) Vital Signs (24h Range):  Temp:  [97.6 °F (36.4 °C)-99.4 °F (37.4 °C)] 97.6 °F (36.4 °C)  Pulse:  [] 110  Resp:  [16-20] 18  SpO2:  [92 %-95 %] 92 %  BP: (122-164)/(68-90) 139/78             Neurosurgery Physical Exam  General: well developed, well nourished, no  "distress.   HEENT: normocephalic, atraumatic  CV: regular rate   Pulmonary: normal respirations, no signs of respiratory distress  Abdomen: soft, non-distended, not tender to palpation  Skin: Skin is warm, dry and intact  Heme: no bruising     Neuro:   Mental Status: AO x4  CN II-XII intact  Sensory: intact to light touch throughout  Motor: 4-/5 in R HF, otherwise 5/5 throughout  Reflexes: -Brewer's, -Babinski, no clonus. Patellar: 2+ bilaterally   Gait: deferred  Back: +tenderness to palpation along spinous processes in lumbar region    Significant Labs:  Recent Labs   Lab 04/28/24  0720 04/29/24  0242   * 143*    136   K 3.9 4.2    104   CO2 18* 19*   BUN 9 7*   CREATININE 0.7 0.7   CALCIUM 8.6* 8.6*   MG 1.6 1.6     Recent Labs   Lab 04/28/24  0720 04/29/24  0242   WBC 9.07 9.45   HGB 16.0 15.4   HCT 48.0 45.4    262     No results for input(s): "LABPT", "INR", "APTT" in the last 48 hours.  Microbiology Results (last 7 days)       Procedure Component Value Units Date/Time    Blood Culture #1 **CANNOT BE ORDERED STAT** [[id number]] Collected: 04/26/24 1712    Order Status: Completed Specimen: Blood from Peripheral, Forearm, Left Updated: 04/28/24 1812     Blood Culture, Routine No Growth to date      No Growth to date      No Growth to date    Blood Culture #2 **CANNOT BE ORDERED STAT** [[id number]] Collected: 04/26/24 1712    Order Status: Completed Specimen: Blood from Peripheral, Antecubital, Right Updated: 04/28/24 1812     Blood Culture, Routine No Growth to date      No Growth to date      No Growth to date    Clostridium difficile EIA [[id number]] Collected: 04/26/24 2317    Order Status: Completed Specimen: Stool Updated: 04/27/24 1140     C. diff Antigen Negative     C difficile Toxins A+B, EIA Negative     Comment: Testing not recommended for children <24 months old.             All pertinent labs from the last 24 hours have been reviewed.    Significant Diagnostics:  I have " reviewed and interpreted all pertinent imaging results/findings within the past 24 hours.

## 2024-04-29 NOTE — SUBJECTIVE & OBJECTIVE
Interval History: 4/28: discussed weight loss and anorexia, has radiation exposure from work/ as well, will get CT chest and contrasted spine MRI to r/o pathologic fx and malignancy    Medications:  Continuous Infusions:  Current Facility-Administered Medications   Medication Dose Route Frequency Last Rate Last Admin     Scheduled Meds:  Current Facility-Administered Medications   Medication Dose Route Frequency    digoxin  0.125 mg Oral Daily    enoxparin  40 mg Subcutaneous Daily    EScitalopram oxalate  20 mg Oral QHS    gabapentin  300 mg Oral BID    Lactobacillus rhamnosus GG  1 capsule Oral Daily     PRN Meds:  Current Facility-Administered Medications:     dextrose 10%, 12.5 g, Intravenous, PRN    dextrose 10%, 25 g, Intravenous, PRN    glucagon (human recombinant), 1 mg, Intramuscular, PRN    glucose, 16 g, Oral, PRN    glucose, 24 g, Oral, PRN    HYDROmorphone, 1 mg, Intravenous, Q3H PRN    insulin aspart U-100, 0-5 Units, Subcutaneous, QID (AC + HS) PRN    loperamide, 2 mg, Oral, QID PRN    naloxone, 0.02 mg, Intravenous, PRN    ondansetron, 4 mg, Intravenous, Q8H PRN    oxyCODONE, 10 mg, Oral, Q4H PRN    sodium chloride 0.9%, 10 mL, Intravenous, Q12H PRN     Review of Systems  Objective:     Weight: 91.3 kg (201 lb 4.5 oz)  Body mass index is 28.07 kg/m².  Vital Signs (Most Recent):  Temp: 98.5 °F (36.9 °C) (04/29/24 0025)  Pulse: 107 (04/29/24 0025)  Resp: 18 (04/29/24 0025)  BP: 122/68 (04/29/24 0025)  SpO2: 95 % (04/29/24 0025) Vital Signs (24h Range):  Temp:  [97.8 °F (36.6 °C)-98.8 °F (37.1 °C)] 98.5 °F (36.9 °C)  Pulse:  [] 107  Resp:  [16-20] 18  SpO2:  [93 %-95 %] 95 %  BP: (122-149)/(68-86) 122/68                                 Physical Exam         Neurosurgery Physical Exam    General: well developed, well nourished, no distress.   HEENT: normocephalic, atraumatic  CV: regular rate   Pulmonary: normal respirations, no signs of respiratory distress  Abdomen: soft, non-distended,  "not tender to palpation  Skin: Skin is warm, dry and intact  Heme: no bruising     Neuro:   Mental Status: AO x4  CN II-XII intact  Sensory: intact to light touch throughout  Motor:  5/5 throughout  Reflexes: -Brewer's, -Babinski, no clonus. Patellar: 2+ bilaterally   Gait: deferred  Back: +tenderness to palpation along spinous processes in lumbar region    Significant Labs:  Recent Labs   Lab 04/27/24  0558 04/28/24  0720   * 140*    136   K 3.8 3.9    106   CO2 16* 18*   BUN 8 9   CREATININE 0.8 0.7   CALCIUM 8.9 8.6*   MG 1.9 1.6     Recent Labs   Lab 04/27/24  0558 04/28/24  0720   WBC 9.45 9.07   HGB 15.2 16.0   HCT 46.1 48.0    272     No results for input(s): "LABPT", "INR", "APTT" in the last 48 hours.  Microbiology Results (last 7 days)       Procedure Component Value Units Date/Time    Blood Culture #1 **CANNOT BE ORDERED STAT** [[id number]] Collected: 04/26/24 1712    Order Status: Completed Specimen: Blood from Peripheral, Forearm, Left Updated: 04/28/24 1812     Blood Culture, Routine No Growth to date      No Growth to date      No Growth to date    Blood Culture #2 **CANNOT BE ORDERED STAT** [[id number]] Collected: 04/26/24 1712    Order Status: Completed Specimen: Blood from Peripheral, Antecubital, Right Updated: 04/28/24 1812     Blood Culture, Routine No Growth to date      No Growth to date      No Growth to date    Clostridium difficile EIA [[id number]] Collected: 04/26/24 2317    Order Status: Completed Specimen: Stool Updated: 04/27/24 1140     C. diff Antigen Negative     C difficile Toxins A+B, EIA Negative     Comment: Testing not recommended for children <24 months old.             All pertinent labs from the last 24 hours have been reviewed.    Significant Diagnostics:  CT: No results found in the last 24 hours.  MRI: No results found in the last 24 hours.  "

## 2024-04-29 NOTE — PLAN OF CARE
Problem: Adult Inpatient Plan of Care  Goal: Plan of Care Review  Outcome: Progressing  Goal: Patient-Specific Goal (Individualized)  Outcome: Progressing  Goal: Absence of Hospital-Acquired Illness or Injury  Outcome: Progressing  Goal: Optimal Comfort and Wellbeing  Outcome: Progressing  Goal: Readiness for Transition of Care  Outcome: Progressing     Problem: Diabetes Comorbidity  Goal: Blood Glucose Level Within Targeted Range  Outcome: Progressing     Problem: Infection  Goal: Absence of Infection Signs and Symptoms  Outcome: Progressing     Problem: Wound  Goal: Optimal Coping  Outcome: Progressing  Goal: Optimal Functional Ability  Outcome: Progressing  Goal: Absence of Infection Signs and Symptoms  Outcome: Progressing  Goal: Improved Oral Intake  Outcome: Progressing  Goal: Optimal Pain Control and Function  Outcome: Progressing  Goal: Skin Health and Integrity  Outcome: Progressing  Goal: Optimal Wound Healing  Outcome: Progressing     Problem: Adult Inpatient Plan of Care  Goal: Plan of Care Review  Outcome: Progressing

## 2024-04-29 NOTE — PROGRESS NOTES
[facility] - Neurosurgery ([facility])  Neurosurgery  Progress Note    Subjective:     History of Present Illness: Mr. Wagner is a 68M s/p T11 and L3 kyphoplasty with pain management Dr. Aviles on 4/22/2024, now with worsening back pain and diarrhea. Has had multiple ED visits in the past week, CT A/P and MRI show new acute compression fx of L1 with mild retropulsion. Patient denies any recent trauma or falls. Has severe axial low back pain, no radicular pain. Able to walk with cane, but has severe pain with any movements, is most comfortable lying still in bed. No weakness, numbness, or bladder sx. Has diarrhea, on c diff rule out. Does not yet have TLSO brace.     Post-Op Info:  * No surgery found *       Interval History: 4/28: discussed weight loss and anorexia, has radiation exposure from work/ as well, will get CT chest and contrasted spine MRI to r/o pathologic fx and malignancy    Medications:  Continuous Infusions:  Current Facility-Administered Medications   Medication Dose Route Frequency Last Rate Last Admin     Scheduled Meds:  Current Facility-Administered Medications   Medication Dose Route Frequency    digoxin  0.125 mg Oral Daily    enoxparin  40 mg Subcutaneous Daily    EScitalopram oxalate  20 mg Oral QHS    gabapentin  300 mg Oral BID    Lactobacillus rhamnosus GG  1 capsule Oral Daily     PRN Meds:  Current Facility-Administered Medications:     dextrose 10%, 12.5 g, Intravenous, PRN    dextrose 10%, 25 g, Intravenous, PRN    glucagon (human recombinant), 1 mg, Intramuscular, PRN    glucose, 16 g, Oral, PRN    glucose, 24 g, Oral, PRN    HYDROmorphone, 1 mg, Intravenous, Q3H PRN    insulin aspart U-100, 0-5 Units, Subcutaneous, QID (AC + HS) PRN    loperamide, 2 mg, Oral, QID PRN    naloxone, 0.02 mg, Intravenous, PRN    ondansetron, 4 mg, Intravenous, Q8H PRN    oxyCODONE, 10 mg, Oral, Q4H PRN    sodium chloride 0.9%, 10 mL, Intravenous, Q12H PRN     Review of Systems  Objective:     Weight:  "91.3 kg (201 lb 4.5 oz)  Body mass index is 28.07 kg/m².  Vital Signs (Most Recent):  Temp: 98.5 °F (36.9 °C) (04/29/24 0025)  Pulse: 107 (04/29/24 0025)  Resp: 18 (04/29/24 0025)  BP: 122/68 (04/29/24 0025)  SpO2: 95 % (04/29/24 0025) Vital Signs (24h Range):  Temp:  [97.8 °F (36.6 °C)-98.8 °F (37.1 °C)] 98.5 °F (36.9 °C)  Pulse:  [] 107  Resp:  [16-20] 18  SpO2:  [93 %-95 %] 95 %  BP: (122-149)/(68-86) 122/68                                 Physical Exam         Neurosurgery Physical Exam    General: well developed, well nourished, no distress.   HEENT: normocephalic, atraumatic  CV: regular rate   Pulmonary: normal respirations, no signs of respiratory distress  Abdomen: soft, non-distended, not tender to palpation  Skin: Skin is warm, dry and intact  Heme: no bruising     Neuro:   Mental Status: AO x4  CN II-XII intact  Sensory: intact to light touch throughout  Motor:  5/5 throughout  Reflexes: -Brewer's, -Babinski, no clonus. Patellar: 2+ bilaterally   Gait: deferred  Back: +tenderness to palpation along spinous processes in lumbar region    Significant Labs:  Recent Labs   Lab 04/27/24  0558 04/28/24  0720   * 140*    136   K 3.8 3.9    106   CO2 16* 18*   BUN 8 9   CREATININE 0.8 0.7   CALCIUM 8.9 8.6*   MG 1.9 1.6     Recent Labs   Lab 04/27/24  0558 04/28/24  0720   WBC 9.45 9.07   HGB 15.2 16.0   HCT 46.1 48.0    272     No results for input(s): "LABPT", "INR", "APTT" in the last 48 hours.  Microbiology Results (last 7 days)       Procedure Component Value Units Date/Time    Blood Culture #1 **CANNOT BE ORDERED STAT** [[id number]] Collected: 04/26/24 1712    Order Status: Completed Specimen: Blood from Peripheral, Forearm, Left Updated: 04/28/24 1812     Blood Culture, Routine No Growth to date      No Growth to date      No Growth to date    Blood Culture #2 **CANNOT BE ORDERED STAT** [[id number]] Collected: 04/26/24 1712    Order Status: Completed Specimen: Blood from " Peripheral, Antecubital, Right Updated: 04/28/24 1812     Blood Culture, Routine No Growth to date      No Growth to date      No Growth to date    Clostridium difficile EIA [[id number]] Collected: 04/26/24 2317    Order Status: Completed Specimen: Stool Updated: 04/27/24 1140     C. diff Antigen Negative     C difficile Toxins A+B, EIA Negative     Comment: Testing not recommended for children <24 months old.             All pertinent labs from the last 24 hours have been reviewed.    Significant Diagnostics:  CT: No results found in the last 24 hours.  MRI: No results found in the last 24 hours.  Assessment/Plan:     Lumbar vertebral fracture  Mr. Wagner is a 68M s/p T11 and L3 kyphoplasty with pain management Dr. Aviles on 4/22/2024, now with worsening back pain and diarrhea. Has had multiple ED visits in the past week, CT A/P and MRI show new acute compression fx of L1 with mild retropulsion. In significant pain but remains neuro intact.    MRI L sp 4/27 (partially completed): acute L1 compression fx with mild retropulsion. No severe stenosis. post kyphopasty at T11, L3     - admitted to medicine for pain control  - recommend TLSO brace, standing thoracolumbar XR in TLSO brace to assess stability  - MRI L sp w/wo with anesthesia scheduled for Monday to assess for possible pathologic fx  - CT chest malignancy workup completed, R upper lobe opacity, f/u read  - consideration of further kyphoplasty at L1, patient not interested at this time  - optimization of bone quality per primary team and endocrine            Lexi Gee MD  Neurosurgery  [facility] - Neurosurgery ([facility])

## 2024-04-29 NOTE — PROGRESS NOTES
[facility] - Neurosurgery ([facility])  Neurosurgery  Progress Note    Subjective:     History of Present Illness: Mr. Wagner is a 68M s/p T11 and L3 kyphoplasty with pain management Dr. Aviles on 4/22/2024, now with worsening back pain and diarrhea. Has had multiple ED visits in the past week, CT A/P and MRI show new acute compression fx of L1 with mild retropulsion. Patient denies any recent trauma or falls. Has severe axial low back pain, no radicular pain. Able to walk with cane, but has severe pain with any movements, is most comfortable lying still in bed. No weakness, numbness, or bladder sx. Has diarrhea, on c diff rule out. Does not yet have TLSO brace.     Post-Op Info:  * No surgery found *       Interval History: RADHA EMERY  4-/5, states this has been ongoing since March due to pain from sciatica. Otherwise neurologically stable. Pain has improved. TLSO not at bedside, attempted to contact brace line x 2. Imaging is still pending.     Medications:  Continuous Infusions:  Current Facility-Administered Medications   Medication Dose Route Frequency Last Rate Last Admin    sodium chloride 0.9%   Intravenous Continuous 75 mL/hr at 04/29/24 0911 New Bag at 04/29/24 0911     Scheduled Meds:  Current Facility-Administered Medications   Medication Dose Route Frequency    digoxin  0.125 mg Oral Daily    enoxparin  40 mg Subcutaneous Daily    EScitalopram oxalate  20 mg Oral QHS    gabapentin  300 mg Oral BID    Lactobacillus rhamnosus GG  1 capsule Oral Daily     PRN Meds:  Current Facility-Administered Medications:     dextrose 10%, 12.5 g, Intravenous, PRN    dextrose 10%, 25 g, Intravenous, PRN    glucagon (human recombinant), 1 mg, Intramuscular, PRN    glucose, 16 g, Oral, PRN    glucose, 24 g, Oral, PRN    HYDROmorphone, 1 mg, Intravenous, Q3H PRN    HYDROmorphone, 1 mg, Intravenous, Once PRN    insulin aspart U-100, 0-5 Units, Subcutaneous, QID (AC + HS) PRN    loperamide, 2 mg, Oral, QID PRN    naloxone, 0.02  "mg, Intravenous, PRN    ondansetron, 4 mg, Intravenous, Q8H PRN    oxyCODONE, 10 mg, Oral, Q4H PRN    sodium chloride 0.9%, 10 mL, Intravenous, Q12H PRN     Review of Systems  Objective:     Weight: 91.3 kg (201 lb 4.5 oz)  Body mass index is 28.07 kg/m².  Vital Signs (Most Recent):  Temp: 97.6 °F (36.4 °C) (04/29/24 0809)  Pulse: 110 (04/29/24 0905)  Resp: 18 (04/29/24 0809)  BP: 139/78 (04/29/24 0809)  SpO2: (!) 92 % (04/29/24 0809) Vital Signs (24h Range):  Temp:  [97.6 °F (36.4 °C)-99.4 °F (37.4 °C)] 97.6 °F (36.4 °C)  Pulse:  [] 110  Resp:  [16-20] 18  SpO2:  [92 %-95 %] 92 %  BP: (122-164)/(68-90) 139/78             Neurosurgery Physical Exam  General: well developed, well nourished, no distress.   HEENT: normocephalic, atraumatic  CV: regular rate   Pulmonary: normal respirations, no signs of respiratory distress  Abdomen: soft, non-distended, not tender to palpation  Skin: Skin is warm, dry and intact  Heme: no bruising     Neuro:   Mental Status: AO x4  CN II-XII intact  Sensory: intact to light touch throughout  Motor: 4-/5 in L HF, otherwise 5/5 throughout  Reflexes: -Brewer's, -Babinski, no clonus. Patellar: 2+ bilaterally   Gait: deferred  Back: +tenderness to palpation along spinous processes in lumbar region    Significant Labs:  Recent Labs   Lab 04/28/24  0720 04/29/24  0242   * 143*    136   K 3.9 4.2    104   CO2 18* 19*   BUN 9 7*   CREATININE 0.7 0.7   CALCIUM 8.6* 8.6*   MG 1.6 1.6     Recent Labs   Lab 04/28/24  0720 04/29/24  0242   WBC 9.07 9.45   HGB 16.0 15.4   HCT 48.0 45.4    262     No results for input(s): "LABPT", "INR", "APTT" in the last 48 hours.  Microbiology Results (last 7 days)       Procedure Component Value Units Date/Time    Blood Culture #1 **CANNOT BE ORDERED STAT** [[id number]] Collected: 04/26/24 1712    Order Status: Completed Specimen: Blood from Peripheral, Forearm, Left Updated: 04/28/24 1812     Blood Culture, Routine No Growth to " date      No Growth to date      No Growth to date    Blood Culture #2 **CANNOT BE ORDERED STAT** [[id number]] Collected: 04/26/24 1712    Order Status: Completed Specimen: Blood from Peripheral, Antecubital, Right Updated: 04/28/24 1812     Blood Culture, Routine No Growth to date      No Growth to date      No Growth to date    Clostridium difficile EIA [[id number]] Collected: 04/26/24 2317    Order Status: Completed Specimen: Stool Updated: 04/27/24 1140     C. diff Antigen Negative     C difficile Toxins A+B, EIA Negative     Comment: Testing not recommended for children <24 months old.             All pertinent labs from the last 24 hours have been reviewed.    Significant Diagnostics:  I have reviewed and interpreted all pertinent imaging results/findings within the past 24 hours.  Assessment/Plan:     Lumbar vertebral fracture  Mr. Wagner is a 68M s/p T11 and L3 kyphoplasty with pain management Dr. Aviles on 4/22/2024, now with worsening back pain and diarrhea. Has had multiple ED visits in the past week, CT A/P and MRI show new acute compression fx of L1 with mild retropulsion. In significant pain but remains neuro intact.    MRI L sp 4/27 (partially completed): acute L1 compression fx with mild retropulsion. No severe stenosis. post kyphopasty at T11, L3     - admitted to medicine for pain control  - recommend TLSO brace, standing thoracolumbar XR in TLSO brace to assess stability  - MRI L sp w/wo with anesthesia scheduled for Monday to assess for possible pathologic fx  - CT chest malignancy workup completed, R upper lobe opacity, f/u read  - consideration of further kyphoplasty at L1, patient not interested at this time  - optimization of bone quality per primary team and endocrine            Alison Hoff PA-C  Neurosurgery  [facility] - Neurosurgery ([facility])

## 2024-04-29 NOTE — PLAN OF CARE
[facility] - Neurosurgery (Hospital)  Discharge Reassessment    Primary Care Provider: Tushar Drew MD    Expected Discharge Date: 4/29/2024    Reassessment (most recent)       Discharge Reassessment - 04/29/24 0916          Discharge Reassessment    Assessment Type Discharge Planning Reassessment     Did the patient's condition or plan change since previous assessment? Yes     Discharge Plan discussed with: Patient     Communicated ALEXANDER with patient/caregiver Date not available/Unable to determine     Discharge Plan A Home Health (P)      Discharge Plan B Skilled Nursing Facility (P)      DME Needed Upon Discharge  -- (P)    TBD    Transition of Care Barriers None (P)      Why the patient remains in the hospital Requires continued medical care (P)                      Pt not ready for discharge due to - MRI L sp w/wo with anesthesia scheduled for today to assess for possible pathologic fx   CM will remain available for families.  Currently pt has d/c plans in progress at this time.    Discharge Plan A and Plan B have been determined by review of patient's clinical status, future medical and therapeutic needs, and coverage/benefits for post-acute care in coordination with multidisciplinary team members.

## 2024-04-29 NOTE — HOSPITAL COURSE
4/28: discussed weight loss and anorexia, has radiation exposure from work/ as well, will get CT chest and contrasted spine MRI to r/o pathologic fx and malignancy  5/2: NAEO. MRI completed, L1 compression fx with 6mm of retropulsion, with mild canal stenosis. Fracture is stable on upright X-ray. Patient declining kyphoplasty at this time. Will plan to follow up outpatient.

## 2024-04-30 ENCOUNTER — ANESTHESIA (OUTPATIENT)
Dept: ENDOSCOPY | Facility: HOSPITAL | Age: 69
DRG: 543 | End: 2024-04-30
Payer: MEDICARE

## 2024-04-30 ENCOUNTER — ANESTHESIA EVENT (OUTPATIENT)
Dept: ENDOSCOPY | Facility: HOSPITAL | Age: 69
DRG: 543 | End: 2024-04-30
Payer: MEDICARE

## 2024-04-30 PROBLEM — K52.9 ENTERITIS: Status: ACTIVE | Noted: 2024-04-30

## 2024-04-30 PROBLEM — N20.0 NEPHROLITHIASIS: Status: ACTIVE | Noted: 2024-04-30

## 2024-04-30 PROBLEM — S22.019A: Status: ACTIVE | Noted: 2024-04-30

## 2024-04-30 PROBLEM — I50.32 CHRONIC DIASTOLIC HEART FAILURE: Status: ACTIVE | Noted: 2020-11-12

## 2024-04-30 LAB
POCT GLUCOSE: 146 MG/DL (ref 70–110)
POCT GLUCOSE: 153 MG/DL (ref 70–110)
POCT GLUCOSE: 153 MG/DL (ref 70–110)
POCT GLUCOSE: 200 MG/DL (ref 70–110)

## 2024-04-30 PROCEDURE — 25000003 PHARM REV CODE 250: Performed by: NURSE ANESTHETIST, CERTIFIED REGISTERED

## 2024-04-30 PROCEDURE — 25500020 PHARM REV CODE 255: Performed by: HOSPITALIST

## 2024-04-30 PROCEDURE — 63600175 PHARM REV CODE 636 W HCPCS: Performed by: INTERNAL MEDICINE

## 2024-04-30 PROCEDURE — 63600175 PHARM REV CODE 636 W HCPCS: Performed by: NURSE ANESTHETIST, CERTIFIED REGISTERED

## 2024-04-30 PROCEDURE — 11000001 HC ACUTE MED/SURG PRIVATE ROOM

## 2024-04-30 PROCEDURE — 71000044 HC DOSC ROUTINE RECOVERY FIRST HOUR

## 2024-04-30 PROCEDURE — 37000009 HC ANESTHESIA EA ADD 15 MINS

## 2024-04-30 PROCEDURE — D9220A PRA ANESTHESIA: Mod: ANES,,, | Performed by: ANESTHESIOLOGY

## 2024-04-30 PROCEDURE — D9220A PRA ANESTHESIA: Mod: CRNA,,, | Performed by: NURSE ANESTHETIST, CERTIFIED REGISTERED

## 2024-04-30 PROCEDURE — A9585 GADOBUTROL INJECTION: HCPCS | Performed by: HOSPITALIST

## 2024-04-30 PROCEDURE — 25000003 PHARM REV CODE 250: Performed by: STUDENT IN AN ORGANIZED HEALTH CARE EDUCATION/TRAINING PROGRAM

## 2024-04-30 PROCEDURE — 25000003 PHARM REV CODE 250: Performed by: INTERNAL MEDICINE

## 2024-04-30 PROCEDURE — 63600175 PHARM REV CODE 636 W HCPCS: Performed by: HOSPITALIST

## 2024-04-30 PROCEDURE — 25000003 PHARM REV CODE 250: Performed by: HOSPITALIST

## 2024-04-30 PROCEDURE — 37000008 HC ANESTHESIA 1ST 15 MINUTES

## 2024-04-30 RX ORDER — MIDAZOLAM HYDROCHLORIDE 1 MG/ML
INJECTION INTRAMUSCULAR; INTRAVENOUS
Status: DISCONTINUED | OUTPATIENT
Start: 2024-04-30 | End: 2024-04-30

## 2024-04-30 RX ORDER — LIDOCAINE HYDROCHLORIDE 20 MG/ML
INJECTION INTRAVENOUS
Status: DISCONTINUED | OUTPATIENT
Start: 2024-04-30 | End: 2024-04-30

## 2024-04-30 RX ORDER — SODIUM CHLORIDE 0.9 % (FLUSH) 0.9 %
3 SYRINGE (ML) INJECTION EVERY 30 MIN PRN
Status: CANCELLED | OUTPATIENT
Start: 2024-04-30

## 2024-04-30 RX ORDER — GADOBUTROL 604.72 MG/ML
10 INJECTION INTRAVENOUS
Status: COMPLETED | OUTPATIENT
Start: 2024-04-30 | End: 2024-04-30

## 2024-04-30 RX ORDER — ONDANSETRON HYDROCHLORIDE 2 MG/ML
4 INJECTION, SOLUTION INTRAVENOUS DAILY PRN
Status: CANCELLED | OUTPATIENT
Start: 2024-04-30

## 2024-04-30 RX ORDER — METHOCARBAMOL 500 MG/1
500 TABLET, FILM COATED ORAL 4 TIMES DAILY PRN
Status: DISCONTINUED | OUTPATIENT
Start: 2024-04-30 | End: 2024-05-01

## 2024-04-30 RX ORDER — PROPOFOL 10 MG/ML
VIAL (ML) INTRAVENOUS
Status: DISCONTINUED | OUTPATIENT
Start: 2024-04-30 | End: 2024-04-30

## 2024-04-30 RX ADMIN — ESCITALOPRAM OXALATE 20 MG: 20 TABLET, FILM COATED ORAL at 09:04

## 2024-04-30 RX ADMIN — GADOBUTROL 10 ML: 604.72 INJECTION INTRAVENOUS at 03:04

## 2024-04-30 RX ADMIN — HYDROMORPHONE HYDROCHLORIDE 1 MG: 1 INJECTION, SOLUTION INTRAMUSCULAR; INTRAVENOUS; SUBCUTANEOUS at 08:04

## 2024-04-30 RX ADMIN — GABAPENTIN 300 MG: 300 CAPSULE ORAL at 09:04

## 2024-04-30 RX ADMIN — METHOCARBAMOL 500 MG: 500 TABLET ORAL at 10:04

## 2024-04-30 RX ADMIN — PROPOFOL 30 MG: 10 INJECTION, EMULSION INTRAVENOUS at 02:04

## 2024-04-30 RX ADMIN — HYDROMORPHONE HYDROCHLORIDE 1 MG: 1 INJECTION, SOLUTION INTRAMUSCULAR; INTRAVENOUS; SUBCUTANEOUS at 04:04

## 2024-04-30 RX ADMIN — MIDAZOLAM HYDROCHLORIDE 1 MG: 2 INJECTION, SOLUTION INTRAMUSCULAR; INTRAVENOUS at 01:04

## 2024-04-30 RX ADMIN — METHOCARBAMOL 500 MG: 500 TABLET ORAL at 04:04

## 2024-04-30 RX ADMIN — LIDOCAINE HYDROCHLORIDE 40 MG: 20 INJECTION INTRAVENOUS at 02:04

## 2024-04-30 RX ADMIN — OXYCODONE HYDROCHLORIDE 10 MG: 10 TABLET ORAL at 09:04

## 2024-04-30 RX ADMIN — MIDAZOLAM HYDROCHLORIDE 0.5 MG: 2 INJECTION, SOLUTION INTRAMUSCULAR; INTRAVENOUS at 02:04

## 2024-04-30 RX ADMIN — OXYCODONE HYDROCHLORIDE 10 MG: 10 TABLET ORAL at 04:04

## 2024-04-30 RX ADMIN — OXYCODONE HYDROCHLORIDE 10 MG: 10 TABLET ORAL at 03:04

## 2024-04-30 RX ADMIN — HYDROMORPHONE HYDROCHLORIDE 1 MG: 1 INJECTION, SOLUTION INTRAMUSCULAR; INTRAVENOUS; SUBCUTANEOUS at 11:04

## 2024-04-30 RX ADMIN — METHOCARBAMOL 500 MG: 500 TABLET ORAL at 09:04

## 2024-04-30 RX ADMIN — GABAPENTIN 300 MG: 300 CAPSULE ORAL at 08:04

## 2024-04-30 RX ADMIN — Medication 1 CAPSULE: at 08:04

## 2024-04-30 RX ADMIN — OXYCODONE HYDROCHLORIDE 10 MG: 10 TABLET ORAL at 07:04

## 2024-04-30 RX ADMIN — ENOXAPARIN SODIUM 40 MG: 40 INJECTION SUBCUTANEOUS at 04:04

## 2024-04-30 RX ADMIN — SODIUM CHLORIDE: 0.9 INJECTION, SOLUTION INTRAVENOUS at 12:04

## 2024-04-30 RX ADMIN — OXYCODONE HYDROCHLORIDE 10 MG: 10 TABLET ORAL at 12:04

## 2024-04-30 RX ADMIN — DIGOXIN 0.12 MG: 125 TABLET ORAL at 08:04

## 2024-04-30 RX ADMIN — HYDROMORPHONE HYDROCHLORIDE 1 MG: 1 INJECTION, SOLUTION INTRAMUSCULAR; INTRAVENOUS; SUBCUTANEOUS at 12:04

## 2024-04-30 NOTE — TRANSFER OF CARE
"Anesthesia Transfer of Care Note    Patient: Nithin Wagner    Procedure(s) Performed: Procedure(s) (LRB):  MRI (Magnetic Resonance Imagine) (N/A)    Patient location: PACU    Anesthesia Type: general    Transport from OR: Transported from OR on 6-10 L/min O2 by face mask with adequate spontaneous ventilation. Continuous SpO2 monitoring in transport    Post pain: adequate analgesia    Post assessment: no apparent anesthetic complications and tolerated procedure well    Post vital signs: stable    Level of consciousness: awake    Nausea/Vomiting: no nausea/vomiting    Complications: none    Transfer of care protocol was followed      Last vitals: Visit Vitals 04/30/24 1529  /79   Pulse 104   Temp 97.9   Resp 13   Ht 5' 11" (1.803 m)   Wt 92.6 kg (204 lb 2.3 oz)   SpO2 99%   BMI 28.47 kg/m²     "

## 2024-04-30 NOTE — CARE UPDATE
I have reviewed the chart of Nithin Wagner who is hospitalized for the following:    Active Hospital Problems    Diagnosis    *Intractable pain    Fracture of first thoracic vertebra     Acute compression fracture T1 with mild height loss and 7 mm osseous retropulsion resulting in moderate spinal canal stenosis.       Enteritis    Nephrolithiasis    Lumbar vertebral fracture    Diarrhea of presumed infectious origin    Rheumatoid arthritis involving multiple sites with positive rheumatoid factor    Supraventricular tachycardia    Coronary artery disease    Type 2 diabetes mellitus with hyperglycemia, with long-term current use of insulin    Chronic diastolic heart failure     Grade I diastolic dysfunction.   Follow up with cardiology      Hepatitis B core antibody positive        Bertha Mixon NP  [facility]

## 2024-04-30 NOTE — PROGRESS NOTES
[facility] - Neurosurgery ([facility])  [facility] Medicine  Progress Note    Patient Name: Nithin Wagner  MRN: [id number]  Patient Class: IP- Inpatient   Admission Date: 4/26/2024  Length of Stay: 2 days  Attending Physician: Jose Hagen MD  Primary Care Provider: Tushar Drew MD        Subjective:     Principal Problem:Intractable pain        HPI:  Pt is a 68yoM s/p s/p T11 and L3 kyphoplasty on 4/22/2024, now with worsening back pain and diarrhea. Has had multiple ED visits in the past week, CT A/P done yesterday showed Interval compression deformity involving the superior endplate of L1 as well as enteritis, he was discharged home on cipro/flagyl but never started taking them. Was seen by pain management and recommended admission for intractable pain.     in the ED, vital signs are stable, cbc/cmp unremarkable, ESR/CRP down trending.  patient has no red flags for infection/spinal involvement beyond pain and has reassuring neurological examination.  Patient has  required 2 mg of IV hydromorphone.      At bedside, complained of low back pain, denies worsening numbness in his legs, no fecal/urinary incontinence, no fevers, no night sweats. Complained of watery diarrhea for few days, he states that few of his friends have contracted a diarrheal illness as well. Diarrhea is associated with abdominal pain, no nausea/no vomiting. Hasn't been able to eat well and hasn't taking most of his medications in the past few days.     Overview/Hospital Course:  Per NSGY L-spine MRI (partially completed) read showed   acute L1 compression fx with mild retropulsion. No severe stenosis. post kyphopasty at T11, L3. C diff was negative, started on imodium. Pt reports improvement in diarrhea; 1 loose nonbloody bm since 4 am, not as watery anymore. CT chest neg for any definitive malignancy, has nonspecific lung nodules, outpt f/u.     Interval History:  pt still has abd and back pain. Afebrile; per RN no acute issues overnight.  Awaiting brace and sedated MRI    Review of Systems  Objective:     Vital Signs (Most Recent):  Temp: 98.5 °F (36.9 °C) (04/30/24 0731)  Pulse: 108 (04/30/24 0731)  Resp: 17 (04/30/24 0830)  BP: 133/62 (04/30/24 0731)  SpO2: (!) 93 % (04/30/24 0731) Vital Signs (24h Range):  Temp:  [97.6 °F (36.4 °C)-98.5 °F (36.9 °C)] 98.5 °F (36.9 °C)  Pulse:  [105-119] 108  Resp:  [16-19] 17  SpO2:  [92 %-94 %] 93 %  BP: (122-142)/(56-78) 133/62     Weight: 92.6 kg (204 lb 2.3 oz)  Body mass index is 28.47 kg/m².    Intake/Output Summary (Last 24 hours) at 4/30/2024 1027  Last data filed at 4/30/2024 0043  Gross per 24 hour   Intake 350 ml   Output 600 ml   Net -250 ml         Physical Exam      Clear lungs BL, unlabored breathing, on RA, no cyanosis  Hrt sounds RRR  AA, NAD  Moves BL ext's, hips currently flexed  No LE edema  No facial droop; no slurred speech  Abd soft, ND, mild diffuse abd TTP    Assessment/Plan:      * Intractable pain  -Due to new L1 fracture   -Hydromorphone 1 mg IV q4h prn for severe pain  -Oxycodone 10 mg q4h prn for moderate pain   -Per spine surgery  MRI L sp 4/27 (partially completed): acute L1 compression fx with mild retropulsion. No severe stenosis. post kyphopasty at T11, L3   -Xrays ordered per NSGY to assess for stability; TSLO brace  -Repeat MRI w/ contrast w/ sedation pushed at least until 4/30 to evaluate for pathologic etiology before considering kyphoplasty  -chest CT neg for any definitive malignancy ; has lung nodules - pt rec'd outpt f/u.        Lumbar vertebral fracture  See above      Diarrhea of presumed infectious origin  C diff testing negative  Afebrile; no abx needed at this time  Seems resolved now  CT showing   Liquid stool throughout the large bowel and within several small bowel loops, taken together, suggests diarrheal illness possibly in the setting of enteritis. And Left perinephric fat stranding noting somewhat delayed excretion of contrast by the left kidney.     Imodium  prn   probiotic  CRP is downtrending      Rheumatoid arthritis involving multiple sites with positive rheumatoid factor  Hx of RA on tocilizumab, last in march 2024, supposed to be on lamivudine for hep B ppx    Supraventricular tachycardia  Hx of SVT on digoxin, coreg but hasn't been taking coreg    Continue digoxin for now   Will give IV magnesium 2gm  Optimize potassium > 4    Coronary artery disease  Hx of CAD  Hold ASA for now     Type 2 diabetes mellitus with hyperglycemia, with long-term current use of insulin  Hasn't been taking insulin  Will hold off on scheduled insulin  Monitor glucose AC/HS and utilize prn insulin       Systolic heart failure  Hasn't been on lasix   Currently hypovolemic due to diarrhea, s/p 1 L of IVF in the ED  Will encourage PO hydration and utilize IV fluids if needed       Hepatitis B core antibody positive  Patient is supposed to be on ppx with lamivudine but hasn't been taking it for few weeks  Will hold off resuming it for now, needs to be resumed at discharge         VTE Risk Mitigation (From admission, onward)           Ordered     enoxaparin injection 40 mg  Daily         04/27/24 0133     IP VTE HIGH RISK PATIENT  Once         04/27/24 0133     Place sequential compression device  Until discontinued         04/27/24 0133                    Discharge Planning   ALEXANDER: 5/1/2024     Code Status: Full Code   Is the patient medically ready for discharge?:     Reason for patient still in hospital (select all that apply): Patient trending condition, Treatment, Imaging, and PT / OT recommendations  Discharge Plan A: Home Health                  Jose Hagen MD  Department of Hospital Medicine   [facility] - Neurosurgery ([facility])

## 2024-04-30 NOTE — NURSING TRANSFER
Nursing Transfer Note      4/30/2024   4:30PM    Nurse giving handoff:Ashli CHIU RN   Nurse receiving handoff:BE Gonzáels     Reason patient is being transferred: Inpatient post op MRI complete     Transfer from: [facility] Post Op 22 to 930 A     Transfer via bed    Transported by patient transport     Transfer Vital Signs:  Blood Pressure:121/76    Heart Rate: 101    O2:90-94% room air while sleeping     Temperature: 97.3    Respirations:20    Order for Tele Monitor? No    Additional Lines: none     Patient belongings transferred with patient: Yes    Chart send with patient: Yes

## 2024-04-30 NOTE — ASSESSMENT & PLAN NOTE
-Due to new L1 fracture   -Hydromorphone 1 mg IV q4h prn for severe pain  -Oxycodone 10 mg q4h prn for moderate pain   -Per spine surgery  MRI L sp 4/27 (partially completed): acute L1 compression fx with mild retropulsion. No severe stenosis. post kyphopasty at T11, L3   -Xrays ordered per NSGY to assess for stability; TSLO brace  -Repeat MRI w/ contrast w/ sedation pushed at least until 4/30 to evaluate for pathologic etiology before considering kyphoplasty  -chest CT neg for any definitive malignancy ; has lung nodules - pt rec'd outpt f/u.

## 2024-04-30 NOTE — ASSESSMENT & PLAN NOTE
C diff testing negative  Afebrile; no abx needed at this time  Seems resolved now  CT showing   Liquid stool throughout the large bowel and within several small bowel loops, taken together, suggests diarrheal illness possibly in the setting of enteritis. And Left perinephric fat stranding noting somewhat delayed excretion of contrast by the left kidney.     Imodium prn   probiotic  CRP is downtrending

## 2024-04-30 NOTE — PLAN OF CARE
Problem: Adult Inpatient Plan of Care  Goal: Plan of Care Review  Outcome: Progressing  Goal: Patient-Specific Goal (Individualized)  Outcome: Progressing     Problem: Diabetes Comorbidity  Goal: Blood Glucose Level Within Targeted Range  Outcome: Progressing     Problem: Infection  Goal: Absence of Infection Signs and Symptoms  Outcome: Progressing     Problem: Wound  Goal: Optimal Coping  Outcome: Progressing  Goal: Optimal Pain Control and Function  Outcome: Progressing

## 2024-04-30 NOTE — ANESTHESIA PREPROCEDURE EVALUATION
04/30/2024  Nithin Wagner is a 68 y.o., male.    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    forehead    Stroke     TIA       Past Surgical History:   Procedure Laterality Date    CATARACT EXTRACTION W/  INTRAOCULAR LENS IMPLANT Right 3/28/2024    Procedure: EXTRACTION, CATARACT, WITH IOL INSERTION;  Surgeon: Hans Woodward MD;  Location: [facility] OR;  Service: Ophthalmology;  Laterality: Right;    CORONARY ANGIOGRAPHY N/A 3/10/2021    Procedure: ANGIOGRAM, CORONARY ARTERY - right radial;  Surgeon: Shemar Dempsey MD;  Location: [facility] CATH LAB;  Service: Cardiology;  Laterality: N/A;    CORONARY STENT PLACEMENT  03/10/2021    prox-mid RCA Marshal 4.5 x 26 mm, 4.5 x 12 mm    FIXATION KYPHOPLASTY N/A 4/22/2024    Procedure: KYPHOPLASTY;  Surgeon: Stefan Stern MD;  Location: [facility] OR;  Service: Pain Management;  Laterality: N/A;  NO PROPOFOL    INCISION AND DRAINAGE OF SCROTUM N/A 11/15/2022    Procedure: INCISION AND DRAINAGE, SCROTUM;  Surgeon: William Hatch MD;  Location: [facility] OR;  Service: Urology;  Laterality: N/A;    INCISION AND DRAINAGE OF SCROTUM N/A 11/17/2022    Procedure: INCISION AND DRAINAGE, SCROTUM;  Surgeon: William Hatch MD;  Location: [facility] OR;  Service: Urology;  Laterality: N/A;    LUNG LOBECTOMY Right 2008    RUL lobectomy after removal of atrial myxoma    PLEURA BIOPSY      RESECTION OF ATRIAL MYXOMA  2007           Pre-op Assessment          Review of Systems  Anesthesia Hx:  No problems with  previous Anesthesia   History of prior surgery of interest to airway management or planning:          Denies Family Hx of Anesthesia complications.    Denies Personal Hx of Anesthesia complications.                    Cardiovascular:     Hypertension   CAD                                        Pulmonary:  Pulmonary Normal                       Neurological:   CVA, no residual symptoms                                        Physical Exam  General: Alert, Oriented and Well nourished    Airway:  Mallampati: II   Mouth Opening: Normal  TM Distance: Normal  Neck ROM: Normal ROM    Dental:  Intact    Chest/Lungs:  Normal Respiratory Rate    Heart:  Rate: Normal  Rhythm: Regular Rhythm        Anesthesia Plan  Type of Anesthesia, risks & benefits discussed:    Anesthesia Type: Gen Natural Airway, MAC  Intra-op Monitoring Plan: Standard ASA Monitors  Post Op Pain Control Plan: multimodal analgesia and IV/PO Opioids PRN  Informed Consent: Informed consent signed with the Patient and all parties understand the risks and agree with anesthesia plan.  All questions answered.   ASA Score: 3  Day of Surgery Review of History & Physical: H&P Update referred to the surgeon/provider.    Ready For Surgery From Anesthesia Perspective.     .

## 2024-04-30 NOTE — NURSING
.Nurses Note -- 4 Eyes      4/29/2024  1900      Skin assessed during: Daily Assessment      [] No Altered Skin Integrity Present    []Prevention Measures Documented      [x] Yes- Altered Skin Integrity Present or Discovered   [x] LDA Added if Not in Epic (posterior back healing surgical incision)   [] New Altered Skin Integrity was Present on Admit and Documented in LDA   [] Wound Image Taken    Wound Care Consulted? No    Attending Nurse:  Angeles Owens RN/Staff Member: Timoteo

## 2024-04-30 NOTE — SUBJECTIVE & OBJECTIVE
Interval History:  pt still has abd and back pain. Afebrile; per RN no acute issues overnight. Awaiting brace and sedated MRI    Review of Systems  Objective:     Vital Signs (Most Recent):  Temp: 98.5 °F (36.9 °C) (04/30/24 0731)  Pulse: 108 (04/30/24 0731)  Resp: 17 (04/30/24 0830)  BP: 133/62 (04/30/24 0731)  SpO2: (!) 93 % (04/30/24 0731) Vital Signs (24h Range):  Temp:  [97.6 °F (36.4 °C)-98.5 °F (36.9 °C)] 98.5 °F (36.9 °C)  Pulse:  [105-119] 108  Resp:  [16-19] 17  SpO2:  [92 %-94 %] 93 %  BP: (122-142)/(56-78) 133/62     Weight: 92.6 kg (204 lb 2.3 oz)  Body mass index is 28.47 kg/m².    Intake/Output Summary (Last 24 hours) at 4/30/2024 1027  Last data filed at 4/30/2024 0043  Gross per 24 hour   Intake 350 ml   Output 600 ml   Net -250 ml         Physical Exam      Clear lungs BL, unlabored breathing, on RA, no cyanosis  Hrt sounds RRR  AA, NAD  Moves BL ext's, hips currently flexed  No LE edema  No facial droop; no slurred speech  Abd soft, ND, mild diffuse abd TTP

## 2024-05-01 LAB
BACTERIA BLD CULT: NORMAL
BACTERIA BLD CULT: NORMAL
POCT GLUCOSE: 132 MG/DL (ref 70–110)
POCT GLUCOSE: 189 MG/DL (ref 70–110)
POCT GLUCOSE: 203 MG/DL (ref 70–110)
POCT GLUCOSE: 276 MG/DL (ref 70–110)

## 2024-05-01 PROCEDURE — 97116 GAIT TRAINING THERAPY: CPT

## 2024-05-01 PROCEDURE — 97110 THERAPEUTIC EXERCISES: CPT

## 2024-05-01 PROCEDURE — 25000003 PHARM REV CODE 250: Performed by: INTERNAL MEDICINE

## 2024-05-01 PROCEDURE — 25000003 PHARM REV CODE 250: Performed by: HOSPITALIST

## 2024-05-01 PROCEDURE — 63600175 PHARM REV CODE 636 W HCPCS

## 2024-05-01 PROCEDURE — 63600175 PHARM REV CODE 636 W HCPCS: Performed by: HOSPITALIST

## 2024-05-01 PROCEDURE — 63600175 PHARM REV CODE 636 W HCPCS: Performed by: INTERNAL MEDICINE

## 2024-05-01 PROCEDURE — 11000001 HC ACUTE MED/SURG PRIVATE ROOM

## 2024-05-01 PROCEDURE — 25000003 PHARM REV CODE 250

## 2024-05-01 PROCEDURE — 97162 PT EVAL MOD COMPLEX 30 MIN: CPT

## 2024-05-01 PROCEDURE — 99233 SBSQ HOSP IP/OBS HIGH 50: CPT | Mod: ,,,

## 2024-05-01 RX ORDER — GABAPENTIN 400 MG/1
400 CAPSULE ORAL 3 TIMES DAILY
Status: DISCONTINUED | OUTPATIENT
Start: 2024-05-01 | End: 2024-05-08 | Stop reason: HOSPADM

## 2024-05-01 RX ORDER — ACETAMINOPHEN 500 MG
1000 TABLET ORAL EVERY 8 HOURS
Status: DISCONTINUED | OUTPATIENT
Start: 2024-05-01 | End: 2024-05-08 | Stop reason: HOSPADM

## 2024-05-01 RX ORDER — OXYCODONE HYDROCHLORIDE 10 MG/1
10 TABLET ORAL EVERY 4 HOURS PRN
Status: DISCONTINUED | OUTPATIENT
Start: 2024-05-01 | End: 2024-05-08 | Stop reason: HOSPADM

## 2024-05-01 RX ORDER — OXYCODONE HYDROCHLORIDE 5 MG/1
5 TABLET ORAL EVERY 4 HOURS PRN
Status: DISCONTINUED | OUTPATIENT
Start: 2024-05-01 | End: 2024-05-08 | Stop reason: HOSPADM

## 2024-05-01 RX ORDER — HYDROMORPHONE HYDROCHLORIDE 1 MG/ML
0.5 INJECTION, SOLUTION INTRAMUSCULAR; INTRAVENOUS; SUBCUTANEOUS EVERY 4 HOURS PRN
Status: DISCONTINUED | OUTPATIENT
Start: 2024-05-01 | End: 2024-05-01

## 2024-05-01 RX ORDER — HYDROMORPHONE HYDROCHLORIDE 1 MG/ML
0.5 INJECTION, SOLUTION INTRAMUSCULAR; INTRAVENOUS; SUBCUTANEOUS EVERY 6 HOURS PRN
Status: DISCONTINUED | OUTPATIENT
Start: 2024-05-01 | End: 2024-05-03

## 2024-05-01 RX ADMIN — OXYCODONE HYDROCHLORIDE 10 MG: 10 TABLET ORAL at 03:05

## 2024-05-01 RX ADMIN — OXYCODONE HYDROCHLORIDE 10 MG: 10 TABLET ORAL at 11:05

## 2024-05-01 RX ADMIN — HYDROMORPHONE HYDROCHLORIDE 0.5 MG: 1 INJECTION, SOLUTION INTRAMUSCULAR; INTRAVENOUS; SUBCUTANEOUS at 05:05

## 2024-05-01 RX ADMIN — METHOCARBAMOL 1000 MG: 100 INJECTION, SOLUTION INTRAMUSCULAR; INTRAVENOUS at 03:05

## 2024-05-01 RX ADMIN — INSULIN ASPART 1 UNITS: 100 INJECTION, SOLUTION INTRAVENOUS; SUBCUTANEOUS at 09:05

## 2024-05-01 RX ADMIN — OXYCODONE HYDROCHLORIDE 10 MG: 10 TABLET ORAL at 09:05

## 2024-05-01 RX ADMIN — ESCITALOPRAM OXALATE 20 MG: 20 TABLET, FILM COATED ORAL at 09:05

## 2024-05-01 RX ADMIN — DIGOXIN 0.12 MG: 125 TABLET ORAL at 08:05

## 2024-05-01 RX ADMIN — Medication 1 CAPSULE: at 08:05

## 2024-05-01 RX ADMIN — GABAPENTIN 400 MG: 400 CAPSULE ORAL at 09:05

## 2024-05-01 RX ADMIN — HYDROMORPHONE HYDROCHLORIDE 1 MG: 1 INJECTION, SOLUTION INTRAMUSCULAR; INTRAVENOUS; SUBCUTANEOUS at 08:05

## 2024-05-01 RX ADMIN — ENOXAPARIN SODIUM 40 MG: 40 INJECTION SUBCUTANEOUS at 05:05

## 2024-05-01 RX ADMIN — ACETAMINOPHEN 1000 MG: 500 TABLET ORAL at 09:05

## 2024-05-01 RX ADMIN — GABAPENTIN 400 MG: 400 CAPSULE ORAL at 08:05

## 2024-05-01 RX ADMIN — GABAPENTIN 400 MG: 400 CAPSULE ORAL at 03:05

## 2024-05-01 RX ADMIN — METHOCARBAMOL 1000 MG: 100 INJECTION, SOLUTION INTRAMUSCULAR; INTRAVENOUS at 10:05

## 2024-05-01 NOTE — SUBJECTIVE & OBJECTIVE
Interval History: NAEON. TLSO at bedside. Patient able to get out of bed and don brace himself. Reports it helped with his pain. His pain has improved on current regimen. Remains on IV Dilaudid. MRI completed. X-Rays to be done today. Stable L HF 4-/5, affirms this has been presents since before his L3 kyphoplasty. No other complaints.    Medications:  Continuous Infusions:  Current Facility-Administered Medications   Medication Dose Route Frequency Last Rate Last Admin     Scheduled Meds:  Current Facility-Administered Medications   Medication Dose Route Frequency    digoxin  0.125 mg Oral Daily    enoxparin  40 mg Subcutaneous Daily    EScitalopram oxalate  20 mg Oral QHS    gabapentin  400 mg Oral TID    Lactobacillus rhamnosus GG  1 capsule Oral Daily     PRN Meds:  Current Facility-Administered Medications:     dextrose 10%, 12.5 g, Intravenous, PRN    dextrose 10%, 25 g, Intravenous, PRN    glucagon (human recombinant), 1 mg, Intramuscular, PRN    glucose, 16 g, Oral, PRN    glucose, 24 g, Oral, PRN    HYDROmorphone, 1 mg, Intravenous, Q3H PRN    HYDROmorphone, 1 mg, Intravenous, Once PRN    insulin aspart U-100, 0-5 Units, Subcutaneous, QID (AC + HS) PRN    loperamide, 2 mg, Oral, QID PRN    methocarbamoL, 500 mg, Oral, QID PRN    naloxone, 0.02 mg, Intravenous, PRN    ondansetron, 4 mg, Intravenous, Q8H PRN    oxyCODONE, 10 mg, Oral, Q4H PRN    sodium chloride 0.9%, 10 mL, Intravenous, Q12H PRN     Review of Systems  Objective:     Weight: 92.6 kg (204 lb 2.3 oz)  Body mass index is 28.47 kg/m².  Vital Signs (Most Recent):  Temp: 97.6 °F (36.4 °C) (05/01/24 0733)  Pulse: 105 (05/01/24 0733)  Resp: 18 (05/01/24 0844)  BP: (!) 152/82 (05/01/24 0733)  SpO2: (!) 92 % (05/01/24 0733) Vital Signs (24h Range):  Temp:  [97.3 °F (36.3 °C)-99.5 °F (37.5 °C)] 97.6 °F (36.4 °C)  Pulse:  [101-111] 105  Resp:  [15-29] 18  SpO2:  [90 %-99 %] 92 %  BP: (121-156)/(56-85) 152/82           Neurosurgery Physical Exam  General:  "well developed, well nourished, no distress.   HEENT: normocephalic, atraumatic  CV: regular rate   Pulmonary: normal respirations, no signs of respiratory distress  Abdomen: soft, non-distended, not tender to palpation  Skin: Skin is warm, dry and intact  Heme: no bruising     Neuro:   Mental Status: AO x4  CN II-XII intact  Sensory: intact to light touch throughout  Motor: 4-/5 in R HF, otherwise 5/5 throughout  Reflexes: -Brewer's, -Babinski, no clonus. Patellar: 2+ bilaterally   Gait: deferred  Back: +tenderness to palpation along spinous processes in lumbar region    Significant Labs:  No results for input(s): "GLU", "NA", "K", "CL", "CO2", "BUN", "CREATININE", "CALCIUM", "MG" in the last 48 hours.  No results for input(s): "WBC", "HGB", "HCT", "PLT" in the last 48 hours.  No results for input(s): "LABPT", "INR", "APTT" in the last 48 hours.  Microbiology Results (last 7 days)       Procedure Component Value Units Date/Time    Blood Culture #2 **CANNOT BE ORDERED STAT** [[id number]] Collected: 04/26/24 1712    Order Status: Completed Specimen: Blood from Peripheral, Antecubital, Right Updated: 04/30/24 1812     Blood Culture, Routine No Growth to date      No Growth to date      No Growth to date      No Growth to date      No Growth to date    Blood Culture #1 **CANNOT BE ORDERED STAT** [[id number]] Collected: 04/26/24 1712    Order Status: Completed Specimen: Blood from Peripheral, Forearm, Left Updated: 04/30/24 1812     Blood Culture, Routine No Growth to date      No Growth to date      No Growth to date      No Growth to date      No Growth to date    Clostridium difficile EIA [[id number]] Collected: 04/26/24 2317    Order Status: Completed Specimen: Stool Updated: 04/27/24 1140     C. diff Antigen Negative     C difficile Toxins A+B, EIA Negative     Comment: Testing not recommended for children <24 months old.             All pertinent labs from the last 24 hours have been reviewed.    Significant " Diagnostics:  I have reviewed and interpreted all pertinent imaging results/findings within the past 24 hours.

## 2024-05-01 NOTE — PLAN OF CARE
Problem: Physical Therapy  Goal: Physical Therapy Goal  Description: Goals to be met by: 24     Patient will increase functional independence with mobility by performin. Sit to stand transfer with Gila  2. Bed to chair transfer with Gila using Rolling Walker  3. Gait  x 150 feet with Supervision using Rolling Walker.   4. Ascend/descend 12 stair with bilateral Handrails Supervision using Single-point Cane .   5. Stand for 10 minutes with Supervision using Rolling Walker  6. Increased functional strength to WNL for ambulation and stairs to improve ambulation quality and decrease LLE buckling.    Outcome: Progressing   PT eval completed and goals established. Pt will begin PT POC, progressing as tolerated.

## 2024-05-01 NOTE — ASSESSMENT & PLAN NOTE
Mr. Wagner is a 68M s/p T11 and L3 kyphoplasty with pain management Dr. Aviles on 4/22/2024, now with worsening back pain and diarrhea. Has had multiple ED visits in the past week, CT A/P and MRI show new acute compression fx of L1 with mild retropulsion. In significant pain but remains neuro intact.    MRI L sp 4/27 (partially completed): acute L1 compression fx with mild retropulsion. No severe stenosis. S/p kyphopasty at T11, L3.    - Admitted to medicine for pain control. Recommend multimodal pain control with narcotics, tylenol/NSAIDs, muscle relaxants. Wean IV Dilaudid off.  - Recommend TLSO brace when upright/OOB. X-Ray lumbar lateral supine and upright to assess stability of fracture in the brace is pending.  - MRI L sp w/wo to assess for possible pathologic fx. No clear evidence of pathological fx. Redemonstrated acute-subacute L1 compression fx with 6 mm retropulsion cuasing mild canal stenosis.  - CT chest malignancy workup completed, R upper lobe opacity. Read reports malignancy less likely.  - Will consider L1 kyphoplasty for ongoing pain in the future. Patient not interested at this time.  - Optimization of bone quality per primary team and endocrine. H/o osteopenia on DXA scan from 2023.

## 2024-05-01 NOTE — PLAN OF CARE
Problem: Adult Inpatient Plan of Care  Goal: Plan of Care Review  Outcome: Progressing  Goal: Optimal Comfort and Wellbeing  Outcome: Progressing  Goal: Readiness for Transition of Care  Outcome: Progressing     Problem: Diabetes Comorbidity  Goal: Blood Glucose Level Within Targeted Range  Outcome: Progressing     Problem: Wound  Goal: Optimal Coping  Outcome: Progressing

## 2024-05-01 NOTE — PT/OT/SLP EVAL
"Physical Therapy Evaluation    Patient Name:  Nithin Wagner   MRN:  [id number]    Recommendations:     Discharge Recommendations: Moderate Intensity Therapy   Discharge Equipment Recommendations: walker, rolling   Barriers to discharge: Inaccessible home shower on 2nd floor    Assessment:     Nithin Wagner is a 68 y.o. male admitted with a medical diagnosis of Intractable pain.  He presents with the following impairments/functional limitations: weakness, impaired endurance, impaired functional mobility, impaired self care skills, gait instability, decreased lower extremity function, pain, orthopedic precautions. Patient required increased time however no assistance for bed mobility or sit to stand transfers. He demonstrated unsteady ambulation with SPC and Shani, progressing to CGA with 2ww and improved gait mechanics. Patient demonstrated safe technique for stair navigation and is largely limited by weakness and activity tolerance, requiring rest break following 25' ambulation and unable to complete full flight of stairs. Patient was previously independent prior to decline in mobility over several weeks and currently requires assistance from nephew. Recommending moderate intensity post-acute therapy to progress mobility and improve safety and independence.      Rehab Prognosis: Good; patient would benefit from acute skilled PT services to address these deficits and reach maximum level of function.    Recent Surgery: Procedure(s) (LRB):  MRI (Magnetic Resonance Imagine) (N/A) 1 Day Post-Op    Plan:     During this hospitalization, patient to be seen 3 x/week to address the identified rehab impairments via gait training, therapeutic activities, therapeutic exercises, neuromuscular re-education, wheelchair management/training and progress toward the following goals:    Plan of Care Expires:  06/01/24    Subjective     Chief Complaint: pain  Patient/Family Comments/goals: "I lost 40 lbs and got a lot weaker!" "   Pain/Comfort:  Pain Rating 1: 7/10  Location - Side 1: Bilateral  Location - Orientation 1: medial  Location 1: back  Pain Addressed 1: Pre-medicate for activity, Distraction  Pain Rating Post-Intervention 1: 7/10    Patients cultural, spiritual, Mu-ism conflicts given the current situation: no    Living Environment:  Patient lives alone in 2SH with bed and bathroom on 2nd floor, 4STE with BHR. Reports he will be staying on 1st floor if necessary, only 1/2 bath on first floor. Upstairs bathroom has walk-in tub shower combo with chair. Nephew stays with patient reportedly 50% of the time.   Prior to admission, patients level of function was requiring assistance with all ADLs for several weeks, prior was independent.  Equipment used at home: cane, straight, rollator cane in home, folding rollator in community.  DME owned (not currently used): none.  Upon discharge, patient will have assistance from nephew.    Objective:     Communicated with RN prior to session.  Patient found HOB elevated with Other (comments) (no active lines)  upon PT entry to room.    General Precautions: Standard, fall  Orthopedic Precautions:spinal precautions   Braces: TLSO  Respiratory Status: Room air    Exams:  Cognitive Exam:  Patient is oriented to Person, Place, Time, and Situation  Sensation:    -       Intact  RLE ROM: WNL  RLE Strength: 4+/5 Knee extension, ankle DF/PF    LLE ROM: WNL  LLE Strength: 4+/5 Knee extension, ankle DF/PF      Functional Mobility:  Bed Mobility:     Rolling Left:  stand by assistance  Scooting: stand by assistance  Supine to Sit: stand by assistance  Sit to Supine: stand by assistance  Transfers:     Sit to Stand:  contact guard assistance with straight cane  Gait:   Patient ambulated 10' with straight cane (RUE) and Shani for balance. Patient reaching out to external support surfaces.   Patient ambulated 15' with 2ww CGA, required rest break following with increased respiration rate   Deviations Noted:  decreased gait speed, decreased step height R,L, and decreased step length R, L patient reports LLE buckling at home, no buckling noted during session  Balance:   Sitting static: independent   Sitting dynamic: SBA  Donning brace: CGA with Verbal and tactile cues   Standing static: CGA  Standing dynamic: Shani   Stairs:  patient navigated 4 stairs with CGA and straight cane in RUE  Ascent: step to R, LHR  Descent: step to L, LHR       AM-PAC 6 CLICK MOBILITY  Total Score:22       Treatment & Education:  Patient educated on components of brace, appropriate times to don/doff, verbal cues and [facility] guidance for donning/doffing brace.  Verbal and tactile cues provided during mobility for walker management and safety including hand placement on walker vs chair/bed during transfers and positioning of walker in relation to body.  Patient demonstrated safe technique.   Education and demonstration of safe stair navigation with step to pattern/BUE railing use for improved pain, exercise tolerance, balance, and to address overall safety. Patient demonstrating good technique.   Education: patient educated on POC, need for therapy, safety with mobility, discharge recommendations and equipment recommendations. Patient verbalized understanding of all topics.      Patient left HOB elevated with all lines intact and call button in reach.    GOALS:   Multidisciplinary Problems       Physical Therapy Goals          Problem: Physical Therapy    Goal Priority Disciplines Outcome Goal Variances Interventions   Physical Therapy Goal     PT, PT/OT Progressing     Description: Goals to be met by: 24     Patient will increase functional independence with mobility by performin. Sit to stand transfer with [place]  2. Bed to chair transfer with [place] using Rolling Walker  3. Gait  x 150 feet with Supervision using Rolling Walker.   4. Ascend/descend 12 stair with bilateral Handrails Supervision using Single-point Cane .    5. Stand for 10 minutes with Supervision using Rolling Walker  6. Increased functional strength to WNL for ambulation and stairs to improve ambulation quality and decrease LLE buckling.                         History:     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    forehead    Stroke     TIA       Past Surgical History:   Procedure Laterality Date    CATARACT EXTRACTION W/  INTRAOCULAR LENS IMPLANT Right 3/28/2024    Procedure: EXTRACTION, CATARACT, WITH IOL INSERTION;  Surgeon: Hans Woodward MD;  Location: [facility] OR;  Service: Ophthalmology;  Laterality: Right;    CORONARY ANGIOGRAPHY N/A 3/10/2021    Procedure: ANGIOGRAM, CORONARY ARTERY - right radial;  Surgeon: Shemar Dempsey MD;  Location: [facility] CATH LAB;  Service: Cardiology;  Laterality: N/A;    CORONARY STENT PLACEMENT  03/10/2021    prox-mid RCA Marshal 4.5 x 26 mm, 4.5 x 12 mm    FIXATION KYPHOPLASTY N/A 4/22/2024    Procedure: KYPHOPLASTY;  Surgeon: Stefan Stern MD;  Location: [facility] OR;  Service: Pain Management;  Laterality: N/A;  NO PROPOFOL    INCISION AND DRAINAGE OF SCROTUM N/A 11/15/2022    Procedure: INCISION AND DRAINAGE, SCROTUM;  Surgeon: William Hatch MD;  Location: [facility] OR;  Service: Urology;  Laterality: N/A;    INCISION AND DRAINAGE OF SCROTUM N/A 11/17/2022    Procedure: INCISION AND DRAINAGE, SCROTUM;  Surgeon: William Hatch MD;  Location: [facility] OR;  Service: Urology;  Laterality: N/A;    LUNG LOBECTOMY Right 2008    RUL lobectomy after removal of atrial myxoma    MAGNETIC RESONANCE IMAGING N/A 4/30/2024    Procedure: MRI (Magnetic  Resonance Imagine);  Surgeon: Peace Watts;  Location: [facility];  Service: Anesthesiology;  Laterality: N/A;    PLEURA BIOPSY      RESECTION OF ATRIAL MYXOMA  2007       Time Tracking:     PT Received On: 05/01/24  PT Start Time: 1036     PT Stop Time: 1109  PT Total Time (min): 33 min     Billable Minutes: Evaluation 8 minutes, Gait Training 8 minutes, and Therapeutic Exercise 17 minutes      05/01/2024

## 2024-05-01 NOTE — PLAN OF CARE
05/01/24 1520   Post-Acute Status   Post-Acute Authorization Placement   Post-Acute Placement Status Patient List Provided   Discharge Plan   Discharge Plan A Skilled Nursing Facility   Discharge Plan B Home Health       CM met with patient at beside regarding discharge plans. Patient is agreeable to go to a facility post discharge. Therapy rec's for mod intensity. Notified patient I would provide him with a list of SNF's. He stated his great nephew Anjel is POA and CM should give him the list to look over.     CM spoke with Anjel via phone   [phone] and he stated he had left the hospital for the day. CM advised him that list will be left on bedside table. Anjel stated he will review list when he return to the hospital on tomorrow.

## 2024-05-01 NOTE — PLAN OF CARE
CHW met with patient/family at bedside. Patient experience rounding completed and reviewed the following.     Do you know your discharge plan? Yes or No,    If yes, what is the plan?   Yes  Home     Have you discussed your needs and preferences with your SW/CM? Yes     If you are discharging home, do you have help at home? Yes  Patient has help at home.    Do you think you will need help additional at home at discharge?  No   Patient has no need for additional help.    Do you currently have difficulty keeping up with bills, affording medicine or buying food?  No  Patient has help with bills, food, and medicine.    Assigned SW/CM notified of any patient/family needs or concerns. Appropriate resources provided to address patient's needs.    Diane MIGUEL  Case Management  [phone]

## 2024-05-01 NOTE — ASSESSMENT & PLAN NOTE
-Due to new L1 fracture   -Hydromorphone 1 mg IV q4h prn for severe pain  -Oxycodone 10 mg q4h prn for moderate pain   -Per spine surgery  MRI L sp 4/27 (partially completed): acute L1 compression fx with mild retropulsion. No severe stenosis. post kyphopasty at T11, L3   -Xrays pending now TSLO brace  -Repeat MRI w/ contrast w/ sedation neg for path fracture;  -chest CT neg for any definitive malignancy ; has lung nodules - pt rec'd outpt f/u.  -will await therapy session w/ PT to see if pt's pain is controlled or needs kyphoplasty.

## 2024-05-01 NOTE — SUBJECTIVE & OBJECTIVE
"Interval History:  pt says his pain is controlled when he lies still. Says he's "high" on dilaudid right now. Back brace at the bedside now. MRI L spine low suspicion for path fx.    Review of Systems  Objective:     Vital Signs (Most Recent):  Temp: 97.6 °F (36.4 °C) (05/01/24 0733)  Pulse: 105 (05/01/24 0733)  Resp: 18 (05/01/24 0844)  BP: (!) 152/82 (05/01/24 0733)  SpO2: (!) 92 % (05/01/24 0733) Vital Signs (24h Range):  Temp:  [97.3 °F (36.3 °C)-99.5 °F (37.5 °C)] 97.6 °F (36.4 °C)  Pulse:  [101-111] 105  Resp:  [15-29] 18  SpO2:  [90 %-99 %] 92 %  BP: (121-156)/(56-85) 152/82     Weight: 92.6 kg (204 lb 2.3 oz)  Body mass index is 28.47 kg/m².    Intake/Output Summary (Last 24 hours) at 5/1/2024 1051  Last data filed at 4/30/2024 2357  Gross per 24 hour   Intake 440 ml   Output 400 ml   Net 40 ml         Physical Exam      Clear lungs BL, unlabored breathing, on RA, no cyanosis  Hrt sounds RRR  AA, NAD  No facial droop; no slurred speech  Both hips/knees flexed  No LE edema  Lying on left side  "

## 2024-05-01 NOTE — ANESTHESIA POSTPROCEDURE EVALUATION
Anesthesia Post Evaluation    Patient: Nithin Wagner    Procedure(s) Performed: Procedure(s) (LRB):  MRI (Magnetic Resonance Imagine) (N/A)    Final Anesthesia Type: general      Patient location during evaluation: PACU  Patient participation: Yes- Able to Participate  Level of consciousness: awake and alert  Post-procedure vital signs: reviewed and stable  Pain management: adequate  Airway patency: patent    PONV status at discharge: No PONV  Anesthetic complications: no      Cardiovascular status: blood pressure returned to baseline  Respiratory status: unassisted and spontaneous ventilation  Hydration status: euvolemic  Follow-up not needed.              Vitals Value Taken Time   /76 04/30/24 1602   Temp 36.3 °C (97.3 °F) 04/30/24 1630   Pulse 104 04/30/24 1630   Resp 16 04/30/24 1630   SpO2 90 % 04/30/24 1630         No case tracking events are documented in the log.      Pain/Renee Score: Pain Rating Prior to Med Admin: 6 (5/1/2024  3:38 AM)  Pain Rating Post Med Admin: 5 (5/1/2024  4:38 AM)  Renee Score: 9 (4/30/2024  3:45 PM)

## 2024-05-01 NOTE — PROGRESS NOTES
"[facility] ([facility] Medicine  Progress Note    Patient Name: Nithin Wagner  MRN: [id number]  Patient Class: IP- Inpatient   Admission Date: 4/26/2024  Length of Stay: 3 days  Attending Physician: Jose Hagen MD  Primary Care Provider: Tushar Drew MD        Subjective:     Principal Problem:Intractable pain        HPI:  Pt is a 68yoM s/p s/p T11 and L3 kyphoplasty on 4/22/2024, now with worsening back pain and diarrhea. Has had multiple ED visits in the past week, CT A/P done yesterday showed Interval compression deformity involving the superior endplate of L1 as well as enteritis, he was discharged home on cipro/flagyl but never started taking them. Was seen by pain management and recommended admission for intractable pain.     in the ED, vital signs are stable, cbc/cmp unremarkable, ESR/CRP down trending.  patient has no red flags for infection/spinal involvement beyond pain and has reassuring neurological examination.  Patient has  required 2 mg of IV hydromorphone.      At bedside, complained of low back pain, denies worsening numbness in his legs, no fecal/urinary incontinence, no fevers, no night sweats. Complained of watery diarrhea for few days, he states that few of his friends have contracted a diarrheal illness as well. Diarrhea is associated with abdominal pain, no nausea/no vomiting. Hasn't been able to eat well and hasn't taking most of his medications in the past few days.     Overview/Hospital Course:  Per NSGY L-spine MRI (partially completed) read showed   acute L1 compression fx with mild retropulsion. No severe stenosis. post kyphopasty at T11, L3. C diff was negative, started on imodium. Pt reports improvement in diarrhea; 1 loose nonbloody bm since 4 am, not as watery anymore. CT chest neg for any definitive malignancy, has nonspecific lung nodules, outpt f/u.     Interval History:  pt says his pain is controlled when he lies still. Says he's "high" on " dilaudid right now. Back brace at the bedside now. MRI L spine low suspicion for path fx.    Review of Systems  Objective:     Vital Signs (Most Recent):  Temp: 97.6 °F (36.4 °C) (05/01/24 0733)  Pulse: 105 (05/01/24 0733)  Resp: 18 (05/01/24 0844)  BP: (!) 152/82 (05/01/24 0733)  SpO2: (!) 92 % (05/01/24 0733) Vital Signs (24h Range):  Temp:  [97.3 °F (36.3 °C)-99.5 °F (37.5 °C)] 97.6 °F (36.4 °C)  Pulse:  [101-111] 105  Resp:  [15-29] 18  SpO2:  [90 %-99 %] 92 %  BP: (121-156)/(56-85) 152/82     Weight: 92.6 kg (204 lb 2.3 oz)  Body mass index is 28.47 kg/m².    Intake/Output Summary (Last 24 hours) at 5/1/2024 1051  Last data filed at 4/30/2024 2357  Gross per 24 hour   Intake 440 ml   Output 400 ml   Net 40 ml         Physical Exam      Clear lungs BL, unlabored breathing, on RA, no cyanosis  Hrt sounds RRR  AA, NAD  No facial droop; no slurred speech  Both hips/knees flexed  No LE edema  Lying on left side    Assessment/Plan:      * Intractable pain  -Due to new L1 fracture   -Hydromorphone 1 mg IV q4h prn for severe pain  -Oxycodone 10 mg q4h prn for moderate pain   -Per spine surgery  MRI L sp 4/27 (partially completed): acute L1 compression fx with mild retropulsion. No severe stenosis. post kyphopasty at T11, L3   -Xrays pending now TSLO brace  -Repeat MRI w/ contrast w/ sedation neg for path fracture;  -chest CT neg for any definitive malignancy ; has lung nodules - pt rec'd outpt f/u.  -will await therapy session w/ PT to see if pt's pain is controlled or needs kyphoplasty.        Lumbar vertebral fracture  See above      Diarrhea of presumed infectious origin  C diff testing negative  Afebrile; no abx needed at this time  Seems resolved now  CT showing   Liquid stool throughout the large bowel and within several small bowel loops, taken together, suggests diarrheal illness possibly in the setting of enteritis. And Left perinephric fat stranding noting somewhat delayed excretion of contrast by the left  kidney.     Imodium prn   probiotic  CRP is downtrending      Rheumatoid arthritis involving multiple sites with positive rheumatoid factor  Hx of RA on tocilizumab, last in march 2024, supposed to be on lamivudine for hep B ppx    Supraventricular tachycardia  Hx of SVT on digoxin, coreg but hasn't been taking coreg    Continue digoxin for now   Will give IV magnesium 2gm  Optimize potassium > 4    Coronary artery disease  Hx of CAD  Hold ASA for now     Type 2 diabetes mellitus with hyperglycemia, with long-term current use of insulin  Hasn't been taking insulin  Will hold off on scheduled insulin  Monitor glucose AC/HS and utilize prn insulin       Chronic diastolic heart failure  Hasn't been on lasix   Currently hypovolemic due to diarrhea, s/p 1 L of IVF in the ED  Will encourage PO hydration and utilize IV fluids if needed       Hepatitis B core antibody positive  Patient is supposed to be on ppx with lamivudine but hasn't been taking it for few weeks  Will hold off resuming it for now, needs to be resumed at discharge         VTE Risk Mitigation (From admission, onward)           Ordered     enoxaparin injection 40 mg  Daily         04/27/24 0133     IP VTE HIGH RISK PATIENT  Once         04/27/24 0133     Place sequential compression device  Until discontinued         04/27/24 0133                    Discharge Planning   ALEXANDER: 5/1/2024     Code Status: Full Code   Is the patient medically ready for discharge?:     Reason for patient still in hospital (select all that apply): Treatment, Imaging, Consult recommendations, and PT / OT recommendations  Discharge Plan A: Home Health                  Jose Hagen MD  Department of Hospital Medicine   [facility] ([facility])

## 2024-05-01 NOTE — NURSING
.Nurses Note -- 4 Eyes      4/30/2024  1900      Skin assessed during: Daily Assessment      [] No Altered Skin Integrity Present    []Prevention Measures Documented      [x] Yes- Altered Skin Integrity Present or Discovered   [x] LDA Added if Not in Epic (surgical incision (lap sites) to back area)   [] New Altered Skin Integrity was Present on Admit and Documented in LDA   [] Wound Image Taken    Wound Care Consulted? No    Attending Nurse:  Angeles Owens RN/Staff Member:  Kristi

## 2024-05-01 NOTE — PROGRESS NOTES
Devin Rock - Neurosurgery ([facility])  Neurosurgery  Progress Note    Subjective:     History of Present Illness: Mr. Wagner is a 68M s/p T11 and L3 kyphoplasty with pain management Dr. Aviles on 4/22/2024, now with worsening back pain and diarrhea. Has had multiple ED visits in the past week, CT A/P and MRI show new acute compression fx of L1 with mild retropulsion. Patient denies any recent trauma or falls. Has severe axial low back pain, no radicular pain. Able to walk with cane, but has severe pain with any movements, is most comfortable lying still in bed. No weakness, numbness, or bladder sx. Has diarrhea, on c diff rule out. Does not yet have TLSO brace.     Post-Op Info:  Procedure(s) (LRB):  MRI (Magnetic Resonance Imagine) (N/A)   1 Day Post-Op   Interval History: NAEON. TLSO at bedside. Patient able to get out of bed and don brace himself. Reports it helped with his pain. His pain has improved on current regimen. Remains on IV Dilaudid. MRI completed. X-Rays to be done today. Stable L HF 4-/5, affirms this has been presents since before his L3 kyphoplasty. No other complaints.    Medications:  Continuous Infusions:  Current Facility-Administered Medications   Medication Dose Route Frequency Last Rate Last Admin     Scheduled Meds:  Current Facility-Administered Medications   Medication Dose Route Frequency    digoxin  0.125 mg Oral Daily    enoxparin  40 mg Subcutaneous Daily    EScitalopram oxalate  20 mg Oral QHS    gabapentin  400 mg Oral TID    Lactobacillus rhamnosus GG  1 capsule Oral Daily     PRN Meds:  Current Facility-Administered Medications:     dextrose 10%, 12.5 g, Intravenous, PRN    dextrose 10%, 25 g, Intravenous, PRN    glucagon (human recombinant), 1 mg, Intramuscular, PRN    glucose, 16 g, Oral, PRN    glucose, 24 g, Oral, PRN    HYDROmorphone, 1 mg, Intravenous, Q3H PRN    HYDROmorphone, 1 mg, Intravenous, Once PRN    insulin aspart U-100, 0-5 Units, Subcutaneous, QID (AC + HS) PRN     "loperamide, 2 mg, Oral, QID PRN    methocarbamoL, 500 mg, Oral, QID PRN    naloxone, 0.02 mg, Intravenous, PRN    ondansetron, 4 mg, Intravenous, Q8H PRN    oxyCODONE, 10 mg, Oral, Q4H PRN    sodium chloride 0.9%, 10 mL, Intravenous, Q12H PRN     Review of Systems  Objective:     Weight: 92.6 kg (204 lb 2.3 oz)  Body mass index is 28.47 kg/m².  Vital Signs (Most Recent):  Temp: 97.6 °F (36.4 °C) (05/01/24 0733)  Pulse: 105 (05/01/24 0733)  Resp: 18 (05/01/24 0844)  BP: (!) 152/82 (05/01/24 0733)  SpO2: (!) 92 % (05/01/24 0733) Vital Signs (24h Range):  Temp:  [97.3 °F (36.3 °C)-99.5 °F (37.5 °C)] 97.6 °F (36.4 °C)  Pulse:  [101-111] 105  Resp:  [15-29] 18  SpO2:  [90 %-99 %] 92 %  BP: (121-156)/(56-85) 152/82           Neurosurgery Physical Exam  General: well developed, well nourished, no distress.   HEENT: normocephalic, atraumatic  CV: regular rate   Pulmonary: normal respirations, no signs of respiratory distress  Abdomen: soft, non-distended, not tender to palpation  Skin: Skin is warm, dry and intact  Heme: no bruising     Neuro:   Mental Status: AO x4  CN II-XII intact  Sensory: intact to light touch throughout  Motor: 4-/5 in R HF, otherwise 5/5 throughout  Reflexes: -Brewer's, -Babinski, no clonus. Patellar: 2+ bilaterally   Gait: deferred  Back: +tenderness to palpation along spinous processes in lumbar region    Significant Labs:  No results for input(s): "GLU", "NA", "K", "CL", "CO2", "BUN", "CREATININE", "CALCIUM", "MG" in the last 48 hours.  No results for input(s): "WBC", "HGB", "HCT", "PLT" in the last 48 hours.  No results for input(s): "LABPT", "INR", "APTT" in the last 48 hours.  Microbiology Results (last 7 days)       Procedure Component Value Units Date/Time    Blood Culture #2 **CANNOT BE ORDERED STAT** [[id number]] Collected: 04/26/24 1712    Order Status: Completed Specimen: Blood from Peripheral, Antecubital, Right Updated: 04/30/24 1812     Blood Culture, Routine No Growth to date      No " Growth to date      No Growth to date      No Growth to date      No Growth to date    Blood Culture #1 **CANNOT BE ORDERED STAT** [[id number]] Collected: 04/26/24 1712    Order Status: Completed Specimen: Blood from Peripheral, Forearm, Left Updated: 04/30/24 1812     Blood Culture, Routine No Growth to date      No Growth to date      No Growth to date      No Growth to date      No Growth to date    Clostridium difficile EIA [[id number]] Collected: 04/26/24 2317    Order Status: Completed Specimen: Stool Updated: 04/27/24 1140     C. diff Antigen Negative     C difficile Toxins A+B, EIA Negative     Comment: Testing not recommended for children <24 months old.             All pertinent labs from the last 24 hours have been reviewed.    Significant Diagnostics:  I have reviewed and interpreted all pertinent imaging results/findings within the past 24 hours.  Assessment/Plan:     Lumbar vertebral fracture  Mr. Wagner is a 68M s/p T11 and L3 kyphoplasty with pain management Dr. Aviles on 4/22/2024, now with worsening back pain and diarrhea. Has had multiple ED visits in the past week, CT A/P and MRI show new acute compression fx of L1 with mild retropulsion. In significant pain but remains neuro intact.    MRI L sp 4/27 (partially completed): acute L1 compression fx with mild retropulsion. No severe stenosis. S/p kyphopasty at T11, L3.    - Admitted to medicine for pain control. Recommend multimodal pain control with narcotics, tylenol/NSAIDs, muscle relaxants. Wean IV Dilaudid off.  - Recommend TLSO brace when upright/OOB. X-Ray lumbar lateral supine and upright to assess stability of fracture in the brace is pending.  - MRI L sp w/wo to assess for possible pathologic fx. No clear evidence of pathological fx. Redemonstrated acute-subacute L1 compression fx with 6 mm retropulsion cuasing mild canal stenosis.  - CT chest malignancy workup completed, R upper lobe opacity. Read reports malignancy less likely.  - Will  consider L1 kyphoplasty for ongoing pain in the future. Patient not interested at this time.  - Optimization of bone quality per primary team and endocrine. H/o osteopenia on DXA scan from 2023.            Alison oHff PA-C  Neurosurgery  [facility] Rock - Neurosurgery ([facility])

## 2024-05-01 NOTE — PLAN OF CARE
Problem: Adult Inpatient Plan of Care  Goal: Plan of Care Review  Outcome: Progressing  Goal: Patient-Specific Goal (Individualized)  Outcome: Progressing     Problem: Diabetes Comorbidity  Goal: Blood Glucose Level Within Targeted Range  Outcome: Progressing     Problem: Infection  Goal: Absence of Infection Signs and Symptoms  Outcome: Progressing     Problem: Wound  Goal: Optimal Coping  Outcome: Progressing  Goal: Skin Health and Integrity  Outcome: Progressing     Pt resting quietly cont with c/o generalized back pain with pain medication given as needed when scheduled. TLSO brace to be worn while out of bed with pt acknowledging understanding.

## 2024-05-02 PROBLEM — K59.00 CONSTIPATION: Status: ACTIVE | Noted: 2024-05-02

## 2024-05-02 PROBLEM — S22.019A: Status: RESOLVED | Noted: 2024-04-30 | Resolved: 2024-05-02

## 2024-05-02 LAB
POCT GLUCOSE: 120 MG/DL (ref 70–110)
POCT GLUCOSE: 171 MG/DL (ref 70–110)
POCT GLUCOSE: 181 MG/DL (ref 70–110)
POCT GLUCOSE: 207 MG/DL (ref 70–110)

## 2024-05-02 PROCEDURE — 97535 SELF CARE MNGMENT TRAINING: CPT

## 2024-05-02 PROCEDURE — 25000003 PHARM REV CODE 250: Performed by: INTERNAL MEDICINE

## 2024-05-02 PROCEDURE — 11000001 HC ACUTE MED/SURG PRIVATE ROOM

## 2024-05-02 PROCEDURE — 86580 TB INTRADERMAL TEST: CPT | Performed by: HOSPITALIST

## 2024-05-02 PROCEDURE — 63600175 PHARM REV CODE 636 W HCPCS: Performed by: HOSPITALIST

## 2024-05-02 PROCEDURE — 63600175 PHARM REV CODE 636 W HCPCS

## 2024-05-02 PROCEDURE — 97530 THERAPEUTIC ACTIVITIES: CPT | Mod: CQ

## 2024-05-02 PROCEDURE — 97165 OT EVAL LOW COMPLEX 30 MIN: CPT

## 2024-05-02 PROCEDURE — 25000003 PHARM REV CODE 250: Performed by: HOSPITALIST

## 2024-05-02 PROCEDURE — 63600175 PHARM REV CODE 636 W HCPCS: Performed by: INTERNAL MEDICINE

## 2024-05-02 PROCEDURE — 25000003 PHARM REV CODE 250

## 2024-05-02 PROCEDURE — 30200315 PPD INTRADERMAL TEST REV CODE 302: Performed by: HOSPITALIST

## 2024-05-02 RX ORDER — LIDOCAINE 50 MG/G
1 PATCH TOPICAL
Status: DISCONTINUED | OUTPATIENT
Start: 2024-05-02 | End: 2024-05-08 | Stop reason: HOSPADM

## 2024-05-02 RX ORDER — POLYETHYLENE GLYCOL 3350 17 G/17G
17 POWDER, FOR SOLUTION ORAL 2 TIMES DAILY
Status: DISCONTINUED | OUTPATIENT
Start: 2024-05-02 | End: 2024-05-08 | Stop reason: HOSPADM

## 2024-05-02 RX ORDER — LIDOCAINE 50 MG/G
1 PATCH TOPICAL DAILY
Qty: 30 PATCH | Refills: 2 | Status: CANCELLED | OUTPATIENT
Start: 2024-05-02

## 2024-05-02 RX ADMIN — HYDROMORPHONE HYDROCHLORIDE 0.5 MG: 1 INJECTION, SOLUTION INTRAMUSCULAR; INTRAVENOUS; SUBCUTANEOUS at 10:05

## 2024-05-02 RX ADMIN — OXYCODONE HYDROCHLORIDE 10 MG: 10 TABLET ORAL at 04:05

## 2024-05-02 RX ADMIN — LIDOCAINE 1 PATCH: 50 PATCH CUTANEOUS at 04:05

## 2024-05-02 RX ADMIN — ACETAMINOPHEN 1000 MG: 500 TABLET ORAL at 06:05

## 2024-05-02 RX ADMIN — ACETAMINOPHEN 1000 MG: 500 TABLET ORAL at 09:05

## 2024-05-02 RX ADMIN — ACETAMINOPHEN 1000 MG: 500 TABLET ORAL at 01:05

## 2024-05-02 RX ADMIN — HYDROMORPHONE HYDROCHLORIDE 0.5 MG: 1 INJECTION, SOLUTION INTRAMUSCULAR; INTRAVENOUS; SUBCUTANEOUS at 04:05

## 2024-05-02 RX ADMIN — GABAPENTIN 400 MG: 400 CAPSULE ORAL at 09:05

## 2024-05-02 RX ADMIN — OXYCODONE HYDROCHLORIDE 10 MG: 10 TABLET ORAL at 12:05

## 2024-05-02 RX ADMIN — Medication 1 CAPSULE: at 09:05

## 2024-05-02 RX ADMIN — OXYCODONE HYDROCHLORIDE 10 MG: 10 TABLET ORAL at 07:05

## 2024-05-02 RX ADMIN — HYDROMORPHONE HYDROCHLORIDE 0.5 MG: 1 INJECTION, SOLUTION INTRAMUSCULAR; INTRAVENOUS; SUBCUTANEOUS at 12:05

## 2024-05-02 RX ADMIN — POLYETHYLENE GLYCOL 3350 17 G: 17 POWDER, FOR SOLUTION ORAL at 12:05

## 2024-05-02 RX ADMIN — OXYCODONE 5 MG: 5 TABLET ORAL at 02:05

## 2024-05-02 RX ADMIN — ESCITALOPRAM OXALATE 20 MG: 20 TABLET, FILM COATED ORAL at 09:05

## 2024-05-02 RX ADMIN — GABAPENTIN 400 MG: 400 CAPSULE ORAL at 04:05

## 2024-05-02 RX ADMIN — METHOCARBAMOL 1000 MG: 100 INJECTION, SOLUTION INTRAMUSCULAR; INTRAVENOUS at 09:05

## 2024-05-02 RX ADMIN — METHOCARBAMOL 1000 MG: 100 INJECTION, SOLUTION INTRAMUSCULAR; INTRAVENOUS at 01:05

## 2024-05-02 RX ADMIN — TUBERCULIN PURIFIED PROTEIN DERIVATIVE 5 UNITS: 5 INJECTION, SOLUTION INTRADERMAL at 02:05

## 2024-05-02 RX ADMIN — METHOCARBAMOL 1000 MG: 100 INJECTION, SOLUTION INTRAMUSCULAR; INTRAVENOUS at 06:05

## 2024-05-02 RX ADMIN — HYDROMORPHONE HYDROCHLORIDE 0.5 MG: 1 INJECTION, SOLUTION INTRAMUSCULAR; INTRAVENOUS; SUBCUTANEOUS at 09:05

## 2024-05-02 RX ADMIN — ENOXAPARIN SODIUM 40 MG: 40 INJECTION SUBCUTANEOUS at 04:05

## 2024-05-02 RX ADMIN — DIGOXIN 0.12 MG: 125 TABLET ORAL at 09:05

## 2024-05-02 NOTE — PLAN OF CARE
Problem: Occupational Therapy  Goal: Occupational Therapy Goal  Description: Goals to be met by: 05-14-24     Patient will increase functional independence with ADLs by performing:    UE Dressing with Set-up Assistance.  LE Dressing with Set-up Assistance with AE  Grooming while standing at sink with Supervision.  Toileting from toilet with Supervision for hygiene and clothing management.   Rolling to Bilateral with [place] using log roll technique  Supine to sit with [place].  Stand pivot transfers with Supervision.  Toilet transfer to toilet with Supervision.  Pt. To be able to recall spinal precautions with 100% accuracy    Outcome: Progressing

## 2024-05-02 NOTE — PLAN OF CARE
Problem: Adult Inpatient Plan of Care  Goal: Optimal Comfort and Wellbeing  Outcome: Progressing  Intervention: Monitor Pain and Promote Comfort  Flowsheets (Taken 5/2/2024 0147)  Pain Management Interventions: care clustered  Intervention: Provide Person-Centered Care  Flowsheets (Taken 5/2/2024 0147)  Trust Relationship/Rapport:   care explained   choices provided   Hydromorphone given x1 and oxycodone given x 2 for pain. VSS. Bed in lowest position, side rails x 3, and call light in use.

## 2024-05-02 NOTE — PLAN OF CARE
142 [facility] to [facility].      Iain Solitario [facility]  Case Management  [phone]

## 2024-05-02 NOTE — PROGRESS NOTES
Devin Rock - Neurosurgery ([facility])  Neurosurgery  Progress Note    Subjective:     History of Present Illness: Mr. Wagner is a 68M s/p T11 and L3 kyphoplasty with pain management Dr. Aviles on 4/22/2024, now with worsening back pain and diarrhea. Has had multiple ED visits in the past week, CT A/P and MRI show new acute compression fx of L1 with mild retropulsion. Patient denies any recent trauma or falls. Has severe axial low back pain, no radicular pain. Able to walk with cane, but has severe pain with any movements, is most comfortable lying still in bed. No weakness, numbness, or bladder sx. Has diarrhea, on c diff rule out. Does not yet have TLSO brace.     Post-Op Info:  Procedure(s) (LRB):  MRI (Magnetic Resonance Imagine) (N/A)   2 Days Post-Op   Interval History: 5/2: NAEO. MRI completed, L1 compression fx with 6mm of retropulsion, with mild canal stenosis. Fracture is stable on upright X-ray. Patient declining kyphoplasty at this time. Will plan to follow up outpatient.     Medications:  Continuous Infusions:  Current Facility-Administered Medications   Medication Dose Route Frequency Last Rate Last Admin     Scheduled Meds:  Current Facility-Administered Medications   Medication Dose Route Frequency    acetaminophen  1,000 mg Oral Q8H    digoxin  0.125 mg Oral Daily    enoxparin  40 mg Subcutaneous Daily    EScitalopram oxalate  20 mg Oral QHS    gabapentin  400 mg Oral TID    Lactobacillus rhamnosus GG  1 capsule Oral Daily    LIDOcaine  1 patch Transdermal Q24H    methocarbamol (ROBAXIN) IVPB  1,000 mg Intravenous Q8H    polyethylene glycol  17 g Oral BID     PRN Meds:  Current Facility-Administered Medications:     dextrose 10%, 12.5 g, Intravenous, PRN    dextrose 10%, 25 g, Intravenous, PRN    glucagon (human recombinant), 1 mg, Intramuscular, PRN    glucose, 16 g, Oral, PRN    glucose, 24 g, Oral, PRN    HYDROmorphone, 0.5 mg, Intravenous, Q6H PRN    insulin aspart U-100, 0-5 Units, Subcutaneous, QID  "(AC + HS) PRN    naloxone, 0.02 mg, Intravenous, PRN    ondansetron, 4 mg, Intravenous, Q8H PRN    oxyCODONE, 5 mg, Oral, Q4H PRN    oxyCODONE, 10 mg, Oral, Q4H PRN    sodium chloride 0.9%, 10 mL, Intravenous, Q12H PRN     Objective:     Weight: 92.6 kg (204 lb 2.3 oz)  Body mass index is 28.47 kg/m².  Vital Signs (Most Recent):  Temp: 98.4 °F (36.9 °C) (05/02/24 1536)  Pulse: 94 (05/02/24 1536)  Resp: 18 (05/02/24 1638)  BP: (!) 141/84 (05/02/24 1536)  SpO2: (!) 93 % (05/02/24 1536) Vital Signs (24h Range):  Temp:  [97.6 °F (36.4 °C)-98.8 °F (37.1 °C)] 98.4 °F (36.9 °C)  Pulse:  [] 94  Resp:  [16-24] 18  SpO2:  [91 %-93 %] 93 %  BP: (113-157)/(70-86) 141/84     Date 05/02/24 0700 - 05/03/24 0659   Shift 6749-1328 0064-0107 1495-2966 24 Hour Total   INTAKE   Shift Total(mL/kg)       OUTPUT   Urine(mL/kg/hr) 350(0.5) 450  800   Shift Total(mL/kg) 350(3.8) 450(4.9)  800(8.6)   Weight (kg) 92.6 92.6 92.6 92.6      Physical Exam  General: well developed, well nourished, no distress.   Head: normocephalic, atraumatic  Mental Status: Awake, Alert, Oriented  Speech: Clear with content appropriate to conversation  Cranial nerves: face symmetric, CN II-XII grossly intact.   Sensory: intact to light touch throughout    Motor Strength:Moves all extremities spontaneously with good tone.  Full strength upper and lower extremities. No abnormal movements seen.     Strength  Deltoids Triceps Biceps Wrist Extension Wrist Flexion Hand    Upper: R 5/5 5/5 5/5 5/5 5/5 5/5    L 5/5 5/5 5/5 5/5 5/5 5/5     Iliopsoas Quadriceps Knee  Flexion Tibialis  anterior Gastro- cnemius EHL   Lower: R 5/5 5/5 5/5 5/5 5/5 5/5    L 4-/5 5/5 5/5 4-/5 5/5 5/5   Chronic LLE weakness per patient    Clonus: absent     Significant Labs:  No results for input(s): "GLU", "NA", "K", "CL", "CO2", "BUN", "CREATININE", "CALCIUM", "MG" in the last 48 hours.  No results for input(s): "WBC", "HGB", "HCT", "PLT" in the last 48 hours.  No results for " "input(s): "LABPT", "INR", "APTT" in the last 48 hours.  Microbiology Results (last 7 days)       Procedure Component Value Units Date/Time    Blood Culture #2 **CANNOT BE ORDERED STAT** [[id number]] Collected: 04/26/24 1712    Order Status: Completed Specimen: Blood from Peripheral, Antecubital, Right Updated: 05/01/24 1812     Blood Culture, Routine No growth after 5 days.    Blood Culture #1 **CANNOT BE ORDERED STAT** [[id number]] Collected: 04/26/24 1712    Order Status: Completed Specimen: Blood from Peripheral, Forearm, Left Updated: 05/01/24 1812     Blood Culture, Routine No growth after 5 days.    Clostridium difficile EIA [[id number]] Collected: 04/26/24 2317    Order Status: Completed Specimen: Stool Updated: 04/27/24 1140     C. diff Antigen Negative     C difficile Toxins A+B, EIA Negative     Comment: Testing not recommended for children <24 months old.             All pertinent labs from the last 24 hours have been reviewed.    Significant Diagnostics:  I have reviewed and interpreted all pertinent imaging results/findings within the past 24 hours.  Assessment/Plan:     Lumbar vertebral fracture  Mr. Wagner is a 68M s/p T11 and L3 kyphoplasty with pain management Dr. Aviles on 4/22/2024, now with worsening back pain and diarrhea. Has had multiple ED visits in the past week, CT A/P and MRI show new acute compression fx of L1 with mild retropulsion. In significant pain but remains neuro intact.    MRI L sp 4/27 (partially completed): acute L1 compression fx with mild retropulsion. No severe stenosis. S/p kyphopasty at T11, L3.    - Admitted to medicine for pain control. Recommend multimodal pain control with narcotics, tylenol/NSAIDs, muscle relaxants. Wean IV Dilaudid off.  - Recommend TLSO brace when upright/OOB.   - X-Ray lumbar lateral supine and upright completed, fracture appears stable.   - MRI L sp w/wo to assess for possible pathologic fx. No clear evidence of pathological fx. Redemonstrated " acute-subacute L1 compression fx with 6 mm retropulsion cuasing mild canal stenosis.  - CT chest malignancy workup completed, R upper lobe opacity. Read reports malignancy less likely.  - Will consider L1 kyphoplasty for ongoing pain in the future. Patient not interested at this time.  - Optimization of bone quality per primary team and endocrine. H/o osteopenia on DXA scan from 2023.    Case and plan discussed with Dr. Nichelle Gaitan PAHallieC  Neurosurgery  [facility]in Rock - Neurosurgery ([facility])

## 2024-05-02 NOTE — ASSESSMENT & PLAN NOTE
Hasn't been on lasix   Currently hypovolemic due to diarrhea, s/p 1 L of IVF in the ED  Will encourage PO hydration and utilize IV fluids if needed     Echo    Interpretation Summary    Left Ventricle: The left ventricle is normal in size. Increased wall thickness. There is concentric remodeling. There is normal systolic function with a visually estimated ejection fraction of 55 - 60%. Grade I diastolic dysfunction.    Right Ventricle: Normal right ventricular cavity size. Wall thickness is normal. Right ventricle wall motion  is normal. Systolic function is normal.

## 2024-05-02 NOTE — ASSESSMENT & PLAN NOTE
Hasn't been taking insulin  Will hold off on scheduled insulin  Monitor glucose AC/HS and utilize prn insulin     Patient's FSGs are controlled on current hypoglycemics.   Last A1c reviewed-   Lab Results   Component Value Date    HGBA1C 7.4 (H) 03/01/2024    HGBA1C 7.0 (H) 12/01/2023    HGBA1C 7.1 (H) 09/29/2023     Will hold PO hypoglycemics and will start correctional scale insulin  Most recent fingerstick glucose reviewed-   Recent Labs   Lab 05/01/24  1539 05/01/24  1750 05/01/24  2109 05/02/24  0758   POCTGLUCOSE 276* 207* 203* 171*     currently on   Antihyperglycemics (From admission, onward)      Start     Stop Route Frequency Ordered    04/27/24 0133  insulin aspart U-100 pen 0-5 Units         -- SubQ Before meals & nightly PRN 04/27/24 0133

## 2024-05-02 NOTE — PT/OT/SLP EVAL
Occupational Therapy   Evaluation/tx    Name: Nithin Wagner  MRN: [id number]  Admitting Diagnosis: Intractable pain s/p T11-L2 kyphoplasty  Recent Surgery: Procedure(s) (LRB):  MRI (Magnetic Resonance Imagine) (N/A) 2 Days Post-Op    Recommendations:     Discharge Recommendations: Moderate Intensity Therapy  Discharge Equipment Recommendations:  walker, rolling  Barriers to discharge:  Decreased caregiver support    Assessment:     Nithin Wagner is a 68 y.o. male with a medical diagnosis of Intractable pain s/p s/p T11-L2 kyphoplasty.  He presents with significant pain noted on this date with mobility. Decreased ADL performance noted due to pain and spinal precautions. Pt. Would benefit from continued therapy services to address AE to assist with ADL tasks and safety overall with IADL task performance. Patient would benefit from continued OT services to maximize level of safety and independence with self-care tasks.   . Performance deficits affecting function: weakness, impaired endurance, impaired self care skills, impaired functional mobility, gait instability, pain, other (comment) (spinal precautions).      Rehab Prognosis: Good; patient would benefit from acute skilled OT services to address these deficits and reach maximum level of function.       Plan:     Patient to be seen 3 x/week to address the above listed problems via self-care/home management, therapeutic activities, therapeutic exercises, neuromuscular re-education  Plan of Care Expires:    Plan of Care Reviewed with: patient    Subjective     Chief Complaint: Pain  Patient/Family Comments/goals: to get better and have less pain    Occupational Profile:  Living Environment: Pt. Resides in 2 story house with bed and bath upstairs and 1/2 bath down. Pt. Has 4 steps to enter house and BHR. Pt. Will use 1st floor bedroom initially. Nephew is with pt. About 50% of the time.   Previous level of function: Since fall at beginning of April pt. Has had significant  pain and limitations with ADL tasks. Pt. 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  Patient found left sidelying with  (left sidelying) upon OT entry to room.    General Precautions: Standard, fall  Orthopedic Precautions: spinal precautions  Braces: TLSO  Respiratory Status: Room air    Occupational Performance:    Bed Mobility:    Patient completed Rolling/Turning to Left with  modified independence  Patient completed Supine to Sit with minimum assistance    Functional Mobility/Transfers:  Patient completed Sit <> Stand Transfer with contact guard assistance  with  no assistive device   Patient completed Bed <> Chair Transfer using Step Transfer technique with contact guard assistance with no assistive device  Functional Mobility: not tested    Activities of Daily Living:  Upper Body Dressing: moderate assistance to don TLSO  Lower Body Dressing: total assistance to don socks    Cognitive/Visual Perceptual:  Cognitive/Psychosocial Skills:     -       Oriented to: Person, Place, and Situation   -       Follows Commands/attention:Follows multistep  commands  -       Communication: clear/fluent  -       Memory: No Deficits noted  -       Safety awareness/insight to disability: intact   -       Mood/Affect/Coping skills/emotional control: Appropriate to situation  Visual/Perceptual:      -Intact .    Physical Exam:  Balance: -       sit: good; stand CGA  Skin integrity: some bruising noted BUE  Dominant hand: -       right   Strength:    -        Right Upper Extremity: WFL  -       Left Upper Extremity: WFL  Posture: rounded shoulders forward head    AMPAC 6 Click ADL:  [facility] Total Score: 17    Treatment & Education:  Pt. Educated on role of OT and POC  OT reviewed spinal precautions with pt. And fit of brace  OT introduced concept of using AE to assist with ADL tasks  Pt. Educated on need for staff assist for all mobility    Patient left up in chair with all lines intact, call button in reach, and nurse present    GOALS:   Multidisciplinary Problems       Occupational Therapy Goals          Problem: Occupational Therapy    Goal Priority Disciplines Outcome Interventions   Occupational Therapy Goal     OT, PT/OT Progressing    Description: Goals to be met by: 05-14-24     Patient will increase functional independence with ADLs by performing:    UE Dressing with Set-up Assistance.  LE Dressing with Set-up Assistance with AE  Grooming while standing at sink with Supervision.  Toileting from toilet with Supervision for hygiene and clothing management.   Rolling to Bilateral with [place] using log roll technique  Supine to sit with [place].  Stand pivot transfers with Supervision.  Toilet transfer to toilet with Supervision.  Pt. To be able to recall spinal precautions with 100% accuracy                         History:     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    forehead    Stroke     TIA         Past Surgical History:   Procedure Laterality Date    CATARACT EXTRACTION W/  INTRAOCULAR LENS IMPLANT Right 3/28/2024    Procedure: EXTRACTION, CATARACT, WITH  IOL INSERTION;  Surgeon: Hans Woodward MD;  Location: [facility] OR;  Service: Ophthalmology;  Laterality: Right;    CORONARY ANGIOGRAPHY N/A 3/10/2021    Procedure: ANGIOGRAM, CORONARY ARTERY - right radial;  Surgeon: Shemar Dempsey MD;  Location: [facility] CATH LAB;  Service: Cardiology;  Laterality: N/A;    CORONARY STENT PLACEMENT  03/10/2021    prox-mid RCA Marshal 4.5 x 26 mm, 4.5 x 12 mm    FIXATION KYPHOPLASTY N/A 4/22/2024    Procedure: KYPHOPLASTY;  Surgeon: Stefan Stern MD;  Location: [facility] OR;  Service: Pain Management;  Laterality: N/A;  NO PROPOFOL    INCISION AND DRAINAGE OF SCROTUM N/A 11/15/2022    Procedure: INCISION AND DRAINAGE, SCROTUM;  Surgeon: William Hatch MD;  Location: [facility] OR;  Service: Urology;  Laterality: N/A;    INCISION AND DRAINAGE OF SCROTUM N/A 11/17/2022    Procedure: INCISION AND DRAINAGE, SCROTUM;  Surgeon: William Hatch MD;  Location: [facility] OR;  Service: Urology;  Laterality: N/A;    LUNG LOBECTOMY Right 2008    RUL lobectomy after removal of atrial myxoma    MAGNETIC RESONANCE IMAGING N/A 4/30/2024    Procedure: MRI (Magnetic Resonance Imagine);  Surgeon: Peace Watts;  [facility];  Service: Anesthesiology;  Laterality: N/A;    PLEURA BIOPSY      RESECTION OF ATRIAL MYXOMA  2007       Time Tracking:     OT Date of Treatment: 05/02/24  OT Start Time: 0912  OT Stop Time: 0942  OT Total Time (min): 30 min    Billable Minutes:Evaluation 15  Self Care/Home Management 15    5/2/2024

## 2024-05-02 NOTE — PLAN OF CARE
[facility] ([facility])  Discharge Reassessment    Primary Care Provider: Tushar Drew MD    Expected Discharge Date: 5/3/2024    Reassessment (most recent)       Discharge Reassessment - 05/02/24 1539          Discharge Reassessment    Assessment Type Discharge Planning Reassessment     Did the patient's condition or plan change since previous assessment? Yes     Discharge Plan discussed with: Patient;POA     Name(s) and Number(s) great nephewAnjel     Communicated ALEXANDER with patient/caregiver Yes     Discharge Plan A Skilled Nursing Facility     Discharge Plan B Home Health     DME Needed Upon Discharge  --   TBA    Transition of Care Barriers None     Why the patient remains in the hospital Requires continued medical care                   Pt not ready for discharge due to continued pain, awaiting PT assessment may need  kyphoplasty     Met with patient and nephew Anjel at the bedside  to review discharge recommendation of SNF and is agreeable to plan    Patient/family provided list of facilities in-network with patient's payor plan. Providers that are owned, operated, or affiliated with [facility] are included on the list.     Notified that referral sent to below listed facilities from in-network list based on proximity to home/family support:   [facility]  2.   [facility]   3.   [facility]   4.   [facility]    Patient/family instructed to identify preference.    Preferred Facility: (if more than 1, listed in order of descending preference)  [facility]     If an additional preferred facility not listed above is identified, additional referral to be sent. If above facilities unable to accept, will send additional referrals to in-network providers.    .    Discharge Plan A and Plan B have been determined by review of patient's clinical status, future medical and therapeutic needs, and coverage/benefits for post-acute care in coordination with  multidisciplinary team members.

## 2024-05-02 NOTE — ASSESSMENT & PLAN NOTE
-Due to new L1 fracture   -Hydromorphone 1 mg IV q4h prn for severe pain  -Oxycodone 10 mg q4h prn for moderate pain   -Per spine surgery  MRI L sp 4/27 (partially completed): acute L1 compression fx with mild retropulsion. No severe stenosis. post kyphopasty at T11, L3   -Xrays pending now TSLO brace  -Repeat MRI w/ contrast w/ sedation neg for path fracture;  -chest CT neg for any definitive malignancy ; has lung nodules - pt rec'd outpt f/u.  -will await therapy session w/ PT to see if pt's pain is controlled or needs kyphoplasty.      5/2 awaiting PT session to see if pt can tolerate pain off dilaudid; otherwise  needs kyphoplasty. off  dilaudid and continue oxycodone 5/10mg q 4h PRN.continue tylenol 1000mg q8h, Gabapentin BID and robaxin. needs SNF

## 2024-05-02 NOTE — PLAN OF CARE
[facility]     Department of Hospital Medicine     [address] [place] [address]     [phone] after hours  [phone] fax       NURSING HOME ORDERS    Patient Name: Nithin Wagner  YOB: [birth year]    Admit to Nursing Home:    Skilled Bed                                                  Diagnoses:  Active Hospital Problems    Diagnosis  POA    *Intractable pain [R52]  Yes    Testicular pain, left [N50.812]  Yes    Depression [F32.A]  Yes    Constipation [K59.00]  Yes    Enteritis [K52.9]  Yes    Nephrolithiasis [N20.0]  Yes    Lumbar vertebral fracture [S32.009A]  Yes    Diarrhea of presumed infectious origin [R19.7]  Yes    Hyperlipidemia [E78.5]  Yes    Anxiety [F41.9]  Yes    Rheumatoid arthritis involving multiple sites with positive rheumatoid factor [M05.79]  Yes    Hyponatremia [E87.1]  Yes     Monitor with daily cmp  Replace as needed      Supraventricular tachycardia [I47.10]  Yes    Coronary artery disease [I25.10]  Yes    Tobacco use disorder [F17.200]  Yes    Type 2 diabetes mellitus with hyperglycemia, with long-term current use of insulin [E11.65, Z79.4]  Not Applicable    Chronic diastolic heart failure [I50.32]  Yes     Grade I diastolic dysfunction.   Follow up with cardiology      Essential hypertension [I10]  Yes    Hepatitis B core antibody positive [R76.8]  Yes      Resolved Hospital Problems    Diagnosis Date Resolved POA    Fracture of first thoracic vertebra [S22.019A] 05/02/2024 Yes     Acute compression fracture T1 with mild height loss and 7 mm osseous retropulsion resulting in moderate spinal canal stenosis.          Patient is homebound due to:  Intractable pain    Allergies:  Review of patient's allergies indicates:   Allergen Reactions    Enbrel [etanercept] Shortness Of Breath     CHF    Nsaids (non-steroidal anti-inflammatory drug) Other (See Comments)     hypertention  Other reaction(s): Other (See  Comments)  hypertention  HTN  Patient states he tolerates anti inflammatory eye drops    Statins-hmg-coa reductase inhibitors Other (See Comments)     Heart arrythemias  Other reaction(s): Other (See Comments)  Heart arrythemias  Joint pain and cardiac arrythmias    Pcn [penicillins] Rash       Vitals:      Every shift (Skilled Nursing patients)    Diet: regular diet       Acitivities:    - Up in a chair each morning as tolerated  - Ambulate with assistance to bathroom  - Scheduled walks once each shift (every 8 hours)  - May ambulate independently  - May use walker, cane, or self-propelled wheelchair       - Weight bearing: as tolerated . TLSO brace when upright/OOB.     LABS:  Per facility protocol    Nursing Precautions:     - Aspiration precautions:             - Total assistance with meals            -  Upright 90 degrees befor during and after meals             -  Suction at bedside          - Fall precautions per nursing home protocol   - Decubitus precautions:        -  for positioning   - Pressure reducing foam mattress   - Turn patient every two hours. Use wedge pillows to anchor patient    CONSULTS:     TLSO brace when upright/OOB.      Physical Therapy to evaluate and treat     Occupational Therapy to evaluate and treat      MISCELLANEOUS CARE:      Routine Skin for Bedridden Patients:  Apply moisture barrier cream to all    skin folds and wet areas in perineal area daily and after baths and                           all bowel movements.    DIABETES CARE:      Check blood sugar:      Fingerstick blood sugar AC and HS     Report CBG < 60 or > 400 to physician.                Glucose  Novolog Insulin Subcutaneous        0 - 60   Orange juice or glucose tablet, hold insulin      No insulin   201-250  1 units   251-300  2 units   301-350  3 units   351-400  4 units   >400   5 units then call physician     Medications: Discontinue all previous medication orders, if any. See new list below.      "  Medication List        Start taking these medications      acetaminophen 500 MG tablet  Commonly known as: TYLENOL  Take 2 tablets (1,000 mg total) by mouth every 8 (eight) hours. for 10 days     aspirin 81 MG EC tablet  Commonly known as: ECOTRIN  Take 1 tablet (81 mg total) by mouth once daily.  Replaces: aspirin 81 MG Chew     doxycycline 100 MG tablet  Commonly known as: VIBRA-TABS  Take 1 tablet (100 mg total) by mouth every 12 (twelve) hours. for 10 days     Lactobacillus rhamnosus GG 10 billion cell capsule  Commonly known as: CULTURELLE  Take 1 capsule by mouth once daily.     LIDOcaine 5 %  Commonly known as: LIDODERM  Place 1 patch onto the skin once daily. Remove & Discard patch within 12 hours or as directed by MD     oxyCODONE 10 mg Tab immediate release tablet  Commonly known as: ROXICODONE  Take 1 tablet (10 mg total) by mouth every 4 (four) hours as needed.     polyethylene glycol 17 gram/dose powder  Commonly known as: GLYCOLAX  Use cap to measure 17 grams, mix in liquid and take by mouth once daily.            Change how you take these medications      gabapentin 400 MG capsule  Commonly known as: NEURONTIN  Take 1 capsule (400 mg total) by mouth 3 (three) times daily.  What changed:   medication strength  how much to take  how to take this  when to take this     MOUNJARO 7.5 mg/0.5 mL Pnij  Generic drug: tirzepatide  Inject 7.5 mg into the skin every 7 days.  What changed: additional instructions     tocilizumab 162 mg/0.9 mL Pnij  Inject 162 mg into the skin once a week.  What changed: Another medication with the same name was removed. Continue taking this medication, and follow the directions you see here.            Continue taking these medications      BD ULTRA-FINE RAHUL PEN NEEDLE 32 gauge x 5/32" Ndle  Generic drug: pen needle, diabetic  To use 6 daily     digoxin 125 mcg tablet  Commonly known as: LANOXIN  TAKE 1 TABLET BY MOUTH ONCE DAILY.     ergocalciferol 50,000 unit Cap  Commonly " known as: ERGOCALCIFEROL  Take 1 capsule (50,000 Units total) by mouth every 7 days.     EScitalopram oxalate 20 MG tablet  Commonly known as: LEXAPRO  Take 20 mg by mouth every evening.     folic acid 1 MG tablet  Commonly known as: FOLVITE  Take 1 tablet (1 mg total) by mouth once daily.     ketoconazole 2 % cream  Commonly known as: NIZORAL  Apply to affected area DAILY     lamiVUDine 150 MG Tab  Commonly known as: EPIVIR  TAKE 1 TABLET BY MOUTH EVERY DAY     multivitamin per tablet  Commonly known as: THERAGRAN  Take 1 tablet by mouth once daily.     nitroGLYCERIN 0.4 MG SL tablet  Commonly known as: NITROSTAT  PLACE 1 TABLET UNDER THE TONGUE EVERY 5 MINUTES AS NEEDED FOR CHEST PAIN.     nystatin powder  Commonly known as: MYCOSTATIN  Apply topically 4 (four) times daily. Apply to intertriginous areas as directed up to four times daily to reduce moisture. for 14 days     PROAIR HFA 90 mcg/actuation inhaler  Generic drug: albuterol     walker Misc  1 Units by Misc.(Non-Drug; Combo Route) route daily as needed (Rollator Walker).            Stop taking these medications      aspirin 81 MG Chew  Replaced by: aspirin 81 MG EC tablet     carvediloL 6.25 MG tablet  Commonly known as: COREG     ciprofloxacin HCl 500 MG tablet  Commonly known as: CIPRO     diltiaZEM 120 MG Cp24  Commonly known as: CARDIZEM CD     FIASP PENFILL U-100 INSULIN 100 unit/mL (3 mL) Crtg pen  Generic drug: insulin aspart (niacinamide)     furosemide 80 MG tablet  Commonly known as: LASIX     HYDROmorphone 4 MG tablet  Commonly known as: DILAUDID     metoprolol tartrate 50 MG tablet  Commonly known as: LOPRESSOR     metroNIDAZOLE 500 MG tablet  Commonly known as: FLAGYL     moxifloxacin 0.5 % ophthalmic solution  Commonly known as: VIGAMOX     mupirocin 2 % ointment  Commonly known as: BACTROBAN     olmesartan 40 MG tablet  Commonly known as: BENICAR     oxyCODONE-acetaminophen  mg per tablet  Commonly known as: PERCOCET      oxyCODONE-acetaminophen 5-325 mg per tablet  Commonly known as: PERCOCET     prednisoLONE acetate 1 % Drps  Commonly known as: PRED FORTE     predniSONE 2.5 MG tablet  Commonly known as: DELTASONE     predniSONE 5 MG tablet  Commonly known as: DELTASONE     tiZANidine 4 MG tablet  Commonly known as: ZANAFLEX     TRESIBA FLEXTOUCH U-200 200 unit/mL (3 mL) insulin pen  Generic drug: insulin degludec                 _________________________________  Sung Scherer MD  05/08/2024

## 2024-05-02 NOTE — PROGRESS NOTES
[facility] - Neurosurgery ([facility])  [facility]  Progress Note    Patient Name: Nithin Wagner  MRN: [id number]  Patient Class: IP- Inpatient   Admission Date: 4/26/2024  Length of Stay: 4 days  Attending Physician: Sung Scherer MD  Primary Care Provider: Tushar Drew MD        Subjective:     Principal Problem:Intractable pain        HPI:  Pt is a 68yoM s/p s/p T11 and L3 kyphoplasty on 4/22/2024, now with worsening back pain and diarrhea. Has had multiple ED visits in the past week, CT A/P done yesterday showed Interval compression deformity involving the superior endplate of L1 as well as enteritis, he was discharged home on cipro/flagyl but never started taking them. Was seen by pain management and recommended admission for intractable pain.     in the ED, vital signs are stable, cbc/cmp unremarkable, ESR/CRP down trending.  patient has no red flags for infection/spinal involvement beyond pain and has reassuring neurological examination.  Patient has  required 2 mg of IV hydromorphone.      At bedside, complained of low back pain, denies worsening numbness in his legs, no fecal/urinary incontinence, no fevers, no night sweats. Complained of watery diarrhea for few days, he states that few of his friends have contracted a diarrheal illness as well. Diarrhea is associated with abdominal pain, no nausea/no vomiting. Hasn't been able to eat well and hasn't taking most of his medications in the past few days.     Overview/Hospital Course:  Per NSGY L-spine MRI (partially completed) read showed   acute L1 compression fx with mild retropulsion. No severe stenosis. post kyphopasty at T11, L3. C diff was negative, started on imodium. Pt reports improvement in diarrhea; 1 loose nonbloody bm since 4 am, not as watery anymore. CT chest neg for any definitive malignancy, has nonspecific lung nodules, outpt f/u.     5/2  s/p T11 and L3 kyphoplasty with pain management Dr. Aviles on 4/22/2024, now with worsening  back pain and diarrhea. Has had multiple ED visits in the past week, CT A/P and MRI show new acute compression fx of L1 with mild retropulsion. In significant pain but remains neuro intact. MRI L sp 4/27 (partially completed): acute L1 compression fx with mild retropulsion. No severe stenosis. S/p kyphopasty at T11, L3. CT chest malignancy workup completed, R upper lobe opacity. Read reports malignancy less likely. s/p NSGY Eval - multimodal pain control with narcotics, tylenol/NSAIDs, muscle relaxants. TLSO brace when upright/OOB. No clear evidence of pathological fracture. consider L1 kyphoplasty for ongoing pain in the future. Patient not interested at this time. needs Optimization of bone quality due to  osteopenia on DXA scan from 2023. awaiting PT session to see if pt can tolerate pain off dilaudid; otherwise  needs kyphoplasty. off  dilaudid and continue oxycodone 5/10mg q 4h PRN.continue tylenol 1000mg q8h, Gabapentin BID and robaxin. X-Ray lumbar lateral supine and upright to assess stability of fracture in the brace- Compression fractures of T11, L1 and L3, status post vertebroplasty at T11 and L3 .  needs SNF. received 2 doses of IV dilaudid 0.5mg today         Review of Systems:   Pain scale:   Constitutional:  fever,  chills, headache, vision loss, hearing loss, weight loss, Generalized weakness, falls, loss of smell, loss of taste, poor appetite,  sore throat  Respiratory: cough, shortness of breath.   Cardiovascular: chest pain, dizziness, palpitations, orthopnea, swelling of feet, syncope  Gastrointestinal: nausea, vomiting, abdominal pain, diarrhea, black stool,  blood in stool, change in bowel habits, constipation  Genitourinary: hematuria, dysuria, urgency, frequency  Integument/Breast: rash,  pruritis  Hematologic/Lymphatic: easy bruising, lymphadenopathy  Musculoskeletal: arthralgias , myalgias,  back pain -improved , neck pain, knee pain  Neurological: confusion, seizures, tremors, slurred  "speech  Behavioral/Psych:  depression, anxiety, auditory or visual hallucinations     OBJECTIVE:     Physical Exam:  Body mass index is 28.47 kg/m².    Constitutional: Appears well-developed and well-nourished.   Head: Normocephalic and atraumatic.   Neck: Normal range of motion. Neck supple.   Cardiovascular: Normal heart rate.  Regular heart rhythm.  Pulmonary/Chest: Effort normal.   Abdominal: No distension.  No tenderness  Musculoskeletal: Normal range of motion. + edema. lumbar tendenesss   Neurological: Alert and oriented to person, place, and time. able to move bilateral upper and lower extremities without limitation   Skin: Skin is warm and dry.   Psychiatric: Normal mood and affect. Behavior is normal.                  Vital Signs  Temp: 98.8 °F (37.1 °C) (05/02/24 1214)  Pulse: 98 (05/02/24 1214)  Resp: 16 (05/02/24 1223)  BP: (Abnormal) 142/86 (05/02/24 1214)  SpO2: (Abnormal) 91 % (05/02/24 1214)     24 Hour VS Range    Temp:  [97.6 °F (36.4 °C)-98.8 °F (37.1 °C)]   Pulse:  []   Resp:  [16-20]   BP: (113-157)/(70-86)   SpO2:  [91 %-94 %]     Intake/Output Summary (Last 24 hours) at 5/2/2024 1400  Last data filed at 5/2/2024 1303  Gross per 24 hour   Intake no documentation   Output 1050 ml   Net -1050 ml         I/O This Shift:  I/O this shift:  In: -   Out: 350 [Urine:350]    Wt Readings from Last 3 Encounters:   04/30/24 92.6 kg (204 lb 2.3 oz)   04/26/24 91.3 kg (201 lb 4.5 oz)   04/25/24 99.8 kg (220 lb)       I have personally reviewed the vitals and recorded Intake/Output     Laboratory/Diagnostic Data:    CBC/Anemia Labs: Coags:    Recent Labs   Lab 04/27/24  0558 04/28/24  0720 04/29/24  0242   WBC 9.45 9.07 9.45   HGB 15.2 16.0 15.4   HCT 46.1 48.0 45.4    272 262   * 101* 100*   RDW 12.1 12.1 12.1    No results for input(s): "PT", "INR", "APTT" in the last 168 hours.     Chemistries: ABG:   Recent Labs   Lab 04/26/24  1709 04/27/24  0558 04/28/24  0720 04/29/24  0242   NA " "137 138 136 136   K 3.8 3.8 3.9 4.2    107 106 104   CO2 16* 16* 18* 19*   BUN 9 8 9 7*   CREATININE 0.8 0.8 0.7 0.7   CALCIUM 9.0 8.9 8.6* 8.6*   PROT 6.4 5.8* 5.7* 5.4*   BILITOT 0.4 0.4 0.3 0.4   ALKPHOS 138* 131 123 109   ALT 10 10 8* 8*   AST 13 12 12 11   MG 1.7 1.9 1.6 1.6   PHOS 3.4  --   --   --     No results for input(s): "PH", "PCO2", "PO2", "HCO3", "POCSATURATED", "BE" in the last 168 hours.     POCT Glucose: HbA1c:    Recent Labs   Lab 05/01/24  1205 05/01/24  1539 05/01/24  1750 05/01/24  2109 05/02/24  0758 05/02/24  1217   POCTGLUCOSE 189* 276* 207* 203* 171* 181*    Hemoglobin A1C   Date Value Ref Range Status   03/01/2024 7.4 (H) 4.0 - 5.6 % Final     Comment:     ADA Screening Guidelines:  5.7-6.4%  Consistent with prediabetes  >or=6.5%  Consistent with diabetes    High levels of fetal hemoglobin interfere with the HbA1C  assay. Heterozygous hemoglobin variants (HbS, HgC, etc)do  not significantly interfere with this assay.   However, presence of multiple variants may affect accuracy.     12/01/2023 7.0 (H) 4.0 - 5.6 % Final     Comment:     ADA Screening Guidelines:  5.7-6.4%  Consistent with prediabetes  >or=6.5%  Consistent with diabetes    High levels of fetal hemoglobin interfere with the HbA1C  assay. Heterozygous hemoglobin variants (HbS, HgC, etc)do  not significantly interfere with this assay.   However, presence of multiple variants may affect accuracy.     09/29/2023 7.1 (H) 4.0 - 5.6 % Final     Comment:     ADA Screening Guidelines:  5.7-6.4%  Consistent with prediabetes  >or=6.5%  Consistent with diabetes    High levels of fetal hemoglobin interfere with the HbA1C  assay. Heterozygous hemoglobin variants (HbS, HgC, etc)do  not significantly interfere with this assay.   However, presence of multiple variants may affect accuracy.          Cardiac Enzymes: Ejection Fractions:    No results for input(s): "CPK", "CPKMB", "MB", "TROPONINI" in the last 72 hours. EF   Date Value Ref " "Range Status   10/28/2022 55 % Final   12/22/2021 57 % Final          No results for input(s): "COLORU", "APPEARANCEUA", "PHUR", "SPECGRAV", "PROTEINUA", "GLUCUA", "KETONESU", "BILIRUBINUA", "OCCULTUA", "NITRITE", "UROBILINOGEN", "LEUKOCYTESUR", "RBCUA", "WBCUA", "BACTERIA", "SQUAMEPITHEL", "HYALINECASTS" in the last 48 hours.    Invalid input(s): "WRIGHTSUR"    Procalcitonin (ng/mL)   Date Value   01/06/2023 0.03   11/24/2022 0.19   11/15/2022 48.84 (H)     Lactate (Lactic Acid) (mmol/L)   Date Value   11/15/2022 2.2   11/15/2022 4.4 (HH)   10/26/2022 1.6   10/20/2022 1.9     BNP (pg/mL)   Date Value   04/26/2024 157 (H)   04/03/2024 177 (H)   02/09/2024 176 (H)   12/09/2022 83   11/15/2022 424 (H)     CRP (mg/L)   Date Value   04/26/2024 29.4 (H)   04/24/2024 63.0 (H)   01/06/2023 19.7 (H)   10/21/2022 8.8 (H)   10/21/2022 8.8 (H)   07/21/2022 0.4   04/22/2022 0.2   07/21/2021 0.8   11/24/2020 0.4   09/22/2020 0.6     Sed Rate (mm/Hr)   Date Value   04/26/2024 41 (H)   01/06/2023 78 (H)   10/21/2022 3   10/21/2022 3   07/21/2022 2   04/22/2022 2   07/21/2021 1   11/24/2020 2   09/22/2020 1   08/14/2020 2     D-Dimer (mg/L FEU)   Date Value   10/26/2022 0.40   11/10/2020 0.50 (H)   09/22/2020 0.45     Ferritin (ng/mL)   Date Value   05/05/2020 134   04/07/2020 163     LD (U/L)   Date Value   11/04/2021 260     Troponin I (ng/mL)   Date Value   04/26/2024 <0.006   02/09/2024 0.020   11/16/2022 0.357 (H)   11/15/2022 0.551 (H)   11/15/2022 0.455 (H)   10/26/2022 0.035 (H)   10/20/2022 0.016   10/20/2022 0.013     CPK (U/L)   Date Value   05/30/2022 68   03/10/2021 89   11/10/2020 133   12/22/2009 579 (H)     Results for orders placed or performed in visit on 09/29/23   Vitamin D   Result Value Ref Range    Vit D, 25-Hydroxy 30 30 - 96 ng/mL   Results for orders placed or performed in visit on 10/21/22   Vitamin D   Result Value Ref Range    Vit D, 25-Hydroxy 20 (L) 30 - 96 ng/mL     *Note: Due to a large number of " results and/or encounters for the requested time period, some results have not been displayed. A complete set of results can be found in Results Review.     SARS-CoV2 (COVID-19) Qualitative PCR (no units)   Date Value   01/17/2023 Not Detected   01/13/2023 Not Detected   01/10/2023 Not Detected   01/06/2023 Not Detected   01/03/2023 Not Detected   12/29/2022 Not Detected   12/21/2022 Not Detected   12/12/2022 Not Detected   08/25/2021 Not Detected   03/07/2021 Not Detected     SARS-CoV-2 RNA, Amplification, Qual (no units)   Date Value   01/27/2023 Negative   10/20/2022 Negative     POC Rapid COVID (no units)   Date Value   02/09/2024 Negative   09/21/2023 Negative   10/26/2022 Negative   08/20/2021 Negative   08/18/2021 Negative       Microbiology labs for the last week  Microbiology Results (last 7 days)       Procedure Component Value Units Date/Time    Blood Culture #2 **CANNOT BE ORDERED STAT** [[id number]] Collected: 04/26/24 1712    Order Status: Completed Specimen: Blood from Peripheral, Antecubital, Right Updated: 05/01/24 1812     Blood Culture, Routine No growth after 5 days.    Blood Culture #1 **CANNOT BE ORDERED STAT** [[id number]] Collected: 04/26/24 1712    Order Status: Completed Specimen: Blood from Peripheral, Forearm, Left Updated: 05/01/24 1812     Blood Culture, Routine No growth after 5 days.    Clostridium difficile EIA [[id number]] Collected: 04/26/24 2317    Order Status: Completed Specimen: Stool Updated: 04/27/24 1140     C. diff Antigen Negative     C difficile Toxins A+B, EIA Negative     Comment: Testing not recommended for children <24 months old.               Reviewed and noted in plan where applicable- Please see chart for full lab data.    Lines/Drains:       Peripheral IV - Single Lumen 04/28/24 1026 22 G Posterior;Proximal;Right Forearm (Active)   Site Assessment Clean;Dry;Intact;No redness;No swelling 05/02/24 0400   Extremity Assessment Distal to IV No abnormal  discoloration;No redness;No swelling;No warmth 05/02/24 0400   Line Status Flushed 05/02/24 0400   Dressing Status Clean;Dry;Intact 05/02/24 0400   Dressing Intervention Integrity maintained 05/02/24 0400   Dressing Change Due 05/02/24 05/02/24 0400   Site Change Due 05/02/24 05/02/24 0400   Reason Not Rotated Not due 05/02/24 0400   Number of days: 3       Imaging      Results for orders placed during the hospital encounter of 04/03/24    Echo    Interpretation Summary    Left Ventricle: The left ventricle is normal in size. Increased wall thickness. There is concentric remodeling. There is normal systolic function with a visually estimated ejection fraction of 55 - 60%. Grade I diastolic dysfunction.    Right Ventricle: Normal right ventricular cavity size. Wall thickness is normal. Right ventricle wall motion  is normal. Systolic function is normal.      X-Ray Lumbar Spine AP and Lateral, Upright  Narrative: EXAMINATION:  XR LUMBAR SPINE AP AND LATERAL  UPRIGHT    CLINICAL HISTORY:  Spine fracture, lumbar, pathological;    TECHNIQUE:  AP and lateral views of the lumbar spine.    COMPARISON:  03/01/2024, 04/30/2024    FINDINGS:  Mild compression fractures status post vertebroplasty at L3.  Moderate compression fracture at T11 status post vertebroplasty.    Moderate compression fracture of the superior endplate of L1.  Mild retropulsion is better visualized on prior MRI 04/30/2024.    Multilevel mild disc space narrowing.  Mild osteophytic spurring of the endplates at multiple levels.  Impression: Compression fractures of T11, L1 and L3, status post vertebroplasty at T11 and L3. see prior MRI report also.    Electronically signed by: Tushar May  Date:    05/01/2024  Time:    20:12      Labs, Imaging, EKG and Diagnostic results from 5/2/2024 were reviewed.    Medications:  Medication list was reviewed and changes noted under Assessment/Plan.  No current facility-administered medications on file prior to  "encounter.     Current Outpatient Medications on File Prior to Encounter   Medication Sig Dispense Refill    aspirin 81 MG Chew Take 81 mg by mouth.      BD ULTRA-FINE RAHUL PEN NEEDLE 32 gauge x 5/32" Ndle To use 6 daily 400 each 3    carvediloL (COREG) 6.25 MG tablet Take 1 tablet (6.25 mg total) by mouth 2 (two) times daily with meals. 180 tablet 3    ciprofloxacin HCl (CIPRO) 500 MG tablet Take 1 tablet (500 mg total) by mouth 2 (two) times daily. for 7 days 14 tablet 0    digoxin (LANOXIN) 125 mcg tablet TAKE 1 TABLET BY MOUTH ONCE DAILY. 90 tablet 3    diltiaZEM (CARDIZEM CD) 120 MG Cp24 Take 120 mg by mouth Daily.      ergocalciferol (ERGOCALCIFEROL) 50,000 unit Cap Take 1 capsule (50,000 Units total) by mouth every 7 days. 12 capsule 3    EScitalopram oxalate (LEXAPRO) 20 MG tablet Take 20 mg by mouth every evening.      folic acid (FOLVITE) 1 MG tablet Take 1 tablet (1 mg total) by mouth once daily. 30 tablet 5    furosemide (LASIX) 80 MG tablet Take 1 tablet (80 mg total) by mouth 2 (two) times a day. 180 tablet 3    gabapentin (NEURONTIN) 300 MG capsule 2 (two) times daily.      HYDROmorphone (DILAUDID) 4 MG tablet TAKE 1½ Tablets by mouth EVERY 6 HOURS as needed for SEVERE PAIN 60 tablet 0    HYDROmorphone (DILAUDID) 4 MG tablet TAKE ONE TABLET BY MOUTH EVERY 6 HOURS AS NEEDED FOR SEVERE PAIN 4 tablet 0    HYDROmorphone (DILAUDID) 4 MG tablet TAKE ONE TAB PO Q 6 HOURS PRN SEVERE PAIN 4 tablet 0    insulin aspart, niacinamide, (FIASP PENFILL U-100 INSULIN) 100 unit/mL (3 mL) Crtg pen To use with inPen; 140 max daily dose. 14 pen 11    insulin degludec (TRESIBA FLEXTOUCH U-200) 200 unit/mL (3 mL) insulin pen Inject 60 Units into the skin once daily. Cartridge for novoecho (Patient taking differently: Inject 40 Units into the skin once daily. Cartridge for novoecho) 3 pen 11    ketoconazole (NIZORAL) 2 % cream Apply to affected area DAILY 30 g 3    lamiVUDine (EPIVIR) 150 MG Tab TAKE 1 TABLET BY MOUTH EVERY " DAY 30 tablet 23    metoprolol tartrate (LOPRESSOR) 50 MG tablet Take 50 mg by mouth every 12 (twelve) hours.      metroNIDAZOLE (FLAGYL) 500 MG tablet Take 1 tablet (500 mg total) by mouth every 12 (twelve) hours. for 7 days 14 tablet 0    moxifloxacin (VIGAMOX) 0.5 % ophthalmic solution Place 1 drop into the right eye 3 (three) times daily. 3 mL 3    multivitamin (THERAGRAN) per tablet Take 1 tablet by mouth once daily.      mupirocin (BACTROBAN) 2 % ointment Apply to affected area 3 times daily 22 g 1    nitroGLYCERIN (NITROSTAT) 0.4 MG SL tablet PLACE 1 TABLET UNDER THE TONGUE EVERY 5 MINUTES AS NEEDED FOR CHEST PAIN. 100 tablet 2    nystatin (MYCOSTATIN) powder Apply topically 4 (four) times daily. Apply to intertriginous areas as directed up to four times daily to reduce moisture. for 14 days 30 g 0    olmesartan (BENICAR) 40 MG tablet TAKE 1 TABLET BY MOUTH EVERY DAY AT NIGHT 90 tablet 3    oxyCODONE-acetaminophen (PERCOCET) 5-325 mg per tablet Take 1 tablet by mouth every 6 (six) hours as needed for Pain. 12 tablet 0    prednisoLONE acetate (PRED FORTE) 1 % DrpS Place 1 drop into the right eye 3 (three) times daily. 10 mL 3    predniSONE (DELTASONE) 2.5 MG tablet TAKE 5 TABLETS (12.5 MG TOTAL) BY MOUTH ONCE DAILY. 150 tablet 2    predniSONE (DELTASONE) 5 MG tablet TAKE 2 TABLETS BY MOUTH ONCE DAILY. 60 tablet 3    PROAIR HFA 90 mcg/actuation inhaler       tirzepatide 7.5 mg/0.5 mL PnIj Inject 7.5 mg into the skin every 7 days. (Patient taking differently: Inject 7.5 mg into the skin every 7 days. THURSDAYS, LAST DOSE 4/11/24) 4 Pen 11    tiZANidine (ZANAFLEX) 4 MG tablet TAKE 1 TABLET BY MOUTH EVERY 8 HOURS AS NEEDED FOR PAIN 30 tablet 2    tocilizumab 162 mg/0.9 mL PnIj Inject 162 mg into the skin once a week. 4 mL 0    tocilizumab 162 mg/0.9 mL PnIj Inject 162 mg into the skin once a week. 4 mL 6    walker Misc 1 Units by Misc.(Non-Drug; Combo Route) route daily as needed (Jay Beltre). 1 each 0      Scheduled Medications:  Current Facility-Administered Medications   Medication Dose Route Frequency    acetaminophen  1,000 mg Oral Q8H    digoxin  0.125 mg Oral Daily    enoxparin  40 mg Subcutaneous Daily    EScitalopram oxalate  20 mg Oral QHS    gabapentin  400 mg Oral TID    Lactobacillus rhamnosus GG  1 capsule Oral Daily    methocarbamol (ROBAXIN) IVPB  1,000 mg Intravenous Q8H    polyethylene glycol  17 g Oral BID    tuberculin  5 Units Intradermal Once     PRN:   Current Facility-Administered Medications:     dextrose 10%, 12.5 g, Intravenous, PRN    dextrose 10%, 25 g, Intravenous, PRN    glucagon (human recombinant), 1 mg, Intramuscular, PRN    glucose, 16 g, Oral, PRN    glucose, 24 g, Oral, PRN    HYDROmorphone, 0.5 mg, Intravenous, Q6H PRN    insulin aspart U-100, 0-5 Units, Subcutaneous, QID (AC + HS) PRN    naloxone, 0.02 mg, Intravenous, PRN    ondansetron, 4 mg, Intravenous, Q8H PRN    oxyCODONE, 5 mg, Oral, Q4H PRN    oxyCODONE, 10 mg, Oral, Q4H PRN    sodium chloride 0.9%, 10 mL, Intravenous, Q12H PRN  Infusions:   Current Facility-Administered Medications   Medication Dose Route Frequency Last Rate Last Admin     Estimated Creatinine Clearance: 114.2 mL/min (based on SCr of 0.7 mg/dL).             Assessment/Plan:      * Intractable pain  -Due to new L1 fracture   -Hydromorphone 1 mg IV q4h prn for severe pain  -Oxycodone 10 mg q4h prn for moderate pain   -Per spine surgery  MRI L  4/27 (partially completed): acute L1 compression fx with mild retropulsion. No severe stenosis. post kyphopasty at T11, L3   -Xrays pending now TSLO brace  -Repeat MRI w/ contrast w/ sedation neg for path fracture;  -chest CT neg for any definitive malignancy ; has lung nodules - pt rec'd outpt f/u.  -will await therapy session w/ PT to see if pt's pain is controlled or needs kyphoplasty.      5/2 awaiting PT session to see if pt can tolerate pain off dilaudid; otherwise  needs kyphoplasty. off  dilaudid and  continue oxycodone 5/10mg q 4h PRN.continue tylenol 1000mg q8h, Gabapentin BID and robaxin. needs SNF        Constipation  started on miralax BID      Nephrolithiasis  . Bilateral nonobstructive nephrolithiasis. UA negative       Enteritis  improved as above       Diarrhea of presumed infectious origin  C diff testing negative  Afebrile; no abx needed at this time  Seems resolved now  CT showing   Liquid stool throughout the large bowel and within several small bowel loops, taken together, suggests diarrheal illness possibly in the setting of enteritis. And Left perinephric fat stranding noting somewhat delayed excretion of contrast by the left kidney.     Imodium prn   probiotic  CRP is downtrending      Lumbar vertebral fracture    5/2  s/p T11 and L3 kyphoplasty with pain management Dr. Aviles on 4/22/2024, now with worsening back pain and diarrhea. Has had multiple ED visits in the past week, CT A/P and MRI show new acute compression fx of L1 with mild retropulsion. In significant pain but remains neuro intact. MRI L sp 4/27 (partially completed): acute L1 compression fx with mild retropulsion. No severe stenosis. S/p kyphopasty at T11, L3. CT chest malignancy workup completed, R upper lobe opacity. Read reports malignancy less likely. s/p NSGY Eval - multimodal pain control with narcotics, tylenol/NSAIDs, muscle relaxants. TLSO brace when upright/OOB. No clear evidence of pathological fracture. consider L1 kyphoplasty for ongoing pain in the future. Patient not interested at this time. needs Optimization of bone quality due to  osteopenia on DXA scan from 2023. awaiting PT session to see if pt can tolerate pain off dilaudid; otherwise  needs kyphoplasty. off  dilaudid and continue oxycodone 5/10mg q 4h PRN.continue tylenol 1000mg q8h, Gabapentin BID and robaxin. needs SNF      Rheumatoid arthritis involving multiple sites with positive rheumatoid factor  Hx of RA on tocilizumab, last in march 2024, supposed to  be on lamivudine for hep B ppx    Supraventricular tachycardia  Hx of SVT on digoxin, coreg but hasn't been taking coreg    Continue digoxin for now   Will give IV magnesium 2gm  Optimize potassium > 4    Coronary artery disease  Hx of CAD  Hold ASA for now     Type 2 diabetes mellitus with hyperglycemia, with long-term current use of insulin  Hasn't been taking insulin  Will hold off on scheduled insulin  Monitor glucose AC/HS and utilize prn insulin     Patient's FSGs are controlled on current hypoglycemics.   Last A1c reviewed-   Lab Results   Component Value Date    HGBA1C 7.4 (H) 03/01/2024    HGBA1C 7.0 (H) 12/01/2023    HGBA1C 7.1 (H) 09/29/2023     Will hold PO hypoglycemics and will start correctional scale insulin  Most recent fingerstick glucose reviewed-   Recent Labs   Lab 05/01/24  1539 05/01/24  1750 05/01/24  2109 05/02/24  0758   POCTGLUCOSE 276* 207* 203* 171*     currently on   Antihyperglycemics (From admission, onward)      Start     Stop Route Frequency Ordered    04/27/24 0133  insulin aspart U-100 pen 0-5 Units         -- SubQ Before meals & nightly PRN 04/27/24 0133             Chronic diastolic heart failure  Hasn't been on lasix   Currently hypovolemic due to diarrhea, s/p 1 L of IVF in the ED  Will encourage PO hydration and utilize IV fluids if needed     Echo    Interpretation Summary    Left Ventricle: The left ventricle is normal in size. Increased wall thickness. There is concentric remodeling. There is normal systolic function with a visually estimated ejection fraction of 55 - 60%. Grade I diastolic dysfunction.    Right Ventricle: Normal right ventricular cavity size. Wall thickness is normal. Right ventricle wall motion  is normal. Systolic function is normal.         Hepatitis B core antibody positive  Patient is supposed to be on ppx with lamivudine but hasn't been taking it for few weeks  Will hold off resuming it for now, needs to be resumed at discharge         VTE Risk  Mitigation (From admission, onward)           Ordered     enoxaparin injection 40 mg  Daily         04/27/24 0133     IP VTE HIGH RISK PATIENT  Once         04/27/24 0133     Place sequential compression device  Until discontinued         04/27/24 0133                    Discharge Planning   ALEXANDER: 5/3/2024     Code Status: Full Code   Is the patient medically ready for discharge?: (No Documentation)    Reason for patient still in hospital (select all that apply): Treatment  Discharge Plan A: Skilled Nursing Facility                  Sung Scherer MD  Department of Hospital Medicine   [facility] [facility] - Neurosurgery ([facility])

## 2024-05-02 NOTE — ASSESSMENT & PLAN NOTE
5/2  s/p T11 and L3 kyphoplasty with pain management Dr. Aviles on 4/22/2024, now with worsening back pain and diarrhea. Has had multiple ED visits in the past week, CT A/P and MRI show new acute compression fx of L1 with mild retropulsion. In significant pain but remains neuro intact. MRI L sp 4/27 (partially completed): acute L1 compression fx with mild retropulsion. No severe stenosis. S/p kyphopasty at T11, L3. CT chest malignancy workup completed, R upper lobe opacity. Read reports malignancy less likely. s/p NSGY Eval - multimodal pain control with narcotics, tylenol/NSAIDs, muscle relaxants. TLSO brace when upright/OOB. No clear evidence of pathological fracture. consider L1 kyphoplasty for ongoing pain in the future. Patient not interested at this time. needs Optimization of bone quality due to  osteopenia on DXA scan from 2023. awaiting PT session to see if pt can tolerate pain off dilaudid; otherwise  needs kyphoplasty. off  dilaudid and continue oxycodone 5/10mg q 4h PRN.continue tylenol 1000mg q8h, Gabapentin BID and robaxin. needs SNF

## 2024-05-02 NOTE — SUBJECTIVE & OBJECTIVE
Interval History: 5/2: NAEO. MRI completed, L1 compression fx with 6mm of retropulsion, with mild canal stenosis. Fracture is stable on upright X-ray. Patient declining kyphoplasty at this time. Will plan to follow up outpatient.     Medications:  Continuous Infusions:  Current Facility-Administered Medications   Medication Dose Route Frequency Last Rate Last Admin     Scheduled Meds:  Current Facility-Administered Medications   Medication Dose Route Frequency    acetaminophen  1,000 mg Oral Q8H    digoxin  0.125 mg Oral Daily    enoxparin  40 mg Subcutaneous Daily    EScitalopram oxalate  20 mg Oral QHS    gabapentin  400 mg Oral TID    Lactobacillus rhamnosus GG  1 capsule Oral Daily    LIDOcaine  1 patch Transdermal Q24H    methocarbamol (ROBAXIN) IVPB  1,000 mg Intravenous Q8H    polyethylene glycol  17 g Oral BID     PRN Meds:  Current Facility-Administered Medications:     dextrose 10%, 12.5 g, Intravenous, PRN    dextrose 10%, 25 g, Intravenous, PRN    glucagon (human recombinant), 1 mg, Intramuscular, PRN    glucose, 16 g, Oral, PRN    glucose, 24 g, Oral, PRN    HYDROmorphone, 0.5 mg, Intravenous, Q6H PRN    insulin aspart U-100, 0-5 Units, Subcutaneous, QID (AC + HS) PRN    naloxone, 0.02 mg, Intravenous, PRN    ondansetron, 4 mg, Intravenous, Q8H PRN    oxyCODONE, 5 mg, Oral, Q4H PRN    oxyCODONE, 10 mg, Oral, Q4H PRN    sodium chloride 0.9%, 10 mL, Intravenous, Q12H PRN     Objective:     Weight: 92.6 kg (204 lb 2.3 oz)  Body mass index is 28.47 kg/m².  Vital Signs (Most Recent):  Temp: 98.4 °F (36.9 °C) (05/02/24 1536)  Pulse: 94 (05/02/24 1536)  Resp: 18 (05/02/24 1638)  BP: (!) 141/84 (05/02/24 1536)  SpO2: (!) 93 % (05/02/24 1536) Vital Signs (24h Range):  Temp:  [97.6 °F (36.4 °C)-98.8 °F (37.1 °C)] 98.4 °F (36.9 °C)  Pulse:  [] 94  Resp:  [16-24] 18  SpO2:  [91 %-93 %] 93 %  BP: (113-157)/(70-86) 141/84     Date 05/02/24 0700 - 05/03/24 0659   Shift 2073-4527 4670-6378 8930-5656 24 Hour Total  "  INTAKE   Shift Total(mL/kg)       OUTPUT   Urine(mL/kg/hr) 350(0.5) 450  800   Shift Total(mL/kg) 350(3.8) 450(4.9)  800(8.6)   Weight (kg) 92.6 92.6 92.6 92.6      Physical Exam  General: well developed, well nourished, no distress.   Head: normocephalic, atraumatic  Mental Status: Awake, Alert, Oriented  Speech: Clear with content appropriate to conversation  Cranial nerves: face symmetric, CN II-XII grossly intact.   Sensory: intact to light touch throughout    Motor Strength:Moves all extremities spontaneously with good tone.  Full strength upper and lower extremities. No abnormal movements seen.     Strength  Deltoids Triceps Biceps Wrist Extension Wrist Flexion Hand    Upper: R 5/5 5/5 5/5 5/5 5/5 5/5    L 5/5 5/5 5/5 5/5 5/5 5/5     Iliopsoas Quadriceps Knee  Flexion Tibialis  anterior Gastro- cnemius EHL   Lower: R 4-/5 5/5 5/5 4-/5 5/5 5/5    L 5/5 5/5 5/5 5/5 5/5 5/5   Chronic RLE weakness per patient    Clonus: absent     Significant Labs:  No results for input(s): "GLU", "NA", "K", "CL", "CO2", "BUN", "CREATININE", "CALCIUM", "MG" in the last 48 hours.  No results for input(s): "WBC", "HGB", "HCT", "PLT" in the last 48 hours.  No results for input(s): "LABPT", "INR", "APTT" in the last 48 hours.  Microbiology Results (last 7 days)       Procedure Component Value Units Date/Time    Blood Culture #2 **CANNOT BE ORDERED STAT** [[id number]] Collected: 04/26/24 1712    Order Status: Completed Specimen: Blood from Peripheral, Antecubital, Right Updated: 05/01/24 1812     Blood Culture, Routine No growth after 5 days.    Blood Culture #1 **CANNOT BE ORDERED STAT** [[id number]] Collected: 04/26/24 1712    Order Status: Completed Specimen: Blood from Peripheral, Forearm, Left Updated: 05/01/24 1812     Blood Culture, Routine No growth after 5 days.    Clostridium difficile EIA [[id number]] Collected: 04/26/24 2317    Order Status: Completed Specimen: Stool Updated: 04/27/24 1140     C. diff Antigen Negative "     C difficile Toxins A+B, EIA Negative     Comment: Testing not recommended for children <24 months old.             All pertinent labs from the last 24 hours have been reviewed.    Significant Diagnostics:  I have reviewed and interpreted all pertinent imaging results/findings within the past 24 hours.

## 2024-05-02 NOTE — PT/OT/SLP PROGRESS
"Physical Therapy Treatment    Patient Name:  Nithin Wagner   MRN:  [id number]    Recommendations:     Discharge Recommendations: Moderate Intensity Therapy  Discharge Equipment Recommendations: walker, rolling  Barriers to discharge: Inaccessible home and shower on 2nd floor    Assessment:     Nithin Wagner is a 68 y.o. male admitted with a medical diagnosis of Intractable pain.  He presents with the following impairments/functional limitations: weakness, impaired endurance, impaired self care skills, impaired functional mobility, gait instability, pain.    Pt agreeable to BLE therex in supine at this time and declining to mobilize 2/2 c/o 8/10 pain. Time spent educating  pt on the importance of OOB activities and the benefits of participating in physical therapy. Pt will continue to benefit from skilled therapy services.    Rehab Prognosis: Good; patient would benefit from acute skilled PT services to address these deficits and reach maximum level of function.    Recent Surgery: Procedure(s) (LRB):  MRI (Magnetic Resonance Imagine) (N/A) 2 Days Post-Op    Plan:     During this hospitalization, patient to be seen 3 x/week to address the identified rehab impairments via gait training, therapeutic activities, therapeutic exercises, neuromuscular re-education, wheelchair management/training and progress toward the following goals:    Plan of Care Expires:  06/01/24    Subjective     Chief Complaint: 8/10 back pain  Patient/Family Comments/goals: "Can I do some exercises in bed?"  Pain/Comfort:  Pain Rating 1: 8/10  Location - Side 1: Bilateral  Location - Orientation 1: medial  Location 1: back  Pain Addressed 1: Distraction  Pain Rating Post-Intervention 1: 8/10      Objective:     Communicated with RN (Laura) prior to session.  Patient found HOB elevated with peripheral IV (son present) upon PTA entry to room.     General Precautions: Standard, fall  Orthopedic Precautions: spinal precautions  Braces: TLSO  Respiratory " Status: Room air     Functional Mobility:  Bed Mobility:     Rolling Left:  SBA utilizing log roll technique   Scooting:to HOB  total assistance x2 persons via simone pads  Pt declines further mobility at this time 2/2 pain.      AM-PAC 6 CLICK MOBILITY  Turning over in bed (including adjusting bedclothes, sheets and blankets)?: 4  Sitting down on and standing up from a chair with arms (e.g., wheelchair, bedside commode, etc.): 4  Moving from lying on back to sitting on the side of the bed?: 4  Moving to and from a bed to a chair (including a wheelchair)?: 4  Need to walk in hospital room?: 3  Climbing 3-5 steps with a railing?: 3  Basic Mobility Total Score: 22       Treatment & Education:  Patient provided with daily orientation and goals of this PT session.     Pt educated on post acute settings, the differences between inpatient rehab and skilled nursing facilities. Pt also educated on the difference between PT and OT in the acute care setting. All questions answered within scope of PTA practice.     Pt educated on spinal precautions and verbalizes understanding.     Pt performs the following BLE therex in supine:  X20 AP  X10 Heel slides  Pt declines further therex.     Pt educated to call for assistance and to transfer with hospital staff only.  Also, pt was educated on the effects of prolonged immobility and the importance of performing OOB activity and exercises to promote healing and reduce recovery time.    Patient left HOB elevated with all lines intact, call button in reach, RN notified, and Son present.    GOALS:   Multidisciplinary Problems       Physical Therapy Goals          Problem: Physical Therapy    Goal Priority Disciplines Outcome Goal Variances Interventions   Physical Therapy Goal     PT, PT/OT Progressing     Description: Goals to be met by: 24     Patient will increase functional independence with mobility by performin. Sit to stand transfer with [place]  2. Bed to chair  transfer with [place] using Rolling Walker  3. Gait  x 150 feet with Supervision using Rolling Walker.   4. Ascend/descend 12 stair with bilateral Handrails Supervision using Single-point Cane .   5. Stand for 10 minutes with Supervision using Rolling Walker  6. Increased functional strength to WNL for ambulation and stairs to improve ambulation quality and decrease LLE buckling.                         Time Tracking:     PT Received On: 05/02/24  PT Start Time: 1424     PT Stop Time: 1440  PT Total Time (min): 16 min     Billable Minutes: Therapeutic Activity 16    Treatment Type: Treatment  PT/PTA: PTA     Number of PTA visits since last PT visit: 1 05/02/2024

## 2024-05-02 NOTE — ASSESSMENT & PLAN NOTE
Mr. Wagner is a 68M s/p T11 and L3 kyphoplasty with pain management Dr. Aviles on 4/22/2024, now with worsening back pain and diarrhea. Has had multiple ED visits in the past week, CT A/P and MRI show new acute compression fx of L1 with mild retropulsion. In significant pain but remains neuro intact.    MRI L sp 4/27 (partially completed): acute L1 compression fx with mild retropulsion. No severe stenosis. S/p kyphopasty at T11, L3.    - Admitted to medicine for pain control. Recommend multimodal pain control with narcotics, tylenol/NSAIDs, muscle relaxants. Wean IV Dilaudid off.  - Recommend TLSO brace when upright/OOB.   - X-Ray lumbar lateral supine and upright completed, fracture appears stable.   - MRI L sp w/wo to assess for possible pathologic fx. No clear evidence of pathological fx. Redemonstrated acute-subacute L1 compression fx with 6 mm retropulsion cuasing mild canal stenosis.  - CT chest malignancy workup completed, R upper lobe opacity. Read reports malignancy less likely.  - Will consider L1 kyphoplasty for ongoing pain in the future. Patient not interested at this time.  - Optimization of bone quality per primary team and endocrine. H/o osteopenia on DXA scan from 2023.    Case and plan discussed with Dr. Steward

## 2024-05-02 NOTE — PLAN OF CARE
LOCET was completed and PASRR was uploaded to [facility].        Iain Solitario [facility]  Case Management  [phone]

## 2024-05-03 ENCOUNTER — TELEPHONE (OUTPATIENT)
Dept: NEUROSURGERY | Facility: CLINIC | Age: 69
End: 2024-05-03
Payer: MEDICARE

## 2024-05-03 PROBLEM — F32.A DEPRESSION: Status: ACTIVE | Noted: 2024-05-03

## 2024-05-03 LAB
ALBUMIN SERPL BCP-MCNC: 2 G/DL (ref 3.5–5.2)
ALP SERPL-CCNC: 108 U/L (ref 55–135)
ALT SERPL W/O P-5'-P-CCNC: 10 U/L (ref 10–44)
ANION GAP SERPL CALC-SCNC: 10 MMOL/L (ref 8–16)
AST SERPL-CCNC: 18 U/L (ref 10–40)
BASOPHILS # BLD AUTO: 0.12 K/UL (ref 0–0.2)
BASOPHILS NFR BLD: 1.8 % (ref 0–1.9)
BILIRUB SERPL-MCNC: 0.3 MG/DL (ref 0.1–1)
BUN SERPL-MCNC: 7 MG/DL (ref 8–23)
CALCIUM SERPL-MCNC: 8.2 MG/DL (ref 8.7–10.5)
CHLORIDE SERPL-SCNC: 102 MMOL/L (ref 95–110)
CO2 SERPL-SCNC: 23 MMOL/L (ref 23–29)
CREAT SERPL-MCNC: 0.6 MG/DL (ref 0.5–1.4)
DIFFERENTIAL METHOD BLD: ABNORMAL
EOSINOPHIL # BLD AUTO: 0.3 K/UL (ref 0–0.5)
EOSINOPHIL NFR BLD: 4 % (ref 0–8)
ERYTHROCYTE [DISTWIDTH] IN BLOOD BY AUTOMATED COUNT: 12.2 % (ref 11.5–14.5)
EST. GFR  (NO RACE VARIABLE): >60 ML/MIN/1.73 M^2
FOLATE SERPL-MCNC: 12.4 NG/ML (ref 4–24)
GLUCOSE SERPL-MCNC: 146 MG/DL (ref 70–110)
HCT VFR BLD AUTO: 46.9 % (ref 40–54)
HGB BLD-MCNC: 15 G/DL (ref 14–18)
IMM GRANULOCYTES # BLD AUTO: 0.07 K/UL (ref 0–0.04)
IMM GRANULOCYTES NFR BLD AUTO: 1 % (ref 0–0.5)
LYMPHOCYTES # BLD AUTO: 1.5 K/UL (ref 1–4.8)
LYMPHOCYTES NFR BLD: 21.6 % (ref 18–48)
MAGNESIUM SERPL-MCNC: 1.7 MG/DL (ref 1.6–2.6)
MCH RBC QN AUTO: 33.9 PG (ref 27–31)
MCHC RBC AUTO-ENTMCNC: 32 G/DL (ref 32–36)
MCV RBC AUTO: 106 FL (ref 82–98)
MONOCYTES # BLD AUTO: 0.8 K/UL (ref 0.3–1)
MONOCYTES NFR BLD: 12.4 % (ref 4–15)
NEUTROPHILS # BLD AUTO: 4 K/UL (ref 1.8–7.7)
NEUTROPHILS NFR BLD: 59.2 % (ref 38–73)
NRBC BLD-RTO: 0 /100 WBC
PLATELET # BLD AUTO: 242 K/UL (ref 150–450)
PMV BLD AUTO: 11.7 FL (ref 9.2–12.9)
POCT GLUCOSE: 153 MG/DL (ref 70–110)
POCT GLUCOSE: 158 MG/DL (ref 70–110)
POCT GLUCOSE: 193 MG/DL (ref 70–110)
POTASSIUM SERPL-SCNC: 4.2 MMOL/L (ref 3.5–5.1)
PROT SERPL-MCNC: 5.4 G/DL (ref 6–8.4)
RBC # BLD AUTO: 4.43 M/UL (ref 4.6–6.2)
SODIUM SERPL-SCNC: 135 MMOL/L (ref 136–145)
VIT B12 SERPL-MCNC: 1517 PG/ML (ref 210–950)
WBC # BLD AUTO: 6.71 K/UL (ref 3.9–12.7)

## 2024-05-03 PROCEDURE — 25000003 PHARM REV CODE 250: Performed by: INTERNAL MEDICINE

## 2024-05-03 PROCEDURE — 63600175 PHARM REV CODE 636 W HCPCS

## 2024-05-03 PROCEDURE — 36415 COLL VENOUS BLD VENIPUNCTURE: CPT | Performed by: HOSPITALIST

## 2024-05-03 PROCEDURE — 82607 VITAMIN B-12: CPT | Performed by: HOSPITALIST

## 2024-05-03 PROCEDURE — 25000003 PHARM REV CODE 250: Performed by: HOSPITALIST

## 2024-05-03 PROCEDURE — 97535 SELF CARE MNGMENT TRAINING: CPT | Mod: CO

## 2024-05-03 PROCEDURE — 83735 ASSAY OF MAGNESIUM: CPT | Performed by: HOSPITALIST

## 2024-05-03 PROCEDURE — 63600175 PHARM REV CODE 636 W HCPCS: Performed by: HOSPITALIST

## 2024-05-03 PROCEDURE — 80053 COMPREHEN METABOLIC PANEL: CPT | Performed by: HOSPITALIST

## 2024-05-03 PROCEDURE — 11000001 HC ACUTE MED/SURG PRIVATE ROOM

## 2024-05-03 PROCEDURE — 82746 ASSAY OF FOLIC ACID SERUM: CPT | Performed by: HOSPITALIST

## 2024-05-03 PROCEDURE — 97530 THERAPEUTIC ACTIVITIES: CPT | Mod: CO

## 2024-05-03 PROCEDURE — 85025 COMPLETE CBC W/AUTO DIFF WBC: CPT | Performed by: HOSPITALIST

## 2024-05-03 PROCEDURE — 25000003 PHARM REV CODE 250

## 2024-05-03 PROCEDURE — 63600175 PHARM REV CODE 636 W HCPCS: Performed by: INTERNAL MEDICINE

## 2024-05-03 RX ADMIN — GABAPENTIN 400 MG: 400 CAPSULE ORAL at 02:05

## 2024-05-03 RX ADMIN — METHOCARBAMOL 1000 MG: 100 INJECTION, SOLUTION INTRAMUSCULAR; INTRAVENOUS at 05:05

## 2024-05-03 RX ADMIN — HYDROMORPHONE HYDROCHLORIDE 0.5 MG: 1 INJECTION, SOLUTION INTRAMUSCULAR; INTRAVENOUS; SUBCUTANEOUS at 12:05

## 2024-05-03 RX ADMIN — ACETAMINOPHEN 1000 MG: 500 TABLET ORAL at 09:05

## 2024-05-03 RX ADMIN — OXYCODONE HYDROCHLORIDE 10 MG: 10 TABLET ORAL at 09:05

## 2024-05-03 RX ADMIN — METHOCARBAMOL 1000 MG: 100 INJECTION, SOLUTION INTRAMUSCULAR; INTRAVENOUS at 02:05

## 2024-05-03 RX ADMIN — OXYCODONE HYDROCHLORIDE 10 MG: 10 TABLET ORAL at 12:05

## 2024-05-03 RX ADMIN — GABAPENTIN 400 MG: 400 CAPSULE ORAL at 09:05

## 2024-05-03 RX ADMIN — OXYCODONE HYDROCHLORIDE 10 MG: 10 TABLET ORAL at 04:05

## 2024-05-03 RX ADMIN — ACETAMINOPHEN 1000 MG: 500 TABLET ORAL at 06:05

## 2024-05-03 RX ADMIN — POLYETHYLENE GLYCOL 3350 17 G: 17 POWDER, FOR SOLUTION ORAL at 09:05

## 2024-05-03 RX ADMIN — OXYCODONE HYDROCHLORIDE 10 MG: 10 TABLET ORAL at 02:05

## 2024-05-03 RX ADMIN — METHOCARBAMOL 1000 MG: 100 INJECTION, SOLUTION INTRAMUSCULAR; INTRAVENOUS at 10:05

## 2024-05-03 RX ADMIN — OXYCODONE HYDROCHLORIDE 10 MG: 10 TABLET ORAL at 06:05

## 2024-05-03 RX ADMIN — ESCITALOPRAM OXALATE 20 MG: 20 TABLET, FILM COATED ORAL at 09:05

## 2024-05-03 RX ADMIN — ENOXAPARIN SODIUM 40 MG: 40 INJECTION SUBCUTANEOUS at 05:05

## 2024-05-03 RX ADMIN — ACETAMINOPHEN 1000 MG: 500 TABLET ORAL at 01:05

## 2024-05-03 RX ADMIN — Medication 1 CAPSULE: at 09:05

## 2024-05-03 RX ADMIN — DIGOXIN 0.12 MG: 125 TABLET ORAL at 09:05

## 2024-05-03 NOTE — PLAN OF CARE
Problem: Adult Inpatient Plan of Care  Goal: Patient-Specific Goal (Individualized)  Description: Pt will maintain sbp below 180  Outcome: Progressing  Flowsheets (Taken 5/3/2024 0334)  Individualized Care Needs: warm blankets  Anxieties, Fears or Concerns: pain management  Goal: Optimal Comfort and Wellbeing  Outcome: Progressing  Intervention: Monitor Pain and Promote Comfort  Flowsheets (Taken 5/3/2024 0334)  Pain Management Interventions:   care clustered   warm blanket provided  Intervention: Provide Person-Centered Care  Flowsheets (Taken 5/3/2024 0334)  Trust Relationship/Rapport:   care explained   choices provided     Hydromorphone given x 1 and oxycodone given x 2 for pain. VSS. Bed in lowest position, side rails x 3, and call light in use.

## 2024-05-03 NOTE — PLAN OF CARE
Problem: Adult Inpatient Plan of Care  Goal: Plan of Care Review  5/3/2024 1716 by Tiffany Zamora LPN  Outcome: Progressing  5/3/2024 1716 by Tiffany Zamora LPN  Outcome: Not Progressing  Goal: Patient-Specific Goal (Individualized)  Description: Pt will maintain sbp below 180  5/3/2024 1716 by Tiffany Zamora LPN  Outcome: Progressing  5/3/2024 1716 by Tiffany Zamora LPN  Outcome: Not Progressing  Goal: Absence of Hospital-Acquired Illness or Injury  5/3/2024 1716 by Tiffany Zamora LPN  Outcome: Progressing  5/3/2024 1716 by Tiffany Zamora LPN  Outcome: Not Progressing  Goal: Optimal Comfort and Wellbeing  5/3/2024 1716 by Tiffany Zamora LPN  Outcome: Progressing  5/3/2024 1716 by Tiffany Zamora LPN  Outcome: Not Progressing  Goal: Readiness for Transition of Care  5/3/2024 1716 by Tiffany Zamora LPN  Outcome: Progressing  5/3/2024 1716 by Tiffany Zamora LPN  Outcome: Not Progressing     Problem: Diabetes Comorbidity  Goal: Blood Glucose Level Within Targeted Range  5/3/2024 1716 by Tiffany Zamora LPN  Outcome: Progressing  5/3/2024 1716 by Tiffany Zamora LPN  Outcome: Not Progressing     Problem: Infection  Goal: Absence of Infection Signs and Symptoms  5/3/2024 1716 by Tiffany Zamora LPN  Outcome: Progressing  5/3/2024 1716 by Tiffany Zamora LPN  Outcome: Not Progressing     Problem: Wound  Goal: Optimal Coping  5/3/2024 1716 by Tiffany Zamora LPN  Outcome: Progressing  5/3/2024 1716 by Tiffany Zamora LPN  Outcome: Not Progressing  Goal: Optimal Functional Ability  5/3/2024 1716 by Tiffany Zamora LPN  Outcome: Progressing  5/3/2024 1716 by Tiffany Zamora LPN  Outcome: Not Progressing  Goal: Absence of Infection Signs and Symptoms  5/3/2024 1716 by Tiffany Zamora LPN  Outcome: Progressing  5/3/2024 1716 by Tiffany Zamora LPN  Outcome: Not Progressing  Goal: Improved Oral Intake  5/3/2024 1716 by Tiffany Zamora LPN  Outcome: Progressing  5/3/2024 4630  by Tiffany Zamora LPN  Outcome: Not Progressing  Goal: Optimal Pain Control and Function  5/3/2024 1716 by Tiffany Zamora LPN  Outcome: Progressing  5/3/2024 1716 by Tiffany Zamora LPN  Outcome: Not Progressing  Goal: Skin Health and Integrity  5/3/2024 1716 by Tiffany Zamora LPN  Outcome: Progressing  5/3/2024 1716 by Tiffany Zamora LPN  Outcome: Not Progressing  Goal: Optimal Wound Healing  5/3/2024 1716 by Tiffany Zamora LPN  Outcome: Progressing  5/3/2024 1716 by Tiffany Zamora LPN  Outcome: Not Progressing     Problem: Fall Injury Risk  Goal: Absence of Fall and Fall-Related Injury  5/3/2024 1716 by Tiffany Zamora LPN  Outcome: Progressing  5/3/2024 1716 by Tiffany Zamora LPN  Outcome: Not Progressing

## 2024-05-03 NOTE — ASSESSMENT & PLAN NOTE
Hasn't been taking insulin  Will hold off on scheduled insulin  Monitor glucose AC/HS and utilize prn insulin     Patient's FSGs are controlled on current hypoglycemics.   Last A1c reviewed-   Lab Results   Component Value Date    HGBA1C 7.4 (H) 03/01/2024    HGBA1C 7.0 (H) 12/01/2023    HGBA1C 7.1 (H) 09/29/2023     Will hold PO hypoglycemics and will start correctional scale insulin  Most recent fingerstick glucose reviewed-   Recent Labs   Lab 05/05/24  0845 05/05/24  1520 05/05/24  2102 05/06/24  0738   POCTGLUCOSE 176* 185* 162* 170*     currently on   Antihyperglycemics (From admission, onward)      Start     Stop Route Frequency Ordered    04/27/24 0133  insulin aspart U-100 pen 0-5 Units         -- SubQ Before meals & nightly PRN 04/27/24 0133

## 2024-05-03 NOTE — PT/OT/SLP PROGRESS
Occupational Therapy   Treatment    Name: Nithin Wagner  MRN: [id number]  Admitting Diagnosis:  Intractable pain  3 Days Post-Op  Procedure(s):  MRI (Magnetic Resonance Imagine)   Recommendations:     Discharge Recommendations: Moderate Intensity Therapy  Discharge Equipment Recommendations:  walker, rolling  Barriers to discharge:  Decreased caregiver support    Assessment:     Nithin Wagner is a 68 y.o. male with a medical diagnosis of Intractable pain.  He presents with the following performance deficits affecting function are weakness, impaired endurance, impaired self care skills, impaired functional mobility, gait instability, impaired balance, pain, decreased safety awareness, decreased lower extremity function, decreased ROM, orthopedic precautions. Patient limited by c/o significant pain requiring x2 therapy sessions. Patient limited by c/o nausea and dizziness during functional mobility, but made good progress with ADLs and activity tolerance overall. Patient continues to demonstrate the need for moderate intensity therapy on a daily basis post acute exhibited by decreased independence with self-care and functional mobility    Rehab Prognosis:  Good; patient would benefit from acute skilled OT services to address these deficits and reach maximum level of function.       Plan:     Patient to be seen 3 x/week to address the above listed problems via self-care/home management, therapeutic activities, therapeutic exercises, neuromuscular re-education  Plan of Care Expires:    Plan of Care Reviewed with: patient    Subjective     Chief Complaint: c/o pain  Patient/Family Comments/goals: return to PLOF  Pain/Comfort:  Pain Rating 1: 8/10  Location - Orientation 1: generalized  Location 1: back  Pain Addressed 1: Nurse notified, Pre-medicate for activity  Pain Rating Post-Intervention 1: 8/10    Objective:     Communicated with: nurse shaq and OTR prior to session.  Patient found supine with  (no active lines)  upon OT entry to room.  A client care conference was completed by the OTR and the CAMPBELL prior to treatment by the CAMPBELL to discuss the patient's POC and current status.    General Precautions: Standard, fall    Orthopedic Precautions:spinal precautions  Braces: TLSO  Respiratory Status: Room air     Occupational Performance:     Bed Mobility:    Patient completed Rolling/Turning to Left with  modified independence and with side rail  Patient completed Supine to Sit with supervision, with side rail, and via log roll technique      Functional Mobility/Transfers:  Patient completed Sit <> Stand Transfer with stand by assistance  with  rolling walker   Patient completed Bed > Chair Transfer using Step Transfer technique with contact guard assistance with rolling walker  Functional Mobility: within room and hallway 24ft using RW with CGA, +chair follow; deferred further mobility 2/2 c/o nausea and dizziness, nurse notified    Activities of Daily Living:  Upper Body Dressing: minimum assistance doff and don clean t-shirt and TLSO seated EOB  Lower Body Dressing: contact guard assistance don underwear and shorts seated EOB using supported fig 4 technique, CGA for pulling up to waist in standing (patient is Total(A) of donning B socks but reports not wearing socks at baseline)  Toileting: stand by assistance perirectal hygiene in side lying (CAMPBELL entry with patient post BM)       [facility] 6 Click ADL: 18    Treatment & Education:  Patient edu on goals and current progress. Patient re-edu on spinal precautions as he recalled 2 of 3 on 1st attempt. Patient edu on TLSO donning technique. Patient encouraged to get OOB and ambulate to bathroom with nursing (A) of 1 person using RW to promote progressive mobility. Addressed all patient questions/concerns within CAMPBELL scope of practice.     Patient left up in chair with all lines intact, call button in reach, chair alarm on, and nurse notified    GOALS:   Multidisciplinary Problems        Occupational Therapy Goals          Problem: Occupational Therapy    Goal Priority Disciplines Outcome Interventions   Occupational Therapy Goal     OT, PT/OT Progressing    Description: Goals to be met by: 05-14-24     Patient will increase functional independence with ADLs by performing:    UE Dressing with Set-up Assistance.  LE Dressing with Set-up Assistance with AE  Grooming while standing at sink with Supervision.  Toileting from toilet with Supervision for hygiene and clothing management.   Rolling to Bilateral with [place] using log roll technique  Supine to sit with [place].  Stand pivot transfers with Supervision.  Toilet transfer to toilet with Supervision.  Pt. To be able to recall spinal precautions with 100% accuracy                         Time Tracking:     OT Date of Treatment: 05/03/24  OT Start Time: 0948 (2nd start: 1032)  OT Stop Time: 1004 (2nd end: 1059)  OT Total Time (min): 16 min + 27 min = 43 min    Billable Minutes:Self Care/Home Management 25  Therapeutic Activity 18    OT/MARS: MARS     Number of MARS visits since last OT visit: 1    5/3/2024

## 2024-05-03 NOTE — PLAN OF CARE
In [facility], [facility], and [facility] stated they were out of network with patient's insurance     1321 CM call [facility] [phone] and spoke with Azalia. Call refernce number [phone] inquiring about list of SNF in network with patient's patient. CM was notified the following facilities are in-network with the patient's plan and the website of in-network is updated daily.    [facility],. [facility], [facility], [facility], [facility], [facility], [facility], [facility], [facility], and [facility].     CM advised  Azalia that referrals were sent [facility], [facility] and  [facility] and both facilities stated they were out of network. She stated that facilities should be in network.     CM called spoke with patient's nephew, Anjel via phone about declined  facilities. He will be at the hospital at 1500. CM will visit him in room to further review list of available SNF's.     1554   CM met with patient's nephew Anjel at bedside to review discharge recommendation of SNF and is agreeable to plan    Patient/family provided list of facilities in-network with patient's payor plan. Providers that are owned, operated, or affiliated with [facility] are included on the list.     Notified that referral sent to below listed facilities from in-network list based on proximity to home/family support:   1.[facility]   2. [facility]     Patient/family instructed to identify preference.    Preferred Facility: (if more than 1, listed in order of descending preference)  [facility]     If an additional preferred facility not listed above is identified, additional referral to be sent. If above facilities unable to accept, will send additional referrals to in-network providers.

## 2024-05-03 NOTE — TELEPHONE ENCOUNTER
----- Message from Alison Hoff PA-C sent at 5/3/2024  1:11 PM CDT -----  Hi this patient needs follow up in [facility] in 6 weeks with X-ray lumbar spine upright/supine. I will place the order. Thanks!

## 2024-05-03 NOTE — PROGRESS NOTES
Devin Rock - Neurosurgery ([facility])  [facility] Medicine  Progress Note    Patient Name: Nithin Wagner  MRN: [id number]  Patient Class: IP- Inpatient   Admission Date: 4/26/2024  Length of Stay: 5 days  Attending Physician: Sung Scherer MD  Primary Care Provider: Tushar Drew MD        Subjective:     Principal Problem:Intractable pain        HPI:  Pt is a 68yoM s/p s/p T11 and L3 kyphoplasty on 4/22/2024, now with worsening back pain and diarrhea. Has had multiple ED visits in the past week, CT A/P done yesterday showed Interval compression deformity involving the superior endplate of L1 as well as enteritis, he was discharged home on cipro/flagyl but never started taking them. Was seen by pain management and recommended admission for intractable pain.     in the ED, vital signs are stable, cbc/cmp unremarkable, ESR/CRP down trending.  patient has no red flags for infection/spinal involvement beyond pain and has reassuring neurological examination.  Patient has  required 2 mg of IV hydromorphone.      At bedside, complained of low back pain, denies worsening numbness in his legs, no fecal/urinary incontinence, no fevers, no night sweats. Complained of watery diarrhea for few days, he states that few of his friends have contracted a diarrheal illness as well. Diarrhea is associated with abdominal pain, no nausea/no vomiting. Hasn't been able to eat well and hasn't taking most of his medications in the past few days.     Overview/Hospital Course:  Per NSGY L-spine MRI (partially completed) read showed   acute L1 compression fx with mild retropulsion. No severe stenosis. post kyphopasty at T11, L3. C diff was negative, started on imodium. Pt reports improvement in diarrhea; 1 loose nonbloody bm since 4 am, not as watery anymore. CT chest neg for any definitive malignancy, has nonspecific lung nodules, outpt f/u.     5/2  s/p T11 and L3 kyphoplasty with pain management Dr. Aviles on 4/22/2024, now with worsening  back pain and diarrhea. Has had multiple ED visits in the past week, CT A/P and MRI show new acute compression fx of L1 with mild retropulsion. In significant pain but remains neuro intact. MRI L sp 4/27 (partially completed): acute L1 compression fx with mild retropulsion. No severe stenosis. S/p kyphopasty at T11, L3. CT chest malignancy workup completed, R upper lobe opacity. Read reports malignancy less likely. s/p NSGY Eval - multimodal pain control with narcotics, tylenol/NSAIDs, muscle relaxants. TLSO brace when upright/OOB. No clear evidence of pathological fracture. consider L1 kyphoplasty for ongoing pain in the future. Patient not interested at this time. needs Optimization of bone quality due to  osteopenia on DXA scan from 2023. awaiting PT session to see if pt can tolerate pain off dilaudid; otherwise  needs kyphoplasty. off  dilaudid and continue oxycodone 5/10mg q 4h PRN.continue tylenol 1000mg q8h, Gabapentin BID and robaxin. X-Ray lumbar lateral supine and upright to assess stability of fracture in the brace- Compression fractures of T11, L1 and L3, status post vertebroplasty at T11 and L3 .  needs SNF. received 3 doses of IV dilaudid 0.5mg . lidocaine patch  5/3 did not receive IV dilaudid today. B12 1500s and folate levels WNL        Review of Systems:   Pain scale:   Constitutional:  fever,  chills, headache, vision loss, hearing loss, weight loss, Generalized weakness, falls, loss of smell, loss of taste, poor appetite,  sore throat  Respiratory: cough, shortness of breath.   Cardiovascular: chest pain, dizziness, palpitations, orthopnea, swelling of feet, syncope  Gastrointestinal: nausea, vomiting, abdominal pain, diarrhea, black stool,  blood in stool, change in bowel habits, constipation  Genitourinary: hematuria, dysuria, urgency, frequency  Integument/Breast: rash,  pruritis  Hematologic/Lymphatic: easy bruising, lymphadenopathy  Musculoskeletal: arthralgias , myalgias,  back pain  -improved , neck pain, knee pain  Neurological: confusion, seizures, tremors, slurred speech  Behavioral/Psych:  depression, anxiety, auditory or visual hallucinations     OBJECTIVE:     Physical Exam:  Body mass index is 28.47 kg/m².    Constitutional: Appears well-developed and well-nourished.   Head: Normocephalic and atraumatic.   Neck: Normal range of motion. Neck supple.   Cardiovascular: Normal heart rate.  Regular heart rhythm.  Pulmonary/Chest: Effort normal.   Abdominal: No distension.  No tenderness  Musculoskeletal: Normal range of motion. + edema. lumbar tendenesss   Neurological: Alert and oriented to person, place, and time. able to move bilateral upper and lower extremities without limitation   Skin: Skin is warm and dry.   Psychiatric: Normal mood and affect. Behavior is normal.                  Vital Signs  Temp: 98.1 °F (36.7 °C) (05/03/24 1154)  Pulse: 97 (05/03/24 1154)  Resp: 18 (05/03/24 1202)  BP: (Abnormal) 154/87 (05/03/24 1154)  SpO2: (Abnormal) 93 % (05/03/24 1154)     24 Hour VS Range    Temp:  [97.9 °F (36.6 °C)-98.4 °F (36.9 °C)]   Pulse:  []   Resp:  [9-24]   BP: (129-154)/(66-91)   SpO2:  [92 %-95 %]     Intake/Output Summary (Last 24 hours) at 5/3/2024 1342  Last data filed at 5/2/2024 2300  Gross per 24 hour   Intake no documentation   Output 1250 ml   Net -1250 ml         I/O This Shift:  No intake/output data recorded.    Wt Readings from Last 3 Encounters:   04/30/24 92.6 kg (204 lb 2.3 oz)   04/26/24 91.3 kg (201 lb 4.5 oz)   04/25/24 99.8 kg (220 lb)       I have personally reviewed the vitals and recorded Intake/Output     Laboratory/Diagnostic Data:    CBC/Anemia Labs: Coags:    Recent Labs   Lab 04/28/24  0720 04/29/24  0242 05/03/24  0404 05/03/24  1034   WBC 9.07 9.45 6.71  --    HGB 16.0 15.4 15.0  --    HCT 48.0 45.4 46.9  --     262 242  --    * 100* 106*  --    RDW 12.1 12.1 12.2  --    FOLATE  --   --   --  12.4   [id number]  --   --   --  1517*  "   No results for input(s): "PT", "INR", "APTT" in the last 168 hours.     Chemistries: ABG:   Recent Labs   Lab 04/26/24  1709 04/27/24  0558 04/28/24  0720 04/29/24  0242 05/03/24  0404      < > 136 136 135*   K 3.8   < > 3.9 4.2 4.2      < > 106 104 102   CO2 16*   < > 18* 19* 23   BUN 9   < > 9 7* 7*   CREATININE 0.8   < > 0.7 0.7 0.6   CALCIUM 9.0   < > 8.6* 8.6* 8.2*   PROT 6.4   < > 5.7* 5.4* 5.4*   BILITOT 0.4   < > 0.3 0.4 0.3   ALKPHOS 138*   < > 123 109 108   ALT 10   < > 8* 8* 10   AST 13   < > 12 11 18   MG 1.7   < > 1.6 1.6 1.7   PHOS 3.4  --   --   --   --     < > = values in this interval not displayed.    No results for input(s): "PH", "PCO2", "PO2", "HCO3", "POCSATURATED", "BE" in the last 168 hours.     POCT Glucose: HbA1c:    Recent Labs   Lab 05/01/24  2109 05/02/24  0758 05/02/24  1217 05/02/24  2050 05/03/24  0842 05/03/24  1155   POCTGLUCOSE 203* 171* 181* 120* 158* 193*    Hemoglobin A1C   Date Value Ref Range Status   03/01/2024 7.4 (H) 4.0 - 5.6 % Final     Comment:     ADA Screening Guidelines:  5.7-6.4%  Consistent with prediabetes  >or=6.5%  Consistent with diabetes    High levels of fetal hemoglobin interfere with the HbA1C  assay. Heterozygous hemoglobin variants (HbS, HgC, etc)do  not significantly interfere with this assay.   However, presence of multiple variants may affect accuracy.     12/01/2023 7.0 (H) 4.0 - 5.6 % Final     Comment:     ADA Screening Guidelines:  5.7-6.4%  Consistent with prediabetes  >or=6.5%  Consistent with diabetes    High levels of fetal hemoglobin interfere with the HbA1C  assay. Heterozygous hemoglobin variants (HbS, HgC, etc)do  not significantly interfere with this assay.   However, presence of multiple variants may affect accuracy.     09/29/2023 7.1 (H) 4.0 - 5.6 % Final     Comment:     ADA Screening Guidelines:  5.7-6.4%  Consistent with prediabetes  >or=6.5%  Consistent with diabetes    High levels of fetal hemoglobin interfere with " "the HbA1C  assay. Heterozygous hemoglobin variants (HbS, HgC, etc)do  not significantly interfere with this assay.   However, presence of multiple variants may affect accuracy.          Cardiac Enzymes: Ejection Fractions:    No results for input(s): "CPK", "CPKMB", "MB", "TROPONINI" in the last 72 hours. EF   Date Value Ref Range Status   10/28/2022 55 % Final   12/22/2021 57 % Final          No results for input(s): "COLORU", "APPEARANCEUA", "PHUR", "SPECGRAV", "PROTEINUA", "GLUCUA", "KETONESU", "BILIRUBINUA", "OCCULTUA", "NITRITE", "UROBILINOGEN", "LEUKOCYTESUR", "RBCUA", "WBCUA", "BACTERIA", "SQUAMEPITHEL", "HYALINECASTS" in the last 48 hours.    Invalid input(s): "WRIGHTSUR"    Procalcitonin (ng/mL)   Date Value   01/06/2023 0.03   11/24/2022 0.19   11/15/2022 48.84 (H)     Lactate (Lactic Acid) (mmol/L)   Date Value   11/15/2022 2.2   11/15/2022 4.4 (HH)   10/26/2022 1.6   10/20/2022 1.9     BNP (pg/mL)   Date Value   04/26/2024 157 (H)   04/03/2024 177 (H)   02/09/2024 176 (H)   12/09/2022 83   11/15/2022 424 (H)     CRP (mg/L)   Date Value   04/26/2024 29.4 (H)   04/24/2024 63.0 (H)   01/06/2023 19.7 (H)   10/21/2022 8.8 (H)   10/21/2022 8.8 (H)   07/21/2022 0.4   04/22/2022 0.2   07/21/2021 0.8   11/24/2020 0.4   09/22/2020 0.6     Sed Rate (mm/Hr)   Date Value   04/26/2024 41 (H)   01/06/2023 78 (H)   10/21/2022 3   10/21/2022 3   07/21/2022 2   04/22/2022 2   07/21/2021 1   11/24/2020 2   09/22/2020 1   08/14/2020 2     D-Dimer (mg/L FEU)   Date Value   10/26/2022 0.40   11/10/2020 0.50 (H)   09/22/2020 0.45     Ferritin (ng/mL)   Date Value   05/05/2020 134   04/07/2020 163     LD (U/L)   Date Value   11/04/2021 260     Troponin I (ng/mL)   Date Value   04/26/2024 <0.006   02/09/2024 0.020   11/16/2022 0.357 (H)   11/15/2022 0.551 (H)   11/15/2022 0.455 (H)   10/26/2022 0.035 (H)   10/20/2022 0.016   10/20/2022 0.013     CPK (U/L)   Date Value   05/30/2022 68   03/10/2021 89   11/10/2020 133   12/22/2009 " 579 (H)     Results for orders placed or performed in visit on 09/29/23   Vitamin D   Result Value Ref Range    Vit D, 25-Hydroxy 30 30 - 96 ng/mL   Results for orders placed or performed in visit on 10/21/22   Vitamin D   Result Value Ref Range    Vit D, 25-Hydroxy 20 (L) 30 - 96 ng/mL     *Note: Due to a large number of results and/or encounters for the requested time period, some results have not been displayed. A complete set of results can be found in Results Review.     SARS-CoV2 (COVID-19) Qualitative PCR (no units)   Date Value   01/17/2023 Not Detected   01/13/2023 Not Detected   01/10/2023 Not Detected   01/06/2023 Not Detected   01/03/2023 Not Detected   12/29/2022 Not Detected   12/21/2022 Not Detected   12/12/2022 Not Detected   08/25/2021 Not Detected   03/07/2021 Not Detected     SARS-CoV-2 RNA, Amplification, Qual (no units)   Date Value   01/27/2023 Negative   10/20/2022 Negative     POC Rapid COVID (no units)   Date Value   02/09/2024 Negative   09/21/2023 Negative   10/26/2022 Negative   08/20/2021 Negative   08/18/2021 Negative       Microbiology labs for the last week  Microbiology Results (last 7 days)       Procedure Component Value Units Date/Time    Blood Culture #2 **CANNOT BE ORDERED STAT** [[id number]] Collected: 04/26/24 1712    Order Status: Completed Specimen: Blood from Peripheral, Antecubital, Right Updated: 05/01/24 1812     Blood Culture, Routine No growth after 5 days.    Blood Culture #1 **CANNOT BE ORDERED STAT** [[id number]] Collected: 04/26/24 1712    Order Status: Completed Specimen: Blood from Peripheral, Forearm, Left Updated: 05/01/24 1812     Blood Culture, Routine No growth after 5 days.    Clostridium difficile EIA [[id number]] Collected: 04/26/24 2317    Order Status: Completed Specimen: Stool Updated: 04/27/24 1140     C. diff Antigen Negative     C difficile Toxins A+B, EIA Negative     Comment: Testing not recommended for children <24 months old.                Reviewed and noted in plan where applicable- Please see chart for full lab data.    Lines/Drains:       Peripheral IV - Single Lumen 04/28/24 1026 22 G Posterior;Proximal;Right Forearm (Active)   Site Assessment Clean;Dry;Intact;No redness;No swelling 05/02/24 0400   Extremity Assessment Distal to IV No abnormal discoloration;No redness;No swelling;No warmth 05/02/24 0400   Line Status Flushed 05/02/24 0400   Dressing Status Clean;Dry;Intact 05/02/24 0400   Dressing Intervention Integrity maintained 05/02/24 0400   Dressing Change Due 05/02/24 05/02/24 0400   Site Change Due 05/02/24 05/02/24 0400   Reason Not Rotated Not due 05/02/24 0400   Number of days: 3       Imaging      Results for orders placed during the hospital encounter of 04/03/24    Echo    Interpretation Summary    Left Ventricle: The left ventricle is normal in size. Increased wall thickness. There is concentric remodeling. There is normal systolic function with a visually estimated ejection fraction of 55 - 60%. Grade I diastolic dysfunction.    Right Ventricle: Normal right ventricular cavity size. Wall thickness is normal. Right ventricle wall motion  is normal. Systolic function is normal.      X-Ray Lumbar Spine AP and Lateral, Upright  Narrative: EXAMINATION:  XR LUMBAR SPINE AP AND LATERAL  UPRIGHT    CLINICAL HISTORY:  Spine fracture, lumbar, pathological;    TECHNIQUE:  AP and lateral views of the lumbar spine.    COMPARISON:  03/01/2024, 04/30/2024    FINDINGS:  Mild compression fractures status post vertebroplasty at L3.  Moderate compression fracture at T11 status post vertebroplasty.    Moderate compression fracture of the superior endplate of L1.  Mild retropulsion is better visualized on prior MRI 04/30/2024.    Multilevel mild disc space narrowing.  Mild osteophytic spurring of the endplates at multiple levels.  Impression: Compression fractures of T11, L1 and L3, status post vertebroplasty at T11 and L3. see prior MRI report  "also.    Electronically signed by: Tushar May  Date:    2024  Time:    20:12      Labs, Imaging, EKG and Diagnostic results from 5/3/2024 were reviewed.    Medications:  Medication list was reviewed and changes noted under Assessment/Plan.  No current facility-administered medications on file prior to encounter.     Current Outpatient Medications on File Prior to Encounter   Medication Sig Dispense Refill    aspirin 81 MG Chew Take 81 mg by mouth.      BD ULTRA-FINE RAHUL PEN NEEDLE 32 gauge x 5/32" Ndle To use 6 daily 400 each 3    carvediloL (COREG) 6.25 MG tablet Take 1 tablet (6.25 mg total) by mouth 2 (two) times daily with meals. 180 tablet 3    [] ciprofloxacin HCl (CIPRO) 500 MG tablet Take 1 tablet (500 mg total) by mouth 2 (two) times daily. for 7 days 14 tablet 0    digoxin (LANOXIN) 125 mcg tablet TAKE 1 TABLET BY MOUTH ONCE DAILY. 90 tablet 3    diltiaZEM (CARDIZEM CD) 120 MG Cp24 Take 120 mg by mouth Daily.      ergocalciferol (ERGOCALCIFEROL) 50,000 unit Cap Take 1 capsule (50,000 Units total) by mouth every 7 days. 12 capsule 3    EScitalopram oxalate (LEXAPRO) 20 MG tablet Take 20 mg by mouth every evening.      folic acid (FOLVITE) 1 MG tablet Take 1 tablet (1 mg total) by mouth once daily. 30 tablet 5    furosemide (LASIX) 80 MG tablet Take 1 tablet (80 mg total) by mouth 2 (two) times a day. 180 tablet 3    gabapentin (NEURONTIN) 300 MG capsule 2 (two) times daily.      HYDROmorphone (DILAUDID) 4 MG tablet TAKE 1½ Tablets by mouth EVERY 6 HOURS as needed for SEVERE PAIN 60 tablet 0    HYDROmorphone (DILAUDID) 4 MG tablet TAKE ONE TABLET BY MOUTH EVERY 6 HOURS AS NEEDED FOR SEVERE PAIN 4 tablet 0    HYDROmorphone (DILAUDID) 4 MG tablet TAKE ONE TAB PO Q 6 HOURS PRN SEVERE PAIN 4 tablet 0    insulin aspart, niacinamide, (FIASP PENFILL U-100 INSULIN) 100 unit/mL (3 mL) Crtg pen To use with inPen; 140 max daily dose. 14 pen 11    insulin degludec (TRESIBA FLEXTOUCH U-200) 200 unit/mL " (3 mL) insulin pen Inject 60 Units into the skin once daily. Cartridge for novoecho (Patient taking differently: Inject 40 Units into the skin once daily. Cartridge for novoecho) 3 pen 11    ketoconazole (NIZORAL) 2 % cream Apply to affected area DAILY 30 g 3    lamiVUDine (EPIVIR) 150 MG Tab TAKE 1 TABLET BY MOUTH EVERY DAY 30 tablet 23    metoprolol tartrate (LOPRESSOR) 50 MG tablet Take 50 mg by mouth every 12 (twelve) hours.      [] metroNIDAZOLE (FLAGYL) 500 MG tablet Take 1 tablet (500 mg total) by mouth every 12 (twelve) hours. for 7 days 14 tablet 0    moxifloxacin (VIGAMOX) 0.5 % ophthalmic solution Place 1 drop into the right eye 3 (three) times daily. 3 mL 3    multivitamin (THERAGRAN) per tablet Take 1 tablet by mouth once daily.      mupirocin (BACTROBAN) 2 % ointment Apply to affected area 3 times daily 22 g 1    nitroGLYCERIN (NITROSTAT) 0.4 MG SL tablet PLACE 1 TABLET UNDER THE TONGUE EVERY 5 MINUTES AS NEEDED FOR CHEST PAIN. 100 tablet 2    nystatin (MYCOSTATIN) powder Apply topically 4 (four) times daily. Apply to intertriginous areas as directed up to four times daily to reduce moisture. for 14 days 30 g 0    olmesartan (BENICAR) 40 MG tablet TAKE 1 TABLET BY MOUTH EVERY DAY AT NIGHT 90 tablet 3    oxyCODONE-acetaminophen (PERCOCET) 5-325 mg per tablet Take 1 tablet by mouth every 6 (six) hours as needed for Pain. 12 tablet 0    prednisoLONE acetate (PRED FORTE) 1 % DrpS Place 1 drop into the right eye 3 (three) times daily. 10 mL 3    predniSONE (DELTASONE) 2.5 MG tablet TAKE 5 TABLETS (12.5 MG TOTAL) BY MOUTH ONCE DAILY. 150 tablet 2    predniSONE (DELTASONE) 5 MG tablet TAKE 2 TABLETS BY MOUTH ONCE DAILY. 60 tablet 3    PROAIR HFA 90 mcg/actuation inhaler       tirzepatide 7.5 mg/0.5 mL PnIj Inject 7.5 mg into the skin every 7 days. (Patient taking differently: Inject 7.5 mg into the skin every 7 days. , LAST DOSE 24) 4 Pen 11    tiZANidine (ZANAFLEX) 4 MG tablet TAKE 1  TABLET BY MOUTH EVERY 8 HOURS AS NEEDED FOR PAIN 30 tablet 2    tocilizumab 162 mg/0.9 mL PnIj Inject 162 mg into the skin once a week. 4 mL 0    tocilizumab 162 mg/0.9 mL PnIj Inject 162 mg into the skin once a week. 4 mL 6    walker Misc 1 Units by Misc.(Non-Drug; Combo Route) route daily as needed (Rollator Walker). 1 each 0     Scheduled Medications:  Current Facility-Administered Medications   Medication Dose Route Frequency    acetaminophen  1,000 mg Oral Q8H    digoxin  0.125 mg Oral Daily    enoxparin  40 mg Subcutaneous Daily    EScitalopram oxalate  20 mg Oral QHS    gabapentin  400 mg Oral TID    Lactobacillus rhamnosus GG  1 capsule Oral Daily    LIDOcaine  1 patch Transdermal Q24H    methocarbamol (ROBAXIN) IVPB  1,000 mg Intravenous Q8H    polyethylene glycol  17 g Oral BID     PRN:   Current Facility-Administered Medications:     dextrose 10%, 12.5 g, Intravenous, PRN    dextrose 10%, 25 g, Intravenous, PRN    glucagon (human recombinant), 1 mg, Intramuscular, PRN    glucose, 16 g, Oral, PRN    glucose, 24 g, Oral, PRN    insulin aspart U-100, 0-5 Units, Subcutaneous, QID (AC + HS) PRN    naloxone, 0.02 mg, Intravenous, PRN    ondansetron, 4 mg, Intravenous, Q8H PRN    oxyCODONE, 5 mg, Oral, Q4H PRN    oxyCODONE, 10 mg, Oral, Q4H PRN    sodium chloride 0.9%, 10 mL, Intravenous, Q12H PRN  Infusions:   Current Facility-Administered Medications   Medication Dose Route Frequency Last Rate Last Admin     Estimated Creatinine Clearance: 133.2 mL/min (based on SCr of 0.6 mg/dL).             Assessment/Plan:      * Intractable pain  -Due to new L1 fracture   -Hydromorphone 1 mg IV q4h prn for severe pain  -Oxycodone 10 mg q4h prn for moderate pain   -Per spine surgery  MRI L sp 4/27 (partially completed): acute L1 compression fx with mild retropulsion. No severe stenosis. post kyphopasty at T11, L3   -Xrays pending now TSLO brace  -Repeat MRI w/ contrast w/ sedation neg for path fracture;  -chest CT neg for  any definitive malignancy ; has lung nodules - pt rec'd outpt f/u.  -will await therapy session w/ PT to see if pt's pain is controlled or needs kyphoplasty.      5/2 awaiting PT session to see if pt can tolerate pain off dilaudid; otherwise  needs kyphoplasty. off  dilaudid and continue oxycodone 5/10mg q 4h PRN.continue tylenol 1000mg q8h, Gabapentin BID and robaxin. needs SNF        Constipation  started on miralax BID      Nephrolithiasis  . Bilateral nonobstructive nephrolithiasis. UA negative       Enteritis  improved as above       Diarrhea of presumed infectious origin  C diff testing negative  Afebrile; no abx needed at this time  Seems resolved now  CT showing   Liquid stool throughout the large bowel and within several small bowel loops, taken together, suggests diarrheal illness possibly in the setting of enteritis. And Left perinephric fat stranding noting somewhat delayed excretion of contrast by the left kidney.     Imodium prn   probiotic  CRP is downtrending      Lumbar vertebral fracture    5/2  s/p T11 and L3 kyphoplasty with pain management Dr. Aviles on 4/22/2024, now with worsening back pain and diarrhea. Has had multiple ED visits in the past week, CT A/P and MRI show new acute compression fx of L1 with mild retropulsion. In significant pain but remains neuro intact. MRI L sp 4/27 (partially completed): acute L1 compression fx with mild retropulsion. No severe stenosis. S/p kyphopasty at T11, L3. CT chest malignancy workup completed, R upper lobe opacity. Read reports malignancy less likely. s/p NSGY Eval - multimodal pain control with narcotics, tylenol/NSAIDs, muscle relaxants. TLSO brace when upright/OOB. No clear evidence of pathological fracture. consider L1 kyphoplasty for ongoing pain in the future. Patient not interested at this time. needs Optimization of bone quality due to  osteopenia on DXA scan from 2023. awaiting PT session to see if pt can tolerate pain off dilaudid; otherwise   needs kyphoplasty. off  dilaudid and continue oxycodone 5/10mg q 4h PRN.continue tylenol 1000mg q8h, Gabapentin BID and robaxin. needs SNF      Rheumatoid arthritis involving multiple sites with positive rheumatoid factor  Hx of RA on tocilizumab, last in march 2024, supposed to be on lamivudine for hep B ppx    Supraventricular tachycardia  Hx of SVT on digoxin, coreg but hasn't been taking coreg    Continue digoxin for now   Will give IV magnesium 2gm  Optimize potassium > 4    Coronary artery disease  Hx of CAD  Hold ASA for now     Type 2 diabetes mellitus with hyperglycemia, with long-term current use of insulin  Hasn't been taking insulin  Will hold off on scheduled insulin  Monitor glucose AC/HS and utilize prn insulin     Patient's FSGs are controlled on current hypoglycemics.   Last A1c reviewed-   Lab Results   Component Value Date    HGBA1C 7.4 (H) 03/01/2024    HGBA1C 7.0 (H) 12/01/2023    HGBA1C 7.1 (H) 09/29/2023     Will hold PO hypoglycemics and will start correctional scale insulin  Most recent fingerstick glucose reviewed-   Recent Labs   Lab 05/02/24  1217 05/02/24 2050   POCTGLUCOSE 181* 120*     currently on   Antihyperglycemics (From admission, onward)      Start     Stop Route Frequency Ordered    04/27/24 0133  insulin aspart U-100 pen 0-5 Units         -- SubQ Before meals & nightly PRN 04/27/24 0133             Chronic diastolic heart failure  Hasn't been on lasix   Currently hypovolemic due to diarrhea, s/p 1 L of IVF in the ED  Will encourage PO hydration and utilize IV fluids if needed     Echo    Interpretation Summary    Left Ventricle: The left ventricle is normal in size. Increased wall thickness. There is concentric remodeling. There is normal systolic function with a visually estimated ejection fraction of 55 - 60%. Grade I diastolic dysfunction.    Right Ventricle: Normal right ventricular cavity size. Wall thickness is normal. Right ventricle wall motion  is normal. Systolic  function is normal.         Hepatitis B core antibody positive  Patient is supposed to be on ppx with lamivudine but hasn't been taking it for few weeks  Will hold off resuming it for now, needs to be resumed at discharge         VTE Risk Mitigation (From admission, onward)           Ordered     enoxaparin injection 40 mg  Daily         04/27/24 0133     IP VTE HIGH RISK PATIENT  Once         04/27/24 0133     Place sequential compression device  Until discontinued         04/27/24 0133                    Discharge Planning   ALEXANDER: 5/7/2024     Code Status: Full Code   Is the patient medically ready for discharge?: (No Documentation)    Reason for patient still in hospital (select all that apply): Treatment  Discharge Plan A: Skilled Nursing Facility                  Sung Scherer MD  Department of Hospital Medicine   [facility] - Neurosurgery ([facility])

## 2024-05-03 NOTE — CARE UPDATE
I have reviewed the chart of Nithin Wagner who is hospitalized for the following:    Active Hospital Problems    Diagnosis    *Intractable pain    Depression    Constipation    Enteritis    Nephrolithiasis    Lumbar vertebral fracture    Diarrhea of presumed infectious origin    Hyperlipidemia    Anxiety    Rheumatoid arthritis involving multiple sites with positive rheumatoid factor    Hyponatremia     Monitor with daily cmp  Replace as needed      Supraventricular tachycardia    Coronary artery disease    Tobacco use disorder    Type 2 diabetes mellitus with hyperglycemia, with long-term current use of insulin    Chronic diastolic heart failure     Grade I diastolic dysfunction.   Follow up with cardiology      Essential hypertension    Hepatitis B core antibody positive        Bertha Mixon NP  [facility]

## 2024-05-04 LAB
POCT GLUCOSE: 149 MG/DL (ref 70–110)
POCT GLUCOSE: 159 MG/DL (ref 70–110)
POCT GLUCOSE: 160 MG/DL (ref 70–110)
POCT GLUCOSE: 192 MG/DL (ref 70–110)
POCT GLUCOSE: 209 MG/DL (ref 70–110)

## 2024-05-04 PROCEDURE — 25000003 PHARM REV CODE 250: Performed by: INTERNAL MEDICINE

## 2024-05-04 PROCEDURE — 25000003 PHARM REV CODE 250: Performed by: HOSPITALIST

## 2024-05-04 PROCEDURE — 11000001 HC ACUTE MED/SURG PRIVATE ROOM

## 2024-05-04 PROCEDURE — 63600175 PHARM REV CODE 636 W HCPCS: Performed by: INTERNAL MEDICINE

## 2024-05-04 PROCEDURE — 63600175 PHARM REV CODE 636 W HCPCS: Performed by: HOSPITALIST

## 2024-05-04 PROCEDURE — 25000003 PHARM REV CODE 250

## 2024-05-04 RX ADMIN — OXYCODONE HYDROCHLORIDE 10 MG: 10 TABLET ORAL at 10:05

## 2024-05-04 RX ADMIN — ENOXAPARIN SODIUM 40 MG: 40 INJECTION SUBCUTANEOUS at 05:05

## 2024-05-04 RX ADMIN — DIGOXIN 0.12 MG: 125 TABLET ORAL at 09:05

## 2024-05-04 RX ADMIN — ACETAMINOPHEN 1000 MG: 500 TABLET ORAL at 06:05

## 2024-05-04 RX ADMIN — ESCITALOPRAM OXALATE 20 MG: 20 TABLET, FILM COATED ORAL at 09:05

## 2024-05-04 RX ADMIN — OXYCODONE HYDROCHLORIDE 10 MG: 10 TABLET ORAL at 06:05

## 2024-05-04 RX ADMIN — GABAPENTIN 400 MG: 400 CAPSULE ORAL at 02:05

## 2024-05-04 RX ADMIN — OXYCODONE HYDROCHLORIDE 10 MG: 10 TABLET ORAL at 09:05

## 2024-05-04 RX ADMIN — Medication 1 CAPSULE: at 09:05

## 2024-05-04 RX ADMIN — GABAPENTIN 400 MG: 400 CAPSULE ORAL at 09:05

## 2024-05-04 RX ADMIN — OXYCODONE HYDROCHLORIDE 10 MG: 10 TABLET ORAL at 02:05

## 2024-05-04 RX ADMIN — METHOCARBAMOL 1000 MG: 100 INJECTION, SOLUTION INTRAMUSCULAR; INTRAVENOUS at 06:05

## 2024-05-04 RX ADMIN — ACETAMINOPHEN 1000 MG: 500 TABLET ORAL at 02:05

## 2024-05-04 RX ADMIN — ACETAMINOPHEN 1000 MG: 500 TABLET ORAL at 09:05

## 2024-05-04 NOTE — NURSING
..Nurses Note -- 4 Eyes      5/3/2024   7:37 PM      Skin assessed during: Q Shift Change      [x] No Altered Skin Integrity Present    [x]Prevention Measures Documented      [] Yes- Altered Skin Integrity Present or Discovered   [] LDA Added if Not in Epic (Describe Wound)   [] New Altered Skin Integrity was Present on Admit and Documented in LDA   [] Wound Image Taken    Wound Care Consulted? No    Attending Nurse:  Janel Owens RN/Staff Member:  Tiffany

## 2024-05-04 NOTE — PROGRESS NOTES
[facility] - Neurosurgery ([facility])  [facility] Medicine  Progress Note    Patient Name: Nithin Wagner  MRN: [id number]  Patient Class: IP- Inpatient   Admission Date: 4/26/2024  Length of Stay: 6 days  Attending Physician: Sung Scherer MD  Primary Care Provider: Tushar Drew MD        Subjective:     Principal Problem:Intractable pain        HPI:  Pt is a 68yoM s/p s/p T11 and L3 kyphoplasty on 4/22/2024, now with worsening back pain and diarrhea. Has had multiple ED visits in the past week, CT A/P done yesterday showed Interval compression deformity involving the superior endplate of L1 as well as enteritis, he was discharged home on cipro/flagyl but never started taking them. Was seen by pain management and recommended admission for intractable pain.     in the ED, vital signs are stable, cbc/cmp unremarkable, ESR/CRP down trending.  patient has no red flags for infection/spinal involvement beyond pain and has reassuring neurological examination.  Patient has  required 2 mg of IV hydromorphone.      At bedside, complained of low back pain, denies worsening numbness in his legs, no fecal/urinary incontinence, no fevers, no night sweats. Complained of watery diarrhea for few days, he states that few of his friends have contracted a diarrheal illness as well. Diarrhea is associated with abdominal pain, no nausea/no vomiting. Hasn't been able to eat well and hasn't taking most of his medications in the past few days.     Overview/Hospital Course:  Per NSGY L-spine MRI (partially completed) read showed   acute L1 compression fx with mild retropulsion. No severe stenosis. post kyphopasty at T11, L3. C diff was negative, started on imodium. Pt reports improvement in diarrhea; 1 loose nonbloody bm since 4 am, not as watery anymore. CT chest neg for any definitive malignancy, has nonspecific lung nodules, outpt f/u.     5/2  s/p T11 and L3 kyphoplasty with pain management Dr. Aviles on 4/22/2024, now with worsening  back pain and diarrhea. Has had multiple ED visits in the past week, CT A/P and MRI show new acute compression fx of L1 with mild retropulsion. In significant pain but remains neuro intact. MRI L sp 4/27 (partially completed): acute L1 compression fx with mild retropulsion. No severe stenosis. S/p kyphopasty at T11, L3. CT chest malignancy workup completed, R upper lobe opacity. Read reports malignancy less likely. s/p NSGY Eval - multimodal pain control with narcotics, tylenol/NSAIDs, muscle relaxants. TLSO brace when upright/OOB. No clear evidence of pathological fracture. consider L1 kyphoplasty for ongoing pain in the future. Patient not interested at this time. needs Optimization of bone quality due to  osteopenia on DXA scan from 2023. awaiting PT session to see if pt can tolerate pain off dilaudid; otherwise  needs kyphoplasty. off  dilaudid and continue oxycodone 5/10mg q 4h PRN.continue tylenol 1000mg q8h, Gabapentin BID and robaxin. X-Ray lumbar lateral supine and upright to assess stability of fracture in the brace- Compression fractures of T11, L1 and L3, status post vertebroplasty at T11 and L3 .  needs SNF. received 3 doses of IV dilaudid 0.5mg . lidocaine patch  5/3 did not receive IV dilaudid today. B12 1500s and folate levels WNL  5/4 tolerating pain with oral oxycodone 10mg q 4h prn. completed robaxin        Review of Systems:   Pain scale:   Constitutional:  fever,  chills, headache, vision loss, hearing loss, weight loss, Generalized weakness, falls, loss of smell, loss of taste, poor appetite,  sore throat  Respiratory: cough, shortness of breath.   Cardiovascular: chest pain, dizziness, palpitations, orthopnea, swelling of feet, syncope  Gastrointestinal: nausea, vomiting, abdominal pain, diarrhea, black stool,  blood in stool, change in bowel habits, constipation  Genitourinary: hematuria, dysuria, urgency, frequency  Integument/Breast: rash,  pruritis  Hematologic/Lymphatic: easy bruising,  lymphadenopathy  Musculoskeletal: arthralgias , myalgias,  back pain -improved , neck pain, knee pain  Neurological: confusion, seizures, tremors, slurred speech  Behavioral/Psych:  depression, anxiety, auditory or visual hallucinations     OBJECTIVE:     Physical Exam:  Body mass index is 28.47 kg/m².    Constitutional: Appears well-developed and well-nourished.   Head: Normocephalic and atraumatic.   Neck: Normal range of motion. Neck supple.   Cardiovascular: Normal heart rate.  Regular heart rhythm.  Pulmonary/Chest: Effort normal.   Abdominal: No distension.  No tenderness  Musculoskeletal: Normal range of motion. + edema. lumbar tendenesss   Neurological: Alert and oriented to person, place, and time. able to move bilateral upper and lower extremities without limitation   Skin: Skin is warm and dry.   Psychiatric: Normal mood and affect. Behavior is normal.                  Vital Signs  Temp: 98 °F (36.7 °C) (05/04/24 1054)  Pulse: 99 (05/04/24 1054)  Resp: 18 (05/04/24 1054)  BP: (Abnormal) 157/85 (05/04/24 1054)  SpO2: (Abnormal) 93 % (05/04/24 1054)     24 Hour VS Range    Temp:  [97.5 °F (36.4 °C)-98.7 °F (37.1 °C)]   Pulse:  []   Resp:  [17-20]   BP: (132-162)/(78-92)   SpO2:  [92 %-97 %]     Intake/Output Summary (Last 24 hours) at 5/4/2024 1319  Last data filed at 5/4/2024 0617  Gross per 24 hour   Intake no documentation   Output 2800 ml   Net -2800 ml         I/O This Shift:  No intake/output data recorded.    Wt Readings from Last 3 Encounters:   04/30/24 92.6 kg (204 lb 2.3 oz)   04/26/24 91.3 kg (201 lb 4.5 oz)   04/25/24 99.8 kg (220 lb)       I have personally reviewed the vitals and recorded Intake/Output     Laboratory/Diagnostic Data:    CBC/Anemia Labs: Coags:    Recent Labs   Lab 04/28/24  0720 04/29/24  0242 05/03/24  0404 05/03/24  1034   WBC 9.07 9.45 6.71  --    HGB 16.0 15.4 15.0  --    HCT 48.0 45.4 46.9  --     262 242  --    * 100* 106*  --    RDW 12.1 12.1 12.2   "--    FOLATE  --   --   --  12.4   [id number]  --   --   --  1517*    No results for input(s): "PT", "INR", "APTT" in the last 168 hours.     Chemistries: ABG:   Recent Labs   Lab 04/28/24  0720 04/29/24  0242 05/03/24  0404    136 135*   K 3.9 4.2 4.2    104 102   CO2 18* 19* 23   BUN 9 7* 7*   CREATININE 0.7 0.7 0.6   CALCIUM 8.6* 8.6* 8.2*   PROT 5.7* 5.4* 5.4*   BILITOT 0.3 0.4 0.3   ALKPHOS 123 109 108   ALT 8* 8* 10   AST 12 11 18   MG 1.6 1.6 1.7    No results for input(s): "PH", "PCO2", "PO2", "HCO3", "POCSATURATED", "BE" in the last 168 hours.     POCT Glucose: HbA1c:    Recent Labs   Lab 05/03/24  0842 05/03/24  1155 05/03/24  1628 05/04/24  0609 05/04/24  0740 05/04/24  1054   POCTGLUCOSE 158* 193* 153* 149* 160* 209*    Hemoglobin A1C   Date Value Ref Range Status   03/01/2024 7.4 (H) 4.0 - 5.6 % Final     Comment:     ADA Screening Guidelines:  5.7-6.4%  Consistent with prediabetes  >or=6.5%  Consistent with diabetes    High levels of fetal hemoglobin interfere with the HbA1C  assay. Heterozygous hemoglobin variants (HbS, HgC, etc)do  not significantly interfere with this assay.   However, presence of multiple variants may affect accuracy.     12/01/2023 7.0 (H) 4.0 - 5.6 % Final     Comment:     ADA Screening Guidelines:  5.7-6.4%  Consistent with prediabetes  >or=6.5%  Consistent with diabetes    High levels of fetal hemoglobin interfere with the HbA1C  assay. Heterozygous hemoglobin variants (HbS, HgC, etc)do  not significantly interfere with this assay.   However, presence of multiple variants may affect accuracy.     09/29/2023 7.1 (H) 4.0 - 5.6 % Final     Comment:     ADA Screening Guidelines:  5.7-6.4%  Consistent with prediabetes  >or=6.5%  Consistent with diabetes    High levels of fetal hemoglobin interfere with the HbA1C  assay. Heterozygous hemoglobin variants (HbS, HgC, etc)do  not significantly interfere with this assay.   However, presence of multiple variants may affect " "accuracy.          Cardiac Enzymes: Ejection Fractions:    No results for input(s): "CPK", "CPKMB", "MB", "TROPONINI" in the last 72 hours. EF   Date Value Ref Range Status   10/28/2022 55 % Final   12/22/2021 57 % Final          No results for input(s): "COLORU", "APPEARANCEUA", "PHUR", "SPECGRAV", "PROTEINUA", "GLUCUA", "KETONESU", "BILIRUBINUA", "OCCULTUA", "NITRITE", "UROBILINOGEN", "LEUKOCYTESUR", "RBCUA", "WBCUA", "BACTERIA", "SQUAMEPITHEL", "HYALINECASTS" in the last 48 hours.    Invalid input(s): "WRIGHTSUR"    Procalcitonin (ng/mL)   Date Value   01/06/2023 0.03   11/24/2022 0.19   11/15/2022 48.84 (H)     Lactate (Lactic Acid) (mmol/L)   Date Value   11/15/2022 2.2   11/15/2022 4.4 (HH)   10/26/2022 1.6   10/20/2022 1.9     BNP (pg/mL)   Date Value   04/26/2024 157 (H)   04/03/2024 177 (H)   02/09/2024 176 (H)   12/09/2022 83   11/15/2022 424 (H)     CRP (mg/L)   Date Value   04/26/2024 29.4 (H)   04/24/2024 63.0 (H)   01/06/2023 19.7 (H)   10/21/2022 8.8 (H)   10/21/2022 8.8 (H)   07/21/2022 0.4   04/22/2022 0.2   07/21/2021 0.8   11/24/2020 0.4   09/22/2020 0.6     Sed Rate (mm/Hr)   Date Value   04/26/2024 41 (H)   01/06/2023 78 (H)   10/21/2022 3   10/21/2022 3   07/21/2022 2   04/22/2022 2   07/21/2021 1   11/24/2020 2   09/22/2020 1   08/14/2020 2     D-Dimer (mg/L FEU)   Date Value   10/26/2022 0.40   11/10/2020 0.50 (H)   09/22/2020 0.45     Ferritin (ng/mL)   Date Value   05/05/2020 134   04/07/2020 163     LD (U/L)   Date Value   11/04/2021 260     Troponin I (ng/mL)   Date Value   04/26/2024 <0.006   02/09/2024 0.020   11/16/2022 0.357 (H)   11/15/2022 0.551 (H)   11/15/2022 0.455 (H)   10/26/2022 0.035 (H)   10/20/2022 0.016   10/20/2022 0.013     CPK (U/L)   Date Value   05/30/2022 68   03/10/2021 89   11/10/2020 133   12/22/2009 579 (H)     Results for orders placed or performed in visit on 09/29/23   Vitamin D   Result Value Ref Range    Vit D, 25-Hydroxy 30 30 - 96 ng/mL   Results for " orders placed or performed in visit on 10/21/22   Vitamin D   Result Value Ref Range    Vit D, 25-Hydroxy 20 (L) 30 - 96 ng/mL     *Note: Due to a large number of results and/or encounters for the requested time period, some results have not been displayed. A complete set of results can be found in Results Review.     SARS-CoV2 (COVID-19) Qualitative PCR (no units)   Date Value   01/17/2023 Not Detected   01/13/2023 Not Detected   01/10/2023 Not Detected   01/06/2023 Not Detected   01/03/2023 Not Detected   12/29/2022 Not Detected   12/21/2022 Not Detected   12/12/2022 Not Detected   08/25/2021 Not Detected   03/07/2021 Not Detected     SARS-CoV-2 RNA, Amplification, Qual (no units)   Date Value   01/27/2023 Negative   10/20/2022 Negative     POC Rapid COVID (no units)   Date Value   02/09/2024 Negative   09/21/2023 Negative   10/26/2022 Negative   08/20/2021 Negative   08/18/2021 Negative       Microbiology labs for the last week  Microbiology Results (last 7 days)       Procedure Component Value Units Date/Time    Blood Culture #2 **CANNOT BE ORDERED STAT** [[id number]] Collected: 04/26/24 1712    Order Status: Completed Specimen: Blood from Peripheral, Antecubital, Right Updated: 05/01/24 1812     Blood Culture, Routine No growth after 5 days.    Blood Culture #1 **CANNOT BE ORDERED STAT** [[id number]] Collected: 04/26/24 1712    Order Status: Completed Specimen: Blood from Peripheral, Forearm, Left Updated: 05/01/24 1812     Blood Culture, Routine No growth after 5 days.            Reviewed and noted in plan where applicable- Please see chart for full lab data.    Lines/Drains:       Peripheral IV - Single Lumen 04/28/24 1026 22 G Posterior;Proximal;Right Forearm (Active)   Site Assessment Clean;Dry;Intact;No redness;No swelling 05/02/24 0400   Extremity Assessment Distal to IV No abnormal discoloration;No redness;No swelling;No warmth 05/02/24 0400   Line Status Flushed 05/02/24 0400   Dressing Status  Clean;Dry;Intact 05/02/24 0400   Dressing Intervention Integrity maintained 05/02/24 0400   Dressing Change Due 05/02/24 05/02/24 0400   Site Change Due 05/02/24 05/02/24 0400   Reason Not Rotated Not due 05/02/24 0400   Number of days: 3       Imaging      Results for orders placed during the hospital encounter of 04/03/24    Echo    Interpretation Summary    Left Ventricle: The left ventricle is normal in size. Increased wall thickness. There is concentric remodeling. There is normal systolic function with a visually estimated ejection fraction of 55 - 60%. Grade I diastolic dysfunction.    Right Ventricle: Normal right ventricular cavity size. Wall thickness is normal. Right ventricle wall motion  is normal. Systolic function is normal.      X-Ray Lumbar Spine AP and Lateral, Upright  Narrative: EXAMINATION:  XR LUMBAR SPINE AP AND LATERAL  UPRIGHT    CLINICAL HISTORY:  Spine fracture, lumbar, pathological;    TECHNIQUE:  AP and lateral views of the lumbar spine.    COMPARISON:  03/01/2024, 04/30/2024    FINDINGS:  Mild compression fractures status post vertebroplasty at L3.  Moderate compression fracture at T11 status post vertebroplasty.    Moderate compression fracture of the superior endplate of L1.  Mild retropulsion is better visualized on prior MRI 04/30/2024.    Multilevel mild disc space narrowing.  Mild osteophytic spurring of the endplates at multiple levels.  Impression: Compression fractures of T11, L1 and L3, status post vertebroplasty at T11 and L3. see prior MRI report also.    Electronically signed by: Tushar May  Date:    05/01/2024  Time:    20:12      Labs, Imaging, EKG and Diagnostic results from 5/4/2024 were reviewed.    Medications:  Medication list was reviewed and changes noted under Assessment/Plan.  No current facility-administered medications on file prior to encounter.     Current Outpatient Medications on File Prior to Encounter   Medication Sig Dispense Refill    aspirin 81 MG  "Chew Take 81 mg by mouth.      BD ULTRA-FINE RAHUL PEN NEEDLE 32 gauge x 5/32" Ndle To use 6 daily 400 each 3    carvediloL (COREG) 6.25 MG tablet Take 1 tablet (6.25 mg total) by mouth 2 (two) times daily with meals. 180 tablet 3    digoxin (LANOXIN) 125 mcg tablet TAKE 1 TABLET BY MOUTH ONCE DAILY. 90 tablet 3    diltiaZEM (CARDIZEM CD) 120 MG Cp24 Take 120 mg by mouth Daily.      ergocalciferol (ERGOCALCIFEROL) 50,000 unit Cap Take 1 capsule (50,000 Units total) by mouth every 7 days. 12 capsule 3    EScitalopram oxalate (LEXAPRO) 20 MG tablet Take 20 mg by mouth every evening.      folic acid (FOLVITE) 1 MG tablet Take 1 tablet (1 mg total) by mouth once daily. 30 tablet 5    furosemide (LASIX) 80 MG tablet Take 1 tablet (80 mg total) by mouth 2 (two) times a day. 180 tablet 3    gabapentin (NEURONTIN) 300 MG capsule 2 (two) times daily.      HYDROmorphone (DILAUDID) 4 MG tablet TAKE 1½ Tablets by mouth EVERY 6 HOURS as needed for SEVERE PAIN 60 tablet 0    HYDROmorphone (DILAUDID) 4 MG tablet TAKE ONE TABLET BY MOUTH EVERY 6 HOURS AS NEEDED FOR SEVERE PAIN 4 tablet 0    HYDROmorphone (DILAUDID) 4 MG tablet TAKE ONE TAB PO Q 6 HOURS PRN SEVERE PAIN 4 tablet 0    insulin aspart, niacinamide, (FIASP PENFILL U-100 INSULIN) 100 unit/mL (3 mL) Crtg pen To use with inPen; 140 max daily dose. 14 pen 11    insulin degludec (TRESIBA FLEXTOUCH U-200) 200 unit/mL (3 mL) insulin pen Inject 60 Units into the skin once daily. Cartridge for novoecho (Patient taking differently: Inject 40 Units into the skin once daily. Cartridge for novoecho) 3 pen 11    ketoconazole (NIZORAL) 2 % cream Apply to affected area DAILY 30 g 3    lamiVUDine (EPIVIR) 150 MG Tab TAKE 1 TABLET BY MOUTH EVERY DAY 30 tablet 23    metoprolol tartrate (LOPRESSOR) 50 MG tablet Take 50 mg by mouth every 12 (twelve) hours.      moxifloxacin (VIGAMOX) 0.5 % ophthalmic solution Place 1 drop into the right eye 3 (three) times daily. 3 mL 3    multivitamin " (THERAGRAN) per tablet Take 1 tablet by mouth once daily.      mupirocin (BACTROBAN) 2 % ointment Apply to affected area 3 times daily 22 g 1    nitroGLYCERIN (NITROSTAT) 0.4 MG SL tablet PLACE 1 TABLET UNDER THE TONGUE EVERY 5 MINUTES AS NEEDED FOR CHEST PAIN. 100 tablet 2    nystatin (MYCOSTATIN) powder Apply topically 4 (four) times daily. Apply to intertriginous areas as directed up to four times daily to reduce moisture. for 14 days 30 g 0    olmesartan (BENICAR) 40 MG tablet TAKE 1 TABLET BY MOUTH EVERY DAY AT NIGHT 90 tablet 3    oxyCODONE-acetaminophen (PERCOCET) 5-325 mg per tablet Take 1 tablet by mouth every 6 (six) hours as needed for Pain. 12 tablet 0    prednisoLONE acetate (PRED FORTE) 1 % DrpS Place 1 drop into the right eye 3 (three) times daily. 10 mL 3    predniSONE (DELTASONE) 2.5 MG tablet TAKE 5 TABLETS (12.5 MG TOTAL) BY MOUTH ONCE DAILY. 150 tablet 2    predniSONE (DELTASONE) 5 MG tablet TAKE 2 TABLETS BY MOUTH ONCE DAILY. 60 tablet 3    PROAIR HFA 90 mcg/actuation inhaler       tirzepatide 7.5 mg/0.5 mL PnIj Inject 7.5 mg into the skin every 7 days. (Patient taking differently: Inject 7.5 mg into the skin every 7 days. THURSDAYS, LAST DOSE 4/11/24) 4 Pen 11    tiZANidine (ZANAFLEX) 4 MG tablet TAKE 1 TABLET BY MOUTH EVERY 8 HOURS AS NEEDED FOR PAIN 30 tablet 2    tocilizumab 162 mg/0.9 mL PnIj Inject 162 mg into the skin once a week. 4 mL 0    tocilizumab 162 mg/0.9 mL PnIj Inject 162 mg into the skin once a week. 4 mL 6    walker Misc 1 Units by Misc.(Non-Drug; Combo Route) route daily as needed (Jay Beltre). 1 each 0     Scheduled Medications:  Current Facility-Administered Medications   Medication Dose Route Frequency    acetaminophen  1,000 mg Oral Q8H    digoxin  0.125 mg Oral Daily    enoxparin  40 mg Subcutaneous Daily    EScitalopram oxalate  20 mg Oral QHS    gabapentin  400 mg Oral TID    Lactobacillus rhamnosus GG  1 capsule Oral Daily    LIDOcaine  1 patch Transdermal Q24H     polyethylene glycol  17 g Oral BID     PRN:   Current Facility-Administered Medications:     dextrose 10%, 12.5 g, Intravenous, PRN    dextrose 10%, 25 g, Intravenous, PRN    glucagon (human recombinant), 1 mg, Intramuscular, PRN    glucose, 16 g, Oral, PRN    glucose, 24 g, Oral, PRN    insulin aspart U-100, 0-5 Units, Subcutaneous, QID (AC + HS) PRN    naloxone, 0.02 mg, Intravenous, PRN    ondansetron, 4 mg, Intravenous, Q8H PRN    oxyCODONE, 5 mg, Oral, Q4H PRN    oxyCODONE, 10 mg, Oral, Q4H PRN    sodium chloride 0.9%, 10 mL, Intravenous, Q12H PRN  Infusions:   Current Facility-Administered Medications   Medication Dose Route Frequency Last Rate Last Admin     Estimated Creatinine Clearance: 133.2 mL/min (based on SCr of 0.6 mg/dL).             Assessment/Plan:      * Intractable pain  -Due to new L1 fracture   -Hydromorphone 1 mg IV q4h prn for severe pain  -Oxycodone 10 mg q4h prn for moderate pain   -Per spine surgery  MRI L sp 4/27 (partially completed): acute L1 compression fx with mild retropulsion. No severe stenosis. post kyphopasty at T11, L3   -Xrays pending now TSLO brace  -Repeat MRI w/ contrast w/ sedation neg for path fracture;  -chest CT neg for any definitive malignancy ; has lung nodules - pt rec'd outpt f/u.  -will await therapy session w/ PT to see if pt's pain is controlled or needs kyphoplasty.      5/2 awaiting PT session to see if pt can tolerate pain off dilaudid; otherwise  needs kyphoplasty. off  dilaudid and continue oxycodone 5/10mg q 4h PRN.continue tylenol 1000mg q8h, Gabapentin BID and robaxin. needs SNF        Constipation  started on miralax BID      Nephrolithiasis  . Bilateral nonobstructive nephrolithiasis. UA negative       Enteritis  improved as above       Diarrhea of presumed infectious origin  C diff testing negative  Afebrile; no abx needed at this time  Seems resolved now  CT showing   Liquid stool throughout the large bowel and within several small bowel loops,  taken together, suggests diarrheal illness possibly in the setting of enteritis. And Left perinephric fat stranding noting somewhat delayed excretion of contrast by the left kidney.     Imodium prn   probiotic  CRP is downtrending      Lumbar vertebral fracture    5/2  s/p T11 and L3 kyphoplasty with pain management Dr. Aviles on 4/22/2024, now with worsening back pain and diarrhea. Has had multiple ED visits in the past week, CT A/P and MRI show new acute compression fx of L1 with mild retropulsion. In significant pain but remains neuro intact. MRI L sp 4/27 (partially completed): acute L1 compression fx with mild retropulsion. No severe stenosis. S/p kyphopasty at T11, L3. CT chest malignancy workup completed, R upper lobe opacity. Read reports malignancy less likely. s/p NSGY Eval - multimodal pain control with narcotics, tylenol/NSAIDs, muscle relaxants. TLSO brace when upright/OOB. No clear evidence of pathological fracture. consider L1 kyphoplasty for ongoing pain in the future. Patient not interested at this time. needs Optimization of bone quality due to  osteopenia on DXA scan from 2023. awaiting PT session to see if pt can tolerate pain off dilaudid; otherwise  needs kyphoplasty. off  dilaudid and continue oxycodone 5/10mg q 4h PRN.continue tylenol 1000mg q8h, Gabapentin BID and robaxin. needs SNF      Rheumatoid arthritis involving multiple sites with positive rheumatoid factor  Hx of RA on tocilizumab, last in march 2024, supposed to be on lamivudine for hep B ppx    Supraventricular tachycardia  Hx of SVT on digoxin, coreg but hasn't been taking coreg    Continue digoxin for now   Will give IV magnesium 2gm  Optimize potassium > 4    Coronary artery disease  Hx of CAD  Hold ASA for now     Type 2 diabetes mellitus with hyperglycemia, with long-term current use of insulin  Hasn't been taking insulin  Will hold off on scheduled insulin  Monitor glucose AC/HS and utilize prn insulin     Patient's FSGs are  controlled on current hypoglycemics.   Last A1c reviewed-   Lab Results   Component Value Date    HGBA1C 7.4 (H) 03/01/2024    HGBA1C 7.0 (H) 12/01/2023    HGBA1C 7.1 (H) 09/29/2023     Will hold PO hypoglycemics and will start correctional scale insulin  Most recent fingerstick glucose reviewed-   Recent Labs   Lab 05/03/24  1155 05/03/24  1628 05/04/24  0609 05/04/24  0740   POCTGLUCOSE 193* 153* 149* 160*     currently on   Antihyperglycemics (From admission, onward)      Start     Stop Route Frequency Ordered    04/27/24 0133  insulin aspart U-100 pen 0-5 Units         -- SubQ Before meals & nightly PRN 04/27/24 0133             Chronic diastolic heart failure  Hasn't been on lasix   Currently hypovolemic due to diarrhea, s/p 1 L of IVF in the ED  Will encourage PO hydration and utilize IV fluids if needed     Echo    Interpretation Summary    Left Ventricle: The left ventricle is normal in size. Increased wall thickness. There is concentric remodeling. There is normal systolic function with a visually estimated ejection fraction of 55 - 60%. Grade I diastolic dysfunction.    Right Ventricle: Normal right ventricular cavity size. Wall thickness is normal. Right ventricle wall motion  is normal. Systolic function is normal.         Hepatitis B core antibody positive  Patient is supposed to be on ppx with lamivudine but hasn't been taking it for few weeks  Will hold off resuming it for now, needs to be resumed at discharge         VTE Risk Mitigation (From admission, onward)           Ordered     enoxaparin injection 40 mg  Daily         04/27/24 0133     IP VTE HIGH RISK PATIENT  Once         04/27/24 0133     Place sequential compression device  Until discontinued         04/27/24 0133                    Discharge Planning   ALEXANDER: 5/7/2024     Code Status: Full Code   Is the patient medically ready for discharge?: (No Documentation)    Reason for patient still in hospital (select all that apply):  Treatment  Discharge Plan A: Skilled Nursing Facility                  Sung Scherer MD  Department of Hospital Medicine   [facility] - Neurosurgery ([facility])

## 2024-05-04 NOTE — PLAN OF CARE
Problem: Adult Inpatient Plan of Care  Goal: Plan of Care Review  Outcome: Progressing  Goal: Patient-Specific Goal (Individualized)  Description: Pt will maintain sbp below 180  Outcome: Progressing  Goal: Absence of Hospital-Acquired Illness or Injury  Outcome: Progressing  Goal: Optimal Comfort and Wellbeing  Outcome: Progressing  Goal: Readiness for Transition of Care  Outcome: Progressing     Problem: Diabetes Comorbidity  Goal: Blood Glucose Level Within Targeted Range  Outcome: Progressing     Problem: Infection  Goal: Absence of Infection Signs and Symptoms  Outcome: Progressing     Problem: Wound  Goal: Optimal Coping  Outcome: Progressing  Goal: Optimal Functional Ability  Outcome: Progressing  Goal: Absence of Infection Signs and Symptoms  Outcome: Progressing  Goal: Improved Oral Intake  Outcome: Progressing  Goal: Optimal Pain Control and Function  Outcome: Progressing  Goal: Skin Health and Integrity  Outcome: Progressing  Goal: Optimal Wound Healing  Outcome: Progressing     Problem: Fall Injury Risk  Goal: Absence of Fall and Fall-Related Injury  Outcome: Progressing

## 2024-05-04 NOTE — DISCHARGE SUMMARY
[facility] - Neurosurgery ([facility])  [facility] Medicine  Discharge Summary      Patient Name: Nithin Wagner  MRN: [id number]  ALLI: [id number]  Patient Class: IP- Inpatient  Admission Date: 4/26/2024  Hospital Length of Stay: 10 days  Discharge Date and Time:  05/08/2024 4:45 PM  Attending Physician: Sung Scherer MD   Discharging Provider: Sung Scherer MD  Primary Care Provider: Tushar Drew MD  [facility] Medicine Team: [facility] Sung Scherer MD  Primary Care Team: [facility]    HPI:   Pt is a 68yoM s/p s/p T11 and L3 kyphoplasty on 4/22/2024, now with worsening back pain and diarrhea. Has had multiple ED visits in the past week, CT A/P done yesterday showed Interval compression deformity involving the superior endplate of L1 as well as enteritis, he was discharged home on cipro/flagyl but never started taking them. Was seen by pain management and recommended admission for intractable pain.     in the ED, vital signs are stable, cbc/cmp unremarkable, ESR/CRP down trending.  patient has no red flags for infection/spinal involvement beyond pain and has reassuring neurological examination.  Patient has  required 2 mg of IV hydromorphone.      At bedside, complained of low back pain, denies worsening numbness in his legs, no fecal/urinary incontinence, no fevers, no night sweats. Complained of watery diarrhea for few days, he states that few of his friends have contracted a diarrheal illness as well. Diarrhea is associated with abdominal pain, no nausea/no vomiting. Hasn't been able to eat well and hasn't taking most of his medications in the past few days.     Procedure(s) (LRB):  MRI (Magnetic Resonance Imagine) (N/A)      Hospital Course:   Per NSGY L-spine MRI (partially completed) read showed   acute L1 compression fx with mild retropulsion. No severe stenosis. post kyphopasty at T11, L3. C diff was negative, started on imodium. Pt reports improvement in diarrhea; 1 loose nonbloody bm  since 4 am, not as watery anymore. CT chest neg for any definitive malignancy, has nonspecific lung nodules, outpt f/u.     5/2  s/p T11 and L3 kyphoplasty with pain management Dr. Aviles on 4/22/2024, now with worsening back pain and diarrhea. Has had multiple ED visits in the past week, CT A/P and MRI show new acute compression fx of L1 with mild retropulsion. In significant pain but remains neuro intact. MRI L sp 4/27 (partially completed): acute L1 compression fx with mild retropulsion. No severe stenosis. S/p kyphopasty at T11, L3. CT chest malignancy workup completed, R upper lobe opacity. Read reports malignancy less likely. s/p NSGY Eval - multimodal pain control with narcotics, tylenol/NSAIDs, muscle relaxants. TLSO brace when upright/OOB. No clear evidence of pathological fracture. consider L1 kyphoplasty for ongoing pain in the future. Patient not interested at this time. needs Optimization of bone quality due to  osteopenia on DXA scan from 2023. awaiting PT session to see if pt can tolerate pain off dilaudid; otherwise  needs kyphoplasty. off  dilaudid and continue oxycodone 5/10mg q 4h PRN.continue tylenol 1000mg q8h, Gabapentin BID and robaxin. X-Ray lumbar lateral supine and upright to assess stability of fracture in the brace- Compression fractures of T11, L1 and L3, status post vertebroplasty at T11 and L3 .  needs SNF. received 3 doses of IV dilaudid 0.5mg . lidocaine patch  5/3 did not receive IV dilaudid today. B12 1500s and folate levels WNL  5/4 tolerating pain with oral oxycodone 10mg q 4h prn. completed robaxin, c/o pain in the left axilla - no skin changes.   5/5 DVT sono left UE - No thrombus in central veins of the left upper extremity.  5/6 c/o left sided testicular pain 4/10. no dysuria. 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 Repeat washout performed 11/17/22. US scrotum and testes -No definite acute testicular  findings.  No evidence of torsion at the present time. Small simple appearing right-sided hydrocele. Asymmetrically increased caliber of the left epididymis with relative hypodensity and question of surrounding increased vascularity.  Correlation for signs/symptoms of epididymitis   5/7 improved scrotal pain.   5/8 Discharge to SNF today       Goals of Care Treatment Preferences:  Code Status: Full Code      Consults:   Consults (From admission, onward)          Status Ordering Provider     Inpatient consult to Midline team  Once        Provider:  (Not yet assigned)    Completed DAWOOD JARAMILLO     Inpatient consult to Neurosurgery  Once        Provider:  (Not yet assigned)    Completed SANDY LYN                  Assessment/Plan:      * Intractable pain  -Due to new L1 fracture   -Hydromorphone 1 mg IV q4h prn for severe pain  -Oxycodone 10 mg q4h prn for moderate pain   -Per spine surgery  MRI L sp 4/27 (partially completed): acute L1 compression fx with mild retropulsion. No severe stenosis. post kyphopasty at T11, L3   -Xrays pending now TSLO brace  -Repeat MRI w/ contrast w/ sedation neg for path fracture;  -chest CT neg for any definitive malignancy ; has lung nodules - pt rec'd outpt f/u.  -will await therapy session w/ PT to see if pt's pain is controlled or needs kyphoplasty.        5/2 awaiting PT session to see if pt can tolerate pain off dilaudid; otherwise  needs kyphoplasty. off  dilaudid and continue oxycodone 5/10mg q 4h PRN.continue tylenol 1000mg q8h, Gabapentin BID and robaxin. needs SNF           Constipation  started on miralax BID        Nephrolithiasis  . Bilateral nonobstructive nephrolithiasis. UA negative         Enteritis  improved as above         Diarrhea of presumed infectious origin  C diff testing negative  Afebrile; no abx needed at this time  Seems resolved now  CT showing   Liquid stool throughout the large bowel and within several small bowel loops, taken together,  suggests diarrheal illness possibly in the setting of enteritis. And Left perinephric fat stranding noting somewhat delayed excretion of contrast by the left kidney.     Imodium prn   probiotic  CRP is downtrending        Lumbar vertebral fracture     5/2  s/p T11 and L3 kyphoplasty with pain management Dr. Aviles on 4/22/2024, now with worsening back pain and diarrhea. Has had multiple ED visits in the past week, CT A/P and MRI show new acute compression fx of L1 with mild retropulsion. In significant pain but remains neuro intact. MRI L sp 4/27 (partially completed): acute L1 compression fx with mild retropulsion. No severe stenosis. S/p kyphopasty at T11, L3. CT chest malignancy workup completed, R upper lobe opacity. Read reports malignancy less likely. s/p NSGY Eval - multimodal pain control with narcotics, tylenol/NSAIDs, muscle relaxants. TLSO brace when upright/OOB. No clear evidence of pathological fracture. consider L1 kyphoplasty for ongoing pain in the future. Patient not interested at this time. needs Optimization of bone quality due to  osteopenia on DXA scan from 2023. awaiting PT session to see if pt can tolerate pain off dilaudid; otherwise  needs kyphoplasty. off  dilaudid and continue oxycodone 5/10mg q 4h PRN.continue tylenol 1000mg q8h, Gabapentin BID and robaxin. needs SNF        Rheumatoid arthritis involving multiple sites with positive rheumatoid factor  Hx of RA on tocilizumab, last in march 2024, supposed to be on lamivudine for hep B ppx     Supraventricular tachycardia  Hx of SVT on digoxin, coreg but hasn't been taking coreg     Continue digoxin for now   Will give IV magnesium 2gm  Optimize potassium > 4     Coronary artery disease  Hx of CAD  Hold ASA for now      Type 2 diabetes mellitus with hyperglycemia, with long-term current use of insulin  Hasn't been taking insulin  Will hold off on scheduled insulin  Monitor glucose AC/HS and utilize prn insulin      Patient's FSGs are  controlled on current hypoglycemics.   Last A1c reviewed-         Lab Results   Component Value Date     HGBA1C 7.4 (H) 03/01/2024     HGBA1C 7.0 (H) 12/01/2023     HGBA1C 7.1 (H) 09/29/2023      Will hold PO hypoglycemics and will start correctional scale insulin  Most recent fingerstick glucose reviewed-          Recent Labs   Lab 05/03/24  1155 05/03/24  1628 05/04/24  0609 05/04/24  0740   POCTGLUCOSE 193* 153* 149* 160*      currently on   Antihyperglycemics (From admission, onward)        Start     Stop Route Frequency Ordered     04/27/24 0133   insulin aspart U-100 pen 0-5 Units         -- SubQ Before meals & nightly PRN 04/27/24 0133                Chronic diastolic heart failure  Hasn't been on lasix   Currently hypovolemic due to diarrhea, s/p 1 L of IVF in the ED  Will encourage PO hydration and utilize IV fluids if needed      Echo     Interpretation Summary    Left Ventricle: The left ventricle is normal in size. Increased wall thickness. There is concentric remodeling. There is normal systolic function with a visually estimated ejection fraction of 55 - 60%. Grade I diastolic dysfunction.    Right Ventricle: Normal right ventricular cavity size. Wall thickness is normal. Right ventricle wall motion  is normal. Systolic function is normal.           Hepatitis B core antibody positive  Patient is supposed to be on ppx with lamivudine but hasn't been taking it for few weeks  Will hold off resuming it for now, needs to be resumed at discharge            Final Active Diagnoses:    Diagnosis Date Noted POA    PRINCIPAL PROBLEM:  Intractable pain [R52] 04/26/2024 Yes    Testicular pain, left [N50.812] 05/06/2024 Yes    Depression [F32.A] 05/03/2024 Yes    Constipation [K59.00] 05/02/2024 Yes    Enteritis [K52.9] 04/30/2024 Yes    Nephrolithiasis [N20.0] 04/30/2024 Yes    Lumbar vertebral fracture [S32.009A] 04/26/2024 Yes    Diarrhea of presumed infectious origin [R19.7] 04/26/2024 Yes    Hyperlipidemia  [E78.5]  Yes    Anxiety [F41.9] 01/28/2023 Yes    Rheumatoid arthritis involving multiple sites with positive rheumatoid factor [M05.79] 01/27/2023 Yes    Hyponatremia [E87.1] 12/01/2022 Yes    Supraventricular tachycardia [I47.10] 11/25/2022 Yes    Coronary artery disease [I25.10] 03/23/2021 Yes    Tobacco use disorder [F17.200] 03/10/2021 Yes    Type 2 diabetes mellitus with hyperglycemia, with long-term current use of insulin [E11.65, Z79.4] 11/12/2020 Not Applicable    Chronic diastolic heart failure [I50.32] 11/12/2020 Yes    Essential hypertension [I10] 11/12/2020 Yes    Hepatitis B core antibody positive [R76.8] 04/24/2020 Yes      Problems Resolved During this Admission:    Diagnosis Date Noted Date Resolved POA    Fracture of first thoracic vertebra [S22.019A] 04/30/2024 05/02/2024 Yes       Discharged Condition: fair    Disposition: SNF    Follow Up:    Patient Instructions:      X-Ray Lumbar Spine Lateral Supine and Lateral Upright   Standing Status: Future Standing Exp. Date: 05/03/25     Order Specific Question Answer Comments   May the Radiologist modify the order per protocol to meet the clinical needs of the patient? Yes      Ambulatory referral/consult to Neurosurgery   Standing Status: Future   Referral Priority: Routine Referral Type: Consultation   Referral Reason: Specialty Services Required   Requested Specialty: Neurosurgery   Number of Visits Requested: 1     Ambulatory referral/consult to Neurosurgery   Standing Status: Future   Referral Priority: Routine Referral Type: Consultation   Referral Reason: Specialty Services Required   Requested Specialty: Neurosurgery   Number of Visits Requested: 1     Ambulatory referral/consult to Pain Clinic   Standing Status: Future   Referral Priority: Routine Referral Type: Consultation   Referral Reason: Specialty Services Required   Requested Specialty: Pain Medicine   Number of Visits Requested: 1       Significant Diagnostic Studies: Labs: BMP:  "  Recent Labs   Lab 05/06/24  1222 05/07/24  0423 05/08/24  0540   * 144* 134*   * 134* 133*   K 4.0 4.0 3.9    99 98   CO2 28 26 28   BUN 13 10 9   CREATININE 0.7 0.7 0.7   CALCIUM 7.8* 7.9* 8.2*   MG 1.5* 1.9 1.6   , CMP   Recent Labs   Lab 05/06/24  1222 05/07/24  0423 05/08/24  0540   * 134* 133*   K 4.0 4.0 3.9    99 98   CO2 28 26 28   * 144* 134*   BUN 13 10 9   CREATININE 0.7 0.7 0.7   CALCIUM 7.8* 7.9* 8.2*   PROT 4.6* 4.9* 5.0*   ALBUMIN 1.7* 1.8* 1.9*   BILITOT 0.2 0.2 0.2   ALKPHOS 129 136* 137*   AST 17 20 36   ALT 10 11 14   ANIONGAP 7* 9 7*   , CBC   Recent Labs   Lab 05/06/24 1222 05/07/24  0423   WBC 7.57 7.17   HGB 13.9* 14.5   HCT 42.0 43.6    255   , INR   Lab Results   Component Value Date    INR 1.0 10/26/2022    INR 1.0 08/16/2022    INR 0.9 03/10/2021   , Lipid Panel   Lab Results   Component Value Date    CHOL 221 (H) 12/01/2023    HDL 61 12/01/2023    LDLCALC 128.6 12/01/2023    TRIG 157 (H) 12/01/2023    CHOLHDL 27.6 12/01/2023   , Troponin No results for input(s): "TROPONINI" in the last 168 hours., A1C:   Recent Labs   Lab 12/01/23  0805 03/01/24  1335   HGBA1C 7.0* 7.4*   , and All labs within the past 24 hours have been reviewed  Microbiology: Blood Culture   Lab Results   Component Value Date    LABBLOO No growth after 5 days. 04/26/2024    LABBLOO No growth after 5 days. 04/26/2024   , Sputum Culture No results found for: "GSRESP", "RESPIRATORYC", and Urine Culture    Lab Results   Component Value Date    LABURIN No growth 11/23/2022       US SCROTUM AND TESTICLES WITH DOPPLER (XPD)  Narrative: EXAMINATION:  US SCROTUM AND TESTICLES WITH DOPPLER (XPD)    CLINICAL HISTORY:  left testicle pain;    TECHNIQUE:  Sonography of the scrotum and testes.    COMPARISON:  None.    FINDINGS:  Right Testicle:    *Size: 4.9 x 2.1 x 3.4 cm  *Appearance: Normal.  *Flow: Normal arterial and venous flow  *Epididymis: Normal.  *Hydrocele: Small simple " appearing hydrocele.  *Varicocele: None.  .    Left Testicle:    *Size: 5 x 2.3 x 2.5 cm  *Appearance: Normal.  *Flow: Normal arterial and venous flow  *Epididymis: Asymmetric thickening and relative echogenicity of the epididymis.  Question of increased vascularity peripherally.  Anechoic, cystic structure at the epididymis measures 7 x 5 x 4 mm in keeping with cysts versus spermatocele.  Additionally noted is an adjoining echogenic focus with the epididymis measuring on the order of 2 x 2 x 2 mm, possibly nonspecific calcification.  *Hydrocele: None.  *Varicocele: None.  .    Other findings: None.  Impression: 1. No definite acute testicular findings.  No evidence of torsion at the present time.  2. Small simple appearing right-sided hydrocele.  3. Asymmetrically increased caliber of the left epididymis with relative hypodensity and question of surrounding increased vascularity.  Correlation for signs/symptoms of epididymitis would be recommended.  4. Additional details, as provided in the body of report.    Electronically signed by: Mayco Peng  Date:    05/06/2024  Time:    09:50     Pending Diagnostic Studies:       None           Medications:  Reconciled Home Medications:      Medication List        Start taking these medications      acetaminophen 500 MG tablet  Commonly known as: TYLENOL  Take 2 tablets (1,000 mg total) by mouth every 8 (eight) hours. for 10 days     aspirin 81 MG EC tablet  Commonly known as: ECOTRIN  Take 1 tablet (81 mg total) by mouth once daily.  Replaces: aspirin 81 MG Chew     doxycycline 100 MG tablet  Commonly known as: VIBRA-TABS  Take 1 tablet (100 mg total) by mouth every 12 (twelve) hours. for 10 days     Lactobacillus rhamnosus GG 10 billion cell capsule  Commonly known as: CULTURELLE  Take 1 capsule by mouth once daily.     LIDOcaine 5 %  Commonly known as: LIDODERM  Place 1 patch onto the skin once daily. Remove & Discard patch within 12 hours or as directed by MD    "  oxyCODONE 10 mg Tab immediate release tablet  Commonly known as: ROXICODONE  Take 1 tablet (10 mg total) by mouth every 4 (four) hours as needed.     polyethylene glycol 17 gram/dose powder  Commonly known as: GLYCOLAX  Use cap to measure 17 grams, mix in liquid and take by mouth once daily.            Change how you take these medications      gabapentin 400 MG capsule  Commonly known as: NEURONTIN  Take 1 capsule (400 mg total) by mouth 3 (three) times daily.  What changed:   medication strength  how much to take  how to take this  when to take this     MOUNJARO 7.5 mg/0.5 mL Pnij  Generic drug: tirzepatide  Inject 7.5 mg into the skin every 7 days.  What changed: additional instructions     tocilizumab 162 mg/0.9 mL Pnij  Inject 162 mg into the skin once a week.  What changed: Another medication with the same name was removed. Continue taking this medication, and follow the directions you see here.            Continue taking these medications      BD ULTRA-FINE RAHUL PEN NEEDLE 32 gauge x 5/32" Ndle  Generic drug: pen needle, diabetic  To use 6 daily     digoxin 125 mcg tablet  Commonly known as: LANOXIN  TAKE 1 TABLET BY MOUTH ONCE DAILY.     ergocalciferol 50,000 unit Cap  Commonly known as: ERGOCALCIFEROL  Take 1 capsule (50,000 Units total) by mouth every 7 days.     EScitalopram oxalate 20 MG tablet  Commonly known as: LEXAPRO  Take 20 mg by mouth every evening.     folic acid 1 MG tablet  Commonly known as: FOLVITE  Take 1 tablet (1 mg total) by mouth once daily.     ketoconazole 2 % cream  Commonly known as: NIZORAL  Apply to affected area DAILY     lamiVUDine 150 MG Tab  Commonly known as: EPIVIR  TAKE 1 TABLET BY MOUTH EVERY DAY     multivitamin per tablet  Commonly known as: THERAGRAN  Take 1 tablet by mouth once daily.     nitroGLYCERIN 0.4 MG SL tablet  Commonly known as: NITROSTAT  PLACE 1 TABLET UNDER THE TONGUE EVERY 5 MINUTES AS NEEDED FOR CHEST PAIN.     nystatin powder  Commonly known as: " MYCOSTATIN  Apply topically 4 (four) times daily. Apply to intertriginous areas as directed up to four times daily to reduce moisture. for 14 days     PROAIR HFA 90 mcg/actuation inhaler  Generic drug: albuterol     walker Misc  1 Units by Misc.(Non-Drug; Combo Route) route daily as needed (Rollator Walker).            Stop taking these medications      aspirin 81 MG Chew  Replaced by: aspirin 81 MG EC tablet     carvediloL 6.25 MG tablet  Commonly known as: COREG     ciprofloxacin HCl 500 MG tablet  Commonly known as: CIPRO     diltiaZEM 120 MG Cp24  Commonly known as: CARDIZEM CD     FIASP PENFILL U-100 INSULIN 100 unit/mL (3 mL) Crtg pen  Generic drug: insulin aspart (niacinamide)     furosemide 80 MG tablet  Commonly known as: LASIX     HYDROmorphone 4 MG tablet  Commonly known as: DILAUDID     metoprolol tartrate 50 MG tablet  Commonly known as: LOPRESSOR     metroNIDAZOLE 500 MG tablet  Commonly known as: FLAGYL     moxifloxacin 0.5 % ophthalmic solution  Commonly known as: VIGAMOX     mupirocin 2 % ointment  Commonly known as: BACTROBAN     olmesartan 40 MG tablet  Commonly known as: BENICAR     oxyCODONE-acetaminophen  mg per tablet  Commonly known as: PERCOCET     oxyCODONE-acetaminophen 5-325 mg per tablet  Commonly known as: PERCOCET     prednisoLONE acetate 1 % Drps  Commonly known as: PRED FORTE     predniSONE 2.5 MG tablet  Commonly known as: DELTASONE     predniSONE 5 MG tablet  Commonly known as: DELTASONE     tiZANidine 4 MG tablet  Commonly known as: ZANAFLEX     TRESIBA FLEXTOUCH U-200 200 unit/mL (3 mL) insulin pen  Generic drug: insulin degludec              Indwelling Lines/Drains at time of discharge:   Lines/Drains/Airways       None               45 minutes of time spent on discharge, including examining the patient, providing discharge instructions, arranging   follow up and documentation        Sung Scherer MD  Attending Staff Physician  [facility] Medicine  pager-  [phone]  [facility] [id number]

## 2024-05-05 LAB
POCT GLUCOSE: 162 MG/DL (ref 70–110)
POCT GLUCOSE: 176 MG/DL (ref 70–110)
POCT GLUCOSE: 185 MG/DL (ref 70–110)

## 2024-05-05 PROCEDURE — 11000001 HC ACUTE MED/SURG PRIVATE ROOM

## 2024-05-05 PROCEDURE — 25000003 PHARM REV CODE 250

## 2024-05-05 PROCEDURE — 25000003 PHARM REV CODE 250: Performed by: HOSPITALIST

## 2024-05-05 PROCEDURE — 63600175 PHARM REV CODE 636 W HCPCS: Performed by: INTERNAL MEDICINE

## 2024-05-05 PROCEDURE — 25000003 PHARM REV CODE 250: Performed by: INTERNAL MEDICINE

## 2024-05-05 RX ADMIN — OXYCODONE HYDROCHLORIDE 10 MG: 10 TABLET ORAL at 03:05

## 2024-05-05 RX ADMIN — OXYCODONE HYDROCHLORIDE 10 MG: 10 TABLET ORAL at 10:05

## 2024-05-05 RX ADMIN — OXYCODONE HYDROCHLORIDE 10 MG: 10 TABLET ORAL at 06:05

## 2024-05-05 RX ADMIN — ENOXAPARIN SODIUM 40 MG: 40 INJECTION SUBCUTANEOUS at 05:05

## 2024-05-05 RX ADMIN — ONDANSETRON 4 MG: 2 INJECTION INTRAMUSCULAR; INTRAVENOUS at 02:05

## 2024-05-05 RX ADMIN — ACETAMINOPHEN 1000 MG: 500 TABLET ORAL at 02:05

## 2024-05-05 RX ADMIN — ESCITALOPRAM OXALATE 20 MG: 20 TABLET, FILM COATED ORAL at 09:05

## 2024-05-05 RX ADMIN — OXYCODONE HYDROCHLORIDE 10 MG: 10 TABLET ORAL at 02:05

## 2024-05-05 RX ADMIN — OXYCODONE HYDROCHLORIDE 10 MG: 10 TABLET ORAL at 11:05

## 2024-05-05 RX ADMIN — ACETAMINOPHEN 1000 MG: 500 TABLET ORAL at 06:05

## 2024-05-05 RX ADMIN — OXYCODONE HYDROCHLORIDE 10 MG: 10 TABLET ORAL at 07:05

## 2024-05-05 RX ADMIN — GABAPENTIN 400 MG: 400 CAPSULE ORAL at 02:05

## 2024-05-05 RX ADMIN — Medication 1 CAPSULE: at 09:05

## 2024-05-05 RX ADMIN — GABAPENTIN 400 MG: 400 CAPSULE ORAL at 09:05

## 2024-05-05 RX ADMIN — ACETAMINOPHEN 1000 MG: 500 TABLET ORAL at 09:05

## 2024-05-05 RX ADMIN — DIGOXIN 0.12 MG: 125 TABLET ORAL at 09:05

## 2024-05-05 NOTE — NURSING
.Nurses Note -- 4 Eyes      5/5/2024   12:03 AM      Skin assessed during: Q Shift Change      [x] No Altered Skin Integrity Present    [x]Prevention Measures Documented      [] Yes- Altered Skin Integrity Present or Discovered   [] LDA Added if Not in Epic (Describe Wound)   [] New Altered Skin Integrity was Present on Admit and Documented in LDA   [] Wound Image Taken    Wound Care Consulted? No    Attending Nurse:  Janel Owens RN/Staff Member:Tiffany

## 2024-05-05 NOTE — PLAN OF CARE
Problem: Adult Inpatient Plan of Care  Goal: Plan of Care Review  Outcome: Progressing  Goal: Patient-Specific Goal (Individualized)  Description: Pt will maintain sbp below 180  Outcome: Progressing  Goal: Absence of Hospital-Acquired Illness or Injury  Outcome: Progressing  Goal: Optimal Comfort and Wellbeing  Outcome: Progressing  Goal: Readiness for Transition of Care  Outcome: Progressing     Problem: Diabetes Comorbidity  Goal: Blood Glucose Level Within Targeted Range  Outcome: Progressing     Problem: Infection  Goal: Absence of Infection Signs and Symptoms  Outcome: Progressing  POC and meds reviewed with patient.All needs addressed.

## 2024-05-05 NOTE — PROGRESS NOTES
[facility] - Neurosurgery ([facility])  [facility] Medicine  Progress Note    Patient Name: Nithin Wagner  MRN: [id number]  Patient Class: IP- Inpatient   Admission Date: 4/26/2024  Length of Stay: 7 days  Attending Physician: Sung Scherer MD  Primary Care Provider: Tushar Drew MD        Subjective:     Principal Problem:Intractable pain        HPI:  Pt is a 68yoM s/p s/p T11 and L3 kyphoplasty on 4/22/2024, now with worsening back pain and diarrhea. Has had multiple ED visits in the past week, CT A/P done yesterday showed Interval compression deformity involving the superior endplate of L1 as well as enteritis, he was discharged home on cipro/flagyl but never started taking them. Was seen by pain management and recommended admission for intractable pain.     in the ED, vital signs are stable, cbc/cmp unremarkable, ESR/CRP down trending.  patient has no red flags for infection/spinal involvement beyond pain and has reassuring neurological examination.  Patient has  required 2 mg of IV hydromorphone.      At bedside, complained of low back pain, denies worsening numbness in his legs, no fecal/urinary incontinence, no fevers, no night sweats. Complained of watery diarrhea for few days, he states that few of his friends have contracted a diarrheal illness as well. Diarrhea is associated with abdominal pain, no nausea/no vomiting. Hasn't been able to eat well and hasn't taking most of his medications in the past few days.     Overview/Hospital Course:  Per NSGY L-spine MRI (partially completed) read showed   acute L1 compression fx with mild retropulsion. No severe stenosis. post kyphopasty at T11, L3. C diff was negative, started on imodium. Pt reports improvement in diarrhea; 1 loose nonbloody bm since 4 am, not as watery anymore. CT chest neg for any definitive malignancy, has nonspecific lung nodules, outpt f/u.     5/2  s/p T11 and L3 kyphoplasty with pain management Dr. Aviles on 4/22/2024, now with worsening  back pain and diarrhea. Has had multiple ED visits in the past week, CT A/P and MRI show new acute compression fx of L1 with mild retropulsion. In significant pain but remains neuro intact. MRI L sp 4/27 (partially completed): acute L1 compression fx with mild retropulsion. No severe stenosis. S/p kyphopasty at T11, L3. CT chest malignancy workup completed, R upper lobe opacity. Read reports malignancy less likely. s/p NSGY Eval - multimodal pain control with narcotics, tylenol/NSAIDs, muscle relaxants. TLSO brace when upright/OOB. No clear evidence of pathological fracture. consider L1 kyphoplasty for ongoing pain in the future. Patient not interested at this time. needs Optimization of bone quality due to  osteopenia on DXA scan from 2023. awaiting PT session to see if pt can tolerate pain off dilaudid; otherwise  needs kyphoplasty. off  dilaudid and continue oxycodone 5/10mg q 4h PRN.continue tylenol 1000mg q8h, Gabapentin BID and robaxin. X-Ray lumbar lateral supine and upright to assess stability of fracture in the brace- Compression fractures of T11, L1 and L3, status post vertebroplasty at T11 and L3 .  needs SNF. received 3 doses of IV dilaudid 0.5mg . lidocaine patch  5/3 did not receive IV dilaudid today. B12 1500s and folate levels WNL  5/4 tolerating pain with oral oxycodone 10mg q 4h prn. completed robaxin, c/o pain in the left axilla - no skin changes.   5/5 DVT sono left UE - No thrombus in central veins of the left upper extremity.        Review of Systems:   Pain scale:   Constitutional:  fever,  chills, headache, vision loss, hearing loss, weight loss, Generalized weakness, falls, loss of smell, loss of taste, poor appetite,  sore throat  Respiratory: cough, shortness of breath.   Cardiovascular: chest pain, dizziness, palpitations, orthopnea, swelling of feet, syncope  Gastrointestinal: nausea, vomiting, abdominal pain, diarrhea, black stool,  blood in stool, change in bowel habits,  constipation  Genitourinary: hematuria, dysuria, urgency, frequency  Integument/Breast: rash,  pruritis  Hematologic/Lymphatic: easy bruising, lymphadenopathy  Musculoskeletal: arthralgias , myalgias,  back pain -improved , neck pain, knee pain  Neurological: confusion, seizures, tremors, slurred speech  Behavioral/Psych:  depression, anxiety, auditory or visual hallucinations     OBJECTIVE:     Physical Exam:  Body mass index is 28.47 kg/m².    Constitutional: Appears well-developed and well-nourished.   Head: Normocephalic and atraumatic.   Neck: Normal range of motion. Neck supple.   Cardiovascular: Normal heart rate.  Regular heart rhythm.  Pulmonary/Chest: Effort normal.   Abdominal: No distension.  No tenderness  Musculoskeletal: Normal range of motion. + edema. lumbar tendenesss   Neurological: Alert and oriented to person, place, and time. able to move bilateral upper and lower extremities without limitation   Skin: Skin is warm and dry.   Psychiatric: Normal mood and affect. Behavior is normal.                  Vital Signs  Temp: 98.2 °F (36.8 °C) (05/05/24 1240)  Pulse: 107 (05/05/24 1240)  Resp: 16 (05/05/24 1240)  BP: 118/67 (05/05/24 0842)  SpO2: (Abnormal) 92 % (05/05/24 1240)     24 Hour VS Range    Temp:  [97.9 °F (36.6 °C)-99.2 °F (37.3 °C)]   Pulse:  [100-123]   Resp:  [16-20]   BP: (118-148)/(67-91)   SpO2:  [92 %-94 %]     Intake/Output Summary (Last 24 hours) at 5/5/2024 1248  Last data filed at 5/4/2024 1839  Gross per 24 hour   Intake no documentation   Output 1400 ml   Net -1400 ml         I/O This Shift:  No intake/output data recorded.    Wt Readings from Last 3 Encounters:   04/30/24 92.6 kg (204 lb 2.3 oz)   04/26/24 91.3 kg (201 lb 4.5 oz)   04/25/24 99.8 kg (220 lb)       I have personally reviewed the vitals and recorded Intake/Output     Laboratory/Diagnostic Data:    CBC/Anemia Labs: Coags:    Recent Labs   Lab 04/29/24  0242 05/03/24  0404 05/03/24  1034   WBC 9.45 6.71  --    HGB  "15.4 15.0  --    HCT 45.4 46.9  --     242  --    * 106*  --    RDW 12.1 12.2  --    FOLATE  --   --  12.4   [id number]  --   --  1517*    No results for input(s): "PT", "INR", "APTT" in the last 168 hours.     Chemistries: ABG:   Recent Labs   Lab 04/29/24  0242 05/03/24  0404    135*   K 4.2 4.2    102   CO2 19* 23   BUN 7* 7*   CREATININE 0.7 0.6   CALCIUM 8.6* 8.2*   PROT 5.4* 5.4*   BILITOT 0.4 0.3   ALKPHOS 109 108   ALT 8* 10   AST 11 18   MG 1.6 1.7    No results for input(s): "PH", "PCO2", "PO2", "HCO3", "POCSATURATED", "BE" in the last 168 hours.     POCT Glucose: HbA1c:    Recent Labs   Lab 05/04/24  0609 05/04/24  0740 05/04/24  1054 05/04/24  1540 05/04/24  2044 05/05/24  0845   POCTGLUCOSE 149* 160* 209* 159* 192* 176*    Hemoglobin A1C   Date Value Ref Range Status   03/01/2024 7.4 (H) 4.0 - 5.6 % Final     Comment:     ADA Screening Guidelines:  5.7-6.4%  Consistent with prediabetes  >or=6.5%  Consistent with diabetes    High levels of fetal hemoglobin interfere with the HbA1C  assay. Heterozygous hemoglobin variants (HbS, HgC, etc)do  not significantly interfere with this assay.   However, presence of multiple variants may affect accuracy.     12/01/2023 7.0 (H) 4.0 - 5.6 % Final     Comment:     ADA Screening Guidelines:  5.7-6.4%  Consistent with prediabetes  >or=6.5%  Consistent with diabetes    High levels of fetal hemoglobin interfere with the HbA1C  assay. Heterozygous hemoglobin variants (HbS, HgC, etc)do  not significantly interfere with this assay.   However, presence of multiple variants may affect accuracy.     09/29/2023 7.1 (H) 4.0 - 5.6 % Final     Comment:     ADA Screening Guidelines:  5.7-6.4%  Consistent with prediabetes  >or=6.5%  Consistent with diabetes    High levels of fetal hemoglobin interfere with the HbA1C  assay. Heterozygous hemoglobin variants (HbS, HgC, etc)do  not significantly interfere with this assay.   However, presence of multiple " "variants may affect accuracy.          Cardiac Enzymes: Ejection Fractions:    No results for input(s): "CPK", "CPKMB", "MB", "TROPONINI" in the last 72 hours. EF   Date Value Ref Range Status   10/28/2022 55 % Final   12/22/2021 57 % Final          No results for input(s): "COLORU", "APPEARANCEUA", "PHUR", "SPECGRAV", "PROTEINUA", "GLUCUA", "KETONESU", "BILIRUBINUA", "OCCULTUA", "NITRITE", "UROBILINOGEN", "LEUKOCYTESUR", "RBCUA", "WBCUA", "BACTERIA", "SQUAMEPITHEL", "HYALINECASTS" in the last 48 hours.    Invalid input(s): "WRIGHTSUR"    Procalcitonin (ng/mL)   Date Value   01/06/2023 0.03   11/24/2022 0.19   11/15/2022 48.84 (H)     Lactate (Lactic Acid) (mmol/L)   Date Value   11/15/2022 2.2   11/15/2022 4.4 (HH)   10/26/2022 1.6   10/20/2022 1.9     BNP (pg/mL)   Date Value   04/26/2024 157 (H)   04/03/2024 177 (H)   02/09/2024 176 (H)   12/09/2022 83   11/15/2022 424 (H)     CRP (mg/L)   Date Value   04/26/2024 29.4 (H)   04/24/2024 63.0 (H)   01/06/2023 19.7 (H)   10/21/2022 8.8 (H)   10/21/2022 8.8 (H)   07/21/2022 0.4   04/22/2022 0.2   07/21/2021 0.8   11/24/2020 0.4   09/22/2020 0.6     Sed Rate (mm/Hr)   Date Value   04/26/2024 41 (H)   01/06/2023 78 (H)   10/21/2022 3   10/21/2022 3   07/21/2022 2   04/22/2022 2   07/21/2021 1   11/24/2020 2   09/22/2020 1   08/14/2020 2     D-Dimer (mg/L FEU)   Date Value   10/26/2022 0.40   11/10/2020 0.50 (H)   09/22/2020 0.45     Ferritin (ng/mL)   Date Value   05/05/2020 134   04/07/2020 163     LD (U/L)   Date Value   11/04/2021 260     Troponin I (ng/mL)   Date Value   04/26/2024 <0.006   02/09/2024 0.020   11/16/2022 0.357 (H)   11/15/2022 0.551 (H)   11/15/2022 0.455 (H)   10/26/2022 0.035 (H)   10/20/2022 0.016   10/20/2022 0.013     CPK (U/L)   Date Value   05/30/2022 68   03/10/2021 89   11/10/2020 133   12/22/2009 579 (H)     Results for orders placed or performed in visit on 09/29/23   Vitamin D   Result Value Ref Range    Vit D, 25-Hydroxy 30 30 - 96 ng/mL "   Results for orders placed or performed in visit on 10/21/22   Vitamin D   Result Value Ref Range    Vit D, 25-Hydroxy 20 (L) 30 - 96 ng/mL     *Note: Due to a large number of results and/or encounters for the requested time period, some results have not been displayed. A complete set of results can be found in Results Review.     SARS-CoV2 (COVID-19) Qualitative PCR (no units)   Date Value   01/17/2023 Not Detected   01/13/2023 Not Detected   01/10/2023 Not Detected   01/06/2023 Not Detected   01/03/2023 Not Detected   12/29/2022 Not Detected   12/21/2022 Not Detected   12/12/2022 Not Detected   08/25/2021 Not Detected   03/07/2021 Not Detected     SARS-CoV-2 RNA, Amplification, Qual (no units)   Date Value   01/27/2023 Negative   10/20/2022 Negative     POC Rapid COVID (no units)   Date Value   02/09/2024 Negative   09/21/2023 Negative   10/26/2022 Negative   08/20/2021 Negative   08/18/2021 Negative       Microbiology labs for the last week  Microbiology Results (last 7 days)       Procedure Component Value Units Date/Time    Blood Culture #2 **CANNOT BE ORDERED STAT** [[id number]] Collected: 04/26/24 1712    Order Status: Completed Specimen: Blood from Peripheral, Antecubital, Right Updated: 05/01/24 1812     Blood Culture, Routine No growth after 5 days.    Blood Culture #1 **CANNOT BE ORDERED STAT** [[id number]] Collected: 04/26/24 1712    Order Status: Completed Specimen: Blood from Peripheral, Forearm, Left Updated: 05/01/24 1812     Blood Culture, Routine No growth after 5 days.            Reviewed and noted in plan where applicable- Please see chart for full lab data.    Lines/Drains:       Peripheral IV - Single Lumen 04/28/24 1026 22 G Posterior;Proximal;Right Forearm (Active)   Site Assessment Clean;Dry;Intact;No redness;No swelling 05/02/24 0400   Extremity Assessment Distal to IV No abnormal discoloration;No redness;No swelling;No warmth 05/02/24 0400   Line Status Flushed 05/02/24 0400   Dressing  Status Clean;Dry;Intact 05/02/24 0400   Dressing Intervention Integrity maintained 05/02/24 0400   Dressing Change Due 05/02/24 05/02/24 0400   Site Change Due 05/02/24 05/02/24 0400   Reason Not Rotated Not due 05/02/24 0400   Number of days: 3       Imaging      Results for orders placed during the hospital encounter of 04/03/24    Echo    Interpretation Summary    Left Ventricle: The left ventricle is normal in size. Increased wall thickness. There is concentric remodeling. There is normal systolic function with a visually estimated ejection fraction of 55 - 60%. Grade I diastolic dysfunction.    Right Ventricle: Normal right ventricular cavity size. Wall thickness is normal. Right ventricle wall motion  is normal. Systolic function is normal.      US Upper Extremity Veins Left  Narrative: EXAMINATION:  US UPPER EXTREMITY VEINS LEFT    CLINICAL HISTORY:  pain;    TECHNIQUE:  Duplex and color flow Doppler evaluation and dynamic compression was performed of the left upper extremity veins.    COMPARISON:  None    FINDINGS:  Central veins: The internal jugular, subclavian, and axillary veins are patent and free of thrombus.    Arm veins: The brachial, basilic, and cephalic veins are patent and compressible.    Contralateral subclavian/internal jugular veins: The right subclavian and internal jugular veins are patent and free of thrombus.    Other findings: None.  Impression: No thrombus in central veins of the left upper extremity.    Electronically signed by resident: Anyi De La Fuente  Date:    05/04/2024  Time:    13:51    Electronically signed by: Leeroy Bruce MD  Date:    05/04/2024  Time:    14:04      Labs, Imaging, EKG and Diagnostic results from 5/5/2024 were reviewed.    Medications:  Medication list was reviewed and changes noted under Assessment/Plan.  No current facility-administered medications on file prior to encounter.     Current Outpatient Medications on File Prior to Encounter   Medication Sig  "Dispense Refill    aspirin 81 MG Chew Take 81 mg by mouth.      BD ULTRA-FINE RAHUL PEN NEEDLE 32 gauge x 5/32" Ndle To use 6 daily 400 each 3    carvediloL (COREG) 6.25 MG tablet Take 1 tablet (6.25 mg total) by mouth 2 (two) times daily with meals. 180 tablet 3    digoxin (LANOXIN) 125 mcg tablet TAKE 1 TABLET BY MOUTH ONCE DAILY. 90 tablet 3    diltiaZEM (CARDIZEM CD) 120 MG Cp24 Take 120 mg by mouth Daily.      ergocalciferol (ERGOCALCIFEROL) 50,000 unit Cap Take 1 capsule (50,000 Units total) by mouth every 7 days. 12 capsule 3    EScitalopram oxalate (LEXAPRO) 20 MG tablet Take 20 mg by mouth every evening.      folic acid (FOLVITE) 1 MG tablet Take 1 tablet (1 mg total) by mouth once daily. 30 tablet 5    furosemide (LASIX) 80 MG tablet Take 1 tablet (80 mg total) by mouth 2 (two) times a day. 180 tablet 3    gabapentin (NEURONTIN) 300 MG capsule 2 (two) times daily.      HYDROmorphone (DILAUDID) 4 MG tablet TAKE 1½ Tablets by mouth EVERY 6 HOURS as needed for SEVERE PAIN 60 tablet 0    HYDROmorphone (DILAUDID) 4 MG tablet TAKE ONE TABLET BY MOUTH EVERY 6 HOURS AS NEEDED FOR SEVERE PAIN 4 tablet 0    HYDROmorphone (DILAUDID) 4 MG tablet TAKE ONE TAB PO Q 6 HOURS PRN SEVERE PAIN 4 tablet 0    insulin aspart, niacinamide, (FIASP PENFILL U-100 INSULIN) 100 unit/mL (3 mL) Crtg pen To use with inPen; 140 max daily dose. 14 pen 11    insulin degludec (TRESIBA FLEXTOUCH U-200) 200 unit/mL (3 mL) insulin pen Inject 60 Units into the skin once daily. Cartridge for novoecho (Patient taking differently: Inject 40 Units into the skin once daily. Cartridge for novoecho) 3 pen 11    ketoconazole (NIZORAL) 2 % cream Apply to affected area DAILY 30 g 3    lamiVUDine (EPIVIR) 150 MG Tab TAKE 1 TABLET BY MOUTH EVERY DAY 30 tablet 23    metoprolol tartrate (LOPRESSOR) 50 MG tablet Take 50 mg by mouth every 12 (twelve) hours.      moxifloxacin (VIGAMOX) 0.5 % ophthalmic solution Place 1 drop into the right eye 3 (three) times " daily. 3 mL 3    multivitamin (THERAGRAN) per tablet Take 1 tablet by mouth once daily.      mupirocin (BACTROBAN) 2 % ointment Apply to affected area 3 times daily 22 g 1    nitroGLYCERIN (NITROSTAT) 0.4 MG SL tablet PLACE 1 TABLET UNDER THE TONGUE EVERY 5 MINUTES AS NEEDED FOR CHEST PAIN. 100 tablet 2    nystatin (MYCOSTATIN) powder Apply topically 4 (four) times daily. Apply to intertriginous areas as directed up to four times daily to reduce moisture. for 14 days 30 g 0    olmesartan (BENICAR) 40 MG tablet TAKE 1 TABLET BY MOUTH EVERY DAY AT NIGHT 90 tablet 3    oxyCODONE-acetaminophen (PERCOCET) 5-325 mg per tablet Take 1 tablet by mouth every 6 (six) hours as needed for Pain. 12 tablet 0    prednisoLONE acetate (PRED FORTE) 1 % DrpS Place 1 drop into the right eye 3 (three) times daily. 10 mL 3    predniSONE (DELTASONE) 2.5 MG tablet TAKE 5 TABLETS (12.5 MG TOTAL) BY MOUTH ONCE DAILY. 150 tablet 2    predniSONE (DELTASONE) 5 MG tablet TAKE 2 TABLETS BY MOUTH ONCE DAILY. 60 tablet 3    PROAIR HFA 90 mcg/actuation inhaler       tirzepatide 7.5 mg/0.5 mL PnIj Inject 7.5 mg into the skin every 7 days. (Patient taking differently: Inject 7.5 mg into the skin every 7 days. THURSDAYS, LAST DOSE 4/11/24) 4 Pen 11    tiZANidine (ZANAFLEX) 4 MG tablet TAKE 1 TABLET BY MOUTH EVERY 8 HOURS AS NEEDED FOR PAIN 30 tablet 2    tocilizumab 162 mg/0.9 mL PnIj Inject 162 mg into the skin once a week. 4 mL 0    tocilizumab 162 mg/0.9 mL PnIj Inject 162 mg into the skin once a week. 4 mL 6    walker Misc 1 Units by Misc.(Non-Drug; Combo Route) route daily as needed (Rollator Walker). 1 each 0     Scheduled Medications:  Current Facility-Administered Medications   Medication Dose Route Frequency    acetaminophen  1,000 mg Oral Q8H    digoxin  0.125 mg Oral Daily    enoxparin  40 mg Subcutaneous Daily    EScitalopram oxalate  20 mg Oral QHS    gabapentin  400 mg Oral TID    Lactobacillus rhamnosus GG  1 capsule Oral Daily     LIDOcaine  1 patch Transdermal Q24H    polyethylene glycol  17 g Oral BID     PRN:   Current Facility-Administered Medications:     dextrose 10%, 12.5 g, Intravenous, PRN    dextrose 10%, 25 g, Intravenous, PRN    glucagon (human recombinant), 1 mg, Intramuscular, PRN    glucose, 16 g, Oral, PRN    glucose, 24 g, Oral, PRN    insulin aspart U-100, 0-5 Units, Subcutaneous, QID (AC + HS) PRN    naloxone, 0.02 mg, Intravenous, PRN    ondansetron, 4 mg, Intravenous, Q8H PRN    oxyCODONE, 5 mg, Oral, Q4H PRN    oxyCODONE, 10 mg, Oral, Q4H PRN    sodium chloride 0.9%, 10 mL, Intravenous, Q12H PRN  Infusions:   Current Facility-Administered Medications   Medication Dose Route Frequency Last Rate Last Admin     Estimated Creatinine Clearance: 133.2 mL/min (based on SCr of 0.6 mg/dL).             Assessment/Plan:      * Intractable pain  -Due to new L1 fracture   -Hydromorphone 1 mg IV q4h prn for severe pain  -Oxycodone 10 mg q4h prn for moderate pain   -Per spine surgery  MRI L sp 4/27 (partially completed): acute L1 compression fx with mild retropulsion. No severe stenosis. post kyphopasty at T11, L3   -Xrays pending now TSLO brace  -Repeat MRI w/ contrast w/ sedation neg for path fracture;  -chest CT neg for any definitive malignancy ; has lung nodules - pt rec'd outpt f/u.  -will await therapy session w/ PT to see if pt's pain is controlled or needs kyphoplasty.      5/2 awaiting PT session to see if pt can tolerate pain off dilaudid; otherwise  needs kyphoplasty. off  dilaudid and continue oxycodone 5/10mg q 4h PRN.continue tylenol 1000mg q8h, Gabapentin BID and robaxin. needs SNF        Constipation  started on miralax BID      Nephrolithiasis  . Bilateral nonobstructive nephrolithiasis. UA negative       Enteritis  improved as above       Diarrhea of presumed infectious origin  C diff testing negative  Afebrile; no abx needed at this time  Seems resolved now  CT showing   Liquid stool throughout the large bowel and  within several small bowel loops, taken together, suggests diarrheal illness possibly in the setting of enteritis. And Left perinephric fat stranding noting somewhat delayed excretion of contrast by the left kidney.     Imodium prn   probiotic  CRP is downtrending      Lumbar vertebral fracture    5/2  s/p T11 and L3 kyphoplasty with pain management Dr. Aviles on 4/22/2024, now with worsening back pain and diarrhea. Has had multiple ED visits in the past week, CT A/P and MRI show new acute compression fx of L1 with mild retropulsion. In significant pain but remains neuro intact. MRI L sp 4/27 (partially completed): acute L1 compression fx with mild retropulsion. No severe stenosis. S/p kyphopasty at T11, L3. CT chest malignancy workup completed, R upper lobe opacity. Read reports malignancy less likely. s/p NSGY Eval - multimodal pain control with narcotics, tylenol/NSAIDs, muscle relaxants. TLSO brace when upright/OOB. No clear evidence of pathological fracture. consider L1 kyphoplasty for ongoing pain in the future. Patient not interested at this time. needs Optimization of bone quality due to  osteopenia on DXA scan from 2023. awaiting PT session to see if pt can tolerate pain off dilaudid; otherwise  needs kyphoplasty. off  dilaudid and continue oxycodone 5/10mg q 4h PRN.continue tylenol 1000mg q8h, Gabapentin BID and robaxin. needs SNF      Rheumatoid arthritis involving multiple sites with positive rheumatoid factor  Hx of RA on tocilizumab, last in march 2024, supposed to be on lamivudine for hep B ppx    Supraventricular tachycardia  Hx of SVT on digoxin, coreg but hasn't been taking coreg    Continue digoxin for now   Will give IV magnesium 2gm  Optimize potassium > 4    Coronary artery disease  Hx of CAD  Hold ASA for now     Type 2 diabetes mellitus with hyperglycemia, with long-term current use of insulin  Hasn't been taking insulin  Will hold off on scheduled insulin  Monitor glucose AC/HS and utilize  prn insulin     Patient's FSGs are controlled on current hypoglycemics.   Last A1c reviewed-   Lab Results   Component Value Date    HGBA1C 7.4 (H) 03/01/2024    HGBA1C 7.0 (H) 12/01/2023    HGBA1C 7.1 (H) 09/29/2023     Will hold PO hypoglycemics and will start correctional scale insulin  Most recent fingerstick glucose reviewed-   Recent Labs   Lab 05/03/24  1155 05/03/24  1628 05/04/24  0609 05/04/24  0740   POCTGLUCOSE 193* 153* 149* 160*     currently on   Antihyperglycemics (From admission, onward)      Start     Stop Route Frequency Ordered    04/27/24 0133  insulin aspart U-100 pen 0-5 Units         -- SubQ Before meals & nightly PRN 04/27/24 0133             Chronic diastolic heart failure  Hasn't been on lasix   Currently hypovolemic due to diarrhea, s/p 1 L of IVF in the ED  Will encourage PO hydration and utilize IV fluids if needed     Echo    Interpretation Summary    Left Ventricle: The left ventricle is normal in size. Increased wall thickness. There is concentric remodeling. There is normal systolic function with a visually estimated ejection fraction of 55 - 60%. Grade I diastolic dysfunction.    Right Ventricle: Normal right ventricular cavity size. Wall thickness is normal. Right ventricle wall motion  is normal. Systolic function is normal.         Hepatitis B core antibody positive  Patient is supposed to be on ppx with lamivudine but hasn't been taking it for few weeks  Will hold off resuming it for now, needs to be resumed at discharge         VTE Risk Mitigation (From admission, onward)           Ordered     enoxaparin injection 40 mg  Daily         04/27/24 0133     IP VTE HIGH RISK PATIENT  Once         04/27/24 0133     Place sequential compression device  Until discontinued         04/27/24 0133                    Discharge Planning   ALEXANDER: 5/7/2024     Code Status: Full Code   Is the patient medically ready for discharge?: (No Documentation)    Reason for patient still in hospital  (select all that apply): Treatment  Discharge Plan A: Skilled Nursing Facility                  Sung Scherer MD  Department of Hospital Medicine   [facility] - Neurosurgery ([facility])

## 2024-05-06 PROBLEM — N50.812 TESTICULAR PAIN, LEFT: Status: ACTIVE | Noted: 2024-05-06

## 2024-05-06 LAB
ALBUMIN SERPL BCP-MCNC: 1.7 G/DL (ref 3.5–5.2)
ALP SERPL-CCNC: 129 U/L (ref 55–135)
ALT SERPL W/O P-5'-P-CCNC: 10 U/L (ref 10–44)
ANION GAP SERPL CALC-SCNC: 7 MMOL/L (ref 8–16)
AST SERPL-CCNC: 17 U/L (ref 10–40)
BASOPHILS # BLD AUTO: 0.06 K/UL (ref 0–0.2)
BASOPHILS NFR BLD: 0.8 % (ref 0–1.9)
BILIRUB SERPL-MCNC: 0.2 MG/DL (ref 0.1–1)
BILIRUB UR QL STRIP: NEGATIVE
BUN SERPL-MCNC: 13 MG/DL (ref 8–23)
CALCIUM SERPL-MCNC: 7.8 MG/DL (ref 8.7–10.5)
CHLORIDE SERPL-SCNC: 100 MMOL/L (ref 95–110)
CLARITY UR REFRACT.AUTO: CLEAR
CO2 SERPL-SCNC: 28 MMOL/L (ref 23–29)
COLOR UR AUTO: YELLOW
CREAT SERPL-MCNC: 0.7 MG/DL (ref 0.5–1.4)
DIFFERENTIAL METHOD BLD: ABNORMAL
EOSINOPHIL # BLD AUTO: 0.1 K/UL (ref 0–0.5)
EOSINOPHIL NFR BLD: 1.3 % (ref 0–8)
ERYTHROCYTE [DISTWIDTH] IN BLOOD BY AUTOMATED COUNT: 12.3 % (ref 11.5–14.5)
EST. GFR  (NO RACE VARIABLE): >60 ML/MIN/1.73 M^2
GLUCOSE SERPL-MCNC: 214 MG/DL (ref 70–110)
GLUCOSE UR QL STRIP: NEGATIVE
HCT VFR BLD AUTO: 42 % (ref 40–54)
HGB BLD-MCNC: 13.9 G/DL (ref 14–18)
HGB UR QL STRIP: NEGATIVE
IMM GRANULOCYTES # BLD AUTO: 0.13 K/UL (ref 0–0.04)
IMM GRANULOCYTES NFR BLD AUTO: 1.7 % (ref 0–0.5)
KETONES UR QL STRIP: ABNORMAL
LEUKOCYTE ESTERASE UR QL STRIP: NEGATIVE
LYMPHOCYTES # BLD AUTO: 1.3 K/UL (ref 1–4.8)
LYMPHOCYTES NFR BLD: 17 % (ref 18–48)
MAGNESIUM SERPL-MCNC: 1.5 MG/DL (ref 1.6–2.6)
MCH RBC QN AUTO: 32.7 PG (ref 27–31)
MCHC RBC AUTO-ENTMCNC: 33.1 G/DL (ref 32–36)
MCV RBC AUTO: 99 FL (ref 82–98)
MONOCYTES # BLD AUTO: 1.3 K/UL (ref 0.3–1)
MONOCYTES NFR BLD: 17.6 % (ref 4–15)
NEUTROPHILS # BLD AUTO: 4.7 K/UL (ref 1.8–7.7)
NEUTROPHILS NFR BLD: 61.6 % (ref 38–73)
NITRITE UR QL STRIP: NEGATIVE
NRBC BLD-RTO: 0 /100 WBC
PH UR STRIP: 5 [PH] (ref 5–8)
PLATELET # BLD AUTO: 250 K/UL (ref 150–450)
PMV BLD AUTO: 11.4 FL (ref 9.2–12.9)
POCT GLUCOSE: 170 MG/DL (ref 70–110)
POCT GLUCOSE: 176 MG/DL (ref 70–110)
POCT GLUCOSE: 223 MG/DL (ref 70–110)
POTASSIUM SERPL-SCNC: 4 MMOL/L (ref 3.5–5.1)
PROT SERPL-MCNC: 4.6 G/DL (ref 6–8.4)
PROT UR QL STRIP: NEGATIVE
RBC # BLD AUTO: 4.25 M/UL (ref 4.6–6.2)
SODIUM SERPL-SCNC: 135 MMOL/L (ref 136–145)
SP GR UR STRIP: 1.02 (ref 1–1.03)
URN SPEC COLLECT METH UR: ABNORMAL
WBC # BLD AUTO: 7.57 K/UL (ref 3.9–12.7)

## 2024-05-06 PROCEDURE — 97116 GAIT TRAINING THERAPY: CPT

## 2024-05-06 PROCEDURE — 25000003 PHARM REV CODE 250: Performed by: HOSPITALIST

## 2024-05-06 PROCEDURE — 25000003 PHARM REV CODE 250

## 2024-05-06 PROCEDURE — 83735 ASSAY OF MAGNESIUM: CPT | Performed by: HOSPITALIST

## 2024-05-06 PROCEDURE — 97110 THERAPEUTIC EXERCISES: CPT

## 2024-05-06 PROCEDURE — 85025 COMPLETE CBC W/AUTO DIFF WBC: CPT | Performed by: HOSPITALIST

## 2024-05-06 PROCEDURE — 63600175 PHARM REV CODE 636 W HCPCS: Performed by: HOSPITALIST

## 2024-05-06 PROCEDURE — 63600175 PHARM REV CODE 636 W HCPCS: Performed by: INTERNAL MEDICINE

## 2024-05-06 PROCEDURE — 11000001 HC ACUTE MED/SURG PRIVATE ROOM

## 2024-05-06 PROCEDURE — 36415 COLL VENOUS BLD VENIPUNCTURE: CPT | Performed by: HOSPITALIST

## 2024-05-06 PROCEDURE — 81003 URINALYSIS AUTO W/O SCOPE: CPT | Performed by: HOSPITALIST

## 2024-05-06 PROCEDURE — 80053 COMPREHEN METABOLIC PANEL: CPT | Performed by: HOSPITALIST

## 2024-05-06 PROCEDURE — 25000003 PHARM REV CODE 250: Performed by: INTERNAL MEDICINE

## 2024-05-06 RX ORDER — MAGNESIUM SULFATE HEPTAHYDRATE 40 MG/ML
2 INJECTION, SOLUTION INTRAVENOUS ONCE
Status: COMPLETED | OUTPATIENT
Start: 2024-05-06 | End: 2024-05-06

## 2024-05-06 RX ORDER — DOXYCYCLINE HYCLATE 100 MG
100 TABLET ORAL EVERY 12 HOURS
Status: DISCONTINUED | OUTPATIENT
Start: 2024-05-06 | End: 2024-05-08 | Stop reason: HOSPADM

## 2024-05-06 RX ADMIN — ACETAMINOPHEN 1000 MG: 500 TABLET ORAL at 06:05

## 2024-05-06 RX ADMIN — Medication 1 CAPSULE: at 10:05

## 2024-05-06 RX ADMIN — GABAPENTIN 400 MG: 400 CAPSULE ORAL at 02:05

## 2024-05-06 RX ADMIN — OXYCODONE HYDROCHLORIDE 10 MG: 10 TABLET ORAL at 11:05

## 2024-05-06 RX ADMIN — GABAPENTIN 400 MG: 400 CAPSULE ORAL at 09:05

## 2024-05-06 RX ADMIN — POLYETHYLENE GLYCOL 3350 17 G: 17 POWDER, FOR SOLUTION ORAL at 10:05

## 2024-05-06 RX ADMIN — POLYETHYLENE GLYCOL 3350 17 G: 17 POWDER, FOR SOLUTION ORAL at 09:05

## 2024-05-06 RX ADMIN — DIGOXIN 0.12 MG: 125 TABLET ORAL at 10:05

## 2024-05-06 RX ADMIN — ACETAMINOPHEN 1000 MG: 500 TABLET ORAL at 02:05

## 2024-05-06 RX ADMIN — OXYCODONE HYDROCHLORIDE 10 MG: 10 TABLET ORAL at 06:05

## 2024-05-06 RX ADMIN — ENOXAPARIN SODIUM 40 MG: 40 INJECTION SUBCUTANEOUS at 05:05

## 2024-05-06 RX ADMIN — OXYCODONE HYDROCHLORIDE 10 MG: 10 TABLET ORAL at 03:05

## 2024-05-06 RX ADMIN — DOXYCYCLINE HYCLATE 100 MG: 100 TABLET, COATED ORAL at 09:05

## 2024-05-06 RX ADMIN — ESCITALOPRAM OXALATE 20 MG: 20 TABLET, FILM COATED ORAL at 09:05

## 2024-05-06 RX ADMIN — LIDOCAINE 1 PATCH: 50 PATCH CUTANEOUS at 05:05

## 2024-05-06 RX ADMIN — GABAPENTIN 400 MG: 400 CAPSULE ORAL at 10:05

## 2024-05-06 RX ADMIN — MAGNESIUM SULFATE HEPTAHYDRATE 2 G: 40 INJECTION, SOLUTION INTRAVENOUS at 02:05

## 2024-05-06 RX ADMIN — OXYCODONE HYDROCHLORIDE 10 MG: 10 TABLET ORAL at 10:05

## 2024-05-06 RX ADMIN — ACETAMINOPHEN 1000 MG: 500 TABLET ORAL at 09:05

## 2024-05-06 RX ADMIN — DOXYCYCLINE HYCLATE 100 MG: 100 TABLET, COATED ORAL at 02:05

## 2024-05-06 NOTE — PT/OT/SLP PROGRESS
"Physical Therapy Treatment    Patient Name:  Nithin Wagner   MRN:  [id number]    Recommendations:     Discharge Recommendations: Moderate Intensity Therapy  Discharge Equipment Recommendations: walker, rolling  Barriers to discharge: None    Assessment:     Nithin Wagner is a 68 y.o. male admitted with a medical diagnosis of Intractable pain.  He presents with the following impairments/functional limitations: weakness, impaired endurance, impaired self care skills, impaired functional mobility, gait instability, pain. Patient continues to progress with mobility, requiring no assistance for bed mobility or sit to stand transfers. Patient was unable to complete full flight of stair navigation due to fatigue and requires rest breaks while ambulating, however ambulates with no balance deficits with 2ww. He reports feelings of L knee near-buckling with fatigue however no signs of buckling noted during session. Patient reports feeling as if he is not ready to return home at this moment without additional therapy. Patient has daily assistance from nephew; does have bathroom on 2nd floor. Patient would benefit from moderate intensity therapy to address deficits and return to PLOF.     Rehab Prognosis: Good; patient would benefit from acute skilled PT services to address these deficits and reach maximum level of function.    Recent Surgery: Procedure(s) (LRB):  MRI (Magnetic Resonance Imagine) (N/A) 6 Days Post-Op    Plan:     During this hospitalization, patient to be seen 3 x/week to address the identified rehab impairments via gait training, therapeutic activities, therapeutic exercises, neuromuscular re-education, wheelchair management/training and progress toward the following goals:    Plan of Care Expires:  06/01/24    Subjective     Chief Complaint: pain  Patient/Family Comments/goals: "I think I need another week" re: mobility   Pain/Comfort:  Pain Rating 1: 7/10  Location - Side 1: Left  Location - Orientation 1: " generalized  Location 1: scrotum  Pain Addressed 1: Reposition, Distraction  Pain Rating Post-Intervention 1: 8/10      Objective:     Patient found HOB elevated with Other (comments) (no active lines) upon PT entry to room.     General Precautions: Standard, fall  Orthopedic Precautions: spinal precautions  Braces: TLSO  Respiratory Status: Room air     Functional Mobility:  Bed Mobility:     Rolling Left:  stand by assistance  Scooting: stand by assistance  Supine to Sit: stand by assistance  Transfers:     Sit to Stand:    Bed: stand by assistance with rolling walker  Chair: CGA with 2ww, increased time due to lower chair height   Gait: patient ambulated 55' with SBA with 2ww, 2 standing rest breaks due to fatigue, lightheadedness resolving quickly. Chair follow.   Deviations Noted: decreased gait speed and decreased step length R, L   Balance:   Sitting static: independent   Sitting dynamic: independent   Standing static: supervision with 2ww  Standing dynamic: SBA with 2ww   Stairs:  patient navigated 3 stairs with CGA and increased time  Ascent: BHR, step to pattern  Descent: BHR, step to pattern  Required seated rest break following stairs      AM-PAC 6 CLICK MOBILITY  Turning over in bed (including adjusting bedclothes, sheets and blankets)?: 4  Sitting down on and standing up from a chair with arms (e.g., wheelchair, bedside commode, etc.): 4  Moving from lying on back to sitting on the side of the bed?: 4  Moving to and from a bed to a chair (including a wheelchair)?: 4  Need to walk in hospital room?: 4  Climbing 3-5 steps with a railing?: 4  Basic Mobility Total Score: 24       Treatment & Education:  Patient educated on components of brace, appropriate times to don/doff, verbal cues and [facility] guidance for donning/doffing brace.   Education and demonstration of safe stair navigation with step to pattern/BUE railing use for improved pain, exercise tolerance, balance, and to address overall safety.    Cues  during ambulation for sequencing, weight shifting, upright posture throughout, appropriate step length and height.   Verbal and tactile cues provided during mobility for walker management and safety including hand placement on walker vs chair during transfers and positioning of walker in relation to body.   Mobility performed with assistance from rehab tech, under direct supervision and direction of therapist.     Patient left up in chair with all lines intact and call button in reach.    GOALS:   Multidisciplinary Problems       Physical Therapy Goals          Problem: Physical Therapy    Goal Priority Disciplines Outcome Goal Variances Interventions   Physical Therapy Goal     PT, PT/OT Progressing     Description: Goals to be met by: 24     Patient will increase functional independence with mobility by performin. Sit to stand transfer with [place]  2. Bed to chair transfer with [place] using Rolling Walker  3. Gait  x 150 feet with Supervision using Rolling Walker.   4. Ascend/descend 12 stair with bilateral Handrails Supervision using Single-point Cane .   5. Stand for 10 minutes with Supervision using Rolling Walker  6. Increased functional strength to WNL for ambulation and stairs to improve ambulation quality and decrease LLE buckling.                         Time Tracking:     PT Received On: 24  PT Start Time: 828     PT Stop Time: 853  PT Total Time (min): 25 min     Billable Minutes: Gait Training 15 minutes and Therapeutic Exercise 10 minutes    Treatment Type: Treatment  PT/PTA: PT     Number of PTA visits since last PT visit: 0     2024

## 2024-05-06 NOTE — PLAN OF CARE
[facility] (Hospital)  Discharge Reassessment    Primary Care Provider: Tushar Drew MD    Expected Discharge Date: 5/7/2024    Reassessment (most recent)       Discharge Reassessment - 05/06/24 1147          Discharge Reassessment    Assessment Type Discharge Planning Reassessment     Did the patient's condition or plan change since previous assessment? Yes     Discharge Plan discussed with: Patient   nephew Anjel Waters    Communicated ALEXANDER with patient/caregiver Date not available/Unable to determine     Discharge Plan A Skilled Nursing Facility     Discharge Plan B Home Health     DME Needed Upon Discharge  none     Transition of Care Barriers None     Why the patient remains in the hospital Requires continued medical care (P)                    Pt not ready for discharge due to: hypovolemic due to diarrhea,   CM spoke with patient's nephew Anjel regarding acceptance of [facility] and other facilies declined due to being out of network. Anjel is agreeable to patient going to [facility]. CM responded to [facility] via careport with the nephew's Anjel information for paperwork.   CM will remain available for families/patient on NPU.  Currently pt has d/c plans in progress at this time.    Discharge Plan A and Plan B have been determined by review of patient's clinical status, future medical and therapeutic needs, and coverage/benefits for post-acute care in coordination with multidisciplinary team members.     [address] responded in Careport patient's nephew has been sent paperwork and ALEXANDER. CM responded with ALEXANDER for tomorrow and sent updated Clinicals in Careport. Facility is to submit for auth.        05/06/24 1532   Post-Acute Status   Post-Acute Authorization Placement   Post-Acute Placement Status Pending payor review/awaiting authorization (if required)   Discharge Plan   Discharge Plan A Skilled Nursing Facility   Discharge Plan B Home Health

## 2024-05-06 NOTE — ASSESSMENT & PLAN NOTE
5/6 c/o left sided testicular pain 4/10. no dysuria. 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 Repeat washout performed 11/17/22. US scrotum and testes -No definite acute testicular findings.  No evidence of torsion at the present time. Small simple appearing right-sided hydrocele. Asymmetrically increased caliber of the left epididymis with relative hypodensity and question of surrounding increased vascularity.  likely epididymitis . started on Doxycycline BID x 10 days.  scrotal support . towel under scrotum. icepacks as needed

## 2024-05-06 NOTE — PROGRESS NOTES
Devin Rock - Neurosurgery ([facility])  [facility] Medicine  Progress Note    Patient Name: Nithin Wagner  MRN: [id number]  Patient Class: IP- Inpatient   Admission Date: 4/26/2024  Length of Stay: 8 days  Attending Physician: Sung Scherer MD  Primary Care Provider: Tushar Drew MD        Subjective:     Principal Problem:Intractable pain        HPI:  Pt is a 68yoM s/p s/p T11 and L3 kyphoplasty on 4/22/2024, now with worsening back pain and diarrhea. Has had multiple ED visits in the past week, CT A/P done yesterday showed Interval compression deformity involving the superior endplate of L1 as well as enteritis, he was discharged home on cipro/flagyl but never started taking them. Was seen by pain management and recommended admission for intractable pain.     in the ED, vital signs are stable, cbc/cmp unremarkable, ESR/CRP down trending.  patient has no red flags for infection/spinal involvement beyond pain and has reassuring neurological examination.  Patient has  required 2 mg of IV hydromorphone.      At bedside, complained of low back pain, denies worsening numbness in his legs, no fecal/urinary incontinence, no fevers, no night sweats. Complained of watery diarrhea for few days, he states that few of his friends have contracted a diarrheal illness as well. Diarrhea is associated with abdominal pain, no nausea/no vomiting. Hasn't been able to eat well and hasn't taking most of his medications in the past few days.     Overview/Hospital Course:  Per NSGY L-spine MRI (partially completed) read showed   acute L1 compression fx with mild retropulsion. No severe stenosis. post kyphopasty at T11, L3. C diff was negative, started on imodium. Pt reports improvement in diarrhea; 1 loose nonbloody bm since 4 am, not as watery anymore. CT chest neg for any definitive malignancy, has nonspecific lung nodules, outpt f/u.     5/2  s/p T11 and L3 kyphoplasty with pain management Dr. Aviles on 4/22/2024, now with worsening  back pain and diarrhea. Has had multiple ED visits in the past week, CT A/P and MRI show new acute compression fx of L1 with mild retropulsion. In significant pain but remains neuro intact. MRI L sp 4/27 (partially completed): acute L1 compression fx with mild retropulsion. No severe stenosis. S/p kyphopasty at T11, L3. CT chest malignancy workup completed, R upper lobe opacity. Read reports malignancy less likely. s/p NSGY Eval - multimodal pain control with narcotics, tylenol/NSAIDs, muscle relaxants. TLSO brace when upright/OOB. No clear evidence of pathological fracture. consider L1 kyphoplasty for ongoing pain in the future. Patient not interested at this time. needs Optimization of bone quality due to  osteopenia on DXA scan from 2023. awaiting PT session to see if pt can tolerate pain off dilaudid; otherwise  needs kyphoplasty. off  dilaudid and continue oxycodone 5/10mg q 4h PRN.continue tylenol 1000mg q8h, Gabapentin BID and robaxin. X-Ray lumbar lateral supine and upright to assess stability of fracture in the brace- Compression fractures of T11, L1 and L3, status post vertebroplasty at T11 and L3 .  needs SNF. received 3 doses of IV dilaudid 0.5mg . lidocaine patch  5/3 did not receive IV dilaudid today. B12 1500s and folate levels WNL  5/4 tolerating pain with oral oxycodone 10mg q 4h prn. completed robaxin, c/o pain in the left axilla - no skin changes.   5/5 DVT sono left UE - No thrombus in central veins of the left upper extremity.  5/6 c/o left sided testicular pain 4/10. no dysuria. 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 Repeat washout performed 11/17/22. US scrotum and testes -No definite acute testicular findings.  No evidence of torsion at the present time. Small simple appearing right-sided hydrocele. Asymmetrically increased caliber of the left epididymis with relative hypodensity and question of surrounding increased  vascularity.  Correlation for signs/symptoms of epididymitis         Review of Systems:   Pain scale:   Constitutional:  fever,  chills, headache, vision loss, hearing loss, weight loss, Generalized weakness, falls, loss of smell, loss of taste, poor appetite,  sore throat  Respiratory: cough, shortness of breath.   Cardiovascular: chest pain, dizziness, palpitations, orthopnea, swelling of feet, syncope  Gastrointestinal: nausea, vomiting, abdominal pain, diarrhea, black stool,  blood in stool, change in bowel habits, constipation  Genitourinary: hematuria, dysuria, urgency, frequency, left testicular pain  Integument/Breast: rash,  pruritis  Hematologic/Lymphatic: easy bruising, lymphadenopathy  Musculoskeletal: arthralgias , myalgias,  back pain -improved , neck pain, knee pain  Neurological: confusion, seizures, tremors, slurred speech  Behavioral/Psych:  depression, anxiety, auditory or visual hallucinations     OBJECTIVE:     Physical Exam:  Body mass index is 28.47 kg/m².    Constitutional: Appears well-developed and well-nourished.   Head: Normocephalic and atraumatic.   Neck: Normal range of motion. Neck supple.   Cardiovascular: Normal heart rate.  Regular heart rhythm.  Pulmonary/Chest: Effort normal.   Abdominal: No distension.  No tenderness  Musculoskeletal: Normal range of motion. + edema. lumbar tendenesss   Neurological: Alert and oriented to person, place, and time. able to move bilateral upper and lower extremities without limitation   Skin: Skin is warm and dry.   Psychiatric: Normal mood and affect. Behavior is normal.                  Vital Signs  Temp: 98.4 °F (36.9 °C) (05/06/24 1133)  Pulse: 95 (05/06/24 1133)  Resp: 18 (05/06/24 1133)  BP: (Abnormal) 109/59 (05/06/24 1133)  SpO2: (Abnormal) 92 % (05/06/24 1133)     24 Hour VS Range    Temp:  [98.2 °F (36.8 °C)-99.5 °F (37.5 °C)]   Pulse:  []   Resp:  [16-22]   BP: (109-147)/(59-92)   SpO2:  [90 %-93 %]     Intake/Output Summary (Last  "24 hours) at 5/6/2024 1400  Last data filed at 5/6/2024 1341  Gross per 24 hour   Intake 1000 ml   Output 1650 ml   Net -650 ml         I/O This Shift:  I/O this shift:  In: -   Out: 600 [Urine:600]    Wt Readings from Last 3 Encounters:   04/30/24 92.6 kg (204 lb 2.3 oz)   04/26/24 91.3 kg (201 lb 4.5 oz)   04/25/24 99.8 kg (220 lb)       I have personally reviewed the vitals and recorded Intake/Output     Laboratory/Diagnostic Data:    CBC/Anemia Labs: Coags:    Recent Labs   Lab 05/03/24  0404 05/03/24  1034 05/06/24  1222   WBC 6.71  --  7.57   HGB 15.0  --  13.9*   HCT 46.9  --  42.0     --  250   *  --  99*   RDW 12.2  --  12.3   FOLATE  --  12.4  --    [id number]  --  1517*  --     No results for input(s): "PT", "INR", "APTT" in the last 168 hours.     Chemistries: ABG:   Recent Labs   Lab 05/03/24  0404 05/06/24  1222   * 135*   K 4.2 4.0    100   CO2 23 28   BUN 7* 13   CREATININE 0.6 0.7   CALCIUM 8.2* 7.8*   PROT 5.4* 4.6*   BILITOT 0.3 0.2   ALKPHOS 108 129   ALT 10 10   AST 18 17   MG 1.7 1.5*    No results for input(s): "PH", "PCO2", "PO2", "HCO3", "POCSATURATED", "BE" in the last 168 hours.     POCT Glucose: HbA1c:    Recent Labs   Lab 05/04/24  2044 05/05/24  0845 05/05/24  1520 05/05/24  2102 05/06/24  0738 05/06/24  1135   POCTGLUCOSE 192* 176* 185* 162* 170* 176*    Hemoglobin A1C   Date Value Ref Range Status   03/01/2024 7.4 (H) 4.0 - 5.6 % Final     Comment:     ADA Screening Guidelines:  5.7-6.4%  Consistent with prediabetes  >or=6.5%  Consistent with diabetes    High levels of fetal hemoglobin interfere with the HbA1C  assay. Heterozygous hemoglobin variants (HbS, HgC, etc)do  not significantly interfere with this assay.   However, presence of multiple variants may affect accuracy.     12/01/2023 7.0 (H) 4.0 - 5.6 % Final     Comment:     ADA Screening Guidelines:  5.7-6.4%  Consistent with prediabetes  >or=6.5%  Consistent with diabetes    High levels of fetal " "hemoglobin interfere with the HbA1C  assay. Heterozygous hemoglobin variants (HbS, HgC, etc)do  not significantly interfere with this assay.   However, presence of multiple variants may affect accuracy.     09/29/2023 7.1 (H) 4.0 - 5.6 % Final     Comment:     ADA Screening Guidelines:  5.7-6.4%  Consistent with prediabetes  >or=6.5%  Consistent with diabetes    High levels of fetal hemoglobin interfere with the HbA1C  assay. Heterozygous hemoglobin variants (HbS, HgC, etc)do  not significantly interfere with this assay.   However, presence of multiple variants may affect accuracy.          Cardiac Enzymes: Ejection Fractions:    No results for input(s): "CPK", "CPKMB", "MB", "TROPONINI" in the last 72 hours. EF   Date Value Ref Range Status   10/28/2022 55 % Final   12/22/2021 57 % Final          Recent Labs   Lab 05/06/24  1319   COLORU Yellow   APPEARANCEUA Clear   PHUR 5.0   SPECGRAV 1.025   PROTEINUA Negative   GLUCUA Negative   KETONESU Trace*   BILIRUBINUA Negative   OCCULTUA Negative   NITRITE Negative   LEUKOCYTESUR Negative       Procalcitonin (ng/mL)   Date Value   01/06/2023 0.03   11/24/2022 0.19   11/15/2022 48.84 (H)     Lactate (Lactic Acid) (mmol/L)   Date Value   11/15/2022 2.2   11/15/2022 4.4 (HH)   10/26/2022 1.6   10/20/2022 1.9     BNP (pg/mL)   Date Value   04/26/2024 157 (H)   04/03/2024 177 (H)   02/09/2024 176 (H)   12/09/2022 83   11/15/2022 424 (H)     CRP (mg/L)   Date Value   04/26/2024 29.4 (H)   04/24/2024 63.0 (H)   01/06/2023 19.7 (H)   10/21/2022 8.8 (H)   10/21/2022 8.8 (H)   07/21/2022 0.4   04/22/2022 0.2   07/21/2021 0.8   11/24/2020 0.4   09/22/2020 0.6     Sed Rate (mm/Hr)   Date Value   04/26/2024 41 (H)   01/06/2023 78 (H)   10/21/2022 3   10/21/2022 3   07/21/2022 2   04/22/2022 2   07/21/2021 1   11/24/2020 2   09/22/2020 1   08/14/2020 2     D-Dimer (mg/L FEU)   Date Value   10/26/2022 0.40   11/10/2020 0.50 (H)   09/22/2020 0.45     Ferritin (ng/mL)   Date Value "   05/05/2020 134   04/07/2020 163     LD (U/L)   Date Value   11/04/2021 260     Troponin I (ng/mL)   Date Value   04/26/2024 <0.006   02/09/2024 0.020   11/16/2022 0.357 (H)   11/15/2022 0.551 (H)   11/15/2022 0.455 (H)   10/26/2022 0.035 (H)   10/20/2022 0.016   10/20/2022 0.013     CPK (U/L)   Date Value   05/30/2022 68   03/10/2021 89   11/10/2020 133   12/22/2009 579 (H)     Results for orders placed or performed in visit on 09/29/23   Vitamin D   Result Value Ref Range    Vit D, 25-Hydroxy 30 30 - 96 ng/mL   Results for orders placed or performed in visit on 10/21/22   Vitamin D   Result Value Ref Range    Vit D, 25-Hydroxy 20 (L) 30 - 96 ng/mL     *Note: Due to a large number of results and/or encounters for the requested time period, some results have not been displayed. A complete set of results can be found in Results Review.     SARS-CoV2 (COVID-19) Qualitative PCR (no units)   Date Value   01/17/2023 Not Detected   01/13/2023 Not Detected   01/10/2023 Not Detected   01/06/2023 Not Detected   01/03/2023 Not Detected   12/29/2022 Not Detected   12/21/2022 Not Detected   12/12/2022 Not Detected   08/25/2021 Not Detected   03/07/2021 Not Detected     SARS-CoV-2 RNA, Amplification, Qual (no units)   Date Value   01/27/2023 Negative   10/20/2022 Negative     POC Rapid COVID (no units)   Date Value   02/09/2024 Negative   09/21/2023 Negative   10/26/2022 Negative   08/20/2021 Negative   08/18/2021 Negative       Microbiology labs for the last week  Microbiology Results (last 7 days)       Procedure Component Value Units Date/Time    Blood Culture #2 **CANNOT BE ORDERED STAT** [[id number]] Collected: 04/26/24 1712    Order Status: Completed Specimen: Blood from Peripheral, Antecubital, Right Updated: 05/01/24 1812     Blood Culture, Routine No growth after 5 days.    Blood Culture #1 **CANNOT BE ORDERED STAT** [[id number]] Collected: 04/26/24 1712    Order Status: Completed Specimen: Blood from Peripheral,  Forearm, Left Updated: 05/01/24 1812     Blood Culture, Routine No growth after 5 days.            Reviewed and noted in plan where applicable- Please see chart for full lab data.    Lines/Drains:       Peripheral IV - Single Lumen 04/28/24 1026 22 G Posterior;Proximal;Right Forearm (Active)   Site Assessment Clean;Dry;Intact;No redness;No swelling 05/02/24 0400   Extremity Assessment Distal to IV No abnormal discoloration;No redness;No swelling;No warmth 05/02/24 0400   Line Status Flushed 05/02/24 0400   Dressing Status Clean;Dry;Intact 05/02/24 0400   Dressing Intervention Integrity maintained 05/02/24 0400   Dressing Change Due 05/02/24 05/02/24 0400   Site Change Due 05/02/24 05/02/24 0400   Reason Not Rotated Not due 05/02/24 0400   Number of days: 3       Imaging      Results for orders placed during the hospital encounter of 04/03/24    Echo    Interpretation Summary    Left Ventricle: The left ventricle is normal in size. Increased wall thickness. There is concentric remodeling. There is normal systolic function with a visually estimated ejection fraction of 55 - 60%. Grade I diastolic dysfunction.    Right Ventricle: Normal right ventricular cavity size. Wall thickness is normal. Right ventricle wall motion  is normal. Systolic function is normal.      US SCROTUM AND TESTICLES WITH DOPPLER (XPD)  Narrative: EXAMINATION:  US SCROTUM AND TESTICLES WITH DOPPLER (XPD)    CLINICAL HISTORY:  left testicle pain;    TECHNIQUE:  Sonography of the scrotum and testes.    COMPARISON:  None.    FINDINGS:  Right Testicle:    *Size: 4.9 x 2.1 x 3.4 cm  *Appearance: Normal.  *Flow: Normal arterial and venous flow  *Epididymis: Normal.  *Hydrocele: Small simple appearing hydrocele.  *Varicocele: None.  .    Left Testicle:    *Size: 5 x 2.3 x 2.5 cm  *Appearance: Normal.  *Flow: Normal arterial and venous flow  *Epididymis: Asymmetric thickening and relative echogenicity of the epididymis.  Question of increased vascularity  "peripherally.  Anechoic, cystic structure at the epididymis measures 7 x 5 x 4 mm in keeping with cysts versus spermatocele.  Additionally noted is an adjoining echogenic focus with the epididymis measuring on the order of 2 x 2 x 2 mm, possibly nonspecific calcification.  *Hydrocele: None.  *Varicocele: None.  .    Other findings: None.  Impression: 1. No definite acute testicular findings.  No evidence of torsion at the present time.  2. Small simple appearing right-sided hydrocele.  3. Asymmetrically increased caliber of the left epididymis with relative hypodensity and question of surrounding increased vascularity.  Correlation for signs/symptoms of epididymitis would be recommended.  4. Additional details, as provided in the body of report.    Electronically signed by: Mayco Peng  Date:    05/06/2024  Time:    09:50      Labs, Imaging, EKG and Diagnostic results from 5/6/2024 were reviewed.    Medications:  Medication list was reviewed and changes noted under Assessment/Plan.  No current facility-administered medications on file prior to encounter.     Current Outpatient Medications on File Prior to Encounter   Medication Sig Dispense Refill    aspirin 81 MG Chew Take 81 mg by mouth.      BD ULTRA-FINE RAHUL PEN NEEDLE 32 gauge x 5/32" Ndle To use 6 daily 400 each 3    carvediloL (COREG) 6.25 MG tablet Take 1 tablet (6.25 mg total) by mouth 2 (two) times daily with meals. 180 tablet 3    digoxin (LANOXIN) 125 mcg tablet TAKE 1 TABLET BY MOUTH ONCE DAILY. 90 tablet 3    diltiaZEM (CARDIZEM CD) 120 MG Cp24 Take 120 mg by mouth Daily.      ergocalciferol (ERGOCALCIFEROL) 50,000 unit Cap Take 1 capsule (50,000 Units total) by mouth every 7 days. 12 capsule 3    EScitalopram oxalate (LEXAPRO) 20 MG tablet Take 20 mg by mouth every evening.      folic acid (FOLVITE) 1 MG tablet Take 1 tablet (1 mg total) by mouth once daily. 30 tablet 5    furosemide (LASIX) 80 MG tablet Take 1 tablet (80 mg total) by mouth 2 " (two) times a day. 180 tablet 3    gabapentin (NEURONTIN) 300 MG capsule 2 (two) times daily.      HYDROmorphone (DILAUDID) 4 MG tablet TAKE 1½ Tablets by mouth EVERY 6 HOURS as needed for SEVERE PAIN 60 tablet 0    HYDROmorphone (DILAUDID) 4 MG tablet TAKE ONE TABLET BY MOUTH EVERY 6 HOURS AS NEEDED FOR SEVERE PAIN 4 tablet 0    HYDROmorphone (DILAUDID) 4 MG tablet TAKE ONE TAB PO Q 6 HOURS PRN SEVERE PAIN 4 tablet 0    insulin aspart, niacinamide, (FIASP PENFILL U-100 INSULIN) 100 unit/mL (3 mL) Crtg pen To use with inPen; 140 max daily dose. 14 pen 11    insulin degludec (TRESIBA FLEXTOUCH U-200) 200 unit/mL (3 mL) insulin pen Inject 60 Units into the skin once daily. Cartridge for novoecho (Patient taking differently: Inject 40 Units into the skin once daily. Cartridge for novoecho) 3 pen 11    ketoconazole (NIZORAL) 2 % cream Apply to affected area DAILY 30 g 3    lamiVUDine (EPIVIR) 150 MG Tab TAKE 1 TABLET BY MOUTH EVERY DAY 30 tablet 23    metoprolol tartrate (LOPRESSOR) 50 MG tablet Take 50 mg by mouth every 12 (twelve) hours.      moxifloxacin (VIGAMOX) 0.5 % ophthalmic solution Place 1 drop into the right eye 3 (three) times daily. 3 mL 3    multivitamin (THERAGRAN) per tablet Take 1 tablet by mouth once daily.      mupirocin (BACTROBAN) 2 % ointment Apply to affected area 3 times daily 22 g 1    nitroGLYCERIN (NITROSTAT) 0.4 MG SL tablet PLACE 1 TABLET UNDER THE TONGUE EVERY 5 MINUTES AS NEEDED FOR CHEST PAIN. 100 tablet 2    nystatin (MYCOSTATIN) powder Apply topically 4 (four) times daily. Apply to intertriginous areas as directed up to four times daily to reduce moisture. for 14 days 30 g 0    olmesartan (BENICAR) 40 MG tablet TAKE 1 TABLET BY MOUTH EVERY DAY AT NIGHT 90 tablet 3    oxyCODONE-acetaminophen (PERCOCET) 5-325 mg per tablet Take 1 tablet by mouth every 6 (six) hours as needed for Pain. 12 tablet 0    prednisoLONE acetate (PRED FORTE) 1 % DrpS Place 1 drop into the right eye 3 (three)  times daily. 10 mL 3    predniSONE (DELTASONE) 2.5 MG tablet TAKE 5 TABLETS (12.5 MG TOTAL) BY MOUTH ONCE DAILY. 150 tablet 2    predniSONE (DELTASONE) 5 MG tablet TAKE 2 TABLETS BY MOUTH ONCE DAILY. 60 tablet 3    PROAIR HFA 90 mcg/actuation inhaler       tirzepatide 7.5 mg/0.5 mL PnIj Inject 7.5 mg into the skin every 7 days. (Patient taking differently: Inject 7.5 mg into the skin every 7 days. THURSDAYS, LAST DOSE 4/11/24) 4 Pen 11    tiZANidine (ZANAFLEX) 4 MG tablet TAKE 1 TABLET BY MOUTH EVERY 8 HOURS AS NEEDED FOR PAIN 30 tablet 2    tocilizumab 162 mg/0.9 mL PnIj Inject 162 mg into the skin once a week. 4 mL 0    tocilizumab 162 mg/0.9 mL PnIj Inject 162 mg into the skin once a week. 4 mL 6    walker Misc 1 Units by Misc.(Non-Drug; Combo Route) route daily as needed (Rollator Walker). 1 each 0     Scheduled Medications:  Current Facility-Administered Medications   Medication Dose Route Frequency    acetaminophen  1,000 mg Oral Q8H    digoxin  0.125 mg Oral Daily    doxycycline  100 mg Oral Q12H    enoxparin  40 mg Subcutaneous Daily    EScitalopram oxalate  20 mg Oral QHS    gabapentin  400 mg Oral TID    Lactobacillus rhamnosus GG  1 capsule Oral Daily    LIDOcaine  1 patch Transdermal Q24H    magnesium sulfate IVPB  2 g Intravenous Once    polyethylene glycol  17 g Oral BID     PRN:   Current Facility-Administered Medications:     dextrose 10%, 12.5 g, Intravenous, PRN    dextrose 10%, 25 g, Intravenous, PRN    glucagon (human recombinant), 1 mg, Intramuscular, PRN    glucose, 16 g, Oral, PRN    glucose, 24 g, Oral, PRN    insulin aspart U-100, 0-5 Units, Subcutaneous, QID (AC + HS) PRN    naloxone, 0.02 mg, Intravenous, PRN    ondansetron, 4 mg, Intravenous, Q8H PRN    oxyCODONE, 5 mg, Oral, Q4H PRN    oxyCODONE, 10 mg, Oral, Q4H PRN    sodium chloride 0.9%, 10 mL, Intravenous, Q12H PRN  Infusions:   Current Facility-Administered Medications   Medication Dose Route Frequency Last Rate Last Admin      Estimated Creatinine Clearance: 114.2 mL/min (based on SCr of 0.7 mg/dL).             Assessment/Plan:      * Intractable pain  -Due to new L1 fracture   -Hydromorphone 1 mg IV q4h prn for severe pain  -Oxycodone 10 mg q4h prn for moderate pain   -Per spine surgery  MRI L sp 4/27 (partially completed): acute L1 compression fx with mild retropulsion. No severe stenosis. post kyphopasty at T11, L3   -Xrays pending now TSLO brace  -Repeat MRI w/ contrast w/ sedation neg for path fracture;  -chest CT neg for any definitive malignancy ; has lung nodules - pt rec'd outpt f/u.  -will await therapy session w/ PT to see if pt's pain is controlled or needs kyphoplasty.      5/2 awaiting PT session to see if pt can tolerate pain off dilaudid; otherwise  needs kyphoplasty. off  dilaudid and continue oxycodone 5/10mg q 4h PRN.continue tylenol 1000mg q8h, Gabapentin BID and robaxin. needs SNF        Testicular pain, left  5/6 c/o left sided testicular pain 4/10. no dysuria. 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 Repeat washout performed 11/17/22. US scrotum and testes -No definite acute testicular findings.  No evidence of torsion at the present time. Small simple appearing right-sided hydrocele. Asymmetrically increased caliber of the left epididymis with relative hypodensity and question of surrounding increased vascularity.  likely epididymitis . started on Doxycycline BID x 10 days.  scrotal support . towel under scrotum. icepacks as needed    Constipation  started on miralax BID      Nephrolithiasis  . Bilateral nonobstructive nephrolithiasis. UA negative       Enteritis  improved as above       Diarrhea of presumed infectious origin  C diff testing negative  Afebrile; no abx needed at this time  Seems resolved now  CT showing   Liquid stool throughout the large bowel and within several small bowel loops, taken together, suggests diarrheal illness  possibly in the setting of enteritis. And Left perinephric fat stranding noting somewhat delayed excretion of contrast by the left kidney.     Imodium prn   probiotic  CRP is downtrending      Lumbar vertebral fracture    5/2  s/p T11 and L3 kyphoplasty with pain management Dr. Aviles on 4/22/2024, now with worsening back pain and diarrhea. Has had multiple ED visits in the past week, CT A/P and MRI show new acute compression fx of L1 with mild retropulsion. In significant pain but remains neuro intact. MRI L sp 4/27 (partially completed): acute L1 compression fx with mild retropulsion. No severe stenosis. S/p kyphopasty at T11, L3. CT chest malignancy workup completed, R upper lobe opacity. Read reports malignancy less likely. s/p NSGY Eval - multimodal pain control with narcotics, tylenol/NSAIDs, muscle relaxants. TLSO brace when upright/OOB. No clear evidence of pathological fracture. consider L1 kyphoplasty for ongoing pain in the future. Patient not interested at this time. needs Optimization of bone quality due to  osteopenia on DXA scan from 2023. awaiting PT session to see if pt can tolerate pain off dilaudid; otherwise  needs kyphoplasty. off  dilaudid and continue oxycodone 5/10mg q 4h PRN.continue tylenol 1000mg q8h, Gabapentin BID and robaxin. needs SNF      Rheumatoid arthritis involving multiple sites with positive rheumatoid factor  Hx of RA on tocilizumab, last in march 2024, supposed to be on lamivudine for hep B ppx    Supraventricular tachycardia  Hx of SVT on digoxin, coreg but hasn't been taking coreg    Continue digoxin for now   Will give IV magnesium 2gm  Optimize potassium > 4    Coronary artery disease  Hx of CAD  Hold ASA for now     Type 2 diabetes mellitus with hyperglycemia, with long-term current use of insulin  Hasn't been taking insulin  Will hold off on scheduled insulin  Monitor glucose AC/HS and utilize prn insulin     Patient's FSGs are controlled on current hypoglycemics.   Last  A1c reviewed-   Lab Results   Component Value Date    HGBA1C 7.4 (H) 03/01/2024    HGBA1C 7.0 (H) 12/01/2023    HGBA1C 7.1 (H) 09/29/2023     Will hold PO hypoglycemics and will start correctional scale insulin  Most recent fingerstick glucose reviewed-   Recent Labs   Lab 05/05/24  0845 05/05/24  1520 05/05/24  2102 05/06/24  0738   POCTGLUCOSE 176* 185* 162* 170*     currently on   Antihyperglycemics (From admission, onward)      Start     Stop Route Frequency Ordered    04/27/24 0133  insulin aspart U-100 pen 0-5 Units         -- SubQ Before meals & nightly PRN 04/27/24 0133             Chronic diastolic heart failure  Hasn't been on lasix   Currently hypovolemic due to diarrhea, s/p 1 L of IVF in the ED  Will encourage PO hydration and utilize IV fluids if needed     Echo    Interpretation Summary    Left Ventricle: The left ventricle is normal in size. Increased wall thickness. There is concentric remodeling. There is normal systolic function with a visually estimated ejection fraction of 55 - 60%. Grade I diastolic dysfunction.    Right Ventricle: Normal right ventricular cavity size. Wall thickness is normal. Right ventricle wall motion  is normal. Systolic function is normal.         Hepatitis B core antibody positive  Patient is supposed to be on ppx with lamivudine but hasn't been taking it for few weeks  Will hold off resuming it for now, needs to be resumed at discharge         VTE Risk Mitigation (From admission, onward)           Ordered     enoxaparin injection 40 mg  Daily         04/27/24 0133     IP VTE HIGH RISK PATIENT  Once         04/27/24 0133     Place sequential compression device  Until discontinued         04/27/24 0133                    Discharge Planning   ALEXANDER: 5/7/2024     Code Status: Full Code   Is the patient medically ready for discharge?: (No Documentation)    Reason for patient still in hospital (select all that apply): Treatment  Discharge Plan A: Skilled Nursing Facility                   Sung Scherer MD  Department of Hospital Medicine   [facility] - Neurosurgery ([facility])

## 2024-05-06 NOTE — NURSING
.Nurses Note -- 4 Eyes      5/5/2024   11:26 PM      Skin assessed during: Q Shift Change      [] No Altered Skin Integrity Present    [x]Prevention Measures Documented      [] Yes- Altered Skin Integrity Present or Discovered   [] LDA Added if Not in Epic (Describe Wound)   [] New Altered Skin Integrity was Present on Admit and Documented in LDA   [] Wound Image Taken    Wound Care Consulted? No    Attending Nurse:  Janel Owens RN/Staff Member:  [place]

## 2024-05-06 NOTE — PLAN OF CARE
Problem: Adult Inpatient Plan of Care  Goal: Plan of Care Review  Outcome: Progressing  Goal: Optimal Comfort and Wellbeing  Outcome: Progressing     Problem: Wound  Goal: Optimal Functional Ability  Outcome: Progressing  Goal: Optimal Pain Control and Function  Outcome: Progressing     Problem: Fall Injury Risk  Goal: Absence of Fall and Fall-Related Injury  Outcome: Progressing

## 2024-05-07 LAB
ALBUMIN SERPL BCP-MCNC: 1.8 G/DL (ref 3.5–5.2)
ALP SERPL-CCNC: 136 U/L (ref 55–135)
ALT SERPL W/O P-5'-P-CCNC: 11 U/L (ref 10–44)
ANION GAP SERPL CALC-SCNC: 9 MMOL/L (ref 8–16)
AST SERPL-CCNC: 20 U/L (ref 10–40)
BASOPHILS # BLD AUTO: 0.08 K/UL (ref 0–0.2)
BASOPHILS NFR BLD: 1.1 % (ref 0–1.9)
BILIRUB SERPL-MCNC: 0.2 MG/DL (ref 0.1–1)
BUN SERPL-MCNC: 10 MG/DL (ref 8–23)
CALCIUM SERPL-MCNC: 7.9 MG/DL (ref 8.7–10.5)
CHLORIDE SERPL-SCNC: 99 MMOL/L (ref 95–110)
CO2 SERPL-SCNC: 26 MMOL/L (ref 23–29)
CREAT SERPL-MCNC: 0.7 MG/DL (ref 0.5–1.4)
DIFFERENTIAL METHOD BLD: ABNORMAL
EOSINOPHIL # BLD AUTO: 0.1 K/UL (ref 0–0.5)
EOSINOPHIL NFR BLD: 1.8 % (ref 0–8)
ERYTHROCYTE [DISTWIDTH] IN BLOOD BY AUTOMATED COUNT: 12.2 % (ref 11.5–14.5)
EST. GFR  (NO RACE VARIABLE): >60 ML/MIN/1.73 M^2
GLUCOSE SERPL-MCNC: 144 MG/DL (ref 70–110)
HCT VFR BLD AUTO: 43.6 % (ref 40–54)
HGB BLD-MCNC: 14.5 G/DL (ref 14–18)
IMM GRANULOCYTES # BLD AUTO: 0.16 K/UL (ref 0–0.04)
IMM GRANULOCYTES NFR BLD AUTO: 2.2 % (ref 0–0.5)
LYMPHOCYTES # BLD AUTO: 1.7 K/UL (ref 1–4.8)
LYMPHOCYTES NFR BLD: 23.3 % (ref 18–48)
MAGNESIUM SERPL-MCNC: 1.9 MG/DL (ref 1.6–2.6)
MCH RBC QN AUTO: 33.3 PG (ref 27–31)
MCHC RBC AUTO-ENTMCNC: 33.3 G/DL (ref 32–36)
MCV RBC AUTO: 100 FL (ref 82–98)
MONOCYTES # BLD AUTO: 1.3 K/UL (ref 0.3–1)
MONOCYTES NFR BLD: 17.4 % (ref 4–15)
NEUTROPHILS # BLD AUTO: 3.9 K/UL (ref 1.8–7.7)
NEUTROPHILS NFR BLD: 54.2 % (ref 38–73)
NRBC BLD-RTO: 0 /100 WBC
PLATELET # BLD AUTO: 255 K/UL (ref 150–450)
PMV BLD AUTO: 11.8 FL (ref 9.2–12.9)
POCT GLUCOSE: 132 MG/DL (ref 70–110)
POCT GLUCOSE: 136 MG/DL (ref 70–110)
POCT GLUCOSE: 160 MG/DL (ref 70–110)
POCT GLUCOSE: 161 MG/DL (ref 70–110)
POTASSIUM SERPL-SCNC: 4 MMOL/L (ref 3.5–5.1)
PROT SERPL-MCNC: 4.9 G/DL (ref 6–8.4)
RBC # BLD AUTO: 4.35 M/UL (ref 4.6–6.2)
SODIUM SERPL-SCNC: 134 MMOL/L (ref 136–145)
WBC # BLD AUTO: 7.17 K/UL (ref 3.9–12.7)

## 2024-05-07 PROCEDURE — 25000003 PHARM REV CODE 250

## 2024-05-07 PROCEDURE — 97530 THERAPEUTIC ACTIVITIES: CPT | Mod: CO

## 2024-05-07 PROCEDURE — 25000003 PHARM REV CODE 250: Performed by: HOSPITALIST

## 2024-05-07 PROCEDURE — 25000003 PHARM REV CODE 250: Performed by: INTERNAL MEDICINE

## 2024-05-07 PROCEDURE — 85025 COMPLETE CBC W/AUTO DIFF WBC: CPT | Performed by: HOSPITALIST

## 2024-05-07 PROCEDURE — 36415 COLL VENOUS BLD VENIPUNCTURE: CPT | Performed by: HOSPITALIST

## 2024-05-07 PROCEDURE — 80053 COMPREHEN METABOLIC PANEL: CPT | Performed by: HOSPITALIST

## 2024-05-07 PROCEDURE — 97535 SELF CARE MNGMENT TRAINING: CPT | Mod: CO

## 2024-05-07 PROCEDURE — 83735 ASSAY OF MAGNESIUM: CPT | Performed by: HOSPITALIST

## 2024-05-07 PROCEDURE — 63600175 PHARM REV CODE 636 W HCPCS: Performed by: INTERNAL MEDICINE

## 2024-05-07 PROCEDURE — 11000001 HC ACUTE MED/SURG PRIVATE ROOM

## 2024-05-07 RX ORDER — DOXYCYCLINE HYCLATE 100 MG
100 TABLET ORAL EVERY 12 HOURS
Qty: 20 TABLET | Refills: 0 | Status: SHIPPED | OUTPATIENT
Start: 2024-05-07 | End: 2024-05-17

## 2024-05-07 RX ORDER — POLYETHYLENE GLYCOL 3350 17 G/17G
17 POWDER, FOR SOLUTION ORAL DAILY
Qty: 510 G | Refills: 2 | Status: SHIPPED | OUTPATIENT
Start: 2024-05-07

## 2024-05-07 RX ORDER — OXYCODONE HYDROCHLORIDE 10 MG/1
10 TABLET ORAL EVERY 4 HOURS PRN
Qty: 42 TABLET | Refills: 0 | Status: SHIPPED | OUTPATIENT
Start: 2024-05-07 | End: 2024-05-08

## 2024-05-07 RX ORDER — GABAPENTIN 400 MG/1
400 CAPSULE ORAL 3 TIMES DAILY
Qty: 90 CAPSULE | Refills: 11 | Status: SHIPPED | OUTPATIENT
Start: 2024-05-07 | End: 2025-05-07

## 2024-05-07 RX ORDER — LIDOCAINE 50 MG/G
1 PATCH TOPICAL DAILY
Qty: 30 PATCH | Refills: 2 | Status: SHIPPED | OUTPATIENT
Start: 2024-05-07

## 2024-05-07 RX ORDER — ACETAMINOPHEN 500 MG
1000 TABLET ORAL EVERY 8 HOURS
Qty: 60 TABLET | Refills: 0 | Status: SHIPPED | OUTPATIENT
Start: 2024-05-07 | End: 2024-05-17

## 2024-05-07 RX ADMIN — OXYCODONE HYDROCHLORIDE 10 MG: 10 TABLET ORAL at 02:05

## 2024-05-07 RX ADMIN — ACETAMINOPHEN 1000 MG: 500 TABLET ORAL at 05:05

## 2024-05-07 RX ADMIN — OXYCODONE HYDROCHLORIDE 10 MG: 10 TABLET ORAL at 09:05

## 2024-05-07 RX ADMIN — ENOXAPARIN SODIUM 40 MG: 40 INJECTION SUBCUTANEOUS at 05:05

## 2024-05-07 RX ADMIN — OXYCODONE HYDROCHLORIDE 10 MG: 10 TABLET ORAL at 05:05

## 2024-05-07 RX ADMIN — Medication 1 CAPSULE: at 09:05

## 2024-05-07 RX ADMIN — DIGOXIN 0.12 MG: 125 TABLET ORAL at 09:05

## 2024-05-07 RX ADMIN — GABAPENTIN 400 MG: 400 CAPSULE ORAL at 08:05

## 2024-05-07 RX ADMIN — DOXYCYCLINE HYCLATE 100 MG: 100 TABLET, COATED ORAL at 08:05

## 2024-05-07 RX ADMIN — OXYCODONE HYDROCHLORIDE 10 MG: 10 TABLET ORAL at 08:05

## 2024-05-07 RX ADMIN — ACETAMINOPHEN 1000 MG: 500 TABLET ORAL at 09:05

## 2024-05-07 RX ADMIN — ESCITALOPRAM OXALATE 20 MG: 20 TABLET, FILM COATED ORAL at 08:05

## 2024-05-07 RX ADMIN — ACETAMINOPHEN 1000 MG: 500 TABLET ORAL at 02:05

## 2024-05-07 RX ADMIN — GABAPENTIN 400 MG: 400 CAPSULE ORAL at 02:05

## 2024-05-07 RX ADMIN — DOXYCYCLINE HYCLATE 100 MG: 100 TABLET, COATED ORAL at 09:05

## 2024-05-07 RX ADMIN — GABAPENTIN 400 MG: 400 CAPSULE ORAL at 09:05

## 2024-05-07 NOTE — PT/OT/SLP PROGRESS
"Occupational Therapy   Treatment    Name: Nithin Wagner  MRN: [id number]  Admitting Diagnosis:  Intractable pain  7 Days Post-Op  Procedure(s):  MRI (Magnetic Resonance Imagine)     Recommendations:     Discharge Recommendations: Moderate Intensity Therapy  Discharge Equipment Recommendations:  walker, rolling  Barriers to discharge:  Decreased caregiver support    Assessment:     Nithin Wagner is a 68 y.o. male with a medical diagnosis of Intractable pain.  He presents with the following performance deficits affecting function are impaired endurance, weakness, impaired self care skills, impaired functional mobility, gait instability, impaired balance, pain, decreased lower extremity function, decreased ROM, orthopedic precautions. Patient progressing well with OT intervention. Patient is limited by c/o pain but is motivated to increase occupational performance. Patient continues to demonstrate the need for moderate intensity therapy on a daily basis post acute exhibited by decreased independence with self-care and functional mobility    Rehab Prognosis:  Good; patient would benefit from acute skilled OT services to address these deficits and reach maximum level of function.       Plan:     Patient to be seen 3 x/week to address the above listed problems via self-care/home management, therapeutic activities, therapeutic exercises, neuromuscular re-education  Plan of Care Expires:    Plan of Care Reviewed with: patient    Subjective     Chief Complaint: "It's not my back. It's my arthritis right now. My knee and my shoulder."  Patient/Family Comments/goals: patient agreeable to therapy  Pain/Comfort:  Pain Rating 1:  (unrated)  Location - Orientation 1: generalized  Location 1: knee  Pain Addressed 1: Distraction  Pain Rating Post-Intervention 1:  (unrated)    Objective:     Communicated with: nurse elvie prior to session.  Patient found right sidelying with  (no active lines) upon OT entry to room.    General " Precautions: Standard, fall    Orthopedic Precautions:spinal precautions  Braces: TLSO  Respiratory Status: Room air     Occupational Performance:     Bed Mobility:    Patient completed Supine to Sit with stand by assistance, with side rail, and HOB flat with increased time for mobility 2/2 pain via log roll technique  Patient completed Sit to Supine with contact guard assistance, with side rail, and HOB flat via log roll technique      Functional Mobility/Transfers:  Patient completed Sit <> Stand Transfer with contact guard assistance  with  rolling walker   Patient completed Toilet Transfer Step Transfer technique with contact guard assistance with  rolling walker and grab bars  Functional Mobility: to/from bathroom 16ft x2 using RW with SBA intermittent CGA during transitional movements navigating in tight spaces    Activities of Daily Living:  Upper Body Dressing: contact guard assistance don t-shirt and TLSO seated EOB (min cues for sequencing and donning technique of TLSO)  Lower Body Dressing: moderate assistance don B socks via supported fig 4 technique seated EOB  Toileting: minimum assistance for brief management but patient able to complete perirectal hygiene seated      AMPAC 6 Click ADL: 19    Treatment & Education:  Patient edu on OT POC, goals, and current progress. Patient was able to recall spinal precautions on first attempt. Patient deferred sitting UIC. Patient encouraged to perform progress mobility with nursing (A) of 1 person. Nurse notified patient requesting linens to be changed. Addressed all patient questions/concerns within [facility] scope of practice.     Patient left right sidelying with call button in reach and nurse notified    GOALS:   Multidisciplinary Problems       Occupational Therapy Goals          Problem: Occupational Therapy    Goal Priority Disciplines Outcome Interventions   Occupational Therapy Goal     OT, PT/OT Progressing    Description: Goals to be met by: 05-14-24      Patient will increase functional independence with ADLs by performing:    UE Dressing with Set-up Assistance.  LE Dressing with Set-up Assistance with AE  Grooming while standing at sink with Supervision.  Toileting from toilet with Supervision for hygiene and clothing management.   Rolling to Bilateral with [place] using log roll technique  Supine to sit with [place].  Stand pivot transfers with Supervision.  Toilet transfer to toilet with Supervision.  Pt. To be able to recall spinal precautions with 100% accuracy                         Time Tracking:     OT Date of Treatment: 05/07/24  OT Start Time: 1055  OT Stop Time: 1128  OT Total Time (min): 33 min    Billable Minutes:Self Care/Home Management 20  Therapeutic Activity 13    OT/MARS: MARS     Number of MARS visits since last OT visit: 2    5/7/2024

## 2024-05-07 NOTE — PLAN OF CARE
Problem: Adult Inpatient Plan of Care  Goal: Optimal Comfort and Wellbeing  Outcome: Progressing     Problem: Diabetes Comorbidity  Goal: Blood Glucose Level Within Targeted Range  Outcome: Progressing     Problem: Fall Injury Risk  Goal: Absence of Fall and Fall-Related Injury  Outcome: Progressing

## 2024-05-07 NOTE — PLAN OF CARE
Problem: Adult Inpatient Plan of Care  Goal: Plan of Care Review  Outcome: Progressing  Goal: Patient-Specific Goal (Individualized)  Description: Pt will maintain sbp below 180  Outcome: Progressing  Goal: Absence of Hospital-Acquired Illness or Injury  Outcome: Progressing  Goal: Optimal Comfort and Wellbeing  Outcome: Progressing  Goal: Readiness for Transition of Care  Outcome: Progressing     Problem: Diabetes Comorbidity  Goal: Blood Glucose Level Within Targeted Range  Outcome: Progressing     Problem: Infection  Goal: Absence of Infection Signs and Symptoms  Outcome: Progressing     Problem: Wound  Goal: Optimal Coping  Outcome: Progressing  Goal: Optimal Functional Ability  Outcome: Progressing  Goal: Absence of Infection Signs and Symptoms  Outcome: Progressing  Goal: Improved Oral Intake  Outcome: Progressing  Goal: Optimal Pain Control and Function  Outcome: Progressing  Goal: Skin Health and Integrity  Outcome: Progressing  Goal: Optimal Wound Healing  Outcome: Progressing

## 2024-05-07 NOTE — PLAN OF CARE
YOHANNES received a message from [facility] via [employer] that auth has been obtained.     1311 CM spoke with patient's nephew Anjel via phone [phone]   and he stated he is enroute to the hospital to have patient provide information to complete paperwork.  1314 CM updated facility on the status of paperwork via [employer].

## 2024-05-07 NOTE — PROGRESS NOTES
[facility] - Neurosurgery ([facility])  [facility]  Progress Note    Patient Name: Nithin Wagner  MRN: [id number]  Patient Class: IP- Inpatient   Admission Date: 4/26/2024  Length of Stay: 9 days  Attending Physician: Sung Scherer MD  Primary Care Provider: Tushar Drew MD        Subjective:     Principal Problem:Intractable pain        HPI:  Pt is a 68yoM s/p s/p T11 and L3 kyphoplasty on 4/22/2024, now with worsening back pain and diarrhea. Has had multiple ED visits in the past week, CT A/P done yesterday showed Interval compression deformity involving the superior endplate of L1 as well as enteritis, he was discharged home on cipro/flagyl but never started taking them. Was seen by pain management and recommended admission for intractable pain.     in the ED, vital signs are stable, cbc/cmp unremarkable, ESR/CRP down trending.  patient has no red flags for infection/spinal involvement beyond pain and has reassuring neurological examination.  Patient has  required 2 mg of IV hydromorphone.      At bedside, complained of low back pain, denies worsening numbness in his legs, no fecal/urinary incontinence, no fevers, no night sweats. Complained of watery diarrhea for few days, he states that few of his friends have contracted a diarrheal illness as well. Diarrhea is associated with abdominal pain, no nausea/no vomiting. Hasn't been able to eat well and hasn't taking most of his medications in the past few days.     Overview/Hospital Course:  Per NSGY L-spine MRI (partially completed) read showed   acute L1 compression fx with mild retropulsion. No severe stenosis. post kyphopasty at T11, L3. C diff was negative, started on imodium. Pt reports improvement in diarrhea; 1 loose nonbloody bm since 4 am, not as watery anymore. CT chest neg for any definitive malignancy, has nonspecific lung nodules, outpt f/u.     5/2  s/p T11 and L3 kyphoplasty with pain management Dr. Aviles on 4/22/2024, now with worsening  back pain and diarrhea. Has had multiple ED visits in the past week, CT A/P and MRI show new acute compression fx of L1 with mild retropulsion. In significant pain but remains neuro intact. MRI L sp 4/27 (partially completed): acute L1 compression fx with mild retropulsion. No severe stenosis. S/p kyphopasty at T11, L3. CT chest malignancy workup completed, R upper lobe opacity. Read reports malignancy less likely. s/p NSGY Eval - multimodal pain control with narcotics, tylenol/NSAIDs, muscle relaxants. TLSO brace when upright/OOB. No clear evidence of pathological fracture. consider L1 kyphoplasty for ongoing pain in the future. Patient not interested at this time. needs Optimization of bone quality due to  osteopenia on DXA scan from 2023. awaiting PT session to see if pt can tolerate pain off dilaudid; otherwise  needs kyphoplasty. off  dilaudid and continue oxycodone 5/10mg q 4h PRN.continue tylenol 1000mg q8h, Gabapentin BID and robaxin. X-Ray lumbar lateral supine and upright to assess stability of fracture in the brace- Compression fractures of T11, L1 and L3, status post vertebroplasty at T11 and L3 .  needs SNF. received 3 doses of IV dilaudid 0.5mg . lidocaine patch  5/3 did not receive IV dilaudid today. B12 1500s and folate levels WNL  5/4 tolerating pain with oral oxycodone 10mg q 4h prn. completed robaxin, c/o pain in the left axilla - no skin changes.   5/5 DVT sono left UE - No thrombus in central veins of the left upper extremity.  5/6 c/o left sided testicular pain 4/10. no dysuria. 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 Repeat washout performed 11/17/22. US scrotum and testes -No definite acute testicular findings.  No evidence of torsion at the present time. Small simple appearing right-sided hydrocele. Asymmetrically increased caliber of the left epididymis with relative hypodensity and question of surrounding increased  vascularity.  Correlation for signs/symptoms of epididymitis   5/7 improved scrotal pain. SNF pending auth        Review of Systems:   Pain scale:   Constitutional:  fever,  chills, headache, vision loss, hearing loss, weight loss, Generalized weakness, falls, loss of smell, loss of taste, poor appetite,  sore throat  Respiratory: cough, shortness of breath.   Cardiovascular: chest pain, dizziness, palpitations, orthopnea, swelling of feet, syncope  Gastrointestinal: nausea, vomiting, abdominal pain, diarrhea, black stool,  blood in stool, change in bowel habits, constipation  Genitourinary: hematuria, dysuria, urgency, frequency, left testicular pain  Integument/Breast: rash,  pruritis  Hematologic/Lymphatic: easy bruising, lymphadenopathy  Musculoskeletal: arthralgias , myalgias,  back pain -improved , neck pain, knee pain  Neurological: confusion, seizures, tremors, slurred speech  Behavioral/Psych:  depression, anxiety, auditory or visual hallucinations     OBJECTIVE:     Physical Exam:  Body mass index is 28.47 kg/m².    Constitutional: Appears well-developed and well-nourished.   Head: Normocephalic and atraumatic.   Neck: Normal range of motion. Neck supple.   Cardiovascular: Normal heart rate.  Regular heart rhythm.  Pulmonary/Chest: Effort normal.   Abdominal: No distension.  No tenderness  Musculoskeletal: Normal range of motion. + edema. lumbar tendenesss   Neurological: Alert and oriented to person, place, and time. able to move bilateral upper and lower extremities without limitation   Skin: Skin is warm and dry.   Psychiatric: Normal mood and affect. Behavior is normal.                  Vital Signs  Temp: 97.9 °F (36.6 °C) (05/07/24 1157)  Pulse: 105 (05/07/24 1157)  Resp: 16 (05/07/24 1157)  BP: 115/70 (05/07/24 1157)  SpO2: (Abnormal) 91 % (05/07/24 1157)     24 Hour VS Range    Temp:  [97.9 °F (36.6 °C)-99.4 °F (37.4 °C)]   Pulse:  []   Resp:  [16-19]   BP: (115-143)/(70-90)   SpO2:  [91 %-94  "%]     Intake/Output Summary (Last 24 hours) at 5/7/2024 1310  Last data filed at 5/6/2024 1538  Gross per 24 hour   Intake 222 ml   Output 600 ml   Net -378 ml         I/O This Shift:  No intake/output data recorded.    Wt Readings from Last 3 Encounters:   04/30/24 92.6 kg (204 lb 2.3 oz)   04/26/24 91.3 kg (201 lb 4.5 oz)   04/25/24 99.8 kg (220 lb)       I have personally reviewed the vitals and recorded Intake/Output     Laboratory/Diagnostic Data:    CBC/Anemia Labs: Coags:    Recent Labs   Lab 05/03/24  0404 05/03/24  1034 05/06/24  1222 05/07/24  0423   WBC 6.71  --  7.57 7.17   HGB 15.0  --  13.9* 14.5   HCT 46.9  --  42.0 43.6     --  250 255   *  --  99* 100*   RDW 12.2  --  12.3 12.2   FOLATE  --  12.4  --   --    [id number]  --  1517*  --   --     No results for input(s): "PT", "INR", "APTT" in the last 168 hours.     Chemistries: ABG:   Recent Labs   Lab 05/03/24  0404 05/06/24  1222 05/07/24  0423   * 135* 134*   K 4.2 4.0 4.0    100 99   CO2 23 28 26   BUN 7* 13 10   CREATININE 0.6 0.7 0.7   CALCIUM 8.2* 7.8* 7.9*   PROT 5.4* 4.6* 4.9*   BILITOT 0.3 0.2 0.2   ALKPHOS 108 129 136*   ALT 10 10 11   AST 18 17 20   MG 1.7 1.5* 1.9    No results for input(s): "PH", "PCO2", "PO2", "HCO3", "POCSATURATED", "BE" in the last 168 hours.     POCT Glucose: HbA1c:    Recent Labs   Lab 05/05/24  2102 05/06/24  0738 05/06/24  1135 05/06/24  1650 05/07/24  0824 05/07/24  1159   POCTGLUCOSE 162* 170* 176* 223* 160* 161*    Hemoglobin A1C   Date Value Ref Range Status   03/01/2024 7.4 (H) 4.0 - 5.6 % Final     Comment:     ADA Screening Guidelines:  5.7-6.4%  Consistent with prediabetes  >or=6.5%  Consistent with diabetes    High levels of fetal hemoglobin interfere with the HbA1C  assay. Heterozygous hemoglobin variants (HbS, HgC, etc)do  not significantly interfere with this assay.   However, presence of multiple variants may affect accuracy.     12/01/2023 7.0 (H) 4.0 - 5.6 % Final     " "Comment:     ADA Screening Guidelines:  5.7-6.4%  Consistent with prediabetes  >or=6.5%  Consistent with diabetes    High levels of fetal hemoglobin interfere with the HbA1C  assay. Heterozygous hemoglobin variants (HbS, HgC, etc)do  not significantly interfere with this assay.   However, presence of multiple variants may affect accuracy.     09/29/2023 7.1 (H) 4.0 - 5.6 % Final     Comment:     ADA Screening Guidelines:  5.7-6.4%  Consistent with prediabetes  >or=6.5%  Consistent with diabetes    High levels of fetal hemoglobin interfere with the HbA1C  assay. Heterozygous hemoglobin variants (HbS, HgC, etc)do  not significantly interfere with this assay.   However, presence of multiple variants may affect accuracy.          Cardiac Enzymes: Ejection Fractions:    No results for input(s): "CPK", "CPKMB", "MB", "TROPONINI" in the last 72 hours. EF   Date Value Ref Range Status   10/28/2022 55 % Final   12/22/2021 57 % Final          Recent Labs   Lab 05/06/24  1319   COLORU Yellow   APPEARANCEUA Clear   PHUR 5.0   SPECGRAV 1.025   PROTEINUA Negative   GLUCUA Negative   KETONESU Trace*   BILIRUBINUA Negative   OCCULTUA Negative   NITRITE Negative   LEUKOCYTESUR Negative       Procalcitonin (ng/mL)   Date Value   01/06/2023 0.03   11/24/2022 0.19   11/15/2022 48.84 (H)     Lactate (Lactic Acid) (mmol/L)   Date Value   11/15/2022 2.2   11/15/2022 4.4 (HH)   10/26/2022 1.6   10/20/2022 1.9     BNP (pg/mL)   Date Value   04/26/2024 157 (H)   04/03/2024 177 (H)   02/09/2024 176 (H)   12/09/2022 83   11/15/2022 424 (H)     CRP (mg/L)   Date Value   04/26/2024 29.4 (H)   04/24/2024 63.0 (H)   01/06/2023 19.7 (H)   10/21/2022 8.8 (H)   10/21/2022 8.8 (H)   07/21/2022 0.4   04/22/2022 0.2   07/21/2021 0.8   11/24/2020 0.4   09/22/2020 0.6     Sed Rate (mm/Hr)   Date Value   04/26/2024 41 (H)   01/06/2023 78 (H)   10/21/2022 3   10/21/2022 3   07/21/2022 2   04/22/2022 2   07/21/2021 1   11/24/2020 2   09/22/2020 1 "   08/14/2020 2     D-Dimer (mg/L FEU)   Date Value   10/26/2022 0.40   11/10/2020 0.50 (H)   09/22/2020 0.45     Ferritin (ng/mL)   Date Value   05/05/2020 134   04/07/2020 163     LD (U/L)   Date Value   11/04/2021 260     Troponin I (ng/mL)   Date Value   04/26/2024 <0.006   02/09/2024 0.020   11/16/2022 0.357 (H)   11/15/2022 0.551 (H)   11/15/2022 0.455 (H)   10/26/2022 0.035 (H)   10/20/2022 0.016   10/20/2022 0.013     CPK (U/L)   Date Value   05/30/2022 68   03/10/2021 89   11/10/2020 133   12/22/2009 579 (H)     Results for orders placed or performed in visit on 09/29/23   Vitamin D   Result Value Ref Range    Vit D, 25-Hydroxy 30 30 - 96 ng/mL   Results for orders placed or performed in visit on 10/21/22   Vitamin D   Result Value Ref Range    Vit D, 25-Hydroxy 20 (L) 30 - 96 ng/mL     *Note: Due to a large number of results and/or encounters for the requested time period, some results have not been displayed. A complete set of results can be found in Results Review.     SARS-CoV2 (COVID-19) Qualitative PCR (no units)   Date Value   01/17/2023 Not Detected   01/13/2023 Not Detected   01/10/2023 Not Detected   01/06/2023 Not Detected   01/03/2023 Not Detected   12/29/2022 Not Detected   12/21/2022 Not Detected   12/12/2022 Not Detected   08/25/2021 Not Detected   03/07/2021 Not Detected     SARS-CoV-2 RNA, Amplification, Qual (no units)   Date Value   01/27/2023 Negative   10/20/2022 Negative     POC Rapid COVID (no units)   Date Value   02/09/2024 Negative   09/21/2023 Negative   10/26/2022 Negative   08/20/2021 Negative   08/18/2021 Negative       Microbiology labs for the last week  Microbiology Results (last 7 days)       Procedure Component Value Units Date/Time    Blood Culture #2 **CANNOT BE ORDERED STAT** [[id number]] Collected: 04/26/24 1712    Order Status: Completed Specimen: Blood from Peripheral, Antecubital, Right Updated: 05/01/24 1812     Blood Culture, Routine No growth after 5 days.     Blood Culture #1 **CANNOT BE ORDERED STAT** [[id number]] Collected: 04/26/24 1712    Order Status: Completed Specimen: Blood from Peripheral, Forearm, Left Updated: 05/01/24 1812     Blood Culture, Routine No growth after 5 days.            Reviewed and noted in plan where applicable- Please see chart for full lab data.    Lines/Drains:       Peripheral IV - Single Lumen 04/28/24 1026 22 G Posterior;Proximal;Right Forearm (Active)   Site Assessment Clean;Dry;Intact;No redness;No swelling 05/02/24 0400   Extremity Assessment Distal to IV No abnormal discoloration;No redness;No swelling;No warmth 05/02/24 0400   Line Status Flushed 05/02/24 0400   Dressing Status Clean;Dry;Intact 05/02/24 0400   Dressing Intervention Integrity maintained 05/02/24 0400   Dressing Change Due 05/02/24 05/02/24 0400   Site Change Due 05/02/24 05/02/24 0400   Reason Not Rotated Not due 05/02/24 0400   Number of days: 3       Imaging      Results for orders placed during the hospital encounter of 04/03/24    Echo    Interpretation Summary    Left Ventricle: The left ventricle is normal in size. Increased wall thickness. There is concentric remodeling. There is normal systolic function with a visually estimated ejection fraction of 55 - 60%. Grade I diastolic dysfunction.    Right Ventricle: Normal right ventricular cavity size. Wall thickness is normal. Right ventricle wall motion  is normal. Systolic function is normal.      US SCROTUM AND TESTICLES WITH DOPPLER (XPD)  Narrative: EXAMINATION:  US SCROTUM AND TESTICLES WITH DOPPLER (XPD)    CLINICAL HISTORY:  left testicle pain;    TECHNIQUE:  Sonography of the scrotum and testes.    COMPARISON:  None.    FINDINGS:  Right Testicle:    *Size: 4.9 x 2.1 x 3.4 cm  *Appearance: Normal.  *Flow: Normal arterial and venous flow  *Epididymis: Normal.  *Hydrocele: Small simple appearing hydrocele.  *Varicocele: None.  .    Left Testicle:    *Size: 5 x 2.3 x 2.5 cm  *Appearance: Normal.  *Flow:  "Normal arterial and venous flow  *Epididymis: Asymmetric thickening and relative echogenicity of the epididymis.  Question of increased vascularity peripherally.  Anechoic, cystic structure at the epididymis measures 7 x 5 x 4 mm in keeping with cysts versus spermatocele.  Additionally noted is an adjoining echogenic focus with the epididymis measuring on the order of 2 x 2 x 2 mm, possibly nonspecific calcification.  *Hydrocele: None.  *Varicocele: None.  .    Other findings: None.  Impression: 1. No definite acute testicular findings.  No evidence of torsion at the present time.  2. Small simple appearing right-sided hydrocele.  3. Asymmetrically increased caliber of the left epididymis with relative hypodensity and question of surrounding increased vascularity.  Correlation for signs/symptoms of epididymitis would be recommended.  4. Additional details, as provided in the body of report.    Electronically signed by: Mayco Peng  Date:    05/06/2024  Time:    09:50      Labs, Imaging, EKG and Diagnostic results from 5/7/2024 were reviewed.    Medications:  Medication list was reviewed and changes noted under Assessment/Plan.  No current facility-administered medications on file prior to encounter.     Current Outpatient Medications on File Prior to Encounter   Medication Sig Dispense Refill    BD ULTRA-FINE RAHUL PEN NEEDLE 32 gauge x 5/32" Ndle To use 6 daily 400 each 3    digoxin (LANOXIN) 125 mcg tablet TAKE 1 TABLET BY MOUTH ONCE DAILY. 90 tablet 3    ergocalciferol (ERGOCALCIFEROL) 50,000 unit Cap Take 1 capsule (50,000 Units total) by mouth every 7 days. 12 capsule 3    EScitalopram oxalate (LEXAPRO) 20 MG tablet Take 20 mg by mouth every evening.      folic acid (FOLVITE) 1 MG tablet Take 1 tablet (1 mg total) by mouth once daily. 30 tablet 5    ketoconazole (NIZORAL) 2 % cream Apply to affected area DAILY 30 g 3    lamiVUDine (EPIVIR) 150 MG Tab TAKE 1 TABLET BY MOUTH EVERY DAY 30 tablet 23    " multivitamin (THERAGRAN) per tablet Take 1 tablet by mouth once daily.      nitroGLYCERIN (NITROSTAT) 0.4 MG SL tablet PLACE 1 TABLET UNDER THE TONGUE EVERY 5 MINUTES AS NEEDED FOR CHEST PAIN. 100 tablet 2    nystatin (MYCOSTATIN) powder Apply topically 4 (four) times daily. Apply to intertriginous areas as directed up to four times daily to reduce moisture. for 14 days 30 g 0    PROAIR HFA 90 mcg/actuation inhaler       tirzepatide 7.5 mg/0.5 mL PnIj Inject 7.5 mg into the skin every 7 days. 4 Pen 11    tocilizumab 162 mg/0.9 mL PnIj Inject 162 mg into the skin once a week. 4 mL 0    walker Misc 1 Units by Misc.(Non-Drug; Combo Route) route daily as needed (Rollator Walker). 1 each 0    [DISCONTINUED] aspirin 81 MG Chew Take 81 mg by mouth.      [DISCONTINUED] carvediloL (COREG) 6.25 MG tablet Take 1 tablet (6.25 mg total) by mouth 2 (two) times daily with meals. 180 tablet 3    [DISCONTINUED] ciprofloxacin HCl (CIPRO) 500 MG tablet Take 1 tablet (500 mg total) by mouth 2 (two) times daily. for 7 days 14 tablet 0    [DISCONTINUED] diltiaZEM (CARDIZEM CD) 120 MG Cp24 Take 120 mg by mouth Daily.      [DISCONTINUED] furosemide (LASIX) 80 MG tablet Take 1 tablet (80 mg total) by mouth 2 (two) times a day. 180 tablet 3    [DISCONTINUED] gabapentin (NEURONTIN) 300 MG capsule 2 (two) times daily.      [DISCONTINUED] HYDROmorphone (DILAUDID) 4 MG tablet TAKE 1½ Tablets by mouth EVERY 6 HOURS as needed for SEVERE PAIN 60 tablet 0    [DISCONTINUED] HYDROmorphone (DILAUDID) 4 MG tablet TAKE ONE TABLET BY MOUTH EVERY 6 HOURS AS NEEDED FOR SEVERE PAIN 4 tablet 0    [DISCONTINUED] HYDROmorphone (DILAUDID) 4 MG tablet TAKE ONE TAB PO Q 6 HOURS PRN SEVERE PAIN 4 tablet 0    [DISCONTINUED] insulin aspart, niacinamide, (FIASP PENFILL U-100 INSULIN) 100 unit/mL (3 mL) Crtg pen To use with inPen; 140 max daily dose. 14 pen 11    [DISCONTINUED] insulin degludec (TRESIBA FLEXTOUCH U-200) 200 unit/mL (3 mL) insulin pen Inject 60 Units  into the skin once daily. Cartridge for novoecho (Patient taking differently: Inject 40 Units into the skin once daily. Cartridge for novoecho) 3 pen 11    [DISCONTINUED] metoprolol tartrate (LOPRESSOR) 50 MG tablet Take 50 mg by mouth every 12 (twelve) hours.      [DISCONTINUED] metroNIDAZOLE (FLAGYL) 500 MG tablet Take 1 tablet (500 mg total) by mouth every 12 (twelve) hours. for 7 days 14 tablet 0    [DISCONTINUED] moxifloxacin (VIGAMOX) 0.5 % ophthalmic solution Place 1 drop into the right eye 3 (three) times daily. 3 mL 3    [DISCONTINUED] mupirocin (BACTROBAN) 2 % ointment Apply to affected area 3 times daily 22 g 1    [DISCONTINUED] olmesartan (BENICAR) 40 MG tablet TAKE 1 TABLET BY MOUTH EVERY DAY AT NIGHT 90 tablet 3    [DISCONTINUED] oxyCODONE-acetaminophen (PERCOCET)  mg per tablet Take 1 tablet by mouth every 6 (six) hours as needed for Pain. 12 tablet 0    [DISCONTINUED] oxyCODONE-acetaminophen (PERCOCET) 5-325 mg per tablet Take 1 tablet by mouth every 6 (six) hours as needed for Pain. 12 tablet 0    [DISCONTINUED] prednisoLONE acetate (PRED FORTE) 1 % DrpS Place 1 drop into the right eye 3 (three) times daily. 10 mL 3    [DISCONTINUED] predniSONE (DELTASONE) 2.5 MG tablet TAKE 5 TABLETS (12.5 MG TOTAL) BY MOUTH ONCE DAILY. 150 tablet 2    [DISCONTINUED] predniSONE (DELTASONE) 5 MG tablet TAKE 2 TABLETS BY MOUTH ONCE DAILY. 60 tablet 3    [DISCONTINUED] tiZANidine (ZANAFLEX) 4 MG tablet TAKE 1 TABLET BY MOUTH EVERY 8 HOURS AS NEEDED FOR PAIN 30 tablet 2    [DISCONTINUED] tocilizumab 162 mg/0.9 mL PnIj Inject 162 mg into the skin once a week. 4 mL 6     Scheduled Medications:  Current Facility-Administered Medications   Medication Dose Route Frequency    acetaminophen  1,000 mg Oral Q8H    digoxin  0.125 mg Oral Daily    doxycycline  100 mg Oral Q12H    enoxparin  40 mg Subcutaneous Daily    EScitalopram oxalate  20 mg Oral QHS    gabapentin  400 mg Oral TID    Lactobacillus rhamnosus GG  1  capsule Oral Daily    LIDOcaine  1 patch Transdermal Q24H    polyethylene glycol  17 g Oral BID     PRN:   Current Facility-Administered Medications:     dextrose 10%, 12.5 g, Intravenous, PRN    dextrose 10%, 25 g, Intravenous, PRN    glucagon (human recombinant), 1 mg, Intramuscular, PRN    glucose, 16 g, Oral, PRN    glucose, 24 g, Oral, PRN    insulin aspart U-100, 0-5 Units, Subcutaneous, QID (AC + HS) PRN    naloxone, 0.02 mg, Intravenous, PRN    ondansetron, 4 mg, Intravenous, Q8H PRN    oxyCODONE, 5 mg, Oral, Q4H PRN    oxyCODONE, 10 mg, Oral, Q4H PRN    sodium chloride 0.9%, 10 mL, Intravenous, Q12H PRN  Infusions:       Estimated Creatinine Clearance: 114.2 mL/min (based on SCr of 0.7 mg/dL).             Assessment/Plan:      * Intractable pain  -Due to new L1 fracture   -Hydromorphone 1 mg IV q4h prn for severe pain  -Oxycodone 10 mg q4h prn for moderate pain   -Per spine surgery  MRI L sp 4/27 (partially completed): acute L1 compression fx with mild retropulsion. No severe stenosis. post kyphopasty at T11, L3   -Xrays pending now TSLO brace  -Repeat MRI w/ contrast w/ sedation neg for path fracture;  -chest CT neg for any definitive malignancy ; has lung nodules - pt rec'd outpt f/u.  -will await therapy session w/ PT to see if pt's pain is controlled or needs kyphoplasty.      5/2 awaiting PT session to see if pt can tolerate pain off dilaudid; otherwise  needs kyphoplasty. off  dilaudid and continue oxycodone 5/10mg q 4h PRN.continue tylenol 1000mg q8h, Gabapentin BID and robaxin. needs SNF        Testicular pain, left  5/6 c/o left sided testicular pain 4/10. no dysuria. 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 Repeat washout performed 11/17/22. US scrotum and testes -No definite acute testicular findings.  No evidence of torsion at the present time. Small simple appearing right-sided hydrocele. Asymmetrically increased caliber of the  left epididymis with relative hypodensity and question of surrounding increased vascularity.  likely epididymitis . started on Doxycycline BID x 10 days.  scrotal support . towel under scrotum. icepacks as needed    Constipation  started on miralax BID      Nephrolithiasis  . Bilateral nonobstructive nephrolithiasis. UA negative       Enteritis  improved as above       Diarrhea of presumed infectious origin  C diff testing negative  Afebrile; no abx needed at this time  Seems resolved now  CT showing   Liquid stool throughout the large bowel and within several small bowel loops, taken together, suggests diarrheal illness possibly in the setting of enteritis. And Left perinephric fat stranding noting somewhat delayed excretion of contrast by the left kidney.     Imodium prn   probiotic  CRP is downtrending      Lumbar vertebral fracture    5/2  s/p T11 and L3 kyphoplasty with pain management Dr. Aviles on 4/22/2024, now with worsening back pain and diarrhea. Has had multiple ED visits in the past week, CT A/P and MRI show new acute compression fx of L1 with mild retropulsion. In significant pain but remains neuro intact. MRI L sp 4/27 (partially completed): acute L1 compression fx with mild retropulsion. No severe stenosis. S/p kyphopasty at T11, L3. CT chest malignancy workup completed, R upper lobe opacity. Read reports malignancy less likely. s/p NSGY Eval - multimodal pain control with narcotics, tylenol/NSAIDs, muscle relaxants. TLSO brace when upright/OOB. No clear evidence of pathological fracture. consider L1 kyphoplasty for ongoing pain in the future. Patient not interested at this time. needs Optimization of bone quality due to  osteopenia on DXA scan from 2023. awaiting PT session to see if pt can tolerate pain off dilaudid; otherwise  needs kyphoplasty. off  dilaudid and continue oxycodone 5/10mg q 4h PRN.continue tylenol 1000mg q8h, Gabapentin BID and robaxin. needs SNF      Rheumatoid arthritis  involving multiple sites with positive rheumatoid factor  Hx of RA on tocilizumab, last in march 2024, supposed to be on lamivudine for hep B ppx    Supraventricular tachycardia  Hx of SVT on digoxin, coreg but hasn't been taking coreg    Continue digoxin for now   Will give IV magnesium 2gm  Optimize potassium > 4    Coronary artery disease  Hx of CAD  Hold ASA for now     Type 2 diabetes mellitus with hyperglycemia, with long-term current use of insulin  Hasn't been taking insulin  Will hold off on scheduled insulin  Monitor glucose AC/HS and utilize prn insulin     Patient's FSGs are controlled on current hypoglycemics.   Last A1c reviewed-   Lab Results   Component Value Date    HGBA1C 7.4 (H) 03/01/2024    HGBA1C 7.0 (H) 12/01/2023    HGBA1C 7.1 (H) 09/29/2023     Will hold PO hypoglycemics and will start correctional scale insulin  Most recent fingerstick glucose reviewed-   Recent Labs   Lab 05/05/24  0845 05/05/24  1520 05/05/24  2102 05/06/24  0738   POCTGLUCOSE 176* 185* 162* 170*     currently on   Antihyperglycemics (From admission, onward)      Start     Stop Route Frequency Ordered    04/27/24 0133  insulin aspart U-100 pen 0-5 Units         -- SubQ Before meals & nightly PRN 04/27/24 0133             Chronic diastolic heart failure  Hasn't been on lasix   Currently hypovolemic due to diarrhea, s/p 1 L of IVF in the ED  Will encourage PO hydration and utilize IV fluids if needed     Echo    Interpretation Summary    Left Ventricle: The left ventricle is normal in size. Increased wall thickness. There is concentric remodeling. There is normal systolic function with a visually estimated ejection fraction of 55 - 60%. Grade I diastolic dysfunction.    Right Ventricle: Normal right ventricular cavity size. Wall thickness is normal. Right ventricle wall motion  is normal. Systolic function is normal.         Hepatitis B core antibody positive  Patient is supposed to be on ppx with lamivudine but hasn't been  taking it for few weeks  Will hold off resuming it for now, needs to be resumed at discharge         VTE Risk Mitigation (From admission, onward)           Ordered     enoxaparin injection 40 mg  Daily         04/27/24 0133     IP VTE HIGH RISK PATIENT  Once         04/27/24 0133     Place sequential compression device  Until discontinued         04/27/24 0133                    Discharge Planning   ALEXANDER: 5/8/2024     Code Status: Full Code   Is the patient medically ready for discharge?: (No Documentation)    Reason for patient still in hospital (select all that apply): Treatment  Discharge Plan A: Skilled Nursing Facility                  Sung Scherer MD  Department of Hospital Medicine   [facility] - Neurosurgery ([facility])

## 2024-05-07 NOTE — CARE UPDATE
I have reviewed the chart of Nithin Wagner who is hospitalized for the following:    Active Hospital Problems    Diagnosis    *Intractable pain    Testicular pain, left    Depression    Constipation    Enteritis    Nephrolithiasis    Lumbar vertebral fracture    Diarrhea of presumed infectious origin    Hyperlipidemia    Anxiety    Rheumatoid arthritis involving multiple sites with positive rheumatoid factor    Hyponatremia     Monitor with daily cmp  Replace as needed      Supraventricular tachycardia    Coronary artery disease    Tobacco use disorder    Type 2 diabetes mellitus with hyperglycemia, with long-term current use of insulin    Chronic diastolic heart failure     Grade I diastolic dysfunction.   Follow up with cardiology      Essential hypertension    Hepatitis B core antibody positive        Bertha Mixon NP  Unit Based [facility]

## 2024-05-07 NOTE — PLAN OF CARE
CHW met with patient/family at bedside. Patient experience rounding completed and reviewed the following.     Do you know your discharge plan? Yes or No,    If yes, what is the plan?  Yes SNF    Have you discussed your needs and preferences with your SW/CM? Yes     If you are discharging home, do you have help at home? Yes   Patient has help at home.    Do you think you will need help additional at home at discharge?  No   Patient has no need for additional help.    Do you currently have difficulty keeping up with bills, affording medicine or buying food? No  Patient has help with bills, food, and medicine.    Assigned SW/CM notified of any patient/family needs or concerns. Appropriate resources provided to address patient's needs.    Diane MIGUEL  Case Management  [phone]

## 2024-05-08 VITALS
RESPIRATION RATE: 18 BRPM | HEART RATE: 90 BPM | SYSTOLIC BLOOD PRESSURE: 133 MMHG | DIASTOLIC BLOOD PRESSURE: 69 MMHG | TEMPERATURE: 98 F | HEIGHT: 71 IN | OXYGEN SATURATION: 95 % | WEIGHT: 204.13 LBS | BODY MASS INDEX: 28.58 KG/M2

## 2024-05-08 LAB
ALBUMIN SERPL BCP-MCNC: 1.9 G/DL (ref 3.5–5.2)
ALP SERPL-CCNC: 137 U/L (ref 55–135)
ALT SERPL W/O P-5'-P-CCNC: 14 U/L (ref 10–44)
ANION GAP SERPL CALC-SCNC: 7 MMOL/L (ref 8–16)
AST SERPL-CCNC: 36 U/L (ref 10–40)
BILIRUB SERPL-MCNC: 0.2 MG/DL (ref 0.1–1)
BUN SERPL-MCNC: 9 MG/DL (ref 8–23)
CALCIUM SERPL-MCNC: 8.2 MG/DL (ref 8.7–10.5)
CHLORIDE SERPL-SCNC: 98 MMOL/L (ref 95–110)
CO2 SERPL-SCNC: 28 MMOL/L (ref 23–29)
CREAT SERPL-MCNC: 0.7 MG/DL (ref 0.5–1.4)
EST. GFR  (NO RACE VARIABLE): >60 ML/MIN/1.73 M^2
GLUCOSE SERPL-MCNC: 134 MG/DL (ref 70–110)
MAGNESIUM SERPL-MCNC: 1.6 MG/DL (ref 1.6–2.6)
POCT GLUCOSE: 127 MG/DL (ref 70–110)
POCT GLUCOSE: 139 MG/DL (ref 70–110)
POCT GLUCOSE: 146 MG/DL (ref 70–110)
POTASSIUM SERPL-SCNC: 3.9 MMOL/L (ref 3.5–5.1)
PROT SERPL-MCNC: 5 G/DL (ref 6–8.4)
SODIUM SERPL-SCNC: 133 MMOL/L (ref 136–145)

## 2024-05-08 PROCEDURE — 25000003 PHARM REV CODE 250: Performed by: HOSPITALIST

## 2024-05-08 PROCEDURE — 25000003 PHARM REV CODE 250

## 2024-05-08 PROCEDURE — 83735 ASSAY OF MAGNESIUM: CPT | Performed by: HOSPITALIST

## 2024-05-08 PROCEDURE — 36415 COLL VENOUS BLD VENIPUNCTURE: CPT | Performed by: HOSPITALIST

## 2024-05-08 PROCEDURE — 25000003 PHARM REV CODE 250: Performed by: INTERNAL MEDICINE

## 2024-05-08 PROCEDURE — 80053 COMPREHEN METABOLIC PANEL: CPT | Performed by: HOSPITALIST

## 2024-05-08 PROCEDURE — 63600175 PHARM REV CODE 636 W HCPCS: Performed by: HOSPITALIST

## 2024-05-08 RX ORDER — OXYCODONE HYDROCHLORIDE 10 MG/1
10 TABLET ORAL EVERY 4 HOURS PRN
Qty: 42 TABLET | Refills: 0 | Status: SHIPPED | OUTPATIENT
Start: 2024-05-08 | End: 2024-05-15

## 2024-05-08 RX ORDER — OXYCODONE HYDROCHLORIDE 10 MG/1
10 TABLET ORAL EVERY 4 HOURS PRN
Qty: 42 TABLET | Refills: 0 | Status: SHIPPED | OUTPATIENT
Start: 2024-05-08 | End: 2024-05-08

## 2024-05-08 RX ORDER — MAGNESIUM SULFATE HEPTAHYDRATE 40 MG/ML
2 INJECTION, SOLUTION INTRAVENOUS ONCE
Status: COMPLETED | OUTPATIENT
Start: 2024-05-08 | End: 2024-05-08

## 2024-05-08 RX ORDER — ASPIRIN 81 MG/1
81 TABLET ORAL DAILY
Qty: 30 TABLET | Refills: 2 | Status: SHIPPED | OUTPATIENT
Start: 2024-05-08 | End: 2025-05-08

## 2024-05-08 RX ADMIN — OXYCODONE 5 MG: 5 TABLET ORAL at 07:05

## 2024-05-08 RX ADMIN — GABAPENTIN 400 MG: 400 CAPSULE ORAL at 08:05

## 2024-05-08 RX ADMIN — OXYCODONE HYDROCHLORIDE 10 MG: 10 TABLET ORAL at 02:05

## 2024-05-08 RX ADMIN — DIGOXIN 0.12 MG: 125 TABLET ORAL at 08:05

## 2024-05-08 RX ADMIN — DOXYCYCLINE HYCLATE 100 MG: 100 TABLET, COATED ORAL at 08:05

## 2024-05-08 RX ADMIN — ACETAMINOPHEN 1000 MG: 500 TABLET ORAL at 06:05

## 2024-05-08 RX ADMIN — OXYCODONE HYDROCHLORIDE 10 MG: 10 TABLET ORAL at 10:05

## 2024-05-08 RX ADMIN — MAGNESIUM SULFATE HEPTAHYDRATE 2 G: 40 INJECTION, SOLUTION INTRAVENOUS at 08:05

## 2024-05-08 RX ADMIN — Medication 1 CAPSULE: at 08:05

## 2024-05-08 RX ADMIN — OXYCODONE HYDROCHLORIDE 10 MG: 10 TABLET ORAL at 04:05

## 2024-05-08 RX ADMIN — OXYCODONE 5 MG: 5 TABLET ORAL at 12:05

## 2024-05-08 NOTE — PLAN OF CARE
CM sent over 142 and chest xray requested by Bishop Maxwell in [facility].    900 CM sent a secure chat to Dr. Scherer requesting SNF POC orders. Awaiting MD to place orders.     1111 CM sent updated SNF orders and PASRR to facility. Awaiting review of orders and number to call report.     1212 Spoke with Nicole in Admission at [facility] she is currently reviewing orders and will give CM a call back.     7879 CM called Nicole Alas [phone]   was unable to reach her regarding information for report and transportation needs. Also, CM sent a message in [facility].     1405 CM sent over hard script for roxicodone in [facility] to [facility] per facilities request.     3676 Nicole from [facility] stated pharmacy is running the cost of patient's medication

## 2024-05-08 NOTE — PLAN OF CARE
[facility] - Neurosurgery (Hospital)  Discharge Final Note    Primary Care Provider: Tushar Drew MD    Expected Discharge Date: 5/8/2024    Final Discharge Note (most recent)       Final Note - 05/08/24 1532          Final Note    Assessment Type Final Discharge Note     Anticipated Discharge Disposition Skilled Nursing Facility     What phone number can be called within the next 1-3 days to see how you are doing after discharge? [phone] (P)      Hospital Resources/Appts/Education Provided Provided patient/caregiver with written discharge plan information;Provided education on problems/symptoms using teachback;Appointments scheduled and added to AVS (P)                      Important Message from Medicare  Important Message from Medicare regarding Discharge Appeal Rights: Given to patient/caregiver, Explained to patient/caregiver, Signed/date by patient/caregiver     Date IMM was signed: 05/07/24  Time IMM was signed: 0917      Patient will discharge to [facility]. Attending nurse given information to call report. Transport arranged via WC van set up for 1600. Patient notified of discharge to facility and verbalized understanding.

## 2024-05-08 NOTE — PLAN OF CARE
Problem: Adult Inpatient Plan of Care  Goal: Plan of Care Review  Outcome: Progressing  Goal: Optimal Comfort and Wellbeing  Outcome: Progressing     Problem: Wound  Goal: Optimal Coping  Outcome: Progressing  Goal: Optimal Pain Control and Function  Outcome: Progressing

## 2024-05-08 NOTE — PLAN OF CARE
Problem: Adult Inpatient Plan of Care  Goal: Plan of Care Review  Outcome: Met  Goal: Patient-Specific Goal (Individualized)  Description: Pt will maintain sbp below 180  Outcome: Met  Goal: Absence of Hospital-Acquired Illness or Injury  Outcome: Met  Goal: Optimal Comfort and Wellbeing  Outcome: Met  Goal: Readiness for Transition of Care  Outcome: Met     Problem: Diabetes Comorbidity  Goal: Blood Glucose Level Within Targeted Range  Outcome: Met     Problem: Infection  Goal: Absence of Infection Signs and Symptoms  Outcome: Met     Problem: Wound  Goal: Optimal Coping  Outcome: Met  Goal: Optimal Functional Ability  Outcome: Met  Goal: Absence of Infection Signs and Symptoms  Outcome: Met  Goal: Improved Oral Intake  Outcome: Met  Goal: Optimal Pain Control and Function  Outcome: Met  Goal: Skin Health and Integrity  Outcome: Met  Goal: Optimal Wound Healing  Outcome: Met     Problem: Fall Injury Risk  Goal: Absence of Fall and Fall-Related Injury  Outcome: Met

## 2024-05-08 NOTE — PT/OT/SLP DISCHARGE
Physical Therapy Discharge Summary    Name: Nithin Wagner  MRN: [id number]   Principal Problem: Intractable pain     Patient Discharged from acute Physical Therapy on 2024 .  Please refer to prior PT notes for functional status.     Assessment:     Patient appropriate for care in another setting.    Objective:     GOALS:   Multidisciplinary Problems       Physical Therapy Goals          Problem: Physical Therapy    Goal Priority Disciplines Outcome Goal Variances Interventions   Physical Therapy Goal     PT, PT/OT Progressing     Description: Goals to be met by: 24     Patient will increase functional independence with mobility by performin. Sit to stand transfer with Esmeralda  2. Bed to chair transfer with Esmeralda using Rolling Walker  3. Gait  x 150 feet with Supervision using Rolling Walker.   4. Ascend/descend 12 stair with bilateral Handrails Supervision using Single-point Cane .   5. Stand for 10 minutes with Supervision using Rolling Walker  6. Increased functional strength to WNL for ambulation and stairs to improve ambulation quality and decrease LLE buckling.                         Reasons for Discontinuation of Therapy Services  Transfer to alternate level of care.      Plan:     Patient Discharged to: Skilled Nursing Facility.to [facility] w/  scheduled for 1600 per chart      2024

## 2024-05-08 NOTE — PROGRESS NOTES
[facility] - Neurosurgery ([facility])  [facility] Medicine  Progress Note    Patient Name: Nithin Wagner  MRN: [id number]  Patient Class: IP- Inpatient   Admission Date: 4/26/2024  Length of Stay: 10 days  Attending Physician: Sung Scherer MD  Primary Care Provider: Tushar Drew MD        Subjective:     Principal Problem:Intractable pain        HPI:  Pt is a 68yoM s/p s/p T11 and L3 kyphoplasty on 4/22/2024, now with worsening back pain and diarrhea. Has had multiple ED visits in the past week, CT A/P done yesterday showed Interval compression deformity involving the superior endplate of L1 as well as enteritis, he was discharged home on cipro/flagyl but never started taking them. Was seen by pain management and recommended admission for intractable pain.     in the ED, vital signs are stable, cbc/cmp unremarkable, ESR/CRP down trending.  patient has no red flags for infection/spinal involvement beyond pain and has reassuring neurological examination.  Patient has  required 2 mg of IV hydromorphone.      At bedside, complained of low back pain, denies worsening numbness in his legs, no fecal/urinary incontinence, no fevers, no night sweats. Complained of watery diarrhea for few days, he states that few of his friends have contracted a diarrheal illness as well. Diarrhea is associated with abdominal pain, no nausea/no vomiting. Hasn't been able to eat well and hasn't taking most of his medications in the past few days.     Overview/Hospital Course:  Per NSGY L-spine MRI (partially completed) read showed   acute L1 compression fx with mild retropulsion. No severe stenosis. post kyphopasty at T11, L3. C diff was negative, started on imodium. Pt reports improvement in diarrhea; 1 loose nonbloody bm since 4 am, not as watery anymore. CT chest neg for any definitive malignancy, has nonspecific lung nodules, outpt f/u.     5/2  s/p T11 and L3 kyphoplasty with pain management Dr. Aviles on 4/22/2024, now with  worsening back pain and diarrhea. Has had multiple ED visits in the past week, CT A/P and MRI show new acute compression fx of L1 with mild retropulsion. In significant pain but remains neuro intact. MRI L sp 4/27 (partially completed): acute L1 compression fx with mild retropulsion. No severe stenosis. S/p kyphopasty at T11, L3. CT chest malignancy workup completed, R upper lobe opacity. Read reports malignancy less likely. s/p NSGY Eval - multimodal pain control with narcotics, tylenol/NSAIDs, muscle relaxants. TLSO brace when upright/OOB. No clear evidence of pathological fracture. consider L1 kyphoplasty for ongoing pain in the future. Patient not interested at this time. needs Optimization of bone quality due to  osteopenia on DXA scan from 2023. awaiting PT session to see if pt can tolerate pain off dilaudid; otherwise  needs kyphoplasty. off  dilaudid and continue oxycodone 5/10mg q 4h PRN.continue tylenol 1000mg q8h, Gabapentin BID and robaxin. X-Ray lumbar lateral supine and upright to assess stability of fracture in the brace- Compression fractures of T11, L1 and L3, status post vertebroplasty at T11 and L3 .  needs SNF. received 3 doses of IV dilaudid 0.5mg . lidocaine patch  5/3 did not receive IV dilaudid today. B12 1500s and folate levels WNL  5/4 tolerating pain with oral oxycodone 10mg q 4h prn. completed robaxin, c/o pain in the left axilla - no skin changes.   5/5 DVT sono left UE - No thrombus in central veins of the left upper extremity.  5/6 c/o left sided testicular pain 4/10. no dysuria. 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 Repeat washout performed 11/17/22. US scrotum and testes -No definite acute testicular findings.  No evidence of torsion at the present time. Small simple appearing right-sided hydrocele. Asymmetrically increased caliber of the left epididymis with relative hypodensity and question of surrounding  increased vascularity.  Correlation for signs/symptoms of epididymitis   5/7 improved scrotal pain.   5/8 Discharge to SNF today        Review of Systems:   Pain scale:   Constitutional:  fever,  chills, headache, vision loss, hearing loss, weight loss, Generalized weakness, falls, loss of smell, loss of taste, poor appetite,  sore throat  Respiratory: cough, shortness of breath.   Cardiovascular: chest pain, dizziness, palpitations, orthopnea, swelling of feet, syncope  Gastrointestinal: nausea, vomiting, abdominal pain, diarrhea, black stool,  blood in stool, change in bowel habits, constipation  Genitourinary: hematuria, dysuria, urgency, frequency, left testicular pain  Integument/Breast: rash,  pruritis  Hematologic/Lymphatic: easy bruising, lymphadenopathy  Musculoskeletal: arthralgias , myalgias,  back pain -improved , neck pain, knee pain  Neurological: confusion, seizures, tremors, slurred speech  Behavioral/Psych:  depression, anxiety, auditory or visual hallucinations     OBJECTIVE:     Physical Exam:  Body mass index is 28.47 kg/m².    Constitutional: Appears well-developed and well-nourished.   Head: Normocephalic and atraumatic.   Neck: Normal range of motion. Neck supple.   Cardiovascular: Normal heart rate.  Regular heart rhythm.  Pulmonary/Chest: Effort normal.   Abdominal: No distension.  No tenderness  Musculoskeletal: Normal range of motion. + edema. lumbar tendenesss   Neurological: Alert and oriented to person, place, and time. able to move bilateral upper and lower extremities without limitation   Skin: Skin is warm and dry.   Psychiatric: Normal mood and affect. Behavior is normal.                  Vital Signs  Temp: 98.1 °F (36.7 °C) (05/08/24 0412)  Pulse: 96 (05/08/24 0412)  Resp: 18 (05/08/24 0441)  BP: 126/66 (05/08/24 0412)  SpO2: (Abnormal) 94 % (05/08/24 0412)     24 Hour VS Range    Temp:  [97.9 °F (36.6 °C)-99.4 °F (37.4 °C)]   Pulse:  []   Resp:  [16-18]   BP:  "(115-143)/(66-90)   SpO2:  [91 %-94 %]     Intake/Output Summary (Last 24 hours) at 5/8/2024 0657  Last data filed at 5/8/2024 0503  Gross per 24 hour   Intake 8474 ml   Output 600 ml   Net 7874 ml         I/O This Shift:  I/O this shift:  In: -   Out: 600 [Urine:600]    Wt Readings from Last 3 Encounters:   04/30/24 92.6 kg (204 lb 2.3 oz)   04/26/24 91.3 kg (201 lb 4.5 oz)   04/25/24 99.8 kg (220 lb)       I have personally reviewed the vitals and recorded Intake/Output     Laboratory/Diagnostic Data:    CBC/Anemia Labs: Coags:    Recent Labs   Lab 05/03/24  0404 05/03/24  1034 05/06/24  1222 05/07/24  0423   WBC 6.71  --  7.57 7.17   HGB 15.0  --  13.9* 14.5   HCT 46.9  --  42.0 43.6     --  250 255   *  --  99* 100*   RDW 12.2  --  12.3 12.2   FOLATE  --  12.4  --   --    [id number]  --  1517*  --   --     No results for input(s): "PT", "INR", "APTT" in the last 168 hours.     Chemistries: ABG:   Recent Labs   Lab 05/06/24  1222 05/07/24  0423 05/08/24  0540   * 134* 133*   K 4.0 4.0 3.9    99 98   CO2 28 26 28   BUN 13 10 9   CREATININE 0.7 0.7 0.7   CALCIUM 7.8* 7.9* 8.2*   PROT 4.6* 4.9* 5.0*   BILITOT 0.2 0.2 0.2   ALKPHOS 129 136* 137*   ALT 10 11 14   AST 17 20 36   MG 1.5* 1.9 1.6    No results for input(s): "PH", "PCO2", "PO2", "HCO3", "POCSATURATED", "BE" in the last 168 hours.     POCT Glucose: HbA1c:    Recent Labs   Lab 05/06/24  1135 05/06/24  1650 05/07/24  0824 05/07/24  1159 05/07/24  1528 05/07/24 2057   POCTGLUCOSE 176* 223* 160* 161* 136* 132*    Hemoglobin A1C   Date Value Ref Range Status   03/01/2024 7.4 (H) 4.0 - 5.6 % Final     Comment:     ADA Screening Guidelines:  5.7-6.4%  Consistent with prediabetes  >or=6.5%  Consistent with diabetes    High levels of fetal hemoglobin interfere with the HbA1C  assay. Heterozygous hemoglobin variants (HbS, HgC, etc)do  not significantly interfere with this assay.   However, presence of multiple variants may affect " "accuracy.     12/01/2023 7.0 (H) 4.0 - 5.6 % Final     Comment:     ADA Screening Guidelines:  5.7-6.4%  Consistent with prediabetes  >or=6.5%  Consistent with diabetes    High levels of fetal hemoglobin interfere with the HbA1C  assay. Heterozygous hemoglobin variants (HbS, HgC, etc)do  not significantly interfere with this assay.   However, presence of multiple variants may affect accuracy.     09/29/2023 7.1 (H) 4.0 - 5.6 % Final     Comment:     ADA Screening Guidelines:  5.7-6.4%  Consistent with prediabetes  >or=6.5%  Consistent with diabetes    High levels of fetal hemoglobin interfere with the HbA1C  assay. Heterozygous hemoglobin variants (HbS, HgC, etc)do  not significantly interfere with this assay.   However, presence of multiple variants may affect accuracy.          Cardiac Enzymes: Ejection Fractions:    No results for input(s): "CPK", "CPKMB", "MB", "TROPONINI" in the last 72 hours. EF   Date Value Ref Range Status   10/28/2022 55 % Final   12/22/2021 57 % Final          Recent Labs   Lab 05/06/24  1319   COLORU Yellow   APPEARANCEUA Clear   PHUR 5.0   SPECGRAV 1.025   PROTEINUA Negative   GLUCUA Negative   KETONESU Trace*   BILIRUBINUA Negative   OCCULTUA Negative   NITRITE Negative   LEUKOCYTESUR Negative       Procalcitonin (ng/mL)   Date Value   01/06/2023 0.03   11/24/2022 0.19   11/15/2022 48.84 (H)     Lactate (Lactic Acid) (mmol/L)   Date Value   11/15/2022 2.2   11/15/2022 4.4 (HH)   10/26/2022 1.6   10/20/2022 1.9     BNP (pg/mL)   Date Value   04/26/2024 157 (H)   04/03/2024 177 (H)   02/09/2024 176 (H)   12/09/2022 83   11/15/2022 424 (H)     CRP (mg/L)   Date Value   04/26/2024 29.4 (H)   04/24/2024 63.0 (H)   01/06/2023 19.7 (H)   10/21/2022 8.8 (H)   10/21/2022 8.8 (H)   07/21/2022 0.4   04/22/2022 0.2   07/21/2021 0.8   11/24/2020 0.4   09/22/2020 0.6     Sed Rate (mm/Hr)   Date Value   04/26/2024 41 (H)   01/06/2023 78 (H)   10/21/2022 3   10/21/2022 3   07/21/2022 2   04/22/2022 2 "   07/21/2021 1   11/24/2020 2   09/22/2020 1   08/14/2020 2     D-Dimer (mg/L FEU)   Date Value   10/26/2022 0.40   11/10/2020 0.50 (H)   09/22/2020 0.45     Ferritin (ng/mL)   Date Value   05/05/2020 134   04/07/2020 163     LD (U/L)   Date Value   11/04/2021 260     Troponin I (ng/mL)   Date Value   04/26/2024 <0.006   02/09/2024 0.020   11/16/2022 0.357 (H)   11/15/2022 0.551 (H)   11/15/2022 0.455 (H)   10/26/2022 0.035 (H)   10/20/2022 0.016   10/20/2022 0.013     CPK (U/L)   Date Value   05/30/2022 68   03/10/2021 89   11/10/2020 133   12/22/2009 579 (H)     Results for orders placed or performed in visit on 09/29/23   Vitamin D   Result Value Ref Range    Vit D, 25-Hydroxy 30 30 - 96 ng/mL   Results for orders placed or performed in visit on 10/21/22   Vitamin D   Result Value Ref Range    Vit D, 25-Hydroxy 20 (L) 30 - 96 ng/mL     *Note: Due to a large number of results and/or encounters for the requested time period, some results have not been displayed. A complete set of results can be found in Results Review.     SARS-CoV2 (COVID-19) Qualitative PCR (no units)   Date Value   01/17/2023 Not Detected   01/13/2023 Not Detected   01/10/2023 Not Detected   01/06/2023 Not Detected   01/03/2023 Not Detected   12/29/2022 Not Detected   12/21/2022 Not Detected   12/12/2022 Not Detected   08/25/2021 Not Detected   03/07/2021 Not Detected     SARS-CoV-2 RNA, Amplification, Qual (no units)   Date Value   01/27/2023 Negative   10/20/2022 Negative     POC Rapid COVID (no units)   Date Value   02/09/2024 Negative   09/21/2023 Negative   10/26/2022 Negative   08/20/2021 Negative   08/18/2021 Negative       Microbiology labs for the last week  Microbiology Results (last 7 days)       Procedure Component Value Units Date/Time    Blood Culture #2 **CANNOT BE ORDERED STAT** [[id number]] Collected: 04/26/24 1712    Order Status: Completed Specimen: Blood from Peripheral, Antecubital, Right Updated: 05/01/24 1812     Blood  Culture, Routine No growth after 5 days.    Blood Culture #1 **CANNOT BE ORDERED STAT** [[id number]] Collected: 04/26/24 1712    Order Status: Completed Specimen: Blood from Peripheral, Forearm, Left Updated: 05/01/24 1812     Blood Culture, Routine No growth after 5 days.            Reviewed and noted in plan where applicable- Please see chart for full lab data.    Lines/Drains:       Peripheral IV - Single Lumen 04/28/24 1026 22 G Posterior;Proximal;Right Forearm (Active)   Site Assessment Clean;Dry;Intact;No redness;No swelling 05/02/24 0400   Extremity Assessment Distal to IV No abnormal discoloration;No redness;No swelling;No warmth 05/02/24 0400   Line Status Flushed 05/02/24 0400   Dressing Status Clean;Dry;Intact 05/02/24 0400   Dressing Intervention Integrity maintained 05/02/24 0400   Dressing Change Due 05/02/24 05/02/24 0400   Site Change Due 05/02/24 05/02/24 0400   Reason Not Rotated Not due 05/02/24 0400   Number of days: 3       Imaging      Results for orders placed during the hospital encounter of 04/03/24    Echo    Interpretation Summary    Left Ventricle: The left ventricle is normal in size. Increased wall thickness. There is concentric remodeling. There is normal systolic function with a visually estimated ejection fraction of 55 - 60%. Grade I diastolic dysfunction.    Right Ventricle: Normal right ventricular cavity size. Wall thickness is normal. Right ventricle wall motion  is normal. Systolic function is normal.      US SCROTUM AND TESTICLES WITH DOPPLER (XPD)  Narrative: EXAMINATION:  US SCROTUM AND TESTICLES WITH DOPPLER (XPD)    CLINICAL HISTORY:  left testicle pain;    TECHNIQUE:  Sonography of the scrotum and testes.    COMPARISON:  None.    FINDINGS:  Right Testicle:    *Size: 4.9 x 2.1 x 3.4 cm  *Appearance: Normal.  *Flow: Normal arterial and venous flow  *Epididymis: Normal.  *Hydrocele: Small simple appearing hydrocele.  *Varicocele: None.  .    Left Testicle:    *Size: 5 x 2.3  "x 2.5 cm  *Appearance: Normal.  *Flow: Normal arterial and venous flow  *Epididymis: Asymmetric thickening and relative echogenicity of the epididymis.  Question of increased vascularity peripherally.  Anechoic, cystic structure at the epididymis measures 7 x 5 x 4 mm in keeping with cysts versus spermatocele.  Additionally noted is an adjoining echogenic focus with the epididymis measuring on the order of 2 x 2 x 2 mm, possibly nonspecific calcification.  *Hydrocele: None.  *Varicocele: None.  .    Other findings: None.  Impression: 1. No definite acute testicular findings.  No evidence of torsion at the present time.  2. Small simple appearing right-sided hydrocele.  3. Asymmetrically increased caliber of the left epididymis with relative hypodensity and question of surrounding increased vascularity.  Correlation for signs/symptoms of epididymitis would be recommended.  4. Additional details, as provided in the body of report.    Electronically signed by: Mayco Peng  Date:    05/06/2024  Time:    09:50      Labs, Imaging, EKG and Diagnostic results from 5/8/2024 were reviewed.    Medications:  Medication list was reviewed and changes noted under Assessment/Plan.  No current facility-administered medications on file prior to encounter.     Current Outpatient Medications on File Prior to Encounter   Medication Sig Dispense Refill    BD ULTRA-FINE RAHUL PEN NEEDLE 32 gauge x 5/32" Ndle To use 6 daily 400 each 3    digoxin (LANOXIN) 125 mcg tablet TAKE 1 TABLET BY MOUTH ONCE DAILY. 90 tablet 3    ergocalciferol (ERGOCALCIFEROL) 50,000 unit Cap Take 1 capsule (50,000 Units total) by mouth every 7 days. 12 capsule 3    EScitalopram oxalate (LEXAPRO) 20 MG tablet Take 20 mg by mouth every evening.      folic acid (FOLVITE) 1 MG tablet Take 1 tablet (1 mg total) by mouth once daily. 30 tablet 5    ketoconazole (NIZORAL) 2 % cream Apply to affected area DAILY 30 g 3    lamiVUDine (EPIVIR) 150 MG Tab TAKE 1 TABLET BY " MOUTH EVERY DAY 30 tablet 23    multivitamin (THERAGRAN) per tablet Take 1 tablet by mouth once daily.      nitroGLYCERIN (NITROSTAT) 0.4 MG SL tablet PLACE 1 TABLET UNDER THE TONGUE EVERY 5 MINUTES AS NEEDED FOR CHEST PAIN. 100 tablet 2    nystatin (MYCOSTATIN) powder Apply topically 4 (four) times daily. Apply to intertriginous areas as directed up to four times daily to reduce moisture. for 14 days 30 g 0    PROAIR HFA 90 mcg/actuation inhaler       tirzepatide 7.5 mg/0.5 mL PnIj Inject 7.5 mg into the skin every 7 days. 4 Pen 11    tocilizumab 162 mg/0.9 mL PnIj Inject 162 mg into the skin once a week. 4 mL 0    walker Misc 1 Units by Misc.(Non-Drug; Combo Route) route daily as needed (Rollator Walker). 1 each 0     Scheduled Medications:  Current Facility-Administered Medications   Medication Dose Route Frequency    acetaminophen  1,000 mg Oral Q8H    digoxin  0.125 mg Oral Daily    doxycycline  100 mg Oral Q12H    enoxparin  40 mg Subcutaneous Daily    EScitalopram oxalate  20 mg Oral QHS    gabapentin  400 mg Oral TID    Lactobacillus rhamnosus GG  1 capsule Oral Daily    LIDOcaine  1 patch Transdermal Q24H    magnesium sulfate IVPB  2 g Intravenous Once    polyethylene glycol  17 g Oral BID     PRN:   Current Facility-Administered Medications:     dextrose 10%, 12.5 g, Intravenous, PRN    dextrose 10%, 25 g, Intravenous, PRN    glucagon (human recombinant), 1 mg, Intramuscular, PRN    glucose, 16 g, Oral, PRN    glucose, 24 g, Oral, PRN    insulin aspart U-100, 0-5 Units, Subcutaneous, QID (AC + HS) PRN    naloxone, 0.02 mg, Intravenous, PRN    ondansetron, 4 mg, Intravenous, Q8H PRN    oxyCODONE, 5 mg, Oral, Q4H PRN    oxyCODONE, 10 mg, Oral, Q4H PRN    sodium chloride 0.9%, 10 mL, Intravenous, Q12H PRN  Infusions:       Estimated Creatinine Clearance: 114.2 mL/min (based on SCr of 0.7 mg/dL).             Assessment/Plan:      * Intractable pain  -Due to new L1 fracture   -Hydromorphone 1 mg IV q4h prn for  severe pain  -Oxycodone 10 mg q4h prn for moderate pain   -Per spine surgery  MRI L sp 4/27 (partially completed): acute L1 compression fx with mild retropulsion. No severe stenosis. post kyphopasty at T11, L3   -Xrays pending now TSLO brace  -Repeat MRI w/ contrast w/ sedation neg for path fracture;  -chest CT neg for any definitive malignancy ; has lung nodules - pt rec'd outpt f/u.  -will await therapy session w/ PT to see if pt's pain is controlled or needs kyphoplasty.      5/2 awaiting PT session to see if pt can tolerate pain off dilaudid; otherwise  needs kyphoplasty. off  dilaudid and continue oxycodone 5/10mg q 4h PRN.continue tylenol 1000mg q8h, Gabapentin BID and robaxin. needs SNF        Testicular pain, left  5/6 c/o left sided testicular pain 4/10. no dysuria. 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 Repeat washout performed 11/17/22. US scrotum and testes -No definite acute testicular findings.  No evidence of torsion at the present time. Small simple appearing right-sided hydrocele. Asymmetrically increased caliber of the left epididymis with relative hypodensity and question of surrounding increased vascularity.  likely epididymitis . started on Doxycycline BID x 10 days.  scrotal support . towel under scrotum. icepacks as needed    Constipation  started on miralax BID      Nephrolithiasis  . Bilateral nonobstructive nephrolithiasis. UA negative       Enteritis  improved as above       Diarrhea of presumed infectious origin  C diff testing negative  Afebrile; no abx needed at this time  Seems resolved now  CT showing   Liquid stool throughout the large bowel and within several small bowel loops, taken together, suggests diarrheal illness possibly in the setting of enteritis. And Left perinephric fat stranding noting somewhat delayed excretion of contrast by the left kidney.     Imodium prn   probiotic  CRP is downtrending      Lumbar  vertebral fracture    5/2  s/p T11 and L3 kyphoplasty with pain management Dr. Aviles on 4/22/2024, now with worsening back pain and diarrhea. Has had multiple ED visits in the past week, CT A/P and MRI show new acute compression fx of L1 with mild retropulsion. In significant pain but remains neuro intact. MRI L sp 4/27 (partially completed): acute L1 compression fx with mild retropulsion. No severe stenosis. S/p kyphopasty at T11, L3. CT chest malignancy workup completed, R upper lobe opacity. Read reports malignancy less likely. s/p NSGY Eval - multimodal pain control with narcotics, tylenol/NSAIDs, muscle relaxants. TLSO brace when upright/OOB. No clear evidence of pathological fracture. consider L1 kyphoplasty for ongoing pain in the future. Patient not interested at this time. needs Optimization of bone quality due to  osteopenia on DXA scan from 2023. awaiting PT session to see if pt can tolerate pain off dilaudid; otherwise  needs kyphoplasty. off  dilaudid and continue oxycodone 5/10mg q 4h PRN.continue tylenol 1000mg q8h, Gabapentin BID and robaxin. needs SNF      Rheumatoid arthritis involving multiple sites with positive rheumatoid factor  Hx of RA on tocilizumab, last in march 2024, supposed to be on lamivudine for hep B ppx    Supraventricular tachycardia  Hx of SVT on digoxin, coreg but hasn't been taking coreg    Continue digoxin for now   Will give IV magnesium 2gm  Optimize potassium > 4    Coronary artery disease  Hx of CAD  Hold ASA for now     Type 2 diabetes mellitus with hyperglycemia, with long-term current use of insulin  Hasn't been taking insulin  Will hold off on scheduled insulin  Monitor glucose AC/HS and utilize prn insulin     Patient's FSGs are controlled on current hypoglycemics.   Last A1c reviewed-   Lab Results   Component Value Date    HGBA1C 7.4 (H) 03/01/2024    HGBA1C 7.0 (H) 12/01/2023    HGBA1C 7.1 (H) 09/29/2023     Will hold PO hypoglycemics and will start correctional  scale insulin  Most recent fingerstick glucose reviewed-   Recent Labs   Lab 05/05/24  0845 05/05/24  1520 05/05/24  2102 05/06/24  0738   POCTGLUCOSE 176* 185* 162* 170*     currently on   Antihyperglycemics (From admission, onward)      Start     Stop Route Frequency Ordered    04/27/24 0133  insulin aspart U-100 pen 0-5 Units         -- SubQ Before meals & nightly PRN 04/27/24 0133             Chronic diastolic heart failure  Hasn't been on lasix   Currently hypovolemic due to diarrhea, s/p 1 L of IVF in the ED  Will encourage PO hydration and utilize IV fluids if needed     Echo    Interpretation Summary    Left Ventricle: The left ventricle is normal in size. Increased wall thickness. There is concentric remodeling. There is normal systolic function with a visually estimated ejection fraction of 55 - 60%. Grade I diastolic dysfunction.    Right Ventricle: Normal right ventricular cavity size. Wall thickness is normal. Right ventricle wall motion  is normal. Systolic function is normal.         Hepatitis B core antibody positive  Patient is supposed to be on ppx with lamivudine but hasn't been taking it for few weeks  Will hold off resuming it for now, needs to be resumed at discharge         VTE Risk Mitigation (From admission, onward)           Ordered     enoxaparin injection 40 mg  Daily         04/27/24 0133     IP VTE HIGH RISK PATIENT  Once         04/27/24 0133     Place sequential compression device  Until discontinued         04/27/24 0133                    Discharge Planning   ALEXANDER: 5/8/2024     Code Status: Full Code   Is the patient medically ready for discharge?: (No Documentation)    Reason for patient still in hospital (select all that apply): Treatment  Discharge Plan A: Skilled Nursing Facility                  Sung Scherer MD  Department of Hospital Medicine   [facility] - Neurosurgery ([facility])

## 2024-05-10 ENCOUNTER — OUTPATIENT CASE MANAGEMENT (OUTPATIENT)
Dept: ADMINISTRATIVE | Facility: OTHER | Age: 69
End: 2024-05-10
Payer: MEDICARE

## 2024-05-10 NOTE — PROGRESS NOTES
05/10/24- Discharged to [facility] on 05/08/24. OPCM Case Closure.

## 2024-05-16 NOTE — PHYSICIAN QUERY
Please clarify if there is any clinical correlation between past medical history of osteoporosis and L1 new fracture.  Due to or associated with each other

## 2024-05-20 ENCOUNTER — OFFICE VISIT (OUTPATIENT)
Dept: PAIN MEDICINE | Facility: CLINIC | Age: 69
End: 2024-05-20
Payer: MEDICARE

## 2024-05-20 VITALS
DIASTOLIC BLOOD PRESSURE: 89 MMHG | OXYGEN SATURATION: 100 % | BODY MASS INDEX: 28.39 KG/M2 | SYSTOLIC BLOOD PRESSURE: 115 MMHG | HEART RATE: 124 BPM | WEIGHT: 202.81 LBS | RESPIRATION RATE: 12 BRPM | HEIGHT: 71 IN

## 2024-05-20 DIAGNOSIS — S32.010D CLOSED COMPRESSION FRACTURE OF THORACOLUMBAR VERTEBRA WITH ROUTINE HEALING, SUBSEQUENT ENCOUNTER: ICD-10-CM

## 2024-05-20 DIAGNOSIS — S22.080D CLOSED COMPRESSION FRACTURE OF THORACOLUMBAR VERTEBRA WITH ROUTINE HEALING, SUBSEQUENT ENCOUNTER: ICD-10-CM

## 2024-05-20 DIAGNOSIS — R52 INTRACTABLE PAIN: ICD-10-CM

## 2024-05-20 DIAGNOSIS — S32.010A COMPRESSION FRACTURE OF L1 VERTEBRA, INITIAL ENCOUNTER: Primary | ICD-10-CM

## 2024-05-20 PROCEDURE — 4010F ACE/ARB THERAPY RXD/TAKEN: CPT | Mod: CPTII,S$GLB,, | Performed by: STUDENT IN AN ORGANIZED HEALTH CARE EDUCATION/TRAINING PROGRAM

## 2024-05-20 PROCEDURE — 3008F BODY MASS INDEX DOCD: CPT | Mod: CPTII,S$GLB,, | Performed by: STUDENT IN AN ORGANIZED HEALTH CARE EDUCATION/TRAINING PROGRAM

## 2024-05-20 PROCEDURE — 3066F NEPHROPATHY DOC TX: CPT | Mod: CPTII,S$GLB,, | Performed by: STUDENT IN AN ORGANIZED HEALTH CARE EDUCATION/TRAINING PROGRAM

## 2024-05-20 PROCEDURE — 99999 PR PBB SHADOW E&M-EST. PATIENT-LVL V: CPT | Mod: PBBFAC,,, | Performed by: STUDENT IN AN ORGANIZED HEALTH CARE EDUCATION/TRAINING PROGRAM

## 2024-05-20 PROCEDURE — 3051F HG A1C>EQUAL 7.0%<8.0%: CPT | Mod: CPTII,S$GLB,, | Performed by: STUDENT IN AN ORGANIZED HEALTH CARE EDUCATION/TRAINING PROGRAM

## 2024-05-20 PROCEDURE — 1159F MED LIST DOCD IN RCRD: CPT | Mod: CPTII,S$GLB,, | Performed by: STUDENT IN AN ORGANIZED HEALTH CARE EDUCATION/TRAINING PROGRAM

## 2024-05-20 PROCEDURE — 3074F SYST BP LT 130 MM HG: CPT | Mod: CPTII,S$GLB,, | Performed by: STUDENT IN AN ORGANIZED HEALTH CARE EDUCATION/TRAINING PROGRAM

## 2024-05-20 PROCEDURE — 1125F AMNT PAIN NOTED PAIN PRSNT: CPT | Mod: CPTII,S$GLB,, | Performed by: STUDENT IN AN ORGANIZED HEALTH CARE EDUCATION/TRAINING PROGRAM

## 2024-05-20 PROCEDURE — 3079F DIAST BP 80-89 MM HG: CPT | Mod: CPTII,S$GLB,, | Performed by: STUDENT IN AN ORGANIZED HEALTH CARE EDUCATION/TRAINING PROGRAM

## 2024-05-20 PROCEDURE — 99213 OFFICE O/P EST LOW 20 MIN: CPT | Mod: S$GLB,,, | Performed by: STUDENT IN AN ORGANIZED HEALTH CARE EDUCATION/TRAINING PROGRAM

## 2024-05-20 PROCEDURE — 1160F RVW MEDS BY RX/DR IN RCRD: CPT | Mod: CPTII,S$GLB,, | Performed by: STUDENT IN AN ORGANIZED HEALTH CARE EDUCATION/TRAINING PROGRAM

## 2024-05-20 PROCEDURE — 3061F NEG MICROALBUMINURIA REV: CPT | Mod: CPTII,S$GLB,, | Performed by: STUDENT IN AN ORGANIZED HEALTH CARE EDUCATION/TRAINING PROGRAM

## 2024-05-20 NOTE — PROGRESS NOTES
Subjective:      Chief Complaint: No chief complaint on file.    HPI: Nithin Wagner is a 68 y.o. male with diabetes, dyslipidemia, hypertension, diastolic heart failure, PVD, RA, vitamin D deficiency, osteoporosis, obesity, and tobacco use who is here for a follow-up evaluation for diabetes. Last seen by me in Jan 2024    The patient location is: at home  The chief complaint leading to consultation is: diabetes    Visit type: audiovisual    Face to Face time with patient: 27 minutes   35 minutes of total time spent on the encounter, which includes face to face time and non-face to face time preparing to see the patient (eg, review of tests), Obtaining and/or reviewing separately obtained history, Documenting clinical information in the electronic or other health record, Independently interpreting results (not separately reported) and communicating results to the patient/family/caregiver, or Care coordination (not separately reported).    Each patient to whom he or she provides medical services by telemedicine is:  (1) informed of the relationship between the physician and patient and the respective role of any other health care provider with respect to management of the patient; and (2) notified that he or she may decline to receive medical services by telemedicine and may withdraw from such care at any time.    Patient reported in 2007, had heart issues (atrial myxoma), surgery, issues with anesthesia. Then had pain, f/u showed lung issues, then surgery 2008. Later RA, on mtx, other meds. Including steroids.    States he has lost around 70 pounds since Jan 2022 -diet and addition of Mounjaro. 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     Today, he signs in for visit from SNF at [facility]. He had a fall in April 2024 -fractured T11 and had kyphoplasty. Since his last visit, stopped Mounjaro due to severe constipation. He has not needed insulin in the past 4 weeks as he  stopped steroids about one month ago.  States he has been having more diarrhea lately -after every meal. He agrees to discus     With regards to the diabetes:  Diagnosed with T2DM since around 2285-1929. Had symptoms, sugar 300. Metformin initially, other pills, GLP1, then lately insulin.     Known complications:    Retinopathy- Denies; has upcoming appt    Nephropathy- No    Neuropathy- No    CAD? Yes, hx stents. Also HFpEF    CVA- TIA with myxoma, denies CVA in the past     Today, using inpen   He has stopped using omnipod-cost prohibitive.    Medications  He is not on medication for diabetes   Was on MDI Tresiba 60 units daily and Novolog ISF 15;I:C 1:8     He was giving novolg 6-10 times daily; giving about 70 units of novolog daily  He is changing needle every time. He is rotating injection sites. He is giving novolog before a meal 45 minutes     Missed doses-N/A    Other medications tried:   onglyza-caused HF   metformin. Wasn't helping as much   other meds   Trulicity: 4.5 mg/week. Tuesdays. Stopped in the hospital   Basal insulin: Tresiba 44 units daily   Prandial insulin: Novolog 14 units TIDWM plus scale.    Mounjaro -- constipation     #4 times a day testing  Dexcom -has not worn since 4/26 but plans on putting back on tomorrow when he is discharged. Blood sugar checked while in SNF and not below 100 nor above 175   Dexcom reviewed: blood sugars are at goal; rare PP hyperglycemia after dinner   See media tab    Log reviewed: Blood sugars are very variable. He is giving several doses of short acting insulin daily due to steroids but he is chasing high and low blood sugars.     Hypoglycemic event- He corrects a blood sugar dropping to 100 States occurring 4 times daily   Knows how to correct with 15 grams of carbs- juice, coke, or a peppermint.     Dietary recall: He is working on diet.  Eats 2 meals a day (50-60 carbs) with 3 fair to large snacks (30 carbs) .   Trying to do low carb diet.   Snacks: cheese  and crackers or sardines   Drinks: caffeine in coffee spikes blood sugar and was drinking 3 pots in the past     Exercise - He is trying to incorporate exercise -walking and using rubber bands     Education - last visit: 6/2023    Has a Medic alert tag- has pendant for falls   Has been having  more pain when walking lately  Glucagon/Baqsimi- does not want    Diabetes Management Status  Statin: Not taking- did not tolerate  ACE/ARB: Taking    Lab Results   Component Value Date    HGBA1C 7.4 (H) 03/01/2024    HGBA1C 7.0 (H) 12/01/2023    HGBA1C 7.1 (H) 09/29/2023     Screening or Prevention Patient's value Goal Complete/Controlled?   HgA1C Testing and Control   Lab Results   Component Value Date    HGBA1C 7.4 (H) 03/01/2024      Annually/Less than 8% Yes   Lipid profile : 12/01/2023 Annually Yes   LDL control Lab Results   Component Value Date    LDLCALC 128.6 12/01/2023    Annually/Less than 100 mg/dl  No   Nephropathy screening Lab Results   Component Value Date    LABMICR 23.0 03/01/2024     Lab Results   Component Value Date    PROTEINUA Negative 05/06/2024    Annually Yes   Blood pressure BP Readings from Last 1 Encounters:   05/20/24 115/89    Less than 140/90 Yes   Dilated retinal exam : 01/30/2024 Annually Yes   Foot exam   Most Recent Foot Exam Date: Not Found Annually No     He is seeing hepatology      Latest Reference Range & Units 09/21/23 11:54 09/29/23 15:01   AST 10 - 40 U/L 25 39   ALT 10 - 44 U/L 51 (H) 89 (H)   (H): Data is abnormally high    With regards to osteoporosis,     Based on FRAX score     He is on ergo 50k weekly and MVI with 1000 IU daily  He has calcium in diet; eating more cheese and dairy     current medication: none and did not want treatment    He had a fall in April 2024 -fractured T11, L1, L3 and had kyphoplasty.    Weight bearing exercise- Walking and also using rubber bands for strength training   Recent falls- stumbles and grabs himself once every 2 to 3 days; actual fall in  Feb/March 2023    They have made fall precautions in house   Dental work- denies     No recent fractures   No significant height loss (>2 inches)    tob use -  he has stopped smoking since hospitalization   etoh use-denies      No current diarrhea or h/o malabsorption  + chronic exposure to steroid therapy,  - anticoagulants, proton pump inhibitors or antiseizure medications.     + GERD, indigestion,   Denies gastric bypass surgery.     Denies history of thyroid disease, anemia, kidney stones or kidney disease.     Denies active malignancy, history of malignancy the involved the bone, prior radiation treatment, or unexplained elevations of alk phos on labs     Bone density    7/5/23 DXA  FINDINGS:  The bone mineral density measured from L1 through L4 is 1.307g/cm2.  This corresponds to a T score of 0.6 and a Z score of 0.5.  Decrease in bone mineral density by 4%.     The bone mineral density within the left femoral neck measures 0.816 g/cm2.  This corresponds to a T score of -2.0 and a Z score of -1.1.     The bone mineral density within the right femoral neck measures 0.855 g/cm2.  This corresponds to a T score of -1.7 and a Z score of -0.8.  Decrease in bone mineral density by 10%.     FRAX RESULTS:     10-year Probability of Fracture:   Major Osteoporotic Fracture 10.3%.   Hip Fracture 4.1%.   Impression:     Osteopenia both hips with decrease in bone mineral density by 10%.   Latest Reference Range & Units 09/29/23 15:01   Vit D, 25-Hydroxy 30 - 96 ng/mL 30     With regards to dyslipidemia,   Unable to tolerate statin- allergy  States he has tried several in the past and caused heart arrhythmia   Does not want to try praulent or repatha      Latest Reference Range & Units 12/01/23 08:05   Cholesterol Total 120 - 199 mg/dL 221 (H)   HDL 40 - 75 mg/dL 61   HDL/Cholesterol Ratio 20.0 - 50.0 % 27.6   Non-HDL Cholesterol mg/dL 160   Total Cholesterol/HDL Ratio 2.0 - 5.0  3.6   Triglycerides 30 - 150 mg/dL 157 (H)    LDL Cholesterol 63.0 - 159.0 mg/dL 128.6   (H): Data is abnormally high    With regards to elevated prolactin-now normal,    Now normal    Currently pt reports no galactorrhea  + diplopia that recently started around 2 months ago  - chronic headaches    No unexplained weight changes.  + fatigue  He does report some sleep problems. Has pulse ox- decreasing O2. States he has CPAP but unable to tolerate mask. States he has scarring on trachea from intubation in the past    Denies cold intolerance   No nausea or vomiting  + orthostatic symptoms     The patient is not currently using any medication known to cause hyperprolactinemia such as: antipsychotics (risperidone, phenothiazines, haloperidol and butyrophenones),gastric motility drugs (metoclopramide and domperidone), or antihypertensives (methyldopa, reserpine, and verapamil).     Latest Reference Range & Units 06/21/22 07:23   Prolactin 3.5 - 19.4 ng/mL 28.8 (H)      Latest Reference Range & Units 06/21/22 07:23   FSH 0.95 - 11.95 mIU/mL 5.93   LH 0.6 - 12.1 mIU/mL 2.4   Prolactin 3.5 - 19.4 ng/mL 28.8 (H)   Sex Hormone Binding Globulin 22 - 77 nmol/L 79 (H)   Testosterone 250 - 1100 ng/dL 542   Testosterone, Bioavailable 110.0 - 575.0 ng/dL 63.7 (L)   Testosterone, Free 46.0 - 224.0 pg/mL 33.8 (L)      Latest Reference Range & Units 06/21/22 07:23 07/19/23 09:17   Prolactin 3.5 - 19.4 ng/mL 28.8 (H) 19.0   (H): Data is abnormally high    FIB-4 Calculation: 2.64 at 5/21/2024  3:03 PM     FIB-4 below 1.30 is considered as low-risk for advanced fibrosis  FIB-4 over 2.67 is considered as high-risk for advanced fibrosis  FIB-4 values between 1.30 and 2.67 are considered as intermediate-risk of advanced fibrosis for ages 36-64.     For ages > 64 the cut-off for low-risk goes to < 2.  This is a screening tool and clinical judgement should be used in the interpretation of these results.    Reviewed past medical, family, social history and updated as appropriate.      Review of Systems   Constitutional:  Positive for fatigue. Negative for unexpected weight change.   Eyes:  Negative for visual disturbance.   Endocrine: Positive for polyphagia. Negative for polydipsia and polyuria.        Hair loss   Musculoskeletal:  Positive for arthralgias, back pain and gait problem.     As above    Objective:     There were no vitals filed for this visit.    Prior vitals:  BP Readings from Last 5 Encounters:   05/20/24 115/89   05/08/24 133/69   04/26/24 107/69   04/25/24 137/66   04/24/24 125/73     Physical Exam  Neurological:      Mental Status: He is alert.       Wt Readings from Last 10 Encounters:   05/20/24 1129 92 kg (202 lb 13.2 oz)   04/30/24 0434 92.6 kg (204 lb 2.3 oz)   04/26/24 2241 91.3 kg (201 lb 4.5 oz)   04/26/24 1525 91.3 kg (201 lb 4.5 oz)   04/26/24 1413 91.3 kg (201 lb 4.5 oz)   04/25/24 1050 99.8 kg (220 lb)   04/24/24 1952 99.8 kg (220 lb)   04/22/24 0611 99.8 kg (220 lb)   04/17/24 1302 99.8 kg (220 lb)   04/11/24 1435 100.2 kg (220 lb 14.4 oz)   04/03/24 1547 100.2 kg (221 lb)   03/28/24 1238 100.2 kg (221 lb)     Lab Results   Component Value Date    HGBA1C 7.4 (H) 03/01/2024     Lab Results   Component Value Date    CHOL 221 (H) 12/01/2023    HDL 61 12/01/2023    LDLCALC 128.6 12/01/2023    TRIG 157 (H) 12/01/2023    CHOLHDL 27.6 12/01/2023     Lab Results   Component Value Date     (L) 05/08/2024    K 3.9 05/08/2024    CL 98 05/08/2024    CO2 28 05/08/2024     (H) 05/08/2024    BUN 9 05/08/2024    CREATININE 0.7 05/08/2024    CALCIUM 8.2 (L) 05/08/2024    PROT 5.0 (L) 05/08/2024    ALBUMIN 1.9 (L) 05/08/2024    BILITOT 0.2 05/08/2024    ALKPHOS 137 (H) 05/08/2024    AST 36 05/08/2024    ALT 14 05/08/2024    ANIONGAP 7 (L) 05/08/2024    ESTGFRAFRICA >60 07/21/2022    EGFRNONAA >60 07/21/2022    TSH 1.819 04/26/2024      Lab Results   Component Value Date    MICALBCREAT 19.0 03/01/2024     Assessment/Plan:     1. Type 2 diabetes mellitus with  hyperglycemia, with long-term current use of insulin  insulin pen,reusable,BT,aspart (INPEN, NOVOLOG OR FIASP, PINK) InPn    Hemoglobin A1C    Hemoglobin A1C      2. Vitamin D deficiency  Vitamin D      3. Osteoporosis, unspecified osteoporosis type, unspecified pathological fracture presence  PTH, Intact    Protein Electrophoresis, Serum    Tissue Transglutaminase, IgA    Renal Function Panel      4. Class 1 obesity due to excess calories with serious comorbidity and body mass index (BMI) of 34.0 to 34.9 in adult        5. NAFLD (nonalcoholic fatty liver disease)        6. Tobacco use disorder        7. Severe obesity        8. Essential hypertension        9. Hyperlipidemia, unspecified hyperlipidemia type          Type 2 diabetes mellitus with hyperglycemia, with long-term current use of insulin  Reviewed goals of therapy - to get the best control we can without hypoglycemia. Goal <7.5   - last a1c at goal    No insulin nor steroids for the last month and blood sugars on dexcom until  were at goal. Self reported Bgs in SNF are at goal   He is going home tomorrow and will reapply dexcom. Send me a message if blood sugars are persistently above 200. Send me a message to review blood sugars in 1-2 weeks.   Will call in INPEN (his will ) in case he needs steroids in future; Would recommend IC ratio 1:8 and ISF 15  Consider glipizide versus prandin if another agent is needed when we review dexcom.  Reviewed patient's current insulin regimen. Clarified proper insulin dose and timing in relation to meals, etc. Insulin injection sites and proper rotation instructed.   - reviewed dose adjustments as well.   -Advised frequent self blood glucose monitoring. Patient encouraged to document glucose results and bring them to every clinic visit    -Hypoglycemia precautions discussed. Instructed on precautions before driving.    -Close adherence to lifestyle changes recommended.    -Periodic follow ups for eye  evaluations, foot care suggested.  Not on statin due to allergy - consider PCSK9 in future    Vitamin D deficiency  Continue Vitamin D        Osteoporosis  Based on FRAX score and now based on fracture after fall  Discussed considering his fall, I would prefer aggressive treatment for his bones with forteo/tymlos. Will check secondary causes of osteoporosis. Avoid falls. He stopped smoking. Encouraged exercise per PT. Continue vitamin D and calcium in diet. Has significant reflux - would avoid PO bisphosphonate's   Reclast -having significant RA pains at this time Will avoid for now. Prolia -he would like to avoid due to increased risk of infection due to steroids    Class 1 obesity due to excess calories with serious comorbidity and body mass index (BMI) of 34.0 to 34.9 in adult  Losing weight     NAFLD (nonalcoholic fatty liver disease)  Monitor LFTs  Seeing hepatology     Tobacco use disorder  Stopped smoking       Severe obesity  As above    Essential hypertension  bp controlled  continue regimen  F/u with PCP, monitor      Hyperlipidemia  Allergy to statin  Not interested in trying PCSK9    Visit today included increased complexity associated with the care of episodic problems type 2 diabetes, hyperlipidemia, and osteoporosis addressed and managing longitudinal care of the patient due to serious managed problems type 2 diabetes, hyperlipidemia, and osteoporosis     Agrees to look at AVS    Follow up in about 4 months (around 9/21/2024).  Labs in June 2024

## 2024-05-20 NOTE — PROGRESS NOTES
Chronic patient Established Note (Follow up visit)      SUBJECTIVE:    INTERVAL HISTORY 5/20/2024:  Mr. Wagner returns for lower back pain. Current Pain level is 7.5-9/10.  He continues to take his chronic pain medicines every 4 hours. He has not started any physical therapy due to his pain.  Since his last visit, he presented to the ED due to intractable pain. He is interested in a different TLSO brace to give him some additional support.  He continues to recover in the skilled nursing facility.    Interval History 4/26/2024:  The patient returns to clinic today for follow up of back pain. He is s/p T11 and L3 kyphoplasty on 4/22/2024. He felt better on Tuesday but then had worsened pain on Wednesday. He reports severe low back pain. He denies any radicular leg pain. His pain is worse with moving from sitting to standing. He cannot lay in a comfortable position. He has been to the ER twice this week. He also notes severe diarrhea. He denies any fever, chills, or drainage. His pain today is 10/10.    INTERVAL HISTORY 4/11/2024:  Nithin Wagner presents to the clinic for a follow-up appointment for chronic pain. Current pain intensity is 10/10.  Since the last visit, Nithin Wagner states:  he continues to have mid back and lower back pain. He also has sciatica pain, but feels the axial lower back pain tends to be worst.  It continues to affect his quality of life.  He is scheduled to have a kyphoplasty next Monday.    PRIOR HISTORY:   Nithin Wagner presents to the clinic for the evaluation of mid back, low back, and bilateral legs pain. The pain started 5 weeks ago following holding objects and adjusting to catch one from slipping and symptoms have been worsening.  He reports that he went to urgent care at that time and was given flexeril with no improvement. The pain is located in the mid back, low back area and radiates to the bilateral shooting leg pain. The thoracic pain is constant and continues to bother him.  He  feels he is able to control the sciatica pain with oral medications.  The pain is described as sharp and shooting and is rated as 7/10. The pain is rated with a score of  4/10 on the BEST day and a score of 10/10 on the WORST day.  Symptoms interfere with daily activity and sleeping. The pain is exacerbated by moving around, trying to stand up, taking more than a few steps.  The pain is mitigated by laying down and medications. The patient reports 1.5 hours of uninterrupted sleep per night.    Patient denies urinary incontinence and bowel incontinence. He admits to weakness in his legs. He reports if he bends his left knee, he will collapse.  He was recently diagnosed with osteoporosis. He has a history of rheumatoid arthritis, 3 stents, PE, right atrial myxoma, polycythemia. He has been on high doses of prednisone and has been trying to wean off of this, but his arthritic pain gets worse.    Physical Therapy/Home Exercise: no      Pain Medications:   - Norco 10-325mg (takes after 4-5 hours)   - Dilaudid 4mg (takes in the AM)   - gabapentin 300mg (doesn't take it - due to risk of side effects)     report:  Reviewed and consistent with medication use as prescribed.      Pain Procedures:   4/22/2024- T11 and L3 kyphoplasty      Imaging:   CT Abdomen and Pelvis 4/25/2024:  FINDINGS:  Images of the lower thorax are remarkable for bilateral bandlike atelectasis/scarring.     The liver is mildly hypoattenuating, may reflect sequela of contrast phase however correlation with LFTs as warranted to exclude changes of steatosis.  The spleen and gallbladder are unremarkable.  There is nodular thickening of the bilateral adrenal glands, nonspecific.  The stomach is decompressed without wall thickening.  The portal vein, splenic vein, SMV, celiac axis and SMA all are patent.  There is atrophic change of the pancreas without pancreatic ductal dilation.  No significant abdominal lymphadenopathy.     There is bilateral  perinephric fat stranding, left greater than right.  The kidneys enhance somewhat asymmetrically noting some delayed excretion by the left kidney.  There is bilateral nonobstructive nephrolithiasis.  There is a cyst arising from the interpolar region of the right kidney measuring 1.7 cm.  The right ureter is unremarkable without calculi seen.  There are subcentimeter low attenuating lesions arising from the left kidney, too small for characterization.  The left ureter is unremarkable without calculi seen.  The urinary bladder is decompressed without wall thickening.  The prostate is not enlarged.     There is liquid stool within the rectum and scattered throughout the large bowel.  There are a few scattered colonic diverticula without inflammation.  The terminal ileum is unremarkable.  The appendix is unremarkable.  There is scattered fluid content within several small bowel loops, without significant distension.  There are several scattered shotty periaortic, pericaval, and mesenteric lymph nodes.  There is mild ectasia of the infrarenal abdominal aorta noting atherosclerotic calcification throughout the aorta and its branches.  No focal organized pelvic fluid collection.  There are bilateral fat containing inguinal hernias.     There is osteopenia.  There are degenerative changes of the spine.  There is vertebroplasty change.  There is endplate height loss of L1 noting associated vacuum disc phenomenon.  This is new since MRI 03/23/2024.  There is moderate spinal canal stenosis at that level.  No significant inguinal lymphadenopathy.     Impression:     This report was flagged in Epic as abnormal.     1. Liquid stool throughout the large bowel and within several small bowel loops, taken together, suggests diarrheal illness possibly in the setting of enteritis.  Correlation is advised.  2. Left perinephric fat stranding noting somewhat delayed excretion of contrast by the left kidney.  Correlation with urinalysis  is recommended to exclude infection, differential would potentially include sequela of recently passed calculus.  3. Bilateral nonobstructive nephrolithiasis.  4. Possible hepatic steatosis, correlation with LFTs recommended.  5. Interval compression deformity involving the superior endplate of L1, new since prior MRI, correlation is advised.  6. Please see above for additional findings.    MRI LUMBAR SPINE WITHOUT CONTRAST     CLINICAL HISTORY:  Lumbar radiculopathy, no red flags, no prior management; Radiculopathy, lumbar region     TECHNIQUE:  Multiplanar, multisequence MR images were acquired from the thoracolumbar junction to the sacrum without the administration of contrast.     COMPARISON:  Radiographs 03/01/2024.     FINDINGS:  Lumbar spine alignment demonstrates dextroscoliosis with mild grade 1 retrolisthesis of L3 on L4.  No spondylolysis.  Prominent marrow edema at the inferior aspect of the L3 vertebral body with a superimposed linear hypointense band and associated irregularity and mild height loss of the inferior endplate.  Remaining vertebral body heights are well maintained.  No marrow signal abnormality to suggest an infiltrative process.     Multilevel degenerative disc space narrowing and desiccation most pronounced from L2-L3 through L4-L5.     Distal spinal cord demonstrates normal contour and signal intensity.  Cauda equina appears normal without findings to suggest arachnoiditis.  Conus medullaris terminates at L2.     Bilateral cortical renal cysts.  SI joints are symmetric.  Paraspinal musculature demonstrates normal bulk and signal intensity.     T12-L1: Prominent epidural fat.  No spinal canal stenosis.  No neural foraminal narrowing.     L1-L2: Left facet arthropathy and bilateral ligamentum flavum hypertrophy.  Prominent epidural fat.  No spinal canal stenosis.  No neural foraminal narrowing.     L2-L3: Circumferential disc bulge causes mass effect on the ventral thecal sac and lateral  recesses and demonstrates a superimposed right foraminal/extraforaminal broad-based protrusion.  Bilateral facet arthropathy and bilateral ligamentum flavum hypertrophy.  Prominent epidural fat.  Findings contribute to mild right neural foraminal narrowing.  No spinal canal stenosis.     L3-L4: Circumferential disc bulge causes mass effect on the ventral thecal sac and lateral recesses and encroaches into the bilateral foraminal zones.  Bilateral facet arthropathy and bilateral ligamentum flavum hypertrophy.  Prominent epidural fat.  Findings contribute to complete effacement of the thecal sac and moderate left and mild right neural foraminal narrowing.     L4-L5: Circumferential disc bulge causes mass effect on the ventral thecal sac and lateral recesses and encroaches into the bilateral foraminal zones.  Bilateral facet arthropathy and bilateral ligamentum flavum hypertrophy.  Prominent epidural fat.  Findings contribute to complete effacement of the thecal sac and severe left and mild right neural foraminal narrowing.     L5-S1: Posterior broad-based disc bulge.  Bilateral facet arthropathy.  Prominent epidural fat.  No spinal canal stenosis.  Mild right neural foraminal narrowing.     Impression:     1. Recent compression fracture of the L3 inferior endplate with mild height loss and moderate associated marrow edema.  No osseous retropulsion.  No epidural fluid collection.  2. Multilevel lumbar spondylosis and prominent epidural lipomatosis contributing to complete effacement of the thecal sac at L3-L4 and L4-L5 and multilevel neural foraminal narrowing most pronounced at L4-L5.  This report was flagged in Epic as abnormal.        Electronically signed by: Leeroy Bruce MD  Date:                                            03/24/2024  Time:                                           06:36    MRI THORACIC SPINE WITHOUT CONTRAST     CLINICAL HISTORY:  Mid-back pain;  Pain in thoracic spine      TECHNIQUE:  Multiplanar MR imaging of the thoracic spine was performed without the use of intravenous contrast     COMPARISON:  CT abdomen and pelvis 11/15/2022     FINDINGS:  Alignment: The thoracic spine demonstrates proper alignment.     Vertebra: Progressive height loss along the superior endplate of T11 now estimated at 60% (previously 20%).     Disc: Mild multilevel disc space narrowing.     Cord: The demonstrated portion of the spinal cord is normal in signal intensity at all levels with no indication of myelomalacia or cord edema.     Degenerative findings: Prominent posterior epidural fat deforming the thecal sac.  Complete effacement of the thecal sac beginning at T5-6 and extending through T 7-8 as well as at T10-11.  Mild mass effect upon the thoracic spinal cord at these levels.  No focal disc abnormality.  No neural foraminal narrowing.     Focus of T2 signal hyperintensity right kidney shown to represent a cyst by prior CT.     Impression:     Progressive height loss at T11 compared to CT November 2022.  Linear STIR signal abnormality along the superior endplate suggesting recent height loss.     Prominent epidural lipomatosis resulting in complete effacement of the thecal sac from T5-6 through T7-8 and T10-11 with mild mass effect upon the thoracic spinal cord at these levels.  No cord signal abnormality identified.        Electronically signed by: Johana Reynoso  Date:                                            03/25/2024  Time:                                           08:24    XR LUMBAR SPINE AP AND LATERAL     CLINICAL HISTORY:  Vertebrogenic low back pain     FINDINGS:  LUMBAR SPINE TWO VIEWS: THERE IS A T11 COMPRESSION FRACTURE THAT MAY HAVE PROGRESSED WHEN COMPARED TO THE PRIOR STUDY.  THERE IS BASELINE DJD AND DISH.     Impression:     T11 COMPRESSION FRACTURE MAY HAVE PROGRESSED FROM THE PRIOR STUDY.        Electronically signed by: Jace Ching MD  Date:                                   "          03/01/2024  Time:                                           13:03    XR THORACIC SPINE AP LATERAL     CLINICAL HISTORY:  Vertebrogenic low back pain     FINDINGS:  Thoracic spine two views: There is mild DJD.  There is dish.  There is a T11 compression deformity which was seen on the CT study dated 11/15/2022.  This may have progressed when compared to the prior study dated 11/15/2022.     Impression:     T11 compression deformity may have progressed from the prior CT.     DJD and dish.        Electronically signed by: Jace Ching MD  Date:                                            03/01/2024  Time:                                           13:02    Pain Disability Index Review:      5/20/2024    11:30 AM 4/26/2024     2:15 PM 3/14/2024    12:55 PM   Last 3 PDI Scores   Pain Disability Index (PDI) 50 56 40       Allergies:   Review of patient's allergies indicates:   Allergen Reactions    Enbrel [etanercept] Shortness Of Breath     CHF    Nsaids (non-steroidal anti-inflammatory drug) Other (See Comments)     hypertention  Other reaction(s): Other (See Comments)  hypertention  HTN  Patient states he tolerates anti inflammatory eye drops    Statins-hmg-coa reductase inhibitors Other (See Comments)     Heart arrythemias  Other reaction(s): Other (See Comments)  Heart arrythemias  Joint pain and cardiac arrythmias    Pcn [penicillins] Rash       Current Medications:   Current Outpatient Medications   Medication Sig Dispense Refill    aspirin (ECOTRIN) 81 MG EC tablet Take 1 tablet (81 mg total) by mouth once daily. 30 tablet 2    BD ULTRA-FINE RAHUL PEN NEEDLE 32 gauge x 5/32" Ndle To use 6 daily 400 each 3    digoxin (LANOXIN) 125 mcg tablet TAKE 1 TABLET BY MOUTH ONCE DAILY. 90 tablet 3    ergocalciferol (ERGOCALCIFEROL) 50,000 unit Cap Take 1 capsule (50,000 Units total) by mouth every 7 days. 12 capsule 3    EScitalopram oxalate (LEXAPRO) 20 MG tablet Take 20 mg by mouth every evening.      gabapentin " (NEURONTIN) 400 MG capsule Take 1 capsule (400 mg total) by mouth 3 (three) times daily. 90 capsule 11    ketoconazole (NIZORAL) 2 % cream Apply to affected area DAILY 30 g 3    Lactobacillus rhamnosus GG (CULTURELLE) 10 billion cell capsule Take 1 capsule by mouth once daily. 30 capsule 2    lamiVUDine (EPIVIR) 150 MG Tab TAKE 1 TABLET BY MOUTH EVERY DAY 30 tablet 23    LIDOcaine (LIDODERM) 5 % Place 1 patch onto the skin once daily. Remove & Discard patch within 12 hours or as directed by MD 30 patch 2    multivitamin (THERAGRAN) per tablet Take 1 tablet by mouth once daily.      nitroGLYCERIN (NITROSTAT) 0.4 MG SL tablet PLACE 1 TABLET UNDER THE TONGUE EVERY 5 MINUTES AS NEEDED FOR CHEST PAIN. 100 tablet 2    polyethylene glycol (GLYCOLAX) 17 gram/dose powder Use cap to measure 17 grams, mix in liquid and take by mouth once daily. 510 g 2    PROAIR HFA 90 mcg/actuation inhaler       tirzepatide 7.5 mg/0.5 mL PnIj Inject 7.5 mg into the skin every 7 days. 4 Pen 11    tocilizumab 162 mg/0.9 mL PnIj Inject 162 mg into the skin once a week. 4 mL 0    walker Misc 1 Units by Misc.(Non-Drug; Combo Route) route daily as needed (Rollator Walker). 1 each 0    folic acid (FOLVITE) 1 MG tablet Take 1 tablet (1 mg total) by mouth once daily. 30 tablet 5    nystatin (MYCOSTATIN) powder Apply topically 4 (four) times daily. Apply to intertriginous areas as directed up to four times daily to reduce moisture. for 14 days 30 g 0     No current facility-administered medications for this visit.       REVIEW OF SYSTEMS:  GENERAL:  current fevers or chills, recent use of antibiotics denies.  HEENT:  History of migraines/headaches: denies, History of major throat surgery: denies  RESPIRATORY:  History of home oxygen or pulmonary hypertension/severe breathing dysfunction: reports right pneumonectomy and upper lobectomy  CARDIOVASCULAR:  Hx of palpitations/rhythm problems: denies Hx of Heart Attacks/Surgery: reports prior 3 stents,  history right atrial myxoma  GI:  Recent abdominal discomfort or recent change in bowel habits reports abdominal pain (also has constipation)  MUSCULOSKELETAL:  See HPI.  SKIN:  unhealed wounds or rashes: reports scrotal yeast infection and skin tears related to prednisone  PSYCH: major psychiatric history or recent psychosocial stressors reports occasional suicidal thoughts  HEMATOLOGY/LYMPHOLOGY:  Hx of prolonged bleeding, Hx of Blood thinner usage: reports ASA 81mg ; reason: stents  NEURO:   history of seizures, strokes, chronic/old weakness (such as paralysis or paresis of any body part): reports TIA during period of myxoma (no deficits)  All other reviewed and negative other than HPI.    Past Medical History:  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    forehead    Stroke     TIA       Past Surgical History:  Past Surgical History:   Procedure Laterality Date    CATARACT EXTRACTION W/  INTRAOCULAR LENS IMPLANT Right 3/28/2024    Procedure: EXTRACTION, CATARACT, WITH IOL INSERTION;  Surgeon: Hans Woodward MD;  Location: [facility] OR;  Service: Ophthalmology;  Laterality: Right;    CORONARY ANGIOGRAPHY N/A 3/10/2021    Procedure: ANGIOGRAM, CORONARY ARTERY - right radial;  Surgeon: Shemar Dempsey MD;  Location: [facility] CATH LAB;  Service: Cardiology;  Laterality: N/A;    CORONARY STENT PLACEMENT  03/10/2021    prox-mid RCA Marshal 4.5 x 26 mm, 4.5 x 12 mm    FIXATION KYPHOPLASTY N/A 4/22/2024    Procedure: KYPHOPLASTY;  Surgeon: Stefan Stern MD;  Location: [facility] OR;  Service: Pain Management;  Laterality: N/A;  NO PROPOFOL     INCISION AND DRAINAGE OF SCROTUM N/A 11/15/2022    Procedure: INCISION AND DRAINAGE, SCROTUM;  Surgeon: William Hatch MD;  Location: [facility] OR;  Service: Urology;  Laterality: N/A;    INCISION AND DRAINAGE OF SCROTUM N/A 11/17/2022    Procedure: INCISION AND DRAINAGE, SCROTUM;  Surgeon: William Hatch MD;  Location: [facility] OR;  Service: Urology;  Laterality: N/A;    LUNG LOBECTOMY Right 2008    RUL lobectomy after removal of atrial myxoma    MAGNETIC RESONANCE IMAGING N/A 4/30/2024    Procedure: MRI (Magnetic Resonance Imagine);  Surgeon: Peace Watts;  Location: [facility];  Service: Anesthesiology;  Laterality: N/A;    PLEURA BIOPSY      RESECTION OF ATRIAL MYXOMA  2007       Family History:  Family History   Problem Relation Name Age of Onset    Hodgkin's lymphoma Mother      Diabetes type II Mother      Kidney failure Father      Diabetes type I Father      Cancer Sister         Social History:  Social History     Socioeconomic History    Marital status: Single   Occupational History    Occupation: , respiratory therapist, founded [employer]     Comment: Retired   Tobacco Use    Smoking status: Every Day     Current packs/day: 1.00     Average packs/day: 1 pack/day for 35.0 years (35.0 ttl pk-yrs)     Types: Cigarettes    Smokeless tobacco: Never   Substance and Sexual Activity    Alcohol use: Not Currently    Drug use: No    Sexual activity: Never     Social Determinants of Health     Financial Resource Strain: Low Risk  (4/30/2024)    Overall Financial Resource Strain (CARDIA)     Difficulty of Paying Living Expenses: Not very hard   Food Insecurity: No Food Insecurity (4/30/2024)    Hunger Vital Sign     Worried About Running Out of Food in the Last Year: Never true     Ran Out of Food in the Last Year: Never true   Transportation Needs: No Transportation Needs (4/30/2024)    PRAPARE - Transportation     Lack of Transportation (Medical): No     Lack of Transportation (Non-Medical): No  "  Physical Activity: Inactive (4/27/2024)    Exercise Vital Sign     Days of Exercise per Week: 0 days     Minutes of Exercise per Session: 0 min   Stress: Stress Concern Present (4/30/2024)    [employer] - Occupational Stress Questionnaire     Feeling of Stress : To some extent   Housing Stability: Low Risk  (4/30/2024)    Housing Stability Vital Sign     Unable to Pay for Housing in the Last Year: No     Homeless in the Last Year: No       OBJECTIVE:    /89 (BP Location: Right arm, Patient Position: Sitting, BP Method: Medium (Automatic))   Pulse (!) 124   Resp 12   Ht 5' 11" (1.803 m)   Wt 92 kg (202 lb 13.2 oz)   SpO2 100%   BMI 28.29 kg/m²     PHYSICAL EXAMINATION:  General appearance: Well appearing, in no acute distress, alert and appropriately communicative.  Psych:  Mood and affect appropriate.  Skin: bruising noted to BUE.  Head/face:  Atraumatic, normocephalic.  Cor: regular rate  Pulm: Symmetric chest rise, no respiratory distress noted.   Back: Straight leg raising in the sitting position is negative to radicular pain. There is pain with palpation over thoracic and lumbar paraspinals and spinous process. Exam limited due to pain.  Extremities: No deformities. No significant open wounds. No major amputations.  Musculoskeletal:  Bilateral lower extremity strength is 4/5.  No atrophy or tone abnormalities are noted.  Neuro: No loss of sensation noted.   Gait: wheelchair.     CMP  Sodium   Date Value Ref Range Status   05/08/2024 133 (L) 136 - 145 mmol/L Final     Potassium   Date Value Ref Range Status   05/08/2024 3.9 3.5 - 5.1 mmol/L Final     Chloride   Date Value Ref Range Status   05/08/2024 98 95 - 110 mmol/L Final     CO2   Date Value Ref Range Status   05/08/2024 28 23 - 29 mmol/L Final     Carbon Monoxide, Blood   Date Value Ref Range Status   11/04/2021 11 (H) % Final     Comment:     -------------------REFERENCE VALUE--------------------------  0-2 " Normal (Non-smoker) , < or = 9 Normal (Smoker), > or   = 20 (Toxic concentration)    Test Performed by:  [facility]  [address] [place] [address]  : Demetrius Saldana M.D. Ph.D.; CLIA# [id number]       Glucose   Date Value Ref Range Status   05/08/2024 134 (H) 70 - 110 mg/dL Final     BUN   Date Value Ref Range Status   05/08/2024 9 8 - 23 mg/dL Final     Creatinine   Date Value Ref Range Status   05/08/2024 0.7 0.5 - 1.4 mg/dL Final     Calcium   Date Value Ref Range Status   05/08/2024 8.2 (L) 8.7 - 10.5 mg/dL Final     Total Protein   Date Value Ref Range Status   05/08/2024 5.0 (L) 6.0 - 8.4 g/dL Final     Albumin   Date Value Ref Range Status   05/08/2024 1.9 (L) 3.5 - 5.2 g/dL Final   06/21/2022 4.1 3.6 - 5.1 g/dL Final     Total Bilirubin   Date Value Ref Range Status   05/08/2024 0.2 0.1 - 1.0 mg/dL Final     Comment:     For infants and newborns, interpretation of results should be based  on gestational age, weight and in agreement with clinical  observations.    Premature Infant recommended reference ranges:  Up to 24 hours.............<8.0 mg/dL  Up to 48 hours............<12.0 mg/dL  3-5 days..................<15.0 mg/dL  6-29 days.................<15.0 mg/dL       Alkaline Phosphatase   Date Value Ref Range Status   05/08/2024 137 (H) 55 - 135 U/L Final     AST   Date Value Ref Range Status   05/08/2024 36 10 - 40 U/L Final     ALT   Date Value Ref Range Status   05/08/2024 14 10 - 44 U/L Final     Anion Gap   Date Value Ref Range Status   05/08/2024 7 (L) 8 - 16 mmol/L Final     eGFR   Date Value Ref Range Status   05/08/2024 >60.0 >60 mL/min/1.73 m^2 Final     ASSESSMENT: 68 y.o. year old male with mid back and lower back pain, consistent with:     1. Compression fracture of L1 vertebra, initial encounter  Back/Cervical Brace For Home Use    Ambulatory referral/consult to Orthopedics    DXA Bone Density Axial Skeleton 1 or more sites       2. Intractable pain  Ambulatory referral/consult to Pain Clinic    Back/Cervical Brace For Home Use    Ambulatory referral/consult to Orthopedics    DXA Bone Density Axial Skeleton 1 or more sites      3. Closed compression fracture of thoracolumbar vertebra with routine healing, subsequent encounter  Back/Cervical Brace For Home Use    Ambulatory referral/consult to Orthopedics    DXA Bone Density Axial Skeleton 1 or more sites        IMPRESSION: Nithin Wagner presents today for mid back and lower back pain. History and physical exam are consistent with axial/vertebrogenic lower back pain.  My independent interpretation of the imaging is consistent with L1 compression fracture.  He had uncertain relief from his kyphoplasties, but unfortunately suffered an L1 compression fracture while recovering from his procedure.  At this point, he needs workup ASAP regarding his osteporosis considering his high risk of additional fractures. We can consider L1 kyphoplasty, however he feels his pain is starting to improve, so we can wait on this as well.    PLAN:   - He is s/p T11 and L3 kyphoplasty.   - we discussed L1 kyphoplasty, however he feels his pain is slowly improving  - Script given for TLSO brace  - Recent CT reviewed, new compression fracture at L1.   - referral to Orthopedics, [facility], for evaluation for bone fracture prevention    The above plan and management options were discussed at length with patient. Patient is in agreement with the above and verbalized understanding.    Stefan Stern  05/20/2024

## 2024-05-21 ENCOUNTER — TELEPHONE (OUTPATIENT)
Dept: ORTHOPEDICS | Facility: CLINIC | Age: 69
End: 2024-05-21
Payer: MEDICARE

## 2024-05-21 ENCOUNTER — OFFICE VISIT (OUTPATIENT)
Dept: ENDOCRINOLOGY | Facility: CLINIC | Age: 69
End: 2024-05-21
Payer: MEDICARE

## 2024-05-21 DIAGNOSIS — Z79.4 TYPE 2 DIABETES MELLITUS WITH HYPERGLYCEMIA, WITH LONG-TERM CURRENT USE OF INSULIN: Primary | ICD-10-CM

## 2024-05-21 DIAGNOSIS — I10 ESSENTIAL HYPERTENSION: ICD-10-CM

## 2024-05-21 DIAGNOSIS — E66.09 CLASS 1 OBESITY DUE TO EXCESS CALORIES WITH SERIOUS COMORBIDITY AND BODY MASS INDEX (BMI) OF 34.0 TO 34.9 IN ADULT: ICD-10-CM

## 2024-05-21 DIAGNOSIS — E55.9 VITAMIN D DEFICIENCY: ICD-10-CM

## 2024-05-21 DIAGNOSIS — M81.0 OSTEOPOROSIS, UNSPECIFIED OSTEOPOROSIS TYPE, UNSPECIFIED PATHOLOGICAL FRACTURE PRESENCE: ICD-10-CM

## 2024-05-21 DIAGNOSIS — E66.01 SEVERE OBESITY: ICD-10-CM

## 2024-05-21 DIAGNOSIS — F17.200 TOBACCO USE DISORDER: ICD-10-CM

## 2024-05-21 DIAGNOSIS — E78.5 HYPERLIPIDEMIA, UNSPECIFIED HYPERLIPIDEMIA TYPE: ICD-10-CM

## 2024-05-21 DIAGNOSIS — K76.0 NAFLD (NONALCOHOLIC FATTY LIVER DISEASE): ICD-10-CM

## 2024-05-21 DIAGNOSIS — E11.65 TYPE 2 DIABETES MELLITUS WITH HYPERGLYCEMIA, WITH LONG-TERM CURRENT USE OF INSULIN: Primary | ICD-10-CM

## 2024-05-21 PROCEDURE — 4010F ACE/ARB THERAPY RXD/TAKEN: CPT | Mod: CPTII,95,, | Performed by: NURSE PRACTITIONER

## 2024-05-21 PROCEDURE — 3061F NEG MICROALBUMINURIA REV: CPT | Mod: CPTII,95,, | Performed by: NURSE PRACTITIONER

## 2024-05-21 PROCEDURE — 1160F RVW MEDS BY RX/DR IN RCRD: CPT | Mod: CPTII,95,, | Performed by: NURSE PRACTITIONER

## 2024-05-21 PROCEDURE — 3066F NEPHROPATHY DOC TX: CPT | Mod: CPTII,95,, | Performed by: NURSE PRACTITIONER

## 2024-05-21 PROCEDURE — 3051F HG A1C>EQUAL 7.0%<8.0%: CPT | Mod: CPTII,95,, | Performed by: NURSE PRACTITIONER

## 2024-05-21 PROCEDURE — 99214 OFFICE O/P EST MOD 30 MIN: CPT | Mod: 95,,, | Performed by: NURSE PRACTITIONER

## 2024-05-21 PROCEDURE — G2211 COMPLEX E/M VISIT ADD ON: HCPCS | Mod: 95,,, | Performed by: NURSE PRACTITIONER

## 2024-05-21 PROCEDURE — 1111F DSCHRG MED/CURRENT MED MERGE: CPT | Mod: CPTII,95,, | Performed by: NURSE PRACTITIONER

## 2024-05-21 PROCEDURE — 1159F MED LIST DOCD IN RCRD: CPT | Mod: CPTII,95,, | Performed by: NURSE PRACTITIONER

## 2024-05-21 RX ORDER — INSULIN PEN,REUSABLE,BT LISPRO
INSULIN PEN (EA) SUBCUTANEOUS
Qty: 1 EACH | Refills: 1 | Status: SHIPPED | OUTPATIENT
Start: 2024-05-21

## 2024-05-21 NOTE — ASSESSMENT & PLAN NOTE
Based on FRAX score and now based on fracture after fall  Discussed considering his fall, I would prefer aggressive treatment for his bones with forteo/tymlos. Will check secondary causes of osteoporosis. Avoid falls. He stopped smoking. Encouraged exercise per PT. Continue vitamin D and calcium in diet. Has significant reflux - would avoid PO bisphosphonate's   Reclast -having significant RA pains at this time Will avoid for now. Prolia -he would like to avoid due to increased risk of infection due to steroids

## 2024-05-21 NOTE — PATIENT INSTRUCTIONS
Osteoporosis               Based on FRAX score and now based on fracture after fall  Discussed considering his fall, I would prefer aggressive treatment for his bones with forteo/tymlos. Will check secondary causes of osteoporosis. Avoid falls. He stopped smoking. Encouraged exercise per PT. Continue vitamin D and calcium in diet. Has significant reflux - would avoid PO bisphosphonate's   Reclast -having significant RA pains at this time Will avoid for now. Prolia -he would like to avoid due to increased risk of infection due to steroids     Cholesterol               LDL goal less than 70              Consider Praulent in the future if needed   Check LP in future       Diabetes  No insulin nor steroids for the last month and blood sugars on dexcom until  were at goal. Self reported Bgs in SNF are at goal   He is going home tomorrow and will reapply dexcom. Send me a message if blood sugars are persistently above 200. Send me a message to review blood sugars in 1-2 weeks.   Will call in INPEN (his will ) in case he needs steroids in future; Would recommend IC ratio 1:8 and ISF 15  Consider glipizide versus prandin if another agent is needed when we review dexcom.    Goal blood sugar is  fasting   Goal blood sugar 1 hour after meal is less than 180  Goal blood sugar 2 hour after meal is less than 140      Tscore  -1.0 osteopenia  -2.5 osteoporosis  -3.0 is severe osteoporosis     FRAX score   >20% major bone in body   >3% hip    Recommended daily allowance of calcium is 8203-3519 mg daily, preferably from food. See below  Recommended daily allowance of vitamin D is 6823-7218 IU daily    Encouraged weight bearing exercise  Encouraged to avoid falls  Avoid alcohol and tobacco    Estimated Calcium Content of Foods:  Produce  Serving Size Estimated Calcium*    Rebeca greens, frozen 8 oz 360 mg   Broccoli gabbie 8 oz 200 mg   Kale, frozen 8 oz 180 mg   Soy Beans, green, boiled 8 oz 175 mg   Bok Nu, cooked,  boiled 8 oz 160 mg   Figs, dried 2 figs 65 mg   Broccoli, fresh, cooked 8 oz 60 mg   Oranges 1 whole 55 mg   Seafood Serving Size Estimated Calcium*    Sardines, canned with bones 3 oz 325 mg   [place], canned with bones 3 oz 180 mg   Shrimp, canned 3 oz 125 mg   Dairy Serving Size Estimated Calcium*    Ricotta, part-skim 4 oz 335 mg   Yogurt, plain, low-fat 6 oz 310 mg   Milk, skim, low-fat, whole 8 oz 300 mg   Yogurt with fruit, low-fat 6 oz 260 mg   Mozzarella, part-skim 1 oz 210 mg   Cheddar 1 oz 205 mg   Yogurt, Greek 6 oz 200 mg   American Cheese 1 oz 195 mg   Feta Cheese 4 oz 140 mg   Cottage Cheese, 2% 4 oz 105 mg   Frozen yogurt, vanilla 8 oz 105 mg   Ice Cream, vanilla 8 oz 85 mg   Parmesan 1 tbsp 55 mg   Fortified Food Serving Size Estimated Calcium*   [place] milk, rice milk or soy milk, fortified 8 oz 300 mg   Orange juice and other fruit juices, fortified 8 oz 300 mg   Tofu, prepared with calcium 4 oz 205 mg   Waffle, frozen, fortified 2 pieces 200 mg   Oatmeal, fortified 1 packet 140 mg   English muffin, fortified 1 muffin 100 mg   Cereal, fortified 8 oz 100-1,000 mg   Other Serving Size Estimated Calcium*   Mac & cheese, frozen 1 package 325 mg   Pizza, cheese, frozen 1 serving 115 mg   Pudding, chocolate, prepared with 2% milk 4 oz 160 mg   Beans, baked, canned 4 oz 160 mg   *The calcium content listed for most foods is estimated and can vary due to multiple factors. Check the food label to determine how much calcium is in a particular product.  If you read the nutrition label for a food source, it lists the % calcium in that food.  For an 8 oz glass of milk, for example, the label states calcium 30%.  This is equivalent to 300 mg of calcium (multiply the listed number by 10).   **Table from the National Osteoporosis Foundation      Osteoporosis medications  These are the medications we discussed for osteoporosis treatment:    - Bisphosphonates:         - these slow down bone loss         - oral forms  "(Fosamax and Actonel are examples) - taken once weekly or once monthly         - IV form (Reclast) - given intravenously once yearly    - Prolia (denosumab):          - slows down bone loss           - it is a subcutaneous injection (under the skin) every 6 months, given in the office    - Forteo (teriparatide) or Tymlos (abaloparatide):           - medications that "build bone" or increases bone formation           - subcutaneous injection (under the skin) taken once daily that you self-administer at home for 18-24 months    -Evenity (romosozumab)   -medications that "build bone" or increases bone formation   - subcutaneous injection (under the skin) taken once monthly for one year    For more information on medications: https://www.nof.org/patients/treatment/medicationadherence/    "

## 2024-05-21 NOTE — ASSESSMENT & PLAN NOTE
Reviewed goals of therapy - to get the best control we can without hypoglycemia. Goal <7.5   - last a1c at goal    No insulin nor steroids for the last month and blood sugars on dexcom until  were at goal. Self reported Bgs in SNF are at goal   He is going home tomorrow and will reapply dexcom. Send me a message if blood sugars are persistently above 200. Send me a message to review blood sugars in 1-2 weeks.   Will call in INPEN (his will ) in case he needs steroids in future; Would recommend IC ratio 1:8 and ISF 15  Consider glipizide versus prandin if another agent is needed when we review dexcom.  Reviewed patient's current insulin regimen. Clarified proper insulin dose and timing in relation to meals, etc. Insulin injection sites and proper rotation instructed.   - reviewed dose adjustments as well.   -Advised frequent self blood glucose monitoring. Patient encouraged to document glucose results and bring them to every clinic visit    -Hypoglycemia precautions discussed. Instructed on precautions before driving.    -Close adherence to lifestyle changes recommended.    -Periodic follow ups for eye evaluations, foot care suggested.  Not on statin due to allergy - consider PCSK9 in future

## 2024-05-29 ENCOUNTER — PATIENT MESSAGE (OUTPATIENT)
Dept: RHEUMATOLOGY | Facility: CLINIC | Age: 69
End: 2024-05-29
Payer: MEDICARE

## 2024-05-30 ENCOUNTER — TELEPHONE (OUTPATIENT)
Dept: HOME HEALTH SERVICES | Facility: CLINIC | Age: 69
End: 2024-05-30
Payer: MEDICARE

## 2024-05-30 DIAGNOSIS — E78.2 MIXED HYPERLIPIDEMIA: ICD-10-CM

## 2024-05-30 DIAGNOSIS — I10 ESSENTIAL HYPERTENSION: ICD-10-CM

## 2024-05-30 DIAGNOSIS — S31.30XD OPEN WOUND OF SCROTUM AND TESTES, SUBSEQUENT ENCOUNTER: Primary | ICD-10-CM

## 2024-06-01 ENCOUNTER — ON-DEMAND VIRTUAL (OUTPATIENT)
Dept: URGENT CARE | Facility: CLINIC | Age: 69
End: 2024-06-01
Payer: MEDICARE

## 2024-06-01 ENCOUNTER — HOSPITAL ENCOUNTER (EMERGENCY)
Facility: OTHER | Age: 69
Discharge: HOME | End: 2024-06-02
Attending: EMERGENCY MEDICINE
Payer: MEDICARE

## 2024-06-01 DIAGNOSIS — R19.7 DIARRHEA, UNSPECIFIED TYPE: Primary | ICD-10-CM

## 2024-06-01 DIAGNOSIS — K59.00 CONSTIPATION, UNSPECIFIED CONSTIPATION TYPE: ICD-10-CM

## 2024-06-01 DIAGNOSIS — E86.0 DEHYDRATION: ICD-10-CM

## 2024-06-01 PROCEDURE — 99213 OFFICE O/P EST LOW 20 MIN: CPT | Mod: 95,,, | Performed by: NURSE PRACTITIONER

## 2024-06-01 PROCEDURE — 99285 EMERGENCY DEPT VISIT HI MDM: CPT | Mod: 25

## 2024-06-01 NOTE — PROGRESS NOTES
Subjective:      Patient ID: Nithin Wagner is a 68 y.o. male.    Vitals:  vitals were not taken for this visit.     Chief Complaint: Diarrhea      Visit Type: TELE AUDIOVISUAL    Present with the patient at the time of consultation: TELEMED PRESENT WITH PATIENT: None        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    forehead    Stroke     TIA     Past Surgical History:   Procedure Laterality Date    CATARACT EXTRACTION W/  INTRAOCULAR LENS IMPLANT Right 3/28/2024    Procedure: EXTRACTION, CATARACT, WITH IOL INSERTION;  Surgeon: Hans Woodward MD;  Location: [facility] OR;  Service: Ophthalmology;  Laterality: Right;    CORONARY ANGIOGRAPHY N/A 3/10/2021    Procedure: ANGIOGRAM, CORONARY ARTERY - right radial;  Surgeon: Shemar Dempsey MD;  Location: [facility] CATH LAB;  Service: Cardiology;  Laterality: N/A;    CORONARY STENT PLACEMENT  03/10/2021    prox-mid RCA Marshal 4.5 x 26 mm, 4.5 x 12 mm    FIXATION KYPHOPLASTY N/A 4/22/2024    Procedure: KYPHOPLASTY;  Surgeon: Stefan Stern MD;  Location: [facility] OR;  Service: Pain Management;  Laterality: N/A;  NO PROPOFOL    INCISION AND DRAINAGE OF SCROTUM N/A 11/15/2022    Procedure: INCISION AND DRAINAGE, SCROTUM;  Surgeon: William Hatch MD;  Location: [facility] OR;  Service: Urology;  Laterality: N/A;    INCISION AND DRAINAGE OF SCROTUM N/A 11/17/2022    Procedure: INCISION AND DRAINAGE, SCROTUM;  Surgeon: William Hatch MD;  Location: [facility] OR;  Service: Urology;  Laterality: N/A;    LUNG LOBECTOMY Right 2008    RUL lobectomy after removal of atrial myxoma    MAGNETIC RESONANCE IMAGING  "N/A 4/30/2024    Procedure: MRI (Magnetic Resonance Imagine);  Surgeon: Peace Watts;  Location: [facility];  Service: Anesthesiology;  Laterality: N/A;    PLEURA BIOPSY      RESECTION OF ATRIAL MYXOMA  2007     Review of patient's allergies indicates:   Allergen Reactions    Enbrel [etanercept] Shortness Of Breath     CHF    Nsaids (non-steroidal anti-inflammatory drug) Other (See Comments)     hypertention  Other reaction(s): Other (See Comments)  hypertention  HTN  Patient states he tolerates anti inflammatory eye drops    Statins-hmg-coa reductase inhibitors Other (See Comments)     Heart arrythemias  Other reaction(s): Other (See Comments)  Heart arrythemias  Joint pain and cardiac arrythmias    Pcn [penicillins] Rash     Current Outpatient Medications on File Prior to Visit   Medication Sig Dispense Refill    aspirin (ECOTRIN) 81 MG EC tablet Take 1 tablet (81 mg total) by mouth once daily. 30 tablet 2    BD ULTRA-FINE RAHUL PEN NEEDLE 32 gauge x 5/32" Ndle To use 6 daily 400 each 3    diclofenac sodium (SOLARAZE) 3 % gel Apply to affected area three times a day. 100 g 5    digoxin (LANOXIN) 125 mcg tablet TAKE 1 TABLET BY MOUTH ONCE DAILY. 90 tablet 3    ergocalciferol (ERGOCALCIFEROL) 50,000 unit Cap Take 1 capsule (50,000 Units total) by mouth every 7 days. 12 capsule 3    EScitalopram oxalate (LEXAPRO) 20 MG tablet Take 20 mg by mouth every evening.      folic acid (FOLVITE) 1 MG tablet Take 1 tablet (1 mg total) by mouth once daily. 30 tablet 5    gabapentin (NEURONTIN) 400 MG capsule Take 1 capsule (400 mg total) by mouth 3 (three) times daily. 90 capsule 11    insulin pen,reusable,BT,aspart (INPEN, NOVOLOG OR FIASP, PINK) InPn To use with inpen cartridges 1 each 1    ketoconazole (NIZORAL) 2 % cream Apply to affected area DAILY 30 g 3    Lactobacillus rhamnosus GG (CULTURELLE) 10 billion cell capsule Take 1 capsule by mouth once daily. 30 capsule 2    lamiVUDine (EPIVIR) 150 MG Tab TAKE 1 TABLET BY MOUTH " EVERY DAY 30 tablet 23    LIDOcaine (LIDODERM) 5 % Place 1 patch onto the skin once daily. Remove & Discard patch within 12 hours or as directed by MD 30 patch 2    multivitamin (THERAGRAN) per tablet Take 1 tablet by mouth once daily.      nitroGLYCERIN (NITROSTAT) 0.4 MG SL tablet PLACE 1 TABLET UNDER THE TONGUE EVERY 5 MINUTES AS NEEDED FOR CHEST PAIN. 100 tablet 2    nystatin (MYCOSTATIN) powder Apply topically 4 (four) times daily. Apply to intertriginous areas as directed up to four times daily to reduce moisture. for 14 days 30 g 0    oxyCODONE (ROXICODONE) 10 mg Tab immediate release tablet Take 1 tablet by mouth every 8 hours as needed severe pain, medically necessary greater than 7 days; HOLD HYDROCODONE/ACETAMENOPHEN 30 tablet 0    polyethylene glycol (GLYCOLAX) 17 gram/dose powder Use cap to measure 17 grams, mix in liquid and take by mouth once daily. 510 g 2    PROAIR HFA 90 mcg/actuation inhaler       tirzepatide 7.5 mg/0.5 mL PnIj Inject 7.5 mg into the skin every 7 days. 4 Pen 11    walker Misc 1 Units by Misc.(Non-Drug; Combo Route) route daily as needed (Rollator Walker). 1 each 0     No current facility-administered medications on file prior to visit.     Family History   Problem Relation Name Age of Onset    Hodgkin's lymphoma Mother      Diabetes type II Mother      Kidney failure Father      Diabetes type I Father      Cancer Sister             Ohs Peq Odvv Intake    6/1/2024  4:49 PM CDT - Filed by Patient   What is your current physical address in the event of a medical emergency? [address]   Are you able to take your vital signs? No   Please attach any relevant images or files          68 male with c/o constipation and diarrhea. He states he was in rehab facility for broken vertebra . He states he was given antibiotic in rehab for alternating constipation. He states symptoms have continued. He states he is unable to eat. He states nauseated when eating or not. He describes  diarrhea as liquid and last night was explosive. He states he does take pain medication oxymorphone. He states trying to wean off .  He states stool is dark to brown. No gross blood.he states he is in pain and feels like needles in navel area.   He denies fever, no bloating. He states he does have chills and sweats but no fever. They did not evaluate his bowel issues while in rehab.        Constitution: Negative.   HENT: Negative.     Cardiovascular: Negative.    Eyes: Negative.    Respiratory: Negative.     Gastrointestinal:  Positive for constipation and diarrhea. Negative for bowel incontinence.   Endocrine: negative.   Genitourinary: Negative.  Negative for dysuria, flank pain, bladder incontinence and pelvic pain.   Musculoskeletal: Negative.  Negative for pain, abnormal ROM of joint and back pain.   Skin: Negative.    Allergic/Immunologic: Negative.    Neurological: Negative.    Hematologic/Lymphatic: Negative.    Psychiatric/Behavioral: Negative.          Objective:   The physical exam was conducted virtually.  LOCATION OF PATIENT home  Physical Exam   Constitutional: He is oriented to person, place, and time. He appears well-developed.   HENT:   Head: Normocephalic and atraumatic.   Ears:   Right Ear: Hearing, tympanic membrane and external ear normal.   Left Ear: Hearing, tympanic membrane and external ear normal.   Nose: Nose normal.   Mouth/Throat: Uvula is midline, oropharynx is clear and moist and mucous membranes are normal.   Eyes: Conjunctivae and EOM are normal. Pupils are equal, round, and reactive to light.   Neck: Neck supple.   Cardiovascular: Normal rate.   Pulmonary/Chest: Effort normal and breath sounds normal.   Musculoskeletal: Normal range of motion.         General: Normal range of motion.   Neurological: He is alert and oriented to person, place, and time.   Skin: Skin is warm.   Psychiatric: His behavior is normal. Thought content normal.   Nursing note and vitals  reviewed.      Assessment:     1. Diarrhea, unspecified type    2. Constipation, unspecified constipation type        Plan:     Advised in person visit   Follow up with your primary care provider if symptoms persist.  Go to the Emergency room for worsening of symptoms.     Diarrhea, unspecified type    Constipation, unspecified constipation type

## 2024-06-02 VITALS
HEART RATE: 91 BPM | DIASTOLIC BLOOD PRESSURE: 74 MMHG | HEIGHT: 71 IN | TEMPERATURE: 98 F | BODY MASS INDEX: 30.8 KG/M2 | RESPIRATION RATE: 17 BRPM | WEIGHT: 220 LBS | OXYGEN SATURATION: 95 % | SYSTOLIC BLOOD PRESSURE: 119 MMHG

## 2024-06-02 LAB
ALBUMIN SERPL BCP-MCNC: 2.3 G/DL (ref 3.5–5.2)
ALP SERPL-CCNC: 179 U/L (ref 55–135)
ALT SERPL W/O P-5'-P-CCNC: 25 U/L (ref 10–44)
ANION GAP SERPL CALC-SCNC: 14 MMOL/L (ref 8–16)
AST SERPL-CCNC: 50 U/L (ref 10–40)
BASOPHILS # BLD AUTO: 0.05 K/UL (ref 0–0.2)
BASOPHILS NFR BLD: 0.6 % (ref 0–1.9)
BILIRUB SERPL-MCNC: 0.5 MG/DL (ref 0.1–1)
BUN SERPL-MCNC: 9 MG/DL (ref 8–23)
C DIFF GDH STL QL: NEGATIVE
C DIFF TOX A+B STL QL IA: NEGATIVE
CALCIUM SERPL-MCNC: 8.2 MG/DL (ref 8.7–10.5)
CHLORIDE SERPL-SCNC: 99 MMOL/L (ref 95–110)
CO2 SERPL-SCNC: 18 MMOL/L (ref 23–29)
CREAT SERPL-MCNC: 0.8 MG/DL (ref 0.5–1.4)
CTP QC/QA: YES
CTP QC/QA: YES
DIFFERENTIAL METHOD BLD: ABNORMAL
EOSINOPHIL # BLD AUTO: 0.3 K/UL (ref 0–0.5)
EOSINOPHIL NFR BLD: 3 % (ref 0–8)
ERYTHROCYTE [DISTWIDTH] IN BLOOD BY AUTOMATED COUNT: 13 % (ref 11.5–14.5)
EST. GFR  (NO RACE VARIABLE): >60 ML/MIN/1.73 M^2
GLUCOSE SERPL-MCNC: 105 MG/DL (ref 70–110)
HCT VFR BLD AUTO: 45 % (ref 40–54)
HGB BLD-MCNC: 15 G/DL (ref 14–18)
IMM GRANULOCYTES # BLD AUTO: 0.06 K/UL (ref 0–0.04)
IMM GRANULOCYTES NFR BLD AUTO: 0.7 % (ref 0–0.5)
LACTATE SERPL-SCNC: 1.4 MMOL/L (ref 0.5–2.2)
LIPASE SERPL-CCNC: 7 U/L (ref 4–60)
LYMPHOCYTES # BLD AUTO: 2.4 K/UL (ref 1–4.8)
LYMPHOCYTES NFR BLD: 28.3 % (ref 18–48)
MAGNESIUM SERPL-MCNC: 1.7 MG/DL (ref 1.6–2.6)
MCH RBC QN AUTO: 31.1 PG (ref 27–31)
MCHC RBC AUTO-ENTMCNC: 33.3 G/DL (ref 32–36)
MCV RBC AUTO: 93 FL (ref 82–98)
MONOCYTES # BLD AUTO: 0.8 K/UL (ref 0.3–1)
MONOCYTES NFR BLD: 9.1 % (ref 4–15)
NEUTROPHILS # BLD AUTO: 4.9 K/UL (ref 1.8–7.7)
NEUTROPHILS NFR BLD: 58.3 % (ref 38–73)
NRBC BLD-RTO: 0 /100 WBC
PLATELET # BLD AUTO: 297 K/UL (ref 150–450)
PMV BLD AUTO: 11.5 FL (ref 9.2–12.9)
POC MOLECULAR INFLUENZA A AGN: NEGATIVE
POC MOLECULAR INFLUENZA B AGN: NEGATIVE
POTASSIUM SERPL-SCNC: 4.7 MMOL/L (ref 3.5–5.1)
PROT SERPL-MCNC: 6.7 G/DL (ref 6–8.4)
RBC # BLD AUTO: 4.83 M/UL (ref 4.6–6.2)
SARS-COV-2 RDRP RESP QL NAA+PROBE: NEGATIVE
SODIUM SERPL-SCNC: 131 MMOL/L (ref 136–145)
TROPONIN I SERPL DL<=0.01 NG/ML-MCNC: 0.02 NG/ML (ref 0–0.03)
WBC # BLD AUTO: 8.47 K/UL (ref 3.9–12.7)

## 2024-06-02 PROCEDURE — 84484 ASSAY OF TROPONIN QUANT: CPT | Performed by: EMERGENCY MEDICINE

## 2024-06-02 PROCEDURE — 96375 TX/PRO/DX INJ NEW DRUG ADDON: CPT

## 2024-06-02 PROCEDURE — 93010 ELECTROCARDIOGRAM REPORT: CPT | Mod: ,,, | Performed by: INTERNAL MEDICINE

## 2024-06-02 PROCEDURE — 83690 ASSAY OF LIPASE: CPT | Performed by: EMERGENCY MEDICINE

## 2024-06-02 PROCEDURE — 96374 THER/PROPH/DIAG INJ IV PUSH: CPT | Mod: 59

## 2024-06-02 PROCEDURE — 63600175 PHARM REV CODE 636 W HCPCS: Performed by: EMERGENCY MEDICINE

## 2024-06-02 PROCEDURE — 87635 SARS-COV-2 COVID-19 AMP PRB: CPT | Performed by: EMERGENCY MEDICINE

## 2024-06-02 PROCEDURE — 96361 HYDRATE IV INFUSION ADD-ON: CPT

## 2024-06-02 PROCEDURE — 36415 COLL VENOUS BLD VENIPUNCTURE: CPT | Performed by: EMERGENCY MEDICINE

## 2024-06-02 PROCEDURE — 25000003 PHARM REV CODE 250: Performed by: EMERGENCY MEDICINE

## 2024-06-02 PROCEDURE — 25500020 PHARM REV CODE 255: Performed by: EMERGENCY MEDICINE

## 2024-06-02 PROCEDURE — 93005 ELECTROCARDIOGRAM TRACING: CPT

## 2024-06-02 PROCEDURE — 85025 COMPLETE CBC W/AUTO DIFF WBC: CPT | Performed by: EMERGENCY MEDICINE

## 2024-06-02 PROCEDURE — 80053 COMPREHEN METABOLIC PANEL: CPT | Performed by: EMERGENCY MEDICINE

## 2024-06-02 PROCEDURE — 87209 SMEAR COMPLEX STAIN: CPT | Performed by: EMERGENCY MEDICINE

## 2024-06-02 PROCEDURE — 87449 NOS EACH ORGANISM AG IA: CPT | Performed by: EMERGENCY MEDICINE

## 2024-06-02 PROCEDURE — 83735 ASSAY OF MAGNESIUM: CPT | Performed by: EMERGENCY MEDICINE

## 2024-06-02 PROCEDURE — 83605 ASSAY OF LACTIC ACID: CPT | Performed by: EMERGENCY MEDICINE

## 2024-06-02 RX ORDER — HYDROMORPHONE HYDROCHLORIDE 1 MG/ML
1 INJECTION, SOLUTION INTRAMUSCULAR; INTRAVENOUS; SUBCUTANEOUS
Status: COMPLETED | OUTPATIENT
Start: 2024-06-02 | End: 2024-06-02

## 2024-06-02 RX ORDER — ONDANSETRON HYDROCHLORIDE 2 MG/ML
8 INJECTION, SOLUTION INTRAVENOUS ONCE
Status: COMPLETED | OUTPATIENT
Start: 2024-06-02 | End: 2024-06-02

## 2024-06-02 RX ADMIN — SODIUM CHLORIDE 1000 ML: 9 INJECTION, SOLUTION INTRAVENOUS at 12:06

## 2024-06-02 RX ADMIN — IOHEXOL 100 ML: 350 INJECTION, SOLUTION INTRAVENOUS at 12:06

## 2024-06-02 RX ADMIN — ONDANSETRON 8 MG: 2 INJECTION INTRAMUSCULAR; INTRAVENOUS at 12:06

## 2024-06-02 RX ADMIN — HYDROMORPHONE HYDROCHLORIDE 1 MG: 1 INJECTION, SOLUTION INTRAMUSCULAR; INTRAVENOUS; SUBCUTANEOUS at 12:06

## 2024-06-02 NOTE — ED NOTES
"Pt assisted to BSC by RN writer and ED tech without difficulty. Stool specimen collected. Pt tolerated BM well. No weakness or dizziness reported. Back pain "5/10." Pt ambulatory from BSC to ED stretcher with minimal assistance necessary. VSS. IV fluids infusing.    Pt assisted to lying down comfortably in bed. Call light remains in reach; denies further needs at this time.  "

## 2024-06-02 NOTE — ED PROVIDER NOTES
"  Chief complaint:  Diarrhea (C/o chronic back pain, diarrhea x 1 week, Heart rate 176)      HPI:  Nithin Wagner is a 68 y.o. male presenting with recent T11 and L3 kyphoplasty on April 22, 2024 with acute on chronic back pain and now diarrhea.  Patient had a recent admission for 10 days last month for similar presentation.  Patient reports now inability to tolerate fluids without vomiting or watery diarrhea.  In addition the patient has been trying to wean from opioids, though currently taking oxycodone 10 mg q.8 hours.  On last admission C diff negative, given IV opioids and transitioned to oral medications    ROS: As per HPI and below:    Constitutional: - fever, +chills.  Eyes: No discharge. No pain.  HENT: no nasal congestion, -anosmia; No sore throat.   Cardiovascular: - chest pain, no palpitations.  Respiratory: -cough, -shortness of breath.  Gastrointestinal: +nausea, + diarrhea, + abdominal pain, +vomiting.   Genitourinary: No hematuria, dysuria, urgency.  Musculoskeletal: + back pain.  Skin: No rashes, no lesions.  Neurological: -headache, no focal weakness.    Review of patient's allergies indicates:   Allergen Reactions    Enbrel [etanercept] Shortness Of Breath     CHF    Nsaids (non-steroidal anti-inflammatory drug) Other (See Comments)     hypertention  Other reaction(s): Other (See Comments)  hypertention  HTN  Patient states he tolerates anti inflammatory eye drops    Statins-hmg-coa reductase inhibitors Other (See Comments)     Heart arrythemias  Other reaction(s): Other (See Comments)  Heart arrythemias  Joint pain and cardiac arrythmias    Pcn [penicillins] Rash       No current facility-administered medications on file prior to encounter.     Current Outpatient Medications on File Prior to Encounter   Medication Sig Dispense Refill    aspirin (ECOTRIN) 81 MG EC tablet Take 1 tablet (81 mg total) by mouth once daily. 30 tablet 2    BD ULTRA-FINE RAHUL PEN NEEDLE 32 gauge x 5/32" Ndle To use 6 " daily 400 each 3    diclofenac sodium (SOLARAZE) 3 % gel Apply to affected area three times a day. 100 g 5    digoxin (LANOXIN) 125 mcg tablet TAKE 1 TABLET BY MOUTH ONCE DAILY. 90 tablet 3    ergocalciferol (ERGOCALCIFEROL) 50,000 unit Cap Take 1 capsule (50,000 Units total) by mouth every 7 days. 12 capsule 3    EScitalopram oxalate (LEXAPRO) 20 MG tablet Take 20 mg by mouth every evening.      folic acid (FOLVITE) 1 MG tablet Take 1 tablet (1 mg total) by mouth once daily. 30 tablet 5    gabapentin (NEURONTIN) 400 MG capsule Take 1 capsule (400 mg total) by mouth 3 (three) times daily. 90 capsule 11    insulin pen,reusable,BT,aspart (INPEN, NOVOLOG OR FIASP, PINK) InPn To use with inpen cartridges 1 each 1    ketoconazole (NIZORAL) 2 % cream Apply to affected area DAILY 30 g 3    Lactobacillus rhamnosus GG (CULTURELLE) 10 billion cell capsule Take 1 capsule by mouth once daily. 30 capsule 2    lamiVUDine (EPIVIR) 150 MG Tab TAKE 1 TABLET BY MOUTH EVERY DAY 30 tablet 23    LIDOcaine (LIDODERM) 5 % Place 1 patch onto the skin once daily. Remove & Discard patch within 12 hours or as directed by MD 30 patch 2    multivitamin (THERAGRAN) per tablet Take 1 tablet by mouth once daily.      nitroGLYCERIN (NITROSTAT) 0.4 MG SL tablet PLACE 1 TABLET UNDER THE TONGUE EVERY 5 MINUTES AS NEEDED FOR CHEST PAIN. 100 tablet 2    nystatin (MYCOSTATIN) powder Apply topically 4 (four) times daily. Apply to intertriginous areas as directed up to four times daily to reduce moisture. for 14 days 30 g 0    oxyCODONE (ROXICODONE) 10 mg Tab immediate release tablet Take 1 tablet by mouth every 8 hours as needed severe pain, medically necessary greater than 7 days; HOLD HYDROCODONE/ACETAMENOPHEN 30 tablet 0    polyethylene glycol (GLYCOLAX) 17 gram/dose powder Use cap to measure 17 grams, mix in liquid and take by mouth once daily. 510 g 2    PROAIR HFA 90 mcg/actuation inhaler       tirzepatide 7.5 mg/0.5 mL PnIj Inject 7.5 mg into the  skin every 7 days. 4 Pen 11    walker Misc 1 Units by Misc.(Non-Drug; Combo Route) route daily as needed (Rollator Walker). 1 each 0       PMH:  As per HPI and below:  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    forehead    Stroke     TIA     Past Surgical History:   Procedure Laterality Date    CATARACT EXTRACTION W/  INTRAOCULAR LENS IMPLANT Right 3/28/2024    Procedure: EXTRACTION, CATARACT, WITH IOL INSERTION;  Surgeon: Hans Woodward MD;  Location: [facility] OR;  Service: Ophthalmology;  Laterality: Right;    CORONARY ANGIOGRAPHY N/A 3/10/2021    Procedure: ANGIOGRAM, CORONARY ARTERY - right radial;  Surgeon: Shemar Dempsey MD;  Location: [facility] CATH LAB;  Service: Cardiology;  Laterality: N/A;    CORONARY STENT PLACEMENT  03/10/2021    prox-mid RCA Marshal 4.5 x 26 mm, 4.5 x 12 mm    FIXATION KYPHOPLASTY N/A 4/22/2024    Procedure: KYPHOPLASTY;  Surgeon: Stefan Stern MD;  Location: [facility] OR;  Service: Pain Management;  Laterality: N/A;  NO PROPOFOL    INCISION AND DRAINAGE OF SCROTUM N/A 11/15/2022    Procedure: INCISION AND DRAINAGE, SCROTUM;  Surgeon: William Hatch MD;  Location: [facility] OR;  Service: Urology;  Laterality: N/A;    INCISION AND DRAINAGE OF SCROTUM N/A 11/17/2022    Procedure: INCISION AND DRAINAGE, SCROTUM;  Surgeon: William Hatch MD;  Location: [facility] OR;  Service: Urology;  Laterality: N/A;    LUNG LOBECTOMY Right 2008    RUL lobectomy after removal of atrial myxoma    MAGNETIC RESONANCE IMAGING N/A 4/30/2024    Procedure: MRI (Magnetic Resonance Imagine);  Surgeon: Peace Watts;  Location: [facility];   Service: Anesthesiology;  Laterality: N/A;    PLEURA BIOPSY      RESECTION OF ATRIAL MYXOMA  2007       Social History     Socioeconomic History    Marital status: Single   Occupational History    Occupation: , respiratory therapist, founded [place]     Comment: Retired   Tobacco Use    Smoking status: Every Day     Current packs/day: 1.00     Average packs/day: 1 pack/day for 35.0 years (35.0 ttl pk-yrs)     Types: Cigarettes    Smokeless tobacco: Never   Substance and Sexual Activity    Alcohol use: Not Currently    Drug use: No    Sexual activity: Never     Social Determinants of Health     Financial Resource Strain: Low Risk  (4/30/2024)    Overall Financial Resource Strain (CARDIA)     Difficulty of Paying Living Expenses: Not very hard   Food Insecurity: No Food Insecurity (4/30/2024)    Hunger Vital Sign     Worried About Running Out of Food in the Last Year: Never true     Ran Out of Food in the Last Year: Never true   Transportation Needs: No Transportation Needs (4/30/2024)    PRAPARE - Transportation     Lack of Transportation (Medical): No     Lack of Transportation (Non-Medical): No   Physical Activity: Inactive (4/27/2024)    Exercise Vital Sign     Days of Exercise per Week: 0 days     Minutes of Exercise per Session: 0 min   Stress: Stress Concern Present (4/30/2024)    [employer] - Occupational Stress Questionnaire     Feeling of Stress : To some extent   Housing Stability: Low Risk  (4/30/2024)    Housing Stability Vital Sign     Unable to Pay for Housing in the Last Year: No     Homeless in the Last Year: No       Family History   Problem Relation Name Age of Onset    Hodgkin's lymphoma Mother      Diabetes type II Mother      Kidney failure Father      Diabetes type I Father      Cancer Sister         Physical Exam:    Vitals:    06/02/24 0126   BP:    Pulse: 91   Resp: 17   Temp:          General Appearance: The patient is alert, has no immediate  or signs of toxicity. No acute distress.    HEENT:  Extra ocular movements intact. No drainage. Pale conjunctiva, dry mm  Neck: No stridor.   Respiratory: No increased work of breathing, speaking in full sentences  CV: nml perfusion, no elevated JVD, tachycardic  Gastrointestinal:   No guarding, obese abdomen, tenderness to R mid abdomen w mild rebound  Neurological: Alert and appropriate, moving all extremities spontaneously  Skin: Warm and dry, no rashes.  Musculoskeletal: Extremities without notable edema, appropriate ROM    Psych: Normal affect, no evidence of psychosis    Labs Reviewed   CBC W/ AUTO DIFFERENTIAL - Abnormal; Notable for the following components:       Result Value    MCH 31.1 (*)     Immature Granulocytes 0.7 (*)     Immature Grans (Abs) 0.06 (*)     All other components within normal limits   COMPREHENSIVE METABOLIC PANEL - Abnormal; Notable for the following components:    Sodium 131 (*)     CO2 18 (*)     Calcium 8.2 (*)     Albumin 2.3 (*)     Alkaline Phosphatase 179 (*)     AST 50 (*)     All other components within normal limits   CLOSTRIDIUM DIFFICILE   MAGNESIUM   LIPASE   TROPONIN I   LACTIC ACID, PLASMA   STOOL EXAM-OVA,CYSTS,PARASITES   POCT INFLUENZA A/B MOLECULAR   SARS-COV-2 RDRP GENE         MDM:    68 y.o. male with multiple repeat presentations with similar complaints of intractable nausea, vomiting and back pain in setting of recent vertebral fractures sp kyphoplasty. Of note pt is on chronic opioids, previously tested for c diff on last admission and negative. Here pt initially tachycardic, appears dehydrated with R mid abdominal tenderness. Suspect possible colitis, c diff, electrolye derangements, dehydration, intrabdom abscess/perf. Will obtain imaging, admin antiemetics and reassess.     Medications   sodium chloride 0.9% bolus 1,000 mL 1,000 mL (1,000 mLs Intravenous New Bag 6/2/24 0013)   HYDROmorphone injection 1 mg (1 mg Intravenous Given 6/2/24 0022)   ondansetron  injection 8 mg (8 mg Intravenous Given 6/2/24 0015)   iohexoL (OMNIPAQUE 350) injection 100 mL (100 mLs Intravenous Given 6/2/24 0052)       ED Course as of 06/02/24 0138   Sun Jun 02, 2024 0032 EKG interpreted by myself, sinus tachycardia heart rate 102, left axis deviation, no STEMI,  [DM]   0137 Patient feeling improved, provided stool sample without issue.  Made aware of reassuring CT findings and amenable to plan of discharge home. [DM]      ED Course User Index  [DM] Katy Koenig MD       Diagnoses:    1. Diarrhea, unspecified type    2. Dehydration                 Katy Koenig MD  06/02/24 0138

## 2024-06-03 ENCOUNTER — OFFICE VISIT (OUTPATIENT)
Dept: RHEUMATOLOGY | Facility: CLINIC | Age: 69
End: 2024-06-03
Payer: MEDICARE

## 2024-06-03 DIAGNOSIS — Z79.899 IMMUNODEFICIENCY DUE TO DRUG THERAPY: ICD-10-CM

## 2024-06-03 DIAGNOSIS — D84.821 IMMUNODEFICIENCY DUE TO DRUG THERAPY: ICD-10-CM

## 2024-06-03 DIAGNOSIS — M06.9 RHEUMATOID ARTHRITIS FLARE: ICD-10-CM

## 2024-06-03 DIAGNOSIS — R19.7 DIARRHEA, UNSPECIFIED TYPE: Primary | ICD-10-CM

## 2024-06-03 LAB
OHS QRS DURATION: 98 MS
OHS QTC CALCULATION: 456 MS

## 2024-06-03 PROCEDURE — 99215 OFFICE O/P EST HI 40 MIN: CPT | Mod: 95,,, | Performed by: INTERNAL MEDICINE

## 2024-06-03 PROCEDURE — 1111F DSCHRG MED/CURRENT MED MERGE: CPT | Mod: CPTII,95,, | Performed by: INTERNAL MEDICINE

## 2024-06-03 PROCEDURE — 4010F ACE/ARB THERAPY RXD/TAKEN: CPT | Mod: CPTII,95,, | Performed by: INTERNAL MEDICINE

## 2024-06-03 PROCEDURE — 3051F HG A1C>EQUAL 7.0%<8.0%: CPT | Mod: CPTII,95,, | Performed by: INTERNAL MEDICINE

## 2024-06-03 PROCEDURE — 3066F NEPHROPATHY DOC TX: CPT | Mod: CPTII,95,, | Performed by: INTERNAL MEDICINE

## 2024-06-03 PROCEDURE — 3061F NEG MICROALBUMINURIA REV: CPT | Mod: CPTII,95,, | Performed by: INTERNAL MEDICINE

## 2024-06-03 NOTE — PROGRESS NOTES
Subjective:       Patient ID: Nithin Wagner is a 64 y.o. male.    Chief Complaint: No chief complaint on file.    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   He was 53 when he was diagnosed with RA. He reports he reports he was having pain and swelling in knees and ankles. He then he had involvement in hands and shoulders. He was put on MTX.  He reports that the methotrexate did help with joints but it caused confusion so he stopped it for 2 years. He was taken aleve which helped his pain for 2 years.Then he developed HTN on NSAIDS so he stopped them.  He was then put on hydrocodone 5 QID to control his pain. Reports that warm weather improves his pain. He is back on methotrexate. Today, it will be his 5th dose. He is taking folic acid 1 mg po qday.  He has never tried up to 8 pills once a week. He is taking prednisone 10mg po BID for 4 weeks. He still has some pain in ankles and knees.  He does get mild swelling in knees and ankles. He was previously on prednisone 40mg a day. He smokes 1 pack per day for 40 years    family hx:cousin: connective tissue disease    Interval history: He has been off steroids for 3 months. He is    s/p T11 and L3 kyphoplasty due to osteoporosis.  He went to  ED for dehydration and had CT scan  noted possible new lumbar fracture.    He was diagnosed with squamous cell carcinoma.  He is requesting oxydocone for severe pain and reports diarrhea for several weeks.      Past Medical History:   Diagnosis Date    Arthritis     Diabetes mellitus     Hyperlipidemia     Hypertension        Review of Systems      Review of Systems   Constitutional: Negative for fever.   Eyes: Negative for redness.   Respiratory: Negative for cough, hemoptysis, sputum production and shortness of breath.    Cardiovascular: Negative for chest pain.    Gastrointestinal: Negative for constipation and diarrhea.   Musculoskeletal: Positive for joint pain.   Skin: Negative for rash.   Neurological: Negative for headaches.   Endo/Heme/Allergies: Does not bruise/bleed easily.         Objective:   There were no vitals taken for this visit.     Physical Exam   Constitutional: He is oriented to person, place, and time and well-developed, well-nourished, and in no distress. No distress.   HENT:   Head: Normocephalic and atraumatic.   Right Ear: External ear normal.   Eyes: Conjunctivae and EOM are normal. Pupils are equal, round, and reactive to light. Right eye exhibits no discharge. Left eye exhibits no discharge. No scleral icterus.   Neck: Normal range of motion. Neck supple. No JVD present. No tracheal deviation present. No thyromegaly present.   Cardiovascular: Normal rate and regular rhythm.    Pulmonary/Chest: Effort normal and breath sounds normal. No stridor. No respiratory distress. He has no wheezes. He has no rales. He exhibits no tenderness.   Abdominal: Soft. Bowel sounds are normal. He exhibits no distension and no mass. There is no tenderness. There is no rebound and no guarding.   Lymphadenopathy:     He has no cervical adenopathy.   Neurological: He is alert and oriented to person, place, and time. He displays normal reflexes. No cranial nerve deficit. He exhibits normal muscle tone. Coordination normal.   Skin: Skin is warm and dry. No rash noted. He is not diaphoretic. No erythema. No pallor.     Musculoskeletal: He exhibits edema and tenderness. He exhibits no deformity.      labs: reviewed  Results for CECILIA MEAD (MRN [id number]) as of 4/14/2020 12:44   Ref. Range 4/7/2020 13:52   CCP Antibodies Latest Ref Range: <5.0 U/mL 336.9 (H)   Rheumatoid Factor Latest Ref Range: 0.0 - 15.0 IU/mL 456.0 (H)       No data to display     Assessment:   68  year old with PMH of  Type II DM, HTN, gout, right atrial myxoma s/p surgery in 2018,  CAD s/p 3 stents, PE,  right upper lobectomy secondary to right atrial myxoma, latent TB s/p INH x6 in 1982,Polycythemia, MARI , SQUAMOUS CELL carcinoma   here  For follow up of  rheumatoid arthritis. He was requiring high doses of steroid to function when we initially met.    Given that he has +hep B core antibody, he was started on lamivudine by hepatology but he had to stop due to GI upset.  I took him off  methotrexate give his baseline thrombocytopenia, mild LFT elevation, and prior lung surgery. MTX made him confused.  He was doing good on enbrel but developed CHF.      He has  not been able to function with less than prednisone 17.5mg a day. I TOLD HIM  I am concerned about long term side effects of steroids.  He tried  Orencia shots without improvement.  I was going to switch him to orencia infusions but insurance company did not pay for it. 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 His last shot of Kevzara was in September. He does not think Kevzara helped him off the steroids.  He underwent emergent excision and debridement of necrotic tissue on 11/15. Repeat washout performed 11/17/22.  He was then discharged to LT[facility].  Plan was to start him on Orencia infusions but he would like to hold off on biologics due to recent recurrent infections.   I discussed trial of SSZ or Arava but he preferred to wait.     I told him in the past if he continues on this path with high doses of steroids, he will have serious consequences like deadly infection, osteonecrosis, GI bleed, osteoporosis among others.      Plan at last visit was to start Actemra but he has not started it given severe diarrhea. I told him if his diarrhea does not improve he should go to ED.  Plan:       Problem List Items Addressed This Visit       None          #Seronegative RA:   Holding actemra given diarrhea  no MTX given MARI and baseline thrombocytopenia, poor lung reserve  Continue folic acid 1 mg po qday    -continue  lamivudine for Hep B core antibody  Follow up with  hepatology  No anti-tnf given CHF  No rosales kinase given history of PE      #diarrhea: GI consult.  Asked him to go to ED if no improvement      #compression fractures: has upcoming appt with endo.    The patient location is: home  The chief complaint leading to consultation is: joint pain    Visit type: audiovisual    Face to Face time with patient: 40   minutes of total time spent on the encounter, which includes face to face time and non-face to face time preparing to see the patient (eg, review of tests), Obtaining and/or reviewing separately obtained history, Documenting clinical information in the electronic or other health record, Independently interpreting results (not separately reported) and communicating results to the patient/family/caregiver, or Care coordination (not separately reported).         Each patient to whom he or she provides medical services by telemedicine is:  (1) informed of the relationship between the physician and patient and the respective role of any other health care provider with respect to management of the patient; and (2) notified that he or she may decline to receive medical services by telemedicine and may withdraw from such care at any time.    Notes:

## 2024-06-04 ENCOUNTER — TELEPHONE (OUTPATIENT)
Dept: ORTHOPEDICS | Facility: CLINIC | Age: 69
End: 2024-06-04
Payer: MEDICARE

## 2024-06-04 ENCOUNTER — OFFICE VISIT (OUTPATIENT)
Dept: PAIN MEDICINE | Facility: CLINIC | Age: 69
End: 2024-06-04
Payer: MEDICARE

## 2024-06-04 DIAGNOSIS — R19.7 DIARRHEA, UNSPECIFIED TYPE: ICD-10-CM

## 2024-06-04 PROCEDURE — 1111F DSCHRG MED/CURRENT MED MERGE: CPT | Mod: CPTII,95,,

## 2024-06-04 PROCEDURE — 3061F NEG MICROALBUMINURIA REV: CPT | Mod: CPTII,95,,

## 2024-06-04 PROCEDURE — 1160F RVW MEDS BY RX/DR IN RCRD: CPT | Mod: CPTII,95,,

## 2024-06-04 PROCEDURE — 99213 OFFICE O/P EST LOW 20 MIN: CPT | Mod: 95,,,

## 2024-06-04 PROCEDURE — 1159F MED LIST DOCD IN RCRD: CPT | Mod: CPTII,95,,

## 2024-06-04 PROCEDURE — 3051F HG A1C>EQUAL 7.0%<8.0%: CPT | Mod: CPTII,95,,

## 2024-06-04 PROCEDURE — 3066F NEPHROPATHY DOC TX: CPT | Mod: CPTII,95,,

## 2024-06-04 PROCEDURE — 4010F ACE/ARB THERAPY RXD/TAKEN: CPT | Mod: CPTII,95,,

## 2024-06-04 PROCEDURE — 1125F AMNT PAIN NOTED PAIN PRSNT: CPT | Mod: CPTII,95,,

## 2024-06-04 NOTE — PROGRESS NOTES
Chronic Pain-Tele-Medicine-Established Note (Follow up visit)        The patient location is: Home  The chief complaint leading to consultation is: lower back pain  Visit type: Virtual visit with synchronous audio and video  Total time spent with patient: 6 min  Each patient to whom he or she provides medical services by telemedicine is:  (1) informed of the relationship between the physician and patient and the respective role of any other health care provider with respect to management of the patient; and (2) notified that he or she may decline to receive medical services by telemedicine and may withdraw from such care at any time.       SUBJECTIVE:    INTERVAL HISTORY 6/4/2024:  Nithin Wagner returns to clinic virtually for follow-up of chronic pain.  He reports continued lower back pain.  He inquires about increasing his pain medication.  He is currently on Oxycodone from his PCP and has been receiving pain medication through his PCP for over the last 2 years. He states he will be out of pain medications tomorrow. He does have an acute compression fracture at L1. He has not followed up with Osmani Barry for bone health.  He states he does not want another Kyphoplasty for L1. He reports he has contacted an outside pain provider and his appointment with them is in 1 week. His pain today is 10/10.      INTERVAL HISTORY 5/20/2024:  Mr. Wagner returns for lower back pain. Current Pain level is 7.5-9/10.  He continues to take his chronic pain medicines every 4 hours. He has not started any physical therapy due to his pain.  Since his last visit, he presented to the ED due to intractable pain. He is interested in a different TLSO brace to give him some additional support.  He continues to recover in the skilled nursing facility.    Interval History 4/26/2024:  The patient returns to clinic today for follow up of back pain. He is s/p T11 and L3 kyphoplasty on 4/22/2024. He felt better on Tuesday but then had worsened  pain on Wednesday. He reports severe low back pain. He denies any radicular leg pain. His pain is worse with moving from sitting to standing. He cannot lay in a comfortable position. He has been to the ER twice this week. He also notes severe diarrhea. He denies any fever, chills, or drainage. His pain today is 10/10.    INTERVAL HISTORY 4/11/2024:  Nithin Wagner presents to the clinic for a follow-up appointment for chronic pain. Current pain intensity is 10/10.  Since the last visit, Nithin Wagner states:  he continues to have mid back and lower back pain. He also has sciatica pain, but feels the axial lower back pain tends to be worst.  It continues to affect his quality of life.  He is scheduled to have a kyphoplasty next Monday.    PRIOR HISTORY:   Nithin Wagner presents to the clinic for the evaluation of mid back, low back, and bilateral legs pain. The pain started 5 weeks ago following holding objects and adjusting to catch one from slipping and symptoms have been worsening.  He reports that he went to urgent care at that time and was given flexeril with no improvement. The pain is located in the mid back, low back area and radiates to the bilateral shooting leg pain. The thoracic pain is constant and continues to bother him.  He feels he is able to control the sciatica pain with oral medications.  The pain is described as sharp and shooting and is rated as 7/10. The pain is rated with a score of  4/10 on the BEST day and a score of 10/10 on the WORST day.  Symptoms interfere with daily activity and sleeping. The pain is exacerbated by moving around, trying to stand up, taking more than a few steps.  The pain is mitigated by laying down and medications. The patient reports 1.5 hours of uninterrupted sleep per night.    Patient denies urinary incontinence and bowel incontinence. He admits to weakness in his legs. He reports if he bends his left knee, he will collapse.  He was recently diagnosed with  osteoporosis. He has a history of rheumatoid arthritis, 3 stents, PE, right atrial myxoma, polycythemia. He has been on high doses of prednisone and has been trying to wean off of this, but his arthritic pain gets worse.    Physical Therapy/Home Exercise: no      Pain Medications:   - Norco 10-325mg (takes after 4-5 hours)   - Dilaudid 4mg (takes in the AM)   - gabapentin 300mg (doesn't take it - due to risk of side effects)     report:  Reviewed and consistent with medication use as prescribed.      Pain Procedures:   4/22/2024- T11 and L3 kyphoplasty      Imaging:   CT Abdomen and Pelvis 4/25/2024:  FINDINGS:  Images of the lower thorax are remarkable for bilateral bandlike atelectasis/scarring.     The liver is mildly hypoattenuating, may reflect sequela of contrast phase however correlation with LFTs as warranted to exclude changes of steatosis.  The spleen and gallbladder are unremarkable.  There is nodular thickening of the bilateral adrenal glands, nonspecific.  The stomach is decompressed without wall thickening.  The portal vein, splenic vein, SMV, celiac axis and SMA all are patent.  There is atrophic change of the pancreas without pancreatic ductal dilation.  No significant abdominal lymphadenopathy.     There is bilateral perinephric fat stranding, left greater than right.  The kidneys enhance somewhat asymmetrically noting some delayed excretion by the left kidney.  There is bilateral nonobstructive nephrolithiasis.  There is a cyst arising from the interpolar region of the right kidney measuring 1.7 cm.  The right ureter is unremarkable without calculi seen.  There are subcentimeter low attenuating lesions arising from the left kidney, too small for characterization.  The left ureter is unremarkable without calculi seen.  The urinary bladder is decompressed without wall thickening.  The prostate is not enlarged.     There is liquid stool within the rectum and scattered throughout the large bowel.   There are a few scattered colonic diverticula without inflammation.  The terminal ileum is unremarkable.  The appendix is unremarkable.  There is scattered fluid content within several small bowel loops, without significant distension.  There are several scattered shotty periaortic, pericaval, and mesenteric lymph nodes.  There is mild ectasia of the infrarenal abdominal aorta noting atherosclerotic calcification throughout the aorta and its branches.  No focal organized pelvic fluid collection.  There are bilateral fat containing inguinal hernias.     There is osteopenia.  There are degenerative changes of the spine.  There is vertebroplasty change.  There is endplate height loss of L1 noting associated vacuum disc phenomenon.  This is new since MRI 03/23/2024.  There is moderate spinal canal stenosis at that level.  No significant inguinal lymphadenopathy.     Impression:     This report was flagged in Epic as abnormal.     1. Liquid stool throughout the large bowel and within several small bowel loops, taken together, suggests diarrheal illness possibly in the setting of enteritis.  Correlation is advised.  2. Left perinephric fat stranding noting somewhat delayed excretion of contrast by the left kidney.  Correlation with urinalysis is recommended to exclude infection, differential would potentially include sequela of recently passed calculus.  3. Bilateral nonobstructive nephrolithiasis.  4. Possible hepatic steatosis, correlation with LFTs recommended.  5. Interval compression deformity involving the superior endplate of L1, new since prior MRI, correlation is advised.  6. Please see above for additional findings.    MRI LUMBAR SPINE WITHOUT CONTRAST     CLINICAL HISTORY:  Lumbar radiculopathy, no red flags, no prior management; Radiculopathy, lumbar region     TECHNIQUE:  Multiplanar, multisequence MR images were acquired from the thoracolumbar junction to the sacrum without the administration of contrast.      COMPARISON:  Radiographs 03/01/2024.     FINDINGS:  Lumbar spine alignment demonstrates dextroscoliosis with mild grade 1 retrolisthesis of L3 on L4.  No spondylolysis.  Prominent marrow edema at the inferior aspect of the L3 vertebral body with a superimposed linear hypointense band and associated irregularity and mild height loss of the inferior endplate.  Remaining vertebral body heights are well maintained.  No marrow signal abnormality to suggest an infiltrative process.     Multilevel degenerative disc space narrowing and desiccation most pronounced from L2-L3 through L4-L5.     Distal spinal cord demonstrates normal contour and signal intensity.  Cauda equina appears normal without findings to suggest arachnoiditis.  Conus medullaris terminates at L2.     Bilateral cortical renal cysts.  SI joints are symmetric.  Paraspinal musculature demonstrates normal bulk and signal intensity.     T12-L1: Prominent epidural fat.  No spinal canal stenosis.  No neural foraminal narrowing.     L1-L2: Left facet arthropathy and bilateral ligamentum flavum hypertrophy.  Prominent epidural fat.  No spinal canal stenosis.  No neural foraminal narrowing.     L2-L3: Circumferential disc bulge causes mass effect on the ventral thecal sac and lateral recesses and demonstrates a superimposed right foraminal/extraforaminal broad-based protrusion.  Bilateral facet arthropathy and bilateral ligamentum flavum hypertrophy.  Prominent epidural fat.  Findings contribute to mild right neural foraminal narrowing.  No spinal canal stenosis.     L3-L4: Circumferential disc bulge causes mass effect on the ventral thecal sac and lateral recesses and encroaches into the bilateral foraminal zones.  Bilateral facet arthropathy and bilateral ligamentum flavum hypertrophy.  Prominent epidural fat.  Findings contribute to complete effacement of the thecal sac and moderate left and mild right neural foraminal narrowing.     L4-L5: Circumferential  disc bulge causes mass effect on the ventral thecal sac and lateral recesses and encroaches into the bilateral foraminal zones.  Bilateral facet arthropathy and bilateral ligamentum flavum hypertrophy.  Prominent epidural fat.  Findings contribute to complete effacement of the thecal sac and severe left and mild right neural foraminal narrowing.     L5-S1: Posterior broad-based disc bulge.  Bilateral facet arthropathy.  Prominent epidural fat.  No spinal canal stenosis.  Mild right neural foraminal narrowing.     Impression:     1. Recent compression fracture of the L3 inferior endplate with mild height loss and moderate associated marrow edema.  No osseous retropulsion.  No epidural fluid collection.  2. Multilevel lumbar spondylosis and prominent epidural lipomatosis contributing to complete effacement of the thecal sac at L3-L4 and L4-L5 and multilevel neural foraminal narrowing most pronounced at L4-L5.  This report was flagged in Epic as abnormal.        Electronically signed by: Leeroy Bruce MD  Date:                                            03/24/2024  Time:                                           06:36    MRI THORACIC SPINE WITHOUT CONTRAST     CLINICAL HISTORY:  Mid-back pain;  Pain in thoracic spine     TECHNIQUE:  Multiplanar MR imaging of the thoracic spine was performed without the use of intravenous contrast     COMPARISON:  CT abdomen and pelvis 11/15/2022     FINDINGS:  Alignment: The thoracic spine demonstrates proper alignment.     Vertebra: Progressive height loss along the superior endplate of T11 now estimated at 60% (previously 20%).     Disc: Mild multilevel disc space narrowing.     Cord: The demonstrated portion of the spinal cord is normal in signal intensity at all levels with no indication of myelomalacia or cord edema.     Degenerative findings: Prominent posterior epidural fat deforming the thecal sac.  Complete effacement of the thecal sac beginning at T5-6 and extending through  T 7-8 as well as at T10-11.  Mild mass effect upon the thoracic spinal cord at these levels.  No focal disc abnormality.  No neural foraminal narrowing.     Focus of T2 signal hyperintensity right kidney shown to represent a cyst by prior CT.     Impression:     Progressive height loss at T11 compared to CT November 2022.  Linear STIR signal abnormality along the superior endplate suggesting recent height loss.     Prominent epidural lipomatosis resulting in complete effacement of the thecal sac from T5-6 through T7-8 and T10-11 with mild mass effect upon the thoracic spinal cord at these levels.  No cord signal abnormality identified.        Electronically signed by: Johana Reynoso  Date:                                            03/25/2024  Time:                                           08:24    XR LUMBAR SPINE AP AND LATERAL     CLINICAL HISTORY:  Vertebrogenic low back pain     FINDINGS:  LUMBAR SPINE TWO VIEWS: THERE IS A T11 COMPRESSION FRACTURE THAT MAY HAVE PROGRESSED WHEN COMPARED TO THE PRIOR STUDY.  THERE IS BASELINE DJD AND DISH.     Impression:     T11 COMPRESSION FRACTURE MAY HAVE PROGRESSED FROM THE PRIOR STUDY.        Electronically signed by: Jace Ching MD  Date:                                            03/01/2024  Time:                                           13:03    XR THORACIC SPINE AP LATERAL     CLINICAL HISTORY:  Vertebrogenic low back pain     FINDINGS:  Thoracic spine two views: There is mild DJD.  There is dish.  There is a T11 compression deformity which was seen on the CT study dated 11/15/2022.  This may have progressed when compared to the prior study dated 11/15/2022.     Impression:     T11 compression deformity may have progressed from the prior CT.     DJD and dish.        Electronically signed by: Jace Ching MD  Date:                                            03/01/2024  Time:                                           13:02    Pain Disability Index Review:       "5/20/2024    11:30 AM 4/26/2024     2:15 PM 3/14/2024    12:55 PM   Last 3 PDI Scores   Pain Disability Index (PDI) 50 56 40       Allergies:   Review of patient's allergies indicates:   Allergen Reactions    Enbrel [etanercept] Shortness Of Breath     CHF    Nsaids (non-steroidal anti-inflammatory drug) Other (See Comments)     hypertention  Other reaction(s): Other (See Comments)  hypertention  HTN  Patient states he tolerates anti inflammatory eye drops    Statins-hmg-coa reductase inhibitors Other (See Comments)     Heart arrythemias  Other reaction(s): Other (See Comments)  Heart arrythemias  Joint pain and cardiac arrythmias    Pcn [penicillins] Rash       Current Medications:   Current Outpatient Medications   Medication Sig Dispense Refill    aspirin (ECOTRIN) 81 MG EC tablet Take 1 tablet (81 mg total) by mouth once daily. 30 tablet 2    BD ULTRA-FINE RAHUL PEN NEEDLE 32 gauge x 5/32" Ndle To use 6 daily 400 each 3    diclofenac sodium (SOLARAZE) 3 % gel Apply to affected area three times a day. 100 g 5    digoxin (LANOXIN) 125 mcg tablet TAKE 1 TABLET BY MOUTH ONCE DAILY. 90 tablet 3    ergocalciferol (ERGOCALCIFEROL) 50,000 unit Cap Take 1 capsule (50,000 Units total) by mouth every 7 days. 12 capsule 3    EScitalopram oxalate (LEXAPRO) 20 MG tablet Take 20 mg by mouth every evening.      folic acid (FOLVITE) 1 MG tablet Take 1 tablet (1 mg total) by mouth once daily. 30 tablet 5    gabapentin (NEURONTIN) 400 MG capsule Take 1 capsule (400 mg total) by mouth 3 (three) times daily. 90 capsule 11    insulin pen,reusable,BT,aspart (INPEN, NOVOLOG OR FIASP, PINK) InPn To use with inpen cartridges 1 each 1    ketoconazole (NIZORAL) 2 % cream Apply to affected area DAILY 30 g 3    Lactobacillus rhamnosus GG (CULTURELLE) 10 billion cell capsule Take 1 capsule by mouth once daily. 30 capsule 2    lamiVUDine (EPIVIR) 150 MG Tab TAKE 1 TABLET BY MOUTH EVERY DAY 30 tablet 23    LIDOcaine (LIDODERM) 5 % Place 1 patch " onto the skin once daily. Remove & Discard patch within 12 hours or as directed by MD 30 patch 2    multivitamin (THERAGRAN) per tablet Take 1 tablet by mouth once daily.      nitroGLYCERIN (NITROSTAT) 0.4 MG SL tablet PLACE 1 TABLET UNDER THE TONGUE EVERY 5 MINUTES AS NEEDED FOR CHEST PAIN. 100 tablet 2    nystatin (MYCOSTATIN) powder Apply topically 4 (four) times daily. Apply to intertriginous areas as directed up to four times daily to reduce moisture. for 14 days 30 g 0    oxyCODONE (ROXICODONE) 10 mg Tab immediate release tablet Take 1 tablet by mouth every 8 hours as needed severe pain, medically necessary greater than 7 days; HOLD HYDROCODONE/ACETAMENOPHEN 30 tablet 0    polyethylene glycol (GLYCOLAX) 17 gram/dose powder Use cap to measure 17 grams, mix in liquid and take by mouth once daily. 510 g 2    PROAIR HFA 90 mcg/actuation inhaler       tirzepatide 7.5 mg/0.5 mL PnIj Inject 7.5 mg into the skin every 7 days. 4 Pen 11    walker Misc 1 Units by Misc.(Non-Drug; Combo Route) route daily as needed (Rollator Walker). 1 each 0     No current facility-administered medications for this visit.       REVIEW OF SYSTEMS:  GENERAL:  current fevers or chills, recent use of antibiotics denies.  HEENT:  History of migraines/headaches: denies, History of major throat surgery: denies  RESPIRATORY:  History of home oxygen or pulmonary hypertension/severe breathing dysfunction: reports right pneumonectomy and upper lobectomy  CARDIOVASCULAR:  Hx of palpitations/rhythm problems: denies Hx of Heart Attacks/Surgery: reports prior 3 stents, history right atrial myxoma  GI:  Recent abdominal discomfort or recent change in bowel habits reports abdominal pain (also has constipation)  MUSCULOSKELETAL:  See HPI.  SKIN:  unhealed wounds or rashes: reports scrotal yeast infection and skin tears related to prednisone  PSYCH: major psychiatric history or recent psychosocial stressors reports occasional suicidal  thoughts  HEMATOLOGY/LYMPHOLOGY:  Hx of prolonged bleeding, Hx of Blood thinner usage: reports ASA 81mg ; reason: stents  NEURO:   history of seizures, strokes, chronic/old weakness (such as paralysis or paresis of any body part): reports TIA during period of myxoma (no deficits)  All other reviewed and negative other than HPI.    Past Medical History:  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    forehead    Stroke     TIA       Past Surgical History:  Past Surgical History:   Procedure Laterality Date    CATARACT EXTRACTION W/  INTRAOCULAR LENS IMPLANT Right 3/28/2024    Procedure: EXTRACTION, CATARACT, WITH IOL INSERTION;  Surgeon: Hans Woodward MD;  Location: [facility] OR;  Service: Ophthalmology;  Laterality: Right;    CORONARY ANGIOGRAPHY N/A 3/10/2021    Procedure: ANGIOGRAM, CORONARY ARTERY - right radial;  Surgeon: Shemar Dempsey MD;  Location: [facility] CATH LAB;  Service: Cardiology;  Laterality: N/A;    CORONARY STENT PLACEMENT  03/10/2021    prox-mid RCA Marshal 4.5 x 26 mm, 4.5 x 12 mm    FIXATION KYPHOPLASTY N/A 4/22/2024    Procedure: KYPHOPLASTY;  Surgeon: Stefan Stern MD;  Location: [facility] OR;  Service: Pain Management;  Laterality: N/A;  NO PROPOFOL    INCISION AND DRAINAGE OF SCROTUM N/A 11/15/2022    Procedure: INCISION AND DRAINAGE, SCROTUM;  Surgeon: William Hatch MD;  Location: [facility] OR;  Service: Urology;  Laterality: N/A;    INCISION AND DRAINAGE OF SCROTUM N/A 11/17/2022    Procedure: INCISION AND DRAINAGE, SCROTUM;  Surgeon: William Hatch MD;  Location: [facility];  Service: Urology;  Laterality: N/A;     LUNG LOBECTOMY Right 2008    RUL lobectomy after removal of atrial myxoma    MAGNETIC RESONANCE IMAGING N/A 4/30/2024    Procedure: MRI (Magnetic Resonance Imagine);  Surgeon: Peace Watts;  Location: [facility];  Service: Anesthesiology;  Laterality: N/A;    PLEURA BIOPSY      RESECTION OF ATRIAL MYXOMA  2007       Family History:  Family History   Problem Relation Name Age of Onset    Hodgkin's lymphoma Mother      Diabetes type II Mother      Kidney failure Father      Diabetes type I Father      Cancer Sister         Social History:  Social History     Socioeconomic History    Marital status: Single   Occupational History    Occupation: , respiratory therapist, founded [employer]     Comment: Retired   Tobacco Use    Smoking status: Every Day     Current packs/day: 1.00     Average packs/day: 1 pack/day for 35.0 years (35.0 ttl pk-yrs)     Types: Cigarettes    Smokeless tobacco: Never   Substance and Sexual Activity    Alcohol use: Not Currently    Drug use: No    Sexual activity: Never     Social Determinants of Health     Financial Resource Strain: Low Risk  (4/30/2024)    Overall Financial Resource Strain (CARDIA)     Difficulty of Paying Living Expenses: Not very hard   Food Insecurity: No Food Insecurity (4/30/2024)    Hunger Vital Sign     Worried About Running Out of Food in the Last Year: Never true     Ran Out of Food in the Last Year: Never true   Transportation Needs: No Transportation Needs (4/30/2024)    PRAPARE - Transportation     Lack of Transportation (Medical): No     Lack of Transportation (Non-Medical): No   Physical Activity: Inactive (4/27/2024)    Exercise Vital Sign     Days of Exercise per Week: 0 days     Minutes of Exercise per Session: 0 min   Stress: Stress Concern Present (4/30/2024)    [employer] - Occupational Stress Questionnaire     Feeling of Stress : To some extent   Housing Stability: Low Risk  (4/30/2024)    Housing Stability  Vital Sign     Unable to Pay for Housing in the Last Year: No     Homeless in the Last Year: No       OBJECTIVE:     Vitals:    06/04/24 1459   PainSc: 10-Worst pain ever   PainLoc: Back         VIRTUAL PHYSICAL EXAMINATION:    GENERAL APPEARANCE: Well appearing, in no acute distress.  PSYCH:  Mood and affect appropriate.  SKIN: Skin color, texture, turgor normal, no rashes or lesions to visible areas.  HEAD/FACE:  Normocephalic, atraumatic.  PULM: Bilateral chest rise. No apparent respiratory distress.        Previous PHYSICAL EXAMINATION 5/20/2024:  General appearance: Well appearing, in no acute distress, alert and appropriately communicative.  Psych:  Mood and affect appropriate.  Skin: bruising noted to BUE.  Head/face:  Atraumatic, normocephalic.  Cor: regular rate  Pulm: Symmetric chest rise, no respiratory distress noted.   Back: Straight leg raising in the sitting position is negative to radicular pain. There is pain with palpation over thoracic and lumbar paraspinals and spinous process. Exam limited due to pain.  Extremities: No deformities. No significant open wounds. No major amputations.  Musculoskeletal:  Bilateral lower extremity strength is 4/5.  No atrophy or tone abnormalities are noted.  Neuro: No loss of sensation noted.   Gait: wheelchair.     CMP  Sodium   Date Value Ref Range Status   06/02/2024 131 (L) 136 - 145 mmol/L Final     Potassium   Date Value Ref Range Status   06/02/2024 4.7 3.5 - 5.1 mmol/L Final     Comment:     Specimen moderately hemolyzed     Chloride   Date Value Ref Range Status   06/02/2024 99 95 - 110 mmol/L Final     CO2   Date Value Ref Range Status   06/02/2024 18 (L) 23 - 29 mmol/L Final     Carbon Monoxide, Blood   Date Value Ref Range Status   11/04/2021 11 (H) % Final     Comment:     -------------------REFERENCE VALUE--------------------------  0-2 Normal (Non-smoker) , < or = 9 Normal (Smoker), > or   = 20 (Toxic concentration)    Test Performed by:  [facility]  [address] [place] [address]  : Demetrius Saldana M.D. Ph.D.; CLIA# [id number]       Glucose   Date Value Ref Range Status   06/02/2024 105 70 - 110 mg/dL Final     BUN   Date Value Ref Range Status   06/02/2024 9 8 - 23 mg/dL Final     Creatinine   Date Value Ref Range Status   06/02/2024 0.8 0.5 - 1.4 mg/dL Final     Calcium   Date Value Ref Range Status   06/02/2024 8.2 (L) 8.7 - 10.5 mg/dL Final     Total Protein   Date Value Ref Range Status   06/02/2024 6.7 6.0 - 8.4 g/dL Final     Albumin   Date Value Ref Range Status   06/02/2024 2.3 (L) 3.5 - 5.2 g/dL Final   06/21/2022 4.1 3.6 - 5.1 g/dL Final     Total Bilirubin   Date Value Ref Range Status   06/02/2024 0.5 0.1 - 1.0 mg/dL Final     Comment:     For infants and newborns, interpretation of results should be based  on gestational age, weight and in agreement with clinical  observations.    Premature Infant recommended reference ranges:  Up to 24 hours.............<8.0 mg/dL  Up to 48 hours............<12.0 mg/dL  3-5 days..................<15.0 mg/dL  6-29 days.................<15.0 mg/dL       Alkaline Phosphatase   Date Value Ref Range Status   06/02/2024 179 (H) 55 - 135 U/L Final     AST   Date Value Ref Range Status   06/02/2024 50 (H) 10 - 40 U/L Final     ALT   Date Value Ref Range Status   06/02/2024 25 10 - 44 U/L Final     Anion Gap   Date Value Ref Range Status   06/02/2024 14 8 - 16 mmol/L Final     eGFR   Date Value Ref Range Status   06/02/2024 >60 >60 mL/min/1.73 m^2 Final     ASSESSMENT: 68 y.o. year old male with mid back and lower back pain, consistent with:     1. Diarrhea, unspecified type  Ambulatory referral/consult to Pain Clinic        IMPRESSION: Nithin Wagner presents today for mid back and lower back pain. History and physical exam are consistent with axial/vertebrogenic lower back pain.  My independent interpretation of the imaging is consistent with L1  compression fracture.  He had uncertain relief from his kyphoplasties, but unfortunately suffered an L1 compression fracture while recovering from his procedure.  At this point, he needs workup ASAP regarding his osteporosis considering his high risk of additional fractures. We can consider L1 kyphoplasty, however he feels his pain is starting to improve, so we can wait on this as well.    PLAN:   - Previous imaging was reviewed and discussed with the patient today.   - Reviewed   - We discussed L1 kyphoplasty-he defers this treatment at this time  - Advised to reach out to PCP to continue currently prescribed medications until follow-up with outside pain provider.  - Long discussion on how our office and physicians aim to help treat underlying causes of pain with physical therapy and interventions if needed.  - I cannot prescribe opioid medications.   - He states if he does not have a new prescription for pain medication, he will have to go to the ED.  - He has not seen, Osmani Barry, for evaluation for bone fracture prevention; advised to follow-up to establish care.   - RTC PRN      The above plan and management options were discussed at length with patient. Patient is in agreement with the above and verbalized understanding.    Melissa Rojas, BASIA   06/04/2024

## 2024-06-06 ENCOUNTER — TELEPHONE (OUTPATIENT)
Dept: ORTHOPEDICS | Facility: CLINIC | Age: 69
End: 2024-06-06
Payer: MEDICARE

## 2024-06-06 ENCOUNTER — HOSPITAL ENCOUNTER (EMERGENCY)
Facility: OTHER | Age: 69
Discharge: HOME | End: 2024-06-06
Attending: INTERNAL MEDICINE
Payer: MEDICARE

## 2024-06-06 VITALS
WEIGHT: 220 LBS | HEIGHT: 71 IN | DIASTOLIC BLOOD PRESSURE: 75 MMHG | TEMPERATURE: 98 F | RESPIRATION RATE: 17 BRPM | SYSTOLIC BLOOD PRESSURE: 122 MMHG | HEART RATE: 89 BPM | BODY MASS INDEX: 30.8 KG/M2 | OXYGEN SATURATION: 96 %

## 2024-06-06 DIAGNOSIS — R11.2 NAUSEA AND VOMITING, UNSPECIFIED VOMITING TYPE: Primary | ICD-10-CM

## 2024-06-06 DIAGNOSIS — M54.9 BACK PAIN, UNSPECIFIED BACK LOCATION, UNSPECIFIED BACK PAIN LATERALITY, UNSPECIFIED CHRONICITY: ICD-10-CM

## 2024-06-06 LAB — O+P STL MICRO: NORMAL

## 2024-06-06 PROCEDURE — 25000003 PHARM REV CODE 250: Performed by: INTERNAL MEDICINE

## 2024-06-06 PROCEDURE — 99283 EMERGENCY DEPT VISIT LOW MDM: CPT

## 2024-06-06 RX ORDER — HYDROCODONE BITARTRATE AND ACETAMINOPHEN 10; 325 MG/1; MG/1
1 TABLET ORAL
Status: COMPLETED | OUTPATIENT
Start: 2024-06-06 | End: 2024-06-06

## 2024-06-06 RX ADMIN — HYDROCODONE BITARTRATE AND ACETAMINOPHEN 1 TABLET: 10; 325 TABLET ORAL at 02:06

## 2024-06-06 NOTE — ED TRIAGE NOTES
Nithin Wagner, an 68 y.o. male presents to the ED c/o abdominal pain x 6 days and nausea and vomiting x 3 weeks. Patient also c/o low back pain x 2 months.    Patient is A&Ox4. Denies any other complaints. ED workup in progress. Safety measures in place; side rails up x2. Call light within pt reach. Plan of care ongoing.    Chief Complaint   Patient presents with    Nausea    Vomiting     N/V x 3 weeks, unable to keep anything down, also c/o back pain from fx 1 month ago, ran out of pain meds     Review of patient's allergies indicates:   Allergen Reactions    Enbrel [etanercept] Shortness Of Breath     CHF    Nsaids (non-steroidal anti-inflammatory drug) Other (See Comments)     hypertention  Other reaction(s): Other (See Comments)  hypertention  HTN  Patient states he tolerates anti inflammatory eye drops    Statins-hmg-coa reductase inhibitors Other (See Comments)     Heart arrythemias  Other reaction(s): Other (See Comments)  Heart arrythemias  Joint pain and cardiac arrythmias    Pcn [penicillins] Rash     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    forehead    Stroke     TIA

## 2024-06-06 NOTE — DISCHARGE INSTRUCTIONS
Diagnosis:   1. Nausea and vomiting, unspecified vomiting type    2. Back pain, unspecified back location, unspecified back pain laterality, unspecified chronicity        Tests you had showed:   Labs Reviewed - No data to display   No orders to display       Treatments you received were:   Medications   HYDROcodone-acetaminophen  mg per tablet 1 tablet (1 tablet Oral Given 6/6/24 0207)       Home Care Instructions:  - Medications: Continue taking your home medications as prescribed    Follow-Up Plan:  - Follow-up with: Primary care doctor within 1-2  days  - Additional testing and/or evaluation will be directed by your primary doctor    Return to the Emergency Department for symptoms including but not limited to: worsening symptoms, severe back pain, shortness of breath or chest pain, vomiting with inability to hold down fluids, blood in vomit or poop, fevers greater than 100.4°F, passing out/fainting/unconsciousness, or other concerning symptoms.     Future Appointments   Date Time Provider Department Center   6/10/2024 12:30 PM [facility] OIC-XRAY [facility] XRAY [facility]   6/10/2024  1:30 PM Ana Nance PA-C [facility] NEUROS8 [facility]   6/18/2024  2:30 PM Korin Alcala MD [facility] RHEUM [facility]   6/19/2024 10:30 AM Ivone Uriarte PA-C [facility] SPINE [facility]   6/19/2024  2:00 PM Lalitha Stroud RN, CDE [facility]   7/8/2024  3:30 PM Nithin Garza MD [facility] HEPAT [facility]   9/30/2024  3:00 PM Izabel Marc NP [facility] ENDODIA [facility]

## 2024-06-06 NOTE — ED PROVIDER NOTES
Encounter date: 1:40 AM 06/06/2024    Source of History   Patient/ family    Chief Complaint   Pt presents with:   Nausea and Vomiting (N/V x 3 weeks, unable to keep anything down, also c/o back pain from fx 1 month ago, ran out of pain meds)      History Of Present Illness   Nithin Wagner is a 68 y.o. male with hypertension, diastolic heart failure diabetes, CAD, hyperlipidemia history of nephrolithiasis constipation who presents to the ED with abdominal pain beginning one month ago. Patient reports he has experienced associated nausea, vomiting, and decreased appetite. He states he had one episode of diarrhea and then took imodium until it stopped. He notes these symptoms began around the same time he ran out of his pain medication. Patient reports he was on OxyContin for his back pain that resulted from fracturing 3 vertebrae in an accident. Patient states he has been to multiple physicians, including his PCP and a chronic pain specialist but has had difficulty finding someone to refill his prescription. Patients chart shows that her last received 10 doses of the OxyCotin on 5/24/2024. Patient denies bladder incontinence.  Patient denies chest pain, shortness of breath, recent falls or trauma, lower extremity weakness, saddle anesthesia or bowel or bladder incontinence.  He has had no recent fevers.    This is the extent to the patients complaints today here in the emergency department.  Past History     Review of patient's allergies indicates:   Allergen Reactions    Enbrel [etanercept] Shortness Of Breath     CHF    Nsaids (non-steroidal anti-inflammatory drug) Other (See Comments)     hypertention  Other reaction(s): Other (See Comments)  hypertention  HTN  Patient states he tolerates anti inflammatory eye drops    Statins-hmg-coa reductase inhibitors Other (See Comments)     Heart arrythemias  Other reaction(s): Other (See Comments)  Heart arrythemias  Joint pain and cardiac arrythmias    Pcn [penicillins]  "Rash       No current facility-administered medications on file prior to encounter.     Current Outpatient Medications on File Prior to Encounter   Medication Sig Dispense Refill    aspirin (ECOTRIN) 81 MG EC tablet Take 1 tablet (81 mg total) by mouth once daily. 30 tablet 2    BD ULTRA-FINE RAHUL PEN NEEDLE 32 gauge x 5/32" Ndle To use 6 daily 400 each 3    diclofenac sodium (SOLARAZE) 3 % gel Apply to affected area three times a day. 100 g 5    digoxin (LANOXIN) 125 mcg tablet TAKE 1 TABLET BY MOUTH ONCE DAILY. 90 tablet 3    ergocalciferol (ERGOCALCIFEROL) 50,000 unit Cap Take 1 capsule (50,000 Units total) by mouth every 7 days. 12 capsule 3    EScitalopram oxalate (LEXAPRO) 20 MG tablet Take 20 mg by mouth every evening.      folic acid (FOLVITE) 1 MG tablet Take 1 tablet (1 mg total) by mouth once daily. 30 tablet 5    gabapentin (NEURONTIN) 400 MG capsule Take 1 capsule (400 mg total) by mouth 3 (three) times daily. 90 capsule 11    insulin pen,reusable,BT,aspart (INPEN, NOVOLOG OR FIASP, PINK) InPn To use with inpen cartridges 1 each 1    ketoconazole (NIZORAL) 2 % cream Apply to affected area DAILY 30 g 3    Lactobacillus rhamnosus GG (CULTURELLE) 10 billion cell capsule Take 1 capsule by mouth once daily. 30 capsule 2    lamiVUDine (EPIVIR) 150 MG Tab TAKE 1 TABLET BY MOUTH EVERY DAY 30 tablet 23    LIDOcaine (LIDODERM) 5 % Place 1 patch onto the skin once daily. Remove & Discard patch within 12 hours or as directed by MD 30 patch 2    multivitamin (THERAGRAN) per tablet Take 1 tablet by mouth once daily.      nitroGLYCERIN (NITROSTAT) 0.4 MG SL tablet PLACE 1 TABLET UNDER THE TONGUE EVERY 5 MINUTES AS NEEDED FOR CHEST PAIN. 100 tablet 2    nystatin (MYCOSTATIN) powder Apply topically 4 (four) times daily. Apply to intertriginous areas as directed up to four times daily to reduce moisture. for 14 days 30 g 0    oxyCODONE (ROXICODONE) 10 mg Tab immediate release tablet Take 1 tablet by mouth every 8 hours " as needed severe pain, medically necessary greater than 7 days; HOLD HYDROCODONE/ACETAMENOPHEN 30 tablet 0    polyethylene glycol (GLYCOLAX) 17 gram/dose powder Use cap to measure 17 grams, mix in liquid and take by mouth once daily. 510 g 2    PROAIR HFA 90 mcg/actuation inhaler       tirzepatide 7.5 mg/0.5 mL PnIj Inject 7.5 mg into the skin every 7 days. 4 Pen 11    walker Misc 1 Units by Misc.(Non-Drug; Combo Route) route daily as needed (Rollator Walker). 1 each 0       As per HPI and below:  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    forehead    Stroke     TIA     Past Surgical History:   Procedure Laterality Date    CATARACT EXTRACTION W/  INTRAOCULAR LENS IMPLANT Right 3/28/2024    Procedure: EXTRACTION, CATARACT, WITH IOL INSERTION;  Surgeon: Hans Woodward MD;  Location[facility] OR;  Service: Ophthalmology;  Laterality: Right;    CORONARY ANGIOGRAPHY N/A 3/10/2021    Procedure: ANGIOGRAM, CORONARY ARTERY - right radial;  Surgeon: Shemar Dempsey MD;  Location: [facility] CATH LAB;  Service: Cardiology;  Laterality: N/A;    CORONARY STENT PLACEMENT  03/10/2021    prox-mid RCA Marshal 4.5 x 26 mm, 4.5 x 12 mm    FIXATION KYPHOPLASTY N/A 4/22/2024    Procedure: KYPHOPLASTY;  Surgeon: Stefan Stern MD;  Location: [facility] OR;  Service: Pain Management;  Laterality: N/A;  NO PROPOFOL    INCISION AND DRAINAGE OF SCROTUM N/A 11/15/2022    Procedure: INCISION AND DRAINAGE, SCROTUM;  Surgeon: William Hatch MD;  Location: [facility] OR;  Service: Urology;  Laterality: N/A;    INCISION AND DRAINAGE OF SCROTUM N/A 11/17/2022    Procedure:  INCISION AND DRAINAGE, SCROTUM;  Surgeon: William Hatch MD;  Location: [facility];  Service: Urology;  Laterality: N/A;    LUNG LOBECTOMY Right 2008    RUL lobectomy after removal of atrial myxoma    MAGNETIC RESONANCE IMAGING N/A 4/30/2024    Procedure: MRI (Magnetic Resonance Imagine);  Surgeon: Peace Watts;  Location: [facility];  Service: Anesthesiology;  Laterality: N/A;    PLEURA BIOPSY      RESECTION OF ATRIAL MYXOMA  2007       Social History     Socioeconomic History    Marital status: Single   Occupational History    Occupation: , respiratory therapist, founded [employer]     Comment: Retired   Tobacco Use    Smoking status: Every Day     Current packs/day: 1.00     Average packs/day: 1 pack/day for 35.0 years (35.0 ttl pk-yrs)     Types: Cigarettes    Smokeless tobacco: Never   Substance and Sexual Activity    Alcohol use: Not Currently    Drug use: No    Sexual activity: Never     Social Determinants of Health     Financial Resource Strain: Low Risk  (4/30/2024)    Overall Financial Resource Strain (CARDIA)     Difficulty of Paying Living Expenses: Not very hard   Food Insecurity: No Food Insecurity (4/30/2024)    Hunger Vital Sign     Worried About Running Out of Food in the Last Year: Never true     Ran Out of Food in the Last Year: Never true   Transportation Needs: No Transportation Needs (4/30/2024)    PRAPARE - Transportation     Lack of Transportation (Medical): No     Lack of Transportation (Non-Medical): No   Physical Activity: Inactive (4/27/2024)    Exercise Vital Sign     Days of Exercise per Week: 0 days     Minutes of Exercise per Session: 0 min   Stress: Stress Concern Present (4/30/2024)    [employer] [place] [employer] - Occupational Stress Questionnaire     Feeling of Stress : To some extent   Housing Stability: Low Risk  (4/30/2024)    Housing Stability Vital Sign     Unable to Pay for Housing in the Last Year: No     Homeless in the Last Year: No  "      Family History   Problem Relation Name Age of Onset    Hodgkin's lymphoma Mother      Diabetes type II Mother      Kidney failure Father      Diabetes type I Father      Cancer Sister         Physical Exam     Vitals:    06/06/24 0134 06/06/24 0207 06/06/24 0235 06/06/24 0245   BP: 118/81  122/75    BP Location: Left arm  Left arm    Patient Position: Sitting  Lying    Pulse: (!) 118  89    Resp: 15 16 18 17   Temp: 98.2 °F (36.8 °C)  98.3 °F (36.8 °C)    TempSrc: Oral  Oral    SpO2: 95%  (!) 94% 96%   Weight: 99.8 kg (220 lb)      Height: 5' 11" (1.803 m)        Physical Exam:   Nursing note and vitals reviewed.  Appearance:  Chronically ill, nontoxic appearing elderly male in no acute respiratory distress.  Making purposeful movements.  Speaking in full sentences.  Skin: No obvious rashes seen.  Good turgor.  No abrasions.  No ecchymoses.  Eyes: No conjunctival injection. EOMI, PERRL.  Head: NC/AT  Chest: CTAB. No increased work of breathing, bilateral chest rise.  Cardiovascular: Regular rate and rhythm.  Normal equal bilateral radial pulses.  Abdomen: Soft.  Not distended.  Nontender.  No guarding.  No rebound. No Masses.  No CVA tenderness  Musculoskeletal: No obvious deformities.   Neck supple, full range of motion, no obvious deformity.   No step-offs or deformities. Good range of motion all joints.  Neurologic: Moves all extremities.  Normal sensation.  No facial droop.  Normal speech.    Mental Status:  Alert and oriented x 3.  Appropriate, conversant.      Results and Medications    Procedures    Labs Reviewed - No data to display    Imaging Results    None             Medications   HYDROcodone-acetaminophen  mg per tablet 1 tablet (1 tablet Oral Given 6/6/24 0207)       MDM, Impression and Plan   Previous Records:   I decided to obtain old medical records which is listed here or in ED course:     Clinical Tests:   Lab Tests: Ordered and Reviewed  Radiological Study: Ordered and " Reviewed  Medical Tests: Ordered and Reviewed    Differential diagnosis:  -diverticular disease   -electrolyte abnormalities   -opioid withdrawal   -spinal fracture versus dislocation   -unlikely cauda equina    Initial Assessment & ED Management:    Urgent evaluation of 68 y.o. male with hypertension, diastolic heart failure diabetes, CAD, hyperlipidemia history of nephrolithiasis constipation who presents to the ED with abdominal pain beginning one month ago. Upon arrival to the ED, patient presents hemodynamically stable and afebrile with no respiratory distress.    He was slightly tachycardic his vitals spontaneously resolved.  He was given Norco for pain.  His physical exam generally unchanged.  His complaints seem to be improving since he was recently image and had a extensive workup in the ED on 06/02/2024.  He notes that his nausea has lessened and he was intermittently tolerated fluids.  He notes that his diarrhea as less than with Imodium and believes that this is due to opioid withdrawal.  He p.o. challenged successfully.  He was offered a full workup including COVID, Mag, phosphorus, CMP, CBC, lipase, urinalysis, digoxin level on repeat CT imaging and admission for intractable pain yet he declined.  He states that he will go home and try to find a pain management doctor that will prescribe opioids for him.  I instructed him if his pain is to worsen, he was unable to continue to tolerate fluids by mouth have decreased urine output or any worsening or new symptoms he should return to the ED for evaluation.  Care plan addressed with patient and all those present. All questions answered.  Strict return precautions discussed.  Patient was instructed on the correct follow-up time and route.  They voiced verbal understanding and agreement  with the plan and were deemed stable for discharge.  Ambulatory referral to pain Medicine placed   Medical Decision Making  Amount and/or Complexity of Data  Reviewed  External Data Reviewed: notes.     Details: On chart review patient was seen for similar symptoms on 06/02/2024.  He would his CT abdomen pelvis with IV contrast which showed no acute intra-abdominal abnormalities, colonic diverticula, mild acute versus subacute L1 vertebral body compression fracture and postsurgical changes otherwise no gross abnormalities.  He was seen by pain medicine on 06/04/2024, per their note they ended that he had a L1 compression fracture while recovering from the procedure however they considered showed an L1 kyphoplasty but his pain was improving  Labs: ordered.    Risk  Prescription drug management.           Please see ED course for discussion of workup and dispo if not listed above.          Final diagnoses:  [R11.2] Nausea and vomiting, unspecified vomiting type (Primary)  [M54.9] Back pain, unspecified back location, unspecified back pain laterality, unspecified chronicity        ED Disposition Condition    Discharge Stable          ED Prescriptions    None       Follow-up Information       Follow up With Specialties Details Why Contact Info    Tushar Drew MD Internal Medicine In 2 days  [address] [facility] [address]  [phone]      [facility] [place] [facility] Emergency Medicine  If symptoms worsen [address] [facility] [address]  [phone]            ED Course as of 06/06/24 0308   Thu Jun 06, 2024   0148 Echo on 4 03/2024 shows:  Left Ventricle: The left ventricle is normal in size. Increased wall thickness. There is concentric remodeling. There is normal systolic function with a visually estimated ejection fraction of 55 - 60%. Grade I diastolic dysfunction.  ·  Right Ventricle: Normal right ventricular cavity size. Wall thickness is normal. Right ventricle wall motion  is normal. Systolic function is normal.   [HM]      ED Course User Index  [HM] Tia Brandt MD     I, Celina Pichardo, scribed for, and in the presence of, Rikki  Tia TRIMBLE MD  . I performed the scribed service and the documentation accurately describes the services I performed. I attest to the accuracy of the note.        Physician Attestation for Scribe: I, Tia Brandt MD , reviewed documentation as scribed in my presence, which is both accurate and complete.        MD Rikki Garrett Heyward B, MD  06/06/24 030

## 2024-06-10 ENCOUNTER — PATIENT MESSAGE (OUTPATIENT)
Dept: NEUROSURGERY | Facility: CLINIC | Age: 69
End: 2024-06-10

## 2024-06-11 ENCOUNTER — PATIENT MESSAGE (OUTPATIENT)
Dept: CARDIOLOGY | Facility: CLINIC | Age: 69
End: 2024-06-11
Payer: MEDICARE

## 2024-06-11 ENCOUNTER — TELEPHONE (OUTPATIENT)
Dept: NEUROSURGERY | Facility: CLINIC | Age: 69
End: 2024-06-11
Payer: MEDICARE

## 2024-06-11 NOTE — TELEPHONE ENCOUNTER
----- Message from Edward Gupta sent at 6/10/2024  9:37 AM CDT -----  Name Of Caller: Nithin        Provider Name:Ana Nance PA-C        Does patient feel the need to be seen today? no        Relationship to the Pt?:  patient        Contact Preference?:  [phone]        What is the nature of the call?: Patient has an appointment scheduled for today with Ana Nance PA-C at 1:30pm, patient states that he needs to rescheduled this appointment.

## 2024-06-19 ENCOUNTER — TELEPHONE (OUTPATIENT)
Dept: ORTHOPEDICS | Facility: CLINIC | Age: 69
End: 2024-06-19
Payer: MEDICARE

## 2024-06-19 NOTE — TELEPHONE ENCOUNTER
I spoke to patient about rescheduling his appointment due to the Provider being out. He was okay and rescheduled to another day.

## 2024-06-20 ENCOUNTER — TELEPHONE (OUTPATIENT)
Dept: PAIN MEDICINE | Facility: CLINIC | Age: 69
End: 2024-06-20
Payer: MEDICARE

## 2024-06-20 NOTE — TELEPHONE ENCOUNTER
Staff tried contacting patient to get him scheduled from his referral.pt has a voicemail that has not been setup.

## 2024-06-24 ENCOUNTER — PATIENT MESSAGE (OUTPATIENT)
Dept: ENDOCRINOLOGY | Facility: CLINIC | Age: 69
End: 2024-06-24
Payer: MEDICARE

## 2024-06-24 DIAGNOSIS — E11.65 TYPE 2 DIABETES MELLITUS WITH HYPERGLYCEMIA, WITH LONG-TERM CURRENT USE OF INSULIN: Primary | ICD-10-CM

## 2024-06-24 DIAGNOSIS — Z79.52 LONG TERM (CURRENT) USE OF SYSTEMIC STEROIDS: ICD-10-CM

## 2024-06-24 DIAGNOSIS — Z79.4 TYPE 2 DIABETES MELLITUS WITH HYPERGLYCEMIA, WITH LONG-TERM CURRENT USE OF INSULIN: Primary | ICD-10-CM

## 2024-06-25 ENCOUNTER — PATIENT MESSAGE (OUTPATIENT)
Dept: ENDOCRINOLOGY | Facility: CLINIC | Age: 69
End: 2024-06-25
Payer: MEDICARE

## 2024-06-26 ENCOUNTER — HOSPITAL ENCOUNTER (EMERGENCY)
Facility: OTHER | Age: 69
Discharge: HOME | End: 2024-06-27
Attending: INTERNAL MEDICINE
Payer: MEDICARE

## 2024-06-26 DIAGNOSIS — R53.83 FATIGUE: ICD-10-CM

## 2024-06-26 DIAGNOSIS — R11.2 NAUSEA AND VOMITING, UNSPECIFIED VOMITING TYPE: ICD-10-CM

## 2024-06-26 DIAGNOSIS — E86.0 DEHYDRATION: Primary | ICD-10-CM

## 2024-06-26 DIAGNOSIS — R79.89 ELEVATED TROPONIN: ICD-10-CM

## 2024-06-26 LAB
ABO + RH BLD: NORMAL
ALBUMIN SERPL BCP-MCNC: 1.8 G/DL (ref 3.5–5.2)
ALP SERPL-CCNC: 473 U/L (ref 55–135)
ALT SERPL W/O P-5'-P-CCNC: 120 U/L (ref 10–44)
ANION GAP SERPL CALC-SCNC: 11 MMOL/L (ref 8–16)
AST SERPL-CCNC: 188 U/L (ref 10–40)
BACTERIA #/AREA URNS HPF: ABNORMAL /HPF
BASOPHILS # BLD AUTO: 0.05 K/UL (ref 0–0.2)
BASOPHILS NFR BLD: 0.8 % (ref 0–1.9)
BILIRUB SERPL-MCNC: 4.1 MG/DL (ref 0.1–1)
BILIRUB UR QL STRIP: ABNORMAL
BLD GP AB SCN CELLS X3 SERPL QL: NORMAL
BNP SERPL-MCNC: 192 PG/ML (ref 0–99)
BUN SERPL-MCNC: 9 MG/DL (ref 8–23)
CALCIUM SERPL-MCNC: 7.9 MG/DL (ref 8.7–10.5)
CAOX CRY URNS QL MICRO: ABNORMAL
CHLORIDE SERPL-SCNC: 101 MMOL/L (ref 95–110)
CK SERPL-CCNC: 37 U/L (ref 20–200)
CLARITY UR: ABNORMAL
CO2 SERPL-SCNC: 19 MMOL/L (ref 23–29)
COLOR UR: ABNORMAL
CREAT SERPL-MCNC: 0.8 MG/DL (ref 0.5–1.4)
CTP QC/QA: YES
CTP QC/QA: YES
DIFFERENTIAL METHOD BLD: ABNORMAL
EOSINOPHIL # BLD AUTO: 0.1 K/UL (ref 0–0.5)
EOSINOPHIL NFR BLD: 2.1 % (ref 0–8)
ERYTHROCYTE [DISTWIDTH] IN BLOOD BY AUTOMATED COUNT: 14.8 % (ref 11.5–14.5)
EST. GFR  (NO RACE VARIABLE): >60 ML/MIN/1.73 M^2
GLUCOSE SERPL-MCNC: 127 MG/DL (ref 70–110)
GLUCOSE UR QL STRIP: ABNORMAL
HCT VFR BLD AUTO: 42.2 % (ref 40–54)
HGB BLD-MCNC: 14.5 G/DL (ref 14–18)
HGB UR QL STRIP: NEGATIVE
HYALINE CASTS #/AREA URNS LPF: 0 /LPF
IMM GRANULOCYTES # BLD AUTO: 0.01 K/UL (ref 0–0.04)
IMM GRANULOCYTES NFR BLD AUTO: 0.2 % (ref 0–0.5)
KETONES UR QL STRIP: ABNORMAL
LACTATE SERPL-SCNC: 2.7 MMOL/L (ref 0.5–2.2)
LEUKOCYTE ESTERASE UR QL STRIP: NEGATIVE
LIPASE SERPL-CCNC: 3 U/L (ref 4–60)
LYMPHOCYTES # BLD AUTO: 2 K/UL (ref 1–4.8)
LYMPHOCYTES NFR BLD: 32.7 % (ref 18–48)
MAGNESIUM SERPL-MCNC: 1.7 MG/DL (ref 1.6–2.6)
MCH RBC QN AUTO: 30.3 PG (ref 27–31)
MCHC RBC AUTO-ENTMCNC: 34.4 G/DL (ref 32–36)
MCV RBC AUTO: 88 FL (ref 82–98)
MICROSCOPIC COMMENT: ABNORMAL
MONOCYTES # BLD AUTO: 0.4 K/UL (ref 0.3–1)
MONOCYTES NFR BLD: 6.9 % (ref 4–15)
NEUTROPHILS # BLD AUTO: 3.6 K/UL (ref 1.8–7.7)
NEUTROPHILS NFR BLD: 57.3 % (ref 38–73)
NITRITE UR QL STRIP: POSITIVE
NRBC BLD-RTO: 0 /100 WBC
PH UR STRIP: 7 [PH] (ref 5–8)
PHOSPHATE SERPL-MCNC: 2.8 MG/DL (ref 2.7–4.5)
PLATELET # BLD AUTO: 255 K/UL (ref 150–450)
PMV BLD AUTO: 12.1 FL (ref 9.2–12.9)
POC MOLECULAR INFLUENZA A AGN: NEGATIVE
POC MOLECULAR INFLUENZA B AGN: NEGATIVE
POCT GLUCOSE: 129 MG/DL (ref 70–110)
POTASSIUM SERPL-SCNC: 3.8 MMOL/L (ref 3.5–5.1)
PROT SERPL-MCNC: 5.8 G/DL (ref 6–8.4)
PROT UR QL STRIP: ABNORMAL
RBC # BLD AUTO: 4.79 M/UL (ref 4.6–6.2)
RBC #/AREA URNS HPF: 0 /HPF (ref 0–4)
SARS-COV-2 RDRP RESP QL NAA+PROBE: NEGATIVE
SODIUM SERPL-SCNC: 131 MMOL/L (ref 136–145)
SP GR UR STRIP: 1.02 (ref 1–1.03)
SPECIMEN OUTDATE: NORMAL
SQUAMOUS #/AREA URNS HPF: 2 /HPF
TROPONIN I SERPL DL<=0.01 NG/ML-MCNC: 0.03 NG/ML (ref 0–0.03)
URN SPEC COLLECT METH UR: ABNORMAL
UROBILINOGEN UR STRIP-ACNC: ABNORMAL EU/DL
WBC # BLD AUTO: 6.21 K/UL (ref 3.9–12.7)
WBC #/AREA URNS HPF: 6 /HPF (ref 0–5)

## 2024-06-26 PROCEDURE — 80053 COMPREHEN METABOLIC PANEL: CPT | Performed by: INTERNAL MEDICINE

## 2024-06-26 PROCEDURE — 87635 SARS-COV-2 COVID-19 AMP PRB: CPT | Performed by: INTERNAL MEDICINE

## 2024-06-26 PROCEDURE — 85025 COMPLETE CBC W/AUTO DIFF WBC: CPT | Performed by: INTERNAL MEDICINE

## 2024-06-26 PROCEDURE — 86901 BLOOD TYPING SEROLOGIC RH(D): CPT | Performed by: INTERNAL MEDICINE

## 2024-06-26 PROCEDURE — 83605 ASSAY OF LACTIC ACID: CPT | Performed by: INTERNAL MEDICINE

## 2024-06-26 PROCEDURE — 86900 BLOOD TYPING SEROLOGIC ABO: CPT | Performed by: INTERNAL MEDICINE

## 2024-06-26 PROCEDURE — 83735 ASSAY OF MAGNESIUM: CPT | Performed by: INTERNAL MEDICINE

## 2024-06-26 PROCEDURE — 83880 ASSAY OF NATRIURETIC PEPTIDE: CPT | Performed by: INTERNAL MEDICINE

## 2024-06-26 PROCEDURE — 99285 EMERGENCY DEPT VISIT HI MDM: CPT | Mod: 25

## 2024-06-26 PROCEDURE — 63600175 PHARM REV CODE 636 W HCPCS: Performed by: INTERNAL MEDICINE

## 2024-06-26 PROCEDURE — 25000003 PHARM REV CODE 250: Performed by: INTERNAL MEDICINE

## 2024-06-26 PROCEDURE — 84484 ASSAY OF TROPONIN QUANT: CPT | Performed by: INTERNAL MEDICINE

## 2024-06-26 PROCEDURE — 82962 GLUCOSE BLOOD TEST: CPT

## 2024-06-26 PROCEDURE — 83690 ASSAY OF LIPASE: CPT | Performed by: INTERNAL MEDICINE

## 2024-06-26 PROCEDURE — 82550 ASSAY OF CK (CPK): CPT | Performed by: INTERNAL MEDICINE

## 2024-06-26 PROCEDURE — 87502 INFLUENZA DNA AMP PROBE: CPT

## 2024-06-26 PROCEDURE — 81000 URINALYSIS NONAUTO W/SCOPE: CPT | Performed by: INTERNAL MEDICINE

## 2024-06-26 PROCEDURE — 87086 URINE CULTURE/COLONY COUNT: CPT | Performed by: INTERNAL MEDICINE

## 2024-06-26 PROCEDURE — 96374 THER/PROPH/DIAG INJ IV PUSH: CPT

## 2024-06-26 PROCEDURE — 84100 ASSAY OF PHOSPHORUS: CPT | Performed by: INTERNAL MEDICINE

## 2024-06-26 PROCEDURE — 96361 HYDRATE IV INFUSION ADD-ON: CPT

## 2024-06-26 PROCEDURE — 87088 URINE BACTERIA CULTURE: CPT | Performed by: INTERNAL MEDICINE

## 2024-06-26 RX ORDER — ONDANSETRON HYDROCHLORIDE 2 MG/ML
4 INJECTION, SOLUTION INTRAVENOUS
Status: COMPLETED | OUTPATIENT
Start: 2024-06-26 | End: 2024-06-26

## 2024-06-26 RX ADMIN — SODIUM CHLORIDE 500 ML: 9 INJECTION, SOLUTION INTRAVENOUS at 11:06

## 2024-06-26 RX ADMIN — ONDANSETRON 4 MG: 2 INJECTION INTRAMUSCULAR; INTRAVENOUS at 11:06

## 2024-06-27 VITALS
OXYGEN SATURATION: 100 % | BODY MASS INDEX: 30.8 KG/M2 | WEIGHT: 220 LBS | HEIGHT: 71 IN | TEMPERATURE: 98 F | SYSTOLIC BLOOD PRESSURE: 97 MMHG | RESPIRATION RATE: 20 BRPM | DIASTOLIC BLOOD PRESSURE: 67 MMHG | HEART RATE: 95 BPM

## 2024-06-27 LAB
LACTATE SERPL-SCNC: 1.8 MMOL/L (ref 0.5–2.2)
OHS QRS DURATION: 94 MS
OHS QTC CALCULATION: 454 MS
TROPONIN I SERPL DL<=0.01 NG/ML-MCNC: 0.02 NG/ML (ref 0–0.03)

## 2024-06-27 PROCEDURE — 25500020 PHARM REV CODE 255: Performed by: INTERNAL MEDICINE

## 2024-06-27 PROCEDURE — 83605 ASSAY OF LACTIC ACID: CPT | Performed by: INTERNAL MEDICINE

## 2024-06-27 PROCEDURE — 84484 ASSAY OF TROPONIN QUANT: CPT | Performed by: INTERNAL MEDICINE

## 2024-06-27 PROCEDURE — 87040 BLOOD CULTURE FOR BACTERIA: CPT | Performed by: INTERNAL MEDICINE

## 2024-06-27 RX ADMIN — IOHEXOL 100 ML: 350 INJECTION, SOLUTION INTRAVENOUS at 12:06

## 2024-06-27 NOTE — ED NOTES
"Attempt for IV access x 2 by BE Dowd and x 2 by this RN. Blood work obtained. Unable to obtain IV access. Charge nurse notified. Pt states he "needs a break" from being stuck again.   "

## 2024-06-27 NOTE — ED PROVIDER NOTES
"Encounter date: 10:20 PM 06/27/2024    Source of History   Patient/Family    Chief Complaint   Pt presents with:   Emesis (NV x 4 days. Feels "dehydrated and malnourished". Intermittent CP and back pain "x 1 year". Fatigued, "feels like he is going to faint". "If I didn't come in tonight, I would be dead tomorrow.")      History Of Present Illness   Nithin Wagner is a 68 y.o. male with History of hepatitis-B, hypertension, diastolic heart failure, diabetes, rheumatoid arthritis, hyponatremia, s/p s/p T11 and L3 kyphoplasty on 4/22/2024 who presents to the ED with lower abdominal pain recurrent onset 4 days ago. Patient reports he has experienced associated nausea, vomiting, and decreased appetite for the past 4 days. He notes that he has had 3-4 episodes of emesis since onset. He adds that he can only keep food down when he takes small bites. Patient states that he was told to come to the ED by home nurse due to concern for dehydration. Patient adds that recently his urine has been dark in color. He also notes that he has diarrhea, subjective fever, and a cough that has been going on for about 1 week. Patient also reports that he is having mid-sternal chest pain. Family at bedside notes that that  the patient has been slightly more confused than normal. Patient denies any hematemesis, blood in stool, hematuria, back pain, head trauma, and recent falls/injury.    This is the extent to the patients complaints today here in the emergency department.  Past History     Review of patient's allergies indicates:   Allergen Reactions    Enbrel [etanercept] Shortness Of Breath     CHF    Nsaids (non-steroidal anti-inflammatory drug) Other (See Comments)     hypertention  Other reaction(s): Other (See Comments)  hypertention  HTN  Patient states he tolerates anti inflammatory eye drops    Statins-hmg-coa reductase inhibitors Other (See Comments)     Heart arrythemias  Other reaction(s): Other (See Comments)  Heart " "arrythemias  Joint pain and cardiac arrythmias    Pcn [penicillins] Rash       No current facility-administered medications on file prior to encounter.     Current Outpatient Medications on File Prior to Encounter   Medication Sig Dispense Refill    aspirin (ECOTRIN) 81 MG EC tablet Take 1 tablet (81 mg total) by mouth once daily. 30 tablet 2    BD ULTRA-FINE RAHUL PEN NEEDLE 32 gauge x 5/32" Ndle To use 6 daily 400 each 3    diclofenac sodium (SOLARAZE) 3 % gel Apply to affected area three times a day. 100 g 5    digoxin (LANOXIN) 125 mcg tablet TAKE 1 TABLET BY MOUTH ONCE DAILY. 90 tablet 3    ergocalciferol (ERGOCALCIFEROL) 50,000 unit Cap Take 1 capsule (50,000 Units total) by mouth every 7 days. 12 capsule 3    EScitalopram oxalate (LEXAPRO) 20 MG tablet Take 20 mg by mouth every evening.      folic acid (FOLVITE) 1 MG tablet Take 1 tablet (1 mg total) by mouth once daily. 30 tablet 5    gabapentin (NEURONTIN) 400 MG capsule Take 1 capsule (400 mg total) by mouth 3 (three) times daily. 90 capsule 11    HYDROcodone-acetaminophen 7.5-300 mg Tab take 1 tablet by mouth every 6 hours as needed for pain 30 tablet 0    insulin pen,reusable,BT,aspart (INPEN, NOVOLOG OR FIASP, PINK) InPn To use with inpen cartridges 1 each 1    ketoconazole (NIZORAL) 2 % cream Apply to affected area DAILY 30 g 3    Lactobacillus rhamnosus GG (CULTURELLE) 10 billion cell capsule Take 1 capsule by mouth once daily. 30 capsule 2    lamiVUDine (EPIVIR) 150 MG Tab TAKE 1 TABLET BY MOUTH EVERY DAY 30 tablet 23    LIDOcaine (LIDODERM) 5 % Place 1 patch onto the skin once daily. Remove & Discard patch within 12 hours or as directed by MD 30 patch 2    multivitamin (THERAGRAN) per tablet Take 1 tablet by mouth once daily.      naloxone (NARCAN) 4 mg/actuation Spry USE AS DIRECTED INTRANASAL FOR SUSPECTED DRUG OVERDOSE 1 each 1    nitroGLYCERIN (NITROSTAT) 0.4 MG SL tablet PLACE 1 TABLET UNDER THE TONGUE EVERY 5 MINUTES AS NEEDED FOR CHEST PAIN. " 100 tablet 2    nystatin (MYCOSTATIN) powder Apply topically 4 (four) times daily. Apply to intertriginous areas as directed up to four times daily to reduce moisture. for 14 days 30 g 0    oxyCODONE (ROXICODONE) 10 mg Tab immediate release tablet Take 1 tablet by mouth every 8 hours as needed severe pain, medically necessary greater than 7 days; HOLD HYDROCODONE/ACETAMENOPHEN 30 tablet 0    oxyCODONE (ROXICODONE) 10 mg Tab immediate release tablet Take 1 tablet (10 mg total) by mouth every 6 (six) hours as needed severe pain, medically necessary greater than 7 days 30 tablet 0    oxyCODONE (ROXICODONE) 10 mg Tab immediate release tablet Take 1 tablet by mouth every 6 hours as needed for severe pain, medically necessary greater than 7 day supply 30 tablet 0    polyethylene glycol (GLYCOLAX) 17 gram/dose powder Use cap to measure 17 grams, mix in liquid and take by mouth once daily. 510 g 2    PROAIR HFA 90 mcg/actuation inhaler       tirzepatide 7.5 mg/0.5 mL PnIj Inject 7.5 mg into the skin every 7 days. 4 Pen 11    walker Misc 1 Units by Misc.(Non-Drug; Combo Route) route daily as needed (Rollator Walker). 1 each 0       As per HPI and below:  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    forehead    Stroke     TIA     Past Surgical History:   Procedure Laterality Date    CATARACT EXTRACTION W/  INTRAOCULAR LENS IMPLANT Right 3/28/2024    Procedure: EXTRACTION, CATARACT, WITH IOL INSERTION;  Surgeon: Hans Woodward MD;  Location: [facility];  Service: Ophthalmology;  Laterality: Right;    CORONARY ANGIOGRAPHY N/A 3/10/2021     Procedure: ANGIOGRAM, CORONARY ARTERY - right radial;  Surgeon: Shemar Dempsey MD;  Location: [facility] CATH LAB;  Service: Cardiology;  Laterality: N/A;    CORONARY STENT PLACEMENT  03/10/2021    prox-mid RCA [place] 4.5 x 26 mm, 4.5 x 12 mm    FIXATION KYPHOPLASTY N/A 4/22/2024    Procedure: KYPHOPLASTY;  Surgeon: Stefan Stern MD;  Location: [facility] OR;  Service: Pain Management;  Laterality: N/A;  NO PROPOFOL    INCISION AND DRAINAGE OF SCROTUM N/A 11/15/2022    Procedure: INCISION AND DRAINAGE, SCROTUM;  Surgeon: William Hatch MD;  Location: [facility] OR;  Service: Urology;  Laterality: N/A;    INCISION AND DRAINAGE OF SCROTUM N/A 11/17/2022    Procedure: INCISION AND DRAINAGE, SCROTUM;  Surgeon: William Hatch MD;  Location: [facility] OR;  Service: Urology;  Laterality: N/A;    LUNG LOBECTOMY Right 2008    RUL lobectomy after removal of atrial myxoma    MAGNETIC RESONANCE IMAGING N/A 4/30/2024    Procedure: MRI (Magnetic Resonance Imagine);  Surgeon: Peace Watts;  Location: [facility];  Service: Anesthesiology;  Laterality: N/A;    PLEURA BIOPSY      RESECTION OF ATRIAL MYXOMA  2007       Social History     Socioeconomic History    Marital status: Single   Occupational History    Occupation: , respiratory therapist, founded [employer]     Comment: Retired   Tobacco Use    Smoking status: Every Day     Current packs/day: 1.00     Average packs/day: 1 pack/day for 35.0 years (35.0 ttl pk-yrs)     Types: Cigarettes    Smokeless tobacco: Never   Substance and Sexual Activity    Alcohol use: Not Currently    Drug use: No    Sexual activity: Never     Social Determinants of Health     Financial Resource Strain: Low Risk  (4/30/2024)    Overall Financial Resource Strain (CARDIA)     Difficulty of Paying Living Expenses: Not very hard   Food Insecurity: No Food Insecurity (4/30/2024)    Hunger Vital Sign     Worried About Running Out of Food in the Last Year: Never true     Ran Out of Food in the Last Year:  Never true   Transportation Needs: No Transportation Needs (4/30/2024)    PRAPARE - Transportation     Lack of Transportation (Medical): No     Lack of Transportation (Non-Medical): No   Physical Activity: Inactive (4/27/2024)    Exercise Vital Sign     Days of Exercise per Week: 0 days     Minutes of Exercise per Session: 0 min   Stress: Stress Concern Present (4/30/2024)    [employer] [place] [employer] - Occupational Stress Questionnaire     Feeling of Stress : To some extent   Housing Stability: Low Risk  (4/30/2024)    Housing Stability Vital Sign     Unable to Pay for Housing in the Last Year: No     Homeless in the Last Year: No       Family History   Problem Relation Name Age of Onset    Hodgkin's lymphoma Mother      Diabetes type II Mother      Kidney failure Father      Diabetes type I Father      Cancer Sister         Physical Exam     Vitals:    06/27/24 0041 06/27/24 0116 06/27/24 0205 06/27/24 0209   BP: 138/73   97/67   Pulse: 89 89 95    Resp:  17  20   Temp:    98.4 °F (36.9 °C)   TempSrc:    Oral   SpO2: 95% 95% 100%    Weight:       Height:         Physical Exam:   Nursing note and vitals reviewed.  Appearance:  Chronically ill, nontoxic male in no acute respiratory distress.  Making purposeful movements.  Speaking in full sentences.  Skin: No obvious rashes seen.  Good turgor.  No abrasions.  No ecchymoses.  Eyes: No conjunctival injection. EOMI, PERRL.  Head: NC/AT  Chest: CTAB. No increased work of breathing, bilateral chest rise.  Cardiovascular: Regular rate and rhythm.  Normal equal bilateral radial pulses.  Abdomen: Soft.  Not distended.  Nontender.  No guarding.  No rebound. No Masses.  No tenderness in right upper quadrant.  No organomegaly  Musculoskeletal: No obvious deformities.   Neck supple, full range of motion, no obvious deformity.   No tenderness to palpation of cervical through lumbar spine.  No step-offs or deformities. Good range of motion all joints.  Neurologic:  Moves all extremities.  Normal sensation.  No facial droop.  Normal speech.    Mental Status:  Alert and oriented x 3.  Appropriate, conversant.      Results and Medications    Procedures    Labs Reviewed   CBC W/ AUTO DIFFERENTIAL - Abnormal; Notable for the following components:       Result Value    RDW 14.8 (*)     All other components within normal limits   COMPREHENSIVE METABOLIC PANEL - Abnormal; Notable for the following components:    Sodium 131 (*)     CO2 19 (*)     Glucose 127 (*)     Calcium 7.9 (*)     Total Protein 5.8 (*)     Albumin 1.8 (*)     Total Bilirubin 4.1 (*)     Alkaline Phosphatase 473 (*)      (*)      (*)     All other components within normal limits   URINALYSIS, REFLEX TO URINE CULTURE - Abnormal; Notable for the following components:    Color, UA Orange (*)     Appearance, UA Hazy (*)     Protein, UA 1+ (*)     Glucose, UA Trace (*)     Ketones, UA 1+ (*)     Bilirubin (UA) 3+ (*)     Nitrite, UA Positive (*)     Urobilinogen, UA 4.0-6.0 (*)     All other components within normal limits    Narrative:     Specimen Source->Urine   LACTIC ACID, PLASMA - Abnormal; Notable for the following components:    Lactate (Lactic Acid) 2.7 (*)     All other components within normal limits   TROPONIN I - Abnormal; Notable for the following components:    Troponin I 0.027 (*)     All other components within normal limits   LIPASE - Abnormal; Notable for the following components:    Lipase 3 (*)     All other components within normal limits   B-TYPE NATRIURETIC PEPTIDE - Abnormal; Notable for the following components:     (*)     All other components within normal limits   URINALYSIS MICROSCOPIC - Abnormal; Notable for the following components:    WBC, UA 6 (*)     All other components within normal limits    Narrative:     Specimen Source->Urine   POCT GLUCOSE - Abnormal; Notable for the following components:    POCT Glucose 129 (*)     All other components within normal limits    CULTURE, URINE   CULTURE, URINE   CULTURE, BLOOD   CULTURE, BLOOD   MAGNESIUM   PHOSPHORUS   CK   CK   TROPONIN I   LACTIC ACID, PLASMA   SARS-COV-2 RDRP GENE   POCT INFLUENZA A/B MOLECULAR   TYPE & SCREEN   POCT GLUCOSE MONITORING CONTINUOUS       Imaging Results              X-Ray Chest AP Portable (Final result)  Result time 06/27/24 00:45:30      Final result by Latanya Liz MD (06/27/24 00:45:30)                   Impression:      Stable chronic right-sided lung changes with otherwise no superimposed acute cardiopulmonary process identified.      Electronically signed by: Latanya Liz MD  Date:    06/27/2024  Time:    00:45               Narrative:    EXAMINATION:  XR CHEST AP PORTABLE    CLINICAL HISTORY:  Other fatigue    TECHNIQUE:  Single frontal view of the chest was performed.    COMPARISON:  CT abdomen pelvis from the same date.  Previous chest radiograph and CT chest from 04/28/2024.    FINDINGS:  Cardiac silhouette is stable in size.  Lungs are symmetrically expanded.  Chronic changes and scarring are seen in the right mid and lower lung zones similar to previous radiograph and CT.  No evidence of new focal consolidative process, pneumothorax, or large pleural effusion.  There is similar blunting of the right costophrenic angle with no significant pleural effusion seen on concurrent abdominal CT.  No acute osseous abnormality identified.                                        CT Abdomen Pelvis With IV Contrast NO Oral Contrast (Final result)  Result time 06/27/24 01:26:05      Final result by Steven Castañeda MD (06/27/24 01:26:05)                   Impression:      ABDOMEN CT and PELVIS CT:    Diffuse colitis, of numerous possible etiologies.  Correlate clinically.    Gastroduodenitis, likely with some proximal jejunal enteritis.    Small quantity of intraperitoneal free fluid.  No pneumoperitoneum.    Bilateral nonobstructing intrarenal calculi.    Profound diffuse hepatic steatosis.   Correlate clinically.  Consider outpatient referral to a hepatology/gastroenterology.    Bibasilar pulmonary opacities similar to 06/02/2024, possibly representing subsegmental atelectasis and scarring.  Infectious, inflammatory, or neoplastic process is not excluded.  Correlate clinically, and with follow-up outpatient chest CT within 3-6 months.    Other observations as detailed in the body of the report.    This report was flagged in Epic as abnormal.      Electronically signed by: Steven Castañeda  Date:    06/27/2024  Time:    01:26               Narrative:    EXAMINATION:  CT ABDOMEN PELVIS WITH IV CONTRAST    CLINICAL HISTORY:  Abdominal pain, acute, nonlocalized;    TECHNIQUE:  Low dose axial images, sagittal and coronal reformations were obtained from the lung bases to the pubic symphysis following the IV administration of 100 mL of Omnipaque 350.    COMPARISON:  CT abdomen pelvis with IV contrast 06/02/2024.    CT images from the PET-CT scan of 12/03/2008    FINDINGS:  ABDOMEN CT and PELVIS CT:    Artifacts related to motion and/or beam hardening degrade numerous images.    LOWER THORAX: There remains some hazy, bandlike, planar opacities in both lung bases that could represent a combination of scarring and subsegmental atelectasis.  Infectious, inflammatory, or neoplastic process is not excluded.    The cardiopericardial silhouette demonstrates partially visualized coronary artery calcifications PA the lower mediastinum and distal thoracic esophagus some straight no acute CT abnormalities.    LIVER: Profound diffuse hepatic parenchymal hypoattenuation, most compatible with significant diffuse hepatic steatosis no discrete hepatic masses are demonstrated.  Liver remains mildly enlarged, with right hepatic lobe craniocaudal dimension of approximately 19 cm.  Prominent caudate lobe.    GALLBLADDER: Again appears distended, but without CT evidence of wall thickening or pericholecystic inflammatory  changes.    BILE DUCTS: No abnormal dilatation.  No radiopaque choledocholithiasis.    PANCREAS: Diffusely atrophic.  No acute CT abnormality.  No discrete mass apparent by CT.    SPLEEN: Normal size.  Homogeneous enhancement.    ADRENAL GLANDS: Nodular fullness of the adrenal gland apices bilaterally, similar to CT images extending least as far back as 12/03/2008.    KIDNEYS: Prompt and symmetric cortical nephrograms bilaterally.  Nonobstructing bilateral renal calculi.  No hydroureteronephrosis.  No radiopaque calculi in the ureters or urinary bladder; urethra not fully visualized.    Right renal hilar arterial calcification.    No discrete renal soft tissue masses.    STOMACH: Very poorly distended.  Mucosal hyperenhancement suspicious for gastritis.    DUODENUM: Mucosal hyperenhancement suspicious for gastritis.  Trace periduodenal fluid/stranding.    SMALL BOWEL: Mucosal hyperenhancement in the proximal jejunum.  No evidence of high-grade small bowel obstruction.    LARGE BOWEL: Extensive mucosal thickening, involving almost the entirety of the colon, suspicious for colitis.  Occasional diverticula, no CT evidence of acute diverticulitis.  No CT evidence of high-grade colonic obstruction.    APPENDIX: Normal.    PERITONEAL CAVITY: No pneumoperitoneum.  Small quantity of water-attenuation intraperitoneal free fluid in the abdomen and pelvis.    VASCULATURE: Scattered aortoiliac and branch vessel atherosclerotic changes.  No alicia aneurysmal enlargement of the abdominal aorta or iliac arteries.  No retroperitoneal hematoma or periaortic stranding.    Normal course and caliber of the IVC.  Visualized portions of the portal venous system and hepatic veins appear grossly patent.    LYMPH NODES: By size criteria, no abdominopelvic lymphadenopathy is demonstrated.    URINARY BLADDER: Poorly distended.  No acute CT abnormality apparent.    REPRODUCTIVE TRACT: Normal-sized prostate gland and seminal vesicles.    BODY  WALL: Visualized portion demonstrates no acute CT abnormality.    MUSCULOSKELETAL: Scattered degenerative/arthritic changes.  T11 and L3 vertebroplasty changes.  Pre-existing burst fracture deformity of the L1 vertebral body, similar to 06/02/2024.  Symphysis pubis, caudal portions of the osseous pelvis, and trochanteric portions of the proximal femurs, are not fully visualized.  Degenerative changes in the SI joints and hips.     IMAGES: No additional contributory findings.                                          Medications   sodium chloride 0.9% bolus 500 mL 500 mL (0 mLs Intravenous Stopped 6/27/24 0004)   ondansetron injection 4 mg (4 mg Intravenous Given 6/26/24 2334)   iohexoL (OMNIPAQUE 350) injection 100 mL (100 mLs Intravenous Given 6/27/24 0023)       MDM, Impression and Plan   Previous Records:   I decided to obtain old medical records which is listed here or in ED course:     Independently Interpreted Test(s):   EKG:  I independently reviewed and interpreted the EKG and my findings are as follows:   Detailed here or in ED course.   IMAGING:  I have ordered and independently interpreted X-rays and my findings are as follow:  Detailed here or in ED course. CXR shows no pneumothorax, pneumonia or pleural effusions.      Clinical Tests:   Lab Tests: Ordered and Reviewed  Radiological Study: Ordered and Reviewed  Medical Tests: Ordered and Reviewed    Differential diagnosis:  -Acute gastroenteritis   -GERD  -Gallbladder pathology  -PNA  -Bowel obstruction  -Bowel Perforation  -Appendicitis  -DKA  -ACS   -electrolyte abnormalities   -UTI   -      Initial Assessment & ED Management:    Urgent evaluation of 68 y.o. male with history as above who presents to the ED with lower abdominal pain recurrent onset 4 days ago and associated with nausea, vomiting, and diarrhea.     On arrival to the ED he is noted to be afebrile with a soft blood pressure tachycardic with low oxygen saturations.  His oxygen  saturation spontaneously improved and noted improvement in his blood pressure and tachycardia prior to initiation of interventions.  He was given a 500 cc bolus due to dehydration and Zofran with subsequent reduction in his nausea.  He was offered pain medications but states he has not actively having pain.  COVID and flu negative.  Lactic acid within normal limits EKG without signs of ischemia.  He states that he has not had chest pain in the last 24 hours.  His initial troponin was mildly elevated but spontaneously improved.  Urinalysis with no leukocyte esterase positive for nitrites and ketones.  CMP with a low CO2 and mildly elevated liver enzymes but his creatinine is near baseline.  A CPK was added on and not consistent with rhabdomyolysis.  His CT abdomen and pelvis showed diffuse colitis versus gastroduodenitis.  Blood cultures were drawn.  I do believe that his hypotension is due to dehydration.  No current indications for antibiotics as he was not having dysuria.  He has a pending urine culture.  He was offered admission yet states he would like to go home and follow up with his cardiologist.  He is p.o. challenging successfully.  He states that he will return if anything is to acutely worsened.  After shared decision-making I will not forcefully makes this patient signed out against medical advice.  I do feel comfortable that he will return if things worsen.  Care plan addressed with patient and all those present. All questions answered.  Strict return precautions discussed.  Patient was instructed on the correct follow-up time and route.  They voiced verbal understanding and agreement  with the plan and were deemed stable for discharge.   Medical Decision Making  Amount and/or Complexity of Data Reviewed  Labs: ordered. Decision-making details documented in ED Course.  Radiology: ordered.    Risk  Prescription drug management.             Please see ED course for discussion of workup and dispo if not  listed above.          Final diagnoses:  [R53.83] Fatigue  [E86.0] Dehydration (Primary)  [R79.89] Elevated troponin  [R11.2] Nausea and vomiting, unspecified vomiting type        ED Disposition Condition    Discharge Stable          ED Prescriptions    None       Follow-up Information       Follow up With Specialties Details Why Contact Info    Tushar Drew MD Internal Medicine In 2 days  [address] [facility] [address]  [phone]      Shemar Dempsey MD Cardiovascular Disease, Cardiology, Interventional Cardiology Schedule an appointment as soon as possible for a visit in 2 days  [address]  [address]  [facility] [address]  [phone]              ED Course as of 06/27/24 0324 Wed Jun 26, 2024 2225 Patient recently hospitalized and discharged on 05/08/2024 after being admitted for worsening back pain and diarrhea.  Was discharged to skilled nursing facility on 05/08/2024.  Was seen in the ED on 06/02/2022 for diarrhea and his CT showed    Impression:     1. No acute intra-abdominal abnormalities identified.  2. Scattered colonic diverticula with no evidence of acute diverticulitis.  3. Suspected hepatic steatosis.  4. Mild acute versus subacute L1 vertebral body compression fracture which is new compared to recent CT from 04/25/2024.  5. Postsurgical changes and additional findings as detailed above.   [HM]   2225 Echo on 04/03/2022 for shows  ·  Left Ventricle: The left ventricle is normal in size. Increased wall thickness. There is concentric remodeling. There is normal systolic function with a visually estimated ejection fraction of 55 - 60%. Grade I diastolic dysfunction.  ·  Right Ventricle: Normal right ventricular cavity size. Wall thickness is normal. Right ventricle wall motion  is normal. Systolic function is normal.  [HM]   2233 EKG shows sinus tachycardia with ventricular rate of 106 beats per minute.  Low voltage.  No acute signs of ischemia or infarction. [HM]   2320  Hemoglobin: 14.5 [HM]   2320 WBC: 6.21 []   2358 NITRITE UA(!): Positive []   2358 WBC, UA(!): 6 [HM]   Thu Jun 27, 2024   0157 TSH within normal limits 2 months ago []      ED Course User Index  [] Tia Brandt MD I, Stephanie Colin, scribed for, and in the presence of, Tia Brandt MD. I performed the scribed service and the documentation accurately describes the services I performed. I attest to the accuracy of the note.     Physician Attestation for Scribe: I, Tia Brandt MD, reviewed documentation as scribed in my presence, which is both accurate and complete.    MD Rikki Garrett Heyward B, MD  06/27/24 0320

## 2024-06-27 NOTE — DISCHARGE INSTRUCTIONS
Diagnosis:   1. Dehydration    2. Fatigue    3. Elevated troponin        Tests you had showed:   Labs Reviewed   CBC W/ AUTO DIFFERENTIAL - Abnormal; Notable for the following components:       Result Value    RDW 14.8 (*)     All other components within normal limits   COMPREHENSIVE METABOLIC PANEL - Abnormal; Notable for the following components:    Sodium 131 (*)     CO2 19 (*)     Glucose 127 (*)     Calcium 7.9 (*)     Total Protein 5.8 (*)     Albumin 1.8 (*)     Total Bilirubin 4.1 (*)     Alkaline Phosphatase 473 (*)      (*)      (*)     All other components within normal limits   URINALYSIS, REFLEX TO URINE CULTURE - Abnormal; Notable for the following components:    Color, UA Orange (*)     Appearance, UA Hazy (*)     Protein, UA 1+ (*)     Glucose, UA Trace (*)     Ketones, UA 1+ (*)     Bilirubin (UA) 3+ (*)     Nitrite, UA Positive (*)     Urobilinogen, UA 4.0-6.0 (*)     All other components within normal limits    Narrative:     Specimen Source->Urine   LACTIC ACID, PLASMA - Abnormal; Notable for the following components:    Lactate (Lactic Acid) 2.7 (*)     All other components within normal limits   TROPONIN I - Abnormal; Notable for the following components:    Troponin I 0.027 (*)     All other components within normal limits   LIPASE - Abnormal; Notable for the following components:    Lipase 3 (*)     All other components within normal limits   B-TYPE NATRIURETIC PEPTIDE - Abnormal; Notable for the following components:     (*)     All other components within normal limits   URINALYSIS MICROSCOPIC - Abnormal; Notable for the following components:    WBC, UA 6 (*)     All other components within normal limits    Narrative:     Specimen Source->Urine   POCT GLUCOSE - Abnormal; Notable for the following components:    POCT Glucose 129 (*)     All other components within normal limits   CULTURE, URINE   CULTURE, URINE   CULTURE, BLOOD   CULTURE, BLOOD   MAGNESIUM   PHOSPHORUS    CK   CK   TROPONIN I   LACTIC ACID, PLASMA   SARS-COV-2 RDRP GENE   POCT INFLUENZA A/B MOLECULAR   TYPE & SCREEN   POCT GLUCOSE MONITORING CONTINUOUS      X-Ray Chest AP Portable   Final Result      Stable chronic right-sided lung changes with otherwise no superimposed acute cardiopulmonary process identified.         Electronically signed by: Latanya Liz MD   Date:    06/27/2024   Time:    00:45      CT Abdomen Pelvis With IV Contrast NO Oral Contrast   Final Result   Abnormal      ABDOMEN CT and PELVIS CT:      Diffuse colitis, of numerous possible etiologies.  Correlate clinically.      Gastroduodenitis, likely with some proximal jejunal enteritis.      Small quantity of intraperitoneal free fluid.  No pneumoperitoneum.      Bilateral nonobstructing intrarenal calculi.      Profound diffuse hepatic steatosis.  Correlate clinically.  Consider outpatient referral to a hepatology/gastroenterology.      Bibasilar pulmonary opacities similar to 06/02/2024, possibly representing subsegmental atelectasis and scarring.  Infectious, inflammatory, or neoplastic process is not excluded.  Correlate clinically, and with follow-up outpatient chest CT within 3-6 months.      Other observations as detailed in the body of the report.      This report was flagged in Epic as abnormal.         Electronically signed by: Steven Castañeda   Date:    06/27/2024   Time:    01:26          Treatments you received were:   Medications   sodium chloride 0.9% bolus 500 mL 500 mL (0 mLs Intravenous Stopped 6/27/24 0004)   ondansetron injection 4 mg (4 mg Intravenous Given 6/26/24 2334)   iohexoL (OMNIPAQUE 350) injection 100 mL (100 mLs Intravenous Given 6/27/24 0023)       Home Care Instructions:  - Medications: Continue taking your home medications as prescribed    Follow-Up Plan:  - Follow-up with: Primary care doctor within 1-2  days  - Additional testing and/or evaluation will be directed by your primary doctor    Return to the  Emergency Department for symptoms including but not limited to: worsening symptoms, severe back pain, shortness of breath or chest pain, vomiting with inability to hold down fluids, blood in vomit or poop, fevers greater than 100.4°F, passing out/fainting/unconsciousness, or other concerning symptoms.     Future Appointments   Date Time Provider Department Center   7/8/2024  3:30 PM Nithin Garza MD [facility] HEPAT [facility]   7/9/2024  2:30 PM Ivone Uriarte PA-C [facility] SPINE [facility]   7/24/2024  2:00 PM Lalitha Stroud RN, CDE [facility]   9/30/2024  3:00 PM Izabel Marc NP [facility] ENDODIA [facility]

## 2024-06-27 NOTE — ED TRIAGE NOTES
"Nithin Wagner, a 68 y.o. male presents to the ED via car with great grandson with complaints of chest pain and sob intermittently but not at the present time. Chest pain intermittent times one year. Patient complained of feeling fatigued for 4 days with increasing weakness, nausea and vomiting. Patient says that he feels dehydrated and malnourished. Pt resting comfortably. Connected to cardiac monitor, BP cuff, and pulse oximeter. ED workup in progress. Call light within reach. Safety measures in place. Denies further needs. Plan of care ongoing.      Chief Complaint   Patient presents with    Emesis     NV x 4 days. Feels "dehydrated and malnourished". Intermittent CP and back pain "x 1 year". Fatigued, "feels like he is going to faint". "If I didn't come in tonight, I would be dead tomorrow."     Review of patient's allergies indicates:   Allergen Reactions    Enbrel [etanercept] Shortness Of Breath     CHF    Nsaids (non-steroidal anti-inflammatory drug) Other (See Comments)     hypertention  Other reaction(s): Other (See Comments)  hypertention  HTN  Patient states he tolerates anti inflammatory eye drops    Statins-hmg-coa reductase inhibitors Other (See Comments)     Heart arrythemias  Other reaction(s): Other (See Comments)  Heart arrythemias  Joint pain and cardiac arrythmias    Pcn [penicillins] Rash     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    forehead    Stroke     TIA     Past Surgical History:   Procedure Laterality Date    CATARACT EXTRACTION W/  INTRAOCULAR LENS IMPLANT Right 3/28/2024    Procedure: " EXTRACTION, CATARACT, WITH IOL INSERTION;  Surgeon: Hans Woodward MD;  Location: [facility] OR;  Service: Ophthalmology;  Laterality: Right;    CORONARY ANGIOGRAPHY N/A 3/10/2021    Procedure: ANGIOGRAM, CORONARY ARTERY - right radial;  Surgeon: Shemar Dempsey MD;  Location: [facility] CATH LAB;  Service: Cardiology;  Laterality: N/A;    CORONARY STENT PLACEMENT  03/10/2021    prox-mid RCA [place] 4.5 x 26 mm, 4.5 x 12 mm    FIXATION KYPHOPLASTY N/A 4/22/2024    Procedure: KYPHOPLASTY;  Surgeon: Stefan Stern MD;  Location: [facility] OR;  Service: Pain Management;  Laterality: N/A;  NO PROPOFOL    INCISION AND DRAINAGE OF SCROTUM N/A 11/15/2022    Procedure: INCISION AND DRAINAGE, SCROTUM;  Surgeon: William Hatch MD;  Location: [facility] OR;  Service: Urology;  Laterality: N/A;    INCISION AND DRAINAGE OF SCROTUM N/A 11/17/2022    Procedure: INCISION AND DRAINAGE, SCROTUM;  Surgeon: William Hatch MD;  Location: [facility] OR;  Service: Urology;  Laterality: N/A;    LUNG LOBECTOMY Right 2008    RUL lobectomy after removal of atrial myxoma    MAGNETIC RESONANCE IMAGING N/A 4/30/2024    Procedure: MRI (Magnetic Resonance Imagine);  Surgeon: Pecae Watts;  Location: [facility];  Service: Anesthesiology;  Laterality: N/A;    PLEURA BIOPSY      RESECTION OF ATRIAL MYXOMA  2007

## 2024-06-28 ENCOUNTER — NURSE TRIAGE (OUTPATIENT)
Dept: ADMINISTRATIVE | Facility: CLINIC | Age: 69
End: 2024-06-28
Payer: MEDICARE

## 2024-06-28 ENCOUNTER — PATIENT OUTREACH (OUTPATIENT)
Dept: EMERGENCY MEDICINE | Facility: OTHER | Age: 69
End: 2024-06-28
Payer: MEDICARE

## 2024-06-28 ENCOUNTER — HOSPITAL ENCOUNTER (INPATIENT)
Facility: OTHER | Age: 69
LOS: 15 days | Discharge: HOME HEALTH | DRG: 391 | End: 2024-07-14
Attending: EMERGENCY MEDICINE | Admitting: HOSPITALIST
Payer: MEDICARE

## 2024-06-28 DIAGNOSIS — Z79.4 TYPE 2 DIABETES MELLITUS WITH OTHER CIRCULATORY COMPLICATION, WITH LONG-TERM CURRENT USE OF INSULIN: ICD-10-CM

## 2024-06-28 DIAGNOSIS — R79.89 ABNORMAL LFTS (LIVER FUNCTION TESTS): ICD-10-CM

## 2024-06-28 DIAGNOSIS — R53.1 WEAKNESS: ICD-10-CM

## 2024-06-28 DIAGNOSIS — R07.9 CHEST PAIN: ICD-10-CM

## 2024-06-28 DIAGNOSIS — R18.8 OTHER ASCITES: ICD-10-CM

## 2024-06-28 DIAGNOSIS — E43 SEVERE PROTEIN-CALORIE MALNUTRITION: ICD-10-CM

## 2024-06-28 DIAGNOSIS — Z91.89 AT RISK FOR PROLONGED QT INTERVAL SYNDROME: ICD-10-CM

## 2024-06-28 DIAGNOSIS — E11.59 TYPE 2 DIABETES MELLITUS WITH OTHER CIRCULATORY COMPLICATION, WITH LONG-TERM CURRENT USE OF INSULIN: ICD-10-CM

## 2024-06-28 DIAGNOSIS — K52.9 COLITIS: ICD-10-CM

## 2024-06-28 DIAGNOSIS — Z79.899 ON PRE-EXPOSURE PROPHYLAXIS FOR HIV: Primary | ICD-10-CM

## 2024-06-28 DIAGNOSIS — R11.10 VOMITING, UNSPECIFIED VOMITING TYPE, UNSPECIFIED WHETHER NAUSEA PRESENT: ICD-10-CM

## 2024-06-28 DIAGNOSIS — K52.9 ENTERITIS: ICD-10-CM

## 2024-06-28 DIAGNOSIS — R10.9 ABDOMINAL PAIN: ICD-10-CM

## 2024-06-28 DIAGNOSIS — K74.60 HEPATIC CIRRHOSIS, UNSPECIFIED HEPATIC CIRRHOSIS TYPE, UNSPECIFIED WHETHER ASCITES PRESENT: ICD-10-CM

## 2024-06-28 PROBLEM — I47.10 SVT (SUPRAVENTRICULAR TACHYCARDIA): Status: ACTIVE | Noted: 2024-06-28

## 2024-06-28 PROBLEM — K76.0 FATTY (CHANGE OF) LIVER, NOT ELSEWHERE CLASSIFIED: Status: ACTIVE | Noted: 2024-05-01

## 2024-06-28 LAB
ALBUMIN SERPL BCP-MCNC: 1.6 G/DL (ref 3.5–5.2)
ALP SERPL-CCNC: 476 U/L (ref 55–135)
ALT SERPL W/O P-5'-P-CCNC: 112 U/L (ref 10–44)
ANION GAP SERPL CALC-SCNC: 10 MMOL/L (ref 8–16)
APTT PPP: 28.8 SEC (ref 21–32)
AST SERPL-CCNC: 165 U/L (ref 10–40)
BACTERIA UR CULT: ABNORMAL
BASOPHILS # BLD AUTO: 0.05 K/UL (ref 0–0.2)
BASOPHILS NFR BLD: 1 % (ref 0–1.9)
BILIRUB SERPL-MCNC: 4.1 MG/DL (ref 0.1–1)
BUN SERPL-MCNC: 9 MG/DL (ref 8–23)
CALCIUM SERPL-MCNC: 7.6 MG/DL (ref 8.7–10.5)
CHLORIDE SERPL-SCNC: 101 MMOL/L (ref 95–110)
CO2 SERPL-SCNC: 21 MMOL/L (ref 23–29)
CREAT SERPL-MCNC: 0.7 MG/DL (ref 0.5–1.4)
DIFFERENTIAL METHOD BLD: ABNORMAL
EOSINOPHIL # BLD AUTO: 0.1 K/UL (ref 0–0.5)
EOSINOPHIL NFR BLD: 2.1 % (ref 0–8)
ERYTHROCYTE [DISTWIDTH] IN BLOOD BY AUTOMATED COUNT: 15.2 % (ref 11.5–14.5)
EST. GFR  (NO RACE VARIABLE): >60 ML/MIN/1.73 M^2
GLUCOSE SERPL-MCNC: 124 MG/DL (ref 70–110)
HCT VFR BLD AUTO: 40.4 % (ref 40–54)
HGB BLD-MCNC: 13.8 G/DL (ref 14–18)
IMM GRANULOCYTES # BLD AUTO: 0.02 K/UL (ref 0–0.04)
IMM GRANULOCYTES NFR BLD AUTO: 0.4 % (ref 0–0.5)
INR PPP: 1.4 (ref 0.8–1.2)
LACTATE SERPL-SCNC: 1.2 MMOL/L (ref 0.5–2.2)
LDH SERPL L TO P-CCNC: 2.63 MMOL/L (ref 0.5–2.2)
LIPASE SERPL-CCNC: <3 U/L (ref 4–60)
LYMPHOCYTES # BLD AUTO: 1.2 K/UL (ref 1–4.8)
LYMPHOCYTES NFR BLD: 23.4 % (ref 18–48)
MCH RBC QN AUTO: 30.3 PG (ref 27–31)
MCHC RBC AUTO-ENTMCNC: 34.2 G/DL (ref 32–36)
MCV RBC AUTO: 89 FL (ref 82–98)
MONOCYTES # BLD AUTO: 0.4 K/UL (ref 0.3–1)
MONOCYTES NFR BLD: 7.3 % (ref 4–15)
NEUTROPHILS # BLD AUTO: 3.4 K/UL (ref 1.8–7.7)
NEUTROPHILS NFR BLD: 65.8 % (ref 38–73)
NRBC BLD-RTO: 0 /100 WBC
PLATELET # BLD AUTO: 230 K/UL (ref 150–450)
PMV BLD AUTO: 11.7 FL (ref 9.2–12.9)
POCT GLUCOSE: 116 MG/DL (ref 70–110)
POTASSIUM SERPL-SCNC: 3.5 MMOL/L (ref 3.5–5.1)
PROT SERPL-MCNC: 5.2 G/DL (ref 6–8.4)
PROTHROMBIN TIME: 16 SEC (ref 9–12.5)
RBC # BLD AUTO: 4.56 M/UL (ref 4.6–6.2)
SAMPLE: ABNORMAL
SODIUM SERPL-SCNC: 132 MMOL/L (ref 136–145)
WBC # BLD AUTO: 5.21 K/UL (ref 3.9–12.7)

## 2024-06-28 PROCEDURE — 96367 TX/PROPH/DG ADDL SEQ IV INF: CPT

## 2024-06-28 PROCEDURE — 87040 BLOOD CULTURE FOR BACTERIA: CPT | Performed by: NURSE PRACTITIONER

## 2024-06-28 PROCEDURE — 85610 PROTHROMBIN TIME: CPT | Performed by: NURSE PRACTITIONER

## 2024-06-28 PROCEDURE — 85025 COMPLETE CBC W/AUTO DIFF WBC: CPT | Performed by: NURSE PRACTITIONER

## 2024-06-28 PROCEDURE — 80074 ACUTE HEPATITIS PANEL: CPT | Performed by: NURSE PRACTITIONER

## 2024-06-28 PROCEDURE — 93010 ELECTROCARDIOGRAM REPORT: CPT | Mod: ,,, | Performed by: INTERNAL MEDICINE

## 2024-06-28 PROCEDURE — 93005 ELECTROCARDIOGRAM TRACING: CPT

## 2024-06-28 PROCEDURE — 96372 THER/PROPH/DIAG INJ SC/IM: CPT | Performed by: NURSE PRACTITIONER

## 2024-06-28 PROCEDURE — 63600175 PHARM REV CODE 636 W HCPCS: Performed by: NURSE PRACTITIONER

## 2024-06-28 PROCEDURE — 63600175 PHARM REV CODE 636 W HCPCS: Mod: JZ,JG | Performed by: EMERGENCY MEDICINE

## 2024-06-28 PROCEDURE — 83605 ASSAY OF LACTIC ACID: CPT | Performed by: NURSE PRACTITIONER

## 2024-06-28 PROCEDURE — G0378 HOSPITAL OBSERVATION PER HR: HCPCS

## 2024-06-28 PROCEDURE — 25000003 PHARM REV CODE 250: Performed by: NURSE PRACTITIONER

## 2024-06-28 PROCEDURE — 85730 THROMBOPLASTIN TIME PARTIAL: CPT | Performed by: NURSE PRACTITIONER

## 2024-06-28 PROCEDURE — 96365 THER/PROPH/DIAG IV INF INIT: CPT

## 2024-06-28 PROCEDURE — 25000003 PHARM REV CODE 250: Performed by: EMERGENCY MEDICINE

## 2024-06-28 PROCEDURE — 99285 EMERGENCY DEPT VISIT HI MDM: CPT | Mod: 25

## 2024-06-28 PROCEDURE — 80053 COMPREHEN METABOLIC PANEL: CPT | Performed by: NURSE PRACTITIONER

## 2024-06-28 PROCEDURE — 36415 COLL VENOUS BLD VENIPUNCTURE: CPT | Performed by: NURSE PRACTITIONER

## 2024-06-28 PROCEDURE — 87150 DNA/RNA AMPLIFIED PROBE: CPT | Performed by: NURSE PRACTITIONER

## 2024-06-28 PROCEDURE — 83690 ASSAY OF LIPASE: CPT | Performed by: NURSE PRACTITIONER

## 2024-06-28 RX ORDER — LAMIVUDINE 150 MG/1
150 TABLET, FILM COATED ORAL DAILY
Status: DISCONTINUED | OUTPATIENT
Start: 2024-06-29 | End: 2024-06-28

## 2024-06-28 RX ORDER — IBUPROFEN 200 MG
24 TABLET ORAL
Status: DISCONTINUED | OUTPATIENT
Start: 2024-06-28 | End: 2024-07-14 | Stop reason: HOSPADM

## 2024-06-28 RX ORDER — HYDROCODONE BITARTRATE AND ACETAMINOPHEN 5; 325 MG/1; MG/1
1 TABLET ORAL
Status: COMPLETED | OUTPATIENT
Start: 2024-06-28 | End: 2024-06-28

## 2024-06-28 RX ORDER — ESCITALOPRAM OXALATE 10 MG/1
20 TABLET ORAL NIGHTLY
Status: DISCONTINUED | OUTPATIENT
Start: 2024-06-28 | End: 2024-07-14 | Stop reason: HOSPADM

## 2024-06-28 RX ORDER — TALC
6 POWDER (GRAM) TOPICAL NIGHTLY PRN
Status: DISCONTINUED | OUTPATIENT
Start: 2024-06-28 | End: 2024-07-14 | Stop reason: HOSPADM

## 2024-06-28 RX ORDER — SODIUM CHLORIDE 9 MG/ML
1000 INJECTION, SOLUTION INTRAVENOUS
Status: COMPLETED | OUTPATIENT
Start: 2024-06-28 | End: 2024-06-28

## 2024-06-28 RX ORDER — OXYCODONE HYDROCHLORIDE 10 MG/1
10 TABLET ORAL EVERY 6 HOURS PRN
Status: DISCONTINUED | OUTPATIENT
Start: 2024-06-28 | End: 2024-07-01

## 2024-06-28 RX ORDER — OXYCODONE HYDROCHLORIDE 5 MG/1
5 TABLET ORAL EVERY 6 HOURS PRN
Status: DISCONTINUED | OUTPATIENT
Start: 2024-06-28 | End: 2024-06-28

## 2024-06-28 RX ORDER — INSULIN ASPART 100 [IU]/ML
0-5 INJECTION, SOLUTION INTRAVENOUS; SUBCUTANEOUS
Status: DISCONTINUED | OUTPATIENT
Start: 2024-06-28 | End: 2024-07-14 | Stop reason: HOSPADM

## 2024-06-28 RX ORDER — METRONIDAZOLE 500 MG/100ML
500 INJECTION, SOLUTION INTRAVENOUS
Status: COMPLETED | OUTPATIENT
Start: 2024-06-28 | End: 2024-06-28

## 2024-06-28 RX ORDER — CIPROFLOXACIN 2 MG/ML
400 INJECTION, SOLUTION INTRAVENOUS
Status: DISCONTINUED | OUTPATIENT
Start: 2024-06-29 | End: 2024-07-05

## 2024-06-28 RX ORDER — OXYCODONE HYDROCHLORIDE 5 MG/1
5 TABLET ORAL EVERY 6 HOURS PRN
Status: DISCONTINUED | OUTPATIENT
Start: 2024-06-28 | End: 2024-07-01

## 2024-06-28 RX ORDER — DIGOXIN 125 MCG
0.12 TABLET ORAL DAILY
Status: DISCONTINUED | OUTPATIENT
Start: 2024-06-29 | End: 2024-07-14 | Stop reason: HOSPADM

## 2024-06-28 RX ORDER — ENOXAPARIN SODIUM 100 MG/ML
40 INJECTION SUBCUTANEOUS EVERY 24 HOURS
Status: DISCONTINUED | OUTPATIENT
Start: 2024-06-28 | End: 2024-06-28

## 2024-06-28 RX ORDER — ACETAMINOPHEN 325 MG/1
650 TABLET ORAL EVERY 8 HOURS PRN
Status: DISCONTINUED | OUTPATIENT
Start: 2024-06-28 | End: 2024-07-14 | Stop reason: HOSPADM

## 2024-06-28 RX ORDER — CIPROFLOXACIN 2 MG/ML
400 INJECTION, SOLUTION INTRAVENOUS
Status: COMPLETED | OUTPATIENT
Start: 2024-06-28 | End: 2024-06-28

## 2024-06-28 RX ORDER — PROCHLORPERAZINE EDISYLATE 5 MG/ML
5 INJECTION INTRAMUSCULAR; INTRAVENOUS EVERY 6 HOURS PRN
Status: DISCONTINUED | OUTPATIENT
Start: 2024-06-28 | End: 2024-07-04

## 2024-06-28 RX ORDER — IBUPROFEN 200 MG
16 TABLET ORAL
Status: DISCONTINUED | OUTPATIENT
Start: 2024-06-28 | End: 2024-07-14 | Stop reason: HOSPADM

## 2024-06-28 RX ORDER — ONDANSETRON HYDROCHLORIDE 2 MG/ML
4 INJECTION, SOLUTION INTRAVENOUS EVERY 6 HOURS PRN
Status: DISCONTINUED | OUTPATIENT
Start: 2024-06-28 | End: 2024-07-04

## 2024-06-28 RX ORDER — METRONIDAZOLE 500 MG/100ML
500 INJECTION, SOLUTION INTRAVENOUS
Status: DISCONTINUED | OUTPATIENT
Start: 2024-06-29 | End: 2024-07-05

## 2024-06-28 RX ORDER — LIDOCAINE 50 MG/G
1 PATCH TOPICAL
Status: DISCONTINUED | OUTPATIENT
Start: 2024-06-28 | End: 2024-07-14 | Stop reason: HOSPADM

## 2024-06-28 RX ORDER — GLUCAGON 1 MG
1 KIT INJECTION
Status: DISCONTINUED | OUTPATIENT
Start: 2024-06-28 | End: 2024-07-14 | Stop reason: HOSPADM

## 2024-06-28 RX ORDER — NALOXONE HCL 0.4 MG/ML
0.02 VIAL (ML) INJECTION
Status: DISCONTINUED | OUTPATIENT
Start: 2024-06-28 | End: 2024-07-14 | Stop reason: HOSPADM

## 2024-06-28 RX ORDER — SODIUM CHLORIDE 0.9 % (FLUSH) 0.9 %
10 SYRINGE (ML) INJECTION EVERY 8 HOURS PRN
Status: DISCONTINUED | OUTPATIENT
Start: 2024-06-28 | End: 2024-07-02

## 2024-06-28 RX ORDER — LAMIVUDINE 150 MG/1
150 TABLET, FILM COATED ORAL NIGHTLY
Status: DISCONTINUED | OUTPATIENT
Start: 2024-06-28 | End: 2024-07-14 | Stop reason: HOSPADM

## 2024-06-28 RX ADMIN — CIPROFLOXACIN 400 MG: 2 INJECTION, SOLUTION INTRAVENOUS at 04:06

## 2024-06-28 RX ADMIN — LIDOCAINE 1 PATCH: 50 PATCH CUTANEOUS at 10:06

## 2024-06-28 RX ADMIN — ESCITALOPRAM OXALATE 20 MG: 10 TABLET ORAL at 10:06

## 2024-06-28 RX ADMIN — ENOXAPARIN SODIUM 40 MG: 40 INJECTION SUBCUTANEOUS at 10:06

## 2024-06-28 RX ADMIN — SODIUM CHLORIDE 1000 ML: 9 INJECTION, SOLUTION INTRAVENOUS at 04:06

## 2024-06-28 RX ADMIN — HYDROCODONE BITARTRATE AND ACETAMINOPHEN 1 TABLET: 5; 325 TABLET ORAL at 04:06

## 2024-06-28 RX ADMIN — OXYCODONE HYDROCHLORIDE 5 MG: 5 TABLET ORAL at 10:06

## 2024-06-28 RX ADMIN — METRONIDAZOLE 500 MG: 500 INJECTION, SOLUTION INTRAVENOUS at 05:06

## 2024-06-28 NOTE — TELEPHONE ENCOUNTER
"Seen in ER yesterday for dehydration & generalized weakness. Says not any better. Getting weaker. Thinks he needs home IV fluids. Urine looks like "coca cola". Unable to stand without help, feels like he would fall or pass out now if he tried to stand.     Dispo-hang up & call 911 now. Pt vu.     Reason for Disposition   Shock suspected (e.g., cold/pale/clammy skin, too weak to stand, low BP, rapid pulse)    Additional Information   Negative: SEVERE difficulty breathing (e.g., struggling for each breath, speaks in single words)    Protocols used: Weakness (Generalized) and Fatigue-A-OH    "

## 2024-06-28 NOTE — FIRST PROVIDER EVALUATION
" Emergency Department TeleTriage Encounter Note      CHIEF COMPLAINT    Chief Complaint   Patient presents with    Weakness     Pt generalized weakness and dehydration. Pt was seen in ED a few days ago but states he has not gotten any better and "was told to come back"       VITAL SIGNS   Initial Vitals [06/28/24 1512]   BP Pulse Resp Temp SpO2   (!) 98/53 105 18 98.2 °F (36.8 °C) 98 %      MAP       --            ALLERGIES    Review of patient's allergies indicates:   Allergen Reactions    Enbrel [etanercept] Shortness Of Breath     CHF    Nsaids (non-steroidal anti-inflammatory drug) Other (See Comments)     hypertention  Other reaction(s): Other (See Comments)  hypertention  HTN  Patient states he tolerates anti inflammatory eye drops    Statins-hmg-coa reductase inhibitors Other (See Comments)     Heart arrythemias  Other reaction(s): Other (See Comments)  Heart arrythemias  Joint pain and cardiac arrythmias    Pcn [penicillins] Rash       PROVIDER TRIAGE NOTE  This is a teletriage evaluation of a 68 y.o. male presenting to the ED with c/o vomiting and diarrhea. Seen in ED on 6/26 for N/V. Sx not improving. ?colitis on imaging. BC with NGTD. Urine cx: with coag neg stpah 10-49k Limited physical exam via telehealth: The patient is awake, alert, answering questions appropriately and is not in respiratory distress. Initial orders will be placed and care will be transferred to an alternate provider when patient is roomed for a full evaluation. Any additional orders and the final disposition will be determined by that provider.         ORDERS  Labs Reviewed   CULTURE, BLOOD   CULTURE, BLOOD   CBC W/ AUTO DIFFERENTIAL   COMPREHENSIVE METABOLIC PANEL   URINALYSIS, REFLEX TO URINE CULTURE   LIPASE       ED Orders (720h ago, onward)      Start Ordered     Status Ordering Provider    06/28/24 1918 06/28/24 1518  Lactic acid, plasma #2  In 4 hours         Ordered LEONOR SEWELL    06/28/24 1518 06/28/24 1518  ED " Preference List Used to Initiate Sepsis Orders  Until discontinued         Ordered LEONOR SEWELL    06/28/24 1518 06/28/24 1518  Blood culture x two cultures. Draw prior to antibiotics.  Every 15 min      Comments: Aerobic and anaerobic     Order ID Start Status Ordering Provider   [id number] 06/28/24 1518 Ordered LEONOR SEWELL   [id number] 06/28/24 1533 Ordered LEONOR SEWELL       Ordered LEONOR SEWELL    06/28/24 1518 06/28/24 1518  CBC auto differential  STAT         Ordered LEONOR SEWELL P.    06/28/24 1518 06/28/24 1518  Comprehensive metabolic panel  STAT         Ordered LEONOR SEWELL    06/28/24 1518 06/28/24 1518  POCT Venous Blood Gas - Lactate #1  Once        Comments: This test should be used for VBGs.  If using this order for other tests (K, creatinine, HCT, PT/INR, lactate etc)  ONLY do so in the case of an emergency or rapid response.Notify Physician if: see parameters below.      Ordered LEONOR SEWELL    06/28/24 1518 06/28/24 1518  Vital signs  Every 15 min        Comments: Every 15 minutes until SBP greater than 90 or MAP greater than 65, then every 30 minutes times one hour, then every one hour.    Ordered LEONOR SEWELL    06/28/24 1518 06/28/24 1518  Bed rest  Until discontinued         Ordered LEONOR SEWELL P.    06/28/24 1518 06/28/24 1518  Cardiac Monitoring - Adult  Continuous        Comments: Notify Physician If:    Ordered LEONOR SEWELL    06/28/24 1518 06/28/24 1518  Pulse Oximetry Continuous  Continuous         Ordered LEONOR SEWELL P.    06/28/24 1518 06/28/24 1518  Strict intake and output  Until discontinued         Ordered LEONOR SEWELL P.    06/28/24 1518 06/28/24 1518  Urinalysis, Reflex to Urine Culture Urine, Clean Catch  STAT         Ordered LEONOR SEWELL    06/28/24 1518 06/28/24 1518  Saline lock IV  Once         Ordered LEONOR SEWELL PCarolyn    06/28/24 1518 06/28/24 1518  EKG 12-lead  Once         Ordered LEONOR SEWELL.    06/28/24 1518 06/28/24 1518   X-Ray Chest AP Portable  1 time imaging         Ordered LEONOR SEWELL    06/28/24 1518 06/28/24 1518  Lipase  STAT         Ordered LEONOR SEWELL              Virtual Visit Note: The provider triage portion of this emergency department evaluation and documentation was performed via [employer], a HIPAA-compliant telemedicine application, in concert with a tele-presenter in the room. A face to face patient evaluation with one of my colleagues will occur once the patient is placed in an emergency department room.      DISCLAIMER: This note was prepared with [employer] voice recognition transcription software. Garbled syntax, mangled pronouns, and other bizarre constructions may be attributed to that software system.

## 2024-06-28 NOTE — ED PROVIDER NOTES
"     Source of History:  The patient    Chief complaint:  Weakness (Pt generalized weakness and dehydration. Pt was seen in ED a few days ago but states he has not gotten any better and "was told to come back")      HPI:  Nithin Wagner is a 68 y.o. male  history of diastolic heart failure and diabetes hypertension presents with significant weakness.  Was seen here yesterday in the emergency department or emesis and weakness.  Workup showed findings suggestive of colitis and dehydration.  He was offered admission at that time but he stated he would prefer to go home and try.  Stated he went home and was unable to hold down significant fluids and came back.  Denies any fevers.  Had a normal bowel movement this morning.  Denies any abdominal pain.    This is the extent to the patients complaints today here in the emergency department.    ROS:   See HPI.    Review of patient's allergies indicates:   Allergen Reactions    Enbrel [etanercept] Shortness Of Breath     CHF    Nsaids (non-steroidal anti-inflammatory drug) Other (See Comments)     hypertention  Other reaction(s): Other (See Comments)  hypertention  HTN  Patient states he tolerates anti inflammatory eye drops    Statins-hmg-coa reductase inhibitors Other (See Comments)     Heart arrythemias  Other reaction(s): Other (See Comments)  Heart arrythemias  Joint pain and cardiac arrythmias    Pcn [penicillins] Rash       PMH:  As per HPI and below:  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    forehead    Stroke     TIA     Past Surgical History:   Procedure Laterality Date    " CATARACT EXTRACTION W/  INTRAOCULAR LENS IMPLANT Right 3/28/2024    Procedure: EXTRACTION, CATARACT, WITH IOL INSERTION;  Surgeon: Hans Woodward MD;  Location: [facility] OR;  Service: Ophthalmology;  Laterality: Right;    CORONARY ANGIOGRAPHY N/A 3/10/2021    Procedure: ANGIOGRAM, CORONARY ARTERY - right radial;  Surgeon: Shemar Dempsey MD;  Location: [facility] CATH LAB;  Service: Cardiology;  Laterality: N/A;    CORONARY STENT PLACEMENT  03/10/2021    prox-mid RCA [place] 4.5 x 26 mm, 4.5 x 12 mm    FIXATION KYPHOPLASTY N/A 4/22/2024    Procedure: KYPHOPLASTY;  Surgeon: Stefan Stern MD;  Location: [facility] OR;  Service: Pain Management;  Laterality: N/A;  NO PROPOFOL    INCISION AND DRAINAGE OF SCROTUM N/A 11/15/2022    Procedure: INCISION AND DRAINAGE, SCROTUM;  Surgeon: William Hatch MD;  Location: [facility] OR;  Service: Urology;  Laterality: N/A;    INCISION AND DRAINAGE OF SCROTUM N/A 11/17/2022    Procedure: INCISION AND DRAINAGE, SCROTUM;  Surgeon: William Hatch MD;  Location: [facility] OR;  Service: Urology;  Laterality: N/A;    LUNG LOBECTOMY Right 2008    RUL lobectomy after removal of atrial myxoma    MAGNETIC RESONANCE IMAGING N/A 4/30/2024    Procedure: MRI (Magnetic Resonance Imagine);  Surgeon: Peace Watts;  Location: [facility];  Service: Anesthesiology;  Laterality: N/A;    PLEURA BIOPSY      RESECTION OF ATRIAL MYXOMA  2007       Social History     Tobacco Use    Smoking status: Every Day     Current packs/day: 1.00     Average packs/day: 1 pack/day for 35.0 years (35.0 ttl pk-yrs)     Types: Cigarettes    Smokeless tobacco: Never   Substance Use Topics    Alcohol use: Not Currently    Drug use: No       Physical Exam:    /68   Pulse 85   Temp 98.2 °F (36.8 °C) (Oral)   Resp 18   Ht 6' (1.829 m)   Wt 97.5 kg (215 lb)   SpO2 95%   BMI 29.16 kg/m²   Nursing note and vital signs reviewed.  Constitutional:  Chronically ill-appearing Nontoxic   ENT:  tacky mucous membranes  Cardiovascular: Regular  rate and rhythm.  No murmurs. No gallops. No rubs  Respiratory: Clear to auscultation bilaterally.  Good air movement.  No wheezes.  No rhonchi. No rales. No accessory muscle use..  Abdomen: Soft.  Not distended.  Nontender.  No guarding.  No rebound. Non-peritoneal.  Extremities:  No pitting edema.  No obvious deformities.  Neuro: alert. At baseline.  No focal neurological deficits.    Summary of Previous Medical Records:  Patient is seen on 06/26/2024.  Workup including blood work and lactic acid.  He was offered hospital admission as the findings were suspicious of dehydration secondary to colitis.  Patient did not want to stay in the hospital and was discharge with close return precautions.      Differential Dx/ MDM/ Workup:  Colitis, enteritis, dehydration, metabolic derangement, sepsis.  Will get labs start antibiotics as well as give IV fluids.  Had a CT scan within the last 48 hours do not feel repeat gated at this time.      ED Course as of 06/28/24 1851   Fri Jun 28, 2024   1541 X-Ray Chest AP Portable  Chest x-ray independently interpreted by myself shows normal cardiac silhouette, blunted right diaphragm consistent with a, no focal consolidation, no pneumothorax or bony abnormalities.  Overall impression right pleural effusion.  Compared to a chest x-ray yesterday on 06/27/2024 those increased blunting of the right diaphragm. [SM]   1554 EKG 12-lead  EKG independently interpreted by myself shows normal sinus rhythm at a rate of 91, normal intervals, narrow QRS, no acute ST T wave abnormalities.  Low voltage EKG.  Compared to an EKG on June 26, 2024 shows no change. [SM]   1645 WBC: 5.21 [SM]   1645 Hemoglobin(!): 13.8 [SM]   1645 Platelet Count: 230 [SM]   1648 POC Lactate(!): 2.63 [SM]   1743 Sodium(!): 132 [SM]   1744 BILIRUBIN TOTAL(!): 4.1 [SM]   1744 ALP(!): 476 [SM]   1744 AST(!): 165 [SM]   1744 ALT(!): 112 [SM]   1744 Sodium 132 which reviewing previous results is baseline.  Patient's liver  enzymes are elevated which are similar to labs done yesterday but elevated compared to 3 weeks ago.  Kidney function remains normally.  Normal anion gap. []   1843 Will discuss with Hospital Medicine for hospitalization and further management. []   1844 BP: 113/68 [SM]   1844 Pulse: 85 [SM]   1844 Blood pressure and pulse improved with IV fluids.  Cap refill less than 2 seconds.   []   1851 Spoke with  and will admit to Dr Obrien. []      ED Course User Index  [] Rakesh Soares,                   Attending Attestation:         Attending Critical Care:   Critical Care Times:   ==============================================================  Total Critical Care Time - exclusive of procedural time: 50 minutes.  ==============================================================        Diagnostic Impression:    1. Weakness    2. Abdominal pain         ED Disposition Condition    Observation                   Rakesh Soares DO  06/28/24 1851

## 2024-06-29 LAB
ALBUMIN SERPL BCP-MCNC: 1.3 G/DL (ref 3.5–5.2)
ALP SERPL-CCNC: 416 U/L (ref 55–135)
ALT SERPL W/O P-5'-P-CCNC: 92 U/L (ref 10–44)
ANION GAP SERPL CALC-SCNC: 5 MMOL/L (ref 8–16)
AST SERPL-CCNC: 140 U/L (ref 10–40)
BACTERIA #/AREA URNS HPF: NORMAL /HPF
BASOPHILS # BLD AUTO: 0.05 K/UL (ref 0–0.2)
BASOPHILS NFR BLD: 1 % (ref 0–1.9)
BILIRUB SERPL-MCNC: 3.3 MG/DL (ref 0.1–1)
BILIRUB UR QL STRIP: ABNORMAL
BILIRUB UR QL STRIP: ABNORMAL
BNP SERPL-MCNC: 241 PG/ML (ref 0–99)
BUN SERPL-MCNC: 8 MG/DL (ref 8–23)
CALCIUM SERPL-MCNC: 7.1 MG/DL (ref 8.7–10.5)
CHLORIDE SERPL-SCNC: 104 MMOL/L (ref 95–110)
CLARITY UR: CLEAR
CLARITY UR: CLEAR
CO2 SERPL-SCNC: 22 MMOL/L (ref 23–29)
COLOR UR: YELLOW
COLOR UR: YELLOW
CREAT SERPL-MCNC: 0.6 MG/DL (ref 0.5–1.4)
DIFFERENTIAL METHOD BLD: ABNORMAL
EOSINOPHIL # BLD AUTO: 0.1 K/UL (ref 0–0.5)
EOSINOPHIL NFR BLD: 2.8 % (ref 0–8)
ERYTHROCYTE [DISTWIDTH] IN BLOOD BY AUTOMATED COUNT: 15.1 % (ref 11.5–14.5)
EST. GFR  (NO RACE VARIABLE): >60 ML/MIN/1.73 M^2
GLUCOSE SERPL-MCNC: 93 MG/DL (ref 70–110)
GLUCOSE UR QL STRIP: NEGATIVE
GLUCOSE UR QL STRIP: NEGATIVE
HAV IGM SERPL QL IA: NORMAL
HBV CORE IGM SERPL QL IA: NORMAL
HBV SURFACE AG SERPL QL IA: NORMAL
HCT VFR BLD AUTO: 34.8 % (ref 40–54)
HCV AB SERPL QL IA: NORMAL
HGB BLD-MCNC: 12.1 G/DL (ref 14–18)
HGB UR QL STRIP: NEGATIVE
HGB UR QL STRIP: NEGATIVE
HYALINE CASTS #/AREA URNS LPF: 0 /LPF
IMM GRANULOCYTES # BLD AUTO: 0.03 K/UL (ref 0–0.04)
IMM GRANULOCYTES NFR BLD AUTO: 0.6 % (ref 0–0.5)
KETONES UR QL STRIP: ABNORMAL
KETONES UR QL STRIP: ABNORMAL
LEUKOCYTE ESTERASE UR QL STRIP: NEGATIVE
LEUKOCYTE ESTERASE UR QL STRIP: NEGATIVE
LYMPHOCYTES # BLD AUTO: 1.5 K/UL (ref 1–4.8)
LYMPHOCYTES NFR BLD: 29.2 % (ref 18–48)
MAGNESIUM SERPL-MCNC: 1.6 MG/DL (ref 1.6–2.6)
MCH RBC QN AUTO: 30.6 PG (ref 27–31)
MCHC RBC AUTO-ENTMCNC: 34.8 G/DL (ref 32–36)
MCV RBC AUTO: 88 FL (ref 82–98)
MICROSCOPIC COMMENT: NORMAL
MONOCYTES # BLD AUTO: 0.4 K/UL (ref 0.3–1)
MONOCYTES NFR BLD: 8.5 % (ref 4–15)
MRSA ID BY PCR: NEGATIVE
NEUTROPHILS # BLD AUTO: 2.9 K/UL (ref 1.8–7.7)
NEUTROPHILS NFR BLD: 57.9 % (ref 38–73)
NITRITE UR QL STRIP: NEGATIVE
NITRITE UR QL STRIP: NEGATIVE
NRBC BLD-RTO: 0 /100 WBC
PH UR STRIP: 6 [PH] (ref 5–8)
PH UR STRIP: 6 [PH] (ref 5–8)
PLATELET # BLD AUTO: 196 K/UL (ref 150–450)
PMV BLD AUTO: 11.9 FL (ref 9.2–12.9)
POCT GLUCOSE: 85 MG/DL (ref 70–110)
POCT GLUCOSE: 89 MG/DL (ref 70–110)
POCT GLUCOSE: 90 MG/DL (ref 70–110)
POCT GLUCOSE: 93 MG/DL (ref 70–110)
POTASSIUM SERPL-SCNC: 3.3 MMOL/L (ref 3.5–5.1)
PROT SERPL-MCNC: 4.4 G/DL (ref 6–8.4)
PROT UR QL STRIP: ABNORMAL
PROT UR QL STRIP: ABNORMAL
RBC # BLD AUTO: 3.96 M/UL (ref 4.6–6.2)
RBC #/AREA URNS HPF: 1 /HPF (ref 0–4)
SODIUM SERPL-SCNC: 131 MMOL/L (ref 136–145)
SP GR UR STRIP: 1.03 (ref 1–1.03)
SP GR UR STRIP: 1.03 (ref 1–1.03)
SQUAMOUS #/AREA URNS HPF: 0 /HPF
STAPH AUREUS ID BY PCR: NEGATIVE
UNIDENT CRYS URNS QL MICRO: NORMAL
URN SPEC COLLECT METH UR: ABNORMAL
URN SPEC COLLECT METH UR: ABNORMAL
UROBILINOGEN UR STRIP-ACNC: ABNORMAL EU/DL
UROBILINOGEN UR STRIP-ACNC: ABNORMAL EU/DL
WBC # BLD AUTO: 4.96 K/UL (ref 3.9–12.7)
WBC #/AREA URNS HPF: 4 /HPF (ref 0–5)

## 2024-06-29 PROCEDURE — 83880 ASSAY OF NATRIURETIC PEPTIDE: CPT | Performed by: NURSE PRACTITIONER

## 2024-06-29 PROCEDURE — 94761 N-INVAS EAR/PLS OXIMETRY MLT: CPT

## 2024-06-29 PROCEDURE — 80053 COMPREHEN METABOLIC PANEL: CPT | Performed by: NURSE PRACTITIONER

## 2024-06-29 PROCEDURE — 36410 VNPNXR 3YR/> PHY/QHP DX/THER: CPT

## 2024-06-29 PROCEDURE — C1751 CATH, INF, PER/CENT/MIDLINE: HCPCS

## 2024-06-29 PROCEDURE — 21400001 HC TELEMETRY ROOM

## 2024-06-29 PROCEDURE — A4216 STERILE WATER/SALINE, 10 ML: HCPCS | Performed by: HOSPITALIST

## 2024-06-29 PROCEDURE — 63600175 PHARM REV CODE 636 W HCPCS: Performed by: NURSE PRACTITIONER

## 2024-06-29 PROCEDURE — 81000 URINALYSIS NONAUTO W/SCOPE: CPT | Performed by: NURSE PRACTITIONER

## 2024-06-29 PROCEDURE — 51798 US URINE CAPACITY MEASURE: CPT

## 2024-06-29 PROCEDURE — 36415 COLL VENOUS BLD VENIPUNCTURE: CPT | Performed by: NURSE PRACTITIONER

## 2024-06-29 PROCEDURE — 63600175 PHARM REV CODE 636 W HCPCS: Performed by: HOSPITALIST

## 2024-06-29 PROCEDURE — 25000003 PHARM REV CODE 250: Performed by: HOSPITALIST

## 2024-06-29 PROCEDURE — 25000003 PHARM REV CODE 250: Performed by: NURSE PRACTITIONER

## 2024-06-29 PROCEDURE — 83735 ASSAY OF MAGNESIUM: CPT | Performed by: NURSE PRACTITIONER

## 2024-06-29 PROCEDURE — 85025 COMPLETE CBC W/AUTO DIFF WBC: CPT | Performed by: NURSE PRACTITIONER

## 2024-06-29 RX ORDER — MAGNESIUM SULFATE HEPTAHYDRATE 40 MG/ML
2 INJECTION, SOLUTION INTRAVENOUS ONCE
Status: COMPLETED | OUTPATIENT
Start: 2024-06-29 | End: 2024-06-29

## 2024-06-29 RX ORDER — SODIUM CHLORIDE, SODIUM LACTATE, POTASSIUM CHLORIDE, CALCIUM CHLORIDE 600; 310; 30; 20 MG/100ML; MG/100ML; MG/100ML; MG/100ML
INJECTION, SOLUTION INTRAVENOUS CONTINUOUS
Status: DISCONTINUED | OUTPATIENT
Start: 2024-06-29 | End: 2024-07-03

## 2024-06-29 RX ORDER — SODIUM CHLORIDE 0.9 % (FLUSH) 0.9 %
10 SYRINGE (ML) INJECTION EVERY 6 HOURS
Status: DISCONTINUED | OUTPATIENT
Start: 2024-06-29 | End: 2024-07-14 | Stop reason: HOSPADM

## 2024-06-29 RX ORDER — SODIUM CHLORIDE 0.9 % (FLUSH) 0.9 %
10 SYRINGE (ML) INJECTION
Status: DISCONTINUED | OUTPATIENT
Start: 2024-06-29 | End: 2024-07-14 | Stop reason: HOSPADM

## 2024-06-29 RX ORDER — OXYCODONE HYDROCHLORIDE 5 MG/1
5 TABLET ORAL ONCE
Status: COMPLETED | OUTPATIENT
Start: 2024-06-30 | End: 2024-06-29

## 2024-06-29 RX ADMIN — OXYCODONE HYDROCHLORIDE 10 MG: 10 TABLET ORAL at 03:06

## 2024-06-29 RX ADMIN — ONDANSETRON 4 MG: 2 INJECTION INTRAMUSCULAR; INTRAVENOUS at 03:06

## 2024-06-29 RX ADMIN — CIPROFLOXACIN 400 MG: 2 INJECTION, SOLUTION INTRAVENOUS at 04:06

## 2024-06-29 RX ADMIN — LAMIVUDINE 150 MG: 150 TABLET, FILM COATED ORAL at 12:06

## 2024-06-29 RX ADMIN — ESCITALOPRAM OXALATE 20 MG: 10 TABLET ORAL at 09:06

## 2024-06-29 RX ADMIN — DIGOXIN 0.12 MG: 125 TABLET ORAL at 09:06

## 2024-06-29 RX ADMIN — LAMIVUDINE 150 MG: 150 TABLET, FILM COATED ORAL at 09:06

## 2024-06-29 RX ADMIN — MELATONIN TAB 3 MG 6 MG: 3 TAB at 11:06

## 2024-06-29 RX ADMIN — OXYCODONE HYDROCHLORIDE 10 MG: 10 TABLET ORAL at 05:06

## 2024-06-29 RX ADMIN — POTASSIUM BICARBONATE 40 MEQ: 391 TABLET, EFFERVESCENT ORAL at 07:06

## 2024-06-29 RX ADMIN — MAGNESIUM SULFATE HEPTAHYDRATE 2 G: 40 INJECTION, SOLUTION INTRAVENOUS at 09:06

## 2024-06-29 RX ADMIN — METRONIDAZOLE 500 MG: 5 INJECTION, SOLUTION INTRAVENOUS at 03:06

## 2024-06-29 RX ADMIN — OXYCODONE HYDROCHLORIDE 10 MG: 10 TABLET ORAL at 10:06

## 2024-06-29 RX ADMIN — CIPROFLOXACIN 400 MG: 2 INJECTION, SOLUTION INTRAVENOUS at 06:06

## 2024-06-29 RX ADMIN — SODIUM CHLORIDE, POTASSIUM CHLORIDE, SODIUM LACTATE AND CALCIUM CHLORIDE: 600; 310; 30; 20 INJECTION, SOLUTION INTRAVENOUS at 05:06

## 2024-06-29 RX ADMIN — OXYCODONE HYDROCHLORIDE 5 MG: 5 TABLET ORAL at 11:06

## 2024-06-29 RX ADMIN — METRONIDAZOLE 500 MG: 5 INJECTION, SOLUTION INTRAVENOUS at 07:06

## 2024-06-29 RX ADMIN — OXYCODONE HYDROCHLORIDE 5 MG: 5 TABLET ORAL at 09:06

## 2024-06-29 RX ADMIN — METRONIDAZOLE 500 MG: 5 INJECTION, SOLUTION INTRAVENOUS at 09:06

## 2024-06-29 RX ADMIN — SODIUM CHLORIDE, PRESERVATIVE FREE 10 ML: 5 INJECTION INTRAVENOUS at 09:06

## 2024-06-29 NOTE — ASSESSMENT & PLAN NOTE
Patient with intermittent nausea, vomiting and diarrhea.  Reports subjective fever.  CT abdomen with findings concerning for colitis.  Started on IV Cipro and Flagyl in the ED.    -p.r.n. antiemetics  -continue IV Cipro and Flagyl  -GI consult pending

## 2024-06-29 NOTE — ASSESSMENT & PLAN NOTE
History noted.  LFTs currently elevated from baseline with , T bili 4.1,  and .    -PT INR & hep panel pending  -consult GI, recommendations appreciated  -avoid hepatotoxic medication  -trend CMP

## 2024-06-29 NOTE — NURSING
Pt now in room 323. AAOx4 and VS stable on RA. Respirations even and non-labored, mild coarseness noted to lower bilateral lungs, and denies feeling SOB at this time. Pt belongings brought to room 323 upon transfer including: clothing, cell phone, , and bag. Pt does not wish to store belongings with security but to keep at bedside. Pt oriented to care setting and plan of care. Bed low and locked, bed alarm on, side rails up x 2 and call light within reach. Will continue to monitor.

## 2024-06-29 NOTE — PLAN OF CARE
VSS on RA and afebrile this shift.  Tolerating pain on PO.  Patient is on clear liquid diet. Repositions self independently in bed and ambulating with standby assist.   Free from injury or skin breakdown; Fall precautions maintained and call light in reach.  POC updated questions answered and comments acknowledged.      Problem: Adult Inpatient Plan of Care  Goal: Plan of Care Review  Outcome: Progressing  Goal: Patient-Specific Goal (Individualized)  Outcome: Progressing  Goal: Absence of Hospital-Acquired Illness or Injury  Outcome: Progressing  Goal: Optimal Comfort and Wellbeing  Outcome: Progressing  Goal: Readiness for Transition of Care  Outcome: Progressing     Problem: Diabetes Comorbidity  Goal: Blood Glucose Level Within Targeted Range  Outcome: Progressing     Problem: Wound  Goal: Optimal Coping  Outcome: Progressing  Goal: Optimal Functional Ability  Outcome: Progressing  Goal: Absence of Infection Signs and Symptoms  Outcome: Progressing  Goal: Improved Oral Intake  Outcome: Progressing  Goal: Optimal Pain Control and Function  Outcome: Progressing  Goal: Skin Health and Integrity  Outcome: Progressing  Goal: Optimal Wound Healing  Outcome: Progressing     Problem: Infection  Goal: Absence of Infection Signs and Symptoms  Outcome: Progressing

## 2024-06-29 NOTE — PLAN OF CARE
[facility] - Med Surg ([place])  Initial Discharge Assessment       Primary Care Provider: Tushar Drew MD    Admission Diagnosis: Weakness [R53.1]  Abdominal pain [R10.9]    Admission Date: 6/28/2024  Expected Discharge Date:     Transition of Care Barriers: (P) None    Payor: [employer] MANAGED MEDICARE / Plan: [employer] MEDICARE HMO / Product Type: Capitation /     Extended Emergency Contact Information  Primary Emergency Contact: Anjel Waters  Mobile Phone: [phone]  Relation: Relative    Discharge Plan A: (P) Home with family  Discharge Plan B: (P) Home Health      [id number] [place], [place] - [address]  [address] [place] [address]  Phone: [phone] Fax: [phone]    [facility]  [address]  [facility] [address]  Phone: [phone] Fax: [phone]    [facility]  [address]  [place] [address]  Phone: [phone] Fax: [phone]      Initial Assessment (most recent)       Adult Discharge Assessment - 06/29/24 1037          Discharge Assessment    Assessment Type Discharge Planning Assessment (P)      Confirmed/corrected address, phone number and insurance Yes (P)      Confirmed Demographics Correct on Facesheet (P)      Source of Information patient;health record (P)      People in Home other relative(s) (P)      Do you expect to return to your current living situation? Yes (P)      Do you have help at home or someone to help you manage your care at home? Yes (P)      Prior to hospitilization cognitive status: Alert/Oriented (P)      Current cognitive status: Alert/Oriented (P)      Walking or Climbing Stairs Difficulty no (P)      Dressing/Bathing Difficulty no (P)      Equipment Currently Used at Home none (P)      Readmission within 30 days? No (P)      Patient currently being followed by outpatient case management? No (P)      Do you currently have service(s) that help you manage your care at home? No (P)       Do you take prescription medications? Yes (P)      Do you have prescription coverage? Yes (P)      Do you have any problems affording any of your prescribed medications? No (P)      Is the patient taking medications as prescribed? yes (P)      How do you get to doctors appointments? car, drives self;family or friend will provide (P)      Are you on dialysis? No (P)      Do you take coumadin? No (P)      Discharge Plan A Home with family (P)      Discharge Plan B Home Health (P)      DME Needed Upon Discharge  none (P)      Discharge Plan discussed with: Patient (P)      Transition of Care Barriers None (P)         Physical Activity    On average, how many days per week do you engage in moderate to strenuous exercise (like a brisk walk)? 0 days (P)      On average, how many minutes do you engage in exercise at this level? 0 min (P)         Financial Resource Strain    How hard is it for you to pay for the very basics like food, housing, medical care, and heating? Not very hard (P)         Housing Stability    In the last 12 months, was there a time when you were not able to pay the mortgage or rent on time? No (P)      At any time in the past 12 months, were you homeless or living in a shelter (including now)? No (P)         Transportation Needs    Has the lack of transportation kept you from medical appointments, meetings, work or from getting things needed for daily living? No (P)         Food Insecurity    Within the past 12 months, you worried that your food would run out before you got the money to buy more. Never true (P)      Within the past 12 months, the food you bought just didn't last and you didn't have money to get more. Never true (P)         Stress    Do you feel stress - tense, restless, nervous, or anxious, or unable to sleep at night because your mind is troubled all the time - these days? Only a little (P)         Social Isolation    How often do you feel lonely or isolated from those around you?   Never (P)         Alcohol Use    Q1: How often do you have a drink containing alcohol? Never (P)      Q2: How many drinks containing alcohol do you have on a typical day when you are drinking? Patient does not drink (P)      Q3: How often do you have six or more drinks on one occasion? Never (P)         Utilities    In the past 12 months has the electric, gas, oil, or water company threatened to shut off services in your home? No (P)         Health Literacy    How often do you need to have someone help you when you read instructions, pamphlets, or other written material from your doctor or pharmacy? Never (P)                    Case Management Assessment     PCP: [facility] in [facility]   Pharmacy: Bedside     Patient Arrived From: Home   Existing Help at Home: Family     Barriers to Discharge: None     Discharge Plan:    A. Home with family    B. Home health

## 2024-06-29 NOTE — H&P
"  [facility] Medicine  History & Physical    Patient Name: Nithin Wagner  MRN: [id number]  Patient Class: OP- Observation  Admission Date: 6/28/2024  Attending Physician: Darby Obrien MD   Primary Care Provider: Tushar Drew MD         Patient information was obtained from patient, past medical records, and ER records.     Subjective:     Principal Problem:Colitis    Chief Complaint:   Chief Complaint   Patient presents with    Weakness     Pt generalized weakness and dehydration. Pt was seen in ED a few days ago but states he has not gotten any better and "was told to come back"        HPI: Nithin Wagner is a 68 y.o. male with a past medical history of hepatitis-B, hypertension, diastolic heart failure, diabetes, rheumatoid arthritis, hyponatremia, s/p s/p T11 and L3 kyphoplasty on 4/22/2024 who presents to the ED with lower abdominal pain recurrent onset 4 days ago.  Patient reports associated nausea and vomiting and states he has been experiencing intermittent diarrhea over the past several months.  He denies blood in stool.  Patient  was seen here on i 06/26/2024 for emesis and weakness. Workup showed findings suggestive of colitis and dehydration. He was offered admission at that time but he stated he would prefer to go home. Stated he went home and was unable to hold down significant fluids and came back.  Reports subjective fever a few days ago.  Had a normal bowel movement this morning     ED workup significant for unremarkable CBC, mild hyponatremia 132, T bili 4.1, albumin 1.6,  and .  Lactic acid 1.2.  CT abdomen performed 06/26/2024 showed diffuse colitis.  Patient was started on IV Cipro and Flagyl in the ED and referred to Hospital Medicine.  Patient will be admitted for further evaluation and management.    Past Medical History:   Diagnosis Date    Arthritis     Atrial myxoma     Coronary atherosclerosis 2020    stents x 3    Diabetes mellitus, type 2     " Difficult intubation     Heart failure     Hepatitis B     HPV (human papilloma virus) infection     Hyperlipidemia     Hypertension     Non-alcoholic fatty liver disease     Rheumatoid arthritis     Rheumatoid arthritis flare 07/12/2021    Squamous cell carcinoma of forehead 10/26/2023    forehead    Stroke     TIA       Past Surgical History:   Procedure Laterality Date    CATARACT EXTRACTION W/  INTRAOCULAR LENS IMPLANT Right 3/28/2024    Procedure: EXTRACTION, CATARACT, WITH IOL INSERTION;  Surgeon: Hans Woodward MD;  Location: [facility] OR;  Service: Ophthalmology;  Laterality: Right;    CORONARY ANGIOGRAPHY N/A 3/10/2021    Procedure: ANGIOGRAM, CORONARY ARTERY - right radial;  Surgeon: Shemar Dempsey MD;  Location: [facility] CATH LAB;  Service: Cardiology;  Laterality: N/A;    CORONARY STENT PLACEMENT  03/10/2021    prox-mid RCA Phelps 4.5 x 26 mm, 4.5 x 12 mm    FIXATION KYPHOPLASTY N/A 4/22/2024    Procedure: KYPHOPLASTY;  Surgeon: Stefan Stern MD;  Location: [facility] OR;  Service: Pain Management;  Laterality: N/A;  NO PROPOFOL    INCISION AND DRAINAGE OF SCROTUM N/A 11/15/2022    Procedure: INCISION AND DRAINAGE, SCROTUM;  Surgeon: William Hatch MD;  Location: [facility] OR;  Service: Urology;  Laterality: N/A;    INCISION AND DRAINAGE OF SCROTUM N/A 11/17/2022    Procedure: INCISION AND DRAINAGE, SCROTUM;  Surgeon: William Hatch MD;  Location: [facility];  Service: Urology;  Laterality: N/A;    LUNG LOBECTOMY Right 2008    RUL lobectomy after removal of atrial myxoma    MAGNETIC RESONANCE IMAGING N/A 4/30/2024    Procedure: MRI (Magnetic Resonance Imagine);  Surgeon: Peace Watts;  Location: [facility];  Service: Anesthesiology;  Laterality: N/A;    PLEURA BIOPSY      RESECTION OF ATRIAL MYXOMA  2007       Review of patient's allergies indicates:   Allergen Reactions    Enbrel [etanercept] Shortness Of Breath     CHF    Nsaids (non-steroidal anti-inflammatory drug) Other (See Comments)     hypertention    Other  "reaction(s): Other (See Comments)    HTN    Patient states he tolerates anti inflammatory eye drops    Statins-hmg-coa reductase inhibitors Other (See Comments)     Heart arrythemias    Other reaction(s): Other (See Comments)    Joint pain and cardiac arrythmias    Pcn [penicillins] Rash       No current facility-administered medications on file prior to encounter.     Current Outpatient Medications on File Prior to Encounter   Medication Sig    diclofenac sodium (SOLARAZE) 3 % gel Apply to affected area three times a day.    digoxin (LANOXIN) 125 mcg tablet TAKE 1 TABLET BY MOUTH ONCE DAILY.    ergocalciferol (ERGOCALCIFEROL) 50,000 unit Cap Take 1 capsule (50,000 Units total) by mouth every 7 days.    EScitalopram oxalate (LEXAPRO) 20 MG tablet Take 20 mg by mouth every evening.    gabapentin (NEURONTIN) 400 MG capsule Take 1 capsule (400 mg total) by mouth 3 (three) times daily.    lamiVUDine (EPIVIR) 150 MG Tab TAKE 1 TABLET BY MOUTH EVERY DAY    multivitamin (THERAGRAN) per tablet Take 1 tablet by mouth once daily.    nitroGLYCERIN (NITROSTAT) 0.4 MG SL tablet PLACE 1 TABLET UNDER THE TONGUE EVERY 5 MINUTES AS NEEDED FOR CHEST PAIN.    oxyCODONE (ROXICODONE) 10 mg Tab immediate release tablet Take 1 tablet (10 mg total) by mouth every 6 (six) hours as needed severe pain, medically necessary greater than 7 days    aspirin (ECOTRIN) 81 MG EC tablet Take 1 tablet (81 mg total) by mouth once daily.    BD ULTRA-FINE RAHUL PEN NEEDLE 32 gauge x 5/32" Ndle To use 6 daily    folic acid (FOLVITE) 1 MG tablet Take 1 tablet (1 mg total) by mouth once daily.    HYDROcodone-acetaminophen 7.5-300 mg Tab take 1 tablet by mouth every 6 hours as needed for pain    HYDROcodone-acetaminophen 7.5-300 mg Tab Take 1 tablet by mouth every 6 (six) hours as needed for pain.    insulin pen,reusable,BT,aspart (INPEN, NOVOLOG OR FIASP, PINK) InPn To use with inpen cartridges    ketoconazole (NIZORAL) 2 % cream Apply to affected area DAILY "    Lactobacillus rhamnosus GG (CULTURELLE) 10 billion cell capsule Take 1 capsule by mouth once daily.    LIDOcaine (LIDODERM) 5 % Place 1 patch onto the skin once daily. Remove & Discard patch within 12 hours or as directed by MD    naloxone (NARCAN) 4 mg/actuation Spry USE AS DIRECTED INTRANASAL FOR SUSPECTED DRUG OVERDOSE    nystatin (MYCOSTATIN) powder Apply topically 4 (four) times daily. Apply to intertriginous areas as directed up to four times daily to reduce moisture. for 14 days    oxyCODONE (ROXICODONE) 10 mg Tab immediate release tablet Take 1 tablet by mouth every 8 hours as needed severe pain, medically necessary greater than 7 days; HOLD HYDROCODONE/ACETAMENOPHEN    oxyCODONE (ROXICODONE) 10 mg Tab immediate release tablet Take 1 tablet by mouth every 6 hours as needed for severe pain, medically necessary greater than 7 day supply    polyethylene glycol (GLYCOLAX) 17 gram/dose powder Use cap to measure 17 grams, mix in liquid and take by mouth once daily.    PROAIR HFA 90 mcg/actuation inhaler     tirzepatide 7.5 mg/0.5 mL PnIj Inject 7.5 mg into the skin every 7 days.    [DISCONTINUED] walker Misc 1 Units by Misc.(Non-Drug; Combo Route) route daily as needed (Rollator Walker).     Family History       Problem Relation (Age of Onset)    Cancer Sister    Diabetes type I Father    Diabetes type II Mother    Hodgkin's lymphoma Mother    Kidney failure Father          Tobacco Use    Smoking status: Every Day     Current packs/day: 1.00     Average packs/day: 1 pack/day for 35.0 years (35.0 ttl pk-yrs)     Types: Cigarettes    Smokeless tobacco: Never   Substance and Sexual Activity    Alcohol use: Not Currently    Drug use: No    Sexual activity: Never     Review of Systems   Constitutional:  Negative for activity change, appetite change, chills and fever.   HENT:  Negative for congestion, sore throat and trouble swallowing.    Eyes:  Negative for photophobia and visual disturbance.   Respiratory:   Negative for cough, chest tightness and shortness of breath.    Cardiovascular:  Negative for chest pain, palpitations and leg swelling.   Gastrointestinal:  Positive for abdominal pain, nausea and vomiting. Negative for diarrhea.   Genitourinary:  Negative for dysuria, flank pain and hematuria.   Musculoskeletal:  Negative for back pain.   Neurological:  Negative for dizziness, weakness and headaches.   Psychiatric/Behavioral:  Negative for confusion.      Objective:     Vital Signs (Most Recent):  Temp: 97.5 °F (36.4 °C) (06/28/24 2037)  Pulse: 83 (06/28/24 2326)  Resp: 18 (06/28/24 2257)  BP: 133/85 (06/28/24 2030)  SpO2: 95 % (06/28/24 2030) Vital Signs (24h Range):  Temp:  [97.5 °F (36.4 °C)-98.2 °F (36.8 °C)] 97.5 °F (36.4 °C)  Pulse:  [] 83  Resp:  [16-20] 18  SpO2:  [95 %-98 %] 95 %  BP: ()/(53-85) 133/85     Weight: 80.6 kg (177 lb 11.1 oz)  Body mass index is 24.1 kg/m².     Physical Exam  Vitals reviewed.   Constitutional:       Appearance: He is normal weight. He is ill-appearing (chronically).   HENT:      Head: Normocephalic.      Mouth/Throat:      Mouth: Mucous membranes are moist.      Pharynx: Oropharynx is clear.   Eyes:      General: Lids are normal. Gaze aligned appropriately.      Conjunctiva/sclera: Conjunctivae normal.   Cardiovascular:      Rate and Rhythm: Normal rate and regular rhythm.      Pulses: Normal pulses.      Heart sounds: Normal heart sounds.   Pulmonary:      Effort: Pulmonary effort is normal.      Breath sounds: Decreased breath sounds present.   Abdominal:      General: Bowel sounds are normal.      Palpations: Abdomen is soft.      Tenderness: There is generalized abdominal tenderness.   Musculoskeletal:         General: Normal range of motion.      Cervical back: Normal range of motion.      Right lower leg: Edema present.      Left lower leg: Edema present.   Skin:     General: Skin is warm and dry.   Neurological:      Mental Status: He is alert and oriented  to person, place, and time. Mental status is at baseline.   Psychiatric:         Mood and Affect: Mood normal.                Significant Labs: All pertinent labs within the past 24 hours have been reviewed.  CBC:   Recent Labs   Lab 06/28/24  1606   WBC 5.21   HGB 13.8*   HCT 40.4        CMP:   Recent Labs   Lab 06/28/24  1606   *   K 3.5      CO2 21*   *   BUN 9   CREATININE 0.7   CALCIUM 7.6*   PROT 5.2*   ALBUMIN 1.6*   BILITOT 4.1*   ALKPHOS 476*   *   *   ANIONGAP 10       Significant Imaging: I have reviewed all pertinent imaging results/findings within the past 24 hours.    Assessment/Plan:     * Colitis  Patient with intermittent nausea, vomiting and diarrhea.  Reports subjective fever.  CT abdomen with findings concerning for colitis.  Started on IV Cipro and Flagyl in the ED.    -p.r.n. antiemetics  -continue IV Cipro and Flagyl  -GI consult pending      NAFLD (nonalcoholic fatty liver disease)  History noted.  LFTs currently elevated from baseline with , T bili 4.1,  and .    -PT INR & hep panel pending  -consult GI, recommendations appreciated  -avoid hepatotoxic medication  -trend CMP      SVT (supraventricular tachycardia)  History SVT noted.  Followed by Cardiology, Dr. Doshi, outpatient.  States he takes digoxin and has had no recent episodes of SVT    -continue digoxin  -Continue to monitor on telemetry        Type 2 diabetes mellitus, with long-term current use of insulin  History noted.  Most recent A1c 7.4 performed 03/2024.  Patient has not been taking insulin and was on tirzepeptide and stopped taking in April    -low-dose SSI  Accu-Chek      Chronic diastolic heart failure  History noted.  Most recent echo performed 04/03/2024 showed EF 55-60%.  Patient states he no longer takes Lasix.  Lower extremity edema noted, patient denies shortness of breath    -continue to monitor on telemetry  -follow-up with Cardiology as scheduled  -BNP  pending      Hepatitis B core antibody positive  Patient states he was exposed to hep B while working in [place] in the 1990s and is on prophylaxis with lamivudine     -continue lamivudine daily      VTE Risk Mitigation (From admission, onward)           Ordered     IP VTE HIGH RISK PATIENT  Once         06/28/24 2107     Place sequential compression device  Until discontinued         06/28/24 2107                         On 06/28/2024, patient should be placed in hospital observation services        Valeria Patel NP  Department of Hospital Medicine  [facility] - Med Surg ([place])

## 2024-06-29 NOTE — ASSESSMENT & PLAN NOTE
History SVT noted.  Followed by Cardiology, Dr. Doshi, outpatient.  States he takes digoxin and has had no recent episodes of SVT    -continue digoxin  -Continue to monitor on telemetry

## 2024-06-29 NOTE — ASSESSMENT & PLAN NOTE
History noted.  Most recent echo performed 04/03/2024 showed EF 55-60%.  Patient states he no longer takes Lasix.  Lower extremity edema noted, patient denies shortness of breath    -continue to monitor on telemetry  -follow-up with Cardiology as scheduled  -BNP pending

## 2024-06-29 NOTE — CONSULTS
Gastroenterology Consult    6/29/2024 11:34 AM    Patient Name: Nithin Wagner  MRN: [id number]  Admission Date: 6/28/2024  Hospital Length of Stay: 0 days  Code Status: Full Code   Primary Care Physician: Tushar Drew MD  Principal Problem:Colitis  Consulting Physician: Inpatient consult to Gastroenterology  Consult performed by: Janet Win MD  Consult ordered by: Valeria Patel NP        Reason for consultation: abnormal LFTs    HPI:  Nithin Wagner is a 68 y.o. male with a history of HTN, DM2, HLD, Hep B? (Surface Ag negative..), RA, NAFLD, prior CVA, CAD s/p PCI and diastolic CHF w/ atrial myxoma who presented with weakness and dehydration.  He was recent admitted with similar symptoms.  He has had N/V and diarrhea on and off over the last several months since he was admitted to a rehab facility after kyphoplasty.  He was weaning off of oxycontin.  He had been on narcotics for the last 10 years for arthritis pain.  Last admission a few days ago, CT was suggestive of colitis, gastroduodenitis and enteritis.  This admission LFTs are elevated.  No new medications.  Denies heavy tylenol use.  No alcohol in the last few years, but previously did drink more heavily.  He was started on antibiotics.  U/S shows a distended gallbladder but no stones.      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    forehead    Stroke     TIA       Past Surgical History:   Procedure Laterality Date    CATARACT EXTRACTION W/  INTRAOCULAR LENS IMPLANT Right 3/28/2024    Procedure: EXTRACTION, CATARACT, WITH IOL INSERTION;  Surgeon: Trace  Hans ROLDAN MD;  Location: [facility] OR;  Service: Ophthalmology;  Laterality: Right;    CORONARY ANGIOGRAPHY N/A 3/10/2021    Procedure: ANGIOGRAM, CORONARY ARTERY - right radial;  Surgeon: Shemar Dempsey MD;  Location: [facility] CATH LAB;  Service: Cardiology;  Laterality: N/A;    CORONARY STENT PLACEMENT  03/10/2021    prox-mid RCA Marshal 4.5 x 26 mm, 4.5 x 12 mm    FIXATION KYPHOPLASTY N/A 4/22/2024    Procedure: KYPHOPLASTY;  Surgeon: Stefan Stern MD;  Location: [facility] OR;  Service: Pain Management;  Laterality: N/A;  NO PROPOFOL    INCISION AND DRAINAGE OF SCROTUM N/A 11/15/2022    Procedure: INCISION AND DRAINAGE, SCROTUM;  Surgeon: William Hatch MD;  Location: [facility] OR;  Service: Urology;  Laterality: N/A;    INCISION AND DRAINAGE OF SCROTUM N/A 11/17/2022    Procedure: INCISION AND DRAINAGE, SCROTUM;  Surgeon: William Hatch MD;  Location: [facility];  Service: Urology;  Laterality: N/A;    LUNG LOBECTOMY Right 2008    RUL lobectomy after removal of atrial myxoma    MAGNETIC RESONANCE IMAGING N/A 4/30/2024    Procedure: MRI (Magnetic Resonance Imagine);  Surgeon: Peace Watts;  Location: [facility];  Service: Anesthesiology;  Laterality: N/A;    PLEURA BIOPSY      RESECTION OF ATRIAL MYXOMA  2007       Social History     Tobacco Use    Smoking status: Every Day     Current packs/day: 1.00     Average packs/day: 1 pack/day for 35.0 years (35.0 ttl pk-yrs)     Types: Cigarettes    Smokeless tobacco: Never   Substance Use Topics    Alcohol use: Not Currently    Drug use: No        Family History   Problem Relation Name Age of Onset    Hodgkin's lymphoma Mother      Diabetes type II Mother      Kidney failure Father      Diabetes type I Father      Cancer Sister           Review of patient's allergies indicates:   Allergen Reactions    Enbrel [etanercept] Shortness Of Breath     CHF    Nsaids (non-steroidal anti-inflammatory drug) Other (See Comments)     hypertention    Other reaction(s): Other (See  Comments)    HTN    Patient states he tolerates anti inflammatory eye drops    Statins-hmg-coa reductase inhibitors Other (See Comments)     Heart arrythemias    Other reaction(s): Other (See Comments)    Joint pain and cardiac arrythmias    Pcn [penicillins] Rash         Current Facility-Administered Medications:     acetaminophen tablet 650 mg, 650 mg, Oral, Q8H PRN, Valeria Patel, NP    ciprofloxacin (CIPRO)400mg/200ml D5W IVPB 400 mg, 400 mg, Intravenous, Q12H, Valeria Patel NP, Stopped at 06/29/24 0732    dextrose 10% bolus 125 mL 125 mL, 12.5 g, Intravenous, PRN, Valeria Patel, NP    dextrose 10% bolus 250 mL 250 mL, 25 g, Intravenous, PRN, Valeria Patel, NP    digoxin tablet 0.125 mg, 0.125 mg, Oral, Daily, Valeria Patel, NP, 0.125 mg at 06/29/24 0910    EScitalopram oxalate tablet 20 mg, 20 mg, Oral, QHS, Valeria Patel NP, 20 mg at 06/28/24 2203    glucagon (human recombinant) injection 1 mg, 1 mg, Intramuscular, PRN, Valeria Patel NP    glucose chewable tablet 16 g, 16 g, Oral, PRN, Valeria Patel NP    glucose chewable tablet 24 g, 24 g, Oral, PRN, Valeria Patel, NP    insulin aspart U-100 pen 0-5 Units, 0-5 Units, Subcutaneous, QID (AC + HS) PRN, Valeria Patel NP    lactated ringers infusion, , Intravenous, Continuous, Darby Obrien MD    lamiVUDine tablet 150 mg, 150 mg, Oral, QHS, Valeria Patel NP, 150 mg at 06/29/24 0050    LIDOcaine 5 % patch 1 patch, 1 patch, Transdermal, Q24H, Valeria Patel NP, 1 patch at 06/28/24 2203    melatonin tablet 6 mg, 6 mg, Oral, Nightly PRN, Valeria Patel NP    metronidazole IVPB 500 mg, 500 mg, Intravenous, Q8H, Valeria Patel NP, Stopped at 06/29/24 0841    naloxone 0.4 mg/mL injection 0.02 mg, 0.02 mg, Intravenous, PRN, Valeria Patel, NP    ondansetron injection 4 mg, 4 mg, Intravenous, Q6H PRN, Valeria Patel NP, 4 mg at 06/29/24 0317    oxyCODONE immediate release tablet 5 mg, 5  mg, Oral, Q6H PRN, Valeria Patel, NP, 5 mg at 06/28/24 2257    oxyCODONE immediate release tablet Tab 10 mg, 10 mg, Oral, Q6H PRN, Valeria Patel, NP, 10 mg at 06/29/24 1033    prochlorperazine injection Soln 5 mg, 5 mg, Intravenous, Q6H PRN, Valeria Patel, NP    sodium chloride 0.9% flush 10 mL, 10 mL, Intravenous, Q8H PRN, Valeria Patel, NP    Review of Systems   Constitutional:  Positive for malaise/fatigue.   Gastrointestinal:  Positive for abdominal pain, diarrhea, nausea and vomiting.   Musculoskeletal:  Positive for back pain.   All other systems reviewed and are negative.      BP (!) 99/58 (BP Location: Left arm, Patient Position: Lying)   Pulse 87   Temp 97.6 °F (36.4 °C) (Oral)   Resp 16   Ht 6' (1.829 m)   Wt 80.6 kg (177 lb 11.1 oz)   SpO2 95%   BMI 24.10 kg/m²   Physical Exam  Vitals reviewed.   Constitutional:       General: He is not in acute distress.     Appearance: Normal appearance. He is well-developed.   HENT:      Head: Normocephalic and atraumatic.      Right Ear: External ear normal.      Left Ear: External ear normal.      Nose: Nose normal. No congestion or rhinorrhea.      Mouth/Throat:      Mouth: Mucous membranes are moist.      Pharynx: Oropharynx is clear. No oropharyngeal exudate.   Eyes:      General: No scleral icterus.     Pupils: Pupils are equal, round, and reactive to light.   Cardiovascular:      Rate and Rhythm: Normal rate and regular rhythm.      Heart sounds: Normal heart sounds. No murmur heard.  Pulmonary:      Effort: Pulmonary effort is normal. No respiratory distress.      Breath sounds: Normal breath sounds.   Abdominal:      General: Bowel sounds are normal. There is no distension.      Palpations: Abdomen is soft.      Tenderness: There is abdominal tenderness (mild, mostly epigastric and RUQ). There is no guarding or rebound.   Musculoskeletal:         General: Normal range of motion.   Skin:     General: Skin is warm and dry.       Findings: No rash.   Neurological:      General: No focal deficit present.      Mental Status: He is alert and oriented to person, place, and time. Mental status is at baseline.   Psychiatric:         Mood and Affect: Mood normal.         Thought Content: Thought content normal.         Labs:  Lab Results   Component Value Date/Time    WBC 4.96 06/29/2024 04:43 AM    HGB 12.1 (L) 06/29/2024 04:43 AM    HCT 34.8 (L) 06/29/2024 04:43 AM    HCT 52 04/26/2024 05:18 PM     06/29/2024 04:43 AM    MCV 88 06/29/2024 04:43 AM     (L) 06/29/2024 04:43 AM    K 3.3 (L) 06/29/2024 04:43 AM     06/29/2024 04:43 AM    CO2 22 (L) 06/29/2024 04:43 AM    CO2 11 (H) 11/04/2021 04:34 PM    BUN 8 06/29/2024 04:43 AM    CREATININE 0.6 06/29/2024 04:43 AM    GLU 93 06/29/2024 04:43 AM    CALCIUM 7.1 (L) 06/29/2024 04:43 AM    MG 1.6 06/29/2024 04:43 AM    PHOS 2.8 06/26/2024 11:02 PM    INR 1.4 (H) 06/28/2024 10:31 PM    APTT 28.8 06/28/2024 10:31 PM   ]  Lab Results   Component Value Date/Time    PROT 4.4 (L) 06/29/2024 04:43 AM    ALBUMIN 1.3 (L) 06/29/2024 04:43 AM    ALBUMIN 4.1 06/21/2022 07:23 AM    BILITOT 3.3 (H) 06/29/2024 04:43 AM    BILIDIR 0.2 01/06/2023 04:42 AM     (H) 06/29/2024 04:43 AM    ALT 92 (H) 06/29/2024 04:43 AM    ALKPHOS 416 (H) 06/29/2024 04:43 AM   ]    Imaging and Procedures:  I personally reviewed the imaging/procedures below.  RUQ U/S 6/28/24:  Distended gallbladder.  No gallstones or ultrasonographic evidence to suggest acute cholecystitis.  See CT abdomen pelvis report from yesterday for full intra-abdominal details.    CT A/P 6/27/24:  Diffuse colitis, of numerous possible etiologies.  Correlate clinically.  Gastroduodenitis, likely with some proximal jejunal enteritis.  Small quantity of intraperitoneal free fluid.  No pneumoperitoneum.  Bilateral nonobstructing intrarenal calculi.  Profound diffuse hepatic steatosis.  Correlate clinically.  Consider outpatient referral to a  hepatology/gastroenterology.  Bibasilar pulmonary opacities similar to 06/02/2024, possibly representing subsegmental atelectasis and scarring.  Infectious, inflammatory, or neoplastic process is not excluded.  Correlate clinically, and with follow-up outpatient chest CT within 3-6 months.    Assessment:  Nithin Wagner is a 68 y.o. male with a history of HTN, DM2, HLD, Hep B - cleared (Surface Ag neg), RA, NAFLD, prior CVA, CAD s/p PCI and diastolic CHF w/ atrial myxoma who presented with weakness, N/V, diarrhea and dehydration.  GI consulted for abnormal LFTs.       Plan:  - daily LFTs - will check direct bilirubin and GGT  - viral hep negative, NITIN negative  - avoid hepatotoxic meds  - check MRCP to rule out distal CBD stone/pancreatic head mass or ampullary lesion  - if MRCP negative for obstruction, can do other abnl LFT w/u with iron studies, AMA, ASMA, IgG, etc    Janet Win MD

## 2024-06-29 NOTE — HPI
Nithin Wagner is a 68 y.o. male with a past medical history of hepatitis-B, hypertension, diastolic heart failure, diabetes, rheumatoid arthritis, hyponatremia, s/p s/p T11 and L3 kyphoplasty on 4/22/2024 who presents to the ED with lower abdominal pain recurrent onset 4 days ago.  Patient reports associated nausea and vomiting and states he has been experiencing intermittent diarrhea over the past several months.  He denies blood in stool.  Patient  was seen here on i 06/26/2024 for emesis and weakness. Workup showed findings suggestive of colitis and dehydration. He was offered admission at that time but he stated he would prefer to go home. Stated he went home and was unable to hold down significant fluids and came back.  Reports subjective fever a few days ago.  Had a normal bowel movement this morning     ED workup significant for unremarkable CBC, mild hyponatremia 132, T bili 4.1, albumin 1.6,  and .  Lactic acid 1.2.  CT abdomen performed 06/26/2024 showed diffuse colitis.  Patient was started on IV Cipro and Flagyl in the ED and referred to Hospital Medicine.  Patient will be admitted for further evaluation and management.

## 2024-06-29 NOTE — PLAN OF CARE
Pt remained free of falls and injuries throughout shift. AAOx4. Purposeful hourly rounding performed. Pt swallows meds whole. Pt received IV Cipro, IV Flagyl infusing at this time. Managed c/o R hip and bilateral lower back pain with PRN oxycodone and scheduled lidocaine patch. Patient ambulates with X1 assist, urinal at bedside. UA still needs to be collected. Pt bladder scanned, showed 179 mL. VSS on room air. Pt resting comfortably in bed, denies needs at this time. Bed low and locked, bed alarm on, call light in reach. Side rails up x2. Report given to BE Pierre.

## 2024-06-29 NOTE — ASSESSMENT & PLAN NOTE
History noted.  Most recent A1c 7.4 performed 03/2024.  Patient has not been taking insulin and was on tirzepeptide and stopped taking in April    -low-dose SSI  Accu-Chek

## 2024-06-29 NOTE — SUBJECTIVE & OBJECTIVE
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    forehead    Stroke     TIA       Past Surgical History:   Procedure Laterality Date    CATARACT EXTRACTION W/  INTRAOCULAR LENS IMPLANT Right 3/28/2024    Procedure: EXTRACTION, CATARACT, WITH IOL INSERTION;  Surgeon: Hans Woodward MD;  Location: [facility] OR;  Service: Ophthalmology;  Laterality: Right;    CORONARY ANGIOGRAPHY N/A 3/10/2021    Procedure: ANGIOGRAM, CORONARY ARTERY - right radial;  Surgeon: Shemar Dempsey MD;  Location: [facility] CATH LAB;  Service: Cardiology;  Laterality: N/A;    CORONARY STENT PLACEMENT  03/10/2021    prox-mid RCA Marshal 4.5 x 26 mm, 4.5 x 12 mm    FIXATION KYPHOPLASTY N/A 4/22/2024    Procedure: KYPHOPLASTY;  Surgeon: Stefan Stern MD;  Location: [facility] OR;  Service: Pain Management;  Laterality: N/A;  NO PROPOFOL    INCISION AND DRAINAGE OF SCROTUM N/A 11/15/2022    Procedure: INCISION AND DRAINAGE, SCROTUM;  Surgeon: William Hatch MD;  Location: [facility] OR;  Service: Urology;  Laterality: N/A;    INCISION AND DRAINAGE OF SCROTUM N/A 11/17/2022    Procedure: INCISION AND DRAINAGE, SCROTUM;  Surgeon: William Hatch MD;  Location: [facility] OR;  Service: Urology;  Laterality: N/A;    LUNG LOBECTOMY Right 2008    RUL lobectomy after removal of atrial myxoma    MAGNETIC RESONANCE IMAGING N/A 4/30/2024    Procedure: MRI (Magnetic Resonance Imagine);  Surgeon: Peace Watts;  Location: [facility];  Service: Anesthesiology;  Laterality: N/A;    PLEURA BIOPSY      RESECTION OF ATRIAL MYXOMA  2007       Review of patient's allergies indicates:   Allergen Reactions  "   Enbrel [etanercept] Shortness Of Breath     CHF    Nsaids (non-steroidal anti-inflammatory drug) Other (See Comments)     hypertention    Other reaction(s): Other (See Comments)    HTN    Patient states he tolerates anti inflammatory eye drops    Statins-hmg-coa reductase inhibitors Other (See Comments)     Heart arrythemias    Other reaction(s): Other (See Comments)    Joint pain and cardiac arrythmias    Pcn [penicillins] Rash       No current facility-administered medications on file prior to encounter.     Current Outpatient Medications on File Prior to Encounter   Medication Sig    diclofenac sodium (SOLARAZE) 3 % gel Apply to affected area three times a day.    digoxin (LANOXIN) 125 mcg tablet TAKE 1 TABLET BY MOUTH ONCE DAILY.    ergocalciferol (ERGOCALCIFEROL) 50,000 unit Cap Take 1 capsule (50,000 Units total) by mouth every 7 days.    EScitalopram oxalate (LEXAPRO) 20 MG tablet Take 20 mg by mouth every evening.    gabapentin (NEURONTIN) 400 MG capsule Take 1 capsule (400 mg total) by mouth 3 (three) times daily.    lamiVUDine (EPIVIR) 150 MG Tab TAKE 1 TABLET BY MOUTH EVERY DAY    multivitamin (THERAGRAN) per tablet Take 1 tablet by mouth once daily.    nitroGLYCERIN (NITROSTAT) 0.4 MG SL tablet PLACE 1 TABLET UNDER THE TONGUE EVERY 5 MINUTES AS NEEDED FOR CHEST PAIN.    oxyCODONE (ROXICODONE) 10 mg Tab immediate release tablet Take 1 tablet (10 mg total) by mouth every 6 (six) hours as needed severe pain, medically necessary greater than 7 days    aspirin (ECOTRIN) 81 MG EC tablet Take 1 tablet (81 mg total) by mouth once daily.    BD ULTRA-FINE RAHUL PEN NEEDLE 32 gauge x 5/32" Ndle To use 6 daily    folic acid (FOLVITE) 1 MG tablet Take 1 tablet (1 mg total) by mouth once daily.    HYDROcodone-acetaminophen 7.5-300 mg Tab take 1 tablet by mouth every 6 hours as needed for pain    HYDROcodone-acetaminophen 7.5-300 mg Tab Take 1 tablet by mouth every 6 (six) hours as needed for pain.    insulin " pen,reusable,BT,aspart (INPEN, NOVOLOG OR FIASP, PINK) InPn To use with inpen cartridges    ketoconazole (NIZORAL) 2 % cream Apply to affected area DAILY    Lactobacillus rhamnosus GG (CULTURELLE) 10 billion cell capsule Take 1 capsule by mouth once daily.    LIDOcaine (LIDODERM) 5 % Place 1 patch onto the skin once daily. Remove & Discard patch within 12 hours or as directed by MD    naloxone (NARCAN) 4 mg/actuation Spry USE AS DIRECTED INTRANASAL FOR SUSPECTED DRUG OVERDOSE    nystatin (MYCOSTATIN) powder Apply topically 4 (four) times daily. Apply to intertriginous areas as directed up to four times daily to reduce moisture. for 14 days    oxyCODONE (ROXICODONE) 10 mg Tab immediate release tablet Take 1 tablet by mouth every 8 hours as needed severe pain, medically necessary greater than 7 days; HOLD HYDROCODONE/ACETAMENOPHEN    oxyCODONE (ROXICODONE) 10 mg Tab immediate release tablet Take 1 tablet by mouth every 6 hours as needed for severe pain, medically necessary greater than 7 day supply    polyethylene glycol (GLYCOLAX) 17 gram/dose powder Use cap to measure 17 grams, mix in liquid and take by mouth once daily.    PROAIR HFA 90 mcg/actuation inhaler     tirzepatide 7.5 mg/0.5 mL PnIj Inject 7.5 mg into the skin every 7 days.    [DISCONTINUED] walker Misc 1 Units by Misc.(Non-Drug; Combo Route) route daily as needed (Rollator Alexsander).     Family History       Problem Relation (Age of Onset)    Cancer Sister    Diabetes type I Father    Diabetes type II Mother    Hodgkin's lymphoma Mother    Kidney failure Father          Tobacco Use    Smoking status: Every Day     Current packs/day: 1.00     Average packs/day: 1 pack/day for 35.0 years (35.0 ttl pk-yrs)     Types: Cigarettes    Smokeless tobacco: Never   Substance and Sexual Activity    Alcohol use: Not Currently    Drug use: No    Sexual activity: Never     Review of Systems   Constitutional:  Negative for activity change, appetite change, chills and  fever.   HENT:  Negative for congestion, sore throat and trouble swallowing.    Eyes:  Negative for photophobia and visual disturbance.   Respiratory:  Negative for cough, chest tightness and shortness of breath.    Cardiovascular:  Negative for chest pain, palpitations and leg swelling.   Gastrointestinal:  Positive for abdominal pain, nausea and vomiting. Negative for diarrhea.   Genitourinary:  Negative for dysuria, flank pain and hematuria.   Musculoskeletal:  Negative for back pain.   Neurological:  Negative for dizziness, weakness and headaches.   Psychiatric/Behavioral:  Negative for confusion.      Objective:     Vital Signs (Most Recent):  Temp: 97.5 °F (36.4 °C) (06/28/24 2037)  Pulse: 83 (06/28/24 2326)  Resp: 18 (06/28/24 2257)  BP: 133/85 (06/28/24 2030)  SpO2: 95 % (06/28/24 2030) Vital Signs (24h Range):  Temp:  [97.5 °F (36.4 °C)-98.2 °F (36.8 °C)] 97.5 °F (36.4 °C)  Pulse:  [] 83  Resp:  [16-20] 18  SpO2:  [95 %-98 %] 95 %  BP: ()/(53-85) 133/85     Weight: 80.6 kg (177 lb 11.1 oz)  Body mass index is 24.1 kg/m².     Physical Exam  Vitals reviewed.   Constitutional:       Appearance: He is normal weight. He is ill-appearing (chronically).   HENT:      Head: Normocephalic.      Mouth/Throat:      Mouth: Mucous membranes are moist.      Pharynx: Oropharynx is clear.   Eyes:      General: Lids are normal. Gaze aligned appropriately.      Conjunctiva/sclera: Conjunctivae normal.   Cardiovascular:      Rate and Rhythm: Normal rate and regular rhythm.      Pulses: Normal pulses.      Heart sounds: Normal heart sounds.   Pulmonary:      Effort: Pulmonary effort is normal.      Breath sounds: Decreased breath sounds present.   Abdominal:      General: Bowel sounds are normal.      Palpations: Abdomen is soft.      Tenderness: There is generalized abdominal tenderness.   Musculoskeletal:         General: Normal range of motion.      Cervical back: Normal range of motion.      Right lower leg:  Edema present.      Left lower leg: Edema present.   Skin:     General: Skin is warm and dry.   Neurological:      Mental Status: He is alert and oriented to person, place, and time. Mental status is at baseline.   Psychiatric:         Mood and Affect: Mood normal.                Significant Labs: All pertinent labs within the past 24 hours have been reviewed.  CBC:   Recent Labs   Lab 06/28/24  1606   WBC 5.21   HGB 13.8*   HCT 40.4        CMP:   Recent Labs   Lab 06/28/24  1606   *   K 3.5      CO2 21*   *   BUN 9   CREATININE 0.7   CALCIUM 7.6*   PROT 5.2*   ALBUMIN 1.6*   BILITOT 4.1*   ALKPHOS 476*   *   *   ANIONGAP 10       Significant Imaging: I have reviewed all pertinent imaging results/findings within the past 24 hours.

## 2024-06-29 NOTE — PLAN OF CARE
06/29/24 1036   MORRISSEY Message   Medicare Outpatient and Observation Notification regarding financial responsibility Given to patient/caregiver;Explained to patient/caregiver;Signed/date by patient/caregiver   Date MORRISSEY was signed 06/29/24   Time MORRISSEY was signed 1036

## 2024-06-29 NOTE — ASSESSMENT & PLAN NOTE
Patient states he was exposed to hep B while working in [place] in the 1990s and is on prophylaxis with lamivudine     -continue lamivudine daily

## 2024-06-30 LAB
ALBUMIN SERPL BCP-MCNC: 1.3 G/DL (ref 3.5–5.2)
ALP SERPL-CCNC: 419 U/L (ref 55–135)
ALT SERPL W/O P-5'-P-CCNC: 89 U/L (ref 10–44)
ANION GAP SERPL CALC-SCNC: 8 MMOL/L (ref 8–16)
AST SERPL-CCNC: 135 U/L (ref 10–40)
BASOPHILS # BLD AUTO: 0.02 K/UL (ref 0–0.2)
BASOPHILS NFR BLD: 0.4 % (ref 0–1.9)
BILIRUB DIRECT SERPL-MCNC: 3 MG/DL (ref 0.1–0.3)
BILIRUB SERPL-MCNC: 3.6 MG/DL (ref 0.1–1)
BUN SERPL-MCNC: 6 MG/DL (ref 8–23)
C DIFF GDH STL QL: NEGATIVE
C DIFF TOX A+B STL QL IA: NEGATIVE
CALCIUM SERPL-MCNC: 7.2 MG/DL (ref 8.7–10.5)
CHLORIDE SERPL-SCNC: 101 MMOL/L (ref 95–110)
CO2 SERPL-SCNC: 22 MMOL/L (ref 23–29)
CREAT SERPL-MCNC: 0.6 MG/DL (ref 0.5–1.4)
DIFFERENTIAL METHOD BLD: ABNORMAL
EOSINOPHIL # BLD AUTO: 0.1 K/UL (ref 0–0.5)
EOSINOPHIL NFR BLD: 2.3 % (ref 0–8)
ERYTHROCYTE [DISTWIDTH] IN BLOOD BY AUTOMATED COUNT: 15.3 % (ref 11.5–14.5)
EST. GFR  (NO RACE VARIABLE): >60 ML/MIN/1.73 M^2
GGT SERPL-CCNC: 519 U/L (ref 8–55)
GLUCOSE SERPL-MCNC: 83 MG/DL (ref 70–110)
HCT VFR BLD AUTO: 35.9 % (ref 40–54)
HGB BLD-MCNC: 12.3 G/DL (ref 14–18)
IMM GRANULOCYTES # BLD AUTO: 0.01 K/UL (ref 0–0.04)
IMM GRANULOCYTES NFR BLD AUTO: 0.2 % (ref 0–0.5)
LYMPHOCYTES # BLD AUTO: 1.1 K/UL (ref 1–4.8)
LYMPHOCYTES NFR BLD: 23.2 % (ref 18–48)
MAGNESIUM SERPL-MCNC: 1.7 MG/DL (ref 1.6–2.6)
MCH RBC QN AUTO: 30.3 PG (ref 27–31)
MCHC RBC AUTO-ENTMCNC: 34.3 G/DL (ref 32–36)
MCV RBC AUTO: 88 FL (ref 82–98)
MONOCYTES # BLD AUTO: 0.4 K/UL (ref 0.3–1)
MONOCYTES NFR BLD: 8.2 % (ref 4–15)
NEUTROPHILS # BLD AUTO: 3.2 K/UL (ref 1.8–7.7)
NEUTROPHILS NFR BLD: 65.7 % (ref 38–73)
NRBC BLD-RTO: 0 /100 WBC
PLATELET # BLD AUTO: 216 K/UL (ref 150–450)
PMV BLD AUTO: 11.3 FL (ref 9.2–12.9)
POCT GLUCOSE: 78 MG/DL (ref 70–110)
POCT GLUCOSE: 78 MG/DL (ref 70–110)
POCT GLUCOSE: 82 MG/DL (ref 70–110)
POCT GLUCOSE: 84 MG/DL (ref 70–110)
POTASSIUM SERPL-SCNC: 3.6 MMOL/L (ref 3.5–5.1)
PROT SERPL-MCNC: 4.4 G/DL (ref 6–8.4)
RBC # BLD AUTO: 4.06 M/UL (ref 4.6–6.2)
SODIUM SERPL-SCNC: 131 MMOL/L (ref 136–145)
WBC # BLD AUTO: 4.87 K/UL (ref 3.9–12.7)

## 2024-06-30 PROCEDURE — 87324 CLOSTRIDIUM AG IA: CPT | Performed by: HOSPITALIST

## 2024-06-30 PROCEDURE — 94761 N-INVAS EAR/PLS OXIMETRY MLT: CPT

## 2024-06-30 PROCEDURE — 25000003 PHARM REV CODE 250: Performed by: HOSPITALIST

## 2024-06-30 PROCEDURE — 82977 ASSAY OF GGT: CPT | Performed by: INTERNAL MEDICINE

## 2024-06-30 PROCEDURE — 63600175 PHARM REV CODE 636 W HCPCS: Mod: JZ,JG | Performed by: NURSE PRACTITIONER

## 2024-06-30 PROCEDURE — 82248 BILIRUBIN DIRECT: CPT | Performed by: INTERNAL MEDICINE

## 2024-06-30 PROCEDURE — 85025 COMPLETE CBC W/AUTO DIFF WBC: CPT | Performed by: NURSE PRACTITIONER

## 2024-06-30 PROCEDURE — 87449 NOS EACH ORGANISM AG IA: CPT | Performed by: HOSPITALIST

## 2024-06-30 PROCEDURE — 21400001 HC TELEMETRY ROOM

## 2024-06-30 PROCEDURE — A4216 STERILE WATER/SALINE, 10 ML: HCPCS | Performed by: HOSPITALIST

## 2024-06-30 PROCEDURE — 83735 ASSAY OF MAGNESIUM: CPT | Performed by: NURSE PRACTITIONER

## 2024-06-30 PROCEDURE — 27000207 HC ISOLATION

## 2024-06-30 PROCEDURE — 25000003 PHARM REV CODE 250: Performed by: NURSE PRACTITIONER

## 2024-06-30 PROCEDURE — 63600175 PHARM REV CODE 636 W HCPCS: Performed by: HOSPITALIST

## 2024-06-30 PROCEDURE — 80053 COMPREHEN METABOLIC PANEL: CPT | Performed by: NURSE PRACTITIONER

## 2024-06-30 PROCEDURE — 36415 COLL VENOUS BLD VENIPUNCTURE: CPT | Performed by: NURSE PRACTITIONER

## 2024-06-30 RX ADMIN — CIPROFLOXACIN 400 MG: 2 INJECTION, SOLUTION INTRAVENOUS at 05:06

## 2024-06-30 RX ADMIN — ONDANSETRON 4 MG: 2 INJECTION INTRAMUSCULAR; INTRAVENOUS at 08:06

## 2024-06-30 RX ADMIN — OXYCODONE HYDROCHLORIDE 5 MG: 5 TABLET ORAL at 09:06

## 2024-06-30 RX ADMIN — OXYCODONE HYDROCHLORIDE 10 MG: 10 TABLET ORAL at 05:06

## 2024-06-30 RX ADMIN — SODIUM CHLORIDE, PRESERVATIVE FREE 10 ML: 5 INJECTION INTRAVENOUS at 12:06

## 2024-06-30 RX ADMIN — LAMIVUDINE 150 MG: 150 TABLET, FILM COATED ORAL at 09:06

## 2024-06-30 RX ADMIN — METRONIDAZOLE 500 MG: 5 INJECTION, SOLUTION INTRAVENOUS at 06:06

## 2024-06-30 RX ADMIN — SODIUM CHLORIDE, PRESERVATIVE FREE 10 ML: 5 INJECTION INTRAVENOUS at 06:06

## 2024-06-30 RX ADMIN — SODIUM CHLORIDE, POTASSIUM CHLORIDE, SODIUM LACTATE AND CALCIUM CHLORIDE: 600; 310; 30; 20 INJECTION, SOLUTION INTRAVENOUS at 06:06

## 2024-06-30 RX ADMIN — METRONIDAZOLE 500 MG: 5 INJECTION, SOLUTION INTRAVENOUS at 09:06

## 2024-06-30 RX ADMIN — SODIUM CHLORIDE, POTASSIUM CHLORIDE, SODIUM LACTATE AND CALCIUM CHLORIDE: 600; 310; 30; 20 INJECTION, SOLUTION INTRAVENOUS at 11:06

## 2024-06-30 RX ADMIN — DIGOXIN 0.12 MG: 125 TABLET ORAL at 08:06

## 2024-06-30 RX ADMIN — METRONIDAZOLE 500 MG: 5 INJECTION, SOLUTION INTRAVENOUS at 02:06

## 2024-06-30 RX ADMIN — OXYCODONE HYDROCHLORIDE 10 MG: 10 TABLET ORAL at 11:06

## 2024-06-30 RX ADMIN — POTASSIUM BICARBONATE 40 MEQ: 391 TABLET, EFFERVESCENT ORAL at 12:06

## 2024-06-30 RX ADMIN — SODIUM CHLORIDE, PRESERVATIVE FREE 10 ML: 5 INJECTION INTRAVENOUS at 05:06

## 2024-06-30 RX ADMIN — ESCITALOPRAM OXALATE 20 MG: 10 TABLET ORAL at 09:06

## 2024-06-30 NOTE — ASSESSMENT & PLAN NOTE
- Recent HgbA1c 7.4 performed on 3/2024.  Patient has not been taking insulin and was on tirzepeptide and stopped taking in April  - Glucose level reviewed, in normal to slightly elevated range  - Continue monitor with Accu-Cheks q.a.c. and q.h.s. and give low-dose sliding scale insulin as necessary

## 2024-06-30 NOTE — SUBJECTIVE & OBJECTIVE
Interval History:  No acute events, patient reports improvement in abdominal pain.  Still with some nausea but no vomiting, passing flatus but no bowel movement.    Review of Systems   Constitutional:  Negative for chills and fever.   Respiratory:  Negative for shortness of breath.    Gastrointestinal:  Positive for abdominal pain and nausea. Negative for vomiting.     Objective:     Vital Signs (Most Recent):  Temp: 98 °F (36.7 °C) (06/29/24 1957)  Pulse: 83 (06/29/24 1957)  Resp: 18 (06/29/24 1957)  BP: 111/63 (06/29/24 1957)  SpO2: (!) 93 % (06/29/24 1957) Vital Signs (24h Range):  Temp:  [97.5 °F (36.4 °C)-98 °F (36.7 °C)] 98 °F (36.7 °C)  Pulse:  [76-92] 83  Resp:  [16-18] 18  SpO2:  [91 %-96 %] 93 %  BP: ()/(58-93) 111/63     Weight: 80.6 kg (177 lb 11.1 oz)  Body mass index is 24.1 kg/m².    Intake/Output Summary (Last 24 hours) at 6/29/2024 2010  Last data filed at 6/29/2024 1715  Gross per 24 hour   Intake 540 ml   Output 450 ml   Net 90 ml         Physical Exam  Vitals and nursing note reviewed.   Constitutional:       Appearance: He is normal weight. He is ill-appearing (chronically).   HENT:      Head: Normocephalic.      Mouth/Throat:      Mouth: Mucous membranes are moist.      Pharynx: Oropharynx is clear.   Eyes:      General: Lids are normal. Gaze aligned appropriately.      Conjunctiva/sclera: Conjunctivae normal.   Cardiovascular:      Rate and Rhythm: Normal rate and regular rhythm.      Pulses: Normal pulses.      Heart sounds: Normal heart sounds.   Pulmonary:      Effort: Pulmonary effort is normal.      Breath sounds: Normal breath sounds.   Abdominal:      General: Bowel sounds are normal.      Palpations: Abdomen is soft.      Tenderness: There is generalized abdominal tenderness. There is no guarding or rebound.      Comments: Very mild tenderness to palpation, diffusely   Musculoskeletal:         General: Normal range of motion.      Cervical back: Normal range of motion.      Right  lower leg: Edema present.      Left lower leg: Edema present.   Skin:     General: Skin is warm and dry.   Neurological:      Mental Status: He is alert and oriented to person, place, and time. Mental status is at baseline.   Psychiatric:         Mood and Affect: Mood normal.             Significant Labs: All pertinent labs within the past 24 hours have been reviewed.    Significant Imaging: I have reviewed all pertinent imaging results/findings within the past 24 hours.

## 2024-06-30 NOTE — ASSESSMENT & PLAN NOTE
Abnormal LFTs  Hypokalemia  - Patient with intermittent nausea, vomiting and diarrhea.  Reports subjective fever.  CT abdomen with findings concerning for colitis.   - Continue empiric Cipro and Flagyl  - Start IV fluid with lactated Ringer's at 75 cc/hr given patient appears to be volume contracted  - Appreciate GI input, plan for further workup with MRCP and patient ordered for direct bilirubin and gamma-GT  - Liver function tests with some improvement and patient with some clinical improvement in symptoms  - Continue trend with repeat laboratory testing and correct electrolytes as needed

## 2024-06-30 NOTE — ASSESSMENT & PLAN NOTE
Abnormal LFTs  Hypokalemia  - Patient with intermittent nausea, vomiting and diarrhea.  Reports subjective fever.  CT abdomen with findings concerning for colitis.   - Continue empiric Cipro and Flagyl  - Start IV fluid with lactated Ringer's at 75 cc/hr given patient appears to be volume contracted  - Appreciate GI input, plan for further workup with MRCP which is still pending  - Liver function tests with some improvement and with cholestatic pattern  - Patient with some clinical improvement in symptoms and stool studies pending  - Continue trend with repeat laboratory testing and correct electrolytes as needed

## 2024-06-30 NOTE — PLAN OF CARE
Problem: Diabetes Comorbidity  Goal: Blood Glucose Level Within Targeted Range  6/30/2024 0752 by Tatiana Beltran, RN  Outcome: Progressing  6/30/2024 0752 by Tatiana Beltran, RN  Outcome: Progressing     Problem: Adult Inpatient Plan of Care  Goal: Plan of Care Review  6/30/2024 0752 by Tatiana Beltran, RN  Outcome: Progressing  6/30/2024 0752 by Tatiana Beltran RN  Outcome: Progressing  Goal: Patient-Specific Goal (Individualized)  6/30/2024 0752 by Tatiana Beltran RN  Outcome: Progressing  6/30/2024 0752 by Tatiana Beltran RN  Outcome: Progressing  Goal: Absence of Hospital-Acquired Illness or Injury  6/30/2024 0752 by Tatiana Beltran, RN  Outcome: Progressing  6/30/2024 0752 by Tatiana Beltran, RN  Outcome: Progressing  Goal: Optimal Comfort and Wellbeing  6/30/2024 0752 by Tatiana Beltran RN  Outcome: Progressing  6/30/2024 0752 by Tatiana Beltran RN  Outcome: Progressing  Goal: Readiness for Transition of Care  6/30/2024 0752 by Tatiana Beltran RN  Outcome: Progressing  6/30/2024 0752 by Tatiana Beltran RN  Outcome: Progressing

## 2024-06-30 NOTE — ASSESSMENT & PLAN NOTE
- History SVT noted.  Followed by Cardiology, Dr. Doshi, outpatient.  States he takes digoxin and has had no recent episodes of SVT  - Continue digoxin 0.125 mg p.o. daily  - Monitor on telemetry

## 2024-06-30 NOTE — ASSESSMENT & PLAN NOTE
- History noted.  LFTs on admission elevated from baseline with , T bili 4.1,  and .  - As discussed above

## 2024-06-30 NOTE — PROGRESS NOTES
[facility]  Progress Note    Patient Name: Nithin Wagner  MRN: [id number]  Patient Class: OP- Observation   Admission Date: 6/28/2024  Length of Stay: 0 days  Attending Physician: Darby Obrien MD  Primary Care Provider: Tushar Drew MD        Subjective:     Principal Problem:Colitis        HPI:  Nithin Wagner is a 68 y.o. male with a past medical history of hepatitis-B, hypertension, diastolic heart failure, diabetes, rheumatoid arthritis, hyponatremia, s/p s/p T11 and L3 kyphoplasty on 4/22/2024 who presents to the ED with lower abdominal pain recurrent onset 4 days ago.  Patient reports associated nausea and vomiting and states he has been experiencing intermittent diarrhea over the past several months.  He denies blood in stool.  Patient  was seen here on i 06/26/2024 for emesis and weakness. Workup showed findings suggestive of colitis and dehydration. He was offered admission at that time but he stated he would prefer to go home. Stated he went home and was unable to hold down significant fluids and came back.  Reports subjective fever a few days ago.  Had a normal bowel movement this morning     ED workup significant for unremarkable CBC, mild hyponatremia 132, T bili 4.1, albumin 1.6,  and .  Lactic acid 1.2.  CT abdomen performed 06/26/2024 showed diffuse colitis.  Patient was started on IV Cipro and Flagyl in the ED and referred to Hospital Medicine.  Patient will be admitted for further evaluation and management.    Overview/Hospital Course:  Mr. Wagner presented with abdominal pain with nausea and vomiting.  Placed in observation status.  Treatment initiated for colitis with IV Cipro and Flagyl, IV fluids, antiemetics and supportive care.  GI consulted.    Interval History:  No acute events, patient reports improvement in abdominal pain.  Still with some nausea but no vomiting, passing flatus but no bowel movement.    Review of Systems   Constitutional:   Negative for chills and fever.   Respiratory:  Negative for shortness of breath.    Gastrointestinal:  Positive for abdominal pain and nausea. Negative for vomiting.     Objective:     Vital Signs (Most Recent):  Temp: 98 °F (36.7 °C) (06/29/24 1957)  Pulse: 83 (06/29/24 1957)  Resp: 18 (06/29/24 1957)  BP: 111/63 (06/29/24 1957)  SpO2: (!) 93 % (06/29/24 1957) Vital Signs (24h Range):  Temp:  [97.5 °F (36.4 °C)-98 °F (36.7 °C)] 98 °F (36.7 °C)  Pulse:  [76-92] 83  Resp:  [16-18] 18  SpO2:  [91 %-96 %] 93 %  BP: ()/(58-93) 111/63     Weight: 80.6 kg (177 lb 11.1 oz)  Body mass index is 24.1 kg/m².    Intake/Output Summary (Last 24 hours) at 6/29/2024 2010  Last data filed at 6/29/2024 1715  Gross per 24 hour   Intake 540 ml   Output 450 ml   Net 90 ml         Physical Exam  Vitals and nursing note reviewed.   Constitutional:       Appearance: He is normal weight. He is ill-appearing (chronically).   HENT:      Head: Normocephalic.      Mouth/Throat:      Mouth: Mucous membranes are moist.      Pharynx: Oropharynx is clear.   Eyes:      General: Lids are normal. Gaze aligned appropriately.      Conjunctiva/sclera: Conjunctivae normal.   Cardiovascular:      Rate and Rhythm: Normal rate and regular rhythm.      Pulses: Normal pulses.      Heart sounds: Normal heart sounds.   Pulmonary:      Effort: Pulmonary effort is normal.      Breath sounds: Normal breath sounds.   Abdominal:      General: Bowel sounds are normal.      Palpations: Abdomen is soft.      Tenderness: There is generalized abdominal tenderness. There is no guarding or rebound.      Comments: Very mild tenderness to palpation, diffusely   Musculoskeletal:         General: Normal range of motion.      Cervical back: Normal range of motion.      Right lower leg: Edema present.      Left lower leg: Edema present.   Skin:     General: Skin is warm and dry.   Neurological:      Mental Status: He is alert and oriented to person, place, and time. Mental  status is at baseline.   Psychiatric:         Mood and Affect: Mood normal.             Significant Labs: All pertinent labs within the past 24 hours have been reviewed.    Significant Imaging: I have reviewed all pertinent imaging results/findings within the past 24 hours.    Assessment/Plan:      * Colitis  Abnormal LFTs  Hypokalemia  - Patient with intermittent nausea, vomiting and diarrhea.  Reports subjective fever.  CT abdomen with findings concerning for colitis.   - Continue empiric Cipro and Flagyl  - Start IV fluid with lactated Ringer's at 75 cc/hr given patient appears to be volume contracted  - Appreciate GI input, plan for further workup with MRCP and patient ordered for direct bilirubin and gamma-GT  - Liver function tests with some improvement and patient with some clinical improvement in symptoms  - Continue trend with repeat laboratory testing and correct electrolytes as needed    SVT (supraventricular tachycardia)  - History SVT noted.  Followed by Cardiology, Dr. Doshi, outpatient.  States he takes digoxin and has had no recent episodes of SVT  - Continue digoxin 0.125 mg p.o. daily  - Monitor on telemetry    Type 2 diabetes mellitus, with long-term current use of insulin  - Recent HgbA1c 7.4 performed on 3/2024.  Patient has not been taking insulin and was on tirzepeptide and stopped taking in April  - Glucose level reviewed, in normal to slightly elevated range  - Continue monitor with Accu-Cheks q.a.c. and q.h.s. and give low-dose sliding scale insulin as necessary    Chronic diastolic heart failure  - Recent Echo performed 04/03/2024 showed EF 55-60%.  Patient states he no longer takes Lasix.  Lower extremity edema noted, patient denies shortness of breath  - Clinically appears dry, and without volume overload  - Judicious IV fluids as discussed above    NAFLD (nonalcoholic fatty liver disease)  - History noted.  LFTs on admission elevated from baseline with , T bili 4.1,  and  .  - As discussed above    Hepatitis B core antibody positive  - Patient states he was exposed to hep B while working in [place] in the 1990s and is on prophylaxis with lamivudine   - Continue lamivudine daily      VTE Risk Mitigation (From admission, onward)           Ordered     IP VTE HIGH RISK PATIENT  Once         06/28/24 2107     Place sequential compression device  Until discontinued         06/28/24 2107                        Darby Obrien MD  Department of Hospital Medicine   [facility] ([place])

## 2024-06-30 NOTE — HOSPITAL COURSE
Mr. Wagner presented with abdominal pain with nausea and vomiting.  Treatment initiated for colitis with IV Cipro and Flagyl, IV fluids, antiemetics and supportive care.  GI consulted. Underwent paracentesis. LFTs gradually improved though appetite remained poor. Completed full course of antibiotic therapy. Autoimmune workup for liver disease unremarkable. Scheduled antiemetics with improvement in symptoms though not in appetite. Started PPN. EGD performed 07/08 demonstrating white plaques in esophagus and gastritis; started fluconazole. Review of home medication showed he was taking tirzepatide which was not restarted during hospitalization and discontinued altogether given it likely contributed to his clinical symptoms.  Patient was debilitated, seen by PT and OT, and arranged for home health.    Colitis  Abnormal LFTs  Hypokalemia and debility  - Patient with intermittent nausea, vomiting and diarrhea.  Reported subjective fever.  CT abdomen with findings concerning for colitis.   - Completed 7 days of ciprofloxacin/metronidazole.  - Liver function tests with some improvement, cholestatic pattern initially.  - Patient with some clinical improvement in symptoms and stool studies negative for C.diff  - MRCP was negative for obstruction, noted gallbladder distention and large amount of ascites  - Case discussed with GI; paracentesis 07/02 demonstrating SAAG 1.1. NITIN, anti-mitochondrial, ASMA negative.  - Replete lytes as required. Monitored PO intake. Continued ondansetron, metoclopramide, continued PPN.  - EGD remarkable for Candida esophagitis and patient was started on fluconazole on 7/8  - Clinically was improving with decreased pain and increased p.o. intake, weaned off PPN   - Appreciate GI assistance  - Intake was improving, monitored off PPN   - Review of home medications showed he was taking tirzepatide which was not restarted during hospitalization and discontinued altogether given it likely contributed to  presentation; educated the patient that this will be not restarted on discharge  - Arranged for home health with follow up PCP and GI     SVT (supraventricular tachycardia)  - History SVT noted.  Followed by Cardiology, Dr. Doshi, outpatient.  States he takes digoxin and has had no recent episodes of SVT  - Continued digoxin 0.125 mg p.o. daily  - Monitored on telemetry     Type 2 diabetes mellitus, with long-term current use of insulin  - Recent HgbA1c 7.4 performed on 3/2024.  Patient has not been taking insulin and was on tirzepeptide and stopped taking in April  - Glucose level reviewed, was in normal to slightly elevated range  - Continued with Accu-Cheks q.a.c. and q.h.s. and low-dose sliding scale insulin as necessary     Chronic diastolic heart failure  - Recent Echo performed 04/03/2024 showed EF 55-60%.  Patient stated he no longer takes Lasix.  Lower extremity edema noted, patient denied shortness of breath  - Slight volume overload in a setting with anasarca due to liver disease / hypoalbuminemia.  - Judicious IV Lasix 40 mg x 1 and with improvement   - Consulted cardiology, patient known to Dr. Dempsey, appreciated input     NAFLD (nonalcoholic fatty liver disease)  - History noted.  LFTs on admission elevated from baseline with , T bili 4.1,  and .  - As discussed above     Hepatitis B core antibody positive  - Patient stated he was exposed to hep B while working in [place] in the 1990s and is on prophylaxis with lamivudine   - Continued lamivudine daily

## 2024-06-30 NOTE — PROGRESS NOTES
[facility]  Progress Note    Patient Name: Nithin Wagner  MRN: [id number]  Patient Class: IP- Inpatient   Admission Date: 6/28/2024  Length of Stay: 1 days  Attending Physician: Darby Obrien MD  Primary Care Provider: Tushar Drew MD        Subjective:     Principal Problem:Colitis        HPI:  Nithin Wagner is a 68 y.o. male with a past medical history of hepatitis-B, hypertension, diastolic heart failure, diabetes, rheumatoid arthritis, hyponatremia, s/p s/p T11 and L3 kyphoplasty on 4/22/2024 who presents to the ED with lower abdominal pain recurrent onset 4 days ago.  Patient reports associated nausea and vomiting and states he has been experiencing intermittent diarrhea over the past several months.  He denies blood in stool.  Patient  was seen here on i 06/26/2024 for emesis and weakness. Workup showed findings suggestive of colitis and dehydration. He was offered admission at that time but he stated he would prefer to go home. Stated he went home and was unable to hold down significant fluids and came back.  Reports subjective fever a few days ago.  Had a normal bowel movement this morning     ED workup significant for unremarkable CBC, mild hyponatremia 132, T bili 4.1, albumin 1.6,  and .  Lactic acid 1.2.  CT abdomen performed 06/26/2024 showed diffuse colitis.  Patient was started on IV Cipro and Flagyl in the ED and referred to Hospital Medicine.  Patient will be admitted for further evaluation and management.    Overview/Hospital Course:  Mr. Wagner presented with abdominal pain with nausea and vomiting.  Placed in observation status.  Treatment initiated for colitis with IV Cipro and Flagyl, IV fluids, antiemetics and supportive care.  GI consulted.    Interval History:  No acute events, patient reports improvement in abdominal pain and stools with improvement with odor not as foul; still with some nausea but no further vomiting.    Review of Systems    Constitutional:  Negative for chills and fever.   Respiratory:  Negative for shortness of breath.    Gastrointestinal:  Positive for abdominal pain and nausea. Negative for vomiting.     Objective:     Vital Signs (Most Recent):  Temp: 97.9 °F (36.6 °C) (06/30/24 1213)  Pulse: 91 (06/30/24 1459)  Resp: 16 (06/30/24 1213)  BP: 115/67 (06/30/24 1213)  SpO2: (!) 93 % (06/30/24 1213) Vital Signs (24h Range):  Temp:  [97.6 °F (36.4 °C)-98 °F (36.7 °C)] 97.9 °F (36.6 °C)  Pulse:  [77-93] 91  Resp:  [16-20] 16  SpO2:  [92 %-95 %] 93 %  BP: (102-119)/(63-80) 115/67     Weight: 80.6 kg (177 lb 11.1 oz)  Body mass index is 24.1 kg/m².    Intake/Output Summary (Last 24 hours) at 6/30/2024 1517  Last data filed at 6/30/2024 1252  Gross per 24 hour   Intake 3120.12 ml   Output --   Net 3120.12 ml         Physical Exam  Vitals and nursing note reviewed.   Constitutional:       Appearance: He is normal weight. He is ill-appearing (chronically).   HENT:      Head: Normocephalic.      Mouth/Throat:      Mouth: Mucous membranes are moist.      Pharynx: Oropharynx is clear.   Eyes:      General: Lids are normal. Gaze aligned appropriately.      Conjunctiva/sclera: Conjunctivae normal.   Cardiovascular:      Rate and Rhythm: Normal rate and regular rhythm.      Pulses: Normal pulses.      Heart sounds: Normal heart sounds.   Pulmonary:      Effort: Pulmonary effort is normal.      Breath sounds: Normal breath sounds.   Abdominal:      General: Bowel sounds are normal.      Palpations: Abdomen is soft.      Tenderness: There is generalized abdominal tenderness. There is no guarding or rebound.      Comments: Very mild tenderness to palpation, diffusely   Musculoskeletal:         General: Normal range of motion.      Cervical back: Normal range of motion.      Right lower leg: Edema present.      Left lower leg: Edema present.   Skin:     General: Skin is warm and dry.   Neurological:      Mental Status: He is alert and oriented to  person, place, and time. Mental status is at baseline.   Psychiatric:         Mood and Affect: Mood normal.             Significant Labs: All pertinent labs within the past 24 hours have been reviewed.    Significant Imaging: I have reviewed all pertinent imaging results/findings within the past 24 hours.    Assessment/Plan:      * Colitis  Abnormal LFTs  Hypokalemia  - Patient with intermittent nausea, vomiting and diarrhea.  Reports subjective fever.  CT abdomen with findings concerning for colitis.   - Continue empiric Cipro and Flagyl  - Start IV fluid with lactated Ringer's at 75 cc/hr given patient appears to be volume contracted  - Appreciate GI input, plan for further workup with MRCP which is still pending  - Liver function tests with some improvement and with cholestatic pattern  - Patient with some clinical improvement in symptoms and stool studies pending  - Continue trend with repeat laboratory testing and correct electrolytes as needed    SVT (supraventricular tachycardia)  - History SVT noted.  Followed by Cardiology, Dr. Doshi, outpatient.  States he takes digoxin and has had no recent episodes of SVT  - Continue digoxin 0.125 mg p.o. daily  - Monitor on telemetry    Type 2 diabetes mellitus, with long-term current use of insulin  - Recent HgbA1c 7.4 performed on 3/2024.  Patient has not been taking insulin and was on tirzepeptide and stopped taking in April  - Glucose level reviewed, in normal to slightly elevated range  - Continue monitor with Accu-Cheks q.a.c. and q.h.s. and give low-dose sliding scale insulin as necessary    Chronic diastolic heart failure  - Recent Echo performed 04/03/2024 showed EF 55-60%.  Patient states he no longer takes Lasix.  Lower extremity edema noted, patient denies shortness of breath  - Clinically appears dry, and without volume overload  - Judicious IV fluids as discussed above    NAFLD (nonalcoholic fatty liver disease)  - History noted.  LFTs on admission  elevated from baseline with , T bili 4.1,  and .  - As discussed above    Hepatitis B core antibody positive  - Patient states he was exposed to hep B while working in [place] in the 1990s and is on prophylaxis with lamivudine   - Continue lamivudine daily      VTE Risk Mitigation (From admission, onward)           Ordered     IP VTE HIGH RISK PATIENT  Once         06/28/24 2107     Place sequential compression device  Until discontinued         06/28/24 2107                      Darby Obrien MD  Department of Hospital Medicine   [facility])

## 2024-06-30 NOTE — SUBJECTIVE & OBJECTIVE
Interval History:  No acute events, patient reports improvement in abdominal pain and stools with improvement with odor not as foul; still with some nausea but no further vomiting.    Review of Systems   Constitutional:  Negative for chills and fever.   Respiratory:  Negative for shortness of breath.    Gastrointestinal:  Positive for abdominal pain and nausea. Negative for vomiting.     Objective:     Vital Signs (Most Recent):  Temp: 97.9 °F (36.6 °C) (06/30/24 1213)  Pulse: 91 (06/30/24 1459)  Resp: 16 (06/30/24 1213)  BP: 115/67 (06/30/24 1213)  SpO2: (!) 93 % (06/30/24 1213) Vital Signs (24h Range):  Temp:  [97.6 °F (36.4 °C)-98 °F (36.7 °C)] 97.9 °F (36.6 °C)  Pulse:  [77-93] 91  Resp:  [16-20] 16  SpO2:  [92 %-95 %] 93 %  BP: (102-119)/(63-80) 115/67     Weight: 80.6 kg (177 lb 11.1 oz)  Body mass index is 24.1 kg/m².    Intake/Output Summary (Last 24 hours) at 6/30/2024 1517  Last data filed at 6/30/2024 1252  Gross per 24 hour   Intake 3120.12 ml   Output --   Net 3120.12 ml         Physical Exam  Vitals and nursing note reviewed.   Constitutional:       Appearance: He is normal weight. He is ill-appearing (chronically).   HENT:      Head: Normocephalic.      Mouth/Throat:      Mouth: Mucous membranes are moist.      Pharynx: Oropharynx is clear.   Eyes:      General: Lids are normal. Gaze aligned appropriately.      Conjunctiva/sclera: Conjunctivae normal.   Cardiovascular:      Rate and Rhythm: Normal rate and regular rhythm.      Pulses: Normal pulses.      Heart sounds: Normal heart sounds.   Pulmonary:      Effort: Pulmonary effort is normal.      Breath sounds: Normal breath sounds.   Abdominal:      General: Bowel sounds are normal.      Palpations: Abdomen is soft.      Tenderness: There is generalized abdominal tenderness. There is no guarding or rebound.      Comments: Very mild tenderness to palpation, diffusely   Musculoskeletal:         General: Normal range of motion.      Cervical back:  Normal range of motion.      Right lower leg: Edema present.      Left lower leg: Edema present.   Skin:     General: Skin is warm and dry.   Neurological:      Mental Status: He is alert and oriented to person, place, and time. Mental status is at baseline.   Psychiatric:         Mood and Affect: Mood normal.             Significant Labs: All pertinent labs within the past 24 hours have been reviewed.    Significant Imaging: I have reviewed all pertinent imaging results/findings within the past 24 hours.

## 2024-06-30 NOTE — ASSESSMENT & PLAN NOTE
- Patient states he was exposed to hep B while working in [place] in the 1990s and is on prophylaxis with lamivudine   - Continue lamivudine daily

## 2024-06-30 NOTE — PROGRESS NOTES
Gastroenterology Progress Note    Reason for Consult: N/V, abnormal LFTs, abdominal pain, diarrhea    Subjective:  Had some diarrhea - less putrid smelling than before according to pt.  Stool collected for C. Diff testing. LFTs remain elevated.  Abdominal pain still present but slightly improved.  Appetite remains poor.    ROS:  Gen: no F/C  CV: no CP/palpitations  Resp: no SOB/wheezing    Physical Exam  /66 (BP Location: Right arm, Patient Position: Lying)   Pulse 93   Temp 97.9 °F (36.6 °C) (Oral)   Resp 18   Ht 6' (1.829 m)   Wt 80.6 kg (177 lb 11.1 oz)   SpO2 (!) 93%   BMI 24.10 kg/m²   General: Awake, alert male in no apparent distress  HEENT: NC/AT, PERRL, EOMI, MMM  CV: RRR, without murmur, gallop, or rubs  Pulm: CTAB, no wheezing, rales, or rhonchi  GI: soft, TTP in RUQ and epigastric region, non-distended, +BS  MSK: no edema and normal ROM  Neuro: A&Ox3, moves all extremities equally, sensation grossly intact  Skin: no rashes or lesions  Psych: normal mood and affect    Medications/Infusions:  Current Facility-Administered Medications   Medication Dose Route Frequency Provider Last Rate Last Admin    acetaminophen tablet 650 mg  650 mg Oral Q8H PRN Valeria Patel NP        ciprofloxacin (CIPRO)400mg/200ml D5W IVPB 400 mg  400 mg Intravenous Q12H Valeria Patel NP   Paused at 06/30/24 0519    dextrose 10% bolus 125 mL 125 mL  12.5 g Intravenous PRN Valeria Patel NP        dextrose 10% bolus 250 mL 250 mL  25 g Intravenous PRN Valeria Patel NP        digoxin tablet 0.125 mg  0.125 mg Oral Daily Valeria Patel NP   0.125 mg at 06/30/24 0832    EScitalopram oxalate tablet 20 mg  20 mg Oral QHS Valeria Patel, NP   20 mg at 06/29/24 2156    glucagon (human recombinant) injection 1 mg  1 mg Intramuscular PRN Valeria Patel, NP        glucose chewable tablet 16 g  16 g Oral PRN Patel, Valeria L., NP        glucose chewable tablet 24 g  24 g Oral PRN Valeria Patel  XIMENA, NP        insulin aspart U-100 pen 0-5 Units  0-5 Units Subcutaneous QID (AC + HS) PRN Valeria Patel, NP        lactated ringers infusion   Intravenous Continuous Darby Obrien MD 75 mL/hr at 06/30/24 1115 Rate Verify at 06/30/24 1115    lamiVUDine tablet 150 mg  150 mg Oral QHS Valeria Patel, NP   150 mg at 06/29/24 2158    LIDOcaine 5 % patch 1 patch  1 patch Transdermal Q24H Valeria Patel, NP   1 patch at 06/28/24 2203    melatonin tablet 6 mg  6 mg Oral Nightly PRN Valeria Patel, NP   6 mg at 06/29/24 2339    metronidazole IVPB 500 mg  500 mg Intravenous Q8H Valeria Patel, NP   Stopped at 06/30/24 0730    naloxone 0.4 mg/mL injection 0.02 mg  0.02 mg Intravenous PRN Valeria Patel, NP        ondansetron injection 4 mg  4 mg Intravenous Q6H PRN Valeria Patel, NP   4 mg at 06/30/24 0832    oxyCODONE immediate release tablet 5 mg  5 mg Oral Q6H PRN Valeria Patel, NP   5 mg at 06/29/24 2156    oxyCODONE immediate release tablet Tab 10 mg  10 mg Oral Q6H PRN Valeria Patel, NP   10 mg at 06/30/24 1106    potassium bicarbonate disintegrating tablet 40 mEq  40 mEq Oral Once Darby Obrien MD        prochlorperazine injection Soln 5 mg  5 mg Intravenous Q6H PRN Valeria Patel, NP        sodium chloride 0.9% flush 10 mL  10 mL Intravenous Q8H PRN Valeria Patel, BASIA        sodium chloride 0.9% flush 10 mL  10 mL Intravenous Q6H Darby Obrien MD   10 mL at 06/30/24 0630    And    sodium chloride 0.9% flush 10 mL  10 mL Intravenous PRN Darby Obrien MD           Intake and Output:    Intake/Output Summary (Last 24 hours) at 6/30/2024 1116  Last data filed at 6/30/2024 1115  Gross per 24 hour   Intake 2758.87 ml   Output --   Net 2758.87 ml       Labs:  Lab Results   Component Value Date/Time    WBC 4.87 06/30/2024 04:31 AM    HGB 12.3 (L) 06/30/2024 04:31 AM    HCT 35.9 (L) 06/30/2024 04:31 AM    HCT 52 04/26/2024 05:18 PM     06/30/2024 04:31 AM    MCV 88  06/30/2024 04:31 AM     (L) 06/30/2024 04:31 AM    K 3.6 06/30/2024 04:31 AM     06/30/2024 04:31 AM    CO2 22 (L) 06/30/2024 04:31 AM    CO2 11 (H) 11/04/2021 04:34 PM    BUN 6 (L) 06/30/2024 04:31 AM    CREATININE 0.6 06/30/2024 04:31 AM    GLU 83 06/30/2024 04:31 AM    CALCIUM 7.2 (L) 06/30/2024 04:31 AM    MG 1.6 06/29/2024 04:43 AM    PHOS 2.8 06/26/2024 11:02 PM    INR 1.4 (H) 06/28/2024 10:31 PM    APTT 28.8 06/28/2024 10:31 PM   ]  Lab Results   Component Value Date/Time    PROT 4.4 (L) 06/30/2024 04:31 AM    ALBUMIN 1.3 (L) 06/30/2024 04:31 AM    ALBUMIN 4.1 06/21/2022 07:23 AM    BILITOT 3.6 (H) 06/30/2024 04:31 AM    BILIDIR 3.0 (H) 06/30/2024 04:31 AM     (H) 06/30/2024 04:31 AM    ALT 89 (H) 06/30/2024 04:31 AM    ALKPHOS 419 (H) 06/30/2024 04:31 AM   ]    Imaging and Procedures:  Labs and imaging results were reviewed.  RUQ U/S 6/28/24:  Distended gallbladder. No gallstones or ultrasonographic evidence to suggest acute cholecystitis.     CT A/P 6/27/24:  Diffuse colitis, of numerous possible etiologies.  Correlate clinically.  Gastroduodenitis, likely with some proximal jejunal enteritis.  Small quantity of intraperitoneal free fluid.  No pneumoperitoneum.  Bilateral nonobstructing intrarenal calculi.  Profound diffuse hepatic steatosis.  Correlate clinically.  Consider outpatient referral to a hepatology/gastroenterology.  Bibasilar pulmonary opacities similar to 06/02/2024, possibly representing subsegmental atelectasis and scarring.  Infectious, inflammatory, or neoplastic process is not excluded.  Correlate clinically, and with follow-up outpatient chest CT within 3-6 months.    Assessment:  Nithin Wagner is a 68 y.o. male with a history of HTN, DM2, HLD, Hep B - cleared (Surface Ag neg), RA, NAFLD, prior CVA, CAD s/p PCI and diastolic CHF w/ atrial myxoma who presented with weakness, N/V, diarrhea and dehydration.  GI consulted for abnormal LFTs.       Plan:  - daily LFTs -  cholestatic pattern  - await MRCP  - avoid hepatotoxic meds  - follow up stool studies - treat for C. Diff if positive    Janet Win MD

## 2024-06-30 NOTE — PLAN OF CARE
VSS on RA and afebrile this shift.  Tolerating pain on PO. Patient on enhanced contact isolation for c diff precaution.   Free from injury or skin breakdown; Fall precautions maintained and call light in reach.  POC updated questions answered and comments acknowledged.      Problem: Adult Inpatient Plan of Care  Goal: Plan of Care Review  Outcome: Progressing  Goal: Patient-Specific Goal (Individualized)  Outcome: Progressing  Goal: Absence of Hospital-Acquired Illness or Injury  Outcome: Progressing  Goal: Optimal Comfort and Wellbeing  Outcome: Progressing  Goal: Readiness for Transition of Care  Outcome: Progressing     Problem: Diabetes Comorbidity  Goal: Blood Glucose Level Within Targeted Range  Outcome: Progressing     Problem: Wound  Goal: Optimal Coping  Outcome: Progressing  Goal: Optimal Functional Ability  Outcome: Progressing  Goal: Absence of Infection Signs and Symptoms  Outcome: Progressing  Goal: Improved Oral Intake  Outcome: Progressing  Goal: Optimal Pain Control and Function  Outcome: Progressing  Goal: Skin Health and Integrity  Outcome: Progressing  Goal: Optimal Wound Healing  Outcome: Progressing     Problem: Infection  Goal: Absence of Infection Signs and Symptoms  Outcome: Progressing

## 2024-06-30 NOTE — ASSESSMENT & PLAN NOTE
- Recent Echo performed 04/03/2024 showed EF 55-60%.  Patient states he no longer takes Lasix.  Lower extremity edema noted, patient denies shortness of breath  - Clinically appears dry, and without volume overload  - Judicious IV fluids as discussed above

## 2024-07-01 ENCOUNTER — TELEPHONE (OUTPATIENT)
Dept: HEPATOLOGY | Facility: CLINIC | Age: 69
End: 2024-07-01
Payer: MEDICARE

## 2024-07-01 LAB
ALBUMIN SERPL BCP-MCNC: 1.5 G/DL (ref 3.5–5.2)
ALP SERPL-CCNC: 472 U/L (ref 55–135)
ALT SERPL W/O P-5'-P-CCNC: 98 U/L (ref 10–44)
ANION GAP SERPL CALC-SCNC: 6 MMOL/L (ref 8–16)
AST SERPL-CCNC: 149 U/L (ref 10–40)
BACTERIA BLD CULT: ABNORMAL
BASOPHILS # BLD AUTO: 0.05 K/UL (ref 0–0.2)
BASOPHILS NFR BLD: 0.9 % (ref 0–1.9)
BILIRUB SERPL-MCNC: 3.8 MG/DL (ref 0.1–1)
BUN SERPL-MCNC: 6 MG/DL (ref 8–23)
CALCIUM SERPL-MCNC: 7.5 MG/DL (ref 8.7–10.5)
CHLORIDE SERPL-SCNC: 101 MMOL/L (ref 95–110)
CO2 SERPL-SCNC: 24 MMOL/L (ref 23–29)
CREAT SERPL-MCNC: 0.7 MG/DL (ref 0.5–1.4)
DIFFERENTIAL METHOD BLD: ABNORMAL
EOSINOPHIL # BLD AUTO: 0.1 K/UL (ref 0–0.5)
EOSINOPHIL NFR BLD: 1.8 % (ref 0–8)
ERYTHROCYTE [DISTWIDTH] IN BLOOD BY AUTOMATED COUNT: 15.8 % (ref 11.5–14.5)
EST. GFR  (NO RACE VARIABLE): >60 ML/MIN/1.73 M^2
GLUCOSE SERPL-MCNC: 89 MG/DL (ref 70–110)
HCT VFR BLD AUTO: 38.4 % (ref 40–54)
HGB BLD-MCNC: 12.9 G/DL (ref 14–18)
IMM GRANULOCYTES # BLD AUTO: 0.03 K/UL (ref 0–0.04)
IMM GRANULOCYTES NFR BLD AUTO: 0.5 % (ref 0–0.5)
LYMPHOCYTES # BLD AUTO: 1.5 K/UL (ref 1–4.8)
LYMPHOCYTES NFR BLD: 25.9 % (ref 18–48)
MCH RBC QN AUTO: 29.8 PG (ref 27–31)
MCHC RBC AUTO-ENTMCNC: 33.6 G/DL (ref 32–36)
MCV RBC AUTO: 89 FL (ref 82–98)
MONOCYTES # BLD AUTO: 0.5 K/UL (ref 0.3–1)
MONOCYTES NFR BLD: 8.2 % (ref 4–15)
NEUTROPHILS # BLD AUTO: 3.5 K/UL (ref 1.8–7.7)
NEUTROPHILS NFR BLD: 62.7 % (ref 38–73)
NRBC BLD-RTO: 0 /100 WBC
OHS QRS DURATION: 98 MS
OHS QTC CALCULATION: 452 MS
PLATELET # BLD AUTO: 231 K/UL (ref 150–450)
PMV BLD AUTO: 11.2 FL (ref 9.2–12.9)
POCT GLUCOSE: 104 MG/DL (ref 70–110)
POCT GLUCOSE: 105 MG/DL (ref 70–110)
POCT GLUCOSE: 80 MG/DL (ref 70–110)
POCT GLUCOSE: 85 MG/DL (ref 70–110)
POTASSIUM SERPL-SCNC: 4.1 MMOL/L (ref 3.5–5.1)
PROT SERPL-MCNC: 4.9 G/DL (ref 6–8.4)
RBC # BLD AUTO: 4.33 M/UL (ref 4.6–6.2)
SODIUM SERPL-SCNC: 131 MMOL/L (ref 136–145)
WBC # BLD AUTO: 5.59 K/UL (ref 3.9–12.7)

## 2024-07-01 PROCEDURE — 21400001 HC TELEMETRY ROOM

## 2024-07-01 PROCEDURE — 25000003 PHARM REV CODE 250: Performed by: NURSE PRACTITIONER

## 2024-07-01 PROCEDURE — 63600175 PHARM REV CODE 636 W HCPCS: Performed by: NURSE PRACTITIONER

## 2024-07-01 PROCEDURE — 25000003 PHARM REV CODE 250: Performed by: HOSPITALIST

## 2024-07-01 PROCEDURE — 85025 COMPLETE CBC W/AUTO DIFF WBC: CPT | Performed by: NURSE PRACTITIONER

## 2024-07-01 PROCEDURE — A4216 STERILE WATER/SALINE, 10 ML: HCPCS | Performed by: HOSPITALIST

## 2024-07-01 PROCEDURE — 36415 COLL VENOUS BLD VENIPUNCTURE: CPT | Performed by: NURSE PRACTITIONER

## 2024-07-01 PROCEDURE — 94761 N-INVAS EAR/PLS OXIMETRY MLT: CPT

## 2024-07-01 PROCEDURE — 80053 COMPREHEN METABOLIC PANEL: CPT | Performed by: NURSE PRACTITIONER

## 2024-07-01 RX ORDER — OXYCODONE HYDROCHLORIDE 10 MG/1
10 TABLET ORAL EVERY 4 HOURS PRN
Status: DISCONTINUED | OUTPATIENT
Start: 2024-07-01 | End: 2024-07-03

## 2024-07-01 RX ORDER — OXYCODONE HYDROCHLORIDE 10 MG/1
20 TABLET ORAL EVERY 4 HOURS PRN
Status: DISCONTINUED | OUTPATIENT
Start: 2024-07-01 | End: 2024-07-03

## 2024-07-01 RX ADMIN — DIGOXIN 0.12 MG: 125 TABLET ORAL at 10:07

## 2024-07-01 RX ADMIN — OXYCODONE HYDROCHLORIDE 10 MG: 10 TABLET ORAL at 12:07

## 2024-07-01 RX ADMIN — CIPROFLOXACIN 400 MG: 2 INJECTION, SOLUTION INTRAVENOUS at 06:07

## 2024-07-01 RX ADMIN — METRONIDAZOLE 500 MG: 5 INJECTION, SOLUTION INTRAVENOUS at 03:07

## 2024-07-01 RX ADMIN — METRONIDAZOLE 500 MG: 5 INJECTION, SOLUTION INTRAVENOUS at 06:07

## 2024-07-01 RX ADMIN — SODIUM CHLORIDE, PRESERVATIVE FREE 10 ML: 5 INJECTION INTRAVENOUS at 06:07

## 2024-07-01 RX ADMIN — OXYCODONE HYDROCHLORIDE 20 MG: 10 TABLET ORAL at 10:07

## 2024-07-01 RX ADMIN — OXYCODONE HYDROCHLORIDE 10 MG: 10 TABLET ORAL at 05:07

## 2024-07-01 RX ADMIN — ONDANSETRON 4 MG: 2 INJECTION INTRAMUSCULAR; INTRAVENOUS at 02:07

## 2024-07-01 RX ADMIN — CIPROFLOXACIN 400 MG: 2 INJECTION, SOLUTION INTRAVENOUS at 05:07

## 2024-07-01 RX ADMIN — SODIUM CHLORIDE, PRESERVATIVE FREE 10 ML: 5 INJECTION INTRAVENOUS at 12:07

## 2024-07-01 RX ADMIN — OXYCODONE HYDROCHLORIDE 20 MG: 10 TABLET ORAL at 09:07

## 2024-07-01 RX ADMIN — METRONIDAZOLE 500 MG: 5 INJECTION, SOLUTION INTRAVENOUS at 10:07

## 2024-07-01 RX ADMIN — ESCITALOPRAM OXALATE 20 MG: 10 TABLET ORAL at 08:07

## 2024-07-01 RX ADMIN — OXYCODONE HYDROCHLORIDE 10 MG: 10 TABLET ORAL at 06:07

## 2024-07-01 RX ADMIN — LAMIVUDINE 150 MG: 150 TABLET, FILM COATED ORAL at 08:07

## 2024-07-01 NOTE — CONSULTS
[facility] - Med Surg ([place])  Wound Care    Patient Name:  Nithin Wagner   MRN:  [id number]  Date: 7/1/2024  Diagnosis: Colitis    History:     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    forehead    Stroke     TIA       Social History     Socioeconomic History    Marital status: Single   Occupational History    Occupation: , respiratory therapist, founded [employer]     Comment: Retired   Tobacco Use    Smoking status: Every Day     Current packs/day: 1.00     Average packs/day: 1 pack/day for 35.0 years (35.0 ttl pk-yrs)     Types: Cigarettes    Smokeless tobacco: Never   Substance and Sexual Activity    Alcohol use: Not Currently    Drug use: No    Sexual activity: Never     Social Determinants of Health     Financial Resource Strain: Low Risk  (6/29/2024)    Overall Financial Resource Strain (CARDIA)     Difficulty of Paying Living Expenses: Not very hard   Food Insecurity: No Food Insecurity (6/29/2024)    Hunger Vital Sign     Worried About Running Out of Food in the Last Year: Never true     Ran Out of Food in the Last Year: Never true   Transportation Needs: No Transportation Needs (6/29/2024)    TRANSPORTATION NEEDS     Transportation : No   Physical Activity: Inactive (6/29/2024)    Exercise Vital Sign     Days of Exercise per Week: 0 days     Minutes of Exercise per Session: 0 min   Stress: No Stress Concern Present (6/29/2024)    Citizen of [place] of Occupational Health - Occupational Stress Questionnaire     Feeling of Stress : Only a little   Recent Concern: Stress - Stress Concern Present  (4/30/2024)    [place] of Occupational Health - Occupational Stress Questionnaire     Feeling of Stress : To some extent   Housing Stability: Low Risk  (6/29/2024)    Housing Stability Vital Sign     Unable to Pay for Housing in the Last Year: No     Homeless in the Last Year: No       Precautions:     Allergies as of 06/28/2024 - Reviewed 06/28/2024   Allergen Reaction Noted    Enbrel [etanercept] Shortness Of Breath 07/12/2021    Nsaids (non-steroidal anti-inflammatory drug) Other (See Comments) 06/29/2015    Statins-hmg-coa reductase inhibitors Other (See Comments) 06/29/2015    Pcn [penicillins] Rash 06/29/2015       WO Assessment Details/Treatment     Wound care consulted for skin tear to left forearm. Patient is a 68 y.o. male with a past medical history of hepatitis-B, hypertension, diastolic heart failure, diabetes, rheumatoid arthritis, hyponatremia, s/p s/p T11 and L3 kyphoplasty on 4/22/2024 who presents to the ED with lower abdominal pain recurrent onset 4 days ago.      Upon assessment to left arm noted small skin tear with skin flap intact. Cleansed with Vashe and applied mepilex silicone foam dressing. Recommend every 5 day dressing change. Nursing and MD team notified. Orders placed. Will follow.        07/01/24 1420        Wound 06/28/24 2145 Skin Tear Left lower;dorsal Arm   Date First Assessed/Time First Assessed: 06/28/24 2145   Present on Original Admission: Yes  Primary Wound Type: Skin Tear  Side: Left  Orientation: lower;dorsal  Location: Arm   Wound Image    Dressing Appearance Dry;Intact;Clean   Drainage Amount Scant   Drainage Characteristics/Odor Serous;No odor   Appearance Red;Moist   Tissue loss description Partial thickness   Periwound Area Intact;Dry   Care Cleansed with:;Antimicrobial agent   Dressing Applied;Silicone;Foam   Dressing Change Due 07/05/24 07/01/2024

## 2024-07-01 NOTE — PROGRESS NOTES
Gastroenterology Progress Note    Reason for Consult: N/V, abnormal LFTs, abdominal pain, diarrhea    Subjective:  C. Diff negative.  LFTs rising slightly.  Awaiting MRCP.  Limited PO intake.  Now gagging even when he tries to drink water.  Pain is worse today.  He is concerned about his lack of PO intake and nutritional status.    ROS:  Gen: no F/C  CV: no CP/palpitations  Resp: no SOB/wheezing    Physical Exam  /88 (BP Location: Right arm, Patient Position: Lying)   Pulse 86   Temp 97.9 °F (36.6 °C) (Oral)   Resp 20   Ht 6' (1.829 m)   Wt 81.5 kg (179 lb 10.8 oz)   SpO2 (!) 94%   BMI 24.37 kg/m²   General: Awake, alert male in no apparent distress  HEENT: NC/AT, PERRL, EOMI, MMM  CV: RRR, without murmur, gallop, or rubs  Pulm: CTAB, no wheezing, rales, or rhonchi  GI: soft, TTP in RUQ and epigastric region, non-distended, +BS  MSK: no edema and normal ROM  Neuro: A&Ox3, moves all extremities equally, sensation grossly intact  Skin: no rashes or lesions  Psych: normal mood and affect    Medications/Infusions:  Current Facility-Administered Medications   Medication Dose Route Frequency Provider Last Rate Last Admin    acetaminophen tablet 650 mg  650 mg Oral Q8H PRN Valeria Patel NP        ciprofloxacin (CIPRO)400mg/200ml D5W IVPB 400 mg  400 mg Intravenous Q12H Valeria Patel NP   Stopped at 07/01/24 0636    dextrose 10% bolus 125 mL 125 mL  12.5 g Intravenous PRN Valeria Patel NP        dextrose 10% bolus 250 mL 250 mL  25 g Intravenous PRN Valeria Patel NP        digoxin tablet 0.125 mg  0.125 mg Oral Daily Valeria Patel, NP   0.125 mg at 06/30/24 0832    EScitalopram oxalate tablet 20 mg  20 mg Oral QHS Valeria Patel NP   20 mg at 06/30/24 2119    glucagon (human recombinant) injection 1 mg  1 mg Intramuscular PRN Valeria Patel NP        glucose chewable tablet 16 g  16 g Oral PRN Valeria Patel NP        glucose chewable tablet 24 g  24 g Oral PRN Amanda,  Valeria BUENO, BASIA        insulin aspart U-100 pen 0-5 Units  0-5 Units Subcutaneous QID (AC + HS) PRN Valeria Patel, NP        lactated ringers infusion   Intravenous Continuous Darby Obrien MD 75 mL/hr at 06/30/24 2302 New Bag at 06/30/24 2302    lamiVUDine tablet 150 mg  150 mg Oral QHS Valeria Patel, NP   150 mg at 06/30/24 2118    LIDOcaine 5 % patch 1 patch  1 patch Transdermal Q24H Valeria Patel, NP   1 patch at 06/28/24 2203    melatonin tablet 6 mg  6 mg Oral Nightly PRN Valeria Patel, NP   6 mg at 06/29/24 2339    metronidazole IVPB 500 mg  500 mg Intravenous Q8H Valeria Patel,  mL/hr at 07/01/24 0637 500 mg at 07/01/24 0637    naloxone 0.4 mg/mL injection 0.02 mg  0.02 mg Intravenous PRN Valeria Patel, BASIA        ondansetron injection 4 mg  4 mg Intravenous Q6H PRN Valeria Patel, NP   4 mg at 07/01/24 0245    oxyCODONE immediate release tablet 5 mg  5 mg Oral Q6H PRN Valeria Patel, NP   5 mg at 06/30/24 2121    oxyCODONE immediate release tablet Tab 10 mg  10 mg Oral Q6H PRN Valeria Patel, NP   10 mg at 07/01/24 0634    prochlorperazine injection Soln 5 mg  5 mg Intravenous Q6H PRN Valeria Patel, NP        sodium chloride 0.9% flush 10 mL  10 mL Intravenous Q8H PRN Valeria Patel, NP        sodium chloride 0.9% flush 10 mL  10 mL Intravenous Q6H Darby Obrien MD   10 mL at 06/30/24 1713    And    sodium chloride 0.9% flush 10 mL  10 mL Intravenous PRN Darby Obrien MD           Intake and Output:    Intake/Output Summary (Last 24 hours) at 7/1/2024 0718  Last data filed at 7/1/2024 0637  Gross per 24 hour   Intake 4103.94 ml   Output 200 ml   Net 3903.94 ml       Labs:  Lab Results   Component Value Date/Time    WBC 5.59 07/01/2024 04:59 AM    HGB 12.9 (L) 07/01/2024 04:59 AM    HCT 38.4 (L) 07/01/2024 04:59 AM    HCT 52 04/26/2024 05:18 PM     07/01/2024 04:59 AM    MCV 89 07/01/2024 04:59 AM     (L) 07/01/2024 04:59 AM    K 4.1  07/01/2024 04:59 AM     07/01/2024 04:59 AM    CO2 24 07/01/2024 04:59 AM    CO2 11 (H) 11/04/2021 04:34 PM    BUN 6 (L) 07/01/2024 04:59 AM    CREATININE 0.7 07/01/2024 04:59 AM    GLU 89 07/01/2024 04:59 AM    CALCIUM 7.5 (L) 07/01/2024 04:59 AM    MG 1.7 06/30/2024 04:31 AM    PHOS 2.8 06/26/2024 11:02 PM    INR 1.4 (H) 06/28/2024 10:31 PM    APTT 28.8 06/28/2024 10:31 PM   ]  Lab Results   Component Value Date/Time    PROT 4.9 (L) 07/01/2024 04:59 AM    ALBUMIN 1.5 (L) 07/01/2024 04:59 AM    ALBUMIN 4.1 06/21/2022 07:23 AM    BILITOT 3.8 (H) 07/01/2024 04:59 AM    BILIDIR 3.0 (H) 06/30/2024 04:31 AM     (H) 07/01/2024 04:59 AM    ALT 98 (H) 07/01/2024 04:59 AM    ALKPHOS 472 (H) 07/01/2024 04:59 AM   ]    Imaging and Procedures:  Labs and imaging results were reviewed.  RUQ U/S 6/28/24:  Distended gallbladder. No gallstones or ultrasonographic evidence to suggest acute cholecystitis.     CT A/P 6/27/24:  Diffuse colitis, of numerous possible etiologies.  Correlate clinically.  Gastroduodenitis, likely with some proximal jejunal enteritis.  Small quantity of intraperitoneal free fluid.  No pneumoperitoneum.  Bilateral nonobstructing intrarenal calculi.  Profound diffuse hepatic steatosis.  Correlate clinically.  Consider outpatient referral to a hepatology/gastroenterology.  Bibasilar pulmonary opacities similar to 06/02/2024, possibly representing subsegmental atelectasis and scarring.  Infectious, inflammatory, or neoplastic process is not excluded.  Correlate clinically, and with follow-up outpatient chest CT within 3-6 months.    Assessment:  Nithin Wagner is a 68 y.o. male with a history of HTN, DM2, HLD, Hep B - cleared (Surface Ag neg), RA, NAFLD, prior CVA, CAD s/p PCI and diastolic CHF w/ atrial myxoma who presented with weakness, N/V, diarrhea and dehydration.  GI consulted for abnormal LFTs.       Plan:  - daily LFTs - cholestatic pattern  - await MRCP  - avoid hepatotoxic meds  - C. Diff  negative  - can continue empiric antibiotics  - check Hep B surface Ab and core total Ab - was previously core Ab positive - if now surface Ab positive, he may have cleared Hep B    Janet Win MD

## 2024-07-01 NOTE — TELEPHONE ENCOUNTER
"----- Message from [employer] [place] sent at 7/1/2024  2:07 PM CDT -----  Reschedule Existing Appointment    Appt Date: 7/8    Type of appt: Annual F/u    Physician: Kayla    Reason for rescheduling? Pt is currently admitted;  Requesting to r/s for soonest available    Caller: Self    Contact Preference: [phone]    Additional Information:  "Thank you for all that you do for our patients"  "

## 2024-07-01 NOTE — TELEPHONE ENCOUNTER
Spoke with patient he states he is currently admitted and states he maybe still in hospital for appointment on 7/8/24. Informed patient we will keep appointment and if patient is still admitted the day of appointment we will reschedule when patient is discharged. Patient verbalized understanding.

## 2024-07-01 NOTE — PLAN OF CARE
AAO x 4.  Plan of care reviewed during his assessment.  Left fore arm skin tear noted with intact island dressing.  Patient with chronic back pain.  He prefers to lay flat in bed, but I feel this exacerbates his nausea.  He is unsteady on his feet and requires one person assist to the bedside commode.  One bowel movement during the early part of the shift was soft/liquid.  Lab results from earlier specimen shows NEGATIVE results for both Cdiff antigen and toxin.  HS accu check at 78.  No sliding scale needed.  Patient unable to keep hardly anything down regardless of position.  He only took a few sips of his diet for dinner last night. No temps recorded.  IV site to the left upper arm infusing without issue.  He has received PO Oxycodone and IVP Zofran during the shift.  One person assist is required when out of bed.  Call light within reach.  Room near nurses' station.  Rounding maintained per protocol.      Problem: Adult Inpatient Plan of Care  Goal: Plan of Care Review  Outcome: Progressing  Flowsheets (Taken 7/1/2024 0309)  Plan of Care Reviewed With: patient     Problem: Adult Inpatient Plan of Care  Goal: Absence of Hospital-Acquired Illness or Injury  Intervention: Identify and Manage Fall Risk  Flowsheets (Taken 7/1/2024 0309)  Safety Promotion/Fall Prevention:   assistive device/personal item within reach   bed alarm refused   commode/urinal/bedpan at bedside   Fall Risk reviewed with patient/family   medications reviewed   lighting adjusted   high risk medications identified   nonskid shoes/socks when out of bed   room near unit station   side rails raised x 2   instructed to call staff for mobility     Problem: Adult Inpatient Plan of Care  Goal: Absence of Hospital-Acquired Illness or Injury  Intervention: Prevent Skin Injury  Flowsheets (Taken 7/1/2024 0309)  Body Position: position changed independently  Device Skin Pressure Protection: absorbent pad utilized/changed     Problem: Adult Inpatient Plan  of Care  Goal: Optimal Comfort and Wellbeing  Outcome: Progressing  Intervention: Monitor Pain and Promote Comfort  Flowsheets (Taken 7/1/2024 0309)  Pain Management Interventions: pain management plan reviewed with patient/caregiver  Intervention: Provide Person-Centered Care  Flowsheets (Taken 7/1/2024 0309)  Trust Relationship/Rapport:   care explained   choices provided   emotional support provided   empathic listening provided   questions encouraged   reassurance provided   thoughts/feelings acknowledged     Problem: Diabetes Comorbidity  Goal: Blood Glucose Level Within Targeted Range  Outcome: Progressing  Intervention: Monitor and Manage Glycemia  Flowsheets (Taken 7/1/2024 0309)  Glycemic Management: blood glucose monitored     Problem: Wound  Goal: Optimal Coping  Outcome: Progressing  Intervention: Support Patient and Family Response  Flowsheets (Taken 7/1/2024 0309)  Supportive Measures:   active listening utilized   decision-making supported   self-care encouraged   self-reflection promoted   self-responsibility promoted   positive reinforcement provided   verbalization of feelings encouraged  Family/Support System Care: support provided     Problem: Wound  Goal: Optimal Functional Ability  Outcome: Progressing  Intervention: Optimize Functional Ability  Flowsheets (Taken 7/1/2024 0309)  Activity Management: Up to bedside commode - L3     Problem: Wound  Goal: Absence of Infection Signs and Symptoms  Outcome: Progressing  Intervention: Prevent or Manage Infection  Flowsheets (Taken 7/1/2024 0309)  Fever Reduction/Comfort Measures:   lightweight bedding   lightweight clothing  Infection Management: aseptic technique maintained     Problem: Wound  Goal: Skin Health and Integrity  Outcome: Progressing  Intervention: Optimize Skin Protection  Flowsheets (Taken 7/1/2024 0309)  Pressure Reduction Techniques: frequent weight shift encouraged  Pressure Reduction Devices: pressure-redistributing mattress  utilized  Activity Management: Up to bedside commode - L3  Head of Bed (HOB) Positioning: HOB lowered     Problem: Infection  Goal: Absence of Infection Signs and Symptoms  Outcome: Progressing  Intervention: Prevent or Manage Infection  Flowsheets (Taken 7/1/2024 0309)  Fever Reduction/Comfort Measures:   lightweight bedding   lightweight clothing  Infection Management: aseptic technique maintained     Problem: Nausea and Vomiting  Goal: Nausea and Vomiting Relief  Outcome: Progressing  Intervention: Prevent and Manage Nausea and Vomiting  Flowsheets (Taken 7/1/2024 0309)  Environmental Support:   calm environment promoted   comfort object encouraged

## 2024-07-02 LAB
ALBUMIN FLD-MCNC: <0.4 G/DL
ALBUMIN SERPL BCP-MCNC: 1.4 G/DL (ref 3.5–5.2)
ALP SERPL-CCNC: 457 U/L (ref 55–135)
ALT SERPL W/O P-5'-P-CCNC: 87 U/L (ref 10–44)
ANION GAP SERPL CALC-SCNC: 6 MMOL/L (ref 8–16)
APPEARANCE FLD: CLEAR
AST SERPL-CCNC: 126 U/L (ref 10–40)
BACTERIA BLD CULT: NORMAL
BACTERIA BLD CULT: NORMAL
BASOPHILS # BLD AUTO: 0.05 K/UL (ref 0–0.2)
BASOPHILS NFR BLD: 0.9 % (ref 0–1.9)
BILIRUB SERPL-MCNC: 3.7 MG/DL (ref 0.1–1)
BODY FLD TYPE: NORMAL
BUN SERPL-MCNC: 5 MG/DL (ref 8–23)
CALCIUM SERPL-MCNC: 7.2 MG/DL (ref 8.7–10.5)
CHLORIDE SERPL-SCNC: 101 MMOL/L (ref 95–110)
CO2 SERPL-SCNC: 24 MMOL/L (ref 23–29)
COLOR FLD: YELLOW
CREAT SERPL-MCNC: 0.6 MG/DL (ref 0.5–1.4)
DIFFERENTIAL METHOD BLD: ABNORMAL
EOSINOPHIL # BLD AUTO: 0.1 K/UL (ref 0–0.5)
EOSINOPHIL NFR BLD: 2.5 % (ref 0–8)
ERYTHROCYTE [DISTWIDTH] IN BLOOD BY AUTOMATED COUNT: 15.9 % (ref 11.5–14.5)
EST. GFR  (NO RACE VARIABLE): >60 ML/MIN/1.73 M^2
GLUCOSE SERPL-MCNC: 77 MG/DL (ref 70–110)
GRAM STN SPEC: NORMAL
GRAM STN SPEC: NORMAL
HBV CORE AB SERPL QL IA: REACTIVE
HBV SURFACE AB SER-ACNC: 170.49 MIU/ML
HBV SURFACE AB SER-ACNC: REACTIVE M[IU]/ML
HCT VFR BLD AUTO: 35.5 % (ref 40–54)
HGB BLD-MCNC: 12.4 G/DL (ref 14–18)
IMM GRANULOCYTES # BLD AUTO: 0.02 K/UL (ref 0–0.04)
IMM GRANULOCYTES NFR BLD AUTO: 0.4 % (ref 0–0.5)
INR PPP: 1.5 (ref 0.8–1.2)
LYMPHOCYTES # BLD AUTO: 1.5 K/UL (ref 1–4.8)
LYMPHOCYTES NFR BLD: 26.1 % (ref 18–48)
LYMPHOCYTES NFR FLD MANUAL: 31 %
MAGNESIUM SERPL-MCNC: 1.5 MG/DL (ref 1.6–2.6)
MCH RBC QN AUTO: 30.8 PG (ref 27–31)
MCHC RBC AUTO-ENTMCNC: 34.9 G/DL (ref 32–36)
MCV RBC AUTO: 88 FL (ref 82–98)
MONOCYTES # BLD AUTO: 0.5 K/UL (ref 0.3–1)
MONOCYTES NFR BLD: 9.2 % (ref 4–15)
MONOS+MACROS NFR FLD MANUAL: 66 %
NEUTROPHILS # BLD AUTO: 3.4 K/UL (ref 1.8–7.7)
NEUTROPHILS NFR BLD: 60.9 % (ref 38–73)
NEUTROPHILS NFR FLD MANUAL: 3 %
NRBC BLD-RTO: 0 /100 WBC
PHOSPHATE SERPL-MCNC: 2.9 MG/DL (ref 2.7–4.5)
PLATELET # BLD AUTO: 225 K/UL (ref 150–450)
PMV BLD AUTO: 10.5 FL (ref 9.2–12.9)
POCT GLUCOSE: 74 MG/DL (ref 70–110)
POCT GLUCOSE: 79 MG/DL (ref 70–110)
POCT GLUCOSE: 84 MG/DL (ref 70–110)
POTASSIUM SERPL-SCNC: 3.9 MMOL/L (ref 3.5–5.1)
PROT FLD-MCNC: <1 G/DL
PROT SERPL-MCNC: 4.6 G/DL (ref 6–8.4)
PROTHROMBIN TIME: 16.2 SEC (ref 9–12.5)
RBC # BLD AUTO: 4.02 M/UL (ref 4.6–6.2)
SODIUM SERPL-SCNC: 131 MMOL/L (ref 136–145)
SPECIMEN SOURCE: NORMAL
SPECIMEN SOURCE: NORMAL
WBC # BLD AUTO: 5.63 K/UL (ref 3.9–12.7)
WBC # FLD: 51 /CU MM

## 2024-07-02 PROCEDURE — 25000003 PHARM REV CODE 250: Performed by: HOSPITALIST

## 2024-07-02 PROCEDURE — 89051 BODY FLUID CELL COUNT: CPT | Performed by: INTERNAL MEDICINE

## 2024-07-02 PROCEDURE — 87075 CULTR BACTERIA EXCEPT BLOOD: CPT | Performed by: INTERNAL MEDICINE

## 2024-07-02 PROCEDURE — 86706 HEP B SURFACE ANTIBODY: CPT | Mod: 91 | Performed by: INTERNAL MEDICINE

## 2024-07-02 PROCEDURE — 83735 ASSAY OF MAGNESIUM: CPT | Performed by: HOSPITALIST

## 2024-07-02 PROCEDURE — 99223 1ST HOSP IP/OBS HIGH 75: CPT | Mod: ,,, | Performed by: RADIOLOGY

## 2024-07-02 PROCEDURE — 11000001 HC ACUTE MED/SURG PRIVATE ROOM

## 2024-07-02 PROCEDURE — 36415 COLL VENOUS BLD VENIPUNCTURE: CPT | Performed by: HOSPITALIST

## 2024-07-02 PROCEDURE — 84100 ASSAY OF PHOSPHORUS: CPT | Performed by: HOSPITALIST

## 2024-07-02 PROCEDURE — 63600175 PHARM REV CODE 636 W HCPCS: Performed by: INTERNAL MEDICINE

## 2024-07-02 PROCEDURE — 80053 COMPREHEN METABOLIC PANEL: CPT | Performed by: HOSPITALIST

## 2024-07-02 PROCEDURE — 86704 HEP B CORE ANTIBODY TOTAL: CPT | Performed by: INTERNAL MEDICINE

## 2024-07-02 PROCEDURE — 63600175 PHARM REV CODE 636 W HCPCS: Performed by: HOSPITALIST

## 2024-07-02 PROCEDURE — 87205 SMEAR GRAM STAIN: CPT | Performed by: INTERNAL MEDICINE

## 2024-07-02 PROCEDURE — 85025 COMPLETE CBC W/AUTO DIFF WBC: CPT | Performed by: HOSPITALIST

## 2024-07-02 PROCEDURE — A4216 STERILE WATER/SALINE, 10 ML: HCPCS | Performed by: HOSPITALIST

## 2024-07-02 PROCEDURE — 25000003 PHARM REV CODE 250: Performed by: INTERNAL MEDICINE

## 2024-07-02 PROCEDURE — 87070 CULTURE OTHR SPECIMN AEROBIC: CPT | Performed by: INTERNAL MEDICINE

## 2024-07-02 PROCEDURE — 63600175 PHARM REV CODE 636 W HCPCS: Performed by: NURSE PRACTITIONER

## 2024-07-02 PROCEDURE — 82042 OTHER SOURCE ALBUMIN QUAN EA: CPT | Performed by: INTERNAL MEDICINE

## 2024-07-02 PROCEDURE — 25000003 PHARM REV CODE 250: Performed by: NURSE PRACTITIONER

## 2024-07-02 PROCEDURE — 85610 PROTHROMBIN TIME: CPT | Performed by: HOSPITALIST

## 2024-07-02 PROCEDURE — 0W9G3ZZ DRAINAGE OF PERITONEAL CAVITY, PERCUTANEOUS APPROACH: ICD-10-PCS | Performed by: RADIOLOGY

## 2024-07-02 PROCEDURE — 94761 N-INVAS EAR/PLS OXIMETRY MLT: CPT

## 2024-07-02 PROCEDURE — 84157 ASSAY OF PROTEIN OTHER: CPT | Performed by: INTERNAL MEDICINE

## 2024-07-02 RX ORDER — OXYCODONE HYDROCHLORIDE 10 MG/1
10 TABLET ORAL ONCE
Status: COMPLETED | OUTPATIENT
Start: 2024-07-02 | End: 2024-07-02

## 2024-07-02 RX ORDER — MAGNESIUM SULFATE HEPTAHYDRATE 40 MG/ML
2 INJECTION, SOLUTION INTRAVENOUS ONCE
Status: COMPLETED | OUTPATIENT
Start: 2024-07-02 | End: 2024-07-02

## 2024-07-02 RX ADMIN — DIGOXIN 0.12 MG: 125 TABLET ORAL at 08:07

## 2024-07-02 RX ADMIN — Medication 10 ML: at 09:07

## 2024-07-02 RX ADMIN — OXYCODONE HYDROCHLORIDE 10 MG: 10 TABLET ORAL at 02:07

## 2024-07-02 RX ADMIN — OXYCODONE HYDROCHLORIDE 10 MG: 10 TABLET ORAL at 12:07

## 2024-07-02 RX ADMIN — SODIUM CHLORIDE, PRESERVATIVE FREE 10 ML: 5 INJECTION INTRAVENOUS at 01:07

## 2024-07-02 RX ADMIN — LAMIVUDINE 150 MG: 150 TABLET, FILM COATED ORAL at 08:07

## 2024-07-02 RX ADMIN — PROCHLORPERAZINE EDISYLATE 5 MG: 5 INJECTION INTRAMUSCULAR; INTRAVENOUS at 09:07

## 2024-07-02 RX ADMIN — OXYCODONE HYDROCHLORIDE 10 MG: 10 TABLET ORAL at 08:07

## 2024-07-02 RX ADMIN — OXYCODONE HYDROCHLORIDE 10 MG: 10 TABLET ORAL at 01:07

## 2024-07-02 RX ADMIN — CIPROFLOXACIN 400 MG: 2 INJECTION, SOLUTION INTRAVENOUS at 05:07

## 2024-07-02 RX ADMIN — METRONIDAZOLE 500 MG: 5 INJECTION, SOLUTION INTRAVENOUS at 06:07

## 2024-07-02 RX ADMIN — METRONIDAZOLE 500 MG: 5 INJECTION, SOLUTION INTRAVENOUS at 02:07

## 2024-07-02 RX ADMIN — SODIUM CHLORIDE, PRESERVATIVE FREE 10 ML: 5 INJECTION INTRAVENOUS at 05:07

## 2024-07-02 RX ADMIN — SODIUM CHLORIDE, POTASSIUM CHLORIDE, SODIUM LACTATE AND CALCIUM CHLORIDE: 600; 310; 30; 20 INJECTION, SOLUTION INTRAVENOUS at 08:07

## 2024-07-02 RX ADMIN — ONDANSETRON 4 MG: 2 INJECTION INTRAMUSCULAR; INTRAVENOUS at 08:07

## 2024-07-02 RX ADMIN — MAGNESIUM SULFATE HEPTAHYDRATE 2 G: 40 INJECTION, SOLUTION INTRAVENOUS at 08:07

## 2024-07-02 RX ADMIN — METRONIDAZOLE 500 MG: 5 INJECTION, SOLUTION INTRAVENOUS at 09:07

## 2024-07-02 RX ADMIN — ESCITALOPRAM OXALATE 20 MG: 10 TABLET ORAL at 08:07

## 2024-07-02 RX ADMIN — ONDANSETRON 4 MG: 2 INJECTION INTRAMUSCULAR; INTRAVENOUS at 05:07

## 2024-07-02 RX ADMIN — OXYCODONE HYDROCHLORIDE 10 MG: 10 TABLET ORAL at 05:07

## 2024-07-02 NOTE — PROGRESS NOTES
[facility] Medicine  Progress Note    Patient Name: Nithin Wagner  MRN: [id number]  Patient Class: IP- Inpatient   Admission Date: 6/28/2024  Length of Stay: 3 days  Attending Physician: CARISA Lozano MD  Primary Care Provider: Tushar Drew MD        Subjective:     Principal Problem:Colitis        HPI:  Nithin Wagner is a 68 y.o. male with a past medical history of hepatitis-B, hypertension, diastolic heart failure, diabetes, rheumatoid arthritis, hyponatremia, s/p s/p T11 and L3 kyphoplasty on 4/22/2024 who presents to the ED with lower abdominal pain recurrent onset 4 days ago.  Patient reports associated nausea and vomiting and states he has been experiencing intermittent diarrhea over the past several months.  He denies blood in stool.  Patient  was seen here on i 06/26/2024 for emesis and weakness. Workup showed findings suggestive of colitis and dehydration. He was offered admission at that time but he stated he would prefer to go home. Stated he went home and was unable to hold down significant fluids and came back.  Reports subjective fever a few days ago.  Had a normal bowel movement this morning     ED workup significant for unremarkable CBC, mild hyponatremia 132, T bili 4.1, albumin 1.6,  and .  Lactic acid 1.2.  CT abdomen performed 06/26/2024 showed diffuse colitis.  Patient was started on IV Cipro and Flagyl in the ED and referred to Hospital Medicine.  Patient will be admitted for further evaluation and management.    Overview/Hospital Course:  Mr. Wagner presented with abdominal pain with nausea and vomiting.  Placed in observation status.  Treatment initiated for colitis with IV Cipro and Flagyl, IV fluids, antiemetics and supportive care.  GI consulted.    Interval History: No acute events overnight. Feeling okay this morning; pending paracentesis. Discussed plan of care. No other concerns at this time.    Review of Systems   Constitutional:  Negative  for chills and fever.   Respiratory:  Negative for cough and shortness of breath.    Cardiovascular:  Negative for chest pain and palpitations.   Gastrointestinal:  Positive for abdominal pain and nausea. Negative for vomiting.     Objective:     Vital Signs (Most Recent):  Temp: 98.6 °F (37 °C) (07/02/24 1937)  Pulse: 94 (07/02/24 1937)  Resp: 16 (07/02/24 2052)  BP: 126/60 (07/02/24 1937)  SpO2: (!) 94 % (07/02/24 1937) Vital Signs (24h Range):  Temp:  [97.6 °F (36.4 °C)-98.8 °F (37.1 °C)] 98.6 °F (37 °C)  Pulse:  [83-97] 94  Resp:  [16-18] 16  SpO2:  [92 %-98 %] 94 %  BP: ()/(51-83) 126/60     Weight: 81.5 kg (179 lb 10.8 oz)  Body mass index is 24.37 kg/m².    Intake/Output Summary (Last 24 hours) at 7/2/2024 2225  Last data filed at 7/2/2024 2137  Gross per 24 hour   Intake 2346.81 ml   Output 1500 ml   Net 846.81 ml         Physical Exam  Vitals and nursing note reviewed.   Constitutional:       General: He is not in acute distress.     Appearance: He is well-developed.   HENT:      Head: Normocephalic and atraumatic.   Eyes:      General:         Right eye: No discharge.         Left eye: No discharge.      Conjunctiva/sclera: Conjunctivae normal.   Cardiovascular:      Rate and Rhythm: Normal rate.      Pulses: Normal pulses.   Pulmonary:      Effort: Pulmonary effort is normal. No respiratory distress.   Abdominal:      General: There is distension.      Palpations: Abdomen is soft.      Tenderness: There is abdominal tenderness.   Musculoskeletal:         General: Normal range of motion.      Right lower leg: Edema present.      Left lower leg: Edema present.   Skin:     General: Skin is warm and dry.   Neurological:      Mental Status: He is alert and oriented to person, place, and time.             Significant Labs:   CBC:  Recent Labs   Lab 06/30/24  0431 07/01/24  0459 07/02/24  0442   WBC 4.87 5.59 5.63   HGB 12.3* 12.9* 12.4*   HCT 35.9* 38.4* 35.5*    231 225   GRAN 65.7  3.2 62.7   3.5 60.9  3.4   LYMPH 23.2  1.1 25.9  1.5 26.1  1.5   MONO 8.2  0.4 8.2  0.5 9.2  0.5   EOS 0.1 0.1 0.1   BASO 0.02 0.05 0.05   CMP:  Recent Labs   Lab 06/30/24  0431 07/01/24  0459 07/02/24  0442   * 131* 131*   K 3.6 4.1 3.9    101 101   CO2 22* 24 24   BUN 6* 6* 5*   CREATININE 0.6 0.7 0.6   GLU 83 89 77   CALCIUM 7.2* 7.5* 7.2*   MG 1.7  --  1.5*   PHOS  --   --  2.9   ALKPHOS 419* 472* 457*   * 149* 126*   ALT 89* 98* 87*   BILITOT 3.6* 3.8* 3.7*   PROT 4.4* 4.9* 4.6*   ALBUMIN 1.3* 1.5* 1.4*   ANIONGAP 8 6* 6*      Significant Imaging: I have reviewed and interpreted all pertinent imaging results/findings within the past 24 hours.      Assessment/Plan:      * Colitis  Abnormal LFTs  Hypokalemia  - Patient with intermittent nausea, vomiting and diarrhea.  Reports subjective fever.  CT abdomen with findings concerning for colitis.   - Continue empiric Cipro and Flagyl  - Start IV fluid with lactated Ringer's at 75 cc/hr given patient appears to be volume contracted  - Appreciate GI input, further workup with MRCP was delayed  - Liver function tests with some improvement, cholestatic pattern, but with noted increase today  - Patient with some clinical improvement in symptoms and stool studies negative for C.diff  - MRCP was negative for obstruction, noted gallbladder distention and large amount of ascites  - Case discussed with GI; paracentesis today. Labs ordered. Hepatitis B studies still pending  - Continue trend with repeat laboratory testing and correct electrolytes as needed    SVT (supraventricular tachycardia)  - History SVT noted.  Followed by Cardiology, Dr. Doshi, outpatient.  States he takes digoxin and has had no recent episodes of SVT  - Continue digoxin 0.125 mg p.o. daily  - Monitor on telemetry    Type 2 diabetes mellitus, with long-term current use of insulin  - Recent HgbA1c 7.4 performed on 3/2024.  Patient has not been taking insulin and was on tirzepeptide and  stopped taking in April  - Glucose level reviewed, in normal to slightly elevated range  - Continue monitor with Accu-Cheks q.a.c. and q.h.s. and give low-dose sliding scale insulin as necessary    Chronic diastolic heart failure  - Recent Echo performed 04/03/2024 showed EF 55-60%.  Patient states he no longer takes Lasix.  Lower extremity edema noted, patient denies shortness of breath  - Clinically appears dry, and without volume overload  - Judicious IV fluids as discussed above    NAFLD (nonalcoholic fatty liver disease)  - History noted.  LFTs on admission elevated from baseline with , T bili 4.1,  and .  - As discussed above    Hepatitis B core antibody positive  - Patient states he was exposed to hep B while working in [place] in the 1990s and is on prophylaxis with lamivudine   - Continue lamivudine daily      VTE Risk Mitigation (From admission, onward)           Ordered     IP VTE HIGH RISK PATIENT  Once         06/28/24 2107     Place sequential compression device  Until discontinued         06/28/24 2107                    Discharge Planning   ALEXANDER:      Code Status: Full Code   Is the patient medically ready for discharge?:     Reason for patient still in hospital (select all that apply): Treatment  Discharge Plan A: Home with family                  D Rakesh Lozano MD  Department of Hospital Medicine   [facility] ([place])

## 2024-07-02 NOTE — SUBJECTIVE & OBJECTIVE
Interval History:  No acute events, patient reports abdominal pain worse today and still with nausea and vomiting; stools with improvement with - odor not as foul.    Review of Systems   Constitutional:  Negative for chills and fever.   Respiratory:  Negative for shortness of breath.    Gastrointestinal:  Positive for abdominal pain, nausea and vomiting.     Objective:     Vital Signs (Most Recent):  Temp: 97.6 °F (36.4 °C) (07/01/24 2003)  Pulse: 93 (07/01/24 2003)  Resp: 18 (07/01/24 2103)  BP: 125/80 (07/01/24 2003)  SpO2: (!) 94 % (07/01/24 2003) Vital Signs (24h Range):  Temp:  [97.4 °F (36.3 °C)-98 °F (36.7 °C)] 97.6 °F (36.4 °C)  Pulse:  [82-96] 93  Resp:  [16-20] 18  SpO2:  [93 %-95 %] 94 %  BP: (106-134)/(60-88) 125/80     Weight: 81.5 kg (179 lb 10.8 oz)  Body mass index is 24.37 kg/m².    Intake/Output Summary (Last 24 hours) at 7/1/2024 2125  Last data filed at 7/1/2024 0637  Gross per 24 hour   Intake 1028.82 ml   Output --   Net 1028.82 ml         Physical Exam  Vitals and nursing note reviewed.   Constitutional:       Appearance: He is normal weight. He is ill-appearing (chronically).   HENT:      Head: Normocephalic.      Mouth/Throat:      Mouth: Mucous membranes are moist.      Pharynx: Oropharynx is clear.   Eyes:      General: Lids are normal. Gaze aligned appropriately.      Conjunctiva/sclera: Conjunctivae normal.   Cardiovascular:      Rate and Rhythm: Normal rate and regular rhythm.      Pulses: Normal pulses.      Heart sounds: Normal heart sounds.   Pulmonary:      Effort: Pulmonary effort is normal.      Breath sounds: Normal breath sounds.   Abdominal:      General: Bowel sounds are normal.      Palpations: Abdomen is soft. There is shifting dullness.      Tenderness: There is generalized abdominal tenderness. There is no guarding or rebound.      Comments: Very mild tenderness to palpation, diffusely   Musculoskeletal:         General: Normal range of motion.      Cervical back: Normal  range of motion.      Right lower leg: Edema present.      Left lower leg: Edema present.   Skin:     General: Skin is warm and dry.   Neurological:      Mental Status: He is alert and oriented to person, place, and time. Mental status is at baseline.   Psychiatric:         Mood and Affect: Mood normal.             Significant Labs: All pertinent labs within the past 24 hours have been reviewed.    Significant Imaging: I have reviewed all pertinent imaging results/findings within the past 24 hours.

## 2024-07-02 NOTE — ASSESSMENT & PLAN NOTE
Abnormal LFTs  Hypokalemia  - Patient with intermittent nausea, vomiting and diarrhea.  Reports subjective fever.  CT abdomen with findings concerning for colitis.   - Continue empiric Cipro and Flagyl  - Start IV fluid with lactated Ringer's at 75 cc/hr given patient appears to be volume contracted  - Appreciate GI input, further workup with MRCP was delayed  - Liver function tests with some improvement, cholestatic pattern, but with noted increase today  - Patient with some clinical improvement in symptoms and stool studies negative for C.diff  - MRCP was negative for obstruction, noted gallbladder distention and large amount of ascites  - Case discussed with GI, will do further workup with paracentesis.  Placed consult to IR for tomorrow.  Hepatitis B studies still pending  - Continue trend with repeat laboratory testing and correct electrolytes as needed

## 2024-07-02 NOTE — PLAN OF CARE
Recommendations  1) Adv diet to clear liquid diet - continue to adv to low fiber diet as tolerated    2) Add Boost Breeze while on clear liquid for extra kcal/protein   3) Consider MVI daily given poor PO intake    4) Monitor for alternate means of nutrition if unable to adv oral diet - RD to follow up      Goals: Pt will have diet adv and able to tolerate 50-75% solid PO by RD follow up  Nutrition Goal Status: new  Communication of RD Recs:  (POC)

## 2024-07-02 NOTE — PROGRESS NOTES
[facility] Medicine  Progress Note    Patient Name: Nithin Wagner  MRN: [id number]  Patient Class: IP- Inpatient   Admission Date: 6/28/2024  Length of Stay: 2 days  Attending Physician: Darby Obrien MD  Primary Care Provider: Tushar Drew MD        Subjective:     Principal Problem:Colitis        HPI:  Nithin Wagner is a 68 y.o. male with a past medical history of hepatitis-B, hypertension, diastolic heart failure, diabetes, rheumatoid arthritis, hyponatremia, s/p s/p T11 and L3 kyphoplasty on 4/22/2024 who presents to the ED with lower abdominal pain recurrent onset 4 days ago.  Patient reports associated nausea and vomiting and states he has been experiencing intermittent diarrhea over the past several months.  He denies blood in stool.  Patient  was seen here on i 06/26/2024 for emesis and weakness. Workup showed findings suggestive of colitis and dehydration. He was offered admission at that time but he stated he would prefer to go home. Stated he went home and was unable to hold down significant fluids and came back.  Reports subjective fever a few days ago.  Had a normal bowel movement this morning     ED workup significant for unremarkable CBC, mild hyponatremia 132, T bili 4.1, albumin 1.6,  and .  Lactic acid 1.2.  CT abdomen performed 06/26/2024 showed diffuse colitis.  Patient was started on IV Cipro and Flagyl in the ED and referred to Hospital Medicine.  Patient will be admitted for further evaluation and management.    Overview/Hospital Course:  Mr. Wagner presented with abdominal pain with nausea and vomiting.  Placed in observation status.  Treatment initiated for colitis with IV Cipro and Flagyl, IV fluids, antiemetics and supportive care.  GI consulted.    Interval History:  No acute events, patient reports abdominal pain worse today and still with nausea and vomiting; stools with improvement with - odor not as foul.    Review of Systems   Constitutional:   Negative for chills and fever.   Respiratory:  Negative for shortness of breath.    Gastrointestinal:  Positive for abdominal pain, nausea and vomiting.     Objective:     Vital Signs (Most Recent):  Temp: 97.6 °F (36.4 °C) (07/01/24 2003)  Pulse: 93 (07/01/24 2003)  Resp: 18 (07/01/24 2103)  BP: 125/80 (07/01/24 2003)  SpO2: (!) 94 % (07/01/24 2003) Vital Signs (24h Range):  Temp:  [97.4 °F (36.3 °C)-98 °F (36.7 °C)] 97.6 °F (36.4 °C)  Pulse:  [82-96] 93  Resp:  [16-20] 18  SpO2:  [93 %-95 %] 94 %  BP: (106-134)/(60-88) 125/80     Weight: 81.5 kg (179 lb 10.8 oz)  Body mass index is 24.37 kg/m².    Intake/Output Summary (Last 24 hours) at 7/1/2024 2125  Last data filed at 7/1/2024 0637  Gross per 24 hour   Intake 1028.82 ml   Output --   Net 1028.82 ml         Physical Exam  Vitals and nursing note reviewed.   Constitutional:       Appearance: He is normal weight. He is ill-appearing (chronically).   HENT:      Head: Normocephalic.      Mouth/Throat:      Mouth: Mucous membranes are moist.      Pharynx: Oropharynx is clear.   Eyes:      General: Lids are normal. Gaze aligned appropriately.      Conjunctiva/sclera: Conjunctivae normal.   Cardiovascular:      Rate and Rhythm: Normal rate and regular rhythm.      Pulses: Normal pulses.      Heart sounds: Normal heart sounds.   Pulmonary:      Effort: Pulmonary effort is normal.      Breath sounds: Normal breath sounds.   Abdominal:      General: Bowel sounds are normal.      Palpations: Abdomen is soft. There is shifting dullness.      Tenderness: There is generalized abdominal tenderness. There is no guarding or rebound.      Comments: Very mild tenderness to palpation, diffusely   Musculoskeletal:         General: Normal range of motion.      Cervical back: Normal range of motion.      Right lower leg: Edema present.      Left lower leg: Edema present.   Skin:     General: Skin is warm and dry.   Neurological:      Mental Status: He is alert and oriented to person,  place, and time. Mental status is at baseline.   Psychiatric:         Mood and Affect: Mood normal.             Significant Labs: All pertinent labs within the past 24 hours have been reviewed.    Significant Imaging: I have reviewed all pertinent imaging results/findings within the past 24 hours.    Assessment/Plan:      * Colitis  Abnormal LFTs  Hypokalemia  - Patient with intermittent nausea, vomiting and diarrhea.  Reports subjective fever.  CT abdomen with findings concerning for colitis.   - Continue empiric Cipro and Flagyl  - Start IV fluid with lactated Ringer's at 75 cc/hr given patient appears to be volume contracted  - Appreciate GI input, further workup with MRCP was delayed  - Liver function tests with some improvement, cholestatic pattern, but with noted increase today  - Patient with some clinical improvement in symptoms and stool studies negative for C.diff  - MRCP was negative for obstruction, noted gallbladder distention and large amount of ascites  - Case discussed with GI, will do further workup with paracentesis.  Placed consult to IR for tomorrow.  Hepatitis B studies still pending  - Continue trend with repeat laboratory testing and correct electrolytes as needed    SVT (supraventricular tachycardia)  - History SVT noted.  Followed by Cardiology, Dr. Doshi, outpatient.  States he takes digoxin and has had no recent episodes of SVT  - Continue digoxin 0.125 mg p.o. daily  - Monitor on telemetry    Type 2 diabetes mellitus, with long-term current use of insulin  - Recent HgbA1c 7.4 performed on 3/2024.  Patient has not been taking insulin and was on tirzepeptide and stopped taking in April  - Glucose level reviewed, in normal to slightly elevated range  - Continue monitor with Accu-Cheks q.a.c. and q.h.s. and give low-dose sliding scale insulin as necessary    Chronic diastolic heart failure  - Recent Echo performed 04/03/2024 showed EF 55-60%.  Patient states he no longer takes Lasix.  Lower  extremity edema noted, patient denies shortness of breath  - Clinically appears dry, and without volume overload  - Judicious IV fluids as discussed above    NAFLD (nonalcoholic fatty liver disease)  - History noted.  LFTs on admission elevated from baseline with , T bili 4.1,  and .  - As discussed above    Hepatitis B core antibody positive  - Patient states he was exposed to hep B while working in [place] in the 1990s and is on prophylaxis with lamivudine   - Continue lamivudine daily      VTE Risk Mitigation (From admission, onward)           Ordered     IP VTE HIGH RISK PATIENT  Once         06/28/24 2107     Place sequential compression device  Until discontinued         06/28/24 2107                          Darby Obrien MD  Department of Hospital Medicine   [facility] ([place])

## 2024-07-02 NOTE — PLAN OF CARE
Problem: Adult Inpatient Plan of Care  Goal: Plan of Care Review  Outcome: Progressing  Goal: Patient-Specific Goal (Individualized)  Outcome: Progressing  Goal: Absence of Hospital-Acquired Illness or Injury  Outcome: Progressing  Goal: Optimal Comfort and Wellbeing  Outcome: Progressing  Goal: Readiness for Transition of Care  Outcome: Progressing     Problem: Diabetes Comorbidity  Goal: Blood Glucose Level Within Targeted Range  Outcome: Progressing     Problem: Wound  Goal: Optimal Coping  Outcome: Progressing  Goal: Optimal Functional Ability  Outcome: Progressing  Goal: Absence of Infection Signs and Symptoms  Outcome: Progressing  Goal: Improved Oral Intake  Outcome: Progressing  Goal: Optimal Pain Control and Function  Outcome: Progressing  Goal: Skin Health and Integrity  Outcome: Progressing  Goal: Optimal Wound Healing  Outcome: Progressing     Problem: Infection  Goal: Absence of Infection Signs and Symptoms  Outcome: Progressing     Problem: Skin Injury Risk Increased  Goal: Skin Health and Integrity  Outcome: Progressing     Problem: Nausea and Vomiting  Goal: Nausea and Vomiting Relief  Outcome: Progressing

## 2024-07-02 NOTE — PROGRESS NOTES
Gastroenterology Progress Note    Reason for Consult: N/V, abnormal LFTs, abdominal pain, diarrhea    Subjective:  Mild abdominal discomfort, no new complaints    ROS:  Gen: no F/C  CV: no CP/palpitations  Resp: no SOB/wheezing    Physical Exam  /69 (BP Location: Right arm, Patient Position: Lying)   Pulse 83   Temp 98.8 °F (37.1 °C) (Oral)   Resp 18   Ht 6' (1.829 m)   Wt 81.5 kg (179 lb 10.8 oz)   SpO2 (!) 93%   BMI 24.37 kg/m²   General: Awake, alert male in no apparent distress  HEENT: NC/AT, PERRL, EOMI, MMM  CV: RRR, without murmur, gallop, or rubs  Pulm: CTAB, no wheezing, rales, or rhonchi  GI: soft, TTP in RUQ and epigastric region, mildly distended, +BS  MSK: no edema and normal ROM  Psych: normal mood and affect    Medications/Infusions:  Current Facility-Administered Medications   Medication Dose Route Frequency Provider Last Rate Last Admin    acetaminophen tablet 650 mg  650 mg Oral Q8H PRN Valeria Patel, BASIA        ciprofloxacin (CIPRO)400mg/200ml D5W IVPB 400 mg  400 mg Intravenous Q12H Valeria Patel NP   Stopped at 07/02/24 1801    dextrose 10% bolus 125 mL 125 mL  12.5 g Intravenous PRN Valeria Patel NP        dextrose 10% bolus 250 mL 250 mL  25 g Intravenous PRN Valeria Patel NP        digoxin tablet 0.125 mg  0.125 mg Oral Daily Valeria Patel NP   0.125 mg at 07/02/24 0822    EScitalopram oxalate tablet 20 mg  20 mg Oral QHS Valeria Patel NP   20 mg at 07/01/24 2053    glucagon (human recombinant) injection 1 mg  1 mg Intramuscular PRN Valeria Patel, NP        glucose chewable tablet 16 g  16 g Oral PRN Valeria Patel, NP        glucose chewable tablet 24 g  24 g Oral PRN Valeria Patel, NP        insulin aspart U-100 pen 0-5 Units  0-5 Units Subcutaneous QID (AC + HS) PRN Valeria Patel NP        lactated ringers infusion   Intravenous Continuous Darby Obrien MD 50 mL/hr at 07/01/24 2216 Rate Change at 07/01/24 2212     lamiVUDine tablet 150 mg  150 mg Oral QHS Valeria Patel, NP   150 mg at 07/01/24 2053    LIDOcaine 5 % patch 1 patch  1 patch Transdermal Q24H Valeria Patel, NP   1 patch at 06/28/24 2203    melatonin tablet 6 mg  6 mg Oral Nightly PRN Valeria Patel, NP   6 mg at 06/29/24 2339    metronidazole IVPB 500 mg  500 mg Intravenous Q8H Valeria Patel, NP   Stopped at 07/02/24 1535    naloxone 0.4 mg/mL injection 0.02 mg  0.02 mg Intravenous PRN Valeria Patel, BASIA        ondansetron injection 4 mg  4 mg Intravenous Q6H PRN Valeria Patel, NP   4 mg at 07/02/24 1758    oxyCODONE immediate release tablet Tab 10 mg  10 mg Oral Q4H PRN Darby Obrien MD   10 mg at 07/02/24 1700    oxyCODONE immediate release tablet Tab 20 mg  20 mg Oral Q4H PRN Darby Obrien MD   20 mg at 07/01/24 2103    prochlorperazine injection Soln 5 mg  5 mg Intravenous Q6H PRN Valeria Patel, BASIA        sodium chloride 0.9% flush 10 mL  10 mL Intravenous Q6H Darby Obrien MD   10 mL at 07/02/24 0513    And    sodium chloride 0.9% flush 10 mL  10 mL Intravenous PRN Darby Obrien MD           Intake and Output:    Intake/Output Summary (Last 24 hours) at 7/2/2024 1805  Last data filed at 7/2/2024 1310  Gross per 24 hour   Intake --   Output 1250 ml   Net -1250 ml       Labs:  Lab Results   Component Value Date/Time    WBC 5.63 07/02/2024 04:42 AM    HGB 12.4 (L) 07/02/2024 04:42 AM    HCT 35.5 (L) 07/02/2024 04:42 AM    HCT 52 04/26/2024 05:18 PM     07/02/2024 04:42 AM    MCV 88 07/02/2024 04:42 AM     (L) 07/02/2024 04:42 AM    K 3.9 07/02/2024 04:42 AM     07/02/2024 04:42 AM    CO2 24 07/02/2024 04:42 AM    CO2 11 (H) 11/04/2021 04:34 PM    BUN 5 (L) 07/02/2024 04:42 AM    CREATININE 0.6 07/02/2024 04:42 AM    GLU 77 07/02/2024 04:42 AM    CALCIUM 7.2 (L) 07/02/2024 04:42 AM    MG 1.5 (L) 07/02/2024 04:42 AM    PHOS 2.9 07/02/2024 04:42 AM    INR 1.5 (H) 07/02/2024 04:42 AM    APTT 28.8 06/28/2024  10:31 PM   ]  Lab Results   Component Value Date/Time    PROT 4.6 (L) 07/02/2024 04:42 AM    ALBUMIN 1.4 (L) 07/02/2024 04:42 AM    ALBUMIN 4.1 06/21/2022 07:23 AM    BILITOT 3.7 (H) 07/02/2024 04:42 AM    BILIDIR 3.0 (H) 06/30/2024 04:31 AM     (H) 07/02/2024 04:42 AM    ALT 87 (H) 07/02/2024 04:42 AM    ALKPHOS 457 (H) 07/02/2024 04:42 AM   ]    Imaging and Procedures:  Labs and imaging results were reviewed.  RUQ U/S 6/28/24:  Distended gallbladder. No gallstones or ultrasonographic evidence to suggest acute cholecystitis.     CT A/P 6/27/24:  Diffuse colitis, of numerous possible etiologies.  Correlate clinically.  Gastroduodenitis, likely with some proximal jejunal enteritis.  Small quantity of intraperitoneal free fluid.  No pneumoperitoneum.  Bilateral nonobstructing intrarenal calculi.  Profound diffuse hepatic steatosis.  Correlate clinically.  Consider outpatient referral to a hepatology/gastroenterology.  Bibasilar pulmonary opacities similar to 06/02/2024, possibly representing subsegmental atelectasis and scarring.  Infectious, inflammatory, or neoplastic process is not excluded.  Correlate clinically, and with follow-up outpatient chest CT within 3-6 months.    Assessment:  Nithin Wagnre is a 68 y.o. male with a history of HTN, DM2, HLD, Hep B - cleared (Surface Ag neg), RA, NAFLD, prior CVA, CAD s/p PCI and diastolic CHF w/ atrial myxoma who presented with weakness, N/V, diarrhea and dehydration.  GI consulted for abnormal LFTs.   Imaging revealed ascites.  Paracentesis was done today.  Albumin was not performed, so the SAAG could not be calculated.  However, it is most likely transudative.  No evidence of SBP.  Question of cirrhosis.  We will check autoimmune serology.    Plan:  - check autoimmune serology  - monitor LFTs

## 2024-07-02 NOTE — PROGRESS NOTES
[facility] - [facility] ([facility])  Adult Nutrition  Progress Note    SUMMARY       Recommendations  1) Adv diet to clear liquid diet - continue to adv to low fiber diet as tolerated    2) Add Boost Breeze while on clear liquid for extra kcal/protein   3) Consider MVI daily given poor PO intake    4) Monitor for alternate means of nutrition if unable to adv oral diet - RD to follow up      Goals: Pt will have diet adv and able to tolerate 50-75% solid PO by RD follow up  Nutrition Goal Status: new  Communication of RD Recs:  (POC)    Assessment and Plan  Nutrition Problem  Inadequate energy - protein intake    Related to (etiology):   Altered GI function    Signs and Symptoms (as evidenced by):   Significant weight loss x 6 months  Abdominal pain with N/V/D  NPO/CLD     Interventions  (treatment strategy):  Collaboration with other providers    Nutrition Diagnosis Status:   New      Malnutrition Assessment             Weight Loss (Malnutrition): greater than 10% in 6 months  Energy Intake (Malnutrition): less than or equal to 50% for greater than or equal to 5 days (NPO/CLD since admit 6/28)                         Reason for Assessment    Reason For Assessment: identified at risk by screening criteria  Diagnosis: gastrointestinal disease (colitis)  Relevant Medical History:   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 Pt was seen in ED a few days ago but states  "he has not gotten any better and "was told to come back"     General Information Comments: Patient being t/f for procedure at time of visit. Noted to have poor appetite with recent weight loss and diarrhea. Admitted for colitis, dehydration, and new onset ascites s/p paracentesis. Noted  lb,  lb. NPO. Lactated ringers @ 50 mL/hr. Midline. Felipe 18 - L arm, RLLQ abdomen. Suspect malnutrition. [facility] NFPE d/t patient off for procedure.  Nutrition Discharge Planning: pending medical course    Nutrition Risk Screen    Nutrition Risk Screen: reduced oral intake over the last month, unintentional loss of 10 lbs or more in the past 2 months    Nutrition/Diet History    Factors Affecting Nutritional Intake: abdominal pain, altered gastrointestinal function, decreased appetite, nausea/vomiting, NPO    Anthropometrics    Temp: 97.8 °F (36.6 °C)  Height Method: Stated  Height: 6' (182.9 cm)  Height (inches): 72 in  Weight Method: Bed Scale  Weight: 81.5 kg (179 lb 10.8 oz)  Weight (lb): 179.68 lb  Ideal Body Weight (IBW), Male: 178 lb  % Ideal Body Weight, Male (lb): 100.94 %  BMI (Calculated): 24.4  BMI Grade: 18.5-24.9 - normal  Weight Loss: unintentional  Usual Body Weight (UBW), k kg  % Usual Body Weight: 81.67  % Weight Change From Usual Weight: -18.5 %       Lab/Procedures/Meds    Pertinent Labs Reviewed: reviewed  CBC:  Recent Labs   Lab 24  0442   WBC 5.63   HGB 12.4*   HCT 35.5*        CMP:  Recent Labs   Lab 24  0442   CALCIUM 7.2*   ALBUMIN 1.4*   PROT 4.6*   *   K 3.9   CO2 24      BUN 5*   CREATININE 0.6   ALKPHOS 457*   ALT 87*   *   BILITOT 3.7*     BMP:   Recent Labs   Lab 24  0442   GLU 77   *   K 3.9      CO2 24   BUN 5*   CREATININE 0.6   CALCIUM 7.2*   MG 1.5*       Pertinent Medications Reviewed: reviewed  Scheduled Meds:   ciprofloxacin  400 mg Intravenous Q12H    digoxin  0.125 mg Oral Daily    EScitalopram oxalate  20 mg Oral QHS "    lamiVUDine  150 mg Oral QHS    LIDOcaine  1 patch Transdermal Q24H    metronidazole  500 mg Intravenous Q8H    sodium chloride 0.9%  10 mL Intravenous Q6H     Continuous Infusions:   lactated ringers   Intravenous Continuous 50 mL/hr at 07/01/24 2214 Rate Change at 07/01/24 2214     PRN Meds:.  Current Facility-Administered Medications:     acetaminophen, 650 mg, Oral, Q8H PRN    dextrose 10%, 12.5 g, Intravenous, PRN    dextrose 10%, 25 g, Intravenous, PRN    glucagon (human recombinant), 1 mg, Intramuscular, PRN    glucose, 16 g, Oral, PRN    glucose, 24 g, Oral, PRN    insulin aspart U-100, 0-5 Units, Subcutaneous, QID (AC + HS) PRN    melatonin, 6 mg, Oral, Nightly PRN    naloxone, 0.02 mg, Intravenous, PRN    ondansetron, 4 mg, Intravenous, Q6H PRN    oxyCODONE, 10 mg, Oral, Q4H PRN    oxyCODONE, 20 mg, Oral, Q4H PRN    prochlorperazine, 5 mg, Intravenous, Q6H PRN    Flushing PICC/Midline Protocol, , , Until Discontinued **AND** sodium chloride 0.9%, 10 mL, Intravenous, Q6H **AND** sodium chloride 0.9%, 10 mL, Intravenous, PRN      Estimated/Assessed Needs    Weight Used For Calorie Calculations: 81.5 kg (179 lb 10.8 oz)  Energy Calorie Requirements (kcal): 1803-6307  Energy Need Method: Kcal/kg (25-30)  Protein Requirements:  g (1.2-1.5 g/kg during colitis flare)  Weight Used For Protein Calculations: 81.5 kg (179 lb 10.8 oz)  Fluid Requirements (mL): 1 mL/kcal  Estimated Fluid Requirement Method:  (or per MD)  RDA Method (mL): 2037  CHO Requirement: 250 g (50% EEN)      Nutrition Prescription Ordered    Current Diet Order: NPO    Evaluation of Received Nutrient/Fluid Intake    I/O: + 3.4 L since admit  Energy Calories Required: not meeting needs  Protein Required: not meeting needs  Fluid Required: not meeting needs  Comments: LBM: 6/30  Tolerance: not tolerating  % Intake of Estimated Energy Needs: Other: NPO  % Meal Intake: NPO    Nutrition Risk    Level of Risk/Frequency of Follow-up: moderate      Monitor and Evaluation    Food and Nutrient Intake: energy intake, food and beverage intake  Food and Nutrient Adminstration: diet order  Knowledge/Beliefs/Attitudes: food and nutrition knowledge/skill, beliefs and attitudes  Physical Activity and Function: nutrition-related ADLs and IADLs  Anthropometric Measurements: weight, weight change  Biochemical Data, Medical Tests and Procedures: electrolyte and renal panel, gastrointestinal profile, glucose/endocrine profile, inflammatory profile, lipid profile     Nutrition Follow-Up    RD Follow-up?: Yes    Radha Quintero RDN, [place]

## 2024-07-02 NOTE — PLAN OF CARE
POC reviewed with patient. AAOx4. stable on room air. Complaints of pain treated with PRN pain medication according to MAR. No other complaints verbalized during shift.  BSC and urinal utilized for voiding. Positions self x 1 assist. Turn Q2/frequent weight shift encouraged. Low air loss mattress in use. Instructed to call for help ambulating. No injuries, falls, or trauma occurred during shift. Purposeful rounding completed. Bed low and locked with side rails up x 3 and call light within reach.      Problem: Adult Inpatient Plan of Care  Goal: Plan of Care Review  Outcome: Progressing  Goal: Patient-Specific Goal (Individualized)  Outcome: Progressing  Goal: Absence of Hospital-Acquired Illness or Injury  Outcome: Progressing  Goal: Optimal Comfort and Wellbeing  Outcome: Progressing  Goal: Readiness for Transition of Care  Outcome: Progressing

## 2024-07-02 NOTE — PROCEDURES
Radiology Post-Procedure Note    Pre Op Diagnosis: ascites  Post Op Diagnosis: Same    Procedure: paracentesis    Procedure performed by: Ventura Jordan MD    Written Informed Consent Obtained: Yes  Specimen Removed: YES 1.25 L serous ascites  Estimated Blood Loss: Minimal    Findings:   Paracentesis.    Patient tolerated procedure well.    Ventura Jordan MD

## 2024-07-02 NOTE — CONSULTS
Consult/H&P Note  Interventional Radiology    Consult Requested By: eric    Reason for Consult: ascites    SUBJECTIVE:     Chief Complaint: abdominal pain    History of Present Illness: 67 yo M with abdominal pain, and new onset ascites.    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    forehead    Stroke     TIA     Past Surgical History:   Procedure Laterality Date    CATARACT EXTRACTION W/  INTRAOCULAR LENS IMPLANT Right 3/28/2024    Procedure: EXTRACTION, CATARACT, WITH IOL INSERTION;  Surgeon: Hans Woodward MD;  Location: [facility] OR;  Service: Ophthalmology;  Laterality: Right;    CORONARY ANGIOGRAPHY N/A 3/10/2021    Procedure: ANGIOGRAM, CORONARY ARTERY - right radial;  Surgeon: Shemar Dempsey MD;  Location: [facility] CATH LAB;  Service: Cardiology;  Laterality: N/A;    CORONARY STENT PLACEMENT  03/10/2021    prox-mid RCA [place] 4.5 x 26 mm, 4.5 x 12 mm    FIXATION KYPHOPLASTY N/A 4/22/2024    Procedure: KYPHOPLASTY;  Surgeon: Stefan Stern MD;  Location: [facility] OR;  Service: Pain Management;  Laterality: N/A;  NO PROPOFOL    INCISION AND DRAINAGE OF SCROTUM N/A 11/15/2022    Procedure: INCISION AND DRAINAGE, SCROTUM;  Surgeon: William Hatch MD;  Location: [facility] OR;  Service: Urology;  Laterality: N/A;    INCISION AND DRAINAGE OF SCROTUM N/A 11/17/2022    Procedure: INCISION AND DRAINAGE, SCROTUM;  Surgeon: William Hatch MD;  Location: [facility] OR;  Service: Urology;  Laterality: N/A;    LUNG LOBECTOMY Right 2008    RUL lobectomy after removal of atrial myxoma    MAGNETIC RESONANCE IMAGING N/A 4/30/2024    Procedure: MRI  (Magnetic Resonance Imagine);  Surgeon: SurgeonPeace;  Location: [facility];  Service: Anesthesiology;  Laterality: N/A;    PLEURA BIOPSY      RESECTION OF ATRIAL MYXOMA  2007     Family History   Problem Relation Name Age of Onset    Hodgkin's lymphoma Mother      Diabetes type II Mother      Kidney failure Father      Diabetes type I Father      Cancer Sister       Social History     Tobacco Use    Smoking status: Every Day     Current packs/day: 1.00     Average packs/day: 1 pack/day for 35.0 years (35.0 ttl pk-yrs)     Types: Cigarettes    Smokeless tobacco: Never   Substance Use Topics    Alcohol use: Not Currently    Drug use: No       Review of Systems:  Constitutional/General:Negative for fever.  Hematological/Immuno: no known coagulopathies  Respiratory: no shortness of breath  Cardiovascular: no chest pain  Gastrointestinal: positive for - abdominal pain and gas/bloating  Genito-Urinary: no dysuria  Musculoskeletal: negative  Skin: Negative for rash, itching, pigmentation changes, nail or hair changes.  Neurological: no TIA or stroke symptoms  Psychiatric: normal mood/affect, good insight/judgement      OBJECTIVE:     Vital Signs Range (Last 24H):  Temp:  [97.6 °F (36.4 °C)-98.2 °F (36.8 °C)]   Pulse:  [88-96]   Resp:  [16-18]   BP: (109-130)/(73-80)   SpO2:  [93 %-94 %]     Physical Exam:  General- Patient alert and oriented x3 in NAD  ENT- PERRLA,  Neck- No masses  CV- Regular rate and rhythm  Resp-  No increased WOB  GI- + tender/+ distended  Extrem- No cyanosis, clubbing, edema.   Derm- No rashes, masses, or lesions noted  Neuro-  No focal deficits noted.     Physical Exam  Body mass index is 24.37 kg/m².    Scheduled Meds:    ciprofloxacin  400 mg Intravenous Q12H    digoxin  0.125 mg Oral Daily    EScitalopram oxalate  20 mg Oral QHS    lamiVUDine  150 mg Oral QHS    LIDOcaine  1 patch Transdermal Q24H    metronidazole  500 mg Intravenous Q8H    sodium chloride 0.9%  10 mL Intravenous Q6H      Continuous Infusions:    lactated ringers   Intravenous Continuous 50 mL/hr at 07/01/24 2214 Rate Change at 07/01/24 2214     PRN Meds:  Current Facility-Administered Medications:     acetaminophen, 650 mg, Oral, Q8H PRN    dextrose 10%, 12.5 g, Intravenous, PRN    dextrose 10%, 25 g, Intravenous, PRN    glucagon (human recombinant), 1 mg, Intramuscular, PRN    glucose, 16 g, Oral, PRN    glucose, 24 g, Oral, PRN    insulin aspart U-100, 0-5 Units, Subcutaneous, QID (AC + HS) PRN    melatonin, 6 mg, Oral, Nightly PRN    naloxone, 0.02 mg, Intravenous, PRN    ondansetron, 4 mg, Intravenous, Q6H PRN    oxyCODONE, 10 mg, Oral, Q4H PRN    oxyCODONE, 20 mg, Oral, Q4H PRN    prochlorperazine, 5 mg, Intravenous, Q6H PRN    Flushing PICC/Midline Protocol, , , Until Discontinued **AND** sodium chloride 0.9%, 10 mL, Intravenous, Q6H **AND** sodium chloride 0.9%, 10 mL, Intravenous, PRN    Allergies:   Review of patient's allergies indicates:   Allergen Reactions    Enbrel [etanercept] Shortness Of Breath     CHF    Nsaids (non-steroidal anti-inflammatory drug) Other (See Comments)     hypertention    Other reaction(s): Other (See Comments)    HTN    Patient states he tolerates anti inflammatory eye drops    Statins-hmg-coa reductase inhibitors Other (See Comments)     Heart arrythemias    Other reaction(s): Other (See Comments)    Joint pain and cardiac arrythmias    Pcn [penicillins] Rash       Labs:  Recent Labs   Lab 07/02/24 0442   INR 1.5*       Recent Labs   Lab 07/02/24 0442   WBC 5.63   HGB 12.4*   HCT 35.5*   MCV 88         Recent Labs   Lab 06/30/24  0431 07/01/24  0459 07/02/24 0442   GLU 83   < > 77   *   < > 131*   K 3.6   < > 3.9      < > 101   CO2 22*   < > 24   BUN 6*   < > 5*   CREATININE 0.6   < > 0.6   CALCIUM 7.2*   < > 7.2*   MG 1.7  --  1.5*   ALT 89*   < > 87*   *   < > 126*   ALBUMIN 1.3*   < > 1.4*   BILITOT 3.6*   < > 3.7*   BILIDIR 3.0*  --   --     < > = values in  this interval not displayed.       Vitals (Most Recent):  Temp: 98.2 °F (36.8 °C) (07/02/24 0751)  Pulse: 88 (07/02/24 0751)  Resp: 18 (07/02/24 0822)  BP: 125/80 (07/02/24 0751)  SpO2: (!) 94 % (07/02/24 0900)    ASA: 3  Mallampati: 2    Consent obtained    ASSESSMENT/PLAN:     Paracentesis. Local anesthesia.    Active Hospital Problems    Diagnosis  POA    *Colitis [K52.9]  Yes    SVT (supraventricular tachycardia) [I47.10]  Yes    Chronic diastolic heart failure [I50.32]  Yes     Grade I diastolic dysfunction.  EF 55-60% per most recent echo performed 04/03/2024.  Followed by Dr. Dempsey, cardiology    -        Type 2 diabetes mellitus, with long-term current use of insulin [E11.9, Z79.4]  Not Applicable    NAFLD (nonalcoholic fatty liver disease) [K76.0]  Yes    Hepatitis B core antibody positive [R76.8]  Yes      Resolved Hospital Problems   No resolved problems to display.           Ventura Jordan MD

## 2024-07-02 NOTE — ASSESSMENT & PLAN NOTE
- Patient states he was exposed to hep B while working in [place] in the 1990s and is on prophylaxis with lamivudine   - Continue lamivudine daily

## 2024-07-03 LAB
ALBUMIN SERPL BCP-MCNC: 1.4 G/DL (ref 3.5–5.2)
ALP SERPL-CCNC: 470 U/L (ref 55–135)
ALT SERPL W/O P-5'-P-CCNC: 79 U/L (ref 10–44)
ANION GAP SERPL CALC-SCNC: 6 MMOL/L (ref 8–16)
AST SERPL-CCNC: 122 U/L (ref 10–40)
BACTERIA BLD CULT: NORMAL
BASOPHILS # BLD AUTO: 0.05 K/UL (ref 0–0.2)
BASOPHILS NFR BLD: 0.8 % (ref 0–1.9)
BILIRUB SERPL-MCNC: 3.7 MG/DL (ref 0.1–1)
BUN SERPL-MCNC: 4 MG/DL (ref 8–23)
CALCIUM SERPL-MCNC: 7.1 MG/DL (ref 8.7–10.5)
CHLORIDE SERPL-SCNC: 100 MMOL/L (ref 95–110)
CO2 SERPL-SCNC: 25 MMOL/L (ref 23–29)
CREAT SERPL-MCNC: 0.6 MG/DL (ref 0.5–1.4)
DIFFERENTIAL METHOD BLD: ABNORMAL
EOSINOPHIL # BLD AUTO: 0.1 K/UL (ref 0–0.5)
EOSINOPHIL NFR BLD: 1.6 % (ref 0–8)
ERYTHROCYTE [DISTWIDTH] IN BLOOD BY AUTOMATED COUNT: 15.9 % (ref 11.5–14.5)
EST. GFR  (NO RACE VARIABLE): >60 ML/MIN/1.73 M^2
FERRITIN SERPL-MCNC: 1547 NG/ML (ref 20–300)
GLUCOSE SERPL-MCNC: 79 MG/DL (ref 70–110)
HCT VFR BLD AUTO: 36.7 % (ref 40–54)
HGB BLD-MCNC: 12.6 G/DL (ref 14–18)
IMM GRANULOCYTES # BLD AUTO: 0.03 K/UL (ref 0–0.04)
IMM GRANULOCYTES NFR BLD AUTO: 0.5 % (ref 0–0.5)
IRON SERPL-MCNC: 72 UG/DL (ref 45–160)
LYMPHOCYTES # BLD AUTO: 1.3 K/UL (ref 1–4.8)
LYMPHOCYTES NFR BLD: 21.1 % (ref 18–48)
MAGNESIUM SERPL-MCNC: 1.6 MG/DL (ref 1.6–2.6)
MCH RBC QN AUTO: 30.2 PG (ref 27–31)
MCHC RBC AUTO-ENTMCNC: 34.3 G/DL (ref 32–36)
MCV RBC AUTO: 88 FL (ref 82–98)
MONOCYTES # BLD AUTO: 0.6 K/UL (ref 0.3–1)
MONOCYTES NFR BLD: 8.9 % (ref 4–15)
NEUTROPHILS # BLD AUTO: 4.2 K/UL (ref 1.8–7.7)
NEUTROPHILS NFR BLD: 67.1 % (ref 38–73)
NRBC BLD-RTO: 0 /100 WBC
PHOSPHATE SERPL-MCNC: 2.8 MG/DL (ref 2.7–4.5)
PLATELET # BLD AUTO: 225 K/UL (ref 150–450)
PMV BLD AUTO: 11.3 FL (ref 9.2–12.9)
POCT GLUCOSE: 76 MG/DL (ref 70–110)
POCT GLUCOSE: 80 MG/DL (ref 70–110)
POCT GLUCOSE: 80 MG/DL (ref 70–110)
POCT GLUCOSE: 81 MG/DL (ref 70–110)
POCT GLUCOSE: 89 MG/DL (ref 70–110)
POTASSIUM SERPL-SCNC: 3.8 MMOL/L (ref 3.5–5.1)
PROT SERPL-MCNC: 4.7 G/DL (ref 6–8.4)
RBC # BLD AUTO: 4.17 M/UL (ref 4.6–6.2)
SATURATED IRON: 84 % (ref 20–50)
SODIUM SERPL-SCNC: 131 MMOL/L (ref 136–145)
TOTAL IRON BINDING CAPACITY: 86 UG/DL (ref 250–450)
TRANSFERRIN SERPL-MCNC: 58 MG/DL (ref 200–375)
WBC # BLD AUTO: 6.27 K/UL (ref 3.9–12.7)

## 2024-07-03 PROCEDURE — 97162 PT EVAL MOD COMPLEX 30 MIN: CPT

## 2024-07-03 PROCEDURE — 25000003 PHARM REV CODE 250: Performed by: HOSPITALIST

## 2024-07-03 PROCEDURE — 86038 ANTINUCLEAR ANTIBODIES: CPT | Performed by: INTERNAL MEDICINE

## 2024-07-03 PROCEDURE — 11000001 HC ACUTE MED/SURG PRIVATE ROOM

## 2024-07-03 PROCEDURE — 25000003 PHARM REV CODE 250: Performed by: NURSE PRACTITIONER

## 2024-07-03 PROCEDURE — 36415 COLL VENOUS BLD VENIPUNCTURE: CPT | Performed by: INTERNAL MEDICINE

## 2024-07-03 PROCEDURE — 94761 N-INVAS EAR/PLS OXIMETRY MLT: CPT

## 2024-07-03 PROCEDURE — 86015 ACTIN ANTIBODY EACH: CPT | Performed by: INTERNAL MEDICINE

## 2024-07-03 PROCEDURE — 97535 SELF CARE MNGMENT TRAINING: CPT

## 2024-07-03 PROCEDURE — 82728 ASSAY OF FERRITIN: CPT | Performed by: INTERNAL MEDICINE

## 2024-07-03 PROCEDURE — 85025 COMPLETE CBC W/AUTO DIFF WBC: CPT | Performed by: HOSPITALIST

## 2024-07-03 PROCEDURE — 25000003 PHARM REV CODE 250: Performed by: INTERNAL MEDICINE

## 2024-07-03 PROCEDURE — 83735 ASSAY OF MAGNESIUM: CPT | Performed by: HOSPITALIST

## 2024-07-03 PROCEDURE — 63600175 PHARM REV CODE 636 W HCPCS: Performed by: NURSE PRACTITIONER

## 2024-07-03 PROCEDURE — 84100 ASSAY OF PHOSPHORUS: CPT | Performed by: HOSPITALIST

## 2024-07-03 PROCEDURE — A4216 STERILE WATER/SALINE, 10 ML: HCPCS | Performed by: HOSPITALIST

## 2024-07-03 PROCEDURE — 80053 COMPREHEN METABOLIC PANEL: CPT | Performed by: HOSPITALIST

## 2024-07-03 PROCEDURE — 97165 OT EVAL LOW COMPLEX 30 MIN: CPT

## 2024-07-03 PROCEDURE — 86381 MITOCHONDRIAL ANTIBODY EACH: CPT | Performed by: INTERNAL MEDICINE

## 2024-07-03 PROCEDURE — 83540 ASSAY OF IRON: CPT | Performed by: INTERNAL MEDICINE

## 2024-07-03 RX ORDER — OXYCODONE AND ACETAMINOPHEN 10; 325 MG/1; MG/1
1 TABLET ORAL EVERY 4 HOURS PRN
Status: DISCONTINUED | OUTPATIENT
Start: 2024-07-03 | End: 2024-07-14 | Stop reason: HOSPADM

## 2024-07-03 RX ORDER — HYDROXYZINE HYDROCHLORIDE 25 MG/1
50 TABLET, FILM COATED ORAL 4 TIMES DAILY PRN
Status: DISCONTINUED | OUTPATIENT
Start: 2024-07-03 | End: 2024-07-14 | Stop reason: HOSPADM

## 2024-07-03 RX ADMIN — ONDANSETRON 4 MG: 2 INJECTION INTRAMUSCULAR; INTRAVENOUS at 12:07

## 2024-07-03 RX ADMIN — SODIUM CHLORIDE, PRESERVATIVE FREE 10 ML: 5 INJECTION INTRAVENOUS at 05:07

## 2024-07-03 RX ADMIN — OXYCODONE HYDROCHLORIDE 10 MG: 10 TABLET ORAL at 07:07

## 2024-07-03 RX ADMIN — DIGOXIN 0.12 MG: 125 TABLET ORAL at 08:07

## 2024-07-03 RX ADMIN — Medication 10 ML: at 06:07

## 2024-07-03 RX ADMIN — SODIUM CHLORIDE, PRESERVATIVE FREE 10 ML: 5 INJECTION INTRAVENOUS at 10:07

## 2024-07-03 RX ADMIN — HYDROXYZINE HYDROCHLORIDE 50 MG: 25 TABLET ORAL at 07:07

## 2024-07-03 RX ADMIN — OXYCODONE HYDROCHLORIDE 20 MG: 10 TABLET ORAL at 05:07

## 2024-07-03 RX ADMIN — Medication 10 ML: at 12:07

## 2024-07-03 RX ADMIN — METRONIDAZOLE 500 MG: 5 INJECTION, SOLUTION INTRAVENOUS at 01:07

## 2024-07-03 RX ADMIN — OXYCODONE HYDROCHLORIDE 10 MG: 10 TABLET ORAL at 09:07

## 2024-07-03 RX ADMIN — ONDANSETRON 4 MG: 2 INJECTION INTRAMUSCULAR; INTRAVENOUS at 01:07

## 2024-07-03 RX ADMIN — OXYCODONE AND ACETAMINOPHEN 1 TABLET: 10; 325 TABLET ORAL at 11:07

## 2024-07-03 RX ADMIN — CIPROFLOXACIN 400 MG: 2 INJECTION, SOLUTION INTRAVENOUS at 05:07

## 2024-07-03 RX ADMIN — OXYCODONE HYDROCHLORIDE 10 MG: 10 TABLET ORAL at 01:07

## 2024-07-03 RX ADMIN — LAMIVUDINE 150 MG: 150 TABLET, FILM COATED ORAL at 09:07

## 2024-07-03 RX ADMIN — HYDROXYZINE HYDROCHLORIDE 50 MG: 25 TABLET ORAL at 01:07

## 2024-07-03 RX ADMIN — ESCITALOPRAM OXALATE 20 MG: 10 TABLET ORAL at 09:07

## 2024-07-03 RX ADMIN — METRONIDAZOLE 500 MG: 5 INJECTION, SOLUTION INTRAVENOUS at 09:07

## 2024-07-03 RX ADMIN — MELATONIN TAB 3 MG 6 MG: 3 TAB at 11:07

## 2024-07-03 RX ADMIN — METRONIDAZOLE 500 MG: 5 INJECTION, SOLUTION INTRAVENOUS at 06:07

## 2024-07-03 RX ADMIN — SODIUM CHLORIDE, PRESERVATIVE FREE 10 ML: 5 INJECTION INTRAVENOUS at 01:07

## 2024-07-03 RX ADMIN — OXYCODONE HYDROCHLORIDE 10 MG: 10 TABLET ORAL at 12:07

## 2024-07-03 RX ADMIN — PROCHLORPERAZINE EDISYLATE 5 MG: 5 INJECTION INTRAMUSCULAR; INTRAVENOUS at 04:07

## 2024-07-03 NOTE — PLAN OF CARE
POC reviewed. No significant events this shift. Complaints of pain, nausea and anxiety treated as per MAR.Pt voids and shifts in bed with 1 person assist.IVF cont as per order and received IV abx as per MAR. Patient still has very poor appetite.No injuries, falls, or trauma occurred during shift. Purposeful rounding completed. Bed low and locked with side rails up x 3 and call light within reach.   Problem: Adult Inpatient Plan of Care  Goal: Plan of Care Review  Outcome: Progressing  Goal: Patient-Specific Goal (Individualized)  Outcome: Progressing  Goal: Absence of Hospital-Acquired Illness or Injury  Outcome: Progressing  Goal: Optimal Comfort and Wellbeing  Outcome: Progressing  Goal: Readiness for Transition of Care  Outcome: Progressing     Problem: Diabetes Comorbidity  Goal: Blood Glucose Level Within Targeted Range  Outcome: Progressing     Problem: Wound  Goal: Optimal Coping  Outcome: Progressing  Goal: Optimal Functional Ability  Outcome: Progressing  Goal: Absence of Infection Signs and Symptoms  Outcome: Progressing  Goal: Improved Oral Intake  Outcome: Progressing  Goal: Optimal Pain Control and Function  Outcome: Progressing  Goal: Skin Health and Integrity  Outcome: Progressing  Goal: Optimal Wound Healing  Outcome: Progressing     Problem: Infection  Goal: Absence of Infection Signs and Symptoms  Outcome: Progressing     Problem: Skin Injury Risk Increased  Goal: Skin Health and Integrity  Outcome: Progressing     Problem: Nausea and Vomiting  Goal: Nausea and Vomiting Relief  Outcome: Progressing

## 2024-07-03 NOTE — PLAN OF CARE
07/03/24 1522   Discharge Reassessment   Assessment Type Discharge Planning Reassessment   Did the patient's condition or plan change since previous assessment? No   Discharge Plan discussed with: Patient   Communicated ALEXANDER with patient/caregiver Date not available/Unable to determine   Discharge Plan A Home with family   Discharge Plan B Home   DME Needed Upon Discharge  none   Transition of Care Barriers None   Why the patient remains in the hospital Requires continued medical care   Post-Acute Status   Discharge Delays None known at this time

## 2024-07-03 NOTE — SUBJECTIVE & OBJECTIVE
Interval History: No acute events overnight. Feeling okay this morning; pending paracentesis. Discussed plan of care. No other concerns at this time.    Review of Systems   Constitutional:  Negative for chills and fever.   Respiratory:  Negative for cough and shortness of breath.    Cardiovascular:  Negative for chest pain and palpitations.   Gastrointestinal:  Positive for abdominal pain and nausea. Negative for vomiting.     Objective:     Vital Signs (Most Recent):  Temp: 98.6 °F (37 °C) (07/02/24 1937)  Pulse: 94 (07/02/24 1937)  Resp: 16 (07/02/24 2052)  BP: 126/60 (07/02/24 1937)  SpO2: (!) 94 % (07/02/24 1937) Vital Signs (24h Range):  Temp:  [97.6 °F (36.4 °C)-98.8 °F (37.1 °C)] 98.6 °F (37 °C)  Pulse:  [83-97] 94  Resp:  [16-18] 16  SpO2:  [92 %-98 %] 94 %  BP: ()/(51-83) 126/60     Weight: 81.5 kg (179 lb 10.8 oz)  Body mass index is 24.37 kg/m².    Intake/Output Summary (Last 24 hours) at 7/2/2024 2225  Last data filed at 7/2/2024 2137  Gross per 24 hour   Intake 2346.81 ml   Output 1500 ml   Net 846.81 ml         Physical Exam  Vitals and nursing note reviewed.   Constitutional:       General: He is not in acute distress.     Appearance: He is well-developed.   HENT:      Head: Normocephalic and atraumatic.   Eyes:      General:         Right eye: No discharge.         Left eye: No discharge.      Conjunctiva/sclera: Conjunctivae normal.   Cardiovascular:      Rate and Rhythm: Normal rate.      Pulses: Normal pulses.   Pulmonary:      Effort: Pulmonary effort is normal. No respiratory distress.   Abdominal:      General: There is distension.      Palpations: Abdomen is soft.      Tenderness: There is abdominal tenderness.   Musculoskeletal:         General: Normal range of motion.      Right lower leg: Edema present.      Left lower leg: Edema present.   Skin:     General: Skin is warm and dry.   Neurological:      Mental Status: He is alert and oriented to person, place, and time.              Significant Labs:   CBC:  Recent Labs   Lab 06/30/24  0431 07/01/24 0459 07/02/24 0442   WBC 4.87 5.59 5.63   HGB 12.3* 12.9* 12.4*   HCT 35.9* 38.4* 35.5*    231 225   GRAN 65.7  3.2 62.7  3.5 60.9  3.4   LYMPH 23.2  1.1 25.9  1.5 26.1  1.5   MONO 8.2  0.4 8.2  0.5 9.2  0.5   EOS 0.1 0.1 0.1   BASO 0.02 0.05 0.05   CMP:  Recent Labs   Lab 06/30/24 0431 07/01/24 0459 07/02/24 0442   * 131* 131*   K 3.6 4.1 3.9    101 101   CO2 22* 24 24   BUN 6* 6* 5*   CREATININE 0.6 0.7 0.6   GLU 83 89 77   CALCIUM 7.2* 7.5* 7.2*   MG 1.7  --  1.5*   PHOS  --   --  2.9   ALKPHOS 419* 472* 457*   * 149* 126*   ALT 89* 98* 87*   BILITOT 3.6* 3.8* 3.7*   PROT 4.4* 4.9* 4.6*   ALBUMIN 1.3* 1.5* 1.4*   ANIONGAP 8 6* 6*      Significant Imaging: I have reviewed and interpreted all pertinent imaging results/findings within the past 24 hours.

## 2024-07-03 NOTE — PT/OT/SLP EVAL
"Occupational Therapy   Evaluation    Name: Nithin Wagner  MRN: [id number]  Admitting Diagnosis: Colitis  Recent Surgery: Procedure(s) (LRB):  PARACENTESIS, ABDOMINAL (N/A) 1 Day Post-Op    Recommendations:     Discharge Recommendations:  (TBD - pending further assessment of OOB activity, progress)  Discharge Equipment Recommendations:  none  Barriers to discharge:   (Current functional level)    Assessment:     Nithin Wagner is a 68 y.o. male with a medical diagnosis of Colitis.  He presents with the following performance deficits affecting function: weakness, impaired endurance, impaired self care skills, impaired functional mobility, gait instability, decreased coordination, decreased upper extremity function, decreased lower extremity function, decreased safety awareness, pain, decreased ROM, edema, impaired cardiopulmonary response to activity, impaired joint extensibility.      OT orders received, chart reviewed. Evaluation complete, POC/goals established. Pt completed chair t/f with CGA and RW (unable to tolerate sitting UIC for more than 5 minutes 2/2 8/10 back pain), LB dressing with Mod A, and posterior nini care in stance with RW and Setup/SBA. Pt limited by decreased endurance and back pain. OT will follow and progress as tolerated to ensure return to PLOF and safe discharge home.   DC rec: TBD (pending progress/further assessment of OOB activity)  DME rec: none at this time (pt has BSC, raised toilet seat with rails, TTB, shower chair, RW, w/c)    Rehab Prognosis: Good; patient would benefit from acute skilled OT services to address these deficits and reach maximum level of function.       Plan:     Patient to be seen 4 x/week to address the above listed problems via self-care/home management, therapeutic activities, therapeutic exercises  Plan of Care Expires: 08/02/24  Plan of Care Reviewed with: patient    Subjective     Chief Complaint: lower back pain   Patient/Family Comments/goals: "I feel like I " "just keep getting worse" re: functional level    Occupational Profile:  Living Environment: Pt lives with nephew in a 2SH. Pt has set up downstairs office as bedroom as pt with difficulty with stairs since lumbar fx/kyphoplastay. Pt has half-bath downstairs and has been taking bed baths with assist of HH aide. Upstairs bath has tub/shower. Pt has a tub bench, shower chair, and BSC>  Previous level of function: Pt was Ind prior to lumbar fx; had SNF stay , now at home with HH aide to assist with bathing as well as additional paid helper to assist with dressing, medication mgmt, meals; pt's nephew available to assist during the day (works at night). Pt currently has HH PT.  Roles and Routines: retired respiratory therapist  Equipment Used at Home: wheelchair, walker, rolling, bath bench, shower chair, bedside commode, raised toilet  Assistance upon Discharge: nephew, HH aide/paid helper    Pain/Comfort:  Pain Rating 1:  (Pt with chronic back pain, although comfortable at rest, increasing to 8/10 sitting up in chair for 5 min)    Patients cultural, spiritual, Judaism conflicts given the current situation: no    Objective:     Communicated with: EB Pimentel prior to session.  Patient found left sidelying with PICC line upon OT entry to room.    General Precautions: Standard, fall  Orthopedic Precautions:  (Spinal - recent lumbar fx/kyphoplasty)  Braces: N/A  Respiratory Status: Room air    Occupational Performance:    Bed Mobility:    L sidelying to sit with Mod I, rails/HOB partially elevated  Sit>supine, log roll with Mod I    Functional Mobility/Transfers:  Sit<>stand from bed with RW with SBA  Chair T/F with CGA; pt only able to tolerate sitting up in chair for ~5 minutes before needing to return to supine 2/2 back pain (8/10)  Functional Mobility: CGA ~3 ft bed<>chair with RW    Activities of Daily Living:  Feeding:  independence sitting up in chair  Lower Body Dressing: moderate assistance pt able to briefs pull " over hips in stance with RW and SBA; pt needing assist threading over B feet   Toileting: stand by assistance standing with RW to complete posterior nini care, pt's briefs slightly soiled (changed soiled briefs as noted above)    Cognitive/Visual Perceptual:  Cognitive/Psychosocial Skills:     -       Oriented to: Person, Place, Time, and Situation   -       Follows Commands/attention:Follows two-step commands  -       Communication: clear/fluent  -       Memory: No Deficits noted  -       Safety awareness/insight to disability: continue to assess   -       Mood/Affect/Coping skills/emotional control: Appropriate to situation and Cooperative    Physical Exam:  Dominant hand:    -       R  Upper Extremity Range of Motion:     -       Right Upper Extremity: Shoulder AROM limited to ~100*, all other WFL  -       Left Upper Extremity: Shoulder AROM limited to ~100*, all other WFL  Upper Extremity Strength:    -       Right Upper Extremity: Appears WFL during functional tasks, within available ROM, continue to assess  -       Left Upper Extremity: Appears WFL during functional tasks, within available ROM, continue to assess   Strength:    -       Right Upper Extremity: WFL  -       Left Upper Extremity: WFL  Fine Motor Coordination:    -       Intact  Left hand thumb/finger opposition skills and Right hand thumb/finger opposition skills; slight tremor noted in L hand but does not appear to affect functionally    AMPAC 6 Click ADL:  AMPAC Total Score: 17    Treatment & Education:  Education on role of OT, POC, discharge recs, safety with OOB activity, use of call light    Patient left supine with all lines intact, call button in reach, and RN notified    GOALS:   Multidisciplinary Problems       Occupational Therapy Goals          Problem: Occupational Therapy    Goal Priority Disciplines Outcome Interventions   Occupational Therapy Goal     OT, PT/OT Progressing    Description: 1. Commode T/F with RW with SPV  2.  Toileting with SPV  3. Two G/H tasks standing at sink with RW and SPV  4. UB dressing with Setup  5. LB dressing adhering to spinal precautions with Setup/SPV, AE PRN                       History:     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    forehead    Stroke     TIA         Past Surgical History:   Procedure Laterality Date    CATARACT EXTRACTION W/  INTRAOCULAR LENS IMPLANT Right 3/28/2024    Procedure: EXTRACTION, CATARACT, WITH IOL INSERTION;  Surgeon: Hans Woodward MD;  Location: [facility] OR;  Service: Ophthalmology;  Laterality: Right;    CORONARY ANGIOGRAPHY N/A 3/10/2021    Procedure: ANGIOGRAM, CORONARY ARTERY - right radial;  Surgeon: Shemar Dempsey MD;  Location: [facility] CATH LAB;  Service: Cardiology;  Laterality: N/A;    CORONARY STENT PLACEMENT  03/10/2021    prox-mid RCA Marshal 4.5 x 26 mm, 4.5 x 12 mm    FIXATION KYPHOPLASTY N/A 4/22/2024    Procedure: KYPHOPLASTY;  Surgeon: Stefan Stern MD;  Location: [facility] OR;  Service: Pain Management;  Laterality: N/A;  NO PROPOFOL    INCISION AND DRAINAGE OF SCROTUM N/A 11/15/2022    Procedure: INCISION AND DRAINAGE, SCROTUM;  Surgeon: William Hatch MD;  Location: [facility] OR;  Service: Urology;  Laterality: N/A;    INCISION AND DRAINAGE OF SCROTUM N/A 11/17/2022    Procedure: INCISION AND DRAINAGE, SCROTUM;  Surgeon: William Hatch MD;  Location: [facility] OR;  Service: Urology;  Laterality: N/A;    LUNG LOBECTOMY Right 2008    RUL lobectomy after removal of atrial myxoma    MAGNETIC RESONANCE IMAGING N/A 4/30/2024    Procedure: MRI (Magnetic Resonance Imagine);  Surgeon:  Surgeon, Peace;  Location: [facility];  Service: Anesthesiology;  Laterality: N/A;    PLEURA BIOPSY      RESECTION OF ATRIAL MYXOMA  2007       Time Tracking:     OT Date of Treatment: 07/03/24  OT Start Time: 1143  OT Stop Time: 1215  OT Total Time (min): 32 min    Billable Minutes:Evaluation 20  Self Care/Home Management 12    7/3/2024

## 2024-07-03 NOTE — PLAN OF CARE
Problem: Occupational Therapy  Goal: Occupational Therapy Goal  Description: 1. Toilet T/F with RW with Mod I  2. Toileting with Mod I  3. Two G/H tasks standing at sink with RW and Mod I  4. UB dressing with Setup  5. LB dressing adhering to spinal precautions with Setup, AE PRN  Outcome: Progressing     OT orders received, chart reviewed. Evaluation complete, POC/goals established. Pt completed chair t/f with CGA and RW (unable to tolerate sitting UIC for more than 5 minutes 2/2 8/10 back pain), LB dressing with Mod A, and posterior nini care in stance with RW and Setup/SBA. Pt limited by decreased endurance and back pain. OT will follow and progress as tolerated to ensure return to PLOF and safe discharge home.   DC rec: TBD (pending progress/further assessment of OOB activity)  DME rec: none at this time (pt has BSC, raised toilet seat with rails, TTB, shower chair, RW, w/c)

## 2024-07-03 NOTE — ASSESSMENT & PLAN NOTE
Abnormal LFTs  Hypokalemia  - Patient with intermittent nausea, vomiting and diarrhea.  Reports subjective fever.  CT abdomen with findings concerning for colitis.   - Continue empiric Cipro and Flagyl  - Start IV fluid with lactated Ringer's at 75 cc/hr given patient appears to be volume contracted  - Appreciate GI input, further workup with MRCP was delayed  - Liver function tests with some improvement, cholestatic pattern, but with noted increase today  - Patient with some clinical improvement in symptoms and stool studies negative for C.diff  - MRCP was negative for obstruction, noted gallbladder distention and large amount of ascites  - Case discussed with GI; paracentesis today. Labs ordered. Hepatitis B studies still pending  - Continue trend with repeat laboratory testing and correct electrolytes as needed

## 2024-07-03 NOTE — PLAN OF CARE
Problem: Physical Therapy  Goal: Physical Therapy Goal  Description: Goals to be met by: 24    Patient will increase functional independence with mobility by performin. Sit<>stand with supervision with RW.  2. Gait x 50 feet with supervision with RW.  3. Ascend/descend 4 step(s) with least restrictive assistive device and uni HR with Shani.   4. Tolerate sitting up in chair x30 min with minimal increase in back pain.  Outcome: Progressing     Patient evaluated by PT and goals established. Patient with continued weakness and suspect hypotension with standing activity limiting his endurance to less than 1 min standing or 20 ft ambulation - pt endorses decreased endurance at his recent baseline since kyphoplasty but this is worse than normal. Encouraged more sitting at least EOB to stabilize BP as pt with preference for lying with HOB flat 2/2 back pain. PT will continue to follow and progress as tolerated. Rec for dc to TBD, anticipate LIT when able to ambulate limited household distances without pre-syncope symptoms.. Please see progress note for detailed plan of care and recommendations.

## 2024-07-03 NOTE — PT/OT/SLP EVAL
Physical Therapy Evaluation    Patient Name:  Nithin Wagner   MRN:  [id number]    Recommendations:     Discharge Recommendations:  (TBD - anticipate LIT)   Discharge Equipment Recommendations: none   Barriers to discharge: Inaccessible home and Decreased caregiver support    Assessment:     Nithin Wagner is a 68 y.o. male admitted with a medical diagnosis of Colitis - admitted after multiple days with abd pain and N/V/D with dehydration. Hospital course significant for paracentesis 7/2. Pmhx pertient for HTN. HF, DM, RA, T11 and L3 kyphoplasty 4/22 with subsequent post acute placement for PT/OT 2/2 intractable pain and continued decreased mobility since transition home. He presents with the following impairments/functional limitations: weakness, impaired endurance, impaired self care skills, impaired functional mobility, gait instability, decreased coordination, decreased upper extremity function, decreased lower extremity function, decreased safety awareness, pain, decreased ROM, edema, impaired cardiopulmonary response to activity, impaired joint extensibility, impaired muscle length.    Patient evaluated by PT and goals established. Patient with continued weakness and suspect hypotension with standing activity limiting his endurance to less than 1 min standing or 20 ft ambulation - pt endorses decreased endurance at his recent baseline since kyphoplasty but this is worse than normal. Encouraged more sitting at least EOB to stabilize BP as pt with preference for lying with HOB flat 2/2 back pain. PT will continue to follow and progress as tolerated. Rec for dc to TBD, anticipate LIT when able to ambulate limited household distances without pre-syncope symptoms.     Rehab Prognosis: Fair; patient would benefit from acute skilled PT services to address these deficits and reach maximum level of function.    Recent Surgery: Procedure(s) (LRB):  PARACENTESIS, ABDOMINAL (N/A) 1 Day Post-Op    Plan:     During this  "hospitalization, patient to be seen 5 x/week to address the identified rehab impairments via gait training, therapeutic activities, therapeutic exercises, neuromuscular re-education and progress toward the following goals:    Plan of Care Expires:  07/17/24    Subjective     Chief Complaint: Pain in back with majority of mobility, feels like he's "fading" (pre syncope) towards end of short gait bout  Patient/Family Comments/goals: Goal to get stronger and to have less pain; Patient agreeable to evaluation.  Pain/Comfort:  Pain Rating 1:  (chronic back pain)  Pain Rating Post-Intervention 1:  (back pain worsened with hamstring strtch and standing/sitting in chair)    Patients cultural, spiritual, Anabaptist conflicts given the current situation: no    Living Environment:  Pt lives with his nephew who works at night in a 2 story home with 5-6 steps to enter and handrail(s).   Since April has been staying on ground floor  Upon discharge, patient will have assistance from his nephew when available, has been having HH PT.  Prior level of function:  Ambulation: Mod(I) with RW for short household distances, uses WC for longer distances  Has been limited in gait distance since kyphoplasty 2/2 pain and weakness  Occasionally requires assistance from nephew for bed mobility  Falls: Reports 1 fall since being home from SNF/rehab (reports missed EOB)  Equipment used at home: walker, rolling, wheelchair.     Objective:     Communicated with BE Pimentel prior to session.  Patient found left sidelying with PICC line  upon PT entry to room.    General Precautions: Standard, fall  Orthopedic Precautions:N/A   Braces: N/A  Respiratory Status: Room air    Exams:  Cognitive Exam:  Patient is oriented to Person, Place, Time, and Situation  Gross Motor Coordination:  WFL  Postural Exam:  Patient presented with the following abnormalities:    -       Rounded shoulders  -       Forward head  -       Posterior pelvic tilt  -       Abnormal " trunk flexion  -       Kyphosis  -       reversed curve of L spine, unable to correct 2/2 pain  Skin Integrity/Edema:      -       Skin integrity: darkened skin of BLE  -       Edema: Pitting B feet 2+  RLE ROM: WFL  RLE Strength: Deficits: 2/5 knee extension and hip flexion (pain at back/sciatic nerve), 3/5 DF  LLE ROM: WFL  LLE Strength: Deficits: 2/5 knee extension and hip flexion (pain at back/sciatic nerve), 3/5 DF    Functional Mobility:  Bed Mobility:     Supine to Sit: modified independence  Sit to Supine: modified independence  Transfers:     Sit to Stand:  minimum assistance and moderate assistance with rolling walker  Shani from EOB, modA from low recliner  Cueing for hand placement and need to get closer to chair before initiating sitting zacarias considering situation of recent fall being when sitting and missing EOB  Gait: 2x12 ft with RW and CGA.   Slow gait with decreased gilberto on walk to chair with c/o pre-syncope symptoms after ~45 sec standing.   On return to bed, increased gilberto and step lengths with quicker gait bout and no symptoms.   Tendency to leave RW perpendicular to sitting surface and twisting to sit down.   When encouraged to ambulate longer distances, pt adamant he cannot walk further      AM-PAC 6 CLICK MOBILITY  Total Score:19       Treatment & Education:  Discussion positioning in bed and use of pillow when sidelying to reduce stretch to hip/sciatic nerve  Encouraged sitting EOB for meal to increase tolerance and endurance for OOB mobility  PT educated patient re:   PT plan of care/role of PT  Safety with OOB mobility  Use of RW, HB features  Discharge disposition    Pt  verbalized understanding       Patient left left sidelying with all lines intact, call button in reach, and MD notified.    GOALS:   Multidisciplinary Problems       Physical Therapy Goals          Problem: Physical Therapy    Goal Priority Disciplines Outcome Goal Variances Interventions   Physical Therapy Goal      PT, PT/OT Progressing     Description: Goals to be met by: 24    Patient will increase functional independence with mobility by performin. Sit<>stand with supervision with RW.  2. Gait x 50 feet with supervision with RW.  3. Ascend/descend 4 step(s) with least restrictive assistive device and uni HR with Shani.   4. Tolerate sitting up in chair x30 min with minimal increase in back pain.                       History:     Past Medical History:   Diagnosis Date    Arthritis     Atrial myxoma     Coronary atherosclerosis 2020    stents x 3    Diabetes mellitus, type 2     Difficult intubation     Heart failure     Hepatitis B     HPV (human papilloma virus) infection     Hyperlipidemia     Hypertension     Non-alcoholic fatty liver disease     Rheumatoid arthritis     Rheumatoid arthritis flare 2021    Squamous cell carcinoma of forehead 10/26/2023    forehead    Stroke     TIA       Past Surgical History:   Procedure Laterality Date    CATARACT EXTRACTION W/  INTRAOCULAR LENS IMPLANT Right 3/28/2024    Procedure: EXTRACTION, CATARACT, WITH IOL INSERTION;  Surgeon: Hans Woodward MD;  Location: [facility] OR;  Service: Ophthalmology;  Laterality: Right;    CORONARY ANGIOGRAPHY N/A 3/10/2021    Procedure: ANGIOGRAM, CORONARY ARTERY - right radial;  Surgeon: Shemar Dempsey MD;  Location: [facility] CATH LAB;  Service: Cardiology;  Laterality: N/A;    CORONARY STENT PLACEMENT  03/10/2021    prox-mid RCA Marshal 4.5 x 26 mm, 4.5 x 12 mm    FIXATION KYPHOPLASTY N/A 2024    Procedure: KYPHOPLASTY;  Surgeon: Stefan Stern MD;  Location: [facility] OR;  Service: Pain Management;  Laterality: N/A;  NO PROPOFOL    INCISION AND DRAINAGE OF SCROTUM N/A 11/15/2022    Procedure: INCISION AND DRAINAGE, SCROTUM;  Surgeon: William Hatch MD;  Location: [facility];  Service: Urology;  Laterality: N/A;    INCISION AND DRAINAGE OF SCROTUM N/A 2022    Procedure: INCISION AND DRAINAGE, SCROTUM;  Surgeon: William Hatch MD;   Location: [facility];  Service: Urology;  Laterality: N/A;    LUNG LOBECTOMY Right 2008    RUL lobectomy after removal of atrial myxoma    MAGNETIC RESONANCE IMAGING N/A 4/30/2024    Procedure: MRI (Magnetic Resonance Imagine);  Surgeon: Peace Watts;  Location: [facility];  Service: Anesthesiology;  Laterality: N/A;    PLEURA BIOPSY      RESECTION OF ATRIAL MYXOMA  2007       Time Tracking:     PT Received On: 07/03/24  PT Start Time: 1640     PT Stop Time: 1655  PT Total Time (min): 15 min     Billable Minutes: Evaluation 15      07/03/2024

## 2024-07-03 NOTE — PROGRESS NOTES
Gastroenterology Progress Note    Reason for Consult: N/V, abnormal LFTs, abdominal pain, diarrhea    Subjective:  No abdominal pain, no new complaints    ROS:  Gen: no F/C  CV: no CP/palpitations  Resp: no SOB/wheezing    Physical Exam  /74 (BP Location: Right arm, Patient Position: Lying)   Pulse 93   Temp 97.7 °F (36.5 °C) (Oral)   Resp 16   Ht 6' (1.829 m)   Wt 81.5 kg (179 lb 10.8 oz)   SpO2 (!) 93%   BMI 24.37 kg/m²   General: Awake, alert male in no apparent distress  HEENT: NC/AT, PERRL, EOMI, MMM  CV: RRR, without murmur, gallop, or rubs  Pulm: CTAB, no wheezing, rales, or rhonchi  GI: soft, TTP in RUQ and epigastric region, mildly distended, +BS  MSK: no edema and normal ROM  Psych: normal mood and affect    Medications/Infusions:  Current Facility-Administered Medications   Medication Dose Route Frequency Provider Last Rate Last Admin    acetaminophen tablet 650 mg  650 mg Oral Q8H PRN Valeria Patel, BASIA        ciprofloxacin (CIPRO)400mg/200ml D5W IVPB 400 mg  400 mg Intravenous Q12H Valeria Patel NP   Stopped at 07/03/24 0606    dextrose 10% bolus 125 mL 125 mL  12.5 g Intravenous PRN Valeria Patel NP        dextrose 10% bolus 250 mL 250 mL  25 g Intravenous PRN Valeria Patel NP        digoxin tablet 0.125 mg  0.125 mg Oral Daily Valeria Patel NP   0.125 mg at 07/02/24 0822    EScitalopram oxalate tablet 20 mg  20 mg Oral QHS Valeria Patel NP   20 mg at 07/02/24 2052    glucagon (human recombinant) injection 1 mg  1 mg Intramuscular PRN Valeria Patel, NP        glucose chewable tablet 16 g  16 g Oral PRN Valeria Patel, NP        glucose chewable tablet 24 g  24 g Oral PRN Valeria Patel, NP        insulin aspart U-100 pen 0-5 Units  0-5 Units Subcutaneous QID (AC + HS) PRN Valeria Patel NP        lactated ringers infusion   Intravenous Continuous Darby Obrien MD 50 mL/hr at 07/02/24 2051 New Bag at 07/02/24 2051    lamiVUDine tablet 150  mg  150 mg Oral QHS Valeria Patel, NP   150 mg at 07/02/24 2052    LIDOcaine 5 % patch 1 patch  1 patch Transdermal Q24H Valeria Patel, NP   1 patch at 06/28/24 2203    melatonin tablet 6 mg  6 mg Oral Nightly PRN Valeria Patel, NP   6 mg at 06/29/24 2339    metronidazole IVPB 500 mg  500 mg Intravenous Q8H Valeria Patel, NP   Stopped at 07/03/24 0706    naloxone 0.4 mg/mL injection 0.02 mg  0.02 mg Intravenous PRN Valeria Patel, BASIA        ondansetron injection 4 mg  4 mg Intravenous Q6H PRN Valeria Patel, NP   4 mg at 07/03/24 0056    oxyCODONE immediate release tablet Tab 10 mg  10 mg Oral Q4H PRN Darby Obrien MD   10 mg at 07/03/24 0056    oxyCODONE immediate release tablet Tab 20 mg  20 mg Oral Q4H PRN Darby Obrien MD   20 mg at 07/03/24 0511    prochlorperazine injection Soln 5 mg  5 mg Intravenous Q6H PRN Valeria Patel, NP   5 mg at 07/03/24 0458    sodium chloride 0.9% flush 10 mL  10 mL Intravenous Q6H Darby Obrien MD   10 mL at 07/03/24 0500    And    sodium chloride 0.9% flush 10 mL  10 mL Intravenous PRN Darby Obrien MD   10 mL at 07/03/24 0606       Intake and Output:    Intake/Output Summary (Last 24 hours) at 7/3/2024 0753  Last data filed at 7/3/2024 0706  Gross per 24 hour   Intake 3100.29 ml   Output 1500 ml   Net 1600.29 ml       Labs:  Lab Results   Component Value Date/Time    WBC 6.27 07/03/2024 05:07 AM    HGB 12.6 (L) 07/03/2024 05:07 AM    HCT 36.7 (L) 07/03/2024 05:07 AM    HCT 52 04/26/2024 05:18 PM     07/03/2024 05:07 AM    MCV 88 07/03/2024 05:07 AM     (L) 07/03/2024 05:07 AM    K 3.8 07/03/2024 05:07 AM     07/03/2024 05:07 AM    CO2 25 07/03/2024 05:07 AM    CO2 11 (H) 11/04/2021 04:34 PM    BUN 4 (L) 07/03/2024 05:07 AM    CREATININE 0.6 07/03/2024 05:07 AM    GLU 79 07/03/2024 05:07 AM    CALCIUM 7.1 (L) 07/03/2024 05:07 AM    MG 1.6 07/03/2024 05:07 AM    PHOS 2.8 07/03/2024 05:07 AM    INR 1.5 (H) 07/02/2024 04:42 AM     APTT 28.8 06/28/2024 10:31 PM   ]  Lab Results   Component Value Date/Time    PROT 4.7 (L) 07/03/2024 05:07 AM    ALBUMIN 1.4 (L) 07/03/2024 05:07 AM    ALBUMIN 4.1 06/21/2022 07:23 AM    BILITOT 3.7 (H) 07/03/2024 05:07 AM    BILIDIR 3.0 (H) 06/30/2024 04:31 AM     (H) 07/03/2024 05:07 AM    ALT 79 (H) 07/03/2024 05:07 AM    ALKPHOS 470 (H) 07/03/2024 05:07 AM   ]    Imaging and Procedures:  Labs and imaging results were reviewed.  RUQ U/S 6/28/24:  Distended gallbladder. No gallstones or ultrasonographic evidence to suggest acute cholecystitis.     CT A/P 6/27/24:  Diffuse colitis, of numerous possible etiologies.  Correlate clinically.  Gastroduodenitis, likely with some proximal jejunal enteritis.  Small quantity of intraperitoneal free fluid.  No pneumoperitoneum.  Bilateral nonobstructing intrarenal calculi.  Profound diffuse hepatic steatosis.  Correlate clinically.  Consider outpatient referral to a hepatology/gastroenterology.  Bibasilar pulmonary opacities similar to 06/02/2024, possibly representing subsegmental atelectasis and scarring.  Infectious, inflammatory, or neoplastic process is not excluded.  Correlate clinically, and with follow-up outpatient chest CT within 3-6 months.    Assessment:  Nithin Wagner is a 68 y.o. male with a history of HTN, DM2, HLD, Hep B - cleared (Surface Ag neg), RA, NAFLD, prior CVA, CAD s/p PCI and diastolic CHF w/ atrial myxoma who presented with weakness, N/V, diarrhea and dehydration.  GI consulted for abnormal LFTs.   Imaging revealed ascites.  Paracentesis was done today.  Albumin was not performed, so the SAAG could not be calculated.  However, it is most likely transudative.  No evidence of SBP.  Question of cirrhosis.  We will check autoimmune serology.    Plan:  - await autoimmune serology  - monitor LFTs  - follow-up with GI after discharge

## 2024-07-04 PROBLEM — E43 SEVERE PROTEIN-CALORIE MALNUTRITION: Status: ACTIVE | Noted: 2024-07-04

## 2024-07-04 LAB
ALBUMIN SERPL BCP-MCNC: 1.3 G/DL (ref 3.5–5.2)
ALP SERPL-CCNC: 398 U/L (ref 55–135)
ALT SERPL W/O P-5'-P-CCNC: 66 U/L (ref 10–44)
ANION GAP SERPL CALC-SCNC: 4 MMOL/L (ref 8–16)
AST SERPL-CCNC: 95 U/L (ref 10–40)
BASOPHILS # BLD AUTO: 0.05 K/UL (ref 0–0.2)
BASOPHILS NFR BLD: 0.9 % (ref 0–1.9)
BILIRUB SERPL-MCNC: 3.3 MG/DL (ref 0.1–1)
BUN SERPL-MCNC: 4 MG/DL (ref 8–23)
CALCIUM SERPL-MCNC: 7 MG/DL (ref 8.7–10.5)
CHLORIDE SERPL-SCNC: 101 MMOL/L (ref 95–110)
CO2 SERPL-SCNC: 24 MMOL/L (ref 23–29)
CREAT SERPL-MCNC: 0.6 MG/DL (ref 0.5–1.4)
DIFFERENTIAL METHOD BLD: ABNORMAL
EOSINOPHIL # BLD AUTO: 0.1 K/UL (ref 0–0.5)
EOSINOPHIL NFR BLD: 2 % (ref 0–8)
ERYTHROCYTE [DISTWIDTH] IN BLOOD BY AUTOMATED COUNT: 15.9 % (ref 11.5–14.5)
EST. GFR  (NO RACE VARIABLE): >60 ML/MIN/1.73 M^2
GLUCOSE SERPL-MCNC: 95 MG/DL (ref 70–110)
HCT VFR BLD AUTO: 33.9 % (ref 40–54)
HGB BLD-MCNC: 11.6 G/DL (ref 14–18)
IMM GRANULOCYTES # BLD AUTO: 0.03 K/UL (ref 0–0.04)
IMM GRANULOCYTES NFR BLD AUTO: 0.5 % (ref 0–0.5)
LYMPHOCYTES # BLD AUTO: 1.3 K/UL (ref 1–4.8)
LYMPHOCYTES NFR BLD: 24.2 % (ref 18–48)
MAGNESIUM SERPL-MCNC: 1.7 MG/DL (ref 1.6–2.6)
MCH RBC QN AUTO: 30.3 PG (ref 27–31)
MCHC RBC AUTO-ENTMCNC: 34.2 G/DL (ref 32–36)
MCV RBC AUTO: 89 FL (ref 82–98)
MONOCYTES # BLD AUTO: 0.5 K/UL (ref 0.3–1)
MONOCYTES NFR BLD: 9.3 % (ref 4–15)
NEUTROPHILS # BLD AUTO: 3.5 K/UL (ref 1.8–7.7)
NEUTROPHILS NFR BLD: 63.1 % (ref 38–73)
NRBC BLD-RTO: 0 /100 WBC
PHOSPHATE SERPL-MCNC: 2.6 MG/DL (ref 2.7–4.5)
PLATELET # BLD AUTO: 189 K/UL (ref 150–450)
PMV BLD AUTO: 10.7 FL (ref 9.2–12.9)
POCT GLUCOSE: 79 MG/DL (ref 70–110)
POCT GLUCOSE: 79 MG/DL (ref 70–110)
POCT GLUCOSE: 84 MG/DL (ref 70–110)
POCT GLUCOSE: 85 MG/DL (ref 70–110)
POTASSIUM SERPL-SCNC: 3.7 MMOL/L (ref 3.5–5.1)
PROT SERPL-MCNC: 4.1 G/DL (ref 6–8.4)
RBC # BLD AUTO: 3.83 M/UL (ref 4.6–6.2)
SODIUM SERPL-SCNC: 129 MMOL/L (ref 136–145)
WBC # BLD AUTO: 5.49 K/UL (ref 3.9–12.7)

## 2024-07-04 PROCEDURE — 94761 N-INVAS EAR/PLS OXIMETRY MLT: CPT

## 2024-07-04 PROCEDURE — 36415 COLL VENOUS BLD VENIPUNCTURE: CPT | Performed by: HOSPITALIST

## 2024-07-04 PROCEDURE — 80053 COMPREHEN METABOLIC PANEL: CPT | Performed by: HOSPITALIST

## 2024-07-04 PROCEDURE — A4216 STERILE WATER/SALINE, 10 ML: HCPCS | Performed by: HOSPITALIST

## 2024-07-04 PROCEDURE — 97530 THERAPEUTIC ACTIVITIES: CPT | Mod: CQ

## 2024-07-04 PROCEDURE — 27000221 HC OXYGEN, UP TO 24 HOURS

## 2024-07-04 PROCEDURE — 25000003 PHARM REV CODE 250: Performed by: HOSPITALIST

## 2024-07-04 PROCEDURE — 25000003 PHARM REV CODE 250: Performed by: NURSE PRACTITIONER

## 2024-07-04 PROCEDURE — 99900035 HC TECH TIME PER 15 MIN (STAT)

## 2024-07-04 PROCEDURE — 25000003 PHARM REV CODE 250: Performed by: INTERNAL MEDICINE

## 2024-07-04 PROCEDURE — 25000003 PHARM REV CODE 250: Performed by: PHYSICIAN ASSISTANT

## 2024-07-04 PROCEDURE — 84100 ASSAY OF PHOSPHORUS: CPT | Performed by: HOSPITALIST

## 2024-07-04 PROCEDURE — 83735 ASSAY OF MAGNESIUM: CPT | Performed by: HOSPITALIST

## 2024-07-04 PROCEDURE — 85025 COMPLETE CBC W/AUTO DIFF WBC: CPT | Performed by: HOSPITALIST

## 2024-07-04 PROCEDURE — 63600175 PHARM REV CODE 636 W HCPCS: Performed by: INTERNAL MEDICINE

## 2024-07-04 PROCEDURE — 11000001 HC ACUTE MED/SURG PRIVATE ROOM

## 2024-07-04 PROCEDURE — 63600175 PHARM REV CODE 636 W HCPCS: Performed by: NURSE PRACTITIONER

## 2024-07-04 RX ORDER — PROCHLORPERAZINE EDISYLATE 5 MG/ML
5 INJECTION INTRAMUSCULAR; INTRAVENOUS EVERY 6 HOURS PRN
Status: DISCONTINUED | OUTPATIENT
Start: 2024-07-04 | End: 2024-07-14 | Stop reason: HOSPADM

## 2024-07-04 RX ORDER — LOPERAMIDE HYDROCHLORIDE 2 MG/1
2 CAPSULE ORAL ONCE
Status: DISCONTINUED | OUTPATIENT
Start: 2024-07-04 | End: 2024-07-04

## 2024-07-04 RX ORDER — SODIUM,POTASSIUM PHOSPHATES 280-250MG
2 POWDER IN PACKET (EA) ORAL EVERY 4 HOURS
Status: COMPLETED | OUTPATIENT
Start: 2024-07-04 | End: 2024-07-04

## 2024-07-04 RX ORDER — LOPERAMIDE HYDROCHLORIDE 2 MG/1
2 CAPSULE ORAL 4 TIMES DAILY PRN
Status: DISCONTINUED | OUTPATIENT
Start: 2024-07-04 | End: 2024-07-05

## 2024-07-04 RX ORDER — L. ACIDOPHILUS/L.BULGARICUS 100MM CELL
1 GRANULES IN PACKET (EA) ORAL
Status: DISCONTINUED | OUTPATIENT
Start: 2024-07-04 | End: 2024-07-14 | Stop reason: HOSPADM

## 2024-07-04 RX ORDER — LOPERAMIDE HYDROCHLORIDE 2 MG/1
2 CAPSULE ORAL ONCE
Status: COMPLETED | OUTPATIENT
Start: 2024-07-04 | End: 2024-07-04

## 2024-07-04 RX ORDER — ONDANSETRON HYDROCHLORIDE 2 MG/ML
4 INJECTION, SOLUTION INTRAVENOUS EVERY 6 HOURS
Status: DISCONTINUED | OUTPATIENT
Start: 2024-07-04 | End: 2024-07-14 | Stop reason: HOSPADM

## 2024-07-04 RX ADMIN — SODIUM CHLORIDE, PRESERVATIVE FREE 10 ML: 5 INJECTION INTRAVENOUS at 01:07

## 2024-07-04 RX ADMIN — LOPERAMIDE HYDROCHLORIDE 2 MG: 2 CAPSULE ORAL at 04:07

## 2024-07-04 RX ADMIN — LOPERAMIDE HYDROCHLORIDE 2 MG: 2 CAPSULE ORAL at 06:07

## 2024-07-04 RX ADMIN — POTASSIUM & SODIUM PHOSPHATES POWDER PACK 280-160-250 MG 2 PACKET: 280-160-250 PACK at 08:07

## 2024-07-04 RX ADMIN — METRONIDAZOLE 500 MG: 5 INJECTION, SOLUTION INTRAVENOUS at 09:07

## 2024-07-04 RX ADMIN — DIGOXIN 0.12 MG: 125 TABLET ORAL at 08:07

## 2024-07-04 RX ADMIN — ONDANSETRON 4 MG: 2 INJECTION INTRAMUSCULAR; INTRAVENOUS at 05:07

## 2024-07-04 RX ADMIN — CIPROFLOXACIN 400 MG: 2 INJECTION, SOLUTION INTRAVENOUS at 04:07

## 2024-07-04 RX ADMIN — LOPERAMIDE HYDROCHLORIDE 2 MG: 2 CAPSULE ORAL at 02:07

## 2024-07-04 RX ADMIN — Medication 1 EACH: at 05:07

## 2024-07-04 RX ADMIN — OXYCODONE AND ACETAMINOPHEN 1 TABLET: 10; 325 TABLET ORAL at 08:07

## 2024-07-04 RX ADMIN — SODIUM CHLORIDE, PRESERVATIVE FREE 10 ML: 5 INJECTION INTRAVENOUS at 05:07

## 2024-07-04 RX ADMIN — METRONIDAZOLE 500 MG: 5 INJECTION, SOLUTION INTRAVENOUS at 01:07

## 2024-07-04 RX ADMIN — CIPROFLOXACIN 400 MG: 2 INJECTION, SOLUTION INTRAVENOUS at 05:07

## 2024-07-04 RX ADMIN — HYDROXYZINE HYDROCHLORIDE 50 MG: 25 TABLET ORAL at 08:07

## 2024-07-04 RX ADMIN — METRONIDAZOLE 500 MG: 5 INJECTION, SOLUTION INTRAVENOUS at 05:07

## 2024-07-04 RX ADMIN — ONDANSETRON 4 MG: 2 INJECTION INTRAMUSCULAR; INTRAVENOUS at 01:07

## 2024-07-04 RX ADMIN — LAMIVUDINE 150 MG: 150 TABLET, FILM COATED ORAL at 08:07

## 2024-07-04 RX ADMIN — POTASSIUM & SODIUM PHOSPHATES POWDER PACK 280-160-250 MG 2 PACKET: 280-160-250 PACK at 09:07

## 2024-07-04 RX ADMIN — Medication 1 EACH: at 01:07

## 2024-07-04 RX ADMIN — HYDROXYZINE HYDROCHLORIDE 50 MG: 25 TABLET ORAL at 04:07

## 2024-07-04 RX ADMIN — ESCITALOPRAM OXALATE 20 MG: 10 TABLET ORAL at 08:07

## 2024-07-04 RX ADMIN — OXYCODONE AND ACETAMINOPHEN 1 TABLET: 10; 325 TABLET ORAL at 04:07

## 2024-07-04 NOTE — PROGRESS NOTES
[facility] Medicine  Progress Note    Patient Name: Nithin Wagner  MRN: [id number]  Patient Class: IP- Inpatient   Admission Date: 6/28/2024  Length of Stay: 5 days  Attending Physician: CARISA Lozano MD  Primary Care Provider: Tushar Drew MD        Subjective:     Principal Problem:Colitis        HPI:  Nithin Wagner is a 68 y.o. male with a past medical history of hepatitis-B, hypertension, diastolic heart failure, diabetes, rheumatoid arthritis, hyponatremia, s/p s/p T11 and L3 kyphoplasty on 4/22/2024 who presents to the ED with lower abdominal pain recurrent onset 4 days ago.  Patient reports associated nausea and vomiting and states he has been experiencing intermittent diarrhea over the past several months.  He denies blood in stool.  Patient  was seen here on i 06/26/2024 for emesis and weakness. Workup showed findings suggestive of colitis and dehydration. He was offered admission at that time but he stated he would prefer to go home. Stated he went home and was unable to hold down significant fluids and came back.  Reports subjective fever a few days ago.  Had a normal bowel movement this morning     ED workup significant for unremarkable CBC, mild hyponatremia 132, T bili 4.1, albumin 1.6,  and .  Lactic acid 1.2.  CT abdomen performed 06/26/2024 showed diffuse colitis.  Patient was started on IV Cipro and Flagyl in the ED and referred to Hospital Medicine.  Patient will be admitted for further evaluation and management.    Overview/Hospital Course:  Mr. Wagner presented with abdominal pain with nausea and vomiting.  Placed in observation status.  Treatment initiated for colitis with IV Cipro and Flagyl, IV fluids, antiemetics and supportive care.  GI consulted.    Interval History: No acute events overnight. Pain doing better, though with loose stool, poor intake. Discussed plan of care. No other concerns at this time.    Review of Systems   Constitutional:   Negative for chills and fever.   Respiratory:  Negative for cough and shortness of breath.    Cardiovascular:  Negative for chest pain and palpitations.   Gastrointestinal:  Positive for abdominal pain and nausea. Negative for vomiting.     Objective:     Vital Signs (Most Recent):  Temp: 98 °F (36.7 °C) (07/04/24 1946)  Pulse: 97 (07/04/24 1946)  Resp: 18 (07/04/24 1946)  BP: 115/66 (07/04/24 1946)  SpO2: (!) 92 % (07/04/24 1946) Vital Signs (24h Range):  Temp:  [97.8 °F (36.6 °C)-98.2 °F (36.8 °C)] 98 °F (36.7 °C)  Pulse:  [89-99] 97  Resp:  [16-18] 18  SpO2:  [92 %-95 %] 92 %  BP: ()/(53-79) 115/66     Weight: 81.5 kg (179 lb 10.8 oz)  Body mass index is 24.37 kg/m².    Intake/Output Summary (Last 24 hours) at 7/4/2024 2035  Last data filed at 7/4/2024 1649  Gross per 24 hour   Intake 661.73 ml   Output 285 ml   Net 376.73 ml         Physical Exam  Vitals and nursing note reviewed.   Constitutional:       General: He is not in acute distress.     Appearance: He is well-developed.   HENT:      Head: Normocephalic and atraumatic.   Eyes:      General:         Right eye: No discharge.         Left eye: No discharge.      Conjunctiva/sclera: Conjunctivae normal.   Cardiovascular:      Rate and Rhythm: Normal rate.      Pulses: Normal pulses.   Pulmonary:      Effort: Pulmonary effort is normal. No respiratory distress.   Abdominal:      General: There is distension.      Palpations: Abdomen is soft.      Tenderness: There is abdominal tenderness.   Musculoskeletal:         General: Normal range of motion.      Right lower leg: Edema present.      Left lower leg: Edema present.   Skin:     General: Skin is warm and dry.   Neurological:      Mental Status: He is alert and oriented to person, place, and time.       Significant Labs:   CBC:  Recent Labs   Lab 07/02/24  0442 07/03/24  0507 07/04/24  0525   WBC 5.63 6.27 5.49   HGB 12.4* 12.6* 11.6*   HCT 35.5* 36.7* 33.9*    225 189   GRAN 60.9  3.4 67.1   4.2 63.1  3.5   LYMPH 26.1  1.5 21.1  1.3 24.2  1.3   MONO 9.2  0.5 8.9  0.6 9.3  0.5   EOS 0.1 0.1 0.1   BASO 0.05 0.05 0.05   CMP:  Recent Labs   Lab 07/02/24  0442 07/03/24  0507 07/04/24  0525   * 131* 129*   K 3.9 3.8 3.7    100 101   CO2 24 25 24   BUN 5* 4* 4*   CREATININE 0.6 0.6 0.6   GLU 77 79 95   CALCIUM 7.2* 7.1* 7.0*   MG 1.5* 1.6 1.7   PHOS 2.9 2.8 2.6*   ALKPHOS 457* 470* 398*   * 122* 95*   ALT 87* 79* 66*   BILITOT 3.7* 3.7* 3.3*   PROT 4.6* 4.7* 4.1*   ALBUMIN 1.4* 1.4* 1.3*   ANIONGAP 6* 6* 4*      Significant Imaging: I have reviewed and interpreted all pertinent imaging results/findings within the past 24 hours.      Assessment/Plan:      * Colitis  Abnormal LFTs  Hypokalemia  - Patient with intermittent nausea, vomiting and diarrhea.  Reports subjective fever.  CT abdomen with findings concerning for colitis.   - Continue empiric Cipro and Flagyl  - Start IV fluid with lactated Ringer's at 75 cc/hr given patient appears to be volume contracted  - Appreciate GI input, further workup with MRCP was delayed  - Liver function tests with some improvement, cholestatic pattern, but with noted increase today  - Patient with some clinical improvement in symptoms and stool studies negative for C.diff  - MRCP was negative for obstruction, noted gallbladder distention and large amount of ascites  - Case discussed with GI; paracentesis 07/02 demonstrating SAAG < 1.1. Autoimmune studies in process.  - Replete lytes as required. Monitor PO intake. Schedule ondansetron; advance as tolerated.    SVT (supraventricular tachycardia)  - History SVT noted.  Followed by Cardiology, Dr. Doshi, outpatient.  States he takes digoxin and has had no recent episodes of SVT  - Continue digoxin 0.125 mg p.o. daily  - Monitor on telemetry    Type 2 diabetes mellitus, with long-term current use of insulin  - Recent HgbA1c 7.4 performed on 3/2024.  Patient has not been taking insulin and was on  tirzepeptide and stopped taking in April  - Glucose level reviewed, in normal to slightly elevated range  - Continue monitor with Accu-Cheks q.a.c. and q.h.s. and give low-dose sliding scale insulin as necessary    Chronic diastolic heart failure  - Recent Echo performed 04/03/2024 showed EF 55-60%.  Patient states he no longer takes Lasix.  Lower extremity edema noted, patient denies shortness of breath  - Clinically appears dry, and without volume overload  - Judicious IV fluids as discussed above    NAFLD (nonalcoholic fatty liver disease)  - History noted.  LFTs on admission elevated from baseline with , T bili 4.1,  and .  - As discussed above    Hepatitis B core antibody positive  - Patient states he was exposed to hep B while working in [place] in the 1990s and is on prophylaxis with lamivudine   - Continue lamivudine daily      VTE Risk Mitigation (From admission, onward)           Ordered     Place CHELE hose  Until discontinued         07/03/24 2056     IP VTE HIGH RISK PATIENT  Once         06/28/24 2107     Place sequential compression device  Until discontinued         06/28/24 2107                    Discharge Planning   ALEXANDER:      Code Status: Full Code   Is the patient medically ready for discharge?:     Reason for patient still in hospital (select all that apply): Treatment  Discharge Plan A: Home with family   Discharge Delays: None known at this time              COURTNEY Lozano MD  Department of Hospital Medicine   [facility] ([place])

## 2024-07-04 NOTE — PLAN OF CARE
Recommendations  1) If TPN warranted: via central line, administer Clinimix E 5/15 @ 85 mL/hr with standard daily lipids     - Provides 2040 mL total volume, 102 g protein, 306 g dextrose, 1948 kcal, GIR 2.6     - Start at half rate and titrate up to goal rate    - Monitor electrolytes and triglycerides while on TPN/IVFE      2) Monitor PO intake and tolerance      3) RD to monitor and follow up    Goals:   1) Pt able to tolerate initiation and advancement of TPN by RD follow up   2) Pt will have diet adv and able to tolerate 50-75% solid PO by RD follow up  Nutrition Goal Status: goal not met  Communication of RD Recs:  (POC)

## 2024-07-04 NOTE — SUBJECTIVE & OBJECTIVE
Interval History: No acute events overnight. Reports poor appetite and abdominal pain/nausea as well as anxiety. Discussed plan of care. No other concerns at this time.    Review of Systems   Constitutional:  Negative for chills and fever.   Respiratory:  Negative for cough and shortness of breath.    Cardiovascular:  Negative for chest pain and palpitations.   Gastrointestinal:  Positive for abdominal pain and nausea. Negative for vomiting.     Objective:     Vital Signs (Most Recent):  Temp: 98 °F (36.7 °C) (07/03/24 1941)  Pulse: 95 (07/03/24 1941)  Resp: 16 (07/03/24 1947)  BP: 112/70 (07/03/24 1941)  SpO2: (!) 93 % (07/03/24 1941) Vital Signs (24h Range):  Temp:  [97.7 °F (36.5 °C)-98.8 °F (37.1 °C)] 98 °F (36.7 °C)  Pulse:  [] 95  Resp:  [16-20] 16  SpO2:  [92 %-94 %] 93 %  BP: (108-130)/(57-74) 112/70     Weight: 81.5 kg (179 lb 10.8 oz)  Body mass index is 24.37 kg/m².    Intake/Output Summary (Last 24 hours) at 7/3/2024 2107  Last data filed at 7/3/2024 1840  Gross per 24 hour   Intake 1576.21 ml   Output 1030 ml   Net 546.21 ml         Physical Exam  Vitals and nursing note reviewed.   Constitutional:       General: He is not in acute distress.     Appearance: He is well-developed.   HENT:      Head: Normocephalic and atraumatic.   Eyes:      General:         Right eye: No discharge.         Left eye: No discharge.      Conjunctiva/sclera: Conjunctivae normal.   Cardiovascular:      Rate and Rhythm: Normal rate.      Pulses: Normal pulses.   Pulmonary:      Effort: Pulmonary effort is normal. No respiratory distress.   Abdominal:      General: There is distension.      Palpations: Abdomen is soft.      Tenderness: There is abdominal tenderness.   Musculoskeletal:         General: Normal range of motion.      Right lower leg: Edema present.      Left lower leg: Edema present.   Skin:     General: Skin is warm and dry.   Neurological:      Mental Status: He is alert and oriented to person, place, and  time.       Significant Labs:   CBC:  Recent Labs   Lab 07/01/24 0459 07/02/24 0442 07/03/24  0507   WBC 5.59 5.63 6.27   HGB 12.9* 12.4* 12.6*   HCT 38.4* 35.5* 36.7*    225 225   GRAN 62.7  3.5 60.9  3.4 67.1  4.2   LYMPH 25.9  1.5 26.1  1.5 21.1  1.3   MONO 8.2  0.5 9.2  0.5 8.9  0.6   EOS 0.1 0.1 0.1   BASO 0.05 0.05 0.05   CMP:  Recent Labs   Lab 07/01/24 0459 07/02/24 0442 07/03/24  0507   * 131* 131*   K 4.1 3.9 3.8    101 100   CO2 24 24 25   BUN 6* 5* 4*   CREATININE 0.7 0.6 0.6   GLU 89 77 79   CALCIUM 7.5* 7.2* 7.1*   MG  --  1.5* 1.6   PHOS  --  2.9 2.8   ALKPHOS 472* 457* 470*   * 126* 122*   ALT 98* 87* 79*   BILITOT 3.8* 3.7* 3.7*   PROT 4.9* 4.6* 4.7*   ALBUMIN 1.5* 1.4* 1.4*   ANIONGAP 6* 6* 6*      Significant Imaging: I have reviewed and interpreted all pertinent imaging results/findings within the past 24 hours.

## 2024-07-04 NOTE — PT/OT/SLP PROGRESS
Physical Therapy Treatment    Patient Name:  Nithin Wagner   MRN:  [id number]    Recommendations:     Discharge Recommendations:  (TBD, anticipate LIT)  Discharge Equipment Recommendations: none  Barriers to discharge: Inaccessible home and Decreased caregiver support(has 6 steps to enter home, lives w/ nephew who works)    Assessment:     Nithin Wagner is a 68 y.o. male admitted with a medical diagnosis of Colitis.  He presents with the following impairments/functional limitations: weakness, impaired endurance, impaired self care skills, impaired functional mobility, gait instability, impaired balance, decreased lower extremity function, decreased upper extremity function, decreased safety awareness, pain, impaired cardiopulmonary response to activity, decreased ROM ;pt with good mobility today, inc amb distance in room w/ RW and CGA, NO LOB or SOB, though pt reports legs get weak quickly and he needs to sit..    Rehab Prognosis: Good; patient would benefit from acute skilled PT services to address these deficits and reach maximum level of function.    Recent Surgery: Procedure(s) (LRB):  PARACENTESIS, ABDOMINAL (N/A) 2 Days Post-Op    Plan:     During this hospitalization, patient to be seen 5 x/week to address the identified rehab impairments via gait training, therapeutic activities, therapeutic exercises, neuromuscular re-education and progress toward the following goals:    Plan of Care Expires:  07/17/24    Subjective     Chief Complaint: pain, weakness  Patient/Family Comments/goals: pt agreeable to session, states he would like to get off of all pain medication at some point.   Pain/Comfort:  Pain Rating 1:  (chronic back pain, did not rate)  Location 1: back  Pain Addressed 1: Reposition, Distraction      Objective:     Communicated with nurse prior to session.  Patient found left sidelying with PICC line upon PT entry to room.     General Precautions: Standard, fall, diabetic  Orthopedic Precautions: N/A  (though reviewed log rolling for comfort for back)  Braces: N/A  Respiratory Status: Room air     Functional Mobility:  Bed Mobility:     Supine to Sit: supervision  Transfers:     Sit to Stand:  stand by assistance and contact guard assistance with rolling walker  Gait: amb'd ~35' w/ RW and CGA      AM-PAC 6 CLICK MOBILITY  Turning over in bed (including adjusting bedclothes, sheets and blankets)?: 4  Sitting down on and standing up from a chair with arms (e.g., wheelchair, bedside commode, etc.): 3  Moving from lying on back to sitting on the side of the bed?: 4  Moving to and from a bed to a chair (including a wheelchair)?: 3  Need to walk in hospital room?: 3  Climbing 3-5 steps with a railing?: 2  Basic Mobility Total Score: 19       Treatment & Education:  Pt inst'd in log rolling for decreasing back pain w/ t/f's.     Patient left right sidelying with all lines intact, call button in reach, and nurse notified..    GOALS:   Multidisciplinary Problems       Physical Therapy Goals          Problem: Physical Therapy    Goal Priority Disciplines Outcome Goal Variances Interventions   Physical Therapy Goal     PT, PT/OT Progressing     Description: Goals to be met by: 24    Patient will increase functional independence with mobility by performin. Sit<>stand with supervision with RW.  2. Gait x 50 feet with supervision with RW.  3. Ascend/descend 4 step(s) with least restrictive assistive device and uni HR with Shani.   4. Tolerate sitting up in chair x30 min with minimal increase in back pain.                       Time Tracking:     PT Received On: 24  PT Start Time: 1720     PT Stop Time: 1738  PT Total Time (min): 18 min     Billable Minutes: Therapeutic Activity 18    Treatment Type: Treatment  PT/PTA: PTA     Number of PTA visits since last PT visit: 2024

## 2024-07-04 NOTE — PROGRESS NOTES
[facility] - Med Surg ([place])  Adult Nutrition  Progress Note    SUMMARY       Recommendations  1) If TPN warranted: via central line, administer Clinimix E 5/15 @ 85 mL/hr with standard daily lipids     - Provides 2040 mL total volume, 102 g protein, 306 g dextrose, 1948 kcal, GIR 2.6     - Start at half rate and titrate up to goal rate    - Monitor electrolytes and triglycerides while on TPN/IVFE      2) Monitor PO intake and tolerance      3) RD to monitor and follow up    Goals:   1) Pt able to tolerate initiation and advancement of TPN by RD follow up   2) Pt will have diet adv and able to tolerate 50-75% solid PO by RD follow up  Nutrition Goal Status: goal not met  Communication of RD Recs:  (POC)    Assessment and Plan    Endocrine  Severe protein-calorie malnutrition  Malnutrition Type:  Context: acute illness or injury, chronic illness  Level: severe    Related to (etiology):   Altered GI function    Signs and Symptoms (as evidenced by):   Inability to to keep anything down d/t N/V/D  > 10% weight loss in 6 months  Pt states he hasn't been able to eat for 2 months     Malnutrition Characteristic Summary:  Weight Loss (Malnutrition): greater than 10% in 6 months  Energy Intake (Malnutrition): less than 75% for greater than or equal to 1 month (NPO/CLD since admit 6/28)  Muscle Mass (Malnutrition): severe depletion      Interventions  (treatment strategy):  Collaboration with other providers    Nutrition Diagnosis Status:   New         Malnutrition Assessment  Malnutrition Context: acute illness or injury, chronic illness  Malnutrition Level: severe  Skin (Micronutrient): bruised, turgor reduced       Weight Loss (Malnutrition): greater than 10% in 6 months  Energy Intake (Malnutrition): less than 75% for greater than or equal to 1 month (NPO/CLD since admit 6/28)  Muscle Mass (Malnutrition): severe depletion   Orbital Region (Subcutaneous Fat Loss): well nourished  Upper Arm Region (Subcutaneous Fat Loss):  severe depletion   [place] Region (Muscle Loss): mild depletion  Clavicle Bone Region (Muscle Loss): mild depletion  Clavicle and Acromion Bone Region (Muscle Loss): moderate depletion  Patellar Region (Muscle Loss): moderate depletion  Anterior Thigh Region (Muscle Loss): severe depletion  Posterior Calf Region (Muscle Loss): severe depletion                 Reason for Assessment    Reason For Assessment: RD follow-up  Diagnosis: gastrointestinal disease (colitis)  Relevant Medical History:   Patient Active Problem List   Diagnosis    Hepatitis B core antibody positive    NAFLD (nonalcoholic fatty liver disease)    Essential hypertension    Chronic diastolic heart failure    Type 2 diabetes mellitus, with long-term current use of insulin    Class 1 obesity due to excess calories with serious comorbidity and body mass index (BMI) of 34.0 to 34.9 in adult    Tobacco use disorder    Polycythemia, secondary    Canker sores oral    Coronary artery disease    Chronic use of opiate drug for therapeutic purpose    Rheumatoid arthritis flare    Fatigue    Acute on chronic diastolic heart failure    Supraventricular tachycardia    Hyponatremia    Atherosclerotic peripheral vascular disease    Folliculitis    Rheumatoid arthritis involving multiple sites with positive rheumatoid factor    Anxiety    Long term (current) use of systemic steroids    Vitamin D deficiency    Osteoporosis    Hyperlipidemia    Severe obesity    Type 2 diabetes mellitus with other circulatory complication, with long-term current use of insulin    Nuclear sclerotic cataract of right eye    Lumbar vertebral fracture    Intractable pain    Diarrhea of presumed infectious origin    Enteritis    Nephrolithiasis    Constipation    Depression    Testicular pain, left    Fatty (change of) liver, not elsewhere classified    Colitis    SVT (supraventricular tachycardia)    Severe protein-calorie malnutrition     Interdisciplinary Rounds: did not attend  General  Information Comments: Patient not able to tolerate anyting PO. CLD tray at bedside, untouched. Reports if he drinks too much, he'll throw up and if he is able to keep anything, will get diarrhea. 60 lb weight loss reported to RD. Took immodium last night, no N/V this morning. Pt states he hasn't been able to keep anything down for 2 months. Midline cath. Felipe 17 - L arm, RLQ. NFPE pt meets criteria for severe malnutrition in context of acute on chronic illness r/t altered GI function aeb inability to keep anything down d/t N/V; diarrhea; weight loss; and moderate to severe fat/muscle loss.  Nutrition Discharge Planning: pending medical course    Nutrition Risk Screen    Nutrition Risk Screen: unintentional loss of 10 lbs or more in the past 2 months, reduced oral intake over the last month    Nutrition/Diet History    Spiritual, Cultural Beliefs, Scientologist Practices, Values that Affect Care: no  Factors Affecting Nutritional Intake: abdominal pain, altered gastrointestinal function, clear liquid diet, diarrhea, nausea/vomiting    Anthropometrics    Temp: 97.8 °F (36.6 °C)  Height Method: Stated  Height: 6' (182.9 cm)  Height (inches): 72 in  Weight Method: Bed Scale  Weight: 81.5 kg (179 lb 10.8 oz)  Weight (lb): 179.68 lb  Ideal Body Weight (IBW), Male: 178 lb  % Ideal Body Weight, Male (lb): 100.94 %  BMI (Calculated): 24.4  BMI Grade: 18.5-24.9 - normal  Weight Loss: unintentional  Usual Body Weight (UBW), k kg  % Usual Body Weight: 81.67  % Weight Change From Usual Weight: -18.5 %       Lab/Procedures/Meds    Pertinent Labs Reviewed: reviewed  CBC:  Recent Labs   Lab 24  0525   WBC 5.49   HGB 11.6*   HCT 33.9*        CMP:  Recent Labs   Lab 24  0525   CALCIUM 7.0*   ALBUMIN 1.3*   PROT 4.1*   *   K 3.7   CO2 24      BUN 4*   CREATININE 0.6   ALKPHOS 398*   ALT 66*   AST 95*   BILITOT 3.3*     BMP:   Recent Labs   Lab 24  0525   GLU 95   *   K 3.7      CO2  24   BUN 4*   CREATININE 0.6   CALCIUM 7.0*   MG 1.7       Pertinent Medications Reviewed: reviewed  Scheduled Meds:   ciprofloxacin  400 mg Intravenous Q12H    digoxin  0.125 mg Oral Daily    EScitalopram oxalate  20 mg Oral QHS    lactobacillus acidophilus & bulgar  1 packet Oral TID WM    lamiVUDine  150 mg Oral QHS    LIDOcaine  1 patch Transdermal Q24H    metronidazole  500 mg Intravenous Q8H    sodium chloride 0.9%  10 mL Intravenous Q6H     Continuous Infusions:  PRN Meds:.  Current Facility-Administered Medications:     acetaminophen, 650 mg, Oral, Q8H PRN    dextrose 10%, 12.5 g, Intravenous, PRN    dextrose 10%, 25 g, Intravenous, PRN    glucagon (human recombinant), 1 mg, Intramuscular, PRN    glucose, 16 g, Oral, PRN    glucose, 24 g, Oral, PRN    hydrOXYzine HCL, 50 mg, Oral, QID PRN    insulin aspart U-100, 0-5 Units, Subcutaneous, QID (AC + HS) PRN    melatonin, 6 mg, Oral, Nightly PRN    naloxone, 0.02 mg, Intravenous, PRN    ondansetron, 4 mg, Intravenous, Q6H PRN    oxyCODONE-acetaminophen, 1 tablet, Oral, Q4H PRN    prochlorperazine, 5 mg, Intravenous, Q6H PRN    Flushing PICC/Midline Protocol, , , Until Discontinued **AND** sodium chloride 0.9%, 10 mL, Intravenous, Q6H **AND** sodium chloride 0.9%, 10 mL, Intravenous, PRN    Estimated/Assessed Needs    Weight Used For Calorie Calculations: 81.5 kg (179 lb 10.8 oz)  Energy Calorie Requirements (kcal): 3543-5906  Energy Need Method: Kcal/kg (25-30)  Protein Requirements:  g (1.2-1.5 g/kg during colitis flare)  Weight Used For Protein Calculations: 81.5 kg (179 lb 10.8 oz)  Fluid Requirements (mL): 1 mL/kcal  Estimated Fluid Requirement Method:  (or per MD)  RDA Method (mL): 2037  CHO Requirement: 250 g (50% EEN)      Nutrition Prescription Ordered    Current Diet Order: CLD    Evaluation of Received Nutrient/Fluid Intake    % Kcal Needs: 0%  % Protein Needs: 0%  I/O: + 6.8 L since admit  Energy Calories Required: not meeting needs  Protein  Required: not meeting needs  Fluid Required: not meeting needs  Comments: LBM: 7/4  Tolerance: not tolerating  % Intake of Estimated Energy Needs: 0 - 25 %  % Meal Intake: 0 - 25 %    Nutrition Risk    Level of Risk/Frequency of Follow-up: moderate     Monitor and Evaluation    Food and Nutrient Intake: energy intake, food and beverage intake, parenteral nutrition intake  Food and Nutrient Adminstration: diet order, enteral and parenteral nutrition administration  Knowledge/Beliefs/Attitudes: food and nutrition knowledge/skill, beliefs and attitudes  Physical Activity and Function: nutrition-related ADLs and IADLs  Anthropometric Measurements: weight, weight change  Biochemical Data, Medical Tests and Procedures: electrolyte and renal panel, gastrointestinal profile, glucose/endocrine profile, inflammatory profile, lipid profile     Nutrition Follow-Up    RD Follow-up?: Yes    Radha Quintero RDN, FARHANA[facility]

## 2024-07-04 NOTE — PLAN OF CARE
POC reviewed with patient. AAOx4. Stable on room air. Patient took few spoons of jello and broth and started having dry heaves and x 3 episodes of loose stool,treated as per MAR. Received IV antibiotics according to MAR. BSC and urinal utilized for voiding. Patient is 1 person assist to BSC.Instructed to call for help ambulating. No injuries, falls, or trauma occurred during shift. Purposeful rounding completed. Bed low and locked with side rails up x 3 and call light within reach.   Problem: Adult Inpatient Plan of Care  Goal: Plan of Care Review  Outcome: Progressing  Goal: Patient-Specific Goal (Individualized)  Outcome: Progressing  Goal: Absence of Hospital-Acquired Illness or Injury  Outcome: Progressing  Goal: Optimal Comfort and Wellbeing  Outcome: Progressing  Goal: Readiness for Transition of Care  Outcome: Progressing     Problem: Diabetes Comorbidity  Goal: Blood Glucose Level Within Targeted Range  Outcome: Progressing     Problem: Wound  Goal: Optimal Coping  Outcome: Progressing  Goal: Optimal Functional Ability  Outcome: Progressing  Goal: Absence of Infection Signs and Symptoms  Outcome: Progressing  Goal: Improved Oral Intake  Outcome: Progressing  Goal: Optimal Pain Control and Function  Outcome: Progressing  Goal: Skin Health and Integrity  Outcome: Progressing  Goal: Optimal Wound Healing  Outcome: Progressing     Problem: Infection  Goal: Absence of Infection Signs and Symptoms  Outcome: Progressing     Problem: Skin Injury Risk Increased  Goal: Skin Health and Integrity  Outcome: Progressing     Problem: Nausea and Vomiting  Goal: Nausea and Vomiting Relief  Outcome: Progressing

## 2024-07-04 NOTE — ASSESSMENT & PLAN NOTE
Abnormal LFTs  Hypokalemia  - Patient with intermittent nausea, vomiting and diarrhea.  Reports subjective fever.  CT abdomen with findings concerning for colitis.   - Continue empiric Cipro and Flagyl  - Start IV fluid with lactated Ringer's at 75 cc/hr given patient appears to be volume contracted  - Appreciate GI input, further workup with MRCP was delayed  - Liver function tests with some improvement, cholestatic pattern, but with noted increase today  - Patient with some clinical improvement in symptoms and stool studies negative for C.diff  - MRCP was negative for obstruction, noted gallbladder distention and large amount of ascites  - Case discussed with GI; paracentesis 07/02 demonstrating SAAG < 1.1. Autoimmune studies in process.  - Replete lytes as required. Monitor PO intake. Anticipate discharge as symptoms improve and tolerates diet.

## 2024-07-04 NOTE — ASSESSMENT & PLAN NOTE
Malnutrition Type:  Context: acute illness or injury, chronic illness  Level: severe    Related to (etiology):   Altered GI function    Signs and Symptoms (as evidenced by):   Inability to to keep anything down d/t N/V/D  > 10% weight loss in 6 months  Pt states he hasn't been able to eat for 2 months     Malnutrition Characteristic Summary:  Weight Loss (Malnutrition): greater than 10% in 6 months  Energy Intake (Malnutrition): less than 75% for greater than or equal to 1 month (NPO/CLD since admit 6/28)  Muscle Mass (Malnutrition): severe depletion      Interventions/Recommendations (treatment strategy):  1) If pt to remain on PPN: Consider increasing Clinimix 4.25/5 to 100 mL/hr with IVFE to meet 65% EEN (25 kcal/kg/d) and 104% EPN (1.2 g/kg/d) 2) If TPN warranted: via central line, administer Clinimix E 5/15 @ 85 mL/hr with standard daily lipids  - Provides 2040 mL total volume, 102 g protein, 306 g dextrose, 1948 kcal, GIR 2.6 - Start at half rate and titrate up to goal rate  3) Monitor PO intake and tolerance  4) RD to monitor and follow up    Nutrition Diagnosis Status:   Continues

## 2024-07-05 ENCOUNTER — TELEPHONE (OUTPATIENT)
Dept: GASTROENTEROLOGY | Facility: CLINIC | Age: 69
End: 2024-07-05
Payer: MEDICARE

## 2024-07-05 LAB
ALBUMIN SERPL BCP-MCNC: 1.4 G/DL (ref 3.5–5.2)
ALP SERPL-CCNC: 418 U/L (ref 55–135)
ALT SERPL W/O P-5'-P-CCNC: 66 U/L (ref 10–44)
ANA SER QL IF: NORMAL
ANION GAP SERPL CALC-SCNC: 8 MMOL/L (ref 8–16)
AST SERPL-CCNC: 92 U/L (ref 10–40)
BASOPHILS # BLD AUTO: 0.05 K/UL (ref 0–0.2)
BASOPHILS NFR BLD: 0.8 % (ref 0–1.9)
BILIRUB SERPL-MCNC: 3.4 MG/DL (ref 0.1–1)
BUN SERPL-MCNC: 4 MG/DL (ref 8–23)
CA-I BLDV-SCNC: 0.98 MMOL/L (ref 1.06–1.42)
CALCIUM SERPL-MCNC: 7.2 MG/DL (ref 8.7–10.5)
CHLORIDE SERPL-SCNC: 101 MMOL/L (ref 95–110)
CO2 SERPL-SCNC: 23 MMOL/L (ref 23–29)
CREAT SERPL-MCNC: 0.6 MG/DL (ref 0.5–1.4)
DIFFERENTIAL METHOD BLD: ABNORMAL
EOSINOPHIL # BLD AUTO: 0.1 K/UL (ref 0–0.5)
EOSINOPHIL NFR BLD: 1.9 % (ref 0–8)
ERYTHROCYTE [DISTWIDTH] IN BLOOD BY AUTOMATED COUNT: 16.4 % (ref 11.5–14.5)
EST. GFR  (NO RACE VARIABLE): >60 ML/MIN/1.73 M^2
GLUCOSE SERPL-MCNC: 70 MG/DL (ref 70–110)
HCT VFR BLD AUTO: 36.8 % (ref 40–54)
HGB BLD-MCNC: 12.8 G/DL (ref 14–18)
IMM GRANULOCYTES # BLD AUTO: 0.01 K/UL (ref 0–0.04)
IMM GRANULOCYTES NFR BLD AUTO: 0.2 % (ref 0–0.5)
LYMPHOCYTES # BLD AUTO: 1.5 K/UL (ref 1–4.8)
LYMPHOCYTES NFR BLD: 23.8 % (ref 18–48)
MAGNESIUM SERPL-MCNC: 1.7 MG/DL (ref 1.6–2.6)
MCH RBC QN AUTO: 30.3 PG (ref 27–31)
MCHC RBC AUTO-ENTMCNC: 34.8 G/DL (ref 32–36)
MCV RBC AUTO: 87 FL (ref 82–98)
MITOCHONDRIA AB TITR SER IF: NORMAL {TITER}
MONOCYTES # BLD AUTO: 0.6 K/UL (ref 0.3–1)
MONOCYTES NFR BLD: 10 % (ref 4–15)
NEUTROPHILS # BLD AUTO: 3.9 K/UL (ref 1.8–7.7)
NEUTROPHILS NFR BLD: 63.3 % (ref 38–73)
NRBC BLD-RTO: 0 /100 WBC
PHOSPHATE SERPL-MCNC: 3.3 MG/DL (ref 2.7–4.5)
PLATELET # BLD AUTO: 202 K/UL (ref 150–450)
PMV BLD AUTO: 10.9 FL (ref 9.2–12.9)
POCT GLUCOSE: 100 MG/DL (ref 70–110)
POCT GLUCOSE: 73 MG/DL (ref 70–110)
POCT GLUCOSE: 76 MG/DL (ref 70–110)
POCT GLUCOSE: 85 MG/DL (ref 70–110)
POCT GLUCOSE: 95 MG/DL (ref 70–110)
POTASSIUM SERPL-SCNC: 3.9 MMOL/L (ref 3.5–5.1)
PROT SERPL-MCNC: 4.5 G/DL (ref 6–8.4)
RBC # BLD AUTO: 4.23 M/UL (ref 4.6–6.2)
SMOOTH MUSCLE AB TITR SER IF: NORMAL {TITER}
SODIUM SERPL-SCNC: 132 MMOL/L (ref 136–145)
WBC # BLD AUTO: 6.17 K/UL (ref 3.9–12.7)

## 2024-07-05 PROCEDURE — 82330 ASSAY OF CALCIUM: CPT | Performed by: INTERNAL MEDICINE

## 2024-07-05 PROCEDURE — 63600175 PHARM REV CODE 636 W HCPCS: Performed by: INTERNAL MEDICINE

## 2024-07-05 PROCEDURE — 25000003 PHARM REV CODE 250: Performed by: NURSE PRACTITIONER

## 2024-07-05 PROCEDURE — 94761 N-INVAS EAR/PLS OXIMETRY MLT: CPT

## 2024-07-05 PROCEDURE — 63600175 PHARM REV CODE 636 W HCPCS: Mod: JZ,JG | Performed by: NURSE PRACTITIONER

## 2024-07-05 PROCEDURE — 25000003 PHARM REV CODE 250: Performed by: INTERNAL MEDICINE

## 2024-07-05 PROCEDURE — 36415 COLL VENOUS BLD VENIPUNCTURE: CPT | Performed by: INTERNAL MEDICINE

## 2024-07-05 PROCEDURE — 80053 COMPREHEN METABOLIC PANEL: CPT | Performed by: HOSPITALIST

## 2024-07-05 PROCEDURE — 25000003 PHARM REV CODE 250: Performed by: HOSPITALIST

## 2024-07-05 PROCEDURE — 11000001 HC ACUTE MED/SURG PRIVATE ROOM

## 2024-07-05 PROCEDURE — 84100 ASSAY OF PHOSPHORUS: CPT | Performed by: HOSPITALIST

## 2024-07-05 PROCEDURE — 85025 COMPLETE CBC W/AUTO DIFF WBC: CPT | Performed by: HOSPITALIST

## 2024-07-05 PROCEDURE — A4216 STERILE WATER/SALINE, 10 ML: HCPCS | Performed by: HOSPITALIST

## 2024-07-05 PROCEDURE — 83735 ASSAY OF MAGNESIUM: CPT | Performed by: HOSPITALIST

## 2024-07-05 PROCEDURE — 27000221 HC OXYGEN, UP TO 24 HOURS

## 2024-07-05 PROCEDURE — 99900035 HC TECH TIME PER 15 MIN (STAT)

## 2024-07-05 RX ORDER — PANTOPRAZOLE SODIUM 40 MG/10ML
40 INJECTION, POWDER, LYOPHILIZED, FOR SOLUTION INTRAVENOUS DAILY
Status: DISCONTINUED | OUTPATIENT
Start: 2024-07-05 | End: 2024-07-12

## 2024-07-05 RX ORDER — LOPERAMIDE HCL 1MG/7.5ML
1 LIQUID (ML) ORAL 4 TIMES DAILY PRN
Status: DISCONTINUED | OUTPATIENT
Start: 2024-07-05 | End: 2024-07-14 | Stop reason: HOSPADM

## 2024-07-05 RX ADMIN — METRONIDAZOLE 500 MG: 5 INJECTION, SOLUTION INTRAVENOUS at 05:07

## 2024-07-05 RX ADMIN — LAMIVUDINE 150 MG: 150 TABLET, FILM COATED ORAL at 08:07

## 2024-07-05 RX ADMIN — METRONIDAZOLE 500 MG: 5 INJECTION, SOLUTION INTRAVENOUS at 01:07

## 2024-07-05 RX ADMIN — Medication 16 G: at 12:07

## 2024-07-05 RX ADMIN — CIPROFLOXACIN 400 MG: 2 INJECTION, SOLUTION INTRAVENOUS at 05:07

## 2024-07-05 RX ADMIN — Medication 1 EACH: at 01:07

## 2024-07-05 RX ADMIN — ONDANSETRON 4 MG: 2 INJECTION INTRAMUSCULAR; INTRAVENOUS at 06:07

## 2024-07-05 RX ADMIN — ASCORBIC ACID, VITAMIN A PALMITATE, CHOLECALCIFEROL, THIAMINE HYDROCHLORIDE, RIBOFLAVIN-5 PHOSPHATE SODIUM, PYRIDOXINE HYDROCHLORIDE, NIACINAMIDE, DEXPANTHENOL, ALPHA-TOCOPHEROL ACETATE, VITAMIN K1, FOLIC ACID, BIOTIN, CYANOCOBALAMIN: 200; 3300; 200; 6; 3.6; 6; 40; 15; 10; 150; 600; 60; 5 INJECTION, SOLUTION INTRAVENOUS at 08:07

## 2024-07-05 RX ADMIN — SODIUM CHLORIDE, PRESERVATIVE FREE 10 ML: 5 INJECTION INTRAVENOUS at 12:07

## 2024-07-05 RX ADMIN — ESCITALOPRAM OXALATE 20 MG: 10 TABLET ORAL at 08:07

## 2024-07-05 RX ADMIN — DIGOXIN 0.12 MG: 125 TABLET ORAL at 09:07

## 2024-07-05 RX ADMIN — OXYCODONE AND ACETAMINOPHEN 1 TABLET: 10; 325 TABLET ORAL at 06:07

## 2024-07-05 RX ADMIN — OXYCODONE AND ACETAMINOPHEN 1 TABLET: 10; 325 TABLET ORAL at 02:07

## 2024-07-05 RX ADMIN — Medication 1 EACH: at 05:07

## 2024-07-05 RX ADMIN — OXYCODONE AND ACETAMINOPHEN 1 TABLET: 10; 325 TABLET ORAL at 01:07

## 2024-07-05 RX ADMIN — PANTOPRAZOLE SODIUM 40 MG: 40 INJECTION, POWDER, LYOPHILIZED, FOR SOLUTION INTRAVENOUS at 09:07

## 2024-07-05 RX ADMIN — HYDROXYZINE HYDROCHLORIDE 50 MG: 25 TABLET ORAL at 06:07

## 2024-07-05 RX ADMIN — SODIUM CHLORIDE, PRESERVATIVE FREE 10 ML: 5 INJECTION INTRAVENOUS at 05:07

## 2024-07-05 RX ADMIN — PROCHLORPERAZINE EDISYLATE 5 MG: 5 INJECTION INTRAMUSCULAR; INTRAVENOUS at 02:07

## 2024-07-05 RX ADMIN — ONDANSETRON 4 MG: 2 INJECTION INTRAMUSCULAR; INTRAVENOUS at 01:07

## 2024-07-05 RX ADMIN — ONDANSETRON 4 MG: 2 INJECTION INTRAMUSCULAR; INTRAVENOUS at 05:07

## 2024-07-05 RX ADMIN — OXYCODONE AND ACETAMINOPHEN 1 TABLET: 10; 325 TABLET ORAL at 10:07

## 2024-07-05 RX ADMIN — SODIUM CHLORIDE, PRESERVATIVE FREE 10 ML: 5 INJECTION INTRAVENOUS at 01:07

## 2024-07-05 RX ADMIN — Medication 1 EACH: at 09:07

## 2024-07-05 NOTE — PLAN OF CARE
Medicare Message     Important Message from Medicare regarding Discharge Appeal Rights -- Explained to patient/caregiver; Other (comments)Important Message from Medicare regarding Discharge Appeal Rights. Explained to patient/caregiver; Other (comments). The comment is Verbal Consent. Taken on 7/5/24 1344   Date IMM was signed -- 7/5/2024   Time IMM was signed -- 0057

## 2024-07-05 NOTE — SUBJECTIVE & OBJECTIVE
Interval History: No acute events overnight. Pain doing better, though with loose stool, poor intake. Discussed plan of care. No other concerns at this time.    Review of Systems   Constitutional:  Negative for chills and fever.   Respiratory:  Negative for cough and shortness of breath.    Cardiovascular:  Negative for chest pain and palpitations.   Gastrointestinal:  Positive for abdominal pain and nausea. Negative for vomiting.     Objective:     Vital Signs (Most Recent):  Temp: 98 °F (36.7 °C) (07/04/24 1946)  Pulse: 97 (07/04/24 1946)  Resp: 18 (07/04/24 1946)  BP: 115/66 (07/04/24 1946)  SpO2: (!) 92 % (07/04/24 1946) Vital Signs (24h Range):  Temp:  [97.8 °F (36.6 °C)-98.2 °F (36.8 °C)] 98 °F (36.7 °C)  Pulse:  [89-99] 97  Resp:  [16-18] 18  SpO2:  [92 %-95 %] 92 %  BP: ()/(53-79) 115/66     Weight: 81.5 kg (179 lb 10.8 oz)  Body mass index is 24.37 kg/m².    Intake/Output Summary (Last 24 hours) at 7/4/2024 2035  Last data filed at 7/4/2024 1649  Gross per 24 hour   Intake 661.73 ml   Output 285 ml   Net 376.73 ml         Physical Exam  Vitals and nursing note reviewed.   Constitutional:       General: He is not in acute distress.     Appearance: He is well-developed.   HENT:      Head: Normocephalic and atraumatic.   Eyes:      General:         Right eye: No discharge.         Left eye: No discharge.      Conjunctiva/sclera: Conjunctivae normal.   Cardiovascular:      Rate and Rhythm: Normal rate.      Pulses: Normal pulses.   Pulmonary:      Effort: Pulmonary effort is normal. No respiratory distress.   Abdominal:      General: There is distension.      Palpations: Abdomen is soft.      Tenderness: There is abdominal tenderness.   Musculoskeletal:         General: Normal range of motion.      Right lower leg: Edema present.      Left lower leg: Edema present.   Skin:     General: Skin is warm and dry.   Neurological:      Mental Status: He is alert and oriented to person, place, and time.        Significant Labs:   CBC:  Recent Labs   Lab 07/02/24 0442 07/03/24  0507 07/04/24  0525   WBC 5.63 6.27 5.49   HGB 12.4* 12.6* 11.6*   HCT 35.5* 36.7* 33.9*    225 189   GRAN 60.9  3.4 67.1  4.2 63.1  3.5   LYMPH 26.1  1.5 21.1  1.3 24.2  1.3   MONO 9.2  0.5 8.9  0.6 9.3  0.5   EOS 0.1 0.1 0.1   BASO 0.05 0.05 0.05   CMP:  Recent Labs   Lab 07/02/24 0442 07/03/24  0507 07/04/24  0525   * 131* 129*   K 3.9 3.8 3.7    100 101   CO2 24 25 24   BUN 5* 4* 4*   CREATININE 0.6 0.6 0.6   GLU 77 79 95   CALCIUM 7.2* 7.1* 7.0*   MG 1.5* 1.6 1.7   PHOS 2.9 2.8 2.6*   ALKPHOS 457* 470* 398*   * 122* 95*   ALT 87* 79* 66*   BILITOT 3.7* 3.7* 3.3*   PROT 4.6* 4.7* 4.1*   ALBUMIN 1.4* 1.4* 1.3*   ANIONGAP 6* 6* 4*      Significant Imaging: I have reviewed and interpreted all pertinent imaging results/findings within the past 24 hours.

## 2024-07-05 NOTE — PROGRESS NOTES
[facility] Medicine  Progress Note    Patient Name: Nithin Wagner  MRN: [id number]  Patient Class: IP- Inpatient   Admission Date: 6/28/2024  Length of Stay: 6 days  Attending Physician: CARISA Lozano MD  Primary Care Provider: Tushar Drew MD        Subjective:     Principal Problem:Colitis        HPI:  Nithin Wagner is a 68 y.o. male with a past medical history of hepatitis-B, hypertension, diastolic heart failure, diabetes, rheumatoid arthritis, hyponatremia, s/p s/p T11 and L3 kyphoplasty on 4/22/2024 who presents to the ED with lower abdominal pain recurrent onset 4 days ago.  Patient reports associated nausea and vomiting and states he has been experiencing intermittent diarrhea over the past several months.  He denies blood in stool.  Patient  was seen here on i 06/26/2024 for emesis and weakness. Workup showed findings suggestive of colitis and dehydration. He was offered admission at that time but he stated he would prefer to go home. Stated he went home and was unable to hold down significant fluids and came back.  Reports subjective fever a few days ago.  Had a normal bowel movement this morning     ED workup significant for unremarkable CBC, mild hyponatremia 132, T bili 4.1, albumin 1.6,  and .  Lactic acid 1.2.  CT abdomen performed 06/26/2024 showed diffuse colitis.  Patient was started on IV Cipro and Flagyl in the ED and referred to Hospital Medicine.  Patient will be admitted for further evaluation and management.    Overview/Hospital Course:  Mr. Wagner presented with abdominal pain with nausea and vomiting.  Placed in observation status.  Treatment initiated for colitis with IV Cipro and Flagyl, IV fluids, antiemetics and supportive care.  GI consulted.    Interval History: No acute events overnight. Nausea present though decreased. Pain doing well. No new concerns at this time.    Review of Systems   Constitutional:  Negative for chills and fever.    Respiratory:  Negative for cough and shortness of breath.    Cardiovascular:  Negative for chest pain and palpitations.   Gastrointestinal:  Positive for nausea. Negative for abdominal pain and vomiting.     Objective:     Vital Signs (Most Recent):  Temp: 97.5 °F (36.4 °C) (07/05/24 1546)  Pulse: 96 (07/05/24 1546)  Resp: 16 (07/05/24 1827)  BP: 116/77 (07/05/24 1546)  SpO2: (!) 92 % (07/05/24 1546) Vital Signs (24h Range):  Temp:  [97.5 °F (36.4 °C)-98.3 °F (36.8 °C)] 97.5 °F (36.4 °C)  Pulse:  [] 96  Resp:  [16-18] 16  SpO2:  [92 %-95 %] 92 %  BP: (111-125)/(56-82) 116/77     Weight: 83.5 kg (184 lb 1.4 oz)  Body mass index is 24.97 kg/m².    Intake/Output Summary (Last 24 hours) at 7/5/2024 1839  Last data filed at 7/5/2024 1810  Gross per 24 hour   Intake 839.18 ml   Output 676 ml   Net 163.18 ml         Physical Exam  Vitals and nursing note reviewed.   Constitutional:       General: He is not in acute distress.     Appearance: He is well-developed.   HENT:      Head: Normocephalic and atraumatic.   Eyes:      General:         Right eye: No discharge.         Left eye: No discharge.      Conjunctiva/sclera: Conjunctivae normal.   Cardiovascular:      Rate and Rhythm: Normal rate.      Pulses: Normal pulses.   Pulmonary:      Effort: Pulmonary effort is normal. No respiratory distress.   Abdominal:      General: There is distension.      Palpations: Abdomen is soft.      Tenderness: There is abdominal tenderness.   Musculoskeletal:         General: Normal range of motion.      Right lower leg: Edema present.      Left lower leg: Edema present.   Skin:     General: Skin is warm and dry.   Neurological:      Mental Status: He is alert and oriented to person, place, and time.         Significant Labs:   CBC:  Recent Labs   Lab 07/03/24  0507 07/04/24  0525 07/05/24  0533   WBC 6.27 5.49 6.17   HGB 12.6* 11.6* 12.8*   HCT 36.7* 33.9* 36.8*    189 202   GRAN 67.1  4.2 63.1  3.5 63.3  3.9   LYMPH  21.1  1.3 24.2  1.3 23.8  1.5   MONO 8.9  0.6 9.3  0.5 10.0  0.6   EOS 0.1 0.1 0.1   BASO 0.05 0.05 0.05   CMP:  Recent Labs   Lab 07/03/24  0507 07/04/24  0525 07/05/24  0533   * 129* 132*   K 3.8 3.7 3.9    101 101   CO2 25 24 23   BUN 4* 4* 4*   CREATININE 0.6 0.6 0.6   GLU 79 95 70   CALCIUM 7.1* 7.0* 7.2*   MG 1.6 1.7 1.7   PHOS 2.8 2.6* 3.3   ALKPHOS 470* 398* 418*   * 95* 92*   ALT 79* 66* 66*   BILITOT 3.7* 3.3* 3.4*   PROT 4.7* 4.1* 4.5*   ALBUMIN 1.4* 1.3* 1.4*   ANIONGAP 6* 4* 8      Significant Imaging: I have reviewed and interpreted all pertinent imaging results/findings within the past 24 hours.      Assessment/Plan:      * Colitis  Abnormal LFTs  Hypokalemia  - Patient with intermittent nausea, vomiting and diarrhea.  Reports subjective fever.  CT abdomen with findings concerning for colitis.   - Complete 7 days of ciprofloxacin/metronidazole.  - Start IV fluid with lactated Ringer's at 75 cc/hr given patient appears to be volume contracted  - Appreciate GI input, further workup with MRCP was delayed  - Liver function tests with some improvement, cholestatic pattern  - Patient with some clinical improvement in symptoms and stool studies negative for C.diff  - MRCP was negative for obstruction, noted gallbladder distention and large amount of ascites  - Case discussed with GI; paracentesis 07/02 demonstrating SAAG 1.1. Autoimmune studies in process.  - Replete lytes as required. Monitor PO intake. Continue ondansetron; advance as tolerated.    SVT (supraventricular tachycardia)  - History SVT noted.  Followed by Cardiology, Dr. Doshi, outpatient.  States he takes digoxin and has had no recent episodes of SVT  - Continue digoxin 0.125 mg p.o. daily  - Monitor on telemetry    Type 2 diabetes mellitus, with long-term current use of insulin  - Recent HgbA1c 7.4 performed on 3/2024.  Patient has not been taking insulin and was on tirzepeptide and stopped taking in April  - Glucose  level reviewed, in normal to slightly elevated range  - Continue monitor with Accu-Cheks q.a.c. and q.h.s. and give low-dose sliding scale insulin as necessary    Chronic diastolic heart failure  - Recent Echo performed 04/03/2024 showed EF 55-60%.  Patient states he no longer takes Lasix.  Lower extremity edema noted, patient denies shortness of breath  - Clinically appears dry, and without volume overload  - Judicious IV fluids as discussed above    NAFLD (nonalcoholic fatty liver disease)  - History noted.  LFTs on admission elevated from baseline with , T bili 4.1,  and .  - As discussed above    Hepatitis B core antibody positive  - Patient states he was exposed to hep B while working in [place] in the 1990s and is on prophylaxis with lamivudine   - Continue lamivudine daily      VTE Risk Mitigation (From admission, onward)           Ordered     Place CHELE hose  Until discontinued         07/03/24 2056     IP VTE HIGH RISK PATIENT  Once         06/28/24 2107     Place sequential compression device  Until discontinued         06/28/24 2107                    Discharge Planning   ALEXANDER:      Code Status: Full Code   Is the patient medically ready for discharge?:     Reason for patient still in hospital (select all that apply): Treatment  Discharge Plan A: Home with family   Discharge Delays: None known at this time              COURTNEY Lozano MD  Department of Hospital Medicine   [facility] - Med Surg ([place])

## 2024-07-05 NOTE — ASSESSMENT & PLAN NOTE
Abnormal LFTs  Hypokalemia  - Patient with intermittent nausea, vomiting and diarrhea.  Reports subjective fever.  CT abdomen with findings concerning for colitis.   - Continue empiric Cipro and Flagyl  - Start IV fluid with lactated Ringer's at 75 cc/hr given patient appears to be volume contracted  - Appreciate GI input, further workup with MRCP was delayed  - Liver function tests with some improvement, cholestatic pattern, but with noted increase today  - Patient with some clinical improvement in symptoms and stool studies negative for C.diff  - MRCP was negative for obstruction, noted gallbladder distention and large amount of ascites  - Case discussed with GI; paracentesis 07/02 demonstrating SAAG < 1.1. Autoimmune studies in process.  - Replete lytes as required. Monitor PO intake. Schedule ondansetron; advance as tolerated.

## 2024-07-05 NOTE — TELEPHONE ENCOUNTER
----- Message from Nichole Harman sent at 7/5/2024  1:54 PM CDT -----  Type:  Sooner Appointment Request      Name of Caller:case management  When is the first available appointment?8/27  Symptoms: R11.2 (ICD-10-CM) - Nausea and vomiting, unspecified vomiting type  Would the patient rather a call back or a response via [employer]? call  Best Call Back Number: [phone]  Additional Information:

## 2024-07-05 NOTE — SUBJECTIVE & OBJECTIVE
Interval History: No acute events overnight. Nausea present though decreased. Pain doing well. No new concerns at this time.    Review of Systems   Constitutional:  Negative for chills and fever.   Respiratory:  Negative for cough and shortness of breath.    Cardiovascular:  Negative for chest pain and palpitations.   Gastrointestinal:  Positive for nausea. Negative for abdominal pain and vomiting.     Objective:     Vital Signs (Most Recent):  Temp: 97.5 °F (36.4 °C) (07/05/24 1546)  Pulse: 96 (07/05/24 1546)  Resp: 16 (07/05/24 1827)  BP: 116/77 (07/05/24 1546)  SpO2: (!) 92 % (07/05/24 1546) Vital Signs (24h Range):  Temp:  [97.5 °F (36.4 °C)-98.3 °F (36.8 °C)] 97.5 °F (36.4 °C)  Pulse:  [] 96  Resp:  [16-18] 16  SpO2:  [92 %-95 %] 92 %  BP: (111-125)/(56-82) 116/77     Weight: 83.5 kg (184 lb 1.4 oz)  Body mass index is 24.97 kg/m².    Intake/Output Summary (Last 24 hours) at 7/5/2024 1839  Last data filed at 7/5/2024 1810  Gross per 24 hour   Intake 839.18 ml   Output 676 ml   Net 163.18 ml         Physical Exam  Vitals and nursing note reviewed.   Constitutional:       General: He is not in acute distress.     Appearance: He is well-developed.   HENT:      Head: Normocephalic and atraumatic.   Eyes:      General:         Right eye: No discharge.         Left eye: No discharge.      Conjunctiva/sclera: Conjunctivae normal.   Cardiovascular:      Rate and Rhythm: Normal rate.      Pulses: Normal pulses.   Pulmonary:      Effort: Pulmonary effort is normal. No respiratory distress.   Abdominal:      General: There is distension.      Palpations: Abdomen is soft.      Tenderness: There is abdominal tenderness.   Musculoskeletal:         General: Normal range of motion.      Right lower leg: Edema present.      Left lower leg: Edema present.   Skin:     General: Skin is warm and dry.   Neurological:      Mental Status: He is alert and oriented to person, place, and time.         Significant Labs:    CBC:  Recent Labs   Lab 07/03/24  0507 07/04/24  0525 07/05/24  0533   WBC 6.27 5.49 6.17   HGB 12.6* 11.6* 12.8*   HCT 36.7* 33.9* 36.8*    189 202   GRAN 67.1  4.2 63.1  3.5 63.3  3.9   LYMPH 21.1  1.3 24.2  1.3 23.8  1.5   MONO 8.9  0.6 9.3  0.5 10.0  0.6   EOS 0.1 0.1 0.1   BASO 0.05 0.05 0.05   CMP:  Recent Labs   Lab 07/03/24  0507 07/04/24  0525 07/05/24  0533   * 129* 132*   K 3.8 3.7 3.9    101 101   CO2 25 24 23   BUN 4* 4* 4*   CREATININE 0.6 0.6 0.6   GLU 79 95 70   CALCIUM 7.1* 7.0* 7.2*   MG 1.6 1.7 1.7   PHOS 2.8 2.6* 3.3   ALKPHOS 470* 398* 418*   * 95* 92*   ALT 79* 66* 66*   BILITOT 3.7* 3.3* 3.4*   PROT 4.7* 4.1* 4.5*   ALBUMIN 1.4* 1.3* 1.4*   ANIONGAP 6* 4* 8      Significant Imaging: I have reviewed and interpreted all pertinent imaging results/findings within the past 24 hours.

## 2024-07-05 NOTE — PROGRESS NOTES
Gastroenterology Progress Note    Reason for Consult: N/V, abnormal LFTs, abdominal pain, diarrhea    Subjective:  LFTs still elevated but improving.  Paracentesis done earlier this week.  Albumin still low.  Ferritin high and iron sat high.  Not eating much.  Started a liquid diet but soon after consuming, he had diarrhea.  2 mg of imodium constipates him though.  No steatorrhea.      ROS:  Gen: no F/C  CV: no CP/palpitations  Resp: no SOB/wheezing    Physical Exam  /82 (BP Location: Right arm, Patient Position: Lying)   Pulse 101   Temp 98.3 °F (36.8 °C) (Oral)   Resp 18   Ht 6' (1.829 m)   Wt 83.5 kg (184 lb 1.4 oz)   SpO2 (!) 94%   BMI 24.97 kg/m²   General: Awake, alert male in no apparent distress  HEENT: NC/AT, PERRL, EOMI, MMM  CV: RRR, without murmur, gallop, or rubs  Pulm: CTAB, no wheezing, rales, or rhonchi  GI: soft, non-tender throughout, non-distended, +BS  MSK: no edema and normal ROM  Neuro: A&Ox3, moves all extremities equally, sensation grossly intact, no asterixis  Skin: no rashes or lesions, no jaundice  Psych: normal mood and affect    Medications/Infusions:  Current Facility-Administered Medications   Medication Dose Route Frequency Provider Last Rate Last Admin    acetaminophen tablet 650 mg  650 mg Oral Q8H PRN Valeria Patel NP        ciprofloxacin (CIPRO)400mg/200ml D5W IVPB 400 mg  400 mg Intravenous Q12H Valeria Patel NP   Stopped at 07/05/24 0633    dextrose 10% bolus 125 mL 125 mL  12.5 g Intravenous PRN Valeria Patel NP        dextrose 10% bolus 250 mL 250 mL  25 g Intravenous PRN Valeria Patel NP        digoxin tablet 0.125 mg  0.125 mg Oral Daily Valeria Patel NP   0.125 mg at 07/04/24 0839    EScitalopram oxalate tablet 20 mg  20 mg Oral QHS Valeria Patel NP   20 mg at 07/04/24 2050    glucagon (human recombinant) injection 1 mg  1 mg Intramuscular PRN Patel, Valeria L., NP        glucose chewable tablet 16 g  16 g Oral PRN Amanda,  Valeria BUENO NP   16 g at 07/05/24 0012    glucose chewable tablet 24 g  24 g Oral PRN Valeria Patel NP        hydrOXYzine HCL tablet 50 mg  50 mg Oral QID PRN CARISA Lozano MD   50 mg at 07/05/24 0654    insulin aspart U-100 pen 0-5 Units  0-5 Units Subcutaneous QID (AC + HS) PRN Valeria Patel NP        lactobacillus acidophilus & bulgar 100 million cell packet 1 each  1 packet Oral TID  CARISA Lozano MD   1 each at 07/04/24 1736    lamiVUDine tablet 150 mg  150 mg Oral QHS Valeria Patel NP   150 mg at 07/04/24 2054    LIDOcaine 5 % patch 1 patch  1 patch Transdermal Q24H Valeria Patel NP   1 patch at 06/28/24 2203    loperamide capsule 2 mg  2 mg Oral QID PRN CARISA Lozano MD   2 mg at 07/04/24 1820    melatonin tablet 6 mg  6 mg Oral Nightly PRN Valeria Patel NP   6 mg at 07/03/24 2354    metronidazole IVPB 500 mg  500 mg Intravenous Q8H Valeria Patel NP   Stopped at 07/05/24 0636    naloxone 0.4 mg/mL injection 0.02 mg  0.02 mg Intravenous PRN Valeria Patel NP        ondansetron injection 4 mg  4 mg Intravenous Q6H CARISA Lozano MD   4 mg at 07/05/24 0639    oxyCODONE-acetaminophen  mg per tablet 1 tablet  1 tablet Oral Q4H PRCARISA Young MD   1 tablet at 07/05/24 0653    prochlorperazine injection Soln 5 mg  5 mg Intravenous Q6H PRCARISA Young MD   5 mg at 07/05/24 0241    sodium chloride 0.9% flush 10 mL  10 mL Intravenous Q6H Darby Obrien MD   10 mL at 07/05/24 0538    And    sodium chloride 0.9% flush 10 mL  10 mL Intravenous PRN Darby Obrien MD   10 mL at 07/03/24 0606       Intake and Output:    Intake/Output Summary (Last 24 hours) at 7/5/2024 0723  Last data filed at 7/5/2024 0653  Gross per 24 hour   Intake 131.3 ml   Output 586 ml   Net -454.7 ml       Labs:  Lab Results   Component Value Date/Time    WBC 6.17 07/05/2024 05:33 AM    HGB 12.8 (L) 07/05/2024 05:33 AM    HCT 36.8 (L) 07/05/2024 05:33 AM    HCT 52  04/26/2024 05:18 PM     07/05/2024 05:33 AM    MCV 87 07/05/2024 05:33 AM     (L) 07/05/2024 05:33 AM    K 3.9 07/05/2024 05:33 AM     07/05/2024 05:33 AM    CO2 23 07/05/2024 05:33 AM    CO2 11 (H) 11/04/2021 04:34 PM    BUN 4 (L) 07/05/2024 05:33 AM    CREATININE 0.6 07/05/2024 05:33 AM    GLU 70 07/05/2024 05:33 AM    CALCIUM 7.2 (L) 07/05/2024 05:33 AM    MG 1.7 07/05/2024 05:33 AM    PHOS 3.3 07/05/2024 05:33 AM    INR 1.5 (H) 07/02/2024 04:42 AM    APTT 28.8 06/28/2024 10:31 PM   ]  Lab Results   Component Value Date/Time    PROT 4.5 (L) 07/05/2024 05:33 AM    ALBUMIN 1.4 (L) 07/05/2024 05:33 AM    ALBUMIN 4.1 06/21/2022 07:23 AM    BILITOT 3.4 (H) 07/05/2024 05:33 AM    BILIDIR 3.0 (H) 06/30/2024 04:31 AM    AST 92 (H) 07/05/2024 05:33 AM    ALT 66 (H) 07/05/2024 05:33 AM    ALKPHOS 418 (H) 07/05/2024 05:33 AM   ]    Imaging and Procedures:  Labs and imaging results were reviewed.  RUQ U/S 6/28/24:  Distended gallbladder. No gallstones or ultrasonographic evidence to suggest acute cholecystitis.     CT A/P 6/27/24:  Diffuse colitis, of numerous possible etiologies.  Correlate clinically.  Gastroduodenitis, likely with some proximal jejunal enteritis.  Small quantity of intraperitoneal free fluid.  No pneumoperitoneum.  Bilateral nonobstructing intrarenal calculi.  Profound diffuse hepatic steatosis.  Correlate clinically.  Consider outpatient referral to a hepatology/gastroenterology.  Bibasilar pulmonary opacities similar to 06/02/2024, possibly representing subsegmental atelectasis and scarring.  Infectious, inflammatory, or neoplastic process is not excluded.  Correlate clinically, and with follow-up outpatient chest CT within 3-6 months.    Assessment:  Nithin Wagner is a 68 y.o. male with a history of HTN, DM2, HLD, Hep B - cleared (Surface Ag neg), RA, NAFLD, prior CVA, CAD s/p PCI and diastolic CHF w/ atrial myxoma who presented with weakness, N/V, diarrhea and dehydration.  GI  consulted for abnormal LFTs.      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 Monday if still in the hospital  - no evidence of HE, but continue to monitor for mental status changes  - optimize nutrition  - check calprotectin and elastase  - will try liquid imodium -1 mg to see if this helps with diarrhea without causing constipation  - outpatient colon to workup diarrhea and for colon cancer screening - has never had one    Janet Win MD

## 2024-07-05 NOTE — PLAN OF CARE
Patient resting well in bed, no sign of acute distress or discomfort noted at the time. Respirations even and unlabored. VS stable, afebrile. Pt Safety measures in place Bed locked in lowest position, call bell within reach.      Problem: Adult Inpatient Plan of Care  Goal: Plan of Care Review  Outcome: Progressing  Goal: Patient-Specific Goal (Individualized)  Outcome: Progressing  Goal: Absence of Hospital-Acquired Illness or Injury  Outcome: Progressing  Goal: Optimal Comfort and Wellbeing  Outcome: Progressing  Goal: Readiness for Transition of Care  Outcome: Progressing     Problem: Diabetes Comorbidity  Goal: Blood Glucose Level Within Targeted Range  Outcome: Progressing     Problem: Wound  Goal: Optimal Coping  Outcome: Progressing  Goal: Optimal Functional Ability  Outcome: Progressing  Goal: Absence of Infection Signs and Symptoms  Outcome: Progressing  Goal: Improved Oral Intake  Outcome: Progressing  Goal: Optimal Pain Control and Function  Outcome: Progressing  Goal: Skin Health and Integrity  Outcome: Progressing  Goal: Optimal Wound Healing  Outcome: Progressing     Problem: Infection  Goal: Absence of Infection Signs and Symptoms  Outcome: Progressing     Problem: Skin Injury Risk Increased  Goal: Skin Health and Integrity  Outcome: Progressing     Problem: Nausea and Vomiting  Goal: Nausea and Vomiting Relief  Outcome: Progressing

## 2024-07-06 LAB
ALBUMIN SERPL BCP-MCNC: 1.4 G/DL (ref 3.5–5.2)
ALP SERPL-CCNC: 400 U/L (ref 55–135)
ALT SERPL W/O P-5'-P-CCNC: 60 U/L (ref 10–44)
ANION GAP SERPL CALC-SCNC: 8 MMOL/L (ref 8–16)
AST SERPL-CCNC: 77 U/L (ref 10–40)
BASOPHILS # BLD AUTO: 0.06 K/UL (ref 0–0.2)
BASOPHILS NFR BLD: 1.2 % (ref 0–1.9)
BILIRUB SERPL-MCNC: 3.2 MG/DL (ref 0.1–1)
BUN SERPL-MCNC: 5 MG/DL (ref 8–23)
CALCIUM SERPL-MCNC: 7.3 MG/DL (ref 8.7–10.5)
CHLORIDE SERPL-SCNC: 99 MMOL/L (ref 95–110)
CO2 SERPL-SCNC: 23 MMOL/L (ref 23–29)
CREAT SERPL-MCNC: 0.7 MG/DL (ref 0.5–1.4)
DIFFERENTIAL METHOD BLD: ABNORMAL
EOSINOPHIL # BLD AUTO: 0.1 K/UL (ref 0–0.5)
EOSINOPHIL NFR BLD: 2.3 % (ref 0–8)
ERYTHROCYTE [DISTWIDTH] IN BLOOD BY AUTOMATED COUNT: 16.4 % (ref 11.5–14.5)
EST. GFR  (NO RACE VARIABLE): >60 ML/MIN/1.73 M^2
GLUCOSE SERPL-MCNC: 132 MG/DL (ref 70–110)
HCT VFR BLD AUTO: 37 % (ref 40–54)
HGB BLD-MCNC: 12.8 G/DL (ref 14–18)
IMM GRANULOCYTES # BLD AUTO: 0.01 K/UL (ref 0–0.04)
IMM GRANULOCYTES NFR BLD AUTO: 0.2 % (ref 0–0.5)
INR PPP: 1.7 (ref 0.8–1.2)
LYMPHOCYTES # BLD AUTO: 1.1 K/UL (ref 1–4.8)
LYMPHOCYTES NFR BLD: 22.8 % (ref 18–48)
MAGNESIUM SERPL-MCNC: 1.7 MG/DL (ref 1.6–2.6)
MCH RBC QN AUTO: 30.4 PG (ref 27–31)
MCHC RBC AUTO-ENTMCNC: 34.6 G/DL (ref 32–36)
MCV RBC AUTO: 88 FL (ref 82–98)
MONOCYTES # BLD AUTO: 0.5 K/UL (ref 0.3–1)
MONOCYTES NFR BLD: 9.3 % (ref 4–15)
NEUTROPHILS # BLD AUTO: 3.1 K/UL (ref 1.8–7.7)
NEUTROPHILS NFR BLD: 64.2 % (ref 38–73)
NRBC BLD-RTO: 0 /100 WBC
PHOSPHATE SERPL-MCNC: 3.1 MG/DL (ref 2.7–4.5)
PLATELET # BLD AUTO: 198 K/UL (ref 150–450)
PMV BLD AUTO: 11 FL (ref 9.2–12.9)
POCT GLUCOSE: 162 MG/DL (ref 70–110)
POCT GLUCOSE: 163 MG/DL (ref 70–110)
POCT GLUCOSE: 172 MG/DL (ref 70–110)
POCT GLUCOSE: 201 MG/DL (ref 70–110)
POTASSIUM SERPL-SCNC: 3.9 MMOL/L (ref 3.5–5.1)
PREALB SERPL-MCNC: 5 MG/DL (ref 20–43)
PROT SERPL-MCNC: 4.5 G/DL (ref 6–8.4)
PROTHROMBIN TIME: 18.3 SEC (ref 9–12.5)
RBC # BLD AUTO: 4.21 M/UL (ref 4.6–6.2)
SODIUM SERPL-SCNC: 130 MMOL/L (ref 136–145)
TRIGL SERPL-MCNC: 218 MG/DL (ref 30–150)
WBC # BLD AUTO: 4.86 K/UL (ref 3.9–12.7)

## 2024-07-06 PROCEDURE — 84100 ASSAY OF PHOSPHORUS: CPT | Performed by: HOSPITALIST

## 2024-07-06 PROCEDURE — 84478 ASSAY OF TRIGLYCERIDES: CPT | Performed by: INTERNAL MEDICINE

## 2024-07-06 PROCEDURE — 94761 N-INVAS EAR/PLS OXIMETRY MLT: CPT

## 2024-07-06 PROCEDURE — 25000003 PHARM REV CODE 250: Performed by: INTERNAL MEDICINE

## 2024-07-06 PROCEDURE — 84134 ASSAY OF PREALBUMIN: CPT | Performed by: INTERNAL MEDICINE

## 2024-07-06 PROCEDURE — 63600175 PHARM REV CODE 636 W HCPCS: Performed by: INTERNAL MEDICINE

## 2024-07-06 PROCEDURE — 63600175 PHARM REV CODE 636 W HCPCS: Performed by: NURSE PRACTITIONER

## 2024-07-06 PROCEDURE — B4185 PARENTERAL SOL 10 GM LIPIDS: HCPCS | Performed by: INTERNAL MEDICINE

## 2024-07-06 PROCEDURE — 36415 COLL VENOUS BLD VENIPUNCTURE: CPT | Performed by: INTERNAL MEDICINE

## 2024-07-06 PROCEDURE — 25000003 PHARM REV CODE 250: Performed by: HOSPITALIST

## 2024-07-06 PROCEDURE — 85610 PROTHROMBIN TIME: CPT | Performed by: INTERNAL MEDICINE

## 2024-07-06 PROCEDURE — 11000001 HC ACUTE MED/SURG PRIVATE ROOM

## 2024-07-06 PROCEDURE — 80053 COMPREHEN METABOLIC PANEL: CPT | Performed by: HOSPITALIST

## 2024-07-06 PROCEDURE — 25000003 PHARM REV CODE 250: Performed by: NURSE PRACTITIONER

## 2024-07-06 PROCEDURE — 83735 ASSAY OF MAGNESIUM: CPT | Performed by: HOSPITALIST

## 2024-07-06 PROCEDURE — A4216 STERILE WATER/SALINE, 10 ML: HCPCS | Performed by: HOSPITALIST

## 2024-07-06 PROCEDURE — 85025 COMPLETE CBC W/AUTO DIFF WBC: CPT | Performed by: HOSPITALIST

## 2024-07-06 RX ORDER — MAGNESIUM SULFATE HEPTAHYDRATE 40 MG/ML
2 INJECTION, SOLUTION INTRAVENOUS ONCE
Status: COMPLETED | OUTPATIENT
Start: 2024-07-06 | End: 2024-07-06

## 2024-07-06 RX ORDER — METOCLOPRAMIDE 10 MG/1
10 TABLET ORAL
Status: DISCONTINUED | OUTPATIENT
Start: 2024-07-06 | End: 2024-07-14 | Stop reason: HOSPADM

## 2024-07-06 RX ADMIN — Medication 1 EACH: at 12:07

## 2024-07-06 RX ADMIN — ONDANSETRON 4 MG: 2 INJECTION INTRAMUSCULAR; INTRAVENOUS at 05:07

## 2024-07-06 RX ADMIN — Medication 1 EACH: at 09:07

## 2024-07-06 RX ADMIN — SODIUM CHLORIDE, PRESERVATIVE FREE 10 ML: 5 INJECTION INTRAVENOUS at 05:07

## 2024-07-06 RX ADMIN — ONDANSETRON 4 MG: 2 INJECTION INTRAMUSCULAR; INTRAVENOUS at 12:07

## 2024-07-06 RX ADMIN — PANTOPRAZOLE SODIUM 40 MG: 40 INJECTION, POWDER, LYOPHILIZED, FOR SOLUTION INTRAVENOUS at 09:07

## 2024-07-06 RX ADMIN — MAGNESIUM SULFATE HEPTAHYDRATE 2 G: 40 INJECTION, SOLUTION INTRAVENOUS at 09:07

## 2024-07-06 RX ADMIN — LAMIVUDINE 150 MG: 150 TABLET, FILM COATED ORAL at 08:07

## 2024-07-06 RX ADMIN — METOCLOPRAMIDE 10 MG: 10 TABLET ORAL at 11:07

## 2024-07-06 RX ADMIN — OXYCODONE AND ACETAMINOPHEN 1 TABLET: 10; 325 TABLET ORAL at 09:07

## 2024-07-06 RX ADMIN — I.V. FAT EMULSION 250 ML: 20 EMULSION INTRAVENOUS at 11:07

## 2024-07-06 RX ADMIN — METOCLOPRAMIDE 10 MG: 10 TABLET ORAL at 06:07

## 2024-07-06 RX ADMIN — DIGOXIN 0.12 MG: 125 TABLET ORAL at 09:07

## 2024-07-06 RX ADMIN — SODIUM CHLORIDE, PRESERVATIVE FREE 10 ML: 5 INJECTION INTRAVENOUS at 12:07

## 2024-07-06 RX ADMIN — INSULIN ASPART 2 UNITS: 100 INJECTION, SOLUTION INTRAVENOUS; SUBCUTANEOUS at 05:07

## 2024-07-06 RX ADMIN — OXYCODONE AND ACETAMINOPHEN 1 TABLET: 10; 325 TABLET ORAL at 03:07

## 2024-07-06 RX ADMIN — METOCLOPRAMIDE 10 MG: 10 TABLET ORAL at 05:07

## 2024-07-06 RX ADMIN — ESCITALOPRAM OXALATE 20 MG: 10 TABLET ORAL at 08:07

## 2024-07-06 RX ADMIN — Medication 1 EACH: at 05:07

## 2024-07-06 RX ADMIN — OXYCODONE AND ACETAMINOPHEN 1 TABLET: 10; 325 TABLET ORAL at 08:07

## 2024-07-06 RX ADMIN — ASCORBIC ACID, VITAMIN A PALMITATE, CHOLECALCIFEROL, THIAMINE HYDROCHLORIDE, RIBOFLAVIN-5 PHOSPHATE SODIUM, PYRIDOXINE HYDROCHLORIDE, NIACINAMIDE, DEXPANTHENOL, ALPHA-TOCOPHEROL ACETATE, VITAMIN K1, FOLIC ACID, BIOTIN, CYANOCOBALAMIN: 200; 3300; 200; 6; 3.6; 6; 40; 15; 10; 150; 600; 60; 5 INJECTION, SOLUTION INTRAVENOUS at 09:07

## 2024-07-06 RX ADMIN — OXYCODONE AND ACETAMINOPHEN 1 TABLET: 10; 325 TABLET ORAL at 02:07

## 2024-07-06 NOTE — PROGRESS NOTES
[facility] Medicine  Progress Note    Patient Name: Nithin Wagner  MRN: [id number]  Patient Class: IP- Inpatient   Admission Date: 6/28/2024  Length of Stay: 7 days  Attending Physician: CARISA Lozano MD  Primary Care Provider: Tushar Drew MD        Subjective:     Principal Problem:Colitis        HPI:  Nithin Wagner is a 68 y.o. male with a past medical history of hepatitis-B, hypertension, diastolic heart failure, diabetes, rheumatoid arthritis, hyponatremia, s/p s/p T11 and L3 kyphoplasty on 4/22/2024 who presents to the ED with lower abdominal pain recurrent onset 4 days ago.  Patient reports associated nausea and vomiting and states he has been experiencing intermittent diarrhea over the past several months.  He denies blood in stool.  Patient  was seen here on i 06/26/2024 for emesis and weakness. Workup showed findings suggestive of colitis and dehydration. He was offered admission at that time but he stated he would prefer to go home. Stated he went home and was unable to hold down significant fluids and came back.  Reports subjective fever a few days ago.  Had a normal bowel movement this morning     ED workup significant for unremarkable CBC, mild hyponatremia 132, T bili 4.1, albumin 1.6,  and .  Lactic acid 1.2.  CT abdomen performed 06/26/2024 showed diffuse colitis.  Patient was started on IV Cipro and Flagyl in the ED and referred to Hospital Medicine.  Patient will be admitted for further evaluation and management.    Overview/Hospital Course:  Mr. Wagner presented with abdominal pain with nausea and vomiting.  Placed in observation status.  Treatment initiated for colitis with IV Cipro and Flagyl, IV fluids, antiemetics and supportive care.  GI consulted.    Interval History: No acute events overnight. Mild abdominal pain but appetite remains poor. Added metoclopramide, ondansetron and PPN. Discussed plan of care. No other concerns at this  time.    Review of Systems   Constitutional:  Negative for chills and fever.   Respiratory:  Negative for cough and shortness of breath.    Cardiovascular:  Negative for chest pain and palpitations.   Gastrointestinal:  Positive for abdominal pain. Negative for nausea and vomiting.     Objective:     Vital Signs (Most Recent):  Temp: 98.3 °F (36.8 °C) (07/06/24 1647)  Pulse: 88 (07/06/24 1647)  Resp: 18 (07/06/24 1647)  BP: 118/72 (07/06/24 1647)  SpO2: 95 % (07/06/24 1647) Vital Signs (24h Range):  Temp:  [97.7 °F (36.5 °C)-98.3 °F (36.8 °C)] 98.3 °F (36.8 °C)  Pulse:  [83-94] 88  Resp:  [16-18] 18  SpO2:  [92 %-95 %] 95 %  BP: (109-131)/(69-82) 118/72     Weight: 83.3 kg (183 lb 10.3 oz)  Body mass index is 24.91 kg/m².    Intake/Output Summary (Last 24 hours) at 7/6/2024 1803  Last data filed at 7/6/2024 1433  Gross per 24 hour   Intake 954.18 ml   Output 975 ml   Net -20.82 ml         Physical Exam  Vitals and nursing note reviewed.   Constitutional:       General: He is not in acute distress.     Appearance: He is well-developed.   HENT:      Head: Normocephalic and atraumatic.   Eyes:      General:         Right eye: No discharge.         Left eye: No discharge.      Conjunctiva/sclera: Conjunctivae normal.   Cardiovascular:      Rate and Rhythm: Normal rate.      Pulses: Normal pulses.   Pulmonary:      Effort: Pulmonary effort is normal. No respiratory distress.   Abdominal:      General: There is distension.      Palpations: Abdomen is soft.      Tenderness: There is abdominal tenderness.   Musculoskeletal:         General: Normal range of motion.      Right lower leg: Edema present.      Left lower leg: Edema present.   Skin:     General: Skin is warm and dry.   Neurological:      Mental Status: He is alert and oriented to person, place, and time.           Significant Labs:   CBC:  Recent Labs   Lab 07/04/24  0525 07/05/24  0533 07/06/24  0443   WBC 5.49 6.17 4.86   HGB 11.6* 12.8* 12.8*   HCT 33.9* 36.8*  37.0*    202 198   GRAN 63.1  3.5 63.3  3.9 64.2  3.1   LYMPH 24.2  1.3 23.8  1.5 22.8  1.1   MONO 9.3  0.5 10.0  0.6 9.3  0.5   EOS 0.1 0.1 0.1   BASO 0.05 0.05 0.06   CMP:  Recent Labs   Lab 07/04/24  0525 07/05/24  0533 07/06/24  0443   * 132* 130*   K 3.7 3.9 3.9    101 99   CO2 24 23 23   BUN 4* 4* 5*   CREATININE 0.6 0.6 0.7   GLU 95 70 132*   CALCIUM 7.0* 7.2* 7.3*   MG 1.7 1.7 1.7   PHOS 2.6* 3.3 3.1   ALKPHOS 398* 418* 400*   AST 95* 92* 77*   ALT 66* 66* 60*   BILITOT 3.3* 3.4* 3.2*   PROT 4.1* 4.5* 4.5*   ALBUMIN 1.3* 1.4* 1.4*   ANIONGAP 4* 8 8      Significant Imaging: I have reviewed and interpreted all pertinent imaging results/findings within the past 24 hours.      Assessment/Plan:      * Colitis  Abnormal LFTs  Hypokalemia  - Patient with intermittent nausea, vomiting and diarrhea.  Reports subjective fever.  CT abdomen with findings concerning for colitis.   - Complete 7 days of ciprofloxacin/metronidazole.  - Start IV fluid with lactated Ringer's at 75 cc/hr given patient appears to be volume contracted  - Appreciate GI input, further workup with MRCP was delayed  - Liver function tests with some improvement, cholestatic pattern  - Patient with some clinical improvement in symptoms and stool studies negative for C.diff  - MRCP was negative for obstruction, noted gallbladder distention and large amount of ascites  - Case discussed with GI; paracentesis 07/02 demonstrating SAAG 1.1. Autoimmune studies in process.  - Replete lytes as required. Monitor PO intake. Continue ondansetron, add metoclopramide, continue PPN.    SVT (supraventricular tachycardia)  - History SVT noted.  Followed by Cardiology, Dr. Doshi, outpatient.  States he takes digoxin and has had no recent episodes of SVT  - Continue digoxin 0.125 mg p.o. daily  - Monitor on telemetry    Type 2 diabetes mellitus, with long-term current use of insulin  - Recent HgbA1c 7.4 performed on 3/2024.  Patient has not  been taking insulin and was on tirzepeptide and stopped taking in April  - Glucose level reviewed, in normal to slightly elevated range  - Continue monitor with Accu-Cheks q.a.c. and q.h.s. and give low-dose sliding scale insulin as necessary    Chronic diastolic heart failure  - Recent Echo performed 04/03/2024 showed EF 55-60%.  Patient states he no longer takes Lasix.  Lower extremity edema noted, patient denies shortness of breath  - Clinically appears near euvolemic though with anasarca due to liver disease / hypoalbuminemia.    NAFLD (nonalcoholic fatty liver disease)  - History noted.  LFTs on admission elevated from baseline with , T bili 4.1,  and .  - As discussed above    Hepatitis B core antibody positive  - Patient states he was exposed to hep B while working in [place] in the 1990s and is on prophylaxis with lamivudine   - Continue lamivudine daily      VTE Risk Mitigation (From admission, onward)           Ordered     Place CHELE hose  Until discontinued         07/03/24 2056     IP VTE HIGH RISK PATIENT  Once         06/28/24 2107     Place sequential compression device  Until discontinued         06/28/24 2107                    Discharge Planning   ALEXANDER:      Code Status: Full Code   Is the patient medically ready for discharge?:     Reason for patient still in hospital (select all that apply): Treatment  Discharge Plan A: Home Health   Discharge Delays: None known at this time              COURTNEY Lozano MD  Department of Hospital Medicine   [facility] ([place])

## 2024-07-06 NOTE — PLAN OF CARE
POC reviewed with patient. NOEOx4. VSS on room air.  Complaints of pain treated with PRN pain medication according to MAR. No other complaints verbalized during shift. TPN started during this shift. Midline dressing changed during shift per protocol. No injuries, falls, or trauma occurred during shift. Purposeful rounding completed. Bed low and locked with side rails up x 3 and call light within reach.  Safety maintained throughout shift.     Problem: Adult Inpatient Plan of Care  Goal: Plan of Care Review  Outcome: Progressing  Goal: Patient-Specific Goal (Individualized)  Outcome: Progressing  Goal: Absence of Hospital-Acquired Illness or Injury  Outcome: Progressing  Goal: Optimal Comfort and Wellbeing  Outcome: Progressing  Goal: Readiness for Transition of Care  Outcome: Progressing     Problem: Diabetes Comorbidity  Goal: Blood Glucose Level Within Targeted Range  Outcome: Progressing     Problem: Wound  Goal: Optimal Coping  Outcome: Progressing  Goal: Optimal Functional Ability  Outcome: Progressing  Goal: Absence of Infection Signs and Symptoms  Outcome: Progressing  Goal: Improved Oral Intake  Outcome: Progressing  Goal: Optimal Pain Control and Function  Outcome: Progressing  Goal: Skin Health and Integrity  Outcome: Progressing  Goal: Optimal Wound Healing  Outcome: Progressing     Problem: Nausea and Vomiting  Goal: Nausea and Vomiting Relief  Outcome: Progressing

## 2024-07-06 NOTE — PLAN OF CARE
07/06/24 1339   Discharge Reassessment   Assessment Type Discharge Planning Reassessment   Did the patient's condition or plan change since previous assessment? No   Discharge Plan discussed with: Patient   Communicated ALEXANDER with patient/caregiver Yes   Discharge Plan A Home Health   Discharge Plan B Home with family   DME Needed Upon Discharge  none   Transition of Care Barriers None   Why the patient remains in the hospital Requires continued medical care   Post-Acute Status   Discharge Delays None known at this time

## 2024-07-06 NOTE — ASSESSMENT & PLAN NOTE
Abnormal LFTs  Hypokalemia  - Patient with intermittent nausea, vomiting and diarrhea.  Reports subjective fever.  CT abdomen with findings concerning for colitis.   - Complete 7 days of ciprofloxacin/metronidazole.  - Start IV fluid with lactated Ringer's at 75 cc/hr given patient appears to be volume contracted  - Appreciate GI input, further workup with MRCP was delayed  - Liver function tests with some improvement, cholestatic pattern  - Patient with some clinical improvement in symptoms and stool studies negative for C.diff  - MRCP was negative for obstruction, noted gallbladder distention and large amount of ascites  - Case discussed with GI; paracentesis 07/02 demonstrating SAAG 1.1. Autoimmune studies in process.  - Replete lytes as required. Monitor PO intake. Continue ondansetron, add metoclopramide, continue PPN.

## 2024-07-06 NOTE — ASSESSMENT & PLAN NOTE
Abnormal LFTs  Hypokalemia  - Patient with intermittent nausea, vomiting and diarrhea.  Reports subjective fever.  CT abdomen with findings concerning for colitis.   - Complete 7 days of ciprofloxacin/metronidazole.  - Start IV fluid with lactated Ringer's at 75 cc/hr given patient appears to be volume contracted  - Appreciate GI input, further workup with MRCP was delayed  - Liver function tests with some improvement, cholestatic pattern  - Patient with some clinical improvement in symptoms and stool studies negative for C.diff  - MRCP was negative for obstruction, noted gallbladder distention and large amount of ascites  - Case discussed with GI; paracentesis 07/02 demonstrating SAAG 1.1. Autoimmune studies in process.  - Replete lytes as required. Monitor PO intake. Continue ondansetron; advance as tolerated.

## 2024-07-06 NOTE — PROGRESS NOTES
"[facility] ([place])  Gastroenterology  Progress Note    Patient Name: Nithin Wagner  MRN: [id number]  Admission Date: 6/28/2024  Hospital Length of Stay: 7 days  Code Status: Full Code   Attending Provider: CARISA Lozano MD  Consulting Provider: Kevin Singh MD  Primary Care Physician: Tushar Drew MD  Principal Problem: Colitis        Subjective:     Interval History: Doing better today with resolution of vomiting and no diarrhea for 2 days.    Review of Systems  Objective:     Vital Signs (Most Recent):  Temp: 98.1 °F (36.7 °C) (07/06/24 1140)  Pulse: 83 (07/06/24 1140)  Resp: 18 (07/06/24 1140)  BP: 131/76 (07/06/24 1140)  SpO2: (!) 93 % (07/06/24 1140) Vital Signs (24h Range):  Temp:  [97.5 °F (36.4 °C)-98.1 °F (36.7 °C)] 98.1 °F (36.7 °C)  Pulse:  [83-96] 83  Resp:  [16-18] 18  SpO2:  [92 %-95 %] 93 %  BP: (109-131)/(69-82) 131/76     Weight: 83.3 kg (183 lb 10.3 oz) (07/06/24 0430)  Body mass index is 24.91 kg/m².      Intake/Output Summary (Last 24 hours) at 7/6/2024 1434  Last data filed at 7/6/2024 1433  Gross per 24 hour   Intake 754.18 ml   Output 975 ml   Net -220.82 ml       Lines/Drains/Airways       Peripheral Intravenous Line  Duration                  Midline Catheter - Single Lumen 06/29/24 1435 Left basilic vein (medial side of arm) other (see comments) 6 days                     Physical Exam  Constitutional:       General: He is not in acute distress.     Appearance: He is not ill-appearing, toxic-appearing or diaphoretic.   HENT:      Head: Normocephalic and atraumatic.   Pulmonary:      Effort: Pulmonary effort is normal.          Significant Labs:  Acute Hepatitis Panel: No results for input(s): "HEPBSAG", "HEPAIGM", "HEPCAB" in the last 48 hours.    Invalid input(s): "HAPBIGM"  CBC:   Recent Labs   Lab 07/05/24  0533 07/06/24 0443   WBC 6.17 4.86   HGB 12.8* 12.8*   HCT 36.8* 37.0*    198     CMP:   Recent Labs   Lab 07/06/24 0443   *   CALCIUM 7.3* " "  ALBUMIN 1.4*   PROT 4.5*   *   K 3.9   CO2 23   CL 99   BUN 5*   CREATININE 0.7   ALKPHOS 400*   ALT 60*   AST 77*   BILITOT 3.2*     Lipase: No results for input(s): "LIPASE" in the last 48 hours.      Significant Imaging:  EXAMINATION:  MRI ABDOMEN WITHOUT CONTRAST MRCP     CLINICAL HISTORY:  abnormal LFTs, distended GB - r/o distal biliary obstruction;     TECHNIQUE:  Multiplanar, multisequence images are performed through the liver and upper abdomen.  Additionally 2D and 3D MRCP sequences are performed as well as MIP images.     COMPARISON:  Ultrasound 06/28/2024, CT 06/27/2024     FINDINGS:  Bilateral small pleural effusion.     No liver lesions.  Hepatic steatosis.     The biliary ducts are not dilated.  The gallbladder is distended measuring 12.8 x 6.0 x 5.8 cm.  No filling defect to suggest cholelithiasis.  The common bile duct tapers normally.  The pancreatic duct is not dilated.     The distal esophagus and stomach demonstrate nothing unusual.     The spleen and bilateral adrenal glands appear normal.     The bilateral kidneys appear within normal limits.  Right renal cyst 1.5 cm.  Subcentimeter left renal lesion possibly cysts.     The abdominal aorta tapers normally.     Loss of height of the T11, L2 and L3 vertebrae, to correlate with most recent CT examination.     Impression:     Gallbladder distension, no definite obstructive process seen.     Hepatic steatosis.     Large amount of ascites.     Bilateral small pleural effusion.        Electronically signed by:Chantal Hodgson MD  Date:                                            07/01/2024  Time:                                           13:20    Assessment:  Nithin Wagner is a 68 y.o. male with a history of HTN, DM2, HLD, Hep B - cleared (Surface Ag neg), RA, NAFLD, prior CVA, CAD s/p PCI and diastolic CHF w/ atrial myxoma who presented with weakness, N/V, diarrhea and dehydration.  GI consulted for abnormal LFTs.       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 Monday if still in the hospital  - no evidence of HE, but continue to monitor for mental status changes  - optimize nutrition  - check calprotectin and elastase  - will try liquid imodium -1 mg to see if this helps with diarrhea without causing constipation  - outpatient colon to workup diarrhea and for colon cancer screening - has never had one    Doing better today clinically and per labs. Try to advance diet as tolerated. Possible EGD Monday if still here.    LEIGH ANN Singh MD  Assessment/Plan:     Assessment:  Nithin Wagner is a 68 y.o. male with a history of HTN, DM2, HLD, Hep B - cleared (Surface Ag neg), RA, NAFLD, prior CVA, CAD s/p PCI and diastolic CHF w/ atrial myxoma who presented with weakness, N/V, diarrhea and dehydration.  GI consulted for abnormal LFTs.       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 Monday if still in the hospital  - no evidence of HE, but continue to monitor for mental status changes  - optimize nutrition  - check calprotectin and elastase  - will try liquid  imodium -1 mg to see if this helps with diarrhea without causing constipation  - outpatient colon to workup diarrhea and for colon cancer screening - has never had one    Doing better clinically and per labs/LFTs. Try to advance diet. Conisder EGD if still here Monday.    Kevin Singh MD  Gastroenterology  [facility] ([place])

## 2024-07-06 NOTE — SUBJECTIVE & OBJECTIVE
"    Subjective:     Interval History: Doing better today with resolution of vomiting and no diarrhea for 2 days.    Review of Systems  Objective:     Vital Signs (Most Recent):  Temp: 98.1 °F (36.7 °C) (07/06/24 1140)  Pulse: 83 (07/06/24 1140)  Resp: 18 (07/06/24 1140)  BP: 131/76 (07/06/24 1140)  SpO2: (!) 93 % (07/06/24 1140) Vital Signs (24h Range):  Temp:  [97.5 °F (36.4 °C)-98.1 °F (36.7 °C)] 98.1 °F (36.7 °C)  Pulse:  [83-96] 83  Resp:  [16-18] 18  SpO2:  [92 %-95 %] 93 %  BP: (109-131)/(69-82) 131/76     Weight: 83.3 kg (183 lb 10.3 oz) (07/06/24 0430)  Body mass index is 24.91 kg/m².      Intake/Output Summary (Last 24 hours) at 7/6/2024 1434  Last data filed at 7/6/2024 1433  Gross per 24 hour   Intake 754.18 ml   Output 975 ml   Net -220.82 ml       Lines/Drains/Airways       Peripheral Intravenous Line  Duration                  Midline Catheter - Single Lumen 06/29/24 1435 Left basilic vein (medial side of arm) other (see comments) 6 days                     Physical Exam  Constitutional:       General: He is not in acute distress.     Appearance: He is not ill-appearing, toxic-appearing or diaphoretic.   HENT:      Head: Normocephalic and atraumatic.   Pulmonary:      Effort: Pulmonary effort is normal.          Significant Labs:  Acute Hepatitis Panel: No results for input(s): "HEPBSAG", "HEPAIGM", "HEPCAB" in the last 48 hours.    Invalid input(s): "HAPBIGM"  CBC:   Recent Labs   Lab 07/05/24  0533 07/06/24  0443   WBC 6.17 4.86   HGB 12.8* 12.8*   HCT 36.8* 37.0*    198     CMP:   Recent Labs   Lab 07/06/24  0443   *   CALCIUM 7.3*   ALBUMIN 1.4*   PROT 4.5*   *   K 3.9   CO2 23   CL 99   BUN 5*   CREATININE 0.7   ALKPHOS 400*   ALT 60*   AST 77*   BILITOT 3.2*     Lipase: No results for input(s): "LIPASE" in the last 48 hours.      Significant Imaging:  EXAMINATION:  MRI ABDOMEN WITHOUT CONTRAST MRCP     CLINICAL HISTORY:  abnormal LFTs, distended GB - r/o distal biliary " Called and spoke to Jackeline who reports that she can see visible bone at the base of patient's wound and is draining profusely. Order placed to [facility] for more dressing supplies and stressed the importance of patient keeping her appointment tomorrow based on recent MRI results. Jackeline verbalizes understanding.    obstruction;     TECHNIQUE:  Multiplanar, multisequence images are performed through the liver and upper abdomen.  Additionally 2D and 3D MRCP sequences are performed as well as MIP images.     COMPARISON:  Ultrasound 06/28/2024, CT 06/27/2024     FINDINGS:  Bilateral small pleural effusion.     No liver lesions.  Hepatic steatosis.     The biliary ducts are not dilated.  The gallbladder is distended measuring 12.8 x 6.0 x 5.8 cm.  No filling defect to suggest cholelithiasis.  The common bile duct tapers normally.  The pancreatic duct is not dilated.     The distal esophagus and stomach demonstrate nothing unusual.     The spleen and bilateral adrenal glands appear normal.     The bilateral kidneys appear within normal limits.  Right renal cyst 1.5 cm.  Subcentimeter left renal lesion possibly cysts.     The abdominal aorta tapers normally.     Loss of height of the T11, L2 and L3 vertebrae, to correlate with most recent CT examination.     Impression:     Gallbladder distension, no definite obstructive process seen.     Hepatic steatosis.     Large amount of ascites.     Bilateral small pleural effusion.        Electronically signed by:Chantal Hodgson MD  Date:                                            07/01/2024  Time:                                           13:20    Assessment:  Nithin Wagner is a 68 y.o. male with a history of HTN, DM2, HLD, Hep B - cleared (Surface Ag neg), RA, NAFLD, prior CVA, CAD s/p PCI and diastolic CHF w/ atrial myxoma who presented with weakness, N/V, diarrhea and dehydration.  GI consulted for abnormal LFTs.       Plan:  - daily LFTs, avoid hepatotoxic meds  - no biliary obstruction on MRCP but ascites seen  - ascites studies c/w cirrhosis - SAAG 1.1 with low total protein, no SBP - may be related to MARI  - Hep B serology c/w past infection which has cleared  - ferritin high and high iron sat - heterozygote for HFE genotype, should not give him the disease (autosomal  recessive)  - EGD in near future to assess for complications of portal HTN as well as N/V and abdominal pain - can be outpatient vs. Monday if still in the hospital  - no evidence of HE, but continue to monitor for mental status changes  - optimize nutrition  - check calprotectin and elastase  - will try liquid imodium -1 mg to see if this helps with diarrhea without causing constipation  - outpatient colon to workup diarrhea and for colon cancer screening - has never had one    Doing better today clinically and per labs. Try to advance diet as tolerated. Possible EGD Monday if still here.    LEIGH ANN Singh MD

## 2024-07-06 NOTE — PLAN OF CARE
Nutrition Plan of Care:    Recommendations  1) If TPN warranted: via central line, administer Clinimix E 5/15 @ 85 mL/hr with standard daily lipids     - Provides 2040 mL total volume, 102 g protein, 306 g dextrose, 1948 kcal, GIR 2.6     - Start at half rate and titrate up to goal rate    - Monitor electrolytes and triglycerides while on TPN/IVFE       2) Monitor PO intake and tolerance       3) RD to monitor and follow up     Goals:   1) Pt able to tolerate initiation and advancement of TPN by RD follow up   2) Pt will have diet adv and able to tolerate 50-75% solid PO by RD follow up  Nutrition Goal Status: goal not met  Communication of RD Recs:  (POC)     Assessment and Plan     Endocrine  Severe protein-calorie malnutrition  Malnutrition Type:  Context: acute illness or injury, chronic illness  Level: severe     Related to (etiology):   Altered GI function     Signs and Symptoms (as evidenced by):   Inability to to keep anything down d/t N/V/D  > 10% weight loss in 6 months  Pt states he hasn't been able to eat for 2 months      Malnutrition Characteristic Summary:  Weight Loss (Malnutrition): greater than 10% in 6 months  Energy Intake (Malnutrition): less than 75% for greater than or equal to 1 month (NPO/CLD since admit 6/28)  Muscle Mass (Malnutrition): severe depletion        Interventions  (treatment strategy):  Collaboration with other providers     Nutrition Diagnosis Status:   New           Malnutrition Assessment  Malnutrition Context: acute illness or injury, chronic illness  Malnutrition Level: severe  Skin (Micronutrient): bruised, turgor reduced       Weight Loss (Malnutrition): greater than 10% in 6 months  Energy Intake (Malnutrition): less than 75% for greater than or equal to 1 month (NPO/CLD since admit 6/28)  Muscle Mass (Malnutrition): severe depletion   Orbital Region (Subcutaneous Fat Loss): well nourished  Upper Arm Region (Subcutaneous Fat Loss): severe depletion   [place] Region  (Muscle Loss): mild depletion  Clavicle Bone Region (Muscle Loss): mild depletion  Clavicle and Acromion Bone Region (Muscle Loss): moderate depletion  Patellar Region (Muscle Loss): moderate depletion  Anterior Thigh Region (Muscle Loss): severe depletion  Posterior Calf Region (Muscle Loss): severe depletion              Kiara Hernandez MS, RDN, LDN

## 2024-07-06 NOTE — CONSULTS
[facility] ([place])  Adult Nutrition  Progress Note    SUMMARY       Recommendations  1) If TPN warranted: via central line, administer Clinimix E 5/15 @ 85 mL/hr with standard daily lipids     - Provides 2040 mL total volume, 102 g protein, 306 g dextrose, 1948 kcal, GIR 2.6     - Start at half rate and titrate up to goal rate    - Monitor electrolytes and triglycerides while on TPN/IVFE      2) Monitor PO intake and tolerance      3) RD to monitor and follow up    Goals:   1) Pt able to tolerate initiation and advancement of TPN by RD follow up   2) Pt will have diet adv and able to tolerate 50-75% solid PO by RD follow up  Nutrition Goal Status: goal not met  Communication of RD Recs:  (POC)    Assessment and Plan    Endocrine  Severe protein-calorie malnutrition  Malnutrition Type:  Context: acute illness or injury, chronic illness  Level: severe    Related to (etiology):   Altered GI function    Signs and Symptoms (as evidenced by):   Inability to to keep anything down d/t N/V/D  > 10% weight loss in 6 months  Pt states he hasn't been able to eat for 2 months     Malnutrition Characteristic Summary:  Weight Loss (Malnutrition): greater than 10% in 6 months  Energy Intake (Malnutrition): less than 75% for greater than or equal to 1 month (NPO/CLD since admit 6/28)  Muscle Mass (Malnutrition): severe depletion      Interventions  (treatment strategy):  Collaboration with other providers    Nutrition Diagnosis Status:   New         Malnutrition Assessment  Malnutrition Context: acute illness or injury, chronic illness  Malnutrition Level: severe  Skin (Micronutrient): bruised, turgor reduced       Weight Loss (Malnutrition): greater than 10% in 6 months  Energy Intake (Malnutrition): less than 75% for greater than or equal to 1 month (NPO/CLD since admit 6/28)  Muscle Mass (Malnutrition): severe depletion   Orbital Region (Subcutaneous Fat Loss): well nourished  Upper Arm Region (Subcutaneous Fat Loss):  severe depletion   [place] Region (Muscle Loss): mild depletion  Clavicle Bone Region (Muscle Loss): mild depletion  Clavicle and Acromion Bone Region (Muscle Loss): moderate depletion  Patellar Region (Muscle Loss): moderate depletion  Anterior Thigh Region (Muscle Loss): severe depletion  Posterior Calf Region (Muscle Loss): severe depletion                 Reason for Assessment    Reason For Assessment: RD follow-up  Diagnosis: gastrointestinal disease (colitis)  Relevant Medical History:   Patient Active Problem List   Diagnosis    Hepatitis B core antibody positive    NAFLD (nonalcoholic fatty liver disease)    Essential hypertension    Chronic diastolic heart failure    Type 2 diabetes mellitus, with long-term current use of insulin    Class 1 obesity due to excess calories with serious comorbidity and body mass index (BMI) of 34.0 to 34.9 in adult    Tobacco use disorder    Polycythemia, secondary    Canker sores oral    Coronary artery disease    Chronic use of opiate drug for therapeutic purpose    Rheumatoid arthritis flare    Fatigue    Acute on chronic diastolic heart failure    Supraventricular tachycardia    Hyponatremia    Atherosclerotic peripheral vascular disease    Folliculitis    Rheumatoid arthritis involving multiple sites with positive rheumatoid factor    Anxiety    Long term (current) use of systemic steroids    Vitamin D deficiency    Osteoporosis    Hyperlipidemia    Severe obesity    Type 2 diabetes mellitus with other circulatory complication, with long-term current use of insulin    Nuclear sclerotic cataract of right eye    Lumbar vertebral fracture    Intractable pain    Diarrhea of presumed infectious origin    Enteritis    Nephrolithiasis    Constipation    Depression    Testicular pain, left    Fatty (change of) liver, not elsewhere classified    Colitis    SVT (supraventricular tachycardia)    Severe protein-calorie malnutrition     Interdisciplinary Rounds: did not attend (RD  remote)    General Information Comments:   2024: Patient not able to tolerate anyting PO. CLD tray at bedside, untouched. Reports if he drinks too much, he'll throw up and if he is able to keep anything, will get diarrhea. 60 lb weight loss reported to RD. Took immodium last night, no N/V this morning. Pt states he hasn't been able to keep anything down for 2 months. Midline cath. Felipe 17 - L arm, RLQ. NFPE pt meets criteria for severe malnutrition in context of acute on chronic illness r/t altered GI function aeb inability to keep anything down d/t N/V; diarrhea; weight loss; and moderate to severe fat/muscle loss.    2024: RD re-consulted for TPN recommendations.  TPN recommendations provided above. Patient does have low fiber/residue diet ordered with reduced po intake noted at 35% on 2024.  Patient deemed positive for malnutrition on initial RD assessment.  Labs reviewed.  NKFA.  LBM: 2024.  RD to continue to monitor PN and oral nutrition.     Nutrition Discharge Planning: pending medical course    Nutrition Risk Screen    Nutrition Risk Screen: no indicators present, difficulty chewing/swallowing  Nutrition Related Social Determinants of Health: SDOH: Adequate food in home environment and None Identified    Nutrition/Diet History    Spiritual, Cultural Beliefs, Jain Practices, Values that Affect Care: no  Factors Affecting Nutritional Intake: abdominal pain, altered gastrointestinal function, clear liquid diet, diarrhea, nausea/vomiting    Anthropometrics    Temp: 98 °F (36.7 °C)  Height Method: Stated  Height: 6' (182.9 cm)  Height (inches): 72 in  Weight Method: Bed Scale  Weight: 83.3 kg (183 lb 10.3 oz)  Weight (lb): 183.65 lb  Ideal Body Weight (IBW), Male: 178 lb  % Ideal Body Weight, Male (lb): 100.94 %  BMI (Calculated): 24.9  BMI Grade: 18.5-24.9 - normal  Weight Loss: unintentional  Usual Body Weight (UBW), k kg  % Usual Body Weight: 81.67  % Weight Change From Usual  Weight: -18.5 %       Lab/Procedures/Meds    Pertinent Labs Reviewed: reviewed  CBC:  Recent Labs   Lab 07/06/24 0443   WBC 4.86   HGB 12.8*   HCT 37.0*        CMP:  Recent Labs   Lab 07/06/24 0443   CALCIUM 7.3*   ALBUMIN 1.4*   PROT 4.5*   *   K 3.9   CO2 23   CL 99   BUN 5*   CREATININE 0.7   ALKPHOS 400*   ALT 60*   AST 77*   BILITOT 3.2*     BMP:   Recent Labs   Lab 07/06/24 0443   *   *   K 3.9   CL 99   CO2 23   BUN 5*   CREATININE 0.7   CALCIUM 7.3*   MG 1.7       Pertinent Medications Reviewed: reviewed  Scheduled Meds:   digoxin  0.125 mg Oral Daily    EScitalopram oxalate  20 mg Oral QHS    fat emulsion 20%  250 mL Intravenous Daily    [START ON 7/7/2024] fat emulsion 20%  250 mL Intravenous Daily    lactobacillus acidophilus & bulgar  1 packet Oral TID WM    lamiVUDine  150 mg Oral QHS    LIDOcaine  1 patch Transdermal Q24H    magnesium sulfate IVPB  2 g Intravenous Once    metoclopramide HCl  10 mg Oral TID AC    ondansetron  4 mg Intravenous Q6H    pantoprazole  40 mg Intravenous Daily    sodium chloride 0.9%  10 mL Intravenous Q6H     Continuous Infusions:   Amino acid 4.25% - dextrose 5% (CLINIMIX-E) solution with additives (1L provides 42.5 gm AA, 50 gm CHO (170 kcal/L dextrose), Na 35, K 30, Mg 5, Ca 4.5, Acetate 70, Cl 39, Phos 15)   Intravenous Continuous 85 mL/hr at 07/05/24 2046 New Bag at 07/05/24 2046    Amino acid 4.25% - dextrose 5% (CLINIMIX-E) solution with additives (1L provides 42.5 gm AA, 50 gm CHO (170 kcal/L dextrose), Na 35, K 30, Mg 5, Ca 4.5, Acetate 70, Cl 39, Phos 15)   Intravenous Continuous         PRN Meds:.  Current Facility-Administered Medications:     acetaminophen, 650 mg, Oral, Q8H PRN    dextrose 10%, 12.5 g, Intravenous, PRN    dextrose 10%, 25 g, Intravenous, PRN    glucagon (human recombinant), 1 mg, Intramuscular, PRN    glucose, 16 g, Oral, PRN    glucose, 24 g, Oral, PRN    hydrOXYzine HCL, 50 mg, Oral, QID PRN    insulin aspart  U-100, 0-5 Units, Subcutaneous, QID (AC + HS) PRN    loperamide, 1 mg, Oral, QID PRN    melatonin, 6 mg, Oral, Nightly PRN    naloxone, 0.02 mg, Intravenous, PRN    oxyCODONE-acetaminophen, 1 tablet, Oral, Q4H PRN    prochlorperazine, 5 mg, Intravenous, Q6H PRN    Flushing PICC/Midline Protocol, , , Until Discontinued **AND** sodium chloride 0.9%, 10 mL, Intravenous, Q6H **AND** sodium chloride 0.9%, 10 mL, Intravenous, PRN    Estimated/Assessed Needs    Weight Used For Calorie Calculations: 81.5 kg (179 lb 10.8 oz)  Energy Calorie Requirements (kcal): 2037-2445  Energy Need Method: Kcal/kg (25-30)  Protein Requirements:  g (1.2-1.5 g/kg during colitis flare)  Weight Used For Protein Calculations: 81.5 kg (179 lb 10.8 oz)  Fluid Requirements (mL): 1 mL/kcal  Estimated Fluid Requirement Method:  (or per MD)  RDA Method (mL): 2037  CHO Requirement: 250 g (50% EEN)      Nutrition Prescription Ordered    Current Diet Order: low residue/fiber    Evaluation of Received Nutrient/Fluid Intake    % Kcal Needs: 25%  % Protein Needs: 25%  I/O:   + 6.9 L since admit    Energy Calories Required: not meeting needs  Protein Required: not meeting needs  Fluid Required: not meeting needs  Comments: LBM: 7/5  Tolerance: not tolerating  % Intake of Estimated Energy Needs: 0 - 25 %  % Meal Intake: 0 - 25 %    Nutrition Risk    Level of Risk/Frequency of Follow-up: moderate     Monitor and Evaluation    Food and Nutrient Intake: energy intake, food and beverage intake, parenteral nutrition intake  Food and Nutrient Adminstration: diet order, enteral and parenteral nutrition administration  Knowledge/Beliefs/Attitudes: food and nutrition knowledge/skill, beliefs and attitudes  Physical Activity and Function: nutrition-related ADLs and IADLs  Anthropometric Measurements: weight, weight change  Biochemical Data, Medical Tests and Procedures: electrolyte and renal panel, gastrointestinal profile, glucose/endocrine profile,  inflammatory profile, lipid profile     Nutrition Follow-Up    RD Follow-up?: Yes  Kiara Hernandez, MS, RDN, LDN

## 2024-07-06 NOTE — ASSESSMENT & PLAN NOTE
- Recent Echo performed 04/03/2024 showed EF 55-60%.  Patient states he no longer takes Lasix.  Lower extremity edema noted, patient denies shortness of breath  - Clinically appears near euvolemic though with anasarca due to liver disease / hypoalbuminemia.

## 2024-07-06 NOTE — SUBJECTIVE & OBJECTIVE
Interval History: No acute events overnight. Mild abdominal pain but appetite remains poor. Added metoclopramide, ondansetron and PPN. Discussed plan of care. No other concerns at this time.    Review of Systems   Constitutional:  Negative for chills and fever.   Respiratory:  Negative for cough and shortness of breath.    Cardiovascular:  Negative for chest pain and palpitations.   Gastrointestinal:  Positive for abdominal pain. Negative for nausea and vomiting.     Objective:     Vital Signs (Most Recent):  Temp: 98.3 °F (36.8 °C) (07/06/24 1647)  Pulse: 88 (07/06/24 1647)  Resp: 18 (07/06/24 1647)  BP: 118/72 (07/06/24 1647)  SpO2: 95 % (07/06/24 1647) Vital Signs (24h Range):  Temp:  [97.7 °F (36.5 °C)-98.3 °F (36.8 °C)] 98.3 °F (36.8 °C)  Pulse:  [83-94] 88  Resp:  [16-18] 18  SpO2:  [92 %-95 %] 95 %  BP: (109-131)/(69-82) 118/72     Weight: 83.3 kg (183 lb 10.3 oz)  Body mass index is 24.91 kg/m².    Intake/Output Summary (Last 24 hours) at 7/6/2024 1803  Last data filed at 7/6/2024 1433  Gross per 24 hour   Intake 954.18 ml   Output 975 ml   Net -20.82 ml         Physical Exam  Vitals and nursing note reviewed.   Constitutional:       General: He is not in acute distress.     Appearance: He is well-developed.   HENT:      Head: Normocephalic and atraumatic.   Eyes:      General:         Right eye: No discharge.         Left eye: No discharge.      Conjunctiva/sclera: Conjunctivae normal.   Cardiovascular:      Rate and Rhythm: Normal rate.      Pulses: Normal pulses.   Pulmonary:      Effort: Pulmonary effort is normal. No respiratory distress.   Abdominal:      General: There is distension.      Palpations: Abdomen is soft.      Tenderness: There is abdominal tenderness.   Musculoskeletal:         General: Normal range of motion.      Right lower leg: Edema present.      Left lower leg: Edema present.   Skin:     General: Skin is warm and dry.   Neurological:      Mental Status: He is alert and oriented to  person, place, and time.           Significant Labs:   CBC:  Recent Labs   Lab 07/04/24  0525 07/05/24  0533 07/06/24  0443   WBC 5.49 6.17 4.86   HGB 11.6* 12.8* 12.8*   HCT 33.9* 36.8* 37.0*    202 198   GRAN 63.1  3.5 63.3  3.9 64.2  3.1   LYMPH 24.2  1.3 23.8  1.5 22.8  1.1   MONO 9.3  0.5 10.0  0.6 9.3  0.5   EOS 0.1 0.1 0.1   BASO 0.05 0.05 0.06   CMP:  Recent Labs   Lab 07/04/24 0525 07/05/24  0533 07/06/24  0443   * 132* 130*   K 3.7 3.9 3.9    101 99   CO2 24 23 23   BUN 4* 4* 5*   CREATININE 0.6 0.6 0.7   GLU 95 70 132*   CALCIUM 7.0* 7.2* 7.3*   MG 1.7 1.7 1.7   PHOS 2.6* 3.3 3.1   ALKPHOS 398* 418* 400*   AST 95* 92* 77*   ALT 66* 66* 60*   BILITOT 3.3* 3.4* 3.2*   PROT 4.1* 4.5* 4.5*   ALBUMIN 1.3* 1.4* 1.4*   ANIONGAP 4* 8 8      Significant Imaging: I have reviewed and interpreted all pertinent imaging results/findings within the past 24 hours.

## 2024-07-07 PROBLEM — R79.89 ABNORMAL LFTS (LIVER FUNCTION TESTS): Status: ACTIVE | Noted: 2024-07-07

## 2024-07-07 LAB
ALBUMIN SERPL BCP-MCNC: 1.3 G/DL (ref 3.5–5.2)
ALP SERPL-CCNC: 412 U/L (ref 55–135)
ALT SERPL W/O P-5'-P-CCNC: 51 U/L (ref 10–44)
ANION GAP SERPL CALC-SCNC: 6 MMOL/L (ref 8–16)
AST SERPL-CCNC: 62 U/L (ref 10–40)
BACTERIA FLD CULT: NORMAL
BASOPHILS # BLD AUTO: 0.06 K/UL (ref 0–0.2)
BASOPHILS NFR BLD: 1.3 % (ref 0–1.9)
BILIRUB SERPL-MCNC: 2.7 MG/DL (ref 0.1–1)
BUN SERPL-MCNC: 5 MG/DL (ref 8–23)
CALCIUM SERPL-MCNC: 7.3 MG/DL (ref 8.7–10.5)
CHLORIDE SERPL-SCNC: 101 MMOL/L (ref 95–110)
CO2 SERPL-SCNC: 23 MMOL/L (ref 23–29)
CREAT SERPL-MCNC: 0.6 MG/DL (ref 0.5–1.4)
DIFFERENTIAL METHOD BLD: ABNORMAL
EOSINOPHIL # BLD AUTO: 0.1 K/UL (ref 0–0.5)
EOSINOPHIL NFR BLD: 2 % (ref 0–8)
ERYTHROCYTE [DISTWIDTH] IN BLOOD BY AUTOMATED COUNT: 16.1 % (ref 11.5–14.5)
EST. GFR  (NO RACE VARIABLE): >60 ML/MIN/1.73 M^2
GLUCOSE SERPL-MCNC: 164 MG/DL (ref 70–110)
HCT VFR BLD AUTO: 34.6 % (ref 40–54)
HGB BLD-MCNC: 12.1 G/DL (ref 14–18)
IMM GRANULOCYTES # BLD AUTO: 0.03 K/UL (ref 0–0.04)
IMM GRANULOCYTES NFR BLD AUTO: 0.7 % (ref 0–0.5)
LYMPHOCYTES # BLD AUTO: 1.3 K/UL (ref 1–4.8)
LYMPHOCYTES NFR BLD: 29.5 % (ref 18–48)
MAGNESIUM SERPL-MCNC: 1.9 MG/DL (ref 1.6–2.6)
MCH RBC QN AUTO: 30.8 PG (ref 27–31)
MCHC RBC AUTO-ENTMCNC: 35 G/DL (ref 32–36)
MCV RBC AUTO: 88 FL (ref 82–98)
MONOCYTES # BLD AUTO: 0.6 K/UL (ref 0.3–1)
MONOCYTES NFR BLD: 12.6 % (ref 4–15)
NEUTROPHILS # BLD AUTO: 2.5 K/UL (ref 1.8–7.7)
NEUTROPHILS NFR BLD: 53.9 % (ref 38–73)
NRBC BLD-RTO: 0 /100 WBC
PHOSPHATE SERPL-MCNC: 2.6 MG/DL (ref 2.7–4.5)
PLATELET # BLD AUTO: 162 K/UL (ref 150–450)
PMV BLD AUTO: 11.4 FL (ref 9.2–12.9)
POCT GLUCOSE: 174 MG/DL (ref 70–110)
POCT GLUCOSE: 201 MG/DL (ref 70–110)
POCT GLUCOSE: 211 MG/DL (ref 70–110)
POCT GLUCOSE: 214 MG/DL (ref 70–110)
POTASSIUM SERPL-SCNC: 4.1 MMOL/L (ref 3.5–5.1)
PROT SERPL-MCNC: 4.4 G/DL (ref 6–8.4)
RBC # BLD AUTO: 3.93 M/UL (ref 4.6–6.2)
SODIUM SERPL-SCNC: 130 MMOL/L (ref 136–145)
WBC # BLD AUTO: 4.54 K/UL (ref 3.9–12.7)

## 2024-07-07 PROCEDURE — 63600175 PHARM REV CODE 636 W HCPCS: Performed by: INTERNAL MEDICINE

## 2024-07-07 PROCEDURE — 85025 COMPLETE CBC W/AUTO DIFF WBC: CPT | Performed by: HOSPITALIST

## 2024-07-07 PROCEDURE — 94761 N-INVAS EAR/PLS OXIMETRY MLT: CPT

## 2024-07-07 PROCEDURE — B4185 PARENTERAL SOL 10 GM LIPIDS: HCPCS | Performed by: INTERNAL MEDICINE

## 2024-07-07 PROCEDURE — 25000003 PHARM REV CODE 250: Performed by: HOSPITALIST

## 2024-07-07 PROCEDURE — 83735 ASSAY OF MAGNESIUM: CPT | Performed by: HOSPITALIST

## 2024-07-07 PROCEDURE — 25000003 PHARM REV CODE 250: Performed by: NURSE PRACTITIONER

## 2024-07-07 PROCEDURE — 36415 COLL VENOUS BLD VENIPUNCTURE: CPT | Performed by: HOSPITALIST

## 2024-07-07 PROCEDURE — C1751 CATH, INF, PER/CENT/MIDLINE: HCPCS

## 2024-07-07 PROCEDURE — 36410 VNPNXR 3YR/> PHY/QHP DX/THER: CPT

## 2024-07-07 PROCEDURE — A4216 STERILE WATER/SALINE, 10 ML: HCPCS | Performed by: HOSPITALIST

## 2024-07-07 PROCEDURE — 84100 ASSAY OF PHOSPHORUS: CPT | Performed by: HOSPITALIST

## 2024-07-07 PROCEDURE — 25000003 PHARM REV CODE 250: Performed by: INTERNAL MEDICINE

## 2024-07-07 PROCEDURE — 80053 COMPREHEN METABOLIC PANEL: CPT | Performed by: HOSPITALIST

## 2024-07-07 PROCEDURE — 11000001 HC ACUTE MED/SURG PRIVATE ROOM

## 2024-07-07 RX ADMIN — OXYCODONE AND ACETAMINOPHEN 1 TABLET: 10; 325 TABLET ORAL at 02:07

## 2024-07-07 RX ADMIN — Medication 1 EACH: at 11:07

## 2024-07-07 RX ADMIN — PANTOPRAZOLE SODIUM 40 MG: 40 INJECTION, POWDER, LYOPHILIZED, FOR SOLUTION INTRAVENOUS at 08:07

## 2024-07-07 RX ADMIN — ONDANSETRON 4 MG: 2 INJECTION INTRAMUSCULAR; INTRAVENOUS at 11:07

## 2024-07-07 RX ADMIN — ONDANSETRON 4 MG: 2 INJECTION INTRAMUSCULAR; INTRAVENOUS at 05:07

## 2024-07-07 RX ADMIN — SODIUM CHLORIDE, PRESERVATIVE FREE 10 ML: 5 INJECTION INTRAVENOUS at 05:07

## 2024-07-07 RX ADMIN — OXYCODONE AND ACETAMINOPHEN 1 TABLET: 10; 325 TABLET ORAL at 08:07

## 2024-07-07 RX ADMIN — INSULIN ASPART 2 UNITS: 100 INJECTION, SOLUTION INTRAVENOUS; SUBCUTANEOUS at 05:07

## 2024-07-07 RX ADMIN — ASCORBIC ACID, VITAMIN A PALMITATE, CHOLECALCIFEROL, THIAMINE HYDROCHLORIDE, RIBOFLAVIN-5 PHOSPHATE SODIUM, PYRIDOXINE HYDROCHLORIDE, NIACINAMIDE, DEXPANTHENOL, ALPHA-TOCOPHEROL ACETATE, VITAMIN K1, FOLIC ACID, BIOTIN, CYANOCOBALAMIN: 200; 3300; 200; 6; 3.6; 6; 40; 15; 10; 150; 600; 60; 5 INJECTION, SOLUTION INTRAVENOUS at 09:07

## 2024-07-07 RX ADMIN — ESCITALOPRAM OXALATE 20 MG: 10 TABLET ORAL at 08:07

## 2024-07-07 RX ADMIN — SODIUM CHLORIDE, PRESERVATIVE FREE 10 ML: 5 INJECTION INTRAVENOUS at 12:07

## 2024-07-07 RX ADMIN — METOCLOPRAMIDE 10 MG: 10 TABLET ORAL at 06:07

## 2024-07-07 RX ADMIN — METOCLOPRAMIDE 10 MG: 10 TABLET ORAL at 11:07

## 2024-07-07 RX ADMIN — INSULIN ASPART 1 UNITS: 100 INJECTION, SOLUTION INTRAVENOUS; SUBCUTANEOUS at 09:07

## 2024-07-07 RX ADMIN — SODIUM CHLORIDE, PRESERVATIVE FREE 10 ML: 5 INJECTION INTRAVENOUS at 11:07

## 2024-07-07 RX ADMIN — Medication 1 EACH: at 08:07

## 2024-07-07 RX ADMIN — OXYCODONE AND ACETAMINOPHEN 1 TABLET: 10; 325 TABLET ORAL at 12:07

## 2024-07-07 RX ADMIN — LAMIVUDINE 150 MG: 150 TABLET, FILM COATED ORAL at 08:07

## 2024-07-07 RX ADMIN — OXYCODONE AND ACETAMINOPHEN 1 TABLET: 10; 325 TABLET ORAL at 04:07

## 2024-07-07 RX ADMIN — LIDOCAINE 1 PATCH: 50 PATCH CUTANEOUS at 09:07

## 2024-07-07 RX ADMIN — DIGOXIN 0.12 MG: 125 TABLET ORAL at 08:07

## 2024-07-07 RX ADMIN — I.V. FAT EMULSION 250 ML: 20 EMULSION INTRAVENOUS at 11:07

## 2024-07-07 RX ADMIN — METOCLOPRAMIDE 10 MG: 10 TABLET ORAL at 05:07

## 2024-07-07 RX ADMIN — Medication 1 EACH: at 05:07

## 2024-07-07 RX ADMIN — SODIUM CHLORIDE, PRESERVATIVE FREE 10 ML: 5 INJECTION INTRAVENOUS at 06:07

## 2024-07-07 RX ADMIN — INSULIN ASPART 2 UNITS: 100 INJECTION, SOLUTION INTRAVENOUS; SUBCUTANEOUS at 08:07

## 2024-07-07 NOTE — PLAN OF CARE
Clinimix continue.New midline inserted by IV nurse.bleeding from the insertion site noted,dressing changed.Needed care provided.Safety measures assessed and maintained.Purposeful rounding done.No any specific event occurred during the shift.  Problem: Adult Inpatient Plan of Care  Goal: Plan of Care Review  Outcome: Progressing  Goal: Patient-Specific Goal (Individualized)  Outcome: Progressing  Goal: Absence of Hospital-Acquired Illness or Injury  Outcome: Progressing  Goal: Optimal Comfort and Wellbeing  Outcome: Progressing  Goal: Readiness for Transition of Care  Outcome: Progressing     Problem: Diabetes Comorbidity  Goal: Blood Glucose Level Within Targeted Range  Outcome: Progressing     Problem: Wound  Goal: Optimal Coping  Outcome: Progressing  Goal: Optimal Functional Ability  Outcome: Progressing  Goal: Absence of Infection Signs and Symptoms  Outcome: Progressing  Goal: Improved Oral Intake  Outcome: Progressing  Goal: Optimal Pain Control and Function  Outcome: Progressing  Goal: Skin Health and Integrity  Outcome: Progressing  Goal: Optimal Wound Healing  Outcome: Progressing     Problem: Infection  Goal: Absence of Infection Signs and Symptoms  Outcome: Progressing     Problem: Skin Injury Risk Increased  Goal: Skin Health and Integrity  Outcome: Progressing     Problem: Nausea and Vomiting  Goal: Nausea and Vomiting Relief  Outcome: Progressing

## 2024-07-07 NOTE — ASSESSMENT & PLAN NOTE
Abnormal LFTs  Hypokalemia  - Patient with intermittent nausea, vomiting and diarrhea.  Reports subjective fever.  CT abdomen with findings concerning for colitis.   - Completed 7 days of ciprofloxacin/metronidazole.  - Liver function tests with some improvement, cholestatic pattern initially.  - Patient with some clinical improvement in symptoms and stool studies negative for C.diff  - MRCP was negative for obstruction, noted gallbladder distention and large amount of ascites  - Case discussed with GI; paracentesis 07/02 demonstrating SAAG 1.1. NITIN, anti-mitochondrial, ASMA negative.  - Replete lytes as required. Monitor PO intake. Continue ondansetron, metoclopramide, continue PPN.  - To EGD tomorrow AM to evaluate further.  - Appreciate GI assistance.

## 2024-07-07 NOTE — SUBJECTIVE & OBJECTIVE
"Interval History: No acute events overnight. Reports feeling weak and mild pain though denies specific symptoms impacting his ability to take PO - just "feel[s] full easily".    Review of Systems   Constitutional:  Negative for chills and fever.   Respiratory:  Negative for cough and shortness of breath.    Cardiovascular:  Negative for chest pain and palpitations.   Gastrointestinal:  Positive for abdominal pain. Negative for nausea and vomiting.     Objective:     Vital Signs (Most Recent):  Temp: 97 °F (36.1 °C) (07/07/24 1641)  Pulse: 102 (07/07/24 1641)  Resp: 18 (07/07/24 1641)  BP: 113/73 (07/07/24 1641)  SpO2: (!) 94 % (07/07/24 1641) Vital Signs (24h Range):  Temp:  [97 °F (36.1 °C)-98.4 °F (36.9 °C)] 97 °F (36.1 °C)  Pulse:  [] 102  Resp:  [16-20] 18  SpO2:  [92 %-95 %] 94 %  BP: (104-129)/(65-78) 113/73     Weight: 83.1 kg (183 lb 3.2 oz)  Body mass index is 24.85 kg/m².    Intake/Output Summary (Last 24 hours) at 7/7/2024 1739  Last data filed at 7/7/2024 1722  Gross per 24 hour   Intake 3148.65 ml   Output 2450 ml   Net 698.65 ml         Physical Exam  Vitals and nursing note reviewed.   Constitutional:       General: He is not in acute distress.     Appearance: He is well-developed.   HENT:      Head: Normocephalic and atraumatic.   Eyes:      General:         Right eye: No discharge.         Left eye: No discharge.      Conjunctiva/sclera: Conjunctivae normal.   Cardiovascular:      Rate and Rhythm: Normal rate.      Pulses: Normal pulses.   Pulmonary:      Effort: Pulmonary effort is normal. No respiratory distress.   Abdominal:      General: There is distension.      Palpations: Abdomen is soft.      Tenderness: There is abdominal tenderness.   Musculoskeletal:         General: Normal range of motion.      Right lower leg: Edema present.      Left lower leg: Edema present.   Skin:     General: Skin is warm and dry.   Neurological:      Mental Status: He is alert and oriented to person, place, " and time.           Significant Labs:   CBC:  Recent Labs   Lab 07/05/24  0533 07/06/24  0443 07/07/24 0449   WBC 6.17 4.86 4.54   HGB 12.8* 12.8* 12.1*   HCT 36.8* 37.0* 34.6*    198 162   GRAN 63.3  3.9 64.2  3.1 53.9  2.5   LYMPH 23.8  1.5 22.8  1.1 29.5  1.3   MONO 10.0  0.6 9.3  0.5 12.6  0.6   EOS 0.1 0.1 0.1   BASO 0.05 0.06 0.06   CMP:  Recent Labs   Lab 07/05/24  0533 07/06/24 0443 07/07/24 0449   * 130* 130*   K 3.9 3.9 4.1    99 101   CO2 23 23 23   BUN 4* 5* 5*   CREATININE 0.6 0.7 0.6   GLU 70 132* 164*   CALCIUM 7.2* 7.3* 7.3*   MG 1.7 1.7 1.9   PHOS 3.3 3.1 2.6*   ALKPHOS 418* 400* 412*   AST 92* 77* 62*   ALT 66* 60* 51*   BILITOT 3.4* 3.2* 2.7*   PROT 4.5* 4.5* 4.4*   ALBUMIN 1.4* 1.4* 1.3*   ANIONGAP 8 8 6*      Significant Imaging: I have reviewed and interpreted all pertinent imaging results/findings within the past 24 hours.

## 2024-07-07 NOTE — PROGRESS NOTES
"[facility] Medicine  Progress Note    Patient Name: Nithin Wagner  MRN: [id number]  Patient Class: IP- Inpatient   Admission Date: 6/28/2024  Length of Stay: 8 days  Attending Physician: CARISA Lozano MD  Primary Care Provider: Tushar Drew MD        Subjective:     Principal Problem:Colitis        HPI:  Nithin Wagner is a 68 y.o. male with a past medical history of hepatitis-B, hypertension, diastolic heart failure, diabetes, rheumatoid arthritis, hyponatremia, s/p s/p T11 and L3 kyphoplasty on 4/22/2024 who presents to the ED with lower abdominal pain recurrent onset 4 days ago.  Patient reports associated nausea and vomiting and states he has been experiencing intermittent diarrhea over the past several months.  He denies blood in stool.  Patient  was seen here on i 06/26/2024 for emesis and weakness. Workup showed findings suggestive of colitis and dehydration. He was offered admission at that time but he stated he would prefer to go home. Stated he went home and was unable to hold down significant fluids and came back.  Reports subjective fever a few days ago.  Had a normal bowel movement this morning     ED workup significant for unremarkable CBC, mild hyponatremia 132, T bili 4.1, albumin 1.6,  and .  Lactic acid 1.2.  CT abdomen performed 06/26/2024 showed diffuse colitis.  Patient was started on IV Cipro and Flagyl in the ED and referred to Hospital Medicine.  Patient will be admitted for further evaluation and management.    Overview/Hospital Course:  Mr. Wagner presented with abdominal pain with nausea and vomiting.  Placed in observation status.  Treatment initiated for colitis with IV Cipro and Flagyl, IV fluids, antiemetics and supportive care.  GI consulted.    Interval History: No acute events overnight. Reports feeling weak and mild pain though denies specific symptoms impacting his ability to take PO - just "feel[s] full easily".    Review of Systems "   Constitutional:  Negative for chills and fever.   Respiratory:  Negative for cough and shortness of breath.    Cardiovascular:  Negative for chest pain and palpitations.   Gastrointestinal:  Positive for abdominal pain. Negative for nausea and vomiting.     Objective:     Vital Signs (Most Recent):  Temp: 97 °F (36.1 °C) (07/07/24 1641)  Pulse: 102 (07/07/24 1641)  Resp: 18 (07/07/24 1641)  BP: 113/73 (07/07/24 1641)  SpO2: (!) 94 % (07/07/24 1641) Vital Signs (24h Range):  Temp:  [97 °F (36.1 °C)-98.4 °F (36.9 °C)] 97 °F (36.1 °C)  Pulse:  [] 102  Resp:  [16-20] 18  SpO2:  [92 %-95 %] 94 %  BP: (104-129)/(65-78) 113/73     Weight: 83.1 kg (183 lb 3.2 oz)  Body mass index is 24.85 kg/m².    Intake/Output Summary (Last 24 hours) at 7/7/2024 1739  Last data filed at 7/7/2024 1722  Gross per 24 hour   Intake 3148.65 ml   Output 2450 ml   Net 698.65 ml         Physical Exam  Vitals and nursing note reviewed.   Constitutional:       General: He is not in acute distress.     Appearance: He is well-developed.   HENT:      Head: Normocephalic and atraumatic.   Eyes:      General:         Right eye: No discharge.         Left eye: No discharge.      Conjunctiva/sclera: Conjunctivae normal.   Cardiovascular:      Rate and Rhythm: Normal rate.      Pulses: Normal pulses.   Pulmonary:      Effort: Pulmonary effort is normal. No respiratory distress.   Abdominal:      General: There is distension.      Palpations: Abdomen is soft.      Tenderness: There is abdominal tenderness.   Musculoskeletal:         General: Normal range of motion.      Right lower leg: Edema present.      Left lower leg: Edema present.   Skin:     General: Skin is warm and dry.   Neurological:      Mental Status: He is alert and oriented to person, place, and time.           Significant Labs:   CBC:  Recent Labs   Lab 07/05/24  0533 07/06/24  0443 07/07/24  0449   WBC 6.17 4.86 4.54   HGB 12.8* 12.8* 12.1*   HCT 36.8* 37.0* 34.6*    198 162    GRAN 63.3  3.9 64.2  3.1 53.9  2.5   LYMPH 23.8  1.5 22.8  1.1 29.5  1.3   MONO 10.0  0.6 9.3  0.5 12.6  0.6   EOS 0.1 0.1 0.1   BASO 0.05 0.06 0.06   CMP:  Recent Labs   Lab 07/05/24  0533 07/06/24  0443 07/07/24  0449   * 130* 130*   K 3.9 3.9 4.1    99 101   CO2 23 23 23   BUN 4* 5* 5*   CREATININE 0.6 0.7 0.6   GLU 70 132* 164*   CALCIUM 7.2* 7.3* 7.3*   MG 1.7 1.7 1.9   PHOS 3.3 3.1 2.6*   ALKPHOS 418* 400* 412*   AST 92* 77* 62*   ALT 66* 60* 51*   BILITOT 3.4* 3.2* 2.7*   PROT 4.5* 4.5* 4.4*   ALBUMIN 1.4* 1.4* 1.3*   ANIONGAP 8 8 6*      Significant Imaging: I have reviewed and interpreted all pertinent imaging results/findings within the past 24 hours.      Assessment/Plan:      * Colitis  Abnormal LFTs  Hypokalemia  - Patient with intermittent nausea, vomiting and diarrhea.  Reports subjective fever.  CT abdomen with findings concerning for colitis.   - Completed 7 days of ciprofloxacin/metronidazole.  - Liver function tests with some improvement, cholestatic pattern initially.  - Patient with some clinical improvement in symptoms and stool studies negative for C.diff  - MRCP was negative for obstruction, noted gallbladder distention and large amount of ascites  - Case discussed with GI; paracentesis 07/02 demonstrating SAAG 1.1. NITIN, anti-mitochondrial, ASMA negative.  - Replete lytes as required. Monitor PO intake. Continue ondansetron, metoclopramide, continue PPN.  - To EGD tomorrow AM to evaluate further.  - Appreciate GI assistance.    SVT (supraventricular tachycardia)  - History SVT noted.  Followed by Cardiology, Dr. Doshi, outpatient.  States he takes digoxin and has had no recent episodes of SVT  - Continue digoxin 0.125 mg p.o. daily  - Monitor on telemetry    Type 2 diabetes mellitus, with long-term current use of insulin  - Recent HgbA1c 7.4 performed on 3/2024.  Patient has not been taking insulin and was on tirzepeptide and stopped taking in April  - Glucose level  reviewed, in normal to slightly elevated range  - Continue monitor with Accu-Cheks q.a.c. and q.h.s. and give low-dose sliding scale insulin as necessary    Chronic diastolic heart failure  - Recent Echo performed 04/03/2024 showed EF 55-60%.  Patient states he no longer takes Lasix.  Lower extremity edema noted, patient denies shortness of breath  - Clinically appears near euvolemic though with anasarca due to liver disease / hypoalbuminemia.    NAFLD (nonalcoholic fatty liver disease)  - History noted.  LFTs on admission elevated from baseline with , T bili 4.1,  and .  - As discussed above    Hepatitis B core antibody positive  - Patient states he was exposed to hep B while working in [place] in the 1990s and is on prophylaxis with lamivudine   - Continue lamivudine daily      VTE Risk Mitigation (From admission, onward)           Ordered     Place CHELE hose  Until discontinued         07/03/24 2056     IP VTE HIGH RISK PATIENT  Once         06/28/24 2107     Place sequential compression device  Until discontinued         06/28/24 2107                    Discharge Planning   ALEXANDER:      Code Status: Full Code   Is the patient medically ready for discharge?:     Reason for patient still in hospital (select all that apply): Treatment  Discharge Plan A: Home Health   Discharge Delays: None known at this time              COURTNEY Lozano MD  Department of Hospital Medicine   [facility] - Med Surg ([place])

## 2024-07-07 NOTE — PLAN OF CARE
Problem: Adult Inpatient Plan of Care  Goal: Plan of Care Review  Outcome: Progressing  Goal: Patient-Specific Goal (Individualized)  Outcome: Progressing  Goal: Absence of Hospital-Acquired Illness or Injury  Outcome: Progressing  Goal: Optimal Comfort and Wellbeing  Outcome: Progressing     Problem: Diabetes Comorbidity  Goal: Blood Glucose Level Within Targeted Range  Outcome: Progressing     Problem: Wound  Goal: Optimal Coping  Outcome: Progressing  Goal: Optimal Functional Ability  Outcome: Progressing  Goal: Absence of Infection Signs and Symptoms  Outcome: Progressing  Goal: Improved Oral Intake  Outcome: Progressing  Goal: Optimal Pain Control and Function  Outcome: Progressing  Goal: Skin Health and Integrity  Outcome: Progressing  Goal: Optimal Wound Healing  Outcome: Progressing

## 2024-07-07 NOTE — PROGRESS NOTES
[facility] ([place])  Gastroenterology  Progress Note    Patient Name: Nithin Wagner  MRN: [id number]  Admission Date: 6/28/2024  Hospital Length of Stay: 8 days  Code Status: Full Code   Attending Provider: CARISA Lozano MD  Consulting Provider: Kevin Singh MD  Primary Care Physician: Tushar Drew MD  Principal Problem: Colitis        Subjective:     Interval History: He complains of feeling weak today. No further vomiting or diarrhea.    Review of Systems  Objective:     Vital Signs (Most Recent):  Temp: 98 °F (36.7 °C) (07/07/24 1147)  Pulse: 96 (07/07/24 1147)  Resp: 16 (07/07/24 1439)  BP: 129/78 (07/07/24 1147)  SpO2: 95 % (07/07/24 1147) Vital Signs (24h Range):  Temp:  [97.7 °F (36.5 °C)-98.4 °F (36.9 °C)] 98 °F (36.7 °C)  Pulse:  [88-98] 96  Resp:  [16-20] 16  SpO2:  [92 %-95 %] 95 %  BP: (104-129)/(65-78) 129/78     Weight: 83.1 kg (183 lb 3.2 oz) (07/07/24 0430)  Body mass index is 24.85 kg/m².      Intake/Output Summary (Last 24 hours) at 7/7/2024 1538  Last data filed at 7/7/2024 1413  Gross per 24 hour   Intake 3078.65 ml   Output 2050 ml   Net 1028.65 ml       Lines/Drains/Airways       Peripheral Intravenous Line  Duration                  Peripheral IV - Single Lumen 07/06/24 0845 20 G Right Antecubital 1 day         Midline Catheter - Single Lumen 07/07/24 0918 Left basilic vein (medial side of arm)  <1 day                     Physical Exam  Constitutional:       General: He is not in acute distress.     Appearance: Normal appearance. He is not ill-appearing, toxic-appearing or diaphoretic.   Eyes:      General: No scleral icterus.  Pulmonary:      Effort: Pulmonary effort is normal.   Neurological:      Mental Status: He is alert.          Significant Labs:  CBC:   Recent Labs   Lab 07/06/24  0443 07/07/24  0449   WBC 4.86 4.54   HGB 12.8* 12.1*   HCT 37.0* 34.6*    162     CMP:   Recent Labs   Lab 07/07/24  0449   *   CALCIUM 7.3*   ALBUMIN 1.3*   PROT 4.4*    *   K 4.1   CO2 23      BUN 5*   CREATININE 0.6   ALKPHOS 412*   ALT 51*   AST 62*   BILITOT 2.7*         Significant Imaging:    Assessment:  Nithin Wagner is a 68 y.o. male with a history of HTN, DM2, HLD, Hep B - cleared (Surface Ag neg), RA, NAFLD, prior CVA, CAD s/p PCI and diastolic CHF w/ atrial myxoma who presented with weakness, N/V, diarrhea and dehydration.  GI consulted for abnormal LFTs.  Feeling weak today.     Plan:    EGD tomorrow  I explained to patient the benefits, risks, indications, and alternatives to EGD.  He wishes to proceed.  Assessment/Plan:     Weakness today. Transaminases and bilirubin improving.          Kevin Singh MD  Gastroenterology  [facility] ([place])

## 2024-07-07 NOTE — SUBJECTIVE & OBJECTIVE
Subjective:     Interval History: He complains of feeling weak today. No further vomiting or diarrhea.    Review of Systems  Objective:     Vital Signs (Most Recent):  Temp: 98 °F (36.7 °C) (07/07/24 1147)  Pulse: 96 (07/07/24 1147)  Resp: 16 (07/07/24 1439)  BP: 129/78 (07/07/24 1147)  SpO2: 95 % (07/07/24 1147) Vital Signs (24h Range):  Temp:  [97.7 °F (36.5 °C)-98.4 °F (36.9 °C)] 98 °F (36.7 °C)  Pulse:  [88-98] 96  Resp:  [16-20] 16  SpO2:  [92 %-95 %] 95 %  BP: (104-129)/(65-78) 129/78     Weight: 83.1 kg (183 lb 3.2 oz) (07/07/24 0430)  Body mass index is 24.85 kg/m².      Intake/Output Summary (Last 24 hours) at 7/7/2024 1538  Last data filed at 7/7/2024 1413  Gross per 24 hour   Intake 3078.65 ml   Output 2050 ml   Net 1028.65 ml       Lines/Drains/Airways       Peripheral Intravenous Line  Duration                  Peripheral IV - Single Lumen 07/06/24 0845 20 G Right Antecubital 1 day         Midline Catheter - Single Lumen 07/07/24 0918 Left basilic vein (medial side of arm)  <1 day                     Physical Exam  Constitutional:       General: He is not in acute distress.     Appearance: Normal appearance. He is not ill-appearing, toxic-appearing or diaphoretic.   Eyes:      General: No scleral icterus.  Pulmonary:      Effort: Pulmonary effort is normal.   Neurological:      Mental Status: He is alert.          Significant Labs:  CBC:   Recent Labs   Lab 07/06/24 0443 07/07/24 0449   WBC 4.86 4.54   HGB 12.8* 12.1*   HCT 37.0* 34.6*    162     CMP:   Recent Labs   Lab 07/07/24 0449   *   CALCIUM 7.3*   ALBUMIN 1.3*   PROT 4.4*   *   K 4.1   CO2 23      BUN 5*   CREATININE 0.6   ALKPHOS 412*   ALT 51*   AST 62*   BILITOT 2.7*         Significant Imaging:    Assessment:  Nithin Wagner is a 68 y.o. male with a history of HTN, DM2, HLD, Hep B - cleared (Surface Ag neg), RA, NAFLD, prior CVA, CAD s/p PCI and diastolic CHF w/ atrial myxoma who presented with weakness, N/V,  diarrhea and dehydration.  GI consulted for abnormal LFTs.  Feeling weak today.     Plan:    EGD tomorrow  I explained to patient the benefits, risks, indications, and alternatives to EGD.  He wishes to proceed.

## 2024-07-08 ENCOUNTER — ANESTHESIA (OUTPATIENT)
Dept: ENDOSCOPY | Facility: OTHER | Age: 69
End: 2024-07-08
Payer: MEDICARE

## 2024-07-08 ENCOUNTER — ANESTHESIA EVENT (OUTPATIENT)
Dept: ENDOSCOPY | Facility: OTHER | Age: 69
End: 2024-07-08
Payer: MEDICARE

## 2024-07-08 LAB
ALBUMIN SERPL BCP-MCNC: 1.3 G/DL (ref 3.5–5.2)
ALP SERPL-CCNC: 483 U/L (ref 55–135)
ALT SERPL W/O P-5'-P-CCNC: 46 U/L (ref 10–44)
ANION GAP SERPL CALC-SCNC: 4 MMOL/L (ref 8–16)
AST SERPL-CCNC: 61 U/L (ref 10–40)
BASOPHILS # BLD AUTO: 0.04 K/UL (ref 0–0.2)
BASOPHILS NFR BLD: 0.7 % (ref 0–1.9)
BILIRUB SERPL-MCNC: 2.6 MG/DL (ref 0.1–1)
BUN SERPL-MCNC: 7 MG/DL (ref 8–23)
CALCIUM SERPL-MCNC: 7.2 MG/DL (ref 8.7–10.5)
CHLORIDE SERPL-SCNC: 101 MMOL/L (ref 95–110)
CO2 SERPL-SCNC: 25 MMOL/L (ref 23–29)
CREAT SERPL-MCNC: 0.6 MG/DL (ref 0.5–1.4)
DIFFERENTIAL METHOD BLD: ABNORMAL
EOSINOPHIL # BLD AUTO: 0.1 K/UL (ref 0–0.5)
EOSINOPHIL NFR BLD: 1.2 % (ref 0–8)
ERYTHROCYTE [DISTWIDTH] IN BLOOD BY AUTOMATED COUNT: 16.1 % (ref 11.5–14.5)
EST. GFR  (NO RACE VARIABLE): >60 ML/MIN/1.73 M^2
GLUCOSE SERPL-MCNC: 192 MG/DL (ref 70–110)
HCT VFR BLD AUTO: 36.3 % (ref 40–54)
HGB BLD-MCNC: 12.1 G/DL (ref 14–18)
IMM GRANULOCYTES # BLD AUTO: 0.02 K/UL (ref 0–0.04)
IMM GRANULOCYTES NFR BLD AUTO: 0.3 % (ref 0–0.5)
LYMPHOCYTES # BLD AUTO: 1.2 K/UL (ref 1–4.8)
LYMPHOCYTES NFR BLD: 21.3 % (ref 18–48)
MAGNESIUM SERPL-MCNC: 1.8 MG/DL (ref 1.6–2.6)
MCH RBC QN AUTO: 30 PG (ref 27–31)
MCHC RBC AUTO-ENTMCNC: 33.3 G/DL (ref 32–36)
MCV RBC AUTO: 90 FL (ref 82–98)
MONOCYTES # BLD AUTO: 0.7 K/UL (ref 0.3–1)
MONOCYTES NFR BLD: 12.6 % (ref 4–15)
NEUTROPHILS # BLD AUTO: 3.7 K/UL (ref 1.8–7.7)
NEUTROPHILS NFR BLD: 63.9 % (ref 38–73)
NRBC BLD-RTO: 0 /100 WBC
PHOSPHATE SERPL-MCNC: 2.4 MG/DL (ref 2.7–4.5)
PLATELET # BLD AUTO: 144 K/UL (ref 150–450)
PLATELET BLD QL SMEAR: ABNORMAL
PMV BLD AUTO: 11.3 FL (ref 9.2–12.9)
POCT GLUCOSE: 159 MG/DL (ref 70–110)
POCT GLUCOSE: 170 MG/DL (ref 70–110)
POCT GLUCOSE: 185 MG/DL (ref 70–110)
POCT GLUCOSE: 200 MG/DL (ref 70–110)
POCT GLUCOSE: 202 MG/DL (ref 70–110)
POTASSIUM SERPL-SCNC: 4.4 MMOL/L (ref 3.5–5.1)
PROT SERPL-MCNC: 4.7 G/DL (ref 6–8.4)
RBC # BLD AUTO: 4.04 M/UL (ref 4.6–6.2)
SODIUM SERPL-SCNC: 130 MMOL/L (ref 136–145)
WBC # BLD AUTO: 5.78 K/UL (ref 3.9–12.7)

## 2024-07-08 PROCEDURE — 97535 SELF CARE MNGMENT TRAINING: CPT

## 2024-07-08 PROCEDURE — 0DB78ZX EXCISION OF STOMACH, PYLORUS, VIA NATURAL OR ARTIFICIAL OPENING ENDOSCOPIC, DIAGNOSTIC: ICD-10-PCS | Performed by: INTERNAL MEDICINE

## 2024-07-08 PROCEDURE — 37000008 HC ANESTHESIA 1ST 15 MINUTES: Performed by: INTERNAL MEDICINE

## 2024-07-08 PROCEDURE — 43239 EGD BIOPSY SINGLE/MULTIPLE: CPT | Performed by: INTERNAL MEDICINE

## 2024-07-08 PROCEDURE — 85025 COMPLETE CBC W/AUTO DIFF WBC: CPT | Performed by: HOSPITALIST

## 2024-07-08 PROCEDURE — 93010 ELECTROCARDIOGRAM REPORT: CPT | Mod: ,,, | Performed by: INTERNAL MEDICINE

## 2024-07-08 PROCEDURE — C1751 CATH, INF, PER/CENT/MIDLINE: HCPCS

## 2024-07-08 PROCEDURE — 97530 THERAPEUTIC ACTIVITIES: CPT

## 2024-07-08 PROCEDURE — A4216 STERILE WATER/SALINE, 10 ML: HCPCS | Performed by: HOSPITALIST

## 2024-07-08 PROCEDURE — 84100 ASSAY OF PHOSPHORUS: CPT | Performed by: HOSPITALIST

## 2024-07-08 PROCEDURE — 88312 SPECIAL STAINS GROUP 1: CPT | Performed by: STUDENT IN AN ORGANIZED HEALTH CARE EDUCATION/TRAINING PROGRAM

## 2024-07-08 PROCEDURE — 25000003 PHARM REV CODE 250: Performed by: HOSPITALIST

## 2024-07-08 PROCEDURE — 27201012 HC FORCEPS, HOT/COLD, DISP: Performed by: INTERNAL MEDICINE

## 2024-07-08 PROCEDURE — 36415 COLL VENOUS BLD VENIPUNCTURE: CPT | Performed by: HOSPITALIST

## 2024-07-08 PROCEDURE — 63600175 PHARM REV CODE 636 W HCPCS: Performed by: INTERNAL MEDICINE

## 2024-07-08 PROCEDURE — 88305 TISSUE EXAM BY PATHOLOGIST: CPT | Mod: 59 | Performed by: STUDENT IN AN ORGANIZED HEALTH CARE EDUCATION/TRAINING PROGRAM

## 2024-07-08 PROCEDURE — 88312 SPECIAL STAINS GROUP 1: CPT | Mod: 26,,, | Performed by: STUDENT IN AN ORGANIZED HEALTH CARE EDUCATION/TRAINING PROGRAM

## 2024-07-08 PROCEDURE — 25000003 PHARM REV CODE 250: Performed by: NURSE PRACTITIONER

## 2024-07-08 PROCEDURE — 97530 THERAPEUTIC ACTIVITIES: CPT | Mod: CQ

## 2024-07-08 PROCEDURE — 0DB58ZX EXCISION OF ESOPHAGUS, VIA NATURAL OR ARTIFICIAL OPENING ENDOSCOPIC, DIAGNOSTIC: ICD-10-PCS | Performed by: INTERNAL MEDICINE

## 2024-07-08 PROCEDURE — 93005 ELECTROCARDIOGRAM TRACING: CPT

## 2024-07-08 PROCEDURE — 25000003 PHARM REV CODE 250: Performed by: INTERNAL MEDICINE

## 2024-07-08 PROCEDURE — 63600175 PHARM REV CODE 636 W HCPCS: Performed by: ANESTHESIOLOGY

## 2024-07-08 PROCEDURE — 25000003 PHARM REV CODE 250: Performed by: STUDENT IN AN ORGANIZED HEALTH CARE EDUCATION/TRAINING PROGRAM

## 2024-07-08 PROCEDURE — 94761 N-INVAS EAR/PLS OXIMETRY MLT: CPT

## 2024-07-08 PROCEDURE — 37000009 HC ANESTHESIA EA ADD 15 MINS: Performed by: INTERNAL MEDICINE

## 2024-07-08 PROCEDURE — 36410 VNPNXR 3YR/> PHY/QHP DX/THER: CPT

## 2024-07-08 PROCEDURE — 0DB68ZX EXCISION OF STOMACH, VIA NATURAL OR ARTIFICIAL OPENING ENDOSCOPIC, DIAGNOSTIC: ICD-10-PCS | Performed by: INTERNAL MEDICINE

## 2024-07-08 PROCEDURE — 88305 TISSUE EXAM BY PATHOLOGIST: CPT | Mod: 26,,, | Performed by: STUDENT IN AN ORGANIZED HEALTH CARE EDUCATION/TRAINING PROGRAM

## 2024-07-08 PROCEDURE — 76937 US GUIDE VASCULAR ACCESS: CPT

## 2024-07-08 PROCEDURE — 25000003 PHARM REV CODE 250: Performed by: ANESTHESIOLOGY

## 2024-07-08 PROCEDURE — 0DB98ZX EXCISION OF DUODENUM, VIA NATURAL OR ARTIFICIAL OPENING ENDOSCOPIC, DIAGNOSTIC: ICD-10-PCS | Performed by: INTERNAL MEDICINE

## 2024-07-08 PROCEDURE — 83735 ASSAY OF MAGNESIUM: CPT | Performed by: HOSPITALIST

## 2024-07-08 PROCEDURE — 11000001 HC ACUTE MED/SURG PRIVATE ROOM

## 2024-07-08 PROCEDURE — 80053 COMPREHEN METABOLIC PANEL: CPT | Performed by: HOSPITALIST

## 2024-07-08 RX ORDER — GLUCAGON 1 MG
1 KIT INJECTION
Status: DISCONTINUED | OUTPATIENT
Start: 2024-07-08 | End: 2024-07-14 | Stop reason: HOSPADM

## 2024-07-08 RX ORDER — HYDROMORPHONE HYDROCHLORIDE 2 MG/ML
0.4 INJECTION, SOLUTION INTRAMUSCULAR; INTRAVENOUS; SUBCUTANEOUS EVERY 5 MIN PRN
Status: DISCONTINUED | OUTPATIENT
Start: 2024-07-08 | End: 2024-07-13

## 2024-07-08 RX ORDER — FLUCONAZOLE 100 MG/1
200 TABLET ORAL DAILY
Status: DISCONTINUED | OUTPATIENT
Start: 2024-07-09 | End: 2024-07-14 | Stop reason: HOSPADM

## 2024-07-08 RX ORDER — FLUCONAZOLE 2 MG/ML
400 INJECTION, SOLUTION INTRAVENOUS ONCE
Status: COMPLETED | OUTPATIENT
Start: 2024-07-08 | End: 2024-07-08

## 2024-07-08 RX ORDER — LIDOCAINE HYDROCHLORIDE 20 MG/ML
INJECTION INTRAVENOUS
Status: DISCONTINUED | OUTPATIENT
Start: 2024-07-08 | End: 2024-07-08

## 2024-07-08 RX ORDER — PROPOFOL 10 MG/ML
INJECTION, EMULSION INTRAVENOUS
Status: DISCONTINUED | OUTPATIENT
Start: 2024-07-08 | End: 2024-07-08

## 2024-07-08 RX ORDER — ONDANSETRON HYDROCHLORIDE 2 MG/ML
4 INJECTION, SOLUTION INTRAVENOUS DAILY PRN
Status: DISCONTINUED | OUTPATIENT
Start: 2024-07-08 | End: 2024-07-13

## 2024-07-08 RX ORDER — OXYCODONE HYDROCHLORIDE 5 MG/1
5 TABLET ORAL
Status: DISCONTINUED | OUTPATIENT
Start: 2024-07-08 | End: 2024-07-13

## 2024-07-08 RX ORDER — GUAIFENESIN AND DEXTROMETHORPHAN HYDROBROMIDE 10; 100 MG/5ML; MG/5ML
10 SYRUP ORAL EVERY 4 HOURS PRN
Status: DISCONTINUED | OUTPATIENT
Start: 2024-07-08 | End: 2024-07-14 | Stop reason: HOSPADM

## 2024-07-08 RX ORDER — MEPERIDINE HYDROCHLORIDE 25 MG/ML
12.5 INJECTION INTRAMUSCULAR; INTRAVENOUS; SUBCUTANEOUS ONCE AS NEEDED
Status: ACTIVE | OUTPATIENT
Start: 2024-07-08 | End: 2024-07-09

## 2024-07-08 RX ORDER — SODIUM CHLORIDE 0.9 % (FLUSH) 0.9 %
3 SYRINGE (ML) INJECTION
Status: DISCONTINUED | OUTPATIENT
Start: 2024-07-08 | End: 2024-07-13

## 2024-07-08 RX ADMIN — OXYCODONE AND ACETAMINOPHEN 1 TABLET: 10; 325 TABLET ORAL at 05:07

## 2024-07-08 RX ADMIN — PROPOFOL 20 MG: 10 INJECTION, EMULSION INTRAVENOUS at 07:07

## 2024-07-08 RX ADMIN — OXYCODONE AND ACETAMINOPHEN 1 TABLET: 10; 325 TABLET ORAL at 02:07

## 2024-07-08 RX ADMIN — SODIUM CHLORIDE, SODIUM LACTATE, POTASSIUM CHLORIDE, AND CALCIUM CHLORIDE: .6; .31; .03; .02 INJECTION, SOLUTION INTRAVENOUS at 07:07

## 2024-07-08 RX ADMIN — OXYCODONE HYDROCHLORIDE 5 MG: 5 TABLET ORAL at 09:07

## 2024-07-08 RX ADMIN — Medication 1 EACH: at 11:07

## 2024-07-08 RX ADMIN — PROPOFOL 70 MG: 10 INJECTION, EMULSION INTRAVENOUS at 07:07

## 2024-07-08 RX ADMIN — ONDANSETRON 4 MG: 2 INJECTION INTRAMUSCULAR; INTRAVENOUS at 06:07

## 2024-07-08 RX ADMIN — METOCLOPRAMIDE 10 MG: 10 TABLET ORAL at 05:07

## 2024-07-08 RX ADMIN — SODIUM CHLORIDE, PRESERVATIVE FREE 10 ML: 5 INJECTION INTRAVENOUS at 05:07

## 2024-07-08 RX ADMIN — METOCLOPRAMIDE 10 MG: 10 TABLET ORAL at 10:07

## 2024-07-08 RX ADMIN — ASCORBIC ACID, VITAMIN A PALMITATE, CHOLECALCIFEROL, THIAMINE HYDROCHLORIDE, RIBOFLAVIN-5 PHOSPHATE SODIUM, PYRIDOXINE HYDROCHLORIDE, NIACINAMIDE, DEXPANTHENOL, ALPHA-TOCOPHEROL ACETATE, VITAMIN K1, FOLIC ACID, BIOTIN, CYANOCOBALAMIN: 200; 3300; 200; 6; 3.6; 6; 40; 15; 10; 150; 600; 60; 5 INJECTION, SOLUTION INTRAVENOUS at 09:07

## 2024-07-08 RX ADMIN — ESCITALOPRAM OXALATE 20 MG: 10 TABLET ORAL at 09:07

## 2024-07-08 RX ADMIN — ONDANSETRON 4 MG: 2 INJECTION INTRAMUSCULAR; INTRAVENOUS at 05:07

## 2024-07-08 RX ADMIN — OXYCODONE HYDROCHLORIDE 5 MG: 5 TABLET ORAL at 10:07

## 2024-07-08 RX ADMIN — ONDANSETRON 4 MG: 2 INJECTION INTRAMUSCULAR; INTRAVENOUS at 11:07

## 2024-07-08 RX ADMIN — LAMIVUDINE 150 MG: 150 TABLET, FILM COATED ORAL at 09:07

## 2024-07-08 RX ADMIN — FLUCONAZOLE 400 MG: 2 INJECTION, SOLUTION INTRAVENOUS at 11:07

## 2024-07-08 RX ADMIN — PANTOPRAZOLE SODIUM 40 MG: 40 INJECTION, POWDER, LYOPHILIZED, FOR SOLUTION INTRAVENOUS at 10:07

## 2024-07-08 RX ADMIN — LIDOCAINE HYDROCHLORIDE 50 MG: 20 INJECTION, SOLUTION INTRAVENOUS at 07:07

## 2024-07-08 RX ADMIN — DIGOXIN 0.12 MG: 125 TABLET ORAL at 10:07

## 2024-07-08 RX ADMIN — OXYCODONE AND ACETAMINOPHEN 1 TABLET: 10; 325 TABLET ORAL at 06:07

## 2024-07-08 RX ADMIN — INSULIN ASPART 2 UNITS: 100 INJECTION, SOLUTION INTRAVENOUS; SUBCUTANEOUS at 04:07

## 2024-07-08 NOTE — PROGRESS NOTES
[facility]  Progress Note    Patient Name: Nithin Wagner  MRN: [id number]  Patient Class: IP- Inpatient   Admission Date: 6/28/2024  Length of Stay: 9 days  Attending Physician: CARISA Lozano MD  Primary Care Provider: Tushar Drew MD        Subjective:     Principal Problem:Colitis        HPI:  Nithin Wagner is a 68 y.o. male with a past medical history of hepatitis-B, hypertension, diastolic heart failure, diabetes, rheumatoid arthritis, hyponatremia, s/p s/p T11 and L3 kyphoplasty on 4/22/2024 who presents to the ED with lower abdominal pain recurrent onset 4 days ago.  Patient reports associated nausea and vomiting and states he has been experiencing intermittent diarrhea over the past several months.  He denies blood in stool.  Patient  was seen here on i 06/26/2024 for emesis and weakness. Workup showed findings suggestive of colitis and dehydration. He was offered admission at that time but he stated he would prefer to go home. Stated he went home and was unable to hold down significant fluids and came back.  Reports subjective fever a few days ago.  Had a normal bowel movement this morning     ED workup significant for unremarkable CBC, mild hyponatremia 132, T bili 4.1, albumin 1.6,  and .  Lactic acid 1.2.  CT abdomen performed 06/26/2024 showed diffuse colitis.  Patient was started on IV Cipro and Flagyl in the ED and referred to Hospital Medicine.  Patient will be admitted for further evaluation and management.    Overview/Hospital Course:  Mr. Wagner presented with abdominal pain with nausea and vomiting.  Placed in observation status.  Treatment initiated for colitis with IV Cipro and Flagyl, IV fluids, antiemetics and supportive care.  GI consulted. Underwent paracentesis. LFTs gradually improved though appetite remained poor. Completed course of antibiotic therapy. Autoimmune workup for liver disease unremarkable. Scheduled antiemetics with  improvement in symptoms though not in appetite. Started PPN. EGD performed 07/08 demonstrating white plaques in esophagus and gastritis; started fluconazole.    Interval History: No acute events overnight. Mild pain; discussed EGD findings.    Review of Systems   Constitutional:  Negative for chills and fever.   Respiratory:  Negative for cough and shortness of breath.    Cardiovascular:  Negative for chest pain and palpitations.   Gastrointestinal:  Positive for abdominal pain. Negative for nausea and vomiting.     Objective:     Vital Signs (Most Recent):  Temp: 98.5 °F (36.9 °C) (07/08/24 2029)  Pulse: 94 (07/08/24 2029)  Resp: 15 (07/08/24 2156)  BP: 115/74 (07/08/24 2029)  SpO2: (!) 93 % (07/08/24 2029) Vital Signs (24h Range):  Temp:  [97.6 °F (36.4 °C)-98.5 °F (36.9 °C)] 98.5 °F (36.9 °C)  Pulse:  [] 94  Resp:  [14-18] 15  SpO2:  [93 %-97 %] 93 %  BP: ()/(56-85) 115/74     Weight: 83.1 kg (183 lb 3.2 oz)  Body mass index is 24.85 kg/m².    Intake/Output Summary (Last 24 hours) at 7/8/2024 2315  Last data filed at 7/8/2024 1800  Gross per 24 hour   Intake 1450.59 ml   Output 1825 ml   Net -374.41 ml         Physical Exam  Vitals and nursing note reviewed.   Constitutional:       General: He is not in acute distress.     Appearance: He is well-developed.   HENT:      Head: Normocephalic and atraumatic.   Eyes:      General:         Right eye: No discharge.         Left eye: No discharge.      Conjunctiva/sclera: Conjunctivae normal.   Cardiovascular:      Rate and Rhythm: Normal rate.      Pulses: Normal pulses.   Pulmonary:      Effort: Pulmonary effort is normal. No respiratory distress.   Abdominal:      General: There is distension.      Palpations: Abdomen is soft.      Tenderness: There is abdominal tenderness.   Musculoskeletal:         General: Normal range of motion.      Right lower leg: Edema present.      Left lower leg: Edema present.   Skin:     General: Skin is warm and dry.    Neurological:      Mental Status: He is alert and oriented to person, place, and time.           Significant Labs:   CBC:  Recent Labs   Lab 07/06/24 0443 07/07/24 0449 07/08/24 0449   WBC 4.86 4.54 5.78   HGB 12.8* 12.1* 12.1*   HCT 37.0* 34.6* 36.3*    162 144*   GRAN 64.2  3.1 53.9  2.5 63.9  3.7   LYMPH 22.8  1.1 29.5  1.3 21.3  1.2   MONO 9.3  0.5 12.6  0.6 12.6  0.7   EOS 0.1 0.1 0.1   BASO 0.06 0.06 0.04   CMP:  Recent Labs   Lab 07/06/24 0443 07/07/24 0449 07/08/24 0449   * 130* 130*   K 3.9 4.1 4.4   CL 99 101 101   CO2 23 23 25   BUN 5* 5* 7*   CREATININE 0.7 0.6 0.6   * 164* 192*   CALCIUM 7.3* 7.3* 7.2*   MG 1.7 1.9 1.8   PHOS 3.1 2.6* 2.4*   ALKPHOS 400* 412* 483*   AST 77* 62* 61*   ALT 60* 51* 46*   BILITOT 3.2* 2.7* 2.6*   PROT 4.5* 4.4* 4.7*   ALBUMIN 1.4* 1.3* 1.3*   ANIONGAP 8 6* 4*      Significant Imaging: I have reviewed and interpreted all pertinent imaging results/findings within the past 24 hours.      Assessment/Plan:      * Colitis  Abnormal LFTs  Hypokalemia  - Patient with intermittent nausea, vomiting and diarrhea.  Reports subjective fever.  CT abdomen with findings concerning for colitis.   - Completed 7 days of ciprofloxacin/metronidazole.  - Liver function tests with some improvement, cholestatic pattern initially.  - Patient with some clinical improvement in symptoms and stool studies negative for C.diff  - MRCP was negative for obstruction, noted gallbladder distention and large amount of ascites  - Case discussed with GI; paracentesis 07/02 demonstrating SAAG 1.1. NITIN, anti-mitochondrial, ASMA negative.  - Replete lytes as required. Monitor PO intake. Continue ondansetron, metoclopramide, continue PPN.  - To EGD this morning.  - Appreciate GI assistance.    SVT (supraventricular tachycardia)  - History SVT noted.  Followed by Cardiology, Dr. Doshi, outpatient.  States he takes digoxin and has had no recent episodes of SVT  - Continue digoxin  0.125 mg p.o. daily  - Monitor on telemetry    Type 2 diabetes mellitus, with long-term current use of insulin  - Recent HgbA1c 7.4 performed on 3/2024.  Patient has not been taking insulin and was on tirzepeptide and stopped taking in April  - Glucose level reviewed, in normal to slightly elevated range  - Continue monitor with Accu-Cheks q.a.c. and q.h.s. and give low-dose sliding scale insulin as necessary    Chronic diastolic heart failure  - Recent Echo performed 04/03/2024 showed EF 55-60%.  Patient states he no longer takes Lasix.  Lower extremity edema noted, patient denies shortness of breath  - Clinically appears near euvolemic though with anasarca due to liver disease / hypoalbuminemia.    NAFLD (nonalcoholic fatty liver disease)  - History noted.  LFTs on admission elevated from baseline with , T bili 4.1,  and .  - As discussed above    Hepatitis B core antibody positive  - Patient states he was exposed to hep B while working in [place] in the 1990s and is on prophylaxis with lamivudine   - Continue lamivudine daily      VTE Risk Mitigation (From admission, onward)           Ordered     Place CHELE hose  Until discontinued         07/03/24 2056     IP VTE HIGH RISK PATIENT  Once         06/28/24 2107     Place sequential compression device  Until discontinued         06/28/24 2107                    Discharge Planning   ALEXANDER:      Code Status: Full Code   Is the patient medically ready for discharge?:     Reason for patient still in hospital (select all that apply): Treatment  Discharge Plan A: Home Health   Discharge Delays: None known at this time              COURTNEY Lozano MD  Department of Hospital Medicine   [facility] ([place])

## 2024-07-08 NOTE — OR NURSING
Dr Lockhart at bedside to see pt. States can go back to his room. Pt continues without c/o pain at this time. No change from previous assessment. Prepared for transfer to inpt room. Pt placed on transport.

## 2024-07-08 NOTE — [employer] PATIENT INSTRUCTIONS
Discharge Summary/Instructions after an Endoscopic Procedure  Patient Name: Nithin Wagner  Patient MRN: [id number]  Patient YOB: 1955 Monday, July 8, 2024  Janet Win MD  RESTRICTIONS:  During your procedure today, you received medications for sedation.  These   medications may affect your judgment, balance and coordination.  Therefore,   for 24 hours, you have the following restrictions:   - DO NOT drive a car, operate machinery, make legal/financial decisions,   sign important papers or drink alcohol.    ACTIVITY:  Today: no heavy lifting, straining or running due to procedural   sedation/anesthesia.  The following day: return to full activity including work.  DIET:  Eat and drink normally unless instructed otherwise.     TREATMENT FOR COMMON SIDE EFFECTS:  - Mild abdominal pain, nausea, belching, bloating or excessive gas:  rest,   eat lightly and use a heating pad.  - Sore Throat: treat with throat lozenges and/or gargle with warm salt   water.  - Because air was used during the procedure, expelling large amounts of air   from your rectum or belching is normal.  - If a bowel prep was taken, you may not have a bowel movement for 1-3 days.    This is normal.  SYMPTOMS TO WATCH FOR AND REPORT TO YOUR PHYSICIAN:  1. Abdominal pain or bloating, other than gas cramps.  2. Chest pain.  3. Back pain.  4. Signs of infection such as: chills or fever occurring within 24 hours   after the procedure.  5. Rectal bleeding, which would show as bright red, maroon, or black stools.   (A tablespoon of blood from the rectum is not serious, especially if   hemorrhoids are present.)  6. Vomiting.  7. Weakness or dizziness.  GO DIRECTLY TO THE NEAREST EMERGENCY ROOM IF YOU HAVE ANY OF THE FOLLOWING:      Difficulty breathing              Chills and/or fever over 101 F   Persistent vomiting and/or vomiting blood   Severe abdominal pain   Severe chest pain   Black, tarry stools   Bleeding- more than one tablespoon   Any other  symptom or condition that you feel may need urgent attention  Your doctor recommends these additional instructions:  If any biopsies were taken, your doctors clinic will contact you in 1 to 2   weeks with any results.  - [facility] patient to [facility].   - Resume previous diet today.   - Continue present medications.   - Await pathology results.   - Return to GI clinic in 2 weeks.  For questions, problems or results please call your physician - Janet Win MD at Work:  [phone].  [facility], EMERGENCY ROOM PHONE NUMBER: [phone], [facility]   [phone].  IF A COMPLICATION OR EMERGENCY SITUATION ARISES AND YOU ARE UNABLE TO REACH   YOUR PHYSICIAN - GO DIRECTLY TO THE EMERGENCY ROOM.  Janet Win MD  7/8/2024 7:38:08 AM  This report has been verified and signed electronically.  Dear patient,  As a result of recent federal legislation (The Federal Cures Act), you may   receive lab or pathology results from your procedure in your [facility]   account before your physician is able to contact you. Your physician or   their representative will relay the results to you with their   recommendations at their soonest availability.  Thank you,  [employer]

## 2024-07-08 NOTE — PLAN OF CARE
Recommendations  1) If pt to remain on PPN: consider increasing Clinimix 4.25/5 to 100 mL/hr with IVFE to meet 65% EEN (25 kcal/kg/d) and 104% EPN (1.2 g/kg/d)     2) If TPN warranted: via central line, administer Clinimix E 5/15 @ 85 mL/hr with standard daily lipids - Provides 2040 mL total volume, 102 g protein, 306 g dextrose, 1948 kcal, GIR 2.6     3) Monitor PO intake and tolerance - Adv diet back to low fiber diet     4) RD to monitor and follow up    Goals:   1) Pt able to tolerate initiation and advancement of TPN by RD follow up (goal met)  2) Pt will have diet adv and able to tolerate 50-75% solid PO by RD follow up  Nutrition Goal Status: progressing towards goals  Communication of RD Recs:  (POC)

## 2024-07-08 NOTE — CONSULTS
20G x 10cm PowerGlide Pro placed to right basilic vein using real time ultrasound.  Good blood return.  Flushes with ease. CHG patch in place.  Sterile dressing applied. Baseline arm circumference is 29 cm.   Patient tolerated well.    LOT # [id number]

## 2024-07-08 NOTE — NURSING
Notified Cassandra regarding possible issue with midline. PPN on hold until confirmation that midline is good to use.

## 2024-07-08 NOTE — PHYSICIAN QUERY
Please clarify the nutritional diagnosis associated with the clinical findings (include all that apply):  Severe Protein Calorie Malnutrition

## 2024-07-08 NOTE — PROGRESS NOTES
[facility] ([place])  Adult Nutrition  Progress Note    SUMMARY       Recommendations  1) If pt to remain on PPN: consider increasing Clinimix 4.25/5 to 100 mL/hr with IVFE to meet 65% EEN (25 kcal/kg/d) and 104% EPN (1.2 g/kg/d)     2) If TPN warranted: via central line, administer Clinimix E 5/15 @ 85 mL/hr with standard daily lipids - Provides 2040 mL total volume, 102 g protein, 306 g dextrose, 1948 kcal, GIR 2.6     3) Monitor PO intake and tolerance - Adv diet back to low fiber diet     4) RD to monitor and follow up    Goals:   1) Pt able to tolerate initiation and advancement of TPN by RD follow up (goal met)  2) Pt will have diet adv and able to tolerate 50-75% solid PO by RD follow up  Nutrition Goal Status: progressing towards goals  Communication of RD Recs:  (POC)    Assessment and Plan    Endocrine  Severe protein-calorie malnutrition  Malnutrition Type:  Context: acute illness or injury, chronic illness  Level: severe    Related to (etiology):   Altered GI function    Signs and Symptoms (as evidenced by):   Inability to to keep anything down d/t N/V/D  > 10% weight loss in 6 months  Pt states he hasn't been able to eat for 2 months     Malnutrition Characteristic Summary:  Weight Loss (Malnutrition): greater than 10% in 6 months  Energy Intake (Malnutrition): less than 75% for greater than or equal to 1 month (NPO/CLD since admit 6/28)  Muscle Mass (Malnutrition): severe depletion      Interventions (treatment strategy):  Modify rate of PPN  Fiber modified diet  Collaboration with other providers    Nutrition Diagnosis Status:   Continues         Malnutrition Assessment  Malnutrition Context: acute illness or injury, chronic illness  Malnutrition Level: severe  Skin (Micronutrient): bruised, turgor reduced (skin tears easily)       Weight Loss (Malnutrition): greater than 10% in 6 months  Energy Intake (Malnutrition): less than 75% for greater than or equal to 1 month (NPO/CLD since admit  6/28)  Muscle Mass (Malnutrition): severe depletion   Orbital Region (Subcutaneous Fat Loss): well nourished  Upper Arm Region (Subcutaneous Fat Loss): severe depletion   [place] Region (Muscle Loss): mild depletion  Clavicle Bone Region (Muscle Loss): mild depletion  Clavicle and Acromion Bone Region (Muscle Loss): moderate depletion  Patellar Region (Muscle Loss): moderate depletion  Anterior Thigh Region (Muscle Loss): severe depletion  Posterior Calf Region (Muscle Loss): severe depletion                 Reason for Assessment    Reason For Assessment: RD follow-up  Diagnosis: gastrointestinal disease (colitis)  Relevant Medical History:   Patient Active Problem List   Diagnosis    Hepatitis B core antibody positive    NAFLD (nonalcoholic fatty liver disease)    Essential hypertension    Chronic diastolic heart failure    Type 2 diabetes mellitus, with long-term current use of insulin    Class 1 obesity due to excess calories with serious comorbidity and body mass index (BMI) of 34.0 to 34.9 in adult    Tobacco use disorder    Polycythemia, secondary    Canker sores oral    Coronary artery disease    Chronic use of opiate drug for therapeutic purpose    Rheumatoid arthritis flare    Fatigue    Acute on chronic diastolic heart failure    Supraventricular tachycardia    Hyponatremia    Atherosclerotic peripheral vascular disease    Folliculitis    Rheumatoid arthritis involving multiple sites with positive rheumatoid factor    Anxiety    Long term (current) use of systemic steroids    Vitamin D deficiency    Osteoporosis    Hyperlipidemia    Severe obesity    Type 2 diabetes mellitus with other circulatory complication, with long-term current use of insulin    Nuclear sclerotic cataract of right eye    Lumbar vertebral fracture    Intractable pain    Diarrhea of presumed infectious origin    Enteritis    Nephrolithiasis    Constipation    Depression    Testicular pain, left    Fatty (change of) liver, not elsewhere  classified    Colitis    SVT (supraventricular tachycardia)    Severe protein-calorie malnutrition    Abnormal LFTs (liver function tests)     Interdisciplinary Rounds: did not attend  General Information Comments: Pt NPO this morning s/p EGD. Previously on low fiber diet, tolerating 0-25% intake of meals. Clinimix-E 4.25/5 ordered at 85 mL/hr.  Nutrition Discharge Planning: pending medical course    Nutrition Related Social Determinants of Health: SDOH: Adequate food in home environment    Nutrition Risk Screen    Nutrition Risk Screen: unintentional loss of 10 lbs or more in the past 2 months, reduced oral intake over the last month    Nutrition/Diet History    Spiritual, Cultural Beliefs, Congregation Practices, Values that Affect Care: no  Factors Affecting Nutritional Intake: altered gastrointestinal function, abdominal pain, nausea/vomiting, diarrhea    Anthropometrics    Temp: 98.2 °F (36.8 °C)  Height Method: Stated  Height: 6' (182.9 cm)  Height (inches): 72 in  Weight Method: Bed Scale  Weight: 83.1 kg (183 lb 3.2 oz)  Weight (lb): 183.2 lb  Ideal Body Weight (IBW), Male: 178 lb  % Ideal Body Weight, Male (lb): 100.94 %  BMI (Calculated): 24.8  BMI Grade: 18.5-24.9 - normal  Weight Loss: unintentional  Usual Body Weight (UBW), k kg  % Usual Body Weight: 81.67  % Weight Change From Usual Weight: -18.5 %       Lab/Procedures/Meds    Pertinent Labs Reviewed: reviewed  CBC:  Recent Labs   Lab 24   WBC 5.78   HGB 12.1*   HCT 36.3*   *     CMP:  Recent Labs   Lab 24   CALCIUM 7.2*   ALBUMIN 1.3*   PROT 4.7*   *   K 4.4   CO2 25      BUN 7*   CREATININE 0.6   ALKPHOS 483*   ALT 46*   AST 61*   BILITOT 2.6*     BMP:   Recent Labs   Lab 24   *   *   K 4.4      CO2 25   BUN 7*   CREATININE 0.6   CALCIUM 7.2*   MG 1.8     Triglycerides   Date Value Ref Range Status   2024 218 (H) 30 - 150 mg/dL Final     Comment:     The National  Cholesterol Education Program (NCEP) has set the  following guidelines (reference values) for triglycerides:  Normal......................<150 mg/dL  Borderline High.............150-199 mg/dL  High........................200-499 mg/dL           Pertinent Medications Reviewed: reviewed  Scheduled Meds:   digoxin  0.125 mg Oral Daily    EScitalopram oxalate  20 mg Oral QHS    fat emulsion 20%  250 mL Intravenous Daily    [START ON 7/9/2024] fat emulsion 20%  250 mL Intravenous Daily    fluconazole (DIFLUCAN) IV (PEDS and ADULTS)  400 mg Intravenous Once    [START ON 7/9/2024] fluconazole  200 mg Oral Daily    lactobacillus acidophilus & bulgar  1 packet Oral TID WM    lamiVUDine  150 mg Oral QHS    LIDOcaine  1 patch Transdermal Q24H    metoclopramide HCl  10 mg Oral TID AC    ondansetron  4 mg Intravenous Q6H    pantoprazole  40 mg Intravenous Daily    sodium chloride 0.9%  10 mL Intravenous Q6H     Continuous Infusions:   Amino acid 4.25% - dextrose 5% (CLINIMIX-E) solution with additives (1L provides 42.5 gm AA, 50 gm CHO (170 kcal/L dextrose), Na 35, K 30, Mg 5, Ca 4.5, Acetate 70, Cl 39, Phos 15)   Intravenous Continuous 85 mL/hr at 07/07/24 2128 New Bag at 07/07/24 2128    Amino acid 4.25% - dextrose 5% (CLINIMIX-E) solution with additives (1L provides 42.5 gm AA, 50 gm CHO (170 kcal/L dextrose), Na 35, K 30, Mg 5, Ca 4.5, Acetate 70, Cl 39, Phos 15)   Intravenous Continuous         PRN Meds:.  Current Facility-Administered Medications:     acetaminophen, 650 mg, Oral, Q8H PRN    dextromethorphan-guaiFENesin  mg/5 ml, 10 mL, Oral, Q4H PRN    dextrose 10%, 12.5 g, Intravenous, PRN    dextrose 10%, 25 g, Intravenous, PRN    glucagon (human recombinant), 1 mg, Intramuscular, PRN    glucagon (human recombinant), 1 mg, Intramuscular, PRN    glucose, 16 g, Oral, PRN    glucose, 24 g, Oral, PRN    HYDROmorphone, 0.4 mg, Intravenous, Q5 Min PRN    hydrOXYzine HCL, 50 mg, Oral, QID PRN    insulin aspart U-100, 0-5  Units, Subcutaneous, QID (AC + HS) PRN    loperamide, 1 mg, Oral, QID PRN    melatonin, 6 mg, Oral, Nightly PRN    meperidine, 12.5 mg, Intravenous, Once PRN    naloxone, 0.02 mg, Intravenous, PRN    ondansetron, 4 mg, Intravenous, Daily PRN    oxyCODONE, 5 mg, Oral, Q3H PRN    oxyCODONE-acetaminophen, 1 tablet, Oral, Q4H PRN    prochlorperazine, 5 mg, Intravenous, Q6H PRN    Flushing PICC/Midline Protocol, , , Until Discontinued **AND** sodium chloride 0.9%, 10 mL, Intravenous, Q6H **AND** sodium chloride 0.9%, 10 mL, Intravenous, PRN    sodium chloride 0.9%, 3 mL, Intravenous, PRN    Estimated/Assessed Needs    Weight Used For Calorie Calculations: 81.5 kg (179 lb 10.8 oz)  Energy Calorie Requirements (kcal): 5707-4602  Energy Need Method: Kcal/kg (25-30)  Protein Requirements:  g (1.2-1.5 g/kg during colitis flare)  Weight Used For Protein Calculations: 81.5 kg (179 lb 10.8 oz)  Fluid Requirements (mL): 1 mL/kcal  Estimated Fluid Requirement Method:  (or per MD)  RDA Method (mL): 2037  CHO Requirement: 250 g (50% EEN)      Nutrition Prescription Ordered    Current Diet Order: NPO  Current Nutrition Support Formula Ordered: Clinimix 4.25/5  Current Nutrition Support Rate Ordered: 85 (ml)  Current Nutrition Support Frequency Ordered: mL/hr    Evaluation of Received Nutrient/Fluid Intake    Parenteral Calories (kcal): 694  Parenteral Protein (gm): 87  Parenteral Fluid (mL): 2040  Lipid Calories (kcals): 500 kcals  GIR (Glucose Infusion Rate) (mg/kg/min): 0.85 mg/kg/min  Oral Calories (kcal):  (NPO)  Oral Protein (gm):  (NPO)  % Kcal Needs: 59% (PPN)  % Protein Needs: 89% (PPN)  I/O: + 6.4 L since admit  Energy Calories Required: not meeting needs  Protein Required: not meeting needs  Fluid Required: not meeting needs  Comments: LBM: 7/5/2024  Tolerance: not tolerating  % Intake of Estimated Energy Needs: 50 - 75 %  % Meal Intake: 0 - 25 %    Nutrition Risk    Level of Risk/Frequency of Follow-up: moderate -  high (follow up: 2-3 x per week)     Monitor and Evaluation    Food and Nutrient Intake: energy intake, food and beverage intake, parenteral nutrition intake  Food and Nutrient Adminstration: diet order, enteral and parenteral nutrition administration  Knowledge/Beliefs/Attitudes: food and nutrition knowledge/skill, beliefs and attitudes  Physical Activity and Function: nutrition-related ADLs and IADLs  Anthropometric Measurements: weight, weight change  Biochemical Data, Medical Tests and Procedures: electrolyte and renal panel, gastrointestinal profile, glucose/endocrine profile, inflammatory profile, lipid profile     Nutrition Follow-Up    RD Follow-up?: Yes    Radha Quintero RDN, FARHANAN

## 2024-07-08 NOTE — PT/OT/SLP PROGRESS
Occupational Therapy   Treatment    Name: Nithin Wagner  MRN: [id number]  Admitting Diagnosis:  Colitis  Day of Surgery    Recommendations:     Discharge Recommendations: Low Intensity Therapy (Antcipated)  Discharge Equipment Recommendations:   (None anticipated)  Barriers to discharge:   (current functional level)    Assessment:   Ambulating short household distance with RW and CGA for steadying/safety though with slowed pace and also requiring seated rest break once reaching sink and again upon returning to bed.  Pt. Requiring retrieval of needed grooming items and washing face and combing hair in stance at sink with SBA and seated rest break after task d/t decreased standing tolerance.  Progressing towards goals.  Anticipate need for Low intensity therapy with continued paid assist and assist from family though will continue to assess needs pending progress.  To benefit from continued acute care OT services to increase independence in self-care/functional transfers.  Continue POC.     Nithin Wagner is a 68 y.o. male with a medical diagnosis of Colitis.  He presents with below deficits decreasing independence in self-care/functional transfers. Performance deficits affecting function are weakness, impaired endurance, impaired self care skills, impaired functional mobility, gait instability, impaired balance, decreased coordination, decreased upper extremity function, decreased lower extremity function, decreased safety awareness, pain.     Rehab Prognosis:  Good; patient would benefit from acute skilled OT services to address these deficits and reach maximum level of function.       Plan:     Patient to be seen 4 x/week to address the above listed problems via self-care/home management, therapeutic activities, therapeutic exercises  Plan of Care Expires: 08/02/24  Plan of Care Reviewed with: patient    Subjective     Chief Complaint: With c/o back and abdominal pain.   Patient/Family Comments/goals: Pt. Expressed  wanting to do grooming/hygiene.  Pain/Comfort:  Pain Rating 1: 7/10  Location - Orientation 1: midline (lower)  Location 1: back  Pain Addressed 1: Distraction, Cessation of Activity, Nurse notified  Pain Rating Post-Intervention 1: 7/10  Pain Rating 2: 6/10  Location - Orientation 2: medial  Location 2: abdomen  Pain Addressed 2: Distraction, Cessation of Activity, Nurse notified  Pain Rating Post-Intervention 2: 6/10    Objective:     Communicated with: Shirley HENSON RN prior to session.  Patient found HOB elevated with peripheral IV upon OT entry to room.    General Precautions: Standard, diabetic, fall    Orthopedic Precautions:N/A  Braces: N/A  Respiratory Status: Room air     Occupational Performance:     Bed Mobility:    Supine>sit with MIN A for upright trunk and sit>supine with CGA for guiding with bed rail used; verbal cuing needed for log roll tech for comfort with back pain.     Functional Mobility/Transfers:  Sit>stand EOB>RW and sit<>stand bedside chair<>RW with MIN A for lift, increased time, and consistent cuing for safe hand placement during transitions.  Ambulating bed>sink>bed with RW and CGA for steadying/safety and seated rest  break needed once reaching sink and again upon return to bed.  Step transfer to bed with MIN A and verbal cuing for safe hand placement during transition.      Activities of Daily Living:  Requiring retrieval of all needed items and cues for safe RW positioning prior to task.  Oral hygiene and combing hair in stance with SBA and verbal cuing for combing back of head with seated rest break once performing task.        [facility] 6 Click ADL: 16    Treatment & Education:  Supine>sit with MIN A for upright trunk and sit>supine with CGA for guiding with bed rail used; verbal cuing needed for log roll tech for comfort with back pain.   Sit>stand EOB>RW and sit<>stand bedside chair<>RW with MIN A for lift, increased time, and consistent cuing for safe hand placement during transitions.   Ambulating bed>sink>bed with RW and CGA for steadying/safety and seated rest  break needed once reaching sink and again upon return to bed.  Step transfer to bed with MIN A and verbal cuing for safe hand placement during transition.    Requiring retrieval of all needed items and cues for safe RW positioning prior to task.  Oral hygiene and combing hair in stance with SBA and verbal cuing for combing back of head with seated rest break once performing task.    Received call light review and importance of calling for assist as needed.     Patient left HOB elevated with all lines intact, call button in reach, bed alarm on, and nursing notified    GOALS:   Multidisciplinary Problems       Occupational Therapy Goals          Problem: Occupational Therapy    Goal Priority Disciplines Outcome Interventions   Occupational Therapy Goal     OT, PT/OT Progressing    Description: 1. Commode T/F with RW with SPV  2. Toileting with SPV  3. Two G/H tasks standing at sink with RW and SPV  4. UB dressing with Setup  5. LB dressing adhering to spinal precautions with Setup/SPV, AE PRN                       Time Tracking:     OT Date of Treatment: 07/08/24  OT Start Time: 1248  OT Stop Time: 1322  OT Total Time (min): 34 min    Billable Minutes:Self Care/Home Management 12  Therapeutic Activity 22    OT/MARS: OT          7/8/2024

## 2024-07-08 NOTE — PROGRESS NOTES
[facility] - Med Surg ([place])  Wound Care    Patient Name:  Nithin Wagner   MRN:  [id number]  Date: 7/8/2024  Diagnosis: Colitis    History:     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    forehead    Stroke     TIA       Social History     Socioeconomic History    Marital status: Single   Occupational History    Occupation: , respiratory therapist, founded [employer]     Comment: Retired   Tobacco Use    Smoking status: Every Day     Current packs/day: 1.00     Average packs/day: 1 pack/day for 35.0 years (35.0 ttl pk-yrs)     Types: Cigarettes    Smokeless tobacco: Never   Substance and Sexual Activity    Alcohol use: Not Currently    Drug use: No    Sexual activity: Never     Social Determinants of Health     Financial Resource Strain: Low Risk  (6/29/2024)    Overall Financial Resource Strain (CARDIA)     Difficulty of Paying Living Expenses: Not very hard   Food Insecurity: No Food Insecurity (6/29/2024)    Hunger Vital Sign     Worried About Running Out of Food in the Last Year: Never true     Ran Out of Food in the Last Year: Never true   Transportation Needs: No Transportation Needs (6/29/2024)    TRANSPORTATION NEEDS     Transportation : No   Physical Activity: Inactive (6/29/2024)    Exercise Vital Sign     Days of Exercise per Week: 0 days     Minutes of Exercise per Session: 0 min   Stress: No Stress Concern Present (6/29/2024)    [employer] [place] [employer] - Occupational Stress Questionnaire     Feeling of Stress : Only a little   Recent Concern: Stress - Stress Concern Present  (4/30/2024)    [employer] [place] [employer] - Occupational Stress Questionnaire     Feeling of Stress : To some extent   Housing Stability: Low Risk  (6/29/2024)    Housing Stability Vital Sign     Unable to Pay for Housing in the Last Year: No     Homeless in the Last Year: No       Precautions:     Allergies as of 06/28/2024 - Reviewed 06/28/2024   Allergen Reaction Noted    Enbrel [etanercept] Shortness Of Breath 07/12/2021    Nsaids (non-steroidal anti-inflammatory drug) Other (See Comments) 06/29/2015    Statins-hmg-coa reductase inhibitors Other (See Comments) 06/29/2015    Pcn [penicillins] Rash 06/29/2015       WOC Assessment Details/Treatment     Wound care follow up:     Nursing notified wound care that patient re-injured skin in same spot as previous skin tear. Upon assessment noted partial flap skin tear with partial thickness skin loss. Cleansed area with Vashe and applied a large mepilex foam dressing. Patient tolerated dressing change well.      07/08/24 1028        Wound 06/28/24 2145 Skin Tear Left lower;dorsal Arm   Date First Assessed/Time First Assessed: 06/28/24 2145   Present on Original Admission: Yes  Primary Wound Type: Skin Tear  Side: Left  Orientation: lower;dorsal  Location: Arm   Wound Image    Dressing Appearance Open to air   Drainage Amount Small   Drainage Characteristics/Odor Sanguineous;No odor;Bleeding controlled   Appearance Red;Moist   Tissue loss description Partial thickness   Red (%), Wound Tissue Color 100 %   Periwound Area Ecchymotic;Dry   Wound Length (cm) 5 cm   Wound Width (cm) 4.5 cm   Wound Depth (cm) 0.1 cm   Wound Volume (cm^3) 2.25 cm^3   Wound Surface Area (cm^2) 22.5 cm^2   Care Cleansed with:;Antimicrobial agent   Dressing Applied;Silicone;Foam       07/08/2024

## 2024-07-08 NOTE — TRANSFER OF CARE
Anesthesia Transfer of Care Note    Patient: Nithin Wagner    Procedure(s) Performed: Procedure(s) (LRB):  EGD (ESOPHAGOGASTRODUODENOSCOPY) (N/A)    Patient location: PACU    Anesthesia Type: MAC    Transport from OR: Transported from OR on room air with adequate spontaneous ventilation    Post pain: adequate analgesia    Post assessment: no apparent anesthetic complications and tolerated procedure well    Post vital signs: stable    Level of consciousness: awake and alert    Nausea/Vomiting: no nausea/vomiting    Complications: none    Transfer of care protocol was followed      Last vitals: Visit Vitals  BP (!) 92/56 (BP Location: Right arm, Patient Position: Lying)   Pulse 96   Temp 36.4 °C (97.6 °F) (Skin)   Resp 16   Ht 6' (1.829 m)   Wt 83.1 kg (183 lb 3.2 oz)   SpO2 96%   BMI 24.85 kg/m²

## 2024-07-08 NOTE — ANESTHESIA POSTPROCEDURE EVALUATION
Anesthesia Post Evaluation    Patient: Nithin Wagner    Procedure(s) Performed: Procedure(s) (LRB):  EGD (ESOPHAGOGASTRODUODENOSCOPY) (N/A)    Final Anesthesia Type: general      Patient location during evaluation: PACU  Patient participation: Yes- Able to Participate  Level of consciousness: awake and alert  Post-procedure vital signs: reviewed and stable  Pain management: adequate  Airway patency: patent    PONV status at discharge: No PONV  Anesthetic complications: no      Cardiovascular status: blood pressure returned to baseline  Respiratory status: unassisted and spontaneous ventilation  Hydration status: euvolemic  Follow-up not needed.              Vitals Value Taken Time   /78 07/08/24 0934   Temp 36.8 °C (98.2 °F) 07/08/24 0934   Pulse 95 07/08/24 0934   Resp 17 07/08/24 0934   SpO2 93 % 07/08/24 0934         Event Time   Out of Recovery 07/08/2024 08:48:08         Pain/Renee Score: Pain Rating Prior to Med Admin: 7 (7/8/2024  6:38 AM)  Pain Rating Post Med Admin: 3 (7/7/2024  9:37 AM)  Renee Score: 10 (7/8/2024  8:11 AM)

## 2024-07-08 NOTE — PT/OT/SLP PROGRESS
Physical Therapy Treatment    Patient Name:  Nithin Wagner   MRN:  [id number]    Recommendations:     Discharge Recommendations: Low Intensity Therapy (anticipated LIT, pending pt progress)  Discharge Equipment Recommendations: none  Barriers to discharge: Decreased caregiver support    Assessment:     Nithin Wagner is a 68 y.o. male admitted with a medical diagnosis of Colitis.  He presents with the following impairments/functional limitations: weakness, impaired endurance, impaired self care skills, impaired functional mobility, gait instability, impaired balance, decreased coordination, decreased lower extremity function, decreased safety awareness, pain, decreased ROM, decreased upper extremity function ;pt with fair mobility today, amb'd short distance in room w/ RW and CGA, to bathroom and back, requesting to return to bed, not feeling well, has not had a BM in days. .    Rehab Prognosis: Good; patient would benefit from acute skilled PT services to address these deficits and reach maximum level of function.    Recent Surgery: Procedure(s) (LRB):  EGD (ESOPHAGOGASTRODUODENOSCOPY) (N/A) Day of Surgery    Plan:     During this hospitalization, patient to be seen 5 x/week to address the identified rehab impairments via gait training, therapeutic activities, therapeutic exercises, neuromuscular re-education and progress toward the following goals:    Plan of Care Expires:  07/17/24    Subjective     Chief Complaint: abd discomfort  Patient/Family Comments/goals: pt agreeable to session, requesting to use commode.   Pain/Comfort:  Pain Rating 1:  (did not rate)  Location - Orientation 1: generalized  Location 1: abdomen      Objective:     Communicated with nurse prior to session.  Patient found supine with peripheral IV upon PT entry to room.     General Precautions: Standard, diabetic, fall  Orthopedic Precautions: N/A  Braces: N/A  Respiratory Status: Room air     Functional Mobility:  Bed Mobility:     Supine to  Sit: stand by assistance  Transfers:     Sit to Stand:  contact guard assistance and minimum assistance with rolling walker  Gait: amb'd 10' x 2 w/ RW and CGA      AM-PAC 6 CLICK MOBILITY  Turning over in bed (including adjusting bedclothes, sheets and blankets)?: 3  Sitting down on and standing up from a chair with arms (e.g., wheelchair, bedside commode, etc.): 3  Moving from lying on back to sitting on the side of the bed?: 3  Moving to and from a bed to a chair (including a wheelchair)?: 3  Need to walk in hospital room?: 3  Climbing 3-5 steps with a railing?: 2  Basic Mobility Total Score: 17       Treatment & Education:  Pt perf'd commode t/f to elevated commode chair in bathroom w/ CGA, urinated on floor by accident, needing some assistance for cleaning up. No BM at this time.     Patient left right sidelying with all lines intact, call button in reach, and nurse notified..    GOALS:   Multidisciplinary Problems       Physical Therapy Goals          Problem: Physical Therapy    Goal Priority Disciplines Outcome Goal Variances Interventions   Physical Therapy Goal     PT, PT/OT Progressing     Description: Goals to be met by: 24    Patient will increase functional independence with mobility by performin. Sit<>stand with supervision with RW.  2. Gait x 50 feet with supervision with RW.  3. Ascend/descend 4 step(s) with least restrictive assistive device and uni HR with Shani.   4. Tolerate sitting up in chair x30 min with minimal increase in back pain.                       Time Tracking:     PT Received On: 24  PT Start Time: 1620     PT Stop Time: 1643  PT Total Time (min): 23 min     Billable Minutes: Therapeutic Activity 23    Treatment Type: Treatment  PT/PTA: PTA     Number of PTA visits since last PT visit: 2     2024

## 2024-07-08 NOTE — NURSING
Patient returned from procedure, I assisted transport moving patient from stretcher to bed, when moving patient, small skin tear on patient's left lower arm peeled back further and started bleeding. Covered skin tear with foam dressing and notified wound care. Also, noted that picc line is oozing small amount of blood from insertion site. Will assess picc and notify Picc nurse if needed.

## 2024-07-08 NOTE — NURSING
RAPID RESPONSE NURSE PROACTIVE ROUNDING NOTE     Time of Visit:     Admit Date: 2024  LOS: 9  Code Status: Full Code   Date of Visit: 2024  : 1955  Age: 68 y.o.  Sex: male  Race: White  Bed: B3/B323 A:   MRN: [id number]  Was the patient discharged from an ICU this admission? No   Was the patient discharged from a PACU within last 24 hours?  No  Did the patient receive conscious sedation/general anesthesia in last 24 hours?  No  Was the patient in the ED within the past 24 hours?  No  Was the patient started on NIPPV within the past 24 hours?  No  Attending Physician: CARISA Lozano MD  Primary Service: Networked reference to record PCT     ASSESSMENT     Notified by bedside RN via phone call.  Reason for alert: Administer kpain medication and remove midline    Diagnosis: Colitis    Abnormal Vital Signs: /69 (BP Location: Right arm, Patient Position: Lying)   Pulse 93   Temp 98 °F (36.7 °C) (Oral)   Resp 14   Ht 6' (1.829 m)   Wt 83.1 kg (183 lb 3.2 oz)   SpO2 (!) 94%   BMI 24.85 kg/m²      Clinical Issues:  Pain and Vascular    Patient  has a past medical history of Arthritis, Atrial myxoma, Coronary atherosclerosis, Diabetes mellitus, type 2, Difficult intubation, Heart failure, Hepatitis B, HPV (human papilloma virus) infection, Hyperlipidemia, Hypertension, Non-alcoholic fatty liver disease, Rheumatoid arthritis, Rheumatoid arthritis flare, Squamous cell carcinoma of forehead, and Stroke.           INTERVENTIONS/ RECOMMENDATIONS         Discussed plan of care with RNShirley.    PHYSICIAN ESCALATION     Yes/No  No    Orders received and case discussed with NA.    Disposition: Remain in room 323.    FOLLOW-UP     Call back the Rapid Response Nurse, Giovanni Varela RN at [phone] additional questions or concerns.

## 2024-07-08 NOTE — ANESTHESIA PREPROCEDURE EVALUATION
07/08/2024  Nithin Wagner is a 68 y.o., male.      Pre-op Assessment    I have reviewed the Patient Summary Reports.     I have reviewed the Nursing Notes. I have reviewed the NPO Status.   I have reviewed the Medications.     Review of Systems  Anesthesia Hx:  No problems with previous Anesthesia             Denies Family Hx of Anesthesia complications.   Personal Hx of Anesthesia complications   Difficult Intubation                  Social:  Smoker 1 ppd      Hematology/Oncology:  Hematology Normal   Oncology Normal                                   EENT/Dental:  EENT/Dental Normal           Cardiovascular:     Hypertension, well controlled   CAD   CABG/stent Dysrhythmias   CHF   PVD hyperlipidemia    Recent Echo 60%  Hx SVT                         Pulmonary:         Hx of lobectomy               Renal/:  Chronic Renal Disease renal calculi  Chronic hyponatremia             Hepatic/GI:      Liver Disease, Hepatitis, B MARI  Colitis              Musculoskeletal:  Arthritis   RA       Spine Disorders:             Neurological:   CVA              Chronic Pain Syndrome                         Endocrine:  Diabetes   Hx chronic steroid Rx      Obesity / BMI > 30  Psych:  Psychiatric History anxiety depression                Physical Exam  General: Cooperative and Alert    Airway:  Mallampati: III   Mouth Opening: Normal  TM Distance: Normal  Tongue: Normal  Neck ROM: Normal ROM    Dental:  Intact, Periodontal disease, Caps / Implants        Anesthesia Plan  Type of Anesthesia, risks & benefits discussed:    Anesthesia Type: MAC  Intra-op Monitoring Plan: Standard ASA Monitors  Post Op Pain Control Plan: multimodal analgesia  Induction:  IV  Informed Consent: Informed consent signed with the Patient and all parties understand the risks and agree with anesthesia plan.  All questions answered.   ASA Score:  3  Anesthesia Plan Notes: Preop from chart review      4/3/24 echo. Left Ventricle: The left ventricle is normal in size. Increased wall thickness. There is concentric remodeling. There is normal systolic function with a visually estimated ejection fraction of 55 - 60%. Grade I diastolic dysfunction.  ·  Right Ventricle: Normal right ventricular cavity size. Wall thickness is normal. Right ventricle wall motion  is normal. Systolic function is normal.    Pt on mounjaro, Hx of difficult intubation    Had kyphoplasty here 4/2024                     Ready For Surgery From Anesthesia Perspective.     .

## 2024-07-09 LAB
ALBUMIN SERPL BCP-MCNC: 1.3 G/DL (ref 3.5–5.2)
ALP SERPL-CCNC: 564 U/L (ref 55–135)
ALT SERPL W/O P-5'-P-CCNC: 41 U/L (ref 10–44)
ANION GAP SERPL CALC-SCNC: 5 MMOL/L (ref 8–16)
AST SERPL-CCNC: 67 U/L (ref 10–40)
BACTERIA SPEC ANAEROBE CULT: NORMAL
BASOPHILS # BLD AUTO: 0.05 K/UL (ref 0–0.2)
BASOPHILS NFR BLD: 0.8 % (ref 0–1.9)
BILIRUB SERPL-MCNC: 2.6 MG/DL (ref 0.1–1)
BUN SERPL-MCNC: 8 MG/DL (ref 8–23)
CALCIUM SERPL-MCNC: 7.4 MG/DL (ref 8.7–10.5)
CHLORIDE SERPL-SCNC: 101 MMOL/L (ref 95–110)
CO2 SERPL-SCNC: 25 MMOL/L (ref 23–29)
CREAT SERPL-MCNC: 0.6 MG/DL (ref 0.5–1.4)
DIFFERENTIAL METHOD BLD: ABNORMAL
EOSINOPHIL # BLD AUTO: 0.1 K/UL (ref 0–0.5)
EOSINOPHIL NFR BLD: 1.5 % (ref 0–8)
ERYTHROCYTE [DISTWIDTH] IN BLOOD BY AUTOMATED COUNT: 16.3 % (ref 11.5–14.5)
EST. GFR  (NO RACE VARIABLE): >60 ML/MIN/1.73 M^2
GLUCOSE SERPL-MCNC: 168 MG/DL (ref 70–110)
HCT VFR BLD AUTO: 34.5 % (ref 40–54)
HGB BLD-MCNC: 11.5 G/DL (ref 14–18)
IMM GRANULOCYTES # BLD AUTO: 0.03 K/UL (ref 0–0.04)
IMM GRANULOCYTES NFR BLD AUTO: 0.5 % (ref 0–0.5)
LYMPHOCYTES # BLD AUTO: 1.5 K/UL (ref 1–4.8)
LYMPHOCYTES NFR BLD: 22.5 % (ref 18–48)
MAGNESIUM SERPL-MCNC: 1.9 MG/DL (ref 1.6–2.6)
MCH RBC QN AUTO: 30.3 PG (ref 27–31)
MCHC RBC AUTO-ENTMCNC: 33.3 G/DL (ref 32–36)
MCV RBC AUTO: 91 FL (ref 82–98)
MONOCYTES # BLD AUTO: 0.8 K/UL (ref 0.3–1)
MONOCYTES NFR BLD: 12.2 % (ref 4–15)
NEUTROPHILS # BLD AUTO: 4.1 K/UL (ref 1.8–7.7)
NEUTROPHILS NFR BLD: 62.5 % (ref 38–73)
NRBC BLD-RTO: 0 /100 WBC
OHS QRS DURATION: 98 MS
OHS QTC CALCULATION: 479 MS
PHOSPHATE SERPL-MCNC: 2.8 MG/DL (ref 2.7–4.5)
PLATELET # BLD AUTO: 144 K/UL (ref 150–450)
PMV BLD AUTO: 11.5 FL (ref 9.2–12.9)
POCT GLUCOSE: 185 MG/DL (ref 70–110)
POCT GLUCOSE: 189 MG/DL (ref 70–110)
POCT GLUCOSE: 206 MG/DL (ref 70–110)
POCT GLUCOSE: 229 MG/DL (ref 70–110)
POTASSIUM SERPL-SCNC: 4.7 MMOL/L (ref 3.5–5.1)
PROT SERPL-MCNC: 4.7 G/DL (ref 6–8.4)
RBC # BLD AUTO: 3.79 M/UL (ref 4.6–6.2)
SODIUM SERPL-SCNC: 131 MMOL/L (ref 136–145)
WBC # BLD AUTO: 6.49 K/UL (ref 3.9–12.7)

## 2024-07-09 PROCEDURE — 25000003 PHARM REV CODE 250: Performed by: HOSPITALIST

## 2024-07-09 PROCEDURE — 11000001 HC ACUTE MED/SURG PRIVATE ROOM

## 2024-07-09 PROCEDURE — 25000003 PHARM REV CODE 250: Performed by: NURSE PRACTITIONER

## 2024-07-09 PROCEDURE — 36415 COLL VENOUS BLD VENIPUNCTURE: CPT | Performed by: HOSPITALIST

## 2024-07-09 PROCEDURE — 94761 N-INVAS EAR/PLS OXIMETRY MLT: CPT

## 2024-07-09 PROCEDURE — 84100 ASSAY OF PHOSPHORUS: CPT | Performed by: HOSPITALIST

## 2024-07-09 PROCEDURE — A4216 STERILE WATER/SALINE, 10 ML: HCPCS | Performed by: HOSPITALIST

## 2024-07-09 PROCEDURE — 63600175 PHARM REV CODE 636 W HCPCS: Performed by: INTERNAL MEDICINE

## 2024-07-09 PROCEDURE — 85025 COMPLETE CBC W/AUTO DIFF WBC: CPT | Performed by: HOSPITALIST

## 2024-07-09 PROCEDURE — 51798 US URINE CAPACITY MEASURE: CPT

## 2024-07-09 PROCEDURE — B4185 PARENTERAL SOL 10 GM LIPIDS: HCPCS | Performed by: INTERNAL MEDICINE

## 2024-07-09 PROCEDURE — 97535 SELF CARE MNGMENT TRAINING: CPT

## 2024-07-09 PROCEDURE — 25000003 PHARM REV CODE 250: Performed by: ANESTHESIOLOGY

## 2024-07-09 PROCEDURE — 83735 ASSAY OF MAGNESIUM: CPT | Performed by: HOSPITALIST

## 2024-07-09 PROCEDURE — 25000003 PHARM REV CODE 250: Performed by: INTERNAL MEDICINE

## 2024-07-09 PROCEDURE — 80053 COMPREHEN METABOLIC PANEL: CPT | Performed by: HOSPITALIST

## 2024-07-09 PROCEDURE — 97530 THERAPEUTIC ACTIVITIES: CPT | Mod: CQ

## 2024-07-09 PROCEDURE — 63700000 PHARM REV CODE 250 ALT 637 W/O HCPCS: Performed by: INTERNAL MEDICINE

## 2024-07-09 RX ADMIN — OXYCODONE HYDROCHLORIDE 5 MG: 5 TABLET ORAL at 11:07

## 2024-07-09 RX ADMIN — FLUCONAZOLE 200 MG: 100 TABLET ORAL at 08:07

## 2024-07-09 RX ADMIN — OXYCODONE HYDROCHLORIDE 5 MG: 5 TABLET ORAL at 06:07

## 2024-07-09 RX ADMIN — METOCLOPRAMIDE 10 MG: 10 TABLET ORAL at 04:07

## 2024-07-09 RX ADMIN — INSULIN ASPART 2 UNITS: 100 INJECTION, SOLUTION INTRAVENOUS; SUBCUTANEOUS at 05:07

## 2024-07-09 RX ADMIN — METOCLOPRAMIDE 10 MG: 10 TABLET ORAL at 11:07

## 2024-07-09 RX ADMIN — Medication 1 EACH: at 08:07

## 2024-07-09 RX ADMIN — I.V. FAT EMULSION 250 ML: 20 EMULSION INTRAVENOUS at 08:07

## 2024-07-09 RX ADMIN — METOCLOPRAMIDE 10 MG: 10 TABLET ORAL at 06:07

## 2024-07-09 RX ADMIN — SODIUM CHLORIDE, PRESERVATIVE FREE 10 ML: 5 INJECTION INTRAVENOUS at 12:07

## 2024-07-09 RX ADMIN — OXYCODONE AND ACETAMINOPHEN 1 TABLET: 10; 325 TABLET ORAL at 12:07

## 2024-07-09 RX ADMIN — LAMIVUDINE 150 MG: 150 TABLET, FILM COATED ORAL at 08:07

## 2024-07-09 RX ADMIN — DIGOXIN 0.12 MG: 125 TABLET ORAL at 08:07

## 2024-07-09 RX ADMIN — ONDANSETRON 4 MG: 2 INJECTION INTRAMUSCULAR; INTRAVENOUS at 05:07

## 2024-07-09 RX ADMIN — ASCORBIC ACID, VITAMIN A PALMITATE, CHOLECALCIFEROL, THIAMINE HYDROCHLORIDE, RIBOFLAVIN-5 PHOSPHATE SODIUM, PYRIDOXINE HYDROCHLORIDE, NIACINAMIDE, DEXPANTHENOL, ALPHA-TOCOPHEROL ACETATE, VITAMIN K1, FOLIC ACID, BIOTIN, CYANOCOBALAMIN: 200; 3300; 200; 6; 3.6; 6; 40; 15; 10; 150; 600; 60; 5 INJECTION, SOLUTION INTRAVENOUS at 10:07

## 2024-07-09 RX ADMIN — Medication 1 EACH: at 11:07

## 2024-07-09 RX ADMIN — ESCITALOPRAM OXALATE 20 MG: 10 TABLET ORAL at 08:07

## 2024-07-09 RX ADMIN — ACETAMINOPHEN 650 MG: 325 TABLET, FILM COATED ORAL at 04:07

## 2024-07-09 RX ADMIN — ONDANSETRON 4 MG: 2 INJECTION INTRAMUSCULAR; INTRAVENOUS at 06:07

## 2024-07-09 RX ADMIN — HYDROXYZINE HYDROCHLORIDE 50 MG: 25 TABLET ORAL at 09:07

## 2024-07-09 RX ADMIN — OXYCODONE AND ACETAMINOPHEN 1 TABLET: 10; 325 TABLET ORAL at 08:07

## 2024-07-09 RX ADMIN — PANTOPRAZOLE SODIUM 40 MG: 40 INJECTION, POWDER, LYOPHILIZED, FOR SOLUTION INTRAVENOUS at 08:07

## 2024-07-09 RX ADMIN — Medication 1 EACH: at 04:07

## 2024-07-09 RX ADMIN — Medication 10 ML: at 11:07

## 2024-07-09 RX ADMIN — ONDANSETRON 4 MG: 2 INJECTION INTRAMUSCULAR; INTRAVENOUS at 12:07

## 2024-07-09 RX ADMIN — SODIUM CHLORIDE, PRESERVATIVE FREE 10 ML: 5 INJECTION INTRAVENOUS at 06:07

## 2024-07-09 RX ADMIN — OXYCODONE AND ACETAMINOPHEN 1 TABLET: 10; 325 TABLET ORAL at 09:07

## 2024-07-09 RX ADMIN — ONDANSETRON 4 MG: 2 INJECTION INTRAMUSCULAR; INTRAVENOUS at 11:07

## 2024-07-09 NOTE — PT/OT/SLP PROGRESS
Physical Therapy Treatment    Patient Name:  Nithin Wagner   MRN:  [id number]    Recommendations:     Discharge Recommendations: Low Intensity Therapy (TBD, will need inc assistance if going home)  Discharge Equipment Recommendations: none  Barriers to discharge: Decreased caregiver support    Assessment:     Nithin Wagner is a 68 y.o. male admitted with a medical diagnosis of Colitis.  He presents with the following impairments/functional limitations: weakness, impaired endurance, impaired self care skills, impaired functional mobility, gait instability, impaired balance, decreased coordination, decreased lower extremity function, decreased upper extremity function, decreased safety awareness, pain, decreased ROM, edema, impaired cardiopulmonary response to activity ;pt with fair mobility today, limited by pt c/o weakness/dizziness when trying to amb. In room. (Nsg. Reports pt refusing to wear CHELE hose).     Rehab Prognosis: Good; patient would benefit from acute skilled PT services to address these deficits and reach maximum level of function.    Recent Surgery: Procedure(s) (LRB):  EGD (ESOPHAGOGASTRODUODENOSCOPY) (N/A) 1 Day Post-Op    Plan:     During this hospitalization, patient to be seen 5 x/week to address the identified rehab impairments via gait training, therapeutic activities, therapeutic exercises, neuromuscular re-education and progress toward the following goals:    Plan of Care Expires:  07/17/24    Subjective     Chief Complaint: weakness/dizziness when standing/amb. , and back pain  Patient/Family Comments/goals: pt agreeable to session, asking for pain meds afterwards.   Pain/Comfort:  Pain Rating 1: 6/10  Location - Orientation 1: generalized  Location 1: back  Pain Addressed 1: Reposition, Distraction, Nurse notified  Pain Rating Post-Intervention 1: 6/10      Objective:     Communicated with nurse prior to session.  Patient found right sidelying with peripheral IV upon PT entry to room.      General Precautions: Standard, diabetic, fall  Orthopedic Precautions: N/A  Braces: N/A  Respiratory Status: Room air     Functional Mobility:  Bed Mobility:     Supine to Sit: supervision  Sit to Supine: minimum assistance and at LE's  Transfers:     Sit to Stand:  contact guard assistance with rolling walker  Gait: amb'd 15' w/ RW and CGA, limited by dizziness.       AM-PAC 6 CLICK MOBILITY  Turning over in bed (including adjusting bedclothes, sheets and blankets)?: 4  Sitting down on and standing up from a chair with arms (e.g., wheelchair, bedside commode, etc.): 3  Moving from lying on back to sitting on the side of the bed?: 4  Moving to and from a bed to a chair (including a wheelchair)?: 3  Need to walk in hospital room?: 3  Climbing 3-5 steps with a railing?: 2  Basic Mobility Total Score: 19       Treatment & Education:  BP seated at EOB: 99/65  BP after amb.15': 107/64, HR: 115    Patient left right sidelying with all lines intact, call button in reach, and nurse notified..    GOALS:   Multidisciplinary Problems       Physical Therapy Goals          Problem: Physical Therapy    Goal Priority Disciplines Outcome Goal Variances Interventions   Physical Therapy Goal     PT, PT/OT Progressing     Description: Goals to be met by: 24    Patient will increase functional independence with mobility by performin. Sit<>stand with supervision with RW.  2. Gait x 50 feet with supervision with RW.  3. Ascend/descend 4 step(s) with least restrictive assistive device and uni HR with Shani.   4. Tolerate sitting up in chair x30 min with minimal increase in back pain.                       Time Tracking:     PT Received On: 24  PT Start Time: 1540     PT Stop Time: 1556  PT Total Time (min): 16 min     Billable Minutes: Therapeutic Activity 16    Treatment Type: Treatment  PT/PTA: PTA     Number of PTA visits since last PT visit: 3     2024

## 2024-07-09 NOTE — SUBJECTIVE & OBJECTIVE
Interval History: No acute events overnight. Mild pain; discussed EGD findings.    Review of Systems   Constitutional:  Negative for chills and fever.   Respiratory:  Negative for cough and shortness of breath.    Cardiovascular:  Negative for chest pain and palpitations.   Gastrointestinal:  Positive for abdominal pain. Negative for nausea and vomiting.     Objective:     Vital Signs (Most Recent):  Temp: 98.5 °F (36.9 °C) (07/08/24 2029)  Pulse: 94 (07/08/24 2029)  Resp: 15 (07/08/24 2156)  BP: 115/74 (07/08/24 2029)  SpO2: (!) 93 % (07/08/24 2029) Vital Signs (24h Range):  Temp:  [97.6 °F (36.4 °C)-98.5 °F (36.9 °C)] 98.5 °F (36.9 °C)  Pulse:  [] 94  Resp:  [14-18] 15  SpO2:  [93 %-97 %] 93 %  BP: ()/(56-85) 115/74     Weight: 83.1 kg (183 lb 3.2 oz)  Body mass index is 24.85 kg/m².    Intake/Output Summary (Last 24 hours) at 7/8/2024 2315  Last data filed at 7/8/2024 1800  Gross per 24 hour   Intake 1450.59 ml   Output 1825 ml   Net -374.41 ml         Physical Exam  Vitals and nursing note reviewed.   Constitutional:       General: He is not in acute distress.     Appearance: He is well-developed.   HENT:      Head: Normocephalic and atraumatic.   Eyes:      General:         Right eye: No discharge.         Left eye: No discharge.      Conjunctiva/sclera: Conjunctivae normal.   Cardiovascular:      Rate and Rhythm: Normal rate.      Pulses: Normal pulses.   Pulmonary:      Effort: Pulmonary effort is normal. No respiratory distress.   Abdominal:      General: There is distension.      Palpations: Abdomen is soft.      Tenderness: There is abdominal tenderness.   Musculoskeletal:         General: Normal range of motion.      Right lower leg: Edema present.      Left lower leg: Edema present.   Skin:     General: Skin is warm and dry.   Neurological:      Mental Status: He is alert and oriented to person, place, and time.           Significant Labs:   CBC:  Recent Labs   Lab 07/06/24  0443  07/07/24 0449 07/08/24 0449   WBC 4.86 4.54 5.78   HGB 12.8* 12.1* 12.1*   HCT 37.0* 34.6* 36.3*    162 144*   GRAN 64.2  3.1 53.9  2.5 63.9  3.7   LYMPH 22.8  1.1 29.5  1.3 21.3  1.2   MONO 9.3  0.5 12.6  0.6 12.6  0.7   EOS 0.1 0.1 0.1   BASO 0.06 0.06 0.04   CMP:  Recent Labs   Lab 07/06/24 0443 07/07/24 0449 07/08/24 0449   * 130* 130*   K 3.9 4.1 4.4   CL 99 101 101   CO2 23 23 25   BUN 5* 5* 7*   CREATININE 0.7 0.6 0.6   * 164* 192*   CALCIUM 7.3* 7.3* 7.2*   MG 1.7 1.9 1.8   PHOS 3.1 2.6* 2.4*   ALKPHOS 400* 412* 483*   AST 77* 62* 61*   ALT 60* 51* 46*   BILITOT 3.2* 2.7* 2.6*   PROT 4.5* 4.4* 4.7*   ALBUMIN 1.4* 1.3* 1.3*   ANIONGAP 8 6* 4*      Significant Imaging: I have reviewed and interpreted all pertinent imaging results/findings within the past 24 hours.

## 2024-07-09 NOTE — SUBJECTIVE & OBJECTIVE
Interval History: No acute events overnight. Mild pain; able to eat more, ate about 25% of his meals from yesterday today.  Feeling a little better.    Review of Systems   Constitutional:  Negative for chills and fever.   Respiratory:  Negative for cough and shortness of breath.    Cardiovascular:  Negative for chest pain and palpitations.   Gastrointestinal:  Positive for abdominal pain. Negative for nausea and vomiting.     Objective:     Vital Signs (Most Recent):  Temp: 98.1 °F (36.7 °C) (07/09/24 1136)  Pulse: 92 (07/09/24 1136)  Resp: 18 (07/09/24 1149)  BP: 111/69 (07/09/24 1136)  SpO2: (!) 93 % (07/09/24 1136) Vital Signs (24h Range):  Temp:  [97.6 °F (36.4 °C)-98.5 °F (36.9 °C)] 98.1 °F (36.7 °C)  Pulse:  [] 92  Resp:  [14-18] 18  SpO2:  [92 %-95 %] 93 %  BP: (103-117)/(62-80) 111/69     Weight: 83.1 kg (183 lb 3.2 oz)  Body mass index is 24.85 kg/m².    Intake/Output Summary (Last 24 hours) at 7/9/2024 1613  Last data filed at 7/9/2024 1508  Gross per 24 hour   Intake 255.59 ml   Output 1075 ml   Net -819.41 ml         Physical Exam  Vitals and nursing note reviewed.   Constitutional:       General: He is not in acute distress.     Appearance: He is well-developed.   HENT:      Head: Normocephalic and atraumatic.   Eyes:      General:         Right eye: No discharge.         Left eye: No discharge.      Conjunctiva/sclera: Conjunctivae normal.   Cardiovascular:      Rate and Rhythm: Normal rate.      Pulses: Normal pulses.   Pulmonary:      Effort: Pulmonary effort is normal. No respiratory distress.   Abdominal:      General: There is distension.      Palpations: Abdomen is soft.      Tenderness: There is abdominal tenderness.   Musculoskeletal:         General: Normal range of motion.      Right lower leg: Edema present.      Left lower leg: Edema present.   Skin:     General: Skin is warm and dry.   Neurological:      Mental Status: He is alert and oriented to person, place, and time.            Significant Labs:   CBC:  Recent Labs   Lab 07/07/24 0449 07/08/24 0449 07/09/24  0518   WBC 4.54 5.78 6.49   HGB 12.1* 12.1* 11.5*   HCT 34.6* 36.3* 34.5*    144* 144*   GRAN 53.9  2.5 63.9  3.7 62.5  4.1   LYMPH 29.5  1.3 21.3  1.2 22.5  1.5   MONO 12.6  0.6 12.6  0.7 12.2  0.8   EOS 0.1 0.1 0.1   BASO 0.06 0.04 0.05   CMP:  Recent Labs   Lab 07/07/24 0449 07/08/24 0449 07/09/24  0518   * 130* 131*   K 4.1 4.4 4.7    101 101   CO2 23 25 25   BUN 5* 7* 8   CREATININE 0.6 0.6 0.6   * 192* 168*   CALCIUM 7.3* 7.2* 7.4*   MG 1.9 1.8 1.9   PHOS 2.6* 2.4* 2.8   ALKPHOS 412* 483* 564*   AST 62* 61* 67*   ALT 51* 46* 41   BILITOT 2.7* 2.6* 2.6*   PROT 4.4* 4.7* 4.7*   ALBUMIN 1.3* 1.3* 1.3*   ANIONGAP 6* 4* 5*      Significant Imaging: I have reviewed and interpreted all pertinent imaging results/findings within the past 24 hours.

## 2024-07-09 NOTE — PLAN OF CARE
07/09/24 1254   Discharge Reassessment   Assessment Type Discharge Planning Reassessment   Did the patient's condition or plan change since previous assessment? No   Discharge Plan discussed with: Patient   Communicated ALEXANDER with patient/caregiver Date not available/Unable to determine   Discharge Plan A Home Health   Discharge Plan B Home Health   DME Needed Upon Discharge  none   Transition of Care Barriers None   Why the patient remains in the hospital Requires continued medical care   Post-Acute Status   Discharge Delays None known at this time

## 2024-07-09 NOTE — PROGRESS NOTES
[facility] Medicine  Progress Note    Patient Name: Nithin Wagner  MRN: [id number]  Patient Class: IP- Inpatient   Admission Date: 6/28/2024  Length of Stay: 10 days  Attending Physician: Darby Obrien MD  Primary Care Provider: Tushar Drew MD        Subjective:     Principal Problem:Colitis        HPI:  Nithin Wagner is a 68 y.o. male with a past medical history of hepatitis-B, hypertension, diastolic heart failure, diabetes, rheumatoid arthritis, hyponatremia, s/p s/p T11 and L3 kyphoplasty on 4/22/2024 who presents to the ED with lower abdominal pain recurrent onset 4 days ago.  Patient reports associated nausea and vomiting and states he has been experiencing intermittent diarrhea over the past several months.  He denies blood in stool.  Patient  was seen here on i 06/26/2024 for emesis and weakness. Workup showed findings suggestive of colitis and dehydration. He was offered admission at that time but he stated he would prefer to go home. Stated he went home and was unable to hold down significant fluids and came back.  Reports subjective fever a few days ago.  Had a normal bowel movement this morning     ED workup significant for unremarkable CBC, mild hyponatremia 132, T bili 4.1, albumin 1.6,  and .  Lactic acid 1.2.  CT abdomen performed 06/26/2024 showed diffuse colitis.  Patient was started on IV Cipro and Flagyl in the ED and referred to Hospital Medicine.  Patient will be admitted for further evaluation and management.    Overview/Hospital Course:  Mr. Wagner presented with abdominal pain with nausea and vomiting.  Placed in observation status.  Treatment initiated for colitis with IV Cipro and Flagyl, IV fluids, antiemetics and supportive care.  GI consulted. Underwent paracentesis. LFTs gradually improved though appetite remained poor. Completed course of antibiotic therapy. Autoimmune workup for liver disease unremarkable. Scheduled antiemetics with  improvement in symptoms though not in appetite. Started PPN. EGD performed 07/08 demonstrating white plaques in esophagus and gastritis; started fluconazole.    Interval History: No acute events overnight. Mild pain; able to eat more, ate about 25% of his meals from yesterday today.  Feeling a little better.    Review of Systems   Constitutional:  Negative for chills and fever.   Respiratory:  Negative for cough and shortness of breath.    Cardiovascular:  Negative for chest pain and palpitations.   Gastrointestinal:  Positive for abdominal pain. Negative for nausea and vomiting.     Objective:     Vital Signs (Most Recent):  Temp: 98.1 °F (36.7 °C) (07/09/24 1136)  Pulse: 92 (07/09/24 1136)  Resp: 18 (07/09/24 1149)  BP: 111/69 (07/09/24 1136)  SpO2: (!) 93 % (07/09/24 1136) Vital Signs (24h Range):  Temp:  [97.6 °F (36.4 °C)-98.5 °F (36.9 °C)] 98.1 °F (36.7 °C)  Pulse:  [] 92  Resp:  [14-18] 18  SpO2:  [92 %-95 %] 93 %  BP: (103-117)/(62-80) 111/69     Weight: 83.1 kg (183 lb 3.2 oz)  Body mass index is 24.85 kg/m².    Intake/Output Summary (Last 24 hours) at 7/9/2024 1613  Last data filed at 7/9/2024 1508  Gross per 24 hour   Intake 255.59 ml   Output 1075 ml   Net -819.41 ml         Physical Exam  Vitals and nursing note reviewed.   Constitutional:       General: He is not in acute distress.     Appearance: He is well-developed.   HENT:      Head: Normocephalic and atraumatic.   Eyes:      General:         Right eye: No discharge.         Left eye: No discharge.      Conjunctiva/sclera: Conjunctivae normal.   Cardiovascular:      Rate and Rhythm: Normal rate.      Pulses: Normal pulses.   Pulmonary:      Effort: Pulmonary effort is normal. No respiratory distress.   Abdominal:      General: There is distension.      Palpations: Abdomen is soft.      Tenderness: There is abdominal tenderness.   Musculoskeletal:         General: Normal range of motion.      Right lower leg: Edema present.      Left lower  leg: Edema present.   Skin:     General: Skin is warm and dry.   Neurological:      Mental Status: He is alert and oriented to person, place, and time.           Significant Labs:   CBC:  Recent Labs   Lab 07/07/24 0449 07/08/24 0449 07/09/24  0518   WBC 4.54 5.78 6.49   HGB 12.1* 12.1* 11.5*   HCT 34.6* 36.3* 34.5*    144* 144*   GRAN 53.9  2.5 63.9  3.7 62.5  4.1   LYMPH 29.5  1.3 21.3  1.2 22.5  1.5   MONO 12.6  0.6 12.6  0.7 12.2  0.8   EOS 0.1 0.1 0.1   BASO 0.06 0.04 0.05   CMP:  Recent Labs   Lab 07/07/24 0449 07/08/24 0449 07/09/24  0518   * 130* 131*   K 4.1 4.4 4.7    101 101   CO2 23 25 25   BUN 5* 7* 8   CREATININE 0.6 0.6 0.6   * 192* 168*   CALCIUM 7.3* 7.2* 7.4*   MG 1.9 1.8 1.9   PHOS 2.6* 2.4* 2.8   ALKPHOS 412* 483* 564*   AST 62* 61* 67*   ALT 51* 46* 41   BILITOT 2.7* 2.6* 2.6*   PROT 4.4* 4.7* 4.7*   ALBUMIN 1.3* 1.3* 1.3*   ANIONGAP 6* 4* 5*      Significant Imaging: I have reviewed and interpreted all pertinent imaging results/findings within the past 24 hours.    Assessment/Plan:      * Colitis  Abnormal LFTs  Hypokalemia  - Patient with intermittent nausea, vomiting and diarrhea.  Reports subjective fever.  CT abdomen with findings concerning for colitis.   - Completed 7 days of ciprofloxacin/metronidazole.  - Liver function tests with some improvement, cholestatic pattern initially.  - Patient with some clinical improvement in symptoms and stool studies negative for C.diff  - MRCP was negative for obstruction, noted gallbladder distention and large amount of ascites  - Case discussed with GI; paracentesis 07/02 demonstrating SAAG 1.1. NITIN, anti-mitochondrial, ASMA negative.  - Replete lytes as required. Monitor PO intake. Continue ondansetron, metoclopramide, continue PPN.  - EGD remarkable for Candida esophagitis and patient was started on fluconazole on 7/8  - Clinically improving with decreased pain and increased p.o. intake, continue PPN until and  oral intake is adequate, anticipate will be able to wean off in the next 1-2 days  - Appreciate GI assistance.    SVT (supraventricular tachycardia)  - History SVT noted.  Followed by Cardiology, Dr. Doshi, outpatient.  States he takes digoxin and has had no recent episodes of SVT  - Continue digoxin 0.125 mg p.o. daily  - Monitor on telemetry    Type 2 diabetes mellitus, with long-term current use of insulin  - Recent HgbA1c 7.4 performed on 3/2024.  Patient has not been taking insulin and was on tirzepeptide and stopped taking in April  - Glucose level reviewed, in normal to slightly elevated range  - Continue monitor with Accu-Cheks q.a.c. and q.h.s. and give low-dose sliding scale insulin as necessary    Chronic diastolic heart failure  - Recent Echo performed 04/03/2024 showed EF 55-60%.  Patient states he no longer takes Lasix.  Lower extremity edema noted, patient denies shortness of breath  - Clinically appears near euvolemic though with anasarca due to liver disease / hypoalbuminemia.    NAFLD (nonalcoholic fatty liver disease)  - History noted.  LFTs on admission elevated from baseline with , T bili 4.1,  and .  - As discussed above    Hepatitis B core antibody positive  - Patient states he was exposed to hep B while working in [place] in the 1990s and is on prophylaxis with lamivudine   - Continue lamivudine daily      VTE Risk Mitigation (From admission, onward)           Ordered     Place CHELE hose  Until discontinued         07/03/24 2056     IP VTE HIGH RISK PATIENT  Once         06/28/24 2107     Place sequential compression device  Until discontinued         06/28/24 2107                        Darby Obrien MD  Department of Hospital Medicine   [facility] ([place])

## 2024-07-09 NOTE — PROGRESS NOTES
Gastroenterology Progress Note    Active Hospital Problems    Abnormal LFTs (liver function tests)      Severe protein-calorie malnutrition      *Colitis      SVT (supraventricular tachycardia)      Chronic diastolic heart failure      Type 2 diabetes mellitus, with long-term current use of insulin      NAFLD (nonalcoholic fatty liver disease)      Hepatitis B core antibody positive        Subjective:  Patient seen and examined.  The patient is doing better this AM.  Eating 25% of meal tray.  Less abdominal discomfort.  EGD findings 7/8/24 per Dr. Win noted, fluconazole started.    Reviews of Systems:  General:  Negative for fever or chills  Cardiovascular:  Negative for chest pain, shortness of breath, palpitations    Physical Exam    Vitals:  Vital Signs (Most Recent):  Temp: 98.4 °F (36.9 °C) (07/09/24 0752)  Pulse: 107 (07/09/24 0752)  Resp: 16 (07/09/24 0752)  BP: 109/62 (07/09/24 0752)  SpO2: (!) 93 % (07/09/24 0752) Vital Signs (24h Range):  Temp:  [97.6 °F (36.4 °C)-98.5 °F (36.9 °C)] 98.4 °F (36.9 °C)  Pulse:  [] 107  Resp:  [14-18] 16  SpO2:  [92 %-96 %] 93 %  BP: ()/(59-80) 109/62     GEN: Well developed, well nourished in no apparent distress   HENT: Normocephalic, anicteric sclera   Cardiovascular: Regular rate and rhythm. No murmurs appreciated.   Chest: Non-labored respirations. Breath sounds equal   Abdomen: soft, distended, no masses  Psych: Appropriate mood and affect.   Extermities: No C/C/E. 2+ dorsalis pedis pulses bilaterally  Skin: No new visible or palpable lesions.      Medications/Infusions:  Current Facility-Administered Medications   Medication Dose Route Frequency Provider Last Rate Last Admin    acetaminophen tablet 650 mg  650 mg Oral Q8H PRN Valeria Patel, NP        Amino acid 4.25% - dextrose 5% (CLINIMIX-E) solution with additives (1L provides 42.5 gm AA, 50 gm CHO (170 kcal/L dextrose), Na 35, K 30, Mg 5, Ca 4.5, Acetate 70, Cl 39, Phos 15)   Intravenous  Continuous CARISA Lozano MD 85 mL/hr at 07/08/24 2157 New Bag at 07/08/24 2157    dextromethorphan-guaiFENesin  mg/5 ml liquid 10 mL  10 mL Oral Q4H PRN CARISA Lozano MD        dextrose 10% bolus 125 mL 125 mL  12.5 g Intravenous PRN Valeria Patel, NP        dextrose 10% bolus 250 mL 250 mL  25 g Intravenous PRN Valeria Patel, BASIA        digoxin tablet 0.125 mg  0.125 mg Oral Daily Valeria Patel, NP   0.125 mg at 07/08/24 1058    EScitalopram oxalate tablet 20 mg  20 mg Oral QHS Valeria Patel, NP   20 mg at 07/08/24 2156    fat emulsion 20% infusion 250 mL  250 mL Intravenous Daily CARISA Lozano MD        fat emulsion 20% infusion 250 mL  250 mL Intravenous Daily CARISA Lozano MD        fluconazole tablet 200 mg  200 mg Oral Daily CARISA Lozano MD        glucagon (human recombinant) injection 1 mg  1 mg Intramuscular PRN Valeria Patel, NP        glucagon (human recombinant) injection 1 mg  1 mg Intramuscular PRN Chilo Urban MD        glucose chewable tablet 16 g  16 g Oral PRN Valeria Patel, NP   16 g at 07/05/24 0012    glucose chewable tablet 24 g  24 g Oral PRN Valeria Patel NP        HYDROmorphone (PF) injection 0.4 mg  0.4 mg Intravenous Q5 Min PRN Chilo Urban MD        hydrOXYzine HCL tablet 50 mg  50 mg Oral QID PRN CARISA Lozano MD   50 mg at 07/05/24 0654    insulin aspart U-100 pen 0-5 Units  0-5 Units Subcutaneous QID (AC + HS) PRN Valeria Patel NP   2 Units at 07/08/24 1623    lactobacillus acidophilus & bulgar 100 million cell packet 1 each  1 packet Oral TID  CARISA Lozano MD   1 each at 07/08/24 1100    lamiVUDine tablet 150 mg  150 mg Oral QHS Valeria Patel, NP   150 mg at 07/08/24 2156    LIDOcaine 5 % patch 1 patch  1 patch Transdermal Q24H Valeria Patel, NP   1 patch at 07/07/24 2129    loperamide 1 mg/7.5 mL solution 1 mg  1 mg Oral QID PRN Guider, MD Janet        melatonin tablet 6 mg  6 mg Oral  Nightly PRN Valeria Patel, NP   6 mg at 07/03/24 2354    meperidine (PF) injection 12.5 mg  12.5 mg Intravenous Once PRN Chilo Urban MD        metoclopramide HCl tablet 10 mg  10 mg Oral TID AC CARISA Lozano MD   10 mg at 07/09/24 0616    naloxone 0.4 mg/mL injection 0.02 mg  0.02 mg Intravenous PRN Valeria Patel, BASIA        ondansetron injection 4 mg  4 mg Intravenous Q6H CARISA Lozano MD   4 mg at 07/09/24 0610    ondansetron injection 4 mg  4 mg Intravenous Daily PRN Chilo Urban MD        oxyCODONE immediate release tablet 5 mg  5 mg Oral Q3H PRN Chilo Urban MD   5 mg at 07/09/24 0610    oxyCODONE-acetaminophen  mg per tablet 1 tablet  1 tablet Oral Q4H PRN CARISA Lozano MD   1 tablet at 07/09/24 0020    pantoprazole injection 40 mg  40 mg Intravenous Daily Janet Win MD   40 mg at 07/08/24 1057    prochlorperazine injection Soln 5 mg  5 mg Intravenous Q6H PRN CARISA Lozano MD   5 mg at 07/05/24 0241    sodium chloride 0.9% flush 10 mL  10 mL Intravenous Q6H Darby Obrien MD   10 mL at 07/09/24 0612    And    sodium chloride 0.9% flush 10 mL  10 mL Intravenous PRN Darby Obrien MD   10 mL at 07/03/24 0606    sodium chloride 0.9% flush 3 mL  3 mL Intravenous PRN Chilo Urban MD           Intake and Output:    Intake/Output Summary (Last 24 hours) at 7/9/2024 0810  Last data filed at 7/9/2024 0629  Gross per 24 hour   Intake 1310.59 ml   Output 1875 ml   Net -564.41 ml       Labs:     Latest Reference Range & Units 07/09/24 05:18   WBC 3.90 - 12.70 K/uL 6.49   RBC 4.60 - 6.20 M/uL 3.79 (L)   Hemoglobin 14.0 - 18.0 g/dL 11.5 (L)   Hematocrit 40.0 - 54.0 % 34.5 (L)   MCV 82 - 98 fL 91   MCH 27.0 - 31.0 pg 30.3   MCHC 32.0 - 36.0 g/dL 33.3   RDW 11.5 - 14.5 % 16.3 (H)   Platelet Count 150 - 450 K/uL 144 (L)   (L): Data is abnormally low  (H): Data is abnormally high   Latest Reference Range & Units 07/09/24 05:18   Sodium 136 - 145 mmol/L 131 (L)    Potassium 3.5 - 5.1 mmol/L 4.7   Chloride 95 - 110 mmol/L 101   CO2 23 - 29 mmol/L 25   Anion Gap 8 - 16 mmol/L 5 (L)   BUN 8 - 23 mg/dL 8   Creatinine 0.5 - 1.4 mg/dL 0.6   eGFR >60 mL/min/1.73 m^2 >60   Glucose 70 - 110 mg/dL 168 (H)   Calcium 8.7 - 10.5 mg/dL 7.4 (L)   Phosphorus Level 2.7 - 4.5 mg/dL 2.8   Magnesium  1.6 - 2.6 mg/dL 1.9   ALP 55 - 135 U/L 564 (H)   PROTEIN TOTAL 6.0 - 8.4 g/dL 4.7 (L)   Albumin 3.5 - 5.2 g/dL 1.3 (L)   BILIRUBIN TOTAL 0.1 - 1.0 mg/dL 2.6 (H)   AST 10 - 40 U/L 67 (H)   ALT 10 - 44 U/L 41   (L): Data is abnormally low  (H): Data is abnormally high    Imaging and other studies:    No new    Assessment:    Patient is a 68 y.o. male with a history of HTN, DM2, HLD, Hep B - cleared (Surface Ag neg), RA, NAFLD, prior CVA, CAD s/p PCI and diastolic CHF w/ atrial myxoma who presented with weakness, N/V, diarrhea and dehydration.  GI consulted for abnormal LFTs.  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 Monday if still in the hospital  - no evidence of HE, but continue to monitor for mental status changes  - continue to optimize nutrition, encourage oral intake    Little else to actively offer at this time.  Please call should questions arise.  Will follow peripherally.

## 2024-07-09 NOTE — PT/OT/SLP PROGRESS
"Occupational Therapy   Treatment    Name: Nithin Wagner  MRN: [id number]  Admitting Diagnosis:  Colitis  1 Day Post-Op    Recommendations:     Discharge Recommendations: Low Intensity Therapy (continued assist from family/paid helpers)  Discharge Equipment Recommendations:   (None anticipated)  Barriers to discharge:   (Current functional level)    Assessment:     Nithin Wagner is a 68 y.o. male with a medical diagnosis of Colitis.  He presents with the following performance deficits affecting function are weakness, impaired endurance, impaired self care skills, impaired functional mobility, gait instability, impaired balance, decreased coordination, decreased upper extremity function, decreased lower extremity function, decreased safety awareness, pain, decreased ROM, edema, impaired cardiopulmonary response to activity.     Pt pleasant and with fair participation but limited by pain and symptomatic orthostatic hypotension this date, pt declining use of CHELE hose 2/2 LE pain. RN aware     Rehab Prognosis:  Fair; patient would benefit from acute skilled OT services to address these deficits and reach maximum level of function.       Plan:     Patient to be seen 4 x/week to address the above listed problems via self-care/home management, therapeutic activities, therapeutic exercises  Plan of Care Expires: 08/02/24  Plan of Care Reviewed with: patient    Subjective     Chief Complaint: dizziness, pain  Patient/Family Comments/goals: "I'm starting to feel sedated" Pt falling asleep mid conversation at beginning of session, later expressing concern re: lack of sleep as well as effect of meds (pt reporting also falling asleep mid conversation with MD); RN notified via secure chat  Pain/Comfort:  Pain Rating 1: 6/10  Location 1: back  Pain Addressed 1: Reposition, Distraction, Cessation of Activity, Nurse notified  Pain Rating Post-Intervention 1:  (pt reporting increasing back pain while sitting EOB, returned to " supine)    Vitals:  Sitting EOB: /64, HR 99  Standing after 1 min: BP 87/61, ; pt reporting dizziness  Sitting EOB following standing, brief rest break: /69, ; pt reporting continued dizziness (and increasing back pain), returned to supine; pt comfortable at rest; RN notified re: vitals via secure chat    Objective:     Communicated with: BE Eason prior to session.  Patient found HOB elevated with peripheral IV (Midline) upon OT entry to room.    General Precautions: Standard, diabetic, fall    Orthopedic Precautions:N/A  Braces: N/A  Respiratory Status: Room air     Occupational Performance:     Bed Mobility:    SBA sup>sit, HOB partially elevated/rails  Mod A sit>sup (assist with LE), cues for technique   SBA lateral scoot to R along EOB  SBA scoot HOB in supine, bed in trendelenburg, rails, cues for techhique    Functional Mobility/Transfers:  Min A sit<>stand from bed with RW, cues for safe hand placement  Functional Mobility: deferred 2/2 symptomatic orthostatic hypotension; vitals as noted above; pt declined use of CHELE hose 2/2 LE pain (pt with edema in B feet, RN aware), abdominal binder likely not an option 2/2 abdominal discomfort (pt stating painful to touch)    Activities of Daily Living:  Grooming: initially agreeable to complete standing at sink, limited by symptomatic orthostatic hypotension this date; setup to complete brushing teeth sitting EOB but pt reporting increasing back pain and requesting to lay back down; washed face with setup, HOB elevated    Lower Body Dressing: setup to don slip on shoes seated EOB; mod a to adjust shorts and fasten waist tie while in stance with RW/SBA        [facility] 6 Click ADL: 16    Treatment & Education:  ADL/moblity as detailed above  Education on role of OT, POC, importance of and safety with EOB/OOB activity, orthostatics, use of call light      Patient left HOB elevated with all lines intact, call button in reach, and RN notified    GOALS:    Multidisciplinary Problems       Occupational Therapy Goals          Problem: Occupational Therapy    Goal Priority Disciplines Outcome Interventions   Occupational Therapy Goal     OT, PT/OT Progressing    Description: 1. Commode T/F with RW with SPV  2. Toileting with SPV  3. Two G/H tasks standing at sink with RW and SPV  4. UB dressing with Setup  5. LB dressing adhering to spinal precautions with Setup/SPV, AE PRN                       Time Tracking:     OT Date of Treatment: 07/09/24  OT Start Time: 1147  OT Stop Time: 1217  OT Total Time (min): 30 min    Billable Minutes:Self Care/Home Management 30    OT/MARS: OT          7/9/2024

## 2024-07-09 NOTE — ASSESSMENT & PLAN NOTE
Abnormal LFTs  Hypokalemia  - Patient with intermittent nausea, vomiting and diarrhea.  Reports subjective fever.  CT abdomen with findings concerning for colitis.   - Completed 7 days of ciprofloxacin/metronidazole.  - Liver function tests with some improvement, cholestatic pattern initially.  - Patient with some clinical improvement in symptoms and stool studies negative for C.diff  - MRCP was negative for obstruction, noted gallbladder distention and large amount of ascites  - Case discussed with GI; paracentesis 07/02 demonstrating SAAG 1.1. NITIN, anti-mitochondrial, ASMA negative.  - Replete lytes as required. Monitor PO intake. Continue ondansetron, metoclopramide, continue PPN.  - EGD remarkable for Candida esophagitis and patient was started on fluconazole on 7/8  - Clinically improving with decreased pain and increased p.o. intake, continue PPN until and oral intake is adequate, anticipate will be able to wean off in the next 1-2 days  - Appreciate GI assistance.

## 2024-07-09 NOTE — ASSESSMENT & PLAN NOTE
Abnormal LFTs  Hypokalemia  - Patient with intermittent nausea, vomiting and diarrhea.  Reports subjective fever.  CT abdomen with findings concerning for colitis.   - Completed 7 days of ciprofloxacin/metronidazole.  - Liver function tests with some improvement, cholestatic pattern initially.  - Patient with some clinical improvement in symptoms and stool studies negative for C.diff  - MRCP was negative for obstruction, noted gallbladder distention and large amount of ascites  - Case discussed with GI; paracentesis 07/02 demonstrating SAAG 1.1. NITIN, anti-mitochondrial, ASMA negative.  - Replete lytes as required. Monitor PO intake. Continue ondansetron, metoclopramide, continue PPN.  - To EGD this morning.  - Appreciate GI assistance.

## 2024-07-10 LAB
ALBUMIN SERPL BCP-MCNC: 1.3 G/DL (ref 3.5–5.2)
ALP SERPL-CCNC: 633 U/L (ref 55–135)
ALT SERPL W/O P-5'-P-CCNC: 41 U/L (ref 10–44)
ANION GAP SERPL CALC-SCNC: 5 MMOL/L (ref 8–16)
AST SERPL-CCNC: 73 U/L (ref 10–40)
BASOPHILS # BLD AUTO: 0.05 K/UL (ref 0–0.2)
BASOPHILS NFR BLD: 0.8 % (ref 0–1.9)
BILIRUB SERPL-MCNC: 2.4 MG/DL (ref 0.1–1)
BUN SERPL-MCNC: 9 MG/DL (ref 8–23)
CALCIUM SERPL-MCNC: 7.4 MG/DL (ref 8.7–10.5)
CHLORIDE SERPL-SCNC: 100 MMOL/L (ref 95–110)
CO2 SERPL-SCNC: 26 MMOL/L (ref 23–29)
CREAT SERPL-MCNC: 0.6 MG/DL (ref 0.5–1.4)
DIFFERENTIAL METHOD BLD: ABNORMAL
EOSINOPHIL # BLD AUTO: 0.1 K/UL (ref 0–0.5)
EOSINOPHIL NFR BLD: 1.1 % (ref 0–8)
ERYTHROCYTE [DISTWIDTH] IN BLOOD BY AUTOMATED COUNT: 16.2 % (ref 11.5–14.5)
EST. GFR  (NO RACE VARIABLE): >60 ML/MIN/1.73 M^2
GLUCOSE SERPL-MCNC: 183 MG/DL (ref 70–110)
HCT VFR BLD AUTO: 31.6 % (ref 40–54)
HGB BLD-MCNC: 10.4 G/DL (ref 14–18)
IMM GRANULOCYTES # BLD AUTO: 0.02 K/UL (ref 0–0.04)
IMM GRANULOCYTES NFR BLD AUTO: 0.3 % (ref 0–0.5)
LYMPHOCYTES # BLD AUTO: 1 K/UL (ref 1–4.8)
LYMPHOCYTES NFR BLD: 15.5 % (ref 18–48)
MAGNESIUM SERPL-MCNC: 1.8 MG/DL (ref 1.6–2.6)
MCH RBC QN AUTO: 30.1 PG (ref 27–31)
MCHC RBC AUTO-ENTMCNC: 32.9 G/DL (ref 32–36)
MCV RBC AUTO: 92 FL (ref 82–98)
MONOCYTES # BLD AUTO: 0.6 K/UL (ref 0.3–1)
MONOCYTES NFR BLD: 9.1 % (ref 4–15)
NEUTROPHILS # BLD AUTO: 4.7 K/UL (ref 1.8–7.7)
NEUTROPHILS NFR BLD: 73.2 % (ref 38–73)
NRBC BLD-RTO: 0 /100 WBC
PHOSPHATE SERPL-MCNC: 2.8 MG/DL (ref 2.7–4.5)
PLATELET # BLD AUTO: 141 K/UL (ref 150–450)
PMV BLD AUTO: 10.7 FL (ref 9.2–12.9)
POCT GLUCOSE: 196 MG/DL (ref 70–110)
POCT GLUCOSE: 203 MG/DL (ref 70–110)
POCT GLUCOSE: 211 MG/DL (ref 70–110)
POCT GLUCOSE: 244 MG/DL (ref 70–110)
POTASSIUM SERPL-SCNC: 4.8 MMOL/L (ref 3.5–5.1)
PROT SERPL-MCNC: 4.8 G/DL (ref 6–8.4)
RBC # BLD AUTO: 3.45 M/UL (ref 4.6–6.2)
SODIUM SERPL-SCNC: 131 MMOL/L (ref 136–145)
WBC # BLD AUTO: 6.46 K/UL (ref 3.9–12.7)

## 2024-07-10 PROCEDURE — 36415 COLL VENOUS BLD VENIPUNCTURE: CPT | Performed by: HOSPITALIST

## 2024-07-10 PROCEDURE — 84100 ASSAY OF PHOSPHORUS: CPT | Performed by: HOSPITALIST

## 2024-07-10 PROCEDURE — 25000003 PHARM REV CODE 250: Performed by: INTERNAL MEDICINE

## 2024-07-10 PROCEDURE — 97530 THERAPEUTIC ACTIVITIES: CPT | Mod: CQ

## 2024-07-10 PROCEDURE — 94761 N-INVAS EAR/PLS OXIMETRY MLT: CPT

## 2024-07-10 PROCEDURE — B4185 PARENTERAL SOL 10 GM LIPIDS: HCPCS | Performed by: HOSPITALIST

## 2024-07-10 PROCEDURE — 83735 ASSAY OF MAGNESIUM: CPT | Performed by: HOSPITALIST

## 2024-07-10 PROCEDURE — 63600175 PHARM REV CODE 636 W HCPCS: Performed by: INTERNAL MEDICINE

## 2024-07-10 PROCEDURE — 63700000 PHARM REV CODE 250 ALT 637 W/O HCPCS: Performed by: INTERNAL MEDICINE

## 2024-07-10 PROCEDURE — 25000003 PHARM REV CODE 250: Performed by: NURSE PRACTITIONER

## 2024-07-10 PROCEDURE — 25000003 PHARM REV CODE 250: Performed by: HOSPITALIST

## 2024-07-10 PROCEDURE — 11000001 HC ACUTE MED/SURG PRIVATE ROOM

## 2024-07-10 PROCEDURE — 85025 COMPLETE CBC W/AUTO DIFF WBC: CPT | Performed by: HOSPITALIST

## 2024-07-10 PROCEDURE — 25000003 PHARM REV CODE 250: Performed by: ANESTHESIOLOGY

## 2024-07-10 PROCEDURE — A4216 STERILE WATER/SALINE, 10 ML: HCPCS | Performed by: HOSPITALIST

## 2024-07-10 PROCEDURE — 80053 COMPREHEN METABOLIC PANEL: CPT | Performed by: HOSPITALIST

## 2024-07-10 PROCEDURE — 63600175 PHARM REV CODE 636 W HCPCS: Performed by: NURSE PRACTITIONER

## 2024-07-10 RX ADMIN — ONDANSETRON 4 MG: 2 INJECTION INTRAMUSCULAR; INTRAVENOUS at 05:07

## 2024-07-10 RX ADMIN — Medication 1 EACH: at 06:07

## 2024-07-10 RX ADMIN — SODIUM CHLORIDE, PRESERVATIVE FREE 10 ML: 5 INJECTION INTRAVENOUS at 05:07

## 2024-07-10 RX ADMIN — OXYCODONE AND ACETAMINOPHEN 1 TABLET: 10; 325 TABLET ORAL at 07:07

## 2024-07-10 RX ADMIN — FLUCONAZOLE 200 MG: 100 TABLET ORAL at 09:07

## 2024-07-10 RX ADMIN — Medication 1 EACH: at 09:07

## 2024-07-10 RX ADMIN — DIGOXIN 0.12 MG: 125 TABLET ORAL at 09:07

## 2024-07-10 RX ADMIN — I.V. FAT EMULSION 250 ML: 20 EMULSION INTRAVENOUS at 10:07

## 2024-07-10 RX ADMIN — ONDANSETRON 4 MG: 2 INJECTION INTRAMUSCULAR; INTRAVENOUS at 11:07

## 2024-07-10 RX ADMIN — OXYCODONE AND ACETAMINOPHEN 1 TABLET: 10; 325 TABLET ORAL at 05:07

## 2024-07-10 RX ADMIN — OXYCODONE AND ACETAMINOPHEN 1 TABLET: 10; 325 TABLET ORAL at 03:07

## 2024-07-10 RX ADMIN — OXYCODONE AND ACETAMINOPHEN 1 TABLET: 10; 325 TABLET ORAL at 09:07

## 2024-07-10 RX ADMIN — SODIUM CHLORIDE, PRESERVATIVE FREE 10 ML: 5 INJECTION INTRAVENOUS at 12:07

## 2024-07-10 RX ADMIN — PANTOPRAZOLE SODIUM 40 MG: 40 INJECTION, POWDER, LYOPHILIZED, FOR SOLUTION INTRAVENOUS at 09:07

## 2024-07-10 RX ADMIN — INSULIN ASPART 2 UNITS: 100 INJECTION, SOLUTION INTRAVENOUS; SUBCUTANEOUS at 12:07

## 2024-07-10 RX ADMIN — ASCORBIC ACID, VITAMIN A PALMITATE, CHOLECALCIFEROL, THIAMINE HYDROCHLORIDE, RIBOFLAVIN-5 PHOSPHATE SODIUM, PYRIDOXINE HYDROCHLORIDE, NIACINAMIDE, DEXPANTHENOL, ALPHA-TOCOPHEROL ACETATE, VITAMIN K1, FOLIC ACID, BIOTIN, CYANOCOBALAMIN: 200; 3300; 200; 6; 3.6; 6; 40; 15; 10; 150; 600; 60; 5 INJECTION, SOLUTION INTRAVENOUS at 10:07

## 2024-07-10 RX ADMIN — LAMIVUDINE 150 MG: 150 TABLET, FILM COATED ORAL at 08:07

## 2024-07-10 RX ADMIN — METOCLOPRAMIDE 10 MG: 10 TABLET ORAL at 06:07

## 2024-07-10 RX ADMIN — OXYCODONE HYDROCHLORIDE 5 MG: 5 TABLET ORAL at 12:07

## 2024-07-10 RX ADMIN — ESCITALOPRAM OXALATE 20 MG: 10 TABLET ORAL at 08:07

## 2024-07-10 RX ADMIN — INSULIN ASPART 1 UNITS: 100 INJECTION, SOLUTION INTRAVENOUS; SUBCUTANEOUS at 09:07

## 2024-07-10 RX ADMIN — INSULIN ASPART 2 UNITS: 100 INJECTION, SOLUTION INTRAVENOUS; SUBCUTANEOUS at 09:07

## 2024-07-10 RX ADMIN — METOCLOPRAMIDE 10 MG: 10 TABLET ORAL at 03:07

## 2024-07-10 NOTE — PLAN OF CARE
Care plan reviewed with patient. Pain managed by PRN med. Ambulated in the room with assist and a walker. Free from falls. No other complaints made. All orders checked and reviewed.

## 2024-07-10 NOTE — PT/OT/SLP PROGRESS
"Physical Therapy Treatment    Patient Name:  Nithin Wagner   MRN:  [id number]    Recommendations:     Discharge Recommendations: Low Intensity Therapy (TBD, will need inc assistance if going home)  Discharge Equipment Recommendations: none  Barriers to discharge: Decreased caregiver support    Assessment:     Nithin Wagner is a 68 y.o. male admitted with a medical diagnosis of Colitis.  He presents with the following impairments/functional limitations: weakness, impaired endurance, impaired self care skills, impaired functional mobility, gait instability, impaired balance, decreased upper extremity function, decreased lower extremity function, decreased safety awareness, pain, decreased ROM, impaired skin, edema, impaired cardiopulmonary response to activity ;pt with fair mobility today, only able to stand briefly at EOB and take a few steps in place, increased edema in LE's, pt not feeling well enough to amb. Today. .    Rehab Prognosis: Fair; patient would benefit from acute skilled PT services to address these deficits and reach maximum level of function.    Recent Surgery: Procedure(s) (LRB):  EGD (ESOPHAGOGASTRODUODENOSCOPY) (N/A) 2 Days Post-Op    Plan:     During this hospitalization, patient to be seen 5 x/week to address the identified rehab impairments via gait training, therapeutic activities, therapeutic exercises, neuromuscular re-education and progress toward the following goals:    Plan of Care Expires:  07/17/24    Subjective     Chief Complaint: LE pain, swelling.   Patient/Family Comments/goals: "I feel like I'm in heart failure". Pt agreeable to standing up and trying to take steps.   Pain/Comfort:  Pain Rating 1:  (did not rate)  Location - Side 1: Bilateral  Location 1: leg      Objective:     Communicated with nurse prior to session.  Patient found left sidelying with peripheral IV upon PT entry to room.     General Precautions: Standard, diabetic, fall  Orthopedic Precautions: N/A  Braces: " N/A  Respiratory Status: Room air     Functional Mobility:  Bed Mobility:     Supine to Sit: supervision  Transfers:     Sit to Stand:  contact guard assistance with rolling walker  Gait: pt took a few steps in place and side steps w/ RW and CGA      AM-PAC 6 CLICK MOBILITY  Turning over in bed (including adjusting bedclothes, sheets and blankets)?: 4  Sitting down on and standing up from a chair with arms (e.g., wheelchair, bedside commode, etc.): 3  Moving from lying on back to sitting on the side of the bed?: 4  Moving to and from a bed to a chair (including a wheelchair)?: 3  Need to walk in hospital room?: 3  Climbing 3-5 steps with a railing?: 2  Basic Mobility Total Score: 19       Treatment & Education:  Pt stood 2x's at EOB w/ RW and CGA/min.A  Perf'd seated LAQ's x 5 ea.       Patient left sitting edge of bed with all lines intact, call button in reach, nurse notified, and dinner tray present, .educated pt on elevating LE's soon after eating.     GOALS:   Multidisciplinary Problems       Physical Therapy Goals          Problem: Physical Therapy    Goal Priority Disciplines Outcome Goal Variances Interventions   Physical Therapy Goal     PT, PT/OT Progressing     Description: Goals to be met by: 24    Patient will increase functional independence with mobility by performin. Sit<>stand with supervision with RW.  2. Gait x 50 feet with supervision with RW.  3. Ascend/descend 4 step(s) with least restrictive assistive device and uni HR with Shani.   4. Tolerate sitting up in chair x30 min with minimal increase in back pain.                       Time Tracking:     PT Received On: 07/10/24  PT Start Time:      PT Stop Time: 180  PT Total Time (min): 16 min     Billable Minutes: Therapeutic Activity 16    Treatment Type: Treatment  PT/PTA: PTA     Number of PTA visits since last PT visit: 4     07/10/2024

## 2024-07-10 NOTE — PROGRESS NOTES
[facility] Medicine  Progress Note    Patient Name: Nithin Wagner  MRN: [id number]  Patient Class: IP- Inpatient   Admission Date: 6/28/2024  Length of Stay: 11 days  Attending Physician: Darby Obrien MD  Primary Care Provider: Tushar Drew MD        Subjective:     Principal Problem:Colitis        HPI:  Nithin Wagner is a 68 y.o. male with a past medical history of hepatitis-B, hypertension, diastolic heart failure, diabetes, rheumatoid arthritis, hyponatremia, s/p s/p T11 and L3 kyphoplasty on 4/22/2024 who presents to the ED with lower abdominal pain recurrent onset 4 days ago.  Patient reports associated nausea and vomiting and states he has been experiencing intermittent diarrhea over the past several months.  He denies blood in stool.  Patient  was seen here on i 06/26/2024 for emesis and weakness. Workup showed findings suggestive of colitis and dehydration. He was offered admission at that time but he stated he would prefer to go home. Stated he went home and was unable to hold down significant fluids and came back.  Reports subjective fever a few days ago.  Had a normal bowel movement this morning     ED workup significant for unremarkable CBC, mild hyponatremia 132, T bili 4.1, albumin 1.6,  and .  Lactic acid 1.2.  CT abdomen performed 06/26/2024 showed diffuse colitis.  Patient was started on IV Cipro and Flagyl in the ED and referred to Hospital Medicine.  Patient will be admitted for further evaluation and management.    Overview/Hospital Course:  Mr. Wagner presented with abdominal pain with nausea and vomiting.  Placed in observation status.  Treatment initiated for colitis with IV Cipro and Flagyl, IV fluids, antiemetics and supportive care.  GI consulted. Underwent paracentesis. LFTs gradually improved though appetite remained poor. Completed course of antibiotic therapy. Autoimmune workup for liver disease unremarkable. Scheduled antiemetics with  improvement in symptoms though not in appetite. Started PPN. EGD performed 07/08 demonstrating white plaques in esophagus and gastritis; started fluconazole.    Interval History: No acute events overnight. Mild pain; able to eat more, ate about 35% of his meals from yesterday today.  Feeling a little better but still weak.    Review of Systems   Constitutional:  Negative for chills and fever.   Respiratory:  Negative for cough and shortness of breath.    Cardiovascular:  Negative for chest pain and palpitations.   Gastrointestinal:  Positive for abdominal pain. Negative for nausea and vomiting.     Objective:     Vital Signs (Most Recent):  Temp: 98.5 °F (36.9 °C) (07/10/24 1525)  Pulse: 100 (07/10/24 1525)  Resp: 16 (07/10/24 1525)  BP: 121/76 (07/10/24 1525)  SpO2: 95 % (07/10/24 1525) Vital Signs (24h Range):  Temp:  [98.2 °F (36.8 °C)-98.9 °F (37.2 °C)] 98.5 °F (36.9 °C)  Pulse:  [] 100  Resp:  [15-18] 16  SpO2:  [92 %-95 %] 95 %  BP: (103-124)/(67-76) 121/76     Weight: 83.1 kg (183 lb 3.2 oz)  Body mass index is 24.85 kg/m².    Intake/Output Summary (Last 24 hours) at 7/10/2024 1545  Last data filed at 7/10/2024 1356  Gross per 24 hour   Intake --   Output 1405 ml   Net -1405 ml         Physical Exam  Vitals and nursing note reviewed.   Constitutional:       General: He is not in acute distress.     Appearance: He is well-developed.   HENT:      Head: Normocephalic and atraumatic.   Eyes:      General:         Right eye: No discharge.         Left eye: No discharge.      Conjunctiva/sclera: Conjunctivae normal.   Cardiovascular:      Rate and Rhythm: Normal rate.      Pulses: Normal pulses.   Pulmonary:      Effort: Pulmonary effort is normal. No respiratory distress.   Abdominal:      General: There is distension.      Palpations: Abdomen is soft.      Tenderness: There is abdominal tenderness.   Musculoskeletal:         General: Normal range of motion.      Right lower leg: Edema present.      Left  lower leg: Edema present.   Skin:     General: Skin is warm and dry.   Neurological:      Mental Status: He is alert and oriented to person, place, and time.           Significant Labs:   CBC:  Recent Labs   Lab 07/08/24  0449 07/09/24  0518 07/10/24  0621   WBC 5.78 6.49 6.46   HGB 12.1* 11.5* 10.4*   HCT 36.3* 34.5* 31.6*   * 144* 141*   GRAN 63.9  3.7 62.5  4.1 73.2*  4.7   LYMPH 21.3  1.2 22.5  1.5 15.5*  1.0   MONO 12.6  0.7 12.2  0.8 9.1  0.6   EOS 0.1 0.1 0.1   BASO 0.04 0.05 0.05   CMP:  Recent Labs   Lab 07/08/24  0449 07/09/24  0518 07/10/24  0621   * 131* 131*   K 4.4 4.7 4.8    101 100   CO2 25 25 26   BUN 7* 8 9   CREATININE 0.6 0.6 0.6   * 168* 183*   CALCIUM 7.2* 7.4* 7.4*   MG 1.8 1.9 1.8   PHOS 2.4* 2.8 2.8   ALKPHOS 483* 564* 633*   AST 61* 67* 73*   ALT 46* 41 41   BILITOT 2.6* 2.6* 2.4*   PROT 4.7* 4.7* 4.8*   ALBUMIN 1.3* 1.3* 1.3*   ANIONGAP 4* 5* 5*      Significant Imaging: I have reviewed and interpreted all pertinent imaging results/findings within the past 24 hours.    Assessment/Plan:      * Colitis  Abnormal LFTs  Hypokalemia  - Patient with intermittent nausea, vomiting and diarrhea.  Reports subjective fever.  CT abdomen with findings concerning for colitis.   - Completed 7 days of ciprofloxacin/metronidazole.  - Liver function tests with some improvement, cholestatic pattern initially.  - Patient with some clinical improvement in symptoms and stool studies negative for C.diff  - MRCP was negative for obstruction, noted gallbladder distention and large amount of ascites  - Case discussed with GI; paracentesis 07/02 demonstrating SAAG 1.1. NITIN, anti-mitochondrial, ASMA negative.  - Replete lytes as required. Monitor PO intake. Continue ondansetron, metoclopramide, continue PPN.  - EGD remarkable for Candida esophagitis and patient was started on fluconazole on 7/8  - Clinically improving with decreased pain and increased p.o. intake, continue PPN until  oral intake is adequate which is still not there yet, hopefully will be able to wean off in the next 1-2 days  - Appreciate GI assistance.    SVT (supraventricular tachycardia)  - History SVT noted.  Followed by Cardiology, Dr. Doshi, outpatient.  States he takes digoxin and has had no recent episodes of SVT  - Continue digoxin 0.125 mg p.o. daily  - Monitor on telemetry    Type 2 diabetes mellitus, with long-term current use of insulin  - Recent HgbA1c 7.4 performed on 3/2024.  Patient has not been taking insulin and was on tirzepeptide and stopped taking in April  - Glucose level reviewed, in normal to slightly elevated range  - Continue monitor with Accu-Cheks q.a.c. and q.h.s. and give low-dose sliding scale insulin as necessary    Chronic diastolic heart failure  - Recent Echo performed 04/03/2024 showed EF 55-60%.  Patient states he no longer takes Lasix.  Lower extremity edema noted, patient denies shortness of breath  - Clinically appears near euvolemic though with anasarca due to liver disease / hypoalbuminemia.    NAFLD (nonalcoholic fatty liver disease)  - History noted.  LFTs on admission elevated from baseline with , T bili 4.1,  and .  - As discussed above    Hepatitis B core antibody positive  - Patient states he was exposed to hep B while working in [place] in the 1990s and is on prophylaxis with lamivudine   - Continue lamivudine daily      VTE Risk Mitigation (From admission, onward)           Ordered     Place CHELE hose  Until discontinued         07/03/24 2056     IP VTE HIGH RISK PATIENT  Once         06/28/24 2107     Place sequential compression device  Until discontinued         06/28/24 2107                        Darby Obrien MD  Department of Hospital Medicine   [facility] - Med Surg ([place])

## 2024-07-10 NOTE — PLAN OF CARE
Problem: Adult Inpatient Plan of Care  Goal: Plan of Care Review  Outcome: Progressing  Goal: Patient-Specific Goal (Individualized)  Outcome: Progressing  Goal: Absence of Hospital-Acquired Illness or Injury  Outcome: Progressing  Goal: Optimal Comfort and Wellbeing  Outcome: Progressing  Goal: Readiness for Transition of Care  Outcome: Progressing     Problem: Diabetes Comorbidity  Goal: Blood Glucose Level Within Targeted Range  Outcome: Progressing     Problem: Infection  Goal: Absence of Infection Signs and Symptoms  Outcome: Progressing     Problem: Skin Injury Risk Increased  Goal: Skin Health and Integrity  Outcome: Progressing     Problem: Nausea and Vomiting  Goal: Nausea and Vomiting Relief  Outcome: Progressing

## 2024-07-10 NOTE — SUBJECTIVE & OBJECTIVE
Interval History: No acute events overnight. Mild pain; able to eat more, ate about 35% of his meals from yesterday today.  Feeling a little better but still weak.    Review of Systems   Constitutional:  Negative for chills and fever.   Respiratory:  Negative for cough and shortness of breath.    Cardiovascular:  Negative for chest pain and palpitations.   Gastrointestinal:  Positive for abdominal pain. Negative for nausea and vomiting.     Objective:     Vital Signs (Most Recent):  Temp: 98.5 °F (36.9 °C) (07/10/24 1525)  Pulse: 100 (07/10/24 1525)  Resp: 16 (07/10/24 1525)  BP: 121/76 (07/10/24 1525)  SpO2: 95 % (07/10/24 1525) Vital Signs (24h Range):  Temp:  [98.2 °F (36.8 °C)-98.9 °F (37.2 °C)] 98.5 °F (36.9 °C)  Pulse:  [] 100  Resp:  [15-18] 16  SpO2:  [92 %-95 %] 95 %  BP: (103-124)/(67-76) 121/76     Weight: 83.1 kg (183 lb 3.2 oz)  Body mass index is 24.85 kg/m².    Intake/Output Summary (Last 24 hours) at 7/10/2024 1545  Last data filed at 7/10/2024 1356  Gross per 24 hour   Intake --   Output 1405 ml   Net -1405 ml         Physical Exam  Vitals and nursing note reviewed.   Constitutional:       General: He is not in acute distress.     Appearance: He is well-developed.   HENT:      Head: Normocephalic and atraumatic.   Eyes:      General:         Right eye: No discharge.         Left eye: No discharge.      Conjunctiva/sclera: Conjunctivae normal.   Cardiovascular:      Rate and Rhythm: Normal rate.      Pulses: Normal pulses.   Pulmonary:      Effort: Pulmonary effort is normal. No respiratory distress.   Abdominal:      General: There is distension.      Palpations: Abdomen is soft.      Tenderness: There is abdominal tenderness.   Musculoskeletal:         General: Normal range of motion.      Right lower leg: Edema present.      Left lower leg: Edema present.   Skin:     General: Skin is warm and dry.   Neurological:      Mental Status: He is alert and oriented to person, place, and time.            Significant Labs:   CBC:  Recent Labs   Lab 07/08/24 0449 07/09/24  0518 07/10/24  0621   WBC 5.78 6.49 6.46   HGB 12.1* 11.5* 10.4*   HCT 36.3* 34.5* 31.6*   * 144* 141*   GRAN 63.9  3.7 62.5  4.1 73.2*  4.7   LYMPH 21.3  1.2 22.5  1.5 15.5*  1.0   MONO 12.6  0.7 12.2  0.8 9.1  0.6   EOS 0.1 0.1 0.1   BASO 0.04 0.05 0.05   CMP:  Recent Labs   Lab 07/08/24  0449 07/09/24  0518 07/10/24  0621   * 131* 131*   K 4.4 4.7 4.8    101 100   CO2 25 25 26   BUN 7* 8 9   CREATININE 0.6 0.6 0.6   * 168* 183*   CALCIUM 7.2* 7.4* 7.4*   MG 1.8 1.9 1.8   PHOS 2.4* 2.8 2.8   ALKPHOS 483* 564* 633*   AST 61* 67* 73*   ALT 46* 41 41   BILITOT 2.6* 2.6* 2.4*   PROT 4.7* 4.7* 4.8*   ALBUMIN 1.3* 1.3* 1.3*   ANIONGAP 4* 5* 5*      Significant Imaging: I have reviewed and interpreted all pertinent imaging results/findings within the past 24 hours.

## 2024-07-10 NOTE — PROGRESS NOTES
[facility] - Med Surg ([place])  Adult Nutrition  Progress Note    SUMMARY       Recommendations  1) If pt to remain on PPN: consider increasing Clinimix 4.25/5 to 100 mL/hr with IVFE to meet 65% EEN (25 kcal/kg/d) and 104% EPN (1.2 g/kg/d)     2) If TPN warranted: via central line, administer Clinimix E 5/15 @ 85 mL/hr with standard daily lipids - Provides 2040 mL total volume, 102 g protein, 306 g dextrose, 1948 kcal, GIR 2.6     3) Monitor PO intake and tolerance - encourage intake of meals + commercial beverages    4) RD to monitor and follow up    Goals:   1) Pt able to tolerate initiation and advancement of TPN by RD follow up (goal met)  2) Pt will have diet adv and able to tolerate 50-75% solid PO by RD follow up  Nutrition Goal Status: progressing towards goals  Communication of RD Recs:  (POC)    Assessment and Plan    Endocrine  Severe protein-calorie malnutrition  Malnutrition Type:  Context: acute illness or injury, chronic illness  Level: severe    Related to (etiology):   Altered GI function    Signs and Symptoms (as evidenced by):   Inability to to keep anything down d/t N/V/D  > 10% weight loss in 6 months  Pt states he hasn't been able to eat for 2 months     Malnutrition Characteristic Summary:  Weight Loss (Malnutrition): greater than 10% in 6 months  Energy Intake (Malnutrition): less than 75% for greater than or equal to 1 month (NPO/CLD since admit 6/28)  Muscle Mass (Malnutrition): severe depletion      Interventions (treatment strategy):  Modify rate of PPN  Fiber modified diet  Collaboration with other providers    Nutrition Diagnosis Status:   Continues         Malnutrition Assessment  Malnutrition Context: acute illness or injury, chronic illness  Malnutrition Level: severe  Skin (Micronutrient): bruised, turgor reduced (skin tears easily)       Weight Loss (Malnutrition): greater than 10% in 6 months  Energy Intake (Malnutrition): less than 75% for greater than or equal to 1 month (NPO/CLD  since admit 6/28)  Muscle Mass (Malnutrition): severe depletion   Orbital Region (Subcutaneous Fat Loss): well nourished  Upper Arm Region (Subcutaneous Fat Loss): severe depletion   Religious Region (Muscle Loss): mild depletion  Clavicle Bone Region (Muscle Loss): mild depletion  Clavicle and Acromion Bone Region (Muscle Loss): moderate depletion  Patellar Region (Muscle Loss): moderate depletion  Anterior Thigh Region (Muscle Loss): severe depletion  Posterior Calf Region (Muscle Loss): severe depletion                 Reason for Assessment    Reason For Assessment: RD follow-up  Diagnosis: gastrointestinal disease (colitis)  Relevant Medical History:   Patient Active Problem List   Diagnosis    Hepatitis B core antibody positive    NAFLD (nonalcoholic fatty liver disease)    Essential hypertension    Chronic diastolic heart failure    Type 2 diabetes mellitus, with long-term current use of insulin    Class 1 obesity due to excess calories with serious comorbidity and body mass index (BMI) of 34.0 to 34.9 in adult    Tobacco use disorder    Polycythemia, secondary    Canker sores oral    Coronary artery disease    Chronic use of opiate drug for therapeutic purpose    Rheumatoid arthritis flare    Fatigue    Acute on chronic diastolic heart failure    Supraventricular tachycardia    Hyponatremia    Atherosclerotic peripheral vascular disease    Folliculitis    Rheumatoid arthritis involving multiple sites with positive rheumatoid factor    Anxiety    Long term (current) use of systemic steroids    Vitamin D deficiency    Osteoporosis    Hyperlipidemia    Severe obesity    Type 2 diabetes mellitus with other circulatory complication, with long-term current use of insulin    Nuclear sclerotic cataract of right eye    Lumbar vertebral fracture    Intractable pain    Diarrhea of presumed infectious origin    Enteritis    Nephrolithiasis    Constipation    Depression    Testicular pain, left    Fatty (change of) liver,  not elsewhere classified    Colitis    SVT (supraventricular tachycardia)    Severe protein-calorie malnutrition    Abnormal LFTs (liver function tests)     Interdisciplinary Rounds: did not attend  General Information Comments: Pt diet adv to low fiber diet, pt reports able to tolerate a little bit more of his meals. Reported nausea but did not want to complain. No diarrhea, now with constipation. Clinimix-E 4.25/5 at 85 mL/hr. Onsly asked for extra ketchup with meals. PIV and midline. Felipe 18 - L arm, RLQ, R hand.   Nutrition Discharge Planning: pending medical course    Nutrition Related Social Determinants of Health: SDOH: Adequate food in home environment    Nutrition Risk Screen    Nutrition Risk Screen: unintentional loss of 10 lbs or more in the past 2 months, reduced oral intake over the last month    Nutrition/Diet History    Spiritual, Cultural Beliefs, Latter day Practices, Values that Affect Care: no  Factors Affecting Nutritional Intake: altered gastrointestinal function, decreased appetite, nausea/vomiting, constipation    Anthropometrics    Temp: 98.5 °F (36.9 °C)  Height Method: Stated  Height: 6' (182.9 cm)  Height (inches): 72 in  Weight Method: Bed Scale  Weight: 83.1 kg (183 lb 3.2 oz)  Weight (lb): 183.2 lb  Ideal Body Weight (IBW), Male: 178 lb  % Ideal Body Weight, Male (lb): 100.94 %  BMI (Calculated): 24.8  BMI Grade: 18.5-24.9 - normal  Weight Loss: unintentional  Usual Body Weight (UBW), k kg  % Usual Body Weight: 81.67  % Weight Change From Usual Weight: -18.5 %       Lab/Procedures/Meds    Pertinent Labs Reviewed: reviewed  CBC:  Recent Labs   Lab 07/10/24  0621   WBC 6.46   HGB 10.4*   HCT 31.6*   *     CMP:  Recent Labs   Lab 07/10/24  0621   CALCIUM 7.4*   ALBUMIN 1.3*   PROT 4.8*   *   K 4.8   CO2 26      BUN 9   CREATININE 0.6   ALKPHOS 633*   ALT 41   AST 73*   BILITOT 2.4*     BMP:   Recent Labs   Lab 07/10/24  0621   *   *   K 4.8       CO2 26   BUN 9   CREATININE 0.6   CALCIUM 7.4*   MG 1.8     Triglycerides   Date Value Ref Range Status   07/06/2024 218 (H) 30 - 150 mg/dL Final     Comment:     The National Cholesterol Education Program (NCEP) has set the  following guidelines (reference values) for triglycerides:  Normal......................<150 mg/dL  Borderline High.............150-199 mg/dL  High........................200-499 mg/dL           Pertinent Medications Reviewed: reviewed  Scheduled Meds:   digoxin  0.125 mg Oral Daily    EScitalopram oxalate  20 mg Oral QHS    fat emulsion 20%  250 mL Intravenous Daily    fat emulsion 20%  250 mL Intravenous Daily    fluconazole  200 mg Oral Daily    lactobacillus acidophilus & bulgar  1 packet Oral TID WM    lamiVUDine  150 mg Oral QHS    LIDOcaine  1 patch Transdermal Q24H    metoclopramide HCl  10 mg Oral TID AC    ondansetron  4 mg Intravenous Q6H    pantoprazole  40 mg Intravenous Daily    sodium chloride 0.9%  10 mL Intravenous Q6H     Continuous Infusions:   Amino acid 4.25% - dextrose 5% (CLINIMIX-E) solution with additives (1L provides 42.5 gm AA, 50 gm CHO (170 kcal/L dextrose), Na 35, K 30, Mg 5, Ca 4.5, Acetate 70, Cl 39, Phos 15)   Intravenous Continuous 85 mL/hr at 07/09/24 2226 New Bag at 07/09/24 2226    Amino acid 4.25% - dextrose 5% (CLINIMIX-E) solution with additives (1L provides 42.5 gm AA, 50 gm CHO (170 kcal/L dextrose), Na 35, K 30, Mg 5, Ca 4.5, Acetate 70, Cl 39, Phos 15)   Intravenous Continuous         PRN Meds:.  Current Facility-Administered Medications:     acetaminophen, 650 mg, Oral, Q8H PRN    dextromethorphan-guaiFENesin  mg/5 ml, 10 mL, Oral, Q4H PRN    dextrose 10%, 12.5 g, Intravenous, PRN    dextrose 10%, 25 g, Intravenous, PRN    glucagon (human recombinant), 1 mg, Intramuscular, PRN    glucagon (human recombinant), 1 mg, Intramuscular, PRN    glucose, 16 g, Oral, PRN    glucose, 24 g, Oral, PRN    HYDROmorphone, 0.4 mg, Intravenous, Q5 Min PRN     hydrOXYzine HCL, 50 mg, Oral, QID PRN    insulin aspart U-100, 0-5 Units, Subcutaneous, QID (AC + HS) PRN    loperamide, 1 mg, Oral, QID PRN    melatonin, 6 mg, Oral, Nightly PRN    naloxone, 0.02 mg, Intravenous, PRN    ondansetron, 4 mg, Intravenous, Daily PRN    oxyCODONE, 5 mg, Oral, Q3H PRN    oxyCODONE-acetaminophen, 1 tablet, Oral, Q4H PRN    prochlorperazine, 5 mg, Intravenous, Q6H PRN    Flushing PICC/Midline Protocol, , , Until Discontinued **AND** sodium chloride 0.9%, 10 mL, Intravenous, Q6H **AND** sodium chloride 0.9%, 10 mL, Intravenous, PRN    sodium chloride 0.9%, 3 mL, Intravenous, PRN    Estimated/Assessed Needs    Weight Used For Calorie Calculations: 81.5 kg (179 lb 10.8 oz)  Energy Calorie Requirements (kcal): 7919-5087  Energy Need Method: Kcal/kg (25-30)  Protein Requirements:  g (1.2-1.5 g/kg during colitis flare)  Weight Used For Protein Calculations: 81.5 kg (179 lb 10.8 oz)  Fluid Requirements (mL): 1 mL/kcal  Estimated Fluid Requirement Method:  (or per MD)  RDA Method (mL): 2037  CHO Requirement: 250 g (50% EEN)      Nutrition Prescription Ordered    Current Diet Order: Low fiber  Current Nutrition Support Formula Ordered: Clinimix 4.25/5  Current Nutrition Support Rate Ordered: 85 (ml)  Current Nutrition Support Frequency Ordered: mL/hr  Oral Nutrition Supplement: Commercial beverages TID    Evaluation of Received Nutrient/Fluid Intake    Parenteral Calories (kcal): 694  Parenteral Protein (gm): 87  Parenteral Fluid (mL): 2040  Lipid Calories (kcals): 500 kcals  GIR (Glucose Infusion Rate) (mg/kg/min): 0.85 mg/kg/min  Oral Calories (kcal): 266  Oral Protein (gm): 15  % Kcal Needs: 59% (PPN)  % Protein Needs: 89% (PPN)  I/O: + 6.4 L since admit  Energy Calories Required: not meeting needs  Protein Required: not meeting needs  Fluid Required: not meeting needs  Comments: LBM: 7/5/2024  Tolerance: not tolerating  % Intake of Estimated Energy Needs: 50 - 75 %  % Meal Intake: 25 - 50  %    Nutrition Risk    Level of Risk/Frequency of Follow-up: moderate - high (follow up: 2-3 x per week)     Monitor and Evaluation    Food and Nutrient Intake: energy intake, food and beverage intake, parenteral nutrition intake  Food and Nutrient Adminstration: diet order, enteral and parenteral nutrition administration  Knowledge/Beliefs/Attitudes: food and nutrition knowledge/skill, beliefs and attitudes  Physical Activity and Function: nutrition-related ADLs and IADLs  Anthropometric Measurements: weight, weight change  Biochemical Data, Medical Tests and Procedures: electrolyte and renal panel, gastrointestinal profile, glucose/endocrine profile, inflammatory profile, lipid profile     Nutrition Follow-Up    RD Follow-up?: Yes    Radha Quintero RDN, FARHANAN

## 2024-07-10 NOTE — PLAN OF CARE
Recommendations  1) If pt to remain on PPN: consider increasing Clinimix 4.25/5 to 100 mL/hr with IVFE to meet 65% EEN (25 kcal/kg/d) and 104% EPN (1.2 g/kg/d)     2) If TPN warranted: via central line, administer Clinimix E 5/15 @ 85 mL/hr with standard daily lipids - Provides 2040 mL total volume, 102 g protein, 306 g dextrose, 1948 kcal, GIR 2.6     3) Monitor PO intake and tolerance - encourage intake of meals + commercial beverages    4) RD to monitor and follow up    Goals:   1) Pt able to tolerate initiation and advancement of TPN by RD follow up (goal met)  2) Pt will have diet adv and able to tolerate 50-75% solid PO by RD follow up  Nutrition Goal Status: progressing towards goals  Communication of RD Recs:  (POC)

## 2024-07-10 NOTE — PT/OT/SLP PROGRESS
Occupational Therapy      Patient Name:  Nithin Wagner   MRN:  [id number]    Patient not seen today secondary to Patient fatigue, Pain. Pt declining therapy citing having just returned to bed following sitting up in the chair for a couple hours and ust having pain meds. Agreeable to attempt later in PM. RN notified. Will follow-up as schedule allows.    7/10/2024

## 2024-07-10 NOTE — ASSESSMENT & PLAN NOTE
Abnormal LFTs  Hypokalemia  - Patient with intermittent nausea, vomiting and diarrhea.  Reports subjective fever.  CT abdomen with findings concerning for colitis.   - Completed 7 days of ciprofloxacin/metronidazole.  - Liver function tests with some improvement, cholestatic pattern initially.  - Patient with some clinical improvement in symptoms and stool studies negative for C.diff  - MRCP was negative for obstruction, noted gallbladder distention and large amount of ascites  - Case discussed with GI; paracentesis 07/02 demonstrating SAAG 1.1. NITIN, anti-mitochondrial, ASMA negative.  - Replete lytes as required. Monitor PO intake. Continue ondansetron, metoclopramide, continue PPN.  - EGD remarkable for Candida esophagitis and patient was started on fluconazole on 7/8  - Clinically improving with decreased pain and increased p.o. intake, continue PPN until oral intake is adequate which is still not there yet, hopefully will be able to wean off in the next 1-2 days  - Appreciate GI assistance.

## 2024-07-11 LAB
ALBUMIN SERPL BCP-MCNC: 1.4 G/DL (ref 3.5–5.2)
ALP SERPL-CCNC: 709 U/L (ref 55–135)
ALT SERPL W/O P-5'-P-CCNC: 48 U/L (ref 10–44)
ANION GAP SERPL CALC-SCNC: 6 MMOL/L (ref 8–16)
AST SERPL-CCNC: 85 U/L (ref 10–40)
BASOPHILS # BLD AUTO: 0.06 K/UL (ref 0–0.2)
BASOPHILS NFR BLD: 0.8 % (ref 0–1.9)
BILIRUB SERPL-MCNC: 2.3 MG/DL (ref 0.1–1)
BUN SERPL-MCNC: 11 MG/DL (ref 8–23)
CALCIUM SERPL-MCNC: 7.7 MG/DL (ref 8.7–10.5)
CHLORIDE SERPL-SCNC: 99 MMOL/L (ref 95–110)
CO2 SERPL-SCNC: 25 MMOL/L (ref 23–29)
CREAT SERPL-MCNC: 0.6 MG/DL (ref 0.5–1.4)
DIFFERENTIAL METHOD BLD: ABNORMAL
EOSINOPHIL # BLD AUTO: 0.1 K/UL (ref 0–0.5)
EOSINOPHIL NFR BLD: 1.8 % (ref 0–8)
ERYTHROCYTE [DISTWIDTH] IN BLOOD BY AUTOMATED COUNT: 16.3 % (ref 11.5–14.5)
EST. GFR  (NO RACE VARIABLE): >60 ML/MIN/1.73 M^2
GLUCOSE SERPL-MCNC: 165 MG/DL (ref 70–110)
HCT VFR BLD AUTO: 33.6 % (ref 40–54)
HGB BLD-MCNC: 10.9 G/DL (ref 14–18)
IMM GRANULOCYTES # BLD AUTO: 0.05 K/UL (ref 0–0.04)
IMM GRANULOCYTES NFR BLD AUTO: 0.7 % (ref 0–0.5)
LYMPHOCYTES # BLD AUTO: 1.2 K/UL (ref 1–4.8)
LYMPHOCYTES NFR BLD: 16.8 % (ref 18–48)
MAGNESIUM SERPL-MCNC: 1.8 MG/DL (ref 1.6–2.6)
MCH RBC QN AUTO: 30.4 PG (ref 27–31)
MCHC RBC AUTO-ENTMCNC: 32.4 G/DL (ref 32–36)
MCV RBC AUTO: 94 FL (ref 82–98)
MONOCYTES # BLD AUTO: 0.8 K/UL (ref 0.3–1)
MONOCYTES NFR BLD: 11 % (ref 4–15)
NEUTROPHILS # BLD AUTO: 4.9 K/UL (ref 1.8–7.7)
NEUTROPHILS NFR BLD: 68.9 % (ref 38–73)
NRBC BLD-RTO: 0 /100 WBC
PHOSPHATE SERPL-MCNC: 3.5 MG/DL (ref 2.7–4.5)
PLATELET # BLD AUTO: 175 K/UL (ref 150–450)
PMV BLD AUTO: 11.8 FL (ref 9.2–12.9)
POCT GLUCOSE: 200 MG/DL (ref 70–110)
POCT GLUCOSE: 223 MG/DL (ref 70–110)
POCT GLUCOSE: 232 MG/DL (ref 70–110)
POCT GLUCOSE: 246 MG/DL (ref 70–110)
POTASSIUM SERPL-SCNC: 4.8 MMOL/L (ref 3.5–5.1)
PROT SERPL-MCNC: 5.5 G/DL (ref 6–8.4)
RBC # BLD AUTO: 3.59 M/UL (ref 4.6–6.2)
SODIUM SERPL-SCNC: 130 MMOL/L (ref 136–145)
WBC # BLD AUTO: 7.09 K/UL (ref 3.9–12.7)

## 2024-07-11 PROCEDURE — A4216 STERILE WATER/SALINE, 10 ML: HCPCS | Performed by: HOSPITALIST

## 2024-07-11 PROCEDURE — 25000003 PHARM REV CODE 250: Performed by: NURSE PRACTITIONER

## 2024-07-11 PROCEDURE — 63600175 PHARM REV CODE 636 W HCPCS: Performed by: ANESTHESIOLOGY

## 2024-07-11 PROCEDURE — 83735 ASSAY OF MAGNESIUM: CPT | Performed by: HOSPITALIST

## 2024-07-11 PROCEDURE — 11000001 HC ACUTE MED/SURG PRIVATE ROOM

## 2024-07-11 PROCEDURE — 94761 N-INVAS EAR/PLS OXIMETRY MLT: CPT

## 2024-07-11 PROCEDURE — 25000003 PHARM REV CODE 250: Performed by: HOSPITALIST

## 2024-07-11 PROCEDURE — 36415 COLL VENOUS BLD VENIPUNCTURE: CPT | Performed by: HOSPITALIST

## 2024-07-11 PROCEDURE — 63700000 PHARM REV CODE 250 ALT 637 W/O HCPCS: Performed by: INTERNAL MEDICINE

## 2024-07-11 PROCEDURE — 25000003 PHARM REV CODE 250: Performed by: INTERNAL MEDICINE

## 2024-07-11 PROCEDURE — 63600175 PHARM REV CODE 636 W HCPCS: Performed by: INTERNAL MEDICINE

## 2024-07-11 PROCEDURE — 51798 US URINE CAPACITY MEASURE: CPT

## 2024-07-11 PROCEDURE — 85025 COMPLETE CBC W/AUTO DIFF WBC: CPT | Performed by: HOSPITALIST

## 2024-07-11 PROCEDURE — 97116 GAIT TRAINING THERAPY: CPT | Mod: CQ

## 2024-07-11 PROCEDURE — 80053 COMPREHEN METABOLIC PANEL: CPT | Performed by: HOSPITALIST

## 2024-07-11 PROCEDURE — 84100 ASSAY OF PHOSPHORUS: CPT | Performed by: HOSPITALIST

## 2024-07-11 PROCEDURE — 97530 THERAPEUTIC ACTIVITIES: CPT | Mod: CQ

## 2024-07-11 RX ADMIN — Medication 1 EACH: at 12:07

## 2024-07-11 RX ADMIN — SODIUM CHLORIDE, PRESERVATIVE FREE 10 ML: 5 INJECTION INTRAVENOUS at 12:07

## 2024-07-11 RX ADMIN — INSULIN ASPART 1 UNITS: 100 INJECTION, SOLUTION INTRAVENOUS; SUBCUTANEOUS at 11:07

## 2024-07-11 RX ADMIN — HYDROMORPHONE HYDROCHLORIDE 0.4 MG: 2 INJECTION INTRAMUSCULAR; INTRAVENOUS; SUBCUTANEOUS at 11:07

## 2024-07-11 RX ADMIN — Medication 1 EACH: at 04:07

## 2024-07-11 RX ADMIN — OXYCODONE AND ACETAMINOPHEN 1 TABLET: 10; 325 TABLET ORAL at 08:07

## 2024-07-11 RX ADMIN — METOCLOPRAMIDE 10 MG: 10 TABLET ORAL at 06:07

## 2024-07-11 RX ADMIN — PANTOPRAZOLE SODIUM 40 MG: 40 INJECTION, POWDER, LYOPHILIZED, FOR SOLUTION INTRAVENOUS at 09:07

## 2024-07-11 RX ADMIN — METOCLOPRAMIDE 10 MG: 10 TABLET ORAL at 12:07

## 2024-07-11 RX ADMIN — INSULIN ASPART 2 UNITS: 100 INJECTION, SOLUTION INTRAVENOUS; SUBCUTANEOUS at 04:07

## 2024-07-11 RX ADMIN — Medication 1 EACH: at 08:07

## 2024-07-11 RX ADMIN — LAMIVUDINE 150 MG: 150 TABLET, FILM COATED ORAL at 08:07

## 2024-07-11 RX ADMIN — INSULIN ASPART 2 UNITS: 100 INJECTION, SOLUTION INTRAVENOUS; SUBCUTANEOUS at 12:07

## 2024-07-11 RX ADMIN — ONDANSETRON 4 MG: 2 INJECTION INTRAMUSCULAR; INTRAVENOUS at 05:07

## 2024-07-11 RX ADMIN — SODIUM CHLORIDE, PRESERVATIVE FREE 10 ML: 5 INJECTION INTRAVENOUS at 11:07

## 2024-07-11 RX ADMIN — OXYCODONE AND ACETAMINOPHEN 1 TABLET: 10; 325 TABLET ORAL at 06:07

## 2024-07-11 RX ADMIN — ESCITALOPRAM OXALATE 20 MG: 10 TABLET ORAL at 08:07

## 2024-07-11 RX ADMIN — ONDANSETRON 4 MG: 2 INJECTION INTRAMUSCULAR; INTRAVENOUS at 11:07

## 2024-07-11 RX ADMIN — ONDANSETRON 4 MG: 2 INJECTION INTRAMUSCULAR; INTRAVENOUS at 12:07

## 2024-07-11 RX ADMIN — DIGOXIN 0.12 MG: 125 TABLET ORAL at 09:07

## 2024-07-11 RX ADMIN — OXYCODONE AND ACETAMINOPHEN 1 TABLET: 10; 325 TABLET ORAL at 02:07

## 2024-07-11 RX ADMIN — ASCORBIC ACID, VITAMIN A PALMITATE, CHOLECALCIFEROL, THIAMINE HYDROCHLORIDE, RIBOFLAVIN-5 PHOSPHATE SODIUM, PYRIDOXINE HYDROCHLORIDE, NIACINAMIDE, DEXPANTHENOL, ALPHA-TOCOPHEROL ACETATE, VITAMIN K1, FOLIC ACID, BIOTIN, CYANOCOBALAMIN: 200; 3300; 200; 6; 3.6; 6; 40; 15; 10; 150; 600; 60; 5 INJECTION, SOLUTION INTRAVENOUS at 09:07

## 2024-07-11 RX ADMIN — METOCLOPRAMIDE 10 MG: 10 TABLET ORAL at 04:07

## 2024-07-11 RX ADMIN — FLUCONAZOLE 200 MG: 100 TABLET ORAL at 09:07

## 2024-07-11 RX ADMIN — SODIUM CHLORIDE, PRESERVATIVE FREE 10 ML: 5 INJECTION INTRAVENOUS at 06:07

## 2024-07-11 RX ADMIN — OXYCODONE AND ACETAMINOPHEN 1 TABLET: 10; 325 TABLET ORAL at 10:07

## 2024-07-11 RX ADMIN — SODIUM CHLORIDE, PRESERVATIVE FREE 10 ML: 5 INJECTION INTRAVENOUS at 05:07

## 2024-07-11 NOTE — PT/OT/SLP PROGRESS
Physical Therapy Treatment    Patient Name:  Nithin Wagner   MRN:  [id number]    Recommendations:     Discharge Recommendations: Low Intensity Therapy (will need inc assistance at home, pt to be speaking with his nephew about it)  Discharge Equipment Recommendations: none  Barriers to discharge: Decreased caregiver support    Assessment:     Nithin Wagner is a 68 y.o. male admitted with a medical diagnosis of Colitis.  He presents with the following impairments/functional limitations: weakness, impaired endurance, impaired self care skills, impaired functional mobility, gait instability, impaired balance, decreased coordination, decreased upper extremity function, decreased lower extremity function, decreased safety awareness, pain, decreased ROM, impaired skin, edema, impaired cardiopulmonary response to activity ;pt with improved mobility today, able to amb short distance in room w/ RW and CGA/SBA, /70 standing, HR: 111. Though unable to tolerate sitting up in chair or EOB more than a few min.    Rehab Prognosis: Good and Fair; patient would benefit from acute skilled PT services to address these deficits and reach maximum level of function.    Recent Surgery: Procedure(s) (LRB):  EGD (ESOPHAGOGASTRODUODENOSCOPY) (N/A) 3 Days Post-Op    Plan:     During this hospitalization, patient to be seen 5 x/week to address the identified rehab impairments via therapeutic activities, therapeutic exercises, gait training, neuromuscular re-education and progress toward the following goals:    Plan of Care Expires:  07/17/24    Subjective     Chief Complaint: weakness, pain  Patient/Family Comments/goals: pt agreeable to session, worried about going home, unsure if he will have enough assistance from nephew.   Pain/Comfort:  Pain Rating 1: 6/10  Location - Orientation 1: generalized  Location 1: back  Pain Addressed 1: Pre-medicate for activity, Reposition, Distraction, Nurse notified  Pain Rating Post-Intervention 1:  "6/10      Objective:     Communicated with nurse prior to session.  Patient found supine with peripheral IV upon PT entry to room.     General Precautions: Standard, diabetic, fall  Orthopedic Precautions: N/A  Braces: N/A  Respiratory Status: Room air     Functional Mobility:  Bed Mobility:     Supine to Sit: stand by assistance  Sit to Supine: stand by assistance  Transfers:     Sit to Stand:  contact guard assistance with rolling walker  Gait: amb'd 30' w/ RW and CGA/SBA, cueing for safety and occ help w/ RW for turns.       AM-PAC 6 CLICK MOBILITY  Turning over in bed (including adjusting bedclothes, sheets and blankets)?: 4  Sitting down on and standing up from a chair with arms (e.g., wheelchair, bedside commode, etc.): 3  Moving from lying on back to sitting on the side of the bed?: 4  Moving to and from a bed to a chair (including a wheelchair)?: 3  Need to walk in hospital room?: 3  Climbing 3-5 steps with a railing?: 2  Basic Mobility Total Score: 19       Treatment & Education:  Pt wearing compression "tube" type stockings on lower legs today.   Attempted sitting up in recliner chair , though pt unable to sit more than 3 min., reporting he's uncomfortable and needing to get back in bed.   Spoke w/ pt at length about needing to sit up more and stay out of bed, with LE's elevated.     Patient left left sidelying with all lines intact and call button in reach..    GOALS:   Multidisciplinary Problems       Physical Therapy Goals          Problem: Physical Therapy    Goal Priority Disciplines Outcome Goal Variances Interventions   Physical Therapy Goal     PT, PT/OT Progressing     Description: Goals to be met by: 24    Patient will increase functional independence with mobility by performin. Sit<>stand with supervision with RW.  2. Gait x 50 feet with supervision with RW.  3. Ascend/descend 4 step(s) with least restrictive assistive device and uni HR with Shani.   4. Tolerate sitting up in chair " x30 min with minimal increase in back pain.                       Time Tracking:     PT Received On: 07/11/24  PT Start Time: 1214     PT Stop Time: 1239  PT Total Time (min): 25 min     Billable Minutes: Gait Training 15 and Therapeutic Activity 10    Treatment Type: Treatment  PT/PTA: PTA     Number of PTA visits since last PT visit: 5 07/11/2024

## 2024-07-11 NOTE — PLAN OF CARE
Problem: Adult Inpatient Plan of Care  Goal: Plan of Care Review  Outcome: Progressing  Goal: Patient-Specific Goal (Individualized)  Outcome: Progressing  Goal: Absence of Hospital-Acquired Illness or Injury  Outcome: Progressing  Goal: Optimal Comfort and Wellbeing  Outcome: Progressing  Goal: Readiness for Transition of Care  Outcome: Progressing     Problem: Diabetes Comorbidity  Goal: Blood Glucose Level Within Targeted Range  Outcome: Progressing     Problem: Wound  Goal: Optimal Coping  Outcome: Progressing  Goal: Optimal Functional Ability  Outcome: Progressing  Goal: Absence of Infection Signs and Symptoms  Outcome: Progressing  Goal: Improved Oral Intake  Outcome: Progressing  Goal: Optimal Pain Control and Function  Outcome: Progressing  Goal: Skin Health and Integrity  Outcome: Progressing  Goal: Optimal Wound Healing  Outcome: Progressing     Problem: Infection  Goal: Absence of Infection Signs and Symptoms  Outcome: Progressing     Problem: Skin Injury Risk Increased  Goal: Skin Health and Integrity  Outcome: Progressing     Problem: Nausea and Vomiting  Goal: Nausea and Vomiting Relief  Outcome: Progressing     Problem: Constipation  Goal: Effective Bowel Elimination  Outcome: Progressing     Problem: Pain Acute  Goal: Optimal Pain Control and Function  Outcome: Progressing

## 2024-07-11 NOTE — PLAN OF CARE
Medicare Message     Important Message from Medicare regarding Discharge Appeal Rights -- Explained to patient/caregiver; Other (comments)Important Message from Medicare regarding Discharge Appeal Rights. Explained to patient/caregiver; Other (comments). The comment is Verbal Consent. Taken on 7/5/24 1344 Explained to patient/caregiver; Other (comments)Important Message from Medicare regarding Discharge Appeal Rights. Explained to patient/caregiver; Other (comments). The comment is Verbal Consent. Taken on 7/11/24 1209   Date IMM was signed -- 7/5/2024 7/11/2024   Time IMM was signed -- [phone]

## 2024-07-11 NOTE — NURSING
Dressing on the left arm observed to be saturated with old blood, offered to change dressing pt declined stated w/c will do it tomorrow.

## 2024-07-11 NOTE — PROGRESS NOTES
[facility], [place] - Med Surg ([place])  Wound Care    Patient Name:  Nithin Wagner   MRN:  [id number]  Date: 7/11/2024  Diagnosis: Colitis    History:     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    forehead    Stroke     TIA       Social History     Socioeconomic History    Marital status: Single   Occupational History    Occupation: , respiratory therapist, founded [employer]     Comment: Retired   Tobacco Use    Smoking status: Every Day     Current packs/day: 1.00     Average packs/day: 1 pack/day for 35.0 years (35.0 ttl pk-yrs)     Types: Cigarettes    Smokeless tobacco: Never   Substance and Sexual Activity    Alcohol use: Not Currently    Drug use: No    Sexual activity: Never     Social Determinants of Health     Financial Resource Strain: Low Risk  (6/29/2024)    Overall Financial Resource Strain (CARDIA)     Difficulty of Paying Living Expenses: Not very hard   Food Insecurity: No Food Insecurity (6/29/2024)    Hunger Vital Sign     Worried About Running Out of Food in the Last Year: Never true     Ran Out of Food in the Last Year: Never true   Transportation Needs: No Transportation Needs (6/29/2024)    TRANSPORTATION NEEDS     Transportation : No   Physical Activity: Inactive (6/29/2024)    Exercise Vital Sign     Days of Exercise per Week: 0 days     Minutes of Exercise per Session: 0 min   Stress: No Stress Concern Present (6/29/2024)    [place] [place] [employer] - Occupational Stress Questionnaire     Feeling of Stress : Only a little   Recent Concern: Stress - Stress Concern Present  (4/30/2024)    [place] [place] [employer] - Occupational Stress Questionnaire     Feeling of Stress : To some extent   Housing Stability: Low Risk  (6/29/2024)    Housing Stability Vital Sign     Unable to Pay for Housing in the Last Year: No     Homeless in the Last Year: No       Precautions:     Allergies as of 06/28/2024 - Reviewed 06/28/2024   Allergen Reaction Noted    Enbrel [etanercept] Shortness Of Breath 07/12/2021    Nsaids (non-steroidal anti-inflammatory drug) Other (See Comments) 06/29/2015    Statins-hmg-coa reductase inhibitors Other (See Comments) 06/29/2015    Pcn [penicillins] Rash 06/29/2015       WOC Assessment Details/Treatment     Wound care follow up:     Met with patient for reassessment of skin tear to left forearm. Skin tear improving. Completed wound care as ordered. Also placed tubi  size F to bilateral lower leg edema. Patient could have gone down to smaller size but asked that we keep size F for today. Nursing notified. Wound care will follow.        07/11/24 1211        Wound 06/28/24 2145 Skin Tear Left lower;dorsal Arm   Date First Assessed/Time First Assessed: 06/28/24 2145   Present on Original Admission: Yes  Primary Wound Type: Skin Tear  Side: Left  Orientation: lower;dorsal  Location: Arm   Wound Image    Dressing Appearance Dried drainage   Drainage Amount Moderate   Drainage Characteristics/Odor Serosanguineous;No odor   Appearance Pink;Red;Moist   Tissue loss description Full thickness   Red (%), Wound Tissue Color 100 %   Periwound Area Ecchymotic   Care Cleansed with:;Antimicrobial agent   Dressing Applied;Silicone;Foam   Dressing Change Due 07/15/24       07/11/2024

## 2024-07-12 LAB
ALBUMIN SERPL BCP-MCNC: 1.4 G/DL (ref 3.5–5.2)
ALP SERPL-CCNC: 792 U/L (ref 55–135)
ALT SERPL W/O P-5'-P-CCNC: 50 U/L (ref 10–44)
ANION GAP SERPL CALC-SCNC: 6 MMOL/L (ref 8–16)
AST SERPL-CCNC: 91 U/L (ref 10–40)
BASOPHILS # BLD AUTO: 0.07 K/UL (ref 0–0.2)
BASOPHILS NFR BLD: 1.3 % (ref 0–1.9)
BILIRUB SERPL-MCNC: 2.1 MG/DL (ref 0.1–1)
BUN SERPL-MCNC: 11 MG/DL (ref 8–23)
CALCIUM SERPL-MCNC: 7.6 MG/DL (ref 8.7–10.5)
CHLORIDE SERPL-SCNC: 100 MMOL/L (ref 95–110)
CO2 SERPL-SCNC: 23 MMOL/L (ref 23–29)
CREAT SERPL-MCNC: 0.6 MG/DL (ref 0.5–1.4)
DIFFERENTIAL METHOD BLD: ABNORMAL
EOSINOPHIL # BLD AUTO: 0.2 K/UL (ref 0–0.5)
EOSINOPHIL NFR BLD: 3.1 % (ref 0–8)
ERYTHROCYTE [DISTWIDTH] IN BLOOD BY AUTOMATED COUNT: 16.6 % (ref 11.5–14.5)
EST. GFR  (NO RACE VARIABLE): >60 ML/MIN/1.73 M^2
FINAL PATHOLOGIC DIAGNOSIS: NORMAL
GLUCOSE SERPL-MCNC: 184 MG/DL (ref 70–110)
GROSS: NORMAL
HCT VFR BLD AUTO: 30.9 % (ref 40–54)
HGB BLD-MCNC: 10.1 G/DL (ref 14–18)
IMM GRANULOCYTES # BLD AUTO: 0.02 K/UL (ref 0–0.04)
IMM GRANULOCYTES NFR BLD AUTO: 0.4 % (ref 0–0.5)
LYMPHOCYTES # BLD AUTO: 1.2 K/UL (ref 1–4.8)
LYMPHOCYTES NFR BLD: 21.4 % (ref 18–48)
Lab: NORMAL
MAGNESIUM SERPL-MCNC: 1.8 MG/DL (ref 1.6–2.6)
MCH RBC QN AUTO: 31.2 PG (ref 27–31)
MCHC RBC AUTO-ENTMCNC: 32.7 G/DL (ref 32–36)
MCV RBC AUTO: 95 FL (ref 82–98)
MONOCYTES # BLD AUTO: 0.7 K/UL (ref 0.3–1)
MONOCYTES NFR BLD: 12.2 % (ref 4–15)
NEUTROPHILS # BLD AUTO: 3.4 K/UL (ref 1.8–7.7)
NEUTROPHILS NFR BLD: 61.6 % (ref 38–73)
NRBC BLD-RTO: 0 /100 WBC
PHOSPHATE SERPL-MCNC: 3.6 MG/DL (ref 2.7–4.5)
PLATELET # BLD AUTO: 176 K/UL (ref 150–450)
PMV BLD AUTO: 11.7 FL (ref 9.2–12.9)
POCT GLUCOSE: 150 MG/DL (ref 70–110)
POCT GLUCOSE: 217 MG/DL (ref 70–110)
POCT GLUCOSE: 230 MG/DL (ref 70–110)
POTASSIUM SERPL-SCNC: 4.7 MMOL/L (ref 3.5–5.1)
PROT SERPL-MCNC: 5.3 G/DL (ref 6–8.4)
RBC # BLD AUTO: 3.24 M/UL (ref 4.6–6.2)
SODIUM SERPL-SCNC: 129 MMOL/L (ref 136–145)
WBC # BLD AUTO: 5.56 K/UL (ref 3.9–12.7)

## 2024-07-12 PROCEDURE — 11000001 HC ACUTE MED/SURG PRIVATE ROOM

## 2024-07-12 PROCEDURE — 63600175 PHARM REV CODE 636 W HCPCS: Performed by: INTERNAL MEDICINE

## 2024-07-12 PROCEDURE — 85025 COMPLETE CBC W/AUTO DIFF WBC: CPT | Performed by: HOSPITALIST

## 2024-07-12 PROCEDURE — 99233 SBSQ HOSP IP/OBS HIGH 50: CPT | Mod: ,,, | Performed by: INTERNAL MEDICINE

## 2024-07-12 PROCEDURE — 80053 COMPREHEN METABOLIC PANEL: CPT | Performed by: HOSPITALIST

## 2024-07-12 PROCEDURE — 63600175 PHARM REV CODE 636 W HCPCS: Performed by: ANESTHESIOLOGY

## 2024-07-12 PROCEDURE — 84100 ASSAY OF PHOSPHORUS: CPT | Performed by: HOSPITALIST

## 2024-07-12 PROCEDURE — 94761 N-INVAS EAR/PLS OXIMETRY MLT: CPT

## 2024-07-12 PROCEDURE — 25000003 PHARM REV CODE 250: Performed by: NURSE PRACTITIONER

## 2024-07-12 PROCEDURE — 25000003 PHARM REV CODE 250: Performed by: HOSPITALIST

## 2024-07-12 PROCEDURE — 97530 THERAPEUTIC ACTIVITIES: CPT | Mod: CO

## 2024-07-12 PROCEDURE — 25000003 PHARM REV CODE 250: Performed by: INTERNAL MEDICINE

## 2024-07-12 PROCEDURE — A4216 STERILE WATER/SALINE, 10 ML: HCPCS | Performed by: HOSPITALIST

## 2024-07-12 PROCEDURE — 97535 SELF CARE MNGMENT TRAINING: CPT | Mod: CO

## 2024-07-12 PROCEDURE — 36415 COLL VENOUS BLD VENIPUNCTURE: CPT | Performed by: HOSPITALIST

## 2024-07-12 PROCEDURE — 63700000 PHARM REV CODE 250 ALT 637 W/O HCPCS: Performed by: INTERNAL MEDICINE

## 2024-07-12 PROCEDURE — 83735 ASSAY OF MAGNESIUM: CPT | Performed by: HOSPITALIST

## 2024-07-12 PROCEDURE — 63600175 PHARM REV CODE 636 W HCPCS: Performed by: HOSPITALIST

## 2024-07-12 RX ORDER — FLUCONAZOLE 200 MG/1
200 TABLET ORAL DAILY
Qty: 10 TABLET | Refills: 0 | Status: SHIPPED | OUTPATIENT
Start: 2024-07-13 | End: 2024-07-14

## 2024-07-12 RX ORDER — PANTOPRAZOLE SODIUM 40 MG/1
40 TABLET, DELAYED RELEASE ORAL DAILY
Status: DISCONTINUED | OUTPATIENT
Start: 2024-07-12 | End: 2024-07-14 | Stop reason: HOSPADM

## 2024-07-12 RX ORDER — GUAIFENESIN AND DEXTROMETHORPHAN HYDROBROMIDE 10; 100 MG/5ML; MG/5ML
5 SYRUP ORAL ONCE
Status: COMPLETED | OUTPATIENT
Start: 2024-07-13 | End: 2024-07-13

## 2024-07-12 RX ORDER — FUROSEMIDE 10 MG/ML
40 INJECTION INTRAMUSCULAR; INTRAVENOUS EVERY 12 HOURS
Status: DISCONTINUED | OUTPATIENT
Start: 2024-07-12 | End: 2024-07-12

## 2024-07-12 RX ADMIN — Medication 1 EACH: at 12:07

## 2024-07-12 RX ADMIN — SODIUM CHLORIDE, PRESERVATIVE FREE 10 ML: 5 INJECTION INTRAVENOUS at 11:07

## 2024-07-12 RX ADMIN — ONDANSETRON 4 MG: 2 INJECTION INTRAMUSCULAR; INTRAVENOUS at 05:07

## 2024-07-12 RX ADMIN — OXYCODONE AND ACETAMINOPHEN 1 TABLET: 10; 325 TABLET ORAL at 08:07

## 2024-07-12 RX ADMIN — PANTOPRAZOLE SODIUM 40 MG: 40 TABLET, DELAYED RELEASE ORAL at 09:07

## 2024-07-12 RX ADMIN — FLUCONAZOLE 200 MG: 100 TABLET ORAL at 09:07

## 2024-07-12 RX ADMIN — ASCORBIC ACID, VITAMIN A PALMITATE, CHOLECALCIFEROL, THIAMINE HYDROCHLORIDE, RIBOFLAVIN-5 PHOSPHATE SODIUM, PYRIDOXINE HYDROCHLORIDE, NIACINAMIDE, DEXPANTHENOL, ALPHA-TOCOPHEROL ACETATE, VITAMIN K1, FOLIC ACID, BIOTIN, CYANOCOBALAMIN: 200; 3300; 200; 6; 3.6; 6; 40; 15; 10; 150; 600; 60; 5 INJECTION, SOLUTION INTRAVENOUS at 11:07

## 2024-07-12 RX ADMIN — LAMIVUDINE 150 MG: 150 TABLET, FILM COATED ORAL at 09:07

## 2024-07-12 RX ADMIN — FUROSEMIDE 40 MG: 10 INJECTION, SOLUTION INTRAMUSCULAR; INTRAVENOUS at 09:07

## 2024-07-12 RX ADMIN — INSULIN ASPART 2 UNITS: 100 INJECTION, SOLUTION INTRAVENOUS; SUBCUTANEOUS at 09:07

## 2024-07-12 RX ADMIN — ONDANSETRON 4 MG: 2 INJECTION INTRAMUSCULAR; INTRAVENOUS at 12:07

## 2024-07-12 RX ADMIN — METOCLOPRAMIDE 10 MG: 10 TABLET ORAL at 12:07

## 2024-07-12 RX ADMIN — SODIUM CHLORIDE, PRESERVATIVE FREE 10 ML: 5 INJECTION INTRAVENOUS at 12:07

## 2024-07-12 RX ADMIN — OXYCODONE AND ACETAMINOPHEN 1 TABLET: 10; 325 TABLET ORAL at 09:07

## 2024-07-12 RX ADMIN — INSULIN ASPART 2 UNITS: 100 INJECTION, SOLUTION INTRAVENOUS; SUBCUTANEOUS at 12:07

## 2024-07-12 RX ADMIN — Medication 1 EACH: at 05:07

## 2024-07-12 RX ADMIN — METOCLOPRAMIDE 10 MG: 10 TABLET ORAL at 05:07

## 2024-07-12 RX ADMIN — DIGOXIN 0.12 MG: 125 TABLET ORAL at 09:07

## 2024-07-12 RX ADMIN — SODIUM CHLORIDE, PRESERVATIVE FREE 10 ML: 5 INJECTION INTRAVENOUS at 06:07

## 2024-07-12 RX ADMIN — OXYCODONE AND ACETAMINOPHEN 1 TABLET: 10; 325 TABLET ORAL at 05:07

## 2024-07-12 RX ADMIN — ESCITALOPRAM OXALATE 20 MG: 10 TABLET ORAL at 09:07

## 2024-07-12 RX ADMIN — Medication 1 EACH: at 08:07

## 2024-07-12 RX ADMIN — OXYCODONE AND ACETAMINOPHEN 1 TABLET: 10; 325 TABLET ORAL at 01:07

## 2024-07-12 RX ADMIN — ONDANSETRON 4 MG: 2 INJECTION INTRAMUSCULAR; INTRAVENOUS at 06:07

## 2024-07-12 RX ADMIN — SODIUM CHLORIDE, PRESERVATIVE FREE 10 ML: 5 INJECTION INTRAVENOUS at 05:07

## 2024-07-12 NOTE — PLAN OF CARE
Problem: Adult Inpatient Plan of Care  Goal: Plan of Care Review  Outcome: Progressing     Problem: Adult Inpatient Plan of Care  Goal: Patient-Specific Goal (Individualized)  Outcome: Progressing     Problem: Adult Inpatient Plan of Care  Goal: Optimal Comfort and Wellbeing  Outcome: Progressing     Problem: Diabetes Comorbidity  Goal: Blood Glucose Level Within Targeted Range  Outcome: Progressing     Problem: Wound  Goal: Optimal Coping  Outcome: Progressing     Problem: Skin Injury Risk Increased  Goal: Skin Health and Integrity  Outcome: Progressing

## 2024-07-12 NOTE — CONSULTS
[facility] ([facility])  Adult Nutrition  Progress Note    SUMMARY       Recommendations  1) Monitor PO intake and tolerance   2) Encourage intake of meals + commercial beverages as tolerated  3) Monitor BM regimen r/t constipation  4) RD to monitor and follow up    Goals:   1) Pt able to tolerate initiation and advancement of TPN by RD follow up (goal met)  2) Pt will have diet adv and able to tolerate 50-75% solid PO by RD follow up  Nutrition Goal Status: progressing towards goals  Communication of RD Recs:  (POC)    Assessment and Plan    Endocrine  Severe protein-calorie malnutrition  Malnutrition Type:  Context: acute illness or injury, chronic illness  Level: severe    Related to (etiology):   Altered GI function    Signs and Symptoms (as evidenced by):   Inability to to keep anything down d/t N/V/D  > 10% weight loss in 6 months  Pt states he hasn't been able to eat for 2 months     Malnutrition Characteristic Summary:  Weight Loss (Malnutrition): greater than 10% in 6 months  Energy Intake (Malnutrition): less than 75% for greater than or equal to 1 month (NPO/CLD since admit 6/28)  Muscle Mass (Malnutrition): severe depletion      Interventions (treatment strategy):  Modify rate of PPN  Fiber modified diet  Collaboration with other providers    Nutrition Diagnosis Status:   Continues         Malnutrition Assessment  Malnutrition Context: acute illness or injury, chronic illness  Malnutrition Level: severe  Skin (Micronutrient): bruised, turgor reduced (skin tears easily)       Weight Loss (Malnutrition): greater than 10% in 6 months  Energy Intake (Malnutrition): less than 75% for greater than or equal to 1 month  Muscle Mass (Malnutrition): severe depletion  Fluid Accumulation (Malnutrition): moderate   Orbital Region (Subcutaneous Fat Loss): well nourished  Upper Arm Region (Subcutaneous Fat Loss): severe depletion   Jain Region (Muscle Loss): mild depletion  Clavicle Bone Region (Muscle Loss): mild  depletion  Clavicle and Acromion Bone Region (Muscle Loss): moderate depletion  Patellar Region (Muscle Loss): moderate depletion  Anterior Thigh Region (Muscle Loss): severe depletion  Posterior Calf Region (Muscle Loss): severe depletion                 Reason for Assessment    Reason For Assessment: RD follow-up  Diagnosis: gastrointestinal disease (colitis)  Relevant Medical History:   Patient Active Problem List   Diagnosis    Hepatitis B core antibody positive    NAFLD (nonalcoholic fatty liver disease)    Essential hypertension    Chronic diastolic heart failure    Type 2 diabetes mellitus, with long-term current use of insulin    Class 1 obesity due to excess calories with serious comorbidity and body mass index (BMI) of 34.0 to 34.9 in adult    Tobacco use disorder    Polycythemia, secondary    Canker sores oral    Coronary artery disease    Chronic use of opiate drug for therapeutic purpose    Rheumatoid arthritis flare    Fatigue    Acute on chronic diastolic heart failure    Supraventricular tachycardia    Hyponatremia    Atherosclerotic peripheral vascular disease    Folliculitis    Rheumatoid arthritis involving multiple sites with positive rheumatoid factor    Anxiety    Long term (current) use of systemic steroids    Vitamin D deficiency    Osteoporosis    Hyperlipidemia    Severe obesity    Type 2 diabetes mellitus with other circulatory complication, with long-term current use of insulin    Nuclear sclerotic cataract of right eye    Lumbar vertebral fracture    Intractable pain    Diarrhea of presumed infectious origin    Enteritis    Nephrolithiasis    Constipation    Depression    Testicular pain, left    Fatty (change of) liver, not elsewhere classified    Colitis    SVT (supraventricular tachycardia)    Severe protein-calorie malnutrition    Abnormal LFTs (liver function tests)     Interdisciplinary Rounds: did not attend  General Information Comments:   NPO/liquid diet since admit 6/28 with  diet advancement to low fiber diet on . Pt able to tolerate a little bit more of his meals. Some intermittent nausea. Clinimix-E 4.25/5 ordered at 85 mL/hr. MD to wean off PPN after today. Last Bowel Movement: 24. Mild to moderate edema noted. PIV and midline. Felipe 17 - L arm, RLQ, R hand.   Nutrition Discharge Planning: pending medical course    Nutrition Related Social Determinants of Health: SDOH: Adequate food in home environment    Nutrition Risk Screen    Nutrition Risk Screen: unintentional loss of 10 lbs or more in the past 2 months, reduced oral intake over the last month    Nutrition/Diet History    Spiritual, Cultural Beliefs, Gnosticism Practices, Values that Affect Care: no  Factors Affecting Nutritional Intake: abdominal distension, altered gastrointestinal function, decreased appetite    Anthropometrics    Temp: 97.8 °F (36.6 °C)  Height Method: Stated  Height: 6' (182.9 cm)  Height (inches): 72 in  Weight Method: Bed Scale  Weight: 83.1 kg (183 lb 3.2 oz)  Weight (lb): 183.2 lb  Ideal Body Weight (IBW), Male: 178 lb  % Ideal Body Weight, Male (lb): 100.94 %  BMI (Calculated): 24.8  BMI Grade: 18.5-24.9 - normal  Weight Loss: unintentional  Usual Body Weight (UBW), k kg  % Usual Body Weight: 81.67  % Weight Change From Usual Weight: -18.5 %       Lab/Procedures/Meds    Pertinent Labs Reviewed: reviewed  CBC:  Recent Labs   Lab 24   WBC 5.56   HGB 10.1*   HCT 30.9*        CMP:  Recent Labs   Lab 24   CALCIUM 7.6*   ALBUMIN 1.4*   PROT 5.3*   *   K 4.7   CO2 23      BUN 11   CREATININE 0.6   ALKPHOS 792*   ALT 50*   AST 91*   BILITOT 2.1*     BMP:   Recent Labs   Lab 24   *   *   K 4.7      CO2 23   BUN 11   CREATININE 0.6   CALCIUM 7.6*   MG 1.8     Triglycerides   Date Value Ref Range Status   2024 218 (H) 30 - 150 mg/dL Final     Comment:     The National Cholesterol Education Program (NCEP) has set  the  following guidelines (reference values) for triglycerides:  Normal......................<150 mg/dL  Borderline High.............150-199 mg/dL  High........................200-499 mg/dL           Pertinent Medications Reviewed: reviewed  Scheduled Meds:   digoxin  0.125 mg Oral Daily    EScitalopram oxalate  20 mg Oral QHS    fluconazole  200 mg Oral Daily    furosemide (LASIX) injection  40 mg Intravenous Q12H    lactobacillus acidophilus & bulgar  1 packet Oral TID WM    lamiVUDine  150 mg Oral QHS    LIDOcaine  1 patch Transdermal Q24H    metoclopramide HCl  10 mg Oral TID AC    ondansetron  4 mg Intravenous Q6H    pantoprazole  40 mg Oral Daily    sodium chloride 0.9%  10 mL Intravenous Q6H     Continuous Infusions:   Amino acid 4.25% - dextrose 5% (CLINIMIX-E) solution with additives (1L provides 42.5 gm AA, 50 gm CHO (170 kcal/L dextrose), Na 35, K 30, Mg 5, Ca 4.5, Acetate 70, Cl 39, Phos 15)   Intravenous Continuous 85 mL/hr at 07/11/24 2123 New Bag at 07/11/24 2123     PRN Meds:.  Current Facility-Administered Medications:     acetaminophen, 650 mg, Oral, Q8H PRN    dextromethorphan-guaiFENesin  mg/5 ml, 10 mL, Oral, Q4H PRN    dextrose 10%, 12.5 g, Intravenous, PRN    dextrose 10%, 25 g, Intravenous, PRN    glucagon (human recombinant), 1 mg, Intramuscular, PRN    glucagon (human recombinant), 1 mg, Intramuscular, PRN    glucose, 16 g, Oral, PRN    glucose, 24 g, Oral, PRN    HYDROmorphone, 0.4 mg, Intravenous, Q5 Min PRN    hydrOXYzine HCL, 50 mg, Oral, QID PRN    insulin aspart U-100, 0-5 Units, Subcutaneous, QID (AC + HS) PRN    loperamide, 1 mg, Oral, QID PRN    melatonin, 6 mg, Oral, Nightly PRN    naloxone, 0.02 mg, Intravenous, PRN    ondansetron, 4 mg, Intravenous, Daily PRN    oxyCODONE, 5 mg, Oral, Q3H PRN    oxyCODONE-acetaminophen, 1 tablet, Oral, Q4H PRN    prochlorperazine, 5 mg, Intravenous, Q6H PRN    Flushing PICC/Midline Protocol, , , Until Discontinued **AND** sodium chloride  0.9%, 10 mL, Intravenous, Q6H **AND** sodium chloride 0.9%, 10 mL, Intravenous, PRN    sodium chloride 0.9%, 3 mL, Intravenous, PRN    Estimated/Assessed Needs    Weight Used For Calorie Calculations: 81.5 kg (179 lb 10.8 oz)  Energy Calorie Requirements (kcal): 2037-2445  Energy Need Method: Kcal/kg (25-30)  Protein Requirements:  g (1.2-1.5 g/kg during colitis flare)  Weight Used For Protein Calculations: 81.5 kg (179 lb 10.8 oz)  Fluid Requirements (mL): 1 mL/kcal  Estimated Fluid Requirement Method:  (or per MD)  RDA Method (mL): 2037  CHO Requirement: 250 g (50% EEN)      Nutrition Prescription Ordered    Current Diet Order: Low fiber  Current Nutrition Support Formula Ordered: Clinimix 4.25/5  Current Nutrition Support Rate Ordered: 85 (ml)  Current Nutrition Support Frequency Ordered: mL/hr  Oral Nutrition Supplement: Commercial beverages TID    Evaluation of Received Nutrient/Fluid Intake    Parenteral Calories (kcal): 694  Parenteral Protein (gm): 87  Parenteral Fluid (mL): 2040  Lipid Calories (kcals): 500 kcals  GIR (Glucose Infusion Rate) (mg/kg/min): 0.85 mg/kg/min  Oral Calories (kcal): 266  Oral Protein (gm): 15  % Kcal Needs: 59% (PPN)  % Protein Needs: 89% (PPN)  I/O: + 6.4 L since admit  Energy Calories Required: not meeting needs  Protein Required: not meeting needs  Fluid Required: not meeting needs  Comments: LBM: 7/5/2024  Tolerance: not tolerating  % Intake of Estimated Energy Needs: 50 - 75 %  % Meal Intake: 25 - 50 %    Nutrition Risk    Level of Risk/Frequency of Follow-up: moderate - high (follow up: 2-3 x per week)     Monitor and Evaluation    Food and Nutrient Intake: energy intake, food and beverage intake, parenteral nutrition intake  Food and Nutrient Adminstration: diet order, enteral and parenteral nutrition administration  Knowledge/Beliefs/Attitudes: food and nutrition knowledge/skill, beliefs and attitudes  Physical Activity and Function: nutrition-related ADLs and  IADLs  Anthropometric Measurements: weight, weight change  Biochemical Data, Medical Tests and Procedures: electrolyte and renal panel, gastrointestinal profile, glucose/endocrine profile, inflammatory profile, lipid profile     Nutrition Follow-Up    RD Follow-up?: Yes    Radha Quintero RDN, FARHANAN

## 2024-07-12 NOTE — PLAN OF CARE
I certify I provided patient choice and a list to the patient/family of CMS rated Home Health, SNF, IRF, [facility], [facility]  Patient/Family signed Patient's Choice Disclosure Form choosing the following    1.[facility]       07/12/24 1534   Post-Acute Status   Post-Acute Authorization Home Health   Home Health Status Set-up Complete/Auth obtained   Patient choice form signed by patient/caregiver List with quality metrics by geographic area provided;List from CMS Compare;List from System Post-Acute Care   Discharge Delays (!) Post-Acute Set-up   Discharge Plan   Discharge Plan A Home Health   Discharge Plan B Home with family     [facility] to resume care on discharge.

## 2024-07-12 NOTE — PROGRESS NOTES
[facility] Medicine  Progress Note    Patient Name: Nithin Wagner  MRN: [id number]  Patient Class: IP- Inpatient   Admission Date: 6/28/2024  Length of Stay: 12 days  Attending Physician: Darby Obrien MD  Primary Care Provider: Tushar Drew MD        Subjective:     Principal Problem:Colitis        HPI:  Nithin Wagner is a 68 y.o. male with a past medical history of hepatitis-B, hypertension, diastolic heart failure, diabetes, rheumatoid arthritis, hyponatremia, s/p s/p T11 and L3 kyphoplasty on 4/22/2024 who presents to the ED with lower abdominal pain recurrent onset 4 days ago.  Patient reports associated nausea and vomiting and states he has been experiencing intermittent diarrhea over the past several months.  He denies blood in stool.  Patient  was seen here on i 06/26/2024 for emesis and weakness. Workup showed findings suggestive of colitis and dehydration. He was offered admission at that time but he stated he would prefer to go home. Stated he went home and was unable to hold down significant fluids and came back.  Reports subjective fever a few days ago.  Had a normal bowel movement this morning     ED workup significant for unremarkable CBC, mild hyponatremia 132, T bili 4.1, albumin 1.6,  and .  Lactic acid 1.2.  CT abdomen performed 06/26/2024 showed diffuse colitis.  Patient was started on IV Cipro and Flagyl in the ED and referred to Hospital Medicine.  Patient will be admitted for further evaluation and management.    Overview/Hospital Course:  Mr. Wagner presented with abdominal pain with nausea and vomiting.  Placed in observation status.  Treatment initiated for colitis with IV Cipro and Flagyl, IV fluids, antiemetics and supportive care.  GI consulted. Underwent paracentesis. LFTs gradually improved though appetite remained poor. Completed course of antibiotic therapy. Autoimmune workup for liver disease unremarkable. Scheduled antiemetics with  improvement in symptoms though not in appetite. Started PPN. EGD performed 07/08 demonstrating white plaques in esophagus and gastritis; started fluconazole.    Interval History: No acute events overnight. Mild pain; able to eat more than yesterday. Feeling a little better but still weak.    Review of Systems   Constitutional:  Negative for chills and fever.   Respiratory:  Negative for cough and shortness of breath.    Cardiovascular:  Negative for chest pain and palpitations.   Gastrointestinal:  Negative for abdominal pain, nausea and vomiting.   Neurological:  Positive for weakness.     Objective:     Vital Signs (Most Recent):  Temp: 98 °F (36.7 °C) (07/11/24 1537)  Pulse: 106 (07/11/24 1537)  Resp: 17 (07/11/24 1537)  BP: 106/66 (07/11/24 1537)  SpO2: (!) 90 % (07/11/24 1537) Vital Signs (24h Range):  Temp:  [97.8 °F (36.6 °C)-99.6 °F (37.6 °C)] 98 °F (36.7 °C)  Pulse:  [100-118] 106  Resp:  [16-18] 17  SpO2:  [90 %-95 %] 90 %  BP: ()/(56-80) 106/66     Weight: 83.1 kg (183 lb 3.2 oz)  Body mass index is 24.85 kg/m².    Intake/Output Summary (Last 24 hours) at 7/11/2024 2013  Last data filed at 7/11/2024 1619  Gross per 24 hour   Intake --   Output 750 ml   Net -750 ml         Physical Exam  Vitals and nursing note reviewed.   Constitutional:       General: He is not in acute distress.     Appearance: He is well-developed.   HENT:      Head: Normocephalic and atraumatic.   Eyes:      General:         Right eye: No discharge.         Left eye: No discharge.      Conjunctiva/sclera: Conjunctivae normal.   Cardiovascular:      Rate and Rhythm: Normal rate.      Pulses: Normal pulses.   Pulmonary:      Effort: Pulmonary effort is normal. No respiratory distress.   Abdominal:      General: There is distension.      Palpations: Abdomen is soft.      Tenderness: There is abdominal tenderness.   Musculoskeletal:         General: Normal range of motion.      Right lower leg: Edema present.      Left lower leg:  Edema present.   Skin:     General: Skin is warm and dry.   Neurological:      Mental Status: He is alert and oriented to person, place, and time.           Significant Labs:   CBC:  Recent Labs   Lab 07/09/24  0518 07/10/24  0621 07/11/24  0529   WBC 6.49 6.46 7.09   HGB 11.5* 10.4* 10.9*   HCT 34.5* 31.6* 33.6*   * 141* 175   GRAN 62.5  4.1 73.2*  4.7 68.9  4.9   LYMPH 22.5  1.5 15.5*  1.0 16.8*  1.2   MONO 12.2  0.8 9.1  0.6 11.0  0.8   EOS 0.1 0.1 0.1   BASO 0.05 0.05 0.06   CMP:  Recent Labs   Lab 07/09/24  0518 07/10/24  0621 07/11/24  0529   * 131* 130*   K 4.7 4.8 4.8    100 99   CO2 25 26 25   BUN 8 9 11   CREATININE 0.6 0.6 0.6   * 183* 165*   CALCIUM 7.4* 7.4* 7.7*   MG 1.9 1.8 1.8   PHOS 2.8 2.8 3.5   ALKPHOS 564* 633* 709*   AST 67* 73* 85*   ALT 41 41 48*   BILITOT 2.6* 2.4* 2.3*   PROT 4.7* 4.8* 5.5*   ALBUMIN 1.3* 1.3* 1.4*   ANIONGAP 5* 5* 6*      Significant Imaging: I have reviewed and interpreted all pertinent imaging results/findings within the past 24 hours.    Assessment/Plan:      * Colitis  Abnormal LFTs  Hypokalemia  - Patient with intermittent nausea, vomiting and diarrhea.  Reports subjective fever.  CT abdomen with findings concerning for colitis.   - Completed 7 days of ciprofloxacin/metronidazole.  - Liver function tests with some improvement, cholestatic pattern initially.  - Patient with some clinical improvement in symptoms and stool studies negative for C.diff  - MRCP was negative for obstruction, noted gallbladder distention and large amount of ascites  - Case discussed with GI; paracentesis 07/02 demonstrating SAAG 1.1. NITIN, anti-mitochondrial, ASMA negative.  - Replete lytes as required. Monitor PO intake. Continue ondansetron, metoclopramide, continue PPN.  - EGD remarkable for Candida esophagitis and patient was started on fluconazole on 7/8  - Clinically improving with decreased pain and increased p.o. intake, will wean off PPN after  today  - Appreciate GI assistance.    SVT (supraventricular tachycardia)  - History SVT noted.  Followed by Cardiology, Dr. Doshi, outpatient.  States he takes digoxin and has had no recent episodes of SVT  - Continue digoxin 0.125 mg p.o. daily  - Monitor on telemetry    Type 2 diabetes mellitus, with long-term current use of insulin  - Recent HgbA1c 7.4 performed on 3/2024.  Patient has not been taking insulin and was on tirzepeptide and stopped taking in April  - Glucose level reviewed, in normal to slightly elevated range  - Continue monitor with Accu-Cheks q.a.c. and q.h.s. and give low-dose sliding scale insulin as necessary    Chronic diastolic heart failure  - Recent Echo performed 04/03/2024 showed EF 55-60%.  Patient states he no longer takes Lasix.  Lower extremity edema noted, patient denies shortness of breath  - Clinically appears near euvolemic though with anasarca due to liver disease / hypoalbuminemia.    NAFLD (nonalcoholic fatty liver disease)  - History noted.  LFTs on admission elevated from baseline with , T bili 4.1,  and .  - As discussed above    Hepatitis B core antibody positive  - Patient states he was exposed to hep B while working in [place] in the 1990s and is on prophylaxis with lamivudine   - Continue lamivudine daily      VTE Risk Mitigation (From admission, onward)           Ordered     Place CHELE hose  Until discontinued         07/03/24 2056     IP VTE HIGH RISK PATIENT  Once         06/28/24 2107     Place sequential compression device  Until discontinued         06/28/24 2107                          Darby Obrien MD  Department of Hospital Medicine   [facility])

## 2024-07-12 NOTE — SUBJECTIVE & OBJECTIVE
Interval History: No acute events overnight. Mild pain; able to eat more than yesterday. Feeling a little better but still weak.    Review of Systems   Constitutional:  Negative for chills and fever.   Respiratory:  Negative for cough and shortness of breath.    Cardiovascular:  Negative for chest pain and palpitations.   Gastrointestinal:  Negative for abdominal pain, nausea and vomiting.   Neurological:  Positive for weakness.     Objective:     Vital Signs (Most Recent):  Temp: 98 °F (36.7 °C) (07/11/24 1537)  Pulse: 106 (07/11/24 1537)  Resp: 17 (07/11/24 1537)  BP: 106/66 (07/11/24 1537)  SpO2: (!) 90 % (07/11/24 1537) Vital Signs (24h Range):  Temp:  [97.8 °F (36.6 °C)-99.6 °F (37.6 °C)] 98 °F (36.7 °C)  Pulse:  [100-118] 106  Resp:  [16-18] 17  SpO2:  [90 %-95 %] 90 %  BP: ()/(56-80) 106/66     Weight: 83.1 kg (183 lb 3.2 oz)  Body mass index is 24.85 kg/m².    Intake/Output Summary (Last 24 hours) at 7/11/2024 2013  Last data filed at 7/11/2024 1619  Gross per 24 hour   Intake --   Output 750 ml   Net -750 ml         Physical Exam  Vitals and nursing note reviewed.   Constitutional:       General: He is not in acute distress.     Appearance: He is well-developed.   HENT:      Head: Normocephalic and atraumatic.   Eyes:      General:         Right eye: No discharge.         Left eye: No discharge.      Conjunctiva/sclera: Conjunctivae normal.   Cardiovascular:      Rate and Rhythm: Normal rate.      Pulses: Normal pulses.   Pulmonary:      Effort: Pulmonary effort is normal. No respiratory distress.   Abdominal:      General: There is distension.      Palpations: Abdomen is soft.      Tenderness: There is abdominal tenderness.   Musculoskeletal:         General: Normal range of motion.      Right lower leg: Edema present.      Left lower leg: Edema present.   Skin:     General: Skin is warm and dry.   Neurological:      Mental Status: He is alert and oriented to person, place, and time.            Significant Labs:   CBC:  Recent Labs   Lab 07/09/24  0518 07/10/24  0621 07/11/24  0529   WBC 6.49 6.46 7.09   HGB 11.5* 10.4* 10.9*   HCT 34.5* 31.6* 33.6*   * 141* 175   GRAN 62.5  4.1 73.2*  4.7 68.9  4.9   LYMPH 22.5  1.5 15.5*  1.0 16.8*  1.2   MONO 12.2  0.8 9.1  0.6 11.0  0.8   EOS 0.1 0.1 0.1   BASO 0.05 0.05 0.06   CMP:  Recent Labs   Lab 07/09/24  0518 07/10/24  0621 07/11/24  0529   * 131* 130*   K 4.7 4.8 4.8    100 99   CO2 25 26 25   BUN 8 9 11   CREATININE 0.6 0.6 0.6   * 183* 165*   CALCIUM 7.4* 7.4* 7.7*   MG 1.9 1.8 1.8   PHOS 2.8 2.8 3.5   ALKPHOS 564* 633* 709*   AST 67* 73* 85*   ALT 41 41 48*   BILITOT 2.6* 2.4* 2.3*   PROT 4.7* 4.8* 5.5*   ALBUMIN 1.3* 1.3* 1.4*   ANIONGAP 5* 5* 6*      Significant Imaging: I have reviewed and interpreted all pertinent imaging results/findings within the past 24 hours.

## 2024-07-12 NOTE — PT/OT/SLP PROGRESS
Occupational Therapy   Treatment    Name: Nithin Wagner  MRN: [id number]  Admitting Diagnosis:  Colitis  4 Days Post-Op    Recommendations:     Discharge Recommendations: Low Intensity Therapy (with 24/7 assistance)  Discharge Equipment Recommendations:  none  Barriers to discharge:  Decreased caregiver support (current functional level)    Assessment:     Nithin Wagner is a 68 y.o. male with a medical diagnosis of Colitis.  He presents with back pain. Performance deficits affecting function are weakness, impaired endurance, impaired self care skills, impaired functional mobility, gait instability, impaired balance, decreased coordination, decreased upper extremity function, decreased lower extremity function, decreased safety awareness, impaired skin, edema, decreased ROM, pain, impaired joint extensibility. Patient defer ambulation d/t back pain though needing assistance with using urinal while standing.     Rehab Prognosis:  Good and Fair; patient would benefit from acute skilled OT services to address these deficits and reach maximum level of function.       Plan:     Patient to be seen 4 x/week to address the above listed problems via self-care/home management, therapeutic activities, therapeutic exercises  Plan of Care Expires: 08/02/24  Plan of Care Reviewed with: patient    Subjective     Chief Complaint: back pain  Patient/Family Comments/goals: needing to use the urinal   Pain/Comfort:  Pain Rating 1: 9/10  Location - Side 1: Bilateral  Location - Orientation 1: generalized  Location 1: back  Pain Addressed 1: Pre-medicate for activity, Reposition, Distraction, Cessation of Activity  Pain Rating Post-Intervention 1: 0/10 (at rest)    Objective:     Communicated with: RN prior to session.  Patient found left sidelying with peripheral IV upon OT entry to room.    General Precautions: Standard, diabetic, fall    Orthopedic Precautions:N/A  Braces: N/A  Respiratory Status: Room air     Occupational Performance:      Bed Mobility:    Supine <> Sit: Min A with 2 trials   2 trial for body readjustment and comfort      Functional Mobility/Transfers:  Sit > Stand: CGA with increase of time to perform   Functional Mobility: CGA, RW for side stepping     Activities of Daily Living:  Toileting: CGA for using urinal while standing   Grooming: Set up for hand hygiene while seated EOB       AMPAC 6 Click ADL: 16    Treatment & Education:  OT role, plan of care, progression of goals, importance of continued OOB activity, ADL/functional transfer and mobility retraining, discharge recommendation, call don't fall, safety precautions, fall prevention.   *importance of OOB activity and participating in therapy   *benefits and importance of sitting UIC during the day   *Log rolling for back pain relief     Patient left supine with all lines intact, call button in reach, and RN notified    GOALS:   Multidisciplinary Problems       Occupational Therapy Goals          Problem: Occupational Therapy    Goal Priority Disciplines Outcome Interventions   Occupational Therapy Goal     OT, PT/OT Progressing    Description: 1. Commode T/F with RW with SPV  2. Toileting with SPV  3. Two G/H tasks standing at sink with RW and SPV  4. UB dressing with Setup  5. LB dressing adhering to spinal precautions with Setup/SPV, AE PRN                       Time Tracking:     OT Date of Treatment: 07/12/24  OT Start Time: 1045  OT Stop Time: 1108  OT Total Time (min): 23 min    Billable Minutes:Self Care/Home Management 8 min  Therapeutic Activity 15 min    OT/MARS: MARS     Number of MARS visits since last OT visit: 1    7/12/2024

## 2024-07-12 NOTE — CONSULTS
[facility]    Date of admission:  6/28/2024     Reason for Consult:    Known to you/CHF    HPI:    This 68-year-old male was admitted 2 weeks ago with colitis.  He has been making gradual progress.  Asked to see him today regarding his history of heart failure.  Also has history of coronary artery disease.  He reports that he has feeling a bit congested.  On my arrival in the room he was comfortably lying supine in no respiratory distress with no supplemental oxygen.    Medications  Current Facility-Administered Medications   Medication Dose Route Frequency Provider Last Rate Last Admin    acetaminophen tablet 650 mg  650 mg Oral Q8H PRN Valeria Patel NP   650 mg at 07/09/24 1627    Amino acid 4.25% - dextrose 5% (CLINIMIX-E) solution with additives (1L provides 42.5 gm AA, 50 gm CHO (170 kcal/L dextrose), Na 35, K 30, Mg 5, Ca 4.5, Acetate 70, Cl 39, Phos 15)   Intravenous Continuous Roger Davis NP 85 mL/hr at 07/11/24 2123 New Bag at 07/11/24 2123    dextromethorphan-guaiFENesin  mg/5 ml liquid 10 mL  10 mL Oral Q4H PRN CARISA Lozano MD        dextrose 10% bolus 125 mL 125 mL  12.5 g Intravenous PRN Valeria Patel NP        dextrose 10% bolus 250 mL 250 mL  25 g Intravenous PRN Valeria Patel, BASIA        digoxin tablet 0.125 mg  0.125 mg Oral Daily Valeria Patel NP   0.125 mg at 07/12/24 0952    EScitalopram oxalate tablet 20 mg  20 mg Oral QHS Valeria Patel NP   20 mg at 07/11/24 2026    fluconazole tablet 200 mg  200 mg Oral Daily CARISA Lozano MD   200 mg at 07/12/24 0952    furosemide injection 40 mg  40 mg Intravenous Q12H Darby Obrien MD   40 mg at 07/12/24 0952    glucagon (human recombinant) injection 1 mg  1 mg Intramuscular PRN Valeria Patel, NP        glucagon (human recombinant) injection 1 mg  1 mg Intramuscular PRN Chilo Urban MD        glucose chewable tablet 16 g  16 g Oral PRN Valeria Patel, NP   16 g at 07/05/24  0012    glucose chewable tablet 24 g  24 g Oral PRN Valeria Patel, BASIA        HYDROmorphone (PF) injection 0.4 mg  0.4 mg Intravenous Q5 Min PRN Chilo Urban MD   0.4 mg at 07/11/24 2315    hydrOXYzine HCL tablet 50 mg  50 mg Oral QID PRN CARISA Lozano MD   50 mg at 07/09/24 2143    insulin aspart U-100 pen 0-5 Units  0-5 Units Subcutaneous QID (AC + HS) PRN Valeria Patel NP   2 Units at 07/12/24 1211    lactobacillus acidophilus & bulgar 100 million cell packet 1 each  1 packet Oral TID  CARISA Lozano MD   1 each at 07/12/24 1210    lamiVUDine tablet 150 mg  150 mg Oral QHS Valeria Patel, NP   150 mg at 07/11/24 2026    LIDOcaine 5 % patch 1 patch  1 patch Transdermal Q24H Valeria Patel NP   1 patch at 07/07/24 2129    loperamide 1 mg/7.5 mL solution 1 mg  1 mg Oral QID PRN Janet Win MD        melatonin tablet 6 mg  6 mg Oral Nightly PRN Valeria Patel NP   6 mg at 07/03/24 2354    metoclopramide HCl tablet 10 mg  10 mg Oral TID AC CARISA Lozano MD   10 mg at 07/12/24 1200    naloxone 0.4 mg/mL injection 0.02 mg  0.02 mg Intravenous PRN Valeria Patel NP        ondansetron injection 4 mg  4 mg Intravenous Q6H CARISA Lozano MD   4 mg at 07/12/24 1209    ondansetron injection 4 mg  4 mg Intravenous Daily PRN Chilo Urban MD   4 mg at 07/12/24 0500    oxyCODONE immediate release tablet 5 mg  5 mg Oral Q3H PRN Chilo Urban MD   5 mg at 07/10/24 1202    oxyCODONE-acetaminophen  mg per tablet 1 tablet  1 tablet Oral Q4H PRN CARISA Lozano MD   1 tablet at 07/12/24 0952    pantoprazole EC tablet 40 mg  40 mg Oral Daily Darby Obrien MD   40 mg at 07/12/24 0952    prochlorperazine injection Soln 5 mg  5 mg Intravenous Q6H PRN CARISA Lozano MD   5 mg at 07/05/24 0241    sodium chloride 0.9% flush 10 mL  10 mL Intravenous Q6H Darby Obrien MD   10 mL at 07/12/24 0504    And    sodium chloride 0.9% flush 10 mL  10 mL Intravenous PRN Micah,  "Darby BOWDEN MD   10 mL at 07/09/24 1149    sodium chloride 0.9% flush 3 mL  3 mL Intravenous PRN Chilo Urban MD          Prior to Admission medications    Medication Sig Start Date End Date Taking? Authorizing Provider   diclofenac sodium (SOLARAZE) 3 % gel Apply to affected area three times a day. 5/24/24  Yes    digoxin (LANOXIN) 125 mcg tablet TAKE 1 TABLET BY MOUTH ONCE DAILY. 11/6/23  Yes Shemar Dempsey MD   ergocalciferol (ERGOCALCIFEROL) 50,000 unit Cap Take 1 capsule (50,000 Units total) by mouth every 7 days. 1/5/24  Yes Izabel Marc NP   EScitalopram oxalate (LEXAPRO) 20 MG tablet Take 20 mg by mouth every evening. 3/20/24  Yes Provider, Historical   gabapentin (NEURONTIN) 400 MG capsule Take 1 capsule (400 mg total) by mouth 3 (three) times daily. 5/7/24 5/7/25 Yes Sung Scherer MD   lamiVUDine (EPIVIR) 150 MG Tab TAKE 1 TABLET BY MOUTH EVERY DAY 1/26/24  Yes Nithin Garza MD   multivitamin (THERAGRAN) per tablet Take 1 tablet by mouth once daily.   Yes Provider, Historical   nitroGLYCERIN (NITROSTAT) 0.4 MG SL tablet PLACE 1 TABLET UNDER THE TONGUE EVERY 5 MINUTES AS NEEDED FOR CHEST PAIN. 1/2/24  Yes Shemar Dempsey MD   oxyCODONE (ROXICODONE) 10 mg Tab immediate release tablet Take 1 tablet (10 mg total) by mouth every 6 (six) hours as needed severe pain, medically necessary greater than 7 days 6/6/24  Yes    aspirin (ECOTRIN) 81 MG EC tablet Take 1 tablet (81 mg total) by mouth once daily. 5/8/24 5/8/25  Sung Scherer MD   BD ULTRA-FINE RAHUL PEN NEEDLE 32 gauge x 5/32" Ndle To use 6 daily 11/24/23   Izabel Marc NP   folic acid (FOLVITE) 1 MG tablet Take 1 tablet (1 mg total) by mouth once daily. 1/25/23 4/17/24  Giovanni Adkins NP   HYDROcodone-acetaminophen 7.5-300 mg Tab take 1 tablet by mouth every 6 hours as needed for pain 6/21/24      HYDROcodone-acetaminophen 7.5-300 mg Tab Take 1 tablet by mouth every 6 (six) hours as needed for pain. 6/28/24    "   insulin pen,reusable,BT,aspart (INPEN, NOVOLOG OR FIASP, PINK) InPn To use with inpen cartridges 5/21/24   Izabel Marc, NP   ketoconazole (NIZORAL) 2 % cream Apply to affected area DAILY 4/5/24      Lactobacillus rhamnosus GG (CULTURELLE) 10 billion cell capsule Take 1 capsule by mouth once daily. 5/7/24   Sung Scherer MD   LIDOcaine (LIDODERM) 5 % Place 1 patch onto the skin once daily. Remove & Discard patch within 12 hours or as directed by MD 5/7/24   Sung Scherer MD   naloxone (NARCAN) 4 mg/actuation Spry USE AS DIRECTED INTRANASAL FOR SUSPECTED DRUG OVERDOSE 6/6/24      nystatin (MYCOSTATIN) powder Apply topically 4 (four) times daily. Apply to intertriginous areas as directed up to four times daily to reduce moisture. for 14 days 2/19/23 3/14/24  Lamar Witt PA-C   oxyCODONE (ROXICODONE) 10 mg Tab immediate release tablet Take 1 tablet by mouth every 8 hours as needed severe pain, medically necessary greater than 7 days; HOLD HYDROCODONE/ACETAMENOPHEN 5/25/24      oxyCODONE (ROXICODONE) 10 mg Tab immediate release tablet Take 1 tablet by mouth every 6 hours as needed for severe pain, medically necessary greater than 7 day supply 6/14/24      polyethylene glycol (GLYCOLAX) 17 gram/dose powder Use cap to measure 17 grams, mix in liquid and take by mouth once daily. 5/7/24   Sung Scherer MD   PROAIR HFA 90 mcg/actuation inhaler  4/26/16   Provider, Historical   tirzepatide 7.5 mg/0.5 mL PnIj Inject 7.5 mg into the skin every 7 days. 3/7/24   Veronique Regan MD       History  Past Medical History:   Diagnosis Date    Arthritis     Atrial myxoma     Coronary atherosclerosis 2020    stents x 3    Diabetes mellitus, type 2     Difficult intubation     Heart failure     Hepatitis B     HPV (human papilloma virus) infection     Hyperlipidemia     Hypertension     Non-alcoholic fatty liver disease     Rheumatoid arthritis     Rheumatoid arthritis flare 07/12/2021    Squamous cell  carcinoma of forehead 10/26/2023    forehead    Stroke     TIA     Past Surgical History:   Procedure Laterality Date    CATARACT EXTRACTION W/  INTRAOCULAR LENS IMPLANT Right 3/28/2024    Procedure: EXTRACTION, CATARACT, WITH IOL INSERTION;  Surgeon: Hans Woodward MD;  Location: [facility] OR;  Service: Ophthalmology;  Laterality: Right;    CORONARY ANGIOGRAPHY N/A 3/10/2021    Procedure: ANGIOGRAM, CORONARY ARTERY - right radial;  Surgeon: Shemar Dempsey MD;  Location: [facility] CATH LAB;  Service: Cardiology;  Laterality: N/A;    CORONARY STENT PLACEMENT  03/10/2021    prox-mid RCA Marshal 4.5 x 26 mm, 4.5 x 12 mm    ESOPHAGOGASTRODUODENOSCOPY N/A 7/8/2024    Procedure: EGD (ESOPHAGOGASTRODUODENOSCOPY);  Surgeon: Janet Win MD;  Location: [facility] ENDO;  Service: Endoscopy;  Laterality: N/A;    FIXATION KYPHOPLASTY N/A 4/22/2024    Procedure: KYPHOPLASTY;  Surgeon: Stefan Stern MD;  Location: [facility] OR;  Service: Pain Management;  Laterality: N/A;  NO PROPOFOL    INCISION AND DRAINAGE OF SCROTUM N/A 11/15/2022    Procedure: INCISION AND DRAINAGE, SCROTUM;  Surgeon: William Hatch MD;  Location: [facility];  Service: Urology;  Laterality: N/A;    INCISION AND DRAINAGE OF SCROTUM N/A 11/17/2022    Procedure: INCISION AND DRAINAGE, SCROTUM;  Surgeon: William Hatch MD;  Location: [facility] OR;  Service: Urology;  Laterality: N/A;    LUNG LOBECTOMY Right 2008    RUL lobectomy after removal of atrial myxoma    MAGNETIC RESONANCE IMAGING N/A 4/30/2024    Procedure: MRI (Magnetic Resonance Imagine);  Surgeon: Peace Watts;  Location: [facility];  Service: Anesthesiology;  Laterality: N/A;    PERITONEOCENTESIS N/A 7/2/2024    Procedure: PARACENTESIS, ABDOMINAL;  Surgeon: Ventura Jordan MD;  Location: [facility] CATH LAB;  Service: Radiology;  Laterality: N/A;    PLEURA BIOPSY      RESECTION OF ATRIAL MYXOMA  2007     Social History     Socioeconomic History    Marital status: Single   Occupational History    Occupation: Biomedical  , respiratory therapist, [employer]     Comment: Retired   Tobacco Use    Smoking status: Every Day     Current packs/day: 1.00     Average packs/day: 1 pack/day for 35.0 years (35.0 ttl pk-yrs)     Types: Cigarettes    Smokeless tobacco: Never   Substance and Sexual Activity    Alcohol use: Not Currently    Drug use: No    Sexual activity: Never     Social Determinants of Health     Financial Resource Strain: Low Risk  (6/29/2024)    Overall Financial Resource Strain (CARDIA)     Difficulty of Paying Living Expenses: Not very hard   Food Insecurity: No Food Insecurity (6/29/2024)    Hunger Vital Sign     Worried About Running Out of Food in the Last Year: Never true     Ran Out of Food in the Last Year: Never true   Transportation Needs: No Transportation Needs (6/29/2024)    TRANSPORTATION NEEDS     Transportation : No   Physical Activity: Inactive (6/29/2024)    Exercise Vital Sign     Days of Exercise per Week: 0 days     Minutes of Exercise per Session: 0 min   Stress: No Stress Concern Present (6/29/2024)    [employer] - Occupational Stress Questionnaire     Feeling of Stress : Only a little   Recent Concern: Stress - Stress Concern Present (4/30/2024)    [employer] - Occupational Stress Questionnaire     Feeling of Stress : To some extent   Housing Stability: Low Risk  (6/29/2024)    Housing Stability Vital Sign     Unable to Pay for Housing in the Last Year: No     Homeless in the Last Year: No     Family History   Problem Relation Name Age of Onset    Hodgkin's lymphoma Mother      Diabetes type II Mother      Kidney failure Father      Diabetes type I Father      Cancer Sister          Allergies  Review of patient's allergies indicates:   Allergen Reactions    Enbrel [etanercept] Shortness Of Breath     CHF    Nsaids (non-steroidal anti-inflammatory drug) Other (See Comments)     hypertention    Other reaction(s): Other (See  Comments)    HTN    Patient states he tolerates anti inflammatory eye drops    Statins-hmg-coa reductase inhibitors Other (See Comments)     Heart arrythemias    Other reaction(s): Other (See Comments)    Joint pain and cardiac arrythmias    Pcn [penicillins] Rash       Review of Systems   Review of Systems   Constitutional: Positive for malaise/fatigue. Negative for diaphoresis, weight gain and weight loss.   Eyes:  Negative for visual disturbance.   Cardiovascular:  Positive for leg swelling. Negative for chest pain, claudication, cyanosis, dyspnea on exertion, irregular heartbeat, near-syncope, orthopnea, palpitations, paroxysmal nocturnal dyspnea and syncope.   Respiratory:  Positive for cough. Negative for hemoptysis, shortness of breath, sleep disturbances due to breathing and wheezing.    Hematologic/Lymphatic: Negative for bleeding problem. Does not bruise/bleed easily.   Skin:  Negative for poor wound healing.   Musculoskeletal:  Negative for muscle cramps and myalgias.   Gastrointestinal:  Negative for abdominal pain, anorexia, diarrhea, heartburn, hematemesis, hematochezia, melena, nausea and vomiting.   Genitourinary:  Negative for hematuria and nocturia.   Neurological:  Negative for excessive daytime sleepiness, dizziness, focal weakness, light-headedness and weakness.       Physical Exam    Temp:  [97.8 °F (36.6 °C)-98.6 °F (37 °C)]   Pulse:  [106-115]   Resp:  [16-20]   BP: (101-115)/(61-75)   SpO2:  [92 %-97 %]    Wt Readings from Last 1 Encounters:   07/07/24 83.1 kg (183 lb 3.2 oz)     Physical Exam  Constitutional:       General: He is not in acute distress.     Appearance: He is obese. He is not toxic-appearing or diaphoretic.   HENT:      Head: Normocephalic and atraumatic.   Eyes:      General: No scleral icterus.     Conjunctiva/sclera: Conjunctivae normal.   Neck:      Thyroid: No thyromegaly.      Vascular: No carotid bruit, hepatojugular reflux or JVD.   Cardiovascular:      Rate and  Rhythm: Normal rate and regular rhythm. No extrasystoles are present.     Chest Wall: PMI is not displaced.      Pulses:           Carotid pulses are 2+ on the right side and 2+ on the left side.       Radial pulses are 2+ on the right side and 2+ on the left side.        Dorsalis pedis pulses are 2+ on the right side and 2+ on the left side.        Posterior tibial pulses are 2+ on the right side and 2+ on the left side.      Heart sounds: S1 normal and S2 normal. No murmur heard.     Gallop present. S4 sounds present. No S3 sounds.   Pulmonary:      Effort: No accessory muscle usage or respiratory distress.      Breath sounds: Rhonchi present. No decreased breath sounds, wheezing or rales.   Abdominal:      General: Bowel sounds are normal. There is no abdominal bruit.      Palpations: Abdomen is soft. There is no hepatomegaly, splenomegaly or pulsatile mass.      Tenderness: There is no abdominal tenderness.   Musculoskeletal:         General: No tenderness or deformity.      Right lower leg: Edema present.      Left lower leg: Edema present.   Skin:     General: Skin is warm.      Coloration: Skin is not pale.      Nails: There is no clubbing.   Neurological:      General: No focal deficit present.      Mental Status: He is alert and oriented to person, place, and time.      Sensory: Sensation is intact.      Motor: Motor function is intact.   Psychiatric:         Speech: Speech normal.         Behavior: Behavior normal. Behavior is cooperative.         Telemetry  Sinus rhythm    EKG  Sinus rhythm.  Left axis.  Low voltage.    Echocardiogram 4/3/2024    Left Ventricle: The left ventricle is normal in size. Increased wall thickness. There is concentric remodeling. There is normal systolic function with a visually estimated ejection fraction of 55 - 60%. Grade I diastolic dysfunction.    Right Ventricle: Normal right ventricular cavity size. Wall thickness is normal. Right ventricle wall motion  is normal.  Systolic function is normal.    Labs  Recent Results (from the past 72 hour(s))   POCT glucose    Collection Time: 07/09/24  4:34 PM   Result Value Ref Range    POCT Glucose 229 (H) 70 - 110 mg/dL   Comprehensive Metabolic Panel    Collection Time: 07/10/24  6:21 AM   Result Value Ref Range    Sodium 131 (L) 136 - 145 mmol/L    Potassium 4.8 3.5 - 5.1 mmol/L    Chloride 100 95 - 110 mmol/L    CO2 26 23 - 29 mmol/L    Glucose 183 (H) 70 - 110 mg/dL    BUN 9 8 - 23 mg/dL    Creatinine 0.6 0.5 - 1.4 mg/dL    Calcium 7.4 (L) 8.7 - 10.5 mg/dL    Total Protein 4.8 (L) 6.0 - 8.4 g/dL    Albumin 1.3 (L) 3.5 - 5.2 g/dL    Total Bilirubin 2.4 (H) 0.1 - 1.0 mg/dL    Alkaline Phosphatase 633 (H) 55 - 135 U/L    AST 73 (H) 10 - 40 U/L    ALT 41 10 - 44 U/L    eGFR >60 >60 mL/min/1.73 m^2    Anion Gap 5 (L) 8 - 16 mmol/L   Magnesium    Collection Time: 07/10/24  6:21 AM   Result Value Ref Range    Magnesium 1.8 1.6 - 2.6 mg/dL   Phosphorus    Collection Time: 07/10/24  6:21 AM   Result Value Ref Range    Phosphorus 2.8 2.7 - 4.5 mg/dL   CBC Auto Differential    Collection Time: 07/10/24  6:21 AM   Result Value Ref Range    WBC 6.46 3.90 - 12.70 K/uL    RBC 3.45 (L) 4.60 - 6.20 M/uL    Hemoglobin 10.4 (L) 14.0 - 18.0 g/dL    Hematocrit 31.6 (L) 40.0 - 54.0 %    MCV 92 82 - 98 fL    MCH 30.1 27.0 - 31.0 pg    MCHC 32.9 32.0 - 36.0 g/dL    RDW 16.2 (H) 11.5 - 14.5 %    Platelets 141 (L) 150 - 450 K/uL    MPV 10.7 9.2 - 12.9 fL    Immature Granulocytes 0.3 0.0 - 0.5 %    Gran # (ANC) 4.7 1.8 - 7.7 K/uL    Immature Grans (Abs) 0.02 0.00 - 0.04 K/uL    Lymph # 1.0 1.0 - 4.8 K/uL    Mono # 0.6 0.3 - 1.0 K/uL    Eos # 0.1 0.0 - 0.5 K/uL    Baso # 0.05 0.00 - 0.20 K/uL    nRBC 0 0 /100 WBC    Gran % 73.2 (H) 38.0 - 73.0 %    Lymph % 15.5 (L) 18.0 - 48.0 %    Mono % 9.1 4.0 - 15.0 %    Eosinophil % 1.1 0.0 - 8.0 %    Basophil % 0.8 0.0 - 1.9 %    Differential Method Automated    POCT glucose    Collection Time: 07/10/24  8:15 AM   Result  Value Ref Range    POCT Glucose 203 (H) 70 - 110 mg/dL   POCT glucose    Collection Time: 07/10/24 11:18 AM   Result Value Ref Range    POCT Glucose 211 (H) 70 - 110 mg/dL   POCT glucose    Collection Time: 07/10/24  3:23 PM   Result Value Ref Range    POCT Glucose 196 (H) 70 - 110 mg/dL   POCT glucose    Collection Time: 07/10/24  8:42 PM   Result Value Ref Range    POCT Glucose 244 (H) 70 - 110 mg/dL   Comprehensive Metabolic Panel    Collection Time: 07/11/24  5:29 AM   Result Value Ref Range    Sodium 130 (L) 136 - 145 mmol/L    Potassium 4.8 3.5 - 5.1 mmol/L    Chloride 99 95 - 110 mmol/L    CO2 25 23 - 29 mmol/L    Glucose 165 (H) 70 - 110 mg/dL    BUN 11 8 - 23 mg/dL    Creatinine 0.6 0.5 - 1.4 mg/dL    Calcium 7.7 (L) 8.7 - 10.5 mg/dL    Total Protein 5.5 (L) 6.0 - 8.4 g/dL    Albumin 1.4 (L) 3.5 - 5.2 g/dL    Total Bilirubin 2.3 (H) 0.1 - 1.0 mg/dL    Alkaline Phosphatase 709 (H) 55 - 135 U/L    AST 85 (H) 10 - 40 U/L    ALT 48 (H) 10 - 44 U/L    eGFR >60 >60 mL/min/1.73 m^2    Anion Gap 6 (L) 8 - 16 mmol/L   Magnesium    Collection Time: 07/11/24  5:29 AM   Result Value Ref Range    Magnesium 1.8 1.6 - 2.6 mg/dL   Phosphorus    Collection Time: 07/11/24  5:29 AM   Result Value Ref Range    Phosphorus 3.5 2.7 - 4.5 mg/dL   CBC Auto Differential    Collection Time: 07/11/24  5:29 AM   Result Value Ref Range    WBC 7.09 3.90 - 12.70 K/uL    RBC 3.59 (L) 4.60 - 6.20 M/uL    Hemoglobin 10.9 (L) 14.0 - 18.0 g/dL    Hematocrit 33.6 (L) 40.0 - 54.0 %    MCV 94 82 - 98 fL    MCH 30.4 27.0 - 31.0 pg    MCHC 32.4 32.0 - 36.0 g/dL    RDW 16.3 (H) 11.5 - 14.5 %    Platelets 175 150 - 450 K/uL    MPV 11.8 9.2 - 12.9 fL    Immature Granulocytes 0.7 (H) 0.0 - 0.5 %    Gran # (ANC) 4.9 1.8 - 7.7 K/uL    Immature Grans (Abs) 0.05 (H) 0.00 - 0.04 K/uL    Lymph # 1.2 1.0 - 4.8 K/uL    Mono # 0.8 0.3 - 1.0 K/uL    Eos # 0.1 0.0 - 0.5 K/uL    Baso # 0.06 0.00 - 0.20 K/uL    nRBC 0 0 /100 WBC    Gran % 68.9 38.0 - 73.0 %     Lymph % 16.8 (L) 18.0 - 48.0 %    Mono % 11.0 4.0 - 15.0 %    Eosinophil % 1.8 0.0 - 8.0 %    Basophil % 0.8 0.0 - 1.9 %    Differential Method Automated    POCT glucose    Collection Time: 07/11/24  7:49 AM   Result Value Ref Range    POCT Glucose 200 (H) 70 - 110 mg/dL   POCT glucose    Collection Time: 07/11/24 11:55 AM   Result Value Ref Range    POCT Glucose 246 (H) 70 - 110 mg/dL   POCT glucose    Collection Time: 07/11/24  3:38 PM   Result Value Ref Range    POCT Glucose 232 (H) 70 - 110 mg/dL   POCT glucose    Collection Time: 07/11/24 11:02 PM   Result Value Ref Range    POCT Glucose 223 (H) 70 - 110 mg/dL   POCT glucose    Collection Time: 07/12/24  4:08 AM   Result Value Ref Range    POCT Glucose 230 (H) 70 - 110 mg/dL   Comprehensive Metabolic Panel    Collection Time: 07/12/24  4:27 AM   Result Value Ref Range    Sodium 129 (L) 136 - 145 mmol/L    Potassium 4.7 3.5 - 5.1 mmol/L    Chloride 100 95 - 110 mmol/L    CO2 23 23 - 29 mmol/L    Glucose 184 (H) 70 - 110 mg/dL    BUN 11 8 - 23 mg/dL    Creatinine 0.6 0.5 - 1.4 mg/dL    Calcium 7.6 (L) 8.7 - 10.5 mg/dL    Total Protein 5.3 (L) 6.0 - 8.4 g/dL    Albumin 1.4 (L) 3.5 - 5.2 g/dL    Total Bilirubin 2.1 (H) 0.1 - 1.0 mg/dL    Alkaline Phosphatase 792 (H) 55 - 135 U/L    AST 91 (H) 10 - 40 U/L    ALT 50 (H) 10 - 44 U/L    eGFR >60 >60 mL/min/1.73 m^2    Anion Gap 6 (L) 8 - 16 mmol/L   Magnesium    Collection Time: 07/12/24  4:27 AM   Result Value Ref Range    Magnesium 1.8 1.6 - 2.6 mg/dL   Phosphorus    Collection Time: 07/12/24  4:27 AM   Result Value Ref Range    Phosphorus 3.6 2.7 - 4.5 mg/dL   CBC Auto Differential    Collection Time: 07/12/24  4:27 AM   Result Value Ref Range    WBC 5.56 3.90 - 12.70 K/uL    RBC 3.24 (L) 4.60 - 6.20 M/uL    Hemoglobin 10.1 (L) 14.0 - 18.0 g/dL    Hematocrit 30.9 (L) 40.0 - 54.0 %    MCV 95 82 - 98 fL    MCH 31.2 (H) 27.0 - 31.0 pg    MCHC 32.7 32.0 - 36.0 g/dL    RDW 16.6 (H) 11.5 - 14.5 %    Platelets 176 150 -  450 K/uL    MPV 11.7 9.2 - 12.9 fL    Immature Granulocytes 0.4 0.0 - 0.5 %    Gran # (ANC) 3.4 1.8 - 7.7 K/uL    Immature Grans (Abs) 0.02 0.00 - 0.04 K/uL    Lymph # 1.2 1.0 - 4.8 K/uL    Mono # 0.7 0.3 - 1.0 K/uL    Eos # 0.2 0.0 - 0.5 K/uL    Baso # 0.07 0.00 - 0.20 K/uL    nRBC 0 0 /100 WBC    Gran % 61.6 38.0 - 73.0 %    Lymph % 21.4 18.0 - 48.0 %    Mono % 12.2 4.0 - 15.0 %    Eosinophil % 3.1 0.0 - 8.0 %    Basophil % 1.3 0.0 - 1.9 %    Differential Method Automated    POCT glucose    Collection Time: 07/12/24 11:27 AM   Result Value Ref Range    POCT Glucose 217 (H) 70 - 110 mg/dL        Imaging  IR Paracentesis with Imaging    Result Date: 7/2/2024  EXAMINATION: Ultrasound-guided Paracentesis Procedural Personnel Attending physician(s): Julian Fellow physician(s): None Resident physician(s): None Advanced practice provider(s): None Pre-procedure diagnosis: Ascites Post-procedure diagnosis: Same Indication: Distension Complications: No immediate complications. PROCEDURAL SUMMARY: Ultrasound-guided paracentesis PROCEDURE: Pre-procedure: Consent: Informed consent for the procedure was obtained and time-out was performed prior to the procedure. Preparation: The site was prepared and draped using maximal sterile barrier technique including cutaneous antisepsis. Anesthesia/sedation: Level of anesthesia/sedation: No sedation Limited abdominal ultrasound: Limited abdominal ultrasound was performed. Small ascites. A safe window for paracentesis was identified. Paracentesis Local anesthesia was administered. The peritoneal cavity was accessed and fluid return confirmed position. Ascites was drained. Paracentesis access technique: Real-time ultrasound guidance Catheter placed: 6F [id number] Closure: The catheter was removed. A sterile bandage was applied. Post-drainage ultrasound: Not performed Additional Details: Additional description of procedure: None Equipment details: None Specimens removed: Abdominal  fluid Estimated blood loss (mL): Less than 10 Standardized report: [email or web address].     Ultrasound-guided paracentesis with drainage of 1250 mL of serous fluid. Plan: Sent for requested analysis Attestation: Signer name: Julian I attest that I was present for the entire procedure. I reviewed the stored images and agree with the report as written Electronically signed by: Ventura Jordan MD Date:    07/02/2024 Time:    13:32    Interventional Radiology    Result Date: 7/2/2024  Procedure performed in the Invasive Lab  - See Procedure Log link below for nursing documentation  - See OpNote on Surgeries Tab for physician findings  - See Imaging Tab for radiologist dictation    MRI MRCP Without Contrast    Result Date: 7/1/2024  EXAMINATION: MRI ABDOMEN WITHOUT CONTRAST MRCP CLINICAL HISTORY: abnormal LFTs, distended GB - r/o distal biliary obstruction; TECHNIQUE: Multiplanar, multisequence images are performed through the liver and upper abdomen.  Additionally 2D and 3D MRCP sequences are performed as well as MIP images. COMPARISON: Ultrasound 06/28/2024, CT 06/27/2024 FINDINGS: Bilateral small pleural effusion. No liver lesions.  Hepatic steatosis. The biliary ducts are not dilated.  The gallbladder is distended measuring 12.8 x 6.0 x 5.8 cm.  No filling defect to suggest cholelithiasis.  The common bile duct tapers normally.  The pancreatic duct is not dilated. The distal esophagus and stomach demonstrate nothing unusual. The spleen and bilateral adrenal glands appear normal. The bilateral kidneys appear within normal limits.  Right renal cyst 1.5 cm.  Subcentimeter left renal lesion possibly cysts. The abdominal aorta tapers normally. Loss of height of the T11, L2 and L3 vertebrae, to correlate with most recent CT examination.     Gallbladder distension, no definite obstructive process seen. Hepatic steatosis. Large amount of ascites. Bilateral small pleural effusion. Electronically signed by: Chantal  MD Rema Date:    07/01/2024 Time:    13:20    CT Abdomen Pelvis With IV Contrast NO Oral Contrast    Addendum Date: 6/29/2024    Steven Castañeda MD, first observed the critical findings on 06/27/2024 at 01:17, and sent the results to Tia Brandt MD, via Epic Secure Chat message on 06/27/2024 at 01:25.  Patient name and medical record number were specified/linked in the message. The recipient immediately responded, acknowledging receipt of the information. Electronically signed by: Steven Castañeda Date:    06/29/2024 Time:    19:54    Result Date: 6/29/2024  EXAMINATION: CT ABDOMEN PELVIS WITH IV CONTRAST CLINICAL HISTORY: Abdominal pain, acute, nonlocalized; TECHNIQUE: Low dose axial images, sagittal and coronal reformations were obtained from the lung bases to the pubic symphysis following the IV administration of 100 mL of Omnipaque 350. COMPARISON: CT abdomen pelvis with IV contrast 06/02/2024. CT images from the PET-CT scan of 12/03/2008 FINDINGS: ABDOMEN CT and PELVIS CT: Artifacts related to motion and/or beam hardening degrade numerous images. LOWER THORAX: There remains some hazy, bandlike, planar opacities in both lung bases that could represent a combination of scarring and subsegmental atelectasis.  Infectious, inflammatory, or neoplastic process is not excluded. The cardiopericardial silhouette demonstrates partially visualized coronary artery calcifications PA the lower mediastinum and distal thoracic esophagus some straight no acute CT abnormalities. LIVER: Profound diffuse hepatic parenchymal hypoattenuation, most compatible with significant diffuse hepatic steatosis no discrete hepatic masses are demonstrated.  Liver remains mildly enlarged, with right hepatic lobe craniocaudal dimension of approximately 19 cm.  Prominent caudate lobe. GALLBLADDER: Again appears distended, but without CT evidence of wall thickening or pericholecystic inflammatory changes. BILE DUCTS: No abnormal  dilatation.  No radiopaque choledocholithiasis. PANCREAS: Diffusely atrophic.  No acute CT abnormality.  No discrete mass apparent by CT. SPLEEN: Normal size.  Homogeneous enhancement. ADRENAL GLANDS: Nodular fullness of the adrenal gland apices bilaterally, similar to CT images extending least as far back as 12/03/2008. KIDNEYS: Prompt and symmetric cortical nephrograms bilaterally.  Nonobstructing bilateral renal calculi.  No hydroureteronephrosis.  No radiopaque calculi in the ureters or urinary bladder; urethra not fully visualized. Right renal hilar arterial calcification. No discrete renal soft tissue masses. STOMACH: Very poorly distended.  Mucosal hyperenhancement suspicious for gastritis. DUODENUM: Mucosal hyperenhancement suspicious for gastritis.  Trace periduodenal fluid/stranding. SMALL BOWEL: Mucosal hyperenhancement in the proximal jejunum.  No evidence of high-grade small bowel obstruction. LARGE BOWEL: Extensive mucosal thickening, involving almost the entirety of the colon, suspicious for colitis.  Occasional diverticula, no CT evidence of acute diverticulitis.  No CT evidence of high-grade colonic obstruction. APPENDIX: Normal. PERITONEAL CAVITY: No pneumoperitoneum.  Small quantity of water-attenuation intraperitoneal free fluid in the abdomen and pelvis. VASCULATURE: Scattered aortoiliac and branch vessel atherosclerotic changes.  No alicia aneurysmal enlargement of the abdominal aorta or iliac arteries.  No retroperitoneal hematoma or periaortic stranding. Normal course and caliber of the IVC.  Visualized portions of the portal venous system and hepatic veins appear grossly patent. LYMPH NODES: By size criteria, no abdominopelvic lymphadenopathy is demonstrated. URINARY BLADDER: Poorly distended.  No acute CT abnormality apparent. REPRODUCTIVE TRACT: Normal-sized prostate gland and seminal vesicles. BODY WALL: Visualized portion demonstrates no acute CT abnormality. MUSCULOSKELETAL: Scattered  degenerative/arthritic changes.  T11 and L3 vertebroplasty changes.  Pre-existing burst fracture deformity of the L1 vertebral body, similar to 06/02/2024.  Symphysis pubis, caudal portions of the osseous pelvis, and trochanteric portions of the proximal femurs, are not fully visualized.  Degenerative changes in the SI joints and hips.  IMAGES: No additional contributory findings.     ABDOMEN CT and PELVIS CT: Diffuse colitis, of numerous possible etiologies.  Correlate clinically. Gastroduodenitis, likely with some proximal jejunal enteritis. Small quantity of intraperitoneal free fluid.  No pneumoperitoneum. Bilateral nonobstructing intrarenal calculi. Profound diffuse hepatic steatosis.  Correlate clinically.  Consider outpatient referral to a hepatology/gastroenterology. Bibasilar pulmonary opacities similar to 06/02/2024, possibly representing subsegmental atelectasis and scarring.  Infectious, inflammatory, or neoplastic process is not excluded.  Correlate clinically, and with follow-up outpatient chest CT within 3-6 months. Other observations as detailed in the body of the report. This report was flagged in Epic as abnormal. Electronically signed by: Steven Castañeda Date:    06/27/2024 Time:    01:26    US Abdomen Limited    Result Date: 6/28/2024  EXAMINATION: US ABDOMEN LIMITED CLINICAL HISTORY: Unspecified abdominal pain TECHNIQUE: Limited ultrasound of the right upper quadrant of the abdomen was performed. COMPARISON: CT abdomen and pelvis from yesterday 06/27/2024. FINDINGS: Hepatic parenchyma is echogenic in appearance which may reflect diffuse fatty infiltration, noting appearance on yesterday's CT.  No intra-or extrahepatic biliary ductal dilatation. The common bile duct measures 0.6 cm.  The gallbladder is distended.  No evidence for cholelithiasis.  No evidence of gallbladder wall thickening or pericholecystic fluid.  Sonographic Nicole's sign is negative. The visualized portion of the pancreas  appears normal.  No ascites.     Distended gallbladder.  No gallstones or ultrasonographic evidence to suggest acute cholecystitis. See CT abdomen pelvis report from yesterday for full intra-abdominal details. Electronically signed by: Latanya Liz MD Date:    06/28/2024 Time:    18:42    X-Ray Chest AP Portable    Result Date: 6/28/2024  EXAMINATION: XR CHEST AP PORTABLE CLINICAL HISTORY: Sepsis; TECHNIQUE: Single frontal view of the chest was performed. COMPARISON: 06/27/2024 FINDINGS: Right basilar atelectasis with blunting of the right costophrenic angle.  Left lung is essentially clear.  Cardiac silhouette is unchanged.     Right basilar atelectasis.  There is blunting of the right costophrenic angle suggestive of a small effusion.  However, CT from 06/27/2024 did not reveal a significant effusion. Electronically signed by: Ventura Jordan MD Date:    06/28/2024 Time:    15:46    X-Ray Chest AP Portable    Result Date: 6/27/2024  EXAMINATION: XR CHEST AP PORTABLE CLINICAL HISTORY: Other fatigue TECHNIQUE: Single frontal view of the chest was performed. COMPARISON: CT abdomen pelvis from the same date.  Previous chest radiograph and CT chest from 04/28/2024. FINDINGS: Cardiac silhouette is stable in size.  Lungs are symmetrically expanded.  Chronic changes and scarring are seen in the right mid and lower lung zones similar to previous radiograph and CT.  No evidence of new focal consolidative process, pneumothorax, or large pleural effusion.  There is similar blunting of the right costophrenic angle with no significant pleural effusion seen on concurrent abdominal CT.  No acute osseous abnormality identified.     Stable chronic right-sided lung changes with otherwise no superimposed acute cardiopulmonary process identified. Electronically signed by: Latanya Liz MD Date:    06/27/2024 Time:    00:45      Assessment    Chronic diastolic heart failure  Volume status seems acceptable.    Plan and  Discussion    Could handle some cautious diuresis.  But given his admitting issues, would probably be better a little bit on the wet side than on the dry side.      Shemar Dempsey MD

## 2024-07-12 NOTE — PLAN OF CARE
Recommendations  1) Monitor PO intake and tolerance   2) Encourage intake of meals + commercial beverages as tolerated  3) Monitor BM regimen r/t constipation  4) RD to monitor and follow up    Goals:   1) Pt able to tolerate initiation and advancement of TPN by RD follow up (goal met)  2) Pt will have diet adv and able to tolerate 50-75% solid PO by RD follow up  Nutrition Goal Status: progressing towards goals  Communication of RD Recs:  (POC)

## 2024-07-12 NOTE — ASSESSMENT & PLAN NOTE
Abnormal LFTs  Hypokalemia  - Patient with intermittent nausea, vomiting and diarrhea.  Reports subjective fever.  CT abdomen with findings concerning for colitis.   - Completed 7 days of ciprofloxacin/metronidazole.  - Liver function tests with some improvement, cholestatic pattern initially.  - Patient with some clinical improvement in symptoms and stool studies negative for C.diff  - MRCP was negative for obstruction, noted gallbladder distention and large amount of ascites  - Case discussed with GI; paracentesis 07/02 demonstrating SAAG 1.1. NITIN, anti-mitochondrial, ASMA negative.  - Replete lytes as required. Monitor PO intake. Continue ondansetron, metoclopramide, continue PPN.  - EGD remarkable for Candida esophagitis and patient was started on fluconazole on 7/8  - Clinically improving with decreased pain and increased p.o. intake, will wean off PPN after today  - Appreciate GI assistance.

## 2024-07-12 NOTE — PLAN OF CARE
[facility] ([place])      HOME HEALTH ORDERS  FACE TO FACE ENCOUNTER    Patient Name: Nithin Wagner  YOB: 1955    PCP: Tushar Drew MD   PCP Address: [address] [place] [address]  PCP Phone Number: [phone]  PCP Fax: [phone]    Encounter Date: 7/14/2024    Admit to Home Health    Diagnoses:  Active Hospital Problems    Diagnosis  POA    *Colitis [K52.9]  Yes     Priority: 1 - High    Abnormal LFTs (liver function tests) [R79.89]  Yes    Severe protein-calorie malnutrition [E43]  Yes    SVT (supraventricular tachycardia) [I47.10]  Yes    Chronic diastolic heart failure [I50.32]  Yes     Grade I diastolic dysfunction.  EF 55-60% per most recent echo performed 04/03/2024.  Followed by Dr. Dempsey, cardiology    -        Type 2 diabetes mellitus, with long-term current use of insulin [E11.9, Z79.4]  Not Applicable    NAFLD (nonalcoholic fatty liver disease) [K76.0]  Yes    Hepatitis B core antibody positive [R76.8]  Yes      Resolved Hospital Problems   No resolved problems to display.       Follow Up Appointments:  Future Appointments   Date Time Provider Department Center   7/24/2024  2:00 PM [facility]   8/26/2024  1:40 PM Rhianan Wagner MD [facility] [place]   9/30/2024  3:00 PM Izabel Marc NP [facility] ENDOD[facility] [place]       Allergies:  Review of patient's allergies indicates:   Allergen Reactions    Enbrel [etanercept] Shortness Of Breath     CHF    Nsaids (non-steroidal anti-inflammatory drug) Other (See Comments)     hypertention    Other reaction(s): Other (See Comments)    HTN    Patient states he tolerates anti inflammatory eye drops    Statins-hmg-coa reductase inhibitors Other (See Comments)     Heart arrythemias    Other reaction(s): Other (See Comments)    Joint pain and cardiac arrythmias    Pcn [penicillins] Rash       Medications: Review discharge medications with patient and family and provide education.    Current  "Discharge Medication List        START taking these medications    Details   fluconazole (DIFLUCAN) 200 MG Tab Take 1 tablet (200 mg total) by mouth once daily. for 7 days  Qty: 7 tablet, Refills: 0           CONTINUE these medications which have NOT CHANGED    Details   diclofenac sodium (SOLARAZE) 3 % gel Apply to affected area three times a day.  Qty: 100 g, Refills: 5      digoxin (LANOXIN) 125 mcg tablet TAKE 1 TABLET BY MOUTH ONCE DAILY.  Qty: 90 tablet, Refills: 3      ergocalciferol (ERGOCALCIFEROL) 50,000 unit Cap Take 1 capsule (50,000 Units total) by mouth every 7 days.  Qty: 12 capsule, Refills: 3    Associated Diagnoses: Vitamin D deficiency      EScitalopram oxalate (LEXAPRO) 20 MG tablet Take 20 mg by mouth every evening.      gabapentin (NEURONTIN) 400 MG capsule Take 1 capsule (400 mg total) by mouth 3 (three) times daily.  Qty: 90 capsule, Refills: 11      lamiVUDine (EPIVIR) 150 MG Tab TAKE 1 TABLET BY MOUTH EVERY DAY  Qty: 30 tablet, Refills: 23    Associated Diagnoses: Need for post exposure prophylaxis for hepatitis B      multivitamin (THERAGRAN) per tablet Take 1 tablet by mouth once daily.      nitroGLYCERIN (NITROSTAT) 0.4 MG SL tablet PLACE 1 TABLET UNDER THE TONGUE EVERY 5 MINUTES AS NEEDED FOR CHEST PAIN.  Qty: 100 tablet, Refills: 2    Associated Diagnoses: Atherosclerosis of native coronary artery of native heart with stable angina pectoris      oxyCODONE (ROXICODONE) 10 mg Tab immediate release tablet Take 1 tablet (10 mg total) by mouth every 6 (six) hours as needed severe pain, medically necessary greater than 7 days  Qty: 30 tablet, Refills: 0    Comments: -      BD ULTRA-FINE RAHUL PEN NEEDLE 32 gauge x 5/32" Ndle To use 6 daily  Qty: 400 each, Refills: 3    Associated Diagnoses: Type 2 diabetes mellitus with hyperglycemia, with long-term current use of insulin      folic acid (FOLVITE) 1 MG tablet Take 1 tablet (1 mg total) by mouth once daily.  Qty: 30 tablet, Refills: 5    "   HYDROcodone-acetaminophen 7.5-300 mg Tab Take 1 tablet by mouth every 6 (six) hours as needed for pain.  Qty: 30 tablet, Refills: 0    Comments: -GREATER THAN 7 DAYS MEDICALLY NECESSARY      insulin pen,reusable,BT,aspart (INPEN, NOVOLOG OR FIASP, PINK) InPn To use with inpen cartridges  Qty: 1 each, Refills: 1    Associated Diagnoses: Type 2 diabetes mellitus with hyperglycemia, with long-term current use of insulin      ketoconazole (NIZORAL) 2 % cream Apply to affected area DAILY  Qty: 30 g, Refills: 3      Lactobacillus rhamnosus GG (CULTURELLE) 10 billion cell capsule Take 1 capsule by mouth once daily.  Qty: 30 capsule, Refills: 2      LIDOcaine (LIDODERM) 5 % Place 1 patch onto the skin once daily. Remove & Discard patch within 12 hours or as directed by MD  Qty: 30 patch, Refills: 2      naloxone (NARCAN) 4 mg/actuation Spry USE AS DIRECTED INTRANASAL FOR SUSPECTED DRUG OVERDOSE  Qty: 1 each, Refills: 1      nystatin (MYCOSTATIN) powder Apply topically 4 (four) times daily. Apply to intertriginous areas as directed up to four times daily to reduce moisture. for 14 days  Qty: 30 g, Refills: 0    Associated Diagnoses: Skin yeast infection      polyethylene glycol (GLYCOLAX) 17 gram/dose powder Use cap to measure 17 grams, mix in liquid and take by mouth once daily.  Qty: 510 g, Refills: 2      PROAIR HFA 90 mcg/actuation inhaler            STOP taking these medications       tirzepatide 7.5 mg/0.5 mL PnIj Comments:   Reason for Stopping:                 I have seen and examined this patient within the last 30 days. My clinical findings that support the need for the home health skilled services and home bound status are the following:no   Weakness/numbness causing balance and gait disturbance due to Weakness/Debility making it taxing to leave home.     Diet:   Cardiac and ADA 2000    Labs:  None    Referrals/ Consults  Physical Therapy to evaluate and treat. Evaluate for home safety and equipment needs;  Establish/upgrade home exercise program. Perform / instruct on therapeutic exercises, gait training, transfer training, and Range of Motion.  Occupational Therapy to evaluate and treat. Evaluate home environment for safety and equipment needs. Perform/Instruct on transfers, ADL training, ROM, and therapeutic exercises.   to evaluate for community resources/long-range planning.  Aide to provide assistance with personal care, ADLs, and vital signs.    Activities:   activity as tolerated    Nursing:   Agency to admit patient within 24 hours of hospital discharge unless specified on physician order or at patient request    SN to complete comprehensive assessment including routine vital signs. Instruct on disease process and s/s of complications to report to MD. Review/verify medication list sent home with the patient at time of discharge  and instruct patient/caregiver as needed. Frequency may be adjusted depending on start of care date.     Skilled nurse to perform up to 3 visits PRN for symptoms related to diagnosis    Notify MD if SBP > 160 or < 90; DBP > 90 or < 50; HR > 120 or < 50; Temp > 101; O2 < 88%.    Ok to schedule additional visits based on staff availability and patient request on consecutive days within the home health episode.    When multiple disciplines ordered:    Start of Care occurs on Sunday - Wednesday schedule remaining discipline evaluations as ordered on separate consecutive days following the start of care.    Thursday SOC -schedule subsequent evaluations Friday and Monday the following week.     Friday - Saturday SOC - schedule subsequent discipline evaluations on consecutive days starting Monday of the following week.    For all post-discharge communication and subsequent orders please contact patient's primary care physician.       I certify that this patient is confined to his home and needs intermittent skilled nursing care, physical therapy, and occupational  therapy.

## 2024-07-13 LAB
ALBUMIN SERPL BCP-MCNC: 1.5 G/DL (ref 3.5–5.2)
ALP SERPL-CCNC: 765 U/L (ref 55–135)
ALT SERPL W/O P-5'-P-CCNC: 54 U/L (ref 10–44)
ANION GAP SERPL CALC-SCNC: 8 MMOL/L (ref 8–16)
AST SERPL-CCNC: 90 U/L (ref 10–40)
BASOPHILS # BLD AUTO: 0.07 K/UL (ref 0–0.2)
BASOPHILS NFR BLD: 1.4 % (ref 0–1.9)
BILIRUB SERPL-MCNC: 2 MG/DL (ref 0.1–1)
BUN SERPL-MCNC: 11 MG/DL (ref 8–23)
CALCIUM SERPL-MCNC: 8 MG/DL (ref 8.7–10.5)
CHLORIDE SERPL-SCNC: 98 MMOL/L (ref 95–110)
CO2 SERPL-SCNC: 26 MMOL/L (ref 23–29)
CREAT SERPL-MCNC: 0.6 MG/DL (ref 0.5–1.4)
DIFFERENTIAL METHOD BLD: ABNORMAL
EOSINOPHIL # BLD AUTO: 0.1 K/UL (ref 0–0.5)
EOSINOPHIL NFR BLD: 2.6 % (ref 0–8)
ERYTHROCYTE [DISTWIDTH] IN BLOOD BY AUTOMATED COUNT: 16.3 % (ref 11.5–14.5)
EST. GFR  (NO RACE VARIABLE): >60 ML/MIN/1.73 M^2
GLUCOSE SERPL-MCNC: 172 MG/DL (ref 70–110)
HCT VFR BLD AUTO: 30.3 % (ref 40–54)
HGB BLD-MCNC: 9.8 G/DL (ref 14–18)
IMM GRANULOCYTES # BLD AUTO: 0.03 K/UL (ref 0–0.04)
IMM GRANULOCYTES NFR BLD AUTO: 0.6 % (ref 0–0.5)
INR PPP: 0.9 (ref 0.8–1.2)
LYMPHOCYTES # BLD AUTO: 1.2 K/UL (ref 1–4.8)
LYMPHOCYTES NFR BLD: 23.8 % (ref 18–48)
MAGNESIUM SERPL-MCNC: 1.8 MG/DL (ref 1.6–2.6)
MCH RBC QN AUTO: 30.5 PG (ref 27–31)
MCHC RBC AUTO-ENTMCNC: 32.3 G/DL (ref 32–36)
MCV RBC AUTO: 94 FL (ref 82–98)
MONOCYTES # BLD AUTO: 0.6 K/UL (ref 0.3–1)
MONOCYTES NFR BLD: 12.6 % (ref 4–15)
NEUTROPHILS # BLD AUTO: 3 K/UL (ref 1.8–7.7)
NEUTROPHILS NFR BLD: 59 % (ref 38–73)
NRBC BLD-RTO: 0 /100 WBC
PHOSPHATE SERPL-MCNC: 4 MG/DL (ref 2.7–4.5)
PLATELET # BLD AUTO: 228 K/UL (ref 150–450)
PMV BLD AUTO: 11.1 FL (ref 9.2–12.9)
POCT GLUCOSE: 141 MG/DL (ref 70–110)
POCT GLUCOSE: 152 MG/DL (ref 70–110)
POCT GLUCOSE: 162 MG/DL (ref 70–110)
POCT GLUCOSE: 170 MG/DL (ref 70–110)
POCT GLUCOSE: 211 MG/DL (ref 70–110)
POTASSIUM SERPL-SCNC: 4.5 MMOL/L (ref 3.5–5.1)
PREALB SERPL-MCNC: 7 MG/DL (ref 20–43)
PROT SERPL-MCNC: 5.3 G/DL (ref 6–8.4)
PROTHROMBIN TIME: 10.7 SEC (ref 9–12.5)
RBC # BLD AUTO: 3.21 M/UL (ref 4.6–6.2)
SODIUM SERPL-SCNC: 132 MMOL/L (ref 136–145)
TRIGL SERPL-MCNC: 206 MG/DL (ref 30–150)
WBC # BLD AUTO: 5 K/UL (ref 3.9–12.7)

## 2024-07-13 PROCEDURE — 85025 COMPLETE CBC W/AUTO DIFF WBC: CPT | Performed by: HOSPITALIST

## 2024-07-13 PROCEDURE — 85610 PROTHROMBIN TIME: CPT | Performed by: INTERNAL MEDICINE

## 2024-07-13 PROCEDURE — 63600175 PHARM REV CODE 636 W HCPCS: Performed by: HOSPITALIST

## 2024-07-13 PROCEDURE — 84478 ASSAY OF TRIGLYCERIDES: CPT | Performed by: INTERNAL MEDICINE

## 2024-07-13 PROCEDURE — 25000003 PHARM REV CODE 250: Performed by: HOSPITALIST

## 2024-07-13 PROCEDURE — 84134 ASSAY OF PREALBUMIN: CPT | Performed by: INTERNAL MEDICINE

## 2024-07-13 PROCEDURE — 97116 GAIT TRAINING THERAPY: CPT

## 2024-07-13 PROCEDURE — A4216 STERILE WATER/SALINE, 10 ML: HCPCS | Performed by: HOSPITALIST

## 2024-07-13 PROCEDURE — 25000003 PHARM REV CODE 250: Performed by: NURSE PRACTITIONER

## 2024-07-13 PROCEDURE — 25000003 PHARM REV CODE 250: Performed by: INTERNAL MEDICINE

## 2024-07-13 PROCEDURE — 99233 SBSQ HOSP IP/OBS HIGH 50: CPT | Mod: ,,, | Performed by: INTERNAL MEDICINE

## 2024-07-13 PROCEDURE — 63600175 PHARM REV CODE 636 W HCPCS: Performed by: INTERNAL MEDICINE

## 2024-07-13 PROCEDURE — 94761 N-INVAS EAR/PLS OXIMETRY MLT: CPT

## 2024-07-13 PROCEDURE — 97530 THERAPEUTIC ACTIVITIES: CPT

## 2024-07-13 PROCEDURE — 36415 COLL VENOUS BLD VENIPUNCTURE: CPT | Performed by: INTERNAL MEDICINE

## 2024-07-13 PROCEDURE — 63700000 PHARM REV CODE 250 ALT 637 W/O HCPCS: Performed by: INTERNAL MEDICINE

## 2024-07-13 PROCEDURE — 80053 COMPREHEN METABOLIC PANEL: CPT | Performed by: HOSPITALIST

## 2024-07-13 PROCEDURE — 83735 ASSAY OF MAGNESIUM: CPT | Performed by: HOSPITALIST

## 2024-07-13 PROCEDURE — 11000001 HC ACUTE MED/SURG PRIVATE ROOM

## 2024-07-13 PROCEDURE — 84100 ASSAY OF PHOSPHORUS: CPT | Performed by: HOSPITALIST

## 2024-07-13 RX ORDER — FUROSEMIDE 10 MG/ML
40 INJECTION INTRAMUSCULAR; INTRAVENOUS ONCE
Status: COMPLETED | OUTPATIENT
Start: 2024-07-13 | End: 2024-07-13

## 2024-07-13 RX ADMIN — GUAIFENESIN AND DEXTROMETHORPHAN 5 ML: 100; 10 SYRUP ORAL at 12:07

## 2024-07-13 RX ADMIN — SODIUM CHLORIDE, PRESERVATIVE FREE 10 ML: 5 INJECTION INTRAVENOUS at 05:07

## 2024-07-13 RX ADMIN — OXYCODONE AND ACETAMINOPHEN 1 TABLET: 10; 325 TABLET ORAL at 07:07

## 2024-07-13 RX ADMIN — INSULIN ASPART 2 UNITS: 100 INJECTION, SOLUTION INTRAVENOUS; SUBCUTANEOUS at 08:07

## 2024-07-13 RX ADMIN — LAMIVUDINE 150 MG: 150 TABLET, FILM COATED ORAL at 09:07

## 2024-07-13 RX ADMIN — ESCITALOPRAM OXALATE 20 MG: 10 TABLET ORAL at 09:07

## 2024-07-13 RX ADMIN — ONDANSETRON 4 MG: 2 INJECTION INTRAMUSCULAR; INTRAVENOUS at 11:07

## 2024-07-13 RX ADMIN — FLUCONAZOLE 200 MG: 100 TABLET ORAL at 08:07

## 2024-07-13 RX ADMIN — SODIUM CHLORIDE, PRESERVATIVE FREE 10 ML: 5 INJECTION INTRAVENOUS at 06:07

## 2024-07-13 RX ADMIN — OXYCODONE AND ACETAMINOPHEN 1 TABLET: 10; 325 TABLET ORAL at 11:07

## 2024-07-13 RX ADMIN — METOCLOPRAMIDE 10 MG: 10 TABLET ORAL at 06:07

## 2024-07-13 RX ADMIN — Medication 1 EACH: at 05:07

## 2024-07-13 RX ADMIN — GUAIFENESIN AND DEXTROMETHORPHAN 10 ML: 100; 10 SYRUP ORAL at 06:07

## 2024-07-13 RX ADMIN — ONDANSETRON 4 MG: 2 INJECTION INTRAMUSCULAR; INTRAVENOUS at 12:07

## 2024-07-13 RX ADMIN — FUROSEMIDE 40 MG: 10 INJECTION, SOLUTION INTRAMUSCULAR; INTRAVENOUS at 08:07

## 2024-07-13 RX ADMIN — METOCLOPRAMIDE 10 MG: 10 TABLET ORAL at 05:07

## 2024-07-13 RX ADMIN — ONDANSETRON 4 MG: 2 INJECTION INTRAMUSCULAR; INTRAVENOUS at 06:07

## 2024-07-13 RX ADMIN — PANTOPRAZOLE SODIUM 40 MG: 40 TABLET, DELAYED RELEASE ORAL at 08:07

## 2024-07-13 RX ADMIN — OXYCODONE AND ACETAMINOPHEN 1 TABLET: 10; 325 TABLET ORAL at 12:07

## 2024-07-13 RX ADMIN — METOCLOPRAMIDE 10 MG: 10 TABLET ORAL at 11:07

## 2024-07-13 RX ADMIN — OXYCODONE AND ACETAMINOPHEN 1 TABLET: 10; 325 TABLET ORAL at 03:07

## 2024-07-13 RX ADMIN — SODIUM CHLORIDE, PRESERVATIVE FREE 10 ML: 5 INJECTION INTRAVENOUS at 11:07

## 2024-07-13 RX ADMIN — ONDANSETRON 4 MG: 2 INJECTION INTRAMUSCULAR; INTRAVENOUS at 05:07

## 2024-07-13 RX ADMIN — DIGOXIN 0.12 MG: 125 TABLET ORAL at 08:07

## 2024-07-13 RX ADMIN — Medication 1 EACH: at 12:07

## 2024-07-13 RX ADMIN — Medication 1 EACH: at 08:07

## 2024-07-13 NOTE — PT/OT/SLP PROGRESS
Physical Therapy Treatment    Patient Name:  Nithin Wagner   MRN:  [id number]    Recommendations:     Discharge Recommendations: Low Intensity Therapy (will need inc assistance at home, pt to be speaking with his nephew about it)  Discharge Equipment Recommendations: none  Barriers to discharge: Decreased caregiver support    Assessment:     Nithin Wagner is a 68 y.o. male admitted with a medical diagnosis of Colitis.  He presents with the following impairments/functional limitations: weakness, decreased safety awareness, impaired endurance, impaired cardiopulmonary response to activity, impaired self care skills, impaired functional mobility, gait instability, decreased lower extremity function, impaired joint extensibility, decreased ROM, pain, impaired balance, edema     Patient requires encouragement to perform functional tasks such as sit to stands and bed mobility without physical assistance from therapist.   The patient ambulated 20 feet with RW and CGA. BP following ambulation: 109/70  mmhg    Rehab Prognosis: Good and Fair; patient would benefit from acute skilled PT services to address these deficits and reach maximum level of function.    Recent Surgery: Procedure(s) (LRB):  EGD (ESOPHAGOGASTRODUODENOSCOPY) (N/A) 5 Days Post-Op    Plan:     During this hospitalization, patient to be seen 5 x/week to address the identified rehab impairments via gait training, therapeutic activities, therapeutic exercises and progress toward the following goals:    Plan of Care Expires:  07/17/24    Subjective     Chief Complaint:tired, no complaints of pain or discomfort   Patient/Family Comments/goals: pt agreeable to session  Pain/Comfort:  Pain Rating 1: 0/10      Objective:     Communicated with nurse prior to session.  Patient found supine with peripheral IV upon PT entry to room.     General Precautions: Standard, fall  Orthopedic Precautions: N/A  Braces: N/A  Respiratory Status: Room air     Functional Mobility:  Bed  Mobility:     Supine to Sit: stand by assistance  Sit to Supine: stand by assistance  Transfers:     Sit to Stand:  contact guard assistance with rolling walker  Gait: amb'd 20' w/ RW and CGA/SBA, cueing for safety and occ help w/ RW for turns.       AM-PAC 6 CLICK MOBILITY  Turning over in bed (including adjusting bedclothes, sheets and blankets)?: 4  Sitting down on and standing up from a chair with arms (e.g., wheelchair, bedside commode, etc.): 3  Moving from lying on back to sitting on the side of the bed?: 3  Moving to and from a bed to a chair (including a wheelchair)?: 3  Need to walk in hospital room?: 3  Climbing 3-5 steps with a railing?: 3  Basic Mobility Total Score: 19       Treatment & Education:  Patient refuses to don Jurgen leon.     5 minutes spent standing EOB with no UE support while urinating.     Patient continuously asks for assistance throughout session and when therapist requests the patient perform functional activity on his own, he is able to perform task with no assistance (turning in bed, bed mobility, sit to stand)    5 sit to stands performed from EOB with RW and CGA     Patient performed supine <> sitting EOB 2x throughout session    Patient educated on the importance of sitting up and OOB activity    Patient left right sidelying with all lines intact and call button in reach..    GOALS:   Multidisciplinary Problems       Physical Therapy Goals          Problem: Physical Therapy    Goal Priority Disciplines Outcome Goal Variances Interventions   Physical Therapy Goal     PT, PT/OT Progressing     Description: Goals to be met by: 24    Patient will increase functional independence with mobility by performin. Sit<>stand with supervision with RW.  2. Gait x 50 feet with supervision with RW.  3. Ascend/descend 4 step(s) with least restrictive assistive device and uni HR with Shani.   4. Tolerate sitting up in chair x30 min with minimal increase in back pain.                        Time Tracking:     PT Received On: 07/13/24  PT Start Time: 1105     PT Stop Time: 1130  PT Total Time (min): 25 min     Billable Minutes: Gait Training 15 and Therapeutic Activity 10    Treatment Type: Evaluation  PT/PTA: PT     Number of PTA visits since last PT visit: 0     07/13/2024

## 2024-07-13 NOTE — NURSING
RAPID RESPONSE NURSE PROACTIVE ROUNDING NOTE     Time of Visit:     Admit Date: 2024  LOS: 13  Code Status: Full Code   Date of Visit: 2024  : 1955  Age: 68 y.o.  Sex: male  Race: White  Bed: B323/B3 A:   MRN: [id number]  Was the patient discharged from an ICU this admission? No   Was the patient discharged from a PACU within last 24 hours?  No  Did the patient receive conscious sedation/general anesthesia in last 24 hours?  No  Was the patient in the ED within the past 24 hours?  No  Was the patient started on NIPPV within the past 24 hours?  No  Attending Physician: Darby Obrien MD  Primary Service: Networked reference to record PCT     ASSESSMENT     Notified by Epic patient alert.  Reason for alert: MEWS score    Diagnosis: Colitis    Abnormal Vital Signs: /72 (BP Location: Left arm, Patient Position: Sitting)   Pulse (!) 111   Temp 97.4 °F (36.3 °C) (Oral)   Resp 18   Ht 6' (1.829 m)   Wt 83.1 kg (183 lb 3.2 oz)   SpO2 (!) 93%   BMI 24.85 kg/m²      Clinical Issues:  Gastrointestinal    Patient  has a past medical history of Arthritis, Atrial myxoma, Coronary atherosclerosis, Diabetes mellitus, type 2, Difficult intubation, Heart failure, Hepatitis B, HPV (human papilloma virus) infection, Hyperlipidemia, Hypertension, Non-alcoholic fatty liver disease, Rheumatoid arthritis, Rheumatoid arthritis flare, Squamous cell carcinoma of forehead, and Stroke.      Pt resting in bed. Midline nurse at bedside preparing to perform dressing change. Pt reports pain alleviated post pain medication administration. No complaints at this time.      INTERVENTIONS/ RECOMMENDATIONS     No interventions at this time.     Discussed plan of care with RN.    PHYSICIAN ESCALATION     Yes/No  No    Orders received and case discussed with NA.    Disposition: Remain in room 323.    FOLLOW-UP     Call back the Rapid Response Nurse, Alma Hickman RN at [facility] Phone:[phone] for additional questions or  concerns.

## 2024-07-13 NOTE — ASSESSMENT & PLAN NOTE
- Recent Echo performed 04/03/2024 showed EF 55-60%.  Patient states he no longer takes Lasix.  Lower extremity edema noted, patient denies shortness of breath  - Slight volume overload today in a setting with anasarca due to liver disease / hypoalbuminemia.  - Judicious IV Lasix 40 mg q.12 x2 doses and then will reassess  - Consult cardiology, patient known to Dr. Dempsey and patient request

## 2024-07-13 NOTE — SUBJECTIVE & OBJECTIVE
Interval History: No acute events overnight. Mild pain; but reports more SOB today. Still weak.    Review of Systems   Constitutional:  Negative for chills and fever.   Respiratory:  Positive for shortness of breath. Negative for cough.    Cardiovascular:  Negative for chest pain and palpitations.   Gastrointestinal:  Negative for abdominal pain, nausea and vomiting.   Neurological:  Positive for weakness.     Objective:     Vital Signs (Most Recent):  Temp: 97.4 °F (36.3 °C) (07/12/24 1945)  Pulse: (!) 111 (07/12/24 1945)  Resp: 18 (07/12/24 2023)  BP: 106/72 (07/12/24 1945)  SpO2: (!) 93 % (07/12/24 1945) Vital Signs (24h Range):  Temp:  [97.4 °F (36.3 °C)-99.6 °F (37.6 °C)] 97.4 °F (36.3 °C)  Pulse:  [106-115] 111  Resp:  [16-20] 18  SpO2:  [92 %-97 %] 93 %  BP: ()/(61-75) 106/72     Weight: 83.1 kg (183 lb 3.2 oz)  Body mass index is 24.85 kg/m².    Intake/Output Summary (Last 24 hours) at 7/12/2024 2033  Last data filed at 7/12/2024 1621  Gross per 24 hour   Intake --   Output 1600 ml   Net -1600 ml         Physical Exam  Vitals and nursing note reviewed.   Constitutional:       General: He is not in acute distress.     Appearance: He is well-developed.   HENT:      Head: Normocephalic and atraumatic.   Eyes:      General:         Right eye: No discharge.         Left eye: No discharge.      Conjunctiva/sclera: Conjunctivae normal.   Cardiovascular:      Rate and Rhythm: Normal rate.      Pulses: Normal pulses.   Pulmonary:      Effort: Pulmonary effort is normal. No respiratory distress.      Comments: Crackles noted bilaterally  Abdominal:      General: There is distension.      Palpations: Abdomen is soft.      Tenderness: There is abdominal tenderness.   Musculoskeletal:         General: Normal range of motion.      Right lower leg: Edema present.      Left lower leg: Edema present.   Skin:     General: Skin is warm and dry.   Neurological:      Mental Status: He is alert and oriented to person,  place, and time.           Significant Labs:   CBC:  Recent Labs   Lab 07/10/24  0621 07/11/24  0529 07/12/24  0427   WBC 6.46 7.09 5.56   HGB 10.4* 10.9* 10.1*   HCT 31.6* 33.6* 30.9*   * 175 176   GRAN 73.2*  4.7 68.9  4.9 61.6  3.4   LYMPH 15.5*  1.0 16.8*  1.2 21.4  1.2   MONO 9.1  0.6 11.0  0.8 12.2  0.7   EOS 0.1 0.1 0.2   BASO 0.05 0.06 0.07   CMP:  Recent Labs   Lab 07/10/24  0621 07/11/24  0529 07/12/24 0427   * 130* 129*   K 4.8 4.8 4.7    99 100   CO2 26 25 23   BUN 9 11 11   CREATININE 0.6 0.6 0.6   * 165* 184*   CALCIUM 7.4* 7.7* 7.6*   MG 1.8 1.8 1.8   PHOS 2.8 3.5 3.6   ALKPHOS 633* 709* 792*   AST 73* 85* 91*   ALT 41 48* 50*   BILITOT 2.4* 2.3* 2.1*   PROT 4.8* 5.5* 5.3*   ALBUMIN 1.3* 1.4* 1.4*   ANIONGAP 5* 6* 6*      Significant Imaging: I have reviewed and interpreted all pertinent imaging results/findings within the past 24 hours.

## 2024-07-13 NOTE — ASSESSMENT & PLAN NOTE
Abnormal LFTs  Hypokalemia  - Patient with intermittent nausea, vomiting and diarrhea.  Reports subjective fever.  CT abdomen with findings concerning for colitis.   - Completed 7 days of ciprofloxacin/metronidazole.  - Liver function tests with some improvement, cholestatic pattern initially.  - Patient with some clinical improvement in symptoms and stool studies negative for C.diff  - MRCP was negative for obstruction, noted gallbladder distention and large amount of ascites  - Case discussed with GI; paracentesis 07/02 demonstrating SAAG 1.1. NITIN, anti-mitochondrial, ASMA negative.  - Replete lytes as required. Monitor PO intake. Continue ondansetron, metoclopramide, continue PPN.  - EGD remarkable for Candida esophagitis and patient was started on fluconazole on 7/8  - Clinically improving with decreased pain and increased p.o. intake, weaned off PPN as of this morning  - Appreciate GI assistance  - Intake improving, will monitor off PPN and possible discharge tomorrow with home health  - Discussed possible contribution of withdrawal to patient's presentation along with poor appetite/nutrition, educated the patient that this will be not restarted on discharge

## 2024-07-13 NOTE — PROGRESS NOTES
[facility] Medicine  Progress Note    Patient Name: Nithin Wagner  MRN: [id number]  Patient Class: IP- Inpatient   Admission Date: 6/28/2024  Length of Stay: 13 days  Attending Physician: Darby Obrien MD  Primary Care Provider: Tushar Drew MD        Subjective:     Principal Problem:Colitis        HPI:  Nithin Wagner is a 68 y.o. male with a past medical history of hepatitis-B, hypertension, diastolic heart failure, diabetes, rheumatoid arthritis, hyponatremia, s/p s/p T11 and L3 kyphoplasty on 4/22/2024 who presents to the ED with lower abdominal pain recurrent onset 4 days ago.  Patient reports associated nausea and vomiting and states he has been experiencing intermittent diarrhea over the past several months.  He denies blood in stool.  Patient  was seen here on i 06/26/2024 for emesis and weakness. Workup showed findings suggestive of colitis and dehydration. He was offered admission at that time but he stated he would prefer to go home. Stated he went home and was unable to hold down significant fluids and came back.  Reports subjective fever a few days ago.  Had a normal bowel movement this morning     ED workup significant for unremarkable CBC, mild hyponatremia 132, T bili 4.1, albumin 1.6,  and .  Lactic acid 1.2.  CT abdomen performed 06/26/2024 showed diffuse colitis.  Patient was started on IV Cipro and Flagyl in the ED and referred to Hospital Medicine.  Patient will be admitted for further evaluation and management.    Overview/Hospital Course:  Mr. Wagner presented with abdominal pain with nausea and vomiting.  Placed in observation status.  Treatment initiated for colitis with IV Cipro and Flagyl, IV fluids, antiemetics and supportive care.  GI consulted. Underwent paracentesis. LFTs gradually improved though appetite remained poor. Completed course of antibiotic therapy. Autoimmune workup for liver disease unremarkable. Scheduled antiemetics with  improvement in symptoms though not in appetite. Started PPN. EGD performed 07/08 demonstrating white plaques in esophagus and gastritis; started fluconazole.    Interval History: No acute events overnight. Mild pain; but reports more SOB today. Still weak.    Review of Systems   Constitutional:  Negative for chills and fever.   Respiratory:  Positive for shortness of breath. Negative for cough.    Cardiovascular:  Negative for chest pain and palpitations.   Gastrointestinal:  Negative for abdominal pain, nausea and vomiting.   Neurological:  Positive for weakness.     Objective:     Vital Signs (Most Recent):  Temp: 97.4 °F (36.3 °C) (07/12/24 1945)  Pulse: (!) 111 (07/12/24 1945)  Resp: 18 (07/12/24 2023)  BP: 106/72 (07/12/24 1945)  SpO2: (!) 93 % (07/12/24 1945) Vital Signs (24h Range):  Temp:  [97.4 °F (36.3 °C)-99.6 °F (37.6 °C)] 97.4 °F (36.3 °C)  Pulse:  [106-115] 111  Resp:  [16-20] 18  SpO2:  [92 %-97 %] 93 %  BP: ()/(61-75) 106/72     Weight: 83.1 kg (183 lb 3.2 oz)  Body mass index is 24.85 kg/m².    Intake/Output Summary (Last 24 hours) at 7/12/2024 2033  Last data filed at 7/12/2024 1621  Gross per 24 hour   Intake --   Output 1600 ml   Net -1600 ml         Physical Exam  Vitals and nursing note reviewed.   Constitutional:       General: He is not in acute distress.     Appearance: He is well-developed.   HENT:      Head: Normocephalic and atraumatic.   Eyes:      General:         Right eye: No discharge.         Left eye: No discharge.      Conjunctiva/sclera: Conjunctivae normal.   Cardiovascular:      Rate and Rhythm: Normal rate.      Pulses: Normal pulses.   Pulmonary:      Effort: Pulmonary effort is normal. No respiratory distress.      Comments: Crackles noted bilaterally  Abdominal:      General: There is distension.      Palpations: Abdomen is soft.      Tenderness: There is abdominal tenderness.   Musculoskeletal:         General: Normal range of motion.      Right lower leg: Edema  present.      Left lower leg: Edema present.   Skin:     General: Skin is warm and dry.   Neurological:      Mental Status: He is alert and oriented to person, place, and time.           Significant Labs:   CBC:  Recent Labs   Lab 07/10/24  0621 07/11/24  0529 07/12/24  0427   WBC 6.46 7.09 5.56   HGB 10.4* 10.9* 10.1*   HCT 31.6* 33.6* 30.9*   * 175 176   GRAN 73.2*  4.7 68.9  4.9 61.6  3.4   LYMPH 15.5*  1.0 16.8*  1.2 21.4  1.2   MONO 9.1  0.6 11.0  0.8 12.2  0.7   EOS 0.1 0.1 0.2   BASO 0.05 0.06 0.07   CMP:  Recent Labs   Lab 07/10/24  0621 07/11/24  0529 07/12/24  0427   * 130* 129*   K 4.8 4.8 4.7    99 100   CO2 26 25 23   BUN 9 11 11   CREATININE 0.6 0.6 0.6   * 165* 184*   CALCIUM 7.4* 7.7* 7.6*   MG 1.8 1.8 1.8   PHOS 2.8 3.5 3.6   ALKPHOS 633* 709* 792*   AST 73* 85* 91*   ALT 41 48* 50*   BILITOT 2.4* 2.3* 2.1*   PROT 4.8* 5.5* 5.3*   ALBUMIN 1.3* 1.4* 1.4*   ANIONGAP 5* 6* 6*      Significant Imaging: I have reviewed and interpreted all pertinent imaging results/findings within the past 24 hours.    Assessment/Plan:      * Colitis  Abnormal LFTs  Hypokalemia  - Patient with intermittent nausea, vomiting and diarrhea.  Reports subjective fever.  CT abdomen with findings concerning for colitis.   - Completed 7 days of ciprofloxacin/metronidazole.  - Liver function tests with some improvement, cholestatic pattern initially.  - Patient with some clinical improvement in symptoms and stool studies negative for C.diff  - MRCP was negative for obstruction, noted gallbladder distention and large amount of ascites  - Case discussed with GI; paracentesis 07/02 demonstrating SAAG 1.1. NITIN, anti-mitochondrial, ASMA negative.  - Replete lytes as required. Monitor PO intake. Continue ondansetron, metoclopramide, continue PPN.  - EGD remarkable for Candida esophagitis and patient was started on fluconazole on 7/8  - Clinically improving with decreased pain and increased p.o. intake,  will wean off PPN after today  - Appreciate GI assistance.    SVT (supraventricular tachycardia)  - History SVT noted.  Followed by Cardiology, Dr. Doshi, outpatient.  States he takes digoxin and has had no recent episodes of SVT  - Continue digoxin 0.125 mg p.o. daily  - Monitor on telemetry    Type 2 diabetes mellitus, with long-term current use of insulin  - Recent HgbA1c 7.4 performed on 3/2024.  Patient has not been taking insulin and was on tirzepeptide and stopped taking in April  - Glucose level reviewed, in normal to slightly elevated range  - Continue monitor with Accu-Cheks q.a.c. and q.h.s. and give low-dose sliding scale insulin as necessary    Chronic diastolic heart failure  - Recent Echo performed 04/03/2024 showed EF 55-60%.  Patient states he no longer takes Lasix.  Lower extremity edema noted, patient denies shortness of breath  - Slight volume overload today in a setting with anasarca due to liver disease / hypoalbuminemia.  - Judicious IV Lasix 40 mg q.12 x2 doses and then will reassess  - Consult cardiology, patient known to Dr. Dempsey and patient request    NAFLD (nonalcoholic fatty liver disease)  - History noted.  LFTs on admission elevated from baseline with , T bili 4.1,  and .  - As discussed above    Hepatitis B core antibody positive  - Patient states he was exposed to hep B while working in [place] in the 1990s and is on prophylaxis with lamivudine   - Continue lamivudine daily      VTE Risk Mitigation (From admission, onward)           Ordered     Place CHELE hose  Until discontinued         07/03/24 2056     IP VTE HIGH RISK PATIENT  Once         06/28/24 2107     Place sequential compression device  Until discontinued         06/28/24 2107                        Darby Obrien MD  Department of Hospital Medicine   [facility] ([place])

## 2024-07-13 NOTE — SUBJECTIVE & OBJECTIVE
Interval History: No acute events overnight. Mild pain; but reports SOB much better today. Still weak but improving.\    Review of Systems   Constitutional:  Negative for chills and fever.   Respiratory:  Positive for shortness of breath. Negative for cough.    Cardiovascular:  Negative for chest pain and palpitations.   Gastrointestinal:  Negative for abdominal pain, nausea and vomiting.   Neurological:  Positive for weakness.     Objective:     Vital Signs (Most Recent):  Temp: 99.2 °F (37.3 °C) (07/13/24 0804)  Pulse: 108 (07/13/24 0804)  Resp: 18 (07/13/24 0804)  BP: 102/65 (07/13/24 0804)  SpO2: (!) 91 % (07/13/24 0804) Vital Signs (24h Range):  Temp:  [97.4 °F (36.3 °C)-99.6 °F (37.6 °C)] 99.2 °F (37.3 °C)  Pulse:  [104-111] 108  Resp:  [16-20] 18  SpO2:  [91 %-94 %] 91 %  BP: ()/(60-72) 102/65     Weight: 83.1 kg (183 lb 3.2 oz)  Body mass index is 24.85 kg/m².    Intake/Output Summary (Last 24 hours) at 7/13/2024 0840  Last data filed at 7/13/2024 0620  Gross per 24 hour   Intake 325 ml   Output 1775 ml   Net -1450 ml         Physical Exam  Vitals and nursing note reviewed.   Constitutional:       General: He is not in acute distress.     Appearance: He is well-developed.   HENT:      Head: Normocephalic and atraumatic.   Eyes:      General:         Right eye: No discharge.         Left eye: No discharge.      Conjunctiva/sclera: Conjunctivae normal.   Cardiovascular:      Rate and Rhythm: Normal rate.      Pulses: Normal pulses.   Pulmonary:      Effort: Pulmonary effort is normal. No respiratory distress.      Comments: Crackles noted bilaterally, improved and only at bases  Abdominal:      General: There is distension.      Palpations: Abdomen is soft.      Tenderness: There is abdominal tenderness.   Musculoskeletal:         General: Normal range of motion.      Right lower leg: Edema present.      Left lower leg: Edema present.   Skin:     General: Skin is warm and dry.   Neurological:      Mental  Status: He is alert and oriented to person, place, and time.           Significant Labs:   CBC:  Recent Labs   Lab 07/11/24  0529 07/12/24 0427 07/13/24 0524   WBC 7.09 5.56 5.00   HGB 10.9* 10.1* 9.8*   HCT 33.6* 30.9* 30.3*    176 228   GRAN 68.9  4.9 61.6  3.4 59.0  3.0   LYMPH 16.8*  1.2 21.4  1.2 23.8  1.2   MONO 11.0  0.8 12.2  0.7 12.6  0.6   EOS 0.1 0.2 0.1   BASO 0.06 0.07 0.07   CMP:  Recent Labs   Lab 07/11/24 0529 07/12/24 0427 07/13/24 0524   * 129* 132*   K 4.8 4.7 4.5   CL 99 100 98   CO2 25 23 26   BUN 11 11 11   CREATININE 0.6 0.6 0.6   * 184* 172*   CALCIUM 7.7* 7.6* 8.0*   MG 1.8 1.8 1.8   PHOS 3.5 3.6 4.0   ALKPHOS 709* 792* 765*   AST 85* 91* 90*   ALT 48* 50* 54*   BILITOT 2.3* 2.1* 2.0*   PROT 5.5* 5.3* 5.3*   ALBUMIN 1.4* 1.4* 1.5*   ANIONGAP 6* 6* 8      Significant Imaging: I have reviewed and interpreted all pertinent imaging results/findings within the past 24 hours.

## 2024-07-13 NOTE — ASSESSMENT & PLAN NOTE
- Recent Echo performed 04/03/2024 showed EF 55-60%.  Patient states he no longer takes Lasix.  Lower extremity edema noted, patient denies shortness of breath  - Slight volume overload today in a setting with anasarca due to liver disease / hypoalbuminemia.  - Judicious IV Lasix 40 mg x 1 yesterday and with improvement noted, 1 more dose today  - Consulted cardiology, patient known to Dr. Dempsey, appreciate input

## 2024-07-13 NOTE — PLAN OF CARE
Pt remained free of falls and injuries throughout shift. AAOx4. Purposeful hourly rounding performed. Pt swallows meds whole. Pt received IV Clinimix per MAR, order D/C'd this AM, midline and IV flushed and saline locked. Managed c/o lower back and BLE pain with PRN Percocet. Patient ambulates with X1 assist using walker. BLE edema +3, pt educated on importance of elevating with pillows and minimizing dangling legs when sitting, needs reinforcement. VSS on RA. Pt resting comfortably in bed, denies needs at this time, NADN. Bed low and locked, bed alarm on, call light in reach. Side rails up x2. Report given to BE Bassett.

## 2024-07-13 NOTE — PROGRESS NOTES
[facility]    Admission date:  6/28/2024     Assessment    Chronic diastolic heart failure   Still mildly volume overloaded.    Plan and Discussion    Tolerating continued, cautious diuresis.    Subjective    Up working with therapy.  Some orthostasis the past quickly.    Medications  Current Facility-Administered Medications   Medication Dose Route Frequency Provider Last Rate Last Admin    acetaminophen tablet 650 mg  650 mg Oral Q8H PRN Valeria Patel NP   650 mg at 07/09/24 1627    dextromethorphan-guaiFENesin  mg/5 ml liquid 10 mL  10 mL Oral Q4H PRN CARISA Lozano MD   10 mL at 07/13/24 0615    dextrose 10% bolus 125 mL 125 mL  12.5 g Intravenous PRN Valeria Patel, NP        dextrose 10% bolus 250 mL 250 mL  25 g Intravenous PRN Valeria Patel, BASIA        digoxin tablet 0.125 mg  0.125 mg Oral Daily Valeria Patel, NP   0.125 mg at 07/13/24 0840    EScitalopram oxalate tablet 20 mg  20 mg Oral QHS Valeria Patel, NP   20 mg at 07/12/24 2156    fluconazole tablet 200 mg  200 mg Oral Daily CARISA Lozano MD   200 mg at 07/13/24 0840    glucagon (human recombinant) injection 1 mg  1 mg Intramuscular PRN Valeria Patel NP        glucagon (human recombinant) injection 1 mg  1 mg Intramuscular PRN Chilo Urban MD        glucose chewable tablet 16 g  16 g Oral PRN Valeria Patel NP   16 g at 07/05/24 0012    glucose chewable tablet 24 g  24 g Oral PRN Valeria Patel NP        hydrOXYzine HCL tablet 50 mg  50 mg Oral QID PRN CARISA Lozano MD   50 mg at 07/09/24 2143    insulin aspart U-100 pen 0-5 Units  0-5 Units Subcutaneous QID (AC + HS) PRN Valeria Patel NP   2 Units at 07/13/24 0845    lactobacillus acidophilus & bulgar 100 million cell packet 1 each  1 packet Oral TID  CARISA Lozano MD   1 each at 07/13/24 0840    lamiVUDine tablet 150 mg  150 mg Oral QHS Valeria Patel, NP   150 mg at 07/12/24 0700    LIDOcaine 5 % patch 1  patch  1 patch Transdermal Q24H Valeria Patel NP   1 patch at 24    loperamide 1 mg/7.5 mL solution 1 mg  1 mg Oral QID PRN Janet Win MD        melatonin tablet 6 mg  6 mg Oral Nightly PRN Valeria Patel NP   6 mg at 24 2354    metoclopramide HCl tablet 10 mg  10 mg Oral TID AC CARISA Lozano MD   10 mg at 24 1133    naloxone 0.4 mg/mL injection 0.02 mg  0.02 mg Intravenous PRN Valeria Patel NP        ondansetron injection 4 mg  4 mg Intravenous Q6H CARISA Lozano MD   4 mg at 24 1133    oxyCODONE-acetaminophen  mg per tablet 1 tablet  1 tablet Oral Q4H PRN CARISA Lozano MD   1 tablet at 24 1139    pantoprazole EC tablet 40 mg  40 mg Oral Daily Darby Obrien MD   40 mg at 24 0840    prochlorperazine injection Soln 5 mg  5 mg Intravenous Q6H PRN CARISA Lozano MD   5 mg at 24 0241    sodium chloride 0.9% flush 10 mL  10 mL Intravenous Q6H Darby Obrien MD   10 mL at 24 1133    And    sodium chloride 0.9% flush 10 mL  10 mL Intravenous PRN Darby Obrien MD   10 mL at 24 1149        Physical Exam    Temp:  [97.4 °F (36.3 °C)-99.6 °F (37.6 °C)]   Pulse:  [104-118]   Resp:  [16-20]   BP: ()/(60-72)   SpO2:  [91 %-95 %]    Wt Readings from Last 3 Encounters:   24 83.1 kg (183 lb 3.2 oz)   24 99.8 kg (220 lb)   24 99.8 kg (220 lb)     Physical Exam  Constitutional:       General: He is not in acute distress.  Neck:      Vascular: No hepatojugular reflux or JVD.   Cardiovascular:      Rate and Rhythm: Normal rate and regular rhythm.      Heart sounds: S1 normal and S2 normal.   Pulmonary:      Effort: Pulmonary effort is normal.      Breath sounds: Normal air entry. Rales present.   Abdominal:      General: Bowel sounds are normal.      Palpations: Abdomen is soft. There is no hepatomegaly.      Tenderness: There is no abdominal tenderness.   Musculoskeletal:      Right lower le+ Edema (pedal)  present.      Left lower le+ Edema (pedal) present.   Skin:     Coloration: Skin is not pale.   Neurological:      Mental Status: He is alert.   Psychiatric:         Behavior: Behavior is cooperative.           Labs  Recent Results (from the past 24 hour(s))   POCT glucose    Collection Time: 24  3:42 PM   Result Value Ref Range    POCT Glucose 150 (H) 70 - 110 mg/dL   Comprehensive Metabolic Panel    Collection Time: 24  5:24 AM   Result Value Ref Range    Sodium 132 (L) 136 - 145 mmol/L    Potassium 4.5 3.5 - 5.1 mmol/L    Chloride 98 95 - 110 mmol/L    CO2 26 23 - 29 mmol/L    Glucose 172 (H) 70 - 110 mg/dL    BUN 11 8 - 23 mg/dL    Creatinine 0.6 0.5 - 1.4 mg/dL    Calcium 8.0 (L) 8.7 - 10.5 mg/dL    Total Protein 5.3 (L) 6.0 - 8.4 g/dL    Albumin 1.5 (L) 3.5 - 5.2 g/dL    Total Bilirubin 2.0 (H) 0.1 - 1.0 mg/dL    Alkaline Phosphatase 765 (H) 55 - 135 U/L    AST 90 (H) 10 - 40 U/L    ALT 54 (H) 10 - 44 U/L    eGFR >60 >60 mL/min/1.73 m^2    Anion Gap 8 8 - 16 mmol/L   Magnesium    Collection Time: 24  5:24 AM   Result Value Ref Range    Magnesium 1.8 1.6 - 2.6 mg/dL   Phosphorus    Collection Time: 24  5:24 AM   Result Value Ref Range    Phosphorus 4.0 2.7 - 4.5 mg/dL   CBC Auto Differential    Collection Time: 24  5:24 AM   Result Value Ref Range    WBC 5.00 3.90 - 12.70 K/uL    RBC 3.21 (L) 4.60 - 6.20 M/uL    Hemoglobin 9.8 (L) 14.0 - 18.0 g/dL    Hematocrit 30.3 (L) 40.0 - 54.0 %    MCV 94 82 - 98 fL    MCH 30.5 27.0 - 31.0 pg    MCHC 32.3 32.0 - 36.0 g/dL    RDW 16.3 (H) 11.5 - 14.5 %    Platelets 228 150 - 450 K/uL    MPV 11.1 9.2 - 12.9 fL    Immature Granulocytes 0.6 (H) 0.0 - 0.5 %    Gran # (ANC) 3.0 1.8 - 7.7 K/uL    Immature Grans (Abs) 0.03 0.00 - 0.04 K/uL    Lymph # 1.2 1.0 - 4.8 K/uL    Mono # 0.6 0.3 - 1.0 K/uL    Eos # 0.1 0.0 - 0.5 K/uL    Baso # 0.07 0.00 - 0.20 K/uL    nRBC 0 0 /100 WBC    Gran % 59.0 38.0 - 73.0 %    Lymph % 23.8 18.0 - 48.0 %    Mono %  12.6 4.0 - 15.0 %    Eosinophil % 2.6 0.0 - 8.0 %    Basophil % 1.4 0.0 - 1.9 %    Differential Method Automated    Triglycerides    Collection Time: 07/13/24  5:24 AM   Result Value Ref Range    Triglycerides 206 (H) 30 - 150 mg/dL   Protime-INR    Collection Time: 07/13/24  5:24 AM   Result Value Ref Range    Prothrombin Time 10.7 9.0 - 12.5 sec    INR 0.9 0.8 - 1.2   POCT glucose    Collection Time: 07/13/24  8:06 AM   Result Value Ref Range    POCT Glucose 211 (H) 70 - 110 mg/dL   POCT glucose    Collection Time: 07/13/24 11:44 AM   Result Value Ref Range    POCT Glucose 152 (H) 70 - 110 mg/dL             Shemar Dempsey MD

## 2024-07-13 NOTE — NURSING
"Line assessment requested by nursing stating that midline will not flush and the pump constantly beeps.  Dressing clean, dry, and peeling up at edges.  No blood return.  Flushes with ease.  Arm circumference is 29 cm which is equivalent to the measurement at the time of insertion. Pt states he can "taste the flush," he denies any pain, and there is no leaking present.  Small amount of old blood seen in extension tubing.    Sterile dressing change provided.  CHG disk present.  Extension tubing changed.  No blood return.  Flushes with ease.  Midline currently infusing PPN.    There is a functioning #20 G PIV distal to his midline.  Should midline fail this IV can be used to infuse his PPN.  If both fail and PIV access cannot be obtained another midline order will need to be obtained.    This is the patients 3rd midline.  Recommend nursing flush midline using push-pause method every shift.  "

## 2024-07-13 NOTE — PROGRESS NOTES
[facility] Medicine  Progress Note    Patient Name: Nithin Wagner  MRN: [id number]  Patient Class: IP- Inpatient   Admission Date: 6/28/2024  Length of Stay: 14 days  Attending Physician: Darby Obrien MD  Primary Care Provider: Tushar Drew MD        Subjective:     Principal Problem:Colitis        HPI:  Nithin Wagner is a 68 y.o. male with a past medical history of hepatitis-B, hypertension, diastolic heart failure, diabetes, rheumatoid arthritis, hyponatremia, s/p s/p T11 and L3 kyphoplasty on 4/22/2024 who presents to the ED with lower abdominal pain recurrent onset 4 days ago.  Patient reports associated nausea and vomiting and states he has been experiencing intermittent diarrhea over the past several months.  He denies blood in stool.  Patient  was seen here on i 06/26/2024 for emesis and weakness. Workup showed findings suggestive of colitis and dehydration. He was offered admission at that time but he stated he would prefer to go home. Stated he went home and was unable to hold down significant fluids and came back.  Reports subjective fever a few days ago.  Had a normal bowel movement this morning     ED workup significant for unremarkable CBC, mild hyponatremia 132, T bili 4.1, albumin 1.6,  and .  Lactic acid 1.2.  CT abdomen performed 06/26/2024 showed diffuse colitis.  Patient was started on IV Cipro and Flagyl in the ED and referred to Hospital Medicine.  Patient will be admitted for further evaluation and management.    Overview/Hospital Course:  Mr. Wagner presented with abdominal pain with nausea and vomiting.  Placed in observation status.  Treatment initiated for colitis with IV Cipro and Flagyl, IV fluids, antiemetics and supportive care.  GI consulted. Underwent paracentesis. LFTs gradually improved though appetite remained poor. Completed course of antibiotic therapy. Autoimmune workup for liver disease unremarkable. Scheduled antiemetics with  improvement in symptoms though not in appetite. Started PPN. EGD performed 07/08 demonstrating white plaques in esophagus and gastritis; started fluconazole.    Interval History: No acute events overnight. Mild pain; but reports SOB much better today. Still weak but improving.\    Review of Systems   Constitutional:  Negative for chills and fever.   Respiratory:  Positive for shortness of breath. Negative for cough.    Cardiovascular:  Negative for chest pain and palpitations.   Gastrointestinal:  Negative for abdominal pain, nausea and vomiting.   Neurological:  Positive for weakness.     Objective:     Vital Signs (Most Recent):  Temp: 99.2 °F (37.3 °C) (07/13/24 0804)  Pulse: 108 (07/13/24 0804)  Resp: 18 (07/13/24 0804)  BP: 102/65 (07/13/24 0804)  SpO2: (!) 91 % (07/13/24 0804) Vital Signs (24h Range):  Temp:  [97.4 °F (36.3 °C)-99.6 °F (37.6 °C)] 99.2 °F (37.3 °C)  Pulse:  [104-111] 108  Resp:  [16-20] 18  SpO2:  [91 %-94 %] 91 %  BP: ()/(60-72) 102/65     Weight: 83.1 kg (183 lb 3.2 oz)  Body mass index is 24.85 kg/m².    Intake/Output Summary (Last 24 hours) at 7/13/2024 0840  Last data filed at 7/13/2024 0620  Gross per 24 hour   Intake 325 ml   Output 1775 ml   Net -1450 ml         Physical Exam  Vitals and nursing note reviewed.   Constitutional:       General: He is not in acute distress.     Appearance: He is well-developed.   HENT:      Head: Normocephalic and atraumatic.   Eyes:      General:         Right eye: No discharge.         Left eye: No discharge.      Conjunctiva/sclera: Conjunctivae normal.   Cardiovascular:      Rate and Rhythm: Normal rate.      Pulses: Normal pulses.   Pulmonary:      Effort: Pulmonary effort is normal. No respiratory distress.      Comments: Crackles noted bilaterally, improved and only at bases  Abdominal:      General: There is distension.      Palpations: Abdomen is soft.      Tenderness: There is abdominal tenderness.   Musculoskeletal:         General: Normal  range of motion.      Right lower leg: Edema present.      Left lower leg: Edema present.   Skin:     General: Skin is warm and dry.   Neurological:      Mental Status: He is alert and oriented to person, place, and time.           Significant Labs:   CBC:  Recent Labs   Lab 07/11/24  0529 07/12/24 0427 07/13/24 0524   WBC 7.09 5.56 5.00   HGB 10.9* 10.1* 9.8*   HCT 33.6* 30.9* 30.3*    176 228   GRAN 68.9  4.9 61.6  3.4 59.0  3.0   LYMPH 16.8*  1.2 21.4  1.2 23.8  1.2   MONO 11.0  0.8 12.2  0.7 12.6  0.6   EOS 0.1 0.2 0.1   BASO 0.06 0.07 0.07   CMP:  Recent Labs   Lab 07/11/24 0529 07/12/24 0427 07/13/24 0524   * 129* 132*   K 4.8 4.7 4.5   CL 99 100 98   CO2 25 23 26   BUN 11 11 11   CREATININE 0.6 0.6 0.6   * 184* 172*   CALCIUM 7.7* 7.6* 8.0*   MG 1.8 1.8 1.8   PHOS 3.5 3.6 4.0   ALKPHOS 709* 792* 765*   AST 85* 91* 90*   ALT 48* 50* 54*   BILITOT 2.3* 2.1* 2.0*   PROT 5.5* 5.3* 5.3*   ALBUMIN 1.4* 1.4* 1.5*   ANIONGAP 6* 6* 8      Significant Imaging: I have reviewed and interpreted all pertinent imaging results/findings within the past 24 hours.    Assessment/Plan:      * Colitis  Abnormal LFTs  Hypokalemia  - Patient with intermittent nausea, vomiting and diarrhea.  Reports subjective fever.  CT abdomen with findings concerning for colitis.   - Completed 7 days of ciprofloxacin/metronidazole.  - Liver function tests with some improvement, cholestatic pattern initially.  - Patient with some clinical improvement in symptoms and stool studies negative for C.diff  - MRCP was negative for obstruction, noted gallbladder distention and large amount of ascites  - Case discussed with GI; paracentesis 07/02 demonstrating SAAG 1.1. NITIN, anti-mitochondrial, ASMA negative.  - Replete lytes as required. Monitor PO intake. Continue ondansetron, metoclopramide, continue PPN.  - EGD remarkable for Candida esophagitis and patient was started on fluconazole on 7/8  - Clinically improving with  decreased pain and increased p.o. intake, weaned off PPN as of this morning  - Appreciate GI assistance  - Intake improving, will monitor off PPN and possible discharge tomorrow with home health  - Discussed possible contribution of withdrawal to patient's presentation along with poor appetite/nutrition, educated the patient that this will be not restarted on discharge    SVT (supraventricular tachycardia)  - History SVT noted.  Followed by Cardiology, Dr. Doshi, outpatient.  States he takes digoxin and has had no recent episodes of SVT  - Continue digoxin 0.125 mg p.o. daily  - Monitor on telemetry    Type 2 diabetes mellitus, with long-term current use of insulin  - Recent HgbA1c 7.4 performed on 3/2024.  Patient has not been taking insulin and was on tirzepeptide and stopped taking in April  - Glucose level reviewed, in normal to slightly elevated range  - Continue monitor with Accu-Cheks q.a.c. and q.h.s. and give low-dose sliding scale insulin as necessary    Chronic diastolic heart failure  - Recent Echo performed 04/03/2024 showed EF 55-60%.  Patient states he no longer takes Lasix.  Lower extremity edema noted, patient denies shortness of breath  - Slight volume overload today in a setting with anasarca due to liver disease / hypoalbuminemia.  - Judicious IV Lasix 40 mg x 1 yesterday and with improvement noted, 1 more dose today  - Consulted cardiology, patient known to Dr. Dempsey, appreciate input    NAFLD (nonalcoholic fatty liver disease)  - History noted.  LFTs on admission elevated from baseline with , T bili 4.1,  and .  - As discussed above    Hepatitis B core antibody positive  - Patient states he was exposed to hep B while working in [place] in the 1990s and is on prophylaxis with lamivudine   - Continue lamivudine daily      VTE Risk Mitigation (From admission, onward)           Ordered     Place CHELE hose  Until discontinued         07/03/24 2056     IP VTE HIGH RISK PATIENT  Once          06/28/24 2107     Place sequential compression device  Until discontinued         06/28/24 2107                          Darby Obrien MD  Department of Hospital Medicine   [facility] ([place])

## 2024-07-14 VITALS
OXYGEN SATURATION: 90 % | HEART RATE: 105 BPM | TEMPERATURE: 99 F | HEIGHT: 72 IN | DIASTOLIC BLOOD PRESSURE: 74 MMHG | SYSTOLIC BLOOD PRESSURE: 106 MMHG | BODY MASS INDEX: 24.96 KG/M2 | WEIGHT: 184.31 LBS | RESPIRATION RATE: 20 BRPM

## 2024-07-14 LAB
ALBUMIN SERPL BCP-MCNC: 1.5 G/DL (ref 3.5–5.2)
ALP SERPL-CCNC: 809 U/L (ref 55–135)
ALT SERPL W/O P-5'-P-CCNC: 53 U/L (ref 10–44)
ANION GAP SERPL CALC-SCNC: 6 MMOL/L (ref 8–16)
AST SERPL-CCNC: 88 U/L (ref 10–40)
BASOPHILS # BLD AUTO: 0.08 K/UL (ref 0–0.2)
BASOPHILS NFR BLD: 1.7 % (ref 0–1.9)
BILIRUB SERPL-MCNC: 2 MG/DL (ref 0.1–1)
BUN SERPL-MCNC: 8 MG/DL (ref 8–23)
CALCIUM SERPL-MCNC: 7.6 MG/DL (ref 8.7–10.5)
CHLORIDE SERPL-SCNC: 98 MMOL/L (ref 95–110)
CO2 SERPL-SCNC: 28 MMOL/L (ref 23–29)
CREAT SERPL-MCNC: 0.6 MG/DL (ref 0.5–1.4)
DIFFERENTIAL METHOD BLD: ABNORMAL
EOSINOPHIL # BLD AUTO: 0.2 K/UL (ref 0–0.5)
EOSINOPHIL NFR BLD: 3.2 % (ref 0–8)
ERYTHROCYTE [DISTWIDTH] IN BLOOD BY AUTOMATED COUNT: 16.6 % (ref 11.5–14.5)
EST. GFR  (NO RACE VARIABLE): >60 ML/MIN/1.73 M^2
GLUCOSE SERPL-MCNC: 130 MG/DL (ref 70–110)
HCT VFR BLD AUTO: 30.3 % (ref 40–54)
HGB BLD-MCNC: 10 G/DL (ref 14–18)
IMM GRANULOCYTES # BLD AUTO: 0.02 K/UL (ref 0–0.04)
IMM GRANULOCYTES NFR BLD AUTO: 0.4 % (ref 0–0.5)
LYMPHOCYTES # BLD AUTO: 1.1 K/UL (ref 1–4.8)
LYMPHOCYTES NFR BLD: 23 % (ref 18–48)
MAGNESIUM SERPL-MCNC: 1.7 MG/DL (ref 1.6–2.6)
MCH RBC QN AUTO: 31 PG (ref 27–31)
MCHC RBC AUTO-ENTMCNC: 33 G/DL (ref 32–36)
MCV RBC AUTO: 94 FL (ref 82–98)
MONOCYTES # BLD AUTO: 0.7 K/UL (ref 0.3–1)
MONOCYTES NFR BLD: 15 % (ref 4–15)
NEUTROPHILS # BLD AUTO: 2.7 K/UL (ref 1.8–7.7)
NEUTROPHILS NFR BLD: 56.7 % (ref 38–73)
NRBC BLD-RTO: 0 /100 WBC
PHOSPHATE SERPL-MCNC: 3.5 MG/DL (ref 2.7–4.5)
PLATELET # BLD AUTO: 275 K/UL (ref 150–450)
PMV BLD AUTO: 10.9 FL (ref 9.2–12.9)
POCT GLUCOSE: 146 MG/DL (ref 70–110)
POTASSIUM SERPL-SCNC: 4 MMOL/L (ref 3.5–5.1)
PROT SERPL-MCNC: 5.4 G/DL (ref 6–8.4)
RBC # BLD AUTO: 3.23 M/UL (ref 4.6–6.2)
SODIUM SERPL-SCNC: 132 MMOL/L (ref 136–145)
WBC # BLD AUTO: 4.74 K/UL (ref 3.9–12.7)

## 2024-07-14 PROCEDURE — 94761 N-INVAS EAR/PLS OXIMETRY MLT: CPT

## 2024-07-14 PROCEDURE — 80053 COMPREHEN METABOLIC PANEL: CPT | Performed by: HOSPITALIST

## 2024-07-14 PROCEDURE — 85025 COMPLETE CBC W/AUTO DIFF WBC: CPT | Performed by: HOSPITALIST

## 2024-07-14 PROCEDURE — 36415 COLL VENOUS BLD VENIPUNCTURE: CPT | Performed by: HOSPITALIST

## 2024-07-14 PROCEDURE — 25000003 PHARM REV CODE 250: Performed by: NURSE PRACTITIONER

## 2024-07-14 PROCEDURE — 83735 ASSAY OF MAGNESIUM: CPT | Performed by: HOSPITALIST

## 2024-07-14 PROCEDURE — 63700000 PHARM REV CODE 250 ALT 637 W/O HCPCS: Performed by: INTERNAL MEDICINE

## 2024-07-14 PROCEDURE — 25000003 PHARM REV CODE 250: Performed by: INTERNAL MEDICINE

## 2024-07-14 PROCEDURE — 99232 SBSQ HOSP IP/OBS MODERATE 35: CPT | Mod: ,,, | Performed by: INTERNAL MEDICINE

## 2024-07-14 PROCEDURE — 25000003 PHARM REV CODE 250: Performed by: HOSPITALIST

## 2024-07-14 PROCEDURE — 84100 ASSAY OF PHOSPHORUS: CPT | Performed by: HOSPITALIST

## 2024-07-14 PROCEDURE — A4216 STERILE WATER/SALINE, 10 ML: HCPCS | Performed by: HOSPITALIST

## 2024-07-14 PROCEDURE — 63600175 PHARM REV CODE 636 W HCPCS: Performed by: INTERNAL MEDICINE

## 2024-07-14 RX ORDER — HYDROCODONE BITARTRATE AND ACETAMINOPHEN 7.5; 325 MG/1; MG/1
1 TABLET ORAL EVERY 6 HOURS PRN
Qty: 20 TABLET | Refills: 0 | Status: SHIPPED | OUTPATIENT
Start: 2024-07-14

## 2024-07-14 RX ORDER — HYDROCODONE BITARTRATE AND ACETAMINOPHEN 7.5; 3 MG/1; MG/1
1 TABLET ORAL EVERY 8 HOURS PRN
Qty: 20 TABLET | Refills: 0 | Status: SHIPPED | OUTPATIENT
Start: 2024-07-14 | End: 2024-07-14 | Stop reason: HOSPADM

## 2024-07-14 RX ORDER — FLUCONAZOLE 200 MG/1
200 TABLET ORAL DAILY
Qty: 7 TABLET | Refills: 0 | Status: SHIPPED | OUTPATIENT
Start: 2024-07-14 | End: 2024-07-21

## 2024-07-14 RX ADMIN — ONDANSETRON 4 MG: 2 INJECTION INTRAMUSCULAR; INTRAVENOUS at 05:07

## 2024-07-14 RX ADMIN — METOCLOPRAMIDE 10 MG: 10 TABLET ORAL at 06:07

## 2024-07-14 RX ADMIN — SODIUM CHLORIDE, PRESERVATIVE FREE 10 ML: 5 INJECTION INTRAVENOUS at 12:07

## 2024-07-14 RX ADMIN — OXYCODONE AND ACETAMINOPHEN 1 TABLET: 10; 325 TABLET ORAL at 10:07

## 2024-07-14 RX ADMIN — DIGOXIN 0.12 MG: 125 TABLET ORAL at 09:07

## 2024-07-14 RX ADMIN — ONDANSETRON 4 MG: 2 INJECTION INTRAMUSCULAR; INTRAVENOUS at 12:07

## 2024-07-14 RX ADMIN — OXYCODONE AND ACETAMINOPHEN 1 TABLET: 10; 325 TABLET ORAL at 05:07

## 2024-07-14 RX ADMIN — OXYCODONE AND ACETAMINOPHEN 1 TABLET: 10; 325 TABLET ORAL at 12:07

## 2024-07-14 RX ADMIN — FLUCONAZOLE 200 MG: 100 TABLET ORAL at 09:07

## 2024-07-14 RX ADMIN — Medication 1 EACH: at 09:07

## 2024-07-14 RX ADMIN — SODIUM CHLORIDE, PRESERVATIVE FREE 10 ML: 5 INJECTION INTRAVENOUS at 05:07

## 2024-07-14 RX ADMIN — PANTOPRAZOLE SODIUM 40 MG: 40 TABLET, DELAYED RELEASE ORAL at 09:07

## 2024-07-14 NOTE — PLAN OF CARE
Patient ready for discharge. Nephew at bedside. Belongings gathered in bags. Prescription for pain medication sent to [facility] for them to . Both patient and nephew verbalized complete understanding.

## 2024-07-14 NOTE — PROGRESS NOTES
[facility]    Admission date:  6/28/2024     Assessment    Chronic diastolic heart failure   Appears adequately compensated    Plan and Discussion    For discharge today.  Will resume usual outpatient follow-up.    Subjective    Comfortably lying supine on no supplemental oxygen with no respiratory distress    Medications  Current Facility-Administered Medications   Medication Dose Route Frequency Provider Last Rate Last Admin    acetaminophen tablet 650 mg  650 mg Oral Q8H PRN Valeria Patel, NP   650 mg at 07/09/24 1627    dextromethorphan-guaiFENesin  mg/5 ml liquid 10 mL  10 mL Oral Q4H PRN CARISA Lozano MD   10 mL at 07/13/24 0615    dextrose 10% bolus 125 mL 125 mL  12.5 g Intravenous PRN Valeria Patel NP        dextrose 10% bolus 250 mL 250 mL  25 g Intravenous PRN Valeria Patel, BASIA        digoxin tablet 0.125 mg  0.125 mg Oral Daily Valeria Patel, NP   0.125 mg at 07/14/24 0946    EScitalopram oxalate tablet 20 mg  20 mg Oral QHS Valeria Patel, NP   20 mg at 07/13/24 2135    fluconazole tablet 200 mg  200 mg Oral Daily CARISA Lozano MD   200 mg at 07/14/24 0945    glucagon (human recombinant) injection 1 mg  1 mg Intramuscular PRN Valeria Patel NP        glucagon (human recombinant) injection 1 mg  1 mg Intramuscular PRN Chilo Urban MD        glucose chewable tablet 16 g  16 g Oral PRN Valeria Patel NP   16 g at 07/05/24 0012    glucose chewable tablet 24 g  24 g Oral PRN Valeria Patel NP        hydrOXYzine HCL tablet 50 mg  50 mg Oral QID PRN CARISA Lozano MD   50 mg at 07/09/24 2143    insulin aspart U-100 pen 0-5 Units  0-5 Units Subcutaneous QID (AC + HS) PRN Valeria Patel NP   2 Units at 07/13/24 0845    lactobacillus acidophilus & bulgar 100 million cell packet 1 each  1 packet Oral TID  CARISA Lozano MD   1 each at 07/14/24 0945    lamiVUDine tablet 150 mg  150 mg Oral QHS Valeria Patel NP   150 mg at  07/13/24 2136    LIDOcaine 5 % patch 1 patch  1 patch Transdermal Q24H Valeria Patel, NP   1 patch at 07/07/24 2129    loperamide 1 mg/7.5 mL solution 1 mg  1 mg Oral QID PRN Janet Win MD        melatonin tablet 6 mg  6 mg Oral Nightly PRN Valeria Patel, BASIA   6 mg at 07/03/24 2354    metoclopramide HCl tablet 10 mg  10 mg Oral TID AC CARISA Lozano MD   10 mg at 07/14/24 0645    naloxone 0.4 mg/mL injection 0.02 mg  0.02 mg Intravenous PRN Valeria Patel NP        ondansetron injection 4 mg  4 mg Intravenous Q6H CARISA Lozano MD   4 mg at 07/14/24 0544    oxyCODONE-acetaminophen  mg per tablet 1 tablet  1 tablet Oral Q4H PRN CARISA Lozano MD   1 tablet at 07/14/24 1007    pantoprazole EC tablet 40 mg  40 mg Oral Daily Darby Obrien MD   40 mg at 07/14/24 0945    prochlorperazine injection Soln 5 mg  5 mg Intravenous Q6H PRN CARISA Lozano MD   5 mg at 07/05/24 0241    sodium chloride 0.9% flush 10 mL  10 mL Intravenous Q6H Darby Obrien MD   10 mL at 07/14/24 0544    And    sodium chloride 0.9% flush 10 mL  10 mL Intravenous PRN Darby Obrien MD   10 mL at 07/09/24 1149        Physical Exam    Temp:  [97.8 °F (36.6 °C)-98.8 °F (37.1 °C)]   Pulse:  [103-119]   Resp:  [16-20]   BP: (101-121)/(61-94)   SpO2:  [90 %-98 %]    Wt Readings from Last 3 Encounters:   07/14/24 83.6 kg (184 lb 4.9 oz)   06/26/24 99.8 kg (220 lb)   06/06/24 99.8 kg (220 lb)     Physical Exam  Constitutional:       General: He is not in acute distress.  Neck:      Vascular: No hepatojugular reflux or JVD.   Cardiovascular:      Rate and Rhythm: Normal rate and regular rhythm.      Heart sounds: S1 normal and S2 normal.   Pulmonary:      Effort: Pulmonary effort is normal.      Breath sounds: Normal breath sounds and air entry.   Abdominal:      General: Bowel sounds are normal.      Palpations: Abdomen is soft. There is no hepatomegaly.      Tenderness: There is no abdominal tenderness.    Musculoskeletal:      Right lower le+ Edema (pedal) present.      Left lower le+ Edema (pedal) present.   Skin:     Coloration: Skin is not pale.   Neurological:      Mental Status: He is alert.   Psychiatric:         Behavior: Behavior is cooperative.           Labs  Recent Results (from the past 24 hour(s))   POCT glucose    Collection Time: 24  4:59 PM   Result Value Ref Range    POCT Glucose 170 (H) 70 - 110 mg/dL   POCT glucose    Collection Time: 24  8:18 PM   Result Value Ref Range    POCT Glucose 162 (H) 70 - 110 mg/dL   POCT glucose    Collection Time: 24 11:40 PM   Result Value Ref Range    POCT Glucose 141 (H) 70 - 110 mg/dL   POCT glucose    Collection Time: 24  3:26 AM   Result Value Ref Range    POCT Glucose 146 (H) 70 - 110 mg/dL   Comprehensive Metabolic Panel    Collection Time: 24  7:28 AM   Result Value Ref Range    Sodium 132 (L) 136 - 145 mmol/L    Potassium 4.0 3.5 - 5.1 mmol/L    Chloride 98 95 - 110 mmol/L    CO2 28 23 - 29 mmol/L    Glucose 130 (H) 70 - 110 mg/dL    BUN 8 8 - 23 mg/dL    Creatinine 0.6 0.5 - 1.4 mg/dL    Calcium 7.6 (L) 8.7 - 10.5 mg/dL    Total Protein 5.4 (L) 6.0 - 8.4 g/dL    Albumin 1.5 (L) 3.5 - 5.2 g/dL    Total Bilirubin 2.0 (H) 0.1 - 1.0 mg/dL    Alkaline Phosphatase 809 (H) 55 - 135 U/L    AST 88 (H) 10 - 40 U/L    ALT 53 (H) 10 - 44 U/L    eGFR >60 >60 mL/min/1.73 m^2    Anion Gap 6 (L) 8 - 16 mmol/L   Magnesium    Collection Time: 24  7:28 AM   Result Value Ref Range    Magnesium 1.7 1.6 - 2.6 mg/dL   Phosphorus    Collection Time: 24  7:28 AM   Result Value Ref Range    Phosphorus 3.5 2.7 - 4.5 mg/dL   CBC Auto Differential    Collection Time: 24  7:28 AM   Result Value Ref Range    WBC 4.74 3.90 - 12.70 K/uL    RBC 3.23 (L) 4.60 - 6.20 M/uL    Hemoglobin 10.0 (L) 14.0 - 18.0 g/dL    Hematocrit 30.3 (L) 40.0 - 54.0 %    MCV 94 82 - 98 fL    MCH 31.0 27.0 - 31.0 pg    MCHC 33.0 32.0 - 36.0 g/dL    RDW 16.6  (H) 11.5 - 14.5 %    Platelets 275 150 - 450 K/uL    MPV 10.9 9.2 - 12.9 fL    Immature Granulocytes 0.4 0.0 - 0.5 %    Gran # (ANC) 2.7 1.8 - 7.7 K/uL    Immature Grans (Abs) 0.02 0.00 - 0.04 K/uL    Lymph # 1.1 1.0 - 4.8 K/uL    Mono # 0.7 0.3 - 1.0 K/uL    Eos # 0.2 0.0 - 0.5 K/uL    Baso # 0.08 0.00 - 0.20 K/uL    nRBC 0 0 /100 WBC    Gran % 56.7 38.0 - 73.0 %    Lymph % 23.0 18.0 - 48.0 %    Mono % 15.0 4.0 - 15.0 %    Eosinophil % 3.2 0.0 - 8.0 %    Basophil % 1.7 0.0 - 1.9 %    Differential Method Automated              Shemar Dempsey MD

## 2024-07-14 NOTE — PLAN OF CARE
Problem: Adult Inpatient Plan of Care  Goal: Plan of Care Review  Outcome: Adequate for Care Transition  Goal: Patient-Specific Goal (Individualized)  Outcome: Adequate for Care Transition  Goal: Absence of Hospital-Acquired Illness or Injury  Outcome: Adequate for Care Transition  Goal: Optimal Comfort and Wellbeing  Outcome: Adequate for Care Transition  Goal: Readiness for Transition of Care  Outcome: Adequate for Care Transition     Problem: Diabetes Comorbidity  Goal: Blood Glucose Level Within Targeted Range  Outcome: Adequate for Care Transition     Problem: Wound  Goal: Optimal Coping  Outcome: Adequate for Care Transition  Goal: Optimal Functional Ability  Outcome: Adequate for Care Transition  Goal: Absence of Infection Signs and Symptoms  Outcome: Adequate for Care Transition  Goal: Improved Oral Intake  Outcome: Adequate for Care Transition  Goal: Optimal Pain Control and Function  Outcome: Adequate for Care Transition  Goal: Skin Health and Integrity  Outcome: Adequate for Care Transition  Goal: Optimal Wound Healing  Outcome: Adequate for Care Transition     Problem: Infection  Goal: Absence of Infection Signs and Symptoms  Outcome: Adequate for Care Transition     Problem: Skin Injury Risk Increased  Goal: Skin Health and Integrity  Outcome: Adequate for Care Transition     Problem: Nausea and Vomiting  Goal: Nausea and Vomiting Relief  Outcome: Adequate for Care Transition     Problem: Constipation  Goal: Effective Bowel Elimination  Outcome: Adequate for Care Transition     Problem: Pain Acute  Goal: Optimal Pain Control and Function  Outcome: Adequate for Care Transition

## 2024-07-14 NOTE — PLAN OF CARE
Problem: Adult Inpatient Plan of Care  Goal: Plan of Care Review  Outcome: Progressing  Goal: Patient-Specific Goal (Individualized)  Outcome: Progressing  Goal: Absence of Hospital-Acquired Illness or Injury  Outcome: Progressing  Goal: Optimal Comfort and Wellbeing  Outcome: Progressing  Goal: Readiness for Transition of Care  Outcome: Progressing     Problem: Diabetes Comorbidity  Goal: Blood Glucose Level Within Targeted Range  Outcome: Progressing     Problem: Wound  Goal: Optimal Coping  Outcome: Progressing  Goal: Optimal Functional Ability  Outcome: Progressing  Goal: Absence of Infection Signs and Symptoms  Outcome: Progressing  Goal: Improved Oral Intake  Outcome: Progressing  Goal: Optimal Pain Control and Function  Outcome: Progressing  Goal: Skin Health and Integrity  Outcome: Progressing  Goal: Optimal Wound Healing  Outcome: Progressing     Problem: Skin Injury Risk Increased  Goal: Skin Health and Integrity  Outcome: Progressing     Problem: Nausea and Vomiting  Goal: Nausea and Vomiting Relief  Outcome: Progressing

## 2024-07-14 NOTE — PLAN OF CARE
07/14/24 0941   Medicare Message   Important Message from Medicare regarding Discharge Appeal Rights Given to patient/caregiver;Explained to patient/caregiver;Signed/date by patient/caregiver   Date IMM was signed 07/14/24   Time IMM was signed 0941

## 2024-07-14 NOTE — PLAN OF CARE
[facility] ([place])  Discharge Final Note    Primary Care Provider: Tushar Drew MD    Expected Discharge Date: 7/14/2024    Final Discharge Note (most recent)       Final Note - 07/14/24 0945          Final Note    Assessment Type Final Discharge Note (P)      Anticipated Discharge Disposition Home-Health Care Svc (P)      Hospital Resources/Appts/Education Provided Provided patient/caregiver with written discharge plan information;Appointments scheduled and added to AVS (P)         Post-Acute Status    Home Health Status Referrals Sent (P)      Patient choice form signed by patient/caregiver List with quality metrics by geographic area provided (P)      Discharge Delays None known at this time (P)                      Important Message from Medicare  Important Message from Medicare regarding Discharge Appeal Rights: Given to patient/caregiver, Explained to patient/caregiver, Signed/date by patient/caregiver     Date IMM was signed: 07/14/24  Time IMM was signed: 0941    Contact Info       [facility]   Specialty: Home Health Services, Home Therapy Services, Home Living Aide Services    [address]  [facility] [address]   Phone: [phone]       Next Steps: Follow up    Tushar Drew MD   Specialty: Internal Medicine   Relationship: PCP - General    [address] [address]   Phone: [phone]       Next Steps: Follow up in 1 week(s)    Shemar Dempsey MD   Specialty: Cardiovascular Disease, Cardiology, Interventional Cardiology   Relationship: Consulting Physician    [address]  [address]  [facility] [address]   Phone: [phone]       Next Steps: Follow up in 2 week(s)            Patient states he lives independently at home.     Family to provide transportation home.    All discharge needs/orders from CM perspective completed.    Patient will resume home health with [facility].

## 2024-07-14 NOTE — NURSING
AVS virtually reviewed with patient and his nephew Eldon in its entirety with emphasis on diet, medications, follow-up appointments and reasons to return to the ED or contact the [facility] On [facility] Nurse Care Line. Patient also encouraged to utilize their patient portal. Ease and convenience of use reiterated. Education complete and patient voiced understanding. All questions answered. Discharge teaching complete.

## 2024-07-15 ENCOUNTER — PATIENT OUTREACH (OUTPATIENT)
Dept: ADMINISTRATIVE | Facility: CLINIC | Age: 69
End: 2024-07-15
Payer: MEDICARE

## 2024-07-15 DIAGNOSIS — E11.65 TYPE 2 DIABETES MELLITUS WITH HYPERGLYCEMIA, WITH LONG-TERM CURRENT USE OF INSULIN: ICD-10-CM

## 2024-07-15 DIAGNOSIS — Z79.4 TYPE 2 DIABETES MELLITUS WITH HYPERGLYCEMIA, WITH LONG-TERM CURRENT USE OF INSULIN: ICD-10-CM

## 2024-07-15 RX ORDER — INSULIN PEN,REUSABLE,BT LISPRO
INSULIN PEN (EA) SUBCUTANEOUS
Qty: 1 EACH | Refills: 1 | Status: SHIPPED | OUTPATIENT
Start: 2024-07-15

## 2024-07-15 NOTE — PROGRESS NOTES
Pt states he is feeling too bad to complete call today, requested call back tomorrow afternoon.

## 2024-07-15 NOTE — PROGRESS NOTES
2nd attempt made to reach patient for TCC call. Left voicemail please call [phone] and leave first name, last name and  for Tomás. I will return your call.

## 2024-07-15 NOTE — PROGRESS NOTES
C3 nurse attempted to contact Nithin Wagner for a TCC post hospital discharge follow up call. The patient is unable to conduct the call @ this time. The patient requested a callback.    The patient does not have a scheduled HOSFU appointment within 5-7 days post hospital discharge date 07/14/24. Message sent to Physician staff to assist with HOSFU appointment scheduling.

## 2024-07-16 RX ORDER — DIGOXIN 125 MCG
0.12 TABLET ORAL
Qty: 90 TABLET | Refills: 3 | Status: SHIPPED | OUTPATIENT
Start: 2024-07-16

## 2024-07-19 ENCOUNTER — LAB VISIT (OUTPATIENT)
Dept: LAB | Facility: HOSPITAL | Age: 69
End: 2024-07-19
Attending: INTERNAL MEDICINE
Payer: MEDICARE

## 2024-07-19 DIAGNOSIS — Z00.00 ROUTINE GENERAL MEDICAL EXAMINATION AT A HEALTH CARE FACILITY: Primary | ICD-10-CM

## 2024-07-19 LAB
ALBUMIN SERPL BCP-MCNC: 1.4 G/DL (ref 3.5–5.2)
ALP SERPL-CCNC: 580 U/L (ref 55–135)
ALT SERPL W/O P-5'-P-CCNC: 34 U/L (ref 10–44)
ANION GAP SERPL CALC-SCNC: 10 MMOL/L (ref 8–16)
AST SERPL-CCNC: 63 U/L (ref 10–40)
BASOPHILS # BLD AUTO: 0.07 K/UL (ref 0–0.2)
BASOPHILS NFR BLD: 1.2 % (ref 0–1.9)
BILIRUB SERPL-MCNC: 1.8 MG/DL (ref 0.1–1)
BUN SERPL-MCNC: 7 MG/DL (ref 8–23)
CALCIUM SERPL-MCNC: 7.3 MG/DL (ref 8.7–10.5)
CHLORIDE SERPL-SCNC: 103 MMOL/L (ref 95–110)
CO2 SERPL-SCNC: 26 MMOL/L (ref 23–29)
CREAT SERPL-MCNC: 0.7 MG/DL (ref 0.5–1.4)
DIFFERENTIAL METHOD BLD: ABNORMAL
EOSINOPHIL # BLD AUTO: 0.3 K/UL (ref 0–0.5)
EOSINOPHIL NFR BLD: 4.5 % (ref 0–8)
ERYTHROCYTE [DISTWIDTH] IN BLOOD BY AUTOMATED COUNT: 17.7 % (ref 11.5–14.5)
EST. GFR  (NO RACE VARIABLE): >60 ML/MIN/1.73 M^2
GLUCOSE SERPL-MCNC: 78 MG/DL (ref 70–110)
HCT VFR BLD AUTO: 31 % (ref 40–54)
HGB BLD-MCNC: 10.2 G/DL (ref 14–18)
IMM GRANULOCYTES # BLD AUTO: 0.01 K/UL (ref 0–0.04)
IMM GRANULOCYTES NFR BLD AUTO: 0.2 % (ref 0–0.5)
LYMPHOCYTES # BLD AUTO: 1.6 K/UL (ref 1–4.8)
LYMPHOCYTES NFR BLD: 26.8 % (ref 18–48)
MCH RBC QN AUTO: 31.4 PG (ref 27–31)
MCHC RBC AUTO-ENTMCNC: 32.9 G/DL (ref 32–36)
MCV RBC AUTO: 95 FL (ref 82–98)
MONOCYTES # BLD AUTO: 0.7 K/UL (ref 0.3–1)
MONOCYTES NFR BLD: 12.6 % (ref 4–15)
NEUTROPHILS # BLD AUTO: 3.2 K/UL (ref 1.8–7.7)
NEUTROPHILS NFR BLD: 54.7 % (ref 38–73)
NRBC BLD-RTO: 0 /100 WBC
PLATELET # BLD AUTO: 383 K/UL (ref 150–450)
PMV BLD AUTO: 12 FL (ref 9.2–12.9)
POTASSIUM SERPL-SCNC: 3.9 MMOL/L (ref 3.5–5.1)
PROT SERPL-MCNC: 5.1 G/DL (ref 6–8.4)
RBC # BLD AUTO: 3.25 M/UL (ref 4.6–6.2)
SODIUM SERPL-SCNC: 139 MMOL/L (ref 136–145)
WBC # BLD AUTO: 5.79 K/UL (ref 3.9–12.7)

## 2024-07-19 PROCEDURE — 85025 COMPLETE CBC W/AUTO DIFF WBC: CPT | Performed by: INTERNAL MEDICINE

## 2024-07-19 PROCEDURE — 80053 COMPREHEN METABOLIC PANEL: CPT | Performed by: INTERNAL MEDICINE

## 2024-07-19 PROCEDURE — 84443 ASSAY THYROID STIM HORMONE: CPT | Performed by: INTERNAL MEDICINE

## 2024-07-19 PROCEDURE — 82533 TOTAL CORTISOL: CPT | Performed by: INTERNAL MEDICINE

## 2024-07-20 LAB
CORTIS SERPL-MCNC: 11.3 UG/DL
TSH SERPL DL<=0.005 MIU/L-ACNC: 2.97 UIU/ML (ref 0.4–4)

## 2024-07-21 NOTE — DISCHARGE SUMMARY
[facility], [place] [facility]  Discharge Summary      Patient Name: Nithin Wagner  MRN: [id number]  [facility]: [id number]  Patient Class: IP- Inpatient  Admission Date: 6/28/2024  Hospital Length of Stay: 15 days  Discharge Date and Time: 7/14/2024 12:40 PM  Attending Physician: Darby Obrien MD  Discharging Provider: Darby Obrien MD  Primary Care Provider: Tushar Drew MD    Primary Care Team: Networked reference to record PCT     HPI:   Nithin Wagner is a 68 y.o. male with a past medical history of hepatitis-B, hypertension, diastolic heart failure, diabetes, rheumatoid arthritis, hyponatremia, s/p s/p T11 and L3 kyphoplasty on 4/22/2024 who presents to the ED with lower abdominal pain recurrent onset 4 days ago.  Patient reports associated nausea and vomiting and states he has been experiencing intermittent diarrhea over the past several months.  He denies blood in stool.  Patient  was seen here on i 06/26/2024 for emesis and weakness. Workup showed findings suggestive of colitis and dehydration. He was offered admission at that time but he stated he would prefer to go home. Stated he went home and was unable to hold down significant fluids and came back.  Reports subjective fever a few days ago.  Had a normal bowel movement this morning     ED workup significant for unremarkable CBC, mild hyponatremia 132, T bili 4.1, albumin 1.6,  and .  Lactic acid 1.2.  CT abdomen performed 06/26/2024 showed diffuse colitis.  Patient was started on IV Cipro and Flagyl in the ED and referred to Hospital Medicine.  Patient will be admitted for further evaluation and management.    Procedure(s) (LRB):  EGD (ESOPHAGOGASTRODUODENOSCOPY) (N/A)      Hospital Course:   Mr. Wagner presented with abdominal pain with nausea and vomiting.  Treatment initiated for colitis with IV Cipro and Flagyl, IV fluids, antiemetics and supportive care.  GI consulted. Underwent paracentesis. LFTs gradually improved though  appetite remained poor. Completed full course of antibiotic therapy. Autoimmune workup for liver disease unremarkable. Scheduled antiemetics with improvement in symptoms though not in appetite. Started PPN. EGD performed 07/08 demonstrating white plaques in esophagus and gastritis; started fluconazole. Review of home medication showed he was taking tirzepatide which was not restarted during hospitalization and discontinued altogether given it likely contributed to his clinical symptoms.  Patient was debilitated, seen by PT and OT, and arranged for home health.    Colitis  Abnormal LFTs  Hypokalemia and debility  - Patient with intermittent nausea, vomiting and diarrhea.  Reported subjective fever.  CT abdomen with findings concerning for colitis.   - Completed 7 days of ciprofloxacin/metronidazole.  - Liver function tests with some improvement, cholestatic pattern initially.  - Patient with some clinical improvement in symptoms and stool studies negative for C.diff  - MRCP was negative for obstruction, noted gallbladder distention and large amount of ascites  - Case discussed with GI; paracentesis 07/02 demonstrating SAAG 1.1. NITIN, anti-mitochondrial, ASMA negative.  - Replete lytes as required. Monitored PO intake. Continued ondansetron, metoclopramide, continued PPN.  - EGD remarkable for Candida esophagitis and patient was started on fluconazole on 7/8  - Clinically was improving with decreased pain and increased p.o. intake, weaned off PPN   - Appreciate GI assistance  - Intake was improving, monitored off PPN   - Review of home medications showed he was taking tirzepatide which was not restarted during hospitalization and discontinued altogether given it likely contributed to presentation; educated the patient that this will be not restarted on discharge  - Arranged for home health with follow up PCP and GI     SVT (supraventricular tachycardia)  - History SVT noted.  Followed by Cardiology, Dr. Doshi,  outpatient.  States he takes digoxin and has had no recent episodes of SVT  - Continued digoxin 0.125 mg p.o. daily  - Monitored on telemetry     Type 2 diabetes mellitus, with long-term current use of insulin  - Recent HgbA1c 7.4 performed on 3/2024.  Patient has not been taking insulin and was on tirzepeptide and stopped taking in April  - Glucose level reviewed, was in normal to slightly elevated range  - Continued with Accu-Cheks q.a.c. and q.h.s. and low-dose sliding scale insulin as necessary     Chronic diastolic heart failure  - Recent Echo performed 04/03/2024 showed EF 55-60%.  Patient stated he no longer takes Lasix.  Lower extremity edema noted, patient denied shortness of breath  - Slight volume overload in a setting with anasarca due to liver disease / hypoalbuminemia.  - Judicious IV Lasix 40 mg x 1 and with improvement   - Consulted cardiology, patient known to Dr. Dempsey, appreciated input     NAFLD (nonalcoholic fatty liver disease)  - History noted.  LFTs on admission elevated from baseline with , T bili 4.1,  and .  - As discussed above     Hepatitis B core antibody positive  - Patient stated he was exposed to hep B while working in [place] in the 1990s and is on prophylaxis with lamivudine   - Continued lamivudine daily       Goals of Care Treatment Preferences:  Code Status: Full Code      Consults:   Consults (From admission, onward)          Status Ordering Provider     Inpatient consult to Registered Dietitian/Nutritionist  Once        Provider:  (Not yet assigned)    Completed VIVEK LA     Inpatient consult to Midline team  Once        Provider:  (Not yet assigned)    Completed PATRICK STEPHEN     Inpatient consult to Registered Dietitian/Nutritionist  Once        Provider:  (Not yet assigned)    Completed CARISA MENDOZA     Inpatient consult to Interventional Radiology  Once        Provider:  (Not yet assigned)    Completed KOKI MUNIZ     Inpatient  consult to Midline team  Once        Provider:  (Not yet assigned)    Completed KOKI MUNIZ     Inpatient consult to Social Work/Case Management  Once        Provider:  (Not yet assigned)    Completed KOKI MUINZ     Inpatient consult to Gastroenterology  Once        Provider:  (Not yet assigned)    Completed AMANDA DUNCAN            Service: Hospital Medicine    Final Active Diagnoses:    Diagnosis Date Noted POA    PRINCIPAL PROBLEM:  Colitis [K52.9] 06/28/2024 Yes    Abnormal LFTs (liver function tests) [R79.89] 07/07/2024 Yes    Severe protein-calorie malnutrition [E43] 07/04/2024 Yes    SVT (supraventricular tachycardia) [I47.10] 06/28/2024 Yes    Chronic diastolic heart failure [I50.32] 11/12/2020 Yes    Type 2 diabetes mellitus, with long-term current use of insulin [E11.9, Z79.4] 11/12/2020 Not Applicable    NAFLD (nonalcoholic fatty liver disease) [K76.0] 08/21/2020 Yes    Hepatitis B core antibody positive [R76.8] 04/24/2020 Yes      Problems Resolved During this Admission:       Discharged Condition: good    Disposition: Home-Health Care Svc    Follow Up:   Follow-up Information       [facility] Follow up.    Specialties: Home Health Services, Home Therapy Services, Home Living Aide Services  Contact information:  [address]  [address]  [facility] [address]  [phone]             Tushar Drew MD Follow up in 1 week(s).    Specialty: Internal Medicine  Contact information:  [address] [facility] [address]  [phone]               Shemar Dempsey MD Follow up in 2 week(s).    Specialties: Cardiovascular Disease, Cardiology, Interventional Cardiology  Contact information:  [address]  [address]  [facility] [id number] [phone]                           Patient Instructions:      Diet Cardiac     Diet diabetic     Activity as tolerated       Significant Diagnostic Studies: N/A    Pending Diagnostic Studies:       None          "  Medications:  Reconciled Home Medications:      Medication List        START taking these medications      fluconazole 200 MG Tab  Commonly known as: DIFLUCAN  Take 1 tablet (200 mg total) by mouth once daily. for 7 days     HYDROcodone-acetaminophen 7.5-325 mg per tablet  Commonly known as: NORCO  Take 1 tablet by mouth every 6 (six) hours as needed for Pain.  Replaces: HYDROcodone-acetaminophen 7.5-300 mg Tab            CONTINUE taking these medications      BD ULTRA-FINE RAHUL PEN NEEDLE 32 gauge x 5/32" Ndle  Generic drug: pen needle, diabetic  To use 6 daily     diclofenac sodium 3 % gel  Commonly known as: SOLARAZE  Apply to affected area three times a day.     ergocalciferol 50,000 unit Cap  Commonly known as: ERGOCALCIFEROL  Take 1 capsule (50,000 Units total) by mouth every 7 days.     EScitalopram oxalate 20 MG tablet  Commonly known as: LEXAPRO  Take 20 mg by mouth every evening.     folic acid 1 MG tablet  Commonly known as: FOLVITE  Take 1 tablet (1 mg total) by mouth once daily.     gabapentin 400 MG capsule  Commonly known as: NEURONTIN  Take 1 capsule (400 mg total) by mouth 3 (three) times daily.     ketoconazole 2 % cream  Commonly known as: NIZORAL  Apply to affected area DAILY     Lactobacillus rhamnosus GG 10 billion cell capsule  Commonly known as: CULTURELLE  Take 1 capsule by mouth once daily.     lamiVUDine 150 MG Tab  Commonly known as: EPIVIR  TAKE 1 TABLET BY MOUTH EVERY DAY     LIDOcaine 5 %  Commonly known as: LIDODERM  Place 1 patch onto the skin once daily. Remove & Discard patch within 12 hours or as directed by MD     multivitamin per tablet  Commonly known as: THERAGRAN  Take 1 tablet by mouth once daily.     naloxone 4 mg/actuation Spry  Commonly known as: NARCAN  USE AS DIRECTED INTRANASAL FOR SUSPECTED DRUG OVERDOSE     nitroGLYCERIN 0.4 MG SL tablet  Commonly known as: NITROSTAT  PLACE 1 TABLET UNDER THE TONGUE EVERY 5 MINUTES AS NEEDED FOR CHEST PAIN.     nystatin " powder  Commonly known as: MYCOSTATIN  Apply topically 4 (four) times daily. Apply to intertriginous areas as directed up to four times daily to reduce moisture. for 14 days     polyethylene glycol 17 gram/dose powder  Commonly known as: GLYCOLAX  Use cap to measure 17 grams, mix in liquid and take by mouth once daily.     PROAIR HFA 90 mcg/actuation inhaler  Generic drug: albuterol            STOP taking these medications      HYDROcodone-acetaminophen 7.5-300 mg Tab  Replaced by: HYDROcodone-acetaminophen 7.5-325 mg per tablet     MOUNJARO 7.5 mg/0.5 mL Pnij  Generic drug: tirzepatide     oxyCODONE 10 mg Tab immediate release tablet  Commonly known as: ROXICODONE              Indwelling Lines/Drains at time of discharge:   Lines/Drains/Airways       None                   Time spent on the discharge of patient: 40 minutes         Darby Obrien MD  Department of Hospital Medicine  [facility] ([place])

## 2024-07-24 ENCOUNTER — CLINICAL SUPPORT (OUTPATIENT)
Dept: DIABETES | Facility: CLINIC | Age: 69
End: 2024-07-24
Payer: MEDICARE

## 2024-07-24 ENCOUNTER — HOSPITAL ENCOUNTER (EMERGENCY)
Facility: OTHER | Age: 69
Discharge: HOME | End: 2024-07-25
Attending: INTERNAL MEDICINE
Payer: MEDICARE

## 2024-07-24 DIAGNOSIS — R06.02 SHORTNESS OF BREATH: ICD-10-CM

## 2024-07-24 DIAGNOSIS — Z79.4 TYPE 2 DIABETES MELLITUS WITH OTHER CIRCULATORY COMPLICATION, WITH LONG-TERM CURRENT USE OF INSULIN: Primary | ICD-10-CM

## 2024-07-24 DIAGNOSIS — E11.59 TYPE 2 DIABETES MELLITUS WITH OTHER CIRCULATORY COMPLICATION, WITH LONG-TERM CURRENT USE OF INSULIN: Primary | ICD-10-CM

## 2024-07-24 DIAGNOSIS — R42 LIGHTHEADEDNESS: Primary | ICD-10-CM

## 2024-07-24 DIAGNOSIS — R94.31 T WAVE INVERSION IN EKG: ICD-10-CM

## 2024-07-24 DIAGNOSIS — R42 DIZZY: ICD-10-CM

## 2024-07-24 LAB
ALBUMIN SERPL BCP-MCNC: 1.5 G/DL (ref 3.5–5.2)
ALP SERPL-CCNC: 719 U/L (ref 55–135)
ALT SERPL W/O P-5'-P-CCNC: 37 U/L (ref 10–44)
ANION GAP SERPL CALC-SCNC: 9 MMOL/L (ref 8–16)
AST SERPL-CCNC: 76 U/L (ref 10–40)
BASOPHILS # BLD AUTO: 0.1 K/UL (ref 0–0.2)
BASOPHILS NFR BLD: 1.8 % (ref 0–1.9)
BILIRUB SERPL-MCNC: 1.8 MG/DL (ref 0.1–1)
BNP SERPL-MCNC: 1019 PG/ML (ref 0–99)
BUN SERPL-MCNC: 6 MG/DL (ref 8–23)
CALCIUM SERPL-MCNC: 7.6 MG/DL (ref 8.7–10.5)
CHLORIDE SERPL-SCNC: 106 MMOL/L (ref 95–110)
CO2 SERPL-SCNC: 21 MMOL/L (ref 23–29)
CREAT SERPL-MCNC: 0.6 MG/DL (ref 0.5–1.4)
CTP QC/QA: YES
DIFFERENTIAL METHOD BLD: ABNORMAL
EOSINOPHIL # BLD AUTO: 0.2 K/UL (ref 0–0.5)
EOSINOPHIL NFR BLD: 3.4 % (ref 0–8)
ERYTHROCYTE [DISTWIDTH] IN BLOOD BY AUTOMATED COUNT: 17.8 % (ref 11.5–14.5)
EST. GFR  (NO RACE VARIABLE): >60 ML/MIN/1.73 M^2
GLUCOSE SERPL-MCNC: 86 MG/DL (ref 70–110)
HCO3 UR-SCNC: 25.2 MMOL/L (ref 24–28)
HCT VFR BLD AUTO: 35.6 % (ref 40–54)
HCT VFR BLD CALC: 33 %PCV (ref 36–54)
HGB BLD-MCNC: 11 G/DL
HGB BLD-MCNC: 11.7 G/DL (ref 14–18)
IMM GRANULOCYTES # BLD AUTO: 0.03 K/UL (ref 0–0.04)
IMM GRANULOCYTES NFR BLD AUTO: 0.5 % (ref 0–0.5)
LYMPHOCYTES # BLD AUTO: 1.9 K/UL (ref 1–4.8)
LYMPHOCYTES NFR BLD: 33.9 % (ref 18–48)
MAGNESIUM SERPL-MCNC: 1.8 MG/DL (ref 1.6–2.6)
MCH RBC QN AUTO: 30.6 PG (ref 27–31)
MCHC RBC AUTO-ENTMCNC: 32.9 G/DL (ref 32–36)
MCV RBC AUTO: 93 FL (ref 82–98)
MONOCYTES # BLD AUTO: 0.6 K/UL (ref 0.3–1)
MONOCYTES NFR BLD: 10.1 % (ref 4–15)
NEUTROPHILS # BLD AUTO: 2.8 K/UL (ref 1.8–7.7)
NEUTROPHILS NFR BLD: 50.3 % (ref 38–73)
NRBC BLD-RTO: 0 /100 WBC
PCO2 BLDA: 32.4 MMHG (ref 35–45)
PH SMN: 7.5 [PH] (ref 7.35–7.45)
PLATELET # BLD AUTO: 349 K/UL (ref 150–450)
PMV BLD AUTO: 11 FL (ref 9.2–12.9)
PO2 BLDA: 34 MMHG (ref 40–60)
POC BE: 2 MMOL/L
POC IONIZED CALCIUM: 0.95 MMOL/L (ref 1.06–1.42)
POC SATURATED O2: 72 % (ref 95–100)
POC TCO2: 26 MMOL/L (ref 24–29)
POTASSIUM BLD-SCNC: 4.1 MMOL/L (ref 3.5–5.1)
POTASSIUM SERPL-SCNC: 3.6 MMOL/L (ref 3.5–5.1)
PROT SERPL-MCNC: 5.6 G/DL (ref 6–8.4)
RBC # BLD AUTO: 3.82 M/UL (ref 4.6–6.2)
SAMPLE: ABNORMAL
SARS-COV-2 RDRP RESP QL NAA+PROBE: NEGATIVE
SODIUM BLD-SCNC: 136 MMOL/L (ref 136–145)
SODIUM SERPL-SCNC: 136 MMOL/L (ref 136–145)
TROPONIN I SERPL DL<=0.01 NG/ML-MCNC: 0.01 NG/ML (ref 0–0.03)
WBC # BLD AUTO: 5.54 K/UL (ref 3.9–12.7)

## 2024-07-24 PROCEDURE — 93005 ELECTROCARDIOGRAM TRACING: CPT

## 2024-07-24 PROCEDURE — 85025 COMPLETE CBC W/AUTO DIFF WBC: CPT | Performed by: INTERNAL MEDICINE

## 2024-07-24 PROCEDURE — 96360 HYDRATION IV INFUSION INIT: CPT

## 2024-07-24 PROCEDURE — 83735 ASSAY OF MAGNESIUM: CPT | Performed by: INTERNAL MEDICINE

## 2024-07-24 PROCEDURE — 84484 ASSAY OF TROPONIN QUANT: CPT | Performed by: INTERNAL MEDICINE

## 2024-07-24 PROCEDURE — 87635 SARS-COV-2 COVID-19 AMP PRB: CPT | Performed by: INTERNAL MEDICINE

## 2024-07-24 PROCEDURE — 80053 COMPREHEN METABOLIC PANEL: CPT | Performed by: INTERNAL MEDICINE

## 2024-07-24 PROCEDURE — 25000003 PHARM REV CODE 250: Performed by: INTERNAL MEDICINE

## 2024-07-24 PROCEDURE — 99285 EMERGENCY DEPT VISIT HI MDM: CPT | Mod: 25

## 2024-07-24 PROCEDURE — 83880 ASSAY OF NATRIURETIC PEPTIDE: CPT | Performed by: INTERNAL MEDICINE

## 2024-07-24 PROCEDURE — 93010 ELECTROCARDIOGRAM REPORT: CPT | Mod: 76,,, | Performed by: INTERNAL MEDICINE

## 2024-07-24 PROCEDURE — 93010 ELECTROCARDIOGRAM REPORT: CPT | Mod: ,,, | Performed by: INTERNAL MEDICINE

## 2024-07-24 RX ADMIN — SODIUM CHLORIDE 500 ML: 9 INJECTION, SOLUTION INTRAVENOUS at 09:07

## 2024-07-24 NOTE — PROGRESS NOTES
Diabetes Care Specialist Virtual Visit Note     The patient location is: [place]  The chief complaint leading to consultation is: Diabetes  Visit type: audiovisual  Total time spent with patient: 20 min   Each patient to whom he or she provides medical services by telemedicine is:  (1) informed of the relationship between the physician and patient and the respective role of any other health care provider with respect to management of the patient; and (2) notified that he or she may decline to receive medical services by telemedicine and may withdraw from such care at any time.      Diabetes Care Specialist Progress Note  Author: Lalitha Stroud RN, CDE  Date: 7/24/2024    Intake    Program Intake  Reason for Diabetes Program Visit:: Intervention  Type of Intervention:: Individual  Individual: Education  Education: Self-Management Skill Review, Nutrition and Meal Planning  Current diabetes risk level:: low    Current Diabetes Treatment: Diet/Exercise, Insulin    Continuous Glucose Monitoring  Patient has CGM: Yes  Personal CGM type:: Dexcom    Lab Results   Component Value Date    HGBA1C 7.4 (H) 03/01/2024       Lifestyle Coping Support & Clinical       Pt with numerous recent hospitalizations. Still feeling very poorly. PCP not in [facility] system - pt reports trying to get at home infusions for dehydration. Reports feeling weak, shaky and still unable to keep food down. Encouraged pt to get to ER per last provider recommendations based on continuation of symptoms.        Diabetes Self-Management Skills Assessment    Medication Skills Assessment  Patient is able to identify current diabetes medications, dosages, and appropriate timing of medications.: yes  Patient reports problems or concerns with current medication regimen.: no  Medication Skills Assessment Completed:: Yes  Assessment indicates:: Adequate understanding  Area of need?: No    Home Blood Glucose Monitoring  Personal CGM type:: Dexcom    Assessment Summary  and Plan    Based on today's diabetes care assessment, the following areas of need were identified:          7/24/2024    12:01 AM   Areas of Need   Medications/Current Diabetes Treatment No     Today's interventions were provided through individual discussion, instruction, and written materials were provided.      Patient verbalized understanding of instruction and written materials.  Pt was able to return back demonstration of instructions today. Patient understood key points, needs reinforcement and further instruction.     Diabetes Self-Management Care Plan:    Today's Diabetes Self-Management Care Plan was developed with Nithin's input. Nithin has agreed to work toward the following goal(s) to improve his/her overall diabetes control.        Follow Up Plan     Follow up if symptoms worsen or fail to improve.    Today's care plan and follow up schedule was discussed with patient.  Nithin verbalized understanding of the care plan, goals, and agrees to follow up plan.        The patient was encouraged to communicate with his/her health care provider/physician and care team regarding his/her condition(s) and treatment.  I provided the patient with my contact information today and encouraged to contact me via phone or [facility] Patient Portal as needed.     Length of Visit   Total Time: 20 Minutes

## 2024-07-25 VITALS
SYSTOLIC BLOOD PRESSURE: 101 MMHG | OXYGEN SATURATION: 96 % | DIASTOLIC BLOOD PRESSURE: 71 MMHG | TEMPERATURE: 98 F | HEART RATE: 97 BPM | RESPIRATION RATE: 18 BRPM

## 2024-07-25 LAB
OHS QRS DURATION: 92 MS
OHS QRS DURATION: 98 MS
OHS QTC CALCULATION: 523 MS
OHS QTC CALCULATION: 525 MS

## 2024-07-25 NOTE — ED PROVIDER NOTES
"Encounter date: 8:26 PM 07/25/2024    Source of History   Patient/EMS    Chief Complaint   Pt presents with:   Dizziness (Light headed and dizzy x 2 days, PCP told to come to ED for dehydration and c/o LLQ abd pain. EMS reports runs of SVT. )      History Of Present Illness   Nithin Wagner is a 68 y.o. male with hepatitis, hypertension, heart failure, diabetes, polycythemia vera, CAD history of SVT on digoxin, anxiety, hyperlipidemia history of nephrolithiasis who presents to the ED with chief complaint of lightheadedness and concerns for dehydration.  Patient states that since he was discharged he was still had ongoing abdominal pain.  He states that he has been intermittent.  He states that he has had decreased p.o. intake.  He states that he feels dehydrated.  He noted lightheadedness.  He denies chest pain or shortness of breath or palpitations.  EMS reported a run of SVT in route.  Otherwise they noted that his glucose and vital signs were stable.    This is the extent to the patients complaints today here in the emergency department.  Past History     Review of patient's allergies indicates:   Allergen Reactions    Enbrel [etanercept] Shortness Of Breath     CHF    Nsaids (non-steroidal anti-inflammatory drug) Other (See Comments)     hypertention    Other reaction(s): Other (See Comments)    HTN    Patient states he tolerates anti inflammatory eye drops    Statins-hmg-coa reductase inhibitors Other (See Comments)     Heart arrythemias    Other reaction(s): Other (See Comments)    Joint pain and cardiac arrythmias    Pcn [penicillins] Rash       No current facility-administered medications on file prior to encounter.     Current Outpatient Medications on File Prior to Encounter   Medication Sig Dispense Refill    BD ULTRA-FINE RAHUL PEN NEEDLE 32 gauge x 5/32" Ndle To use 6 daily 400 each 3    diclofenac sodium (SOLARAZE) 3 % gel Apply to affected area three times a day. 100 g 5    diclofenac sodium (SOLARAZE) " 3 % gel Apply topically to the affected area 3 (three) times daily. 100 g 5    digoxin (LANOXIN) 125 mcg tablet TAKE 1 TABLET BY MOUTH EVERY DAY 90 tablet 3    ergocalciferol (ERGOCALCIFEROL) 50,000 unit Cap Take 1 capsule (50,000 Units total) by mouth every 7 days. 12 capsule 3    EScitalopram oxalate (LEXAPRO) 20 MG tablet Take 20 mg by mouth every evening.      folic acid (FOLVITE) 1 MG tablet Take 1 tablet (1 mg total) by mouth once daily. 30 tablet 5    gabapentin (NEURONTIN) 400 MG capsule Take 1 capsule (400 mg total) by mouth 3 (three) times daily. 90 capsule 11    HYDROcodone-acetaminophen (NORCO) 7.5-325 mg per tablet Take 1 tablet by mouth every 6 (six) hours as needed for Pain. 20 tablet 0    HYDROcodone-acetaminophen 7.5-300 mg Tab 1 TAB PO Q 6 H PRN PAIN, GREATER THAN 7 DAYS MEDICALLY NECESSARY 30 tablet 0    insulin pen,reusable,BT,aspart (INPEN, NOVOLOG OR FIASP, PINK) InPn To use with inpen cartridges 1 each 1    insulin pen,reusable,BT,lispro (INPEN, FOR HUMALOG, PINK) InPn use as directed with inpen cartridge 1 each 1    ketoconazole (NIZORAL) 2 % cream Apply to affected area DAILY 30 g 3    Lactobacillus rhamnosus GG (CULTURELLE) 10 billion cell capsule Take 1 capsule by mouth once daily. 30 capsule 2    lamiVUDine (EPIVIR) 150 MG Tab TAKE 1 TABLET BY MOUTH EVERY DAY 30 tablet 23    LIDOcaine (LIDODERM) 5 % Place 1 patch onto the skin once daily. Remove & Discard patch within 12 hours or as directed by MD 30 patch 2    multivitamin (THERAGRAN) per tablet Take 1 tablet by mouth once daily.      mupirocin (BACTROBAN) 2 % ointment APPLY TO LEFT ARM ULCER DAILY 22 g 2    naloxone (NARCAN) 4 mg/actuation Spry USE AS DIRECTED INTRANASAL FOR SUSPECTED DRUG OVERDOSE 1 each 1    nitroGLYCERIN (NITROSTAT) 0.4 MG SL tablet PLACE 1 TABLET UNDER THE TONGUE EVERY 5 MINUTES AS NEEDED FOR CHEST PAIN. 100 tablet 2    nystatin (MYCOSTATIN) powder Apply topically 4 (four) times daily. Apply to intertriginous areas  as directed up to four times daily to reduce moisture. for 14 days 30 g 0    ondansetron (ZOFRAN) 4 MG tablet Take 1 tablet (4 mg total) by mouth every 8 (eight) hours as needed for Nausea. 20 tablet 0    polyethylene glycol (GLYCOLAX) 17 gram/dose powder Use cap to measure 17 grams, mix in liquid and take by mouth once daily. 510 g 2    PROAIR HFA 90 mcg/actuation inhaler          As per HPI and below:  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    forehead    Stroke     TIA     Past Surgical History:   Procedure Laterality Date    CATARACT EXTRACTION W/  INTRAOCULAR LENS IMPLANT Right 3/28/2024    Procedure: EXTRACTION, CATARACT, WITH IOL INSERTION;  Surgeon: Hans Woodward MD;  Location: [facility] OR;  Service: Ophthalmology;  Laterality: Right;    CORONARY ANGIOGRAPHY N/A 3/10/2021    Procedure: ANGIOGRAM, CORONARY ARTERY - right radial;  Surgeon: Shemar Dempsey MD;  Location: [facility] CATH LAB;  Service: Cardiology;  Laterality: N/A;    CORONARY STENT PLACEMENT  03/10/2021    prox-mid RCA Marshal 4.5 x 26 mm, 4.5 x 12 mm    ESOPHAGOGASTRODUODENOSCOPY N/A 7/8/2024    Procedure: EGD (ESOPHAGOGASTRODUODENOSCOPY);  Surgeon: Janet Win MD;  Location: [facility] ENDO;  Service: Endoscopy;  Laterality: N/A;    FIXATION KYPHOPLASTY N/A 4/22/2024    Procedure: KYPHOPLASTY;  Surgeon: Stefan Stern MD;  Location: [facility] OR;  Service: Pain Management;  Laterality: N/A;  NO PROPOFOL    INCISION AND DRAINAGE OF SCROTUM N/A 11/15/2022    Procedure: INCISION AND DRAINAGE, SCROTUM;  Surgeon: William Hatch MD;  Location: [facility] OR;  Service: Urology;   Laterality: N/A;    INCISION AND DRAINAGE OF SCROTUM N/A 11/17/2022    Procedure: INCISION AND DRAINAGE, SCROTUM;  Surgeon: William Hatch MD;  Location: [facility] OR;  Service: Urology;  Laterality: N/A;    LUNG LOBECTOMY Right 2008    RUL lobectomy after removal of atrial myxoma    MAGNETIC RESONANCE IMAGING N/A 4/30/2024    Procedure: MRI (Magnetic Resonance Imagine);  Surgeon: Peace Watts;  Location: [facility];  Service: Anesthesiology;  Laterality: N/A;    PERITONEOCENTESIS N/A 7/2/2024    Procedure: PARACENTESIS, ABDOMINAL;  Surgeon: Ventura Jordan MD;  Location: [facility] CATH LAB;  Service: Radiology;  Laterality: N/A;    PLEURA BIOPSY      RESECTION OF ATRIAL MYXOMA  2007       Social History     Socioeconomic History    Marital status: Single   Occupational History    Occupation: , respiratory therapist, founded [employer]     Comment: Retired   Tobacco Use    Smoking status: Every Day     Current packs/day: 1.00     Average packs/day: 1 pack/day for 35.0 years (35.0 ttl pk-yrs)     Types: Cigarettes    Smokeless tobacco: Never   Substance and Sexual Activity    Alcohol use: Not Currently    Drug use: No    Sexual activity: Never     Social Determinants of Health     Financial Resource Strain: Low Risk  (6/29/2024)    Overall Financial Resource Strain (CARDIA)     Difficulty of Paying Living Expenses: Not very hard   Food Insecurity: No Food Insecurity (6/29/2024)    Hunger Vital Sign     Worried About Running Out of Food in the Last Year: Never true     Ran Out of Food in the Last Year: Never true   Transportation Needs: No Transportation Needs (6/29/2024)    TRANSPORTATION NEEDS     Transportation : No   Physical Activity: Inactive (6/29/2024)    Exercise Vital Sign     Days of Exercise per Week: 0 days     Minutes of Exercise per Session: 0 min   Stress: No Stress Concern Present (6/29/2024)    Citizen of [place] [employer] - Occupational Stress Questionnaire     Feeling of  Stress : Only a little   Recent Concern: Stress - Stress Concern Present (4/30/2024)    [place] [employer] - Occupational Stress Questionnaire     Feeling of Stress : To some extent   Housing Stability: Low Risk  (6/29/2024)    Housing Stability Vital Sign     Unable to Pay for Housing in the Last Year: No     Homeless in the Last Year: No       Family History   Problem Relation Name Age of Onset    Hodgkin's lymphoma Mother      Diabetes type II Mother      Kidney failure Father      Diabetes type I Father      Cancer Sister         Physical Exam     Vitals:    07/24/24 2104 07/24/24 2105 07/24/24 2117 07/24/24 2144   BP:   117/70 123/73   Pulse:  93     Resp: 18      Temp:       TempSrc:       SpO2:  97% 97% 98%     Physical Exam:   Nursing note and vitals reviewed.  Appearance:  Chronically ill, nontoxic adult male in no acute respiratory distress.  Making purposeful movements.  Speaking in full sentences.  Skin: No obvious rashes seen.  Good turgor.  No abrasions.  No ecchymoses.  Eyes: No conjunctival injection. EOMI, PERRL.  Head: NC/AT  Chest: CTAB. No increased work of breathing, bilateral chest rise.  Cardiovascular: Regular rate and rhythm.  Normal equal bilateral radial pulses.  No lower extremity edema.  Abdomen: Soft.  Not distended.  Nontender.  No guarding.  No rebound. No Masses.  No CVA tenderness bilaterally.  No tenderness to palpation in right upper quadrant.  No tenderness to palpation in left lower quadrant.  Musculoskeletal: No obvious deformities.   Neck supple, full range of motion, no obvious deformity.   No tenderness to palpation of cervical through lumbar spine.  No step-offs or deformities. Good range of motion all joints.  Neurologic: Moves all extremities.  Normal sensation.  No facial droop.  Normal speech.    Mental Status:  Alert and oriented x 3.  Appropriate, conversant.      Results and Medications    Procedures    Labs Reviewed   CBC W/ AUTO DIFFERENTIAL -  Abnormal       Result Value    WBC 5.54      RBC 3.82 (*)     Hemoglobin 11.7 (*)     Hematocrit 35.6 (*)     MCV 93      MCH 30.6      MCHC 32.9      RDW 17.8 (*)     Platelets 349      MPV 11.0      Immature Granulocytes 0.5      Gran # (ANC) 2.8      Immature Grans (Abs) 0.03      Lymph # 1.9      Mono # 0.6      Eos # 0.2      Baso # 0.10      nRBC 0      Gran % 50.3      Lymph % 33.9      Mono % 10.1      Eosinophil % 3.4      Basophil % 1.8      Differential Method Automated     COMPREHENSIVE METABOLIC PANEL - Abnormal    Sodium 136      Potassium 3.6      Chloride 106      CO2 21 (*)     Glucose 86      BUN 6 (*)     Creatinine 0.6      Calcium 7.6 (*)     Total Protein 5.6 (*)     Albumin 1.5 (*)     Total Bilirubin 1.8 (*)     Alkaline Phosphatase 719 (*)     AST 76 (*)     ALT 37      eGFR >60      Anion Gap 9     B-TYPE NATRIURETIC PEPTIDE - Abnormal    BNP 1,019 (*)    ISTAT PROCEDURE - Abnormal    POC PH 7.500 (*)     POC PCO2 32.4 (*)     POC PO2 34 (*)     POC HCO3 25.2      POC BE 2      POC SATURATED O2 72      POC Sodium 136      POC Potassium 4.1      POC TCO2 26      POC Ionized Calcium 0.95 (*)     POC Hematocrit 33 (*)     POC HEMOGLOBIN 11      Sample VENOUS     TROPONIN I    Troponin I 0.010     MAGNESIUM    Magnesium 1.8     SARS-COV-2 RDRP GENE    POC Rapid COVID Negative       Acceptable Yes     POCT GLUCOSE MONITORING CONTINUOUS       Imaging Results              X-Ray Chest AP Portable (Final result)  Result time 07/24/24 21:02:26      Final result by Latanya Liz MD (07/24/24 21:02:26)                   Impression:      No acute cardiopulmonary process identified.  No significant change.      Electronically signed by: Latanya Liz MD  Date:    07/24/2024  Time:    21:02               Narrative:    EXAMINATION:  XR CHEST AP PORTABLE    CLINICAL HISTORY:  CHF;    TECHNIQUE:  Single frontal view of the chest was  performed.    COMPARISON:  06/28/2024.    FINDINGS:  Cardiac silhouette is normal in size.  Lungs are symmetrically expanded.  There is unchanged nonspecific interstitial prominence and scattered areas of suspected scarring.  No evidence of new focal consolidative process, pneumothorax, or large pleural effusion.  No acute osseous abnormality identified.                                          Medications   sodium chloride 0.9% bolus 500 mL 500 mL (0 mLs Intravenous Stopped 7/24/24 2211)       MDM, Impression and Plan   Previous Records:   I decided to obtain old medical records which is listed here or in ED course:  CT abdomen and pelvis from recent admission concerning for colitis.  His MRCP was negative for obstruction    Independently Interpreted Test(s):   EKG:  I independently reviewed and interpreted the EKG and my findings are as follows:   Detailed here or in ED course.   IMAGING:  I have ordered and independently interpreted X-rays and my findings are as follow:  Detailed here or in ED course.  Chest x-ray without large pleural effusion, pneumonia pneumothorax.    Clinical Tests:   Lab Tests: Ordered and Reviewed  Radiological Study: Ordered and Reviewed  Medical Tests: Ordered and Reviewed    Differential diagnosis:  -arrhythmia   -electrolyte abnormalities   -CHF   -unlikely ACS based off complaints and physical exam   -dehydration     Initial Assessment & ED Management:    Urgent evaluation of 68 y.o. male with History as above who presents the ED with chief complaint of lightheadedness and dehydration.  He received 200 cc of fluid by EMS.  Given 500 cc in the ED.  He noted reduction of his lightheadedness and states that he feels much better.  His COVID was noted to be negative.  EKG and troponin making ACS unlikely.  Magnesium within normal limits.  CMP grossly unrevealing in his hemoglobin is near baseline.  His repeat abdominal exam is unremarkable.  He does have an elevated BNP yet is not  desaturating on ambulatory pulse ox monitoring.  He states that he overall feels better and he was requesting to go home.  He would like more IV fluids yet I do not believe that this would be beneficial with his elevated BNP.  He was monitored on telemetry and had no noted SVT or dangerous arrhythmia.  I offered patient admission for observation yet he states he would like to go home.  As patient states he would like to go home I message his hospital medicine doctor and informed him of the plan for discharge, I do believe he would benefit from repeat lab work including a BNP in the next 2-3 days.  Patient states that if he was to be short of breath, lightheaded, have worsening abdominal pain or intractable nausea or vomiting he will return to the ED for evaluation.  I instructed him to take his home Lasix.  Care plan addressed with patient and all those present. All questions answered.  Strict return precautions discussed.  Patient was instructed on the correct follow-up time and route.  They voiced verbal understanding and agreement  with the plan and were deemed stable for discharge.       Medical Decision Making  Amount and/or Complexity of Data Reviewed  Labs: ordered.  Radiology: ordered.             Please see ED course for discussion of workup and dispo if not listed above.          Final diagnoses:  [R42] Dizzy  [R06.02] Shortness of breath  [R94.31] T wave inversion in EKG  [R42] Lightheadedness (Primary)        ED Disposition Condition    Discharge Stable          ED Prescriptions    None       Follow-up Information       Follow up With Specialties Details Why Contact Info    Tushar Drew MD Internal Medicine In 2 days  [address] [facility] [address]  [phone]      [facility] Emergency Dept Emergency Medicine  If symptoms worsen [address] [facility] [address]  [phone]            ED Course as of 07/25/24 0016   Wed Jul 24, 2024 2028 Echo on 4 03/2024 shows  Left  Ventricle: The left ventricle is normal in size. Increased wall thickness. There is concentric remodeling. There is normal systolic function with a visually estimated ejection fraction of 55 - 60%. Grade I diastolic dysfunction.  ·  Right Ventricle: Normal right ventricular cavity size. Wall thickness is normal. Right ventricle wall motion  is normal. Systolic function is yony   []   2028 EKG shows normal sinus rhythm with ventricular rate of 90 beats per minute.  Noted T-wave inversions in V2, V3 V4.  No acute signs of ischemia. []   2205 Total of 700 nacl    []      ED Course User Index  [HM] Tia Brandt MD Heyward Mack, MD Mack, Heyward B, MD  07/25/24 0016

## 2024-07-25 NOTE — DISCHARGE INSTRUCTIONS
Diagnosis:   1. Lightheadedness    2. Dizzy    3. Shortness of breath    4. T wave inversion in EKG        Tests you had showed:   Labs Reviewed   CBC W/ AUTO DIFFERENTIAL - Abnormal       Result Value    WBC 5.54      RBC 3.82 (*)     Hemoglobin 11.7 (*)     Hematocrit 35.6 (*)     MCV 93      MCH 30.6      MCHC 32.9      RDW 17.8 (*)     Platelets 349      MPV 11.0      Immature Granulocytes 0.5      Gran # (ANC) 2.8      Immature Grans (Abs) 0.03      Lymph # 1.9      Mono # 0.6      Eos # 0.2      Baso # 0.10      nRBC 0      Gran % 50.3      Lymph % 33.9      Mono % 10.1      Eosinophil % 3.4      Basophil % 1.8      Differential Method Automated     COMPREHENSIVE METABOLIC PANEL - Abnormal    Sodium 136      Potassium 3.6      Chloride 106      CO2 21 (*)     Glucose 86      BUN 6 (*)     Creatinine 0.6      Calcium 7.6 (*)     Total Protein 5.6 (*)     Albumin 1.5 (*)     Total Bilirubin 1.8 (*)     Alkaline Phosphatase 719 (*)     AST 76 (*)     ALT 37      eGFR >60      Anion Gap 9     B-TYPE NATRIURETIC PEPTIDE - Abnormal    BNP 1,019 (*)    ISTAT PROCEDURE - Abnormal    POC PH 7.500 (*)     POC PCO2 32.4 (*)     POC PO2 34 (*)     POC HCO3 25.2      POC BE 2      POC SATURATED O2 72      POC Sodium 136      POC Potassium 4.1      POC TCO2 26      POC Ionized Calcium 0.95 (*)     POC Hematocrit 33 (*)     POC HEMOGLOBIN 11      Sample VENOUS     TROPONIN I    Troponin I 0.010     MAGNESIUM    Magnesium 1.8     SARS-COV-2 RDRP GENE    POC Rapid COVID Negative       Acceptable Yes     POCT GLUCOSE MONITORING CONTINUOUS      X-Ray Chest AP Portable   Final Result      No acute cardiopulmonary process identified.  No significant change.         Electronically signed by: Latanya Liz MD   Date:    07/24/2024   Time:    21:02          Treatments you received were:   Medications   sodium chloride 0.9% bolus 500 mL 500 mL (0 mLs Intravenous Stopped 7/24/24 2211)       Home Care Instructions:  -  Medications: Continue taking your home medications as prescribed    Follow-Up Plan:  - Follow-up with: Primary care doctor within 1-2  days  - Additional testing and/or evaluation will be directed by your primary doctor    Return to the Emergency Department for symptoms including but not limited to: worsening symptoms, severe back pain, shortness of breath or chest pain, vomiting with inability to hold down fluids, blood in vomit or poop, fevers greater than 100.4°F, passing out/fainting/unconsciousness, or other concerning symptoms.     Future Appointments   Date Time Provider Department Center   8/26/2024  1:40 PM Rhianna Wagner MD [facility] [place]   9/30/2024  3:00 PM Izabel Marc NP [facility] [place]

## 2024-07-25 NOTE — ED NOTES
SPD unit #[id number] has arrived for discharge transportation. Patient has been changed into his personal clothing without incident.

## 2024-07-25 NOTE — ED NOTES
Ambulatory sat of 94 %. MD notified. Patient denies SOB. Plan of care given. Pt verbal/understanding.

## 2024-07-26 ENCOUNTER — PATIENT OUTREACH (OUTPATIENT)
Dept: EMERGENCY MEDICINE | Facility: HOSPITAL | Age: 69
End: 2024-07-26

## 2024-07-29 ENCOUNTER — HOSPITAL ENCOUNTER (EMERGENCY)
Facility: OTHER | Age: 69
Discharge: HOME | End: 2024-07-29
Attending: EMERGENCY MEDICINE
Payer: MEDICARE

## 2024-07-29 VITALS
HEART RATE: 86 BPM | TEMPERATURE: 98 F | RESPIRATION RATE: 19 BRPM | OXYGEN SATURATION: 96 % | SYSTOLIC BLOOD PRESSURE: 124 MMHG | DIASTOLIC BLOOD PRESSURE: 75 MMHG

## 2024-07-29 DIAGNOSIS — R60.0 EDEMA OF BOTH UPPER EXTREMITIES: ICD-10-CM

## 2024-07-29 DIAGNOSIS — R60.0 BILATERAL LOWER EXTREMITY EDEMA: ICD-10-CM

## 2024-07-29 DIAGNOSIS — R53.82 CHRONIC FATIGUE: ICD-10-CM

## 2024-07-29 DIAGNOSIS — R53.1 GENERALIZED WEAKNESS: Primary | ICD-10-CM

## 2024-07-29 DIAGNOSIS — R53.1 WEAKNESS: ICD-10-CM

## 2024-07-29 LAB
ALBUMIN SERPL BCP-MCNC: 1.4 G/DL (ref 3.5–5.2)
ALP SERPL-CCNC: 786 U/L (ref 55–135)
ALT SERPL W/O P-5'-P-CCNC: 34 U/L (ref 10–44)
ANION GAP SERPL CALC-SCNC: 8 MMOL/L (ref 8–16)
AST SERPL-CCNC: 77 U/L (ref 10–40)
BASOPHILS # BLD AUTO: 0.1 K/UL (ref 0–0.2)
BASOPHILS NFR BLD: 1.9 % (ref 0–1.9)
BILIRUB SERPL-MCNC: 1.8 MG/DL (ref 0.1–1)
BILIRUB UR QL STRIP: NEGATIVE
BNP SERPL-MCNC: 1103 PG/ML (ref 0–99)
BUN SERPL-MCNC: 6 MG/DL (ref 8–23)
CALCIUM SERPL-MCNC: 7.5 MG/DL (ref 8.7–10.5)
CHLORIDE SERPL-SCNC: 108 MMOL/L (ref 95–110)
CK SERPL-CCNC: 25 U/L (ref 20–200)
CLARITY UR: CLEAR
CO2 SERPL-SCNC: 21 MMOL/L (ref 23–29)
COLOR UR: YELLOW
CREAT SERPL-MCNC: 0.6 MG/DL (ref 0.5–1.4)
CTP QC/QA: YES
CTP QC/QA: YES
DIFFERENTIAL METHOD BLD: ABNORMAL
DIGOXIN SERPL-MCNC: 0.2 NG/ML (ref 0.8–2)
EOSINOPHIL # BLD AUTO: 0.2 K/UL (ref 0–0.5)
EOSINOPHIL NFR BLD: 3 % (ref 0–8)
ERYTHROCYTE [DISTWIDTH] IN BLOOD BY AUTOMATED COUNT: 17.1 % (ref 11.5–14.5)
EST. GFR  (NO RACE VARIABLE): >60 ML/MIN/1.73 M^2
GLUCOSE SERPL-MCNC: 100 MG/DL (ref 70–110)
GLUCOSE UR QL STRIP: NEGATIVE
HCT VFR BLD AUTO: 34.1 % (ref 40–54)
HGB BLD-MCNC: 11.1 G/DL (ref 14–18)
HGB UR QL STRIP: NEGATIVE
IMM GRANULOCYTES # BLD AUTO: 0.01 K/UL (ref 0–0.04)
IMM GRANULOCYTES NFR BLD AUTO: 0.2 % (ref 0–0.5)
KETONES UR QL STRIP: NEGATIVE
LACTATE SERPL-SCNC: 1.4 MMOL/L (ref 0.5–2.2)
LEUKOCYTE ESTERASE UR QL STRIP: NEGATIVE
LIPASE SERPL-CCNC: 3 U/L (ref 4–60)
LYMPHOCYTES # BLD AUTO: 1.8 K/UL (ref 1–4.8)
LYMPHOCYTES NFR BLD: 34.6 % (ref 18–48)
MAGNESIUM SERPL-MCNC: 1.6 MG/DL (ref 1.6–2.6)
MCH RBC QN AUTO: 30.2 PG (ref 27–31)
MCHC RBC AUTO-ENTMCNC: 32.6 G/DL (ref 32–36)
MCV RBC AUTO: 93 FL (ref 82–98)
MONOCYTES # BLD AUTO: 0.6 K/UL (ref 0.3–1)
MONOCYTES NFR BLD: 10.8 % (ref 4–15)
NEUTROPHILS # BLD AUTO: 2.6 K/UL (ref 1.8–7.7)
NEUTROPHILS NFR BLD: 49.5 % (ref 38–73)
NITRITE UR QL STRIP: NEGATIVE
NRBC BLD-RTO: 0 /100 WBC
PH UR STRIP: 7 [PH] (ref 5–8)
PLATELET # BLD AUTO: 230 K/UL (ref 150–450)
PMV BLD AUTO: 10.6 FL (ref 9.2–12.9)
POC MOLECULAR INFLUENZA A AGN: NEGATIVE
POC MOLECULAR INFLUENZA B AGN: NEGATIVE
POCT GLUCOSE: 105 MG/DL (ref 70–110)
POTASSIUM SERPL-SCNC: 3.8 MMOL/L (ref 3.5–5.1)
PROT SERPL-MCNC: 5.5 G/DL (ref 6–8.4)
PROT UR QL STRIP: NEGATIVE
RBC # BLD AUTO: 3.67 M/UL (ref 4.6–6.2)
SARS-COV-2 RDRP RESP QL NAA+PROBE: NEGATIVE
SODIUM SERPL-SCNC: 137 MMOL/L (ref 136–145)
SP GR UR STRIP: 1.02 (ref 1–1.03)
TROPONIN I SERPL DL<=0.01 NG/ML-MCNC: 0.02 NG/ML (ref 0–0.03)
TSH SERPL DL<=0.005 MIU/L-ACNC: 2.09 UIU/ML (ref 0.4–4)
URN SPEC COLLECT METH UR: ABNORMAL
UROBILINOGEN UR STRIP-ACNC: >=8 EU/DL
WBC # BLD AUTO: 5.29 K/UL (ref 3.9–12.7)

## 2024-07-29 PROCEDURE — 25000003 PHARM REV CODE 250: Performed by: EMERGENCY MEDICINE

## 2024-07-29 PROCEDURE — 82550 ASSAY OF CK (CPK): CPT | Performed by: EMERGENCY MEDICINE

## 2024-07-29 PROCEDURE — 93005 ELECTROCARDIOGRAM TRACING: CPT

## 2024-07-29 PROCEDURE — 83605 ASSAY OF LACTIC ACID: CPT | Performed by: EMERGENCY MEDICINE

## 2024-07-29 PROCEDURE — 83735 ASSAY OF MAGNESIUM: CPT | Performed by: EMERGENCY MEDICINE

## 2024-07-29 PROCEDURE — 82962 GLUCOSE BLOOD TEST: CPT

## 2024-07-29 PROCEDURE — 93010 ELECTROCARDIOGRAM REPORT: CPT | Mod: ,,, | Performed by: INTERNAL MEDICINE

## 2024-07-29 PROCEDURE — 99285 EMERGENCY DEPT VISIT HI MDM: CPT | Mod: 25

## 2024-07-29 PROCEDURE — 96374 THER/PROPH/DIAG INJ IV PUSH: CPT

## 2024-07-29 PROCEDURE — 25500020 PHARM REV CODE 255: Performed by: EMERGENCY MEDICINE

## 2024-07-29 PROCEDURE — 81003 URINALYSIS AUTO W/O SCOPE: CPT | Performed by: EMERGENCY MEDICINE

## 2024-07-29 PROCEDURE — 80162 ASSAY OF DIGOXIN TOTAL: CPT | Performed by: EMERGENCY MEDICINE

## 2024-07-29 PROCEDURE — 63600175 PHARM REV CODE 636 W HCPCS: Performed by: EMERGENCY MEDICINE

## 2024-07-29 PROCEDURE — 80053 COMPREHEN METABOLIC PANEL: CPT | Performed by: EMERGENCY MEDICINE

## 2024-07-29 PROCEDURE — 85025 COMPLETE CBC W/AUTO DIFF WBC: CPT | Performed by: EMERGENCY MEDICINE

## 2024-07-29 PROCEDURE — 83690 ASSAY OF LIPASE: CPT | Performed by: EMERGENCY MEDICINE

## 2024-07-29 PROCEDURE — 96361 HYDRATE IV INFUSION ADD-ON: CPT

## 2024-07-29 PROCEDURE — 84484 ASSAY OF TROPONIN QUANT: CPT | Performed by: EMERGENCY MEDICINE

## 2024-07-29 PROCEDURE — 84443 ASSAY THYROID STIM HORMONE: CPT | Performed by: EMERGENCY MEDICINE

## 2024-07-29 PROCEDURE — 87635 SARS-COV-2 COVID-19 AMP PRB: CPT | Performed by: EMERGENCY MEDICINE

## 2024-07-29 PROCEDURE — 83880 ASSAY OF NATRIURETIC PEPTIDE: CPT | Performed by: EMERGENCY MEDICINE

## 2024-07-29 RX ORDER — FUROSEMIDE 10 MG/ML
40 INJECTION INTRAMUSCULAR; INTRAVENOUS
Status: COMPLETED | OUTPATIENT
Start: 2024-07-29 | End: 2024-07-29

## 2024-07-29 RX ADMIN — SODIUM CHLORIDE 1000 ML: 9 INJECTION, SOLUTION INTRAVENOUS at 04:07

## 2024-07-29 RX ADMIN — IOHEXOL 100 ML: 350 INJECTION, SOLUTION INTRAVENOUS at 04:07

## 2024-07-29 RX ADMIN — FUROSEMIDE 40 MG: 10 INJECTION, SOLUTION INTRAMUSCULAR; INTRAVENOUS at 06:07

## 2024-07-29 NOTE — ED PROVIDER NOTES
Encounter Date: 7/29/2024       History     Chief Complaint   Patient presents with    Fatigue     Here for weakness and decreased oral intake       68-year-old male presents via EMS to [facility] with decreased oral intake and fatigue, ongoing for 1 week.  Patient has chronic back pain for which he is prescribed narcotics.  He has acute on chronic swelling of all extremities.  He has mid abdominal pain with eating.  Patient reports nausea without vomiting.  He last ate earlier on Sunday 7/28/24.    Patient has multiple presentations for similar, last 4 days ago (7/24/24).  He was admitted here 6/28/24-7/14/24.  CT at that time showed colitis.  MRCP showed no obstruction.  Patient was noted to have abdominal ascites and underwent paracentesis.  EGD showed Candida esophagitis for which patenit was started on Fluconazole.  Patient completed 7 days of cipro/flagyl during this visit.      Patient's nephew was at bedside.  He reports patient lives with family.    PMH: HTN, diastolic heart failure, SVT, DM2, rheumatoid arthritis, nonalcoholic fatty liver disease, chronic back pain, hep B on lamivudine, hyponatremia, s/p s/p T11 and L3 kyphoplasty on 4/22/2024     TTE 4/3/24  ·  Left Ventricle: The left ventricle is normal in size. Increased wall thickness. There is concentric remodeling. There is normal systolic function with a visually estimated ejection fraction of 55 - 60%. Grade I diastolic dysfunction.  ·  Right Ventricle: Normal right ventricular cavity size. Wall thickness is normal. Right ventricle wall motion  is normal. Systolic function is normal.        Review of patient's allergies indicates:   Allergen Reactions    Enbrel [etanercept] Shortness Of Breath     CHF    Nsaids (non-steroidal anti-inflammatory drug) Other (See Comments)     hypertention    Other reaction(s): Other (See Comments)    HTN    Patient states he tolerates anti inflammatory eye drops    Statins-hmg-coa reductase inhibitors Other  (See Comments)     Heart arrythemias    Other reaction(s): Other (See Comments)    Joint pain and cardiac arrythmias    Pcn [penicillins] Rash     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    forehead    Stroke     TIA     Past Surgical History:   Procedure Laterality Date    CATARACT EXTRACTION W/  INTRAOCULAR LENS IMPLANT Right 3/28/2024    Procedure: EXTRACTION, CATARACT, WITH IOL INSERTION;  Surgeon: Hans Woodward MD;  Location: [facility] OR;  Service: Ophthalmology;  Laterality: Right;    CORONARY ANGIOGRAPHY N/A 3/10/2021    Procedure: ANGIOGRAM, CORONARY ARTERY - right radial;  Surgeon: Shemar Dempsey MD;  Location: [facility] CATH LAB;  Service: Cardiology;  Laterality: N/A;    CORONARY STENT PLACEMENT  03/10/2021    prox-mid RCA Marshal 4.5 x 26 mm, 4.5 x 12 mm    ESOPHAGOGASTRODUODENOSCOPY N/A 7/8/2024    Procedure: EGD (ESOPHAGOGASTRODUODENOSCOPY);  Surgeon: Janet Win MD;  Location: [facility] ENDO;  Service: Endoscopy;  Laterality: N/A;    FIXATION KYPHOPLASTY N/A 4/22/2024    Procedure: KYPHOPLASTY;  Surgeon: Stefan Stern MD;  Location: [facility] OR;  Service: Pain Management;  Laterality: N/A;  NO PROPOFOL    INCISION AND DRAINAGE OF SCROTUM N/A 11/15/2022    Procedure: INCISION AND DRAINAGE, SCROTUM;  Surgeon: William Hatch MD;  Location: [facility] OR;  Service: Urology;  Laterality: N/A;    INCISION AND DRAINAGE OF SCROTUM N/A 11/17/2022    Procedure: INCISION AND DRAINAGE, SCROTUM;  Surgeon: William Hatch MD;  Location: [facility];  Service: Urology;  Laterality: N/A;    LUNG LOBECTOMY Right 2008    RUL lobectomy after  removal of atrial myxoma    MAGNETIC RESONANCE IMAGING N/A 4/30/2024    Procedure: MRI (Magnetic Resonance Imagine);  Surgeon: Peace Watts;  Location: [facility];  Service: Anesthesiology;  Laterality: N/A;    PERITONEOCENTESIS N/A 7/2/2024    Procedure: PARACENTESIS, ABDOMINAL;  Surgeon: Ventura Jordan MD;  Location: [facility] CATH LAB;  Service: Radiology;  Laterality: N/A;    PLEURA BIOPSY      RESECTION OF ATRIAL MYXOMA  2007     Family History   Problem Relation Name Age of Onset    Hodgkin's lymphoma Mother      Diabetes type II Mother      Kidney failure Father      Diabetes type I Father      Cancer Sister       Social History     Tobacco Use    Smoking status: Every Day     Current packs/day: 1.00     Average packs/day: 1 pack/day for 35.0 years (35.0 ttl pk-yrs)     Types: Cigarettes    Smokeless tobacco: Never   Substance Use Topics    Alcohol use: Not Currently    Drug use: No     Review of Systems   Constitutional:  Positive for fatigue. Negative for fever.   HENT:  Negative for rhinorrhea.    Eyes:  Negative for visual disturbance.   Respiratory:  Negative for shortness of breath.    Cardiovascular:  Negative for chest pain.   Gastrointestinal:  Positive for abdominal pain and nausea. Negative for vomiting.   Genitourinary:  Negative for dysuria.   Musculoskeletal:  Positive for back pain.   Skin:  Positive for wound (Left upper extremity).   Neurological:  Negative for syncope and weakness.       Physical Exam     Initial Vitals [07/29/24 0210]   BP Pulse Resp Temp SpO2   121/76 105 15 98.2 °F (36.8 °C) (!) 94 %      MAP       --         Physical Exam    Nursing note and vitals reviewed.  Constitutional: He appears well-developed and well-nourished. He is not diaphoretic.   Chronically ill-appearing.  Sleeping, rouses to voice.   HENT:   Head: Normocephalic and atraumatic.   Dry oropharynx.   Eyes: Conjunctivae and EOM are normal. Pupils are equal, round, and reactive to light.   Neck: Neck supple.    Normal range of motion.  Cardiovascular:  Normal rate, regular rhythm and intact distal pulses.           Pulmonary/Chest: Breath sounds normal. No respiratory distress. He has no wheezes. He has no rhonchi. He has no rales.   Abdominal: Abdomen is soft. There is no abdominal tenderness.   Patient initially reported abdominal tenderness during exam.  However, when examined while sleeping, his abdomen was soft, and he did not have withdrawal to abdominal palpation. There is no rebound and no guarding.   Musculoskeletal:         General: Edema present. No tenderness. Normal range of motion.      Cervical back: Normal range of motion and neck supple.      Comments: 2+ bilateral lower extremity edema, 1+ bilateral upper extremity edema.     Neurological: He is alert and oriented to person, place, and time.   Moving all extremities   Skin: Skin is warm and dry.   Scab to left forearm dorsum 4x4cm.  No purulent drainage.  No surrounding erythema.   Psychiatric: His behavior is normal.         ED Course   Procedures  Labs Reviewed   COMPREHENSIVE METABOLIC PANEL - Abnormal       Result Value    Sodium 137      Potassium 3.8      Chloride 108      CO2 21 (*)     Glucose 100      BUN 6 (*)     Creatinine 0.6      Calcium 7.5 (*)     Total Protein 5.5 (*)     Albumin 1.4 (*)     Total Bilirubin 1.8 (*)     Alkaline Phosphatase 786 (*)     AST 77 (*)     ALT 34      eGFR >60      Anion Gap 8     CBC W/ AUTO DIFFERENTIAL - Abnormal    WBC 5.29      RBC 3.67 (*)     Hemoglobin 11.1 (*)     Hematocrit 34.1 (*)     MCV 93      MCH 30.2      MCHC 32.6      RDW 17.1 (*)     Platelets 230      MPV 10.6      Immature Granulocytes 0.2      Gran # (ANC) 2.6      Immature Grans (Abs) 0.01      Lymph # 1.8      Mono # 0.6      Eos # 0.2      Baso # 0.10      nRBC 0      Gran % 49.5      Lymph % 34.6      Mono % 10.8      Eosinophil % 3.0      Basophil % 1.9      Differential Method Automated     B-TYPE NATRIURETIC PEPTIDE - Abnormal     BNP 1,103 (*)    LIPASE - Abnormal    Lipase 3 (*)    URINALYSIS, REFLEX TO URINE CULTURE - Abnormal    Specimen UA Urine, Clean Catch      Color, UA Yellow      Appearance, UA Clear      pH, UA 7.0      Specific Gravity, UA 1.020      Protein, UA Negative      Glucose, UA Negative      Ketones, UA Negative      Bilirubin (UA) Negative      Occult Blood UA Negative      Nitrite, UA Negative      Urobilinogen, UA >=8.0 (*)     Leukocytes, UA Negative      Narrative:     Specimen Source->Urine   DIGOXIN LEVEL - Abnormal    Digoxin Lvl 0.2 (*)    MAGNESIUM    Magnesium 1.6     TROPONIN I    Troponin I 0.023     TSH    TSH 2.094     LACTIC ACID, PLASMA    Lactate (Lactic Acid) 1.4     CK    CPK 25     SARS-COV-2 RDRP GENE    POC Rapid COVID Negative       Acceptable Yes     POCT INFLUENZA A/B MOLECULAR    POC Molecular Influenza A Ag Negative      POC Molecular Influenza B Ag Negative       Acceptable Yes     POCT GLUCOSE    POCT Glucose 105       EKG Readings: (Independently Interpreted)   02:40: NSR, HR 91. Normal axis. No ectopy. TWI in V3, V4, V5, V6. No ectopy. No STEMI.        Imaging Results               CT Abdomen Pelvis With IV Contrast NO Oral Contrast (Final result)  Result time 07/29/24 06:34:16      Final result by Luis Moore MD (07/29/24 06:34:16)                   Impression:      There is no large central saddle type pulmonary embolus, when accounting for limitations of the examination there is no additional evidence for pulmonary embolus on this examination.    Small pleural effusion on the left, minimal pleural fluid on the right, there is no evidence for loculated pleural fluid.    Previously identified pulmonary nodular opacity of the right upper lobe posteriorly, as well as at the right lung base noted and appears stable.    Focal area of opacity of the peripheral right middle lobe may relate to focal area of irregular infiltrate.    Increased attenuation of  the interstitial markings and interlobular septal may relate to a pattern of interstitial thickening/edema/infiltrate.    Patchy nodular opacities involving the left upper lobe and left lower lobe may relate to patchy nodular infiltrates.    Additional chronic and postoperative lung changes are noted.    Moderate gallbladder distention, there is no evidence for pericholecystic inflammatory change.    Prominent diminished attenuation of the liver likely represents diffuse fatty infiltrate.    Indeterminate lesion at the upper pole of the right kidney, ultrasound follow-up is recommended.    Findings referable to the colon, particularly the right colon for which correlation for colitis is needed.    Findings referable to the distal small bowel loops as discussed above may relate to ileitis, there is no small bowel obstruction.    Recent L1 vertebral body fracture deformity again noted, with progression and increasing vertebral body height loss.    Additional findings as above.    This report was flagged in Epic as abnormal.      Electronically signed by: Luis Moore  Date:    07/29/2024  Time:    06:34               Narrative:    EXAMINATION:  CTA CHEST NON CORONARY (PE STUDIES); CT ABDOMEN PELVIS WITH IV CONTRAST    CLINICAL HISTORY:  Pulmonary embolism (PE) suspected, high prob;; Nausea/vomiting;    TECHNIQUE:  CT examination of the chest abdomen pelvis was performed following the IV administration of 100 mL of Omnipaque 350.  Axial imaging of the chest was performed via pulmonary arterial angiographic protocol.  Axial imaging, sagittal and coronal reconstruction imaging of the chest as well as axial MIP reconstruction imaging is submitted.  Single phase postcontrast CT examination of the abdomen and pelvis is submitted, oral contrast was not utilized.    COMPARISON:  CT examination of the abdomen and pelvis June 27, 2024, CT examination of the chest April 28, 2024, chest radiograph July 29,  2024    FINDINGS:  There is artifact including motion artifact present, this diminishes the sensitivity of the examination.    There is no large central saddle type pulmonary embolus, the pulmonary arterial vascular demonstrate artifact however when accounting for our to fact an abnormal pattern of pulmonary arterial filling defects specific for pulmonary emboli is not seen.    There are several small to mildly prominent mediastinal and hilar lymph nodes noted.  These may be reactive.  Coronary arterial atherosclerotic calcification is noted.  The thoracic aorta demonstrates atherosclerotic change including atherosclerotic calcification, demonstrates appropriate opacification.  There is no pericardial effusion.    Previously identified nodular density of the right upper lobe with extension to the major fissure is again noted, as seen on series 4 axial images 233-244, stable, less prominent.    Previously identified opacity at the right lung base posteriorly may relate to an area of scarring and or atelectasis noted, as seen on series 4, image 402, appears stable.    There are several pulmonary micro nodules again noted.  Chronic/postoperative change of the right upper lobe anteriorly again noted.  Associated irregular opacity again noted, mildly less dense.  Mild focal area of opacity of the peripheral right middle lobe may relate to a focal area of irregular infiltrate.  There is a pattern of increased accentuation of interstitial and interlobular septa, this may relate to a pattern of interstitial thickening/infiltrate/edema.  Mild patchy nodular opacities involving the left upper lobe and left lower lobe may relate to patchy nodular infiltrates.  There are atelectatic changes bilaterally.  There is a small pleural effusion on the left and minimal pleural fluid on the right.  There is no loculated pleural fluid seen.  There is no evidence for pneumothorax.    The stomach demonstrates nonspecific appearance of mild  fluid and air within the gastric lumen.  There is moderate gallbladder distention.  There is no evidence for pericholecystic or peripancreatic inflammatory change.  There is no abnormal pancreatic or biliary ductal dilatation.  Diminished attenuation of the liver consistent with diffuse fatty infiltrate is soon is noted.  The spleen appears unremarkable.  There is mild thickening and nodularity of the adrenal glands, appearing stable.    There is an exophytic lesion at the upper pole of the right kidney measuring approximately 2 cm in size, approximately 31 Hounsfield units, cannot be characterized as a cyst, ultrasound follow-up is recommended.  Bilateral nonobstructing nephrolithiasis is noted.  There is no evidence for ureteral calculus or obstructive uropathy bilaterally.    The arterial vascular structures including the aorta demonstrate atherosclerotic change.  The abdominal aorta demonstrates appropriate opacification.  The urinary bladder appears unremarkable for degree of distention.  Mild calcifications/mineralization is noted associated with the prostate.    Evaluation of the bowel is limited due to motion artifact and the lack of oral contrast.  The appendix is not definitively identified.  There is appearance of circumferential wall and fold thickening associated with the right colon with pericolonic edema/inflammation, correlation for colitis is needed.  Mild wall thickening suggested along the transverse colon, without significant inflammatory change.  Several of the distal small bowel loops appear mildly prominent, extending to the terminal ileum with suggestion of mild wall and fold thickening, correlation for ileitis is needed.  There is no evidence for small bowel obstructive process.  There is no evidence for free intraperitoneal air.    The osseous structures demonstrate prominent chronic change.  There is diminished mineralization and there is degenerative change.  There are areas of a previous  vertebroplasty/kyphoplasty noted including the T11 and L3 levels, with associated vertebral body height loss.    Previously identified fracture deformity involving the L1 vertebral level is again noted, this is noted on the prior abdominopelvic CT of June 27, 2024 as well as June 2nd, 2024, there appears to be progression with mild increased scree of vertebral body height loss.  There is mild retropulsion without high-grade spinal canal stenosis.  There is no evidence for posterior element fracture involvement.                                        CTA Chest Non-Coronary (PE Studies) (Final result)  Result time 07/29/24 06:34:16      Final result by Luis Moore MD (07/29/24 06:34:16)                   Impression:      There is no large central saddle type pulmonary embolus, when accounting for limitations of the examination there is no additional evidence for pulmonary embolus on this examination.    Small pleural effusion on the left, minimal pleural fluid on the right, there is no evidence for loculated pleural fluid.    Previously identified pulmonary nodular opacity of the right upper lobe posteriorly, as well as at the right lung base noted and appears stable.    Focal area of opacity of the peripheral right middle lobe may relate to focal area of irregular infiltrate.    Increased attenuation of the interstitial markings and interlobular septal may relate to a pattern of interstitial thickening/edema/infiltrate.    Patchy nodular opacities involving the left upper lobe and left lower lobe may relate to patchy nodular infiltrates.    Additional chronic and postoperative lung changes are noted.    Moderate gallbladder distention, there is no evidence for pericholecystic inflammatory change.    Prominent diminished attenuation of the liver likely represents diffuse fatty infiltrate.    Indeterminate lesion at the upper pole of the right kidney, ultrasound follow-up is recommended.    Findings referable  to the colon, particularly the right colon for which correlation for colitis is needed.    Findings referable to the distal small bowel loops as discussed above may relate to ileitis, there is no small bowel obstruction.    Recent L1 vertebral body fracture deformity again noted, with progression and increasing vertebral body height loss.    Additional findings as above.    This report was flagged in Epic as abnormal.      Electronically signed by: Luis Moore  Date:    07/29/2024  Time:    06:34               Narrative:    EXAMINATION:  CTA CHEST NON CORONARY (PE STUDIES); CT ABDOMEN PELVIS WITH IV CONTRAST    CLINICAL HISTORY:  Pulmonary embolism (PE) suspected, high prob;; Nausea/vomiting;    TECHNIQUE:  CT examination of the chest abdomen pelvis was performed following the IV administration of 100 mL of Omnipaque 350.  Axial imaging of the chest was performed via pulmonary arterial angiographic protocol.  Axial imaging, sagittal and coronal reconstruction imaging of the chest as well as axial MIP reconstruction imaging is submitted.  Single phase postcontrast CT examination of the abdomen and pelvis is submitted, oral contrast was not utilized.    COMPARISON:  CT examination of the abdomen and pelvis June 27, 2024, CT examination of the chest April 28, 2024, chest radiograph July 29, 2024    FINDINGS:  There is artifact including motion artifact present, this diminishes the sensitivity of the examination.    There is no large central saddle type pulmonary embolus, the pulmonary arterial vascular demonstrate artifact however when accounting for our to fact an abnormal pattern of pulmonary arterial filling defects specific for pulmonary emboli is not seen.    There are several small to mildly prominent mediastinal and hilar lymph nodes noted.  These may be reactive.  Coronary arterial atherosclerotic calcification is noted.  The thoracic aorta demonstrates atherosclerotic change including atherosclerotic  calcification, demonstrates appropriate opacification.  There is no pericardial effusion.    Previously identified nodular density of the right upper lobe with extension to the major fissure is again noted, as seen on series 4 axial images 233-244, stable, less prominent.    Previously identified opacity at the right lung base posteriorly may relate to an area of scarring and or atelectasis noted, as seen on series 4, image 402, appears stable.    There are several pulmonary micro nodules again noted.  Chronic/postoperative change of the right upper lobe anteriorly again noted.  Associated irregular opacity again noted, mildly less dense.  Mild focal area of opacity of the peripheral right middle lobe may relate to a focal area of irregular infiltrate.  There is a pattern of increased accentuation of interstitial and interlobular septa, this may relate to a pattern of interstitial thickening/infiltrate/edema.  Mild patchy nodular opacities involving the left upper lobe and left lower lobe may relate to patchy nodular infiltrates.  There are atelectatic changes bilaterally.  There is a small pleural effusion on the left and minimal pleural fluid on the right.  There is no loculated pleural fluid seen.  There is no evidence for pneumothorax.    The stomach demonstrates nonspecific appearance of mild fluid and air within the gastric lumen.  There is moderate gallbladder distention.  There is no evidence for pericholecystic or peripancreatic inflammatory change.  There is no abnormal pancreatic or biliary ductal dilatation.  Diminished attenuation of the liver consistent with diffuse fatty infiltrate is soon is noted.  The spleen appears unremarkable.  There is mild thickening and nodularity of the adrenal glands, appearing stable.    There is an exophytic lesion at the upper pole of the right kidney measuring approximately 2 cm in size, approximately 31 Hounsfield units, cannot be characterized as a cyst, ultrasound  follow-up is recommended.  Bilateral nonobstructing nephrolithiasis is noted.  There is no evidence for ureteral calculus or obstructive uropathy bilaterally.    The arterial vascular structures including the aorta demonstrate atherosclerotic change.  The abdominal aorta demonstrates appropriate opacification.  The urinary bladder appears unremarkable for degree of distention.  Mild calcifications/mineralization is noted associated with the prostate.    Evaluation of the bowel is limited due to motion artifact and the lack of oral contrast.  The appendix is not definitively identified.  There is appearance of circumferential wall and fold thickening associated with the right colon with pericolonic edema/inflammation, correlation for colitis is needed.  Mild wall thickening suggested along the transverse colon, without significant inflammatory change.  Several of the distal small bowel loops appear mildly prominent, extending to the terminal ileum with suggestion of mild wall and fold thickening, correlation for ileitis is needed.  There is no evidence for small bowel obstructive process.  There is no evidence for free intraperitoneal air.    The osseous structures demonstrate prominent chronic change.  There is diminished mineralization and there is degenerative change.  There are areas of a previous vertebroplasty/kyphoplasty noted including the T11 and L3 levels, with associated vertebral body height loss.    Previously identified fracture deformity involving the L1 vertebral level is again noted, this is noted on the prior abdominopelvic CT of June 27, 2024 as well as June 2nd, 2024, there appears to be progression with mild increased scree of vertebral body height loss.  There is mild retropulsion without high-grade spinal canal stenosis.  There is no evidence for posterior element fracture involvement.                                       X-Ray Chest AP Portable (Final result)  Result time 07/29/24 03:12:40       Final result by Luis Moore MD (07/29/24 03:12:40)                   Impression:      Radiographic findings as above.      Electronically signed by: Luis Moore  Date:    07/29/2024  Time:    03:12               Narrative:    EXAMINATION:  XR CHEST AP PORTABLE    CLINICAL HISTORY:  weakness;    TECHNIQUE:  Single frontal view of the chest was performed.    COMPARISON:  Chest radiograph July 24, 2024    FINDINGS:  AP portable chest radiographic examination is submitted.  There is mild diminished depth of inspiration.  The cardiomediastinal silhouette appears stable.  Aortic atherosclerotic change noted.    There is irregular opacity at the right lung base likely relating to parenchymal and pleural change, and may relate to components of atelectasis and scarring, as well as possible pleural fluid with loculated configuration.  Additional irregular parenchymal change at the mid right hemithorax peripherally likely relates to scarring.    Curvilinear density at the left lung base is consistent with scarring/atelectatic change.  Accentuation of pulmonary bronchovascular markings consistent with diminished depth of inspiration noted.  There is no evidence for pneumothorax.    The visualized osseous structures appear intact with chronic change noted.                                       Medications   sodium chloride 0.9% bolus 1,000 mL 1,000 mL (0 mLs Intravenous Stopped 7/29/24 0655)   iohexoL (OMNIPAQUE 350) injection 100 mL (100 mLs Intravenous Given 7/29/24 2355)   furosemide injection 40 mg (40 mg Intravenous Given 7/29/24 0656)     Medical Decision Making  68-year-old male with multiple comorbidities here with periumbilical abdominal pain on eating associated with nausea and decreased oral intake as well as fatigue.    Differential includes acute kidney injury, dehydration, electrolyte derangement, occult infection, pancreatitis, other.    EKG no STEMI.    CXR NAD.    Labs: CBC, BNP reassuring.  LFTs  deranged, consistent with prior.  Renal function stable.  Troponin negative.  CPK normal.  Lactate normal.  BNP 1103, consistent with prior.  UA without infection.  Flu and COVID-19 negative.    CTA chest: no PE, small effusion on L, minimal pleural fluid on R, scattered opacities likely pulmonary edema.    CT abdom/pelvis with IV contrast: distended gallbladder (on prior studies), question colitis/ileitis.    Patient is resting comfortably.  He has had a bolus IV NS 500mL for repeated decreased PO intake.  I ordered IV lasix 40mg for edema to extremities and pulmonary edema on imaging.      Patient has no abdominal tenderness on my repeat exam.  I doubt clinical colitis or ileitis.    Patient d/c'ed to home with vitals stable.  Conditions identified on workup largely chronic and do not require re-admission.      Amount and/or Complexity of Data Reviewed  Labs: ordered.  Radiology: ordered.  ECG/medicine tests: ordered.    Risk  Prescription drug management.                                      Clinical Impression:  Final diagnoses:  [R53.1] Weakness  [R53.1] Generalized weakness (Primary)  [R53.82] Chronic fatigue  [R60.0] Bilateral lower extremity edema  [R60.0] Edema of both upper extremities          ED Disposition Condition    Discharge Stable          ED Prescriptions    None       Follow-up Information       Follow up With Specialties Details Why Contact Info    Tushar Drew MD Internal Medicine   [address] [facility] [id number] [phone]               Elana Velasquez MD  07/29/24 1651

## 2024-07-29 NOTE — ED TRIAGE NOTES
Patient presents to ED via [facility], Per EMS patient family reports decresaded oral intake and fatique with an onset of 1 week. Patient reports lumbar back pain, left foot, and left knee pain. And abdominal pain when eating. Endorse nausea/vomiting and diarrhea.  Denies SOB and chest pain. VSS, safety measures in place, will continue to monitor.

## 2024-07-30 ENCOUNTER — LAB VISIT (OUTPATIENT)
Dept: LAB | Facility: OTHER | Age: 69
End: 2024-07-30
Attending: INTERNAL MEDICINE
Payer: MEDICARE

## 2024-07-30 DIAGNOSIS — R06.02 SHORTNESS OF BREATH: Primary | ICD-10-CM

## 2024-07-30 LAB
ALBUMIN SERPL BCP-MCNC: 1.3 G/DL (ref 3.5–5.2)
ALP SERPL-CCNC: 625 U/L (ref 55–135)
ALT SERPL W/O P-5'-P-CCNC: 31 U/L (ref 10–44)
ANION GAP SERPL CALC-SCNC: 9 MMOL/L (ref 8–16)
ANION GAP SERPL CALC-SCNC: 9 MMOL/L (ref 8–16)
AST SERPL-CCNC: 62 U/L (ref 10–40)
BILIRUB SERPL-MCNC: 1.6 MG/DL (ref 0.1–1)
BUN SERPL-MCNC: 7 MG/DL (ref 8–23)
BUN SERPL-MCNC: 7 MG/DL (ref 8–23)
CALCIUM SERPL-MCNC: 7.6 MG/DL (ref 8.7–10.5)
CALCIUM SERPL-MCNC: 7.6 MG/DL (ref 8.7–10.5)
CHLORIDE SERPL-SCNC: 107 MMOL/L (ref 95–110)
CHLORIDE SERPL-SCNC: 107 MMOL/L (ref 95–110)
CO2 SERPL-SCNC: 24 MMOL/L (ref 23–29)
CO2 SERPL-SCNC: 24 MMOL/L (ref 23–29)
CREAT SERPL-MCNC: 0.6 MG/DL (ref 0.5–1.4)
CREAT SERPL-MCNC: 0.6 MG/DL (ref 0.5–1.4)
EST. GFR  (NO RACE VARIABLE): >60 ML/MIN/1.73 M^2
EST. GFR  (NO RACE VARIABLE): >60 ML/MIN/1.73 M^2
GLUCOSE SERPL-MCNC: 68 MG/DL (ref 70–110)
GLUCOSE SERPL-MCNC: 68 MG/DL (ref 70–110)
POTASSIUM SERPL-SCNC: 4.2 MMOL/L (ref 3.5–5.1)
POTASSIUM SERPL-SCNC: 4.2 MMOL/L (ref 3.5–5.1)
PROT SERPL-MCNC: 5.4 G/DL (ref 6–8.4)
SODIUM SERPL-SCNC: 140 MMOL/L (ref 136–145)
SODIUM SERPL-SCNC: 140 MMOL/L (ref 136–145)

## 2024-07-30 PROCEDURE — 80053 COMPREHEN METABOLIC PANEL: CPT | Performed by: INTERNAL MEDICINE

## 2024-07-31 LAB
OHS QRS DURATION: 102 MS
OHS QRS DURATION: 104 MS
OHS QTC CALCULATION: 501 MS
OHS QTC CALCULATION: 543 MS

## 2024-08-06 ENCOUNTER — DOCUMENT SCAN (OUTPATIENT)
Dept: HOME HEALTH SERVICES | Facility: HOSPITAL | Age: 69
End: 2024-08-06
Payer: MEDICARE

## 2024-08-06 ENCOUNTER — LAB VISIT (OUTPATIENT)
Dept: LAB | Facility: HOSPITAL | Age: 69
End: 2024-08-06
Attending: INTERNAL MEDICINE
Payer: MEDICARE

## 2024-08-06 DIAGNOSIS — I50.22 CHRONIC SYSTOLIC HEART FAILURE: Primary | ICD-10-CM

## 2024-08-06 LAB — BNP SERPL-MCNC: 553 PG/ML (ref 0–99)

## 2024-08-06 PROCEDURE — 83880 ASSAY OF NATRIURETIC PEPTIDE: CPT | Performed by: INTERNAL MEDICINE

## 2024-08-07 ENCOUNTER — HOSPITAL ENCOUNTER (EMERGENCY)
Facility: OTHER | Age: 69
Discharge: HOME | End: 2024-08-08
Attending: EMERGENCY MEDICINE
Payer: MEDICARE

## 2024-08-07 DIAGNOSIS — E46 MALNUTRITION, UNSPECIFIED TYPE: ICD-10-CM

## 2024-08-07 DIAGNOSIS — E86.0 DEHYDRATION: Primary | ICD-10-CM

## 2024-08-07 LAB
ALBUMIN SERPL BCP-MCNC: 1.4 G/DL (ref 3.5–5.2)
ALP SERPL-CCNC: 833 U/L (ref 55–135)
ALT SERPL W/O P-5'-P-CCNC: 35 U/L (ref 10–44)
ANION GAP SERPL CALC-SCNC: 11 MMOL/L (ref 8–16)
AST SERPL-CCNC: 74 U/L (ref 10–40)
BASOPHILS # BLD AUTO: 0.08 K/UL (ref 0–0.2)
BASOPHILS NFR BLD: 1.6 % (ref 0–1.9)
BILIRUB SERPL-MCNC: 1.3 MG/DL (ref 0.1–1)
BNP SERPL-MCNC: 447 PG/ML (ref 0–99)
BUN SERPL-MCNC: 10 MG/DL (ref 8–23)
CALCIUM SERPL-MCNC: 7.3 MG/DL (ref 8.7–10.5)
CHLORIDE SERPL-SCNC: 102 MMOL/L (ref 95–110)
CO2 SERPL-SCNC: 19 MMOL/L (ref 23–29)
CREAT SERPL-MCNC: 0.6 MG/DL (ref 0.5–1.4)
DIFFERENTIAL METHOD BLD: ABNORMAL
DIGOXIN SERPL-MCNC: 0.2 NG/ML (ref 0.8–2)
EOSINOPHIL # BLD AUTO: 0.2 K/UL (ref 0–0.5)
EOSINOPHIL NFR BLD: 3 % (ref 0–8)
ERYTHROCYTE [DISTWIDTH] IN BLOOD BY AUTOMATED COUNT: 16.6 % (ref 11.5–14.5)
EST. GFR  (NO RACE VARIABLE): >60 ML/MIN/1.73 M^2
GLUCOSE SERPL-MCNC: 82 MG/DL (ref 70–110)
HCT VFR BLD AUTO: 35.9 % (ref 40–54)
HGB BLD-MCNC: 12.4 G/DL (ref 14–18)
IMM GRANULOCYTES # BLD AUTO: 0.01 K/UL (ref 0–0.04)
IMM GRANULOCYTES NFR BLD AUTO: 0.2 % (ref 0–0.5)
LACTATE SERPL-SCNC: 1.8 MMOL/L (ref 0.5–2.2)
LIPASE SERPL-CCNC: 9 U/L (ref 4–60)
LYMPHOCYTES # BLD AUTO: 2.3 K/UL (ref 1–4.8)
LYMPHOCYTES NFR BLD: 46.1 % (ref 18–48)
MCH RBC QN AUTO: 31.5 PG (ref 27–31)
MCHC RBC AUTO-ENTMCNC: 34.5 G/DL (ref 32–36)
MCV RBC AUTO: 91 FL (ref 82–98)
MONOCYTES # BLD AUTO: 0.3 K/UL (ref 0.3–1)
MONOCYTES NFR BLD: 6.3 % (ref 4–15)
NEUTROPHILS # BLD AUTO: 2.1 K/UL (ref 1.8–7.7)
NEUTROPHILS NFR BLD: 42.8 % (ref 38–73)
NRBC BLD-RTO: 0 /100 WBC
PLATELET # BLD AUTO: 285 K/UL (ref 150–450)
PMV BLD AUTO: 11.2 FL (ref 9.2–12.9)
POTASSIUM SERPL-SCNC: 4.7 MMOL/L (ref 3.5–5.1)
PROT SERPL-MCNC: 6.2 G/DL (ref 6–8.4)
RBC # BLD AUTO: 3.94 M/UL (ref 4.6–6.2)
SODIUM SERPL-SCNC: 132 MMOL/L (ref 136–145)
TROPONIN I SERPL DL<=0.01 NG/ML-MCNC: 0.01 NG/ML (ref 0–0.03)
WBC # BLD AUTO: 4.95 K/UL (ref 3.9–12.7)

## 2024-08-07 PROCEDURE — 96376 TX/PRO/DX INJ SAME DRUG ADON: CPT

## 2024-08-07 PROCEDURE — 80162 ASSAY OF DIGOXIN TOTAL: CPT | Performed by: EMERGENCY MEDICINE

## 2024-08-07 PROCEDURE — 83605 ASSAY OF LACTIC ACID: CPT | Performed by: EMERGENCY MEDICINE

## 2024-08-07 PROCEDURE — 83880 ASSAY OF NATRIURETIC PEPTIDE: CPT | Performed by: EMERGENCY MEDICINE

## 2024-08-07 PROCEDURE — 83690 ASSAY OF LIPASE: CPT | Performed by: EMERGENCY MEDICINE

## 2024-08-07 PROCEDURE — 99285 EMERGENCY DEPT VISIT HI MDM: CPT | Mod: 25

## 2024-08-07 PROCEDURE — 96374 THER/PROPH/DIAG INJ IV PUSH: CPT

## 2024-08-07 PROCEDURE — 84484 ASSAY OF TROPONIN QUANT: CPT | Performed by: EMERGENCY MEDICINE

## 2024-08-07 PROCEDURE — 93010 ELECTROCARDIOGRAM REPORT: CPT | Mod: ,,, | Performed by: INTERNAL MEDICINE

## 2024-08-07 PROCEDURE — 96375 TX/PRO/DX INJ NEW DRUG ADDON: CPT

## 2024-08-07 PROCEDURE — 85025 COMPLETE CBC W/AUTO DIFF WBC: CPT | Performed by: EMERGENCY MEDICINE

## 2024-08-07 PROCEDURE — 80053 COMPREHEN METABOLIC PANEL: CPT | Performed by: EMERGENCY MEDICINE

## 2024-08-07 PROCEDURE — 96361 HYDRATE IV INFUSION ADD-ON: CPT

## 2024-08-07 PROCEDURE — 93005 ELECTROCARDIOGRAM TRACING: CPT

## 2024-08-07 PROCEDURE — 25000003 PHARM REV CODE 250: Performed by: EMERGENCY MEDICINE

## 2024-08-07 PROCEDURE — 63600175 PHARM REV CODE 636 W HCPCS: Performed by: EMERGENCY MEDICINE

## 2024-08-07 RX ORDER — ONDANSETRON HYDROCHLORIDE 2 MG/ML
4 INJECTION, SOLUTION INTRAVENOUS
Status: COMPLETED | OUTPATIENT
Start: 2024-08-07 | End: 2024-08-07

## 2024-08-07 RX ORDER — MORPHINE SULFATE 4 MG/ML
4 INJECTION, SOLUTION INTRAMUSCULAR; INTRAVENOUS
Status: COMPLETED | OUTPATIENT
Start: 2024-08-07 | End: 2024-08-07

## 2024-08-07 RX ADMIN — IOHEXOL 100 ML: 350 INJECTION, SOLUTION INTRAVENOUS at 11:08

## 2024-08-07 RX ADMIN — ONDANSETRON 4 MG: 2 INJECTION INTRAMUSCULAR; INTRAVENOUS at 11:08

## 2024-08-07 RX ADMIN — SODIUM CHLORIDE 1000 ML: 9 INJECTION, SOLUTION INTRAVENOUS at 10:08

## 2024-08-07 RX ADMIN — MORPHINE SULFATE 4 MG: 4 INJECTION, SOLUTION INTRAMUSCULAR; INTRAVENOUS at 11:08

## 2024-08-08 VITALS
HEART RATE: 93 BPM | HEIGHT: 72 IN | TEMPERATURE: 98 F | WEIGHT: 184 LBS | OXYGEN SATURATION: 98 % | DIASTOLIC BLOOD PRESSURE: 86 MMHG | RESPIRATION RATE: 17 BRPM | SYSTOLIC BLOOD PRESSURE: 140 MMHG | BODY MASS INDEX: 24.92 KG/M2

## 2024-08-08 LAB
BACTERIA #/AREA URNS HPF: ABNORMAL /HPF
BILIRUB UR QL STRIP: ABNORMAL
CAOX CRY URNS QL MICRO: ABNORMAL
CLARITY UR: CLEAR
COLOR UR: YELLOW
GLUCOSE UR QL STRIP: NEGATIVE
HGB UR QL STRIP: NEGATIVE
HYALINE CASTS #/AREA URNS LPF: 1 /LPF
KETONES UR QL STRIP: ABNORMAL
LEUKOCYTE ESTERASE UR QL STRIP: NEGATIVE
MICROSCOPIC COMMENT: ABNORMAL
NITRITE UR QL STRIP: NEGATIVE
OHS QRS DURATION: 100 MS
OHS QTC CALCULATION: 502 MS
PH UR STRIP: 6 [PH] (ref 5–8)
PROT UR QL STRIP: ABNORMAL
RBC #/AREA URNS HPF: 6 /HPF (ref 0–4)
SP GR UR STRIP: >1.03 (ref 1–1.03)
URN SPEC COLLECT METH UR: ABNORMAL
UROBILINOGEN UR STRIP-ACNC: >=8 EU/DL
WBC #/AREA URNS HPF: 3 /HPF (ref 0–5)

## 2024-08-08 PROCEDURE — 25000003 PHARM REV CODE 250: Performed by: EMERGENCY MEDICINE

## 2024-08-08 PROCEDURE — 25500020 PHARM REV CODE 255: Performed by: EMERGENCY MEDICINE

## 2024-08-08 PROCEDURE — 81000 URINALYSIS NONAUTO W/SCOPE: CPT | Performed by: EMERGENCY MEDICINE

## 2024-08-08 PROCEDURE — 63600175 PHARM REV CODE 636 W HCPCS: Performed by: EMERGENCY MEDICINE

## 2024-08-08 RX ORDER — LOPERAMIDE HYDROCHLORIDE 2 MG/1
2 CAPSULE ORAL
Status: COMPLETED | OUTPATIENT
Start: 2024-08-08 | End: 2024-08-08

## 2024-08-08 RX ORDER — MORPHINE SULFATE 4 MG/ML
4 INJECTION, SOLUTION INTRAMUSCULAR; INTRAVENOUS
Status: COMPLETED | OUTPATIENT
Start: 2024-08-08 | End: 2024-08-08

## 2024-08-08 RX ADMIN — LOPERAMIDE HYDROCHLORIDE 2 MG: 2 CAPSULE ORAL at 12:08

## 2024-08-08 RX ADMIN — MORPHINE SULFATE 4 MG: 4 INJECTION, SOLUTION INTRAMUSCULAR; INTRAVENOUS at 01:08

## 2024-08-08 NOTE — ED TRIAGE NOTES
Nithin Wagner, an 69 y.o. male presents to the ED via EMS complaining of being dehydrated, diarrhea, and experiencing a decreased urine output.      Chief Complaint   Patient presents with    Dehydration     Patient presents to the ED via [facility] from home with reports of having dehydration and decreased urine output. Symptoms include diarrhea. Denies any nausea, vomiting, fever, or chills. CBG per  mg/dl.      Review of patient's allergies indicates:   Allergen Reactions    Enbrel [etanercept] Shortness Of Breath     CHF    Nsaids (non-steroidal anti-inflammatory drug) Other (See Comments)     hypertention    Other reaction(s): Other (See Comments)    HTN    Patient states he tolerates anti inflammatory eye drops    Statins-hmg-coa reductase inhibitors Other (See Comments)     Heart arrythemias    Other reaction(s): Other (See Comments)    Joint pain and cardiac arrythmias    Pcn [penicillins] Rash     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    forehead    Stroke     TIA

## 2024-08-08 NOTE — ED PROVIDER NOTES
Encounter Date: 8/7/2024       History     Chief Complaint   Patient presents with    Dehydration     Patient presents to the ED via [facility] from home with reports of having dehydration and decreased urine output. Symptoms include diarrhea. Denies any nausea, vomiting, fever, or chills. CBG per  mg/dl.      HPI    69-year-old male past medical history diabetes, hyperlipidemia, hypertension, RA, TIA presents with concerns for dehydration and decreased urine output.  Patient reports having similar episodes of this earlier this year and states that it usually all comes with some abdominal pain which she has been diagnosed with colitis previously 4, states he was given some pain medication for last time, states resolved but subsequently came back a couple of days later.  He reports nausea as well as some abdominal pain, does report ongoing diarrhea.  He reports everything that goes down will call his significant amount of pain and some immediate diarrhea.  He reports no history of C diff infection, denies any recent antibiotic use.  He does report having some redness to his legs in his slight bit of swelling although the swelling is better at this point.  He does report concern for possible DVT.  He does report a history of a PE in the past he is currently not on any blood thinning medication.  Patient reports significant fatigue and lightheadedness, states he has lost a considerable amount of weight in the last year.  He denies any further complaints.    Review of patient's allergies indicates:   Allergen Reactions    Enbrel [etanercept] Shortness Of Breath     CHF    Nsaids (non-steroidal anti-inflammatory drug) Other (See Comments)     hypertention    Other reaction(s): Other (See Comments)    HTN    Patient states he tolerates anti inflammatory eye drops    Statins-hmg-coa reductase inhibitors Other (See Comments)     Heart arrythemias    Other reaction(s): Other (See Comments)    Joint pain and  cardiac arrythmias    Pcn [penicillins] Rash     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    forehead    Stroke     TIA     Past Surgical History:   Procedure Laterality Date    CATARACT EXTRACTION W/  INTRAOCULAR LENS IMPLANT Right 3/28/2024    Procedure: EXTRACTION, CATARACT, WITH IOL INSERTION;  Surgeon: Hans Woodward MD;  Location: [facility] OR;  Service: Ophthalmology;  Laterality: Right;    CORONARY ANGIOGRAPHY N/A 3/10/2021    Procedure: ANGIOGRAM, CORONARY ARTERY - right radial;  Surgeon: Shemar Dempsey MD;  Location: [facility] CATH LAB;  Service: Cardiology;  Laterality: N/A;    CORONARY STENT PLACEMENT  03/10/2021    prox-mid RCA K[place]va 4.5 x 26 mm, 4.5 x 12 mm    ESOPHAGOGASTRODUODENOSCOPY N/A 7/8/2024    Procedure: EGD (ESOPHAGOGASTRODUODENOSCOPY);  Surgeon: Janet Win MD;  Location: [facility] ENDO;  Service: Endoscopy;  Laterality: N/A;    FIXATION KYPHOPLASTY N/A 4/22/2024    Procedure: KYPHOPLASTY;  Surgeon: Stefan Stern MD;  Location: [facility] OR;  Service: Pain Management;  Laterality: N/A;  NO PROPOFOL    INCISION AND DRAINAGE OF SCROTUM N/A 11/15/2022    Procedure: INCISION AND DRAINAGE, SCROTUM;  Surgeon: William Hatch MD;  Location: [facility] OR;  Service: Urology;  Laterality: N/A;    INCISION AND DRAINAGE OF SCROTUM N/A 11/17/2022    Procedure: INCISION AND DRAINAGE, SCROTUM;  Surgeon: William Hatch MD;  Location: [facility] OR;  Service: Urology;  Laterality: N/A;    LUNG LOBECTOMY Right 2008    RUL lobectomy after removal of atrial myxoma    MAGNETIC RESONANCE IMAGING N/A 4/30/2024    Procedure: MRI (Magnetic  Resonance Imagine);  Surgeon: Peace Watts;  Location: [facility];  Service: Anesthesiology;  Laterality: N/A;    PERITONEOCENTESIS N/A 7/2/2024    Procedure: PARACENTESIS, ABDOMINAL;  Surgeon: Ventura Jordan MD;  Location: [facility] CATH LAB;  Service: Radiology;  Laterality: N/A;    PLEURA BIOPSY      RESECTION OF ATRIAL MYXOMA  2007     Family History   Problem Relation Name Age of Onset    Hodgkin's lymphoma Mother      Diabetes type II Mother      Kidney failure Father      Diabetes type I Father      Cancer Sister       Social History     Tobacco Use    Smoking status: Every Day     Current packs/day: 1.00     Average packs/day: 1 pack/day for 35.0 years (35.0 ttl pk-yrs)     Types: Cigarettes    Smokeless tobacco: Never   Substance Use Topics    Alcohol use: Not Currently    Drug use: No     Review of Systems   Constitutional:  Positive for activity change and appetite change.   HENT: Negative.     Eyes: Negative.    Respiratory: Negative.     Cardiovascular:  Positive for leg swelling.   Gastrointestinal:  Positive for diarrhea and nausea.   Genitourinary: Negative.    Musculoskeletal:  Positive for back pain.   Skin: Negative.    Neurological: Negative.        Physical Exam     Initial Vitals [08/07/24 2157]   BP Pulse Resp Temp SpO2   118/79 98 18 97.9 °F (36.6 °C) 98 %      MAP       --         Physical Exam    Nursing note and vitals reviewed.  Constitutional: He is not diaphoretic. He appears distressed (Chronically ill-appearing elderly male, conversing with ease).   HENT:   Head: Normocephalic and atraumatic.   Nose: Nose normal.   Dry mucous membranes   Eyes: EOM are normal. Pupils are equal, round, and reactive to light.   Neck: Neck supple. No tracheal deviation present. No JVD present.   Normal range of motion.  Cardiovascular:  Regular rhythm, normal heart sounds and intact distal pulses.           Tachycardic   Pulmonary/Chest: Breath sounds normal. No respiratory distress. He has no wheezes.  He has no rhonchi. He has no rales.   Abdominal: Abdomen is soft. Bowel sounds are normal. He exhibits no distension. There is abdominal tenderness (mild tenderness epigastrically). There is no rebound and no guarding.   Musculoskeletal:         General: Edema (trace edema in bilateral feet) present. No tenderness. Normal range of motion.      Cervical back: Normal range of motion and neck supple.     Neurological: He is alert and oriented to person, place, and time. He has normal strength.   Skin: Skin is warm and dry. Capillary refill takes less than 2 seconds. No rash noted. No erythema.         ED Course   Procedures  Labs Reviewed   CBC W/ AUTO DIFFERENTIAL - Abnormal       Result Value    WBC 4.95      RBC 3.94 (*)     Hemoglobin 12.4 (*)     Hematocrit 35.9 (*)     MCV 91      MCH 31.5 (*)     MCHC 34.5      RDW 16.6 (*)     Platelets 285      MPV 11.2      Immature Granulocytes 0.2      Gran # (ANC) 2.1      Immature Grans (Abs) 0.01      Lymph # 2.3      Mono # 0.3      Eos # 0.2      Baso # 0.08      nRBC 0      Gran % 42.8      Lymph % 46.1      Mono % 6.3      Eosinophil % 3.0      Basophil % 1.6      Differential Method Automated     COMPREHENSIVE METABOLIC PANEL - Abnormal    Sodium 132 (*)     Potassium 4.7      Chloride 102      CO2 19 (*)     Glucose 82      BUN 10      Creatinine 0.6      Calcium 7.3 (*)     Total Protein 6.2      Albumin 1.4 (*)     Total Bilirubin 1.3 (*)     Alkaline Phosphatase 833 (*)     AST 74 (*)     ALT 35      eGFR >60      Anion Gap 11     B-TYPE NATRIURETIC PEPTIDE - Abnormal     (*)    URINALYSIS, REFLEX TO URINE CULTURE - Abnormal    Specimen UA Urine, Clean Catch      Color, UA Yellow      Appearance, UA Clear      pH, UA 6.0      Specific Gravity, UA >1.030 (*)     Protein, UA 1+ (*)     Glucose, UA Negative      Ketones, UA Trace (*)     Bilirubin (UA) 1+ (*)     Occult Blood UA Negative      Nitrite, UA Negative      Urobilinogen, UA >=8.0 (*)      Leukocytes, UA Negative      Narrative:     Specimen Source->Urine   DIGOXIN LEVEL - Abnormal    Digoxin Lvl 0.2 (*)    URINALYSIS MICROSCOPIC - Abnormal    RBC, UA 6 (*)     WBC, UA 3      Bacteria Occasional      Hyaline Casts, UA 1      Ca Oxalate Martha, UA Rare      Microscopic Comment SEE COMMENT      Narrative:     Specimen Source->Urine   TROPONIN I    Troponin I 0.013     LACTIC ACID, PLASMA    Lactate (Lactic Acid) 1.8     LIPASE    Lipase 9          ECG Results              EKG 12-lead (Final result)        Collection Time Result Time QRS Duration OHS QTC Calculation    08/07/24 22:43:33 08/08/24 13:20:55 100 502                     Final result by Interface, Lab In [facility] (08/08/24 13:21:00)                   Narrative:    Test Reason : R07.9,    Vent. Rate : 094 BPM     Atrial Rate : 094 BPM     P-R Int : 144 ms          QRS Dur : 100 ms      QT Int : 402 ms       P-R-T Axes : -24 -15 138 degrees     QTc Int : 502 ms    Sinus rhythm with occasional Premature ventricular complexes  ST and T wave abnormality, consider anterolateral ischemia  Prolonged QT  Abnormal ECG    Confirmed by Laila Norton MD ([id number]) on 8/8/2024 1:20:53 PM    Referred By: [email or web address]   SELF           Confirmed By:Laila Norton MD                                  Imaging Results              CTA Chest Non-Coronary (PE Studies) (Final result)  Result time 08/08/24 00:10:29      Final result by Latanya Liz MD (08/08/24 00:10:29)                   Impression:      1. No evidence of PE.  2. Chronic opacity and/or scarring in the right upper lobe.  Additional bilateral lower lobe dependent atelectasis and/or scarring.  Small left pleural effusion.  3. Small volume perisplenic fluid/ascites.  4. Additional findings as detailed above.      Electronically signed by: Latanya Liz MD  Date:    08/08/2024  Time:    00:10               Narrative:    EXAMINATION:  CTA CHEST NON CORONARY (PE STUDIES)    CLINICAL HISTORY:  Pulmonary  embolism (PE) suspected, high prob;    TECHNIQUE:  Low dose axial images, sagittal and coronal reformations were obtained from the thoracic inlet to the lung bases following the IV administration of 100 mL of Omnipaque 350.  Contrast timing was optimized to evaluate the pulmonary arteries.  MIP images were performed.    COMPARISON:  Recent CTA chest from 07/29/2024.    FINDINGS:  Structures at the base of the neck are unremarkable.  Aorta is non-aneurysmal.  The heart is normal in size without pericardial effusion.  No intraluminal filling defects within the pulmonary arteries to suggest pulmonary thromboembolism.   Mild aortic atherosclerosis and coronary artery calcification are seen.  The esophagus is unremarkable along its course.    The trachea and bronchi are patent.  The lungs are symmetrically expanded.  Chronic opacity and/or scarring is seen in the right upper lobe.  Bilateral lower lobe chronic dependent atelectasis and/or scarring are also seen.  There is small left pleural effusion.    There is small volume perisplenic fluid/ascites.  The visualized abdominal structures otherwise show no acute abnormalities.  There is mild T12 vertebral body compression fracture status post vertebroplasty.  Small amount of vertebroplasty cement extends into the T11-12 intervertebral disc space.  Osseous structures demonstrate degenerative change.  Remote appearing rib fracture deformity is seen of the right lateral 5th rib.  Extrathoracic soft tissues are unremarkable.                                       X-Ray Chest AP Portable (Final result)  Result time 08/07/24 23:16:35      Final result by Latanya Liz MD (08/07/24 23:16:35)                   Impression:      Right pleural effusion with right basilar atelectasis/consolidation.      Electronically signed by: Latanya Liz MD  Date:    08/07/2024  Time:    23:16               Narrative:    EXAMINATION:  XR CHEST AP PORTABLE    CLINICAL HISTORY:  Chest  Pain;    TECHNIQUE:  Single frontal view of the chest was performed.    COMPARISON:  07/29/2024.    FINDINGS:  Cardiac silhouette is normal in size.  Lungs are symmetrically expanded.  Right pleural effusion is seen with atelectasis/consolidation at the right lung base.  No significant left-sided pleural effusion.  No pneumothorax.  No acute osseous abnormality identified.                                       Medications   sodium chloride 0.9% bolus 1,000 mL 1,000 mL (0 mLs Intravenous Stopped 8/7/24 2336)   morphine injection 4 mg (4 mg Intravenous Given 8/7/24 2324)   ondansetron injection 4 mg (4 mg Intravenous Given 8/7/24 2322)   iohexoL (OMNIPAQUE 350) injection 100 mL (100 mLs Intravenous Given 8/7/24 2352)   loperamide capsule 2 mg (2 mg Oral Given 8/8/24 0018)   morphine injection 4 mg (4 mg Intravenous Given 8/8/24 0100)     Medical Decision Making  Amount and/or Complexity of Data Reviewed  Labs: ordered.  Radiology: ordered.    Risk  Prescription drug management.      MDM:    69-year-old male past medical history diabetes, hyperlipidemia, hypertension, RA, TIA presents with concerns for dehydration and decreased urine output. Differential Diagnosis includes:  Acute mi/ACS, dehydration DONNA, electrolyte abnormality, sepsis, urinary retention.  Physical exam as noted above.  ED workup notable for lactate 1.8, CBC with white blood cell count 4.95, hemoglobin 12.4, CMP with sodium 132, bicarb 19, T bili 1.3, troponin 0.013, BNP 4 4 7, lipase 9, chest x-ray shows right-sided pleural effusion with right basilar atelectasis/consolidation.  EKG shows sinus rhythm with occasional PVCs rate of 94 beats per minute, nonspecific ST and T-wave changes noted inferiorly, similar to prior, no STEMI. CTA - no evidence of PE, chronic opacity in or scarring in the right upper lobe small left pleural effusion, small volume perisplenic fluid/ascites.  Patient presentation appears more consistent with malnutrition,  dehydration, given fluids pain medication initially with significant improvement in symptoms.  He was able to urinate at bedside, tolerating oral liquids and fluids at this point and will be stable for close follow-up in clinic outpatient. Do not suspect any additional surgical or medical emergency. Discussed diagnosis and further treatment with patient, including f/u.  Return precautions given and all questions answered.  Patient in understanding of plan.  Pt discharged to home improved and stable.        Note was created using voice recognition software. Note may have occasional typographical or grammatical errors, garbled syntax, and other bizarre constructions that may not have been identified and edited despite good asia initial review prior to signing.                                       Clinical Impression:  Final diagnoses:  [E86.0] Dehydration (Primary)  [E46] Malnutrition, unspecified type          ED Disposition Condition    Discharge Stable          ED Prescriptions    None       Follow-up Information       Follow up With Specialties Details Why Contact Info    [facility] - Emergency Dept Emergency Medicine Go to  If symptoms worsen [address] [facility] [id number] [phone]    Branden Carey MD Gastroenterology Schedule an appointment as soon as possible for a visit   [address] [place] [address]  [address]  [facility]  [facility] [address]  [phone]      Jeremy Salgado,  Family Medicine   [address] [facility]  [address]  [facility] [id number]  [phone]      Bjorn Ambrose MD Internal Medicine   [address] [facility]  [address]  [facility] [id number]  [phone]               Tavon Marsh MD  08/15/24 0723

## 2024-08-09 ENCOUNTER — PATIENT OUTREACH (OUTPATIENT)
Dept: EMERGENCY MEDICINE | Facility: OTHER | Age: 69
End: 2024-08-09
Payer: MEDICARE

## 2024-08-09 ENCOUNTER — TELEPHONE (OUTPATIENT)
Dept: INTERNAL MEDICINE | Facility: CLINIC | Age: 69
End: 2024-08-09
Payer: MEDICARE

## 2024-08-09 NOTE — TELEPHONE ENCOUNTER
----- Message from Tiffanie Hammond LPN sent at 8/9/2024 12:26 PM CDT -----  Regarding: New Patient Appointment Request  Good Afternoon,     I spoke to this patient today after most recent ED visit and he is trying to find a new Internal Medicine provider and the ED provided Dr. Bjorn Ambrose's contact info and he would like to schedule a new patient virtual appointment if available. Pt stated right now he is bedridden and prefers the first visit to be virtual if at all possible. I was unable to schedule this appointment. Can clinic staff please reach out to this patient regarding scheduling a new patient appointment in the clinic?    Thanks in advance!

## 2024-08-14 NOTE — TELEPHONE ENCOUNTER
ATTEMPTED TO CALL PT TO SEE IF HE WOULD LIKE A VIRTUAL APPT OR IN-PERSON, DIDN'T RECEIVE A MESSAGE SO I LVM.

## 2024-08-16 ENCOUNTER — TELEPHONE (OUTPATIENT)
Dept: NEUROSURGERY | Facility: CLINIC | Age: 69
End: 2024-08-16
Payer: MEDICARE

## 2024-08-16 ENCOUNTER — PATIENT MESSAGE (OUTPATIENT)
Dept: RHEUMATOLOGY | Facility: CLINIC | Age: 69
End: 2024-08-16
Payer: MEDICARE

## 2024-08-16 DIAGNOSIS — S32.019A CLOSED FRACTURE OF FIRST LUMBAR VERTEBRA, UNSPECIFIED FRACTURE MORPHOLOGY, INITIAL ENCOUNTER: Primary | ICD-10-CM

## 2024-08-16 DIAGNOSIS — M54.50 LUMBAR PAIN: Primary | ICD-10-CM

## 2024-08-16 NOTE — TELEPHONE ENCOUNTER
Called pt to schedule imaging, bone density and appointment with Dr. Su pt refused stated he was not interested in the plan of care he only wanted a new back brace . Pt was advised to contact pain management or pcp to order new  brace pt verbalized understanding.

## 2024-08-19 ENCOUNTER — HOSPITAL ENCOUNTER (EMERGENCY)
Facility: HOSPITAL | Age: 69
Discharge: HOME | End: 2024-08-20
Attending: EMERGENCY MEDICINE
Payer: MEDICARE

## 2024-08-19 DIAGNOSIS — G45.9 TIA (TRANSIENT ISCHEMIC ATTACK): Primary | ICD-10-CM

## 2024-08-19 DIAGNOSIS — E46 MALNUTRITION, UNSPECIFIED TYPE: ICD-10-CM

## 2024-08-19 DIAGNOSIS — R29.818 ACUTE FOCAL NEUROLOGICAL DEFICIT: ICD-10-CM

## 2024-08-19 LAB
BASOPHILS # BLD AUTO: 0.08 K/UL (ref 0–0.2)
BASOPHILS NFR BLD: 2.2 % (ref 0–1.9)
CREAT SERPL-MCNC: 0.5 MG/DL (ref 0.5–1.4)
DIFFERENTIAL METHOD BLD: ABNORMAL
EOSINOPHIL # BLD AUTO: 0.1 K/UL (ref 0–0.5)
EOSINOPHIL NFR BLD: 3.1 % (ref 0–8)
ERYTHROCYTE [DISTWIDTH] IN BLOOD BY AUTOMATED COUNT: 16.7 % (ref 11.5–14.5)
HCT VFR BLD AUTO: 36.5 % (ref 40–54)
HGB BLD-MCNC: 12.4 G/DL (ref 14–18)
IMM GRANULOCYTES # BLD AUTO: 0.01 K/UL (ref 0–0.04)
IMM GRANULOCYTES NFR BLD AUTO: 0.3 % (ref 0–0.5)
INR PPP: 1.1 (ref 0.8–1.2)
LYMPHOCYTES # BLD AUTO: 1.5 K/UL (ref 1–4.8)
LYMPHOCYTES NFR BLD: 43.1 % (ref 18–48)
MCH RBC QN AUTO: 32.1 PG (ref 27–31)
MCHC RBC AUTO-ENTMCNC: 34 G/DL (ref 32–36)
MCV RBC AUTO: 95 FL (ref 82–98)
MONOCYTES # BLD AUTO: 0.3 K/UL (ref 0.3–1)
MONOCYTES NFR BLD: 7 % (ref 4–15)
NEUTROPHILS # BLD AUTO: 1.6 K/UL (ref 1.8–7.7)
NEUTROPHILS NFR BLD: 44.3 % (ref 38–73)
NRBC BLD-RTO: 1 /100 WBC
PLATELET # BLD AUTO: 255 K/UL (ref 150–450)
PMV BLD AUTO: 11 FL (ref 9.2–12.9)
PROTHROMBIN TIME: 11.9 SEC (ref 9–12.5)
RBC # BLD AUTO: 3.86 M/UL (ref 4.6–6.2)
SAMPLE: NORMAL
WBC # BLD AUTO: 3.57 K/UL (ref 3.9–12.7)

## 2024-08-19 PROCEDURE — 80053 COMPREHEN METABOLIC PANEL: CPT | Performed by: EMERGENCY MEDICINE

## 2024-08-19 PROCEDURE — 93010 ELECTROCARDIOGRAM REPORT: CPT | Mod: ,,, | Performed by: INTERNAL MEDICINE

## 2024-08-19 PROCEDURE — 93005 ELECTROCARDIOGRAM TRACING: CPT

## 2024-08-19 PROCEDURE — 85610 PROTHROMBIN TIME: CPT | Performed by: EMERGENCY MEDICINE

## 2024-08-19 PROCEDURE — 85025 COMPLETE CBC W/AUTO DIFF WBC: CPT | Performed by: EMERGENCY MEDICINE

## 2024-08-19 PROCEDURE — 84484 ASSAY OF TROPONIN QUANT: CPT | Performed by: EMERGENCY MEDICINE

## 2024-08-19 PROCEDURE — 83605 ASSAY OF LACTIC ACID: CPT

## 2024-08-19 PROCEDURE — 80061 LIPID PANEL: CPT | Performed by: EMERGENCY MEDICINE

## 2024-08-19 PROCEDURE — 99285 EMERGENCY DEPT VISIT HI MDM: CPT | Mod: 25

## 2024-08-19 PROCEDURE — 63600175 PHARM REV CODE 636 W HCPCS: Performed by: EMERGENCY MEDICINE

## 2024-08-19 PROCEDURE — 99900035 HC TECH TIME PER 15 MIN (STAT)

## 2024-08-19 PROCEDURE — 84443 ASSAY THYROID STIM HORMONE: CPT | Performed by: EMERGENCY MEDICINE

## 2024-08-19 PROCEDURE — 83690 ASSAY OF LIPASE: CPT | Performed by: EMERGENCY MEDICINE

## 2024-08-19 PROCEDURE — 82803 BLOOD GASES ANY COMBINATION: CPT

## 2024-08-19 RX ADMIN — SODIUM CHLORIDE, POTASSIUM CHLORIDE, SODIUM LACTATE AND CALCIUM CHLORIDE 500 ML: 600; 310; 30; 20 INJECTION, SOLUTION INTRAVENOUS at 11:08

## 2024-08-19 RX ADMIN — IOHEXOL 75 ML: 350 INJECTION, SOLUTION INTRAVENOUS at 11:08

## 2024-08-20 VITALS
HEIGHT: 72 IN | OXYGEN SATURATION: 98 % | HEART RATE: 103 BPM | SYSTOLIC BLOOD PRESSURE: 115 MMHG | DIASTOLIC BLOOD PRESSURE: 73 MMHG | WEIGHT: 184 LBS | RESPIRATION RATE: 24 BRPM | TEMPERATURE: 98 F | BODY MASS INDEX: 24.92 KG/M2

## 2024-08-20 LAB
ALBUMIN SERPL BCP-MCNC: 1.5 G/DL (ref 3.5–5.2)
ALP SERPL-CCNC: 450 U/L (ref 55–135)
ALT SERPL W/O P-5'-P-CCNC: 25 U/L (ref 10–44)
ANION GAP SERPL CALC-SCNC: 8 MMOL/L (ref 8–16)
AST SERPL-CCNC: 45 U/L (ref 10–40)
BILIRUB SERPL-MCNC: 1 MG/DL (ref 0.1–1)
BILIRUB UR QL STRIP: NEGATIVE
BUN SERPL-MCNC: 7 MG/DL (ref 8–23)
CALCIUM SERPL-MCNC: 7.6 MG/DL (ref 8.7–10.5)
CHLORIDE SERPL-SCNC: 103 MMOL/L (ref 95–110)
CHOLEST SERPL-MCNC: 141 MG/DL (ref 120–199)
CHOLEST/HDLC SERPL: 5.9 {RATIO} (ref 2–5)
CLARITY UR REFRACT.AUTO: CLEAR
CO2 SERPL-SCNC: 24 MMOL/L (ref 23–29)
COLOR UR AUTO: YELLOW
CREAT SERPL-MCNC: 0.6 MG/DL (ref 0.5–1.4)
EST. GFR  (NO RACE VARIABLE): >60 ML/MIN/1.73 M^2
GLUCOSE SERPL-MCNC: 122 MG/DL (ref 70–110)
GLUCOSE UR QL STRIP: NEGATIVE
HDLC SERPL-MCNC: 24 MG/DL (ref 40–75)
HDLC SERPL: 17 % (ref 20–50)
HGB UR QL STRIP: NEGATIVE
KETONES UR QL STRIP: NEGATIVE
LDLC SERPL CALC-MCNC: 79.4 MG/DL (ref 63–159)
LEUKOCYTE ESTERASE UR QL STRIP: NEGATIVE
LIPASE SERPL-CCNC: 4 U/L (ref 4–60)
NITRITE UR QL STRIP: NEGATIVE
NONHDLC SERPL-MCNC: 117 MG/DL
OHS QRS DURATION: 96 MS
OHS QTC CALCULATION: 469 MS
PH UR STRIP: 7 [PH] (ref 5–8)
POTASSIUM SERPL-SCNC: 4.1 MMOL/L (ref 3.5–5.1)
PROT SERPL-MCNC: 5.9 G/DL (ref 6–8.4)
PROT UR QL STRIP: NEGATIVE
SODIUM SERPL-SCNC: 135 MMOL/L (ref 136–145)
SP GR UR STRIP: 1.01 (ref 1–1.03)
TRIGL SERPL-MCNC: 188 MG/DL (ref 30–150)
TROPONIN I SERPL DL<=0.01 NG/ML-MCNC: 0.01 NG/ML (ref 0–0.03)
TSH SERPL DL<=0.005 MIU/L-ACNC: 3.73 UIU/ML (ref 0.4–4)
URN SPEC COLLECT METH UR: NORMAL

## 2024-08-20 PROCEDURE — 25000003 PHARM REV CODE 250: Performed by: EMERGENCY MEDICINE

## 2024-08-20 PROCEDURE — 96374 THER/PROPH/DIAG INJ IV PUSH: CPT

## 2024-08-20 PROCEDURE — 81003 URINALYSIS AUTO W/O SCOPE: CPT | Performed by: EMERGENCY MEDICINE

## 2024-08-20 PROCEDURE — 96361 HYDRATE IV INFUSION ADD-ON: CPT

## 2024-08-20 PROCEDURE — 25500020 PHARM REV CODE 255: Performed by: EMERGENCY MEDICINE

## 2024-08-20 PROCEDURE — 63600175 PHARM REV CODE 636 W HCPCS: Performed by: EMERGENCY MEDICINE

## 2024-08-20 RX ORDER — FENTANYL CITRATE 50 UG/ML
50 INJECTION, SOLUTION INTRAMUSCULAR; INTRAVENOUS
Status: DISCONTINUED | OUTPATIENT
Start: 2024-08-20 | End: 2024-08-20

## 2024-08-20 RX ORDER — MIDAZOLAM HYDROCHLORIDE 1 MG/ML
1 INJECTION, SOLUTION INTRAMUSCULAR; INTRAVENOUS EVERY 5 MIN PRN
Status: DISCONTINUED | OUTPATIENT
Start: 2024-08-20 | End: 2024-08-20 | Stop reason: HOSPADM

## 2024-08-20 RX ORDER — ASPIRIN 325 MG
325 TABLET ORAL
Status: COMPLETED | OUTPATIENT
Start: 2024-08-20 | End: 2024-08-20

## 2024-08-20 RX ORDER — HYDROCODONE BITARTRATE AND ACETAMINOPHEN 7.5; 325 MG/1; MG/1
1 TABLET ORAL EVERY 6 HOURS PRN
Status: DISCONTINUED | OUTPATIENT
Start: 2024-08-20 | End: 2024-08-20 | Stop reason: HOSPADM

## 2024-08-20 RX ORDER — MORPHINE SULFATE 2 MG/ML
2 INJECTION, SOLUTION INTRAMUSCULAR; INTRAVENOUS
Status: COMPLETED | OUTPATIENT
Start: 2024-08-20 | End: 2024-08-20

## 2024-08-20 RX ADMIN — SODIUM CHLORIDE, POTASSIUM CHLORIDE, SODIUM LACTATE AND CALCIUM CHLORIDE 500 ML: 600; 310; 30; 20 INJECTION, SOLUTION INTRAVENOUS at 04:08

## 2024-08-20 RX ADMIN — MORPHINE SULFATE 2 MG: 2 INJECTION, SOLUTION INTRAMUSCULAR; INTRAVENOUS at 01:08

## 2024-08-20 RX ADMIN — HYDROCODONE BITARTRATE AND ACETAMINOPHEN 1 TABLET: 7.5; 325 TABLET ORAL at 02:08

## 2024-08-20 RX ADMIN — ASPIRIN 325 MG ORAL TABLET 325 MG: 325 PILL ORAL at 05:08

## 2024-08-20 NOTE — ED PROVIDER NOTES
"History:  Nithin Wagner is a 69 y.o. male who presents to the ED with Aphasia (BIB EMS c/o aphasia, BLE weakness, and slurred speech that resolved by the time EMS arrived. Pupils unequal. Hx of fall with head trauma x4 days ago. C/o HA since fall)    Described as 68yo M with PMH diabetes, hyperlipidemia, hypertension, RA, TIA presenting to the emergency department with aphasia episode, since resolved.  He reports around 930, he was having word-finding difficulties, could not remember the [facility] for a wheelchair or ambulance.  He did not feel like himself, felt generally weak in his bilateral lower extremities.  He was caretaker activated 911, and symptoms had resolved prior to EMS arrival.  They noticed slightly unequal pupils, the patient does report he has a history of eye surgery in the past.  He was reports he had a fall 4 days ago, striking his head, now with neck pain.    Review of Systems: All systems reviewed and are negative except as per history of present illness.    Medications:   Previous Medications    BD ULTRA-FINE RAHUL PEN NEEDLE 32 GAUGE X 5/32" NDLE    To use 6 daily    DICLOFENAC SODIUM (SOLARAZE) 3 % GEL    Apply to affected area three times a day.    DICLOFENAC SODIUM (SOLARAZE) 3 % GEL    Apply topically to the affected area 3 (three) times daily.    DIGOXIN (LANOXIN) 125 MCG TABLET    TAKE 1 TABLET BY MOUTH EVERY DAY    ERGOCALCIFEROL (ERGOCALCIFEROL) 50,000 UNIT CAP    Take 1 capsule (50,000 Units total) by mouth every 7 days.    ESCITALOPRAM OXALATE (LEXAPRO) 20 MG TABLET    Take 20 mg by mouth every evening.    ESCITALOPRAM OXALATE (LEXAPRO) 20 MG TABLET    TAKE 1 TABLET BY MOUTH EVERY DAY    FOLIC ACID (FOLVITE) 1 MG TABLET    Take 1 tablet (1 mg total) by mouth once daily.    GABAPENTIN (NEURONTIN) 400 MG CAPSULE    Take 1 capsule (400 mg total) by mouth 3 (three) times daily.    HYDROCODONE-ACETAMINOPHEN (NORCO)  MG PER TABLET    Take 1 tab by mouth every 6 hours as needed for severe " pain    HYDROCODONE-ACETAMINOPHEN (NORCO) 5-325 MG PER TABLET    TAKE 1 TABLET BY MOUTH EVERY 6 HOURS AS NEEDED FOR PAIN.  GREATER THAN 7 DAYS MEDICALLY NECESSARY.  CONTINUING TO WEAN OFF ON THIS RX.    HYDROCODONE-ACETAMINOPHEN (NORCO) 7.5-325 MG PER TABLET    Take 1 tablet by mouth every 6 (six) hours as needed for Pain.    HYDROCODONE-ACETAMINOPHEN 7.5-300 MG TAB    1 TAB PO Q 6 H PRN PAIN, GREATER THAN 7 DAYS MEDICALLY NECESSARY    INSULIN DEGLUDEC (TRESIBA FLEXTOUCH U-200) 200 UNIT/ML (3 ML) INSULIN PEN    Inject 4 units under the skin daily    INSULIN PEN,REUSABLE,BT,ASPART (INPEN, NOVOLOG OR FIASP, PINK) INPN    To use with inpen cartridges    INSULIN PEN,REUSABLE,BT,LISPRO (INPEN, FOR HUMALOG, PINK) INPN    use as directed with inpen cartridge    KETOCONAZOLE (NIZORAL) 2 % CREAM    Apply to affected area DAILY    LACTOBACILLUS RHAMNOSUS GG (CULTURELLE) 10 BILLION CELL CAPSULE    Take 1 capsule by mouth once daily.    LAMIVUDINE (EPIVIR) 150 MG TAB    TAKE 1 TABLET BY MOUTH EVERY DAY    LIDOCAINE (LIDODERM) 5 %    Place 1 patch onto the skin once daily. Remove & Discard patch within 12 hours or as directed by MD    MULTIVITAMIN (THERAGRAN) PER TABLET    Take 1 tablet by mouth once daily.    MUPIROCIN (BACTROBAN) 2 % OINTMENT    APPLY TO LEFT ARM ULCER DAILY    NALOXONE (NARCAN) 4 MG/ACTUATION SPRY    USE AS DIRECTED INTRANASAL FOR SUSPECTED DRUG OVERDOSE    NITROGLYCERIN (NITROSTAT) 0.4 MG SL TABLET    PLACE 1 TABLET UNDER THE TONGUE EVERY 5 MINUTES AS NEEDED FOR CHEST PAIN.    NYSTATIN (MYCOSTATIN) POWDER    Apply topically 4 (four) times daily. Apply to intertriginous areas as directed up to four times daily to reduce moisture. for 14 days    ONDANSETRON (ZOFRAN) 4 MG TABLET    Take 1 tablet (4 mg total) by mouth every 8 (eight) hours as needed for Nausea.    POLYETHYLENE GLYCOL (GLYCOLAX) 17 GRAM/DOSE POWDER    Use cap to measure 17 grams, mix in liquid and take by mouth once daily.    PREDNISONE  (DELTASONE) 20 MG TABLET    Take 1 tablet by mouth daily    PROAIR HFA 90 MCG/ACTUATION INHALER           PMH:   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    forehead    Stroke     TIA     PSH:   Past Surgical History:   Procedure Laterality Date    CATARACT EXTRACTION W/  INTRAOCULAR LENS IMPLANT Right 3/28/2024    Procedure: EXTRACTION, CATARACT, WITH IOL INSERTION;  Surgeon: Hans Woodward MD;  Location: [facility] OR;  Service: Ophthalmology;  Laterality: Right;    CORONARY ANGIOGRAPHY N/A 3/10/2021    Procedure: ANGIOGRAM, CORONARY ARTERY - right radial;  Surgeon: Shemar Dempsey MD;  Location: [facility] CATH LAB;  Service: Cardiology;  Laterality: N/A;    CORONARY STENT PLACEMENT  03/10/2021    prox-mid RCA Marshal 4.5 x 26 mm, 4.5 x 12 mm    ESOPHAGOGASTRODUODENOSCOPY N/A 7/8/2024    Procedure: EGD (ESOPHAGOGASTRODUODENOSCOPY);  Surgeon: Janet Win MD;  Location: [facility] ENDO;  Service: Endoscopy;  Laterality: N/A;    FIXATION KYPHOPLASTY N/A 4/22/2024    Procedure: KYPHOPLASTY;  Surgeon: Stefan Stern MD;  Location: [facility] OR;  Service: Pain Management;  Laterality: N/A;  NO PROPOFOL    INCISION AND DRAINAGE OF SCROTUM N/A 11/15/2022    Procedure: INCISION AND DRAINAGE, SCROTUM;  Surgeon: William Hatch MD;  Location: [facility] OR;  Service: Urology;  Laterality: N/A;    INCISION AND DRAINAGE OF SCROTUM N/A 11/17/2022    Procedure: INCISION AND DRAINAGE, SCROTUM;  Surgeon: William Hatch MD;  Location: [facility] OR;  Service: Urology;  Laterality: N/A;    LUNG LOBECTOMY Right 2008    RUL lobectomy after removal of atrial myxoma     MAGNETIC RESONANCE IMAGING N/A 4/30/2024    Procedure: MRI (Magnetic Resonance Imagine);  Surgeon: Peace Watts;  Location: [facility];  Service: Anesthesiology;  Laterality: N/A;    PERITONEOCENTESIS N/A 7/2/2024    Procedure: PARACENTESIS, ABDOMINAL;  Surgeon: Ventura Jordan MD;  Location: [facility] CATH LAB;  Service: Radiology;  Laterality: N/A;    PLEURA BIOPSY      RESECTION OF ATRIAL MYXOMA  2007     Allergies: He is allergic to enbrel [etanercept], nsaids (non-steroidal anti-inflammatory drug), statins-hmg-coa reductase inhibitors, and pcn [penicillins].  Social History: Marital Status: single. He  reports that he has been smoking cigarettes. He has a 35 pack-year smoking history. He has never used smokeless tobacco.. He  reports that he does not currently use alcohol..       Exam:  VITAL SIGNS:   Vitals:    08/20/24 0530 08/20/24 0603 08/20/24 0732 08/20/24 0743   BP: (!) 147/97 135/89 117/70    Pulse: 98 98 107 (!) 112   Resp: 13 17 12 (!) 24   Temp:       TempSrc:       SpO2:  97%  96%   Weight:       Height:         Const: Awake and alert, NAD  Head: Atraumatic  Eyes: Normal Conjunctiva. R pupil 2 to 1mm, L pupil 3 to 2mm to light  ENT: Normal External Ears, Nose and Mouth.  Neck: Full range of motion. No meningismus.+diffuse midline c spine TTP, no stepoffs  Back: no midline T/L spine TTP, no stepoffs  Resp: Normal respiratory effort, No distress, CTAB  Cardio: Equal and intact distal pulses, tachycardic, regular rhythm  Abd: Soft, non tender, non distended.  Skin: No petechiae or rashes  Ext: No cyanosis, or edema  Neur: Awake and alert, strength and sensation intact, CN intact, no aphasia or dysarthria.   Psych: Normal Mood and Affect    Data:  Results for orders placed or performed during the hospital encounter of 08/19/24   CBC W/ AUTO DIFFERENTIAL   Result Value Ref Range    WBC 3.57 (L) 3.90 - 12.70 K/uL    RBC 3.86 (L) 4.60 - 6.20 M/uL    Hemoglobin 12.4 (L) 14.0 - 18.0 g/dL    Hematocrit 36.5  (L) 40.0 - 54.0 %    MCV 95 82 - 98 fL    MCH 32.1 (H) 27.0 - 31.0 pg    MCHC 34.0 32.0 - 36.0 g/dL    RDW 16.7 (H) 11.5 - 14.5 %    Platelets 255 150 - 450 K/uL    MPV 11.0 9.2 - 12.9 fL    Immature Granulocytes 0.3 0.0 - 0.5 %    Gran # (ANC) 1.6 (L) 1.8 - 7.7 K/uL    Immature Grans (Abs) 0.01 0.00 - 0.04 K/uL    Lymph # 1.5 1.0 - 4.8 K/uL    Mono # 0.3 0.3 - 1.0 K/uL    Eos # 0.1 0.0 - 0.5 K/uL    Baso # 0.08 0.00 - 0.20 K/uL    nRBC 1 (A) 0 /100 WBC    Gran % 44.3 38.0 - 73.0 %    Lymph % 43.1 18.0 - 48.0 %    Mono % 7.0 4.0 - 15.0 %    Eosinophil % 3.1 0.0 - 8.0 %    Basophil % 2.2 (H) 0.0 - 1.9 %    Differential Method Automated    Comprehensive metabolic panel   Result Value Ref Range    Sodium 135 (L) 136 - 145 mmol/L    Potassium 4.1 3.5 - 5.1 mmol/L    Chloride 103 95 - 110 mmol/L    CO2 24 23 - 29 mmol/L    Glucose 122 (H) 70 - 110 mg/dL    BUN 7 (L) 8 - 23 mg/dL    Creatinine 0.6 0.5 - 1.4 mg/dL    Calcium 7.6 (L) 8.7 - 10.5 mg/dL    Total Protein 5.9 (L) 6.0 - 8.4 g/dL    Albumin 1.5 (L) 3.5 - 5.2 g/dL    Total Bilirubin 1.0 0.1 - 1.0 mg/dL    Alkaline Phosphatase 450 (H) 55 - 135 U/L    AST 45 (H) 10 - 40 U/L    ALT 25 10 - 44 U/L    eGFR >60.0 >60 mL/min/1.73 m^2    Anion Gap 8 8 - 16 mmol/L   Protime-INR   Result Value Ref Range    Prothrombin Time 11.9 9.0 - 12.5 sec    INR 1.1 0.8 - 1.2   TSH   Result Value Ref Range    TSH 3.731 0.400 - 4.000 uIU/mL   LDL - Lipid Panel   Result Value Ref Range    Cholesterol 141 120 - 199 mg/dL    Triglycerides 188 (H) 30 - 150 mg/dL    HDL 24 (L) 40 - 75 mg/dL    LDL Cholesterol 79.4 63.0 - 159.0 mg/dL    HDL/Cholesterol Ratio 17.0 (L) 20.0 - 50.0 %    Total Cholesterol/HDL Ratio 5.9 (H) 2.0 - 5.0    Non-HDL Cholesterol 117 mg/dL   Urinalysis, Reflex to Urine Culture Urine, Clean Catch    Specimen: Urine   Result Value Ref Range    Specimen UA Urine, Clean Catch     Color, UA Yellow Yellow, Straw, Trice    Appearance, UA Clear Clear    pH, UA 7.0 5.0 - 8.0     Specific Gravity, UA 1.015 1.005 - 1.030    Protein, UA Negative Negative    Glucose, UA Negative Negative    Ketones, UA Negative Negative    Bilirubin (UA) Negative Negative    Occult Blood UA Negative Negative    Nitrite, UA Negative Negative    Leukocytes, UA Negative Negative   Lipase   Result Value Ref Range    Lipase 4 4 - 60 U/L   Troponin I   Result Value Ref Range    Troponin I 0.010 0.000 - 0.026 ng/mL   ISTAT CREATININE   Result Value Ref Range    POC Creatinine 0.5 0.5 - 1.4 mg/dL    Sample unknown      *Note: Due to a large number of results and/or encounters for the requested time period, some results have not been displayed. A complete set of results can be found in Results Review.     Imaging Results              CTA Head and Neck (xpd) (Final result)  Result time 08/20/24 00:54:51      Final result by Nithin Hurley MD (08/20/24 00:54:51)                   Impression:      No acute abnormality. No high-grade stenosis or major vessel occlusion. If the patient has an acute, focal neurological deficit, MRI of the brain may be indicated.    Atherosclerosis in the major arteries of the head and neck.    Other findings discussed in the body of the report.      Electronically signed by: Nithin Hurley MD  Date:    08/20/2024  Time:    00:54               Narrative:    EXAMINATION:  CTA HEAD AND NECK (XPD)    CLINICAL HISTORY:  Head trauma, moderate-severe;Transient ischemic attack (TIA);Fall 4d ago, +neck pain, aphasia today, since resolved.;    TECHNIQUE:  Non contrast low dose axial images were obtained through the head. CT angiogram was performed from the level of the nghia to the top of the head following the IV administration of 75 mL of Omnipaque 350.   Sagittal and coronal reconstructions and maximum intensity projection reconstructions were performed. Arterial stenosis percentages are based on NASCET measurement criteria.    CT source data was analyzed using artificial intelligence software  for detection of a large vessel occlusions (LVO) in order to enable computer assisted triage notification and aid clinical stroke decision making.    COMPARISON:  CTA chest, 2024.    FINDINGS:  CT HEAD:    No evidence of acute territorial infarct, hemorrhage, mass effect, or midline shift.  Mild generalized cerebral volume loss.    Ventricles are normal in size and configuration.    No displaced calvarial fracture.  There is a partially calcified lesion in the posterior right scalp.    Mucosal thickening in the right maxillary sinus.  No air-fluid levels.  Trace fluid in the bilateral mastoid air cells.    CTA HEAD AND NECK:    Soft tissues: No acute abnormality.  Chronic changes in the lung apices as seen on prior CTA chest.  Small pulmonary micro nodules in the lung apices.    Aorta: Calcified and noncalcified atherosclerotic plaque in the aortic arch as seen on prior CTA chest.  Three vessel aortic arch.  No severe stenosis at the origin of the great vessels.    Extracranial carotid circulation: Mild-to-moderate atherosclerosis.  No hemodynamically significant stenosis, aneurysmal dilatation, or dissection.    Extracranial vertebral circulation: Mild atherosclerosis.  No hemodynamically significant stenosis, aneurysmal dilatation, or dissection.    Intracranial Arteries: Atherosclerosis in the major arteries at the skull base including the internal carotid arteries.  No focal high-grade stenosis, occlusion, or aneurysm.    Venous structures (limited evaluation): No focal filling defect.                                    12-LEAD EKG INTERPRETATION BY ME:  Rate/Rhythm: Sinus tachycardia with rate of 102 beats per minute  QRS, ST, T-waves: TWI anterior septal leads, unchanged from prior  Impression: Sinus tachycardia    Labs & Imaging studies were reviewed independently by me.     Medical Decision Makinyo M presenting to the ER with concern for CVA. Symptoms resolved prior to arrival. Suspect TIA. He  "remained asymptomatic during his stay in the ER. CTA head/neck negative for LVO, ICH or acute traumatic c spine injury. He was given oral aspirin, will take baby aspirin at home. He refused MRI imaging, stable for DC with outpatient follow up with neurology. He did report chronic diarrhea and weight loss, albumin stable at baseline, no episodes in the ER. He follows with GI, encouraged to follow up with his specialist. No acute abdominal pain, N/V/D. Doubt cholecystitis, appendicitis, pancreatitis, SBO, diverticulitis, or any other acute abdominal surgical emergency. Triglycerides borderline high, patient allergic to statins, defer to PCP. HR slightly elevated, improved with 1L IVF, patient reports chronic elevation 90s-100s at home. I suspect his chronic back pain is contributing to his tachycardia. He refused admission and requested discharge home. Strict return precaution discussed, and patient is agreeable with the plan.     Critical Care Time: 35 minutes  Treatments/Evaluations: Close monitoring and treatment of unstable vital signs, cardiorespiratory, and neurologic status, while maintaining tight balance of fluid, respiratory, and cardiac interventions. This time includes discussing the case with the patient and the patients family. This time does not include all procedures stated elsewhere in this record. This time also includes reviewing old records, labs and radiological studies. This time includes examining and re-examining the patient. Additionally, this time also includes arranging care with admitting and consulting physicians.       Clinical Impression:  1. TIA (transient ischemic attack)    2. Acute focal neurological deficit    3. Malnutrition, unspecified type             Medication List        ASK your doctor about these medications      BD ULTRA-FINE RAHUL PEN NEEDLE 32 gauge x 5/32" Ndle  Generic drug: pen needle, diabetic  To use 6 daily     * diclofenac sodium 3 % gel  Commonly known as: " SOLARAZE  Apply to affected area three times a day.     * diclofenac sodium 3 % gel  Commonly known as: SOLARAZE  Apply topically to the affected area 3 (three) times daily.     digoxin 125 mcg tablet  Commonly known as: LANOXIN  TAKE 1 TABLET BY MOUTH EVERY DAY     ergocalciferol 50,000 unit Cap  Commonly known as: ERGOCALCIFEROL  Take 1 capsule (50,000 Units total) by mouth every 7 days.     * EScitalopram oxalate 20 MG tablet  Commonly known as: LEXAPRO     * EScitalopram oxalate 20 MG tablet  Commonly known as: LEXAPRO  TAKE 1 TABLET BY MOUTH EVERY DAY     folic acid 1 MG tablet  Commonly known as: FOLVITE  Take 1 tablet (1 mg total) by mouth once daily.     gabapentin 400 MG capsule  Commonly known as: NEURONTIN  Take 1 capsule (400 mg total) by mouth 3 (three) times daily.     * HYDROcodone-acetaminophen 7.5-325 mg per tablet  Commonly known as: NORCO  Take 1 tablet by mouth every 6 (six) hours as needed for Pain.     * HYDROcodone-acetaminophen 7.5-300 mg Tab  1 TAB PO Q 6 H PRN PAIN, GREATER THAN 7 DAYS MEDICALLY NECESSARY     * HYDROcodone-acetaminophen 5-325 mg per tablet  Commonly known as: NORCO  TAKE 1 TABLET BY MOUTH EVERY 6 HOURS AS NEEDED FOR PAIN.  GREATER THAN 7 DAYS MEDICALLY NECESSARY.  CONTINUING TO WEAN OFF ON THIS RX.     * HYDROcodone-acetaminophen  mg per tablet  Commonly known as: NORCO  Take 1 tab by mouth every 6 hours as needed for severe pain     INPEN (FOR HUMALOG) PINK Inpn  Generic drug: insulin pen,reusable,BT,lispro  use as directed with inpen cartridge     InPen (NovoLOG or Fiasp) pink Inpn  Generic drug: insulin pen,reusable,BT,aspart  To use with inpen cartridges     ketoconazole 2 % cream  Commonly known as: NIZORAL  Apply to affected area DAILY     Lactobacillus rhamnosus GG 10 billion cell capsule  Commonly known as: CULTURELLE  Take 1 capsule by mouth once daily.     lamiVUDine 150 MG Tab  Commonly known as: EPIVIR  TAKE 1 TABLET BY MOUTH EVERY DAY     LIDOcaine 5  %  Commonly known as: LIDODERM  Place 1 patch onto the skin once daily. Remove & Discard patch within 12 hours or as directed by MD     multivitamin per tablet  Commonly known as: THERAGRAN     mupirocin 2 % ointment  Commonly known as: BACTROBAN  APPLY TO LEFT ARM ULCER DAILY     naloxone 4 mg/actuation Spry  Commonly known as: NARCAN  USE AS DIRECTED INTRANASAL FOR SUSPECTED DRUG OVERDOSE     nitroGLYCERIN 0.4 MG SL tablet  Commonly known as: NITROSTAT  PLACE 1 TABLET UNDER THE TONGUE EVERY 5 MINUTES AS NEEDED FOR CHEST PAIN.     nystatin powder  Commonly known as: MYCOSTATIN  Apply topically 4 (four) times daily. Apply to intertriginous areas as directed up to four times daily to reduce moisture. for 14 days     ondansetron 4 MG tablet  Commonly known as: ZOFRAN  Take 1 tablet (4 mg total) by mouth every 8 (eight) hours as needed for Nausea.     polyethylene glycol 17 gram/dose powder  Commonly known as: GLYCOLAX  Use cap to measure 17 grams, mix in liquid and take by mouth once daily.     predniSONE 20 MG tablet  Commonly known as: DELTASONE  Take 1 tablet by mouth daily     PROAIR HFA 90 mcg/actuation inhaler  Generic drug: albuterol     TRESIBA FLEXTOUCH U-200 200 unit/mL (3 mL) insulin pen  Generic drug: insulin degludec  Inject 4 units under the skin daily           * This list has 8 medication(s) that are the same as other medications prescribed for you. Read the directions carefully, and ask your doctor or other care provider to review them with you.                  Follow-up Information       [facility].    Specialty: Neurology  Contact information:  [address]  [facility] [address] [phone]  Additional information:  8th Floor [facility]   Please park in [facility].   [facility] Please take the C elevator to 8th floor which opens to the lobby.                             Ana Paula Maldonado MD  08/20/24 7930

## 2024-08-20 NOTE — ED NOTES
Pt provided new outfit from care closet. Pt going to try sleeping while he waits for ride home. Denies needs at this time. VSS.

## 2024-08-20 NOTE — ED NOTES
Nurses Note -- 4 Eyes      8/20/2024   8:43 AM      Skin assessed during: Q Shift Change      [] No Altered Skin Integrity Present    []Prevention Measures Documented      [x] Yes- Altered Skin Integrity Present or Discovered   [x] LDA Added if Not in Epic (Describe Wound)   [x] New Altered Skin Integrity was Present on Admit and Documented in LDA   [x] Wound Image Taken    Wound Care Consulted? No    Attending Nurse:  Lissette Owens RN/Staff Member:  Nichole

## 2024-08-26 ENCOUNTER — OFFICE VISIT (OUTPATIENT)
Dept: GASTROENTEROLOGY | Facility: CLINIC | Age: 69
End: 2024-08-26
Payer: MEDICARE

## 2024-08-26 VITALS — WEIGHT: 155 LBS | BODY MASS INDEX: 21.7 KG/M2 | HEIGHT: 71 IN

## 2024-08-26 DIAGNOSIS — R74.8 ELEVATED LIVER ENZYMES: ICD-10-CM

## 2024-08-26 DIAGNOSIS — E46 MALNUTRITION, UNSPECIFIED TYPE: ICD-10-CM

## 2024-08-26 DIAGNOSIS — R11.2 NAUSEA AND VOMITING, UNSPECIFIED VOMITING TYPE: ICD-10-CM

## 2024-08-26 DIAGNOSIS — R63.4 WEIGHT LOSS: ICD-10-CM

## 2024-08-26 DIAGNOSIS — K52.9 COLITIS: Primary | ICD-10-CM

## 2024-08-26 DIAGNOSIS — R19.7 DIARRHEA, UNSPECIFIED TYPE: ICD-10-CM

## 2024-08-26 DIAGNOSIS — E13.65 OTHER SPECIFIED DIABETES MELLITUS WITH HYPERGLYCEMIA, WITHOUT LONG-TERM CURRENT USE OF INSULIN: ICD-10-CM

## 2024-08-26 PROCEDURE — 1125F AMNT PAIN NOTED PAIN PRSNT: CPT | Mod: CPTII,GC,S$GLB, | Performed by: STUDENT IN AN ORGANIZED HEALTH CARE EDUCATION/TRAINING PROGRAM

## 2024-08-26 PROCEDURE — 3051F HG A1C>EQUAL 7.0%<8.0%: CPT | Mod: CPTII,GC,S$GLB, | Performed by: STUDENT IN AN ORGANIZED HEALTH CARE EDUCATION/TRAINING PROGRAM

## 2024-08-26 PROCEDURE — 4010F ACE/ARB THERAPY RXD/TAKEN: CPT | Mod: CPTII,GC,S$GLB, | Performed by: STUDENT IN AN ORGANIZED HEALTH CARE EDUCATION/TRAINING PROGRAM

## 2024-08-26 PROCEDURE — 1100F PTFALLS ASSESS-DOCD GE2>/YR: CPT | Mod: CPTII,GC,S$GLB, | Performed by: STUDENT IN AN ORGANIZED HEALTH CARE EDUCATION/TRAINING PROGRAM

## 2024-08-26 PROCEDURE — 3066F NEPHROPATHY DOC TX: CPT | Mod: CPTII,GC,S$GLB, | Performed by: STUDENT IN AN ORGANIZED HEALTH CARE EDUCATION/TRAINING PROGRAM

## 2024-08-26 PROCEDURE — 99205 OFFICE O/P NEW HI 60 MIN: CPT | Mod: GC,S$GLB,, | Performed by: STUDENT IN AN ORGANIZED HEALTH CARE EDUCATION/TRAINING PROGRAM

## 2024-08-26 PROCEDURE — 3061F NEG MICROALBUMINURIA REV: CPT | Mod: CPTII,GC,S$GLB, | Performed by: STUDENT IN AN ORGANIZED HEALTH CARE EDUCATION/TRAINING PROGRAM

## 2024-08-26 PROCEDURE — 1159F MED LIST DOCD IN RCRD: CPT | Mod: CPTII,GC,S$GLB, | Performed by: STUDENT IN AN ORGANIZED HEALTH CARE EDUCATION/TRAINING PROGRAM

## 2024-08-26 PROCEDURE — 3008F BODY MASS INDEX DOCD: CPT | Mod: CPTII,GC,S$GLB, | Performed by: STUDENT IN AN ORGANIZED HEALTH CARE EDUCATION/TRAINING PROGRAM

## 2024-08-26 PROCEDURE — 99999 PR PBB SHADOW E&M-EST. PATIENT-LVL III: CPT | Mod: PBBFAC,GC,, | Performed by: STUDENT IN AN ORGANIZED HEALTH CARE EDUCATION/TRAINING PROGRAM

## 2024-08-26 PROCEDURE — 3288F FALL RISK ASSESSMENT DOCD: CPT | Mod: CPTII,GC,S$GLB, | Performed by: STUDENT IN AN ORGANIZED HEALTH CARE EDUCATION/TRAINING PROGRAM

## 2024-08-26 NOTE — PROGRESS NOTES
[facility]    Reason for visit: There were no encounter diagnoses.  Referring Provider/PCP: Tushar Drew MD    History of Present Illness:  Nithin Wagner is a 69 y.o. male with history of Hepatitis B, NAFLD with possible cirrhosis (given presence of ascites) DM, HTN, chronic back pain (on opioids) presenting to GI clinic with weight loss. He estimates losing at least 100 lbs in the past year. About 6 months ago, he started to experience nausea, vomiting and diarrhea. He estimates several episodes of diarrhea daily, which started after his opioids were stopped. To help control the diarrhea, he started taking Imodium and now has a liquid bowel movement every 3 days.     On 6/26, he presented to [facility] with weight loss, nausea, vomiting and diarrhea. CTAP with IV contrast showed diffuse colitis and gastroduodenitis, diffuse hepatic steatosis. He was offered hospital admission but wanted to be discharged home instead. He then re-presented to [facility] for the same symptoms and was admitted from 6/28 to 7/14. Due to elevated liver enzymes, MRI MRCP obtained on 7/01 showed gallbladder distension, hepatic steatosis and large amount of steatosis. He underwent paracentesis with removal of 1250 mL, negative for SBP. He underwent EGD on 7/08, which showed white plaques in the esophagus, gastritis, duodenitis. Pathology with foveolar metaplasia of the duodenum, inactive gastritis negative for metaplasia, dysplasia or H.pylori and intestinal metaplasia of the squamocolumnar mucosa. He was temporarily on TPN during this hospital stay due to malnutrition and difficulty with tolerating PO. He was discharged but then re-presented to the ED on 7/29 with similar symptoms; CTAP with IV contrast with circumferential wall and fold thickening of the right colon and transverse colon, ileitis. CTA chest without PE but did have bilateral pleural effusions, patchy nodular opacities bilaterally. He  was recently in the ED again on 8/20 with aphasia, diagnosed with a TIA and discharged home.     Since hospital discharge, he is still losing weight and worried about his nutrition. He cannot take in more than 500 calories per day. If he tries to eat beyond this, he experiences nausea and vomiting. The abdominal pain has improved since his hospital admission. No family history history of colon or rectal cancer.     PEndoHx:  Reported colonoscopy which he thinks was over 10 years ago, unsure of results.     Review of Systems   Constitutional:  Positive for malaise/fatigue and weight loss.   Gastrointestinal:  Positive for constipation, diarrhea, nausea and vomiting.     Medical History:  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    forehead    Stroke     TIA       Past Surgical History:   Procedure Laterality Date    CATARACT EXTRACTION W/  INTRAOCULAR LENS IMPLANT Right 3/28/2024    Procedure: EXTRACTION, CATARACT, WITH IOL INSERTION;  Surgeon: Hans Woodward MD;  Location: [facility] OR;  Service: Ophthalmology;  Laterality: Right;    CORONARY ANGIOGRAPHY N/A 3/10/2021    Procedure: ANGIOGRAM, CORONARY ARTERY - right radial;  Surgeon: Shemar Dempsey MD;  Location: [facility] CATH LAB;  Service: Cardiology;  Laterality: N/A;    CORONARY STENT PLACEMENT  03/10/2021    prox-mid RCA Marshal 4.5 x 26 mm, 4.5 x 12 mm    ESOPHAGOGASTRODUODENOSCOPY N/A 7/8/2024    Procedure: EGD (ESOPHAGOGASTRODUODENOSCOPY);  Surgeon: Janet Win MD;  Location: [facility] ENDO;  Service: Endoscopy;  Laterality: N/A;    FIXATION KYPHOPLASTY N/A 4/22/2024     Procedure: KYPHOPLASTY;  Surgeon: Stefan Stern MD;  Location: [facility] OR;  Service: Pain Management;  Laterality: N/A;  NO PROPOFOL    INCISION AND DRAINAGE OF SCROTUM N/A 11/15/2022    Procedure: INCISION AND DRAINAGE, SCROTUM;  Surgeon: William Hatch MD;  Location: [facility] OR;  Service: Urology;  Laterality: N/A;    INCISION AND DRAINAGE OF SCROTUM N/A 11/17/2022    Procedure: INCISION AND DRAINAGE, SCROTUM;  Surgeon: William Hatch MD;  Location: [facility] OR;  Service: Urology;  Laterality: N/A;    LUNG LOBECTOMY Right 2008    RUL lobectomy after removal of atrial myxoma    MAGNETIC RESONANCE IMAGING N/A 4/30/2024    Procedure: MRI (Magnetic Resonance Imagine);  Surgeon: Peace Watts;  Location: [facility];  Service: Anesthesiology;  Laterality: N/A;    PERITONEOCENTESIS N/A 7/2/2024    Procedure: PARACENTESIS, ABDOMINAL;  Surgeon: Ventura Jordan MD;  Location: [facility] CATH LAB;  Service: Radiology;  Laterality: N/A;    PLEURA BIOPSY      RESECTION OF ATRIAL MYXOMA  2007       Family History   Problem Relation Name Age of Onset    Hodgkin's lymphoma Mother      Diabetes type II Mother      Kidney failure Father      Diabetes type I Father      Cancer Sister         Social History     Socioeconomic History    Marital status: Single   Occupational History    Occupation: , respiratory therapist, founded [place]     Comment: Retired   Tobacco Use    Smoking status: Every Day     Current packs/day: 1.00     Average packs/day: 1 pack/day for 35.0 years (35.0 ttl pk-yrs)     Types: Cigarettes    Smokeless tobacco: Never   Substance and Sexual Activity    Alcohol use: Not Currently    Drug use: No    Sexual activity: Never     Social Determinants of Health     Financial Resource Strain: Low Risk  (6/29/2024)    Overall Financial Resource Strain (CARDIA)     Difficulty of Paying Living Expenses: Not very hard   Food Insecurity: No Food Insecurity (6/29/2024)    Hunger Vital Sign     Worried About  "Running Out of Food in the Last Year: Never true     Ran Out of Food in the Last Year: Never true   Transportation Needs: No Transportation Needs (6/29/2024)    TRANSPORTATION NEEDS     Transportation : No   Physical Activity: Inactive (6/29/2024)    Exercise Vital Sign     Days of Exercise per Week: 0 days     Minutes of Exercise per Session: 0 min   Stress: No Stress Concern Present (6/29/2024)    [employer] [place] [employer] - Occupational Stress Questionnaire     Feeling of Stress : Only a little   Recent Concern: Stress - Stress Concern Present (4/30/2024)    [employer] [place] [employer] - Occupational Stress Questionnaire     Feeling of Stress : To some extent   Housing Stability: Low Risk  (6/29/2024)    Housing Stability Vital Sign     Unable to Pay for Housing in the Last Year: No     Homeless in the Last Year: No       Current Outpatient Medications on File Prior to Visit   Medication Sig Dispense Refill    BD ULTRA-FINE RAHUL PEN NEEDLE 32 gauge x 5/32" Ndle To use 6 daily 400 each 3    diclofenac sodium (SOLARAZE) 3 % gel Apply to affected area three times a day. 100 g 5    diclofenac sodium (SOLARAZE) 3 % gel Apply topically to the affected area 3 (three) times daily. 100 g 5    digoxin (LANOXIN) 125 mcg tablet TAKE 1 TABLET BY MOUTH EVERY DAY 90 tablet 3    ergocalciferol (ERGOCALCIFEROL) 50,000 unit Cap Take 1 capsule (50,000 Units total) by mouth every 7 days. 12 capsule 3    EScitalopram oxalate (LEXAPRO) 20 MG tablet Take 20 mg by mouth every evening.      EScitalopram oxalate (LEXAPRO) 20 MG tablet TAKE 1 TABLET BY MOUTH EVERY DAY 30 tablet 6    folic acid (FOLVITE) 1 MG tablet Take 1 tablet (1 mg total) by mouth once daily. 30 tablet 5    gabapentin (NEURONTIN) 400 MG capsule Take 1 capsule (400 mg total) by mouth 3 (three) times daily. 90 capsule 11    HYDROcodone-acetaminophen (NORCO)  mg per tablet Take 1 tab by mouth every 6 hours as needed for severe pain 80 " tablet 0    HYDROcodone-acetaminophen (NORCO) 5-325 mg per tablet TAKE 1 TABLET BY MOUTH EVERY 6 HOURS AS NEEDED FOR PAIN.  GREATER THAN 7 DAYS MEDICALLY NECESSARY.  CONTINUING TO WEAN OFF ON THIS RX. 60 tablet 0    HYDROcodone-acetaminophen (NORCO) 7.5-325 mg per tablet Take 1 tablet by mouth every 6 (six) hours as needed for Pain. 20 tablet 0    HYDROcodone-acetaminophen 7.5-300 mg Tab 1 TAB PO Q 6 H PRN PAIN, GREATER THAN 7 DAYS MEDICALLY NECESSARY 30 tablet 0    insulin degludec (TRESIBA FLEXTOUCH U-200) 200 unit/mL (3 mL) insulin pen Inject 4 units under the skin daily 3 Pen 3    insulin pen,reusable,BT,aspart (INPEN, NOVOLOG OR FIASP, PINK) InPn To use with inpen cartridges 1 each 1    insulin pen,reusable,BT,lispro (INPEN, FOR HUMALOG, PINK) InPn use as directed with inpen cartridge 1 each 1    ketoconazole (NIZORAL) 2 % cream Apply to affected area DAILY 30 g 3    Lactobacillus rhamnosus GG (CULTURELLE) 10 billion cell capsule Take 1 capsule by mouth once daily. 30 capsule 2    lamiVUDine (EPIVIR) 150 MG Tab TAKE 1 TABLET BY MOUTH EVERY DAY 30 tablet 23    LIDOcaine (LIDODERM) 5 % Place 1 patch onto the skin once daily. Remove & Discard patch within 12 hours or as directed by MD 30 patch 2    multivitamin (THERAGRAN) per tablet Take 1 tablet by mouth once daily.      mupirocin (BACTROBAN) 2 % ointment APPLY TO LEFT ARM ULCER DAILY 22 g 2    naloxone (NARCAN) 4 mg/actuation Spry USE AS DIRECTED INTRANASAL FOR SUSPECTED DRUG OVERDOSE 1 each 1    nitroGLYCERIN (NITROSTAT) 0.4 MG SL tablet PLACE 1 TABLET UNDER THE TONGUE EVERY 5 MINUTES AS NEEDED FOR CHEST PAIN. 100 tablet 2    nystatin (MYCOSTATIN) powder Apply topically 4 (four) times daily. Apply to intertriginous areas as directed up to four times daily to reduce moisture. for 14 days 30 g 0    ondansetron (ZOFRAN) 4 MG tablet Take 1 tablet (4 mg total) by mouth every 8 (eight) hours as needed for Nausea. 20 tablet 0    polyethylene glycol (GLYCOLAX) 17  gram/dose powder Use cap to measure 17 grams, mix in liquid and take by mouth once daily. 510 g 2    predniSONE (DELTASONE) 20 MG tablet Take 1 tablet by mouth daily 30 tablet 3    PROAIR HFA 90 mcg/actuation inhaler        No current facility-administered medications on file prior to visit.       Review of patient's allergies indicates:   Allergen Reactions    Enbrel [etanercept] Shortness Of Breath     CHF    Nsaids (non-steroidal anti-inflammatory drug) Other (See Comments)     hypertention    Other reaction(s): Other (See Comments)    HTN    Patient states he tolerates anti inflammatory eye drops    Statins-hmg-coa reductase inhibitors Other (See Comments)     Heart arrythemias    Other reaction(s): Other (See Comments)    Joint pain and cardiac arrythmias    Pcn [penicillins] Rash       Physical Exam  Vitals reviewed.   Constitutional:       General: He is not in acute distress.     Appearance: He is ill-appearing. He is not diaphoretic.   Eyes:      General: No scleral icterus.  Pulmonary:      Effort: Pulmonary effort is normal. No respiratory distress.   Abdominal:      General: There is no distension.   Skin:     Coloration: Skin is not jaundiced.   Neurological:      Mental Status: He is alert. Mental status is at baseline.       Laboratory:  Lab Results   Component Value Date     (L) 08/19/2024    K 4.1 08/19/2024     08/19/2024    CO2 24 08/19/2024    BUN 7 (L) 08/19/2024    CREATININE 0.6 08/19/2024    CALCIUM 7.6 (L) 08/19/2024    ANIONGAP 8 08/19/2024    ESTGFRAFRICA >60 07/21/2022    EGFRNONAA >60 07/21/2022       Lab Results   Component Value Date    ALT 25 08/19/2024    AST 45 (H) 08/19/2024     (H) 06/30/2024    ALKPHOS 450 (H) 08/19/2024    BILITOT 1.0 08/19/2024    ALBUMIN 1.5 (L) 08/19/2024    LIPASE 4 08/19/2024       Lab Results   Component Value Date    WBC 3.57 (L) 08/19/2024    HGB 12.4 (L) 08/19/2024    HCT 36.5 (L) 08/19/2024    MCV 95 08/19/2024     08/19/2024      Microbiology:   Latest Reference Range & Units 06/30/24 09:15   C. diff Antigen Negative  Negative   C difficile Toxins A+B, EIA Negative  Negative     Imaging:  See above.     Assessment:  Nithin Wagner is a 69 y.o. male with Hepatitis B, NAFLD with possible cirrhosis (given presence of ascites) DM, HTN, chronic back pain (on opioids) presenting to GI clinic with weight loss, nausea, vomiting, diarrhea and abdominal pain for several months. He has an EGD in July which was relatively unremarkable. Ideally the next step would be to perform a colonoscopy given his ongoing GI symptoms, weight loss, abnormal CT findings, which are concerning for possible malignancy. Unfortunately, he has difficulty ambulating and requires assistance with transferring, which would make prepping for a colonoscopy extremely difficult. Overall his abdominal pain, nausea, vomiting and diarrhea have all improved but he still has difficulty with PO intake. His liver enzymes, especially his ALP has been uptrending since May of this year, and he has not seen hepatology since 2021.     Problems:  Weight loss  Severe protein calorie malnutrition  Diarrhea  Nausea and vomiting   Elevated liver enzymes   Colitis seen on imaging     Plan:  Ambulatory referral to dietician given significant weight loss.   Stool calprotectin and GI pathogen panel.   Referral back to Hepatology to discuss liver enzyme elevation.   Follow up in about 3 months (around 11/26/2024).    Rhianna Wagner MD  Gastroenterology Fellow    This patient was discussed with Dr. Avilez.

## 2024-08-26 NOTE — PROGRESS NOTES
"GENERAL GI PATIENT INTAKE:    COVID symptoms in the last 7 days (runny nose, sore throat, congestion, cough, fever): No  PCP: Tushar Drew  If not PCP-  number given to establish [phone]: N/A    ALLERGIES REVIEWED:  Yes    CHIEF COMPLAINT:    Chief Complaint   Patient presents with    Weight Loss    Constipation       VITAL SIGNS:  Ht 5' 11" (1.803 m)   Wt 70.3 kg (155 lb)   BMI 21.62 kg/m²      Change in medical, surgical, family or social history: No      REVIEWED MEDICATION LIST RECONCILED INCLUDING ABOVE MEDS:  Yes     "

## 2024-08-26 NOTE — Clinical Note
Hi! Just letting you know I saw him in clinic today. Dr. Avilez would like him to follow back up with you given his elevated LFTs (ALP in the 800s). Thank you

## 2024-08-30 ENCOUNTER — TELEPHONE (OUTPATIENT)
Dept: HEPATOLOGY | Facility: CLINIC | Age: 69
End: 2024-08-30
Payer: MEDICARE

## 2024-08-30 NOTE — TELEPHONE ENCOUNTER
----- Message from Nithin Garza MD sent at 8/30/2024  9:11 AM CDT -----  Can you bring him to clinic please?   Non urgent appointment  ----- Message -----  From: Rhianna Wagner MD  Sent: 8/26/2024   6:30 PM CDT  To: Nithin Garza MD    Hi! Just letting you know I saw him in clinic today. Dr. Avilez would like him to follow back up with you given his elevated LFTs (ALP in the 800s). Thank you

## 2024-08-30 NOTE — TELEPHONE ENCOUNTER
Contacted the patient and patient stated that he is losing weight rapidly and lose 50 pounds.He wanted to be seen soonest I schedule his virtual appt on 10/2/2024.

## 2024-09-01 ENCOUNTER — HOSPITAL ENCOUNTER (EMERGENCY)
Facility: OTHER | Age: 69
Discharge: HOME | End: 2024-09-01
Attending: EMERGENCY MEDICINE
Payer: MEDICARE

## 2024-09-01 VITALS
HEIGHT: 71 IN | BODY MASS INDEX: 21.7 KG/M2 | SYSTOLIC BLOOD PRESSURE: 123 MMHG | DIASTOLIC BLOOD PRESSURE: 80 MMHG | HEART RATE: 96 BPM | WEIGHT: 155 LBS | OXYGEN SATURATION: 97 % | TEMPERATURE: 98 F | RESPIRATION RATE: 19 BRPM

## 2024-09-01 DIAGNOSIS — S70.02XA CONTUSION OF LEFT HIP, INITIAL ENCOUNTER: Primary | ICD-10-CM

## 2024-09-01 DIAGNOSIS — M25.552 LEFT HIP PAIN: ICD-10-CM

## 2024-09-01 PROCEDURE — 25000003 PHARM REV CODE 250: Performed by: EMERGENCY MEDICINE

## 2024-09-01 PROCEDURE — 99283 EMERGENCY DEPT VISIT LOW MDM: CPT | Mod: 25

## 2024-09-01 RX ORDER — ACETAMINOPHEN 500 MG
1000 TABLET ORAL
Status: COMPLETED | OUTPATIENT
Start: 2024-09-01 | End: 2024-09-01

## 2024-09-01 RX ORDER — KETOROLAC TROMETHAMINE 10 MG/1
10 TABLET, FILM COATED ORAL
Status: COMPLETED | OUTPATIENT
Start: 2024-09-01 | End: 2024-09-01

## 2024-09-01 RX ORDER — LIDOCAINE 50 MG/G
1 PATCH TOPICAL
Status: DISCONTINUED | OUTPATIENT
Start: 2024-09-01 | End: 2024-09-01 | Stop reason: HOSPADM

## 2024-09-01 RX ADMIN — ACETAMINOPHEN 1000 MG: 500 TABLET ORAL at 02:09

## 2024-09-01 RX ADMIN — LIDOCAINE 1 PATCH: 50 PATCH CUTANEOUS at 03:09

## 2024-09-01 RX ADMIN — KETOROLAC TROMETHAMINE 10 MG: 10 TABLET, FILM COATED ORAL at 04:09

## 2024-09-01 NOTE — ED TRIAGE NOTES
Pt presents to ED via EMS w/ c/o L hip pain and R shoulder pain after falling while trying to transfer from his wheelchair. Pt denies LOC or hitting his head. Pt denies any other complaints at this time. AAOx4. NAD noted.

## 2024-09-01 NOTE — ED PROVIDER NOTES
Encounter Date: 9/1/2024       History     Chief Complaint   Patient presents with    Fall     Patient presents to the ED via [facility] with reports of having fallen while trying to transfer from wheel chair and hit his left hip on the wall. EMS notes redness to the left him. Denies any shortening or rotation.      69-year-old male with history of CAD, diabetes, heart failure, RA, hypertension, hyperlipidemia, and TIA presents via EMS for evaluation following a fall.  The patient hit his left hip on the wall or door knob while he was standing urinating using a urinal.  He denies hitting his head.  He denies pain other than at his left hip.  He reports taking ibuprofen over 3 hours ago with no relief.  He also thinks he may be impacted because he was not had a bowel movement in 3 days.        Review of patient's allergies indicates:   Allergen Reactions    Enbrel [etanercept] Shortness Of Breath     CHF    Nsaids (non-steroidal anti-inflammatory drug) Other (See Comments)     hypertention    Other reaction(s): Other (See Comments)    HTN    Patient states he tolerates anti inflammatory eye drops    Statins-hmg-coa reductase inhibitors Other (See Comments)     Heart arrythemias    Other reaction(s): Other (See Comments)    Joint pain and cardiac arrythmias    Pcn [penicillins] Rash     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    forehead    Stroke     TIA     Past Surgical History:   Procedure Laterality Date    CATARACT EXTRACTION W/  INTRAOCULAR LENS IMPLANT Right 3/28/2024    Procedure: EXTRACTION, CATARACT, WITH IOL  INSERTION;  Surgeon: Hans Woodward MD;  Location: [facility] OR;  Service: Ophthalmology;  Laterality: Right;    CORONARY ANGIOGRAPHY N/A 3/10/2021    Procedure: ANGIOGRAM, CORONARY ARTERY - right radial;  Surgeon: Shemar Dempsey MD;  Location: [facility] CATH LAB;  Service: Cardiology;  Laterality: N/A;    CORONARY STENT PLACEMENT  03/10/2021    prox-mid RCA Marshal 4.5 x 26 mm, 4.5 x 12 mm    ESOPHAGOGASTRODUODENOSCOPY N/A 7/8/2024    Procedure: EGD (ESOPHAGOGASTRODUODENOSCOPY);  Surgeon: Janet Win MD;  Location: [facility] ENDO;  Service: Endoscopy;  Laterality: N/A;    FIXATION KYPHOPLASTY N/A 4/22/2024    Procedure: KYPHOPLASTY;  Surgeon: Stefan Stern MD;  Location: [facility] OR;  Service: Pain Management;  Laterality: N/A;  NO PROPOFOL    INCISION AND DRAINAGE OF SCROTUM N/A 11/15/2022    Procedure: INCISION AND DRAINAGE, SCROTUM;  Surgeon: William Hatch MD;  Location: [facility] OR;  Service: Urology;  Laterality: N/A;    INCISION AND DRAINAGE OF SCROTUM N/A 11/17/2022    Procedure: INCISION AND DRAINAGE, SCROTUM;  Surgeon: William Hatch MD;  Location: [facility];  Service: Urology;  Laterality: N/A;    LUNG LOBECTOMY Right 2008    RUL lobectomy after removal of atrial myxoma    MAGNETIC RESONANCE IMAGING N/A 4/30/2024    Procedure: MRI (Magnetic Resonance Imagine);  Surgeon: Peace Watts;  Location: [facility];  Service: Anesthesiology;  Laterality: N/A;    PERITONEOCENTESIS N/A 7/2/2024    Procedure: PARACENTESIS, ABDOMINAL;  Surgeon: Ventura Jordan MD;  Location: [facility] CATH LAB;  Service: Radiology;  Laterality: N/A;    PLEURA BIOPSY      RESECTION OF ATRIAL MYXOMA  2007     Family History   Problem Relation Name Age of Onset    Hodgkin's lymphoma Mother      Diabetes type II Mother      Kidney failure Father      Diabetes type I Father      Cancer Sister       Social History     Tobacco Use    Smoking status: Every Day     Current packs/day: 1.00     Average packs/day: 1 pack/day for 35.0 years (35.0 ttl pk-yrs)      Types: Cigarettes    Smokeless tobacco: Never   Substance Use Topics    Alcohol use: Not Currently    Drug use: No     Review of Systems    Physical Exam     Initial Vitals [09/01/24 0145]   BP Pulse Resp Temp SpO2   122/80 107 20 97.6 °F (36.4 °C) 100 %      MAP       --         Physical Exam    Nursing note and vitals reviewed.  Constitutional: He appears well-developed and well-nourished. He is not diaphoretic. No distress.   HENT:   Head: Normocephalic and atraumatic.   Right Ear: External ear normal.   Left Ear: External ear normal.   Nose: Nose normal.   Eyes: Conjunctivae and EOM are normal. Right eye exhibits no discharge. Left eye exhibits no discharge.   Neck: Neck supple.   Normal range of motion.  Cardiovascular:  Normal rate, regular rhythm and normal heart sounds.     Exam reveals no gallop and no friction rub.       No murmur heard.  Pulmonary/Chest: Breath sounds normal. No respiratory distress. He has no wheezes. He has no rhonchi. He has no rales.   Abdominal: Abdomen is soft. He exhibits no distension. There is no abdominal tenderness. There is no guarding.   Musculoskeletal:         General: No edema. Normal range of motion.      Cervical back: Normal range of motion and neck supple.      Comments: Pelvis stable.  Full range of motion of both hips and knees.  Abrasion to the anterior aspect of the left hip.     Neurological: He is alert and oriented to person, place, and time. He has normal strength.         ED Course   Procedures  Labs Reviewed - No data to display       Imaging Results              X-Ray Hip 2 or 3 views Left with Pelvis when performed (In process)                   X-Rays:   Independently Interpreted Readings:   Other Readings:  Hip x-ray:  No displaced fracture or dislocation.    Medications   LIDOcaine 5 % patch 1 patch (1 patch Transdermal Patch Applied 9/1/24 1497)   ketorolac tablet 10 mg (has no administration in time range)   acetaminophen tablet 1,000 mg (1,000 mg Oral  Given 9/1/24 7179)     Medical Decision Making  Ddx:  Hip contusion, hip fracture, hip dislocation, constipation    Will obtain x-ray of the hip and pelvis and give Tylenol  and lidocaine patch for pain    Per virtual Radiology, x-ray shows no evidence of acute fracture.  Given the patient's concern for fecal impaction, a rectal exam was performed and no stool impaction noted.  The patient will be discharged at this time in stable condition to follow-up with his primary care doctor as needed.  Tylenol as needed for pain.    Amount and/or Complexity of Data Reviewed  Radiology: ordered.    Risk  OTC drugs.  Prescription drug management.                                      Clinical Impression:  Final diagnoses:  [M25.552] Left hip pain  [S70.02XA] Contusion of left hip, initial encounter (Primary)          ED Disposition Condition    Discharge Stable          ED Prescriptions    None       Follow-up Information    None          Harriett Golden MD  09/01/24 0423

## 2024-09-01 NOTE — ED NOTES
Report given by BE Plunkett. Assumed care of this patient. Received patient A&Ox4. Patient updated regarding ETA of transportation. Denies any complaints at this time. VSS. Safety measures in place; Maintained side rails up x2. Call light within pt reach. Plan of care ongoing. Still awaiting transportation for discharge.

## 2024-09-03 ENCOUNTER — LAB VISIT (OUTPATIENT)
Dept: LAB | Facility: HOSPITAL | Age: 69
End: 2024-09-03
Payer: MEDICARE

## 2024-09-03 ENCOUNTER — PATIENT OUTREACH (OUTPATIENT)
Dept: EMERGENCY MEDICINE | Facility: OTHER | Age: 69
End: 2024-09-03
Payer: MEDICARE

## 2024-09-03 DIAGNOSIS — K52.9 COLITIS: ICD-10-CM

## 2024-09-03 DIAGNOSIS — R19.7 DIARRHEA, UNSPECIFIED TYPE: ICD-10-CM

## 2024-09-03 LAB
ASTROVIRUS: NOT DETECTED
C COLI+JEJ+UPSA DNA STL QL NAA+NON-PROBE: NOT DETECTED
CYCLOSPORA CAYETANENSIS: NOT DETECTED
ENTEROAGGREGATIVE E COLI: NOT DETECTED
ENTEROPATHOGENIC E COLI: NOT DETECTED
GPP - ADENOVIRUS 40/41: NOT DETECTED
GPP - CRYPTOSPORIDIUM: NOT DETECTED
GPP - ENTAMOEBA HISTOLYTICA: NOT DETECTED
GPP - ENTEROTOXIGENIC E COLI (ETEC): NOT DETECTED
GPP - GIARDIA LAMBLIA: NOT DETECTED
GPP - NOROVIRUS GI/GII: NOT DETECTED
GPP - ROTAVIRUS A: NOT DETECTED
GPP - SALMONELLA: NOT DETECTED
GPP - VIBRIO CHOLERA: NOT DETECTED
GPP - YERSINIA ENTEROCOLITICA: NOT DETECTED
LACTATE PLASV-SCNC: NOT DETECTED MMOL/L
PLESIOMONAS SHIGELLOIDES: NOT DETECTED
SAPOVIRUS: NOT DETECTED
SHIGELLA SP+EIEC IPAH STL QL NAA+PROBE: NOT DETECTED
VIBRIO: NOT DETECTED

## 2024-09-03 PROCEDURE — 87507 IADNA-DNA/RNA PROBE TQ 12-25: CPT | Performed by: STUDENT IN AN ORGANIZED HEALTH CARE EDUCATION/TRAINING PROGRAM

## 2024-09-03 PROCEDURE — 83993 ASSAY FOR CALPROTECTIN FECAL: CPT | Performed by: STUDENT IN AN ORGANIZED HEALTH CARE EDUCATION/TRAINING PROGRAM

## 2024-09-04 ENCOUNTER — TELEPHONE (OUTPATIENT)
Dept: INTERNAL MEDICINE | Facility: CLINIC | Age: 69
End: 2024-09-04
Payer: MEDICARE

## 2024-09-04 ENCOUNTER — TELEPHONE (OUTPATIENT)
Dept: GASTROENTEROLOGY | Facility: CLINIC | Age: 69
End: 2024-09-04
Payer: MEDICARE

## 2024-09-04 NOTE — TELEPHONE ENCOUNTER
----- Message from Nymars Angeles sent at 9/3/2024  2:14 PM CDT -----  Regarding: Appt  Contact: [phone]  Type:  Sooner Apoointment Request    Caller is requesting a sooner appointment.  Caller declined first available appointment listed below.  Caller will not accept being placed on the waitlist and is requesting a message be sent to doctor.  Name of Caller:Patient   When is the first available appointment?Oct 31  Symptoms:lost of weight and not able to hold food down  Would the patient rather a call back or a response via [facility]? Call Back  Best Call Back Number:[phone]  Additional Information:

## 2024-09-04 NOTE — TELEPHONE ENCOUNTER
LVM that call was returned, He can call back at , Left message that unfortunately we have no available slots until October, but I see slots at [facility]

## 2024-09-04 NOTE — TELEPHONE ENCOUNTER
----- Message from Nymars Angeles sent at 9/3/2024  2:14 PM CDT -----  Regarding: Appt  Contact: [phone]  Type:  Sooner Apoointment Request    Caller is requesting a sooner appointment.  Caller declined first available appointment listed below.  Caller will not accept being placed on the waitlist and is requesting a message be sent to doctor.  Name of Caller:Patient   When is the first available appointment?Oct 31  Symptoms:lost of weight and not able to hold food down  Would the patient rather a call back or a response via [facility]? Call Back  Best Call Back Number:[phone]  Additional Information:

## 2024-09-04 NOTE — TELEPHONE ENCOUNTER
----- Message from Joceline Steve sent at 9/4/2024  3:45 PM CDT -----  Regarding: Lab  test cancellation  There was an issue with the Stool Calprotectin test ordered on this patient from 9/3/24.     Unfortunately, the lab received a sample that was insufficient in volume (Quantity Not Sufficient; QNS)  for testing so the order has been cancelled and will need to be reordered and recollected if clinically indicated.     If there are any questions, please call the Sendout lab at [phone] ext. [phone].  Anyone in the Sendout lab will be able to assist you.

## 2024-09-04 NOTE — TELEPHONE ENCOUNTER
----- Message from Nymars Angeles sent at 9/3/2024  2:14 PM CDT -----  Regarding: Appt  Contact: [phone]  Type:  Sooner Apoointment Request    Caller is requesting a sooner appointment.  Caller declined first available appointment listed below.  Caller will not accept being placed on the waitlist and is requesting a message be sent to doctor.  Name of Caller:Patient   When is the first available appointment?Oct 31  Symptoms:lost of weight and not able to hold food down  Would the patient rather a call back or a response via [facility]? Call Back  Best Call Back Number:[phone]  Additional Information:

## 2024-09-23 ENCOUNTER — PATIENT MESSAGE (OUTPATIENT)
Dept: RHEUMATOLOGY | Facility: CLINIC | Age: 69
End: 2024-09-23
Payer: MEDICARE

## 2024-09-27 NOTE — PROGRESS NOTES
Subjective:      Chief Complaint: No chief complaint on file.    HPI: Nithin Wagner is a 69 y.o. male with diabetes, dyslipidemia, hypertension, diastolic heart failure, PVD, RA, vitamin D deficiency, osteoporosis, obesity, and tobacco use who is here for a follow-up evaluation for diabetes. Last seen by me in Jan 2024    The patient location is: in [place]  The chief complaint leading to consultation is: diabetes    Visit type: audiovisual    Face to Face time with patient: 27 minutes   35 minutes of total time spent on the encounter, which includes face to face time and non-face to face time preparing to see the patient (eg, review of tests), Obtaining and/or reviewing separately obtained history, Documenting clinical information in the electronic or other health record, Independently interpreting results (not separately reported) and communicating results to the patient/family/caregiver, or Care coordination (not separately reported).    Each patient to whom he or she provides medical services by telemedicine is:  (1) informed of the relationship between the physician and patient and the respective role of any other health care provider with respect to management of the patient; and (2) notified that he or she may decline to receive medical services by telemedicine and may withdraw from such care at any time.    Patient reported in 2007, had heart issues (atrial myxoma), surgery, issues with anesthesia. Then had pain, f/u showed lung issues, then surgery 2008. Later RA, on mtx, other meds. Including steroids.    States he has lost around 70 pounds since Jan 2022 -diet and addition of Mounjaro. He is no longer on Mounjaro. 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 Having nausea and diarrhea Has been on TPN recently. Will be seeing GI soon for coloscopy and EGD    He is on his way to see his sister.  Also has covid at this time.      He had a fall in April 2024 -fractured  T11 and had kyphoplasty.   Has not had steroids consistently since around April 2024. He has been on steroids for the last 10 years.      With regards to the diabetes:  Diagnosed with T2DM since around 2428-7356. Had symptoms, sugar 300. Metformin initially, other pills, GLP1, then lately insulin.     Known complications:    Retinopathy- Denies;April 2024    Nephropathy- No    Neuropathy- No    CAD? Yes, hx stents. Also HFpEF    CVA- TIA with myxoma, denies CVA in the past     Today, using inpen   He has stopped using omnipod-cost prohibitive.    Medications  He is not on medication for diabetes   Was on MDI Tresiba 60 units daily and Novolog ISF 15;I:C 1:8     He was giving novolg 6-10 times daily; giving about 70 units of novolog daily  He is changing needle every time. He is rotating injection sites. He is giving novolog before a meal 45 minutes     Missed doses-N/A    Other medications tried:   onglyza-caused HF   metformin. Wasn't helping as much   other meds   Trulicity: 4.5 mg/week. Tuesdays. Stopped in the hospital   Basal insulin: Tresiba 44 units daily   Prandial insulin: Novolog 14 units TIDWM plus scale.    Mounjaro -- constipation     #4 times a day testing  Dexcom -blood sugars are at goal  See media tab    Hypoglycemic event- denies and denies s/s of hypoglycemia   Knows how to correct with 15 grams of carbs- juice, coke, or a peppermint.     Dietary recall: He is working on diet.  States he is eating 1200 calorie diet daily  Trying to do low carb diet.   Snacks: cheese and crackers or sardines   Drinks: caffeine in coffee spikes blood sugar and was drinking 3 pots in the past     Exercise - He is trying to incorporate exercise -walking and using rubber bands     Education - last visit: 6/2023    Has a Medic alert tag- has pendant for falls   Has been having  more pain when walking lately  Glucagon/Baqsimi- does not want    Diabetes Management Status  Statin: Not taking- did not tolerate  ACE/ARB:  Taking    Lab Results   Component Value Date    HGBA1C 7.4 (H) 03/01/2024    HGBA1C 7.0 (H) 12/01/2023    HGBA1C 7.1 (H) 09/29/2023     Screening or Prevention Patient's value Goal Complete/Controlled?   HgA1C Testing and Control   Lab Results   Component Value Date    HGBA1C 7.4 (H) 03/01/2024      Annually/Less than 8% Yes   Lipid profile : 08/19/2024 Annually Yes   LDL control Lab Results   Component Value Date    LDLCALC 79.4 08/19/2024    Annually/Less than 100 mg/dl  No   Nephropathy screening Lab Results   Component Value Date    LABMICR 23.0 03/01/2024     Lab Results   Component Value Date    PROTEINUA Negative 08/20/2024    Annually Yes   Blood pressure BP Readings from Last 1 Encounters:   09/01/24 123/80    Less than 140/90 Yes   Dilated retinal exam : 01/30/2024 Annually Yes   Foot exam   Most Recent Foot Exam Date: Not Found Annually No     He is seeing hepatology      Latest Reference Range & Units 09/21/23 11:54 09/29/23 15:01   AST 10 - 40 U/L 25 39   ALT 10 - 44 U/L 51 (H) 89 (H)   (H): Data is abnormally high    With regards to osteoporosis,     Based on FRAX score     He is on ergo 50k weekly and MVI with 1000 IU daily  He has calcium in diet; eating more cheese and dairy     current medication: none and did not want treatment    He had a fall in April 2024 -fractured T11, L1, L3 and had kyphoplasty.    Weight bearing exercise- Walking and also using rubber bands for strength training   Recent falls- stumbles and grabs himself once every 2 to 3 days; actual fall in Feb/March 2023    They have made fall precautions in house   Dental work- denies     No recent fractures   No significant height loss (>2 inches)    tob use -  he has stopped smoking since hospitalization   etoh use-denies      No current diarrhea or h/o malabsorption  + chronic exposure to steroid therapy,  - anticoagulants, proton pump inhibitors or antiseizure medications.     + GERD, indigestion,   Denies gastric bypass surgery.      Denies history of thyroid disease, anemia, kidney stones or kidney disease.     Denies active malignancy, history of malignancy the involved the bone, prior radiation treatment, or unexplained elevations of alk phos on labs     Bone density    7/5/23 DXA  FINDINGS:  The bone mineral density measured from L1 through L4 is 1.307g/cm2.  This corresponds to a T score of 0.6 and a Z score of 0.5.  Decrease in bone mineral density by 4%.     The bone mineral density within the left femoral neck measures 0.816 g/cm2.  This corresponds to a T score of -2.0 and a Z score of -1.1.     The bone mineral density within the right femoral neck measures 0.855 g/cm2.  This corresponds to a T score of -1.7 and a Z score of -0.8.  Decrease in bone mineral density by 10%.     FRAX RESULTS:     10-year Probability of Fracture:   Major Osteoporotic Fracture 10.3%.   Hip Fracture 4.1%.   Impression:     Osteopenia both hips with decrease in bone mineral density by 10%.   Latest Reference Range & Units 09/29/23 15:01   Vit D, 25-Hydroxy 30 - 96 ng/mL 30     With regards to dyslipidemia,   Unable to tolerate statin- allergy  States he has tried several in the past and caused heart arrhythmia   Does not want to try praulent or repatha      Latest Reference Range & Units 12/01/23 08:05   Cholesterol Total 120 - 199 mg/dL 221 (H)   HDL 40 - 75 mg/dL 61   HDL/Cholesterol Ratio 20.0 - 50.0 % 27.6   Non-HDL Cholesterol mg/dL 160   Total Cholesterol/HDL Ratio 2.0 - 5.0  3.6   Triglycerides 30 - 150 mg/dL 157 (H)   LDL Cholesterol 63.0 - 159.0 mg/dL 128.6   (H): Data is abnormally high    With regards to elevated prolactin-now normal,    Now normal    Currently pt reports no galactorrhea  + diplopia that recently started around 2 months ago  - chronic headaches    No unexplained weight changes.  + fatigue  He does report some sleep problems. Has pulse ox- decreasing O2. States he has CPAP but unable to tolerate mask. States he has scarring on  trachea from intubation in the past    Denies cold intolerance   No nausea or vomiting  + orthostatic symptoms     The patient is not currently using any medication known to cause hyperprolactinemia such as: antipsychotics (risperidone, phenothiazines, haloperidol and butyrophenones),gastric motility drugs (metoclopramide and domperidone), or antihypertensives (methyldopa, reserpine, and verapamil).     Latest Reference Range & Units 06/21/22 07:23   Prolactin 3.5 - 19.4 ng/mL 28.8 (H)      Latest Reference Range & Units 06/21/22 07:23   FSH 0.95 - 11.95 mIU/mL 5.93   LH 0.6 - 12.1 mIU/mL 2.4   Prolactin 3.5 - 19.4 ng/mL 28.8 (H)   Sex Hormone Binding Globulin 22 - 77 nmol/L 79 (H)   Testosterone 250 - 1100 ng/dL 542   Testosterone, Bioavailable 110.0 - 575.0 ng/dL 63.7 (L)   Testosterone, Free 46.0 - 224.0 pg/mL 33.8 (L)      Latest Reference Range & Units 06/21/22 07:23 07/19/23 09:17   Prolactin 3.5 - 19.4 ng/mL 28.8 (H) 19.0   (H): Data is abnormally high    FIB-4 Calculation: 2.64 at 5/21/2024  3:03 PM     FIB-4 below 1.30 is considered as low-risk for advanced fibrosis  FIB-4 over 2.67 is considered as high-risk for advanced fibrosis  FIB-4 values between 1.30 and 2.67 are considered as intermediate-risk of advanced fibrosis for ages 36-64.     For ages > 64 the cut-off for low-risk goes to < 2.  This is a screening tool and clinical judgement should be used in the interpretation of these results.    Reviewed past medical, family, social history and updated as appropriate.     Review of Systems   Constitutional:  Positive for fatigue. Negative for unexpected weight change.   Eyes:  Negative for visual disturbance.   Endocrine: Positive for polyphagia. Negative for polydipsia and polyuria.        Hair loss   Musculoskeletal:  Positive for arthralgias, back pain and gait problem.     As above    Objective:     There were no vitals filed for this visit.    Prior vitals:  BP Readings from Last 5 Encounters:    09/01/24 123/80   08/20/24 115/73   08/08/24 (!) 140/86   07/29/24 124/75   07/25/24 101/71     Physical Exam  Neurological:      Mental Status: He is alert.       Wt Readings from Last 10 Encounters:   09/01/24 0145 70.3 kg (155 lb)   08/26/24 1344 70.3 kg (155 lb)   08/19/24 2256 83.5 kg (184 lb)   08/07/24 2157 83.5 kg (184 lb)   07/14/24 0430 83.6 kg (184 lb 4.9 oz)   07/07/24 0430 83.1 kg (183 lb 3.2 oz)   07/06/24 0430 83.3 kg (183 lb 10.3 oz)   07/05/24 0430 83.5 kg (184 lb 1.4 oz)   07/04/24 1201 81.5 kg (179 lb 10.8 oz)   07/02/24 1405 81.5 kg (179 lb 10.8 oz)   07/01/24 0430 81.5 kg (179 lb 10.8 oz)   06/28/24 2045 80.6 kg (177 lb 11.1 oz)   06/28/24 1512 97.5 kg (215 lb)   06/26/24 2221 99.8 kg (220 lb)   06/06/24 0134 99.8 kg (220 lb)   06/01/24 2351 99.8 kg (220 lb)   05/20/24 1129 92 kg (202 lb 13.2 oz)   04/30/24 0434 92.6 kg (204 lb 2.3 oz)   04/26/24 2241 91.3 kg (201 lb 4.5 oz)   04/26/24 1525 91.3 kg (201 lb 4.5 oz)     Lab Results   Component Value Date    HGBA1C 7.4 (H) 03/01/2024     Lab Results   Component Value Date    CHOL 141 08/19/2024    HDL 24 (L) 08/19/2024    LDLCALC 79.4 08/19/2024    TRIG 188 (H) 08/19/2024    CHOLHDL 17.0 (L) 08/19/2024     Lab Results   Component Value Date     (L) 08/19/2024    K 4.1 08/19/2024     08/19/2024    CO2 24 08/19/2024     (H) 08/19/2024    BUN 7 (L) 08/19/2024    CREATININE 0.6 08/19/2024    CALCIUM 7.6 (L) 08/19/2024    PROT 5.9 (L) 08/19/2024    ALBUMIN 1.5 (L) 08/19/2024    BILITOT 1.0 08/19/2024    ALKPHOS 450 (H) 08/19/2024    AST 45 (H) 08/19/2024    ALT 25 08/19/2024    ANIONGAP 8 08/19/2024    ESTGFRAFRICA >60 07/21/2022    EGFRNONAA >60 07/21/2022    TSH 3.731 08/19/2024      Lab Results   Component Value Date    MICALBCREAT 19.0 03/01/2024     Assessment/Plan:     1. Type 2 diabetes mellitus with other circulatory complication, with long-term current use of insulin  Cortisol, 8AM    Hemoglobin A1C    TSH    Comprehensive  Metabolic Panel    ACTH    Fructosamine    Ambulatory referral/consult to Diabetes Education      2. Vitamin D deficiency        3. Osteoporosis, unspecified osteoporosis type, unspecified pathological fracture presence        4. Class 1 obesity due to excess calories with serious comorbidity and body mass index (BMI) of 34.0 to 34.9 in adult        5. Hyperlipidemia, unspecified hyperlipidemia type        6. Essential hypertension        7. Severe protein-calorie malnutrition        8. Unintentional weight loss          Type 2 diabetes mellitus, with long-term current use of insulin  Reviewed goals of therapy - to get the best control we can without hypoglycemia. Goal <7.5   - last a1c at goal    Labs this week  Consult diabetes education   Call [phone] if unable to get labs outside  Dexcom reviewed: blood sugars are at goal   Check A1c   No medications for diabetes  Check ACTH and cortisol considering his weakness, orthostasic hypotension, nausea, and diarrhea.   May check stim test in future. Last cortisol checked in middle of the day, will check before 9AM     - reviewed dose adjustments as well.   -Advised frequent self blood glucose monitoring. Patient encouraged to document glucose results and bring them to every clinic visit    -Hypoglycemia precautions discussed. Instructed on precautions before driving.    -Close adherence to lifestyle changes recommended.    -Periodic follow ups for eye evaluations, foot care suggested.  Not on statin due to allergy - consider PCSK9 in future    Vitamin D deficiency  Continue Vitamin D        Osteoporosis  Based on FRAX score and now based on fracture after fall    Avoid falls. He stopped smoking. Encouraged exercise per PT. Continue vitamin D and calcium in diet. Has significant reflux - would avoid PO bisphosphonate's     Reclast -having significant RA pains at this time Will avoid for now. Prolia -he would like to avoid due to increased risk of infection due to  steroids  Having hypocalcemia and weight loss. Will hold off on treatment until evaluation complete for AI    Class 1 obesity due to excess calories with serious comorbidity and body mass index (BMI) of 34.0 to 34.9 in adult  Losing weight     Hyperlipidemia  Allergy to statin  Not interested in trying PCSK9    Essential hypertension  bp controlled  continue regimen  F/u with PCP, monitor      Severe protein-calorie malnutrition  Losing weight and having nausea.       Unintentional weight loss  Rule out endo cause   Check ACTH and cortisol     Visit today included increased complexity associated with the care of episodic problems type 2 diabetes, hyperlipidemia, and osteoporosis addressed and managing longitudinal care of the patient due to serious managed problems type 2 diabetes, hyperlipidemia, and osteoporosis     Agrees to look at AVS    Follow up in about 4 months (around 1/30/2025).  Labs in June 2024

## 2024-09-30 ENCOUNTER — OFFICE VISIT (OUTPATIENT)
Dept: ENDOCRINOLOGY | Facility: CLINIC | Age: 69
End: 2024-09-30
Payer: MEDICARE

## 2024-09-30 DIAGNOSIS — E78.5 HYPERLIPIDEMIA, UNSPECIFIED HYPERLIPIDEMIA TYPE: ICD-10-CM

## 2024-09-30 DIAGNOSIS — E11.59 TYPE 2 DIABETES MELLITUS WITH OTHER CIRCULATORY COMPLICATION, WITH LONG-TERM CURRENT USE OF INSULIN: Primary | ICD-10-CM

## 2024-09-30 DIAGNOSIS — E66.09 CLASS 1 OBESITY DUE TO EXCESS CALORIES WITH SERIOUS COMORBIDITY AND BODY MASS INDEX (BMI) OF 34.0 TO 34.9 IN ADULT: ICD-10-CM

## 2024-09-30 DIAGNOSIS — R63.4 UNINTENTIONAL WEIGHT LOSS: ICD-10-CM

## 2024-09-30 DIAGNOSIS — I10 ESSENTIAL HYPERTENSION: ICD-10-CM

## 2024-09-30 DIAGNOSIS — E66.811 CLASS 1 OBESITY DUE TO EXCESS CALORIES WITH SERIOUS COMORBIDITY AND BODY MASS INDEX (BMI) OF 34.0 TO 34.9 IN ADULT: ICD-10-CM

## 2024-09-30 DIAGNOSIS — M81.0 OSTEOPOROSIS, UNSPECIFIED OSTEOPOROSIS TYPE, UNSPECIFIED PATHOLOGICAL FRACTURE PRESENCE: ICD-10-CM

## 2024-09-30 DIAGNOSIS — E55.9 VITAMIN D DEFICIENCY: ICD-10-CM

## 2024-09-30 DIAGNOSIS — Z79.4 TYPE 2 DIABETES MELLITUS WITH OTHER CIRCULATORY COMPLICATION, WITH LONG-TERM CURRENT USE OF INSULIN: Primary | ICD-10-CM

## 2024-09-30 DIAGNOSIS — E43 SEVERE PROTEIN-CALORIE MALNUTRITION: ICD-10-CM

## 2024-09-30 NOTE — ASSESSMENT & PLAN NOTE
Reviewed goals of therapy - to get the best control we can without hypoglycemia. Goal <7.5   - last a1c at goal    Labs this week  Consult diabetes education   Call [phone] if unable to get labs outside  Dexcom reviewed: blood sugars are at goal   Check A1c   No medications for diabetes  Check ACTH and cortisol considering his weakness, orthostasic hypotension, nausea, and diarrhea.   May check stim test in future. Last cortisol checked in middle of the day, will check before 9AM     - reviewed dose adjustments as well.   -Advised frequent self blood glucose monitoring. Patient encouraged to document glucose results and bring them to every clinic visit    -Hypoglycemia precautions discussed. Instructed on precautions before driving.    -Close adherence to lifestyle changes recommended.    -Periodic follow ups for eye evaluations, foot care suggested.  Not on statin due to allergy - consider PCSK9 in future

## 2024-09-30 NOTE — PATIENT INSTRUCTIONS
Labs this week  Consult diabetes education   Call [phone] if unable to get labs outside  [facility]   Check A1c   No medications for diabetes  Check ACTH and cortisol considering his weakness, orthostasic hypotension, nausea, and diarrhea.   May check stim test in future. Last cortisol checked in middle of the day, will check before 9AM    Difference between continuous glucose monitor and fingerstick.  The fingerstick comes from the blood. The continuous glucose monitor lives in a space between blood and tissue. The blood glucose will always rise and fall faster than the continuous glucose monitor but eventually the continuous glucose monitor is expected to catch up to blood glucose. If you have symptoms of hyperglycemia or hypoglycemia than it is best to check a fingerstick. If you correct a hypoglycemic event, it is best to check a fingerstick 15 minutes after treating as the continuous glucose monitor may take a while to show improvement in blood sugar. An analogy is a train going up a hill- the blood is the conductor and the continuous  glucose monitor is the caboose.    Hypoglycemia - Low Blood Sugar    What can you do?  Rule of 15:   Test your blood sugar  If glucose is between 50-70 mg/dL then ingest 15 grams of fast-acting carbs  If glucose is less than 50 mg/dL then ingest 30 grams of fast-acting carbs  Ingest 15 grams of fast-acting carbohydrate - such as:   3-4 glucose tablets  4 oz juice  ½ can regular soda pop  15 skittles or mini jelly beans   Re-check your blood sugar in 15 minutes. If its less than 70mg/dl, repeat steps 2 and 3.  If your next meal is more than 1 hour away, eat an additional 15 grams of carbohydrate and 1 ounce of protein (examples include crackers with cheese or one-half of a sandwich with peanut butter). It is important not to eat too much because this can raise your blood sugar above the target level.      After your blood sugar has normalized, think  about why you went low. If you notice a pattern of low blood sugars, contact your Diabetes Team. We may need to adjust your medication.

## 2024-09-30 NOTE — ASSESSMENT & PLAN NOTE
Based on FRAX score and now based on fracture after fall    Avoid falls. He stopped smoking. Encouraged exercise per PT. Continue vitamin D and calcium in diet. Has significant reflux - would avoid PO bisphosphonate's     Reclast -having significant RA pains at this time Will avoid for now. Prolia -he would like to avoid due to increased risk of infection due to steroids  Having hypocalcemia and weight loss. Will hold off on treatment until evaluation complete for AI

## 2024-10-02 ENCOUNTER — OFFICE VISIT (OUTPATIENT)
Dept: HEPATOLOGY | Facility: CLINIC | Age: 69
End: 2024-10-02
Payer: MEDICARE

## 2024-10-02 ENCOUNTER — TELEPHONE (OUTPATIENT)
Dept: PHYSICAL MEDICINE AND REHAB | Facility: CLINIC | Age: 69
End: 2024-10-02
Payer: MEDICARE

## 2024-10-02 DIAGNOSIS — R63.4 UNINTENTIONAL WEIGHT LOSS: ICD-10-CM

## 2024-10-02 DIAGNOSIS — E43 SEVERE PROTEIN-CALORIE MALNUTRITION: ICD-10-CM

## 2024-10-02 DIAGNOSIS — E11.59 TYPE 2 DIABETES MELLITUS WITH OTHER CIRCULATORY COMPLICATION, WITH LONG-TERM CURRENT USE OF INSULIN: ICD-10-CM

## 2024-10-02 DIAGNOSIS — K76.0 NAFLD (NONALCOHOLIC FATTY LIVER DISEASE): Primary | ICD-10-CM

## 2024-10-02 DIAGNOSIS — Z79.4 TYPE 2 DIABETES MELLITUS WITH OTHER CIRCULATORY COMPLICATION, WITH LONG-TERM CURRENT USE OF INSULIN: ICD-10-CM

## 2024-10-02 PROCEDURE — 99215 OFFICE O/P EST HI 40 MIN: CPT | Mod: 95,,, | Performed by: INTERNAL MEDICINE

## 2024-10-02 PROCEDURE — 3061F NEG MICROALBUMINURIA REV: CPT | Mod: CPTII,95,, | Performed by: INTERNAL MEDICINE

## 2024-10-02 PROCEDURE — 3051F HG A1C>EQUAL 7.0%<8.0%: CPT | Mod: CPTII,95,, | Performed by: INTERNAL MEDICINE

## 2024-10-02 PROCEDURE — 4010F ACE/ARB THERAPY RXD/TAKEN: CPT | Mod: CPTII,95,, | Performed by: INTERNAL MEDICINE

## 2024-10-02 PROCEDURE — 3066F NEPHROPATHY DOC TX: CPT | Mod: CPTII,95,, | Performed by: INTERNAL MEDICINE

## 2024-10-02 NOTE — PROGRESS NOTES
Subjective:       Patient ID: Nithin Wagner is a 69 y.o. male.    Chief Complaint: No chief complaint on file.  The patient location is: Home  The chief complaint leading to consultation is: Abnormal LFTs    Visit type: audiovisual    Face to Face time with patient: 20 minutes of total time spent on the encounter, which includes face to face time and non-face to face time preparing to see the patient (eg, review of tests), Obtaining and/or reviewing separately obtained history, Documenting clinical information in the electronic or other health record, Independently interpreting results (not separately reported) and communicating results to the patient/family/caregiver, or Care coordination (not separately reported).       Each patient to whom he or she provides medical services by telemedicine is:  (1) informed of the relationship between the physician and patient and the respective role of any other health care provider with respect to management of the patient; and (2) notified that he or she may decline to receive medical services by telemedicine and may withdraw from such care at any time.    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  I last saw him in Dec 2021 and at that time, he was found to have liver steatosis but with no fibrosis.    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  He has lost a tremendous amount of weight (approx 100lb) and at one point was on TPN.      - had a fall and fractured T11 in April 2024- kyphoplasty  - not on steroids anumore  - lost 70 lb since 2022- prev on Terzapetide    - Nausea but no vomiting  - Now constipated- on hydrocodone    He is currently extremely frail and can only take 3-4 steps.  During his illness he developed very cholestatic LFTs that appear to be improving.      - previously on Orencia and prednisone but was on methotrexate for  about 1.5 years until 2017.    He describes jaundice twice- first time in 1974 after a needle stick accident at work and the second time when he was in [place].    I note that he is currently HCV ab negative but is HBVcAb+ve.    He had been overweight with a BMI of 34 and has Diabetes.    He was recently started on lamivudine and was switched from enbrel to orencia.    Enbrel caused congestive heart failure- stent 2020- LVEF 47%  Started Orencia- Dec 2021    Abdo US: 5/5/20  Hepatic steatosis with focal fatty sparing near the gallbladder fossa.  No hepatic mass lesions    Fibroscan: 5/5/20  F0-1  S3    PMH:  Type 2 DM  Hypertension  Gout  Hyperlipidemia  Left atrial myxoma- 2007  Right upper lobectomy- 2009    SH:  No alcohol  35 pack years of cigarette smoking    FH:  Niece has HCV    Review of Systems   Constitutional:  Positive for activity change, appetite change, fatigue and unexpected weight change. Negative for chills and fever.   HENT:  Negative for hearing loss.    Eyes:  Negative for discharge and visual disturbance.   Respiratory:  Negative for cough, chest tightness, shortness of breath and wheezing.    Cardiovascular:  Negative for chest pain, palpitations and leg swelling.   Gastrointestinal:  Negative for abdominal distention, abdominal pain, constipation, diarrhea and nausea.   Genitourinary:  Negative for dysuria and frequency.   Musculoskeletal:  Negative for arthralgias and back pain.   Skin:  Negative for pallor and rash.   Neurological:  Negative for dizziness, tremors, speech difficulty and headaches.   Hematological:  Negative for adenopathy.   Psychiatric/Behavioral:  Negative for agitation and confusion.          Lab Results   Component Value Date    ALT 25 08/19/2024    AST 45 (H) 08/19/2024     (H) 06/30/2024    ALKPHOS 450 (H) 08/19/2024    BILITOT 1.0 08/19/2024     Past Medical History:   Diagnosis Date    Arthritis     Atrial myxoma     Coronary atherosclerosis 2020    stents x 3     Diabetes mellitus, type 2     Difficult intubation     Heart failure     Hepatitis B     HPV (human papilloma virus) infection     Hyperlipidemia     Hypertension     Non-alcoholic fatty liver disease     Rheumatoid arthritis     Rheumatoid arthritis flare 07/12/2021    Squamous cell carcinoma of forehead 10/26/2023    forehead    Stroke     TIA     Past Surgical History:   Procedure Laterality Date    CATARACT EXTRACTION W/  INTRAOCULAR LENS IMPLANT Right 3/28/2024    Procedure: EXTRACTION, CATARACT, WITH IOL INSERTION;  Surgeon: Hans Woodward MD;  Location: [facility] OR;  Service: Ophthalmology;  Laterality: Right;    CORONARY ANGIOGRAPHY N/A 3/10/2021    Procedure: ANGIOGRAM, CORONARY ARTERY - right radial;  Surgeon: Shemar Dempsey MD;  Location: [facility] CATH LAB;  Service: Cardiology;  Laterality: N/A;    CORONARY STENT PLACEMENT  03/10/2021    prox-mid RCA Marshal 4.5 x 26 mm, 4.5 x 12 mm    ESOPHAGOGASTRODUODENOSCOPY N/A 7/8/2024    Procedure: EGD (ESOPHAGOGASTRODUODENOSCOPY);  Surgeon: Janet Win MD;  Location: [facility] ENDO;  Service: Endoscopy;  Laterality: N/A;    FIXATION KYPHOPLASTY N/A 4/22/2024    Procedure: KYPHOPLASTY;  Surgeon: Stefan Stern MD;  Location: [facility];  Service: Pain Management;  Laterality: N/A;  NO PROPOFOL    INCISION AND DRAINAGE OF SCROTUM N/A 11/15/2022    Procedure: INCISION AND DRAINAGE, SCROTUM;  Surgeon: William Hatch MD;  Location: [facility];  Service: Urology;  Laterality: N/A;    INCISION AND DRAINAGE OF SCROTUM N/A 11/17/2022    Procedure: INCISION AND DRAINAGE, SCROTUM;  Surgeon: William Hatch MD;  Location: [facility];  Service: Urology;  Laterality: N/A;    LUNG LOBECTOMY Right 2008    RUL lobectomy after removal of atrial myxoma    MAGNETIC RESONANCE IMAGING N/A 4/30/2024    Procedure: MRI (Magnetic Resonance Imagine);  Surgeon: Peace Watts;  Location: [facility];  Service: Anesthesiology;  Laterality: N/A;    PERITONEOCENTESIS N/A 7/2/2024    Procedure: PARACENTESIS,  "ABDOMINAL;  Surgeon: Ventura Jordan MD;  Location: [facility] CATH LAB;  Service: Radiology;  Laterality: N/A;    PLEURA BIOPSY      RESECTION OF ATRIAL MYXOMA  2007     Current Outpatient Medications   Medication Sig    BD ULTRA-FINE RAHUL PEN NEEDLE 32 gauge x 5/32" Ndle To use 6 daily    diclofenac sodium (SOLARAZE) 3 % gel Apply to affected area three times a day.    diclofenac sodium (SOLARAZE) 3 % gel Apply topically to the affected area 3 (three) times daily. (Patient not taking: Reported on 8/26/2024)    digoxin (LANOXIN) 125 mcg tablet TAKE 1 TABLET BY MOUTH EVERY DAY    ergocalciferol (ERGOCALCIFEROL) 50,000 unit Cap Take 1 capsule (50,000 Units total) by mouth every 7 days.    EScitalopram oxalate (LEXAPRO) 20 MG tablet Take 20 mg by mouth every evening.    EScitalopram oxalate (LEXAPRO) 20 MG tablet TAKE 1 TABLET BY MOUTH EVERY DAY    folic acid (FOLVITE) 1 MG tablet Take 1 tablet (1 mg total) by mouth once daily.    gabapentin (NEURONTIN) 400 MG capsule Take 1 capsule (400 mg total) by mouth 3 (three) times daily.    HYDROcodone-acetaminophen (NORCO)  mg per tablet Take 1 tab by mouth every 6 hours as needed for severe pain    HYDROcodone-acetaminophen (NORCO)  mg per tablet Take 1 tablet by mouth every 6 (six) hours as needed for severe pain.    HYDROcodone-acetaminophen (NORCO)  mg per tablet Take 1 tablet by mouth every 6 (six) hours as needed for severe pain.  Greater than 7 days medically necessary    HYDROcodone-acetaminophen (NORCO) 5-325 mg per tablet TAKE 1 TABLET BY MOUTH EVERY 6 HOURS AS NEEDED FOR PAIN.  GREATER THAN 7 DAYS MEDICALLY NECESSARY.  CONTINUING TO WEAN OFF ON THIS RX. (Patient not taking: Reported on 8/26/2024)    HYDROcodone-acetaminophen (NORCO) 7.5-325 mg per tablet Take 1 tablet by mouth every 6 (six) hours as needed for Pain. (Patient not taking: Reported on 8/26/2024)    HYDROcodone-acetaminophen 7.5-300 mg Tab 1 TAB PO Q 6 H PRN PAIN, GREATER THAN 7 DAYS " MEDICALLY NECESSARY (Patient not taking: Reported on 8/26/2024)    insulin degludec (TRESIBA FLEXTOUCH U-200) 200 unit/mL (3 mL) insulin pen Inject 4 units under the skin daily (Patient not taking: Reported on 8/26/2024)    insulin pen,reusable,BT,aspart (INPEN, NOVOLOG OR FIASP, PINK) InPn To use with inpen cartridges (Patient not taking: Reported on 8/26/2024)    insulin pen,reusable,BT,lispro (INPEN, FOR HUMALOG, PINK) InPn use as directed with inpen cartridge (Patient not taking: Reported on 8/26/2024)    ketoconazole (NIZORAL) 2 % cream Apply to affected area DAILY (Patient not taking: Reported on 8/26/2024)    Lactobacillus rhamnosus GG (CULTURELLE) 10 billion cell capsule Take 1 capsule by mouth once daily. (Patient not taking: Reported on 8/26/2024)    lamiVUDine (EPIVIR) 150 MG Tab TAKE 1 TABLET BY MOUTH EVERY DAY    LIDOcaine (LIDODERM) 5 % Place 1 patch onto the skin once daily. Remove & Discard patch within 12 hours or as directed by MD    multivitamin (THERAGRAN) per tablet Take 1 tablet by mouth once daily.    mupirocin (BACTROBAN) 2 % ointment APPLY TO LEFT ARM ULCER DAILY (Patient not taking: Reported on 8/26/2024)    naloxone (NARCAN) 4 mg/actuation Spry USE AS DIRECTED INTRANASAL FOR SUSPECTED DRUG OVERDOSE    nitroGLYCERIN (NITROSTAT) 0.4 MG SL tablet PLACE 1 TABLET UNDER THE TONGUE EVERY 5 MINUTES AS NEEDED FOR CHEST PAIN.    nystatin (MYCOSTATIN) powder Apply topically 4 (four) times daily. Apply to intertriginous areas as directed up to four times daily to reduce moisture. for 14 days    ondansetron (ZOFRAN) 4 MG tablet Take 1 tablet (4 mg total) by mouth every 8 (eight) hours as needed for Nausea.    polyethylene glycol (GLYCOLAX) 17 gram/dose powder Use cap to measure 17 grams, mix in liquid and take by mouth once daily.    predniSONE (DELTASONE) 20 MG tablet Take 1 tablet by mouth daily    PROAIR HFA 90 mcg/actuation inhaler     tiZANidine (ZANAFLEX) 4 MG tablet TAKE 1 TABLET BY MOUTH EVERY  8 HOURS AS NEEDED FOR PAIN     No current facility-administered medications for this visit.       Objective:    NOT DONE- VIDEO VISIT      Assessment:       1. NAFLD (nonalcoholic fatty liver disease)    2. Unintentional weight loss    3. Type 2 diabetes mellitus with other circulatory complication, with long-term current use of insulin    4. Severe protein-calorie malnutrition          Plan:   Unfortunately he has deteriorated significantly over the last 5 months- he is now very frail and has lost of lot of weight in association with his Mary's gangrene and GI symptoms.    I do not think he has significant intrinsic liver disease based on his LFTs and abdo imaging. His cholestatic LFTs are likely due to his sepsis and possibly his TPN and are improving. I do not think he has cirrhosis.    - he has been HBVcAb +ve so we should excluded an HBV flare with an HBV DNA  - I also think he should have a repeat CMP  - his weight loss and GI symptoms are concerning and I wonder whether he has significant GI pathology such as a GI lymphoma.    1) if he does not have an HBV flare, he does not need HBV prophylaxis since he is no longer on biologics  2) He needs to undergo some further GI investigations when he moves to [place] in the next few days- mooving there to be with sister.  3) I suggest an abdo US.    Clinic in 3 months.

## 2024-10-02 NOTE — TELEPHONE ENCOUNTER
----- Message from Trinity sent at 10/1/2024  4:49 PM CDT -----  Regarding: Change to virtual?  Contact: [phone]  Hi, pt called to request a call to discuss changing the appt to a virtual? Pls call the pt at  [phone]  to discuss.    Thank you.

## 2024-10-03 ENCOUNTER — TELEPHONE (OUTPATIENT)
Dept: HEPATOLOGY | Facility: CLINIC | Age: 69
End: 2024-10-03
Payer: MEDICARE

## 2024-12-05 ENCOUNTER — PATIENT MESSAGE (OUTPATIENT)
Dept: DIABETES | Facility: CLINIC | Age: 69
End: 2024-12-05
Payer: MEDICARE

## 2024-12-17 ENCOUNTER — PATIENT MESSAGE (OUTPATIENT)
Dept: PAIN MEDICINE | Facility: CLINIC | Age: 69
End: 2024-12-17
Payer: MEDICARE

## 2025-02-06 NOTE — PROGRESS NOTES
Subjective:      Chief Complaint: No chief complaint on file.    HPI: Nithin Wagner is a 69 y.o. male with diabetes, dyslipidemia, hypertension, diastolic heart failure, PVD, RA, vitamin D deficiency, osteoporosis, obesity, and tobacco use who is here for a follow-up evaluation for diabetes. Last seen by me in Sept 2024    The patient location is: in [place]  The chief complaint leading to consultation is: diabetes    Visit type: audiovisual    Face to Face time with patient:30 minutes   40 minutes of total time spent on the encounter, which includes face to face time and non-face to face time preparing to see the patient (eg, review of tests), Obtaining and/or reviewing separately obtained history, Documenting clinical information in the electronic or other health record, Independently interpreting results (not separately reported) and communicating results to the patient/family/caregiver, or Care coordination (not separately reported).    Each patient to whom he or she provides medical services by telemedicine is:  (1) informed of the relationship between the physician and patient and the respective role of any other health care provider with respect to management of the patient; and (2) notified that he or she may decline to receive medical services by telemedicine and may withdraw from such care at any time.    Patient reported in 2007, had heart issues (atrial myxoma), surgery, issues with anesthesia. Then had pain, f/u showed lung issues, then surgery 2008. Later RA, on mtx, other meds. Including steroids.    States he has lost around 70 pounds since Jan 2022 -diet and addition of Mounjaro.  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    Since his last visit he has been travelling back and forth to [place] taking care of sister with Alzheimer's.     Since his last visit he has been hospitalized in [place] -was told he had pancreatic enzyme problem and recently started on  medication.     He is back on prednisone 15 mg daily at night.     With regards to the diabetes:  Diagnosed with T2DM since around 6635-9582. Had symptoms, sugar 300. Metformin initially, other pills, GLP1, then lately insulin.     Known complications:    Retinopathy- Denies    Nephropathy- No    Neuropathy- No    CAD? Yes, hx stents. Also HFpEF    CVA- TIA with myxoma, denies CVA in the past     Today, using inpen   He has stopped using omnipod-cost prohibitive.    Medications   Tresiba 20 units daily   Novolog ISF 15; I:C 1:8   6-12 units before meals     He would like to get back on OMNIPOD     He is changing needle every time. He is rotating injection sites. He is giving novolog before a meal 45 minutes     Missed doses-recently restarted     Other medications tried:   onglyza-caused HF   metformin. Wasn't helping as much   other meds   Trulicity: 4.5 mg/week. Tuesdays. Stopped in the hospital   Basal insulin: Tresiba 44 units daily   Prandial insulin: Novolog 14 units TIDWM plus scale.    Mounjaro -- constipation     #4 times a day testing  G7 Dexcom  Dexcom -blood sugars are above goal     Hypoglycemic event- denies and denies s/s of hypoglycemia   Knows how to correct with 15 grams of carbs- juice, coke, or a peppermint.     Dietary recall: He is working on diet.  States he is eating 1200 calorie diet daily  Trying to do low carb diet.   Snacks: cheese and crackers or sardines   Drinks: caffeine in coffee spikes blood sugar and was drinking 3 pots in the past     Exercise - He is trying to incorporate exercise -walking and using rubber bands     Education - last visit: 6/2023    Has a Medic alert tag- has pendant for falls   Has been having  more pain when walking lately  Glucagon/Baqsimi- does not want    Diabetes Management Status  Statin: Not taking- did not tolerate  ACE/ARB: Taking    Lab Results   Component Value Date    HGBA1C 8.5 (H) 01/13/2025    HGBA1C 5.4 10/04/2024    HGBA1C 7.4 (H) 03/01/2024      Screening or Prevention Patient's value Goal Complete/Controlled?   HgA1C Testing and Control   Lab Results   Component Value Date    HGBA1C 8.5 (H) 01/13/2025      Annually/Less than 8% Yes   Lipid profile : 01/13/2025 Annually Yes   LDL control Lab Results   Component Value Date    LDLCALC 79.4 08/19/2024    Annually/Less than 100 mg/dl  No   Nephropathy screening Lab Results   Component Value Date    LABMICR 23.0 03/01/2024     Lab Results   Component Value Date    PROTEINUA Negative 08/20/2024    Annually Yes   Blood pressure BP Readings from Last 1 Encounters:   09/01/24 123/80    Less than 140/90 Yes   Dilated retinal exam : 01/30/2024 Annually Yes   Foot exam   Most Recent Foot Exam Date: Not Found Annually No     He is seeing hepatology      Latest Reference Range & Units 09/21/23 11:54 09/29/23 15:01   AST 10 - 40 U/L 25 39   ALT 10 - 44 U/L 51 (H) 89 (H)   (H): Data is abnormally high    With regards to osteoporosis,     Based on FRAX score     He is on ergo 50k weekly   He has calcium in diet; eating more cheese and dairy     current medication: none and did not want treatment  Today, reports he wants treatment     He had a fall in April 2024 -fractured T11, L1, L3 and had kyphoplasty.    Weight bearing exercise- Walking and also using rubber bands for strength training   Recent falls- stumbles and grabs himself once every 2 to 3 days; actual fall in Feb/March 2023    They have made fall precautions in house   Dental work- needs cleaning; no need for dental work     No recent fractures   No significant height loss (>2 inches)    tob use - he has stopped smoking since hospitalization   etoh use-denies      No current diarrhea or h/o malabsorption  + chronic exposure to steroid therapy,  - anticoagulants, proton pump inhibitors or antiseizure medications.     + GERD, indigestion,   Denies gastric bypass surgery.     Denies history of thyroid disease, anemia, kidney stones or kidney disease.     Denies  active malignancy, history of malignancy the involved the bone, prior radiation treatment, or unexplained elevations of alk phos on labs     Bone density    7/5/23 DXA  FINDINGS:  The bone mineral density measured from L1 through L4 is 1.307g/cm2.  This corresponds to a T score of 0.6 and a Z score of 0.5.  Decrease in bone mineral density by 4%.     The bone mineral density within the left femoral neck measures 0.816 g/cm2.  This corresponds to a T score of -2.0 and a Z score of -1.1.     The bone mineral density within the right femoral neck measures 0.855 g/cm2.  This corresponds to a T score of -1.7 and a Z score of -0.8.  Decrease in bone mineral density by 10%.     FRAX RESULTS:     10-year Probability of Fracture:   Major Osteoporotic Fracture 10.3%.   Hip Fracture 4.1%.   Impression:     Osteopenia both hips with decrease in bone mineral density by 10%.   Latest Reference Range & Units 09/29/23 15:01   Vit D, 25-Hydroxy 30 - 96 ng/mL 30     With regards to dyslipidemia,   Unable to tolerate statin- allergy  States he has tried several in the past and caused heart arrhythmia   Does not want to try praulent or repatha      Latest Reference Range & Units 12/01/23 08:05   Cholesterol Total 120 - 199 mg/dL 221 (H)   HDL 40 - 75 mg/dL 61   HDL/Cholesterol Ratio 20.0 - 50.0 % 27.6   Non-HDL Cholesterol mg/dL 160   Total Cholesterol/HDL Ratio 2.0 - 5.0  3.6   Triglycerides 30 - 150 mg/dL 157 (H)   LDL Cholesterol 63.0 - 159.0 mg/dL 128.6   (H): Data is abnormally high    With regards to elevated prolactin-now normal,    Now normal    Currently pt reports no galactorrhea  + diplopia that recently started around 2 months ago  - chronic headaches    No unexplained weight changes.  + fatigue  He does report some sleep problems. Has pulse ox- decreasing O2. States he has CPAP but unable to tolerate mask. States he has scarring on trachea from intubation in the past    Denies cold intolerance   No nausea or vomiting  +  orthostatic symptoms     The patient is not currently using any medication known to cause hyperprolactinemia such as: antipsychotics (risperidone, phenothiazines, haloperidol and butyrophenones),gastric motility drugs (metoclopramide and domperidone), or antihypertensives (methyldopa, reserpine, and verapamil).     Latest Reference Range & Units 06/21/22 07:23   Prolactin 3.5 - 19.4 ng/mL 28.8 (H)      Latest Reference Range & Units 06/21/22 07:23   FSH 0.95 - 11.95 mIU/mL 5.93   LH 0.6 - 12.1 mIU/mL 2.4   Prolactin 3.5 - 19.4 ng/mL 28.8 (H)   Sex Hormone Binding Globulin 22 - 77 nmol/L 79 (H)   Testosterone 250 - 1100 ng/dL 542   Testosterone, Bioavailable 110.0 - 575.0 ng/dL 63.7 (L)   Testosterone, Free 46.0 - 224.0 pg/mL 33.8 (L)      Latest Reference Range & Units 06/21/22 07:23 07/19/23 09:17   Prolactin 3.5 - 19.4 ng/mL 28.8 (H) 19.0   (H): Data is abnormally high    FIB-4 Calculation: 2.64 at 5/21/2024  3:03 PM     FIB-4 below 1.30 is considered as low-risk for advanced fibrosis  FIB-4 over 2.67 is considered as high-risk for advanced fibrosis  FIB-4 values between 1.30 and 2.67 are considered as intermediate-risk of advanced fibrosis for ages 36-64.     For ages > 64 the cut-off for low-risk goes to < 2.  This is a screening tool and clinical judgement should be used in the interpretation of these results.    Reviewed past medical, family, social history and updated as appropriate.     Review of Systems   Constitutional:  Positive for fatigue. Negative for unexpected weight change.   Eyes:  Negative for visual disturbance.   Endocrine: Positive for polyphagia. Negative for polydipsia and polyuria.        Hair loss   Musculoskeletal:  Positive for arthralgias, back pain and gait problem.     As above    Objective:     There were no vitals filed for this visit.    Prior vitals:  BP Readings from Last 5 Encounters:   09/01/24 123/80   08/20/24 115/73   08/08/24 (!) 140/86   07/29/24 124/75   07/25/24 101/71      Physical Exam  Neurological:      Mental Status: He is alert.       Wt Readings from Last 10 Encounters:   09/01/24 0145 70.3 kg (155 lb)   08/26/24 1344 70.3 kg (155 lb)   08/19/24 2256 83.5 kg (184 lb)   08/07/24 2157 83.5 kg (184 lb)   07/14/24 0430 83.6 kg (184 lb 4.9 oz)   07/07/24 0430 83.1 kg (183 lb 3.2 oz)   07/06/24 0430 83.3 kg (183 lb 10.3 oz)   07/05/24 0430 83.5 kg (184 lb 1.4 oz)   07/04/24 1201 81.5 kg (179 lb 10.8 oz)   07/02/24 1405 81.5 kg (179 lb 10.8 oz)   07/01/24 0430 81.5 kg (179 lb 10.8 oz)   06/28/24 2045 80.6 kg (177 lb 11.1 oz)   06/28/24 1512 97.5 kg (215 lb)   06/26/24 2221 99.8 kg (220 lb)   06/06/24 0134 99.8 kg (220 lb)   06/01/24 2351 99.8 kg (220 lb)   05/20/24 1129 92 kg (202 lb 13.2 oz)   04/30/24 0434 92.6 kg (204 lb 2.3 oz)   04/26/24 2241 91.3 kg (201 lb 4.5 oz)   04/26/24 1525 91.3 kg (201 lb 4.5 oz)     Lab Results   Component Value Date    HGBA1C 8.5 (H) 01/13/2025     Lab Results   Component Value Date    CHOL 141 08/19/2024    HDL 24 (L) 08/19/2024    LDLCALC 79.4 08/19/2024    TRIG 188 (H) 08/19/2024    CHOLHDL 17.0 (L) 08/19/2024     Lab Results   Component Value Date     (L) 08/19/2024    K 4.1 08/19/2024     08/19/2024    CO2 24 08/19/2024     (H) 08/19/2024    BUN 7 (L) 08/19/2024    CREATININE 0.6 08/19/2024    CALCIUM 7.6 (L) 08/19/2024    PROT 5.9 (L) 08/19/2024    ALBUMIN 1.5 (L) 08/19/2024    BILITOT 1.0 08/19/2024    ALKPHOS 450 (H) 08/19/2024    AST 45 (H) 08/19/2024    ALT 25 08/19/2024    ANIONGAP 8 08/19/2024    ESTGFRAFRICA >60 07/21/2022    EGFRNONAA >60 07/21/2022    TSH 3.731 08/19/2024      Lab Results   Component Value Date    MICALBCREAT 19.0 03/01/2024     Assessment/Plan:     1. Type 2 diabetes mellitus with other circulatory complication, with long-term current use of insulin  Hemoglobin A1C    insulin degludec (TRESIBA FLEXTOUCH U-200) 200 unit/mL (3 mL) insulin pen    insulin pump cart,auto,BT,G6/7 (OMNIPOD 5 G6-G7 PODS,  "GEN 5,) Crtg    insulin  cart,aut,G6/7,cntr (OMNIPOD 5 G6-G7 INTRO KT,GEN5,) Crtg    BD ULTRA-FINE RAHUL PEN NEEDLE 32 gauge x 5/32" Ndle    insulin pen,reusable,BT,aspart (INPEN, NOVOLOG OR FIASP, BLUE) InPn    insulin aspart, NovoLOG, (NOVOLOG PENFILL U-100 INSULIN) 100 unit/mL Crtg 100 unit/mL cartridge      2. Osteoporosis, unspecified osteoporosis type, unspecified pathological fracture presence  Protein Electrophoresis, Serum    PTH, Intact    Tissue Transglutaminase, IgA    Renal Function Panel    Protein Electrophoresis, Serum    PTH, Intact    Tissue Transglutaminase, IgA    Renal Function Panel    Tissue Transglutaminase, IgA    Renal Function Panel    Protein Electrophoresis, Serum    PTH, Intact    Tissue Transglutaminase, IgA    Renal Function Panel    Protein Electrophoresis, Serum    PTH, Intact    CANCELED: Renal Function Panel    CANCELED: PTH, Intact    CANCELED: Tissue Transglutaminase, IgA    CANCELED: Protein Electrophoresis, Serum    CANCELED: Protein Electrophoresis, Serum    CANCELED: PTH, Intact    CANCELED: Renal Function Panel    CANCELED: Tissue Transglutaminase, IgA      3. Class 1 obesity due to excess calories with serious comorbidity and body mass index (BMI) of 34.0 to 34.9 in adult        4. NAFLD (nonalcoholic fatty liver disease)        5. Hyperlipidemia, unspecified hyperlipidemia type        6. Essential hypertension        7. Severe obesity        8. Other closed fracture of second lumbar vertebra with routine healing, subsequent encounter          Type 2 diabetes mellitus, with long-term current use of insulin  Reviewed goals of therapy - to get the best control we can without hypoglycemia. Goal <7.5   - last a1c at goal    A1c rising   Back on steroids   Has been on Trulicity and Mounjaro in the past   Has had some GI issues. Would like to avoid at this time     Dexcom reviewed: blood sugars above goal   Medication Changes   Increase and change Tresiba to 14 units twice " daily   Continue Novolog I:C ratio 1:8 and change sliding scale to below   With using correction scale:  MDD of:   150-200: +2 units  201-250: +4 units  251-300: +6 units  301-350: +8 units  >350: +10 units    He is interested in inpen while waiting on omnipod. We will call in.  He is interested in restarting omnipod. Will call in.    -Advised frequent self blood glucose monitoring. Patient encouraged to document glucose results and bring them to every clinic visit    -Hypoglycemia precautions discussed. Instructed on precautions before driving.    -Close adherence to lifestyle changes recommended.    -Periodic follow ups for eye evaluations, foot care suggested.  Not on statin due to allergy - consider PCSK9 in future    Osteoporosis  Based on FRAX score and now based on fracture after fall    Avoid falls. He stopped smoking. Encouraged exercise per PT. Continue vitamin D and calcium in diet. Has significant reflux - would avoid PO bisphosphonate's     Reclast -having significant RA pains at this time Will avoid for now. Prolia -he would like to avoid due to increased risk of infection due to steroids  Having hypocalcemia and weight loss.     Check DXA per rheumatology   He is now interested in treatment. I recommend anabolic.   Will check secondary causes first    Consider forteo or tymlos    Class 1 obesity due to excess calories with serious comorbidity and body mass index (BMI) of 34.0 to 34.9 in adult  Losing weight     NAFLD (nonalcoholic fatty liver disease)  Monitor LFTs  Seeing hepatology     Hyperlipidemia  Allergy to statin  Not interested in trying PCSK9    Essential hypertension  bp controlled  continue regimen  F/u with PCP, monitor      Severe obesity  Losing weight.   -- encouraged dietary and lifestyle modifications   -- emphasized weight loss goals       Lumbar vertebral fracture  See osteoporosis     Visit today included increased complexity associated with the care of episodic problems type 2  diabetes, hyperlipidemia, and osteoporosis addressed and managing longitudinal care of the patient due to serious managed problems type 2 diabetes, hyperlipidemia, and osteoporosis     Agrees to look at AVS    Follow up in about 4 months (around 6/10/2025).  Labs at

## 2025-02-10 ENCOUNTER — OFFICE VISIT (OUTPATIENT)
Dept: ENDOCRINOLOGY | Facility: CLINIC | Age: 70
End: 2025-02-10
Payer: MEDICARE

## 2025-02-10 ENCOUNTER — TELEPHONE (OUTPATIENT)
Dept: PHARMACY | Facility: CLINIC | Age: 70
End: 2025-02-10
Payer: MEDICARE

## 2025-02-10 ENCOUNTER — PATIENT MESSAGE (OUTPATIENT)
Dept: ENDOCRINOLOGY | Facility: CLINIC | Age: 70
End: 2025-02-10

## 2025-02-10 DIAGNOSIS — E11.59 TYPE 2 DIABETES MELLITUS WITH OTHER CIRCULATORY COMPLICATION, WITH LONG-TERM CURRENT USE OF INSULIN: Primary | ICD-10-CM

## 2025-02-10 DIAGNOSIS — K76.0 NAFLD (NONALCOHOLIC FATTY LIVER DISEASE): ICD-10-CM

## 2025-02-10 DIAGNOSIS — E66.09 CLASS 1 OBESITY DUE TO EXCESS CALORIES WITH SERIOUS COMORBIDITY AND BODY MASS INDEX (BMI) OF 34.0 TO 34.9 IN ADULT: ICD-10-CM

## 2025-02-10 DIAGNOSIS — M81.0 OSTEOPOROSIS, UNSPECIFIED OSTEOPOROSIS TYPE, UNSPECIFIED PATHOLOGICAL FRACTURE PRESENCE: ICD-10-CM

## 2025-02-10 DIAGNOSIS — E78.5 HYPERLIPIDEMIA, UNSPECIFIED HYPERLIPIDEMIA TYPE: ICD-10-CM

## 2025-02-10 DIAGNOSIS — I10 ESSENTIAL HYPERTENSION: ICD-10-CM

## 2025-02-10 DIAGNOSIS — S32.028D OTHER CLOSED FRACTURE OF SECOND LUMBAR VERTEBRA WITH ROUTINE HEALING, SUBSEQUENT ENCOUNTER: ICD-10-CM

## 2025-02-10 DIAGNOSIS — E66.811 CLASS 1 OBESITY DUE TO EXCESS CALORIES WITH SERIOUS COMORBIDITY AND BODY MASS INDEX (BMI) OF 34.0 TO 34.9 IN ADULT: ICD-10-CM

## 2025-02-10 DIAGNOSIS — Z79.4 TYPE 2 DIABETES MELLITUS WITH OTHER CIRCULATORY COMPLICATION, WITH LONG-TERM CURRENT USE OF INSULIN: Primary | ICD-10-CM

## 2025-02-10 DIAGNOSIS — E66.01 SEVERE OBESITY: ICD-10-CM

## 2025-02-10 RX ORDER — PEN NEEDLE, DIABETIC 31 GX5/16"
NEEDLE, DISPOSABLE MISCELLANEOUS
Qty: 600 EACH | Refills: 11 | Status: SHIPPED | OUTPATIENT
Start: 2025-02-10

## 2025-02-10 RX ORDER — INSULIN ASPART 100 [IU]/ML
INJECTION, SOLUTION INTRAVENOUS; SUBCUTANEOUS
Qty: 30 ML | Refills: 11 | Status: SHIPPED | OUTPATIENT
Start: 2025-02-10

## 2025-02-10 RX ORDER — INSULIN PEN,REUSABLE,BT LISPRO
INSULIN PEN (EA) SUBCUTANEOUS
Qty: 1 EACH | Refills: 1 | Status: SHIPPED | OUTPATIENT
Start: 2025-02-10

## 2025-02-10 RX ORDER — INSULIN DEGLUDEC 200 U/ML
14 INJECTION, SOLUTION SUBCUTANEOUS 2 TIMES DAILY
Qty: 4.2 ML | Refills: 11 | Status: SHIPPED | OUTPATIENT
Start: 2025-02-10 | End: 2026-02-10

## 2025-02-10 RX ORDER — INSULIN PMP CART,AUT,G6/7,CNTR
1 EACH SUBCUTANEOUS
Qty: 10 EACH | Refills: 11 | Status: SHIPPED | OUTPATIENT
Start: 2025-02-10 | End: 2026-02-10

## 2025-02-10 RX ORDER — INSULIN PMP CART,AUT,G6/7,CNTR
EACH SUBCUTANEOUS
Qty: 1 EACH | Refills: 0 | Status: SHIPPED | OUTPATIENT
Start: 2025-02-10

## 2025-02-10 NOTE — ASSESSMENT & PLAN NOTE
Losing weight.   -- encouraged dietary and lifestyle modifications   -- emphasized weight loss goals      The patient is a 1y8m Male complaining of seizures.

## 2025-02-10 NOTE — LETTER
February 10, 2025    Nithin MELENDREZ Bernard  [address] [facility] [address]           Dear Mr. Wagner,      My name is Celeste Johnson  I am reaching out on behalf of [facility]s [facility] Patient Assistance Team in regards to your request for medication assistance. Our goal is to assist qualified [facility] patients with obtaining financial assistance for prescribed medications.     Please note that enrollment into available support may require the following documents:      Proof of household Income (such as social security award letter, pension statement,1040)  Copy of all insurance cards (front and back)  Print out from your insurance or pharmacy showing how much you have spent on prescriptions for the current year  Completed Medication Access Center Authorization Forms        Please reach out to my phone number below if you are still in need of assistance with your medications. Follow-up call will be made in 5 business days. We look forward to hearing from you soon!    Thank you for choosing [facility] for your healthcare needs      Sincerely  Celeste MUNOZ @[phone]  Pharmacy Patient Assistance Team  [address] [place] [address]  [id number]  [place] [address]  Fax: [phone]  Email: [email or web address]

## 2025-02-10 NOTE — ASSESSMENT & PLAN NOTE
Reviewed goals of therapy - to get the best control we can without hypoglycemia. Goal <7.5   - last a1c at goal    A1c rising   Back on steroids   Has been on Trulicity and Mounjaro in the past   Has had some GI issues. Would like to avoid at this time     Dexcom reviewed: blood sugars above goal   Medication Changes   Increase and change Tresiba to 14 units twice daily   Continue Novolog I:C ratio 1:8 and change sliding scale to below   With using correction scale:  MDD of:   150-200: +2 units  201-250: +4 units  251-300: +6 units  301-350: +8 units  >350: +10 units    He is interested in inpen while waiting on omnipod. We will call in.  He is interested in restarting omnipod. Will call in.    -Advised frequent self blood glucose monitoring. Patient encouraged to document glucose results and bring them to every clinic visit    -Hypoglycemia precautions discussed. Instructed on precautions before driving.    -Close adherence to lifestyle changes recommended.    -Periodic follow ups for eye evaluations, foot care suggested.  Not on statin due to allergy - consider PCSK9 in future

## 2025-02-10 NOTE — TELEPHONE ENCOUNTER
We have reached out to Mr. Wagner via letter and portal message  to inform him of the [employer] application process for Novolog Pens, Pen Needles, and Tresiba Pen. A follow-up phone call will be made in 5 business days.    Celeste Johnson  Pharmacy Patient Assistance Team

## 2025-02-10 NOTE — ASSESSMENT & PLAN NOTE
Based on FRAX score and now based on fracture after fall    Avoid falls. He stopped smoking. Encouraged exercise per PT. Continue vitamin D and calcium in diet. Has significant reflux - would avoid PO bisphosphonate's     Reclast -having significant RA pains at this time Will avoid for now. Prolia -he would like to avoid due to increased risk of infection due to steroids  Having hypocalcemia and weight loss.     Check DXA per rheumatology   He is now interested in treatment. I recommend anabolic.   Will check secondary causes first    Consider forteo or tymlos

## 2025-02-10 NOTE — Clinical Note
A1c on RTC  Omnipod to [facility] medication  Labs PTH RFP TTG SPEP to home health - call for fax number [phone] [facility]

## 2025-02-10 NOTE — PATIENT INSTRUCTIONS
"Diabetes   A1c rising   Back on steroids   Has been on Trulicity and Mounjaro in the past   Has had some GI issues. Would like to avoid at this time     Dexcom reviewed: blood sugars above goal   Medication Changes   Increase and change Tresiba to 14 units twice daily   Continue Novolog I:C ratio 1:8 and change sliding scale to below   With using correction scale:  MDD of:   150-200: +2 units  201-250: +4 units  251-300: +6 units  301-350: +8 units  >350: +10 units    He is interested in inpen while waiting on omnipod. We will call in.  He is interested in restarting omnipod. Will call in.     Osteoporosis   Check DXA per rheumatology   He is now interested in treatment. I recommend anabolic.   Will check secondary causes first      Osteoporosis medications  These are the medications we discussed for osteoporosis treatment:    - Bisphosphonates:         - these slow down bone loss         - oral forms (Fosamax and Actonel are examples) - taken once weekly or once monthly         - IV form (Reclast) - given intravenously once yearly    - Prolia (denosumab):          - slows down bone loss           - it is a subcutaneous injection (under the skin) every 6 months, given in the office    - Forteo (teriparatide) or Tymlos (abaloparatide):           - medications that "build bone" or increases bone formation           - subcutaneous injection (under the skin) taken once daily that you self-administer at home for 18-24 months    -Evenity (romosozumab)   -medications that "build bone" or increases bone formation   - subcutaneous injection (under the skin) taken once monthly for one year    For more information on medications: [email or web address]    "

## 2025-02-14 DIAGNOSIS — E11.59 TYPE 2 DIABETES MELLITUS WITH OTHER CIRCULATORY COMPLICATION, WITH LONG-TERM CURRENT USE OF INSULIN: ICD-10-CM

## 2025-02-14 DIAGNOSIS — Z79.4 TYPE 2 DIABETES MELLITUS WITH OTHER CIRCULATORY COMPLICATION, WITH LONG-TERM CURRENT USE OF INSULIN: ICD-10-CM

## 2025-02-16 RX ORDER — INSULIN PMP CART,AUT,G6/7,CNTR
1 EACH SUBCUTANEOUS
Qty: 10 EACH | Refills: 11 | Status: SHIPPED | OUTPATIENT
Start: 2025-02-16 | End: 2026-02-16

## 2025-02-16 RX ORDER — INSULIN PMP CART,AUT,G6/7,CNTR
EACH SUBCUTANEOUS
Qty: 1 EACH | Refills: 0 | Status: SHIPPED | OUTPATIENT
Start: 2025-02-16

## 2025-02-18 ENCOUNTER — TELEPHONE (OUTPATIENT)
Dept: ENDOCRINOLOGY | Facility: CLINIC | Age: 70
End: 2025-02-18
Payer: MEDICARE

## 2025-02-18 ENCOUNTER — PATIENT MESSAGE (OUTPATIENT)
Dept: ENDOCRINOLOGY | Facility: CLINIC | Age: 70
End: 2025-02-18
Payer: MEDICARE

## 2025-02-18 NOTE — TELEPHONE ENCOUNTER
Returned patient's call back about diabetes supplies.      Patient calling to request orders for Diabetic Supplies. Pls send: [phone] [phone]. Pls call back as soon possible. [phone]

## 2025-02-22 NOTE — ASSESSMENT & PLAN NOTE
Problem: Fall Risk  Goal: Patient will remain free from falls  Description: Target End Date:  Prior to discharge or change in level of care    Document interventions on the [facility] Fall Risk Assessment    1.  Assess for fall risk factors  2.  Implement fall precautions  Outcome: Progressing   The patient is Watcher - Medium risk of patient condition declining or worsening    Shift Goals  Clinical Goals: Neuro checks, pain control  Patient Goals: [place]  Family Goals: remain updated on plan of care    Progress made toward(s) clinical / shift goals:  Bed in lowest and locked position.  Mom at bedside.  Q2 rounds in place     Patient is not progressing towards the following goals:       Reviewed goals of therapy - to get the best control we can without hypoglycemia. Goal <7.5   - last a1c pretty much at goal  On pump review, overall sugar at goal    Not interested in additional copay with mounjaro    Unable to link to [employer] to clinic. Will get him connected to clinic.  Medication Changes   Continue below but will change to fiasp as his insurance now prefers Fiasp  INPEN with Fiasp cartridges   Average <100 units of novolog daily   I:C 1:5 and states he is sometimes changing 1:15 if lower carb  ISF 20  Max    with  Tresiba U200 30 units daily   Tresiba U100 60 units daily    Current regimen:  Tresiba 30 units daily  Omnipod 5    Basal 2.4  Carb 5  ISF 20    Reviewed pump data and blood sugars are at goal.     Reviewed patient's current insulin regimen. Clarified proper insulin dose and timing in relation to meals, etc. Insulin injection sites and proper rotation instructed.   - reviewed dose adjustments as well.   -Advised frequent self blood glucose monitoring. Patient encouraged to document glucose results and bring them to every clinic visit    -Hypoglycemia precautions discussed. Instructed on precautions before driving.    -Close adherence to lifestyle changes recommended.    -Periodic follow ups for eye evaluations, foot care suggested.  Not on statin due to allergy - consider PCSK9 in future

## 2025-03-09 ENCOUNTER — PATIENT MESSAGE (OUTPATIENT)
Dept: ENDOCRINOLOGY | Facility: CLINIC | Age: 70
End: 2025-03-09
Payer: MEDICARE

## 2025-03-09 DIAGNOSIS — Z79.4 TYPE 2 DIABETES MELLITUS WITH OTHER CIRCULATORY COMPLICATION, WITH LONG-TERM CURRENT USE OF INSULIN: ICD-10-CM

## 2025-03-09 DIAGNOSIS — E11.59 TYPE 2 DIABETES MELLITUS WITH OTHER CIRCULATORY COMPLICATION, WITH LONG-TERM CURRENT USE OF INSULIN: ICD-10-CM

## 2025-03-10 ENCOUNTER — PATIENT MESSAGE (OUTPATIENT)
Dept: ENDOCRINOLOGY | Facility: CLINIC | Age: 70
End: 2025-03-10
Payer: MEDICARE

## 2025-03-10 ENCOUNTER — TELEPHONE (OUTPATIENT)
Dept: ENDOCRINOLOGY | Facility: CLINIC | Age: 70
End: 2025-03-10
Payer: MEDICARE

## 2025-03-10 DIAGNOSIS — Z79.4 TYPE 2 DIABETES MELLITUS WITH OTHER CIRCULATORY COMPLICATION, WITH LONG-TERM CURRENT USE OF INSULIN: ICD-10-CM

## 2025-03-10 DIAGNOSIS — E11.59 TYPE 2 DIABETES MELLITUS WITH OTHER CIRCULATORY COMPLICATION, WITH LONG-TERM CURRENT USE OF INSULIN: ICD-10-CM

## 2025-03-10 RX ORDER — INSULIN PEN,REUSABLE,BT LISPRO
INSULIN PEN (EA) SUBCUTANEOUS
Qty: 1 EACH | Refills: 1 | Status: SHIPPED | OUTPATIENT
Start: 2025-03-10 | End: 2025-03-11 | Stop reason: SDUPTHER

## 2025-03-10 RX ORDER — INSULIN ASPART 100 [IU]/ML
INJECTION, SOLUTION INTRAVENOUS; SUBCUTANEOUS
Qty: 30 ML | Refills: 11 | Status: SHIPPED | OUTPATIENT
Start: 2025-03-10

## 2025-03-10 RX ORDER — INSULIN ASPART 100 [IU]/ML
10 INJECTION, SOLUTION INTRAVENOUS; SUBCUTANEOUS
Qty: 9 ML | Refills: 11 | Status: SHIPPED | OUTPATIENT
Start: 2025-03-10 | End: 2026-03-10

## 2025-03-11 ENCOUNTER — OFFICE VISIT (OUTPATIENT)
Dept: ENDOCRINOLOGY | Facility: CLINIC | Age: 70
End: 2025-03-11
Payer: MEDICARE

## 2025-03-11 DIAGNOSIS — M06.9 RHEUMATOID ARTHRITIS FLARE: ICD-10-CM

## 2025-03-11 DIAGNOSIS — E66.811 CLASS 1 OBESITY DUE TO EXCESS CALORIES WITH SERIOUS COMORBIDITY AND BODY MASS INDEX (BMI) OF 34.0 TO 34.9 IN ADULT: ICD-10-CM

## 2025-03-11 DIAGNOSIS — M81.0 OSTEOPOROSIS, UNSPECIFIED OSTEOPOROSIS TYPE, UNSPECIFIED PATHOLOGICAL FRACTURE PRESENCE: ICD-10-CM

## 2025-03-11 DIAGNOSIS — Z79.4 TYPE 2 DIABETES MELLITUS WITH OTHER CIRCULATORY COMPLICATION, WITH LONG-TERM CURRENT USE OF INSULIN: Primary | ICD-10-CM

## 2025-03-11 DIAGNOSIS — E78.5 HYPERLIPIDEMIA, UNSPECIFIED HYPERLIPIDEMIA TYPE: ICD-10-CM

## 2025-03-11 DIAGNOSIS — E11.59 TYPE 2 DIABETES MELLITUS WITH OTHER CIRCULATORY COMPLICATION, WITH LONG-TERM CURRENT USE OF INSULIN: Primary | ICD-10-CM

## 2025-03-11 DIAGNOSIS — E55.9 VITAMIN D DEFICIENCY: ICD-10-CM

## 2025-03-11 DIAGNOSIS — R74.8 ELEVATED ALKALINE PHOSPHATASE LEVEL: ICD-10-CM

## 2025-03-11 DIAGNOSIS — I10 ESSENTIAL HYPERTENSION: ICD-10-CM

## 2025-03-11 DIAGNOSIS — E66.09 CLASS 1 OBESITY DUE TO EXCESS CALORIES WITH SERIOUS COMORBIDITY AND BODY MASS INDEX (BMI) OF 34.0 TO 34.9 IN ADULT: ICD-10-CM

## 2025-03-11 PROCEDURE — 98007 SYNCH AUDIO-VIDEO EST HI 40: CPT | Mod: 95,,, | Performed by: NURSE PRACTITIONER

## 2025-03-11 PROCEDURE — 1160F RVW MEDS BY RX/DR IN RCRD: CPT | Mod: CPTII,95,, | Performed by: NURSE PRACTITIONER

## 2025-03-11 PROCEDURE — G2211 COMPLEX E/M VISIT ADD ON: HCPCS | Mod: 95,,, | Performed by: NURSE PRACTITIONER

## 2025-03-11 PROCEDURE — 1159F MED LIST DOCD IN RCRD: CPT | Mod: CPTII,95,, | Performed by: NURSE PRACTITIONER

## 2025-03-11 PROCEDURE — 3052F HG A1C>EQUAL 8.0%<EQUAL 9.0%: CPT | Mod: CPTII,95,, | Performed by: NURSE PRACTITIONER

## 2025-03-11 PROCEDURE — 4010F ACE/ARB THERAPY RXD/TAKEN: CPT | Mod: CPTII,95,, | Performed by: NURSE PRACTITIONER

## 2025-03-11 PROCEDURE — 99213 OFFICE O/P EST LOW 20 MIN: CPT | Performed by: NURSE PRACTITIONER

## 2025-03-11 RX ORDER — INSULIN PEN,REUSABLE,BT LISPRO
INSULIN PEN (EA) SUBCUTANEOUS
Qty: 1 EACH | Refills: 1 | Status: SHIPPED | OUTPATIENT
Start: 2025-03-11

## 2025-03-11 RX ORDER — INSULIN DEGLUDEC 200 U/ML
16 INJECTION, SOLUTION SUBCUTANEOUS 2 TIMES DAILY
Qty: 4.8 ML | Refills: 11 | Status: SHIPPED | OUTPATIENT
Start: 2025-03-11 | End: 2026-03-11

## 2025-03-11 RX ORDER — PREDNISONE 2.5 MG/1
2.5 TABLET ORAL 2 TIMES DAILY
Qty: 60 TABLET | Refills: 11 | Status: SHIPPED | OUTPATIENT
Start: 2025-03-11 | End: 2026-03-11

## 2025-03-11 NOTE — PATIENT INSTRUCTIONS
Diabetes   A1c rising   Check A1c  Back on steroids   Has been on Trulicity and Mounjaro in the past. Has had some GI issues. Would like to avoid at this time     He would like to get back on INPEN Will call in     [phone] [facility] in   [facility] reviewed: blood sugars improving but higher after dinner   He is not giving insulin with snack.     Medication Changes   Increase Tresiba to 16 units twice daily   Continue Novolog I:C ratio 1:8 and change sliding scale to below   With using correction scale:  MDD of:   150-200: +2 units  201-250: +4 units  251-300: +6 units  301-350: +8 units  >350: +10 units  Add Novolog 4 units with snack. If your blood sugar drops <70 then decrease novolog to 2 units before snacks.   If you are skipping a meal at what would be meal time, give sliding scale only   Try to give novolog no sooner than every 4 hours     He is interested in inpen while waiting on omnipod.     Osteoporosis   He is now interested in treatment. I recommend anabolic.   Will check secondary causes first    Will have him do labs     Tscore  -1.0 osteopenia  -2.5 osteoporosis  -3.0 is severe osteoporosis     FRAX score   >20% major bone in body   >3% hip    Recommended daily allowance of calcium is 8769-6491 mg daily, preferably from food. See below  Recommended daily allowance of vitamin D is 1453-6466 IU daily    Encouraged weight bearing exercise  Encouraged to avoid falls  Avoid alcohol and tobacco    Estimated Calcium Content of Foods:  Produce  Serving Size Estimated Calcium*    Rebeca greens, frozen 8 oz 360 mg   Broccoli gabbie 8 oz 200 mg   Kale, frozen 8 oz 180 mg   Soy Beans, green, boiled 8 oz 175 mg   Bok Nu, cooked, boiled 8 oz 160 mg   Figs, dried 2 figs 65 mg   Broccoli, fresh, cooked 8 oz 60 mg   Oranges 1 whole 55 mg   Seafood Serving Size Estimated Calcium*    Sardines, canned with bones 3 oz 325 mg   [place], canned with bones 3 oz 180 mg   Shrimp, canned 3 oz 125 mg   Dairy Serving Size  Estimated Calcium*    Ricotta, part-skim 4 oz 335 mg   Yogurt, plain, low-fat 6 oz 310 mg   Milk, skim, low-fat, whole 8 oz 300 mg   Yogurt with fruit, low-fat 6 oz 260 mg   Mozzarella, part-skim 1 oz 210 mg   Cheddar 1 oz 205 mg   Yogurt, Greek 6 oz 200 mg   American Cheese 1 oz 195 mg   Feta Cheese 4 oz 140 mg   Cottage Cheese, 2% 4 oz 105 mg   Frozen yogurt, vanilla 8 oz 105 mg   Ice Cream, vanilla 8 oz 85 mg   Parmesan 1 tbsp 55 mg   Fortified Food Serving Size Estimated Calcium*   Section milk, rice milk or soy milk, fortified 8 oz 300 mg   Orange juice and other fruit juices, fortified 8 oz 300 mg   Tofu, prepared with calcium 4 oz 205 mg   Waffle, frozen, fortified 2 pieces 200 mg   Oatmeal, fortified 1 packet 140 mg   English muffin, fortified 1 muffin 100 mg   Cereal, fortified 8 oz 100-1,000 mg   Other Serving Size Estimated Calcium*   Mac & cheese, frozen 1 package 325 mg   Pizza, cheese, frozen 1 serving 115 mg   Pudding, chocolate, prepared with 2% milk 4 oz 160 mg   Beans, baked, canned 4 oz 160 mg   *The calcium content listed for most foods is estimated and can vary due to multiple factors. Check the food label to determine how much calcium is in a particular product.  If you read the nutrition label for a food source, it lists the % calcium in that food.  For an 8 oz glass of milk, for example, the label states calcium 30%.  This is equivalent to 300 mg of calcium (multiply the listed number by 10).   **Table from the National Osteoporosis Foundation      Osteoporosis medications  These are the medications we discussed for osteoporosis treatment:    - Bisphosphonates:         - these slow down bone loss         - oral forms (Fosamax and Actonel are examples) - taken once weekly or once monthly. SE: esophageal burning          - IV form (Reclast) - given intravenously once yearly    Reclast: There are a few things I ask my patients to do before receiving reclast infusion.   1) The day before and day  "of the infusion please drink plenty of fluids.   2) The day of the infusion take over the counter tylenol one dose every six to eight hours for one day. This minimizes the joint pains that can occur with reclast.  3) Take an extra dose of over the counter vitamin D the day before the infusion.    - Prolia (denosumab):          - slows down bone loss           - it is a subcutaneous injection (under the skin) every 6 months, given in the office    Side effects of Prolia reviewed, including risk of hypocalcemia, eczema/skin complications and possible increased risk of infections.  Also reviewed association with ONJ and rare atypical femur fractures.  Advise to see the dentist regularly, notify dentist of using this medication and avoid non-emergent dental implants and extractions.  Also advise to contact us if develops new, persistent thigh or groin pain.    Discussed ongoing concern and accumulating data describing rebound-associated vertebral fractures (RAVFs) after denosumab discontinuation.  We now know that discontinuation of denosumab is associated with up to 50% reduction in BMD that is only partially blocked by Reclast.  Current recommendations are to either continue indefinitely or to switch to oral bisphosphonate for at least a year.    ANABOLIC's BELOW: BUILD UP BONE    - Forteo (teriparatide) or Tymlos (abaloparatide):           - medications that "build bone" or increases bone formation           - subcutaneous injection (under the skin) taken once daily that you self-administer at home for 18-24 months    SE: headaches; dizziness; bone pains  TAKE AT NIGHT    -Evenity (romosozumab)   -medications that "build bone" or increases bone formation   - subcutaneous injection (under the skin) taken once monthly for one year    Evenity (210mg) once a month subcutaneous injection used for one year. Blocks the effects of sclerostin, a protein that prevents bone production and causes bone breakdown. Dual action of " bone building and antiresorptive.  Trials did show there was an increased risk of cardiovascular disease (CI in patients with history of MI or stroke).  Increased risk is 50% from baseline and 1.5% relative risk.  Rare, but a possibility.   Most common side effects are joint pain and headaches.  Use it for one year followed by antiresorptive - Prolia or bisphosphonate. Use prolia.     Receive injection in infusion center.     For more information on medications: https://www.nof.org/patients/treatment/medicationadherence/

## 2025-03-11 NOTE — ASSESSMENT & PLAN NOTE
Reviewed goals of therapy - to get the best control we can without hypoglycemia. Goal <7.5   - last a1c at goal    A1c rising   Check A1c  Back on steroids   Has been on Trulicity and Mounjaro in the past. Has had some GI issues. Would like to avoid at this time     He would like to get back on INPEN Will call in     Dexcom reviewed: blood sugars improving but higher after dinner   He is not giving insulin with snack.     Medication Changes   Increase Tresiba to 16 units twice daily   Continue Novolog I:C ratio 1:8 and change sliding scale to below   With using correction scale:  MDD of:   150-200: +2 units  201-250: +4 units  251-300: +6 units  301-350: +8 units  >350: +10 units  Add Novolog 4 units with snack.  If your blood sugar drops <70 then decrease novolog to 2 units before snacks.   If you are skipping a meal at what would be meal time, give sliding scale only   Try to give novolog no sooner than every 4 hours  He is interested in inpen while waiting on omnipod.      -Advised frequent self blood glucose monitoring. Patient encouraged to document glucose results and bring them to every clinic visit    -Hypoglycemia precautions discussed. Instructed on precautions before driving.    -Close adherence to lifestyle changes recommended.    -Periodic follow ups for eye evaluations, foot care suggested.  Not on statin due to allergy - consider PCSK9 in future

## 2025-03-11 NOTE — ASSESSMENT & PLAN NOTE
Based on FRAX score and now based on fracture after fall    Avoid falls. He stopped smoking. Encouraged exercise per PT. Continue vitamin D and calcium in diet. Has significant reflux - would avoid PO bisphosphonate's     Reclast -having significant RA pains at this time Will avoid for now. Prolia -he would like to avoid due to increased risk of infection due to steroids  Having hypocalcemia and weight loss.     He is now interested in treatment. I recommend anabolic.   Will check secondary causes first    Consider forteo or tymlos

## 2025-03-11 NOTE — PROGRESS NOTES
Subjective:      Chief Complaint: No chief complaint on file.    HPI: Nithin Wagner is a 69 y.o. male with diabetes, dyslipidemia, hypertension, diastolic heart failure, PVD, RA, vitamin D deficiency, osteoporosis, obesity, and tobacco use who is here for a follow-up evaluation for diabetes. Last seen by me in Feb 2025    The patient location is: in [place]  The chief complaint leading to consultation is: diabetes    Visit type: audiovisual    Face to Face time with patient: 25 minutes   40 minutes of total time spent on the encounter, which includes face to face time and non-face to face time preparing to see the patient (eg, review of tests), Obtaining and/or reviewing separately obtained history, Documenting clinical information in the electronic or other health record, Independently interpreting results (not separately reported) and communicating results to the patient/family/caregiver, or Care coordination (not separately reported).    Each patient to whom he or she provides medical services by telemedicine is:  (1) informed of the relationship between the physician and patient and the respective role of any other health care provider with respect to management of the patient; and (2) notified that he or she may decline to receive medical services by telemedicine and may withdraw from such care at any time.    Patient reported in 2007, had heart issues (atrial myxoma), surgery, issues with anesthesia. Then had pain, f/u showed lung issues, then surgery 2008. Later RA, on mtx, other meds. Including steroids.    States he has lost around 70 pounds since Jan 2022 -diet and addition of Mounjaro.  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    He was hospitalized in [place] -was told he had pancreatic enzyme problem and recently started on medication. Overall he is feeling better.     He is back on prednisone 10 mg daily at night. He would like us to manage weaning prednisone.   At  his last visit we increased tresiba.     With regards to the diabetes:  Diagnosed with T2DM since around 2215-5099. Had symptoms, sugar 300. Metformin initially, other pills, GLP1, then lately insulin.     Known complications:    Retinopathy- Denies    Nephropathy- No    Neuropathy- No    CAD? Yes, hx stents. Also HFpEF    CVA- TIA with myxoma, denies CVA in the past     Today, using inpen   He has stopped using omnipod-cost prohibitive.    Medications   Tresiba 14 units twice daily   Novolog ISF 25; I:C 1:8   6-12 units before meals     He would like to get back on INPEN     He is changing needle every time. He is rotating injection sites. He is giving novolog before a meal 45 -90 minutes     Missed doses-recently restarted     Other medications tried:   onglyza-caused HF   metformin. Wasn't helping as much   other meds   Trulicity: 4.5 mg/week. Tuesdays. Stopped in the hospital   Basal insulin: Tresiba 44 units daily   Prandial insulin: Novolog 14 units TIDWM plus scale.    Mounjaro -- constipation     #4 times a day testing  G7 Dexcom  Dexcom - blood sugars improving but higher after meals     Hypoglycemic event- denies and denies s/s of hypoglycemia   Knows how to correct with 15 grams of carbs- juice, coke, or a peppermint.     Dietary recall: He is working on diet.  States he is eating 1200 calorie diet daily  Trying to do low carb diet.   Breakfast: 7   Lunch: 12  Dinner: 7  Snacks: cheese and crackers or sardines   Drinks: caffeine in coffee spikes blood sugar and was drinking 3 pots in the past     Exercise - He is trying to incorporate exercise -walking and using rubber bands     Education - last visit: 6/2023    Has a Medic alert tag- has pendant for falls   Has been having  more pain when walking lately  Glucagon/Baqsimi- does not want    Diabetes Management Status  Statin: Not taking- did not tolerate  ACE/ARB: Taking    Lab Results   Component Value Date    HGBA1C 8.5 (H) 01/13/2025    HGBA1C 5.4  10/04/2024    HGBA1C 7.4 (H) 03/01/2024     Screening or Prevention Patient's value Goal Complete/Controlled?   HgA1C Testing and Control   Lab Results   Component Value Date    HGBA1C 8.5 (H) 01/13/2025      Annually/Less than 8% Yes   Lipid profile : 01/13/2025 Annually Yes   LDL control Lab Results   Component Value Date    LDLCALC 79.4 08/19/2024    Annually/Less than 100 mg/dl  No   Nephropathy screening Lab Results   Component Value Date    LABMICR 23.0 03/01/2024     Lab Results   Component Value Date    PROTEINUA Negative 08/20/2024    Annually Yes   Blood pressure BP Readings from Last 1 Encounters:   09/01/24 123/80    Less than 140/90 Yes   Dilated retinal exam : 01/30/2024 Annually Yes   Foot exam   Most Recent Foot Exam Date: Not Found Annually No     He is seeing hepatology      Latest Reference Range & Units 09/21/23 11:54 09/29/23 15:01   AST 10 - 40 U/L 25 39   ALT 10 - 44 U/L 51 (H) 89 (H)   (H): Data is abnormally high    With regards to osteoporosis,     Based on FRAX score     He is on ergo 50k weekly   He has calcium in diet; eating more cheese and dairy      Latest Reference Range & Units 09/29/23 15:01   Vitamin D 30 - 96 ng/mL 30     current medication: none   Today, reports he wants treatment     He had a fall in April 2024 -fractured T11, L1, L3 and had kyphoplasty.    Weight bearing exercise- Walking and also using rubber bands for strength training   Recent falls- stumbles and grabs himself once every 2 to 3 days; actual fall in Feb/March 2023    They have made fall precautions in house   Dental work- needs cleaning; no need for dental work     No recent fractures   No significant height loss (>2 inches)    tob use - he has stopped smoking since hospitalization   etoh use-denies      No current diarrhea or h/o malabsorption  + chronic exposure to steroid therapy,  - anticoagulants, proton pump inhibitors or antiseizure medications.     + GERD, indigestion,   Denies gastric bypass  surgery.     Denies history of thyroid disease, anemia, kidney stones or kidney disease.     Denies active malignancy, history of malignancy the involved the bone, prior radiation treatment, or unexplained elevations of alk phos on labs     Bone density 2/2025  Total Hip:  BMD:  0.739 g/cm2.  T-Score:  -2.1  Interpretation:   OSTEOPENIA.     Femoral Neck BMD:  0.773 g/cm2.  T-Score:  -1.9.  Interpretation:   OSTEOPENIA.     Total radius:  BMD: 0.606 g/cm2.  T-Score: -1.1 Interpretation:   OSTEOPENIA.     FRAX 10-year probability of fracture   Major Osteoporotic Fracture:  16.9%   Hip Fracture:  8.6%      Latest Reference Range & Units 09/29/23 15:01   Vit D, 25-Hydroxy 30 - 96 ng/mL 30     With regards to dyslipidemia,   Unable to tolerate statin- allergy  States he has tried several in the past and caused heart arrhythmia   Does not want to try praulent or repatha      Latest Reference Range & Units 12/01/23 08:05   Cholesterol Total 120 - 199 mg/dL 221 (H)   HDL 40 - 75 mg/dL 61   HDL/Cholesterol Ratio 20.0 - 50.0 % 27.6   Non-HDL Cholesterol mg/dL 160   Total Cholesterol/HDL Ratio 2.0 - 5.0  3.6   Triglycerides 30 - 150 mg/dL 157 (H)   LDL Cholesterol 63.0 - 159.0 mg/dL 128.6   (H): Data is abnormally high    With regards to elevated prolactin-now normal,    Now normal    Currently pt reports no galactorrhea  + diplopia that recently started around 2 months ago  - chronic headaches    No unexplained weight changes.  + fatigue  He does report some sleep problems. Has pulse ox- decreasing O2. States he has CPAP but unable to tolerate mask. States he has scarring on trachea from intubation in the past    Denies cold intolerance   No nausea or vomiting  + orthostatic symptoms     The patient is not currently using any medication known to cause hyperprolactinemia such as: antipsychotics (risperidone, phenothiazines, haloperidol and butyrophenones),gastric motility drugs (metoclopramide and domperidone), or  antihypertensives (methyldopa, reserpine, and verapamil).     Latest Reference Range & Units 06/21/22 07:23   Prolactin 3.5 - 19.4 ng/mL 28.8 (H)      Latest Reference Range & Units 06/21/22 07:23   FSH 0.95 - 11.95 mIU/mL 5.93   LH 0.6 - 12.1 mIU/mL 2.4   Prolactin 3.5 - 19.4 ng/mL 28.8 (H)   Sex Hormone Binding Globulin 22 - 77 nmol/L 79 (H)   Testosterone 250 - 1100 ng/dL 542   Testosterone, Bioavailable 110.0 - 575.0 ng/dL 63.7 (L)   Testosterone, Free 46.0 - 224.0 pg/mL 33.8 (L)      Latest Reference Range & Units 06/21/22 07:23 07/19/23 09:17   Prolactin 3.5 - 19.4 ng/mL 28.8 (H) 19.0   (H): Data is abnormally high    With regards to elevated LFTs and alk phos,     He has seen hepatology in the past and was discharged.     FIB-4 Calculation: 2.64 at 5/21/2024  3:03 PM     FIB-4 below 1.30 is considered as low-risk for advanced fibrosis  FIB-4 over 2.67 is considered as high-risk for advanced fibrosis  FIB-4 values between 1.30 and 2.67 are considered as intermediate-risk of advanced fibrosis for ages 36-64.     For ages > 64 the cut-off for low-risk goes to < 2.  This is a screening tool and clinical judgement should be used in the interpretation of these results.    Reviewed past medical, family, social history and updated as appropriate.     Review of Systems   Constitutional:  Positive for fatigue. Negative for unexpected weight change.   Eyes:  Negative for visual disturbance.   Endocrine: Positive for polyphagia. Negative for polydipsia and polyuria.        Hair loss   Musculoskeletal:  Positive for arthralgias, back pain and gait problem.     As above    Objective:     There were no vitals filed for this visit.    Prior vitals:  BP Readings from Last 5 Encounters:   09/01/24 123/80   08/20/24 115/73   08/08/24 (!) 140/86   07/29/24 124/75   07/25/24 101/71     Physical Exam  Neurological:      Mental Status: He is alert.       Wt Readings from Last 10 Encounters:   09/01/24 0145 70.3 kg (155 lb)    08/26/24 1344 70.3 kg (155 lb)   08/19/24 2256 83.5 kg (184 lb)   08/07/24 2157 83.5 kg (184 lb)   07/14/24 0430 83.6 kg (184 lb 4.9 oz)   07/07/24 0430 83.1 kg (183 lb 3.2 oz)   07/06/24 0430 83.3 kg (183 lb 10.3 oz)   07/05/24 0430 83.5 kg (184 lb 1.4 oz)   07/04/24 1201 81.5 kg (179 lb 10.8 oz)   07/02/24 1405 81.5 kg (179 lb 10.8 oz)   07/01/24 0430 81.5 kg (179 lb 10.8 oz)   06/28/24 2045 80.6 kg (177 lb 11.1 oz)   06/28/24 1512 97.5 kg (215 lb)   06/26/24 2221 99.8 kg (220 lb)   06/06/24 0134 99.8 kg (220 lb)   06/01/24 2351 99.8 kg (220 lb)   05/20/24 1129 92 kg (202 lb 13.2 oz)   04/30/24 0434 92.6 kg (204 lb 2.3 oz)   04/26/24 2241 91.3 kg (201 lb 4.5 oz)   04/26/24 1525 91.3 kg (201 lb 4.5 oz)     Lab Results   Component Value Date    HGBA1C 8.5 (H) 01/13/2025     Lab Results   Component Value Date    CHOL 141 08/19/2024    HDL 24 (L) 08/19/2024    LDLCALC 79.4 08/19/2024    TRIG 188 (H) 08/19/2024    CHOLHDL 17.0 (L) 08/19/2024     Lab Results   Component Value Date     (L) 08/19/2024    K 4.1 08/19/2024     08/19/2024    CO2 24 08/19/2024     (H) 08/19/2024    BUN 7 (L) 08/19/2024    CREATININE 0.6 08/19/2024    CALCIUM 7.6 (L) 08/19/2024    PROT 5.9 (L) 08/19/2024    ALBUMIN 1.5 (L) 08/19/2024    BILITOT 1.0 08/19/2024    ALKPHOS 450 (H) 08/19/2024    AST 45 (H) 08/19/2024    ALT 25 08/19/2024    ANIONGAP 8 08/19/2024    ESTGFRAFRICA >60 07/21/2022    EGFRNONAA >60 07/21/2022    TSH 3.731 08/19/2024      Lab Results   Component Value Date    MICALBCREAT 19.0 03/01/2024     Assessment/Plan:     1. Type 2 diabetes mellitus with other circulatory complication, with long-term current use of insulin  insulin pen,reusable,BT,aspart (INPEN, NOVOLOG OR FIASP, BLUE) InPn    insulin degludec (TRESIBA FLEXTOUCH U-200) 200 unit/mL (3 mL) insulin pen    Lipid Panel    Comprehensive Metabolic Panel      2. Vitamin D deficiency  Vitamin D      3. Hyperlipidemia, unspecified hyperlipidemia type   Lipid Panel    Comprehensive Metabolic Panel      4. Essential hypertension        5. Osteoporosis, unspecified osteoporosis type, unspecified pathological fracture presence        6. Class 1 obesity due to excess calories with serious comorbidity and body mass index (BMI) of 34.0 to 34.9 in adult        7. Elevated alkaline phosphatase level  GAMMA GT    Alkaline phosphatase, bone specific      8. Rheumatoid arthritis flare  predniSONE (DELTASONE) 2.5 MG tablet        Type 2 diabetes mellitus, with long-term current use of insulin  Reviewed goals of therapy - to get the best control we can without hypoglycemia. Goal <7.5   - last a1c at goal    A1c rising   Check A1c  Back on steroids   Has been on Trulicity and Mounjaro in the past. Has had some GI issues. Would like to avoid at this time     He would like to get back on INPEN Will call in     Dexcom reviewed: blood sugars improving but higher after dinner   He is not giving insulin with snack.     Medication Changes   Increase Tresiba to 16 units twice daily   Continue Novolog I:C ratio 1:8 and change sliding scale to below   With using correction scale:  MDD of:   150-200: +2 units  201-250: +4 units  251-300: +6 units  301-350: +8 units  >350: +10 units  Add Novolog 4 units with snack.  If your blood sugar drops <70 then decrease novolog to 2 units before snacks.   If you are skipping a meal at what would be meal time, give sliding scale only   Try to give novolog no sooner than every 4 hours  He is interested in inpen while waiting on omnipod.      -Advised frequent self blood glucose monitoring. Patient encouraged to document glucose results and bring them to every clinic visit    -Hypoglycemia precautions discussed. Instructed on precautions before driving.    -Close adherence to lifestyle changes recommended.    -Periodic follow ups for eye evaluations, foot care suggested.  Not on statin due to allergy - consider PCSK9 in future    Vitamin D  deficiency  Continue Vitamin D        Hyperlipidemia  Allergy to statin  Not interested in trying PCSK9    Essential hypertension  bp controlled  continue regimen  F/u with PCP, monitor      Osteoporosis  Based on FRAX score and now based on fracture after fall    Avoid falls. He stopped smoking. Encouraged exercise per PT. Continue vitamin D and calcium in diet. Has significant reflux - would avoid PO bisphosphonate's     Reclast -having significant RA pains at this time Will avoid for now. Prolia -he would like to avoid due to increased risk of infection due to steroids  Having hypocalcemia and weight loss.     He is now interested in treatment. I recommend anabolic.   Will check secondary causes first    Consider forteo or tymlos    Class 1 obesity due to excess calories with serious comorbidity and body mass index (BMI) of 34.0 to 34.9 in adult  Losing weight     Visit today included increased complexity associated with the care of episodic problems type 2 diabetes, hyperlipidemia, and osteoporosis addressed and managing longitudinal care of the patient due to serious managed problems type 2 diabetes, hyperlipidemia, and osteoporosis     Agrees to look at AVS    Follow up in about 4 months (around 7/11/2025).

## 2025-03-18 RX ORDER — DIGOXIN 125 MCG
0.12 TABLET ORAL DAILY
Qty: 90 TABLET | Refills: 3 | Status: SHIPPED | OUTPATIENT
Start: 2025-03-18

## 2025-03-23 ENCOUNTER — PATIENT MESSAGE (OUTPATIENT)
Dept: ENDOCRINOLOGY | Facility: CLINIC | Age: 70
End: 2025-03-23
Payer: MEDICARE

## 2025-03-24 DIAGNOSIS — Z79.4 TYPE 2 DIABETES MELLITUS WITH OTHER CIRCULATORY COMPLICATION, WITH LONG-TERM CURRENT USE OF INSULIN: Primary | ICD-10-CM

## 2025-03-24 DIAGNOSIS — E11.59 TYPE 2 DIABETES MELLITUS WITH OTHER CIRCULATORY COMPLICATION, WITH LONG-TERM CURRENT USE OF INSULIN: Primary | ICD-10-CM

## 2025-03-24 RX ORDER — INSULIN DEGLUDEC 200 U/ML
16 INJECTION, SOLUTION SUBCUTANEOUS 2 TIMES DAILY
Qty: 4.8 ML | Refills: 11 | Status: SHIPPED | OUTPATIENT
Start: 2025-03-24 | End: 2026-03-24

## 2025-03-24 RX ORDER — INSULIN ASPART 100 [IU]/ML
10 INJECTION, SOLUTION INTRAVENOUS; SUBCUTANEOUS
Qty: 9 ML | Refills: 11 | Status: SHIPPED | OUTPATIENT
Start: 2025-03-24 | End: 2026-03-24

## 2025-04-01 ENCOUNTER — PATIENT MESSAGE (OUTPATIENT)
Dept: ENDOCRINOLOGY | Facility: CLINIC | Age: 70
End: 2025-04-01
Payer: MEDICARE

## 2025-04-07 ENCOUNTER — PATIENT MESSAGE (OUTPATIENT)
Dept: ENDOCRINOLOGY | Facility: CLINIC | Age: 70
End: 2025-04-07
Payer: MEDICARE

## 2025-04-07 DIAGNOSIS — E55.9 VITAMIN D DEFICIENCY: ICD-10-CM

## 2025-04-08 RX ORDER — FOLIC ACID 1 MG/1
1 TABLET ORAL DAILY
Qty: 30 TABLET | Refills: 5 | Status: SHIPPED | OUTPATIENT
Start: 2025-04-08 | End: 2025-10-05

## 2025-04-30 ENCOUNTER — PATIENT MESSAGE (OUTPATIENT)
Dept: ENDOCRINOLOGY | Facility: CLINIC | Age: 70
End: 2025-04-30
Payer: MEDICARE

## 2025-05-14 ENCOUNTER — PATIENT MESSAGE (OUTPATIENT)
Dept: ENDOCRINOLOGY | Facility: CLINIC | Age: 70
End: 2025-05-14
Payer: MEDICARE

## 2025-05-15 ENCOUNTER — PATIENT MESSAGE (OUTPATIENT)
Dept: ENDOCRINOLOGY | Facility: CLINIC | Age: 70
End: 2025-05-15
Payer: MEDICARE

## 2025-05-15 DIAGNOSIS — Z79.4 TYPE 2 DIABETES MELLITUS WITH OTHER CIRCULATORY COMPLICATION, WITH LONG-TERM CURRENT USE OF INSULIN: ICD-10-CM

## 2025-05-15 DIAGNOSIS — E11.59 TYPE 2 DIABETES MELLITUS WITH OTHER CIRCULATORY COMPLICATION, WITH LONG-TERM CURRENT USE OF INSULIN: ICD-10-CM

## 2025-05-15 RX ORDER — INSULIN DEGLUDEC 200 U/ML
20 INJECTION, SOLUTION SUBCUTANEOUS 2 TIMES DAILY
Qty: 6 ML | Refills: 11 | Status: SHIPPED | OUTPATIENT
Start: 2025-05-15 | End: 2026-05-15

## 2025-05-15 RX ORDER — INSULIN DEGLUDEC 200 U/ML
16 INJECTION, SOLUTION SUBCUTANEOUS 2 TIMES DAILY
Qty: 4.8 ML | Refills: 11 | Status: SHIPPED | OUTPATIENT
Start: 2025-05-15 | End: 2025-05-15 | Stop reason: SDUPTHER

## 2025-05-19 DIAGNOSIS — Z79.4 TYPE 2 DIABETES MELLITUS WITH HYPERGLYCEMIA, WITH LONG-TERM CURRENT USE OF INSULIN: ICD-10-CM

## 2025-05-19 DIAGNOSIS — E11.65 TYPE 2 DIABETES MELLITUS WITH HYPERGLYCEMIA, WITH LONG-TERM CURRENT USE OF INSULIN: ICD-10-CM

## 2025-05-19 RX ORDER — INSULIN ADMIN. SUPPLIES
INSULIN PEN (EA) SUBCUTANEOUS
Qty: 1 EACH | Refills: 0 | Status: SHIPPED | OUTPATIENT
Start: 2025-05-19

## 2025-05-20 DIAGNOSIS — R74.8 ELEVATED ALKALINE PHOSPHATASE LEVEL: ICD-10-CM

## 2025-05-20 DIAGNOSIS — Z79.4 TYPE 2 DIABETES MELLITUS WITH HYPERGLYCEMIA, WITH LONG-TERM CURRENT USE OF INSULIN: ICD-10-CM

## 2025-05-20 DIAGNOSIS — E78.5 HYPERLIPIDEMIA, UNSPECIFIED HYPERLIPIDEMIA TYPE: ICD-10-CM

## 2025-05-20 DIAGNOSIS — E11.59 TYPE 2 DIABETES MELLITUS WITH OTHER CIRCULATORY COMPLICATION, WITH LONG-TERM CURRENT USE OF INSULIN: ICD-10-CM

## 2025-05-20 DIAGNOSIS — E55.9 VITAMIN D DEFICIENCY: ICD-10-CM

## 2025-05-20 DIAGNOSIS — E11.65 TYPE 2 DIABETES MELLITUS WITH HYPERGLYCEMIA, WITH LONG-TERM CURRENT USE OF INSULIN: ICD-10-CM

## 2025-05-20 DIAGNOSIS — Z79.4 TYPE 2 DIABETES MELLITUS WITH OTHER CIRCULATORY COMPLICATION, WITH LONG-TERM CURRENT USE OF INSULIN: ICD-10-CM

## 2025-05-22 DIAGNOSIS — E11.65 TYPE 2 DIABETES MELLITUS WITH HYPERGLYCEMIA, WITH LONG-TERM CURRENT USE OF INSULIN: Primary | ICD-10-CM

## 2025-05-22 DIAGNOSIS — Z79.4 TYPE 2 DIABETES MELLITUS WITH HYPERGLYCEMIA, WITH LONG-TERM CURRENT USE OF INSULIN: Primary | ICD-10-CM

## 2025-06-03 ENCOUNTER — PATIENT MESSAGE (OUTPATIENT)
Dept: ENDOCRINOLOGY | Facility: CLINIC | Age: 70
End: 2025-06-03
Payer: MEDICARE

## 2025-06-05 ENCOUNTER — PATIENT MESSAGE (OUTPATIENT)
Dept: ENDOCRINOLOGY | Facility: CLINIC | Age: 70
End: 2025-06-05
Payer: MEDICARE

## 2025-06-05 DIAGNOSIS — Z79.4 TYPE 2 DIABETES MELLITUS WITH HYPERGLYCEMIA, WITH LONG-TERM CURRENT USE OF INSULIN: Primary | ICD-10-CM

## 2025-06-05 DIAGNOSIS — E11.65 TYPE 2 DIABETES MELLITUS WITH HYPERGLYCEMIA, WITH LONG-TERM CURRENT USE OF INSULIN: Primary | ICD-10-CM

## 2025-06-05 RX ORDER — BLOOD-GLUCOSE SENSOR
EACH MISCELLANEOUS
Qty: 9 EACH | Refills: 3 | Status: SHIPPED | OUTPATIENT
Start: 2025-06-05

## 2025-06-09 ENCOUNTER — PATIENT MESSAGE (OUTPATIENT)
Dept: ENDOCRINOLOGY | Facility: CLINIC | Age: 70
End: 2025-06-09
Payer: MEDICARE

## 2025-06-09 NOTE — PROGRESS NOTES
Subjective:      Chief Complaint: No chief complaint on file.    HPI: Nithin Wagner is a 69 y.o. male with diabetes, dyslipidemia, hypertension, diastolic heart failure, PVD, RA, vitamin D deficiency, osteoporosis, obesity, and tobacco use who is here for a follow-up evaluation for diabetes. Last seen by me in March 2025    The patient location is: in [place]  The chief complaint leading to consultation is: diabetes    Visit type: audiovisual    Face to Face time with patient: 34 minutes   45 minutes of total time spent on the encounter, which includes face to face time and non-face to face time preparing to see the patient (eg, review of tests), Obtaining and/or reviewing separately obtained history, Documenting clinical information in the electronic or other health record, Independently interpreting results (not separately reported) and communicating results to the patient/family/caregiver, or Care coordination (not separately reported).    Each patient to whom he or she provides medical services by telemedicine is:  (1) informed of the relationship between the physician and patient and the respective role of any other health care provider with respect to management of the patient; and (2) notified that he or she may decline to receive medical services by telemedicine and may withdraw from such care at any time.    Patient reported in 2007, had heart issues (atrial myxoma), surgery, issues with anesthesia. Then had pain, f/u showed lung issues, then surgery 2008. Later RA, on mtx, other meds. Including steroids.    States he has lost around 70 pounds since Jan 2022 -diet and addition of Mounjaro.  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    He was hospitalized in [place] -was told he had pancreatic enzyme problem and recently started on medication -Zenpep.     Since his last visit he was having right knee pain - was told he had fluid around knee. He increased his prednisone at  this time. He reports he is now on prednisone 7 mg twice daily. He will be seeing rheumatology this week who wants to start biologic.    With regards to the diabetes:  Diagnosed with T2DM since around 7801-5516. Had symptoms, sugar 300. Metformin initially, other pills, GLP1, then lately insulin.     Known complications:    Retinopathy- Denies; has upcoming appointment; needs cataract in left eye    Nephropathy- No    Neuropathy- No    CAD? Yes, hx stents. Also HFpEF    CVA- TIA with myxoma, denies CVA in the past     Today, using inpen   He has stopped using omnipod-cost prohibitive.    Medications   Tresiba 16 units twice daily   Novolog ISF 25; I:C 1:8   8-12 units before meals     He is on INPEN     He is changing needle every time. He is rotating injection sites. He is giving novolog before a meal 45 -90 minutes.     Missed doses- denies     Other medications tried:   onglyza-caused HF   metformin. Wasn't helping as much   other meds   Trulicity: 4.5 mg/week. Tuesdays. Stopped in the hospital   Basal insulin: Tresiba 44 units daily   Prandial insulin: Novolog 14 units TIDWM plus scale.    Mounjaro -- constipation     #4 times a day testing  G7 Dexcom  Dexcom - blood sugars are above goal- fasting and PP  He is giving long acting insulin at 10 and 10  GMI 7.6%; average 180 ; 51% in range; 46% high; 3% very high; 0% low; 0% very low;     Hypoglycemic event- prior to increasing steroids as above   Knows how to correct with 15 grams of carbs- juice, coke, or a peppermint.     Dietary recall: He is working on diet. He is eating 3 meals daily and having 2 snacks. Reports he is often eating prior to bed to ensure he does not have hypoglycemia.   States he is eating 1200 calorie diet daily  Trying to do low carb diet.   Breakfast: 7   Lunch: 12  Dinner: 7  Snacks: cheese and crackers or sardines   Drinks: caffeine in coffee spikes blood sugar and was drinking 3 pots in the past     Exercise - He is trying to  incorporate exercise -walking and using rubber bands. This is how he hurt his knee as above. Has been doing more PT.   He is seeing pain management who wants to do ablation.      Education - last visit: 7/2024    Has a Medic alert tag- has pendant for falls   Has been having  more pain when walking lately  Glucagon/Baqsimi- does not want    Diabetes Management Status  Statin: Not taking- did not tolerate  ACE/ARB: Taking    Lab Results   Component Value Date    HGBA1C 8.9 (H) 06/10/2025    HGBA1C 8.5 (H) 01/13/2025    HGBA1C 5.4 10/04/2024     Screening or Prevention Patient's value Goal Complete/Controlled?   HgA1C Testing and Control   Lab Results   Component Value Date    HGBA1C 8.9 (H) 06/10/2025      Annually/Less than 8% Yes   Lipid profile : 06/10/2025 Annually Yes   LDL control Lab Results   Component Value Date    LDLCALC 70 06/10/2025    Annually/Less than 100 mg/dl  No   Nephropathy screening Lab Results   Component Value Date    LABMICR 23.0 03/01/2024     Lab Results   Component Value Date    PROTEINUA Negative 08/20/2024    Annually Yes   Blood pressure BP Readings from Last 1 Encounters:   09/01/24 123/80    Less than 140/90 Yes   Dilated retinal exam : 01/30/2024 Annually Yes   Foot exam   Most Recent Foot Exam Date: Not Found Annually No     He was seeing hepatology but discharged      Latest Reference Range & Units 09/21/23 11:54 09/29/23 15:01   AST 10 - 40 U/L 25 39   ALT 10 - 44 U/L 51 (H) 89 (H)   (H): Data is abnormally high    With regards to osteoporosis,     Based on FRAX score     He is on ergo 50k weekly   He has calcium in diet; eating more cheese and dairy   Denies falls since last visit      Latest Reference Range & Units 09/29/23 15:01   Vitamin D 30 - 96 ng/mL 30     current medication: none   Today, reports he wants treatment     He had a fall in April 2024 -fractured T11, L1, L3 and had kyphoplasty.    Weight bearing exercise- Walking and also using rubber bands for strength  training   Recent falls- stumbles and grabs himself once every 2 to 3 days; actual fall in Feb/March 2023    They have made fall precautions in house   Dental work- needs cleaning; no need for dental work     No recent fractures   No significant height loss (>2 inches)    tob use - he has stopped smoking since hospitalization   etoh use-denies      No current diarrhea or h/o malabsorption  + chronic exposure to steroid therapy,  - anticoagulants, proton pump inhibitors or antiseizure medications.     + GERD, indigestion,   Denies gastric bypass surgery.     Denies history of thyroid disease, anemia, kidney stones or kidney disease.     Denies active malignancy, history of malignancy the involved the bone, prior radiation treatment, or unexplained elevations of alk phos on labs     Reports stent placed last year and has had TIAs in the past    Bone density 2/2025  Total Hip:  BMD:  0.739 g/cm2.  T-Score:  -2.1  Interpretation:   OSTEOPENIA.     Femoral Neck BMD:  0.773 g/cm2.  T-Score:  -1.9.  Interpretation:   OSTEOPENIA.     Total radius:  BMD: 0.606 g/cm2.  T-Score: -1.1 Interpretation:   OSTEOPENIA.     FRAX 10-year probability of fracture   Major Osteoporotic Fracture:  16.9%   Hip Fracture:  8.6%      Latest Reference Range & Units 09/29/23 15:01   Vit D, 25-Hydroxy 30 - 96 ng/mL 30     With regards to dyslipidemia,   Unable to tolerate statin- allergy  States he has tried several in the past and caused heart arrhythmia   Does not want to try praulent or repatha      Latest Reference Range & Units 12/01/23 08:05   Cholesterol Total 120 - 199 mg/dL 221 (H)   HDL 40 - 75 mg/dL 61   HDL/Cholesterol Ratio 20.0 - 50.0 % 27.6   Non-HDL Cholesterol mg/dL 160   Total Cholesterol/HDL Ratio 2.0 - 5.0  3.6   Triglycerides 30 - 150 mg/dL 157 (H)   LDL Cholesterol 63.0 - 159.0 mg/dL 128.6   (H): Data is abnormally high    With regards to elevated prolactin-now normal,    Now normal    Currently pt reports no galactorrhea  +  diplopia that recently started around 2 months ago  - chronic headaches    No unexplained weight changes.  + fatigue  He does report some sleep problems. Has pulse ox- decreasing O2. States he has CPAP but unable to tolerate mask. States he has scarring on trachea from intubation in the past    Denies cold intolerance   No nausea or vomiting  + orthostatic symptoms     The patient is not currently using any medication known to cause hyperprolactinemia such as: antipsychotics (risperidone, phenothiazines, haloperidol and butyrophenones),gastric motility drugs (metoclopramide and domperidone), or antihypertensives (methyldopa, reserpine, and verapamil).     Latest Reference Range & Units 06/21/22 07:23   Prolactin 3.5 - 19.4 ng/mL 28.8 (H)      Latest Reference Range & Units 06/21/22 07:23   FSH 0.95 - 11.95 mIU/mL 5.93   LH 0.6 - 12.1 mIU/mL 2.4   Prolactin 3.5 - 19.4 ng/mL 28.8 (H)   Sex Hormone Binding Globulin 22 - 77 nmol/L 79 (H)   Testosterone 250 - 1100 ng/dL 542   Testosterone, Bioavailable 110.0 - 575.0 ng/dL 63.7 (L)   Testosterone, Free 46.0 - 224.0 pg/mL 33.8 (L)      Latest Reference Range & Units 06/21/22 07:23 07/19/23 09:17   Prolactin 3.5 - 19.4 ng/mL 28.8 (H) 19.0   (H): Data is abnormally high    With regards to elevated LFTs and alk phos,     He has seen hepatology in the past and was discharged.     FIB-4 Calculation: 2.64 at 5/21/2024  3:03 PM     FIB-4 below 1.30 is considered as low-risk for advanced fibrosis  FIB-4 over 2.67 is considered as high-risk for advanced fibrosis  FIB-4 values between 1.30 and 2.67 are considered as intermediate-risk of advanced fibrosis for ages 36-64.     For ages > 64 the cut-off for low-risk goes to < 2.  This is a screening tool and clinical judgement should be used in the interpretation of these results.    Reviewed past medical, family, social history and updated as appropriate.     Review of Systems   Constitutional:  Negative for fatigue and unexpected  weight change.   Eyes:  Negative for visual disturbance.   Endocrine: Positive for polyphagia. Negative for polydipsia and polyuria.        Hair loss   Musculoskeletal:  Positive for arthralgias, back pain and gait problem.     As above    Objective:     There were no vitals filed for this visit.    Prior vitals:  BP Readings from Last 5 Encounters:   09/01/24 123/80   08/20/24 115/73   08/08/24 (!) 140/86   07/29/24 124/75   07/25/24 101/71     Physical Exam  Neurological:      Mental Status: He is alert.       Wt Readings from Last 10 Encounters:   09/01/24 0145 70.3 kg (155 lb)   08/26/24 1344 70.3 kg (155 lb)   08/19/24 2256 83.5 kg (184 lb)   08/07/24 2157 83.5 kg (184 lb)   07/14/24 0430 83.6 kg (184 lb 4.9 oz)   07/07/24 0430 83.1 kg (183 lb 3.2 oz)   07/06/24 0430 83.3 kg (183 lb 10.3 oz)   07/05/24 0430 83.5 kg (184 lb 1.4 oz)   07/04/24 1201 81.5 kg (179 lb 10.8 oz)   07/02/24 1405 81.5 kg (179 lb 10.8 oz)   07/01/24 0430 81.5 kg (179 lb 10.8 oz)   06/28/24 2045 80.6 kg (177 lb 11.1 oz)   06/28/24 1512 97.5 kg (215 lb)   06/26/24 2221 99.8 kg (220 lb)   06/06/24 0134 99.8 kg (220 lb)   06/01/24 2351 99.8 kg (220 lb)   05/20/24 1129 92 kg (202 lb 13.2 oz)   04/30/24 0434 92.6 kg (204 lb 2.3 oz)   04/26/24 2241 91.3 kg (201 lb 4.5 oz)   04/26/24 1525 91.3 kg (201 lb 4.5 oz)     Lab Results   Component Value Date    HGBA1C 8.9 (H) 06/10/2025     Lab Results   Component Value Date    CHOL 128 06/10/2025    HDL 38 (L) 06/10/2025    LDLCALC 70 06/10/2025    TRIG 106 06/10/2025    CHOLHDL 17.0 (L) 08/19/2024     Lab Results   Component Value Date     06/10/2025    K 4.4 06/10/2025    CL 99 06/10/2025    CO2 22 06/10/2025     (H) 06/10/2025    BUN 14 06/10/2025    CREATININE 0.74 (L) 06/10/2025    CALCIUM 9.2 06/10/2025    PROT 5.9 (L) 08/19/2024    ALBUMIN 3.7 (L) 06/10/2025    BILITOT 0.3 06/10/2025    ALKPHOS 450 (H) 08/19/2024    AST 16 06/10/2025    ALT 18 06/10/2025    ANIONGAP 8 08/19/2024     ESTGFRAFRICA >60 07/21/2022    EGFRNONAA >60 07/21/2022    TSH 2.610 06/10/2025      Lab Results   Component Value Date    DAOLBCVALERIANO 19.0 03/01/2024     Assessment/Plan:     1. Type 2 diabetes mellitus with other circulatory complication, with long-term current use of insulin  Comprehensive Metabolic Panel    C-Peptide    Glutamic Acid Decarboxylase    C-Peptide    Glutamic Acid Decarboxylase    CANCELED: C-Peptide    CANCELED: Glutamic Acid Decarboxylase      2. Class 1 obesity due to excess calories with serious comorbidity and body mass index (BMI) of 34.0 to 34.9 in adult        3. Osteoporosis, unspecified osteoporosis type, unspecified pathological fracture presence  Renal Function Panel    Protein Electrophoresis, Serum    Tissue Transglutaminase, IgA    Renal Function Panel    Protein Electrophoresis, Serum    Tissue Transglutaminase, IgA    PTH, Intact    PTH, Intact    CANCELED: PTH, Intact    CANCELED: Tissue Transglutaminase, IgA    CANCELED: Protein Electrophoresis, Serum    CANCELED: Renal Function Panel      4. Vitamin D deficiency        5. Hyperlipidemia, unspecified hyperlipidemia type        6. Essential hypertension          Type 2 diabetes mellitus, with long-term current use of insulin  Reviewed goals of therapy - to get the best control we can without hypoglycemia. Goal <7.5  A1c rising but GMI improving  Back on steroids . Feels he needs more steroids. Increase prednisone to 8 mg twice daily     Has been on Trulicity and Mounjaro in the past. Has had some GI issues. 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 If your blood sugar drops <70 then decrease novolog to 2 units before snacks.   If you are skipping a meal at what would be meal time, give sliding scale only   Try to give novolog no sooner than every 4 hours     -Advised frequent self blood glucose monitoring. Patient encouraged to document glucose results and bring them to every clinic visit    -Hypoglycemia precautions discussed. Instructed on precautions before driving.    -Close adherence to lifestyle changes recommended.    -Periodic follow ups for eye evaluations, foot care suggested.  Not on statin due to allergy - consider PCSK9 in future    Class 1 obesity due to excess calories with serious comorbidity and body mass index (BMI) of 34.0 to 34.9 in adult  Losing weight     Osteoporosis  Based on FRAX score and now based on fracture after fall    Avoid falls. He stopped smoking. Encouraged exercise per PT. Continue vitamin D and calcium in diet. Has significant reflux - would avoid PO bisphosphonate's     He is now interested in treatment. I recommend anabolic.   Will check secondary causes first as not done with most recent labs  Will have him do labs   Avoid Prolia due to increased risk for infection and steroids  Avoid Evenity due to recent TIAs and stent   Avoid Reclast due to pain from RA at this time   Would prefer PTH analog     Having hypocalcemia and weight loss.     He is using cane instead of walker.  Consider forteo or tymlos    Vitamin D deficiency  Continue Vitamin D        Hyperlipidemia  LDL at goal   Allergy to statin  Not interested in trying PCSK9    Essential hypertension  bp controlled  continue regimen  F/u with PCP, monitor    Visit today included increased complexity associated with the care of episodic problems type 2 diabetes,  hyperlipidemia, and osteoporosis addressed and managing longitudinal care of the patient due to serious managed problems type 2 diabetes, hyperlipidemia, and osteoporosis     Agrees to look at AVS    Follow up in about 4 months (around 10/12/2025).

## 2025-06-10 ENCOUNTER — PATIENT MESSAGE (OUTPATIENT)
Dept: ENDOCRINOLOGY | Facility: CLINIC | Age: 70
End: 2025-06-10
Payer: MEDICARE

## 2025-06-11 ENCOUNTER — RESULTS FOLLOW-UP (OUTPATIENT)
Dept: ENDOCRINOLOGY | Facility: CLINIC | Age: 70
End: 2025-06-11

## 2025-06-12 ENCOUNTER — OFFICE VISIT (OUTPATIENT)
Dept: ENDOCRINOLOGY | Facility: CLINIC | Age: 70
End: 2025-06-12
Payer: MEDICARE

## 2025-06-12 DIAGNOSIS — E78.5 HYPERLIPIDEMIA, UNSPECIFIED HYPERLIPIDEMIA TYPE: ICD-10-CM

## 2025-06-12 DIAGNOSIS — I10 ESSENTIAL HYPERTENSION: ICD-10-CM

## 2025-06-12 DIAGNOSIS — E11.59 TYPE 2 DIABETES MELLITUS WITH OTHER CIRCULATORY COMPLICATION, WITH LONG-TERM CURRENT USE OF INSULIN: Primary | ICD-10-CM

## 2025-06-12 DIAGNOSIS — E55.9 VITAMIN D DEFICIENCY: ICD-10-CM

## 2025-06-12 DIAGNOSIS — E66.09 CLASS 1 OBESITY DUE TO EXCESS CALORIES WITH SERIOUS COMORBIDITY AND BODY MASS INDEX (BMI) OF 34.0 TO 34.9 IN ADULT: ICD-10-CM

## 2025-06-12 DIAGNOSIS — Z79.4 TYPE 2 DIABETES MELLITUS WITH OTHER CIRCULATORY COMPLICATION, WITH LONG-TERM CURRENT USE OF INSULIN: Primary | ICD-10-CM

## 2025-06-12 DIAGNOSIS — E66.811 CLASS 1 OBESITY DUE TO EXCESS CALORIES WITH SERIOUS COMORBIDITY AND BODY MASS INDEX (BMI) OF 34.0 TO 34.9 IN ADULT: ICD-10-CM

## 2025-06-12 DIAGNOSIS — M81.0 OSTEOPOROSIS, UNSPECIFIED OSTEOPOROSIS TYPE, UNSPECIFIED PATHOLOGICAL FRACTURE PRESENCE: ICD-10-CM

## 2025-06-12 NOTE — ASSESSMENT & PLAN NOTE
Based on FRAX score and now based on fracture after fall    Avoid falls. He stopped smoking. Encouraged exercise per PT. Continue vitamin D and calcium in diet. Has significant reflux - would avoid PO bisphosphonate's     He is now interested in treatment. I recommend anabolic.   Will check secondary causes first as not done with most recent labs  Will have him do labs   Avoid Prolia due to increased risk for infection and steroids  Avoid Evenity due to recent TIAs and stent   Avoid Reclast due to pain from RA at this time   Would prefer PTH analog     Having hypocalcemia and weight loss.     He is using cane instead of walker.  Consider forteo or tymlos

## 2025-06-12 NOTE — PATIENT INSTRUCTIONS
RFP   PTH   JEET   Cpeptide  TTG  SPEP    Diabetes   A1c rising but GMI improving  Back on steroids . Feels he needs more steroids. Increase prednisone to 8 mg twice daily     Has been on Trulicity and Mounjaro in the past. Has had some GI issues. 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 If your blood sugar drops <70 then decrease novolog to 2 units before snacks.   If you are skipping a meal at what would be meal time, give sliding scale only   Try to give novolog no sooner than every 4 hours     Osteoporosis   He is now interested in treatment. I recommend anabolic.   Will check secondary causes first as not done with most recent labs  Will have him do labs   Avoid Prolia due to increased risk for infection and steroids  Avoid Evenity due to recent TIAs and stent   Avoid Reclast due to pain from RA at this time   Would prefer PTH analog      Tscore- your bones compared to someone at age 29  -1.0 osteopenia  -2.5 osteoporosis  -3.0 is severe osteoporosis     FRAX score - risk of breaking bones in next 10 years  >20% major bone in body   >3% hip    Recommended daily allowance of calcium is 1475-1103 mg daily, preferably from food. See below  Recommended daily allowance of vitamin D is 400-1000 IU daily    Encouraged weight bearing exercise  Encouraged to avoid falls  Avoid alcohol and tobacco    Estimated Calcium Content of  Foods:  Produce  Serving Size Estimated Calcium*    Rebeca greens, frozen 8 oz 360 mg   Broccoli gabbie 8 oz 200 mg   Kale, frozen 8 oz 180 mg   Soy Beans, green, boiled 8 oz 175 mg   Bok Nu, cooked, boiled 8 oz 160 mg   Figs, dried 2 figs 65 mg   Broccoli, fresh, cooked 8 oz 60 mg   Oranges 1 whole 55 mg   Seafood Serving Size Estimated Calcium*    Sardines, canned with bones 3 oz 325 mg   Dandridge, canned with bones 3 oz 180 mg   Shrimp, canned 3 oz 125 mg   Dairy Serving Size Estimated Calcium*    Ricotta, part-skim 4 oz 335 mg   Yogurt, plain, low-fat 6 oz 310 mg   Milk, skim, low-fat, whole 8 oz 300 mg   Yogurt with fruit, low-fat 6 oz 260 mg   Mozzarella, part-skim 1 oz 210 mg   Cheddar 1 oz 205 mg   Yogurt, Greek 6 oz 200 mg   American Cheese 1 oz 195 mg   Feta Cheese 4 oz 140 mg   Cottage Cheese, 2% 4 oz 105 mg   Frozen yogurt, vanilla 8 oz 105 mg   Ice Cream, vanilla 8 oz 85 mg   Parmesan 1 tbsp 55 mg   Fortified Food Serving Size Estimated Calcium*   [place] milk, rice milk or soy milk, fortified 8 oz 300 mg   Orange juice and other fruit juices, fortified 8 oz 300 mg   Tofu, prepared with calcium 4 oz 205 mg   Waffle, frozen, fortified 2 pieces 200 mg   Oatmeal, fortified 1 packet 140 mg   English muffin, fortified 1 muffin 100 mg   Cereal, fortified 8 oz 100-1,000 mg   Other Serving Size Estimated Calcium*   Mac & cheese, frozen 1 package 325 mg   Pizza, cheese, frozen 1 serving 115 mg   Pudding, chocolate, prepared with 2% milk 4 oz 160 mg   Beans, baked, canned 4 oz 160 mg   *The calcium content listed for most foods is estimated and can vary due to multiple factors. Check the food label to determine how much calcium is in a particular product.  If you read the nutrition label for a food source, it lists the % calcium in that food.  For an 8 oz glass of milk, for example, the label states calcium 30%.  This is equivalent to 300 mg of calcium (multiply the listed number by 10).   **Table from the National  Osteoporosis Foundation    Osteoporosis medications  These are the medications we discussed for osteoporosis treatment:    - Bisphosphonates:         - these slow down bone loss         - oral forms (Fosamax and Actonel are examples) - taken once weekly or once monthly. SE: esophageal burning   Take typically for 5-10 years          - IV form (Reclast) - given intravenously once yearly for 3 years typically     SE: bone aches and joint pains and flu like symptoms for a couple of days     Please call [phone] to schedule infusion     Reclast: There are a few things I ask my patients to do before receiving reclast infusion.   1) The day before and day of the infusion please drink plenty of fluids.   2) The day of the infusion take over the counter tylenol one dose every six to eight hours for one day. This minimizes the joint pains that can occur with reclast.  3) Take an extra dose of over the counter vitamin D the day before the infusion.    - Prolia (denosumab):          - slows down bone loss           - it is a subcutaneous injection (under the skin) every 6 months, given in the office    Please call [phone] to schedule infusion     Side effects of Prolia reviewed, including risk of hypocalcemia, eczema/skin complications and possible increased risk of infections.  Also reviewed association with ONJ and rare atypical femur fractures.  Advise to see the dentist regularly, notify dentist of using this medication and avoid non-emergent dental implants and extractions.  Also advise to contact us if develops new, persistent thigh or groin pain.    Discussed ongoing concern and accumulating data describing rebound-associated vertebral fractures (RAVFs) after denosumab discontinuation.  We now know that discontinuation of denosumab is associated with up to 50% reduction in BMD that is only partially blocked by Reclast.  Current recommendations are to either continue indefinitely or to switch to oral bisphosphonate for  "at least a year.    ANABOLIC's BELOW: BUILD UP BONE    - Forteo (teriparatide) or Tymlos (abaloparatide):           - medications that "build bone" or increases bone formation           - subcutaneous injection (under the skin) taken once daily that you self-administer at home for 18-24 months    SE: headaches; dizziness; bone pains  TAKE AT NIGHT    -Evenity (romosozumab)   -medications that "build bone" or increases bone formation   - subcutaneous injection (under the skin) taken once monthly for one year    Evenity (210mg) once a month subcutaneous injection used for one year. Blocks the effects of sclerostin, a protein that prevents bone production and causes bone breakdown. Dual action of bone building and antiresorptive.  Trials did show there was an increased risk of cardiovascular disease (CI in patients with history of MI or stroke).  Increased risk is 50% from baseline and 1.5% relative risk.  Rare, but a possibility.   Most common side effects are joint pain and headaches.  Use it for one year followed by antiresorptive - Prolia or bisphosphonate. Use prolia.     Receive injection in infusion center.     For more information on medications: [email or web address]/[email or web address]    "

## 2025-06-12 NOTE — ASSESSMENT & PLAN NOTE
Reviewed goals of therapy - to get the best control we can without hypoglycemia. Goal <7.5  A1c rising but GMI improving  Back on steroids . Feels he needs more steroids. Increase prednisone to 8 mg twice daily     Has been on Trulicity and Mounjaro in the past. Has had some GI issues. 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 If your blood sugar drops <70 then decrease novolog to 2 units before snacks.   If you are skipping a meal at what would be meal time, give sliding scale only   Try to give novolog no sooner than every 4 hours     -Advised frequent self blood glucose monitoring. Patient encouraged to document glucose results and bring them to every clinic visit    -Hypoglycemia precautions discussed. Instructed on precautions before driving.    -Close adherence to lifestyle changes recommended.    -Periodic follow ups for eye evaluations, foot care suggested.  Not on statin due to allergy - consider PCSK9 in future

## (undated) DEVICE — DRESSING TRANS 4X4 TEGADERM

## (undated) DEVICE — UNDERWEAR ADULT LARGE PULL ON

## (undated) DEVICE — SEE L#120831

## (undated) DEVICE — Device

## (undated) DEVICE — BAG SNAP KOVER BAND 36 X 28 IN

## (undated) DEVICE — DRAPE LAP T SHT W/ INSTR PAD

## (undated) DEVICE — SYR 10CC LUER LOCK

## (undated) DEVICE — GLIDESHEATH SLENDER SS 5FR10CM

## (undated) DEVICE — SOL PVP-I SCRUB 7.5% 4OZ

## (undated) DEVICE — GLOVE SENSICARE PI SURG 7.5

## (undated) DEVICE — UNDERGLOVES BIOGEL PI SIZE 8

## (undated) DEVICE — BOTTLE  EVOLUTION EVAC SUC

## (undated) DEVICE — DEVICE KYPHON BONE BIOPSY 13G

## (undated) DEVICE — ELECTRODE REM PLYHSV RETURN 9

## (undated) DEVICE — SWAB AEROBIC CULTURETTE

## (undated) DEVICE — CHLORAPREP W TINT 26ML APPL

## (undated) DEVICE — GLOVE BIOGEL SKINSENSE PI 7.0

## (undated) DEVICE — CATH OPTITORQUE RADIAL 5FR

## (undated) DEVICE — NDL HYPO REG 25G X 1 1/2

## (undated) DEVICE — DRAPE THYROID WITH ARMBOARD

## (undated) DEVICE — BAG DRAINAGE W/SPIKE

## (undated) DEVICE — PAD ABD 8X10 STERILE

## (undated) DEVICE — CATH BLLN FG APEX MR 3.00X15MM

## (undated) DEVICE — CATH BLLN FG APEX MR 2.00X20MM

## (undated) DEVICE — GLOVE BIOGEL SKINSENSE PI 8.0

## (undated) DEVICE — TRAY DRY SKIN SCRUB PREP

## (undated) DEVICE — KIT CUSTOM INFLATION DEV

## (undated) DEVICE — BRUSH SCRUB HIBICLENS 4%

## (undated) DEVICE — ADHESIVE DERMABOND ADVANCED

## (undated) DEVICE — GAUZE DERMACEA LOW PLY 3X4YRDS

## (undated) DEVICE — SOL POVIDONE SCRUB IODINE 4 OZ

## (undated) DEVICE — SYR CNTRL MALE LL 10ML

## (undated) DEVICE — SYR B-D DISP CONTROL 10CC100/C

## (undated) DEVICE — GOWN ECLIPSE REINF LVL4 TWL LG

## (undated) DEVICE — HEMOSTAT VASC BAND LONG 27CM

## (undated) DEVICE — COVER SNAP 36IN X 30IN

## (undated) DEVICE — J WIRE HI TORQUE PILOT 50X014

## (undated) DEVICE — GUIDE HEARTRAIL IKARI R 6F 1.5

## (undated) DEVICE — CATH BLLN FG APEX MR 2.50X12MM

## (undated) DEVICE — TRAY CORONARY CUSTOM BAPTIST

## (undated) DEVICE — DRAPE INCISE IOBAN 2 23X17IN

## (undated) DEVICE — KIT PROBE COVER WITH GEL

## (undated) DEVICE — SYR ONLY LUER LOCK 20CC

## (undated) DEVICE — SYR 10ML LIDOCAINE

## (undated) DEVICE — MANIFOLD PERCEPTOR MP 3P RH ON

## (undated) DEVICE — CATH BLLN APEX FLEX MR 1.50X20

## (undated) DEVICE — DRAPE C-ARMOR EQUIPMENT COVER

## (undated) DEVICE — OMNIPAQUE 350MG 150ML VIAL

## (undated) DEVICE — WIRE GUIDE HI TRQ BAL

## (undated) DEVICE — ELECTRODE NEEDLE 1IN

## (undated) DEVICE — GAUZE SPONGE 4X4 12PLY

## (undated) DEVICE — KIT GLIDESHEATH SLEND 6FR 10CM

## (undated) DEVICE — GLOVE BIOGEL SKINSENSE PI 7.5

## (undated) DEVICE — DRAPE C-ARM 41X125IN

## (undated) DEVICE — CLIPPER BLADE MOD 4406 (CAREF)

## (undated) DEVICE — CATH CENTESIS ONESTEP 5FRX10CM

## (undated) DEVICE — KIT KYPHOPAK 1ST FX CDS 15/2

## (undated) DEVICE — SOL BETADINE 5%

## (undated) DEVICE — TRAY PARACENTESIS 8FR

## (undated) DEVICE — SUT CHROMIC 3-0 SH 27IN GUT

## (undated) DEVICE — CARTRIDGE KYPHON CEMENT DEL

## (undated) DEVICE — STRIP MEDI WND CLSR 1/2X4IN

## (undated) DEVICE — GUIDEWIRE ANGIO 1.5MM .035X180

## (undated) DEVICE — SPONGE COTTON TRAY 4X4IN